# Patient Record
Sex: FEMALE | Race: BLACK OR AFRICAN AMERICAN | Employment: UNEMPLOYED | URBAN - METROPOLITAN AREA
[De-identification: names, ages, dates, MRNs, and addresses within clinical notes are randomized per-mention and may not be internally consistent; named-entity substitution may affect disease eponyms.]

---

## 2017-10-18 ENCOUNTER — TELEPHONE (OUTPATIENT)
Dept: INTERNAL MEDICINE CLINIC | Age: 42
End: 2017-10-18

## 2018-01-26 ENCOUNTER — APPOINTMENT (OUTPATIENT)
Dept: GENERAL RADIOLOGY | Age: 43
DRG: 005 | End: 2018-01-26
Payer: MEDICARE

## 2018-01-26 ENCOUNTER — HOSPITAL ENCOUNTER (INPATIENT)
Age: 43
LOS: 28 days | Discharge: NURSING, REHAB OR LONG-TERM CARE | DRG: 005 | End: 2018-02-23
Attending: EMERGENCY MEDICINE | Admitting: INTERNAL MEDICINE
Payer: MEDICARE

## 2018-01-26 DIAGNOSIS — Z93.1 STATUS POST INSERTION OF PERCUTANEOUS ENDOSCOPIC GASTROSTOMY (PEG) TUBE (HCC): ICD-10-CM

## 2018-01-26 DIAGNOSIS — A41.9 SEPSIS, DUE TO UNSPECIFIED ORGANISM: ICD-10-CM

## 2018-01-26 DIAGNOSIS — I16.1 HYPERTENSIVE EMERGENCY: ICD-10-CM

## 2018-01-26 DIAGNOSIS — Z93.0 STATUS POST TRACHEOSTOMY (HCC): ICD-10-CM

## 2018-01-26 DIAGNOSIS — J96.01 ACUTE RESPIRATORY FAILURE WITH HYPOXIA AND HYPERCAPNIA (HCC): ICD-10-CM

## 2018-01-26 DIAGNOSIS — J44.1 COPD EXACERBATION (HCC): Primary | ICD-10-CM

## 2018-01-26 DIAGNOSIS — J96.02 ACUTE RESPIRATORY FAILURE WITH HYPOXIA AND HYPERCAPNIA (HCC): ICD-10-CM

## 2018-01-26 DIAGNOSIS — J18.9 PNEUMONIA DUE TO ORGANISM: ICD-10-CM

## 2018-01-26 LAB
ALBUMIN SERPL-MCNC: 4 G/DL (ref 3.5–5.2)
ALBUMIN/GLOBULIN RATIO: 1.1 (ref 1–2.5)
ALP BLD-CCNC: 121 U/L (ref 35–104)
ALT SERPL-CCNC: 15 U/L (ref 5–33)
ANION GAP SERPL CALCULATED.3IONS-SCNC: 20 MMOL/L (ref 9–17)
AST SERPL-CCNC: 23 U/L
BILIRUB SERPL-MCNC: 0.59 MG/DL (ref 0.3–1.2)
BILIRUBIN DIRECT: 0.12 MG/DL
BILIRUBIN, INDIRECT: 0.47 MG/DL (ref 0–1)
BNP INTERPRETATION: NORMAL
BUN BLDV-MCNC: 7 MG/DL (ref 6–20)
BUN/CREAT BLD: ABNORMAL (ref 9–20)
CALCIUM SERPL-MCNC: 9.2 MG/DL (ref 8.6–10.4)
CHLORIDE BLD-SCNC: 95 MMOL/L (ref 98–107)
CO2: 21 MMOL/L (ref 20–31)
CREAT SERPL-MCNC: 0.54 MG/DL (ref 0.5–0.9)
GFR AFRICAN AMERICAN: >60 ML/MIN
GFR NON-AFRICAN AMERICAN: >60 ML/MIN
GFR SERPL CREATININE-BSD FRML MDRD: ABNORMAL ML/MIN/{1.73_M2}
GFR SERPL CREATININE-BSD FRML MDRD: ABNORMAL ML/MIN/{1.73_M2}
GLOBULIN: ABNORMAL G/DL (ref 1.5–3.8)
GLUCOSE BLD-MCNC: 224 MG/DL (ref 70–99)
HCT VFR BLD CALC: 38.4 % (ref 36.3–47.1)
HEMOGLOBIN: 11.1 G/DL (ref 11.9–15.1)
INR BLD: 1
LACTIC ACID, WHOLE BLOOD: 2.1 MMOL/L (ref 0.7–2.1)
LACTIC ACID, WHOLE BLOOD: 5.5 MMOL/L (ref 0.7–2.1)
MCH RBC QN AUTO: 25.5 PG (ref 25.2–33.5)
MCHC RBC AUTO-ENTMCNC: 28.9 G/DL (ref 28.4–34.8)
MCV RBC AUTO: 88.3 FL (ref 82.6–102.9)
NRBC AUTOMATED: 0 PER 100 WBC
PARTIAL THROMBOPLASTIN TIME: 21.8 SEC (ref 21.3–31.3)
PDW BLD-RTO: 15.1 % (ref 11.8–14.4)
PLATELET # BLD: 322 K/UL (ref 138–453)
PMV BLD AUTO: 10.6 FL (ref 8.1–13.5)
POC TROPONIN I: 0 NG/ML (ref 0–0.1)
POC TROPONIN I: 0.21 NG/ML (ref 0–0.1)
POC TROPONIN INTERP: ABNORMAL
POC TROPONIN INTERP: NORMAL
POTASSIUM SERPL-SCNC: 3.6 MMOL/L (ref 3.7–5.3)
PRO-BNP: 214 PG/ML
PROTHROMBIN TIME: 11 SEC (ref 9.4–12.6)
RBC # BLD: 4.35 M/UL (ref 3.95–5.11)
SODIUM BLD-SCNC: 136 MMOL/L (ref 135–144)
TOTAL PROTEIN: 7.6 G/DL (ref 6.4–8.3)
WBC # BLD: 14.8 K/UL (ref 3.5–11.3)

## 2018-01-26 PROCEDURE — 99285 EMERGENCY DEPT VISIT HI MDM: CPT

## 2018-01-26 PROCEDURE — 94640 AIRWAY INHALATION TREATMENT: CPT

## 2018-01-26 PROCEDURE — 6360000002 HC RX W HCPCS: Performed by: EMERGENCY MEDICINE

## 2018-01-26 PROCEDURE — 71045 X-RAY EXAM CHEST 1 VIEW: CPT

## 2018-01-26 PROCEDURE — 80048 BASIC METABOLIC PNL TOTAL CA: CPT

## 2018-01-26 PROCEDURE — 85027 COMPLETE CBC AUTOMATED: CPT

## 2018-01-26 PROCEDURE — 85610 PROTHROMBIN TIME: CPT

## 2018-01-26 PROCEDURE — 87040 BLOOD CULTURE FOR BACTERIA: CPT

## 2018-01-26 PROCEDURE — 84484 ASSAY OF TROPONIN QUANT: CPT

## 2018-01-26 PROCEDURE — 81001 URINALYSIS AUTO W/SCOPE: CPT

## 2018-01-26 PROCEDURE — 83880 ASSAY OF NATRIURETIC PEPTIDE: CPT

## 2018-01-26 PROCEDURE — 80076 HEPATIC FUNCTION PANEL: CPT

## 2018-01-26 PROCEDURE — 96365 THER/PROPH/DIAG IV INF INIT: CPT

## 2018-01-26 PROCEDURE — 85730 THROMBOPLASTIN TIME PARTIAL: CPT

## 2018-01-26 PROCEDURE — 2500000003 HC RX 250 WO HCPCS: Performed by: EMERGENCY MEDICINE

## 2018-01-26 PROCEDURE — 83605 ASSAY OF LACTIC ACID: CPT

## 2018-01-26 PROCEDURE — 94664 DEMO&/EVAL PT USE INHALER: CPT

## 2018-01-26 PROCEDURE — 2000000000 HC ICU R&B

## 2018-01-26 PROCEDURE — 51702 INSERT TEMP BLADDER CATH: CPT

## 2018-01-26 PROCEDURE — 93005 ELECTROCARDIOGRAM TRACING: CPT

## 2018-01-26 PROCEDURE — 96375 TX/PRO/DX INJ NEW DRUG ADDON: CPT

## 2018-01-26 PROCEDURE — 94660 CPAP INITIATION&MGMT: CPT

## 2018-01-26 RX ORDER — LEVOFLOXACIN 5 MG/ML
750 INJECTION, SOLUTION INTRAVENOUS ONCE
Status: COMPLETED | OUTPATIENT
Start: 2018-01-26 | End: 2018-01-27

## 2018-01-26 RX ORDER — ALBUTEROL SULFATE 2.5 MG/3ML
5 SOLUTION RESPIRATORY (INHALATION)
Status: DISCONTINUED | OUTPATIENT
Start: 2018-01-26 | End: 2018-01-27

## 2018-01-26 RX ORDER — OXYCODONE AND ACETAMINOPHEN 10; 325 MG/1; MG/1
1 TABLET ORAL EVERY 6 HOURS PRN
Status: ON HOLD | COMMUNITY
End: 2018-02-21 | Stop reason: HOSPADM

## 2018-01-26 RX ORDER — LEVOFLOXACIN 5 MG/ML
500 INJECTION, SOLUTION INTRAVENOUS EVERY 24 HOURS
Status: CANCELLED | OUTPATIENT
Start: 2018-01-26

## 2018-01-26 RX ORDER — LORAZEPAM 2 MG/ML
0.5 INJECTION INTRAMUSCULAR ONCE
Status: COMPLETED | OUTPATIENT
Start: 2018-01-26 | End: 2018-01-26

## 2018-01-26 RX ORDER — ALBUTEROL SULFATE 90 UG/1
2 AEROSOL, METERED RESPIRATORY (INHALATION) EVERY 6 HOURS PRN
COMMUNITY

## 2018-01-26 RX ORDER — LORATADINE 10 MG/1
10 CAPSULE, LIQUID FILLED ORAL DAILY
COMMUNITY
End: 2019-10-28 | Stop reason: SDUPTHER

## 2018-01-26 RX ORDER — AMLODIPINE BESYLATE 10 MG/1
10 TABLET ORAL DAILY
Status: ON HOLD | COMMUNITY
End: 2018-11-21 | Stop reason: HOSPADM

## 2018-01-26 RX ORDER — AZITHROMYCIN 1 G
1 PACKET (EA) ORAL ONCE
Status: ON HOLD | COMMUNITY
End: 2018-02-21 | Stop reason: HOSPADM

## 2018-01-26 RX ORDER — FERROUS SULFATE 325(65) MG
325 TABLET ORAL 2 TIMES DAILY WITH MEALS
COMMUNITY
End: 2019-10-28 | Stop reason: SDUPTHER

## 2018-01-26 RX ORDER — SODIUM CHLORIDE 0.9 % (FLUSH) 0.9 %
10 SYRINGE (ML) INJECTION EVERY 12 HOURS SCHEDULED
Status: CANCELLED | OUTPATIENT
Start: 2018-01-26

## 2018-01-26 RX ORDER — ACETAMINOPHEN 325 MG/1
650 TABLET ORAL EVERY 4 HOURS PRN
Status: CANCELLED | OUTPATIENT
Start: 2018-01-26

## 2018-01-26 RX ORDER — SODIUM CHLORIDE 0.9 % (FLUSH) 0.9 %
10 SYRINGE (ML) INJECTION PRN
Status: CANCELLED | OUTPATIENT
Start: 2018-01-26

## 2018-01-26 RX ORDER — NITROGLYCERIN 20 MG/100ML
5 INJECTION INTRAVENOUS CONTINUOUS
Status: DISCONTINUED | OUTPATIENT
Start: 2018-01-26 | End: 2018-01-27

## 2018-01-26 RX ORDER — PREDNISONE 20 MG/1
40 TABLET ORAL DAILY
Status: CANCELLED | OUTPATIENT
Start: 2018-01-27

## 2018-01-26 RX ORDER — LISINOPRIL 20 MG/1
20 TABLET ORAL DAILY
Status: ON HOLD | COMMUNITY
End: 2018-09-21 | Stop reason: HOSPADM

## 2018-01-26 RX ORDER — FLUTICASONE PROPIONATE 50 MCG
1 SPRAY, SUSPENSION (ML) NASAL DAILY
COMMUNITY

## 2018-01-26 RX ORDER — NICOTINE 21 MG/24HR
1 PATCH, TRANSDERMAL 24 HOURS TRANSDERMAL EVERY 24 HOURS
Status: ON HOLD | COMMUNITY
End: 2018-02-21 | Stop reason: HOSPADM

## 2018-01-26 RX ORDER — FUROSEMIDE 20 MG/1
20 TABLET ORAL DAILY
Status: ON HOLD | COMMUNITY
End: 2018-09-20 | Stop reason: HOSPADM

## 2018-01-26 RX ORDER — HYDROCHLOROTHIAZIDE 12.5 MG/1
12.5 CAPSULE, GELATIN COATED ORAL EVERY MORNING
Status: ON HOLD | COMMUNITY
End: 2018-02-21 | Stop reason: HOSPADM

## 2018-01-26 RX ADMIN — LEVOFLOXACIN 750 MG: 5 INJECTION, SOLUTION INTRAVENOUS at 23:09

## 2018-01-26 RX ADMIN — LORAZEPAM 0.5 MG: 2 INJECTION INTRAMUSCULAR at 21:17

## 2018-01-26 RX ADMIN — IPRATROPIUM BROMIDE 0.5 MG: 0.5 SOLUTION RESPIRATORY (INHALATION) at 22:47

## 2018-01-26 RX ADMIN — ALBUTEROL SULFATE 5 MG: 5 SOLUTION RESPIRATORY (INHALATION) at 22:47

## 2018-01-26 RX ADMIN — NITROGLYCERIN 5 MCG/MIN: 20 INJECTION INTRAVENOUS at 20:34

## 2018-01-26 ASSESSMENT — ENCOUNTER SYMPTOMS
BACK PAIN: 0
NAUSEA: 0
ABDOMINAL PAIN: 0
SHORTNESS OF BREATH: 1
COUGH: 1
VOMITING: 0

## 2018-01-27 ENCOUNTER — APPOINTMENT (OUTPATIENT)
Dept: GENERAL RADIOLOGY | Age: 43
DRG: 005 | End: 2018-01-27
Payer: MEDICARE

## 2018-01-27 ENCOUNTER — APPOINTMENT (OUTPATIENT)
Dept: CT IMAGING | Age: 43
DRG: 005 | End: 2018-01-27
Payer: MEDICARE

## 2018-01-27 PROBLEM — R77.8 ELEVATED TROPONIN: Status: ACTIVE | Noted: 2018-01-27

## 2018-01-27 PROBLEM — J81.1 PULMONARY EDEMA: Status: ACTIVE | Noted: 2018-01-27

## 2018-01-27 PROBLEM — E66.01 MORBID OBESITY (HCC): Status: ACTIVE | Noted: 2018-01-27

## 2018-01-27 PROBLEM — E66.2 OBESITY HYPOVENTILATION SYNDROME (HCC): Status: ACTIVE | Noted: 2018-01-27

## 2018-01-27 PROBLEM — J96.00 ACUTE RESPIRATORY FAILURE (HCC): Status: ACTIVE | Noted: 2018-01-27

## 2018-01-27 PROBLEM — I21.4 NSTEMI (NON-ST ELEVATED MYOCARDIAL INFARCTION) (HCC): Status: ACTIVE | Noted: 2018-01-27

## 2018-01-27 PROBLEM — R79.89 ELEVATED TROPONIN: Status: ACTIVE | Noted: 2018-01-27

## 2018-01-27 PROBLEM — I16.1 HYPERTENSIVE EMERGENCY: Status: ACTIVE | Noted: 2018-01-27

## 2018-01-27 PROBLEM — F17.200 SMOKER: Status: ACTIVE | Noted: 2018-01-27

## 2018-01-27 PROBLEM — R06.02 SOB (SHORTNESS OF BREATH): Status: ACTIVE | Noted: 2018-01-27

## 2018-01-27 LAB
-: ABNORMAL
ALLEN TEST: POSITIVE
AMORPHOUS: ABNORMAL
BACTERIA: ABNORMAL
BILIRUBIN URINE: NEGATIVE
CASTS UA: ABNORMAL /LPF (ref 0–2)
COLOR: YELLOW
COMMENT UA: ABNORMAL
CRYSTALS, UA: ABNORMAL /HPF
DIRECT EXAM: NORMAL
EPITHELIAL CELLS UA: ABNORMAL /HPF (ref 0–5)
FIO2: 70
GLUCOSE URINE: NEGATIVE
KETONES, URINE: ABNORMAL
LEUKOCYTE ESTERASE, URINE: NEGATIVE
LV EF: 65 %
LVEF MODALITY: NORMAL
Lab: NORMAL
MODE: ABNORMAL
MRSA, DNA, NASAL: NORMAL
MUCUS: ABNORMAL
NEGATIVE BASE EXCESS, ART: ABNORMAL (ref 0–2)
NITRITE, URINE: NEGATIVE
O2 DEVICE/FLOW/%: ABNORMAL
OTHER OBSERVATIONS UA: ABNORMAL
PARTIAL THROMBOPLASTIN TIME: 20.3 SEC (ref 21.3–31.3)
PARTIAL THROMBOPLASTIN TIME: 23.6 SEC (ref 21.3–31.3)
PARTIAL THROMBOPLASTIN TIME: 29.4 SEC (ref 21.3–31.3)
PARTIAL THROMBOPLASTIN TIME: 30.1 SEC (ref 21.3–31.3)
PARTIAL THROMBOPLASTIN TIME: 32 SEC (ref 21.3–31.3)
PATIENT TEMP: ABNORMAL
PH UA: 6.5 (ref 5–8)
POC HCO3: 28.3 MMOL/L (ref 21–28)
POC O2 SATURATION: 94 % (ref 94–98)
POC PCO2 TEMP: ABNORMAL MM HG
POC PCO2: 47.2 MM HG (ref 35–48)
POC PH TEMP: ABNORMAL
POC PH: 7.39 (ref 7.35–7.45)
POC PO2 TEMP: ABNORMAL MM HG
POC PO2: 71.7 MM HG (ref 83–108)
POSITIVE BASE EXCESS, ART: 3 (ref 0–3)
PROTEIN UA: ABNORMAL
RBC UA: ABNORMAL /HPF (ref 0–2)
RENAL EPITHELIAL, UA: ABNORMAL /HPF
SAMPLE SITE: ABNORMAL
SPECIFIC GRAVITY UA: 1.01 (ref 1–1.03)
SPECIMEN DESCRIPTION: NORMAL
SPECIMEN DESCRIPTION: NORMAL
STATUS: NORMAL
TCO2 (CALC), ART: 30 MMOL/L (ref 22–29)
TRICHOMONAS: ABNORMAL
TROPONIN INTERP: ABNORMAL
TROPONIN INTERP: ABNORMAL
TROPONIN INTERP: NORMAL
TROPONIN T: 0.05 NG/ML
TROPONIN T: 0.08 NG/ML
TROPONIN T: <0.03 NG/ML
TSH SERPL DL<=0.05 MIU/L-ACNC: 1.23 MIU/L (ref 0.3–5)
TURBIDITY: CLEAR
URINE HGB: NEGATIVE
UROBILINOGEN, URINE: NORMAL
WBC UA: ABNORMAL /HPF (ref 0–5)
YEAST: ABNORMAL

## 2018-01-27 PROCEDURE — 94640 AIRWAY INHALATION TREATMENT: CPT

## 2018-01-27 PROCEDURE — 6360000002 HC RX W HCPCS: Performed by: INTERNAL MEDICINE

## 2018-01-27 PROCEDURE — 6370000000 HC RX 637 (ALT 250 FOR IP): Performed by: HOSPITALIST

## 2018-01-27 PROCEDURE — 6360000002 HC RX W HCPCS: Performed by: EMERGENCY MEDICINE

## 2018-01-27 PROCEDURE — 83036 HEMOGLOBIN GLYCOSYLATED A1C: CPT

## 2018-01-27 PROCEDURE — 2580000003 HC RX 258: Performed by: STUDENT IN AN ORGANIZED HEALTH CARE EDUCATION/TRAINING PROGRAM

## 2018-01-27 PROCEDURE — 82803 BLOOD GASES ANY COMBINATION: CPT

## 2018-01-27 PROCEDURE — 85730 THROMBOPLASTIN TIME PARTIAL: CPT

## 2018-01-27 PROCEDURE — 93306 TTE W/DOPPLER COMPLETE: CPT

## 2018-01-27 PROCEDURE — 6370000000 HC RX 637 (ALT 250 FOR IP): Performed by: EMERGENCY MEDICINE

## 2018-01-27 PROCEDURE — 87801 DETECT AGNT MULT DNA AMPLI: CPT

## 2018-01-27 PROCEDURE — 2500000003 HC RX 250 WO HCPCS: Performed by: EMERGENCY MEDICINE

## 2018-01-27 PROCEDURE — 87205 SMEAR GRAM STAIN: CPT

## 2018-01-27 PROCEDURE — 96366 THER/PROPH/DIAG IV INF ADDON: CPT

## 2018-01-27 PROCEDURE — 94660 CPAP INITIATION&MGMT: CPT

## 2018-01-27 PROCEDURE — 87449 NOS EACH ORGANISM AG IA: CPT

## 2018-01-27 PROCEDURE — 87070 CULTURE OTHR SPECIMN AEROBIC: CPT

## 2018-01-27 PROCEDURE — 87899 AGENT NOS ASSAY W/OPTIC: CPT

## 2018-01-27 PROCEDURE — 6370000000 HC RX 637 (ALT 250 FOR IP): Performed by: STUDENT IN AN ORGANIZED HEALTH CARE EDUCATION/TRAINING PROGRAM

## 2018-01-27 PROCEDURE — 2500000003 HC RX 250 WO HCPCS: Performed by: STUDENT IN AN ORGANIZED HEALTH CARE EDUCATION/TRAINING PROGRAM

## 2018-01-27 PROCEDURE — 6360000002 HC RX W HCPCS

## 2018-01-27 PROCEDURE — 6360000002 HC RX W HCPCS: Performed by: STUDENT IN AN ORGANIZED HEALTH CARE EDUCATION/TRAINING PROGRAM

## 2018-01-27 PROCEDURE — 93005 ELECTROCARDIOGRAM TRACING: CPT

## 2018-01-27 PROCEDURE — 87641 MR-STAPH DNA AMP PROBE: CPT

## 2018-01-27 PROCEDURE — 96376 TX/PRO/DX INJ SAME DRUG ADON: CPT

## 2018-01-27 PROCEDURE — 84484 ASSAY OF TROPONIN QUANT: CPT

## 2018-01-27 PROCEDURE — 36600 WITHDRAWAL OF ARTERIAL BLOOD: CPT

## 2018-01-27 PROCEDURE — 84443 ASSAY THYROID STIM HORMONE: CPT

## 2018-01-27 PROCEDURE — 87804 INFLUENZA ASSAY W/OPTIC: CPT

## 2018-01-27 PROCEDURE — 80307 DRUG TEST PRSMV CHEM ANLYZR: CPT

## 2018-01-27 PROCEDURE — 71045 X-RAY EXAM CHEST 1 VIEW: CPT

## 2018-01-27 PROCEDURE — 87086 URINE CULTURE/COLONY COUNT: CPT

## 2018-01-27 PROCEDURE — 36415 COLL VENOUS BLD VENIPUNCTURE: CPT

## 2018-01-27 PROCEDURE — 96375 TX/PRO/DX INJ NEW DRUG ADDON: CPT

## 2018-01-27 PROCEDURE — 2000000000 HC ICU R&B

## 2018-01-27 PROCEDURE — 6360000002 HC RX W HCPCS: Performed by: HOSPITALIST

## 2018-01-27 PROCEDURE — 83735 ASSAY OF MAGNESIUM: CPT

## 2018-01-27 PROCEDURE — 99291 CRITICAL CARE FIRST HOUR: CPT | Performed by: INTERNAL MEDICINE

## 2018-01-27 RX ORDER — AMLODIPINE BESYLATE 10 MG/1
10 TABLET ORAL DAILY
Status: DISCONTINUED | OUTPATIENT
Start: 2018-01-27 | End: 2018-01-27

## 2018-01-27 RX ORDER — HEPARIN SODIUM 10000 [USP'U]/100ML
1000 INJECTION, SOLUTION INTRAVENOUS CONTINUOUS
Status: DISCONTINUED | OUTPATIENT
Start: 2018-01-27 | End: 2018-01-28

## 2018-01-27 RX ORDER — HEPARIN SODIUM 10000 [USP'U]/100ML
12 INJECTION, SOLUTION INTRAVENOUS CONTINUOUS
Status: DISCONTINUED | OUTPATIENT
Start: 2018-01-27 | End: 2018-01-27

## 2018-01-27 RX ORDER — FUROSEMIDE 10 MG/ML
20 INJECTION INTRAMUSCULAR; INTRAVENOUS ONCE
Status: COMPLETED | OUTPATIENT
Start: 2018-01-27 | End: 2018-01-27

## 2018-01-27 RX ORDER — METOPROLOL TARTRATE 50 MG/1
25 TABLET, FILM COATED ORAL 2 TIMES DAILY
Status: DISCONTINUED | OUTPATIENT
Start: 2018-01-27 | End: 2018-01-27

## 2018-01-27 RX ORDER — LABETALOL HYDROCHLORIDE 5 MG/ML
10 INJECTION, SOLUTION INTRAVENOUS ONCE
Status: DISCONTINUED | OUTPATIENT
Start: 2018-01-27 | End: 2018-01-27

## 2018-01-27 RX ORDER — LISINOPRIL 10 MG/1
10 TABLET ORAL DAILY
Status: DISCONTINUED | OUTPATIENT
Start: 2018-01-28 | End: 2018-01-28

## 2018-01-27 RX ORDER — FENTANYL CITRATE 50 UG/ML
INJECTION, SOLUTION INTRAMUSCULAR; INTRAVENOUS
Status: COMPLETED
Start: 2018-01-27 | End: 2018-01-27

## 2018-01-27 RX ORDER — AMLODIPINE BESYLATE 10 MG/1
10 TABLET ORAL DAILY
Status: DISCONTINUED | OUTPATIENT
Start: 2018-01-28 | End: 2018-01-29

## 2018-01-27 RX ORDER — ATORVASTATIN CALCIUM 40 MG/1
40 TABLET, FILM COATED ORAL NIGHTLY
Status: DISCONTINUED | OUTPATIENT
Start: 2018-01-27 | End: 2018-02-21

## 2018-01-27 RX ORDER — FENTANYL CITRATE 50 UG/ML
100 INJECTION, SOLUTION INTRAMUSCULAR; INTRAVENOUS ONCE
Status: COMPLETED | OUTPATIENT
Start: 2018-01-27 | End: 2018-01-27

## 2018-01-27 RX ORDER — LABETALOL HYDROCHLORIDE 5 MG/ML
20 INJECTION, SOLUTION INTRAVENOUS ONCE
Status: COMPLETED | OUTPATIENT
Start: 2018-01-27 | End: 2018-01-27

## 2018-01-27 RX ORDER — DOXYCYCLINE HYCLATE 100 MG
100 TABLET ORAL 2 TIMES DAILY
Status: ON HOLD | COMMUNITY
End: 2018-09-20 | Stop reason: HOSPADM

## 2018-01-27 RX ORDER — POTASSIUM CHLORIDE 20 MEQ/1
40 TABLET, EXTENDED RELEASE ORAL PRN
Status: DISCONTINUED | OUTPATIENT
Start: 2018-01-27 | End: 2018-02-23 | Stop reason: HOSPADM

## 2018-01-27 RX ORDER — METHYLPREDNISOLONE SODIUM SUCCINATE 125 MG/2ML
40 INJECTION, POWDER, LYOPHILIZED, FOR SOLUTION INTRAMUSCULAR; INTRAVENOUS EVERY 8 HOURS
Status: DISCONTINUED | OUTPATIENT
Start: 2018-01-27 | End: 2018-01-27

## 2018-01-27 RX ORDER — LORAZEPAM 2 MG/ML
1 INJECTION INTRAMUSCULAR ONCE
Status: COMPLETED | OUTPATIENT
Start: 2018-01-27 | End: 2018-01-27

## 2018-01-27 RX ORDER — FENTANYL CITRATE 50 UG/ML
50 INJECTION, SOLUTION INTRAMUSCULAR; INTRAVENOUS
Status: DISCONTINUED | OUTPATIENT
Start: 2018-01-27 | End: 2018-01-28

## 2018-01-27 RX ORDER — IPRATROPIUM BROMIDE AND ALBUTEROL SULFATE 2.5; .5 MG/3ML; MG/3ML
1 SOLUTION RESPIRATORY (INHALATION) 4 TIMES DAILY
Status: DISCONTINUED | OUTPATIENT
Start: 2018-01-27 | End: 2018-02-03

## 2018-01-27 RX ORDER — FENTANYL CITRATE 50 UG/ML
50 INJECTION, SOLUTION INTRAMUSCULAR; INTRAVENOUS ONCE
Status: COMPLETED | OUTPATIENT
Start: 2018-01-27 | End: 2018-01-27

## 2018-01-27 RX ORDER — SODIUM CHLORIDE 0.9 % (FLUSH) 0.9 %
10 SYRINGE (ML) INJECTION EVERY 12 HOURS SCHEDULED
Status: DISCONTINUED | OUTPATIENT
Start: 2018-01-27 | End: 2018-02-18

## 2018-01-27 RX ORDER — SODIUM CHLORIDE 0.9 % (FLUSH) 0.9 %
10 SYRINGE (ML) INJECTION PRN
Status: DISCONTINUED | OUTPATIENT
Start: 2018-01-27 | End: 2018-02-23 | Stop reason: HOSPADM

## 2018-01-27 RX ORDER — HYDROCHLOROTHIAZIDE 12.5 MG/1
12.5 CAPSULE, GELATIN COATED ORAL EVERY MORNING
Status: DISCONTINUED | OUTPATIENT
Start: 2018-01-28 | End: 2018-01-29

## 2018-01-27 RX ORDER — LEVOFLOXACIN 5 MG/ML
750 INJECTION, SOLUTION INTRAVENOUS EVERY 24 HOURS
Status: DISCONTINUED | OUTPATIENT
Start: 2018-01-27 | End: 2018-01-27

## 2018-01-27 RX ORDER — LABETALOL HYDROCHLORIDE 5 MG/ML
10 INJECTION, SOLUTION INTRAVENOUS ONCE
Status: COMPLETED | OUTPATIENT
Start: 2018-01-27 | End: 2018-01-27

## 2018-01-27 RX ORDER — NICOTINE 21 MG/24HR
1 PATCH, TRANSDERMAL 24 HOURS TRANSDERMAL DAILY
Status: DISCONTINUED | OUTPATIENT
Start: 2018-01-27 | End: 2018-02-01

## 2018-01-27 RX ORDER — ASPIRIN 81 MG/1
81 TABLET, CHEWABLE ORAL DAILY
Status: DISCONTINUED | OUTPATIENT
Start: 2018-01-27 | End: 2018-01-27

## 2018-01-27 RX ORDER — POTASSIUM CHLORIDE 20MEQ/15ML
40 LIQUID (ML) ORAL PRN
Status: DISCONTINUED | OUTPATIENT
Start: 2018-01-27 | End: 2018-02-23 | Stop reason: HOSPADM

## 2018-01-27 RX ORDER — IPRATROPIUM BROMIDE AND ALBUTEROL SULFATE 2.5; .5 MG/3ML; MG/3ML
1 SOLUTION RESPIRATORY (INHALATION)
Status: DISCONTINUED | OUTPATIENT
Start: 2018-01-27 | End: 2018-01-27

## 2018-01-27 RX ORDER — FUROSEMIDE 10 MG/ML
40 INJECTION INTRAMUSCULAR; INTRAVENOUS 2 TIMES DAILY
Status: DISCONTINUED | OUTPATIENT
Start: 2018-01-27 | End: 2018-01-27

## 2018-01-27 RX ORDER — METHYLPREDNISOLONE SODIUM SUCCINATE 125 MG/2ML
40 INJECTION, POWDER, LYOPHILIZED, FOR SOLUTION INTRAMUSCULAR; INTRAVENOUS EVERY 6 HOURS
Status: DISCONTINUED | OUTPATIENT
Start: 2018-01-27 | End: 2018-01-29

## 2018-01-27 RX ORDER — PREDNISONE 20 MG/1
20 TABLET ORAL DAILY
Status: ON HOLD | COMMUNITY
End: 2018-02-21 | Stop reason: HOSPADM

## 2018-01-27 RX ORDER — ONDANSETRON 2 MG/ML
4 INJECTION INTRAMUSCULAR; INTRAVENOUS EVERY 6 HOURS PRN
Status: DISCONTINUED | OUTPATIENT
Start: 2018-01-27 | End: 2018-02-23 | Stop reason: HOSPADM

## 2018-01-27 RX ORDER — ACETAMINOPHEN 325 MG/1
650 TABLET ORAL EVERY 4 HOURS PRN
Status: DISCONTINUED | OUTPATIENT
Start: 2018-01-27 | End: 2018-01-27

## 2018-01-27 RX ORDER — HEPARIN SODIUM 1000 [USP'U]/ML
4000 INJECTION, SOLUTION INTRAVENOUS; SUBCUTANEOUS PRN
Status: DISCONTINUED | OUTPATIENT
Start: 2018-01-27 | End: 2018-01-27

## 2018-01-27 RX ORDER — METOPROLOL TARTRATE 50 MG/1
25 TABLET, FILM COATED ORAL ONCE
Status: COMPLETED | OUTPATIENT
Start: 2018-01-27 | End: 2018-01-27

## 2018-01-27 RX ORDER — HEPARIN SODIUM 1000 [USP'U]/ML
4000 INJECTION, SOLUTION INTRAVENOUS; SUBCUTANEOUS PRN
Status: DISCONTINUED | OUTPATIENT
Start: 2018-01-27 | End: 2018-01-29

## 2018-01-27 RX ORDER — OSELTAMIVIR PHOSPHATE 75 MG/1
75 CAPSULE ORAL 2 TIMES DAILY
Status: DISCONTINUED | OUTPATIENT
Start: 2018-01-27 | End: 2018-01-30

## 2018-01-27 RX ORDER — NICOTINE 21 MG/24HR
1 PATCH, TRANSDERMAL 24 HOURS TRANSDERMAL EVERY 24 HOURS
Status: ON HOLD | COMMUNITY
End: 2018-09-20 | Stop reason: HOSPADM

## 2018-01-27 RX ORDER — HEPARIN SODIUM 10000 [USP'U]/100ML
1000 INJECTION, SOLUTION INTRAVENOUS CONTINUOUS
Status: DISCONTINUED | OUTPATIENT
Start: 2018-01-27 | End: 2018-01-27

## 2018-01-27 RX ORDER — LISINOPRIL 10 MG/1
10 TABLET ORAL DAILY
Status: DISCONTINUED | OUTPATIENT
Start: 2018-01-27 | End: 2018-01-27

## 2018-01-27 RX ORDER — SODIUM CHLORIDE 0.9 % (FLUSH) 0.9 %
10 SYRINGE (ML) INJECTION EVERY 12 HOURS SCHEDULED
Status: DISCONTINUED | OUTPATIENT
Start: 2018-01-27 | End: 2018-01-27

## 2018-01-27 RX ORDER — HEPARIN SODIUM 1000 [USP'U]/ML
2000 INJECTION, SOLUTION INTRAVENOUS; SUBCUTANEOUS PRN
Status: DISCONTINUED | OUTPATIENT
Start: 2018-01-27 | End: 2018-01-27

## 2018-01-27 RX ORDER — ATORVASTATIN CALCIUM 80 MG/1
40 TABLET, FILM COATED ORAL NIGHTLY
Status: DISCONTINUED | OUTPATIENT
Start: 2018-01-27 | End: 2018-01-27

## 2018-01-27 RX ORDER — LISINOPRIL 10 MG/1
20 TABLET ORAL DAILY
Status: DISCONTINUED | OUTPATIENT
Start: 2018-01-27 | End: 2018-01-27

## 2018-01-27 RX ORDER — ALBUTEROL SULFATE 2.5 MG/3ML
2.5 SOLUTION RESPIRATORY (INHALATION) 2 TIMES DAILY
Status: DISCONTINUED | OUTPATIENT
Start: 2018-01-28 | End: 2018-02-03

## 2018-01-27 RX ORDER — HEPARIN SODIUM 1000 [USP'U]/ML
30 INJECTION, SOLUTION INTRAVENOUS; SUBCUTANEOUS PRN
Status: DISCONTINUED | OUTPATIENT
Start: 2018-01-27 | End: 2018-01-27

## 2018-01-27 RX ORDER — LABETALOL HYDROCHLORIDE 5 MG/ML
10 INJECTION, SOLUTION INTRAVENOUS EVERY 4 HOURS PRN
Status: DISCONTINUED | OUTPATIENT
Start: 2018-01-27 | End: 2018-01-27

## 2018-01-27 RX ORDER — HEPARIN SODIUM 1000 [USP'U]/ML
2000 INJECTION, SOLUTION INTRAVENOUS; SUBCUTANEOUS PRN
Status: DISCONTINUED | OUTPATIENT
Start: 2018-01-27 | End: 2018-01-29

## 2018-01-27 RX ORDER — HEPARIN SODIUM 1000 [USP'U]/ML
60 INJECTION, SOLUTION INTRAVENOUS; SUBCUTANEOUS PRN
Status: DISCONTINUED | OUTPATIENT
Start: 2018-01-27 | End: 2018-01-27

## 2018-01-27 RX ORDER — ALBUTEROL SULFATE 2.5 MG/3ML
2.5 SOLUTION RESPIRATORY (INHALATION)
Status: DISCONTINUED | OUTPATIENT
Start: 2018-01-27 | End: 2018-01-29

## 2018-01-27 RX ORDER — ATORVASTATIN CALCIUM 10 MG/1
10 TABLET, FILM COATED ORAL ONCE
Status: COMPLETED | OUTPATIENT
Start: 2018-01-27 | End: 2018-01-27

## 2018-01-27 RX ORDER — SODIUM CHLORIDE 0.9 % (FLUSH) 0.9 %
10 SYRINGE (ML) INJECTION PRN
Status: DISCONTINUED | OUTPATIENT
Start: 2018-01-27 | End: 2018-01-27

## 2018-01-27 RX ORDER — HEPARIN SODIUM 1000 [USP'U]/ML
4000 INJECTION, SOLUTION INTRAVENOUS; SUBCUTANEOUS ONCE
Status: COMPLETED | OUTPATIENT
Start: 2018-01-27 | End: 2018-01-27

## 2018-01-27 RX ORDER — AZITHROMYCIN 250 MG/1
250 TABLET, FILM COATED ORAL DAILY
Status: ON HOLD | COMMUNITY
End: 2018-02-21 | Stop reason: HOSPADM

## 2018-01-27 RX ORDER — METHYLPREDNISOLONE SODIUM SUCCINATE 125 MG/2ML
125 INJECTION, POWDER, LYOPHILIZED, FOR SOLUTION INTRAMUSCULAR; INTRAVENOUS ONCE
Status: COMPLETED | OUTPATIENT
Start: 2018-01-27 | End: 2018-01-27

## 2018-01-27 RX ORDER — POTASSIUM CHLORIDE 7.45 MG/ML
10 INJECTION INTRAVENOUS PRN
Status: DISCONTINUED | OUTPATIENT
Start: 2018-01-27 | End: 2018-02-23 | Stop reason: HOSPADM

## 2018-01-27 RX ORDER — ACETAMINOPHEN 325 MG/1
650 TABLET ORAL EVERY 4 HOURS PRN
Status: DISCONTINUED | OUTPATIENT
Start: 2018-01-27 | End: 2018-02-23 | Stop reason: HOSPADM

## 2018-01-27 RX ORDER — CLOPIDOGREL 300 MG/1
300 TABLET, FILM COATED ORAL ONCE
Status: COMPLETED | OUTPATIENT
Start: 2018-01-27 | End: 2018-01-27

## 2018-01-27 RX ORDER — ALBUTEROL SULFATE 2.5 MG/3ML
2.5 SOLUTION RESPIRATORY (INHALATION)
Status: DISCONTINUED | OUTPATIENT
Start: 2018-01-27 | End: 2018-01-27

## 2018-01-27 RX ORDER — ONDANSETRON 2 MG/ML
4 INJECTION INTRAMUSCULAR; INTRAVENOUS EVERY 6 HOURS PRN
Status: DISCONTINUED | OUTPATIENT
Start: 2018-01-27 | End: 2018-01-27

## 2018-01-27 RX ADMIN — NICARDIPINE HYDROCHLORIDE 10 MG/HR: 0.1 INJECTION, SOLUTION INTRAVENOUS at 20:33

## 2018-01-27 RX ADMIN — IPRATROPIUM BROMIDE AND ALBUTEROL SULFATE 1 AMPULE: .5; 3 SOLUTION RESPIRATORY (INHALATION) at 19:31

## 2018-01-27 RX ADMIN — LORAZEPAM 1 MG: 2 INJECTION INTRAMUSCULAR; INTRAVENOUS at 12:03

## 2018-01-27 RX ADMIN — IPRATROPIUM BROMIDE AND ALBUTEROL SULFATE 1 AMPULE: .5; 3 SOLUTION RESPIRATORY (INHALATION) at 16:18

## 2018-01-27 RX ADMIN — METHYLPREDNISOLONE SODIUM SUCCINATE 40 MG: 125 INJECTION, POWDER, FOR SOLUTION INTRAMUSCULAR; INTRAVENOUS at 16:15

## 2018-01-27 RX ADMIN — LORAZEPAM 1 MG: 2 INJECTION INTRAMUSCULAR; INTRAVENOUS at 04:02

## 2018-01-27 RX ADMIN — METOPROLOL TARTRATE 25 MG: 50 TABLET, FILM COATED ORAL at 02:04

## 2018-01-27 RX ADMIN — FENTANYL CITRATE 100 MCG: 50 INJECTION, SOLUTION INTRAMUSCULAR; INTRAVENOUS at 15:57

## 2018-01-27 RX ADMIN — METHYLPREDNISOLONE SODIUM SUCCINATE 40 MG: 125 INJECTION, POWDER, FOR SOLUTION INTRAMUSCULAR; INTRAVENOUS at 20:33

## 2018-01-27 RX ADMIN — CLOPIDOGREL BISULFATE 300 MG: 300 TABLET, FILM COATED ORAL at 01:09

## 2018-01-27 RX ADMIN — WATER 1 G: 1 INJECTION INTRAMUSCULAR; INTRAVENOUS; SUBCUTANEOUS at 17:33

## 2018-01-27 RX ADMIN — HEPARIN SODIUM AND DEXTROSE 1000 UNITS/HR: 10000; 5 INJECTION INTRAVENOUS at 01:12

## 2018-01-27 RX ADMIN — FUROSEMIDE 20 MG: 10 INJECTION, SOLUTION INTRAVENOUS at 09:06

## 2018-01-27 RX ADMIN — METHYLPREDNISOLONE SODIUM SUCCINATE 125 MG: 125 INJECTION, POWDER, FOR SOLUTION INTRAMUSCULAR; INTRAVENOUS at 04:02

## 2018-01-27 RX ADMIN — LORAZEPAM 1 MG: 2 INJECTION INTRAMUSCULAR; INTRAVENOUS at 08:16

## 2018-01-27 RX ADMIN — NITROGLYCERIN 120 MCG/MIN: 20 INJECTION INTRAVENOUS at 05:27

## 2018-01-27 RX ADMIN — ALBUTEROL SULFATE 5 MG: 5 SOLUTION RESPIRATORY (INHALATION) at 07:17

## 2018-01-27 RX ADMIN — LABETALOL HYDROCHLORIDE 20 MG: 5 INJECTION, SOLUTION INTRAVENOUS at 06:37

## 2018-01-27 RX ADMIN — HEPARIN SODIUM AND DEXTROSE 10.48 UNITS/KG/HR: 10000; 5 INJECTION INTRAVENOUS at 23:24

## 2018-01-27 RX ADMIN — NITROGLYCERIN 150 MCG/MIN: 20 INJECTION INTRAVENOUS at 06:05

## 2018-01-27 RX ADMIN — LIDOCAINE HYDROCHLORIDE: 20 JELLY TOPICAL at 09:06

## 2018-01-27 RX ADMIN — AZITHROMYCIN MONOHYDRATE 500 MG: 500 INJECTION, POWDER, LYOPHILIZED, FOR SOLUTION INTRAVENOUS at 16:21

## 2018-01-27 RX ADMIN — NITROGLYCERIN 140 MCG/MIN: 20 INJECTION INTRAVENOUS at 05:47

## 2018-01-27 RX ADMIN — FENTANYL CITRATE 50 MCG: 50 INJECTION INTRAMUSCULAR; INTRAVENOUS at 21:54

## 2018-01-27 RX ADMIN — HEPARIN SODIUM 4000 UNITS: 1000 INJECTION, SOLUTION INTRAVENOUS; SUBCUTANEOUS at 18:13

## 2018-01-27 RX ADMIN — NICARDIPINE HYDROCHLORIDE 10 MG/HR: 0.1 INJECTION, SOLUTION INTRAVENOUS at 22:42

## 2018-01-27 RX ADMIN — FUROSEMIDE 20 MG: 10 INJECTION, SOLUTION INTRAMUSCULAR; INTRAVENOUS at 16:11

## 2018-01-27 RX ADMIN — LABETALOL HYDROCHLORIDE 10 MG: 5 INJECTION, SOLUTION INTRAVENOUS at 05:50

## 2018-01-27 RX ADMIN — FENTANYL CITRATE 50 MCG: 50 INJECTION INTRAMUSCULAR; INTRAVENOUS at 18:14

## 2018-01-27 RX ADMIN — NITROGLYCERIN 130 MCG/MIN: 20 INJECTION INTRAVENOUS at 05:33

## 2018-01-27 RX ADMIN — FENTANYL CITRATE 50 MCG: 50 INJECTION INTRAMUSCULAR; INTRAVENOUS at 23:22

## 2018-01-27 RX ADMIN — LORAZEPAM 1 MG: 2 INJECTION INTRAMUSCULAR; INTRAVENOUS at 05:31

## 2018-01-27 RX ADMIN — ALBUTEROL SULFATE 5 MG: 5 SOLUTION RESPIRATORY (INHALATION) at 04:26

## 2018-01-27 RX ADMIN — NICARDIPINE HYDROCHLORIDE 5 MG/HR: 0.1 INJECTION, SOLUTION INTRAVENOUS at 15:11

## 2018-01-27 RX ADMIN — ATORVASTATIN CALCIUM 10 MG: 10 TABLET, FILM COATED ORAL at 01:09

## 2018-01-27 RX ADMIN — HEPARIN SODIUM 2000 UNITS: 1000 INJECTION, SOLUTION INTRAVENOUS; SUBCUTANEOUS at 04:10

## 2018-01-27 RX ADMIN — FENTANYL CITRATE 50 MCG: 50 INJECTION, SOLUTION INTRAMUSCULAR; INTRAVENOUS at 01:07

## 2018-01-27 RX ADMIN — DESMOPRESSIN ACETATE 40 MG: 0.2 TABLET ORAL at 20:30

## 2018-01-27 RX ADMIN — NICARDIPINE HYDROCHLORIDE 7.5 MG/HR: 0.1 INJECTION, SOLUTION INTRAVENOUS at 17:43

## 2018-01-27 RX ADMIN — ALBUTEROL SULFATE 5 MG: 5 SOLUTION RESPIRATORY (INHALATION) at 02:27

## 2018-01-27 RX ADMIN — ALBUTEROL SULFATE 2.5 MG: 2.5 SOLUTION RESPIRATORY (INHALATION) at 23:35

## 2018-01-27 RX ADMIN — OSELTAMIVIR PHOSPHATE 75 MG: 75 CAPSULE ORAL at 20:30

## 2018-01-27 RX ADMIN — HEPARIN SODIUM 2000 UNITS: 1000 INJECTION, SOLUTION INTRAVENOUS; SUBCUTANEOUS at 10:20

## 2018-01-27 RX ADMIN — NITROGLYCERIN 160 MCG/MIN: 20 INJECTION INTRAVENOUS at 06:23

## 2018-01-27 RX ADMIN — Medication 10 ML: at 20:33

## 2018-01-27 RX ADMIN — HEPARIN SODIUM 4000 UNITS: 1000 INJECTION, SOLUTION INTRAVENOUS; SUBCUTANEOUS at 01:11

## 2018-01-27 ASSESSMENT — PULMONARY FUNCTION TESTS
PIF_VALUE: 34
PIF_VALUE: 32
PIF_VALUE: 35
PIF_VALUE: 31
PIF_VALUE: 31
PIF_VALUE: 32
PIF_VALUE: 34
PIF_VALUE: 30
PIF_VALUE: 32
PIF_VALUE: 29
PIF_VALUE: 35
PIF_VALUE: 28
PIF_VALUE: 32
PIF_VALUE: 31
PIF_VALUE: 33
PIF_VALUE: 34
PIF_VALUE: 32

## 2018-01-27 ASSESSMENT — PAIN SCALES - GENERAL
PAINLEVEL_OUTOF10: 6
PAINLEVEL_OUTOF10: 2
PAINLEVEL_OUTOF10: 8
PAINLEVEL_OUTOF10: 8
PAINLEVEL_OUTOF10: 9

## 2018-01-27 ASSESSMENT — PAIN DESCRIPTION - LOCATION
LOCATION: CHEST
LOCATION: CHEST

## 2018-01-27 ASSESSMENT — PAIN DESCRIPTION - PAIN TYPE
TYPE: ACUTE PAIN
TYPE: ACUTE PAIN

## 2018-01-27 NOTE — PROGRESS NOTES
Pt wanted a break from BiPAP. Placed on aerosol mask at 100%. Pt did not tolerate (off BiPAP <1min) and requested to have BiPAP placed back on.

## 2018-01-27 NOTE — PROGRESS NOTES
[facility]  [facility]  Emergency Department  Emergency Medicine Resident Sign-out     Care of Roxanne Pereira was assumed from Dr. Jesús Peralta and is being seen for Shortness of Breath and Respiratory Distress  . The patient's initial evaluation and plan have been discussed with the prior provider who initially evaluated the patient.      EMERGENCY DEPARTMENT COURSE / MEDICAL DECISION MAKING:       MEDICATIONS GIVEN:  Orders Placed This Encounter   Medications    nitroGLYCERIN 50 mg in dextrose 5% 250 mL infusion    LORazepam (ATIVAN) injection 0.5 mg    levofloxacin (LEVAQUIN) 750 MG/150ML infusion 750 mg    albuterol (PROVENTIL) nebulizer solution 5 mg    albuterol (PROVENTIL) nebulizer solution 5 mg    ipratropium (ATROVENT) 0.02 % nebulizer solution 0.5 mg    metoprolol tartrate (LOPRESSOR) tablet 25 mg    heparin (porcine) injection 4,000 Units    heparin (porcine) injection 4,000 Units    heparin (porcine) injection 2,000 Units    heparin 25,000 units in dextrose 5% 250 mL infusion    clopidogrel (PLAVIX) tablet 300 mg    atorvastatin (LIPITOR) tablet 10 mg    fentaNYL (SUBLIMAZE) injection 50 mcg    methylPREDNISolone sodium (SOLU-MEDROL) injection 125 mg    LORazepam (ATIVAN) injection 1 mg    LORazepam (ATIVAN) injection 1 mg    labetalol (NORMODYNE;TRANDATE) injection 10 mg    labetalol (NORMODYNE;TRANDATE) injection 20 mg       LABS / RADIOLOGY:     Labs Reviewed   CBC - Abnormal; Notable for the following:        Result Value    WBC 14.8 (*)     Hemoglobin 11.1 (*)     RDW 15.1 (*)     All other components within normal limits   BASIC METABOLIC PANEL - Abnormal; Notable for the following:     Glucose 224 (*)     Potassium 3.6 (*)     Chloride 95 (*)     Anion Gap 20 (*)     All other components within normal limits   LACTIC ACID, WHOLE BLOOD - Abnormal; Notable for the following:     Lactic Acid, Whole Blood 5.5 (*)     All other components within normal limits   HEPATIC FUNCTION PANEL - Abnormal; Notable for the following:     Alkaline Phosphatase 121 (*)     All other components within normal limits   TROPONIN - Abnormal; Notable for the following:     Troponin T 0.08 (*)     All other components within normal limits   APTT - Abnormal; Notable for the following:     PTT 20.3 (*)     All other components within normal limits   APTT - Abnormal; Notable for the following:     PTT 32.0 (*)     All other components within normal limits   POCT TROPONIN - Abnormal; Notable for the following:     POC Troponin I 0.21 (*)     All other components within normal limits   ARTERIAL BLOOD GAS, POC - Abnormal; Notable for the following:     POC PO2 71.7 (*)     POC HCO3 28.3 (*)     TCO2 (calc), Art 30 (*)     All other components within normal limits   RAPID INFLUENZA A/B ANTIGENS   CULTURE BLOOD #1   CULTURE BLOOD #1   URINE CULTURE   CULTURE BLOOD #1   CULTURE BLOOD #2   BRAIN NATRIURETIC PEPTIDE   LACTIC ACID, WHOLE BLOOD   PROTIME-INR   APTT   APTT   URINALYSIS   APTT   LACTIC ACID, WHOLE BLOOD   LACTIC ACID, WHOLE BLOOD   HEMOGLOBIN A1C   TROPONIN   TROPONIN   TROPONIN   TSH WITH REFLEX   POCT TROPONIN   POCT TROPONIN   POCT TROPONIN   POC G3: BLOOD GASES INCLUDES CALC. TCO2, HCO3, BASE EXCESS, SO2       Xr Chest Portable    Result Date: 1/27/2018  EXAMINATION: SINGLE VIEW OF THE CHEST 1/27/2018 12:59 am COMPARISON: 01/26/2018 at 2034 hours HISTORY: ORDERING SYSTEM PROVIDED HISTORY: worsening SOB TECHNOLOGIST PROVIDED HISTORY: Reason for exam:->worsening SOB FINDINGS: Portable study is limited by obesity. There is increasing bilateral multifocal airspace disease. Airspace disease is asymmetric and primarily peripheral in distribution. Heart size is upper limits of normal.  No pneumothorax or pleural fluid. No acute bone finding. Increasing bilateral airspace disease most consistent with worsening multifocal pneumonia. Asymmetric pulmonary edema not excluded.      Xr Chest Portable    Result

## 2018-01-27 NOTE — ED NOTES
Report given to [address] [place] [address]'[facility] on [address] [facility].       Saeed Santana RN  01/27/18 3643

## 2018-01-27 NOTE — ED NOTES
Temporary report received from MARTINE Worrell. Pt resting on cart, requesting to go to her room. RR even and NL, NAD noted.  Will continue to monitor     Fay Perry RN  01/27/18 9028

## 2018-01-27 NOTE — ED NOTES
Rn at bedside, pt medicated at this time  Pt was given PO fluids w/ pills  Pt started on Heparin at this time       Morales Lindsey, MARTINE  01/27/18 6649

## 2018-01-27 NOTE — PROGRESS NOTES
01/27/18 1413   Vent Information   Pressure Ordered 12   FiO2  60 %   PEEP/CPAP 12   Alarm Settings   High Respiratory Rate 60 br/min   Pt settings changed per Dr. Solitario Cadena orders.

## 2018-01-27 NOTE — PROGRESS NOTES
01/27/18 1817 01/27/18 1821 01/27/18 1822   Vent Information   Pressure Ordered 10 --  --    FiO2  --  80 % --    PEEP/CPAP 18 --  20 01/27/18 1824   Vent Information   Pressure Ordered --    FiO2  75 %   PEEP/CPAP --      Adjusted settings to help with pt feeling like she isn't getting enough air.

## 2018-01-27 NOTE — H&P
Home Meds:   Prior to Admission medications    Medication Sig Start Date End Date Taking? Authorizing Provider   furosemide (LASIX) 20 MG tablet Take 20 mg by mouth daily    Historical Provider, MD   tiotropium (SPIRIVA) 18 MCG inhalation capsule Inhale 18 mcg into the lungs daily    Historical Provider, MD   albuterol sulfate  (90 Base) MCG/ACT inhaler Inhale 2 puffs into the lungs every 6 hours as needed for Wheezing    Historical Provider, MD   oxyCODONE-acetaminophen (PERCOCET)  MG per tablet Take 1 tablet by mouth every 6 hours as needed for Pain. Historical Provider, MD   ferrous sulfate 325 (65 Fe) MG tablet Take 325 mg by mouth daily (with breakfast)    Historical Provider, MD   amLODIPine (NORVASC) 10 MG tablet Take 10 mg by mouth daily    Historical Provider, MD   azithromycin (ZITHROMAX) 1 g powder Take 1 packet by mouth once    Historical Provider, MD   hydrochlorothiazide (MICROZIDE) 12.5 MG capsule Take 12.5 mg by mouth every morning    Historical Provider, MD   lisinopril (PRINIVIL;ZESTRIL) 20 MG tablet Take 20 mg by mouth daily    Historical Provider, MD   loratadine (CLARITIN) 10 MG capsule Take 10 mg by mouth daily    Historical Provider, MD   Fluticasone-Salmeterol (ADVAIR DISKUS IN) Inhale into the lungs    Historical Provider, MD   fluticasone (FLONASE) 50 MCG/ACT nasal spray 1 spray by Nasal route daily    Historical Provider, MD   nicotine (NICOTINE STEP 2) 14 MG/24HR Place 1 patch onto the skin every 24 hours    Historical Provider, MD       Social History:   TOBACCO:   reports that she has been smoking. She has been smoking about 0.50 packs per day. She has never used smokeless tobacco.  ETOH:   has no alcohol history on file. DRUGS:  reports that she does not use drugs. OCCUPATION:      Family History:   History reviewed. No pertinent family history.       REVIEW OF SYSTEMS (ROS):  Review of Systems -   General ROS: Completed and except as mentioned above were me in ICU after admission to ICU, see critical care H/P

## 2018-01-27 NOTE — ED NOTES
RN at bedside, pt removed from Bi-PAP, pt unable to tolerate and placed back on Bi-PAP, Pt Nitro gtt increased at this time  Son remains at bedside, pt to be admitted, awaiting room assignment      Huy Steven RN  01/26/18 [id number] [address] [place] [address]

## 2018-01-27 NOTE — ED NOTES
Rn and tech at bedside,   Pt washed up and bed changed at this time  Pt remain son cardiac monitor and on Nitro drip  Medications given to charge nurse to be placed in chart      Kristi Bledsoe RN  01/26/18 8714

## 2018-01-27 NOTE — ED NOTES
Manclila inserted with 4 assists. Pt and linens changed. Pt with increased SOB when laying down and turned during mancilla insertion. Pt re-positioned for comfort, HOB at 90 degrees. Will cont to monitor.       Delmar Virgen RN  01/27/18 [address], RN  01/27/18 5773

## 2018-01-27 NOTE — H&P
Critical Care - History and Physical Examination    Patient's name:  Sukh Ortega  Medical Record Number: [id number]  Patient's account/billing number: [de-identified]  Patient's YOB: 1975  Age: 43 y.o. Date of Admission: 1/26/2018  8:09 PM  Date of History and Physical Examination: 1/27/2018      Primary Care Physician: No primary care provider on file. Attending Physician:    Code Status: No Order    Chief complaint:     HISTORY OF PRESENT ILLNESS:   History was obtained from chart review and the patient. Sukh Ortega is a 43 y.o. with past medical history morbid obesity, diastolic dysfunction, smoker, obesity hypoventilation syndrome, history of asthma presented to the emergency room for shortness of breath, cough for last 3-4 days. Per patient, her grandchild was sick one week back. Patient started to have a reactive cough with yellow to green sputum associated with shortness of breath. Patient has a history of asthma and she uses Spiriva and Advair inhalers at home. Per patient inhalers were not helping her out this time. In ER, on admission patient's blood pressure was 267/118 mmHg. Patient was started on nitroglycerin drip. Chest x-ray showed pulmonary edema. Patient received Lasix 20 mg once. Troponin elevated 0.05. Point-of-care troponin 0.21. Cardiology consulted for elevated troponin patient started on Heparin Drip. Arterial blood gas pH 7.386, PCO2 47.2, bicarb 28.3, PO2 71.7  Initially plan was  to admit the patient to the medicine floor. Patient becomes tachypneic with respiratory rate in 40s plan was made to admit patient to the medical ICU. Patient is an active smoker with 1-2 packs per day. Patient denies any alcohol history or any drug abuse. Past Medical History:        Diagnosis Date    COPD (chronic obstructive pulmonary disease) ([facility] [address]        Past Surgical History:  History reviewed. No pertinent surgical history. Allergies:     Allergies   Allergen cultures, Step pneumonia antigen and legionella antigen. Flu PCR as family member has flu. Start on tamiflu. 3. Acute respiratory failure; likely secondary to pneumonia and pulmonary edema. Patient has history of asthma too. Continue IV Solu-Medrol 40 mg every 6. Breathing treatments. Continue BiPAP for now. Repeat CXR tomorroe. 4. NSTEMI; troponin elevated on heparin drip likely secondary to demand ischemia. Cardiology on board. Trend troponin. 5. Smoker; nicotine patch. 6. DVT prophylaxis; on heparin drip. Beverlie Brittle, M.D. Department of Internal Medicine/ Critical care  [facility])             1/27/2018, 1:10 PM      Attending Physician Statement  I have discussed the care of Roxanne Pereira, including pertinent history and exam findings with the resident. I have reviewed the key elements of all parts of the encounter with the resident. I have seen and examined the patient with the resident. I agree with the assessment and plan and status of the problem list as documented. I have seen the patient after the patient had arrived in medical ICU after 1:30 PM  She was on BiPAP with the mask was able to talk and was able to get some history it was not always difficult to understand because of the BiPAP and tachypnea. She presented to emergency room apparently last night and started on BiPAP/noninvasive ventilation which she continued to be on until she was admitted to ICU initially she was supposed to go to CVICU but then because of continued need of BiPAP she was transferred to medical ICU.     Acute respiratory failure with bilateral pulmonary infiltrate according to available history she has cough she did not have any sputum production until today she did not complaining of fever but she has some chills according to her some flulike symptoms mild headache also she complained of chest pain and chest pain was right-sided, EKG did not show acute ST-T changes

## 2018-01-27 NOTE — PLAN OF CARE
Problem: RESPIRATORY  Intervention: Respiratory assessment  BRONCHOSPASM/BRONCHOCONSTRICTION     [x]         IMPROVE AERATION/BREATH SOUNDS  [x]   ADMINISTER BRONCHODILATOR THERAPY AS APPROPRIATE  [x]   ASSESS BREATH SOUNDS  [x]   IMPLEMENT AEROSOL/MDI PROTOCOL  [x]   PATIENT EDUCATION AS NEEDED    NON INVASIVE VENTILATION  PROVIDE OPTIMAL VENTILATION/ACCEPTABLE SP02  IMPLEMENT NON INVASIVE VENTILATION PROTOCOL  ASSESSMENT SKIN INTEGRITY  PATIENT EDUCATION AS NEEDED  BIPAP AS NEEDED  Su Talavera, RCPPatient Assessment complete. Pneumonia [J18.9]  NSTEMI (non-ST elevated myocardial infarction) ([facility] [address] [I21.4] . Vitals:    01/27/18 1418   BP:    Pulse:    Resp:    Temp: 100.4 °F (38 °C)   SpO2:    . Patients home meds are   Prior to Admission medications    Medication Sig Start Date End Date Taking? Authorizing Provider   furosemide (LASIX) 20 MG tablet Take 20 mg by mouth daily   Yes Historical Provider, MD   tiotropium (SPIRIVA) 18 MCG inhalation capsule Inhale 18 mcg into the lungs daily   Yes Historical Provider, MD   albuterol sulfate  (90 Base) MCG/ACT inhaler Inhale 2 puffs into the lungs every 6 hours as needed for Wheezing   Yes Historical Provider, MD   oxyCODONE-acetaminophen (PERCOCET)  MG per tablet Take 1 tablet by mouth every 6 hours as needed for Pain.    Yes Historical Provider, MD   ferrous sulfate 325 (65 Fe) MG tablet Take 325 mg by mouth 2 times daily (with meals)    Yes Historical Provider, MD   Fluticasone-Salmeterol (ADVAIR DISKUS IN) Inhale into the lungs   Yes Historical Provider, MD   fluticasone (FLONASE) 50 MCG/ACT nasal spray 1 spray by Nasal route daily   Yes Historical Provider, MD   nicotine (NICOTINE STEP 2) 14 MG/24HR Place 1 patch onto the skin every 24 hours   Yes Historical Provider, MD   amLODIPine (NORVASC) 10 MG tablet Take 10 mg by mouth daily    Historical Provider, MD   azithromycin (ZITHROMAX) 1 g powder Take 1 packet by mouth once    Historical Provider, MD hydrochlorothiazide (MICROZIDE) 12.5 MG capsule Take 12.5 mg by mouth every morning    Historical Provider, MD   lisinopril (PRINIVIL;ZESTRIL) 20 MG tablet Take 20 mg by mouth daily    Historical Provider, MD   loratadine (CLARITIN) 10 MG capsule Take 10 mg by mouth daily    Historical Provider, MD     Assessment     RR 42  Breath Sounds: crackles throughout      · Bronchodilator assessment at level  4  · Hyperinflation assessment at level   · Secretion Management assessment at level    ·   · [x]    Bronchodilator Assessment  BRONCHODILATOR ASSESSMENT SCORE  Score 0 1 2 3 4 5   Breath Sounds   []  Patient Baseline []  No Wheeze good aeration []  Faint, scattered wheezing, good aeration []  Expiratory Wheezing and or moderately diminished [x]  Insp/Exp wheeze and/or very diminished []  Insp/Exp and/ or marked distress   Respiratory Rate   []  Patient Baseline []  Less than 20 []  Less than 20 []  20-25 [x]  Greater than 25 [x]  Greater than 25   Peak flow % of Pred or PB [x]  NA   []  Greater than 90%  []  81-90% []  71-80% []  Less than or equal to 70%  or unable to perform []  Unable due to Respiratory Distress   Dyspnea re []  Patient Baseline []  No SOB []  No SOB []  SOB on exertion [x]  SOB min activity []  At rest/acute   e FEV% Predicted       [x]  NA []  Above 69%  []  Unable []  Above 60-69%  []  Unable []  Above 50-59%  []  Unable []  Above 35-49%  []  Unable []  Less than 35%  []  Unable

## 2018-01-27 NOTE — ED PROVIDER NOTES
[facility]  Emergency Department  Faculty Sign-Out Addendum     Care of Theresa Smith was assumed from previous attending and is being seen for Shortness of Breath and Respiratory Distress  . The patient's initial evaluation and plan have been discussed with the prior provider who initially evaluated the patient. EMERGENCY DEPARTMENT COURSE / MEDICAL DECISION MAKING:       MEDICATIONS GIVEN:  Orders Placed This Encounter   Medications    nitroGLYCERIN 50 mg in dextrose 5% 250 mL infusion    LORazepam (ATIVAN) injection 0.5 mg    levofloxacin (LEVAQUIN) 750 MG/150ML infusion 750 mg       LABS / RADIOLOGY:     Labs Reviewed   CBC - Abnormal; Notable for the following:        Result Value    WBC 14.8 (*)     Hemoglobin 11.1 (*)     RDW 15.1 (*)     All other components within normal limits   BASIC METABOLIC PANEL - Abnormal; Notable for the following:     Glucose 224 (*)     Potassium 3.6 (*)     Chloride 95 (*)     Anion Gap 20 (*)     All other components within normal limits   LACTIC ACID, WHOLE BLOOD - Abnormal; Notable for the following:     Lactic Acid, Whole Blood 5.5 (*)     All other components within normal limits   HEPATIC FUNCTION PANEL - Abnormal; Notable for the following:     Alkaline Phosphatase 121 (*)     All other components within normal limits   CULTURE BLOOD #1   CULTURE BLOOD #1   URINE CULTURE   RAPID INFLUENZA A/B ANTIGENS   CULTURE BLOOD #1   CULTURE BLOOD #2   BRAIN NATRIURETIC PEPTIDE   PROTIME-INR   APTT   LACTIC ACID, WHOLE BLOOD   URINALYSIS   POCT TROPONIN   POCT TROPONIN   POCT TROPONIN       Xr Chest Portable    Result Date: 1/26/2018  EXAMINATION: SINGLE VIEW OF THE CHEST 1/26/2018 8:42 pm COMPARISON: None. HISTORY: Ordering Physician Provided Reason for Exam: SOB; hypoxia FINDINGS: Mild cardiomegaly. Bilateral diffuse interstitial and airspace opacities. No discernible pneumothorax or sizable effusion.   Osseous structures grossly intact without
nitroglycerin infusion, aerosols. Anticipate admission. Sepsis Times and Checklist  Vital Signs: BP: (!) 197/91  Pulse: 95  Resp: 25  Temp: 98.6 °F (37 °C) SpO2: 96 %  SIRS (>2)   Temp > 38.3C or < 36C   HR > 90   RR > 20   WBC > 12 or < 4 or >10% bands  SIRS (>2) and confirmed or suspected source of infection = Sepsis    Sepsis Identified   Date: 1/26/18  Time: 9:13 PM    Sepsis Orders:   CBC: Yes   CMP: Yes   PT/PTT: Yes   Blood Cultures x2: Yes   Urinalysis and Urine Culture: Yes   Lactate: Yes   Broad Spectrum Antibiotics Given (within 3 hours of sepsis identification, after blood cultures): Yes              IV Crystalloid given: No    If lactate >2.0 MUST repeat within 6 hours    Is lactate > 4.0:  Yes  If lactate >4.0 OR hypotension 30ml/kg crystalloid MUST be ordered. Fluids must be completed within 3 hours of sepsis identification. Septic Shock Identified (Initial lactate >4.0 or persistent hypotension after 30ml/kg fluid bolus): For septic shock sepsis focus physical exam must be completed AND documented (within 6 hours). Date: 01/26/18 Time: 9:30 PM      Sepsis focus exam completed. For persistent hypotension after 30ml/kg fluid bolus vasopressors must be started (within 6 hours)    RADIOLOGY:  Xr Chest Portable    Result Date: 1/26/2018  EXAMINATION: SINGLE VIEW OF THE CHEST 1/26/2018 8:42 pm COMPARISON: None. HISTORY: Ordering Physician Provided Reason for Exam: SOB; hypoxia FINDINGS: Mild cardiomegaly. Bilateral diffuse interstitial and airspace opacities. No discernible pneumothorax or sizable effusion. Osseous structures grossly intact without acute process. Mild cardiomegaly with diffuse bilateral interstitial and airspace opacities. Findings likely relate to pulmonary edema though superimposed infectious/inflammatory process not excluded.  RECOMMENDATIONS: Radiographic follow-up to resolution     EKG  EKG Interpretation    Interpreted by me    Rhythm: normal sinus   Rate:

## 2018-01-27 NOTE — ED NOTES
Pt very anxious, stating \"bipap isnt helping\". RRT notified.       Leander Dominguez, MARTINE  01/27/18 6197

## 2018-01-28 ENCOUNTER — ANESTHESIA EVENT (OUTPATIENT)
Dept: ICU | Age: 43
DRG: 005 | End: 2018-01-28
Payer: MEDICARE

## 2018-01-28 ENCOUNTER — APPOINTMENT (OUTPATIENT)
Dept: GENERAL RADIOLOGY | Age: 43
DRG: 005 | End: 2018-01-28
Payer: MEDICARE

## 2018-01-28 ENCOUNTER — ANESTHESIA (OUTPATIENT)
Dept: ICU | Age: 43
DRG: 005 | End: 2018-01-28
Payer: MEDICARE

## 2018-01-28 LAB
ALLEN TEST: POSITIVE
ALLEN TEST: POSITIVE
AMPHETAMINE SCREEN URINE: NEGATIVE
ANION GAP SERPL CALCULATED.3IONS-SCNC: 16 MMOL/L (ref 9–17)
BARBITURATE SCREEN URINE: NEGATIVE
BENZODIAZEPINE SCREEN, URINE: NEGATIVE
BUN BLDV-MCNC: 10 MG/DL (ref 6–20)
BUN/CREAT BLD: ABNORMAL (ref 9–20)
BUPRENORPHINE URINE: ABNORMAL
CALCIUM SERPL-MCNC: 8.7 MG/DL (ref 8.6–10.4)
CANNABINOID SCREEN URINE: NEGATIVE
CHLORIDE BLD-SCNC: 100 MMOL/L (ref 98–107)
CO2: 23 MMOL/L (ref 20–31)
COCAINE METABOLITE, URINE: NEGATIVE
CREAT SERPL-MCNC: 0.63 MG/DL (ref 0.5–0.9)
CULTURE: NORMAL
CULTURE: NORMAL
ESTIMATED AVERAGE GLUCOSE: 103 MG/DL
FIO2: 100
FIO2: 85
GFR AFRICAN AMERICAN: >60 ML/MIN
GFR NON-AFRICAN AMERICAN: >60 ML/MIN
GFR SERPL CREATININE-BSD FRML MDRD: ABNORMAL ML/MIN/{1.73_M2}
GFR SERPL CREATININE-BSD FRML MDRD: ABNORMAL ML/MIN/{1.73_M2}
GLUCOSE BLD-MCNC: 157 MG/DL (ref 65–105)
GLUCOSE BLD-MCNC: 167 MG/DL (ref 70–99)
GLUCOSE BLD-MCNC: 169 MG/DL (ref 65–105)
GLUCOSE BLD-MCNC: 185 MG/DL (ref 74–100)
HBA1C MFR BLD: 5.2 % (ref 4–6)
HCT VFR BLD CALC: 34.7 % (ref 36.3–47.1)
HEMOGLOBIN: 9.7 G/DL (ref 11.9–15.1)
Lab: NORMAL
MAGNESIUM: 1.6 MG/DL (ref 1.6–2.6)
MCH RBC QN AUTO: 25.5 PG (ref 25.2–33.5)
MCHC RBC AUTO-ENTMCNC: 28 G/DL (ref 28.4–34.8)
MCV RBC AUTO: 91.3 FL (ref 82.6–102.9)
MDMA URINE: ABNORMAL
METHADONE SCREEN, URINE: NEGATIVE
METHAMPHETAMINE, URINE: ABNORMAL
MODE: ABNORMAL
MODE: ABNORMAL
NEGATIVE BASE EXCESS, ART: ABNORMAL (ref 0–2)
NEGATIVE BASE EXCESS, ART: ABNORMAL (ref 0–2)
NRBC AUTOMATED: 0 PER 100 WBC
O2 DEVICE/FLOW/%: ABNORMAL
O2 DEVICE/FLOW/%: ABNORMAL
OPIATES, URINE: NEGATIVE
OXYCODONE SCREEN URINE: POSITIVE
PARTIAL THROMBOPLASTIN TIME: 30.2 SEC (ref 21.3–31.3)
PATIENT TEMP: ABNORMAL
PATIENT TEMP: ABNORMAL
PDW BLD-RTO: 15.8 % (ref 11.8–14.4)
PHENCYCLIDINE, URINE: NEGATIVE
PLATELET # BLD: 220 K/UL (ref 138–453)
PMV BLD AUTO: 10.2 FL (ref 8.1–13.5)
POC HCO3: 30.3 MMOL/L (ref 21–28)
POC HCO3: 31.3 MMOL/L (ref 21–28)
POC O2 SATURATION: 91 % (ref 94–98)
POC O2 SATURATION: 97 % (ref 94–98)
POC PCO2 TEMP: ABNORMAL MM HG
POC PCO2 TEMP: ABNORMAL MM HG
POC PCO2: 51.2 MM HG (ref 35–48)
POC PCO2: 62.9 MM HG (ref 35–48)
POC PH TEMP: ABNORMAL
POC PH TEMP: ABNORMAL
POC PH: 7.3 (ref 7.35–7.45)
POC PH: 7.38 (ref 7.35–7.45)
POC PO2 TEMP: ABNORMAL MM HG
POC PO2 TEMP: ABNORMAL MM HG
POC PO2: 107.9 MM HG (ref 83–108)
POC PO2: 63.2 MM HG (ref 83–108)
POSITIVE BASE EXCESS, ART: 4 (ref 0–3)
POSITIVE BASE EXCESS, ART: 4 (ref 0–3)
POTASSIUM SERPL-SCNC: 3.7 MMOL/L (ref 3.7–5.3)
PROPOXYPHENE, URINE: ABNORMAL
RBC # BLD: 3.8 M/UL (ref 3.95–5.11)
SAMPLE SITE: ABNORMAL
SAMPLE SITE: ABNORMAL
SODIUM BLD-SCNC: 139 MMOL/L (ref 135–144)
SPECIMEN DESCRIPTION: NORMAL
STATUS: NORMAL
TCO2 (CALC), ART: 32 MMOL/L (ref 22–29)
TCO2 (CALC), ART: 33 MMOL/L (ref 22–29)
TEST INFORMATION: ABNORMAL
TRICYCLIC ANTIDEPRESSANTS, UR: ABNORMAL
TROPONIN INTERP: NORMAL
TROPONIN T: <0.03 NG/ML
WBC # BLD: 21.6 K/UL (ref 3.5–11.3)

## 2018-01-28 PROCEDURE — 6370000000 HC RX 637 (ALT 250 FOR IP): Performed by: HOSPITALIST

## 2018-01-28 PROCEDURE — 6360000002 HC RX W HCPCS

## 2018-01-28 PROCEDURE — 6360000002 HC RX W HCPCS: Performed by: STUDENT IN AN ORGANIZED HEALTH CARE EDUCATION/TRAINING PROGRAM

## 2018-01-28 PROCEDURE — 2500000003 HC RX 250 WO HCPCS: Performed by: STUDENT IN AN ORGANIZED HEALTH CARE EDUCATION/TRAINING PROGRAM

## 2018-01-28 PROCEDURE — 36600 WITHDRAWAL OF ARTERIAL BLOOD: CPT

## 2018-01-28 PROCEDURE — 82803 BLOOD GASES ANY COMBINATION: CPT

## 2018-01-28 PROCEDURE — 94660 CPAP INITIATION&MGMT: CPT

## 2018-01-28 PROCEDURE — 5A1955Z RESPIRATORY VENTILATION, GREATER THAN 96 CONSECUTIVE HOURS: ICD-10-PCS | Performed by: INTERNAL MEDICINE

## 2018-01-28 PROCEDURE — 6370000000 HC RX 637 (ALT 250 FOR IP): Performed by: STUDENT IN AN ORGANIZED HEALTH CARE EDUCATION/TRAINING PROGRAM

## 2018-01-28 PROCEDURE — 6360000002 HC RX W HCPCS: Performed by: INTERNAL MEDICINE

## 2018-01-28 PROCEDURE — 6360000002 HC RX W HCPCS: Performed by: HOSPITALIST

## 2018-01-28 PROCEDURE — 2580000003 HC RX 258: Performed by: STUDENT IN AN ORGANIZED HEALTH CARE EDUCATION/TRAINING PROGRAM

## 2018-01-28 PROCEDURE — 94770 HC ETCO2 MONITOR DAILY: CPT

## 2018-01-28 PROCEDURE — 2500000003 HC RX 250 WO HCPCS

## 2018-01-28 PROCEDURE — 2000000000 HC ICU R&B

## 2018-01-28 PROCEDURE — 82947 ASSAY GLUCOSE BLOOD QUANT: CPT

## 2018-01-28 PROCEDURE — 71045 X-RAY EXAM CHEST 1 VIEW: CPT

## 2018-01-28 PROCEDURE — 36415 COLL VENOUS BLD VENIPUNCTURE: CPT

## 2018-01-28 PROCEDURE — 94762 N-INVAS EAR/PLS OXIMTRY CONT: CPT

## 2018-01-28 PROCEDURE — 99291 CRITICAL CARE FIRST HOUR: CPT | Performed by: INTERNAL MEDICINE

## 2018-01-28 PROCEDURE — 0BH18EZ INSERTION OF ENDOTRACHEAL AIRWAY INTO TRACHEA, VIA NATURAL OR ARTIFICIAL OPENING ENDOSCOPIC: ICD-10-PCS | Performed by: ANESTHESIOLOGY

## 2018-01-28 PROCEDURE — 80048 BASIC METABOLIC PNL TOTAL CA: CPT

## 2018-01-28 PROCEDURE — 94002 VENT MGMT INPAT INIT DAY: CPT

## 2018-01-28 PROCEDURE — 85027 COMPLETE CBC AUTOMATED: CPT

## 2018-01-28 PROCEDURE — 94640 AIRWAY INHALATION TREATMENT: CPT

## 2018-01-28 RX ORDER — PROPOFOL 10 MG/ML
10 INJECTION, EMULSION INTRAVENOUS
Status: DISCONTINUED | OUTPATIENT
Start: 2018-01-28 | End: 2018-01-28

## 2018-01-28 RX ORDER — FENTANYL CITRATE 50 UG/ML
50 INJECTION, SOLUTION INTRAMUSCULAR; INTRAVENOUS
Status: DISCONTINUED | OUTPATIENT
Start: 2018-01-28 | End: 2018-02-11

## 2018-01-28 RX ORDER — DEXTROSE MONOHYDRATE 25 G/50ML
12.5 INJECTION, SOLUTION INTRAVENOUS PRN
Status: DISCONTINUED | OUTPATIENT
Start: 2018-01-28 | End: 2018-02-23 | Stop reason: HOSPADM

## 2018-01-28 RX ORDER — FAMOTIDINE 20 MG/1
20 TABLET, FILM COATED ORAL 2 TIMES DAILY
Status: DISCONTINUED | OUTPATIENT
Start: 2018-01-28 | End: 2018-02-23 | Stop reason: HOSPADM

## 2018-01-28 RX ORDER — FUROSEMIDE 10 MG/ML
20 INJECTION INTRAMUSCULAR; INTRAVENOUS ONCE
Status: COMPLETED | OUTPATIENT
Start: 2018-01-28 | End: 2018-01-28

## 2018-01-28 RX ORDER — LIDOCAINE HYDROCHLORIDE 40 MG/ML
4 SOLUTION TOPICAL ONCE
Status: DISCONTINUED | OUTPATIENT
Start: 2018-01-28 | End: 2018-01-28

## 2018-01-28 RX ORDER — NICOTINE POLACRILEX 4 MG
15 LOZENGE BUCCAL PRN
Status: DISCONTINUED | OUTPATIENT
Start: 2018-01-28 | End: 2018-02-23 | Stop reason: HOSPADM

## 2018-01-28 RX ORDER — LORAZEPAM 2 MG/ML
0.5 INJECTION INTRAMUSCULAR ONCE
Status: COMPLETED | OUTPATIENT
Start: 2018-01-28 | End: 2018-01-28

## 2018-01-28 RX ORDER — LISINOPRIL 20 MG/1
20 TABLET ORAL DAILY
Status: DISCONTINUED | OUTPATIENT
Start: 2018-01-29 | End: 2018-01-29

## 2018-01-28 RX ORDER — MAGNESIUM SULFATE 1 G/100ML
1 INJECTION INTRAVENOUS
Status: COMPLETED | OUTPATIENT
Start: 2018-01-28 | End: 2018-01-28

## 2018-01-28 RX ORDER — FENTANYL CITRATE 50 UG/ML
100 INJECTION, SOLUTION INTRAMUSCULAR; INTRAVENOUS
Status: DISCONTINUED | OUTPATIENT
Start: 2018-01-28 | End: 2018-02-11

## 2018-01-28 RX ORDER — DEXTROSE MONOHYDRATE 50 MG/ML
100 INJECTION, SOLUTION INTRAVENOUS PRN
Status: DISCONTINUED | OUTPATIENT
Start: 2018-01-28 | End: 2018-02-23 | Stop reason: HOSPADM

## 2018-01-28 RX ORDER — PROPOFOL 10 MG/ML
20 INJECTION, EMULSION INTRAVENOUS
Status: DISCONTINUED | OUTPATIENT
Start: 2018-01-28 | End: 2018-02-18

## 2018-01-28 RX ORDER — PROPOFOL 10 MG/ML
INJECTION, EMULSION INTRAVENOUS
Status: COMPLETED
Start: 2018-01-28 | End: 2018-01-28

## 2018-01-28 RX ADMIN — FENTANYL CITRATE 50 MCG: 50 INJECTION INTRAMUSCULAR; INTRAVENOUS at 15:54

## 2018-01-28 RX ADMIN — AMLODIPINE BESYLATE 10 MG: 10 TABLET ORAL at 13:18

## 2018-01-28 RX ADMIN — FAMOTIDINE 20 MG: 20 TABLET, FILM COATED ORAL at 19:51

## 2018-01-28 RX ADMIN — ALBUTEROL SULFATE 2.5 MG: 2.5 SOLUTION RESPIRATORY (INHALATION) at 03:55

## 2018-01-28 RX ADMIN — OSELTAMIVIR PHOSPHATE 75 MG: 75 CAPSULE ORAL at 19:50

## 2018-01-28 RX ADMIN — FENTANYL CITRATE 50 MCG: 50 INJECTION INTRAMUSCULAR; INTRAVENOUS at 17:59

## 2018-01-28 RX ADMIN — Medication 10 ML: at 19:51

## 2018-01-28 RX ADMIN — NICARDIPINE HYDROCHLORIDE 12.5 MG/HR: 0.1 INJECTION, SOLUTION INTRAVENOUS at 20:54

## 2018-01-28 RX ADMIN — HEPARIN SODIUM 2000 UNITS: 1000 INJECTION, SOLUTION INTRAVENOUS; SUBCUTANEOUS at 00:55

## 2018-01-28 RX ADMIN — AZITHROMYCIN MONOHYDRATE 500 MG: 500 INJECTION, POWDER, LYOPHILIZED, FOR SOLUTION INTRAVENOUS at 14:54

## 2018-01-28 RX ADMIN — LORAZEPAM 0.5 MG: 2 INJECTION INTRAMUSCULAR; INTRAVENOUS at 20:59

## 2018-01-28 RX ADMIN — METHYLPREDNISOLONE SODIUM SUCCINATE 40 MG: 125 INJECTION, POWDER, FOR SOLUTION INTRAMUSCULAR; INTRAVENOUS at 19:51

## 2018-01-28 RX ADMIN — HYDROCHLOROTHIAZIDE 12.5 MG: 12.5 CAPSULE ORAL at 13:18

## 2018-01-28 RX ADMIN — MAGNESIUM SULFATE HEPTAHYDRATE 1 G: 1 INJECTION, SOLUTION INTRAVENOUS at 09:07

## 2018-01-28 RX ADMIN — LISINOPRIL 10 MG: 10 TABLET ORAL at 13:18

## 2018-01-28 RX ADMIN — WATER 1 G: 1 INJECTION INTRAMUSCULAR; INTRAVENOUS; SUBCUTANEOUS at 14:46

## 2018-01-28 RX ADMIN — Medication 10 MG/HR: at 22:17

## 2018-01-28 RX ADMIN — OSELTAMIVIR PHOSPHATE 75 MG: 75 CAPSULE ORAL at 15:31

## 2018-01-28 RX ADMIN — IPRATROPIUM BROMIDE AND ALBUTEROL SULFATE 1 AMPULE: .5; 3 SOLUTION RESPIRATORY (INHALATION) at 08:52

## 2018-01-28 RX ADMIN — PROPOFOL 20 MCG/KG/MIN: 10 INJECTION, EMULSION INTRAVENOUS at 21:47

## 2018-01-28 RX ADMIN — FENTANYL CITRATE 100 MCG: 50 INJECTION INTRAMUSCULAR; INTRAVENOUS at 22:11

## 2018-01-28 RX ADMIN — FENTANYL CITRATE 50 MCG: 50 INJECTION INTRAMUSCULAR; INTRAVENOUS at 05:07

## 2018-01-28 RX ADMIN — IPRATROPIUM BROMIDE AND ALBUTEROL SULFATE 1 AMPULE: .5; 3 SOLUTION RESPIRATORY (INHALATION) at 12:16

## 2018-01-28 RX ADMIN — NICARDIPINE HYDROCHLORIDE 5 MG/HR: 0.1 INJECTION, SOLUTION INTRAVENOUS at 18:27

## 2018-01-28 RX ADMIN — FENTANYL CITRATE 50 MCG: 50 INJECTION INTRAMUSCULAR; INTRAVENOUS at 13:33

## 2018-01-28 RX ADMIN — PROPOFOL 50 MCG/KG/MIN: 10 INJECTION, EMULSION INTRAVENOUS at 22:10

## 2018-01-28 RX ADMIN — MAGNESIUM SULFATE HEPTAHYDRATE 1 G: 1 INJECTION, SOLUTION INTRAVENOUS at 10:32

## 2018-01-28 RX ADMIN — DESMOPRESSIN ACETATE 40 MG: 0.2 TABLET ORAL at 19:50

## 2018-01-28 RX ADMIN — FENTANYL CITRATE 50 MCG: 50 INJECTION INTRAMUSCULAR; INTRAVENOUS at 08:27

## 2018-01-28 RX ADMIN — METHYLPREDNISOLONE SODIUM SUCCINATE 40 MG: 125 INJECTION, POWDER, FOR SOLUTION INTRAMUSCULAR; INTRAVENOUS at 02:15

## 2018-01-28 RX ADMIN — NICARDIPINE HYDROCHLORIDE 2.5 MG/HR: 0.1 INJECTION, SOLUTION INTRAVENOUS at 04:23

## 2018-01-28 RX ADMIN — IPRATROPIUM BROMIDE AND ALBUTEROL SULFATE 1 AMPULE: .5; 3 SOLUTION RESPIRATORY (INHALATION) at 16:27

## 2018-01-28 RX ADMIN — IPRATROPIUM BROMIDE AND ALBUTEROL SULFATE 1 AMPULE: .5; 3 SOLUTION RESPIRATORY (INHALATION) at 19:25

## 2018-01-28 RX ADMIN — ONDANSETRON 4 MG: 2 INJECTION INTRAMUSCULAR; INTRAVENOUS at 21:23

## 2018-01-28 RX ADMIN — NICARDIPINE HYDROCHLORIDE 7.5 MG/HR: 0.1 INJECTION, SOLUTION INTRAVENOUS at 00:58

## 2018-01-28 RX ADMIN — METHYLPREDNISOLONE SODIUM SUCCINATE 40 MG: 125 INJECTION, POWDER, FOR SOLUTION INTRAMUSCULAR; INTRAVENOUS at 10:23

## 2018-01-28 RX ADMIN — NICARDIPINE HYDROCHLORIDE 7.5 MG/HR: 0.1 INJECTION, SOLUTION INTRAVENOUS at 08:40

## 2018-01-28 RX ADMIN — NICARDIPINE HYDROCHLORIDE 7.5 MG/HR: 0.1 INJECTION, SOLUTION INTRAVENOUS at 14:38

## 2018-01-28 RX ADMIN — PROPOFOL 50 MCG/KG/MIN: 10 INJECTION, EMULSION INTRAVENOUS at 23:14

## 2018-01-28 RX ADMIN — FENTANYL CITRATE 50 MCG: 50 INJECTION INTRAMUSCULAR; INTRAVENOUS at 10:30

## 2018-01-28 RX ADMIN — NICARDIPINE HYDROCHLORIDE 5 MG/HR: 0.1 INJECTION, SOLUTION INTRAVENOUS at 23:14

## 2018-01-28 RX ADMIN — FENTANYL CITRATE 50 MCG: 50 INJECTION INTRAMUSCULAR; INTRAVENOUS at 20:04

## 2018-01-28 RX ADMIN — FUROSEMIDE 20 MG: 10 INJECTION, SOLUTION INTRAMUSCULAR; INTRAVENOUS at 09:03

## 2018-01-28 RX ADMIN — NICARDIPINE HYDROCHLORIDE 7.5 MG/HR: 0.1 INJECTION, SOLUTION INTRAVENOUS at 11:15

## 2018-01-28 RX ADMIN — METHYLPREDNISOLONE SODIUM SUCCINATE 40 MG: 125 INJECTION, POWDER, FOR SOLUTION INTRAMUSCULAR; INTRAVENOUS at 14:53

## 2018-01-28 ASSESSMENT — PULMONARY FUNCTION TESTS
PIF_VALUE: 32
PIF_VALUE: 31
PIF_VALUE: 28
PIF_VALUE: 48
PIF_VALUE: 32
PIF_VALUE: 41
PIF_VALUE: 33
PIF_VALUE: 32
PIF_VALUE: 33
PIF_VALUE: 34
PIF_VALUE: 31
PIF_VALUE: 32
PIF_VALUE: 30
PIF_VALUE: 31
PIF_VALUE: 32
PIF_VALUE: 31
PIF_VALUE: 30
PIF_VALUE: 32
PIF_VALUE: 34

## 2018-01-28 ASSESSMENT — PAIN SCALES - GENERAL
PAINLEVEL_OUTOF10: 8
PAINLEVEL_OUTOF10: 8
PAINLEVEL_OUTOF10: 7
PAINLEVEL_OUTOF10: 8
PAINLEVEL_OUTOF10: 7
PAINLEVEL_OUTOF10: 7
PAINLEVEL_OUTOF10: 6

## 2018-01-28 NOTE — CONSULTS
20 mg, Oral, Daily    nicotine, 1 patch, Transdermal, Daily    MDI Treatment, , , BID **AND** mometasone-formoterol, 2 puff, Inhalation, BID    azithromycin, 500 mg, Intravenous, Q24H    cefTRIAXone (ROCEPHIN) IV, 1 g, Intravenous, Q24H    sodium chloride flush, 10 mL, Intravenous, 2 times per day    methylPREDNISolone, 40 mg, Intravenous, Q6H    oseltamivir, 75 mg, Oral, BID    atorvastatin, 40 mg, Oral, Nightly    albuterol, 2.5 mg, Nebulization, BID    hydrochlorothiazide, 12.5 mg, Oral, QAM    amLODIPine, 10 mg, Oral, Daily    ipratropium-albuterol, 1 ampule, Inhalation, 4x daily      Allergies:  Asa [aspirin] and Sulfa antibiotics    Social History:   reports that she has been smoking. She has been smoking about 0.50 packs per day. She has never used smokeless tobacco. She reports that she does not use drugs. Family History: family history is not on file. No h/o sudden cardiac death. REVIEW OF SYSTEMS:    · Constitutional: there has been no unanticipated weight loss. There's been No change in energy level, No change in activity level. · Eyes: No visual changes or diplopia. No scleral icterus. · ENT: No Headaches, hearing loss or vertigo. No mouth sores or sore throat. · Cardiovascular: No cardiac history  · Respiratory: No previous pulmonary problems  · Gastrointestinal: No abdominal pain, appetite loss, blood in stools. No change in bowel or bladder habits. · Genitourinary: No dysuria, trouble voiding, or hematuria. · Musculoskeletal:  No gait disturbance, No weakness or joint complaints. · Integumentary: No rash or pruritis. · Neurological: No headache, diplopia, change in muscle strength, numbness or tingling. No change in gait, balance, coordination, mood, affect, memory, mentation, behavior. · Psychiatric: No anxiety, or depression. · Endocrine: No temperature intolerance. No excessive thirst, fluid intake, or urination. No tremor.   · Hematologic/Lymphatic: No abnormal

## 2018-01-28 NOTE — CONSULTS
[facility] - Initial Consult Note  Today's Date and Time: 1/28/2018, 2:16 PM    Impression :   1. COPD  2. Pulmonary edema   3. Hypertensive emergency  4. Community acquired pneumonia  5. Acute respiratory failure  6. NSTEMI    Recommendations:   · No indication for peramivir at this time pending influenza PCR result  · Patient has agreed to take Tamiflu    Chief complaint/reason for consultation:   · Questioning if there is indication for peramivir if unable to take tamiflu PO      History of Present Illness:     INITIAL HISTORY:  Amos Estevez is a 43y.o.-year-old  female who was initially admitted on 1/26/2018. Patient seen at the request of Dr. Humera Ling. Patient w/ hx of COPD on home O2 2.5L initially presented to ED for shortness of breath w/ chest pain. For past week patient has had increased cough and production of yellowish sputum w/ progressively increasing SOB along w/ feelings of fever. In ED she was found to have O2 saturation in 30s and 40s, hypertension of 187/81 and a cxr showing potential multifocal pneumonia and pulmonary edema w/ leukocytosis and increased troponins. She was given aspirin and Plavix in ED and admitted. On admission she was placed on ceftriaxone and azithromycin for potential pneumonia and given solumedrol q8, lasix BID, and cardene for hypertension and pulmonary edema. We were consulted because the influenza PCR results are pending and patient is unable to take tamiflu PO. Inpatient team is requesting input regarding starting peramivir. CURRENT EVALUATION: 1/28/2018     Tachypnea on BiPAP, hypertensive, /53  Able to respond appropriately to questions  Complains of nausea, throat pain, and difficulties swallowing  SOB improved on BiPAP, continues to have cough and sputum production, but improved from admission  Denies fevers, chills, nightsweats, dysuria, diarrhea.      CULTURES  1/26/18 blood cx: no on file       Family History:   History reviewed. No pertinent family history. Allergies:   Asa [aspirin] and Sulfa antibiotics     Review of Systems:   Constitutional: No fevers or chills. No systemic complaints  Head: No headaches  Eyes: No double vision or blurry vision. No conjunctival inflammation. ENT: No sore throat or runny nose. . No hearing loss, tinnitus or vertigo. Cardiovascular: No chest pain or palpitations. shortness of breath. MONIQUE  Lung: Shortness of breath or cough. sputum production  Abdomen: No nausea, vomiting, diarrhea, or abdominal pain. Kim Riedel No cramps. Genitourinary: No increased urinary frequency, or dysuria. No hematuria. No suprapubic or CVA pain  Musculoskeletal: No muscle aches or pains. No joint effusions, swelling or deformities  Hematologic: No bleeding or bruising. Neurologic: No headache, weakness, numbness, or tingling. Integument: No rash, no ulcers. Psychiatric: No depression. Endocrine: No polyuria, no polydipsia, no polyphagia.     Physical Examination :   Patient Vitals for the past 8 hrs:   BP Temp Temp src Pulse Resp SpO2   01/28/18 1315 - 99 °F (37.2 °C) Axillary - - -   01/28/18 1217 - - - 101 (!) 33 97 %   01/28/18 1100 (!) 141/53 - - 101 30 97 %   01/28/18 1045 (!) 152/58 - - 107 (!) 33 98 %   01/28/18 1030 (!) 144/63 - - 104 (!) 33 97 %   01/28/18 1015 127/60 - - 101 (!) 33 98 %   01/28/18 1000 (!) 99/52 - - 100 (!) 35 98 %   01/28/18 0945 (!) 137/51 - - 92 (!) 33 98 %   01/28/18 0930 136/61 - - 100 (!) 32 98 %   01/28/18 0915 (!) 128/51 - - 103 (!) 33 97 %   01/28/18 0900 (!) 152/54 - - 116 (!) 32 94 %   01/28/18 0859 - 99.5 °F (37.5 °C) Axillary - - -   01/28/18 0856 - - - 108 (!) 34 94 %   01/28/18 0845 (!) 168/65 - - 112 (!) 35 95 %   01/28/18 0830 (!) 166/60 - - 103 (!) 37 97 %   01/28/18 0815 (!) 164/69 - - 101 (!) 36 97 %   01/28/18 0800 (!) 160/66 - - 104 (!) 36 97 %   01/28/18 0745 (!) 165/70 - - 100 (!) 35 97 %   01/28/18 0730 (!) 153/60 - - 95 (!) 31 96 %   01/28/18 0715 (!) 146/57 - - 97 (!) 36 97 %   01/28/18 0700 (!) 146/60 - - 97 (!) 35 97 %   01/28/18 0645 (!) 156/66 - - 98 (!) 35 97 %   01/28/18 0630 (!) 176/62 - - 100 (!) 37 97 %     General Appearance: Awake, alert, and in no apparent distress  Head:  Normocephalic, no trauma  Eyes: Pupils equal, round, reactive to light and accommodation; extraocular movements intact; sclera anicteric; conjunctivae pink. No embolic phenomena. ENT: Oropharynx clear, without erythema, exudate, or thrush. No tenderness of sinuses. Mouth/throat: mucosa pink and moist. No lesions. Dentition in good repair. Neck:Supple, without lymphadenopathy. Thyroid normal, No bruits. Pulmonary/Chest: Mild coarse breath sounds bilaterally, reduced lung sounds at bases. Cardiovascular: Regular rate and rhythm without murmurs, rubs, or gallops. Abdomen: Soft, non tender. Bowel sounds normal. No organomegaly  All four Extremities: No cyanosis, clubbing, edema, or effusions. Neurologic: No gross sensory or motor deficits. Skin: Warm and dry with good turgor. No signs of peripheral arterial or venous insufficiency. No ulcerations. No open wounds.     Medical Decision Making -Laboratory:   I have independently reviewed/ordered the following labs:  EXAMINATION:   SINGLE VIEW OF THE CHEST       1/28/2018 5:31 am       COMPARISON:   01/27/2018       HISTORY:   ORDERING SYSTEM PROVIDED HISTORY: pulomnary edema follow up   TECHNOLOGIST PROVIDED HISTORY:   Reason for exam:->pulomnary edema follow up       FINDINGS:   There is stable cardiomegaly.  There are worsened diffuse bilateral airspace   opacities involving the upper and lower lobes.  No pleural effusion or   pneumothorax identified.           Impression   Interval worsening of diffuse bilateral airspace opacities suggesting a   multilobar pneumonia or pulmonary edema.           CBC with Differential: Recent Labs      01/26/18 2038 01/28/18   1000   WBC  14.8*  21.6*   HGB  11.1*  9.7*

## 2018-01-28 NOTE — PLAN OF CARE
Problem: RESPIRATORY  Intervention: Respiratory assessment  BRONCHOSPASM/BRONCHOCONSTRICTION     [x]         IMPROVE AERATION/BREATH SOUNDS  [x]   ADMINISTER BRONCHODILATOR THERAPY AS APPROPRIATE  [x]   ASSESS BREATH SOUNDS  [x]   IMPLEMENT AEROSOL/MDI PROTOCOL  [x]   PATIENT EDUCATION AS NEEDED    NON INVASIVE VENTILATION  PROVIDE OPTIMAL VENTILATION/ACCEPTABLE SP02  IMPLEMENT NON INVASIVE VENTILATION PROTOCOL  ASSESSMENT SKIN INTEGRITY  PATIENT EDUCATION AS NEEDED  BIPAP AS NEEDED

## 2018-01-28 NOTE — FLOWSHEET NOTE
Writer was present in room when Dr. Lonnie Yun discussed possible intubation if the patient happens to deteriorate. Patient stated that she wishes for her sister Denia to make the decision of intubation if the patient cannot make the decision herself . She also stated she doesn't want intubation unless it is absolutely needed.

## 2018-01-28 NOTE — PROGRESS NOTES
Critical Care Team - Daily Progress Note      Date and time: 1/28/2018 7:46 AM  Patient's name:  Sandie Miranda  Medical Record Number: [id number]  Patient's account/billing number: [de-identified]  Patient's YOB: 1975  Age: 43 y.o. Date of Admission: 1/26/2018  8:09 PM  Length of stay during current admission: 2      Primary Care Physician: No primary care provider on file. ICU Attending Physician: Dr. Jackie Arriola MD    Code Status: Full Code    Reason for ICU admission: SOB, Pneumonia, Hypertensive Emergency. SUBJECTIVE:     OVERNIGHT EVENTS:         Wore NIV overnight  Pt reports she feels she is breathing easier. Intake 3727  Out 2705  Hypertension better controlled on Cardene. Tmax 100.6 overnight. ABG's: 7.38 / 51.2 / 63.2 / 30.2 / 91% on 85% FIO2 on BiPAP  Respiratory acidosis and Metabolic Alkalosis.      AWAKE & FOLLOWING COMMANDS:  [] No   [x] Yes    CURRENT VENTILATION STATUS:     [] Ventilator  [x] BIPAP 85% [] Nasal Cannula [] Room Air      IF INTUBATED, ET TUBE MARKING AT LOWER LIP:       cms    SECRETIONS Amount:  [] Small [] Moderate  [] Large  [x] None  Color:     [] White [] Colored  [] Bloody    SEDATION:  RAAS Score:  [] Propofol gtt  [] Versed gtt  [] Ativan gtt   [x] No Sedation    PARALYZED:  [x] No    [] Yes    DIARRHEA:                [x] No                [] Yes  (C. Difficile status: [] positive                                                                                                                       [] negative                                                                                                                     [] pending)    VASOPRESSORS:  [x] No    [] Yes    If yes -   [] Levophed       [] Dopamine     [] Vasopressin       [] Dobutamine  [] Phenylephrine         [] Epinephrine    CENTRAL LINES:     [x] No   [] Yes   (Date of Insertion:   )           If yes -     [] Right IJ     [] Left IJ [] Right Femoral [] Left Femoral ROS: Fatigue and tiredness. Respiratory ROS: cough, shortness of breath, wheezing  Cardiovascular ROS:  dyspnea on exertion  Gastrointestinal ROS:negative  Genito-Urinary ROS: negative  Musculoskeletal ROS: negative  Neurological ROS: negative  Dermatological ROS: negative    PHYSICAL EXAMINATION:    Constitutional: Morbidly Obese, appears mildly distressed, breathing easier on Biapap  EENT: PERRLA, EOMI, sclera clear, anicteric, oropharynx clear, no lesions, neck supple with midline trachea. Neck: Supple, symmetrical, trachea midline, no adenopathy, thyroid symmetric, no jvd skin normal  Respiratory: On BiPAP, bilateral expiratory wheezing with prolonged expiration.   Cardiovascular: regular rate and rhythm, normal S1, S2, no murmur noted and 2+ pulses throughout  Abdomen: soft, nontender, nondistended, no masses or organomegaly  Extremities: Bilateral lower extremity  2+ pitting edema      Any additional physical findings:    MEDICATIONS:    Scheduled Meds:   magnesium sulfate  2 g Intravenous Once    nicotine  1 patch Transdermal Daily    mometasone-formoterol  2 puff Inhalation BID    azithromycin  500 mg Intravenous Q24H    cefTRIAXone (ROCEPHIN) IV  1 g Intravenous Q24H    sodium chloride flush  10 mL Intravenous 2 times per day    methylPREDNISolone  40 mg Intravenous Q6H    oseltamivir  75 mg Oral BID    atorvastatin  40 mg Oral Nightly    albuterol  2.5 mg Nebulization BID    hydrochlorothiazide  12.5 mg Oral QAM    amLODIPine  10 mg Oral Daily    lisinopril  10 mg Oral Daily    ipratropium-albuterol  1 ampule Inhalation 4x daily     Continuous Infusions:   niCARdipine 7.5 mg/hr (01/28/18 0635)    heparin (porcine) 12.48 Units/kg/hr (01/28/18 0055)     PRN Meds:     magnesium hydroxide 30 mL Daily PRN   ondansetron 4 mg Q6H PRN   potassium chloride 40 mEq PRN   Or     potassium chloride 40 mEq PRN   Or     potassium chloride 10 mEq PRN   sodium chloride flush 10 mL PRN   acetaminophen 650 mg Q4H PRN   heparin (porcine) 4,000 Units PRN   heparin (porcine) 2,000 Units PRN   albuterol 2.5 mg As Directed RT PRN   fentanNYL 50 mcg Q1H PRN         VENT SETTINGS (Comprehensive) (if applicable):  Vent Information  Vent Mode: NIVPC  Vt Exhaled: 475 mL  Pressure Ordered: 10  Rate Set: 14 bmp  Rate Measured: 35 br/min  Minute Volume: 16.9 Liters  FiO2 : 85 %  Peak Inspiratory Pressure: 31 cmH2O  I:E Ratio: 1:1.85  PEEP/CPAP: 20  I Time/ I Time %: 0.6 s  Mean Airway Pressure: 24 cmH20  Additional Respiratory  Assessments  Pulse: 98  Resp: (!) 35  SpO2: 97 %  Oral Care: Mouth swabbed    ABGs:     Laboratory findings:    Complete Blood Count: Recent Labs      01/26/18 2038   WBC  14.8*   HGB  11.1*   HCT  38.4   PLT  322        Last 3 Blood Glucose:   Recent Labs      01/26/18 2038   GLUCOSE  224*        PT/INR:    Lab Results   Component Value Date    PROTIME 11.0 01/26/2018    INR 1.0 01/26/2018     PTT:    Lab Results   Component Value Date    APTT 30.2 01/27/2018       Comprehensive Metabolic Profile:   Recent Labs      01/26/18 2038   NA  136   K  3.6*   CL  95*   CO2  21   BUN  7   CREATININE  0.54   GLUCOSE  224*   CALCIUM  9.2   PROT  7.6   LABALBU  4.0   BILITOT  0.59   ALKPHOS  121*   AST  23   ALT  15      Magnesium:   Lab Results   Component Value Date    MG 1.6 01/27/2018     Phosphorus: No results found for: PHOS  Ionized Calcium: No results found for: CAION     Urinalysis:     Troponin:   Recent Labs      01/26/18 2038 01/26/18   2313   TROPONINI  0.00  0.21*       Microbiology:    Cultures during this admission:     Blood cultures: 1/26/2018 no growth x 2 days                [] None drawn      [] Negative             []  Positive (Details:  )  Urine Culture:   1/26/2018 negative                [] None drawn      [] Negative             []  Positive (Details:  )  Sputum Culture:               [] None drawn       [] Negative             []  Positive (Details:  )   Endotracheal aspirate: monitoring. Daily weight. Will give Lasix 20 mg IV x 1 this am. Resumed Lisinopril 10 mg PO daily, Norvasc 0 mg PO daily, Hydrochlorothiazide 12.5 mg PO daily in am.   2. Community acquired  pneumonia; azithromycin and rocephin. Follow sputum cultures, Step pneumonia antigen and legionella antigen. Flu PCR as family member has flu. Start on tamiflu. Droplet precautions. 3. Acute respiratory failure; likely secondary to pneumonia and pulmonary edema. Patient has history of asthma too. Continue IV Solu-Medrol 40 mg every 6. Breathing treatments. Continue BiPAP for now. CXR reviewed this am shows increasing Pulmonary edema, will give 20 mg lasix IV today. High risk for intubation. 4. NSTEMI; troponin elevated on heparin drip likely secondary to demand ischemia. Cardiology on board. troponin < 0.03.   5. Smoker; nicotine patch. 6. DVT prophylaxis; on heparin drip. 7. GI prophylaxis: Pepcid 20 mg PO bid        Elvis Williamson M.D. Internal Medicine, Pager: [phone]  Department of Internal Medicine/ Critical care,   [facility])             1/28/2018, 7:46 AM        Attending Physician Statement  I have discussed the care of Sarai Avendano, including pertinent history and exam findings with the resident. I have reviewed the key elements of all parts of the encounter with the resident. I have seen and examined the patient with the resident. I agree with the assessment and plan and status of the problem list as documented. I reviewed the events and I have seen the patient during rounds this morning I seen the labs her previous blood gases chest x-ray today and noninvasive ventilation settings seen.   She remain on noninvasive ventilation and she is on a higher PEEP of 20 and a pressure control of time she is slightly less tachypneic but still tachypneic she is still able to talk with the noninvasive ventilation she had refused to take Norvasc and lisinopril, she is on

## 2018-01-28 NOTE — PROGRESS NOTES
Physical Therapy  DATE: 2018    NAME: Santi Reardon  MRN: [id number]   : 1975    Patient not seen this date for Physical Therapy due to:  [] Blood transfusion in progress  [] Hemodialysis  [x]  Patient Declined   Too SOB  [] Spine Precautions   [] Strict Bedrest  [] Surgery/ Procedure  [] Testing      [] Other        [] PT being discontinued at this time. Patient independent. No further needs. [] PT being discontinued at this time as the patient has been transferred to palliative care. No further needs.     Mary Washington, PT

## 2018-01-28 NOTE — PROGRESS NOTES
Pt is not agreeable to switching from a full face mask to a total face mask at this time. Stating \"not yet\"   Writer informed pt about possibility of breakdown. Pt still did not want to try the total face mask. RN aware. Gel nasal pad in place. Will continue to monitor.

## 2018-01-29 ENCOUNTER — APPOINTMENT (OUTPATIENT)
Dept: GENERAL RADIOLOGY | Age: 43
DRG: 005 | End: 2018-01-29
Payer: MEDICARE

## 2018-01-29 LAB
ADENOVIRUS PCR: NOT DETECTED
ALLEN TEST: POSITIVE
ANION GAP SERPL CALCULATED.3IONS-SCNC: 15 MMOL/L (ref 9–17)
BORDETELLA PERTUSSIS PCR: NOT DETECTED
BUN BLDV-MCNC: 17 MG/DL (ref 6–20)
BUN/CREAT BLD: ABNORMAL (ref 9–20)
CALCIUM SERPL-MCNC: 8.3 MG/DL (ref 8.6–10.4)
CHLAMYDIA PNEUMONIAE BY PCR: NOT DETECTED
CHLORIDE BLD-SCNC: 101 MMOL/L (ref 98–107)
CO2: 23 MMOL/L (ref 20–31)
CORONAVIRUS 229E PCR: NOT DETECTED
CORONAVIRUS HKU1 PCR: NOT DETECTED
CORONAVIRUS NL63 PCR: NOT DETECTED
CORONAVIRUS OC43 PCR: NOT DETECTED
CREAT SERPL-MCNC: 0.96 MG/DL (ref 0.5–0.9)
DIRECT EXAM: NORMAL
EKG ATRIAL RATE: 100 BPM
EKG ATRIAL RATE: 82 BPM
EKG ATRIAL RATE: 90 BPM
EKG P AXIS: 57 DEGREES
EKG P AXIS: 57 DEGREES
EKG P AXIS: 67 DEGREES
EKG P-R INTERVAL: 134 MS
EKG P-R INTERVAL: 148 MS
EKG P-R INTERVAL: 150 MS
EKG Q-T INTERVAL: 372 MS
EKG Q-T INTERVAL: 386 MS
EKG Q-T INTERVAL: 394 MS
EKG QRS DURATION: 80 MS
EKG QRS DURATION: 86 MS
EKG QRS DURATION: 86 MS
EKG QTC CALCULATION (BAZETT): 460 MS
EKG QTC CALCULATION (BAZETT): 472 MS
EKG QTC CALCULATION (BAZETT): 479 MS
EKG R AXIS: 11 DEGREES
EKG R AXIS: 13 DEGREES
EKG R AXIS: 24 DEGREES
EKG T AXIS: 58 DEGREES
EKG T AXIS: 60 DEGREES
EKG T AXIS: 62 DEGREES
EKG VENTRICULAR RATE: 100 BPM
EKG VENTRICULAR RATE: 82 BPM
EKG VENTRICULAR RATE: 90 BPM
FIO2: 90
GFR AFRICAN AMERICAN: >60 ML/MIN
GFR NON-AFRICAN AMERICAN: >60 ML/MIN
GFR SERPL CREATININE-BSD FRML MDRD: ABNORMAL ML/MIN/{1.73_M2}
GFR SERPL CREATININE-BSD FRML MDRD: ABNORMAL ML/MIN/{1.73_M2}
GLUCOSE BLD-MCNC: 178 MG/DL (ref 70–99)
HCT VFR BLD CALC: 33.8 % (ref 36.3–47.1)
HEMOGLOBIN: 9.6 G/DL (ref 11.9–15.1)
HUMAN METAPNEUMOVIRUS PCR: NOT DETECTED
INFLUENZA A BY PCR: NOT DETECTED
INFLUENZA A H1 (2009) PCR: ABNORMAL
INFLUENZA A H1 PCR: ABNORMAL
INFLUENZA A H3 PCR: ABNORMAL
INFLUENZA B BY PCR: NOT DETECTED
Lab: NORMAL
MAGNESIUM: 2.4 MG/DL (ref 1.6–2.6)
MCH RBC QN AUTO: 25.3 PG (ref 25.2–33.5)
MCHC RBC AUTO-ENTMCNC: 28.4 G/DL (ref 28.4–34.8)
MCV RBC AUTO: 89.2 FL (ref 82.6–102.9)
MODE: ABNORMAL
MYCOPLASMA PNEUMONIAE PCR: NOT DETECTED
NEGATIVE BASE EXCESS, ART: ABNORMAL (ref 0–2)
NRBC AUTOMATED: 0 PER 100 WBC
O2 DEVICE/FLOW/%: ABNORMAL
PARAINFLUENZA 1 PCR: NOT DETECTED
PARAINFLUENZA 2 PCR: NOT DETECTED
PARAINFLUENZA 3 PCR: NOT DETECTED
PARAINFLUENZA 4 PCR: NOT DETECTED
PATIENT TEMP: ABNORMAL
PDW BLD-RTO: 15.9 % (ref 11.8–14.4)
PHOSPHORUS: 3.7 MG/DL (ref 2.6–4.5)
PLATELET # BLD: 248 K/UL (ref 138–453)
PMV BLD AUTO: 10.7 FL (ref 8.1–13.5)
POC HCO3: 31.5 MMOL/L (ref 21–28)
POC O2 SATURATION: 91 % (ref 94–98)
POC PCO2 TEMP: ABNORMAL MM HG
POC PCO2: 68.6 MM HG (ref 35–48)
POC PH TEMP: ABNORMAL
POC PH: 7.27 (ref 7.35–7.45)
POC PO2 TEMP: ABNORMAL MM HG
POC PO2: 71.2 MM HG (ref 83–108)
POSITIVE BASE EXCESS, ART: 3 (ref 0–3)
POTASSIUM SERPL-SCNC: 4 MMOL/L (ref 3.7–5.3)
RBC # BLD: 3.79 M/UL (ref 3.95–5.11)
RESP SYNCYTIAL VIRUS PCR: NOT DETECTED
RHINO/ENTEROVIRUS PCR: DETECTED
SAMPLE SITE: ABNORMAL
SODIUM BLD-SCNC: 139 MMOL/L (ref 135–144)
SOURCE: ABNORMAL
SPECIMEN DESCRIPTION: NORMAL
STATUS: NORMAL
TCO2 (CALC), ART: 34 MMOL/L (ref 22–29)
WBC # BLD: 16.5 K/UL (ref 3.5–11.3)

## 2018-01-29 PROCEDURE — 87486 CHLMYD PNEUM DNA AMP PROBE: CPT

## 2018-01-29 PROCEDURE — 85027 COMPLETE CBC AUTOMATED: CPT

## 2018-01-29 PROCEDURE — 36415 COLL VENOUS BLD VENIPUNCTURE: CPT

## 2018-01-29 PROCEDURE — 94770 HC ETCO2 MONITOR DAILY: CPT

## 2018-01-29 PROCEDURE — 82803 BLOOD GASES ANY COMBINATION: CPT

## 2018-01-29 PROCEDURE — 87633 RESP VIRUS 12-25 TARGETS: CPT

## 2018-01-29 PROCEDURE — 6370000000 HC RX 637 (ALT 250 FOR IP): Performed by: STUDENT IN AN ORGANIZED HEALTH CARE EDUCATION/TRAINING PROGRAM

## 2018-01-29 PROCEDURE — 6360000002 HC RX W HCPCS: Performed by: INTERNAL MEDICINE

## 2018-01-29 PROCEDURE — 99233 SBSQ HOSP IP/OBS HIGH 50: CPT | Performed by: INTERNAL MEDICINE

## 2018-01-29 PROCEDURE — 71045 X-RAY EXAM CHEST 1 VIEW: CPT

## 2018-01-29 PROCEDURE — 87070 CULTURE OTHR SPECIMN AEROBIC: CPT

## 2018-01-29 PROCEDURE — 87798 DETECT AGENT NOS DNA AMP: CPT

## 2018-01-29 PROCEDURE — 6360000002 HC RX W HCPCS: Performed by: STUDENT IN AN ORGANIZED HEALTH CARE EDUCATION/TRAINING PROGRAM

## 2018-01-29 PROCEDURE — 6370000000 HC RX 637 (ALT 250 FOR IP): Performed by: HOSPITALIST

## 2018-01-29 PROCEDURE — 93005 ELECTROCARDIOGRAM TRACING: CPT

## 2018-01-29 PROCEDURE — 94640 AIRWAY INHALATION TREATMENT: CPT

## 2018-01-29 PROCEDURE — 2580000003 HC RX 258: Performed by: INTERNAL MEDICINE

## 2018-01-29 PROCEDURE — 86738 MYCOPLASMA ANTIBODY: CPT

## 2018-01-29 PROCEDURE — 94003 VENT MGMT INPAT SUBQ DAY: CPT

## 2018-01-29 PROCEDURE — 80048 BASIC METABOLIC PNL TOTAL CA: CPT

## 2018-01-29 PROCEDURE — 2580000003 HC RX 258: Performed by: STUDENT IN AN ORGANIZED HEALTH CARE EDUCATION/TRAINING PROGRAM

## 2018-01-29 PROCEDURE — 84100 ASSAY OF PHOSPHORUS: CPT

## 2018-01-29 PROCEDURE — 2000000000 HC ICU R&B

## 2018-01-29 PROCEDURE — 36600 WITHDRAWAL OF ARTERIAL BLOOD: CPT

## 2018-01-29 PROCEDURE — 87581 M.PNEUMON DNA AMP PROBE: CPT

## 2018-01-29 PROCEDURE — 83735 ASSAY OF MAGNESIUM: CPT

## 2018-01-29 PROCEDURE — 2500000003 HC RX 250 WO HCPCS: Performed by: EMERGENCY MEDICINE

## 2018-01-29 PROCEDURE — 94762 N-INVAS EAR/PLS OXIMTRY CONT: CPT

## 2018-01-29 PROCEDURE — 87205 SMEAR GRAM STAIN: CPT

## 2018-01-29 PROCEDURE — 99291 CRITICAL CARE FIRST HOUR: CPT | Performed by: INTERNAL MEDICINE

## 2018-01-29 PROCEDURE — 2500000003 HC RX 250 WO HCPCS: Performed by: STUDENT IN AN ORGANIZED HEALTH CARE EDUCATION/TRAINING PROGRAM

## 2018-01-29 RX ORDER — METHYLPREDNISOLONE SODIUM SUCCINATE 125 MG/2ML
40 INJECTION, POWDER, LYOPHILIZED, FOR SOLUTION INTRAMUSCULAR; INTRAVENOUS EVERY 12 HOURS
Status: DISCONTINUED | OUTPATIENT
Start: 2018-01-30 | End: 2018-01-30

## 2018-01-29 RX ORDER — FUROSEMIDE 10 MG/ML
10 INJECTION INTRAMUSCULAR; INTRAVENOUS ONCE
Status: DISCONTINUED | OUTPATIENT
Start: 2018-01-29 | End: 2018-01-29

## 2018-01-29 RX ORDER — ALBUTEROL SULFATE 2.5 MG/3ML
2.5 SOLUTION RESPIRATORY (INHALATION) EVERY 6 HOURS PRN
Status: DISCONTINUED | OUTPATIENT
Start: 2018-01-29 | End: 2018-02-14

## 2018-01-29 RX ORDER — ALBUTEROL SULFATE 2.5 MG/3ML
2.5 SOLUTION RESPIRATORY (INHALATION)
Status: DISCONTINUED | OUTPATIENT
Start: 2018-01-29 | End: 2018-01-29

## 2018-01-29 RX ORDER — LABETALOL HYDROCHLORIDE 5 MG/ML
10 INJECTION, SOLUTION INTRAVENOUS ONCE
Status: COMPLETED | OUTPATIENT
Start: 2018-01-29 | End: 2018-01-29

## 2018-01-29 RX ORDER — HEPARIN SODIUM 5000 [USP'U]/ML
5000 INJECTION, SOLUTION INTRAVENOUS; SUBCUTANEOUS EVERY 8 HOURS SCHEDULED
Status: DISCONTINUED | OUTPATIENT
Start: 2018-01-29 | End: 2018-02-01

## 2018-01-29 RX ORDER — AMLODIPINE BESYLATE 10 MG/1
10 TABLET ORAL DAILY
Status: DISCONTINUED | OUTPATIENT
Start: 2018-01-29 | End: 2018-02-08

## 2018-01-29 RX ORDER — LISINOPRIL 10 MG/1
10 TABLET ORAL DAILY
Status: DISCONTINUED | OUTPATIENT
Start: 2018-01-29 | End: 2018-01-30

## 2018-01-29 RX ORDER — FUROSEMIDE 10 MG/ML
INJECTION INTRAMUSCULAR; INTRAVENOUS
Status: DISPENSED
Start: 2018-01-29 | End: 2018-01-29

## 2018-01-29 RX ORDER — FUROSEMIDE 10 MG/ML
20 INJECTION INTRAMUSCULAR; INTRAVENOUS ONCE
Status: COMPLETED | OUTPATIENT
Start: 2018-01-29 | End: 2018-01-29

## 2018-01-29 RX ORDER — HYDROCHLOROTHIAZIDE 12.5 MG/1
12.5 CAPSULE, GELATIN COATED ORAL EVERY MORNING
Status: DISCONTINUED | OUTPATIENT
Start: 2018-01-29 | End: 2018-01-30

## 2018-01-29 RX ADMIN — PROPOFOL 40 MCG/KG/MIN: 10 INJECTION, EMULSION INTRAVENOUS at 10:12

## 2018-01-29 RX ADMIN — PROPOFOL 40 MCG/KG/MIN: 10 INJECTION, EMULSION INTRAVENOUS at 18:06

## 2018-01-29 RX ADMIN — LABETALOL HYDROCHLORIDE 10 MG: 5 INJECTION, SOLUTION INTRAVENOUS at 10:32

## 2018-01-29 RX ADMIN — AMLODIPINE BESYLATE 10 MG: 10 TABLET ORAL at 13:20

## 2018-01-29 RX ADMIN — PROPOFOL 40 MCG/KG/MIN: 10 INJECTION, EMULSION INTRAVENOUS at 23:54

## 2018-01-29 RX ADMIN — LISINOPRIL 10 MG: 10 TABLET ORAL at 13:20

## 2018-01-29 RX ADMIN — Medication 10 ML: at 09:52

## 2018-01-29 RX ADMIN — METOPROLOL TARTRATE 25 MG: 25 TABLET ORAL at 20:42

## 2018-01-29 RX ADMIN — WATER 2 G: 1 INJECTION INTRAMUSCULAR; INTRAVENOUS; SUBCUTANEOUS at 13:47

## 2018-01-29 RX ADMIN — NICARDIPINE HYDROCHLORIDE 15 MG/HR: 0.1 INJECTION, SOLUTION INTRAVENOUS at 17:10

## 2018-01-29 RX ADMIN — Medication 10 ML: at 20:43

## 2018-01-29 RX ADMIN — OSELTAMIVIR PHOSPHATE 75 MG: 75 CAPSULE ORAL at 09:47

## 2018-01-29 RX ADMIN — HYDROCHLOROTHIAZIDE 12.5 MG: 12.5 CAPSULE ORAL at 13:21

## 2018-01-29 RX ADMIN — HEPARIN SODIUM 5000 UNITS: 5000 INJECTION, SOLUTION INTRAVENOUS; SUBCUTANEOUS at 13:47

## 2018-01-29 RX ADMIN — IPRATROPIUM BROMIDE AND ALBUTEROL SULFATE 1 AMPULE: .5; 3 SOLUTION RESPIRATORY (INHALATION) at 16:10

## 2018-01-29 RX ADMIN — PROPOFOL 50 MCG/KG/MIN: 10 INJECTION, EMULSION INTRAVENOUS at 06:28

## 2018-01-29 RX ADMIN — IPRATROPIUM BROMIDE AND ALBUTEROL SULFATE 1 AMPULE: .5; 3 SOLUTION RESPIRATORY (INHALATION) at 12:14

## 2018-01-29 RX ADMIN — OSELTAMIVIR PHOSPHATE 75 MG: 75 CAPSULE ORAL at 20:43

## 2018-01-29 RX ADMIN — ALBUTEROL SULFATE 2.5 MG: 2.5 SOLUTION RESPIRATORY (INHALATION) at 03:32

## 2018-01-29 RX ADMIN — FENTANYL CITRATE 100 MCG: 50 INJECTION INTRAMUSCULAR; INTRAVENOUS at 13:45

## 2018-01-29 RX ADMIN — PROPOFOL 50 MCG/KG/MIN: 10 INJECTION, EMULSION INTRAVENOUS at 01:58

## 2018-01-29 RX ADMIN — PROPOFOL 50 MCG/KG/MIN: 10 INJECTION, EMULSION INTRAVENOUS at 13:42

## 2018-01-29 RX ADMIN — METHYLPREDNISOLONE SODIUM SUCCINATE 40 MG: 125 INJECTION, POWDER, FOR SOLUTION INTRAMUSCULAR; INTRAVENOUS at 13:26

## 2018-01-29 RX ADMIN — ALBUTEROL SULFATE 2.5 MG: 2.5 SOLUTION RESPIRATORY (INHALATION) at 00:12

## 2018-01-29 RX ADMIN — PROPOFOL 50 MCG/KG/MIN: 10 INJECTION, EMULSION INTRAVENOUS at 04:10

## 2018-01-29 RX ADMIN — PROPOFOL 50 MCG/KG/MIN: 10 INJECTION, EMULSION INTRAVENOUS at 15:26

## 2018-01-29 RX ADMIN — METHYLPREDNISOLONE SODIUM SUCCINATE 40 MG: 125 INJECTION, POWDER, FOR SOLUTION INTRAMUSCULAR; INTRAVENOUS at 09:46

## 2018-01-29 RX ADMIN — METHYLPREDNISOLONE SODIUM SUCCINATE 40 MG: 125 INJECTION, POWDER, FOR SOLUTION INTRAMUSCULAR; INTRAVENOUS at 01:37

## 2018-01-29 RX ADMIN — IPRATROPIUM BROMIDE AND ALBUTEROL SULFATE 1 AMPULE: .5; 3 SOLUTION RESPIRATORY (INHALATION) at 08:43

## 2018-01-29 RX ADMIN — FAMOTIDINE 20 MG: 20 TABLET, FILM COATED ORAL at 09:45

## 2018-01-29 RX ADMIN — ALBUTEROL SULFATE 2.5 MG: 2.5 SOLUTION RESPIRATORY (INHALATION) at 23:16

## 2018-01-29 RX ADMIN — IPRATROPIUM BROMIDE AND ALBUTEROL SULFATE 1 AMPULE: .5; 3 SOLUTION RESPIRATORY (INHALATION) at 19:24

## 2018-01-29 RX ADMIN — Medication 8 MG/HR: at 06:38

## 2018-01-29 RX ADMIN — NICARDIPINE HYDROCHLORIDE 5 MG/HR: 0.1 INJECTION, SOLUTION INTRAVENOUS at 12:37

## 2018-01-29 RX ADMIN — NICARDIPINE HYDROCHLORIDE 15 MG/HR: 0.1 INJECTION, SOLUTION INTRAVENOUS at 15:29

## 2018-01-29 RX ADMIN — FUROSEMIDE 20 MG: 10 INJECTION, SOLUTION INTRAMUSCULAR; INTRAVENOUS at 04:16

## 2018-01-29 RX ADMIN — DESMOPRESSIN ACETATE 40 MG: 0.2 TABLET ORAL at 20:42

## 2018-01-29 RX ADMIN — PROPOFOL 40 MCG/KG/MIN: 10 INJECTION, EMULSION INTRAVENOUS at 20:51

## 2018-01-29 RX ADMIN — FAMOTIDINE 20 MG: 20 TABLET, FILM COATED ORAL at 20:42

## 2018-01-29 RX ADMIN — HEPARIN SODIUM 5000 UNITS: 5000 INJECTION, SOLUTION INTRAVENOUS; SUBCUTANEOUS at 20:42

## 2018-01-29 RX ADMIN — NICARDIPINE HYDROCHLORIDE 15 MG/HR: 0.1 INJECTION, SOLUTION INTRAVENOUS at 13:41

## 2018-01-29 RX ADMIN — METOPROLOL TARTRATE 25 MG: 25 TABLET ORAL at 13:20

## 2018-01-29 ASSESSMENT — PULMONARY FUNCTION TESTS
PIF_VALUE: 30
PIF_VALUE: 35
PIF_VALUE: 33
PIF_VALUE: 32
PIF_VALUE: 33
PIF_VALUE: 29
PIF_VALUE: 32
PIF_VALUE: 34
PIF_VALUE: 37
PIF_VALUE: 26
PIF_VALUE: 26
PIF_VALUE: 27
PIF_VALUE: 28
PIF_VALUE: 30
PIF_VALUE: 35
PIF_VALUE: 36

## 2018-01-29 ASSESSMENT — PAIN SCALES - GENERAL: PAINLEVEL_OUTOF10: 3

## 2018-01-29 NOTE — PROGRESS NOTES
Haemophilus PCR is pending and she is intubated and she is back on Tamiflu. She is on Cardene drip and we will restart on Norvasc and lisinopril and beta blocker and will wean Cardene drip to off. She received Lasix 20 mg. She was initially on propofol drip and Versed drip and this morning now she is on propofol drip it may be difficult to give her interruption off propofol because of low volume done ventilation with requirement of high PEEP and FiO2. The PEEP is wean to 14 with FiO2 of 80% and will try to wean FiO2 to 70%. Continue to monitor intake and output. Start her on tube feeding. Continue with Rocephin and Zithromax was DC'd by infectious disease. Continue Tamiflu and follow up influenza PCR. We will decrease Solu-Medrol dose to twice daily    Discussed with nursing staff and respiratory therapist    Total critical care time caring for this patient with life threatening, unstable organ failure, including direct patient contact, management of life support systems, review of data including imaging and labs, discussions with other team members and physicians at least 28  Min so far today, excluding procedures.         Unknown Patient, MD  1/29/2018 12:25 PM

## 2018-01-29 NOTE — PROGRESS NOTES
[facility]   Progress Note                   Date:   1/29/2018  Patient name: Daniela Werner  Date of admission:  1/26/2018  8:09 PM  MRN:   [id number]  YOB: 1975  PCP: No primary care provider on file. Reason for Admission:  COPD exaerbation & pneumonia    Subjective:     Patient has been intubated in the morning secondary to respiratory distress. Xray chest shows extensive disease bilaterally  Off the cardene gtt since morning. Medications:   Scheduled Meds:   amLODIPine  10 mg Oral Daily    hydrochlorothiazide  12.5 mg Oral QAM    lisinopril  10 mg Oral Daily    metoprolol tartrate  25 mg Oral BID    famotidine  20 mg Oral BID    cefTRIAXone (ROCEPHIN) IV  2 g Intravenous Q24H    nicotine  1 patch Transdermal Daily    sodium chloride flush  10 mL Intravenous 2 times per day    methylPREDNISolone  40 mg Intravenous Q6H    oseltamivir  75 mg Oral BID    atorvastatin  40 mg Oral Nightly    albuterol  2.5 mg Nebulization BID    ipratropium-albuterol  1 ampule Inhalation 4x daily       Continuous Infusions:   dextrose      propofol 40 mcg/kg/min (01/29/18 1012)    midazolam 8 mg/hr (01/29/18 3839)       CBC:   Recent Labs      01/26/18 2038 01/28/18   1000  01/29/18   0714   WBC  14.8*  21.6*  16.5*   HGB  11.1*  9.7*  9.6*   PLT  322  220  248     BMP:    Recent Labs      01/26/18 2038 01/28/18   1000  01/29/18   0714   NA  136  139  139   K  3.6*  3.7  4.0   CL  95*  100  101   CO2  21  23  23   BUN  7  10  17   CREATININE  0.54  0.63  0.96*   GLUCOSE  224*  167*  178*     Hepatic:   Recent Labs      01/26/18 2038   AST  23   ALT  15   BILITOT  0.59   ALKPHOS  121*     Troponin:   Recent Labs      01/26/18 2038 01/26/18   2313   TROPONINI  0.00  0.21*     BNP: No results for input(s): BNP in the last 72 hours. Lipids: No results for input(s): CHOL, HDL in the last 72 hours.     Invalid input(s): LDLCALCU  INR:   Recent Labs      01/26/18 2038 INR  1.0       Objective:   Vitals: BP (!) 174/108   Pulse 102   Temp 99.3 °F (37.4 °C) (Axillary)   Resp (!) 33   Ht 5' (1.524 m)   Wt (!) 338 lb 6.4 oz (153.5 kg)   SpO2 98%   BMI 66.09 kg/m²     General appearance: intubated. Morbidly obese  HEENT: Head: Normocephalic, no lesions, without obvious abnormality  Neck: no JVD  Lungs: wheezing heard  Heart: regular rate and rhythm, S1, S2 normal, no murmur, click, rub or gallop  Abdomen: soft, non-tender; bowel sounds normal  Extremities: chronic lymphedema  Neurologic: intubated. EKG:   Sinus tachycardia    1/27/18  Echocardiogram:  Technically difficult study. All segments not well seen. No comment can be  made regarding specific wall motion. LV chamber dimension is normal. Systolic function appears to be hyperdynamic  with an estimated EF of 65%. Right ventricular dilatation with normal systolic function. No significant valvular regurgitation or stenosis seen. Assessment / Acute Cardiac Problems:   1. Acute respiratory failure  2. OHA  3. Hypertensive  4. Grade 1 DD with preserved EF  5. Wide QRS tachy- ? NSVT  6. Patient Active Problem List:     Pneumonia     NSTEMI (non-ST elevated myocardial infarction) [facility])     Pulmonary edema     Hypertensive emergency     Acute respiratory failure ([facility] [address]     Smoker     Morbid obesity ([facility] [address]     Elevated troponin     Obesity hypoventilation syndrome ([facility])     SOB (shortness of breath)     COPD exacerbation ([facility] [address]    Plan of Treatment:   1. Will start the patient on the lopressor 25 mg BID. Will go up gradually. On lisinopril 10 mg. Can go up on those medications   2. Keep K> 4, Mag, calcium in normal range  3. Will continue the pneumonia treatment per the critical care    Discussed with patient and nursing.      Kenneth Maria MD  Fellow, Cardiovascular Diseases   [address] [facility]     Attending Cardiologist Addendum: I have reviewed and performed the history, physical, subjective, objective, assessment, and plan with the resident/fellow and agree with the note. I performed the history and physical personally. I have made changes to the note above as needed. Wide QRS Tachy just prior to intubation- ? NSVT brought on by hypoxia  - BB  - Stress testing when improved. Thank you for allowing me to participate in the care of this patient, please do not hesitate to call if you have any questions. Rosenda Isaac DO, [address] [facility]  [facility]  [phone]

## 2018-01-29 NOTE — PROGRESS NOTES
[facility] - Daily Progress Note  Today's Date and Time: 1/29/2018, 1:33 PM    Impression :   1. COPD  2. Pulmonary edema   3. Hypertensive emergency  4. Community acquired pneumonia  5. Acute respiratory failure  6. NSTEMI    Recommendations:   · Rocephin 2 gm IV q 24 Hr  · Sputum culture   · D/C Tamiflu: Influenza PCR negative  · Mycoplasma IgM antibody  · Respiratory viral panel, nasopharyngeal     Interval History:   INITIAL HISTORY:  Cristiana Orozco is a 43y.o.-year-old  female who was initially admitted on 1/26/2018. Patient seen at the request of Dr. Jose Pierce.     Patient w/ hx of COPD on home O2 2.5L initially presented to ED for shortness of breath w/ chest pain. For past week patient has had increased cough and production of yellowish sputum w/ progressively increasing SOB along w/ feelings of fever. In ED she was found to have O2 saturation in 30s and 40s, hypertension of 187/81 and a cxr showing potential multifocal pneumonia and pulmonary edema w/ leukocytosis and increased troponins. She was given aspirin and Plavix in ED and admitted.       On admission she was placed on ceftriaxone and azithromycin for potential pneumonia and given solumedrol q8, lasix BID, and cardene for hypertension and pulmonary edema. We were consulted because the influenza PCR results are pending and patient is unable to take tamiflu PO.  Inpatient team is requesting input regarding starting peramivir.       CURRENT EVALUATION: 1/29/2018      Intubated last night due to dropping saturation despite being on BiPAP with tachypnea into 40s   cxr last night showed diffuse bilateral pulmonary edema on lasix IV   BP continued to be high, on cardene drip currently  Attempted sedation holiday last night, able to follow basic commands, but began to cough and become tachypnic again so was placed back on sedation.      CULTURES  1/26/18 blood cx: no growth  1/26/18 rine cx: no

## 2018-01-29 NOTE — FLOWSHEET NOTE
VISIT:  Patient was intubated and unable to respond. INTERVENTION:  Spoke words of comfort and encouragement. Read the 23rd Psalm and prayed for patient. PLAN:  Chaplains are available for on-going spiritual and/or emotional support.        01/29/18 0842   Encounter Summary   Services provided to: Patient   [facility]   Support System Unknown   Continue Visiting (1/29/18)   Complexity of Encounter Low   Length of Encounter 15 minutes   Routine   Type Initial   Spiritual/Pentecostal   Type Spiritual support   Assessment Unable to respond   Intervention Prayer;Sustaining presence/ Ministry of presence;Nurtured hope

## 2018-01-29 NOTE — PLAN OF CARE
BRONCHOSPASM/BRONCHOCONSTRICTION     [x]         IMPROVE AERATION/BREATH SOUNDS  [x]   ADMINISTER BRONCHODILATOR THERAPY AS APPROPRIATE  [x]   ASSESS BREATH SOUNDS  [x]   IMPLEMENT AEROSOL/MDI PROTOCOL  [x]   PATIENT EDUCATION AS NEEDED    NON INVASIVE VENTILATION  PROVIDE OPTIMAL VENTILATION/ACCEPTABLE SP02  IMPLEMENT NON INVASIVE VENTILATION PROTOCOL  ASSESSMENT SKIN INTEGRITY  PATIENT EDUCATION AS NEEDED  BIPAP AS NEEDED

## 2018-01-29 NOTE — PROGRESS NOTES
Patient urine output has dropped over the last 4 hours 35 ml-20 ml-20 ml-20 ml. Chest x-ray shows diffuse bilateral pulmonary edema versus multifocal pneumonia.    'll give Lasix 20 mg IV once. Continue to monitor urine output and blood pressure.     Lily Herrera MD  Internal Medicine Resident  [facility]

## 2018-01-30 ENCOUNTER — APPOINTMENT (OUTPATIENT)
Dept: GENERAL RADIOLOGY | Age: 43
DRG: 005 | End: 2018-01-30
Payer: MEDICARE

## 2018-01-30 LAB
ABSOLUTE EOS #: 0 K/UL (ref 0–0.4)
ABSOLUTE IMMATURE GRANULOCYTE: 0.24 K/UL (ref 0–0.3)
ABSOLUTE LYMPH #: 1.07 K/UL (ref 1–4.8)
ABSOLUTE MONO #: 1.07 K/UL (ref 0.1–0.8)
ALLEN TEST: POSITIVE
ANION GAP SERPL CALCULATED.3IONS-SCNC: 15 MMOL/L (ref 9–17)
BASOPHILS # BLD: 0 % (ref 0–2)
BASOPHILS ABSOLUTE: 0 K/UL (ref 0–0.2)
BUN BLDV-MCNC: 30 MG/DL (ref 6–20)
BUN/CREAT BLD: ABNORMAL (ref 9–20)
CALCIUM IONIZED: 1.04 MMOL/L (ref 1.13–1.33)
CALCIUM SERPL-MCNC: 8.3 MG/DL (ref 8.6–10.4)
CHLORIDE BLD-SCNC: 100 MMOL/L (ref 98–107)
CO2: 27 MMOL/L (ref 20–31)
CREAT SERPL-MCNC: 1.22 MG/DL (ref 0.5–0.9)
DIFFERENTIAL TYPE: ABNORMAL
EOSINOPHILS RELATIVE PERCENT: 0 % (ref 1–4)
FIO2: ABNORMAL
GFR AFRICAN AMERICAN: 59 ML/MIN
GFR NON-AFRICAN AMERICAN: 48 ML/MIN
GFR SERPL CREATININE-BSD FRML MDRD: ABNORMAL ML/MIN/{1.73_M2}
GFR SERPL CREATININE-BSD FRML MDRD: ABNORMAL ML/MIN/{1.73_M2}
GLUCOSE BLD-MCNC: 176 MG/DL (ref 70–99)
HCT VFR BLD CALC: 36.4 % (ref 36.3–47.1)
HEMOGLOBIN: 10.6 G/DL (ref 11.9–15.1)
IMMATURE GRANULOCYTES: 2 %
LYMPHOCYTES # BLD: 9 % (ref 24–44)
MAGNESIUM: 2.7 MG/DL (ref 1.6–2.6)
MCH RBC QN AUTO: 25.4 PG (ref 25.2–33.5)
MCHC RBC AUTO-ENTMCNC: 29.1 G/DL (ref 28.4–34.8)
MCV RBC AUTO: 87.1 FL (ref 82.6–102.9)
MODE: ABNORMAL
MONOCYTES # BLD: 9 % (ref 1–7)
MORPHOLOGY: ABNORMAL
NEGATIVE BASE EXCESS, ART: ABNORMAL (ref 0–2)
NRBC AUTOMATED: 0.3 PER 100 WBC
O2 DEVICE/FLOW/%: ABNORMAL
PATIENT TEMP: ABNORMAL
PDW BLD-RTO: 15.4 % (ref 11.8–14.4)
PLATELET # BLD: ABNORMAL K/UL (ref 138–453)
PLATELET ESTIMATE: ABNORMAL
PLATELET, FLUORESCENCE: NORMAL K/UL (ref 138–453)
PLATELET, IMMATURE FRACTION: NORMAL % (ref 1.1–10.3)
PMV BLD AUTO: ABNORMAL FL (ref 8.1–13.5)
POC HCO3: 31.2 MMOL/L (ref 21–28)
POC O2 SATURATION: 91 % (ref 94–98)
POC PCO2 TEMP: ABNORMAL MM HG
POC PCO2: 68.9 MM HG (ref 35–48)
POC PH TEMP: ABNORMAL
POC PH: 7.26 (ref 7.35–7.45)
POC PO2 TEMP: ABNORMAL MM HG
POC PO2: 73.3 MM HG (ref 83–108)
POSITIVE BASE EXCESS, ART: 3 (ref 0–3)
POTASSIUM SERPL-SCNC: 4 MMOL/L (ref 3.7–5.3)
RBC # BLD: 4.18 M/UL (ref 3.95–5.11)
RBC # BLD: ABNORMAL 10*6/UL
SAMPLE SITE: ABNORMAL
SEG NEUTROPHILS: 80 % (ref 36–66)
SEGMENTED NEUTROPHILS ABSOLUTE COUNT: 9.52 K/UL (ref 1.8–7.7)
SODIUM BLD-SCNC: 142 MMOL/L (ref 135–144)
TCO2 (CALC), ART: 33 MMOL/L (ref 22–29)
WBC # BLD: 11.9 K/UL (ref 3.5–11.3)
WBC # BLD: ABNORMAL 10*3/UL

## 2018-01-30 PROCEDURE — 80048 BASIC METABOLIC PNL TOTAL CA: CPT

## 2018-01-30 PROCEDURE — 6370000000 HC RX 637 (ALT 250 FOR IP): Performed by: STUDENT IN AN ORGANIZED HEALTH CARE EDUCATION/TRAINING PROGRAM

## 2018-01-30 PROCEDURE — 6360000002 HC RX W HCPCS: Performed by: EMERGENCY MEDICINE

## 2018-01-30 PROCEDURE — 6360000002 HC RX W HCPCS: Performed by: INTERNAL MEDICINE

## 2018-01-30 PROCEDURE — 2580000003 HC RX 258: Performed by: INTERNAL MEDICINE

## 2018-01-30 PROCEDURE — 85055 RETICULATED PLATELET ASSAY: CPT

## 2018-01-30 PROCEDURE — 36600 WITHDRAWAL OF ARTERIAL BLOOD: CPT

## 2018-01-30 PROCEDURE — 6370000000 HC RX 637 (ALT 250 FOR IP): Performed by: HOSPITALIST

## 2018-01-30 PROCEDURE — 2000000000 HC ICU R&B

## 2018-01-30 PROCEDURE — 99233 SBSQ HOSP IP/OBS HIGH 50: CPT | Performed by: INTERNAL MEDICINE

## 2018-01-30 PROCEDURE — 36415 COLL VENOUS BLD VENIPUNCTURE: CPT

## 2018-01-30 PROCEDURE — 82803 BLOOD GASES ANY COMBINATION: CPT

## 2018-01-30 PROCEDURE — 85025 COMPLETE CBC W/AUTO DIFF WBC: CPT

## 2018-01-30 PROCEDURE — 97110 THERAPEUTIC EXERCISES: CPT

## 2018-01-30 PROCEDURE — 6360000002 HC RX W HCPCS: Performed by: STUDENT IN AN ORGANIZED HEALTH CARE EDUCATION/TRAINING PROGRAM

## 2018-01-30 PROCEDURE — 97162 PT EVAL MOD COMPLEX 30 MIN: CPT

## 2018-01-30 PROCEDURE — 94640 AIRWAY INHALATION TREATMENT: CPT

## 2018-01-30 PROCEDURE — G8979 MOBILITY GOAL STATUS: HCPCS

## 2018-01-30 PROCEDURE — 71045 X-RAY EXAM CHEST 1 VIEW: CPT

## 2018-01-30 PROCEDURE — 2580000003 HC RX 258: Performed by: STUDENT IN AN ORGANIZED HEALTH CARE EDUCATION/TRAINING PROGRAM

## 2018-01-30 PROCEDURE — 94762 N-INVAS EAR/PLS OXIMTRY CONT: CPT

## 2018-01-30 PROCEDURE — G8978 MOBILITY CURRENT STATUS: HCPCS

## 2018-01-30 PROCEDURE — 82330 ASSAY OF CALCIUM: CPT

## 2018-01-30 PROCEDURE — 2500000003 HC RX 250 WO HCPCS: Performed by: INTERNAL MEDICINE

## 2018-01-30 PROCEDURE — 94003 VENT MGMT INPAT SUBQ DAY: CPT

## 2018-01-30 PROCEDURE — 94770 HC ETCO2 MONITOR DAILY: CPT

## 2018-01-30 PROCEDURE — 2580000003 HC RX 258: Performed by: EMERGENCY MEDICINE

## 2018-01-30 PROCEDURE — 99291 CRITICAL CARE FIRST HOUR: CPT | Performed by: INTERNAL MEDICINE

## 2018-01-30 PROCEDURE — 83735 ASSAY OF MAGNESIUM: CPT

## 2018-01-30 RX ORDER — METOPROLOL TARTRATE 5 MG/5ML
5 INJECTION INTRAVENOUS EVERY 4 HOURS PRN
Status: DISCONTINUED | OUTPATIENT
Start: 2018-01-30 | End: 2018-02-01

## 2018-01-30 RX ORDER — 0.9 % SODIUM CHLORIDE 0.9 %
500 INTRAVENOUS SOLUTION INTRAVENOUS ONCE
Status: COMPLETED | OUTPATIENT
Start: 2018-01-30 | End: 2018-01-30

## 2018-01-30 RX ORDER — METOPROLOL TARTRATE 5 MG/5ML
5 INJECTION INTRAVENOUS EVERY 6 HOURS PRN
Status: DISCONTINUED | OUTPATIENT
Start: 2018-01-30 | End: 2018-02-01

## 2018-01-30 RX ORDER — PREDNISONE 20 MG/1
40 TABLET ORAL DAILY
Status: DISCONTINUED | OUTPATIENT
Start: 2018-01-30 | End: 2018-02-01

## 2018-01-30 RX ORDER — HYDRALAZINE HYDROCHLORIDE 25 MG/1
25 TABLET, FILM COATED ORAL EVERY 8 HOURS SCHEDULED
Status: DISCONTINUED | OUTPATIENT
Start: 2018-01-30 | End: 2018-01-31

## 2018-01-30 RX ORDER — HYDROCHLOROTHIAZIDE 12.5 MG/1
25 CAPSULE, GELATIN COATED ORAL EVERY MORNING
Status: DISCONTINUED | OUTPATIENT
Start: 2018-01-31 | End: 2018-01-30

## 2018-01-30 RX ORDER — METOPROLOL TARTRATE 50 MG/1
50 TABLET, FILM COATED ORAL 2 TIMES DAILY
Status: DISCONTINUED | OUTPATIENT
Start: 2018-01-30 | End: 2018-02-01

## 2018-01-30 RX ORDER — SODIUM CHLORIDE 9 MG/ML
INJECTION, SOLUTION INTRAVENOUS CONTINUOUS
Status: DISCONTINUED | OUTPATIENT
Start: 2018-01-30 | End: 2018-02-05

## 2018-01-30 RX ADMIN — IPRATROPIUM BROMIDE AND ALBUTEROL SULFATE 1 AMPULE: .5; 3 SOLUTION RESPIRATORY (INHALATION) at 08:05

## 2018-01-30 RX ADMIN — SODIUM CHLORIDE: 9 INJECTION, SOLUTION INTRAVENOUS at 13:19

## 2018-01-30 RX ADMIN — IPRATROPIUM BROMIDE AND ALBUTEROL SULFATE 1 AMPULE: .5; 3 SOLUTION RESPIRATORY (INHALATION) at 12:34

## 2018-01-30 RX ADMIN — HYDRALAZINE HYDROCHLORIDE 25 MG: 25 TABLET, FILM COATED ORAL at 21:49

## 2018-01-30 RX ADMIN — FENTANYL CITRATE 100 MCG: 50 INJECTION INTRAMUSCULAR; INTRAVENOUS at 00:07

## 2018-01-30 RX ADMIN — FENTANYL CITRATE 100 MCG: 50 INJECTION INTRAMUSCULAR; INTRAVENOUS at 18:10

## 2018-01-30 RX ADMIN — FAMOTIDINE 20 MG: 20 TABLET, FILM COATED ORAL at 20:26

## 2018-01-30 RX ADMIN — METHYLPREDNISOLONE SODIUM SUCCINATE 40 MG: 125 INJECTION, POWDER, FOR SOLUTION INTRAMUSCULAR; INTRAVENOUS at 01:51

## 2018-01-30 RX ADMIN — HEPARIN SODIUM 5000 UNITS: 5000 INJECTION, SOLUTION INTRAVENOUS; SUBCUTANEOUS at 06:59

## 2018-01-30 RX ADMIN — PROPOFOL 40 MCG/KG/MIN: 10 INJECTION, EMULSION INTRAVENOUS at 05:08

## 2018-01-30 RX ADMIN — WATER 2 G: 1 INJECTION INTRAMUSCULAR; INTRAVENOUS; SUBCUTANEOUS at 13:24

## 2018-01-30 RX ADMIN — LISINOPRIL 10 MG: 10 TABLET ORAL at 08:29

## 2018-01-30 RX ADMIN — HEPARIN SODIUM 5000 UNITS: 5000 INJECTION, SOLUTION INTRAVENOUS; SUBCUTANEOUS at 21:49

## 2018-01-30 RX ADMIN — SODIUM CHLORIDE 500 ML: 9 INJECTION, SOLUTION INTRAVENOUS at 13:18

## 2018-01-30 RX ADMIN — METOPROLOL TARTRATE 25 MG: 25 TABLET ORAL at 08:29

## 2018-01-30 RX ADMIN — DESMOPRESSIN ACETATE 40 MG: 0.2 TABLET ORAL at 20:26

## 2018-01-30 RX ADMIN — PROPOFOL 40 MCG/KG/MIN: 10 INJECTION, EMULSION INTRAVENOUS at 18:13

## 2018-01-30 RX ADMIN — CALCIUM GLUCONATE 1 G: 98 INJECTION, SOLUTION INTRAVENOUS at 11:53

## 2018-01-30 RX ADMIN — PROPOFOL 40 MCG/KG/MIN: 10 INJECTION, EMULSION INTRAVENOUS at 03:00

## 2018-01-30 RX ADMIN — FENTANYL CITRATE 100 MCG: 50 INJECTION INTRAMUSCULAR; INTRAVENOUS at 02:45

## 2018-01-30 RX ADMIN — CALCIUM GLUCONATE 1 G: 98 INJECTION, SOLUTION INTRAVENOUS at 16:40

## 2018-01-30 RX ADMIN — FENTANYL CITRATE 100 MCG: 50 INJECTION INTRAMUSCULAR; INTRAVENOUS at 15:51

## 2018-01-30 RX ADMIN — FENTANYL CITRATE 100 MCG: 50 INJECTION INTRAMUSCULAR; INTRAVENOUS at 10:57

## 2018-01-30 RX ADMIN — PROPOFOL 40 MCG/KG/MIN: 10 INJECTION, EMULSION INTRAVENOUS at 13:23

## 2018-01-30 RX ADMIN — IPRATROPIUM BROMIDE AND ALBUTEROL SULFATE 1 AMPULE: .5; 3 SOLUTION RESPIRATORY (INHALATION) at 15:37

## 2018-01-30 RX ADMIN — HYDRALAZINE HYDROCHLORIDE 25 MG: 25 TABLET, FILM COATED ORAL at 13:24

## 2018-01-30 RX ADMIN — METOPROLOL TARTRATE 50 MG: 50 TABLET, FILM COATED ORAL at 20:24

## 2018-01-30 RX ADMIN — IPRATROPIUM BROMIDE AND ALBUTEROL SULFATE 1 AMPULE: .5; 3 SOLUTION RESPIRATORY (INHALATION) at 19:46

## 2018-01-30 RX ADMIN — AMLODIPINE BESYLATE 10 MG: 10 TABLET ORAL at 08:29

## 2018-01-30 RX ADMIN — FENTANYL CITRATE 100 MCG: 50 INJECTION INTRAMUSCULAR; INTRAVENOUS at 20:30

## 2018-01-30 RX ADMIN — PROPOFOL 40 MCG/KG/MIN: 10 INJECTION, EMULSION INTRAVENOUS at 15:51

## 2018-01-30 RX ADMIN — PROPOFOL 40 MCG/KG/MIN: 10 INJECTION, EMULSION INTRAVENOUS at 21:17

## 2018-01-30 RX ADMIN — PROPOFOL 40 MCG/KG/MIN: 10 INJECTION, EMULSION INTRAVENOUS at 10:57

## 2018-01-30 RX ADMIN — HYDROCHLOROTHIAZIDE 12.5 MG: 12.5 CAPSULE ORAL at 08:29

## 2018-01-30 RX ADMIN — PROPOFOL 40 MCG/KG/MIN: 10 INJECTION, EMULSION INTRAVENOUS at 08:08

## 2018-01-30 RX ADMIN — PREDNISONE 40 MG: 20 TABLET ORAL at 10:59

## 2018-01-30 RX ADMIN — HEPARIN SODIUM 5000 UNITS: 5000 INJECTION, SOLUTION INTRAVENOUS; SUBCUTANEOUS at 13:24

## 2018-01-30 RX ADMIN — ALBUTEROL SULFATE 2.5 MG: 2.5 SOLUTION RESPIRATORY (INHALATION) at 03:25

## 2018-01-30 RX ADMIN — FAMOTIDINE 20 MG: 20 TABLET, FILM COATED ORAL at 08:29

## 2018-01-30 ASSESSMENT — PAIN SCALES - GENERAL
PAINLEVEL_OUTOF10: 6
PAINLEVEL_OUTOF10: 3

## 2018-01-30 ASSESSMENT — PULMONARY FUNCTION TESTS
PIF_VALUE: 32
PIF_VALUE: 31
PIF_VALUE: 33
PIF_VALUE: 27
PIF_VALUE: 32

## 2018-01-30 NOTE — PROGRESS NOTES
[address] [place] [address]     I performed a history and physical examination of the patient and discussed management with the resident. I reviewed the residents note and agree with the documented findings and plan of care. Any areas of disagreement are noted on the chart. I was personally present for the key portions of any procedures. I have documented in the chart those procedures where I was not present during the key portions. I have personally evaluated this patient and have completed at least one if not all key elements of the E/M (history, physical exam, and MDM). Additional findings are as noted.     Leticia Salomon MD

## 2018-01-30 NOTE — PROGRESS NOTES
[address]  Occupational Therapy Not Seen Note    Patient not available for Occupational Therapy due to:    [] Testing:    [] Hemodialysis    [] Blood Transfusion in Progress    []Refusal by Patient:    [] Surgery/Procedure:    [] Strict Bedrest    [x] intubated/sedation-no weaning yet. Minimal response    [] Spine Precautions     [] Pt being transferred to palliative care at this time. Spoke with pt/family and OT services to be defered. [] Pt independent with functional mobility and functional tasks.  Pt with no OT acute care needs at this time, will defer OT eval.    [] Other    Next Scheduled Treatment: recheck feb 1-2     Signature:Isabel Anne MOT, OTR/L, CLT

## 2018-01-30 NOTE — PROGRESS NOTES
Critical Care Team - Daily Progress Note      Date and time: 1/30/2018 5:31 AM  Patient's name:  Roxanne Pereira  Medical Record Number: [id number]  Patient's account/billing number: [de-identified]  Patient's YOB: 1975  Age: 43 y.o. Date of Admission: 1/26/2018  8:09 PM  Length of stay during current admission: 4      Primary Care Physician: No primary care provider on file. ICU Attending Physician: Dr. Joaquin Saunders MD    Code Status: Full Code    Reason for ICU admission: SOB, Pneumonia, Hypertensive Emergency. SUBJECTIVE:     OVERNIGHT EVENTS:         Patient intubated and sedated. BP stable, Afebrile,  ml/hr. Minimally responsive , pin point pupils slugishly reactive. On propofol gets tachypnic on versed.    Vent settings down to Fio2 70 %, RR 32/min, , PEEP 12  No weaning yet, Secretions from ET tube minimal  ABG PH 7.26/ PCO2 68.9/ PO2 73.3/ HCO3 31.2      AWAKE & FOLLOWING COMMANDS:  [x] No   [] Yes    CURRENT VENTILATION STATUS:     [x] Ventilator  [] BIPAP 85% [] Nasal Cannula [] Room Air      IF INTUBATED, ET TUBE MARKING AT LOWER LIP:       cms    SECRETIONS Amount:  [] Small [] Moderate  [] Large  [x] None  Color:     [] White [] Colored  [] Bloody    SEDATION:  RAAS Score:  [] Propofol gtt  [x] Versed gtt  [] Ativan gtt   [] No Sedation    PARALYZED:  [x] No    [] Yes    DIARRHEA:                [x] No                [] Yes  (C. Difficile status: [] positive                                                                                                                       [] negative                                                                                                                     [] pending)    VASOPRESSORS:  [x] No    [] Yes    If yes -   [] Levophed       [] Dopamine     [] Vasopressin       [] Dobutamine  [] Phenylephrine         [] Epinephrine    CENTRAL LINES:     [x] No   [] Yes   (Date of Insertion:   )           If yes -     [] Right IJ Pressure:  (manuel)  Static Compliance:  (manuel)  Dynamic Compliance:  (manuel)  Total PEEP:  (manuel)  Auto PEEP: 0 cmH20  Additional Respiratory  Assessments  Pulse: 79  Resp: 24  SpO2: 99 %  End Tidal CO2: 69 (%)  Position: Semi-Blakely's  Humidification Temp: 36.9  Circuit Condensation: Drained  Oral Care Completed?: Yes  Oral Care: Mouth swabbed, Mouth suctioned  Subglottic Suction Done?: Yes    ABGs:     Laboratory findings:    Complete Blood Count:   Recent Labs      01/28/18   1000  01/29/18   0714   WBC  21.6*  16.5*   HGB  9.7*  9.6*   HCT  34.7*  33.8*   PLT  220  248        Last 3 Blood Glucose:   Recent Labs      01/28/18   1000  01/29/18   0714   GLUCOSE  167*  178*        PT/INR:    Lab Results   Component Value Date    PROTIME 11.0 01/26/2018    INR 1.0 01/26/2018     PTT:    Lab Results   Component Value Date    APTT 30.2 01/27/2018       Comprehensive Metabolic Profile:   Recent Labs      01/28/18   1000  01/29/18   0714   NA  139  139   K  3.7  4.0   CL  100  101   CO2  23  23   BUN  10  17   CREATININE  0.63  0.96*   GLUCOSE  167*  178*   CALCIUM  8.7  8.3*      Magnesium:   Lab Results   Component Value Date    MG 2.4 01/29/2018     Phosphorus:   Lab Results   Component Value Date    PHOS 3.7 01/29/2018     Ionized Calcium: No results found for: CAION     Urinalysis:     Troponin:   No results for input(s): TROPONINI in the last 72 hours. Microbiology:    Cultures during this admission:     Blood cultures: 1/26/2018 no growth x 2 days                [] None drawn      [] Negative             []  Positive (Details:  )  Urine Culture:   1/26/2018 negative                [] None drawn      [] Negative             []  Positive (Details:  )  Sputum Culture:               [] None drawn       [] Negative             []  Positive (Details:  )   Endotracheal aspirate:     [] None drawn       [] Negative             []  Positive (Details:  )     Other pertinent Labs:    Influenza PCR pending    Radiology/Imaging: any creatinine and then will give her Lasix today otherwise we'll give her fluid. We will follow up CBC count also for WBC, her flu PCR was negative but she had final no/enterovirus PCR detected  Will monitor intake and output and renal function  She is off Cardene drip this morning and blood pressure is better controlled on hydralazine metoprolol and Norvasc and will adjust the dose of hydralazine  Continue tube feeding, follow-up infectious disease  Will continue with low tidal volume ventilation and high PEEP and high respiratory rate. Discussed with nursing staff and respiratory therapist    Total critical care time caring for this patient with life threatening, unstable organ failure, including direct patient contact, management of life support systems, review of data including imaging and labs, discussions with other team members and physicians at least 28  Min so far today, excluding procedures.         Dalton Briggs MD  1/30/2018 8:44 AM

## 2018-01-30 NOTE — PROGRESS NOTES
rehabilitation services?: Yes  Response To Previous Treatment: Not applicable  Family / Caregiver Present: No  Follows Commands: Impaired  Pain Screening  Patient Currently in Pain: Unable to Assess         Orientation  Orientation  Overall Orientation Status: Impaired    Social/Functional History  Social/Functional History  Lives With: Son  Type of Home: House  Bathroom Accessibility: Accessible  Home Equipment: Rolling walker, Oxygen  Receives Help From: Family  ADL Assistance: Needs assistance  Homemaking Assistance: Needs assistance  Ambulation Assistance: Needs assistance  Transfer Assistance: Needs assistance  Active : No  Patient's  Info: uber or lift  Additional Comments:  (information taken from [facility]--pt unable to answer questions, no family present)  Objective          PROM RLE (degrees)  RLE PROM: WFL  PROM LLE (degrees)  LLE PROM: WFL  PROM RUE (degrees)  RUE PROM: WFL  PROM LUE (degrees)  LUE PROM: WFL  Strength   No active movement noted--pt sedated; per RN, she moves everything when on sedation holiday  Tone RLE  RLE Tone: Normotonic  Tone LLE  LLE Tone: Normotonic  Sensation  Overall Sensation Status:  (RAMONE)  Bed mobility  Rolling to Left: Unable to assess  Rolling to Right: Unable to assess  Supine to Sit: Unable to assess  Sit to Supine: Unable to assess  Scooting: Unable to assess  Transfers  Sit to Stand: Unable to assess  Stand to sit: Unable to assess  Bed to Chair: Unable to assess  Stand Pivot Transfers: Unable to assess  Ambulation  Ambulation?: No  Stairs/Curb  Stairs?: No        Other exercises  Other exercises 1: PROM x4, 10 reps, all planes     Assessment    Mobility not assessed d/t pt sedation, oral intubation--unable to assist with mobility  Body structures, Functions, Activity limitations: Decreased functional mobility ; Decreased strength;Decreased safe awareness;Decreased cognition;Decreased endurance  Prognosis: Fair  Decision Making: Medium Complexity  Patient Education: PT POC  Barriers to Learning: pt sedated, unresponsive  REQUIRES PT FOLLOW UP: Yes  Activity Tolerance  Activity Tolerance: Patient Tolerated treatment well  PT Equipment Recommendations  Equipment Needed:  (TBD)     Plan   Plan  Times per week: 2-3 visits weekly  Times per day: Daily  Current Treatment Recommendations: Strengthening, ROM, Functional Mobility Training  Safety Devices  Type of devices: Call light within reach, Left in bed, Nurse notified  Restraints  Initially in place: Yes  Restraints:  (LUE wrist restraint)    G-Code  PT G-Codes  Functional Assessment Tool Used: [place]  Score: 0/28  Functional Limitation: Mobility: Walking and moving around  Mobility: Walking and Moving Around Current Status (): 100 percent impaired, limited or restricted  Mobility: Walking and Moving Around Goal Status ():  At least 40 percent but less than 60 percent impaired, limited or restricted    Goals  Short term goals  Time Frame for Short term goals: 12 visits  Short term goal 1: prevent contractures x 4  Short term goal 2: facilitate active movement x 4 when pt off sedation  Short term goal 3: mobilize pt when off sedation and set goals  Patient Goals   Patient goals : pt unable to state goal--sedated and orally intubated       Therapy Time   Individual Concurrent Group Co-treatment   Time In 1019         Time Out 1058         Minutes 39                 Marielena Matos, PT

## 2018-01-31 ENCOUNTER — APPOINTMENT (OUTPATIENT)
Dept: GENERAL RADIOLOGY | Age: 43
DRG: 005 | End: 2018-01-31
Payer: MEDICARE

## 2018-01-31 LAB
ALLEN TEST: NEGATIVE
ANION GAP SERPL CALCULATED.3IONS-SCNC: 13 MMOL/L (ref 9–17)
BUN BLDV-MCNC: 34 MG/DL (ref 6–20)
BUN/CREAT BLD: ABNORMAL (ref 9–20)
CALCIUM SERPL-MCNC: 8.3 MG/DL (ref 8.6–10.4)
CHLORIDE BLD-SCNC: 102 MMOL/L (ref 98–107)
CO2: 28 MMOL/L (ref 20–31)
CREAT SERPL-MCNC: 1.12 MG/DL (ref 0.5–0.9)
CULTURE: NORMAL
CULTURE: NORMAL
DIRECT EXAM: NORMAL
FIO2: 50
GFR AFRICAN AMERICAN: >60 ML/MIN
GFR NON-AFRICAN AMERICAN: 53 ML/MIN
GFR SERPL CREATININE-BSD FRML MDRD: ABNORMAL ML/MIN/{1.73_M2}
GFR SERPL CREATININE-BSD FRML MDRD: ABNORMAL ML/MIN/{1.73_M2}
GLUCOSE BLD-MCNC: 135 MG/DL (ref 70–99)
HCO3 VENOUS: 32.7 MMOL/L (ref 22–29)
Lab: NORMAL
MODE: ABNORMAL
MYCOPLASMA PNEUMONIAE IGM: 0.42
NEGATIVE BASE EXCESS, VEN: ABNORMAL (ref 0–2)
O2 DEVICE/FLOW/%: ABNORMAL
O2 SAT, VEN: 88 % (ref 60–85)
PATIENT TEMP: ABNORMAL
PCO2, VEN: 61.4 MM HG (ref 41–51)
PH VENOUS: 7.33 (ref 7.32–7.43)
PO2, VEN: 60.6 MM HG (ref 30–50)
POC PCO2 TEMP: ABNORMAL MM HG
POC PH TEMP: ABNORMAL
POC PO2 TEMP: ABNORMAL MM HG
POSITIVE BASE EXCESS, VEN: 6 (ref 0–3)
POTASSIUM SERPL-SCNC: 3.8 MMOL/L (ref 3.7–5.3)
SAMPLE SITE: ABNORMAL
SODIUM BLD-SCNC: 143 MMOL/L (ref 135–144)
SPECIMEN DESCRIPTION: NORMAL
STATUS: NORMAL
TOTAL CO2, VENOUS: 35 MMOL/L (ref 23–30)

## 2018-01-31 PROCEDURE — 6370000000 HC RX 637 (ALT 250 FOR IP): Performed by: INTERNAL MEDICINE

## 2018-01-31 PROCEDURE — 6370000000 HC RX 637 (ALT 250 FOR IP): Performed by: HOSPITALIST

## 2018-01-31 PROCEDURE — 2500000003 HC RX 250 WO HCPCS: Performed by: STUDENT IN AN ORGANIZED HEALTH CARE EDUCATION/TRAINING PROGRAM

## 2018-01-31 PROCEDURE — 94762 N-INVAS EAR/PLS OXIMTRY CONT: CPT

## 2018-01-31 PROCEDURE — 6360000002 HC RX W HCPCS: Performed by: INTERNAL MEDICINE

## 2018-01-31 PROCEDURE — 36415 COLL VENOUS BLD VENIPUNCTURE: CPT

## 2018-01-31 PROCEDURE — 94003 VENT MGMT INPAT SUBQ DAY: CPT

## 2018-01-31 PROCEDURE — 6370000000 HC RX 637 (ALT 250 FOR IP): Performed by: STUDENT IN AN ORGANIZED HEALTH CARE EDUCATION/TRAINING PROGRAM

## 2018-01-31 PROCEDURE — 71045 X-RAY EXAM CHEST 1 VIEW: CPT

## 2018-01-31 PROCEDURE — 94770 HC ETCO2 MONITOR DAILY: CPT

## 2018-01-31 PROCEDURE — 6360000002 HC RX W HCPCS: Performed by: STUDENT IN AN ORGANIZED HEALTH CARE EDUCATION/TRAINING PROGRAM

## 2018-01-31 PROCEDURE — 80048 BASIC METABOLIC PNL TOTAL CA: CPT

## 2018-01-31 PROCEDURE — 2500000003 HC RX 250 WO HCPCS: Performed by: INTERNAL MEDICINE

## 2018-01-31 PROCEDURE — 82803 BLOOD GASES ANY COMBINATION: CPT

## 2018-01-31 PROCEDURE — 94640 AIRWAY INHALATION TREATMENT: CPT

## 2018-01-31 PROCEDURE — 36600 WITHDRAWAL OF ARTERIAL BLOOD: CPT

## 2018-01-31 PROCEDURE — 2000000000 HC ICU R&B

## 2018-01-31 PROCEDURE — 97110 THERAPEUTIC EXERCISES: CPT

## 2018-01-31 PROCEDURE — 2580000003 HC RX 258: Performed by: INTERNAL MEDICINE

## 2018-01-31 PROCEDURE — 99233 SBSQ HOSP IP/OBS HIGH 50: CPT | Performed by: INTERNAL MEDICINE

## 2018-01-31 PROCEDURE — 99291 CRITICAL CARE FIRST HOUR: CPT | Performed by: INTERNAL MEDICINE

## 2018-01-31 RX ORDER — HYDRALAZINE HYDROCHLORIDE 50 MG/1
50 TABLET, FILM COATED ORAL EVERY 8 HOURS SCHEDULED
Status: DISCONTINUED | OUTPATIENT
Start: 2018-01-31 | End: 2018-02-02

## 2018-01-31 RX ADMIN — METOPROLOL TARTRATE 50 MG: 50 TABLET, FILM COATED ORAL at 20:03

## 2018-01-31 RX ADMIN — IPRATROPIUM BROMIDE AND ALBUTEROL SULFATE 1 AMPULE: .5; 3 SOLUTION RESPIRATORY (INHALATION) at 15:10

## 2018-01-31 RX ADMIN — FENTANYL CITRATE 100 MCG: 50 INJECTION INTRAMUSCULAR; INTRAVENOUS at 18:09

## 2018-01-31 RX ADMIN — HYDRALAZINE HYDROCHLORIDE 50 MG: 50 TABLET, FILM COATED ORAL at 13:49

## 2018-01-31 RX ADMIN — PROPOFOL 50 MCG/KG/MIN: 10 INJECTION, EMULSION INTRAVENOUS at 21:14

## 2018-01-31 RX ADMIN — PROPOFOL 40 MCG/KG/MIN: 10 INJECTION, EMULSION INTRAVENOUS at 05:35

## 2018-01-31 RX ADMIN — SODIUM CHLORIDE: 9 INJECTION, SOLUTION INTRAVENOUS at 16:11

## 2018-01-31 RX ADMIN — HYDRALAZINE HYDROCHLORIDE 25 MG: 25 TABLET, FILM COATED ORAL at 05:30

## 2018-01-31 RX ADMIN — PROPOFOL 40 MCG/KG/MIN: 10 INJECTION, EMULSION INTRAVENOUS at 01:12

## 2018-01-31 RX ADMIN — FENTANYL CITRATE 100 MCG: 50 INJECTION INTRAMUSCULAR; INTRAVENOUS at 13:55

## 2018-01-31 RX ADMIN — HYDRALAZINE HYDROCHLORIDE 50 MG: 50 TABLET, FILM COATED ORAL at 21:53

## 2018-01-31 RX ADMIN — FENTANYL CITRATE 100 MCG: 50 INJECTION INTRAMUSCULAR; INTRAVENOUS at 01:12

## 2018-01-31 RX ADMIN — FENTANYL CITRATE 100 MCG: 50 INJECTION INTRAMUSCULAR; INTRAVENOUS at 20:16

## 2018-01-31 RX ADMIN — METOPROLOL TARTRATE 5 MG: 5 INJECTION, SOLUTION INTRAVENOUS at 02:05

## 2018-01-31 RX ADMIN — IPRATROPIUM BROMIDE AND ALBUTEROL SULFATE 1 AMPULE: .5; 3 SOLUTION RESPIRATORY (INHALATION) at 12:09

## 2018-01-31 RX ADMIN — ALBUTEROL SULFATE 2.5 MG: 2.5 SOLUTION RESPIRATORY (INHALATION) at 00:38

## 2018-01-31 RX ADMIN — PROPOFOL 40 MCG/KG/MIN: 10 INJECTION, EMULSION INTRAVENOUS at 11:15

## 2018-01-31 RX ADMIN — ALBUTEROL SULFATE 2.5 MG: 2.5 SOLUTION RESPIRATORY (INHALATION) at 10:40

## 2018-01-31 RX ADMIN — PREDNISONE 40 MG: 20 TABLET ORAL at 09:37

## 2018-01-31 RX ADMIN — METOPROLOL TARTRATE 50 MG: 50 TABLET, FILM COATED ORAL at 09:37

## 2018-01-31 RX ADMIN — HEPARIN SODIUM 5000 UNITS: 5000 INJECTION, SOLUTION INTRAVENOUS; SUBCUTANEOUS at 21:53

## 2018-01-31 RX ADMIN — FENTANYL CITRATE 100 MCG: 50 INJECTION INTRAMUSCULAR; INTRAVENOUS at 06:40

## 2018-01-31 RX ADMIN — PROPOFOL 40 MCG/KG/MIN: 10 INJECTION, EMULSION INTRAVENOUS at 08:00

## 2018-01-31 RX ADMIN — FENTANYL CITRATE 100 MCG: 50 INJECTION INTRAMUSCULAR; INTRAVENOUS at 08:10

## 2018-01-31 RX ADMIN — FAMOTIDINE 20 MG: 20 TABLET, FILM COATED ORAL at 09:36

## 2018-01-31 RX ADMIN — PROPOFOL 40 MCG/KG/MIN: 10 INJECTION, EMULSION INTRAVENOUS at 14:07

## 2018-01-31 RX ADMIN — DESMOPRESSIN ACETATE 40 MG: 0.2 TABLET ORAL at 20:03

## 2018-01-31 RX ADMIN — FENTANYL CITRATE 100 MCG: 50 INJECTION INTRAMUSCULAR; INTRAVENOUS at 16:21

## 2018-01-31 RX ADMIN — HEPARIN SODIUM 5000 UNITS: 5000 INJECTION, SOLUTION INTRAVENOUS; SUBCUTANEOUS at 05:30

## 2018-01-31 RX ADMIN — IPRATROPIUM BROMIDE AND ALBUTEROL SULFATE 1 AMPULE: .5; 3 SOLUTION RESPIRATORY (INHALATION) at 19:34

## 2018-01-31 RX ADMIN — ALBUTEROL SULFATE 2.5 MG: 2.5 SOLUTION RESPIRATORY (INHALATION) at 03:52

## 2018-01-31 RX ADMIN — FENTANYL CITRATE 100 MCG: 50 INJECTION INTRAMUSCULAR; INTRAVENOUS at 15:06

## 2018-01-31 RX ADMIN — WATER 2 G: 1 INJECTION INTRAMUSCULAR; INTRAVENOUS; SUBCUTANEOUS at 13:50

## 2018-01-31 RX ADMIN — IPRATROPIUM BROMIDE AND ALBUTEROL SULFATE 1 AMPULE: .5; 3 SOLUTION RESPIRATORY (INHALATION) at 07:46

## 2018-01-31 RX ADMIN — PROPOFOL 40 MCG/KG/MIN: 10 INJECTION, EMULSION INTRAVENOUS at 02:46

## 2018-01-31 RX ADMIN — FENTANYL CITRATE 100 MCG: 50 INJECTION INTRAMUSCULAR; INTRAVENOUS at 09:34

## 2018-01-31 RX ADMIN — HEPARIN SODIUM 5000 UNITS: 5000 INJECTION, SOLUTION INTRAVENOUS; SUBCUTANEOUS at 13:49

## 2018-01-31 RX ADMIN — FENTANYL CITRATE 100 MCG: 50 INJECTION INTRAMUSCULAR; INTRAVENOUS at 03:34

## 2018-01-31 RX ADMIN — AMLODIPINE BESYLATE 10 MG: 10 TABLET ORAL at 09:37

## 2018-01-31 RX ADMIN — PROPOFOL 40 MCG/KG/MIN: 10 INJECTION, EMULSION INTRAVENOUS at 16:11

## 2018-01-31 RX ADMIN — PROPOFOL 40 MCG/KG/MIN: 10 INJECTION, EMULSION INTRAVENOUS at 19:42

## 2018-01-31 RX ADMIN — FENTANYL CITRATE 100 MCG: 50 INJECTION INTRAMUSCULAR; INTRAVENOUS at 11:49

## 2018-01-31 RX ADMIN — FENTANYL CITRATE 100 MCG: 50 INJECTION INTRAMUSCULAR; INTRAVENOUS at 19:15

## 2018-01-31 ASSESSMENT — PAIN SCALES - GENERAL
PAINLEVEL_OUTOF10: 7
PAINLEVEL_OUTOF10: 7

## 2018-01-31 ASSESSMENT — PULMONARY FUNCTION TESTS
PIF_VALUE: 24
PIF_VALUE: 34
PIF_VALUE: 33
PIF_VALUE: 30
PIF_VALUE: 31
PIF_VALUE: 34
PIF_VALUE: 31
PIF_VALUE: 27
PIF_VALUE: 35
PIF_VALUE: 30
PIF_VALUE: 33
PIF_VALUE: 31

## 2018-01-31 NOTE — PROGRESS NOTES
0949 peep increased to 14 and FIO2 of 70% due to decreased pulse ox into the 80s. Pulse ox increased to 92%. Corona and hyde changed. Good equal bilateral breath sounds noted. Patient is wheezing so prn albuterol tx given in line. Oral care complete.

## 2018-01-31 NOTE — PROGRESS NOTES
Attending Physician Statement  I have discussed the care of Jameel Gee, including pertinent history and exam findings with the resident. I have reviewed the key elements of all parts of the encounter with the resident. I have seen and examined the patient with the resident. I agree with the assessment and plan and status of the problem list as documented. Please see full note by critical care resident  I have seen the patient during my critical care rounds this morning, I have seen multiple blood gases overnight overnight events seen in ventilator settings seen and discussed with respiratory therapist in detail and also with nursing staff. She is afebrile and hypertensive heart-he can't is 11.9 and it is improved, her creatinine is better today 1.12 and urine output is around 2 L in the last 24 hours, she is currently on propofol drip she was not able to wean to assess the mentation from propofol as she is becoming very tachypneic hypoxic and tachycardic off propofol. Overnight she was more hypoxic and had to go back up again on PEEP from 10-14 and FiO2 of 70% this morning artery blood gas shows a PCO2 of 60 and a PO2 of 60 this morning and her plateau pressure is 00-45, she is on proton volume ventilation entirely volume is 320, her endotracheal secretion is moderate and white pale. Chest x-ray today does not show much changes on the chest x-ray  Will continue with steroids. Will continue with low tidal volume ventilation with high PEEP and FiO2. Follow-up urine output and renal function and Will decreased IV fluids at this time and avoid diuretics as she developed worsening creatinine while she was on diuretics. She is on Rocephin and has been followed by infectious disease. Discussed with the respiratory therapist and nursing staff in detail and plan discuss.     Total critical care time caring for this patient with life threatening, unstable organ failure, including direct patient contact, management of life support systems, review of data including imaging and labs, discussions with other team members and physicians at least 27  Min so far today, excluding procedures.         Unknown Patient, MD  1/31/2018 10:54 AM

## 2018-01-31 NOTE — PLAN OF CARE
Problem: OXYGENATION/RESPIRATORY FUNCTION  Goal: Patient will maintain patent airway  Outcome: Ongoing    Goal: Patient will achieve/maintain normal respiratory rate/effort  Respiratory rate and effort will be within normal limits for the patient   Outcome: Ongoing      Problem: MECHANICAL VENTILATION  Goal: Patient will maintain patent airway  Outcome: Ongoing    Goal: Oral health is maintained or improved  Outcome: Ongoing    Goal: ET tube will be managed safely  Outcome: Ongoing    Goal: Ability to express needs and understand communication  Outcome: Ongoing    Goal: Mobility/activity is maintained at optimum level for patient  Outcome: Ongoing

## 2018-01-31 NOTE — PROGRESS NOTES
Physical Therapy  DATE: 2018  NAME: Elvin Patterson  MRN: [id number]   : 1975    Subjective: RN agreeable to range, pt intubated and sedated. Pain: RAMONE, pt responding to no commands. Patient follows: No commands  Is patient on ventilator: Yes  Is patient on sedation: Yes    Therapeutic exercises:  PROM all planes x 15 reps BUE and BLE. Bilateral gastrocnemius stretching    Goals  Short Term Goals  Short term goal 1: prevent contractures x 4  Short term goal 2: facilitate active movement x 4 when pt off sedation  Short term goal 3: mobilize pt when off sedation and set goals          Plan: Progress functional mobility as medically appropriate.    Time In: 1310  Time Out: 1328  Time Coded Minutes (treatment minutes): 18  Rehab Potential: guarded  Treatments/week: 2    Karmen Parker, PT

## 2018-01-31 NOTE — PROGRESS NOTES
[facility] - Daily Progress Note  Today's Date and Time: 1/31/2018, 10:11 AM    Impression :   1. COPD  2. Pulmonary edema   3. Hypertensive emergency  4. Community acquired pneumonia  5. Acute respiratory failure  6. NSTEMI  7. Rhinovirus infection    Recommendations:   · Rocephin 2 gm IV q 24 Hr  · Supportive care  · Zinc supplements  · Continue droplet isolation through 2-2-18    Interval History:   INITIAL HISTORY:  aSnti Reardon is a 43y.o.-year-old  female who was initially admitted on 1/26/2018. Patient seen at the request of Dr. Lluvia Tan.     Patient w/ hx of COPD on home O2 2.5L initially presented to ED for shortness of breath w/ chest pain. For past week patient has had increased cough and production of yellowish sputum w/ progressively increasing SOB along w/ feelings of fever. In ED she was found to have O2 saturation in 30s and 40s, hypertension of 187/81 and a cxr showing potential multifocal pneumonia and pulmonary edema w/ leukocytosis and increased troponins. She was given aspirin and Plavix in ED and admitted.       On admission she was placed on ceftriaxone and azithromycin for potential pneumonia and given solumedrol q8, lasix BID, and cardene for hypertension and pulmonary edema. We were consulted because the influenza PCR results are pending and patient is unable to take tamiflu PO. Inpatient team is requesting input regarding starting peramivir.       CURRENT EVALUATION: 1/31/2018     Afebrile, VS stable   Patient intubated.    BP moderately well controlled on PO meds  Unable to wean down FiO2, currently 70%     CULTURES  1/26/18 blood cx: no growth  1/26/18 rine cx: no growth  1/29/18 sputum cx: no growth    LABS  WBC 11.9     IMAGING  1/26/18 cxr: worsening diffuse b/l airspace opacities suggestive of multilobar pneumonia or pulmonary edema     1/26/18 echo: hyperdynamic left ventricle, otherwise normal    1/30/18 cxr: patchy --   9*     BMP:   Recent Labs      01/29/18   0714  01/30/18   0904  01/31/18   0552   NA  139  142  143   K  4.0  4.0  3.8   CL  101  100  102   CO2  23  27  28   BUN  17  30*  34*   CREATININE  0.96*  1.22*  1.12*   MG  2.4  2.7*   --      Hepatic Function Panel:     Medications:      predniSONE  40 mg Oral Daily    metoprolol tartrate  50 mg Oral BID    hydrALAZINE  25 mg Oral 3 times per day    amLODIPine  10 mg Oral Daily    heparin (porcine)  5,000 Units Subcutaneous 3 times per day    famotidine  20 mg Oral BID    cefTRIAXone (ROCEPHIN) IV  2 g Intravenous Q24H    nicotine  1 patch Transdermal Daily    sodium chloride flush  10 mL Intravenous 2 times per day    atorvastatin  40 mg Oral Nightly    albuterol  2.5 mg Nebulization BID    ipratropium-albuterol  1 ampule Inhalation 4x daily       Thank you for allowing us to participate in the care of this patient. Please call with questions.     Claudio Jameson MD  Pager: [phone]  - Office: [phone]

## 2018-02-01 ENCOUNTER — APPOINTMENT (OUTPATIENT)
Dept: GENERAL RADIOLOGY | Age: 43
DRG: 005 | End: 2018-02-01
Payer: MEDICARE

## 2018-02-01 LAB
ABSOLUTE EOS #: 0.15 K/UL (ref 0–0.44)
ABSOLUTE IMMATURE GRANULOCYTE: 0.25 K/UL (ref 0–0.3)
ABSOLUTE LYMPH #: 3.04 K/UL (ref 1.1–3.7)
ABSOLUTE MONO #: 1.25 K/UL (ref 0.1–1.2)
ACTION: NORMAL
ALLEN TEST: NEGATIVE
ALLEN TEST: POSITIVE
ALLEN TEST: POSITIVE
ANION GAP SERPL CALCULATED.3IONS-SCNC: 10 MMOL/L (ref 9–17)
BASOPHILS # BLD: 0 % (ref 0–2)
BASOPHILS ABSOLUTE: <0.03 K/UL (ref 0–0.2)
BUN BLDV-MCNC: 32 MG/DL (ref 6–20)
BUN/CREAT BLD: ABNORMAL (ref 9–20)
CALCIUM SERPL-MCNC: 8.8 MG/DL (ref 8.6–10.4)
CHLORIDE BLD-SCNC: 102 MMOL/L (ref 98–107)
CO2: 32 MMOL/L (ref 20–31)
CREAT SERPL-MCNC: 0.88 MG/DL (ref 0.5–0.9)
CULTURE: NORMAL
CULTURE: NORMAL
DATE AND TIME: NORMAL
DIFFERENTIAL TYPE: ABNORMAL
EOSINOPHILS RELATIVE PERCENT: 1 % (ref 1–4)
FIO2: 90
GFR AFRICAN AMERICAN: >60 ML/MIN
GFR NON-AFRICAN AMERICAN: >60 ML/MIN
GFR SERPL CREATININE-BSD FRML MDRD: ABNORMAL ML/MIN/{1.73_M2}
GFR SERPL CREATININE-BSD FRML MDRD: ABNORMAL ML/MIN/{1.73_M2}
GLUCOSE BLD-MCNC: 125 MG/DL (ref 70–99)
HCT VFR BLD CALC: 32.7 % (ref 36.3–47.1)
HEMOGLOBIN: 9.4 G/DL (ref 11.9–15.1)
IMMATURE GRANULOCYTES: 2 %
LYMPHOCYTES # BLD: 20 % (ref 24–43)
Lab: NORMAL
MCH RBC QN AUTO: 25.2 PG (ref 25.2–33.5)
MCHC RBC AUTO-ENTMCNC: 28.7 G/DL (ref 28.4–34.8)
MCV RBC AUTO: 87.7 FL (ref 82.6–102.9)
MODE: ABNORMAL
MONOCYTES # BLD: 8 % (ref 3–12)
NEGATIVE BASE EXCESS, ART: ABNORMAL (ref 0–2)
NOTIFY: NORMAL
NRBC AUTOMATED: 0.2 PER 100 WBC
O2 DEVICE/FLOW/%: ABNORMAL
PATIENT TEMP: ABNORMAL
PDW BLD-RTO: 15.2 % (ref 11.8–14.4)
PLATELET # BLD: 237 K/UL (ref 138–453)
PLATELET ESTIMATE: ABNORMAL
PMV BLD AUTO: 10.8 FL (ref 8.1–13.5)
POC HCO3: 34.1 MMOL/L (ref 21–28)
POC HCO3: 35 MMOL/L (ref 21–28)
POC HCO3: 35 MMOL/L (ref 21–28)
POC O2 SATURATION: 80 % (ref 94–98)
POC O2 SATURATION: 81 % (ref 94–98)
POC O2 SATURATION: 95 % (ref 94–98)
POC PCO2 TEMP: ABNORMAL MM HG
POC PCO2: 66.9 MM HG (ref 35–48)
POC PCO2: 77.5 MM HG (ref 35–48)
POC PCO2: 81.5 MM HG (ref 35–48)
POC PH TEMP: ABNORMAL
POC PH: 7.24 (ref 7.35–7.45)
POC PH: 7.26 (ref 7.35–7.45)
POC PH: 7.32 (ref 7.35–7.45)
POC PO2 TEMP: ABNORMAL MM HG
POC PO2: 52.6 MM HG (ref 83–108)
POC PO2: 55.6 MM HG (ref 83–108)
POC PO2: 84.4 MM HG (ref 83–108)
POSITIVE BASE EXCESS, ART: 5 (ref 0–3)
POSITIVE BASE EXCESS, ART: 6 (ref 0–3)
POSITIVE BASE EXCESS, ART: 6 (ref 0–3)
POTASSIUM SERPL-SCNC: 3 MMOL/L (ref 3.7–5.3)
POTASSIUM SERPL-SCNC: 4.8 MMOL/L (ref 3.7–5.3)
RBC # BLD: 3.73 M/UL (ref 3.95–5.11)
RBC # BLD: ABNORMAL 10*6/UL
READ BACK: YES
SAMPLE SITE: ABNORMAL
SEG NEUTROPHILS: 69 % (ref 36–65)
SEGMENTED NEUTROPHILS ABSOLUTE COUNT: 10.56 K/UL (ref 1.5–8.1)
SODIUM BLD-SCNC: 144 MMOL/L (ref 135–144)
SPECIMEN DESCRIPTION: NORMAL
STATUS: NORMAL
TCO2 (CALC), ART: 36 MMOL/L (ref 22–29)
TCO2 (CALC), ART: 37 MMOL/L (ref 22–29)
TCO2 (CALC), ART: 38 MMOL/L (ref 22–29)
TRIGL SERPL-MCNC: 158 MG/DL
WBC # BLD: 15.3 K/UL (ref 3.5–11.3)
WBC # BLD: ABNORMAL 10*3/UL

## 2018-02-01 PROCEDURE — 2500000003 HC RX 250 WO HCPCS: Performed by: INTERNAL MEDICINE

## 2018-02-01 PROCEDURE — 99291 CRITICAL CARE FIRST HOUR: CPT | Performed by: INTERNAL MEDICINE

## 2018-02-01 PROCEDURE — 6370000000 HC RX 637 (ALT 250 FOR IP): Performed by: HOSPITALIST

## 2018-02-01 PROCEDURE — 6360000002 HC RX W HCPCS: Performed by: INTERNAL MEDICINE

## 2018-02-01 PROCEDURE — 85025 COMPLETE CBC W/AUTO DIFF WBC: CPT

## 2018-02-01 PROCEDURE — 82803 BLOOD GASES ANY COMBINATION: CPT

## 2018-02-01 PROCEDURE — 6370000000 HC RX 637 (ALT 250 FOR IP): Performed by: STUDENT IN AN ORGANIZED HEALTH CARE EDUCATION/TRAINING PROGRAM

## 2018-02-01 PROCEDURE — 6360000002 HC RX W HCPCS: Performed by: STUDENT IN AN ORGANIZED HEALTH CARE EDUCATION/TRAINING PROGRAM

## 2018-02-01 PROCEDURE — 84132 ASSAY OF SERUM POTASSIUM: CPT

## 2018-02-01 PROCEDURE — 94003 VENT MGMT INPAT SUBQ DAY: CPT

## 2018-02-01 PROCEDURE — 76937 US GUIDE VASCULAR ACCESS: CPT

## 2018-02-01 PROCEDURE — 2580000003 HC RX 258: Performed by: INTERNAL MEDICINE

## 2018-02-01 PROCEDURE — 2580000003 HC RX 258: Performed by: STUDENT IN AN ORGANIZED HEALTH CARE EDUCATION/TRAINING PROGRAM

## 2018-02-01 PROCEDURE — 2500000003 HC RX 250 WO HCPCS: Performed by: STUDENT IN AN ORGANIZED HEALTH CARE EDUCATION/TRAINING PROGRAM

## 2018-02-01 PROCEDURE — 94762 N-INVAS EAR/PLS OXIMTRY CONT: CPT

## 2018-02-01 PROCEDURE — 94640 AIRWAY INHALATION TREATMENT: CPT

## 2018-02-01 PROCEDURE — 6370000000 HC RX 637 (ALT 250 FOR IP): Performed by: INTERNAL MEDICINE

## 2018-02-01 PROCEDURE — 84478 ASSAY OF TRIGLYCERIDES: CPT

## 2018-02-01 PROCEDURE — 71045 X-RAY EXAM CHEST 1 VIEW: CPT

## 2018-02-01 PROCEDURE — 80048 BASIC METABOLIC PNL TOTAL CA: CPT

## 2018-02-01 PROCEDURE — 36600 WITHDRAWAL OF ARTERIAL BLOOD: CPT

## 2018-02-01 PROCEDURE — 36415 COLL VENOUS BLD VENIPUNCTURE: CPT

## 2018-02-01 PROCEDURE — 94770 HC ETCO2 MONITOR DAILY: CPT

## 2018-02-01 PROCEDURE — 6360000002 HC RX W HCPCS: Performed by: EMERGENCY MEDICINE

## 2018-02-01 PROCEDURE — 2000000000 HC ICU R&B

## 2018-02-01 RX ORDER — METOPROLOL TARTRATE 50 MG/1
100 TABLET, FILM COATED ORAL 2 TIMES DAILY
Status: DISCONTINUED | OUTPATIENT
Start: 2018-02-01 | End: 2018-02-01

## 2018-02-01 RX ORDER — NICOTINE 21 MG/24HR
1 PATCH, TRANSDERMAL 24 HOURS TRANSDERMAL DAILY
Status: DISCONTINUED | OUTPATIENT
Start: 2018-02-01 | End: 2018-02-05

## 2018-02-01 RX ORDER — LABETALOL 200 MG/1
200 TABLET, FILM COATED ORAL EVERY 8 HOURS SCHEDULED
Status: DISCONTINUED | OUTPATIENT
Start: 2018-02-01 | End: 2018-02-02

## 2018-02-01 RX ORDER — LABETALOL HYDROCHLORIDE 5 MG/ML
10 INJECTION, SOLUTION INTRAVENOUS EVERY 4 HOURS PRN
Status: DISCONTINUED | OUTPATIENT
Start: 2018-02-01 | End: 2018-02-02

## 2018-02-01 RX ORDER — LISINOPRIL 5 MG/1
5 TABLET ORAL DAILY
Status: DISCONTINUED | OUTPATIENT
Start: 2018-02-01 | End: 2018-02-02

## 2018-02-01 RX ADMIN — ALBUTEROL SULFATE 2.5 MG: 2.5 SOLUTION RESPIRATORY (INHALATION) at 00:41

## 2018-02-01 RX ADMIN — FENTANYL CITRATE 100 MCG: 50 INJECTION INTRAMUSCULAR; INTRAVENOUS at 14:23

## 2018-02-01 RX ADMIN — SODIUM CHLORIDE 5 MG/HR: 9 INJECTION, SOLUTION INTRAVENOUS at 15:56

## 2018-02-01 RX ADMIN — AMLODIPINE BESYLATE 10 MG: 10 TABLET ORAL at 09:14

## 2018-02-01 RX ADMIN — PROPOFOL 50 MCG/KG/MIN: 10 INJECTION, EMULSION INTRAVENOUS at 03:50

## 2018-02-01 RX ADMIN — CISATRACURIUM BESYLATE 1.5 MCG/KG/MIN: 10 INJECTION INTRAVENOUS at 22:39

## 2018-02-01 RX ADMIN — CISATRACURIUM BESYLATE 2 MCG/KG/MIN: 10 INJECTION INTRAVENOUS at 11:52

## 2018-02-01 RX ADMIN — DESMOPRESSIN ACETATE 40 MG: 0.2 TABLET ORAL at 21:08

## 2018-02-01 RX ADMIN — SODIUM CHLORIDE 15 MG/HR: 9 INJECTION, SOLUTION INTRAVENOUS at 20:03

## 2018-02-01 RX ADMIN — FENTANYL CITRATE 100 MCG: 50 INJECTION INTRAMUSCULAR; INTRAVENOUS at 11:52

## 2018-02-01 RX ADMIN — PROPOFOL 50 MCG/KG/MIN: 10 INJECTION, EMULSION INTRAVENOUS at 06:03

## 2018-02-01 RX ADMIN — FENTANYL CITRATE 100 MCG: 50 INJECTION INTRAMUSCULAR; INTRAVENOUS at 01:35

## 2018-02-01 RX ADMIN — POTASSIUM CHLORIDE 40 MEQ: 40 SOLUTION ORAL at 17:02

## 2018-02-01 RX ADMIN — FENTANYL CITRATE 100 MCG: 50 INJECTION INTRAMUSCULAR; INTRAVENOUS at 09:10

## 2018-02-01 RX ADMIN — PROPOFOL 15 MCG/KG/MIN: 10 INJECTION, EMULSION INTRAVENOUS at 21:14

## 2018-02-01 RX ADMIN — PROPOFOL 30 MCG/KG/MIN: 10 INJECTION, EMULSION INTRAVENOUS at 17:02

## 2018-02-01 RX ADMIN — IPRATROPIUM BROMIDE AND ALBUTEROL SULFATE 1 AMPULE: .5; 3 SOLUTION RESPIRATORY (INHALATION) at 19:49

## 2018-02-01 RX ADMIN — HYDRALAZINE HYDROCHLORIDE 50 MG: 50 TABLET, FILM COATED ORAL at 05:20

## 2018-02-01 RX ADMIN — WATER 2 G: 1 INJECTION INTRAMUSCULAR; INTRAVENOUS; SUBCUTANEOUS at 13:16

## 2018-02-01 RX ADMIN — METOPROLOL TARTRATE 5 MG: 5 INJECTION, SOLUTION INTRAVENOUS at 01:08

## 2018-02-01 RX ADMIN — IPRATROPIUM BROMIDE AND ALBUTEROL SULFATE 1 AMPULE: .5; 3 SOLUTION RESPIRATORY (INHALATION) at 15:49

## 2018-02-01 RX ADMIN — ENOXAPARIN SODIUM 40 MG: 40 INJECTION SUBCUTANEOUS at 21:07

## 2018-02-01 RX ADMIN — PROPOFOL 50 MCG/KG/MIN: 10 INJECTION, EMULSION INTRAVENOUS at 08:32

## 2018-02-01 RX ADMIN — PROPOFOL 50 MCG/KG/MIN: 10 INJECTION, EMULSION INTRAVENOUS at 04:18

## 2018-02-01 RX ADMIN — HYDRALAZINE HYDROCHLORIDE 50 MG: 50 TABLET, FILM COATED ORAL at 21:07

## 2018-02-01 RX ADMIN — METOPROLOL TARTRATE 5 MG: 5 INJECTION, SOLUTION INTRAVENOUS at 05:08

## 2018-02-01 RX ADMIN — LISINOPRIL 5 MG: 5 TABLET ORAL at 17:12

## 2018-02-01 RX ADMIN — LABETALOL HYDROCHLORIDE 10 MG: 5 INJECTION, SOLUTION INTRAVENOUS at 15:07

## 2018-02-01 RX ADMIN — FAMOTIDINE 20 MG: 20 TABLET, FILM COATED ORAL at 09:15

## 2018-02-01 RX ADMIN — ALBUTEROL SULFATE 2.5 MG: 2.5 SOLUTION RESPIRATORY (INHALATION) at 23:59

## 2018-02-01 RX ADMIN — POTASSIUM CHLORIDE 10 MEQ: 7.46 INJECTION, SOLUTION INTRAVENOUS at 11:52

## 2018-02-01 RX ADMIN — HEPARIN SODIUM 5000 UNITS: 5000 INJECTION, SOLUTION INTRAVENOUS; SUBCUTANEOUS at 05:19

## 2018-02-01 RX ADMIN — PROPOFOL 40 MCG/KG/MIN: 10 INJECTION, EMULSION INTRAVENOUS at 13:00

## 2018-02-01 RX ADMIN — NICARDIPINE HYDROCHLORIDE 5 MG/HR: 0.1 INJECTION, SOLUTION INTRAVENOUS at 14:15

## 2018-02-01 RX ADMIN — FAMOTIDINE 20 MG: 20 TABLET, FILM COATED ORAL at 21:08

## 2018-02-01 RX ADMIN — HYDRALAZINE HYDROCHLORIDE 50 MG: 50 TABLET, FILM COATED ORAL at 13:15

## 2018-02-01 RX ADMIN — FENTANYL CITRATE 100 MCG: 50 INJECTION INTRAMUSCULAR; INTRAVENOUS at 05:41

## 2018-02-01 RX ADMIN — Medication 10 ML: at 09:42

## 2018-02-01 RX ADMIN — PROPOFOL 50 MCG/KG/MIN: 10 INJECTION, EMULSION INTRAVENOUS at 00:10

## 2018-02-01 RX ADMIN — METOPROLOL TARTRATE 5 MG: 5 INJECTION, SOLUTION INTRAVENOUS at 09:45

## 2018-02-01 RX ADMIN — FENTANYL CITRATE 100 MCG: 50 INJECTION INTRAMUSCULAR; INTRAVENOUS at 04:17

## 2018-02-01 RX ADMIN — FENTANYL CITRATE 100 MCG: 50 INJECTION INTRAMUSCULAR; INTRAVENOUS at 02:35

## 2018-02-01 RX ADMIN — FENTANYL CITRATE 100 MCG: 50 INJECTION INTRAMUSCULAR; INTRAVENOUS at 20:21

## 2018-02-01 RX ADMIN — ALBUTEROL SULFATE 2.5 MG: 2.5 SOLUTION RESPIRATORY (INHALATION) at 03:33

## 2018-02-01 RX ADMIN — LABETALOL HCL 200 MG: 200 TABLET, FILM COATED ORAL at 17:12

## 2018-02-01 RX ADMIN — PROPOFOL 50 MCG/KG/MIN: 10 INJECTION, EMULSION INTRAVENOUS at 01:47

## 2018-02-01 RX ADMIN — METOPROLOL TARTRATE 50 MG: 50 TABLET, FILM COATED ORAL at 09:15

## 2018-02-01 RX ADMIN — LABETALOL HCL 200 MG: 200 TABLET, FILM COATED ORAL at 21:07

## 2018-02-01 RX ADMIN — FENTANYL CITRATE 100 MCG: 50 INJECTION INTRAMUSCULAR; INTRAVENOUS at 00:31

## 2018-02-01 RX ADMIN — FENTANYL CITRATE 100 MCG: 50 INJECTION INTRAMUSCULAR; INTRAVENOUS at 22:26

## 2018-02-01 RX ADMIN — IPRATROPIUM BROMIDE AND ALBUTEROL SULFATE 1 AMPULE: .5; 3 SOLUTION RESPIRATORY (INHALATION) at 08:39

## 2018-02-01 RX ADMIN — PREDNISONE 40 MG: 20 TABLET ORAL at 09:14

## 2018-02-01 RX ADMIN — FENTANYL CITRATE 100 MCG: 50 INJECTION INTRAMUSCULAR; INTRAVENOUS at 10:16

## 2018-02-01 RX ADMIN — IPRATROPIUM BROMIDE AND ALBUTEROL SULFATE 1 AMPULE: .5; 3 SOLUTION RESPIRATORY (INHALATION) at 12:19

## 2018-02-01 ASSESSMENT — PAIN SCALES - GENERAL
PAINLEVEL_OUTOF10: 4
PAINLEVEL_OUTOF10: 1

## 2018-02-01 ASSESSMENT — PULMONARY FUNCTION TESTS
PIF_VALUE: 35
PIF_VALUE: 38
PIF_VALUE: 43
PIF_VALUE: 41
PIF_VALUE: 42
PIF_VALUE: 39
PIF_VALUE: 37
PIF_VALUE: 35
PIF_VALUE: 28

## 2018-02-01 NOTE — PLAN OF CARE
Problem: Falls - Risk of  Goal: Absence of falls  Outcome: Ongoing      Problem: Risk for Impaired Skin Integrity  Goal: Tissue integrity - skin and mucous membranes  Structural intactness and normal physiological function of skin and  mucous membranes.    Outcome: Ongoing      Problem: Restraint Use - Nonviolent/Non-Self-Destructive Behavior:  Goal: Absence of restraint indications  Absence of restraint indications   Outcome: Not Met This Shift    Goal: Absence of restraint-related injury  Absence of restraint-related injury   Outcome: Ongoing      Problem: Nutrition  Goal: Optimal nutrition therapy  Outcome: Ongoing      Problem: ABCDS Injury Assessment  Goal: Absence of physical injury  Outcome: Ongoing      Problem: OXYGENATION/RESPIRATORY FUNCTION  Goal: Patient will maintain patent airway  Outcome: Ongoing

## 2018-02-01 NOTE — PROGRESS NOTES
[] negative                                                                                                                     [] pending)    VASOPRESSORS:  [x] No    [] Yes    If yes -   [] Levophed       [] Dopamine     [] Vasopressin       [] Dobutamine  [] Phenylephrine         [] Epinephrine    CENTRAL LINES:     [x] No   [] Yes   (Date of Insertion:   )           If yes -     [] Right IJ     [] Left IJ [] Right Femoral [] Left Femoral                   [] Right Subclavian [] Left Subclavian       PASTRANA'S CATHETER:   [] No   [x] Yes  (Date of Insertion: 2018  )     URINE OUTPUT:            [x] Good   [] Low              [] Anuric    Ros unable to perform due to intubation and sedation    OBJECTIVE:     VITAL SIGNS:  BP (!) 119/34   Pulse 73   Temp 98.8 °F (37.1 °C) (Oral)   Resp (!) 34   Ht 5' (1.524 m)   Wt (!) 337 lb 8 oz (153.1 kg)   SpO2 99%   BMI 65.91 kg/m²   Tmax over 24 hours:  Temp (24hrs), Av.2 °F (37.3 °C), Min:98.8 °F (37.1 °C), Max:99.8 °F (37.7 °C)      Patient Vitals for the past 6 hrs:   BP Temp Temp src Pulse Resp SpO2   18 0648 - - - 73 (!) 34 99 %   18 0600 (!) 119/34 - - 73 (!) 31 97 %   18 0500 (!) 140/53 - - 77 (!) 32 99 %   18 0428 - - - - (!) 31 96 %   18 0400 (!) 189/75 98.8 °F (37.1 °C) Oral 93 29 98 %   18 0334 - - - 76 (!) 31 97 %   18 0300 (!) 135/57 - - 75 28 95 %   18 0200 (!) 154/64 - - 81 28 96 %         Intake/Output Summary (Last 24 hours) at 18 0733  Last data filed at 18 0600   Gross per 24 hour   Intake             2076 ml   Output             2525 ml   Net             -449 ml     Wt Readings from Last 2 Encounters:   18 (!) 337 lb 8 oz (153.1 kg)     Body mass index is 65.91 kg/m².       Review of Systems -   Unable to obtain    PHYSICAL EXAMINATION:    Constitutional: Morbidly Obese, intubated and sedated  EENT: PERRLA, EOMI, sclera clear, anicteric, oropharynx clear, no lesions, neck supple with midline trachea.   Neck: Supple, symmetrical, trachea midline, no adenopathy, thyroid symmetric, no jvd skin normal  Respiratory: noisy breathing  Cardiovascular: regular rate and rhythm, normal S1, S2, no murmur noted and 2+ pulses throughout  Abdomen: soft, nontender, nondistended, no masses or organomegaly  Extremities: Bilateral lower extremity  1+ pitting edema      Any additional physical findings:    MEDICATIONS:    Scheduled Meds:   hydrALAZINE  50 mg Oral 3 times per day    predniSONE  40 mg Oral Daily    metoprolol tartrate  50 mg Oral BID    amLODIPine  10 mg Oral Daily    heparin (porcine)  5,000 Units Subcutaneous 3 times per day    famotidine  20 mg Oral BID    cefTRIAXone (ROCEPHIN) IV  2 g Intravenous Q24H    nicotine  1 patch Transdermal Daily    sodium chloride flush  10 mL Intravenous 2 times per day    atorvastatin  40 mg Oral Nightly    albuterol  2.5 mg Nebulization BID    ipratropium-albuterol  1 ampule Inhalation 4x daily     Continuous Infusions:   sodium chloride 125 mL/hr at 01/31/18 1611    dextrose      propofol 50 mcg/kg/min (02/01/18 0603)     PRN Meds:     metoprolol 5 mg Q4H PRN   Or     metoprolol 5 mg Q6H PRN   albuterol 2.5 mg Q6H PRN   glucose 15 g PRN   dextrose 12.5 g PRN   glucagon (rDNA) 1 mg PRN   dextrose 100 mL/hr PRN   fentanNYL 50 mcg Q1H PRN   Or     fentanNYL 100 mcg Q1H PRN   lidocaine viscous 5 mL PRN   magnesium hydroxide 30 mL Daily PRN   ondansetron 4 mg Q6H PRN   potassium chloride 40 mEq PRN   Or     potassium chloride 40 mEq PRN   Or     potassium chloride 10 mEq PRN   sodium chloride flush 10 mL PRN   acetaminophen 650 mg Q4H PRN         VENT SETTINGS (Comprehensive) (if applicable):  Vent Information  Vent Type: Servo i  Vent Mode: PRVC/AC  Vt Ordered: 320 mL  Vt Exhaled: 327 mL  Pressure Ordered: 10  Rate Set: 32 bmp  Rate Measured: 35 br/min  Minute Volume: 10.8 Liters  Pressure Support: 10 cmH20  FiO2 : (S) 80 %  Peak Inspiratory Pressure: 41 cmH2O  I:E Ratio: 1:1.85  Sensitivity: 5  PEEP/CPAP: (S) 14  I Time/ I Time %: 0.65 s  Mean Airway Pressure: 23.5 cmH20  Plateau Pressure: 22 cmH20  Static Compliance: 21 mL/cmH2O  Dynamic Compliance: 18 mL/cmH2O  Total PEEP: 15 cmH20  Auto PEEP: 1 cmH20  Additional Respiratory  Assessments  Pulse: 73  Resp: (!) 34  SpO2: 99 %  End Tidal CO2: 67 (%)  Position: Sitting  Humidification Temp: 36.8  Circuit Condensation: Drained  Oral Care Completed?: Yes  Oral Care: Mouth moisturizer, Mouth suctioned  Subglottic Suction Done?: Yes    ABGs:     Laboratory findings:    Complete Blood Count:   Recent Labs      01/30/18 0904   WBC  11.9*   HGB  10.6*   HCT  36.4   PLT  See Reflexed IPF Result        Last 3 Blood Glucose:   Recent Labs      01/30/18   0904  01/31/18   0552   GLUCOSE  176*  135*        PT/INR:    Lab Results   Component Value Date    PROTIME 11.0 01/26/2018    INR 1.0 01/26/2018     PTT:    Lab Results   Component Value Date    APTT 30.2 01/27/2018       Comprehensive Metabolic Profile:   Recent Labs      01/30/18   0904  01/31/18   0552   NA  142  143   K  4.0  3.8   CL  100  102   CO2  27  28   BUN  30*  34*   CREATININE  1.22*  1.12*   GLUCOSE  176*  135*   CALCIUM  8.3*  8.3*      Magnesium:   Lab Results   Component Value Date    MG 2.7 01/30/2018     Phosphorus:   Lab Results   Component Value Date    PHOS 3.7 01/29/2018     Ionized Calcium:   Lab Results   Component Value Date    CAION 1.04 01/30/2018        Urinalysis:     Troponin:   No results for input(s): TROPONINI in the last 72 hours.     Microbiology:    Cultures during this admission:     Blood cultures: 1/26/2018 no growth x 2 days                [] None drawn      [] Negative             []  Positive (Details:  )  Urine Culture:   1/26/2018 negative                [] None drawn      [] Negative             []  Positive (Details:  )  Sputum Culture:               [] None drawn       []

## 2018-02-02 ENCOUNTER — APPOINTMENT (OUTPATIENT)
Dept: GENERAL RADIOLOGY | Age: 43
DRG: 005 | End: 2018-02-02
Payer: MEDICARE

## 2018-02-02 PROBLEM — J80 ARDS (ADULT RESPIRATORY DISTRESS SYNDROME) (HCC): Status: ACTIVE | Noted: 2018-02-02

## 2018-02-02 LAB
ABSOLUTE EOS #: 0.11 K/UL (ref 0–0.44)
ABSOLUTE IMMATURE GRANULOCYTE: 0.19 K/UL (ref 0–0.3)
ABSOLUTE LYMPH #: 2.5 K/UL (ref 1.1–3.7)
ABSOLUTE MONO #: 1.12 K/UL (ref 0.1–1.2)
ACTION: NORMAL
ALLEN TEST: POSITIVE
ANION GAP SERPL CALCULATED.3IONS-SCNC: 12 MMOL/L (ref 9–17)
ANION GAP SERPL CALCULATED.3IONS-SCNC: 14 MMOL/L (ref 9–17)
BASOPHILS # BLD: 0 % (ref 0–2)
BASOPHILS ABSOLUTE: <0.03 K/UL (ref 0–0.2)
BUN BLDV-MCNC: 33 MG/DL (ref 6–20)
BUN BLDV-MCNC: 34 MG/DL (ref 6–20)
BUN/CREAT BLD: ABNORMAL (ref 9–20)
BUN/CREAT BLD: ABNORMAL (ref 9–20)
CALCIUM IONIZED: 1.22 MMOL/L (ref 1.13–1.33)
CALCIUM SERPL-MCNC: 8.1 MG/DL (ref 8.6–10.4)
CALCIUM SERPL-MCNC: 8.3 MG/DL (ref 8.6–10.4)
CHLORIDE BLD-SCNC: 102 MMOL/L (ref 98–107)
CHLORIDE BLD-SCNC: 99 MMOL/L (ref 98–107)
CO2: 28 MMOL/L (ref 20–31)
CO2: 29 MMOL/L (ref 20–31)
CREAT SERPL-MCNC: 0.7 MG/DL (ref 0.5–0.9)
CREAT SERPL-MCNC: 0.73 MG/DL (ref 0.5–0.9)
DATE AND TIME: NORMAL
DIFFERENTIAL TYPE: ABNORMAL
EKG ATRIAL RATE: 79 BPM
EKG P AXIS: 31 DEGREES
EKG P-R INTERVAL: 126 MS
EKG Q-T INTERVAL: 406 MS
EKG QRS DURATION: 88 MS
EKG QTC CALCULATION (BAZETT): 465 MS
EKG R AXIS: 29 DEGREES
EKG T AXIS: 34 DEGREES
EKG VENTRICULAR RATE: 79 BPM
EOSINOPHILS RELATIVE PERCENT: 1 % (ref 1–4)
FIO2: 100
FIO2: 70
FIO2: 70
GFR AFRICAN AMERICAN: >60 ML/MIN
GFR AFRICAN AMERICAN: >60 ML/MIN
GFR NON-AFRICAN AMERICAN: >60 ML/MIN
GFR NON-AFRICAN AMERICAN: >60 ML/MIN
GFR SERPL CREATININE-BSD FRML MDRD: ABNORMAL ML/MIN/{1.73_M2}
GLUCOSE BLD-MCNC: 116 MG/DL (ref 70–99)
GLUCOSE BLD-MCNC: 133 MG/DL (ref 70–99)
HCT VFR BLD CALC: 33.2 % (ref 36.3–47.1)
HEMOGLOBIN: 9.7 G/DL (ref 11.9–15.1)
IMMATURE GRANULOCYTES: 1 %
LYMPHOCYTES # BLD: 14 % (ref 24–43)
MAGNESIUM: 2.2 MG/DL (ref 1.6–2.6)
MCH RBC QN AUTO: 25.6 PG (ref 25.2–33.5)
MCHC RBC AUTO-ENTMCNC: 29.2 G/DL (ref 28.4–34.8)
MCV RBC AUTO: 87.6 FL (ref 82.6–102.9)
METHEMOGLOBIN: 0.7 % (ref 0–1.5)
MODE: ABNORMAL
MONOCYTES # BLD: 6 % (ref 3–12)
NEGATIVE BASE EXCESS, ART: ABNORMAL (ref 0–2)
NOTIFY: NORMAL
NRBC AUTOMATED: 0.1 PER 100 WBC
O2 DEVICE/FLOW/%: ABNORMAL
PATIENT TEMP: ABNORMAL
PDW BLD-RTO: 14.7 % (ref 11.8–14.4)
PHOSPHORUS: 3.7 MG/DL (ref 2.6–4.5)
PLATELET # BLD: 219 K/UL (ref 138–453)
PLATELET ESTIMATE: ABNORMAL
PMV BLD AUTO: 11.2 FL (ref 8.1–13.5)
POC HCO3: 32.7 MMOL/L (ref 21–28)
POC HCO3: 36.4 MMOL/L (ref 21–28)
POC HCO3: 36.7 MMOL/L (ref 21–28)
POC O2 SATURATION: 84 % (ref 94–98)
POC O2 SATURATION: 86 % (ref 94–98)
POC O2 SATURATION: 88 % (ref 94–98)
POC PCO2 TEMP: ABNORMAL MM HG
POC PCO2: 58 MM HG (ref 35–48)
POC PCO2: 67.5 MM HG (ref 35–48)
POC PCO2: 76.2 MM HG (ref 35–48)
POC PH TEMP: ABNORMAL
POC PH: 7.29 (ref 7.35–7.45)
POC PH: 7.34 (ref 7.35–7.45)
POC PH: 7.36 (ref 7.35–7.45)
POC PO2 TEMP: ABNORMAL MM HG
POC PO2: 54.7 MM HG (ref 83–108)
POC PO2: 56.9 MM HG (ref 83–108)
POC PO2: 61 MM HG (ref 83–108)
POSITIVE BASE EXCESS, ART: 6 (ref 0–3)
POSITIVE BASE EXCESS, ART: 8 (ref 0–3)
POSITIVE BASE EXCESS, ART: 9 (ref 0–3)
POTASSIUM SERPL-SCNC: 4.2 MMOL/L (ref 3.7–5.3)
POTASSIUM SERPL-SCNC: 4.3 MMOL/L (ref 3.7–5.3)
PROCALCITONIN: 0.85 NG/ML
RBC # BLD: 3.79 M/UL (ref 3.95–5.11)
RBC # BLD: ABNORMAL 10*6/UL
READ BACK: YES
SAMPLE SITE: ABNORMAL
SEG NEUTROPHILS: 78 % (ref 36–65)
SEGMENTED NEUTROPHILS ABSOLUTE COUNT: 14.03 K/UL (ref 1.5–8.1)
SODIUM BLD-SCNC: 140 MMOL/L (ref 135–144)
SODIUM BLD-SCNC: 144 MMOL/L (ref 135–144)
TCO2 (CALC), ART: 35 MMOL/L (ref 22–29)
TCO2 (CALC), ART: 39 MMOL/L (ref 22–29)
TCO2 (CALC), ART: 39 MMOL/L (ref 22–29)
TROPONIN INTERP: NORMAL
TROPONIN INTERP: NORMAL
TROPONIN T: <0.03 NG/ML
TROPONIN T: <0.03 NG/ML
WBC # BLD: 18 K/UL (ref 3.5–11.3)
WBC # BLD: ABNORMAL 10*3/UL

## 2018-02-02 PROCEDURE — 4100000001 HC NITRIC OX DAILY

## 2018-02-02 PROCEDURE — 2580000003 HC RX 258: Performed by: INTERNAL MEDICINE

## 2018-02-02 PROCEDURE — 6360000002 HC RX W HCPCS: Performed by: INTERNAL MEDICINE

## 2018-02-02 PROCEDURE — 6370000000 HC RX 637 (ALT 250 FOR IP): Performed by: HOSPITALIST

## 2018-02-02 PROCEDURE — 6370000000 HC RX 637 (ALT 250 FOR IP): Performed by: STUDENT IN AN ORGANIZED HEALTH CARE EDUCATION/TRAINING PROGRAM

## 2018-02-02 PROCEDURE — 99291 CRITICAL CARE FIRST HOUR: CPT | Performed by: INTERNAL MEDICINE

## 2018-02-02 PROCEDURE — 83735 ASSAY OF MAGNESIUM: CPT

## 2018-02-02 PROCEDURE — 6360000002 HC RX W HCPCS: Performed by: EMERGENCY MEDICINE

## 2018-02-02 PROCEDURE — 82803 BLOOD GASES ANY COMBINATION: CPT

## 2018-02-02 PROCEDURE — 71045 X-RAY EXAM CHEST 1 VIEW: CPT

## 2018-02-02 PROCEDURE — 6370000000 HC RX 637 (ALT 250 FOR IP): Performed by: EMERGENCY MEDICINE

## 2018-02-02 PROCEDURE — 82330 ASSAY OF CALCIUM: CPT

## 2018-02-02 PROCEDURE — 94770 HC ETCO2 MONITOR DAILY: CPT

## 2018-02-02 PROCEDURE — 6360000002 HC RX W HCPCS

## 2018-02-02 PROCEDURE — 2500000003 HC RX 250 WO HCPCS: Performed by: INTERNAL MEDICINE

## 2018-02-02 PROCEDURE — 36415 COLL VENOUS BLD VENIPUNCTURE: CPT

## 2018-02-02 PROCEDURE — 2000000000 HC ICU R&B

## 2018-02-02 PROCEDURE — 94003 VENT MGMT INPAT SUBQ DAY: CPT

## 2018-02-02 PROCEDURE — 6360000002 HC RX W HCPCS: Performed by: STUDENT IN AN ORGANIZED HEALTH CARE EDUCATION/TRAINING PROGRAM

## 2018-02-02 PROCEDURE — 76937 US GUIDE VASCULAR ACCESS: CPT

## 2018-02-02 PROCEDURE — 02HV33Z INSERTION OF INFUSION DEVICE INTO SUPERIOR VENA CAVA, PERCUTANEOUS APPROACH: ICD-10-PCS | Performed by: INTERNAL MEDICINE

## 2018-02-02 PROCEDURE — 6370000000 HC RX 637 (ALT 250 FOR IP): Performed by: INTERNAL MEDICINE

## 2018-02-02 PROCEDURE — 84484 ASSAY OF TROPONIN QUANT: CPT

## 2018-02-02 PROCEDURE — 85025 COMPLETE CBC W/AUTO DIFF WBC: CPT

## 2018-02-02 PROCEDURE — 36600 WITHDRAWAL OF ARTERIAL BLOOD: CPT

## 2018-02-02 PROCEDURE — 83050 HGB METHEMOGLOBIN QUAN: CPT

## 2018-02-02 PROCEDURE — 84100 ASSAY OF PHOSPHORUS: CPT

## 2018-02-02 PROCEDURE — 80048 BASIC METABOLIC PNL TOTAL CA: CPT

## 2018-02-02 PROCEDURE — 94762 N-INVAS EAR/PLS OXIMTRY CONT: CPT

## 2018-02-02 PROCEDURE — C1751 CATH, INF, PER/CENT/MIDLINE: HCPCS

## 2018-02-02 PROCEDURE — 84145 PROCALCITONIN (PCT): CPT

## 2018-02-02 PROCEDURE — 94640 AIRWAY INHALATION TREATMENT: CPT

## 2018-02-02 PROCEDURE — 36569 INSJ PICC 5 YR+ W/O IMAGING: CPT

## 2018-02-02 PROCEDURE — 93005 ELECTROCARDIOGRAM TRACING: CPT

## 2018-02-02 RX ORDER — LABETALOL 100 MG/1
100 TABLET, FILM COATED ORAL EVERY 8 HOURS SCHEDULED
Status: DISCONTINUED | OUTPATIENT
Start: 2018-02-02 | End: 2018-02-02

## 2018-02-02 RX ORDER — HYDRALAZINE HYDROCHLORIDE 20 MG/ML
20 INJECTION INTRAMUSCULAR; INTRAVENOUS ONCE
Status: COMPLETED | OUTPATIENT
Start: 2018-02-02 | End: 2018-02-02

## 2018-02-02 RX ORDER — ATROPINE SULFATE 0.4 MG/ML
AMPUL (ML) INJECTION
Status: DISCONTINUED
Start: 2018-02-02 | End: 2018-02-02 | Stop reason: WASHOUT

## 2018-02-02 RX ORDER — HYDRALAZINE HYDROCHLORIDE 20 MG/ML
INJECTION INTRAMUSCULAR; INTRAVENOUS
Status: COMPLETED
Start: 2018-02-02 | End: 2018-02-02

## 2018-02-02 RX ORDER — HYDRALAZINE HYDROCHLORIDE 20 MG/ML
20 INJECTION INTRAMUSCULAR; INTRAVENOUS
Status: DISCONTINUED | OUTPATIENT
Start: 2018-02-02 | End: 2018-02-03

## 2018-02-02 RX ORDER — LISINOPRIL 10 MG/1
10 TABLET ORAL DAILY
Status: DISCONTINUED | OUTPATIENT
Start: 2018-02-03 | End: 2018-02-03

## 2018-02-02 RX ORDER — LIDOCAINE HYDROCHLORIDE 10 MG/ML
5 INJECTION, SOLUTION INFILTRATION; PERINEURAL ONCE
Status: DISCONTINUED | OUTPATIENT
Start: 2018-02-02 | End: 2018-02-07

## 2018-02-02 RX ORDER — HYDRALAZINE HYDROCHLORIDE 20 MG/ML
10 INJECTION INTRAMUSCULAR; INTRAVENOUS
Status: DISCONTINUED | OUTPATIENT
Start: 2018-02-02 | End: 2018-02-02

## 2018-02-02 RX ORDER — SODIUM CHLORIDE 0.9 % (FLUSH) 0.9 %
10 SYRINGE (ML) INJECTION PRN
Status: DISCONTINUED | OUTPATIENT
Start: 2018-02-02 | End: 2018-02-23 | Stop reason: HOSPADM

## 2018-02-02 RX ORDER — SODIUM CHLORIDE 0.9 % (FLUSH) 0.9 %
10 SYRINGE (ML) INJECTION EVERY 12 HOURS SCHEDULED
Status: DISCONTINUED | OUTPATIENT
Start: 2018-02-02 | End: 2018-02-23 | Stop reason: HOSPADM

## 2018-02-02 RX ADMIN — ALBUTEROL SULFATE 2.5 MG: 2.5 SOLUTION RESPIRATORY (INHALATION) at 23:59

## 2018-02-02 RX ADMIN — SODIUM CHLORIDE 15 MG/HR: 9 INJECTION, SOLUTION INTRAVENOUS at 22:08

## 2018-02-02 RX ADMIN — PROPOFOL 35 MCG/KG/MIN: 10 INJECTION, EMULSION INTRAVENOUS at 22:00

## 2018-02-02 RX ADMIN — PROPOFOL 30 MCG/KG/MIN: 10 INJECTION, EMULSION INTRAVENOUS at 11:15

## 2018-02-02 RX ADMIN — AMLODIPINE BESYLATE 10 MG: 10 TABLET ORAL at 09:09

## 2018-02-02 RX ADMIN — IPRATROPIUM BROMIDE AND ALBUTEROL SULFATE 1 AMPULE: .5; 3 SOLUTION RESPIRATORY (INHALATION) at 08:14

## 2018-02-02 RX ADMIN — HYDRALAZINE HYDROCHLORIDE 10 MG: 20 INJECTION INTRAMUSCULAR; INTRAVENOUS at 05:10

## 2018-02-02 RX ADMIN — LISINOPRIL 5 MG: 5 TABLET ORAL at 09:08

## 2018-02-02 RX ADMIN — HYDRALAZINE HYDROCHLORIDE 20 MG: 20 INJECTION INTRAMUSCULAR; INTRAVENOUS at 16:40

## 2018-02-02 RX ADMIN — PROPOFOL 20 MCG/KG/MIN: 10 INJECTION, EMULSION INTRAVENOUS at 02:15

## 2018-02-02 RX ADMIN — PROPOFOL 30 MCG/KG/MIN: 10 INJECTION, EMULSION INTRAVENOUS at 19:07

## 2018-02-02 RX ADMIN — SODIUM CHLORIDE: 9 INJECTION, SOLUTION INTRAVENOUS at 18:34

## 2018-02-02 RX ADMIN — FENTANYL CITRATE 100 MCG: 50 INJECTION INTRAMUSCULAR; INTRAVENOUS at 22:14

## 2018-02-02 RX ADMIN — IPRATROPIUM BROMIDE AND ALBUTEROL SULFATE 1 AMPULE: .5; 3 SOLUTION RESPIRATORY (INHALATION) at 20:02

## 2018-02-02 RX ADMIN — HYDRALAZINE HYDROCHLORIDE 10 MG: 20 INJECTION INTRAMUSCULAR; INTRAVENOUS at 04:03

## 2018-02-02 RX ADMIN — FENTANYL CITRATE 100 MCG: 50 INJECTION INTRAMUSCULAR; INTRAVENOUS at 02:07

## 2018-02-02 RX ADMIN — ACETAMINOPHEN 650 MG: 325 TABLET ORAL at 20:25

## 2018-02-02 RX ADMIN — ALBUTEROL SULFATE 2.5 MG: 2.5 SOLUTION RESPIRATORY (INHALATION) at 03:24

## 2018-02-02 RX ADMIN — FENTANYL CITRATE 100 MCG: 50 INJECTION INTRAMUSCULAR; INTRAVENOUS at 06:28

## 2018-02-02 RX ADMIN — HYDRALAZINE HYDROCHLORIDE 20 MG: 20 INJECTION INTRAMUSCULAR; INTRAVENOUS at 20:08

## 2018-02-02 RX ADMIN — SODIUM CHLORIDE 15 MG/HR: 9 INJECTION, SOLUTION INTRAVENOUS at 18:35

## 2018-02-02 RX ADMIN — HYDRALAZINE HYDROCHLORIDE 20 MG: 20 INJECTION INTRAMUSCULAR; INTRAVENOUS at 10:38

## 2018-02-02 RX ADMIN — FENTANYL CITRATE 100 MCG: 50 INJECTION INTRAMUSCULAR; INTRAVENOUS at 21:15

## 2018-02-02 RX ADMIN — FENTANYL CITRATE 100 MCG: 50 INJECTION INTRAMUSCULAR; INTRAVENOUS at 05:17

## 2018-02-02 RX ADMIN — FENTANYL CITRATE 100 MCG: 50 INJECTION INTRAMUSCULAR; INTRAVENOUS at 01:05

## 2018-02-02 RX ADMIN — FENTANYL CITRATE 100 MCG: 50 INJECTION INTRAMUSCULAR; INTRAVENOUS at 18:18

## 2018-02-02 RX ADMIN — WATER 2 G: 1 INJECTION INTRAMUSCULAR; INTRAVENOUS; SUBCUTANEOUS at 13:19

## 2018-02-02 RX ADMIN — SODIUM CHLORIDE 16 MG/HR: 9 INJECTION, SOLUTION INTRAVENOUS at 07:53

## 2018-02-02 RX ADMIN — DESMOPRESSIN ACETATE 40 MG: 0.2 TABLET ORAL at 20:18

## 2018-02-02 RX ADMIN — HYDRALAZINE HYDROCHLORIDE 10 MG: 20 INJECTION, SOLUTION INTRAMUSCULAR; INTRAVENOUS at 05:10

## 2018-02-02 RX ADMIN — HYDRALAZINE HYDROCHLORIDE 20 MG: 20 INJECTION INTRAMUSCULAR; INTRAVENOUS at 22:10

## 2018-02-02 RX ADMIN — FENTANYL CITRATE 100 MCG: 50 INJECTION INTRAMUSCULAR; INTRAVENOUS at 00:02

## 2018-02-02 RX ADMIN — HYDRALAZINE HYDROCHLORIDE 10 MG: 20 INJECTION, SOLUTION INTRAMUSCULAR; INTRAVENOUS at 06:02

## 2018-02-02 RX ADMIN — PREDNISONE 30 MG: 20 TABLET ORAL at 09:10

## 2018-02-02 RX ADMIN — FAMOTIDINE 20 MG: 20 TABLET, FILM COATED ORAL at 09:09

## 2018-02-02 RX ADMIN — PROPOFOL 30 MCG/KG/MIN: 10 INJECTION, EMULSION INTRAVENOUS at 14:48

## 2018-02-02 RX ADMIN — FENTANYL CITRATE 100 MCG: 50 INJECTION INTRAMUSCULAR; INTRAVENOUS at 04:09

## 2018-02-02 RX ADMIN — FENTANYL CITRATE 100 MCG: 50 INJECTION INTRAMUSCULAR; INTRAVENOUS at 20:08

## 2018-02-02 RX ADMIN — IPRATROPIUM BROMIDE AND ALBUTEROL SULFATE 1 AMPULE: .5; 3 SOLUTION RESPIRATORY (INHALATION) at 11:42

## 2018-02-02 RX ADMIN — FENTANYL CITRATE 100 MCG: 50 INJECTION INTRAMUSCULAR; INTRAVENOUS at 03:08

## 2018-02-02 RX ADMIN — PROPOFOL 30 MCG/KG/MIN: 10 INJECTION, EMULSION INTRAVENOUS at 06:46

## 2018-02-02 RX ADMIN — CISATRACURIUM BESYLATE 1.5 MCG/KG/MIN: 10 INJECTION INTRAVENOUS at 16:40

## 2018-02-02 RX ADMIN — HYDRALAZINE HYDROCHLORIDE 75 MG: 50 TABLET, FILM COATED ORAL at 22:08

## 2018-02-02 RX ADMIN — FAMOTIDINE 20 MG: 20 TABLET, FILM COATED ORAL at 20:18

## 2018-02-02 RX ADMIN — SODIUM CHLORIDE 16 MG/HR: 9 INJECTION, SOLUTION INTRAVENOUS at 11:53

## 2018-02-02 RX ADMIN — ENOXAPARIN SODIUM 40 MG: 40 INJECTION SUBCUTANEOUS at 20:18

## 2018-02-02 RX ADMIN — HYDRALAZINE HYDROCHLORIDE 75 MG: 50 TABLET, FILM COATED ORAL at 15:10

## 2018-02-02 RX ADMIN — HYDRALAZINE HYDROCHLORIDE 75 MG: 50 TABLET, FILM COATED ORAL at 06:31

## 2018-02-02 RX ADMIN — FENTANYL CITRATE 100 MCG: 50 INJECTION INTRAMUSCULAR; INTRAVENOUS at 15:41

## 2018-02-02 RX ADMIN — ENOXAPARIN SODIUM 40 MG: 40 INJECTION SUBCUTANEOUS at 09:10

## 2018-02-02 RX ADMIN — FENTANYL CITRATE 100 MCG: 50 INJECTION INTRAMUSCULAR; INTRAVENOUS at 12:50

## 2018-02-02 RX ADMIN — IPRATROPIUM BROMIDE AND ALBUTEROL SULFATE 1 AMPULE: .5; 3 SOLUTION RESPIRATORY (INHALATION) at 15:40

## 2018-02-02 ASSESSMENT — PULMONARY FUNCTION TESTS
PIF_VALUE: 44
PIF_VALUE: 46
PIF_VALUE: 40
PIF_VALUE: 45
PIF_VALUE: 41
PIF_VALUE: 46
PIF_VALUE: 44
PIF_VALUE: 43
PIF_VALUE: 45
PIF_VALUE: 39
PIF_VALUE: 41
PIF_VALUE: 36
PIF_VALUE: 45
PIF_VALUE: 46
PIF_VALUE: 45
PIF_VALUE: 42

## 2018-02-02 NOTE — PLAN OF CARE
Problem: Restraint Use - Nonviolent/Non-Self-Destructive Behavior:  Goal: Absence of restraint indications  Absence of restraint indications   Outcome: Completed Date Met: 02/01/18    Goal: Absence of restraint-related injury  Absence of restraint-related injury   Outcome: Completed Date Met: 02/01/18

## 2018-02-02 NOTE — FLOWSHEET NOTE
Train of four assessed - 0/4 at 40amps @ ulnar; 4/4 at 20amps @ eyebrow. Patient not initiating breaths/breathing over ventilator; no change made to nimbex gtt.

## 2018-02-02 NOTE — PROGRESS NOTES
[facility] [address]  Occupational Therapy Not Seen Note    Patient not available for Occupational Therapy due to:    [] Testing:    [] Hemodialysis    [] Blood Transfusion in Progress    []Refusal by Patient:    [] Surgery/Procedure:    [] Strict Bedrest    [x] Sedation: RN reports pt is chemically paralyzed, tolerating no activity with poor O2 saturation. RN agreed to check back 2/5 at the earliest.     [] Spine Precautions     [] Pt being transferred to palliative care at this time. Spoke with pt/family and OT services to be defered. [] Pt independent with functional mobility and functional tasks.  Pt with no OT acute care needs at this time, will defer OT eval.    [] Other      Signature: Bibi Quiroz, S/OT

## 2018-02-02 NOTE — FLOWSHEET NOTE
In-line suctioning changed per respiratory - patient had 10-15 second pause on rhythm strip. Pulse checked; present. HR returned to 50-60s with no intervention. Rhythm strip posted in paper chart. Dr. Parris Stinson notified. Dr. Herman Oreilly (cardiology fellow) also notified.

## 2018-02-02 NOTE — PROGRESS NOTES
[facility] - Daily Progress Note  Today's Date and Time: 2/2/2018, 10:00 AM    Impression :   1. COPD  2. Pulmonary edema   3. Hypertensive emergency  4. Community acquired pneumonia  5. Acute respiratory failure  6. ARDS  7. NSTEMI  8. Rhinovirus infection    Recommendations:   · Rocephin 2 gm IV q 24 Hr. Stop date 2-5-18  · Supportive care  · Zinc supplements  · Continue droplet isolation through 2-2-18  · Procalcitonin level    Interval History:   INITIAL HISTORY:  Kimmy Willis is a 43y.o.-year-old  female who was initially admitted on 1/26/2018. Patient seen at the request of Dr. Caitlyn Cote.     Patient w/ hx of COPD on home O2 2.5L initially presented to ED for shortness of breath w/ chest pain. For past week patient has had increased cough and production of yellowish sputum w/ progressively increasing SOB along w/ feelings of fever. In ED she was found to have O2 saturation in 30s and 40s, hypertension of 187/81 and a cxr showing potential multifocal pneumonia and pulmonary edema w/ leukocytosis and increased troponins. She was given aspirin and Plavix in ED and admitted.       On admission she was placed on ceftriaxone and azithromycin for potential pneumonia and given solumedrol q8, lasix BID, and cardene for hypertension and pulmonary edema. We were consulted because the influenza PCR results are pending and patient is unable to take tamiflu PO. Inpatient team is requesting input regarding starting peramivir.       CURRENT EVALUATION: 2/2/2018     Afebrile  Patient intubated.    Continues to be hypertensive on PO meds  FiO2 requirements went up yesterday to 90% currently due to desaturation, PEEP also increased   Continues to be on ARDS protocol  Periods of asystole were noted after patient was moved, suspected to be a vagal response.      CULTURES  1/26/18 blood cx: no growth  1/26/18 rine cx: no growth  1/29/18 sputum cx: no growth    LABS  WBC

## 2018-02-02 NOTE — PROGRESS NOTES
[] pending)    VASOPRESSORS:  [x] No    [] Yes    If yes -   [] Levophed       [] Dopamine     [] Vasopressin       [] Dobutamine  [] Phenylephrine         [] Epinephrine    CENTRAL LINES:     [x] No   [] Yes   (Date of Insertion:   )           If yes -     [] Right IJ     [] Left IJ [] Right Femoral [] Left Femoral                   [] Right Subclavian [] Left Subclavian       PASTRANA'S CATHETER:   [] No   [x] Yes  (Date of Insertion: 2018  )     URINE OUTPUT:            [x] Good   [] Low              [] Anuric    Ros unable to perform due to intubation and sedation  And paralysis   OBJECTIVE:     VITAL SIGNS:  BP (!) 138/45   Pulse 100   Temp 99.9 °F (37.7 °C) (Oral)   Resp (!) 38   Ht 5' (1.524 m)   Wt (!) 337 lb 8 oz (153.1 kg)   SpO2 100%   BMI 65.91 kg/m²   Tmax over 24 hours:  Temp (24hrs), Av.7 °F (37.6 °C), Min:99.5 °F (37.5 °C), Max:99.9 °F (37.7 °C)      Patient Vitals for the past 6 hrs:   BP Temp Temp src Pulse Resp SpO2   18 1345 (!) 138/45 - - 100 (!) 38 -   18 1330 (!) 129/41 - - 100 (!) 38 -   18 1315 (!) 126/40 - - 101 (!) 38 -   18 1305 - - - 103 (!) 38 100 %   18 1245 - - - - - 98 %   18 1230 (!) 164/50 - - 105 (!) 38 96 %   18 1215 (!) 172/49 - - 104 (!) 38 96 %   18 1200 (!) 161/50 99.9 °F (37.7 °C) Oral 107 (!) 38 96 %   18 1145 (!) 161/51 - - 102 (!) 38 96 %   18 1130 (!) 160/51 - - 103 (!) 38 95 %   18 1115 (!) 159/47 - - 93 (!) 38 95 %   18 1100 (!) 159/50 - - 96 (!) 38 97 %   18 1045 (!) 158/51 - - 94 (!) 38 97 %   18 1030 (!) 146/48 - - 101 (!) 38 99 %   18 1015 (!) 198/59 - - 112 (!) 38 99 %   18 1000 (!) 195/58 - - 111 (!) 38 100 %   18 0945 (!) 192/56 - - 110 (!) 38 99 %   18 0930 (!) 195/59 - - 112 (!) 38 100 %   18 0915 (!) 199/57 - - 112 (!) 38 100 %   18 0900 (!) 198/59 - - 112 (!) 38 100 %   18 0845 Darcy Monahan ) 192/59 - - 111 (!) 38 100 %   02/02/18 0830 (!) 190/59 - - 107 (!) 38 100 %   02/02/18 0820 - - - 104 (!) 38 100 %   02/02/18 0815 - - - - - 100 %         Intake/Output Summary (Last 24 hours) at 02/02/18 1405  Last data filed at 02/02/18 1300   Gross per 24 hour   Intake          2993.02 ml   Output             2501 ml   Net           492.02 ml     Wt Readings from Last 2 Encounters:   01/31/18 (!) 337 lb 8 oz (153.1 kg)     Body mass index is 65.91 kg/m². Review of Systems -   Unable to obtain    PHYSICAL EXAMINATION:    Head and neck atraumatic, normocephalic    Lymph nodes-no cervical, supraclavicular lymphadenopathy    Neck-no JVP elevation    Lungs -rales b/l and rhonchi     CVS- S1, S2 regular. No S3 no S4, no murmurs    Abdomen-nontender, nondistended. Bowel sounds are present.   No organomegaly    Lower extremity-+edema    Upper extremity-+ edema    Neurological-paralysed no sedation holiday   Any additional physical findings:    MEDICATIONS:    Scheduled Meds:   hydrALAZINE  75 mg Oral 3 times per day    lidocaine 1 % injection  5 mL Intradermal Once    sodium chloride flush  10 mL Intravenous 2 times per day    [START ON 2/3/2018] lisinopril  10 mg Oral Daily    labetalol  100 mg Oral 3 times per day    nicotine  1 patch Transdermal Daily    enoxaparin  40 mg Subcutaneous BID    predniSONE  30 mg Oral Daily    amLODIPine  10 mg Oral Daily    famotidine  20 mg Oral BID    cefTRIAXone (ROCEPHIN) IV  2 g Intravenous Q24H    sodium chloride flush  10 mL Intravenous 2 times per day    atorvastatin  40 mg Oral Nightly    albuterol  2.5 mg Nebulization BID    ipratropium-albuterol  1 ampule Inhalation 4x daily     Continuous Infusions:   niCARdipine 2.5 mg/hr (02/02/18 1403)    cisatracurium (NIMBEX) infusion 1.5 mcg/kg/min (02/01/18 2239)    sodium chloride 10 mL/hr at 02/01/18 1935    dextrose      propofol 30 mcg/kg/min (02/02/18 1115)     PRN Meds:     hydrALAZINE 20 mg Q1H PRN 02/02/18   0440   WBC  15.3*  18.0*   HGB  9.4*  9.7*   HCT  32.7*  33.2*   PLT  237  219        Last 3 Blood Glucose:   Recent Labs      02/01/18   0853  02/02/18   0043  02/02/18   0346   GLUCOSE  125*  116*  133*        PT/INR:    Lab Results   Component Value Date    PROTIME 11.0 01/26/2018    INR 1.0 01/26/2018     PTT:    Lab Results   Component Value Date    APTT 30.2 01/27/2018       Comprehensive Metabolic Profile:   Recent Labs      02/01/18   0853  02/01/18   1802  02/02/18   0043  02/02/18   0346   NA  144   --   140  144   K  3.0*  4.8  4.2  4.3   CL  102   --   99  102   CO2  32*   --   29  28   BUN  32*   --   34*  33*   CREATININE  0.88   --   0.73  0.70   GLUCOSE  125*   --   116*  133*   CALCIUM  8.8   --   8.1*  8.3*      Magnesium:   Lab Results   Component Value Date    MG 2.2 02/02/2018     Phosphorus:   Lab Results   Component Value Date    PHOS 3.7 02/02/2018     Ionized Calcium:   Lab Results   Component Value Date    CAION 1.22 02/02/2018        Radiology/Imaging:     Chest Xray (2/2/2018): Stable exam    1/27/2018 echocardiogram  CONCLUSIONS    Summary  Technically difficult study. All segments not well seen. No comment can be  made regarding specific wall motion. LV chamber dimension is normal. Systolic function appears to be hyperdynamic  with an estimated EF of 65%. Right ventricular dilatation with normal systolic function. No significant valvular regurgitation or stenosis seen. ASSESSMENT:     Principal Problem:    ARDS (adult respiratory distress syndrome) ([facility])  Active Problems:    Pneumonia    NSTEMI (non-ST elevated myocardial infarction) ([facility] [address]    Pulmonary edema    Hypertensive emergency    Acute respiratory failure ([facility])    Smoker    Morbid obesity ([facility])    Elevated troponin    Obesity hypoventilation syndrome ([facility])    SOB (shortness of breath)    COPD exacerbation ([facility] [address]     Patient presented with COPD exacerbation and possible pneumonia requiring BiPAP.   Patient was admitted to stepdown when she developed tachypnea and registered distress she was transferred to ICU she was high risk for intubation and required critical care. ICU course has been complicated by acute rest or distress syndrome. Patient is currently intubated and ventilated requirin high Plateau and peak pressures likely related to poor compliance due to ARDS, in addition to obesity. Patient developed vent asynchrony and worsening oxygenation and was started on Nimbex. Repeat ABG show CO2 retention and acidemia. Patient continues to have significant hypertension especially after propofol was decreased. PLAN:     WEAN PER PROTOCOL:  [] No   [x] Yes  [] N/A    DISCONTINUE ANY LABS:   [x] No   [] Yes    ICU PROPHYLAXIS:  Stress ulcer:  [] PPI Agent  [x] [id number] [] Sucralfate  [] Other:  VTE:   [] Enoxaparin  [] Unfract. Heparin Subcut  [] EPC Cuffs    NUTRITION:  [] NPO [] Tube Feeding (Specify: ) [] TPN  [x] PO (Diet: Diet Tube Feed Continuous/Cyclic w/ Diet)    HOME MEDICATIONS RECONCILED: [] No  [x] Yes    INSULIN DRIP:   [x] No   [] Yes    CONSULTATION NEEDED:  [] No   [x] Yes    FAMILY UPDATED:    [] No   [x] Yes    TRANSFER OUT OF ICU:   [x] No   [] Yes    ADDITIONAL PLAN:      Hypertensive emergency-persisting  Wean to stop cardene gtt. SBP Goal 140-160. Continue Norvasc 10 mg. Start Labetalol 100 mg tid if OK with cardiology  Increase Hydralazine to 75 mg tid and Lisinopril 10 mg. .      Acute respiratory failure from Community acquired pneumonia. Rocephin 2 g IV q24. ID following. Rhinovirus positive. D/c droplet precaution     Acute respiratory failure on ARDS protocol  Continue Vent on assist-control  Tidal Volume (TV) 6 to 8 mL/kg  Keep plateau pressure (Pplat) 25 - 30 cm H20  Adjust FiO2 and PEEP to keep PaO2 55 - 80   Duoneb/Dulera. Prednisone 30 mg daily   Will follow up with chest xray     Troponemia initially present and Ischemia workup once extubated. HIEN resolved. Avoid diuretics. Smoker on nicotine patch. DVT Prophylaxis. Heparin sc  GI Prophylaxis Pepcid 20 mg bid    Therese Rodriguez MD      Department of Internal Medicine  [facility]         2/2/2018, 2:05 PM   Attending Physician Statement  I have discussed the care of Kimmy Willis, including pertinent history and exam findings,  with the resident. I have seen and examined the patient and the key elements of all parts of the encounter have been performed by me. I agree with the assessment, plan and orders as documented by the resident with additions . Start Karly -   May need HFOV if plt pressure continues to be >30 - attempt to dec tv to 6 ml/kg pbw   Her bradycardia and pauses were related to laying flat and suction - if ok with cardiolofy cont labetalol   Avoid supine position   Increase lisinopril   D/w sister over phone and TACOS at bed side   Critical   Prognosis guarded   Total critical care time caring for this patient with life threatening, unstable organ failure, including direct patient contact, management of life support systems, review of data including imaging and labs, discussions with other team members and physicians at least 36 Min so far today, excluding procedures. Treatment plan Discussed with nursing staff in detail , all questions answered . Tylor Hussein MD   2/2/2018   3:22 PM    Please note that this chart was generated using voice recognition Dragon dictation software. Although every effort was made to ensure the accuracy of this automated transcription, some errors in transcription may have occurred.

## 2018-02-03 ENCOUNTER — APPOINTMENT (OUTPATIENT)
Dept: GENERAL RADIOLOGY | Age: 43
DRG: 005 | End: 2018-02-03
Payer: MEDICARE

## 2018-02-03 PROBLEM — J81.0 ACUTE PULMONARY EDEMA (HCC): Status: ACTIVE | Noted: 2018-01-27

## 2018-02-03 PROBLEM — J96.02 ACUTE RESPIRATORY FAILURE WITH HYPOXIA AND HYPERCAPNIA (HCC): Status: ACTIVE | Noted: 2018-01-27

## 2018-02-03 PROBLEM — J96.01 ACUTE RESPIRATORY FAILURE WITH HYPOXIA AND HYPERCAPNIA (HCC): Status: ACTIVE | Noted: 2018-01-27

## 2018-02-03 LAB
ALLEN TEST: ABNORMAL
ALLEN TEST: POSITIVE
ANION GAP SERPL CALCULATED.3IONS-SCNC: 11 MMOL/L (ref 9–17)
BUN BLDV-MCNC: 38 MG/DL (ref 6–20)
BUN/CREAT BLD: ABNORMAL (ref 9–20)
CALCIUM SERPL-MCNC: 8.3 MG/DL (ref 8.6–10.4)
CHLORIDE BLD-SCNC: 108 MMOL/L (ref 98–107)
CO2: 30 MMOL/L (ref 20–31)
CREAT SERPL-MCNC: 0.91 MG/DL (ref 0.5–0.9)
FIO2: 70
FIO2: 80
GFR AFRICAN AMERICAN: >60 ML/MIN
GFR NON-AFRICAN AMERICAN: >60 ML/MIN
GFR SERPL CREATININE-BSD FRML MDRD: ABNORMAL ML/MIN/{1.73_M2}
GFR SERPL CREATININE-BSD FRML MDRD: ABNORMAL ML/MIN/{1.73_M2}
GLUCOSE BLD-MCNC: 168 MG/DL (ref 74–100)
GLUCOSE BLD-MCNC: 169 MG/DL (ref 70–99)
GLUCOSE BLD-MCNC: 170 MG/DL (ref 65–105)
GLUCOSE BLD-MCNC: 182 MG/DL (ref 74–100)
METHEMOGLOBIN: 0.2 % (ref 0–1.5)
MODE: ABNORMAL
NEGATIVE BASE EXCESS, ART: ABNORMAL (ref 0–2)
O2 DEVICE/FLOW/%: ABNORMAL
PATIENT TEMP: 37.3
PATIENT TEMP: ABNORMAL
POC HCO3: 33.6 MMOL/L (ref 21–28)
POC HCO3: 34.6 MMOL/L (ref 21–28)
POC HCO3: 34.6 MMOL/L (ref 21–28)
POC HCO3: 35 MMOL/L (ref 21–28)
POC HCO3: 35.3 MMOL/L (ref 21–28)
POC O2 SATURATION: 88 % (ref 94–98)
POC O2 SATURATION: 90 % (ref 94–98)
POC O2 SATURATION: 95 % (ref 94–98)
POC O2 SATURATION: 95 % (ref 94–98)
POC O2 SATURATION: 96 % (ref 94–98)
POC PCO2 TEMP: 70 MM HG
POC PCO2 TEMP: ABNORMAL MM HG
POC PCO2: 45.3 MM HG (ref 35–48)
POC PCO2: 45.3 MM HG (ref 35–48)
POC PCO2: 49.6 MM HG (ref 35–48)
POC PCO2: 52.3 MM HG (ref 35–48)
POC PCO2: 68.8 MM HG (ref 35–48)
POC PH TEMP: 7.31
POC PH TEMP: ABNORMAL
POC PH: 7.31 (ref 7.35–7.45)
POC PH: 7.44 (ref 7.35–7.45)
POC PH: 7.45 (ref 7.35–7.45)
POC PH: 7.48 (ref 7.35–7.45)
POC PH: 7.5 (ref 7.35–7.45)
POC PO2 TEMP: 68 MM HG
POC PO2 TEMP: ABNORMAL MM HG
POC PO2: 53.9 MM HG (ref 83–108)
POC PO2: 66.4 MM HG (ref 83–108)
POC PO2: 69.3 MM HG (ref 83–108)
POC PO2: 73.3 MM HG (ref 83–108)
POC PO2: 78.1 MM HG (ref 83–108)
POSITIVE BASE EXCESS, ART: 10 (ref 0–3)
POSITIVE BASE EXCESS, ART: 11 (ref 0–3)
POSITIVE BASE EXCESS, ART: 6 (ref 0–3)
POSITIVE BASE EXCESS, ART: 9 (ref 0–3)
POSITIVE BASE EXCESS, ART: 9 (ref 0–3)
POTASSIUM SERPL-SCNC: 4.4 MMOL/L (ref 3.7–5.3)
SAMPLE SITE: ABNORMAL
SODIUM BLD-SCNC: 149 MMOL/L (ref 135–144)
TCO2 (CALC), ART: 35 MMOL/L (ref 22–29)
TCO2 (CALC), ART: 36 MMOL/L (ref 22–29)
TCO2 (CALC), ART: 36 MMOL/L (ref 22–29)
TCO2 (CALC), ART: 37 MMOL/L (ref 22–29)
TCO2 (CALC), ART: 37 MMOL/L (ref 22–29)

## 2018-02-03 PROCEDURE — 83050 HGB METHEMOGLOBIN QUAN: CPT

## 2018-02-03 PROCEDURE — 94640 AIRWAY INHALATION TREATMENT: CPT

## 2018-02-03 PROCEDURE — 2500000003 HC RX 250 WO HCPCS: Performed by: INTERNAL MEDICINE

## 2018-02-03 PROCEDURE — 71045 X-RAY EXAM CHEST 1 VIEW: CPT

## 2018-02-03 PROCEDURE — 6370000000 HC RX 637 (ALT 250 FOR IP): Performed by: INTERNAL MEDICINE

## 2018-02-03 PROCEDURE — 6370000000 HC RX 637 (ALT 250 FOR IP): Performed by: STUDENT IN AN ORGANIZED HEALTH CARE EDUCATION/TRAINING PROGRAM

## 2018-02-03 PROCEDURE — 36415 COLL VENOUS BLD VENIPUNCTURE: CPT

## 2018-02-03 PROCEDURE — 6360000002 HC RX W HCPCS: Performed by: INTERNAL MEDICINE

## 2018-02-03 PROCEDURE — 36620 INSERTION CATHETER ARTERY: CPT

## 2018-02-03 PROCEDURE — 87205 SMEAR GRAM STAIN: CPT

## 2018-02-03 PROCEDURE — 94762 N-INVAS EAR/PLS OXIMTRY CONT: CPT

## 2018-02-03 PROCEDURE — 6370000000 HC RX 637 (ALT 250 FOR IP): Performed by: EMERGENCY MEDICINE

## 2018-02-03 PROCEDURE — 94003 VENT MGMT INPAT SUBQ DAY: CPT

## 2018-02-03 PROCEDURE — 2580000003 HC RX 258: Performed by: INTERNAL MEDICINE

## 2018-02-03 PROCEDURE — 6370000000 HC RX 637 (ALT 250 FOR IP): Performed by: HOSPITALIST

## 2018-02-03 PROCEDURE — 82947 ASSAY GLUCOSE BLOOD QUANT: CPT

## 2018-02-03 PROCEDURE — 2720000010 HC SURG SUPPLY STERILE

## 2018-02-03 PROCEDURE — 94770 HC ETCO2 MONITOR DAILY: CPT

## 2018-02-03 PROCEDURE — 2580000003 HC RX 258: Performed by: STUDENT IN AN ORGANIZED HEALTH CARE EDUCATION/TRAINING PROGRAM

## 2018-02-03 PROCEDURE — 4100000001 HC NITRIC OX DAILY

## 2018-02-03 PROCEDURE — 82803 BLOOD GASES ANY COMBINATION: CPT

## 2018-02-03 PROCEDURE — 2000000000 HC ICU R&B

## 2018-02-03 PROCEDURE — 6360000002 HC RX W HCPCS: Performed by: STUDENT IN AN ORGANIZED HEALTH CARE EDUCATION/TRAINING PROGRAM

## 2018-02-03 PROCEDURE — 80048 BASIC METABOLIC PNL TOTAL CA: CPT

## 2018-02-03 PROCEDURE — 87086 URINE CULTURE/COLONY COUNT: CPT

## 2018-02-03 PROCEDURE — 87040 BLOOD CULTURE FOR BACTERIA: CPT

## 2018-02-03 PROCEDURE — 99291 CRITICAL CARE FIRST HOUR: CPT | Performed by: INTERNAL MEDICINE

## 2018-02-03 PROCEDURE — 87070 CULTURE OTHR SPECIMN AEROBIC: CPT

## 2018-02-03 PROCEDURE — 36600 WITHDRAWAL OF ARTERIAL BLOOD: CPT

## 2018-02-03 PROCEDURE — 99233 SBSQ HOSP IP/OBS HIGH 50: CPT | Performed by: INTERNAL MEDICINE

## 2018-02-03 RX ORDER — HEPARIN SODIUM 5000 [USP'U]/ML
5000 INJECTION, SOLUTION INTRAVENOUS; SUBCUTANEOUS EVERY 8 HOURS SCHEDULED
Status: DISCONTINUED | OUTPATIENT
Start: 2018-02-03 | End: 2018-02-16

## 2018-02-03 RX ORDER — CLONIDINE HYDROCHLORIDE 0.1 MG/1
0.1 TABLET ORAL EVERY 6 HOURS
Status: DISCONTINUED | OUTPATIENT
Start: 2018-02-03 | End: 2018-02-04

## 2018-02-03 RX ORDER — HYDRALAZINE HYDROCHLORIDE 50 MG/1
50 TABLET, FILM COATED ORAL EVERY 8 HOURS SCHEDULED
Status: DISCONTINUED | OUTPATIENT
Start: 2018-02-03 | End: 2018-02-03

## 2018-02-03 RX ORDER — LISINOPRIL 10 MG/1
10 TABLET ORAL DAILY
Status: DISCONTINUED | OUTPATIENT
Start: 2018-02-04 | End: 2018-02-03

## 2018-02-03 RX ORDER — LISINOPRIL 20 MG/1
20 TABLET ORAL DAILY
Status: DISCONTINUED | OUTPATIENT
Start: 2018-02-03 | End: 2018-02-03

## 2018-02-03 RX ORDER — HYDRALAZINE HYDROCHLORIDE 20 MG/ML
20 INJECTION INTRAMUSCULAR; INTRAVENOUS EVERY 4 HOURS PRN
Status: DISCONTINUED | OUTPATIENT
Start: 2018-02-03 | End: 2018-02-07

## 2018-02-03 RX ORDER — HYDRALAZINE HYDROCHLORIDE 20 MG/ML
10 INJECTION INTRAMUSCULAR; INTRAVENOUS EVERY 6 HOURS PRN
Status: DISCONTINUED | OUTPATIENT
Start: 2018-02-03 | End: 2018-02-03

## 2018-02-03 RX ORDER — LISINOPRIL 10 MG/1
10 TABLET ORAL DAILY
Status: DISCONTINUED | OUTPATIENT
Start: 2018-02-03 | End: 2018-02-04

## 2018-02-03 RX ORDER — SODIUM CHLORIDE 9 MG/ML
INJECTION, SOLUTION INTRAVENOUS CONTINUOUS
Status: DISCONTINUED | OUTPATIENT
Start: 2018-02-03 | End: 2018-02-03

## 2018-02-03 RX ORDER — ATROPINE SULFATE 0.4 MG/ML
0.4 AMPUL (ML) INJECTION ONCE
Status: DISCONTINUED | OUTPATIENT
Start: 2018-02-03 | End: 2018-02-11

## 2018-02-03 RX ORDER — LISINOPRIL 10 MG/1
10 TABLET ORAL ONCE
Status: COMPLETED | OUTPATIENT
Start: 2018-02-03 | End: 2018-02-03

## 2018-02-03 RX ORDER — HYDRALAZINE HYDROCHLORIDE 50 MG/1
100 TABLET, FILM COATED ORAL EVERY 8 HOURS SCHEDULED
Status: DISCONTINUED | OUTPATIENT
Start: 2018-02-03 | End: 2018-02-03

## 2018-02-03 RX ORDER — NALOXONE HYDROCHLORIDE 0.4 MG/ML
0.4 INJECTION, SOLUTION INTRAMUSCULAR; INTRAVENOUS; SUBCUTANEOUS PRN
Status: DISCONTINUED | OUTPATIENT
Start: 2018-02-03 | End: 2018-02-23 | Stop reason: HOSPADM

## 2018-02-03 RX ADMIN — FENTANYL CITRATE 100 MCG: 50 INJECTION INTRAMUSCULAR; INTRAVENOUS at 01:41

## 2018-02-03 RX ADMIN — PREDNISONE 30 MG: 20 TABLET ORAL at 08:29

## 2018-02-03 RX ADMIN — HYDRALAZINE HYDROCHLORIDE 10 MG: 20 INJECTION INTRAMUSCULAR; INTRAVENOUS at 15:03

## 2018-02-03 RX ADMIN — FENTANYL CITRATE 100 MCG: 50 INJECTION INTRAMUSCULAR; INTRAVENOUS at 16:33

## 2018-02-03 RX ADMIN — INSULIN LISPRO 1 UNITS: 100 INJECTION, SOLUTION INTRAVENOUS; SUBCUTANEOUS at 18:19

## 2018-02-03 RX ADMIN — PROPOFOL 35 MCG/KG/MIN: 10 INJECTION, EMULSION INTRAVENOUS at 07:46

## 2018-02-03 RX ADMIN — PROPOFOL 35 MCG/KG/MIN: 10 INJECTION, EMULSION INTRAVENOUS at 01:14

## 2018-02-03 RX ADMIN — FENTANYL CITRATE 100 MCG: 50 INJECTION INTRAMUSCULAR; INTRAVENOUS at 14:13

## 2018-02-03 RX ADMIN — PROPOFOL 50 MCG/KG/MIN: 10 INJECTION, EMULSION INTRAVENOUS at 21:51

## 2018-02-03 RX ADMIN — SODIUM CHLORIDE 5 MG/HR: 9 INJECTION, SOLUTION INTRAVENOUS at 15:30

## 2018-02-03 RX ADMIN — PROPOFOL 50 MCG/KG/MIN: 10 INJECTION, EMULSION INTRAVENOUS at 17:44

## 2018-02-03 RX ADMIN — SODIUM CHLORIDE: 9 INJECTION, SOLUTION INTRAVENOUS at 09:15

## 2018-02-03 RX ADMIN — SODIUM CHLORIDE 15 MG/HR: 9 INJECTION, SOLUTION INTRAVENOUS at 01:08

## 2018-02-03 RX ADMIN — FENTANYL CITRATE 100 MCG: 50 INJECTION INTRAMUSCULAR; INTRAVENOUS at 12:18

## 2018-02-03 RX ADMIN — FAMOTIDINE 20 MG: 20 TABLET, FILM COATED ORAL at 08:29

## 2018-02-03 RX ADMIN — DESMOPRESSIN ACETATE 40 MG: 0.2 TABLET ORAL at 20:33

## 2018-02-03 RX ADMIN — HYDRALAZINE HYDROCHLORIDE 75 MG: 50 TABLET, FILM COATED ORAL at 16:33

## 2018-02-03 RX ADMIN — ALBUTEROL SULFATE 2.5 MG: 2.5 SOLUTION RESPIRATORY (INHALATION) at 03:26

## 2018-02-03 RX ADMIN — CISATRACURIUM BESYLATE 1.5 MCG/KG/MIN: 10 INJECTION INTRAVENOUS at 06:34

## 2018-02-03 RX ADMIN — ACETAMINOPHEN 650 MG: 325 TABLET ORAL at 13:17

## 2018-02-03 RX ADMIN — Medication 10 ML: at 08:30

## 2018-02-03 RX ADMIN — HEPARIN SODIUM 5000 UNITS: 5000 INJECTION, SOLUTION INTRAVENOUS; SUBCUTANEOUS at 21:52

## 2018-02-03 RX ADMIN — HEPARIN SODIUM 5000 UNITS: 5000 INJECTION, SOLUTION INTRAVENOUS; SUBCUTANEOUS at 10:26

## 2018-02-03 RX ADMIN — FENTANYL CITRATE 100 MCG: 50 INJECTION INTRAMUSCULAR; INTRAVENOUS at 15:12

## 2018-02-03 RX ADMIN — HYDRALAZINE HYDROCHLORIDE 20 MG: 20 INJECTION INTRAMUSCULAR; INTRAVENOUS at 20:11

## 2018-02-03 RX ADMIN — FENTANYL CITRATE 100 MCG: 50 INJECTION INTRAMUSCULAR; INTRAVENOUS at 04:31

## 2018-02-03 RX ADMIN — CISATRACURIUM BESYLATE 2.5 MCG/KG/MIN: 10 INJECTION INTRAVENOUS at 18:19

## 2018-02-03 RX ADMIN — SODIUM CHLORIDE 5 MG/HR: 9 INJECTION, SOLUTION INTRAVENOUS at 08:40

## 2018-02-03 RX ADMIN — PROPOFOL 30 MCG/KG/MIN: 10 INJECTION, EMULSION INTRAVENOUS at 14:28

## 2018-02-03 RX ADMIN — CLONIDINE HYDROCHLORIDE 0.1 MG: 0.1 TABLET ORAL at 20:23

## 2018-02-03 RX ADMIN — PROPOFOL 50 MCG/KG/MIN: 10 INJECTION, EMULSION INTRAVENOUS at 19:23

## 2018-02-03 RX ADMIN — AMLODIPINE BESYLATE 10 MG: 10 TABLET ORAL at 08:29

## 2018-02-03 RX ADMIN — Medication 75 MCG/HR: at 17:59

## 2018-02-03 RX ADMIN — FAMOTIDINE 20 MG: 20 TABLET, FILM COATED ORAL at 20:33

## 2018-02-03 RX ADMIN — HYDRALAZINE HYDROCHLORIDE 75 MG: 50 TABLET, FILM COATED ORAL at 21:52

## 2018-02-03 RX ADMIN — WATER 2 G: 1 INJECTION INTRAMUSCULAR; INTRAVENOUS; SUBCUTANEOUS at 14:15

## 2018-02-03 RX ADMIN — LISINOPRIL 10 MG: 10 TABLET ORAL at 15:30

## 2018-02-03 RX ADMIN — PROPOFOL 35 MCG/KG/MIN: 10 INJECTION, EMULSION INTRAVENOUS at 04:59

## 2018-02-03 RX ADMIN — HYDRALAZINE HYDROCHLORIDE 75 MG: 50 TABLET, FILM COATED ORAL at 06:36

## 2018-02-03 RX ADMIN — SODIUM CHLORIDE 15 MG/HR: 9 INJECTION, SOLUTION INTRAVENOUS at 04:57

## 2018-02-03 RX ADMIN — PROPOFOL 25 MCG/KG/MIN: 10 INJECTION, EMULSION INTRAVENOUS at 11:19

## 2018-02-03 RX ADMIN — INSULIN LISPRO 1 UNITS: 100 INJECTION, SOLUTION INTRAVENOUS; SUBCUTANEOUS at 12:04

## 2018-02-03 RX ADMIN — IPRATROPIUM BROMIDE AND ALBUTEROL SULFATE 1 AMPULE: .5; 3 SOLUTION RESPIRATORY (INHALATION) at 08:10

## 2018-02-03 RX ADMIN — FENTANYL CITRATE 100 MCG: 50 INJECTION INTRAMUSCULAR; INTRAVENOUS at 10:36

## 2018-02-03 ASSESSMENT — PULMONARY FUNCTION TESTS
PIF_VALUE: 39
PIF_VALUE: 38

## 2018-02-03 NOTE — PROGRESS NOTES
triggers of her vagal reactions. 3. BP control    Electronically signed by Chris Danielle MD on 2/3/2018 at 10:49 [address] Card[facility]  [phone]  .

## 2018-02-03 NOTE — PLAN OF CARE
Problem: RESPIRATORY  Intervention: Respiratory assessment  BRONCHOSPASM/BRONCHOCONSTRICTION     [x]         IMPROVE AERATION/BREATH SOUNDS  [x]   ADMINISTER BRONCHODILATOR THERAPY AS APPROPRIATE  [x]   ASSESS BREATH SOUNDS  [x]   IMPLEMENT AEROSOL/MDI PROTOCOL  [x]   PATIENT EDUCATION AS NEEDED          Problem: OXYGENATION/RESPIRATORY FUNCTION  Goal: Patient will maintain patent airway  Outcome: Ongoing    Goal: Patient will achieve/maintain normal respiratory rate/effort  Respiratory rate and effort will be within normal limits for the patient   Outcome: Ongoing      Problem: MECHANICAL VENTILATION  Goal: Patient will maintain patent airway  Outcome: Ongoing    Goal: Oral health is maintained or improved  Outcome: Ongoing    Goal: ET tube will be managed safely  Outcome: Ongoing    Goal: Ability to express needs and understand communication  Outcome: Ongoing    Goal: Mobility/activity is maintained at optimum level for patient  Outcome: Ongoing      Problem: SKIN INTEGRITY  Goal: Skin integrity is maintained or improved  Outcome: Ongoing

## 2018-02-03 NOTE — PROGRESS NOTES
Critical Care Team - Daily Progress Note      Date and time: 2/3/2018 7:46 AM  Patient's name:  Daisy Qurioz  Medical Record Number: [id number]  Patient's account/billing number: [de-identified]  Patient's YOB: 1975  Age: 43 y.o. Date of Admission: 1/26/2018  8:09 PM  Length of stay during current admission: 8      Primary Care Physician: Lord Pallas  ICU Attending Physician: Dr. Juan Jose Sharma MD    Code Status: Full Code    Reason for ICU admission: SOB, Pneumonia, Hypertensive Emergency. SUBJECTIVE:   Patient presented with COPD exacerbation and possible pneumonia requiring BiPAP. Patient was admitted to stepdown when she developed tachypnea and registered distress she was transferred to ICU she was high risk for intubation and required critical care. ICU course has been complicated by acute respiratory distress syndrome. Patient is currently intubated and ventilated requirin high Plateau and peak pressures likely related to poor compliance due to ARDS, in addition to obesity. Patient developed vent asynchrony and worsening oxygenation and was started on Nimbex. Repeat ABG show CO2 retention and acidemia. Patient continues to have significant hypertension especially after propofol was decreased and has been requiring Cardene in addition to Norvasc/Lisinopril/Hydralyzine    OVERNIGHT EVENTS:            Remain on high plateau pressure greater than 30, PEEP 14. Saturating in the 80's  Was on NO without improvement, plans to initiate oscillator this morning  BP remain elevated, will increase oral antihypertensive and HOLD Beta blockers for asystole. Arterial line to be inserted this am for better BP monitoring  Patient is currently receiving Nimbex drip as she had vent dyssynchrony   She remain on propofol .    Over night events noted-few pulses less than 2-3 seconds, normal hemodynamics throughout      AWAKE & FOLLOWING COMMANDS:  [x] No   [] Yes    CURRENT VENTILATION dextrose 100 mL/hr PRN   fentanNYL 50 mcg Q1H PRN   Or     fentanNYL 100 mcg Q1H PRN   lidocaine viscous 5 mL PRN   magnesium hydroxide 30 mL Daily PRN   ondansetron 4 mg Q6H PRN   potassium chloride 40 mEq PRN   Or     potassium chloride 40 mEq PRN   Or     potassium chloride 10 mEq PRN   sodium chloride flush 10 mL PRN   acetaminophen 650 mg Q4H PRN         VENT SETTINGS (Comprehensive) (if applicable):  Vent Information  Ventilation Day(s): 5  Vent Type: Servo i  Vent Mode: PRVC/AC  Vt Ordered: 320 mL  Vt Exhaled: 324 mL  Pressure Ordered: 10  Rate Set: 38 bmp  Rate Measured: 37 br/min  Minute Volume: 12.1 Liters  Pressure Support: 10 cmH20  FiO2 : 70 %  Peak Inspiratory Pressure: 38 cmH2O  I:E Ratio: 1:1.82  Sensitivity: 5  PEEP/CPAP: (S) 12  I Time/ I Time %: 0.55 s  Mean Airway Pressure: 22.5 cmH20  Plateau Pressure: 35 cmH20  Static Compliance: 16 mL/cmH2O  Dynamic Compliance: 13 mL/cmH2O  Total PEEP: 15 cmH20  Auto PEEP: 1 cmH20  Nitric Oxide/Epoprostenol In Use?: Yes  NO Set: 20  NO Analyzed: 21 ppm  NO2 Analyzed: 0.4 ppm  Additional Respiratory  Assessments  Pulse: 84  Resp: (!) 38  SpO2: 95 %  End Tidal CO2: 69 (%)  Position: Semi-Blakely's  Humidification Temp: 37  HME (Heat moisture exchanger): No  Circuit Condensation: Drained  Oral Care Completed?: Yes  Oral Care: Mouth swabbed, Mouth moisturizer, Mouth suctioned  Subglottic Suction Done?: Yes    ABGs:   Updated: 02/02/18 0540     POC pH 7.359    POC pCO2 58.0 (H) mm Hg    POC PO2 54.7 (L) mm Hg    POC HCO3 32.7 (H) mmol/L    TCO2 (calc), Art 35 (H) mmol/L    Negative Base Excess, Art NOT REPORTED    Positive Base Excess, Art 6 (H)    POC O2 SAT 86 (L) %    O2 Device/Flow/% Adult Ventilator    Aubrey Test POSITIVE    Sample Site Left Radial Artery    Mode PRVC    FIO2 100.0       Laboratory findings:    Complete Blood Count:   Recent Labs      02/01/18   0853  02/02/18   0440   WBC  15.3*  18.0*   HGB  9.4*  9.7*   HCT  32.7*  33.2*   PLT  237  219 nitric oxide and high frequency oscillator was ordered yesterday and was started this morning, I have seen the chest x-ray before and after the oscillator was started she is mild hyperinflation on the right side and ABG on high frequency oscillator ventilation showed a pH off 743 PCO2 52 PO2 was 54 and FiO2 was increased to 80% and lipid blood gas repeated shows pH of 7.45 PCO2 49 and PO2 of 78 and FiO2 was decreased to 70% again. Her endotracheal secretions are small and she is afebrile WC count is 18 with a hemoglobin of 9.7  Her sodium is mildly elevated to 149 and will give her free water. We will restart currently feeding. Will continue with high frequency oscillator ventilation. Will continue with inhale nitric oxide and slowly once PO2 is is stable we will wean inhaler to oxide. Continue Rocephin per infectious disease here  Follow up urine output and renal function and she may need intermittent Lasix but will follow up sodium and would not give Lasix today  Discussed with nursing staff and respiratory therapist  Total critical care time caring for this patient with life threatening, unstable organ failure, including direct patient contact, management of life support systems, review of data including imaging and labs, discussions with other team members and physicians at least 27  Min so far today, excluding procedures.       Srikanth James MD  2/3/2018 11:29 AM

## 2018-02-03 NOTE — PLAN OF CARE
Problem: RESPIRATORY  Intervention: Respiratory assessment  BRONCHOSPASM/BRONCHOCONSTRICTION     [x]         IMPROVE AERATION/BREATH SOUNDS  [x]   ADMINISTER BRONCHODILATOR THERAPY AS APPROPRIATE  [x]   ASSESS BREATH SOUNDS  [x]   IMPLEMENT AEROSOL/MDI PROTOCOL  []   PATIENT EDUCATION AS NEEDED          Problem: OXYGENATION/RESPIRATORY FUNCTION  Goal: Patient will maintain patent airway  Outcome: Ongoing    Goal: Patient will achieve/maintain normal respiratory rate/effort  Respiratory rate and effort will be within normal limits for the patient   Outcome: Ongoing      Problem: MECHANICAL VENTILATION  Goal: Patient will maintain patent airway  Outcome: Ongoing    Goal: Oral health is maintained or improved  Outcome: Ongoing    Goal: ET tube will be managed safely  Outcome: Ongoing    Goal: Ability to express needs and understand communication  Outcome: Ongoing    Goal: Mobility/activity is maintained at optimum level for patient  Outcome: Ongoing      Problem: SKIN INTEGRITY  Goal: Skin integrity is maintained or improved  Outcome: Ongoing

## 2018-02-03 NOTE — PROGRESS NOTES
Insert Arterial Line  Date/Time:  02/03/18, 8:04 AM  Performed by: Deysi Chino    Patient identity confirmed: arm band and provided demographic data   Time out: Immediately prior to procedure a \"time out\" was called to verify the correct patient, procedure, equipment, support staff. Preparation: Patient was prepped and draped in the usual sterile fashion.     Location:left radial    Aubrey's test normal: yes  Needle gauge: 20     Number of attempts: 1  Post-procedure: transparent dressing applied and line secured    Patient tolerance: well

## 2018-02-03 NOTE — PROGRESS NOTES
[facility] - Initial Consult Note  Today's Date and Time: 2/3/2018, 3:39 PM  admission date 1/26/2018  Impression :   Current:  ·   · Community acquired pneumonia  · ARDS  · Rhinovirus infection  · Pro calcitonin mild elevation, 0.85  · Leukocytosis . Steroids induced  · Fever  · Cardiac pauses, possibly vasovagal    Other:  · Pulmonary edema  · Hypertensive crisis  Discussion    · History of COPD and obesity, presented with shortness of breath, acute synovitis infection and pneumonia. Clinically ARDS. Isolated until 2/2 for rhinovirus. · Continues on ceftriaxone at this time for chronic acquired pneumonia  · 2/3 fever, unexplained, and culture will be pending, at risk for C. diff  Recommendations   · Ceftriaxone 2 g a day until 2/5/18- maintain it at this time pending repeat urine and blood cultures after a fever  · Continue isolation  · Discussed with nurse on the bedside    Infection Control Recommendations   · Universal Precautions  · Droplet isolation    Antimicrobial Stewardship Recommendations   · Targeted therapy  Chief complaint/reason for consultation:   Shortening of breath/pneumonia    History of Present Illness:   Interval changes 02/03/18   Low-grade fever today, a pressure stable, remains on the nitric oxide with oscillator. FiO2 69 and oscillator 19, PPM , improved from 20 PPM  Does not have any cough reflex. The sputum small white  Obese abdomen. Seems to be soft. Lungs are very coarse underdosing later  No skin rash, she has diarrhea with in FMS  40 in place with urine clear  Multiple cardiac pauses. After suctioning, possibly vasovagal reflex.   Cardiology on board  No positive culture  Otherwise        Summary of relevant labs:  Labs:  WBC 18  Micro:  Blood culture 2/3, pending  Urine culture 2/3, pending  Imaging:  Chest x-ray, 2/3/18  bilateral infiltrates suggestive of ARDS, similar to before,    I have personally reviewed the past medical history, past surgical history, medications, social history, and family history, and I have updated the database accordingly. Past Medical History:     Past Medical History:   Diagnosis Date    COPD (chronic obstructive pulmonary disease) ([facility] [address]        Past Surgical  History:     Past Surgical History:   Procedure Laterality Date     CATH POWER PICC TRIPLE  2/2/2018            Medications:      heparin (porcine)  5,000 Units Subcutaneous 3 times per day    [START ON 2/4/2018] lisinopril  10 mg Oral Daily    insulin lispro  0-6 Units Subcutaneous 4 times per day    lisinopril  10 mg Oral Once    lidocaine 1 % injection  5 mL Intradermal Once    sodium chloride flush  10 mL Intravenous 2 times per day    nicotine  1 patch Transdermal Daily    amLODIPine  10 mg Oral Daily    famotidine  20 mg Oral BID    cefTRIAXone (ROCEPHIN) IV  2 g Intravenous Q24H    sodium chloride flush  10 mL Intravenous 2 times per day    atorvastatin  40 mg Oral Nightly       Social History:     Social History     Social History    Marital status: Single     Spouse name: N/A    Number of children: N/A    Years of education: N/A     Occupational History    Not on file. Social History Main Topics    Smoking status: Heavy Tobacco Smoker     Packs/day: 0.50    Smokeless tobacco: Never Used    Alcohol use Not on file    Drug use: No    Sexual activity: Not on file     Other Topics Concern    Not on file     Social History Narrative    No narrative on file       Family History:   History reviewed. No pertinent family history.      Allergies:   Asa [aspirin] and Sulfa antibiotics     Review of Systems:     Review of Systems   Unable to perform ROS: Intubated       Physical Examination :   Patient Vitals for the past 8 hrs:   BP Temp Temp src Pulse Resp SpO2   02/03/18 1530 - - - 92 14 -   02/03/18 1515 - - - 91 - 98 %   02/03/18 1500 (!) 184/72 - - 89 (!) 0 98 %   02/03/18 1445 - - - 87 - 98 %   02/03/18 1430 - - - 88 - 99 %

## 2018-02-04 ENCOUNTER — APPOINTMENT (OUTPATIENT)
Dept: GENERAL RADIOLOGY | Age: 43
DRG: 005 | End: 2018-02-04
Payer: MEDICARE

## 2018-02-04 LAB
ALLEN TEST: ABNORMAL
ANION GAP SERPL CALCULATED.3IONS-SCNC: 10 MMOL/L (ref 9–17)
BUN BLDV-MCNC: 37 MG/DL (ref 6–20)
BUN/CREAT BLD: ABNORMAL (ref 9–20)
CALCIUM SERPL-MCNC: 8.1 MG/DL (ref 8.6–10.4)
CHLORIDE BLD-SCNC: 109 MMOL/L (ref 98–107)
CO2: 31 MMOL/L (ref 20–31)
CREAT SERPL-MCNC: 0.78 MG/DL (ref 0.5–0.9)
CULTURE: ABNORMAL
CULTURE: ABNORMAL
FIO2: 70
FIO2: 80
GFR AFRICAN AMERICAN: >60 ML/MIN
GFR NON-AFRICAN AMERICAN: >60 ML/MIN
GFR SERPL CREATININE-BSD FRML MDRD: ABNORMAL ML/MIN/{1.73_M2}
GFR SERPL CREATININE-BSD FRML MDRD: ABNORMAL ML/MIN/{1.73_M2}
GLUCOSE BLD-MCNC: 133 MG/DL (ref 74–100)
GLUCOSE BLD-MCNC: 138 MG/DL (ref 65–105)
GLUCOSE BLD-MCNC: 144 MG/DL (ref 65–105)
GLUCOSE BLD-MCNC: 148 MG/DL (ref 70–99)
GLUCOSE BLD-MCNC: 156 MG/DL (ref 74–100)
HCT VFR BLD CALC: 29 % (ref 36.3–47.1)
HEMOGLOBIN: 8.2 G/DL (ref 11.9–15.1)
Lab: ABNORMAL
MCH RBC QN AUTO: 25.1 PG (ref 25.2–33.5)
MCHC RBC AUTO-ENTMCNC: 28.3 G/DL (ref 28.4–34.8)
MCV RBC AUTO: 88.7 FL (ref 82.6–102.9)
METHEMOGLOBIN: 1 % (ref 0–1.5)
MODE: ABNORMAL
NEGATIVE BASE EXCESS, ART: ABNORMAL (ref 0–2)
NRBC AUTOMATED: 0 PER 100 WBC
O2 DEVICE/FLOW/%: ABNORMAL
PATIENT TEMP: ABNORMAL
PDW BLD-RTO: 15.6 % (ref 11.8–14.4)
PLATELET # BLD: 204 K/UL (ref 138–453)
PMV BLD AUTO: 10.9 FL (ref 8.1–13.5)
POC HCO3: 34.9 MMOL/L (ref 21–28)
POC HCO3: 35.4 MMOL/L (ref 21–28)
POC HCO3: 35.8 MMOL/L (ref 21–28)
POC HCO3: 36.9 MMOL/L (ref 21–28)
POC O2 SATURATION: 93 % (ref 94–98)
POC O2 SATURATION: 94 % (ref 94–98)
POC O2 SATURATION: 94 % (ref 94–98)
POC O2 SATURATION: 96 % (ref 94–98)
POC PCO2 TEMP: ABNORMAL MM HG
POC PCO2: 47.2 MM HG (ref 35–48)
POC PCO2: 57.7 MM HG (ref 35–48)
POC PCO2: 62.4 MM HG (ref 35–48)
POC PCO2: 63 MM HG (ref 35–48)
POC PH TEMP: ABNORMAL
POC PH: 7.36 (ref 7.35–7.45)
POC PH: 7.38 (ref 7.35–7.45)
POC PH: 7.4 (ref 7.35–7.45)
POC PH: 7.48 (ref 7.35–7.45)
POC PO2 TEMP: ABNORMAL MM HG
POC PO2: 63.9 MM HG (ref 83–108)
POC PO2: 72.3 MM HG (ref 83–108)
POC PO2: 75.2 MM HG (ref 83–108)
POC PO2: 84.1 MM HG (ref 83–108)
POC SODIUM: 153 MMOL/L (ref 138–146)
POSITIVE BASE EXCESS, ART: 10 (ref 0–3)
POSITIVE BASE EXCESS, ART: 8 (ref 0–3)
POTASSIUM SERPL-SCNC: 4 MMOL/L (ref 3.7–5.3)
RBC # BLD: 3.27 M/UL (ref 3.95–5.11)
SAMPLE SITE: ABNORMAL
SODIUM BLD-SCNC: 147 MMOL/L (ref 135–144)
SODIUM BLD-SCNC: 149 MMOL/L (ref 135–144)
SODIUM BLD-SCNC: 150 MMOL/L (ref 135–144)
SPECIMEN DESCRIPTION: ABNORMAL
STATUS: ABNORMAL
TCO2 (CALC), ART: 36 MMOL/L (ref 22–29)
TCO2 (CALC), ART: 37 MMOL/L (ref 22–29)
TCO2 (CALC), ART: 38 MMOL/L (ref 22–29)
TCO2 (CALC), ART: 39 MMOL/L (ref 22–29)
WBC # BLD: 8.7 K/UL (ref 3.5–11.3)

## 2018-02-04 PROCEDURE — 2500000003 HC RX 250 WO HCPCS: Performed by: INTERNAL MEDICINE

## 2018-02-04 PROCEDURE — 6370000000 HC RX 637 (ALT 250 FOR IP): Performed by: INTERNAL MEDICINE

## 2018-02-04 PROCEDURE — 6370000000 HC RX 637 (ALT 250 FOR IP): Performed by: EMERGENCY MEDICINE

## 2018-02-04 PROCEDURE — 94762 N-INVAS EAR/PLS OXIMTRY CONT: CPT

## 2018-02-04 PROCEDURE — 6360000002 HC RX W HCPCS: Performed by: STUDENT IN AN ORGANIZED HEALTH CARE EDUCATION/TRAINING PROGRAM

## 2018-02-04 PROCEDURE — 99291 CRITICAL CARE FIRST HOUR: CPT | Performed by: INTERNAL MEDICINE

## 2018-02-04 PROCEDURE — 99233 SBSQ HOSP IP/OBS HIGH 50: CPT | Performed by: INTERNAL MEDICINE

## 2018-02-04 PROCEDURE — 6370000000 HC RX 637 (ALT 250 FOR IP): Performed by: STUDENT IN AN ORGANIZED HEALTH CARE EDUCATION/TRAINING PROGRAM

## 2018-02-04 PROCEDURE — 94003 VENT MGMT INPAT SUBQ DAY: CPT

## 2018-02-04 PROCEDURE — 82803 BLOOD GASES ANY COMBINATION: CPT

## 2018-02-04 PROCEDURE — 84295 ASSAY OF SERUM SODIUM: CPT

## 2018-02-04 PROCEDURE — 71045 X-RAY EXAM CHEST 1 VIEW: CPT

## 2018-02-04 PROCEDURE — 83050 HGB METHEMOGLOBIN QUAN: CPT

## 2018-02-04 PROCEDURE — 80048 BASIC METABOLIC PNL TOTAL CA: CPT

## 2018-02-04 PROCEDURE — 6370000000 HC RX 637 (ALT 250 FOR IP): Performed by: HOSPITALIST

## 2018-02-04 PROCEDURE — 82947 ASSAY GLUCOSE BLOOD QUANT: CPT

## 2018-02-04 PROCEDURE — 6360000002 HC RX W HCPCS

## 2018-02-04 PROCEDURE — 4100000001 HC NITRIC OX DAILY

## 2018-02-04 PROCEDURE — 2580000003 HC RX 258: Performed by: STUDENT IN AN ORGANIZED HEALTH CARE EDUCATION/TRAINING PROGRAM

## 2018-02-04 PROCEDURE — 6360000002 HC RX W HCPCS: Performed by: INTERNAL MEDICINE

## 2018-02-04 PROCEDURE — 85027 COMPLETE CBC AUTOMATED: CPT

## 2018-02-04 PROCEDURE — 2580000003 HC RX 258: Performed by: INTERNAL MEDICINE

## 2018-02-04 PROCEDURE — 2000000000 HC ICU R&B

## 2018-02-04 RX ORDER — CLONIDINE HYDROCHLORIDE 0.1 MG/1
0.1 TABLET ORAL EVERY 6 HOURS PRN
Status: DISCONTINUED | OUTPATIENT
Start: 2018-02-04 | End: 2018-02-17

## 2018-02-04 RX ORDER — LISINOPRIL 20 MG/1
20 TABLET ORAL DAILY
Status: DISCONTINUED | OUTPATIENT
Start: 2018-02-04 | End: 2018-02-08

## 2018-02-04 RX ORDER — PREDNISONE 20 MG/1
20 TABLET ORAL DAILY
Status: COMPLETED | OUTPATIENT
Start: 2018-02-04 | End: 2018-02-06

## 2018-02-04 RX ORDER — CLONIDINE HYDROCHLORIDE 0.1 MG/1
0.1 TABLET ORAL 4 TIMES DAILY
Status: DISCONTINUED | OUTPATIENT
Start: 2018-02-04 | End: 2018-02-04

## 2018-02-04 RX ORDER — HYDRALAZINE HYDROCHLORIDE 50 MG/1
100 TABLET, FILM COATED ORAL EVERY 8 HOURS SCHEDULED
Status: DISCONTINUED | OUTPATIENT
Start: 2018-02-04 | End: 2018-02-08

## 2018-02-04 RX ORDER — FUROSEMIDE 10 MG/ML
20 INJECTION INTRAMUSCULAR; INTRAVENOUS ONCE
Status: COMPLETED | OUTPATIENT
Start: 2018-02-04 | End: 2018-02-04

## 2018-02-04 RX ORDER — ATROPINE SULFATE 0.1 MG/ML
1 INJECTION INTRAVENOUS ONCE
Status: DISCONTINUED | OUTPATIENT
Start: 2018-02-04 | End: 2018-02-04

## 2018-02-04 RX ORDER — FUROSEMIDE 10 MG/ML
INJECTION INTRAMUSCULAR; INTRAVENOUS
Status: COMPLETED
Start: 2018-02-04 | End: 2018-02-04

## 2018-02-04 RX ORDER — CLONIDINE HYDROCHLORIDE 0.2 MG/1
0.2 TABLET ORAL 4 TIMES DAILY
Status: DISCONTINUED | OUTPATIENT
Start: 2018-02-04 | End: 2018-02-04

## 2018-02-04 RX ORDER — PREDNISONE 10 MG/1
10 TABLET ORAL DAILY
Status: COMPLETED | OUTPATIENT
Start: 2018-02-07 | End: 2018-02-09

## 2018-02-04 RX ORDER — HYDRALAZINE HYDROCHLORIDE 50 MG/1
100 TABLET, FILM COATED ORAL EVERY 8 HOURS SCHEDULED
Status: DISCONTINUED | OUTPATIENT
Start: 2018-02-04 | End: 2018-02-04

## 2018-02-04 RX ORDER — ATROPINE SULFATE 0.1 MG/ML
1 INJECTION INTRAVENOUS
Status: ACTIVE | OUTPATIENT
Start: 2018-02-04 | End: 2018-02-04

## 2018-02-04 RX ADMIN — HYDRALAZINE HYDROCHLORIDE 20 MG: 20 INJECTION INTRAMUSCULAR; INTRAVENOUS at 22:15

## 2018-02-04 RX ADMIN — Medication 10 ML: at 08:08

## 2018-02-04 RX ADMIN — SODIUM CHLORIDE 5 MG/HR: 9 INJECTION, SOLUTION INTRAVENOUS at 04:30

## 2018-02-04 RX ADMIN — HEPARIN SODIUM 5000 UNITS: 5000 INJECTION, SOLUTION INTRAVENOUS; SUBCUTANEOUS at 06:20

## 2018-02-04 RX ADMIN — SODIUM CHLORIDE: 9 INJECTION, SOLUTION INTRAVENOUS at 12:40

## 2018-02-04 RX ADMIN — HYDRALAZINE HYDROCHLORIDE 20 MG: 20 INJECTION INTRAMUSCULAR; INTRAVENOUS at 02:54

## 2018-02-04 RX ADMIN — PROPOFOL 50 MCG/KG/MIN: 10 INJECTION, EMULSION INTRAVENOUS at 19:15

## 2018-02-04 RX ADMIN — CISATRACURIUM BESYLATE 2.8 MCG/KG/MIN: 10 INJECTION INTRAVENOUS at 02:55

## 2018-02-04 RX ADMIN — CISATRACURIUM BESYLATE 2.5 MCG/KG/MIN: 10 INJECTION INTRAVENOUS at 20:20

## 2018-02-04 RX ADMIN — AMLODIPINE BESYLATE 10 MG: 10 TABLET ORAL at 08:08

## 2018-02-04 RX ADMIN — Medication 10 ML: at 21:16

## 2018-02-04 RX ADMIN — FENTANYL CITRATE 100 MCG: 50 INJECTION INTRAMUSCULAR; INTRAVENOUS at 23:20

## 2018-02-04 RX ADMIN — Medication 150 MCG/HR: at 00:48

## 2018-02-04 RX ADMIN — FAMOTIDINE 20 MG: 20 TABLET, FILM COATED ORAL at 21:14

## 2018-02-04 RX ADMIN — CLONIDINE HYDROCHLORIDE 0.2 MG: 0.1 TABLET ORAL at 03:03

## 2018-02-04 RX ADMIN — CISATRACURIUM BESYLATE 2 MCG/KG/MIN: 10 INJECTION INTRAVENOUS at 10:57

## 2018-02-04 RX ADMIN — HYDRALAZINE HYDROCHLORIDE 100 MG: 50 TABLET, FILM COATED ORAL at 03:17

## 2018-02-04 RX ADMIN — PROPOFOL 45 MCG/KG/MIN: 10 INJECTION, EMULSION INTRAVENOUS at 17:09

## 2018-02-04 RX ADMIN — Medication 100 MCG/HR: at 19:15

## 2018-02-04 RX ADMIN — FUROSEMIDE 20 MG: 10 INJECTION INTRAMUSCULAR; INTRAVENOUS at 15:29

## 2018-02-04 RX ADMIN — CLONIDINE HYDROCHLORIDE 0.1 MG: 0.1 TABLET ORAL at 09:20

## 2018-02-04 RX ADMIN — Medication 100 MCG/HR: at 08:28

## 2018-02-04 RX ADMIN — HEPARIN SODIUM 5000 UNITS: 5000 INJECTION, SOLUTION INTRAVENOUS; SUBCUTANEOUS at 21:26

## 2018-02-04 RX ADMIN — FAMOTIDINE 20 MG: 20 TABLET, FILM COATED ORAL at 08:08

## 2018-02-04 RX ADMIN — FENTANYL CITRATE 100 MCG: 50 INJECTION INTRAMUSCULAR; INTRAVENOUS at 04:15

## 2018-02-04 RX ADMIN — INSULIN LISPRO 1 UNITS: 100 INJECTION, SOLUTION INTRAVENOUS; SUBCUTANEOUS at 17:44

## 2018-02-04 RX ADMIN — FUROSEMIDE 20 MG: 10 INJECTION, SOLUTION INTRAMUSCULAR; INTRAVENOUS at 15:29

## 2018-02-04 RX ADMIN — HYDRALAZINE HYDROCHLORIDE 100 MG: 50 TABLET, FILM COATED ORAL at 13:27

## 2018-02-04 RX ADMIN — PROPOFOL 50 MCG/KG/MIN: 10 INJECTION, EMULSION INTRAVENOUS at 02:35

## 2018-02-04 RX ADMIN — PROPOFOL 50 MCG/KG/MIN: 10 INJECTION, EMULSION INTRAVENOUS at 00:44

## 2018-02-04 RX ADMIN — LISINOPRIL 20 MG: 20 TABLET ORAL at 12:21

## 2018-02-04 RX ADMIN — CLONIDINE HYDROCHLORIDE 0.1 MG: 0.1 TABLET ORAL at 14:47

## 2018-02-04 RX ADMIN — WATER 2 G: 1 INJECTION INTRAMUSCULAR; INTRAVENOUS; SUBCUTANEOUS at 14:42

## 2018-02-04 RX ADMIN — PROPOFOL 50 MCG/KG/MIN: 10 INJECTION, EMULSION INTRAVENOUS at 14:38

## 2018-02-04 RX ADMIN — PROPOFOL 35 MCG/KG/MIN: 10 INJECTION, EMULSION INTRAVENOUS at 12:41

## 2018-02-04 RX ADMIN — HYDRALAZINE HYDROCHLORIDE 20 MG: 20 INJECTION INTRAMUSCULAR; INTRAVENOUS at 13:42

## 2018-02-04 RX ADMIN — PROPOFOL 50 MCG/KG/MIN: 10 INJECTION, EMULSION INTRAVENOUS at 23:00

## 2018-02-04 RX ADMIN — PROPOFOL 50 MCG/KG/MIN: 10 INJECTION, EMULSION INTRAVENOUS at 04:48

## 2018-02-04 RX ADMIN — PREDNISONE 20 MG: 20 TABLET ORAL at 13:27

## 2018-02-04 RX ADMIN — HYDRALAZINE HYDROCHLORIDE 20 MG: 20 INJECTION INTRAMUSCULAR; INTRAVENOUS at 17:44

## 2018-02-04 RX ADMIN — FENTANYL CITRATE 100 MCG: 50 INJECTION INTRAMUSCULAR; INTRAVENOUS at 22:19

## 2018-02-04 RX ADMIN — DESMOPRESSIN ACETATE 40 MG: 0.2 TABLET ORAL at 21:14

## 2018-02-04 RX ADMIN — INSULIN LISPRO 1 UNITS: 100 INJECTION, SOLUTION INTRAVENOUS; SUBCUTANEOUS at 06:31

## 2018-02-04 RX ADMIN — HYDRALAZINE HYDROCHLORIDE 100 MG: 50 TABLET, FILM COATED ORAL at 21:14

## 2018-02-04 RX ADMIN — PROPOFOL 25 MCG/KG/MIN: 10 INJECTION, EMULSION INTRAVENOUS at 08:03

## 2018-02-04 RX ADMIN — HEPARIN SODIUM 5000 UNITS: 5000 INJECTION, SOLUTION INTRAVENOUS; SUBCUTANEOUS at 13:27

## 2018-02-04 NOTE — PROGRESS NOTES
Patients sister Nicole Weinstein and son Shayna Vaughn at bedside. Writer RN spoke with Alamo Willard and Rupinder Chula  and updated them of concerns in relation to patients 10 second pause in heart rate that occurred this AM at 0729 with repositioning as this is the longest noted pause in heart rate (see previous documentation in flow sheet noted and in chart of EKG strip). Writer inquired as to wether or not family knew if patient would like to remain a full code should heart rate not spontaneously return to normal sinus rhythm as it has before. Sister Nicole Weinstein relayed to writer that patient had text messaged her asking her to be power of  and telling her sister Otoniel Lamar she did not want to be intubated on January 28 th, 2018 (text message conversation also shown to writer). Writer relayed to Otoniel Lamar and Rupinder Quiros that there was no official paperwork filed in the patients chart and the patient remains a full code at this time. In addition, writer informed Rupinder Quiros and Otoniel Lamar the meaning of patient remaining a full code should patients noted heart rate pauses continue without spontaneous return. With previous information as stated above in relation to the text message patients sister Otoniel Lamar received from patient prior to critical condition family was informed of DNR-CCA code status option by writer considering the patients frequent noted pauses in heart rate . Lastly, patients sister Otoniel Lamar informed that care team is unable to appoint her as power of  for patient despite what the patient had relayed via text message on January 28 th and that patients carmen Quiros would remain decision maker as he is next of kin. In addition, patients immediate family seems very involved and supportive of one another during this difficult time. Wing Diego paged to meet with patients sister Otoniel Lamar and carmen Quiros. Nino Kapadia continues to offer support and availability should any questions and concerns come up.

## 2018-02-04 NOTE — PROGRESS NOTES
abnormality. Train of 4 in place  Neck: no adenopathy, no carotid bruit, no JVD, supple, symmetrical, trachea midline and thyroid not enlarged, symmetric, no tenderness/mass/nodules  Lungs:   Heart: tacycardia, S1 and s2 noted. No Murmurs gallops or rubs. Difficult exam secondary to body habitus  Abdomen: soft, non-tender; bowel sounds normal; no masses,  no organomegaly  Extremities: extremities normal, atraumatic, no cyanosis or edema  Neurologic: Mental status: Alert, oriented, thought content appropriate    EKG: NSR    ECHO 1/27/18:   Technically difficult study. All segments not well seen. No comment can be made regarding specific wall motion. LV chamber dimension is normal. Systolic function appears to be hyperdynamic with an estimated EF of 65%. Right ventricular dilatation with normal systolic function. No significant valvular regurgitation or stenosis seen. Assessment / Acute Cardiac Problems:   1. NSTEMI likely demand ischemia from underlying acute illness and resp failure  2. Asystole- due to vagal reflexes induced by suction or patient moving while being intubated. No indication for pacing based on the available findings . When not stimulated HR is sinus rhythm with rates of 80th indicating good sinus node function. Patient Active Problem List:     Pneumonia     NSTEMI (non-ST elevated myocardial infarction) [facility])     Pulmonary edema     Hypertensive emergency     Acute respiratory failure ([address]     Smoker     Morbid obesity ([address]     Elevated troponin     Obesity hypoventilation syndrome (HCC)     SOB (shortness of breath)     COPD exacerbation ([address]      Plan of Treatment:   1. Continue supportive management   2. No indication for TPM/PPM at this time, avoid obvious triggers of her vagal reactions. 3. Place transcutaneous pacing patches if needed during repositioning.  Atropine on bedside  4. BP control    Electronically signed by China Lynne MD on 2/4/2018 at 10:04 AM  [facility]  [phone]  .

## 2018-02-04 NOTE — PROGRESS NOTES
Metabolic Profile:   Recent Labs      02/02/18   0346  02/03/18   0436  02/04/18   0623   NA  144  149*  150*   K  4.3  4.4  4.0   CL  102  108*  109*   CO2  28  30  31   BUN  33*  38*  37*   CREATININE  0.70  0.91*  0.78   GLUCOSE  133*  169*  148*   CALCIUM  8.3*  8.3*  8.1*      Magnesium:   Lab Results   Component Value Date    MG 2.2 02/02/2018     Phosphorus:   Lab Results   Component Value Date    PHOS 3.7 02/02/2018     Ionized Calcium:   Lab Results   Component Value Date    CAION 1.22 02/02/2018        Radiology/Imaging:     Chest Xray (2/4/2018): Stable exam    1/27/2018 echocardiogram  CONCLUSIONS    Summary  Technically difficult study. All segments not well seen. No comment can be  made regarding specific wall motion. LV chamber dimension is normal. Systolic function appears to be hyperdynamic  with an estimated EF of 65%. Right ventricular dilatation with normal systolic function. No significant valvular regurgitation or stenosis seen. ASSESSMENT:     Principal Problem:    ARDS (adult respiratory distress syndrome) ([facility])  Active Problems:    Pneumonia of both lower lobes due to infectious organism    NSTEMI (non-ST elevated myocardial infarction) ([facility] [address]    Acute pulmonary edema ([facility])    Hypertensive emergency    Acute respiratory failure with hypoxia and hypercapnia ([facility])    Smoker    Morbid obesity (HCC)    Elevated troponin    Obesity hypoventilation syndrome (HCC)    SOB (shortness of breath)    COPD exacerbation ([facility])    Fever    Disease due to rhinovirus       PLAN:     WEAN PER PROTOCOL:  [] No   [x] Yes  [] N/A    DISCONTINUE ANY LABS:   [x] No   [] Yes    ICU PROPHYLAXIS:  Stress ulcer:  [] PPI Agent  [x] [id number] [] Sucralfate  [] Other:  VTE:   [] Enoxaparin  [] Unfract.  Heparin Subcut  [] EPC Cuffs    NUTRITION:  [] NPO [] Tube Feeding (Specify: ) [] TPN  [x] PO (Diet: Diet Tube Feed Continuous/Cyclic w/ Diet)    HOME MEDICATIONS RECONCILED: [] No  [x] Yes    INSULIN DRIP:   [x] received clonidine for intermediate increasing blood pressure. Her sodium is 150 this morning and BUN is 37 high urine output was about 60 mL an hour since this morning, her endotracheal secretion is small to moderate and her WBC count is 8.7 and hemoglobin is 8.2 with a platelet count is 114 and blood sugar is stable, she did not have any fever spike. She had intermittent episode of sinus pauses while she is on neuromuscular blockade but no sustained bradycardia, she will be on paralytic for 48 hours or more tomorrow morning and will need to reevaluate the continuation of paralytic and high-frequency oscillator ventilation if she has bradycardia with intermittent sinus pauses because at that point we may discontinue paralytic and we may have to start from pressure control ventilation on conventional ventilator therapy as she continued to be hypoxic with 70% FiO2 on HFOV with a mean airway pressure of 27, with FiO2 of 70-80%. Addiitionally I recommend:  · Clonidine 0.1 mg Q6h prn  · Continue Norvasc, lisinopril and hydralazine at current doses and will adjust the medication dosage. · Discussed with respiratory therapist about HF0V and inhaled nitric oxide  · Prednisone taper dose  · Minimize IV fluids and to keep 75 to 100 ml/hr  · Add free water for hypernatremia  · Monitor urine output and renal function  · Continue HFOV at current setting and will follow-up protocol and arterial blood gases and chest x-rays  · Monitor for bradycardia, temp and wbc count  · Monitor sodium. · Currently she is on paralytic/neuro and muscular blockade along with propofol and fentanyl drip    Total critical care time caring for this patient with life threatening, unstable organ failure, including direct patient contact, management of life support systems, review of data including imaging and labs, discussions with other team members and physicians at least 28  Min so far today, excluding procedures.             Flor Aaron,

## 2018-02-04 NOTE — PROGRESS NOTES
exam findings,  with the resident. I have seen and examined the patient and the key elements of all parts of the encounter have been performed by me. I agree with the assessment, plan and orders as documented by the resident.     Olya Gavin, Infectious Diseases    \

## 2018-02-04 NOTE — PLAN OF CARE
Problem: Anxiety/Stress:  Goal: Level of anxiety will decrease  Level of anxiety will decrease   Outcome: Ongoing      Problem: Gas Exchange - Impaired:  Goal: Levels of oxygenation will improve  Levels of oxygenation will improve   Outcome: Ongoing      Problem: Pain:  Goal: Pain level will decrease  Pain level will decrease    Outcome: Ongoing    Goal: Recognizes and communicates pain  Recognizes and communicates pain   Outcome: Ongoing      Problem: Falls - Risk of  Goal: Absence of falls  Outcome: Ongoing      Problem: Risk for Impaired Skin Integrity  Goal: Tissue integrity - skin and mucous membranes  Structural intactness and normal physiological function of skin and  mucous membranes.    Outcome: Ongoing      Problem: Pain:  Goal: Control of acute pain  Control of acute pain   Outcome: Ongoing    Goal: Control of chronic pain  Control of chronic pain   Outcome: Ongoing    Goal: Pain level will decrease  Pain level will decrease    Outcome: Ongoing      Problem: Nutrition  Goal: Optimal nutrition therapy  Outcome: Ongoing      Problem: ABCDS Injury Assessment  Goal: Absence of physical injury  Outcome: Ongoing      Problem: Neurological  Goal: Maximum potential motor/sensory/cognitive function  Outcome: Ongoing      Problem: Infection - Central Venous Catheter-Associated Bloodstream Infection:  Goal: Will show no infection signs and symptoms  Will show no infection signs and symptoms   Outcome: Ongoing

## 2018-02-04 NOTE — FLOWSHEET NOTE
Dr Misty Elena at bedside, patient BP continues to be elevated, see orders and MAR. Will continue to monitor.

## 2018-02-05 ENCOUNTER — APPOINTMENT (OUTPATIENT)
Dept: GENERAL RADIOLOGY | Age: 43
DRG: 005 | End: 2018-02-05
Payer: MEDICARE

## 2018-02-05 LAB
ACTION: NORMAL
ALLEN TEST: ABNORMAL
ANION GAP SERPL CALCULATED.3IONS-SCNC: 11 MMOL/L (ref 9–17)
BUN BLDV-MCNC: 43 MG/DL (ref 6–20)
BUN/CREAT BLD: ABNORMAL (ref 9–20)
CALCIUM IONIZED: 1.12 MMOL/L (ref 1.13–1.33)
CALCIUM SERPL-MCNC: 8 MG/DL (ref 8.6–10.4)
CHLORIDE BLD-SCNC: 109 MMOL/L (ref 98–107)
CO2: 31 MMOL/L (ref 20–31)
CREAT SERPL-MCNC: 0.76 MG/DL (ref 0.5–0.9)
CULTURE: NO GROWTH
CULTURE: NORMAL
DATE AND TIME: NORMAL
DIRECT EXAM: NORMAL
FIO2: 60
FIO2: 65
FIO2: 70
GFR AFRICAN AMERICAN: >60 ML/MIN
GFR NON-AFRICAN AMERICAN: >60 ML/MIN
GFR SERPL CREATININE-BSD FRML MDRD: ABNORMAL ML/MIN/{1.73_M2}
GFR SERPL CREATININE-BSD FRML MDRD: ABNORMAL ML/MIN/{1.73_M2}
GLUCOSE BLD-MCNC: 129 MG/DL (ref 65–105)
GLUCOSE BLD-MCNC: 132 MG/DL (ref 65–105)
GLUCOSE BLD-MCNC: 146 MG/DL (ref 70–99)
GLUCOSE BLD-MCNC: 148 MG/DL (ref 65–105)
GLUCOSE BLD-MCNC: 150 MG/DL (ref 74–100)
HCT VFR BLD CALC: 31.5 % (ref 36.3–47.1)
HEMOGLOBIN: 8.9 G/DL (ref 11.9–15.1)
Lab: NORMAL
MCH RBC QN AUTO: 25.1 PG (ref 25.2–33.5)
MCHC RBC AUTO-ENTMCNC: 28.3 G/DL (ref 28.4–34.8)
MCV RBC AUTO: 89 FL (ref 82.6–102.9)
METHEMOGLOBIN: 0.3 % (ref 0–1.5)
MODE: ABNORMAL
NEGATIVE BASE EXCESS, ART: ABNORMAL (ref 0–2)
NOTIFY: NORMAL
NRBC AUTOMATED: 0 PER 100 WBC
O2 DEVICE/FLOW/%: ABNORMAL
PATIENT TEMP: ABNORMAL
PDW BLD-RTO: 15.6 % (ref 11.8–14.4)
PLATELET # BLD: 221 K/UL (ref 138–453)
PMV BLD AUTO: 10.5 FL (ref 8.1–13.5)
POC HCO3: 34.8 MMOL/L (ref 21–28)
POC HCO3: 34.8 MMOL/L (ref 21–28)
POC HCO3: 35.1 MMOL/L (ref 21–28)
POC O2 SATURATION: 87 % (ref 94–98)
POC O2 SATURATION: 88 % (ref 94–98)
POC O2 SATURATION: 93 % (ref 94–98)
POC PCO2 TEMP: ABNORMAL MM HG
POC PCO2: 64.8 MM HG (ref 35–48)
POC PCO2: 69.9 MM HG (ref 35–48)
POC PCO2: 72.1 MM HG (ref 35–48)
POC PH TEMP: ABNORMAL
POC PH: 7.29 (ref 7.35–7.45)
POC PH: 7.31 (ref 7.35–7.45)
POC PH: 7.34 (ref 7.35–7.45)
POC PO2 TEMP: ABNORMAL MM HG
POC PO2: 60.7 MM HG (ref 83–108)
POC PO2: 60.8 MM HG (ref 83–108)
POC PO2: 78.7 MM HG (ref 83–108)
POSITIVE BASE EXCESS, ART: 7 (ref 0–3)
POTASSIUM SERPL-SCNC: 4 MMOL/L (ref 3.7–5.3)
RBC # BLD: 3.54 M/UL (ref 3.95–5.11)
READ BACK: YES
SAMPLE SITE: ABNORMAL
SODIUM BLD-SCNC: 148 MMOL/L (ref 135–144)
SODIUM BLD-SCNC: 149 MMOL/L (ref 135–144)
SODIUM BLD-SCNC: 151 MMOL/L (ref 135–144)
SPECIMEN DESCRIPTION: NORMAL
STATUS: NORMAL
TCO2 (CALC), ART: 37 MMOL/L (ref 22–29)
WBC # BLD: 10.9 K/UL (ref 3.5–11.3)

## 2018-02-05 PROCEDURE — 85027 COMPLETE CBC AUTOMATED: CPT

## 2018-02-05 PROCEDURE — 82803 BLOOD GASES ANY COMBINATION: CPT

## 2018-02-05 PROCEDURE — 99291 CRITICAL CARE FIRST HOUR: CPT | Performed by: INTERNAL MEDICINE

## 2018-02-05 PROCEDURE — 6360000002 HC RX W HCPCS: Performed by: STUDENT IN AN ORGANIZED HEALTH CARE EDUCATION/TRAINING PROGRAM

## 2018-02-05 PROCEDURE — 6370000000 HC RX 637 (ALT 250 FOR IP): Performed by: HOSPITALIST

## 2018-02-05 PROCEDURE — 6370000000 HC RX 637 (ALT 250 FOR IP): Performed by: INTERNAL MEDICINE

## 2018-02-05 PROCEDURE — 2580000003 HC RX 258: Performed by: STUDENT IN AN ORGANIZED HEALTH CARE EDUCATION/TRAINING PROGRAM

## 2018-02-05 PROCEDURE — 82330 ASSAY OF CALCIUM: CPT

## 2018-02-05 PROCEDURE — 71045 X-RAY EXAM CHEST 1 VIEW: CPT

## 2018-02-05 PROCEDURE — 94003 VENT MGMT INPAT SUBQ DAY: CPT

## 2018-02-05 PROCEDURE — 2500000003 HC RX 250 WO HCPCS: Performed by: INTERNAL MEDICINE

## 2018-02-05 PROCEDURE — 4100000001 HC NITRIC OX DAILY

## 2018-02-05 PROCEDURE — 6370000000 HC RX 637 (ALT 250 FOR IP): Performed by: EMERGENCY MEDICINE

## 2018-02-05 PROCEDURE — 94762 N-INVAS EAR/PLS OXIMTRY CONT: CPT

## 2018-02-05 PROCEDURE — 80048 BASIC METABOLIC PNL TOTAL CA: CPT

## 2018-02-05 PROCEDURE — 6370000000 HC RX 637 (ALT 250 FOR IP): Performed by: STUDENT IN AN ORGANIZED HEALTH CARE EDUCATION/TRAINING PROGRAM

## 2018-02-05 PROCEDURE — 97110 THERAPEUTIC EXERCISES: CPT

## 2018-02-05 PROCEDURE — 2580000003 HC RX 258: Performed by: INTERNAL MEDICINE

## 2018-02-05 PROCEDURE — 83050 HGB METHEMOGLOBIN QUAN: CPT

## 2018-02-05 PROCEDURE — 84295 ASSAY OF SERUM SODIUM: CPT

## 2018-02-05 PROCEDURE — 82947 ASSAY GLUCOSE BLOOD QUANT: CPT

## 2018-02-05 PROCEDURE — 2000000000 HC ICU R&B

## 2018-02-05 RX ORDER — DEXTROSE MONOHYDRATE 50 MG/ML
INJECTION, SOLUTION INTRAVENOUS CONTINUOUS
Status: DISCONTINUED | OUTPATIENT
Start: 2018-02-05 | End: 2018-02-06

## 2018-02-05 RX ORDER — POLYVINYL ALCOHOL 14 MG/ML
1 SOLUTION/ DROPS OPHTHALMIC PRN
Status: DISCONTINUED | OUTPATIENT
Start: 2018-02-05 | End: 2018-02-23 | Stop reason: HOSPADM

## 2018-02-05 RX ORDER — CLONIDINE HYDROCHLORIDE 0.1 MG/1
0.1 TABLET ORAL ONCE
Status: DISCONTINUED | OUTPATIENT
Start: 2018-02-05 | End: 2018-02-05

## 2018-02-05 RX ADMIN — HYDRALAZINE HYDROCHLORIDE 20 MG: 20 INJECTION INTRAMUSCULAR; INTRAVENOUS at 11:54

## 2018-02-05 RX ADMIN — PROPOFOL 30 MCG/KG/MIN: 10 INJECTION, EMULSION INTRAVENOUS at 10:16

## 2018-02-05 RX ADMIN — LISINOPRIL 20 MG: 20 TABLET ORAL at 08:15

## 2018-02-05 RX ADMIN — FAMOTIDINE 20 MG: 20 TABLET, FILM COATED ORAL at 20:00

## 2018-02-05 RX ADMIN — HYDRALAZINE HYDROCHLORIDE 20 MG: 20 INJECTION INTRAMUSCULAR; INTRAVENOUS at 03:39

## 2018-02-05 RX ADMIN — PROPOFOL 25 MCG/KG/MIN: 10 INJECTION, EMULSION INTRAVENOUS at 06:59

## 2018-02-05 RX ADMIN — CLONIDINE HYDROCHLORIDE 0.1 MG: 0.1 TABLET ORAL at 04:26

## 2018-02-05 RX ADMIN — FENTANYL CITRATE 100 MCG: 50 INJECTION INTRAMUSCULAR; INTRAVENOUS at 04:17

## 2018-02-05 RX ADMIN — INSULIN LISPRO 1 UNITS: 100 INJECTION, SOLUTION INTRAVENOUS; SUBCUTANEOUS at 11:54

## 2018-02-05 RX ADMIN — Medication 100 MCG/HR: at 12:58

## 2018-02-05 RX ADMIN — PROPOFOL 40 MCG/KG/MIN: 10 INJECTION, EMULSION INTRAVENOUS at 04:17

## 2018-02-05 RX ADMIN — HEPARIN SODIUM 5000 UNITS: 5000 INJECTION, SOLUTION INTRAVENOUS; SUBCUTANEOUS at 21:19

## 2018-02-05 RX ADMIN — CISATRACURIUM BESYLATE 2.5 MCG/KG/MIN: 10 INJECTION INTRAVENOUS at 05:24

## 2018-02-05 RX ADMIN — HEPARIN SODIUM 5000 UNITS: 5000 INJECTION, SOLUTION INTRAVENOUS; SUBCUTANEOUS at 06:07

## 2018-02-05 RX ADMIN — Medication 10 ML: at 20:00

## 2018-02-05 RX ADMIN — PROPOFOL 30 MCG/KG/MIN: 10 INJECTION, EMULSION INTRAVENOUS at 21:18

## 2018-02-05 RX ADMIN — PROPOFOL 30 MCG/KG/MIN: 10 INJECTION, EMULSION INTRAVENOUS at 17:33

## 2018-02-05 RX ADMIN — Medication 100 MCG/HR: at 04:38

## 2018-02-05 RX ADMIN — PREDNISONE 20 MG: 20 TABLET ORAL at 08:15

## 2018-02-05 RX ADMIN — CLONIDINE HYDROCHLORIDE 0.1 MG: 0.1 TABLET ORAL at 12:06

## 2018-02-05 RX ADMIN — PROPOFOL 30 MCG/KG/MIN: 10 INJECTION, EMULSION INTRAVENOUS at 00:34

## 2018-02-05 RX ADMIN — AMLODIPINE BESYLATE 10 MG: 10 TABLET ORAL at 08:15

## 2018-02-05 RX ADMIN — PROPOFOL 30 MCG/KG/MIN: 10 INJECTION, EMULSION INTRAVENOUS at 13:43

## 2018-02-05 RX ADMIN — Medication 10 ML: at 20:01

## 2018-02-05 RX ADMIN — CISATRACURIUM BESYLATE 3 MCG/KG/MIN: 10 INJECTION INTRAVENOUS at 18:25

## 2018-02-05 RX ADMIN — INSULIN LISPRO 1 UNITS: 100 INJECTION, SOLUTION INTRAVENOUS; SUBCUTANEOUS at 07:00

## 2018-02-05 RX ADMIN — DEXTROSE MONOHYDRATE: 50 INJECTION, SOLUTION INTRAVENOUS at 11:17

## 2018-02-05 RX ADMIN — Medication 100 MCG/HR: at 21:58

## 2018-02-05 RX ADMIN — CISATRACURIUM BESYLATE 3 MCG/KG/MIN: 10 INJECTION INTRAVENOUS at 11:50

## 2018-02-05 RX ADMIN — HYDRALAZINE HYDROCHLORIDE 100 MG: 50 TABLET, FILM COATED ORAL at 21:19

## 2018-02-05 RX ADMIN — CLONIDINE HYDROCHLORIDE 0.1 MG: 0.1 TABLET ORAL at 04:09

## 2018-02-05 RX ADMIN — HEPARIN SODIUM 5000 UNITS: 5000 INJECTION, SOLUTION INTRAVENOUS; SUBCUTANEOUS at 16:40

## 2018-02-05 RX ADMIN — DESMOPRESSIN ACETATE 40 MG: 0.2 TABLET ORAL at 20:00

## 2018-02-05 RX ADMIN — FAMOTIDINE 20 MG: 20 TABLET, FILM COATED ORAL at 08:15

## 2018-02-05 ASSESSMENT — PAIN SCALES - GENERAL: PAINLEVEL_OUTOF10: 0

## 2018-02-05 NOTE — PROGRESS NOTES
Other:  VTE:   [] Enoxaparin  [] Unfract. Heparin Subcut  [] EPC Cuffs    NUTRITION:  [] NPO [] Tube Feeding (Specify: ) [] TPN  [x] PO (Diet: Diet Tube Feed Continuous/Cyclic w/ Diet)    HOME MEDICATIONS RECONCILED: [] No  [x] Yes    INSULIN DRIP:   [x] No   [] Yes    CONSULTATION NEEDED:  [] No   [x] Yes    FAMILY UPDATED:    [] No   [x] Yes    TRANSFER OUT OF ICU:   [x] No   [] Yes    ADDITIONAL PLAN:     Hypertensive emergency resolved   Off cardene gtt  Continue Norvasc 10 mg. On  Hydralazine 100 mgTID and Lisinopril to 20 mg daily. Can titrate up lisinopril if needed. No BB as patient has pulses related to vagal stimulation       Acute Toxic respiratory failure from Community acquired pneumonia. Rocephin 2 g IV q24. ID following. Rhinovirus positive. Continue droplet precaution     Acute respiratory failure on ARDS protocol-worsening  Continue Vent on assist-control per ARDS protocol, oscillator   Duoneb/Dulera. CXR Daily      Hypernatremia Ongoing  On free water 300 ml Q6   Total free water deficit 5.9 L  Start D5W @ 50 ml/hr    Troponemia initially present and Ischemia workup once extubated. HIEN resolved   Smoker on nicotine patch. DVT Prophylaxis. Heparin sc  GI Prophylaxis Pepcid 20 mg bid    Kaleigh Albrecht MD      Department of Internal Medicine  [facility]         2/5/2018, 10:17 AM   Attending Physician Statement  I have discussed the care of Bobbetta Baumgarten, including pertinent history and exam findings,  with the resident. I have seen and examined the patient and the key elements of all parts of the encounter have been performed by me. I agree with the assessment, plan and orders as documented by the resident with additions . Patient on high-frequency oscillator ventilation, inhaled nitric oxide, but continues to be hypoxic. No improvement has been made .   Continue present treatment  Prognosis is guarded  Total critical care time caring for this

## 2018-02-05 NOTE — PROGRESS NOTES
[facility]  Occupational Therapy Not Seen Note    Patient not available for Occupational Therapy due to:    [] Testing:    [] Hemodialysis    [] Blood Transfusion in Progress    []Refusal by Patient:    [] Surgery/Procedure:    [] Strict Bedrest    [x] Sedation/Intubated. On paralytics. PT following for PROM. [] Spine Precautions     [] Pt being transferred to palliative care at this time. Spoke with pt/family and OT services to be defered. [] Pt independent with functional mobility and functional tasks.  Pt with no OT acute care needs at this time, will defer OT eval.    Next Scheduled Treatment: Will check back 2/7-2/8 and complete OT eval and tx session when pt is able to actively participate    Signature: Maple Sovereign, OTR/L

## 2018-02-05 NOTE — PROGRESS NOTES
% Methemoglobin 1.6 by Masimo finger probe. Will need correlation with a.m.  Blood draw for methemoglobin

## 2018-02-05 NOTE — PLAN OF CARE
Problem: RESPIRATORY  Intervention: Respiratory assessment  Problem: OXYGENATION/RESPIRATORY FUNCTION  Goal: Patient will maintain patent airway  Outcome: Ongoing  Goal: Patient will achieve/maintain normal respiratory rate/effort  Respiratory rate and effort will be within normal limits for the patient    Outcome: Ongoing     Problem: MECHANICAL VENTILATION  Goal: Patient will maintain patent airway  Outcome: Ongoing  Goal: Oral health is maintained or improved  Outcome: Ongoing  Goal: ET tube will be managed safely  Outcome: Ongoing  Goal: Ability to express needs and understand communication  Outcome: Ongoing  Goal: Mobility/activity is maintained at optimum level for patient  Outcome: Ongoing     Problem: ASPIRATION PRECAUTIONS  Goal: Patients risk of aspiration is minimized  Outcome: Ongoing     Problem: SKIN INTEGRITY  Goal: Skin integrity is maintained or improved  Outcome: Ongoing

## 2018-02-05 NOTE — FLOWSHEET NOTE
DATE: 2018  NAME: Sukh Ortega  MRN: [id number]   : 1975    Subjective: Rn agreeable to ROM. Pt on paralytic. Pain: RAMONE, no significant change in vitals and no facial grimacing  Patient follows: No commands  Is patient on ventilator: Yes  Is patient on sedation: Yes  Precautions: arterial line    Therapeutic exercises:  PROM all planes x 10 reps BUE and BLE. Bilateral gastrocnemius stretching, 30''x2  B hip flexion limited d/t body habitus. Moderate edema noted in BUEs; boggy end-feel in DIP/PIP joints of B hands. Goals  Short Term Goals  Short term goal 1: prevent contractures x 4  Short term goal 2: facilitate active movement x 4 when pt off sedation  Short term goal 3: mobilize pt when off sedation and set goals          Plan: Progress functional mobility as medically appropriate.    Time In: 835 am  Time Out: 847 am  Time Coded Minutes (treatment minutes): 12  Rehab Potential: CTA pending extubation  Treatments/week: 2-3    Salome Rico, PTA

## 2018-02-05 NOTE — PROGRESS NOTES
10.9  Procalcitonin: 0.85 (high)    IMAGING  18 cxr: worsening diffuse b/l airspace opacities suggestive of multilobar pneumonia or pulmonary edema     18 echo: hyperdynamic left ventricle, otherwise normal    18 cxr: patchy opacification bialterally     DISCUSSION:  Patient with COPD, morbid obesity. Presented with acute rhinoviral infection, pneumonia and respiratory failure. Has required ventilatory support. Clinical picture suggestive of ARDS. There is no treatment for the acute rhinoviral infection. Patient is undergoing treatment with the ARDS protocol. Ceftriaxone being given for presumptive associated community-acquired pneumonia, although cultures have not yielded any bacterial growth. Pro-calcitonin returned high suggesting pathological process in lung. Patient remains critically ill. Discussed with CC, RN. ICU care 30 min. Physical Examination :     Patient Vitals for the past 8 hrs:   BP Temp Temp src Pulse Resp SpO2 Height Weight   18 0815 (!) 164/51 - - - - - - -   18 0800 (!) 111/54 98.8 °F (37.1 °C) Oral 81 (!) 2 95 % - -   18 0700 (!) 68/52 - - 76 (!) 0 96 % - -   18 0619 - - - 75 (!) 0 99 % - -   18 0600 (!) 100/37 - - 88 (!) 6 - - -   18 0506 - - - 93 (!) 0 92 % - -   18 0500 (!) 166/58 - - 94 (!) 0 - - -   18 0439 - - - 94 (!) 7 92 % - -   18 0400 (!) 190/61 99.1 °F (37.3 °C) Oral 92 13 92 % 5' (1.524 m) (!) 337 lb 6.4 oz (153 kg)   18 0311 - - - 75 (!) 2 93 % - -   18 0300 (!) 131/50 - - 73 (!) 0 92 % - -   18 0230 - - - 68 (!) 0 98 % - -   18 0200 (!) 118/36 - - 68 (!) 0 98 % - -     Temp (24hrs), Av.9 °F (37.2 °C), Min:98.6 °F (37 °C), Max:99.1 °F (37.3 °C)    General Appearance: sedated on the ventilator  Eyes: Sclera anicteric; conjunctivae pink. No hemorhages, no embolic phenomena. ENT:Oropharynx clear, without erythema, exudate, or thrush. No tenderness of sinuses.  No nasal drainage. Neck: Supple, without lymphadenopathy. No JVD. Pulmonary/Chest: Clear to auscultation, without wheezes, rales, or rhonchi. No areas of consolidation. Good air movement bilaterally. No egophony. Cardiovascular:Regular rate and rhythm without murmurs, rubs, or gallops. S1 and S2 normal.  Abdomen: Soft, non tender. Bowel sounds normal. No cramps. No organomegaly  All four Extremities:No cyanosis, clubbing, edema, or effusions. Neurologic:No gross sensory or motor deficits. Skin: No rash or lesions. IV site inspected: no inflammation. Medical Decision Making:   I have independently reviewed/ordered the following labs:    CBC with Differential:   Recent Labs      02/04/18 0623 02/05/18   0606   WBC  8.7  10.9   HGB  8.2*  8.9*   HCT  29.0*  31.5*   PLT  204  221     BMP:   Recent Labs      02/04/18   0623   02/05/18   0051  02/05/18   0606   NA  150*   < >  148*  151*   K  4.0   --    --   4.0   CL  109*   --    --   109*   CO2  31   --    --   31   BUN  37*   --    --   43*   CREATININE  0.78   --    --   0.76    < > = values in this interval not displayed.      Hepatic Function Panel:     Medications:      cloNIDine  0.1 mg Oral Once    lisinopril  20 mg Oral Daily    hydrALAZINE  100 mg Oral 3 times per day    predniSONE  20 mg Oral Daily    Followed by   Yolanda Medrano ON 2/7/2018] predniSONE  10 mg Oral Daily    heparin (porcine)  5,000 Units Subcutaneous 3 times per day    insulin lispro  0-6 Units Subcutaneous 4 times per day    atropine  0.4 mg Intravenous Once    lidocaine 1 % injection  5 mL Intradermal Once    sodium chloride flush  10 mL Intravenous 2 times per day    nicotine  1 patch Transdermal Daily    amLODIPine  10 mg Oral Daily    famotidine  20 mg Oral BID    cefTRIAXone (ROCEPHIN) IV  2 g Intravenous Q24H    sodium chloride flush  10 mL Intravenous 2 times per day    atorvastatin  40 mg Oral Nightly       Thank you for allowing us to participate in the care of this patient. Please call with questions.     Radha Lui MD  Pager: [phone]  - Office: [phone]

## 2018-02-05 NOTE — FLOWSHEET NOTE
Spoke with Dr Uma Serna, pt hypertensive, ok to give one time dose of Catapres po, see orders. Spoke with RN caring for patient, will continue to monitor.

## 2018-02-05 NOTE — PROGRESS NOTES
Nutrition Assessment    Type and Reason for Visit: Reassess    Nutrition Recommendations: Vital HP (Low calorie, high protein) at 30 ml per hour provides 720 kcal, 63 g protein. This with diprivan calorie this is adequate nutrition 0726-7137 kcal    Malnutrition Assessment:  · Malnutrition Status: No malnutrition    Nutrition Diagnosis:   · Problem: Inadequate oral intake  · Etiology: related to Impaired respiratory function-inability to consume food     Signs and symptoms:  as evidenced by NPO status due to medical condition, Nutrition support - EN    Nutrition Assessment:  · Subjective Assessment: Pt continues on vent, tolerating tube feed at goal. Diprivan continues at 21 ml per hour. · Current Nutrition Therapies:  · Oral Diet Orders: NPO   · Tube Feeding (TF) Orders:   · Feeding Route:  OG  · Formula: Low Calorie, High Protein  · Rate (ml/hr):30 ml/hr     · Volume (ml/day):  720 ml  · Duration: Continuous 24hrs  · TF Residuals: Less than or equal to 250ml  · Current TF & Flush Orders Provides: 720 kcal and 63 g pro per day   · Additional Calories: diprivan at 22-27 ml per hour (580-712 kcal)  · Anthropometric Measures:  · Ht: 5' (152.4 cm)   · Current Body Wt: 337 lb 8.4 oz (153.1 kg)  · Admission Body Wt: 338 lb (153.3 kg)  · Ideal Body Wt: 100 lb (45.4 kg), % Ideal Body 338%  · Adjusted Body Wt: 176 lb (79.8 kg), body weight adjusted for Obesity  · BMI Classification: BMI > or equal to 40.0 Obese Class III  · Comparative Standards (Estimated Nutrition Needs):  · Estimated Daily Total Kcal: 3636-7221 kcal per day   · Estimated Daily Protein (g): 65-80 g pro per day   Estimated Intake vs Estimated Needs: Intake Meets Needs    Nutrition Risk Level:  Moderate    Nutrition Interventions:   Continue current Tube Feeding  Continued Inpatient Monitoring    Nutrition Evaluation:   · Evaluation: Goal achieved   · Goals: Meet 75% of nutrition needs   · Monitoring: TF Intake, TF Tolerance    See Adult Nutrition Doc Flowsheet for more detail.      Electronically signed by Ajith Madsen RD, YOGESH on 2/5/18 at 3:33 PM    Contact Number: [phone]

## 2018-02-06 ENCOUNTER — APPOINTMENT (OUTPATIENT)
Dept: GENERAL RADIOLOGY | Age: 43
DRG: 005 | End: 2018-02-06
Payer: MEDICARE

## 2018-02-06 LAB
ACTION: NORMAL
ALLEN TEST: ABNORMAL
ANION GAP SERPL CALCULATED.3IONS-SCNC: 10 MMOL/L (ref 9–17)
ANION GAP SERPL CALCULATED.3IONS-SCNC: 12 MMOL/L (ref 9–17)
ANION GAP SERPL CALCULATED.3IONS-SCNC: 8 MMOL/L (ref 9–17)
BUN BLDV-MCNC: 47 MG/DL (ref 6–20)
BUN BLDV-MCNC: 50 MG/DL (ref 6–20)
BUN BLDV-MCNC: 52 MG/DL (ref 6–20)
BUN/CREAT BLD: ABNORMAL (ref 9–20)
CALCIUM IONIZED: 1.15 MMOL/L (ref 1.13–1.33)
CALCIUM SERPL-MCNC: 7.9 MG/DL (ref 8.6–10.4)
CALCIUM SERPL-MCNC: 7.9 MG/DL (ref 8.6–10.4)
CALCIUM SERPL-MCNC: 8.1 MG/DL (ref 8.6–10.4)
CHLORIDE BLD-SCNC: 105 MMOL/L (ref 98–107)
CHLORIDE BLD-SCNC: 106 MMOL/L (ref 98–107)
CHLORIDE BLD-SCNC: 107 MMOL/L (ref 98–107)
CO2: 29 MMOL/L (ref 20–31)
CO2: 29 MMOL/L (ref 20–31)
CO2: 30 MMOL/L (ref 20–31)
CREAT SERPL-MCNC: 0.68 MG/DL (ref 0.5–0.9)
CREAT SERPL-MCNC: 0.87 MG/DL (ref 0.5–0.9)
CREAT SERPL-MCNC: 0.92 MG/DL (ref 0.5–0.9)
CULTURE: NORMAL
DATE AND TIME: NORMAL
DIRECT EXAM: NORMAL
FIO2: 100
FIO2: 60
FIO2: 65
FIO2: 80
FIO2: 80
GFR AFRICAN AMERICAN: >60 ML/MIN
GFR NON-AFRICAN AMERICAN: >60 ML/MIN
GFR SERPL CREATININE-BSD FRML MDRD: ABNORMAL ML/MIN/{1.73_M2}
GLUCOSE BLD-MCNC: 113 MG/DL (ref 65–105)
GLUCOSE BLD-MCNC: 119 MG/DL (ref 65–105)
GLUCOSE BLD-MCNC: 122 MG/DL (ref 70–99)
GLUCOSE BLD-MCNC: 133 MG/DL (ref 65–105)
GLUCOSE BLD-MCNC: 137 MG/DL (ref 70–99)
GLUCOSE BLD-MCNC: 155 MG/DL (ref 70–99)
HCT VFR BLD CALC: 30.3 % (ref 36.3–47.1)
HEMOGLOBIN: 8.4 G/DL (ref 11.9–15.1)
Lab: NORMAL
Lab: NORMAL
MAGNESIUM: 2.3 MG/DL (ref 1.6–2.6)
MAGNESIUM: 2.6 MG/DL (ref 1.6–2.6)
MCH RBC QN AUTO: 25.5 PG (ref 25.2–33.5)
MCHC RBC AUTO-ENTMCNC: 27.5 G/DL (ref 28.4–34.8)
MCV RBC AUTO: 91.8 FL (ref 82.6–102.9)
METHEMOGLOBIN: 1 % (ref 0–1.5)
MODE: ABNORMAL
NEGATIVE BASE EXCESS, ART: ABNORMAL (ref 0–2)
NOTIFY: NORMAL
NRBC AUTOMATED: 0 PER 100 WBC
O2 DEVICE/FLOW/%: ABNORMAL
PATIENT TEMP: ABNORMAL
PDW BLD-RTO: 15.2 % (ref 11.8–14.4)
PHOSPHORUS: 3.1 MG/DL (ref 2.6–4.5)
PHOSPHORUS: 5.8 MG/DL (ref 2.6–4.5)
PLATELET # BLD: 195 K/UL (ref 138–453)
PMV BLD AUTO: 10.9 FL (ref 8.1–13.5)
POC HCO3: 31.4 MMOL/L (ref 21–28)
POC HCO3: 31.4 MMOL/L (ref 21–28)
POC HCO3: 33.1 MMOL/L (ref 21–28)
POC HCO3: 33.9 MMOL/L (ref 21–28)
POC HCO3: 35.6 MMOL/L (ref 21–28)
POC O2 SATURATION: 92 % (ref 94–98)
POC O2 SATURATION: 92 % (ref 94–98)
POC O2 SATURATION: 93 % (ref 94–98)
POC O2 SATURATION: 93 % (ref 94–98)
POC O2 SATURATION: 94 % (ref 94–98)
POC PCO2 TEMP: ABNORMAL MM HG
POC PCO2: 37.8 MM HG (ref 35–48)
POC PCO2: 38.8 MM HG (ref 35–48)
POC PCO2: 46.6 MM HG (ref 35–48)
POC PCO2: 72.5 MM HG (ref 35–48)
POC PCO2: 77.3 MM HG (ref 35–48)
POC PH TEMP: ABNORMAL
POC PH: 7.25 (ref 7.35–7.45)
POC PH: 7.3 (ref 7.35–7.45)
POC PH: 7.46 (ref 7.35–7.45)
POC PH: 7.52 (ref 7.35–7.45)
POC PH: 7.53 (ref 7.35–7.45)
POC PO2 TEMP: ABNORMAL MM HG
POC PO2: 59.8 MM HG (ref 83–108)
POC PO2: 61.8 MM HG (ref 83–108)
POC PO2: 64.8 MM HG (ref 83–108)
POC PO2: 76.1 MM HG (ref 83–108)
POC PO2: 76.7 MM HG (ref 83–108)
POSITIVE BASE EXCESS, ART: 4 (ref 0–3)
POSITIVE BASE EXCESS, ART: 8 (ref 0–3)
POTASSIUM SERPL-SCNC: 4 MMOL/L (ref 3.7–5.3)
POTASSIUM SERPL-SCNC: 4.5 MMOL/L (ref 3.7–5.3)
POTASSIUM SERPL-SCNC: 4.5 MMOL/L (ref 3.7–5.3)
RBC # BLD: 3.3 M/UL (ref 3.95–5.11)
READ BACK: YES
SAMPLE SITE: ABNORMAL
SODIUM BLD-SCNC: 143 MMOL/L (ref 135–144)
SODIUM BLD-SCNC: 146 MMOL/L (ref 135–144)
SODIUM BLD-SCNC: 147 MMOL/L (ref 135–144)
SPECIMEN DESCRIPTION: NORMAL
SPECIMEN DESCRIPTION: NORMAL
STATUS: NORMAL
STATUS: NORMAL
TCO2 (CALC), ART: 33 MMOL/L (ref 22–29)
TCO2 (CALC), ART: 33 MMOL/L (ref 22–29)
TCO2 (CALC), ART: 35 MMOL/L (ref 22–29)
TCO2 (CALC), ART: 36 MMOL/L (ref 22–29)
TCO2 (CALC), ART: 38 MMOL/L (ref 22–29)
TROPONIN INTERP: NORMAL
TROPONIN T: <0.03 NG/ML
WBC # BLD: 12.2 K/UL (ref 3.5–11.3)

## 2018-02-06 PROCEDURE — 84100 ASSAY OF PHOSPHORUS: CPT

## 2018-02-06 PROCEDURE — 36415 COLL VENOUS BLD VENIPUNCTURE: CPT

## 2018-02-06 PROCEDURE — 6360000002 HC RX W HCPCS: Performed by: STUDENT IN AN ORGANIZED HEALTH CARE EDUCATION/TRAINING PROGRAM

## 2018-02-06 PROCEDURE — 83735 ASSAY OF MAGNESIUM: CPT

## 2018-02-06 PROCEDURE — 2580000003 HC RX 258: Performed by: STUDENT IN AN ORGANIZED HEALTH CARE EDUCATION/TRAINING PROGRAM

## 2018-02-06 PROCEDURE — 4100000001 HC NITRIC OX DAILY

## 2018-02-06 PROCEDURE — 2000000000 HC ICU R&B

## 2018-02-06 PROCEDURE — 99211 OFF/OP EST MAY X REQ PHY/QHP: CPT

## 2018-02-06 PROCEDURE — 6370000000 HC RX 637 (ALT 250 FOR IP): Performed by: INTERNAL MEDICINE

## 2018-02-06 PROCEDURE — 6370000000 HC RX 637 (ALT 250 FOR IP): Performed by: STUDENT IN AN ORGANIZED HEALTH CARE EDUCATION/TRAINING PROGRAM

## 2018-02-06 PROCEDURE — 94762 N-INVAS EAR/PLS OXIMTRY CONT: CPT

## 2018-02-06 PROCEDURE — 6370000000 HC RX 637 (ALT 250 FOR IP): Performed by: HOSPITALIST

## 2018-02-06 PROCEDURE — 2580000003 HC RX 258: Performed by: INTERNAL MEDICINE

## 2018-02-06 PROCEDURE — 82803 BLOOD GASES ANY COMBINATION: CPT

## 2018-02-06 PROCEDURE — 36620 INSERTION CATHETER ARTERY: CPT

## 2018-02-06 PROCEDURE — 99233 SBSQ HOSP IP/OBS HIGH 50: CPT | Performed by: INTERNAL MEDICINE

## 2018-02-06 PROCEDURE — 2500000003 HC RX 250 WO HCPCS: Performed by: INTERNAL MEDICINE

## 2018-02-06 PROCEDURE — 99291 CRITICAL CARE FIRST HOUR: CPT | Performed by: INTERNAL MEDICINE

## 2018-02-06 PROCEDURE — 80048 BASIC METABOLIC PNL TOTAL CA: CPT

## 2018-02-06 PROCEDURE — 84484 ASSAY OF TROPONIN QUANT: CPT

## 2018-02-06 PROCEDURE — 94003 VENT MGMT INPAT SUBQ DAY: CPT

## 2018-02-06 PROCEDURE — 71045 X-RAY EXAM CHEST 1 VIEW: CPT

## 2018-02-06 PROCEDURE — 83050 HGB METHEMOGLOBIN QUAN: CPT

## 2018-02-06 PROCEDURE — 85027 COMPLETE CBC AUTOMATED: CPT

## 2018-02-06 PROCEDURE — 31500 INSERT EMERGENCY AIRWAY: CPT | Performed by: ANESTHESIOLOGY

## 2018-02-06 PROCEDURE — 82947 ASSAY GLUCOSE BLOOD QUANT: CPT

## 2018-02-06 PROCEDURE — 93005 ELECTROCARDIOGRAM TRACING: CPT

## 2018-02-06 PROCEDURE — 82330 ASSAY OF CALCIUM: CPT

## 2018-02-06 RX ORDER — GLYCOPYRROLATE 0.2 MG/ML
0.1 INJECTION INTRAMUSCULAR; INTRAVENOUS 2 TIMES DAILY
Status: DISCONTINUED | OUTPATIENT
Start: 2018-02-06 | End: 2018-02-10

## 2018-02-06 RX ORDER — SODIUM CHLORIDE 9 MG/ML
INJECTION, SOLUTION INTRAVENOUS CONTINUOUS
Status: DISCONTINUED | OUTPATIENT
Start: 2018-02-06 | End: 2018-02-23 | Stop reason: HOSPADM

## 2018-02-06 RX ADMIN — HYDRALAZINE HYDROCHLORIDE 100 MG: 50 TABLET, FILM COATED ORAL at 14:32

## 2018-02-06 RX ADMIN — SODIUM CHLORIDE 500 ML: 4.5 INJECTION, SOLUTION INTRAVENOUS at 12:04

## 2018-02-06 RX ADMIN — Medication 125 MCG/HR: at 18:50

## 2018-02-06 RX ADMIN — GLYCOPYRROLATE 0.1 MG: 0.2 INJECTION INTRAMUSCULAR; INTRAVENOUS at 09:08

## 2018-02-06 RX ADMIN — CISATRACURIUM BESYLATE 3 MCG/KG/MIN: 10 INJECTION INTRAVENOUS at 23:13

## 2018-02-06 RX ADMIN — GLYCOPYRROLATE 0.1 MG: 0.2 INJECTION INTRAMUSCULAR; INTRAVENOUS at 20:04

## 2018-02-06 RX ADMIN — PROPOFOL 35 MCG/KG/MIN: 10 INJECTION, EMULSION INTRAVENOUS at 22:10

## 2018-02-06 RX ADMIN — CISATRACURIUM BESYLATE 2.5 MCG/KG/MIN: 10 INJECTION INTRAVENOUS at 16:03

## 2018-02-06 RX ADMIN — PROPOFOL 35 MCG/KG/MIN: 10 INJECTION, EMULSION INTRAVENOUS at 18:50

## 2018-02-06 RX ADMIN — Medication 10 ML: at 20:04

## 2018-02-06 RX ADMIN — FENTANYL CITRATE 100 MCG: 50 INJECTION INTRAMUSCULAR; INTRAVENOUS at 17:29

## 2018-02-06 RX ADMIN — Medication 100 MCG/HR: at 07:41

## 2018-02-06 RX ADMIN — HYDRALAZINE HYDROCHLORIDE 20 MG: 20 INJECTION INTRAMUSCULAR; INTRAVENOUS at 18:12

## 2018-02-06 RX ADMIN — DESMOPRESSIN ACETATE 40 MG: 0.2 TABLET ORAL at 20:03

## 2018-02-06 RX ADMIN — DEXTROSE MONOHYDRATE: 50 INJECTION, SOLUTION INTRAVENOUS at 07:39

## 2018-02-06 RX ADMIN — PROPOFOL 35 MCG/KG/MIN: 10 INJECTION, EMULSION INTRAVENOUS at 15:59

## 2018-02-06 RX ADMIN — HEPARIN SODIUM 5000 UNITS: 5000 INJECTION, SOLUTION INTRAVENOUS; SUBCUTANEOUS at 21:58

## 2018-02-06 RX ADMIN — FAMOTIDINE 20 MG: 20 TABLET, FILM COATED ORAL at 20:04

## 2018-02-06 RX ADMIN — CISATRACURIUM BESYLATE 2.5 MCG/KG/MIN: 10 INJECTION INTRAVENOUS at 02:17

## 2018-02-06 RX ADMIN — HEPARIN SODIUM 5000 UNITS: 5000 INJECTION, SOLUTION INTRAVENOUS; SUBCUTANEOUS at 14:15

## 2018-02-06 RX ADMIN — AMLODIPINE BESYLATE 10 MG: 10 TABLET ORAL at 08:52

## 2018-02-06 RX ADMIN — LISINOPRIL 20 MG: 20 TABLET ORAL at 08:52

## 2018-02-06 RX ADMIN — INSULIN LISPRO 1 UNITS: 100 INJECTION, SOLUTION INTRAVENOUS; SUBCUTANEOUS at 06:26

## 2018-02-06 RX ADMIN — PROPOFOL 30 MCG/KG/MIN: 10 INJECTION, EMULSION INTRAVENOUS at 05:38

## 2018-02-06 RX ADMIN — HYDRALAZINE HYDROCHLORIDE 100 MG: 50 TABLET, FILM COATED ORAL at 21:57

## 2018-02-06 RX ADMIN — Medication 10 ML: at 20:09

## 2018-02-06 RX ADMIN — PROPOFOL 30 MCG/KG/MIN: 10 INJECTION, EMULSION INTRAVENOUS at 07:38

## 2018-02-06 RX ADMIN — SODIUM CHLORIDE: 9 INJECTION, SOLUTION INTRAVENOUS at 08:40

## 2018-02-06 RX ADMIN — PREDNISONE 20 MG: 20 TABLET ORAL at 08:52

## 2018-02-06 RX ADMIN — HYDRALAZINE HYDROCHLORIDE 100 MG: 50 TABLET, FILM COATED ORAL at 06:10

## 2018-02-06 RX ADMIN — HEPARIN SODIUM 5000 UNITS: 5000 INJECTION, SOLUTION INTRAVENOUS; SUBCUTANEOUS at 06:11

## 2018-02-06 RX ADMIN — PROPOFOL 30 MCG/KG/MIN: 10 INJECTION, EMULSION INTRAVENOUS at 11:54

## 2018-02-06 RX ADMIN — FAMOTIDINE 20 MG: 20 TABLET, FILM COATED ORAL at 08:52

## 2018-02-06 RX ADMIN — PROPOFOL 30 MCG/KG/MIN: 10 INJECTION, EMULSION INTRAVENOUS at 01:10

## 2018-02-06 ASSESSMENT — PAIN SCALES - GENERAL
PAINLEVEL_OUTOF10: 0
PAINLEVEL_OUTOF10: 0

## 2018-02-06 NOTE — PROGRESS NOTES
Pt in [address] on monitor. Crit care notified. EKG, bmp, mg, phos, and trop ordered. RN will continue to monitor.

## 2018-02-06 NOTE — PROGRESS NOTES
Arterial line inserted after losing waveform and ability to draw after several attempts to re-position. Line inserted to right radial after discussing with PICC RN.

## 2018-02-06 NOTE — PROGRESS NOTES
Insert Arterial Line  Date/Time:  02/06/18, 1:11 PM  Performed by: Sussy Rapp    Patient identity confirmed: arm band and provided demographic data   Time out: Immediately prior to procedure a \"time out\" was called to verify the correct patient, procedure, equipment, support staff. Preparation: Patient was prepped and draped in the usual sterile fashion.     Location:right radial    Aubrey's test normal: yes  Needle gauge: 20     Number of attempts: 1  Post-procedure: transparent dressing applied and line secured    Patient tolerance: well

## 2018-02-06 NOTE — PROGRESS NOTES
Date    PROTIME 11.0 01/26/2018    INR 1.0 01/26/2018     PTT:    Lab Results   Component Value Date    APTT 30.2 01/27/2018       Comprehensive Metabolic Profile:   Recent Labs      02/05/18   0606  02/05/18   1155  02/06/18   0045  02/06/18   0511   NA  151*  149*  146*  147*   K  4.0   --   4.5  4.5   CL  109*   --   105  107   CO2  31   --   29  30   BUN  43*   --   50*  52*   CREATININE  0.76   --   0.87  0.92*   GLUCOSE  146*   --   122*  155*   CALCIUM  8.0*   --   7.9*  7.9*      Magnesium:   Lab Results   Component Value Date    MG 2.6 02/06/2018     Phosphorus:   Lab Results   Component Value Date    PHOS 5.8 02/06/2018     Ionized Calcium:   Lab Results   Component Value Date    CAION 1.15 02/06/2018        Radiology/Imaging:     Chest Xray (2/6/2018): Fairly stable diffuse patchy airspace and interstitial opacities. 1/27/2018 echocardiogram  CONCLUSIONS    Summary  Technically difficult study. All segments not well seen. No comment can be  made regarding specific wall motion. LV chamber dimension is normal. Systolic function appears to be hyperdynamic  with an estimated EF of 65%. Right ventricular dilatation with normal systolic function. No significant valvular regurgitation or stenosis seen.     ASSESSMENT:     Principal Problem:    ARDS (adult respiratory distress syndrome) (HCC)  Active Problems:    Pneumonia of both lower lobes due to infectious organism    NSTEMI (non-ST elevated myocardial infarction) ([facility] [address]    Acute pulmonary edema (HCC)    Hypertensive emergency    Acute respiratory failure with hypoxia and hypercapnia ([facility])    Smoker    Morbid obesity (HCC)    Elevated troponin    Obesity hypoventilation syndrome (HCC)    SOB (shortness of breath)    COPD exacerbation (HCC)    Fever    Disease due to rhinovirus       PLAN:     WEAN PER PROTOCOL:  [] No   [x] Yes  [] N/A    DISCONTINUE ANY LABS:   [x] No   [] Yes    ICU PROPHYLAXIS:  Stress ulcer:  [] PPI Agent  [x] [id number] []

## 2018-02-07 ENCOUNTER — APPOINTMENT (OUTPATIENT)
Dept: GENERAL RADIOLOGY | Age: 43
DRG: 005 | End: 2018-02-07
Payer: MEDICARE

## 2018-02-07 LAB
ALLEN TEST: ABNORMAL
ANION GAP SERPL CALCULATED.3IONS-SCNC: 12 MMOL/L (ref 9–17)
BUN BLDV-MCNC: 49 MG/DL (ref 6–20)
BUN/CREAT BLD: ABNORMAL (ref 9–20)
CALCIUM SERPL-MCNC: 8.1 MG/DL (ref 8.6–10.4)
CHLORIDE BLD-SCNC: 105 MMOL/L (ref 98–107)
CO2: 28 MMOL/L (ref 20–31)
CREAT SERPL-MCNC: 0.83 MG/DL (ref 0.5–0.9)
DIRECT EXAM: ABNORMAL
DIRECT EXAM: NORMAL
EKG ATRIAL RATE: 308 BPM
EKG ATRIAL RATE: 79 BPM
EKG P AXIS: 24 DEGREES
EKG P AXIS: 83 DEGREES
EKG P-R INTERVAL: 118 MS
EKG Q-T INTERVAL: 380 MS
EKG Q-T INTERVAL: 402 MS
EKG QRS DURATION: 68 MS
EKG QRS DURATION: 86 MS
EKG QTC CALCULATION (BAZETT): 398 MS
EKG QTC CALCULATION (BAZETT): 460 MS
EKG R AXIS: 21 DEGREES
EKG R AXIS: 35 DEGREES
EKG T AXIS: 35 DEGREES
EKG T AXIS: 65 DEGREES
EKG VENTRICULAR RATE: 66 BPM
EKG VENTRICULAR RATE: 79 BPM
FIO2: 60
GFR AFRICAN AMERICAN: >60 ML/MIN
GFR NON-AFRICAN AMERICAN: >60 ML/MIN
GFR SERPL CREATININE-BSD FRML MDRD: ABNORMAL ML/MIN/{1.73_M2}
GFR SERPL CREATININE-BSD FRML MDRD: ABNORMAL ML/MIN/{1.73_M2}
GLUCOSE BLD-MCNC: 117 MG/DL (ref 65–105)
GLUCOSE BLD-MCNC: 122 MG/DL (ref 70–99)
GLUCOSE BLD-MCNC: 127 MG/DL (ref 74–100)
GLUCOSE BLD-MCNC: 131 MG/DL (ref 65–105)
GLUCOSE BLD-MCNC: 99 MG/DL (ref 65–105)
HCT VFR BLD CALC: 30 % (ref 36.3–47.1)
HEMOGLOBIN: 8.7 G/DL (ref 11.9–15.1)
Lab: ABNORMAL
Lab: NORMAL
MCH RBC QN AUTO: 25.4 PG (ref 25.2–33.5)
MCHC RBC AUTO-ENTMCNC: 29 G/DL (ref 28.4–34.8)
MCV RBC AUTO: 87.5 FL (ref 82.6–102.9)
METHEMOGLOBIN: 0.9 % (ref 0–1.5)
MODE: ABNORMAL
NEGATIVE BASE EXCESS, ART: ABNORMAL (ref 0–2)
NRBC AUTOMATED: 0 PER 100 WBC
O2 DEVICE/FLOW/%: ABNORMAL
PATIENT TEMP: ABNORMAL
PDW BLD-RTO: 15.6 % (ref 11.8–14.4)
PLATELET # BLD: 245 K/UL (ref 138–453)
PMV BLD AUTO: 11.2 FL (ref 8.1–13.5)
POC HCO3: 30.6 MMOL/L (ref 21–28)
POC O2 SATURATION: 90 % (ref 94–98)
POC PCO2 TEMP: ABNORMAL MM HG
POC PCO2: 48.1 MM HG (ref 35–48)
POC PH TEMP: ABNORMAL
POC PH: 7.41 (ref 7.35–7.45)
POC PO2 TEMP: ABNORMAL MM HG
POC PO2: 58.5 MM HG (ref 83–108)
POSITIVE BASE EXCESS, ART: 5 (ref 0–3)
POTASSIUM SERPL-SCNC: 4.1 MMOL/L (ref 3.7–5.3)
RBC # BLD: 3.43 M/UL (ref 3.95–5.11)
SAMPLE SITE: ABNORMAL
SODIUM BLD-SCNC: 145 MMOL/L (ref 135–144)
SPECIMEN DESCRIPTION: ABNORMAL
SPECIMEN DESCRIPTION: NORMAL
STATUS: ABNORMAL
STATUS: NORMAL
TCO2 (CALC), ART: 32 MMOL/L (ref 22–29)
WBC # BLD: 15.2 K/UL (ref 3.5–11.3)

## 2018-02-07 PROCEDURE — 6370000000 HC RX 637 (ALT 250 FOR IP): Performed by: HOSPITALIST

## 2018-02-07 PROCEDURE — 87480 CANDIDA DNA DIR PROBE: CPT

## 2018-02-07 PROCEDURE — 2500000003 HC RX 250 WO HCPCS: Performed by: INTERNAL MEDICINE

## 2018-02-07 PROCEDURE — 6370000000 HC RX 637 (ALT 250 FOR IP): Performed by: INTERNAL MEDICINE

## 2018-02-07 PROCEDURE — 87491 CHLMYD TRACH DNA AMP PROBE: CPT

## 2018-02-07 PROCEDURE — 2500000003 HC RX 250 WO HCPCS

## 2018-02-07 PROCEDURE — 80048 BASIC METABOLIC PNL TOTAL CA: CPT

## 2018-02-07 PROCEDURE — 82947 ASSAY GLUCOSE BLOOD QUANT: CPT

## 2018-02-07 PROCEDURE — 99291 CRITICAL CARE FIRST HOUR: CPT | Performed by: INTERNAL MEDICINE

## 2018-02-07 PROCEDURE — 4100000001 HC NITRIC OX DAILY

## 2018-02-07 PROCEDURE — 2000000000 HC ICU R&B

## 2018-02-07 PROCEDURE — 87591 N.GONORRHOEAE DNA AMP PROB: CPT

## 2018-02-07 PROCEDURE — 71045 X-RAY EXAM CHEST 1 VIEW: CPT

## 2018-02-07 PROCEDURE — 87660 TRICHOMONAS VAGIN DIR PROBE: CPT

## 2018-02-07 PROCEDURE — 36415 COLL VENOUS BLD VENIPUNCTURE: CPT

## 2018-02-07 PROCEDURE — 6360000002 HC RX W HCPCS: Performed by: STUDENT IN AN ORGANIZED HEALTH CARE EDUCATION/TRAINING PROGRAM

## 2018-02-07 PROCEDURE — 87210 SMEAR WET MOUNT SALINE/INK: CPT

## 2018-02-07 PROCEDURE — 85027 COMPLETE CBC AUTOMATED: CPT

## 2018-02-07 PROCEDURE — 82803 BLOOD GASES ANY COMBINATION: CPT

## 2018-02-07 PROCEDURE — 94003 VENT MGMT INPAT SUBQ DAY: CPT

## 2018-02-07 PROCEDURE — 87510 GARDNER VAG DNA DIR PROBE: CPT

## 2018-02-07 PROCEDURE — 83050 HGB METHEMOGLOBIN QUAN: CPT

## 2018-02-07 PROCEDURE — 94762 N-INVAS EAR/PLS OXIMTRY CONT: CPT

## 2018-02-07 PROCEDURE — 2580000003 HC RX 258: Performed by: INTERNAL MEDICINE

## 2018-02-07 PROCEDURE — 6360000002 HC RX W HCPCS: Performed by: INTERNAL MEDICINE

## 2018-02-07 PROCEDURE — 94640 AIRWAY INHALATION TREATMENT: CPT

## 2018-02-07 RX ORDER — 0.9 % SODIUM CHLORIDE 0.9 %
250 INTRAVENOUS SOLUTION INTRAVENOUS ONCE
Status: COMPLETED | OUTPATIENT
Start: 2018-02-07 | End: 2018-02-07

## 2018-02-07 RX ORDER — HYDRALAZINE HYDROCHLORIDE 20 MG/ML
10 INJECTION INTRAMUSCULAR; INTRAVENOUS EVERY 4 HOURS PRN
Status: DISCONTINUED | OUTPATIENT
Start: 2018-02-07 | End: 2018-02-17

## 2018-02-07 RX ORDER — HYDRALAZINE HYDROCHLORIDE 20 MG/ML
10 INJECTION INTRAMUSCULAR; INTRAVENOUS ONCE
Status: COMPLETED | OUTPATIENT
Start: 2018-02-07 | End: 2018-02-07

## 2018-02-07 RX ORDER — FLUCONAZOLE 2 MG/ML
200 INJECTION, SOLUTION INTRAVENOUS ONCE
Status: COMPLETED | OUTPATIENT
Start: 2018-02-07 | End: 2018-02-08

## 2018-02-07 RX ADMIN — HEPARIN SODIUM 5000 UNITS: 5000 INJECTION, SOLUTION INTRAVENOUS; SUBCUTANEOUS at 21:41

## 2018-02-07 RX ADMIN — HEPARIN SODIUM 5000 UNITS: 5000 INJECTION, SOLUTION INTRAVENOUS; SUBCUTANEOUS at 17:06

## 2018-02-07 RX ADMIN — CISATRACURIUM BESYLATE 3.25 MCG/KG/MIN: 10 INJECTION INTRAVENOUS at 14:31

## 2018-02-07 RX ADMIN — Medication 2 MCG/MIN: at 16:45

## 2018-02-07 RX ADMIN — FAMOTIDINE 20 MG: 20 TABLET, FILM COATED ORAL at 08:50

## 2018-02-07 RX ADMIN — Medication 100 MCG/HR: at 11:57

## 2018-02-07 RX ADMIN — PROPOFOL 40 MCG/KG/MIN: 10 INJECTION, EMULSION INTRAVENOUS at 04:20

## 2018-02-07 RX ADMIN — PROPOFOL 35 MCG/KG/MIN: 10 INJECTION, EMULSION INTRAVENOUS at 14:46

## 2018-02-07 RX ADMIN — GLYCOPYRROLATE 0.1 MG: 0.2 INJECTION INTRAMUSCULAR; INTRAVENOUS at 20:42

## 2018-02-07 RX ADMIN — Medication 125 MCG/HR: at 02:39

## 2018-02-07 RX ADMIN — PROPOFOL 40 MCG/KG/MIN: 10 INJECTION, EMULSION INTRAVENOUS at 17:34

## 2018-02-07 RX ADMIN — FAMOTIDINE 20 MG: 20 TABLET, FILM COATED ORAL at 20:43

## 2018-02-07 RX ADMIN — DESMOPRESSIN ACETATE 40 MG: 0.2 TABLET ORAL at 20:43

## 2018-02-07 RX ADMIN — PROPOFOL 40 MCG/KG/MIN: 10 INJECTION, EMULSION INTRAVENOUS at 23:00

## 2018-02-07 RX ADMIN — PREDNISONE 10 MG: 10 TABLET ORAL at 08:50

## 2018-02-07 RX ADMIN — PROPOFOL 35 MCG/KG/MIN: 10 INJECTION, EMULSION INTRAVENOUS at 10:49

## 2018-02-07 RX ADMIN — Medication 100 MCG/HR: at 21:41

## 2018-02-07 RX ADMIN — HYDRALAZINE HYDROCHLORIDE 10 MG: 20 INJECTION INTRAMUSCULAR; INTRAVENOUS at 12:26

## 2018-02-07 RX ADMIN — PROPOFOL 40 MCG/KG/MIN: 10 INJECTION, EMULSION INTRAVENOUS at 20:21

## 2018-02-07 RX ADMIN — SODIUM CHLORIDE 250 ML: 9 INJECTION, SOLUTION INTRAVENOUS at 03:29

## 2018-02-07 RX ADMIN — PROPOFOL 35 MCG/KG/MIN: 10 INJECTION, EMULSION INTRAVENOUS at 01:00

## 2018-02-07 RX ADMIN — PROPOFOL 30 MCG/KG/MIN: 10 INJECTION, EMULSION INTRAVENOUS at 07:45

## 2018-02-07 RX ADMIN — CISATRACURIUM BESYLATE 3 MCG/KG/MIN: 10 INJECTION INTRAVENOUS at 21:40

## 2018-02-07 RX ADMIN — GLYCOPYRROLATE 0.1 MG: 0.2 INJECTION INTRAMUSCULAR; INTRAVENOUS at 08:50

## 2018-02-07 RX ADMIN — HYDRALAZINE HYDROCHLORIDE 10 MG: 20 INJECTION INTRAMUSCULAR; INTRAVENOUS at 12:04

## 2018-02-07 RX ADMIN — SODIUM CHLORIDE: 9 INJECTION, SOLUTION INTRAVENOUS at 08:47

## 2018-02-07 RX ADMIN — CISATRACURIUM BESYLATE 3.5 MCG/KG/MIN: 10 INJECTION INTRAVENOUS at 06:49

## 2018-02-07 RX ADMIN — HEPARIN SODIUM 5000 UNITS: 5000 INJECTION, SOLUTION INTRAVENOUS; SUBCUTANEOUS at 06:11

## 2018-02-07 RX ADMIN — HYDRALAZINE HYDROCHLORIDE 20 MG: 20 INJECTION INTRAMUSCULAR; INTRAVENOUS at 02:38

## 2018-02-07 ASSESSMENT — PAIN SCALES - GENERAL
PAINLEVEL_OUTOF10: 0

## 2018-02-07 NOTE — PROGRESS NOTES
[facility]  Occupational Therapy Not Seen Note    Patient not available for Occupational Therapy due to:    [] Testing:    [] Hemodialysis    [] Blood Transfusion in Progress    []Refusal by Patient:    [] Surgery/Procedure:    [] Strict Bedrest    [x] Sedation/ intubation    [] Spine Precautions     [] Pt being transferred to palliative care at this time. Spoke with pt/family and OT services to be defered. [] Pt independent with functional mobility and functional tasks.  Pt with no OT acute care needs at this time, will defer OT eval.    [] Other    Next Scheduled Treatment: Re-check 2/9/2018    Signature: HANDY Key/MARION

## 2018-02-07 NOTE — PROGRESS NOTES
02/07/18 0045   Vent Information   Nitric Oxide/Epoprostenol In Use? No   NO Set 0     Nitric turned off. Tolerated well by patient.  RN informed

## 2018-02-07 NOTE — PROGRESS NOTES
1630 - Pt HR dropped from 105 to 60 during turn to the right side for linen change. Once pt was repostioned and laying on her left side, her SBP dropped to the 50s. Critical care resident notified. Levophed ordered. RN will continue to monitor.

## 2018-02-07 NOTE — PLAN OF CARE
Problem: Anxiety/Stress:  Goal: Level of anxiety will decrease  Level of anxiety will decrease   Outcome: Ongoing      Problem: Gas Exchange - Impaired:  Goal: Levels of oxygenation will improve  Levels of oxygenation will improve   Outcome: Ongoing      Problem: Pain:  Goal: Pain level will decrease  Pain level will decrease    Outcome: Ongoing    Goal: Recognizes and communicates pain  Recognizes and communicates pain   Outcome: Ongoing    Goal: Control of acute pain  Control of acute pain   Outcome: Ongoing    Goal: Control of chronic pain  Control of chronic pain   Outcome: Ongoing      Problem: Falls - Risk of  Goal: Absence of falls  Outcome: Ongoing      Problem: Risk for Impaired Skin Integrity  Goal: Tissue integrity - skin and mucous membranes  Structural intactness and normal physiological function of skin and  mucous membranes.    Outcome: Ongoing      Problem: Pain:  Goal: Control of acute pain  Control of acute pain   Outcome: Ongoing    Goal: Control of chronic pain  Control of chronic pain   Outcome: Ongoing    Goal: Pain level will decrease  Pain level will decrease    Outcome: Ongoing      Problem: Nutrition  Goal: Optimal nutrition therapy  Outcome: Ongoing      Problem: ABCDS Injury Assessment  Goal: Absence of physical injury  Outcome: Ongoing      Problem: Neurological  Goal: Maximum potential motor/sensory/cognitive function  Outcome: Ongoing      Problem: OXYGENATION/RESPIRATORY FUNCTION  Goal: Patient will maintain patent airway  Outcome: Ongoing    Goal: Patient will achieve/maintain normal respiratory rate/effort  Respiratory rate and effort will be within normal limits for the patient   Outcome: Ongoing      Problem: MECHANICAL VENTILATION  Goal: Patient will maintain patent airway  Outcome: Ongoing    Goal: Oral health is maintained or improved  Outcome: Ongoing    Goal: ET tube will be managed safely  Outcome: Ongoing    Goal: Ability to express needs and understand

## 2018-02-07 NOTE — PROGRESS NOTES
Critical Care Team - Daily Progress Note      Date and time: 2/7/2018 8:00 AM  Patient's name:  Bryce Ibanez  Medical Record Number: [id number]  Patient's account/billing number: [de-identified]  Patient's YOB: 1975  Age: 43 y.o. Date of Admission: 1/26/2018  8:09 PM  Length of stay during current admission: 12    Primary Care Physician: Marjan Abreu  ICU Attending Physician: Dr. Gloria Carbajal MD    Code Status: Full Code    Reason for ICU admission: SOB, Pneumonia, Hypertensive Emergency. SUBJECTIVE:   Patient presented with COPD exacerbation and possible pneumonia requiring BiPAP. Patient was admitted to stepdown when she developed tachypnea and registered distress she was transferred to ICU she was high risk for intubation and required critical care. ICU course has been complicated by acute respiratory distress syndrome. Patient is currently intubated and ventilated requirin high Plateau and peak pressures likely related to poor compliance due to ARDS, in addition to obesity. Patient developed vent asynchrony and worsening oxygenation and was started on Nimbex. Repeat ABG show CO2 retention and acidemia. Patient continues to have significant hypertension especially after propofol was decreased and has been requiring Cardene in addition to Norvasc/Lisinopril/Hydralyzine    OVERNIGHT EVENTS:    Had episode of elevated blood pressure where her SBP increased to 250. Was bolused with propofol and pressure dropped to 80s. Was then bolused with 250 cc of fluid and her pressure increased again. Currently on propofol, Nimbex and fentanyl. Afebrile overnight. Total fluids of 87 cc/hr. UO good at 0.5 cc/kg/hr. New onset vaginal discharge.     AWAKE & FOLLOWING COMMANDS:  [x] No   [] Yes    CURRENT VENTILATION STATUS:     [x] High-frequency oscillator ventilation [] BIPAP 85% [] Nasal Cannula [] Room Air      IF INTUBATED, ET TUBE MARKING AT LOWER LIP:       cms    SECRETIONS Amount:  [] chloride 10 mEq PRN   sodium chloride flush 10 mL PRN   acetaminophen 650 mg Q4H PRN         VENT SETTINGS (Comprehensive) (if applicable):  Vent Information  Ventilator Started: Yes  Ventilation Day(s): 5  Vent Type: 3100B  Vent Mode: HFOV (hz 5, amp 60)  Vt Ordered: 320 mL  Vt Exhaled: 321 mL  Pressure Ordered: 10  Rate Set: 38 bmp  Rate Measured: 38 br/min  Minute Volume: 12.2 Liters  Pressure Support: 10 cmH20  FiO2 : 50 %  Peak Inspiratory Pressure: 39 cmH2O  I:E Ratio: 1:1.82  Sensitivity: 5  Flow by: 30  PEEP/CPAP: 12  I Time/ I Time %: 0.33 s  Mean Airway Pressure: 32 cmH20  Plateau Pressure: 35 cmH20  Static Compliance: 16 mL/cmH2O  Dynamic Compliance: 13 mL/cmH2O  Total PEEP: 15 cmH20  Auto PEEP: 1 cmH20  Nitric Oxide/Epoprostenol In Use?: No  NO Set: (S) 0  NO Analyzed: 0 ppm  NO2 Analyzed: 0 ppm  Additional Respiratory  Assessments  Pulse: 79  Resp: 11  SpO2: 98 %  End Tidal CO2: 70 (%)  Position: Semi-Blakely's  Humidification Temp: 37  HME (Heat moisture exchanger): No  Circuit Condensation: Drained  Oral Care Completed?: Yes  Oral Care: Mouthwash, Mouth moisturizer, Mouth suctioned  Subglottic Suction Done?: Yes    ABGs:   Updated: 02/07/18 0415     POC pH 7.412    POC pCO2 48.1 (H) mm Hg    POC PO2 58.5 (L) mm Hg    POC HCO3 30.6 (H) mmol/L    TCO2 (calc), Art 32 (H) mmol/L    Negative Base Excess, Art NOT REPORTED    Positive Base Excess, Art 5 (H)    POC O2 SAT 90 (L) %    O2 Device/Flow/% HFOV         Laboratory findings:    Complete Blood Count:   Recent Labs      02/05/18   0606  02/06/18   0511  02/07/18   0412   WBC  10.9  12.2*  15.2*   HGB  8.9*  8.4*  8.7*   HCT  31.5*  30.3*  30.0*   PLT  221  195  245        Last 3 Blood Glucose:   Recent Labs      02/06/18   0511  02/06/18   1521  02/07/18   0412   GLUCOSE  155*  137*  122*        PT/INR:    Lab Results   Component Value Date    PROTIME 11.0 01/26/2018    INR 1.0 01/26/2018     PTT:    Lab Results   Component Value Date    APTT 30.2 reassured them that I will keep them updated and and will be able to meet with them every day to give an update about patient's progress. even though she is critically ill and prognosis is guarded , She needs more time to assess whether therapy has been effective or not. Discussed with nursing staff in detail. Her blood pressure is labile. I believe that as the sedation is cut down. Patient starts to wake up and realizes that she is paralyzed and that causes a hypertensive episode, so we'll keep her Biz between 40 and 60 with propofol and fentanyl, and if blood pressure decreases, then we will use Levophed  to keep the map above 65. Discussed with respiratory therapy. Mean airway pressure is set at 30, amplitude and frequency noted  Continue present treatment  Total critical care time caring for this patient with life threatening, unstable organ failure, including direct patient contact, management of life support systems, review of data including imaging and labs, discussions with other team members and physicians at least 48 Min so far today, excluding procedures. Treatment plan Discussed with nursing staff in detail , all questions answered . Licha Ureña MD   2/7/2018   5:04 PM    Please note that this chart was generated using voice recognition Dragon dictation software. Although every effort was made to ensure the accuracy of this automated transcription, some errors in transcription may have occurred.

## 2018-02-07 NOTE — PLAN OF CARE
Problem: OXYGENATION/RESPIRATORY FUNCTION  Goal: Patient will maintain patent airway  Outcome: Ongoing    Goal: Patient will achieve/maintain normal respiratory rate/effort  Respiratory rate and effort will be within normal limits for the patient   Outcome: Ongoing  Patient on Nimbex on HFVO    Problem: MECHANICAL VENTILATION  Goal: Patient will maintain patent airway  Outcome: Ongoing    Goal: Oral health is maintained or improved  Outcome: Ongoing    Goal: ET tube will be managed safely  Outcome: Ongoing    Goal: Ability to express needs and understand communication  Outcome: Ongoing  Patient on Nimbex on HFVO  Goal: Mobility/activity is maintained at optimum level for patient  Outcome: Ongoing  Patient on Nimbex on HFVO    Problem: SKIN INTEGRITY  Goal: Skin integrity is maintained or improved  Outcome: Ongoing

## 2018-02-07 NOTE — PROGRESS NOTES
02/07/18 0437   Vent Information   FiO2  100 %     Patient pre-oxygenated for turn and linin change. Tolerated well by patient, decreased to 80% post turn and then to 60% shortly after.

## 2018-02-07 NOTE — CONSULTS
Palliative Care Inpatient Consult    NAME:  Alyx Butler  MEDICAL RECORD NUMBER:  [id number]  AGE: 43 y.o. GENDER: female  : 1975  TODAY'S DATE:  2018    Reasons for Consultation:    Symptom and/or pain management  Provision of information regarding PC and/or hospice philosophies  Complex, time-intensive communication and interdisciplinary psychosocial support  Clarification of goals of care and/or assistance with difficult decision-making  Guidance in regards to resources and transition(s)    Members of PC team contributing to this consultation are :  Tino Bone RN BSN    History of Present Illness     The patient is a 43 y.o. Non-/non  female who presents with Shortness of Breath and Respiratory Distress    Referred to Palliative Care by   [] Physician   [x] Nursing  [] Family Request   [] Other:       She was admitted to the Critical Care service for Pneumonia [J18.9]  NSTEMI (non-ST elevated myocardial infarction) ([facility] [address] [I21.4]. Her hospital course has been associated with ARDS (adult respiratory distress syndrome) ([facility]  The patient has a complicated medical history and has been hospitalized since 2018  8:09 PM.    Active Hospital Problems    Diagnosis Date Noted    Fever [R50.9]     Disease due to rhinovirus [B34.8]     ARDS (adult respiratory distress syndrome) (HCC) [J80] 2018    COPD exacerbation (HCC) [J44.1]     NSTEMI (non-ST elevated myocardial infarction) ([facility] [address] [I21.4] 2018    Acute pulmonary edema ([facility] [J81.0] 2018    Hypertensive emergency [I16.1] 2018    Acute respiratory failure with hypoxia and hypercapnia ([facility] [address] [J96.01, J96.02] 2018    Smoker [F17.200] 2018    Morbid obesity ([address] [E66.01] 2018    Elevated troponin [R74.8] 2018    Obesity hypoventilation syndrome (HCC) [E66.2] 2018    SOB (shortness of breath) [R06.02] 2018    Pneumonia of both lower lobes due to infectious organism [J18.9] 01/26/2018       PAST MEDICAL HISTORY      Diagnosis Date    COPD (chronic obstructive pulmonary disease) ([facility] [address]        PAST SURGICAL HISTORY  Past Surgical History:   Procedure Laterality Date    HC CATH POWER PICC TRIPLE  2/2/2018            SOCIAL HISTORY  Social History   Substance Use Topics    Smoking status: Heavy Tobacco Smoker     Packs/day: 0.50    Smokeless tobacco: Never Used    Alcohol use Not on file       ALLERGIES  Allergies   Allergen Reactions    Asa [Aspirin]     Sulfa Antibiotics          MEDICATIONS  Current Medications    Glycopyrrolate  0.1 mg Intravenous BID    nicotine  1 patch Transdermal Daily    lisinopril  20 mg Oral Daily    hydrALAZINE  100 mg Oral 3 times per day    predniSONE  10 mg Oral Daily    heparin (porcine)  5,000 Units Subcutaneous 3 times per day    insulin lispro  0-6 Units Subcutaneous 4 times per day    atropine  0.4 mg Intravenous Once    sodium chloride flush  10 mL Intravenous 2 times per day    amLODIPine  10 mg Oral Daily    famotidine  20 mg Oral BID    sodium chloride flush  10 mL Intravenous 2 times per day    atorvastatin  40 mg Oral Nightly     hydrALAZINE, polyvinyl alcohol, cloNIDine, naloxone, sodium chloride flush, albuterol, glucose, dextrose, glucagon (rDNA), dextrose, fentanNYL **OR** fentanNYL, lidocaine viscous, magnesium hydroxide, ondansetron, potassium chloride **OR** potassium chloride **OR** potassium chloride, sodium chloride flush, acetaminophen  IV Drips/Infusions   sodium chloride 10 mL/hr at 02/07/18 0847    fentaNYL 100 mcg/hr (02/07/18 1157)    cisatracurium (NIMBEX) infusion 3.25 mcg/kg/min (02/07/18 1431)    dextrose      propofol 35 mcg/kg/min (02/07/18 1446)     Home Medications  No current facility-administered medications on file prior to encounter. No current outpatient prescriptions on file prior to encounter.        Data         BP (!) 236/85   Pulse 100   Temp 99 °F (37.2 °C) (Oral)   Resp (!) 0   Ht 5' (1.524 m)   Wt (!) 344 lb 8 oz (156.3 kg)   SpO2 91%   BMI 67.28 kg/m²     Wt Readings from Last 3 Encounters:   02/07/18 (!) 344 lb 8 oz (156.3 kg)        Code Status: Full Code     ADVANCED CARE PLANNING:  Patient has capacity for medical decisions: no  Health Care Power of : no  Living Will: no     Personal, Social, and Family History  Marital Status: single  Living situation:with family:  son  Importance of garret/Gnosticism/spiritual beliefs: X Very ? Somewhat ? Not   Psychological Distress: none seen, patient is intubated/sedated  Does patient understand diagnosis/treatment? no  Does caregiver understand diagnosis/treatment? yes      Assessment        Palliative Performance Scale:  ___60%  Ambulation reduced; Significant disease; Can't do hobbies/housework; intake normal or reduced; occasional assist; LOC full/confusion  ___50%  Mainly sit/lie; Extensive disease; Can't do any work; Considerable assist; intake normal or reduced; LOC full/confusion  ___40%  Mainly in bed; Extensive disease; Mainly assist; intake normal or reduced; LOC full/confusion   ___30%  Bed Bound; Extensive disease; Total care; intake reduced; LOCfull/confusion  ___20%  Bed Bound; Extensive disease; Total care; intake minimal; Drowsy/coma  _X_10%  Bed Bound; Extensive disease; Total care; Mouth care only; Drowsy/coma  ___0       Death      Plan      Palliative Interaction:  Palliative Care was consulted for support. The patient is currently intubated, on an oscillator, and is not able to participate in conversation. The patient is not legally  and has a son, Malorie Kearns who is 23years old. Her father is still alive and she has 3 living siblings - Maryana Whelan, and Carlos. Per previous documentation from other services, the patient's son Malorie Kearns wants to defer his decision making to his Silvio Ashleythelma (patient's sister.)  Today, patient's sister Melody Delgado and Denia's  Boogie Muñoz are here to meet with Dr. Jos Byers.   They

## 2018-02-07 NOTE — FLOWSHEET NOTE
Family was very worried at the beginning of my shift of pts. poor prognosis. Family was very concerned of what the next step would be. They explained they were informed pt is not following commands, xray showing worsening pulmonary edema and ARDS, vent setting had increased along with patient desatting overnight, and also informed pt is not a candidate for a trach due to size. I told family I was unsure what was said and that I would look over the physicians notes along with speaking to the day RT to see what changes were made. After looking over the notes and speaking with RT, RT informed me that patients ABG's were not only improving greatly compared to the night prior but also vent settings were lowered from 90%-60%. RT also stated Nitric would be weaned off by midnight and the plan was to keep decreasing the oscillator in order to get pt back on the regular vent. RN had also stated there were no acute events that happened during the day. I went back and explained to family that because pt being on Nimbex gtt were unable to get a thorough neuro assessment because we are unable to give a holiday sedation since her lungs need to rest. I also explained the reason for her desatting the night prior was due to her being on her R side. Dr Ana Luisa De La Cruz explained that pts. L lung was better than the R so pt sats better when on the L side. I explained although it did take her awhile to get back to her baseline originally, she did come back up slowly and we did need to increase her FiO2. I also explained although pts. Xray showed worsening, ABG's were improving and vent setting were back to their original settings. I informed family that I have never heard of pt not being a candidate for trach due to size. I also stated that pt has COPD and that it is already difficult for her to oxygenate.  I explained we would continue to take little steps as far as weaning off the Nitric and Oscillator and hope for improvements like she has

## 2018-02-07 NOTE — PROGRESS NOTES
(high)    IMAGING  18 cxr: worsening diffuse b/l airspace opacities suggestive of multilobar pneumonia or pulmonary edema     18 echo: hyperdynamic left ventricle, otherwise normal    18 cxr: patchy opacification bialterally   18: CXR diffuse bilateral involvement. ARDS. No major change. DISCUSSION:  Patient with COPD, morbid obesity. Presented with acute rhinoviral infection, pneumonia and respiratory failure. Has required ventilatory support. Clinical picture suggestive of ARDS. Patient is undergoing treatment with the ARDS protocol. Some improvement has been achieved. There is no treatment for the acute rhinoviral infection. Ceftriaxone was given for presumptive associated community-acquired pneumonia, although cultures have not yielded any bacterial growth. Course of rocephin completed. Patient remains critically ill. Discussed with CC, RN. ICU care 30 min.     Physical Examination :     Patient Vitals for the past 8 hrs:   BP Temp Temp src Pulse Resp SpO2 Height Weight   18 0915 - - - 74 (!) 0 94 % - -   18 0900 (!) 112/45 - - 76 (!) 0 93 % - -   18 0845 - - - 75 (!) 0 94 % - -   18 0830 - - - 79 (!) 0 92 % - -   18 0815 - - - 76 (!) 0 92 % - -   18 0810 - - - 84 (!) 7 92 % - -   18 0800 (!) 161/62 99.1 °F (37.3 °C) Oral 73 (!) 0 93 % - -   18 0745 - - - 72 (!) 0 92 % - -   18 0730 - - - 73 (!) 0 94 % - -   18 0715 - - - 73 (!) 0 94 % - -   18 0700 (!) 151/47 - - 79 11 94 % - -   18 0500 - - - 87 19 98 % - -   18 0401 - - - 84 (!) 0 92 % - -   18 0400 (!) 189/46 99.7 °F (37.6 °C) Tympanic 83 (!) 0 94 % 5' (1.524 m) (!) 344 lb 8 oz (156.3 kg)   18 0300 (!) 193/80 - - 78 (!) 0 - - -   18 0206 - - - 78 (!) 0 93 % - -   18 0200 (!) 140/109 - - 77 (!) 0 - - -     Temp (24hrs), Av.1 °F (37.3 °C), Min:98.8 °F (37.1 °C), Max:99.7 °F (37.6 °C)    General Appearance: sedated

## 2018-02-08 ENCOUNTER — APPOINTMENT (OUTPATIENT)
Dept: GENERAL RADIOLOGY | Age: 43
DRG: 005 | End: 2018-02-08
Payer: MEDICARE

## 2018-02-08 LAB
ALLEN TEST: ABNORMAL
ANION GAP SERPL CALCULATED.3IONS-SCNC: 8 MMOL/L (ref 9–17)
BUN BLDV-MCNC: 46 MG/DL (ref 6–20)
BUN/CREAT BLD: ABNORMAL (ref 9–20)
C TRACH DNA GENITAL QL NAA+PROBE: NEGATIVE
CALCIUM SERPL-MCNC: 8.2 MG/DL (ref 8.6–10.4)
CHLORIDE BLD-SCNC: 108 MMOL/L (ref 98–107)
CO2: 27 MMOL/L (ref 20–31)
CREAT SERPL-MCNC: 0.71 MG/DL (ref 0.5–0.9)
FIO2: 60
GFR AFRICAN AMERICAN: >60 ML/MIN
GFR NON-AFRICAN AMERICAN: >60 ML/MIN
GFR SERPL CREATININE-BSD FRML MDRD: ABNORMAL ML/MIN/{1.73_M2}
GFR SERPL CREATININE-BSD FRML MDRD: ABNORMAL ML/MIN/{1.73_M2}
GLUCOSE BLD-MCNC: 101 MG/DL (ref 70–99)
GLUCOSE BLD-MCNC: 87 MG/DL (ref 65–105)
GLUCOSE BLD-MCNC: 99 MG/DL (ref 74–100)
HCT VFR BLD CALC: 27.2 % (ref 36.3–47.1)
HEMOGLOBIN: 7.7 G/DL (ref 11.9–15.1)
MCH RBC QN AUTO: 25 PG (ref 25.2–33.5)
MCHC RBC AUTO-ENTMCNC: 28.3 G/DL (ref 28.4–34.8)
MCV RBC AUTO: 88.3 FL (ref 82.6–102.9)
MODE: ABNORMAL
N. GONORRHOEAE DNA: NEGATIVE
NEGATIVE BASE EXCESS, ART: ABNORMAL (ref 0–2)
NRBC AUTOMATED: 0 PER 100 WBC
O2 DEVICE/FLOW/%: ABNORMAL
PATIENT TEMP: ABNORMAL
PDW BLD-RTO: 15.9 % (ref 11.8–14.4)
PLATELET # BLD: 210 K/UL (ref 138–453)
PMV BLD AUTO: 11.4 FL (ref 8.1–13.5)
POC HCO3: 30.4 MMOL/L (ref 21–28)
POC O2 SATURATION: 95 % (ref 94–98)
POC PCO2 TEMP: ABNORMAL MM HG
POC PCO2: 41.5 MM HG (ref 35–48)
POC PH TEMP: ABNORMAL
POC PH: 7.47 (ref 7.35–7.45)
POC PO2 TEMP: ABNORMAL MM HG
POC PO2: 70.3 MM HG (ref 83–108)
POSITIVE BASE EXCESS, ART: 6 (ref 0–3)
POTASSIUM SERPL-SCNC: 3.9 MMOL/L (ref 3.7–5.3)
RBC # BLD: 3.08 M/UL (ref 3.95–5.11)
SAMPLE SITE: ABNORMAL
SODIUM BLD-SCNC: 143 MMOL/L (ref 135–144)
TCO2 (CALC), ART: 32 MMOL/L (ref 22–29)
WBC # BLD: 11.8 K/UL (ref 3.5–11.3)

## 2018-02-08 PROCEDURE — 6360000002 HC RX W HCPCS: Performed by: STUDENT IN AN ORGANIZED HEALTH CARE EDUCATION/TRAINING PROGRAM

## 2018-02-08 PROCEDURE — 2580000003 HC RX 258: Performed by: INTERNAL MEDICINE

## 2018-02-08 PROCEDURE — 2500000003 HC RX 250 WO HCPCS: Performed by: INTERNAL MEDICINE

## 2018-02-08 PROCEDURE — 71045 X-RAY EXAM CHEST 1 VIEW: CPT

## 2018-02-08 PROCEDURE — 94762 N-INVAS EAR/PLS OXIMTRY CONT: CPT

## 2018-02-08 PROCEDURE — 80048 BASIC METABOLIC PNL TOTAL CA: CPT

## 2018-02-08 PROCEDURE — 82947 ASSAY GLUCOSE BLOOD QUANT: CPT

## 2018-02-08 PROCEDURE — 6370000000 HC RX 637 (ALT 250 FOR IP): Performed by: INTERNAL MEDICINE

## 2018-02-08 PROCEDURE — 2000000000 HC ICU R&B

## 2018-02-08 PROCEDURE — 99291 CRITICAL CARE FIRST HOUR: CPT | Performed by: INTERNAL MEDICINE

## 2018-02-08 PROCEDURE — 82803 BLOOD GASES ANY COMBINATION: CPT

## 2018-02-08 PROCEDURE — 6370000000 HC RX 637 (ALT 250 FOR IP): Performed by: HOSPITALIST

## 2018-02-08 PROCEDURE — 36415 COLL VENOUS BLD VENIPUNCTURE: CPT

## 2018-02-08 PROCEDURE — 85027 COMPLETE CBC AUTOMATED: CPT

## 2018-02-08 PROCEDURE — 99233 SBSQ HOSP IP/OBS HIGH 50: CPT | Performed by: INTERNAL MEDICINE

## 2018-02-08 PROCEDURE — 6370000000 HC RX 637 (ALT 250 FOR IP): Performed by: EMERGENCY MEDICINE

## 2018-02-08 PROCEDURE — 94003 VENT MGMT INPAT SUBQ DAY: CPT

## 2018-02-08 RX ADMIN — PROPOFOL 40 MCG/KG/MIN: 10 INJECTION, EMULSION INTRAVENOUS at 07:01

## 2018-02-08 RX ADMIN — PROPOFOL 40 MCG/KG/MIN: 10 INJECTION, EMULSION INTRAVENOUS at 15:12

## 2018-02-08 RX ADMIN — PROPOFOL 40 MCG/KG/MIN: 10 INJECTION, EMULSION INTRAVENOUS at 04:13

## 2018-02-08 RX ADMIN — PROPOFOL 35 MCG/KG/MIN: 10 INJECTION, EMULSION INTRAVENOUS at 18:13

## 2018-02-08 RX ADMIN — FAMOTIDINE 20 MG: 20 TABLET, FILM COATED ORAL at 19:52

## 2018-02-08 RX ADMIN — HEPARIN SODIUM 5000 UNITS: 5000 INJECTION, SOLUTION INTRAVENOUS; SUBCUTANEOUS at 14:01

## 2018-02-08 RX ADMIN — HEPARIN SODIUM 5000 UNITS: 5000 INJECTION, SOLUTION INTRAVENOUS; SUBCUTANEOUS at 21:41

## 2018-02-08 RX ADMIN — CISATRACURIUM BESYLATE 3.25 MCG/KG/MIN: 10 INJECTION INTRAVENOUS at 03:50

## 2018-02-08 RX ADMIN — PROPOFOL 40 MCG/KG/MIN: 10 INJECTION, EMULSION INTRAVENOUS at 10:19

## 2018-02-08 RX ADMIN — Medication 100 MCG/HR: at 16:30

## 2018-02-08 RX ADMIN — PREDNISONE 10 MG: 10 TABLET ORAL at 08:07

## 2018-02-08 RX ADMIN — FAMOTIDINE 20 MG: 20 TABLET, FILM COATED ORAL at 08:07

## 2018-02-08 RX ADMIN — PROPOFOL 40 MCG/KG/MIN: 10 INJECTION, EMULSION INTRAVENOUS at 12:58

## 2018-02-08 RX ADMIN — CISATRACURIUM BESYLATE 3.25 MCG/KG/MIN: 10 INJECTION INTRAVENOUS at 10:19

## 2018-02-08 RX ADMIN — CISATRACURIUM BESYLATE 3.25 MCG/KG/MIN: 10 INJECTION INTRAVENOUS at 17:03

## 2018-02-08 RX ADMIN — PROPOFOL 40 MCG/KG/MIN: 10 INJECTION, EMULSION INTRAVENOUS at 02:09

## 2018-02-08 RX ADMIN — PROPOFOL 35 MCG/KG/MIN: 10 INJECTION, EMULSION INTRAVENOUS at 21:40

## 2018-02-08 RX ADMIN — GLYCOPYRROLATE 0.1 MG: 0.2 INJECTION INTRAMUSCULAR; INTRAVENOUS at 08:07

## 2018-02-08 RX ADMIN — DESMOPRESSIN ACETATE 40 MG: 0.2 TABLET ORAL at 19:52

## 2018-02-08 RX ADMIN — HEPARIN SODIUM 5000 UNITS: 5000 INJECTION, SOLUTION INTRAVENOUS; SUBCUTANEOUS at 06:01

## 2018-02-08 RX ADMIN — Medication 100 MCG/HR: at 06:43

## 2018-02-08 RX ADMIN — SODIUM CHLORIDE: 9 INJECTION, SOLUTION INTRAVENOUS at 02:20

## 2018-02-08 RX ADMIN — GLYCOPYRROLATE 0.1 MG: 0.2 INJECTION INTRAMUSCULAR; INTRAVENOUS at 19:52

## 2018-02-08 RX ADMIN — POLYVINYL ALCOHOL 1 DROP: 14 SOLUTION/ DROPS OPHTHALMIC at 08:37

## 2018-02-08 RX ADMIN — FLUCONAZOLE 200 MG: 2 INJECTION, SOLUTION INTRAVENOUS at 00:37

## 2018-02-08 ASSESSMENT — PAIN SCALES - GENERAL
PAINLEVEL_OUTOF10: 0
PAINLEVEL_OUTOF10: 0

## 2018-02-08 NOTE — PROGRESS NOTES
Critical Care Team - Daily Progress Note      Date and time: 2/8/2018 4:44 PM  Patient's name:  Santi Reardon  Medical Record Number: [id number]  Patient's account/billing number: [de-identified]  Patient's YOB: 1975  Age: 43 y.o. Date of Admission: 1/26/2018  8:09 PM  Length of stay during current admission: 13    Primary Care Physician: Desiree Erickson  ICU Attending Physician: Dr. Pattricia Kehr MD    Code Status: Full Code    Reason for ICU admission: SOB, Pneumonia, Hypertensive Emergency. SUBJECTIVE:   Patient presented with COPD exacerbation and possible pneumonia requiring BiPAP. Patient was admitted to stepdown when she developed tachypnea and registered distress she was transferred to ICU she was high risk for intubation and required critical care. ICU course has been complicated by acute respiratory distress syndrome. Patient is currently intubated and ventilated requirin high Plateau and peak pressures likely related to poor compliance due to ARDS, in addition to obesity. Patient developed vent asynchrony and worsening oxygenation and was started on Nimbex. Repeat ABG show CO2 retention and acidemia. Patient continues to have significant hypertension especially after propofol was decreased and has been requiring Cardene in addition to Norvasc/Lisinopril/Hydralyzine    OVERNIGHT EVENTS:    Normal heart rate  Afebrile  Candida from vaginal swab   Saturation 96% on High frequency Oscillator. FIO2 60.   T max 99 f  Sodium improving 150--->145      AWAKE & FOLLOWING COMMANDS:  [x] No   [] Yes    CURRENT VENTILATION STATUS:     [x] High-frequency oscillator ventilation [] BIPAP 85% [] Nasal Cannula [] Room Air      IF INTUBATED, ET TUBE MARKING AT LOWER LIP:       cms    SECRETIONS Amount:  [] Small [] Moderate  [] Large  [x] None  Color:     [] White [] Colored  [] Bloody    SEDATION:  RAAS Score:  [x] Propofol gtt  [] Versed gtt  [] Ativan gtt   [] No Sedation    PARALYZED:  [] No [x] Yes    DIARRHEA:                [x] No                [] Yes  (C. Difficile status: [] positive                                                                                                                       [] negative                                                                                                                     [] pending)     VASOPRESSORS:  [] No    [x] Yes    If yes -   [x] Levophed       [] Dopamine     [] Vasopressin       [] Dobutamine  [] Phenylephrine         [] Epinephrine    CENTRAL LINES:     [x] No   [] Yes   (Date of Insertion:   )           If yes -     [] Right IJ     [] Left IJ [] Right Femoral [] Left Femoral                   [] Right Subclavian [] Left Subclavian    picc rue     PASTRANA'S CATHETER:   [] No   [x] Yes  (Date of Insertion: 2018  )     URINE OUTPUT:            [x] Good   [] Low              [] Anuric  Ros unable to perform due to intubation and sedation  And nimbex   OBJECTIVE:     VITAL SIGNS:  BP (!) 127/45   Pulse 73   Temp 99 °F (37.2 °C) (Oral)   Resp (!) 0   Ht 5' (1.524 m)   Wt (!) 340 lb 9.6 oz (154.5 kg)   SpO2 96%   BMI 66.52 kg/m²   Tmax over 24 hours:  Temp (24hrs), Av °F (37.2 °C), Min:98.8 °F (37.1 °C), Max:99.1 °F (37.3 °C)      Patient Vitals for the past 6 hrs:   BP Temp Temp src Pulse Resp SpO2   18 1600 (!) 127/45 99 °F (37.2 °C) Oral 73 (!) 0 96 %   18 1559 - - - 70 10 96 %   18 1500 (!) 131/48 - - 71 (!) 0 95 %   18 1411 - - - 73 (!) 0 97 %   18 1400 (!) 124/54 - - 77 (!) 0 97 %   18 1300 (!) 132/47 - - 75 (!) 0 96 %   18 1200 (!) 137/52 99 °F (37.2 °C) Oral 79 (!) 0 95 %   18 1156 - - - 76 (!) 0 95 %   18 1100 (!) 129/48 - - 69 (!) 0 95 %         Intake/Output Summary (Last 24 hours) at 18 1644  Last data filed at 18 1600   Gross per 24 hour   Intake          2868.51 ml   Output             3045 ml   Net          -176.49 ml     Wt Readings from Last 2 Encounters:   02/08/18 (!) 340 lb 9.6 oz (154.5 kg)     Body mass index is 66.52 kg/m².             PHYSICAL EXAMINATION:    Intubated   Sedated paralysed   Lungs and cvs - ocillation sounds   abd soft nt bs +   Le edema   ue edema       Any additional physical findings:    MEDICATIONS:    Scheduled Meds:   Glycopyrrolate  0.1 mg Intravenous BID    nicotine  1 patch Transdermal Daily    lisinopril  20 mg Oral Daily    hydrALAZINE  100 mg Oral 3 times per day    predniSONE  10 mg Oral Daily    heparin (porcine)  5,000 Units Subcutaneous 3 times per day    atropine  0.4 mg Intravenous Once    sodium chloride flush  10 mL Intravenous 2 times per day    amLODIPine  10 mg Oral Daily    famotidine  20 mg Oral BID    sodium chloride flush  10 mL Intravenous 2 times per day    atorvastatin  40 mg Oral Nightly     Continuous Infusions:   norepinephrine Stopped (02/08/18 1554)    sodium chloride 10 mL/hr at 02/08/18 0220    fentaNYL 100 mcg/hr (02/08/18 1630)    cisatracurium (NIMBEX) infusion 3.25 mcg/kg/min (02/08/18 1019)    dextrose      propofol 40 mcg/kg/min (02/08/18 1512)     PRN Meds:     hydrALAZINE 10 mg Q4H PRN   polyvinyl alcohol 1 drop PRN   cloNIDine 0.1 mg Q6H PRN   naloxone 0.4 mg PRN   sodium chloride flush 10 mL PRN   albuterol 2.5 mg Q6H PRN   glucose 15 g PRN   dextrose 12.5 g PRN   glucagon (rDNA) 1 mg PRN   dextrose 100 mL/hr PRN   fentanNYL 50 mcg Q1H PRN   Or     fentanNYL 100 mcg Q1H PRN   lidocaine viscous 5 mL PRN   magnesium hydroxide 30 mL Daily PRN   ondansetron 4 mg Q6H PRN   potassium chloride 40 mEq PRN   Or     potassium chloride 40 mEq PRN   Or     potassium chloride 10 mEq PRN   sodium chloride flush 10 mL PRN   acetaminophen 650 mg Q4H PRN         VENT SETTINGS (Comprehensive) (if applicable):  Vent Information  Ventilator Started: Yes  Ventilation Day(s): 5  Vent Type: 3100B  Vent Mode: HFOV (Hz=5  AMP=55)  Vt Ordered: 320 mL  Vt Exhaled: 321 mL  Pressure Ordered: on ARDS protocol-worsening  Continue with high frequency oscillator, nitrous oxide, Nimbex gtt  Attempt to wean off nitrous oxide. Duoneb/Dulera. CXR Daily       Hypernatremia improving  Decrease free water bolus 300 ml every 8 hr    Troponemia initially present and Ischemia workup once extubated. HIEN resolved   Smoker on nicotine patch. DVT Prophylaxis. Heparin sc  GI Prophylaxis Pepcid 20 mg bid      Keo Negron MD      Department of Internal Medicine  [facility]         2/8/2018, 4:44 PM   Attending Physician Statement  I have discussed the care of Theresa Smith, including pertinent history and exam findings,  with the resident. I have seen and examined the patient and the key elements of all parts of the encounter have been performed by me. I agree with the assessment, plan and orders as documented by the resident with additions . Poppy Ferro updated   Cont present tt with HFOV   Slowly wean fio2   Has been weaned off levophed and keeping sbp >110 . Will dc antihypertensives as not given today - BP was controlled with adequate sedation and levophed   Total critical care time caring for this patient with life threatening, unstable organ failure, including direct patient contact, management of life support systems, review of data including imaging and labs, discussions with other team members and physicians at least 28   Min so far today, excluding procedures. Treatment plan Discussed with nursing staff in detail , all questions answered . Lino Cheema MD   2/8/2018   7:08 PM    Please note that this chart was generated using voice recognition Dragon dictation software. Although every effort was made to ensure the accuracy of this automated transcription, some errors in transcription may have occurred.

## 2018-02-08 NOTE — PROGRESS NOTES
02/08/18 0015   Cough/Sputum   Sputum Amount Small   Sputum Color White   Tenacity Thick   Sputum How Obtained Endotracheal   Bag suctioned patient after being pre-oxygenated on 100% FiO2. Patient tolerated well, SpO2 stayed above 97%. Placed patient back on HFOV.

## 2018-02-08 NOTE — PROGRESS NOTES
phenomena. ENT:Oropharynx clear, without erythema, exudate, or thrush. No nasal drainage. Neck: Supple, without lymphadenopathy. No JVD. Pulmonary/Chest: coarse rales and rhonchi bilaterally  Cardiovascular:Regular rate and rhythm without murmurs, rubs, or gallops. S1 and S2 normal.  Abdomen: Soft, non tender. Bowel sounds normal. No cramps. No organomegaly, FMS inplace  All four Extremities:No cyanosis, clubbing, or effusions. 1+ edema in lower and upper extremities  Neurologic:No gross sensory or motor deficits. Sedated  Skin: No rash or lesions. IV site inspected: no inflammation. Medical Decision Making:   I have independently reviewed/ordered the following labs:  EXAMINATION:   SINGLE VIEW OF THE CHEST       2/7/2018 7:26 am       COMPARISON:   Chest x-ray from 02/06/2018       HISTORY:   ORDERING SYSTEM PROVIDED HISTORY: eval, ARDS   TECHNOLOGIST PROVIDED HISTORY:   Reason for exam:->eval, ARDS       FINDINGS:   Endotracheal tube extends to the mid thoracic trachea approximately 4.3 above   the lore.  Enteric tube is not well visualized throughout its course but   can be followed at least to the level of the mid thoracic esophagus.       There is stable enlargement of cardiac silhouette.  There is similar   prominence and indistinctness of the pulmonary vasculature and diffuse   pulmonary haziness no sizable pleural effusions.  No pneumothorax. Visualized osseous structures appear intact and grossly unremarkable, given   the non dedicated imaging.           Impression   1. Given limitations, similar, expected positions of medical support devices. 2. Similar findings compatible with pulmonary edema/ARDS, atelectasis and/or   nonspecific pneumonitis.  Continued imaging follow-up is recommended.        CBC with Differential:   Recent Labs      02/07/18   0412  02/08/18   0509   WBC  15.2*  11.8*   HGB  8.7*  7.7*   HCT  30.0*  27.2*   PLT  245  210     BMP:   Recent Labs      02/06/18   0045

## 2018-02-08 NOTE — PLAN OF CARE
Problem: Falls - Risk of  Goal: Absence of falls  Outcome: Ongoing      Problem: Risk for Impaired Skin Integrity  Goal: Tissue integrity - skin and mucous membranes  Structural intactness and normal physiological function of skin and  mucous membranes.    Outcome: Ongoing      Problem: ABCDS Injury Assessment  Goal: Absence of physical injury  Outcome: Ongoing

## 2018-02-09 LAB
ALLEN TEST: ABNORMAL
ANION GAP SERPL CALCULATED.3IONS-SCNC: 11 MMOL/L (ref 9–17)
BUN BLDV-MCNC: 39 MG/DL (ref 6–20)
BUN/CREAT BLD: ABNORMAL (ref 9–20)
CALCIUM SERPL-MCNC: 8.4 MG/DL (ref 8.6–10.4)
CHLORIDE BLD-SCNC: 109 MMOL/L (ref 98–107)
CO2: 28 MMOL/L (ref 20–31)
CREAT SERPL-MCNC: 0.68 MG/DL (ref 0.5–0.9)
CULTURE: NORMAL
FIO2: 60
GFR AFRICAN AMERICAN: >60 ML/MIN
GFR NON-AFRICAN AMERICAN: >60 ML/MIN
GFR SERPL CREATININE-BSD FRML MDRD: ABNORMAL ML/MIN/{1.73_M2}
GFR SERPL CREATININE-BSD FRML MDRD: ABNORMAL ML/MIN/{1.73_M2}
GLUCOSE BLD-MCNC: 108 MG/DL (ref 70–99)
GLUCOSE BLD-MCNC: 120 MG/DL (ref 74–100)
HCT VFR BLD CALC: 28.8 % (ref 36.3–47.1)
HEMOGLOBIN: 8.1 G/DL (ref 11.9–15.1)
Lab: NORMAL
Lab: NORMAL
MCH RBC QN AUTO: 25.5 PG (ref 25.2–33.5)
MCHC RBC AUTO-ENTMCNC: 28.1 G/DL (ref 28.4–34.8)
MCV RBC AUTO: 90.6 FL (ref 82.6–102.9)
MODE: ABNORMAL
NEGATIVE BASE EXCESS, ART: ABNORMAL (ref 0–2)
NRBC AUTOMATED: 0.2 PER 100 WBC
O2 DEVICE/FLOW/%: ABNORMAL
PATIENT TEMP: ABNORMAL
PDW BLD-RTO: 15.9 % (ref 11.8–14.4)
PLATELET # BLD: 248 K/UL (ref 138–453)
PMV BLD AUTO: 11.4 FL (ref 8.1–13.5)
POC HCO3: 34.6 MMOL/L (ref 21–28)
POC O2 SATURATION: 93 % (ref 94–98)
POC PCO2 TEMP: ABNORMAL MM HG
POC PCO2: 55 MM HG (ref 35–48)
POC PH TEMP: ABNORMAL
POC PH: 7.41 (ref 7.35–7.45)
POC PO2 TEMP: ABNORMAL MM HG
POC PO2: 68.9 MM HG (ref 83–108)
POSITIVE BASE EXCESS, ART: 9 (ref 0–3)
POTASSIUM SERPL-SCNC: 4.9 MMOL/L (ref 3.7–5.3)
RBC # BLD: 3.18 M/UL (ref 3.95–5.11)
SAMPLE SITE: ABNORMAL
SODIUM BLD-SCNC: 148 MMOL/L (ref 135–144)
SPECIMEN DESCRIPTION: NORMAL
SPECIMEN DESCRIPTION: NORMAL
STATUS: NORMAL
STATUS: NORMAL
TCO2 (CALC), ART: 36 MMOL/L (ref 22–29)
WBC # BLD: 12.1 K/UL (ref 3.5–11.3)

## 2018-02-09 PROCEDURE — 6360000002 HC RX W HCPCS: Performed by: STUDENT IN AN ORGANIZED HEALTH CARE EDUCATION/TRAINING PROGRAM

## 2018-02-09 PROCEDURE — 2580000003 HC RX 258: Performed by: STUDENT IN AN ORGANIZED HEALTH CARE EDUCATION/TRAINING PROGRAM

## 2018-02-09 PROCEDURE — 6370000000 HC RX 637 (ALT 250 FOR IP): Performed by: HOSPITALIST

## 2018-02-09 PROCEDURE — 2580000003 HC RX 258: Performed by: INTERNAL MEDICINE

## 2018-02-09 PROCEDURE — 82947 ASSAY GLUCOSE BLOOD QUANT: CPT

## 2018-02-09 PROCEDURE — 85027 COMPLETE CBC AUTOMATED: CPT

## 2018-02-09 PROCEDURE — 2500000003 HC RX 250 WO HCPCS: Performed by: INTERNAL MEDICINE

## 2018-02-09 PROCEDURE — 2000000000 HC ICU R&B

## 2018-02-09 PROCEDURE — 6370000000 HC RX 637 (ALT 250 FOR IP): Performed by: STUDENT IN AN ORGANIZED HEALTH CARE EDUCATION/TRAINING PROGRAM

## 2018-02-09 PROCEDURE — 99291 CRITICAL CARE FIRST HOUR: CPT | Performed by: INTERNAL MEDICINE

## 2018-02-09 PROCEDURE — 82803 BLOOD GASES ANY COMBINATION: CPT

## 2018-02-09 PROCEDURE — 80048 BASIC METABOLIC PNL TOTAL CA: CPT

## 2018-02-09 PROCEDURE — 97110 THERAPEUTIC EXERCISES: CPT

## 2018-02-09 PROCEDURE — 94003 VENT MGMT INPAT SUBQ DAY: CPT

## 2018-02-09 PROCEDURE — 6360000002 HC RX W HCPCS

## 2018-02-09 PROCEDURE — 94762 N-INVAS EAR/PLS OXIMTRY CONT: CPT

## 2018-02-09 PROCEDURE — 99233 SBSQ HOSP IP/OBS HIGH 50: CPT | Performed by: INTERNAL MEDICINE

## 2018-02-09 PROCEDURE — 6370000000 HC RX 637 (ALT 250 FOR IP): Performed by: INTERNAL MEDICINE

## 2018-02-09 RX ORDER — HYDRALAZINE HYDROCHLORIDE 20 MG/ML
INJECTION INTRAMUSCULAR; INTRAVENOUS
Status: COMPLETED
Start: 2018-02-09 | End: 2018-02-09

## 2018-02-09 RX ADMIN — GLYCOPYRROLATE 0.1 MG: 0.2 INJECTION INTRAMUSCULAR; INTRAVENOUS at 19:22

## 2018-02-09 RX ADMIN — FAMOTIDINE 20 MG: 20 TABLET, FILM COATED ORAL at 08:26

## 2018-02-09 RX ADMIN — PROPOFOL 30 MCG/KG/MIN: 10 INJECTION, EMULSION INTRAVENOUS at 18:23

## 2018-02-09 RX ADMIN — Medication 100 MCG/HR: at 02:25

## 2018-02-09 RX ADMIN — PROPOFOL 45 MCG/KG/MIN: 10 INJECTION, EMULSION INTRAVENOUS at 02:53

## 2018-02-09 RX ADMIN — CISATRACURIUM BESYLATE 2 MCG/KG/MIN: 10 INJECTION INTRAVENOUS at 23:03

## 2018-02-09 RX ADMIN — Medication 100 MCG/HR: at 12:32

## 2018-02-09 RX ADMIN — FAMOTIDINE 20 MG: 20 TABLET, FILM COATED ORAL at 19:22

## 2018-02-09 RX ADMIN — DESMOPRESSIN ACETATE 40 MG: 0.2 TABLET ORAL at 19:22

## 2018-02-09 RX ADMIN — PROPOFOL 35 MCG/KG/MIN: 10 INJECTION, EMULSION INTRAVENOUS at 08:08

## 2018-02-09 RX ADMIN — PROPOFOL 30 MCG/KG/MIN: 10 INJECTION, EMULSION INTRAVENOUS at 22:09

## 2018-02-09 RX ADMIN — PROPOFOL 35 MCG/KG/MIN: 10 INJECTION, EMULSION INTRAVENOUS at 00:46

## 2018-02-09 RX ADMIN — Medication 10 ML: at 08:29

## 2018-02-09 RX ADMIN — HYDRALAZINE HYDROCHLORIDE 10 MG: 20 INJECTION INTRAMUSCULAR; INTRAVENOUS at 01:34

## 2018-02-09 RX ADMIN — CISATRACURIUM BESYLATE 3.25 MCG/KG/MIN: 10 INJECTION INTRAVENOUS at 08:08

## 2018-02-09 RX ADMIN — PREDNISONE 10 MG: 10 TABLET ORAL at 08:28

## 2018-02-09 RX ADMIN — HEPARIN SODIUM 5000 UNITS: 5000 INJECTION, SOLUTION INTRAVENOUS; SUBCUTANEOUS at 21:39

## 2018-02-09 RX ADMIN — PROPOFOL 35 MCG/KG/MIN: 10 INJECTION, EMULSION INTRAVENOUS at 11:53

## 2018-02-09 RX ADMIN — Medication 10 ML: at 08:28

## 2018-02-09 RX ADMIN — PROPOFOL 30 MCG/KG/MIN: 10 INJECTION, EMULSION INTRAVENOUS at 14:41

## 2018-02-09 RX ADMIN — GLYCOPYRROLATE 0.1 MG: 0.2 INJECTION INTRAMUSCULAR; INTRAVENOUS at 08:27

## 2018-02-09 RX ADMIN — CISATRACURIUM BESYLATE 2 MCG/KG/MIN: 10 INJECTION INTRAVENOUS at 14:42

## 2018-02-09 RX ADMIN — Medication 100 MCG/HR: at 22:15

## 2018-02-09 RX ADMIN — HEPARIN SODIUM 5000 UNITS: 5000 INJECTION, SOLUTION INTRAVENOUS; SUBCUTANEOUS at 16:31

## 2018-02-09 RX ADMIN — PROPOFOL 45 MCG/KG/MIN: 10 INJECTION, EMULSION INTRAVENOUS at 04:31

## 2018-02-09 RX ADMIN — HEPARIN SODIUM 5000 UNITS: 5000 INJECTION, SOLUTION INTRAVENOUS; SUBCUTANEOUS at 06:35

## 2018-02-09 RX ADMIN — CLONIDINE HYDROCHLORIDE 0.1 MG: 0.1 TABLET ORAL at 02:53

## 2018-02-09 RX ADMIN — POLYVINYL ALCOHOL 1 DROP: 14 SOLUTION/ DROPS OPHTHALMIC at 11:55

## 2018-02-09 RX ADMIN — CISATRACURIUM BESYLATE 3.25 MCG/KG/MIN: 10 INJECTION INTRAVENOUS at 01:27

## 2018-02-09 NOTE — PROGRESS NOTES
-   02/09/18 0930 (!) 133/52 - - 84 (!) 0 -   02/09/18 0915 (!) 153/55 - - 84 (!) 0 -   02/09/18 0900 (!) 148/57 - - 89 (!) 3 -   02/09/18 0845 (!) 156/53 - - 89 (!) 3 -   02/09/18 0830 (!) 174/55 - - 83 (!) 7 -   02/09/18 0815 (!) 168/58 - - 83 20 -   02/09/18 0800 (!) 160/55 - - 77 - 94 %   02/09/18 0745 - - - 79 - -   02/09/18 0740 - - - 79 - 92 %         Intake/Output Summary (Last 24 hours) at 02/09/18 1330  Last data filed at 02/09/18 1200   Gross per 24 hour   Intake             2510 ml   Output             1875 ml   Net              635 ml     Wt Readings from Last 2 Encounters:   02/09/18 (!) 342 lb (155.1 kg)     Body mass index is 66.79 kg/m².             PHYSICAL EXAMINATION:    Intubated   paralyzed sedated   abd soft   Lung cvs oscillatory sounds     Any additional physical findings:    MEDICATIONS:    Scheduled Meds:   Glycopyrrolate  0.1 mg Intravenous BID    nicotine  1 patch Transdermal Daily    heparin (porcine)  5,000 Units Subcutaneous 3 times per day    atropine  0.4 mg Intravenous Once    sodium chloride flush  10 mL Intravenous 2 times per day    famotidine  20 mg Oral BID    sodium chloride flush  10 mL Intravenous 2 times per day    atorvastatin  40 mg Oral Nightly     Continuous Infusions:   norepinephrine Stopped (02/08/18 1554)    sodium chloride 10 mL/hr at 02/08/18 0220    fentaNYL 100 mcg/hr (02/09/18 1232)    cisatracurium (NIMBEX) infusion 3.25 mcg/kg/min (02/09/18 0808)    dextrose      propofol 35 mcg/kg/min (02/09/18 1153)     PRN Meds:     hydrALAZINE 10 mg Q4H PRN   polyvinyl alcohol 1 drop PRN   cloNIDine 0.1 mg Q6H PRN   naloxone 0.4 mg PRN   sodium chloride flush 10 mL PRN   albuterol 2.5 mg Q6H PRN   glucose 15 g PRN   dextrose 12.5 g PRN   glucagon (rDNA) 1 mg PRN   dextrose 100 mL/hr PRN   fentanNYL 50 mcg Q1H PRN   Or     fentanNYL 100 mcg Q1H PRN   lidocaine viscous 5 mL PRN   magnesium hydroxide 30 mL Daily PRN   ondansetron 4 mg Q6H PRN   potassium 46*  39*   CREATININE  0.83  0.71  0.68   GLUCOSE  122*  101*  108*   CALCIUM  8.1*  8.2*  8.4*      Magnesium:   Lab Results   Component Value Date    MG 2.3 02/06/2018     Phosphorus:   Lab Results   Component Value Date    PHOS 3.1 02/06/2018     Ionized Calcium:   Lab Results   Component Value Date    CAION 1.15 02/06/2018        Radiology/Imaging:     Chest Xray (2/9/2018):    B/l pulmonary infiltrates   1/27/2018 echocardiogram  CONCLUSIONS    Summary  Technically difficult study. All segments not well seen. No comment can be  made regarding specific wall motion. LV chamber dimension is normal. Systolic function appears to be hyperdynamic  with an estimated EF of 65%. Right ventricular dilatation with normal systolic function. No significant valvular regurgitation or stenosis seen. ASSESSMENT:     Principal Problem:    ARDS (adult respiratory distress syndrome) (HCC)  Active Problems:    Pneumonia of both lower lobes due to infectious organism    NSTEMI (non-ST elevated myocardial infarction) ([facility] [address]    Acute pulmonary edema (HCC)    Hypertensive emergency    Acute respiratory failure with hypoxia and hypercapnia (HCC)    Smoker    Morbid obesity (HCC)    Elevated troponin    Obesity hypoventilation syndrome (HCC)    SOB (shortness of breath)    COPD exacerbation (HCC)    Fever    Disease due to rhinovirus     ARDS   PLAN:     WEAN PER PROTOCOL:  [x] No   [] Yes  [] N/A    DISCONTINUE ANY LABS:   [x] No   [] Yes    ICU PROPHYLAXIS:  Stress ulcer:  [] PPI Agent  [x] [id number] [] Sucralfate  [] Other:  VTE:   [] Enoxaparin  [] Unfract.  Heparin Subcut  [] EPC Cuffs    NUTRITION:  [] NPO [x] Tube Feeding (Specify: ) [] TPN  [] PO (Diet: Diet Tube Feed Continuous/Cyclic w/ Diet)    HOME MEDICATIONS RECONCILED: [] No  [x] Yes    INSULIN DRIP:   [x] No   [] Yes    CONSULTATION NEEDED:  [] No   [x] Yes    FAMILY UPDATED:    [] No   [x] Yes    TRANSFER OUT OF ICU:   [x] No   [] Yes    ADDITIONAL PLAN:     Acute

## 2018-02-09 NOTE — PROGRESS NOTES
[facility] - Daily Progress Note  Today's Date and Time: 2/9/2018, 9:14 AM    Impression :   1. COPD  2. Pulmonary edema   3. Hypertensive emergency  4. Community acquired pneumonia  5. Acute respiratory failure  6. ARDS  7. NSTEMI  8. Rhinovirus infection  9. Respiratory failure- Ventilator    Recommendations:   · Supportive care  · Continue to monitor off antibiotics  · Will discuss with attending    Interval History:   INITIAL HISTORY:  Daniella Cedeno is a 43y.o.-year-old  female who was initially admitted on 1/26/2018. Patient seen at the request of Dr. Laura Benjamin.     Patient w/ hx of COPD on home O2 2.5L initially presented to ED for shortness of breath w/ chest pain. For past week patient has had increased cough and production of yellowish sputum w/ progressively increasing SOB along w/ feelings of fever. In ED she was found to have O2 saturation in 30s and 40s, hypertension of 187/81 and a cxr showing potential multifocal pneumonia and pulmonary edema w/ leukocytosis and increased troponins. She was given aspirin and Plavix in ED and admitted.       On admission she was placed on ceftriaxone and azithromycin for potential pneumonia and given solumedrol q8, lasix BID, and cardene for hypertension and pulmonary edema. We were consulted because the influenza PCR results are pending and patient is unable to take tamiflu PO. Inpatient team is requesting input regarding starting peramivir.         CURRENT EVALUATION: 2/9/2018   No fevers overnight  No fevers overnight  On oscillator  Questionable Arterial line BP vs Cuff BP  HR drops when turned in bed  Off lephophed  BUN improving, Cr Stable  Mild leukocytosis, no significant change from yesterday  Hb Stable  Chest X-ray from yesterday:      Pulmonary Congestion and possible cardiomegaly.  Not much improvement from the day before.       CULTURES  1/26/18 blood cx: no growth  1/26/18 rine cx: no growth  1/29/18 sputum cx: no growth  2/4/18 urine cx: candida  CFU  2/4/18 blood cx: no growth 24hrs    LABS  WBC 15.2-11.8-12.1  Procalcitonin:2/2/18 0.85 (high)    IMAGING  1/26/18 cxr: worsening diffuse b/l airspace opacities suggestive of multilobar pneumonia or pulmonary edema     1/26/18 echo: hyperdynamic left ventricle, otherwise normal    1/30/18 cxr: patchy opacification bialterally   2-7-18: CXR diffuse bilateral involvement. ARDS. No major change. 2/8/18:   Limited evaluation secondary to portable technique; however, cardiomegaly and   pulmonary edema do not appear significantly changed.  Endotracheal tube   positioning similar. DISCUSSION:  Patient with COPD, morbid obesity. Presented with acute rhinoviral infection, pneumonia and respiratory failure. Has required ventilatory support. Clinical picture suggestive of ARDS. Patient is undergoing treatment with the ARDS protocol. Some improvement has been achieved. There is no treatment for the acute rhinoviral infection. Ceftriaxone was given for presumptive associated community-acquired pneumonia, although cultures have not yielded any bacterial growth. Course of rocephin completed. Patient remains critically ill. Discussed with CC, RN. ICU care 30 min.     Physical Examination :     Patient Vitals for the past 8 hrs:   BP Temp Temp src Pulse Resp SpO2 Weight   02/09/18 0800 (!) 160/55 - - 77 - - -   02/09/18 0745 - - - 79 - - -   02/09/18 0740 - - - 79 - 92 % -   02/09/18 0730 (!) 232/70 - - 79 (!) 0 - -   02/09/18 0715 (!) 179/51 - - 76 (!) 0 - -   02/09/18 0700 (!) 187/63 - - 79 (!) 0 93 % -   02/09/18 0645 (!) 177/55 - - 75 (!) 0 92 % -   02/09/18 0630 (!) 159/52 - - 75 (!) 0 92 % -   02/09/18 0615 (!) 162/52 - - 74 (!) 0 91 % -   02/09/18 0612 - - - - - (!) 88 % -   02/09/18 0600 (!) 162/54 - - 70 (!) 0 91 % -   02/09/18 0545 (!) 152/57 - - 78 (!) 0 93 % -   02/09/18 0539 - - - 73 (!) 0 97 % -   02/09/18 0530 (!) 137/47 - - 77 (!) 2 99 % -   18 0515 - - - 77 (!) 0 100 % -   18 0500 (!) 176/56 - - 76 13 100 % -   18 0445 - - - 77 16 97 % (!) 342 lb (155.1 kg)   18 0430 - - - 81 (!) 0 93 % -   18 0415 - - - 84 (!) 0 93 % -   18 0400 (!) 124/51 99 °F (37.2 °C) Oral 81 (!) 0 92 % -   18 0345 - - - 82 (!) 0 91 % -   18 0330 - - - 79 (!) 0 91 % -   18 0323 - - - 82 (!) 0 94 % -   18 0315 - - - 80 (!) 0 95 % -   18 0300 (!) 176/60 - - 73 (!) 0 98 % -   18 0253 (!) 175/53 - - - - - -   18 0245 - - - 79 (!) 0 98 % -   18 0230 - - - 73 (!) 5 98 % -   18 0215 - - - 78 (!) 0 100 % -   18 0200 (!) 137/47 - - 78 (!) 0 100 % -   18 0151 - - - 81 (!) 0 99 % -   18 0145 - - - 80 11 97 % -   18 0134 (!) 199/67 - - - - - -   18 0130 - - - 73 26 91 % -   18 0115 - - - 76 17 96 % -     Temp (24hrs), Av.1 °F (37.3 °C), Min:99 °F (37.2 °C), Max:99.3 °F (37.4 °C)    General Appearance: sedated on the ventilator  Eyes: Sclera anicteric; conjunctivae pink. No hemorhages, no embolic phenomena. ENT:Oropharynx clear, without erythema, exudate, or thrush. No nasal drainage. Neck: Supple, without lymphadenopathy. No JVD. Pulmonary/Chest: coarse rales and rhonchi bilaterally  Cardiovascular:Regular rate and rhythm without murmurs, rubs, or gallops. S1 and S2 normal.  Abdomen: Soft, non tender. Bowel sounds normal. No cramps. No organomegaly, FMS inplace  All four Extremities:No cyanosis, clubbing, or effusions. 1+ edema in lower and upper extremities  Neurologic:No gross sensory or motor deficits. Sedated  Skin: No rash or lesions. IV site inspected: no inflammation.     Medical Decision Making:   I have independently reviewed/ordered the following labs:  EXAMINATION:   SINGLE VIEW OF THE CHEST       2018 7:26 am       COMPARISON:   Chest x-ray from 2018       HISTORY:   ORDERING SYSTEM PROVIDED HISTORY: natanael ARDS   TECHNOLOGIST PROVIDED HISTORY:   Reason for exam:->eval, ARDS       FINDINGS:   Endotracheal tube extends to the mid thoracic trachea approximately 4.3 above   the lore.  Enteric tube is not well visualized throughout its course but   can be followed at least to the level of the mid thoracic esophagus.       There is stable enlargement of cardiac silhouette.  There is similar   prominence and indistinctness of the pulmonary vasculature and diffuse   pulmonary haziness no sizable pleural effusions.  No pneumothorax. Visualized osseous structures appear intact and grossly unremarkable, given   the non dedicated imaging.           Impression   1. Given limitations, similar, expected positions of medical support devices. 2. Similar findings compatible with pulmonary edema/ARDS, atelectasis and/or   nonspecific pneumonitis.  Continued imaging follow-up is recommended. CBC with Differential:   Recent Labs      02/08/18   0509  02/09/18   0624   WBC  11.8*  12.1*   HGB  7.7*  8.1*   HCT  27.2*  28.8*   PLT  210  248     BMP:   Recent Labs      02/06/18   1521   02/08/18   0509  02/09/18   0624   NA  143   < >  143  148*   K  4.0   < >  3.9  4.9   CL  106   < >  108*  109*   CO2  29   < >  27  28   BUN  47*   < >  46*  39*   CREATININE  0.68   < >  0.71  0.68   MG  2.3   --    --    --     < > = values in this interval not displayed. Hepatic Function Panel:     Medications:      Glycopyrrolate  0.1 mg Intravenous BID    nicotine  1 patch Transdermal Daily    heparin (porcine)  5,000 Units Subcutaneous 3 times per day    atropine  0.4 mg Intravenous Once    sodium chloride flush  10 mL Intravenous 2 times per day    famotidine  20 mg Oral BID    sodium chloride flush  10 mL Intravenous 2 times per day    atorvastatin  40 mg Oral Nightly       Thank you for allowing us to participate in the care of this patient. Please call with questions.     Minerva Daniel MD  Pager: [phone]  - Office: [phone]    ATTESTATION:    I have discussed the case, including pertinent history and exam findings with the residents. I have seen and examined the patient and the key elements of the encounter have been performed by me. I have reviewed the laboratory data, other diagnostic studies and discussed them with the residents. I have updated the medical record where necessary. I agree with the assessment, plan and orders as documented by the resident.     Jason Owens MD.

## 2018-02-09 NOTE — PROGRESS NOTES
02/09/18 0612   Vent Information   FiO2  70 %   Additional Respiratory  Assessments   SpO2 (!) 88 %     FiO2 increased from 0.55 to 0.70 due to desaturations 84-88%.  Will cont to monitor SpO2

## 2018-02-09 NOTE — PROGRESS NOTES
[facility] [address]  Occupational Therapy Not Seen Note    Patient not available for Occupational Therapy due to:    [] Testing:    [] Hemodialysis    [] Blood Transfusion in Progress    []Refusal by Patient:    [] Surgery/Procedure:    [] Strict Bedrest    [] Sedation    [] Spine Precautions     [] Pt being transferred to palliative care at this time. Spoke with pt/family and OT services to be defered. [] Pt independent with functional mobility and functional tasks.  Pt with no OT acute care needs at this time, will defer OT eval.    [x] Other: Cx per RN, pt chemically paralyzed, remains intubated/ sedated    Next Scheduled Treatment: Check ICU- will check once out of ICU and able to actively participate    Signature: HANDY Flor/MARION

## 2018-02-09 NOTE — PLAN OF CARE
Problem: Anxiety/Stress:  Goal: Level of anxiety will decrease  Level of anxiety will decrease   Outcome: Ongoing  RAMONE    Problem: Gas Exchange - Impaired:  Goal: Levels of oxygenation will improve  Levels of oxygenation will improve   Outcome: Not Met This Shift  Continues on ossilator    Problem: Pain:  Goal: Pain level will decrease  Pain level will decrease    Outcome: Ongoing  ramone    Problem: Falls - Risk of  Goal: Absence of falls  Outcome: Met This Shift      Problem: Risk for Impaired Skin Integrity  Goal: Tissue integrity - skin and mucous membranes  Structural intactness and normal physiological function of skin and  mucous membranes.    Outcome: Ongoing  Vigorous oral care done    Problem: Pain:  Goal: Control of acute pain  Control of acute pain   Outcome: Met This Shift    Goal: Control of chronic pain  Control of chronic pain   Outcome: Met This Shift    Goal: Pain level will decrease  Pain level will decrease    Outcome: Ongoing  ramone    Problem: Nutrition  Goal: Optimal nutrition therapy  Outcome: Met This Shift      Problem: ABCDS Injury Assessment  Goal: Absence of physical injury  Outcome: Met This Shift      Problem: Neurological  Goal: Maximum potential motor/sensory/cognitive function  Outcome: Not Met This Shift      Problem: OXYGENATION/RESPIRATORY FUNCTION  Goal: Patient will maintain patent airway  Outcome: Met This Shift      Problem: MECHANICAL VENTILATION  Goal: Patient will maintain patent airway  Outcome: Met This Shift    Goal: Oral health is maintained or improved  Outcome: Ongoing    Goal: ET tube will be managed safely  Outcome: Met This Shift      Problem: SKIN INTEGRITY  Goal: Skin integrity is maintained or improved  Outcome: Met This Shift      Problem: Infection - Central Venous Catheter-Associated Bloodstream Infection:  Goal: Will show no infection signs and symptoms  Will show no infection signs and symptoms    Outcome: Met This Shift      Problem: Skin Integrity:  Goal:

## 2018-02-09 NOTE — PROGRESS NOTES
02/09/18 0151   Cough/Sputum   Cough None  (pt paralyzed)   Sputum Amount Moderate   Sputum Color White;Creamy   Tenacity Thick   Sputum How Obtained Endotracheal;Suctioned     Pt ambu-bag instilled suctioned, pt tolerated well. Pre-oxygenated 100%, Van RRT assisting, Drake FARLEY and [employer] informed. Pt returned to HFOV and FiO2 weaned back down to 0.60 as tolerated by pt. SpO2 remained % throughout suctioning.

## 2018-02-10 ENCOUNTER — APPOINTMENT (OUTPATIENT)
Dept: GENERAL RADIOLOGY | Age: 43
DRG: 005 | End: 2018-02-10
Payer: MEDICARE

## 2018-02-10 LAB
ALLEN TEST: POSITIVE
ANION GAP SERPL CALCULATED.3IONS-SCNC: 8 MMOL/L (ref 9–17)
ANION GAP SERPL CALCULATED.3IONS-SCNC: 8 MMOL/L (ref 9–17)
BLOOD BANK SPECIMEN: NORMAL
BUN BLDV-MCNC: 33 MG/DL (ref 6–20)
BUN BLDV-MCNC: 33 MG/DL (ref 6–20)
BUN/CREAT BLD: ABNORMAL (ref 9–20)
BUN/CREAT BLD: ABNORMAL (ref 9–20)
CALCIUM SERPL-MCNC: 8.4 MG/DL (ref 8.6–10.4)
CALCIUM SERPL-MCNC: 8.6 MG/DL (ref 8.6–10.4)
CHLORIDE BLD-SCNC: 111 MMOL/L (ref 98–107)
CHLORIDE BLD-SCNC: 113 MMOL/L (ref 98–107)
CO2: 28 MMOL/L (ref 20–31)
CO2: 29 MMOL/L (ref 20–31)
CREAT SERPL-MCNC: 0.64 MG/DL (ref 0.5–0.9)
CREAT SERPL-MCNC: 0.66 MG/DL (ref 0.5–0.9)
FIO2: 70
GFR AFRICAN AMERICAN: >60 ML/MIN
GFR AFRICAN AMERICAN: >60 ML/MIN
GFR NON-AFRICAN AMERICAN: >60 ML/MIN
GFR NON-AFRICAN AMERICAN: >60 ML/MIN
GFR SERPL CREATININE-BSD FRML MDRD: ABNORMAL ML/MIN/{1.73_M2}
GLUCOSE BLD-MCNC: 101 MG/DL (ref 70–99)
GLUCOSE BLD-MCNC: 92 MG/DL (ref 70–99)
HCT VFR BLD CALC: 26.6 % (ref 36.3–47.1)
HCT VFR BLD CALC: 26.9 % (ref 36.3–47.1)
HEMOGLOBIN: 7.4 G/DL (ref 11.9–15.1)
HEMOGLOBIN: 7.6 G/DL (ref 11.9–15.1)
MAGNESIUM: 2.2 MG/DL (ref 1.6–2.6)
MCH RBC QN AUTO: 25.1 PG (ref 25.2–33.5)
MCH RBC QN AUTO: 25.3 PG (ref 25.2–33.5)
MCHC RBC AUTO-ENTMCNC: 27.8 G/DL (ref 28.4–34.8)
MCHC RBC AUTO-ENTMCNC: 28.3 G/DL (ref 28.4–34.8)
MCV RBC AUTO: 88.8 FL (ref 82.6–102.9)
MCV RBC AUTO: 91.1 FL (ref 82.6–102.9)
MODE: ABNORMAL
NEGATIVE BASE EXCESS, ART: ABNORMAL (ref 0–2)
NRBC AUTOMATED: 0 PER 100 WBC
NRBC AUTOMATED: 0.2 PER 100 WBC
O2 DEVICE/FLOW/%: ABNORMAL
PATIENT TEMP: ABNORMAL
PDW BLD-RTO: 15.9 % (ref 11.8–14.4)
PDW BLD-RTO: 16.3 % (ref 11.8–14.4)
PHOSPHORUS: 4.7 MG/DL (ref 2.6–4.5)
PLATELET # BLD: 204 K/UL (ref 138–453)
PLATELET # BLD: 213 K/UL (ref 138–453)
PMV BLD AUTO: 11 FL (ref 8.1–13.5)
PMV BLD AUTO: 11.2 FL (ref 8.1–13.5)
POC HCO3: 30.4 MMOL/L (ref 21–28)
POC O2 SATURATION: 84 % (ref 94–98)
POC PCO2 TEMP: ABNORMAL MM HG
POC PCO2: 52.8 MM HG (ref 35–48)
POC PH TEMP: ABNORMAL
POC PH: 7.37 (ref 7.35–7.45)
POC PO2 TEMP: ABNORMAL MM HG
POC PO2: 51.3 MM HG (ref 83–108)
POSITIVE BASE EXCESS, ART: 4 (ref 0–3)
POTASSIUM SERPL-SCNC: 4.8 MMOL/L (ref 3.7–5.3)
POTASSIUM SERPL-SCNC: 5 MMOL/L (ref 3.7–5.3)
RBC # BLD: 2.92 M/UL (ref 3.95–5.11)
RBC # BLD: 3.03 M/UL (ref 3.95–5.11)
SAMPLE SITE: ABNORMAL
SODIUM BLD-SCNC: 147 MMOL/L (ref 135–144)
SODIUM BLD-SCNC: 150 MMOL/L (ref 135–144)
TCO2 (CALC), ART: 32 MMOL/L (ref 22–29)
WBC # BLD: 9.6 K/UL (ref 3.5–11.3)
WBC # BLD: 9.8 K/UL (ref 3.5–11.3)

## 2018-02-10 PROCEDURE — 99233 SBSQ HOSP IP/OBS HIGH 50: CPT | Performed by: INTERNAL MEDICINE

## 2018-02-10 PROCEDURE — 6360000002 HC RX W HCPCS: Performed by: STUDENT IN AN ORGANIZED HEALTH CARE EDUCATION/TRAINING PROGRAM

## 2018-02-10 PROCEDURE — 36415 COLL VENOUS BLD VENIPUNCTURE: CPT

## 2018-02-10 PROCEDURE — P9016 RBC LEUKOCYTES REDUCED: HCPCS

## 2018-02-10 PROCEDURE — 94762 N-INVAS EAR/PLS OXIMTRY CONT: CPT

## 2018-02-10 PROCEDURE — 86850 RBC ANTIBODY SCREEN: CPT

## 2018-02-10 PROCEDURE — 86901 BLOOD TYPING SEROLOGIC RH(D): CPT

## 2018-02-10 PROCEDURE — 36600 WITHDRAWAL OF ARTERIAL BLOOD: CPT

## 2018-02-10 PROCEDURE — 2580000003 HC RX 258: Performed by: INTERNAL MEDICINE

## 2018-02-10 PROCEDURE — 86900 BLOOD TYPING SEROLOGIC ABO: CPT

## 2018-02-10 PROCEDURE — 83735 ASSAY OF MAGNESIUM: CPT

## 2018-02-10 PROCEDURE — 6360000002 HC RX W HCPCS: Performed by: INTERNAL MEDICINE

## 2018-02-10 PROCEDURE — 6370000000 HC RX 637 (ALT 250 FOR IP): Performed by: INTERNAL MEDICINE

## 2018-02-10 PROCEDURE — 99291 CRITICAL CARE FIRST HOUR: CPT | Performed by: INTERNAL MEDICINE

## 2018-02-10 PROCEDURE — 80048 BASIC METABOLIC PNL TOTAL CA: CPT

## 2018-02-10 PROCEDURE — 94003 VENT MGMT INPAT SUBQ DAY: CPT

## 2018-02-10 PROCEDURE — 82803 BLOOD GASES ANY COMBINATION: CPT

## 2018-02-10 PROCEDURE — 71045 X-RAY EXAM CHEST 1 VIEW: CPT

## 2018-02-10 PROCEDURE — 2580000003 HC RX 258: Performed by: STUDENT IN AN ORGANIZED HEALTH CARE EDUCATION/TRAINING PROGRAM

## 2018-02-10 PROCEDURE — 86920 COMPATIBILITY TEST SPIN: CPT

## 2018-02-10 PROCEDURE — 2500000003 HC RX 250 WO HCPCS: Performed by: INTERNAL MEDICINE

## 2018-02-10 PROCEDURE — 36430 TRANSFUSION BLD/BLD COMPNT: CPT

## 2018-02-10 PROCEDURE — 84100 ASSAY OF PHOSPHORUS: CPT

## 2018-02-10 PROCEDURE — 6370000000 HC RX 637 (ALT 250 FOR IP): Performed by: HOSPITALIST

## 2018-02-10 PROCEDURE — 2000000000 HC ICU R&B

## 2018-02-10 PROCEDURE — 85027 COMPLETE CBC AUTOMATED: CPT

## 2018-02-10 RX ORDER — 0.9 % SODIUM CHLORIDE 0.9 %
250 INTRAVENOUS SOLUTION INTRAVENOUS ONCE
Status: COMPLETED | OUTPATIENT
Start: 2018-02-10 | End: 2018-02-11

## 2018-02-10 RX ORDER — CHLOROTHIAZIDE 250 MG/1
250 TABLET ORAL ONCE
Status: COMPLETED | OUTPATIENT
Start: 2018-02-10 | End: 2018-02-10

## 2018-02-10 RX ADMIN — CISATRACURIUM BESYLATE 3.25 MCG/KG/MIN: 10 INJECTION INTRAVENOUS at 12:16

## 2018-02-10 RX ADMIN — Medication 10 ML: at 08:46

## 2018-02-10 RX ADMIN — CHLOROTHIAZIDE 250 MG: 250 TABLET ORAL at 15:08

## 2018-02-10 RX ADMIN — PROPOFOL 40 MCG/KG/MIN: 10 INJECTION, EMULSION INTRAVENOUS at 01:00

## 2018-02-10 RX ADMIN — Medication 150 MCG/HR: at 12:09

## 2018-02-10 RX ADMIN — FAMOTIDINE 20 MG: 20 TABLET, FILM COATED ORAL at 08:44

## 2018-02-10 RX ADMIN — HEPARIN SODIUM 5000 UNITS: 5000 INJECTION, SOLUTION INTRAVENOUS; SUBCUTANEOUS at 23:41

## 2018-02-10 RX ADMIN — DESMOPRESSIN ACETATE 40 MG: 0.2 TABLET ORAL at 19:49

## 2018-02-10 RX ADMIN — HEPARIN SODIUM 5000 UNITS: 5000 INJECTION, SOLUTION INTRAVENOUS; SUBCUTANEOUS at 06:37

## 2018-02-10 RX ADMIN — SODIUM CHLORIDE 250 ML: 0.9 INJECTION, SOLUTION INTRAVENOUS at 15:08

## 2018-02-10 RX ADMIN — CISATRACURIUM BESYLATE 3.25 MCG/KG/MIN: 10 INJECTION INTRAVENOUS at 19:34

## 2018-02-10 RX ADMIN — GLYCOPYRROLATE 0.1 MG: 0.2 INJECTION INTRAMUSCULAR; INTRAVENOUS at 08:45

## 2018-02-10 RX ADMIN — SODIUM CHLORIDE: 9 INJECTION, SOLUTION INTRAVENOUS at 00:25

## 2018-02-10 RX ADMIN — HEPARIN SODIUM 5000 UNITS: 5000 INJECTION, SOLUTION INTRAVENOUS; SUBCUTANEOUS at 15:13

## 2018-02-10 RX ADMIN — CISATRACURIUM BESYLATE 3.25 MCG/KG/MIN: 10 INJECTION INTRAVENOUS at 05:47

## 2018-02-10 RX ADMIN — PROPOFOL 40 MCG/KG/MIN: 10 INJECTION, EMULSION INTRAVENOUS at 06:46

## 2018-02-10 RX ADMIN — PROPOFOL 40 MCG/KG/MIN: 10 INJECTION, EMULSION INTRAVENOUS at 21:47

## 2018-02-10 RX ADMIN — FAMOTIDINE 20 MG: 20 TABLET, FILM COATED ORAL at 19:49

## 2018-02-10 RX ADMIN — PROPOFOL 40 MCG/KG/MIN: 10 INJECTION, EMULSION INTRAVENOUS at 12:46

## 2018-02-10 RX ADMIN — HYDRALAZINE HYDROCHLORIDE 10 MG: 20 INJECTION INTRAMUSCULAR; INTRAVENOUS at 00:23

## 2018-02-10 RX ADMIN — PROPOFOL 45 MCG/KG/MIN: 10 INJECTION, EMULSION INTRAVENOUS at 15:59

## 2018-02-10 RX ADMIN — Medication 150 MCG/HR: at 05:44

## 2018-02-10 RX ADMIN — PROPOFOL 40 MCG/KG/MIN: 10 INJECTION, EMULSION INTRAVENOUS at 03:57

## 2018-02-10 RX ADMIN — PROPOFOL 40 MCG/KG/MIN: 10 INJECTION, EMULSION INTRAVENOUS at 19:00

## 2018-02-10 RX ADMIN — PROPOFOL 30 MCG/KG/MIN: 10 INJECTION, EMULSION INTRAVENOUS at 10:20

## 2018-02-10 NOTE — PLAN OF CARE
Problem: OXYGENATION/RESPIRATORY FUNCTION  Goal: Patient will maintain patent airway  Outcome: Ongoing    Goal: Patient will achieve/maintain normal respiratory rate/effort  Respiratory rate and effort will be within normal limits for the patient   Outcome: Ongoing      Problem: MECHANICAL VENTILATION  Goal: Patient will maintain patent airway  Outcome: Ongoing    Goal: Oral health is maintained or improved  Outcome: Ongoing    Goal: ET tube will be managed safely  Outcome: Ongoing    Goal: Ability to express needs and understand communication  Outcome: Ongoing    Goal: Mobility/activity is maintained at optimum level for patient  Outcome: Ongoing      Problem: SKIN INTEGRITY  Goal: Skin integrity is maintained or improved  Outcome: Ongoing

## 2018-02-10 NOTE — PROGRESS NOTES
24hrs    LABS  WBC 15.2-11.8-12.1  Procalcitonin:2/2/18 0.85 (high)    IMAGING  1/26/18 cxr: worsening diffuse b/l airspace opacities suggestive of multilobar pneumonia or pulmonary edema     1/26/18 echo: hyperdynamic left ventricle, otherwise normal    1/30/18 cxr: patchy opacification bialterally   2-7-18: CXR diffuse bilateral involvement. ARDS. No major change. 2/8/18:   Limited evaluation secondary to portable technique; however, cardiomegaly and   pulmonary edema do not appear significantly changed.  Endotracheal tube   positioning similar. DISCUSSION:  Patient with COPD, morbid obesity. Presented with acute rhinoviral infection, pneumonia and respiratory failure. Has required ventilatory support. Clinical picture suggestive of ARDS. Patient is undergoing treatment with the ARDS protocol. Some improvement has been achieved. There is no treatment for the acute rhinoviral infection. Ceftriaxone was given for presumptive associated community-acquired pneumonia, although cultures have not yielded any bacterial growth. Course of rocephin completed. Patient remains critically ill. Ventilation remains a challenge, she desaturated readily when moved or return. She also becomes bradycardic when moved. Discussed with WILMAR RN.       Physical Examination :     Patient Vitals for the past 8 hrs:   BP Temp Temp src Pulse Resp SpO2   02/10/18 1215 - - - 78 (!) 0 96 %   02/10/18 1200 (!) 119/52 99.3 °F (37.4 °C) Oral 74 (!) 0 95 %   02/10/18 1100 (!) 115/52 - - 74 (!) 1 95 %   02/10/18 1000 (!) 112/38 - - 81 (!) 0 96 %   02/10/18 0940 - - - 86 (!) 0 96 %   02/10/18 0900 (!) 123/40 - - 84 (!) 0 90 %   02/10/18 0827 - - - 72 (!) 6 94 %   02/10/18 0800 (!) 113/42 100.2 °F (37.9 °C) Oral 79 (!) 0 93 %   02/10/18 0700 (!) 127/38 - - 83 (!) 0 96 %   02/10/18 0645 (!) 140/46 - - 84 (!) 0 97 %   02/10/18 0630 (!) 148/41 - - 84 (!) 0 94 %   02/10/18 0615 (!) 147/45 - - 83 (!) 1 93 %   02/10/18 0605 - - - 69 Nightly       Thank you for allowing us to participate in the care of this patient. Please call with questions. Medina Epperson MD  Pager: [phone]  - Office: [phone]      I have examined the patient, reviewed the patient's medical history in detail and updated the computerized patient record. Above exam and data confirmed.     Bijal Nayg MD

## 2018-02-10 NOTE — PROGRESS NOTES
INTENSIVE CARE UNIT  Resident Physician Progress Note    Patient - Melo Palencia  Date of Admission -  2018  8:09 PM  Date of Evaluation -  2/10/2018  Room and Bed Number -  0105/0105-01   Hospital Day - 15  Cc- ARDS    SUBJECTIVE:     OVERNIGHT EVENTS:        Episodes of desaturations to 80's, FIO2 increased to 75%, moderate secretions  Hypertensive episodes with -220, improved with titration up Fentanyl to 150 mcg/hr  No episodes of bradycardia  On propofol/nimbex/fentanyl gtt    TODAY:  Afebrile   UO good    AWAKE & FOLLOWING COMMANDS:  [x] No   [] Yes    SECRETIONS Amount:  [] Small [x] Moderate  [] Large  [] None  Color:     [] White [] Colored  [] Bloody    SEDATION:  RAAS Score:  [x] Propofol gtt  [] Versed gtt  [] Ativan gtt   [] No Sedation    PARALYZED:  [] No    [x] Yes Nimbex    VASOPRESSORS:  [x] No    [] Yes  [] Levophed [] Dopamine [] Vasopressin  [] Dobutamine [] Phenylephrine [] Epinephrine    Unable to obtain review of systems sedation and paralysis  OBJECTIVE:     VITAL SIGNS:  BP (!) 140/46   Pulse 83   Temp 99.7 °F (37.6 °C) (Oral)   Resp (!) 0   Ht 5' (1.524 m)   Wt (!) 342 lb (155.1 kg)   SpO2 96%   BMI 66.79 kg/m²   Tmax over 24 hours:  Temp (24hrs), Av.3 °F (37.4 °C), Min:99 °F (37.2 °C), Max:99.7 °F (37.6 °C)      Patient Vitals for the past 8 hrs:   BP Temp Temp src Pulse Resp SpO2   02/10/18 0700 - - - 83 (!) 0 96 %   02/10/18 0645 (!) 140/46 - - 84 (!) 0 97 %   02/10/18 0630 (!) 148/41 - - 84 (!) 0 94 %   02/10/18 0615 (!) 147/45 - - 83 (!) 1 93 %   02/10/18 0605 - - - 69 (!) 0 95 %   02/10/18 0600 (!) 105/36 - - 81 8 98 %   02/10/18 0545 (!) 148/46 - - 84 (!) 0 98 %   02/10/18 0530 (!) 147/50 - - 80 (!) 0 93 %   02/10/18 0515 (!) 150/59 - - 81 10 96 %   02/10/18 0500 (!) 129/44 - - 79 (!) 7 94 %   02/10/18 0445 (!) 132/51 - - 77 (!) 0 91 %   02/10/18 0430 (!) 123/43 - - 78 (!) 0 96 %   02/10/18 0415 (!) 113/42 - - 78 (!) 0 96 %   02/10/18 0400 (!) 118/40 99.7 °F (37.6 °C) Oral 79 (!) 0 96 %   02/10/18 0345 (!) 111/36 - - 81 (!) 0 98 %   02/10/18 0330 (!) 111/38 - - 82 (!) 0 97 %   02/10/18 0315 (!) 123/40 - - 83 (!) 0 97 %   02/10/18 0300 (!) 121/37 - - 85 (!) 0 96 %   02/10/18 0245 (!) 125/39 - - 83 (!) 0 95 %   02/10/18 0230 (!) 125/40 - - 85 (!) 0 94 %   02/10/18 0215 (!) 134/39 - - 86 (!) 0 94 %   02/10/18 0200 (!) 140/37 - - 85 (!) 0 94 %   02/10/18 0155 - - - 86 (!) 0 94 %   02/10/18 0145 (!) 148/40 - - 85 (!) 0 95 %   02/10/18 0130 (!) 148/41 - - 83 (!) 0 97 %   02/10/18 0115 (!) 175/38 - - 90 9 97 %   02/10/18 0100 (!) 221/67 - - 75 20 97 %   02/10/18 0045 (!) 192/55 - - 79 11 97 %   02/10/18 0030 (!) 176/41 - - 88 (!) 3 92 %   02/10/18 0027 (!) 181/45 - - 85 20 -   02/10/18 0023 (!) 246/72 - - - - -   02/10/18 0015 (!) 230/55 - - 73 27 (!) 87 %   02/10/18 0000 (!) 188/77 99.3 °F (37.4 °C) Oral 70 (!) 2 (!) 89 %         Intake/Output Summary (Last 24 hours) at 02/10/18 0749  Last data filed at 02/10/18 0500   Gross per 24 hour   Intake             2758 ml   Output             2140 ml   Net              618 ml       Date 02/10/18 0000 - 02/10/18 2359   Shift 9760-1540 3325-7050 2586-7301 24 Hour Total   I  N  T  A  K  E   I.V.  (mL/kg) 587  (3.8)   587  (3.8)    NG/GT  (mL/kg) 553  (3.6)   553  (3.6)    Shift Total  (mL/kg) 1140  (7.3)   1140  (7.3)   O  U  T  P  U  T   Urine  (mL/kg/hr) 550   550    Shift Total  (mL/kg) 550  (3.5)   550  (3.5)   Weight (kg) 155. 1 155. 1 155. 1 155.1     Wt Readings from Last 3 Encounters:   02/09/18 (!) 342 lb (155.1 kg)     Body mass index is 66.79 kg/m².         PHYSICAL EXAM:  Intubated and sedated and paralyzed with medication  Lungs high pitch oscillator sounds  Heart rate normal  Abdomen soft   Edema 1+    MEDICATIONS:  Scheduled Meds:   Glycopyrrolate  0.1 mg Intravenous BID    nicotine  1 patch Transdermal Daily    heparin (porcine)  5,000 Units Subcutaneous 3 times per day    atropine  0.4 mg Intravenous Once    sodium

## 2018-02-10 NOTE — PLAN OF CARE
infection signs and symptoms  Will show no infection signs and symptoms    Outcome: Not Met This Shift      Problem: Skin Integrity:  Goal: Absence of new skin breakdown  Absence of new skin breakdown   Outcome: Met This Shift    Goal: Will show no infection signs and symptoms  Will show no infection signs and symptoms    Outcome: Not Met This Shift

## 2018-02-11 ENCOUNTER — APPOINTMENT (OUTPATIENT)
Dept: GENERAL RADIOLOGY | Age: 43
DRG: 005 | End: 2018-02-11
Payer: MEDICARE

## 2018-02-11 LAB
ABO/RH: NORMAL
ACTION: NORMAL
ACTION: NORMAL
ALLEN TEST: POSITIVE
ANION GAP SERPL CALCULATED.3IONS-SCNC: 11 MMOL/L (ref 9–17)
ANTIBODY SCREEN: NEGATIVE
ARM BAND NUMBER: NORMAL
BLD PROD TYP BPU: NORMAL
BUN BLDV-MCNC: 32 MG/DL (ref 6–20)
BUN/CREAT BLD: ABNORMAL (ref 9–20)
CALCIUM SERPL-MCNC: 8.6 MG/DL (ref 8.6–10.4)
CHLORIDE BLD-SCNC: 107 MMOL/L (ref 98–107)
CO2: 28 MMOL/L (ref 20–31)
CREAT SERPL-MCNC: 0.63 MG/DL (ref 0.5–0.9)
CROSSMATCH RESULT: NORMAL
DATE AND TIME: NORMAL
DATE AND TIME: NORMAL
DISPENSE STATUS BLOOD BANK: NORMAL
EXPIRATION DATE: NORMAL
FIO2: 100
FIO2: 100
FIO2: 90
GFR AFRICAN AMERICAN: >60 ML/MIN
GFR NON-AFRICAN AMERICAN: >60 ML/MIN
GFR SERPL CREATININE-BSD FRML MDRD: ABNORMAL ML/MIN/{1.73_M2}
GFR SERPL CREATININE-BSD FRML MDRD: ABNORMAL ML/MIN/{1.73_M2}
GLUCOSE BLD-MCNC: 89 MG/DL (ref 70–99)
HCT VFR BLD CALC: 29.8 % (ref 36.3–47.1)
HEMOGLOBIN: 8.3 G/DL (ref 11.9–15.1)
MCH RBC QN AUTO: 25.6 PG (ref 25.2–33.5)
MCHC RBC AUTO-ENTMCNC: 27.9 G/DL (ref 28.4–34.8)
MCV RBC AUTO: 92 FL (ref 82.6–102.9)
MODE: ABNORMAL
NEGATIVE BASE EXCESS, ART: ABNORMAL (ref 0–2)
NOTIFY: NORMAL
NOTIFY: NORMAL
NRBC AUTOMATED: 0 PER 100 WBC
O2 DEVICE/FLOW/%: ABNORMAL
PATIENT TEMP: 37.4
PATIENT TEMP: ABNORMAL
PATIENT TEMP: ABNORMAL
PDW BLD-RTO: 15.9 % (ref 11.8–14.4)
PLATELET # BLD: 211 K/UL (ref 138–453)
PMV BLD AUTO: 11 FL (ref 8.1–13.5)
POC HCO3: 30.9 MMOL/L (ref 21–28)
POC HCO3: 32.2 MMOL/L (ref 21–28)
POC HCO3: 32.3 MMOL/L (ref 21–28)
POC O2 SATURATION: 85 % (ref 94–98)
POC O2 SATURATION: 90 % (ref 94–98)
POC O2 SATURATION: 92 % (ref 94–98)
POC PCO2 TEMP: 78 MM HG
POC PCO2 TEMP: ABNORMAL MM HG
POC PCO2 TEMP: ABNORMAL MM HG
POC PCO2: 65.8 MM HG (ref 35–48)
POC PCO2: 71.2 MM HG (ref 35–48)
POC PCO2: 76.3 MM HG (ref 35–48)
POC PH TEMP: 7.23
POC PH TEMP: ABNORMAL
POC PH TEMP: ABNORMAL
POC PH: 7.23 (ref 7.35–7.45)
POC PH: 7.26 (ref 7.35–7.45)
POC PH: 7.28 (ref 7.35–7.45)
POC PO2 TEMP: 64 MM HG
POC PO2 TEMP: ABNORMAL MM HG
POC PO2 TEMP: ABNORMAL MM HG
POC PO2: 61.8 MM HG (ref 83–108)
POC PO2: 68.6 MM HG (ref 83–108)
POC PO2: 72.4 MM HG (ref 83–108)
POSITIVE BASE EXCESS, ART: 3 (ref 0–3)
POSITIVE BASE EXCESS, ART: 3 (ref 0–3)
POSITIVE BASE EXCESS, ART: 4 (ref 0–3)
POTASSIUM SERPL-SCNC: 4.9 MMOL/L (ref 3.7–5.3)
RBC # BLD: 3.24 M/UL (ref 3.95–5.11)
READ BACK: YES
READ BACK: YES
SAMPLE SITE: ABNORMAL
SODIUM BLD-SCNC: 146 MMOL/L (ref 135–144)
TCO2 (CALC), ART: 33 MMOL/L (ref 22–29)
TCO2 (CALC), ART: 35 MMOL/L (ref 22–29)
TCO2 (CALC), ART: 35 MMOL/L (ref 22–29)
TRANSFUSION STATUS: NORMAL
UNIT DIVISION: 0
UNIT NUMBER: NORMAL
WBC # BLD: 11 K/UL (ref 3.5–11.3)

## 2018-02-11 PROCEDURE — 85027 COMPLETE CBC AUTOMATED: CPT

## 2018-02-11 PROCEDURE — 6360000002 HC RX W HCPCS: Performed by: STUDENT IN AN ORGANIZED HEALTH CARE EDUCATION/TRAINING PROGRAM

## 2018-02-11 PROCEDURE — 2000000000 HC ICU R&B

## 2018-02-11 PROCEDURE — 2580000003 HC RX 258: Performed by: INTERNAL MEDICINE

## 2018-02-11 PROCEDURE — 6370000000 HC RX 637 (ALT 250 FOR IP): Performed by: STUDENT IN AN ORGANIZED HEALTH CARE EDUCATION/TRAINING PROGRAM

## 2018-02-11 PROCEDURE — 6360000002 HC RX W HCPCS: Performed by: INTERNAL MEDICINE

## 2018-02-11 PROCEDURE — 80048 BASIC METABOLIC PNL TOTAL CA: CPT

## 2018-02-11 PROCEDURE — 6370000000 HC RX 637 (ALT 250 FOR IP): Performed by: HOSPITALIST

## 2018-02-11 PROCEDURE — 99233 SBSQ HOSP IP/OBS HIGH 50: CPT | Performed by: INTERNAL MEDICINE

## 2018-02-11 PROCEDURE — 71045 X-RAY EXAM CHEST 1 VIEW: CPT

## 2018-02-11 PROCEDURE — 82803 BLOOD GASES ANY COMBINATION: CPT

## 2018-02-11 PROCEDURE — 2500000003 HC RX 250 WO HCPCS: Performed by: INTERNAL MEDICINE

## 2018-02-11 PROCEDURE — 2580000003 HC RX 258: Performed by: STUDENT IN AN ORGANIZED HEALTH CARE EDUCATION/TRAINING PROGRAM

## 2018-02-11 PROCEDURE — 94003 VENT MGMT INPAT SUBQ DAY: CPT

## 2018-02-11 PROCEDURE — 99291 CRITICAL CARE FIRST HOUR: CPT | Performed by: INTERNAL MEDICINE

## 2018-02-11 PROCEDURE — 94762 N-INVAS EAR/PLS OXIMTRY CONT: CPT

## 2018-02-11 PROCEDURE — 6370000000 HC RX 637 (ALT 250 FOR IP): Performed by: INTERNAL MEDICINE

## 2018-02-11 PROCEDURE — 36600 WITHDRAWAL OF ARTERIAL BLOOD: CPT

## 2018-02-11 PROCEDURE — 36415 COLL VENOUS BLD VENIPUNCTURE: CPT

## 2018-02-11 RX ORDER — FUROSEMIDE 10 MG/ML
20 INJECTION INTRAMUSCULAR; INTRAVENOUS ONCE
Status: DISCONTINUED | OUTPATIENT
Start: 2018-02-11 | End: 2018-02-11

## 2018-02-11 RX ORDER — FUROSEMIDE 10 MG/ML
40 INJECTION INTRAMUSCULAR; INTRAVENOUS ONCE
Status: COMPLETED | OUTPATIENT
Start: 2018-02-11 | End: 2018-02-11

## 2018-02-11 RX ORDER — GLYCOPYRROLATE 0.2 MG/ML
0.1 INJECTION INTRAMUSCULAR; INTRAVENOUS EVERY 8 HOURS
Status: COMPLETED | OUTPATIENT
Start: 2018-02-11 | End: 2018-02-12

## 2018-02-11 RX ADMIN — CISATRACURIUM BESYLATE 3.25 MCG/KG/MIN: 10 INJECTION INTRAVENOUS at 02:59

## 2018-02-11 RX ADMIN — CISATRACURIUM BESYLATE 3.25 MCG/KG/MIN: 10 INJECTION INTRAVENOUS at 11:18

## 2018-02-11 RX ADMIN — Medication 10 ML: at 08:30

## 2018-02-11 RX ADMIN — FUROSEMIDE 40 MG: 10 INJECTION, SOLUTION INTRAMUSCULAR; INTRAVENOUS at 11:18

## 2018-02-11 RX ADMIN — Medication 150 MCG/HR: at 12:01

## 2018-02-11 RX ADMIN — POLYVINYL ALCOHOL 1 DROP: 14 SOLUTION/ DROPS OPHTHALMIC at 15:49

## 2018-02-11 RX ADMIN — PROPOFOL 30 MCG/KG/MIN: 10 INJECTION, EMULSION INTRAVENOUS at 20:08

## 2018-02-11 RX ADMIN — PROPOFOL 40 MCG/KG/MIN: 10 INJECTION, EMULSION INTRAVENOUS at 05:42

## 2018-02-11 RX ADMIN — PROPOFOL 30 MCG/KG/MIN: 10 INJECTION, EMULSION INTRAVENOUS at 08:31

## 2018-02-11 RX ADMIN — CISATRACURIUM BESYLATE 5.5 MCG/KG/MIN: 10 INJECTION INTRAVENOUS at 23:33

## 2018-02-11 RX ADMIN — FAMOTIDINE 20 MG: 20 TABLET, FILM COATED ORAL at 08:29

## 2018-02-11 RX ADMIN — HEPARIN SODIUM 5000 UNITS: 5000 INJECTION, SOLUTION INTRAVENOUS; SUBCUTANEOUS at 06:12

## 2018-02-11 RX ADMIN — Medication 150 MCG/HR: at 01:03

## 2018-02-11 RX ADMIN — FAMOTIDINE 20 MG: 20 TABLET, FILM COATED ORAL at 19:54

## 2018-02-11 RX ADMIN — HEPARIN SODIUM 5000 UNITS: 5000 INJECTION, SOLUTION INTRAVENOUS; SUBCUTANEOUS at 21:33

## 2018-02-11 RX ADMIN — PROPOFOL 40 MCG/KG/MIN: 10 INJECTION, EMULSION INTRAVENOUS at 00:35

## 2018-02-11 RX ADMIN — Medication 175 MCG/HR: at 06:21

## 2018-02-11 RX ADMIN — SODIUM CHLORIDE: 9 INJECTION, SOLUTION INTRAVENOUS at 05:42

## 2018-02-11 RX ADMIN — DESMOPRESSIN ACETATE 40 MG: 0.2 TABLET ORAL at 19:54

## 2018-02-11 RX ADMIN — ACETAMINOPHEN 650 MG: 325 TABLET ORAL at 19:54

## 2018-02-11 RX ADMIN — PROPOFOL 40 MCG/KG/MIN: 10 INJECTION, EMULSION INTRAVENOUS at 03:15

## 2018-02-11 RX ADMIN — GLYCOPYRROLATE 0.1 MG: 0.2 INJECTION INTRAMUSCULAR; INTRAVENOUS at 19:54

## 2018-02-11 RX ADMIN — Medication 150 MCG/HR: at 18:39

## 2018-02-11 RX ADMIN — GLYCOPYRROLATE 0.1 MG: 0.2 INJECTION INTRAMUSCULAR; INTRAVENOUS at 15:47

## 2018-02-11 RX ADMIN — PROPOFOL 30 MCG/KG/MIN: 10 INJECTION, EMULSION INTRAVENOUS at 23:35

## 2018-02-11 RX ADMIN — POLYVINYL ALCOHOL 1 DROP: 14 SOLUTION/ DROPS OPHTHALMIC at 22:37

## 2018-02-11 RX ADMIN — PROPOFOL 30 MCG/KG/MIN: 10 INJECTION, EMULSION INTRAVENOUS at 17:17

## 2018-02-11 RX ADMIN — HYDRALAZINE HYDROCHLORIDE 10 MG: 20 INJECTION INTRAMUSCULAR; INTRAVENOUS at 21:42

## 2018-02-11 RX ADMIN — CISATRACURIUM BESYLATE 3.25 MCG/KG/MIN: 10 INJECTION INTRAVENOUS at 17:17

## 2018-02-11 RX ADMIN — HEPARIN SODIUM 5000 UNITS: 5000 INJECTION, SOLUTION INTRAVENOUS; SUBCUTANEOUS at 15:47

## 2018-02-11 RX ADMIN — POLYVINYL ALCOHOL 1 DROP: 14 SOLUTION/ DROPS OPHTHALMIC at 08:30

## 2018-02-11 NOTE — PROGRESS NOTES
[facility] - Daily Progress Note  Today's Date and Time: 2/11/2018, 1:44 PM    Impression :   1. COPD  2. Pulmonary edema   3. Hypertensive emergency  4. Community acquired pneumonia  5. Acute respiratory failure  6. ARDS  7. NSTEMI  8. Rhinovirus infection  9. Respiratory failure- Ventilator    Recommendations:   · Supportive care  · Continue to monitor off antibiotics    Interval History:   INITIAL HISTORY:  Rima Carroll is a 43y.o.-year-old  female who was initially admitted on 1/26/2018. Patient seen at the request of Dr. Belem Joyce.     Patient w/ hx of COPD on home O2 2.5L initially presented to ED for shortness of breath w/ chest pain. For past week patient has had increased cough and production of yellowish sputum w/ progressively increasing SOB along w/ feelings of fever. In ED she was found to have O2 saturation in 30s and 40s, hypertension of 187/81 and a cxr showing potential multifocal pneumonia and pulmonary edema w/ leukocytosis and increased troponins. She was given aspirin and Plavix in ED and admitted.       On admission she was placed on ceftriaxone and azithromycin for potential pneumonia and given solumedrol q8, lasix BID, and cardene for hypertension and pulmonary edema. We were consulted because the influenza PCR results are pending and patient is unable to take tamiflu PO.  Inpatient team is requesting input regarding starting peramivir.         CURRENT EVALUATION: 2/11/2018   Afebrile  Blood pressure elevated  Hypertensive episodes were improved with titration of of fentanyl to 200 mcg/hr and Diprivan   On oscillator for ventilatory support, FiO2 90  Hyponatremia improving  Tropon emia  Stable, ischemic workup once current condition improves  WBC within normal limit  BUN improving, Cr Stable  Hb Stable  Chest x-ray from today showed cardiomegaly, pulmonary vascular congestion and bilateral lower airspace opacities unchanged                 CULTURES  1/26/18 blood cx: no growth  1/26/18 rine cx: no growth  1/29/18 sputum cx: no growth  2/4/18 urine cx: candida  CFU  2/4/18 blood cx: no growth 24hrs    LABS  WBC 15.2-11.8-12.1  Procalcitonin:2/2/18 0.85 (high)    IMAGING  Chest X-ray from 2-11-18:  Suboptimal examination.  Cardiomegaly, pulmonary vascular congestion, and   bilateral lower lobe airspace opacities remain essentially unchanged     1/26/18 cxr: worsening diffuse b/l airspace opacities suggestive of multilobar pneumonia or pulmonary edema     1/26/18 echo: hyperdynamic left ventricle, otherwise normal    1/30/18 cxr: patchy opacification bialterally   2-7-18: CXR diffuse bilateral involvement. ARDS. No major change. 2/8/18:   Limited evaluation secondary to portable technique; however, cardiomegaly and   pulmonary edema do not appear significantly changed.  Endotracheal tube   positioning similar. DISCUSSION:  Patient with COPD, morbid obesity. Presented with acute rhinoviral infection, pneumonia and respiratory failure. Has required ventilatory support. Clinical picture suggestive of ARDS. Patient is undergoing treatment with the ARDS protocol. Some improvement has been achieved. There is no treatment for the acute rhinoviral infection. Ceftriaxone was given for presumptive associated community-acquired pneumonia, although cultures have not yielded any bacterial growth. Course of rocephin completed. Patient remains critically ill. Ventilation remains a challenge, she desaturated readily when moved or return. She also becomes bradycardic when moved. Discussed with WILMAR RN.       Physical Examination :     Patient Vitals for the past 8 hrs:   BP Temp Temp src Pulse Resp SpO2   02/11/18 1300 (!) 144/52 - - 77 (!) 0 93 %   02/11/18 1208 - - - - - 98 %   02/11/18 1200 (!) 177/66 99.7 °F (37.6 °C) Oral 73 (!) 0 97 %   02/11/18 1145 - - - 73 (!) 0 98 %   02/11/18 1100 (!) 141/52 - - 75 (!) 0 96 %   18 1000 (!) 153/50 - - 78 (!) 0 96 %   18 0900 (!) 164/73 - - 68 10 93 %   18 0800 (!) 127/45 99.9 °F (37.7 °C) Oral 75 (!) 0 94 %   18 0730 - - - 78 (!) 0 93 %   18 0700 (!) 138/41 - - 78 (!) 3 91 %   18 0645 - - - 76 (!) 0 92 %   18 0630 (!) 144/54 - - 74 (!) 0 92 %   18 0615 - - - 90 21 99 %   18 0600 (!) 123/50 - - 74 (!) 0 97 %   18 0545 - - - 76 (!) 0 (!) 89 %     Temp (24hrs), Av.5 °F (37.5 °C), Min:98.4 °F (36.9 °C), Max:99.9 °F (37.7 °C)    General Appearance: sedated on the ventilator. No overall change  Eyes: Sclera anicteric; conjunctivae pink. No hemorhages, no embolic phenomena. ENT:Oropharynx clear, without erythema, exudate, or thrush. No nasal drainage. Neck: Supple, without lymphadenopathy. No JVD. Pulmonary/Chest: coarse rales and rhonchi bilaterally  Cardiovascular:Regular rate and rhythm without murmurs, rubs, or gallops. S1 and S2 normal.  Abdomen: Soft, non tender. Bowel sounds normal. No cramps. No organomegaly, FMS inplace  All four Extremities:No cyanosis, clubbing, or effusions. 1+ edema in lower and upper extremities  Neurologic:No gross sensory or motor deficits. Sedated  Skin: No rash or lesions. IV site inspected: no inflammation.     Medical Decision Making:   I have independently reviewed/ordered the following labs:  EXAMINATION:   SINGLE VIEW OF THE CHEST       2018 7:26 am       COMPARISON:   Chest x-ray from 2018       HISTORY:   ORDERING SYSTEM PROVIDED HISTORY: eval, ARDS   TECHNOLOGIST PROVIDED HISTORY:   Reason for exam:->eval, ARDS       FINDINGS:   Endotracheal tube extends to the mid thoracic trachea approximately 4.3 above   the lore.  Enteric tube is not well visualized throughout its course but   can be followed at least to the level of the mid thoracic esophagus.       There is stable enlargement of cardiac silhouette.  There is similar   prominence and indistinctness of the pulmonary (porcine)  5,000 Units Subcutaneous 3 times per day    sodium chloride flush  10 mL Intravenous 2 times per day    famotidine  20 mg Oral BID    sodium chloride flush  10 mL Intravenous 2 times per day    atorvastatin  40 mg Oral Nightly       Thank you for allowing us to participate in the care of this patient. Please call with questions. Raymond Soulier, MD      ATTESTATION:    I have discussed the case, including pertinent history and exam findings with the residents. I have seen and examined the patient and the key elements of the encounter have been performed by me. I have reviewed the laboratory data, other diagnostic studies and discussed them with the residents. I have updated the medical record where necessary. I agree with the assessment, plan and orders as documented by the resident.     Curtsi Marie MD.

## 2018-02-11 NOTE — PROGRESS NOTES
unchanged       CULTURES  1/26/18 blood cx: no growth  1/26/18 rine cx: no growth  1/29/18 sputum cx: no growth  2/4/18 urine cx: candida  CFU  2/4/18 blood cx: no growth 24hrs    LABS  WBC 15.2-11.8-12.1  Procalcitonin:2/2/18 0.85 (high)    IMAGING  Chest X-ray from 2-11-18:  Suboptimal examination.  Cardiomegaly, pulmonary vascular congestion, and   bilateral lower lobe airspace opacities remain essentially unchanged     1/26/18 cxr: worsening diffuse b/l airspace opacities suggestive of multilobar pneumonia or pulmonary edema     1/26/18 echo: hyperdynamic left ventricle, otherwise normal    1/30/18 cxr: patchy opacification bialterally   2-7-18: CXR diffuse bilateral involvement. ARDS. No major change. 2/8/18:   Limited evaluation secondary to portable technique; however, cardiomegaly and   pulmonary edema do not appear significantly changed.  Endotracheal tube   positioning similar. DISCUSSION:  Patient with COPD, morbid obesity. Presented with acute rhinoviral infection, pneumonia and respiratory failure. Has required ventilatory support. Clinical picture suggestive of ARDS. Patient is undergoing treatment with the ARDS protocol. Some improvement has been achieved. There is no treatment for the acute rhinoviral infection. Ceftriaxone was given for presumptive associated community-acquired pneumonia, although cultures have not yielded any bacterial growth. Course of rocephin completed. Patient remains critically ill. Ventilation remains a challenge, she desaturated readily when moved or return. She also becomes bradycardic when moved. Discussed with WILMAR RN.       Physical Examination :     Patient Vitals for the past 8 hrs:   BP Temp Temp src Pulse Resp SpO2   02/11/18 1300 (!) 144/52 - - 77 (!) 0 93 %   02/11/18 1208 - - - - - 98 %   02/11/18 1200 (!) 177/66 99.7 °F (37.6 °C) Oral 73 (!) 0 97 %   02/11/18 1145 - - - 73 (!) 0 98 %   02/11/18 1100 (!) 141/52 - - 75 (!) 0 96 % 18 1000 (!) 153/50 - - 78 (!) 0 96 %   18 0900 (!) 164/73 - - 68 10 93 %   18 0800 (!) 127/45 99.9 °F (37.7 °C) Oral 75 (!) 0 94 %   18 0730 - - - 78 (!) 0 93 %   18 0700 (!) 138/41 - - 78 (!) 3 91 %   18 0645 - - - 76 (!) 0 92 %   18 0630 (!) 144/54 - - 74 (!) 0 92 %   18 0615 - - - 90 21 99 %   18 0600 (!) 123/50 - - 74 (!) 0 97 %     Temp (24hrs), Av.5 °F (37.5 °C), Min:98.4 °F (36.9 °C), Max:99.9 °F (37.7 °C)    General Appearance: sedated on the ventilator. No overall change  Eyes: Sclera anicteric; conjunctivae pink. No hemorhages, no embolic phenomena. ENT:Oropharynx clear, without erythema, exudate, or thrush. No nasal drainage. Neck: Supple, without lymphadenopathy. No JVD. Pulmonary/Chest: coarse rales and rhonchi bilaterally  Cardiovascular:Regular rate and rhythm without murmurs, rubs, or gallops. S1 and S2 normal.  Abdomen: Soft, non tender. Bowel sounds normal. No cramps. No organomegaly, FMS inplace  All four Extremities:No cyanosis, clubbing, or effusions. 1+ edema in lower and upper extremities  Neurologic:No gross sensory or motor deficits. Sedated  Skin: No rash or lesions. IV site inspected: no inflammation.     Medical Decision Making:   I have independently reviewed/ordered the following labs:  EXAMINATION:   SINGLE VIEW OF THE CHEST       2018 7:26 am       COMPARISON:   Chest x-ray from 2018       HISTORY:   ORDERING SYSTEM PROVIDED HISTORY: eval, ARDS   TECHNOLOGIST PROVIDED HISTORY:   Reason for exam:->eval, ARDS       FINDINGS:   Endotracheal tube extends to the mid thoracic trachea approximately 4.3 above   the lore.  Enteric tube is not well visualized throughout its course but   can be followed at least to the level of the mid thoracic esophagus.       There is stable enlargement of cardiac silhouette.  There is similar   prominence and indistinctness of the pulmonary vasculature and diffuse   pulmonary haziness

## 2018-02-11 NOTE — PROGRESS NOTES
Abg completed, critical CO2 noted, results given to Dr. Carmen Chase and no changes made at this time.

## 2018-02-11 NOTE — PROGRESS NOTES
02/11/18 0440   Vent Information   Vent Mode HFOV  (AMP INCREASED TO 60)   FiO2  80 %     Changes made per morning ABG

## 2018-02-11 NOTE — PROGRESS NOTES
INTENSIVE CARE UNIT  Resident Physician Progress Note    Patient - Alyx Butler  Date of Admission -  2018  8:09 PM  Date of Evaluation -  2018  Room and Bed Number -  0105/0105-01   Hospital Day - 16  Cc- ARDS    SUBJECTIVE:     OVERNIGHT EVENTS:        Episodes of desaturations to 80's during which FIO2 increased to % , moderate secretions  Amp HFV increased 55 to 60 due to worsening ABG.   Hypertensive episodes  improved with titration up Fentanyl to 200 mcg/hr and Deprivan  T max 99.6 f  Tolerating quarter turns in bed    On propofol/nimbex/fentanyl gtt    TODAY:  Afebrile   UO good    AWAKE & FOLLOWING COMMANDS:  [x] No   [] Yes    SECRETIONS Amount:  [] Small [] Moderate  [x] Large  [] None  Color:     [x] White frothy  [] Colored  [] Bloody    SEDATION:  RAAS Score:  [x] Propofol gtt  [] Versed gtt  [] Ativan gtt   [] No Sedation    PARALYZED:  [] No    [x] Yes Nimbex    VASOPRESSORS:  [x] No    [] Yes  [] Levophed [] Dopamine [] Vasopressin  [] Dobutamine [] Phenylephrine [] Epinephrine    Unable to obtain review of systems sedation and paralysis  OBJECTIVE:     VITAL SIGNS:  BP (!) 138/41   Pulse 78   Temp 99.7 °F (37.6 °C) (Oral)   Resp (!) 0   Ht 5' (1.524 m)   Wt (!) 342 lb (155.1 kg)   SpO2 93%   BMI 66.79 kg/m²   Tmax over 24 hours:  Temp (24hrs), Av.3 °F (37.4 °C), Min:98.4 °F (36.9 °C), Max:99.7 °F (37.6 °C)      Patient Vitals for the past 8 hrs:   BP Temp Temp src Pulse Resp SpO2   18 0730 - - - 78 (!) 0 93 %   18 0700 (!) 138/41 - - 78 (!) 3 91 %   18 0645 - - - 76 (!) 0 92 %   18 0630 (!) 144/54 - - 74 (!) 0 92 %   18 0615 - - - 90 21 99 %   18 0600 (!) 123/50 - - 74 (!) 0 97 %   18 0545 - - - 76 (!) 0 (!) 89 %   18 0530 (!) 102/32 - - 77 20 100 %   18 0515 - - - 74 (!) 0 94 %   18 0500 (!) 119/47 - - 73 (!) 0 94 %   18 0445 - - - 74 (!) 0 97 %   18 0430 (!) 129/50 - - 77 (!) 0 96 %   18 NITRU NEGATIVE 01/26/2018    COLORU YELLOW 01/26/2018    PHUR 6.5 01/26/2018    WBCUA 2 TO 5 01/26/2018    RBCUA 0 TO 2 01/26/2018    MUCUS 1+ 01/26/2018    TRICHOMONAS NOT REPORTED 01/26/2018    YEAST NOT REPORTED 01/26/2018    BACTERIA NOT REPORTED 01/26/2018    SPECGRAV 1.013 01/26/2018    LEUKOCYTESUR NEGATIVE 01/26/2018    UROBILINOGEN Normal 01/26/2018    BILIRUBINUR NEGATIVE 01/26/2018    GLUCOSEU NEGATIVE 01/26/2018    KETUA TRACE 01/26/2018    AMORPHOUS NOT REPORTED 01/26/2018       HgBA1c:    Lab Results   Component Value Date    LABA1C 5.2 01/27/2018     TSH:    Lab Results   Component Value Date    TSH 1.23 01/27/2018     Lactic Acid: No results found for: LACTA   Troponin: No results for input(s): TROPONINI in the last 72 hours. Microbiology:    No new    Other Labs:        Radiology/Imaging:   Xray chest 2/10/18  AP portable view of the chest time stamped at 821 hours demonstrates   overlying cardiac monitoring electrodes.  Endotracheal tube terminates 4.6 cm   above the lore.  The patient has cardiomegaly and pulmonary vascular   congestion with perihilar opacities consistent with pulmonary edema.  Lower   lung zones are obscured by overlying soft tissue.  Bilateral effusions are   evident.  Bibasilar opacities are noted, likely edema or atelectasis.  No   extrapleural air is noted           ASSESSMENT:     Principal Problem:    ARDS (adult respiratory distress syndrome) ([facility])  Active Problems:    Pneumonia of both lower lobes due to infectious organism    NSTEMI (non-ST elevated myocardial infarction) ([facility] [address]    Acute pulmonary edema (HCC)    Hypertensive emergency    Acute respiratory failure with hypoxia and hypercapnia (HCC)    Smoker    Morbid obesity (HCC)    Elevated troponin    Obesity hypoventilation syndrome (HCC)    SOB (shortness of breath)    COPD exacerbation (HCC)    Fever    Disease due to rhinovirus  Resolved Problems:    * No resolved hospital problems.  *          PLAN:

## 2018-02-12 ENCOUNTER — APPOINTMENT (OUTPATIENT)
Dept: GENERAL RADIOLOGY | Age: 43
DRG: 005 | End: 2018-02-12
Payer: MEDICARE

## 2018-02-12 LAB
ALLEN TEST: POSITIVE
ANION GAP SERPL CALCULATED.3IONS-SCNC: 13 MMOL/L (ref 9–17)
ANION GAP SERPL CALCULATED.3IONS-SCNC: 15 MMOL/L (ref 9–17)
BUN BLDV-MCNC: 27 MG/DL (ref 6–20)
BUN BLDV-MCNC: 28 MG/DL (ref 6–20)
BUN/CREAT BLD: ABNORMAL (ref 9–20)
BUN/CREAT BLD: ABNORMAL (ref 9–20)
CALCIUM SERPL-MCNC: 8.6 MG/DL (ref 8.6–10.4)
CALCIUM SERPL-MCNC: 8.9 MG/DL (ref 8.6–10.4)
CHLORIDE BLD-SCNC: 100 MMOL/L (ref 98–107)
CHLORIDE BLD-SCNC: 99 MMOL/L (ref 98–107)
CO2: 28 MMOL/L (ref 20–31)
CO2: 28 MMOL/L (ref 20–31)
CREAT SERPL-MCNC: 0.58 MG/DL (ref 0.5–0.9)
CREAT SERPL-MCNC: 0.58 MG/DL (ref 0.5–0.9)
FIO2: 70
GFR AFRICAN AMERICAN: >60 ML/MIN
GFR AFRICAN AMERICAN: >60 ML/MIN
GFR NON-AFRICAN AMERICAN: >60 ML/MIN
GFR NON-AFRICAN AMERICAN: >60 ML/MIN
GFR SERPL CREATININE-BSD FRML MDRD: ABNORMAL ML/MIN/{1.73_M2}
GLUCOSE BLD-MCNC: 150 MG/DL (ref 70–99)
GLUCOSE BLD-MCNC: 98 MG/DL (ref 70–99)
HCT VFR BLD CALC: 31.1 % (ref 36.3–47.1)
HEMOGLOBIN: 9.2 G/DL (ref 11.9–15.1)
MAGNESIUM: 1.7 MG/DL (ref 1.6–2.6)
MCH RBC QN AUTO: 25.8 PG (ref 25.2–33.5)
MCHC RBC AUTO-ENTMCNC: 29.6 G/DL (ref 28.4–34.8)
MCV RBC AUTO: 87.1 FL (ref 82.6–102.9)
MODE: ABNORMAL
NEGATIVE BASE EXCESS, ART: ABNORMAL (ref 0–2)
NRBC AUTOMATED: 0.2 PER 100 WBC
O2 DEVICE/FLOW/%: ABNORMAL
PATIENT TEMP: ABNORMAL
PDW BLD-RTO: 15.9 % (ref 11.8–14.4)
PLATELET # BLD: 225 K/UL (ref 138–453)
PMV BLD AUTO: 10.8 FL (ref 8.1–13.5)
POC HCO3: 29.2 MMOL/L (ref 21–28)
POC O2 SATURATION: 89 % (ref 94–98)
POC PCO2 TEMP: ABNORMAL MM HG
POC PCO2: 44.2 MM HG (ref 35–48)
POC PH TEMP: ABNORMAL
POC PH: 7.43 (ref 7.35–7.45)
POC PO2 TEMP: ABNORMAL MM HG
POC PO2: 55.4 MM HG (ref 83–108)
POSITIVE BASE EXCESS, ART: 4 (ref 0–3)
POTASSIUM SERPL-SCNC: 3.7 MMOL/L (ref 3.7–5.3)
POTASSIUM SERPL-SCNC: 3.8 MMOL/L (ref 3.7–5.3)
RBC # BLD: 3.57 M/UL (ref 3.95–5.11)
SAMPLE SITE: ABNORMAL
SODIUM BLD-SCNC: 140 MMOL/L (ref 135–144)
SODIUM BLD-SCNC: 143 MMOL/L (ref 135–144)
TCO2 (CALC), ART: 31 MMOL/L (ref 22–29)
WBC # BLD: 11.3 K/UL (ref 3.5–11.3)

## 2018-02-12 PROCEDURE — 6370000000 HC RX 637 (ALT 250 FOR IP): Performed by: INTERNAL MEDICINE

## 2018-02-12 PROCEDURE — 2580000003 HC RX 258: Performed by: INTERNAL MEDICINE

## 2018-02-12 PROCEDURE — 80048 BASIC METABOLIC PNL TOTAL CA: CPT

## 2018-02-12 PROCEDURE — 82803 BLOOD GASES ANY COMBINATION: CPT

## 2018-02-12 PROCEDURE — 6360000002 HC RX W HCPCS: Performed by: INTERNAL MEDICINE

## 2018-02-12 PROCEDURE — 2000000000 HC ICU R&B

## 2018-02-12 PROCEDURE — 6370000000 HC RX 637 (ALT 250 FOR IP): Performed by: HOSPITALIST

## 2018-02-12 PROCEDURE — 36415 COLL VENOUS BLD VENIPUNCTURE: CPT

## 2018-02-12 PROCEDURE — 93005 ELECTROCARDIOGRAM TRACING: CPT

## 2018-02-12 PROCEDURE — 94003 VENT MGMT INPAT SUBQ DAY: CPT

## 2018-02-12 PROCEDURE — 71045 X-RAY EXAM CHEST 1 VIEW: CPT

## 2018-02-12 PROCEDURE — 6360000002 HC RX W HCPCS: Performed by: STUDENT IN AN ORGANIZED HEALTH CARE EDUCATION/TRAINING PROGRAM

## 2018-02-12 PROCEDURE — 2580000003 HC RX 258: Performed by: STUDENT IN AN ORGANIZED HEALTH CARE EDUCATION/TRAINING PROGRAM

## 2018-02-12 PROCEDURE — 97110 THERAPEUTIC EXERCISES: CPT

## 2018-02-12 PROCEDURE — 83735 ASSAY OF MAGNESIUM: CPT

## 2018-02-12 PROCEDURE — 99291 CRITICAL CARE FIRST HOUR: CPT | Performed by: INTERNAL MEDICINE

## 2018-02-12 PROCEDURE — 94762 N-INVAS EAR/PLS OXIMTRY CONT: CPT

## 2018-02-12 PROCEDURE — 6370000000 HC RX 637 (ALT 250 FOR IP): Performed by: STUDENT IN AN ORGANIZED HEALTH CARE EDUCATION/TRAINING PROGRAM

## 2018-02-12 PROCEDURE — 2500000003 HC RX 250 WO HCPCS: Performed by: INTERNAL MEDICINE

## 2018-02-12 PROCEDURE — 6360000002 HC RX W HCPCS

## 2018-02-12 PROCEDURE — 99233 SBSQ HOSP IP/OBS HIGH 50: CPT | Performed by: INTERNAL MEDICINE

## 2018-02-12 PROCEDURE — 36600 WITHDRAWAL OF ARTERIAL BLOOD: CPT

## 2018-02-12 PROCEDURE — 85027 COMPLETE CBC AUTOMATED: CPT

## 2018-02-12 RX ORDER — FUROSEMIDE 10 MG/ML
20 INJECTION INTRAMUSCULAR; INTRAVENOUS ONCE
Status: COMPLETED | OUTPATIENT
Start: 2018-02-12 | End: 2018-02-12

## 2018-02-12 RX ORDER — CLONIDINE HYDROCHLORIDE 0.1 MG/1
0.1 TABLET ORAL ONCE
Status: DISCONTINUED | OUTPATIENT
Start: 2018-02-12 | End: 2018-02-12

## 2018-02-12 RX ORDER — FUROSEMIDE 10 MG/ML
INJECTION INTRAMUSCULAR; INTRAVENOUS
Status: COMPLETED
Start: 2018-02-12 | End: 2018-02-12

## 2018-02-12 RX ORDER — GLYCOPYRROLATE 0.2 MG/ML
0.1 INJECTION INTRAMUSCULAR; INTRAVENOUS 2 TIMES DAILY
Status: COMPLETED | OUTPATIENT
Start: 2018-02-12 | End: 2018-02-12

## 2018-02-12 RX ADMIN — CISATRACURIUM BESYLATE 6.5 MCG/KG/MIN: 10 INJECTION INTRAVENOUS at 06:53

## 2018-02-12 RX ADMIN — CISATRACURIUM BESYLATE 5.5 MCG/KG/MIN: 10 INJECTION INTRAVENOUS at 20:06

## 2018-02-12 RX ADMIN — Medication 150 MCG/HR: at 06:53

## 2018-02-12 RX ADMIN — PROPOFOL 20 MCG/KG/MIN: 10 INJECTION, EMULSION INTRAVENOUS at 13:38

## 2018-02-12 RX ADMIN — PROPOFOL 30 MCG/KG/MIN: 10 INJECTION, EMULSION INTRAVENOUS at 04:17

## 2018-02-12 RX ADMIN — FUROSEMIDE 20 MG: 10 INJECTION, SOLUTION INTRAVENOUS at 16:05

## 2018-02-12 RX ADMIN — HYDRALAZINE HYDROCHLORIDE 10 MG: 20 INJECTION INTRAMUSCULAR; INTRAVENOUS at 03:13

## 2018-02-12 RX ADMIN — FAMOTIDINE 20 MG: 20 TABLET, FILM COATED ORAL at 19:58

## 2018-02-12 RX ADMIN — CISATRACURIUM BESYLATE 6.5 MCG/KG/MIN: 10 INJECTION INTRAVENOUS at 02:53

## 2018-02-12 RX ADMIN — Medication 150 MCG/HR: at 01:01

## 2018-02-12 RX ADMIN — HYDRALAZINE HYDROCHLORIDE 10 MG: 20 INJECTION INTRAMUSCULAR; INTRAVENOUS at 17:08

## 2018-02-12 RX ADMIN — HEPARIN SODIUM 5000 UNITS: 5000 INJECTION, SOLUTION INTRAVENOUS; SUBCUTANEOUS at 20:57

## 2018-02-12 RX ADMIN — FUROSEMIDE 20 MG: 10 INJECTION INTRAMUSCULAR; INTRAVENOUS at 16:05

## 2018-02-12 RX ADMIN — FAMOTIDINE 20 MG: 20 TABLET, FILM COATED ORAL at 08:15

## 2018-02-12 RX ADMIN — Medication 50 MCG/HR: at 14:43

## 2018-02-12 RX ADMIN — Medication 10 ML: at 08:14

## 2018-02-12 RX ADMIN — CLONIDINE HYDROCHLORIDE 0.1 MG: 0.1 TABLET ORAL at 04:38

## 2018-02-12 RX ADMIN — HEPARIN SODIUM 5000 UNITS: 5000 INJECTION, SOLUTION INTRAVENOUS; SUBCUTANEOUS at 05:23

## 2018-02-12 RX ADMIN — CLONIDINE HYDROCHLORIDE 0.1 MG: 0.1 TABLET ORAL at 18:13

## 2018-02-12 RX ADMIN — Medication 200 MCG/HR: at 20:11

## 2018-02-12 RX ADMIN — PROPOFOL 30 MCG/KG/MIN: 10 INJECTION, EMULSION INTRAVENOUS at 18:57

## 2018-02-12 RX ADMIN — GLYCOPYRROLATE 0.1 MG: 0.2 INJECTION INTRAMUSCULAR; INTRAVENOUS at 05:24

## 2018-02-12 RX ADMIN — PROPOFOL 25 MCG/KG/MIN: 10 INJECTION, EMULSION INTRAVENOUS at 09:29

## 2018-02-12 RX ADMIN — PROPOFOL 30 MCG/KG/MIN: 10 INJECTION, EMULSION INTRAVENOUS at 22:19

## 2018-02-12 RX ADMIN — GLYCOPYRROLATE 0.1 MG: 0.2 INJECTION INTRAMUSCULAR; INTRAVENOUS at 19:57

## 2018-02-12 RX ADMIN — CISATRACURIUM BESYLATE 6 MCG/KG/MIN: 10 INJECTION INTRAVENOUS at 16:33

## 2018-02-12 RX ADMIN — HEPARIN SODIUM 5000 UNITS: 5000 INJECTION, SOLUTION INTRAVENOUS; SUBCUTANEOUS at 15:00

## 2018-02-12 RX ADMIN — CISATRACURIUM BESYLATE 6.5 MCG/KG/MIN: 10 INJECTION INTRAVENOUS at 11:42

## 2018-02-12 RX ADMIN — FUROSEMIDE 20 MG: 10 INJECTION, SOLUTION INTRAMUSCULAR; INTRAVENOUS at 08:03

## 2018-02-12 RX ADMIN — SODIUM CHLORIDE: 9 INJECTION, SOLUTION INTRAVENOUS at 01:00

## 2018-02-12 RX ADMIN — GLYCOPYRROLATE 0.1 MG: 0.2 INJECTION INTRAMUSCULAR; INTRAVENOUS at 10:22

## 2018-02-12 RX ADMIN — DESMOPRESSIN ACETATE 40 MG: 0.2 TABLET ORAL at 19:58

## 2018-02-12 NOTE — PROGRESS NOTES
12.5 g PRN   glucagon (rDNA) 1 mg PRN   dextrose 100 mL/hr PRN   lidocaine viscous 5 mL PRN   magnesium hydroxide 30 mL Daily PRN   ondansetron 4 mg Q6H PRN   potassium chloride 40 mEq PRN   Or     potassium chloride 40 mEq PRN   Or     potassium chloride 10 mEq PRN   sodium chloride flush 10 mL PRN   acetaminophen 650 mg Q4H PRN       SUPPORT DEVICES: [x] Ventilator [] BIPAP  [] Nasal Cannula [] Room Air    VENT SETTINGS (Comprehensive) (if applicable):  HFV, FiO2 80%   Vent Information  Ventilator Started: Yes  Ventilation Day(s): 5  Vent Type: 3100B  Vent Mode: HFOV (Hz 5, Amp 64)  Vt Ordered:  (AMP 61)  Vt Exhaled: 321 mL  Pressure Ordered: 10  Rate Set: 38 bmp  Rate Measured: 38 br/min  Minute Volume: 12.2 Liters  Pressure Support: 10 cmH20  FiO2 : (S) 100 %  Peak Inspiratory Pressure: 39 cmH2O  I:E Ratio: 1:1.82  Sensitivity: 5  Flow by: 30  PEEP/CPAP: 12  I Time/ I Time %: 0.33 s  Mean Airway Pressure: 30 cmH20  Plateau Pressure: 30 cmH20  Static Compliance: 16 mL/cmH2O  Dynamic Compliance: 13 mL/cmH2O  Total PEEP: 15 cmH20  Auto PEEP: 1 cmH20  Nitric Oxide/Epoprostenol In Use?: No  NO Set: (S) 0  NO Analyzed: 0 ppm  NO2 Analyzed: 0 ppm  Additional Respiratory  Assessments  Pulse: 86  Resp: (!) 0  SpO2: 94 %  End Tidal CO2: 70 (%)  Position: Semi-Blakely's  Humidification Temp: 37  HME (Heat moisture exchanger): No  Circuit Condensation: Drained  Oral Care Completed?: Yes  Oral Care: Mouthwash, Mouth moisturizer, Mouth suctioned  Subglottic Suction Done?: Yes  Cuff Pressure (cm H2O):  (permissive leak to assist CO2 clearance)    ABGs:   Arterial Blood Gas, POC [[id number]] (Abnormal) Collected: 02/11/18 1043   Updated: 02/11/18 1050     POC pH 7.265 (L)    POC pCO2 71.2 (HH) mm Hg    POC PO2 68.6 (L) mm Hg    POC HCO3 32.3 (H) mmol/L    TCO2 (calc), Art 35 (H) mmol/L    Negative Base Excess, Art NOT REPORTED    Positive Base Excess, Art 4 (H)    POC O2 SAT 90 (L) %    O2 Device/Flow/% HFOV    [employer] transcription, some errors in transcription may have occurred.

## 2018-02-12 NOTE — PLAN OF CARE
Problem: Anxiety/Stress:  Goal: Level of anxiety will decrease  Level of anxiety will decrease   Outcome: Not Met This Shift      Problem: Gas Exchange - Impaired:  Goal: Levels of oxygenation will improve  Levels of oxygenation will improve   Outcome: Ongoing      Problem: Pain:  Goal: Pain level will decrease  Pain level will decrease    Outcome: Ongoing    Goal: Control of acute pain  Control of acute pain   Outcome: Ongoing    Goal: Control of chronic pain  Control of chronic pain   Outcome: Ongoing      Problem: Falls - Risk of  Goal: Absence of falls  Outcome: Ongoing      Problem: Risk for Impaired Skin Integrity  Goal: Tissue integrity - skin and mucous membranes  Structural intactness and normal physiological function of skin and  mucous membranes.    Outcome: Ongoing      Problem: Pain:  Goal: Control of acute pain  Control of acute pain   Outcome: Ongoing    Goal: Pain level will decrease  Pain level will decrease    Outcome: Ongoing      Problem: Nutrition  Goal: Optimal nutrition therapy  Outcome: Ongoing      Problem: ABCDS Injury Assessment  Goal: Absence of physical injury  Outcome: Ongoing      Problem: Neurological  Goal: Maximum potential motor/sensory/cognitive function  Outcome: Ongoing      Problem: OXYGENATION/RESPIRATORY FUNCTION  Goal: Patient will maintain patent airway  Outcome: Ongoing    Goal: Patient will achieve/maintain normal respiratory rate/effort  Respiratory rate and effort will be within normal limits for the patient   Outcome: Ongoing      Problem: MECHANICAL VENTILATION  Goal: Patient will maintain patent airway  Outcome: Ongoing    Goal: Oral health is maintained or improved  Outcome: Ongoing    Goal: ET tube will be managed safely  Outcome: Ongoing    Goal: Ability to express needs and understand communication  Outcome: Not Met This Shift    Goal: Mobility/activity is maintained at optimum level for patient  Outcome: Ongoing      Problem: SKIN INTEGRITY  Goal: Skin integrity is maintained or improved  Outcome: Ongoing      Problem: Infection - Central Venous Catheter-Associated Bloodstream Infection:  Goal: Will show no infection signs and symptoms  Will show no infection signs and symptoms    Outcome: Ongoing      Problem: Skin Integrity:  Goal: Absence of new skin breakdown  Absence of new skin breakdown   Outcome: Ongoing    Goal: Will show no infection signs and symptoms  Will show no infection signs and symptoms    Outcome: Ongoing

## 2018-02-12 NOTE — PROGRESS NOTES
Nutrition Assessment    Type and Reason for Visit: Reassess    Nutrition Recommendations: Suggest increasing TF rate to 40 mL/hr with decreased rate of propofol - will provide 960 kcal and 84 gm protein. Monitor rate of propofol and TF tolerance - modify as needed to appropriate meet needs. Malnutrition Assessment:  · Malnutrition Status: No malnutrition    Nutrition Diagnosis:   · Problem: Inadequate oral intake  · Etiology: related to Impaired respiratory function-inability to consume food     Signs and symptoms:  as evidenced by NPO status due to medical condition, Nutrition support - EN    Nutrition Assessment:  · Subjective Assessment: Pt remains intubated. Propofol continues at 22.7 mL/hr. RN reports pt is tolerating TF well at goal rate with minimal residuals. · Nutrition-Focused Physical Findings: +2 generalized, bilateral LE & UE edema. · Wound Type: Skin Tears  · Current Nutrition Therapies:  · Oral Diet Orders: NPO   · Tube Feeding (TF) Orders:   · Feeding Route: Orogastric  · Formula: Low Calorie, High Protein  · Rate (ml/hr):35 mL/hr    · Volume (ml/day): 840 mL/day  · Duration: Continuous 24hrs  · TF Residuals: Less than or equal to 250ml  · Water Flushes: Per Nursing Protocol  · Current TF & Flush Orders Provides: 840 kcal, 702 mL free water, 74 gm protein  · Goal TF & Flush Orders Provides: At 40 mL/hr = 960 kcal, 84 gm protein  · Additional Calories: Propofol currently at 22.7 mL/hr = 599 kcal per day.   · Anthropometric Measures:  · Ht: 5' (152.4 cm)   · Current Body Wt: 342 lb (155.1 kg)  · Admission Body Wt: 338 lb (153.3 kg)  · Ideal Body Wt: 100 lb (45.4 kg), % [place] Body 338% using admission wt for calculation   · Adjusted Body Wt: 176 lb (79.8 kg), body weight adjusted for Obesity  · BMI Classification: BMI > or equal to 40.0 Obese Class III  · Comparative Standards (Estimated Nutrition Needs):  · Estimated Daily Total Kcal: 5886-4143 kcal  · Estimated Daily Protein (g):

## 2018-02-13 ENCOUNTER — APPOINTMENT (OUTPATIENT)
Dept: GENERAL RADIOLOGY | Age: 43
DRG: 005 | End: 2018-02-13
Payer: MEDICARE

## 2018-02-13 LAB
ALLEN TEST: ABNORMAL
ALLEN TEST: POSITIVE
ANION GAP SERPL CALCULATED.3IONS-SCNC: 8 MMOL/L (ref 9–17)
BUN BLDV-MCNC: 33 MG/DL (ref 6–20)
BUN/CREAT BLD: ABNORMAL (ref 9–20)
CALCIUM SERPL-MCNC: 8.6 MG/DL (ref 8.6–10.4)
CHLORIDE BLD-SCNC: 99 MMOL/L (ref 98–107)
CO2: 32 MMOL/L (ref 20–31)
CREAT SERPL-MCNC: 0.59 MG/DL (ref 0.5–0.9)
EKG ATRIAL RATE: 69 BPM
EKG P AXIS: 27 DEGREES
EKG P-R INTERVAL: 140 MS
EKG Q-T INTERVAL: 426 MS
EKG QRS DURATION: 86 MS
EKG QTC CALCULATION (BAZETT): 456 MS
EKG R AXIS: 24 DEGREES
EKG T AXIS: 32 DEGREES
EKG VENTRICULAR RATE: 69 BPM
FIO2: 50
FIO2: 60
GFR AFRICAN AMERICAN: >60 ML/MIN
GFR NON-AFRICAN AMERICAN: >60 ML/MIN
GFR SERPL CREATININE-BSD FRML MDRD: ABNORMAL ML/MIN/{1.73_M2}
GFR SERPL CREATININE-BSD FRML MDRD: ABNORMAL ML/MIN/{1.73_M2}
GLUCOSE BLD-MCNC: 107 MG/DL (ref 65–105)
GLUCOSE BLD-MCNC: 88 MG/DL (ref 70–99)
HCT VFR BLD CALC: 29.9 % (ref 36.3–47.1)
HEMOGLOBIN: 8.5 G/DL (ref 11.9–15.1)
MCH RBC QN AUTO: 25.6 PG (ref 25.2–33.5)
MCHC RBC AUTO-ENTMCNC: 28.4 G/DL (ref 28.4–34.8)
MCV RBC AUTO: 90.1 FL (ref 82.6–102.9)
MODE: ABNORMAL
MODE: ABNORMAL
NEGATIVE BASE EXCESS, ART: ABNORMAL (ref 0–2)
NEGATIVE BASE EXCESS, ART: ABNORMAL (ref 0–2)
NRBC AUTOMATED: 0 PER 100 WBC
O2 DEVICE/FLOW/%: ABNORMAL
O2 DEVICE/FLOW/%: ABNORMAL
PATIENT TEMP: ABNORMAL
PATIENT TEMP: ABNORMAL
PDW BLD-RTO: 16.2 % (ref 11.8–14.4)
PLATELET # BLD: 187 K/UL (ref 138–453)
PMV BLD AUTO: 11.6 FL (ref 8.1–13.5)
POC HCO3: 33 MMOL/L (ref 21–28)
POC HCO3: 33.5 MMOL/L (ref 21–28)
POC O2 SATURATION: 87 % (ref 94–98)
POC O2 SATURATION: 93 % (ref 94–98)
POC PCO2 TEMP: ABNORMAL MM HG
POC PCO2 TEMP: ABNORMAL MM HG
POC PCO2: 57.9 MM HG (ref 35–48)
POC PCO2: 63.6 MM HG (ref 35–48)
POC PH TEMP: ABNORMAL
POC PH TEMP: ABNORMAL
POC PH: 7.32 (ref 7.35–7.45)
POC PH: 7.37 (ref 7.35–7.45)
POC PO2 TEMP: ABNORMAL MM HG
POC PO2 TEMP: ABNORMAL MM HG
POC PO2: 55.5 MM HG (ref 83–108)
POC PO2: 75.1 MM HG (ref 83–108)
POSITIVE BASE EXCESS, ART: 6 (ref 0–3)
POSITIVE BASE EXCESS, ART: 7 (ref 0–3)
POTASSIUM SERPL-SCNC: 4 MMOL/L (ref 3.7–5.3)
RBC # BLD: 3.32 M/UL (ref 3.95–5.11)
SAMPLE SITE: ABNORMAL
SAMPLE SITE: ABNORMAL
SODIUM BLD-SCNC: 139 MMOL/L (ref 135–144)
TCO2 (CALC), ART: 35 MMOL/L (ref 22–29)
TCO2 (CALC), ART: 35 MMOL/L (ref 22–29)
WBC # BLD: 11.6 K/UL (ref 3.5–11.3)

## 2018-02-13 PROCEDURE — 6360000002 HC RX W HCPCS: Performed by: INTERNAL MEDICINE

## 2018-02-13 PROCEDURE — 80048 BASIC METABOLIC PNL TOTAL CA: CPT

## 2018-02-13 PROCEDURE — 94762 N-INVAS EAR/PLS OXIMTRY CONT: CPT

## 2018-02-13 PROCEDURE — 36600 WITHDRAWAL OF ARTERIAL BLOOD: CPT

## 2018-02-13 PROCEDURE — 2580000003 HC RX 258: Performed by: INTERNAL MEDICINE

## 2018-02-13 PROCEDURE — 94640 AIRWAY INHALATION TREATMENT: CPT

## 2018-02-13 PROCEDURE — 6360000002 HC RX W HCPCS: Performed by: STUDENT IN AN ORGANIZED HEALTH CARE EDUCATION/TRAINING PROGRAM

## 2018-02-13 PROCEDURE — 2000000000 HC ICU R&B

## 2018-02-13 PROCEDURE — 99291 CRITICAL CARE FIRST HOUR: CPT | Performed by: INTERNAL MEDICINE

## 2018-02-13 PROCEDURE — 82803 BLOOD GASES ANY COMBINATION: CPT

## 2018-02-13 PROCEDURE — 6370000000 HC RX 637 (ALT 250 FOR IP): Performed by: HOSPITALIST

## 2018-02-13 PROCEDURE — 2500000003 HC RX 250 WO HCPCS: Performed by: INTERNAL MEDICINE

## 2018-02-13 PROCEDURE — 85027 COMPLETE CBC AUTOMATED: CPT

## 2018-02-13 PROCEDURE — 94002 VENT MGMT INPAT INIT DAY: CPT

## 2018-02-13 PROCEDURE — 2580000003 HC RX 258: Performed by: STUDENT IN AN ORGANIZED HEALTH CARE EDUCATION/TRAINING PROGRAM

## 2018-02-13 PROCEDURE — 82947 ASSAY GLUCOSE BLOOD QUANT: CPT

## 2018-02-13 PROCEDURE — 94003 VENT MGMT INPAT SUBQ DAY: CPT

## 2018-02-13 PROCEDURE — 94770 HC ETCO2 MONITOR DAILY: CPT

## 2018-02-13 PROCEDURE — 36415 COLL VENOUS BLD VENIPUNCTURE: CPT

## 2018-02-13 PROCEDURE — 71045 X-RAY EXAM CHEST 1 VIEW: CPT

## 2018-02-13 PROCEDURE — 6370000000 HC RX 637 (ALT 250 FOR IP): Performed by: INTERNAL MEDICINE

## 2018-02-13 RX ORDER — FUROSEMIDE 10 MG/ML
20 INJECTION INTRAMUSCULAR; INTRAVENOUS ONCE
Status: COMPLETED | OUTPATIENT
Start: 2018-02-13 | End: 2018-02-13

## 2018-02-13 RX ORDER — ATROPINE SULFATE 0.4 MG/ML
0.4 AMPUL (ML) INJECTION PRN
Status: DISCONTINUED | OUTPATIENT
Start: 2018-02-13 | End: 2018-02-13

## 2018-02-13 RX ORDER — ATROPINE SULFATE 0.4 MG/ML
0.5 AMPUL (ML) INJECTION PRN
Status: DISCONTINUED | OUTPATIENT
Start: 2018-02-13 | End: 2018-02-23 | Stop reason: HOSPADM

## 2018-02-13 RX ADMIN — CISATRACURIUM BESYLATE 4.75 MCG/KG/MIN: 10 INJECTION INTRAVENOUS at 01:53

## 2018-02-13 RX ADMIN — CISATRACURIUM BESYLATE 4.75 MCG/KG/MIN: 10 INJECTION INTRAVENOUS at 08:13

## 2018-02-13 RX ADMIN — PROPOFOL 30 MCG/KG/MIN: 10 INJECTION, EMULSION INTRAVENOUS at 01:47

## 2018-02-13 RX ADMIN — Medication 150 MCG/HR: at 19:10

## 2018-02-13 RX ADMIN — CISATRACURIUM BESYLATE 4.75 MCG/KG/MIN: 10 INJECTION INTRAVENOUS at 19:09

## 2018-02-13 RX ADMIN — HEPARIN SODIUM 5000 UNITS: 5000 INJECTION, SOLUTION INTRAVENOUS; SUBCUTANEOUS at 22:17

## 2018-02-13 RX ADMIN — ALBUTEROL SULFATE 2.5 MG: 2.5 SOLUTION RESPIRATORY (INHALATION) at 20:47

## 2018-02-13 RX ADMIN — ALBUTEROL SULFATE 2.5 MG: 2.5 SOLUTION RESPIRATORY (INHALATION) at 23:50

## 2018-02-13 RX ADMIN — FAMOTIDINE 20 MG: 20 TABLET, FILM COATED ORAL at 20:03

## 2018-02-13 RX ADMIN — PROPOFOL 30 MCG/KG/MIN: 10 INJECTION, EMULSION INTRAVENOUS at 17:38

## 2018-02-13 RX ADMIN — Medication 10 ML: at 20:03

## 2018-02-13 RX ADMIN — Medication 200 MCG/HR: at 01:50

## 2018-02-13 RX ADMIN — DESMOPRESSIN ACETATE 40 MG: 0.2 TABLET ORAL at 20:03

## 2018-02-13 RX ADMIN — FUROSEMIDE 20 MG: 10 INJECTION, SOLUTION INTRAMUSCULAR; INTRAVENOUS at 09:14

## 2018-02-13 RX ADMIN — PROPOFOL 30 MCG/KG/MIN: 10 INJECTION, EMULSION INTRAVENOUS at 15:19

## 2018-02-13 RX ADMIN — HEPARIN SODIUM 5000 UNITS: 5000 INJECTION, SOLUTION INTRAVENOUS; SUBCUTANEOUS at 06:42

## 2018-02-13 RX ADMIN — PROPOFOL 30 MCG/KG/MIN: 10 INJECTION, EMULSION INTRAVENOUS at 21:26

## 2018-02-13 RX ADMIN — FAMOTIDINE 20 MG: 20 TABLET, FILM COATED ORAL at 09:29

## 2018-02-13 RX ADMIN — CISATRACURIUM BESYLATE 4.75 MCG/KG/MIN: 10 INJECTION INTRAVENOUS at 13:34

## 2018-02-13 RX ADMIN — PROPOFOL 30 MCG/KG/MIN: 10 INJECTION, EMULSION INTRAVENOUS at 06:46

## 2018-02-13 RX ADMIN — Medication 175 MCG/HR: at 13:12

## 2018-02-13 RX ADMIN — HEPARIN SODIUM 5000 UNITS: 5000 INJECTION, SOLUTION INTRAVENOUS; SUBCUTANEOUS at 15:20

## 2018-02-13 RX ADMIN — CISATRACURIUM BESYLATE 4.75 MCG/KG/MIN: 10 INJECTION INTRAVENOUS at 23:58

## 2018-02-13 RX ADMIN — Medication 175 MCG/HR: at 07:32

## 2018-02-13 RX ADMIN — SODIUM CHLORIDE: 9 INJECTION, SOLUTION INTRAVENOUS at 10:30

## 2018-02-13 ASSESSMENT — PULMONARY FUNCTION TESTS
PIF_VALUE: 37
PIF_VALUE: 42
PIF_VALUE: 40
PIF_VALUE: 35
PIF_VALUE: 39
PIF_VALUE: 37

## 2018-02-13 NOTE — PROGRESS NOTES
INTENSIVE CARE UNIT  Resident Physician Progress Note    Patient - Cristiana Orozco  Date of Admission -  2018  8:09 PM  Date of Evaluation -  2018  Room and Bed Number -  0105/0105-01   Hospital Day - 18  Cc- ARDS    SUBJECTIVE:     OVERNIGHT EVENTS:        Episodes of desaturations to 70's morning during which FIO2 increased to 100 but was at 50% overnight  moderate secretions  Hypertensive episodes  improved with Fentanyl   Good urine output net negative by -1.2 yesterday  2 episodes of bradycardia improved with Atropine x 2      On propofol/nimbex/fentanyl gtt    TODAY:  Afebrile   UO good    AWAKE & FOLLOWING COMMANDS:  [x] No   [] Yes    SECRETIONS Amount:  [] Small [] Moderate  [x] Large  [] None  Color:     [x] White frothy  [] Colored  [] Bloody    SEDATION:  RAAS Score:  [x] Propofol gtt  [] Versed gtt  [] Ativan gtt   [] No Sedation    PARALYZED:  [] No    [x] Yes Nimbex    VASOPRESSORS:  [x] No    [] Yes  [] Levophed [] Dopamine [] Vasopressin  [] Dobutamine [] Phenylephrine [] Epinephrine    Unable to obtain review of systems sedation and paralysis- no change   OBJECTIVE:     VITAL SIGNS:  BP (!) 152/59   Pulse 74   Temp 98.1 °F (36.7 °C) (Axillary)   Resp (!) 2   Ht 5' (1.524 m)   Wt (!) 340 lb (154.2 kg)   SpO2 (!) 86%   BMI 66.40 kg/m²   Tmax over 24 hours:  Temp (24hrs), Av.5 °F (37.5 °C), Min:98.1 °F (36.7 °C), Max:100.4 °F (38 °C)      Patient Vitals for the past 8 hrs:   BP Temp Temp src Pulse Resp SpO2 Height Weight   18 0614 - - - 74 (!) 2 (!) 86 % - -   18 0600 (!) 152/59 - - 76 (!) 0 (!) 86 % - -   18 0500 (!) 177/60 - - 84 19 (!) 83 % - -   18 0430 (!) 117/49 - - 61 (!) 0 92 % - -   18 0403 - - - 61 (!) 0 92 % - -   18 0400 (!) 122/44 98.1 °F (36.7 °C) Axillary 61 (!) 0 92 % 5' (1.524 m) (!) 340 lb (154.2 kg)   18 0330 (!) 119/45 - - 60 (!) 0 93 % - -   18 0300 (!) 115/47 - - 61 (!) 0 94 % - -   18 0230 (!) 115/45 - -

## 2018-02-13 NOTE — PROGRESS NOTES
During pt repositioning on left side, bradycardic episode 30s sustained. 0.5mg Atropine IVP given and HR normalized to 80-90s. No hypotension noted during this event. RN to monitor.     Electronically signed by Uday Freeman RN on 2/13/2018 at 5:08 AM

## 2018-02-13 NOTE — PROGRESS NOTES
[facility] - Progress Note  Today's Date and Time: 2/13/2018, 1:12 PM    Impression :   1. COPD  2. Pulmonary edema   3. Hypertensive emergency  4. Community acquired pneumonia  5. Acute respiratory failure  6. ARDS  7. NSTEMI  8. Rhinovirus infection  9. Respiratory failure- Ventilator       Recommendations:   · Supportive care  · Continue to monitor off antibiotics  · Monitor respiratory secretions    History of Present Illness:   INITIAL HISTORY:    Liam Glass is a 43y.o.-year-old  female who was initially admitted on 1/26/2018. Patient seen at the request of Dr. Donell Walters. Patient w/ hx of COPD on home O2 2.5L initially presented to ED for shortness of breath w/ chest pain. For past week patient has had increased cough and production of yellowish sputum w/ progressively increasing SOB along w/ feelings of fever. In ED she was found to have O2 saturation in 30s and 40s, hypertension of 187/81 and a cxr showing potential multifocal pneumonia and pulmonary edema w/ leukocytosis and increased troponins. She was given aspirin and Plavix in ED and admitted.       On admission she was placed on ceftriaxone and azithromycin for potential pneumonia and given solumedrol q8, lasix BID, and cardene for hypertension and pulmonary edema. We were consulted because the influenza PCR results are pending and patient is unable to take tamiflu PO. Inpatient team is requesting input regarding starting peramivir    CURRENT EVALUATION: 2/13/2018  Overnight patient decompensated and O2 sat dropped into the 70% range and needed to be bagged. Pt also became bradycardic and needed two doses of Atropine before stabilizing. Currently patient is on 90% FiO2 and a life pack. Afebrile. VSS, except for HPTN    On proprofol and nimbex.   On oscillator for ventilatory support, FiO2 90-->50     WBC 11.6  BUN improving, Cr Stable  Hb Stable  Chest x-ray: 2-12-18 showed cardiomegaly,

## 2018-02-13 NOTE — PLAN OF CARE
Problem: Anxiety/Stress:  Goal: Level of anxiety will decrease  Level of anxiety will decrease   Outcome: Not Met This Shift      Problem: Gas Exchange - Impaired:  Goal: Levels of oxygenation will improve  Levels of oxygenation will improve   Outcome: Ongoing      Problem: Pain:  Goal: Pain level will decrease  Pain level will decrease    Outcome: Ongoing    Goal: Control of acute pain  Control of acute pain   Outcome: Ongoing    Goal: Control of chronic pain  Control of chronic pain   Outcome: Ongoing      Problem: Falls - Risk of  Goal: Absence of falls  Outcome: Met This Shift      Problem: Risk for Impaired Skin Integrity  Goal: Tissue integrity - skin and mucous membranes  Structural intactness and normal physiological function of skin and  mucous membranes.    Outcome: Ongoing      Problem: Pain:  Goal: Control of acute pain  Control of acute pain   Outcome: Ongoing    Goal: Pain level will decrease  Pain level will decrease    Outcome: Ongoing      Problem: Nutrition  Goal: Optimal nutrition therapy  Outcome: Ongoing      Problem: ABCDS Injury Assessment  Goal: Absence of physical injury  Outcome: Met This Shift      Problem: Neurological  Goal: Maximum potential motor/sensory/cognitive function  Outcome: Not Met This Shift      Problem: OXYGENATION/RESPIRATORY FUNCTION  Goal: Patient will maintain patent airway  Outcome: Ongoing    Goal: Patient will achieve/maintain normal respiratory rate/effort  Respiratory rate and effort will be within normal limits for the patient   Outcome: Ongoing      Problem: MECHANICAL VENTILATION  Goal: Patient will maintain patent airway  Outcome: Ongoing    Goal: Ability to express needs and understand communication  Outcome: Not Met This Shift    Goal: Mobility/activity is maintained at optimum level for patient  Outcome: Ongoing      Problem: SKIN INTEGRITY  Goal: Skin integrity is maintained or improved  Outcome: Ongoing      Problem: Infection - Central Venous

## 2018-02-14 LAB
ALLEN TEST: NEGATIVE
ANION GAP SERPL CALCULATED.3IONS-SCNC: 12 MMOL/L (ref 9–17)
BUN BLDV-MCNC: 29 MG/DL (ref 6–20)
BUN/CREAT BLD: ABNORMAL (ref 9–20)
CALCIUM SERPL-MCNC: 8.5 MG/DL (ref 8.6–10.4)
CHLORIDE BLD-SCNC: 101 MMOL/L (ref 98–107)
CO2: 28 MMOL/L (ref 20–31)
CREAT SERPL-MCNC: 0.52 MG/DL (ref 0.5–0.9)
FIO2: 60
GFR AFRICAN AMERICAN: >60 ML/MIN
GFR NON-AFRICAN AMERICAN: >60 ML/MIN
GFR SERPL CREATININE-BSD FRML MDRD: ABNORMAL ML/MIN/{1.73_M2}
GFR SERPL CREATININE-BSD FRML MDRD: ABNORMAL ML/MIN/{1.73_M2}
GLUCOSE BLD-MCNC: 102 MG/DL (ref 74–100)
GLUCOSE BLD-MCNC: 95 MG/DL (ref 70–99)
HCT VFR BLD CALC: 33.2 % (ref 36.3–47.1)
HEMOGLOBIN: 9.3 G/DL (ref 11.9–15.1)
MCH RBC QN AUTO: 26.1 PG (ref 25.2–33.5)
MCHC RBC AUTO-ENTMCNC: 28 G/DL (ref 28.4–34.8)
MCV RBC AUTO: 93 FL (ref 82.6–102.9)
MODE: ABNORMAL
NEGATIVE BASE EXCESS, ART: ABNORMAL (ref 0–2)
NRBC AUTOMATED: 0 PER 100 WBC
O2 DEVICE/FLOW/%: ABNORMAL
PATIENT TEMP: ABNORMAL
PDW BLD-RTO: 16.1 % (ref 11.8–14.4)
PLATELET # BLD: 205 K/UL (ref 138–453)
PMV BLD AUTO: 10.9 FL (ref 8.1–13.5)
POC HCO3: 32.9 MMOL/L (ref 21–28)
POC O2 SATURATION: 88 % (ref 94–98)
POC PCO2 TEMP: ABNORMAL MM HG
POC PCO2: 58.2 MM HG (ref 35–48)
POC PH TEMP: ABNORMAL
POC PH: 7.36 (ref 7.35–7.45)
POC PO2 TEMP: ABNORMAL MM HG
POC PO2: 57.8 MM HG (ref 83–108)
POSITIVE BASE EXCESS, ART: 6 (ref 0–3)
POTASSIUM SERPL-SCNC: 4.1 MMOL/L (ref 3.7–5.3)
RBC # BLD: 3.57 M/UL (ref 3.95–5.11)
SAMPLE SITE: ABNORMAL
SODIUM BLD-SCNC: 141 MMOL/L (ref 135–144)
TCO2 (CALC), ART: 35 MMOL/L (ref 22–29)
WBC # BLD: 8.8 K/UL (ref 3.5–11.3)

## 2018-02-14 PROCEDURE — 6360000002 HC RX W HCPCS: Performed by: STUDENT IN AN ORGANIZED HEALTH CARE EDUCATION/TRAINING PROGRAM

## 2018-02-14 PROCEDURE — 85027 COMPLETE CBC AUTOMATED: CPT

## 2018-02-14 PROCEDURE — 2580000003 HC RX 258: Performed by: STUDENT IN AN ORGANIZED HEALTH CARE EDUCATION/TRAINING PROGRAM

## 2018-02-14 PROCEDURE — 2580000003 HC RX 258: Performed by: INTERNAL MEDICINE

## 2018-02-14 PROCEDURE — 6370000000 HC RX 637 (ALT 250 FOR IP): Performed by: HOSPITALIST

## 2018-02-14 PROCEDURE — 82803 BLOOD GASES ANY COMBINATION: CPT

## 2018-02-14 PROCEDURE — 99233 SBSQ HOSP IP/OBS HIGH 50: CPT | Performed by: INTERNAL MEDICINE

## 2018-02-14 PROCEDURE — 82947 ASSAY GLUCOSE BLOOD QUANT: CPT

## 2018-02-14 PROCEDURE — 94003 VENT MGMT INPAT SUBQ DAY: CPT

## 2018-02-14 PROCEDURE — 94762 N-INVAS EAR/PLS OXIMTRY CONT: CPT

## 2018-02-14 PROCEDURE — 6370000000 HC RX 637 (ALT 250 FOR IP): Performed by: INTERNAL MEDICINE

## 2018-02-14 PROCEDURE — 36415 COLL VENOUS BLD VENIPUNCTURE: CPT

## 2018-02-14 PROCEDURE — 94640 AIRWAY INHALATION TREATMENT: CPT

## 2018-02-14 PROCEDURE — 94770 HC ETCO2 MONITOR DAILY: CPT

## 2018-02-14 PROCEDURE — 36600 WITHDRAWAL OF ARTERIAL BLOOD: CPT

## 2018-02-14 PROCEDURE — 6360000002 HC RX W HCPCS: Performed by: INTERNAL MEDICINE

## 2018-02-14 PROCEDURE — 97110 THERAPEUTIC EXERCISES: CPT

## 2018-02-14 PROCEDURE — 2000000000 HC ICU R&B

## 2018-02-14 PROCEDURE — 2500000003 HC RX 250 WO HCPCS: Performed by: INTERNAL MEDICINE

## 2018-02-14 PROCEDURE — 99291 CRITICAL CARE FIRST HOUR: CPT | Performed by: INTERNAL MEDICINE

## 2018-02-14 PROCEDURE — 80048 BASIC METABOLIC PNL TOTAL CA: CPT

## 2018-02-14 PROCEDURE — 51798 US URINE CAPACITY MEASURE: CPT

## 2018-02-14 PROCEDURE — 99211 OFF/OP EST MAY X REQ PHY/QHP: CPT

## 2018-02-14 RX ORDER — IPRATROPIUM BROMIDE AND ALBUTEROL SULFATE 2.5; .5 MG/3ML; MG/3ML
1 SOLUTION RESPIRATORY (INHALATION) 4 TIMES DAILY
Status: DISCONTINUED | OUTPATIENT
Start: 2018-02-14 | End: 2018-02-23 | Stop reason: HOSPADM

## 2018-02-14 RX ORDER — ALBUTEROL SULFATE 2.5 MG/3ML
2.5 SOLUTION RESPIRATORY (INHALATION) 2 TIMES DAILY
Status: DISCONTINUED | OUTPATIENT
Start: 2018-02-14 | End: 2018-02-23 | Stop reason: HOSPADM

## 2018-02-14 RX ORDER — FUROSEMIDE 10 MG/ML
20 INJECTION INTRAMUSCULAR; INTRAVENOUS ONCE
Status: COMPLETED | OUTPATIENT
Start: 2018-02-14 | End: 2018-02-14

## 2018-02-14 RX ADMIN — PROPOFOL 32 MCG/KG/MIN: 10 INJECTION, EMULSION INTRAVENOUS at 23:09

## 2018-02-14 RX ADMIN — HEPARIN SODIUM 5000 UNITS: 5000 INJECTION, SOLUTION INTRAVENOUS; SUBCUTANEOUS at 06:18

## 2018-02-14 RX ADMIN — ALBUTEROL SULFATE 2.5 MG: 2.5 SOLUTION RESPIRATORY (INHALATION) at 03:26

## 2018-02-14 RX ADMIN — HEPARIN SODIUM 5000 UNITS: 5000 INJECTION, SOLUTION INTRAVENOUS; SUBCUTANEOUS at 13:41

## 2018-02-14 RX ADMIN — FAMOTIDINE 20 MG: 20 TABLET, FILM COATED ORAL at 19:53

## 2018-02-14 RX ADMIN — IPRATROPIUM BROMIDE AND ALBUTEROL SULFATE 1 AMPULE: .5; 3 SOLUTION RESPIRATORY (INHALATION) at 19:53

## 2018-02-14 RX ADMIN — FUROSEMIDE 20 MG: 10 INJECTION, SOLUTION INTRAVENOUS at 09:14

## 2018-02-14 RX ADMIN — PROPOFOL 30 MCG/KG/MIN: 10 INJECTION, EMULSION INTRAVENOUS at 01:19

## 2018-02-14 RX ADMIN — Medication 10 ML: at 09:04

## 2018-02-14 RX ADMIN — IPRATROPIUM BROMIDE AND ALBUTEROL SULFATE 1 AMPULE: .5; 3 SOLUTION RESPIRATORY (INHALATION) at 12:17

## 2018-02-14 RX ADMIN — PROPOFOL 30 MCG/KG/MIN: 10 INJECTION, EMULSION INTRAVENOUS at 09:04

## 2018-02-14 RX ADMIN — PROPOFOL 15 MCG/KG/MIN: 10 INJECTION, EMULSION INTRAVENOUS at 13:44

## 2018-02-14 RX ADMIN — HEPARIN SODIUM 5000 UNITS: 5000 INJECTION, SOLUTION INTRAVENOUS; SUBCUTANEOUS at 21:59

## 2018-02-14 RX ADMIN — FAMOTIDINE 20 MG: 20 TABLET, FILM COATED ORAL at 09:04

## 2018-02-14 RX ADMIN — Medication 125 MCG/HR: at 21:56

## 2018-02-14 RX ADMIN — IPRATROPIUM BROMIDE AND ALBUTEROL SULFATE 1 AMPULE: .5; 3 SOLUTION RESPIRATORY (INHALATION) at 17:11

## 2018-02-14 RX ADMIN — CISATRACURIUM BESYLATE 4.75 MCG/KG/MIN: 10 INJECTION INTRAVENOUS at 05:33

## 2018-02-14 RX ADMIN — HYDRALAZINE HYDROCHLORIDE 10 MG: 20 INJECTION INTRAMUSCULAR; INTRAVENOUS at 23:20

## 2018-02-14 RX ADMIN — IPRATROPIUM BROMIDE AND ALBUTEROL SULFATE 1 AMPULE: .5; 3 SOLUTION RESPIRATORY (INHALATION) at 09:09

## 2018-02-14 RX ADMIN — Medication 10 ML: at 19:50

## 2018-02-14 RX ADMIN — Medication 150 MCG/HR: at 01:51

## 2018-02-14 RX ADMIN — Medication 125 MCG/HR: at 14:32

## 2018-02-14 RX ADMIN — ALBUTEROL SULFATE 2.5 MG: 2.5 SOLUTION RESPIRATORY (INHALATION) at 23:03

## 2018-02-14 RX ADMIN — PROPOFOL 35 MCG/KG/MIN: 10 INJECTION, EMULSION INTRAVENOUS at 19:15

## 2018-02-14 RX ADMIN — PROPOFOL 30 MCG/KG/MIN: 10 INJECTION, EMULSION INTRAVENOUS at 04:43

## 2018-02-14 RX ADMIN — Medication 150 MCG/HR: at 08:27

## 2018-02-14 RX ADMIN — HYDRALAZINE HYDROCHLORIDE 10 MG: 20 INJECTION INTRAMUSCULAR; INTRAVENOUS at 18:00

## 2018-02-14 RX ADMIN — DESMOPRESSIN ACETATE 40 MG: 0.2 TABLET ORAL at 19:53

## 2018-02-14 ASSESSMENT — PULMONARY FUNCTION TESTS
PIF_VALUE: 35
PIF_VALUE: 35
PIF_VALUE: 27
PIF_VALUE: 31
PIF_VALUE: 34
PIF_VALUE: 36
PIF_VALUE: 29
PIF_VALUE: 32
PIF_VALUE: 36
PIF_VALUE: 29

## 2018-02-14 NOTE — CONSULTS
Fluticasone-Salmeterol (ADVAIR DISKUS IN) Inhale into the lungs    Historical Provider, MD   fluticasone (FLONASE) 50 MCG/ACT nasal spray 1 spray by Nasal route daily    Historical Provider, MD   nicotine (NICOTINE STEP 2) 14 MG/24HR Place 1 patch onto the skin every 24 hours    Historical Provider, MD    Scheduled Meds:   ipratropium-albuterol  1 ampule Inhalation 4x daily    albuterol  2.5 mg Nebulization BID    nicotine  1 patch Transdermal Daily    heparin (porcine)  5,000 Units Subcutaneous 3 times per day    sodium chloride flush  10 mL Intravenous 2 times per day    famotidine  20 mg Oral BID    sodium chloride flush  10 mL Intravenous 2 times per day    atorvastatin  40 mg Oral Nightly     Continuous Infusions:   sodium chloride 10 mL/hr at 02/13/18 1030    fentaNYL 125 mcg/hr (02/14/18 1432)    cisatracurium (NIMBEX) infusion Stopped (02/14/18 0902)    dextrose      propofol 15 mcg/kg/min (02/14/18 1344)     PRN Meds:.atropine, hydrALAZINE, polyvinyl alcohol, cloNIDine, naloxone, sodium chloride flush, glucose, dextrose, glucagon (rDNA), dextrose, lidocaine viscous, magnesium hydroxide, ondansetron, potassium chloride **OR** potassium chloride **OR** potassium chloride, sodium chloride flush, acetaminophen  Allergies  is allergic to asa [aspirin] and sulfa antibiotics. Family History  family history is not on file. Social History   reports that she has been smoking. She has been smoking about 0.50 packs per day. She has never used smokeless tobacco.   has no alcohol history on file. reports that she does not use drugs. Review of Systems  Unable to perform d/t sedation & mechanical ventilation. PHYSICAL:   VITALS:  height is 5' (1.524 m) and weight is 339 lb 11.2 oz (154.1 kg) (abnormal). Her oral temperature is 97.7 °F (36.5 °C). Her blood pressure is 182/49 (abnormal) and her pulse is 72. Her respiration is 25 and oxygen saturation is 91%. CONSTITUTIONAL: Sedated, on vent.  no medical support devices. 2. Similar findings compatible with pulmonary edema/ARDS, atelectasis and/or   nonspecific pneumonitis. Continued imaging follow-up is recommended. XR CHEST PORTABLE   Final Result   Increased bilateral airspace disease compatible worsening pulmonary edema or   or ARDS. Stable cardiomegaly. XR CHEST PORTABLE   Final Result   Fairly stable diffuse patchy airspace and interstitial opacities. XR CHEST PORTABLE   Final Result   Interval worsening of the patient's ARDS findings. XR CHEST PORTABLE   Final Result   Moderate edema are CHF slightly improved. XR CHEST PORTABLE   Final Result   1. No significant change in bilateral airspace disease. 2.  Unchanged appearance of support tubes and lines. XR CHEST PORTABLE   Final Result   No significant change in chest findings. ARDS findings persists. XR CHEST PORTABLE   Final Result   No significant interval change in lung appearance. Continued significant   pulmonary opacities and appearance of cardiomegaly. XR CHEST PORTABLE   Final Result   Limited study due to motion. No obvious acute changes. If there is an acute   clinical change, consider repeat imaging. XR CHEST PORTABLE   Final Result   Stable exam         XR CHEST PORTABLE   Final Result   1. Support devices appear grossly unchanged in position. 2. Enlarged cardiac silhouette with patchy opacification of the lungs   bilaterally. XR CHEST PORTABLE   Final Result   Diffuse bilateral lung opacities similar to the previous study. XR CHEST PORTABLE   Final Result   1. Endotracheal tube terminating 3 cm above the lore. 2. Re-demonstration of diffuse bilateral airspace opacities suggesting edema,   a multilobar pneumonia or ARDS. 3. Enteric tube present. The distal tip of the tube is however not evident. Consider dedicated portable abdomen radiograph for further evaluation.          XR

## 2018-02-14 NOTE — PROGRESS NOTES
Impression   1. Given limitations, similar, expected positions of medical support devices. 2. Similar findings compatible with pulmonary edema/ARDS, atelectasis and/or   nonspecific pneumonitis.  Continued imaging follow-up is recommended. 2/10/2018 8:51 am       COMPARISON:   8 February 2018       HISTORY:   ORDERING SYSTEM PROVIDED HISTORY: follow infiltrates   TECHNOLOGIST PROVIDED HISTORY:   Reason for exam:->follow infiltrates       FINDINGS:   AP portable view of the chest time stamped at 821 hours demonstrates   overlying cardiac monitoring electrodes.  Endotracheal tube terminates 4.6 cm   above the lore.  The patient has cardiomegaly and pulmonary vascular   congestion with perihilar opacities consistent with pulmonary edema.  Lower   lung zones are obscured by overlying soft tissue.  Bilateral effusions are   evident.  Bibasilar opacities are noted, likely edema or atelectasis.  No   extrapleural air is noted.           Impression   Re- demonstration of cardiomegaly, vascular congestion and perihilar edema   with bibasilar opacities, likely edema.  There is little change compared to   prior study when allowances are made for differences in technique and   positioning. IMAGING  Chest X-ray from 2-11-18:  Suboptimal examination.  Cardiomegaly, pulmonary vascular congestion, and   bilateral lower lobe airspace opacities remain essentially unchanged     1/26/18 cxr: worsening diffuse b/l airspace opacities suggestive of multilobar pneumonia or pulmonary edema     1/26/18 echo: hyperdynamic left ventricle, otherwise normal    1/30/18 cxr: patchy opacification bialterally   2-7-18: CXR diffuse bilateral involvement. ARDS. No major change. 2/8/18:   Limited evaluation secondary to portable technique; however, cardiomegaly and   pulmonary edema do not appear significantly changed.  Endotracheal tube   positioning similar.      EXAMINATION:   SINGLE VIEW OF THE CHEST       2/13/2018 8:34 am

## 2018-02-14 NOTE — PLAN OF CARE
Problem: Anxiety/Stress:  Goal: Level of anxiety will decrease  Level of anxiety will decrease   Outcome: Ongoing  Pt. Given a lot of reassurance     Problem: Gas Exchange - Impaired:  Goal: Levels of oxygenation will improve  Levels of oxygenation will improve   Outcome: Ongoing  Down to 50% on vent    Problem: Pain:  Goal: Pain level will decrease  Pain level will decrease    Outcome: Ongoing    Goal: Recognizes and communicates pain  Recognizes and communicates pain   Outcome: Not Met This Shift    Goal: Control of acute pain  Control of acute pain   Outcome: Met This Shift    Goal: Control of chronic pain  Control of chronic pain   Outcome: Met This Shift      Problem: Falls - Risk of  Goal: Absence of falls  Outcome: Met This Shift      Problem: Risk for Impaired Skin Integrity  Goal: Tissue integrity - skin and mucous membranes  Structural intactness and normal physiological function of skin and  mucous membranes. Outcome: Met This Shift      Problem: Pain:  Goal: Control of acute pain  Control of acute pain   Outcome: Met This Shift    Goal: Control of chronic pain  Control of chronic pain   Outcome: Met This Shift    Goal: Pain level will decrease  Pain level will decrease    Outcome: Ongoing      Problem: Nutrition  Goal: Optimal nutrition therapy  Outcome: Met This Shift      Problem: ABCDS Injury Assessment  Goal: Absence of physical injury  Outcome: Met This Shift      Problem: Neurological  Goal: Maximum potential motor/sensory/cognitive function  Outcome: Ongoing  Pt.  Did follow commands for the first time    Problem: OXYGENATION/RESPIRATORY FUNCTION  Goal: Patient will achieve/maintain normal respiratory rate/effort  Respiratory rate and effort will be within normal limits for the patient   Outcome: Met This Shift      Problem: MECHANICAL VENTILATION  Goal: Patient will maintain patent airway  Outcome: Met This Shift    Goal: Oral health is maintained or improved  Outcome: Met This Shift    Goal: ET tube will be managed safely  Outcome: Met This Shift    Goal: Ability to express needs and understand communication  Outcome: Ongoing      Problem: Infection - Central Venous Catheter-Associated Bloodstream Infection:  Goal: Will show no infection signs and symptoms  Will show no infection signs and symptoms    Outcome: Met This Shift      Problem: Skin Integrity:  Goal: Absence of new skin breakdown  Absence of new skin breakdown   Outcome: Met This Shift    Goal: Will show no infection signs and symptoms  Will show no infection signs and symptoms    Outcome: Met This Shift

## 2018-02-14 NOTE — PROGRESS NOTES
positive add Lasix 20 mg once today. Nimbex discontinued today and patient awake and following commands. Continue sedation with Propofol    Bradycardia. Atropine 0.5 mg prn for HR<40    Hypertension is controlled by titration Deprivan and Fentanyl. Off antihypertensives. Levophed prn in case of hypotensive episodes. Avoid B blockers as they cause asystole and bradycardia. Troponemia stable. Cardiology on board. Ischemic workup once current condition improves. Ely Fitzgerald MD PGY 2  Internal Medicine Resident    [facility]   2/14/2018 at 7:19 AM  Attending Physician Statement  I have discussed the care of Kimmy Willis, including pertinent history and exam findings,  with the resident. I have seen and examined the patient and the key elements of all parts of the encounter have been performed by me. I agree with the assessment, plan and orders as documented by the resident with additions . Patient's ARDS is likely in proliferative stage   She continues to have poor compliance. Has tolerated transition to [facility], [facility] well  Keep her oxygen saturation 92% and higher and wean down the FiO2 to 45% and then PEEP down to 12. Maintain oxygen delivery by maintaining cardiac output, hemoglobin greater than 8, saturation greater than 92%  She will need trach and PEG  Discontinue paralytics completely minimize narcotic and sedative   Continue to feeds. Physical therapy and intermittent Lasix    Total critical care time caring for this patient with life threatening, unstable organ failure, including direct patient contact, management of life support systems, review of data including imaging and labs, discussions with other team members and physicians at least 28   Min so far today, excluding procedures. Treatment plan Discussed with nursing staff in detail , all questions answered .    Tylor Hussein MD   2/14/2018   2:43 PM    Please note that this chart was generated using voice

## 2018-02-14 NOTE — PLAN OF CARE
Problem: RESPIRATORY  Intervention: Respiratory assessment  BRONCHOSPASM/BRONCHOCONSTRICTION     [x]         IMPROVE AERATION/BREATH SOUNDS  [x]   ADMINISTER BRONCHODILATOR THERAPY AS APPROPRIATE  [x]   ASSESS BREATH SOUNDS  [x]   IMPLEMENT AEROSOL/MDI PROTOCOL  [x]   PATIENT EDUCATION AS NEEDED    Problem: OXYGENATION/RESPIRATORY FUNCTION  Goal: Patient will maintain patent airway  Outcome: Ongoing  Goal: Patient will achieve/maintain normal respiratory rate/effort  Respiratory rate and effort will be within normal limits for the patient  Outcome: Ongoing    Problem: MECHANICAL VENTILATION  Goal: Patient will maintain patent airway  Outcome: Ongoing  Goal: Oral health is maintained or improved  Outcome: Ongoing  Goal: ET tube will be managed safely  Outcome: Ongoing  Goal: Ability to express needs and understand communication  Outcome: Ongoing  Goal: Mobility/activity is maintained at optimum level for patient  Outcome: Ongoing    Problem: ASPIRATION PRECAUTIONS  Goal: Patients risk of aspiration is minimized  Outcome: Ongoing    Problem: SKIN INTEGRITY  Goal: Skin integrity is maintained or improved  Outcome: Ongoing

## 2018-02-15 LAB
ACTION: NORMAL
ALLEN TEST: ABNORMAL
ANION GAP SERPL CALCULATED.3IONS-SCNC: 12 MMOL/L (ref 9–17)
BUN BLDV-MCNC: 25 MG/DL (ref 6–20)
BUN/CREAT BLD: ABNORMAL (ref 9–20)
CALCIUM SERPL-MCNC: 8.4 MG/DL (ref 8.6–10.4)
CHLORIDE BLD-SCNC: 99 MMOL/L (ref 98–107)
CO2: 29 MMOL/L (ref 20–31)
CREAT SERPL-MCNC: 0.52 MG/DL (ref 0.5–0.9)
DATE AND TIME: NORMAL
FIO2: 45
GFR AFRICAN AMERICAN: >60 ML/MIN
GFR NON-AFRICAN AMERICAN: >60 ML/MIN
GFR SERPL CREATININE-BSD FRML MDRD: ABNORMAL ML/MIN/{1.73_M2}
GFR SERPL CREATININE-BSD FRML MDRD: ABNORMAL ML/MIN/{1.73_M2}
GLUCOSE BLD-MCNC: 127 MG/DL (ref 70–99)
HCT VFR BLD CALC: 32.3 % (ref 36.3–47.1)
HEMOGLOBIN: 9 G/DL (ref 11.9–15.1)
MCH RBC QN AUTO: 25.6 PG (ref 25.2–33.5)
MCHC RBC AUTO-ENTMCNC: 27.9 G/DL (ref 28.4–34.8)
MCV RBC AUTO: 92 FL (ref 82.6–102.9)
MODE: ABNORMAL
NEGATIVE BASE EXCESS, ART: ABNORMAL (ref 0–2)
NOTIFY: NORMAL
NRBC AUTOMATED: 0 PER 100 WBC
O2 DEVICE/FLOW/%: ABNORMAL
PATIENT TEMP: ABNORMAL
PDW BLD-RTO: 16.5 % (ref 11.8–14.4)
PLATELET # BLD: 218 K/UL (ref 138–453)
PMV BLD AUTO: 11 FL (ref 8.1–13.5)
POC HCO3: 35.1 MMOL/L (ref 21–28)
POC O2 SATURATION: 83 % (ref 94–98)
POC PCO2 TEMP: ABNORMAL MM HG
POC PCO2: 55.5 MM HG (ref 35–48)
POC PH TEMP: ABNORMAL
POC PH: 7.41 (ref 7.35–7.45)
POC PO2 TEMP: ABNORMAL MM HG
POC PO2: 48.3 MM HG (ref 83–108)
POSITIVE BASE EXCESS, ART: 9 (ref 0–3)
POTASSIUM SERPL-SCNC: 4.3 MMOL/L (ref 3.7–5.3)
RBC # BLD: 3.51 M/UL (ref 3.95–5.11)
READ BACK: YES
SAMPLE SITE: ABNORMAL
SODIUM BLD-SCNC: 140 MMOL/L (ref 135–144)
TCO2 (CALC), ART: 37 MMOL/L (ref 22–29)
TRIGL SERPL-MCNC: 102 MG/DL
WBC # BLD: 10.8 K/UL (ref 3.5–11.3)

## 2018-02-15 PROCEDURE — 2580000003 HC RX 258: Performed by: STUDENT IN AN ORGANIZED HEALTH CARE EDUCATION/TRAINING PROGRAM

## 2018-02-15 PROCEDURE — 82803 BLOOD GASES ANY COMBINATION: CPT

## 2018-02-15 PROCEDURE — 51702 INSERT TEMP BLADDER CATH: CPT

## 2018-02-15 PROCEDURE — 94003 VENT MGMT INPAT SUBQ DAY: CPT

## 2018-02-15 PROCEDURE — 2580000003 HC RX 258: Performed by: INTERNAL MEDICINE

## 2018-02-15 PROCEDURE — 2000000000 HC ICU R&B

## 2018-02-15 PROCEDURE — 94770 HC ETCO2 MONITOR DAILY: CPT

## 2018-02-15 PROCEDURE — 80048 BASIC METABOLIC PNL TOTAL CA: CPT

## 2018-02-15 PROCEDURE — 36415 COLL VENOUS BLD VENIPUNCTURE: CPT

## 2018-02-15 PROCEDURE — 6370000000 HC RX 637 (ALT 250 FOR IP): Performed by: HOSPITALIST

## 2018-02-15 PROCEDURE — 6360000002 HC RX W HCPCS: Performed by: INTERNAL MEDICINE

## 2018-02-15 PROCEDURE — 6360000002 HC RX W HCPCS: Performed by: STUDENT IN AN ORGANIZED HEALTH CARE EDUCATION/TRAINING PROGRAM

## 2018-02-15 PROCEDURE — 36600 WITHDRAWAL OF ARTERIAL BLOOD: CPT

## 2018-02-15 PROCEDURE — 6370000000 HC RX 637 (ALT 250 FOR IP): Performed by: STUDENT IN AN ORGANIZED HEALTH CARE EDUCATION/TRAINING PROGRAM

## 2018-02-15 PROCEDURE — 99291 CRITICAL CARE FIRST HOUR: CPT | Performed by: INTERNAL MEDICINE

## 2018-02-15 PROCEDURE — 6370000000 HC RX 637 (ALT 250 FOR IP): Performed by: INTERNAL MEDICINE

## 2018-02-15 PROCEDURE — 94762 N-INVAS EAR/PLS OXIMTRY CONT: CPT

## 2018-02-15 PROCEDURE — 99233 SBSQ HOSP IP/OBS HIGH 50: CPT | Performed by: INTERNAL MEDICINE

## 2018-02-15 PROCEDURE — 6370000000 HC RX 637 (ALT 250 FOR IP): Performed by: EMERGENCY MEDICINE

## 2018-02-15 PROCEDURE — 94640 AIRWAY INHALATION TREATMENT: CPT

## 2018-02-15 PROCEDURE — 2500000003 HC RX 250 WO HCPCS: Performed by: INTERNAL MEDICINE

## 2018-02-15 PROCEDURE — 84478 ASSAY OF TRIGLYCERIDES: CPT

## 2018-02-15 PROCEDURE — 85027 COMPLETE CBC AUTOMATED: CPT

## 2018-02-15 RX ORDER — HYDRALAZINE HYDROCHLORIDE 50 MG/1
50 TABLET, FILM COATED ORAL EVERY 8 HOURS SCHEDULED
Status: DISCONTINUED | OUTPATIENT
Start: 2018-02-15 | End: 2018-02-19

## 2018-02-15 RX ORDER — AMLODIPINE BESYLATE 10 MG/1
10 TABLET ORAL DAILY
Status: DISCONTINUED | OUTPATIENT
Start: 2018-02-15 | End: 2018-02-15

## 2018-02-15 RX ORDER — LISINOPRIL 10 MG/1
10 TABLET ORAL DAILY
Status: DISCONTINUED | OUTPATIENT
Start: 2018-02-15 | End: 2018-02-19

## 2018-02-15 RX ADMIN — ALBUTEROL SULFATE 2.5 MG: 2.5 SOLUTION RESPIRATORY (INHALATION) at 23:26

## 2018-02-15 RX ADMIN — POLYVINYL ALCOHOL 1 DROP: 14 SOLUTION/ DROPS OPHTHALMIC at 23:20

## 2018-02-15 RX ADMIN — FAMOTIDINE 20 MG: 20 TABLET, FILM COATED ORAL at 20:05

## 2018-02-15 RX ADMIN — Medication 125 MCG/HR: at 05:52

## 2018-02-15 RX ADMIN — PROPOFOL 32 MCG/KG/MIN: 10 INJECTION, EMULSION INTRAVENOUS at 05:39

## 2018-02-15 RX ADMIN — POLYVINYL ALCOHOL 1 DROP: 14 SOLUTION/ DROPS OPHTHALMIC at 17:53

## 2018-02-15 RX ADMIN — DESMOPRESSIN ACETATE 40 MG: 0.2 TABLET ORAL at 20:05

## 2018-02-15 RX ADMIN — HEPARIN SODIUM 5000 UNITS: 5000 INJECTION, SOLUTION INTRAVENOUS; SUBCUTANEOUS at 14:50

## 2018-02-15 RX ADMIN — PROPOFOL 32 MCG/KG/MIN: 10 INJECTION, EMULSION INTRAVENOUS at 02:01

## 2018-02-15 RX ADMIN — FAMOTIDINE 20 MG: 20 TABLET, FILM COATED ORAL at 10:05

## 2018-02-15 RX ADMIN — HYDRALAZINE HYDROCHLORIDE 50 MG: 50 TABLET, FILM COATED ORAL at 21:50

## 2018-02-15 RX ADMIN — Medication 150 MCG/HR: at 18:47

## 2018-02-15 RX ADMIN — Medication 150 MCG/HR: at 12:15

## 2018-02-15 RX ADMIN — SODIUM CHLORIDE 5 MG/HR: 0.9 INJECTION, SOLUTION INTRAVENOUS at 09:57

## 2018-02-15 RX ADMIN — POLYVINYL ALCOHOL 1 DROP: 14 SOLUTION/ DROPS OPHTHALMIC at 15:41

## 2018-02-15 RX ADMIN — PROPOFOL 20 MCG/KG/MIN: 10 INJECTION, EMULSION INTRAVENOUS at 17:54

## 2018-02-15 RX ADMIN — HYDRALAZINE HYDROCHLORIDE 50 MG: 50 TABLET, FILM COATED ORAL at 14:50

## 2018-02-15 RX ADMIN — HEPARIN SODIUM 5000 UNITS: 5000 INJECTION, SOLUTION INTRAVENOUS; SUBCUTANEOUS at 06:11

## 2018-02-15 RX ADMIN — POLYVINYL ALCOHOL 1 DROP: 14 SOLUTION/ DROPS OPHTHALMIC at 20:00

## 2018-02-15 RX ADMIN — IPRATROPIUM BROMIDE AND ALBUTEROL SULFATE 1 AMPULE: .5; 3 SOLUTION RESPIRATORY (INHALATION) at 12:25

## 2018-02-15 RX ADMIN — LISINOPRIL 10 MG: 10 TABLET ORAL at 10:05

## 2018-02-15 RX ADMIN — Medication 10 ML: at 20:01

## 2018-02-15 RX ADMIN — SODIUM CHLORIDE: 9 INJECTION, SOLUTION INTRAVENOUS at 03:15

## 2018-02-15 RX ADMIN — ALBUTEROL SULFATE 2.5 MG: 2.5 SOLUTION RESPIRATORY (INHALATION) at 03:37

## 2018-02-15 RX ADMIN — CLONIDINE HYDROCHLORIDE 0.1 MG: 0.1 TABLET ORAL at 00:51

## 2018-02-15 RX ADMIN — IPRATROPIUM BROMIDE AND ALBUTEROL SULFATE 1 AMPULE: .5; 3 SOLUTION RESPIRATORY (INHALATION) at 16:41

## 2018-02-15 RX ADMIN — ACETAMINOPHEN 650 MG: 325 TABLET ORAL at 15:41

## 2018-02-15 RX ADMIN — IPRATROPIUM BROMIDE AND ALBUTEROL SULFATE 1 AMPULE: .5; 3 SOLUTION RESPIRATORY (INHALATION) at 08:25

## 2018-02-15 RX ADMIN — IPRATROPIUM BROMIDE AND ALBUTEROL SULFATE 1 AMPULE: .5; 3 SOLUTION RESPIRATORY (INHALATION) at 20:04

## 2018-02-15 RX ADMIN — CLONIDINE HYDROCHLORIDE 0.1 MG: 0.1 TABLET ORAL at 06:51

## 2018-02-15 RX ADMIN — HYDRALAZINE HYDROCHLORIDE 10 MG: 20 INJECTION INTRAMUSCULAR; INTRAVENOUS at 03:50

## 2018-02-15 RX ADMIN — HEPARIN SODIUM 5000 UNITS: 5000 INJECTION, SOLUTION INTRAVENOUS; SUBCUTANEOUS at 23:45

## 2018-02-15 ASSESSMENT — PULMONARY FUNCTION TESTS
PIF_VALUE: 30
PIF_VALUE: 35
PIF_VALUE: 22
PIF_VALUE: 29
PIF_VALUE: 34
PIF_VALUE: 28
PIF_VALUE: 30
PIF_VALUE: 28
PIF_VALUE: 29
PIF_VALUE: 30
PIF_VALUE: 28
PIF_VALUE: 29
PIF_VALUE: 26
PIF_VALUE: 29

## 2018-02-15 NOTE — PROGRESS NOTES
PROGRESS NOTE          PATIENT NAME: Campbell RECORD NO. [id number]  DATE: 2/15/2018  SURGEON: Dr. Yadira Neal: Leanna Michel    HD: # [id number]    ASSESSMENT    Patient Active Problem List   Diagnosis    Pneumonia of both lower lobes due to infectious organism    [facility]STEMI (non-ST elevated myocardial infarction) [facility] [address]    Acute pulmonary edema ([address]    Hypertensive emergency    Acute respiratory failure with hypoxia and hypercapnia ([address]    Smoker    Morbid obesity ([facility] [address]    Elevated troponin    Obesity hypoventilation syndrome (HCC)    SOB (shortness of breath)    COPD exacerbation (HCC)    ARDS (adult respiratory distress syndrome) (HCC)    Fever    Disease due to rhinovirus       MEDICAL DECISION MAKING AND PLAN    1. The patient remains intubated and sedated. She's currently still in ARDS with a P/F ratio of 96. A tracheostomy and PEG will be considered when the patient has recovered from ARDS. SUBJECTIVE    Dhara Chase is seen and examined. Remains intubated and sedated, not able to lie flat due to severe bradycardia. OBJECTIVE  VITALS: Temp: Temp: 100.2 °F (37.9 °C)Temp  Av.5 °F (37.5 °C)  Min: 98.6 °F (37 °C)  Max: 100.2 °F (70.9 °C) BP Systolic (89RLW), IVV:392 , Min:117 , ZOW:138   Diastolic (47QPM), IVN:96, Min:35, Max:124   Pulse Pulse  Av.1  Min: 59  Max: 115 Resp Resp  Av.9  Min: 20  Max: 38 Pulse ox SpO2  Av.9 %  Min: 85 %  Max: 100 %  GENERAL: intubated and sedated  NEURO: moves extremities spontaneously  HEENT: PERRL  : deferred  LUNGS: coarse breath sounds bilaterally  HEART: normal rate and regular rhythm  ABDOMEN: soft, non-tender, non-distended and no guarding or peritoneal signs present  EXTREMITY: no cyanosis, clubbing or edema    I/O last 3 completed shifts: In: 1986 [I.V.:1023; NG/GT:963]  Out: 3535 [Urine:3535]    Drain/tube output:   In: 65 [I.V.:576; NG/GT:591]  Out: [address] [place] [Urine:1265]    LAB:  CBC:   Recent

## 2018-02-15 NOTE — PROGRESS NOTES
lore.  Enteric tube is not well visualized throughout its course but   can be followed at least to the level of the mid thoracic esophagus.       There is stable enlargement of cardiac silhouette.  There is similar   prominence and indistinctness of the pulmonary vasculature and diffuse   pulmonary haziness no sizable pleural effusions.  No pneumothorax. Visualized osseous structures appear intact and grossly unremarkable, given   the non dedicated imaging.           Impression   1. Given limitations, similar, expected positions of medical support devices.    2. Similar findings compatible with pulmonary edema/ARDS, atelectasis and/or   nonspecific pneumonitis.  Continued imaging follow-up is recommended.      2/10/2018 8:51 am       COMPARISON:   8 February 2018       HISTORY:   ORDERING SYSTEM PROVIDED HISTORY: follow infiltrates   TECHNOLOGIST PROVIDED HISTORY:   Reason for exam:->follow infiltrates       FINDINGS:   AP portable view of the chest time stamped at 821 hours demonstrates   overlying cardiac monitoring electrodes.  Endotracheal tube terminates 4.6 cm   above the lore.  The patient has cardiomegaly and pulmonary vascular   congestion with perihilar opacities consistent with pulmonary edema.  Lower   lung zones are obscured by overlying soft tissue.  Bilateral effusions are   evident.  Bibasilar opacities are noted, likely edema or atelectasis.  No   extrapleural air is noted.           Impression   Re- demonstration of cardiomegaly, vascular congestion and perihilar edema   with bibasilar opacities, likely edema.  There is little change compared to   prior study when allowances are made for differences in technique and   positioning.      IMAGING  Chest X-ray from 2-11-18:  Suboptimal examination.  Cardiomegaly, pulmonary vascular congestion, and   bilateral lower lobe airspace opacities remain essentially unchanged      1/26/18 cxr: worsening diffuse b/l airspace opacities suggestive of

## 2018-02-15 NOTE — CONSULTS
See dictation for details    Blood pressure (!) 212/61, pulse 89, temperature 100.2 °F (37.9 °C), temperature source Oral, resp. rate (!) 32, height 5' (1.524 m), weight (!) 365 lb 4.8 oz (165.7 kg), SpO2 95 %. Obese, on ventilator  Neck- no scars, no masses    Assessment  ARDS  Morbid Obesity  NSTEMI  Recurrent Episodes of asystole and bradycardia  Hypertension    Plan  She would benefit from a tracheostomy  Plan discussed with Dr. Stormy Vaughn.   He will discuss with family today and let me know when I can schedule the tracheostomy  She will need a cardiac clearance because of the recurrent episodes of bradycardia and asystole that occur when her airway is manipulated  Pre-anesthesia evaluation as well  Thanks

## 2018-02-15 NOTE — PROGRESS NOTES
Component Value Date    PROTIME 11.0 01/26/2018    INR 1.0 01/26/2018     PTT:    Lab Results   Component Value Date    APTT 30.2 01/27/2018       Comprehensive Metabolic Profile:   Recent Labs      02/13/18   0447  02/14/18   0509  02/15/18   0411   NA  139  141  140   K  4.0  4.1  4.3   CL  99  101  99   CO2  32*  28  29   BUN  33*  29*  25*   CREATININE  0.59  0.52  0.52   GLUCOSE  88  95  127*   CALCIUM  8.6  8.5*  8.4*      Magnesium:   Lab Results   Component Value Date    MG 1.7 02/12/2018    MG 2.2 02/10/2018    MG 2.3 02/06/2018     Phosphorus:   Lab Results   Component Value Date    PHOS 4.7 02/10/2018    PHOS 3.1 02/06/2018    PHOS 5.8 02/06/2018     Ionized Calcium:   Lab Results   Component Value Date    CAION 1.15 02/06/2018    CAION 1.12 02/05/2018    CAION 1.22 02/02/2018        Urinalysis: Lab Results   Component Value Date    NITRU NEGATIVE 01/26/2018    COLORU YELLOW 01/26/2018    PHUR 6.5 01/26/2018    WBCUA 2 TO 5 01/26/2018    RBCUA 0 TO 2 01/26/2018    MUCUS 1+ 01/26/2018    TRICHOMONAS NOT REPORTED 01/26/2018    YEAST NOT REPORTED 01/26/2018    BACTERIA NOT REPORTED 01/26/2018    SPECGRAV 1.013 01/26/2018    LEUKOCYTESUR NEGATIVE 01/26/2018    UROBILINOGEN Normal 01/26/2018    BILIRUBINUR NEGATIVE 01/26/2018    GLUCOSEU NEGATIVE 01/26/2018    KETUA TRACE 01/26/2018    AMORPHOUS NOT REPORTED 01/26/2018       HgBA1c:    Lab Results   Component Value Date    LABA1C 5.2 01/27/2018     TSH:    Lab Results   Component Value Date    TSH 1.23 01/27/2018     Lactic Acid: No results found for: LACTA   Troponin: No results for input(s): TROPONINI in the last 72 hours.       Radiology/Imaging:  No new images overnight    ASSESSMENT:     Patient Active Problem List    Diagnosis Date Noted    Fever     Disease due to rhinovirus     ARDS (adult respiratory distress syndrome) ([facility] 02/02/2018    COPD exacerbation ([facility] [facility]     NSTEMI (non-ST elevated myocardial infarction) ([facility] [facility] 01/27/2018    Acute lisinopril 10 mg qd. Start cardene gtt. Titrate for SBP < 180.  - Cardiology re-evaluation given sustained HTN and recurrent werner and pauses. Will need cardiology clearance for trach per ENT due to the fact patient has bradycardia and pauses due to airway manipulation  - Gen Surg consult for PEG, ENT consult for trach   - Urinary retention, if still retaining this afternoon, will replace mancilla. Marley Owens MD  Emergency Medicine PGY-2  Critical Care Resident  2/15/2018 7:19 AM  Attending Physician Statement  I have discussed the care of [address] [place] [address], including pertinent history and exam findings,  with the resident. I have seen and examined the patient and the key elements of all parts of the encounter have been performed by me. I agree with the assessment, plan and orders as documented by the resident with additions . D/w Dr Max Jo regarding need for trach   D/w angela who wanted me to call his aunt chema and update her . Chema updated in detail regarding need for trach and peg . Cardiology rec appreciated will inform Dr Bernice Dc for peg placement   bp control with ace and hydralazine   cardene gtt and wean to keep sbp 160 and less   Total critical care time caring for this patient with life threatening, unstable organ failure, including direct patient contact, management of life support systems, review of data including imaging and labs, discussions with other team members and physicians at least 28   Min so far today, excluding procedures. Treatment plan Discussed with nursing staff in detail , all questions answered . Sreekanth Mancini MD   2/15/2018   5:05 PM    Please note that this chart was generated using voice recognition Dragon dictation software. Although every effort was made to ensure the accuracy of this automated transcription, some errors in transcription may have occurred.

## 2018-02-16 LAB
ALLEN TEST: POSITIVE
ANION GAP SERPL CALCULATED.3IONS-SCNC: 10 MMOL/L (ref 9–17)
BNP INTERPRETATION: ABNORMAL
BUN BLDV-MCNC: 20 MG/DL (ref 6–20)
BUN/CREAT BLD: ABNORMAL (ref 9–20)
CALCIUM SERPL-MCNC: 8.4 MG/DL (ref 8.6–10.4)
CHLORIDE BLD-SCNC: 102 MMOL/L (ref 98–107)
CO2: 31 MMOL/L (ref 20–31)
CREAT SERPL-MCNC: 0.45 MG/DL (ref 0.5–0.9)
FIO2: 45
GFR AFRICAN AMERICAN: >60 ML/MIN
GFR NON-AFRICAN AMERICAN: >60 ML/MIN
GFR SERPL CREATININE-BSD FRML MDRD: ABNORMAL ML/MIN/{1.73_M2}
GFR SERPL CREATININE-BSD FRML MDRD: ABNORMAL ML/MIN/{1.73_M2}
GLUCOSE BLD-MCNC: 121 MG/DL (ref 70–99)
HCT VFR BLD CALC: 33.3 % (ref 36.3–47.1)
HEMOGLOBIN: 9.5 G/DL (ref 11.9–15.1)
MCH RBC QN AUTO: 25.7 PG (ref 25.2–33.5)
MCHC RBC AUTO-ENTMCNC: 28.5 G/DL (ref 28.4–34.8)
MCV RBC AUTO: 90.2 FL (ref 82.6–102.9)
MODE: ABNORMAL
NEGATIVE BASE EXCESS, ART: ABNORMAL (ref 0–2)
NRBC AUTOMATED: 0 PER 100 WBC
O2 DEVICE/FLOW/%: ABNORMAL
PATIENT TEMP: ABNORMAL
PDW BLD-RTO: 16.9 % (ref 11.8–14.4)
PLATELET # BLD: 241 K/UL (ref 138–453)
PMV BLD AUTO: 11 FL (ref 8.1–13.5)
POC HCO3: 36.7 MMOL/L (ref 21–28)
POC O2 SATURATION: 88 % (ref 94–98)
POC PCO2 TEMP: ABNORMAL MM HG
POC PCO2: 52.6 MM HG (ref 35–48)
POC PH TEMP: ABNORMAL
POC PH: 7.45 (ref 7.35–7.45)
POC PO2 TEMP: ABNORMAL MM HG
POC PO2: 53.7 MM HG (ref 83–108)
POSITIVE BASE EXCESS, ART: 11 (ref 0–3)
POTASSIUM SERPL-SCNC: 3.7 MMOL/L (ref 3.7–5.3)
PRO-BNP: 1040 PG/ML
RBC # BLD: 3.69 M/UL (ref 3.95–5.11)
SAMPLE SITE: ABNORMAL
SODIUM BLD-SCNC: 143 MMOL/L (ref 135–144)
TCO2 (CALC), ART: 38 MMOL/L (ref 22–29)
WBC # BLD: 11 K/UL (ref 3.5–11.3)

## 2018-02-16 PROCEDURE — 6370000000 HC RX 637 (ALT 250 FOR IP): Performed by: INTERNAL MEDICINE

## 2018-02-16 PROCEDURE — 6370000000 HC RX 637 (ALT 250 FOR IP): Performed by: STUDENT IN AN ORGANIZED HEALTH CARE EDUCATION/TRAINING PROGRAM

## 2018-02-16 PROCEDURE — 2580000003 HC RX 258: Performed by: STUDENT IN AN ORGANIZED HEALTH CARE EDUCATION/TRAINING PROGRAM

## 2018-02-16 PROCEDURE — 2000000000 HC ICU R&B

## 2018-02-16 PROCEDURE — 6360000002 HC RX W HCPCS: Performed by: INTERNAL MEDICINE

## 2018-02-16 PROCEDURE — 6360000002 HC RX W HCPCS: Performed by: EMERGENCY MEDICINE

## 2018-02-16 PROCEDURE — 82803 BLOOD GASES ANY COMBINATION: CPT

## 2018-02-16 PROCEDURE — 83880 ASSAY OF NATRIURETIC PEPTIDE: CPT

## 2018-02-16 PROCEDURE — 36415 COLL VENOUS BLD VENIPUNCTURE: CPT

## 2018-02-16 PROCEDURE — 99291 CRITICAL CARE FIRST HOUR: CPT | Performed by: INTERNAL MEDICINE

## 2018-02-16 PROCEDURE — 6360000002 HC RX W HCPCS: Performed by: STUDENT IN AN ORGANIZED HEALTH CARE EDUCATION/TRAINING PROGRAM

## 2018-02-16 PROCEDURE — 94003 VENT MGMT INPAT SUBQ DAY: CPT

## 2018-02-16 PROCEDURE — 2500000003 HC RX 250 WO HCPCS: Performed by: INTERNAL MEDICINE

## 2018-02-16 PROCEDURE — 94640 AIRWAY INHALATION TREATMENT: CPT

## 2018-02-16 PROCEDURE — 99233 SBSQ HOSP IP/OBS HIGH 50: CPT | Performed by: INTERNAL MEDICINE

## 2018-02-16 PROCEDURE — 94762 N-INVAS EAR/PLS OXIMTRY CONT: CPT

## 2018-02-16 PROCEDURE — 2580000003 HC RX 258: Performed by: INTERNAL MEDICINE

## 2018-02-16 PROCEDURE — 99254 IP/OBS CNSLTJ NEW/EST MOD 60: CPT | Performed by: INTERNAL MEDICINE

## 2018-02-16 PROCEDURE — 36600 WITHDRAWAL OF ARTERIAL BLOOD: CPT

## 2018-02-16 PROCEDURE — 94770 HC ETCO2 MONITOR DAILY: CPT

## 2018-02-16 PROCEDURE — 80048 BASIC METABOLIC PNL TOTAL CA: CPT

## 2018-02-16 PROCEDURE — 6370000000 HC RX 637 (ALT 250 FOR IP): Performed by: HOSPITALIST

## 2018-02-16 PROCEDURE — 85027 COMPLETE CBC AUTOMATED: CPT

## 2018-02-16 PROCEDURE — 97110 THERAPEUTIC EXERCISES: CPT

## 2018-02-16 RX ORDER — FUROSEMIDE 10 MG/ML
20 INJECTION INTRAMUSCULAR; INTRAVENOUS ONCE
Status: COMPLETED | OUTPATIENT
Start: 2018-02-16 | End: 2018-02-16

## 2018-02-16 RX ADMIN — SODIUM CHLORIDE 1.5 MG/HR: 0.9 INJECTION, SOLUTION INTRAVENOUS at 21:39

## 2018-02-16 RX ADMIN — FAMOTIDINE 20 MG: 20 TABLET, FILM COATED ORAL at 21:39

## 2018-02-16 RX ADMIN — SODIUM CHLORIDE: 9 INJECTION, SOLUTION INTRAVENOUS at 01:03

## 2018-02-16 RX ADMIN — HYDRALAZINE HYDROCHLORIDE 50 MG: 50 TABLET, FILM COATED ORAL at 15:02

## 2018-02-16 RX ADMIN — LISINOPRIL 10 MG: 10 TABLET ORAL at 08:58

## 2018-02-16 RX ADMIN — FAMOTIDINE 20 MG: 20 TABLET, FILM COATED ORAL at 08:58

## 2018-02-16 RX ADMIN — Medication 150 MCG/HR: at 08:08

## 2018-02-16 RX ADMIN — IPRATROPIUM BROMIDE AND ALBUTEROL SULFATE 1 AMPULE: .5; 3 SOLUTION RESPIRATORY (INHALATION) at 19:51

## 2018-02-16 RX ADMIN — Medication 125 MCG/HR: at 22:46

## 2018-02-16 RX ADMIN — POLYVINYL ALCOHOL 1 DROP: 14 SOLUTION/ DROPS OPHTHALMIC at 18:13

## 2018-02-16 RX ADMIN — POLYVINYL ALCOHOL 1 DROP: 14 SOLUTION/ DROPS OPHTHALMIC at 09:08

## 2018-02-16 RX ADMIN — DESMOPRESSIN ACETATE 40 MG: 0.2 TABLET ORAL at 21:39

## 2018-02-16 RX ADMIN — ALBUTEROL SULFATE 2.5 MG: 2.5 SOLUTION RESPIRATORY (INHALATION) at 03:55

## 2018-02-16 RX ADMIN — Medication 10 ML: at 08:58

## 2018-02-16 RX ADMIN — ACETAMINOPHEN 650 MG: 325 TABLET ORAL at 00:22

## 2018-02-16 RX ADMIN — HEPARIN SODIUM 5000 UNITS: 5000 INJECTION, SOLUTION INTRAVENOUS; SUBCUTANEOUS at 15:00

## 2018-02-16 RX ADMIN — PROPOFOL 5 MCG/KG/MIN: 10 INJECTION, EMULSION INTRAVENOUS at 10:33

## 2018-02-16 RX ADMIN — HYDRALAZINE HYDROCHLORIDE 50 MG: 50 TABLET, FILM COATED ORAL at 05:42

## 2018-02-16 RX ADMIN — Medication 150 MCG/HR: at 01:24

## 2018-02-16 RX ADMIN — Medication 125 MCG/HR: at 14:57

## 2018-02-16 RX ADMIN — IPRATROPIUM BROMIDE AND ALBUTEROL SULFATE 1 AMPULE: .5; 3 SOLUTION RESPIRATORY (INHALATION) at 12:47

## 2018-02-16 RX ADMIN — IPRATROPIUM BROMIDE AND ALBUTEROL SULFATE 1 AMPULE: .5; 3 SOLUTION RESPIRATORY (INHALATION) at 16:07

## 2018-02-16 RX ADMIN — Medication 10 ML: at 21:39

## 2018-02-16 RX ADMIN — FUROSEMIDE 20 MG: 10 INJECTION, SOLUTION INTRAVENOUS at 10:51

## 2018-02-16 RX ADMIN — HYDRALAZINE HYDROCHLORIDE 50 MG: 50 TABLET, FILM COATED ORAL at 21:39

## 2018-02-16 RX ADMIN — IPRATROPIUM BROMIDE AND ALBUTEROL SULFATE 1 AMPULE: .5; 3 SOLUTION RESPIRATORY (INHALATION) at 08:30

## 2018-02-16 RX ADMIN — SODIUM CHLORIDE 2.5 MG/HR: 0.9 INJECTION, SOLUTION INTRAVENOUS at 06:46

## 2018-02-16 RX ADMIN — CLONIDINE HYDROCHLORIDE 0.1 MG: 0.1 TABLET ORAL at 10:15

## 2018-02-16 ASSESSMENT — PAIN SCALES - GENERAL: PAINLEVEL_OUTOF10: 2

## 2018-02-16 ASSESSMENT — PULMONARY FUNCTION TESTS
PIF_VALUE: 27
PIF_VALUE: 30
PIF_VALUE: 26
PIF_VALUE: 28
PIF_VALUE: 29
PIF_VALUE: 20

## 2018-02-16 NOTE — PROGRESS NOTES
congestion and bilateral lower airspace opacities unchanged     CULTURES  1/26/18 blood cx: no growth  1/26/18 rine cx: no growth  1/29/18 sputum cx: no growth  2/4/18 urine cx: candida  CFU  2/4/18 blood cx: no growth 24hrs        LABS  WBC 15.2-11.8-12.1- 8.8  Procalcitonin:2/2/18 0.85 (high)     DISCUSSION:  Patient with COPD, morbid obesity. Presented with acute rhinoviral infection, pneumonia and respiratory failure.  Has required ventilatory support.  Clinical picture suggestive of ARDS. Patient is undergoing treatment with the ARDS protocol. Some improvement has been achieved.      There is no treatment for the acute rhinoviral infection.       Ceftriaxone was given initially for presumptive associated community-acquired pneumonia, although cultures have not yielded any bacterial growth. Course of rocephin completed. No signs of bacterial respiratory infection on follow-up.     Patient status has shown progressive improvement over the past several days. Ventilation remains a challenge. She is to get a tracheostomy and PEG 2-17-18. Discussed with, RN, CC.     I  Physical Examination :   Patient Vitals for the past 8 hrs:   BP Temp Temp src Pulse Resp SpO2 Weight   02/15/18 0651 (!) 190/60 - - - - - -   02/15/18 0636 - - - - - - (!) 365 lb 4.8 oz (165.7 kg)   02/15/18 0630 (!) 179/59 - - 95 (!) 31 91 % -   02/15/18 0615 (!) 181/57 - - 97 (!) 34 93 % -   02/15/18 0600 (!) 174/59 - - 99 26 100 % -   02/15/18 0545 (!) 155/50 - - 88 29 93 % -   02/15/18 0530 - - - 70 20 95 % -   02/15/18 0515 - - - 115 (!) 34 98 % -   02/15/18 0500 - - - 86 (!) 33 93 % -   02/15/18 0445 (!) 167/60 - - 84 (!) 31 93 % -   02/15/18 0438 - - - - (!) 33 94 % -   02/15/18 0430 (!) 173/124 - - 88 30 91 % -   02/15/18 0415 (!) 188/50 - - 96 (!) 36 91 % -   02/15/18 0400 (!) 186/59 100.2 °F (37.9 °C) Oral 98 (!) 33 92 % -   02/15/18 0350 (!) 188/58 - - - - - -   02/15/18 0345 (!) 188/58 - - 90 (!) 32 93 % -   02/15/18 9505 - - - 97 (!) 32 94 % -   02/15/18 0330 (!) 179/63 - - 99 (!) 33 94 % -   02/15/18 0315 (!) 168/50 - - 97 (!) 32 94 % -   02/15/18 0300 (!) 169/61 - - 98 (!) 34 97 % -   02/15/18 0245 (!) 153/54 - - 92 (!) 31 94 % -   02/15/18 0230 (!) 147/47 - - 86 26 94 % -   02/15/18 0215 (!) 161/56 - - 87 27 93 % -   02/15/18 0200 (!) 122/37 - - 91 29 92 % -   02/15/18 0145 (!) 127/37 - - 92 30 91 % -   02/15/18 0130 (!) 133/38 - - 89 29 91 % -   02/15/18 0115 (!) 155/43 - - 83 29 90 % -   02/15/18 0100 (!) 182/50 - - 89 (!) 31 91 % -   02/15/18 0051 (!) 190/54 - - - - - -   02/15/18 0045 (!) 190/54 - - 88 (!) 32 91 % -   02/15/18 0030 (!) 212/62 - - 72 (!) 31 96 % -     General Appearance: sedated on the ventilator. No overall change  Eyes: Sclera anicteric; conjunctivae pink. No hemorhages, no embolic phenomena. ENT:Oropharynx clear, without erythema, exudate, or thrush. No nasal drainage. Secretions thin, white  Neck: Supple, without lymphadenopathy. No JVD. Pulmonary/Chest: coarse rales and rhonchi bilaterally  Cardiovascular:Regular rate and rhythm without murmurs, rubs, or gallops. S1 and S2 normal.  Abdomen: Soft, non tender. Bowel sounds normal. No cramps. No organomegaly, FMS inplace  All four Extremities:No cyanosis, clubbing, or effusions. 1+ edema in lower and upper extremities  Neurologic:No gross sensory or motor deficits. Sedated  Skin: No rash or lesions. IV site inspected: no inflammation.     Medical Decision Making -Laboratory:   I have independently reviewed/ordered the following labs:    EXAMINATION:   SINGLE VIEW OF THE CHEST       2/7/2018 7:26 am       COMPARISON:   Chest x-ray from 02/06/2018       HISTORY:   ORDERING SYSTEM PROVIDED HISTORY: eval, ARDS   TECHNOLOGIST PROVIDED HISTORY:   Reason for exam:->eval, ARDS       FINDINGS:   Endotracheal tube extends to the mid thoracic trachea approximately 4.3 above   the lore.  Enteric tube is not well visualized throughout its course but   can be followed at least to the level of the mid thoracic esophagus.       There is stable enlargement of cardiac silhouette. Harley Skye is similar   prominence and indistinctness of the pulmonary vasculature and diffuse   pulmonary haziness no sizable pleural effusions.  No pneumothorax. Visualized osseous structures appear intact and grossly unremarkable, given   the non dedicated imaging.           Impression   1. Given limitations, similar, expected positions of medical support devices.    2. Similar findings compatible with pulmonary edema/ARDS, atelectasis and/or   nonspecific pneumonitis.  Continued imaging follow-up is recommended.      2/10/2018 8:51 am       COMPARISON:   8 February 2018       HISTORY:   ORDERING SYSTEM PROVIDED HISTORY: follow infiltrates   TECHNOLOGIST PROVIDED HISTORY:   Reason for exam:->follow infiltrates       FINDINGS:   AP portable view of the chest time stamped at 821 hours demonstrates   overlying cardiac monitoring electrodes.  Endotracheal tube terminates 4.6 cm   above the lore.  The patient has cardiomegaly and pulmonary vascular   congestion with perihilar opacities consistent with pulmonary edema.  Lower   lung zones are obscured by overlying soft tissue.  Bilateral effusions are   evident.  Bibasilar opacities are noted, likely edema or atelectasis.  No   extrapleural air is noted.           Impression   Re- demonstration of cardiomegaly, vascular congestion and perihilar edema   with bibasilar opacities, likely edema.  There is little change compared to   prior study when allowances are made for differences in technique and   positioning.      IMAGING  Chest X-ray from 2-11-18:  Suboptimal examination.  Cardiomegaly, pulmonary vascular congestion, and   bilateral lower lobe airspace opacities remain essentially unchanged      1/26/18 cxr: worsening diffuse b/l airspace opacities suggestive of multilobar pneumonia or pulmonary edema     1/26/18 echo: hyperdynamic left ventricle, otherwise Medical Decision Making-Imaging:       Medical Decision Making-Other: Thank you for allowing us to participate in the care of this patient. Please call with questions.     Coco Sanchez MD  Pager: [phone] - Office: [phone]

## 2018-02-16 NOTE — PROGRESS NOTES
0348 - - - 115 30 93 % -   02/16/18 0345 (!) 203/80 - - 115 26 96 % -   02/16/18 0330 - - - 114 (!) 32 91 % -   02/16/18 0315 (!) 184/70 - - 106 30 96 % -   02/16/18 0300 (!) 140/43 - - 96 29 97 % -   02/16/18 0245 (!) 149/48 - - 95 29 96 % -   02/16/18 0230 (!) 149/48 - - 100 (!) 31 96 % -   02/16/18 0215 (!) 140/46 - - 94 27 95 % -   02/16/18 0200 (!) 170/63 - - 111 27 100 % -   02/16/18 0145 (!) 158/60 100.4 °F (38 °C) Oral 97 30 94 % -   02/16/18 0130 (!) 150/53 - - 98 29 94 % -         Intake/Output Summary (Last 24 hours) at 02/16/18 0923  Last data filed at 02/16/18 0830   Gross per 24 hour   Intake             1672 ml   Output             1823 ml   Net             -151 ml       Date 02/16/18 0000 - 02/16/18 2359   Shift 9047-4303 7825-9858 1129-5417 24 Hour Total   I  N  T  A  K  E   I.V.  (mL/kg) 300  (1.8)   300  (1.8)    NG/GT  (mL/kg) 334  (2)   334  (2)    Shift Total  (mL/kg) 634  (3.9)   634  (3.9)   O  U  T  P  U  T   Urine  (mL/kg/hr) 511  (0.4) 100  611    Emesis/NG output  (mL/kg)  50  (0.3)  50  (0.3)    Shift Total  (mL/kg) 511  (3.1) 150  (0.9)  661  (4)   Weight (kg) 163.9 163.9 163.9 163.9     Wt Readings from Last 3 Encounters:   02/16/18 (!) 361 lb 5.3 oz (163.9 kg)     Body mass index is 70.57 kg/m². PHYSICAL EXAM:  GEN:   intubated, awake  EYES:  pupils equal, round, and reactive to light, irritation with mild erythema no edema   HEENT:  Normocepalic, without obvious abnormality, no obvious thrush  LUNGS:  Intubated, equal air entry, b/l crackles but difficult to auscultate due to body habitus  CV:    Regular rate and rhythm, S1/S2  ABDOMEN:   Obese abdomen, non-tender, normal bowel sounds  MSK:    There is no redness, warmth, or swelling of the joints. Full passive range of motion noted. NEURO[de-identified]   Off of sedation, opening eyes, not following commands, withdraw from pain in uppers, no response in lowers  SKIN:   No bruising or bleeding. Normal skin color, texture, and turgor. s  Mean Airway Pressure: 17.1 cmH20  Plateau Pressure: 30 cmH20  Static Compliance: 22 mL/cmH2O  Dynamic Compliance: 20 mL/cmH2O  Total PEEP: 12 cmH20  Auto PEEP: 1 cmH20  Nitric Oxide/Epoprostenol In Use?: No  NO Set: (S) 0  NO Analyzed: 0 ppm  NO2 Analyzed: 0 ppm  Additional Respiratory  Assessments  Pulse: 104  Resp: 29  SpO2: 95 %  End Tidal CO2: 63 (%)  Position: Semi-Blakely's  Humidification Temp: 37.1  HME (Heat moisture exchanger): No  Circuit Condensation: Drained  Oral Care Completed?: Yes  Oral Care: Teeth brushed, Mouth swabbed, Mouth suctioned  Subglottic Suction Done?: Yes  Cuff Pressure (cm H2O): 22 cm H2O  Skin barrier applied: Yes    ABGs:   Arterial Blood Gas result:  pH 7.452; pCO2 52.6; pO2 53.7; HCO3 36.7; BE 11; %O2 Sat 88.   Lab Results   Component Value Date    AUD0WIX 38 02/16/2018    FIO2 45.0 02/16/2018         DATA:  Complete Blood Count:   Recent Labs      02/14/18   0509  02/15/18   0411  02/16/18   0407   WBC  8.8  10.8  11.0   RBC  3.57*  3.51*  3.69*   HGB  9.3*  9.0*  9.5*   HCT  33.2*  32.3*  33.3*   MCV  93.0  92.0  90.2   MCH  26.1  25.6  25.7   MCHC  28.0*  27.9*  28.5   RDW  16.1*  16.5*  16.9*   PLT  205  218  241   MPV  10.9  11.0  11.0        Last 3 Blood Glucose:   Recent Labs      02/14/18   0509  02/15/18   0411  02/16/18   0407   GLUCOSE  95  127*  121*        PT/INR:    Lab Results   Component Value Date    PROTIME 11.0 01/26/2018    INR 1.0 01/26/2018     PTT:    Lab Results   Component Value Date    APTT 30.2 01/27/2018       Comprehensive Metabolic Profile:   Recent Labs      02/14/18   0509  02/15/18   0411  02/16/18   0407   NA  141  140  143   K  4.1  4.3  3.7   CL  101  99  102   CO2  28  29  31   BUN  29*  25*  20   CREATININE  0.52  0.52  0.45*   GLUCOSE  95  127*  121*   CALCIUM  8.5*  8.4*  8.4*      Magnesium:   Lab Results   Component Value Date    MG 1.7 02/12/2018    MG 2.2 02/10/2018    MG 2.3 02/06/2018     Phosphorus:   Lab Results   Component Value Date    PHOS 4.7 02/10/2018    PHOS 3.1 02/06/2018    PHOS 5.8 02/06/2018     Ionized Calcium:   Lab Results   Component Value Date    CAION 1.15 02/06/2018    CAION 1.12 02/05/2018    CAION 1.22 02/02/2018        Urinalysis:   Lab Results   Component Value Date    NITRU NEGATIVE 01/26/2018    COLORU YELLOW 01/26/2018    PHUR 6.5 01/26/2018    WBCUA 2 TO 5 01/26/2018    RBCUA 0 TO 2 01/26/2018    MUCUS 1+ 01/26/2018    TRICHOMONAS NOT REPORTED 01/26/2018    YEAST NOT REPORTED 01/26/2018    BACTERIA NOT REPORTED 01/26/2018    SPECGRAV 1.013 01/26/2018    LEUKOCYTESUR NEGATIVE 01/26/2018    UROBILINOGEN Normal 01/26/2018    BILIRUBINUR NEGATIVE 01/26/2018    GLUCOSEU NEGATIVE 01/26/2018    KETUA TRACE 01/26/2018    AMORPHOUS NOT REPORTED 01/26/2018       HgBA1c:    Lab Results   Component Value Date    LABA1C 5.2 01/27/2018     TSH:    Lab Results   Component Value Date    TSH 1.23 01/27/2018     Lactic Acid: No results found for: LACTA   Troponin: No results for input(s): TROPONINI in the last 72 hours. Radiology/Imaging:  No new images overnight    ASSESSMENT:     Patient Active Problem List    Diagnosis Date Noted    Fever     Disease due to rhinovirus     ARDS (adult respiratory distress syndrome) ([facility] 02/02/2018    COPD exacerbation ([facility]     NSTEMI (non-ST elevated myocardial infarction) ([facility] [address] 01/27/2018    Acute pulmonary edema ([address] 01/27/2018    Hypertensive emergency 01/27/2018    Acute respiratory failure with hypoxia and hypercapnia ([address] 01/27/2018    Smoker 01/27/2018    Morbid obesity ([facility] 01/27/2018    Elevated troponin 01/27/2018    Obesity hypoventilation syndrome ([facility] [address] 01/27/2018    SOB (shortness of breath) 01/27/2018    Pneumonia of both lower lobes due to infectious organism 01/26/2018          PLAN:     WEAN PER PROTOCOL:  [x] No   [] Yes  [] N/A    ICU PROPHYLAXIS:  Stress ulcer:  [x] PPI Agent  [] H3Lmmns [] Sucralfate  [] Other:  VTE:   [] Enoxaparin  [x] Unfract.  Heparin Subcut  [] EPC Cuffs    NUTRITION:  [] NPO [x] Tube Feeding (Specify: low lisa, high protein @ 40 cc ) [] TPN  [] PO    HOME MEDS RECONCILED: [] No  [x] Yes    CONSULTATION NEEDED:  [] No  [x] Yes    FAMILY UPDATED:    [] No  [x] Yes    TRANSFER OUT OF ICU:   [x] No  [] Yes        Additional Assessment:  Principal Problem:    ARDS (adult respiratory distress syndrome) ([facility])  Active Problems:    Pneumonia of both lower lobes due to infectious organism    NSTEMI (non-ST elevated myocardial infarction) ([facility] [address]    Acute pulmonary edema (HCC)    Hypertensive emergency    Acute respiratory failure with hypoxia and hypercapnia (HCC)    Smoker    Morbid obesity ([facility] [address]    Elevated troponin    Obesity hypoventilation syndrome (HCC)    SOB (shortness of breath)    COPD exacerbation ([facility])    Fever    Disease due to rhinovirus  Resolved Problems:    * No resolved hospital problems. *            Plan:  - Continue PRVC ventilation, wean as tolerated. Goal is maintain O2 Saturation at 92% to wean FiO2 to 45%, PEEP is at 12.    - Maintain Hb>8 and O2 sat >92% to maintain CO   - Continue albuterol sched & prn as well as duoneb sched  - Paralytic d/c yesterday. Currently off of propofol, can restart if needed for agitation  - Continue Fentanyl gtt, pushes prn   - Hypertension under better control. Goal SBP < 160. Continue Hydralazine 50 mg q8h, lisinopril 10 mg qd, and clonidine 0.1 mg q6h prn. Avoid beta-blockers. On Cardene gtt, attempt to wean  - ENT consulted for trach  - GI consulted for PEG  - Cardiology re-evaluated. Cleared for OR. Recommend having atropine at bedside and dopamine prn while in the OR  - Urinary output decreased over last 24 hours w/ 0.425 cc/hr. Elevated BNP of 1040. Will give a 1 time dose of Lasix 20 mg this AM.    - Continue TFs based on GI plan today. - Febrile overnight, responded to tylenol. No leukocytosis. Continue to monitor.          Adiel Heller MD  Emergency Medicine PGY-2  Critical Care Resident  2/16/2018 9:23 AM   Attending Physician Statement  I have discussed the care of [address] [place], including pertinent history and exam findings,  with the resident. I have seen and examined the patient and the key elements of all parts of the encounter have been performed by me. I agree with the assessment, plan and orders as documented by the resident with additions . Family son updated   D/w dr Andreina Antoine and Levora Bolk   Trach peg grgeory   Cont cardene gtt   Cont fentanyl   Cont present vent setting        Total critical care time caring for this patient with life threatening, unstable organ failure, including direct patient contact, management of life support systems, review of data including imaging and labs, discussions with other team members and physicians at least 27   Min so far today, excluding procedures. Treatment plan Discussed with nursing staff in detail , all questions answered . Guilherme Erazo MD   2/16/2018   7:06 PM    Please note that this chart was generated using voice recognition Dragon dictation software. Although every effort was made to ensure the accuracy of this automated transcription, some errors in transcription may have occurred.

## 2018-02-16 NOTE — PROGRESS NOTES
Physical Therapy  DATE: 2018  NAME: Luis Carlos Canales  MRN: [id number]   : 1975    Subjective: RN gave OK for PROM this date  Pain: No signs of pain or distress with stable vitals throughout  Patient follows: No commands  Is patient on ventilator: Yes  Is patient on sedation: Yes  Precautions: Fall risk     Therapeutic exercises:  PROM all planes x 10 reps BUE and BLE. Bilateral gastrocnemius stretching 2x 20\" ea    Goals  Short Term Goals  Short term goal 1: prevent contractures x 4  Short term goal 2: facilitate active movement x 4 when pt off sedation  Short term goal 3: mobilize pt when off sedation and set goals          Plan: Progress functional mobility as medically appropriate.    Time In: 1010  Time Out: 1020  Time Coded Minutes (treatment minutes): 10  Rehab Potential: Fair  Treatments/week: 9-3J/QK    Lesa Lagos, PT

## 2018-02-16 NOTE — CONSULTS
(NICODERM CQ) 21 MG/24HR Place 1 patch onto the skin every 24 hours    Historical Provider, MD   predniSONE (DELTASONE) 20 MG tablet Take 20 mg by mouth daily 2 tabs daily for 5 days, then 1 tab daily for 5 days    Historical Provider, MD   azithromycin (ZITHROMAX) 250 MG tablet Take 250 mg by mouth daily For 4 doses    Historical Provider, MD   doxycycline hyclate (VIBRA-TABS) 100 MG tablet Take 100 mg by mouth 2 times daily 2 times daily for 10 days    Historical Provider, MD   furosemide (LASIX) 20 MG tablet Take 20 mg by mouth daily    Historical Provider, MD   tiotropium (SPIRIVA) 18 MCG inhalation capsule Inhale 18 mcg into the lungs daily    Historical Provider, MD   albuterol sulfate  (90 Base) MCG/ACT inhaler Inhale 2 puffs into the lungs every 6 hours as needed for Wheezing    Historical Provider, MD   oxyCODONE-acetaminophen (PERCOCET)  MG per tablet Take 1 tablet by mouth every 6 hours as needed for Pain.     Historical Provider, MD   ferrous sulfate 325 (65 Fe) MG tablet Take 325 mg by mouth 2 times daily (with meals)     Historical Provider, MD   amLODIPine (NORVASC) 10 MG tablet Take 10 mg by mouth daily    Historical Provider, MD   azithromycin (ZITHROMAX) 1 g powder Take 1 packet by mouth once    Historical Provider, MD   hydrochlorothiazide (MICROZIDE) 12.5 MG capsule Take 12.5 mg by mouth every morning    Historical Provider, MD   lisinopril (PRINIVIL;ZESTRIL) 20 MG tablet Take 20 mg by mouth daily    Historical Provider, MD   loratadine (CLARITIN) 10 MG capsule Take 10 mg by mouth daily    Historical Provider, MD   Fluticasone-Salmeterol (ADVAIR DISKUS IN) Inhale into the lungs    Historical Provider, MD   fluticasone (FLONASE) 50 MCG/ACT nasal spray 1 spray by Nasal route daily    Historical Provider, MD   nicotine (NICOTINE STEP 2) 14 MG/24HR Place 1 patch onto the skin every 24 hours    Historical Provider, MD     .  CURRENT MEDICATIONS:  Scheduled Meds:   lisinopril  10 mg Oral

## 2018-02-16 NOTE — PROGRESS NOTES
for input(s): CHOL, HDL in the last 72 hours. Invalid input(s): LDLCALCU  INR: No results for input(s): INR in the last 72 hours. Objective:   Vitals: BP (!) 134/53   Pulse 104   Temp 100.4 °F (38 °C) (Oral)   Resp 29   Ht 5' (1.524 m)   Wt (!) 361 lb 5.3 oz (163.9 kg)   SpO2 95%   BMI 70.57 kg/m²     General appearance: Intubated , in no acute distress. Off sedation  HEENT: Head: Normocephalic, no lesions, without obvious abnormality  Neck: no JVD  Lungs: Bilateral crackles, no wheezes. Difficult to assess due to body habitus  Heart: regular rate and rhythm, S1, S2 normal, no murmur, click, rub or gallop  Abdomen: soft, non-tender; bowel sounds normal  Extremities: +2 LE edema      Echocardiogram:    1/27/18  Echocardiogram:  Technically difficult study. All segments not well seen. No comment can be  made regarding specific wall motion. LV chamber dimension is normal. Systolic function appears to be hyperdynamic  with an estimated EF of 65%. Right ventricular dilatation with normal systolic function. No significant valvular regurgitation or stenosis seen. Assessment / Acute Cardiac Problems:   1. ARDS  2. NSTEMI  3. Pneumonia bilateral lower lobes  4. Obesity hypoventilation syndrome  5. Morbid obesity        Plan of Treatment:   1. Acute respiratory failure- plan for trach and PEG. Patient is intermediate cardiac risk. Episodes of bradycardia noted while suctioning, likely secondary to airway manipulation causing vagal stimulation and hypoxia. 2. Consider atropine if heart rate is under 35 BPM and if patient remains bradycardic patient can be started on dopamine during the procedure  3. Plan for trach possibly on Sunday or Monday. Discussed with patient and nursing.        Adela Quinn MD  Resident, Cardiovascular Diseases   [facility]     Attending Physician Statement  I have discussed the care of Stef Day, including pertinent history and exam findings,  with the resident. I have seen and examined the patient and the key elements of all parts of the encounter have been performed by me. I agree with the assessment, plan and orders as documented by the resident.

## 2018-02-16 NOTE — PROGRESS NOTES
Pt status unchanged. No further episodes of asystole reported. Cardiology noted reviewed. Blood pressure (!) 171/62, pulse 86, temperature 100.4 °F (38 °C), temperature source Oral, resp. rate 27, height 5' (1.524 m), weight (!) 361 lb 5.3 oz (163.9 kg), SpO2 92 %. Exam- unchanged    ASSESSMENT:  1. ARDS. 2.  Morbid obesity. 3.  Respiratory failure. 4.  Hypertensive emergency. 5.  Recurrent episodes of asystole and bradycardia.     PLAN:    She would benefit from a tracheostomy at the request of the critical care team.   I just obtained verbal consent from the patients sister Britton Dayton tube feeds after midnight  Hold anti-coagulation 12 hours prior.

## 2018-02-17 ENCOUNTER — ANESTHESIA (OUTPATIENT)
Dept: OPERATING ROOM | Age: 43
DRG: 005 | End: 2018-02-17
Payer: MEDICARE

## 2018-02-17 ENCOUNTER — APPOINTMENT (OUTPATIENT)
Dept: GENERAL RADIOLOGY | Age: 43
DRG: 005 | End: 2018-02-17
Payer: MEDICARE

## 2018-02-17 ENCOUNTER — ANESTHESIA EVENT (OUTPATIENT)
Dept: OPERATING ROOM | Age: 43
DRG: 005 | End: 2018-02-17
Payer: MEDICARE

## 2018-02-17 VITALS
RESPIRATION RATE: 16 BRPM | SYSTOLIC BLOOD PRESSURE: 90 MMHG | OXYGEN SATURATION: 99 % | TEMPERATURE: 96.5 F | DIASTOLIC BLOOD PRESSURE: 47 MMHG

## 2018-02-17 PROBLEM — Z93.1 STATUS POST INSERTION OF PERCUTANEOUS ENDOSCOPIC GASTROSTOMY (PEG) TUBE (HCC): Status: ACTIVE | Noted: 2018-02-17

## 2018-02-17 PROBLEM — Z93.0 STATUS POST TRACHEOSTOMY (HCC): Status: ACTIVE | Noted: 2018-02-17

## 2018-02-17 LAB
ALLEN TEST: POSITIVE
ANION GAP SERPL CALCULATED.3IONS-SCNC: 10 MMOL/L (ref 9–17)
BUN BLDV-MCNC: 20 MG/DL (ref 6–20)
BUN/CREAT BLD: ABNORMAL (ref 9–20)
CALCIUM SERPL-MCNC: 8.5 MG/DL (ref 8.6–10.4)
CHLORIDE BLD-SCNC: 103 MMOL/L (ref 98–107)
CO2: 33 MMOL/L (ref 20–31)
CREAT SERPL-MCNC: 0.4 MG/DL (ref 0.5–0.9)
FIO2: 45
GFR AFRICAN AMERICAN: >60 ML/MIN
GFR NON-AFRICAN AMERICAN: >60 ML/MIN
GFR SERPL CREATININE-BSD FRML MDRD: ABNORMAL ML/MIN/{1.73_M2}
GFR SERPL CREATININE-BSD FRML MDRD: ABNORMAL ML/MIN/{1.73_M2}
GLUCOSE BLD-MCNC: 96 MG/DL (ref 70–99)
HCT VFR BLD CALC: 32.2 % (ref 36.3–47.1)
HEMOGLOBIN: 8.8 G/DL (ref 11.9–15.1)
INR BLD: 1.1
MCH RBC QN AUTO: 25.4 PG (ref 25.2–33.5)
MCHC RBC AUTO-ENTMCNC: 27.3 G/DL (ref 28.4–34.8)
MCV RBC AUTO: 92.8 FL (ref 82.6–102.9)
MODE: ABNORMAL
NEGATIVE BASE EXCESS, ART: ABNORMAL (ref 0–2)
NRBC AUTOMATED: 0 PER 100 WBC
O2 DEVICE/FLOW/%: ABNORMAL
PARTIAL THROMBOPLASTIN TIME: 18.4 SEC (ref 20.5–30.5)
PATIENT TEMP: ABNORMAL
PDW BLD-RTO: 17 % (ref 11.8–14.4)
PLATELET # BLD: 215 K/UL (ref 138–453)
PMV BLD AUTO: 11 FL (ref 8.1–13.5)
POC HCO3: 36.5 MMOL/L (ref 21–28)
POC O2 SATURATION: 90 % (ref 94–98)
POC PCO2 TEMP: ABNORMAL MM HG
POC PCO2: 62.6 MM HG (ref 35–48)
POC PH TEMP: ABNORMAL
POC PH: 7.37 (ref 7.35–7.45)
POC PO2 TEMP: ABNORMAL MM HG
POC PO2: 63.4 MM HG (ref 83–108)
POSITIVE BASE EXCESS, ART: 10 (ref 0–3)
POTASSIUM SERPL-SCNC: 3.6 MMOL/L (ref 3.7–5.3)
PROTHROMBIN TIME: 11.4 SEC (ref 9–12)
RBC # BLD: 3.47 M/UL (ref 3.95–5.11)
SAMPLE SITE: ABNORMAL
SODIUM BLD-SCNC: 146 MMOL/L (ref 135–144)
TCO2 (CALC), ART: 39 MMOL/L (ref 22–29)
WBC # BLD: 10.7 K/UL (ref 3.5–11.3)

## 2018-02-17 PROCEDURE — 0B110F4 BYPASS TRACHEA TO CUTANEOUS WITH TRACHEOSTOMY DEVICE, OPEN APPROACH: ICD-10-PCS | Performed by: OTOLARYNGOLOGY

## 2018-02-17 PROCEDURE — 3600000004 HC SURGERY LEVEL 4 BASE: Performed by: OTOLARYNGOLOGY

## 2018-02-17 PROCEDURE — 2580000003 HC RX 258: Performed by: STUDENT IN AN ORGANIZED HEALTH CARE EDUCATION/TRAINING PROGRAM

## 2018-02-17 PROCEDURE — 2000000000 HC ICU R&B

## 2018-02-17 PROCEDURE — 6370000000 HC RX 637 (ALT 250 FOR IP): Performed by: INTERNAL MEDICINE

## 2018-02-17 PROCEDURE — 6370000000 HC RX 637 (ALT 250 FOR IP): Performed by: HOSPITALIST

## 2018-02-17 PROCEDURE — 6360000002 HC RX W HCPCS: Performed by: INTERNAL MEDICINE

## 2018-02-17 PROCEDURE — 6360000002 HC RX W HCPCS: Performed by: NURSE ANESTHETIST, CERTIFIED REGISTERED

## 2018-02-17 PROCEDURE — 6360000002 HC RX W HCPCS: Performed by: STUDENT IN AN ORGANIZED HEALTH CARE EDUCATION/TRAINING PROGRAM

## 2018-02-17 PROCEDURE — 2580000003 HC RX 258: Performed by: INTERNAL MEDICINE

## 2018-02-17 PROCEDURE — 80048 BASIC METABOLIC PNL TOTAL CA: CPT

## 2018-02-17 PROCEDURE — 36415 COLL VENOUS BLD VENIPUNCTURE: CPT

## 2018-02-17 PROCEDURE — 82803 BLOOD GASES ANY COMBINATION: CPT

## 2018-02-17 PROCEDURE — 2500000003 HC RX 250 WO HCPCS: Performed by: NURSE ANESTHETIST, CERTIFIED REGISTERED

## 2018-02-17 PROCEDURE — 0DH63UZ INSERTION OF FEEDING DEVICE INTO STOMACH, PERCUTANEOUS APPROACH: ICD-10-PCS | Performed by: INTERNAL MEDICINE

## 2018-02-17 PROCEDURE — 3700000001 HC ADD 15 MINUTES (ANESTHESIA): Performed by: OTOLARYNGOLOGY

## 2018-02-17 PROCEDURE — 2500000003 HC RX 250 WO HCPCS: Performed by: OTOLARYNGOLOGY

## 2018-02-17 PROCEDURE — 71045 X-RAY EXAM CHEST 1 VIEW: CPT

## 2018-02-17 PROCEDURE — 85730 THROMBOPLASTIN TIME PARTIAL: CPT

## 2018-02-17 PROCEDURE — 94770 HC ETCO2 MONITOR DAILY: CPT

## 2018-02-17 PROCEDURE — 85027 COMPLETE CBC AUTOMATED: CPT

## 2018-02-17 PROCEDURE — 94003 VENT MGMT INPAT SUBQ DAY: CPT

## 2018-02-17 PROCEDURE — 3700000000 HC ANESTHESIA ATTENDED CARE: Performed by: OTOLARYNGOLOGY

## 2018-02-17 PROCEDURE — 99232 SBSQ HOSP IP/OBS MODERATE 35: CPT | Performed by: INTERNAL MEDICINE

## 2018-02-17 PROCEDURE — 3600000014 HC SURGERY LEVEL 4 ADDTL 15MIN: Performed by: OTOLARYNGOLOGY

## 2018-02-17 PROCEDURE — 2580000003 HC RX 258: Performed by: NURSE ANESTHETIST, CERTIFIED REGISTERED

## 2018-02-17 PROCEDURE — A7521 TRACH/LARYN TUBE CUFFED: HCPCS | Performed by: OTOLARYNGOLOGY

## 2018-02-17 PROCEDURE — 94640 AIRWAY INHALATION TREATMENT: CPT

## 2018-02-17 PROCEDURE — 99291 CRITICAL CARE FIRST HOUR: CPT | Performed by: INTERNAL MEDICINE

## 2018-02-17 PROCEDURE — 94762 N-INVAS EAR/PLS OXIMTRY CONT: CPT

## 2018-02-17 PROCEDURE — 85610 PROTHROMBIN TIME: CPT

## 2018-02-17 PROCEDURE — 2720000010 HC SURG SUPPLY STERILE: Performed by: OTOLARYNGOLOGY

## 2018-02-17 PROCEDURE — 2500000003 HC RX 250 WO HCPCS: Performed by: INTERNAL MEDICINE

## 2018-02-17 RX ORDER — BUPIVACAINE HYDROCHLORIDE AND EPINEPHRINE 2.5; 5 MG/ML; UG/ML
INJECTION, SOLUTION EPIDURAL; INFILTRATION; INTRACAUDAL; PERINEURAL PRN
Status: DISCONTINUED | OUTPATIENT
Start: 2018-02-17 | End: 2018-02-17 | Stop reason: HOSPADM

## 2018-02-17 RX ORDER — SODIUM CHLORIDE 9 MG/ML
INJECTION, SOLUTION INTRAVENOUS CONTINUOUS PRN
Status: DISCONTINUED | OUTPATIENT
Start: 2018-02-17 | End: 2018-02-17 | Stop reason: SDUPTHER

## 2018-02-17 RX ORDER — ROCURONIUM BROMIDE 10 MG/ML
INJECTION, SOLUTION INTRAVENOUS PRN
Status: DISCONTINUED | OUTPATIENT
Start: 2018-02-17 | End: 2018-02-17 | Stop reason: SDUPTHER

## 2018-02-17 RX ORDER — FENTANYL CITRATE 50 UG/ML
INJECTION, SOLUTION INTRAMUSCULAR; INTRAVENOUS PRN
Status: DISCONTINUED | OUTPATIENT
Start: 2018-02-17 | End: 2018-02-17 | Stop reason: SDUPTHER

## 2018-02-17 RX ORDER — GLYCOPYRROLATE 0.2 MG/ML
INJECTION INTRAMUSCULAR; INTRAVENOUS PRN
Status: DISCONTINUED | OUTPATIENT
Start: 2018-02-17 | End: 2018-02-17 | Stop reason: SDUPTHER

## 2018-02-17 RX ADMIN — Medication 125 MCG/HR: at 06:35

## 2018-02-17 RX ADMIN — PROPOFOL 10 MCG/KG/MIN: 10 INJECTION, EMULSION INTRAVENOUS at 19:21

## 2018-02-17 RX ADMIN — PROPOFOL 10 MCG/KG/MIN: 10 INJECTION, EMULSION INTRAVENOUS at 02:22

## 2018-02-17 RX ADMIN — ROCURONIUM BROMIDE 50 MG: 10 INJECTION INTRAVENOUS at 12:09

## 2018-02-17 RX ADMIN — Medication 2 G: at 12:04

## 2018-02-17 RX ADMIN — FENTANYL CITRATE 100 MCG: 50 INJECTION INTRAMUSCULAR; INTRAVENOUS at 12:45

## 2018-02-17 RX ADMIN — Medication 10 ML: at 08:37

## 2018-02-17 RX ADMIN — GLYCOPYRROLATE 0.4 MG: 0.2 INJECTION INTRAMUSCULAR; INTRAVENOUS at 12:39

## 2018-02-17 RX ADMIN — Medication 125 MCG/HR: at 22:26

## 2018-02-17 RX ADMIN — ALBUTEROL SULFATE 2.5 MG: 2.5 SOLUTION RESPIRATORY (INHALATION) at 03:17

## 2018-02-17 RX ADMIN — Medication 125 MCG/HR: at 14:30

## 2018-02-17 RX ADMIN — SODIUM CHLORIDE: 9 INJECTION, SOLUTION INTRAVENOUS at 11:51

## 2018-02-17 RX ADMIN — PROPOFOL 10 MCG/KG/MIN: 10 INJECTION, EMULSION INTRAVENOUS at 10:10

## 2018-02-17 RX ADMIN — SODIUM CHLORIDE: 9 INJECTION, SOLUTION INTRAVENOUS at 06:03

## 2018-02-17 RX ADMIN — IPRATROPIUM BROMIDE AND ALBUTEROL SULFATE 1 AMPULE: .5; 3 SOLUTION RESPIRATORY (INHALATION) at 20:11

## 2018-02-17 RX ADMIN — FENTANYL CITRATE 50 MCG: 50 INJECTION INTRAMUSCULAR; INTRAVENOUS at 12:15

## 2018-02-17 RX ADMIN — FENTANYL CITRATE 100 MCG: 50 INJECTION INTRAMUSCULAR; INTRAVENOUS at 12:06

## 2018-02-17 RX ADMIN — ALBUTEROL SULFATE 2.5 MG: 2.5 SOLUTION RESPIRATORY (INHALATION) at 00:06

## 2018-02-17 RX ADMIN — IPRATROPIUM BROMIDE AND ALBUTEROL SULFATE 1 AMPULE: .5; 3 SOLUTION RESPIRATORY (INHALATION) at 16:54

## 2018-02-17 RX ADMIN — HYDRALAZINE HYDROCHLORIDE 50 MG: 50 TABLET, FILM COATED ORAL at 06:03

## 2018-02-17 RX ADMIN — ROCURONIUM BROMIDE 50 MG: 10 INJECTION INTRAVENOUS at 12:39

## 2018-02-17 RX ADMIN — IPRATROPIUM BROMIDE AND ALBUTEROL SULFATE 1 AMPULE: .5; 3 SOLUTION RESPIRATORY (INHALATION) at 07:51

## 2018-02-17 RX ADMIN — ROCURONIUM BROMIDE 50 MG: 10 INJECTION INTRAVENOUS at 11:54

## 2018-02-17 RX ADMIN — SODIUM CHLORIDE 1 MG/HR: 0.9 INJECTION, SOLUTION INTRAVENOUS at 16:59

## 2018-02-17 ASSESSMENT — PULMONARY FUNCTION TESTS
PIF_VALUE: 37
PIF_VALUE: 40
PIF_VALUE: 37
PIF_VALUE: 44
PIF_VALUE: 42
PIF_VALUE: 37
PIF_VALUE: 37
PIF_VALUE: 42
PIF_VALUE: 40
PIF_VALUE: 43
PIF_VALUE: 45
PIF_VALUE: 40
PIF_VALUE: 37
PIF_VALUE: 13
PIF_VALUE: 40
PIF_VALUE: 37
PIF_VALUE: 40
PIF_VALUE: 40
PIF_VALUE: 37
PIF_VALUE: 40
PIF_VALUE: 37
PIF_VALUE: 42
PIF_VALUE: 41
PIF_VALUE: 40
PIF_VALUE: 40
PIF_VALUE: 37
PIF_VALUE: 42
PIF_VALUE: 26
PIF_VALUE: 37
PIF_VALUE: 37
PIF_VALUE: 42
PIF_VALUE: 17
PIF_VALUE: 37
PIF_VALUE: 42
PIF_VALUE: 42
PIF_VALUE: 37
PIF_VALUE: 40
PIF_VALUE: 26
PIF_VALUE: 37
PIF_VALUE: 15
PIF_VALUE: 37
PIF_VALUE: 42
PIF_VALUE: 37
PIF_VALUE: 43
PIF_VALUE: 37
PIF_VALUE: 37
PIF_VALUE: 42
PIF_VALUE: 37
PIF_VALUE: 37
PIF_VALUE: 40
PIF_VALUE: 40
PIF_VALUE: 41
PIF_VALUE: 42
PIF_VALUE: 37
PIF_VALUE: 42
PIF_VALUE: 37
PIF_VALUE: 30
PIF_VALUE: 37
PIF_VALUE: 42
PIF_VALUE: 44
PIF_VALUE: 37
PIF_VALUE: 42
PIF_VALUE: 37
PIF_VALUE: 44
PIF_VALUE: 21
PIF_VALUE: 37
PIF_VALUE: 37
PIF_VALUE: 41
PIF_VALUE: 40
PIF_VALUE: 42
PIF_VALUE: 37
PIF_VALUE: 37
PIF_VALUE: 25
PIF_VALUE: 43
PIF_VALUE: 37
PIF_VALUE: 37
PIF_VALUE: 40
PIF_VALUE: 37
PIF_VALUE: 43

## 2018-02-17 ASSESSMENT — PAIN SCALES - GENERAL
PAINLEVEL_OUTOF10: 3
PAINLEVEL_OUTOF10: 4

## 2018-02-17 ASSESSMENT — COPD QUESTIONNAIRES: CAT_SEVERITY: MODERATE

## 2018-02-17 ASSESSMENT — ENCOUNTER SYMPTOMS: SHORTNESS OF BREATH: 1

## 2018-02-17 NOTE — ANESTHESIA POSTPROCEDURE EVALUATION
Department of Anesthesiology  Postprocedure Note    Patient: Kj Baum  MRN: [id number]  YOB: 1975  Date of evaluation: 2/17/2018  Time:  2:27 PM     Procedure Summary     Date:  02/17/18 Room / Location:  [address] OR    Anesthesia Start:  1151 Anesthesia Stop:  1337    Procedures:       TRACHEOTOMY (N/A Neck)      EGD ESOPHAGOGASTRODUODENOSCOPY PEG TUBE INSERTION - GI UNIT (N/A Abdomen) Diagnosis:  (RESPIRATORY FAILURE, DYSPHAGIA )    Surgeon:  Jaleel Buchanan MD; Christi Cao MD Responsible Provider:  Fortunato Buck MD    Anesthesia Type:  general ASA Status:  4          Anesthesia Type: general    Stanislaw Phase I:      Stanislaw Phase II:      Last vitals: Reviewed and per EMR flowsheets.        Anesthesia Post Evaluation    Patient location during evaluation: ICU  Level of consciousness: responsive to light touch  Pain score: 0  Airway patency: patent  Nausea & Vomiting: no vomiting and no nausea  Complications: no  Cardiovascular status: hemodynamically stable  Respiratory status: acceptable and ventilator  Hydration status: stable

## 2018-02-17 NOTE — PLAN OF CARE
Problem: ABCDS Injury Assessment  Goal: Absence of physical injury  Outcome: Ongoing      Problem: OXYGENATION/RESPIRATORY FUNCTION  Goal: Patient will maintain patent airway  Outcome: Ongoing

## 2018-02-17 NOTE — PLAN OF CARE
Problem: Falls - Risk of  Goal: Absence of falls  Outcome: Ongoing  Patient remains free from falls during this shift. RN will continue with plan of care. Problem: Risk for Impaired Skin Integrity  Goal: Tissue integrity - skin and mucous membranes  Structural intactness and normal physiological function of skin and  mucous membranes.    Outcome: Ongoing      Problem: ABCDS Injury Assessment  Goal: Absence of physical injury  Outcome: Ongoing

## 2018-02-17 NOTE — PROGRESS NOTES
[facility] - Initial Consult Note  Today's Date and Time: 2/17/2018, 2:57 PM  admission date 1/26/2018  Impression :   Current:  · COPD  · Hypertensive crisis  · Acute respiratory failure-Intubation on the vent  · NSTEMI  · Pulmonary edema  · community acquired pneumonia  · ARDS  · Rhinovirus infection  ·     Other:  ·   Discussion    · Severe COPD with morbid obesity and a picture of ARDS, respiratory failure, along with an acute synovitis infection and presumed community acquired pneumonia  · Treated with antibiotics, status post back and pelvic today 2/17/18  · Respiratory condition slowly improving  Recommendations   · At this time. Continuing supportive care,  · Post trach and PEG today 1/17/18, respiratory toilet  · Keep off antibiotics  · Case discussed with nurse    Infection Control Recommendations   · Universal Precautions  ·     Antimicrobial Stewardship Recommendations   · Off antibiotics      Chief complaint/reason for consultation:   Shortness of breath/rhinovirus infection and pneumonia    History of Present Illness:       Interval changes 02/17/18   Stable overnight just for a PEG and trach and return back to the room, secretions are clear, wh  No fever, no chills. Blood pressure stable  Obese abdomen is soft, nontender  Not responding sedated still on the vent  [place] in place  On propofol still    WBC 10.7  Chest x-ray from 2/17/18, increased right perihilar and bibasilar space disease most likely pulmonary edema  chest x-ray from 2/13 with cardiomegaly and bilateral airspace disease  Summary of relevant labs:  Labs:    Micro:  1/26. Blood culture negative  2/4. Blood culture negative  2/4. Urine culture Candida  Imaging:      I have personally reviewed the past medical history, past surgical history, medications, social history, and family history, and I have updated the database accordingly.   Past Medical History:     Past Medical History:   Diagnosis

## 2018-02-17 NOTE — BRIEF OP NOTE
Brief Postoperative Note  ______________________________________________________________    Patient: Latoya Hale  YOB: 1975  MRN: [id number]  Date of Procedure: 2/17/2018    Pre-Op Diagnosis: RESPIRATORY FAILURE, DYSPHAGIA     Post-Op Diagnosis: Same       Procedure(s):  TRACHEOTOMY  EGD ESOPHAGOGASTRODUODENOSCOPY PEG TUBE INSERTION - GI UNIT    Anesthesia: General    Surgeon(s):  Caridad Kanaris, MD Gean Seip, MD    Staff:  Surgical Assistant: Elsa Cannon Assistant: Lee Everett RN  Scrub Person First: Perri Mcdonough     Estimated Blood Loss: * No values recorded between 2/17/2018 11:51 AM and 2/17/2018  1:26 PM * None    Complications: None    Specimens:   * No specimens in log *    Implants:  * No implants in log *      Drains:   Urethral Catheter Temperature probe (Active)   $ Urethral catheter insertion $ Not inserted for procedure 2/15/2018  5:26 PM   Catheter Indications Acute urinary retention/obstruction; Need for fluid management in critically ill patients in a critical care setting not able to be managed by other means such as BSC with hat, bedpan, urinal, condom catheter, or short term intermittent urethral catherization 2/17/2018 11:20 AM   Site Assessment No urethral drainage 2/17/2018 11:20 AM   Urine Color Tia 2/17/2018 11:20 AM   Urine Appearance Sediment 2/17/2018 11:20 AM   Urine Odor Malodorous 2/17/2018 11:20 AM   Output (mL) 35 mL 2/17/2018 11:00 AM       [REMOVED] NG/OG Tube Orogastric 18 fr Center mouth (Removed)   Removed 02/13/18 1352   Surrounding Skin Dry; Intact 2/13/2018  8:00 AM   Dressing Status Clean;Dry; Intact 2/13/2018  8:00 AM   Status Other (Comment) 2/13/2018  8:00 AM   Placement Verified [facility];by Gastric Contents 2/13/2018  8:00 AM   NG/OG Measurement (cm) 65 cm 2/13/2018  8:00 AM   Drainage Appearance Bile;Tan 2/13/2018  4:00 AM   Tube Feeding High Protein 2/13/2018  8:00 AM   Tube Feeding Status Continuous 2/13/2018  8:00 AM Rate/Schedule 40 mL/hr 2/13/2018  8:00 AM   Tube Feeding Intake (mL) 213 ml 2/13/2018  9:12 AM   Free Water Flush (mL) 20 mL 2/12/2018  8:00 PM   Free Water Rate 30 2/11/2018 12:00 PM   Residual Volume (ml) 60 ml 2/13/2018  8:00 AM       [REMOVED] NG/OG Tube Orogastric 16 fr Center mouth (Removed)   Removed 02/17/18 1206   Surrounding Skin Dry; Intact 2/17/2018  4:00 AM   Dressing Status Clean;Dry; Intact 2/17/2018  4:00 AM   Status Clamped 2/17/2018  8:04 AM   Placement Verified [facility];by Gastric Contents 2/17/2018  8:04 AM   NG/OG Measurement (cm) 60 cm 2/17/2018  4:00 AM   Drainage Appearance Brown 2/16/2018  8:30 AM   Tube Feeding High Protein 2/16/2018  8:00 PM   Tube Feeding Status Stopped 2/17/2018 12:00 AM   Rate/Schedule 40 mL/hr 2/16/2018  8:00 PM   Tube Feeding Intake (mL) 115 ml 2/17/2018 12:00 AM   Free Water Flush (mL) 20 mL 2/17/2018  6:16 AM   Residual Volume (ml) 15 ml 2/17/2018 12:00 AM   Output (mL) 50 ml 2/16/2018  8:30 AM       [REMOVED] Rectal Tube With balloon (Removed)   Removed 02/11/18 1730   Stool Appearance Loose 2/11/2018 12:00 PM   Stool Color Brown 2/11/2018 12:00 PM   Stool Amount Small 2/11/2018 12:00 PM   Rectal Tube Output 0 ml 2/11/2018  8:00 AM       [REMOVED] Urethral Catheter 16 fr (Removed)   Removed 02/14/18 1600   Catheter Indications Need for fluid management in critically ill patients in a critical care setting not able to be managed by other means such as BSC with hat, bedpan, urinal, condom catheter, or short term intermittent urethral catherization 2/14/2018  4:00 PM   Securement Device Date Changed 02/07/18 2/14/2018  8:00 AM   Site Assessment Swelling;Urethral drainage 2/14/2018  4:00 PM   Urine Color Yellow 2/14/2018  4:00 PM   Urine Appearance Sediment 2/14/2018  4:00 PM   Urine Odor Malodorous 2/13/2018  4:00 AM   Output (mL) 65 mL 2/14/2018  4:00 PM       Findings: deep trach    Dana Singh MD  Date: 2/17/2018  Time: 1:26 PM

## 2018-02-17 NOTE — ANESTHESIA PRE PROCEDURE
Department of Anesthesiology  Preprocedure Note       Name:  Dwight French   Age:  43 y.o.  :  1975                                          MRN:  [id number]         Date:  2018      Surgeon: Frank Banda):  Rudolpho Self, MD Avel Goodell, MD    Procedure: Procedure(s):  TRACHEOTOMY  EGD ESOPHAGOGASTRODUODENOSCOPY PEG TUBE INSERTION - GI UNIT    Medications prior to admission:   Prior to Admission medications    Medication Sig Start Date End Date Taking? Authorizing Provider   nicotine (NICODERM CQ) 21 MG/24HR Place 1 patch onto the skin every 24 hours    Historical Provider, MD   predniSONE (DELTASONE) 20 MG tablet Take 20 mg by mouth daily 2 tabs daily for 5 days, then 1 tab daily for 5 days    Historical Provider, MD   azithromycin (ZITHROMAX) 250 MG tablet Take 250 mg by mouth daily For 4 doses    Historical Provider, MD   doxycycline hyclate (VIBRA-TABS) 100 MG tablet Take 100 mg by mouth 2 times daily 2 times daily for 10 days    Historical Provider, MD   furosemide (LASIX) 20 MG tablet Take 20 mg by mouth daily    Historical Provider, MD   tiotropium (SPIRIVA) 18 MCG inhalation capsule Inhale 18 mcg into the lungs daily    Historical Provider, MD   albuterol sulfate  (90 Base) MCG/ACT inhaler Inhale 2 puffs into the lungs every 6 hours as needed for Wheezing    Historical Provider, MD   oxyCODONE-acetaminophen (PERCOCET)  MG per tablet Take 1 tablet by mouth every 6 hours as needed for Pain.     Historical Provider, MD   ferrous sulfate 325 (65 Fe) MG tablet Take 325 mg by mouth 2 times daily (with meals)     Historical Provider, MD   amLODIPine (NORVASC) 10 MG tablet Take 10 mg by mouth daily    Historical Provider, MD   azithromycin (ZITHROMAX) 1 g powder Take 1 packet by mouth once    Historical Provider, MD   hydrochlorothiazide (MICROZIDE) 12.5 MG capsule Take 12.5 mg by mouth every morning    Historical Provider, MD   lisinopril (PRINIVIL;ZESTRIL) 20 MG tablet Take 20 mg by Continuous Krystal Hughes MD 6.3 mL/hr at 02/17/18 0635 125 mcg/hr at 02/17/18 0635    naloxone (NARCAN) injection 0.4 mg  0.4 mg Intravenous PRN Krystal Hughes MD        sodium chloride flush 0.9 % injection 10 mL  10 mL Intravenous 2 times per day Krystal Hughes MD   10 mL at 02/17/18 0837    sodium chloride flush 0.9 % injection 10 mL  10 mL Intravenous PRN Krystal Hughes MD        famotidine (PEPCID) tablet 20 mg  20 mg Oral BID Grecia Claros MD   20 mg at 02/16/18 2139    glucose (GLUTOSE) 40 % oral gel 15 g  15 g Oral PRN Diamante Hugo MD        dextrose 50 % solution 12.5 g  12.5 g Intravenous PRN Diamante Hugo MD        glucagon (rDNA) injection 1 mg  1 mg Intramuscular PRN Diamante Hugo MD        dextrose 5 % solution  100 mL/hr Intravenous PRN Diamante Hugo MD        propofol 1000 MG/100ML injection  20 mcg/kg/min Intravenous Titrated Heriberto Adame MD 9.1 mL/hr at 02/17/18 1010 10 mcg/kg/min at 02/17/18 1010    lidocaine viscous (XYLOCAINE) 2 % solution 5 mL  5 mL Mouth/Throat PRN Heriberto Adame MD        magnesium hydroxide (MILK OF MAGNESIA) 400 MG/5ML suspension 30 mL  30 mL Oral Daily PRN Justo Carroll MD        ondansetron [facility]) injection 4 mg  4 mg Intravenous Q6H PRN Justo Carroll MD   4 mg at 01/28/18 2123    potassium chloride (KLOR-CON M) extended release tablet 40 mEq  40 mEq Oral PRN Justo Carroll MD        Or    potassium chloride 20 MEQ/15ML (10%) oral solution 40 mEq  40 mEq Oral PRN Justo Carroll MD   40 mEq at 02/01/18 1702    Or    potassium chloride 10 mEq/100 mL IVPB (Peripheral Line)  10 mEq Intravenous PRN Justo Carroll  mL/hr at 02/01/18 1152 10 mEq at 02/01/18 1152    sodium chloride flush 0.9 % injection 10 mL  10 mL Intravenous 2 times per day Heriberto Adame MD   10 mL at 02/14/18 0904    sodium chloride flush 0.9 % injection 10 mL  10 mL Intravenous PRN Heriberto Adame MD        acetaminophen (TYLENOL) tablet 650 mg  650 mg Oral Q4H PRN Heriberto Adame MD   650 mg at BMI:   Wt Readings from Last 3 Encounters:   02/17/18 (!) 364 lb 3.2 oz (165.2 kg)     Body mass index is 71.13 kg/m². CBC:   Lab Results   Component Value Date    WBC 10.7 02/17/2018    RBC 3.47 02/17/2018    HGB 8.8 02/17/2018    HCT 32.2 02/17/2018    MCV 92.8 02/17/2018    RDW 17.0 02/17/2018     02/17/2018       CMP:   Lab Results   Component Value Date     02/17/2018    K 3.6 02/17/2018     02/17/2018    CO2 33 02/17/2018    BUN 20 02/17/2018    CREATININE 0.40 02/17/2018    GFRAA >60 02/17/2018    LABGLOM >60 02/17/2018    GLUCOSE 96 02/17/2018    PROT 7.6 01/26/2018    CALCIUM 8.5 02/17/2018    BILITOT 0.59 01/26/2018    ALKPHOS 121 01/26/2018    AST 23 01/26/2018    ALT 15 01/26/2018       POC Tests: No results for input(s): POCGLU, POCNA, POCK, POCCL, POCBUN, POCHEMO, POCHCT in the last 72 hours.     Coags:   Lab Results   Component Value Date    PROTIME 11.4 02/17/2018    INR 1.1 02/17/2018    APTT 18.4 02/17/2018       HCG (If Applicable): No results found for: PREGTESTUR, PREGSERUM, HCG, HCGQUANT     ABGs: No results found for: PHART, PO2ART, IVZ1NLU, PIJ2PRN, BEART, S2CNZKKL     Type & Screen (If Applicable):  No results found for: LABABO, LABRH    Anesthesia Evaluation   no history of anesthetic complications:   Airway:        Comment: Orally intubated and vented   Dental:          Pulmonary:normal exam    (+) pneumonia: unresolved,  COPD: moderate,  shortness of breath: new,                             Cardiovascular:    (+) hypertension: moderate, past MI: no interval change,       ECG reviewed  Rhythm: regular  Rate: normal                    Neuro/Psych:   Negative Neuro/Psych ROS              GI/Hepatic/Renal: Neg GI/Hepatic/Renal ROS            Endo/Other: Negative Endo/Other ROS                    Abdominal:   (+) obese,         Vascular:                                        Anesthesia Plan      general     ASA 4       Induction:

## 2018-02-18 ENCOUNTER — APPOINTMENT (OUTPATIENT)
Dept: CT IMAGING | Age: 43
DRG: 005 | End: 2018-02-18
Payer: MEDICARE

## 2018-02-18 LAB
ALLEN TEST: POSITIVE
ANION GAP SERPL CALCULATED.3IONS-SCNC: 12 MMOL/L (ref 9–17)
BUN BLDV-MCNC: 19 MG/DL (ref 6–20)
BUN/CREAT BLD: ABNORMAL (ref 9–20)
CALCIUM SERPL-MCNC: 8.5 MG/DL (ref 8.6–10.4)
CHLORIDE BLD-SCNC: 103 MMOL/L (ref 98–107)
CO2: 31 MMOL/L (ref 20–31)
CREAT SERPL-MCNC: 0.38 MG/DL (ref 0.5–0.9)
FIO2: 50
GFR AFRICAN AMERICAN: >60 ML/MIN
GFR NON-AFRICAN AMERICAN: >60 ML/MIN
GFR SERPL CREATININE-BSD FRML MDRD: ABNORMAL ML/MIN/{1.73_M2}
GFR SERPL CREATININE-BSD FRML MDRD: ABNORMAL ML/MIN/{1.73_M2}
GLUCOSE BLD-MCNC: 97 MG/DL (ref 70–99)
HCT VFR BLD CALC: 31.9 % (ref 36.3–47.1)
HEMOGLOBIN: 8.9 G/DL (ref 11.9–15.1)
MCH RBC QN AUTO: 25.7 PG (ref 25.2–33.5)
MCHC RBC AUTO-ENTMCNC: 27.9 G/DL (ref 28.4–34.8)
MCV RBC AUTO: 92.2 FL (ref 82.6–102.9)
MODE: ABNORMAL
NEGATIVE BASE EXCESS, ART: ABNORMAL (ref 0–2)
NRBC AUTOMATED: 0 PER 100 WBC
O2 DEVICE/FLOW/%: ABNORMAL
PATIENT TEMP: ABNORMAL
PDW BLD-RTO: 17.2 % (ref 11.8–14.4)
PLATELET # BLD: 228 K/UL (ref 138–453)
PMV BLD AUTO: 11.1 FL (ref 8.1–13.5)
POC HCO3: 37 MMOL/L (ref 21–28)
POC O2 SATURATION: 92 % (ref 94–98)
POC PCO2 TEMP: ABNORMAL MM HG
POC PCO2: 58.9 MM HG (ref 35–48)
POC PH TEMP: ABNORMAL
POC PH: 7.41 (ref 7.35–7.45)
POC PO2 TEMP: ABNORMAL MM HG
POC PO2: 66.6 MM HG (ref 83–108)
POSITIVE BASE EXCESS, ART: 11 (ref 0–3)
POTASSIUM SERPL-SCNC: 3.6 MMOL/L (ref 3.7–5.3)
RBC # BLD: 3.46 M/UL (ref 3.95–5.11)
SAMPLE SITE: ABNORMAL
SODIUM BLD-SCNC: 146 MMOL/L (ref 135–144)
TCO2 (CALC), ART: 39 MMOL/L (ref 22–29)
WBC # BLD: 11.7 K/UL (ref 3.5–11.3)

## 2018-02-18 PROCEDURE — 94770 HC ETCO2 MONITOR DAILY: CPT

## 2018-02-18 PROCEDURE — 2500000003 HC RX 250 WO HCPCS: Performed by: INTERNAL MEDICINE

## 2018-02-18 PROCEDURE — 6370000000 HC RX 637 (ALT 250 FOR IP): Performed by: EMERGENCY MEDICINE

## 2018-02-18 PROCEDURE — 2580000003 HC RX 258: Performed by: STUDENT IN AN ORGANIZED HEALTH CARE EDUCATION/TRAINING PROGRAM

## 2018-02-18 PROCEDURE — 36415 COLL VENOUS BLD VENIPUNCTURE: CPT

## 2018-02-18 PROCEDURE — 94640 AIRWAY INHALATION TREATMENT: CPT

## 2018-02-18 PROCEDURE — 6370000000 HC RX 637 (ALT 250 FOR IP): Performed by: INTERNAL MEDICINE

## 2018-02-18 PROCEDURE — 6360000002 HC RX W HCPCS: Performed by: EMERGENCY MEDICINE

## 2018-02-18 PROCEDURE — 70450 CT HEAD/BRAIN W/O DYE: CPT

## 2018-02-18 PROCEDURE — 6360000002 HC RX W HCPCS: Performed by: INTERNAL MEDICINE

## 2018-02-18 PROCEDURE — 99232 SBSQ HOSP IP/OBS MODERATE 35: CPT | Performed by: INTERNAL MEDICINE

## 2018-02-18 PROCEDURE — 82803 BLOOD GASES ANY COMBINATION: CPT

## 2018-02-18 PROCEDURE — 85027 COMPLETE CBC AUTOMATED: CPT

## 2018-02-18 PROCEDURE — 6360000002 HC RX W HCPCS: Performed by: STUDENT IN AN ORGANIZED HEALTH CARE EDUCATION/TRAINING PROGRAM

## 2018-02-18 PROCEDURE — 94003 VENT MGMT INPAT SUBQ DAY: CPT

## 2018-02-18 PROCEDURE — 80048 BASIC METABOLIC PNL TOTAL CA: CPT

## 2018-02-18 PROCEDURE — 2580000003 HC RX 258: Performed by: INTERNAL MEDICINE

## 2018-02-18 PROCEDURE — 6370000000 HC RX 637 (ALT 250 FOR IP): Performed by: HOSPITALIST

## 2018-02-18 PROCEDURE — 36600 WITHDRAWAL OF ARTERIAL BLOOD: CPT

## 2018-02-18 PROCEDURE — 2000000000 HC ICU R&B

## 2018-02-18 PROCEDURE — 94762 N-INVAS EAR/PLS OXIMTRY CONT: CPT

## 2018-02-18 PROCEDURE — 99291 CRITICAL CARE FIRST HOUR: CPT | Performed by: INTERNAL MEDICINE

## 2018-02-18 PROCEDURE — 6370000000 HC RX 637 (ALT 250 FOR IP): Performed by: STUDENT IN AN ORGANIZED HEALTH CARE EDUCATION/TRAINING PROGRAM

## 2018-02-18 RX ORDER — FENTANYL CITRATE 50 UG/ML
75 INJECTION, SOLUTION INTRAMUSCULAR; INTRAVENOUS
Status: DISCONTINUED | OUTPATIENT
Start: 2018-02-18 | End: 2018-02-20

## 2018-02-18 RX ORDER — AMLODIPINE BESYLATE 10 MG/1
10 TABLET ORAL DAILY
Status: DISCONTINUED | OUTPATIENT
Start: 2018-02-19 | End: 2018-02-23 | Stop reason: HOSPADM

## 2018-02-18 RX ORDER — FENTANYL CITRATE 50 UG/ML
50 INJECTION, SOLUTION INTRAMUSCULAR; INTRAVENOUS
Status: DISCONTINUED | OUTPATIENT
Start: 2018-02-18 | End: 2018-02-20

## 2018-02-18 RX ORDER — FLUTICASONE PROPIONATE 50 MCG
1 SPRAY, SUSPENSION (ML) NASAL DAILY
Status: DISCONTINUED | OUTPATIENT
Start: 2018-02-18 | End: 2018-02-23 | Stop reason: HOSPADM

## 2018-02-18 RX ORDER — CLONIDINE HYDROCHLORIDE 0.1 MG/1
0.1 TABLET ORAL 3 TIMES DAILY PRN
Status: DISCONTINUED | OUTPATIENT
Start: 2018-02-18 | End: 2018-02-23 | Stop reason: HOSPADM

## 2018-02-18 RX ORDER — ACETAMINOPHEN 160 MG
TABLET,DISINTEGRATING ORAL
Status: COMPLETED
Start: 2018-02-18 | End: 2018-02-19

## 2018-02-18 RX ORDER — OXYCODONE HYDROCHLORIDE 5 MG/1
5 TABLET ORAL EVERY 6 HOURS
Status: DISCONTINUED | OUTPATIENT
Start: 2018-02-18 | End: 2018-02-22

## 2018-02-18 RX ORDER — HEPARIN SODIUM 5000 [USP'U]/ML
5000 INJECTION, SOLUTION INTRAVENOUS; SUBCUTANEOUS EVERY 8 HOURS SCHEDULED
Status: DISCONTINUED | OUTPATIENT
Start: 2018-02-18 | End: 2018-02-23 | Stop reason: HOSPADM

## 2018-02-18 RX ADMIN — FENTANYL CITRATE 75 MCG: 50 INJECTION INTRAMUSCULAR; INTRAVENOUS at 18:13

## 2018-02-18 RX ADMIN — IPRATROPIUM BROMIDE AND ALBUTEROL SULFATE 1 AMPULE: .5; 3 SOLUTION RESPIRATORY (INHALATION) at 16:00

## 2018-02-18 RX ADMIN — OXYCODONE HYDROCHLORIDE 5 MG: 5 TABLET ORAL at 14:18

## 2018-02-18 RX ADMIN — FENTANYL CITRATE 75 MCG: 50 INJECTION INTRAMUSCULAR; INTRAVENOUS at 21:20

## 2018-02-18 RX ADMIN — Medication 10 ML: at 19:38

## 2018-02-18 RX ADMIN — LISINOPRIL 10 MG: 10 TABLET ORAL at 07:47

## 2018-02-18 RX ADMIN — FLUTICASONE PROPIONATE 1 SPRAY: 50 SPRAY, METERED NASAL at 15:01

## 2018-02-18 RX ADMIN — IPRATROPIUM BROMIDE AND ALBUTEROL SULFATE 1 AMPULE: .5; 3 SOLUTION RESPIRATORY (INHALATION) at 08:23

## 2018-02-18 RX ADMIN — SODIUM CHLORIDE: 9 INJECTION, SOLUTION INTRAVENOUS at 08:52

## 2018-02-18 RX ADMIN — ALBUTEROL SULFATE 2.5 MG: 2.5 SOLUTION RESPIRATORY (INHALATION) at 00:11

## 2018-02-18 RX ADMIN — IPRATROPIUM BROMIDE AND ALBUTEROL SULFATE 1 AMPULE: .5; 3 SOLUTION RESPIRATORY (INHALATION) at 19:25

## 2018-02-18 RX ADMIN — OXYCODONE HYDROCHLORIDE 5 MG: 5 TABLET ORAL at 07:53

## 2018-02-18 RX ADMIN — Medication 125 MCG/HR: at 07:24

## 2018-02-18 RX ADMIN — Medication 10 ML: at 19:35

## 2018-02-18 RX ADMIN — OXYCODONE HYDROCHLORIDE 5 MG: 5 TABLET ORAL at 19:49

## 2018-02-18 RX ADMIN — DESMOPRESSIN ACETATE 40 MG: 0.2 TABLET ORAL at 19:36

## 2018-02-18 RX ADMIN — Medication 10 ML: at 07:47

## 2018-02-18 RX ADMIN — HYDRALAZINE HYDROCHLORIDE 50 MG: 50 TABLET, FILM COATED ORAL at 14:18

## 2018-02-18 RX ADMIN — SODIUM CHLORIDE 10 MG/HR: 0.9 INJECTION, SOLUTION INTRAVENOUS at 02:57

## 2018-02-18 RX ADMIN — PROPOFOL 10 MCG/KG/MIN: 10 INJECTION, EMULSION INTRAVENOUS at 06:04

## 2018-02-18 RX ADMIN — ALBUTEROL SULFATE 2.5 MG: 2.5 SOLUTION RESPIRATORY (INHALATION) at 23:18

## 2018-02-18 RX ADMIN — FENTANYL CITRATE 75 MCG: 50 INJECTION INTRAMUSCULAR; INTRAVENOUS at 23:56

## 2018-02-18 RX ADMIN — HYDRALAZINE HYDROCHLORIDE 50 MG: 50 TABLET, FILM COATED ORAL at 21:21

## 2018-02-18 RX ADMIN — IPRATROPIUM BROMIDE AND ALBUTEROL SULFATE 1 AMPULE: .5; 3 SOLUTION RESPIRATORY (INHALATION) at 12:32

## 2018-02-18 RX ADMIN — HEPARIN SODIUM 5000 UNITS: 5000 INJECTION, SOLUTION INTRAVENOUS; SUBCUTANEOUS at 21:24

## 2018-02-18 RX ADMIN — FAMOTIDINE 20 MG: 20 TABLET, FILM COATED ORAL at 07:47

## 2018-02-18 RX ADMIN — ALBUTEROL SULFATE 2.5 MG: 2.5 SOLUTION RESPIRATORY (INHALATION) at 03:29

## 2018-02-18 RX ADMIN — HEPARIN SODIUM 5000 UNITS: 5000 INJECTION, SOLUTION INTRAVENOUS; SUBCUTANEOUS at 14:18

## 2018-02-18 RX ADMIN — ACETAMINOPHEN 650 MG: 325 TABLET ORAL at 18:07

## 2018-02-18 RX ADMIN — FAMOTIDINE 20 MG: 20 TABLET, FILM COATED ORAL at 20:02

## 2018-02-18 ASSESSMENT — PULMONARY FUNCTION TESTS
PIF_VALUE: 26
PIF_VALUE: 23
PIF_VALUE: 23
PIF_VALUE: 22
PIF_VALUE: 26

## 2018-02-18 NOTE — PLAN OF CARE
Problem: Pain:  Goal: Pain level will decrease  Pain level will decrease    Outcome: Ongoing  See MAR  Goal: Control of acute pain  Control of acute pain   Outcome: Ongoing      Problem: Falls - Risk of  Goal: Absence of falls  Outcome: Ongoing      Problem: Risk for Impaired Skin Integrity  Goal: Tissue integrity - skin and mucous membranes  Structural intactness and normal physiological function of skin and  mucous membranes.    Outcome: Ongoing      Problem: Pain:  Goal: Control of acute pain  Control of acute pain   Outcome: Ongoing    Goal: Control of chronic pain  Control of chronic pain   Outcome: Ongoing    Goal: Pain level will decrease  Pain level will decrease    Outcome: Ongoing  See MAR    Problem: OXYGENATION/RESPIRATORY FUNCTION  Goal: Patient will maintain patent airway  Outcome: Ongoing    Goal: Patient will achieve/maintain normal respiratory rate/effort  Respiratory rate and effort will be within normal limits for the patient   Outcome: Ongoing      Problem: SKIN INTEGRITY  Goal: Skin integrity is maintained or improved  Outcome: Ongoing

## 2018-02-18 NOTE — PLAN OF CARE
Problem: Anxiety/Stress:  Goal: Level of anxiety will decrease  Level of anxiety will decrease   Outcome: Ongoing      Problem: Gas Exchange - Impaired:  Goal: Levels of oxygenation will improve  Levels of oxygenation will improve   Outcome: Ongoing      Problem: Pain:  Goal: Pain level will decrease  Pain level will decrease    Outcome: Ongoing      Problem: Falls - Risk of  Goal: Absence of falls  Outcome: Ongoing      Problem: Risk for Impaired Skin Integrity  Goal: Tissue integrity - skin and mucous membranes  Structural intactness and normal physiological function of skin and  mucous membranes.    Outcome: Ongoing      Problem: Pain:  Goal: Pain level will decrease  Pain level will decrease    Outcome: Ongoing      Problem: Nutrition  Goal: Optimal nutrition therapy  Outcome: Ongoing      Problem: ABCDS Injury Assessment  Goal: Absence of physical injury  Outcome: Ongoing      Problem: Neurological  Goal: Maximum potential motor/sensory/cognitive function  Outcome: Ongoing      Problem: OXYGENATION/RESPIRATORY FUNCTION  Goal: Patient will maintain patent airway  Outcome: Ongoing    Goal: Patient will achieve/maintain normal respiratory rate/effort  Respiratory rate and effort will be within normal limits for the patient   Outcome: Ongoing      Problem: MECHANICAL VENTILATION  Goal: Oral health is maintained or improved  Outcome: Ongoing    Goal: ET tube will be managed safely  Outcome: Ongoing    Goal: Ability to express needs and understand communication  Outcome: Ongoing      Problem: Infection - Central Venous Catheter-Associated Bloodstream Infection:  Goal: Will show no infection signs and symptoms  Will show no infection signs and symptoms     Outcome: Ongoing      Problem: Skin Integrity:  Goal: Will show no infection signs and symptoms  Will show no infection signs and symptoms     Outcome: Ongoing

## 2018-02-18 NOTE — PROGRESS NOTES
INTENSIVE CARE UNIT  Resident Physician Progress Note    Patient - Delvis Martinez  Date of Admission -  2018  8:09 PM  Date of Evaluation -  2018  Room and Bed Number -  0105/0105-01   Hospital Day - 23  Cc ards     SUBJECTIVE:     OVERNIGHT EVENTS:      S/P Trach and PEG     TODAY:    Afebrile    Normotensive   UO good   Creatinine 0.3     AWAKE & FOLLOWING COMMANDS:  [] No   [x] Yes    SECRETIONS Amount:  [x] Small [] Moderate  [] Large  [] None  Color:     [] White [] Colored  [] Bloody    SEDATION:  RAAS Score:  [] Propofol gtt  [] Versed gtt  [] Ativan gtt   [x] No Sedation    PARALYZED:  [x] No    [] Yes    VASOPRESSORS:  [x] No    [] Yes  [] Levophed [] Dopamine [] Vasopressin  [] Dobutamine [] Phenylephrine [] Epinephrine    Ros unable to perform due to trach      OBJECTIVE:     VITAL SIGNS:  BP (!) 141/48   Pulse 99   Temp 99.1 °F (37.3 °C) (Core)   Resp 19   Ht 5' (1.524 m)   Wt (!) 364 lb 3.2 oz (165.2 kg)   SpO2 100%   BMI 71.13 kg/m²   Tmax over 24 hours:  Temp (24hrs), Av.5 °F (36.4 °C), Min:96.5 °F (35.8 °C), Max:99.1 °F (37.3 °C)      Patient Vitals for the past 8 hrs:   BP Temp Temp src Pulse Resp SpO2   18 0700 (!) 141/48 - - 99 19 100 %   18 0615 (!) 121/42 - - 65 23 100 %   18 0600 (!) 123/43 - - 80 20 100 %   18 0545 (!) 123/40 - - - - -   18 0530 (!) 126/42 - - - - -   18 0515 (!) 139/51 - - - - -   18 0500 (!) 128/99 - - 58 23 98 %   18 0445 (!) 141/58 - - - - -   18 0430 (!) 137/47 - - - - -   18 0415 (!) 152/57 - - - 21 98 %   18 0400 (!) 140/50 99.1 °F (37.3 °C) CORE 65 17 99 %   18 0345 (!) 125/42 - - - - -   18 0330 132/62 - - - - -   18 0315 (!) 131/53 - - - - -   18 0300 (!) 127/49 - - 61 17 98 %   18 0245 (!) 140/41 - - - - -   18 0230 (!) 140/62 - - - - -   18 0215 (!) 146/49 - - - - -   18 0200 (!) 141/49 - - 71 20 100 %   18 0145 (!) 132/54 - Negative Base Excess, Art NOT REPORTED    Positive Base Excess, Art 11 (H)    POC O2 SAT 92 (L) %    O2 Device/Flow/% Adult Ventilator    Aubrey Test POSITIVE    Sample Site Left Radial Artery    Mode PRVC    FIO2 50.0       ABGs:   Arterial Blood Gas result:  pH 7.3; pCO2 62; pO2 63; HCO3 36;  Lab Results   Component Value Date    [id number] 39 02/18/2018    FIO2 50.0 02/18/2018         DATA:  Complete Blood Count:   Recent Labs      02/16/18   0407  02/17/18   0501  02/18/18   0351   WBC  11.0  10.7  11.7*   RBC  3.69*  3.47*  3.46*   HGB  9.5*  8.8*  8.9*   HCT  33.3*  32.2*  31.9*   MCV  90.2  92.8  92.2   MCH  25.7  25.4  25.7   MCHC  28.5  27.3*  27.9*   RDW  16.9*  17.0*  17.2*   PLT  241  215  228   MPV  11.0  11.0  11.1        Last 3 Blood Glucose:   Recent Labs      02/16/18   0407  02/17/18   0501  02/18/18   0351   GLUCOSE  121*  96  97        PT/INR:    Lab Results   Component Value Date    PROTIME 11.4 02/17/2018    INR 1.1 02/17/2018     PTT:    Lab Results   Component Value Date    APTT 18.4 02/17/2018       Comprehensive Metabolic Profile:   Recent Labs      02/16/18   0407  02/17/18   0501  02/18/18   0351   NA  143  146*  146*   K  3.7  3.6*  3.6*   CL  102  103  103   CO2  31  33*  31   BUN  20  20  19   CREATININE  0.45*  0.40*  0.38*   GLUCOSE  121*  96  97   CALCIUM  8.4*  8.5*  8.5*      Magnesium:   Lab Results   Component Value Date    MG 1.7 02/12/2018    MG 2.2 02/10/2018    MG 2.3 02/06/2018     Phosphorus:   Lab Results   Component Value Date    PHOS 4.7 02/10/2018    PHOS 3.1 02/06/2018    PHOS 5.8 02/06/2018     Ionized Calcium:   Lab Results   Component Value Date    CAION 1.15 02/06/2018    CAION 1.12 02/05/2018    CAION 1.22 02/02/2018        Urinalysis:   Lab Results   Component Value Date    NITRU NEGATIVE 01/26/2018    COLORU YELLOW 01/26/2018    PHUR 6.5 01/26/2018    WBCUA 2 TO 5 01/26/2018    RBCUA 0 TO 2 01/26/2018    MUCUS 1+ 01/26/2018    TRICHOMONAS NOT REPORTED 01/26/2018 YEAST NOT REPORTED 01/26/2018    BACTERIA NOT REPORTED 01/26/2018    SPECGRAV 1.013 01/26/2018    LEUKOCYTESUR NEGATIVE 01/26/2018    UROBILINOGEN Normal 01/26/2018    BILIRUBINUR NEGATIVE 01/26/2018    GLUCOSEU NEGATIVE 01/26/2018    KETUA TRACE 01/26/2018    AMORPHOUS NOT REPORTED 01/26/2018       HgBA1c:    Lab Results   Component Value Date    LABA1C 5.2 01/27/2018     TSH:    Lab Results   Component Value Date    TSH 1.23 01/27/2018     Lactic Acid: No results found for: LACTA   Troponin: No results for input(s): TROPONINI in the last 72 hours. Other Labs:        Radiology/Imaging:  CXR 2/13/18    FINDINGS:   Support tubes and lines are unchanged.  The cardiac silhouette and   mediastinal contours are stable.  There are bilateral lung infiltrates, not   appreciably changed.  This is likely related to pulmonary edema and/or   pneumonia.  No pneumothorax.  The visualized osseous structures are   unremarkable.         CT head without contrast 2/18/18  Impression:     No acute intracranial abnormality.  Sinus and mastoid disease as described.          ASSESSMENT:     Patient Active Problem List    Diagnosis Date Noted    Status post tracheostomy ([address] 02/17/2018    Status post insertion of percutaneous endoscopic gastrostomy (PEG) tube ([address] 02/17/2018    Rhinovirus infection     Encounter for PEG (percutaneous endoscopic gastrostomy) ([address]     Fever     Disease due to rhinovirus     ARDS (adult respiratory distress syndrome) ([address] 02/02/2018    COPD exacerbation ([address]     NSTEMI (non-ST elevated myocardial infarction) ([address] 01/27/2018    Acute pulmonary edema ([address] 01/27/2018    Hypertensive emergency 01/27/2018    Acute respiratory failure with hypoxia and hypercapnia ([address] 01/27/2018    Smoker 01/27/2018    Morbid obesity ([address] 01/27/2018    Elevated troponin 01/27/2018    Obesity hypoventilation syndrome ([address] 01/27/2018    SOB (shortness of breath) 01/27/2018    Pneumonia of both including imaging and labs, discussions with other team members and physicians at least 27   Min so far today, excluding procedures. Treatment plan Discussed with nursing staff in detail , all questions answered . Ben Flores MD   2/18/2018   5:56 PM    Please note that this chart was generated using voice recognition Dragon dictation software. Although every effort was made to ensure the accuracy of this automated transcription, some errors in transcription may have occurred.

## 2018-02-18 NOTE — PROGRESS NOTES
THE [facility] AT [facility] Progress Note    Td Avila is a 43 y.o. female patient. Hospitalization Day:23      Chief consult reason:   Peg placement  Subjective:  Pt seen and examined. Peg in proper position, has not withdrawn or advanced, hub turns freely, BS hypoactive. Abdominal binder on. Slight amount of drainage at site-but no bleeding. Pt withdrawals in pain-she is on a PCA pump  VITALS:  BP (!) 155/66   Pulse 113   Temp 98.8 °F (37.1 °C) (Core)   Resp 21   Ht 5' (1.524 m)   Wt (!) 364 lb 3.2 oz (165.2 kg)   SpO2 100%   BMI 71.13 kg/m²   TEMPERATURE:  Current - Temp: 98.8 °F (37.1 °C); Max - Temp  Av.5 °F (36.4 °C)  Min: 96.5 °F (35.8 °C)  Max: 99.1 °F (37.3 °C)    Physical Assessment:  General appearance:  alert, in slight pain distress  Mental Status:  Unable to assess-trached  Lungs:  Dm in bases  Heart:  regular rate and rhythm, no murmur  Abdomen:  Soft,slightly tender, nondistended, hypoactive bowel sounds, very scant amount of drainage at peg site but no bleeding  Extremities:  +2 BLE edema, redness, tenderness in the calves  Skin:  no gross lesions, rashes, induration    Data Review:  LABS and IMAGING:     CBC  Recent Labs      18   0407  18   0501  18   0351   WBC  11.0  10.7  11.7*   HGB  9.5*  8.8*  8.9*   MCV  90.2  92.8  92.2   RDW  16.9*  17.0*  17.2*   PLT  241  215  228       ANEMIA STUDIES  No results for input(s): LABIRON, TIBC, FERRITIN, AIJEEXLR28, FOLATE, OCCULTBLD in the last 72 hours. BMP  Recent Labs      18   0407  18   0501  18   0351   NA  143  146*  146*   K  3.7  3.6*  3.6*   CL  102  103  103   CO2  31  33*  31   BUN  20  20  19   CREATININE  0.45*  0.40*  0.38*   GLUCOSE  121*  96  97   CALCIUM  8.4*  8.5*  8.5*       LFTS  No results for input(s): ALKPHOS, ALT, AST, BILITOT, BILIDIR, LABALBU in the last 72 hours. AMYLASE/LIPASE/AMMONIA  No results for input(s):  AMYLASE, LIPASE, AMMONIA in the last 72 hours.    PT/INR  Recent Labs      02/17/18   0501   PROTIME  11.4   INR  1.1     Lactic acid:Invalid input(s): LACTIC ACID   Radiology Review:        ASSESSMENT and Plan:  1. S/P Peg placement   -Wound care daily   -May start TF at 25 ml and advance to goal    -Keep abdominal binder on to prevent self removal of peg   -Elevate HOB to prevent aspiration   -Cont with PPI. Avoid NSAIDs    GI will s/o at this time. Thank you for allowing me to participate in the care of your patient. Please feel free to contact me with any questions or concerns. Olinda Kelly, [address] [facility]  [phone]    GI Attending Note (Caitie Bowie)    Patient was seen and examined by the bedside this morning along with the GI team.  Agree with the above assessment and plan. Patient remains hemodynamic is stable and afebrile. Patient is status post PEG tube/tracheostomy placement yesterday. PEG site noted-no bleeding/induration/signs of infection observed. The abdomen is soft, bowel sounds were heard. The bumper is freely rotatable. The bumper noted at 5 cm. Initiate tube feeding at a low rate and advance as tolerated. Anti-reflex precautions  Regular wound care. Continue binder when the tube is not in use. Continue PPI, avoid NSAIDs. Please recall GI in case of any questions/concerns or acute change in clinical status.     BI

## 2018-02-19 ENCOUNTER — APPOINTMENT (OUTPATIENT)
Dept: GENERAL RADIOLOGY | Age: 43
DRG: 005 | End: 2018-02-19
Payer: MEDICARE

## 2018-02-19 LAB
ALLEN TEST: POSITIVE
ANION GAP SERPL CALCULATED.3IONS-SCNC: 8 MMOL/L (ref 9–17)
BUN BLDV-MCNC: 18 MG/DL (ref 6–20)
BUN/CREAT BLD: ABNORMAL (ref 9–20)
CALCIUM SERPL-MCNC: 8.1 MG/DL (ref 8.6–10.4)
CHLORIDE BLD-SCNC: 104 MMOL/L (ref 98–107)
CO2: 33 MMOL/L (ref 20–31)
CREAT SERPL-MCNC: 0.39 MG/DL (ref 0.5–0.9)
FIO2: 45
GFR AFRICAN AMERICAN: >60 ML/MIN
GFR NON-AFRICAN AMERICAN: >60 ML/MIN
GFR SERPL CREATININE-BSD FRML MDRD: ABNORMAL ML/MIN/{1.73_M2}
GFR SERPL CREATININE-BSD FRML MDRD: ABNORMAL ML/MIN/{1.73_M2}
GLUCOSE BLD-MCNC: 106 MG/DL (ref 70–99)
HCT VFR BLD CALC: 28.4 % (ref 36.3–47.1)
HEMOGLOBIN: 8 G/DL (ref 11.9–15.1)
MCH RBC QN AUTO: 25.8 PG (ref 25.2–33.5)
MCHC RBC AUTO-ENTMCNC: 28.2 G/DL (ref 28.4–34.8)
MCV RBC AUTO: 91.6 FL (ref 82.6–102.9)
MODE: ABNORMAL
NEGATIVE BASE EXCESS, ART: ABNORMAL (ref 0–2)
NRBC AUTOMATED: 0 PER 100 WBC
O2 DEVICE/FLOW/%: ABNORMAL
PATIENT TEMP: 37.9
PDW BLD-RTO: 17.2 % (ref 11.8–14.4)
PLATELET # BLD: 235 K/UL (ref 138–453)
PMV BLD AUTO: 10.9 FL (ref 8.1–13.5)
POC HCO3: 36.6 MMOL/L (ref 21–28)
POC O2 SATURATION: 96 % (ref 94–98)
POC PCO2 TEMP: 63 MM HG
POC PCO2: 60.6 MM HG (ref 35–48)
POC PH TEMP: 7.38
POC PH: 7.39 (ref 7.35–7.45)
POC PO2 TEMP: 88 MM HG
POC PO2: 83 MM HG (ref 83–108)
POSITIVE BASE EXCESS, ART: 10 (ref 0–3)
POTASSIUM SERPL-SCNC: 3.4 MMOL/L (ref 3.7–5.3)
RBC # BLD: 3.1 M/UL (ref 3.95–5.11)
SAMPLE SITE: ABNORMAL
SODIUM BLD-SCNC: 145 MMOL/L (ref 135–144)
TCO2 (CALC), ART: 38 MMOL/L (ref 22–29)
WBC # BLD: 7.5 K/UL (ref 3.5–11.3)

## 2018-02-19 PROCEDURE — 94640 AIRWAY INHALATION TREATMENT: CPT

## 2018-02-19 PROCEDURE — 82803 BLOOD GASES ANY COMBINATION: CPT

## 2018-02-19 PROCEDURE — 87205 SMEAR GRAM STAIN: CPT

## 2018-02-19 PROCEDURE — 6370000000 HC RX 637 (ALT 250 FOR IP): Performed by: HOSPITALIST

## 2018-02-19 PROCEDURE — 94762 N-INVAS EAR/PLS OXIMTRY CONT: CPT

## 2018-02-19 PROCEDURE — 71045 X-RAY EXAM CHEST 1 VIEW: CPT

## 2018-02-19 PROCEDURE — 87186 SC STD MICRODIL/AGAR DIL: CPT

## 2018-02-19 PROCEDURE — 80048 BASIC METABOLIC PNL TOTAL CA: CPT

## 2018-02-19 PROCEDURE — 6370000000 HC RX 637 (ALT 250 FOR IP): Performed by: STUDENT IN AN ORGANIZED HEALTH CARE EDUCATION/TRAINING PROGRAM

## 2018-02-19 PROCEDURE — 99291 CRITICAL CARE FIRST HOUR: CPT | Performed by: INTERNAL MEDICINE

## 2018-02-19 PROCEDURE — 6370000000 HC RX 637 (ALT 250 FOR IP): Performed by: INTERNAL MEDICINE

## 2018-02-19 PROCEDURE — 97110 THERAPEUTIC EXERCISES: CPT

## 2018-02-19 PROCEDURE — 36415 COLL VENOUS BLD VENIPUNCTURE: CPT

## 2018-02-19 PROCEDURE — 99233 SBSQ HOSP IP/OBS HIGH 50: CPT | Performed by: INTERNAL MEDICINE

## 2018-02-19 PROCEDURE — 6360000002 HC RX W HCPCS: Performed by: INTERNAL MEDICINE

## 2018-02-19 PROCEDURE — 87077 CULTURE AEROBIC IDENTIFY: CPT

## 2018-02-19 PROCEDURE — 2580000003 HC RX 258: Performed by: INTERNAL MEDICINE

## 2018-02-19 PROCEDURE — 85027 COMPLETE CBC AUTOMATED: CPT

## 2018-02-19 PROCEDURE — 94770 HC ETCO2 MONITOR DAILY: CPT

## 2018-02-19 PROCEDURE — 6370000000 HC RX 637 (ALT 250 FOR IP): Performed by: EMERGENCY MEDICINE

## 2018-02-19 PROCEDURE — 87070 CULTURE OTHR SPECIMN AEROBIC: CPT

## 2018-02-19 PROCEDURE — 6360000002 HC RX W HCPCS: Performed by: EMERGENCY MEDICINE

## 2018-02-19 PROCEDURE — 6360000002 HC RX W HCPCS

## 2018-02-19 PROCEDURE — 36600 WITHDRAWAL OF ARTERIAL BLOOD: CPT

## 2018-02-19 PROCEDURE — 94003 VENT MGMT INPAT SUBQ DAY: CPT

## 2018-02-19 PROCEDURE — 2580000003 HC RX 258: Performed by: STUDENT IN AN ORGANIZED HEALTH CARE EDUCATION/TRAINING PROGRAM

## 2018-02-19 PROCEDURE — 2000000000 HC ICU R&B

## 2018-02-19 PROCEDURE — 2500000003 HC RX 250 WO HCPCS: Performed by: STUDENT IN AN ORGANIZED HEALTH CARE EDUCATION/TRAINING PROGRAM

## 2018-02-19 PROCEDURE — 2500000003 HC RX 250 WO HCPCS: Performed by: INTERNAL MEDICINE

## 2018-02-19 RX ORDER — ENALAPRILAT 2.5 MG/2ML
1.25 INJECTION INTRAVENOUS ONCE
Status: COMPLETED | OUTPATIENT
Start: 2018-02-19 | End: 2018-02-19

## 2018-02-19 RX ORDER — HYDRALAZINE HYDROCHLORIDE 20 MG/ML
20 INJECTION INTRAMUSCULAR; INTRAVENOUS EVERY 6 HOURS PRN
Status: DISCONTINUED | OUTPATIENT
Start: 2018-02-19 | End: 2018-02-19

## 2018-02-19 RX ORDER — LORAZEPAM 2 MG/ML
2 INJECTION INTRAMUSCULAR ONCE
Status: COMPLETED | OUTPATIENT
Start: 2018-02-19 | End: 2018-02-19

## 2018-02-19 RX ORDER — LORAZEPAM 2 MG/ML
INJECTION INTRAMUSCULAR
Status: COMPLETED
Start: 2018-02-19 | End: 2018-02-19

## 2018-02-19 RX ORDER — GLYCOPYRROLATE 0.2 MG/ML
0.1 INJECTION INTRAMUSCULAR; INTRAVENOUS 3 TIMES DAILY
Status: DISCONTINUED | OUTPATIENT
Start: 2018-02-19 | End: 2018-02-22

## 2018-02-19 RX ORDER — LORAZEPAM 2 MG/ML
2 INJECTION INTRAMUSCULAR EVERY 4 HOURS PRN
Status: DISCONTINUED | OUTPATIENT
Start: 2018-02-19 | End: 2018-02-23 | Stop reason: HOSPADM

## 2018-02-19 RX ORDER — HYDRALAZINE HYDROCHLORIDE 20 MG/ML
20 INJECTION INTRAMUSCULAR; INTRAVENOUS ONCE
Status: COMPLETED | OUTPATIENT
Start: 2018-02-19 | End: 2018-02-19

## 2018-02-19 RX ORDER — FUROSEMIDE 20 MG/1
20 TABLET ORAL DAILY
Status: DISCONTINUED | OUTPATIENT
Start: 2018-02-19 | End: 2018-02-21

## 2018-02-19 RX ORDER — GLYCOPYRROLATE 0.2 MG/ML
0.1 INJECTION INTRAMUSCULAR; INTRAVENOUS 4 TIMES DAILY
Status: DISCONTINUED | OUTPATIENT
Start: 2018-02-19 | End: 2018-02-19

## 2018-02-19 RX ORDER — HYDRALAZINE HYDROCHLORIDE 20 MG/ML
INJECTION INTRAMUSCULAR; INTRAVENOUS
Status: COMPLETED
Start: 2018-02-19 | End: 2018-02-19

## 2018-02-19 RX ORDER — LORAZEPAM 1 MG/1
1 TABLET ORAL ONCE
Status: COMPLETED | OUTPATIENT
Start: 2018-02-19 | End: 2018-02-19

## 2018-02-19 RX ADMIN — AMLODIPINE BESYLATE 10 MG: 10 TABLET ORAL at 08:24

## 2018-02-19 RX ADMIN — HYDRALAZINE HYDROCHLORIDE 75 MG: 50 TABLET, FILM COATED ORAL at 15:16

## 2018-02-19 RX ADMIN — ENALAPRILAT 1.25 MG: 1.25 INJECTION INTRAVENOUS at 09:43

## 2018-02-19 RX ADMIN — FENTANYL CITRATE 75 MCG: 50 INJECTION INTRAMUSCULAR; INTRAVENOUS at 08:36

## 2018-02-19 RX ADMIN — SODIUM CHLORIDE: 9 INJECTION, SOLUTION INTRAVENOUS at 00:10

## 2018-02-19 RX ADMIN — IPRATROPIUM BROMIDE AND ALBUTEROL SULFATE 1 AMPULE: .5; 3 SOLUTION RESPIRATORY (INHALATION) at 19:49

## 2018-02-19 RX ADMIN — FENTANYL CITRATE 75 MCG: 50 INJECTION INTRAMUSCULAR; INTRAVENOUS at 05:35

## 2018-02-19 RX ADMIN — FAMOTIDINE 20 MG: 20 TABLET, FILM COATED ORAL at 20:43

## 2018-02-19 RX ADMIN — ALBUTEROL SULFATE 2.5 MG: 2.5 SOLUTION RESPIRATORY (INHALATION) at 23:58

## 2018-02-19 RX ADMIN — LISINOPRIL 10 MG: 10 TABLET ORAL at 08:24

## 2018-02-19 RX ADMIN — HEPARIN SODIUM 5000 UNITS: 5000 INJECTION, SOLUTION INTRAVENOUS; SUBCUTANEOUS at 15:16

## 2018-02-19 RX ADMIN — POTASSIUM CHLORIDE 40 MEQ: 20 TABLET, EXTENDED RELEASE ORAL at 04:57

## 2018-02-19 RX ADMIN — FLUTICASONE PROPIONATE 1 SPRAY: 50 SPRAY, METERED NASAL at 08:24

## 2018-02-19 RX ADMIN — CLONIDINE HYDROCHLORIDE 0.1 MG: 0.1 TABLET ORAL at 08:24

## 2018-02-19 RX ADMIN — CLONIDINE HYDROCHLORIDE 0.1 MG: 0.1 TABLET ORAL at 00:11

## 2018-02-19 RX ADMIN — DESMOPRESSIN ACETATE 40 MG: 0.2 TABLET ORAL at 20:43

## 2018-02-19 RX ADMIN — HYDRALAZINE HYDROCHLORIDE 20 MG: 20 INJECTION INTRAMUSCULAR; INTRAVENOUS at 10:48

## 2018-02-19 RX ADMIN — LORAZEPAM 2 MG: 2 INJECTION INTRAMUSCULAR; INTRAVENOUS at 12:21

## 2018-02-19 RX ADMIN — HEPARIN SODIUM 5000 UNITS: 5000 INJECTION, SOLUTION INTRAVENOUS; SUBCUTANEOUS at 21:57

## 2018-02-19 RX ADMIN — GLYCOPYRROLATE 0.1 MG: 0.2 INJECTION INTRAMUSCULAR; INTRAVENOUS at 20:43

## 2018-02-19 RX ADMIN — OXYCODONE HYDROCHLORIDE 5 MG: 5 TABLET ORAL at 15:24

## 2018-02-19 RX ADMIN — HYDRALAZINE HYDROCHLORIDE 75 MG: 50 TABLET, FILM COATED ORAL at 21:57

## 2018-02-19 RX ADMIN — OXYCODONE HYDROCHLORIDE 5 MG: 5 TABLET ORAL at 20:43

## 2018-02-19 RX ADMIN — IPRATROPIUM BROMIDE AND ALBUTEROL SULFATE 1 AMPULE: .5; 3 SOLUTION RESPIRATORY (INHALATION) at 15:28

## 2018-02-19 RX ADMIN — ACETAMINOPHEN 650 MG: 325 TABLET ORAL at 00:04

## 2018-02-19 RX ADMIN — Medication 10 ML: at 08:24

## 2018-02-19 RX ADMIN — IPRATROPIUM BROMIDE AND ALBUTEROL SULFATE 1 AMPULE: .5; 3 SOLUTION RESPIRATORY (INHALATION) at 12:02

## 2018-02-19 RX ADMIN — Medication: at 00:07

## 2018-02-19 RX ADMIN — LORAZEPAM 2 MG: 2 INJECTION INTRAMUSCULAR; INTRAVENOUS at 12:04

## 2018-02-19 RX ADMIN — FENTANYL CITRATE 75 MCG: 50 INJECTION INTRAMUSCULAR; INTRAVENOUS at 12:02

## 2018-02-19 RX ADMIN — HEPARIN SODIUM 5000 UNITS: 5000 INJECTION, SOLUTION INTRAVENOUS; SUBCUTANEOUS at 05:15

## 2018-02-19 RX ADMIN — FUROSEMIDE 20 MG: 20 TABLET ORAL at 15:24

## 2018-02-19 RX ADMIN — Medication 10 ML: at 20:41

## 2018-02-19 RX ADMIN — OXYCODONE HYDROCHLORIDE 5 MG: 5 TABLET ORAL at 08:28

## 2018-02-19 RX ADMIN — HYDRALAZINE HYDROCHLORIDE 20 MG: 20 INJECTION INTRAMUSCULAR; INTRAVENOUS at 12:25

## 2018-02-19 RX ADMIN — FAMOTIDINE 20 MG: 20 TABLET, FILM COATED ORAL at 08:24

## 2018-02-19 RX ADMIN — HYDRALAZINE HYDROCHLORIDE 50 MG: 50 TABLET, FILM COATED ORAL at 05:15

## 2018-02-19 RX ADMIN — IPRATROPIUM BROMIDE AND ALBUTEROL SULFATE 1 AMPULE: .5; 3 SOLUTION RESPIRATORY (INHALATION) at 07:29

## 2018-02-19 RX ADMIN — ALBUTEROL SULFATE 2.5 MG: 2.5 SOLUTION RESPIRATORY (INHALATION) at 03:52

## 2018-02-19 RX ADMIN — OXYCODONE HYDROCHLORIDE 5 MG: 5 TABLET ORAL at 01:58

## 2018-02-19 RX ADMIN — LORAZEPAM 1 MG: 1 TABLET ORAL at 01:58

## 2018-02-19 RX ADMIN — GLYCOPYRROLATE 0.1 MG: 0.2 INJECTION INTRAMUSCULAR; INTRAVENOUS at 15:16

## 2018-02-19 RX ADMIN — LORAZEPAM 2 MG: 2 INJECTION INTRAMUSCULAR at 12:04

## 2018-02-19 ASSESSMENT — PULMONARY FUNCTION TESTS
PIF_VALUE: 24
PIF_VALUE: 16
PIF_VALUE: 36
PIF_VALUE: 18
PIF_VALUE: 25
PIF_VALUE: 18

## 2018-02-19 NOTE — PROGRESS NOTES
HCO3 36.6 (H) mmol/L    TCO2 (calc), Art 38 (H) mmol/L    Negative Base Excess, Art NOT REPORTED    Positive Base Excess, Art 10 (H)    POC O2 SAT 96 %    O2 Device/Flow/% Adult Ventilator    Aubrey Test POSITIVE    Sample Site Right Radial Artery    Mode PRVC    FIO2 45.0    Pt Temp 37.9    POC pH Temp 7.38    POC pCO2 Temp 63 mm Hg    POC pO2 Temp 88        Arterial Blood Gas result:  pH 7.3; pCO2 62; pO2 63; HCO3 36;  Lab Results   Component Value Date    XCR6XVU 38 02/19/2018    FIO2 45.0 02/19/2018         DATA:  Complete Blood Count:   Recent Labs      02/17/18   0501  02/18/18   0351  02/19/18   0342   WBC  10.7  11.7*  7.5   RBC  3.47*  3.46*  3.10*   HGB  8.8*  8.9*  8.0*   HCT  32.2*  31.9*  28.4*   MCV  92.8  92.2  91.6   MCH  25.4  25.7  25.8   MCHC  27.3*  27.9*  28.2*   RDW  17.0*  17.2*  17.2*   PLT  215  228  235   MPV  11.0  11.1  10.9        Last 3 Blood Glucose:   Recent Labs      02/17/18   0501  02/18/18   0351  02/19/18   0342   GLUCOSE  96  97  106*        PT/INR:    Lab Results   Component Value Date    PROTIME 11.4 02/17/2018    INR 1.1 02/17/2018     PTT:    Lab Results   Component Value Date    APTT 18.4 02/17/2018       Comprehensive Metabolic Profile:   Recent Labs      02/17/18   0501  02/18/18   0351  02/19/18   0342   NA  146*  146*  145*   K  3.6*  3.6*  3.4*   CL  103  103  104   CO2  33*  31  33*   BUN  20 19  18   CREATININE  0.40*  0.38*  0.39*   GLUCOSE  96  97  106*   CALCIUM  8.5*  8.5*  8.1*      Magnesium:   Lab Results   Component Value Date    MG 1.7 02/12/2018    MG 2.2 02/10/2018    MG 2.3 02/06/2018     Phosphorus:   Lab Results   Component Value Date    PHOS 4.7 02/10/2018    PHOS 3.1 02/06/2018    PHOS 5.8 02/06/2018     Ionized Calcium:   Lab Results   Component Value Date    CAION 1.15 02/06/2018    CAION 1.12 02/05/2018    CAION 1.22 02/02/2018        Urinalysis:   Lab Results   Component Value Date    NITRU NEGATIVE 01/26/2018    COLORU YELLOW 01/26/2018    PHUR 6.5 01/26/2018    WBCUA 2 TO 5 01/26/2018    RBCUA 0 TO 2 01/26/2018    MUCUS 1+ 01/26/2018    TRICHOMONAS NOT REPORTED 01/26/2018    YEAST NOT REPORTED 01/26/2018    BACTERIA NOT REPORTED 01/26/2018    SPECGRAV 1.013 01/26/2018    LEUKOCYTESUR NEGATIVE 01/26/2018    UROBILINOGEN Normal 01/26/2018    BILIRUBINUR NEGATIVE 01/26/2018    GLUCOSEU NEGATIVE 01/26/2018    KETUA TRACE 01/26/2018    AMORPHOUS NOT REPORTED 01/26/2018       HgBA1c:    Lab Results   Component Value Date    LABA1C 5.2 01/27/2018     TSH:    Lab Results   Component Value Date    TSH 1.23 01/27/2018     Lactic Acid: No results found for: LACTA   Troponin: No results for input(s): TROPONINI in the last 72 hours. Other Labs:        Radiology/Imaging:  CXR 2/13/18    FINDINGS:   Support tubes and lines are unchanged.  The cardiac silhouette and   mediastinal contours are stable.  There are bilateral lung infiltrates, not   appreciably changed.  This is likely related to pulmonary edema and/or   pneumonia.  No pneumothorax.  The visualized osseous structures are   unremarkable.         CT head without contrast 2/18/18  Impression:     No acute intracranial abnormality.  Sinus and mastoid disease as described.          ASSESSMENT:     Patient Active Problem List    Diagnosis Date Noted    Acute non-recurrent pansinusitis     Status post tracheostomy ([address] 02/17/2018    Status post insertion of percutaneous endoscopic gastrostomy (PEG) tube ([address] 02/17/2018    Rhinovirus infection     Encounter for PEG (percutaneous endoscopic gastrostomy) ([address]     Fever     Disease due to rhinovirus     ARDS (adult respiratory distress syndrome) ([address] 02/02/2018    COPD exacerbation ([address]     NSTEMI (non-ST elevated myocardial infarction) ([address] 01/27/2018    Acute pulmonary edema ([address] 01/27/2018    Hypertensive emergency 01/27/2018    Acute respiratory failure with hypoxia and hypercapnia ([address] 01/27/2018    Smoker 01/27/2018    Morbid obesity

## 2018-02-19 NOTE — PROGRESS NOTES
Results   Component Value Date    MUCUS 1+ 01/26/2018    RBC 3.46 02/18/2018    TRICHOMONAS NOT REPORTED 01/26/2018    WBC 11.7 02/18/2018    YEAST NOT REPORTED 01/26/2018    TURBIDITY CLEAR 01/26/2018     Lab Results   Component Value Date    CREATININE 0.38 02/18/2018    GLUCOSE 97 02/18/2018       Detailed results: Thank you for allowing us to participate in the care of this patient. Please call with questions.     Mozell Sandhoff, MD  Office: [phone]

## 2018-02-19 NOTE — PROGRESS NOTES
BRONCHOSPASM/BRONCHOCONSTRICTION     [x]         IMPROVE AERATION/BREATH SOUNDS  [x]   ADMINISTER BRONCHODILATOR THERAPY AS APPROPRIATE  [x]   ASSESS BREATH SOUNDS  [x]   IMPLEMENT AEROSOL/MDI PROTOCOL  [x]   PATIENT EDUCATION AS NEEDED        MECHANICAL VENTILATION     [x]  PROVIDE OPTIMAL VENTILATION  [x]   ASSESS FOR EXTUBATION READINESS  [x]   ASSESS FOR WEANING READINESS  [x]  EXTUBATE AS TOLERATED  [x]  IMPLEMENT ADULT MECHANICAL VENTILATION PROTOCOL  [x]  MAINTAIN ADEQUATE OXYGENATION  [x]  PERFORM SPONTANEOUS WEANING TRIAL AS TOLERATED

## 2018-02-19 NOTE — PROGRESS NOTES
Nutrition Assessment    Type and Reason for Visit: Reassess    Nutrition Recommendations: Suggest switching TF formula to Jevity 1.2 (std with fiber) goal rate 60 mL/hr to provide 1728 kcal and 80 gm protein. Monitor TF tolerance/adequacy of intakes - modify as needed to appropriately meet needs. Monitor loose stools. Malnutrition Assessment:  · Malnutrition Status: No malnutrition    Nutrition Diagnosis:   · Problem: Inadequate oral intake  · Etiology: related to Impaired respiratory function-inability to consume food     Signs and symptoms:  as evidenced by NPO status due to medical condition, Nutrition support - EN    Nutrition Assessment:  · Subjective Assessment: S/p PEG and Trach placement. TF running at goal rate with minimal residuals. Propofol has been discontinued. RN states pt is having lots of loose stools. · Nutrition-Focused Physical Findings: +Active bowel sounds. +2/+3 generalized, +3 bilateral UE, and +2 bilateral LE edema. · Wound Type: Skin Tears  · Current Nutrition Therapies:  · Oral Diet Orders: NPO   · Oral Diet intake: NPO  · Tube Feeding (TF) Orders:   · Feeding Route: Orogastric  · Formula: Low Calorie, High Protein  · Rate (ml/hr):40 mL/hr    · Volume (ml/day): 960 mL/day  · Duration: Continuous 24hrs  · TF Residuals: Less than or equal to 250ml  · Water Flushes: Per Nursing Protocol  · Current TF & Flush Orders Provides: 960 kcal, 84 gm protein  · Goal TF & Flush Orders Provides: Jevity 1.2 (std with fiber) at 60 mL/hr = 1728 kcal, 80 gm protein  · Additional Calories: Discontinued.   · Anthropometric Measures:  · Ht: 5' (152.4 cm)   · Current Body Wt: 367 lb 11.6 oz (166.8 kg)  · Admission Body Wt: 338 lb (153.3 kg)  · Ideal Body Wt: 100 lb (45.4 kg), % [place] Body 338% using admission wt for calculation   · Adjusted Body Wt: 176 lb (79.8 kg), body weight adjusted for Obesity  · BMI Classification: BMI > or equal to 40.0 Obese Class III  · Comparative Standards

## 2018-02-19 NOTE — PROGRESS NOTES
labs:  Labs:  WBC 7.5  Micro:  1/26. Blood culture negative  2/4. Blood culture negative  2/4. Urine culture Candida  2/19 Sputum culture pending    Imaging:  CT Head without contrast done 2/18/2018 showing pansinusitis:  SINUSES: Diffuse opacification of the bilateral sphenoid sinuses.  Moderate  to advanced mucosal thickening of the bilateral ethmoid and left maxillary  sinuses.  Bilateral opacification the mastoid air cells which may relate to  inflammation or eustachian tube dysfunction. Discussed with patient, RN, CC. .    I have personally reviewed the past medical history, past surgical history, medications, social history, and family history, and I have updated the database accordingly. Past Medical History:     Past Medical History:   Diagnosis Date    COPD (chronic obstructive pulmonary disease) ([facility]        Past Surgical  History:     Past Surgical History:   Procedure Laterality Date    HC CATH POWER PICC TRIPLE  2/2/2018         MD OFFICE/OUTPT VISIT,PROCEDURE ONLY N/A 2/17/2018    TR[facility]OT[facility]Y performed by [facility] at [facility]       Medications:      Glycopyrrolate  0.1 mg Intravenous TID    [START ON 2/20/2018] lisinopril  15 mg Oral Daily    hydrALAZINE  75 mg Oral 3 times per day    furosemide  20 mg Oral Daily    oxyCODONE  5 mg Oral Q6H    fluticasone  1 spray Each Nare Daily    heparin (porcine)  5,000 Units Subcutaneous 3 times per day    amLODIPine  10 mg Oral Daily    ipratropium-albuterol  1 ampule Inhalation 4x daily    albuterol  2.5 mg Nebulization BID    nicotine  1 patch Transdermal Daily    sodium chloride flush  10 mL Intravenous 2 times per day    famotidine  20 mg Oral BID    atorvastatin  40 mg Oral Nightly       Social History:     Social History     Social History    Marital status: Single     Spouse name: N/A    Number of children: N/A    Years of education: N/A     Occupational History    Not on file.      Social History Main Topics    abdomen with a new PEG,   Musculoskeletal: She exhibits edema. She exhibits no deformity. Morbid obesity   Neurological: She is alert. Skin: Skin is warm and dry. She is not diaphoretic. No erythema. Psychiatric: Affect normal.   Alert on the vent     Medical Decision Making -Laboratory:   I have independently reviewed/ordered the following labs:    CBC with Differential:   Recent Labs      02/18/18   0351  02/19/18   0342   WBC  11.7*  7.5   HGB  8.9*  8.0*   HCT  31.9*  28.4*   PLT  228  235     BMP:   Recent Labs      02/18/18   0351  02/19/18   0342   NA  146*  145*   K  3.6*  3.4*   CL  103  104   CO2  31  33*   BUN  19  18   CREATININE  0.38*  0.39*     Hepatic Function Panel: No results for input(s): PROT, LABALBU, BILIDIR, IBILI, BILITOT, ALKPHOS, ALT, AST in the last 72 hours. No results for input(s): RPR in the last 72 hours. No results for input(s): HIV in the last 72 hours. No results for input(s): BC in the last 72 hours. Lab Results   Component Value Date    MUCUS 1+ 01/26/2018    RBC 3.10 02/19/2018    TRICHOMONAS NOT REPORTED 01/26/2018    WBC 7.5 02/19/2018    YEAST NOT REPORTED 01/26/2018    TURBIDITY CLEAR 01/26/2018     Lab Results   Component Value Date    CREATININE 0.39 02/19/2018    GLUCOSE 106 02/19/2018       Medical Decision Making-Imaging:     Narrative   EXAMINATION:   CT OF THE HEAD WITHOUT CONTRAST  2/18/2018 12:18 pm       TECHNIQUE:   CT of the head was performed without the administration of intravenous   contrast. Dose modulation, iterative reconstruction, and/or weight based   adjustment of the mA/kV was utilized to reduce the radiation dose to as low   as reasonably achievable.       COMPARISON:   None.       HISTORY:   ORDERING SYSTEM PROVIDED HISTORY: eval stroke   TECHNOLOGIST PROVIDED HISTORY:   Has a \"code stroke\" or \"stroke alert\" been called? ->No       FINDINGS:   BRAIN/VENTRICLES: Posterior fossa assessment limited streak artifact.  There   is no acute intracranial hemorrhage, mass effect or midline shift.  No   abnormal extra-axial fluid collection.  The gray-white differentiation is   maintained without evidence of an acute infarct.  There is no evidence of   hydrocephalus.       ORBITS: The visualized portion of the orbits demonstrate no acute abnormality.       SINUSES: Diffuse opacification of the bilateral sphenoid sinuses.  Moderate   to advanced mucosal thickening of the bilateral ethmoid and left maxillary   sinuses.  Bilateral opacification the mastoid air cells which may relate to   inflammation or eustachian tube dysfunction.       SOFT TISSUES/SKULL:  No acute abnormality of the visualized skull or soft   tissues.           Impression   No acute intracranial abnormality.  Sinus and mastoid disease as described. Thank you for allowing us to participate in the care of this patient. Please call with questions.     Steven Miranda MD  Pager: [phone] - Office: [phone]

## 2018-02-19 NOTE — PLAN OF CARE
Problem: OXYGENATION/RESPIRATORY FUNCTION  Goal: Patient will maintain patent airway  Outcome: Ongoing    Goal: Patient will achieve/maintain normal respiratory rate/effort  Respiratory rate and effort will be within normal limits for the patient   Outcome: Ongoing      Problem: MECHANICAL VENTILATION  Goal: Patient will maintain patent airway  Outcome: Ongoing    Goal: Oral health is maintained or improved  Outcome: Ongoing    Goal: ET tube will be managed safely  Outcome: Completed Date Met: 02/18/18    Goal: Ability to express needs and understand communication  Outcome: Ongoing    Goal: Tracheostomy will be managed safely  Outcome: Ongoing      Problem: SKIN INTEGRITY  Goal: Skin integrity is maintained or improved  Outcome: Ongoing

## 2018-02-20 LAB
ANION GAP SERPL CALCULATED.3IONS-SCNC: 12 MMOL/L (ref 9–17)
BUN BLDV-MCNC: 16 MG/DL (ref 6–20)
BUN/CREAT BLD: ABNORMAL (ref 9–20)
CALCIUM SERPL-MCNC: 8 MG/DL (ref 8.6–10.4)
CHLORIDE BLD-SCNC: 101 MMOL/L (ref 98–107)
CO2: 27 MMOL/L (ref 20–31)
CREAT SERPL-MCNC: 0.35 MG/DL (ref 0.5–0.9)
GFR AFRICAN AMERICAN: >60 ML/MIN
GFR NON-AFRICAN AMERICAN: >60 ML/MIN
GFR SERPL CREATININE-BSD FRML MDRD: ABNORMAL ML/MIN/{1.73_M2}
GFR SERPL CREATININE-BSD FRML MDRD: ABNORMAL ML/MIN/{1.73_M2}
GLUCOSE BLD-MCNC: 126 MG/DL (ref 70–99)
HCT VFR BLD CALC: 33.5 % (ref 36.3–47.1)
HEMOGLOBIN: 9.5 G/DL (ref 11.9–15.1)
MCH RBC QN AUTO: 26.5 PG (ref 25.2–33.5)
MCHC RBC AUTO-ENTMCNC: 28.4 G/DL (ref 28.4–34.8)
MCV RBC AUTO: 93.3 FL (ref 82.6–102.9)
NRBC AUTOMATED: 0 PER 100 WBC
PDW BLD-RTO: 18.1 % (ref 11.8–14.4)
PLATELET # BLD: 189 K/UL (ref 138–453)
PMV BLD AUTO: 11.6 FL (ref 8.1–13.5)
POTASSIUM SERPL-SCNC: 4.1 MMOL/L (ref 3.7–5.3)
RBC # BLD: 3.59 M/UL (ref 3.95–5.11)
SODIUM BLD-SCNC: 140 MMOL/L (ref 135–144)
WBC # BLD: 7.4 K/UL (ref 3.5–11.3)

## 2018-02-20 PROCEDURE — 6360000002 HC RX W HCPCS: Performed by: INTERNAL MEDICINE

## 2018-02-20 PROCEDURE — 36415 COLL VENOUS BLD VENIPUNCTURE: CPT

## 2018-02-20 PROCEDURE — 80048 BASIC METABOLIC PNL TOTAL CA: CPT

## 2018-02-20 PROCEDURE — 6370000000 HC RX 637 (ALT 250 FOR IP): Performed by: INTERNAL MEDICINE

## 2018-02-20 PROCEDURE — 99291 CRITICAL CARE FIRST HOUR: CPT | Performed by: INTERNAL MEDICINE

## 2018-02-20 PROCEDURE — 94762 N-INVAS EAR/PLS OXIMTRY CONT: CPT

## 2018-02-20 PROCEDURE — 6370000000 HC RX 637 (ALT 250 FOR IP): Performed by: STUDENT IN AN ORGANIZED HEALTH CARE EDUCATION/TRAINING PROGRAM

## 2018-02-20 PROCEDURE — 94003 VENT MGMT INPAT SUBQ DAY: CPT

## 2018-02-20 PROCEDURE — 6370000000 HC RX 637 (ALT 250 FOR IP): Performed by: EMERGENCY MEDICINE

## 2018-02-20 PROCEDURE — 2000000000 HC ICU R&B

## 2018-02-20 PROCEDURE — 2580000003 HC RX 258: Performed by: STUDENT IN AN ORGANIZED HEALTH CARE EDUCATION/TRAINING PROGRAM

## 2018-02-20 PROCEDURE — 6360000002 HC RX W HCPCS: Performed by: EMERGENCY MEDICINE

## 2018-02-20 PROCEDURE — 94770 HC ETCO2 MONITOR DAILY: CPT

## 2018-02-20 PROCEDURE — 6360000002 HC RX W HCPCS: Performed by: STUDENT IN AN ORGANIZED HEALTH CARE EDUCATION/TRAINING PROGRAM

## 2018-02-20 PROCEDURE — 2580000003 HC RX 258: Performed by: INTERNAL MEDICINE

## 2018-02-20 PROCEDURE — 94640 AIRWAY INHALATION TREATMENT: CPT

## 2018-02-20 PROCEDURE — 99233 SBSQ HOSP IP/OBS HIGH 50: CPT | Performed by: INTERNAL MEDICINE

## 2018-02-20 PROCEDURE — 6370000000 HC RX 637 (ALT 250 FOR IP): Performed by: HOSPITALIST

## 2018-02-20 PROCEDURE — 85027 COMPLETE CBC AUTOMATED: CPT

## 2018-02-20 PROCEDURE — 2500000003 HC RX 250 WO HCPCS: Performed by: STUDENT IN AN ORGANIZED HEALTH CARE EDUCATION/TRAINING PROGRAM

## 2018-02-20 RX ORDER — DIAZEPAM 5 MG/1
5 TABLET ORAL EVERY 6 HOURS
Status: DISCONTINUED | OUTPATIENT
Start: 2018-02-20 | End: 2018-02-20

## 2018-02-20 RX ORDER — DIAZEPAM 5 MG/1
5 TABLET ORAL 3 TIMES DAILY
Status: DISCONTINUED | OUTPATIENT
Start: 2018-02-20 | End: 2018-02-21

## 2018-02-20 RX ADMIN — FUROSEMIDE 20 MG: 20 TABLET ORAL at 11:54

## 2018-02-20 RX ADMIN — HEPARIN SODIUM 5000 UNITS: 5000 INJECTION, SOLUTION INTRAVENOUS; SUBCUTANEOUS at 05:31

## 2018-02-20 RX ADMIN — GLYCOPYRROLATE 0.1 MG: 0.2 INJECTION INTRAMUSCULAR; INTRAVENOUS at 20:48

## 2018-02-20 RX ADMIN — LORAZEPAM 2 MG: 2 INJECTION INTRAMUSCULAR; INTRAVENOUS at 17:09

## 2018-02-20 RX ADMIN — HYDRALAZINE HYDROCHLORIDE 75 MG: 50 TABLET, FILM COATED ORAL at 05:31

## 2018-02-20 RX ADMIN — OXYCODONE HYDROCHLORIDE 5 MG: 5 TABLET ORAL at 03:01

## 2018-02-20 RX ADMIN — FAMOTIDINE 20 MG: 20 TABLET, FILM COATED ORAL at 11:51

## 2018-02-20 RX ADMIN — GLYCOPYRROLATE 0.1 MG: 0.2 INJECTION INTRAMUSCULAR; INTRAVENOUS at 11:51

## 2018-02-20 RX ADMIN — LORAZEPAM 2 MG: 2 INJECTION INTRAMUSCULAR; INTRAVENOUS at 22:18

## 2018-02-20 RX ADMIN — HEPARIN SODIUM 5000 UNITS: 5000 INJECTION, SOLUTION INTRAVENOUS; SUBCUTANEOUS at 15:49

## 2018-02-20 RX ADMIN — DIAZEPAM 5 MG: 5 TABLET ORAL at 15:48

## 2018-02-20 RX ADMIN — IPRATROPIUM BROMIDE AND ALBUTEROL SULFATE 1 AMPULE: .5; 3 SOLUTION RESPIRATORY (INHALATION) at 08:11

## 2018-02-20 RX ADMIN — IPRATROPIUM BROMIDE AND ALBUTEROL SULFATE 1 AMPULE: .5; 3 SOLUTION RESPIRATORY (INHALATION) at 15:42

## 2018-02-20 RX ADMIN — IPRATROPIUM BROMIDE AND ALBUTEROL SULFATE 1 AMPULE: .5; 3 SOLUTION RESPIRATORY (INHALATION) at 19:32

## 2018-02-20 RX ADMIN — FLUTICASONE PROPIONATE 1 SPRAY: 50 SPRAY, METERED NASAL at 11:53

## 2018-02-20 RX ADMIN — LISINOPRIL 15 MG: 10 TABLET ORAL at 11:52

## 2018-02-20 RX ADMIN — ACETAMINOPHEN 650 MG: 325 TABLET ORAL at 21:47

## 2018-02-20 RX ADMIN — CLONIDINE HYDROCHLORIDE 0.1 MG: 0.1 TABLET ORAL at 03:19

## 2018-02-20 RX ADMIN — CLONIDINE HYDROCHLORIDE 0.1 MG: 0.1 TABLET ORAL at 17:09

## 2018-02-20 RX ADMIN — GLYCOPYRROLATE 0.1 MG: 0.2 INJECTION INTRAMUSCULAR; INTRAVENOUS at 15:49

## 2018-02-20 RX ADMIN — ALBUTEROL SULFATE 2.5 MG: 2.5 SOLUTION RESPIRATORY (INHALATION) at 03:35

## 2018-02-20 RX ADMIN — ACETAMINOPHEN 650 MG: 325 TABLET ORAL at 17:09

## 2018-02-20 RX ADMIN — HEPARIN SODIUM 5000 UNITS: 5000 INJECTION, SOLUTION INTRAVENOUS; SUBCUTANEOUS at 21:45

## 2018-02-20 RX ADMIN — ALBUTEROL SULFATE 2.5 MG: 2.5 SOLUTION RESPIRATORY (INHALATION) at 23:20

## 2018-02-20 RX ADMIN — CLONIDINE HYDROCHLORIDE 0.1 MG: 0.1 TABLET ORAL at 11:51

## 2018-02-20 RX ADMIN — LORAZEPAM 2 MG: 2 INJECTION INTRAMUSCULAR; INTRAVENOUS at 11:57

## 2018-02-20 RX ADMIN — FAMOTIDINE 20 MG: 20 TABLET, FILM COATED ORAL at 20:38

## 2018-02-20 RX ADMIN — Medication 10 ML: at 11:52

## 2018-02-20 RX ADMIN — HYDRALAZINE HYDROCHLORIDE 75 MG: 50 TABLET, FILM COATED ORAL at 15:49

## 2018-02-20 RX ADMIN — Medication 10 ML: at 20:48

## 2018-02-20 RX ADMIN — OXYCODONE HYDROCHLORIDE 5 MG: 5 TABLET ORAL at 11:52

## 2018-02-20 RX ADMIN — HYDRALAZINE HYDROCHLORIDE 75 MG: 50 TABLET, FILM COATED ORAL at 21:43

## 2018-02-20 RX ADMIN — IPRATROPIUM BROMIDE AND ALBUTEROL SULFATE 1 AMPULE: .5; 3 SOLUTION RESPIRATORY (INHALATION) at 12:22

## 2018-02-20 RX ADMIN — AMLODIPINE BESYLATE 10 MG: 10 TABLET ORAL at 11:51

## 2018-02-20 RX ADMIN — OXYCODONE HYDROCHLORIDE 5 MG: 5 TABLET ORAL at 20:38

## 2018-02-20 RX ADMIN — AMPICILLIN SODIUM AND SULBACTAM SODIUM 3 G: 2; 1 INJECTION, POWDER, FOR SOLUTION INTRAMUSCULAR; INTRAVENOUS at 21:42

## 2018-02-20 RX ADMIN — OXYCODONE HYDROCHLORIDE 5 MG: 5 TABLET ORAL at 15:48

## 2018-02-20 RX ADMIN — DESMOPRESSIN ACETATE 40 MG: 0.2 TABLET ORAL at 20:38

## 2018-02-20 RX ADMIN — DIAZEPAM 5 MG: 5 TABLET ORAL at 20:38

## 2018-02-20 RX ADMIN — ACETAMINOPHEN 650 MG: 325 TABLET ORAL at 12:07

## 2018-02-20 RX ADMIN — SODIUM CHLORIDE: 9 INJECTION, SOLUTION INTRAVENOUS at 05:31

## 2018-02-20 RX ADMIN — ACETAMINOPHEN 650 MG: 325 TABLET ORAL at 05:37

## 2018-02-20 RX ADMIN — AMPICILLIN SODIUM AND SULBACTAM SODIUM 3 G: 2; 1 INJECTION, POWDER, FOR SOLUTION INTRAMUSCULAR; INTRAVENOUS at 15:48

## 2018-02-20 ASSESSMENT — PAIN SCALES - GENERAL: PAINLEVEL_OUTOF10: 0

## 2018-02-20 ASSESSMENT — PULMONARY FUNCTION TESTS
PIF_VALUE: 21
PIF_VALUE: 7
PIF_VALUE: 25
PIF_VALUE: 21
PIF_VALUE: 16
PIF_VALUE: 15
PIF_VALUE: 24
PIF_VALUE: 11

## 2018-02-20 NOTE — PLAN OF CARE
Problem: Anxiety/Stress:  Goal: Level of anxiety will decrease  Level of anxiety will decrease   Outcome: Ongoing      Problem: Gas Exchange - Impaired:  Goal: Levels of oxygenation will improve  Levels of oxygenation will improve   Outcome: Ongoing      Problem: Pain:  Goal: Pain level will decrease  Pain level will decrease    Outcome: Ongoing    Goal: Recognizes and communicates pain  Recognizes and communicates pain   Outcome: Ongoing    Goal: Control of acute pain  Control of acute pain   Outcome: Ongoing    Goal: Control of chronic pain  Control of chronic pain   Outcome: Ongoing      Problem: Falls - Risk of  Goal: Absence of falls  Outcome: Ongoing      Problem: Risk for Impaired Skin Integrity  Goal: Tissue integrity - skin and mucous membranes  Structural intactness and normal physiological function of skin and  mucous membranes.    Outcome: Ongoing      Problem: Pain:  Goal: Control of acute pain  Control of acute pain   Outcome: Ongoing    Goal: Pain level will decrease  Pain level will decrease    Outcome: Ongoing      Problem: Nutrition  Goal: Optimal nutrition therapy  Outcome: Ongoing      Problem: ABCDS Injury Assessment  Goal: Absence of physical injury  Outcome: Ongoing      Problem: Neurological  Goal: Maximum potential motor/sensory/cognitive function  Outcome: Ongoing      Problem: OXYGENATION/RESPIRATORY FUNCTION  Goal: Patient will maintain patent airway  Outcome: Ongoing    Goal: Patient will achieve/maintain normal respiratory rate/effort  Respiratory rate and effort will be within normal limits for the patient   Outcome: Ongoing      Problem: MECHANICAL VENTILATION  Goal: Patient will maintain patent airway  Outcome: Ongoing    Goal: Oral health is maintained or improved  Outcome: Ongoing    Goal: Ability to express needs and understand communication  Outcome: Ongoing    Goal: Mobility/activity is maintained at optimum level for patient  Outcome: Ongoing    Goal: Tracheostomy will be

## 2018-02-20 NOTE — PROGRESS NOTES
15 g PRN   dextrose 12.5 g PRN   glucagon (rDNA) 1 mg PRN   dextrose 100 mL/hr PRN   lidocaine viscous 5 mL PRN   magnesium hydroxide 30 mL Daily PRN   ondansetron 4 mg Q6H PRN   potassium chloride 40 mEq PRN   Or     potassium chloride 40 mEq PRN   Or     potassium chloride 10 mEq PRN   sodium chloride flush 10 mL PRN   acetaminophen 650 mg Q4H PRN       SUPPORT DEVICES: [x] Ventilator [] BIPAP  [] Nasal Cannula [] Room Air    VENT SETTINGS (Comprehensive) (if applicable):   PRVC mode, FiO2 35%, PEEP 5, Respiratory Rate 20, Tidal Volume 370  Vent Information  Ventilator Started: Yes  Ventilation Day(s): 19  Vent Type: Servo i  Vent Mode: PRVC/AC  Vt Ordered: 370 mL  Vt Exhaled: 365 mL  Spont VT: 320 mL  Pressure Ordered: 10  Rate Set: 20 bmp  Rate Measured: 32 br/min  Minute Volume: 12.5 Liters  Pressure Support: 10 cmH20  FiO2 : 35 %  Peak Inspiratory Pressure: 15 cmH2O  I:E Ratio: 1:1.22  Sensitivity: 5  Flow by: 30  PEEP/CPAP: 5  I Time/ I Time %: 0.7 s  Mean Airway Pressure: 9.5 cmH20  Plateau Pressure: 20 cmH20  Static Compliance: 20 mL/cmH2O  Dynamic Compliance: 20 mL/cmH2O  Total PEEP: 8 cmH20  Auto PEEP: 1 cmH20  Nitric Oxide/Epoprostenol In Use?: No  NO Set: (S) 0  NO Analyzed: 0 ppm  NO2 Analyzed: 0 ppm  Additional Respiratory  Assessments  Pulse: 96  Resp: 29  SpO2: 96 %  End Tidal CO2: 54 (%)  Position: Semi-Blakely's  Humidification Temp: 37  HME (Heat moisture exchanger): No  Circuit Condensation: Drained  Oral Care Completed?: Yes  Oral Care: Mouthwash, Mouth moisturizer, Mouth suctioned  Subglottic Suction Done?: Yes  Cuff Pressure (cm H2O): 24 cm H2O  Skin barrier applied: Yes    ABGs:   Not performed today  Lab Results   Component Value Date    SYZ8QGM 38 02/19/2018    FIO2 45.0 02/19/2018         DATA:  Complete Blood Count: Recent Labs      02/18/18   0351  02/19/18   0342  02/20/18   0414   WBC  11.7*  7.5  7.4   RBC  3.46*  3.10*  3.59*   HGB  8.9*  8.0*  9.5*   HCT  31.9*  28.4*  33.5*   MCV (1/27) x2: negative  Blood culture (2/3) x2: negative  Sputum culture (2/19): negative to date    Radiology/Imaging:  No new imaging over last 24 hours    ASSESSMENT:     Patient Active Problem List    Diagnosis Date Noted    Acute non-recurrent pansinusitis     Status post tracheostomy ([address] 02/17/2018    Status post insertion of percutaneous endoscopic gastrostomy (PEG) tube ([facility] 02/17/2018    Rhinovirus infection     Encounter for PEG (percutaneous endoscopic gastrostomy) ([facility]     Fever     Disease due to rhinovirus     ARDS (adult respiratory distress syndrome) ([address] 02/02/2018    COPD exacerbation ([facility]     NSTEMI (non-ST elevated myocardial infarction) ([facility] 01/27/2018    Acute pulmonary edema ([address] 01/27/2018    Hypertensive emergency 01/27/2018    Acute respiratory failure with hypoxia and hypercapnia ([facility] 01/27/2018    Smoker 01/27/2018    Morbid obesity ([facility] 01/27/2018    Elevated troponin 01/27/2018    Obesity hypoventilation syndrome ([facility] 01/27/2018    SOB (shortness of breath) 01/27/2018    Pneumonia of both lower lobes due to infectious organism 01/26/2018          PLAN:     WEAN PER PROTOCOL:  [] No   [x] Yes  [] N/A    ICU PROPHYLAXIS:  Stress ulcer:  [x] PPI Agent  [] M2Krpta [] Sucralfate  [] Other:  VTE:   [] Enoxaparin  [x] Unfract.  Heparin Subcut  [] EPC Cuffs    NUTRITION:  [] NPO [x] Tube Feeding  [] TPN  [] PO    HOME MEDS RECONCILED: [] No  [x] Yes    CONSULTATION NEEDED:  [x] No  [] Yes    FAMILY UPDATED:    [] No  [] Yes    TRANSFER OUT OF ICU:   [x] No  [] Yes        Additional Assessment:  Principal Problem:    ARDS (adult respiratory distress syndrome) (HCC)  Active Problems:    Pneumonia of both lower lobes due to infectious organism    NSTEMI (non-ST elevated myocardial infarction) ([address]    Acute pulmonary edema (HCC)    Hypertensive emergency    Acute respiratory failure with hypoxia and hypercapnia ([facility]    Smoker    Morbid obesity ([facility]    Elevated

## 2018-02-20 NOTE — PROGRESS NOTES
advanced to 40cc and tolerating. No residual.     CT scan from 2/18/2018 showed no acute intracranial process except for sinusitis and mastoiditis. Suspect this is the source of the patient's low grade fevers. CXR from 2/17/18, increased right perihilar and bibasilar space disease most likely pulmonary edema    CXR from 2/13 with cardiomegaly and bilateral airspace disease     Summary of relevant labs:  Labs:  WBC 7.5  Micro:  1/26.  Blood culture negative  2/4.  Blood culture negative  2/4.  Urine culture Candida  2/19 Sputum culture: No significant pathogens seen.      Imaging:  CT Head without contrast done 2/18/2018 showing pansinusitis:  SINUSES: Diffuse opacification of the bilateral sphenoid sinuses.  Moderate  to advanced mucosal thickening of the bilateral ethmoid and left maxillary  sinuses.  Bilateral opacification the mastoid air cells which may relate to  inflammation or eustachian tube dysfunction.        Discussed with patient, RN, CC. .    I have personally reviewed the past medical history, past surgical history, medications, social history, and family   Physical Examination :     Patient Vitals for the past 8 hrs:   BP Pulse Resp SpO2 Height Weight   02/20/18 1242 - 109 30 96 % - -   02/20/18 0830 - 119 27 96 % - -   02/20/18 0827 - - (!) 34 96 % - -   02/20/18 0600 (!) 157/67 96 29 96 % - -   02/20/18 0534 - - - - 5' (1.524 m) (!) 365 lb 11.9 oz (165.9 kg)     PHYSICAL EXAM     Constitutional: She is well-developed, well-nourished, and in no distress. HENT:   Head: Normocephalic and atraumatic. Nose: Nose normal.   Eyes: Conjunctivae are normal. No scleral icterus. Neck: Neck supple. No tracheal deviation present. Tracheostomy site clean   Cardiovascular: Normal rate and normal heart sounds.    No murmur heard. Pulmonary/Chest: Effort normal. No respiratory distress. She has no wheezes. Abdominal: Bowel sounds are normal. She exhibits no distension. There is no tenderness.    Large abdomen with a new PEG,   Musculoskeletal: She exhibits edema. She exhibits no deformity. Morbid obesity   Neurological: She is alert. Skin: Skin is warm and dry. She is not diaphoretic. No erythema. Psychiatric: Affect normal.   Alert on the vent     Medical Decision Making -Laboratory:   I have independently reviewed/ordered the following labs:    Component Collected Lab   Specimen Description 02/19/2018 10:14  [facility]   . INDUCED SPUTUM    Special Requests 02/19/2018 10:14  [address]   NOT REPORTED    Direct Exam 02/19/2018 10:14  [address]   >25 NEUTROPHILS/LPF    Direct Exam 02/19/2018 10:14  [address]   < 10 EPITHELIAL CELLS/LPF    Direct Exam 02/19/2018 10:14  [address]   NO SIGNIFICANT PATHOGENS SEEN    Direct Exam 02/19/2018 10:14 AM 3 [address], [address] [place] [address] [phone]    Culture 02/19/2018 10:14  [address]   Pending    Status 02/19/2018 10:14  [address]   Pending        CBC with Differential:   Recent Labs      02/19/18   0342  02/20/18   0414   WBC  7.5  7.4   HGB  8.0*  9.5*   HCT  28.4*  33.5*   PLT  235  189     BMP:   Recent Labs      02/19/18   0342  02/20/18   0414   NA  145*  140   K  3.4*  4.1   CL  104  101   CO2  33*  27   BUN  18  16   CREATININE  0.39*  0.35*     Hepatic Function Panel: No results for input(s): PROT, LABALBU, BILIDIR, IBILI, BILITOT, ALKPHOS, ALT, AST in the last 72 hours. No results for input(s): RPR in the last 72 hours. No results for input(s): HIV in the last 72 hours. No results for input(s): BC in the last 72 hours.   Lab Results   Component Value Date    MUCUS 1+ 01/26/2018    RBC 3.59 02/20/2018    TRICHOMONAS NOT REPORTED 01/26/2018    WBC 7.4 02/20/2018    YEAST NOT REPORTED 01/26/2018    TURBIDITY CLEAR 01/26/2018     Lab Results   Component Value Date CREATININE 0.35 2018    GLUCOSE 126 2018       Medical Decision Making-Imagin2018  Narrative   EXAMINATION:   SINGLE VIEW OF THE CHEST       2018 1:18 pm       COMPARISON:   2018       HISTORY:   ORDERING SYSTEM PROVIDED HISTORY: increasing secretion.  fever   TECHNOLOGIST PROVIDED HISTORY:   Reason for exam:->increasing secretion.  fever       FINDINGS:   Stable tracheostomy positioning, terminating over the middle 3rd of the   trachea above the lore.  Improved pulmonary expansion with otherwise   persistent bilateral pulmonary opacities.  No pneumothorax.  Costophrenic   sulci are obscured.           Impression   Stable tracheostomy positioning.       Improved pulmonary expansion with otherwise persistent bilateral airspace   disease.       Suspect tiny effusions. 2018  Narrative   EXAMINATION:   CT OF THE HEAD WITHOUT CONTRAST  2018 12:18 pm       TECHNIQUE:   CT of the head was performed without the administration of intravenous   contrast. Dose modulation, iterative reconstruction, and/or weight based   adjustment of the mA/kV was utilized to reduce the radiation dose to as low   as reasonably achievable.       COMPARISON:   None.       HISTORY:   ORDERING SYSTEM PROVIDED HISTORY: eval stroke   TECHNOLOGIST PROVIDED HISTORY:   Has a \"code stroke\" or \"stroke alert\" been called? ->No       FINDINGS:   BRAIN/VENTRICLES: Posterior fossa assessment limited streak artifact.  There   is no acute intracranial hemorrhage, mass effect or midline shift.  No   abnormal extra-axial fluid collection.  The gray-white differentiation is   maintained without evidence of an acute infarct.  There is no evidence of   hydrocephalus.       ORBITS: The visualized portion of the orbits demonstrate no acute abnormality.       SINUSES: Diffuse opacification of the bilateral sphenoid sinuses.  Moderate   to advanced mucosal thickening of the bilateral ethmoid and left maxillary

## 2018-02-21 LAB
ANION GAP SERPL CALCULATED.3IONS-SCNC: 14 MMOL/L (ref 9–17)
BUN BLDV-MCNC: 12 MG/DL (ref 6–20)
BUN/CREAT BLD: ABNORMAL (ref 9–20)
CALCIUM SERPL-MCNC: 7.9 MG/DL (ref 8.6–10.4)
CHLORIDE BLD-SCNC: 101 MMOL/L (ref 98–107)
CO2: 31 MMOL/L (ref 20–31)
CREAT SERPL-MCNC: 0.39 MG/DL (ref 0.5–0.9)
CULTURE: ABNORMAL
DIRECT EXAM: ABNORMAL
GFR AFRICAN AMERICAN: >60 ML/MIN
GFR NON-AFRICAN AMERICAN: >60 ML/MIN
GFR SERPL CREATININE-BSD FRML MDRD: ABNORMAL ML/MIN/{1.73_M2}
GFR SERPL CREATININE-BSD FRML MDRD: ABNORMAL ML/MIN/{1.73_M2}
GLUCOSE BLD-MCNC: 114 MG/DL (ref 70–99)
HCG QUANTITATIVE: 2 IU/L
HCT VFR BLD CALC: 30.7 % (ref 36.3–47.1)
HEMOGLOBIN: 8.8 G/DL (ref 11.9–15.1)
Lab: ABNORMAL
MCH RBC QN AUTO: 26.1 PG (ref 25.2–33.5)
MCHC RBC AUTO-ENTMCNC: 28.7 G/DL (ref 28.4–34.8)
MCV RBC AUTO: 91.1 FL (ref 82.6–102.9)
NRBC AUTOMATED: 0 PER 100 WBC
ORGANISM: ABNORMAL
PDW BLD-RTO: 17.6 % (ref 11.8–14.4)
PLATELET # BLD: 240 K/UL (ref 138–453)
PMV BLD AUTO: 10.8 FL (ref 8.1–13.5)
POTASSIUM SERPL-SCNC: 3.2 MMOL/L (ref 3.7–5.3)
RBC # BLD: 3.37 M/UL (ref 3.95–5.11)
SODIUM BLD-SCNC: 146 MMOL/L (ref 135–144)
SPECIMEN DESCRIPTION: ABNORMAL
STATUS: ABNORMAL
WBC # BLD: 7.6 K/UL (ref 3.5–11.3)

## 2018-02-21 PROCEDURE — 2500000003 HC RX 250 WO HCPCS: Performed by: INTERNAL MEDICINE

## 2018-02-21 PROCEDURE — 6370000000 HC RX 637 (ALT 250 FOR IP): Performed by: INTERNAL MEDICINE

## 2018-02-21 PROCEDURE — 6360000002 HC RX W HCPCS: Performed by: STUDENT IN AN ORGANIZED HEALTH CARE EDUCATION/TRAINING PROGRAM

## 2018-02-21 PROCEDURE — 6370000000 HC RX 637 (ALT 250 FOR IP): Performed by: STUDENT IN AN ORGANIZED HEALTH CARE EDUCATION/TRAINING PROGRAM

## 2018-02-21 PROCEDURE — 94762 N-INVAS EAR/PLS OXIMTRY CONT: CPT

## 2018-02-21 PROCEDURE — 97110 THERAPEUTIC EXERCISES: CPT

## 2018-02-21 PROCEDURE — 2580000003 HC RX 258: Performed by: STUDENT IN AN ORGANIZED HEALTH CARE EDUCATION/TRAINING PROGRAM

## 2018-02-21 PROCEDURE — 94003 VENT MGMT INPAT SUBQ DAY: CPT

## 2018-02-21 PROCEDURE — 2500000003 HC RX 250 WO HCPCS: Performed by: STUDENT IN AN ORGANIZED HEALTH CARE EDUCATION/TRAINING PROGRAM

## 2018-02-21 PROCEDURE — 84702 CHORIONIC GONADOTROPIN TEST: CPT

## 2018-02-21 PROCEDURE — 85027 COMPLETE CBC AUTOMATED: CPT

## 2018-02-21 PROCEDURE — 6370000000 HC RX 637 (ALT 250 FOR IP): Performed by: HOSPITALIST

## 2018-02-21 PROCEDURE — 80048 BASIC METABOLIC PNL TOTAL CA: CPT

## 2018-02-21 PROCEDURE — 2580000003 HC RX 258: Performed by: INTERNAL MEDICINE

## 2018-02-21 PROCEDURE — 6360000002 HC RX W HCPCS: Performed by: INTERNAL MEDICINE

## 2018-02-21 PROCEDURE — 99291 CRITICAL CARE FIRST HOUR: CPT | Performed by: INTERNAL MEDICINE

## 2018-02-21 PROCEDURE — 94640 AIRWAY INHALATION TREATMENT: CPT

## 2018-02-21 PROCEDURE — 6370000000 HC RX 637 (ALT 250 FOR IP): Performed by: EMERGENCY MEDICINE

## 2018-02-21 PROCEDURE — 6360000002 HC RX W HCPCS: Performed by: EMERGENCY MEDICINE

## 2018-02-21 PROCEDURE — 94770 HC ETCO2 MONITOR DAILY: CPT

## 2018-02-21 PROCEDURE — 2000000000 HC ICU R&B

## 2018-02-21 PROCEDURE — 36415 COLL VENOUS BLD VENIPUNCTURE: CPT

## 2018-02-21 RX ORDER — HYDRALAZINE HYDROCHLORIDE 20 MG/ML
20 INJECTION INTRAMUSCULAR; INTRAVENOUS ONCE
Status: COMPLETED | OUTPATIENT
Start: 2018-02-21 | End: 2018-02-21

## 2018-02-21 RX ORDER — HYDRALAZINE HYDROCHLORIDE 20 MG/ML
75 INJECTION INTRAMUSCULAR; INTRAVENOUS 3 TIMES DAILY
Status: DISCONTINUED | OUTPATIENT
Start: 2018-02-21 | End: 2018-02-21

## 2018-02-21 RX ORDER — LORAZEPAM 2 MG/ML
2 INJECTION INTRAMUSCULAR EVERY 4 HOURS PRN
Qty: 10 ML | Refills: 0 | Status: SHIPPED | OUTPATIENT
Start: 2018-02-21 | End: 2018-02-28

## 2018-02-21 RX ORDER — PIPERACILLIN SODIUM, TAZOBACTAM SODIUM 3; .375 G/15ML; G/15ML
3.38 INJECTION, POWDER, LYOPHILIZED, FOR SOLUTION INTRAVENOUS EVERY 8 HOURS
Qty: 60.75 G | Refills: 0 | Status: SHIPPED | OUTPATIENT
Start: 2018-02-21 | End: 2018-02-27

## 2018-02-21 RX ORDER — FUROSEMIDE 20 MG/1
20 TABLET ORAL DAILY
Status: DISCONTINUED | OUTPATIENT
Start: 2018-02-22 | End: 2018-02-23 | Stop reason: HOSPADM

## 2018-02-21 RX ORDER — LISINOPRIL 10 MG/1
10 TABLET ORAL DAILY
Status: DISCONTINUED | OUTPATIENT
Start: 2018-02-22 | End: 2018-02-23

## 2018-02-21 RX ORDER — ATORVASTATIN CALCIUM 40 MG/1
40 TABLET, FILM COATED ORAL NIGHTLY
Qty: 30 TABLET | Refills: 3 | Status: SHIPPED | OUTPATIENT
Start: 2018-02-21

## 2018-02-21 RX ORDER — IPRATROPIUM BROMIDE AND ALBUTEROL SULFATE 2.5; .5 MG/3ML; MG/3ML
3 SOLUTION RESPIRATORY (INHALATION) 4 TIMES DAILY
Qty: 360 ML | Refills: 0 | Status: ON HOLD | OUTPATIENT
Start: 2018-02-21 | End: 2018-09-20 | Stop reason: HOSPADM

## 2018-02-21 RX ORDER — DIAZEPAM 5 MG/1
5 TABLET ORAL 3 TIMES DAILY
Status: DISCONTINUED | OUTPATIENT
Start: 2018-02-21 | End: 2018-02-22

## 2018-02-21 RX ORDER — GLYCOPYRROLATE 0.2 MG/ML
0.1 INJECTION INTRAMUSCULAR; INTRAVENOUS 3 TIMES DAILY
Qty: 10 ML | Refills: 0 | Status: ON HOLD | OUTPATIENT
Start: 2018-02-21 | End: 2018-09-20 | Stop reason: HOSPADM

## 2018-02-21 RX ORDER — FAMOTIDINE 20 MG/1
20 TABLET, FILM COATED ORAL 2 TIMES DAILY
Qty: 60 TABLET | Refills: 3 | Status: SHIPPED | OUTPATIENT
Start: 2018-02-21 | End: 2018-12-21 | Stop reason: ALTCHOICE

## 2018-02-21 RX ORDER — POLYVINYL ALCOHOL 14 MG/ML
1 SOLUTION/ DROPS OPHTHALMIC PRN
Qty: 1 BOTTLE | Refills: 4 | Status: SHIPPED | OUTPATIENT
Start: 2018-02-21 | End: 2018-03-23

## 2018-02-21 RX ORDER — CLONIDINE HYDROCHLORIDE 0.1 MG/1
0.1 TABLET ORAL 3 TIMES DAILY PRN
Qty: 60 TABLET | Refills: 3 | Status: SHIPPED | OUTPATIENT
Start: 2018-02-21 | End: 2019-06-24

## 2018-02-21 RX ORDER — OXYCODONE HYDROCHLORIDE 5 MG/1
5 TABLET ORAL EVERY 6 HOURS
Qty: 20 TABLET | Refills: 0 | Status: SHIPPED | OUTPATIENT
Start: 2018-02-21 | End: 2018-02-22 | Stop reason: HOSPADM

## 2018-02-21 RX ORDER — HYDRALAZINE HYDROCHLORIDE 25 MG/1
75 TABLET, FILM COATED ORAL EVERY 8 HOURS SCHEDULED
Qty: 90 TABLET | Refills: 3 | Status: ON HOLD | OUTPATIENT
Start: 2018-02-21 | End: 2018-09-20

## 2018-02-21 RX ORDER — DIAZEPAM 5 MG/1
5 TABLET ORAL 3 TIMES DAILY
Qty: 30 TABLET | Refills: 0 | Status: SHIPPED | OUTPATIENT
Start: 2018-02-21 | End: 2018-03-03

## 2018-02-21 RX ORDER — ATORVASTATIN CALCIUM 40 MG/1
40 TABLET, FILM COATED ORAL NIGHTLY
Status: DISCONTINUED | OUTPATIENT
Start: 2018-02-21 | End: 2018-02-23 | Stop reason: HOSPADM

## 2018-02-21 RX ADMIN — ACETAMINOPHEN 650 MG: 325 TABLET ORAL at 01:56

## 2018-02-21 RX ADMIN — IPRATROPIUM BROMIDE AND ALBUTEROL SULFATE 1 AMPULE: .5; 3 SOLUTION RESPIRATORY (INHALATION) at 07:55

## 2018-02-21 RX ADMIN — POTASSIUM CHLORIDE 40 MEQ: 40 SOLUTION ORAL at 06:53

## 2018-02-21 RX ADMIN — Medication 10 ML: at 14:01

## 2018-02-21 RX ADMIN — HYDRALAZINE HYDROCHLORIDE 75 MG: 50 TABLET, FILM COATED ORAL at 23:03

## 2018-02-21 RX ADMIN — HEPARIN SODIUM 5000 UNITS: 5000 INJECTION, SOLUTION INTRAVENOUS; SUBCUTANEOUS at 14:04

## 2018-02-21 RX ADMIN — IPRATROPIUM BROMIDE AND ALBUTEROL SULFATE 1 AMPULE: .5; 3 SOLUTION RESPIRATORY (INHALATION) at 12:24

## 2018-02-21 RX ADMIN — Medication 10 ML: at 14:11

## 2018-02-21 RX ADMIN — ACETAMINOPHEN 650 MG: 325 TABLET ORAL at 14:02

## 2018-02-21 RX ADMIN — HYDRALAZINE HYDROCHLORIDE 75 MG: 50 TABLET, FILM COATED ORAL at 06:02

## 2018-02-21 RX ADMIN — DIAZEPAM 5 MG: 5 TABLET ORAL at 23:04

## 2018-02-21 RX ADMIN — OXYCODONE HYDROCHLORIDE 5 MG: 5 TABLET ORAL at 01:56

## 2018-02-21 RX ADMIN — SODIUM CHLORIDE: 9 INJECTION, SOLUTION INTRAVENOUS at 06:14

## 2018-02-21 RX ADMIN — Medication 10 ML: at 08:12

## 2018-02-21 RX ADMIN — DIAZEPAM 5 MG: 5 TABLET ORAL at 08:01

## 2018-02-21 RX ADMIN — Medication 10 ML: at 08:13

## 2018-02-21 RX ADMIN — DIAZEPAM 5 MG: 5 TABLET ORAL at 13:58

## 2018-02-21 RX ADMIN — IPRATROPIUM BROMIDE AND ALBUTEROL SULFATE 1 AMPULE: .5; 3 SOLUTION RESPIRATORY (INHALATION) at 19:45

## 2018-02-21 RX ADMIN — HYDRALAZINE HYDROCHLORIDE 75 MG: 50 TABLET, FILM COATED ORAL at 14:03

## 2018-02-21 RX ADMIN — ALBUTEROL SULFATE 2.5 MG: 2.5 SOLUTION RESPIRATORY (INHALATION) at 03:06

## 2018-02-21 RX ADMIN — AMPICILLIN SODIUM AND SULBACTAM SODIUM 3 G: 2; 1 INJECTION, POWDER, FOR SOLUTION INTRAMUSCULAR; INTRAVENOUS at 04:02

## 2018-02-21 RX ADMIN — OXYCODONE HYDROCHLORIDE 5 MG: 5 TABLET ORAL at 13:58

## 2018-02-21 RX ADMIN — FLUTICASONE PROPIONATE 1 SPRAY: 50 SPRAY, METERED NASAL at 08:12

## 2018-02-21 RX ADMIN — GLYCOPYRROLATE 0.1 MG: 0.2 INJECTION INTRAMUSCULAR; INTRAVENOUS at 08:04

## 2018-02-21 RX ADMIN — HYDRALAZINE HYDROCHLORIDE 20 MG: 20 INJECTION INTRAMUSCULAR; INTRAVENOUS at 09:33

## 2018-02-21 RX ADMIN — Medication 10 ML: at 20:31

## 2018-02-21 RX ADMIN — ALBUTEROL SULFATE 2.5 MG: 2.5 SOLUTION RESPIRATORY (INHALATION) at 23:21

## 2018-02-21 RX ADMIN — PIPERACILLIN AND TAZOBACTAM 3.38 G: 3; .375 INJECTION, POWDER, LYOPHILIZED, FOR SOLUTION INTRAVENOUS; PARENTERAL at 22:57

## 2018-02-21 RX ADMIN — FAMOTIDINE 20 MG: 20 TABLET, FILM COATED ORAL at 08:11

## 2018-02-21 RX ADMIN — FUROSEMIDE 20 MG: 20 TABLET ORAL at 08:59

## 2018-02-21 RX ADMIN — LORAZEPAM 2 MG: 2 INJECTION INTRAMUSCULAR; INTRAVENOUS at 13:58

## 2018-02-21 RX ADMIN — FAMOTIDINE 20 MG: 20 TABLET, FILM COATED ORAL at 23:23

## 2018-02-21 RX ADMIN — AMPICILLIN SODIUM AND SULBACTAM SODIUM 3 G: 2; 1 INJECTION, POWDER, FOR SOLUTION INTRAMUSCULAR; INTRAVENOUS at 10:40

## 2018-02-21 RX ADMIN — AMLODIPINE BESYLATE 10 MG: 10 TABLET ORAL at 08:11

## 2018-02-21 RX ADMIN — GLYCOPYRROLATE 0.1 MG: 0.2 INJECTION INTRAMUSCULAR; INTRAVENOUS at 14:03

## 2018-02-21 RX ADMIN — HEPARIN SODIUM 5000 UNITS: 5000 INJECTION, SOLUTION INTRAVENOUS; SUBCUTANEOUS at 23:06

## 2018-02-21 RX ADMIN — OXYCODONE HYDROCHLORIDE 5 MG: 5 TABLET ORAL at 08:01

## 2018-02-21 RX ADMIN — ACETAMINOPHEN 650 MG: 325 TABLET ORAL at 23:03

## 2018-02-21 RX ADMIN — CLONIDINE HYDROCHLORIDE 0.1 MG: 0.1 TABLET ORAL at 08:41

## 2018-02-21 RX ADMIN — IPRATROPIUM BROMIDE AND ALBUTEROL SULFATE 1 AMPULE: .5; 3 SOLUTION RESPIRATORY (INHALATION) at 15:43

## 2018-02-21 RX ADMIN — PIPERACILLIN AND TAZOBACTAM 3.38 G: 3; .375 INJECTION, POWDER, LYOPHILIZED, FOR SOLUTION INTRAVENOUS; PARENTERAL at 15:56

## 2018-02-21 RX ADMIN — GLYCOPYRROLATE 0.1 MG: 0.2 INJECTION INTRAMUSCULAR; INTRAVENOUS at 23:23

## 2018-02-21 RX ADMIN — ACETAMINOPHEN 650 MG: 325 TABLET ORAL at 06:52

## 2018-02-21 RX ADMIN — HEPARIN SODIUM 5000 UNITS: 5000 INJECTION, SOLUTION INTRAVENOUS; SUBCUTANEOUS at 06:02

## 2018-02-21 RX ADMIN — OXYCODONE HYDROCHLORIDE 5 MG: 5 TABLET ORAL at 23:03

## 2018-02-21 RX ADMIN — LORAZEPAM 2 MG: 2 INJECTION INTRAMUSCULAR; INTRAVENOUS at 08:02

## 2018-02-21 RX ADMIN — DESMOPRESSIN ACETATE 40 MG: 0.2 TABLET ORAL at 23:03

## 2018-02-21 RX ADMIN — LORAZEPAM 2 MG: 2 INJECTION INTRAMUSCULAR; INTRAVENOUS at 02:55

## 2018-02-21 RX ADMIN — LISINOPRIL 15 MG: 10 TABLET ORAL at 08:10

## 2018-02-21 ASSESSMENT — PULMONARY FUNCTION TESTS
PIF_VALUE: 24
PIF_VALUE: 26
PIF_VALUE: 31
PIF_VALUE: 25
PIF_VALUE: 27
PIF_VALUE: 22
PIF_VALUE: 25
PIF_VALUE: 23
PIF_VALUE: 19

## 2018-02-21 ASSESSMENT — PAIN DESCRIPTION - LOCATION: LOCATION: THROAT;ABDOMEN

## 2018-02-21 ASSESSMENT — PAIN SCALES - GENERAL
PAINLEVEL_OUTOF10: 3
PAINLEVEL_OUTOF10: 0
PAINLEVEL_OUTOF10: 5
PAINLEVEL_OUTOF10: 0
PAINLEVEL_OUTOF10: 7

## 2018-02-21 ASSESSMENT — PAIN DESCRIPTION - PAIN TYPE: TYPE: SURGICAL PAIN

## 2018-02-21 NOTE — PLAN OF CARE
Problem: OXYGENATION/RESPIRATORY FUNCTION  Goal: Patient will maintain patent airway  Outcome: Ongoing    Goal: Patient will achieve/maintain normal respiratory rate/effort  Respiratory rate and effort will be within normal limits for the patient   Outcome: Ongoing      Problem: MECHANICAL VENTILATION  Goal: Patient will maintain patent airway  Outcome: Ongoing    Goal: Oral health is maintained or improved  Outcome: Ongoing    Goal: Ability to express needs and understand communication  Outcome: Ongoing    Goal: Mobility/activity is maintained at optimum level for patient  Outcome: Ongoing    Goal: Tracheostomy will be managed safely  Outcome: Ongoing      Problem: SKIN INTEGRITY  Goal: Skin integrity is maintained or improved  Outcome: Ongoing

## 2018-02-21 NOTE — PROGRESS NOTES
Physical Therapy  DATE: 2018  NAME: Milagro Brewster  MRN: [id number]   : 1975    Subjective: Pt with Trach  Pain: Pt with C/O pain Lt wrist and elbow. Patient follows: Some commands  Is patient on ventilator: Yes via Trach  Is patient on sedation: No  Precautions: N/A    Therapeutic exercises:  PROM all planes x 10 reps RUE and BLE. Bilateral gastrocnemius stretching  Limited ROM d/t pain LUE. Limited ROM at Bilateral hips d/t body habitus. Goals  Short Term Goals  Short term goal 1: prevent contractures x 4  Short term goal 2: facilitate active movement x 4 when pt off sedation  Short term goal 3: mobilize pt when off sedation and set goals          Plan: Progress functional mobility as medically appropriate.    Time In: 1435  Time Out: 1451  Time Coded Minutes (treatment minutes): 16  Rehab Potential: Fair  Treatments/week: 2/3x week    Rl Lopez, PT

## 2018-02-21 NOTE — PROGRESS NOTES
ENT Progress Note    Impression  Morbid obesity  S/P Trach on Saturday    PLAN-   -Please remove trach sutures on Saturday  -continue current management  -continue with routine trach care and suction    Stef Day is a 43 y.o. female patient. 1. COPD exacerbation ([facility]    2. Pneumonia due to organism    3. Sepsis, due to unspecified organism ([facility]    4. Hypertensive emergency      Past Medical History:   Diagnosis Date    COPD (chronic obstructive pulmonary disease) ([facility])      No past surgical history pertinent negatives on file. Allergies   Allergen Reactions    Asa [Aspirin]     Sulfa Antibiotics      Principal Problem:    ARDS (adult respiratory distress syndrome) ([facility])  Active Problems:    Pneumonia of both lower lobes due to infectious organism    NSTEMI (non-ST elevated myocardial infarction) ([facility]    Acute pulmonary edema (HCC)    Hypertensive emergency    Acute respiratory failure with hypoxia and hypercapnia ([facility])    Smoker    Morbid obesity (HCC)    Elevated troponin    Obesity hypoventilation syndrome (HCC)    SOB (shortness of breath)    COPD exacerbation ([facility])    Fever    Disease due to rhinovirus    Encounter for PEG (percutaneous endoscopic gastrostomy) ([facility]    Status post tracheostomy ([facility]    Status post insertion of percutaneous endoscopic gastrostomy (PEG) tube ([facility]    Rhinovirus infection    Acute non-recurrent pansinusitis  Resolved Problems:    * No resolved hospital problems. *    Blood pressure (!) 153/56, pulse 103, temperature 101.3 °F (38.5 °C), temperature source Core, resp. rate (!) 33, height 5' (1.524 m), weight (!) 365 lb 11.9 oz (165.9 kg), SpO2 92 %.     NECK-tracheostomy looks good, no bleeding, trach tube is secure, airway stable        Belen Sampson MD  2/21/2018  1:41 PM

## 2018-02-21 NOTE — PROGRESS NOTES
Ventilator Bronchodilator assessment    Breath sounds: bilat wheezing  Inspiratory Pressure: 26  Plateau Pressure: 20    Patient assessed at level 3          []    Bronchodilator Assessment    BRONCHODILATOR ASSESSMENT SCORE  Score 0 (Home) 1 2 3 4   Breath Sounds   []  Chronic Ventilator: Patient at baseline []  Mild Wheezes/ Clear []  Intermittent wheezes with good air entry [x]  Bilateral/unilateral wheezing with diminished air entry []  Insp/Exp wheeze and/or poor aeration   Ventilator Pressures   []  Chronic Ventilator []  Insp. Pressure less than 25 cm H20 []  Insp. Pressure less than 25 cm H20 [x]  Insp. Pressure exceeds 25 cm H20 []  Insp.  Pressure exceeds 30 cm H20   Plateau Pressure []  NA   []  Plateau Pressure less than 4  []  Plateau Pressure less than or equal to 5 [x]  Plateau Pressure greater than or equal to 6 []  Plateau Pressure greater than or equal to [address]  7:56 AM

## 2018-02-21 NOTE — PROGRESS NOTES
INTENSIVE CARE UNIT  Resident Physician Progress Note    Patient - Stef Day  Date of Admission -  2018  8:09 PM  Date of Evaluation -  2018  Room and Bed Number -  0105/0105-01   Hospital Day -     Cc- ARDS  SUBJECTIVE:     OVERNIGHT EVENTS:      Continues to be febrile overnight   No concerns with bp or pain overnight   ID evaluated yesterday  Family still working on [address] COMMANDS:  [] No   [x] Yes    SECRETIONS Amount:  [] Small [x] Moderate  [] Large  [] None  Color:     [x] White [] Colored  [] Bloody    SEDATION:  RAAS Score:  [] Propofol gtt  [] Versed gtt  [] Ativan gtt   [x] No Sedation    PARALYZED:  [x] No    [] Yes    VASOPRESSORS:  [x] No    [] Yes  [] Levophed [] Dopamine [] Vasopressin  [] Dobutamine [] Phenylephrine [] Epinephrine    Ros unable to perform due to intubation and sedation    OBJECTIVE:     VITAL SIGNS:  BP (!) 163/63   Pulse 90   Temp 100.8 °F (38.2 °C) (Core)   Resp 28   Ht 5' (1.524 m)   Wt (!) 365 lb 11.9 oz (165.9 kg)   SpO2 94%   BMI 71.43 kg/m²   Tmax over 24 hours:  Temp (24hrs), Av °F (38.3 °C), Min:100.6 °F (38.1 °C), Max:101.5 °F (38.6 °C)      Patient Vitals for the past 8 hrs:   BP Temp Temp src Pulse Resp SpO2   18 0600 (!) 163/63 - - 90 28 94 %   18 0500 (!) 153/55 - - 87 (!) 31 94 %   18 0400 (!) 119/47 100.8 °F (38.2 °C) CORE 90 (!) 31 (!) 89 %   18 0331 (!) 156/60 - - 95 (!) 31 96 %   18 0306 - - - 98 27 96 %   18 0303 (!) 167/65 - - 106 25 95 %   18 0300 (!) 177/100 - - 96 28 100 %   18 0200 (!) 168/76 - - 95 29 97 %   18 0100 (!) 146/60 - - 94 29 100 %         Intake/Output Summary (Last 24 hours) at 18 0800  Last data filed at 18 0600   Gross per 24 hour   Intake           1927.5 ml   Output             1900 ml   Net             27.5 ml       Date 18 0000 - 18 2359   Shift 5277-3005 4619-4019 4191-1201 24 Hour Total I  N  T  A  K  E   I.V.  (mL/kg) 171.4  (1)   171.4  (1)    NG/GT  (mL/kg) 425  (2.6)   425  (2.6)    Shift Total  (mL/kg) 596.4  (3.6)   596.4  (3.6)   O  U  T  P  U  T   Urine  (mL/kg/hr) 575  (0.4)   575    Shift Total  (mL/kg) 575  (3.5)   575  (3.5)   Weight (kg) 165.9 165.9 165.9 165.9     Wt Readings from Last 3 Encounters:   02/20/18 (!) 365 lb 11.9 oz (165.9 kg)     Body mass index is 71.43 kg/m². PHYSICAL EXAM:  General: Awake and looking around, in no acute distress  Head: NC/AT   Eyes: PERRL, sclera irritation, tracking with eyes   ENT: mucus membranes moist, trach in place w/ dressing is c/d/i  Lung: trach attached to ventilator, equal chest rise, rhonchorous breath sounds bilaterally  CV: R/R, no r/m/g  Abdomen: obese abodmen, soft, with normal bowel movements  Neuro: Awake, nodding yes and no appropriately. Following commands. Able to lift left arm off of the bed. Able to lift should but unable to hold right arm up. Wiggling toes  Skin: no rashes or lesions  Extremities: + for leg edema, distal pulses intact.     MEDICATIONS:  Scheduled Meds:   diazepam  5 mg Oral TID    ampicillin-sulbactam  3 g Intravenous Q6H    Glycopyrrolate  0.1 mg Intravenous TID    lisinopril  15 mg Oral Daily    hydrALAZINE  75 mg Oral 3 times per day    furosemide  20 mg Oral Daily    oxyCODONE  5 mg Oral Q6H    fluticasone  1 spray Each Nare Daily    heparin (porcine)  5,000 Units Subcutaneous 3 times per day    amLODIPine  10 mg Oral Daily    ipratropium-albuterol  1 ampule Inhalation 4x daily    albuterol  2.5 mg Nebulization BID    nicotine  1 patch Transdermal Daily    sodium chloride flush  10 mL Intravenous 2 times per day    famotidine  20 mg Oral BID    atorvastatin  40 mg Oral Nightly     Continuous Infusions:   niCARdipine Stopped (02/18/18 0952)    sodium chloride 10 mL/hr at 02/21/18 0614    dextrose       PRN Meds:     LORazepam 2 mg Q4H PRN   cloNIDine 0.1 mg TID PRN   atropine support systems, review of data including imaging and labs, discussions with other team members and physicians at least 27   Min so far today, excluding procedures. Treatment plan Discussed with nursing staff in detail , all questions answered . Ben Flores MD   2/21/2018   2:13 PM    Please note that this chart was generated using voice recognition Dragon dictation software. Although every effort was made to ensure the accuracy of this automated transcription, some errors in transcription may have occurred.

## 2018-02-21 NOTE — PROGRESS NOTES
[facility]   < 10 EPITHELIAL CELLS/LPF    Direct Exam 2018 10:14  [facility] [facility]   NO SIGNIFICANT PATHOGENS SEEN    Culture  (Abnormal) 2018 10:14 AM Matthiasyovana Kathleen U. 8. NEGATIVE RODS MODERATE GROWTH     Culture 2018 10:14 AM 3 [facility] [id number] [place] [address], [address] [place] [address] [phone]    Status 2018 10:14  [address]   Pending      CBC with Differential:   Recent Labs      18   0414  18   0431   WBC  7.4  7.6   HGB  9.5*  8.8*   HCT  33.5*  30.7*   PLT  189  240     BMP:   Recent Labs      184  18   0431   NA  140  146*   K  4.1  3.2*   CL  101  101   CO2  27  31   BUN  16  12   CREATININE  0.35*  0.39*     Hepatic Function Panel: No results for input(s): PROT, LABALBU, BILIDIR, IBILI, BILITOT, ALKPHOS, ALT, AST in the last 72 hours. No results for input(s): RPR in the last 72 hours. No results for input(s): HIV in the last 72 hours. No results for input(s): BC in the last 72 hours.   Lab Results   Component Value Date    MUCUS 1+ 2018    RBC 3.37 2018    TRICHOMONAS NOT REPORTED 2018    WBC 7.6 2018    YEAST NOT REPORTED 2018    TURBIDITY CLEAR 2018     Lab Results   Component Value Date    CREATININE 0.39 2018    GLUCOSE 114 2018       Medical Decision Making-Imagin2018  Narrative   EXAMINATION:   SINGLE VIEW OF THE CHEST       2018 1:18 pm       COMPARISON:   2018       HISTORY:   ORDERING SYSTEM PROVIDED HISTORY: increasing secretion.  fever   TECHNOLOGIST PROVIDED HISTORY:   Reason for exam:->increasing secretion.  fever       FINDINGS:   Stable tracheostomy positioning, terminating over the middle 3rd of the   trachea above the lore.  Improved pulmonary expansion with otherwise   persistent bilateral pulmonary opacities.  No pneumothorax.  Costophrenic   sulci positioning, terminating over the middle 3rd of the   trachea above the lore.  Improved pulmonary expansion with otherwise   persistent bilateral pulmonary opacities.  No pneumothorax.  Costophrenic   sulci are obscured.           Impression   Stable tracheostomy positioning.       Improved pulmonary expansion with otherwise persistent bilateral airspace   disease.       Suspect tiny effusions.              Medical Decision Making-Other:   2/21/2018  9:47 AM - Kamran, [facility] Incoming Lab Results From [facility] [employer]     Component Results     Component Collected Lab   Specimen Description 02/19/2018 10:14  [facility]   . INDUCED SPUTUM    Special Requests 02/19/2018 10:14  [facility]   NOT REPORTED    Direct Exam 02/19/2018 10:14  [facility]   >25 NEUTROPHILS/LPF    Direct Exam 02/19/2018 10:14  [facility] [facility]   < 10 EPITHELIAL CELLS/LPF    Direct Exam 02/19/2018 10:14  [facility]   NO SIGNIFICANT PATHOGENS SEEN    Culture  (Abnormal) 02/19/2018 10:14  [facility] [facility]   ENTEROBACTER AEROGENES MODERATE GROWTH     Culture 02/19/2018 10:14  [facility]   NORMAL RESPIRATORY TIFFANY LIGHT GROWTH    Culture 02/19/2018 10:14 AM [address] [place] [address] [phone]    Status 02/19/2018 10:14  [facility]   FINAL 02/21/2018    Organism 02/19/2018 10:14 AM Aaron Arellano [facility] Performed By     Radha Foote Name Director Address Valid Date Range   [address] [address] [facility] [address] 08/30/17 1201-Present   Culture & Susceptibility     ENTEROBACTER AEROGENES     Antibiotic Interpretation KEL Status   amikacin Intermediate 32 INTERMEDIATE Final   aztreonam Intermediate 16 INTERMEDIATE Final   ceFAZolin Resistant >=64 RESISTANT Final   cefTRIAXone

## 2018-02-22 LAB
ANION GAP SERPL CALCULATED.3IONS-SCNC: 11 MMOL/L (ref 9–17)
BUN BLDV-MCNC: 11 MG/DL (ref 6–20)
BUN/CREAT BLD: ABNORMAL (ref 9–20)
CALCIUM IONIZED: 1.11 MMOL/L (ref 1.13–1.33)
CALCIUM SERPL-MCNC: 8 MG/DL (ref 8.6–10.4)
CHLORIDE BLD-SCNC: 100 MMOL/L (ref 98–107)
CO2: 32 MMOL/L (ref 20–31)
CREAT SERPL-MCNC: 0.44 MG/DL (ref 0.5–0.9)
GFR AFRICAN AMERICAN: >60 ML/MIN
GFR NON-AFRICAN AMERICAN: >60 ML/MIN
GFR SERPL CREATININE-BSD FRML MDRD: ABNORMAL ML/MIN/{1.73_M2}
GFR SERPL CREATININE-BSD FRML MDRD: ABNORMAL ML/MIN/{1.73_M2}
GLUCOSE BLD-MCNC: 115 MG/DL (ref 70–99)
HCT VFR BLD CALC: 29.7 % (ref 36.3–47.1)
HEMOGLOBIN: 8.7 G/DL (ref 11.9–15.1)
MAGNESIUM: 1.7 MG/DL (ref 1.6–2.6)
MCH RBC QN AUTO: 26 PG (ref 25.2–33.5)
MCHC RBC AUTO-ENTMCNC: 29.3 G/DL (ref 28.4–34.8)
MCV RBC AUTO: 88.9 FL (ref 82.6–102.9)
NRBC AUTOMATED: 0 PER 100 WBC
PDW BLD-RTO: 17.6 % (ref 11.8–14.4)
PHOSPHORUS: 2.6 MG/DL (ref 2.6–4.5)
PLATELET # BLD: 256 K/UL (ref 138–453)
PMV BLD AUTO: 10.9 FL (ref 8.1–13.5)
POTASSIUM SERPL-SCNC: 3.4 MMOL/L (ref 3.7–5.3)
RBC # BLD: 3.34 M/UL (ref 3.95–5.11)
SODIUM BLD-SCNC: 143 MMOL/L (ref 135–144)
WBC # BLD: 8.8 K/UL (ref 3.5–11.3)

## 2018-02-22 PROCEDURE — 6370000000 HC RX 637 (ALT 250 FOR IP): Performed by: HOSPITALIST

## 2018-02-22 PROCEDURE — 6360000002 HC RX W HCPCS: Performed by: INTERNAL MEDICINE

## 2018-02-22 PROCEDURE — 2000000000 HC ICU R&B

## 2018-02-22 PROCEDURE — 6360000002 HC RX W HCPCS: Performed by: STUDENT IN AN ORGANIZED HEALTH CARE EDUCATION/TRAINING PROGRAM

## 2018-02-22 PROCEDURE — 6370000000 HC RX 637 (ALT 250 FOR IP): Performed by: INTERNAL MEDICINE

## 2018-02-22 PROCEDURE — 6360000002 HC RX W HCPCS: Performed by: EMERGENCY MEDICINE

## 2018-02-22 PROCEDURE — 94762 N-INVAS EAR/PLS OXIMTRY CONT: CPT

## 2018-02-22 PROCEDURE — 2580000003 HC RX 258: Performed by: STUDENT IN AN ORGANIZED HEALTH CARE EDUCATION/TRAINING PROGRAM

## 2018-02-22 PROCEDURE — 2580000003 HC RX 258: Performed by: INTERNAL MEDICINE

## 2018-02-22 PROCEDURE — 2500000003 HC RX 250 WO HCPCS: Performed by: STUDENT IN AN ORGANIZED HEALTH CARE EDUCATION/TRAINING PROGRAM

## 2018-02-22 PROCEDURE — 6370000000 HC RX 637 (ALT 250 FOR IP): Performed by: EMERGENCY MEDICINE

## 2018-02-22 PROCEDURE — 94640 AIRWAY INHALATION TREATMENT: CPT

## 2018-02-22 PROCEDURE — 99291 CRITICAL CARE FIRST HOUR: CPT | Performed by: INTERNAL MEDICINE

## 2018-02-22 PROCEDURE — 6370000000 HC RX 637 (ALT 250 FOR IP): Performed by: STUDENT IN AN ORGANIZED HEALTH CARE EDUCATION/TRAINING PROGRAM

## 2018-02-22 PROCEDURE — 36415 COLL VENOUS BLD VENIPUNCTURE: CPT

## 2018-02-22 PROCEDURE — 85027 COMPLETE CBC AUTOMATED: CPT

## 2018-02-22 PROCEDURE — 94770 HC ETCO2 MONITOR DAILY: CPT

## 2018-02-22 PROCEDURE — 84100 ASSAY OF PHOSPHORUS: CPT

## 2018-02-22 PROCEDURE — 82330 ASSAY OF CALCIUM: CPT

## 2018-02-22 PROCEDURE — 99233 SBSQ HOSP IP/OBS HIGH 50: CPT | Performed by: INTERNAL MEDICINE

## 2018-02-22 PROCEDURE — 2500000003 HC RX 250 WO HCPCS: Performed by: INTERNAL MEDICINE

## 2018-02-22 PROCEDURE — 94003 VENT MGMT INPAT SUBQ DAY: CPT

## 2018-02-22 PROCEDURE — 83735 ASSAY OF MAGNESIUM: CPT

## 2018-02-22 PROCEDURE — 80048 BASIC METABOLIC PNL TOTAL CA: CPT

## 2018-02-22 RX ORDER — CALCIUM GLUCONATE 94 MG/ML
1 INJECTION, SOLUTION INTRAVENOUS ONCE
Status: DISCONTINUED | OUTPATIENT
Start: 2018-02-22 | End: 2018-02-22

## 2018-02-22 RX ORDER — OXYCODONE HYDROCHLORIDE 10 MG/1
10 TABLET ORAL EVERY 6 HOURS PRN
Qty: 28 TABLET | Refills: 0 | Status: SHIPPED | OUTPATIENT
Start: 2018-02-22 | End: 2018-03-01

## 2018-02-22 RX ORDER — GLYCOPYRROLATE 0.2 MG/ML
0.1 INJECTION INTRAMUSCULAR; INTRAVENOUS 4 TIMES DAILY
Status: DISCONTINUED | OUTPATIENT
Start: 2018-02-22 | End: 2018-02-23

## 2018-02-22 RX ORDER — OXYCODONE HYDROCHLORIDE 5 MG/1
10 TABLET ORAL EVERY 6 HOURS
Status: DISCONTINUED | OUTPATIENT
Start: 2018-02-22 | End: 2018-02-23 | Stop reason: HOSPADM

## 2018-02-22 RX ORDER — DIAZEPAM 5 MG/1
5 TABLET ORAL EVERY 4 HOURS
Status: DISCONTINUED | OUTPATIENT
Start: 2018-02-22 | End: 2018-02-23 | Stop reason: HOSPADM

## 2018-02-22 RX ADMIN — FAMOTIDINE 20 MG: 20 TABLET, FILM COATED ORAL at 08:30

## 2018-02-22 RX ADMIN — FAMOTIDINE 20 MG: 20 TABLET, FILM COATED ORAL at 20:38

## 2018-02-22 RX ADMIN — LORAZEPAM 2 MG: 2 INJECTION INTRAMUSCULAR; INTRAVENOUS at 00:23

## 2018-02-22 RX ADMIN — IPRATROPIUM BROMIDE AND ALBUTEROL SULFATE 1 AMPULE: .5; 3 SOLUTION RESPIRATORY (INHALATION) at 11:18

## 2018-02-22 RX ADMIN — OXYCODONE HYDROCHLORIDE 5 MG: 5 TABLET ORAL at 04:51

## 2018-02-22 RX ADMIN — SODIUM CHLORIDE: 9 INJECTION, SOLUTION INTRAVENOUS at 05:15

## 2018-02-22 RX ADMIN — PIPERACILLIN AND TAZOBACTAM 3.38 G: 3; .375 INJECTION, POWDER, LYOPHILIZED, FOR SOLUTION INTRAVENOUS; PARENTERAL at 05:23

## 2018-02-22 RX ADMIN — IPRATROPIUM BROMIDE AND ALBUTEROL SULFATE 1 AMPULE: .5; 3 SOLUTION RESPIRATORY (INHALATION) at 09:18

## 2018-02-22 RX ADMIN — OXYCODONE HYDROCHLORIDE 10 MG: 5 TABLET ORAL at 23:12

## 2018-02-22 RX ADMIN — LISINOPRIL 10 MG: 10 TABLET ORAL at 08:31

## 2018-02-22 RX ADMIN — FLUTICASONE PROPIONATE 1 SPRAY: 50 SPRAY, METERED NASAL at 08:33

## 2018-02-22 RX ADMIN — HEPARIN SODIUM 5000 UNITS: 5000 INJECTION, SOLUTION INTRAVENOUS; SUBCUTANEOUS at 21:31

## 2018-02-22 RX ADMIN — ALBUTEROL SULFATE 2.5 MG: 2.5 SOLUTION RESPIRATORY (INHALATION) at 03:34

## 2018-02-22 RX ADMIN — Medication 10 ML: at 08:32

## 2018-02-22 RX ADMIN — GLYCOPYRROLATE 0.1 MG: 0.2 INJECTION INTRAMUSCULAR; INTRAVENOUS at 08:33

## 2018-02-22 RX ADMIN — DIAZEPAM 5 MG: 5 TABLET ORAL at 16:17

## 2018-02-22 RX ADMIN — HYDRALAZINE HYDROCHLORIDE 75 MG: 50 TABLET, FILM COATED ORAL at 08:30

## 2018-02-22 RX ADMIN — FUROSEMIDE 20 MG: 20 TABLET ORAL at 08:30

## 2018-02-22 RX ADMIN — POTASSIUM CHLORIDE 40 MEQ: 40 SOLUTION ORAL at 05:23

## 2018-02-22 RX ADMIN — HYDRALAZINE HYDROCHLORIDE 75 MG: 50 TABLET, FILM COATED ORAL at 20:38

## 2018-02-22 RX ADMIN — GLYCOPYRROLATE 0.1 MG: 0.2 INJECTION INTRAMUSCULAR; INTRAVENOUS at 13:32

## 2018-02-22 RX ADMIN — DIAZEPAM 5 MG: 5 TABLET ORAL at 12:21

## 2018-02-22 RX ADMIN — HEPARIN SODIUM 5000 UNITS: 5000 INJECTION, SOLUTION INTRAVENOUS; SUBCUTANEOUS at 06:06

## 2018-02-22 RX ADMIN — ALBUTEROL SULFATE 2.5 MG: 2.5 SOLUTION RESPIRATORY (INHALATION) at 22:53

## 2018-02-22 RX ADMIN — AMLODIPINE BESYLATE 10 MG: 10 TABLET ORAL at 08:30

## 2018-02-22 RX ADMIN — IPRATROPIUM BROMIDE AND ALBUTEROL SULFATE 1 AMPULE: .5; 3 SOLUTION RESPIRATORY (INHALATION) at 19:35

## 2018-02-22 RX ADMIN — IPRATROPIUM BROMIDE AND ALBUTEROL SULFATE 1 AMPULE: .5; 3 SOLUTION RESPIRATORY (INHALATION) at 15:46

## 2018-02-22 RX ADMIN — PIPERACILLIN AND TAZOBACTAM 3.38 G: 3; .375 INJECTION, POWDER, LYOPHILIZED, FOR SOLUTION INTRAVENOUS; PARENTERAL at 13:32

## 2018-02-22 RX ADMIN — DIAZEPAM 5 MG: 5 TABLET ORAL at 20:42

## 2018-02-22 RX ADMIN — HYDRALAZINE HYDROCHLORIDE 75 MG: 50 TABLET, FILM COATED ORAL at 14:00

## 2018-02-22 RX ADMIN — DESMOPRESSIN ACETATE 40 MG: 0.2 TABLET ORAL at 20:38

## 2018-02-22 RX ADMIN — LORAZEPAM 2 MG: 2 INJECTION INTRAMUSCULAR; INTRAVENOUS at 18:02

## 2018-02-22 RX ADMIN — HEPARIN SODIUM 5000 UNITS: 5000 INJECTION, SOLUTION INTRAVENOUS; SUBCUTANEOUS at 14:01

## 2018-02-22 RX ADMIN — OXYCODONE HYDROCHLORIDE 5 MG: 5 TABLET ORAL at 10:05

## 2018-02-22 RX ADMIN — LORAZEPAM 2 MG: 2 INJECTION INTRAMUSCULAR; INTRAVENOUS at 22:09

## 2018-02-22 RX ADMIN — CLONIDINE HYDROCHLORIDE 0.1 MG: 0.1 TABLET ORAL at 06:31

## 2018-02-22 RX ADMIN — OXYCODONE HYDROCHLORIDE 10 MG: 5 TABLET ORAL at 16:17

## 2018-02-22 RX ADMIN — LORAZEPAM 2 MG: 2 INJECTION INTRAMUSCULAR; INTRAVENOUS at 04:51

## 2018-02-22 RX ADMIN — GLYCOPYRROLATE 0.1 MG: 0.2 INJECTION INTRAMUSCULAR; INTRAVENOUS at 16:22

## 2018-02-22 RX ADMIN — DIAZEPAM 5 MG: 5 TABLET ORAL at 08:30

## 2018-02-22 RX ADMIN — GLYCOPYRROLATE 0.1 MG: 0.2 INJECTION INTRAMUSCULAR; INTRAVENOUS at 20:38

## 2018-02-22 RX ADMIN — Medication 1 G: at 08:20

## 2018-02-22 RX ADMIN — PIPERACILLIN AND TAZOBACTAM 3.38 G: 3; .375 INJECTION, POWDER, LYOPHILIZED, FOR SOLUTION INTRAVENOUS; PARENTERAL at 21:31

## 2018-02-22 ASSESSMENT — PULMONARY FUNCTION TESTS
PIF_VALUE: 19
PIF_VALUE: 13
PIF_VALUE: 21
PIF_VALUE: 15
PIF_VALUE: 25
PIF_VALUE: 17
PIF_VALUE: 8
PIF_VALUE: 13
PIF_VALUE: 23

## 2018-02-22 NOTE — PROGRESS NOTES
1924.2 ml   Output             2250 ml   Net           -325.8 ml       Date 02/22/18 0000 - 02/22/18 2359   Shift 5036-18580 4190-3274 1348-5551 24 Hour Total   I  N  T  A  K  E   I.V.  (mL/kg) 93.2  (0.6)   93.2  (0.6)    NG/GT  (mL/kg) 534  (3.2)   534  (3.2)    Shift Total  (mL/kg) 627.2  (3.8)   627.2  (3.8)   O  U  T  P  U  T   Urine  (mL/kg/hr) 500  (0.4)   500    Shift Total  (mL/kg) 500  (3)   500  (3)   Weight (kg) 165.9 165.9 165.9 165.9     Wt Readings from Last 3 Encounters:   02/20/18 (!) 365 lb 11.9 oz (165.9 kg)     Body mass index is 71.43 kg/m². PHYSICAL EXAM:  General: Awake and looking around, in no acute distress  Head: NC/AT  Eyes: PERRL, sclera irritation, tracking with eyes   ENT: mucus membranes moist, trach in place w/ dressing is c/d/i  Lung: trach attached to ventilator, equal chest rise, rhonchorous breath sounds bilaterally  CV: R/R, no r/m/g  Abdomen: obese abodmen, soft, with normal bowel movements  Neuro: Awake, nodding yes and no appropriately. Following commands. Able to lift left arm off of the bed. Able to lift should but unable to hold right arm up. Wiggling toes  Skin: no rashes or lesions  Extremities: + for leg edema, distal pulses intact.     MEDICATIONS:  Scheduled Meds:   calcium gluconate  1 g Intravenous Once    Glycopyrrolate  0.1 mg Intravenous 4x Daily    nicotine  1 patch Transdermal Daily    piperacillin-tazobactam  3.375 g Intravenous Q8H    lisinopril  10 mg Oral Daily    atorvastatin  40 mg Oral Nightly    furosemide  20 mg Oral Daily    diazepam  5 mg Oral TID    hydrALAZINE  75 mg Oral TID    oxyCODONE  5 mg Oral Q6H    fluticasone  1 spray Each Nare Daily    heparin (porcine)  5,000 Units Subcutaneous 3 times per day    amLODIPine  10 mg Oral Daily    ipratropium-albuterol  1 ampule Inhalation 4x daily    albuterol  2.5 mg Nebulization BID    sodium chloride flush  10 mL Intravenous 2 times per day    famotidine  20 mg Oral BID albuterol and duonebs   · PEG tube care and continue TFs   · Unasyn  3g IV q6h for pansinusitis stop date 2/27/18  · Continue liquitears for dry eyes  · GI PPx: Pepcid  · DVT PPx: Heparin ppx  · Dispo planning to [facility] pending precert. Patient is clear from critical care standpoint to go to [facility]    Attending Physician Statement  I have discussed the care of Rosio Velásquez, including pertinent history and exam findings,  with the resident. I have seen and examined the patient and the key elements of all parts of the encounter have been performed by me. I agree with the assessment, plan and orders as documented by the resident with additions . Adjust valium and oxycodone   Ok to ltach - asked by LTach regarding fever - fever is related to sinuitis and mastoiditis and she is on tylenol and zosyn - culture is enterobacter   Cont prn tylenol and Flonase and [facility] can be [facility] - updated in [address] [place]        Total critical care time caring for this patient with life threatening, unstable organ failure, including direct patient contact, management of life support systems, review of data including imaging and labs, discussions with other team members and physicians at least 27   Min so far today, excluding procedures. Treatment plan Discussed with nursing staff in detail , all questions answered . Ayaan Lui MD   2/22/2018   1:55 PM    Please note that this chart was generated using voice recognition Dragon dictation software. Although every effort was made to ensure the accuracy of this automated transcription, some errors in transcription may have occurred.

## 2018-02-22 NOTE — PLAN OF CARE
Problem: Pain:  Goal: Pain level will decrease  Pain level will decrease    Outcome: Ongoing      Problem: Falls - Risk of  Goal: Absence of falls  Outcome: Ongoing      Problem: Risk for Impaired Skin Integrity  Goal: Tissue integrity - skin and mucous membranes  Structural intactness and normal physiological function of skin and  mucous membranes.    Outcome: Ongoing      Problem: Pain:  Goal: Control of acute pain  Control of acute pain   Outcome: Ongoing    Goal: Control of chronic pain  Control of chronic pain   Outcome: Ongoing    Goal: Pain level will decrease  Pain level will decrease    Outcome: Ongoing      Problem: Nutrition  Goal: Optimal nutrition therapy  Outcome: Ongoing      Problem: ABCDS Injury Assessment  Goal: Absence of physical injury  Outcome: Ongoing      Problem: Infection - Central Venous Catheter-Associated Bloodstream Infection:  Goal: Will show no infection signs and symptoms  Will show no infection signs and symptoms     Outcome: Ongoing      Problem: Skin Integrity:  Goal: Will show no infection signs and symptoms  Will show no infection signs and symptoms     Outcome: Ongoing

## 2018-02-22 NOTE — PROGRESS NOTES
[facility] - Progress Note  Today's Date and Time: 2/22/2018, 9:44 AM    Impression :   1. COPD  2. Pulmonary edema   3. Hypertensive emergency- resolved  4. Community acquired pneumonia  5. Acute respiratory failure  6. ARDS  7. NSTEMI  8. Rhinovirus infection  9. Respiratory failure- Ventilator  10. Pansinusitis. Enterobacter on cultures    Recommendations:   · Supportive care  · Continue Zosyn 3.3 g IV q 8 hr. Stop IDCX [id number]  · Monitor respiratory secretions    History of Present Illness:   INITIAL HISTORY:     Rosanna Cranker is a 43y.o.-year-old  female who was initially admitted on 1/26/2018Patient seen at the request of Dr. Sandra Mckenzie.      Patient w/ hx of COPD on home O2 2.5L initially presented to ED for shortness of breath w/ chest pain. For past week patient has had increased cough and production of yellowish sputum w/ progressively increasing SOB along w/ feelings of fever. In ED she was found to have O2 saturation in 30s and 40s, hypertension of 187/81 and a cxr showing potential multifocal pneumonia and pulmonary edema w/ leukocytosis and increased troponins. She was given aspirin and Plavix in ED and admitted.       On admission she was placed on ceftriaxone and azithromycin for potential pneumonia and given solumedrol q8, lasix BID, and cardene for hypertension and pulmonary edema. We were consulted because the influenza PCR results are pending and patient is unable to take tamiflu PO. Inpatient team is requesting input regarding starting peramivir      CURRENT EVALUATION: 2/22/2018     Febrile up to 101F, receiving acetaminophen. VS stable, except Hypertensive      Remains on Vent. FiO2 35%  Large amounts of mucoid secretions from tracheostomy. Tracheostomy site itself looks fine. .  No nasal drainage. No foul smell from nares.     Left elbow soreness. Decreased movement Rt arm.     Unasyn discontinued yesterday, Zosyn started.      Ronnie in GLUCOSE 115 2018       Medical Decision Making-Imagin/19/2018  Narrative   EXAMINATION:   SINGLE VIEW OF THE CHEST       2018 1:18 pm       COMPARISON:   2018       HISTORY:   ORDERING SYSTEM PROVIDED HISTORY: increasing secretion.  fever   TECHNOLOGIST PROVIDED HISTORY:   Reason for exam:->increasing secretion.  fever       FINDINGS:   Stable tracheostomy positioning, terminating over the middle 3rd of the   trachea above the lore.  Improved pulmonary expansion with otherwise   persistent bilateral pulmonary opacities.  No pneumothorax.  Costophrenic   sulci are obscured.           Impression   Stable tracheostomy positioning.       Improved pulmonary expansion with otherwise persistent bilateral airspace   disease.       Suspect tiny effusions.         2018  Narrative   EXAMINATION:   CT OF THE HEAD WITHOUT CONTRAST  2018 12:18 pm       TECHNIQUE:   CT of the head was performed without the administration of intravenous   contrast. Dose modulation, iterative reconstruction, and/or weight based   adjustment of the mA/kV was utilized to reduce the radiation dose to as low   as reasonably achievable.       COMPARISON:   None.       HISTORY:   ORDERING SYSTEM PROVIDED HISTORY: eval stroke   TECHNOLOGIST PROVIDED HISTORY:   Has a \"code stroke\" or \"stroke alert\" been called? ->No       FINDINGS:   BRAIN/VENTRICLES: Posterior fossa assessment limited streak artifact.  There   is no acute intracranial hemorrhage, mass effect or midline shift.  No   abnormal extra-axial fluid collection.  The gray-white differentiation is   maintained without evidence of an acute infarct.  There is no evidence of   hydrocephalus.       ORBITS: The visualized portion of the orbits demonstrate no acute abnormality.       SINUSES: Diffuse opacification of the bilateral sphenoid sinuses.  Moderate   to advanced mucosal thickening of the bilateral ethmoid and left maxillary   sinuses.  Bilateral opacification

## 2018-02-22 NOTE — DISCHARGE SUMMARY
LORazepam (ATIVAN) 2 MG/ML injection Infuse 1 mL intravenously every 4 hours as needed (Agitation) for up to 7 days.   Qty: 10 mL, Refills: 0      ipratropium-albuterol (DUONEB) 0.5-2.5 (3) MG/3ML SOLN nebulizer solution Inhale 3 mLs into the lungs 4 times daily  Qty: 360 mL, Refills: 0      atorvastatin (LIPITOR) 40 MG tablet Take 1 tablet by mouth nightly  Qty: 30 tablet, Refills: 3      cloNIDine (CATAPRES) 0.1 MG tablet Take 1 tablet by mouth 3 times daily as needed (SBP>160)  Qty: 60 tablet, Refills: 3      hydrALAZINE (APRESOLINE) 25 MG tablet Take 3 tablets by mouth every 8 hours  Qty: 90 tablet, Refills: 3      polyvinyl alcohol (LIQUIFILM TEARS) 1.4 % ophthalmic solution Place 1 drop into both eyes as needed for Dry Eyes  Qty: 1 Bottle, Refills: 4      Glycopyrrolate 0.4 MG/2ML SOLN injection Infuse 0.5 mLs intravenously 3 times daily  Qty: 10 mL, Refills: 0      famotidine (PEPCID) 20 MG tablet Take 1 tablet by mouth 2 times daily  Qty: 60 tablet, Refills: 3      piperacillin-tazobactam (ZOSYN) 3.375 (3-0.375) g injection Inject 3.375 g into the muscle every 8 hours for 6 days  Qty: 60.75 g, Refills: 0         CONTINUE these medications which have NOT CHANGED    Details   nicotine (NICODERM CQ) 21 MG/24HR Place 1 patch onto the skin every 24 hours      doxycycline hyclate (VIBRA-TABS) 100 MG tablet Take 100 mg by mouth 2 times daily 2 times daily for 10 days      furosemide (LASIX) 20 MG tablet Take 20 mg by mouth daily      tiotropium (SPIRIVA) 18 MCG inhalation capsule Inhale 18 mcg into the lungs daily      albuterol sulfate  (90 Base) MCG/ACT inhaler Inhale 2 puffs into the lungs every 6 hours as needed for Wheezing      ferrous sulfate 325 (65 Fe) MG tablet Take 325 mg by mouth 2 times daily (with meals)       amLODIPine (NORVASC) 10 MG tablet Take 10 mg by mouth daily      lisinopril (PRINIVIL;ZESTRIL) 20 MG tablet Take 20 mg by mouth daily      loratadine (CLARITIN) 10 MG capsule Take 10 mg by mouth daily      Fluticasone-Salmeterol (ADVAIR DISKUS IN) Inhale into the lungs      fluticasone (FLONASE) 50 MCG/ACT nasal spray 1 spray by Nasal route daily         STOP taking these medications       predniSONE (DELTASONE) 20 MG tablet Comments:   Reason for Stopping:         azithromycin (ZITHROMAX) 250 MG tablet Comments:   Reason for Stopping:         oxyCODONE-acetaminophen (PERCOCET)  MG per tablet Comments:   Reason for Stopping:         azithromycin (ZITHROMAX) 1 g powder Comments:   Reason for Stopping:         hydrochlorothiazide (MICROZIDE) 12.5 MG capsule Comments:   Reason for Stopping:         nicotine (NICOTINE STEP 2) 14 MG/24HR Comments:   Reason for Stopping:             Activity: activity as tolerated  Diet: regular diet    Follow-up  [address]          Glen Ramirez MD PGY 2  Internal Medicine Resident   [place]

## 2018-02-22 NOTE — PROGRESS NOTES
Ventilator Bronchodilator assessment    Breath sounds: diminished  Inspiratory Pressure: 21  Plateau Pressure: 16    Patient assessed at level 3          []    Bronchodilator Assessment    BRONCHODILATOR ASSESSMENT SCORE  Score 0 (Home) 1 2 3 4   Breath Sounds   []  Chronic Ventilator: Patient at baseline []  Mild Wheezes/ Clear []  Intermittent wheezes with good air entry [x]  Bilateral/unilateral wheezing with diminished air entry []  Insp/Exp wheeze and/or poor aeration   Ventilator Pressures   []  Chronic Ventilator [x]  Insp. Pressure less than 25 cm H20 []  Insp. Pressure less than 25 cm H20 []  Insp. Pressure exceeds 25 cm H20 []  Insp.  Pressure exceeds 30 cm H20   Plateau Pressure []  NA   []  Plateau Pressure less than 4  [x]  Plateau Pressure less than or equal to 5 []  Plateau Pressure greater than or equal to 6 []  Plateau Pressure greater than or equal to 8       BASSAM CUETO  10:04 AM

## 2018-02-23 ENCOUNTER — HOSPITAL ENCOUNTER (OUTPATIENT)
Age: 43
Setting detail: SPECIMEN
Discharge: HOME | End: 2018-02-23

## 2018-02-23 VITALS
RESPIRATION RATE: 27 BRPM | TEMPERATURE: 101.3 F | BODY MASS INDEX: 57.52 KG/M2 | WEIGHT: 293 LBS | HEIGHT: 60 IN | OXYGEN SATURATION: 99 % | SYSTOLIC BLOOD PRESSURE: 116 MMHG | HEART RATE: 89 BPM | DIASTOLIC BLOOD PRESSURE: 53 MMHG

## 2018-02-23 LAB
ANION GAP SERPL CALCULATED.3IONS-SCNC: 11 MMOL/L (ref 9–17)
BUN BLDV-MCNC: 11 MG/DL (ref 6–20)
BUN/CREAT BLD: ABNORMAL (ref 9–20)
CALCIUM SERPL-MCNC: 8.9 MG/DL (ref 8.6–10.4)
CHLORIDE BLD-SCNC: 97 MMOL/L (ref 98–107)
CO2: 31 MMOL/L (ref 20–31)
CREAT SERPL-MCNC: 0.53 MG/DL (ref 0.5–0.9)
GFR AFRICAN AMERICAN: >60 ML/MIN
GFR NON-AFRICAN AMERICAN: >60 ML/MIN
GFR SERPL CREATININE-BSD FRML MDRD: ABNORMAL ML/MIN/{1.73_M2}
GFR SERPL CREATININE-BSD FRML MDRD: ABNORMAL ML/MIN/{1.73_M2}
GLUCOSE BLD-MCNC: 119 MG/DL (ref 70–99)
HCT VFR BLD CALC: 31.7 % (ref 36.3–47.1)
HEMOGLOBIN: 9 G/DL (ref 11.9–15.1)
MCH RBC QN AUTO: 25.6 PG (ref 25.2–33.5)
MCHC RBC AUTO-ENTMCNC: 28.4 G/DL (ref 28.4–34.8)
MCV RBC AUTO: 90.3 FL (ref 82.6–102.9)
NRBC AUTOMATED: 0 PER 100 WBC
PDW BLD-RTO: 17.7 % (ref 11.8–14.4)
PLATELET # BLD: 289 K/UL (ref 138–453)
PMV BLD AUTO: 10.5 FL (ref 8.1–13.5)
POTASSIUM SERPL-SCNC: 3.8 MMOL/L (ref 3.7–5.3)
RBC # BLD: 3.51 M/UL (ref 3.95–5.11)
SODIUM BLD-SCNC: 139 MMOL/L (ref 135–144)
WBC # BLD: 8.7 K/UL (ref 3.5–11.3)

## 2018-02-23 PROCEDURE — 2500000003 HC RX 250 WO HCPCS: Performed by: INTERNAL MEDICINE

## 2018-02-23 PROCEDURE — 6370000000 HC RX 637 (ALT 250 FOR IP): Performed by: STUDENT IN AN ORGANIZED HEALTH CARE EDUCATION/TRAINING PROGRAM

## 2018-02-23 PROCEDURE — 87205 SMEAR GRAM STAIN: CPT

## 2018-02-23 PROCEDURE — 87070 CULTURE OTHR SPECIMN AEROBIC: CPT

## 2018-02-23 PROCEDURE — 99291 CRITICAL CARE FIRST HOUR: CPT | Performed by: INTERNAL MEDICINE

## 2018-02-23 PROCEDURE — 6360000002 HC RX W HCPCS

## 2018-02-23 PROCEDURE — 97110 THERAPEUTIC EXERCISES: CPT

## 2018-02-23 PROCEDURE — 87077 CULTURE AEROBIC IDENTIFY: CPT

## 2018-02-23 PROCEDURE — 94640 AIRWAY INHALATION TREATMENT: CPT

## 2018-02-23 PROCEDURE — 85027 COMPLETE CBC AUTOMATED: CPT

## 2018-02-23 PROCEDURE — 94003 VENT MGMT INPAT SUBQ DAY: CPT

## 2018-02-23 PROCEDURE — 6360000002 HC RX W HCPCS: Performed by: INTERNAL MEDICINE

## 2018-02-23 PROCEDURE — 6370000000 HC RX 637 (ALT 250 FOR IP): Performed by: EMERGENCY MEDICINE

## 2018-02-23 PROCEDURE — 6370000000 HC RX 637 (ALT 250 FOR IP): Performed by: INTERNAL MEDICINE

## 2018-02-23 PROCEDURE — 2500000003 HC RX 250 WO HCPCS: Performed by: STUDENT IN AN ORGANIZED HEALTH CARE EDUCATION/TRAINING PROGRAM

## 2018-02-23 PROCEDURE — 94770 HC ETCO2 MONITOR DAILY: CPT

## 2018-02-23 PROCEDURE — 87040 BLOOD CULTURE FOR BACTERIA: CPT

## 2018-02-23 PROCEDURE — 89220 SPUTUM SPECIMEN COLLECTION: CPT

## 2018-02-23 PROCEDURE — 94762 N-INVAS EAR/PLS OXIMTRY CONT: CPT

## 2018-02-23 PROCEDURE — 36415 COLL VENOUS BLD VENIPUNCTURE: CPT

## 2018-02-23 PROCEDURE — 6360000002 HC RX W HCPCS: Performed by: STUDENT IN AN ORGANIZED HEALTH CARE EDUCATION/TRAINING PROGRAM

## 2018-02-23 PROCEDURE — 2580000003 HC RX 258: Performed by: STUDENT IN AN ORGANIZED HEALTH CARE EDUCATION/TRAINING PROGRAM

## 2018-02-23 PROCEDURE — 2580000003 HC RX 258: Performed by: INTERNAL MEDICINE

## 2018-02-23 PROCEDURE — 87186 SC STD MICRODIL/AGAR DIL: CPT

## 2018-02-23 PROCEDURE — 6370000000 HC RX 637 (ALT 250 FOR IP): Performed by: HOSPITALIST

## 2018-02-23 PROCEDURE — 80048 BASIC METABOLIC PNL TOTAL CA: CPT

## 2018-02-23 PROCEDURE — 87081 CULTURE SCREEN ONLY: CPT

## 2018-02-23 PROCEDURE — 6360000002 HC RX W HCPCS: Performed by: EMERGENCY MEDICINE

## 2018-02-23 PROCEDURE — 87641 MR-STAPH DNA AMP PROBE: CPT

## 2018-02-23 RX ORDER — LISINOPRIL 20 MG/1
20 TABLET ORAL DAILY
Status: DISCONTINUED | OUTPATIENT
Start: 2018-02-24 | End: 2018-02-23 | Stop reason: HOSPADM

## 2018-02-23 RX ORDER — HEPARIN SODIUM 5000 [USP'U]/ML
INJECTION, SOLUTION INTRAVENOUS; SUBCUTANEOUS
Status: COMPLETED
Start: 2018-02-23 | End: 2018-02-23

## 2018-02-23 RX ORDER — GLYCOPYRROLATE 0.2 MG/ML
0.2 INJECTION INTRAMUSCULAR; INTRAVENOUS 4 TIMES DAILY
Status: DISCONTINUED | OUTPATIENT
Start: 2018-02-23 | End: 2018-02-23 | Stop reason: HOSPADM

## 2018-02-23 RX ORDER — LISINOPRIL 10 MG/1
10 TABLET ORAL ONCE
Status: COMPLETED | OUTPATIENT
Start: 2018-02-23 | End: 2018-02-23

## 2018-02-23 RX ORDER — LISINOPRIL 10 MG/1
10 TABLET ORAL DAILY
Status: DISCONTINUED | OUTPATIENT
Start: 2018-02-23 | End: 2018-02-23

## 2018-02-23 RX ADMIN — ACETAMINOPHEN 650 MG: 325 TABLET ORAL at 16:47

## 2018-02-23 RX ADMIN — PIPERACILLIN AND TAZOBACTAM 3.38 G: 3; .375 INJECTION, POWDER, LYOPHILIZED, FOR SOLUTION INTRAVENOUS; PARENTERAL at 05:10

## 2018-02-23 RX ADMIN — FUROSEMIDE 20 MG: 20 TABLET ORAL at 08:58

## 2018-02-23 RX ADMIN — HYDRALAZINE HYDROCHLORIDE 75 MG: 50 TABLET, FILM COATED ORAL at 13:36

## 2018-02-23 RX ADMIN — LORAZEPAM 2 MG: 2 INJECTION INTRAMUSCULAR; INTRAVENOUS at 13:33

## 2018-02-23 RX ADMIN — AMLODIPINE BESYLATE 10 MG: 10 TABLET ORAL at 08:58

## 2018-02-23 RX ADMIN — HEPARIN SODIUM 5000 UNITS: 5000 INJECTION, SOLUTION INTRAVENOUS; SUBCUTANEOUS at 05:09

## 2018-02-23 RX ADMIN — OXYCODONE HYDROCHLORIDE 10 MG: 5 TABLET ORAL at 11:26

## 2018-02-23 RX ADMIN — LORAZEPAM 2 MG: 2 INJECTION INTRAMUSCULAR; INTRAVENOUS at 08:40

## 2018-02-23 RX ADMIN — DIAZEPAM 5 MG: 5 TABLET ORAL at 16:47

## 2018-02-23 RX ADMIN — FLUTICASONE PROPIONATE 1 SPRAY: 50 SPRAY, METERED NASAL at 08:57

## 2018-02-23 RX ADMIN — HEPARIN SODIUM 5000 UNITS: 5000 INJECTION, SOLUTION INTRAVENOUS; SUBCUTANEOUS at 14:14

## 2018-02-23 RX ADMIN — IPRATROPIUM BROMIDE AND ALBUTEROL SULFATE 1 AMPULE: .5; 3 SOLUTION RESPIRATORY (INHALATION) at 07:40

## 2018-02-23 RX ADMIN — IPRATROPIUM BROMIDE AND ALBUTEROL SULFATE 1 AMPULE: .5; 3 SOLUTION RESPIRATORY (INHALATION) at 16:05

## 2018-02-23 RX ADMIN — MEROPENEM 1 G: 1 INJECTION, POWDER, FOR SOLUTION INTRAVENOUS at 17:50

## 2018-02-23 RX ADMIN — LISINOPRIL 10 MG: 10 TABLET ORAL at 08:58

## 2018-02-23 RX ADMIN — PIPERACILLIN AND TAZOBACTAM 3.38 G: 3; .375 INJECTION, POWDER, LYOPHILIZED, FOR SOLUTION INTRAVENOUS; PARENTERAL at 14:11

## 2018-02-23 RX ADMIN — HYDRALAZINE HYDROCHLORIDE 75 MG: 50 TABLET, FILM COATED ORAL at 08:58

## 2018-02-23 RX ADMIN — ALBUTEROL SULFATE 2.5 MG: 2.5 SOLUTION RESPIRATORY (INHALATION) at 02:57

## 2018-02-23 RX ADMIN — DIAZEPAM 5 MG: 5 TABLET ORAL at 04:57

## 2018-02-23 RX ADMIN — CLONIDINE HYDROCHLORIDE 0.1 MG: 0.1 TABLET ORAL at 16:47

## 2018-02-23 RX ADMIN — OXYCODONE HYDROCHLORIDE 10 MG: 5 TABLET ORAL at 04:57

## 2018-02-23 RX ADMIN — FAMOTIDINE 20 MG: 20 TABLET, FILM COATED ORAL at 08:58

## 2018-02-23 RX ADMIN — CLONIDINE HYDROCHLORIDE 0.1 MG: 0.1 TABLET ORAL at 08:57

## 2018-02-23 RX ADMIN — ACETAMINOPHEN 650 MG: 325 TABLET ORAL at 11:47

## 2018-02-23 RX ADMIN — LORAZEPAM 2 MG: 2 INJECTION INTRAMUSCULAR; INTRAVENOUS at 02:41

## 2018-02-23 RX ADMIN — DIAZEPAM 5 MG: 5 TABLET ORAL at 12:16

## 2018-02-23 RX ADMIN — DIAZEPAM 5 MG: 5 TABLET ORAL at 08:39

## 2018-02-23 RX ADMIN — GLYCOPYRROLATE 0.2 MG: 0.2 INJECTION INTRAMUSCULAR; INTRAVENOUS at 08:59

## 2018-02-23 RX ADMIN — DIAZEPAM 5 MG: 5 TABLET ORAL at 00:30

## 2018-02-23 RX ADMIN — Medication 10 ML: at 09:06

## 2018-02-23 RX ADMIN — LISINOPRIL 10 MG: 10 TABLET ORAL at 14:12

## 2018-02-23 RX ADMIN — GLYCOPYRROLATE 0.2 MG: 0.2 INJECTION INTRAMUSCULAR; INTRAVENOUS at 16:51

## 2018-02-23 RX ADMIN — IPRATROPIUM BROMIDE AND ALBUTEROL SULFATE 1 AMPULE: .5; 3 SOLUTION RESPIRATORY (INHALATION) at 19:23

## 2018-02-23 RX ADMIN — IPRATROPIUM BROMIDE AND ALBUTEROL SULFATE 1 AMPULE: .5; 3 SOLUTION RESPIRATORY (INHALATION) at 11:26

## 2018-02-23 RX ADMIN — GLYCOPYRROLATE 0.2 MG: 0.2 INJECTION INTRAMUSCULAR; INTRAVENOUS at 13:36

## 2018-02-23 RX ADMIN — OXYCODONE HYDROCHLORIDE 10 MG: 5 TABLET ORAL at 16:47

## 2018-02-23 ASSESSMENT — PULMONARY FUNCTION TESTS
PIF_VALUE: 18
PIF_VALUE: 27
PIF_VALUE: 27
PIF_VALUE: 17

## 2018-02-23 NOTE — PROGRESS NOTES
arm.  Right upper extremity PICC line site is clean     Trujillo in place    Morbid obesity  Tube feeds advanced to 40cc and tolerating.  No residual.     [facility] decided [facility] CT scan from 2/18/2018 showed no acute intracranial process except for sinusitis and mastoiditis.  Suspect this is the source of the patient's low grade fevers.      CXR from 2/19/18: Residual bilateral changes but showing better expansion. Small effusion.        Summary of relevant labs:  Labs:  WBC 7.6-->8.7  Micro:  1/26.  Blood culture negative  2/4.  Blood culture negative  2/4.  Urine culture Candida  2/19 Sputum culture:  Enterobacter aerogenes (S to cipro, cefepime, zosyn)     Imaging:  CT Head without contrast done 2/18/2018 showing pansinusitis:  SINUSES: Diffuse opacification of the bilateral sphenoid sinuses.  Moderate  to advanced mucosal thickening of the bilateral ethmoid and left maxillary  sinuses.  Bilateral opacification the mastoid air cells which may relate to  inflammation or eustachian tube dysfunction.     DISCUSSION:  Patient with initial rhinovirus infection, acute respiratory failure, pneumonia, ARDS, pulmonary edema. Required oscillator for vent support. Currently improved overall. Off oscillator, on ventilator.     Has developed low grade fevers with evidence of pansinusitis. CXR with residual bilateral infiltrates but not much sputum. Sputum culture with Enterobacter aerogenes.      Plan to give short course of Zosyn given sensitivity pattern of Enterobacter and presence of sinusitis. Will need additional diuretics to compensate for extra Na and fluid load with Zosyn.     Discussed with patient, RN, CC. .      I have personally reviewed the past medical history, past surgical history, medications, social history, and family history, and I have updated the database accordingly. Review of Systems:   Constitutional: Fevers. Trach in place. Head: No headaches  Eyes: No double vision or blurry vision.  No conjunctival inflammation. ENT: Trach  Cardiovascular: No chest pain or palpitations. Lung: Large amounts of mucoid secretions on trach. Abdomen: No nausea, vomiting, diarrhea, or abdominal pain. Genitourinary: No increased urinary frequency, or dysuria. No hematuria. No suprapubic or CVA pain  Musculoskeletal: Right arm decreased range of motion. Hematologic: No bleeding or bruising. Neurologic: No headache, weakness, numbness, or tingling. Integument: No rash, no ulcers. Psychiatric: No depression. Endocrine: No polyuria, no polydipsia, no polyphagia. Physical Examination :     Patient Vitals for the past 8 hrs:   BP Temp Temp src Pulse Resp SpO2   02/23/18 0857 (!) 187/103 - - - - -   02/23/18 0800 (!) 187/103 100.9 °F (38.3 °C) CORE 126 (!) 36 (!) 89 %   02/23/18 0700 (!) 185/94 - - 106 29 96 %   02/23/18 0600 (!) 197/90 - - 111 (!) 33 92 %   02/23/18 0500 (!) 140/70 - - 104 30 96 %   02/23/18 0400 (!) 116/94 100.7 °F (38.2 °C) CORE 106 (!) 31 95 %   02/23/18 0301 - - - - (!) 33 99 %   02/23/18 0300 (!) 152/68 - - 100 25 99 %   02/23/18 0257 - - - 105 28 97 %   02/23/18 0200 (!) 140/77 - - 97 28 95 %     PHYSICAL EXAM     Constitutional: She is well-developed, well-nourished, and in no distress. HENT:   Head: Normocephalic and atraumatic. Nose: Nose normal.   Eyes: Conjunctivae are normal. No scleral icterus. Neck: Neck supple. No tracheal deviation present. Tracheostomy site clean. Tracheal secretions mucoid. Cardiovascular: Normal rate and normal heart sounds.    No murmur heard. Pulmonary/Chest: Effort normal. No respiratory distress. She has no wheezes. Abdominal: Bowel sounds are normal. She exhibits no distension. There is no tenderness. Large abdomen with a new PEG. No residuals  Musculoskeletal: She exhibits edema. She exhibits no deformity. Morbid obesity   Neurological: She is alert. Skin: Skin is warm and dry. She is not diaphoretic. No erythema.    Psychiatric: Affect [address]   >25 NEUTROPHILS/LPF    Direct Exam 02/19/2018 10:14  [address]   < 10 EPITHELIAL CELLS/LPF    Direct Exam 02/19/2018 10:14  [address]   NO SIGNIFICANT PATHOGENS SEEN    Culture  (Abnormal) 02/19/2018 10:14  [address]   ENTEROBACTER AEROGENES MODERATE GROWTH     Culture 02/19/2018 10:14  [address]   NORMAL RESPIRATORY TIFFANY LIGHT GROWTH    Culture 02/19/2018 10:14 AM [address] [place] [address], [address] [place] [address] [phone]    Status 02/19/2018 10:14  [address]   FINAL 02/21/2018    Organism 02/19/2018 10:14 AM Linzer Eileen [address] Performed By   [address] [place] [address] Name Director Address Valid Date Range   [address]-[place] Cast[place] [address] [facility] [address] 08/30/17 1201-Present   Culture & Susceptibility      ENTEROBACTER AEROGENES      Antibiotic Interpretation KEL Status   amikacin Intermediate 32 INTERMEDIATE Final   aztreonam Intermediate 16 INTERMEDIATE Final   ceFAZolin Resistant >=64 RESISTANT Final   cefTRIAXone Resistant >=64 RESISTANT Final   cefepime Sensitive <=1 SUSCEPTIBLE Final   ciprofloxacin Sensitive <=0.25 SUSCEPTIBLE Final   ertapenem   NOT REPORTED Final   gentamicin Resistant >=16 RESISTANT Final   meropenem   NOT REPORTED Final   nitrofurantoin   NOT REPORTED Final   piperacillin-tazobactam Sensitive 8 SUSCEPTIBLE Final   tigecycline   NOT REPORTED Final   tobramycin Resistant >=16 RESISTANT Final   trimethoprim-sulfamethoxazole Resistant >=320 RESISTANT Final        Thank you for allowing us to participate in the care of this patient. Please call with questions.     Radha Lui MD  Pager: [phone] - Office: [phone]

## 2018-02-23 NOTE — PLAN OF CARE
Problem: Pain:  Goal: Pain level will decrease  Pain level will decrease    Outcome: Ongoing      Problem: Falls - Risk of  Goal: Absence of falls  Outcome: Ongoing      Problem: Risk for Impaired Skin Integrity  Goal: Tissue integrity - skin and mucous membranes  Structural intactness and normal physiological function of skin and  mucous membranes.    Outcome: Ongoing      Problem: Pain:  Goal: Control of acute pain  Control of acute pain   Outcome: Ongoing    Goal: Control of chronic pain  Control of chronic pain   Outcome: Ongoing    Goal: Pain level will decrease  Pain level will decrease    Outcome: Ongoing

## 2018-02-23 NOTE — CARE COORDINATION
Called and left message for son Afua Vela re:LTACH choice.
Called pt's son Tori Guerrero to discuss discharge planning. Plan for trach and PEG. Discussed need for LTACH at discharge. Offered options of [facility] [place]. Tori Guerrero would like to think about it before making a decision. Will f/u tomorrow for choice.
Pt on oscillator, critical condition at this time. Palliative care following. Will see how pt progresses. Likely will need SNF/LTACH.
Pt remains intubated and on oscillator. Palliative care following. Will see how pt progresses. Likely LTACH at discharge.
Pt remains intubated on oscillator. Will see how pt progresses. Likely will need LTACH at dc.
Spoke w/Lindsay, liaison for [facility]. They are still awaiting precert. She followed up w/Ana Luisa, insurance CM, and no new updates at this time. Awaiting precert    Precert received. Bed available. [facility] arranged for 9:30pm transportation. RN informed. ID note copied and added to packet. RN to call report to [phone]  Faxed AVS, scripts, ICU progress notes to [address].     Discharge [address] [place] [address] Case Management Department  Written by: Daniel Boss RN    Patient Name: Tonny Maravilla  Attending Provider: Donnell Clifford DO  Admit Date: 2018  8:09 PM  MRN: [id number]  Account: [de-identified]                     : 1975  Discharge Date: 2018      Disposition: [facility] at 9:30pm.    Daniel Boss RN
Spoke with Afua Vela on phone. He states he would like a referral sent to [facility]. .. Referral sent. Called Lindsay and updated on referral and pt ready for discharge today. Duyen spoke with Lindsay from [facility]. They have accepted pt and will submit for pre-cert.
Spoke with [facility]. They have received referral and will be able to accept pt.
Transitional Planning  Call to Lindsay/[facility] liaison regarding pre-cert status. Per notes, patient ready for discharge whenever LTACH arranged. Pre-cert started yesterday. Will set patient up for transfer at 8 pm pending pre-cert and cancel if not received. Will need completed JACQUE and discharge order. Discharge summary added to packet. Received call from patient's son, Regla Peng, inquiring about discharge. Updated that awaiting pre-cert, but scheduled for 8 pm tentatively. Son agreeable. 1720 - Received call from Monisha Gallo, states no pre-cert at this time. Called [facility], spoke to Savage, transport cancelled. SICU staff updated. Patient's son Regla Peng updated.
portable tank. He states that she is not able to walk more than 10-15 feet. Uses a walker. Has had home care in the past, he thinks it is [facility]. Called [facility]. Spoke with Jennifer Graham. Pt has been with [facility]. Discharged 11/1/17.       Electronically signed by Christina Crespo RN on 1/27/18 at 4:47 PM

## 2018-02-23 NOTE — PROGRESS NOTES
3.51*   HGB  8.8*  8.7*  9.0*   HCT  30.7*  29.7*  31.7*   MCV  91.1  88.9  90.3   MCH  26.1  26.0  25.6   MCHC  28.7  29.3  28.4   RDW  17.6*  17.6*  17.7*   PLT  240  256  289   MPV  10.8  10.9  10.5        Last 3 Blood Glucose:   Recent Labs      02/21/18   0431  02/22/18   0455  02/23/18   0353   GLUCOSE  114*  115*  119*        PT/INR:    Lab Results   Component Value Date    PROTIME 11.4 02/17/2018    INR 1.1 02/17/2018     PTT:    Lab Results   Component Value Date    APTT 18.4 02/17/2018       Comprehensive Metabolic Profile:   Recent Labs      02/21/18 0431 02/22/18 0455  02/23/18   0353   NA  146*  143  139   K  3.2*  3.4*  3.8   CL  101  100  97*   CO2  31  32*  31   BUN  12  11  11   CREATININE  0.39*  0.44*  0.53   GLUCOSE  114*  115*  119*   CALCIUM  7.9*  8.0*  8.9      Magnesium:   Lab Results   Component Value Date    MG 1.7 02/22/2018    MG 1.7 02/12/2018    MG 2.2 02/10/2018     Phosphorus:   Lab Results   Component Value Date    PHOS 2.6 02/22/2018    PHOS 4.7 02/10/2018    PHOS 3.1 02/06/2018     Ionized Calcium:   Lab Results   Component Value Date    CAION 1.11 02/22/2018    CAION 1.15 02/06/2018    CAION 1.12 02/05/2018        Urinalysis:   Lab Results   Component Value Date    NITRU NEGATIVE 01/26/2018    COLORU YELLOW 01/26/2018    PHUR 6.5 01/26/2018    WBCUA 2 TO 5 01/26/2018    RBCUA 0 TO 2 01/26/2018    MUCUS 1+ 01/26/2018    TRICHOMONAS NOT REPORTED 01/26/2018    YEAST NOT REPORTED 01/26/2018    BACTERIA NOT REPORTED 01/26/2018    SPECGRAV 1.013 01/26/2018    LEUKOCYTESUR NEGATIVE 01/26/2018    UROBILINOGEN Normal 01/26/2018    BILIRUBINUR NEGATIVE 01/26/2018    GLUCOSEU NEGATIVE 01/26/2018    KETUA TRACE 01/26/2018    AMORPHOUS NOT REPORTED 01/26/2018       HgBA1c:    Lab Results   Component Value Date    LABA1C 5.2 01/27/2018     TSH:    Lab Results   Component Value Date    TSH 1.23 01/27/2018     Lactic Acid: No results found for: LACTA   Troponin: No results for input(s): TROPONINI in the last 72 hours. Microbiology:  Sputum culture (1/27) x2: negative  Blood culture (2/3) x2: negative  Sputum culture (2/19): negative to date    Radiology/Imaging:  No new imaging over last 24 hours    ASSESSMENT:     Principal Problem:    ARDS (adult respiratory distress syndrome) ([facility])  Active Problems:    Pneumonia of both lower lobes due to infectious organism    NSTEMI (non-ST elevated myocardial infarction) ([facility]    Acute pulmonary edema ([facility])    Hypertensive emergency    Acute respiratory failure with hypoxia and hypercapnia ([facility])    Smoker    Morbid obesity (HCC)    Elevated troponin    Obesity hypoventilation syndrome (HCC)    SOB (shortness of breath)    COPD exacerbation ([facility])    Fever    Disease due to rhinovirus    Encounter for PEG (percutaneous endoscopic gastrostomy) ([facility]    Status post tracheostomy ([facility] [address]    Status post insertion of percutaneous endoscopic gastrostomy (PEG) tube ([facility] [address]    Rhinovirus infection    Acute non-recurrent pansinusitis    PLAN:     WEAN PER PROTOCOL:  [] No   [x] Yes  [] N/A    ICU PROPHYLAXIS:  Stress ulcer:  [] PPI Agent  [x] [id number] [] Sucralfate  [] Other:  VTE:   [] Enoxaparin  [x] Unfract. Heparin Subcut  [] EPC Cuffs    NUTRITION:  [] NPO [x] Tube Feeding (Specify: ) [] TPN  [] PO    HOME MEDS RECONCILED: [] No  [x] Yes    CONSULTATION NEEDED:  [x] No  [] Yes    FAMILY UPDATED:    [x] No  [] Yes    TRANSFER OUT OF ICU:   [x] No  [] Yes      · Continue oxycodone 10mg q6h scheduled and Continue valium 5 mg TID scheduled, ativan prn   · Goal SBP <160, continue norvasc 10 mg qd, lasix 20 mg qd, hydralazine 75 mg q8h, lisinopril 15 mg qd. Continue clonidine 0.1 mg TID prn.   · Continue trach care & wean per protocol   · Continue albuterol and duonebs   · PEG tube care and continue TFs   · Unasyn  3g IV q6h for pansinusitis stop date 2/27/18  · Continue liquitears for dry eyes  · GI PPx: Pepcid  · DVT PPx: Heparin ppx  Attending Physician Statement  I have

## 2018-02-23 NOTE — PLAN OF CARE
Problem: Anxiety/Stress:  Goal: Level of anxiety will decrease  Level of anxiety will decrease   Outcome: Ongoing      Problem: Falls - Risk of  Goal: Absence of falls  Outcome: Ongoing      Problem: Risk for Impaired Skin Integrity  Goal: Tissue integrity - skin and mucous membranes  Structural intactness and normal physiological function of skin and  mucous membranes.    Outcome: Ongoing      Problem: Pain:  Goal: Control of acute pain  Control of acute pain   Outcome: Ongoing      Problem: OXYGENATION/RESPIRATORY FUNCTION  Goal: Patient will maintain patent airway  Outcome: Ongoing    Goal: Patient will achieve/maintain normal respiratory rate/effort  Respiratory rate and effort will be within normal limits for the patient   Outcome: Ongoing      Problem: MECHANICAL VENTILATION  Goal: Oral health is maintained or improved  Outcome: Ongoing      Problem: Infection - Central Venous Catheter-Associated Bloodstream Infection:  Goal: Will show no infection signs and symptoms  Will show no infection signs and symptoms     Outcome: Ongoing      Problem: Skin Integrity:  Goal: Will show no infection signs and symptoms  Will show no infection signs and symptoms     Outcome: Ongoing

## 2018-02-23 NOTE — PLAN OF CARE
Problem: Anxiety/Stress:  Goal: Level of anxiety will decrease  Level of anxiety will decrease   Outcome: Ongoing      Problem: Gas Exchange - Impaired:  Goal: Levels of oxygenation will improve  Levels of oxygenation will improve   Outcome: Ongoing      Problem: Pain:  Goal: Pain level will decrease  Pain level will decrease    Outcome: Ongoing    Goal: Control of acute pain  Control of acute pain   Outcome: Ongoing      Problem: Falls - Risk of  Goal: Absence of falls  Outcome: Ongoing      Problem: Risk for Impaired Skin Integrity  Goal: Tissue integrity - skin and mucous membranes  Structural intactness and normal physiological function of skin and  mucous membranes.    Outcome: Met This Shift      Problem: Pain:  Goal: Pain level will decrease  Pain level will decrease    Outcome: Ongoing      Problem: Nutrition  Goal: Optimal nutrition therapy  Outcome: Ongoing      Problem: ABCDS Injury Assessment  Goal: Absence of physical injury  Outcome: Ongoing      Problem: Neurological  Goal: Maximum potential motor/sensory/cognitive function  Outcome: Ongoing      Problem: OXYGENATION/RESPIRATORY FUNCTION  Goal: Patient will maintain patent airway  Outcome: Ongoing    Goal: Patient will achieve/maintain normal respiratory rate/effort  Respiratory rate and effort will be within normal limits for the patient   Outcome: Ongoing      Problem: MECHANICAL VENTILATION  Goal: Patient will maintain patent airway  Outcome: Ongoing    Goal: Mobility/activity is maintained at optimum level for patient  Outcome: Ongoing      Problem: SKIN INTEGRITY  Goal: Skin integrity is maintained or improved  Outcome: Ongoing

## 2018-02-24 ENCOUNTER — HOSPITAL ENCOUNTER (OUTPATIENT)
Age: 43
Setting detail: SPECIMEN
Discharge: HOME | End: 2018-02-24
Payer: MEDICARE

## 2018-02-24 LAB
ABSOLUTE EOS #: 0.4 K/UL (ref 0–0.4)
ABSOLUTE IMMATURE GRANULOCYTE: ABNORMAL K/UL (ref 0–0.3)
ABSOLUTE LYMPH #: 1.7 K/UL (ref 1–4.8)
ABSOLUTE MONO #: 1.1 K/UL (ref 0.2–0.8)
ALBUMIN SERPL-MCNC: 2.9 G/DL (ref 3.5–5.2)
ALBUMIN/GLOBULIN RATIO: ABNORMAL (ref 1–2.5)
ALP BLD-CCNC: 96 U/L (ref 35–104)
ALT SERPL-CCNC: 69 U/L (ref 5–33)
ANION GAP SERPL CALCULATED.3IONS-SCNC: 10 MMOL/L (ref 9–17)
AST SERPL-CCNC: 37 U/L
BASOPHILS # BLD: 0 % (ref 0–2)
BASOPHILS ABSOLUTE: 0 K/UL (ref 0–0.2)
BILIRUB SERPL-MCNC: 0.46 MG/DL (ref 0.3–1.2)
BUN BLDV-MCNC: 12 MG/DL (ref 6–20)
BUN/CREAT BLD: 26 (ref 9–20)
CALCIUM SERPL-MCNC: 8.2 MG/DL (ref 8.6–10.4)
CHLORIDE BLD-SCNC: 99 MMOL/L (ref 98–107)
CO2: 31 MMOL/L (ref 20–31)
CREAT SERPL-MCNC: 0.47 MG/DL (ref 0.5–0.9)
DIFFERENTIAL TYPE: ABNORMAL
EOSINOPHILS RELATIVE PERCENT: 4 % (ref 1–4)
GFR AFRICAN AMERICAN: >60 ML/MIN
GFR NON-AFRICAN AMERICAN: >60 ML/MIN
GFR SERPL CREATININE-BSD FRML MDRD: ABNORMAL ML/MIN/{1.73_M2}
GFR SERPL CREATININE-BSD FRML MDRD: ABNORMAL ML/MIN/{1.73_M2}
GLUCOSE BLD-MCNC: 100 MG/DL (ref 70–99)
HCT VFR BLD CALC: 25.9 % (ref 36–46)
HEMOGLOBIN: 8.2 G/DL (ref 12–16)
IMMATURE GRANULOCYTES: ABNORMAL %
LYMPHOCYTES # BLD: 16 % (ref 24–44)
MAGNESIUM: 1.9 MG/DL (ref 1.6–2.6)
MCH RBC QN AUTO: 26.5 PG (ref 26–34)
MCHC RBC AUTO-ENTMCNC: 30.6 G/DL (ref 31–37)
MCV RBC AUTO: 86.7 FL (ref 80–100)
MONOCYTES # BLD: 11 % (ref 1–7)
MRSA, DNA, NASAL: NORMAL
NRBC AUTOMATED: ABNORMAL PER 100 WBC
PDW BLD-RTO: 18.4 % (ref 11.5–14.5)
PHOSPHORUS: 3.8 MG/DL (ref 2.6–4.5)
PLATELET # BLD: 263 K/UL (ref 130–400)
PLATELET ESTIMATE: ABNORMAL
PMV BLD AUTO: 8.9 FL (ref 6–12)
POTASSIUM SERPL-SCNC: 3.8 MMOL/L (ref 3.7–5.3)
PREALBUMIN: 16.2 MG/DL (ref 20–40)
RBC # BLD: 2.98 M/UL (ref 4–5.2)
RBC # BLD: ABNORMAL 10*6/UL
SEG NEUTROPHILS: 69 % (ref 36–66)
SEGMENTED NEUTROPHILS ABSOLUTE COUNT: 7 K/UL (ref 1.8–7.7)
SODIUM BLD-SCNC: 140 MMOL/L (ref 135–144)
SPECIMEN DESCRIPTION: NORMAL
TOTAL PROTEIN: 6 G/DL (ref 6.4–8.3)
WBC # BLD: 10.2 K/UL (ref 3.5–11)
WBC # BLD: ABNORMAL 10*3/UL

## 2018-02-24 PROCEDURE — 83735 ASSAY OF MAGNESIUM: CPT

## 2018-02-24 PROCEDURE — 36415 COLL VENOUS BLD VENIPUNCTURE: CPT

## 2018-02-24 PROCEDURE — 85025 COMPLETE CBC W/AUTO DIFF WBC: CPT

## 2018-02-24 PROCEDURE — 84134 ASSAY OF PREALBUMIN: CPT

## 2018-02-24 PROCEDURE — 84100 ASSAY OF PHOSPHORUS: CPT

## 2018-02-24 PROCEDURE — 80053 COMPREHEN METABOLIC PANEL: CPT

## 2018-02-25 LAB
CULTURE: ABNORMAL
CULTURE: NORMAL
DIRECT EXAM: ABNORMAL
Lab: ABNORMAL
Lab: NORMAL
ORGANISM: ABNORMAL
SPECIMEN DESCRIPTION: ABNORMAL
SPECIMEN DESCRIPTION: NORMAL
STATUS: ABNORMAL
STATUS: NORMAL
STATUS: NORMAL

## 2018-02-26 ENCOUNTER — HOSPITAL ENCOUNTER (OUTPATIENT)
Age: 43
Setting detail: SPECIMEN
Discharge: HOME | End: 2018-02-26
Payer: MEDICARE

## 2018-02-26 LAB
CULTURE: ABNORMAL
CULTURE: NORMAL
DIRECT EXAM: ABNORMAL
HCT VFR BLD CALC: 26.7 % (ref 36–46)
HEMOGLOBIN: 8.2 G/DL (ref 12–16)
Lab: ABNORMAL
Lab: ABNORMAL
Lab: NORMAL
MCH RBC QN AUTO: 27.3 PG (ref 26–34)
MCHC RBC AUTO-ENTMCNC: 30.6 G/DL (ref 31–37)
MCV RBC AUTO: 89.1 FL (ref 80–100)
NRBC AUTOMATED: ABNORMAL PER 100 WBC
ORGANISM: ABNORMAL
PDW BLD-RTO: 18.9 % (ref 11.5–14.5)
PLATELET # BLD: 285 K/UL (ref 130–400)
PMV BLD AUTO: 8.8 FL (ref 6–12)
RBC # BLD: 3 M/UL (ref 4–5.2)
SPECIMEN DESCRIPTION: ABNORMAL
SPECIMEN DESCRIPTION: ABNORMAL
SPECIMEN DESCRIPTION: NORMAL
STATUS: ABNORMAL
STATUS: NORMAL
STATUS: NORMAL
WBC # BLD: 8.7 K/UL (ref 3.5–11)

## 2018-02-26 PROCEDURE — 36415 COLL VENOUS BLD VENIPUNCTURE: CPT

## 2018-02-26 PROCEDURE — 85027 COMPLETE CBC AUTOMATED: CPT

## 2018-02-27 ENCOUNTER — HOSPITAL ENCOUNTER (OUTPATIENT)
Age: 43
Setting detail: SPECIMEN
Discharge: HOME | End: 2018-02-27
Payer: MEDICARE

## 2018-02-27 LAB
ABSOLUTE EOS #: 0.3 K/UL (ref 0–0.4)
ABSOLUTE IMMATURE GRANULOCYTE: ABNORMAL K/UL (ref 0–0.3)
ABSOLUTE LYMPH #: 2 K/UL (ref 1–4.8)
ABSOLUTE MONO #: 1.2 K/UL (ref 0.2–0.8)
ANION GAP SERPL CALCULATED.3IONS-SCNC: 12 MMOL/L (ref 9–17)
BASOPHILS # BLD: 0 % (ref 0–2)
BASOPHILS ABSOLUTE: 0 K/UL (ref 0–0.2)
BUN BLDV-MCNC: 14 MG/DL (ref 6–20)
BUN/CREAT BLD: 29 (ref 9–20)
CALCIUM SERPL-MCNC: 8.6 MG/DL (ref 8.6–10.4)
CHLORIDE BLD-SCNC: 100 MMOL/L (ref 98–107)
CO2: 27 MMOL/L (ref 20–31)
CREAT SERPL-MCNC: 0.48 MG/DL (ref 0.5–0.9)
DIFFERENTIAL TYPE: ABNORMAL
EOSINOPHILS RELATIVE PERCENT: 3 % (ref 1–4)
GFR AFRICAN AMERICAN: >60 ML/MIN
GFR NON-AFRICAN AMERICAN: >60 ML/MIN
GFR SERPL CREATININE-BSD FRML MDRD: ABNORMAL ML/MIN/{1.73_M2}
GFR SERPL CREATININE-BSD FRML MDRD: ABNORMAL ML/MIN/{1.73_M2}
GLUCOSE BLD-MCNC: 134 MG/DL (ref 70–99)
HCT VFR BLD CALC: 27.9 % (ref 36–46)
HEMOGLOBIN: 8.6 G/DL (ref 12–16)
IMMATURE GRANULOCYTES: ABNORMAL %
LYMPHOCYTES # BLD: 20 % (ref 24–44)
MCH RBC QN AUTO: 26.6 PG (ref 26–34)
MCHC RBC AUTO-ENTMCNC: 31 G/DL (ref 31–37)
MCV RBC AUTO: 85.7 FL (ref 80–100)
MONOCYTES # BLD: 12 % (ref 1–7)
NRBC AUTOMATED: ABNORMAL PER 100 WBC
PDW BLD-RTO: 18 % (ref 11.5–14.5)
PLATELET # BLD: 313 K/UL (ref 130–400)
PLATELET ESTIMATE: ABNORMAL
PMV BLD AUTO: 8.5 FL (ref 6–12)
POTASSIUM SERPL-SCNC: 4.3 MMOL/L (ref 3.7–5.3)
RBC # BLD: 3.26 M/UL (ref 4–5.2)
RBC # BLD: ABNORMAL 10*6/UL
SEG NEUTROPHILS: 65 % (ref 36–66)
SEGMENTED NEUTROPHILS ABSOLUTE COUNT: 6.4 K/UL (ref 1.8–7.7)
SODIUM BLD-SCNC: 139 MMOL/L (ref 135–144)
WBC # BLD: 10 K/UL (ref 3.5–11)
WBC # BLD: ABNORMAL 10*3/UL

## 2018-02-27 PROCEDURE — 85025 COMPLETE CBC W/AUTO DIFF WBC: CPT

## 2018-02-27 PROCEDURE — 80048 BASIC METABOLIC PNL TOTAL CA: CPT

## 2018-02-28 ENCOUNTER — HOSPITAL ENCOUNTER (OUTPATIENT)
Age: 43
Setting detail: SPECIMEN
Discharge: HOME | End: 2018-02-28
Payer: MEDICARE

## 2018-02-28 LAB
HCT VFR BLD CALC: 28.4 % (ref 36–46)
HEMOGLOBIN: 8.9 G/DL (ref 12–16)
MCH RBC QN AUTO: 26.8 PG (ref 26–34)
MCHC RBC AUTO-ENTMCNC: 31.3 G/DL (ref 31–37)
MCV RBC AUTO: 85.7 FL (ref 80–100)
NRBC AUTOMATED: ABNORMAL PER 100 WBC
PDW BLD-RTO: 17.4 % (ref 11.5–14.5)
PLATELET # BLD: 331 K/UL (ref 130–400)
PMV BLD AUTO: 8.4 FL (ref 6–12)
RBC # BLD: 3.31 M/UL (ref 4–5.2)
WBC # BLD: 9.3 K/UL (ref 3.5–11)

## 2018-02-28 PROCEDURE — 36415 COLL VENOUS BLD VENIPUNCTURE: CPT

## 2018-02-28 PROCEDURE — 85027 COMPLETE CBC AUTOMATED: CPT

## 2018-03-01 LAB
CULTURE: NORMAL
Lab: NORMAL
Lab: NORMAL
SPECIMEN DESCRIPTION: NORMAL
SPECIMEN DESCRIPTION: NORMAL
STATUS: NORMAL
STATUS: NORMAL

## 2018-03-02 ENCOUNTER — HOSPITAL ENCOUNTER (OUTPATIENT)
Age: 43
Setting detail: SPECIMEN
Discharge: HOME | End: 2018-03-02
Payer: MEDICARE

## 2018-03-02 LAB
HCT VFR BLD CALC: 28.8 % (ref 36–46)
HEMOGLOBIN: 9 G/DL (ref 12–16)
MCH RBC QN AUTO: 26.9 PG (ref 26–34)
MCHC RBC AUTO-ENTMCNC: 31.1 G/DL (ref 31–37)
MCV RBC AUTO: 86.4 FL (ref 80–100)
NRBC AUTOMATED: ABNORMAL PER 100 WBC
PDW BLD-RTO: 17.7 % (ref 11.5–14.5)
PLATELET # BLD: 373 K/UL (ref 130–400)
PMV BLD AUTO: 8.4 FL (ref 6–12)
RBC # BLD: 3.34 M/UL (ref 4–5.2)
WBC # BLD: 11 K/UL (ref 3.5–11)

## 2018-03-02 PROCEDURE — 85027 COMPLETE CBC AUTOMATED: CPT

## 2018-03-02 PROCEDURE — 36415 COLL VENOUS BLD VENIPUNCTURE: CPT

## 2018-03-04 ENCOUNTER — HOSPITAL ENCOUNTER (OUTPATIENT)
Age: 43
Setting detail: SPECIMEN
Discharge: HOME | End: 2018-03-04
Payer: MEDICARE

## 2018-03-04 LAB
HCT VFR BLD CALC: 29.8 % (ref 36–46)
HEMOGLOBIN: 9.3 G/DL (ref 12–16)
MCH RBC QN AUTO: 26.8 PG (ref 26–34)
MCHC RBC AUTO-ENTMCNC: 31.2 G/DL (ref 31–37)
MCV RBC AUTO: 85.8 FL (ref 80–100)
NRBC AUTOMATED: ABNORMAL PER 100 WBC
PDW BLD-RTO: 17.8 % (ref 11.5–14.5)
PLATELET # BLD: 368 K/UL (ref 130–400)
PMV BLD AUTO: 8.9 FL (ref 6–12)
RBC # BLD: 3.47 M/UL (ref 4–5.2)
WBC # BLD: 13.8 K/UL (ref 3.5–11)

## 2018-03-04 PROCEDURE — 85027 COMPLETE CBC AUTOMATED: CPT

## 2018-03-04 PROCEDURE — 36415 COLL VENOUS BLD VENIPUNCTURE: CPT

## 2018-03-05 ENCOUNTER — HOSPITAL ENCOUNTER (OUTPATIENT)
Age: 43
Setting detail: SPECIMEN
Discharge: HOME | End: 2018-03-05
Payer: MEDICARE

## 2018-03-05 LAB
% CKMB: 4 % (ref 0–3)
ANION GAP SERPL CALCULATED.3IONS-SCNC: 14 MMOL/L (ref 9–17)
BUN BLDV-MCNC: 18 MG/DL (ref 6–20)
BUN/CREAT BLD: 45 (ref 9–20)
CALCIUM SERPL-MCNC: 9.2 MG/DL (ref 8.6–10.4)
CHLORIDE BLD-SCNC: 97 MMOL/L (ref 98–107)
CK MB: <1 NG/ML
CKMB INTERPRETATION: ABNORMAL
CO2: 27 MMOL/L (ref 20–31)
CREAT SERPL-MCNC: 0.4 MG/DL (ref 0.5–0.9)
GFR AFRICAN AMERICAN: >60 ML/MIN
GFR NON-AFRICAN AMERICAN: >60 ML/MIN
GFR SERPL CREATININE-BSD FRML MDRD: ABNORMAL ML/MIN/{1.73_M2}
GFR SERPL CREATININE-BSD FRML MDRD: ABNORMAL ML/MIN/{1.73_M2}
GLUCOSE BLD-MCNC: 124 MG/DL (ref 70–99)
HCT VFR BLD CALC: 29.3 % (ref 36–46)
HEMOGLOBIN: 9.3 G/DL (ref 12–16)
MCH RBC QN AUTO: 27.5 PG (ref 26–34)
MCHC RBC AUTO-ENTMCNC: 31.9 G/DL (ref 31–37)
MCV RBC AUTO: 86.1 FL (ref 80–100)
MYOGLOBIN: <21 NG/ML (ref 25–58)
NRBC AUTOMATED: ABNORMAL PER 100 WBC
PDW BLD-RTO: 18.2 % (ref 11.5–14.5)
PLATELET # BLD: 398 K/UL (ref 130–400)
PMV BLD AUTO: 8.1 FL (ref 6–12)
POTASSIUM SERPL-SCNC: 4.3 MMOL/L (ref 3.7–5.3)
RBC # BLD: 3.4 M/UL (ref 4–5.2)
SODIUM BLD-SCNC: 138 MMOL/L (ref 135–144)
TOTAL CK: 25 U/L (ref 26–192)
TROPONIN INTERP: NORMAL
TROPONIN INTERP: NORMAL
TROPONIN T: <0.03 NG/ML
TROPONIN T: <0.03 NG/ML
WBC # BLD: 15.2 K/UL (ref 3.5–11)

## 2018-03-05 PROCEDURE — 36415 COLL VENOUS BLD VENIPUNCTURE: CPT

## 2018-03-05 PROCEDURE — 83874 ASSAY OF MYOGLOBIN: CPT

## 2018-03-05 PROCEDURE — 80048 BASIC METABOLIC PNL TOTAL CA: CPT

## 2018-03-05 PROCEDURE — 82550 ASSAY OF CK (CPK): CPT

## 2018-03-05 PROCEDURE — 84484 ASSAY OF TROPONIN QUANT: CPT

## 2018-03-05 PROCEDURE — 82553 CREATINE MB FRACTION: CPT

## 2018-03-05 PROCEDURE — 85027 COMPLETE CBC AUTOMATED: CPT

## 2018-03-06 ENCOUNTER — HOSPITAL ENCOUNTER (OUTPATIENT)
Age: 43
Setting detail: SPECIMEN
Discharge: HOME | End: 2018-03-06
Payer: MEDICARE

## 2018-03-06 LAB
ABSOLUTE EOS #: 0.2 K/UL (ref 0–0.4)
ABSOLUTE IMMATURE GRANULOCYTE: ABNORMAL K/UL (ref 0–0.3)
ABSOLUTE LYMPH #: 2.1 K/UL (ref 1–4.8)
ABSOLUTE MONO #: 0.8 K/UL (ref 0.2–0.8)
BASOPHILS # BLD: 0 % (ref 0–2)
BASOPHILS ABSOLUTE: 0.1 K/UL (ref 0–0.2)
DIFFERENTIAL TYPE: ABNORMAL
EOSINOPHILS RELATIVE PERCENT: 2 % (ref 1–4)
HCT VFR BLD CALC: 31.9 % (ref 36–46)
HEMOGLOBIN: 10.1 G/DL (ref 12–16)
IMMATURE GRANULOCYTES: ABNORMAL %
LYMPHOCYTES # BLD: 15 % (ref 24–44)
MCH RBC QN AUTO: 27.7 PG (ref 26–34)
MCHC RBC AUTO-ENTMCNC: 31.6 G/DL (ref 31–37)
MCV RBC AUTO: 87.6 FL (ref 80–100)
MONOCYTES # BLD: 6 % (ref 1–7)
NRBC AUTOMATED: ABNORMAL PER 100 WBC
PDW BLD-RTO: 17.9 % (ref 11.5–14.5)
PLATELET # BLD: 362 K/UL (ref 130–400)
PLATELET ESTIMATE: ABNORMAL
PMV BLD AUTO: 8.4 FL (ref 6–12)
RBC # BLD: 3.64 M/UL (ref 4–5.2)
RBC # BLD: ABNORMAL 10*6/UL
SEG NEUTROPHILS: 77 % (ref 36–66)
SEGMENTED NEUTROPHILS ABSOLUTE COUNT: 10.7 K/UL (ref 1.8–7.7)
WBC # BLD: 13.8 K/UL (ref 3.5–11)
WBC # BLD: ABNORMAL 10*3/UL

## 2018-03-06 PROCEDURE — 36415 COLL VENOUS BLD VENIPUNCTURE: CPT

## 2018-03-06 PROCEDURE — 85025 COMPLETE CBC W/AUTO DIFF WBC: CPT

## 2018-03-08 ENCOUNTER — HOSPITAL ENCOUNTER (OUTPATIENT)
Age: 43
Setting detail: SPECIMEN
Discharge: HOME | End: 2018-03-08
Payer: MEDICARE

## 2018-03-08 LAB
HCT VFR BLD CALC: 32.7 % (ref 36–46)
HEMOGLOBIN: 10.4 G/DL (ref 12–16)
MCH RBC QN AUTO: 29.7 PG (ref 26–34)
MCHC RBC AUTO-ENTMCNC: 31.8 G/DL (ref 31–37)
MCV RBC AUTO: 93.6 FL (ref 80–100)
NRBC AUTOMATED: ABNORMAL PER 100 WBC
PDW BLD-RTO: 18.7 % (ref 11.5–14.5)
PLATELET # BLD: 111 K/UL (ref 130–400)
PMV BLD AUTO: ABNORMAL FL (ref 6–12)
RBC # BLD: 3.5 M/UL (ref 4–5.2)
WBC # BLD: 11.9 K/UL (ref 3.5–11)

## 2018-03-08 PROCEDURE — 85027 COMPLETE CBC AUTOMATED: CPT

## 2018-03-08 PROCEDURE — 36415 COLL VENOUS BLD VENIPUNCTURE: CPT

## 2018-03-09 ENCOUNTER — HOSPITAL ENCOUNTER (OUTPATIENT)
Age: 43
Setting detail: SPECIMEN
Discharge: HOME | End: 2018-03-09
Payer: MEDICARE

## 2018-03-09 LAB
ANION GAP SERPL CALCULATED.3IONS-SCNC: 15 MMOL/L (ref 9–17)
BUN BLDV-MCNC: 24 MG/DL (ref 6–20)
BUN/CREAT BLD: 51 (ref 9–20)
CALCIUM SERPL-MCNC: 9.3 MG/DL (ref 8.6–10.4)
CHLORIDE BLD-SCNC: 96 MMOL/L (ref 98–107)
CO2: 27 MMOL/L (ref 20–31)
CREAT SERPL-MCNC: 0.47 MG/DL (ref 0.5–0.9)
GFR AFRICAN AMERICAN: >60 ML/MIN
GFR NON-AFRICAN AMERICAN: >60 ML/MIN
GFR SERPL CREATININE-BSD FRML MDRD: ABNORMAL ML/MIN/{1.73_M2}
GFR SERPL CREATININE-BSD FRML MDRD: ABNORMAL ML/MIN/{1.73_M2}
GLUCOSE BLD-MCNC: 89 MG/DL (ref 70–99)
HCT VFR BLD CALC: 29.6 % (ref 36–46)
HEMOGLOBIN: 9.3 G/DL (ref 12–16)
MCH RBC QN AUTO: 27.7 PG (ref 26–34)
MCHC RBC AUTO-ENTMCNC: 31.5 G/DL (ref 31–37)
MCV RBC AUTO: 87.8 FL (ref 80–100)
NRBC AUTOMATED: ABNORMAL PER 100 WBC
PDW BLD-RTO: 17.9 % (ref 11.5–14.5)
PLATELET # BLD: 336 K/UL (ref 130–400)
PMV BLD AUTO: 8.4 FL (ref 6–12)
POTASSIUM SERPL-SCNC: 5.3 MMOL/L (ref 3.7–5.3)
RBC # BLD: 3.37 M/UL (ref 4–5.2)
SODIUM BLD-SCNC: 138 MMOL/L (ref 135–144)
WBC # BLD: 12.1 K/UL (ref 3.5–11)

## 2018-03-09 PROCEDURE — 85027 COMPLETE CBC AUTOMATED: CPT

## 2018-03-09 PROCEDURE — 36415 COLL VENOUS BLD VENIPUNCTURE: CPT

## 2018-03-09 PROCEDURE — 80048 BASIC METABOLIC PNL TOTAL CA: CPT

## 2018-03-10 ENCOUNTER — HOSPITAL ENCOUNTER (OUTPATIENT)
Age: 43
Setting detail: SPECIMEN
Discharge: HOME | End: 2018-03-10
Payer: MEDICARE

## 2018-03-10 LAB
HCT VFR BLD CALC: 29.5 % (ref 36–46)
HEMOGLOBIN: 9.1 G/DL (ref 12–16)
MCH RBC QN AUTO: 27.3 PG (ref 26–34)
MCHC RBC AUTO-ENTMCNC: 31 G/DL (ref 31–37)
MCV RBC AUTO: 87.9 FL (ref 80–100)
NRBC AUTOMATED: ABNORMAL PER 100 WBC
PDW BLD-RTO: 17.9 % (ref 11.5–14.5)
PLATELET # BLD: 239 K/UL (ref 130–400)
PMV BLD AUTO: 8.6 FL (ref 6–12)
RBC # BLD: 3.35 M/UL (ref 4–5.2)
WBC # BLD: 15.1 K/UL (ref 3.5–11)

## 2018-03-10 PROCEDURE — 36415 COLL VENOUS BLD VENIPUNCTURE: CPT

## 2018-03-10 PROCEDURE — 85027 COMPLETE CBC AUTOMATED: CPT

## 2018-03-11 ENCOUNTER — HOSPITAL ENCOUNTER (OUTPATIENT)
Age: 43
Setting detail: SPECIMEN
Discharge: HOME | End: 2018-03-11
Payer: MEDICARE

## 2018-03-11 LAB
ANION GAP SERPL CALCULATED.3IONS-SCNC: 12 MMOL/L (ref 9–17)
BUN BLDV-MCNC: 30 MG/DL (ref 6–20)
BUN/CREAT BLD: 50 (ref 9–20)
CALCIUM SERPL-MCNC: 9.1 MG/DL (ref 8.6–10.4)
CHLORIDE BLD-SCNC: 93 MMOL/L (ref 98–107)
CO2: 27 MMOL/L (ref 20–31)
CREAT SERPL-MCNC: 0.6 MG/DL (ref 0.5–0.9)
GFR AFRICAN AMERICAN: >60 ML/MIN
GFR NON-AFRICAN AMERICAN: >60 ML/MIN
GFR SERPL CREATININE-BSD FRML MDRD: ABNORMAL ML/MIN/{1.73_M2}
GFR SERPL CREATININE-BSD FRML MDRD: ABNORMAL ML/MIN/{1.73_M2}
GLUCOSE BLD-MCNC: 99 MG/DL (ref 70–99)
HCT VFR BLD CALC: 28.6 % (ref 36–46)
HEMOGLOBIN: 8.9 G/DL (ref 12–16)
MCH RBC QN AUTO: 27.7 PG (ref 26–34)
MCHC RBC AUTO-ENTMCNC: 31.2 G/DL (ref 31–37)
MCV RBC AUTO: 88.8 FL (ref 80–100)
NRBC AUTOMATED: ABNORMAL PER 100 WBC
PDW BLD-RTO: 18.5 % (ref 11.5–14.5)
PLATELET # BLD: 276 K/UL (ref 130–400)
PMV BLD AUTO: 8.8 FL (ref 6–12)
POTASSIUM SERPL-SCNC: 5.4 MMOL/L (ref 3.7–5.3)
RBC # BLD: 3.23 M/UL (ref 4–5.2)
SODIUM BLD-SCNC: 132 MMOL/L (ref 135–144)
WBC # BLD: 12.9 K/UL (ref 3.5–11)

## 2018-03-11 PROCEDURE — 80048 BASIC METABOLIC PNL TOTAL CA: CPT

## 2018-03-11 PROCEDURE — 36415 COLL VENOUS BLD VENIPUNCTURE: CPT

## 2018-03-11 PROCEDURE — 85027 COMPLETE CBC AUTOMATED: CPT

## 2018-03-12 ENCOUNTER — HOSPITAL ENCOUNTER (OUTPATIENT)
Age: 43
Setting detail: SPECIMEN
Discharge: HOME | End: 2018-03-12
Payer: MEDICARE

## 2018-03-12 LAB
HCT VFR BLD CALC: 30.2 % (ref 36–46)
HEMOGLOBIN: 9.4 G/DL (ref 12–16)
MCH RBC QN AUTO: 26.9 PG (ref 26–34)
MCHC RBC AUTO-ENTMCNC: 31.1 G/DL (ref 31–37)
MCV RBC AUTO: 86.4 FL (ref 80–100)
NRBC AUTOMATED: ABNORMAL PER 100 WBC
PDW BLD-RTO: 17.5 % (ref 11.5–14.5)
PLATELET # BLD: 301 K/UL (ref 130–400)
PMV BLD AUTO: ABNORMAL FL (ref 6–12)
RBC # BLD: 3.49 M/UL (ref 4–5.2)
WBC # BLD: 13.7 K/UL (ref 3.5–11)

## 2018-03-12 PROCEDURE — 85027 COMPLETE CBC AUTOMATED: CPT

## 2018-03-12 PROCEDURE — 36415 COLL VENOUS BLD VENIPUNCTURE: CPT

## 2018-03-14 ENCOUNTER — HOSPITAL ENCOUNTER (OUTPATIENT)
Age: 43
Setting detail: SPECIMEN
Discharge: HOME | End: 2018-03-14
Payer: MEDICARE

## 2018-03-14 LAB
ANION GAP SERPL CALCULATED.3IONS-SCNC: 14 MMOL/L (ref 9–17)
BUN BLDV-MCNC: 36 MG/DL (ref 6–20)
BUN/CREAT BLD: 57 (ref 9–20)
CALCIUM SERPL-MCNC: 9.2 MG/DL (ref 8.6–10.4)
CHLORIDE BLD-SCNC: 93 MMOL/L (ref 98–107)
CO2: 28 MMOL/L (ref 20–31)
CREAT SERPL-MCNC: 0.63 MG/DL (ref 0.5–0.9)
GFR AFRICAN AMERICAN: >60 ML/MIN
GFR NON-AFRICAN AMERICAN: >60 ML/MIN
GFR SERPL CREATININE-BSD FRML MDRD: ABNORMAL ML/MIN/{1.73_M2}
GFR SERPL CREATININE-BSD FRML MDRD: ABNORMAL ML/MIN/{1.73_M2}
GLUCOSE BLD-MCNC: 117 MG/DL (ref 70–99)
HCT VFR BLD CALC: 30 % (ref 36–46)
HEMOGLOBIN: 9.5 G/DL (ref 12–16)
MCH RBC QN AUTO: 27.9 PG (ref 26–34)
MCHC RBC AUTO-ENTMCNC: 31.8 G/DL (ref 31–37)
MCV RBC AUTO: 87.7 FL (ref 80–100)
NRBC AUTOMATED: ABNORMAL PER 100 WBC
PDW BLD-RTO: 18 % (ref 11.5–14.5)
PLATELET # BLD: 277 K/UL (ref 130–400)
PMV BLD AUTO: 8.5 FL (ref 6–12)
POTASSIUM SERPL-SCNC: 5.8 MMOL/L (ref 3.7–5.3)
RBC # BLD: 3.42 M/UL (ref 4–5.2)
SODIUM BLD-SCNC: 135 MMOL/L (ref 135–144)
WBC # BLD: 14.3 K/UL (ref 3.5–11)

## 2018-03-14 PROCEDURE — 80048 BASIC METABOLIC PNL TOTAL CA: CPT

## 2018-03-14 PROCEDURE — 85027 COMPLETE CBC AUTOMATED: CPT

## 2018-03-14 PROCEDURE — 36415 COLL VENOUS BLD VENIPUNCTURE: CPT

## 2018-03-15 ENCOUNTER — HOSPITAL ENCOUNTER (OUTPATIENT)
Age: 43
Setting detail: SPECIMEN
Discharge: HOME | End: 2018-03-15
Payer: MEDICARE

## 2018-03-15 LAB
ANION GAP SERPL CALCULATED.3IONS-SCNC: 9 MMOL/L (ref 9–17)
BUN BLDV-MCNC: 37 MG/DL (ref 6–20)
BUN/CREAT BLD: 51 (ref 9–20)
CALCIUM SERPL-MCNC: 9.2 MG/DL (ref 8.6–10.4)
CHLORIDE BLD-SCNC: 97 MMOL/L (ref 98–107)
CO2: 30 MMOL/L (ref 20–31)
CREAT SERPL-MCNC: 0.72 MG/DL (ref 0.5–0.9)
GFR AFRICAN AMERICAN: >60 ML/MIN
GFR NON-AFRICAN AMERICAN: >60 ML/MIN
GFR SERPL CREATININE-BSD FRML MDRD: ABNORMAL ML/MIN/{1.73_M2}
GFR SERPL CREATININE-BSD FRML MDRD: ABNORMAL ML/MIN/{1.73_M2}
GLUCOSE BLD-MCNC: 111 MG/DL (ref 70–99)
HCT VFR BLD CALC: 27.1 % (ref 36–46)
HEMOGLOBIN: 8.6 G/DL (ref 12–16)
MCH RBC QN AUTO: 27.6 PG (ref 26–34)
MCHC RBC AUTO-ENTMCNC: 31.6 G/DL (ref 31–37)
MCV RBC AUTO: 87.3 FL (ref 80–100)
NRBC AUTOMATED: ABNORMAL PER 100 WBC
PDW BLD-RTO: 17.2 % (ref 11.5–14.5)
PLATELET # BLD: 246 K/UL (ref 130–400)
PMV BLD AUTO: 8.7 FL (ref 6–12)
POTASSIUM SERPL-SCNC: 4.9 MMOL/L (ref 3.7–5.3)
RBC # BLD: 3.1 M/UL (ref 4–5.2)
SODIUM BLD-SCNC: 136 MMOL/L (ref 135–144)
WBC # BLD: 12.6 K/UL (ref 3.5–11)

## 2018-03-15 PROCEDURE — 85027 COMPLETE CBC AUTOMATED: CPT

## 2018-03-15 PROCEDURE — 80048 BASIC METABOLIC PNL TOTAL CA: CPT

## 2018-03-15 PROCEDURE — 36415 COLL VENOUS BLD VENIPUNCTURE: CPT

## 2018-03-16 ENCOUNTER — HOSPITAL ENCOUNTER (OUTPATIENT)
Age: 43
Setting detail: SPECIMEN
Discharge: HOME | End: 2018-03-16
Payer: MEDICARE

## 2018-03-16 LAB
HCT VFR BLD CALC: 27.2 % (ref 36–46)
HEMOGLOBIN: 7.9 G/DL (ref 12–16)
MCH RBC QN AUTO: 25 PG (ref 26–34)
MCHC RBC AUTO-ENTMCNC: 29.1 G/DL (ref 31–37)
MCV RBC AUTO: 85.9 FL (ref 80–100)
NRBC AUTOMATED: ABNORMAL PER 100 WBC
PDW BLD-RTO: 16.1 % (ref 11.5–14.5)
PLATELET # BLD: 258 K/UL (ref 130–400)
PMV BLD AUTO: ABNORMAL FL (ref 6–12)
RBC # BLD: 3.17 M/UL (ref 4–5.2)
WBC # BLD: 16.4 K/UL (ref 3.5–11)

## 2018-03-16 PROCEDURE — P9603 ONE-WAY ALLOW PRORATED MILES: HCPCS

## 2018-03-16 PROCEDURE — 85027 COMPLETE CBC AUTOMATED: CPT

## 2018-03-16 PROCEDURE — 36415 COLL VENOUS BLD VENIPUNCTURE: CPT

## 2018-03-18 ENCOUNTER — HOSPITAL ENCOUNTER (OUTPATIENT)
Age: 43
Setting detail: SPECIMEN
Discharge: HOME | End: 2018-03-18
Payer: MEDICARE

## 2018-03-18 LAB
ANION GAP SERPL CALCULATED.3IONS-SCNC: 13 MMOL/L (ref 9–17)
BUN BLDV-MCNC: 23 MG/DL (ref 6–20)
BUN/CREAT BLD: 46 (ref 9–20)
CALCIUM SERPL-MCNC: 9.6 MG/DL (ref 8.6–10.4)
CHLORIDE BLD-SCNC: 100 MMOL/L (ref 98–107)
CO2: 30 MMOL/L (ref 20–31)
CREAT SERPL-MCNC: 0.5 MG/DL (ref 0.5–0.9)
GFR AFRICAN AMERICAN: >60 ML/MIN
GFR NON-AFRICAN AMERICAN: >60 ML/MIN
GFR SERPL CREATININE-BSD FRML MDRD: ABNORMAL ML/MIN/{1.73_M2}
GFR SERPL CREATININE-BSD FRML MDRD: ABNORMAL ML/MIN/{1.73_M2}
GLUCOSE BLD-MCNC: 79 MG/DL (ref 70–99)
HCT VFR BLD CALC: 28.1 % (ref 36–46)
HEMOGLOBIN: 9 G/DL (ref 12–16)
MCH RBC QN AUTO: 27.8 PG (ref 26–34)
MCHC RBC AUTO-ENTMCNC: 32 G/DL (ref 31–37)
MCV RBC AUTO: 86.8 FL (ref 80–100)
NRBC AUTOMATED: ABNORMAL PER 100 WBC
PDW BLD-RTO: 16.6 % (ref 11.5–14.5)
PLATELET # BLD: 236 K/UL (ref 130–400)
PMV BLD AUTO: 8.4 FL (ref 6–12)
POTASSIUM SERPL-SCNC: 5 MMOL/L (ref 3.7–5.3)
RBC # BLD: 3.24 M/UL (ref 4–5.2)
SODIUM BLD-SCNC: 143 MMOL/L (ref 135–144)
WBC # BLD: 10.9 K/UL (ref 3.5–11)

## 2018-03-18 PROCEDURE — 36415 COLL VENOUS BLD VENIPUNCTURE: CPT

## 2018-03-18 PROCEDURE — 85027 COMPLETE CBC AUTOMATED: CPT

## 2018-03-18 PROCEDURE — 80048 BASIC METABOLIC PNL TOTAL CA: CPT

## 2018-03-23 NOTE — PROGRESS NOTES
02/12/18 0505   Cough/Sputum   Sputum Amount Moderate   Sputum Color Clear; White   Tenacity Thin   Sputum How Obtained Endotracheal;Suctioned       Bag suctioned patient, tolerated well. Pre-oxygenated pt 100% fio2 and slowly weaned down. 65

## 2018-04-11 PROBLEM — R77.8 ELEVATED TROPONIN: Status: RESOLVED | Noted: 2018-01-27 | Resolved: 2018-04-11

## 2018-04-11 PROBLEM — R79.89 ELEVATED TROPONIN: Status: RESOLVED | Noted: 2018-01-27 | Resolved: 2018-04-11

## 2018-06-08 ENCOUNTER — HOSPITAL ENCOUNTER (INPATIENT)
Age: 43
LOS: 4 days | Discharge: NURSING, REHAB OR LONG-TERM CARE | DRG: 192 | End: 2018-06-13
Attending: EMERGENCY MEDICINE | Admitting: FAMILY MEDICINE
Payer: MEDICARE

## 2018-06-08 ENCOUNTER — APPOINTMENT (OUTPATIENT)
Dept: GENERAL RADIOLOGY | Age: 43
DRG: 192 | End: 2018-06-08
Payer: MEDICARE

## 2018-06-08 DIAGNOSIS — R07.9 CHEST PAIN, UNSPECIFIED TYPE: Primary | ICD-10-CM

## 2018-06-08 DIAGNOSIS — G47.33 OSA (OBSTRUCTIVE SLEEP APNEA): ICD-10-CM

## 2018-06-08 LAB
ABSOLUTE EOS #: 0.08 K/UL (ref 0–0.44)
ABSOLUTE IMMATURE GRANULOCYTE: 0 K/UL (ref 0–0.3)
ABSOLUTE LYMPH #: 2.75 K/UL (ref 1.1–3.7)
ABSOLUTE MONO #: 0.81 K/UL (ref 0.1–1.2)
ANION GAP SERPL CALCULATED.3IONS-SCNC: 15 MMOL/L (ref 9–17)
BASOPHILS # BLD: 0 % (ref 0–2)
BASOPHILS ABSOLUTE: 0 K/UL (ref 0–0.2)
BNP INTERPRETATION: NORMAL
BUN BLDV-MCNC: 21 MG/DL (ref 6–20)
BUN/CREAT BLD: ABNORMAL (ref 9–20)
CALCIUM SERPL-MCNC: 9 MG/DL (ref 8.6–10.4)
CHLORIDE BLD-SCNC: 103 MMOL/L (ref 98–107)
CO2: 25 MMOL/L (ref 20–31)
CREAT SERPL-MCNC: 0.73 MG/DL (ref 0.5–0.9)
DIFFERENTIAL TYPE: ABNORMAL
DIRECT EXAM: NORMAL
EKG ATRIAL RATE: 88 BPM
EKG Q-T INTERVAL: 344 MS
EKG QRS DURATION: 80 MS
EKG QTC CALCULATION (BAZETT): 392 MS
EKG R AXIS: 14 DEGREES
EKG T AXIS: -13 DEGREES
EKG VENTRICULAR RATE: 78 BPM
EOSINOPHILS RELATIVE PERCENT: 1 % (ref 1–4)
GFR AFRICAN AMERICAN: >60 ML/MIN
GFR NON-AFRICAN AMERICAN: >60 ML/MIN
GFR SERPL CREATININE-BSD FRML MDRD: ABNORMAL ML/MIN/{1.73_M2}
GFR SERPL CREATININE-BSD FRML MDRD: ABNORMAL ML/MIN/{1.73_M2}
GLUCOSE BLD-MCNC: 97 MG/DL (ref 70–99)
HCT VFR BLD CALC: 33.3 % (ref 36.3–47.1)
HEMOGLOBIN: 9.3 G/DL (ref 11.9–15.1)
IMMATURE GRANULOCYTES: 0 %
LV EF: 65 %
LVEF MODALITY: NORMAL
LYMPHOCYTES # BLD: 34 % (ref 24–43)
Lab: NORMAL
MCH RBC QN AUTO: 26.5 PG (ref 25.2–33.5)
MCHC RBC AUTO-ENTMCNC: 27.9 G/DL (ref 28.4–34.8)
MCV RBC AUTO: 94.9 FL (ref 82.6–102.9)
MONOCYTES # BLD: 10 % (ref 3–12)
MORPHOLOGY: NORMAL
NRBC AUTOMATED: 0 PER 100 WBC
PDW BLD-RTO: 13.5 % (ref 11.8–14.4)
PLATELET # BLD: 269 K/UL (ref 138–453)
PLATELET ESTIMATE: ABNORMAL
PMV BLD AUTO: 10 FL (ref 8.1–13.5)
POC TROPONIN I: 0 NG/ML (ref 0–0.1)
POC TROPONIN I: 0.04 NG/ML (ref 0–0.1)
POC TROPONIN INTERP: NORMAL
POC TROPONIN INTERP: NORMAL
POTASSIUM SERPL-SCNC: 3.9 MMOL/L (ref 3.7–5.3)
PRO-BNP: 278 PG/ML
RBC # BLD: 3.51 M/UL (ref 3.95–5.11)
RBC # BLD: ABNORMAL 10*6/UL
SEG NEUTROPHILS: 55 % (ref 36–65)
SEGMENTED NEUTROPHILS ABSOLUTE COUNT: 4.46 K/UL (ref 1.5–8.1)
SODIUM BLD-SCNC: 143 MMOL/L (ref 135–144)
SPECIMEN DESCRIPTION: NORMAL
STATUS: NORMAL
TROPONIN INTERP: NORMAL
TROPONIN INTERP: NORMAL
TROPONIN T: <0.03 NG/ML
TROPONIN T: <0.03 NG/ML
WBC # BLD: 8.1 K/UL (ref 3.5–11.3)
WBC # BLD: ABNORMAL 10*3/UL

## 2018-06-08 PROCEDURE — 6370000000 HC RX 637 (ALT 250 FOR IP): Performed by: EMERGENCY MEDICINE

## 2018-06-08 PROCEDURE — 6370000000 HC RX 637 (ALT 250 FOR IP): Performed by: STUDENT IN AN ORGANIZED HEALTH CARE EDUCATION/TRAINING PROGRAM

## 2018-06-08 PROCEDURE — 6360000002 HC RX W HCPCS: Performed by: EMERGENCY MEDICINE

## 2018-06-08 PROCEDURE — 84484 ASSAY OF TROPONIN QUANT: CPT

## 2018-06-08 PROCEDURE — 71045 X-RAY EXAM CHEST 1 VIEW: CPT

## 2018-06-08 PROCEDURE — 99285 EMERGENCY DEPT VISIT HI MDM: CPT

## 2018-06-08 PROCEDURE — 83880 ASSAY OF NATRIURETIC PEPTIDE: CPT

## 2018-06-08 PROCEDURE — 94640 AIRWAY INHALATION TREATMENT: CPT

## 2018-06-08 PROCEDURE — 80048 BASIC METABOLIC PNL TOTAL CA: CPT

## 2018-06-08 PROCEDURE — 93306 TTE W/DOPPLER COMPLETE: CPT

## 2018-06-08 PROCEDURE — 94664 DEMO&/EVAL PT USE INHALER: CPT

## 2018-06-08 PROCEDURE — G0378 HOSPITAL OBSERVATION PER HR: HCPCS

## 2018-06-08 PROCEDURE — 36415 COLL VENOUS BLD VENIPUNCTURE: CPT

## 2018-06-08 PROCEDURE — 96374 THER/PROPH/DIAG INJ IV PUSH: CPT

## 2018-06-08 PROCEDURE — 2500000003 HC RX 250 WO HCPCS: Performed by: EMERGENCY MEDICINE

## 2018-06-08 PROCEDURE — 87804 INFLUENZA ASSAY W/OPTIC: CPT

## 2018-06-08 PROCEDURE — 85025 COMPLETE CBC W/AUTO DIFF WBC: CPT

## 2018-06-08 PROCEDURE — 93005 ELECTROCARDIOGRAM TRACING: CPT

## 2018-06-08 PROCEDURE — 2580000003 HC RX 258: Performed by: EMERGENCY MEDICINE

## 2018-06-08 RX ORDER — ONDANSETRON 2 MG/ML
4 INJECTION INTRAMUSCULAR; INTRAVENOUS EVERY 8 HOURS PRN
Status: DISCONTINUED | OUTPATIENT
Start: 2018-06-08 | End: 2018-06-14 | Stop reason: HOSPADM

## 2018-06-08 RX ORDER — GABAPENTIN 600 MG/1
600 TABLET ORAL 3 TIMES DAILY
Status: DISCONTINUED | OUTPATIENT
Start: 2018-06-08 | End: 2018-06-14 | Stop reason: HOSPADM

## 2018-06-08 RX ORDER — HYDROCODONE BITARTRATE AND ACETAMINOPHEN 5; 325 MG/1; MG/1
2 TABLET ORAL EVERY 4 HOURS PRN
Status: DISCONTINUED | OUTPATIENT
Start: 2018-06-08 | End: 2018-06-08

## 2018-06-08 RX ORDER — ONDANSETRON 4 MG/1
4 TABLET, ORALLY DISINTEGRATING ORAL EVERY 8 HOURS PRN
Status: DISCONTINUED | OUTPATIENT
Start: 2018-06-08 | End: 2018-06-14 | Stop reason: HOSPADM

## 2018-06-08 RX ORDER — ONDANSETRON 4 MG/1
4 TABLET, ORALLY DISINTEGRATING ORAL EVERY 8 HOURS PRN
COMMUNITY
End: 2019-10-28 | Stop reason: ALTCHOICE

## 2018-06-08 RX ORDER — IPRATROPIUM BROMIDE AND ALBUTEROL SULFATE 2.5; .5 MG/3ML; MG/3ML
3 SOLUTION RESPIRATORY (INHALATION) EVERY 6 HOURS PRN
COMMUNITY
End: 2019-05-31 | Stop reason: ALTCHOICE

## 2018-06-08 RX ORDER — HYDROCODONE BITARTRATE AND ACETAMINOPHEN 5; 325 MG/1; MG/1
1 TABLET ORAL EVERY 4 HOURS PRN
Status: DISCONTINUED | OUTPATIENT
Start: 2018-06-08 | End: 2018-06-08

## 2018-06-08 RX ORDER — AMLODIPINE BESYLATE 10 MG/1
10 TABLET ORAL DAILY
Status: DISCONTINUED | OUTPATIENT
Start: 2018-06-08 | End: 2018-06-14 | Stop reason: HOSPADM

## 2018-06-08 RX ORDER — ALBUTEROL SULFATE 2.5 MG/3ML
2.5 SOLUTION RESPIRATORY (INHALATION) 4 TIMES DAILY
Status: DISCONTINUED | OUTPATIENT
Start: 2018-06-08 | End: 2018-06-14 | Stop reason: HOSPADM

## 2018-06-08 RX ORDER — LANOLIN ALCOHOL/MO/W.PET/CERES
325 CREAM (GRAM) TOPICAL 2 TIMES DAILY WITH MEALS
Status: DISCONTINUED | OUTPATIENT
Start: 2018-06-08 | End: 2018-06-14 | Stop reason: HOSPADM

## 2018-06-08 RX ORDER — FAMOTIDINE 20 MG/1
20 TABLET, FILM COATED ORAL 2 TIMES DAILY
Status: DISCONTINUED | OUTPATIENT
Start: 2018-06-08 | End: 2018-06-14 | Stop reason: HOSPADM

## 2018-06-08 RX ORDER — LANOLIN ALCOHOL/MO/W.PET/CERES
325 CREAM (GRAM) TOPICAL 2 TIMES DAILY WITH MEALS
COMMUNITY
Start: 2018-04-20

## 2018-06-08 RX ORDER — ALBUTEROL SULFATE 2.5 MG/3ML
5 SOLUTION RESPIRATORY (INHALATION)
Status: DISCONTINUED | OUTPATIENT
Start: 2018-06-08 | End: 2018-06-08

## 2018-06-08 RX ORDER — ACETAMINOPHEN 325 MG/1
650 TABLET ORAL EVERY 4 HOURS PRN
Status: DISCONTINUED | OUTPATIENT
Start: 2018-06-08 | End: 2018-06-08

## 2018-06-08 RX ORDER — SODIUM CHLORIDE 0.9 % (FLUSH) 0.9 %
10 SYRINGE (ML) INJECTION EVERY 12 HOURS SCHEDULED
Status: DISCONTINUED | OUTPATIENT
Start: 2018-06-08 | End: 2018-06-14 | Stop reason: HOSPADM

## 2018-06-08 RX ORDER — ADHESIVE TAPE 3"X 2.3 YD
400 TAPE, NON-MEDICATED TOPICAL 2 TIMES DAILY
COMMUNITY
End: 2019-05-31 | Stop reason: ALTCHOICE

## 2018-06-08 RX ORDER — ALBUTEROL SULFATE 90 UG/1
2 AEROSOL, METERED RESPIRATORY (INHALATION)
Status: DISCONTINUED | OUTPATIENT
Start: 2018-06-08 | End: 2018-06-08

## 2018-06-08 RX ORDER — METHYLPREDNISOLONE SODIUM SUCCINATE 125 MG/2ML
125 INJECTION, POWDER, LYOPHILIZED, FOR SOLUTION INTRAMUSCULAR; INTRAVENOUS ONCE
Status: COMPLETED | OUTPATIENT
Start: 2018-06-08 | End: 2018-06-08

## 2018-06-08 RX ORDER — SODIUM CHLORIDE 0.9 % (FLUSH) 0.9 %
10 SYRINGE (ML) INJECTION PRN
Status: DISCONTINUED | OUTPATIENT
Start: 2018-06-08 | End: 2018-06-14 | Stop reason: HOSPADM

## 2018-06-08 RX ORDER — NAPROXEN 375 MG/1
375 TABLET ORAL
Status: ON HOLD | COMMUNITY
End: 2018-06-12 | Stop reason: HOSPADM

## 2018-06-08 RX ORDER — OXYCODONE HYDROCHLORIDE AND ACETAMINOPHEN 5; 325 MG/1; MG/1
1 TABLET ORAL EVERY 6 HOURS PRN
Status: DISCONTINUED | OUTPATIENT
Start: 2018-06-08 | End: 2018-06-14 | Stop reason: HOSPADM

## 2018-06-08 RX ORDER — HYDRALAZINE HYDROCHLORIDE 25 MG/1
75 TABLET, FILM COATED ORAL 3 TIMES DAILY
COMMUNITY
End: 2019-10-28 | Stop reason: SDUPTHER

## 2018-06-08 RX ORDER — LORATADINE 10 MG/1
10 TABLET ORAL DAILY
COMMUNITY

## 2018-06-08 RX ORDER — ATORVASTATIN CALCIUM 40 MG/1
40 TABLET, FILM COATED ORAL NIGHTLY
Status: DISCONTINUED | OUTPATIENT
Start: 2018-06-08 | End: 2018-06-14 | Stop reason: HOSPADM

## 2018-06-08 RX ORDER — DIAZEPAM 5 MG/1
2.5 TABLET ORAL 3 TIMES DAILY PRN
Status: DISCONTINUED | OUTPATIENT
Start: 2018-06-08 | End: 2018-06-14 | Stop reason: HOSPADM

## 2018-06-08 RX ORDER — SERTRALINE HYDROCHLORIDE 100 MG/1
100 TABLET, FILM COATED ORAL DAILY
COMMUNITY

## 2018-06-08 RX ORDER — ALBUTEROL SULFATE 2.5 MG/3ML
2.5 SOLUTION RESPIRATORY (INHALATION) PRN
Status: ON HOLD | COMMUNITY
Start: 2017-08-31 | End: 2019-02-26 | Stop reason: HOSPADM

## 2018-06-08 RX ORDER — FUROSEMIDE 40 MG/1
40 TABLET ORAL DAILY
Status: ON HOLD | COMMUNITY
End: 2018-06-12

## 2018-06-08 RX ORDER — ATORVASTATIN CALCIUM 40 MG/1
40 TABLET, FILM COATED ORAL NIGHTLY
Status: ON HOLD | COMMUNITY
End: 2022-01-01 | Stop reason: HOSPADM

## 2018-06-08 RX ORDER — FAMOTIDINE 20 MG/1
20 TABLET, FILM COATED ORAL 2 TIMES DAILY
Status: ON HOLD | COMMUNITY
End: 2019-02-26 | Stop reason: HOSPADM

## 2018-06-08 RX ORDER — NICOTINE 21 MG/24HR
1 PATCH, TRANSDERMAL 24 HOURS TRANSDERMAL DAILY
Status: DISCONTINUED | OUTPATIENT
Start: 2018-06-08 | End: 2018-06-14 | Stop reason: HOSPADM

## 2018-06-08 RX ORDER — ALBUTEROL SULFATE 90 UG/1
2 AEROSOL, METERED RESPIRATORY (INHALATION) EVERY 6 HOURS PRN
COMMUNITY
Start: 2017-08-31 | End: 2019-10-28 | Stop reason: SDUPTHER

## 2018-06-08 RX ORDER — HYDROCHLOROTHIAZIDE 12.5 MG/1
12.5 TABLET ORAL DAILY
Status: ON HOLD | COMMUNITY
Start: 2017-08-31 | End: 2019-02-26 | Stop reason: HOSPADM

## 2018-06-08 RX ORDER — FUROSEMIDE 40 MG/1
40 TABLET ORAL DAILY
Status: DISCONTINUED | OUTPATIENT
Start: 2018-06-08 | End: 2018-06-09

## 2018-06-08 RX ORDER — CETIRIZINE HYDROCHLORIDE 10 MG/1
10 TABLET ORAL DAILY
Status: DISCONTINUED | OUTPATIENT
Start: 2018-06-08 | End: 2018-06-14 | Stop reason: HOSPADM

## 2018-06-08 RX ORDER — PSEUDOEPHEDRINE HCL 120 MG/1
120 TABLET, FILM COATED, EXTENDED RELEASE ORAL ONCE
Status: COMPLETED | OUTPATIENT
Start: 2018-06-08 | End: 2018-06-08

## 2018-06-08 RX ORDER — GABAPENTIN 600 MG/1
400 TABLET ORAL 3 TIMES DAILY
Status: ON HOLD | COMMUNITY
End: 2022-01-01 | Stop reason: HOSPADM

## 2018-06-08 RX ORDER — AMLODIPINE BESYLATE 10 MG/1
10 TABLET ORAL DAILY
Status: ON HOLD | COMMUNITY
End: 2019-02-26 | Stop reason: HOSPADM

## 2018-06-08 RX ORDER — DIAZEPAM 5 MG/1
2.5 TABLET ORAL 3 TIMES DAILY PRN
Status: ON HOLD | COMMUNITY
End: 2019-02-26 | Stop reason: HOSPADM

## 2018-06-08 RX ORDER — IPRATROPIUM BROMIDE AND ALBUTEROL SULFATE 2.5; .5 MG/3ML; MG/3ML
1 SOLUTION RESPIRATORY (INHALATION)
Status: DISCONTINUED | OUTPATIENT
Start: 2018-06-08 | End: 2018-06-08

## 2018-06-08 RX ORDER — ALBUTEROL SULFATE 2.5 MG/3ML
2.5 SOLUTION RESPIRATORY (INHALATION) EVERY 6 HOURS PRN
Status: DISCONTINUED | OUTPATIENT
Start: 2018-06-08 | End: 2018-06-14 | Stop reason: HOSPADM

## 2018-06-08 RX ORDER — PSEUDOEPHEDRINE HCL 120 MG/1
120 TABLET, FILM COATED, EXTENDED RELEASE ORAL 2 TIMES DAILY
Status: DISCONTINUED | OUTPATIENT
Start: 2018-06-08 | End: 2018-06-14 | Stop reason: HOSPADM

## 2018-06-08 RX ORDER — FLUTICASONE PROPIONATE 50 MCG
1 SPRAY, SUSPENSION (ML) NASAL DAILY
COMMUNITY
End: 2019-10-28 | Stop reason: SDUPTHER

## 2018-06-08 RX ORDER — OXYCODONE HYDROCHLORIDE 5 MG/1
5 TABLET ORAL EVERY 6 HOURS PRN
Status: DISCONTINUED | OUTPATIENT
Start: 2018-06-08 | End: 2018-06-14 | Stop reason: HOSPADM

## 2018-06-08 RX ORDER — HYDROCHLOROTHIAZIDE 25 MG/1
12.5 TABLET ORAL DAILY
Status: DISCONTINUED | OUTPATIENT
Start: 2018-06-08 | End: 2018-06-14 | Stop reason: HOSPADM

## 2018-06-08 RX ADMIN — ALBUTEROL SULFATE 2.5 MG: 2.5 SOLUTION RESPIRATORY (INHALATION) at 20:30

## 2018-06-08 RX ADMIN — HYDROCHLOROTHIAZIDE 12.5 MG: 25 TABLET ORAL at 18:03

## 2018-06-08 RX ADMIN — FERROUS SULFATE TAB EC 325 MG (65 MG FE EQUIVALENT) 325 MG: 325 (65 FE) TABLET DELAYED RESPONSE at 17:58

## 2018-06-08 RX ADMIN — SERTRALINE 100 MG: 50 TABLET, FILM COATED ORAL at 17:56

## 2018-06-08 RX ADMIN — OXYCODONE HYDROCHLORIDE AND ACETAMINOPHEN 1 TABLET: 5; 325 TABLET ORAL at 12:24

## 2018-06-08 RX ADMIN — MOMETASONE FUROATE AND FORMOTEROL FUMARATE DIHYDRATE 2 PUFF: 200; 5 AEROSOL RESPIRATORY (INHALATION) at 20:30

## 2018-06-08 RX ADMIN — ALBUTEROL SULFATE 5 MG: 5 SOLUTION RESPIRATORY (INHALATION) at 07:44

## 2018-06-08 RX ADMIN — PSEUDOEPHEDRINE HYDROCHLORIDE 120 MG: 120 TABLET, FILM COATED, EXTENDED RELEASE ORAL at 08:52

## 2018-06-08 RX ADMIN — IPRATROPIUM BROMIDE 0.5 MG: 0.5 SOLUTION RESPIRATORY (INHALATION) at 07:44

## 2018-06-08 RX ADMIN — OXYCODONE HYDROCHLORIDE 5 MG: 5 TABLET ORAL at 19:52

## 2018-06-08 RX ADMIN — PSEUDOEPHEDRINE HYDROCHLORIDE 120 MG: 120 TABLET, FILM COATED, EXTENDED RELEASE ORAL at 16:52

## 2018-06-08 RX ADMIN — DIAZEPAM 2.5 MG: 5 TABLET ORAL at 18:02

## 2018-06-08 RX ADMIN — ALBUTEROL SULFATE 2.5 MG: 2.5 SOLUTION RESPIRATORY (INHALATION) at 16:17

## 2018-06-08 RX ADMIN — MAGNESIUM GLUCONATE 500 MG ORAL TABLET 400 MG: 500 TABLET ORAL at 21:03

## 2018-06-08 RX ADMIN — DESMOPRESSIN ACETATE 40 MG: 0.2 TABLET ORAL at 21:03

## 2018-06-08 RX ADMIN — FAMOTIDINE 20 MG: 20 TABLET, FILM COATED ORAL at 21:03

## 2018-06-08 RX ADMIN — GABAPENTIN 600 MG: 600 TABLET, FILM COATED ORAL at 21:03

## 2018-06-08 RX ADMIN — AMLODIPINE BESYLATE 10 MG: 10 TABLET ORAL at 17:56

## 2018-06-08 RX ADMIN — PHENYLEPHRINE HYDROCHLORIDE 1 SPRAY: 0.5 SPRAY NASAL at 07:25

## 2018-06-08 RX ADMIN — ENOXAPARIN SODIUM 40 MG: 100 INJECTION SUBCUTANEOUS at 21:03

## 2018-06-08 RX ADMIN — HYDRALAZINE HYDROCHLORIDE 75 MG: 50 TABLET, FILM COATED ORAL at 17:57

## 2018-06-08 RX ADMIN — Medication 10 ML: at 12:25

## 2018-06-08 RX ADMIN — ALBUTEROL SULFATE 2.5 MG: 2.5 SOLUTION RESPIRATORY (INHALATION) at 12:51

## 2018-06-08 RX ADMIN — METHYLPREDNISOLONE SODIUM SUCCINATE 125 MG: 125 INJECTION, POWDER, FOR SOLUTION INTRAMUSCULAR; INTRAVENOUS at 08:55

## 2018-06-08 RX ADMIN — Medication 10 ML: at 21:14

## 2018-06-08 RX ADMIN — OXYCODONE HYDROCHLORIDE 5 MG: 5 TABLET ORAL at 12:24

## 2018-06-08 RX ADMIN — FUROSEMIDE 40 MG: 40 TABLET ORAL at 17:58

## 2018-06-08 RX ADMIN — OXYCODONE HYDROCHLORIDE AND ACETAMINOPHEN 1 TABLET: 5; 325 TABLET ORAL at 19:52

## 2018-06-08 ASSESSMENT — PAIN SCALES - GENERAL
PAINLEVEL_OUTOF10: 6
PAINLEVEL_OUTOF10: 4
PAINLEVEL_OUTOF10: 6

## 2018-06-08 ASSESSMENT — PAIN DESCRIPTION - ORIENTATION: ORIENTATION: MID

## 2018-06-08 ASSESSMENT — ENCOUNTER SYMPTOMS
VOMITING: 0
SHORTNESS OF BREATH: 1
COLOR CHANGE: 0
ABDOMINAL PAIN: 0
NAUSEA: 0
EYE PAIN: 0
COUGH: 1

## 2018-06-08 ASSESSMENT — PAIN DESCRIPTION - LOCATION
LOCATION: CHEST
LOCATION: CHEST

## 2018-06-08 ASSESSMENT — PAIN DESCRIPTION - PAIN TYPE
TYPE: ACUTE PAIN
TYPE: ACUTE PAIN

## 2018-06-08 ASSESSMENT — PAIN DESCRIPTION - DESCRIPTORS: DESCRIPTORS: DISCOMFORT

## 2018-06-08 ASSESSMENT — PAIN DESCRIPTION - ONSET: ONSET: ON-GOING

## 2018-06-09 ENCOUNTER — APPOINTMENT (OUTPATIENT)
Dept: NUCLEAR MEDICINE | Age: 43
DRG: 192 | End: 2018-06-09
Payer: MEDICARE

## 2018-06-09 ENCOUNTER — APPOINTMENT (OUTPATIENT)
Dept: GENERAL RADIOLOGY | Age: 43
DRG: 192 | End: 2018-06-09
Payer: MEDICARE

## 2018-06-09 ENCOUNTER — APPOINTMENT (OUTPATIENT)
Dept: CT IMAGING | Age: 43
DRG: 192 | End: 2018-06-09
Payer: MEDICARE

## 2018-06-09 PROBLEM — I27.20 PULMONARY HYPERTENSION, MODERATE TO SEVERE (HCC): Status: ACTIVE | Noted: 2018-06-09

## 2018-06-09 LAB
ACTION: NORMAL
ALLEN TEST: ABNORMAL
ANION GAP SERPL CALCULATED.3IONS-SCNC: 15 MMOL/L (ref 9–17)
BNP INTERPRETATION: ABNORMAL
BUN BLDV-MCNC: 17 MG/DL (ref 6–20)
BUN/CREAT BLD: NORMAL (ref 9–20)
C-REACTIVE PROTEIN: 8 MG/L (ref 0–5)
CALCIUM SERPL-MCNC: 9 MG/DL (ref 8.6–10.4)
CHLORIDE BLD-SCNC: 101 MMOL/L (ref 98–107)
CO2: 27 MMOL/L (ref 20–31)
CREAT SERPL-MCNC: 0.66 MG/DL (ref 0.5–0.9)
D-DIMER QUANTITATIVE: 0.43 MG/L FEU
DATE AND TIME: NORMAL
FIO2: 3
GFR AFRICAN AMERICAN: >60 ML/MIN
GFR NON-AFRICAN AMERICAN: >60 ML/MIN
GFR SERPL CREATININE-BSD FRML MDRD: NORMAL ML/MIN/{1.73_M2}
GFR SERPL CREATININE-BSD FRML MDRD: NORMAL ML/MIN/{1.73_M2}
GLUCOSE BLD-MCNC: 97 MG/DL (ref 70–99)
HIV AG/AB: NONREACTIVE
MODE: ABNORMAL
NEGATIVE BASE EXCESS, ART: ABNORMAL (ref 0–2)
NOTIFY: NORMAL
O2 DEVICE/FLOW/%: ABNORMAL
PATIENT TEMP: ABNORMAL
POC HCO3: 34.2 MMOL/L (ref 21–28)
POC O2 SATURATION: 69 % (ref 94–98)
POC PCO2 TEMP: ABNORMAL MM HG
POC PCO2: 55.8 MM HG (ref 35–48)
POC PH TEMP: ABNORMAL
POC PH: 7.39 (ref 7.35–7.45)
POC PO2 TEMP: ABNORMAL MM HG
POC PO2: 37.5 MM HG (ref 83–108)
POSITIVE BASE EXCESS, ART: 8 (ref 0–3)
POTASSIUM SERPL-SCNC: 3.7 MMOL/L (ref 3.7–5.3)
PRO-BNP: 1054 PG/ML
READ BACK: NO
SAMPLE SITE: ABNORMAL
SODIUM BLD-SCNC: 143 MMOL/L (ref 135–144)
TCO2 (CALC), ART: 36 MMOL/L (ref 22–29)
TROPONIN INTERP: NORMAL
TROPONIN T: <0.03 NG/ML

## 2018-06-09 PROCEDURE — 85379 FIBRIN DEGRADATION QUANT: CPT

## 2018-06-09 PROCEDURE — 36600 WITHDRAWAL OF ARTERIAL BLOOD: CPT

## 2018-06-09 PROCEDURE — 2580000003 HC RX 258: Performed by: EMERGENCY MEDICINE

## 2018-06-09 PROCEDURE — 86235 NUCLEAR ANTIGEN ANTIBODY: CPT

## 2018-06-09 PROCEDURE — 36415 COLL VENOUS BLD VENIPUNCTURE: CPT

## 2018-06-09 PROCEDURE — 3430000000 HC RX DIAGNOSTIC RADIOPHARMACEUTICAL: Performed by: INTERNAL MEDICINE

## 2018-06-09 PROCEDURE — 93017 CV STRESS TEST TRACING ONLY: CPT

## 2018-06-09 PROCEDURE — 6370000000 HC RX 637 (ALT 250 FOR IP): Performed by: STUDENT IN AN ORGANIZED HEALTH CARE EDUCATION/TRAINING PROGRAM

## 2018-06-09 PROCEDURE — 86140 C-REACTIVE PROTEIN: CPT

## 2018-06-09 PROCEDURE — 6360000002 HC RX W HCPCS: Performed by: EMERGENCY MEDICINE

## 2018-06-09 PROCEDURE — 6370000000 HC RX 637 (ALT 250 FOR IP): Performed by: EMERGENCY MEDICINE

## 2018-06-09 PROCEDURE — 2580000003 HC RX 258: Performed by: INTERNAL MEDICINE

## 2018-06-09 PROCEDURE — 94640 AIRWAY INHALATION TREATMENT: CPT

## 2018-06-09 PROCEDURE — 82803 BLOOD GASES ANY COMBINATION: CPT

## 2018-06-09 PROCEDURE — 1200000000 HC SEMI PRIVATE

## 2018-06-09 PROCEDURE — 99255 IP/OBS CONSLTJ NEW/EST HI 80: CPT | Performed by: INTERNAL MEDICINE

## 2018-06-09 PROCEDURE — 84484 ASSAY OF TROPONIN QUANT: CPT

## 2018-06-09 PROCEDURE — 99223 1ST HOSP IP/OBS HIGH 75: CPT | Performed by: FAMILY MEDICINE

## 2018-06-09 PROCEDURE — A9500 TC99M SESTAMIBI: HCPCS | Performed by: INTERNAL MEDICINE

## 2018-06-09 PROCEDURE — 6360000002 HC RX W HCPCS: Performed by: INTERNAL MEDICINE

## 2018-06-09 PROCEDURE — 86038 ANTINUCLEAR ANTIBODIES: CPT

## 2018-06-09 PROCEDURE — 94660 CPAP INITIATION&MGMT: CPT

## 2018-06-09 PROCEDURE — 83880 ASSAY OF NATRIURETIC PEPTIDE: CPT

## 2018-06-09 PROCEDURE — 93005 ELECTROCARDIOGRAM TRACING: CPT

## 2018-06-09 PROCEDURE — 6360000004 HC RX CONTRAST MEDICATION: Performed by: FAMILY MEDICINE

## 2018-06-09 PROCEDURE — 71045 X-RAY EXAM CHEST 1 VIEW: CPT

## 2018-06-09 PROCEDURE — 80048 BASIC METABOLIC PNL TOTAL CA: CPT

## 2018-06-09 PROCEDURE — 87389 HIV-1 AG W/HIV-1&-2 AB AG IA: CPT

## 2018-06-09 PROCEDURE — 71260 CT THORAX DX C+: CPT

## 2018-06-09 PROCEDURE — 78452 HT MUSCLE IMAGE SPECT MULT: CPT

## 2018-06-09 RX ORDER — NITROGLYCERIN 0.4 MG/1
0.4 TABLET SUBLINGUAL EVERY 5 MIN PRN
Status: DISCONTINUED | OUTPATIENT
Start: 2018-06-09 | End: 2018-06-09 | Stop reason: ALTCHOICE

## 2018-06-09 RX ORDER — SODIUM CHLORIDE 9 MG/ML
INJECTION, SOLUTION INTRAVENOUS ONCE
Status: DISCONTINUED | OUTPATIENT
Start: 2018-06-09 | End: 2018-06-09 | Stop reason: ALTCHOICE

## 2018-06-09 RX ORDER — FUROSEMIDE 10 MG/ML
40 INJECTION INTRAMUSCULAR; INTRAVENOUS DAILY
Status: COMPLETED | OUTPATIENT
Start: 2018-06-09 | End: 2018-06-11

## 2018-06-09 RX ORDER — SODIUM CHLORIDE 0.9 % (FLUSH) 0.9 %
10 SYRINGE (ML) INJECTION PRN
Status: DISCONTINUED | OUTPATIENT
Start: 2018-06-09 | End: 2018-06-09 | Stop reason: ALTCHOICE

## 2018-06-09 RX ORDER — METOPROLOL TARTRATE 5 MG/5ML
2.5 INJECTION INTRAVENOUS PRN
Status: DISCONTINUED | OUTPATIENT
Start: 2018-06-09 | End: 2018-06-09 | Stop reason: ALTCHOICE

## 2018-06-09 RX ORDER — AMINOPHYLLINE DIHYDRATE 25 MG/ML
100 INJECTION, SOLUTION INTRAVENOUS
Status: DISCONTINUED | OUTPATIENT
Start: 2018-06-09 | End: 2018-06-09 | Stop reason: ALTCHOICE

## 2018-06-09 RX ADMIN — ALBUTEROL SULFATE 2.5 MG: 2.5 SOLUTION RESPIRATORY (INHALATION) at 14:42

## 2018-06-09 RX ADMIN — GABAPENTIN 600 MG: 600 TABLET, FILM COATED ORAL at 09:39

## 2018-06-09 RX ADMIN — Medication 10 ML: at 09:40

## 2018-06-09 RX ADMIN — TIOTROPIUM BROMIDE 18 MCG: 18 CAPSULE ORAL; RESPIRATORY (INHALATION) at 09:26

## 2018-06-09 RX ADMIN — SERTRALINE 100 MG: 50 TABLET, FILM COATED ORAL at 09:39

## 2018-06-09 RX ADMIN — FAMOTIDINE 20 MG: 20 TABLET, FILM COATED ORAL at 09:39

## 2018-06-09 RX ADMIN — DESMOPRESSIN ACETATE 40 MG: 0.2 TABLET ORAL at 21:29

## 2018-06-09 RX ADMIN — MAGNESIUM GLUCONATE 500 MG ORAL TABLET 400 MG: 500 TABLET ORAL at 09:39

## 2018-06-09 RX ADMIN — ENOXAPARIN SODIUM 40 MG: 100 INJECTION SUBCUTANEOUS at 21:30

## 2018-06-09 RX ADMIN — DIAZEPAM 2.5 MG: 5 TABLET ORAL at 09:50

## 2018-06-09 RX ADMIN — ALBUTEROL SULFATE 2.5 MG: 2.5 SOLUTION RESPIRATORY (INHALATION) at 03:35

## 2018-06-09 RX ADMIN — HYDRALAZINE HYDROCHLORIDE 75 MG: 50 TABLET, FILM COATED ORAL at 09:39

## 2018-06-09 RX ADMIN — OXYCODONE HYDROCHLORIDE 5 MG: 5 TABLET ORAL at 03:22

## 2018-06-09 RX ADMIN — Medication 10 ML: at 21:32

## 2018-06-09 RX ADMIN — GABAPENTIN 600 MG: 600 TABLET, FILM COATED ORAL at 21:29

## 2018-06-09 RX ADMIN — REGADENOSON 0.4 MG: 0.08 INJECTION, SOLUTION INTRAVENOUS at 11:48

## 2018-06-09 RX ADMIN — CETIRIZINE HYDROCHLORIDE 10 MG: 10 TABLET ORAL at 09:39

## 2018-06-09 RX ADMIN — MOMETASONE FUROATE AND FORMOTEROL FUMARATE DIHYDRATE 2 PUFF: 200; 5 AEROSOL RESPIRATORY (INHALATION) at 23:43

## 2018-06-09 RX ADMIN — OXYCODONE HYDROCHLORIDE 5 MG: 5 TABLET ORAL at 21:30

## 2018-06-09 RX ADMIN — OXYCODONE HYDROCHLORIDE AND ACETAMINOPHEN 1 TABLET: 5; 325 TABLET ORAL at 09:50

## 2018-06-09 RX ADMIN — IOPAMIDOL 85 ML: 755 INJECTION, SOLUTION INTRAVENOUS at 20:00

## 2018-06-09 RX ADMIN — MOMETASONE FUROATE AND FORMOTEROL FUMARATE DIHYDRATE 2 PUFF: 200; 5 AEROSOL RESPIRATORY (INHALATION) at 09:26

## 2018-06-09 RX ADMIN — OXYCODONE HYDROCHLORIDE AND ACETAMINOPHEN 1 TABLET: 5; 325 TABLET ORAL at 15:38

## 2018-06-09 RX ADMIN — OXYCODONE HYDROCHLORIDE 5 MG: 5 TABLET ORAL at 09:49

## 2018-06-09 RX ADMIN — MAGNESIUM GLUCONATE 500 MG ORAL TABLET 400 MG: 500 TABLET ORAL at 21:29

## 2018-06-09 RX ADMIN — AMLODIPINE BESYLATE 10 MG: 10 TABLET ORAL at 09:40

## 2018-06-09 RX ADMIN — FERROUS SULFATE TAB EC 325 MG (65 MG FE EQUIVALENT) 325 MG: 325 (65 FE) TABLET DELAYED RESPONSE at 09:39

## 2018-06-09 RX ADMIN — ALBUTEROL SULFATE 2.5 MG: 2.5 SOLUTION RESPIRATORY (INHALATION) at 09:26

## 2018-06-09 RX ADMIN — OXYCODONE HYDROCHLORIDE AND ACETAMINOPHEN 1 TABLET: 5; 325 TABLET ORAL at 21:29

## 2018-06-09 RX ADMIN — DIAZEPAM 2.5 MG: 5 TABLET ORAL at 03:23

## 2018-06-09 RX ADMIN — ENOXAPARIN SODIUM 40 MG: 100 INJECTION SUBCUTANEOUS at 09:39

## 2018-06-09 RX ADMIN — OXYCODONE HYDROCHLORIDE AND ACETAMINOPHEN 1 TABLET: 5; 325 TABLET ORAL at 03:23

## 2018-06-09 RX ADMIN — FAMOTIDINE 20 MG: 20 TABLET, FILM COATED ORAL at 21:29

## 2018-06-09 RX ADMIN — PSEUDOEPHEDRINE HYDROCHLORIDE 120 MG: 120 TABLET, FILM COATED, EXTENDED RELEASE ORAL at 16:54

## 2018-06-09 RX ADMIN — FUROSEMIDE 40 MG: 10 INJECTION, SOLUTION INTRAMUSCULAR; INTRAVENOUS at 14:33

## 2018-06-09 RX ADMIN — FERROUS SULFATE TAB EC 325 MG (65 MG FE EQUIVALENT) 325 MG: 325 (65 FE) TABLET DELAYED RESPONSE at 21:29

## 2018-06-09 RX ADMIN — ALBUTEROL SULFATE 2.5 MG: 2.5 SOLUTION RESPIRATORY (INHALATION) at 23:43

## 2018-06-09 RX ADMIN — TETRAKIS(2-METHOXYISOBUTYLISOCYANIDE)COPPER(I) TETRAFLUOROBORATE 42 MILLICURIE: 1 INJECTION, POWDER, LYOPHILIZED, FOR SOLUTION INTRAVENOUS at 11:50

## 2018-06-09 RX ADMIN — GABAPENTIN 600 MG: 600 TABLET, FILM COATED ORAL at 15:38

## 2018-06-09 RX ADMIN — PSEUDOEPHEDRINE HYDROCHLORIDE 120 MG: 120 TABLET, FILM COATED, EXTENDED RELEASE ORAL at 21:29

## 2018-06-09 RX ADMIN — HYDROCHLOROTHIAZIDE 12.5 MG: 25 TABLET ORAL at 09:39

## 2018-06-09 RX ADMIN — SODIUM CHLORIDE, PRESERVATIVE FREE 10 ML: 5 INJECTION INTRAVENOUS at 11:50

## 2018-06-09 RX ADMIN — OXYCODONE HYDROCHLORIDE 5 MG: 5 TABLET ORAL at 15:39

## 2018-06-09 RX ADMIN — PSEUDOEPHEDRINE HYDROCHLORIDE 120 MG: 120 TABLET, FILM COATED, EXTENDED RELEASE ORAL at 04:32

## 2018-06-09 ASSESSMENT — PAIN SCALES - GENERAL
PAINLEVEL_OUTOF10: 6
PAINLEVEL_OUTOF10: 3
PAINLEVEL_OUTOF10: 5
PAINLEVEL_OUTOF10: 3
PAINLEVEL_OUTOF10: 5
PAINLEVEL_OUTOF10: 5
PAINLEVEL_OUTOF10: 4
PAINLEVEL_OUTOF10: 5
PAINLEVEL_OUTOF10: 7
PAINLEVEL_OUTOF10: 4

## 2018-06-09 ASSESSMENT — ENCOUNTER SYMPTOMS
VOICE CHANGE: 0
SORE THROAT: 0
ABDOMINAL PAIN: 0
NAUSEA: 0
SINUS PRESSURE: 0
BLOOD IN STOOL: 0
SHORTNESS OF BREATH: 1
COUGH: 0
CONSTIPATION: 0
DIARRHEA: 0
VOMITING: 0
WHEEZING: 0

## 2018-06-10 LAB
LV EF: 57 %
LVEF MODALITY: NORMAL
TROPONIN INTERP: NORMAL
TROPONIN INTERP: NORMAL
TROPONIN T: <0.03 NG/ML
TROPONIN T: <0.03 NG/ML

## 2018-06-10 PROCEDURE — 6370000000 HC RX 637 (ALT 250 FOR IP): Performed by: EMERGENCY MEDICINE

## 2018-06-10 PROCEDURE — 2580000003 HC RX 258: Performed by: EMERGENCY MEDICINE

## 2018-06-10 PROCEDURE — 97162 PT EVAL MOD COMPLEX 30 MIN: CPT

## 2018-06-10 PROCEDURE — 3430000000 HC RX DIAGNOSTIC RADIOPHARMACEUTICAL: Performed by: INTERNAL MEDICINE

## 2018-06-10 PROCEDURE — 97530 THERAPEUTIC ACTIVITIES: CPT

## 2018-06-10 PROCEDURE — 97166 OT EVAL MOD COMPLEX 45 MIN: CPT

## 2018-06-10 PROCEDURE — G8987 SELF CARE CURRENT STATUS: HCPCS

## 2018-06-10 PROCEDURE — G8979 MOBILITY GOAL STATUS: HCPCS

## 2018-06-10 PROCEDURE — 97535 SELF CARE MNGMENT TRAINING: CPT

## 2018-06-10 PROCEDURE — 99233 SBSQ HOSP IP/OBS HIGH 50: CPT | Performed by: INTERNAL MEDICINE

## 2018-06-10 PROCEDURE — 6360000002 HC RX W HCPCS: Performed by: INTERNAL MEDICINE

## 2018-06-10 PROCEDURE — 1200000000 HC SEMI PRIVATE

## 2018-06-10 PROCEDURE — 6370000000 HC RX 637 (ALT 250 FOR IP): Performed by: STUDENT IN AN ORGANIZED HEALTH CARE EDUCATION/TRAINING PROGRAM

## 2018-06-10 PROCEDURE — A9500 TC99M SESTAMIBI: HCPCS | Performed by: INTERNAL MEDICINE

## 2018-06-10 PROCEDURE — 99232 SBSQ HOSP IP/OBS MODERATE 35: CPT | Performed by: FAMILY MEDICINE

## 2018-06-10 PROCEDURE — 94762 N-INVAS EAR/PLS OXIMTRY CONT: CPT

## 2018-06-10 PROCEDURE — 6360000002 HC RX W HCPCS: Performed by: EMERGENCY MEDICINE

## 2018-06-10 PROCEDURE — 87205 SMEAR GRAM STAIN: CPT

## 2018-06-10 PROCEDURE — 84484 ASSAY OF TROPONIN QUANT: CPT

## 2018-06-10 PROCEDURE — 2580000003 HC RX 258: Performed by: INTERNAL MEDICINE

## 2018-06-10 PROCEDURE — 94640 AIRWAY INHALATION TREATMENT: CPT

## 2018-06-10 PROCEDURE — G8978 MOBILITY CURRENT STATUS: HCPCS

## 2018-06-10 PROCEDURE — 87070 CULTURE OTHR SPECIMN AEROBIC: CPT

## 2018-06-10 PROCEDURE — G8988 SELF CARE GOAL STATUS: HCPCS

## 2018-06-10 PROCEDURE — 36415 COLL VENOUS BLD VENIPUNCTURE: CPT

## 2018-06-10 RX ORDER — SODIUM CHLORIDE 0.9 % (FLUSH) 0.9 %
10 SYRINGE (ML) INJECTION PRN
Status: DISCONTINUED | OUTPATIENT
Start: 2018-06-10 | End: 2018-06-14 | Stop reason: HOSPADM

## 2018-06-10 RX ADMIN — PSEUDOEPHEDRINE HYDROCHLORIDE 120 MG: 120 TABLET, FILM COATED, EXTENDED RELEASE ORAL at 21:57

## 2018-06-10 RX ADMIN — FERROUS SULFATE TAB EC 325 MG (65 MG FE EQUIVALENT) 325 MG: 325 (65 FE) TABLET DELAYED RESPONSE at 09:38

## 2018-06-10 RX ADMIN — OXYCODONE HYDROCHLORIDE AND ACETAMINOPHEN 1 TABLET: 5; 325 TABLET ORAL at 08:19

## 2018-06-10 RX ADMIN — CETIRIZINE HYDROCHLORIDE 10 MG: 10 TABLET ORAL at 09:38

## 2018-06-10 RX ADMIN — PSEUDOEPHEDRINE HYDROCHLORIDE 120 MG: 120 TABLET, FILM COATED, EXTENDED RELEASE ORAL at 09:38

## 2018-06-10 RX ADMIN — OXYCODONE HYDROCHLORIDE 5 MG: 5 TABLET ORAL at 08:19

## 2018-06-10 RX ADMIN — GABAPENTIN 600 MG: 600 TABLET, FILM COATED ORAL at 14:38

## 2018-06-10 RX ADMIN — FAMOTIDINE 20 MG: 20 TABLET, FILM COATED ORAL at 21:57

## 2018-06-10 RX ADMIN — OXYCODONE HYDROCHLORIDE 5 MG: 5 TABLET ORAL at 14:38

## 2018-06-10 RX ADMIN — FERROUS SULFATE TAB EC 325 MG (65 MG FE EQUIVALENT) 325 MG: 325 (65 FE) TABLET DELAYED RESPONSE at 16:59

## 2018-06-10 RX ADMIN — FAMOTIDINE 20 MG: 20 TABLET, FILM COATED ORAL at 09:38

## 2018-06-10 RX ADMIN — SERTRALINE 100 MG: 50 TABLET, FILM COATED ORAL at 09:38

## 2018-06-10 RX ADMIN — GABAPENTIN 600 MG: 600 TABLET, FILM COATED ORAL at 21:57

## 2018-06-10 RX ADMIN — HYDROCHLOROTHIAZIDE 12.5 MG: 25 TABLET ORAL at 09:38

## 2018-06-10 RX ADMIN — ALBUTEROL SULFATE 2.5 MG: 2.5 SOLUTION RESPIRATORY (INHALATION) at 12:42

## 2018-06-10 RX ADMIN — FUROSEMIDE 40 MG: 10 INJECTION, SOLUTION INTRAMUSCULAR; INTRAVENOUS at 09:39

## 2018-06-10 RX ADMIN — TETRAKIS(2-METHOXYISOBUTYLISOCYANIDE)COPPER(I) TETRAFLUOROBORATE 46 MILLICURIE: 1 INJECTION, POWDER, LYOPHILIZED, FOR SOLUTION INTRAVENOUS at 07:51

## 2018-06-10 RX ADMIN — MAGNESIUM GLUCONATE 500 MG ORAL TABLET 400 MG: 500 TABLET ORAL at 21:57

## 2018-06-10 RX ADMIN — DESMOPRESSIN ACETATE 40 MG: 0.2 TABLET ORAL at 21:57

## 2018-06-10 RX ADMIN — OXYCODONE HYDROCHLORIDE AND ACETAMINOPHEN 1 TABLET: 5; 325 TABLET ORAL at 14:38

## 2018-06-10 RX ADMIN — ALBUTEROL SULFATE 2.5 MG: 2.5 SOLUTION RESPIRATORY (INHALATION) at 22:42

## 2018-06-10 RX ADMIN — OXYCODONE HYDROCHLORIDE 5 MG: 5 TABLET ORAL at 20:30

## 2018-06-10 RX ADMIN — OXYCODONE HYDROCHLORIDE AND ACETAMINOPHEN 1 TABLET: 5; 325 TABLET ORAL at 20:30

## 2018-06-10 RX ADMIN — Medication 10 ML: at 21:58

## 2018-06-10 RX ADMIN — Medication 10 ML: at 09:39

## 2018-06-10 RX ADMIN — ENOXAPARIN SODIUM 40 MG: 100 INJECTION SUBCUTANEOUS at 21:57

## 2018-06-10 RX ADMIN — AMLODIPINE BESYLATE 10 MG: 10 TABLET ORAL at 09:38

## 2018-06-10 RX ADMIN — GABAPENTIN 600 MG: 600 TABLET, FILM COATED ORAL at 09:38

## 2018-06-10 RX ADMIN — ENOXAPARIN SODIUM 40 MG: 100 INJECTION SUBCUTANEOUS at 09:38

## 2018-06-10 RX ADMIN — SODIUM CHLORIDE, PRESERVATIVE FREE 10 ML: 5 INJECTION INTRAVENOUS at 07:51

## 2018-06-10 RX ADMIN — HYDRALAZINE HYDROCHLORIDE 75 MG: 50 TABLET, FILM COATED ORAL at 09:38

## 2018-06-10 RX ADMIN — MAGNESIUM GLUCONATE 500 MG ORAL TABLET 400 MG: 500 TABLET ORAL at 09:38

## 2018-06-10 RX ADMIN — ALBUTEROL SULFATE 2.5 MG: 2.5 SOLUTION RESPIRATORY (INHALATION) at 17:10

## 2018-06-10 ASSESSMENT — ENCOUNTER SYMPTOMS
VOMITING: 0
NAUSEA: 0
CONSTIPATION: 0
SORE THROAT: 0
SHORTNESS OF BREATH: 1
DIARRHEA: 0
WHEEZING: 0
BLOOD IN STOOL: 0
VOICE CHANGE: 0
COUGH: 0
SINUS PRESSURE: 0
ABDOMINAL PAIN: 0

## 2018-06-10 ASSESSMENT — PAIN DESCRIPTION - PROGRESSION
CLINICAL_PROGRESSION: NOT CHANGED
CLINICAL_PROGRESSION: NOT CHANGED

## 2018-06-10 ASSESSMENT — PAIN DESCRIPTION - ORIENTATION
ORIENTATION: RIGHT
ORIENTATION: RIGHT;LOWER

## 2018-06-10 ASSESSMENT — PAIN DESCRIPTION - ONSET
ONSET: ON-GOING
ONSET: ON-GOING

## 2018-06-10 ASSESSMENT — PAIN DESCRIPTION - FREQUENCY
FREQUENCY: CONTINUOUS
FREQUENCY: CONTINUOUS

## 2018-06-10 ASSESSMENT — PAIN DESCRIPTION - DESCRIPTORS
DESCRIPTORS: ACHING;CONSTANT
DESCRIPTORS: ACHING;DISCOMFORT

## 2018-06-10 ASSESSMENT — PAIN SCALES - GENERAL
PAINLEVEL_OUTOF10: 2
PAINLEVEL_OUTOF10: 6
PAINLEVEL_OUTOF10: 6
PAINLEVEL_OUTOF10: 2
PAINLEVEL_OUTOF10: 7
PAINLEVEL_OUTOF10: 6
PAINLEVEL_OUTOF10: 6
PAINLEVEL_OUTOF10: 7

## 2018-06-10 ASSESSMENT — PAIN DESCRIPTION - LOCATION
LOCATION: ARM;LEG;CHEST
LOCATION: ARM

## 2018-06-10 ASSESSMENT — PAIN DESCRIPTION - PAIN TYPE
TYPE: CHRONIC PAIN
TYPE: CHRONIC PAIN

## 2018-06-11 LAB
ANION GAP SERPL CALCULATED.3IONS-SCNC: 14 MMOL/L (ref 9–17)
ANTI JO-1 IGG: 6 U/ML
ANTI SSA: 15 U/ML
ANTI SSB: 11 U/ML
ANTI-NUCLEAR ANTIBODY (ANA): NEGATIVE
ANTI-SCLERODERMA: 31 U/ML
BUN BLDV-MCNC: 23 MG/DL (ref 6–20)
BUN/CREAT BLD: ABNORMAL (ref 9–20)
CALCIUM SERPL-MCNC: 9.5 MG/DL (ref 8.6–10.4)
CHLORIDE BLD-SCNC: 94 MMOL/L (ref 98–107)
CO2: 31 MMOL/L (ref 20–31)
CREAT SERPL-MCNC: 0.77 MG/DL (ref 0.5–0.9)
EKG ATRIAL RATE: 73 BPM
EKG ATRIAL RATE: 98 BPM
EKG P AXIS: 53 DEGREES
EKG P AXIS: 54 DEGREES
EKG P-R INTERVAL: 150 MS
EKG P-R INTERVAL: 152 MS
EKG Q-T INTERVAL: 358 MS
EKG Q-T INTERVAL: 410 MS
EKG QRS DURATION: 84 MS
EKG QRS DURATION: 88 MS
EKG QTC CALCULATION (BAZETT): 451 MS
EKG QTC CALCULATION (BAZETT): 457 MS
EKG R AXIS: 11 DEGREES
EKG R AXIS: 13 DEGREES
EKG T AXIS: 11 DEGREES
EKG T AXIS: 28 DEGREES
EKG VENTRICULAR RATE: 73 BPM
EKG VENTRICULAR RATE: 98 BPM
GFR AFRICAN AMERICAN: >60 ML/MIN
GFR NON-AFRICAN AMERICAN: >60 ML/MIN
GFR SERPL CREATININE-BSD FRML MDRD: ABNORMAL ML/MIN/{1.73_M2}
GFR SERPL CREATININE-BSD FRML MDRD: ABNORMAL ML/MIN/{1.73_M2}
GLUCOSE BLD-MCNC: 133 MG/DL (ref 70–99)
MAGNESIUM: 1.8 MG/DL (ref 1.6–2.6)
POTASSIUM SERPL-SCNC: 4.4 MMOL/L (ref 3.7–5.3)
SODIUM BLD-SCNC: 139 MMOL/L (ref 135–144)

## 2018-06-11 PROCEDURE — 6370000000 HC RX 637 (ALT 250 FOR IP): Performed by: EMERGENCY MEDICINE

## 2018-06-11 PROCEDURE — 6370000000 HC RX 637 (ALT 250 FOR IP): Performed by: STUDENT IN AN ORGANIZED HEALTH CARE EDUCATION/TRAINING PROGRAM

## 2018-06-11 PROCEDURE — 6360000002 HC RX W HCPCS: Performed by: EMERGENCY MEDICINE

## 2018-06-11 PROCEDURE — 80048 BASIC METABOLIC PNL TOTAL CA: CPT

## 2018-06-11 PROCEDURE — 2580000003 HC RX 258: Performed by: EMERGENCY MEDICINE

## 2018-06-11 PROCEDURE — 94640 AIRWAY INHALATION TREATMENT: CPT

## 2018-06-11 PROCEDURE — 36415 COLL VENOUS BLD VENIPUNCTURE: CPT

## 2018-06-11 PROCEDURE — 6360000002 HC RX W HCPCS: Performed by: INTERNAL MEDICINE

## 2018-06-11 PROCEDURE — 1200000000 HC SEMI PRIVATE

## 2018-06-11 PROCEDURE — 99232 SBSQ HOSP IP/OBS MODERATE 35: CPT | Performed by: FAMILY MEDICINE

## 2018-06-11 PROCEDURE — 76937 US GUIDE VASCULAR ACCESS: CPT

## 2018-06-11 PROCEDURE — 83735 ASSAY OF MAGNESIUM: CPT

## 2018-06-11 PROCEDURE — 97535 SELF CARE MNGMENT TRAINING: CPT

## 2018-06-11 PROCEDURE — 94762 N-INVAS EAR/PLS OXIMTRY CONT: CPT

## 2018-06-11 PROCEDURE — 99232 SBSQ HOSP IP/OBS MODERATE 35: CPT | Performed by: INTERNAL MEDICINE

## 2018-06-11 RX ADMIN — FAMOTIDINE 20 MG: 20 TABLET, FILM COATED ORAL at 21:11

## 2018-06-11 RX ADMIN — SERTRALINE 100 MG: 50 TABLET, FILM COATED ORAL at 08:40

## 2018-06-11 RX ADMIN — CETIRIZINE HYDROCHLORIDE 10 MG: 10 TABLET ORAL at 08:41

## 2018-06-11 RX ADMIN — Medication 10 ML: at 08:42

## 2018-06-11 RX ADMIN — FERROUS SULFATE TAB EC 325 MG (65 MG FE EQUIVALENT) 325 MG: 325 (65 FE) TABLET DELAYED RESPONSE at 08:41

## 2018-06-11 RX ADMIN — FUROSEMIDE 40 MG: 10 INJECTION, SOLUTION INTRAMUSCULAR; INTRAVENOUS at 08:41

## 2018-06-11 RX ADMIN — ALBUTEROL SULFATE 2.5 MG: 2.5 SOLUTION RESPIRATORY (INHALATION) at 20:03

## 2018-06-11 RX ADMIN — TIOTROPIUM BROMIDE 18 MCG: 18 CAPSULE ORAL; RESPIRATORY (INHALATION) at 09:06

## 2018-06-11 RX ADMIN — FAMOTIDINE 20 MG: 20 TABLET, FILM COATED ORAL at 08:41

## 2018-06-11 RX ADMIN — HYDROCHLOROTHIAZIDE 12.5 MG: 25 TABLET ORAL at 08:41

## 2018-06-11 RX ADMIN — MAGNESIUM GLUCONATE 500 MG ORAL TABLET 400 MG: 500 TABLET ORAL at 08:40

## 2018-06-11 RX ADMIN — OXYCODONE HYDROCHLORIDE 5 MG: 5 TABLET ORAL at 14:52

## 2018-06-11 RX ADMIN — AMLODIPINE BESYLATE 10 MG: 10 TABLET ORAL at 08:41

## 2018-06-11 RX ADMIN — MAGNESIUM GLUCONATE 500 MG ORAL TABLET 400 MG: 500 TABLET ORAL at 21:11

## 2018-06-11 RX ADMIN — GABAPENTIN 600 MG: 600 TABLET, FILM COATED ORAL at 14:22

## 2018-06-11 RX ADMIN — PSEUDOEPHEDRINE HYDROCHLORIDE 120 MG: 120 TABLET, FILM COATED, EXTENDED RELEASE ORAL at 21:11

## 2018-06-11 RX ADMIN — DESMOPRESSIN ACETATE 40 MG: 0.2 TABLET ORAL at 21:11

## 2018-06-11 RX ADMIN — OXYCODONE HYDROCHLORIDE 5 MG: 5 TABLET ORAL at 08:40

## 2018-06-11 RX ADMIN — HYDRALAZINE HYDROCHLORIDE 75 MG: 50 TABLET, FILM COATED ORAL at 08:41

## 2018-06-11 RX ADMIN — ALBUTEROL SULFATE 2.5 MG: 2.5 SOLUTION RESPIRATORY (INHALATION) at 12:54

## 2018-06-11 RX ADMIN — ENOXAPARIN SODIUM 40 MG: 100 INJECTION SUBCUTANEOUS at 21:12

## 2018-06-11 RX ADMIN — ENOXAPARIN SODIUM 40 MG: 100 INJECTION SUBCUTANEOUS at 08:41

## 2018-06-11 RX ADMIN — OXYCODONE HYDROCHLORIDE AND ACETAMINOPHEN 1 TABLET: 5; 325 TABLET ORAL at 08:40

## 2018-06-11 RX ADMIN — FERROUS SULFATE TAB EC 325 MG (65 MG FE EQUIVALENT) 325 MG: 325 (65 FE) TABLET DELAYED RESPONSE at 16:46

## 2018-06-11 RX ADMIN — ALBUTEROL SULFATE 2.5 MG: 2.5 SOLUTION RESPIRATORY (INHALATION) at 09:06

## 2018-06-11 RX ADMIN — GABAPENTIN 600 MG: 600 TABLET, FILM COATED ORAL at 21:11

## 2018-06-11 RX ADMIN — MOMETASONE FUROATE AND FORMOTEROL FUMARATE DIHYDRATE 2 PUFF: 200; 5 AEROSOL RESPIRATORY (INHALATION) at 20:03

## 2018-06-11 RX ADMIN — OXYCODONE HYDROCHLORIDE AND ACETAMINOPHEN 1 TABLET: 5; 325 TABLET ORAL at 21:16

## 2018-06-11 RX ADMIN — GABAPENTIN 600 MG: 600 TABLET, FILM COATED ORAL at 08:41

## 2018-06-11 RX ADMIN — OXYCODONE HYDROCHLORIDE AND ACETAMINOPHEN 1 TABLET: 5; 325 TABLET ORAL at 14:52

## 2018-06-11 RX ADMIN — OXYCODONE HYDROCHLORIDE 5 MG: 5 TABLET ORAL at 21:16

## 2018-06-11 RX ADMIN — PSEUDOEPHEDRINE HYDROCHLORIDE 120 MG: 120 TABLET, FILM COATED, EXTENDED RELEASE ORAL at 08:41

## 2018-06-11 ASSESSMENT — ENCOUNTER SYMPTOMS
VOICE CHANGE: 0
SORE THROAT: 0
WHEEZING: 0
CONSTIPATION: 0
DIARRHEA: 0
SINUS PRESSURE: 0
NAUSEA: 0
SHORTNESS OF BREATH: 1
BLOOD IN STOOL: 0
ABDOMINAL PAIN: 0
COUGH: 0
VOMITING: 0

## 2018-06-11 ASSESSMENT — PAIN SCALES - GENERAL
PAINLEVEL_OUTOF10: 9
PAINLEVEL_OUTOF10: 1
PAINLEVEL_OUTOF10: 5
PAINLEVEL_OUTOF10: 5
PAINLEVEL_OUTOF10: 6
PAINLEVEL_OUTOF10: 4

## 2018-06-11 ASSESSMENT — PAIN DESCRIPTION - PAIN TYPE
TYPE: ACUTE PAIN
TYPE: ACUTE PAIN

## 2018-06-11 ASSESSMENT — PAIN DESCRIPTION - LOCATION
LOCATION: CHEST
LOCATION: CHEST

## 2018-06-12 ENCOUNTER — APPOINTMENT (OUTPATIENT)
Dept: CARDIAC CATH/INVASIVE PROCEDURES | Age: 43
DRG: 192 | End: 2018-06-12
Payer: MEDICARE

## 2018-06-12 LAB
ANION GAP SERPL CALCULATED.3IONS-SCNC: 11 MMOL/L (ref 9–17)
BUN BLDV-MCNC: 24 MG/DL (ref 6–20)
BUN/CREAT BLD: ABNORMAL (ref 9–20)
CALCIUM SERPL-MCNC: 8.4 MG/DL (ref 8.6–10.4)
CHLORIDE BLD-SCNC: 95 MMOL/L (ref 98–107)
CO2: 32 MMOL/L (ref 20–31)
CREAT SERPL-MCNC: 0.76 MG/DL (ref 0.5–0.9)
CULTURE: NORMAL
CULTURE: NORMAL
DIRECT EXAM: NORMAL
EKG ATRIAL RATE: 81 BPM
EKG P AXIS: 33 DEGREES
EKG P-R INTERVAL: 150 MS
EKG Q-T INTERVAL: 406 MS
EKG QRS DURATION: 82 MS
EKG QTC CALCULATION (BAZETT): 471 MS
EKG R AXIS: 16 DEGREES
EKG T AXIS: 19 DEGREES
EKG VENTRICULAR RATE: 81 BPM
GFR AFRICAN AMERICAN: >60 ML/MIN
GFR NON-AFRICAN AMERICAN: >60 ML/MIN
GFR SERPL CREATININE-BSD FRML MDRD: ABNORMAL ML/MIN/{1.73_M2}
GFR SERPL CREATININE-BSD FRML MDRD: ABNORMAL ML/MIN/{1.73_M2}
GLUCOSE BLD-MCNC: 106 MG/DL (ref 70–99)
Lab: NORMAL
POTASSIUM SERPL-SCNC: 4.2 MMOL/L (ref 3.7–5.3)
SODIUM BLD-SCNC: 138 MMOL/L (ref 135–144)
SPECIMEN DESCRIPTION: NORMAL
STATUS: NORMAL

## 2018-06-12 PROCEDURE — 93451 RIGHT HEART CATH: CPT

## 2018-06-12 PROCEDURE — 99232 SBSQ HOSP IP/OBS MODERATE 35: CPT | Performed by: FAMILY MEDICINE

## 2018-06-12 PROCEDURE — 6360000002 HC RX W HCPCS

## 2018-06-12 PROCEDURE — 6360000002 HC RX W HCPCS: Performed by: EMERGENCY MEDICINE

## 2018-06-12 PROCEDURE — 4A023N6 MEASUREMENT OF CARDIAC SAMPLING AND PRESSURE, RIGHT HEART, PERCUTANEOUS APPROACH: ICD-10-PCS | Performed by: INTERNAL MEDICINE

## 2018-06-12 PROCEDURE — 94640 AIRWAY INHALATION TREATMENT: CPT

## 2018-06-12 PROCEDURE — C1751 CATH, INF, PER/CENT/MIDLINE: HCPCS

## 2018-06-12 PROCEDURE — 6370000000 HC RX 637 (ALT 250 FOR IP): Performed by: STUDENT IN AN ORGANIZED HEALTH CARE EDUCATION/TRAINING PROGRAM

## 2018-06-12 PROCEDURE — 6360000004 HC RX CONTRAST MEDICATION

## 2018-06-12 PROCEDURE — C1894 INTRO/SHEATH, NON-LASER: HCPCS

## 2018-06-12 PROCEDURE — 6370000000 HC RX 637 (ALT 250 FOR IP): Performed by: EMERGENCY MEDICINE

## 2018-06-12 PROCEDURE — 1200000000 HC SEMI PRIVATE

## 2018-06-12 PROCEDURE — 80048 BASIC METABOLIC PNL TOTAL CA: CPT

## 2018-06-12 PROCEDURE — 94762 N-INVAS EAR/PLS OXIMTRY CONT: CPT

## 2018-06-12 PROCEDURE — 97535 SELF CARE MNGMENT TRAINING: CPT

## 2018-06-12 PROCEDURE — C1760 CLOSURE DEV, VASC: HCPCS

## 2018-06-12 PROCEDURE — 97530 THERAPEUTIC ACTIVITIES: CPT

## 2018-06-12 PROCEDURE — 99233 SBSQ HOSP IP/OBS HIGH 50: CPT | Performed by: INTERNAL MEDICINE

## 2018-06-12 PROCEDURE — 36415 COLL VENOUS BLD VENIPUNCTURE: CPT

## 2018-06-12 PROCEDURE — 93005 ELECTROCARDIOGRAM TRACING: CPT

## 2018-06-12 RX ORDER — ACETAMINOPHEN 325 MG/1
650 TABLET ORAL EVERY 4 HOURS PRN
Status: DISCONTINUED | OUTPATIENT
Start: 2018-06-12 | End: 2018-06-14 | Stop reason: HOSPADM

## 2018-06-12 RX ORDER — SODIUM CHLORIDE 0.9 % (FLUSH) 0.9 %
10 SYRINGE (ML) INJECTION EVERY 12 HOURS SCHEDULED
Status: DISCONTINUED | OUTPATIENT
Start: 2018-06-12 | End: 2018-06-14 | Stop reason: HOSPADM

## 2018-06-12 RX ORDER — SODIUM CHLORIDE 9 MG/ML
INJECTION, SOLUTION INTRAVENOUS CONTINUOUS
Status: DISCONTINUED | OUTPATIENT
Start: 2018-06-12 | End: 2018-06-14 | Stop reason: HOSPADM

## 2018-06-12 RX ORDER — FUROSEMIDE 40 MG/1
40 TABLET ORAL DAILY
Qty: 60 TABLET | Refills: 3 | Status: ON HOLD | OUTPATIENT
Start: 2018-06-12 | End: 2019-02-26 | Stop reason: HOSPADM

## 2018-06-12 RX ORDER — SODIUM CHLORIDE 0.9 % (FLUSH) 0.9 %
10 SYRINGE (ML) INJECTION PRN
Status: DISCONTINUED | OUTPATIENT
Start: 2018-06-12 | End: 2018-06-14 | Stop reason: HOSPADM

## 2018-06-12 RX ADMIN — TIOTROPIUM BROMIDE 18 MCG: 18 CAPSULE ORAL; RESPIRATORY (INHALATION) at 09:05

## 2018-06-12 RX ADMIN — CETIRIZINE HYDROCHLORIDE 10 MG: 10 TABLET ORAL at 13:37

## 2018-06-12 RX ADMIN — ALBUTEROL SULFATE 2.5 MG: 2.5 SOLUTION RESPIRATORY (INHALATION) at 09:04

## 2018-06-12 RX ADMIN — OXYCODONE HYDROCHLORIDE 5 MG: 5 TABLET ORAL at 17:25

## 2018-06-12 RX ADMIN — DESMOPRESSIN ACETATE 40 MG: 0.2 TABLET ORAL at 22:03

## 2018-06-12 RX ADMIN — GABAPENTIN 600 MG: 600 TABLET, FILM COATED ORAL at 13:37

## 2018-06-12 RX ADMIN — OXYCODONE HYDROCHLORIDE AND ACETAMINOPHEN 1 TABLET: 5; 325 TABLET ORAL at 17:25

## 2018-06-12 RX ADMIN — FERROUS SULFATE TAB EC 325 MG (65 MG FE EQUIVALENT) 325 MG: 325 (65 FE) TABLET DELAYED RESPONSE at 17:25

## 2018-06-12 RX ADMIN — HYDRALAZINE HYDROCHLORIDE 75 MG: 50 TABLET, FILM COATED ORAL at 13:37

## 2018-06-12 RX ADMIN — MAGNESIUM GLUCONATE 500 MG ORAL TABLET 400 MG: 500 TABLET ORAL at 22:03

## 2018-06-12 RX ADMIN — ALBUTEROL SULFATE 2.5 MG: 2.5 SOLUTION RESPIRATORY (INHALATION) at 21:43

## 2018-06-12 RX ADMIN — MOMETASONE FUROATE AND FORMOTEROL FUMARATE DIHYDRATE 2 PUFF: 200; 5 AEROSOL RESPIRATORY (INHALATION) at 21:43

## 2018-06-12 RX ADMIN — GABAPENTIN 600 MG: 600 TABLET, FILM COATED ORAL at 22:03

## 2018-06-12 RX ADMIN — ALBUTEROL SULFATE 2.5 MG: 2.5 SOLUTION RESPIRATORY (INHALATION) at 17:09

## 2018-06-12 RX ADMIN — FAMOTIDINE 20 MG: 20 TABLET, FILM COATED ORAL at 13:37

## 2018-06-12 RX ADMIN — HYDROCHLOROTHIAZIDE 12.5 MG: 25 TABLET ORAL at 13:37

## 2018-06-12 RX ADMIN — SERTRALINE 100 MG: 50 TABLET, FILM COATED ORAL at 13:37

## 2018-06-12 RX ADMIN — PSEUDOEPHEDRINE HYDROCHLORIDE 120 MG: 120 TABLET, FILM COATED, EXTENDED RELEASE ORAL at 13:37

## 2018-06-12 RX ADMIN — OXYCODONE HYDROCHLORIDE 5 MG: 5 TABLET ORAL at 09:13

## 2018-06-12 RX ADMIN — PSEUDOEPHEDRINE HYDROCHLORIDE 120 MG: 120 TABLET, FILM COATED, EXTENDED RELEASE ORAL at 22:02

## 2018-06-12 RX ADMIN — MOMETASONE FUROATE AND FORMOTEROL FUMARATE DIHYDRATE 2 PUFF: 200; 5 AEROSOL RESPIRATORY (INHALATION) at 09:05

## 2018-06-12 RX ADMIN — OXYCODONE HYDROCHLORIDE AND ACETAMINOPHEN 1 TABLET: 5; 325 TABLET ORAL at 09:13

## 2018-06-12 RX ADMIN — MAGNESIUM GLUCONATE 500 MG ORAL TABLET 400 MG: 500 TABLET ORAL at 13:37

## 2018-06-12 RX ADMIN — AMLODIPINE BESYLATE 10 MG: 10 TABLET ORAL at 13:38

## 2018-06-12 RX ADMIN — ENOXAPARIN SODIUM 40 MG: 100 INJECTION SUBCUTANEOUS at 22:02

## 2018-06-12 RX ADMIN — FAMOTIDINE 20 MG: 20 TABLET, FILM COATED ORAL at 22:03

## 2018-06-12 RX ADMIN — DIAZEPAM 2.5 MG: 5 TABLET ORAL at 22:03

## 2018-06-12 ASSESSMENT — ENCOUNTER SYMPTOMS
NAUSEA: 0
SHORTNESS OF BREATH: 0
COUGH: 0
BLOOD IN STOOL: 0
VOICE CHANGE: 0
VOMITING: 0
CONSTIPATION: 0
WHEEZING: 0
ABDOMINAL PAIN: 0
SORE THROAT: 0
SINUS PRESSURE: 0
DIARRHEA: 0

## 2018-06-12 ASSESSMENT — PAIN DESCRIPTION - FREQUENCY: FREQUENCY: INTERMITTENT

## 2018-06-12 ASSESSMENT — PAIN DESCRIPTION - ORIENTATION
ORIENTATION: RIGHT
ORIENTATION: RIGHT

## 2018-06-12 ASSESSMENT — PAIN DESCRIPTION - PAIN TYPE
TYPE: ACUTE PAIN

## 2018-06-12 ASSESSMENT — PAIN DESCRIPTION - LOCATION
LOCATION: GROIN
LOCATION: GROIN
LOCATION: CHEST;GROIN

## 2018-06-12 ASSESSMENT — PAIN SCALES - GENERAL
PAINLEVEL_OUTOF10: 3
PAINLEVEL_OUTOF10: 3
PAINLEVEL_OUTOF10: 2
PAINLEVEL_OUTOF10: 6
PAINLEVEL_OUTOF10: 3
PAINLEVEL_OUTOF10: 5
PAINLEVEL_OUTOF10: 2

## 2018-06-12 ASSESSMENT — PAIN DESCRIPTION - DESCRIPTORS
DESCRIPTORS: ACHING;CONSTANT
DESCRIPTORS: ACHING

## 2018-06-13 VITALS
HEART RATE: 76 BPM | OXYGEN SATURATION: 100 % | HEIGHT: 60 IN | SYSTOLIC BLOOD PRESSURE: 127 MMHG | DIASTOLIC BLOOD PRESSURE: 67 MMHG | TEMPERATURE: 97.6 F | WEIGHT: 293 LBS | BODY MASS INDEX: 57.52 KG/M2 | RESPIRATION RATE: 14 BRPM

## 2018-06-13 LAB
ANION GAP SERPL CALCULATED.3IONS-SCNC: 11 MMOL/L (ref 9–17)
BUN BLDV-MCNC: 25 MG/DL (ref 6–20)
BUN/CREAT BLD: ABNORMAL (ref 9–20)
CALCIUM SERPL-MCNC: 8.7 MG/DL (ref 8.6–10.4)
CHLORIDE BLD-SCNC: 94 MMOL/L (ref 98–107)
CO2: 29 MMOL/L (ref 20–31)
CREAT SERPL-MCNC: 0.69 MG/DL (ref 0.5–0.9)
EKG ATRIAL RATE: 72 BPM
EKG P AXIS: 32 DEGREES
EKG P-R INTERVAL: 162 MS
EKG Q-T INTERVAL: 426 MS
EKG QRS DURATION: 96 MS
EKG QTC CALCULATION (BAZETT): 466 MS
EKG R AXIS: 16 DEGREES
EKG T AXIS: 14 DEGREES
EKG VENTRICULAR RATE: 72 BPM
GFR AFRICAN AMERICAN: >60 ML/MIN
GFR NON-AFRICAN AMERICAN: >60 ML/MIN
GFR SERPL CREATININE-BSD FRML MDRD: ABNORMAL ML/MIN/{1.73_M2}
GFR SERPL CREATININE-BSD FRML MDRD: ABNORMAL ML/MIN/{1.73_M2}
GLUCOSE BLD-MCNC: 111 MG/DL (ref 70–99)
POTASSIUM SERPL-SCNC: 4 MMOL/L (ref 3.7–5.3)
SODIUM BLD-SCNC: 134 MMOL/L (ref 135–144)

## 2018-06-13 PROCEDURE — 94762 N-INVAS EAR/PLS OXIMTRY CONT: CPT

## 2018-06-13 PROCEDURE — 97530 THERAPEUTIC ACTIVITIES: CPT

## 2018-06-13 PROCEDURE — 80048 BASIC METABOLIC PNL TOTAL CA: CPT

## 2018-06-13 PROCEDURE — 6360000002 HC RX W HCPCS: Performed by: EMERGENCY MEDICINE

## 2018-06-13 PROCEDURE — 93005 ELECTROCARDIOGRAM TRACING: CPT

## 2018-06-13 PROCEDURE — 99233 SBSQ HOSP IP/OBS HIGH 50: CPT | Performed by: INTERNAL MEDICINE

## 2018-06-13 PROCEDURE — 6370000000 HC RX 637 (ALT 250 FOR IP): Performed by: EMERGENCY MEDICINE

## 2018-06-13 PROCEDURE — 6370000000 HC RX 637 (ALT 250 FOR IP): Performed by: STUDENT IN AN ORGANIZED HEALTH CARE EDUCATION/TRAINING PROGRAM

## 2018-06-13 PROCEDURE — 94660 CPAP INITIATION&MGMT: CPT

## 2018-06-13 PROCEDURE — 99239 HOSP IP/OBS DSCHRG MGMT >30: CPT | Performed by: FAMILY MEDICINE

## 2018-06-13 PROCEDURE — 36415 COLL VENOUS BLD VENIPUNCTURE: CPT

## 2018-06-13 PROCEDURE — 94640 AIRWAY INHALATION TREATMENT: CPT

## 2018-06-13 PROCEDURE — 1200000000 HC SEMI PRIVATE

## 2018-06-13 RX ADMIN — OXYCODONE HYDROCHLORIDE AND ACETAMINOPHEN 1 TABLET: 5; 325 TABLET ORAL at 01:05

## 2018-06-13 RX ADMIN — TIOTROPIUM BROMIDE 18 MCG: 18 CAPSULE ORAL; RESPIRATORY (INHALATION) at 09:21

## 2018-06-13 RX ADMIN — HYDROCHLOROTHIAZIDE 12.5 MG: 25 TABLET ORAL at 09:16

## 2018-06-13 RX ADMIN — OXYCODONE HYDROCHLORIDE 5 MG: 5 TABLET ORAL at 17:23

## 2018-06-13 RX ADMIN — CETIRIZINE HYDROCHLORIDE 10 MG: 10 TABLET ORAL at 09:15

## 2018-06-13 RX ADMIN — GABAPENTIN 600 MG: 600 TABLET, FILM COATED ORAL at 09:15

## 2018-06-13 RX ADMIN — FERROUS SULFATE TAB EC 325 MG (65 MG FE EQUIVALENT) 325 MG: 325 (65 FE) TABLET DELAYED RESPONSE at 09:15

## 2018-06-13 RX ADMIN — FERROUS SULFATE TAB EC 325 MG (65 MG FE EQUIVALENT) 325 MG: 325 (65 FE) TABLET DELAYED RESPONSE at 17:24

## 2018-06-13 RX ADMIN — OXYCODONE HYDROCHLORIDE AND ACETAMINOPHEN 1 TABLET: 5; 325 TABLET ORAL at 09:14

## 2018-06-13 RX ADMIN — SERTRALINE 100 MG: 50 TABLET, FILM COATED ORAL at 09:14

## 2018-06-13 RX ADMIN — OXYCODONE HYDROCHLORIDE 5 MG: 5 TABLET ORAL at 09:14

## 2018-06-13 RX ADMIN — ENOXAPARIN SODIUM 40 MG: 100 INJECTION SUBCUTANEOUS at 09:14

## 2018-06-13 RX ADMIN — GABAPENTIN 600 MG: 600 TABLET, FILM COATED ORAL at 17:24

## 2018-06-13 RX ADMIN — OXYCODONE HYDROCHLORIDE 5 MG: 5 TABLET ORAL at 01:05

## 2018-06-13 RX ADMIN — MOMETASONE FUROATE AND FORMOTEROL FUMARATE DIHYDRATE 2 PUFF: 200; 5 AEROSOL RESPIRATORY (INHALATION) at 09:22

## 2018-06-13 RX ADMIN — HYDRALAZINE HYDROCHLORIDE 75 MG: 50 TABLET, FILM COATED ORAL at 09:15

## 2018-06-13 RX ADMIN — ALBUTEROL SULFATE 2.5 MG: 2.5 SOLUTION RESPIRATORY (INHALATION) at 09:22

## 2018-06-13 RX ADMIN — FAMOTIDINE 20 MG: 20 TABLET, FILM COATED ORAL at 09:15

## 2018-06-13 RX ADMIN — OXYCODONE HYDROCHLORIDE AND ACETAMINOPHEN 1 TABLET: 5; 325 TABLET ORAL at 17:23

## 2018-06-13 RX ADMIN — ALBUTEROL SULFATE 2.5 MG: 2.5 SOLUTION RESPIRATORY (INHALATION) at 16:44

## 2018-06-13 RX ADMIN — MAGNESIUM GLUCONATE 500 MG ORAL TABLET 400 MG: 500 TABLET ORAL at 09:15

## 2018-06-13 RX ADMIN — PSEUDOEPHEDRINE HYDROCHLORIDE 120 MG: 120 TABLET, FILM COATED, EXTENDED RELEASE ORAL at 09:14

## 2018-06-13 RX ADMIN — AMLODIPINE BESYLATE 10 MG: 10 TABLET ORAL at 09:15

## 2018-06-13 ASSESSMENT — PAIN SCALES - GENERAL
PAINLEVEL_OUTOF10: 3
PAINLEVEL_OUTOF10: 4
PAINLEVEL_OUTOF10: 6
PAINLEVEL_OUTOF10: 6
PAINLEVEL_OUTOF10: 3
PAINLEVEL_OUTOF10: 5
PAINLEVEL_OUTOF10: 6

## 2018-06-13 ASSESSMENT — PAIN DESCRIPTION - LOCATION
LOCATION: CHEST
LOCATION: ARM;WRIST

## 2018-06-13 ASSESSMENT — PAIN DESCRIPTION - PAIN TYPE
TYPE: ACUTE PAIN
TYPE: ACUTE PAIN

## 2018-06-13 ASSESSMENT — PAIN DESCRIPTION - ORIENTATION
ORIENTATION: RIGHT
ORIENTATION: RIGHT

## 2018-06-13 ASSESSMENT — PAIN DESCRIPTION - ONSET: ONSET: ON-GOING

## 2018-06-13 ASSESSMENT — PAIN DESCRIPTION - DESCRIPTORS: DESCRIPTORS: ACHING

## 2018-06-13 ASSESSMENT — PAIN DESCRIPTION - PROGRESSION: CLINICAL_PROGRESSION: NOT CHANGED

## 2018-06-13 ASSESSMENT — PAIN DESCRIPTION - FREQUENCY: FREQUENCY: CONTINUOUS

## 2018-09-15 ENCOUNTER — APPOINTMENT (OUTPATIENT)
Dept: GENERAL RADIOLOGY | Age: 43
DRG: 194 | End: 2018-09-15
Payer: MEDICARE

## 2018-09-15 ENCOUNTER — HOSPITAL ENCOUNTER (INPATIENT)
Age: 43
LOS: 6 days | Discharge: HOME HEALTH | DRG: 194 | End: 2018-09-21
Attending: EMERGENCY MEDICINE | Admitting: INTERNAL MEDICINE
Payer: MEDICARE

## 2018-09-15 DIAGNOSIS — E66.2 OBESITY HYPOVENTILATION SYNDROME (HCC): ICD-10-CM

## 2018-09-15 DIAGNOSIS — I50.9 ACUTE ON CHRONIC CONGESTIVE HEART FAILURE, UNSPECIFIED HEART FAILURE TYPE (HCC): Primary | ICD-10-CM

## 2018-09-15 PROBLEM — I50.33 ACUTE ON CHRONIC DIASTOLIC CHF (CONGESTIVE HEART FAILURE) (HCC): Status: ACTIVE | Noted: 2018-09-15

## 2018-09-15 LAB
ABSOLUTE EOS #: 0.19 K/UL (ref 0–0.44)
ABSOLUTE IMMATURE GRANULOCYTE: 0.06 K/UL (ref 0–0.3)
ABSOLUTE LYMPH #: 1.79 K/UL (ref 1.1–3.7)
ABSOLUTE MONO #: 0.76 K/UL (ref 0.1–1.2)
ANION GAP SERPL CALCULATED.3IONS-SCNC: 11 MMOL/L (ref 9–17)
BASOPHILS # BLD: 0 % (ref 0–2)
BASOPHILS ABSOLUTE: <0.03 K/UL (ref 0–0.2)
BNP INTERPRETATION: NORMAL
BUN BLDV-MCNC: 22 MG/DL (ref 6–20)
BUN/CREAT BLD: ABNORMAL (ref 9–20)
CALCIUM SERPL-MCNC: 8.8 MG/DL (ref 8.6–10.4)
CHLORIDE BLD-SCNC: 100 MMOL/L (ref 98–107)
CO2: 31 MMOL/L (ref 20–31)
CREAT SERPL-MCNC: 0.82 MG/DL (ref 0.5–0.9)
DIFFERENTIAL TYPE: ABNORMAL
EKG ATRIAL RATE: 74 BPM
EKG P AXIS: 55 DEGREES
EKG P-R INTERVAL: 152 MS
EKG Q-T INTERVAL: 432 MS
EKG QRS DURATION: 100 MS
EKG QTC CALCULATION (BAZETT): 479 MS
EKG R AXIS: 9 DEGREES
EKG T AXIS: 10 DEGREES
EKG VENTRICULAR RATE: 74 BPM
EOSINOPHILS RELATIVE PERCENT: 2 % (ref 1–4)
GFR AFRICAN AMERICAN: >60 ML/MIN
GFR NON-AFRICAN AMERICAN: >60 ML/MIN
GFR SERPL CREATININE-BSD FRML MDRD: ABNORMAL ML/MIN/{1.73_M2}
GFR SERPL CREATININE-BSD FRML MDRD: ABNORMAL ML/MIN/{1.73_M2}
GLUCOSE BLD-MCNC: 122 MG/DL (ref 70–99)
HCT VFR BLD CALC: 27.7 % (ref 36.3–47.1)
HEMOGLOBIN: 8 G/DL (ref 11.9–15.1)
IMMATURE GRANULOCYTES: 1 %
LYMPHOCYTES # BLD: 21 % (ref 24–43)
MCH RBC QN AUTO: 27 PG (ref 25.2–33.5)
MCHC RBC AUTO-ENTMCNC: 28.9 G/DL (ref 28.4–34.8)
MCV RBC AUTO: 93.6 FL (ref 82.6–102.9)
MONOCYTES # BLD: 9 % (ref 3–12)
NRBC AUTOMATED: 0 PER 100 WBC
PDW BLD-RTO: 13.1 % (ref 11.8–14.4)
PLATELET # BLD: 252 K/UL (ref 138–453)
PLATELET ESTIMATE: ABNORMAL
PMV BLD AUTO: 10.1 FL (ref 8.1–13.5)
POC TROPONIN I: 0 NG/ML (ref 0–0.1)
POC TROPONIN I: 0.01 NG/ML (ref 0–0.1)
POC TROPONIN INTERP: NORMAL
POC TROPONIN INTERP: NORMAL
POTASSIUM SERPL-SCNC: 4 MMOL/L (ref 3.7–5.3)
PRO-BNP: 84 PG/ML
RBC # BLD: 2.96 M/UL (ref 3.95–5.11)
RBC # BLD: ABNORMAL 10*6/UL
SEG NEUTROPHILS: 67 % (ref 36–65)
SEGMENTED NEUTROPHILS ABSOLUTE COUNT: 5.81 K/UL (ref 1.5–8.1)
SODIUM BLD-SCNC: 142 MMOL/L (ref 135–144)
TROPONIN INTERP: NORMAL
TROPONIN T: <0.03 NG/ML
WBC # BLD: 8.6 K/UL (ref 3.5–11.3)
WBC # BLD: ABNORMAL 10*3/UL

## 2018-09-15 PROCEDURE — 6360000002 HC RX W HCPCS: Performed by: STUDENT IN AN ORGANIZED HEALTH CARE EDUCATION/TRAINING PROGRAM

## 2018-09-15 PROCEDURE — 84484 ASSAY OF TROPONIN QUANT: CPT

## 2018-09-15 PROCEDURE — 71045 X-RAY EXAM CHEST 1 VIEW: CPT

## 2018-09-15 PROCEDURE — 83880 ASSAY OF NATRIURETIC PEPTIDE: CPT

## 2018-09-15 PROCEDURE — 93005 ELECTROCARDIOGRAM TRACING: CPT

## 2018-09-15 PROCEDURE — 99285 EMERGENCY DEPT VISIT HI MDM: CPT

## 2018-09-15 PROCEDURE — 2060000000 HC ICU INTERMEDIATE R&B

## 2018-09-15 PROCEDURE — 85025 COMPLETE CBC W/AUTO DIFF WBC: CPT

## 2018-09-15 PROCEDURE — 36415 COLL VENOUS BLD VENIPUNCTURE: CPT

## 2018-09-15 PROCEDURE — 6370000000 HC RX 637 (ALT 250 FOR IP): Performed by: STUDENT IN AN ORGANIZED HEALTH CARE EDUCATION/TRAINING PROGRAM

## 2018-09-15 PROCEDURE — 94660 CPAP INITIATION&MGMT: CPT

## 2018-09-15 PROCEDURE — 2580000003 HC RX 258: Performed by: STUDENT IN AN ORGANIZED HEALTH CARE EDUCATION/TRAINING PROGRAM

## 2018-09-15 PROCEDURE — 80048 BASIC METABOLIC PNL TOTAL CA: CPT

## 2018-09-15 RX ORDER — ACETAMINOPHEN 325 MG/1
650 TABLET ORAL EVERY 4 HOURS PRN
Status: DISCONTINUED | OUTPATIENT
Start: 2018-09-15 | End: 2018-09-21 | Stop reason: HOSPADM

## 2018-09-15 RX ORDER — ALBUTEROL SULFATE 2.5 MG/3ML
2.5 SOLUTION RESPIRATORY (INHALATION)
Status: DISCONTINUED | OUTPATIENT
Start: 2018-09-15 | End: 2018-09-21 | Stop reason: HOSPADM

## 2018-09-15 RX ORDER — NICOTINE 21 MG/24HR
1 PATCH, TRANSDERMAL 24 HOURS TRANSDERMAL EVERY 24 HOURS
Status: DISCONTINUED | OUTPATIENT
Start: 2018-09-15 | End: 2018-09-16

## 2018-09-15 RX ORDER — LISINOPRIL 20 MG/1
20 TABLET ORAL DAILY
Status: DISCONTINUED | OUTPATIENT
Start: 2018-09-16 | End: 2018-09-21

## 2018-09-15 RX ORDER — LANOLIN ALCOHOL/MO/W.PET/CERES
325 CREAM (GRAM) TOPICAL 2 TIMES DAILY WITH MEALS
Status: DISCONTINUED | OUTPATIENT
Start: 2018-09-16 | End: 2018-09-21 | Stop reason: HOSPADM

## 2018-09-15 RX ORDER — CETIRIZINE HYDROCHLORIDE 10 MG/1
10 TABLET ORAL DAILY
Status: DISCONTINUED | OUTPATIENT
Start: 2018-09-15 | End: 2018-09-21 | Stop reason: HOSPADM

## 2018-09-15 RX ORDER — SODIUM CHLORIDE 0.9 % (FLUSH) 0.9 %
10 SYRINGE (ML) INJECTION PRN
Status: DISCONTINUED | OUTPATIENT
Start: 2018-09-15 | End: 2018-09-15 | Stop reason: SDUPTHER

## 2018-09-15 RX ORDER — SODIUM CHLORIDE 0.9 % (FLUSH) 0.9 %
10 SYRINGE (ML) INJECTION EVERY 12 HOURS SCHEDULED
Status: DISCONTINUED | OUTPATIENT
Start: 2018-09-15 | End: 2018-09-15 | Stop reason: SDUPTHER

## 2018-09-15 RX ORDER — ALBUTEROL SULFATE 90 UG/1
2 AEROSOL, METERED RESPIRATORY (INHALATION) EVERY 6 HOURS PRN
Status: DISCONTINUED | OUTPATIENT
Start: 2018-09-15 | End: 2018-09-15

## 2018-09-15 RX ORDER — IPRATROPIUM BROMIDE AND ALBUTEROL SULFATE 2.5; .5 MG/3ML; MG/3ML
3 SOLUTION RESPIRATORY (INHALATION) 4 TIMES DAILY
Status: DISCONTINUED | OUTPATIENT
Start: 2018-09-15 | End: 2018-09-15

## 2018-09-15 RX ORDER — GABAPENTIN 800 MG/1
800 TABLET ORAL 3 TIMES DAILY
Status: DISCONTINUED | OUTPATIENT
Start: 2018-09-15 | End: 2018-09-21 | Stop reason: HOSPADM

## 2018-09-15 RX ORDER — CLONIDINE HYDROCHLORIDE 0.1 MG/1
0.1 TABLET ORAL 3 TIMES DAILY PRN
Status: DISCONTINUED | OUTPATIENT
Start: 2018-09-15 | End: 2018-09-21 | Stop reason: HOSPADM

## 2018-09-15 RX ORDER — SODIUM CHLORIDE 0.9 % (FLUSH) 0.9 %
10 SYRINGE (ML) INJECTION PRN
Status: DISCONTINUED | OUTPATIENT
Start: 2018-09-15 | End: 2018-09-21 | Stop reason: HOSPADM

## 2018-09-15 RX ORDER — FLUTICASONE PROPIONATE 50 MCG
1 SPRAY, SUSPENSION (ML) NASAL DAILY
Status: DISCONTINUED | OUTPATIENT
Start: 2018-09-16 | End: 2018-09-21 | Stop reason: HOSPADM

## 2018-09-15 RX ORDER — AMLODIPINE BESYLATE 10 MG/1
10 TABLET ORAL DAILY
Status: DISCONTINUED | OUTPATIENT
Start: 2018-09-16 | End: 2018-09-21 | Stop reason: HOSPADM

## 2018-09-15 RX ORDER — OXYCODONE HYDROCHLORIDE AND ACETAMINOPHEN 5; 325 MG/1; MG/1
1 TABLET ORAL EVERY 4 HOURS PRN
Status: DISCONTINUED | OUTPATIENT
Start: 2018-09-15 | End: 2018-09-21 | Stop reason: HOSPADM

## 2018-09-15 RX ORDER — GABAPENTIN 800 MG/1
800 TABLET ORAL 3 TIMES DAILY
Status: ON HOLD | COMMUNITY
End: 2018-11-16

## 2018-09-15 RX ORDER — ATORVASTATIN CALCIUM 40 MG/1
40 TABLET, FILM COATED ORAL NIGHTLY
Status: DISCONTINUED | OUTPATIENT
Start: 2018-09-15 | End: 2018-09-21 | Stop reason: HOSPADM

## 2018-09-15 RX ORDER — SODIUM CHLORIDE 0.9 % (FLUSH) 0.9 %
10 SYRINGE (ML) INJECTION EVERY 12 HOURS SCHEDULED
Status: DISCONTINUED | OUTPATIENT
Start: 2018-09-15 | End: 2018-09-21 | Stop reason: HOSPADM

## 2018-09-15 RX ORDER — ONDANSETRON 2 MG/ML
4 INJECTION INTRAMUSCULAR; INTRAVENOUS EVERY 6 HOURS PRN
Status: DISCONTINUED | OUTPATIENT
Start: 2018-09-15 | End: 2018-09-21 | Stop reason: HOSPADM

## 2018-09-15 RX ORDER — FAMOTIDINE 20 MG/1
20 TABLET, FILM COATED ORAL 2 TIMES DAILY
Status: DISCONTINUED | OUTPATIENT
Start: 2018-09-15 | End: 2018-09-21 | Stop reason: HOSPADM

## 2018-09-15 RX ADMIN — FUROSEMIDE 5 MG/HR: 10 INJECTION, SOLUTION INTRAMUSCULAR; INTRAVENOUS at 18:00

## 2018-09-15 RX ADMIN — FAMOTIDINE 20 MG: 20 TABLET, FILM COATED ORAL at 22:34

## 2018-09-15 RX ADMIN — OXYCODONE HYDROCHLORIDE AND ACETAMINOPHEN 1 TABLET: 5; 325 TABLET ORAL at 22:35

## 2018-09-15 RX ADMIN — CETIRIZINE HYDROCHLORIDE 10 MG: 10 TABLET ORAL at 22:34

## 2018-09-15 RX ADMIN — GABAPENTIN 800 MG: 800 TABLET ORAL at 22:34

## 2018-09-15 RX ADMIN — DESMOPRESSIN ACETATE 40 MG: 0.2 TABLET ORAL at 22:34

## 2018-09-15 RX ADMIN — HYDRALAZINE HYDROCHLORIDE 75 MG: 50 TABLET, FILM COATED ORAL at 22:34

## 2018-09-15 ASSESSMENT — ENCOUNTER SYMPTOMS
BACK PAIN: 0
COUGH: 0
EYE PAIN: 0
SHORTNESS OF BREATH: 1
PHOTOPHOBIA: 0
SORE THROAT: 0
COUGH: 1
SPUTUM PRODUCTION: 0
CHEST TIGHTNESS: 1
CONSTIPATION: 0
BLURRED VISION: 0
ORTHOPNEA: 1
NAUSEA: 0
HEARTBURN: 0
WHEEZING: 0
VOMITING: 0
BLOOD IN STOOL: 0
HEMOPTYSIS: 0
ABDOMINAL PAIN: 0
DIARRHEA: 0

## 2018-09-15 ASSESSMENT — PAIN DESCRIPTION - PAIN TYPE: TYPE: CHRONIC PAIN

## 2018-09-15 ASSESSMENT — PAIN SCALES - GENERAL
PAINLEVEL_OUTOF10: 7
PAINLEVEL_OUTOF10: 7

## 2018-09-15 ASSESSMENT — PAIN DESCRIPTION - LOCATION: LOCATION: WRIST;HAND;KNEE

## 2018-09-15 ASSESSMENT — PAIN DESCRIPTION - ONSET: ONSET: ON-GOING

## 2018-09-15 ASSESSMENT — PAIN DESCRIPTION - ORIENTATION: ORIENTATION: RIGHT

## 2018-09-15 ASSESSMENT — PAIN DESCRIPTION - FREQUENCY: FREQUENCY: INTERMITTENT

## 2018-09-15 NOTE — ED PROVIDER NOTES
[facility]     Emergency Department     Faculty Attestation    I performed a history and physical examination of the patient and discussed management with the resident. I reviewed the residents note and agree with the documented findings and plan of care. Any areas of disagreement are noted on the chart. I was personally present for the key portions of any procedures. I have documented in the chart those procedures where I was not present during the key portions. I have reviewed the emergency nurses triage note. I agree with the chief complaint, past medical history, past surgical history, allergies, medications, social and family history as documented unless otherwise noted below. Documentation of the HPI, Physical Exam and Medical Decision Making performed by medical students or scribes is based on my personal performance of the HPI, PE and MDM. For Physician Assistant/ Nurse Practitioner cases/documentation I have personally evaluated this patient and have completed at least one if not all key elements of the E/M (history, physical exam, and MDM). Additional findings are as noted.     Vital signs:   Vitals:    09/15/18 1653   BP: (!) 156/62   Pulse: 87   Resp: 18   SpO2: 97%          EKG Interpretation    Interpreted by emergency department physician    Rhythm: normal sinus   Rate: normal  Axis: normal  Ectopy: none  Conduction: normal  ST Segments: flattening in  II and III  T Waves: flattening in  II and III  Q Waves: none    Clinical Impression: non-specific EKG    Jane Wolfe M.D,  Attending Emergency  Physician            Slick Jackson MD  09/15/18 3538

## 2018-09-15 NOTE — ED PROVIDER NOTES
[facility] ED  Emergency Department Encounter  Emergency Medicine Resident     Pt Name: Soha Price  MRN: [id number]  [place] 1975  Date of evaluation: 9/15/18  PCP:  Dominic       Chief Complaint   Patient presents with    Congestive Heart Failure     SOB. pulmonary hypertension. was on lasix switched to diff diuretic and now SOB weight gain       HISTORY OF PRESENT ILLNESS  (Location/Symptom, Timing/Onset, Context/Setting, Quality, Duration, Modifying Factors, Severity.)      Soha Price is a 37 y.o. female who presents with Shortness of breath. Patient states that she has a history of CHF, COPD, pulmonary hypertension and that for the past 2 weeks she has had increased shortness of breath and has put on about 40 pounds of extra water weight. She notes that she sees Dr. Ameya Bello and that she was maxed out on Lasix so they switched her to Bumex and have been titrating up and that she is currently taking 3 mg of Bumex per day. Patient denies any chest pain, fevers, or diaphoresis. PAST MEDICAL / SURGICAL / SOCIAL / FAMILY HISTORY      has a past medical history of Acute on chronic diastolic CHF (congestive heart failure) ([facility]) and COPD (chronic obstructive pulmonary disease) ([address] has a past surgical history that includes hc cath power picc triple (2/2/2018) and pr office/outpt visit,procedure only (N/A, 2/17/2018). Social History     Social History    Marital status: Single     Spouse name: N/A    Number of children: N/A    Years of education: N/A     Occupational History    Not on file. Social History Main Topics    Smoking status: Heavy Tobacco Smoker     Packs/day: 0.50    Smokeless tobacco: Never Used    Alcohol use Not on file    Drug use: No    Sexual activity: Not on file     Other Topics Concern    Not on file     Social History Narrative    No narrative on file       No family history on file.      Allergies:  Elnita Washington [aspirin]; Beta adrenergic blockers; and Sulfa antibiotics    Home Medications:  Prior to Admission medications    Medication Sig Start Date End Date Taking?  Authorizing Provider   ipratropium-albuterol (DUONEB) 0.5-2.5 (3) MG/3ML SOLN nebulizer solution Inhale 3 mLs into the lungs 4 times daily 2/21/18   Romeo Gonzalez MD   atorvastatin (LIPITOR) 40 MG tablet Take 1 tablet by mouth nightly 2/21/18   Romeo Gonzalez MD   cloNIDine (CATAPRES) 0.1 MG tablet Take 1 tablet by mouth 3 times daily as needed (SBP>160) 2/21/18   Romeo Gonzalez MD   hydrALAZINE (APRESOLINE) 25 MG tablet Take 3 tablets by mouth every 8 hours 2/21/18   Romeo Gonzalez MD   Glycopyrrolate 0.4 MG/2ML SOLN injection Infuse 0.5 mLs intravenously 3 times daily 2/21/18   Romeo Gonzalez MD   famotidine (PEPCID) 20 MG tablet Take 1 tablet by mouth 2 times daily 2/21/18   Romeo Gonzalez MD   nicotine (NICODERM CQ) 21 MG/24HR Place 1 patch onto the skin every 24 hours    Historical Provider, MD   doxycycline hyclate (VIBRA-TABS) 100 MG tablet Take 100 mg by mouth 2 times daily 2 times daily for 10 days    Historical Provider, MD   furosemide (LASIX) 20 MG tablet Take 20 mg by mouth daily    Historical Provider, MD   tiotropium (SPIRIVA) 18 MCG inhalation capsule Inhale 18 mcg into the lungs daily    Historical Provider, MD   albuterol sulfate  (90 Base) MCG/ACT inhaler Inhale 2 puffs into the lungs every 6 hours as needed for Wheezing    Historical Provider, MD   ferrous sulfate 325 (65 Fe) MG tablet Take 325 mg by mouth 2 times daily (with meals)     Historical Provider, MD   amLODIPine (NORVASC) 10 MG tablet Take 10 mg by mouth daily    Historical Provider, MD   lisinopril (PRINIVIL;ZESTRIL) 20 MG tablet Take 20 mg by mouth daily    Historical Provider, MD   loratadine (CLARITIN) 10 MG capsule Take 10 mg by mouth daily    Historical Provider, MD   Fluticasone-Salmeterol (ADVAIR DISKUS IN) Inhale into the lungs    Historical Provider, MD   fluticasone (FLONASE) 50 MCG/ACT nasal spray 1 spray by Nasal route daily    Historical Provider, MD       REVIEW OF SYSTEMS    (2-9 systems for level 4, 10 or more for level 5)      Review of Systems   Constitutional: Negative for chills and fever. HENT: Negative. Respiratory: Positive for chest tightness and shortness of breath. Negative for cough and wheezing. Cardiovascular: Positive for leg swelling. Negative for chest pain and palpitations. Gastrointestinal: Negative for abdominal pain, nausea and vomiting. Endocrine: Negative for polyuria. Genitourinary: Negative for dysuria and hematuria. Musculoskeletal: Negative for back pain. Skin: Negative for rash and wound. Neurological: Negative for weakness, light-headedness and numbness. PHYSICAL EXAM   (up to 7 for level 4, 8 or more for level 5)      INITIAL VITALS:   ED Triage Vitals [09/15/18 1610]   BP Temp Temp src Pulse Resp SpO2 Height Weight   -- -- -- -- 22 -- -- --       Physical Exam   Constitutional: She is oriented to person, place, and time. She appears well-developed and well-nourished. No distress. Morbidly obese. HENT:   Head: Normocephalic and atraumatic. Eyes: Pupils are equal, round, and reactive to light. EOM are normal.   Neck: No JVD present. Cardiovascular: Normal rate, regular rhythm and normal heart sounds. Exam reveals no gallop and no friction rub. No murmur heard. Pulmonary/Chest: Effort normal. No respiratory distress. She has no wheezes. She has no rales. Decreased air movement diffusely. Abdominal: Soft. She exhibits no distension. There is no tenderness. There is no guarding. Musculoskeletal: She exhibits edema. She exhibits no tenderness. Pitting edema to the lower extremities as well as bilateral hands. Neurological: She is alert and oriented to person, place, and time. Skin: No rash noted. Nursing note and vitals reviewed.       DIFFERENTIAL  DIAGNOSIS     PLAN (LABS / LABS:  Labs Reviewed   CBC WITH AUTO DIFFERENTIAL - Abnormal; Notable for the following:        Result Value    RBC 2.96 (*)     Hemoglobin 8.0 (*)     Hematocrit 27.7 (*)     Seg Neutrophils 67 (*)     Lymphocytes 21 (*)     Immature Granulocytes 1 (*)     All other components within normal limits   BASIC METABOLIC PANEL - Abnormal; Notable for the following:     Glucose 122 (*)     BUN 22 (*)     All other components within normal limits   POCT TROPONIN   POCT TROPONIN   POCT TROPONIN         RADIOLOGY:  Xr Chest Portable    Result Date: 9/15/2018  EXAMINATION: SINGLE XRAY VIEW OF THE CHEST 9/15/2018 4:55 pm COMPARISON: February 19, 2018 and February 17, 2018 HISTORY: ORDERING SYSTEM PROVIDED HISTORY: sob TECHNOLOGIST PROVIDED HISTORY: sob FINDINGS: Diminished penetration through the soft tissue is noted. Cardiac silhouette enlargement is again noted. The tracheostomy tube is no longer present. No focal area of consolidation is identified. No evidence for pneumothorax or convincing evidence for interstitial edema. No effusion identified. The osseous structures appear unchanged. Diminished penetration through soft tissue without convincing evidence for acute airspace disease. Cardiac silhouette enlargement is again noted. EKG  Rhythm: normal sinus   Rate: normal  Axis: normal  Ectopy: none  Conduction: normal  ST Segments: flattening in  II and III  T Waves: flattening in  II and III  Q Waves: none     Clinical Impression: non-specific EKG    All EKG's are interpreted by the Emergency Department Physician who either signs or Co-signs this chart in the absence of a cardiologist.      PROCEDURES:  None    CONSULTS:  IP CONSULT TO CARDIOLOGY  IP CONSULT TO INTERNAL MEDICINE    CRITICAL CARE:  Please see attending note    FINAL IMPRESSION      1.  Acute on chronic congestive heart failure, unspecified heart failure type ([facility] [address]          DISPOSITION / PLAN     DISPOSITION Admitted 09/15/2018 05:57:04

## 2018-09-15 NOTE — ED NOTES
Bed: 30  Expected date:   Expected time:   Means of arrival:   Comments:  Carson Melton RN  09/15/18 6624

## 2018-09-15 NOTE — PROGRESS NOTES
Internal Medicine Senior Note    36 yo female with history of chronic respiratory failure 2/2 COPD, CHF, Obesity hypoventilation syndrome presented to the ED with progressively worsening SOB, weight gain of 40 lbs for last 3-6 weeks. Associated with cough with frothy sputum. H/o orthopnea and increasing the elevation of head end of the bed. Patient reported that cardiology switched her to bumex 3 mg daily as they maxed out on lasix. Stated that she is having lightheadedness since 3 weeks as a side effect of starting bumex. Patient denied other complaints. Started on lasix drip as per cardiology recommendations. Patient admitted to ICU in feb 2018 for COPD exacerbation 2/2 rhinovirus, ARDS, unable to wean patient off ventilator, trach and peg was done. Due to asystole and bradycardia, cardiology advised patient to be off beta blockers. Patient initially on BIPP on arrival, later on home oxygen, without respiratory distress, patient hemodynamically stable, afebrile, troponin negative, BNP 84, hemoglobin 8. Reported that she quit smoking 8 months ago. Echo in 1/2018 showed EF 65%    Principal Problem:    Acute on chronic diastolic CHF (congestive heart failure) (HCC)  Active Problems:    Smoker    Morbid obesity (HCC)    Obesity hypoventilation syndrome (HCC)    SOB (shortness of breath)    COPD (chronic obstructive pulmonary disease) (HCC)    Status post tracheostomy ([address]    Chronic respiratory failure (HCC)  Resolved Problems:    * No resolved hospital problems. *    Plan  Patient started on lasix drip as per cardiology in ED  Monitor intake and output, daily weights  Low sodium diet.  Fluid restriction  Cardiology consult  Resume home medications - norvasc 10 mg, lisinopril 20 and hydralazine 75 every 8 hrs for HTN  Resume spiriva and dulera  Consult CHF nurse  BIPAP as needed

## 2018-09-16 PROBLEM — J96.10 CHRONIC RESPIRATORY FAILURE (HCC): Status: ACTIVE | Noted: 2018-09-16

## 2018-09-16 LAB
ABSOLUTE EOS #: 0.19 K/UL (ref 0–0.44)
ABSOLUTE IMMATURE GRANULOCYTE: 0.03 K/UL (ref 0–0.3)
ABSOLUTE LYMPH #: 1.71 K/UL (ref 1.1–3.7)
ABSOLUTE MONO #: 0.55 K/UL (ref 0.1–1.2)
ANION GAP SERPL CALCULATED.3IONS-SCNC: 14 MMOL/L (ref 9–17)
BASOPHILS # BLD: 0 % (ref 0–2)
BASOPHILS ABSOLUTE: <0.03 K/UL (ref 0–0.2)
BUN BLDV-MCNC: 17 MG/DL (ref 6–20)
BUN/CREAT BLD: ABNORMAL (ref 9–20)
CALCIUM SERPL-MCNC: 8.6 MG/DL (ref 8.6–10.4)
CHLORIDE BLD-SCNC: 97 MMOL/L (ref 98–107)
CO2: 29 MMOL/L (ref 20–31)
CREAT SERPL-MCNC: 0.73 MG/DL (ref 0.5–0.9)
DIFFERENTIAL TYPE: ABNORMAL
EOSINOPHILS RELATIVE PERCENT: 3 % (ref 1–4)
GFR AFRICAN AMERICAN: >60 ML/MIN
GFR NON-AFRICAN AMERICAN: >60 ML/MIN
GFR SERPL CREATININE-BSD FRML MDRD: ABNORMAL ML/MIN/{1.73_M2}
GFR SERPL CREATININE-BSD FRML MDRD: ABNORMAL ML/MIN/{1.73_M2}
GLUCOSE BLD-MCNC: 97 MG/DL (ref 70–99)
HCT VFR BLD CALC: 31.5 % (ref 36.3–47.1)
HEMOGLOBIN: 9 G/DL (ref 11.9–15.1)
IMMATURE GRANULOCYTES: 1 %
LYMPHOCYTES # BLD: 28 % (ref 24–43)
MCH RBC QN AUTO: 26.9 PG (ref 25.2–33.5)
MCHC RBC AUTO-ENTMCNC: 28.6 G/DL (ref 28.4–34.8)
MCV RBC AUTO: 94.3 FL (ref 82.6–102.9)
MONOCYTES # BLD: 9 % (ref 3–12)
NRBC AUTOMATED: 0 PER 100 WBC
PDW BLD-RTO: 13.2 % (ref 11.8–14.4)
PLATELET # BLD: 229 K/UL (ref 138–453)
PLATELET ESTIMATE: ABNORMAL
PMV BLD AUTO: 10 FL (ref 8.1–13.5)
POTASSIUM SERPL-SCNC: 3.7 MMOL/L (ref 3.7–5.3)
RBC # BLD: 3.34 M/UL (ref 3.95–5.11)
RBC # BLD: ABNORMAL 10*6/UL
SEG NEUTROPHILS: 59 % (ref 36–65)
SEGMENTED NEUTROPHILS ABSOLUTE COUNT: 3.66 K/UL (ref 1.5–8.1)
SODIUM BLD-SCNC: 140 MMOL/L (ref 135–144)
TROPONIN INTERP: NORMAL
TROPONIN T: <0.03 NG/ML
TSH SERPL DL<=0.05 MIU/L-ACNC: 2.81 MIU/L (ref 0.3–5)
WBC # BLD: 6.2 K/UL (ref 3.5–11.3)
WBC # BLD: ABNORMAL 10*3/UL

## 2018-09-16 PROCEDURE — 84484 ASSAY OF TROPONIN QUANT: CPT

## 2018-09-16 PROCEDURE — 94660 CPAP INITIATION&MGMT: CPT

## 2018-09-16 PROCEDURE — 84443 ASSAY THYROID STIM HORMONE: CPT

## 2018-09-16 PROCEDURE — 36415 COLL VENOUS BLD VENIPUNCTURE: CPT

## 2018-09-16 PROCEDURE — 6360000002 HC RX W HCPCS: Performed by: STUDENT IN AN ORGANIZED HEALTH CARE EDUCATION/TRAINING PROGRAM

## 2018-09-16 PROCEDURE — 94664 DEMO&/EVAL PT USE INHALER: CPT

## 2018-09-16 PROCEDURE — 6370000000 HC RX 637 (ALT 250 FOR IP): Performed by: STUDENT IN AN ORGANIZED HEALTH CARE EDUCATION/TRAINING PROGRAM

## 2018-09-16 PROCEDURE — 85025 COMPLETE CBC W/AUTO DIFF WBC: CPT

## 2018-09-16 PROCEDURE — 99223 1ST HOSP IP/OBS HIGH 75: CPT | Performed by: INTERNAL MEDICINE

## 2018-09-16 PROCEDURE — 80048 BASIC METABOLIC PNL TOTAL CA: CPT

## 2018-09-16 PROCEDURE — 2060000000 HC ICU INTERMEDIATE R&B

## 2018-09-16 PROCEDURE — 2580000003 HC RX 258: Performed by: STUDENT IN AN ORGANIZED HEALTH CARE EDUCATION/TRAINING PROGRAM

## 2018-09-16 PROCEDURE — 94640 AIRWAY INHALATION TREATMENT: CPT

## 2018-09-16 PROCEDURE — 94762 N-INVAS EAR/PLS OXIMTRY CONT: CPT

## 2018-09-16 RX ORDER — FUROSEMIDE 10 MG/ML
40 INJECTION INTRAMUSCULAR; INTRAVENOUS 2 TIMES DAILY
Status: DISCONTINUED | OUTPATIENT
Start: 2018-09-17 | End: 2018-09-19

## 2018-09-16 RX ORDER — DIAZEPAM 5 MG/1
5 TABLET ORAL ONCE
Status: COMPLETED | OUTPATIENT
Start: 2018-09-16 | End: 2018-09-16

## 2018-09-16 RX ADMIN — OXYCODONE HYDROCHLORIDE AND ACETAMINOPHEN 1 TABLET: 5; 325 TABLET ORAL at 06:58

## 2018-09-16 RX ADMIN — MOMETASONE FUROATE AND FORMOTEROL FUMARATE DIHYDRATE 2 PUFF: 100; 5 AEROSOL RESPIRATORY (INHALATION) at 07:49

## 2018-09-16 RX ADMIN — FLUTICASONE PROPIONATE 1 SPRAY: 50 SPRAY, METERED NASAL at 10:40

## 2018-09-16 RX ADMIN — CETIRIZINE HYDROCHLORIDE 10 MG: 10 TABLET ORAL at 20:03

## 2018-09-16 RX ADMIN — ENOXAPARIN SODIUM 40 MG: 40 INJECTION SUBCUTANEOUS at 10:42

## 2018-09-16 RX ADMIN — HYDRALAZINE HYDROCHLORIDE 75 MG: 50 TABLET, FILM COATED ORAL at 06:58

## 2018-09-16 RX ADMIN — GABAPENTIN 800 MG: 800 TABLET ORAL at 10:40

## 2018-09-16 RX ADMIN — GABAPENTIN 800 MG: 800 TABLET ORAL at 20:03

## 2018-09-16 RX ADMIN — MOMETASONE FUROATE AND FORMOTEROL FUMARATE DIHYDRATE 2 PUFF: 100; 5 AEROSOL RESPIRATORY (INHALATION) at 23:32

## 2018-09-16 RX ADMIN — OXYCODONE HYDROCHLORIDE AND ACETAMINOPHEN 1 TABLET: 5; 325 TABLET ORAL at 10:52

## 2018-09-16 RX ADMIN — TIOTROPIUM BROMIDE 18 MCG: 18 CAPSULE ORAL; RESPIRATORY (INHALATION) at 07:50

## 2018-09-16 RX ADMIN — FUROSEMIDE 5 MG/HR: 10 INJECTION, SOLUTION INTRAMUSCULAR; INTRAVENOUS at 09:30

## 2018-09-16 RX ADMIN — FAMOTIDINE 20 MG: 20 TABLET, FILM COATED ORAL at 20:03

## 2018-09-16 RX ADMIN — DESMOPRESSIN ACETATE 40 MG: 0.2 TABLET ORAL at 20:03

## 2018-09-16 RX ADMIN — DIAZEPAM 5 MG: 5 TABLET ORAL at 00:53

## 2018-09-16 RX ADMIN — LISINOPRIL 20 MG: 20 TABLET ORAL at 10:41

## 2018-09-16 RX ADMIN — FERROUS SULFATE TAB EC 325 MG (65 MG FE EQUIVALENT) 325 MG: 325 (65 FE) TABLET DELAYED RESPONSE at 10:42

## 2018-09-16 RX ADMIN — AMLODIPINE BESYLATE 10 MG: 10 TABLET ORAL at 10:41

## 2018-09-16 RX ADMIN — OXYCODONE HYDROCHLORIDE AND ACETAMINOPHEN 1 TABLET: 5; 325 TABLET ORAL at 16:52

## 2018-09-16 RX ADMIN — FERROUS SULFATE TAB EC 325 MG (65 MG FE EQUIVALENT) 325 MG: 325 (65 FE) TABLET DELAYED RESPONSE at 19:03

## 2018-09-16 RX ADMIN — GABAPENTIN 800 MG: 800 TABLET ORAL at 16:53

## 2018-09-16 RX ADMIN — FAMOTIDINE 20 MG: 20 TABLET, FILM COATED ORAL at 10:41

## 2018-09-16 RX ADMIN — HYDRALAZINE HYDROCHLORIDE 75 MG: 50 TABLET, FILM COATED ORAL at 16:53

## 2018-09-16 ASSESSMENT — ENCOUNTER SYMPTOMS
HEMOPTYSIS: 0
ORTHOPNEA: 1
COUGH: 1
NAUSEA: 0
WHEEZING: 1
SPUTUM PRODUCTION: 1
BLOOD IN STOOL: 1
CONSTIPATION: 0
VOMITING: 0
PHOTOPHOBIA: 0
DIARRHEA: 0
BLURRED VISION: 0
HEARTBURN: 0
SHORTNESS OF BREATH: 1
ABDOMINAL PAIN: 0
SORE THROAT: 0

## 2018-09-16 ASSESSMENT — PAIN DESCRIPTION - DESCRIPTORS: DESCRIPTORS: ACHING

## 2018-09-16 ASSESSMENT — PAIN DESCRIPTION - PAIN TYPE
TYPE: CHRONIC PAIN

## 2018-09-16 ASSESSMENT — PAIN DESCRIPTION - LOCATION
LOCATION: WRIST;HAND;ARM

## 2018-09-16 ASSESSMENT — PAIN SCALES - GENERAL
PAINLEVEL_OUTOF10: 6
PAINLEVEL_OUTOF10: 7
PAINLEVEL_OUTOF10: 4

## 2018-09-16 ASSESSMENT — PAIN DESCRIPTION - ORIENTATION
ORIENTATION: RIGHT
ORIENTATION: RIGHT

## 2018-09-16 NOTE — PROGRESS NOTES
extraocular eye movements intact  Mouth - mucous membranes moist, pharynx normal without lesions  Chest - clear to auscultation, no wheezes, rales or rhonchi, symmetric air entry  Heart - normal rate, regular rhythm, normal S1, S2, no murmurs, rubs, clicks or gallops  Abdomen - soft, nontender, nondistended, no masses or organomegaly  Neurological - alert, oriented, normal speech, no focal findings or movement disorder noted  Musculoskeletal - no joint tenderness, deformity or swelling  Extremities - peripheral pulses normal, no pedal edema, no clubbing or cyanosis  Skin - normal coloration and turgor, no rashes, no suspicious skin lesions noted      Intake/Output Summary (Last 24 hours) at 09/16/18 1508  Last data filed at 09/16/18 1053   Gross per 24 hour   Intake              866 ml   Output             3050 ml   Net            -2184 ml     Physical Exam   Constitutional: She is oriented to person, place, and time. She appears well-developed and well-nourished. No distress. HENT:   Head: Normocephalic and atraumatic. Mouth/Throat: Oropharynx is clear and moist. No oropharyngeal exudate. Eyes: Pupils are equal, round, and reactive to light. Conjunctivae and EOM are normal. No scleral icterus. Neck: Normal range of motion. Neck supple. No JVD present. No thyromegaly present. Cardiovascular: Regular rhythm, normal heart sounds and intact distal pulses. No murmur heard. Pulmonary/Chest: Effort normal. No respiratory distress. She has wheezes (B/L expiratory). Abdominal: Soft. Bowel sounds are normal. She exhibits no distension and no mass. There is no tenderness. Musculoskeletal: Normal range of motion. She exhibits no edema or tenderness. Lymphadenopathy:     She has no cervical adenopathy. Neurological: She is alert and oriented to person, place, and time. No cranial nerve deficit. Skin: Skin is warm. Psychiatric: She has a normal mood and affect.  Her behavior is normal.       Medications:  [START ON 9/17/2018] furosemide  40 mg Intravenous BID    amLODIPine  10 mg Oral Daily    atorvastatin  40 mg Oral Nightly    famotidine  20 mg Oral BID    ferrous sulfate  325 mg Oral BID WC    fluticasone  1 spray Nasal Daily    hydrALAZINE  75 mg Oral 3 times per day    lisinopril  20 mg Oral Daily    cetirizine  10 mg Oral Daily    tiotropium  18 mcg Inhalation Daily    sodium chloride flush  10 mL Intravenous 2 times per day    enoxaparin  40 mg Subcutaneous Daily    mometasone-formoterol  2 puff Inhalation BID    gabapentin  800 mg Oral TID       Diagnostic Labs and Imaging    CBC: Recent Labs      09/15/18   1657 09/16/18   0633   WBC  8.6  6.2   RBC  2.96*  3.34*   HGB  8.0*  9.0*   HCT  27.7*  31.5*   MCV  93.6  94.3   RDW  13.1  13.2   PLT  252  229     BMP: Recent Labs      09/15/18   1657  09/16/18   0633   NA  142  140   K  4.0  3.7   CL  100  97*   CO2  31  29   BUN  22*  17   CREATININE  0.82  0.73     BNP: No results for input(s): BNP in the last 72 hours. PT/INR: No results for input(s): PROTIME, INR in the last 72 hours. APTT: No results for input(s): APTT in the last 72 hours. CARDIAC ENZYMES: Recent Labs      09/15/18   1649  09/15/18   2005   TROPONINI  0.00  0.01     FASTING LIPID PANEL:  Lab Results   Component Value Date    TRIG 102 02/15/2018     LIVER PROFILE: No results for input(s): AST, ALT, ALB, BILIDIR, BILITOT, ALKPHOS in the last 72 hours. Xr Chest Portable    Result Date: 9/15/2018  EXAMINATION: SINGLE XRAY VIEW OF THE CHEST 9/15/2018 4:55 pm COMPARISON: February 19, 2018 and February 17, 2018  Diminished penetration through soft tissue without convincing evidence for acute airspace disease. Cardiac silhouette enlargement is again noted. Assessment and Plan:   Principal Problem:    Acute on chronic diastolic CHF (congestive heart failure) (HCC)  Active Problems:     Morbid obesity ([address]    Obesity hypoventilation syndrome (HCC)    SOB (shortness of

## 2018-09-16 NOTE — ED PROVIDER NOTES
[facility] [address]  Emergency Department  Emergency Medicine Resident Sign-out     Care of Robina Vides was assumed from Dr. Reynaldo Louie and is being seen for Congestive Heart Failure (SOB. pulmonary hypertension. was on lasix switched to diff diuretic and now SOB weight gain)  . The patient's initial evaluation and plan have been discussed with the prior provider who initially evaluated the patient.      EMERGENCY DEPARTMENT COURSE / MEDICAL DECISION MAKING:       MEDICATIONS GIVEN:  Orders Placed This Encounter   Medications    furosemide (LASIX) 100 mg in dextrose 5 % 100 mL infusion    sodium chloride flush 0.9 % injection 10 mL    sodium chloride flush 0.9 % injection 10 mL    acetaminophen (TYLENOL) tablet 650 mg    albuterol sulfate  (90 Base) MCG/ACT inhaler 2 puff    amLODIPine (NORVASC) tablet 10 mg    atorvastatin (LIPITOR) tablet 40 mg    cloNIDine (CATAPRES) tablet 0.1 mg    famotidine (PEPCID) tablet 20 mg    ferrous sulfate tablet 325 mg    fluticasone (FLONASE) 50 MCG/ACT nasal spray 1 spray    fluticasone-salmeterol (ADVAIR) 100-50 MCG/DOSE diskus inhaler 1 puff    hydrALAZINE (APRESOLINE) tablet 75 mg    ipratropium-albuterol (DUONEB) nebulizer solution 3 mL    lisinopril (PRINIVIL;ZESTRIL) tablet 20 mg    cetirizine (ZYRTEC) tablet 10 mg    nicotine (NICODERM CQ) 21 MG/24HR 1 patch    tiotropium (SPIRIVA) inhalation capsule 18 mcg    sodium chloride flush 0.9 % injection 10 mL    sodium chloride flush 0.9 % injection 10 mL    magnesium hydroxide (MILK OF MAGNESIA) 400 MG/5ML suspension 30 mL    ondansetron (ZOFRAN) injection 4 mg    enoxaparin (LOVENOX) injection 40 mg       LABS / RADIOLOGY:     Labs Reviewed   CBC WITH AUTO DIFFERENTIAL - Abnormal; Notable for the following:        Result Value    RBC 2.96 (*)     Hemoglobin 8.0 (*)     Hematocrit 27.7 (*)     Seg Neutrophils 67 (*)     Lymphocytes 21 (*)     Immature Granulocytes 1 (*)     All other components within normal limits   BASIC METABOLIC PANEL - Abnormal; Notable for the following:     Glucose 122 (*)     BUN 22 (*)     All other components within normal limits   BASIC METABOLIC PANEL W/ REFLEX TO MG FOR LOW K    CBC WITH AUTO DIFFERENTIAL   BRAIN NATRIURETIC PEPTIDE   TROPONIN   TROPONIN   POCT TROPONIN   POCT TROPONIN   POCT TROPONIN   POCT TROPONIN       Xr Chest Portable    Result Date: 9/15/2018  EXAMINATION: SINGLE XRAY VIEW OF THE CHEST 9/15/2018 4:55 pm COMPARISON: February 19, 2018 and February 17, 2018 HISTORY: ORDERING SYSTEM PROVIDED HISTORY: sob TECHNOLOGIST PROVIDED HISTORY: sob FINDINGS: Diminished penetration through the soft tissue is noted. Cardiac silhouette enlargement is again noted. The tracheostomy tube is no longer present. No focal area of consolidation is identified. No evidence for pneumothorax or convincing evidence for interstitial edema. No effusion identified. The osseous structures appear unchanged. Diminished penetration through soft tissue without convincing evidence for acute airspace disease. Cardiac silhouette enlargement is again noted. RECENT VITALS:     Temp: 96.8 °F (36 °C),  Pulse: 68, Resp: 18, BP: 129/76, SpO2: 100 %    This patient is a 37 y.o. Female with Chief complaint of shortness of breath. Patient is a history of CHF. Workup is complete. Appears that she is fluid overloaded. Cardiology has been spoken to, and the plan is to start the patient on a Lasix drip. Patient's hemodynamically stable and awaiting transfer to the floor. OUTSTANDING TASKS / RECOMMENDATIONS:    1. Awaiting transfer to the floor     FINAL IMPRESSION:     1.  Acute on chronic congestive heart failure, unspecified heart failure type ([facility] [address]        DISPOSITION:         DISPOSITION:  []  Discharge   []  Transfer -    [x]  Admission -  Internal Medicine   []  Against Medical Advice   []  Eloped   FOLLOW-UP: Ney Lemon           DISCHARGE MEDICATIONS: Current Discharge Medication

## 2018-09-16 NOTE — CONSULTS
she does not drink alcohol or use drugs. Family History: family history is not on file. No h/o sudden cardiac death. No for premature CAD    REVIEW OF SYSTEMS:    · Constitutional: there has been no unanticipated weight loss. There's been No change in energy level, No change in activity level. · Eyes: No visual changes or diplopia. No scleral icterus. · ENT: No Headaches  · Cardiovascular:  No cardiac history. Remaining as above  · Respiratory: No previous pulmonary problems, No cough  · Gastrointestinal: No abdominal pain. No change in bowel or bladder habits. · Genitourinary: No dysuria, trouble voiding, or hematuria. · Musculoskeletal:  No gait disturbance, No weakness or joint complaints. · Integumentary: No rash or pruritis. · Neurological: No headache, diplopia, change in muscle strength, numbness or tingling. No change in gait, balance, coordination, mood, affect, memory, mentation, behavior. · Psychiatric: No anxiety, or depression. · Endocrine: No temperature intolerance. No excessive thirst, fluid intake, or urination. No tremor. · Hematologic/Lymphatic: No abnormal bruising or bleeding, blood clots or swollen lymph nodes. · Allergic/Immunologic: No nasal congestion or hives.       PHYSICAL EXAM:      /73   Pulse 72   Temp 97.9 °F (36.6 °C) (Axillary)   Resp 13   Ht 5' (1.524 m)   Wt (!) 379 lb 14.4 oz (172.3 kg)   SpO2 100%   BMI 74.19 kg/m²      Intake/Output Summary (Last 24 hours) at 09/16/18 0103  Last data filed at 09/15/18 1475   Gross per 24 hour   Intake                0 ml   Output              350 ml   Net             -350 ml         Constitutional and General Appearance: alert, cooperative, no distress and appears stated age    Eyes:  pupils equal and reactive, extraocular eye movements intact  Mouth, Throat:  mucous membranes moist, pharynx normal without lesions  Cardiovascular:  regular rate and rhythm, S1, S2 normal, no S3 or S4, no click and no

## 2018-09-16 NOTE — H&P
 COPD (chronic obstructive pulmonary disease) (HCC)     Hypertension     Pulmonary hypertension ([address]        Past Surgical History:        Procedure Laterality Date    GASTROSTOMY TUBE PLACEMENT  02/2018    Removed in April 2018    [facility]         [place] OFFICE/OUTPT VISIT,PROCEDURE ONLY N/A 2/17/2018    TRACHEOTOMY performed by Mariana Bird MD at [address]  03/2018    TRACHEOTOMY  02/2018       Medications Prior to Admission:    Prescriptions Prior to Admission: ipratropium-albuterol (DUONEB) 0.5-2.5 (3) MG/3ML SOLN nebulizer solution, Inhale 3 mLs into the lungs 4 times daily  atorvastatin (LIPITOR) 40 MG tablet, Take 1 tablet by mouth nightly  cloNIDine (CATAPRES) 0.1 MG tablet, Take 1 tablet by mouth 3 times daily as needed (SBP>160)  hydrALAZINE (APRESOLINE) 25 MG tablet, Take 3 tablets by mouth every 8 hours  Glycopyrrolate 0.4 MG/2ML SOLN injection, Infuse 0.5 mLs intravenously 3 times daily  famotidine (PEPCID) 20 MG tablet, Take 1 tablet by mouth 2 times daily  nicotine (NICODERM CQ) 21 MG/24HR, Place 1 patch onto the skin every 24 hours  doxycycline hyclate (VIBRA-TABS) 100 MG tablet, Take 100 mg by mouth 2 times daily 2 times daily for 10 days  furosemide (LASIX) 20 MG tablet, Take 20 mg by mouth daily  tiotropium (SPIRIVA) 18 MCG inhalation capsule, Inhale 18 mcg into the lungs daily  albuterol sulfate  (90 Base) MCG/ACT inhaler, Inhale 2 puffs into the lungs every 6 hours as needed for Wheezing  ferrous sulfate 325 (65 Fe) MG tablet, Take 325 mg by mouth 2 times daily (with meals)   amLODIPine (NORVASC) 10 MG tablet, Take 10 mg by mouth daily  lisinopril (PRINIVIL;ZESTRIL) 20 MG tablet, Take 20 mg by mouth daily  loratadine (CLARITIN) 10 MG capsule, Take 10 mg by mouth daily  Fluticasone-Salmeterol (ADVAIR DISKUS IN), Inhale into the lungs  fluticasone (FLONASE) 50 MCG/ACT nasal spray, 1 spray by Nasal route daily    Allergies:  Katey Form [aspirin]; Beta adrenergic blockers; and Sulfa antibiotics    Social History:   Social History     Social History    Marital status: Single     Spouse name: N/A    Number of children: N/A    Years of education: N/A     Social History Main Topics    Smoking status: Former Smoker     Quit date: 1/15/2018    Smokeless tobacco: Never Used    Alcohol use No    Drug use: No    Sexual activity: Not Asked     Other Topics Concern    None     Social History Narrative    None       Family History:   History reviewed. No pertinent family history. REVIEW OF SYSTEMS:     Review of Systems   Constitutional: Positive for malaise/fatigue. Negative for chills, diaphoresis and fever. Weight gain+   HENT: Negative for congestion, ear pain, nosebleeds and sore throat. Eyes: Negative for blurred vision, photophobia and pain. Respiratory: Positive for cough and shortness of breath. Negative for hemoptysis, sputum production and wheezing. Cardiovascular: Positive for chest pain, orthopnea, leg swelling and PND. Negative for palpitations and claudication. Gastrointestinal: Negative for abdominal pain, blood in stool, constipation, diarrhea, heartburn, melena, nausea and vomiting. Genitourinary: Negative for dysuria, flank pain, frequency, hematuria and urgency. Oliguria+   Musculoskeletal: Negative for back pain, myalgias and neck pain. Skin: Negative for rash. Neurological: Negative for dizziness, seizures, loss of consciousness, weakness and headaches. Endo/Heme/Allergies: Negative for polydipsia. Does not bruise/bleed easily. Psychiatric/Behavioral: Negative for depression, substance abuse and suicidal ideas.      Vitals:  /67   Pulse 65   Temp 97.7 °F (36.5 °C) (Oral)   Resp 16   Ht 5' (1.524 m)   Wt (!) 379 lb 14.4 oz (172.3 kg)   SpO2 100%   BMI 74.19 kg/m²     PHYSICAL EXAMINATION:  Vitals:    09/15/18 1852 09/15/18 1902 09/15/18 1956 09/15/18 2056   BP: 103/67 129/76 138/67   Pulse:   68 65   Resp:    16   Temp:    97.7 °F (36.5 °C)   TempSrc:    Oral   SpO2: 100% 100% 100% 100%   Weight:    (!) 379 lb 14.4 oz (172.3 kg)   Height:    5' (1.524 m)     Physical Exam   Constitutional: She is oriented to person, place, and time. She appears well-developed and well-nourished. She appears distressed. HENT:   Head: Normocephalic and atraumatic. Mouth/Throat: Oropharynx is clear and moist. No oropharyngeal exudate. Eyes: Pupils are equal, round, and reactive to light. Conjunctivae and EOM are normal. No scleral icterus. Neck: Normal range of motion. Neck supple. No JVD present. No thyromegaly present. Cardiovascular: Normal rate, regular rhythm and normal heart sounds. No murmur heard. Pulmonary/Chest: No respiratory distress. She has no wheezes (B/L expiratory). She has no rales. She exhibits no tenderness. B/L air entry+, no decreased air entry. Abdominal: Soft. Bowel sounds are normal. She exhibits no distension and no mass. There is no tenderness. Musculoskeletal: Normal range of motion. She exhibits edema (severe b/l LE edema upto mid shin. Mild edema of forearms). She exhibits no tenderness or deformity. Lymphadenopathy:     She has no cervical adenopathy. Neurological: She is alert and oriented to person, place, and time. No cranial nerve deficit. No motor or sensory deficit   Skin: Skin is warm. No rash noted. She is not diaphoretic. No erythema. Psychiatric: She has a normal mood and affect.  Her behavior is normal. Thought content normal.     Trach scar     DATA:  Old records have not been requested  CBC with Differential:    Lab Results   Component Value Date    WBC 8.6 09/15/2018    RBC 2.96 09/15/2018    HGB 8.0 09/15/2018    HCT 27.7 09/15/2018     09/15/2018    MCV 93.6 09/15/2018    MCH 27.0 09/15/2018    MCHC 28.9 09/15/2018    RDW 13.1 09/15/2018    LYMPHOPCT 21 09/15/2018    MONOPCT 9 09/15/2018    BASOPCT 0 09/15/2018    MONOSABS TROPONINI 0.00 09/15/2018    TROPONINI 0.21 01/26/2018     U/A:    Lab Results   Component Value Date    COLORU YELLOW 01/26/2018    PROTEINU TRACE 01/26/2018    PHUR 6.5 01/26/2018    WBCUA 2 TO 5 01/26/2018    RBCUA 0 TO 2 01/26/2018    MUCUS 1+ 01/26/2018    TRICHOMONAS NOT REPORTED 01/26/2018    YEAST NOT REPORTED 01/26/2018    BACTERIA NOT REPORTED 01/26/2018    SPECGRAV 1.013 01/26/2018    LEUKOCYTESUR NEGATIVE 01/26/2018    UROBILINOGEN Normal 01/26/2018    BILIRUBINUR NEGATIVE 01/26/2018    GLUCOSEU NEGATIVE 01/26/2018    AMORPHOUS NOT REPORTED 01/26/2018     ABG:    Lab Results   Component Value Date    QKZ4SHN 38 02/19/2018     VBG:    Lab Results   Component Value Date    PHVEN 7.334 01/31/2018    YIL8HSZ 61.4 01/31/2018    L5FSORQC 88 01/31/2018     HgBA1c:  No components found for: HGBA1C  TSH:    Lab Results   Component Value Date    TSH 1.23 01/27/2018       ASSESSMENT and PLAN:    1. Acute on chr cor pulmonale   larry   Morbid obesity   Previous trach     - ECHO done in 01/2018 shows LVEF 65% with hyperdynamic systolic function and RV dilation  - Cardiac diet, Fluids restriction at 1500mL/day and Na <2g/day  - Lasix IV infusion  - BiPAP as necessary, at night  - Maintain 3L oxygen through nasal canula  - Follow BNP and troponin now  - Follow BMP, CBC in the AM  - CHF nurse consult  - Cardiology consult    2. Asthma unspecified severity   - Continue inhaler, Spiriva and Flonase  - albuterol nebulizations as necessary  - RT evaluation and treatment    3. HTN   Chronic diastolic chf   - Continue Clonidine, hydralazine, Amlodipine and Lisinopril    Up as tolerated  PT/OT eval and treat  Lovenox 40mg SC, daily  Cardiac diet, fluids <1500mL, Na<2g/day    Please note that this chart was generated using voice recognition dictation software. Although every effort was made to ensure the accuracy of this automated transcription, some errors in transcription may have occurred.      Kody Souza MD  PGY-1

## 2018-09-16 NOTE — PROGRESS NOTES
Physical Therapy  DATE: 2018    NAME: Fern Morelos  MRN: [id number]   : 1975    Patient not seen this date for Physical Therapy due to:  [] Blood transfusion in progress  [] Hemodialysis  [x]  Patient Declined: Pt requesting to wait until clothes arrive from home. Will check   [] Spine Precautions   [] Strict Bedrest  [] Surgery/ Procedure  [] Testing      [] Other        [] PT being discontinued at this time. Patient independent. No further needs. [] PT being discontinued at this time as the patient has been transferred to palliative care. No further needs.     Chick Media, PT

## 2018-09-16 NOTE — PLAN OF CARE
Problem: RESPIRATORY  Intervention: Respiratory assessment  BRONCHOSPASM/BRONCHOCONSTRICTION     [x]         IMPROVE AERATION/BREATH SOUNDS  [x]   ADMINISTER BRONCHODILATOR THERAPY AS APPROPRIATE  [x]   ASSESS BREATH SOUNDS  [x]   IMPLEMENT AEROSOL/MDI PROTOCOL  [x]   PATIENT EDUCATION AS NEEDED    Inhaler / Aerosol Education        [x] Served spacer    [] Provided and reviewed booklet   [x] Good return demonstration per patient   [] Aerosolized Medications:     Verbal education has been provided in the use, benefits and possible adverse reactions of aerosolized medications used in the treatment of this patient.     [] Other:

## 2018-09-16 NOTE — ED NOTES
Report taken from [facility]. Pt resting on stretcher. RR even and nonlabored. No complaints at this time. Will continue to monitor.      Julio Cesar Nascimento RN  09/15/18 2005

## 2018-09-17 LAB
ABSOLUTE EOS #: 0.25 K/UL (ref 0–0.4)
ABSOLUTE IMMATURE GRANULOCYTE: 0 K/UL (ref 0–0.3)
ABSOLUTE LYMPH #: 1.89 K/UL (ref 1–4.8)
ABSOLUTE MONO #: 0.63 K/UL (ref 0.1–0.8)
ALLEN TEST: ABNORMAL
ANION GAP SERPL CALCULATED.3IONS-SCNC: 13 MMOL/L (ref 9–17)
BASOPHILS # BLD: 0 % (ref 0–2)
BASOPHILS ABSOLUTE: 0 K/UL (ref 0–0.2)
BUN BLDV-MCNC: 26 MG/DL (ref 6–20)
BUN/CREAT BLD: ABNORMAL (ref 9–20)
CALCIUM SERPL-MCNC: 8 MG/DL (ref 8.6–10.4)
CARBOXYHEMOGLOBIN: 1.4 % (ref 0–5)
CHLORIDE BLD-SCNC: 97 MMOL/L (ref 98–107)
CO2: 33 MMOL/L (ref 20–31)
CREAT SERPL-MCNC: 1.02 MG/DL (ref 0.5–0.9)
CREATININE URINE: 75.3 MG/DL (ref 28–217)
DIFFERENTIAL TYPE: ABNORMAL
EOSINOPHILS RELATIVE PERCENT: 4 % (ref 1–4)
FIO2: ABNORMAL
GFR AFRICAN AMERICAN: >60 ML/MIN
GFR NON-AFRICAN AMERICAN: 59 ML/MIN
GFR SERPL CREATININE-BSD FRML MDRD: ABNORMAL ML/MIN/{1.73_M2}
GFR SERPL CREATININE-BSD FRML MDRD: ABNORMAL ML/MIN/{1.73_M2}
GLUCOSE BLD-MCNC: 97 MG/DL (ref 70–99)
HCO3 VENOUS: 32.1 MMOL/L (ref 24–30)
HCT VFR BLD CALC: 29.3 % (ref 36.3–47.1)
HEMOGLOBIN: 8.3 G/DL (ref 11.9–15.1)
IMMATURE GRANULOCYTES: 0 %
LYMPHOCYTES # BLD: 30 % (ref 24–44)
MCH RBC QN AUTO: 27.2 PG (ref 25.2–33.5)
MCHC RBC AUTO-ENTMCNC: 28.3 G/DL (ref 28.4–34.8)
MCV RBC AUTO: 96.1 FL (ref 82.6–102.9)
METHEMOGLOBIN: ABNORMAL % (ref 0–1.5)
MODE: ABNORMAL
MONOCYTES # BLD: 10 % (ref 1–7)
MORPHOLOGY: ABNORMAL
NEGATIVE BASE EXCESS, VEN: ABNORMAL MMOL/L (ref 0–2)
NOTIFICATION TIME: ABNORMAL
NOTIFICATION: ABNORMAL
NRBC AUTOMATED: 0 PER 100 WBC
O2 DEVICE/FLOW/%: ABNORMAL
O2 SAT, VEN: 98.8 % (ref 60–85)
OXYHEMOGLOBIN: ABNORMAL % (ref 95–98)
PATIENT TEMP: 37
PCO2, VEN, TEMP ADJ: ABNORMAL MMHG (ref 39–55)
PCO2, VEN: 57.2 (ref 39–55)
PDW BLD-RTO: 13.2 % (ref 11.8–14.4)
PEEP/CPAP: ABNORMAL
PH VENOUS: 7.37 (ref 7.32–7.42)
PH, VEN, TEMP ADJ: ABNORMAL (ref 7.32–7.42)
PLATELET # BLD: 235 K/UL (ref 138–453)
PLATELET ESTIMATE: ABNORMAL
PMV BLD AUTO: 10.1 FL (ref 8.1–13.5)
PO2, VEN, TEMP ADJ: ABNORMAL MMHG (ref 30–50)
PO2, VEN: 108 (ref 30–50)
POSITIVE BASE EXCESS, VEN: 6.4 MMOL/L (ref 0–2)
POTASSIUM SERPL-SCNC: 3.6 MMOL/L (ref 3.7–5.3)
PSV: ABNORMAL
PT. POSITION: ABNORMAL
RBC # BLD: 3.05 M/UL (ref 3.95–5.11)
RBC # BLD: ABNORMAL 10*6/UL
RESPIRATORY RATE: ABNORMAL
SAMPLE SITE: ABNORMAL
SEG NEUTROPHILS: 56 % (ref 36–66)
SEGMENTED NEUTROPHILS ABSOLUTE COUNT: 3.53 K/UL (ref 1.8–7.7)
SET RATE: ABNORMAL
SODIUM BLD-SCNC: 143 MMOL/L (ref 135–144)
TEXT FOR RESPIRATORY: ABNORMAL
TOTAL HB: ABNORMAL G/DL (ref 12–16)
TOTAL PROTEIN, URINE: 5 MG/DL
TOTAL RATE: ABNORMAL
URINE TOTAL PROTEIN CREATININE RATIO: 0.07 (ref 0–0.2)
VT: ABNORMAL
WBC # BLD: 6.3 K/UL (ref 3.5–11.3)
WBC # BLD: ABNORMAL 10*3/UL

## 2018-09-17 PROCEDURE — 82570 ASSAY OF URINE CREATININE: CPT

## 2018-09-17 PROCEDURE — 6360000002 HC RX W HCPCS

## 2018-09-17 PROCEDURE — 97162 PT EVAL MOD COMPLEX 30 MIN: CPT

## 2018-09-17 PROCEDURE — 6370000000 HC RX 637 (ALT 250 FOR IP): Performed by: STUDENT IN AN ORGANIZED HEALTH CARE EDUCATION/TRAINING PROGRAM

## 2018-09-17 PROCEDURE — 97530 THERAPEUTIC ACTIVITIES: CPT

## 2018-09-17 PROCEDURE — 84156 ASSAY OF PROTEIN URINE: CPT

## 2018-09-17 PROCEDURE — 2580000003 HC RX 258: Performed by: STUDENT IN AN ORGANIZED HEALTH CARE EDUCATION/TRAINING PROGRAM

## 2018-09-17 PROCEDURE — 99233 SBSQ HOSP IP/OBS HIGH 50: CPT | Performed by: INTERNAL MEDICINE

## 2018-09-17 PROCEDURE — 6360000002 HC RX W HCPCS: Performed by: INTERNAL MEDICINE

## 2018-09-17 PROCEDURE — 82805 BLOOD GASES W/O2 SATURATION: CPT

## 2018-09-17 PROCEDURE — 94660 CPAP INITIATION&MGMT: CPT

## 2018-09-17 PROCEDURE — 2060000000 HC ICU INTERMEDIATE R&B

## 2018-09-17 PROCEDURE — 6370000000 HC RX 637 (ALT 250 FOR IP): Performed by: NURSE PRACTITIONER

## 2018-09-17 PROCEDURE — 94640 AIRWAY INHALATION TREATMENT: CPT

## 2018-09-17 PROCEDURE — 6360000002 HC RX W HCPCS: Performed by: STUDENT IN AN ORGANIZED HEALTH CARE EDUCATION/TRAINING PROGRAM

## 2018-09-17 PROCEDURE — 36415 COLL VENOUS BLD VENIPUNCTURE: CPT

## 2018-09-17 PROCEDURE — 94762 N-INVAS EAR/PLS OXIMTRY CONT: CPT

## 2018-09-17 PROCEDURE — G8978 MOBILITY CURRENT STATUS: HCPCS

## 2018-09-17 PROCEDURE — 99253 IP/OBS CNSLTJ NEW/EST LOW 45: CPT | Performed by: FAMILY MEDICINE

## 2018-09-17 PROCEDURE — 85025 COMPLETE CBC W/AUTO DIFF WBC: CPT

## 2018-09-17 PROCEDURE — G8979 MOBILITY GOAL STATUS: HCPCS

## 2018-09-17 PROCEDURE — 80048 BASIC METABOLIC PNL TOTAL CA: CPT

## 2018-09-17 RX ORDER — SPIRONOLACTONE 25 MG/1
25 TABLET ORAL DAILY
Status: DISCONTINUED | OUTPATIENT
Start: 2018-09-17 | End: 2018-09-21 | Stop reason: HOSPADM

## 2018-09-17 RX ORDER — FUROSEMIDE 10 MG/ML
INJECTION INTRAMUSCULAR; INTRAVENOUS
Status: COMPLETED
Start: 2018-09-17 | End: 2018-09-17

## 2018-09-17 RX ORDER — ISOSORBIDE DINITRATE 10 MG/1
20 TABLET ORAL 3 TIMES DAILY
Status: DISCONTINUED | OUTPATIENT
Start: 2018-09-17 | End: 2018-09-21 | Stop reason: HOSPADM

## 2018-09-17 RX ADMIN — FAMOTIDINE 20 MG: 20 TABLET, FILM COATED ORAL at 08:36

## 2018-09-17 RX ADMIN — OXYCODONE HYDROCHLORIDE AND ACETAMINOPHEN 1 TABLET: 5; 325 TABLET ORAL at 15:21

## 2018-09-17 RX ADMIN — HYDRALAZINE HYDROCHLORIDE 75 MG: 50 TABLET, FILM COATED ORAL at 17:06

## 2018-09-17 RX ADMIN — HYDRALAZINE HYDROCHLORIDE 75 MG: 50 TABLET, FILM COATED ORAL at 08:37

## 2018-09-17 RX ADMIN — MOMETASONE FUROATE AND FORMOTEROL FUMARATE DIHYDRATE 2 PUFF: 100; 5 AEROSOL RESPIRATORY (INHALATION) at 23:44

## 2018-09-17 RX ADMIN — FERROUS SULFATE TAB EC 325 MG (65 MG FE EQUIVALENT) 325 MG: 325 (65 FE) TABLET DELAYED RESPONSE at 19:21

## 2018-09-17 RX ADMIN — SPIRONOLACTONE 25 MG: 25 TABLET ORAL at 20:48

## 2018-09-17 RX ADMIN — ENOXAPARIN SODIUM 40 MG: 40 INJECTION SUBCUTANEOUS at 08:36

## 2018-09-17 RX ADMIN — FUROSEMIDE 40 MG: 10 INJECTION, SOLUTION INTRAMUSCULAR; INTRAVENOUS at 19:22

## 2018-09-17 RX ADMIN — ISOSORBIDE DINITRATE 20 MG: 10 TABLET ORAL at 20:48

## 2018-09-17 RX ADMIN — CETIRIZINE HYDROCHLORIDE 10 MG: 10 TABLET ORAL at 20:49

## 2018-09-17 RX ADMIN — FLUTICASONE PROPIONATE 1 SPRAY: 50 SPRAY, METERED NASAL at 08:36

## 2018-09-17 RX ADMIN — GABAPENTIN 800 MG: 800 TABLET ORAL at 17:06

## 2018-09-17 RX ADMIN — OXYCODONE HYDROCHLORIDE AND ACETAMINOPHEN 1 TABLET: 5; 325 TABLET ORAL at 08:38

## 2018-09-17 RX ADMIN — FAMOTIDINE 20 MG: 20 TABLET, FILM COATED ORAL at 20:49

## 2018-09-17 RX ADMIN — Medication 10 ML: at 20:50

## 2018-09-17 RX ADMIN — FERROUS SULFATE TAB EC 325 MG (65 MG FE EQUIVALENT) 325 MG: 325 (65 FE) TABLET DELAYED RESPONSE at 08:37

## 2018-09-17 RX ADMIN — LISINOPRIL 20 MG: 20 TABLET ORAL at 08:37

## 2018-09-17 RX ADMIN — GABAPENTIN 800 MG: 800 TABLET ORAL at 08:37

## 2018-09-17 RX ADMIN — OXYCODONE HYDROCHLORIDE AND ACETAMINOPHEN 1 TABLET: 5; 325 TABLET ORAL at 20:49

## 2018-09-17 RX ADMIN — Medication 10 ML: at 08:35

## 2018-09-17 RX ADMIN — FUROSEMIDE 40 MG: 10 INJECTION, SOLUTION INTRAMUSCULAR; INTRAVENOUS at 10:25

## 2018-09-17 RX ADMIN — MOMETASONE FUROATE AND FORMOTEROL FUMARATE DIHYDRATE 2 PUFF: 100; 5 AEROSOL RESPIRATORY (INHALATION) at 08:05

## 2018-09-17 RX ADMIN — AMLODIPINE BESYLATE 10 MG: 10 TABLET ORAL at 08:38

## 2018-09-17 RX ADMIN — GABAPENTIN 800 MG: 800 TABLET ORAL at 20:49

## 2018-09-17 RX ADMIN — TIOTROPIUM BROMIDE 18 MCG: 18 CAPSULE ORAL; RESPIRATORY (INHALATION) at 08:05

## 2018-09-17 RX ADMIN — DESMOPRESSIN ACETATE 40 MG: 0.2 TABLET ORAL at 20:48

## 2018-09-17 RX ADMIN — ISOSORBIDE DINITRATE 20 MG: 10 TABLET ORAL at 13:36

## 2018-09-17 ASSESSMENT — PAIN SCALES - GENERAL
PAINLEVEL_OUTOF10: 4
PAINLEVEL_OUTOF10: 5
PAINLEVEL_OUTOF10: 7
PAINLEVEL_OUTOF10: 7
PAINLEVEL_OUTOF10: 6
PAINLEVEL_OUTOF10: 6

## 2018-09-17 ASSESSMENT — ENCOUNTER SYMPTOMS
ABDOMINAL PAIN: 0
DIARRHEA: 0
SORE THROAT: 0
HEARTBURN: 0
ORTHOPNEA: 1
NAUSEA: 0
SPUTUM PRODUCTION: 1
BLOOD IN STOOL: 1
HEMOPTYSIS: 0
PHOTOPHOBIA: 0
VOMITING: 0
COUGH: 1
SHORTNESS OF BREATH: 1
WHEEZING: 1
BLURRED VISION: 0
CONSTIPATION: 0

## 2018-09-17 ASSESSMENT — PAIN DESCRIPTION - DESCRIPTORS: DESCRIPTORS: ACHING

## 2018-09-17 ASSESSMENT — PAIN DESCRIPTION - ORIENTATION: ORIENTATION: RIGHT

## 2018-09-17 ASSESSMENT — PAIN DESCRIPTION - PAIN TYPE: TYPE: CHRONIC PAIN

## 2018-09-17 ASSESSMENT — PAIN DESCRIPTION - LOCATION: LOCATION: WRIST;HAND;ARM

## 2018-09-17 NOTE — FLOWSHEET NOTE
Type of Healthcare Directive Durable power of  for health care   Copy in Chart Yes, copy in chart   Date Reviewed and Current: 09/17/18   Advance Directives Documents explained; Healthcare power of attornery completed   Healthcare Agent's Name Juan Weaver, sister   Healthcare Agent's Phone Number [phone]

## 2018-09-17 NOTE — PROGRESS NOTES
to improve endurance.   Short term goal 5: Pt to navigate 3 stairs with mod A  Patient Goals   Patient goals : Breathe better and go home       Therapy Time   Individual Concurrent Group Co-treatment   Time In 0840         Time Out 6034         Minutes 25         Timed Code Treatment Minutes: [address] [facility]  Bryce cedillo,

## 2018-09-17 NOTE — PROGRESS NOTES
Smoking Cessation - topics covered   []  Health Risks  []  Benefits of Quitting   []  Smoking Cessation  []  Patient has no history of tobacco use  [x]  Patient is former smoker. Patient quit in 2018. [x]  No need for tobacco cessation education. []  Booklet given  []  Patient verbalizes understanding. []  Patient denies need for tobacco cessation education.   Serafin Lauren  9:06 AM

## 2018-09-17 NOTE — CONSULTS
service  Caregiver support/education  Code status clarified: Full Code  Code status clarified: [address]  Code status clarified: DNRCCA     Principle Problem/Diagnosis:  Acute on chronic diastolic CHF (congestive heart failure) ([facility] [address]    Additional Assessments:   Acute pulmonary edema  Hypertensive emergency  Acute respiratory failure with hypoxia and hypercapnia  Former smoker  SOB  COPD on home oxygen  Morbid obesity  OHS  Former smoker    1- Symptom management/ pain control     Pain Assessment:  Pain is controlled with current analgesics. Medication(s) being used: narcotic analgesics including Percocet. Anxiety:  fatigue, shortness of breath                          Dyspnea:  acute dyspnea and chronic dyspnea                          Fatigue:  Tiredness and weakness    We feel the patient symptoms are being controlled. her current regimen is reviewed by myself and discussed with the staff. Spiritual care consulted for POA paperwork    2- Goals of care evaluation   The patient goals of care are improve or maintain function/quality of life, accomplish a particular personal goal, spiritual needs, remain at home, strengthening relationships, preserve independence/autonomy/control and support for family/caregiver   Goals of care discussed with:    [x] Patient independently    [] Patient and Family    [] Family or Healthcare DPOA independently    [] Unable to discuss with patient, family/DPOA not present    3- Code Status  Full Code    4- Other recommendations   - We will follow-up on POA paperwork for  - We will follow-up with code status discussion  - We will continue to provide comfort and support to the patient and the family  Please call with any palliative questions or concerns. Palliative Care Team is available via perfect serve or via phone. Palliative Care will continue to follow Ms. Velásquez's care as needed. This note has been dictated by dragon, typing errors may be a possibility.     Thank you for allowing Palliative Care to participate in the care of Ms. Velásquez .     Electronically signed by   Vaishali Cisneros MD  Palliative Care Team  on 9/17/2018 at 11:20 AM    Palliative care office: [phone]

## 2018-09-17 NOTE — PROGRESS NOTES
Volume  (Per IBW)   []  Greater than or equal to 15ml/Kg []  less than 15ml/Kg []  less than 15ml/Kg   Surgery within last 2 weeks []  None or general   []  Abdominal or thoracic surgery  []  Abdominal or thoracic   Chronic Pulmonary Historyre []  No []  Yes []  Yes     []  Secretion Management Assessment  Score 1 2 3   Bilateral Breath Sounds   []  Occasional Rhonchi []  Scattered Rhonchi []  Course Rhonchi and/or poor aeration   Sputum    []  Small amount of thin secretions []  Moderate amount of viscous secretions []  Copius, Viscious Yellow/ Secretions   CXR as reported by physician []  clear  []  Unavailable []  Infiltrates and/or consolidation  []  Unavailable []  Mucus Plugging and or lobar consolidation  []  Unavailable   Cough []  Strong, productive cough []  Weak productive cough []  No cough or weak non-productive cough   CECILIO OLIVARES  8:12 AM                            FEMALE                                  MALE                            FEV1 Predicted Normal Values                        FEV1 Predicted Normal Values          Age                                     Height in Feet and Inches       Age                                     Height in Feet and Inches       4' 11\" 5' 1\" 5' 3\" 5' 5\" 5' 7\" 5' 9\" 5' 11\" 6' 1\"  4' 11\" 5' 1\" 5' 3\" 5' 5\" 5' 7\" 5' 9\" 5' 11\" 6' 1\"   42 - 45 2.49 2.66 2.84 3.03 3.22 3.42 3.62 3.83 42 - 45 2.82 3.03 3.26 3.49 3.72 3.96 4.22 4.47   46 - 49 2.40 2.57 2.76 2.94 3.14 3.33 3.54 3.75 46 - 49 2.70 2.92 3.14 3.37 3.61 3.85 4.10 4.36   50 - 53 2.31 2.48 2.66 2.85 3.04 3.24 3.45 3.66 50 - 53 2.58 2.80 3.02 3.25 3.49 3.73 3.98 4.24   54 - 57 2.21 2.38 2.57 2.75 2.95 3.14 3.35 3.56 54 - 57 2.46 2.67 2.89 3.12 3.36 3.60 3.85 4.11   58 - 61 2.10 2.28 2.46 2.65 2.84 3.04 3.24 3.45 58 - 61 2.32 2.54 2.76 2.99 3.23 3.47 3.72 3.98   62 - 65 1.99 2.17 2.35 2.54 2.73 2.93 3.13 3.34 62 - 65 2.19 2.40 2.62 2.85 3.09 3.33 3.58 3.84   66 - 69 1.88 2.05 2.23 2.42 2.61 2.81 3.02 3.23 66 - 69 2.04 2.26 2.48 2.71 2.95 3.19 3.44 3.70   70+ 1.82 1.99 2.17 2.36 2.55 2.75 2.95 3.16 70+ 1.97 2.19 2.41 2.64 2.87 3.12 3.37 3.62             Predicted Peak Expiratory Flow Rate                                       Height (in)  Female       Height (in) Male           Age 64 63 56 57 57 79 76 71 Age            21 344 357 372 387 402 417 432 446  60 62 64 66 68 70 72 74 76   25 337 352 366 381 396 411 426 441 25 447 476 [phone] [phone]   30 329 344 359 374 389 404 419 434 30 437 466 494 523 [phone] 667   35 322 337 351 366 381 396 411 426 35 426 455 484 512 541 [phone]   40 314 329 344 359 374 389 404 419 40 416 445 473 502 531 559 588 617 647   45 307 322 336 351 366 381 396 411 45 405 434 463 491 520 549 577 606 636   50 299 314 329 344 359 374 389 404 50 395 424 452 481 510 538 567 596 625   55 292 307 321 336 351 366 381 396 55 384 413 442 470 499 528 556 585 615   60 284 299 314 329 344 359 374 389 60 374 403 431 460 489 517 546 575 605   65 277 292 306 321 336 351 366 381 65 363 392 421 449 478 507 535 564 594   70 269 284 299 314 329 344 359 374 70 353 382 410 439 468 496 525 554 583   75 261 274 289 305 319 334 348 364 75 344 372 400 429 458 487 515 544 573   80 253 266 282 296 312 327 342 356 80 335 362 390 419 448 476 [phone]

## 2018-09-17 NOTE — PROGRESS NOTES
950 ml   Net             -441 ml     Physical Exam   Constitutional: She is oriented to person, place, and time. She appears well-developed and well-nourished. No distress. HENT:   Head: Normocephalic and atraumatic. Mouth/Throat: Oropharynx is clear and moist. No oropharyngeal exudate. Eyes: Pupils are equal, round, and reactive to light. Conjunctivae and EOM are normal. No scleral icterus. Neck: Normal range of motion. Neck supple. No JVD present. No thyromegaly present. Cardiovascular: Regular rhythm, normal heart sounds and intact distal pulses. No murmur heard. Pulmonary/Chest: Effort normal. No respiratory distress. She has wheezes (B/L mild expiratory). Abdominal: Soft. Bowel sounds are normal. She exhibits no distension and no mass. There is no tenderness. Musculoskeletal: Normal range of motion. She exhibits edema (mild b/l LE). She exhibits no tenderness. Lymphadenopathy:     She has no cervical adenopathy. Neurological: She is alert and oriented to person, place, and time. No cranial nerve deficit. Skin: Skin is warm. Psychiatric: She has a normal mood and affect.  Her behavior is normal.       Medications:      isosorbide dinitrate  20 mg Oral TID    spironolactone  25 mg Oral Daily    furosemide  40 mg Intravenous BID    amLODIPine  10 mg Oral Daily    atorvastatin  40 mg Oral Nightly    famotidine  20 mg Oral BID    ferrous sulfate  325 mg Oral BID WC    fluticasone  1 spray Nasal Daily    hydrALAZINE  75 mg Oral 3 times per day    lisinopril  20 mg Oral Daily    cetirizine  10 mg Oral Daily    tiotropium  18 mcg Inhalation Daily    sodium chloride flush  10 mL Intravenous 2 times per day    enoxaparin  40 mg Subcutaneous Daily    mometasone-formoterol  2 puff Inhalation BID    gabapentin  800 mg Oral TID       Diagnostic Labs and Imaging    CBC:   Recent Labs      09/15/18   1657  09/16/18   0633  09/17/18   0731   WBC  8.6  6.2  6.3   RBC  2.96* 3.34*  3.05*   HGB  8.0*  9.0*  8.3*   HCT  27.7*  31.5*  29.3*   MCV  93.6  94.3  96.1   RDW  13.1  13.2  13.2   PLT  252  229  235     BMP:   Recent Labs      09/15/18   1657  09/16/18   0633  09/17/18   0731   NA  142  140  143   K  4.0  3.7  3.6*   CL  100  97*  97*   CO2  31  29  33*   BUN  22*  17  26*   CREATININE  0.82  0.73  1.02*     BNP: No results for input(s): BNP in the last 72 hours. PT/INR: No results for input(s): PROTIME, INR in the last 72 hours. APTT: No results for input(s): APTT in the last 72 hours. CARDIAC ENZYMES:   Recent Labs      09/15/18   1649  09/15/18   2005   TROPONINI  0.00  0.01     FASTING LIPID PANEL:  Lab Results   Component Value Date    TRIG 102 02/15/2018     LIVER PROFILE: No results for input(s): AST, ALT, ALB, BILIDIR, BILITOT, ALKPHOS in the last 72 hours. Xr Chest Portable    Result Date: 9/15/2018  EXAMINATION: SINGLE XRAY VIEW OF THE CHEST 9/15/2018 4:55 pm COMPARISON: February 19, 2018 and February 17, 2018  Diminished penetration through soft tissue without convincing evidence for acute airspace disease. Cardiac silhouette enlargement is again noted. Assessment and Plan:   Principal Problem:    Acute on chronic diastolic CHF (congestive heart failure) (HCC)  Active Problems: Morbid obesity (HCC)    Obesity hypoventilation syndrome (HCC)    SOB (shortness of breath)    COPD (chronic obstructive pulmonary disease) (HCC)    Status post tracheostomy ([facility] [address]    Chronic respiratory failure (HCC)  Resolved Problems:    * No resolved hospital problems. *    ASSESSMENT and PLAN:     1.  Acute on chr cor pulmonale   STEPH   Morbid obesity   Previous trach      - ECHO done in 01/2018 shows LVEF 65% with hyperdynamic systolic function and RV dilation  - Cardiac diet, Fluids restriction at 1500mL/day and Na <2g/day  - Lasix IV infusion stopped and start IV Lasix 40mg BID.  - Spironolactone started on 09/17/2018  - BiPAP as necessary, at night  - Maintain 3L oxygen transcription, some errors in transcription may have occurred.

## 2018-09-17 NOTE — PROGRESS NOTES
[facility]  Occupational Therapy Not Seen Note    DATE: 2018  Name: Rubina Cuevas  : 1975  MRN: [id number]    Patient not available for Occupational Therapy due to:    [] Testing:    [] Hemodialysis    [] Blood Transfusion in Progress    []Refusal by Patient:    [] Surgery/Procedure:    [] Strict Bedrest    [] Sedation    [] Spine Precautions     [] Pt being transferred to palliative care at this time. Spoke with pt/family and OT services to be defered. [] Pt independent with functional mobility and functional tasks. Pt with no OT acute care needs at this time, will defer OT eval.    [x] Other: Just completed PT (up to chair, SOB) then assisted back to bed by RN to assist with dressing and catheter change.  Pt SOB, requesting recheck later as able     Next Scheduled Treatment: Re-check 2018    Signature: HANDY Hutson/MARION

## 2018-09-18 ENCOUNTER — APPOINTMENT (OUTPATIENT)
Dept: GENERAL RADIOLOGY | Age: 43
DRG: 194 | End: 2018-09-18
Payer: MEDICARE

## 2018-09-18 LAB
ABSOLUTE EOS #: 0.2 K/UL (ref 0–0.44)
ABSOLUTE IMMATURE GRANULOCYTE: 0.05 K/UL (ref 0–0.3)
ABSOLUTE LYMPH #: 2.21 K/UL (ref 1.1–3.7)
ABSOLUTE MONO #: 0.75 K/UL (ref 0.1–1.2)
ANION GAP SERPL CALCULATED.3IONS-SCNC: 15 MMOL/L (ref 9–17)
BASOPHILS # BLD: 0 % (ref 0–2)
BASOPHILS ABSOLUTE: <0.03 K/UL (ref 0–0.2)
BUN BLDV-MCNC: 35 MG/DL (ref 6–20)
BUN/CREAT BLD: ABNORMAL (ref 9–20)
CALCIUM SERPL-MCNC: 8.4 MG/DL (ref 8.6–10.4)
CHLORIDE BLD-SCNC: 98 MMOL/L (ref 98–107)
CO2: 32 MMOL/L (ref 20–31)
CREAT SERPL-MCNC: 1.05 MG/DL (ref 0.5–0.9)
DIFFERENTIAL TYPE: ABNORMAL
EOSINOPHILS RELATIVE PERCENT: 2 % (ref 1–4)
GFR AFRICAN AMERICAN: >60 ML/MIN
GFR NON-AFRICAN AMERICAN: 57 ML/MIN
GFR SERPL CREATININE-BSD FRML MDRD: ABNORMAL ML/MIN/{1.73_M2}
GFR SERPL CREATININE-BSD FRML MDRD: ABNORMAL ML/MIN/{1.73_M2}
GLUCOSE BLD-MCNC: 101 MG/DL (ref 70–99)
HCT VFR BLD CALC: 29.4 % (ref 36.3–47.1)
HEMOGLOBIN: 8.6 G/DL (ref 11.9–15.1)
IMMATURE GRANULOCYTES: 1 %
LYMPHOCYTES # BLD: 26 % (ref 24–43)
MAGNESIUM: 2 MG/DL (ref 1.6–2.6)
MCH RBC QN AUTO: 27.4 PG (ref 25.2–33.5)
MCHC RBC AUTO-ENTMCNC: 29.3 G/DL (ref 28.4–34.8)
MCV RBC AUTO: 93.6 FL (ref 82.6–102.9)
MONOCYTES # BLD: 9 % (ref 3–12)
NRBC AUTOMATED: 0 PER 100 WBC
PDW BLD-RTO: 13.5 % (ref 11.8–14.4)
PLATELET # BLD: 269 K/UL (ref 138–453)
PLATELET ESTIMATE: ABNORMAL
PMV BLD AUTO: 10.1 FL (ref 8.1–13.5)
POTASSIUM SERPL-SCNC: 3.7 MMOL/L (ref 3.7–5.3)
RBC # BLD: 3.14 M/UL (ref 3.95–5.11)
RBC # BLD: ABNORMAL 10*6/UL
SEG NEUTROPHILS: 62 % (ref 36–65)
SEGMENTED NEUTROPHILS ABSOLUTE COUNT: 5.26 K/UL (ref 1.5–8.1)
SODIUM BLD-SCNC: 145 MMOL/L (ref 135–144)
WBC # BLD: 8.5 K/UL (ref 3.5–11.3)
WBC # BLD: ABNORMAL 10*3/UL

## 2018-09-18 PROCEDURE — 71046 X-RAY EXAM CHEST 2 VIEWS: CPT

## 2018-09-18 PROCEDURE — 94762 N-INVAS EAR/PLS OXIMTRY CONT: CPT

## 2018-09-18 PROCEDURE — 6360000002 HC RX W HCPCS: Performed by: STUDENT IN AN ORGANIZED HEALTH CARE EDUCATION/TRAINING PROGRAM

## 2018-09-18 PROCEDURE — 83735 ASSAY OF MAGNESIUM: CPT

## 2018-09-18 PROCEDURE — 6370000000 HC RX 637 (ALT 250 FOR IP): Performed by: NURSE PRACTITIONER

## 2018-09-18 PROCEDURE — 94660 CPAP INITIATION&MGMT: CPT

## 2018-09-18 PROCEDURE — 2580000003 HC RX 258: Performed by: STUDENT IN AN ORGANIZED HEALTH CARE EDUCATION/TRAINING PROGRAM

## 2018-09-18 PROCEDURE — 2060000000 HC ICU INTERMEDIATE R&B

## 2018-09-18 PROCEDURE — 94640 AIRWAY INHALATION TREATMENT: CPT

## 2018-09-18 PROCEDURE — 85025 COMPLETE CBC W/AUTO DIFF WBC: CPT

## 2018-09-18 PROCEDURE — 80048 BASIC METABOLIC PNL TOTAL CA: CPT

## 2018-09-18 PROCEDURE — 36415 COLL VENOUS BLD VENIPUNCTURE: CPT

## 2018-09-18 PROCEDURE — 6360000002 HC RX W HCPCS: Performed by: INTERNAL MEDICINE

## 2018-09-18 PROCEDURE — 6370000000 HC RX 637 (ALT 250 FOR IP): Performed by: STUDENT IN AN ORGANIZED HEALTH CARE EDUCATION/TRAINING PROGRAM

## 2018-09-18 PROCEDURE — 6360000002 HC RX W HCPCS: Performed by: HOSPITALIST

## 2018-09-18 PROCEDURE — 99232 SBSQ HOSP IP/OBS MODERATE 35: CPT | Performed by: FAMILY MEDICINE

## 2018-09-18 PROCEDURE — 99232 SBSQ HOSP IP/OBS MODERATE 35: CPT | Performed by: INTERNAL MEDICINE

## 2018-09-18 RX ORDER — LORAZEPAM 2 MG/ML
0.5 INJECTION INTRAMUSCULAR ONCE
Status: COMPLETED | OUTPATIENT
Start: 2018-09-18 | End: 2018-09-18

## 2018-09-18 RX ORDER — SERTRALINE HYDROCHLORIDE 25 MG/1
75 TABLET, FILM COATED ORAL DAILY
COMMUNITY
End: 2019-10-28 | Stop reason: SDUPTHER

## 2018-09-18 RX ADMIN — FAMOTIDINE 20 MG: 20 TABLET, FILM COATED ORAL at 08:57

## 2018-09-18 RX ADMIN — ACETAMINOPHEN 325 MG: 325 TABLET ORAL at 13:43

## 2018-09-18 RX ADMIN — HYDRALAZINE HYDROCHLORIDE 75 MG: 50 TABLET, FILM COATED ORAL at 21:09

## 2018-09-18 RX ADMIN — DESMOPRESSIN ACETATE 40 MG: 0.2 TABLET ORAL at 21:10

## 2018-09-18 RX ADMIN — GABAPENTIN 800 MG: 800 TABLET ORAL at 13:45

## 2018-09-18 RX ADMIN — OXYCODONE HYDROCHLORIDE AND ACETAMINOPHEN 1 TABLET: 5; 325 TABLET ORAL at 18:00

## 2018-09-18 RX ADMIN — MOMETASONE FUROATE AND FORMOTEROL FUMARATE DIHYDRATE 2 PUFF: 100; 5 AEROSOL RESPIRATORY (INHALATION) at 08:39

## 2018-09-18 RX ADMIN — GABAPENTIN 800 MG: 800 TABLET ORAL at 08:57

## 2018-09-18 RX ADMIN — ENOXAPARIN SODIUM 40 MG: 40 INJECTION SUBCUTANEOUS at 08:57

## 2018-09-18 RX ADMIN — FUROSEMIDE 40 MG: 10 INJECTION, SOLUTION INTRAMUSCULAR; INTRAVENOUS at 08:57

## 2018-09-18 RX ADMIN — ISOSORBIDE DINITRATE 20 MG: 10 TABLET ORAL at 21:10

## 2018-09-18 RX ADMIN — GABAPENTIN 800 MG: 800 TABLET ORAL at 21:10

## 2018-09-18 RX ADMIN — FERROUS SULFATE TAB EC 325 MG (65 MG FE EQUIVALENT) 325 MG: 325 (65 FE) TABLET DELAYED RESPONSE at 08:57

## 2018-09-18 RX ADMIN — Medication 10 ML: at 08:58

## 2018-09-18 RX ADMIN — OXYCODONE HYDROCHLORIDE AND ACETAMINOPHEN 1 TABLET: 5; 325 TABLET ORAL at 08:45

## 2018-09-18 RX ADMIN — OXYCODONE HYDROCHLORIDE AND ACETAMINOPHEN 1 TABLET: 5; 325 TABLET ORAL at 13:43

## 2018-09-18 RX ADMIN — FAMOTIDINE 20 MG: 20 TABLET, FILM COATED ORAL at 21:09

## 2018-09-18 RX ADMIN — ISOSORBIDE DINITRATE 20 MG: 10 TABLET ORAL at 13:45

## 2018-09-18 RX ADMIN — MOMETASONE FUROATE AND FORMOTEROL FUMARATE DIHYDRATE 2 PUFF: 100; 5 AEROSOL RESPIRATORY (INHALATION) at 21:23

## 2018-09-18 RX ADMIN — FLUTICASONE PROPIONATE 1 SPRAY: 50 SPRAY, METERED NASAL at 08:57

## 2018-09-18 RX ADMIN — TIOTROPIUM BROMIDE 18 MCG: 18 CAPSULE ORAL; RESPIRATORY (INHALATION) at 08:39

## 2018-09-18 RX ADMIN — ALBUTEROL SULFATE 2.5 MG: 2.5 SOLUTION RESPIRATORY (INHALATION) at 11:58

## 2018-09-18 RX ADMIN — LORAZEPAM 0.5 MG: 2 INJECTION INTRAMUSCULAR; INTRAVENOUS at 18:00

## 2018-09-18 RX ADMIN — ACETAMINOPHEN 650 MG: 325 TABLET ORAL at 00:44

## 2018-09-18 RX ADMIN — FERROUS SULFATE TAB EC 325 MG (65 MG FE EQUIVALENT) 325 MG: 325 (65 FE) TABLET DELAYED RESPONSE at 18:00

## 2018-09-18 RX ADMIN — CETIRIZINE HYDROCHLORIDE 10 MG: 10 TABLET ORAL at 21:09

## 2018-09-18 ASSESSMENT — ENCOUNTER SYMPTOMS
DIARRHEA: 0
NAUSEA: 0
HEARTBURN: 0
SORE THROAT: 0
PHOTOPHOBIA: 0
SPUTUM PRODUCTION: 1
ABDOMINAL PAIN: 0
WHEEZING: 1
VOMITING: 0
HEMOPTYSIS: 0
COUGH: 1
SHORTNESS OF BREATH: 1
BLURRED VISION: 0
ORTHOPNEA: 1
CONSTIPATION: 0
BLOOD IN STOOL: 0

## 2018-09-18 ASSESSMENT — PAIN SCALES - GENERAL
PAINLEVEL_OUTOF10: 6
PAINLEVEL_OUTOF10: 6
PAINLEVEL_OUTOF10: 8
PAINLEVEL_OUTOF10: 8

## 2018-09-18 NOTE — CONSULTS
Direct consult to Dr. Marilu Saha received for bariatric evaluation. Due to complex nature and need for long discussion these consults are best completed in outpt/office setting. Would love to see patient outpt. Will put office contact info in patient's discharge information. Please reconsult/call with any additional questions or concerns. Thank you!!     Electronically signed by Krzysztof Garcia DO on 9/18/2018 at 5:31 PM

## 2018-09-18 NOTE — PROGRESS NOTES
[facility]  Occupational Therapy Not Seen Note    DATE: 2018  Name: Kevin Galdamez  : 1975  MRN: [id number]    Patient not available for Occupational Therapy due to:    [x] Testing:    [] Hemodialysis    [] Blood Transfusion in Progress    []Refusal by Patient:    [] Surgery/Procedure:    [] Strict Bedrest    [] Sedation    [] Spine Precautions     [] Pt being transferred to palliative care at this time. Spoke with pt/family and OT services to be defered. [] Pt independent with functional mobility and functional tasks.  Pt with no OT acute care needs at this time, will defer OT eval.    [] Other    Next Scheduled Treatment: Re-check 2018    Signature: Chelsie Pierre, OTR/L

## 2018-09-18 NOTE — PROGRESS NOTES
Palliative Care Progress Note    NAME:  Carol Mortensen  MEDICAL RECORD NUMBER:  [id number]  AGE: 37 y.o. GENDER: female  : 1975  TODAY'S DATE:  2018    Reason for Consult:  goals of care  History of Present Illness     The patient is a 37 y.o. Non-/non  female who presents with Congestive Heart Failure (SOB. pulmonary hypertension. was on lasix switched to diff diuretic and now SOB weight gain)      Referred to Palliative Care by  [x] Physician   [] Nursing  [] Family Request   [] Other:       She was admitted to the Internal Medicine service for Acute on chronic diastolic CHF (congestive heart failure) ([facility] [address] [I50.33]. Her hospital course has been associated with shortness of breath.  The patient has a complicated medical history and has been hospitalized since 9/15/2018  4:12 PM.    OVERNIGHT EVENTS:  - No acute events overnight  - Patient stating shortness of breath improving  - Patient complains of some dizziness and headache     BP (!) 112/52   Pulse 83   Temp 97.9 °F (36.6 °C) (Oral)   Resp 18   Ht 5' (1.524 m)   Wt (!) 372 lb 3.2 oz (168.8 kg)   SpO2 (!) 87%   BMI 72.69 kg/m²     Assessment        REVIEW OF SYSTEMS    []   UNABLE TO OBTAIN:     Constitutional:  []   Chills   [x]  Fatigue   []  Fevers   []  Malaise   []  Weight loss   [] Other:     Respiratory:   [x]  Cough    [x]  Shortness of breath    []  Chest pain    [] Other:     Cardiovascular:   []  Chest pain  [x]  Dyspnea    [x]  Exertional chest pressure/discomfort     [x] Fatigue      []  Palpitations    []  Syncope   [] Other:     Gastrointestinal:   []  Abdominal pain   []  Constipation    []  Diarrhea    []   Dysphagia   []  Reflux             []  Vomiting   [] Other:     Genitourinary:  []  Dysuria     []  Frequency   []  Hematuria   [] Nocturia   []  Urinary incontinence   [] Other:     Musculoskeletal:   [] Back pain    []  Muscle weakness   []  Myalgias    []  Neck pain   []  Stiff joints   []  Other: Behavioral/Psych:   [] Anxiety    []  Depression     []  Mood swings   [] Other:     PHYSICAL ASSESSMENT:     General: [x]  Oriented x3      [] well appearing      [] Intubated      [] ill appearing      [] Other:    Mental Status: [x] normal mental status exam      [] drowsy      [] Confused      [] Other:     Cardiovascular: [x]  Regular rate/rhythm      [] Arrhythmia      [] Other:     Chest: [x] Effort normal      [] lungs clear      [] respiratory distress      [] Tachypnea      []  Other: Decreased breath sounds    Abdomen: [x] Soft/non-tender      [x]  Normal appearance      [] Distended      [] Ascites      [] Other:    Neurological: [x] Normal Speech      [] Normal Sensation      []  Deficits present:      Extremity:  [x] normal skin color/temp      [] clubbing/cyanosis      [x]  + edema      [] Other:     Palliative Performance Scale:  __x_60%  Ambulation reduced; Significant disease; Can't do hobbies/housework; intake normal or reduced; occasional assist; LOC full/confusion  ___50%  Mainly sit/lie; Extensive disease; Can't do any work; Considerable assist; intake normal or reduced; LOC full/confusion  ___40%  Mainly in bed; Extensive disease; Mainly assist; intake normal or reduced; LOC full/confusion   ___30%  Bed Bound; Extensive disease; Total care; intake reduced; LOCfull/confusion  ___20%  Bed Bound; Extensive disease; Total care; intake minimal; Drowsy/coma  ___10%  Bed Bound; Extensive disease;  Total care; Mouth care only; Drowsy/coma  ___0       Death      Plan      Palliative Interaction:    - Patient seen today to maintain continuity of care    - Patient's current medical conditions were discussed    - Patient told that she completed her POA paperwork and has named Denia as her POA for health    - I informed patient that I have reviewed her POA paperwork    - I discussed different types of code status with patient    - Patient stated that she clearly understands about the different types of Palliative Care will continue to follow Ms. Velásquez's care as needed. The note has been dictated by dragon, typing errors may be a possibility     Thank you for allowing Palliative Care to participate in the care of Ms. Velásquez .        Electronically signed by   Jesse Kelly MD  Palliative Care Team  on 9/18/2018 at 12:38 PM    Palliative care office: [phone]

## 2018-09-18 NOTE — PROGRESS NOTES
Physical Therapy  DATE: 2018    NAME: Charles Luna  MRN: [id number]   : 1975    Patient not seen this date for Physical Therapy due to:  [] Blood transfusion in progress  [] Hemodialysis  [x]  Patient Declined  -  Pt states she's tired from testing earlier and wants to sleep. RN informed. [] Spine Precautions   [] Strict Bedrest  [] Surgery/ Procedure  [] Testing      [] Other        [] PT being discontinued at this time. Patient independent. No further needs. [] PT being discontinued at this time as the patient has been transferred to palliative care. No further needs.     Malorie Prasad, PTA

## 2018-09-18 NOTE — PLAN OF CARE
Problem: Pain:  Goal: Control of acute pain  Control of acute pain   Outcome: Ongoing    Goal: Control of chronic pain  Control of chronic pain   Outcome: Ongoing      Problem: Falls - Risk of:  Goal: Absence of physical injury  Absence of physical injury   Outcome: Ongoing

## 2018-09-18 NOTE — PROGRESS NOTES
[facility]   Progress Note                   Date:   9/18/2018  Patient name: Jorge Hutchins  Date of admission:  9/15/2018  4:12 PM  MRN:   [id number]  YOB: 1975  PCP: Santo Snyder DO    Reason for Admission:  Acute on Chronic diastolic CHF    Subjective: There were no acute events overnight, remained hemodynamically stable, denies chest pain, or palpitations. PT explains that her SOB has gotten slightly better but still has some dyspnea on exertion when walking to the bathroom or getting up in the room. Pt denies hemoptysis but does have a cough with sputum production. Pt complains of some dizziness and headache but denies LOC and syncope. Pt initially presented with extreme SOB with decreasing activity level over a period of two weeks to the point where she could no longer get out of bed or breathe comfortably. SHe also had mentioned a 40 lb weight gain. PT is alert, and well oriented to time, place, and person. Pt blood pressure was low over night  90's/40s but this morning was 124/49. Will continue to monitor. Chest Xray was done this AM results showed Cardiomegaly with mild pulmonary vascular congestion- similar to prior exam. No definite effusions are seen.       Medications:   Scheduled Meds:   isosorbide dinitrate  20 mg Oral TID    spironolactone  25 mg Oral Daily    furosemide  40 mg Intravenous BID    amLODIPine  10 mg Oral Daily    atorvastatin  40 mg Oral Nightly    famotidine  20 mg Oral BID    ferrous sulfate  325 mg Oral BID WC    fluticasone  1 spray Nasal Daily    hydrALAZINE  75 mg Oral 3 times per day    lisinopril  20 mg Oral Daily    cetirizine  10 mg Oral Daily    tiotropium  18 mcg Inhalation Daily    sodium chloride flush  10 mL Intravenous 2 times per day    enoxaparin  40 mg Subcutaneous Daily    mometasone-formoterol  2 puff Inhalation BID    gabapentin  800 mg Oral TID       Continuous Infusions:    CBC:   Recent Labs 18   7024  18   0731  18   0727   WBC  6.2  6.3  8.5   HGB  9.0*  8.3*  8.6*   PLT  229  235  269     BMP:    Recent Labs      18   0633  18   0731  18   0727   NA  140  143  145*   K  3.7  3.6*  3.7   CL  97*  97*  98   CO2  29  33*  32*   BUN  17  26*  35*   CREATININE  0.73  1.02*  1.05*   GLUCOSE  97  97  101*     Hepatic: No results for input(s): AST, ALT, ALB, BILITOT, ALKPHOS in the last 72 hours. Troponin:   Recent Labs      09/15/18   1649  09/15/18   2005   TROPONINI  0.00  0.01     BNP: No results for input(s): BNP in the last 72 hours. Lipids: No results for input(s): CHOL, HDL in the last 72 hours. Invalid input(s): LDLCALCU  INR: No results for input(s): INR in the last 72 hours. Objective:   Vitals: BP (!) 124/49   Pulse 70   Temp 97.9 °F (36.6 °C) (Oral)   Resp 18   Ht 5' (1.524 m)   Wt (!) 372 lb 3.2 oz (168.8 kg)   SpO2 100%   BMI 72.69 kg/m²     General appearance: awake, alert, in no apparent respiratory distress   HEENT: Head: Normocephalic, no lesions, without obvious abnormality  Neck: no JVD  Lungs: Wheezes bilaterally, no basilar rales, no wheezing   Heart: regular rate and rhythm, S1, S2 normal, no murmur, click, rub or gallop  Abdomen: soft, non-tender; bowel sounds normal  Extremities: Mild Bilateral Edema in Lower Extremity   Neurologic: Mental status: Alert, oriented. Motor and sensory not done. EK/15/2018  Normal sinus rhythm  Minimal voltage criteria for LVH, may be normal variant  Borderline ECG  When compared with ECG of 2018 23:20,  No significant change was found    2018  Normal sinus rhythm  Normal ECG      Echocardiogram:  2018  Technically difficult study. All segments not well seen. No comment can be  made regarding specific wall motion. LV chamber dimension is normal. Systolic function appears to be hyperdynamic  with an estimated EF of 65%.   Right ventricular dilatation with normal systolic

## 2018-09-18 NOTE — PROGRESS NOTES
signed by Thomas Lopez MD on   9/18/18 at 9:56 PM    Please note that this chart was generated using voice recognition Dragon dictation software. Although every effort was made to ensure the accuracy of this automated transcription, some errors in transcription may have occurred.

## 2018-09-18 NOTE — PLAN OF CARE
Problem: Pain:  Goal: Pain level will decrease  Pain level will decrease   Outcome: Ongoing  Patient currently denies any pain. Patient encouraged to call out when having pain. Pain medication administered as ordered. Will continue to monitor. Problem: Falls - Risk of:  Goal: Will remain free from falls  Will remain free from falls   Outcome: Ongoing  Patient calls out appropriately, nonskid socks on, side rails up, call light within reach. No falls during shift. Will continue to monitor.

## 2018-09-19 LAB
ABSOLUTE EOS #: 0.19 K/UL (ref 0–0.44)
ABSOLUTE IMMATURE GRANULOCYTE: 0.04 K/UL (ref 0–0.3)
ABSOLUTE LYMPH #: 1.81 K/UL (ref 1.1–3.7)
ABSOLUTE MONO #: 0.67 K/UL (ref 0.1–1.2)
ANION GAP SERPL CALCULATED.3IONS-SCNC: 11 MMOL/L (ref 9–17)
BASOPHILS # BLD: 0 % (ref 0–2)
BASOPHILS ABSOLUTE: <0.03 K/UL (ref 0–0.2)
BUN BLDV-MCNC: 29 MG/DL (ref 6–20)
BUN/CREAT BLD: ABNORMAL (ref 9–20)
CALCIUM SERPL-MCNC: 8.6 MG/DL (ref 8.6–10.4)
CHLORIDE BLD-SCNC: 95 MMOL/L (ref 98–107)
CO2: 32 MMOL/L (ref 20–31)
CREAT SERPL-MCNC: 0.8 MG/DL (ref 0.5–0.9)
DIFFERENTIAL TYPE: ABNORMAL
EOSINOPHILS RELATIVE PERCENT: 3 % (ref 1–4)
GFR AFRICAN AMERICAN: >60 ML/MIN
GFR NON-AFRICAN AMERICAN: >60 ML/MIN
GFR SERPL CREATININE-BSD FRML MDRD: ABNORMAL ML/MIN/{1.73_M2}
GFR SERPL CREATININE-BSD FRML MDRD: ABNORMAL ML/MIN/{1.73_M2}
GLUCOSE BLD-MCNC: 113 MG/DL (ref 70–99)
HCT VFR BLD CALC: 28.1 % (ref 36.3–47.1)
HEMOGLOBIN: 8.1 G/DL (ref 11.9–15.1)
IMMATURE GRANULOCYTES: 1 %
LV EF: 65 %
LVEF MODALITY: NORMAL
LYMPHOCYTES # BLD: 26 % (ref 24–43)
MAGNESIUM: 2.2 MG/DL (ref 1.6–2.6)
MCH RBC QN AUTO: 27.2 PG (ref 25.2–33.5)
MCHC RBC AUTO-ENTMCNC: 28.8 G/DL (ref 28.4–34.8)
MCV RBC AUTO: 94.3 FL (ref 82.6–102.9)
MONOCYTES # BLD: 10 % (ref 3–12)
NRBC AUTOMATED: 0 PER 100 WBC
PDW BLD-RTO: 13.3 % (ref 11.8–14.4)
PLATELET # BLD: 235 K/UL (ref 138–453)
PLATELET ESTIMATE: ABNORMAL
PMV BLD AUTO: 10 FL (ref 8.1–13.5)
POTASSIUM SERPL-SCNC: 3.5 MMOL/L (ref 3.7–5.3)
RBC # BLD: 2.98 M/UL (ref 3.95–5.11)
RBC # BLD: ABNORMAL 10*6/UL
SEG NEUTROPHILS: 60 % (ref 36–65)
SEGMENTED NEUTROPHILS ABSOLUTE COUNT: 4.19 K/UL (ref 1.5–8.1)
SODIUM BLD-SCNC: 138 MMOL/L (ref 135–144)
WBC # BLD: 6.9 K/UL (ref 3.5–11.3)
WBC # BLD: ABNORMAL 10*3/UL

## 2018-09-19 PROCEDURE — 94640 AIRWAY INHALATION TREATMENT: CPT

## 2018-09-19 PROCEDURE — 6370000000 HC RX 637 (ALT 250 FOR IP): Performed by: STUDENT IN AN ORGANIZED HEALTH CARE EDUCATION/TRAINING PROGRAM

## 2018-09-19 PROCEDURE — 97166 OT EVAL MOD COMPLEX 45 MIN: CPT

## 2018-09-19 PROCEDURE — 6360000002 HC RX W HCPCS: Performed by: STUDENT IN AN ORGANIZED HEALTH CARE EDUCATION/TRAINING PROGRAM

## 2018-09-19 PROCEDURE — 80048 BASIC METABOLIC PNL TOTAL CA: CPT

## 2018-09-19 PROCEDURE — G8988 SELF CARE GOAL STATUS: HCPCS

## 2018-09-19 PROCEDURE — 93306 TTE W/DOPPLER COMPLETE: CPT

## 2018-09-19 PROCEDURE — 85025 COMPLETE CBC W/AUTO DIFF WBC: CPT

## 2018-09-19 PROCEDURE — 97530 THERAPEUTIC ACTIVITIES: CPT

## 2018-09-19 PROCEDURE — 6360000002 HC RX W HCPCS: Performed by: INTERNAL MEDICINE

## 2018-09-19 PROCEDURE — G8987 SELF CARE CURRENT STATUS: HCPCS

## 2018-09-19 PROCEDURE — 97116 GAIT TRAINING THERAPY: CPT

## 2018-09-19 PROCEDURE — 94660 CPAP INITIATION&MGMT: CPT

## 2018-09-19 PROCEDURE — 97535 SELF CARE MNGMENT TRAINING: CPT

## 2018-09-19 PROCEDURE — 2580000003 HC RX 258: Performed by: STUDENT IN AN ORGANIZED HEALTH CARE EDUCATION/TRAINING PROGRAM

## 2018-09-19 PROCEDURE — 36415 COLL VENOUS BLD VENIPUNCTURE: CPT

## 2018-09-19 PROCEDURE — 94762 N-INVAS EAR/PLS OXIMTRY CONT: CPT

## 2018-09-19 PROCEDURE — 6370000000 HC RX 637 (ALT 250 FOR IP): Performed by: NURSE PRACTITIONER

## 2018-09-19 PROCEDURE — 83735 ASSAY OF MAGNESIUM: CPT

## 2018-09-19 PROCEDURE — 99232 SBSQ HOSP IP/OBS MODERATE 35: CPT | Performed by: INTERNAL MEDICINE

## 2018-09-19 PROCEDURE — 2060000000 HC ICU INTERMEDIATE R&B

## 2018-09-19 RX ORDER — POTASSIUM CHLORIDE 20 MEQ/1
40 TABLET, EXTENDED RELEASE ORAL ONCE
Status: COMPLETED | OUTPATIENT
Start: 2018-09-19 | End: 2018-09-19

## 2018-09-19 RX ORDER — TORSEMIDE 20 MG/1
20 TABLET ORAL DAILY
Status: DISCONTINUED | OUTPATIENT
Start: 2018-09-19 | End: 2018-09-21 | Stop reason: HOSPADM

## 2018-09-19 RX ADMIN — SPIRONOLACTONE 25 MG: 25 TABLET ORAL at 08:52

## 2018-09-19 RX ADMIN — OXYCODONE HYDROCHLORIDE AND ACETAMINOPHEN 1 TABLET: 5; 325 TABLET ORAL at 00:02

## 2018-09-19 RX ADMIN — ISOSORBIDE DINITRATE 20 MG: 10 TABLET ORAL at 08:51

## 2018-09-19 RX ADMIN — GABAPENTIN 800 MG: 800 TABLET ORAL at 23:00

## 2018-09-19 RX ADMIN — POTASSIUM CHLORIDE 40 MEQ: 1500 TABLET, EXTENDED RELEASE ORAL at 12:10

## 2018-09-19 RX ADMIN — GABAPENTIN 800 MG: 800 TABLET ORAL at 08:52

## 2018-09-19 RX ADMIN — OXYCODONE HYDROCHLORIDE AND ACETAMINOPHEN 1 TABLET: 5; 325 TABLET ORAL at 08:51

## 2018-09-19 RX ADMIN — FERROUS SULFATE TAB EC 325 MG (65 MG FE EQUIVALENT) 325 MG: 325 (65 FE) TABLET DELAYED RESPONSE at 16:39

## 2018-09-19 RX ADMIN — CETIRIZINE HYDROCHLORIDE 10 MG: 10 TABLET ORAL at 23:00

## 2018-09-19 RX ADMIN — OXYCODONE HYDROCHLORIDE AND ACETAMINOPHEN 1 TABLET: 5; 325 TABLET ORAL at 21:23

## 2018-09-19 RX ADMIN — AMLODIPINE BESYLATE 10 MG: 10 TABLET ORAL at 08:51

## 2018-09-19 RX ADMIN — FERROUS SULFATE TAB EC 325 MG (65 MG FE EQUIVALENT) 325 MG: 325 (65 FE) TABLET DELAYED RESPONSE at 08:51

## 2018-09-19 RX ADMIN — ACETAMINOPHEN 650 MG: 325 TABLET ORAL at 19:55

## 2018-09-19 RX ADMIN — HYDRALAZINE HYDROCHLORIDE 75 MG: 50 TABLET, FILM COATED ORAL at 14:35

## 2018-09-19 RX ADMIN — FUROSEMIDE 40 MG: 10 INJECTION, SOLUTION INTRAMUSCULAR; INTRAVENOUS at 12:10

## 2018-09-19 RX ADMIN — DESMOPRESSIN ACETATE 40 MG: 0.2 TABLET ORAL at 23:00

## 2018-09-19 RX ADMIN — HYDRALAZINE HYDROCHLORIDE 75 MG: 50 TABLET, FILM COATED ORAL at 06:16

## 2018-09-19 RX ADMIN — MOMETASONE FUROATE AND FORMOTEROL FUMARATE DIHYDRATE 2 PUFF: 100; 5 AEROSOL RESPIRATORY (INHALATION) at 08:18

## 2018-09-19 RX ADMIN — TIOTROPIUM BROMIDE 18 MCG: 18 CAPSULE ORAL; RESPIRATORY (INHALATION) at 08:18

## 2018-09-19 RX ADMIN — FAMOTIDINE 20 MG: 20 TABLET, FILM COATED ORAL at 08:51

## 2018-09-19 RX ADMIN — OXYCODONE HYDROCHLORIDE AND ACETAMINOPHEN 1 TABLET: 5; 325 TABLET ORAL at 14:34

## 2018-09-19 RX ADMIN — GABAPENTIN 800 MG: 800 TABLET ORAL at 14:34

## 2018-09-19 RX ADMIN — FAMOTIDINE 20 MG: 20 TABLET, FILM COATED ORAL at 23:00

## 2018-09-19 RX ADMIN — LISINOPRIL 20 MG: 20 TABLET ORAL at 08:52

## 2018-09-19 RX ADMIN — Medication 10 ML: at 08:52

## 2018-09-19 RX ADMIN — FLUTICASONE PROPIONATE 1 SPRAY: 50 SPRAY, METERED NASAL at 12:09

## 2018-09-19 RX ADMIN — ENOXAPARIN SODIUM 40 MG: 40 INJECTION SUBCUTANEOUS at 08:51

## 2018-09-19 RX ADMIN — Medication 10 ML: at 22:59

## 2018-09-19 RX ADMIN — MOMETASONE FUROATE AND FORMOTEROL FUMARATE DIHYDRATE 2 PUFF: 100; 5 AEROSOL RESPIRATORY (INHALATION) at 21:12

## 2018-09-19 RX ADMIN — TORSEMIDE 20 MG: 20 TABLET ORAL at 16:39

## 2018-09-19 RX ADMIN — ISOSORBIDE DINITRATE 20 MG: 10 TABLET ORAL at 14:35

## 2018-09-19 RX ADMIN — ACETAMINOPHEN 650 MG: 325 TABLET ORAL at 06:19

## 2018-09-19 RX ADMIN — ACETAMINOPHEN 650 MG: 325 TABLET ORAL at 16:39

## 2018-09-19 ASSESSMENT — ENCOUNTER SYMPTOMS
BLURRED VISION: 0
WHEEZING: 1
VOMITING: 0
ORTHOPNEA: 1
HEARTBURN: 0
PHOTOPHOBIA: 0
ABDOMINAL PAIN: 0
DIARRHEA: 0
COUGH: 1
SPUTUM PRODUCTION: 1
SORE THROAT: 0
HEMOPTYSIS: 0
SHORTNESS OF BREATH: 1
BLOOD IN STOOL: 0
NAUSEA: 0
CONSTIPATION: 0

## 2018-09-19 ASSESSMENT — PAIN SCALES - GENERAL
PAINLEVEL_OUTOF10: 8
PAINLEVEL_OUTOF10: 6
PAINLEVEL_OUTOF10: 6
PAINLEVEL_OUTOF10: 7
PAINLEVEL_OUTOF10: 3
PAINLEVEL_OUTOF10: 2
PAINLEVEL_OUTOF10: 8
PAINLEVEL_OUTOF10: 6
PAINLEVEL_OUTOF10: 8

## 2018-09-19 NOTE — PROGRESS NOTES
Net             -350 ml     Physical Exam   Constitutional: She is oriented to person, place, and time. She appears well-developed and well-nourished. No distress. HENT:   Head: Normocephalic and atraumatic. Mouth/Throat: Oropharynx is clear and moist. No oropharyngeal exudate. Eyes: Pupils are equal, round, and reactive to light. Conjunctivae and EOM are normal. No scleral icterus. Neck: Normal range of motion. Neck supple. No JVD present. No thyromegaly present. Cardiovascular: Regular rhythm, normal heart sounds and intact distal pulses. No murmur heard. Pulmonary/Chest: Effort normal. No respiratory distress. She has no wheezes (B/L mild expiratory). Abdominal: Soft. Bowel sounds are normal. She exhibits no distension and no mass. There is no tenderness. Musculoskeletal: Normal range of motion. She exhibits edema (mild b/l LE). She exhibits no tenderness. Lymphadenopathy:     She has no cervical adenopathy. Neurological: She is alert and oriented to person, place, and time. No cranial nerve deficit. Skin: Skin is warm. Psychiatric: She has a normal mood and affect.  Her behavior is normal.       Medications:      torsemide  20 mg Oral Daily    isosorbide dinitrate  20 mg Oral TID    spironolactone  25 mg Oral Daily    amLODIPine  10 mg Oral Daily    atorvastatin  40 mg Oral Nightly    famotidine  20 mg Oral BID    ferrous sulfate  325 mg Oral BID WC    fluticasone  1 spray Nasal Daily    hydrALAZINE  75 mg Oral 3 times per day    lisinopril  20 mg Oral Daily    cetirizine  10 mg Oral Daily    tiotropium  18 mcg Inhalation Daily    sodium chloride flush  10 mL Intravenous 2 times per day    enoxaparin  40 mg Subcutaneous Daily    mometasone-formoterol  2 puff Inhalation BID    gabapentin  800 mg Oral TID       Diagnostic Labs and Imaging    CBC:   Recent Labs      09/17/18   0731  09/18/18   0727  09/19/18   0713   WBC  6.3  8.5  6.9   RBC  3.05*  3.14*  2.98*   HGB 8. 3*  8.6*  8.1*   HCT  29.3*  29.4*  28.1*   MCV  96.1  93.6  94.3   RDW  13.2  13.5  13.3   PLT  235  269  235     BMP:   Recent Labs      09/17/18   0731  09/18/18   0727  09/19/18   0713   NA  143  145*  138   K  3.6*  3.7  3.5*   CL  97*  98  95*   CO2  33*  32*  32*   BUN  26*  35*  29*   CREATININE  1.02*  1.05*  0.80     BNP: No results for input(s): BNP in the last 72 hours. PT/INR: No results for input(s): PROTIME, INR in the last 72 hours. APTT: No results for input(s): APTT in the last 72 hours. CARDIAC ENZYMES:   No results for input(s): CKMB, CKMBINDEX, TROPONINI in the last 72 hours. Invalid input(s): CKTOTAL;3  FASTING LIPID PANEL:  Lab Results   Component Value Date    TRIG 102 02/15/2018     LIVER PROFILE: No results for input(s): AST, ALT, ALB, BILIDIR, BILITOT, ALKPHOS in the last 72 hours. Xr Chest Portable    Result Date: 9/15/2018  EXAMINATION: SINGLE XRAY VIEW OF THE CHEST 9/15/2018 4:55 pm COMPARISON: February 19, 2018 and February 17, 2018  Diminished penetration through soft tissue without convincing evidence for acute airspace disease. Cardiac silhouette enlargement is again noted. Assessment and Plan:   Principal Problem:    Acute on chronic diastolic CHF (congestive heart failure) (HCC)  Active Problems: Morbid obesity (HCC)    Obesity hypoventilation syndrome (HCC)    SOB (shortness of breath)    COPD (chronic obstructive pulmonary disease) (HCC)    Status post tracheostomy ([address]    Chronic respiratory failure (HCC)    Acute on chronic congestive heart failure ([address]  Resolved Problems:    * No resolved hospital problems. *    ASSESSMENT and PLAN:     1.  Acute on chr cor pulmonale   STEPH   Morbid obesity   Previous trach      - ECHO done in 01/2018 shows LVEF 65% with hyperdynamic systolic function and RV dilation  - Cardiac diet, Fluids restriction at 1500mL/day and Na <2g/day  - Torsemide 20 mg OD, discontinue IV Lasix  - Spironolactone 25 mg started on 09/17/2018  -

## 2018-09-19 NOTE — PROGRESS NOTES
Occupational Therapy   Occupational Therapy Initial Assessment  Date: 2018   Patient Name: Mina Hawk  MRN: [id number]     : 1975     Chief Complaint   Patient presents with    Congestive Heart Failure     SOB. pulmonary hypertension. was on lasix switched to diff diuretic and now SOB weight gain     Date of Service: 2018    Discharge Recommendations:  IP rehab, Continue to assess pending progress (versus home with OT and aide. Pt currently declining placement, reports pt just discharged from [address] [place] [address] in )  OT Equipment Recommendations  Equipment Needed: No    Patient Diagnosis(es): The primary encounter diagnosis was Acute on chronic congestive heart failure, unspecified heart failure type ([facility] A diagnosis of Obesity hypoventilation syndrome (HCC) was also pertinent to this visit. has a past medical history of Acute on chronic diastolic CHF (congestive heart failure) ([facility] COPD (chronic obstructive pulmonary disease) ([facility] Hypertension; and Pulmonary hypertension ([facility] has a past surgical history that includes hc cath power picc triple (2018); pr office/outpt visit,procedure only (N/A, 2018); Tracheotomy (2018); Gastrostomy tube placement (2018); and tracheostomy closure (2018).      Restrictions  Restrictions/Precautions  Restrictions/Precautions: Fall Risk  Required Braces or Orthoses?: No  Position Activity Restriction  Other position/activity restrictions: Up as tolerated     Subjective   General  Patient assessed for rehabilitation services?: Yes  Family / Caregiver Present: No  Pain Assessment  Patient Currently in Pain: Denies  Pain Assessment: 0-10  Pain Level: 6  Pain Type: Chronic pain  Pain Location: Wrist, Hand, Arm  Pain Orientation: Right  Pain Descriptors: Aching  Pain Frequency: Intermittent  Response to Pain Intervention: Asleep with RR greater than 10    Social/Functional History  Social/Functional History  Lives With: Son  Type of Home: House  Home Layout: Two level, Able to Live on Main level with bedroom/bathroom  Home Access: Stairs to enter with rails  Entrance Stairs - Number of Steps: 3  Entrance Stairs - Rails: Right  Home Equipment: Rolling walker, Wheelchair-manual, Oxygen, Alert Button  Receives Help From: Family, Home health (MWF for ADL, home care assistance, 5 hrs in AM and 2 hrs in PM. Saturday, HHA comes 5hrs.  )  ADL Assistance: Needs assistance  Bath: Minimal assistance  Dressing: Minimal assistance  Homemaking Assistance: Independent  Homemaking Responsibilities: No (Pt reports delivered meals from [employer] and A completes all home care activities )  Meal Prep Responsibility: Secondary  Laundry Responsibility: Secondary  Cleaning Responsibility: Secondary  Shopping Responsibility: Secondary  Ambulation Assistance: Independent  Transfer Assistance: Independent  Active : No  Mode of Transportation: Family  Additional Comments: Pt report son is in and out of home during the day      Objective   Vision: Impaired  Vision Exceptions: Wears glasses for distance  Hearing: Within functional limits    Orientation  Overall Orientation Status: Within Functional Limits  Observation/Palpation  Posture: Good  Balance  Sitting Balance: Independent (seated EOB )  Standing Balance: Stand by assistance (no device, standing EOB)  Standing Balance  Sit to stand: Stand by assistance  Stand to sit: Stand by assistance  Functional Mobility  Functional - Mobility Device: No device  Activity: Other  Assist Level: Contact guard assistance  ADL  Feeding: Independent  Grooming: Stand by assistance;Setup  UE Bathing: Setup;Minimal assistance (to wash back)  LE Bathing: Setup;Maximum assistance  UE Dressing: Setup;Minimal assistance  LE Dressing: Setup;Maximum assistance  Toileting: Minimal assistance  Additional Comments: Pt supine in bed on arrival. Pt completed bed mobility and sat at EOB. Pt assisted to complete UE ADL activities.  Pt took steps to 16/24  Functional Limitation: Self care  Self Care Current Status (): At least 40 percent but less than 60 percent impaired, limited or restricted  Self Care Goal Status (): At least 20 percent but less than 40 percent impaired, limited or restricted    AM-PAC Score  AM-PAC Inpatient Daily Activity Raw Score: 16  AM-PAC Inpatient ADL T-Scale Score : 35.96  ADL Inpatient CMS 0-100% Score: 53.32  ADL Inpatient CMS G-Code Modifier : CK  How much help from another person does the pt currently need? Unable A Lot A Little None   1. Putting on and taking off regular lower body clothing? 1      2      3       4   2. Bathing (including washing, rinsing, drying)? 1      2      3      4   3. Toileting, which includes using toilet, bedpan, or urinal?      1      2        3      4   4. Putting on and taking off regular upper body clothing? 1      2      3       4   5. Taking care of personal grooming such as brushing teeth? 1      2      3      4   6. Eating meals? 1      2       3       4     1. Unable = Total/Dependent Assist  2. A Lot = Maximum/Moderate Assist  3. A Little = Minimum/Contact Guard Assist/Supervision  4.  None= Modified [place]/Independent    Raw Score Scale Score Scale Score Standard Error Approximate Degree of Functional Impairment     6 17.07 3.74 100%   7 20.13 3.68 92%   8 22.86 3.43 86%   9 25.33 3.17 80%   10 27.31 2.96 75%   11 29.04 2.79 70%   12 30.60 2.68 67%   13 32.03 2.62 63%   14 33.39 2.61 60%   15 34.69 2.65 56%   16 35.96 2.71 53%   17 37.26 2.82 50%   18 38.66 2.97 47%   19 40.22 3.20 43%   20 42.03 3.55 38%   21 44.27 4.08 33%   22 47.10 4.81 26%   23 51.12 5.88 16%   24 57.54 7.36 0%     Goals  Short term goals  Time Frame for Short term goals: by discharge, pt will  Short term goal 1: demo I in UE ADL activities   Short term goal 2: demo mod A for LE ADL activities   Short term goal 3: demo I in functional transfers/ mobility with LRD to assist with

## 2018-09-19 NOTE — PROGRESS NOTES
functional mobility ; Decreased endurance  Prognosis: Good  Decision Making: Medium Complexity  Patient Education: PT POC  REQUIRES PT FOLLOW UP: Yes  Activity Tolerance  Activity Tolerance: Patient limited by endurance; Patient limited by fatigue        Goals  Short term goals  Time Frame for Short term goals: 14 visits  Short term goal 1: Independent bed mobility  Short term goal 2: Independent transfers  Short term goal 3: Independent ambulation with rolling walker 100 ft  Short term goal 4: Pt to tolerate 30 minutes of activity to improve endurance.   Short term goal 5: Pt to navigate 3 stairs with mod A  Patient Goals   Patient goals : Breathe better and go home    Plan    Plan  Times per week: 5-6x/week  Current Treatment Recommendations: Strengthening, Functional Mobility Training, Transfer Training, Gait Training, Stair training, Endurance Training, Safety Education & Training  Safety Devices  Type of devices: Left in bed, Nurse notified, Call light within reach     Therapy Time   Individual Concurrent Group Co-treatment   Time In  0315         Time Out  0345         Minutes  [address] [place] [address] [phone], PTA

## 2018-09-19 NOTE — PLAN OF CARE
Problem: RESPIRATORY  Intervention: Respiratory assessment  Vonda Osorio, RCPPatient Assessment complete. Acute on chronic diastolic CHF (congestive heart failure) ([facility] [I50.33] . Vitals:    09/19/18 0851   BP: (!) 158/58   Pulse:    Resp:    Temp:    SpO2:    . Patients home meds are   Prior to Admission medications    Medication Sig Start Date End Date Taking? Authorizing Provider   sertraline (ZOLOFT) 25 MG tablet Take 75 mg by mouth daily   Yes Historical Provider, MD   gabapentin (NEURONTIN) 800 MG tablet Take 800 mg by mouth 3 times daily. .   Yes Historical Provider, MD   ipratropium-albuterol (DUONEB) 0.5-2.5 (3) MG/3ML SOLN nebulizer solution Inhale 3 mLs into the lungs 4 times daily 2/21/18   Winnie Oswald MD   atorvastatin (LIPITOR) 40 MG tablet Take 1 tablet by mouth nightly 2/21/18   Winnie Oswald MD   cloNIDine (CATAPRES) 0.1 MG tablet Take 1 tablet by mouth 3 times daily as needed (SBP>160) 2/21/18   Winnie Oswald MD   hydrALAZINE (APRESOLINE) 25 MG tablet Take 3 tablets by mouth every 8 hours 2/21/18   Winnie Oswald MD   Glycopyrrolate 0.4 MG/2ML SOLN injection Infuse 0.5 mLs intravenously 3 times daily 2/21/18   Winnie Oswald MD   famotidine (PEPCID) 20 MG tablet Take 1 tablet by mouth 2 times daily 2/21/18   Winnie Oswald MD   nicotine (NICODERM CQ) 21 MG/24HR Place 1 patch onto the skin every 24 hours    Historical Provider, MD   doxycycline hyclate (VIBRA-TABS) 100 MG tablet Take 100 mg by mouth 2 times daily 2 times daily for 10 days    Historical Provider, MD   furosemide (LASIX) 20 MG tablet Take 20 mg by mouth daily    Historical Provider, MD   tiotropium (SPIRIVA) 18 MCG inhalation capsule Inhale 18 mcg into the lungs daily    Historical Provider, MD   albuterol sulfate  (90 Base) MCG/ACT inhaler Inhale 2 puffs into the lungs every 6 hours as needed for Wheezing    Historical Provider, MD   ferrous sulfate 325 (65 Fe) MG tablet Take 325 mg by mouth 2 times daily than or equal to 15ml/Kg []  less than 15ml/Kg []  less than 15ml/Kg   Surgery within last 2 weeks []  None or general   []  Abdominal or thoracic surgery  []  Abdominal or thoracic   Chronic Pulmonary Historyre []  No []  Yes []  Yes     []  Secretion Management Assessment  Score 1 2 3   Bilateral Breath Sounds   []  Occasional Rhonchi []  Scattered Rhonchi []  Course Rhonchi and/or poor aeration   Sputum    []  Small amount of thin secretions []  Moderate amount of viscous secretions []  Copius, Viscious Yellow/ Secretions   CXR as reported by physician []  clear  []  Unavailable []  Infiltrates and/or consolidation  []  Unavailable []  Mucus Plugging and or lobar consolidation  []  Unavailable   Cough []  Strong, productive cough []  Weak productive cough []  No cough or weak non-productive cough   Lexine Craig Black  10:18 AM                            FEMALE                                  MALE                            FEV1 Predicted Normal Values                        FEV1 Predicted Normal Values          Age                                     Height in Feet and Inches       Age                                     Height in Feet and Inches       4' 11\" 5' 1\" 5' 3\" 5' 5\" 5' 7\" 5' 9\" 5' 11\" 6' 1\"  4' 11\" 5' 1\" 5' 3\" 5' 5\" 5' 7\" 5' 9\" 5' 11\" 6' 1\"   42 - 45 2.49 2.66 2.84 3.03 3.22 3.42 3.62 3.83 42 - 45 2.82 3.03 3.26 3.49 3.72 3.96 4.22 4.47   46 - 49 2.40 2.57 2.76 2.94 3.14 3.33 3.54 3.75 46 - 49 2.70 2.92 3.14 3.37 3.61 3.85 4.10 4.36   50 - 53 2.31 2.48 2.66 2.85 3.04 3.24 3.45 3.66 50 - 53 2.58 2.80 3.02 3.25 3.49 3.73 3.98 4.24   54 - 57 2.21 2.38 2.57 2.75 2.95 3.14 3.35 3.56 54 - 57 2.46 2.67 2.89 3.12 3.36 3.60 3.85 4.11   58 - 61 2.10 2.28 2.46 2.65 2.84 3.04 3.24 3.45 58 - 61 2.32 2.54 2.76 2.99 3.23 3.47 3.72 3.98   62 - 65 1.99 2.17 2.35 2.54 2.73 2.93 3.13 3.34 62 - 65 2.19 2.40 2.62 2.85 3.09 3.33 3.58 3.84   66 - 69 1.88 2.05 2.23 2.42 2.61 2.81 3.02 3.23 66 - 69 2.04 2.26 2.48 2.71 2.95 3.19

## 2018-09-19 NOTE — PROGRESS NOTES
results for input(s): CHOL, HDL in the last 72 hours. Invalid input(s): LDLCALCU  INR: No results for input(s): INR in the last 72 hours. EK/15/2018  Normal sinus rhythm  Minimal voltage criteria for LVH, may be normal variant  Borderline ECG  When compared with ECG of 2018 23:20,  No significant change was found     2018  Normal sinus rhythm  Normal ECG        Echocardiogram:  2018  Technically difficult study. All segments not well seen. No comment can be  made regarding specific wall motion. LV chamber dimension is normal. Systolic function appears to be hyperdynamic  with an estimated EF of 65%. Right ventricular dilatation with normal systolic function. No significant valvular regurgitation or stenosis seen.       Objective:   Vitals: /75   Pulse 80   Temp 97.5 °F (36.4 °C) (Oral)   Resp 18   Ht 5' (1.524 m)   Wt (!) 372 lb 3.2 oz (168.8 kg)   SpO2 100%   BMI 72.69 kg/m²   General appearance: alert and cooperative with exam, generalized edema   HEENT: Head: Normocephalic, no lesions, without obvious abnormality. Neck: no JVD, trachea midline, no adenopathy, on . Lungs: Clear to auscultation  Heart: Regular rate and rhythm, s1/s2 auscultated, no murmurs  Abdomen: soft, non-tender, bowel sounds active  Extremities: no edema  Neurologic: not done        Assessment / Acute Cardiac Problems:   1. Acute on chronic diastolic CHF  2.   Hypotension   Patient Active Problem List:     Pneumonia of both lower lobes due to infectious organism [facility])     NSTEMI (non-ST elevated myocardial infarction) ([facility]     Acute pulmonary edema (HCC)     Hypertensive emergency     Acute respiratory failure with hypoxia and hypercapnia ([facility])     Smoker     Morbid obesity ([facility]     Obesity hypoventilation syndrome (HCC)     SOB (shortness of breath)     COPD (chronic obstructive pulmonary disease) ([facility])     ARDS (adult respiratory distress syndrome) ([facility]     Fever     Disease due to

## 2018-09-20 LAB
ABSOLUTE EOS #: 0.22 K/UL (ref 0–0.44)
ABSOLUTE IMMATURE GRANULOCYTE: 0.03 K/UL (ref 0–0.3)
ABSOLUTE LYMPH #: 2.11 K/UL (ref 1.1–3.7)
ABSOLUTE MONO #: 0.61 K/UL (ref 0.1–1.2)
ANION GAP SERPL CALCULATED.3IONS-SCNC: 13 MMOL/L (ref 9–17)
BASOPHILS # BLD: 0 % (ref 0–2)
BASOPHILS ABSOLUTE: <0.03 K/UL (ref 0–0.2)
BUN BLDV-MCNC: 31 MG/DL (ref 6–20)
BUN/CREAT BLD: ABNORMAL (ref 9–20)
CALCIUM SERPL-MCNC: 8.7 MG/DL (ref 8.6–10.4)
CHLORIDE BLD-SCNC: 100 MMOL/L (ref 98–107)
CO2: 27 MMOL/L (ref 20–31)
CREAT SERPL-MCNC: 0.95 MG/DL (ref 0.5–0.9)
DIFFERENTIAL TYPE: ABNORMAL
EOSINOPHILS RELATIVE PERCENT: 3 % (ref 1–4)
GFR AFRICAN AMERICAN: >60 ML/MIN
GFR NON-AFRICAN AMERICAN: >60 ML/MIN
GFR SERPL CREATININE-BSD FRML MDRD: ABNORMAL ML/MIN/{1.73_M2}
GFR SERPL CREATININE-BSD FRML MDRD: ABNORMAL ML/MIN/{1.73_M2}
GLUCOSE BLD-MCNC: 97 MG/DL (ref 70–99)
HCT VFR BLD CALC: 27.7 % (ref 36.3–47.1)
HEMOGLOBIN: 8 G/DL (ref 11.9–15.1)
IMMATURE GRANULOCYTES: 0 %
LYMPHOCYTES # BLD: 30 % (ref 24–43)
MCH RBC QN AUTO: 27.6 PG (ref 25.2–33.5)
MCHC RBC AUTO-ENTMCNC: 28.9 G/DL (ref 28.4–34.8)
MCV RBC AUTO: 95.5 FL (ref 82.6–102.9)
MONOCYTES # BLD: 9 % (ref 3–12)
NRBC AUTOMATED: 0 PER 100 WBC
PDW BLD-RTO: 13.3 % (ref 11.8–14.4)
PLATELET # BLD: 263 K/UL (ref 138–453)
PLATELET ESTIMATE: ABNORMAL
PMV BLD AUTO: 10.1 FL (ref 8.1–13.5)
POTASSIUM SERPL-SCNC: 4.1 MMOL/L (ref 3.7–5.3)
RBC # BLD: 2.9 M/UL (ref 3.95–5.11)
RBC # BLD: ABNORMAL 10*6/UL
SEG NEUTROPHILS: 58 % (ref 36–65)
SEGMENTED NEUTROPHILS ABSOLUTE COUNT: 4.02 K/UL (ref 1.5–8.1)
SODIUM BLD-SCNC: 140 MMOL/L (ref 135–144)
WBC # BLD: 7 K/UL (ref 3.5–11.3)
WBC # BLD: ABNORMAL 10*3/UL

## 2018-09-20 PROCEDURE — 2060000000 HC ICU INTERMEDIATE R&B

## 2018-09-20 PROCEDURE — 6360000002 HC RX W HCPCS: Performed by: STUDENT IN AN ORGANIZED HEALTH CARE EDUCATION/TRAINING PROGRAM

## 2018-09-20 PROCEDURE — 94640 AIRWAY INHALATION TREATMENT: CPT

## 2018-09-20 PROCEDURE — 6370000000 HC RX 637 (ALT 250 FOR IP): Performed by: STUDENT IN AN ORGANIZED HEALTH CARE EDUCATION/TRAINING PROGRAM

## 2018-09-20 PROCEDURE — 2580000003 HC RX 258: Performed by: STUDENT IN AN ORGANIZED HEALTH CARE EDUCATION/TRAINING PROGRAM

## 2018-09-20 PROCEDURE — 85025 COMPLETE CBC W/AUTO DIFF WBC: CPT

## 2018-09-20 PROCEDURE — 94660 CPAP INITIATION&MGMT: CPT

## 2018-09-20 PROCEDURE — 80048 BASIC METABOLIC PNL TOTAL CA: CPT

## 2018-09-20 PROCEDURE — 36415 COLL VENOUS BLD VENIPUNCTURE: CPT

## 2018-09-20 PROCEDURE — 6370000000 HC RX 637 (ALT 250 FOR IP): Performed by: NURSE PRACTITIONER

## 2018-09-20 PROCEDURE — 99232 SBSQ HOSP IP/OBS MODERATE 35: CPT | Performed by: INTERNAL MEDICINE

## 2018-09-20 PROCEDURE — 94762 N-INVAS EAR/PLS OXIMTRY CONT: CPT

## 2018-09-20 PROCEDURE — 97116 GAIT TRAINING THERAPY: CPT

## 2018-09-20 PROCEDURE — 97110 THERAPEUTIC EXERCISES: CPT

## 2018-09-20 PROCEDURE — 97530 THERAPEUTIC ACTIVITIES: CPT

## 2018-09-20 RX ORDER — ISOSORBIDE DINITRATE 20 MG/1
20 TABLET ORAL 3 TIMES DAILY
Qty: 90 TABLET | Refills: 3 | Status: SHIPPED | OUTPATIENT
Start: 2018-09-20

## 2018-09-20 RX ORDER — SPIRONOLACTONE 25 MG/1
25 TABLET ORAL DAILY
Qty: 30 TABLET | Refills: 1 | Status: ON HOLD | OUTPATIENT
Start: 2018-09-20 | End: 2018-11-21 | Stop reason: HOSPADM

## 2018-09-20 RX ORDER — BUTALBITAL, ACETAMINOPHEN AND CAFFEINE 50; 325; 40 MG/1; MG/1; MG/1
1 TABLET ORAL ONCE
Status: COMPLETED | OUTPATIENT
Start: 2018-09-20 | End: 2018-09-20

## 2018-09-20 RX ORDER — BUTALBITAL, ACETAMINOPHEN AND CAFFEINE 50; 325; 40 MG/1; MG/1; MG/1
1 TABLET ORAL DAILY PRN
Status: DISCONTINUED | OUTPATIENT
Start: 2018-09-20 | End: 2018-09-21 | Stop reason: HOSPADM

## 2018-09-20 RX ORDER — BLOOD PRESSURE TEST KIT
1 KIT MISCELLANEOUS 2 TIMES DAILY
Qty: 1 KIT | Refills: 0 | Status: SHIPPED | OUTPATIENT
Start: 2018-09-20

## 2018-09-20 RX ORDER — TORSEMIDE 20 MG/1
20 TABLET ORAL DAILY
Qty: 30 TABLET | Refills: 1 | Status: ON HOLD | OUTPATIENT
Start: 2018-09-20 | End: 2018-11-21 | Stop reason: HOSPADM

## 2018-09-20 RX ORDER — HYDRALAZINE HYDROCHLORIDE 25 MG/1
25 TABLET, FILM COATED ORAL EVERY 8 HOURS SCHEDULED
Qty: 90 TABLET | Refills: 3 | Status: SHIPPED | OUTPATIENT
Start: 2018-09-20

## 2018-09-20 RX ADMIN — ISOSORBIDE DINITRATE 20 MG: 10 TABLET ORAL at 12:51

## 2018-09-20 RX ADMIN — BUTALBITAL, ACETAMINOPHEN, AND CAFFEINE 1 TABLET: 50; 325; 40 TABLET ORAL at 02:52

## 2018-09-20 RX ADMIN — LISINOPRIL 20 MG: 20 TABLET ORAL at 12:53

## 2018-09-20 RX ADMIN — FAMOTIDINE 20 MG: 20 TABLET, FILM COATED ORAL at 20:34

## 2018-09-20 RX ADMIN — OXYCODONE HYDROCHLORIDE AND ACETAMINOPHEN 1 TABLET: 5; 325 TABLET ORAL at 18:08

## 2018-09-20 RX ADMIN — TIOTROPIUM BROMIDE 18 MCG: 18 CAPSULE ORAL; RESPIRATORY (INHALATION) at 10:46

## 2018-09-20 RX ADMIN — FLUTICASONE PROPIONATE 1 SPRAY: 50 SPRAY, METERED NASAL at 08:49

## 2018-09-20 RX ADMIN — FAMOTIDINE 20 MG: 20 TABLET, FILM COATED ORAL at 08:36

## 2018-09-20 RX ADMIN — HYDRALAZINE HYDROCHLORIDE 75 MG: 50 TABLET, FILM COATED ORAL at 23:05

## 2018-09-20 RX ADMIN — GABAPENTIN 800 MG: 800 TABLET ORAL at 20:35

## 2018-09-20 RX ADMIN — FERROUS SULFATE TAB EC 325 MG (65 MG FE EQUIVALENT) 325 MG: 325 (65 FE) TABLET DELAYED RESPONSE at 18:08

## 2018-09-20 RX ADMIN — ENOXAPARIN SODIUM 40 MG: 40 INJECTION SUBCUTANEOUS at 08:37

## 2018-09-20 RX ADMIN — ISOSORBIDE DINITRATE 20 MG: 10 TABLET ORAL at 20:34

## 2018-09-20 RX ADMIN — CETIRIZINE HYDROCHLORIDE 10 MG: 10 TABLET ORAL at 20:34

## 2018-09-20 RX ADMIN — MOMETASONE FUROATE AND FORMOTEROL FUMARATE DIHYDRATE 2 PUFF: 100; 5 AEROSOL RESPIRATORY (INHALATION) at 10:46

## 2018-09-20 RX ADMIN — TORSEMIDE 20 MG: 20 TABLET ORAL at 08:36

## 2018-09-20 RX ADMIN — HYDRALAZINE HYDROCHLORIDE 75 MG: 50 TABLET, FILM COATED ORAL at 18:09

## 2018-09-20 RX ADMIN — FERROUS SULFATE TAB EC 325 MG (65 MG FE EQUIVALENT) 325 MG: 325 (65 FE) TABLET DELAYED RESPONSE at 08:36

## 2018-09-20 RX ADMIN — GABAPENTIN 800 MG: 800 TABLET ORAL at 12:50

## 2018-09-20 RX ADMIN — MOMETASONE FUROATE AND FORMOTEROL FUMARATE DIHYDRATE 2 PUFF: 100; 5 AEROSOL RESPIRATORY (INHALATION) at 21:19

## 2018-09-20 RX ADMIN — OXYCODONE HYDROCHLORIDE AND ACETAMINOPHEN 1 TABLET: 5; 325 TABLET ORAL at 08:35

## 2018-09-20 RX ADMIN — SPIRONOLACTONE 25 MG: 25 TABLET ORAL at 12:50

## 2018-09-20 RX ADMIN — GABAPENTIN 800 MG: 800 TABLET ORAL at 18:21

## 2018-09-20 RX ADMIN — OXYCODONE HYDROCHLORIDE AND ACETAMINOPHEN 1 TABLET: 5; 325 TABLET ORAL at 13:05

## 2018-09-20 RX ADMIN — Medication 10 ML: at 20:30

## 2018-09-20 RX ADMIN — DESMOPRESSIN ACETATE 40 MG: 0.2 TABLET ORAL at 20:35

## 2018-09-20 RX ADMIN — Medication 10 ML: at 08:51

## 2018-09-20 RX ADMIN — AMLODIPINE BESYLATE 10 MG: 10 TABLET ORAL at 12:54

## 2018-09-20 RX ADMIN — BUTALBITAL, ACETAMINOPHEN, AND CAFFEINE 1 TABLET: 50; 325; 40 TABLET ORAL at 20:37

## 2018-09-20 ASSESSMENT — ENCOUNTER SYMPTOMS
SPUTUM PRODUCTION: 1
ABDOMINAL PAIN: 0
WHEEZING: 1
CONSTIPATION: 0
VOMITING: 0
NAUSEA: 0
HEMOPTYSIS: 0
COUGH: 1
ORTHOPNEA: 1
PHOTOPHOBIA: 0
SORE THROAT: 0
BLOOD IN STOOL: 0
SHORTNESS OF BREATH: 1
HEARTBURN: 0
BLURRED VISION: 0
DIARRHEA: 0

## 2018-09-20 ASSESSMENT — PAIN DESCRIPTION - FREQUENCY: FREQUENCY: INTERMITTENT

## 2018-09-20 ASSESSMENT — PAIN SCALES - GENERAL
PAINLEVEL_OUTOF10: 6
PAINLEVEL_OUTOF10: 9
PAINLEVEL_OUTOF10: 6
PAINLEVEL_OUTOF10: 7
PAINLEVEL_OUTOF10: 7
PAINLEVEL_OUTOF10: 6

## 2018-09-20 ASSESSMENT — PAIN DESCRIPTION - DESCRIPTORS: DESCRIPTORS: HEADACHE

## 2018-09-20 ASSESSMENT — PAIN DESCRIPTION - PAIN TYPE: TYPE: CHRONIC PAIN

## 2018-09-20 ASSESSMENT — PAIN DESCRIPTION - LOCATION: LOCATION: HEAD

## 2018-09-20 ASSESSMENT — PAIN DESCRIPTION - ONSET: ONSET: ON-GOING

## 2018-09-20 NOTE — PROGRESS NOTES
(168.8 kg)   SpO2 100%   BMI 72.69 kg/m²         Intake/Output Summary (Last 24 hours) at 09/20/18 1103  Last data filed at 09/20/18 0900   Gross per 24 hour   Intake              310 ml   Output             1800 ml   Net            -1490 ml     Physical Exam   Constitutional: She is oriented to person, place, and time. She appears well-developed and well-nourished. No distress. HENT:   Head: Normocephalic and atraumatic. Mouth/Throat: Oropharynx is clear and moist. No oropharyngeal exudate. Eyes: Pupils are equal, round, and reactive to light. Conjunctivae and EOM are normal. No scleral icterus. Neck: Normal range of motion. Neck supple. No JVD present. No thyromegaly present. Cardiovascular: Regular rhythm, normal heart sounds and intact distal pulses. No murmur heard. Pulmonary/Chest: Effort normal. No respiratory distress. She has no wheezes. Abdominal: Soft. Bowel sounds are normal. She exhibits no distension and no mass. There is no tenderness. Musculoskeletal: Normal range of motion. She exhibits edema (mild b/l LE). She exhibits no tenderness. Lymphadenopathy:     She has no cervical adenopathy. Neurological: She is alert and oriented to person, place, and time. No cranial nerve deficit. Skin: Skin is warm. Psychiatric: She has a normal mood and affect.  Her behavior is normal.       Medications:      torsemide  20 mg Oral Daily    isosorbide dinitrate  20 mg Oral TID    spironolactone  25 mg Oral Daily    amLODIPine  10 mg Oral Daily    atorvastatin  40 mg Oral Nightly    famotidine  20 mg Oral BID    ferrous sulfate  325 mg Oral BID     fluticasone  1 spray Nasal Daily    hydrALAZINE  75 mg Oral 3 times per day    lisinopril  20 mg Oral Daily    cetirizine  10 mg Oral Daily    tiotropium  18 mcg Inhalation Daily    sodium chloride flush  10 mL Intravenous 2 times per day    enoxaparin  40 mg Subcutaneous Daily    mometasone-formoterol  2 puff Inhalation BID    systolic function and RV dilation  - Cardiac diet, Fluids restriction at 1500mL/day and Na <2g/day  - Torsemide 20 mg OD, discontinued IV Lasix  - Spironolactone 25 mg started on 09/17/2018  - BiPAP, at night  - Maintain 3L oxygen through nasal canula  - BNP and troponin are negative. - CHF nurse consult  - Cardiology consulted, follow recommendations  - CXR did not show interval change  - ABG showed hypercapnia and respiratory acidosis, possible obesity hypoventilation  - Sleep study as out-patient  - Surgery consult for morbid obesity and bariatric obtained, discussed with the patient for out-patient follow-up  - Home oxygen evaluation, F/U  - F/U ECHO report       2. Asthma unspecified severity   - Continue inhaler, Spiriva and Flonase  - albuterol nebulizations as necessary  - RT evaluation and treatment     3. HTN   - Chronic diastolic chf   - Continue Clonidine, Amlodipine and Lisinopril  - Reduce Hydralazine dose to 25mg TID     Up as tolerated  PT/OT eval and treat  Lovenox 40mg SC, daily  Cardiac diet, fluids <1500mL, Na<2g/day    Patient expressed her choice of wanting to have the option of \"No Intubation\" in case of worsening of her medical condition. The code statuses have been made clear with her and explained the importance of sharing and stressing her wishes with the family. Palliative medicine consult was offered. Plan to discharge today if okay with Cardiology and after ECHO report reviewed    Destiny Chavez MD  PGY-1 Internal Medicine Resident  [facility], [place], [place]  9/20/2018 11:03 AM Attending Physician Statement  I have discussed the care of [address] [place] [address], including pertinent history and exam findings,  with the resident. I have seen and examined the patient and the key elements of all parts of the encounter have been performed by me. I agree with the assessment, plan and orders as documented by the resident with additions .   OK to go home  Will get STEPH  As

## 2018-09-20 NOTE — CARE COORDINATION
Transition planning:  Spoke with pt, she now would like referral to [facility], feels she cannot go home from here but doesn't want SNF.   Referral MD fred notified by PS

## 2018-09-20 NOTE — PROGRESS NOTES
the last 72 hours. EK/15/2018  Normal sinus rhythm  Minimal voltage criteria for LVH, may be normal variant  Borderline ECG  When compared with ECG of 2018 23:20,  No significant change was found     2018  Normal sinus rhythm  Normal ECG        Echocardiogram:  2018  Technically difficult study. All segments not well seen. No comment can be  made regarding specific wall motion. LV chamber dimension is normal. Systolic function appears to be hyperdynamic  with an estimated EF of 65%. Right ventricular dilatation with normal systolic function. No significant valvular regurgitation or stenosis seen.       Objective:   Vitals: BP (!) 121/57   Pulse 78   Temp 98.2 °F (36.8 °C) (Oral)   Resp 16   Ht 5' (1.524 m)   Wt (!) 372 lb 3.2 oz (168.8 kg)   SpO2 100%   BMI 72.69 kg/m²   General appearance: alert and cooperative with exam, generalized edema   HEENT: Head: Normocephalic, no lesions, without obvious abnormality. Neck: no JVD, trachea midline, no adenopathy, on . Lungs: Clear to auscultation throughout - no wheezing, rhonchi, or rales. On chronic NC oxygen  Heart: Regular rate and rhythm, s1/s2 auscultated, no murmurs. SR  Abdomen: soft, non-tender, bowel sounds active  Extremities: +1-+2 b/l LE edema - significant improvement  Neurologic: not done        Assessment / Acute Cardiac Problems:   1. Acute on chronic diastolic CHF  2. Hypotension  3. Morbid obesity  4. Obesity Hypoventilation Syndrome  5.   COPD    Patient Active Problem List:     Pneumonia of both lower lobes due to infectious organism [facility])     NSTEMI (non-ST elevated myocardial infarction) ([facility]     Acute pulmonary edema ([address]     Hypertensive emergency     Acute respiratory failure with hypoxia and hypercapnia (HCC)     Smoker     Morbid obesity ([address]     Obesity hypoventilation syndrome (HCC)     SOB (shortness of breath)     COPD (chronic obstructive pulmonary disease) (HCC)     ARDS (adult respiratory

## 2018-09-21 VITALS
RESPIRATION RATE: 18 BRPM | HEART RATE: 85 BPM | HEIGHT: 60 IN | TEMPERATURE: 98.2 F | BODY MASS INDEX: 57.52 KG/M2 | DIASTOLIC BLOOD PRESSURE: 53 MMHG | WEIGHT: 293 LBS | SYSTOLIC BLOOD PRESSURE: 112 MMHG | OXYGEN SATURATION: 100 %

## 2018-09-21 LAB
ABSOLUTE EOS #: 0.21 K/UL (ref 0–0.44)
ABSOLUTE IMMATURE GRANULOCYTE: 0.04 K/UL (ref 0–0.3)
ABSOLUTE LYMPH #: 1.78 K/UL (ref 1.1–3.7)
ABSOLUTE MONO #: 0.7 K/UL (ref 0.1–1.2)
ANION GAP SERPL CALCULATED.3IONS-SCNC: 10 MMOL/L (ref 9–17)
BASOPHILS # BLD: 0 % (ref 0–2)
BASOPHILS ABSOLUTE: <0.03 K/UL (ref 0–0.2)
BUN BLDV-MCNC: 31 MG/DL (ref 6–20)
BUN/CREAT BLD: ABNORMAL (ref 9–20)
CALCIUM SERPL-MCNC: 8.9 MG/DL (ref 8.6–10.4)
CHLORIDE BLD-SCNC: 99 MMOL/L (ref 98–107)
CO2: 30 MMOL/L (ref 20–31)
CREAT SERPL-MCNC: 1.03 MG/DL (ref 0.5–0.9)
DIFFERENTIAL TYPE: ABNORMAL
EOSINOPHILS RELATIVE PERCENT: 3 % (ref 1–4)
GFR AFRICAN AMERICAN: >60 ML/MIN
GFR NON-AFRICAN AMERICAN: 58 ML/MIN
GFR SERPL CREATININE-BSD FRML MDRD: ABNORMAL ML/MIN/{1.73_M2}
GFR SERPL CREATININE-BSD FRML MDRD: ABNORMAL ML/MIN/{1.73_M2}
GLUCOSE BLD-MCNC: 109 MG/DL (ref 70–99)
HCT VFR BLD CALC: 30.1 % (ref 36.3–47.1)
HEMOGLOBIN: 8.7 G/DL (ref 11.9–15.1)
IMMATURE GRANULOCYTES: 1 %
LYMPHOCYTES # BLD: 27 % (ref 24–43)
MCH RBC QN AUTO: 27.2 PG (ref 25.2–33.5)
MCHC RBC AUTO-ENTMCNC: 28.9 G/DL (ref 28.4–34.8)
MCV RBC AUTO: 94.1 FL (ref 82.6–102.9)
MONOCYTES # BLD: 11 % (ref 3–12)
NRBC AUTOMATED: 0 PER 100 WBC
PDW BLD-RTO: 13.2 % (ref 11.8–14.4)
PLATELET # BLD: 253 K/UL (ref 138–453)
PLATELET ESTIMATE: ABNORMAL
PMV BLD AUTO: 10.2 FL (ref 8.1–13.5)
POTASSIUM SERPL-SCNC: 4.5 MMOL/L (ref 3.7–5.3)
RBC # BLD: 3.2 M/UL (ref 3.95–5.11)
RBC # BLD: ABNORMAL 10*6/UL
SEG NEUTROPHILS: 58 % (ref 36–65)
SEGMENTED NEUTROPHILS ABSOLUTE COUNT: 3.8 K/UL (ref 1.5–8.1)
SODIUM BLD-SCNC: 139 MMOL/L (ref 135–144)
WBC # BLD: 6.6 K/UL (ref 3.5–11.3)
WBC # BLD: ABNORMAL 10*3/UL

## 2018-09-21 PROCEDURE — 99232 SBSQ HOSP IP/OBS MODERATE 35: CPT | Performed by: INTERNAL MEDICINE

## 2018-09-21 PROCEDURE — 97530 THERAPEUTIC ACTIVITIES: CPT

## 2018-09-21 PROCEDURE — 6360000002 HC RX W HCPCS: Performed by: STUDENT IN AN ORGANIZED HEALTH CARE EDUCATION/TRAINING PROGRAM

## 2018-09-21 PROCEDURE — 2580000003 HC RX 258: Performed by: STUDENT IN AN ORGANIZED HEALTH CARE EDUCATION/TRAINING PROGRAM

## 2018-09-21 PROCEDURE — 36415 COLL VENOUS BLD VENIPUNCTURE: CPT

## 2018-09-21 PROCEDURE — 97116 GAIT TRAINING THERAPY: CPT

## 2018-09-21 PROCEDURE — 94640 AIRWAY INHALATION TREATMENT: CPT

## 2018-09-21 PROCEDURE — 6370000000 HC RX 637 (ALT 250 FOR IP): Performed by: STUDENT IN AN ORGANIZED HEALTH CARE EDUCATION/TRAINING PROGRAM

## 2018-09-21 PROCEDURE — 80048 BASIC METABOLIC PNL TOTAL CA: CPT

## 2018-09-21 PROCEDURE — 6370000000 HC RX 637 (ALT 250 FOR IP): Performed by: NURSE PRACTITIONER

## 2018-09-21 PROCEDURE — 94762 N-INVAS EAR/PLS OXIMTRY CONT: CPT

## 2018-09-21 PROCEDURE — 85025 COMPLETE CBC W/AUTO DIFF WBC: CPT

## 2018-09-21 PROCEDURE — 94660 CPAP INITIATION&MGMT: CPT

## 2018-09-21 RX ORDER — SERTRALINE HYDROCHLORIDE 25 MG/1
25 TABLET, FILM COATED ORAL DAILY
Status: DISCONTINUED | OUTPATIENT
Start: 2018-09-21 | End: 2018-09-21 | Stop reason: HOSPADM

## 2018-09-21 RX ADMIN — FAMOTIDINE 20 MG: 20 TABLET, FILM COATED ORAL at 08:34

## 2018-09-21 RX ADMIN — MOMETASONE FUROATE AND FORMOTEROL FUMARATE DIHYDRATE 2 PUFF: 100; 5 AEROSOL RESPIRATORY (INHALATION) at 08:55

## 2018-09-21 RX ADMIN — DESMOPRESSIN ACETATE 40 MG: 0.2 TABLET ORAL at 21:06

## 2018-09-21 RX ADMIN — ISOSORBIDE DINITRATE 20 MG: 10 TABLET ORAL at 14:05

## 2018-09-21 RX ADMIN — GABAPENTIN 800 MG: 800 TABLET ORAL at 21:06

## 2018-09-21 RX ADMIN — TIOTROPIUM BROMIDE 18 MCG: 18 CAPSULE ORAL; RESPIRATORY (INHALATION) at 08:55

## 2018-09-21 RX ADMIN — Medication 10 ML: at 08:22

## 2018-09-21 RX ADMIN — CETIRIZINE HYDROCHLORIDE 10 MG: 10 TABLET ORAL at 21:06

## 2018-09-21 RX ADMIN — OXYCODONE HYDROCHLORIDE AND ACETAMINOPHEN 1 TABLET: 5; 325 TABLET ORAL at 21:10

## 2018-09-21 RX ADMIN — FERROUS SULFATE TAB EC 325 MG (65 MG FE EQUIVALENT) 325 MG: 325 (65 FE) TABLET DELAYED RESPONSE at 21:06

## 2018-09-21 RX ADMIN — FERROUS SULFATE TAB EC 325 MG (65 MG FE EQUIVALENT) 325 MG: 325 (65 FE) TABLET DELAYED RESPONSE at 08:34

## 2018-09-21 RX ADMIN — GABAPENTIN 800 MG: 800 TABLET ORAL at 14:05

## 2018-09-21 RX ADMIN — SPIRONOLACTONE 25 MG: 25 TABLET ORAL at 08:33

## 2018-09-21 RX ADMIN — BUTALBITAL, ACETAMINOPHEN, AND CAFFEINE 1 TABLET: 50; 325; 40 TABLET ORAL at 11:03

## 2018-09-21 RX ADMIN — ENOXAPARIN SODIUM 40 MG: 40 INJECTION SUBCUTANEOUS at 08:35

## 2018-09-21 RX ADMIN — FAMOTIDINE 20 MG: 20 TABLET, FILM COATED ORAL at 21:06

## 2018-09-21 RX ADMIN — ISOSORBIDE DINITRATE 20 MG: 10 TABLET ORAL at 08:34

## 2018-09-21 RX ADMIN — GABAPENTIN 800 MG: 800 TABLET ORAL at 08:33

## 2018-09-21 RX ADMIN — LISINOPRIL 20 MG: 20 TABLET ORAL at 08:34

## 2018-09-21 RX ADMIN — AMLODIPINE BESYLATE 10 MG: 10 TABLET ORAL at 08:34

## 2018-09-21 RX ADMIN — HYDRALAZINE HYDROCHLORIDE 75 MG: 50 TABLET, FILM COATED ORAL at 14:05

## 2018-09-21 RX ADMIN — HYDRALAZINE HYDROCHLORIDE 75 MG: 50 TABLET, FILM COATED ORAL at 06:28

## 2018-09-21 RX ADMIN — OXYCODONE HYDROCHLORIDE AND ACETAMINOPHEN 1 TABLET: 5; 325 TABLET ORAL at 15:36

## 2018-09-21 RX ADMIN — TORSEMIDE 20 MG: 20 TABLET ORAL at 08:33

## 2018-09-21 RX ADMIN — OXYCODONE HYDROCHLORIDE AND ACETAMINOPHEN 1 TABLET: 5; 325 TABLET ORAL at 06:28

## 2018-09-21 RX ADMIN — ISOSORBIDE DINITRATE 20 MG: 10 TABLET ORAL at 21:06

## 2018-09-21 RX ADMIN — SERTRALINE 25 MG: 25 TABLET, FILM COATED ORAL at 14:05

## 2018-09-21 RX ADMIN — FLUTICASONE PROPIONATE 1 SPRAY: 50 SPRAY, METERED NASAL at 08:33

## 2018-09-21 ASSESSMENT — ENCOUNTER SYMPTOMS
COUGH: 0
SHORTNESS OF BREATH: 0
DIARRHEA: 0
SPUTUM PRODUCTION: 0
ABDOMINAL PAIN: 0
HEMOPTYSIS: 0
VOMITING: 0
NAUSEA: 0

## 2018-09-21 ASSESSMENT — PAIN SCALES - GENERAL
PAINLEVEL_OUTOF10: 6
PAINLEVEL_OUTOF10: 8
PAINLEVEL_OUTOF10: 7
PAINLEVEL_OUTOF10: 8
PAINLEVEL_OUTOF10: 7

## 2018-09-21 NOTE — PROGRESS NOTES
sway, brief standing rest breaks needed  Distance: 20 ft,seated rest, 20' x 1. Comments: VERY LABORED BREATHING, O2 96%, not safe to ambulate alone  Stairs/Curb  Stairs?: No (Not safe to attempt)     Balance  Sitting - Static: Good  Sitting - Dynamic: Good  Standing - Static: Good  Standing - Dynamic: Fair;-  Comments: Pt sat on EOB ~20 mins total SBA   Assessment   Body structures, Functions, Activity limitations: Decreased functional mobility ; Decreased endurance  Assessment: Pt is a fall risk and expressed concern about returning home Independently; pt noting there will be extended time that she would be home alone; Pt noting that she prefers to go to IP rehab vs SNF,  Pt would benefit from continued rehab prior to retiurning home  Prognosis: Good     Patient Education: SNF vs IP rehab; Need for placement, risk of falls, seated HEP and isometrics, PLB  REQUIRES PT FOLLOW UP: Yes  Activity Tolerance  Activity Tolerance: Patient limited by endurance; Patient limited by fatigue        Goals  Short term goals  Time Frame for Short term goals: 14 visits  Short term goal 1: Independent bed mobility  Short term goal 2: Independent transfers  Short term goal 3: Independent ambulation with rolling walker 100 ft  Short term goal 4: Pt to tolerate 30 minutes of activity to improve endurance.   Short term goal 5: Pt to navigate 3 stairs with mod A  Patient Goals   Patient goals : Breathe better and go home    Plan    Plan  Times per week: 5-6x/week  Current Treatment Recommendations: Strengthening, Functional Mobility Training, Transfer Training, Gait Training, Stair training, Endurance Training, Safety Education & Training  Safety Devices  Type of devices: Left in bed, Nurse notified, Call light within reach, All fall risk precautions in place     Therapy Time   Individual Concurrent Group Co-treatment   Time In  0200         Time Out  0230         Minutes  100 [facility]

## 2018-09-21 NOTE — PROGRESS NOTES
[address] [place] [address]     Department of Internal Medicine - Staff Internal Medicine Service     DAILY PROGRESS NOTE       Patient:  Celsa Herman  YOB: 1975  MRN: [id number]     Acct: [de-identified]     Admit date: 9/15/2018  Admitting Diagnosis: Acute on chronic diastolic CHF (congestive heart failure) (HCC)    Subjective:   Patient seen and examined at bedside  No acute events overnight  Used BIPAP last night  Reported that she gets short of breath on physical activity  Denied any SOB, chest pain, nausea. Abdominal pain, diarrhea  -120  Reported that she doesn't take lisinopril at home, will discontinue as blood pressure is also on lower side, EF is 65%       Objective:   BP (!) 121/55   Pulse 75   Temp 98 °F (36.7 °C) (Oral)   Resp 14   Ht 5' (1.524 m)   Wt (!) 377 lb 1.6 oz (171.1 kg)   SpO2 98%   BMI 73.65 kg/m²         Review of Systems   Constitutional: Negative for chills and fever. Respiratory: Negative for cough, hemoptysis, sputum production and shortness of breath. Cardiovascular: Negative for chest pain, palpitations and leg swelling. Gastrointestinal: Negative for abdominal pain, diarrhea, nausea and vomiting. Genitourinary: Negative for dysuria and urgency. Neurological: Positive for headaches. Negative for dizziness, sensory change, speech change and focal weakness.        General appearance - alert, well appearing, and in no distress  Mental status - alert, oriented to person, place, and time  Mouth - mucous membranes moist, pharynx normal without lesions  Chest - clear to auscultation, no wheezes, rales or rhonchi, symmetric air entry  Heart - normal rate, regular rhythm, normal S1, S2, no murmurs, rubs, clicks or gallops  Abdomen - soft, nontender, nondistended, no masses or organomegaly  Neurological - alert, oriented, normal speech, no focal findings or movement disorder noted  Extremities - peripheral pulses normal, no pedal edema, no clubbing or cyanosis      Intake/Output Summary (Last 24 hours) at 09/21/18 1046  Last data filed at 09/21/18 4030   Gross per 24 hour   Intake              610 ml   Output              700 ml   Net              -90 ml         Medications:      sertraline  25 mg Oral Daily    torsemide  20 mg Oral Daily    isosorbide dinitrate  20 mg Oral TID    spironolactone  25 mg Oral Daily    amLODIPine  10 mg Oral Daily    atorvastatin  40 mg Oral Nightly    famotidine  20 mg Oral BID    ferrous sulfate  325 mg Oral BID WC    fluticasone  1 spray Nasal Daily    hydrALAZINE  75 mg Oral 3 times per day    cetirizine  10 mg Oral Daily    tiotropium  18 mcg Inhalation Daily    sodium chloride flush  10 mL Intravenous 2 times per day    enoxaparin  40 mg Subcutaneous Daily    mometasone-formoterol  2 puff Inhalation BID    gabapentin  800 mg Oral TID       Diagnostic Labs and Imaging    CBC: Recent Labs      09/19/18   0713  09/20/18   0617  09/21/18   0611   WBC  6.9  7.0  6.6   RBC  2.98*  2.90*  3.20*   HGB  8.1*  8.0*  8.7*   HCT  28.1*  27.7*  30.1*   MCV  94.3  95.5  94.1   RDW  13.3  13.3  13.2   PLT  235  263  253     BMP: Recent Labs      09/19/18   0713  09/20/18   0617  09/21/18   0611   NA  138  140  139   K  3.5*  4.1  4.5   CL  95*  100  99   CO2  32*  27  30   BUN  29*  31*  31*   CREATININE  0.80  0.95*  1.03*     BNP: No results for input(s): BNP in the last 72 hours. PT/INR: No results for input(s): PROTIME, INR in the last 72 hours. APTT: No results for input(s): APTT in the last 72 hours. CARDIAC ENZYMES: No results for input(s): CKMB, CKMBINDEX, TROPONINI in the last 72 hours. Invalid input(s): CKTOTAL;3  FASTING LIPID PANEL:  Lab Results   Component Value Date    TRIG 102 02/15/2018     LIVER PROFILE: No results for input(s): AST, ALT, ALB, BILIDIR, BILITOT, ALKPHOS in the last 72 hours.      Xr Chest Standard (2 Vw)    Result Date: 9/18/2018  EXAMINATION: TWO VIEWS OF THE CHEST 9/18/2018 9:41 am COMPARISON: 09/15/2018 HISTORY: ORDERING SYSTEM PROVIDED HISTORY: chf TECHNOLOGIST PROVIDED HISTORY: chf Unknown acuity, ongoing exam FINDINGS: Cardiomegaly. No definite pleural effusion, with body habitus limits evaluation. No pneumothorax. The pulmonary vasculature appears congested. Cardiomegaly with mild pulmonary vascular congestion, similar to prior exam. No definite effusions are seen. Xr Chest Portable    Result Date: 9/15/2018  EXAMINATION: SINGLE XRAY VIEW OF THE CHEST 9/15/2018 4:55 pm COMPARISON: February 19, 2018 and February 17, 2018 HISTORY: ORDERING SYSTEM PROVIDED HISTORY: sob TECHNOLOGIST PROVIDED HISTORY: sob FINDINGS: Diminished penetration through the soft tissue is noted. Cardiac silhouette enlargement is again noted. The tracheostomy tube is no longer present. No focal area of consolidation is identified. No evidence for pneumothorax or convincing evidence for interstitial edema. No effusion identified. The osseous structures appear unchanged. Diminished penetration through soft tissue without convincing evidence for acute airspace disease. Cardiac silhouette enlargement is again noted. Assessment and Plan:   Principal Problem:    Acute on chronic diastolic CHF (congestive heart failure) ([facility])  Active Problems: Morbid obesity (HCC)    Obesity hypoventilation syndrome (HCC)    SOB (shortness of breath)    COPD (chronic obstructive pulmonary disease) ([facility])    Status post tracheostomy ([address]    Chronic respiratory failure (HCC)    Acute on chronic congestive heart failure ([address]  Resolved Problems:    * No resolved hospital problems. *      1. Acute on chronic cor pulmonale, obesity hypoventilation syndrome - continue BIPAP at night and as needed during day, continue torsemide 20 mg daily, fluid restricted diet. Needs to get outpatient sleep study  2. Morbid obesity - need to follow with bariatric surgery as outpatient  3.  Continue spiriva, dulera and albuterol as needed for COPD  4. Continue hydralazine 75 TID, norvasc 10, discontinued lisinopril  5. lovenox for DVT prophylaxis  6. Discharge to rehab once precert approved      Ana Maria Esteves MD  PGY1, Internal Medicine Resident  [facility], [facility]  9/21/2018, 10:46 AM   Attending Physician Statement  I have discussed the care of [address] [place] [address], including pertinent history and exam findings,  with the resident. I have seen and examined the patient and the key elements of all parts of the encounter have been performed by me. I agree with the assessment, plan and orders as documented by the resident with additions . Clinically stable. She has obesity ,hypoventilation syndrome with overlap syndrome  She has been ch. Smoker. Pco2 is high  BPAP is very helpful.will get her BPAP  Sleep study arranged  Questions answered. Advised to get Flue and pneumococcal vaccination. Treatment plan Discussed with nursing staff in detail , all questions answered . Electronically signed by Bailee Price MD on   9/21/18 at 8:30 PM    Please note that this chart was generated using voice recognition Dragon dictation software. Although every effort was made to ensure the accuracy of this automated transcription, some errors in transcription may have occurred.

## 2018-09-21 NOTE — PROGRESS NOTES
KARIE FARRIS, [facility]. Acute on chronic diastolic [facility] (congestive heart failure) [facility] [I50.33] . Vitals:    09/21/18 0833   BP: (!) 121/55   Pulse:    Resp:    Temp:    SpO2:    . Patients home meds are   Prior to Admission medications    Medication Sig Start Date End Date Taking? Authorizing Provider   isosorbide dinitrate (ISORDIL) 20 MG tablet Take 1 tablet by mouth 3 times daily 9/20/18  Yes Marilyn Kuo MD   mometasone-formoterol [facility]) 100-5 MCG/ACT inhaler Inhale 2 puffs into the lungs 2 times daily 9/20/18  Yes Marilyn Kuo MD   spironolactone (ALDACTONE) 25 MG tablet Take 1 tablet by mouth daily 9/20/18  Yes Marilyn Kuo MD   torsemide (DEMADEX) 20 MG tablet Take 1 tablet by mouth daily 9/20/18  Yes Marilyn Kuo MD   hydrALAZINE (APRESOLINE) 25 MG tablet Take 1 tablet by mouth every 8 hours 9/20/18  Yes Marilyn Kuo MD   Blood Pressure KIT 1 Device by Does not apply route 2 times daily 9/20/18  Yes Wei Rodriguez MD   sertraline (ZOLOFT) 25 MG tablet Take 75 mg by mouth daily   Yes Historical Provider, MD   gabapentin (NEURONTIN) 800 MG tablet Take 800 mg by mouth 3 times daily. .   Yes Historical Provider, MD   atorvastatin (LIPITOR) 40 MG tablet Take 1 tablet by mouth nightly 2/21/18   Keri Cottrell MD   cloNIDine (CATAPRES) 0.1 MG tablet Take 1 tablet by mouth 3 times daily as needed (SBP>160) 2/21/18   Keri Cottrell MD   famotidine (PEPCID) 20 MG tablet Take 1 tablet by mouth 2 times daily 2/21/18   Keri Cottrell MD   tiotropium (SPIRIVA) 18 MCG inhalation capsule Inhale 18 mcg into the lungs daily    Historical Provider, MD   albuterol sulfate  (90 Base) MCG/ACT inhaler Inhale 2 puffs into the lungs every 6 hours as needed for Wheezing    Historical Provider, MD   ferrous sulfate 325 (65 Fe) MG tablet Take 325 mg by mouth 2 times daily (with meals)     Historical Provider, MD   amLODIPine (NORVASC) 10 MG tablet Take 10 mg by mouth daily    Historical Provider, MD   lisinopril (PRINIVIL;ZESTRIL) 20 MG tablet Take 20 mg by mouth daily    Historical Provider, MD   loratadine (CLARITIN) 10 MG capsule Take 10 mg by mouth daily    Historical Provider, MD   fluticasone (FLONASE) 50 MCG/ACT nasal spray 1 spray by Nasal route daily    Historical Provider, MD   .  Recent Surgical History: None = 0     Assessment     Peak Flow (asthma only)    Predicted: 0  Personal Best: 0  PEF 0  % Predicted 0  Peak Flow : not applicable = 0    MOQ2/KZU    FEV1 Predicted 0      FEV1 0    FEV1 % Predicted 0  FVC 0  IS volume 0  IBW 0    RR 16  Breath Sounds: cl/dim      · Bronchodilator assessment at level  1 home therapy  · Hyperinflation assessment at level   · Secretion Management assessment at level    ·   · [x]    Bronchodilator Assessment  BRONCHODILATOR ASSESSMENT SCORE  Score 0 1 2 3 4 5   Breath Sounds   []  Patient Baseline [x]  No Wheeze good aeration []  Faint, scattered wheezing, good aeration []  Expiratory Wheezing and or moderately diminished []  Insp/Exp wheeze and/or very diminished []  Insp/Exp and/ or marked distress   Respiratory Rate   [x]  Patient Baseline []  Less than 20 []  Less than 20 []  20-25 []  Greater than 25 []  Greater than 25   Peak flow % of Pred or PB [x]  NA   []  Greater than 90%  []  81-90% []  71-80% []  Less than or equal to 70%  or unable to perform []  Unable due to Respiratory Distress   Dyspnea re [x]  Patient Baseline [x]  No SOB [x]  No SOB []  SOB on exertion []  SOB min activity []  At rest/acute   e FEV% Predicted       [x]  NA []  Above 69%  []  Unable []  Above 60-69%  []  Unable []  Above 50-59%  []  Unable []  Above 35-49%  []  Unable []  Less than 35%  []  Unable                 []  Hyperinflation Assessment  Score 1 2 3   CXR and Breath Sounds   []  Clear []  No atelectasis  Basilar aeration []  Atelectasis or absent basilar breath sounds   Incentive Spirometry Volume  (Per IBW)

## 2018-09-23 NOTE — DISCHARGE SUMMARY
torsemide (DEMADEX) 20 MG tablet Take 1 tablet by mouth daily, Disp-30 tablet, R-1Normal      Blood Pressure KIT 2 TIMES DAILY Starting u 9/20/2018, Disp-1 kit, R-0, Normal         CONTINUE these medications which have CHANGED    Details   hydrALAZINE (APRESOLINE) 25 MG tablet Take 1 tablet by mouth every 8 hours, Disp-90 tablet, R-3Normal         CONTINUE these medications which have NOT CHANGED    Details   sertraline (ZOLOFT) 25 MG tablet Take 75 mg by mouth dailyHistorical Med      gabapentin (NEURONTIN) 800 MG tablet Take 800 mg by mouth 3 times daily. [place] Historical Med      atorvastatin (LIPITOR) 40 MG tablet Take 1 tablet by mouth nightly, Disp-30 tablet, R-3Normal      cloNIDine (CATAPRES) 0.1 MG tablet Take 1 tablet by mouth 3 times daily as needed (SBP>160), Disp-60 tablet, R-3Normal      famotidine (PEPCID) 20 MG tablet Take 1 tablet by mouth 2 times daily, Disp-60 tablet, R-3Normal      tiotropium (SPIRIVA) 18 MCG inhalation capsule Inhale 18 mcg into the lungs dailyHistorical Med      albuterol sulfate  (90 Base) MCG/ACT inhaler Inhale 2 puffs into the lungs every 6 hours as needed for WheezingHistorical Med      ferrous sulfate 325 (65 Fe) MG tablet Take 325 mg by mouth 2 times daily (with meals) Historical Med      amLODIPine (NORVASC) 10 MG tablet Take 10 mg by mouth dailyHistorical Med      loratadine (CLARITIN) 10 MG capsule Take 10 mg by mouth dailyHistorical Med      fluticasone (FLONASE) 50 MCG/ACT nasal spray 1 spray by Nasal route dailyHistorical Med         STOP taking these medications       ipratropium-albuterol (DUONEB) 0.5-2.5 (3) MG/3ML SOLN nebulizer solution Comments:   Reason for Stopping:         Glycopyrrolate 0.4 MG/2ML SOLN injection Comments:   Reason for Stopping:         nicotine (NICODERM CQ) 21 MG/24HR Comments:   Reason for Stopping:         doxycycline hyclate (VIBRA-TABS) 100 MG tablet Comments:   Reason for Stopping:         furosemide (LASIX) 20 MG tablet

## 2018-10-31 ENCOUNTER — APPOINTMENT (OUTPATIENT)
Dept: GENERAL RADIOLOGY | Age: 43
End: 2018-10-31
Payer: MEDICARE

## 2018-10-31 ENCOUNTER — HOSPITAL ENCOUNTER (EMERGENCY)
Age: 43
Discharge: HOME | End: 2018-10-31
Attending: EMERGENCY MEDICINE
Payer: MEDICARE

## 2018-10-31 VITALS
RESPIRATION RATE: 14 BRPM | OXYGEN SATURATION: 100 % | SYSTOLIC BLOOD PRESSURE: 171 MMHG | HEART RATE: 66 BPM | DIASTOLIC BLOOD PRESSURE: 66 MMHG | TEMPERATURE: 97.7 F

## 2018-10-31 DIAGNOSIS — R06.09 DYSPNEA ON EXERTION: Primary | ICD-10-CM

## 2018-10-31 DIAGNOSIS — R07.9 CHEST PAIN, UNSPECIFIED TYPE: ICD-10-CM

## 2018-10-31 LAB
ABSOLUTE EOS #: 0.25 K/UL (ref 0–0.44)
ABSOLUTE IMMATURE GRANULOCYTE: 0.04 K/UL (ref 0–0.3)
ABSOLUTE LYMPH #: 1.69 K/UL (ref 1.1–3.7)
ABSOLUTE MONO #: 0.5 K/UL (ref 0.1–1.2)
ANION GAP SERPL CALCULATED.3IONS-SCNC: 11 MMOL/L (ref 9–17)
BASOPHILS # BLD: 0 % (ref 0–2)
BASOPHILS ABSOLUTE: <0.03 K/UL (ref 0–0.2)
BNP INTERPRETATION: NORMAL
BUN BLDV-MCNC: 18 MG/DL (ref 6–20)
BUN/CREAT BLD: ABNORMAL (ref 9–20)
CALCIUM SERPL-MCNC: 8.9 MG/DL (ref 8.6–10.4)
CHLORIDE BLD-SCNC: 99 MMOL/L (ref 98–107)
CO2: 32 MMOL/L (ref 20–31)
CREAT SERPL-MCNC: 1.01 MG/DL (ref 0.5–0.9)
DIFFERENTIAL TYPE: ABNORMAL
EKG ATRIAL RATE: 69 BPM
EKG P AXIS: 17 DEGREES
EKG P-R INTERVAL: 152 MS
EKG Q-T INTERVAL: 420 MS
EKG QRS DURATION: 86 MS
EKG QTC CALCULATION (BAZETT): 450 MS
EKG R AXIS: 16 DEGREES
EKG T AXIS: 6 DEGREES
EKG VENTRICULAR RATE: 69 BPM
EOSINOPHILS RELATIVE PERCENT: 4 % (ref 1–4)
GFR AFRICAN AMERICAN: >60 ML/MIN
GFR NON-AFRICAN AMERICAN: 60 ML/MIN
GFR SERPL CREATININE-BSD FRML MDRD: ABNORMAL ML/MIN/{1.73_M2}
GFR SERPL CREATININE-BSD FRML MDRD: ABNORMAL ML/MIN/{1.73_M2}
GLUCOSE BLD-MCNC: 139 MG/DL (ref 70–99)
HCT VFR BLD CALC: 31.3 % (ref 36.3–47.1)
HEMOGLOBIN: 9.1 G/DL (ref 11.9–15.1)
IMMATURE GRANULOCYTES: 1 %
LYMPHOCYTES # BLD: 24 % (ref 24–43)
MCH RBC QN AUTO: 27.2 PG (ref 25.2–33.5)
MCHC RBC AUTO-ENTMCNC: 29.1 G/DL (ref 28.4–34.8)
MCV RBC AUTO: 93.7 FL (ref 82.6–102.9)
MONOCYTES # BLD: 7 % (ref 3–12)
NRBC AUTOMATED: 0 PER 100 WBC
PDW BLD-RTO: 12.8 % (ref 11.8–14.4)
PLATELET # BLD: 296 K/UL (ref 138–453)
PLATELET ESTIMATE: ABNORMAL
PMV BLD AUTO: 9.8 FL (ref 8.1–13.5)
POC TROPONIN I: 0 NG/ML (ref 0–0.1)
POC TROPONIN I: 0 NG/ML (ref 0–0.1)
POC TROPONIN INTERP: NORMAL
POC TROPONIN INTERP: NORMAL
POTASSIUM SERPL-SCNC: 3.8 MMOL/L (ref 3.7–5.3)
PRO-BNP: 174 PG/ML
RBC # BLD: 3.34 M/UL (ref 3.95–5.11)
RBC # BLD: ABNORMAL 10*6/UL
SEG NEUTROPHILS: 64 % (ref 36–65)
SEGMENTED NEUTROPHILS ABSOLUTE COUNT: 4.62 K/UL (ref 1.5–8.1)
SODIUM BLD-SCNC: 142 MMOL/L (ref 135–144)
WBC # BLD: 7.1 K/UL (ref 3.5–11.3)
WBC # BLD: ABNORMAL 10*3/UL

## 2018-10-31 PROCEDURE — 83880 ASSAY OF NATRIURETIC PEPTIDE: CPT

## 2018-10-31 PROCEDURE — 93005 ELECTROCARDIOGRAM TRACING: CPT

## 2018-10-31 PROCEDURE — 71046 X-RAY EXAM CHEST 2 VIEWS: CPT

## 2018-10-31 PROCEDURE — 96374 THER/PROPH/DIAG INJ IV PUSH: CPT

## 2018-10-31 PROCEDURE — 84484 ASSAY OF TROPONIN QUANT: CPT

## 2018-10-31 PROCEDURE — 80048 BASIC METABOLIC PNL TOTAL CA: CPT

## 2018-10-31 PROCEDURE — 99285 EMERGENCY DEPT VISIT HI MDM: CPT

## 2018-10-31 PROCEDURE — 85025 COMPLETE CBC W/AUTO DIFF WBC: CPT

## 2018-10-31 PROCEDURE — 6360000002 HC RX W HCPCS: Performed by: EMERGENCY MEDICINE

## 2018-10-31 RX ORDER — FUROSEMIDE 10 MG/ML
40 INJECTION INTRAMUSCULAR; INTRAVENOUS ONCE
Status: COMPLETED | OUTPATIENT
Start: 2018-10-31 | End: 2018-10-31

## 2018-10-31 RX ORDER — POTASSIUM CHLORIDE 20 MEQ/1
20 TABLET, EXTENDED RELEASE ORAL ONCE
Status: DISCONTINUED | OUTPATIENT
Start: 2018-10-31 | End: 2018-10-31 | Stop reason: HOSPADM

## 2018-10-31 RX ADMIN — FUROSEMIDE 40 MG: 10 INJECTION, SOLUTION INTRAMUSCULAR; INTRAVENOUS at 13:36

## 2018-10-31 ASSESSMENT — PAIN SCALES - GENERAL: PAINLEVEL_OUTOF10: 6

## 2018-10-31 ASSESSMENT — PAIN DESCRIPTION - ORIENTATION: ORIENTATION: RIGHT;UPPER

## 2018-10-31 ASSESSMENT — PAIN DESCRIPTION - FREQUENCY: FREQUENCY: INTERMITTENT

## 2018-10-31 ASSESSMENT — PAIN DESCRIPTION - DESCRIPTORS: DESCRIPTORS: THROBBING

## 2018-10-31 ASSESSMENT — PAIN DESCRIPTION - ONSET: ONSET: GRADUAL

## 2018-10-31 ASSESSMENT — PAIN DESCRIPTION - PROGRESSION: CLINICAL_PROGRESSION: GRADUALLY WORSENING

## 2018-10-31 ASSESSMENT — PAIN DESCRIPTION - LOCATION: LOCATION: CHEST

## 2018-10-31 ASSESSMENT — PAIN DESCRIPTION - PAIN TYPE: TYPE: ACUTE PAIN

## 2018-10-31 NOTE — ED PROVIDER NOTES
[address] [facility]     Emergency Department     Faculty Attestation    I performed a history and physical examination of the patient and discussed management with the resident. I have reviewed and agree with the residents findings including all diagnostic interpretations, and treatment plans as written. Any areas of disagreement are noted on the chart. I was personally present for the key portions of any procedures. I have documented in the chart those procedures where I was not present during the key portions. I have reviewed the emergency nurses triage note. I agree with the chief complaint, past medical history, past surgical history, allergies, medications, social and family history as documented unless otherwise noted below. Documentation of the HPI, Physical Exam and Medical Decision Making performed by [email or web address] is based on my personal performance of the HPI, PE and MDM. For Physician Assistant/ Nurse Practitioner cases/documentation I have personally evaluated this patient and have completed at least one if not all key elements of the E/M (history, physical exam, and MDM). Additional findings are as noted. Primary Care Physician: Coretta Estrada DO    History: This is a 37 y.o. female who presents to the Emergency Department with complaint of worsening LE swelling, and MONIQUE. Pateint reports h/o COPD, and CHF, has been on diuretics in the past, but about a week ago has some HIEN and was pulled off one of her diuretics, and that is when she started to noted worsening swelling. She denies SOB at rest, but dypsnea on exertion. Spoke with cardiology today and was told to come to ER to get IV lasix    Physical:   oral temperature is 97.7 °F (36.5 °C). Her blood pressure is 139/70 and her pulse is 72. Her respiration is 22 and oxygen saturation is 100%. Patient is morbidly obese, not showing any signs of respiratory distress to speak in full sentences.   Her

## 2018-10-31 NOTE — ED TRIAGE NOTES
Pt to room 19 from Triage area for evaluation of shortness of breath, chest pain to right side of chest, and right sided chest and shoulder pain. Pt reports that the shortness of breath has been intermittent for weeks, states that it becomes worse with ambulation, pt reports cough. Lung sounds present to uppers, dimnished lowers. Pt also reports that she has had about a 20 lb weight scar over the last 2 weeks due to water retention. Pt states that her physician has recently changed diuretics. Pt noted to have some edema to lower extremities. Pt placed on EKG, NIBP and SPO2. Call light within reach, Will continue to monitor.

## 2018-11-16 ENCOUNTER — APPOINTMENT (OUTPATIENT)
Dept: GENERAL RADIOLOGY | Age: 43
DRG: 194 | End: 2018-11-16
Payer: MEDICARE

## 2018-11-16 ENCOUNTER — HOSPITAL ENCOUNTER (INPATIENT)
Age: 43
LOS: 5 days | Discharge: HOME HEALTH | DRG: 194 | End: 2018-11-21
Attending: EMERGENCY MEDICINE | Admitting: FAMILY MEDICINE
Payer: MEDICARE

## 2018-11-16 DIAGNOSIS — I50.33 ACUTE ON CHRONIC DIASTOLIC CHF (CONGESTIVE HEART FAILURE) (HCC): ICD-10-CM

## 2018-11-16 DIAGNOSIS — N17.9 AKI (ACUTE KIDNEY INJURY) (HCC): ICD-10-CM

## 2018-11-16 DIAGNOSIS — I50.9 ACUTE ON CHRONIC CONGESTIVE HEART FAILURE, UNSPECIFIED HEART FAILURE TYPE (HCC): Primary | ICD-10-CM

## 2018-11-16 DIAGNOSIS — E66.01 MORBID OBESITY (HCC): ICD-10-CM

## 2018-11-16 PROBLEM — I27.20 PULMONARY HYPERTENSION (HCC): Status: ACTIVE | Noted: 2018-11-16

## 2018-11-16 PROBLEM — Z79.899 CHRONICALLY ON BENZODIAZEPINE THERAPY: Status: ACTIVE | Noted: 2018-11-16

## 2018-11-16 PROBLEM — F11.20 CHRONIC NARCOTIC DEPENDENCE (HCC): Status: ACTIVE | Noted: 2018-11-16

## 2018-11-16 PROBLEM — R10.13 EPIGASTRIC PAIN: Status: ACTIVE | Noted: 2018-11-16

## 2018-11-16 PROBLEM — G62.9 NEUROPATHY: Status: ACTIVE | Noted: 2018-11-16

## 2018-11-16 LAB
ABSOLUTE EOS #: 0.21 K/UL (ref 0–0.44)
ABSOLUTE IMMATURE GRANULOCYTE: 0.06 K/UL (ref 0–0.3)
ABSOLUTE LYMPH #: 0.78 K/UL (ref 1.1–3.7)
ABSOLUTE MONO #: 0.35 K/UL (ref 0.1–1.2)
ALBUMIN SERPL-MCNC: 3.6 G/DL (ref 3.5–5.2)
ALBUMIN/GLOBULIN RATIO: 1 (ref 1–2.5)
ALP BLD-CCNC: 148 U/L (ref 35–104)
ALT SERPL-CCNC: 29 U/L (ref 5–33)
ANION GAP SERPL CALCULATED.3IONS-SCNC: 12 MMOL/L (ref 9–17)
AST SERPL-CCNC: 22 U/L
BASOPHILS # BLD: 0 % (ref 0–2)
BASOPHILS ABSOLUTE: <0.03 K/UL (ref 0–0.2)
BILIRUB SERPL-MCNC: 0.29 MG/DL (ref 0.3–1.2)
BILIRUBIN DIRECT: <0.08 MG/DL
BILIRUBIN, INDIRECT: ABNORMAL MG/DL (ref 0–1)
BNP INTERPRETATION: ABNORMAL
BUN BLDV-MCNC: 13 MG/DL (ref 6–20)
BUN/CREAT BLD: ABNORMAL (ref 9–20)
CALCIUM SERPL-MCNC: 8.5 MG/DL (ref 8.6–10.4)
CHLORIDE BLD-SCNC: 101 MMOL/L (ref 98–107)
CO2: 28 MMOL/L (ref 20–31)
CREAT SERPL-MCNC: 0.71 MG/DL (ref 0.5–0.9)
DIFFERENTIAL TYPE: ABNORMAL
EKG ATRIAL RATE: 92 BPM
EKG P AXIS: 66 DEGREES
EKG P-R INTERVAL: 142 MS
EKG Q-T INTERVAL: 364 MS
EKG QRS DURATION: 76 MS
EKG QTC CALCULATION (BAZETT): 450 MS
EKG R AXIS: 4 DEGREES
EKG T AXIS: 6 DEGREES
EKG VENTRICULAR RATE: 92 BPM
EOSINOPHILS RELATIVE PERCENT: 3 % (ref 1–4)
GFR AFRICAN AMERICAN: >60 ML/MIN
GFR NON-AFRICAN AMERICAN: >60 ML/MIN
GFR SERPL CREATININE-BSD FRML MDRD: ABNORMAL ML/MIN/{1.73_M2}
GFR SERPL CREATININE-BSD FRML MDRD: ABNORMAL ML/MIN/{1.73_M2}
GLOBULIN: ABNORMAL G/DL (ref 1.5–3.8)
GLUCOSE BLD-MCNC: 111 MG/DL (ref 70–99)
HCT VFR BLD CALC: 28.7 % (ref 36.3–47.1)
HEMOGLOBIN: 8.7 G/DL (ref 11.9–15.1)
IMMATURE GRANULOCYTES: 1 %
LIPASE: 13 U/L (ref 13–60)
LYMPHOCYTES # BLD: 11 % (ref 24–43)
MCH RBC QN AUTO: 27.6 PG (ref 25.2–33.5)
MCHC RBC AUTO-ENTMCNC: 30.3 G/DL (ref 28.4–34.8)
MCV RBC AUTO: 91.1 FL (ref 82.6–102.9)
MONOCYTES # BLD: 5 % (ref 3–12)
NRBC AUTOMATED: 0 PER 100 WBC
PDW BLD-RTO: 13.2 % (ref 11.8–14.4)
PLATELET # BLD: 251 K/UL (ref 138–453)
PLATELET ESTIMATE: ABNORMAL
PMV BLD AUTO: 9.8 FL (ref 8.1–13.5)
POC TROPONIN I: 0 NG/ML (ref 0–0.1)
POC TROPONIN I: 0 NG/ML (ref 0–0.1)
POC TROPONIN INTERP: NORMAL
POC TROPONIN INTERP: NORMAL
POTASSIUM SERPL-SCNC: 5.1 MMOL/L (ref 3.7–5.3)
PRO-BNP: 481 PG/ML
RBC # BLD: 3.15 M/UL (ref 3.95–5.11)
RBC # BLD: ABNORMAL 10*6/UL
SEG NEUTROPHILS: 80 % (ref 36–65)
SEGMENTED NEUTROPHILS ABSOLUTE COUNT: 5.92 K/UL (ref 1.5–8.1)
SODIUM BLD-SCNC: 141 MMOL/L (ref 135–144)
TOTAL PROTEIN: 7.2 G/DL (ref 6.4–8.3)
TROPONIN INTERP: NORMAL
TROPONIN INTERP: NORMAL
TROPONIN T: <0.03 NG/ML
TROPONIN T: <0.03 NG/ML
WBC # BLD: 7.3 K/UL (ref 3.5–11.3)
WBC # BLD: ABNORMAL 10*3/UL

## 2018-11-16 PROCEDURE — 6370000000 HC RX 637 (ALT 250 FOR IP): Performed by: STUDENT IN AN ORGANIZED HEALTH CARE EDUCATION/TRAINING PROGRAM

## 2018-11-16 PROCEDURE — 85025 COMPLETE CBC W/AUTO DIFF WBC: CPT

## 2018-11-16 PROCEDURE — 2060000000 HC ICU INTERMEDIATE R&B

## 2018-11-16 PROCEDURE — 94660 CPAP INITIATION&MGMT: CPT

## 2018-11-16 PROCEDURE — 36416 COLLJ CAPILLARY BLOOD SPEC: CPT

## 2018-11-16 PROCEDURE — 80048 BASIC METABOLIC PNL TOTAL CA: CPT

## 2018-11-16 PROCEDURE — 6360000002 HC RX W HCPCS: Performed by: FAMILY MEDICINE

## 2018-11-16 PROCEDURE — 99223 1ST HOSP IP/OBS HIGH 75: CPT | Performed by: FAMILY MEDICINE

## 2018-11-16 PROCEDURE — 99285 EMERGENCY DEPT VISIT HI MDM: CPT

## 2018-11-16 PROCEDURE — 84484 ASSAY OF TROPONIN QUANT: CPT

## 2018-11-16 PROCEDURE — 36415 COLL VENOUS BLD VENIPUNCTURE: CPT

## 2018-11-16 PROCEDURE — 6370000000 HC RX 637 (ALT 250 FOR IP): Performed by: FAMILY MEDICINE

## 2018-11-16 PROCEDURE — 83880 ASSAY OF NATRIURETIC PEPTIDE: CPT

## 2018-11-16 PROCEDURE — 2580000003 HC RX 258: Performed by: FAMILY MEDICINE

## 2018-11-16 PROCEDURE — 83690 ASSAY OF LIPASE: CPT

## 2018-11-16 PROCEDURE — 96375 TX/PRO/DX INJ NEW DRUG ADDON: CPT

## 2018-11-16 PROCEDURE — 2700000000 HC OXYGEN THERAPY PER DAY

## 2018-11-16 PROCEDURE — 96376 TX/PRO/DX INJ SAME DRUG ADON: CPT

## 2018-11-16 PROCEDURE — 80076 HEPATIC FUNCTION PANEL: CPT

## 2018-11-16 PROCEDURE — 94762 N-INVAS EAR/PLS OXIMTRY CONT: CPT

## 2018-11-16 PROCEDURE — 96374 THER/PROPH/DIAG INJ IV PUSH: CPT

## 2018-11-16 PROCEDURE — 6360000002 HC RX W HCPCS: Performed by: STUDENT IN AN ORGANIZED HEALTH CARE EDUCATION/TRAINING PROGRAM

## 2018-11-16 PROCEDURE — 93005 ELECTROCARDIOGRAM TRACING: CPT

## 2018-11-16 PROCEDURE — 94640 AIRWAY INHALATION TREATMENT: CPT

## 2018-11-16 PROCEDURE — 71045 X-RAY EXAM CHEST 1 VIEW: CPT

## 2018-11-16 RX ORDER — SPIRONOLACTONE 25 MG/1
25 TABLET ORAL DAILY
Status: DISCONTINUED | OUTPATIENT
Start: 2018-11-16 | End: 2018-11-21 | Stop reason: HOSPADM

## 2018-11-16 RX ORDER — OXYCODONE AND ACETAMINOPHEN 10; 325 MG/1; MG/1
1 TABLET ORAL EVERY 6 HOURS PRN
Status: ON HOLD | COMMUNITY
End: 2022-01-01 | Stop reason: SDUPTHER

## 2018-11-16 RX ORDER — NICOTINE 21 MG/24HR
1 PATCH, TRANSDERMAL 24 HOURS TRANSDERMAL DAILY PRN
Status: DISCONTINUED | OUTPATIENT
Start: 2018-11-16 | End: 2018-11-21 | Stop reason: HOSPADM

## 2018-11-16 RX ORDER — FAMOTIDINE 20 MG/1
20 TABLET, FILM COATED ORAL 2 TIMES DAILY
Status: DISCONTINUED | OUTPATIENT
Start: 2018-11-16 | End: 2018-11-16

## 2018-11-16 RX ORDER — LISINOPRIL 5 MG/1
5 TABLET ORAL DAILY
Status: DISCONTINUED | OUTPATIENT
Start: 2018-11-17 | End: 2018-11-20

## 2018-11-16 RX ORDER — SODIUM CHLORIDE 0.9 % (FLUSH) 0.9 %
10 SYRINGE (ML) INJECTION PRN
Status: DISCONTINUED | OUTPATIENT
Start: 2018-11-16 | End: 2018-11-21 | Stop reason: HOSPADM

## 2018-11-16 RX ORDER — PANTOPRAZOLE SODIUM 40 MG/1
40 TABLET, DELAYED RELEASE ORAL
Status: DISCONTINUED | OUTPATIENT
Start: 2018-11-16 | End: 2018-11-21 | Stop reason: HOSPADM

## 2018-11-16 RX ORDER — ONDANSETRON 2 MG/ML
4 INJECTION INTRAMUSCULAR; INTRAVENOUS ONCE
Status: COMPLETED | OUTPATIENT
Start: 2018-11-16 | End: 2018-11-16

## 2018-11-16 RX ORDER — DOCUSATE SODIUM 100 MG/1
100 CAPSULE, LIQUID FILLED ORAL 2 TIMES DAILY
Status: DISCONTINUED | OUTPATIENT
Start: 2018-11-16 | End: 2018-11-21 | Stop reason: HOSPADM

## 2018-11-16 RX ORDER — OXYCODONE AND ACETAMINOPHEN 10; 325 MG/1; MG/1
1 TABLET ORAL EVERY 6 HOURS PRN
Status: DISCONTINUED | OUTPATIENT
Start: 2018-11-16 | End: 2018-11-16

## 2018-11-16 RX ORDER — BISACODYL 10 MG
10 SUPPOSITORY, RECTAL RECTAL DAILY PRN
Status: DISCONTINUED | OUTPATIENT
Start: 2018-11-16 | End: 2018-11-21 | Stop reason: HOSPADM

## 2018-11-16 RX ORDER — GABAPENTIN 400 MG/1
400 CAPSULE ORAL 3 TIMES DAILY
Status: ON HOLD | COMMUNITY
End: 2018-11-16

## 2018-11-16 RX ORDER — HYDRALAZINE HYDROCHLORIDE 25 MG/1
25 TABLET, FILM COATED ORAL EVERY 8 HOURS SCHEDULED
Status: DISCONTINUED | OUTPATIENT
Start: 2018-11-16 | End: 2018-11-21 | Stop reason: HOSPADM

## 2018-11-16 RX ORDER — ISOSORBIDE DINITRATE 10 MG/1
20 TABLET ORAL
Status: DISCONTINUED | OUTPATIENT
Start: 2018-11-16 | End: 2018-11-21 | Stop reason: HOSPADM

## 2018-11-16 RX ORDER — OXYCODONE HYDROCHLORIDE 5 MG/1
10 TABLET ORAL ONCE
Status: COMPLETED | OUTPATIENT
Start: 2018-11-16 | End: 2018-11-16

## 2018-11-16 RX ORDER — GABAPENTIN 400 MG/1
800 CAPSULE ORAL 3 TIMES DAILY
Status: DISCONTINUED | OUTPATIENT
Start: 2018-11-16 | End: 2018-11-16

## 2018-11-16 RX ORDER — ONDANSETRON 2 MG/ML
4 INJECTION INTRAMUSCULAR; INTRAVENOUS EVERY 6 HOURS PRN
Status: DISCONTINUED | OUTPATIENT
Start: 2018-11-16 | End: 2018-11-21 | Stop reason: HOSPADM

## 2018-11-16 RX ORDER — LORAZEPAM 1 MG/1
1 TABLET ORAL DAILY PRN
Status: DISCONTINUED | OUTPATIENT
Start: 2018-11-16 | End: 2018-11-21 | Stop reason: HOSPADM

## 2018-11-16 RX ORDER — FUROSEMIDE 10 MG/ML
40 INJECTION INTRAMUSCULAR; INTRAVENOUS 2 TIMES DAILY
Status: DISCONTINUED | OUTPATIENT
Start: 2018-11-16 | End: 2018-11-17

## 2018-11-16 RX ORDER — OXYCODONE HYDROCHLORIDE AND ACETAMINOPHEN 5; 325 MG/1; MG/1
1 TABLET ORAL EVERY 6 HOURS PRN
Status: DISCONTINUED | OUTPATIENT
Start: 2018-11-16 | End: 2018-11-21 | Stop reason: HOSPADM

## 2018-11-16 RX ORDER — CLONIDINE HYDROCHLORIDE 0.1 MG/1
0.1 TABLET ORAL 3 TIMES DAILY PRN
Status: DISCONTINUED | OUTPATIENT
Start: 2018-11-16 | End: 2018-11-21 | Stop reason: HOSPADM

## 2018-11-16 RX ORDER — GABAPENTIN 800 MG/1
800 TABLET ORAL 3 TIMES DAILY
Status: DISCONTINUED | OUTPATIENT
Start: 2018-11-16 | End: 2018-11-18

## 2018-11-16 RX ORDER — OXYCODONE HYDROCHLORIDE 5 MG/1
5 TABLET ORAL EVERY 6 HOURS PRN
Status: DISCONTINUED | OUTPATIENT
Start: 2018-11-16 | End: 2018-11-21 | Stop reason: HOSPADM

## 2018-11-16 RX ORDER — SODIUM CHLORIDE 0.9 % (FLUSH) 0.9 %
10 SYRINGE (ML) INJECTION EVERY 12 HOURS SCHEDULED
Status: DISCONTINUED | OUTPATIENT
Start: 2018-11-16 | End: 2018-11-21 | Stop reason: HOSPADM

## 2018-11-16 RX ORDER — HEPARIN SODIUM 5000 [USP'U]/ML
5000 INJECTION, SOLUTION INTRAVENOUS; SUBCUTANEOUS EVERY 8 HOURS SCHEDULED
Status: DISCONTINUED | OUTPATIENT
Start: 2018-11-16 | End: 2018-11-21 | Stop reason: HOSPADM

## 2018-11-16 RX ORDER — ACETAMINOPHEN 325 MG/1
650 TABLET ORAL EVERY 4 HOURS PRN
Status: DISCONTINUED | OUTPATIENT
Start: 2018-11-16 | End: 2018-11-21 | Stop reason: HOSPADM

## 2018-11-16 RX ORDER — FLUTICASONE PROPIONATE 50 MCG
1 SPRAY, SUSPENSION (ML) NASAL DAILY
Status: DISCONTINUED | OUTPATIENT
Start: 2018-11-16 | End: 2018-11-21 | Stop reason: HOSPADM

## 2018-11-16 RX ORDER — AMLODIPINE BESYLATE 10 MG/1
10 TABLET ORAL DAILY
Status: DISCONTINUED | OUTPATIENT
Start: 2018-11-16 | End: 2018-11-20

## 2018-11-16 RX ORDER — LANOLIN ALCOHOL/MO/W.PET/CERES
325 CREAM (GRAM) TOPICAL 2 TIMES DAILY WITH MEALS
Status: DISCONTINUED | OUTPATIENT
Start: 2018-11-16 | End: 2018-11-21 | Stop reason: HOSPADM

## 2018-11-16 RX ORDER — LORAZEPAM 1 MG/1
1 TABLET ORAL EVERY 6 HOURS PRN
COMMUNITY
End: 2019-10-28 | Stop reason: SDUPTHER

## 2018-11-16 RX ORDER — GABAPENTIN 800 MG/1
800 TABLET ORAL 3 TIMES DAILY
COMMUNITY
End: 2019-10-28 | Stop reason: SDUPTHER

## 2018-11-16 RX ORDER — FUROSEMIDE 10 MG/ML
40 INJECTION INTRAMUSCULAR; INTRAVENOUS ONCE
Status: COMPLETED | OUTPATIENT
Start: 2018-11-16 | End: 2018-11-16

## 2018-11-16 RX ORDER — ATORVASTATIN CALCIUM 40 MG/1
40 TABLET, FILM COATED ORAL NIGHTLY
Status: DISCONTINUED | OUTPATIENT
Start: 2018-11-16 | End: 2018-11-21 | Stop reason: HOSPADM

## 2018-11-16 RX ADMIN — GABAPENTIN 800 MG: 800 TABLET, FILM COATED ORAL at 19:44

## 2018-11-16 RX ADMIN — HYDRALAZINE HYDROCHLORIDE 25 MG: 25 TABLET, FILM COATED ORAL at 21:59

## 2018-11-16 RX ADMIN — FUROSEMIDE 40 MG: 10 INJECTION, SOLUTION INTRAMUSCULAR; INTRAVENOUS at 15:40

## 2018-11-16 RX ADMIN — SPIRONOLACTONE 25 MG: 25 TABLET ORAL at 21:59

## 2018-11-16 RX ADMIN — FERROUS SULFATE TAB EC 325 MG (65 MG FE EQUIVALENT) 325 MG: 325 (65 FE) TABLET DELAYED RESPONSE at 21:10

## 2018-11-16 RX ADMIN — ONDANSETRON 4 MG: 2 INJECTION INTRAMUSCULAR; INTRAVENOUS at 11:07

## 2018-11-16 RX ADMIN — FLUTICASONE PROPIONATE 1 SPRAY: 50 SPRAY, METERED NASAL at 19:46

## 2018-11-16 RX ADMIN — SERTRALINE 75 MG: 50 TABLET, FILM COATED ORAL at 21:10

## 2018-11-16 RX ADMIN — ONDANSETRON HYDROCHLORIDE 4 MG: 2 INJECTION, SOLUTION INTRAMUSCULAR; INTRAVENOUS at 15:24

## 2018-11-16 RX ADMIN — ISOSORBIDE DINITRATE 20 MG: 10 TABLET ORAL at 21:10

## 2018-11-16 RX ADMIN — ATORVASTATIN CALCIUM 40 MG: 80 TABLET, FILM COATED ORAL at 21:13

## 2018-11-16 RX ADMIN — HEPARIN SODIUM 5000 UNITS: 5000 INJECTION, SOLUTION INTRAVENOUS; SUBCUTANEOUS at 19:54

## 2018-11-16 RX ADMIN — LORAZEPAM 1 MG: 1 TABLET ORAL at 23:32

## 2018-11-16 RX ADMIN — MOMETASONE FUROATE AND FORMOTEROL FUMARATE DIHYDRATE 2 PUFF: 100; 5 AEROSOL RESPIRATORY (INHALATION) at 20:10

## 2018-11-16 RX ADMIN — TIOTROPIUM BROMIDE 18 MCG: 18 CAPSULE ORAL; RESPIRATORY (INHALATION) at 20:10

## 2018-11-16 RX ADMIN — Medication 10 ML: at 21:11

## 2018-11-16 RX ADMIN — OXYCODONE HYDROCHLORIDE 10 MG: 5 TABLET ORAL at 12:39

## 2018-11-16 RX ADMIN — OXYCODONE HYDROCHLORIDE 5 MG: 5 TABLET ORAL at 19:42

## 2018-11-16 RX ADMIN — PANTOPRAZOLE SODIUM 40 MG: 40 TABLET, DELAYED RELEASE ORAL at 19:46

## 2018-11-16 RX ADMIN — OXYCODONE HYDROCHLORIDE AND ACETAMINOPHEN 1 TABLET: 5; 325 TABLET ORAL at 19:42

## 2018-11-16 RX ADMIN — ONDANSETRON HYDROCHLORIDE 4 MG: 2 INJECTION, SOLUTION INTRAMUSCULAR; INTRAVENOUS at 21:11

## 2018-11-16 RX ADMIN — FUROSEMIDE 40 MG: 10 INJECTION, SOLUTION INTRAMUSCULAR; INTRAVENOUS at 21:10

## 2018-11-16 ASSESSMENT — PAIN SCALES - GENERAL
PAINLEVEL_OUTOF10: 9
PAINLEVEL_OUTOF10: 9
PAINLEVEL_OUTOF10: 8
PAINLEVEL_OUTOF10: 8

## 2018-11-16 ASSESSMENT — ENCOUNTER SYMPTOMS
COLOR CHANGE: 0
ABDOMINAL PAIN: 1
DIARRHEA: 1
RHINORRHEA: 0
NAUSEA: 1
COUGH: 1
EYE REDNESS: 0
CHEST TIGHTNESS: 0
SHORTNESS OF BREATH: 1
EYE PAIN: 0
BLOOD IN STOOL: 0
CONSTIPATION: 0
EYE DISCHARGE: 0
BACK PAIN: 0
NAUSEA: 0
WHEEZING: 0
VOMITING: 0
CHEST TIGHTNESS: 1

## 2018-11-16 ASSESSMENT — PAIN DESCRIPTION - PAIN TYPE
TYPE: ACUTE PAIN
TYPE: ACUTE PAIN

## 2018-11-16 ASSESSMENT — PAIN DESCRIPTION - LOCATION
LOCATION: CHEST
LOCATION: ABDOMEN

## 2018-11-16 NOTE — DISCHARGE INSTR - COC
Electronically signed by Beck Ventura RN on 11/21/18 at 2:23 PM    CASE MANAGEMENT/SOCIAL WORK SECTION    Inpatient Status Date: ***    Readmission Risk Assessment Score:  Readmission Risk              Risk of Unplanned Readmission:        0           Discharging to Facility/ Agency   · Name: [facility]  Address:  [facility] [address] [place]  [address]        Phone: [phone]       Fax: [address]  [address] [place] [address]        Phone: [phone]       Fax: [phone]        ·   · Phone:  · Fax:    Dialysis Facility (if applicable)   · Name:  · Address:  · Dialysis Schedule:  · Phone:  · Fax:    / signature: Electronically signed by Lennie Munoz RN on 11/21/18 at 12:13 PM    PHYSICIAN SECTION    Prognosis: Fair    Condition at Discharge: Stable    Rehab Potential (if transferring to Rehab): Fair    Recommended Labs or Other Treatments After Discharge: BMP in 1 week . Daily weight monitoring   Fluid restriction 1800 ml   Salt restriction 1.5 gm     Physician Certification: I certify the above information and transfer of Gris Carrasquillo  is necessary for the continuing treatment of the diagnosis listed and that she requires [address] for greater 30 days.      Update Admission H&P: No change in H&P    PHYSICIAN SIGNATURE:  Electronically signed by Yennifer Sarabia MD on 11/21/18 at 11:42 AM

## 2018-11-16 NOTE — ED NOTES
Resident at bedside. Pt placed on cardiac monitor, pulse ox and BP cuff. Alarms on.       Shen Canales RN  11/16/18 2972

## 2018-11-16 NOTE — ED PROVIDER NOTES
Tracheotomy (02/2018); Gastrostomy tube placement (02/2018); and tracheostomy closure (03/2018). Social History     Social History    Marital status: Single     Spouse name: N/A    Number of children: N/A    Years of education: N/A     Occupational History    Not on file. Social History Main Topics    Smoking status: Former Smoker     Quit date: 1/15/2018    Smokeless tobacco: Never Used    Alcohol use No    Drug use: No    Sexual activity: Not on file     Other Topics Concern    Not on file     Social History Narrative    No narrative on file       History reviewed. No pertinent family history. Allergies:  Asa [aspirin]; Beta adrenergic blockers; and Sulfa antibiotics    Home Medications:  Prior to Admission medications    Medication Sig Start Date End Date Taking? Authorizing Provider   isosorbide dinitrate (ISORDIL) 20 MG tablet Take 1 tablet by mouth 3 times daily 9/20/18   Yoanna Dhaliwal MD   mometasone-formoterol [facility]) 100-5 MCG/ACT inhaler Inhale 2 puffs into the lungs 2 times daily 9/20/18   Yoanna Dhaliwal MD   spironolactone (ALDACTONE) 25 MG tablet Take 1 tablet by mouth daily 9/20/18   Yoanna Dhaliwal MD   torsemide (DEMADEX) 20 MG tablet Take 1 tablet by mouth daily 9/20/18   Yoanna Dhaliwal MD   hydrALAZINE (APRESOLINE) 25 MG tablet Take 1 tablet by mouth every 8 hours 9/20/18   Yoanna Dhaliwal MD   Blood Pressure KIT 1 Device by Does not apply route 2 times daily 9/20/18   Jose Luna MD   sertraline (ZOLOFT) 25 MG tablet Take 75 mg by mouth daily    Historical Provider, MD   gabapentin (NEURONTIN) 800 MG tablet Take 800 mg by mouth 3 times daily. Shaan Catsro     Historical Provider, MD   atorvastatin (LIPITOR) 40 MG tablet Take 1 tablet by mouth nightly 2/21/18   Ya Ford MD   cloNIDine (CATAPRES) 0.1 MG tablet Take 1 tablet by mouth 3 times daily as needed (SBP>160) 2/21/18   Ya Ford MD   famotidine (PEPCID) 20 MG tablet Take 1 consult to Heart Failure Nurse/Coordinator    Inpatient consult to Dietitian    OT eval and treat    PT evaluation and treat    Initiate Oxygen Therapy Protocol    Pulse Oximetry Spot Check    POCT troponin    POCT troponin    POCT troponin    EKG 12 Lead    PATIENT STATUS (FROM ED OR OR/PROCEDURAL) Inpatient       MEDICATIONS ORDERED:  Orders Placed This Encounter   Medications    ondansetron (ZOFRAN) injection 4 mg    furosemide (LASIX) injection 40 mg    oxyCODONE (ROXICODONE) immediate release tablet 10 mg    amLODIPine (NORVASC) tablet 10 mg    atorvastatin (LIPITOR) tablet 40 mg    cloNIDine (CATAPRES) tablet 0.1 mg    famotidine (PEPCID) tablet 20 mg    ferrous sulfate tablet 325 mg    fluticasone (FLONASE) 50 MCG/ACT nasal spray 1 spray    hydrALAZINE (APRESOLINE) tablet 25 mg    gabapentin (NEURONTIN) tablet 800 mg    isosorbide dinitrate (ISORDIL) tablet 20 mg    mometasone-formoterol (DULERA) 100-5 MCG/ACT inhaler 2 puff    sertraline (ZOLOFT) tablet 75 mg    spironolactone (ALDACTONE) tablet 25 mg    tiotropium (SPIRIVA) inhalation capsule 18 mcg    sodium chloride flush 0.9 % injection 10 mL    sodium chloride flush 0.9 % injection 10 mL    magnesium hydroxide (MILK OF MAGNESIA) 400 MG/5ML suspension 30 mL    docusate sodium (COLACE) capsule 100 mg    bisacodyl (DULCOLAX) suppository 10 mg    ondansetron (ZOFRAN) injection 4 mg    nicotine (NICODERM CQ) 21 MG/24HR 1 patch    enoxaparin (LOVENOX) injection 40 mg    acetaminophen (TYLENOL) tablet 650 mg    lisinopril (PRINIVIL;ZESTRIL) tablet 5 mg    furosemide (LASIX) injection 40 mg       DDX: COPD exacerbation, asthma, pneumothorax, anaphylaxis, anxiety, PE , pericardial effusion, CHF, ACS/MI, atelectasis, lower airway obstruction, aspiration    Initial MDM/Plan: 37 y.o. female who presents with Chest pain, shortness of breath, weight gain, history of CHF. We will check a BMP, his BNP, CBC, chest x-ray, EKG.   Plan interpreted by the Emergency Department Physician who either signs or Co-signs this chart in the absence of a cardiologist.      EMERGENCY DEPARTMENT COURSE:  ED Course as of Nov 16 1649 Fri Nov 16, 2018   1108 Hemoglobin Quant: (!) 8.7 [MS]      ED Course User Index  [MS] Manohar Marrero DO     Chest x-ray showed mild pulmonary congestion and edema. We will diurese with Lasix. Patient is volume overloaded. I discussed case with hospitalist service. They're agreeable to admission. Patient is agreeable to plan. We will treat pain with home dose of oxycodone. PROCEDURES:  None    CONSULTS:  IP CONSULT TO HOSPITALIST  IP CONSULT TO HEART FAILURE NURSE/COORDINATOR  IP CONSULT TO DIETITIAN    CRITICAL CARE:  Please seeattending note    FINAL IMPRESSION      1. Acute on chronic congestive heart failure, unspecified heart failure type ([facility]          DISPOSITION / PLAN     DISPOSITION      Admitted to hospitalist    PATIENTREFERRED TO:  Zaria Jasso DO  [facility]   [address] [id number]  [phone]            DISCHARGE MEDICATIONS:  Current Discharge Medication List          Manohar Marrero DO  EmergencyMedicine Resident    (Please note that portions of this note were completed with a voice recognition program.  Efforts were made to edit the dictations but occasionally words are mis-transcribed.)     Manohar Marrero DO  Resident  11/16/18 3409

## 2018-11-16 NOTE — CARE COORDINATION
Case Management Initial Discharge Plan  Giuliana Go,             Met with:patient to discuss discharge plans. Information verified: address, contacts, phone number, , insurance Yes  PCP: Aj Ventura DO  Date of last visit: Visiting physician seen on 2018    Insurance Provider: [employer]    Discharge Planning    Living Arrangements:  Children   Support Systems:  Family Members, Children, Parent    Home has 1 stories  3 stairs to climb to get into front door, none stairs to climb to reach second floor  Location of bedroom/bathroom in home first floor    Patient able to perform ADL's:Assisted    Current Services (outpatient & in home) [facility]  DME equipment: hospital bed, walker 2 wheeled, oxygen, w/c, commode  DME provider: Pharmacy Counter    Pharmacy: [address] Medications:     Does patient want to participate in local refill/ meds to beds program?       Potential Assistance Needed:       Patient agreeable to home care: Yes  Freedom of choice provided:  n/a    Prior SNF/Rehab Placement and Facility: Yes  Agreeable to SNF/Rehab: Yes  [place] of choice provided: n/a   Evaluation: no    Expected Discharge date:     Patient expects to be discharged to:  Home  Follow Up Appointment: Best Day/ Time:      Transportation provider: Son will provide transportation  Transportation arrangements needed for discharge: No    Readmission Risk              Risk of Unplanned Readmission:        0             Does patient have a readmission risk score greater than 14?: No  If yes, follow-up appointment must be made within 7 days of discharge. Discharge Plan: Discharge home with home care, has visiting physicians. Son will provide transportation home.           Electronically signed by Paola Prince RN on 18 at 1:28 PM

## 2018-11-17 ENCOUNTER — APPOINTMENT (OUTPATIENT)
Dept: GENERAL RADIOLOGY | Age: 43
DRG: 194 | End: 2018-11-17
Payer: MEDICARE

## 2018-11-17 LAB
ANION GAP SERPL CALCULATED.3IONS-SCNC: 12 MMOL/L (ref 9–17)
BNP INTERPRETATION: ABNORMAL
BUN BLDV-MCNC: 13 MG/DL (ref 6–20)
BUN/CREAT BLD: ABNORMAL (ref 9–20)
CALCIUM SERPL-MCNC: 8.3 MG/DL (ref 8.6–10.4)
CHLORIDE BLD-SCNC: 96 MMOL/L (ref 98–107)
CHOLESTEROL/HDL RATIO: 3.3
CHOLESTEROL: 144 MG/DL
CO2: 33 MMOL/L (ref 20–31)
CREAT SERPL-MCNC: 0.93 MG/DL (ref 0.5–0.9)
GFR AFRICAN AMERICAN: >60 ML/MIN
GFR NON-AFRICAN AMERICAN: >60 ML/MIN
GFR SERPL CREATININE-BSD FRML MDRD: ABNORMAL ML/MIN/{1.73_M2}
GFR SERPL CREATININE-BSD FRML MDRD: ABNORMAL ML/MIN/{1.73_M2}
GLUCOSE BLD-MCNC: 102 MG/DL (ref 70–99)
HCT VFR BLD CALC: 29.9 % (ref 36.3–47.1)
HDLC SERPL-MCNC: 43 MG/DL
HEMOGLOBIN: 8.8 G/DL (ref 11.9–15.1)
LDL CHOLESTEROL: 87 MG/DL (ref 0–130)
MAGNESIUM: 1.6 MG/DL (ref 1.6–2.6)
MCH RBC QN AUTO: 27.1 PG (ref 25.2–33.5)
MCHC RBC AUTO-ENTMCNC: 29.4 G/DL (ref 28.4–34.8)
MCV RBC AUTO: 92 FL (ref 82.6–102.9)
NRBC AUTOMATED: 0 PER 100 WBC
PDW BLD-RTO: 13.3 % (ref 11.8–14.4)
PLATELET # BLD: 225 K/UL (ref 138–453)
PMV BLD AUTO: 10.7 FL (ref 8.1–13.5)
POTASSIUM SERPL-SCNC: 3.8 MMOL/L (ref 3.7–5.3)
PRO-BNP: 548 PG/ML
RBC # BLD: 3.25 M/UL (ref 3.95–5.11)
SODIUM BLD-SCNC: 141 MMOL/L (ref 135–144)
TRIGL SERPL-MCNC: 68 MG/DL
TSH SERPL DL<=0.05 MIU/L-ACNC: 1.93 MIU/L (ref 0.3–5)
VLDLC SERPL CALC-MCNC: NORMAL MG/DL (ref 1–30)
WBC # BLD: 5.3 K/UL (ref 3.5–11.3)

## 2018-11-17 PROCEDURE — 6370000000 HC RX 637 (ALT 250 FOR IP): Performed by: FAMILY MEDICINE

## 2018-11-17 PROCEDURE — 6360000002 HC RX W HCPCS: Performed by: FAMILY MEDICINE

## 2018-11-17 PROCEDURE — 85027 COMPLETE CBC AUTOMATED: CPT

## 2018-11-17 PROCEDURE — 94640 AIRWAY INHALATION TREATMENT: CPT

## 2018-11-17 PROCEDURE — 84443 ASSAY THYROID STIM HORMONE: CPT

## 2018-11-17 PROCEDURE — 94762 N-INVAS EAR/PLS OXIMTRY CONT: CPT

## 2018-11-17 PROCEDURE — 71046 X-RAY EXAM CHEST 2 VIEWS: CPT

## 2018-11-17 PROCEDURE — 6360000002 HC RX W HCPCS: Performed by: INTERNAL MEDICINE

## 2018-11-17 PROCEDURE — 94660 CPAP INITIATION&MGMT: CPT

## 2018-11-17 PROCEDURE — 83735 ASSAY OF MAGNESIUM: CPT

## 2018-11-17 PROCEDURE — 2580000003 HC RX 258: Performed by: FAMILY MEDICINE

## 2018-11-17 PROCEDURE — 99232 SBSQ HOSP IP/OBS MODERATE 35: CPT | Performed by: FAMILY MEDICINE

## 2018-11-17 PROCEDURE — 80048 BASIC METABOLIC PNL TOTAL CA: CPT

## 2018-11-17 PROCEDURE — 80061 LIPID PANEL: CPT

## 2018-11-17 PROCEDURE — 6370000000 HC RX 637 (ALT 250 FOR IP): Performed by: INTERNAL MEDICINE

## 2018-11-17 PROCEDURE — 83880 ASSAY OF NATRIURETIC PEPTIDE: CPT

## 2018-11-17 PROCEDURE — 2060000000 HC ICU INTERMEDIATE R&B

## 2018-11-17 PROCEDURE — 36415 COLL VENOUS BLD VENIPUNCTURE: CPT

## 2018-11-17 PROCEDURE — 2700000000 HC OXYGEN THERAPY PER DAY

## 2018-11-17 RX ORDER — METOLAZONE 5 MG/1
5 TABLET ORAL DAILY
Status: DISCONTINUED | OUTPATIENT
Start: 2018-11-17 | End: 2018-11-19

## 2018-11-17 RX ORDER — FUROSEMIDE 10 MG/ML
40 INJECTION INTRAMUSCULAR; INTRAVENOUS 3 TIMES DAILY
Status: DISCONTINUED | OUTPATIENT
Start: 2018-11-17 | End: 2018-11-19

## 2018-11-17 RX ADMIN — GABAPENTIN 800 MG: 800 TABLET, FILM COATED ORAL at 15:20

## 2018-11-17 RX ADMIN — HYDRALAZINE HYDROCHLORIDE 25 MG: 25 TABLET, FILM COATED ORAL at 21:41

## 2018-11-17 RX ADMIN — ISOSORBIDE DINITRATE 20 MG: 10 TABLET ORAL at 21:41

## 2018-11-17 RX ADMIN — MOMETASONE FUROATE AND FORMOTEROL FUMARATE DIHYDRATE 2 PUFF: 100; 5 AEROSOL RESPIRATORY (INHALATION) at 22:36

## 2018-11-17 RX ADMIN — ISOSORBIDE DINITRATE 20 MG: 10 TABLET ORAL at 09:19

## 2018-11-17 RX ADMIN — FERROUS SULFATE TAB EC 325 MG (65 MG FE EQUIVALENT) 325 MG: 325 (65 FE) TABLET DELAYED RESPONSE at 09:19

## 2018-11-17 RX ADMIN — OXYCODONE HYDROCHLORIDE AND ACETAMINOPHEN 1 TABLET: 5; 325 TABLET ORAL at 21:43

## 2018-11-17 RX ADMIN — FUROSEMIDE 40 MG: 10 INJECTION, SOLUTION INTRAMUSCULAR; INTRAVENOUS at 09:20

## 2018-11-17 RX ADMIN — PANTOPRAZOLE SODIUM 40 MG: 40 TABLET, DELAYED RELEASE ORAL at 06:00

## 2018-11-17 RX ADMIN — OXYCODONE HYDROCHLORIDE 5 MG: 5 TABLET ORAL at 15:22

## 2018-11-17 RX ADMIN — METOLAZONE 5 MG: 5 TABLET ORAL at 15:21

## 2018-11-17 RX ADMIN — ATORVASTATIN CALCIUM 40 MG: 80 TABLET, FILM COATED ORAL at 21:41

## 2018-11-17 RX ADMIN — Medication 10 ML: at 22:35

## 2018-11-17 RX ADMIN — GABAPENTIN 800 MG: 800 TABLET, FILM COATED ORAL at 21:41

## 2018-11-17 RX ADMIN — FLUTICASONE PROPIONATE 1 SPRAY: 50 SPRAY, METERED NASAL at 09:20

## 2018-11-17 RX ADMIN — TIOTROPIUM BROMIDE 18 MCG: 18 CAPSULE ORAL; RESPIRATORY (INHALATION) at 08:57

## 2018-11-17 RX ADMIN — FERROUS SULFATE TAB EC 325 MG (65 MG FE EQUIVALENT) 325 MG: 325 (65 FE) TABLET DELAYED RESPONSE at 17:42

## 2018-11-17 RX ADMIN — GABAPENTIN 800 MG: 800 TABLET, FILM COATED ORAL at 09:21

## 2018-11-17 RX ADMIN — MOMETASONE FUROATE AND FORMOTEROL FUMARATE DIHYDRATE 2 PUFF: 100; 5 AEROSOL RESPIRATORY (INHALATION) at 08:57

## 2018-11-17 RX ADMIN — LORAZEPAM 1 MG: 1 TABLET ORAL at 22:34

## 2018-11-17 RX ADMIN — OXYCODONE HYDROCHLORIDE AND ACETAMINOPHEN 1 TABLET: 5; 325 TABLET ORAL at 15:22

## 2018-11-17 RX ADMIN — LISINOPRIL 5 MG: 5 TABLET ORAL at 09:19

## 2018-11-17 RX ADMIN — HEPARIN SODIUM 5000 UNITS: 5000 INJECTION, SOLUTION INTRAVENOUS; SUBCUTANEOUS at 21:43

## 2018-11-17 RX ADMIN — HYDRALAZINE HYDROCHLORIDE 25 MG: 25 TABLET, FILM COATED ORAL at 06:00

## 2018-11-17 RX ADMIN — OXYCODONE HYDROCHLORIDE 5 MG: 5 TABLET ORAL at 21:43

## 2018-11-17 RX ADMIN — OXYCODONE HYDROCHLORIDE AND ACETAMINOPHEN 1 TABLET: 5; 325 TABLET ORAL at 09:20

## 2018-11-17 RX ADMIN — SPIRONOLACTONE 25 MG: 25 TABLET ORAL at 09:19

## 2018-11-17 RX ADMIN — SERTRALINE 75 MG: 50 TABLET, FILM COATED ORAL at 09:19

## 2018-11-17 RX ADMIN — HEPARIN SODIUM 5000 UNITS: 5000 INJECTION, SOLUTION INTRAVENOUS; SUBCUTANEOUS at 15:22

## 2018-11-17 RX ADMIN — FUROSEMIDE 40 MG: 10 INJECTION, SOLUTION INTRAMUSCULAR; INTRAVENOUS at 15:22

## 2018-11-17 RX ADMIN — FUROSEMIDE 40 MG: 10 INJECTION, SOLUTION INTRAMUSCULAR; INTRAVENOUS at 21:41

## 2018-11-17 RX ADMIN — HEPARIN SODIUM 5000 UNITS: 5000 INJECTION, SOLUTION INTRAVENOUS; SUBCUTANEOUS at 05:36

## 2018-11-17 RX ADMIN — Medication 10 ML: at 09:20

## 2018-11-17 RX ADMIN — ISOSORBIDE DINITRATE 20 MG: 10 TABLET ORAL at 15:20

## 2018-11-17 RX ADMIN — OXYCODONE HYDROCHLORIDE 5 MG: 5 TABLET ORAL at 09:20

## 2018-11-17 RX ADMIN — HYDRALAZINE HYDROCHLORIDE 25 MG: 25 TABLET, FILM COATED ORAL at 15:20

## 2018-11-17 ASSESSMENT — ENCOUNTER SYMPTOMS
SHORTNESS OF BREATH: 1
VOMITING: 0
WHEEZING: 0
NAUSEA: 0
ABDOMINAL PAIN: 0
DIARRHEA: 0

## 2018-11-17 ASSESSMENT — PAIN DESCRIPTION - DESCRIPTORS: DESCRIPTORS: ACHING

## 2018-11-17 ASSESSMENT — PAIN DESCRIPTION - PAIN TYPE
TYPE: ACUTE PAIN
TYPE: ACUTE PAIN

## 2018-11-17 ASSESSMENT — PAIN DESCRIPTION - ORIENTATION: ORIENTATION: MID

## 2018-11-17 ASSESSMENT — PAIN SCALES - GENERAL
PAINLEVEL_OUTOF10: 6
PAINLEVEL_OUTOF10: 6
PAINLEVEL_OUTOF10: 0
PAINLEVEL_OUTOF10: 6
PAINLEVEL_OUTOF10: 7
PAINLEVEL_OUTOF10: 6

## 2018-11-17 ASSESSMENT — PAIN DESCRIPTION - LOCATION
LOCATION: ABDOMEN
LOCATION: ABDOMEN

## 2018-11-17 ASSESSMENT — PAIN DESCRIPTION - ONSET: ONSET: ON-GOING

## 2018-11-17 NOTE — CONSULTS
she is admitted to the hospital for the management of Acute on chronic diastolic CHF (congestive heart failure) ([facility]    Past Medical History:   has a past medical history of Acute on chronic diastolic CHF (congestive heart failure) ([facility] COPD (chronic obstructive pulmonary disease) ([facility] Hypertension; and Pulmonary hypertension ([facility] Past Surgical History:   has a past surgical history that includes hc cath power picc triple (2/2/2018); pr office/outpt visit,procedure only (N/A, 2/17/2018); Tracheotomy (02/2018); Gastrostomy tube placement (02/2018); and tracheostomy closure (03/2018). Home Medications:    Prior to Admission medications    Medication Sig Start Date End Date Taking? Authorizing Provider   LORazepam (ATIVAN) 1 MG tablet Take 1 mg by mouth every 6 hours as needed for Anxiety. .   Yes Historical Provider, MD   oxyCODONE-acetaminophen (PERCOCET)  MG per tablet Take 1 tablet by mouth every 6 hours as needed for Pain. .   Yes Historical Provider, MD   gabapentin (NEURONTIN) 800 MG tablet Take 800 mg by mouth 3 times daily. .   Yes Historical Provider, MD   isosorbide dinitrate (ISORDIL) 20 MG tablet Take 1 tablet by mouth 3 times daily 9/20/18   Mazin Oswald MD   mometasone-formoterol [facility]) 100-5 MCG/ACT inhaler Inhale 2 puffs into the lungs 2 times daily 9/20/18   Mazin Oswald MD   spironolactone (ALDACTONE) 25 MG tablet Take 1 tablet by mouth daily 9/20/18   Mazin Oswald MD   torsemide (DEMADEX) 20 MG tablet Take 1 tablet by mouth daily 9/20/18   Mazin Oswald MD   hydrALAZINE (APRESOLINE) 25 MG tablet Take 1 tablet by mouth every 8 hours 9/20/18   Mazin Oswald MD   Blood Pressure KIT 1 Device by Does not apply route 2 times daily 9/20/18   Berenice Sarkar MD   sertraline (ZOLOFT) 25 MG tablet Take 75 mg by mouth daily    Historical Provider, MD   atorvastatin (LIPITOR) 40 MG tablet Take 1 tablet by mouth nightly 2/21/18   ePte Armijo MD   cloNIDine (CATAPRES) 0.1 MG tablet Take 1 tablet by mouth 3 times daily as needed (SBP>160) 2/21/18   Arianna Gonzalez MD   famotidine (PEPCID) 20 MG tablet Take 1 tablet by mouth 2 times daily 2/21/18   Arianna Gonzalez MD   tiotropium (SPIRIVA) 18 MCG inhalation capsule Inhale 18 mcg into the lungs daily    Historical Provider, MD   albuterol sulfate  (90 Base) MCG/ACT inhaler Inhale 2 puffs into the lungs every 6 hours as needed for Wheezing    Historical Provider, MD   ferrous sulfate 325 (65 Fe) MG tablet Take 325 mg by mouth 2 times daily (with meals)     Historical Provider, MD   amLODIPine (NORVASC) 10 MG tablet Take 10 mg by mouth daily    Historical Provider, MD   loratadine (CLARITIN) 10 MG capsule Take 10 mg by mouth daily    Historical Provider, MD   fluticasone (FLONASE) 50 MCG/ACT nasal spray 1 spray by Nasal route daily    Historical Provider, MD     Scheduled Meds:   amLODIPine  10 mg Oral Daily    atorvastatin  40 mg Oral Nightly    ferrous sulfate  325 mg Oral BID WC    fluticasone  1 spray Nasal Daily    hydrALAZINE  25 mg Oral 3 times per day    gabapentin  800 mg Oral TID    isosorbide dinitrate  20 mg Oral TID WC    mometasone-formoterol  2 puff Inhalation BID    sertraline  75 mg Oral Daily    spironolactone  25 mg Oral Daily    tiotropium  18 mcg Inhalation Daily    sodium chloride flush  10 mL Intravenous 2 times per day    docusate sodium  100 mg Oral BID    lisinopril  5 mg Oral Daily    furosemide  40 mg Intravenous BID    pantoprazole  40 mg Oral QAM AC    heparin (porcine)  5,000 Units Subcutaneous 3 times per day     Continuous Infusions:    PRN Meds:     cloNIDine 0.1 mg TID PRN   sodium chloride flush 10 mL PRN   magnesium hydroxide 30 mL Daily PRN   bisacodyl 10 mg Daily PRN   ondansetron 4 mg Q6H PRN   nicotine 1 patch Daily PRN   acetaminophen 650 mg Q4H PRN   LORazepam 1 mg Daily PRN   oxyCODONE-acetaminophen 1 tablet Q6H PRN   And     oxyCODONE 5 mg

## 2018-11-17 NOTE — PROGRESS NOTES
5:06 PM   Result Value Ref Range    Troponin T <0.03 <0.03 ng/mL    Troponin Interp         Troponin    Collection Time: 11/16/18  8:31 PM   Result Value Ref Range    Troponin T <0.03 <0.03 ng/mL    Troponin Interp         Basic Metabolic Panel w/ Reflex to MG    Collection Time: 11/17/18  6:28 AM   Result Value Ref Range    Glucose 102 (H) 70 - 99 mg/dL    BUN 13 6 - 20 mg/dL    CREATININE 0.93 (H) 0.50 - 0.90 mg/dL    Bun/Cre Ratio NOT REPORTED 9 - 20    Calcium 8.3 (L) 8.6 - 10.4 mg/dL    Sodium 141 135 - 144 mmol/L    Potassium 3.8 3.7 - 5.3 mmol/L    Chloride 96 (L) 98 - 107 mmol/L    CO2 33 (H) 20 - 31 mmol/L    Anion Gap 12 9 - 17 mmol/L    GFR Non-African American >60 >60 mL/min    GFR African American >60 >60 mL/min    GFR Comment          GFR Staging NOT REPORTED    Brain Natriuretic Peptide    Collection Time: 11/17/18  6:28 AM   Result Value Ref Range    Pro- (H) <300 pg/mL    BNP Interpretation         CBC    Collection Time: 11/17/18  6:28 AM   Result Value Ref Range    WBC 5.3 3.5 - 11.3 k/uL    RBC 3.25 (L) 3.95 - 5.11 m/uL    Hemoglobin 8.8 (L) 11.9 - 15.1 g/dL    Hematocrit 29.9 (L) 36.3 - 47.1 %    MCV 92.0 82.6 - 102.9 fL    MCH 27.1 25.2 - 33.5 pg    MCHC 29.4 28.4 - 34.8 g/dL    RDW 13.3 11.8 - 14.4 %    Platelets 758 933 - 605 k/uL    MPV 10.7 8.1 - 13.5 fL    NRBC Automated 0.0 0.0 per 100 WBC   Lipid Panel    Collection Time: 11/17/18  6:28 AM   Result Value Ref Range    Cholesterol 144 <200 mg/dL    HDL 43 >40 mg/dL    LDL Cholesterol 87 0 - 130 mg/dL    Chol/HDL Ratio 3.3 <5    Triglycerides 68 <150 mg/dL    VLDL NOT REPORTED 1 - 30 mg/dL   Magnesium    Collection Time: 11/17/18  6:28 AM   Result Value Ref Range    Magnesium 1.6 1.6 - 2.6 mg/dL   TSH without Reflex    Collection Time: 11/17/18  6:28 AM   Result Value Ref Range    TSH 1.93 0.30 - 5.00 mIU/L     Lab Results   Component Value Date/Time    SPECIAL NOT REPORTED 02/23/2018 07:49 PM     Lab Results   Component Value Date/Time    CULTURE ENTEROBACTER AEROGENES MODERATE GROWTH (A) 02/23/2018 07:49 PM    CULTURE NO NORMAL TIFFANY (A) 02/23/2018 07:49 PM    CULTURE  02/23/2018 07:49 PM     [employer] [id number] [facility], [address] [place] [address] [phone]       Radiology:  Xr Chest Standard (2 Vw)  Result Date: 11/17/2018  Redemonstration of cardiomegaly and mild pulmonary vascular congestion. Xr Chest Portable  Result Date: 11/16/2018  Cardiomegaly and mild pulmonary vascular congestion. No focal airspace consolidation. Physical Examination:      Physical Exam   Constitutional: She is oriented to person, place, and time. She appears well-developed and well-nourished. Face mask in place. HENT:   Head: Normocephalic and atraumatic. Right Ear: External ear normal.   Left Ear: External ear normal.   Eyes: Conjunctivae and EOM are normal. Right eye exhibits no discharge. Left eye exhibits no discharge. No scleral icterus. Neck: Neck supple. No tracheal deviation present. Cardiovascular: Normal rate, regular rhythm and normal heart sounds. Pulmonary/Chest: Effort normal. She has decreased breath sounds. She has no wheezes. Abdominal: Soft. Bowel sounds are normal. She exhibits no distension and no mass. There is no tenderness. Musculoskeletal: She exhibits edema. She exhibits no tenderness. Lymphadenopathy:     She has no cervical adenopathy. Neurological: She is alert and oriented to person, place, and time. She exhibits normal muscle tone. Skin: Skin is warm and dry. No rash noted. Psychiatric: She has a normal mood and affect. Her behavior is normal.   Nursing note and vitals reviewed.      Assessment:        Principal Problem:    Acute on chronic diastolic CHF (congestive heart failure) ([facility])  Active Problems:    Smoker    Morbid obesity (HCC)    Obesity hypoventilation syndrome (HCC)    COPD (chronic obstructive pulmonary disease) (HCC)    Chronic respiratory failure (HCC)    Pulmonary hypertension ([facility]    Chronic narcotic dependence ([facility]    Chronically on benzodiazepine therapy    Neuropathy    Epigastric pain  Resolved Problems:    * No resolved hospital problems. *      Plan:        1. Acute on chronic diastolic CHF (congestive heart failure) : Cardiac diet, IV Lasix 40 mg twice a day,  spironolactone. Continue home medication, cardiology evaluation, CHF clinic consultation. Monitor creatinine  2. Smoker : Nicotine patches  3. Obesity hypoventilation syndrome / COPD (chronic obstructive pulmonary disease) /  Chronic respiratory failure / Pulmonary hypertension : Complicating factors, : stable. Respiratory care, inhalers, oxygen ,continous pulse ox, spirometer , no acute exacerbation, bipap as needed  4. Chronic narcotic dependence /  Chronically on benzodiazepine therapy : Resume home doses , counseling  5. Neuropathy: On gabapentin 800 mg 3 times a day  6. Epigastric pain: Protonix, monitor response  7. HTN: stable,resume home meds  8. Check Electrolytes and Electrolytes replacementas needed   9. Morbid obesity complicating factor. dietitian evaluation. 10. DVT prophylaxis: heparin (porcine) injection 5,000 TID  11. PT, OT as needed. IV Fluids: ,    Electrolytes: Supp as needed  Nutrition:  DIET CARDIAC; Daily Fluid Restriction: 1500 ml      Consults: IP CONSULT TO HOSPITALIST  IP CONSULT TO HEART FAILURE NURSE/COORDINATOR  IP CONSULT TO DIETITIAN  IP CONSULT TO CARDIOLOGY     Discussed care plan with nurse after getting input from the nurse.     Above plan discussed with the patient in room, who agreed to the above plan     Please call if any questions    Bradford Cedeno MD  [facility] Hospitalist  11/17/2018  2:26 PM     (Please note that this chart was generated using voice recognition Dragon dictation software program. Although every effort was made to ensure the accuracy of thisautomated transcription, some errors in transcription may have occurred.)

## 2018-11-17 NOTE — PROGRESS NOTES
Nutrition Education    Type and Reason for Visit: Consult (diet education- CHF)    Discussed CHF diet recommendations w/ pt. Discussed low-sodium flavor options (citus, herbs/spices, vinegar) for food. Pt reports she drinks three 600 ml cups (1800 ml total) of fluids per day. Discussed with pt other foods that would have fluids (soups, jello, watermelon, etc.). Pt stated understanding. All questions answered at this time. · Verbally reviewed following information with Pt. · Written educational materials provided. · Contact name and number provided. · Refer to Patient Education activity for more details.     Electronically signed by Naman Berman DTR on 11/17/18 at 2:47 PM    Contact Number: [phone]

## 2018-11-17 NOTE — PROGRESS NOTES
Cardiac Testing     ECHO 9/19/18: EF 65%, DD, RA enlargement, no valvular abnormalities. STRESS 6/10/18: No ischemia or infarct.  EF 57%.     - HTN  - Obesity  - COPD  - Chronic Diastolic HF  - Nuclear in 6/18- negative for ischemia/infarct, normal LVEF  - Echo in 9/18- EF 65%, LVH, DD, Biatrial Dilation, RVSP of 43, no significant valve disease

## 2018-11-18 LAB
ANION GAP SERPL CALCULATED.3IONS-SCNC: 13 MMOL/L (ref 9–17)
BUN BLDV-MCNC: 13 MG/DL (ref 6–20)
BUN/CREAT BLD: ABNORMAL (ref 9–20)
CALCIUM SERPL-MCNC: 8.9 MG/DL (ref 8.6–10.4)
CHLORIDE BLD-SCNC: 89 MMOL/L (ref 98–107)
CO2: 35 MMOL/L (ref 20–31)
CREAT SERPL-MCNC: 0.93 MG/DL (ref 0.5–0.9)
GFR AFRICAN AMERICAN: >60 ML/MIN
GFR NON-AFRICAN AMERICAN: >60 ML/MIN
GFR SERPL CREATININE-BSD FRML MDRD: ABNORMAL ML/MIN/{1.73_M2}
GFR SERPL CREATININE-BSD FRML MDRD: ABNORMAL ML/MIN/{1.73_M2}
GLUCOSE BLD-MCNC: 109 MG/DL (ref 70–99)
GLUCOSE BLD-MCNC: 116 MG/DL (ref 65–105)
MAGNESIUM: 1.6 MG/DL (ref 1.6–2.6)
POTASSIUM SERPL-SCNC: 3.3 MMOL/L (ref 3.7–5.3)
SODIUM BLD-SCNC: 137 MMOL/L (ref 135–144)

## 2018-11-18 PROCEDURE — 80048 BASIC METABOLIC PNL TOTAL CA: CPT

## 2018-11-18 PROCEDURE — 6360000002 HC RX W HCPCS: Performed by: NURSE PRACTITIONER

## 2018-11-18 PROCEDURE — 97162 PT EVAL MOD COMPLEX 30 MIN: CPT

## 2018-11-18 PROCEDURE — G8978 MOBILITY CURRENT STATUS: HCPCS

## 2018-11-18 PROCEDURE — 2700000000 HC OXYGEN THERAPY PER DAY

## 2018-11-18 PROCEDURE — 83735 ASSAY OF MAGNESIUM: CPT

## 2018-11-18 PROCEDURE — 82947 ASSAY GLUCOSE BLOOD QUANT: CPT

## 2018-11-18 PROCEDURE — 6360000002 HC RX W HCPCS: Performed by: FAMILY MEDICINE

## 2018-11-18 PROCEDURE — 6370000000 HC RX 637 (ALT 250 FOR IP): Performed by: NURSE PRACTITIONER

## 2018-11-18 PROCEDURE — 2580000003 HC RX 258: Performed by: FAMILY MEDICINE

## 2018-11-18 PROCEDURE — 97530 THERAPEUTIC ACTIVITIES: CPT

## 2018-11-18 PROCEDURE — G8979 MOBILITY GOAL STATUS: HCPCS

## 2018-11-18 PROCEDURE — 99232 SBSQ HOSP IP/OBS MODERATE 35: CPT | Performed by: FAMILY MEDICINE

## 2018-11-18 PROCEDURE — 2060000000 HC ICU INTERMEDIATE R&B

## 2018-11-18 PROCEDURE — 94762 N-INVAS EAR/PLS OXIMTRY CONT: CPT

## 2018-11-18 PROCEDURE — 6370000000 HC RX 637 (ALT 250 FOR IP): Performed by: FAMILY MEDICINE

## 2018-11-18 PROCEDURE — 94640 AIRWAY INHALATION TREATMENT: CPT

## 2018-11-18 PROCEDURE — 6360000002 HC RX W HCPCS: Performed by: INTERNAL MEDICINE

## 2018-11-18 PROCEDURE — 36415 COLL VENOUS BLD VENIPUNCTURE: CPT

## 2018-11-18 PROCEDURE — 94660 CPAP INITIATION&MGMT: CPT

## 2018-11-18 PROCEDURE — 6370000000 HC RX 637 (ALT 250 FOR IP): Performed by: INTERNAL MEDICINE

## 2018-11-18 RX ORDER — MAGNESIUM SULFATE 1 G/100ML
1 INJECTION INTRAVENOUS PRN
Status: DISCONTINUED | OUTPATIENT
Start: 2018-11-18 | End: 2018-11-21 | Stop reason: HOSPADM

## 2018-11-18 RX ORDER — POTASSIUM CHLORIDE 7.45 MG/ML
10 INJECTION INTRAVENOUS PRN
Status: DISCONTINUED | OUTPATIENT
Start: 2018-11-18 | End: 2018-11-21 | Stop reason: HOSPADM

## 2018-11-18 RX ORDER — POTASSIUM CHLORIDE 20 MEQ/1
40 TABLET, EXTENDED RELEASE ORAL PRN
Status: DISCONTINUED | OUTPATIENT
Start: 2018-11-18 | End: 2018-11-21 | Stop reason: HOSPADM

## 2018-11-18 RX ORDER — POTASSIUM CHLORIDE 20MEQ/15ML
40 LIQUID (ML) ORAL PRN
Status: DISCONTINUED | OUTPATIENT
Start: 2018-11-18 | End: 2018-11-21 | Stop reason: HOSPADM

## 2018-11-18 RX ORDER — GABAPENTIN 400 MG/1
800 CAPSULE ORAL 3 TIMES DAILY
Status: DISCONTINUED | OUTPATIENT
Start: 2018-11-18 | End: 2018-11-21 | Stop reason: HOSPADM

## 2018-11-18 RX ADMIN — FLUTICASONE PROPIONATE 1 SPRAY: 50 SPRAY, METERED NASAL at 09:54

## 2018-11-18 RX ADMIN — GABAPENTIN 800 MG: 800 TABLET, FILM COATED ORAL at 11:07

## 2018-11-18 RX ADMIN — HEPARIN SODIUM 5000 UNITS: 5000 INJECTION, SOLUTION INTRAVENOUS; SUBCUTANEOUS at 21:26

## 2018-11-18 RX ADMIN — METOLAZONE 5 MG: 5 TABLET ORAL at 09:50

## 2018-11-18 RX ADMIN — PANTOPRAZOLE SODIUM 40 MG: 40 TABLET, DELAYED RELEASE ORAL at 09:51

## 2018-11-18 RX ADMIN — OXYCODONE HYDROCHLORIDE 5 MG: 5 TABLET ORAL at 22:51

## 2018-11-18 RX ADMIN — FERROUS SULFATE TAB EC 325 MG (65 MG FE EQUIVALENT) 325 MG: 325 (65 FE) TABLET DELAYED RESPONSE at 16:37

## 2018-11-18 RX ADMIN — OXYCODONE HYDROCHLORIDE AND ACETAMINOPHEN 1 TABLET: 5; 325 TABLET ORAL at 22:51

## 2018-11-18 RX ADMIN — FUROSEMIDE 40 MG: 10 INJECTION, SOLUTION INTRAMUSCULAR; INTRAVENOUS at 21:46

## 2018-11-18 RX ADMIN — ONDANSETRON HYDROCHLORIDE 4 MG: 2 INJECTION, SOLUTION INTRAMUSCULAR; INTRAVENOUS at 22:51

## 2018-11-18 RX ADMIN — MOMETASONE FUROATE AND FORMOTEROL FUMARATE DIHYDRATE 2 PUFF: 100; 5 AEROSOL RESPIRATORY (INHALATION) at 20:56

## 2018-11-18 RX ADMIN — MOMETASONE FUROATE AND FORMOTEROL FUMARATE DIHYDRATE 2 PUFF: 100; 5 AEROSOL RESPIRATORY (INHALATION) at 08:44

## 2018-11-18 RX ADMIN — HYDRALAZINE HYDROCHLORIDE 25 MG: 25 TABLET, FILM COATED ORAL at 09:53

## 2018-11-18 RX ADMIN — ONDANSETRON HYDROCHLORIDE 4 MG: 2 INJECTION, SOLUTION INTRAMUSCULAR; INTRAVENOUS at 09:51

## 2018-11-18 RX ADMIN — OXYCODONE HYDROCHLORIDE 5 MG: 5 TABLET ORAL at 16:36

## 2018-11-18 RX ADMIN — SERTRALINE 75 MG: 50 TABLET, FILM COATED ORAL at 09:50

## 2018-11-18 RX ADMIN — ISOSORBIDE DINITRATE 20 MG: 10 TABLET ORAL at 16:37

## 2018-11-18 RX ADMIN — ONDANSETRON HYDROCHLORIDE 4 MG: 2 INJECTION, SOLUTION INTRAMUSCULAR; INTRAVENOUS at 16:36

## 2018-11-18 RX ADMIN — FUROSEMIDE 40 MG: 10 INJECTION, SOLUTION INTRAMUSCULAR; INTRAVENOUS at 14:05

## 2018-11-18 RX ADMIN — SPIRONOLACTONE 25 MG: 25 TABLET ORAL at 09:50

## 2018-11-18 RX ADMIN — POTASSIUM CHLORIDE 40 MEQ: 20 TABLET, EXTENDED RELEASE ORAL at 21:16

## 2018-11-18 RX ADMIN — OXYCODONE HYDROCHLORIDE AND ACETAMINOPHEN 1 TABLET: 5; 325 TABLET ORAL at 16:36

## 2018-11-18 RX ADMIN — HYDRALAZINE HYDROCHLORIDE 25 MG: 25 TABLET, FILM COATED ORAL at 21:16

## 2018-11-18 RX ADMIN — MAGNESIUM SULFATE HEPTAHYDRATE 1 G: 1 INJECTION, SOLUTION INTRAVENOUS at 21:17

## 2018-11-18 RX ADMIN — FERROUS SULFATE TAB EC 325 MG (65 MG FE EQUIVALENT) 325 MG: 325 (65 FE) TABLET DELAYED RESPONSE at 09:50

## 2018-11-18 RX ADMIN — OXYCODONE HYDROCHLORIDE 5 MG: 5 TABLET ORAL at 09:51

## 2018-11-18 RX ADMIN — GABAPENTIN 800 MG: 800 TABLET, FILM COATED ORAL at 16:37

## 2018-11-18 RX ADMIN — LORAZEPAM 1 MG: 1 TABLET ORAL at 22:51

## 2018-11-18 RX ADMIN — GABAPENTIN 800 MG: 400 CAPSULE ORAL at 21:46

## 2018-11-18 RX ADMIN — TIOTROPIUM BROMIDE 18 MCG: 18 CAPSULE ORAL; RESPIRATORY (INHALATION) at 08:44

## 2018-11-18 RX ADMIN — ATORVASTATIN CALCIUM 40 MG: 80 TABLET, FILM COATED ORAL at 21:16

## 2018-11-18 RX ADMIN — ISOSORBIDE DINITRATE 20 MG: 10 TABLET ORAL at 09:50

## 2018-11-18 RX ADMIN — HEPARIN SODIUM 5000 UNITS: 5000 INJECTION, SOLUTION INTRAVENOUS; SUBCUTANEOUS at 14:05

## 2018-11-18 RX ADMIN — ISOSORBIDE DINITRATE 20 MG: 10 TABLET ORAL at 14:06

## 2018-11-18 RX ADMIN — OXYCODONE HYDROCHLORIDE AND ACETAMINOPHEN 1 TABLET: 5; 325 TABLET ORAL at 09:52

## 2018-11-18 RX ADMIN — Medication 10 ML: at 09:51

## 2018-11-18 RX ADMIN — HYDRALAZINE HYDROCHLORIDE 25 MG: 25 TABLET, FILM COATED ORAL at 14:06

## 2018-11-18 RX ADMIN — FUROSEMIDE 40 MG: 10 INJECTION, SOLUTION INTRAMUSCULAR; INTRAVENOUS at 09:51

## 2018-11-18 ASSESSMENT — PAIN SCALES - GENERAL
PAINLEVEL_OUTOF10: 6
PAINLEVEL_OUTOF10: 8
PAINLEVEL_OUTOF10: 6
PAINLEVEL_OUTOF10: 8
PAINLEVEL_OUTOF10: 7
PAINLEVEL_OUTOF10: 6

## 2018-11-18 ASSESSMENT — PAIN DESCRIPTION - PAIN TYPE
TYPE: ACUTE PAIN

## 2018-11-18 ASSESSMENT — ENCOUNTER SYMPTOMS
VOMITING: 0
WHEEZING: 0
NAUSEA: 0
DIARRHEA: 0
ABDOMINAL PAIN: 0
SHORTNESS OF BREATH: 1

## 2018-11-18 ASSESSMENT — PAIN DESCRIPTION - ONSET: ONSET: GRADUAL

## 2018-11-18 ASSESSMENT — PAIN DESCRIPTION - ORIENTATION
ORIENTATION: RIGHT

## 2018-11-18 ASSESSMENT — PAIN DESCRIPTION - FREQUENCY
FREQUENCY: INTERMITTENT
FREQUENCY: INTERMITTENT

## 2018-11-18 ASSESSMENT — PAIN DESCRIPTION - LOCATION
LOCATION: HAND
LOCATION: CHEST
LOCATION: ABDOMEN;CHEST;ARM

## 2018-11-18 ASSESSMENT — PAIN DESCRIPTION - DESCRIPTORS
DESCRIPTORS: THROBBING
DESCRIPTORS: ACHING

## 2018-11-18 NOTE — PROGRESS NOTES
[facility]          Progress Note    11/18/2018    1:43 PM    Name:   Soledad Frazier  MRN:     [id number]     [place]:      [de-identified]   Room:   [address] [place] [address] Day:  2  Admit Date:  11/16/2018 10:28 AM    PCP:   Josefina Calhoun DO  Code Status:  Full Code    Subjective:     C/C:   Chief Complaint   Patient presents with    Chest Pain     c/o middle and left chest pain worsening over the past few days    Shortness of Breath     states 35 lbs weight gain over the past month, states was on 2 diuretics but her \"kidney labs\" were elevated so her meds changed, pt is on home O2 at 2 lpm, speaks in full sentences    Nausea    Abdominal Pain     worsening over past few days, states located in middle and left area    Cough     states clear and yellow sputum       Interval History Status: improving but not at target. The patient is a 37 y.o.  female who is admitted to the hospital for the management of CHF exacerbation     Patient seen and examined at the bed side , no new acute events overnight except since of breath is slightly improving. Uses  BiPAP overnight. Creatinine started to increase slightly, stable,the same as yesterday     Pt denies any Chest pain , new pain, vomiting. Notes from nursing staff and Consults had been reviewed, and the overnight progress had been checked with the nursing staff as well. Brief History:     Soledad Frazier is a 37 y.o. female who presents with Acute exacerbation of chest pain and shortness of breath that has been there for the last few days became progressively worse in the last 2 days. Patient's report that she has been treated for CHF in the past, but she has been gaining 35 pounds over the last 3 weeks.   She was treated with diuretics and that led to her kidney function to deteriorate, so they back off on one of the diuretics and subsequently she is gaining weight currently per patient's statement. Patient also reported that the increase chest pain and abdominal discomfort with nausea but no vomiting with on and off diarrhea    At ED BMP was slightly elevated. Chest x-ray was remarkable for pulmonary vascular congestion      Patient has recent admission for CHF exacerbation. Echo was done remarkable for diastolic dysfunction and pulmonary hypertension. Patient is morbidly obese with BMI of 75.9    and she is admitted to the hospital for the management of Acute on chronic diastolic CHF (congestive heart failure)      Review of Systems:   Review of Systems   Constitutional: Positive for fatigue. Negative for activity change, appetite change and fever. Respiratory: Positive for shortness of breath. Negative for wheezing. Cardiovascular: Positive for leg swelling. Negative for chest pain. Gastrointestinal: Negative for abdominal pain, diarrhea, nausea and vomiting. Neurological: Negative for syncope and headaches. Medications: Allergies:     Allergies   Allergen Reactions    Asa [Aspirin]     Beta Adrenergic Blockers Other (See Comments)     Asystole and Bradycardia on admission  01/26/2018-2/22/2018 at [facility]    Sulfa Antibiotics        Current Meds:   Scheduled Meds:    metolazone  5 mg Oral Daily    furosemide  40 mg Intravenous TID    amLODIPine  10 mg Oral Daily    atorvastatin  40 mg Oral Nightly    ferrous sulfate  325 mg Oral BID WC    fluticasone  1 spray Nasal Daily    hydrALAZINE  25 mg Oral 3 times per day    gabapentin  800 mg Oral TID    isosorbide dinitrate  20 mg Oral TID WC    mometasone-formoterol  2 puff Inhalation BID    sertraline  75 mg Oral Daily    spironolactone  25 mg Oral Daily    tiotropium  18 mcg Inhalation Daily    sodium chloride flush  10 mL Intravenous 2 times per day    docusate sodium  100 mg Oral BID    lisinopril  5 mg Oral Daily    pantoprazole  40 mg Oral QAM AC    heparin (porcine)  5,000 Units Subcutaneous 3 times per

## 2018-11-18 NOTE — PROGRESS NOTES
Physical Therapy    Facility/Department: [facility]  Initial Assessment    NAME: Keke Gillette  : 1975  MRN: [id number]    Date of Service: 2018  Chief Complaint   Patient presents with    Chest Pain     c/o middle and left chest pain worsening over the past few days    Shortness of Breath     states 35 lbs weight gain over the past month, states was on 2 diuretics but her \"kidney labs\" were elevated so her meds changed, pt is on home O2 at 2 lpm, speaks in full sentences    Nausea    Abdominal Pain     worsening over past few days, states located in middle and left area    Cough     states clear and yellow sputum       Discharge Recommendations:  Continue to assess pending progress, Subacute/Skilled Nursing Facility   PT Equipment Recommendations  Equipment Needed:  (TBD)    Patient Diagnosis(es): The encounter diagnosis was Acute on chronic congestive heart failure, unspecified heart failure type ([facility] has a past medical history of Acute on chronic diastolic CHF (congestive heart failure) ([facility] COPD (chronic obstructive pulmonary disease) ([facility] [address] [facility] Hypertension; and Pulmonary hypertension ([facility] [facility] has a past surgical history that includes hc cath power picc triple (2018); pr office/outpt visit,procedure only (N/A, 2018); Tracheotomy (2018); Gastrostomy tube placement (2018); and tracheostomy closure (2018).     Restrictions  Restrictions/Precautions  Restrictions/Precautions: Fall Risk, General Precautions  Required Braces or Orthoses?: No  Position Activity Restriction  Other position/activity restrictions: up with assist  Vision/Hearing  Vision: Impaired  Vision Exceptions: Wears glasses at all times  Hearing: Within functional limits     Subjective  General  Patient assessed for rehabilitation services?: Yes  Response To Previous Treatment: Not applicable  Family / Caregiver Present: Yes (father presents part of way into session)  Follows Commands: Within Functional endurance  Patient Goals   Patient goals : Pt would like to see her breathing continue to improve and be able to walk better       Therapy Time   Individual Concurrent Group Co-treatment   Time In 1322         Time Out 1344         Minutes 22         Timed Code Treatment Minutes: [facility],

## 2018-11-18 NOTE — PROGRESS NOTES
[facility]        Date:   11/18/2018  Patient name: Kait Kern  Date of admission:  11/16/2018 10:28 AM  MRN:   [id number]  YOB: 1975  PCP: Cris Jeffries DO    Reason for Consult:       Subjective: No new cardiac issues. No overnight events. Chart reviewed. Physical Exam:   Vitals: BP (!) 114/58   Pulse 77   Temp 99 °F (37.2 °C) (Axillary)   Resp 13   Ht 5' (1.524 m)   Wt (!) 389 lb 1.6 oz (176.5 kg)   LMP 10/01/2018   SpO2 99%   BMI 75.99 kg/m²   General appearance: alert and cooperative with exam  HEENT: Head: Normocephalic, no lesions, without obvious abnormality. Neck: no adenopathy, no carotid bruit, no JVD, supple, symmetrical, trachea midline and thyroid not enlarged, symmetric, no tenderness/mass/nodules  Lungs: clear to auscultation bilaterally  Heart: regular rate and rhythm, S1, S2 normal, no murmur, click, rub or gallop  Abdomen: soft, non-tender; bowel sounds normal; no masses,  no organomegaly  Extremities: extremities normal, atraumatic, no cyanosis or edema  Neurologic: Mental status: Alert, oriented, thought content appropriate      Lab work, imaging, nursing, and chart reviewed extensively.     Medications:   Scheduled Meds:   metolazone  5 mg Oral Daily    furosemide  40 mg Intravenous TID    amLODIPine  10 mg Oral Daily    atorvastatin  40 mg Oral Nightly    ferrous sulfate  325 mg Oral BID WC    fluticasone  1 spray Nasal Daily    hydrALAZINE  25 mg Oral 3 times per day    gabapentin  800 mg Oral TID    isosorbide dinitrate  20 mg Oral TID WC    mometasone-formoterol  2 puff Inhalation BID    sertraline  75 mg Oral Daily    spironolactone  25 mg Oral Daily    tiotropium  18 mcg Inhalation Daily    sodium chloride flush  10 mL Intravenous 2 times per day    docusate sodium  100 mg Oral BID    lisinopril  5 mg Oral Daily    pantoprazole  40 mg Oral QAM AC    heparin (porcine)  5,000 Units Subcutaneous 3 times per day Continuous Infusions:      Labs:     CBC:   Recent Labs      11/16/18   1053  11/17/18   0628   WBC  7.3  5.3   HGB  8.7*  8.8*   PLT  251  225     BMP:    Recent Labs      11/16/18   1053  11/17/18   0628  11/18/18   0655   NA  141  141  137   K  5.1  3.8  3.3*   CL  101  96*  89*   CO2  28  33*  35*   BUN  13  13  13   CREATININE  0.71  0.93*  0.93*   GLUCOSE  111*  102*  109*     Hepatic:   Recent Labs      11/16/18   1053   AST  22   ALT  29   BILITOT  0.29*   ALKPHOS  148*     Troponin:   Recent Labs      11/16/18   1050  11/16/18   1243   TROPONINI  0.00  0.00     Lipids:   Recent Labs      11/17/18   0628   CHOL  144   HDL  43     Other active acute problems:  Patient Active Problem List   Diagnosis    Pneumonia of both lower lobes due to infectious organism [facility]    NSTEMI (non-ST elevated myocardial infarction) ([facility]    Acute pulmonary edema ([address]    Hypertensive emergency    Acute respiratory failure with hypoxia and hypercapnia ([address]    Smoker    Morbid obesity ([address]    Obesity hypoventilation syndrome [facility]    SOB (shortness of breath)    COPD (chronic obstructive pulmonary disease) ([facility]    ARDS (adult respiratory distress syndrome) ([facility]    Fever    Disease due to rhinovirus    Encounter for PEG (percutaneous endoscopic gastrostomy) ([address]    Status post tracheostomy ([facility]    Status post insertion of percutaneous endoscopic gastrostomy (PEG) tube ([facility]    Rhinovirus infection    Acute non-recurrent pansinusitis    Acute on chronic diastolic CHF (congestive heart failure) (HCC)    Chronic respiratory failure (HCC)    Acute on chronic congestive heart failure (HCC)    Pulmonary hypertension (HCC)    Chronic narcotic dependence ([address]    Chronically on benzodiazepine therapy    Neuropathy    Epigastric pain         IMPRESSION & Recommendations:      Congestive HFpEF with HIEN  With preserved EF:   -Elevated BP and ProBNP lelvels.  : metolazone and furosemide for fluid retention

## 2018-11-18 NOTE — CONSULTS
Renal Consult Note    Patient :  Radha Flores; 37 y.o. MRN# [id number]  Location:  3003/3003-01  Attending:  Nel Black MD  Admit Date:  11/16/2018   Hospital Day: 2    Reason for Consult:     Asked by Dr Nel Black MD to see for  Diuretic management with normal renal function    History Obtained From:     patient, electronic medical record    History of Present Illness:     Radha Flores; 37 y.o. female with past medical history morbid obesity (BMI 75), chronic diastolic heart failure, pulmonary HTN, and COPD. She was admitted several days ago for acute exacerbation of CHF. She reported a 35 pound weight increase over 3 weeks. Apparently diuretics were adjusted  And Zaroxolyn added as an outpatient, but renal function declined according to the patient. She states her creatinine was as high as 1.4, so zaroxolyn was stopped by cardiology and she was instructed to increase Torsemide to 30mg daily. This lab result is not in the system to confirm. She states the fluid continued to build up in her abdomen and lower extremities with the diuretic decrease. Dyspnea worsened and she developed chest tightness. Her CXR with mild pulmonary congestion, pro-BNP was 548. She was started on Lasix 40mg IV TID in addition to Zaroxolyn 5 mg daily, and aldactone 25mg daily. She feels much better. Her home diuretic dose was Torsemide 30mg daily, aldactone 25mg daily. Her creatinine on admission was 0.71 >0.93>0.93 today. UOP has been 1650 last 8 hours. Patient states she still feels some short of breath and has difficulty laying flat but is much better than what she came in with. She also complains of exertional dyspnea. Past History/Allergies? Social History:     Past Medical History:   Diagnosis Date    Acute on chronic diastolic CHF (congestive heart failure) ([facility] 9/15/2018    COPD (chronic obstructive pulmonary disease) (HCC)     Hypertension     Pulmonary hypertension (HCC)        Allergies Urinalysis/Chemistries:      Lab Results   Component Value Date    NITRU NEGATIVE 01/26/2018    COLORU YELLOW 01/26/2018    PHUR 6.5 01/26/2018    WBCUA 2 TO 5 01/26/2018    RBCUA 0 TO 2 01/26/2018    MUCUS 1+ 01/26/2018    TRICHOMONAS NOT REPORTED 01/26/2018    YEAST NOT REPORTED 01/26/2018    BACTERIA NOT REPORTED 01/26/2018    SPECGRAV 1.013 01/26/2018    LEUKOCYTESUR NEGATIVE 01/26/2018    UROBILINOGEN Normal 01/26/2018    BILIRUBINUR NEGATIVE 01/26/2018    GLUCOSEU NEGATIVE 01/26/2018    KETUA TRACE 01/26/2018    AMORPHOUS NOT REPORTED 01/26/2018     Urine Creatinine:     Lab Results   Component Value Date    LABCREA 75.3 09/17/2018       Radiology:     CXR:   FINDINGS:   The heart is enlarged.  There is mildly prominent pulmonary vascularity. There is no significant effusion. Didier Hasten is no pneumothorax.  The upper   abdomen is unremarkable.  The extrathoracic soft tissues are unremarkable.           Impression   Redemonstration of cardiomegaly and mild pulmonary vascular congestion       ECHO 9/2018  Summary  Left ventricle is normal in size with hyperdynamic systolic function. Increased left ventricular wall thickness. Estimated ejection fraction is 65% . Evidence of diastolic dysfunction. Enlarged left atrium. Increased left atrial volume. Right atrial enlargement. No significant valvular abnormalities . RVSP 43 mmhg. Assessment:     1. Acute on chronic diastolic heart failure secondary to morbid obesity and pulmonary HTN  2. HTN  3. Hypokalemia   4. Volume overload. 5. Metabolic alkalosis   6. Pulmonary HTN  7. Morbid Obesity  8. COPD  9. Anemia     Plan:   1. Continue IV Lasix 40mg TID in addition to Zaroxolyn for now. Will cont to assess per volume status. 2. Monitor BMP  3. Monitor and replace electrolytes as needed. Potassium replaced. 4. Will follow. Nutrition   Please ensure that patient is on a renal diet/TF. Avoid nephrotoxic drugs/contrast exposure.     Thank you for the

## 2018-11-19 PROBLEM — M62.838 MUSCLE SPASM: Status: ACTIVE | Noted: 2018-11-19

## 2018-11-19 LAB
ANION GAP SERPL CALCULATED.3IONS-SCNC: 15 MMOL/L (ref 9–17)
BNP INTERPRETATION: NORMAL
BUN BLDV-MCNC: 17 MG/DL (ref 6–20)
BUN/CREAT BLD: ABNORMAL (ref 9–20)
CALCIUM SERPL-MCNC: 8.9 MG/DL (ref 8.6–10.4)
CHLORIDE BLD-SCNC: 84 MMOL/L (ref 98–107)
CO2: 39 MMOL/L (ref 20–31)
CREAT SERPL-MCNC: 1.21 MG/DL (ref 0.5–0.9)
GFR AFRICAN AMERICAN: 59 ML/MIN
GFR NON-AFRICAN AMERICAN: 49 ML/MIN
GFR SERPL CREATININE-BSD FRML MDRD: ABNORMAL ML/MIN/{1.73_M2}
GFR SERPL CREATININE-BSD FRML MDRD: ABNORMAL ML/MIN/{1.73_M2}
GLUCOSE BLD-MCNC: 117 MG/DL (ref 70–99)
POTASSIUM SERPL-SCNC: 4.2 MMOL/L (ref 3.7–5.3)
PRO-BNP: 28 PG/ML
SODIUM BLD-SCNC: 138 MMOL/L (ref 135–144)

## 2018-11-19 PROCEDURE — G8988 SELF CARE GOAL STATUS: HCPCS

## 2018-11-19 PROCEDURE — 94660 CPAP INITIATION&MGMT: CPT

## 2018-11-19 PROCEDURE — 6360000002 HC RX W HCPCS: Performed by: INTERNAL MEDICINE

## 2018-11-19 PROCEDURE — 99232 SBSQ HOSP IP/OBS MODERATE 35: CPT | Performed by: FAMILY MEDICINE

## 2018-11-19 PROCEDURE — 80048 BASIC METABOLIC PNL TOTAL CA: CPT

## 2018-11-19 PROCEDURE — 6370000000 HC RX 637 (ALT 250 FOR IP): Performed by: FAMILY MEDICINE

## 2018-11-19 PROCEDURE — 2060000000 HC ICU INTERMEDIATE R&B

## 2018-11-19 PROCEDURE — 2580000003 HC RX 258: Performed by: FAMILY MEDICINE

## 2018-11-19 PROCEDURE — 76937 US GUIDE VASCULAR ACCESS: CPT

## 2018-11-19 PROCEDURE — 94640 AIRWAY INHALATION TREATMENT: CPT

## 2018-11-19 PROCEDURE — 36415 COLL VENOUS BLD VENIPUNCTURE: CPT

## 2018-11-19 PROCEDURE — G8987 SELF CARE CURRENT STATUS: HCPCS

## 2018-11-19 PROCEDURE — 94762 N-INVAS EAR/PLS OXIMTRY CONT: CPT

## 2018-11-19 PROCEDURE — 83880 ASSAY OF NATRIURETIC PEPTIDE: CPT

## 2018-11-19 PROCEDURE — 6360000002 HC RX W HCPCS: Performed by: FAMILY MEDICINE

## 2018-11-19 PROCEDURE — 2700000000 HC OXYGEN THERAPY PER DAY

## 2018-11-19 PROCEDURE — 97535 SELF CARE MNGMENT TRAINING: CPT

## 2018-11-19 PROCEDURE — 97166 OT EVAL MOD COMPLEX 45 MIN: CPT

## 2018-11-19 RX ORDER — TIZANIDINE 4 MG/1
8 TABLET ORAL EVERY 8 HOURS PRN
Status: DISCONTINUED | OUTPATIENT
Start: 2018-11-19 | End: 2018-11-21 | Stop reason: HOSPADM

## 2018-11-19 RX ADMIN — FLUTICASONE PROPIONATE 1 SPRAY: 50 SPRAY, METERED NASAL at 08:04

## 2018-11-19 RX ADMIN — OXYCODONE HYDROCHLORIDE 5 MG: 5 TABLET ORAL at 08:16

## 2018-11-19 RX ADMIN — LISINOPRIL 5 MG: 5 TABLET ORAL at 08:04

## 2018-11-19 RX ADMIN — HEPARIN SODIUM 5000 UNITS: 5000 INJECTION, SOLUTION INTRAVENOUS; SUBCUTANEOUS at 13:57

## 2018-11-19 RX ADMIN — TIOTROPIUM BROMIDE 18 MCG: 18 CAPSULE ORAL; RESPIRATORY (INHALATION) at 09:31

## 2018-11-19 RX ADMIN — MOMETASONE FUROATE AND FORMOTEROL FUMARATE DIHYDRATE 2 PUFF: 100; 5 AEROSOL RESPIRATORY (INHALATION) at 19:39

## 2018-11-19 RX ADMIN — GABAPENTIN 800 MG: 400 CAPSULE ORAL at 08:07

## 2018-11-19 RX ADMIN — HYDRALAZINE HYDROCHLORIDE 25 MG: 25 TABLET, FILM COATED ORAL at 07:53

## 2018-11-19 RX ADMIN — HEPARIN SODIUM 5000 UNITS: 5000 INJECTION, SOLUTION INTRAVENOUS; SUBCUTANEOUS at 20:53

## 2018-11-19 RX ADMIN — Medication 10 ML: at 23:10

## 2018-11-19 RX ADMIN — SPIRONOLACTONE 25 MG: 25 TABLET ORAL at 08:07

## 2018-11-19 RX ADMIN — SERTRALINE 75 MG: 50 TABLET, FILM COATED ORAL at 08:07

## 2018-11-19 RX ADMIN — ISOSORBIDE DINITRATE 20 MG: 10 TABLET ORAL at 13:01

## 2018-11-19 RX ADMIN — FERROUS SULFATE TAB EC 325 MG (65 MG FE EQUIVALENT) 325 MG: 325 (65 FE) TABLET DELAYED RESPONSE at 08:04

## 2018-11-19 RX ADMIN — GABAPENTIN 800 MG: 400 CAPSULE ORAL at 23:03

## 2018-11-19 RX ADMIN — HEPARIN SODIUM 5000 UNITS: 5000 INJECTION, SOLUTION INTRAVENOUS; SUBCUTANEOUS at 06:50

## 2018-11-19 RX ADMIN — AMLODIPINE BESYLATE 10 MG: 5 TABLET ORAL at 08:04

## 2018-11-19 RX ADMIN — ISOSORBIDE DINITRATE 20 MG: 10 TABLET ORAL at 18:08

## 2018-11-19 RX ADMIN — MOMETASONE FUROATE AND FORMOTEROL FUMARATE DIHYDRATE 2 PUFF: 100; 5 AEROSOL RESPIRATORY (INHALATION) at 09:31

## 2018-11-19 RX ADMIN — PANTOPRAZOLE SODIUM 40 MG: 40 TABLET, DELAYED RELEASE ORAL at 08:03

## 2018-11-19 RX ADMIN — OXYCODONE HYDROCHLORIDE 5 MG: 5 TABLET ORAL at 13:58

## 2018-11-19 RX ADMIN — OXYCODONE HYDROCHLORIDE AND ACETAMINOPHEN 1 TABLET: 5; 325 TABLET ORAL at 08:16

## 2018-11-19 RX ADMIN — HYDRALAZINE HYDROCHLORIDE 25 MG: 25 TABLET, FILM COATED ORAL at 13:56

## 2018-11-19 RX ADMIN — ACETAMINOPHEN 650 MG: 325 TABLET ORAL at 20:50

## 2018-11-19 RX ADMIN — ISOSORBIDE DINITRATE 20 MG: 10 TABLET ORAL at 08:04

## 2018-11-19 RX ADMIN — TIZANIDINE 8 MG: 4 TABLET ORAL at 13:57

## 2018-11-19 RX ADMIN — GABAPENTIN 800 MG: 400 CAPSULE ORAL at 13:57

## 2018-11-19 RX ADMIN — ATORVASTATIN CALCIUM 40 MG: 80 TABLET, FILM COATED ORAL at 20:50

## 2018-11-19 RX ADMIN — LORAZEPAM 1 MG: 1 TABLET ORAL at 23:06

## 2018-11-19 RX ADMIN — OXYCODONE HYDROCHLORIDE AND ACETAMINOPHEN 1 TABLET: 5; 325 TABLET ORAL at 13:58

## 2018-11-19 RX ADMIN — FERROUS SULFATE TAB EC 325 MG (65 MG FE EQUIVALENT) 325 MG: 325 (65 FE) TABLET DELAYED RESPONSE at 18:08

## 2018-11-19 RX ADMIN — OXYCODONE HYDROCHLORIDE 5 MG: 5 TABLET ORAL at 20:50

## 2018-11-19 ASSESSMENT — PAIN SCALES - GENERAL
PAINLEVEL_OUTOF10: 3
PAINLEVEL_OUTOF10: 3
PAINLEVEL_OUTOF10: 5
PAINLEVEL_OUTOF10: 6
PAINLEVEL_OUTOF10: 7
PAINLEVEL_OUTOF10: 7
PAINLEVEL_OUTOF10: 6
PAINLEVEL_OUTOF10: 3

## 2018-11-19 ASSESSMENT — ENCOUNTER SYMPTOMS
ABDOMINAL PAIN: 0
VOMITING: 0
NAUSEA: 0
WHEEZING: 0
DIARRHEA: 0
SHORTNESS OF BREATH: 0

## 2018-11-19 ASSESSMENT — PAIN DESCRIPTION - DESCRIPTORS
DESCRIPTORS: CRAMPING
DESCRIPTORS: ACHING;DISCOMFORT

## 2018-11-19 ASSESSMENT — PAIN DESCRIPTION - LOCATION
LOCATION: ARM;ABDOMEN
LOCATION: GENERALIZED;ABDOMEN

## 2018-11-19 ASSESSMENT — PAIN DESCRIPTION - FREQUENCY: FREQUENCY: INTERMITTENT

## 2018-11-19 ASSESSMENT — PAIN DESCRIPTION - ORIENTATION: ORIENTATION: RIGHT

## 2018-11-19 ASSESSMENT — PAIN DESCRIPTION - PAIN TYPE
TYPE: ACUTE PAIN
TYPE: ACUTE PAIN

## 2018-11-19 NOTE — PROGRESS NOTES
Smoking Cessation - topics covered   []  Health Risks  []  Benefits of Quitting   []  Smoking Cessation  []  Patient has no history of tobacco use  [x]  Patient is former smoker. Patient quit in 2018. [x]  No need for tobacco cessation education. []  Booklet given  []  Patient verbalizes understanding. []  Patient denies need for tobacco cessation education.   Jose Jacome  8:43 AM

## 2018-11-19 NOTE — PROGRESS NOTES
Physical Therapy  DATE: 2018    NAME: El Alonso  MRN: [id number]   : 1975    Patient not seen this date for Physical Therapy due to:  [] Blood transfusion in progress  [] Hemodialysis  [x]  Patient Declined  -  Pt c/o \"abdominal cramps\" requested to be checked on tomorrow.  [] Spine Precautions   [] Strict Bedrest  [] Surgery/ Procedure  [] Testing      [] Other        [] PT being discontinued at this time. Patient independent. No further needs. [] PT being discontinued at this time as the patient has been transferred to palliative care. No further needs.     Juanpablo Lemos, NAE

## 2018-11-19 NOTE — PROGRESS NOTES
TROPONINI  0.00  0.00     BNP: No results for input(s): BNP in the last 72 hours. Lipids:   Recent Labs      11/17/18   0628   CHOL  144   HDL  43     INR: No results for input(s): INR in the last 72 hours. Objective:   Vitals: BP (!) 112/51   Pulse 78   Temp 98.6 °F (37 °C) (Oral)   Resp 11   Ht 5' (1.524 m)   Wt (!) 358 lb 14.4 oz (162.8 kg)   LMP 10/01/2018   SpO2 100%   BMI 70.09 kg/m²   General appearance: alert and cooperative with exam  HEENT: Head: Normocephalic, no lesions, without obvious abnormality. Neck:no JVD, trachea midline, no adenopathy  Lungs: Clear to auscultation throughout  Heart: Regular rate and rhythm, s1/s2 auscultated, no murmurs. SR  Abdomen: soft, non-tender, bowel sounds active. Morbidly obese  Extremities: no edema  Neurologic: not done        Assessment / Acute Cardiac Problems:   1. Acute on chronic diastolic CHF  2. Morbid obesity  3. HIEN  4.  COPD with chronic resp failure on home oxygen    Patient Active Problem List:     Pneumonia of both lower lobes due to infectious organism [facility])     NSTEMI (non-ST elevated myocardial infarction) ([address]     Acute pulmonary edema (HCC)     Hypertensive emergency     Acute respiratory failure with hypoxia and hypercapnia (HCC)     Smoker     Morbid obesity (HCC)     Obesity hypoventilation syndrome (HCC)     SOB (shortness of breath)     COPD (chronic obstructive pulmonary disease) (HCC)     ARDS (adult respiratory distress syndrome) (HCC)     Fever     Disease due to rhinovirus     Encounter for PEG (percutaneous endoscopic gastrostomy) ([address]     Status post tracheostomy ([address]     Status post insertion of percutaneous endoscopic gastrostomy (PEG) tube ([address]     Rhinovirus infection     Acute non-recurrent pansinusitis     Acute on chronic diastolic CHF (congestive heart failure) (HCC)     Chronic respiratory failure (HCC)     Acute on chronic congestive heart failure ([address]     Pulmonary hypertension (HCC)     Chronic narcotic dependence ([facility] [address]     Chronically on benzodiazepine therapy     Neuropathy     Epigastric pain      Plan of Treatment:   1. Appreciate neprho input on diuretics. Discussed in detail with patient. Questions and concerns addressed.  Follow    Electronically signed by TOLU Bose CNP on 11/19/2018 at 10:14 AM  [address]  [phone]

## 2018-11-19 NOTE — PROGRESS NOTES
duplex)  Home Layout: One level  Home Access: Stairs to enter with rails  Entrance Stairs - Number of Steps: 3  Entrance Stairs - Rails: Right  Bathroom Shower/Tub: Tub only  Bathroom Toilet: Bedside commode (and standard height toilet; pt reports primarily using BSC)  Bathroom Equipment: Tub transfer bench  Bathroom Accessibility: Accessible  Home Equipment: Rolling walker, Wheelchair-electric, [address] 450 bed, Oxygen (Pt reports use of 2.5-3L O2 at all times)  Receives Help From: Family, Friend(s), Home health (Pt reports supportive son. Pt states HHA 3x/wk for 5hrs in AM and 2hrs in PM, and 5hrs on weekend. Pt states home RN 2-3x/wk for vitals and med mngt. )  ADL Assistance: Needs assistance (Pt reports HHA assists with LB bathing/dressing tasks)  Homemaking Assistance: Needs assistance  Homemaking Responsibilities: No (pt reports HHA and son perform IADLs; pt states ability to perform simple meal prep task if needed )  Ambulation Assistance: Independent (pt reports use of electric wheelchair for community distances however states ability to perform functional mobility within the home with mod IND using RW)  Transfer Assistance: Independent  Active : No  Patient's  Info: pt reports son drives or use of med cab  Mode of Transportation:  (Depending on mobility may need assist)  Occupation: Unemployed (Pt reports in the process of applying for disability)  Leisure & Hobbies: Enjoys spending time/visiting with family  Additional Comments: Pt reports that plans for a ramp to be installed and bathroom to be remodeled are in place to allow pt more accessibility and safety within the home.      Objective   Vision: Impaired  Vision Exceptions: Wears glasses at all times  Hearing: Within functional limits    Orientation  Overall Orientation Status: Within Functional Limits     Balance  Sitting Balance: Contact guard assistance (unsupported seated EOB ~8 minutes)  Standing Balance:  (Pt with complaint of significant SOB, lightheadedness and dizziness while seated EOB. Pt unable to keep eyes open due to lightheadedness and stated the room was spinning therefore pt returned to supine and OOB activity not attempted this date.)     ADL  Feeding: Increased time to complete;Setup  Grooming: Increased time to complete;Minimal assistance (seated EOB to perform facial hygiene)  UE Bathing: Moderate assistance; Increased time to complete  LE Bathing: Maximum assistance; Increased time to complete  UE Dressing: Moderate assistance; Increased time to complete (seated EOB to don/doff hospital gown)  LE Dressing: Maximum assistance; Increased time to complete (seated EOB to don/doff socks)  Toileting: Maximum assistance; Increased time to complete  Additional Comments: Pt required increased time and multiple rest breaks during ADL participation secondary to SOB with minimal exertion. RUE Tone: Normotonic  LUE Tone: Normotonic  Movements Are Fluid And Coordinated: Yes     Bed mobility  Supine to Sit: Moderate assistance (with raised HOB and use of bed rails)  Sit to Supine: Moderate assistance (with use of bed rails)  Scooting: Moderate assistance  Comment: Pt required increased time and 2x rest breaks to perform bed mobility tasks secondary to SOB/fatigue with minimal exertion. Cognition  Overall Cognitive Status: WFL       Sensation  Overall Sensation Status: Impaired  Additional Comments: Pt reports numbness in her right hand secondary to neuropathy    LUE AROM : WFL  RUE AROM : WFL  Gross LUE Strength: WFL (grossly 4-/5)  Gross RUE Strength: WFL (grossly 4-/5)  Comments: Pt with generalized weakness/fatigue noted throughout session. Assessment   Performance deficits / Impairments: Decreased functional mobility ; Decreased ADL status; Decreased strength;Decreased endurance;Decreased balance;Decreased sensation  Comments: Pt limited in safely/independently performing ADLs and functional transfers/functional recommendations discussed with patient during initial evaluation.     William Ng, OTR/L

## 2018-11-19 NOTE — PLAN OF CARE
Problem: Falls - Risk of:  Goal: Absence of physical injury  Absence of physical injury   Outcome: Ongoing  Patient remains free of injury, uses call light when needed.

## 2018-11-19 NOTE — PROGRESS NOTES
oriented to person, place, and time. She exhibits normal muscle tone. Skin: Skin is warm and dry. No rash noted. Psychiatric: She has a normal mood and affect. Her behavior is normal.   Nursing note and vitals reviewed. Assessment:        Principal Problem:    Acute on chronic diastolic CHF (congestive heart failure) ([facility])  Active Problems:    Smoker    Morbid obesity (HCC)    Obesity hypoventilation syndrome (HCC)    COPD (chronic obstructive pulmonary disease) (HCC)    Chronic respiratory failure (HCC)    Pulmonary hypertension (HCC)    Chronic narcotic dependence ([facility] [address]    Chronically on benzodiazepine therapy    Neuropathy    Epigastric pain    Muscle spasm  Resolved Problems:    * No resolved hospital problems. *      Plan:        1. Acute on chronic diastolic CHF (congestive heart failure) : Cardiac diet,  spironolactone. Continue home medication, cardiology evaluation, CHF clinic consultation. Monitor creatinine, nephrology consult per cardiology. Originally Lasix increased to 40 mg IB TD and metalozone 5 mg daily by cardiology , hold the diuretics today by nephrology and adjusted to torsemide  20 mg BID up from 30 daily at home and spironolactone  On discharge with no metalozone by Dr. Phyllis Kimbrough, discussed in details with, nephrologist  2. Smoker : Nicotine patches  3. Obesity hypoventilation syndrome / COPD (chronic obstructive pulmonary disease) /  Chronic respiratory failure / Pulmonary hypertension : Complicating factors, : stable. Respiratory care, inhalers, oxygen ,continous pulse ox, spirometer , no acute exacerbation, bipap as needed  4. Chronic narcotic dependence /  Chronically on benzodiazepine therapy : Resume home doses , counseling  5. Neuropathy: On gabapentin 800 mg 3 times a day  6. Muscle spasms: Zanaflex as needed  7. Epigastric pain: Protonix, monitor response  8. HTN: stable,resume home meds  9. Check Electrolytes and Electrolytes replacementas needed   10.  Morbid obesity

## 2018-11-19 NOTE — PROGRESS NOTES
NEPHROLOGY PROGRESS NOTE      SUBJECTIVE     Admitted with fluid retention. On IV Lasix 3 times a day. Urine output 5.2 L last 24 hours. Overall negative by 7 L. Symptomatically marked improvement. Renal function worse likely secondary from diuresis. Echo reviewed. OBJECTIVE     Vitals:    11/19/18 0631 11/19/18 0655 11/19/18 0753 11/19/18 0803   BP: 137/76  (!) 112/51 (!) 112/51   Pulse: 78      Resp:       Temp: 98.6 °F (37 °C)      TempSrc: Oral      SpO2: 100%      Weight:  (!) 358 lb 14.4 oz (162.8 kg)     Height:         24HR INTAKE/OUTPUT:    Intake/Output Summary (Last 24 hours) at 11/19/18 0818  Last data filed at 11/19/18 0500   Gross per 24 hour   Intake              300 ml   Output             5225 ml   Net            -4925 ml       General appearance:Awake, alert, in no acute distress  HEENT: PERRLA  Respiratory::vesicular breath sounds,no wheeze/crackles  Cardiovascular:S1 S2 normal,no gallop or organic murmur. Abdomen:Non tender/non distended. Bowel sounds present  Extremities: No Cyanosis or Clubbing,Lower extremity edema  Neurological:Alert and oriented. No abnormalities of mood, affect, memory, mentation, or behavior are noted      MEDICATIONS     Scheduled Meds:    gabapentin  800 mg Oral TID    amLODIPine  10 mg Oral Daily    atorvastatin  40 mg Oral Nightly    ferrous sulfate  325 mg Oral BID WC    fluticasone  1 spray Nasal Daily    hydrALAZINE  25 mg Oral 3 times per day    isosorbide dinitrate  20 mg Oral TID WC    mometasone-formoterol  2 puff Inhalation BID    sertraline  75 mg Oral Daily    spironolactone  25 mg Oral Daily    tiotropium  18 mcg Inhalation Daily    sodium chloride flush  10 mL Intravenous 2 times per day    docusate sodium  100 mg Oral BID    lisinopril  5 mg Oral Daily    pantoprazole  40 mg Oral QAM AC    heparin (porcine)  5,000 Units Subcutaneous 3 times per day     Continuous Infusions:   PRN Meds:  magnesium sulfate, potassium chloride **OR**

## 2018-11-20 PROBLEM — I50.33 ACUTE ON CHRONIC DIASTOLIC CHF (CONGESTIVE HEART FAILURE) (HCC): Status: RESOLVED | Noted: 2018-09-15 | Resolved: 2018-11-20

## 2018-11-20 PROBLEM — N17.9 AKI (ACUTE KIDNEY INJURY) (HCC): Status: ACTIVE | Noted: 2018-11-20

## 2018-11-20 LAB
ANION GAP SERPL CALCULATED.3IONS-SCNC: 12 MMOL/L (ref 9–17)
BUN BLDV-MCNC: 28 MG/DL (ref 6–20)
BUN/CREAT BLD: ABNORMAL (ref 9–20)
CALCIUM SERPL-MCNC: 8.7 MG/DL (ref 8.6–10.4)
CHLORIDE BLD-SCNC: 87 MMOL/L (ref 98–107)
CO2: 37 MMOL/L (ref 20–31)
CREAT SERPL-MCNC: 1.39 MG/DL (ref 0.5–0.9)
GFR AFRICAN AMERICAN: 50 ML/MIN
GFR NON-AFRICAN AMERICAN: 41 ML/MIN
GFR SERPL CREATININE-BSD FRML MDRD: ABNORMAL ML/MIN/{1.73_M2}
GFR SERPL CREATININE-BSD FRML MDRD: ABNORMAL ML/MIN/{1.73_M2}
GLUCOSE BLD-MCNC: 100 MG/DL (ref 70–99)
POTASSIUM SERPL-SCNC: 3.8 MMOL/L (ref 3.7–5.3)
SODIUM BLD-SCNC: 136 MMOL/L (ref 135–144)

## 2018-11-20 PROCEDURE — 97116 GAIT TRAINING THERAPY: CPT

## 2018-11-20 PROCEDURE — 2700000000 HC OXYGEN THERAPY PER DAY

## 2018-11-20 PROCEDURE — 97110 THERAPEUTIC EXERCISES: CPT

## 2018-11-20 PROCEDURE — 6370000000 HC RX 637 (ALT 250 FOR IP): Performed by: FAMILY MEDICINE

## 2018-11-20 PROCEDURE — 80048 BASIC METABOLIC PNL TOTAL CA: CPT

## 2018-11-20 PROCEDURE — 94640 AIRWAY INHALATION TREATMENT: CPT

## 2018-11-20 PROCEDURE — 2060000000 HC ICU INTERMEDIATE R&B

## 2018-11-20 PROCEDURE — 94660 CPAP INITIATION&MGMT: CPT

## 2018-11-20 PROCEDURE — 99232 SBSQ HOSP IP/OBS MODERATE 35: CPT | Performed by: FAMILY MEDICINE

## 2018-11-20 PROCEDURE — 97530 THERAPEUTIC ACTIVITIES: CPT

## 2018-11-20 PROCEDURE — 6360000002 HC RX W HCPCS: Performed by: FAMILY MEDICINE

## 2018-11-20 PROCEDURE — 2580000003 HC RX 258: Performed by: FAMILY MEDICINE

## 2018-11-20 PROCEDURE — 36415 COLL VENOUS BLD VENIPUNCTURE: CPT

## 2018-11-20 RX ORDER — TORSEMIDE 20 MG/1
20 TABLET ORAL 2 TIMES DAILY
Status: DISCONTINUED | OUTPATIENT
Start: 2018-11-21 | End: 2018-11-21 | Stop reason: HOSPADM

## 2018-11-20 RX ADMIN — TIOTROPIUM BROMIDE 18 MCG: 18 CAPSULE ORAL; RESPIRATORY (INHALATION) at 08:13

## 2018-11-20 RX ADMIN — ISOSORBIDE DINITRATE 20 MG: 10 TABLET ORAL at 18:11

## 2018-11-20 RX ADMIN — ISOSORBIDE DINITRATE 20 MG: 10 TABLET ORAL at 08:23

## 2018-11-20 RX ADMIN — MOMETASONE FUROATE AND FORMOTEROL FUMARATE DIHYDRATE 2 PUFF: 100; 5 AEROSOL RESPIRATORY (INHALATION) at 23:12

## 2018-11-20 RX ADMIN — OXYCODONE HYDROCHLORIDE AND ACETAMINOPHEN 1 TABLET: 5; 325 TABLET ORAL at 08:24

## 2018-11-20 RX ADMIN — OXYCODONE HYDROCHLORIDE 5 MG: 5 TABLET ORAL at 13:45

## 2018-11-20 RX ADMIN — Medication 10 ML: at 20:26

## 2018-11-20 RX ADMIN — Medication 10 ML: at 08:25

## 2018-11-20 RX ADMIN — OXYCODONE HYDROCHLORIDE 5 MG: 5 TABLET ORAL at 08:24

## 2018-11-20 RX ADMIN — FLUTICASONE PROPIONATE 1 SPRAY: 50 SPRAY, METERED NASAL at 08:32

## 2018-11-20 RX ADMIN — LORAZEPAM 1 MG: 1 TABLET ORAL at 23:13

## 2018-11-20 RX ADMIN — ATORVASTATIN CALCIUM 40 MG: 80 TABLET, FILM COATED ORAL at 20:21

## 2018-11-20 RX ADMIN — HEPARIN SODIUM 5000 UNITS: 5000 INJECTION, SOLUTION INTRAVENOUS; SUBCUTANEOUS at 22:05

## 2018-11-20 RX ADMIN — OXYCODONE HYDROCHLORIDE AND ACETAMINOPHEN 1 TABLET: 5; 325 TABLET ORAL at 13:45

## 2018-11-20 RX ADMIN — HYDRALAZINE HYDROCHLORIDE 25 MG: 25 TABLET, FILM COATED ORAL at 13:05

## 2018-11-20 RX ADMIN — GABAPENTIN 800 MG: 400 CAPSULE ORAL at 20:21

## 2018-11-20 RX ADMIN — MOMETASONE FUROATE AND FORMOTEROL FUMARATE DIHYDRATE 2 PUFF: 100; 5 AEROSOL RESPIRATORY (INHALATION) at 08:13

## 2018-11-20 RX ADMIN — OXYCODONE HYDROCHLORIDE AND ACETAMINOPHEN 1 TABLET: 5; 325 TABLET ORAL at 20:16

## 2018-11-20 RX ADMIN — HEPARIN SODIUM 5000 UNITS: 5000 INJECTION, SOLUTION INTRAVENOUS; SUBCUTANEOUS at 13:05

## 2018-11-20 RX ADMIN — OXYCODONE HYDROCHLORIDE 5 MG: 5 TABLET ORAL at 20:16

## 2018-11-20 RX ADMIN — PANTOPRAZOLE SODIUM 40 MG: 40 TABLET, DELAYED RELEASE ORAL at 08:23

## 2018-11-20 RX ADMIN — HEPARIN SODIUM 5000 UNITS: 5000 INJECTION, SOLUTION INTRAVENOUS; SUBCUTANEOUS at 08:24

## 2018-11-20 RX ADMIN — GABAPENTIN 800 MG: 400 CAPSULE ORAL at 13:05

## 2018-11-20 RX ADMIN — FERROUS SULFATE TAB EC 325 MG (65 MG FE EQUIVALENT) 325 MG: 325 (65 FE) TABLET DELAYED RESPONSE at 17:00

## 2018-11-20 RX ADMIN — FERROUS SULFATE TAB EC 325 MG (65 MG FE EQUIVALENT) 325 MG: 325 (65 FE) TABLET DELAYED RESPONSE at 08:23

## 2018-11-20 RX ADMIN — GABAPENTIN 800 MG: 400 CAPSULE ORAL at 08:32

## 2018-11-20 RX ADMIN — SERTRALINE 75 MG: 50 TABLET, FILM COATED ORAL at 08:23

## 2018-11-20 RX ADMIN — ISOSORBIDE DINITRATE 20 MG: 10 TABLET ORAL at 13:04

## 2018-11-20 RX ADMIN — SPIRONOLACTONE 25 MG: 25 TABLET ORAL at 08:23

## 2018-11-20 ASSESSMENT — ENCOUNTER SYMPTOMS
WHEEZING: 0
ABDOMINAL PAIN: 0
VOMITING: 0
NAUSEA: 0
DIARRHEA: 0
SHORTNESS OF BREATH: 0

## 2018-11-20 ASSESSMENT — PAIN SCALES - GENERAL
PAINLEVEL_OUTOF10: 0
PAINLEVEL_OUTOF10: 0
PAINLEVEL_OUTOF10: 7
PAINLEVEL_OUTOF10: 0
PAINLEVEL_OUTOF10: 0
PAINLEVEL_OUTOF10: 7
PAINLEVEL_OUTOF10: 0
PAINLEVEL_OUTOF10: 0
PAINLEVEL_OUTOF10: 8

## 2018-11-20 ASSESSMENT — PAIN DESCRIPTION - PAIN TYPE: TYPE: ACUTE PAIN

## 2018-11-20 NOTE — PROGRESS NOTES
Contact guard assistance  Stand to sit: Contact guard assistance  Ambulation  Ambulation?: Yes  Ambulation 1  Surface: level tile  Device: Rolling walker  Other Apparatus: O2  Assistance: Contact guard assistance  Quality of Gait: decreased step length, wide NOEL, waddle like gait, decreased gait speed, impaired endurance  Distance: 14ft x 2; 2 standing rest breaks needed for with 1st ambulation  Comments: SOB noted after ambulation, O2 WFL, HR increased up to 111-115bpm   Stairs/Curb  Stairs?: No     Balance  Posture: Fair  Sitting - Static: Good  Sitting - Dynamic: Good;-  Standing - Static: Fair  Standing - Dynamic: Fair;-  Comments: Pt sat EOB ~5mins x 2 SBA, static standing on scale CGA x ~1min  Other exercises  Other exercises?: Yes  Upper extremity exercises: Bicep curl, shoulder flexion/extension, punches, tricep curl, shoulder abduction/adduction. Reps: x 15  Seated LE exercise program: Long Arc Quads, hip abduction/adduction, heel/toe raises, and marches. Reps: x 15        Assessment   Body structures, Functions, Activity limitations: Decreased functional mobility ; Decreased strength;Decreased endurance;Decreased balance  Assessment: Pt is most limited by her impaired endurance which in turn limits her overall mobility. Pt able to ambulate 14ft x2 CGA with RW this date. Pt requires short rest break after each attempt of mobility secondary to fatigue and SOB. Pt mobility severely limited at baseline, pt prefers to return home; Pt is not safe to amb steps at this time but reports ambulance has lifted her over steps and gotten her into her home in the past; If ambulance is unable to lift pt up steps to safely get into her home she will need SNF placement  Prognosis: Good;Fair  Patient Education: Discharge recommendation  REQUIRES PT FOLLOW UP: Yes  Activity Tolerance  Activity Tolerance: Patient limited by fatigue;Patient limited by endurance; Other  Activity Tolerance: SOB                  Goals  Short term

## 2018-11-20 NOTE — PROGRESS NOTES
weight currently per patient's statement. Patient also reported that the increase chest pain and abdominal discomfort with nausea but no vomiting with on and off diarrhea    At ED BMP was slightly elevated. Chest x-ray was remarkable for pulmonary vascular congestion      Patient has recent admission for CHF exacerbation. Echo was done remarkable for diastolic dysfunction and pulmonary hypertension. Patient is morbidly obese with BMI of 75.9    and she is admitted to the hospital for the management of Acute on chronic diastolic CHF (congestive heart failure)      Review of Systems:   Review of Systems   Constitutional: Positive for fatigue. Negative for activity change, appetite change and fever. Respiratory: Negative for shortness of breath and wheezing. Cardiovascular: Positive for leg swelling. Negative for chest pain. Gastrointestinal: Negative for abdominal pain, diarrhea, nausea and vomiting. Neurological: Negative for syncope and headaches. Medications: Allergies:     Allergies   Allergen Reactions    Asa [Aspirin]     Beta Adrenergic Blockers Other (See Comments)     Asystole and Bradycardia on admission  01/26/2018-2/22/2018 at [facility]    Sulfa Antibiotics        Current Meds:   Scheduled Meds:    gabapentin  800 mg Oral TID    atorvastatin  40 mg Oral Nightly    ferrous sulfate  325 mg Oral BID WC    fluticasone  1 spray Nasal Daily    hydrALAZINE  25 mg Oral 3 times per day    isosorbide dinitrate  20 mg Oral TID WC    mometasone-formoterol  2 puff Inhalation BID    sertraline  75 mg Oral Daily    spironolactone  25 mg Oral Daily    tiotropium  18 mcg Inhalation Daily    sodium chloride flush  10 mL Intravenous 2 times per day    docusate sodium  100 mg Oral BID    pantoprazole  40 mg Oral QAM AC    heparin (porcine)  5,000 Units Subcutaneous 3 times per day     Continuous Infusions:  PRN Meds: tiZANidine, magnesium sulfate, potassium chloride **OR** potassium chloride

## 2018-11-20 NOTE — PROGRESS NOTES
[facility])     Pulmonary hypertension ([facility] [facility]     Chronic narcotic dependence ([facility] [address]     Chronically on benzodiazepine therapy     Neuropathy     Epigastric pain      Plan of Treatment:   1. CHF. Stable. Continue diuretic per nephrology recommendations. CHF clinic on discharge. Long discussion regarding diet. Pt verbalizes understanding. 2.  Will follow up in office in 2 weeks.       Electronically signed by TOLU Muñiz CNP on 11/20/2018 at [address] [place] [address].  [phone]

## 2018-11-21 VITALS
BODY MASS INDEX: 57.52 KG/M2 | SYSTOLIC BLOOD PRESSURE: 125 MMHG | WEIGHT: 293 LBS | OXYGEN SATURATION: 98 % | DIASTOLIC BLOOD PRESSURE: 61 MMHG | HEIGHT: 60 IN | RESPIRATION RATE: 16 BRPM | HEART RATE: 77 BPM | TEMPERATURE: 98.2 F

## 2018-11-21 LAB
ANION GAP SERPL CALCULATED.3IONS-SCNC: 12 MMOL/L (ref 9–17)
BNP INTERPRETATION: NORMAL
BUN BLDV-MCNC: 33 MG/DL (ref 6–20)
BUN/CREAT BLD: ABNORMAL (ref 9–20)
CALCIUM SERPL-MCNC: 9.1 MG/DL (ref 8.6–10.4)
CHLORIDE BLD-SCNC: 87 MMOL/L (ref 98–107)
CO2: 36 MMOL/L (ref 20–31)
CREAT SERPL-MCNC: 1.15 MG/DL (ref 0.5–0.9)
GFR AFRICAN AMERICAN: >60 ML/MIN
GFR NON-AFRICAN AMERICAN: 52 ML/MIN
GFR SERPL CREATININE-BSD FRML MDRD: ABNORMAL ML/MIN/{1.73_M2}
GFR SERPL CREATININE-BSD FRML MDRD: ABNORMAL ML/MIN/{1.73_M2}
GLUCOSE BLD-MCNC: 112 MG/DL (ref 70–99)
POTASSIUM SERPL-SCNC: 4.2 MMOL/L (ref 3.7–5.3)
PRO-BNP: <20 PG/ML
SODIUM BLD-SCNC: 135 MMOL/L (ref 135–144)

## 2018-11-21 PROCEDURE — 2580000003 HC RX 258: Performed by: FAMILY MEDICINE

## 2018-11-21 PROCEDURE — 6370000000 HC RX 637 (ALT 250 FOR IP): Performed by: INTERNAL MEDICINE

## 2018-11-21 PROCEDURE — 6370000000 HC RX 637 (ALT 250 FOR IP): Performed by: FAMILY MEDICINE

## 2018-11-21 PROCEDURE — 94640 AIRWAY INHALATION TREATMENT: CPT

## 2018-11-21 PROCEDURE — 6360000002 HC RX W HCPCS: Performed by: FAMILY MEDICINE

## 2018-11-21 PROCEDURE — 80048 BASIC METABOLIC PNL TOTAL CA: CPT

## 2018-11-21 PROCEDURE — 2700000000 HC OXYGEN THERAPY PER DAY

## 2018-11-21 PROCEDURE — 36415 COLL VENOUS BLD VENIPUNCTURE: CPT

## 2018-11-21 PROCEDURE — 99239 HOSP IP/OBS DSCHRG MGMT >30: CPT | Performed by: FAMILY MEDICINE

## 2018-11-21 PROCEDURE — 83880 ASSAY OF NATRIURETIC PEPTIDE: CPT

## 2018-11-21 PROCEDURE — 94660 CPAP INITIATION&MGMT: CPT

## 2018-11-21 RX ORDER — SPIRONOLACTONE 25 MG/1
25 TABLET ORAL DAILY
Qty: 30 TABLET | Refills: 3 | Status: SHIPPED | OUTPATIENT
Start: 2018-11-22

## 2018-11-21 RX ORDER — TORSEMIDE 20 MG/1
20 TABLET ORAL 2 TIMES DAILY
Qty: 30 TABLET | Refills: 3 | Status: SHIPPED | OUTPATIENT
Start: 2018-11-21 | End: 2019-10-28 | Stop reason: SDUPTHER

## 2018-11-21 RX ADMIN — FLUTICASONE PROPIONATE 1 SPRAY: 50 SPRAY, METERED NASAL at 08:46

## 2018-11-21 RX ADMIN — MOMETASONE FUROATE AND FORMOTEROL FUMARATE DIHYDRATE 2 PUFF: 100; 5 AEROSOL RESPIRATORY (INHALATION) at 10:27

## 2018-11-21 RX ADMIN — GABAPENTIN 800 MG: 400 CAPSULE ORAL at 15:02

## 2018-11-21 RX ADMIN — Medication 10 ML: at 08:57

## 2018-11-21 RX ADMIN — TIOTROPIUM BROMIDE 18 MCG: 18 CAPSULE ORAL; RESPIRATORY (INHALATION) at 10:27

## 2018-11-21 RX ADMIN — OXYCODONE HYDROCHLORIDE 5 MG: 5 TABLET ORAL at 15:01

## 2018-11-21 RX ADMIN — SPIRONOLACTONE 25 MG: 25 TABLET ORAL at 08:47

## 2018-11-21 RX ADMIN — PANTOPRAZOLE SODIUM 40 MG: 40 TABLET, DELAYED RELEASE ORAL at 06:44

## 2018-11-21 RX ADMIN — ISOSORBIDE DINITRATE 20 MG: 10 TABLET ORAL at 08:47

## 2018-11-21 RX ADMIN — HEPARIN SODIUM 5000 UNITS: 5000 INJECTION, SOLUTION INTRAVENOUS; SUBCUTANEOUS at 06:44

## 2018-11-21 RX ADMIN — HYDRALAZINE HYDROCHLORIDE 25 MG: 25 TABLET, FILM COATED ORAL at 06:44

## 2018-11-21 RX ADMIN — OXYCODONE HYDROCHLORIDE 5 MG: 5 TABLET ORAL at 08:47

## 2018-11-21 RX ADMIN — SERTRALINE 75 MG: 50 TABLET, FILM COATED ORAL at 08:48

## 2018-11-21 RX ADMIN — FERROUS SULFATE TAB EC 325 MG (65 MG FE EQUIVALENT) 325 MG: 325 (65 FE) TABLET DELAYED RESPONSE at 08:47

## 2018-11-21 RX ADMIN — GABAPENTIN 800 MG: 400 CAPSULE ORAL at 08:46

## 2018-11-21 RX ADMIN — TORSEMIDE 20 MG: 20 TABLET ORAL at 08:47

## 2018-11-21 RX ADMIN — ISOSORBIDE DINITRATE 20 MG: 10 TABLET ORAL at 11:45

## 2018-11-21 ASSESSMENT — PAIN DESCRIPTION - LOCATION
LOCATION: ARM
LOCATION: ARM

## 2018-11-21 ASSESSMENT — PAIN SCALES - GENERAL
PAINLEVEL_OUTOF10: 4
PAINLEVEL_OUTOF10: 7
PAINLEVEL_OUTOF10: 2

## 2018-11-21 ASSESSMENT — PAIN DESCRIPTION - PAIN TYPE
TYPE: ACUTE PAIN
TYPE: ACUTE PAIN

## 2018-11-21 ASSESSMENT — PAIN DESCRIPTION - DESCRIPTORS
DESCRIPTORS: NUMBNESS;TINGLING
DESCRIPTORS: NUMBNESS;TINGLING

## 2018-11-21 ASSESSMENT — ENCOUNTER SYMPTOMS
NAUSEA: 0
DIARRHEA: 0
BLOOD IN STOOL: 0
SORE THROAT: 0
WHEEZING: 0
ABDOMINAL PAIN: 0
VOMITING: 0
SINUS PRESSURE: 0
CONSTIPATION: 0
SHORTNESS OF BREATH: 0
COUGH: 0
VOICE CHANGE: 0

## 2018-11-21 ASSESSMENT — PAIN DESCRIPTION - ORIENTATION
ORIENTATION: RIGHT
ORIENTATION: RIGHT

## 2018-11-21 ASSESSMENT — PAIN DESCRIPTION - FREQUENCY: FREQUENCY: INTERMITTENT

## 2018-11-21 NOTE — PROGRESS NOTES
guarding. Lymphadenopathy:     She has no cervical adenopathy. Neurological: She is alert and oriented to person, place, and time. No cranial nerve deficit. She exhibits normal muscle tone. Skin: She is not diaphoretic. Nursing note and vitals reviewed. Lower Extremities : No ankle Edema , No calf Tenderness     Laboratory findings:    No results for input(s): WBC, HGB, HCT, PLT, SEDRATE, INR in the last 72 hours. Invalid input(s): PT  Recent Labs      11/19/18   0504  11/20/18   0628  11/21/18   0540   NA  138  136  135   K  4.2  3.8  4.2   CL  84*  87*  87*   CO2  39*  37*  36*   GLUCOSE  117*  100*  112*   BUN  17  28*  33*   CREATININE  1.21*  1.39*  1.15*   CALCIUM  8.9  8.7  9.1     No results for input(s): PROT, LABALBU, LABA1C, N2FRGEM, C1MVAYG, FT4, TSH, AST, ALT, LDH, GGT, ALKPHOS, BILITOT, BILIDIR, AMMONIA, AMYLASE, LIPASE, LACTATE, CHOL, HDL, LDLCHOLESTEROL, CHOLHDLRATIO, TRIG, VLDL, BNP, TROPONINI, CKTOTAL, CKMB, CKMBINDEX, RF, LAUREN in the last 72 hours. Specific Gravity, UA   Date Value Ref Range Status   01/26/2018 1.013 1.005 - 1.030 Final     Protein, UA   Date Value Ref Range Status   01/26/2018 TRACE (A) NEG Final     RBC, UA   Date Value Ref Range Status   01/26/2018 0 TO 2 0 - 2 /HPF Final     Bacteria, UA   Date Value Ref Range Status   01/26/2018 NOT REPORTED NONE Final     Nitrite, Urine   Date Value Ref Range Status   01/26/2018 NEGATIVE NEG Final     WBC, UA   Date Value Ref Range Status   01/26/2018 2 TO 5 0 - 5 /HPF Final     Leukocyte Esterase, Urine   Date Value Ref Range Status   01/26/2018 NEGATIVE NEG Final     Comment:     [facility] [id number] [place] [address], [address] [place] [address] [phone]     Lab Results   Component Value Date    LABA1C 5.2 01/27/2018     Lab Results   Component Value Date    TSH 1.93 11/17/2018       Echocardiogram 9/19/18-  LV normal in size with hyperdynamic systolic function.   Increased LV wall thickness, EF 65%, evidence of diastolic

## 2018-11-21 NOTE — PROGRESS NOTES
NEPHROLOGY PROGRESS NOTE      SUBJECTIVE     Admitted with fluid retention. Was on IV Lasix 3 times a day. Urine output 1.5 L last 24 hours and 5.2 L day before that. Diuretics held yesterday. Already made 1.7 L today. Overall negative by 8.9 L. Symptomatically marked improvement. Renal function worse likely secondary from diuresis. Echo reviewed. OBJECTIVE     Vitals:    11/20/18 1415 11/20/18 1600 11/20/18 2020 11/20/18 2315   BP:  131/62 (!) 124/45    Pulse:  77 80    Resp:  18  16   Temp:  98.2 °F (36.8 °C)     TempSrc:  Oral     SpO2:  97%     Weight: (!) 335 lb 9.6 oz (152.2 kg)      Height:         24HR INTAKE/OUTPUT:      Intake/Output Summary (Last 24 hours) at 11/20/18 2335  Last data filed at 11/20/18 1821   Gross per 24 hour   Intake             1200 ml   Output             1700 ml   Net             -500 ml       General appearance:Awake, alert, in no acute distress  HEENT: PERRLA  Respiratory::vesicular breath sounds,no wheeze/crackles  Cardiovascular:S1 S2 normal,no gallop or organic murmur. Abdomen:Non tender/non distended. Bowel sounds present  Extremities: No Cyanosis or Clubbing, +ve trace Lower extremity edema  Neurological:Alert and oriented. No abnormalities of mood, affect, memory, mentation, or behavior are noted      MEDICATIONS     Scheduled Meds:    [START ON 11/21/2018] torsemide  20 mg Oral BID    gabapentin  800 mg Oral TID    atorvastatin  40 mg Oral Nightly    ferrous sulfate  325 mg Oral BID WC    fluticasone  1 spray Nasal Daily    hydrALAZINE  25 mg Oral 3 times per day    isosorbide dinitrate  20 mg Oral TID WC    mometasone-formoterol  2 puff Inhalation BID    sertraline  75 mg Oral Daily    spironolactone  25 mg Oral Daily    tiotropium  18 mcg Inhalation Daily    sodium chloride flush  10 mL Intravenous 2 times per day    docusate sodium  100 mg Oral BID    pantoprazole  40 mg Oral QAM AC    heparin (porcine)  5,000 Units Subcutaneous 3 times per day

## 2018-11-21 NOTE — PROGRESS NOTES
NEPHROLOGY PROGRESS NOTE      SUBJECTIVE     Admitted with fluid retention. On oral diuretics now. Symptomatically much improved. Renal function improving. Reports no shortness of breath or chest pain. Decent diuresis. OBJECTIVE     Vitals:    11/20/18 2315 11/21/18 0349 11/21/18 0640 11/21/18 0644   BP:    (!) 135/53   Pulse:       Resp: 16 14 15    Temp:   98.2 °F (36.8 °C)    TempSrc:   Oral    SpO2:   98%    Weight:       Height:         24HR INTAKE/OUTPUT:      Intake/Output Summary (Last 24 hours) at 11/21/18 0810  Last data filed at 11/20/18 1821   Gross per 24 hour   Intake             1200 ml   Output             1700 ml   Net             -500 ml       General appearance:Awake, alert, in no acute distress  HEENT: PERRLA  Respiratory::vesicular breath sounds,no wheeze/crackles  Cardiovascular:S1 S2 normal,no gallop or organic murmur. Abdomen:Non tender/non distended. Bowel sounds present  Extremities: No Cyanosis or Clubbing,Lower extremity edema  Neurological:Alert and oriented. No abnormalities of mood, affect, memory, mentation, or behavior are noted      MEDICATIONS     Scheduled Meds:    torsemide  20 mg Oral BID    gabapentin  800 mg Oral TID    atorvastatin  40 mg Oral Nightly    ferrous sulfate  325 mg Oral BID WC    fluticasone  1 spray Nasal Daily    hydrALAZINE  25 mg Oral 3 times per day    isosorbide dinitrate  20 mg Oral TID WC    mometasone-formoterol  2 puff Inhalation BID    sertraline  75 mg Oral Daily    spironolactone  25 mg Oral Daily    tiotropium  18 mcg Inhalation Daily    sodium chloride flush  10 mL Intravenous 2 times per day    docusate sodium  100 mg Oral BID    pantoprazole  40 mg Oral QAM AC    heparin (porcine)  5,000 Units Subcutaneous 3 times per day     Continuous Infusions:   PRN Meds:  tiZANidine, magnesium sulfate, potassium chloride **OR** potassium chloride **OR** potassium chloride, cloNIDine, sodium chloride flush, magnesium hydroxide, bisacodyl,

## 2018-12-21 ENCOUNTER — HOSPITAL ENCOUNTER (OUTPATIENT)
Age: 43
Discharge: HOME | End: 2018-12-21
Payer: MEDICARE

## 2018-12-21 ENCOUNTER — HOSPITAL ENCOUNTER (OUTPATIENT)
Dept: OTHER | Age: 43
Discharge: HOME | End: 2018-12-21
Payer: MEDICARE

## 2018-12-21 VITALS
DIASTOLIC BLOOD PRESSURE: 73 MMHG | BODY MASS INDEX: 73.24 KG/M2 | HEART RATE: 75 BPM | RESPIRATION RATE: 18 BRPM | WEIGHT: 293 LBS | OXYGEN SATURATION: 100 % | SYSTOLIC BLOOD PRESSURE: 144 MMHG

## 2018-12-21 LAB
ANION GAP SERPL CALCULATED.3IONS-SCNC: 15 MMOL/L (ref 9–17)
BUN BLDV-MCNC: 24 MG/DL (ref 6–20)
BUN/CREAT BLD: ABNORMAL (ref 9–20)
CALCIUM SERPL-MCNC: 9.2 MG/DL (ref 8.6–10.4)
CHLORIDE BLD-SCNC: 92 MMOL/L (ref 98–107)
CO2: 28 MMOL/L (ref 20–31)
CREAT SERPL-MCNC: 1.34 MG/DL (ref 0.5–0.9)
GFR AFRICAN AMERICAN: 52 ML/MIN
GFR NON-AFRICAN AMERICAN: 43 ML/MIN
GFR SERPL CREATININE-BSD FRML MDRD: ABNORMAL ML/MIN/{1.73_M2}
GFR SERPL CREATININE-BSD FRML MDRD: ABNORMAL ML/MIN/{1.73_M2}
GLUCOSE BLD-MCNC: 114 MG/DL (ref 70–99)
POTASSIUM SERPL-SCNC: 3.9 MMOL/L (ref 3.7–5.3)
SODIUM BLD-SCNC: 135 MMOL/L (ref 135–144)

## 2018-12-21 PROCEDURE — 80048 BASIC METABOLIC PNL TOTAL CA: CPT

## 2018-12-21 PROCEDURE — 99211 OFF/OP EST MAY X REQ PHY/QHP: CPT | Performed by: NURSE PRACTITIONER

## 2018-12-21 RX ORDER — PANTOPRAZOLE SODIUM 20 MG/1
20 TABLET, DELAYED RELEASE ORAL DAILY
COMMUNITY
End: 2019-10-28 | Stop reason: SDUPTHER

## 2018-12-21 RX ORDER — BUDESONIDE AND FORMOTEROL FUMARATE DIHYDRATE 160; 4.5 UG/1; UG/1
2 AEROSOL RESPIRATORY (INHALATION) 2 TIMES DAILY
COMMUNITY
End: 2019-10-28 | Stop reason: SDUPTHER

## 2018-12-21 RX ORDER — METOLAZONE 2.5 MG/1
2.5 TABLET ORAL DAILY
COMMUNITY
End: 2019-10-28 | Stop reason: ALTCHOICE

## 2018-12-21 ASSESSMENT — PAIN DESCRIPTION - LOCATION: LOCATION: ARM;SHOULDER

## 2018-12-21 ASSESSMENT — PAIN DESCRIPTION - ORIENTATION: ORIENTATION: RIGHT

## 2018-12-21 ASSESSMENT — PAIN DESCRIPTION - PAIN TYPE: TYPE: CHRONIC PAIN

## 2018-12-21 ASSESSMENT — PAIN SCALES - GENERAL: PAINLEVEL_OUTOF10: 6

## 2018-12-21 NOTE — PROGRESS NOTES
bathrooms. Add extra light switches or use remote switches (such as switches that go on or off when you clap your hands) to make it easier to turn lights on if you have to get up during the night. · Install sturdy handrails on stairways. Put grab bars near your shower, bathtub, and toilet. · Store household items on low shelves so that you do not have to climb or reach high. Or use a reaching device that you can get at a medical supply store. If you have to climb for something, use a step stool with handrails, or ask someone to get it for you. · Keep a cordless phone and a flashlight with new batteries by your bed. If possible, put a phone in each of the main rooms of your house, or carry a cell phone in case you fall and cannot reach a phone. Or you can wear a device around your neck or wrist. You push a button that sends a signal for help. · Wear low-heeled shoes that fit well and give your feet good support. Use footwear with nonskid soles. Check the heels and soles of your shoes for wear. Repair or replace worn heels or soles. · Do not wear socks without shoes on wood floors. · Walk on the grass when the sidewalks are slippery. If you live in an area that gets snow and ice in the winter, sprinkle salt on slippery steps and sidewalks. Where can you learn more? Go to https://[employer] PTYsusyeb.Seeqpod. org and sign in to your [employer] account. Enter M230 in the [facility] box to learn more about Diabetes and Preventing Falls: After Your Visit.     If you do not have an account, please click on the Sign Up Now link. © 3024-9002 [employer]wise[employer]. Care instructions adapted under license by [facility].  This care instruction is for use with your licensed healthcare professional. If you have questions about a medical condition or this instruction, always ask your healthcare professional. [address] any warranty or liability for your use of this information. Content Version: 30.4.430166;  Last Revised: August 6, 2013

## 2019-01-03 ENCOUNTER — TELEPHONE (OUTPATIENT)
Dept: OTHER | Age: 44
End: 2019-01-03

## 2019-02-22 ENCOUNTER — APPOINTMENT (OUTPATIENT)
Dept: GENERAL RADIOLOGY | Age: 44
DRG: 194 | End: 2019-02-22
Payer: MEDICARE

## 2019-02-22 ENCOUNTER — HOSPITAL ENCOUNTER (INPATIENT)
Age: 44
LOS: 4 days | Discharge: NURSING, REHAB OR LONG-TERM CARE | DRG: 194 | End: 2019-02-26
Attending: EMERGENCY MEDICINE | Admitting: INTERNAL MEDICINE
Payer: MEDICARE

## 2019-02-22 ENCOUNTER — HOSPITAL ENCOUNTER (OUTPATIENT)
Dept: OTHER | Age: 44
Discharge: HOME | End: 2019-02-22
Payer: MEDICARE

## 2019-02-22 ENCOUNTER — HOSPITAL ENCOUNTER (OUTPATIENT)
Age: 44
Discharge: HOME | End: 2019-02-22
Payer: MEDICARE

## 2019-02-22 VITALS
OXYGEN SATURATION: 100 % | WEIGHT: 293 LBS | SYSTOLIC BLOOD PRESSURE: 132 MMHG | BODY MASS INDEX: 79.92 KG/M2 | HEART RATE: 75 BPM | RESPIRATION RATE: 18 BRPM | DIASTOLIC BLOOD PRESSURE: 74 MMHG

## 2019-02-22 DIAGNOSIS — G47.33 OSA (OBSTRUCTIVE SLEEP APNEA): ICD-10-CM

## 2019-02-22 DIAGNOSIS — M23.209 OLD TEAR OF MENISCUS OF KNEE, UNSPECIFIED LATERALITY, UNSPECIFIED MENISCUS, UNSPECIFIED TEAR TYPE: ICD-10-CM

## 2019-02-22 DIAGNOSIS — I50.9 ACUTE ON CHRONIC CONGESTIVE HEART FAILURE, UNSPECIFIED HEART FAILURE TYPE (HCC): Primary | ICD-10-CM

## 2019-02-22 DIAGNOSIS — I50.33 ACUTE ON CHRONIC DIASTOLIC HEART FAILURE (HCC): ICD-10-CM

## 2019-02-22 DIAGNOSIS — R06.02 SHORTNESS OF BREATH: ICD-10-CM

## 2019-02-22 PROBLEM — I50.810 RIGHT HEART FAILURE, UNSPECIFIED (HCC): Status: ACTIVE | Noted: 2019-02-22

## 2019-02-22 LAB
ABSOLUTE EOS #: 0.19 K/UL (ref 0–0.44)
ABSOLUTE EOS #: 0.22 K/UL (ref 0–0.44)
ABSOLUTE IMMATURE GRANULOCYTE: 0.09 K/UL (ref 0–0.3)
ABSOLUTE IMMATURE GRANULOCYTE: 0.11 K/UL (ref 0–0.3)
ABSOLUTE LYMPH #: 1.68 K/UL (ref 1.1–3.7)
ABSOLUTE LYMPH #: 1.77 K/UL (ref 1.1–3.7)
ABSOLUTE MONO #: 0.63 K/UL (ref 0.1–1.2)
ABSOLUTE MONO #: 0.73 K/UL (ref 0.1–1.2)
ALLEN TEST: ABNORMAL
ALLEN TEST: POSITIVE
ANION GAP SERPL CALCULATED.3IONS-SCNC: 14 MMOL/L (ref 9–17)
ANION GAP SERPL CALCULATED.3IONS-SCNC: 14 MMOL/L (ref 9–17)
ANION GAP: 8 MMOL/L (ref 7–16)
BASOPHILS # BLD: 0 % (ref 0–2)
BASOPHILS # BLD: 0 % (ref 0–2)
BASOPHILS ABSOLUTE: <0.03 K/UL (ref 0–0.2)
BASOPHILS ABSOLUTE: <0.03 K/UL (ref 0–0.2)
BNP INTERPRETATION: NORMAL
BNP INTERPRETATION: NORMAL
BUN BLDV-MCNC: 14 MG/DL (ref 6–20)
BUN BLDV-MCNC: 14 MG/DL (ref 6–20)
BUN/CREAT BLD: ABNORMAL (ref 9–20)
BUN/CREAT BLD: ABNORMAL (ref 9–20)
CALCIUM IONIZED: 1.05 MMOL/L (ref 1.13–1.33)
CALCIUM SERPL-MCNC: 8.6 MG/DL (ref 8.6–10.4)
CALCIUM SERPL-MCNC: 8.6 MG/DL (ref 8.6–10.4)
CHLORIDE BLD-SCNC: 89 MMOL/L (ref 98–107)
CHLORIDE BLD-SCNC: 92 MMOL/L (ref 98–107)
CO2: 34 MMOL/L (ref 20–31)
CO2: 35 MMOL/L (ref 20–31)
CREAT SERPL-MCNC: 0.95 MG/DL (ref 0.5–0.9)
CREAT SERPL-MCNC: 0.95 MG/DL (ref 0.5–0.9)
DIFFERENTIAL TYPE: ABNORMAL
DIFFERENTIAL TYPE: ABNORMAL
EOSINOPHILS RELATIVE PERCENT: 2 % (ref 1–4)
EOSINOPHILS RELATIVE PERCENT: 2 % (ref 1–4)
FIO2: ABNORMAL
FIO2: ABNORMAL
GFR AFRICAN AMERICAN: >60 ML/MIN
GFR AFRICAN AMERICAN: >60 ML/MIN
GFR NON-AFRICAN AMERICAN: >60 ML/MIN
GFR SERPL CREATININE-BSD FRML MDRD: >60 ML/MIN
GFR SERPL CREATININE-BSD FRML MDRD: ABNORMAL ML/MIN/{1.73_M2}
GFR SERPL CREATININE-BSD FRML MDRD: NORMAL ML/MIN/{1.73_M2}
GLUCOSE BLD-MCNC: 135 MG/DL (ref 70–99)
GLUCOSE BLD-MCNC: 144 MG/DL (ref 70–99)
GLUCOSE BLD-MCNC: 181 MG/DL (ref 74–100)
HCG QUALITATIVE: NEGATIVE
HCO3 VENOUS: 42.1 MMOL/L (ref 22–29)
HCT VFR BLD CALC: 30 % (ref 36.3–47.1)
HCT VFR BLD CALC: 30.3 % (ref 36.3–47.1)
HEMOGLOBIN: 8.9 G/DL (ref 11.9–15.1)
HEMOGLOBIN: 9 G/DL (ref 11.9–15.1)
IMMATURE GRANULOCYTES: 1 %
IMMATURE GRANULOCYTES: 1 %
LYMPHOCYTES # BLD: 16 % (ref 24–43)
LYMPHOCYTES # BLD: 18 % (ref 24–43)
MAGNESIUM: 1.7 MG/DL (ref 1.6–2.6)
MCH RBC QN AUTO: 27.7 PG (ref 25.2–33.5)
MCH RBC QN AUTO: 28.3 PG (ref 25.2–33.5)
MCHC RBC AUTO-ENTMCNC: 29.7 G/DL (ref 28.4–34.8)
MCHC RBC AUTO-ENTMCNC: 29.7 G/DL (ref 28.4–34.8)
MCV RBC AUTO: 93.5 FL (ref 82.6–102.9)
MCV RBC AUTO: 95.3 FL (ref 82.6–102.9)
MODE: ABNORMAL
MODE: ABNORMAL
MONOCYTES # BLD: 7 % (ref 3–12)
MONOCYTES # BLD: 7 % (ref 3–12)
NEGATIVE BASE EXCESS, ART: ABNORMAL (ref 0–2)
NEGATIVE BASE EXCESS, VEN: ABNORMAL (ref 0–2)
NRBC AUTOMATED: 0 PER 100 WBC
NRBC AUTOMATED: 0 PER 100 WBC
O2 DEVICE/FLOW/%: ABNORMAL
O2 DEVICE/FLOW/%: ABNORMAL
O2 SAT, VEN: 35 % (ref 60–85)
PATIENT TEMP: ABNORMAL
PATIENT TEMP: ABNORMAL
PCO2, VEN: 70.4 MM HG (ref 41–51)
PDW BLD-RTO: 13.2 % (ref 11.8–14.4)
PDW BLD-RTO: 13.2 % (ref 11.8–14.4)
PH VENOUS: 7.38 (ref 7.32–7.43)
PHOSPHORUS: 2.4 MG/DL (ref 2.6–4.5)
PLATELET # BLD: 285 K/UL (ref 138–453)
PLATELET # BLD: 295 K/UL (ref 138–453)
PLATELET ESTIMATE: ABNORMAL
PLATELET ESTIMATE: ABNORMAL
PMV BLD AUTO: 10 FL (ref 8.1–13.5)
PMV BLD AUTO: 10.1 FL (ref 8.1–13.5)
PO2, VEN: 22.5 MM HG (ref 30–50)
POC CHLORIDE: 93 MMOL/L (ref 98–107)
POC CREATININE: 0.88 MG/DL (ref 0.51–1.19)
POC HCO3: 38.3 MMOL/L (ref 21–28)
POC HEMATOCRIT: 30 % (ref 36–46)
POC HEMOGLOBIN: 10.1 G/DL (ref 12–16)
POC IONIZED CALCIUM: 1.15 MMOL/L (ref 1.15–1.33)
POC LACTIC ACID: 1.52 MMOL/L (ref 0.56–1.39)
POC O2 SATURATION: 99 % (ref 94–98)
POC PCO2 TEMP: ABNORMAL MM HG
POC PCO2 TEMP: ABNORMAL MM HG
POC PCO2: 54.1 MM HG (ref 35–48)
POC PH TEMP: ABNORMAL
POC PH TEMP: ABNORMAL
POC PH: 7.46 (ref 7.35–7.45)
POC PO2 TEMP: ABNORMAL MM HG
POC PO2 TEMP: ABNORMAL MM HG
POC PO2: 154.8 MM HG (ref 83–108)
POC POTASSIUM: 3.3 MMOL/L (ref 3.5–4.5)
POC SODIUM: 139 MMOL/L (ref 138–146)
POSITIVE BASE EXCESS, ART: 13 (ref 0–3)
POSITIVE BASE EXCESS, VEN: 14 (ref 0–3)
POTASSIUM SERPL-SCNC: 3.6 MMOL/L (ref 3.7–5.3)
POTASSIUM SERPL-SCNC: 3.8 MMOL/L (ref 3.7–5.3)
PRO-BNP: 53 PG/ML
PRO-BNP: 57 PG/ML
RBC # BLD: 3.18 M/UL (ref 3.95–5.11)
RBC # BLD: 3.21 M/UL (ref 3.95–5.11)
RBC # BLD: ABNORMAL 10*6/UL
RBC # BLD: ABNORMAL 10*6/UL
SAMPLE SITE: ABNORMAL
SAMPLE SITE: ABNORMAL
SEG NEUTROPHILS: 72 % (ref 36–65)
SEG NEUTROPHILS: 74 % (ref 36–65)
SEGMENTED NEUTROPHILS ABSOLUTE COUNT: 7.04 K/UL (ref 1.5–8.1)
SEGMENTED NEUTROPHILS ABSOLUTE COUNT: 7.86 K/UL (ref 1.5–8.1)
SODIUM BLD-SCNC: 137 MMOL/L (ref 135–144)
SODIUM BLD-SCNC: 141 MMOL/L (ref 135–144)
TCO2 (CALC), ART: 40 MMOL/L (ref 22–29)
TOTAL CO2, VENOUS: 44 MMOL/L (ref 23–30)
TROPONIN INTERP: NORMAL
TROPONIN T: NORMAL NG/ML
TROPONIN, HIGH SENSITIVITY: <6 NG/L (ref 0–14)
VITAMIN B-12: 597 PG/ML (ref 232–1245)
WBC # BLD: 10.6 K/UL (ref 3.5–11.3)
WBC # BLD: 9.8 K/UL (ref 3.5–11.3)
WBC # BLD: ABNORMAL 10*3/UL
WBC # BLD: ABNORMAL 10*3/UL

## 2019-02-22 PROCEDURE — 2580000003 HC RX 258: Performed by: EMERGENCY MEDICINE

## 2019-02-22 PROCEDURE — 96366 THER/PROPH/DIAG IV INF ADDON: CPT

## 2019-02-22 PROCEDURE — 96365 THER/PROPH/DIAG IV INF INIT: CPT

## 2019-02-22 PROCEDURE — 83880 ASSAY OF NATRIURETIC PEPTIDE: CPT

## 2019-02-22 PROCEDURE — 84295 ASSAY OF SERUM SODIUM: CPT

## 2019-02-22 PROCEDURE — 83605 ASSAY OF LACTIC ACID: CPT

## 2019-02-22 PROCEDURE — 6370000000 HC RX 637 (ALT 250 FOR IP): Performed by: EMERGENCY MEDICINE

## 2019-02-22 PROCEDURE — 73030 X-RAY EXAM OF SHOULDER: CPT

## 2019-02-22 PROCEDURE — 80048 BASIC METABOLIC PNL TOTAL CA: CPT

## 2019-02-22 PROCEDURE — 82330 ASSAY OF CALCIUM: CPT

## 2019-02-22 PROCEDURE — 96368 THER/DIAG CONCURRENT INF: CPT

## 2019-02-22 PROCEDURE — 96375 TX/PRO/DX INJ NEW DRUG ADDON: CPT

## 2019-02-22 PROCEDURE — 2060000000 HC ICU INTERMEDIATE R&B

## 2019-02-22 PROCEDURE — 99285 EMERGENCY DEPT VISIT HI MDM: CPT

## 2019-02-22 PROCEDURE — 94660 CPAP INITIATION&MGMT: CPT

## 2019-02-22 PROCEDURE — 85025 COMPLETE CBC W/AUTO DIFF WBC: CPT

## 2019-02-22 PROCEDURE — 83735 ASSAY OF MAGNESIUM: CPT

## 2019-02-22 PROCEDURE — 2700000000 HC OXYGEN THERAPY PER DAY

## 2019-02-22 PROCEDURE — 6360000002 HC RX W HCPCS: Performed by: EMERGENCY MEDICINE

## 2019-02-22 PROCEDURE — 82803 BLOOD GASES ANY COMBINATION: CPT

## 2019-02-22 PROCEDURE — 82435 ASSAY OF BLOOD CHLORIDE: CPT

## 2019-02-22 PROCEDURE — 83036 HEMOGLOBIN GLYCOSYLATED A1C: CPT

## 2019-02-22 PROCEDURE — 93005 ELECTROCARDIOGRAM TRACING: CPT

## 2019-02-22 PROCEDURE — 84703 CHORIONIC GONADOTROPIN ASSAY: CPT

## 2019-02-22 PROCEDURE — 73562 X-RAY EXAM OF KNEE 3: CPT

## 2019-02-22 PROCEDURE — 82565 ASSAY OF CREATININE: CPT

## 2019-02-22 PROCEDURE — 82607 VITAMIN B-12: CPT

## 2019-02-22 PROCEDURE — 84100 ASSAY OF PHOSPHORUS: CPT

## 2019-02-22 PROCEDURE — 85014 HEMATOCRIT: CPT

## 2019-02-22 PROCEDURE — 84484 ASSAY OF TROPONIN QUANT: CPT

## 2019-02-22 PROCEDURE — 99211 OFF/OP EST MAY X REQ PHY/QHP: CPT | Performed by: NURSE PRACTITIONER

## 2019-02-22 PROCEDURE — 82947 ASSAY GLUCOSE BLOOD QUANT: CPT

## 2019-02-22 PROCEDURE — 84132 ASSAY OF SERUM POTASSIUM: CPT

## 2019-02-22 PROCEDURE — 71045 X-RAY EXAM CHEST 1 VIEW: CPT

## 2019-02-22 RX ORDER — ONDANSETRON 2 MG/ML
4 INJECTION INTRAMUSCULAR; INTRAVENOUS EVERY 6 HOURS PRN
Status: DISCONTINUED | OUTPATIENT
Start: 2019-02-22 | End: 2019-02-27 | Stop reason: HOSPADM

## 2019-02-22 RX ORDER — ALBUTEROL SULFATE 2.5 MG/3ML
2.5 SOLUTION RESPIRATORY (INHALATION) EVERY 6 HOURS PRN
Status: DISCONTINUED | OUTPATIENT
Start: 2019-02-22 | End: 2019-02-27 | Stop reason: HOSPADM

## 2019-02-22 RX ORDER — PANTOPRAZOLE SODIUM 40 MG/1
40 GRANULE, DELAYED RELEASE ORAL
COMMUNITY

## 2019-02-22 RX ORDER — FENTANYL CITRATE 50 UG/ML
50 INJECTION, SOLUTION INTRAMUSCULAR; INTRAVENOUS ONCE
Status: COMPLETED | OUTPATIENT
Start: 2019-02-22 | End: 2019-02-22

## 2019-02-22 RX ORDER — FUROSEMIDE 20 MG/1
20 TABLET ORAL DAILY
COMMUNITY
End: 2019-06-24

## 2019-02-22 RX ORDER — ATORVASTATIN CALCIUM 40 MG/1
40 TABLET, FILM COATED ORAL NIGHTLY
Status: DISCONTINUED | OUTPATIENT
Start: 2019-02-22 | End: 2019-02-27 | Stop reason: HOSPADM

## 2019-02-22 RX ORDER — DIAZEPAM 5 MG/1
2.5 TABLET ORAL 3 TIMES DAILY PRN
Status: DISCONTINUED | OUTPATIENT
Start: 2019-02-22 | End: 2019-02-27 | Stop reason: HOSPADM

## 2019-02-22 RX ORDER — FLUTICASONE PROPIONATE 50 MCG
1 SPRAY, SUSPENSION (ML) NASAL DAILY
Status: DISCONTINUED | OUTPATIENT
Start: 2019-02-23 | End: 2019-02-27 | Stop reason: HOSPADM

## 2019-02-22 RX ORDER — POTASSIUM CHLORIDE 20MEQ/15ML
40 LIQUID (ML) ORAL PRN
Status: DISCONTINUED | OUTPATIENT
Start: 2019-02-22 | End: 2019-02-27 | Stop reason: HOSPADM

## 2019-02-22 RX ORDER — MAGNESIUM SULFATE 1 G/100ML
1 INJECTION INTRAVENOUS PRN
Status: DISCONTINUED | OUTPATIENT
Start: 2019-02-22 | End: 2019-02-27 | Stop reason: HOSPADM

## 2019-02-22 RX ORDER — FUROSEMIDE 10 MG/ML
40 INJECTION INTRAMUSCULAR; INTRAVENOUS ONCE
Status: COMPLETED | OUTPATIENT
Start: 2019-02-22 | End: 2019-02-22

## 2019-02-22 RX ORDER — NITROGLYCERIN 0.4 MG/1
0.4 TABLET SUBLINGUAL ONCE
Status: COMPLETED | OUTPATIENT
Start: 2019-02-22 | End: 2019-02-22

## 2019-02-22 RX ORDER — GABAPENTIN 600 MG/1
300 TABLET ORAL 3 TIMES DAILY
Status: CANCELLED | OUTPATIENT
Start: 2019-02-22

## 2019-02-22 RX ORDER — BUDESONIDE AND FORMOTEROL FUMARATE DIHYDRATE 160; 4.5 UG/1; UG/1
2 AEROSOL RESPIRATORY (INHALATION) 2 TIMES DAILY
Status: DISCONTINUED | OUTPATIENT
Start: 2019-02-22 | End: 2019-02-22

## 2019-02-22 RX ORDER — POTASSIUM CHLORIDE 20 MEQ/1
60 TABLET, EXTENDED RELEASE ORAL ONCE
Status: COMPLETED | OUTPATIENT
Start: 2019-02-22 | End: 2019-02-22

## 2019-02-22 RX ORDER — AMLODIPINE BESYLATE 10 MG/1
10 TABLET ORAL DAILY
Status: DISCONTINUED | OUTPATIENT
Start: 2019-02-23 | End: 2019-02-23

## 2019-02-22 RX ORDER — SODIUM CHLORIDE 0.9 % (FLUSH) 0.9 %
10 SYRINGE (ML) INJECTION EVERY 12 HOURS SCHEDULED
Status: DISCONTINUED | OUTPATIENT
Start: 2019-02-22 | End: 2019-02-27 | Stop reason: HOSPADM

## 2019-02-22 RX ORDER — FUROSEMIDE 10 MG/ML
20 INJECTION INTRAMUSCULAR; INTRAVENOUS 2 TIMES DAILY
Status: DISCONTINUED | OUTPATIENT
Start: 2019-02-23 | End: 2019-02-24

## 2019-02-22 RX ORDER — FAMOTIDINE 20 MG/1
20 TABLET, FILM COATED ORAL 2 TIMES DAILY
Status: DISCONTINUED | OUTPATIENT
Start: 2019-02-22 | End: 2019-02-23

## 2019-02-22 RX ORDER — POTASSIUM CHLORIDE 20 MEQ/1
40 TABLET, EXTENDED RELEASE ORAL PRN
Status: DISCONTINUED | OUTPATIENT
Start: 2019-02-22 | End: 2019-02-27 | Stop reason: HOSPADM

## 2019-02-22 RX ORDER — OXYCODONE AND ACETAMINOPHEN 10; 325 MG/1; MG/1
1 TABLET ORAL EVERY 6 HOURS PRN
Status: ON HOLD | COMMUNITY
End: 2019-02-26

## 2019-02-22 RX ORDER — MAGNESIUM SULFATE 1 G/100ML
1 INJECTION INTRAVENOUS
Status: COMPLETED | OUTPATIENT
Start: 2019-02-22 | End: 2019-02-22

## 2019-02-22 RX ORDER — POTASSIUM CHLORIDE 7.45 MG/ML
10 INJECTION INTRAVENOUS PRN
Status: DISCONTINUED | OUTPATIENT
Start: 2019-02-22 | End: 2019-02-27 | Stop reason: HOSPADM

## 2019-02-22 RX ORDER — SODIUM CHLORIDE 0.9 % (FLUSH) 0.9 %
10 SYRINGE (ML) INJECTION PRN
Status: DISCONTINUED | OUTPATIENT
Start: 2019-02-22 | End: 2019-02-27 | Stop reason: HOSPADM

## 2019-02-22 RX ADMIN — FENTANYL CITRATE 50 MCG: 50 INJECTION, SOLUTION INTRAMUSCULAR; INTRAVENOUS at 17:45

## 2019-02-22 RX ADMIN — MAGNESIUM SULFATE HEPTAHYDRATE 1 G: 1 INJECTION, SOLUTION INTRAVENOUS at 17:43

## 2019-02-22 RX ADMIN — NITROGLYCERIN 0.4 MG: 0.4 TABLET, ORALLY DISINTEGRATING SUBLINGUAL at 17:28

## 2019-02-22 RX ADMIN — MAGNESIUM SULFATE HEPTAHYDRATE 1 G: 1 INJECTION, SOLUTION INTRAVENOUS at 18:38

## 2019-02-22 RX ADMIN — FUROSEMIDE 40 MG: 10 INJECTION, SOLUTION INTRAMUSCULAR; INTRAVENOUS at 16:05

## 2019-02-22 RX ADMIN — POTASSIUM CHLORIDE 60 MEQ: 20 TABLET, EXTENDED RELEASE ORAL at 17:28

## 2019-02-22 RX ADMIN — CALCIUM GLUCONATE 2 G: 98 INJECTION, SOLUTION INTRAVENOUS at 17:30

## 2019-02-22 RX ADMIN — FUROSEMIDE 40 MG: 10 INJECTION, SOLUTION INTRAMUSCULAR; INTRAVENOUS at 17:34

## 2019-02-22 ASSESSMENT — PAIN SCALES - GENERAL
PAINLEVEL_OUTOF10: 5
PAINLEVEL_OUTOF10: 7
PAINLEVEL_OUTOF10: 8
PAINLEVEL_OUTOF10: 7

## 2019-02-22 ASSESSMENT — PAIN DESCRIPTION - ORIENTATION
ORIENTATION: RIGHT
ORIENTATION_2: RIGHT
ORIENTATION: MID

## 2019-02-22 ASSESSMENT — PAIN DESCRIPTION - LOCATION
LOCATION: CHEST
LOCATION_3: CHEST
LOCATION_2: KNEE
LOCATION: HAND

## 2019-02-22 ASSESSMENT — ENCOUNTER SYMPTOMS
WHEEZING: 0
CHOKING: 0
ABDOMINAL PAIN: 0
SHORTNESS OF BREATH: 1
VOMITING: 0
DIARRHEA: 0
NAUSEA: 0
EYES NEGATIVE: 1
COUGH: 1
CONSTIPATION: 0

## 2019-02-22 ASSESSMENT — PAIN DESCRIPTION - PAIN TYPE
TYPE: ACUTE PAIN
TYPE_2: CHRONIC PAIN
TYPE_3: ACUTE PAIN
TYPE: CHRONIC PAIN

## 2019-02-22 ASSESSMENT — PAIN DESCRIPTION - DESCRIPTORS: DESCRIPTORS_3: ACHING

## 2019-02-22 ASSESSMENT — PAIN DESCRIPTION - DIRECTION: RADIATING_TOWARDS: BOTH SIDES

## 2019-02-22 ASSESSMENT — PAIN DESCRIPTION - FREQUENCY: FREQUENCY: CONTINUOUS

## 2019-02-22 ASSESSMENT — PAIN DESCRIPTION - INTENSITY
RATING_2: 8
RATING_3: 6

## 2019-02-23 ENCOUNTER — APPOINTMENT (OUTPATIENT)
Dept: GENERAL RADIOLOGY | Age: 44
DRG: 194 | End: 2019-02-23
Payer: MEDICARE

## 2019-02-23 LAB
-: ABNORMAL
ABSOLUTE EOS #: 0.18 K/UL (ref 0–0.44)
ABSOLUTE IMMATURE GRANULOCYTE: 0.05 K/UL (ref 0–0.3)
ABSOLUTE LYMPH #: 1.87 K/UL (ref 1.1–3.7)
ABSOLUTE MONO #: 0.71 K/UL (ref 0.1–1.2)
AMORPHOUS: ABNORMAL
ANION GAP SERPL CALCULATED.3IONS-SCNC: 10 MMOL/L (ref 9–17)
BACTERIA: ABNORMAL
BASOPHILS # BLD: 0 % (ref 0–2)
BASOPHILS ABSOLUTE: <0.03 K/UL (ref 0–0.2)
BILIRUBIN URINE: NEGATIVE
BUN BLDV-MCNC: 14 MG/DL (ref 6–20)
BUN/CREAT BLD: ABNORMAL (ref 9–20)
CALCIUM SERPL-MCNC: 8.6 MG/DL (ref 8.6–10.4)
CASTS UA: ABNORMAL /LPF (ref 0–8)
CHLORIDE BLD-SCNC: 93 MMOL/L (ref 98–107)
CO2: 39 MMOL/L (ref 20–31)
COLOR: ABNORMAL
COMMENT UA: ABNORMAL
CREAT SERPL-MCNC: 0.9 MG/DL (ref 0.5–0.9)
CRYSTALS, UA: ABNORMAL /HPF
DIFFERENTIAL TYPE: ABNORMAL
EOSINOPHILS RELATIVE PERCENT: 2 % (ref 1–4)
EPITHELIAL CELLS UA: ABNORMAL /HPF (ref 0–5)
GFR AFRICAN AMERICAN: >60 ML/MIN
GFR NON-AFRICAN AMERICAN: >60 ML/MIN
GFR SERPL CREATININE-BSD FRML MDRD: ABNORMAL ML/MIN/{1.73_M2}
GFR SERPL CREATININE-BSD FRML MDRD: ABNORMAL ML/MIN/{1.73_M2}
GLUCOSE BLD-MCNC: 151 MG/DL (ref 70–99)
GLUCOSE URINE: NEGATIVE
HCT VFR BLD CALC: 26.6 % (ref 36.3–47.1)
HEMOGLOBIN: 8.1 G/DL (ref 11.9–15.1)
IMMATURE GRANULOCYTES: 1 %
KETONES, URINE: NEGATIVE
LEUKOCYTE ESTERASE, URINE: ABNORMAL
LYMPHOCYTES # BLD: 19 % (ref 24–43)
MAGNESIUM: 1.8 MG/DL (ref 1.6–2.6)
MCH RBC QN AUTO: 28.3 PG (ref 25.2–33.5)
MCHC RBC AUTO-ENTMCNC: 30.5 G/DL (ref 28.4–34.8)
MCV RBC AUTO: 93 FL (ref 82.6–102.9)
MONOCYTES # BLD: 7 % (ref 3–12)
MUCUS: ABNORMAL
NITRITE, URINE: POSITIVE
NRBC AUTOMATED: 0 PER 100 WBC
OTHER OBSERVATIONS UA: ABNORMAL
PDW BLD-RTO: 13.5 % (ref 11.8–14.4)
PH UA: >9 (ref 5–8)
PLATELET # BLD: 250 K/UL (ref 138–453)
PLATELET ESTIMATE: ABNORMAL
PMV BLD AUTO: 10.3 FL (ref 8.1–13.5)
POTASSIUM SERPL-SCNC: 3.5 MMOL/L (ref 3.7–5.3)
PROTEIN UA: ABNORMAL
RBC # BLD: 2.86 M/UL (ref 3.95–5.11)
RBC # BLD: ABNORMAL 10*6/UL
RBC UA: ABNORMAL /HPF (ref 0–4)
RENAL EPITHELIAL, UA: ABNORMAL /HPF
SEG NEUTROPHILS: 71 % (ref 36–65)
SEGMENTED NEUTROPHILS ABSOLUTE COUNT: 7.24 K/UL (ref 1.5–8.1)
SODIUM BLD-SCNC: 142 MMOL/L (ref 135–144)
SPECIFIC GRAVITY UA: 1.01 (ref 1–1.03)
TRICHOMONAS: ABNORMAL
TURBIDITY: ABNORMAL
URINE HGB: ABNORMAL
UROBILINOGEN, URINE: NORMAL
WBC # BLD: 10.1 K/UL (ref 3.5–11.3)
WBC # BLD: ABNORMAL 10*3/UL
WBC UA: ABNORMAL /HPF (ref 0–5)
YEAST: ABNORMAL

## 2019-02-23 PROCEDURE — 2580000003 HC RX 258: Performed by: STUDENT IN AN ORGANIZED HEALTH CARE EDUCATION/TRAINING PROGRAM

## 2019-02-23 PROCEDURE — 82550 ASSAY OF CK (CPK): CPT

## 2019-02-23 PROCEDURE — 36415 COLL VENOUS BLD VENIPUNCTURE: CPT

## 2019-02-23 PROCEDURE — 6370000000 HC RX 637 (ALT 250 FOR IP): Performed by: STUDENT IN AN ORGANIZED HEALTH CARE EDUCATION/TRAINING PROGRAM

## 2019-02-23 PROCEDURE — 80048 BASIC METABOLIC PNL TOTAL CA: CPT

## 2019-02-23 PROCEDURE — 36600 WITHDRAWAL OF ARTERIAL BLOOD: CPT

## 2019-02-23 PROCEDURE — 2700000000 HC OXYGEN THERAPY PER DAY

## 2019-02-23 PROCEDURE — 94762 N-INVAS EAR/PLS OXIMTRY CONT: CPT

## 2019-02-23 PROCEDURE — 87088 URINE BACTERIA CULTURE: CPT

## 2019-02-23 PROCEDURE — 97163 PT EVAL HIGH COMPLEX 45 MIN: CPT

## 2019-02-23 PROCEDURE — 83735 ASSAY OF MAGNESIUM: CPT

## 2019-02-23 PROCEDURE — 94640 AIRWAY INHALATION TREATMENT: CPT

## 2019-02-23 PROCEDURE — 87086 URINE CULTURE/COLONY COUNT: CPT

## 2019-02-23 PROCEDURE — 6360000002 HC RX W HCPCS: Performed by: STUDENT IN AN ORGANIZED HEALTH CARE EDUCATION/TRAINING PROGRAM

## 2019-02-23 PROCEDURE — 71045 X-RAY EXAM CHEST 1 VIEW: CPT

## 2019-02-23 PROCEDURE — 81001 URINALYSIS AUTO W/SCOPE: CPT

## 2019-02-23 PROCEDURE — 97530 THERAPEUTIC ACTIVITIES: CPT

## 2019-02-23 PROCEDURE — 85025 COMPLETE CBC W/AUTO DIFF WBC: CPT

## 2019-02-23 PROCEDURE — 2060000000 HC ICU INTERMEDIATE R&B

## 2019-02-23 PROCEDURE — 83874 ASSAY OF MYOGLOBIN: CPT

## 2019-02-23 PROCEDURE — 84443 ASSAY THYROID STIM HORMONE: CPT

## 2019-02-23 PROCEDURE — 99223 1ST HOSP IP/OBS HIGH 75: CPT | Performed by: INTERNAL MEDICINE

## 2019-02-23 RX ORDER — ISOSORBIDE DINITRATE 20 MG/1
20 TABLET ORAL 3 TIMES DAILY
COMMUNITY
End: 2019-10-28 | Stop reason: SDUPTHER

## 2019-02-23 RX ORDER — SPIRONOLACTONE 25 MG/1
25 TABLET ORAL DAILY
Status: DISCONTINUED | OUTPATIENT
Start: 2019-02-23 | End: 2019-02-27 | Stop reason: HOSPADM

## 2019-02-23 RX ORDER — ISOSORBIDE DINITRATE 10 MG/1
20 TABLET ORAL 3 TIMES DAILY
Status: DISCONTINUED | OUTPATIENT
Start: 2019-02-23 | End: 2019-02-27 | Stop reason: HOSPADM

## 2019-02-23 RX ORDER — PANTOPRAZOLE SODIUM 40 MG/1
40 TABLET, DELAYED RELEASE ORAL
Status: DISCONTINUED | OUTPATIENT
Start: 2019-02-23 | End: 2019-02-27 | Stop reason: HOSPADM

## 2019-02-23 RX ORDER — PANTOPRAZOLE SODIUM 40 MG/1
40 TABLET, DELAYED RELEASE ORAL
Status: DISCONTINUED | OUTPATIENT
Start: 2019-02-24 | End: 2019-02-23

## 2019-02-23 RX ORDER — OXYCODONE AND ACETAMINOPHEN 10; 325 MG/1; MG/1
1 TABLET ORAL EVERY 8 HOURS PRN
Status: DISCONTINUED | OUTPATIENT
Start: 2019-02-23 | End: 2019-02-23

## 2019-02-23 RX ORDER — LORATADINE 10 MG/1
10 TABLET ORAL DAILY
Status: DISCONTINUED | OUTPATIENT
Start: 2019-02-23 | End: 2019-02-27 | Stop reason: HOSPADM

## 2019-02-23 RX ORDER — OXYCODONE HYDROCHLORIDE 5 MG/1
5 TABLET ORAL EVERY 8 HOURS PRN
Status: DISCONTINUED | OUTPATIENT
Start: 2019-02-23 | End: 2019-02-27 | Stop reason: HOSPADM

## 2019-02-23 RX ORDER — GABAPENTIN 600 MG/1
600 TABLET ORAL 3 TIMES DAILY
Status: DISCONTINUED | OUTPATIENT
Start: 2019-02-23 | End: 2019-02-27 | Stop reason: HOSPADM

## 2019-02-23 RX ORDER — HYDRALAZINE HYDROCHLORIDE 25 MG/1
25 TABLET, FILM COATED ORAL EVERY 8 HOURS SCHEDULED
Status: DISCONTINUED | OUTPATIENT
Start: 2019-02-23 | End: 2019-02-27 | Stop reason: HOSPADM

## 2019-02-23 RX ORDER — OXYCODONE HYDROCHLORIDE AND ACETAMINOPHEN 5; 325 MG/1; MG/1
1 TABLET ORAL EVERY 8 HOURS PRN
Status: DISCONTINUED | OUTPATIENT
Start: 2019-02-23 | End: 2019-02-27 | Stop reason: HOSPADM

## 2019-02-23 RX ORDER — SPIRONOLACTONE 25 MG/1
25 TABLET ORAL DAILY
COMMUNITY
End: 2019-10-28 | Stop reason: SDUPTHER

## 2019-02-23 RX ADMIN — SERTRALINE 100 MG: 50 TABLET, FILM COATED ORAL at 08:45

## 2019-02-23 RX ADMIN — FLUTICASONE PROPIONATE 1 SPRAY: 50 SPRAY, METERED NASAL at 08:45

## 2019-02-23 RX ADMIN — DESMOPRESSIN ACETATE 40 MG: 0.2 TABLET ORAL at 00:47

## 2019-02-23 RX ADMIN — PANTOPRAZOLE SODIUM 40 MG: 40 TABLET, DELAYED RELEASE ORAL at 13:57

## 2019-02-23 RX ADMIN — OXYCODONE HYDROCHLORIDE 5 MG: 5 TABLET ORAL at 16:21

## 2019-02-23 RX ADMIN — ISOSORBIDE DINITRATE 20 MG: 10 TABLET ORAL at 21:44

## 2019-02-23 RX ADMIN — OXYCODONE HYDROCHLORIDE 5 MG: 5 TABLET ORAL at 01:37

## 2019-02-23 RX ADMIN — ENOXAPARIN SODIUM 40 MG: 40 INJECTION SUBCUTANEOUS at 08:45

## 2019-02-23 RX ADMIN — DIAZEPAM 2.5 MG: 5 TABLET ORAL at 00:47

## 2019-02-23 RX ADMIN — Medication 10 ML: at 00:47

## 2019-02-23 RX ADMIN — ISOSORBIDE DINITRATE 20 MG: 10 TABLET ORAL at 13:57

## 2019-02-23 RX ADMIN — FUROSEMIDE 20 MG: 10 INJECTION, SOLUTION INTRAMUSCULAR; INTRAVENOUS at 08:45

## 2019-02-23 RX ADMIN — OXYCODONE HYDROCHLORIDE AND ACETAMINOPHEN 1 TABLET: 5; 325 TABLET ORAL at 08:41

## 2019-02-23 RX ADMIN — SPIRONOLACTONE 25 MG: 25 TABLET ORAL at 13:57

## 2019-02-23 RX ADMIN — Medication 10 ML: at 08:46

## 2019-02-23 RX ADMIN — HYDRALAZINE HYDROCHLORIDE 25 MG: 25 TABLET, FILM COATED ORAL at 21:44

## 2019-02-23 RX ADMIN — OXYCODONE HYDROCHLORIDE AND ACETAMINOPHEN 1 TABLET: 5; 325 TABLET ORAL at 01:38

## 2019-02-23 RX ADMIN — OXYCODONE HYDROCHLORIDE AND ACETAMINOPHEN 1 TABLET: 5; 325 TABLET ORAL at 16:20

## 2019-02-23 RX ADMIN — OXYCODONE HYDROCHLORIDE 5 MG: 5 TABLET ORAL at 08:41

## 2019-02-23 RX ADMIN — TIOTROPIUM BROMIDE 18 MCG: 18 CAPSULE ORAL; RESPIRATORY (INHALATION) at 08:05

## 2019-02-23 RX ADMIN — GABAPENTIN 600 MG: 600 TABLET ORAL at 21:44

## 2019-02-23 RX ADMIN — HYDRALAZINE HYDROCHLORIDE 25 MG: 25 TABLET, FILM COATED ORAL at 13:58

## 2019-02-23 RX ADMIN — FUROSEMIDE 20 MG: 10 INJECTION, SOLUTION INTRAMUSCULAR; INTRAVENOUS at 17:46

## 2019-02-23 RX ADMIN — MOMETASONE FUROATE AND FORMOTEROL FUMARATE DIHYDRATE 2 PUFF: 200; 5 AEROSOL RESPIRATORY (INHALATION) at 08:05

## 2019-02-23 RX ADMIN — MOMETASONE FUROATE AND FORMOTEROL FUMARATE DIHYDRATE 2 PUFF: 200; 5 AEROSOL RESPIRATORY (INHALATION) at 19:36

## 2019-02-23 RX ADMIN — DESMOPRESSIN ACETATE 40 MG: 0.2 TABLET ORAL at 21:44

## 2019-02-23 RX ADMIN — DIAZEPAM 2.5 MG: 5 TABLET ORAL at 17:45

## 2019-02-23 RX ADMIN — POTASSIUM CHLORIDE 40 MEQ: 1500 TABLET, EXTENDED RELEASE ORAL at 07:08

## 2019-02-23 RX ADMIN — Medication 10 ML: at 21:45

## 2019-02-23 RX ADMIN — HYDRALAZINE HYDROCHLORIDE 25 MG: 50 TABLET, FILM COATED ORAL at 08:45

## 2019-02-23 ASSESSMENT — PAIN SCALES - GENERAL
PAINLEVEL_OUTOF10: 8
PAINLEVEL_OUTOF10: 9
PAINLEVEL_OUTOF10: 6

## 2019-02-24 LAB
-: ABNORMAL
ABSOLUTE EOS #: 0.23 K/UL (ref 0–0.44)
ABSOLUTE IMMATURE GRANULOCYTE: 0.19 K/UL (ref 0–0.3)
ABSOLUTE LYMPH #: 2.23 K/UL (ref 1.1–3.7)
ABSOLUTE MONO #: 0.77 K/UL (ref 0.1–1.2)
AMORPHOUS: ABNORMAL
ANION GAP SERPL CALCULATED.3IONS-SCNC: 13 MMOL/L (ref 9–17)
BACTERIA: ABNORMAL
BASOPHILS # BLD: 0 % (ref 0–2)
BASOPHILS ABSOLUTE: 0.04 K/UL (ref 0–0.2)
BILIRUBIN URINE: NEGATIVE
BUN BLDV-MCNC: 17 MG/DL (ref 6–20)
BUN/CREAT BLD: ABNORMAL (ref 9–20)
CALCIUM SERPL-MCNC: 8.7 MG/DL (ref 8.6–10.4)
CASTS UA: ABNORMAL /LPF (ref 0–8)
CHLORIDE BLD-SCNC: 94 MMOL/L (ref 98–107)
CO2: 32 MMOL/L (ref 20–31)
COLOR: ABNORMAL
COMMENT UA: ABNORMAL
CREAT SERPL-MCNC: 0.92 MG/DL (ref 0.5–0.9)
CRYSTALS, UA: ABNORMAL /HPF
CULTURE: ABNORMAL
DIFFERENTIAL TYPE: ABNORMAL
EOSINOPHILS RELATIVE PERCENT: 3 % (ref 1–4)
EPITHELIAL CELLS UA: ABNORMAL /HPF (ref 0–5)
ESTIMATED AVERAGE GLUCOSE: 134 MG/DL
GFR AFRICAN AMERICAN: >60 ML/MIN
GFR NON-AFRICAN AMERICAN: >60 ML/MIN
GFR SERPL CREATININE-BSD FRML MDRD: ABNORMAL ML/MIN/{1.73_M2}
GFR SERPL CREATININE-BSD FRML MDRD: ABNORMAL ML/MIN/{1.73_M2}
GLUCOSE BLD-MCNC: 182 MG/DL (ref 70–99)
GLUCOSE URINE: NEGATIVE
HBA1C MFR BLD: 6.3 % (ref 4–6)
HCT VFR BLD CALC: 28 % (ref 36.3–47.1)
HEMOGLOBIN: 8.6 G/DL (ref 11.9–15.1)
IMMATURE GRANULOCYTES: 2 %
KETONES, URINE: NEGATIVE
LEUKOCYTE ESTERASE, URINE: ABNORMAL
LYMPHOCYTES # BLD: 25 % (ref 24–43)
Lab: ABNORMAL
MCH RBC QN AUTO: 27.9 PG (ref 25.2–33.5)
MCHC RBC AUTO-ENTMCNC: 30.7 G/DL (ref 28.4–34.8)
MCV RBC AUTO: 90.9 FL (ref 82.6–102.9)
MONOCYTES # BLD: 9 % (ref 3–12)
MUCUS: ABNORMAL
MYOGLOBIN: 39 NG/ML (ref 25–58)
NITRITE, URINE: NEGATIVE
NRBC AUTOMATED: 0 PER 100 WBC
OTHER OBSERVATIONS UA: ABNORMAL
PDW BLD-RTO: 13.6 % (ref 11.8–14.4)
PH UA: >9 (ref 5–8)
PLATELET # BLD: 203 K/UL (ref 138–453)
PLATELET ESTIMATE: ABNORMAL
PMV BLD AUTO: 10.3 FL (ref 8.1–13.5)
POTASSIUM SERPL-SCNC: 4.2 MMOL/L (ref 3.7–5.3)
PROTEIN UA: ABNORMAL
RBC # BLD: 3.08 M/UL (ref 3.95–5.11)
RBC # BLD: ABNORMAL 10*6/UL
RBC UA: ABNORMAL /HPF (ref 0–4)
RENAL EPITHELIAL, UA: ABNORMAL /HPF
SEG NEUTROPHILS: 61 % (ref 36–65)
SEGMENTED NEUTROPHILS ABSOLUTE COUNT: 5.47 K/UL (ref 1.5–8.1)
SODIUM BLD-SCNC: 139 MMOL/L (ref 135–144)
SPECIFIC GRAVITY UA: 1.01 (ref 1–1.03)
SPECIMEN DESCRIPTION: ABNORMAL
TOTAL CK: 94 U/L (ref 26–192)
TRICHOMONAS: ABNORMAL
TSH SERPL DL<=0.05 MIU/L-ACNC: 2.36 MIU/L (ref 0.3–5)
TURBIDITY: ABNORMAL
URINE HGB: ABNORMAL
UROBILINOGEN, URINE: NORMAL
WBC # BLD: 8.9 K/UL (ref 3.5–11.3)
WBC # BLD: ABNORMAL 10*3/UL
WBC UA: ABNORMAL /HPF (ref 0–5)
YEAST: ABNORMAL

## 2019-02-24 PROCEDURE — 2580000003 HC RX 258: Performed by: STUDENT IN AN ORGANIZED HEALTH CARE EDUCATION/TRAINING PROGRAM

## 2019-02-24 PROCEDURE — 6360000002 HC RX W HCPCS: Performed by: INTERNAL MEDICINE

## 2019-02-24 PROCEDURE — 36415 COLL VENOUS BLD VENIPUNCTURE: CPT

## 2019-02-24 PROCEDURE — 6370000000 HC RX 637 (ALT 250 FOR IP): Performed by: STUDENT IN AN ORGANIZED HEALTH CARE EDUCATION/TRAINING PROGRAM

## 2019-02-24 PROCEDURE — 87086 URINE CULTURE/COLONY COUNT: CPT

## 2019-02-24 PROCEDURE — 2700000000 HC OXYGEN THERAPY PER DAY

## 2019-02-24 PROCEDURE — 2580000003 HC RX 258: Performed by: INTERNAL MEDICINE

## 2019-02-24 PROCEDURE — 94640 AIRWAY INHALATION TREATMENT: CPT

## 2019-02-24 PROCEDURE — 99232 SBSQ HOSP IP/OBS MODERATE 35: CPT | Performed by: INTERNAL MEDICINE

## 2019-02-24 PROCEDURE — 94760 N-INVAS EAR/PLS OXIMETRY 1: CPT

## 2019-02-24 PROCEDURE — 6360000002 HC RX W HCPCS: Performed by: STUDENT IN AN ORGANIZED HEALTH CARE EDUCATION/TRAINING PROGRAM

## 2019-02-24 PROCEDURE — 85025 COMPLETE CBC W/AUTO DIFF WBC: CPT

## 2019-02-24 PROCEDURE — 80048 BASIC METABOLIC PNL TOTAL CA: CPT

## 2019-02-24 PROCEDURE — 81001 URINALYSIS AUTO W/SCOPE: CPT

## 2019-02-24 PROCEDURE — 2060000000 HC ICU INTERMEDIATE R&B

## 2019-02-24 PROCEDURE — 94660 CPAP INITIATION&MGMT: CPT

## 2019-02-24 RX ORDER — FUROSEMIDE 10 MG/ML
40 INJECTION INTRAMUSCULAR; INTRAVENOUS 2 TIMES DAILY
Status: COMPLETED | OUTPATIENT
Start: 2019-02-24 | End: 2019-02-25

## 2019-02-24 RX ADMIN — LORATADINE 10 MG: 10 TABLET ORAL at 00:21

## 2019-02-24 RX ADMIN — HYDRALAZINE HYDROCHLORIDE 25 MG: 25 TABLET, FILM COATED ORAL at 06:31

## 2019-02-24 RX ADMIN — HYDRALAZINE HYDROCHLORIDE 25 MG: 25 TABLET, FILM COATED ORAL at 13:59

## 2019-02-24 RX ADMIN — PANTOPRAZOLE SODIUM 40 MG: 40 TABLET, DELAYED RELEASE ORAL at 06:31

## 2019-02-24 RX ADMIN — GABAPENTIN 600 MG: 600 TABLET ORAL at 21:47

## 2019-02-24 RX ADMIN — OXYCODONE HYDROCHLORIDE AND ACETAMINOPHEN 1 TABLET: 5; 325 TABLET ORAL at 00:20

## 2019-02-24 RX ADMIN — ISOSORBIDE DINITRATE 20 MG: 10 TABLET ORAL at 13:59

## 2019-02-24 RX ADMIN — ENOXAPARIN SODIUM 40 MG: 40 INJECTION SUBCUTANEOUS at 08:31

## 2019-02-24 RX ADMIN — SPIRONOLACTONE 25 MG: 25 TABLET ORAL at 08:32

## 2019-02-24 RX ADMIN — GABAPENTIN 600 MG: 600 TABLET ORAL at 08:32

## 2019-02-24 RX ADMIN — FUROSEMIDE 40 MG: 10 INJECTION, SOLUTION INTRAMUSCULAR; INTRAVENOUS at 17:04

## 2019-02-24 RX ADMIN — CEFTRIAXONE SODIUM 1 G: 1 INJECTION, POWDER, FOR SOLUTION INTRAMUSCULAR; INTRAVENOUS at 01:08

## 2019-02-24 RX ADMIN — Medication 10 ML: at 21:47

## 2019-02-24 RX ADMIN — ISOSORBIDE DINITRATE 20 MG: 10 TABLET ORAL at 21:47

## 2019-02-24 RX ADMIN — TIOTROPIUM BROMIDE 18 MCG: 18 CAPSULE ORAL; RESPIRATORY (INHALATION) at 08:22

## 2019-02-24 RX ADMIN — MOMETASONE FUROATE AND FORMOTEROL FUMARATE DIHYDRATE 2 PUFF: 200; 5 AEROSOL RESPIRATORY (INHALATION) at 23:34

## 2019-02-24 RX ADMIN — FLUTICASONE PROPIONATE 1 SPRAY: 50 SPRAY, METERED NASAL at 08:32

## 2019-02-24 RX ADMIN — DESMOPRESSIN ACETATE 40 MG: 0.2 TABLET ORAL at 21:47

## 2019-02-24 RX ADMIN — OXYCODONE HYDROCHLORIDE 5 MG: 5 TABLET ORAL at 00:20

## 2019-02-24 RX ADMIN — MOMETASONE FUROATE AND FORMOTEROL FUMARATE DIHYDRATE 2 PUFF: 200; 5 AEROSOL RESPIRATORY (INHALATION) at 08:24

## 2019-02-24 RX ADMIN — OXYCODONE HYDROCHLORIDE 5 MG: 5 TABLET ORAL at 17:02

## 2019-02-24 RX ADMIN — OXYCODONE HYDROCHLORIDE AND ACETAMINOPHEN 1 TABLET: 5; 325 TABLET ORAL at 08:32

## 2019-02-24 RX ADMIN — OXYCODONE HYDROCHLORIDE 5 MG: 5 TABLET ORAL at 08:32

## 2019-02-24 RX ADMIN — GABAPENTIN 600 MG: 600 TABLET ORAL at 13:59

## 2019-02-24 RX ADMIN — FUROSEMIDE 20 MG: 10 INJECTION, SOLUTION INTRAMUSCULAR; INTRAVENOUS at 08:31

## 2019-02-24 RX ADMIN — OXYCODONE HYDROCHLORIDE AND ACETAMINOPHEN 1 TABLET: 5; 325 TABLET ORAL at 17:02

## 2019-02-24 RX ADMIN — ISOSORBIDE DINITRATE 20 MG: 10 TABLET ORAL at 08:32

## 2019-02-24 RX ADMIN — HYDRALAZINE HYDROCHLORIDE 25 MG: 25 TABLET, FILM COATED ORAL at 21:47

## 2019-02-24 RX ADMIN — Medication 10 ML: at 08:33

## 2019-02-24 RX ADMIN — SERTRALINE 100 MG: 50 TABLET, FILM COATED ORAL at 08:32

## 2019-02-24 RX ADMIN — LORATADINE 10 MG: 10 TABLET ORAL at 08:32

## 2019-02-24 ASSESSMENT — PAIN SCALES - GENERAL
PAINLEVEL_OUTOF10: 8
PAINLEVEL_OUTOF10: 7
PAINLEVEL_OUTOF10: 6

## 2019-02-25 LAB
-: NORMAL
ABSOLUTE EOS #: 0.37 K/UL (ref 0–0.44)
ABSOLUTE IMMATURE GRANULOCYTE: 0.08 K/UL (ref 0–0.3)
ABSOLUTE LYMPH #: 1.91 K/UL (ref 1.1–3.7)
ABSOLUTE MONO #: 0.82 K/UL (ref 0.1–1.2)
AMORPHOUS: NORMAL
ANION GAP SERPL CALCULATED.3IONS-SCNC: 11 MMOL/L (ref 9–17)
BACTERIA: NORMAL
BASOPHILS # BLD: 0 % (ref 0–2)
BASOPHILS ABSOLUTE: 0.03 K/UL (ref 0–0.2)
BILIRUBIN URINE: NEGATIVE
BUN BLDV-MCNC: 21 MG/DL (ref 6–20)
BUN/CREAT BLD: ABNORMAL (ref 9–20)
CALCIUM SERPL-MCNC: 8.9 MG/DL (ref 8.6–10.4)
CASTS UA: NORMAL /LPF (ref 0–8)
CHLORIDE BLD-SCNC: 95 MMOL/L (ref 98–107)
CO2: 33 MMOL/L (ref 20–31)
COLOR: YELLOW
COMMENT UA: ABNORMAL
CREAT SERPL-MCNC: 0.96 MG/DL (ref 0.5–0.9)
CRYSTALS, UA: NORMAL /HPF
CULTURE: ABNORMAL
DIFFERENTIAL TYPE: ABNORMAL
EKG ATRIAL RATE: 84 BPM
EKG P AXIS: 32 DEGREES
EKG P-R INTERVAL: 128 MS
EKG Q-T INTERVAL: 404 MS
EKG QRS DURATION: 82 MS
EKG QTC CALCULATION (BAZETT): 477 MS
EKG R AXIS: 14 DEGREES
EKG T AXIS: 10 DEGREES
EKG VENTRICULAR RATE: 84 BPM
EOSINOPHILS RELATIVE PERCENT: 4 % (ref 1–4)
EPITHELIAL CELLS UA: NORMAL /HPF (ref 0–5)
GFR AFRICAN AMERICAN: >60 ML/MIN
GFR NON-AFRICAN AMERICAN: >60 ML/MIN
GFR SERPL CREATININE-BSD FRML MDRD: ABNORMAL ML/MIN/{1.73_M2}
GFR SERPL CREATININE-BSD FRML MDRD: ABNORMAL ML/MIN/{1.73_M2}
GLUCOSE BLD-MCNC: 139 MG/DL (ref 70–99)
GLUCOSE URINE: NEGATIVE
HCT VFR BLD CALC: 27.9 % (ref 36.3–47.1)
HEMOGLOBIN: 8.3 G/DL (ref 11.9–15.1)
IMMATURE GRANULOCYTES: 1 %
KETONES, URINE: NEGATIVE
LEUKOCYTE ESTERASE, URINE: NEGATIVE
LV EF: 75 %
LVEF MODALITY: NORMAL
LYMPHOCYTES # BLD: 21 % (ref 24–43)
Lab: ABNORMAL
MCH RBC QN AUTO: 28.1 PG (ref 25.2–33.5)
MCHC RBC AUTO-ENTMCNC: 29.7 G/DL (ref 28.4–34.8)
MCV RBC AUTO: 94.6 FL (ref 82.6–102.9)
MONOCYTES # BLD: 9 % (ref 3–12)
MUCUS: NORMAL
NITRITE, URINE: NEGATIVE
NRBC AUTOMATED: 0 PER 100 WBC
OTHER OBSERVATIONS UA: NORMAL
PDW BLD-RTO: 13.3 % (ref 11.8–14.4)
PH UA: 8 (ref 5–8)
PLATELET # BLD: 262 K/UL (ref 138–453)
PLATELET ESTIMATE: ABNORMAL
PMV BLD AUTO: 10 FL (ref 8.1–13.5)
POTASSIUM SERPL-SCNC: 3.9 MMOL/L (ref 3.7–5.3)
PROTEIN UA: NEGATIVE
RBC # BLD: 2.95 M/UL (ref 3.95–5.11)
RBC # BLD: ABNORMAL 10*6/UL
RBC UA: NORMAL /HPF (ref 0–4)
RENAL EPITHELIAL, UA: NORMAL /HPF
SEG NEUTROPHILS: 65 % (ref 36–65)
SEGMENTED NEUTROPHILS ABSOLUTE COUNT: 5.82 K/UL (ref 1.5–8.1)
SODIUM BLD-SCNC: 139 MMOL/L (ref 135–144)
SPECIFIC GRAVITY UA: 1.01 (ref 1–1.03)
SPECIMEN DESCRIPTION: ABNORMAL
TRICHOMONAS: NORMAL
TURBIDITY: CLEAR
URINE HGB: ABNORMAL
UROBILINOGEN, URINE: NORMAL
WBC # BLD: 9 K/UL (ref 3.5–11.3)
WBC # BLD: ABNORMAL 10*3/UL
WBC UA: NORMAL /HPF (ref 0–5)
YEAST: NORMAL

## 2019-02-25 PROCEDURE — 97530 THERAPEUTIC ACTIVITIES: CPT

## 2019-02-25 PROCEDURE — 6370000000 HC RX 637 (ALT 250 FOR IP): Performed by: STUDENT IN AN ORGANIZED HEALTH CARE EDUCATION/TRAINING PROGRAM

## 2019-02-25 PROCEDURE — 85025 COMPLETE CBC W/AUTO DIFF WBC: CPT

## 2019-02-25 PROCEDURE — 94761 N-INVAS EAR/PLS OXIMETRY MLT: CPT

## 2019-02-25 PROCEDURE — 97166 OT EVAL MOD COMPLEX 45 MIN: CPT

## 2019-02-25 PROCEDURE — 6360000002 HC RX W HCPCS: Performed by: STUDENT IN AN ORGANIZED HEALTH CARE EDUCATION/TRAINING PROGRAM

## 2019-02-25 PROCEDURE — 2700000000 HC OXYGEN THERAPY PER DAY

## 2019-02-25 PROCEDURE — 76937 US GUIDE VASCULAR ACCESS: CPT

## 2019-02-25 PROCEDURE — 94640 AIRWAY INHALATION TREATMENT: CPT

## 2019-02-25 PROCEDURE — 36415 COLL VENOUS BLD VENIPUNCTURE: CPT

## 2019-02-25 PROCEDURE — 81001 URINALYSIS AUTO W/SCOPE: CPT

## 2019-02-25 PROCEDURE — 80048 BASIC METABOLIC PNL TOTAL CA: CPT

## 2019-02-25 PROCEDURE — 6360000002 HC RX W HCPCS: Performed by: INTERNAL MEDICINE

## 2019-02-25 PROCEDURE — 2060000000 HC ICU INTERMEDIATE R&B

## 2019-02-25 PROCEDURE — 2580000003 HC RX 258: Performed by: STUDENT IN AN ORGANIZED HEALTH CARE EDUCATION/TRAINING PROGRAM

## 2019-02-25 PROCEDURE — 97110 THERAPEUTIC EXERCISES: CPT

## 2019-02-25 PROCEDURE — 93306 TTE W/DOPPLER COMPLETE: CPT

## 2019-02-25 PROCEDURE — 99233 SBSQ HOSP IP/OBS HIGH 50: CPT | Performed by: INTERNAL MEDICINE

## 2019-02-25 RX ORDER — METOLAZONE 2.5 MG/1
2.5 TABLET ORAL DAILY
COMMUNITY
End: 2019-07-25 | Stop reason: DRUGHIGH

## 2019-02-25 RX ORDER — FUROSEMIDE 40 MG/1
40 TABLET ORAL 2 TIMES DAILY
Status: DISCONTINUED | OUTPATIENT
Start: 2019-02-26 | End: 2019-02-27 | Stop reason: HOSPADM

## 2019-02-25 RX ORDER — TORSEMIDE 20 MG/1
20 TABLET ORAL DAILY
Status: ON HOLD | COMMUNITY
End: 2019-02-26 | Stop reason: HOSPADM

## 2019-02-25 RX ORDER — METOLAZONE 2.5 MG/1
2.5 TABLET ORAL DAILY
Status: DISCONTINUED | OUTPATIENT
Start: 2019-02-26 | End: 2019-02-27 | Stop reason: HOSPADM

## 2019-02-25 RX ADMIN — ISOSORBIDE DINITRATE 20 MG: 10 TABLET ORAL at 08:56

## 2019-02-25 RX ADMIN — DESMOPRESSIN ACETATE 40 MG: 0.2 TABLET ORAL at 23:02

## 2019-02-25 RX ADMIN — MOMETASONE FUROATE AND FORMOTEROL FUMARATE DIHYDRATE 2 PUFF: 200; 5 AEROSOL RESPIRATORY (INHALATION) at 20:10

## 2019-02-25 RX ADMIN — OXYCODONE HYDROCHLORIDE AND ACETAMINOPHEN 1 TABLET: 5; 325 TABLET ORAL at 15:16

## 2019-02-25 RX ADMIN — OXYCODONE HYDROCHLORIDE AND ACETAMINOPHEN 1 TABLET: 5; 325 TABLET ORAL at 23:01

## 2019-02-25 RX ADMIN — FUROSEMIDE 40 MG: 10 INJECTION, SOLUTION INTRAMUSCULAR; INTRAVENOUS at 18:51

## 2019-02-25 RX ADMIN — FLUTICASONE PROPIONATE 1 SPRAY: 50 SPRAY, METERED NASAL at 08:56

## 2019-02-25 RX ADMIN — HYDRALAZINE HYDROCHLORIDE 25 MG: 25 TABLET, FILM COATED ORAL at 06:19

## 2019-02-25 RX ADMIN — GABAPENTIN 600 MG: 600 TABLET ORAL at 08:56

## 2019-02-25 RX ADMIN — OXYCODONE HYDROCHLORIDE 5 MG: 5 TABLET ORAL at 23:01

## 2019-02-25 RX ADMIN — OXYCODONE HYDROCHLORIDE 5 MG: 5 TABLET ORAL at 15:16

## 2019-02-25 RX ADMIN — GABAPENTIN 600 MG: 600 TABLET ORAL at 23:01

## 2019-02-25 RX ADMIN — OXYCODONE HYDROCHLORIDE 5 MG: 5 TABLET ORAL at 06:20

## 2019-02-25 RX ADMIN — MOMETASONE FUROATE AND FORMOTEROL FUMARATE DIHYDRATE 2 PUFF: 200; 5 AEROSOL RESPIRATORY (INHALATION) at 09:27

## 2019-02-25 RX ADMIN — ISOSORBIDE DINITRATE 20 MG: 10 TABLET ORAL at 23:01

## 2019-02-25 RX ADMIN — Medication 10 ML: at 08:56

## 2019-02-25 RX ADMIN — ENOXAPARIN SODIUM 40 MG: 40 INJECTION SUBCUTANEOUS at 22:34

## 2019-02-25 RX ADMIN — TIOTROPIUM BROMIDE 18 MCG: 18 CAPSULE ORAL; RESPIRATORY (INHALATION) at 09:27

## 2019-02-25 RX ADMIN — GABAPENTIN 600 MG: 600 TABLET ORAL at 15:16

## 2019-02-25 RX ADMIN — HYDRALAZINE HYDROCHLORIDE 25 MG: 25 TABLET, FILM COATED ORAL at 15:16

## 2019-02-25 RX ADMIN — OXYCODONE HYDROCHLORIDE AND ACETAMINOPHEN 1 TABLET: 5; 325 TABLET ORAL at 06:21

## 2019-02-25 RX ADMIN — ISOSORBIDE DINITRATE 20 MG: 10 TABLET ORAL at 15:16

## 2019-02-25 RX ADMIN — ONDANSETRON 4 MG: 2 INJECTION INTRAMUSCULAR; INTRAVENOUS at 22:31

## 2019-02-25 RX ADMIN — FUROSEMIDE 40 MG: 10 INJECTION, SOLUTION INTRAMUSCULAR; INTRAVENOUS at 08:56

## 2019-02-25 RX ADMIN — SERTRALINE 100 MG: 50 TABLET, FILM COATED ORAL at 08:56

## 2019-02-25 RX ADMIN — SPIRONOLACTONE 25 MG: 25 TABLET ORAL at 08:56

## 2019-02-25 RX ADMIN — LORATADINE 10 MG: 10 TABLET ORAL at 08:56

## 2019-02-25 RX ADMIN — ENOXAPARIN SODIUM 40 MG: 40 INJECTION SUBCUTANEOUS at 08:56

## 2019-02-25 RX ADMIN — HYDRALAZINE HYDROCHLORIDE 25 MG: 25 TABLET, FILM COATED ORAL at 23:02

## 2019-02-25 RX ADMIN — PANTOPRAZOLE SODIUM 40 MG: 40 TABLET, DELAYED RELEASE ORAL at 06:19

## 2019-02-25 ASSESSMENT — ENCOUNTER SYMPTOMS
WHEEZING: 0
COUGH: 0
SHORTNESS OF BREATH: 1
ABDOMINAL PAIN: 0
VOMITING: 0
EYES NEGATIVE: 1
NAUSEA: 0

## 2019-02-25 ASSESSMENT — PAIN SCALES - GENERAL
PAINLEVEL_OUTOF10: 6
PAINLEVEL_OUTOF10: 7
PAINLEVEL_OUTOF10: 8
PAINLEVEL_OUTOF10: 8
PAINLEVEL_OUTOF10: 6

## 2019-02-26 VITALS
DIASTOLIC BLOOD PRESSURE: 65 MMHG | RESPIRATION RATE: 17 BRPM | WEIGHT: 293 LBS | HEIGHT: 60 IN | SYSTOLIC BLOOD PRESSURE: 143 MMHG | TEMPERATURE: 98.2 F | OXYGEN SATURATION: 100 % | HEART RATE: 68 BPM | BODY MASS INDEX: 57.52 KG/M2

## 2019-02-26 PROCEDURE — 6360000002 HC RX W HCPCS: Performed by: STUDENT IN AN ORGANIZED HEALTH CARE EDUCATION/TRAINING PROGRAM

## 2019-02-26 PROCEDURE — 99232 SBSQ HOSP IP/OBS MODERATE 35: CPT | Performed by: INTERNAL MEDICINE

## 2019-02-26 PROCEDURE — 6370000000 HC RX 637 (ALT 250 FOR IP): Performed by: STUDENT IN AN ORGANIZED HEALTH CARE EDUCATION/TRAINING PROGRAM

## 2019-02-26 PROCEDURE — 2580000003 HC RX 258: Performed by: STUDENT IN AN ORGANIZED HEALTH CARE EDUCATION/TRAINING PROGRAM

## 2019-02-26 PROCEDURE — 2700000000 HC OXYGEN THERAPY PER DAY

## 2019-02-26 PROCEDURE — 94660 CPAP INITIATION&MGMT: CPT

## 2019-02-26 PROCEDURE — 97110 THERAPEUTIC EXERCISES: CPT

## 2019-02-26 PROCEDURE — 97530 THERAPEUTIC ACTIVITIES: CPT

## 2019-02-26 PROCEDURE — 94640 AIRWAY INHALATION TREATMENT: CPT

## 2019-02-26 RX ORDER — FUROSEMIDE 40 MG/1
40 TABLET ORAL 2 TIMES DAILY
Qty: 60 TABLET | Refills: 3 | Status: ON HOLD | OUTPATIENT
Start: 2019-02-26 | End: 2019-05-08 | Stop reason: HOSPADM

## 2019-02-26 RX ORDER — OXYCODONE AND ACETAMINOPHEN 10; 325 MG/1; MG/1
1 TABLET ORAL EVERY 6 HOURS PRN
Qty: 10 TABLET | Refills: 0 | Status: SHIPPED | OUTPATIENT
Start: 2019-02-26 | End: 2019-03-01

## 2019-02-26 RX ADMIN — MOMETASONE FUROATE AND FORMOTEROL FUMARATE DIHYDRATE 2 PUFF: 200; 5 AEROSOL RESPIRATORY (INHALATION) at 08:49

## 2019-02-26 RX ADMIN — HYDRALAZINE HYDROCHLORIDE 25 MG: 25 TABLET, FILM COATED ORAL at 06:39

## 2019-02-26 RX ADMIN — FUROSEMIDE 40 MG: 40 TABLET ORAL at 08:12

## 2019-02-26 RX ADMIN — HYDRALAZINE HYDROCHLORIDE 25 MG: 25 TABLET, FILM COATED ORAL at 13:51

## 2019-02-26 RX ADMIN — OXYCODONE HYDROCHLORIDE AND ACETAMINOPHEN 1 TABLET: 5; 325 TABLET ORAL at 16:05

## 2019-02-26 RX ADMIN — METOLAZONE 2.5 MG: 2.5 TABLET ORAL at 08:15

## 2019-02-26 RX ADMIN — FUROSEMIDE 40 MG: 40 TABLET ORAL at 16:06

## 2019-02-26 RX ADMIN — LORATADINE 10 MG: 10 TABLET ORAL at 08:12

## 2019-02-26 RX ADMIN — GABAPENTIN 600 MG: 600 TABLET ORAL at 19:29

## 2019-02-26 RX ADMIN — OXYCODONE HYDROCHLORIDE AND ACETAMINOPHEN 1 TABLET: 5; 325 TABLET ORAL at 07:38

## 2019-02-26 RX ADMIN — TIOTROPIUM BROMIDE 18 MCG: 18 CAPSULE ORAL; RESPIRATORY (INHALATION) at 08:49

## 2019-02-26 RX ADMIN — ISOSORBIDE DINITRATE 20 MG: 10 TABLET ORAL at 13:51

## 2019-02-26 RX ADMIN — DESMOPRESSIN ACETATE 40 MG: 0.2 TABLET ORAL at 19:30

## 2019-02-26 RX ADMIN — SPIRONOLACTONE 25 MG: 25 TABLET ORAL at 08:12

## 2019-02-26 RX ADMIN — SERTRALINE 100 MG: 50 TABLET, FILM COATED ORAL at 08:11

## 2019-02-26 RX ADMIN — PANTOPRAZOLE SODIUM 40 MG: 40 TABLET, DELAYED RELEASE ORAL at 06:39

## 2019-02-26 RX ADMIN — MOMETASONE FUROATE AND FORMOTEROL FUMARATE DIHYDRATE 2 PUFF: 200; 5 AEROSOL RESPIRATORY (INHALATION) at 20:56

## 2019-02-26 RX ADMIN — ENOXAPARIN SODIUM 40 MG: 40 INJECTION SUBCUTANEOUS at 19:30

## 2019-02-26 RX ADMIN — OXYCODONE HYDROCHLORIDE 5 MG: 5 TABLET ORAL at 07:38

## 2019-02-26 RX ADMIN — GABAPENTIN 600 MG: 600 TABLET ORAL at 13:51

## 2019-02-26 RX ADMIN — FLUTICASONE PROPIONATE 1 SPRAY: 50 SPRAY, METERED NASAL at 09:00

## 2019-02-26 RX ADMIN — ISOSORBIDE DINITRATE 20 MG: 10 TABLET ORAL at 08:12

## 2019-02-26 RX ADMIN — GABAPENTIN 600 MG: 600 TABLET ORAL at 08:12

## 2019-02-26 RX ADMIN — ENOXAPARIN SODIUM 40 MG: 40 INJECTION SUBCUTANEOUS at 08:11

## 2019-02-26 RX ADMIN — ISOSORBIDE DINITRATE 20 MG: 10 TABLET ORAL at 19:30

## 2019-02-26 RX ADMIN — Medication 10 ML: at 08:15

## 2019-02-26 ASSESSMENT — PAIN SCALES - GENERAL
PAINLEVEL_OUTOF10: 8
PAINLEVEL_OUTOF10: 5
PAINLEVEL_OUTOF10: 8

## 2019-02-26 ASSESSMENT — ENCOUNTER SYMPTOMS
VOMITING: 0
EYES NEGATIVE: 1
COUGH: 0
WHEEZING: 0
SHORTNESS OF BREATH: 1
ABDOMINAL PAIN: 0
NAUSEA: 0

## 2019-02-26 ASSESSMENT — PAIN DESCRIPTION - FREQUENCY: FREQUENCY: INTERMITTENT

## 2019-02-26 ASSESSMENT — PAIN DESCRIPTION - ORIENTATION: ORIENTATION: RIGHT

## 2019-02-26 ASSESSMENT — PAIN DESCRIPTION - PAIN TYPE: TYPE: CHRONIC PAIN

## 2019-02-26 ASSESSMENT — PAIN DESCRIPTION - LOCATION: LOCATION: KNEE;SHOULDER

## 2019-04-08 ENCOUNTER — HOSPITAL ENCOUNTER (OUTPATIENT)
Dept: SLEEP CENTER | Age: 44
Discharge: HOME | End: 2019-04-10
Payer: MEDICARE

## 2019-04-08 VITALS — HEIGHT: 60 IN | WEIGHT: 293 LBS | BODY MASS INDEX: 57.52 KG/M2

## 2019-04-08 DIAGNOSIS — G47.33 OSA (OBSTRUCTIVE SLEEP APNEA): ICD-10-CM

## 2019-04-08 PROCEDURE — 95810 POLYSOM 6/> YRS 4/> PARAM: CPT

## 2019-04-15 LAB — STATUS: NORMAL

## 2019-04-22 ENCOUNTER — HOSPITAL ENCOUNTER (OUTPATIENT)
Dept: SLEEP CENTER | Age: 44
Discharge: HOME | End: 2019-04-24
Payer: MEDICARE

## 2019-04-22 DIAGNOSIS — G47.33 OSA (OBSTRUCTIVE SLEEP APNEA): ICD-10-CM

## 2019-04-22 PROCEDURE — 95811 POLYSOM 6/>YRS CPAP 4/> PARM: CPT

## 2019-04-22 PROCEDURE — 95811 POLYSOM 6/>YRS CPAP 4/> PARM: CPT | Performed by: PSYCHIATRY & NEUROLOGY

## 2019-04-29 ENCOUNTER — OFFICE VISIT (OUTPATIENT)
Dept: PULMONOLOGY | Age: 44
End: 2019-04-29
Payer: MEDICARE

## 2019-04-29 VITALS
RESPIRATION RATE: 18 BRPM | DIASTOLIC BLOOD PRESSURE: 80 MMHG | OXYGEN SATURATION: 98 % | BODY MASS INDEX: 57.52 KG/M2 | HEART RATE: 69 BPM | HEIGHT: 60 IN | WEIGHT: 293 LBS | SYSTOLIC BLOOD PRESSURE: 139 MMHG

## 2019-04-29 DIAGNOSIS — J96.10 CHRONIC RESPIRATORY FAILURE, UNSPECIFIED WHETHER WITH HYPOXIA OR HYPERCAPNIA (HCC): ICD-10-CM

## 2019-04-29 DIAGNOSIS — G47.33 OSA ON CPAP: Primary | ICD-10-CM

## 2019-04-29 DIAGNOSIS — Z99.89 OSA ON CPAP: Primary | ICD-10-CM

## 2019-04-29 DIAGNOSIS — I27.20 PULMONARY HYPERTENSION (HCC): ICD-10-CM

## 2019-04-29 DIAGNOSIS — I10 ESSENTIAL HYPERTENSION: ICD-10-CM

## 2019-04-29 DIAGNOSIS — I27.81 CHRONIC COR PULMONALE (HCC): ICD-10-CM

## 2019-04-29 DIAGNOSIS — J42 CHRONIC BRONCHITIS, UNSPECIFIED CHRONIC BRONCHITIS TYPE (HCC): ICD-10-CM

## 2019-04-29 DIAGNOSIS — Z99.81 ON HOME O2: ICD-10-CM

## 2019-04-29 DIAGNOSIS — E66.01 MORBID OBESITY (HCC): ICD-10-CM

## 2019-04-29 DIAGNOSIS — E66.2 OBESITY HYPOVENTILATION SYNDROME (HCC): ICD-10-CM

## 2019-04-29 PROCEDURE — 3023F SPIROM DOC REV: CPT | Performed by: INTERNAL MEDICINE

## 2019-04-29 PROCEDURE — G8926 SPIRO NO PERF OR DOC: HCPCS | Performed by: INTERNAL MEDICINE

## 2019-04-29 PROCEDURE — G8417 CALC BMI ABV UP PARAM F/U: HCPCS | Performed by: INTERNAL MEDICINE

## 2019-04-29 PROCEDURE — G8599 NO ASA/ANTIPLAT THER USE RNG: HCPCS | Performed by: INTERNAL MEDICINE

## 2019-04-29 PROCEDURE — G8427 DOCREV CUR MEDS BY ELIG CLIN: HCPCS | Performed by: INTERNAL MEDICINE

## 2019-04-29 PROCEDURE — 99214 OFFICE O/P EST MOD 30 MIN: CPT | Performed by: INTERNAL MEDICINE

## 2019-04-29 PROCEDURE — 1036F TOBACCO NON-USER: CPT | Performed by: INTERNAL MEDICINE

## 2019-04-29 RX ORDER — TRAZODONE HYDROCHLORIDE 50 MG/1
50 TABLET ORAL NIGHTLY
Qty: 30 TABLET | Refills: 0 | Status: SHIPPED | OUTPATIENT
Start: 2019-04-29 | End: 2019-05-04 | Stop reason: SDUPTHER

## 2019-04-29 RX ORDER — CHOLECALCIFEROL (VITAMIN D3) 125 MCG
CAPSULE ORAL
Refills: 5 | COMMUNITY
Start: 2019-04-15 | End: 2019-10-28 | Stop reason: SDUPTHER

## 2019-04-29 NOTE — PROGRESS NOTES
3. QUIT SMOKING 15 YEARS OR GREATER   []      4. RECENT CT WITH IN 11 MONTHS    []      5. LIFE EXPECTANCY < 5 YEARS   []      6. SIGNS  AND SYMPTOMS OF LUNG CANCER   []         Immunization     There is no immunization history on file for this patient. Pneumococcal Vaccine     [x] Up to date    [] Indicated   [] Refused  [] Contraindicated       Influenza Vaccine   [x] Up to date    [] Indicated   [] Refused  [] Contraindicated          PAST MEDICAL HISTORY:       Diagnosis Date    Acute on chronic diastolic CHF (congestive heart failure) ([facility] 9/15/2018    HIEN (acute kidney injury) ([facility] 11/20/2018    COPD (chronic obstructive pulmonary disease) ([facility]     Hypertension     Pulmonary hypertension ([facility]          Family History:   History reviewed. No pertinent family history. SURGICAL HISTORY:   Past Surgical History:   Procedure Laterality Date    GASTROSTOMY TUBE PLACEMENT  02/2018    Removed in April 2018    [facility]. CATH POWER PICC TRIPLE  2/2/2018         LA OFFICE/OUTPT VISIT,PROCEDURE ONLY N/A 2/17/2018    TRACHEOTOMY performed by Shayan Urbina MD at [address]  03/2018    TRACHEOTOMY  02/2018           SOCIAL AND OCCUPATIONAL HEALTH:      There  is no history of TB or TB exposure. There  is no asbestos or silica dust exposure. The patient reports  no coal, foundry, quarry or [employer] exposure. Travel history reveals negative    There  is no history of recreational or IV drug use. There  is no hot tube exposure. Pets  negative    Occupational history not significant    TOBACCO:   reports that she quit smoking about 15 months ago. Her smoking use included cigarettes. She started smoking about 24 years ago. She has a 22.00 pack-year smoking history. She has never used smokeless tobacco.  ETOH:   reports that she does not drink alcohol.       ALLERGIES:      Allergies   Allergen Reactions    Asa [Aspirin]     Beta Adrenergic Blockers Other (See Comments) Asystole and Bradycardia on admission  01/26/2018-2/22/2018 at [address] [place] [address]:   Prior to Admission medications    Medication Sig Start Date End Date Taking? Authorizing Provider   melatonin 5 MG TABS tablet  4/15/19  Yes Historical Provider, MD   traZODone (DESYREL) 50 MG tablet Take 1 tablet by mouth nightly 4/29/19 5/29/19 Yes Vilma Jackson MD   furosemide (LASIX) 20 MG tablet Take 20 mg by mouth daily   Yes Historical Provider, MD   budesonide-formoterol (SYMBICORT) 160-4.5 MCG/ACT AERO Inhale 2 puffs into the lungs 2 times daily   Yes Historical Provider, MD   metolazone (ZAROXOLYN) 2.5 MG tablet Take 2.5 mg by mouth daily   Yes Historical Provider, MD   pantoprazole (PROTONIX) 20 MG tablet Take 20 mg by mouth daily   Yes Historical Provider, MD   torsemide (DEMADEX) 20 MG tablet Take 1 tablet by mouth 2 times daily 11/21/18  Yes Porter Russell MD   spironolactone (ALDACTONE) 25 MG tablet Take 1 tablet by mouth daily 11/22/18  Yes Porter Russell MD   LORazepam (ATIVAN) 1 MG tablet Take 1 mg by mouth every 6 hours as needed for Anxiety. .   Yes Historical Provider, MD   oxyCODONE-acetaminophen (PERCOCET)  MG per tablet Take 1 tablet by mouth every 6 hours as needed for Pain. .   Yes Historical Provider, MD   gabapentin (NEURONTIN) 800 MG tablet Take 800 mg by mouth 3 times daily. .   Yes Historical Provider, MD   isosorbide dinitrate (ISORDIL) 20 MG tablet Take 1 tablet by mouth 3 times daily 9/20/18  Yes Flakita Shaikh MD   hydrALAZINE (APRESOLINE) 25 MG tablet Take 1 tablet by mouth every 8 hours 9/20/18  Yes Flakita Shaikh MD   Blood Pressure KIT 1 Device by Does not apply route 2 times daily 9/20/18  Yes Lola Perez MD   sertraline (ZOLOFT) 25 MG tablet Take 75 mg by mouth daily   Yes Historical Provider, MD   atorvastatin (LIPITOR) 40 MG tablet Take 1 tablet by mouth nightly 2/21/18  Yes Mary Morales MD   tiotropium (SPIRIVA) 18 MCG inhalation capsule Inhale 18 mcg into the lungs daily   Yes Historical Provider, MD   albuterol sulfate  (90 Base) MCG/ACT inhaler Inhale 2 puffs into the lungs every 6 hours as needed for Wheezing   Yes Historical Provider, MD   ferrous sulfate 325 (65 Fe) MG tablet Take 325 mg by mouth 2 times daily (with meals)    Yes Historical Provider, MD   loratadine (CLARITIN) 10 MG capsule Take 10 mg by mouth daily   Yes Historical Provider, MD   fluticasone (FLONASE) 50 MCG/ACT nasal spray 1 spray by Nasal route daily   Yes Historical Provider, MD   mometasone-formoterol (DULERA) 100-5 MCG/ACT inhaler Inhale 2 puffs into the lungs 2 times daily 9/20/18   Zo Cota MD   cloNIDine (CATAPRES) 0.1 MG tablet Take 1 tablet by mouth 3 times daily as needed (SBP>160) 2/21/18   Polina Suarez MD              REVIEW OF SYSTEMS:    CONSTITUTIONAL:  negative for  fevers, chills, sweats, fatigue, malaise, anorexia and weight loss, morbid obesity, no respiratory distress, not using accessory muscles. She is on supplemental oxygen per nasal cannula  EYES:  negative for  double vision, blurred vision, dry eyes, eye discharge and redness. No Angel's syndrome. No jaundice  HEENT:  negative for  hearing loss, tinnitus, ear drainage, earaches and nasal congestion. No nasal polyps. No sinus tenderness  RESPIRATORY:  See hpi  CARDIOVASCULAR:  negative for  chest pain,, palpitations, orthopnea, PND, bilateral pedal edema due to heart failure. JVD cannot be appreciated because of short thick neck. Has history of hypertension, recurrent heart failure due to cor pulmonale and contributed to by heart failure, hypertension  GASTROINTESTINAL:  negative for nausea, vomiting, change in bowel habits, diarrhea, constipation, abdominal pain, pruritus, abdominal mass and abdominal distention. No diarrhea.   No vomiting, no GI bleed  GENITOURINARY:  negative for frequency, dysuria, nocturia, urinary incontinence and hesitancy frequent and UTIs  INTEGUMENT  negative for rash, skin lesion(s), dryness, skin color change, changes in lesion, pruritus and changes in hair  HEMATOLOGIC/LYMPHATIC:  negative for easy bruising, bleeding, lymphadenopathy, petechiae and swelling/edema have an anemia  one time which was treated. ALLERGIC/IMMUNOLOGIC:  negative for recurrent infections, urticaria and drug reactions. No hayfever  ENDOCRINE:  negative for heat intolerance, cold intolerance, tremor, weight changes and change in bowel habits. No diabetes. No hypothyroid or hyperthyroid state  MUSCULOSKELETAL:  negative for  myalgias, arthralgias, pain, joint swelling, stiff joints and decreased range of motion no acute arthritis  NEUROLOGICAL:  negative for headaches, dizziness, seizures, memory problems, speech problems, visual disturbance and coordination problems, no neurological deficit  BEHAVIOR/PSYCH:  negative for poor appetite, increased appetite, decreased sleep, increased sleep, decreased energy level, increased energy level and poor concentration. No psychiatry problems  Skin no rash no dermatitis  Vitals:  /80   Pulse 69   Resp 18   Ht 5' (1.524 m)   Wt (!) 405 lb (183.7 kg)   SpO2 98% Comment: pt on 2.5lpm  BMI 79.10 kg/m²     PHYSICAL EXAM:  General Appearance:  , Morbid obesity, no distress, not using accessory muscle on supplemental oxygen and a wheelchair   Head:    Normocephalic, without obvious abnormality, atraumatic      Eyes:    PERRL, conjunctiva/corneas clear, EOM's intactNo jaundice. No Angel's syndrome   Ears:    Normal  external ear canals, both earsNormal ear exam   Nose:   Nares normal, septum midline, mucosa normal, no drainage        or sinus tendernessNo polyps. No sinus tenderness.   Oropharyngeal orifice is compromised due to redundant tissue   Throat:   Lips, mucosa, and tongue normal; teeth and gums normal   Neck:   Supple, symmetrical, trachea midline, no adenopathy;     thyroid:  no last 72 hours. Thyroid:   Lab Results   Component Value Date    TSH 1.93 11/17/2018     Urinalysis: No results for input(s): BACTERIA, BLOODU, CLARITYU, COLORU, PHUR, PROTEINU, RBCUA, SPECGRAV, BILIRUBINUR, NITRU, WBCUA, LEUKOCYTESUR, GLUCOSEU in the last 72 hours. Cultures:-  No recent cultures   CXR   Recent chest x-ray CT scan available to review        Echo     No recent echo          IMPRESSION:    Visit Diagnoses       Codes    STPEH on CPAP    -  Primary G47.33, Z99.89    On home O2     Z99.81    Chronic cor pulmonale (HCC)     I27.81    Essential hypertension     I10        :                PLAN:      Patient has a chronic history failure. Most hypoxic and hypercapnic. Chronic respiratory failure is secondary to COPD, obesity hypoventilation syndrome and sleep apnea syndrome. She has a chronic up pulmonology secondary to above. She could be classified as pickwickian syndrome or overlap syndrome. She is on supplemental oxygen 24 hours a day and I advised to continue the same. She has a sleep apnea syndrome that responded well to use CPAP at a final pressure of 18 cm water. I did write her a prescription for a CPAP to be set at 18 cm of water. Patient also has been complaining of marked insomnia. This could be related to her respiratory status. And also because of obesity. She was advised to take trazodone 50 mg at bedtime. I don't want to give any heavy sedation because of CO2 retention, which may cause problems. Her blood pressure is stable. She'll continue the present antihypertensive therapy. Patient was advised to use the CPAP all night and also during the daytime if she is taking a nap. The no need to give any systemic steroids or antibiotics at this time. She'll continue with inhaled steroid. Short acting beta agonist long-acting beta agonist and anticholinergics. New pressure. Continue the diuretics.     Patient already have had her Pneumovax and influenza vaccine. Patient is not a candidate for lung cancer screening. I plan to see her follow-up in few weeks. Dictated by Dr. Julio Hooker M.D. dictation over thank you      Requested Prescriptions     Signed Prescriptions Disp Refills    traZODone (DESYREL) 50 MG tablet 30 tablet 0     Sig: Take 1 tablet by mouth nightly       There are no discontinued medications. Rosio received counseling on the following healthy behaviors: nutrition, exercise and medication adherence    Patient given educational materials : see patient instruction       Discussed use, benefit, and side effects of prescribed medications. Barriers to medication compliance addressed. All patient questions answered. Pt voiced understanding. I hope this updates you on my evaluation and clinical thinking. Thank you for allowing me to participate in his care. Sincerely,      Electronically signed by Rose Stephens MD on   4/29/19 at 3:23 PM       Please note that this chart was generated using voice recognition Dragon dictation software. Although every effort was made to ensure the accuracy of this automated transcription, some errors in transcription may have occurred.

## 2019-05-06 ENCOUNTER — HOSPITAL ENCOUNTER (INPATIENT)
Age: 44
LOS: 3 days | Discharge: HOME HEALTH | DRG: 420 | End: 2019-05-09
Attending: EMERGENCY MEDICINE | Admitting: INTERNAL MEDICINE
Payer: MEDICARE

## 2019-05-06 ENCOUNTER — APPOINTMENT (OUTPATIENT)
Dept: GENERAL RADIOLOGY | Age: 44
DRG: 420 | End: 2019-05-06
Payer: MEDICARE

## 2019-05-06 DIAGNOSIS — R31.29 HEMATURIA, MICROSCOPIC: ICD-10-CM

## 2019-05-06 DIAGNOSIS — E11.9 DIABETES MELLITUS, NEW ONSET (HCC): Primary | ICD-10-CM

## 2019-05-06 PROBLEM — D64.9 NORMOCYTIC ANEMIA: Status: ACTIVE | Noted: 2019-05-06

## 2019-05-06 PROBLEM — R42 DIZZINESS: Status: ACTIVE | Noted: 2019-05-06

## 2019-05-06 PROBLEM — N17.9 AKI (ACUTE KIDNEY INJURY) (HCC): Status: ACTIVE | Noted: 2019-05-06

## 2019-05-06 LAB
-: NORMAL
ABSOLUTE EOS #: 0.16 K/UL (ref 0–0.44)
ABSOLUTE IMMATURE GRANULOCYTE: 0.07 K/UL (ref 0–0.3)
ABSOLUTE LYMPH #: 1.57 K/UL (ref 1.1–3.7)
ABSOLUTE MONO #: 0.54 K/UL (ref 0.1–1.2)
AMORPHOUS: NORMAL
AMPHETAMINE SCREEN URINE: NEGATIVE
ANION GAP SERPL CALCULATED.3IONS-SCNC: 16 MMOL/L (ref 9–17)
BACTERIA: NORMAL
BARBITURATE SCREEN URINE: NEGATIVE
BASOPHILS # BLD: 0 % (ref 0–2)
BASOPHILS ABSOLUTE: <0.03 K/UL (ref 0–0.2)
BENZODIAZEPINE SCREEN, URINE: NEGATIVE
BETA-HYDROXYBUTYRATE: 0.07 MMOL/L (ref 0.02–0.27)
BILIRUBIN URINE: NEGATIVE
BNP INTERPRETATION: NORMAL
BUN BLDV-MCNC: 24 MG/DL (ref 6–20)
BUN/CREAT BLD: ABNORMAL (ref 9–20)
BUPRENORPHINE URINE: ABNORMAL
CALCIUM SERPL-MCNC: 8.7 MG/DL (ref 8.6–10.4)
CANNABINOID SCREEN URINE: NEGATIVE
CASTS UA: NORMAL /LPF (ref 0–8)
CHLORIDE BLD-SCNC: 88 MMOL/L (ref 98–107)
CO2: 32 MMOL/L (ref 20–31)
COCAINE METABOLITE, URINE: NEGATIVE
COLOR: YELLOW
COMMENT UA: ABNORMAL
CREAT SERPL-MCNC: 1.22 MG/DL (ref 0.5–0.9)
CRYSTALS, UA: NORMAL /HPF
DIFFERENTIAL TYPE: ABNORMAL
EOSINOPHILS RELATIVE PERCENT: 2 % (ref 1–4)
EPITHELIAL CELLS UA: NORMAL /HPF (ref 0–5)
ESTIMATED AVERAGE GLUCOSE: 148 MG/DL
FERRITIN: 139 UG/L (ref 13–150)
FOLATE: 11.8 NG/ML
GFR AFRICAN AMERICAN: 58 ML/MIN
GFR NON-AFRICAN AMERICAN: 48 ML/MIN
GFR SERPL CREATININE-BSD FRML MDRD: ABNORMAL ML/MIN/{1.73_M2}
GFR SERPL CREATININE-BSD FRML MDRD: ABNORMAL ML/MIN/{1.73_M2}
GLUCOSE BLD-MCNC: 137 MG/DL (ref 65–105)
GLUCOSE BLD-MCNC: 155 MG/DL (ref 65–105)
GLUCOSE BLD-MCNC: 167 MG/DL (ref 65–105)
GLUCOSE BLD-MCNC: 254 MG/DL (ref 70–99)
GLUCOSE URINE: NEGATIVE
HBA1C MFR BLD: 6.8 % (ref 4–6)
HCT VFR BLD CALC: 32.2 % (ref 36.3–47.1)
HEMOGLOBIN: 9.4 G/DL (ref 11.9–15.1)
IMMATURE GRANULOCYTES: 1 %
IRON SATURATION: 15 % (ref 20–55)
IRON: 44 UG/DL (ref 37–145)
KETONES, URINE: NEGATIVE
LEUKOCYTE ESTERASE, URINE: NEGATIVE
LYMPHOCYTES # BLD: 18 % (ref 24–43)
MCH RBC QN AUTO: 26.9 PG (ref 25.2–33.5)
MCHC RBC AUTO-ENTMCNC: 29.2 G/DL (ref 28.4–34.8)
MCV RBC AUTO: 92 FL (ref 82.6–102.9)
MDMA URINE: ABNORMAL
METHADONE SCREEN, URINE: NEGATIVE
METHAMPHETAMINE, URINE: ABNORMAL
MONOCYTES # BLD: 6 % (ref 3–12)
MUCUS: NORMAL
NITRITE, URINE: NEGATIVE
NRBC AUTOMATED: 0 PER 100 WBC
OPIATES, URINE: NEGATIVE
OTHER OBSERVATIONS UA: NORMAL
OXYCODONE SCREEN URINE: POSITIVE
PDW BLD-RTO: 13.8 % (ref 11.8–14.4)
PH UA: 6.5 (ref 5–8)
PHENCYCLIDINE, URINE: NEGATIVE
PLATELET # BLD: 307 K/UL (ref 138–453)
PLATELET ESTIMATE: ABNORMAL
PMV BLD AUTO: 9.9 FL (ref 8.1–13.5)
POTASSIUM SERPL-SCNC: 3.4 MMOL/L (ref 3.7–5.3)
PRO-BNP: 29 PG/ML
PROPOXYPHENE, URINE: ABNORMAL
PROTEIN UA: NEGATIVE
RBC # BLD: 3.5 M/UL (ref 3.95–5.11)
RBC # BLD: ABNORMAL 10*6/UL
RBC UA: NORMAL /HPF (ref 0–4)
RENAL EPITHELIAL, UA: NORMAL /HPF
SEG NEUTROPHILS: 73 % (ref 36–65)
SEGMENTED NEUTROPHILS ABSOLUTE COUNT: 6.15 K/UL (ref 1.5–8.1)
SODIUM BLD-SCNC: 136 MMOL/L (ref 135–144)
SPECIFIC GRAVITY UA: 1.02 (ref 1–1.03)
TEST INFORMATION: ABNORMAL
TOTAL IRON BINDING CAPACITY: 288 UG/DL (ref 250–450)
TRICHOMONAS: NORMAL
TRICYCLIC ANTIDEPRESSANTS, UR: ABNORMAL
TROPONIN INTERP: NORMAL
TROPONIN T: NORMAL NG/ML
TROPONIN, HIGH SENSITIVITY: 6 NG/L (ref 0–14)
TURBIDITY: CLEAR
UNSATURATED IRON BINDING CAPACITY: 244 UG/DL (ref 112–347)
URINE HGB: ABNORMAL
UROBILINOGEN, URINE: NORMAL
VITAMIN B-12: 605 PG/ML (ref 232–1245)
WBC # BLD: 8.5 K/UL (ref 3.5–11.3)
WBC # BLD: ABNORMAL 10*3/UL
WBC UA: NORMAL /HPF (ref 0–5)
YEAST: NORMAL

## 2019-05-06 PROCEDURE — 97166 OT EVAL MOD COMPLEX 45 MIN: CPT

## 2019-05-06 PROCEDURE — 82010 KETONE BODYS QUAN: CPT

## 2019-05-06 PROCEDURE — 82746 ASSAY OF FOLIC ACID SERUM: CPT

## 2019-05-06 PROCEDURE — 82728 ASSAY OF FERRITIN: CPT

## 2019-05-06 PROCEDURE — 71045 X-RAY EXAM CHEST 1 VIEW: CPT

## 2019-05-06 PROCEDURE — 80048 BASIC METABOLIC PNL TOTAL CA: CPT

## 2019-05-06 PROCEDURE — 96375 TX/PRO/DX INJ NEW DRUG ADDON: CPT

## 2019-05-06 PROCEDURE — 6370000000 HC RX 637 (ALT 250 FOR IP): Performed by: INTERNAL MEDICINE

## 2019-05-06 PROCEDURE — 83880 ASSAY OF NATRIURETIC PEPTIDE: CPT

## 2019-05-06 PROCEDURE — 85025 COMPLETE CBC W/AUTO DIFF WBC: CPT

## 2019-05-06 PROCEDURE — 94640 AIRWAY INHALATION TREATMENT: CPT

## 2019-05-06 PROCEDURE — 96374 THER/PROPH/DIAG INJ IV PUSH: CPT

## 2019-05-06 PROCEDURE — 82607 VITAMIN B-12: CPT

## 2019-05-06 PROCEDURE — 83550 IRON BINDING TEST: CPT

## 2019-05-06 PROCEDURE — 2580000003 HC RX 258: Performed by: INTERNAL MEDICINE

## 2019-05-06 PROCEDURE — 83036 HEMOGLOBIN GLYCOSYLATED A1C: CPT

## 2019-05-06 PROCEDURE — 2580000003 HC RX 258: Performed by: NURSE PRACTITIONER

## 2019-05-06 PROCEDURE — 99285 EMERGENCY DEPT VISIT HI MDM: CPT

## 2019-05-06 PROCEDURE — 93005 ELECTROCARDIOGRAM TRACING: CPT

## 2019-05-06 PROCEDURE — 81001 URINALYSIS AUTO W/SCOPE: CPT

## 2019-05-06 PROCEDURE — 99223 1ST HOSP IP/OBS HIGH 75: CPT | Performed by: INTERNAL MEDICINE

## 2019-05-06 PROCEDURE — 2700000000 HC OXYGEN THERAPY PER DAY

## 2019-05-06 PROCEDURE — 6360000002 HC RX W HCPCS: Performed by: NURSE PRACTITIONER

## 2019-05-06 PROCEDURE — 80307 DRUG TEST PRSMV CHEM ANLYZR: CPT

## 2019-05-06 PROCEDURE — 6370000000 HC RX 637 (ALT 250 FOR IP): Performed by: STUDENT IN AN ORGANIZED HEALTH CARE EDUCATION/TRAINING PROGRAM

## 2019-05-06 PROCEDURE — 82947 ASSAY GLUCOSE BLOOD QUANT: CPT

## 2019-05-06 PROCEDURE — 6360000002 HC RX W HCPCS: Performed by: INTERNAL MEDICINE

## 2019-05-06 PROCEDURE — 97535 SELF CARE MNGMENT TRAINING: CPT

## 2019-05-06 PROCEDURE — 84484 ASSAY OF TROPONIN QUANT: CPT

## 2019-05-06 PROCEDURE — 1200000000 HC SEMI PRIVATE

## 2019-05-06 PROCEDURE — 83540 ASSAY OF IRON: CPT

## 2019-05-06 RX ORDER — OXYCODONE AND ACETAMINOPHEN 10; 325 MG/1; MG/1
1 TABLET ORAL EVERY 6 HOURS PRN
COMMUNITY
End: 2019-10-28 | Stop reason: SDUPTHER

## 2019-05-06 RX ORDER — IPRATROPIUM BROMIDE AND ALBUTEROL SULFATE 2.5; .5 MG/3ML; MG/3ML
3 SOLUTION RESPIRATORY (INHALATION) EVERY 4 HOURS PRN
Status: DISCONTINUED | OUTPATIENT
Start: 2019-05-06 | End: 2019-05-10 | Stop reason: HOSPADM

## 2019-05-06 RX ORDER — ISOSORBIDE DINITRATE 10 MG/1
20 TABLET ORAL 3 TIMES DAILY
Status: DISCONTINUED | OUTPATIENT
Start: 2019-05-06 | End: 2019-05-10 | Stop reason: HOSPADM

## 2019-05-06 RX ORDER — GABAPENTIN 800 MG/1
800 TABLET ORAL 3 TIMES DAILY
Status: DISCONTINUED | OUTPATIENT
Start: 2019-05-06 | End: 2019-05-10 | Stop reason: HOSPADM

## 2019-05-06 RX ORDER — PANTOPRAZOLE SODIUM 40 MG/1
40 GRANULE, DELAYED RELEASE ORAL
Status: DISCONTINUED | OUTPATIENT
Start: 2019-05-07 | End: 2019-05-06 | Stop reason: SDUPTHER

## 2019-05-06 RX ORDER — METOCLOPRAMIDE HYDROCHLORIDE 5 MG/ML
10 INJECTION INTRAMUSCULAR; INTRAVENOUS ONCE
Status: COMPLETED | OUTPATIENT
Start: 2019-05-06 | End: 2019-05-06

## 2019-05-06 RX ORDER — DIPHENHYDRAMINE HYDROCHLORIDE 50 MG/ML
25 INJECTION INTRAMUSCULAR; INTRAVENOUS ONCE
Status: COMPLETED | OUTPATIENT
Start: 2019-05-06 | End: 2019-05-06

## 2019-05-06 RX ORDER — ATORVASTATIN CALCIUM 40 MG/1
40 TABLET, FILM COATED ORAL NIGHTLY
Status: DISCONTINUED | OUTPATIENT
Start: 2019-05-06 | End: 2019-05-10 | Stop reason: HOSPADM

## 2019-05-06 RX ORDER — FLUTICASONE PROPIONATE 50 MCG
1 SPRAY, SUSPENSION (ML) NASAL DAILY
Status: DISCONTINUED | OUTPATIENT
Start: 2019-05-06 | End: 2019-05-10 | Stop reason: HOSPADM

## 2019-05-06 RX ORDER — LORATADINE 10 MG/1
10 TABLET ORAL DAILY
Status: DISCONTINUED | OUTPATIENT
Start: 2019-05-06 | End: 2019-05-06 | Stop reason: SDUPTHER

## 2019-05-06 RX ORDER — TRAZODONE HYDROCHLORIDE 50 MG/1
TABLET ORAL
Qty: 30 TABLET | Refills: 0 | Status: SHIPPED | OUTPATIENT
Start: 2019-05-06 | End: 2019-05-31 | Stop reason: SDUPTHER

## 2019-05-06 RX ORDER — ACETAMINOPHEN 325 MG/1
650 TABLET ORAL EVERY 4 HOURS PRN
Status: DISCONTINUED | OUTPATIENT
Start: 2019-05-06 | End: 2019-05-10 | Stop reason: HOSPADM

## 2019-05-06 RX ORDER — NICOTINE POLACRILEX 4 MG
15 LOZENGE BUCCAL PRN
Status: DISCONTINUED | OUTPATIENT
Start: 2019-05-06 | End: 2019-05-10 | Stop reason: HOSPADM

## 2019-05-06 RX ORDER — DEXTROSE MONOHYDRATE 50 MG/ML
100 INJECTION, SOLUTION INTRAVENOUS PRN
Status: DISCONTINUED | OUTPATIENT
Start: 2019-05-06 | End: 2019-05-10 | Stop reason: HOSPADM

## 2019-05-06 RX ORDER — SODIUM CHLORIDE 0.9 % (FLUSH) 0.9 %
10 SYRINGE (ML) INJECTION PRN
Status: DISCONTINUED | OUTPATIENT
Start: 2019-05-06 | End: 2019-05-10 | Stop reason: HOSPADM

## 2019-05-06 RX ORDER — GABAPENTIN 400 MG/1
400 CAPSULE ORAL 3 TIMES DAILY
Status: DISCONTINUED | OUTPATIENT
Start: 2019-05-06 | End: 2019-05-06

## 2019-05-06 RX ORDER — 0.9 % SODIUM CHLORIDE 0.9 %
500 INTRAVENOUS SOLUTION INTRAVENOUS ONCE
Status: COMPLETED | OUTPATIENT
Start: 2019-05-06 | End: 2019-05-06

## 2019-05-06 RX ORDER — DEXTROSE MONOHYDRATE 25 G/50ML
12.5 INJECTION, SOLUTION INTRAVENOUS PRN
Status: DISCONTINUED | OUTPATIENT
Start: 2019-05-06 | End: 2019-05-10 | Stop reason: HOSPADM

## 2019-05-06 RX ORDER — OXYCODONE HYDROCHLORIDE AND ACETAMINOPHEN 5; 325 MG/1; MG/1
2 TABLET ORAL EVERY 6 HOURS PRN
Status: DISCONTINUED | OUTPATIENT
Start: 2019-05-06 | End: 2019-05-10 | Stop reason: HOSPADM

## 2019-05-06 RX ORDER — CETIRIZINE HYDROCHLORIDE 10 MG/1
10 TABLET ORAL DAILY
Status: DISCONTINUED | OUTPATIENT
Start: 2019-05-06 | End: 2019-05-10 | Stop reason: HOSPADM

## 2019-05-06 RX ORDER — HYDRALAZINE HYDROCHLORIDE 25 MG/1
25 TABLET, FILM COATED ORAL EVERY 8 HOURS SCHEDULED
Status: DISCONTINUED | OUTPATIENT
Start: 2019-05-06 | End: 2019-05-10 | Stop reason: HOSPADM

## 2019-05-06 RX ORDER — SODIUM CHLORIDE 0.9 % (FLUSH) 0.9 %
10 SYRINGE (ML) INJECTION EVERY 12 HOURS SCHEDULED
Status: DISCONTINUED | OUTPATIENT
Start: 2019-05-06 | End: 2019-05-10 | Stop reason: HOSPADM

## 2019-05-06 RX ORDER — PANTOPRAZOLE SODIUM 40 MG/1
40 TABLET, DELAYED RELEASE ORAL
Status: DISCONTINUED | OUTPATIENT
Start: 2019-05-07 | End: 2019-05-10 | Stop reason: HOSPADM

## 2019-05-06 RX ORDER — ONDANSETRON 2 MG/ML
4 INJECTION INTRAMUSCULAR; INTRAVENOUS EVERY 6 HOURS PRN
Status: DISCONTINUED | OUTPATIENT
Start: 2019-05-06 | End: 2019-05-10 | Stop reason: HOSPADM

## 2019-05-06 RX ADMIN — OXYCODONE HYDROCHLORIDE AND ACETAMINOPHEN 2 TABLET: 5; 325 TABLET ORAL at 16:57

## 2019-05-06 RX ADMIN — TIOTROPIUM BROMIDE 18 MCG: 18 CAPSULE ORAL; RESPIRATORY (INHALATION) at 16:08

## 2019-05-06 RX ADMIN — GABAPENTIN 800 MG: 800 TABLET ORAL at 15:23

## 2019-05-06 RX ADMIN — CETIRIZINE HYDROCHLORIDE 10 MG: 10 TABLET ORAL at 14:58

## 2019-05-06 RX ADMIN — Medication 10 ML: at 21:24

## 2019-05-06 RX ADMIN — ENOXAPARIN SODIUM 30 MG: 30 INJECTION SUBCUTANEOUS at 21:23

## 2019-05-06 RX ADMIN — DESMOPRESSIN ACETATE 40 MG: 0.2 TABLET ORAL at 21:23

## 2019-05-06 RX ADMIN — FLUTICASONE PROPIONATE 1 SPRAY: 50 SPRAY, METERED NASAL at 14:59

## 2019-05-06 RX ADMIN — HYDRALAZINE HYDROCHLORIDE 25 MG: 25 TABLET ORAL at 14:58

## 2019-05-06 RX ADMIN — GABAPENTIN 800 MG: 800 TABLET ORAL at 21:22

## 2019-05-06 RX ADMIN — INSULIN LISPRO 1 UNITS: 100 INJECTION, SOLUTION INTRAVENOUS; SUBCUTANEOUS at 22:21

## 2019-05-06 RX ADMIN — METOCLOPRAMIDE 10 MG: 5 INJECTION, SOLUTION INTRAMUSCULAR; INTRAVENOUS at 11:57

## 2019-05-06 RX ADMIN — HYDRALAZINE HYDROCHLORIDE 25 MG: 25 TABLET ORAL at 21:23

## 2019-05-06 RX ADMIN — OXYCODONE HYDROCHLORIDE AND ACETAMINOPHEN 2 TABLET: 5; 325 TABLET ORAL at 23:54

## 2019-05-06 RX ADMIN — ACETAMINOPHEN 650 MG: 325 TABLET ORAL at 14:58

## 2019-05-06 RX ADMIN — SODIUM CHLORIDE 500 ML: 9 INJECTION, SOLUTION INTRAVENOUS at 11:57

## 2019-05-06 RX ADMIN — DIPHENHYDRAMINE HYDROCHLORIDE 25 MG: 50 INJECTION, SOLUTION INTRAMUSCULAR; INTRAVENOUS at 11:57

## 2019-05-06 RX ADMIN — ISOSORBIDE DINITRATE 20 MG: 10 TABLET ORAL at 21:22

## 2019-05-06 RX ADMIN — ENOXAPARIN SODIUM 30 MG: 30 INJECTION SUBCUTANEOUS at 14:58

## 2019-05-06 RX ADMIN — ISOSORBIDE DINITRATE 20 MG: 10 TABLET ORAL at 14:58

## 2019-05-06 ASSESSMENT — PAIN SCALES - GENERAL
PAINLEVEL_OUTOF10: 10
PAINLEVEL_OUTOF10: 7
PAINLEVEL_OUTOF10: 7
PAINLEVEL_OUTOF10: 8
PAINLEVEL_OUTOF10: 6
PAINLEVEL_OUTOF10: 7
PAINLEVEL_OUTOF10: 7

## 2019-05-06 ASSESSMENT — PAIN DESCRIPTION - LOCATION
LOCATION: KNEE;WRIST
LOCATION: HEAD
LOCATION: GENERALIZED
LOCATION: KNEE;WRIST

## 2019-05-06 ASSESSMENT — PAIN DESCRIPTION - PAIN TYPE
TYPE: CHRONIC PAIN
TYPE: ACUTE PAIN
TYPE: CHRONIC PAIN

## 2019-05-06 ASSESSMENT — ENCOUNTER SYMPTOMS
ABDOMINAL PAIN: 1
PHOTOPHOBIA: 0
SHORTNESS OF BREATH: 0
CHEST TIGHTNESS: 0
TROUBLE SWALLOWING: 0
DIARRHEA: 0
RHINORRHEA: 0
SORE THROAT: 0
VOICE CHANGE: 0
BLOOD IN STOOL: 0
ABDOMINAL PAIN: 0
WHEEZING: 0
VOMITING: 0
NAUSEA: 0
COUGH: 1
BACK PAIN: 0

## 2019-05-06 ASSESSMENT — PAIN DESCRIPTION - ORIENTATION
ORIENTATION: RIGHT
ORIENTATION: RIGHT
ORIENTATION: RIGHT;LEFT

## 2019-05-06 ASSESSMENT — PAIN DESCRIPTION - DESCRIPTORS
DESCRIPTORS: ACHING
DESCRIPTORS: DISCOMFORT

## 2019-05-06 NOTE — DISCHARGE INSTR - COC
Continuity of Care Form    Patient Name: Monik Kumar   :  1975  MRN:  [id number]    Admit date:  2019  Discharge date:  19    Code Status Order: Prior   Advance Directives:     Admitting Physician:  No admitting provider for patient encounter. PCP: Annmarie Cedillo DO    Discharging Nurse: [facility] Unit/Room#:   Discharging Unit Phone Number: [phone]    Emergency Contact:   Extended Emergency Contact Information  Primary Emergency Contact: Radu Velásquez   [address] Phone: [phone]  Relation: Parent  Secondary Emergency Contact: [facility] Phone: [phone]  Relation: Brother/Sister    Past Surgical History:  Past Surgical History:   Procedure Laterality Date    ABDOMEN SURGERY      Patient had a PEG tube placed 2018, removed 2018    [address]    JOINT REPLACEMENT         Immunization History: There is no immunization history on file for this patient.     Active Problems:  Patient Active Problem List   Diagnosis Code    Asthma J45.909    [facility] J44.9    Pneumonia J18.9    HTN (hypertension) I10    Torn meniscus S83.209A    Chest pain R07.9    Pulmonary hypertension, moderate to severe (HCC) I27.20    Morbid obesity with BMI of 60.0-69.9, adult ([facility] E66.01, Z68.44    Obesity hypoventilation syndrome (HCC) E66.2    H/O tracheostomy Z98.890    STEPH (obstructive sleep apnea) G47.33    Right heart failure, unspecified (HCC) I50.810    Acute on chronic diastolic heart failure (HCC) I50.33    Acute on chronic congestive heart failure (HCC) I50.9    Diabetes mellitus, new onset ([facility] E11.9       Isolation/Infection:   Isolation          No Isolation            Nurse Assessment:  Last Vital Signs: /65   Pulse 71   Temp 98 °F (36.7 °C) (Oral)   Resp 22   Ht 5' (1.524 m)   Wt (!) 385 lb (174.6 kg)   LMP  (LMP Unknown)   SpO2 100%   BMI 75.19 kg/m²     Last documented pain score (0-10 scale): Pain Level: 10  Last Weight:   Wt Readings from Last 1 Encounters:   05/06/19 (!) 385 lb (174.6 kg)     Mental Status:  oriented and alert    IV Access:  - None    Nursing Mobility/ADLs:  Walking   Assisted  Transfer  Assisted  Bathing  Assisted  Dressing  Assisted  Toileting  Independent  Feeding  Independent  Med Admin  Independent  Med Delivery   whole    Wound Care Documentation and Therapy:        Elimination:  Continence:   · Bowel: yes  · Bladder: Yes  Urinary Catheter: None   Colostomy/Ileostomy/Ileal Conduit: No       Date of Last BM: 5/6/19    Intake/Output Summary (Last 24 hours) at 5/6/2019 1312  Last data filed at 5/6/2019 1306  Gross per 24 hour   Intake 500 ml   Output --   Net 500 ml     No intake/output data recorded. Safety Concerns: At Risk for Falls    Impairments/Disabilities:      None    Nutrition Therapy:  Current Nutrition Therapy:   - Oral Diet:  Low Sodium (2gm)    Routes of Feeding: Oral  Liquids: No Restrictions  Daily Fluid Restriction: yes - amount 1800ml  Last Modified Barium Swallow with Video (Video Swallowing Test): not done    Treatments at the Time of Hospital Discharge:   Respiratory Treatments: ***  Oxygen Therapy:  is on oxygen at 2-3L L/min per nasal cannula.   Ventilator:    - No ventilator support    Rehab Therapies: Physical Therapy and Occupational Therapy  Weight Bearing Status/Restrictions: No weight bearing restirctions  Other Medical Equipment (for information only, NOT a DME order):  walker  Other Treatments: ***    Patient's personal belongings (please select all that are sent with patient):  Daniel    RN SIGNATURE:  Electronically signed by Mike Priest RN on 5/9/19 at 11:27 AM    CASE MANAGEMENT/SOCIAL WORK SECTION    Inpatient Status Date: ***    Readmission Risk Assessment Score:  Readmission Risk              Risk of Unplanned Readmission:        0           Discharging to Facility/ Agency   · Name: [facility]   [address] [place] [id number]        Phone: [phone]       Fax: [phone]        ·     Dialysis Facility (if applicable)   · Name:  · Address:  · Dialysis Schedule:  · Phone:  · Fax:    / signature: Electronically signed by Jaime Quintero RN on 5/8/19 at 2:22 PM    PHYSICIAN SECTION    Prognosis: Good    Condition at Discharge: Stable    Rehab Potential (if transferring to Rehab): Good    Recommended Labs or Other Treatments After Discharge: BMP in 1 week, f/u outpatient with urology for Cystoscopy and CT urogram.     Physician Certification: I certify the above information and transfer of [facility]  is necessary for the continuing treatment of the diagnosis listed and that she requires [address] for greater 30 days.      Update Admission H&P: No change in H&P    PHYSICIAN SIGNATURE:  Electronically signed by Tatiana Gomez MD on 5/8/19 at 4:07 PM

## 2019-05-06 NOTE — TELEPHONE ENCOUNTER
Dr Tatiana Rodriguez, patient is current and due in for an appointment on 5/17/19. Per last dictation patient is on this medication. Please sign for refill if ok. Thank you.

## 2019-05-06 NOTE — PROGRESS NOTES
assessed for rehabilitation services?: Yes  Family / Caregiver Present: No  General Comment  Comments: RN okay'd patient to be seen for therapy services this p.m. Patient pleasant throughout therapy session. Pain Assessment  Pain Assessment: 0-10  Pain Level: 7  Pain Location: Knee;Wrist  Pain Orientation: Right  Non-Pharmaceutical Pain Intervention(s): Distraction; Emotional support; Therapeutic presence  Response to Pain Intervention: Patient Satisfied  Oxygen Therapy  O2 Device: Nasal cannula  O2 Flow Rate (L/min): 2.5 L/min    Social/Functional History  Social/Functional History  Lives With: Son  Type of Home: House(duplex)  Home Layout: One level, Able to Live on Main level with bedroom/bathroom  Home Access: Ramped entrance  Bathroom Shower/Tub: Tub/Shower unit  Bathroom Toilet: Bedside commode  Bathroom Equipment: Tub transfer bench  Bathroom Accessibility: Accessible  Home Equipment: Standard walker, Rolling walker, [address] 450 bed  Receives Help From: Family, Home health  ADL Assistance: Needs assistance  Bath: Moderate assistance  Dressing: Moderate assistance  Grooming: Minimal assistance  Toileting: Needs assistance  Homemaking Assistance: Needs assistance  Meal Prep: Maximal  Laundry: Maximal  Vacuuming: Maximal  Cleaning: Maximal  Gardening: Maximal  Yard Work: Maximal  Driving: Maximal  Shopping: Maximal  Homemaking Responsibilities: Yes(HHA and son take care of IADL tasks)  Meal Prep Responsibility: No  Laundry Responsibility: No  Cleaning Responsibility: No  Bill Paying/Finance Responsibility: No  Ambulation Assistance: Independent  Transfer Assistance: Independent  Active : No  Patient's  Info: son   Occupation: On disability  Leisure & Hobbies: watching movies  IADL Comments: HHA and son assist in completing IADL tasks.         Objective   Vision: Impaired  Vision Exceptions: Wears glasses at all times  Hearing: Within functional limits    Orientation  Overall Orientation Status: Within Functional Limits     Balance  Sitting Balance: Modified independent   Standing Balance: Contact guard assistance  Standing Balance  Time: ~3 minutes  Activity: personal hygiene and pivot transfer  Functional Mobility  Functional - Mobility Device: Standard Walker  Activity: Other  Assist Level: Contact guard assistance  Toilet Transfers  Toilet - Technique: Stand pivot  Equipment Used: Drop-arm commode  Toilet Transfer: Minimal assistance  ADL  Feeding: Modified independent   Grooming: Minimal assistance  UE Bathing: Maximum assistance  LE Bathing: Maximum assistance  UE Dressing: Minimal assistance  LE Dressing: Maximum assistance  Toileting: Moderate assistance  Additional Comments: Patient completed bed mobility at Min A and sat EOB for ~5 minutes. patient completed LB dressing at Max A to don and dof socks. Patient completed BSC transfer at [address] [place] [address] and completed tolieting at [address] [facility] for personal hygiene.    Tone RUE  RUE Tone: Normotonic  Tone LUE  LUE Tone: Normotonic  Coordination  Movements Are Fluid And Coordinated: Yes     Bed mobility  Supine to Sit: Minimal assistance  Sit to Supine: Minimal assistance  Scooting: Minimal assistance  Transfers  Sit to stand: Contact guard assistance  Stand to sit: Contact guard assistance     Cognition  Overall Cognitive Status: WFL        Sensation  Overall Sensation Status: WFL        LUE AROM : WFL  Left Hand AROM: WFL  RUE AROM : WFL  Right Hand AROM: WFL  LUE Strength  Gross LUE Strength: Exceptions to [facility]  L Hand Grasp: 3+/5  L Hand Release: 3+/5  RUE Strength  Gross RUE Strength: Exceptions to [facility]  R Hand Grasp: 3+/5  R Hand Release: 3+/5              Plan   Plan  Times per week: 4-5x/wk  Current Treatment Recommendations: Strengthening, Endurance Training, Patient/Caregiver Education & Training, Self-Care / ADL, Safety Education & Training    AM-PAC Score        AM-PAC Inpatient Daily Activity Raw Score: 15  AM-PAC Inpatient ADL T-Scale Score : 34.69  ADL Inpatient CMS 0-100% Score: 56.46  ADL Inpatient CMS G-Code Modifier : CK    Goals  Short term goals  Time Frame for Short term goals: Patient will, by discharge  Short term goal 1: demonstrate UB self-cares at [place] [address] term goal 2: demonstrate LB self-cares at [address] [place] [address] term goal 3: demonstrate BSC transfer at Supervision  Short term goal 4: demonstrate ~15 minutes dynamic sitting balance to engage in functional activities at SBA and 0 LOB  Short term goal 5: demonstrate ~5 minutes of dynamic standing tolerance to participate in grooming tasks sinkside at [facility]       Therapy Time   Individual Concurrent Group Co-treatment   Time In 1545         Time Out 1608         Minutes 23         Timed Code Treatment Minutes: [address], OTR/L

## 2019-05-06 NOTE — CARE COORDINATION
Case Management Initial Discharge Plan  Claudell Dash,             Met with:patient to discuss discharge plans. Information verified: address, contacts, phone number, , insurance Yes  PCP: Abelino Breen DO  Date of last visit: \"1 month ago\"    Insurance Provider: [employer]    Discharge Planning    Living Arrangements:  Family Members(son)   Support Systems:  Family Members    Home has  stories  1 stairs to climb to get into front door, NA stairs to climb to reach second floor  Location of bedroom/bathroom in home Main floor living    Patient able to perform ADL's:Independent    Current Services (outpatient & in home) Recently had Brandi Crews and Nurse  DME equipment: Maximilian Villarreal, Hospital Bed, O2  DME provider: Pharmacy counter    Pharmacy: [address] Medications:  No  Does patient want to participate in local refill/ meds to beds program?  No    Potential Assistance Needed:  Home Care(Patient states her [facility], [facility]. needs recertified)    Patient agreeable to home care: Yes  Freedom of choice provided:  yes    Prior SNF/Rehab Placement and Facility: No  Agreeable to SNF/Rehab: No  [place] of choice provided: n/a   Evaluation: no    Expected Discharge date:     Patient expects to be discharged to:  Home with [facility], [facility].. Follow Up Appointment: Best Day/ Time:      Transportation provider: Patient states that she has transportation. Transportation arrangements needed for discharge: No    Readmission Risk              Risk of Unplanned Readmission:        0             Does patient have a readmission risk score greater than 14?: Not assessed in ED. If yes, follow-up appointment must be made within 7 days of discharge. Discharge Plan: Home with HC.            Electronically signed by Leonid Ivy RN on 19 at 1:01 PM

## 2019-05-06 NOTE — ED NOTES
Pt to ED with c/o lightheadedness and headache that started this morning. Pt has hx of CHF, COPD, but has never been diagnosed with diabetes. Per EMS pts blood sugar was 453 upon first responders arrival to scene. Pt denies cp but has chronic sob, no increasing sob today. Pt wears 3L of home O2 daily. Pt is alert and oriented, rr even and non-labored, NAD, await orders.       Osbaldo Buckley RN  05/06/19 4627

## 2019-05-06 NOTE — H&P
[facility].    HISTORY AND PHYSICAL EXAMINATION            Date:   5/6/2019  Patient name:  Isauro Herrera  Date of admission:  5/6/2019 11:19 AM  MRN:   [id number]  YOB: 1975    CHIEF COMPLAINT     Dizziness, lightheadedness    HISTORY OF PRESENT ILLNESS      The patient is a 37 y.o.  female who is admitted to the hospital for dizziness. Patient started with lightheadedness and dizziness this morning while she was sitting. She was also complaining of feeling goofy. She also had a headache which is now better. She called EMS as she thought she would pass out. EMS checked her blood sugar which was 454. Pt stated that she had breakfast and 30 minutes prior to blood sugar checking by EMS. Patient is a prediabetic with A1c of 6.3 in February 2019. She denied any chest pain, palpitation, increased shortness of breath prior to this episode. She had previous histories of dizziness and advised to change her posterior. She has a history of pulmonary hypertension, COPD. She is on home oxygen. Currently awaiting to see nephrologist.  She is on Lasix 40 mg twice a day, torsemide 20 mg, Aldactone 25 mg and metolazone 2.5 mg. Over the weekend she had increased urine output. She had gained 30 pounds in last 1 week which she thinks she has lost over the weekend. He was given multiple diuretics as she had fluid retention. She had history of mild sinusitis symptoms 2 days ago but denies history of fever or chills. Denies history of abdominal pain, vomiting, nausea. Denies diarrhea or constipation. Her usual exercise tolerance as 20 feet. She is using oxygen 2-3 L at home      Significant lab  Potassium 3.4, chloride 88, bicarbonate 32, BUN 24, reacting and 1.22(baseline 0.9), POC glucose 167, proBNP 29, troponin 6  WBC 8.5, hemoglobin 9.4, MCV 92    Co morbidities    1.   Hypertension- on hydralazine and isosorbide  2. History of CHF- last echo in 2019 shows ejection fraction of 75%, moderately dilated right ventricle and severely decreased systolic function- on Lasix 40 mg twice a day, metolazone 2.5 mg, Aldactone 25 mg.no change in diuretics made since last 2-3 months  3. Moderate pulmonary hypertension  4. COPD- on home oxygen 2-3 L, exercise tolerance 20 feet, on Spiriva and Symbicort  5. History of cardiac arrest  6. STEPH- awaiting CPAP machine    PAST MEDICAL HISTORY       has a past medical history of HIEN (acute kidney injury) ([facility] Asthma, CHF, COPD, Diabetes mellitus, new onset ([facility] HTN, Hyperlipidemia, Morbid obesity with BMI of 60.0-69.9, adult ([facility] Neuromuscular disorder ([facility] Pneumonia, Pulmonary hypertension, moderate to severe ([facility] and Torn meniscus. PAST SURGICAL HISTORY       has a past surgical history that includes  section (); Abdomen surgery; and joint replacement. HOME MEDICATIONS        Prior to Admission medications    Medication Sig Start Date End Date Taking? Authorizing Provider   oxyCODONE-acetaminophen (PERCOCET)  MG per tablet Take 1 tablet by mouth every 6 hours as needed for Pain.    Yes Historical Provider, MD   furosemide (LASIX) 40 MG tablet Take 1 tablet by mouth 2 times daily 19  Yes Bandar Pedro MD   metolazone (ZAROXOLYN) 2.5 MG tablet Take 2.5 mg by mouth daily   Yes Historical Provider, MD   isosorbide dinitrate (ISORDIL) 20 MG tablet Take 20 mg by mouth 3 times daily   Yes Historical Provider, MD   spironolactone (ALDACTONE) 25 MG tablet Take 25 mg by mouth daily   Yes Historical Provider, MD   pantoprazole sodium (PROTONIX) 40 MG PACK packet Take 40 mg by mouth every morning (before breakfast)   Yes Historical Provider, MD   atorvastatin (LIPITOR) 40 MG tablet Take 40 mg by mouth nightly   Yes Historical Provider, MD   ferrous sulfate 325 (65 Fe) MG EC tablet Take 325 g by mouth 2 times daily (with meals) 18  Yes Review of Systems   Constitutional: Negative for chills and fever. HENT: Negative for postnasal drip, rhinorrhea, sore throat, trouble swallowing and voice change. Eyes: Negative for visual disturbance. Respiratory: Positive for cough (mild). Negative for chest tightness, shortness of breath and wheezing. Cardiovascular: Negative for chest pain, palpitations and leg swelling. Gastrointestinal: Positive for abdominal pain. Negative for blood in stool, nausea and vomiting. Genitourinary: Positive for frequency and hematuria (occasionally). Negative for dysuria and flank pain. Musculoskeletal: Negative. Skin: Negative. Neurological: Positive for dizziness and light-headedness. Negative for tremors and syncope. Hematological: Negative. Psychiatric/Behavioral: Negative. Negative for agitation and confusion. PHYSICAL EXAM      /62   Pulse 72   Temp 98.2 °F (36.8 °C) (Oral)   Resp 20   Ht 5' (1.524 m)   Wt (!) 385 lb (174.6 kg)   LMP  (LMP Unknown)   SpO2 100%   BMI 75.19 kg/m²      Physical Exam   Constitutional: She is oriented to person, place, and time. No distress. Mildly Dehydrated   Eyes: Conjunctivae are normal. No scleral icterus. Neck: Neck supple.   kelois seen on the neck   Cardiovascular: Normal rate, regular rhythm and normal heart sounds. Exam reveals no friction rub. No murmur heard. Pulmonary/Chest: Effort normal and breath sounds normal. No stridor. No respiratory distress. She has no rales. She exhibits no tenderness. Abdominal: Soft. Bowel sounds are normal. She exhibits distension. There is tenderness (minimal in LUQ). There is no rebound and no guarding. Musculoskeletal: She exhibits no edema. Neurological: She is alert and oriented to person, place, and time. No cranial nerve deficit. She exhibits normal muscle tone. Skin: No rash noted.         DIAGNOSTICS      Laboratory Testing:  CBC:   Recent Labs     05/06/19  1139   WBC 8.5   HGB 9.4*        BMP:    Recent Labs     05/06/19  1139      K 3.4*   CL 88*   CO2 32*   BUN 24*   CREATININE 1.22*   GLUCOSE 254*     S. Calcium:  Recent Labs     05/06/19  1139   CALCIUM 8.7     S. Ionized Calcium:No results for input(s): IONCA in the last 72 hours. S. Magnesium:No results for input(s): MG in the last 72 hours. S. Phosphorus:No results for input(s): PHOS in the last 72 hours. S. Glucose:  Recent Labs     05/06/19  1340   POCGLU 167*     Glycosylated hemoglobin A1C: No results for input(s): LABA1C in the last 72 hours. INR: No results for input(s): INR in the last 72 hours. Hepatic functions: No results for input(s): ALKPHOS, ALT, AST, PROT, BILITOT, BILIDIR, LABALBU in the last 72 hours. Pancreatic functions:No results for input(s): LACTA, AMYLASE in the last 72 hours. S. Lactic Acid: No results for input(s): LACTA in the last 72 hours. Cardiac enzymes:No results for input(s): CKTOTAL, CKMB, CKMBINDEX, TROPONINI in the last 72 hours. BNP:No results for input(s): BNP in the last 72 hours. Lipid profile: No results for input(s): CHOL, TRIG, HDL, LDLCALC in the last 72 hours. Invalid input(s): LDL  Blood Gases: No results found for: PH, PCO2, PO2, HCO3, O2SAT  Thyroid functions:   Lab Results   Component Value Date    TSH 2.36 02/23/2019      Chest x-ray   cardiomegaly, no infiltrations seen    ASSESSMENT     Principal Problem:    Dizziness  Active Problems:    HIEN (acute kidney injury) ([facility]    COPD (chronic obstructive pulmonary disease) ([facility])    HTN (hypertension)    Pulmonary hypertension, moderate to severe ([facility])    Morbid obesity with BMI of 60.0-69.9, adult ([facility])    STEPH (obstructive sleep apnea)    Right heart failure, unspecified ([facility]    Diabetes mellitus, new onset ([facility]  Resolved Problems:    * No resolved hospital problems. *        PLAN      1. Dizziness likely secondary to over diuresis. Patient received 500 mL of bolusing ER and now is symptomatic.   Orthostatic

## 2019-05-06 NOTE — ED PROVIDER NOTES
[facility] [facility]     Emergency Department     Faculty Attestation    I performed a history and physical examination of the patient and discussed management with the resident. I reviewed the residents note and agree with the documented findings and plan of care. Any areas of disagreement are noted on the chart. I was personally present for the key portions of any procedures. I have documented in the chart those procedures where I was not present during the key portions. I have reviewed the emergency nurses triage note. I agree with the chief complaint, past medical history, past surgical history, allergies, medications, social and family history as documented unless otherwise noted below. For Physician Assistant/ Nurse Practitioner cases/documentation I have personally evaluated this patient and have completed at least one if not all key elements of the E/M (history, physical exam, and MDM). Additional findings are as noted. Patient presents complaining of dizziness, headache, and generalized weakness that she has had for the past couple of days. She says she has been urinating very frequently over the past couple of days as well. Patient does have a history of CHF and so she thought she was urinating more frequently due to her diuretics. Patient was brought in by EMS and patient was found to have a sugar in the 400s. Patient says that she has no history of diabetes. On exam, patient is resting comfortably in the bed. She is morbidly obese. Patient does have diminished breath sounds at the bilateral bases. Heart sounds are normal.  Abdomen is soft and nontender. There is 2+ pitting edema to the bilateral lower extremities. We'll get labs as well as chest x-ray and plan to admit patient.     EKG Interpretation    Interpreted by emergency department physician    Rhythm: normal sinus   Rate: normal  Axis: normal  Ectopy: none  Conduction: normal  ST Segments: nonspecific changes  T Waves: non specific changes  Q Waves: none    Clinical Impression: non-specific EKG    Jana Jensen MD  Attending Emergency  Physician              Burnice Pallas, MD  05/06/19 3385

## 2019-05-06 NOTE — ED PROVIDER NOTES
[facility]//MED SURG  Emergency Department Encounter  Mid Level Provider     Pt Name: Claudell Dash  MRN: [id number]  Victorinogfabelardo 1975  Date of evaluation: 19  PCP:  Abelino Breen, [address] [place] [address]       Chief Complaint   Patient presents with    Dizziness    Headache    Hyperglycemia       HISTORY OF PRESENT ILLNESS  (Location/Symptom, Timing/Onset,Context/Setting, Quality, Duration, Modifying Factors, Severity.)      Claudell Dash is a 37 y.o. female who presents with feeling lightheaded this morning. Patient denies chest pain or shortness of breath. She's also noticed increased thirst and urination. She states she is home O2 dependent for CHF and COPD. Patient also complaining of severe left-sided headache. She has no nausea vomiting or diarrhea. She states no photophobia. Patient did arrive by EMS. Does not appear acutely ill. EMS did report however her blood sugar for them was 453 and patient does not have history of diabetes     PAST MEDICAL [facility] / SOCIAL / FAMILY HISTORY      has a past medical history of HIEN (acute kidney injury) ([address] Asthma, CHF, COPD, Diabetes mellitus, new onset ([address] HTN, Hyperlipidemia, Morbid obesity with BMI of 60.0-69.9, adult ([address] Neuromuscular disorder ([address] Pneumonia, Pulmonary hypertension, moderate to severe ([address] and Torn meniscus. has a past surgical history that includes  section (); Abdomen surgery; and joint replacement.     Social History     Socioeconomic History    Marital status: Single     Spouse name: Not on file    Number of children: Not on file    Years of education: Not on file    Highest education level: Not on file   Occupational History    Not on file   Social Needs    Financial resource strain: Not on file    Food insecurity:     Worry: Not on file     Inability: Not on file    Transportation needs:     Medical: Not on file     Non-medical: Not on file   Tobacco Use    Smoking status: Former Smoker     Packs/day: 1.00     Years: 15.00     Pack years: 15.00     Last attempt to quit: 2018     Years since quittin.3    Smokeless tobacco: Never Used   Substance and Sexual Activity    Alcohol use: No    Drug use: No    Sexual activity: Yes   Lifestyle    Physical activity:     Days per week: Not on file     Minutes per session: Not on file    Stress: Not on file   Relationships    Social connections:     Talks on phone: Not on file     Gets together: Not on file     Attends Nondenominational service: Not on file     Active member of club or organization: Not on file     Attends meetings of clubs or organizations: Not on file     Relationship status: Not on file    Intimate partner violence:     Fear of current or ex partner: Not on file     Emotionally abused: Not on file     Physically abused: Not on file     Forced sexual activity: Not on file   Other Topics Concern    Not on file   Social History Narrative    Not on file       History reviewed. No pertinent family history. Allergies:  Bee venom; Aspirin; Beta adrenergic blockers; and Sulfa antibiotics    Home Medications:  Prior to Admission medications    Medication Sig Start Date End Date Taking? Authorizing Provider   oxyCODONE-acetaminophen (PERCOCET)  MG per tablet Take 1 tablet by mouth every 6 hours as needed for Pain.    Yes Historical Provider, MD   furosemide (LASIX) 40 MG tablet Take 1 tablet by mouth 2 times daily 19  Yes Екатерина Quiroz MD   metolazone (ZAROXOLYN) 2.5 MG tablet Take 2.5 mg by mouth daily   Yes Historical Provider, MD   isosorbide dinitrate (ISORDIL) 20 MG tablet Take 20 mg by mouth 3 times daily   Yes Historical Provider, MD   spironolactone (ALDACTONE) 25 MG tablet Take 25 mg by mouth daily   Yes Historical Provider, MD   pantoprazole sodium (PROTONIX) 40 MG PACK packet Take 40 mg by mouth every morning (before breakfast)   Yes Historical Provider, MD   atorvastatin (LIPITOR) 40 MG tablet Take 40 mg by mouth nightly   Yes Historical Provider, MD   ferrous sulfate 325 (65 Fe) MG EC tablet Take 325 g by mouth 2 times daily (with meals) 4/20/18  Yes Historical Provider, MD   fluticasone (FLONASE) 50 MCG/ACT nasal spray 1 spray by Nasal route daily   Yes Historical Provider, MD   gabapentin (NEURONTIN) 600 MG tablet Take 800 mg by mouth 3 times daily. Yes Historical Provider, MD   hydrALAZINE (APRESOLINE) 25 MG tablet Take 75 mg by mouth daily   Yes Historical Provider, MD   ipratropium-albuterol (DUONEB) 0.5-2.5 (3) MG/3ML SOLN nebulizer solution Inhale 3 mLs into the lungs every 6 hours as needed   Yes Historical Provider, MD   loratadine (CLARITIN) 10 MG tablet Take 10 mg by mouth daily   Yes Historical Provider, MD   Magnesium Oxide (MAG-200) 200 MG TABS Take 400 mg by mouth 2 times daily   Yes Historical Provider, MD   ondansetron (ZOFRAN-ODT) 4 MG disintegrating tablet Take 4 mg by mouth every 8 hours as needed   Yes Historical Provider, MD   tiotropium (SPIRIVA) 18 MCG inhalation capsule Inhale 1 capsule into the lungs 8/31/17  Yes Historical Provider, MD   sertraline (ZOLOFT) 100 MG tablet Take 100 mg by mouth daily   Yes Historical Provider, MD   albuterol sulfate  (90 Base) MCG/ACT inhaler Inhale 2 puffs into the lungs every 6 hours as needed 8/31/17  Yes Historical Provider, MD   albuterol (PROVENTIL) (2.5 MG/3ML) 0.083% nebulizer solution Take 3 mLs by nebulization every 6 hours as needed for Wheezing. 9/24/14  Yes Genaro Haines MD   budesonide-formoterol (SYMBICORT) 160-4.5 MCG/ACT AERO Inhale 2 puffs into the lungs 2 times daily. 9/24/14  Yes Jb Aguilera MD   nicotine (NICODERM CQ) 14 MG/24HR Place 1 patch onto the skin daily. 9/24/14   Genaro Haines MD       REVIEW OF SYSTEMS    (2-9 systems for level 4, 10 or more for level 5)      Review of Systems   Constitutional: Negative for chills and fever. Eyes: Negative for photophobia.    Respiratory: Negative for shortness of breath. Cardiovascular: Negative for chest pain. Gastrointestinal: Negative for abdominal pain, diarrhea, nausea and vomiting. Endocrine: Positive for polydipsia and polyuria. Genitourinary: Negative for frequency. Musculoskeletal: Negative for back pain. Neurological: Positive for headaches. Negative for weakness and numbness. PHYSICALEXAM   (upto 7 for level 4, 8 or more for level 5)      INITIAL VITALS:  height is 5' (1.524 m) and weight is 385 lb (174.6 kg) (abnormal). Her oral temperature is 98.2 °F (36.8 °C). Her blood pressure is 104/62 and her pulse is 72. Her respiration is 20 and oxygen saturation is 100%. Physical Exam   Constitutional: She is oriented to person, place, and time. She appears well-developed and well-nourished. No distress. Morbid obesity   HENT:   Head: Normocephalic. Eyes: Pupils are equal, round, and reactive to light. Neck: Normal range of motion. Neck supple. Cardiovascular: Normal rate. Pulmonary/Chest: Effort normal. No respiratory distress. Diminished   Abdominal: Soft. There is no tenderness. Musculoskeletal: Normal range of motion. Neurological: She is alert and oriented to person, place, and time. Skin: Skin is warm and dry. Capillary refill takes less than 2 seconds. 3+ distal pitting edema bilaterally   Psychiatric: She has a normal mood and affect. Her behavior is normal. Judgment and thought content normal.   Nursing note and vitals reviewed.       DIFFERENTIAL  DIAGNOSIS   New-onset diabetes, dehydration, headache    PLAN (LABS / IMAGING / EKG):  Orders Placed This Encounter   Procedures    XR CHEST PORTABLE    BETA-HYDROXYBUTYRATE    Basic Metabolic Panel    CBC Auto Differential    Brain Natriuretic Peptide    Troponin    Basic Metabolic Panel w/ Reflex to MG    CBC auto differential    Hemoglobin A1C    DRUG SCREEN MULTI URINE    Urinalysis Reflex to Culture    Vitamin B12 & Folate    Iron and TIBC    Ferritin    Microscopic Urinalysis    DIET LOW SODIUM 2 GM; 1800 ml    Telemetry Monitoring    Vital signs per unit routine    Up as tolerated    Daily weights    Intake and output    Place intermittent pneumatic compression device    Elevate heels off of bed at all times if patient is not able to move lower extremities    Turn or assist with turn every 2 hours if patient is unable to turn self. Remind patient to turn if necessary.  Inspect skin per unit guidelines    Maintain HOB at the lowest elevation consistent with medical plan of care    Orthostatic blood pressure and pulse    Orthostatic blood pressure and pulse    HYPOGLYCEMIA TREATMENT: blood glucose less than 50 mg/dL and patient  ALERT and TOLERATING PO    HYPOGLYCEMIA TREATMENT: blood glucose less than 70 mg/dL and patient ALERT and TOLERATING PO    HYPOGLYCEMIA TREATMENT: blood glucose less than 70 mg/dL and patient NOT ALERT or NPO    Full Code    Inpatient consult to Internal Medicine    OT eval and treat    PT evaluation and treat    Initiate Oxygen Therapy Protocol    CPAP    POC Glucose Fingerstick    POCT glucose    POC Glucose Fingerstick    POCT glucose    POCT Glucose    EKG 12 Lead    PATIENT STATUS (FROM ED OR OR/PROCEDURAL) Inpatient    PATIENT STATUS (FROM ED OR OR/PROCEDURAL) Inpatient       MEDICATIONS ORDERED:  Orders Placed This Encounter   Medications    metoclopramide (REGLAN) injection 10 mg    diphenhydrAMINE (BENADRYL) injection 25 mg    0.9 % sodium chloride bolus    atorvastatin (LIPITOR) tablet 40 mg    fluticasone (FLONASE) 50 MCG/ACT nasal spray 1 spray    ipratropium-albuterol (DUONEB) nebulizer solution 3 mL    isosorbide dinitrate (ISORDIL) tablet 20 mg    DISCONTD: loratadine (CLARITIN) tablet 10 mg     Order Specific Question:   Please select a reason the therapeutic interchange was not accepted:      Answer:   Okay for Pharmacy to [address] [place], [place] [address]maday: pantoprazole sodium (PROTONIX) packet 40 mg    tiotropium (SPIRIVA) inhalation capsule 18 mcg    sodium chloride flush 0.9 % injection 10 mL    sodium chloride flush 0.9 % injection 10 mL    magnesium hydroxide (MILK OF MAGNESIA) 400 MG/5ML suspension 30 mL    ondansetron (ZOFRAN) injection 4 mg    enoxaparin (LOVENOX) injection 30 mg    cetirizine (ZYRTEC) tablet 10 mg    pantoprazole (PROTONIX) tablet 40 mg    hydrALAZINE (APRESOLINE) tablet 25 mg    acetaminophen (TYLENOL) tablet 650 mg    DISCONTD: gabapentin (NEURONTIN) capsule 400 mg    gabapentin (NEURONTIN) tablet 800 mg    oxyCODONE-acetaminophen (PERCOCET) 5-325 MG per tablet 2 tablet    insulin lispro (HUMALOG) injection vial 0-6 Units    insulin lispro (HUMALOG) injection vial 0-3 Units    glucose (GLUTOSE) 40 % oral gel 15 g    dextrose 50 % solution 12.5 g    glucagon (rDNA) injection 1 mg    dextrose 5 % solution       Controlled Substances Monitoring:      DIAGNOSTIC RESULTS / EMERGENCY DEPARTMENT COURSE / [facility]     RADIOLOGY:   I directly visualized(with the attending physician) the following  images and reviewed the radiologist interpretations:  Xr Chest Portable    Result Date: 5/6/2019  EXAMINATION: SINGLE XRAY VIEW OF THE CHEST 5/6/2019 12:51 pm COMPARISON: Chest radiograph dated 02/23/2019 HISTORY: ORDERING SYSTEM PROVIDED HISTORY: SOB TECHNOLOGIST PROVIDED HISTORY: SOB Ordering Physician Provided Reason for Exam: upright portable, shortness of breath FINDINGS: Mild cardiomegaly. No infiltrates. No effusions. No pneumothorax. Mild cardiomegaly. No acute findings in the chest.       XR CHEST PORTABLE   Final Result   Mild cardiomegaly.   No acute findings in the chest.             LABS:  Results for orders placed or performed during the hospital encounter of 05/06/19   BETA-HYDROXYBUTYRATE   Result Value Ref Range    Beta-Hydroxybutyrate 0.07 0.02 - 0.27 mmol/L   Basic Metabolic Panel   Result Value Ref Range    Glucose 254 (H) 70 - 99 mg/dL    BUN 24 (H) 6 - 20 mg/dL    CREATININE 1.22 (H) 0.50 - 0.90 mg/dL    Bun/Cre Ratio NOT REPORTED 9 - 20    Calcium 8.7 8.6 - 10.4 mg/dL    Sodium 136 135 - 144 mmol/L    Potassium 3.4 (L) 3.7 - 5.3 mmol/L    Chloride 88 (L) 98 - 107 mmol/L    CO2 32 (H) 20 - 31 mmol/L    Anion Gap 16 9 - 17 mmol/L    GFR Non-African American 48 (L) >60 mL/min    GFR  58 (L) >60 mL/min    GFR Comment          GFR Staging NOT REPORTED    CBC Auto Differential   Result Value Ref Range    WBC 8.5 3.5 - 11.3 k/uL    RBC 3.50 (L) 3.95 - 5.11 m/uL    Hemoglobin 9.4 (L) 11.9 - 15.1 g/dL    Hematocrit 32.2 (L) 36.3 - 47.1 %    MCV 92.0 82.6 - 102.9 fL    MCH 26.9 25.2 - 33.5 pg    MCHC 29.2 28.4 - 34.8 g/dL    RDW 13.8 11.8 - 14.4 %    Platelets 812 733 - 344 k/uL    MPV 9.9 8.1 - 13.5 fL    NRBC Automated 0.0 0.0 per 100 WBC    Differential Type NOT REPORTED     Seg Neutrophils 73 (H) 36 - 65 %    Lymphocytes 18 (L) 24 - 43 %    Monocytes 6 3 - 12 %    Eosinophils % 2 1 - 4 %    Basophils 0 0 - 2 %    Immature Granulocytes 1 (H) 0 %    Segs Absolute 6.15 1.50 - 8.10 k/uL    Absolute Lymph # 1.57 1.10 - 3.70 k/uL    Absolute Mono # 0.54 0.10 - 1.20 k/uL    Absolute Eos # 0.16 0.00 - 0.44 k/uL    Basophils # <0.03 0.00 - 0.20 k/uL    Absolute Immature Granulocyte 0.07 0.00 - 0.30 k/uL    WBC Morphology NOT REPORTED     RBC Morphology NOT REPORTED     Platelet Estimate NOT REPORTED    Brain Natriuretic Peptide   Result Value Ref Range    Pro-BNP 29 <300 pg/mL    BNP Interpretation Pro-BNP Reference Range:    Troponin   Result Value Ref Range    Troponin, High Sensitivity 6 0 - 14 ng/L    Troponin T NOT REPORTED <0.03 ng/mL    Troponin Interp NOT REPORTED    DRUG SCREEN MULTI URINE   Result Value Ref Range    Amphetamine Screen, Ur NEGATIVE NEGATIVE    Barbiturate Screen, Ur NEGATIVE NEGATIVE    Benzodiazepine Screen, Urine NEGATIVE NEGATIVE    Cocaine Metabolite, Urine NEGATIVE NEGATIVE    Methadone Screen, Urine NEGATIVE NEGATIVE    Opiates, Urine NEGATIVE NEGATIVE    Phencyclidine, Urine NEGATIVE NEGATIVE    Propoxyphene, Urine NOT REPORTED NEGATIVE    Cannabinoid Scrn, Ur NEGATIVE NEGATIVE    Oxycodone Screen, Ur POSITIVE (A) NEGATIVE    Methamphetamine, Urine NOT REPORTED NEGATIVE    Tricyclic Antidepressants, Urine NOT REPORTED NEGATIVE    MDMA, Urine NOT REPORTED NEGATIVE    Buprenorphine Urine NOT REPORTED NEGATIVE    Test Information       Assay provides medical screening only. The absence of expected drug(s) and/or metabolite(s) may indicate diluted or adulterated urine, limitations of testing or timing of collection. Urinalysis Reflex to Culture   Result Value Ref Range    Color, UA YELLOW YELLOW    Turbidity UA CLEAR CLEAR    Glucose, Ur NEGATIVE NEGATIVE    Bilirubin Urine NEGATIVE NEGATIVE    Ketones, Urine NEGATIVE NEGATIVE    Specific Gravity, UA 1.016 1.005 - 1.030    Urine Hgb SMALL (A) NEGATIVE    pH, UA 6.5 5.0 - 8.0    Protein, UA NEGATIVE NEGATIVE    Urobilinogen, Urine Normal Normal    Nitrite, Urine NEGATIVE NEGATIVE    Leukocyte Esterase, Urine NEGATIVE NEGATIVE    Urinalysis Comments NOT REPORTED    Microscopic Urinalysis   Result Value Ref Range    -          WBC, UA 2 TO 5 0 - 5 /HPF    RBC, UA 10 TO 20 0 - 4 /HPF    Casts UA  0 - 8 /LPF    Crystals UA NOT REPORTED None /HPF    Epithelial Cells UA 2 TO 5 0 - 5 /HPF    Renal Epithelial, Urine NOT REPORTED 0 /HPF    Bacteria, UA NOT REPORTED None    Mucus, UA NOT REPORTED None    Trichomonas, UA NOT REPORTED None    Amorphous, UA NOT REPORTED None    Other Observations UA NOT REPORTED NOT REQ. Yeast, UA NOT REPORTED None   POC Glucose Fingerstick   Result Value Ref Range    POC Glucose 167 (H) 65 - 105 mg/dL   POC Glucose Fingerstick   Result Value Ref Range    POC Glucose 137 (H) 65 - 105 mg/dL       EMERGENCY DEPARTMENT COURSE:  Patient understands and concern for new onset diabetes. Does agree to admission for further workup.   Reports headache much better after her treatment. CONSULTS:  Medicine agrees to admission    PROCEDURES:  None    FINAL IMPRESSION      1. Diabetes mellitus, new onset ([facility]          DISPOSITION / PLAN     DISPOSITION Admitted    PATIENT REFERRED TO:  DO Shraddha [address]   [address] [place] [address]  [phone]            DISCHARGE MEDICATIONS:  Current Discharge Medication List          TOLU Gannon CNP   Emergency Medicine Nurse Practitioner    (Please note that portions of this note were completed with a voice recognitionprogram.  Efforts were made to edit the dictations but occasionally words are mis-transcribed.)        TOLU Gannon CNP  05/06/19 [id number] Yo Gregorio, TOLU Styles CNP  05/06/19 [id number]

## 2019-05-06 NOTE — PLAN OF CARE
Problem: Risk for Impaired Skin Integrity  Goal: Tissue integrity - skin and mucous membranes  Description  Structural intactness and normal physiological function of skin and  mucous membranes. Outcome: Ongoing   Skin assessment complete. No signs of skin breakdown noted. Patient turns self and educated on risk for impaired integrity. Problem: Falls - Risk of:  Goal: Will remain free from falls  Description  Will remain free from falls  Outcome: Ongoing   Patient remained free of falls. Bed in lowest position, non skid socks on, call light within reach and used appropriately, room decluttered. Problem: Falls - Risk of:  Goal: Absence of physical injury  Description  Absence of physical injury  Outcome: Ongoing     Problem: Pain:  Goal: Pain level will decrease  Description  Pain level will decrease  Outcome: Ongoing  Pain assessment ongoing. Patient's pain being managed with medication.

## 2019-05-07 ENCOUNTER — APPOINTMENT (OUTPATIENT)
Dept: ULTRASOUND IMAGING | Age: 44
DRG: 420 | End: 2019-05-07
Payer: MEDICARE

## 2019-05-07 ENCOUNTER — APPOINTMENT (OUTPATIENT)
Dept: CT IMAGING | Age: 44
DRG: 420 | End: 2019-05-07
Payer: MEDICARE

## 2019-05-07 LAB
ABSOLUTE EOS #: 0.16 K/UL (ref 0–0.44)
ABSOLUTE IMMATURE GRANULOCYTE: 0.03 K/UL (ref 0–0.3)
ABSOLUTE LYMPH #: 1.65 K/UL (ref 1.1–3.7)
ABSOLUTE MONO #: 0.67 K/UL (ref 0.1–1.2)
ANION GAP SERPL CALCULATED.3IONS-SCNC: 15 MMOL/L (ref 9–17)
BASOPHILS # BLD: 0 % (ref 0–2)
BASOPHILS ABSOLUTE: <0.03 K/UL (ref 0–0.2)
BUN BLDV-MCNC: 26 MG/DL (ref 6–20)
BUN/CREAT BLD: ABNORMAL (ref 9–20)
CALCIUM SERPL-MCNC: 8.4 MG/DL (ref 8.6–10.4)
CHLORIDE BLD-SCNC: 91 MMOL/L (ref 98–107)
CO2: 33 MMOL/L (ref 20–31)
COMPLEMENT C3: 211 MG/DL (ref 90–180)
COMPLEMENT C4: 48 MG/DL (ref 10–40)
CREAT SERPL-MCNC: 1.14 MG/DL (ref 0.5–0.9)
DIFFERENTIAL TYPE: ABNORMAL
EKG ATRIAL RATE: 76 BPM
EKG P AXIS: 39 DEGREES
EKG P-R INTERVAL: 146 MS
EKG Q-T INTERVAL: 422 MS
EKG QRS DURATION: 84 MS
EKG QTC CALCULATION (BAZETT): 474 MS
EKG R AXIS: 7 DEGREES
EKG T AXIS: -11 DEGREES
EKG VENTRICULAR RATE: 76 BPM
EOSINOPHILS RELATIVE PERCENT: 2 % (ref 1–4)
FERRITIN: 139 UG/L (ref 13–150)
FREE KAPPA/LAMBDA RATIO: 1.53 (ref 0.26–1.65)
GFR AFRICAN AMERICAN: >60 ML/MIN
GFR NON-AFRICAN AMERICAN: 52 ML/MIN
GFR SERPL CREATININE-BSD FRML MDRD: ABNORMAL ML/MIN/{1.73_M2}
GFR SERPL CREATININE-BSD FRML MDRD: ABNORMAL ML/MIN/{1.73_M2}
GLUCOSE BLD-MCNC: 138 MG/DL (ref 65–105)
GLUCOSE BLD-MCNC: 157 MG/DL (ref 70–99)
GLUCOSE BLD-MCNC: 158 MG/DL (ref 65–105)
GLUCOSE BLD-MCNC: 159 MG/DL (ref 65–105)
HAV IGM SER IA-ACNC: NONREACTIVE
HCT VFR BLD CALC: 30 % (ref 36.3–47.1)
HEMOGLOBIN: 8.8 G/DL (ref 11.9–15.1)
HEPATITIS B CORE IGM ANTIBODY: NONREACTIVE
HEPATITIS B SURFACE ANTIGEN: NONREACTIVE
HEPATITIS C ANTIBODY: NONREACTIVE
IMMATURE GRANULOCYTES: 0 %
IRON SATURATION: 18 % (ref 20–55)
IRON: 48 UG/DL (ref 37–145)
KAPPA FREE LIGHT CHAINS QNT: 3.97 MG/DL (ref 0.37–1.94)
LAMBDA FREE LIGHT CHAINS QNT: 2.59 MG/DL (ref 0.57–2.63)
LYMPHOCYTES # BLD: 24 % (ref 24–43)
MCH RBC QN AUTO: 27.3 PG (ref 25.2–33.5)
MCHC RBC AUTO-ENTMCNC: 29.3 G/DL (ref 28.4–34.8)
MCV RBC AUTO: 93.2 FL (ref 82.6–102.9)
MONOCYTES # BLD: 10 % (ref 3–12)
NRBC AUTOMATED: 0 PER 100 WBC
PDW BLD-RTO: 13.5 % (ref 11.8–14.4)
PLATELET # BLD: 266 K/UL (ref 138–453)
PLATELET ESTIMATE: ABNORMAL
PMV BLD AUTO: 10.5 FL (ref 8.1–13.5)
POTASSIUM SERPL-SCNC: 3.6 MMOL/L (ref 3.7–5.3)
RBC # BLD: 3.22 M/UL (ref 3.95–5.11)
RBC # BLD: ABNORMAL 10*6/UL
SEG NEUTROPHILS: 64 % (ref 36–65)
SEGMENTED NEUTROPHILS ABSOLUTE COUNT: 4.27 K/UL (ref 1.5–8.1)
SODIUM BLD-SCNC: 139 MMOL/L (ref 135–144)
TOTAL IRON BINDING CAPACITY: 271 UG/DL (ref 250–450)
UNSATURATED IRON BINDING CAPACITY: 223 UG/DL (ref 112–347)
WBC # BLD: 6.8 K/UL (ref 3.5–11.3)
WBC # BLD: ABNORMAL 10*3/UL

## 2019-05-07 PROCEDURE — 85025 COMPLETE CBC W/AUTO DIFF WBC: CPT

## 2019-05-07 PROCEDURE — 6370000000 HC RX 637 (ALT 250 FOR IP): Performed by: STUDENT IN AN ORGANIZED HEALTH CARE EDUCATION/TRAINING PROGRAM

## 2019-05-07 PROCEDURE — 6370000000 HC RX 637 (ALT 250 FOR IP): Performed by: INTERNAL MEDICINE

## 2019-05-07 PROCEDURE — 70450 CT HEAD/BRAIN W/O DYE: CPT

## 2019-05-07 PROCEDURE — 82947 ASSAY GLUCOSE BLOOD QUANT: CPT

## 2019-05-07 PROCEDURE — 86038 ANTINUCLEAR ANTIBODIES: CPT

## 2019-05-07 PROCEDURE — 86160 COMPLEMENT ANTIGEN: CPT

## 2019-05-07 PROCEDURE — 80074 ACUTE HEPATITIS PANEL: CPT

## 2019-05-07 PROCEDURE — 97530 THERAPEUTIC ACTIVITIES: CPT

## 2019-05-07 PROCEDURE — 84155 ASSAY OF PROTEIN SERUM: CPT

## 2019-05-07 PROCEDURE — 36415 COLL VENOUS BLD VENIPUNCTURE: CPT

## 2019-05-07 PROCEDURE — 76775 US EXAM ABDO BACK WALL LIM: CPT

## 2019-05-07 PROCEDURE — 99232 SBSQ HOSP IP/OBS MODERATE 35: CPT | Performed by: INTERNAL MEDICINE

## 2019-05-07 PROCEDURE — 84165 PROTEIN E-PHORESIS SERUM: CPT

## 2019-05-07 PROCEDURE — 6360000002 HC RX W HCPCS: Performed by: INTERNAL MEDICINE

## 2019-05-07 PROCEDURE — 83550 IRON BINDING TEST: CPT

## 2019-05-07 PROCEDURE — 94761 N-INVAS EAR/PLS OXIMETRY MLT: CPT

## 2019-05-07 PROCEDURE — 86334 IMMUNOFIX E-PHORESIS SERUM: CPT

## 2019-05-07 PROCEDURE — 82728 ASSAY OF FERRITIN: CPT

## 2019-05-07 PROCEDURE — 97161 PT EVAL LOW COMPLEX 20 MIN: CPT

## 2019-05-07 PROCEDURE — 83540 ASSAY OF IRON: CPT

## 2019-05-07 PROCEDURE — 83883 ASSAY NEPHELOMETRY NOT SPEC: CPT

## 2019-05-07 PROCEDURE — 80048 BASIC METABOLIC PNL TOTAL CA: CPT

## 2019-05-07 PROCEDURE — 2700000000 HC OXYGEN THERAPY PER DAY

## 2019-05-07 PROCEDURE — 1200000000 HC SEMI PRIVATE

## 2019-05-07 PROCEDURE — 2580000003 HC RX 258: Performed by: INTERNAL MEDICINE

## 2019-05-07 PROCEDURE — 94640 AIRWAY INHALATION TREATMENT: CPT

## 2019-05-07 RX ORDER — METOLAZONE 2.5 MG/1
2.5 TABLET ORAL DAILY
Status: DISCONTINUED | OUTPATIENT
Start: 2019-05-07 | End: 2019-05-10 | Stop reason: HOSPADM

## 2019-05-07 RX ORDER — SODIUM CHLORIDE 9 MG/ML
INJECTION, SOLUTION INTRAVENOUS CONTINUOUS
Status: DISCONTINUED | OUTPATIENT
Start: 2019-05-07 | End: 2019-05-07

## 2019-05-07 RX ORDER — BUMETANIDE 1 MG/1
2 TABLET ORAL 2 TIMES DAILY
Status: DISCONTINUED | OUTPATIENT
Start: 2019-05-07 | End: 2019-05-07

## 2019-05-07 RX ORDER — SPIRONOLACTONE 25 MG/1
25 TABLET ORAL DAILY
Status: DISCONTINUED | OUTPATIENT
Start: 2019-05-07 | End: 2019-05-10 | Stop reason: HOSPADM

## 2019-05-07 RX ORDER — BUMETANIDE 1 MG/1
2 TABLET ORAL 2 TIMES DAILY
Status: DISCONTINUED | OUTPATIENT
Start: 2019-05-07 | End: 2019-05-10 | Stop reason: HOSPADM

## 2019-05-07 RX ADMIN — METOLAZONE 2.5 MG: 2.5 TABLET ORAL at 16:41

## 2019-05-07 RX ADMIN — OXYCODONE HYDROCHLORIDE AND ACETAMINOPHEN 2 TABLET: 5; 325 TABLET ORAL at 16:41

## 2019-05-07 RX ADMIN — SPIRONOLACTONE 25 MG: 25 TABLET ORAL at 16:41

## 2019-05-07 RX ADMIN — ENOXAPARIN SODIUM 30 MG: 30 INJECTION SUBCUTANEOUS at 21:37

## 2019-05-07 RX ADMIN — ISOSORBIDE DINITRATE 20 MG: 10 TABLET ORAL at 21:28

## 2019-05-07 RX ADMIN — INSULIN LISPRO 1 UNITS: 100 INJECTION, SOLUTION INTRAVENOUS; SUBCUTANEOUS at 21:44

## 2019-05-07 RX ADMIN — GABAPENTIN 800 MG: 800 TABLET ORAL at 21:28

## 2019-05-07 RX ADMIN — GABAPENTIN 800 MG: 800 TABLET ORAL at 16:49

## 2019-05-07 RX ADMIN — BUMETANIDE 2 MG: 1 TABLET ORAL at 16:41

## 2019-05-07 RX ADMIN — GABAPENTIN 800 MG: 800 TABLET ORAL at 09:58

## 2019-05-07 RX ADMIN — FLUTICASONE PROPIONATE 1 SPRAY: 50 SPRAY, METERED NASAL at 09:59

## 2019-05-07 RX ADMIN — Medication 10 ML: at 10:01

## 2019-05-07 RX ADMIN — CETIRIZINE HYDROCHLORIDE 10 MG: 10 TABLET ORAL at 09:58

## 2019-05-07 RX ADMIN — ISOSORBIDE DINITRATE 20 MG: 10 TABLET ORAL at 16:48

## 2019-05-07 RX ADMIN — PANTOPRAZOLE SODIUM 40 MG: 40 TABLET, DELAYED RELEASE ORAL at 06:37

## 2019-05-07 RX ADMIN — Medication 10 ML: at 21:28

## 2019-05-07 RX ADMIN — IPRATROPIUM BROMIDE AND ALBUTEROL SULFATE 3 ML: .5; 3 SOLUTION RESPIRATORY (INHALATION) at 09:51

## 2019-05-07 RX ADMIN — HYDRALAZINE HYDROCHLORIDE 25 MG: 25 TABLET ORAL at 21:29

## 2019-05-07 RX ADMIN — TIOTROPIUM BROMIDE 18 MCG: 18 CAPSULE ORAL; RESPIRATORY (INHALATION) at 09:52

## 2019-05-07 RX ADMIN — OXYCODONE HYDROCHLORIDE AND ACETAMINOPHEN 2 TABLET: 5; 325 TABLET ORAL at 21:48

## 2019-05-07 RX ADMIN — ENOXAPARIN SODIUM 30 MG: 30 INJECTION SUBCUTANEOUS at 09:57

## 2019-05-07 RX ADMIN — OXYCODONE HYDROCHLORIDE AND ACETAMINOPHEN 2 TABLET: 5; 325 TABLET ORAL at 09:58

## 2019-05-07 RX ADMIN — HYDRALAZINE HYDROCHLORIDE 25 MG: 25 TABLET ORAL at 16:49

## 2019-05-07 RX ADMIN — INSULIN LISPRO 1 UNITS: 100 INJECTION, SOLUTION INTRAVENOUS; SUBCUTANEOUS at 11:44

## 2019-05-07 RX ADMIN — HYDRALAZINE HYDROCHLORIDE 25 MG: 25 TABLET ORAL at 07:00

## 2019-05-07 RX ADMIN — ISOSORBIDE DINITRATE 20 MG: 10 TABLET ORAL at 09:59

## 2019-05-07 RX ADMIN — DESMOPRESSIN ACETATE 40 MG: 0.2 TABLET ORAL at 21:28

## 2019-05-07 ASSESSMENT — PAIN DESCRIPTION - FREQUENCY
FREQUENCY: INTERMITTENT
FREQUENCY: CONTINUOUS
FREQUENCY: CONTINUOUS
FREQUENCY: INTERMITTENT

## 2019-05-07 ASSESSMENT — PAIN DESCRIPTION - DESCRIPTORS
DESCRIPTORS: THROBBING
DESCRIPTORS: THROBBING
DESCRIPTORS: ACHING
DESCRIPTORS: ACHING

## 2019-05-07 ASSESSMENT — PAIN DESCRIPTION - ORIENTATION
ORIENTATION: LOWER
ORIENTATION: RIGHT
ORIENTATION: RIGHT
ORIENTATION: RIGHT;LEFT

## 2019-05-07 ASSESSMENT — PAIN SCALES - GENERAL
PAINLEVEL_OUTOF10: 4
PAINLEVEL_OUTOF10: 3
PAINLEVEL_OUTOF10: 0
PAINLEVEL_OUTOF10: 7
PAINLEVEL_OUTOF10: 0
PAINLEVEL_OUTOF10: 7
PAINLEVEL_OUTOF10: 8

## 2019-05-07 ASSESSMENT — PAIN DESCRIPTION - ONSET
ONSET: ON-GOING
ONSET: GRADUAL

## 2019-05-07 ASSESSMENT — PAIN DESCRIPTION - LOCATION
LOCATION: KNEE
LOCATION: GENERALIZED
LOCATION: KNEE;WRIST
LOCATION: BACK

## 2019-05-07 ASSESSMENT — PAIN - FUNCTIONAL ASSESSMENT: PAIN_FUNCTIONAL_ASSESSMENT: PREVENTS OR INTERFERES SOME ACTIVE ACTIVITIES AND ADLS

## 2019-05-07 ASSESSMENT — PAIN DESCRIPTION - PAIN TYPE
TYPE: CHRONIC PAIN
TYPE: ACUTE PAIN
TYPE: CHRONIC PAIN
TYPE: CHRONIC PAIN

## 2019-05-07 ASSESSMENT — PAIN DESCRIPTION - PROGRESSION: CLINICAL_PROGRESSION: NOT CHANGED

## 2019-05-07 NOTE — PROGRESS NOTES
Lower  Pain Descriptors: Aching  Pain Frequency: Continuous  Pain Onset: On-going  Clinical Progression: Not changed  Vital Signs  Patient Currently in Pain: Yes  Pre Treatment Pain Screening  Intervention List: Patient able to continue with treatment    Orientation  Orientation  Overall Orientation Status: Within Normal Limits  Social/Functional History  Social/Functional History  Lives With: Son  Type of Home: House  Home Layout: One level, Able to Live on Main level with bedroom/bathroom  Home Access: Ramped entrance  Bathroom Shower/Tub: Tub/Shower unit  Bathroom Toilet: Bedside commode  Bathroom Equipment: Tub transfer bench  Bathroom Accessibility: Accessible  Home Equipment: Standard walker, Rolling walker, [address] bed  Receives Help From: Family, Home health  ADL Assistance: Needs assistance  Bath: Moderate assistance  Dressing: Moderate assistance  Grooming: Minimal assistance  Toileting: Needs assistance  Homemaking Assistance: Needs assistance  Meal Prep: Maximal  Laundry: Maximal  Vacuuming: Maximal  Cleaning: Maximal  Gardening: Maximal  Yard Work: Maximal  Driving: Maximal  Shopping: Maximal  Homemaking Responsibilities: Yes(HHA and son take care of IADL tasks)  Meal Prep Responsibility: No  Laundry Responsibility: No  Cleaning Responsibility: No  Bill Paying/Finance Responsibility: No  Ambulation Assistance: Independent  Transfer Assistance: Independent  Active : No  Patient's  Info: son   Occupation: On disability  Leisure & Hobbies: watching movies  IADL Comments: HHA and son assist in completing IADL tasks.      Objective  AROM RLE (degrees)  RLE AROM: WFL  AROM LLE (degrees)  LLE AROM : WFL  AROM RUE (degrees)  RUE AROM : WFL  AROM LUE (degrees)  LUE AROM : WFL  Strength RLE  Strength RLE: WFL  Strength LLE  Strength LLE: WFL  Strength RUE  Strength RUE: Exception--shoulder 3+/5--chronic per pt; elbow distal WFL  Strength LUE  Strength LUE: WFL  Tone RLE  RLE Tone: Normotonic  Tone LLE  LLE Tone: Normotonic  Motor Control  Gross Motor?: WFL  Sensation  Overall Sensation Status: WFL  Bed mobility  Rolling to Right: Modified independent(with effort and use of bedrail (has at home))  Supine to Sit: Modified independent(with effort, using bed rail)  Scooting: Contact guard assistance  Transfers  Sit to Stand: Contact guard assistance  Stand to sit: Contact guard assistance  Bed to Chair: Contact guard assistance  Stand Pivot Transfers: Contact guard assistance  Ambulation  Ambulation?: Yes  Ambulation 1  Surface: level tile  Device: Rolling Walker  Assistance: Contact guard assistance  Quality of Gait: wide NOEL, antalgic gait, flexed posture  Distance: ~8 steps bed to BSC; ~16 steps BSC to BS chair  Stairs/Curb  Stairs?: No     Balance  Posture: Fair  Sitting - Static: Good  Sitting - Dynamic: Good  Standing - Static: Good  Standing - Dynamic: Fair  Other exercises  Other exercises 1: ankle pumps; AROM x 4     Plan   Plan  Times per week: 3-5 visits weekly  Times per day: Daily  Current Treatment Recommendations: Strengthening, ROM, Balance Training, Functional Mobility Training, Transfer Training, Endurance Training, Gait Training, Home Exercise Program, Pain Management, Safety Education & Training, Patient/Caregiver Education & Training  Safety Devices  Type of devices: Call light within reach, Patient at risk for falls, Left in chair  Restraints  Initially in place: No           AM-PAC Score     AM-PAC Inpatient Mobility without Stair Climbing Raw Score : 13  AM-PAC Inpatient without Stair Climbing T-Scale Score : 38.96  Mobility Inpatient CMS 0-100% Score: 58.44  Mobility Inpatient without Stair CMS G-Code Modifier : CK       Goals  Short term goals  Time Frame for Short term goals: independent bed mobility using bed rail  Short term goal 1: independent transfers  Short term goal 2: independent gait with rw x 25'   Short term goal 3: prevent loss of strength and independence

## 2019-05-07 NOTE — PROGRESS NOTES
into the lungs every 6 hours as needed 8/31/17  Yes Historical Provider, MD   albuterol (PROVENTIL) (2.5 MG/3ML) 0.083% nebulizer solution Take 3 mLs by nebulization every 6 hours as needed for Wheezing. 9/24/14  Yes Yani Otoole MD   budesonide-formoterol (SYMBICORT) 160-4.5 MCG/ACT AERO Inhale 2 puffs into the lungs 2 times daily. 9/24/14  Yes Jessy Mcintosh MD   nicotine (NICODERM CQ) 14 MG/24HR Place 1 patch onto the skin daily. 9/24/14   Yani Otoole MD       ALLERGIES      Bee venom; Aspirin; Beta adrenergic blockers; and Sulfa antibiotics    SOCIAL HISTORY       reports that she quit smoking about 16 months ago. She has a 15.00 pack-year smoking history. She has never used smokeless tobacco. She reports that she does not drink alcohol or use drugs. FAMILY HISTORY      family history is not on file. REVIEW OF SYSTEMS      · Constitutional: Negative for weight loss  · Eyes: Negative for visual changes, diplopia, scleral icterus. · ENT: Negative for Headaches, hearing loss, vertigo, mouth sores, sore throat. · Cardiovascular: Negative for lightheadedness/orthostatic symptoms ,chest pain, dyspnea on exertion, palpitations or loss of consciousness. · Respiratory: Negative for cough or wheezing, sputum production, hemoptysis, pleuritic pain. · Gastrointestinal: Negative for nausea/vomiting, change in bowel habits, abdominal pain, dysphagia/appetite loss, hematemesis, blood in stools. · Genitourinary:Negative for change in bladder habits, dysuria, trouble voiding, hematuria. · Musculoskeletal: Negative for gait disturbance, weakness, joint complaints. · Integumentary: Negative for rash, pruritis. · Neurological:Positive  headache, dizziness that resolved today  change in muscle strength, numbness/tingling, change in gait, balance, coordination,   · Psychiatric: negative for change in mood, affect, memory, mentation, behavior.   · Endocrine: negative for temperature intolerance, excessive thirst, fluid intake, or urination, tremor. · Hematologic/Lymphatic: negative for abnormal bruising or bleeding, blood clots, swollen lymph nodes. · Allergic/Immunologic: negative for nasal congestion, pruritis, hives. PHYSICAL EXAM      /63   Pulse 74   Temp 98 °F (36.7 °C) (Oral)   Resp 18   Ht 5' (1.524 m)   Wt (!) 385 lb (174.6 kg)   LMP  (LMP Unknown)   SpO2 100%   BMI 75.19 kg/m²      · General appearance: well nourished  · HEENT: Head: Normocephalic, no lesions, without obvious abnormality. · Lungs: clear to auscultation bilaterally  · Heart: regular rate and rhythm, S1, S2 normal, no murmur, click, rub or gallop  · Abdomen: soft, non-tender; bowel sounds normal; no masses,  no organomegaly  · Extremities:Bilateral pitting lower extremity edema 2+ present(at her baseline)  · Neurological: Gait normal. Reflexes normal and symmetric. Sensation grossly normal  · Skin - no rash, no lump   · Eye no icterus no redness  · Psych-normal affect   · NEURO-no limb weakness  No facial droop  · Lymphatic system-no lymphadenopathy no splenomegaly     DIAGNOSTICS      Laboratory Testing:  CBC:   Recent Labs     05/06/19  1139   WBC 8.5   HGB 9.4*        BMP:    Recent Labs     05/06/19  1139      K 3.4*   CL 88*   CO2 32*   BUN 24*   CREATININE 1.22*   GLUCOSE 254*     S. Calcium:  Recent Labs     05/06/19  1139   CALCIUM 8.7     S. Ionized Calcium:No results for input(s): IONCA in the last 72 hours. S. Magnesium:No results for input(s): MG in the last 72 hours. S. Phosphorus:No results for input(s): PHOS in the last 72 hours. S. Glucose:  Recent Labs     05/06/19  1340 05/06/19  1640 05/06/19  2118   POCGLU 167* 137* 155*     Glycosylated hemoglobin A1C:   Recent Labs     05/06/19  1139   LABA1C 6.8*     INR: No results for input(s): INR in the last 72 hours. Hepatic functions: No results for input(s): ALKPHOS, ALT, AST, PROT, BILITOT, BILIDIR, LABALBU in the last 72 hours.   Pancreatic functions:No results for input(s): LACTA, AMYLASE in the last 72 hours. S. Lactic Acid: No results for input(s): LACTA in the last 72 hours. Cardiac enzymes:No results for input(s): CKTOTAL, CKMB, CKMBINDEX, TROPONINI in the last 72 hours. BNP:No results for input(s): BNP in the last 72 hours. Lipid profile: No results for input(s): CHOL, TRIG, HDL, LDLCALC in the last 72 hours. Invalid input(s): LDL  Blood Gases: No results found for: PH, PCO2, PO2, HCO3, O2SAT  Thyroid functions:   Lab Results   Component Value Date    TSH 2.36 02/23/2019        Imaging/Diagonstics:      CXR: No acute cardiopulmonary findings. ASSESSMENT     Patient Active Problem List   Diagnosis    Asthma    COPD (chronic obstructive pulmonary disease) [facility] [address]    Pneumonia    HTN (hypertension)    Torn meniscus    Chest pain    Pulmonary hypertension, moderate to severe ([address]    Morbid obesity with BMI of 60.0-69.9, adult ([facility] [address]    Obesity hypoventilation syndrome ([facility] [address]    H/O tracheostomy    STEPH (obstructive sleep apnea)    Right heart failure, unspecified (HCC)    Acute on chronic diastolic heart failure (HCC)    Acute on chronic congestive heart failure ([address]    Diabetes mellitus, new onset ([facility] [address]    HIEN (acute kidney injury) ([facility] [address]    Dizziness    Normocytic anemia       PLAN      1. Dizziness and lightheadedness secondary to over diuresis-resolved  Orthostatics is negative so far. Will continue hydration. Held diuretics for now and will resume as appropriate. 2.HIEN-resolving  cr 1.22 on presentation(baseline 0.9). f/u BMP today. Will monitor creatinine and start diuretics as appropriate. Monitor urine output. Avoid nephrotoxic drugs. Daily BMP. 3. Chronic respiratory failure-Stable  On 2-3 L of home oxygen. No recent increase in oxygen. 4.  COPD-stable  Continue breathing treatments with spiriva and duoneb. RT aerosol treatment. RT eval and treat. Pulse ox. Continue telemetry.  Oxygen as per protocol. 5.Newly diagnosed diabetes mellitus  Recent HbA1c 6.8.(Last HbA1c 6.3 in February). poct glucose -154. poct glucose checks AC, HS. continued on Insulin sliding scale. Hypoglycemia protocol. Will likely benefit from metformin at the time of discharge. 6.  Pulmonary hypertension  Will restart diuretics once creatinine improves. Recommend Follow-up with nephrology for diuretic adjustment after discharge. 7.  Obstructive sleep apnea  Recommend to use CPAP at night. 8.  Hypertension  Currently blood pressure low normal range. On hydralazine 25 3 times a day and isosorbide 20 3 times a day. Will monitor blood pressure and adjust medications as appropriate. 9.  Normocytic anemia-stable  fe studies suggestive of normocytic anemia. DVT prophylaxis-on Lovenox 30 twice a day  Diet -cardiac diet with fluid restriction. PT/OT following  Case manger following for discharge planning             [address] [facility] MD KENNETH Goddard[address], [address] [facility]. Phone [phone]   Fax: [phone]  Answering Service: [phone]      Attending Physician Statement  I have discussed the care of [address] [place] [address] and I have examined the patient myselft and taken ros and hpi , including pertinent history and exam findings,  with the resident. I have reviewed the key elements of all parts of the encounter with the resident. I agree with the assessment, plan and orders as documented by the resident.     Rule otu nph ct head  hydate  Agree with holding diuretics  fred  Morbid obese  Dc plan in am    Electronically signed by Roz Driscoll MD

## 2019-05-08 ENCOUNTER — APPOINTMENT (OUTPATIENT)
Dept: CT IMAGING | Age: 44
DRG: 420 | End: 2019-05-08
Payer: MEDICARE

## 2019-05-08 LAB
-: ABNORMAL
ALBUMIN (CALCULATED): 4.3 G/DL (ref 3.2–5.2)
ALBUMIN PERCENT: 55 % (ref 45–65)
ALPHA 1 PERCENT: 3 % (ref 3–6)
ALPHA 2 PERCENT: 12 % (ref 6–13)
ALPHA-1-GLOBULIN: 0.2 G/DL (ref 0.1–0.4)
ALPHA-2-GLOBULIN: 0.9 G/DL (ref 0.5–0.9)
AMORPHOUS: ABNORMAL
ANION GAP SERPL CALCULATED.3IONS-SCNC: 15 MMOL/L (ref 9–17)
ANTI-NUCLEAR ANTIBODY (ANA): NEGATIVE
BACTERIA: ABNORMAL
BETA GLOBULIN: 1 G/DL (ref 0.5–1.1)
BETA PERCENT: 12 % (ref 11–19)
BILIRUBIN URINE: NEGATIVE
BUN BLDV-MCNC: 30 MG/DL (ref 6–20)
BUN/CREAT BLD: ABNORMAL (ref 9–20)
CALCIUM SERPL-MCNC: 9.1 MG/DL (ref 8.6–10.4)
CASTS UA: ABNORMAL /LPF (ref 0–8)
CHLORIDE BLD-SCNC: 91 MMOL/L (ref 98–107)
CO2: 33 MMOL/L (ref 20–31)
COLOR: YELLOW
CREAT SERPL-MCNC: 1.19 MG/DL (ref 0.5–0.9)
CREATININE URINE: 78.2 MG/DL (ref 28–217)
CRYSTALS, UA: ABNORMAL /HPF
EPITHELIAL CELLS UA: ABNORMAL /HPF (ref 0–5)
GAMMA GLOBULIN %: 19 % (ref 9–20)
GAMMA GLOBULIN: 1.5 G/DL (ref 0.5–1.5)
GFR AFRICAN AMERICAN: >60 ML/MIN
GFR NON-AFRICAN AMERICAN: 50 ML/MIN
GFR SERPL CREATININE-BSD FRML MDRD: ABNORMAL ML/MIN/{1.73_M2}
GFR SERPL CREATININE-BSD FRML MDRD: ABNORMAL ML/MIN/{1.73_M2}
GLUCOSE BLD-MCNC: 127 MG/DL (ref 65–105)
GLUCOSE BLD-MCNC: 142 MG/DL (ref 65–105)
GLUCOSE BLD-MCNC: 178 MG/DL (ref 70–99)
GLUCOSE BLD-MCNC: 186 MG/DL (ref 65–105)
GLUCOSE BLD-MCNC: 195 MG/DL (ref 65–105)
GLUCOSE URINE: NEGATIVE
KETONES, URINE: NEGATIVE
LEUKOCYTE ESTERASE, URINE: NEGATIVE
MAGNESIUM: 1.7 MG/DL (ref 1.6–2.6)
MUCUS: ABNORMAL
NITRITE, URINE: NEGATIVE
OTHER OBSERVATIONS UA: ABNORMAL
PATHOLOGIST: NORMAL
PATHOLOGIST: NORMAL
PH UA: 6.5 (ref 5–8)
POTASSIUM SERPL-SCNC: 3.5 MMOL/L (ref 3.7–5.3)
PROTEIN ELECTROPHORESIS, SERUM: NORMAL
PROTEIN UA: NEGATIVE
RBC UA: ABNORMAL /HPF (ref 0–4)
RENAL EPITHELIAL, UA: ABNORMAL /HPF
SERUM IFX INTERP: NORMAL
SODIUM BLD-SCNC: 139 MMOL/L (ref 135–144)
SPECIFIC GRAVITY UA: 1.02 (ref 1–1.03)
TOTAL PROT. SUM,%: 101 % (ref 98–102)
TOTAL PROT. SUM: 7.9 G/DL (ref 6.3–8.2)
TOTAL PROTEIN, URINE: 8 MG/DL
TOTAL PROTEIN: 7.9 G/DL (ref 6.4–8.3)
TRICHOMONAS: ABNORMAL
TURBIDITY: CLEAR
URINE HGB: ABNORMAL
URINE TOTAL PROTEIN CREATININE RATIO: 0.1 (ref 0–0.2)
UROBILINOGEN, URINE: NORMAL
WBC UA: ABNORMAL /HPF (ref 0–5)
YEAST: ABNORMAL

## 2019-05-08 PROCEDURE — 2580000003 HC RX 258: Performed by: INTERNAL MEDICINE

## 2019-05-08 PROCEDURE — 6370000000 HC RX 637 (ALT 250 FOR IP): Performed by: INTERNAL MEDICINE

## 2019-05-08 PROCEDURE — 84166 PROTEIN E-PHORESIS/URINE/CSF: CPT

## 2019-05-08 PROCEDURE — 6360000002 HC RX W HCPCS: Performed by: INTERNAL MEDICINE

## 2019-05-08 PROCEDURE — 2700000000 HC OXYGEN THERAPY PER DAY

## 2019-05-08 PROCEDURE — 6370000000 HC RX 637 (ALT 250 FOR IP): Performed by: STUDENT IN AN ORGANIZED HEALTH CARE EDUCATION/TRAINING PROGRAM

## 2019-05-08 PROCEDURE — 6360000004 HC RX CONTRAST MEDICATION: Performed by: STUDENT IN AN ORGANIZED HEALTH CARE EDUCATION/TRAINING PROGRAM

## 2019-05-08 PROCEDURE — 83735 ASSAY OF MAGNESIUM: CPT

## 2019-05-08 PROCEDURE — 94761 N-INVAS EAR/PLS OXIMETRY MLT: CPT

## 2019-05-08 PROCEDURE — 82570 ASSAY OF URINE CREATININE: CPT

## 2019-05-08 PROCEDURE — 99232 SBSQ HOSP IP/OBS MODERATE 35: CPT | Performed by: INTERNAL MEDICINE

## 2019-05-08 PROCEDURE — 80048 BASIC METABOLIC PNL TOTAL CA: CPT

## 2019-05-08 PROCEDURE — 74178 CT ABD&PLV WO CNTR FLWD CNTR: CPT

## 2019-05-08 PROCEDURE — 1200000000 HC SEMI PRIVATE

## 2019-05-08 PROCEDURE — 97530 THERAPEUTIC ACTIVITIES: CPT

## 2019-05-08 PROCEDURE — 82947 ASSAY GLUCOSE BLOOD QUANT: CPT

## 2019-05-08 PROCEDURE — 84156 ASSAY OF PROTEIN URINE: CPT

## 2019-05-08 PROCEDURE — 81001 URINALYSIS AUTO W/SCOPE: CPT

## 2019-05-08 PROCEDURE — 94660 CPAP INITIATION&MGMT: CPT

## 2019-05-08 PROCEDURE — 36415 COLL VENOUS BLD VENIPUNCTURE: CPT

## 2019-05-08 PROCEDURE — 94640 AIRWAY INHALATION TREATMENT: CPT

## 2019-05-08 RX ORDER — BLOOD-GLUCOSE METER
1 KIT MISCELLANEOUS DAILY
Qty: 1 KIT | Refills: 0 | Status: SHIPPED | OUTPATIENT
Start: 2019-05-08

## 2019-05-08 RX ORDER — GLUCOSAMINE HCL/CHONDROITIN SU 500-400 MG
CAPSULE ORAL
Qty: 50 STRIP | Refills: 0 | Status: SHIPPED | OUTPATIENT
Start: 2019-05-08

## 2019-05-08 RX ORDER — LANCETS 30 GAUGE
1 EACH MISCELLANEOUS DAILY
Qty: 100 EACH | Refills: 0 | Status: SHIPPED | OUTPATIENT
Start: 2019-05-08

## 2019-05-08 RX ORDER — BUMETANIDE 2 MG/1
2 TABLET ORAL 2 TIMES DAILY
Qty: 30 TABLET | Refills: 3 | Status: SHIPPED | OUTPATIENT
Start: 2019-05-08 | End: 2019-05-31 | Stop reason: ALTCHOICE

## 2019-05-08 RX ADMIN — FLUTICASONE PROPIONATE 1 SPRAY: 50 SPRAY, METERED NASAL at 08:17

## 2019-05-08 RX ADMIN — HYDRALAZINE HYDROCHLORIDE 25 MG: 25 TABLET ORAL at 14:30

## 2019-05-08 RX ADMIN — ENOXAPARIN SODIUM 30 MG: 30 INJECTION SUBCUTANEOUS at 08:21

## 2019-05-08 RX ADMIN — OXYCODONE HYDROCHLORIDE AND ACETAMINOPHEN 2 TABLET: 5; 325 TABLET ORAL at 08:17

## 2019-05-08 RX ADMIN — IOHEXOL 120 ML: 350 INJECTION, SOLUTION INTRAVENOUS at 17:30

## 2019-05-08 RX ADMIN — INSULIN LISPRO 1 UNITS: 100 INJECTION, SOLUTION INTRAVENOUS; SUBCUTANEOUS at 11:34

## 2019-05-08 RX ADMIN — DESMOPRESSIN ACETATE 40 MG: 0.2 TABLET ORAL at 20:39

## 2019-05-08 RX ADMIN — HYDRALAZINE HYDROCHLORIDE 25 MG: 25 TABLET ORAL at 20:39

## 2019-05-08 RX ADMIN — ISOSORBIDE DINITRATE 20 MG: 10 TABLET ORAL at 08:16

## 2019-05-08 RX ADMIN — CETIRIZINE HYDROCHLORIDE 10 MG: 10 TABLET ORAL at 08:16

## 2019-05-08 RX ADMIN — GABAPENTIN 800 MG: 800 TABLET ORAL at 20:38

## 2019-05-08 RX ADMIN — PANTOPRAZOLE SODIUM 40 MG: 40 TABLET, DELAYED RELEASE ORAL at 07:04

## 2019-05-08 RX ADMIN — HYDRALAZINE HYDROCHLORIDE 25 MG: 25 TABLET ORAL at 08:17

## 2019-05-08 RX ADMIN — ENOXAPARIN SODIUM 30 MG: 30 INJECTION SUBCUTANEOUS at 20:40

## 2019-05-08 RX ADMIN — GABAPENTIN 800 MG: 800 TABLET ORAL at 08:16

## 2019-05-08 RX ADMIN — INSULIN LISPRO 1 UNITS: 100 INJECTION, SOLUTION INTRAVENOUS; SUBCUTANEOUS at 21:00

## 2019-05-08 RX ADMIN — Medication 10 ML: at 08:22

## 2019-05-08 RX ADMIN — Medication 10 ML: at 20:41

## 2019-05-08 RX ADMIN — TIOTROPIUM BROMIDE 18 MCG: 18 CAPSULE ORAL; RESPIRATORY (INHALATION) at 09:49

## 2019-05-08 RX ADMIN — OXYCODONE HYDROCHLORIDE AND ACETAMINOPHEN 2 TABLET: 5; 325 TABLET ORAL at 14:30

## 2019-05-08 RX ADMIN — ISOSORBIDE DINITRATE 20 MG: 10 TABLET ORAL at 14:30

## 2019-05-08 RX ADMIN — OXYCODONE HYDROCHLORIDE AND ACETAMINOPHEN 2 TABLET: 5; 325 TABLET ORAL at 20:50

## 2019-05-08 RX ADMIN — METOLAZONE 2.5 MG: 2.5 TABLET ORAL at 08:16

## 2019-05-08 RX ADMIN — ISOSORBIDE DINITRATE 20 MG: 10 TABLET ORAL at 20:38

## 2019-05-08 RX ADMIN — GABAPENTIN 800 MG: 800 TABLET ORAL at 14:30

## 2019-05-08 RX ADMIN — BUMETANIDE 2 MG: 1 TABLET ORAL at 08:16

## 2019-05-08 RX ADMIN — SPIRONOLACTONE 25 MG: 25 TABLET ORAL at 08:16

## 2019-05-08 RX ADMIN — BUMETANIDE 2 MG: 1 TABLET ORAL at 20:40

## 2019-05-08 ASSESSMENT — PAIN SCALES - GENERAL
PAINLEVEL_OUTOF10: 8
PAINLEVEL_OUTOF10: 6
PAINLEVEL_OUTOF10: 8
PAINLEVEL_OUTOF10: 7

## 2019-05-08 ASSESSMENT — PAIN DESCRIPTION - ORIENTATION: ORIENTATION: MID

## 2019-05-08 ASSESSMENT — PAIN DESCRIPTION - DESCRIPTORS: DESCRIPTORS: ACHING

## 2019-05-08 ASSESSMENT — PAIN DESCRIPTION - FREQUENCY: FREQUENCY: CONTINUOUS

## 2019-05-08 ASSESSMENT — PAIN DESCRIPTION - PAIN TYPE
TYPE: ACUTE PAIN
TYPE: ACUTE PAIN

## 2019-05-08 ASSESSMENT — PAIN DESCRIPTION - LOCATION
LOCATION: HEAD;KNEE
LOCATION: GENERALIZED

## 2019-05-08 NOTE — PROGRESS NOTES
Renal Progress Note    Patient :  Alena Salazar; 37 y.o. MRN# [id number]  Location:  0321/0321-02  Attending:  Job Scheuermann, MD  Admit Date:  5/6/2019   Hospital Day: 2      Subjective:     Patient was seen and examined. No new issues reported. Patient's laboratory swelling is improving. No new labs present yet today. Blood pressure stable. Serology 5/7/19: LAUREN, complement levels and hepatitis panel all negative. K/L 1.53, SPEP and IFX pending. UPC pending. UA Neg protein, RBC 10-20. Patient to get CT program by urology and further workup outpatient. Outpatient Medications:     Medications Prior to Admission: oxyCODONE-acetaminophen (PERCOCET)  MG per tablet, Take 1 tablet by mouth every 6 hours as needed for Pain.  metolazone (ZAROXOLYN) 2.5 MG tablet, Take 2.5 mg by mouth daily  isosorbide dinitrate (ISORDIL) 20 MG tablet, Take 20 mg by mouth 3 times daily  spironolactone (ALDACTONE) 25 MG tablet, Take 25 mg by mouth daily  pantoprazole sodium (PROTONIX) 40 MG PACK packet, Take 40 mg by mouth every morning (before breakfast)  atorvastatin (LIPITOR) 40 MG tablet, Take 40 mg by mouth nightly  ferrous sulfate 325 (65 Fe) MG EC tablet, Take 325 g by mouth 2 times daily (with meals)  fluticasone (FLONASE) 50 MCG/ACT nasal spray, 1 spray by Nasal route daily  gabapentin (NEURONTIN) 600 MG tablet, Take 800 mg by mouth 3 times daily.    hydrALAZINE (APRESOLINE) 25 MG tablet, Take 75 mg by mouth daily  ipratropium-albuterol (DUONEB) 0.5-2.5 (3) MG/3ML SOLN nebulizer solution, Inhale 3 mLs into the lungs every 6 hours as needed  loratadine (CLARITIN) 10 MG tablet, Take 10 mg by mouth daily  Magnesium Oxide (MAG-200) 200 MG TABS, Take 400 mg by mouth 2 times daily  ondansetron (ZOFRAN-ODT) 4 MG disintegrating tablet, Take 4 mg by mouth every 8 hours as needed  tiotropium (SPIRIVA) 18 MCG inhalation capsule, Inhale 1 capsule into the lungs  sertraline (ZOLOFT) 100 MG tablet, Take 100 mg by mouth daily  albuterol sulfate  (90 Base) MCG/ACT inhaler, Inhale 2 puffs into the lungs every 6 hours as needed  albuterol (PROVENTIL) (2.5 MG/3ML) 0.083% nebulizer solution, Take 3 mLs by nebulization every 6 hours as needed for Wheezing. budesonide-formoterol (SYMBICORT) 160-4.5 MCG/ACT AERO, Inhale 2 puffs into the lungs 2 times daily. [DISCONTINUED] furosemide (LASIX) 40 MG tablet, Take 1 tablet by mouth 2 times daily  nicotine (NICODERM CQ) 14 MG/24HR, Place 1 patch onto the skin daily. Current Medications:     Scheduled Meds:    metolazone  2.5 mg Oral Daily    spironolactone  25 mg Oral Daily    bumetanide  2 mg Oral BID    atorvastatin  40 mg Oral Nightly    fluticasone  1 spray Nasal Daily    isosorbide dinitrate  20 mg Oral TID    tiotropium  1 capsule Inhalation Daily    sodium chloride flush  10 mL Intravenous 2 times per day    enoxaparin  30 mg Subcutaneous BID    cetirizine  10 mg Oral Daily    pantoprazole  40 mg Oral QAM AC    hydrALAZINE  25 mg Oral 3 times per day    gabapentin  800 mg Oral TID    insulin lispro  0-6 Units Subcutaneous TID WC    insulin lispro  0-3 Units Subcutaneous Nightly     Continuous Infusions:    dextrose       PRN Meds:  ipratropium-albuterol, sodium chloride flush, magnesium hydroxide, ondansetron, acetaminophen, oxyCODONE-acetaminophen, glucose, dextrose, glucagon (rDNA), dextrose    Input/Output:       I/O last 3 completed shifts:   In: 56 [P.O.:490]  Out: - .      Patient Vitals for the past 96 hrs (Last 3 readings):   Weight   19 1137 (!) 385 lb (174.6 kg)       Vital Signs:   Temperature:  Temp: 98.2 °F (36.8 °C)  TMax:   Temp (24hrs), Av.2 °F (36.8 °C), Min:98.2 °F (36.8 °C), Max:98.2 °F (36.8 °C)    Respirations:  Resp: 20  Pulse:   Pulse: 76  BP:    BP: (!) 105/57  BP Range: Systolic (12UXC), ASS:497 , Min:105 , PAMELA:032       Diastolic (04GVQ), UNT:20, Min:57, Max:76      Physical Examination:     General:  AAO x 3, speaking in full sentences, no accessory muscle use. HEENT: Atraumatic, normocephalic, no throat congestion, moist mucosa. Eyes:   Pupils equal, round and reactive to light, EOMI. Neck:   Supple  Chest:   Bilateral vesicular breath sounds, no rales or wheezes. Cardiac:  S1 S2 RR, no murmurs, gallops or rubs, JVP not raised. Abdomen: Soft, obese, non-tender, no masses or organomegaly, BS audible. :   No suprapubic or flank tenderness. Neuro:  AAO x 3, No FND. SKIN:  No rashes, good skin turgor.   Extremities:  2-3 + edema- improving     Labs:       Recent Labs     05/06/19  1139 05/07/19  0850   WBC 8.5 6.8   RBC 3.50* 3.22*   HGB 9.4* 8.8*   HCT 32.2* 30.0*   MCV 92.0 93.2   MCH 26.9 27.3   MCHC 29.2 29.3   RDW 13.8 13.5    266   MPV 9.9 10.5      BMP:   Recent Labs     05/06/19  1139 05/07/19  0850    139   K 3.4* 3.6*   CL 88* 91*   CO2 32* 33*   BUN 24* 26*   CREATININE 1.22* 1.14*   GLUCOSE 254* 157*   CALCIUM 8.7 8.4*      LAUREN:      Lab Results   Component Value Date    LAUREN NEGATIVE 05/07/2019     SPEP:  Lab Results   Component Value Date    PROT 7.9 05/07/2019    ALBCAL PENDING 05/07/2019    ALBPCT PENDING 05/07/2019    LABALPH PENDING 05/07/2019    LABALPH PENDING 05/07/2019    A1PCT PENDING 05/07/2019    A2PCT PENDING 05/07/2019    LABBETA PENDING 05/07/2019    BETAPCT PENDING 05/07/2019    GAMGLOB PENDING 05/07/2019    GGPCT PENDING 05/07/2019    PATH PENDING 05/07/2019    PATH PENDING 05/07/2019     C3:     Lab Results   Component Value Date    C3 211 05/07/2019     C4:     Lab Results   Component Value Date    C4 48 05/07/2019     Hep BsAg:         Lab Results   Component Value Date    HEPBSAG NONREACTIVE 05/07/2019     Hep C AB:          Lab Results   Component Value Date    HEPCAB NONREACTIVE 05/07/2019       Urinalysis/Chemistries:      Lab Results   Component Value Date    NITRU NEGATIVE 05/06/2019    COLORU YELLOW 05/06/2019    PHUR 6.5 05/06/2019    WBCUA 2 TO 5 05/06/2019    RBCUA 10 TO 20 05/06/2019    MUCUS NOT REPORTED 05/06/2019    TRICHOMONAS NOT REPORTED 05/06/2019    YEAST NOT REPORTED 05/06/2019    BACTERIA NOT REPORTED 05/06/2019    SPECGRAV 1.016 05/06/2019    LEUKOCYTESUR NEGATIVE 05/06/2019    UROBILINOGEN Normal 05/06/2019    BILIRUBINUR NEGATIVE 05/06/2019    GLUCOSEU NEGATIVE 05/06/2019    [address] NEGATIVE 05/06/2019    AMORPHOUS NOT REPORTED 05/06/2019       Radiology:     Reviewed. Assessment:     1. Acute Kidney Injury nonoliguric likely secondary to fluid overload, improving. 2.  Lower extremity edema bilateral- improving. 3.  Hypokalemia. 4.  Morbid obesity  5. Hyperglycemia/new onset diabetes  6. Anemia   7. Moderate to severe pulmonary hypertension  8. Obstructive sleep apnea  9. Right heart failure. 10.  History of cardiac arrest.  11.  COPD. 12.  Hypertension since long time. 13.  Likely CKD 2 due to hypertension, diabetes, complicated by sleep apnea, obesity history of heart failure. 14. Hematuria - Urology on board    Plan:   1. Continue Bumex 2 mg twice a day, Aldactone 25 mg daily and metolazone 2.5 mg daily. 2.  Monitor strict I's and O's and renal function. 3.  Patient with CT urogram during this admission and then further workup per urology as outpatient. 4.  No labs since a.m. BMP was ordered in the morning but has not resulted. I called the lab and also the phlebotomist and reordered a BMP again stat. Patient's nurse and charge nurse/supervisor also updated and will follow through. A great deal of time was spent in doing all that. 5. Will follow. Nutrition   Please ensure that patient is on a renal diet/TF. Avoid nephrotoxic drugs/contrast exposure. We will continue to follow along with you. Robi Nevarez MD  [facility]     This note is created with the assistance of a speech-recognition program. While intending to generate a document that actually reflects the content of the visit, no guarantees can be

## 2019-05-08 NOTE — PROGRESS NOTES
[address] [facility].    PROGRESS NOTE             Date:   5/8/2019  Patient name:  Shahrzad Fraser  Date of admission:  5/6/2019 11:19 AM  MRN:   [id number]  YOB: 1975    CHIEF COMPLAINT     History Obtained From:  Patient and chart review. HISTORY OF PRESENT ILLNESS      The patient is a 37 y.o.  female who is admitted to the hospital for dizziness and lightheadedness and found to have hyperglycemia with blood sugar 424. On presentation she also had HIEN with creatinine of 1.22(baseline 0.9). She was on multiple diuretics at home(managed by her cardiologist) and was scheduled to follow-up with nephrology for diuretic adjustment. Orthostatics were negative, diuretics were held. Her new HbA1c is 6.8(6.3 in feb). Will monitor creatinine and adjust diuretics as appropriate. Current evaluation:5/8/19  She is hemodynamically stable with blood pressure low normal range, afebrile this morning saturating well on 2.5 L of oxygen(home oxygen 2-3L)via nasal cannula spo2-98%. She denied any dizziness, lightheadedness or any other acute issues overnight. Her creatinine this morning improved to 1.14(1.22 on admission). She was seen by nephrology yesterday and started on Bumex 2 mg twice a day, metolazone 2.5 mg daily and Aldactone 25 daily. Urology was consulted by nephrology for concerns of microscopic hematuria on urinalysis and they recommended outpatient cystoscopy and CT urogram.  Her blood sugars(140-150 overnight) have been controlled on  insulin sliding scale. No other acute issues overnight. Patient is stable to be discharged home.       PAST MEDICAL HISTORY       has a past medical history of HIEN (acute kidney injury) ([address] Asthma, CHF, COPD, Diabetes mellitus, new onset ([address] HTN, Hyperlipidemia, Morbid obesity with BMI of 60.0-69.9, adult ([address] Neuromuscular disorder ([address] Pneumonia, Pulmonary hypertension, moderate to severe ([facility] and Torn meniscus. PAST SURGICAL HISTORY       has a past surgical history that includes  section (); Abdomen surgery; and joint replacement. HOME MEDICATIONS        Prior to Admission medications    Medication Sig Start Date End Date Taking? Authorizing Provider   oxyCODONE-acetaminophen (PERCOCET)  MG per tablet Take 1 tablet by mouth every 6 hours as needed for Pain. Yes Historical Provider, MD   furosemide (LASIX) 40 MG tablet Take 1 tablet by mouth 2 times daily 19  Yes Oj Hwang MD   metolazone (ZAROXOLYN) 2.5 MG tablet Take 2.5 mg by mouth daily   Yes Historical Provider, MD   isosorbide dinitrate (ISORDIL) 20 MG tablet Take 20 mg by mouth 3 times daily   Yes Historical Provider, MD   spironolactone (ALDACTONE) 25 MG tablet Take 25 mg by mouth daily   Yes Historical Provider, MD   pantoprazole sodium (PROTONIX) 40 MG PACK packet Take 40 mg by mouth every morning (before breakfast)   Yes Historical Provider, MD   atorvastatin (LIPITOR) 40 MG tablet Take 40 mg by mouth nightly   Yes Historical Provider, MD   ferrous sulfate 325 (65 Fe) MG EC tablet Take 325 g by mouth 2 times daily (with meals) 18  Yes Historical Provider, MD   fluticasone (FLONASE) 50 MCG/ACT nasal spray 1 spray by Nasal route daily   Yes Historical Provider, MD   gabapentin (NEURONTIN) 600 MG tablet Take 800 mg by mouth 3 times daily.     Yes Historical Provider, MD   hydrALAZINE (APRESOLINE) 25 MG tablet Take 75 mg by mouth daily   Yes Historical Provider, MD   ipratropium-albuterol (DUONEB) 0.5-2.5 (3) MG/3ML SOLN nebulizer solution Inhale 3 mLs into the lungs every 6 hours as needed   Yes Historical Provider, MD   loratadine (CLARITIN) 10 MG tablet Take 10 mg by mouth daily   Yes Historical Provider, MD   Magnesium Oxide (MAG-200) 200 MG TABS Take 400 mg by mouth 2 times daily   Yes Historical Provider, MD   ondansetron (ZOFRAN-ODT) 4 MG disintegrating tablet Take 4 mg by mouth every 8 hours as needed   Yes Historical Provider, MD   tiotropium (SPIRIVA) 18 MCG inhalation capsule Inhale 1 capsule into the lungs 8/31/17  Yes Historical Provider, MD   sertraline (ZOLOFT) 100 MG tablet Take 100 mg by mouth daily   Yes Historical Provider, MD   albuterol sulfate  (90 Base) MCG/ACT inhaler Inhale 2 puffs into the lungs every 6 hours as needed 8/31/17  Yes Historical Provider, MD   albuterol (PROVENTIL) (2.5 MG/3ML) 0.083% nebulizer solution Take 3 mLs by nebulization every 6 hours as needed for Wheezing. 9/24/14  Yes Shannon Barnes MD   budesonide-formoterol (SYMBICORT) 160-4.5 MCG/ACT AERO Inhale 2 puffs into the lungs 2 times daily. 9/24/14  Yes Young Duane, MD   nicotine (NICODERM CQ) 14 MG/24HR Place 1 patch onto the skin daily. 9/24/14   Shannon Barnes MD       ALLERGIES      Bee venom; Aspirin; Beta adrenergic blockers; and Sulfa antibiotics    SOCIAL HISTORY       reports that she quit smoking about 16 months ago. She has a 15.00 pack-year smoking history. She has never used smokeless tobacco. She reports that she does not drink alcohol or use drugs. FAMILY HISTORY      family history is not on file. REVIEW OF SYSTEMS      · Constitutional: Negative for weight loss  · Eyes: Negative for visual changes, diplopia, scleral icterus. · ENT: Negative for Headaches, hearing loss, vertigo, mouth sores, sore throat. · Cardiovascular: Negative for lightheadedness/orthostatic symptoms ,chest pain, dyspnea on exertion, palpitations or loss of consciousness. · Respiratory: Negative for cough or wheezing, sputum production, hemoptysis, pleuritic pain. · Gastrointestinal: Negative for nausea/vomiting, change in bowel habits, abdominal pain, dysphagia/appetite loss, hematemesis, blood in stools. · Genitourinary:Negative for change in bladder habits, dysuria, trouble voiding, hematuria.   · Musculoskeletal: Negative for gait disturbance, weakness, joint complaints. · Integumentary: Negative for rash, pruritis. · Neurological:Positive  headache, dizziness that resolved today  change in muscle strength, numbness/tingling, change in gait, balance, coordination,   · Psychiatric: negative for change in mood, affect, memory, mentation, behavior. · Endocrine: negative for temperature intolerance, excessive thirst, fluid intake, or urination, tremor. · Hematologic/Lymphatic: negative for abnormal bruising or bleeding, blood clots, swollen lymph nodes. · Allergic/Immunologic: negative for nasal congestion, pruritis, hives. PHYSICAL EXAM      BP (!) 105/57   Pulse 76   Temp 98.2 °F (36.8 °C) (Oral)   Resp 18   Ht 5' (1.524 m)   Wt (!) 385 lb (174.6 kg)   LMP  (LMP Unknown)   SpO2 98%   BMI 75.19 kg/m²      · General appearance: well nourished  · HEENT: Head: Normocephalic, no lesions, without obvious abnormality. · Lungs: clear to auscultation bilaterally  · Heart: regular rate and rhythm, S1, S2 normal, no murmur, click, rub or gallop  · Abdomen: soft, non-tender; bowel sounds normal; no masses,  no organomegaly  · Extremities:Bilateral pitting lower extremity edema 2+ present(at her baseline)  · Neurological: Gait normal. Reflexes normal and symmetric. Sensation grossly normal  · Skin - no rash, no lump   · Eye no icterus no redness  · Psych-normal affect   · NEURO-no limb weakness  No facial droop  · Lymphatic system-no lymphadenopathy no splenomegaly     DIAGNOSTICS      Laboratory Testing:  CBC:   Recent Labs     05/07/19  0850   WBC 6.8   HGB 8.8*        BMP:    Recent Labs     05/06/19  1139 05/07/19  0850    139   K 3.4* 3.6*   CL 88* 91*   CO2 32* 33*   BUN 24* 26*   CREATININE 1.22* 1.14*   GLUCOSE 254* 157*     S. Calcium:  Recent Labs     05/07/19  0850   CALCIUM 8.4*     S. Ionized Calcium:No results for input(s): IONCA in the last 72 hours. S. Magnesium:No results for input(s): MG in the last 72 hours.   S. Phosphorus:No results for input(s): PHOS in the last 72 hours. S. Glucose:  Recent Labs     05/07/19  1724 05/07/19  2131 05/08/19  0755   POCGLU 138* 159* 142*     Glycosylated hemoglobin A1C:   Recent Labs     05/06/19  1139   LABA1C 6.8*     INR: No results for input(s): INR in the last 72 hours. Hepatic functions:   Recent Labs     05/07/19  1633   PROT 7.9     Pancreatic functions:No results for input(s): LACTA, AMYLASE in the last 72 hours. S. Lactic Acid: No results for input(s): LACTA in the last 72 hours. Cardiac enzymes:No results for input(s): CKTOTAL, CKMB, CKMBINDEX, TROPONINI in the last 72 hours. BNP:No results for input(s): BNP in the last 72 hours. Lipid profile: No results for input(s): CHOL, TRIG, HDL, LDLCALC in the last 72 hours. Invalid input(s): LDL  Blood Gases: No results found for: PH, PCO2, PO2, HCO3, O2SAT  Thyroid functions:   Lab Results   Component Value Date    TSH 2.36 02/23/2019        Imaging/Diagonstics:      CXR: No acute cardiopulmonary findings. ASSESSMENT     Patient Active Problem List   Diagnosis    Asthma    COPD (chronic obstructive pulmonary disease) ([address]    Pneumonia    HTN (hypertension)    Torn meniscus    Chest pain    Pulmonary hypertension, moderate to severe ([address]    Morbid obesity with BMI of 60.0-69.9, adult ([address]    Obesity hypoventilation syndrome ([address]    H/O tracheostomy    STEPH (obstructive sleep apnea)    Right heart failure, unspecified (HCC)    Acute on chronic diastolic heart failure (HCC)    Acute on chronic congestive heart failure ([address]    Diabetes mellitus, new onset ([address]    HIEN (acute kidney injury) ([address]    Dizziness    Normocytic anemia       PLAN      1. Dizziness and lightheadedness secondary to over diuresis-resolved  Orthostatics is negative so far. Nephrology following and they resumed her on Bumex 2 mg twice a day, Aldactone 25 mg daily and metolazone 2.5 mg daily. 2.HIEN-resolving  cr 1.22 on presentation(baseline 0.9). Creatinine today 1.14. Nephrology following and they resumed her on Bumex 2 mg twice a day, Aldactone 25 mg daily and metolazone 2.5 mg daily. Will monitor creatinine and urine output. Avoid nephrotoxic drugs. Daily BMP. 3.  Microscopic hematuria-stable  Urinalysis (5/6/19) showed 10-20 rbc /hpf with neg nit/LE and 2-5 wbc/ hpf. Renal ultrasound (5/7/19) reviewed: no hydronephrosis, stones, or masses. Urology was consulted by nephrology and they recommended outpatient cystoscopy and CT urogram for now. 4.Chronic respiratory failure-Stable  On 2-3 L of home oxygen. No recent increase in oxygen. 5.COPD-stable  Continue breathing treatments with spiriva and duoneb. RT aerosol treatment. RT eval and treat. Pulse ox. Continue telemetry. Oxygen as per protocol. 6..Newly diagnosed diabetes mellitus  Recent HbA1c 6.8.(Last HbA1c 6.3 in February). poct glucose -154. Overnight blood sugars in 140-150. poct glucose checks AC, HS. continued on Insulin sliding scale. Hypoglycemia protocol. 6.  Pulmonary hypertension-stable. 7.  Obstructive sleep apnea  Recommend to use CPAP at night. 8.  Hypertension  Currently blood pressure low normal range. On hydralazine 25 3 times a day and isosorbide 20 3 times a day. Nephrology following and they resumed her on Bumex 2 mg twice a day, Aldactone 25 mg daily and metolazone 2.5 mg daily. 9.  Normocytic anemia-stable  fe studies suggestive of normocytic anemia. DVT prophylaxis-on Lovenox 30 twice a day  Diet -cardiac diet with fluid restriction. PT/OT following  Case manger following for discharge planning-likely d/c home today. [address] [facility] [address], [address] [place] [address].    Phone [phone]   Fax: [phone]  Answering Service: [phone]    Attending Physician Statement  I have discussed the care of Rosio Velásquez and I have examined the patient myselft and taken ros and hpi , including pertinent history and exam findings,  with the resident. I have reviewed the key elements of all parts of the encounter with the resident. I agree with the assessment, plan and orders as documented by the resident.       Electronically signed by Twan Churchill MD

## 2019-05-08 NOTE — CONSULTS
Renal Consult Note    Patient :  Ramy Yanez; 37 y.o. MRN# [id number]  Location:  8921/8880-51  Attending:  Carlos Rutherford MD  Admit Date:  5/6/2019   Hospital Day: 1    Reason for Consult:     Asked by Dr Carlos Rutherford MD to see for HIEN/Elevated Creatinine. History Obtained From:     patient, electronic medical record    History of Present Illness:     Ramy Yanez; 37 y.o. female with past medical history of COPD, hypertension, pulmonary hypertension moderate to severe, morbid obesity, sleep apnea, right heart failure, cardiac arrest, lower extremity edema presented due to dizziness. Patient stated that she was having lightheadedness and dizziness on the morning of admission and got worse patient was found to have blood glucose of 454 by EMS and was sent to the hospital.  Patient was diagnosed with new onset diabetes. Patient stated that she follows up with pulmonology and recently got sleep study done and qualified for CPAP, she is also on home oxygen. She mentioned positive weight gain and resistance to diuretics. Patient was taking Lasix 40 mg per day, also was taking torsemide 20 mg per patient, Aldactone 25 mg daily and metolazone 2.5 mg daily. Patient's last 2-D echo in February 2019 showed EF of 75%, moderately dilated right ventricle and severely decreased systolic function. Patient does not mention any previous renal problems on history of renal stones. She does not mention taking any NSAIDs at home. Patient was going to see Dr. Vasile Jarrett tomorrow in clinic but got admitted in the hospital.  Baseline creatinine seems to be on 0.9-1 MG/DL. Her creatinine on admission was 1.2 to which improved to 1.14 MG/DL today. No history of recent contrast exposure, No h/o prolonged NSAIDs use in the past, No h/o nephrolithiasis, No recent skin rashes or arthralgias, No hematuria or pyuria noticed in the recent past. Doesn't report any reduction in the urine output recently.  Non report of any obstructive urinary symptoms (urgency, frequency, weak stream, straining while urination). No h/o recurrent UTIs in the past.    Past History/Allergies? Social History:     Past Medical History:   Diagnosis Date    HIEN (acute [facility]) ([facility] 2019    Asthma     CHF     COPD     Diabetes mellitus, new onset ([facility] 2019    HTN     Hyperlipidemia     Morbid obesity with BMI of 60.0-69.9, adult ([facility]     Neuromuscular disorder (HCC)     Neuropathy Right hand    Pneumonia     Pulmonary hypertension, moderate to severe (HCC) 2018    Torn meniscus        Allergies   Allergen Reactions    Bee Venom Anaphylaxis     Last bee sting when patient was at 11years of age   [facility] Aspirin     Beta Adrenergic Blockers     Sulfa Antibiotics        Social History     Socioeconomic History    Marital status: Single     Spouse name: Not on file    Number of children: Not on file    Years of education: Not on file    Highest education level: Not on file   Occupational History    Not on file   Social Needs    Financial resource strain: Not on file    Food insecurity:     Worry: Not on file     Inability: Not on file    Transportation needs:     Medical: Not on file     Non-medical: Not on file   Tobacco Use    Smoking status: Former Smoker     Packs/day: 1.00     Years: 15.00     Pack years: 15.00     Last attempt to quit: 2018     Years since quittin.3    Smokeless tobacco: Never Used   Substance and Sexual Activity    Alcohol use: No    Drug use: No    Sexual activity: Yes   Lifestyle    Physical activity:     Days per week: Not on file     Minutes per session: Not on file    Stress: Not on file   Relationships    Social connections:     Talks on phone: Not on file     Gets together: Not on file     Attends [employer] service: Not on file     Active member of club or organization: Not on file     Attends meetings of clubs or organizations: Not on file     Relationship status: Not on file    Intimate partner violence:     Fear of current or ex partner: Not on file     Emotionally abused: Not on file     Physically abused: Not on file     Forced sexual activity: Not on file   Other Topics Concern    Not on file   Social History Narrative    Not on file       Family History:      History reviewed. No pertinent family history. Outpatient Medications:     Medications Prior to Admission: oxyCODONE-acetaminophen (PERCOCET)  MG per tablet, Take 1 tablet by mouth every 6 hours as needed for Pain. furosemide (LASIX) 40 MG tablet, Take 1 tablet by mouth 2 times daily  metolazone (ZAROXOLYN) 2.5 MG tablet, Take 2.5 mg by mouth daily  isosorbide dinitrate (ISORDIL) 20 MG tablet, Take 20 mg by mouth 3 times daily  spironolactone (ALDACTONE) 25 MG tablet, Take 25 mg by mouth daily  pantoprazole sodium (PROTONIX) 40 MG PACK packet, Take 40 mg by mouth every morning (before breakfast)  atorvastatin (LIPITOR) 40 MG tablet, Take 40 mg by mouth nightly  ferrous sulfate 325 (65 Fe) MG EC tablet, Take 325 g by mouth 2 times daily (with meals)  fluticasone (FLONASE) 50 MCG/ACT nasal spray, 1 spray by Nasal route daily  gabapentin (NEURONTIN) 600 MG tablet, Take 800 mg by mouth 3 times daily.    hydrALAZINE (APRESOLINE) 25 MG tablet, Take 75 mg by mouth daily  ipratropium-albuterol (DUONEB) 0.5-2.5 (3) MG/3ML SOLN nebulizer solution, Inhale 3 mLs into the lungs every 6 hours as needed  loratadine (CLARITIN) 10 MG tablet, Take 10 mg by mouth daily  Magnesium Oxide (MAG-200) 200 MG TABS, Take 400 mg by mouth 2 times daily  ondansetron (ZOFRAN-ODT) 4 MG disintegrating tablet, Take 4 mg by mouth every 8 hours as needed  tiotropium (SPIRIVA) 18 MCG inhalation capsule, Inhale 1 capsule into the lungs  sertraline (ZOLOFT) 100 MG tablet, Take 100 mg by mouth daily  albuterol sulfate  (90 Base) MCG/ACT inhaler, Inhale 2 puffs into the lungs every 6 hours as needed  albuterol (PROVENTIL) (2.5 MG/3ML) 0.083% nebulizer solution, likely secondary to fluid overload. 2.  Lower extremity edema bilateral.  3.  Hypokalemia. 4.  Morbid obesity  5. Hyperglycemia/new onset diabetes  6. Anemia   7. Moderate to severe pulmonary hypertension  8. Obstructive sleep apnea  9. Right heart failure. 10.  History of cardiac arrest.  11.  COPD. 12.  Hypertension since long time. 13.  Likely CKD 2 due to hypertension, diabetes, complicated by sleep apnea, obesity history of heart failure. 14. Hematuria      Plan:   1. Change diuretics to Bumex 2 mg twice a day. 2.  Restart Aldactone and metolazone. 3. Will Check Renal Ultrasound to r/o element of obstruction and to assess the kidney size/echotexture. 4. Comprehensive urine testing including Urinalysis, Urine sodium, potassium, chloride, Urine protein and creatinine to quantify the proteinuria if any at all. Will check urinary eosinophils as well. 5. Will Order serum and urine protein electrophoresis to r/o element to occult paraprotein disease. 6. Will order Hepatitis B and C, LAUREN, Complement levels. 7.  Monitor strict I's and O's and renal function. 8.  Needs good compliance with CPAP. 9.  BMP in a.m.  10.  Urology consult due to long standing hematuria. 11. Will follow. Nutrition   Please ensure that patient is on a renal diet/TF. Avoid nephrotoxic drugs/contrast exposure. Thank you for the consultation. Please do not hesitate to contact us for any further questions/concerns. We will continue to follow along with you. Robi Perez MD  [facility]

## 2019-05-08 NOTE — PROGRESS NOTES
Occupational Therapy Not Seen Note    DATE: 2019  Name: Kd Mayo  : 1975  MRN: [id number]    Patient not available for Occupational Therapy due to: Other: pt being d/c this date.  check back tomorrow if pt still admitted     Next Scheduled Treatment:     Electronically signed by ADDISON Johnson on 2019 at 2:26 PM

## 2019-05-08 NOTE — PROGRESS NOTES
Physical Therapy  Facility/Department: [facility] RENAL//MED SURG  Daily Treatment Note  NAME: Gerardo Stevenson  : 1975  MRN: [id number]    Date of Service: 2019    Discharge Recommendations:  Further therapy recommended at discharge. Patient Diagnosis(es): The encounter diagnosis was Diabetes mellitus, new onset ([facility] has a past medical history of HIEN (acute kidney injury) ([facility] Asthma, CHF, COPD, Diabetes mellitus, new onset ([address] HTN, Hyperlipidemia, Morbid obesity with BMI of 60.0-69.9, adult ([address] Neuromuscular disorder ([facility] Pneumonia, Pulmonary hypertension, moderate to severe ([facility] and Torn meniscus. has a past surgical history that includes  section (); Abdomen surgery; and joint replacement. Restrictions  Restrictions/Precautions  Restrictions/Precautions: Cardiac, General Precautions, Fall Risk, Up as Tolerated  Required Braces or Orthoses?: No  Subjective   The pt reports that she has been declining for some time. She reports immobility at home even prior to this admission. She just wants to be able to ambulate around her room at home. She is rather fatigued today. Orientation  Oriented x 4  Objective   Ambulation:   7ft with SW and Ike-CGA around the bed with O2 via NC. Unable to ambulate further due to low endurance and fatigue. Stood for ~2 minutes for BP to be taken for orthostatics while holding onto walker and CGA at gait belt. Bed mobility with modified independent - relies heavily on bed rails and momentum to get self back in bed. Unable to participate in exercises due to fatigue after ambulation. Assessment   Low endurance and activity tolerance. Concerns for patient returning to prior living situation. Further PT recommended at discharge.      Goals  Short term goals  Time Frame for Short term goals: independent bed mobility using bed rail  Short term goal 1: independent transfers  Short term goal 2: independent gait with rw x 25' Short term goal 3: prevent loss of strength and independence while in the hospital   Short term goal 4: pt to tolerate ex program to improve endurance and functional independence.     Patient Goals   Patient goals : be able to move better    Plan    Plan  Times per week: 3-5 visits weekly  Times per day: Daily  Current Treatment Recommendations: Strengthening, ROM, Balance Training, Functional Mobility Training, Transfer Training, Endurance Training, Gait Training, Home Exercise Program, Pain Management, Safety Education & Training, Patient/Caregiver Education & Training  Safety Devices  Type of devices: Call light within reach, Patient at risk for falls, Left in chair  Restraints  Initially in place: No     Therapy Time   Individual Concurrent Group Co-treatment   Time In  1114        Time Out  1130         Minutes  [address]

## 2019-05-08 NOTE — CONSULTS
Shae Camacho, Jayson Drivers, [place], Mary Jo Renae, & [place]  Urology Consult    Patient:  Josephine Mitchell  MRN: [id number]  YOB: 1975    CHIEF COMPLAINT:  Microscopic hematuria    HISTORY OF PRESENT ILLNESS:   The patient is a 37 y.o. female admitted with dizziness and CHF exacerbation. 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 Urology is consulted for gross hematuria for about 2-3 months. Patient has noted it about 5-6 times and has noticed it recently. She typically notices it after significant diuresis during CHF exacerbations. No history of UTI and no current dysuria. No history of kidney stones. She has a 15 pack year smoking history, but has not smoked in 1 year. No history of cancer, radiation, pneumaturia or fecaluria. Of note, prior urine cultures appeared to be collected via external catheter and thus had contamination (thus the multiple species). She also notes some urge incontinence and uses briefs for this. Patient's old records, notes and chart reviewed and summarized above. Past Medical History:    Past Medical History:   Diagnosis Date    HIEN (acute kidney injury) ([address] 2019    Asthma     CHF     COPD     Diabetes mellitus, new onset ([address] 2019    HTN     Hyperlipidemia     Morbid obesity with BMI of 60.0-69.9, adult ([address]     Neuromuscular disorder ([address]     Neuropathy Right hand    Pneumonia     Pulmonary hypertension, moderate to severe ([address] 2018    Torn meniscus        Past Surgical History:    Past Surgical History:   Procedure Laterality Date    ABDOMEN SURGERY      Patient had a PEG tube placed 2018, removed 2018     SECTION      JOINT REPLACEMENT         Medications Prior to Admission:    Prior to Admission medications    Medication Sig Start Date End Date Taking?  Authorizing 108 (90 Base) MCG/ACT inhaler Inhale 2 puffs into the lungs every 6 hours as needed 17  Yes Historical Provider, MD   albuterol (PROVENTIL) (2.5 MG/3ML) 0.083% nebulizer solution Take 3 mLs by nebulization every 6 hours as needed for Wheezing. 14  Yes Sierra Thomas MD   budesonide-formoterol (SYMBICORT) 160-4.5 MCG/ACT AERO Inhale 2 puffs into the lungs 2 times daily. 14  Yes Maggie Javier MD   nicotine (NICODERM CQ) 14 MG/24HR Place 1 patch onto the skin daily.  14   Sierra Thomas MD       Allergies:  Bee venom; Aspirin; Beta adrenergic blockers; and Sulfa antibiotics    Social History:    Social History     Socioeconomic History    Marital status: Single     Spouse name: Not on file    Number of children: Not on file    Years of education: Not on file    Highest education level: Not on file   Occupational History    Not on file   Social Needs    Financial resource strain: Not on file    Food insecurity:     Worry: Not on file     Inability: Not on file    Transportation needs:     Medical: Not on file     Non-medical: Not on file   Tobacco Use    Smoking status: Former Smoker     Packs/day: 1.00     Years: 15.00     Pack years: 15.00     Last attempt to quit: 2018     Years since quittin.3    Smokeless tobacco: Never Used   Substance and Sexual Activity    Alcohol use: No    Drug use: No    Sexual activity: Yes   Lifestyle    Physical activity:     Days per week: Not on file     Minutes per session: Not on file    Stress: Not on file   Relationships    Social connections:     Talks on phone: Not on file     Gets together: Not on file     Attends [employer] service: Not on file     Active member of club or organization: Not on file     Attends meetings of clubs or organizations: Not on file     Relationship status: Not on file    Intimate partner violence:     Fear of current or ex partner: Not on file     Emotionally abused: Not on file     Physically abused: Not on file     Forced sexual activity: Not on file   Other Topics Concern    Not on file   Social History Narrative    Not on file       Family History:  History reviewed. No pertinent family history. REVIEW OF SYSTEMS:    Constitutional: negative  Eyes: negative  Respiratory: negative  Cardiovascular: negative  Gastrointestinal: negative  Genitourinary: see HPI  Musculoskeletal: negative  Skin: negative   Neurological: negative  Hematological/Lymphatic: negative  Psychological: negative    Physical Exam:      This a 37 y.o. female   Patient Vitals for the past 24 hrs:   BP Temp Temp src Pulse Resp SpO2   05/08/19 0950 -- -- -- -- -- 98 %   05/08/19 0800 (!) 105/57 -- -- 76 -- --   05/07/19 2120 (!) 149/76 98.2 °F (36.8 °C) Oral 86 18 99 %     Constitutional: Patient in no acute distress. Neuro: Alert and oriented to person, place and time. Psych: mood and affect normal  Lungs: Respiratory effort is normal  Cardiovascular: Normal peripheral pulses  Abdomen: Soft, obese, non-tender, non-distended  No CVA tenderness to palpation  Bladder non-tender and not distended. Lower extremities: + edema, no calf tenderness bilaterally    LABS:   Recent Labs     05/06/19  1139 05/07/19  0850   WBC 8.5 6.8   HGB 9.4* 8.8*   HCT 32.2* 30.0*   MCV 92.0 93.2    266     Recent Labs     05/06/19  1139 05/07/19  0850    139   K 3.4* 3.6*   CL 88* 91*   CO2 32* 33*   BUN 24* 26*   CREATININE 1.22* 1.14*       Additional Lab/culture results:    Urinalysis:   Recent Labs     05/06/19  1613   COLORU YELLOW   PHUR 6.5   WBCUA 2 TO 5   RBCUA 10 TO 20   MUCUS NOT REPORTED   TRICHOMONAS NOT REPORTED   YEAST NOT REPORTED   BACTERIA NOT REPORTED   SPECGRAV 1.016   LEUKOCYTESUR NEGATIVE   UROBILINOGEN Normal   BILIRUBINUR NEGATIVE        -----------------------------------------------------------------  Imaging Results:    Imaging was independently reviewed and confirmed with report.     Renal ultrasound (5/7/19)  - No renal masses, stones, or hydronephrosis noted    Assessment and Plan   Impression:      The patient is a 37 y.o. female admitted with dizziness and CHF exacerbation     Problem List  - Gross hematuria  - Urge incontinence    Plan:     - Patient will need gross hematuria workup given age, smoking history and presence of gross hematuria.  This will include cystoscopy outpatient and CT Urogram, which can potentially be done inpatient  - Urinalysis (5/6/19) showed 10-20 rbc /hpf with neg nit/LE and 2-5 wbc/ hpf  - Renal ultrasound (5/7/19) reviewed: no hydronephrosis, stones, or masses

## 2019-05-09 VITALS
HEIGHT: 60 IN | TEMPERATURE: 97.9 F | DIASTOLIC BLOOD PRESSURE: 55 MMHG | BODY MASS INDEX: 57.52 KG/M2 | HEART RATE: 79 BPM | RESPIRATION RATE: 20 BRPM | OXYGEN SATURATION: 91 % | SYSTOLIC BLOOD PRESSURE: 106 MMHG | WEIGHT: 293 LBS

## 2019-05-09 LAB
ANION GAP SERPL CALCULATED.3IONS-SCNC: 11 MMOL/L (ref 9–17)
ANION GAP SERPL CALCULATED.3IONS-SCNC: 13 MMOL/L (ref 9–17)
BUN BLDV-MCNC: 32 MG/DL (ref 6–20)
BUN BLDV-MCNC: 32 MG/DL (ref 6–20)
BUN/CREAT BLD: ABNORMAL (ref 9–20)
BUN/CREAT BLD: ABNORMAL (ref 9–20)
CALCIUM SERPL-MCNC: 9.2 MG/DL (ref 8.6–10.4)
CALCIUM SERPL-MCNC: 9.5 MG/DL (ref 8.6–10.4)
CHLORIDE BLD-SCNC: 87 MMOL/L (ref 98–107)
CHLORIDE BLD-SCNC: 88 MMOL/L (ref 98–107)
CO2: 37 MMOL/L (ref 20–31)
CO2: 39 MMOL/L (ref 20–31)
CREAT SERPL-MCNC: 1.2 MG/DL (ref 0.5–0.9)
CREAT SERPL-MCNC: 1.26 MG/DL (ref 0.5–0.9)
GFR AFRICAN AMERICAN: 56 ML/MIN
GFR AFRICAN AMERICAN: 59 ML/MIN
GFR NON-AFRICAN AMERICAN: 46 ML/MIN
GFR NON-AFRICAN AMERICAN: 49 ML/MIN
GFR SERPL CREATININE-BSD FRML MDRD: ABNORMAL ML/MIN/{1.73_M2}
GLUCOSE BLD-MCNC: 141 MG/DL (ref 65–105)
GLUCOSE BLD-MCNC: 149 MG/DL (ref 65–105)
GLUCOSE BLD-MCNC: 158 MG/DL (ref 70–99)
GLUCOSE BLD-MCNC: 221 MG/DL (ref 70–99)
GLUCOSE BLD-MCNC: 247 MG/DL (ref 65–105)
MAGNESIUM: 1.6 MG/DL (ref 1.6–2.6)
P E INTERPRETATION, U: NORMAL
PATHOLOGIST: NORMAL
POTASSIUM SERPL-SCNC: 3.3 MMOL/L (ref 3.7–5.3)
POTASSIUM SERPL-SCNC: 3.6 MMOL/L (ref 3.7–5.3)
SODIUM BLD-SCNC: 137 MMOL/L (ref 135–144)
SODIUM BLD-SCNC: 138 MMOL/L (ref 135–144)
SPECIMEN TYPE: NORMAL
URINE TOTAL PROTEIN: 8 MG/DL

## 2019-05-09 PROCEDURE — 82947 ASSAY GLUCOSE BLOOD QUANT: CPT

## 2019-05-09 PROCEDURE — 36415 COLL VENOUS BLD VENIPUNCTURE: CPT

## 2019-05-09 PROCEDURE — 2580000003 HC RX 258: Performed by: INTERNAL MEDICINE

## 2019-05-09 PROCEDURE — 6370000000 HC RX 637 (ALT 250 FOR IP): Performed by: INTERNAL MEDICINE

## 2019-05-09 PROCEDURE — 80048 BASIC METABOLIC PNL TOTAL CA: CPT

## 2019-05-09 PROCEDURE — 6360000002 HC RX W HCPCS: Performed by: INTERNAL MEDICINE

## 2019-05-09 PROCEDURE — G0108 DIAB MANAGE TRN  PER INDIV: HCPCS

## 2019-05-09 PROCEDURE — 83735 ASSAY OF MAGNESIUM: CPT

## 2019-05-09 PROCEDURE — 1200000000 HC SEMI PRIVATE

## 2019-05-09 PROCEDURE — 6370000000 HC RX 637 (ALT 250 FOR IP): Performed by: STUDENT IN AN ORGANIZED HEALTH CARE EDUCATION/TRAINING PROGRAM

## 2019-05-09 PROCEDURE — 99239 HOSP IP/OBS DSCHRG MGMT >30: CPT | Performed by: INTERNAL MEDICINE

## 2019-05-09 RX ADMIN — HYDRALAZINE HYDROCHLORIDE 25 MG: 25 TABLET ORAL at 06:25

## 2019-05-09 RX ADMIN — OXYCODONE HYDROCHLORIDE AND ACETAMINOPHEN 2 TABLET: 5; 325 TABLET ORAL at 15:21

## 2019-05-09 RX ADMIN — PANTOPRAZOLE SODIUM 40 MG: 40 TABLET, DELAYED RELEASE ORAL at 06:25

## 2019-05-09 RX ADMIN — BUMETANIDE 2 MG: 1 TABLET ORAL at 08:29

## 2019-05-09 RX ADMIN — HYDRALAZINE HYDROCHLORIDE 25 MG: 25 TABLET ORAL at 15:21

## 2019-05-09 RX ADMIN — ENOXAPARIN SODIUM 30 MG: 30 INJECTION SUBCUTANEOUS at 08:28

## 2019-05-09 RX ADMIN — ISOSORBIDE DINITRATE 20 MG: 10 TABLET ORAL at 08:29

## 2019-05-09 RX ADMIN — OXYCODONE HYDROCHLORIDE AND ACETAMINOPHEN 2 TABLET: 5; 325 TABLET ORAL at 08:29

## 2019-05-09 RX ADMIN — FLUTICASONE PROPIONATE 1 SPRAY: 50 SPRAY, METERED NASAL at 08:29

## 2019-05-09 RX ADMIN — TIOTROPIUM BROMIDE 18 MCG: 18 CAPSULE ORAL; RESPIRATORY (INHALATION) at 09:55

## 2019-05-09 RX ADMIN — METOLAZONE 2.5 MG: 2.5 TABLET ORAL at 08:30

## 2019-05-09 RX ADMIN — GABAPENTIN 800 MG: 800 TABLET ORAL at 15:21

## 2019-05-09 RX ADMIN — SPIRONOLACTONE 25 MG: 25 TABLET ORAL at 08:30

## 2019-05-09 RX ADMIN — Medication 10 ML: at 08:31

## 2019-05-09 RX ADMIN — INSULIN LISPRO 2 UNITS: 100 INJECTION, SOLUTION INTRAVENOUS; SUBCUTANEOUS at 12:57

## 2019-05-09 RX ADMIN — CETIRIZINE HYDROCHLORIDE 10 MG: 10 TABLET ORAL at 08:31

## 2019-05-09 RX ADMIN — GABAPENTIN 800 MG: 800 TABLET ORAL at 08:29

## 2019-05-09 RX ADMIN — INSULIN LISPRO 1 UNITS: 100 INJECTION, SOLUTION INTRAVENOUS; SUBCUTANEOUS at 17:57

## 2019-05-09 RX ADMIN — ISOSORBIDE DINITRATE 20 MG: 10 TABLET ORAL at 15:22

## 2019-05-09 RX ADMIN — INSULIN LISPRO 1 UNITS: 100 INJECTION, SOLUTION INTRAVENOUS; SUBCUTANEOUS at 08:39

## 2019-05-09 ASSESSMENT — PAIN SCALES - GENERAL
PAINLEVEL_OUTOF10: 7
PAINLEVEL_OUTOF10: 7
PAINLEVEL_OUTOF10: 6

## 2019-05-09 NOTE — PROGRESS NOTES
Nutrition Education    Type and Reason for Visit:  New on set DM    Patient stated she's been on a Low Na Fluid restricted diet in the past    · Verbally reviewed following information with Patient: Carb Counting (45-60g per meal)  · Written educational materials provided. · Contact name and number provided. · Refer to Patient Education activity for more details.     Electronically signed by Val Moser RD, LD on 5/9/19 at 12:22 PM    Contact Number: [phone]

## 2019-05-09 NOTE — PROGRESS NOTES
SUBJECTIVE    Patient was seen and examined and chart reviewed. Reason for consult was management of diuretics. Currently patient is on Zaroxolyn, Bumex IV and Aldactone. Patient came in with 30 pound weight gain. Patient has no significant proteinuria  Patient has morbid obesity and pulmonary hypertension    OBJECTIVE      CURRENT TEMPERATURE:  Temp: 97.9 °F (36.6 °C)  MAXIMUM TEMPERATURE OVER 24HRS:  Temp (24hrs), Av.2 °F (36.8 °C), Min:97.9 °F (36.6 °C), Max:98.4 °F (36.9 °C)    CURRENT RESPIRATORY RATE:  Resp: 20  CURRENT PULSE:  Pulse: 79  CURRENT BLOOD PRESSURE:  BP: (!) 106/55  24HR BLOOD PRESSURE RANGE:  Systolic (84KWG), FUS:200 , Min:106 , AYC:696   ; Diastolic (67HRP), OZI:17, Min:55, Max:55    24HR INTAKE/OUTPUT:      Intake/Output Summary (Last 24 hours) at 2019 1155  Last data filed at 2019 0800  Gross per 24 hour   Intake 370 ml   Output --   Net 370 ml     WEIGHT :  Patient Vitals for the past 96 hrs (Last 3 readings):   Weight   19 1137 (!) 385 lb (174.6 kg)     PHYSICAL EXAM      GENERAL APPEARANCE: Awake and alert  NECK:   Very short neck and hard to assess JVD  PULMONARY: Bilateral air entry   CADRDIOVASCULAR: S1 and S2 audible no S3 or murmur.   1+ edema   ABDOMEN: soft nontender, bowel sounds present, no organomegaly,  no ascites EXTREMITIES: no cyanosis, clubbing or edema     CURRENT MEDICATIONS        metolazone (ZAROXOLYN) tablet 2.5 mg Daily   spironolactone (ALDACTONE) tablet 25 mg Daily   bumetanide (BUMEX) tablet 2 mg BID   atorvastatin (LIPITOR) tablet 40 mg Nightly   fluticasone (FLONASE) 50 MCG/ACT nasal spray 1 spray Daily   ipratropium-albuterol (DUONEB) nebulizer solution 3 mL Q4H PRN   isosorbide dinitrate (ISORDIL) tablet 20 mg TID   tiotropium (SPIRIVA) inhalation capsule 18 mcg Daily   sodium chloride flush 0.9 % injection 10 mL 2 times per day   sodium chloride flush 0.9 % injection 10 mL PRN   magnesium hydroxide (MILK OF MAGNESIA) 400 MG/5ML 05/08/2019      HEPBSAG:  Lab Results   Component Value Date    HEPBSAG NONREACTIVE 05/07/2019     HEPCAB:  Lab Results   Component Value Date    HEPCAB NONREACTIVE 05/07/2019     C3:   Lab Results   Component Value Date    C3 211 (H) 05/07/2019     C4:   Lab Results   Component Value Date    C4 48 (H) 05/07/2019     URINE CREATININE:  Lab Results   Component Value Date    LABCREA 78.2 05/08/2019     URINE EOSINOPHILS: No components found for: EOSU  URINE PROTEIN:    Lab Results   Component Value Date    TPU 8 05/08/2019     URINALYSIS:  U/A: Lab Results   Component Value Date    NITRU NEGATIVE 05/08/2019    COLORU YELLOW 05/08/2019    PHUR 6.5 05/08/2019    WBCUA 0 TO 2 05/08/2019    RBCUA 10 TO 20 05/08/2019    MUCUS NOT REPORTED 05/08/2019    TRICHOMONAS NOT REPORTED 05/08/2019    YEAST NOT REPORTED 05/08/2019    BACTERIA FEW 05/08/2019    SPECGRAV 1.019 05/08/2019    LEUKOCYTESUR NEGATIVE 05/08/2019    UROBILINOGEN Normal 05/08/2019    BILIRUBINUR NEGATIVE 05/08/2019    GLUCOSEU NEGATIVE 05/08/2019    KETUA NEGATIVE 05/08/2019    AMORPHOUS NOT REPORTED 05/08/2019     ANTIGBM:No results found for: GBMABIGG      RADIOLOGY      Reviewed as available. ASSESSMENT      1. Acute kidney injury creatinine is now up to 1.26. There is no intake and output available from last 24 hours nor weight. Her baseline creatinine is somewhere around 1 mg/dL  2. Patient is hypokalemic probably due to diuresis  3. Morbid obesity and possible significant right-sided dysfunction. However her last echo shows moderate pulmonary hypertension. Patient will be benefited from right heart catheterization. 4.  Admitted with lightheadedness and dizziness due to markedly elevated blood sugar  5. PLAN      1. Repeat BMP at 2:00 today and if potassium and creatinine is better patient can be discharged from renal standpoint  2. Follow-up with CHF clinic in a week.   3.  Basic metabolic profile on monday 13 May and fax results to our office [phone]  4. Follow up with nephrology in 2-4 weeks and call [phone] for an appointment      Please do not hesitate to call with questions.     Electronically signed by Sofie Jimenez MD on 5/9/2019 at 11:55 AM

## 2019-05-09 NOTE — PROGRESS NOTES
Patient preparing for discharge. Diabetic Education given and nutrition teaching given at bedside. Patient states understating.

## 2019-05-09 NOTE — PROGRESS NOTES
Urology Progress Note    Subjective: Kishore Edmondson is a 37 y.o. female. His/Her current Diet is: DIET LOW SODIUM 2 GM; 1800 ml. No acute urologic events overnight.    No chest pain, shortness of breath, nausea, vomiting, fevers, chills      Patient Vitals for the past 24 hrs:   BP Temp Temp src Pulse Resp SpO2   05/08/19 2039 (!) 118/55 98.4 °F (36.9 °C) Oral 75 18 99 %   05/08/19 0950 -- -- -- -- -- 98 %   05/08/19 0800 (!) 105/57 -- -- 76 20 --       Intake/Output Summary (Last 24 hours) at 5/9/2019 0720  Last data filed at 5/8/2019 2041  Gross per 24 hour   Intake 10 ml   Output 90 ml   Net -80 ml       Recent Labs     05/06/19  1139 05/07/19  0850   WBC 8.5 6.8   HGB 9.4* 8.8*   HCT 32.2* 30.0*   MCV 92.0 93.2    266     Recent Labs     05/06/19  1139 05/07/19  0850 05/08/19  1330    139 139   K 3.4* 3.6* 3.5*   CL 88* 91* 91*   CO2 32* 33* 33*   BUN 24* 26* 30*   CREATININE 1.22* 1.14* 1.19*       Recent Labs     05/08/19  1809   COLORU YELLOW   PHUR 6.5   WBCUA 0 TO 2   RBCUA 10 TO 20   MUCUS NOT REPORTED   TRICHOMONAS NOT REPORTED   YEAST NOT REPORTED   BACTERIA FEW*   SPECGRAV 1.019   LEUKOCYTESUR NEGATIVE   UROBILINOGEN Normal   BILIRUBINUR NEGATIVE       Current Facility-Administered Medications   Medication Dose Route Frequency Provider Last Rate Last Dose    metolazone (ZAROXOLYN) tablet 2.5 mg  2.5 mg Oral Daily Rod Chirinos MD   2.5 mg at 05/08/19 0816    spironolactone (ALDACTONE) tablet 25 mg  25 mg Oral Daily Rod Chirinos MD   25 mg at 05/08/19 0816    bumetanide (BUMEX) tablet 2 mg  2 mg Oral BID Rod Chirinos MD   2 mg at 05/08/19 2040    atorvastatin (LIPITOR) tablet 40 mg  40 mg Oral Nightly Chivo Estrada MD   40 mg at 05/08/19 2039    fluticasone (FLONASE) 50 MCG/ACT nasal spray 1 spray  1 spray Nasal Daily Chivo Estrada MD   1 spray at 05/08/19 0817    ipratropium-albuterol (DUONEB) nebulizer solution 3 mL  3 mL Inhalation Q4H PRN Chivo Estrada MD   3 mL at  dextrose 5 % solution  100 mL/hr Intravenous PRN Mackenzie Hall MD                Additional Lab/culture results:    Physical Exam:   NAD  AOx3  Peripheral pulses palpable  Regular rhythm  Respirations nonlabored, symmetric chest rise bilaterally on positive pressure mask. Soft, NT, ND   no mancilla  No calf ttp bilaterally  EPC cuffs on and functioning billaterally      Interval Imaging Findings:    CT Urogram: kidneys ureters and bladder without mass or filling defect. Culture Results:      Impression:    The patient is a 37 y.o. female admitted with dizziness and CHF exacerbation      Problem List  - Gross hematuria, possible psuedohematuria related to menses. - Urge incontinence     Plan:      - Patient will need gross hematuria workup given age, smoking history and presence of gross hematuria. This will include cystoscopy outpatient   - CT Urogram is okay.    - Urinalysis (5/6/19) showed 10-20 rbc /hpf with neg nit/LE and 2-5 wbc/ hpf  - Renal ultrasound (5/7/19) reviewed: no hydronephrosis, stones, or masses        Roz Corrigan  Urology Resident, PGY2  7:20 AM 5/9/2019

## 2019-05-09 NOTE — PROGRESS NOTES
[address] [facility].    PROGRESS NOTE             Date:   5/9/2019  Patient name:  Isauro Herrera  Date of admission:  5/6/2019 11:19 AM  MRN:   [id number]  YOB: 1975    CHIEF COMPLAINT     History Obtained From:  Patient and chart review. HISTORY OF PRESENT ILLNESS      The patient is a 37 y.o.  female who is admitted to the hospital for dizziness and lightheadedness and found to have hyperglycemia with blood sugar 424. On presentation she also had HIEN with creatinine of 1.22(baseline 0.9). She was on multiple diuretics at home(managed by her cardiologist) and was scheduled to follow-up with nephrology for diuretic adjustment. Orthostatics were negative, diuretics were held. Her new HbA1c is 6.8(6.3 in feb). Will monitor creatinine and adjust diuretics as appropriate. Current evaluation:5/9/19  She is hemodynamically stable afebrile this morning saturating well on 2.5 L of oxygen(home oxygen 2-3L)via nasal cannula spo2-98%. She denied any dizziness, lightheadedness or any other acute issues overnight. Her creatinine was 1.19 yest  (1.22 on admission). She was seen by nephrology yesterday and started on Bumex 2 mg twice a day, metolazone 2.5 mg daily and Aldactone 25 daily. CT urogram done yesterday as per urology for microscopic hematuria was on unremarkable except endometrial thickening(likely perimenopausal findings ). Will follow-up transvaginal ultrasound. Her blood sugars(140-150 overnight) have been controlled on  insulin sliding scale. No other acute issues overnight. Patient is stable to be discharged home.       PAST MEDICAL HISTORY       has a past medical history of HIEN (acute kidney injury) ([address] Asthma, CHF, COPD, Diabetes mellitus, new onset ([address] HTN, Hyperlipidemia, Morbid obesity with BMI of 60.0-69.9, adult ([address] Neuromuscular disorder ([address] Pneumonia, Pulmonary hypertension, moderate to severe ([facility] and Torn meniscus. PAST SURGICAL HISTORY       has a past surgical history that includes  section (); Abdomen surgery; and joint replacement. HOME MEDICATIONS        Prior to Admission medications    Medication Sig Start Date End Date Taking? Authorizing Provider   bumetanide (BUMEX) 2 MG tablet Take 1 tablet by mouth 2 times daily 19  Yes Sandip Montiel MD   glucose monitoring kit (FREESTYLE) monitoring kit 1 kit by Does not apply route daily 19  Yes Beni Grady MD   blood glucose monitor strips Test three  times a day & as needed for symptoms of irregular blood glucose. 19  Yes Beni Grady MD   Lancets MISC 1 each by Does not apply route daily 19  Yes Beni Grady MD   oxyCODONE-acetaminophen (PERCOCET)  MG per tablet Take 1 tablet by mouth every 6 hours as needed for Pain. Yes Historical Provider, MD   metolazone (ZAROXOLYN) 2.5 MG tablet Take 2.5 mg by mouth daily   Yes Historical Provider, MD   isosorbide dinitrate (ISORDIL) 20 MG tablet Take 20 mg by mouth 3 times daily   Yes Historical Provider, MD   spironolactone (ALDACTONE) 25 MG tablet Take 25 mg by mouth daily   Yes Historical Provider, MD   pantoprazole sodium (PROTONIX) 40 MG PACK packet Take 40 mg by mouth every morning (before breakfast)   Yes Historical Provider, MD   atorvastatin (LIPITOR) 40 MG tablet Take 40 mg by mouth nightly   Yes Historical Provider, MD   ferrous sulfate 325 (65 Fe) MG EC tablet Take 325 g by mouth 2 times daily (with meals) 18  Yes Historical Provider, MD   fluticasone (FLONASE) 50 MCG/ACT nasal spray 1 spray by Nasal route daily   Yes Historical Provider, MD   gabapentin (NEURONTIN) 600 MG tablet Take 800 mg by mouth 3 times daily.     Yes Historical Provider, MD   hydrALAZINE (APRESOLINE) 25 MG tablet Take 75 mg by mouth daily   Yes Historical Provider, MD   ipratropium-albuterol (DUONEB) 0.5-2.5 (3) MG/3ML SOLN nebulizer solution Inhale 3 mLs into the lungs every 6 hours as needed   Yes Historical Provider, MD   loratadine (CLARITIN) 10 MG tablet Take 10 mg by mouth daily   Yes Historical Provider, MD   Magnesium Oxide (MAG-200) 200 MG TABS Take 400 mg by mouth 2 times daily   Yes Historical Provider, MD   ondansetron (ZOFRAN-ODT) 4 MG disintegrating tablet Take 4 mg by mouth every 8 hours as needed   Yes Historical Provider, MD   tiotropium (SPIRIVA) 18 MCG inhalation capsule Inhale 1 capsule into the lungs 8/31/17  Yes Historical Provider, MD   sertraline (ZOLOFT) 100 MG tablet Take 100 mg by mouth daily   Yes Historical Provider, MD   albuterol sulfate  (90 Base) MCG/ACT inhaler Inhale 2 puffs into the lungs every 6 hours as needed 8/31/17  Yes Historical Provider, MD   albuterol (PROVENTIL) (2.5 MG/3ML) 0.083% nebulizer solution Take 3 mLs by nebulization every 6 hours as needed for Wheezing. 9/24/14  Yes Niecy Mistry MD   budesonide-formoterol (SYMBICORT) 160-4.5 MCG/ACT AERO Inhale 2 puffs into the lungs 2 times daily. 9/24/14  Yes Constantino Willis MD   nicotine (NICODERM CQ) 14 MG/24HR Place 1 patch onto the skin daily. 9/24/14   Niecy Mistry MD       ALLERGIES      Bee venom; Aspirin; Beta adrenergic blockers; and Sulfa antibiotics    SOCIAL HISTORY       reports that she quit smoking about 16 months ago. She has a 15.00 pack-year smoking history. She has never used smokeless tobacco. She reports that she does not drink alcohol or use drugs. FAMILY HISTORY      family history is not on file. REVIEW OF SYSTEMS      · Constitutional: Negative for weight loss  · Eyes: Negative for visual changes, diplopia, scleral icterus. · ENT: Negative for Headaches, hearing loss, vertigo, mouth sores, sore throat. · Cardiovascular: Negative for lightheadedness/orthostatic symptoms ,chest pain, dyspnea on exertion, palpitations or loss of consciousness.    · Respiratory: Negative for cough or wheezing, sputum production, hemoptysis, pleuritic pain.  · Gastrointestinal: Negative for nausea/vomiting, change in bowel habits, abdominal pain, dysphagia/appetite loss, hematemesis, blood in stools. · Genitourinary:Negative for change in bladder habits, dysuria, trouble voiding, hematuria. · Musculoskeletal: Negative for gait disturbance, weakness, joint complaints. · Integumentary: Negative for rash, pruritis. · Neurological:Positive  headache, dizziness that resolved today  change in muscle strength, numbness/tingling, change in gait, balance, coordination,   · Psychiatric: negative for change in mood, affect, memory, mentation, behavior. · Endocrine: negative for temperature intolerance, excessive thirst, fluid intake, or urination, tremor. · Hematologic/Lymphatic: negative for abnormal bruising or bleeding, blood clots, swollen lymph nodes. · Allergic/Immunologic: negative for nasal congestion, pruritis, hives. PHYSICAL EXAM      BP (!) 118/55   Pulse 76   Temp 98.2 °F (36.8 °C) (Oral)   Resp 20   Ht 5' (1.524 m)   Wt (!) 385 lb (174.6 kg)   LMP  (LMP Unknown)   SpO2 98%   BMI 75.19 kg/m²      · General appearance: well nourished  · HEENT: Head: Normocephalic, no lesions, without obvious abnormality. · Lungs: clear to auscultation bilaterally  · Heart: regular rate and rhythm, S1, S2 normal, no murmur, click, rub or gallop  · Abdomen: soft, non-tender; bowel sounds normal; no masses,  no organomegaly  · Extremities:Bilateral pitting lower extremity edema 2+ present(at her baseline)  · Neurological: Gait normal. Reflexes normal and symmetric.  Sensation grossly normal  · Skin - no rash, no lump   · Eye no icterus no redness  · Psych-normal affect   · NEURO-no limb weakness  No facial droop  · Lymphatic system-no lymphadenopathy no splenomegaly     DIAGNOSTICS      Laboratory Testing:  CBC:   Recent Labs     05/07/19  0850   WBC 6.8   HGB 8.8*        BMP:    Recent Labs     05/06/19  1139 05/07/19  0850 05/08/19  1330    139 139 K 3.4* 3.6* 3.5*   CL 88* 91* 91*   CO2 32* 33* 33*   BUN 24* 26* 30*   CREATININE 1.22* 1.14* 1.19*   GLUCOSE 254* 157* 178*     S. Calcium:  Recent Labs     05/08/19  1330   CALCIUM 9.1     S. Ionized Calcium:No results for input(s): IONCA in the last 72 hours. S. Magnesium:  Recent Labs     05/08/19  1330   MG 1.7     S. Phosphorus:No results for input(s): PHOS in the last 72 hours. S. Glucose:  Recent Labs     05/08/19  1126 05/08/19  1758 05/08/19  2035   POCGLU 186* 127* 195*     Glycosylated hemoglobin A1C:   Recent Labs     05/06/19  1139   LABA1C 6.8*     INR: No results for input(s): INR in the last 72 hours. Hepatic functions:   Recent Labs     05/07/19  1633   PROT 7.9     Pancreatic functions:No results for input(s): LACTA, AMYLASE in the last 72 hours. S. Lactic Acid: No results for input(s): LACTA in the last 72 hours. Cardiac enzymes:No results for input(s): CKTOTAL, CKMB, CKMBINDEX, TROPONINI in the last 72 hours. BNP:No results for input(s): BNP in the last 72 hours. Lipid profile: No results for input(s): CHOL, TRIG, HDL, LDLCALC in the last 72 hours. Invalid input(s): LDL  Blood Gases: No results found for: PH, PCO2, PO2, HCO3, O2SAT  Thyroid functions:   Lab Results   Component Value Date    TSH 2.36 02/23/2019        Imaging/Diagonstics:      CXR: No acute cardiopulmonary findings.     ASSESSMENT     Patient Active Problem List   Diagnosis    Asthma    COPD (chronic obstructive pulmonary disease) ([facility]    Pneumonia    HTN (hypertension)    Torn meniscus    Chest pain    Pulmonary hypertension, moderate to severe ([facility]    Morbid obesity with BMI of 60.0-69.9, adult ([facility]    Obesity hypoventilation syndrome ([facility]    H/O tracheostomy    STEPH (obstructive sleep apnea)    Right heart failure, unspecified (HCC)    Acute on chronic diastolic heart failure (HCC)    Acute on chronic congestive heart failure ([address]    Diabetes mellitus, new onset ([facility]    HIEN (acute kidney injury) ([facility]    Dizziness    Normocytic anemia       PLAN      1. Dizziness and over diuresis secondary to new onset diabetes mellitus -resolved  Orthostatics is negative so far. Nephrology following . on Bumex 2 mg twice a day, Aldactone 25 mg daily and metolazone 2.5 mg daily. 2.HIEN-resolving  cr 1.22 on presentation(baseline 0.9). Creatinine 1.19 yesterday. Nephrology following. Continued on Bumex 2 mg twice a day, Aldactone 25 mg daily and metolazone 2.5 mg daily. Will monitor creatinine and urine output. Avoid nephrotoxic drugs. Daily BMP. 3.  Microscopic hematuria-stable  Urinalysis (5/6/19) showed 10-20 rbc /hpf with neg nit/LE and 2-5 wbc/ hpf. Renal ultrasound (5/7/19) reviewed: no hydronephrosis, stones, or masses. CT urogram done yesterday was unremarkable except endometrial thickening(related to menstrual cycle). Will follow-up transvaginal ultrasound. 4.Chronic respiratory failure-Stable  On 2-3 L of home oxygen. No recent increase in oxygen. 5.COPD-stable  Continue breathing treatments with spiriva and duoneb. RT aerosol treatment. RT eval and treat. Pulse ox. Continue telemetry. Oxygen as per protocol. 6..Newly diagnosed diabetes mellitus  Recent HbA1c 6.8.(Last HbA1c 6.3 in February). poct glucose -154. Overnight blood sugars in 180-190. poct glucose checks AC, HS. continued on Insulin sliding scale. Hypoglycemia protocol. 6.  Chronic diastolic heart failure-stable. Continued on Bumex 2 mg twice a day, Aldactone 25 mg daily and metolazone 2.5 mg daily. Pulmonary hypertension-stable. 7.  Obstructive sleep apnea  Recommended to use CPAP at night. 8.  Hypertension  On hydralazine 25 3 times a day and isosorbide 20 3 times a day. Nephrology following and continued on Bumex 2 mg twice a day, Aldactone 25 mg daily and metolazone 2.5 mg daily. 9.  Normocytic anemia-stable  fe studies suggestive of normocytic anemia.     DVT prophylaxis-on Lovenox 30 twice a day  Diet -cardiac diet with fluid restriction. PT/OT following  Case manger following for discharge planning-likely d/c home today. [address] [facility]  [address], [address] [place] [address]. Phone [phone]   Fax: [phone]  Answering Service: [phone]         Attending Physician Statement  I have discussed the care of [address] [place] [address] and I have examined the patient myselft and taken ros and hpi , including pertinent history and exam findings,  with the resident. I have reviewed the key elements of all parts of the encounter with the resident. I agree with the assessment, plan and orders as documented by the resident.     Ok to [employer]  Bmp Monday  Results to nephro  Wt loss cllinc out pt    Electronically signed by Monty Chandra MD

## 2019-05-09 NOTE — PROGRESS NOTES
CLINICAL PHARMACY NOTE: MEDS TO [address] [facility] Patient?: No  Total # of Prescriptions Filled: 4   The following medications were delivered to the patient:  · Bumetanide 2 mg  · One touch lancets  · Test strips  · One touch ultra mini glucometer  Total # of Interventions Completed: 0  Time Spent (min): 30    Additional Documentation:

## 2019-05-09 NOTE — PROGRESS NOTES
Violetta Childs,  Seen for evaluation and education on Type 2 uncontrolled    Lab Results   Component Value Date    LABA1C 6.8 (H) 05/06/2019     Lab Results   Component Value Date     05/06/2019       Discussed with Violetta Childs, her new diagnosis of  Diabetes. Patient does have family Hx of T2DM. Patient has multiple health issues and is w/c bound. Patient is unable to come to OP DM Ed classes and family doctor comes to the home. Patent was very receptive to education and asking a lot of questions. Following Survival Skills education topics were covered as appropriate to patient plan of for care:  _x__ Type 2 Diabetes diagnosis as chronic and progressive       _x__ Healthy eating - CHO food groups and need for CHO controlled diet. Elimination of sugary beverages, avoid skipping meal  Patient admits to skipping meals and drinking Reg pop and juice and Reg Koolaid. Patient is willing to switch to diet pop and avoid juice and try sugar free koolaid. Patient did meet with dietician and was set up on 45-60 grams CHO at meals    _x__ Role of physical activity - bouts of walking, swimming or low impact aerobics as recommended by care providers- aim for 30 minutes per day  Patient is very limited in walking due to CHF, COPD but willing to do movement in her W/C.      _x__ Blood Glucose Self-Monitoring ( BGSM)  /how to use a BG meter -Patient was instructed on meter that the OP Pharmacy sent up, One Touch Mini and delica lancing device. Patient did self BG which was 288 post meal. Patient aware that this above target range.     _x__ Blood sugar targets Pre meal 80 - 130 and 2 hours post meal < 180    _x__ Hyperglycemia  ( BG over 250) signs and symptoms and treatment    __ Sick Day Care    _x__ Hypoglycemia( BG < than 70) signs and symptoms and treatment \"Rule of 15\"    _x__ Keep BG log and take log and meter to follow up HCP appointments    _x__ Medications -  Insulin Lantus - Basal ( long acting) / Humalog-  Bolus (pre meal)  regime - insulin action times. Patient is receiving Humalog at meals per ISS but no basal insulin. Briefly discussed the action of the insulin but it is not indicated for patient to go home on insulin. _x__ Medications - Orals Januvia, Discussed action of Januvia and consistency in taking daily. Following Support materials were provided for patient to take home:  _x__ Educational Booklet \"Diabetes Leaving the Hospital\"  _x__ Be safe with [place] teaching sheet from [employer] for Life  _x_   self care diary for blood glucose and food, Diabetes ID card  _x__ [facility] Diabetes Education brochure/ contact card      Patient verbalizes, demonstrates and needs reinforcement. understanding  of survival skills education. Patient very receptive to education and willing to practice with meter. Patient willing to make changes in giving up sugary beverages and eating meals with small snacks. Patient very appreciative.     RECOMMENDATIONS INPATIENT PLAN:  ___ Dietician Consult this admission for education on CHO diet and diet plan for home  Done    RN Bedside Support Plan:  ___ Bedside RN to support patient with administration of insulin at bedside  _x__ Bedside RN to support patient with SMBG - - Reinforce target BG ranges, Hyper and Hypo signs and symptoms and treatment and how to use a home BG meter  _x_ Bedside RN to coordinate BG Check with mealtime and insulin  __ Bedside RN to ensure snack at HS for patient on HS correction scale insulin  _x_ Bedside RN to reinforce survival skills education and diabetes self care    _x__ set up patient to watch Education TV Channels [facility] [employer] Channels 75 and 76 at [address] [place] [address], 3pm,  7pm, 9pm)         RECOMMENDATIONS FOR OUTPATIENT PLAN:  Diabetes Self-Monitoring Supplies:  __x_ Preferred / formulary blood glucose meter for BGSM at home use patient received One Touch Ultra mini with the delica lancing device    __x_ Strips and lancets for 3  frequency of home BGSM    Diabetes Medications:  NO Insulin at this time  ___ Insulin Pen or Vial  Basal / long acting - dose per MD   ___ Insulin Pen or Vial  Bolus pre meal set dose  or correction scale - dose per MD  ___ Insulin Delivery method - Pens -   order Insulin Pen Needle 31G X 4 mm MISC  ___ Insulin Delivery method - Syringe - order  Insulin syringe Needle U 100 31G X 15/64\" 0.5ml    Diabetes Education / HCP follow -up :  Patient unable to come to Diabetes Ed at this time   __ Would recommend follow -up education at outpatient diabetes education at [place] [address]. An ordered is needed for this service and can be placed via EHR. Discharge Navigator --- Med Reconciliation -- New orders for discharge tab -- search diabetic ed - REF20 -  [facility]  - review and sign     _x__ Follow -up with HCP / PCP within one week.     Clovis Amaya RN

## 2019-05-12 NOTE — DISCHARGE SUMMARY
tablet Take 800 mg by mouth 3 times daily. Historical Med      hydrALAZINE (APRESOLINE) 25 MG tablet Take 75 mg by mouth dailyHistorical Med      ipratropium-albuterol (DUONEB) 0.5-2.5 (3) MG/3ML SOLN nebulizer solution Inhale 3 mLs into the lungs every 6 hours as neededHistorical Med      loratadine (CLARITIN) 10 MG tablet Take 10 mg by mouth dailyHistorical Med      Magnesium Oxide (MAG-200) 200 MG TABS Take 400 mg by mouth 2 times dailyHistorical Med      ondansetron (ZOFRAN-ODT) 4 MG disintegrating tablet Take 4 mg by mouth every 8 hours as neededHistorical Med      tiotropium (SPIRIVA) 18 MCG inhalation capsule Inhale 1 capsule into the lungsHistorical Med      sertraline (ZOLOFT) 100 MG tablet Take 100 mg by mouth dailyHistorical Med      albuterol sulfate  (90 Base) MCG/ACT inhaler Inhale 2 puffs into the lungs every 6 hours as neededHistorical Med      albuterol (PROVENTIL) (2.5 MG/3ML) 0.083% nebulizer solution Take 3 mLs by nebulization every 6 hours as needed for Wheezing., Disp-120 each, R-3      budesonide-formoterol (SYMBICORT) 160-4.5 MCG/ACT AERO Inhale 2 puffs into the lungs 2 times daily. , Disp-1 Inhaler, R-3      nicotine (NICODERM CQ) 14 MG/24HR Place 1 patch onto the skin daily. , Disp-30 patch, R-3         STOP taking these medications       furosemide (LASIX) 40 MG tablet Comments:   Reason for Stopping:               Activity: activity as tolerated    Diet: renal diet    Follow-up:    Elizabeth Allan MD  [address] [facility]    In 1 week      Tanner Jordan MD  [address]  [address] [facility]  [phone]    In 1 month  for cystoscopy    Amanda Romero DO  [facility]   [address] [place] [address]  [phone]    Schedule an appointment as soon as possible for a visit today  For follow-up concerning new diabetic diagnosis    SCHEDULE, [facility]            Follow up labs: BMP in 1week    Follow up imaging: None    Note that over 30 minutes was spent in preparing discharge papers, discussing discharge with patient, medication review, etc.      Feliciano Bence, MD         Department of [facility] [place] [address], [place]         5/12/2019, 7:37 PM    Attending Physician Statement  I have discussed the care of Rosio Velásquez and I have examined the patient myselft and taken ros and hpi , including pertinent history and exam findings,  with the resident. I have reviewed the key elements of all parts of the encounter with the resident. I agree with the assessment, plan and orders as documented by the resident.       Electronically signed by Alvaro Aleman MD

## 2019-05-17 ENCOUNTER — HOSPITAL ENCOUNTER (OUTPATIENT)
Dept: OTHER | Age: 44
Discharge: HOME | End: 2019-05-17
Payer: MEDICARE

## 2019-05-17 VITALS
DIASTOLIC BLOOD PRESSURE: 70 MMHG | BODY MASS INDEX: 77.81 KG/M2 | HEART RATE: 75 BPM | SYSTOLIC BLOOD PRESSURE: 148 MMHG | WEIGHT: 293 LBS | RESPIRATION RATE: 18 BRPM

## 2019-05-17 PROCEDURE — 99212 OFFICE O/P EST SF 10 MIN: CPT

## 2019-05-17 RX ORDER — BUMETANIDE 0.25 MG/ML
2 INJECTION, SOLUTION INTRAMUSCULAR; INTRAVENOUS DAILY
COMMUNITY
End: 2019-07-12 | Stop reason: CLARIF

## 2019-05-17 ASSESSMENT — PAIN DESCRIPTION - PAIN TYPE: TYPE: CHRONIC PAIN

## 2019-05-17 ASSESSMENT — PAIN DESCRIPTION - ORIENTATION: ORIENTATION: RIGHT

## 2019-05-17 ASSESSMENT — PAIN SCALES - GENERAL: PAINLEVEL_OUTOF10: 6

## 2019-05-17 ASSESSMENT — PAIN DESCRIPTION - LOCATION: LOCATION: WRIST;KNEE

## 2019-05-17 NOTE — PROGRESS NOTES
Preventing Falls: After Your Visit  Your Care Instructions  If you are a patient with heart problems you may be at risk for falling because of unsteadiness, dizziness, diabetes, and shortness of breathe. Some of your medicines also may add to your risk. By making your home safer, you can lower your risk of falling. You can make your home safer with a few simple measures. Follow-up care is a key part of your treatment and safety. Be sure to make and go to all appointments, and call your doctor if you are having problems. It's also a good idea to know your test results and keep a list of the medicines you take. How can you care for yourself at home? Taking care of yourself  · Keep your blood sugar at a target level (which you set with your doctor). · Exercise regularly, as approved by your Doctor, to improve your strength, muscle tone, and balance. Walk if you can. Swimming may be a good choice if you cannot walk easily. · Have your vision checked as often as your doctor recommends. It is usually once a year or more often if you have eye problems. · Know the side effects of the medicines you take. Ask your doctor or pharmacist whether the medicines you take can affect your balance. Sleeping pills or sedatives can affect your balance. · Limit the amount of alcohol you drink. Alcohol can impair your balance and other senses. ·   Preventing falls at home  · Remove raised doorway thresholds, throw rugs, and clutter. Repair loose carpet or raised areas in the floor. · Move furniture and electrical cords to keep them out of walking paths. · Use nonskid floor wax, and wipe up spills right away, especially on ceramic tile floors. · If you use a walker or cane, put rubber tips on it. If you use crutches, clean the bottoms of them regularly with an abrasive pad, such as steel wool. · Keep your house well lit, especially Brenda Brooms, and outside walkways.  Use night-lights in areas such as hallways and bathrooms. Add extra light switches or use remote switches (such as switches that go on or off when you clap your hands) to make it easier to turn lights on if you have to get up during the night. · Install sturdy handrails on stairways. Put grab bars near your shower, bathtub, and toilet. · Store household items on low shelves so that you do not have to climb or reach high. Or use a reaching device that you can get at a medical supply store. If you have to climb for something, use a step stool with handrails, or ask someone to get it for you. · Keep a cordless phone and a flashlight with new batteries by your bed. If possible, put a phone in each of the main rooms of your house, or carry a cell phone in case you fall and cannot reach a phone. Or you can wear a device around your neck or wrist. You push a button that sends a signal for help. · Wear low-heeled shoes that fit well and give your feet good support. Use footwear with nonskid soles. Check the heels and soles of your shoes for wear. Repair or replace worn heels or soles. · Do not wear socks without shoes on wood floors. · Walk on the grass when the sidewalks are slippery. If you live in an area that gets snow and ice in the winter, sprinkle salt on slippery steps and sidewalks. Where can you learn more? Go to https://[employer]. org and sign in to your [employer] account. Enter J099 in the [facility] box to learn more about Diabetes and Preventing Falls: After Your Visit.     If you do not have an account, please click on the Sign Up Now link. © 6162-5830 Healthwise, Incorporated. Care instructions adapted under license by [facility].  This care instruction is for use with your licensed healthcare professional. If you have questions about a medical condition or this instruction, always ask your healthcare professional. [place] [address] any warranty or liability for your use of this information. Content Version: 22.4.646150;  Last Revised: August 6, 2013

## 2019-05-17 NOTE — PROGRESS NOTES
Date:  2019  Time:  3:30 PM    [facility] at [address] [place] [address]    Office: [phone] Fax: [phone]    Re:  Ryanne Olmos   Patient : 1975    Vital Signs: BP (!) 148/70   Pulse 75   Resp 18   Wt (!) 398 lb 6.4 oz (180.7 kg)   BMI 77.81 kg/m²                                                   No results for input(s): CBC, HGB, HCT, WBC, PLATELET, NA, K, CL, CO2, BUN, CREATININE, GLUCOSE, BNP, INR in the last 72 hours. Respiratory:    Assessment  Charting Type: Reassessment    Breath Sounds  Right Upper Lobe: Clear  Right Middle Lobe: Clear  Right Lower Lobe: Clear  Left Upper Lobe: Clear  Left Lower Lobe: Clear    Cough/Sputum  Sputum How Obtained: None         Peripheral Vascular  RLE Edema: Non-pitting  LLE Edema: Non-pitting      Complaints: Chronic right wrist and knee pain. Physician Orders     Comment : Pt arrived to clinic via wheelchair. Pt was A/Ox3 and showed no S/S of labored breathing. Pt wore 3 liters of O2 at all times-baseline. Vitals stable. Weight is decreased by 8 pounds from last visit. Medication profile reviewed and updated. Assessment completed. Low sodium diet and fluid limit education reinforced. Pt educated on food choices and blood sugar checks secondary to new diagnosis of DM. Pt. verbalized understanding. Pt stated she is able to correctly perform finger sticks. Next appointment made.       Electronically signed by Awa Wong RN on 2019 at 3:30 PM

## 2019-05-21 ENCOUNTER — TELEPHONE (OUTPATIENT)
Dept: UROLOGY | Age: 44
End: 2019-05-21

## 2019-05-21 LAB — STATUS: NORMAL

## 2019-05-31 RX ORDER — DIAZEPAM 2 MG/1
2 TABLET ORAL 2 TIMES DAILY PRN
Refills: 0 | COMMUNITY
Start: 2019-04-12 | End: 2019-07-25 | Stop reason: ALTCHOICE

## 2019-05-31 RX ORDER — LORAZEPAM 2 MG/1
2 TABLET ORAL 2 TIMES DAILY PRN
Refills: 0 | Status: ON HOLD | COMMUNITY
Start: 2019-03-29 | End: 2022-01-01 | Stop reason: HOSPADM

## 2019-05-31 RX ORDER — TRAZODONE HYDROCHLORIDE 50 MG/1
TABLET ORAL
Refills: 0 | COMMUNITY
Start: 2019-05-14 | End: 2019-10-28 | Stop reason: ALTCHOICE

## 2019-05-31 RX ORDER — TIZANIDINE 4 MG/1
TABLET ORAL
COMMUNITY
End: 2019-10-28 | Stop reason: ALTCHOICE

## 2019-05-31 RX ORDER — CHOLECALCIFEROL (VITAMIN D3) 125 MCG
CAPSULE ORAL
Refills: 5 | Status: ON HOLD | COMMUNITY
Start: 2019-05-07 | End: 2022-01-01 | Stop reason: HOSPADM

## 2019-05-31 RX ORDER — TORSEMIDE 20 MG/1
TABLET ORAL
Refills: 5 | COMMUNITY
Start: 2019-05-07 | End: 2019-10-28 | Stop reason: SDUPTHER

## 2019-06-03 RX ORDER — TRAZODONE HYDROCHLORIDE 50 MG/1
TABLET ORAL
Qty: 30 TABLET | Refills: 0 | Status: SHIPPED | OUTPATIENT
Start: 2019-06-03 | End: 2019-07-01 | Stop reason: SDUPTHER

## 2019-06-03 NOTE — TELEPHONE ENCOUNTER
Dr Eleno Babinski, patient is current and due in for an appointment on 6/24/19. She has cancelled her last two appointments. Per last dictation patient is on this medication. Please sign for refill if ok. Thank you.

## 2019-06-04 ENCOUNTER — ANESTHESIA EVENT (OUTPATIENT)
Dept: OPERATING ROOM | Age: 44
End: 2019-06-04
Payer: MEDICARE

## 2019-06-05 ENCOUNTER — ANESTHESIA (OUTPATIENT)
Dept: OPERATING ROOM | Age: 44
End: 2019-06-05
Payer: MEDICARE

## 2019-06-05 ENCOUNTER — HOSPITAL ENCOUNTER (OUTPATIENT)
Age: 44
Setting detail: OUTPATIENT SURGERY
Discharge: HOME | End: 2019-06-05
Attending: UROLOGY | Admitting: UROLOGY
Payer: MEDICARE

## 2019-06-05 VITALS
HEIGHT: 60 IN | SYSTOLIC BLOOD PRESSURE: 115 MMHG | WEIGHT: 293 LBS | DIASTOLIC BLOOD PRESSURE: 85 MMHG | BODY MASS INDEX: 57.52 KG/M2 | HEART RATE: 81 BPM | TEMPERATURE: 97.9 F | RESPIRATION RATE: 18 BRPM | OXYGEN SATURATION: 99 %

## 2019-06-05 VITALS — SYSTOLIC BLOOD PRESSURE: 137 MMHG | OXYGEN SATURATION: 98 % | DIASTOLIC BLOOD PRESSURE: 48 MMHG

## 2019-06-05 LAB
GLUCOSE BLD-MCNC: 133 MG/DL (ref 74–100)
HCG, PREGNANCY URINE (POC): NEGATIVE
POC POTASSIUM: 3.3 MMOL/L (ref 3.5–4.5)

## 2019-06-05 PROCEDURE — 3600000012 HC SURGERY LEVEL 2 ADDTL 15MIN: Performed by: UROLOGY

## 2019-06-05 PROCEDURE — 7100000041 HC SPAR PHASE II RECOVERY - ADDTL 15 MIN: Performed by: UROLOGY

## 2019-06-05 PROCEDURE — 6360000002 HC RX W HCPCS: Performed by: NURSE ANESTHETIST, CERTIFIED REGISTERED

## 2019-06-05 PROCEDURE — 2580000003 HC RX 258: Performed by: NURSE ANESTHETIST, CERTIFIED REGISTERED

## 2019-06-05 PROCEDURE — 6360000002 HC RX W HCPCS: Performed by: ANESTHESIOLOGY

## 2019-06-05 PROCEDURE — 84703 CHORIONIC GONADOTROPIN ASSAY: CPT

## 2019-06-05 PROCEDURE — 7100000040 HC SPAR PHASE II RECOVERY - FIRST 15 MIN: Performed by: UROLOGY

## 2019-06-05 PROCEDURE — 2500000003 HC RX 250 WO HCPCS: Performed by: NURSE ANESTHETIST, CERTIFIED REGISTERED

## 2019-06-05 PROCEDURE — 3600000002 HC SURGERY LEVEL 2 BASE: Performed by: UROLOGY

## 2019-06-05 PROCEDURE — 82947 ASSAY GLUCOSE BLOOD QUANT: CPT

## 2019-06-05 PROCEDURE — 2709999900 HC NON-CHARGEABLE SUPPLY: Performed by: UROLOGY

## 2019-06-05 PROCEDURE — 84132 ASSAY OF SERUM POTASSIUM: CPT

## 2019-06-05 PROCEDURE — 3700000000 HC ANESTHESIA ATTENDED CARE: Performed by: UROLOGY

## 2019-06-05 PROCEDURE — 3700000001 HC ADD 15 MINUTES (ANESTHESIA): Performed by: UROLOGY

## 2019-06-05 RX ORDER — PROPOFOL 10 MG/ML
INJECTION, EMULSION INTRAVENOUS PRN
Status: DISCONTINUED | OUTPATIENT
Start: 2019-06-05 | End: 2019-06-05 | Stop reason: SDUPTHER

## 2019-06-05 RX ORDER — KETAMINE HYDROCHLORIDE 10 MG/ML
INJECTION, SOLUTION INTRAMUSCULAR; INTRAVENOUS PRN
Status: DISCONTINUED | OUTPATIENT
Start: 2019-06-05 | End: 2019-06-05 | Stop reason: SDUPTHER

## 2019-06-05 RX ORDER — ALBUTEROL SULFATE 2.5 MG/3ML
2.5 SOLUTION RESPIRATORY (INHALATION) ONCE
Status: COMPLETED | OUTPATIENT
Start: 2019-06-05 | End: 2019-06-05

## 2019-06-05 RX ORDER — SODIUM CHLORIDE 0.9 % (FLUSH) 0.9 %
10 SYRINGE (ML) INJECTION PRN
Status: DISCONTINUED | OUTPATIENT
Start: 2019-06-05 | End: 2019-06-05 | Stop reason: HOSPADM

## 2019-06-05 RX ORDER — FENTANYL CITRATE 50 UG/ML
25 INJECTION, SOLUTION INTRAMUSCULAR; INTRAVENOUS EVERY 5 MIN PRN
Status: DISCONTINUED | OUTPATIENT
Start: 2019-06-05 | End: 2019-06-05 | Stop reason: HOSPADM

## 2019-06-05 RX ORDER — SODIUM CHLORIDE 0.9 % (FLUSH) 0.9 %
10 SYRINGE (ML) INJECTION EVERY 12 HOURS SCHEDULED
Status: DISCONTINUED | OUTPATIENT
Start: 2019-06-05 | End: 2019-06-05 | Stop reason: HOSPADM

## 2019-06-05 RX ORDER — PROPOFOL 10 MG/ML
INJECTION, EMULSION INTRAVENOUS CONTINUOUS PRN
Status: DISCONTINUED | OUTPATIENT
Start: 2019-06-05 | End: 2019-06-05 | Stop reason: SDUPTHER

## 2019-06-05 RX ORDER — LIDOCAINE HYDROCHLORIDE 10 MG/ML
INJECTION, SOLUTION INFILTRATION; PERINEURAL PRN
Status: DISCONTINUED | OUTPATIENT
Start: 2019-06-05 | End: 2019-06-05 | Stop reason: SDUPTHER

## 2019-06-05 RX ORDER — BUMETANIDE 1 MG/1
2.5 TABLET ORAL 2 TIMES DAILY
COMMUNITY
End: 2019-07-25 | Stop reason: DRUGHIGH

## 2019-06-05 RX ORDER — FENTANYL CITRATE 50 UG/ML
INJECTION, SOLUTION INTRAMUSCULAR; INTRAVENOUS PRN
Status: DISCONTINUED | OUTPATIENT
Start: 2019-06-05 | End: 2019-06-05 | Stop reason: SDUPTHER

## 2019-06-05 RX ORDER — MIDAZOLAM HYDROCHLORIDE 1 MG/ML
INJECTION INTRAMUSCULAR; INTRAVENOUS PRN
Status: DISCONTINUED | OUTPATIENT
Start: 2019-06-05 | End: 2019-06-05 | Stop reason: SDUPTHER

## 2019-06-05 RX ORDER — SODIUM CHLORIDE, SODIUM LACTATE, POTASSIUM CHLORIDE, CALCIUM CHLORIDE 600; 310; 30; 20 MG/100ML; MG/100ML; MG/100ML; MG/100ML
INJECTION, SOLUTION INTRAVENOUS CONTINUOUS PRN
Status: DISCONTINUED | OUTPATIENT
Start: 2019-06-05 | End: 2019-06-05 | Stop reason: SDUPTHER

## 2019-06-05 RX ADMIN — PROPOFOL 20 MG: 10 INJECTION, EMULSION INTRAVENOUS at 11:18

## 2019-06-05 RX ADMIN — FENTANYL CITRATE 25 MCG: 50 INJECTION INTRAMUSCULAR; INTRAVENOUS at 11:25

## 2019-06-05 RX ADMIN — FENTANYL CITRATE 25 MCG: 50 INJECTION INTRAMUSCULAR; INTRAVENOUS at 11:12

## 2019-06-05 RX ADMIN — KETAMINE HYDROCHLORIDE 10 MG: 10 INJECTION INTRAMUSCULAR; INTRAVENOUS at 11:21

## 2019-06-05 RX ADMIN — KETAMINE HYDROCHLORIDE 30 MG: 10 INJECTION INTRAMUSCULAR; INTRAVENOUS at 11:12

## 2019-06-05 RX ADMIN — PROPOFOL 50 MG: 10 INJECTION, EMULSION INTRAVENOUS at 11:12

## 2019-06-05 RX ADMIN — LIDOCAINE HYDROCHLORIDE 50 MG: 10 INJECTION, SOLUTION INFILTRATION; PERINEURAL at 11:12

## 2019-06-05 RX ADMIN — SODIUM CHLORIDE, POTASSIUM CHLORIDE, SODIUM LACTATE AND CALCIUM CHLORIDE: 600; 310; 30; 20 INJECTION, SOLUTION INTRAVENOUS at 11:12

## 2019-06-05 RX ADMIN — PROPOFOL 30 MG: 10 INJECTION, EMULSION INTRAVENOUS at 11:29

## 2019-06-05 RX ADMIN — ALBUTEROL SULFATE 2.5 MG: 2.5 SOLUTION RESPIRATORY (INHALATION) at 09:50

## 2019-06-05 RX ADMIN — FENTANYL CITRATE 25 MCG: 50 INJECTION INTRAMUSCULAR; INTRAVENOUS at 11:29

## 2019-06-05 RX ADMIN — MIDAZOLAM HYDROCHLORIDE 2 MG: 1 INJECTION, SOLUTION INTRAMUSCULAR; INTRAVENOUS at 11:12

## 2019-06-05 RX ADMIN — FENTANYL CITRATE 25 MCG: 50 INJECTION INTRAMUSCULAR; INTRAVENOUS at 11:18

## 2019-06-05 RX ADMIN — PROPOFOL 50 MCG/KG/MIN: 10 INJECTION, EMULSION INTRAVENOUS at 11:12

## 2019-06-05 RX ADMIN — PROPOFOL 30 MG: 10 INJECTION, EMULSION INTRAVENOUS at 11:22

## 2019-06-05 RX ADMIN — KETAMINE HYDROCHLORIDE 20 MG: 10 INJECTION INTRAMUSCULAR; INTRAVENOUS at 11:16

## 2019-06-05 RX ADMIN — PROPOFOL 20 MG: 10 INJECTION, EMULSION INTRAVENOUS at 11:25

## 2019-06-05 ASSESSMENT — PULMONARY FUNCTION TESTS
PIF_VALUE: 1
PIF_VALUE: 1
PIF_VALUE: 0
PIF_VALUE: 1
PIF_VALUE: 0
PIF_VALUE: 1
PIF_VALUE: 0
PIF_VALUE: 1
PIF_VALUE: 0
PIF_VALUE: 1
PIF_VALUE: 0
PIF_VALUE: 0
PIF_VALUE: 1
PIF_VALUE: 0
PIF_VALUE: 1
PIF_VALUE: 0
PIF_VALUE: 0
PIF_VALUE: 1
PIF_VALUE: 1
PIF_VALUE: 0
PIF_VALUE: 0
PIF_VALUE: 1
PIF_VALUE: 0
PIF_VALUE: 1
PIF_VALUE: 0
PIF_VALUE: 1
PIF_VALUE: 1
PIF_VALUE: 0

## 2019-06-05 ASSESSMENT — PAIN DESCRIPTION - DESCRIPTORS: DESCRIPTORS: ACHING

## 2019-06-05 ASSESSMENT — PAIN - FUNCTIONAL ASSESSMENT: PAIN_FUNCTIONAL_ASSESSMENT: 0-10

## 2019-06-05 ASSESSMENT — PAIN SCALES - GENERAL: PAINLEVEL_OUTOF10: 0

## 2019-06-05 ASSESSMENT — COPD QUESTIONNAIRES: CAT_SEVERITY: SEVERE

## 2019-06-05 NOTE — H&P
Pre-op History and Physical  [address] Anton[address] Williamson PA-C    Patient:  Andrea Finney  MRN: [id number]  YOB: 1975    HISTORY OF PRESENT ILLNESS:     The patient is a 37 y.o. female who presents with hematuria. Here for cystoscopy. Patient's old records, notes and chart reviewed and summarized above. [address] Antonette Williamson PA-C independently reviewed the images and verified the radiology reports from:    No results found. Past Medical History:    Past Medical History:   Diagnosis Date    HIEN (acute kidney injury) ([facility] 5/6/2019    Asthma     CHF     COPD     Diabetes mellitus, new onset ([facility] 5/6/2019    HTN     Hyperlipidemia     Morbid obesity with BMI of 60.0-69.9, adult ([facility]     Neuromuscular disorder ([facility]     Neuropathy Right hand    Pneumonia     Pulmonary hypertension, moderate to severe ([facility] 06/09/2018    Torn meniscus     Wears glasses        Past Surgical History:    Past Surgical History:   Procedure Laterality Date    ABDOMEN SURGERY      Patient had a PEG tube placed 1/2018, removed 4/2018    [address] [place] [address]    CYSTOSCOPY  June 5, 2019    JOINT REPLACEMENT         Medications Prior to Admission:    Prior to Admission medications    Medication Sig Start Date End Date Taking?  Authorizing Provider   diazepam (VALIUM) 2 MG tablet  4/12/19  Yes Historical Provider, MD   LORazepam (ATIVAN) 2 MG tablet  3/29/19  Yes Historical Provider, MD   melatonin 5 MG TABS tablet  5/7/19  Yes Historical Provider, MD   tiZANidine (ZANAFLEX) 4 MG tablet tizanidine 4 mg tablet   Yes Historical Provider, MD   torsemide (DEMADEX) 20 MG tablet  5/7/19  Yes Historical Provider, MD   traZODone (DESYREL) 50 MG tablet  5/14/19  Yes Historical Provider, MD   SITagliptin (JANUVIA) 50 MG tablet Take 1 tablet by mouth daily 5/9/19  Yes Andrew Hyatt MD   glucose monitoring kit (FREESTYLE) monitoring kit 1 kit by Does not apply route daily 5/8/19  Yes Freya Canas MD   blood glucose monitor strips Test three  times a day & as needed for symptoms of irregular blood glucose. 5/8/19  Yes Tana Rico MD   Lancets MISC 1 each by Does not apply route daily 5/8/19  Yes Tana Rico MD   oxyCODONE-acetaminophen (PERCOCET)  MG per tablet Take 1 tablet by mouth every 6 hours as needed for Pain. Yes Historical Provider, MD   metolazone (ZAROXOLYN) 2.5 MG tablet Take 2.5 mg by mouth daily   Yes Historical Provider, MD   isosorbide dinitrate (ISORDIL) 20 MG tablet Take 20 mg by mouth 3 times daily   Yes Historical Provider, MD   spironolactone (ALDACTONE) 25 MG tablet Take 25 mg by mouth daily   Yes Historical Provider, MD   pantoprazole sodium (PROTONIX) 40 MG PACK packet Take 40 mg by mouth every morning (before breakfast)   Yes Historical Provider, MD   atorvastatin (LIPITOR) 40 MG tablet Take 40 mg by mouth nightly   Yes Historical Provider, MD   ferrous sulfate 325 (65 Fe) MG EC tablet Take 325 g by mouth 2 times daily (with meals) 4/20/18  Yes Historical Provider, MD   fluticasone (FLONASE) 50 MCG/ACT nasal spray 1 spray by Nasal route daily   Yes Historical Provider, MD   gabapentin (NEURONTIN) 600 MG tablet Take 800 mg by mouth 3 times daily.     Yes Historical Provider, MD   hydrALAZINE (APRESOLINE) 25 MG tablet Take 75 mg by mouth daily   Yes Historical Provider, MD   loratadine (CLARITIN) 10 MG tablet Take 10 mg by mouth daily   Yes Historical Provider, MD   ondansetron (ZOFRAN-ODT) 4 MG disintegrating tablet Take 4 mg by mouth every 8 hours as needed   Yes Historical Provider, MD   tiotropium (SPIRIVA) 18 MCG inhalation capsule Inhale 1 capsule into the lungs 8/31/17  Yes Historical Provider, MD   sertraline (ZOLOFT) 100 MG tablet Take 100 mg by mouth daily   Yes Historical Provider, MD   albuterol sulfate  (90 Base) MCG/ACT inhaler Inhale 2 puffs into the lungs every 6 hours as needed 8/31/17  Yes Historical Provider, MD   albuterol (PROVENTIL) (2.5 MG/3ML) 0.083% nebulizer Mother         Blood disorder    High Blood Pressure Father     Arthritis Father     Diabetes Sister     Heart Failure Sister     Kidney Disease Sister     High Blood Pressure Brother     Dementia Maternal Grandmother     High Blood Pressure Maternal Grandmother     Dementia Maternal Grandfather     High Blood Pressure Maternal Grandfather     Cancer Paternal Grandmother     Heart Disease Paternal Grandfather     Diabetes Sister     Heart Failure Sister     Other Sister         Intestinal problems    No Known Problems Sister     No Known Problems Son        REVIEW OF SYSTEMS:  Constitutional: negative  Eyes: negative  Respiratory: negative  Cardiovascular: negative  Gastrointestinal: negative  Genitourinary: no acute issues  Musculoskeletal: negative  Skin: negative   Neurological: negative  Hematological/Lymphatic: negative  Psychological: negative    PHYSICAL EXAM:    No data found. Constitutional: Patient in NAD  Neuro: Alert and oriented to person, place, and time  Psych: Mood and affect normal  Skin: Clean, dry, intact   Lungs: Respiratory effort normal, CTA  Cardiovascular:  Normal peripheral pulses; no murmur. Normal rhythm  Abdomen: Soft, non-tender, non-distended, no hepatosplenomegaly or hernia, CVA tenderness none  Bladder: Non-tender and non-disdended   : Non-tender, skin intact, no lesions       LABS:   No results for input(s): WBC, HGB, HCT, MCV, PLT in the last 72 hours. No results for input(s): NA, K, CL, CO2, PHOS, BUN, CREATININE in the last 72 hours. Invalid input(s): CA  No results found for: PSA      Urinalysis: No results for input(s): COLORU, PHUR, LABCAST, WBCUA, RBCUA, MUCUS, TRICHOMONAS, YEAST, BACTERIA, CLARITYU, SPECGRAV, LEUKOCYTESUR, UROBILINOGEN, [place] in the last 72 hours.     Invalid input(s): Rosa Stephens     -----------------------------------------------------------------    ASSESSMENT AND PLAN:    Impression: Hematuria    Plan: Cysto under MAC in OR today.     Consent obtained    Lizzeth Perla PA-C  9:36 AM 6/5/2019

## 2019-06-05 NOTE — ANESTHESIA PRE PROCEDURE
Provider, MD   ferrous sulfate 325 (65 Fe) MG EC tablet Take 325 g by mouth 2 times daily (with meals) 4/20/18  Yes Historical Provider, MD   fluticasone (FLONASE) 50 MCG/ACT nasal spray 1 spray by Nasal route daily   Yes Historical Provider, MD   gabapentin (NEURONTIN) 600 MG tablet Take 800 mg by mouth 3 times daily. Yes Historical Provider, MD   hydrALAZINE (APRESOLINE) 25 MG tablet Take 75 mg by mouth daily   Yes Historical Provider, MD   loratadine (CLARITIN) 10 MG tablet Take 10 mg by mouth daily   Yes Historical Provider, MD   ondansetron (ZOFRAN-ODT) 4 MG disintegrating tablet Take 4 mg by mouth every 8 hours as needed   Yes Historical Provider, MD   tiotropium (SPIRIVA) 18 MCG inhalation capsule Inhale 1 capsule into the lungs 8/31/17  Yes Historical Provider, MD   sertraline (ZOLOFT) 100 MG tablet Take 100 mg by mouth daily   Yes Historical Provider, MD   albuterol sulfate  (90 Base) MCG/ACT inhaler Inhale 2 puffs into the lungs every 6 hours as needed 8/31/17  Yes Historical Provider, MD   albuterol (PROVENTIL) (2.5 MG/3ML) 0.083% nebulizer solution Take 3 mLs by nebulization every 6 hours as needed for Wheezing. 9/24/14  Yes Niecy Mistry MD   budesonide-formoterol (SYMBICORT) 160-4.5 MCG/ACT AERO Inhale 2 puffs into the lungs 2 times daily. 9/24/14  Yes Constantino Willis MD       Current medications:    Current Facility-Administered Medications   Medication Dose Route Frequency Provider Last Rate Last Dose    fentaNYL (SUBLIMAZE) injection 25 mcg  25 mcg Intravenous Q5 Min PRN Aguilar Sandoval MD           Allergies:     Allergies   Allergen Reactions    Bee Venom Anaphylaxis     Last bee sting when patient was at 11years of age   [facility] Antibiotics Anaphylaxis    Aspirin     Beta Adrenergic Blockers Other (See Comments)       Problem List:    Patient Active Problem List   Diagnosis Code    Asthma J45.909    COPD (chronic obstructive pulmonary disease) ([facility] J44.9    Pneumonia J18.9    HTN (hypertension) I10    Torn meniscus S83.209A    Chest pain R07.9    Pulmonary hypertension, moderate to severe (HCC) I27.20    Morbid obesity with BMI of 60.0-69.9, adult ([facility]) E66.01, Z68.44    Obesity hypoventilation syndrome (HCC) E66.2    H/O tracheostomy Z98.890    STEPH (obstructive sleep apnea) G47.33    Right heart failure, unspecified ([facility]) I50.810    Acute on chronic diastolic heart failure (HCC) I50.33    Acute on chronic congestive heart failure (HCC) I50.9    Diabetes mellitus, new onset ([facility]) E11.9    HIEN (acute kidney injury) ([facility]) N17.9    Dizziness R42    Normocytic anemia D64.9       Past Medical History:        Diagnosis Date    [facility]acute [facility]) [facility] 2019    Asthma     CHF     COPD     Diabetes mellitus, new onset ([facility] 2019    HTN     Hyperlipidemia     Morbid obesity with BMI of 60.0-69.9, adult ([facility]     Neuromuscular disorder ([facility])     Neuropathy Right hand    Pneumonia     Pulmonary hypertension, moderate to severe ([facility] 2018    Torn meniscus     Wears glasses        Past Surgical History:        Procedure Laterality Date    ABDOMEN SURGERY      Patient had a PEG tube placed 2018, removed 2018    [address]    CYSTOSCOPY  2019    JOINT REPLACEMENT         Social History:    Social History     Tobacco Use    Smoking status: Former Smoker     Packs/day: 1.00     Years: 15.00     Pack years: 15.00     Last attempt to quit: 2018     Years since quittin.4    Smokeless tobacco: Never Used   Substance Use Topics    Alcohol use:  No                                Counseling given: Not Answered      Vital Signs (Current):   Vitals:    19 1342   Weight: (!) 395 lb (179.2 kg)   Height: 5' (1.524 m)                                              BP Readings from Last 3 Encounters:   19 (!) 106/55   19 (!) 143/65   10/31/18 (!) 171/66       NPO Status: BMI:   Wt Readings from Last 3 Encounters:   05/31/19 (!) 395 lb (179.2 kg)   05/09/19 (!) 394 lb 11.2 oz (179 kg)   04/08/19 (!) 385 lb (174.6 kg)     Body mass index is 77.14 kg/m². CBC:   Lab Results   Component Value Date    WBC 6.8 05/07/2019    RBC 3.22 05/07/2019    HGB 8.8 05/07/2019    HCT 30.0 05/07/2019    MCV 93.2 05/07/2019    RDW 13.5 05/07/2019     05/07/2019       CMP:   Lab Results   Component Value Date     05/09/2019    K 3.6 05/09/2019    CL 87 05/09/2019    CO2 37 05/09/2019    BUN 32 05/09/2019    CREATININE 1.20 05/09/2019    GFRAA 59 05/09/2019    LABGLOM 49 05/09/2019    GLUCOSE 221 05/09/2019    PROT 7.9 05/07/2019    CALCIUM 9.5 05/09/2019    BILITOT 0.46 02/24/2018    ALKPHOS 96 02/24/2018    AST 37 02/24/2018    ALT 69 02/24/2018       POC Tests: No results for input(s): POCGLU, POCNA, POCK, POCCL, POCBUN, POCHEMO, POCHCT in the last 72 hours. Coags: No results found for: PROTIME, INR, APTT    HCG (If Applicable): No results found for: PREGTESTUR, PREGSERUM, HCG, HCGQUANT     ABGs: No results found for: PHART, PO2ART, WIU6QMN, OYC4HXN, BEART, F7XNFODC     Type & Screen (If Applicable):  No results found for: LABABO, [address]    Anesthesia Evaluation  Patient summary reviewed and Nursing notes reviewed no history of anesthetic complications:   Airway: Mallampati: III  TM distance: >3 FB   Neck ROM: full  Mouth opening: > = 3 FB and < 3 FB Dental: normal exam         Pulmonary:   (+) pneumonia: resolved,  COPD: severe,  sleep apnea: on CPAP,  rhonchi,  asthma:                            Cardiovascular:  Exercise tolerance: poor (<4 METS),   (+) hypertension: moderate, dysrhythmias: ventricular tachycardia, CHF:, orthopnea, pulmonary hypertension: moderate,       ECG reviewed  Rhythm: regular  Rate: normal  Echocardiogram reviewed  Stress test reviewed             ROS comment: Summary  Technically difficult study.   Left ventricle is normal in size. Mild concentric LVH Global left  ventricular systolic function appears hyperdynamic Calculated ejection  fraction is 75% . Mild left ventricular hypertrophy. Moderately dilated right ventricle size and severely decreased systolic  function. Estimated right ventricular systolic pressure is 44 mmHg. Moderate pulmonary hypertension. mildly elevated RA filling pressure . Neuro/Psych:   (+) neuromuscular disease:,             GI/Hepatic/Renal:   (+) PUD, morbid obesity          Endo/Other:    (+) Diabetes, : arthritis:., .                 Abdominal:   (+) obese,     Abdomen: soft. Vascular:                                        Anesthesia Plan      MAC     ASA 4       Induction: intravenous. MIPS: Postoperative opioids intended and Prophylactic antiemetics administered. Anesthetic plan and risks discussed with patient. Plan discussed with CRNA.     Attending anesthesiologist reviewed and agrees with [address] Zahira Williamson MD   6/5/2019

## 2019-06-05 NOTE — ANESTHESIA POSTPROCEDURE EVALUATION
Department of Anesthesiology  Postprocedure Note    Patient: Manny Hopson  MRN: [id number]  YOB: 1975  Date of evaluation: 6/5/2019  Time:  1:53 PM     Procedure Summary     Date:  06/05/19 Room / Location:  [facility] OR  / [facility] OR    Anesthesia Start:  1040 Anesthesia Stop:  1140    Procedure:  CYSTOSCOPY (N/A Vagina ) Diagnosis:  (URGENT INCONTINENCE , HEMATURIA)    Surgeon:  Jose Luis Gutierrez MD Responsible Provider:  Margie Joe MD    Anesthesia Type:  MAC ASA Status:  4          Anesthesia Type: MAC    Stanislaw Phase I:      Stanislaw Phase II: Stanislaw Score: 10    Last vitals: Reviewed and per EMR flowsheets.        Anesthesia Post Evaluation    Patient location during evaluation: PACU  Patient participation: complete - patient participated  Level of consciousness: awake and alert  Pain score: 2  Airway patency: patent  Nausea & Vomiting: no nausea and no vomiting  Complications: no  Cardiovascular status: hemodynamically stable  Respiratory status: acceptable  Hydration status: euvolemic

## 2019-06-05 NOTE — OP NOTE
Dr. Gwen Carrington MD      [address] [place]  06/05/19    Patient:  Starla Jones  MRN: [id number]  YOB: 1975    Surgeon: Dr. Gwen Carrington MD  Assistant: Dr. Elier Millan MD    Pre-op Diagnosis: Gross hematuria. Negative CT urogram.  Post-op Diagnosis: Same    Procedure:   Cystoscopy. Anesthesia: Monitor Anesthesia Care  Complications: None  OR Blood Loss: Minimal  Fluids: Cystalloids  Specimens: None    Indication:  Mrs. Socrates Madrigal is a pleasant 51-year-old female who was noted to have gross hematuria. She underwent a CT urogram study which was unremarkable any abnormalities. In order to complete her hematuria workup she is taken back to the operating room for a cystoscopy. Narrative of the Procedure:    After informed consent was obtained in the preoperative area, the patient was taken back to the operating room and transferred to the operating table in supine position. EPC cuffs were placed. The machine was turned on. Anesthesia was induced and antibiotics were given. The patient was prepped and draped in a sterile manner. A time out occurred in which two patient identifiers were used. The rigid scope was carefully placed into the bladder. Findings:  Urethra: Normal.  No lesion or stenosis. Bladder Neck: Normal.  No squamous metaplasia noted. Papillary lesions: None  Trabeculations: None  Cellules/Diverticula: None  Bladder Stones: None  Ureteral Orifices: Normal position with clear reflux bilaterally    OVERALL IMPRESSION: Completely normal cystoscopy. Follow-Up: She will follow up with us in 6 months with microscopic urinalysis.   This completes her hematuria workup with a negative cystoscopy and negative CT urogram.    Gwen Carrington  Electronically signed on 6/5/2019 at 11:33 AM

## 2019-06-24 ENCOUNTER — OFFICE VISIT (OUTPATIENT)
Dept: PULMONOLOGY | Age: 44
End: 2019-06-24
Payer: MEDICARE

## 2019-06-24 VITALS
WEIGHT: 293 LBS | RESPIRATION RATE: 14 BRPM | HEART RATE: 74 BPM | OXYGEN SATURATION: 99 % | BODY MASS INDEX: 57.52 KG/M2 | SYSTOLIC BLOOD PRESSURE: 109 MMHG | DIASTOLIC BLOOD PRESSURE: 66 MMHG | HEIGHT: 60 IN

## 2019-06-24 DIAGNOSIS — R73.9 HYPERGLYCEMIA: ICD-10-CM

## 2019-06-24 DIAGNOSIS — J96.11 CHRONIC RESPIRATORY FAILURE WITH HYPOXIA, ON HOME O2 THERAPY (HCC): ICD-10-CM

## 2019-06-24 DIAGNOSIS — Z99.81 CHRONIC RESPIRATORY FAILURE WITH HYPOXIA, ON HOME O2 THERAPY (HCC): ICD-10-CM

## 2019-06-24 DIAGNOSIS — I10 ESSENTIAL HYPERTENSION: ICD-10-CM

## 2019-06-24 DIAGNOSIS — E66.01 MORBID OBESITY (HCC): Primary | ICD-10-CM

## 2019-06-24 DIAGNOSIS — F17.200 SMOKER: ICD-10-CM

## 2019-06-24 DIAGNOSIS — G47.33 OSA ON CPAP: ICD-10-CM

## 2019-06-24 DIAGNOSIS — G47.33 OBSTRUCTIVE SLEEP APNEA: ICD-10-CM

## 2019-06-24 DIAGNOSIS — Z99.89 OSA ON CPAP: ICD-10-CM

## 2019-06-24 PROCEDURE — G8427 DOCREV CUR MEDS BY ELIG CLIN: HCPCS | Performed by: INTERNAL MEDICINE

## 2019-06-24 PROCEDURE — G8417 CALC BMI ABV UP PARAM F/U: HCPCS | Performed by: INTERNAL MEDICINE

## 2019-06-24 PROCEDURE — G8599 NO ASA/ANTIPLAT THER USE RNG: HCPCS | Performed by: INTERNAL MEDICINE

## 2019-06-24 PROCEDURE — 1036F TOBACCO NON-USER: CPT | Performed by: INTERNAL MEDICINE

## 2019-06-24 PROCEDURE — 99214 OFFICE O/P EST MOD 30 MIN: CPT | Performed by: INTERNAL MEDICINE

## 2019-06-24 RX ORDER — SITAGLIPTIN 50 MG/1
100 TABLET, FILM COATED ORAL DAILY
Refills: 5 | COMMUNITY
Start: 2019-06-11

## 2019-06-24 ASSESSMENT — SLEEP AND FATIGUE QUESTIONNAIRES
HOW LIKELY ARE YOU TO NOD OFF OR FALL ASLEEP WHILE LYING DOWN TO REST IN THE AFTERNOON WHEN CIRCUMSTANCES PERMIT: 2
HOW LIKELY ARE YOU TO NOD OFF OR FALL ASLEEP WHILE SITTING AND TALKING TO SOMEONE: 0
ESS TOTAL SCORE: 8
HOW LIKELY ARE YOU TO NOD OFF OR FALL ASLEEP IN A CAR, WHILE STOPPED FOR A FEW MINUTES IN TRAFFIC: 0
HOW LIKELY ARE YOU TO NOD OFF OR FALL ASLEEP WHILE SITTING QUIETLY AFTER LUNCH WITHOUT ALCOHOL: 3
HOW LIKELY ARE YOU TO NOD OFF OR FALL ASLEEP WHILE SITTING INACTIVE IN A PUBLIC PLACE: 0
HOW LIKELY ARE YOU TO NOD OFF OR FALL ASLEEP WHILE SITTING AND READING: 1
HOW LIKELY ARE YOU TO NOD OFF OR FALL ASLEEP WHEN YOU ARE A PASSENGER IN A CAR FOR AN HOUR WITHOUT A BREAK: 0
HOW LIKELY ARE YOU TO NOD OFF OR FALL ASLEEP WHILE WATCHING TV: 2

## 2019-06-25 NOTE — PROGRESS NOTES
REASON FOR THE CONSULTATION:  Respiratory failure. Morbid obesity. Sleep apnea syndrome  HISTORY OF PRESENT ILLNESS:    Twyla Villarreal is a 37y.o. 68-year-old white female who has come for follow-up. This patient is known to have chronic obstructive lung disease related to chronic bronchitis and emphysema secondary to prior smoking which she already has quit. Patient in addition also has sleep apnea syndrome and obesity hypoventilation syndrome producing chronic cor pulmonale and pickwickian syndrome-like picture with heart failure. Patient is not known to have hypoxemia and CO2 retention she is on home oxygen. She uses CPAP very regularly. CPAP has been effective in relieving the apnea and improving her nighttime oxygenation and daytime symptoms. Patient denies any symptoms of acute exacerbation of COPD and no excessive cough or sputum production no hemoptysis no chest pain sputum is mucoid no blood in it. She does have pedal edema no thromboembolic process. She also has systemic hypertension that is under good control with the present therapy. An [place] Sleepiness Scale given to the patient in the office revealed a score of 8 indicating minimal degree of daytime sleepiness. She has no parasomnias. No angina no coronary artery disease no proximal nocturnal dyspnea. She used to complain of insomnia that has resolved with a change in the pressure of the CPAP. He does not have any other symptoms at this time to suggest CO2 retention.    Sleep Medicine 6/24/2019   Sitting and reading 1   Watching TV 2   Sitting, inactive in a public place (e.g. a theatre or a meeting) 0   As a passenger in a car for an hour without a break 0   Lying down to rest in the afternoon when circumstances permit 2   Sitting and talking to someone 0   Sitting quietly after a lunch without alcohol 3   In a car, while stopped for a few minutes in traffic 0   Total score 8     He is in a wheelchair he is using the nasal oxygen  LUNG CANCER SCREENING     1. CRITERIA MET    []     CT ORDERED  []      2. CRITERIA NOT MET   [x]      3. REFUSED                    []        REASON CRITERIA NOT MET     1. SMOKING LESS THAN 30 PY  []      2. AGE LESS THAN 55 or GREATER 77 YEARS  [x]      3. QUIT SMOKING 15 YEARS OR GREATER   []      4. RECENT CT WITH IN 11 MONTHS    []      5. LIFE EXPECTANCY < 5 YEARS   []      6. SIGNS  AND SYMPTOMS OF LUNG CANCER   []         Immunization     There is no immunization history on file for this patient. Pneumococcal Vaccine     [x] Up to date    [] Indicated   [] Refused  [] Contraindicated       Influenza Vaccine   [x] Up to date    [] Indicated   [] Refused  [] Contraindicated          PAST MEDICAL HISTORY:       Diagnosis Date    Acute on chronic diastolic CHF (congestive heart failure) ([facility] 9/15/2018    HIEN (acute kidney injury) ([facility] 11/20/2018    COPD (chronic obstructive pulmonary disease) ([facility]     Hypertension     Pulmonary hypertension ([facility] [facility]          Family History:   History reviewed. No pertinent family history. SURGICAL HISTORY:   Past Surgical History:   Procedure Laterality Date    GASTROSTOMY TUBE PLACEMENT  02/2018    Removed in April 2018    [facility]. CATH POWER PICC TRIPLE  2/2/2018         HI OFFICE/OUTPT VISIT,PROCEDURE ONLY N/A 2/17/2018    TRACHEOTOMY performed by Shey Escalera MD at [address]  03/2018    TRACHEOTOMY  02/2018           SOCIAL AND OCCUPATIONAL HEALTH:      There  is no history of TB or TB exposure. There  is no asbestos or silica dust exposure. The patient reports  no coal, foundry, quarry or [employer] exposure. Travel history reveals negative    There  is no history of recreational or IV drug use. There  is no hot tube exposure. Pets  negative    Occupational history not significant    TOBACCO:   reports that she quit smoking about 17 months ago. Her smoking use included cigarettes.  She started smoking about 24 years ago. She has a 22.00 pack-year smoking history. She has never used smokeless tobacco.  ETOH:   reports that she does not drink alcohol. ALLERGIES:      Allergies   Allergen Reactions    Asa [Aspirin]     Beta Adrenergic Blockers Other (See Comments)     Asystole and Bradycardia on admission  01/26/2018-2/22/2018 at [address] [place] [address]:   Prior to Admission medications    Medication Sig Start Date End Date Taking? Authorizing Provider   traZODone (DESYREL) 50 MG tablet TAKE 1 TABLET AT NIGHT 6/3/19  Yes Jesse Delgado MD   bumetanide (BUMEX) 0.25 MG/ML injection Infuse 2 mg intravenously daily   Yes Historical Provider, MD   melatonin 5 MG TABS tablet  4/15/19  Yes Historical Provider, MD   budesonide-formoterol (SYMBICORT) 160-4.5 MCG/ACT AERO Inhale 2 puffs into the lungs 2 times daily   Yes Historical Provider, MD   metolazone (ZAROXOLYN) 2.5 MG tablet Take 2.5 mg by mouth daily   Yes Historical Provider, MD   pantoprazole (PROTONIX) 20 MG tablet Take 20 mg by mouth daily   Yes Historical Provider, MD   torsemide (DEMADEX) 20 MG tablet Take 1 tablet by mouth 2 times daily 11/21/18  Yes Farhad Garcia MD   spironolactone (ALDACTONE) 25 MG tablet Take 1 tablet by mouth daily 11/22/18  Yes Farhad Garcai MD   LORazepam (ATIVAN) 1 MG tablet Take 1 mg by mouth every 6 hours as needed for Anxiety. .   Yes Historical Provider, MD   oxyCODONE-acetaminophen (PERCOCET)  MG per tablet Take 1 tablet by mouth every 6 hours as needed for Pain. .   Yes Historical Provider, MD   gabapentin (NEURONTIN) 800 MG tablet Take 800 mg by mouth 3 times daily. .   Yes Historical Provider, MD   isosorbide dinitrate (ISORDIL) 20 MG tablet Take 1 tablet by mouth 3 times daily 9/20/18  Yes Jen Singh MD   hydrALAZINE (APRESOLINE) 25 MG tablet Take 1 tablet by mouth every 8 hours 9/20/18  Yes Jen Singh MD   Blood Pressure KIT 1 Device by Does not apply route 2 times daily 9/20/18  Yes Alfreda Shoemaker MD   sertraline (ZOLOFT) 25 MG tablet Take 75 mg by mouth daily   Yes Historical Provider, MD   atorvastatin (LIPITOR) 40 MG tablet Take 1 tablet by mouth nightly 2/21/18  Yes Andres Johnson MD   tiotropium (SPIRIVA) 18 MCG inhalation capsule Inhale 18 mcg into the lungs daily   Yes Historical Provider, MD   albuterol sulfate  (90 Base) MCG/ACT inhaler Inhale 2 puffs into the lungs every 6 hours as needed for Wheezing   Yes Historical Provider, MD   ferrous sulfate 325 (65 Fe) MG tablet Take 325 mg by mouth 2 times daily (with meals)    Yes Historical Provider, MD   loratadine (CLARITIN) 10 MG capsule Take 10 mg by mouth daily   Yes Historical Provider, MD   fluticasone (FLONASE) 50 MCG/ACT nasal spray 1 spray by Nasal route daily   Yes Historical Provider, MD   JANUVIA 50 MG tablet Take 50 mg by mouth daily 6/11/19   Historical Provider, MD              REVIEW OF SYSTEMS:    CONSTITUTIONAL:  negative for  fevers, chills, sweats, fatigue, malaise, anorexia and weight loss, morbid obesity, no respiratory distress, not using accessory muscles. She is on supplemental oxygen per nasal cannula  EYES:  negative for  double vision, blurred vision, dry eyes, eye discharge and redness. No Angel's syndrome. No jaundice  HEENT:  negative for  hearing loss, tinnitus, ear drainage, earaches and nasal congestion. No nasal polyps. No sinus tenderness  RESPIRATORY:  See hpi  CARDIOVASCULAR:  negative for  chest pain,, palpitations, orthopnea, PND, bilateral pedal edema due to heart failure. JVD cannot be appreciated because of short thick neck. Has history of hypertension, recurrent heart failure due to cor pulmonale and contributed to by heart failure, hypertension  GASTROINTESTINAL:  negative for nausea, vomiting, change in bowel habits, diarrhea, constipation, abdominal pain, pruritus, abdominal mass and abdominal distention. No diarrhea.   No vomiting, no GI bleed  GENITOURINARY: negative for frequency, dysuria, nocturia, urinary incontinence and hesitancy frequent and UTIs  INTEGUMENT  negative for rash, skin lesion(s), dryness, skin color change, changes in lesion, pruritus and changes in hair  HEMATOLOGIC/LYMPHATIC:  negative for easy bruising, bleeding, lymphadenopathy, petechiae and swelling/edema have an anemia  one time which was treated. ALLERGIC/IMMUNOLOGIC:  negative for recurrent infections, urticaria and drug reactions. No hayfever  ENDOCRINE:  negative for heat intolerance, cold intolerance, tremor, weight changes and change in bowel habits. No diabetes. No hypothyroid or hyperthyroid state  MUSCULOSKELETAL:  negative for  myalgias, arthralgias, pain, joint swelling, stiff joints and decreased range of motion no acute arthritis  NEUROLOGICAL:  negative for headaches, dizziness, seizures, memory problems, speech problems, visual disturbance and coordination problems, no neurological deficit  BEHAVIOR/PSYCH:  negative for poor appetite, increased appetite, decreased sleep, increased sleep, decreased energy level, increased energy level and poor concentration. No psychiatry problems  Skin no rash no dermatitis  Vitals:  /66 (Site: Left Upper Arm, Position: Sitting, Cuff Size: Large Adult)   Pulse 74   Resp 14   Ht 5' (1.524 m)   Wt (!) 398 lb (180.5 kg)   SpO2 99% Comment: Room air at rest  BMI 77.73 kg/m²     PHYSICAL EXAM:  General Appearance:  , Morbid obesity, no distress, not using accessory muscle on supplemental oxygen and a wheelchair   Head:    Normocephalic, without obvious abnormality, atraumatic      Eyes:    PERRL, conjunctiva/corneas clear, EOM's intactNo jaundice. No Angel's syndrome   Ears:    Normal  external ear canals, both earsNormal ear exam   Nose:   Nares normal, septum midline, mucosa normal, no drainage        or sinus tendernessNo polyps. No sinus tenderness.   Oropharyngeal orifice is compromised due to redundant tissue   Throat: Lips, mucosa, and tongue normal; teeth and gums normal   Neck:   Supple, symmetrical, trachea midline, no adenopathy;     thyroid:  no enlargement/tenderness/nodules; no carotid    bruit  no elevated JVD Neck is very short and obese have had tracheostomy in the past   Back:     Symmetric, no curvature, ROM normal, no CVA tenderness   Lungs:    Poor air entry was her because of obesity and possible COPD, percussion note is impaired because of obesity, air entry diminished on both sides. No rales, rhonchi are audible   Chest Wall:    No tenderness or deformity      Heart:    Regular rate and rhythm, S1 and S2 normal, no murmur, rub        or gallop no rvh Stent heart sound, no murmurs or gallops                          Abdomen:                                                 Pulses:                              Skin:                  Lymph nodes:                    Neurologic:                  Soft, non-tender, bowel sounds active all four quadrants,     no masses, no organomegaly         2+ and symmetric all extremities     Skin color, texture, turgor normal, no rashes or lesions       Cervical, supraclavicular not enlarged or matted or tender      CNII-XII intact, normal strength 5/5 . Sensation grossly normal  and reflexes normal 2+  throughout     Clubbing No  Lower ext edema 2+   Upper ext edema none        Musculoskeletal no synovitis. No joint swelling or tenderness        CBC: No results for input(s): WBC, HGB, PLT in the last 72 hours. BMP:  No results for input(s): NA, K, CL, CO2, BUN, CREATININE, GLUCOSE in the last 72 hours. Hepatic: No results for input(s): AST, ALT, ALB, BILITOT, ALKPHOS in the last 72 hours. Amylase: No results found for: AMYLASE  Lipase:   Lab Results   Component Value Date    LIPASE 13 11/16/2018     Troponin: No results for input(s): TROPONINI in the last 72 hours. BNP: No results for input(s): BNP in the last 72 hours.   Lipids: No results for input(s): CHOL, HDL in the last 72 hours. Invalid input(s): LDLCALCU  ABGs: No results found for: PHART, PO2ART, XJE4SIQ  INR: No results for input(s): INR in the last 72 hours. Thyroid:   Lab Results   Component Value Date    TSH 1.93 11/17/2018     Urinalysis: No results for input(s): BACTERIA, BLOODU, CLARITYU, COLORU, PHUR, PROTEINU, RBCUA, SPECGRAV, BILIRUBINUR, NITRU, WBCUA, LEUKOCYTESUR, GLUCOSEU in the last 72 hours. Cultures:-  No recent cultures   CXR   Recent chest x-ray CT scan available to review        Echo     No recent echo          IMPRESSION:    Visit Diagnoses       Codes    STEPH on CPAP     G47.33, Z99.89    Obstructive sleep apnea     G47.33    Essential hypertension     I10    Hyperglycemia     R73.9    Chronic respiratory failure with hypoxia, on home O2 therapy (HCC)     J96.11, Z99.81        :                PLAN:      Patient has a chronic history of chronic respiratory failure and overlap syndrome. She used to smoke that caused COPD. Fortunately she has given up smoking. She is morbidly obese also has sleep apnea which is responding well to the use of CPAP that she uses regularly almost 6 hours every night. That keeps apnea under control and also has helped to resolve the insomnia as well. Patient was advised to take antihistaminic if necessary for the insomnia. She using a supplemental oxygen all 24 hours a day. She is getting a portable cylinders. We will arrange for her to have inversion oxygen conversion mechanism which will be more convenient for her. There is no evidence of an acute exacerbation of her COPD. She has evidence of chronic cor pulmonale with 1+ pedal edema no thromboembolic process. Her hypertension is under good control and she will continue the same treatment. Her hypoglycemia is under good control as well. I advised the patient to continue the treatment of her hypertension and glucose intolerance as before.     She already had Pneumovax and influenza vaccine. She is not a candidate for lung cancer screening. We discussed about the possibility of bariatric surgery to lose weight which greatly help her. Patient has expresses desire that she should not be put on any life-sustaining measures if her respiratory failure deteriorates and her condition becomes worse due to the respiratory failure.

## 2019-07-01 RX ORDER — TRAZODONE HYDROCHLORIDE 50 MG/1
TABLET ORAL
Qty: 30 TABLET | Refills: 2 | Status: SHIPPED | OUTPATIENT
Start: 2019-07-01 | End: 2019-09-20 | Stop reason: SDUPTHER

## 2019-07-10 ENCOUNTER — TELEPHONE (OUTPATIENT)
Dept: OTHER | Age: 44
End: 2019-07-10

## 2019-07-12 ENCOUNTER — HOSPITAL ENCOUNTER (OUTPATIENT)
Dept: OTHER | Age: 44
Discharge: HOME | End: 2019-07-12
Payer: MEDICARE

## 2019-07-12 VITALS
WEIGHT: 293 LBS | RESPIRATION RATE: 18 BRPM | OXYGEN SATURATION: 99 % | SYSTOLIC BLOOD PRESSURE: 122 MMHG | DIASTOLIC BLOOD PRESSURE: 67 MMHG | HEART RATE: 88 BPM | BODY MASS INDEX: 78.67 KG/M2

## 2019-07-12 PROCEDURE — 99212 OFFICE O/P EST SF 10 MIN: CPT | Performed by: NURSE PRACTITIONER

## 2019-07-12 RX ORDER — BUMETANIDE 1 MG/1
2 TABLET ORAL 2 TIMES DAILY
COMMUNITY

## 2019-07-12 ASSESSMENT — PAIN DESCRIPTION - PAIN TYPE
TYPE_2: NEUROPATHIC
TYPE_3: CHRONIC PAIN
TYPE: CHRONIC PAIN;NEUROPATHIC PAIN

## 2019-07-12 ASSESSMENT — PAIN DESCRIPTION - LOCATION
LOCATION_3: HIP
LOCATION: WRIST
LOCATION_2: KNEE

## 2019-07-12 ASSESSMENT — PAIN DESCRIPTION - ORIENTATION
ORIENTATION_3: LEFT
ORIENTATION: RIGHT
ORIENTATION_2: RIGHT

## 2019-07-12 ASSESSMENT — PAIN DESCRIPTION - INTENSITY
RATING_3: 4
RATING_2: 5

## 2019-07-12 ASSESSMENT — PAIN SCALES - GENERAL: PAINLEVEL_OUTOF10: 7

## 2019-07-12 ASSESSMENT — PAIN DESCRIPTION - DESCRIPTORS: DESCRIPTORS_3: SHARP;ACHING

## 2019-07-12 NOTE — PROGRESS NOTES
bathrooms. Add extra light switches or use remote switches (such as switches that go on or off when you clap your hands) to make it easier to turn lights on if you have to get up during the night. · Install sturdy handrails on stairways. Put grab bars near your shower, bathtub, and toilet. · Store household items on low shelves so that you do not have to climb or reach high. Or use a reaching device that you can get at a medical supply store. If you have to climb for something, use a step stool with handrails, or ask someone to get it for you. · Keep a cordless phone and a flashlight with new batteries by your bed. If possible, put a phone in each of the main rooms of your house, or carry a cell phone in case you fall and cannot reach a phone. Or you can wear a device around your neck or wrist. You push a button that sends a signal for help. · Wear low-heeled shoes that fit well and give your feet good support. Use footwear with nonskid soles. Check the heels and soles of your shoes for wear. Repair or replace worn heels or soles. · Do not wear socks without shoes on wood floors. · Walk on the grass when the sidewalks are slippery. If you live in an area that gets snow and ice in the winter, sprinkle salt on slippery steps and sidewalks. Where can you learn more? Go to https://Caviarvasile.Echograph. org and sign in to your [employer] account. Enter L011 in the [facility] box to learn more about Diabetes and Preventing Falls: After Your Visit.     If you do not have an account, please click on the Sign Up Now link. © 7702-5133 Healthwise, Incorporated. Care instructions adapted under license by [facility].  This care instruction is for use with your licensed healthcare professional. If you have questions about a medical condition or this instruction, always ask your healthcare professional. [place] [address] any warranty or liability for your use of this

## 2019-08-05 ENCOUNTER — APPOINTMENT (OUTPATIENT)
Dept: GENERAL RADIOLOGY | Age: 44
End: 2019-08-05
Payer: MEDICARE

## 2019-08-05 ENCOUNTER — HOSPITAL ENCOUNTER (EMERGENCY)
Age: 44
Discharge: HOME | End: 2019-08-06
Attending: EMERGENCY MEDICINE
Payer: MEDICARE

## 2019-08-05 VITALS
HEIGHT: 60 IN | SYSTOLIC BLOOD PRESSURE: 118 MMHG | HEART RATE: 89 BPM | WEIGHT: 293 LBS | BODY MASS INDEX: 57.52 KG/M2 | DIASTOLIC BLOOD PRESSURE: 91 MMHG | OXYGEN SATURATION: 100 % | TEMPERATURE: 98.8 F | RESPIRATION RATE: 18 BRPM

## 2019-08-05 DIAGNOSIS — R42 ORTHOSTATIC DIZZINESS: Primary | ICD-10-CM

## 2019-08-05 DIAGNOSIS — R55 SYNCOPE AND COLLAPSE: ICD-10-CM

## 2019-08-05 LAB
ABSOLUTE EOS #: 0.15 K/UL (ref 0–0.44)
ABSOLUTE IMMATURE GRANULOCYTE: 0.06 K/UL (ref 0–0.3)
ABSOLUTE LYMPH #: 1.45 K/UL (ref 1.1–3.7)
ABSOLUTE MONO #: 0.69 K/UL (ref 0.1–1.2)
ANION GAP SERPL CALCULATED.3IONS-SCNC: 13 MMOL/L (ref 9–17)
BASOPHILS # BLD: 0 % (ref 0–2)
BASOPHILS ABSOLUTE: 0.03 K/UL (ref 0–0.2)
BNP INTERPRETATION: NORMAL
BUN BLDV-MCNC: 25 MG/DL (ref 6–20)
BUN/CREAT BLD: ABNORMAL (ref 9–20)
CALCIUM SERPL-MCNC: 9 MG/DL (ref 8.6–10.4)
CHLORIDE BLD-SCNC: 92 MMOL/L (ref 98–107)
CO2: 30 MMOL/L (ref 20–31)
CREAT SERPL-MCNC: 1.12 MG/DL (ref 0.5–0.9)
DIFFERENTIAL TYPE: ABNORMAL
EOSINOPHILS RELATIVE PERCENT: 2 % (ref 1–4)
GFR AFRICAN AMERICAN: >60 ML/MIN
GFR NON-AFRICAN AMERICAN: 53 ML/MIN
GFR SERPL CREATININE-BSD FRML MDRD: ABNORMAL ML/MIN/{1.73_M2}
GFR SERPL CREATININE-BSD FRML MDRD: ABNORMAL ML/MIN/{1.73_M2}
GLUCOSE BLD-MCNC: 203 MG/DL (ref 70–99)
HCT VFR BLD CALC: 27.5 % (ref 36.3–47.1)
HEMOGLOBIN: 8 G/DL (ref 11.9–15.1)
IMMATURE GRANULOCYTES: 1 %
LYMPHOCYTES # BLD: 14 % (ref 24–43)
MCH RBC QN AUTO: 26.9 PG (ref 25.2–33.5)
MCHC RBC AUTO-ENTMCNC: 29.1 G/DL (ref 28.4–34.8)
MCV RBC AUTO: 92.6 FL (ref 82.6–102.9)
MONOCYTES # BLD: 7 % (ref 3–12)
NRBC AUTOMATED: 0 PER 100 WBC
PDW BLD-RTO: 13.2 % (ref 11.8–14.4)
PLATELET # BLD: 287 K/UL (ref 138–453)
PLATELET ESTIMATE: ABNORMAL
PMV BLD AUTO: 9.9 FL (ref 8.1–13.5)
POTASSIUM SERPL-SCNC: 3.6 MMOL/L (ref 3.7–5.3)
PRO-BNP: 31 PG/ML
RBC # BLD: 2.97 M/UL (ref 3.95–5.11)
RBC # BLD: ABNORMAL 10*6/UL
SEG NEUTROPHILS: 76 % (ref 36–65)
SEGMENTED NEUTROPHILS ABSOLUTE COUNT: 7.67 K/UL (ref 1.5–8.1)
SODIUM BLD-SCNC: 135 MMOL/L (ref 135–144)
TROPONIN INTERP: NORMAL
TROPONIN INTERP: NORMAL
TROPONIN T: NORMAL NG/ML
TROPONIN T: NORMAL NG/ML
TROPONIN, HIGH SENSITIVITY: 7 NG/L (ref 0–14)
TROPONIN, HIGH SENSITIVITY: 9 NG/L (ref 0–14)
WBC # BLD: 10.1 K/UL (ref 3.5–11.3)
WBC # BLD: ABNORMAL 10*3/UL

## 2019-08-05 PROCEDURE — 71046 X-RAY EXAM CHEST 2 VIEWS: CPT

## 2019-08-05 PROCEDURE — 85025 COMPLETE CBC W/AUTO DIFF WBC: CPT

## 2019-08-05 PROCEDURE — 80048 BASIC METABOLIC PNL TOTAL CA: CPT

## 2019-08-05 PROCEDURE — 93005 ELECTROCARDIOGRAM TRACING: CPT | Performed by: PHYSICIAN ASSISTANT

## 2019-08-05 PROCEDURE — 83880 ASSAY OF NATRIURETIC PEPTIDE: CPT

## 2019-08-05 PROCEDURE — 99285 EMERGENCY DEPT VISIT HI MDM: CPT

## 2019-08-05 PROCEDURE — 84484 ASSAY OF TROPONIN QUANT: CPT

## 2019-08-05 ASSESSMENT — PAIN DESCRIPTION - DESCRIPTORS: DESCRIPTORS: PRESSURE

## 2019-08-05 ASSESSMENT — PAIN DESCRIPTION - PROGRESSION: CLINICAL_PROGRESSION: NOT CHANGED

## 2019-08-05 ASSESSMENT — ENCOUNTER SYMPTOMS
NAUSEA: 0
RHINORRHEA: 0
VOMITING: 0
WHEEZING: 0
COUGH: 0
EYE DISCHARGE: 0
EYE PAIN: 0
COLOR CHANGE: 0
SORE THROAT: 0
EYE ITCHING: 0
BACK PAIN: 0

## 2019-08-05 ASSESSMENT — PAIN DESCRIPTION - FREQUENCY: FREQUENCY: CONTINUOUS

## 2019-08-05 ASSESSMENT — PAIN DESCRIPTION - LOCATION: LOCATION: HEAD

## 2019-08-05 ASSESSMENT — PAIN DESCRIPTION - ORIENTATION: ORIENTATION: MID

## 2019-08-05 ASSESSMENT — PAIN SCALES - GENERAL: PAINLEVEL_OUTOF10: 4

## 2019-08-05 ASSESSMENT — HEART SCORE: ECG: 1

## 2019-08-05 ASSESSMENT — PAIN DESCRIPTION - PAIN TYPE: TYPE: ACUTE PAIN

## 2019-08-06 LAB
EKG ATRIAL RATE: 93 BPM
EKG P AXIS: 34 DEGREES
EKG P-R INTERVAL: 148 MS
EKG Q-T INTERVAL: 386 MS
EKG QRS DURATION: 86 MS
EKG QTC CALCULATION (BAZETT): 479 MS
EKG R AXIS: 11 DEGREES
EKG T AXIS: 0 DEGREES
EKG VENTRICULAR RATE: 93 BPM

## 2019-08-06 PROCEDURE — 93010 ELECTROCARDIOGRAM REPORT: CPT | Performed by: INTERNAL MEDICINE

## 2019-08-06 NOTE — ED PROVIDER NOTES
BUT HEARD HER FALL AND RESPONDED IMMEDIATELY AND REPORT FINDING HER ON THE FLOOR. THEY REPORT RAPID RETURN TO CONSCIOUSNESS. NO PREMONITORY SX-NO PALPITATIONS, CHEST PAIN, COUGH, NAUSEA, VOMITING. NO FEVER, CHILLS, HEADACHE, ABD PAIN, ABN VAG BLEEDING/DISCHARGE, DYSURIA, HEMATURIA, FREQUENCY, URGENCY. HX OF HTN, CHF, MORBID OBESITY. NORMAL PO INTAKE TODAY. PATIENT REPORTS RECENT CHANGES IN ANTIHYPERTENSIVES AND DOSES. NO NUMBNESS, WEAKNESS, TINGLING, DIST VISION/SMELL/TASTE/HEARING/SPEECH, DIZZINESS/VERTIGO, NAUSEA, VOMITING. AWAKE, ALERT. COOP, RESP, ORIENTED X4. GCS-15. PERRL, EOMI. CN'S II-XII INTACT. NECK SUPPLE, NONTENDER. SPEECH FLUENT, NORMAL COMPREHENSION. NO FOCAL MOTOR OR SENSORY DEFICITS. SCALP ATRAUMATIC/NONTENDER. LUNGS CLEAR TACOS. CARDIAC-S1S2, RRR, NO MRG. ABD SOFT, OBESE, NONTENDER. NORMAL BOWEL SOUNDS. SCALP ATRAUMATIC/NONTENDER. NECK NONTENDER. LOWER EXTR'S-OBESE, NONTENDER. ? MILD EDEMA RIGHT FOOT/ANKLE. LAB RESULTS REVIEWED. CHRONIC ANEMIA. MILD HIEN, PROB PRERENAL AZOTEMIA. MILD HYPERGLYCEMIA. IMP-SYNCOPE. PLAN-DISCHARGE, ORAL FLUIDS ENCOURAGED. PATIENT ADVISED TO SIT DOWN OR LIE DOWN IF SHE EXPERIENCES SIMILAR SX IN FUTURE. RETURN TO ED IF SX WORSEN, PROGRESS, RECUR.  F/U WITH DR. Loyda Douglas IN AM.        Milton Stauffer MD  08/06/19 0010

## 2019-08-06 NOTE — ED PROVIDER NOTES
[facility] ED  Emergency Department  Emergency Medicine Resident Sign-out     Care of Marc Ricketts was assumed from [facility] and is being seen for Dizziness (x3 days ); Fall (Pt states falling down and injuring head and shoulder, states LOC ); Chest Pain (Pt states cp x1 day that radiates to the back ); and Knee Pain (Rt knee injury from fall )  . The patient's initial evaluation and plan have been discussed with the prior provider who initially evaluated the patient. EMERGENCY DEPARTMENT COURSE / MEDICAL DECISION MAKING:       MEDICATIONS GIVEN:  No orders of the defined types were placed in this encounter. LABS / RADIOLOGY:     Labs Reviewed   CBC WITH AUTO DIFFERENTIAL - Abnormal; Notable for the following components:       Result Value    RBC 2.97 (*)     Hemoglobin 8.0 (*)     Hematocrit 27.5 (*)     Seg Neutrophils 76 (*)     Lymphocytes 14 (*)     Immature Granulocytes 1 (*)     All other components within normal limits   BASIC METABOLIC PANEL - Abnormal; Notable for the following components:    Glucose 203 (*)     BUN 25 (*)     CREATININE 1.12 (*)     Potassium 3.6 (*)     Chloride 92 (*)     GFR Non- 53 (*)     All other components within normal limits   BRAIN NATRIURETIC PEPTIDE   TROPONIN   TROPONIN       Xr Chest Standard (2 Vw)    Result Date: 8/5/2019  EXAMINATION: TWO XRAY VIEWS OF THE CHEST 8/5/2019 10:01 pm COMPARISON: None. HISTORY: ORDERING SYSTEM PROVIDED HISTORY: cough, chest pain TECHNOLOGIST PROVIDED HISTORY: cough, chest pain Reason for Exam: cough, chest pain and shortness of breath FINDINGS: Possible mild edema. Heart and mediastinum normal.  Bony thorax intact. Possible mild edema or pneumonitis. RECENT VITALS:     Temp: 98.8 °F (37.1 °C),  Pulse: 89, Resp: 18, BP: (!) 118/91, SpO2: 100 %    This patient is a 40 y.o. Female with history of pulmonary hypertension, COPD and CHF presenting with dizziness with syncope.   Patient has had chest pain as well. Patient also is complaining of a cough, got azithromycin for primary doctor. Patient able to walk, can be discharged. OUTSTANDING TASKS / RECOMMENDATIONS:    1. Await walk test     FINAL IMPRESSION:     1. Orthostatic dizziness    2. Syncope and collapse        DISPOSITION:         DISPOSITION:  []  Discharge   []  Transfer -    []  Admission -     []  Against Medical Advice   []  Eloped   [facility]   [address] [address]  [phone]    Schedule an appointment as soon as possible for a visit        DISCHARGE MEDICATIONS: New Prescriptions    No medications on file           Odette Beth MD  Emergency Medicine Resident  [facility]        Odette Beth MD  08/05/19 7304

## 2019-08-06 NOTE — ED PROVIDER NOTES
a Z-Perez. She has taken 2 doses. PAST MEDICAL /SURGICAL / SOCIAL / FAMILY HISTORY      has a past medical history of Acute on chronic diastolic CHF (congestive heart failure) ([facility] [address] HIEN (acute kidney injury) ([facility] [address] COPD (chronic obstructive pulmonary disease) ([facility] [address] Hypertension, and Pulmonary hypertension ([facility] [address] has a past surgical history that includes hc cath power picc triple (2018); pr office/outpt visit,procedure only (N/A, 2018); Tracheotomy (2018); Gastrostomy tube placement (2018); and tracheostomy closure (2018).     Social History     Socioeconomic History    Marital status: Single     Spouse name: Not on file    Number of children: Not on file    Years of education: Not on file    Highest education level: Not on file   Occupational History    Not on file   Social Needs    Financial resource strain: Not on file    Food insecurity:     Worry: Not on file     Inability: Not on file    Transportation needs:     Medical: Not on file     Non-medical: Not on file   Tobacco Use    Smoking status: Former Smoker     Packs/day: 1.00     Years: 22.00     Pack years: 22.00     Types: Cigarettes     Start date:      Last attempt to quit: 1/15/2018     Years since quittin.5    Smokeless tobacco: Never Used   Substance and Sexual Activity    Alcohol use: No    Drug use: No    Sexual activity: Not on file   Lifestyle    Physical activity:     Days per week: Not on file     Minutes per session: Not on file    Stress: Not on file   Relationships    Social connections:     Talks on phone: Not on file     Gets together: Not on file     Attends [employer] service: Not on file     Active member of club or organization: Not on file     Attends meetings of clubs or organizations: Not on file     Relationship status: Not on file    Intimate partner violence:     Fear of current or ex partner: Not on file     Emotionally abused: Not on file     Physically abused: Not on file well-nourished. HENT:   Head: Normocephalic and atraumatic. Right Ear: External ear normal.   Left Ear: External ear normal.   Eyes: Right eye exhibits no discharge. Left eye exhibits no discharge. No scleral icterus. Neck: Normal range of motion. No tracheal deviation present. Cardiovascular: Normal rate, regular rhythm and normal heart sounds. Exam reveals no gallop. No murmur heard. Pulmonary/Chest: Effort normal and breath sounds normal. No stridor. No respiratory distress. Abdominal: There is no tenderness. There is no rebound and no guarding. Musculoskeletal: Normal range of motion. She exhibits no edema. Right shoulder: Normal.        Right elbow: Normal.       Right wrist: Normal.        Right knee: Normal.        Left knee: Normal.        Cervical back: Normal.        Right forearm: She exhibits tenderness. She exhibits no bony tenderness, no swelling, no edema, no deformity and no laceration. No Significant swelling to the lower legs   Neurological: She is alert and oriented to person, place, and time. Coordination normal.   Skin: Skin is warm and dry. No rash (on exposed surfaces) noted. She is not diaphoretic. No pallor. Psychiatric: She has a normal mood and affect. Her behavior is normal.       DIFFERENTIAL  DIAGNOSIS       Syncope, orthostasis, DVT, PE, ACS    PLAN (LABS / IMAGING / EKG):  Orders Placed This Encounter   Procedures    XR CHEST STANDARD (2 VW)    CBC Auto Differential    Basic Metabolic Panel    Brain Natriuretic Peptide    Troponin    Troponin    EKG 12 Lead    Insert peripheral IV       MEDICATIONS ORDERED:  No orders of the defined types were placed in this encounter. Controlled Substances Monitoring:      DIAGNOSTIC RESULTS / EMERGENCY DEPARTMENT COURSE / [facility]     ED Course as of Aug 05 2321   Mon Aug 05, 2019   8998 11:19 PM  Patient was able to ambulate with the walker with her oxygen in place.   Patient did have hemoglobin at 8 which appears

## 2019-09-21 RX ORDER — TRAZODONE HYDROCHLORIDE 50 MG/1
TABLET ORAL
Qty: 30 TABLET | Refills: 0 | Status: ON HOLD | OUTPATIENT
Start: 2019-09-21 | End: 2022-01-01 | Stop reason: HOSPADM

## 2019-10-25 ENCOUNTER — OFFICE VISIT (OUTPATIENT)
Dept: PULMONOLOGY | Age: 44
End: 2019-10-25
Payer: MEDICARE

## 2019-10-25 VITALS
SYSTOLIC BLOOD PRESSURE: 135 MMHG | BODY MASS INDEX: 57.52 KG/M2 | DIASTOLIC BLOOD PRESSURE: 65 MMHG | HEART RATE: 78 BPM | RESPIRATION RATE: 18 BRPM | HEIGHT: 60 IN | WEIGHT: 293 LBS

## 2019-10-25 DIAGNOSIS — J44.9 CHRONIC OBSTRUCTIVE PULMONARY DISEASE, UNSPECIFIED COPD TYPE (HCC): ICD-10-CM

## 2019-10-25 DIAGNOSIS — J96.12 CHRONIC RESPIRATORY FAILURE WITH HYPOXIA AND HYPERCAPNIA (HCC): Primary | ICD-10-CM

## 2019-10-25 DIAGNOSIS — G47.33 OSA TREATED WITH BIPAP: ICD-10-CM

## 2019-10-25 DIAGNOSIS — E66.01 MORBID OBESITY WITH BMI OF 70 AND OVER, ADULT (HCC): ICD-10-CM

## 2019-10-25 DIAGNOSIS — J96.11 CHRONIC RESPIRATORY FAILURE WITH HYPOXIA AND HYPERCAPNIA (HCC): Primary | ICD-10-CM

## 2019-10-25 PROCEDURE — 3023F SPIROM DOC REV: CPT | Performed by: NURSE PRACTITIONER

## 2019-10-25 PROCEDURE — 1036F TOBACCO NON-USER: CPT | Performed by: NURSE PRACTITIONER

## 2019-10-25 PROCEDURE — 99213 OFFICE O/P EST LOW 20 MIN: CPT | Performed by: NURSE PRACTITIONER

## 2019-10-25 PROCEDURE — G8427 DOCREV CUR MEDS BY ELIG CLIN: HCPCS | Performed by: NURSE PRACTITIONER

## 2019-10-25 PROCEDURE — G8417 CALC BMI ABV UP PARAM F/U: HCPCS | Performed by: NURSE PRACTITIONER

## 2019-10-25 PROCEDURE — G8598 ASA/ANTIPLAT THER USED: HCPCS | Performed by: NURSE PRACTITIONER

## 2019-10-25 PROCEDURE — G8484 FLU IMMUNIZE NO ADMIN: HCPCS | Performed by: NURSE PRACTITIONER

## 2019-10-25 PROCEDURE — G8926 SPIRO NO PERF OR DOC: HCPCS | Performed by: NURSE PRACTITIONER

## 2019-10-28 RX ORDER — SERTRALINE HYDROCHLORIDE 100 MG/1
TABLET, FILM COATED ORAL
Status: ON HOLD | COMMUNITY
End: 2022-01-01 | Stop reason: HOSPADM

## 2019-10-28 RX ORDER — CYCLOBENZAPRINE HCL 5 MG
TABLET ORAL
Refills: 0 | Status: ON HOLD | COMMUNITY
Start: 2019-10-15 | End: 2022-01-01 | Stop reason: HOSPADM

## 2019-10-28 RX ORDER — GABAPENTIN 400 MG/1
CAPSULE ORAL
Refills: 5 | Status: ON HOLD | COMMUNITY
Start: 2019-10-23 | End: 2022-01-01 | Stop reason: HOSPADM

## 2019-10-28 RX ORDER — AMLODIPINE BESYLATE 10 MG/1
TABLET ORAL
Refills: 0 | Status: ON HOLD | COMMUNITY
Start: 2019-10-15 | End: 2022-01-01 | Stop reason: HOSPADM

## 2019-10-28 RX ORDER — NYSTATIN 100000 U/G
CREAM TOPICAL
Refills: 5 | COMMUNITY
Start: 2019-10-17

## 2019-10-28 RX ORDER — GLIPIZIDE 2.5 MG/1
2.5 TABLET, EXTENDED RELEASE ORAL
COMMUNITY

## 2019-10-28 RX ORDER — GUAIFENESIN 600 MG/1
TABLET ORAL
Refills: 0 | COMMUNITY
Start: 2019-10-15

## 2020-01-28 ENCOUNTER — HOSPITAL ENCOUNTER (OUTPATIENT)
Dept: OTHER | Age: 45
Discharge: HOME | End: 2020-01-28
Payer: MEDICARE

## 2020-01-28 ENCOUNTER — HOSPITAL ENCOUNTER (OUTPATIENT)
Age: 45
Discharge: HOME | End: 2020-01-28
Payer: MEDICARE

## 2020-01-28 VITALS
DIASTOLIC BLOOD PRESSURE: 66 MMHG | HEART RATE: 61 BPM | BODY MASS INDEX: 79.34 KG/M2 | RESPIRATION RATE: 18 BRPM | SYSTOLIC BLOOD PRESSURE: 151 MMHG | WEIGHT: 293 LBS | OXYGEN SATURATION: 100 %

## 2020-01-28 PROBLEM — N17.9 AKI (ACUTE KIDNEY INJURY) (HCC): Status: RESOLVED | Noted: 2018-11-20 | Resolved: 2020-01-28

## 2020-01-28 PROBLEM — N17.9 AKI (ACUTE KIDNEY INJURY) (HCC): Status: RESOLVED | Noted: 2019-05-06 | Resolved: 2020-01-28

## 2020-01-28 LAB
-: NORMAL
REASON FOR REJECTION: NORMAL
ZZ NTE CLEAN UP: ORDERED TEST: NORMAL
ZZ NTE WITH NAME CLEAN UP: SPECIMEN SOURCE: NORMAL

## 2020-01-28 PROCEDURE — 99214 OFFICE O/P EST MOD 30 MIN: CPT

## 2020-01-28 PROCEDURE — 99214 OFFICE O/P EST MOD 30 MIN: CPT | Performed by: NURSE PRACTITIONER

## 2020-01-28 RX ORDER — METOLAZONE 2.5 MG/1
2.5 TABLET ORAL EVERY OTHER DAY
COMMUNITY

## 2020-01-28 ASSESSMENT — ENCOUNTER SYMPTOMS
SHORTNESS OF BREATH: 1
ABDOMINAL DISTENTION: 1
ABDOMINAL PAIN: 0
COUGH: 0
WHEEZING: 0
EYE DISCHARGE: 0
RHINORRHEA: 0
BLOOD IN STOOL: 0
SINUS PRESSURE: 0

## 2020-01-28 ASSESSMENT — PAIN DESCRIPTION - DESCRIPTORS: DESCRIPTORS: CONSTANT;THROBBING

## 2020-01-28 ASSESSMENT — PAIN - FUNCTIONAL ASSESSMENT: PAIN_FUNCTIONAL_ASSESSMENT: PREVENTS OR INTERFERES WITH MANY ACTIVE NOT PASSIVE ACTIVITIES

## 2020-01-28 ASSESSMENT — PAIN SCALES - GENERAL: PAINLEVEL_OUTOF10: 5

## 2020-01-28 ASSESSMENT — PAIN DESCRIPTION - ONSET: ONSET: ON-GOING

## 2020-01-28 ASSESSMENT — PAIN DESCRIPTION - LOCATION: LOCATION: ARM

## 2020-01-28 ASSESSMENT — PAIN DESCRIPTION - FREQUENCY: FREQUENCY: CONTINUOUS

## 2020-01-28 ASSESSMENT — PAIN DESCRIPTION - PROGRESSION: CLINICAL_PROGRESSION: GRADUALLY WORSENING

## 2020-01-28 ASSESSMENT — PAIN DESCRIPTION - PAIN TYPE: TYPE: CHRONIC PAIN

## 2020-01-28 ASSESSMENT — PAIN DESCRIPTION - ORIENTATION: ORIENTATION: RIGHT

## 2020-01-28 NOTE — PROGRESS NOTES
Betzy Torres MD at [address]  2018    TRACHEOTOMY  2018       Family History   Problem Relation Age of Onset    Emphysema Mother    Ben Rodriguez Alzheimer's Disease Mother     Other Mother         Blood disorder    High Blood Pressure Father     Arthritis Father     Diabetes Sister     Heart Failure Sister     Kidney Disease Sister     High Blood Pressure Brother     Dementia Maternal Grandmother     High Blood Pressure Maternal Grandmother     Dementia Maternal Grandfather     High Blood Pressure Maternal Grandfather     Cancer Paternal Grandmother     Heart Disease Paternal Grandfather     Diabetes Sister     Heart Failure Sister     Other Sister         Intestinal problems    No Known Problems Sister     No Known Problems Son        Social History     Tobacco Use    Smoking status: Former Smoker     Packs/day: 1.00     Years: 22.00     Pack years: 22.00     Types: Cigarettes     Start date:      Last attempt to quit: 1/15/2018     Years since quittin.0    Smokeless tobacco: Never Used   Substance Use Topics    Alcohol use: No      Current Outpatient Medications   Medication Sig Dispense Refill    metOLazone (ZAROXOLYN) 2.5 MG tablet Take 2.5 mg by mouth every other day      cyclobenzaprine (FLEXERIL) 5 MG tablet   0    gabapentin (NEURONTIN) 400 MG capsule   5    SM MUCUS RELIEF 600 MG extended release tablet   0    sertraline (ZOLOFT) 100 MG tablet sertraline 100 mg tablet      amLODIPine (NORVASC) 10 MG tablet   0    potassium chloride (KLOR-CON M) 10 MEQ extended release tablet Take 1 tablet by mouth daily 180 tablet 2    bumetanide (BUMEX) 1 MG tablet Take 2 mg by mouth 2 times daily      JANUVIA 50 MG tablet Take 50 mg by mouth daily  5    LORazepam (ATIVAN) 2 MG tablet Take 2 mg by mouth 2 times daily as needed.    0    melatonin 5 MG TABS tablet   5    pantoprazole sodium (PROTONIX) 40 MG PACK packet Take 40 mg by mouth every morning (before  atorvastatin (LIPITOR) 40 MG tablet Take 40 mg by mouth nightly      sertraline (ZOLOFT) 100 MG tablet Take 100 mg by mouth daily       No current facility-administered medications for this encounter. Allergies   Allergen Reactions    Bee Venom Anaphylaxis     Last bee sting when patient was at 11years of age   Sherren Kehr Sulfa Antibiotics Anaphylaxis    Asa [Aspirin]     Aspirin Other (See Comments)     HEART PALPATATIONS      Beta Adrenergic Blockers Other (See Comments)     Asystole and Bradycardia on admission  01/26/2018-2/22/2018 at [facility]    Beta Adrenergic Blockers Other (See Comments)     UNKNOWN      Sulfa Antibiotics          Subjective:      Review of Systems   Constitutional: Positive for fever. Negative for activity change, chills and fatigue. HENT: Negative for congestion, postnasal drip, rhinorrhea and sinus pressure. Eyes: Negative for discharge and visual disturbance. Respiratory: Positive for shortness of breath. Negative for cough and wheezing. Cardiovascular: Negative for chest pain and leg swelling. Gastrointestinal: Positive for abdominal distention (in am only). Negative for abdominal pain and blood in stool. Endocrine: Negative for cold intolerance and heat intolerance. Genitourinary: Negative for dysuria, flank pain and hematuria. Musculoskeletal: Positive for arthralgias. Negative for joint swelling and myalgias. In [facility], walks to commode only   Skin: Negative for pallor and rash. Neurological: Negative for headaches. Psychiatric/Behavioral: Negative for hallucinations and suicidal ideas. Objective:     Physical Exam  Vitals signs and nursing note reviewed. Constitutional:       Appearance: She is well-developed. She is obese. Comments:      HENT:      Head: Normocephalic and atraumatic. Eyes:      General: No scleral icterus. Conjunctiva/sclera: Conjunctivae normal.   Neck:      Musculoskeletal: Neck supple.    Cardiovascular:      Rate instructed to weigh self at the same time of each day, using same clothes and same scale; reinforced teaching to monitor for 3-5 lb weight increase over 1-2 days, and to notify the CHF clinic at [phone] or physician office if weight change noted. Patient verbalized understanding. Risks of smoking discussed with the patient if applicable; patient strongly discouraged to smoke. Patient verbalized understanding. Signs and symptoms of CHF discussed with patient, such as feeling more tired than normal, feeling short of breath, coughing that increases when you lie down, sudden weight gain, swelling of your feet, legs or belly. Patient verbalized understanding to notify the CHF clinic at [phone] or physician office if these symptoms occur. Compliance with plan of care and further disease process causes discussed with patient, patient encouraged to keep all follow up appointments. Patient verbalized understanding.       Electronically signedby TOLU Agudelo CNP on 1/28/2020 at 3:00 PM

## 2020-03-25 PROBLEM — E66.2 OBESITY HYPOVENTILATION SYNDROME (HCC): Status: RESOLVED | Noted: 2018-01-27 | Resolved: 2020-03-24

## 2020-03-25 PROBLEM — I50.9 ACUTE ON CHRONIC CONGESTIVE HEART FAILURE (HCC): Status: RESOLVED | Noted: 2020-03-25 | Resolved: 2020-03-24

## 2020-04-23 ENCOUNTER — HOSPITAL ENCOUNTER (OUTPATIENT)
Age: 45
Setting detail: SPECIMEN
Discharge: HOME | End: 2020-04-23
Payer: MEDICARE

## 2020-04-23 LAB
ABSOLUTE EOS #: 0.31 K/UL (ref 0–0.44)
ABSOLUTE IMMATURE GRANULOCYTE: 0.06 K/UL (ref 0–0.3)
ABSOLUTE LYMPH #: 2.17 K/UL (ref 1.1–3.7)
ABSOLUTE MONO #: 0.75 K/UL (ref 0.1–1.2)
ABSOLUTE RETIC #: 0.1 M/UL (ref 0.03–0.08)
ANION GAP SERPL CALCULATED.3IONS-SCNC: 12 MMOL/L (ref 9–17)
BASOPHILS # BLD: 0 % (ref 0–2)
BASOPHILS ABSOLUTE: 0.03 K/UL (ref 0–0.2)
BUN BLDV-MCNC: 14 MG/DL (ref 6–20)
BUN/CREAT BLD: ABNORMAL (ref 9–20)
CALCIUM SERPL-MCNC: 9.4 MG/DL (ref 8.6–10.4)
CHLORIDE BLD-SCNC: 94 MMOL/L (ref 98–107)
CO2: 33 MMOL/L (ref 20–31)
CREAT SERPL-MCNC: 1.05 MG/DL (ref 0.5–0.9)
DIFFERENTIAL TYPE: ABNORMAL
EOSINOPHILS RELATIVE PERCENT: 3 % (ref 1–4)
FERRITIN: 275 UG/L (ref 13–150)
FOLATE: 6.4 NG/ML
GFR AFRICAN AMERICAN: >60 ML/MIN
GFR NON-AFRICAN AMERICAN: 57 ML/MIN
GFR SERPL CREATININE-BSD FRML MDRD: ABNORMAL ML/MIN/{1.73_M2}
GFR SERPL CREATININE-BSD FRML MDRD: ABNORMAL ML/MIN/{1.73_M2}
GLUCOSE BLD-MCNC: 106 MG/DL (ref 70–99)
HCT VFR BLD CALC: 31 % (ref 36.3–47.1)
HEMOGLOBIN: 8.8 G/DL (ref 11.9–15.1)
IMMATURE GRANULOCYTES: 1 %
IMMATURE RETIC FRACT: 19 % (ref 2.7–18.3)
IRON SATURATION: 14 % (ref 20–55)
IRON: 34 UG/DL (ref 37–145)
LYMPHOCYTES # BLD: 18 % (ref 24–43)
MCH RBC QN AUTO: 27.2 PG (ref 25.2–33.5)
MCHC RBC AUTO-ENTMCNC: 28.4 G/DL (ref 28.4–34.8)
MCV RBC AUTO: 95.7 FL (ref 82.6–102.9)
MONOCYTES # BLD: 6 % (ref 3–12)
NRBC AUTOMATED: 0 PER 100 WBC
PDW BLD-RTO: 13.2 % (ref 11.8–14.4)
PLATELET # BLD: 296 K/UL (ref 138–453)
PLATELET ESTIMATE: ABNORMAL
PMV BLD AUTO: 10.8 FL (ref 8.1–13.5)
POTASSIUM SERPL-SCNC: 3.9 MMOL/L (ref 3.7–5.3)
RBC # BLD: 3.24 M/UL (ref 3.95–5.11)
RBC # BLD: ABNORMAL 10*6/UL
RETIC %: 3 % (ref 0.5–1.9)
RETIC HEMOGLOBIN: 29.6 PG (ref 28.2–35.7)
SEG NEUTROPHILS: 72 % (ref 36–65)
SEGMENTED NEUTROPHILS ABSOLUTE COUNT: 8.75 K/UL (ref 1.5–8.1)
SODIUM BLD-SCNC: 139 MMOL/L (ref 135–144)
TOTAL IRON BINDING CAPACITY: 235 UG/DL (ref 250–450)
UNSATURATED IRON BINDING CAPACITY: 201 UG/DL (ref 112–347)
VITAMIN B-12: 572 PG/ML (ref 232–1245)
WBC # BLD: 12.1 K/UL (ref 3.5–11.3)
WBC # BLD: ABNORMAL 10*3/UL

## 2020-07-24 ENCOUNTER — HOSPITAL ENCOUNTER (OUTPATIENT)
Age: 45
Discharge: HOME | End: 2020-07-24
Payer: MEDICARE

## 2020-07-24 ENCOUNTER — HOSPITAL ENCOUNTER (OUTPATIENT)
Dept: OTHER | Age: 45
Discharge: HOME | End: 2020-07-24
Payer: MEDICARE

## 2020-07-24 VITALS
OXYGEN SATURATION: 100 % | SYSTOLIC BLOOD PRESSURE: 124 MMHG | DIASTOLIC BLOOD PRESSURE: 72 MMHG | TEMPERATURE: 97.2 F | RESPIRATION RATE: 20 BRPM | HEART RATE: 78 BPM | BODY MASS INDEX: 75.74 KG/M2 | WEIGHT: 293 LBS

## 2020-07-24 LAB
ANION GAP SERPL CALCULATED.3IONS-SCNC: 11 MMOL/L (ref 9–17)
BUN BLDV-MCNC: 24 MG/DL (ref 6–20)
BUN/CREAT BLD: ABNORMAL (ref 9–20)
CALCIUM SERPL-MCNC: 9.4 MG/DL (ref 8.6–10.4)
CHLORIDE BLD-SCNC: 89 MMOL/L (ref 98–107)
CO2: 36 MMOL/L (ref 20–31)
CREAT SERPL-MCNC: 1.35 MG/DL (ref 0.5–0.9)
GFR AFRICAN AMERICAN: 51 ML/MIN
GFR NON-AFRICAN AMERICAN: 42 ML/MIN
GFR SERPL CREATININE-BSD FRML MDRD: ABNORMAL ML/MIN/{1.73_M2}
GFR SERPL CREATININE-BSD FRML MDRD: ABNORMAL ML/MIN/{1.73_M2}
GLUCOSE BLD-MCNC: 114 MG/DL (ref 70–99)
POTASSIUM SERPL-SCNC: 3.6 MMOL/L (ref 3.7–5.3)
SODIUM BLD-SCNC: 136 MMOL/L (ref 135–144)

## 2020-07-24 PROCEDURE — 99212 OFFICE O/P EST SF 10 MIN: CPT

## 2020-07-24 PROCEDURE — 80048 BASIC METABOLIC PNL TOTAL CA: CPT

## 2020-07-24 RX ORDER — MAGNESIUM OXIDE 400 MG/1
400 TABLET ORAL 2 TIMES DAILY
Status: ON HOLD | COMMUNITY
End: 2022-01-01 | Stop reason: HOSPADM

## 2020-07-24 RX ORDER — LANOLIN ALCOHOL/MO/W.PET/CERES
1000 CREAM (GRAM) TOPICAL DAILY
COMMUNITY

## 2020-07-24 RX ORDER — FOLIC ACID 1 MG/1
1 TABLET ORAL DAILY
COMMUNITY

## 2020-07-24 RX ORDER — DULAGLUTIDE 0.75 MG/.5ML
0.75 INJECTION, SOLUTION SUBCUTANEOUS WEEKLY
COMMUNITY

## 2020-07-24 ASSESSMENT — PAIN SCALES - GENERAL: PAINLEVEL_OUTOF10: 0

## 2020-07-24 NOTE — PROGRESS NOTES
Congestive Heart Failure Education completed and charted. CHF booklet given. Patient was receptive to education. Discussed the  importance of medication compliance. Discussed the importance of a heart healthy diet. Discussed 2000 mg sodium-restricted daily diet. Patient instructed to limit fluid intake to  1.5 to 2 liters per day. Patient instructed to weigh self at the same time of each day each morning, reinforced teaching to monitor for 3-5 lb weight increase over 1-2 days notify physician if change noted. Signs and symptoms of CHF discussed with patient, such as feeling more tired than normal, feeling short of breath, coughing that increases when lying down, sudden weight gain, swelling of the feet, legs or belly. Patient verbalized understanding to notify physician office if these symptoms occur.

## 2020-07-24 NOTE — PROGRESS NOTES
Date:  2020  Time:  2:18 PM    CHF Clinic at [facility]    Office: [phone] Fax: [phone]    Re:  Leonardo Festus   Patient : 1975    Vital Signs: /72   Pulse 78   Temp 97.2 °F (36.2 °C)   Resp 20   Wt (!) 387 lb 12.8 oz (175.9 kg)   SpO2 100%   BMI 75.74 kg/m²                       O2 Device: Nasal cannula                      O2 Flow Rate (L/min): 4 L/min    No results for input(s): CBC, HGB, HCT, WBC, PLATELET, NA, K, CL, CO2, BUN, CREATININE, GLUCOSE, BNP, INR in the last 72 hours. Respiratory:    Assessment  Charting Type: Reassessment    Breath Sounds  Right Upper Lobe: Clear  Right Middle Lobe: Clear  Right Lower Lobe: Clear, Diminished  Left Upper Lobe: Clear  Left Lower Lobe: Clear, Diminished    Cough/Sputum  Cough: Productive  Frequency: Occasional  Sputum Amount: Small  Sputum Color: Yellow         Peripheral Vascular  RLE Edema: Non-pitting  LLE Edema: Non-pitting      Complaints: No new complaints at this time    Physician Orders None    Comment : Patient seen in the Heart Failure Clinic accompanied by her mother. Patient in wheelchair with oxygen at 4L per nasal cannula in place, wears oxygen continuously. Has not been seen in the clinic in over a year. Ongoing dyspnea with exertion which is typical for her. Says her breathing has improved over the last few days with the extra diuretic she was taking. Also states improvement to lower leg, ankle and pedal edema. Weight is down 15 lbs from last year. Medication list reviewed and updated. Discussed low sodium diet and fluid restrictions. Labs drawn today and patient has follow up with cardiology on 20.  Next appointment in [address] [place] [address] on 20 at 100 pm.    Electronically signed by Marina Love RN on 2020 at 2:18 PM

## 2020-09-08 NOTE — PROGRESS NOTES
[facility] Verbally reviewed medication list with patient; patient verbalized understanding. Discussed 2000mg/day sodium restricted diet; patient verbalized understanding. Moderate daily exercise encouraged as tolerated. Discussed rest breaks as needed; patient verbalized understanding. Patient instructed to weigh self at the same time of each day, using same clothes and same scale; reinforced teaching to monitor for 3-5 lb weight increase over 1-2 days, and to notify the CHF clinic at [phone] or physician office if weight change noted. Patient verbalized understanding. Risks of smoking discussed with the patient if applicable; patient strongly discouraged to smoke. Patient verbalized understanding. Signs and symptoms of CHF discussed with patient, such as feeling more tired than normal, feeling short of breath, coughing that increases when you lie down, sudden weight gain, swelling of your feet, legs or belly. Patient verbalized understanding to notify the CHF clinic at [phone] or physician office if these symptoms occur. Compliance with plan of care and further disease process causes discussed with patient, patient encouraged to keep all follow up appointments. Patient verbalized understanding.

## 2020-09-30 ENCOUNTER — HOSPITAL ENCOUNTER (OUTPATIENT)
Dept: OTHER | Age: 45
Discharge: HOME | End: 2020-09-30
Payer: MEDICARE

## 2020-09-30 VITALS
HEART RATE: 71 BPM | OXYGEN SATURATION: 100 % | BODY MASS INDEX: 74.72 KG/M2 | SYSTOLIC BLOOD PRESSURE: 120 MMHG | DIASTOLIC BLOOD PRESSURE: 56 MMHG | RESPIRATION RATE: 20 BRPM | WEIGHT: 293 LBS | TEMPERATURE: 96.9 F

## 2020-09-30 PROCEDURE — 99212 OFFICE O/P EST SF 10 MIN: CPT

## 2020-09-30 RX ORDER — DIAZEPAM 5 MG/1
5 TABLET ORAL EVERY 12 HOURS PRN
COMMUNITY

## 2020-09-30 NOTE — PROGRESS NOTES
Verbally reviewed medication list with patient; patient verbalized understanding. Discussed 2000mg/day sodium restricted diet; patient verbalized understanding. Moderate daily exercise encouraged as tolerated. Discussed rest breaks as needed; patient verbalized understanding. Patient instructed to weigh self at the same time of each day, using same clothes and same scale; reinforced teaching to monitor for 3-5 lb weight increase over 1-2 days, and to notify the CHF clinic at [phone] or physician office if weight change noted. Patient verbalized understanding. Risks of smoking discussed with the patient if applicable; patient strongly discouraged to smoke. Patient verbalized understanding. Signs and symptoms of CHF discussed with patient, such as feeling more tired than normal, feeling short of breath, coughing that increases when you lie down, sudden weight gain, swelling of your feet, legs or belly. Patient verbalized understanding to notify the CHF clinic at [phone] or physician office if these symptoms occur. Compliance with plan of care and further disease process causes discussed with patient, patient encouraged to keep all follow up appointments. Patient verbalized understanding.

## 2020-10-07 ENCOUNTER — HOSPITAL ENCOUNTER (OUTPATIENT)
Age: 45
Setting detail: SPECIMEN
Discharge: HOME | End: 2020-10-07
Payer: MEDICARE

## 2020-10-07 LAB
ANION GAP SERPL CALCULATED.3IONS-SCNC: 11 MMOL/L (ref 9–17)
BNP INTERPRETATION: NORMAL
BUN BLDV-MCNC: 20 MG/DL (ref 6–20)
BUN/CREAT BLD: ABNORMAL (ref 9–20)
CALCIUM SERPL-MCNC: 8.7 MG/DL (ref 8.6–10.4)
CHLORIDE BLD-SCNC: 95 MMOL/L (ref 98–107)
CHOLESTEROL, FASTING: 209 MG/DL
CHOLESTEROL/HDL RATIO: 5
CO2: 34 MMOL/L (ref 20–31)
CREAT SERPL-MCNC: 0.98 MG/DL (ref 0.5–0.9)
GFR AFRICAN AMERICAN: >60 ML/MIN
GFR NON-AFRICAN AMERICAN: >60 ML/MIN
GFR SERPL CREATININE-BSD FRML MDRD: ABNORMAL ML/MIN/{1.73_M2}
GFR SERPL CREATININE-BSD FRML MDRD: ABNORMAL ML/MIN/{1.73_M2}
GLUCOSE FASTING: 146 MG/DL (ref 70–99)
HCT VFR BLD CALC: 30 % (ref 36.3–47.1)
HDLC SERPL-MCNC: 42 MG/DL
HEMOGLOBIN: 8.6 G/DL (ref 11.9–15.1)
LDL CHOLESTEROL: 148 MG/DL (ref 0–130)
MCH RBC QN AUTO: 26.8 PG (ref 25.2–33.5)
MCHC RBC AUTO-ENTMCNC: 28.7 G/DL (ref 28.4–34.8)
MCV RBC AUTO: 93.5 FL (ref 82.6–102.9)
NRBC AUTOMATED: 0 PER 100 WBC
PDW BLD-RTO: 13.7 % (ref 11.8–14.4)
PLATELET # BLD: 258 K/UL (ref 138–453)
PMV BLD AUTO: 10.8 FL (ref 8.1–13.5)
POTASSIUM SERPL-SCNC: 3.8 MMOL/L (ref 3.7–5.3)
PRO-BNP: 51 PG/ML
RBC # BLD: 3.21 M/UL (ref 3.95–5.11)
SODIUM BLD-SCNC: 140 MMOL/L (ref 135–144)
TRIGLYCERIDE, FASTING: 93 MG/DL
VLDLC SERPL CALC-MCNC: ABNORMAL MG/DL (ref 1–30)
WBC # BLD: 9.5 K/UL (ref 3.5–11.3)

## 2020-10-08 LAB
ESTIMATED AVERAGE GLUCOSE: 140 MG/DL
HBA1C MFR BLD: 6.5 % (ref 4–6)

## 2021-01-01 ENCOUNTER — APPOINTMENT (OUTPATIENT)
Dept: GENERAL RADIOLOGY | Age: 46
End: 2021-01-01
Payer: MEDICARE

## 2021-01-01 ENCOUNTER — HOSPITAL ENCOUNTER (EMERGENCY)
Age: 46
Discharge: HOME | End: 2021-06-30
Attending: EMERGENCY MEDICINE
Payer: MEDICARE

## 2021-01-01 VITALS
HEART RATE: 83 BPM | OXYGEN SATURATION: 96 % | SYSTOLIC BLOOD PRESSURE: 148 MMHG | RESPIRATION RATE: 17 BRPM | DIASTOLIC BLOOD PRESSURE: 84 MMHG

## 2021-01-01 DIAGNOSIS — J44.1 COPD EXACERBATION (HCC): Primary | ICD-10-CM

## 2021-01-01 LAB
ABSOLUTE EOS #: 0.31 K/UL (ref 0–0.44)
ABSOLUTE IMMATURE GRANULOCYTE: 0.12 K/UL (ref 0–0.3)
ABSOLUTE LYMPH #: 1.78 K/UL (ref 1.1–3.7)
ABSOLUTE MONO #: 0.72 K/UL (ref 0.1–1.2)
ANION GAP SERPL CALCULATED.3IONS-SCNC: 10 MMOL/L (ref 9–17)
BASOPHILS # BLD: 0 % (ref 0–2)
BASOPHILS ABSOLUTE: 0.04 K/UL (ref 0–0.2)
BNP INTERPRETATION: NORMAL
BUN BLDV-MCNC: 16 MG/DL (ref 6–20)
BUN/CREAT BLD: ABNORMAL (ref 9–20)
CALCIUM SERPL-MCNC: 8.8 MG/DL (ref 8.6–10.4)
CHLORIDE BLD-SCNC: 96 MMOL/L (ref 98–107)
CO2: 32 MMOL/L (ref 20–31)
CREAT SERPL-MCNC: 0.78 MG/DL (ref 0.5–0.9)
CULTURE: NORMAL
CULTURE: NORMAL
DIFFERENTIAL TYPE: ABNORMAL
EKG ATRIAL RATE: 86 BPM
EKG P AXIS: 45 DEGREES
EKG P-R INTERVAL: 144 MS
EKG Q-T INTERVAL: 390 MS
EKG QRS DURATION: 84 MS
EKG QTC CALCULATION (BAZETT): 466 MS
EKG R AXIS: 11 DEGREES
EKG T AXIS: 26 DEGREES
EKG VENTRICULAR RATE: 86 BPM
EOSINOPHILS RELATIVE PERCENT: 2 % (ref 1–4)
GFR AFRICAN AMERICAN: >60 ML/MIN
GFR NON-AFRICAN AMERICAN: >60 ML/MIN
GFR SERPL CREATININE-BSD FRML MDRD: ABNORMAL ML/MIN/{1.73_M2}
GFR SERPL CREATININE-BSD FRML MDRD: ABNORMAL ML/MIN/{1.73_M2}
GLUCOSE BLD-MCNC: 182 MG/DL (ref 70–99)
HCT VFR BLD CALC: 29.8 % (ref 36.3–47.1)
HEMOGLOBIN: 8.6 G/DL (ref 11.9–15.1)
IMMATURE GRANULOCYTES: 1 %
INR BLD: 1
LACTIC ACID, SEPSIS WHOLE BLOOD: 1.7 MMOL/L (ref 0.5–1.9)
LACTIC ACID, SEPSIS: NORMAL MMOL/L (ref 0.5–1.9)
LYMPHOCYTES # BLD: 13 % (ref 24–43)
Lab: NORMAL
Lab: NORMAL
MCH RBC QN AUTO: 26.3 PG (ref 25.2–33.5)
MCHC RBC AUTO-ENTMCNC: 28.9 G/DL (ref 28.4–34.8)
MCV RBC AUTO: 91.1 FL (ref 82.6–102.9)
MONOCYTES # BLD: 5 % (ref 3–12)
NRBC AUTOMATED: 0 PER 100 WBC
PARTIAL THROMBOPLASTIN TIME: 20.1 SEC (ref 20.5–30.5)
PDW BLD-RTO: 14.2 % (ref 11.8–14.4)
PLATELET # BLD: 273 K/UL (ref 138–453)
PLATELET ESTIMATE: ABNORMAL
PMV BLD AUTO: 10.8 FL (ref 8.1–13.5)
POTASSIUM SERPL-SCNC: 3.6 MMOL/L (ref 3.7–5.3)
PRO-BNP: 107 PG/ML
PROTHROMBIN TIME: 10.6 SEC (ref 9.1–12.3)
RBC # BLD: 3.27 M/UL (ref 3.95–5.11)
RBC # BLD: ABNORMAL 10*6/UL
SEG NEUTROPHILS: 79 % (ref 36–65)
SEGMENTED NEUTROPHILS ABSOLUTE COUNT: 10.98 K/UL (ref 1.5–8.1)
SODIUM BLD-SCNC: 138 MMOL/L (ref 135–144)
SPECIMEN DESCRIPTION: NORMAL
SPECIMEN DESCRIPTION: NORMAL
TROPONIN INTERP: NORMAL
TROPONIN INTERP: NORMAL
TROPONIN T: NORMAL NG/ML
TROPONIN T: NORMAL NG/ML
TROPONIN, HIGH SENSITIVITY: <6 NG/L (ref 0–14)
TROPONIN, HIGH SENSITIVITY: <6 NG/L (ref 0–14)
WBC # BLD: 14 K/UL (ref 3.5–11.3)
WBC # BLD: ABNORMAL 10*3/UL

## 2021-01-01 PROCEDURE — 85730 THROMBOPLASTIN TIME PARTIAL: CPT

## 2021-01-01 PROCEDURE — 6370000000 HC RX 637 (ALT 250 FOR IP): Performed by: STUDENT IN AN ORGANIZED HEALTH CARE EDUCATION/TRAINING PROGRAM

## 2021-01-01 PROCEDURE — 84484 ASSAY OF TROPONIN QUANT: CPT

## 2021-01-01 PROCEDURE — 94640 AIRWAY INHALATION TREATMENT: CPT

## 2021-01-01 PROCEDURE — 85610 PROTHROMBIN TIME: CPT

## 2021-01-01 PROCEDURE — 83605 ASSAY OF LACTIC ACID: CPT

## 2021-01-01 PROCEDURE — 99284 EMERGENCY DEPT VISIT MOD MDM: CPT

## 2021-01-01 PROCEDURE — 87040 BLOOD CULTURE FOR BACTERIA: CPT

## 2021-01-01 PROCEDURE — 93005 ELECTROCARDIOGRAM TRACING: CPT | Performed by: STUDENT IN AN ORGANIZED HEALTH CARE EDUCATION/TRAINING PROGRAM

## 2021-01-01 PROCEDURE — 71045 X-RAY EXAM CHEST 1 VIEW: CPT

## 2021-01-01 PROCEDURE — 80048 BASIC METABOLIC PNL TOTAL CA: CPT

## 2021-01-01 PROCEDURE — 85025 COMPLETE CBC W/AUTO DIFF WBC: CPT

## 2021-01-01 PROCEDURE — 83880 ASSAY OF NATRIURETIC PEPTIDE: CPT

## 2021-01-01 RX ORDER — BUMETANIDE 1 MG/1
1 TABLET ORAL ONCE
Status: COMPLETED | OUTPATIENT
Start: 2021-01-01 | End: 2021-01-01

## 2021-01-01 RX ORDER — IPRATROPIUM BROMIDE AND ALBUTEROL SULFATE 2.5; .5 MG/3ML; MG/3ML
1 SOLUTION RESPIRATORY (INHALATION) EVERY 4 HOURS PRN
Status: DISCONTINUED | OUTPATIENT
Start: 2021-01-01 | End: 2021-01-01 | Stop reason: HOSPADM

## 2021-01-01 RX ADMIN — BUMETANIDE 1 MG: 1 TABLET ORAL at 18:15

## 2021-01-01 RX ADMIN — IPRATROPIUM BROMIDE AND ALBUTEROL SULFATE 1 AMPULE: .5; 3 SOLUTION RESPIRATORY (INHALATION) at 17:43

## 2021-01-01 ASSESSMENT — PAIN DESCRIPTION - LOCATION: LOCATION: ARM;WRIST

## 2021-01-01 ASSESSMENT — ENCOUNTER SYMPTOMS
RHINORRHEA: 0
ABDOMINAL DISTENTION: 0
CONSTIPATION: 0
COUGH: 0
SINUS PAIN: 0
ABDOMINAL PAIN: 0
NAUSEA: 0
BACK PAIN: 1
CHOKING: 0
SHORTNESS OF BREATH: 1
TACHYPNEA: 1
WHEEZING: 0
DIARRHEA: 0
VOICE CHANGE: 0
CHEST TIGHTNESS: 1
VOMITING: 0
EYES NEGATIVE: 1
SINUS PRESSURE: 0

## 2021-01-01 ASSESSMENT — PAIN DESCRIPTION - ONSET: ONSET: ON-GOING

## 2021-01-01 ASSESSMENT — PAIN DESCRIPTION - PROGRESSION: CLINICAL_PROGRESSION: NOT CHANGED

## 2021-01-01 ASSESSMENT — PAIN DESCRIPTION - ORIENTATION: ORIENTATION: RIGHT

## 2021-01-01 ASSESSMENT — PAIN DESCRIPTION - PAIN TYPE: TYPE: CHRONIC PAIN

## 2021-01-01 ASSESSMENT — PAIN DESCRIPTION - FREQUENCY: FREQUENCY: INTERMITTENT

## 2021-01-01 ASSESSMENT — PAIN SCALES - GENERAL: PAINLEVEL_OUTOF10: 7

## 2021-06-29 NOTE — ED NOTES
The following labs labeled with pt sticker and tubed:     [x] Lavender   [] on ice   [] Blue   [x] Green/yellow  [x] Green/black [] on ice  [] Pink  [] Red  [] Yellow       [] COVID-19 swab    [] Rapid     [] Urine Sample  [] Pelvic Cultures    [] Blood Cultures            Lien Marrero RN  06/29/21 3174

## 2021-06-29 NOTE — ED NOTES
Patient to room 21, on EMS stretcher   Patient is a 39year old female  Patient complains of increased dyspnea since last night, worsening today about an hour and half ago  Patient has hx of COPD, wears 3L at home  O2 sat was 88% when EMS arrived, increased to 99% after they increased O2  Remains on 5L O2 (ok'd per Dr. Hermann Gaviria) when O2sat decreased to 88% on 3L  Patient has hx of COPD, DM, CHF  Patient not in distress at this time  Patient verbalizes home nurse noted decrease in O2 sats when she was there for home visit  Dr. Hermann Gaviria was in to evaluate patient  Will continue to assess       Monia Apley, RN  06/29/21 0467

## 2021-06-29 NOTE — CARE COORDINATION
Met with patient to discuss out of network status, pt notified she will be covered for emergency services but in the event that she is admitted her services may require additional out of pocket pay, the patient relates she will stay if necessary and contact her insurance company in the morning as she has a [employer] as a secondary, Dr Felipa Johns updated

## 2021-06-29 NOTE — ED NOTES
Bed: 21  Expected date:   Expected time:   Means of arrival:   Comments:  [facility] [address] [facility], RN  06/29/21 5577

## 2021-06-29 NOTE — ED NOTES
The following labs labeled with pt sticker and tubed:     [] Lavender   [] on ice   [] Blue   [x] Green/yellow  [] Green/black [] on ice  [] Pink  [] Red  [] Yellow       [] COVID-19 swab    [] Rapid     [] Urine Sample  [] Pelvic Cultures    [] Blood Cultures            Con Oleary RN  06/29/21 4134

## 2021-06-29 NOTE — ED PROVIDER NOTES
[facility]  Emergency Department  Faculty Attestation     I performed a history and physical examination of the patient and discussed management with the resident. I reviewed the residents note and agree with the documented findings and plan of care. Any areas of disagreement are noted on the chart. I was personally present for the key portions of any procedures. I have documented in the chart those procedures where I was not present during the key portions. I have reviewed the emergency nurses triage note. I agree with the chief complaint, past medical history, past surgical history, allergies, medications, social and family history as documented unless otherwise noted below. For Physician Assistant/ Nurse Practitioner cases/documentation I have personally evaluated this patient and have completed at least one if not all key elements of the E/M (history, physical exam, and MDM). Additional findings are as noted. Primary Care Physician:  Letitia Billy,     Screenings:  [unfilled]    CHIEF COMPLAINT       Chief Complaint   Patient presents with    Shortness of Breath     increased dyspnea started last night, worse an hour and half ago       RECENT VITALS:    ,   ,  , BP: (!) 148/69    LABS:  Labs Reviewed   CBC WITH AUTO DIFFERENTIAL   BASIC METABOLIC PANEL W/ REFLEX TO MG FOR LOW K   TROPONIN   BRAIN NATRIURETIC PEPTIDE   TROPONIN       Radiology  XR CHEST PORTABLE    (Results Pending)           EKG:   EKG Interpretation    Interpreted by me    Rhythm: normal sinus   Rate: normal  Axis: normal  Ectopy: none  Conduction: normal  ST Segments: no acute change  T Waves: no acute change  Q Waves: none    Clinical Impression: no acute changes and normal EKG    Attending Physician Additional  Notes    Patient been more short of breath than normal.  She normally takes 3 L per nasal cannula oxygen 24/7 but now is up to 4 L.   Saturations were 88% on this at home per EMS, now 92% on 5 L per nasal cannula. Minimal improvement with bronchodilators. She has a history of COPD/asthma, cardiomyopathy, CHF, pulmonary hypertension. She feels fluid overloaded. She states she is compliant with her diuretics. No fevers. No Covid symptoms. She is vaccinated. On exam she is tachypneic, minimally hypertensive, afebrile. Breath sounds are diminished. No obvious JVD or gallop. There is bilateral leg edema but no asymmetry or calf tenderness. Concern is for CHF exacerbation, consider pneumonia or COPD as well. Plan is chest x-ray, cardiac monitor, laboratory studies, diuresis, bronchodilators, reassess, anticipate admission. Rocío Kay.  Olimpia Ríos MD, [address] [facility]  Attending Emergency  Physician               Fanta Bradshaw MD  06/29/21 [phone]

## 2021-06-30 NOTE — ED NOTES
Juliet Retort here to p/u patient  Phyllistshraddha Ramirez discontinued  Patient verbalizes feels much better  Still feels slightly dyspneic with exertion  Patient transferred to EMS stretcher, 5 person lift  Patient denies any needs at this time     Monia Apley, RN  06/29/21 2213

## 2021-06-30 NOTE — ED NOTES
arranged transportation back to residence using [address] per request of Doctor. ETA [id number]. Patient reports she lives alone, but has a home health aide that comes daily that assist her with care.       SALOME Mcclendon  06/29/21 2155       SALOME Mcclendon  06/29/21 2156

## 2021-06-30 NOTE — ED NOTES
Periwick placed on patient, Patient agreeable to this  Patient is on menses at this time     [facility], [address] [facility]  06/29/21 9169

## 2021-06-30 NOTE — ED PROVIDER NOTES
[facility]  Emergency Department  Faculty Attestation     I performed a history and physical examination of the patient and discussed management with the resident. I reviewed the residents note and agree with the documented findings and plan of care. Any areas of disagreement are noted on the chart. I was personally present for the key portions of any procedures. I have documented in the chart those procedures where I was not present during the key portions. I have reviewed the emergency nurses triage note. I agree with the chief complaint, past medical history, past surgical history, allergies, medications, social and family history as documented unless otherwise noted below. For Physician Assistant/ Nurse Practitioner cases/documentation I have personally evaluated this patient and have completed at least one if not all key elements of the E/M (history, physical exam, and MDM). Additional findings are as noted.       Primary Care Physician:  Elsa Peraza DO    Screenings:  [unfilled]    CHIEF COMPLAINT       Chief Complaint   Patient presents with    Shortness of Breath     increased dyspnea started last night, worse an hour and half ago       RECENT VITALS:    ,   , Resp: 25, BP: (!) 148/69    LABS:  Labs Reviewed   CBC WITH AUTO DIFFERENTIAL - Abnormal; Notable for the following components:       Result Value    WBC 14.0 (*)     RBC 3.27 (*)     Hemoglobin 8.6 (*)     Hematocrit 29.8 (*)     Seg Neutrophils 79 (*)     Lymphocytes 13 (*)     Immature Granulocytes 1 (*)     Segs Absolute 10.98 (*)     All other components within normal limits   BASIC METABOLIC PANEL W/ REFLEX TO MG FOR LOW K - Abnormal; Notable for the following components:    Glucose 182 (*)     Potassium 3.6 (*)     Chloride 96 (*)     CO2 32 (*)     All other components within normal limits   APTT - Abnormal; Notable for the following components:    PTT 20.1 (*)     All other components within normal limits   CULTURE, BLOOD 1   CULTURE, BLOOD 1   CULTURE, URINE   TROPONIN   BRAIN NATRIURETIC PEPTIDE   TROPONIN   LACTATE, SEPSIS   PROTIME-INR   URINALYSIS WITH MICROSCOPIC   POC BLOOD GAS AND CHEMISTRY       Radiology  XR CHEST PORTABLE   Final Result   Cardiomegaly with associated mild pulmonary vascular congestion . EKG:   EKG Interpretation    Interpreted by me    Rhythm: normal sinus   Rate: normal  Axis: normal  Ectopy: none  Conduction: normal  ST Segments: no acute change  T Waves: no acute change  Q Waves: none    Clinical Impression: no acute changes     Attending Physician Additional  Notes    Patient feels more short of breath than normal.  She normally is on 3 L per nasal cannula oxygen at all times. She is requiring 5 L per nasal cannula now. On her 3 L EMS found oxygen saturations of 88%. She has minimal improvement with albuterol in route. She has been using albuterol at home without relief. She has a history of COPD/asthma, 6 cardiomyopathy, CHF, pulmonary hypertension. She feels fluid overloaded. She states he is compliant with her diuretics. No calf swelling calf pain hemoptysis or sputum. No Covid symptoms. She is had Covid vaccine. On exam she is mildly dyspneic, tachypneic, afebrile, no tachycardia. On 5 L per nasal cannula saturations are 95 to 96%. Breath sounds are diminished with expiratory prolongation. No obvious wheezing. No obvious JVD or gallop. There is bilateral leg tenseness unable to tell with versus edema but no pitting, no cords or calf tenderness. Patient received DuoNeb and feels significantly better. Chest x-ray shows mild CHF. Laboratory studies are negative other than elevated white blood count. Impression is COPD exacerbation versus CHF. Since patient feels better I have placed her back on 3 L per nasal cannula and she has saturations of 95% and she feels back to her baseline. She feels comfortable with discharge.   We will

## 2021-06-30 NOTE — ED NOTES
Patient discharged to home with Nathaniel Finney feels better, feels at her baseline  Denies any discomfort at this time  All belongings with patient       Evelin Sawyer RN  06/30/21 Mack Mir RN  06/30/21 7411

## 2021-06-30 NOTE — ED PROVIDER NOTES
[facility]  ED  Emergency Department Encounter  Emergency Medicine Resident     Pt Name: Yissel Gotti  HKB:[id number]  [place] 1975  Date of evaluation: 21  PCP:  Kate Liu DO    CHIEF COMPLAINT       Chief Complaint   Patient presents with    Shortness of Breath     increased dyspnea started last night, worse an hour and half ago       HISTORY OF PRESENT ILLNESS  (Location/Symptom, Timing/Onset, Context/Setting, Quality, Duration, ModifyingFactors, Severity.)      Yissel Gotti is a 39 y.o. female with PMH of COPD, congestive heart failure, HIEN, diabetes mellitus, hypertension, morbid obesity and pulmonary hypertension presents emerge department for shortness of breath. Patient states that she started last night and then worsening since the day began. Patient states that over the last several weeks has been noticing that she is been increasing need for her oxygen requirements. Patient denies any chest pain but states that normally when she gets this way that she needs a Pap to be admitted because she does not improve rapidly. Patient denies: Fever, nausea, vomiting, chills, chest pain, recent sick contacts or changes in mental status. PAST MEDICAL / SURGICAL / SOCIAL /FAMILY HISTORY      has a past medical history of Acute on chronic diastolic CHF (congestive heart failure) (HCC), HIEN (acute kidney injury) ([address] HIEN (acute kidney injury) ([address] Asthma, CHF, Chronic obstructive lung disease ([address] Chronic respiratory failure with hypoxia ([address] COPD, Diabetes mellitus, new onset ([address] Former smoker, HTN, Hyperglycemia, Hyperlipidemia, Hypertension, Morbid obesity with BMI of 60.0-69.9, adult ([address] Neuromuscular disorder ([address] STEPH on CPAP, Pedal edema, Pneumonia, Pulmonary hypertension, moderate to severe ([address] Torn meniscus, and Wears glasses. No other pertinent PMH on review with patient/guardian.      has a past surgical history that includes  section (1997); Abdomen surgery; joint replacement; Cystoscopy (June 5, 2019); Cystoscopy (N/A, 6/5/2019); hc cath power picc triple (2/2/2018); pr office/outpt visit,procedure only (N/A, 2/17/2018); Tracheotomy (02/2018); Gastrostomy tube placement (02/2018); and tracheostomy closure (03/2018). No other pertinent PSH on review with patient/guardian. Social History     Socioeconomic History    Marital status: Single     Spouse name: Not on file    Number of children: Not on file    Years of education: Not on file    Highest education level: Not on file   Occupational History    Not on file   Tobacco Use    Smoking status: Former Smoker     Packs/day: 1.00     Years: 22.00     Pack years: 22.00     Types: Cigarettes     Start date: 18     Quit date: 1/15/2018     Years since quitting: 3.4    Smokeless tobacco: Never Used   Vaping Use    Vaping Use: Never used   Substance and Sexual Activity    Alcohol use: No    Drug use: No    Sexual activity: Not Currently   Other Topics Concern    Not on file   Social History Narrative    ** Merged History Encounter **          Social Determinants of Health     Financial Resource Strain:     Difficulty of Paying Living Expenses:    Food Insecurity:     Worried About Running Out of Food in the Last Year:     Ran Out of Food in the Last Year:    Transportation Needs:     Lack of Transportation (Medical):      Lack of Transportation (Non-Medical):    Physical Activity:     Days of Exercise per Week:     Minutes of Exercise per Session:    Stress:     Feeling of Stress :    Social Connections:     Frequency of Communication with Friends and Family:     Frequency of Social Gatherings with Friends and Family:     Attends Baptism Services:     Active Member of Clubs or Organizations:     Attends Club or Organization Meetings:     Marital Status:    Intimate Partner Violence:     Fear of Current or Ex-Partner:     Emotionally Abused:     Physically Abused:     Sexually Abused:        I counseled the patient against using tobacco products. Family History   Problem Relation Age of Onset    Emphysema Mother    Zannie Cowden Alzheimer's Disease Mother     Other Mother         Blood disorder    High Blood Pressure Father     Arthritis Father     Diabetes Sister     Heart Failure Sister     Kidney Disease Sister     High Blood Pressure Brother     Dementia Maternal Grandmother     High Blood Pressure Maternal Grandmother     Dementia Maternal Grandfather     High Blood Pressure Maternal Grandfather     Cancer Paternal Grandmother     Heart Disease Paternal Grandfather     Diabetes Sister     Heart Failure Sister     Other Sister         Intestinal problems    No Known Problems Sister     No Known Problems Son      No other pertinent FamHx on review with patient/guardian. Allergies:  Bee venom, Sulfa antibiotics, Asa [aspirin], Aspirin, Beta adrenergic blockers, Beta adrenergic blockers, and Sulfa antibiotics    Home Medications:  Prior to Admission medications    Medication Sig Start Date End Date Taking? Authorizing Provider   diazePAM (VALIUM) 5 MG tablet Take 5 mg by mouth every 12 hours as needed for Anxiety.     Historical Provider, MD   folic acid (FOLVITE) 1 MG tablet Take 1 mg by mouth daily    Historical Provider, MD   magnesium oxide (MAG-OX) 400 MG tablet Take 400 mg by mouth 2 times daily    Historical Provider, MD   vitamin B-12 (CYANOCOBALAMIN) 1000 MCG tablet Take 1,000 mcg by mouth daily    Historical Provider, MD   Dulaglutide (TRULICITY) 7.70 QA/1.6OE SOPN Inject 0.75 mg into the skin once a week    Historical Provider, MD   metOLazone (ZAROXOLYN) 2.5 MG tablet Take 2.5 mg by mouth every other day    Historical Provider, MD   glipiZIDE (GLUCOTROL XL) 2.5 MG extended release tablet Take 2.5 mg by mouth    Historical Provider, MD   cyclobenzaprine (FLEXERIL) 5 MG tablet  10/15/19   Historical Provider, MD   gabapentin (NEURONTIN) 400 MG capsule 10/23/19   Historical Provider, MD GREEN MUCUS RELIEF 600 MG extended release tablet  10/15/19   Historical Provider, MD   nystatin (MYCOSTATIN) 178389 UNIT/GM cream  10/17/19   Historical Provider, MD   sertraline (ZOLOFT) 100 MG tablet sertraline 100 mg tablet    Historical Provider, MD   amLODIPine (NORVASC) 10 MG tablet  10/15/19   Historical Provider, MD   Ergocalciferol (VITAMIN D2 PO) Take 50,000 Units by mouth once a week    Historical Provider, MD   traZODone (DESYREL) 50 MG tablet TAKE 1 TABLET AT NIGHT 9/21/19   Agustin Mukherjee MD   potassium chloride (KLOR-CON M) 10 MEQ extended release tablet Take 1 tablet by mouth daily 7/25/19   Jana Adan MD   bumetanide (BUMEX) 1 MG tablet Take 2 mg by mouth 2 times daily    Historical Provider, MD   JANUVIA 50 MG tablet Take 50 mg by mouth daily 6/11/19   Historical Provider, MD   LORazepam (ATIVAN) 2 MG tablet Take 2 mg by mouth 2 times daily as needed. 3/29/19   Historical Provider, MD   melatonin 5 MG TABS tablet  5/7/19   Historical Provider, MD   glucose monitoring kit (FREESTYLE) monitoring kit 1 kit by Does not apply route daily 5/8/19   Chaitanya Pierson MD   blood glucose monitor strips Test three  times a day & as needed for symptoms of irregular blood glucose. 5/8/19   Chaitanya Pierson MD   Lancets MISC 1 each by Does not apply route daily 5/8/19   Chaitanya Pierson MD   pantoprazole sodium (PROTONIX) 40 MG PACK packet Take 40 mg by mouth every morning (before breakfast)    Historical Provider, MD   spironolactone (ALDACTONE) 25 MG tablet Take 1 tablet by mouth daily 11/22/18   Jeremi Dupont MD   oxyCODONE-acetaminophen (PERCOCET)  MG per tablet Take 1 tablet by mouth every 6 hours as needed for Pain. [facility]     Historical Provider, MD   isosorbide dinitrate (ISORDIL) 20 MG tablet Take 1 tablet by mouth 3 times daily 9/20/18   Cynthia Gomez MD   hydrALAZINE (APRESOLINE) 25 MG tablet Take 1 tablet by mouth every 8 hours 9/20/18   John Manifold Jimena Dotson MD   Blood Pressure KIT 1 Device by Does not apply route 2 times daily 9/20/18   Joseph Garcia MD   atorvastatin (LIPITOR) 40 MG tablet Take 40 mg by mouth nightly    Historical Provider, MD   ferrous sulfate 325 (65 Fe) MG EC tablet Take 325 g by mouth 2 times daily (with meals) 4/20/18   Historical Provider, MD   gabapentin (NEURONTIN) 600 MG tablet Take 400 mg by mouth 3 times daily. Take 2 cap three times a day    Historical Provider, MD   loratadine (CLARITIN) 10 MG tablet Take 10 mg by mouth daily    Historical Provider, MD   tiotropium (SPIRIVA) 18 MCG inhalation capsule Inhale 1 capsule into the lungs 8/31/17   Historical Provider, MD   sertraline (ZOLOFT) 100 MG tablet Take 100 mg by mouth daily    Historical Provider, MD   atorvastatin (LIPITOR) 40 MG tablet Take 1 tablet by mouth nightly 2/21/18   Heladio House MD   albuterol sulfate  (90 Base) MCG/ACT inhaler Inhale 2 puffs into the lungs every 6 hours as needed for Wheezing    Historical Provider, MD   fluticasone (FLONASE) 50 MCG/ACT nasal spray 1 spray by Nasal route daily    Historical Provider, MD   albuterol (PROVENTIL) (2.5 MG/3ML) 0.083% nebulizer solution Take 3 mLs by nebulization every 6 hours as needed for Wheezing. 9/24/14   Yunior Waterman MD   budesonide-formoterol (SYMBICORT) 160-4.5 MCG/ACT AERO Inhale 2 puffs into the lungs 2 times daily. 9/24/14   Yifan Navarro MD       REVIEW OF SYSTEMS    (2-9 systems for level 4, 10 ormore for level 5)      Review of Systems   Constitutional: Positive for activity change. Negative for diaphoresis, fatigue and unexpected weight change. HENT: Negative for congestion, ear discharge, rhinorrhea, sinus pressure, sinus pain, sneezing and voice change. Eyes: Negative. Respiratory: Positive for chest tightness and shortness of breath. Negative for cough, choking and wheezing. Cardiovascular: Negative.     Gastrointestinal: Negative for abdominal distention, abdominal pain, / Manquin Yesika / EKG):  Orders Placed This Encounter   Procedures    Culture, Blood 1    Culture, Blood 1    Culture, Urine    XR CHEST PORTABLE    CBC Auto Differential    Basic Metabolic Panel w/ Reflex to MG    Troponin    Brain Natriuretic Peptide    Troponin    Lactate, Sepsis    Urinalysis with Microscopic    APTT    Protime-INR    Respiratory Care Evaluation and Treat    POC Blood Gas and Chemistry    EKG 12 Lead       MEDICATIONS ORDERED:  Orders Placed This Encounter   Medications    bumetanide (BUMEX) tablet 1 mg    ipratropium-albuterol (DUONEB) nebulizer solution 1 ampule     Order Specific Question:   Initiate RT Bronchodilator Protocol     Answer:   Yes           DIAGNOSTIC RESULTS / EMERGENCY DEPARTMENT COURSE / MDM     LABS:  Results for orders placed or performed during the hospital encounter of 06/29/21   CBC Auto Differential   Result Value Ref Range    WBC 14.0 (H) 3.5 - 11.3 k/uL    RBC 3.27 (L) 3.95 - 5.11 m/uL    Hemoglobin 8.6 (L) 11.9 - 15.1 g/dL    Hematocrit 29.8 (L) 36.3 - 47.1 %    MCV 91.1 82.6 - 102.9 fL    MCH 26.3 25.2 - 33.5 pg    MCHC 28.9 28.4 - 34.8 g/dL    RDW 14.2 11.8 - 14.4 %    Platelets 853 995 - 815 k/uL    MPV 10.8 8.1 - 13.5 fL    NRBC Automated 0.0 0.0 per 100 WBC    Differential Type NOT REPORTED     Seg Neutrophils 79 (H) 36 - 65 %    Lymphocytes 13 (L) 24 - 43 %    Monocytes 5 3 - 12 %    Eosinophils % 2 1 - 4 %    Basophils 0 0 - 2 %    Immature Granulocytes 1 (H) 0 %    Segs Absolute 10.98 (H) 1.50 - 8.10 k/uL    Absolute Lymph # 1.78 1.10 - 3.70 k/uL    Absolute Mono # 0.72 0.10 - 1.20 k/uL    Absolute Eos # 0.31 0.00 - 0.44 k/uL    Basophils Absolute 0.04 0.00 - 0.20 k/uL    Absolute Immature Granulocyte 0.12 0.00 - 0.30 k/uL    WBC Morphology NOT REPORTED     RBC Morphology NOT REPORTED     Platelet Estimate NOT REPORTED    Basic Metabolic Panel w/ Reflex to MG   Result Value Ref Range    Glucose 182 (H) 70 - 99 mg/dL    BUN 16 6 - 20 mg/dL CREATININE 0.78 0.50 - 0.90 mg/dL    Bun/Cre Ratio NOT REPORTED 9 - 20    Calcium 8.8 8.6 - 10.4 mg/dL    Sodium 138 135 - 144 mmol/L    Potassium 3.6 (L) 3.7 - 5.3 mmol/L    Chloride 96 (L) 98 - 107 mmol/L    CO2 32 (H) 20 - 31 mmol/L    Anion Gap 10 9 - 17 mmol/L    GFR Non-African American >60 >60 mL/min    GFR African American >60 >60 mL/min    GFR Comment          GFR Staging NOT REPORTED    Troponin   Result Value Ref Range    Troponin, High Sensitivity <6 0 - 14 ng/L    Troponin T NOT REPORTED <0.03 ng/mL    Troponin Interp NOT REPORTED    Brain Natriuretic Peptide   Result Value Ref Range    Pro- <300 pg/mL    BNP Interpretation Pro-BNP Reference Range:    Troponin   Result Value Ref Range    Troponin, High Sensitivity <6 0 - 14 ng/L    Troponin T NOT REPORTED <0.03 ng/mL    Troponin Interp NOT REPORTED    Lactate, Sepsis   Result Value Ref Range    Lactic Acid, Sepsis NOT REPORTED 0.5 - 1.9 mmol/L    Lactic Acid, Sepsis, Whole Blood 1.7 0.5 - 1.9 mmol/L   APTT   Result Value Ref Range    PTT 20.1 (L) 20.5 - 30.5 sec   Protime-INR   Result Value Ref Range    Protime 10.6 9.1 - 12.3 sec    INR 1.0          IMPRESSION/MDM/ED COURSE:  39 y.o. female presented with shortness of breath for over a month exacerbated the last 2 days. Given patient's body habitus, story and past medical history patient will have full shortness of breath/cardiac work-up taken for her. Patient will also have respiratory come to evaluate as patient has taken multiple inhalers at home. Respiratory evaluate the patient. Did give 1 DuoNeb. Patient improvement after the DuoNeb. Patient oxygen requirement is decreasing. Patient satting at 94 to 100% on 3 L after receiving treatment. ED Course as of Jun 29 2134   Tue Jun 29, 2021 1817 Patient has no history of CHF which will prevent giving fluid bolus. Patient will have septic labs given for elevated white count, increased respirations.     [ES]   2123 WBC(!): 14.0 [ES] 2123 Hematocrit(!): 29.8 [ES]      ED Course User Index  [ES] Digna Valerio MD     WBC count returned elevated. Patient does meet SIRS criteria at this time. Will obtain sepsis work-up. Sepsis work-up negative for anything but elevated white count. Shared decision making plan took care with the patient. Importance of following up with primary care physician as well as understanding the patient is not in septic shock at this time with. Patient expressed understanding of need to follow-up PCP for reevaluation and medication changes. Also expressed understands return to the emergency room for new or worsening symptoms associated with her shortness of breath. Patient/Guardian requesting discharge. Patient/Guardian was given written and verbal instructions prior to discharge. Patient/Guardian understood and agreed. Patient/Guardian had no further questions. RADIOLOGY:  XR CHEST PORTABLE   Final Result   Cardiomegaly with associated mild pulmonary vascular congestion . EKG  Interpreted by me     Rhythm: normal sinus   Rate: normal  Axis: normal  Ectopy: none  Conduction: normal  ST Segments: no acute change  T Waves: no acute change  Q Waves: none     Clinical Impression: no acute changes        All EKG's are interpreted by the Emergency Department Physician who either signs or Co-signs this chart in the absence of a cardiologist.      PROCEDURES:  None    CONSULTS:  None        FINAL IMPRESSION      1. COPD exacerbation ([address]          DISPOSITION / PLAN     DISPOSITION Decision To Discharge 06/29/2021 09:27:35 PM      PATIENT REFERREDTO:  DO Shraddha Jain [address]   [address]  [phone]    Schedule an appointment as soon as possible for a visit in 2 days      [facility]  [address] [facility] [address] [phone]    As needed, If symptoms worsen      DISCHARGE MEDICATIONS:  New Prescriptions    No medications on file       Digna Valerio MD  PGY 1  Resident Physician Emergency Medicine  06/29/21 9:34 PM        (Please note that portions of this note were completed with a voice recognition program.Efforts were made to edit the dictations but occasionally words are mis-transcribed.)       Jay Arellano MD  Resident  06/29/21 1974

## 2021-07-04 NOTE — ED PROVIDER NOTES
[address]   Emergency Department  Emergency Medicine Attending Sign-out     Care of Alberto Shane was assumed from previous attending Dr. Anselmo Wood and is being seen for Shortness of Breath (increased dyspnea started last night, worse an hour and half ago)  . The patient's initial evaluation and plan have been discussed with the prior provider who initially evaluated the patient. Attestation  I was available and discussed any additional care issues that arose and coordinated the management plans with the resident(s) caring for the patient during my duty period. Any areas of disagreement with resident's documentation of care or procedures are noted on the chart. I was personally present for the key portions of any/all procedures, during my duty period. I have documented in the chart those procedures where I was not present during the key portions. BRIEF PATIENT SUMMARY/MDM COURSE PER INITIAL PROVIDER:   RECENT VITALS:      ,  Pulse: 83, Resp: 17, BP: (!) 148/84, SpO2: 96 %    This patient is a 39 y.o. Female with shortness of breath with acute exacerbation. Normally wears 3 L had to be increased to 3 L. COPD vs CHF. Patient is now back on 3L. She is discharged awaiting transport home.      DIAGNOSTICS/MEDICATIONS:     MEDICATIONS GIVEN:  ED Medication Orders (From admission, onward)    Start Ordered     Status Ordering Provider    06/29/21 1745 06/29/21 1735  bumetanide (BUMEX) tablet 1 mg  ONCE      Last MAR action: Given - by Easton Steele [facility] 06/29/21 [facility] [address] Reviewed   CBC WITH AUTO DIFFERENTIAL - Abnormal; Notable for the following components:       Result Value    WBC 14.0 (*)     RBC 3.27 (*)     Hemoglobin 8.6 (*)     Hematocrit 29.8 (*)     Seg Neutrophils 79 (*)     Lymphocytes 13 (*)     Immature Granulocytes 1 (*)     Segs Absolute 10.98 (*)     All other components within normal limits   BASIC METABOLIC PANEL W/ REFLEX TO MG FOR LOW K - Abnormal; Notable for the following components:    Glucose 182 (*)     Potassium 3.6 (*)     Chloride 96 (*)     CO2 32 (*)     All other components within normal limits   APTT - Abnormal; Notable for the following components:    PTT 20.1 (*)     All other components within normal limits   CULTURE, BLOOD 1   CULTURE, BLOOD 1   CULTURE, URINE   TROPONIN   BRAIN NATRIURETIC PEPTIDE   TROPONIN   LACTATE, SEPSIS   PROTIME-INR   URINALYSIS WITH MICROSCOPIC   POC BLOOD GAS AND CHEMISTRY       RADIOLOGY  XR CHEST PORTABLE    Result Date: 6/29/2021  EXAMINATION: ONE XRAY VIEW OF THE CHEST 6/29/2021 2:58 pm COMPARISON: 05/06/2019 HISTORY: ORDERING SYSTEM PROVIDED HISTORY: shortness of breath TECHNOLOGIST PROVIDED HISTORY: shortness of breath FINDINGS: Cardiac size is enlarged. No acute infiltrates are seen . The pulmonary vascularity is hazy and indistinct. No pneumothorax. No pleural effusions identified. Cardiomegaly with associated mild pulmonary vascular congestion . OUTSTANDING TASKS / ADDITIONAL COMMENTS   1.  Transport    Jose Carlos Cotto MD  Emergency Medicine Attending  [facility]       Yuly Irene MD  07/03/21 8714

## 2022-01-01 ENCOUNTER — APPOINTMENT (OUTPATIENT)
Dept: GENERAL RADIOLOGY | Age: 47
DRG: 193 | End: 2022-01-01
Payer: MEDICARE

## 2022-01-01 ENCOUNTER — APPOINTMENT (OUTPATIENT)
Dept: GENERAL RADIOLOGY | Age: 47
DRG: 207 | End: 2022-01-01
Payer: MEDICARE

## 2022-01-01 ENCOUNTER — APPOINTMENT (OUTPATIENT)
Dept: GENERAL RADIOLOGY | Age: 47
DRG: 291 | End: 2022-01-01
Payer: MEDICARE

## 2022-01-01 ENCOUNTER — HOSPITAL ENCOUNTER (INPATIENT)
Age: 47
LOS: 15 days | Discharge: HOME | DRG: 193 | End: 2022-04-02
Attending: EMERGENCY MEDICINE | Admitting: INTERNAL MEDICINE
Payer: MEDICARE

## 2022-01-01 ENCOUNTER — HOSPITAL ENCOUNTER (INPATIENT)
Age: 47
LOS: 9 days | DRG: 207 | End: 2022-06-17
Attending: EMERGENCY MEDICINE | Admitting: INTERNAL MEDICINE
Payer: MEDICARE

## 2022-01-01 ENCOUNTER — TELEPHONE (OUTPATIENT)
Dept: INTERNAL MEDICINE | Age: 47
End: 2022-01-01

## 2022-01-01 ENCOUNTER — TELEPHONE (OUTPATIENT)
Dept: PULMONOLOGY | Age: 47
End: 2022-01-01

## 2022-01-01 ENCOUNTER — HOSPITAL ENCOUNTER (INPATIENT)
Age: 47
LOS: 19 days | Discharge: HOME HEALTH | DRG: 291 | End: 2022-04-27
Attending: EMERGENCY MEDICINE | Admitting: INTERNAL MEDICINE
Payer: MEDICARE

## 2022-01-01 ENCOUNTER — APPOINTMENT (OUTPATIENT)
Dept: CT IMAGING | Age: 47
DRG: 193 | End: 2022-01-01
Payer: MEDICARE

## 2022-01-01 VITALS
TEMPERATURE: 98.9 F | RESPIRATION RATE: 22 BRPM | HEIGHT: 66 IN | DIASTOLIC BLOOD PRESSURE: 73 MMHG | BODY MASS INDEX: 47.09 KG/M2 | SYSTOLIC BLOOD PRESSURE: 117 MMHG | WEIGHT: 293 LBS | OXYGEN SATURATION: 94 % | HEART RATE: 95 BPM

## 2022-01-01 VITALS
RESPIRATION RATE: 22 BRPM | SYSTOLIC BLOOD PRESSURE: 141 MMHG | HEIGHT: 60 IN | DIASTOLIC BLOOD PRESSURE: 97 MMHG | BODY MASS INDEX: 57.52 KG/M2 | HEART RATE: 29 BPM | OXYGEN SATURATION: 54 % | WEIGHT: 293 LBS | TEMPERATURE: 101.5 F

## 2022-01-01 VITALS
TEMPERATURE: 98.2 F | HEIGHT: 66 IN | WEIGHT: 293 LBS | RESPIRATION RATE: 19 BRPM | BODY MASS INDEX: 47.09 KG/M2 | OXYGEN SATURATION: 94 % | HEART RATE: 75 BPM | SYSTOLIC BLOOD PRESSURE: 112 MMHG | DIASTOLIC BLOOD PRESSURE: 61 MMHG

## 2022-01-01 DIAGNOSIS — I50.9 ACUTE ON CHRONIC CONGESTIVE HEART FAILURE, UNSPECIFIED HEART FAILURE TYPE (HCC): Primary | ICD-10-CM

## 2022-01-01 DIAGNOSIS — J96.11 CHRONIC RESPIRATORY FAILURE WITH HYPOXIA AND HYPERCAPNIA (HCC): ICD-10-CM

## 2022-01-01 DIAGNOSIS — J96.12 CHRONIC RESPIRATORY FAILURE WITH HYPOXIA AND HYPERCAPNIA (HCC): ICD-10-CM

## 2022-01-01 DIAGNOSIS — J96.12 CHRONIC RESPIRATORY ACIDOSIS (HCC): ICD-10-CM

## 2022-01-01 DIAGNOSIS — I27.20 PULMONARY HYPERTENSION, MODERATE TO SEVERE (HCC): ICD-10-CM

## 2022-01-01 DIAGNOSIS — F11.20 CHRONIC NARCOTIC DEPENDENCE (HCC): Primary | ICD-10-CM

## 2022-01-01 DIAGNOSIS — J44.9 CHRONIC OBSTRUCTIVE PULMONARY DISEASE, UNSPECIFIED COPD TYPE (HCC): ICD-10-CM

## 2022-01-01 DIAGNOSIS — R06.03 RESPIRATORY DISTRESS: Primary | ICD-10-CM

## 2022-01-01 DIAGNOSIS — R06.00 DYSPNEA, UNSPECIFIED TYPE: ICD-10-CM

## 2022-01-01 DIAGNOSIS — I27.81 COR PULMONALE (CHRONIC) (HCC): ICD-10-CM

## 2022-01-01 DIAGNOSIS — F11.20 CHRONIC NARCOTIC DEPENDENCE (HCC): ICD-10-CM

## 2022-01-01 LAB
-: ABNORMAL
-: NORMAL
ABSOLUTE EOS #: 0 K/UL (ref 0–0.4)
ABSOLUTE EOS #: 0 K/UL (ref 0–0.44)
ABSOLUTE EOS #: 0.03 K/UL (ref 0–0.44)
ABSOLUTE EOS #: 0.05 K/UL (ref 0–0.44)
ABSOLUTE EOS #: 0.07 K/UL (ref 0–0.44)
ABSOLUTE EOS #: 0.1 K/UL (ref 0–0.44)
ABSOLUTE EOS #: 0.12 K/UL (ref 0–0.4)
ABSOLUTE EOS #: 0.12 K/UL (ref 0–0.44)
ABSOLUTE EOS #: 0.16 K/UL (ref 0–0.44)
ABSOLUTE EOS #: 0.16 K/UL (ref 0–0.44)
ABSOLUTE EOS #: 0.22 K/UL (ref 0–0.44)
ABSOLUTE EOS #: 0.25 K/UL (ref 0–0.44)
ABSOLUTE EOS #: 0.25 K/UL (ref 0–0.44)
ABSOLUTE EOS #: 0.26 K/UL (ref 0–0.4)
ABSOLUTE EOS #: 0.26 K/UL (ref 0–0.44)
ABSOLUTE EOS #: 0.32 K/UL (ref 0–0.4)
ABSOLUTE EOS #: 0.33 K/UL (ref 0–0.44)
ABSOLUTE EOS #: 0.5 K/UL (ref 0–0.44)
ABSOLUTE EOS #: <0.03 K/UL (ref 0–0.44)
ABSOLUTE IMMATURE GRANULOCYTE: 0 K/UL (ref 0–0.3)
ABSOLUTE IMMATURE GRANULOCYTE: 0 K/UL (ref 0–0.3)
ABSOLUTE IMMATURE GRANULOCYTE: 0.04 K/UL (ref 0–0.3)
ABSOLUTE IMMATURE GRANULOCYTE: 0.06 K/UL (ref 0–0.3)
ABSOLUTE IMMATURE GRANULOCYTE: 0.07 K/UL (ref 0–0.3)
ABSOLUTE IMMATURE GRANULOCYTE: 0.07 K/UL (ref 0–0.3)
ABSOLUTE IMMATURE GRANULOCYTE: 0.08 K/UL (ref 0–0.3)
ABSOLUTE IMMATURE GRANULOCYTE: 0.09 K/UL (ref 0–0.3)
ABSOLUTE IMMATURE GRANULOCYTE: 0.1 K/UL (ref 0–0.3)
ABSOLUTE IMMATURE GRANULOCYTE: 0.1 K/UL (ref 0–0.3)
ABSOLUTE IMMATURE GRANULOCYTE: 0.11 K/UL (ref 0–0.3)
ABSOLUTE IMMATURE GRANULOCYTE: 0.13 K/UL (ref 0–0.3)
ABSOLUTE IMMATURE GRANULOCYTE: 0.14 K/UL (ref 0–0.3)
ABSOLUTE IMMATURE GRANULOCYTE: 0.15 K/UL (ref 0–0.3)
ABSOLUTE IMMATURE GRANULOCYTE: 0.21 K/UL (ref 0–0.3)
ABSOLUTE IMMATURE GRANULOCYTE: 0.23 K/UL (ref 0–0.3)
ABSOLUTE IMMATURE GRANULOCYTE: 0.26 K/UL (ref 0–0.3)
ABSOLUTE IMMATURE GRANULOCYTE: 0.43 K/UL (ref 0–0.3)
ABSOLUTE IMMATURE GRANULOCYTE: 0.47 K/UL (ref 0–0.3)
ABSOLUTE IMMATURE GRANULOCYTE: 0.5 K/UL (ref 0–0.3)
ABSOLUTE IMMATURE GRANULOCYTE: 1.41 K/UL (ref 0–0.3)
ABSOLUTE LYMPH #: 0.27 K/UL (ref 1.1–3.7)
ABSOLUTE LYMPH #: 0.52 K/UL (ref 1–4.8)
ABSOLUTE LYMPH #: 0.71 K/UL (ref 1–4.8)
ABSOLUTE LYMPH #: 0.76 K/UL (ref 1.1–3.7)
ABSOLUTE LYMPH #: 0.79 K/UL (ref 1.1–3.7)
ABSOLUTE LYMPH #: 0.8 K/UL (ref 1.1–3.7)
ABSOLUTE LYMPH #: 0.95 K/UL (ref 1.1–3.7)
ABSOLUTE LYMPH #: 0.98 K/UL (ref 1.1–3.7)
ABSOLUTE LYMPH #: 1.02 K/UL (ref 1–4.8)
ABSOLUTE LYMPH #: 1.05 K/UL (ref 1.1–3.7)
ABSOLUTE LYMPH #: 1.16 K/UL (ref 1.1–3.7)
ABSOLUTE LYMPH #: 1.2 K/UL (ref 1–4.8)
ABSOLUTE LYMPH #: 1.21 K/UL (ref 1.1–3.7)
ABSOLUTE LYMPH #: 1.26 K/UL (ref 1.1–3.7)
ABSOLUTE LYMPH #: 1.28 K/UL (ref 1.1–3.7)
ABSOLUTE LYMPH #: 1.28 K/UL (ref 1–4.8)
ABSOLUTE LYMPH #: 1.31 K/UL (ref 1–4.8)
ABSOLUTE LYMPH #: 1.33 K/UL (ref 1.1–3.7)
ABSOLUTE LYMPH #: 1.43 K/UL (ref 1.1–3.7)
ABSOLUTE LYMPH #: 1.55 K/UL (ref 1.1–3.7)
ABSOLUTE LYMPH #: 1.55 K/UL (ref 1.1–3.7)
ABSOLUTE LYMPH #: 1.62 K/UL (ref 1.1–3.7)
ABSOLUTE LYMPH #: 1.66 K/UL (ref 1.1–3.7)
ABSOLUTE LYMPH #: 1.69 K/UL (ref 1–4.8)
ABSOLUTE LYMPH #: 1.84 K/UL (ref 1–4.8)
ABSOLUTE LYMPH #: 1.99 K/UL (ref 1.1–3.7)
ABSOLUTE LYMPH #: 2.03 K/UL (ref 1.1–3.7)
ABSOLUTE LYMPH #: 2.08 K/UL (ref 1.1–3.7)
ABSOLUTE LYMPH #: 2.21 K/UL (ref 1.1–3.7)
ABSOLUTE LYMPH #: 2.39 K/UL (ref 1.1–3.7)
ABSOLUTE LYMPH #: 2.41 K/UL (ref 1.1–3.7)
ABSOLUTE LYMPH #: 2.72 K/UL (ref 1.1–3.7)
ABSOLUTE MONO #: 0.27 K/UL (ref 0.1–1.2)
ABSOLUTE MONO #: 0.35 K/UL (ref 0.1–0.8)
ABSOLUTE MONO #: 0.35 K/UL (ref 0.1–0.8)
ABSOLUTE MONO #: 0.35 K/UL (ref 0.1–1.2)
ABSOLUTE MONO #: 0.46 K/UL (ref 0.1–1.2)
ABSOLUTE MONO #: 0.58 K/UL (ref 0.1–1.2)
ABSOLUTE MONO #: 0.63 K/UL (ref 0.1–1.2)
ABSOLUTE MONO #: 0.63 K/UL (ref 0.1–1.2)
ABSOLUTE MONO #: 0.68 K/UL (ref 0.1–0.8)
ABSOLUTE MONO #: 0.68 K/UL (ref 0.1–0.8)
ABSOLUTE MONO #: 0.72 K/UL (ref 0.1–0.8)
ABSOLUTE MONO #: 0.74 K/UL (ref 0.1–1.2)
ABSOLUTE MONO #: 0.75 K/UL (ref 0.1–1.2)
ABSOLUTE MONO #: 0.75 K/UL (ref 0.1–1.2)
ABSOLUTE MONO #: 0.76 K/UL (ref 0.1–1.2)
ABSOLUTE MONO #: 0.77 K/UL (ref 0.1–0.8)
ABSOLUTE MONO #: 0.79 K/UL (ref 0.1–1.2)
ABSOLUTE MONO #: 0.83 K/UL (ref 0.1–1.2)
ABSOLUTE MONO #: 0.83 K/UL (ref 0.1–1.2)
ABSOLUTE MONO #: 0.85 K/UL (ref 0.1–1.2)
ABSOLUTE MONO #: 0.87 K/UL (ref 0.1–1.2)
ABSOLUTE MONO #: 0.89 K/UL (ref 0.1–1.2)
ABSOLUTE MONO #: 0.91 K/UL (ref 0.1–1.2)
ABSOLUTE MONO #: 0.91 K/UL (ref 0.1–1.2)
ABSOLUTE MONO #: 0.93 K/UL (ref 0.1–1.2)
ABSOLUTE MONO #: 0.94 K/UL (ref 0.1–1.2)
ABSOLUTE MONO #: 1.03 K/UL (ref 0.1–1.2)
ABSOLUTE MONO #: 1.05 K/UL (ref 0.1–1.2)
ABSOLUTE MONO #: 1.46 K/UL (ref 0.1–1.2)
ABSOLUTE MONO #: 1.69 K/UL (ref 0.1–0.8)
ABSOLUTE MONO #: 1.76 K/UL (ref 0.1–1.2)
ABSOLUTE MONO #: 1.83 K/UL (ref 0.1–0.8)
ACTION: NORMAL
ADENOVIRUS PCR: NOT DETECTED
ADENOVIRUS PCR: NOT DETECTED
ALBUMIN SERPL-MCNC: 2.9 G/DL (ref 3.5–5.2)
ALBUMIN SERPL-MCNC: 2.9 G/DL (ref 3.5–5.2)
ALBUMIN SERPL-MCNC: 3.1 G/DL (ref 3.5–5.2)
ALBUMIN SERPL-MCNC: 3.8 G/DL (ref 3.5–5.2)
ALBUMIN/GLOBULIN RATIO: 0.7 (ref 1–2.5)
ALBUMIN/GLOBULIN RATIO: 0.8 (ref 1–2.5)
ALBUMIN/GLOBULIN RATIO: 0.9 (ref 1–2.5)
ALBUMIN/GLOBULIN RATIO: 1.2 (ref 1–2.5)
ALLEN TEST: ABNORMAL
ALLEN TEST: POSITIVE
ALP BLD-CCNC: 115 U/L (ref 35–104)
ALP BLD-CCNC: 123 U/L (ref 35–104)
ALP BLD-CCNC: 142 U/L (ref 35–104)
ALP BLD-CCNC: 147 U/L (ref 35–104)
ALT SERPL-CCNC: 175 U/L (ref 5–33)
ALT SERPL-CCNC: 473 U/L (ref 5–33)
ALT SERPL-CCNC: 57 U/L (ref 5–33)
ALT SERPL-CCNC: 717 U/L (ref 5–33)
ANION GAP SERPL CALCULATED.3IONS-SCNC: 10 MMOL/L (ref 9–17)
ANION GAP SERPL CALCULATED.3IONS-SCNC: 11 MMOL/L (ref 9–17)
ANION GAP SERPL CALCULATED.3IONS-SCNC: 12 MMOL/L (ref 9–17)
ANION GAP SERPL CALCULATED.3IONS-SCNC: 13 MMOL/L (ref 9–17)
ANION GAP SERPL CALCULATED.3IONS-SCNC: 14 MMOL/L (ref 9–17)
ANION GAP SERPL CALCULATED.3IONS-SCNC: 15 MMOL/L (ref 9–17)
ANION GAP SERPL CALCULATED.3IONS-SCNC: 16 MMOL/L (ref 9–17)
ANION GAP SERPL CALCULATED.3IONS-SCNC: 17 MMOL/L (ref 9–17)
ANION GAP SERPL CALCULATED.3IONS-SCNC: 18 MMOL/L (ref 9–17)
ANION GAP SERPL CALCULATED.3IONS-SCNC: 19 MMOL/L (ref 9–17)
ANION GAP SERPL CALCULATED.3IONS-SCNC: 21 MMOL/L (ref 9–17)
ANION GAP SERPL CALCULATED.3IONS-SCNC: 25 MMOL/L (ref 9–17)
ANION GAP SERPL CALCULATED.3IONS-SCNC: 32 MMOL/L (ref 9–17)
ANION GAP SERPL CALCULATED.3IONS-SCNC: 8 MMOL/L (ref 9–17)
ANION GAP SERPL CALCULATED.3IONS-SCNC: 9 MMOL/L (ref 9–17)
ANION GAP SERPL CALCULATED.3IONS-SCNC: 9 MMOL/L (ref 9–17)
ANION GAP: 15 MMOL/L (ref 7–16)
ANION GAP: 7 MMOL/L (ref 7–16)
AST SERPL-CCNC: 190 U/L
AST SERPL-CCNC: 34 U/L
AST SERPL-CCNC: 342 U/L
AST SERPL-CCNC: 688 U/L
BASOPHILS # BLD: 0 % (ref 0–2)
BASOPHILS ABSOLUTE: 0 K/UL (ref 0–0.2)
BASOPHILS ABSOLUTE: 0.03 K/UL (ref 0–0.2)
BASOPHILS ABSOLUTE: 0.04 K/UL (ref 0–0.2)
BASOPHILS ABSOLUTE: 0.05 K/UL (ref 0–0.2)
BASOPHILS ABSOLUTE: <0.03 K/UL (ref 0–0.2)
BILIRUB SERPL-MCNC: 0.81 MG/DL (ref 0.3–1.2)
BILIRUB SERPL-MCNC: 0.86 MG/DL (ref 0.3–1.2)
BILIRUB SERPL-MCNC: 0.89 MG/DL (ref 0.3–1.2)
BILIRUB SERPL-MCNC: 0.95 MG/DL (ref 0.3–1.2)
BILIRUBIN DIRECT: 0.5 MG/DL
BILIRUBIN DIRECT: 0.54 MG/DL
BILIRUBIN DIRECT: 0.69 MG/DL
BILIRUBIN URINE: NEGATIVE
BILIRUBIN URINE: NEGATIVE
BILIRUBIN, INDIRECT: 0.26 MG/DL (ref 0–1)
BILIRUBIN, INDIRECT: 0.31 MG/DL (ref 0–1)
BILIRUBIN, INDIRECT: 0.35 MG/DL (ref 0–1)
BORDETELLA PARAPERTUSSIS: NOT DETECTED
BORDETELLA PARAPERTUSSIS: NOT DETECTED
BORDETELLA PERTUSSIS PCR: NOT DETECTED
BORDETELLA PERTUSSIS PCR: NOT DETECTED
BUN BLDV-MCNC: 105 MG/DL (ref 6–20)
BUN BLDV-MCNC: 14 MG/DL (ref 6–20)
BUN BLDV-MCNC: 14 MG/DL (ref 6–20)
BUN BLDV-MCNC: 15 MG/DL (ref 6–20)
BUN BLDV-MCNC: 16 MG/DL (ref 6–20)
BUN BLDV-MCNC: 17 MG/DL (ref 6–20)
BUN BLDV-MCNC: 17 MG/DL (ref 6–20)
BUN BLDV-MCNC: 18 MG/DL (ref 6–20)
BUN BLDV-MCNC: 19 MG/DL (ref 6–20)
BUN BLDV-MCNC: 19 MG/DL (ref 6–20)
BUN BLDV-MCNC: 21 MG/DL (ref 6–20)
BUN BLDV-MCNC: 21 MG/DL (ref 6–20)
BUN BLDV-MCNC: 26 MG/DL (ref 6–20)
BUN BLDV-MCNC: 27 MG/DL (ref 6–20)
BUN BLDV-MCNC: 28 MG/DL (ref 6–20)
BUN BLDV-MCNC: 30 MG/DL (ref 6–20)
BUN BLDV-MCNC: 31 MG/DL (ref 6–20)
BUN BLDV-MCNC: 33 MG/DL (ref 6–20)
BUN BLDV-MCNC: 34 MG/DL (ref 6–20)
BUN BLDV-MCNC: 34 MG/DL (ref 6–20)
BUN BLDV-MCNC: 35 MG/DL (ref 6–20)
BUN BLDV-MCNC: 36 MG/DL (ref 6–20)
BUN BLDV-MCNC: 39 MG/DL (ref 6–20)
BUN BLDV-MCNC: 40 MG/DL (ref 6–20)
BUN BLDV-MCNC: 40 MG/DL (ref 6–20)
BUN BLDV-MCNC: 41 MG/DL (ref 6–20)
BUN BLDV-MCNC: 41 MG/DL (ref 6–20)
BUN BLDV-MCNC: 63 MG/DL (ref 6–20)
BUN BLDV-MCNC: 88 MG/DL (ref 6–20)
BUN BLDV-MCNC: 91 MG/DL (ref 6–20)
C-REACTIVE PROTEIN: 198.6 MG/L (ref 0–5)
C-REACTIVE PROTEIN: 225.7 MG/L (ref 0–5)
C-REACTIVE PROTEIN: 349.9 MG/L (ref 0–5)
CALCIUM SERPL-MCNC: 8.1 MG/DL (ref 8.6–10.4)
CALCIUM SERPL-MCNC: 8.3 MG/DL (ref 8.6–10.4)
CALCIUM SERPL-MCNC: 8.3 MG/DL (ref 8.6–10.4)
CALCIUM SERPL-MCNC: 8.4 MG/DL (ref 8.6–10.4)
CALCIUM SERPL-MCNC: 8.5 MG/DL (ref 8.6–10.4)
CALCIUM SERPL-MCNC: 8.6 MG/DL (ref 8.6–10.4)
CALCIUM SERPL-MCNC: 8.7 MG/DL (ref 8.6–10.4)
CALCIUM SERPL-MCNC: 8.8 MG/DL (ref 8.6–10.4)
CALCIUM SERPL-MCNC: 8.9 MG/DL (ref 8.6–10.4)
CALCIUM SERPL-MCNC: 9 MG/DL (ref 8.6–10.4)
CALCIUM SERPL-MCNC: 9.1 MG/DL (ref 8.6–10.4)
CALCIUM SERPL-MCNC: 9.1 MG/DL (ref 8.6–10.4)
CALCIUM SERPL-MCNC: 9.2 MG/DL (ref 8.6–10.4)
CALCIUM SERPL-MCNC: 9.3 MG/DL (ref 8.6–10.4)
CALCIUM SERPL-MCNC: 9.4 MG/DL (ref 8.6–10.4)
CALCIUM SERPL-MCNC: 9.7 MG/DL (ref 8.6–10.4)
CALCIUM SERPL-MCNC: 9.9 MG/DL (ref 8.6–10.4)
CARBOXYHEMOGLOBIN: 1.3 % (ref 0–5)
CARBOXYHEMOGLOBIN: 1.4 % (ref 0–5)
CASTS UA: NORMAL /LPF (ref 0–8)
CHLAMYDIA PNEUMONIAE BY PCR: NOT DETECTED
CHLAMYDIA PNEUMONIAE BY PCR: NOT DETECTED
CHLORIDE BLD-SCNC: 100 MMOL/L (ref 98–107)
CHLORIDE BLD-SCNC: 85 MMOL/L (ref 98–107)
CHLORIDE BLD-SCNC: 86 MMOL/L (ref 98–107)
CHLORIDE BLD-SCNC: 87 MMOL/L (ref 98–107)
CHLORIDE BLD-SCNC: 87 MMOL/L (ref 98–107)
CHLORIDE BLD-SCNC: 88 MMOL/L (ref 98–107)
CHLORIDE BLD-SCNC: 88 MMOL/L (ref 98–107)
CHLORIDE BLD-SCNC: 89 MMOL/L (ref 98–107)
CHLORIDE BLD-SCNC: 90 MMOL/L (ref 98–107)
CHLORIDE BLD-SCNC: 91 MMOL/L (ref 98–107)
CHLORIDE BLD-SCNC: 92 MMOL/L (ref 98–107)
CHLORIDE BLD-SCNC: 93 MMOL/L (ref 98–107)
CHLORIDE BLD-SCNC: 94 MMOL/L (ref 98–107)
CHLORIDE BLD-SCNC: 95 MMOL/L (ref 98–107)
CHLORIDE BLD-SCNC: 95 MMOL/L (ref 98–107)
CHLORIDE BLD-SCNC: 96 MMOL/L (ref 98–107)
CHLORIDE BLD-SCNC: 97 MMOL/L (ref 98–107)
CHLORIDE BLD-SCNC: 97 MMOL/L (ref 98–107)
CHLORIDE BLD-SCNC: 98 MMOL/L (ref 98–107)
CO2: 15 MMOL/L (ref 20–31)
CO2: 21 MMOL/L (ref 20–31)
CO2: 24 MMOL/L (ref 20–31)
CO2: 25 MMOL/L (ref 20–31)
CO2: 25 MMOL/L (ref 20–31)
CO2: 26 MMOL/L (ref 20–31)
CO2: 27 MMOL/L (ref 20–31)
CO2: 27 MMOL/L (ref 20–31)
CO2: 28 MMOL/L (ref 20–31)
CO2: 28 MMOL/L (ref 20–31)
CO2: 29 MMOL/L (ref 20–31)
CO2: 29 MMOL/L (ref 20–31)
CO2: 30 MMOL/L (ref 20–31)
CO2: 31 MMOL/L (ref 20–31)
CO2: 32 MMOL/L (ref 20–31)
CO2: 33 MMOL/L (ref 20–31)
CO2: 34 MMOL/L (ref 20–31)
CO2: 34 MMOL/L (ref 20–31)
CO2: 35 MMOL/L (ref 20–31)
CO2: 35 MMOL/L (ref 20–31)
CO2: 36 MMOL/L (ref 20–31)
CO2: 37 MMOL/L (ref 20–31)
CO2: 38 MMOL/L (ref 20–31)
CO2: 40 MMOL/L (ref 20–31)
CO2: 41 MMOL/L (ref 20–31)
CO2: 42 MMOL/L (ref 20–31)
COLOR: YELLOW
COLOR: YELLOW
CORONAVIRUS 229E PCR: NOT DETECTED
CORONAVIRUS 229E PCR: NOT DETECTED
CORONAVIRUS HKU1 PCR: NOT DETECTED
CORONAVIRUS HKU1 PCR: NOT DETECTED
CORONAVIRUS NL63 PCR: NOT DETECTED
CORONAVIRUS NL63 PCR: NOT DETECTED
CORONAVIRUS OC43 PCR: NOT DETECTED
CORONAVIRUS OC43 PCR: NOT DETECTED
CREAT SERPL-MCNC: 0.82 MG/DL (ref 0.5–0.9)
CREAT SERPL-MCNC: 0.87 MG/DL (ref 0.5–0.9)
CREAT SERPL-MCNC: 0.87 MG/DL (ref 0.5–0.9)
CREAT SERPL-MCNC: 0.88 MG/DL (ref 0.5–0.9)
CREAT SERPL-MCNC: 0.91 MG/DL (ref 0.5–0.9)
CREAT SERPL-MCNC: 0.94 MG/DL (ref 0.5–0.9)
CREAT SERPL-MCNC: 0.97 MG/DL (ref 0.5–0.9)
CREAT SERPL-MCNC: 0.98 MG/DL (ref 0.5–0.9)
CREAT SERPL-MCNC: 0.99 MG/DL (ref 0.5–0.9)
CREAT SERPL-MCNC: 0.99 MG/DL (ref 0.5–0.9)
CREAT SERPL-MCNC: 1.01 MG/DL (ref 0.5–0.9)
CREAT SERPL-MCNC: 1.02 MG/DL (ref 0.5–0.9)
CREAT SERPL-MCNC: 1.02 MG/DL (ref 0.5–0.9)
CREAT SERPL-MCNC: 1.04 MG/DL (ref 0.5–0.9)
CREAT SERPL-MCNC: 1.05 MG/DL (ref 0.5–0.9)
CREAT SERPL-MCNC: 1.06 MG/DL (ref 0.5–0.9)
CREAT SERPL-MCNC: 1.09 MG/DL (ref 0.5–0.9)
CREAT SERPL-MCNC: 1.09 MG/DL (ref 0.5–0.9)
CREAT SERPL-MCNC: 1.11 MG/DL (ref 0.5–0.9)
CREAT SERPL-MCNC: 1.12 MG/DL (ref 0.5–0.9)
CREAT SERPL-MCNC: 1.13 MG/DL (ref 0.5–0.9)
CREAT SERPL-MCNC: 1.16 MG/DL (ref 0.5–0.9)
CREAT SERPL-MCNC: 1.17 MG/DL (ref 0.5–0.9)
CREAT SERPL-MCNC: 1.18 MG/DL (ref 0.5–0.9)
CREAT SERPL-MCNC: 1.21 MG/DL (ref 0.5–0.9)
CREAT SERPL-MCNC: 1.21 MG/DL (ref 0.5–0.9)
CREAT SERPL-MCNC: 1.28 MG/DL (ref 0.5–0.9)
CREAT SERPL-MCNC: 1.29 MG/DL (ref 0.5–0.9)
CREAT SERPL-MCNC: 1.31 MG/DL (ref 0.5–0.9)
CREAT SERPL-MCNC: 1.44 MG/DL (ref 0.5–0.9)
CREAT SERPL-MCNC: 1.72 MG/DL (ref 0.5–0.9)
CREAT SERPL-MCNC: 2.34 MG/DL (ref 0.5–0.9)
CREAT SERPL-MCNC: 2.35 MG/DL (ref 0.5–0.9)
CREAT SERPL-MCNC: 2.71 MG/DL (ref 0.5–0.9)
CULTURE: ABNORMAL
CULTURE: ABNORMAL
CULTURE: NORMAL
D-DIMER QUANTITATIVE: 0.79 MG/L FEU
D-DIMER QUANTITATIVE: 0.86 MG/L FEU
DATE AND TIME: NORMAL
DIRECT EXAM: ABNORMAL
EKG ATRIAL RATE: 113 BPM
EKG ATRIAL RATE: 94 BPM
EKG ATRIAL RATE: 99 BPM
EKG P AXIS: 36 DEGREES
EKG P AXIS: 46 DEGREES
EKG P AXIS: 53 DEGREES
EKG P-R INTERVAL: 132 MS
EKG P-R INTERVAL: 148 MS
EKG P-R INTERVAL: 150 MS
EKG Q-T INTERVAL: 330 MS
EKG Q-T INTERVAL: 360 MS
EKG Q-T INTERVAL: 374 MS
EKG QRS DURATION: 80 MS
EKG QRS DURATION: 84 MS
EKG QRS DURATION: 88 MS
EKG QTC CALCULATION (BAZETT): 452 MS
EKG QTC CALCULATION (BAZETT): 462 MS
EKG QTC CALCULATION (BAZETT): 467 MS
EKG R AXIS: 36 DEGREES
EKG R AXIS: 53 DEGREES
EKG R AXIS: 74 DEGREES
EKG T AXIS: -17 DEGREES
EKG T AXIS: -17 DEGREES
EKG T AXIS: 1 DEGREES
EKG VENTRICULAR RATE: 113 BPM
EKG VENTRICULAR RATE: 94 BPM
EKG VENTRICULAR RATE: 99 BPM
EOSINOPHILS RELATIVE PERCENT: 0 % (ref 1–4)
EOSINOPHILS RELATIVE PERCENT: 1 % (ref 1–4)
EOSINOPHILS RELATIVE PERCENT: 2 % (ref 1–4)
EOSINOPHILS RELATIVE PERCENT: 3 % (ref 1–4)
EOSINOPHILS RELATIVE PERCENT: 4 % (ref 1–4)
EOSINOPHILS RELATIVE PERCENT: 6 % (ref 1–4)
EPITHELIAL CELLS UA: ABNORMAL /HPF (ref 0–5)
EPITHELIAL CELLS UA: NORMAL /HPF (ref 0–5)
ESTIMATED AVERAGE GLUCOSE: 137 MG/DL
ESTIMATED AVERAGE GLUCOSE: 148 MG/DL
FERRITIN: 1689 NG/ML (ref 13–150)
FERRITIN: 1719 NG/ML (ref 13–150)
FERRITIN: 327 NG/ML (ref 13–150)
FIBRINOGEN: 608 MG/DL (ref 140–420)
FIO2: 100
FIO2: 80
FIO2: ABNORMAL
FIO2: ABNORMAL
FLU A ANTIGEN: NEGATIVE
FLU B ANTIGEN: NEGATIVE
GFR AFRICAN AMERICAN: 23 ML/MIN
GFR AFRICAN AMERICAN: 27 ML/MIN
GFR AFRICAN AMERICAN: 27 ML/MIN
GFR AFRICAN AMERICAN: 39 ML/MIN
GFR AFRICAN AMERICAN: 47 ML/MIN
GFR AFRICAN AMERICAN: 53 ML/MIN
GFR AFRICAN AMERICAN: 54 ML/MIN
GFR AFRICAN AMERICAN: 54 ML/MIN
GFR AFRICAN AMERICAN: 58 ML/MIN
GFR AFRICAN AMERICAN: 58 ML/MIN
GFR AFRICAN AMERICAN: 60 ML/MIN
GFR AFRICAN AMERICAN: >60 ML/MIN
GFR NON-AFRICAN AMERICAN: 11 ML/MIN
GFR NON-AFRICAN AMERICAN: 19 ML/MIN
GFR NON-AFRICAN AMERICAN: 22 ML/MIN
GFR NON-AFRICAN AMERICAN: 22 ML/MIN
GFR NON-AFRICAN AMERICAN: 32 ML/MIN
GFR NON-AFRICAN AMERICAN: 39 ML/MIN
GFR NON-AFRICAN AMERICAN: 44 ML/MIN
GFR NON-AFRICAN AMERICAN: 44 ML/MIN
GFR NON-AFRICAN AMERICAN: 45 ML/MIN
GFR NON-AFRICAN AMERICAN: 48 ML/MIN
GFR NON-AFRICAN AMERICAN: 48 ML/MIN
GFR NON-AFRICAN AMERICAN: 49 ML/MIN
GFR NON-AFRICAN AMERICAN: 50 ML/MIN
GFR NON-AFRICAN AMERICAN: 50 ML/MIN
GFR NON-AFRICAN AMERICAN: 52 ML/MIN
GFR NON-AFRICAN AMERICAN: 53 ML/MIN
GFR NON-AFRICAN AMERICAN: 54 ML/MIN
GFR NON-AFRICAN AMERICAN: 54 ML/MIN
GFR NON-AFRICAN AMERICAN: 56 ML/MIN
GFR NON-AFRICAN AMERICAN: 57 ML/MIN
GFR NON-AFRICAN AMERICAN: 58 ML/MIN
GFR NON-AFRICAN AMERICAN: 58 ML/MIN
GFR NON-AFRICAN AMERICAN: 59 ML/MIN
GFR NON-AFRICAN AMERICAN: >60 ML/MIN
GFR SERPL CREATININE-BSD FRML MDRD: 13 ML/MIN
GFR SERPL CREATININE-BSD FRML MDRD: >60 ML/MIN
GFR SERPL CREATININE-BSD FRML MDRD: ABNORMAL ML/MIN/{1.73_M2}
GFR SERPL CREATININE-BSD FRML MDRD: NORMAL ML/MIN/{1.73_M2}
GLUCOSE BLD-MCNC: 100 MG/DL (ref 70–99)
GLUCOSE BLD-MCNC: 102 MG/DL (ref 65–105)
GLUCOSE BLD-MCNC: 103 MG/DL (ref 65–105)
GLUCOSE BLD-MCNC: 104 MG/DL (ref 65–105)
GLUCOSE BLD-MCNC: 105 MG/DL (ref 65–105)
GLUCOSE BLD-MCNC: 105 MG/DL (ref 70–99)
GLUCOSE BLD-MCNC: 106 MG/DL (ref 65–105)
GLUCOSE BLD-MCNC: 106 MG/DL (ref 65–105)
GLUCOSE BLD-MCNC: 107 MG/DL (ref 70–99)
GLUCOSE BLD-MCNC: 108 MG/DL (ref 65–105)
GLUCOSE BLD-MCNC: 109 MG/DL (ref 70–99)
GLUCOSE BLD-MCNC: 111 MG/DL (ref 70–99)
GLUCOSE BLD-MCNC: 112 MG/DL (ref 70–99)
GLUCOSE BLD-MCNC: 112 MG/DL (ref 70–99)
GLUCOSE BLD-MCNC: 113 MG/DL (ref 65–105)
GLUCOSE BLD-MCNC: 114 MG/DL (ref 65–105)
GLUCOSE BLD-MCNC: 115 MG/DL (ref 65–105)
GLUCOSE BLD-MCNC: 116 MG/DL (ref 65–105)
GLUCOSE BLD-MCNC: 116 MG/DL (ref 70–99)
GLUCOSE BLD-MCNC: 117 MG/DL (ref 65–105)
GLUCOSE BLD-MCNC: 119 MG/DL (ref 70–99)
GLUCOSE BLD-MCNC: 121 MG/DL (ref 65–105)
GLUCOSE BLD-MCNC: 123 MG/DL (ref 70–99)
GLUCOSE BLD-MCNC: 124 MG/DL (ref 70–99)
GLUCOSE BLD-MCNC: 125 MG/DL (ref 70–99)
GLUCOSE BLD-MCNC: 128 MG/DL (ref 65–105)
GLUCOSE BLD-MCNC: 129 MG/DL (ref 65–105)
GLUCOSE BLD-MCNC: 130 MG/DL (ref 65–105)
GLUCOSE BLD-MCNC: 131 MG/DL (ref 65–105)
GLUCOSE BLD-MCNC: 131 MG/DL (ref 65–105)
GLUCOSE BLD-MCNC: 133 MG/DL (ref 65–105)
GLUCOSE BLD-MCNC: 134 MG/DL (ref 65–105)
GLUCOSE BLD-MCNC: 134 MG/DL (ref 70–99)
GLUCOSE BLD-MCNC: 135 MG/DL (ref 65–105)
GLUCOSE BLD-MCNC: 137 MG/DL (ref 65–105)
GLUCOSE BLD-MCNC: 137 MG/DL (ref 65–105)
GLUCOSE BLD-MCNC: 139 MG/DL (ref 65–105)
GLUCOSE BLD-MCNC: 140 MG/DL (ref 65–105)
GLUCOSE BLD-MCNC: 140 MG/DL (ref 65–105)
GLUCOSE BLD-MCNC: 141 MG/DL (ref 65–105)
GLUCOSE BLD-MCNC: 141 MG/DL (ref 65–105)
GLUCOSE BLD-MCNC: 142 MG/DL (ref 70–99)
GLUCOSE BLD-MCNC: 143 MG/DL (ref 65–105)
GLUCOSE BLD-MCNC: 145 MG/DL (ref 65–105)
GLUCOSE BLD-MCNC: 145 MG/DL (ref 70–99)
GLUCOSE BLD-MCNC: 146 MG/DL (ref 65–105)
GLUCOSE BLD-MCNC: 147 MG/DL (ref 65–105)
GLUCOSE BLD-MCNC: 150 MG/DL (ref 65–105)
GLUCOSE BLD-MCNC: 151 MG/DL (ref 65–105)
GLUCOSE BLD-MCNC: 151 MG/DL (ref 65–105)
GLUCOSE BLD-MCNC: 152 MG/DL (ref 65–105)
GLUCOSE BLD-MCNC: 153 MG/DL (ref 65–105)
GLUCOSE BLD-MCNC: 153 MG/DL (ref 65–105)
GLUCOSE BLD-MCNC: 153 MG/DL (ref 70–99)
GLUCOSE BLD-MCNC: 153 MG/DL (ref 70–99)
GLUCOSE BLD-MCNC: 154 MG/DL (ref 65–105)
GLUCOSE BLD-MCNC: 154 MG/DL (ref 65–105)
GLUCOSE BLD-MCNC: 156 MG/DL (ref 65–105)
GLUCOSE BLD-MCNC: 156 MG/DL (ref 70–99)
GLUCOSE BLD-MCNC: 159 MG/DL (ref 65–105)
GLUCOSE BLD-MCNC: 159 MG/DL (ref 65–105)
GLUCOSE BLD-MCNC: 162 MG/DL (ref 65–105)
GLUCOSE BLD-MCNC: 163 MG/DL (ref 65–105)
GLUCOSE BLD-MCNC: 163 MG/DL (ref 65–105)
GLUCOSE BLD-MCNC: 164 MG/DL (ref 65–105)
GLUCOSE BLD-MCNC: 164 MG/DL (ref 70–99)
GLUCOSE BLD-MCNC: 165 MG/DL (ref 65–105)
GLUCOSE BLD-MCNC: 166 MG/DL (ref 65–105)
GLUCOSE BLD-MCNC: 167 MG/DL (ref 70–99)
GLUCOSE BLD-MCNC: 167 MG/DL (ref 70–99)
GLUCOSE BLD-MCNC: 168 MG/DL (ref 65–105)
GLUCOSE BLD-MCNC: 168 MG/DL (ref 65–105)
GLUCOSE BLD-MCNC: 169 MG/DL (ref 65–105)
GLUCOSE BLD-MCNC: 170 MG/DL (ref 65–105)
GLUCOSE BLD-MCNC: 171 MG/DL (ref 65–105)
GLUCOSE BLD-MCNC: 171 MG/DL (ref 65–105)
GLUCOSE BLD-MCNC: 174 MG/DL (ref 65–105)
GLUCOSE BLD-MCNC: 175 MG/DL (ref 65–105)
GLUCOSE BLD-MCNC: 175 MG/DL (ref 65–105)
GLUCOSE BLD-MCNC: 177 MG/DL (ref 65–105)
GLUCOSE BLD-MCNC: 178 MG/DL (ref 65–105)
GLUCOSE BLD-MCNC: 179 MG/DL (ref 65–105)
GLUCOSE BLD-MCNC: 179 MG/DL (ref 65–105)
GLUCOSE BLD-MCNC: 180 MG/DL (ref 65–105)
GLUCOSE BLD-MCNC: 180 MG/DL (ref 70–99)
GLUCOSE BLD-MCNC: 182 MG/DL (ref 65–105)
GLUCOSE BLD-MCNC: 183 MG/DL (ref 70–99)
GLUCOSE BLD-MCNC: 184 MG/DL (ref 65–105)
GLUCOSE BLD-MCNC: 184 MG/DL (ref 65–105)
GLUCOSE BLD-MCNC: 185 MG/DL (ref 65–105)
GLUCOSE BLD-MCNC: 186 MG/DL (ref 70–99)
GLUCOSE BLD-MCNC: 187 MG/DL (ref 70–99)
GLUCOSE BLD-MCNC: 189 MG/DL (ref 65–105)
GLUCOSE BLD-MCNC: 190 MG/DL (ref 65–105)
GLUCOSE BLD-MCNC: 190 MG/DL (ref 70–99)
GLUCOSE BLD-MCNC: 191 MG/DL (ref 65–105)
GLUCOSE BLD-MCNC: 191 MG/DL (ref 65–105)
GLUCOSE BLD-MCNC: 191 MG/DL (ref 74–100)
GLUCOSE BLD-MCNC: 192 MG/DL (ref 65–105)
GLUCOSE BLD-MCNC: 192 MG/DL (ref 70–99)
GLUCOSE BLD-MCNC: 194 MG/DL (ref 65–105)
GLUCOSE BLD-MCNC: 197 MG/DL (ref 65–105)
GLUCOSE BLD-MCNC: 198 MG/DL (ref 65–105)
GLUCOSE BLD-MCNC: 198 MG/DL (ref 65–105)
GLUCOSE BLD-MCNC: 199 MG/DL (ref 65–105)
GLUCOSE BLD-MCNC: 199 MG/DL (ref 70–99)
GLUCOSE BLD-MCNC: 200 MG/DL (ref 65–105)
GLUCOSE BLD-MCNC: 204 MG/DL (ref 65–105)
GLUCOSE BLD-MCNC: 205 MG/DL (ref 65–105)
GLUCOSE BLD-MCNC: 205 MG/DL (ref 65–105)
GLUCOSE BLD-MCNC: 207 MG/DL (ref 65–105)
GLUCOSE BLD-MCNC: 208 MG/DL (ref 65–105)
GLUCOSE BLD-MCNC: 208 MG/DL (ref 70–99)
GLUCOSE BLD-MCNC: 211 MG/DL (ref 65–105)
GLUCOSE BLD-MCNC: 211 MG/DL (ref 65–105)
GLUCOSE BLD-MCNC: 215 MG/DL (ref 74–100)
GLUCOSE BLD-MCNC: 216 MG/DL (ref 65–105)
GLUCOSE BLD-MCNC: 216 MG/DL (ref 70–99)
GLUCOSE BLD-MCNC: 217 MG/DL (ref 65–105)
GLUCOSE BLD-MCNC: 218 MG/DL (ref 65–105)
GLUCOSE BLD-MCNC: 219 MG/DL (ref 65–105)
GLUCOSE BLD-MCNC: 220 MG/DL (ref 65–105)
GLUCOSE BLD-MCNC: 224 MG/DL (ref 65–105)
GLUCOSE BLD-MCNC: 225 MG/DL (ref 70–99)
GLUCOSE BLD-MCNC: 225 MG/DL (ref 70–99)
GLUCOSE BLD-MCNC: 226 MG/DL (ref 65–105)
GLUCOSE BLD-MCNC: 227 MG/DL (ref 65–105)
GLUCOSE BLD-MCNC: 227 MG/DL (ref 65–105)
GLUCOSE BLD-MCNC: 228 MG/DL (ref 65–105)
GLUCOSE BLD-MCNC: 229 MG/DL (ref 65–105)
GLUCOSE BLD-MCNC: 230 MG/DL (ref 74–100)
GLUCOSE BLD-MCNC: 232 MG/DL (ref 65–105)
GLUCOSE BLD-MCNC: 232 MG/DL (ref 74–100)
GLUCOSE BLD-MCNC: 234 MG/DL (ref 70–99)
GLUCOSE BLD-MCNC: 236 MG/DL (ref 65–105)
GLUCOSE BLD-MCNC: 237 MG/DL (ref 65–105)
GLUCOSE BLD-MCNC: 238 MG/DL (ref 70–99)
GLUCOSE BLD-MCNC: 239 MG/DL (ref 65–105)
GLUCOSE BLD-MCNC: 240 MG/DL (ref 65–105)
GLUCOSE BLD-MCNC: 240 MG/DL (ref 65–105)
GLUCOSE BLD-MCNC: 241 MG/DL (ref 65–105)
GLUCOSE BLD-MCNC: 241 MG/DL (ref 70–99)
GLUCOSE BLD-MCNC: 242 MG/DL (ref 74–100)
GLUCOSE BLD-MCNC: 245 MG/DL (ref 65–105)
GLUCOSE BLD-MCNC: 245 MG/DL (ref 70–99)
GLUCOSE BLD-MCNC: 250 MG/DL (ref 65–105)
GLUCOSE BLD-MCNC: 250 MG/DL (ref 65–105)
GLUCOSE BLD-MCNC: 251 MG/DL (ref 65–105)
GLUCOSE BLD-MCNC: 252 MG/DL (ref 65–105)
GLUCOSE BLD-MCNC: 252 MG/DL (ref 65–105)
GLUCOSE BLD-MCNC: 252 MG/DL (ref 70–99)
GLUCOSE BLD-MCNC: 253 MG/DL (ref 65–105)
GLUCOSE BLD-MCNC: 254 MG/DL (ref 65–105)
GLUCOSE BLD-MCNC: 254 MG/DL (ref 65–105)
GLUCOSE BLD-MCNC: 255 MG/DL (ref 65–105)
GLUCOSE BLD-MCNC: 260 MG/DL (ref 65–105)
GLUCOSE BLD-MCNC: 260 MG/DL (ref 65–105)
GLUCOSE BLD-MCNC: 262 MG/DL (ref 65–105)
GLUCOSE BLD-MCNC: 263 MG/DL (ref 65–105)
GLUCOSE BLD-MCNC: 267 MG/DL (ref 65–105)
GLUCOSE BLD-MCNC: 267 MG/DL (ref 74–100)
GLUCOSE BLD-MCNC: 268 MG/DL (ref 65–105)
GLUCOSE BLD-MCNC: 269 MG/DL (ref 65–105)
GLUCOSE BLD-MCNC: 277 MG/DL (ref 65–105)
GLUCOSE BLD-MCNC: 282 MG/DL (ref 65–105)
GLUCOSE BLD-MCNC: 282 MG/DL (ref 65–105)
GLUCOSE BLD-MCNC: 283 MG/DL (ref 65–105)
GLUCOSE BLD-MCNC: 283 MG/DL (ref 70–99)
GLUCOSE BLD-MCNC: 284 MG/DL (ref 65–105)
GLUCOSE BLD-MCNC: 284 MG/DL (ref 65–105)
GLUCOSE BLD-MCNC: 286 MG/DL (ref 65–105)
GLUCOSE BLD-MCNC: 287 MG/DL (ref 65–105)
GLUCOSE BLD-MCNC: 288 MG/DL (ref 65–105)
GLUCOSE BLD-MCNC: 290 MG/DL (ref 65–105)
GLUCOSE BLD-MCNC: 290 MG/DL (ref 65–105)
GLUCOSE BLD-MCNC: 293 MG/DL (ref 65–105)
GLUCOSE BLD-MCNC: 295 MG/DL (ref 65–105)
GLUCOSE BLD-MCNC: 295 MG/DL (ref 65–105)
GLUCOSE BLD-MCNC: 295 MG/DL (ref 70–99)
GLUCOSE BLD-MCNC: 300 MG/DL (ref 70–99)
GLUCOSE BLD-MCNC: 304 MG/DL (ref 65–105)
GLUCOSE BLD-MCNC: 305 MG/DL (ref 65–105)
GLUCOSE BLD-MCNC: 311 MG/DL (ref 65–105)
GLUCOSE BLD-MCNC: 314 MG/DL (ref 65–105)
GLUCOSE BLD-MCNC: 315 MG/DL (ref 65–105)
GLUCOSE BLD-MCNC: 316 MG/DL (ref 65–105)
GLUCOSE BLD-MCNC: 322 MG/DL (ref 65–105)
GLUCOSE BLD-MCNC: 322 MG/DL (ref 74–100)
GLUCOSE BLD-MCNC: 354 MG/DL (ref 65–105)
GLUCOSE BLD-MCNC: 354 MG/DL (ref 65–105)
GLUCOSE BLD-MCNC: 355 MG/DL (ref 70–99)
GLUCOSE BLD-MCNC: 359 MG/DL (ref 65–105)
GLUCOSE BLD-MCNC: 380 MG/DL (ref 65–105)
GLUCOSE BLD-MCNC: 393 MG/DL (ref 65–105)
GLUCOSE BLD-MCNC: 423 MG/DL (ref 65–105)
GLUCOSE BLD-MCNC: 88 MG/DL (ref 65–105)
GLUCOSE BLD-MCNC: 94 MG/DL (ref 70–99)
GLUCOSE BLD-MCNC: NORMAL MG/DL (ref 74–100)
GLUCOSE URINE: NEGATIVE
GLUCOSE URINE: NEGATIVE
HBA1C MFR BLD: 6.4 % (ref 4–6)
HBA1C MFR BLD: 6.8 % (ref 4–6)
HCG QUALITATIVE: NEGATIVE
HCG QUALITATIVE: NEGATIVE
HCO3 VENOUS: 34.3 MMOL/L (ref 22–29)
HCO3 VENOUS: 34.8 MMOL/L (ref 24–30)
HCO3 VENOUS: 37 MMOL/L (ref 24–30)
HCT VFR BLD CALC: 28.8 % (ref 36.3–47.1)
HCT VFR BLD CALC: 29.5 % (ref 36.3–47.1)
HCT VFR BLD CALC: 29.8 % (ref 36.3–47.1)
HCT VFR BLD CALC: 30.6 % (ref 36.3–47.1)
HCT VFR BLD CALC: 31.1 % (ref 36.3–47.1)
HCT VFR BLD CALC: 32 % (ref 36.3–47.1)
HCT VFR BLD CALC: 32.2 % (ref 36.3–47.1)
HCT VFR BLD CALC: 32.4 % (ref 36.3–47.1)
HCT VFR BLD CALC: 32.5 % (ref 36.3–47.1)
HCT VFR BLD CALC: 32.7 % (ref 36.3–47.1)
HCT VFR BLD CALC: 32.8 % (ref 36.3–47.1)
HCT VFR BLD CALC: 33.2 % (ref 36.3–47.1)
HCT VFR BLD CALC: 33.7 % (ref 36.3–47.1)
HCT VFR BLD CALC: 33.8 % (ref 36.3–47.1)
HCT VFR BLD CALC: 34 % (ref 36.3–47.1)
HCT VFR BLD CALC: 34.3 % (ref 36.3–47.1)
HCT VFR BLD CALC: 34.4 % (ref 36.3–47.1)
HCT VFR BLD CALC: 35.1 % (ref 36.3–47.1)
HCT VFR BLD CALC: 35.4 % (ref 36.3–47.1)
HCT VFR BLD CALC: 35.5 % (ref 36.3–47.1)
HCT VFR BLD CALC: 35.6 % (ref 36.3–47.1)
HCT VFR BLD CALC: 35.6 % (ref 36.3–47.1)
HCT VFR BLD CALC: 35.7 % (ref 36.3–47.1)
HCT VFR BLD CALC: 35.7 % (ref 36.3–47.1)
HCT VFR BLD CALC: 35.8 % (ref 36.3–47.1)
HCT VFR BLD CALC: 36.3 % (ref 36.3–47.1)
HCT VFR BLD CALC: 36.5 % (ref 36.3–47.1)
HCT VFR BLD CALC: 36.7 % (ref 36.3–47.1)
HCT VFR BLD CALC: 37.2 % (ref 36.3–47.1)
HCT VFR BLD CALC: 39.1 % (ref 36.3–47.1)
HCT VFR BLD CALC: 40 % (ref 36.3–47.1)
HCT VFR BLD CALC: 40.7 % (ref 36.3–47.1)
HEMOGLOBIN: 10 G/DL (ref 11.9–15.1)
HEMOGLOBIN: 10 G/DL (ref 11.9–15.1)
HEMOGLOBIN: 10.1 G/DL (ref 11.9–15.1)
HEMOGLOBIN: 10.3 G/DL (ref 11.9–15.1)
HEMOGLOBIN: 10.4 G/DL (ref 11.9–15.1)
HEMOGLOBIN: 10.4 G/DL (ref 11.9–15.1)
HEMOGLOBIN: 10.5 G/DL (ref 11.9–15.1)
HEMOGLOBIN: 10.5 G/DL (ref 11.9–15.1)
HEMOGLOBIN: 10.7 G/DL (ref 11.9–15.1)
HEMOGLOBIN: 11.1 G/DL (ref 11.9–15.1)
HEMOGLOBIN: 11.8 G/DL (ref 11.9–15.1)
HEMOGLOBIN: 12.4 G/DL (ref 11.9–15.1)
HEMOGLOBIN: 12.4 G/DL (ref 11.9–15.1)
HEMOGLOBIN: 8.5 G/DL (ref 11.9–15.1)
HEMOGLOBIN: 8.7 G/DL (ref 11.9–15.1)
HEMOGLOBIN: 8.9 G/DL (ref 11.9–15.1)
HEMOGLOBIN: 9.1 G/DL (ref 11.9–15.1)
HEMOGLOBIN: 9.2 G/DL (ref 11.9–15.1)
HEMOGLOBIN: 9.2 G/DL (ref 11.9–15.1)
HEMOGLOBIN: 9.3 G/DL (ref 11.9–15.1)
HEMOGLOBIN: 9.4 G/DL (ref 11.9–15.1)
HEMOGLOBIN: 9.6 G/DL (ref 11.9–15.1)
HEMOGLOBIN: 9.7 G/DL (ref 11.9–15.1)
HEMOGLOBIN: 9.7 G/DL (ref 11.9–15.1)
HEMOGLOBIN: 9.8 G/DL (ref 11.9–15.1)
HUMAN METAPNEUMOVIRUS PCR: NOT DETECTED
HUMAN METAPNEUMOVIRUS PCR: NOT DETECTED
IMMATURE GRANULOCYTES: 0 %
IMMATURE GRANULOCYTES: 1 %
IMMATURE GRANULOCYTES: 2 %
IMMATURE GRANULOCYTES: 4 %
IMMATURE GRANULOCYTES: 5 %
INFLUENZA A BY PCR: NOT DETECTED
INFLUENZA A BY PCR: NOT DETECTED
INFLUENZA B BY PCR: NOT DETECTED
INFLUENZA B BY PCR: NOT DETECTED
INTERVENTION: NORMAL
IRON SATURATION: 15 % (ref 20–55)
IRON: 36 UG/DL (ref 37–145)
KETONES, URINE: NEGATIVE
KETONES, URINE: NEGATIVE
LACTIC ACID, WHOLE BLOOD: 1.7 MMOL/L (ref 0.7–2.1)
LACTIC ACID, WHOLE BLOOD: 2.4 MMOL/L (ref 0.7–2.1)
LACTIC ACID, WHOLE BLOOD: 4.7 MMOL/L (ref 0.7–2.1)
LEGIONELLA PNEUMOPHILIA AG, URINE: NEGATIVE
LEUKOCYTE ESTERASE, URINE: ABNORMAL
LEUKOCYTE ESTERASE, URINE: NEGATIVE
LV EF: 60 %
LV EF: 65 %
LVEF MODALITY: NORMAL
LVEF MODALITY: NORMAL
LYMPHOCYTES # BLD: 10 % (ref 24–43)
LYMPHOCYTES # BLD: 10 % (ref 24–43)
LYMPHOCYTES # BLD: 12 % (ref 24–43)
LYMPHOCYTES # BLD: 13 % (ref 24–43)
LYMPHOCYTES # BLD: 14 % (ref 24–43)
LYMPHOCYTES # BLD: 15 % (ref 24–43)
LYMPHOCYTES # BLD: 15 % (ref 24–44)
LYMPHOCYTES # BLD: 15 % (ref 24–44)
LYMPHOCYTES # BLD: 16 % (ref 24–43)
LYMPHOCYTES # BLD: 16 % (ref 24–44)
LYMPHOCYTES # BLD: 17 % (ref 24–43)
LYMPHOCYTES # BLD: 18 % (ref 24–43)
LYMPHOCYTES # BLD: 18 % (ref 24–43)
LYMPHOCYTES # BLD: 19 % (ref 24–43)
LYMPHOCYTES # BLD: 20 % (ref 24–43)
LYMPHOCYTES # BLD: 20 % (ref 24–43)
LYMPHOCYTES # BLD: 21 % (ref 24–43)
LYMPHOCYTES # BLD: 21 % (ref 24–43)
LYMPHOCYTES # BLD: 24 % (ref 24–43)
LYMPHOCYTES # BLD: 3 % (ref 24–43)
LYMPHOCYTES # BLD: 4 % (ref 24–44)
LYMPHOCYTES # BLD: 5 % (ref 24–43)
LYMPHOCYTES # BLD: 5 % (ref 24–43)
LYMPHOCYTES # BLD: 5 % (ref 24–44)
LYMPHOCYTES # BLD: 6 % (ref 24–43)
LYMPHOCYTES # BLD: 6 % (ref 24–44)
LYMPHOCYTES # BLD: 8 % (ref 24–43)
LYMPHOCYTES # BLD: 9 % (ref 24–43)
Lab: NORMAL
MAGNESIUM: 1.4 MG/DL (ref 1.6–2.6)
MAGNESIUM: 1.5 MG/DL (ref 1.6–2.6)
MAGNESIUM: 1.6 MG/DL (ref 1.6–2.6)
MAGNESIUM: 1.7 MG/DL (ref 1.6–2.6)
MAGNESIUM: 1.8 MG/DL (ref 1.6–2.6)
MAGNESIUM: 1.9 MG/DL (ref 1.6–2.6)
MCH RBC QN AUTO: 24.5 PG (ref 25.2–33.5)
MCH RBC QN AUTO: 25.1 PG (ref 25.2–33.5)
MCH RBC QN AUTO: 25.1 PG (ref 25.2–33.5)
MCH RBC QN AUTO: 25.2 PG (ref 25.2–33.5)
MCH RBC QN AUTO: 25.3 PG (ref 25.2–33.5)
MCH RBC QN AUTO: 25.4 PG (ref 25.2–33.5)
MCH RBC QN AUTO: 25.6 PG (ref 25.2–33.5)
MCH RBC QN AUTO: 25.7 PG (ref 25.2–33.5)
MCH RBC QN AUTO: 25.8 PG (ref 25.2–33.5)
MCH RBC QN AUTO: 25.9 PG (ref 25.2–33.5)
MCH RBC QN AUTO: 25.9 PG (ref 25.2–33.5)
MCH RBC QN AUTO: 26 PG (ref 25.2–33.5)
MCH RBC QN AUTO: 26 PG (ref 25.2–33.5)
MCH RBC QN AUTO: 26.2 PG (ref 25.2–33.5)
MCH RBC QN AUTO: 26.2 PG (ref 25.2–33.5)
MCH RBC QN AUTO: 26.4 PG (ref 25.2–33.5)
MCH RBC QN AUTO: 26.4 PG (ref 25.2–33.5)
MCH RBC QN AUTO: 26.5 PG (ref 25.2–33.5)
MCH RBC QN AUTO: 26.7 PG (ref 25.2–33.5)
MCH RBC QN AUTO: 26.9 PG (ref 25.2–33.5)
MCH RBC QN AUTO: 27 PG (ref 25.2–33.5)
MCH RBC QN AUTO: 27.1 PG (ref 25.2–33.5)
MCHC RBC AUTO-ENTMCNC: 26.9 G/DL (ref 28.4–34.8)
MCHC RBC AUTO-ENTMCNC: 27.2 G/DL (ref 28.4–34.8)
MCHC RBC AUTO-ENTMCNC: 27.8 G/DL (ref 28.4–34.8)
MCHC RBC AUTO-ENTMCNC: 28.2 G/DL (ref 28.4–34.8)
MCHC RBC AUTO-ENTMCNC: 28.3 G/DL (ref 28.4–34.8)
MCHC RBC AUTO-ENTMCNC: 28.4 G/DL (ref 28.4–34.8)
MCHC RBC AUTO-ENTMCNC: 28.4 G/DL (ref 28.4–34.8)
MCHC RBC AUTO-ENTMCNC: 28.5 G/DL (ref 28.4–34.8)
MCHC RBC AUTO-ENTMCNC: 28.8 G/DL (ref 28.4–34.8)
MCHC RBC AUTO-ENTMCNC: 28.8 G/DL (ref 28.4–34.8)
MCHC RBC AUTO-ENTMCNC: 28.9 G/DL (ref 28.4–34.8)
MCHC RBC AUTO-ENTMCNC: 29.1 G/DL (ref 28.4–34.8)
MCHC RBC AUTO-ENTMCNC: 29.2 G/DL (ref 28.4–34.8)
MCHC RBC AUTO-ENTMCNC: 29.3 G/DL (ref 28.4–34.8)
MCHC RBC AUTO-ENTMCNC: 29.3 G/DL (ref 28.4–34.8)
MCHC RBC AUTO-ENTMCNC: 29.4 G/DL (ref 28.4–34.8)
MCHC RBC AUTO-ENTMCNC: 29.5 G/DL (ref 28.4–34.8)
MCHC RBC AUTO-ENTMCNC: 29.5 G/DL (ref 28.4–34.8)
MCHC RBC AUTO-ENTMCNC: 29.6 G/DL (ref 28.4–34.8)
MCHC RBC AUTO-ENTMCNC: 29.9 G/DL (ref 28.4–34.8)
MCHC RBC AUTO-ENTMCNC: 29.9 G/DL (ref 28.4–34.8)
MCHC RBC AUTO-ENTMCNC: 30.1 G/DL (ref 28.4–34.8)
MCHC RBC AUTO-ENTMCNC: 30.2 G/DL (ref 28.4–34.8)
MCHC RBC AUTO-ENTMCNC: 30.5 G/DL (ref 28.4–34.8)
MCHC RBC AUTO-ENTMCNC: 31 G/DL (ref 28.4–34.8)
MCHC RBC AUTO-ENTMCNC: 31.3 G/DL (ref 28.4–34.8)
MCV RBC AUTO: 86.2 FL (ref 82.6–102.9)
MCV RBC AUTO: 86.8 FL (ref 82.6–102.9)
MCV RBC AUTO: 87 FL (ref 82.6–102.9)
MCV RBC AUTO: 87.1 FL (ref 82.6–102.9)
MCV RBC AUTO: 87.3 FL (ref 82.6–102.9)
MCV RBC AUTO: 87.5 FL (ref 82.6–102.9)
MCV RBC AUTO: 87.5 FL (ref 82.6–102.9)
MCV RBC AUTO: 87.6 FL (ref 82.6–102.9)
MCV RBC AUTO: 87.7 FL (ref 82.6–102.9)
MCV RBC AUTO: 87.9 FL (ref 82.6–102.9)
MCV RBC AUTO: 87.9 FL (ref 82.6–102.9)
MCV RBC AUTO: 88.1 FL (ref 82.6–102.9)
MCV RBC AUTO: 88.2 FL (ref 82.6–102.9)
MCV RBC AUTO: 88.4 FL (ref 82.6–102.9)
MCV RBC AUTO: 88.4 FL (ref 82.6–102.9)
MCV RBC AUTO: 88.5 FL (ref 82.6–102.9)
MCV RBC AUTO: 88.5 FL (ref 82.6–102.9)
MCV RBC AUTO: 88.6 FL (ref 82.6–102.9)
MCV RBC AUTO: 88.8 FL (ref 82.6–102.9)
MCV RBC AUTO: 88.8 FL (ref 82.6–102.9)
MCV RBC AUTO: 88.9 FL (ref 82.6–102.9)
MCV RBC AUTO: 89.2 FL (ref 82.6–102.9)
MCV RBC AUTO: 89.4 FL (ref 82.6–102.9)
MCV RBC AUTO: 89.8 FL (ref 82.6–102.9)
MCV RBC AUTO: 90.5 FL (ref 82.6–102.9)
MCV RBC AUTO: 90.6 FL (ref 82.6–102.9)
MCV RBC AUTO: 90.8 FL (ref 82.6–102.9)
MCV RBC AUTO: 90.9 FL (ref 82.6–102.9)
MCV RBC AUTO: 91.1 FL (ref 82.6–102.9)
MCV RBC AUTO: 91.5 FL (ref 82.6–102.9)
MCV RBC AUTO: 91.9 FL (ref 82.6–102.9)
MCV RBC AUTO: 92.6 FL (ref 82.6–102.9)
METHEMOGLOBIN: 0.4 % (ref 0–1.5)
METHEMOGLOBIN: 0.5 % (ref 0–1.5)
METHEMOGLOBIN: 0.6 % (ref 0–1.5)
METHEMOGLOBIN: 1.1 % (ref 0–1.5)
MODE: ABNORMAL
MODE: NORMAL
MONOCYTES # BLD: 11 % (ref 3–12)
MONOCYTES # BLD: 3 % (ref 1–7)
MONOCYTES # BLD: 3 % (ref 1–7)
MONOCYTES # BLD: 3 % (ref 3–12)
MONOCYTES # BLD: 4 % (ref 1–7)
MONOCYTES # BLD: 5 % (ref 3–12)
MONOCYTES # BLD: 5 % (ref 3–12)
MONOCYTES # BLD: 6 % (ref 1–7)
MONOCYTES # BLD: 6 % (ref 1–7)
MONOCYTES # BLD: 6 % (ref 3–12)
MONOCYTES # BLD: 6 % (ref 3–12)
MONOCYTES # BLD: 7 % (ref 1–7)
MONOCYTES # BLD: 7 % (ref 3–12)
MONOCYTES # BLD: 8 % (ref 1–7)
MONOCYTES # BLD: 8 % (ref 3–12)
MONOCYTES # BLD: 9 % (ref 1–7)
MONOCYTES # BLD: 9 % (ref 3–12)
MORPHOLOGY: ABNORMAL
MORPHOLOGY: NORMAL
MRSA, DNA, NASAL: ABNORMAL
MYCOPLASMA PNEUMONIAE IGM: 0.37
MYCOPLASMA PNEUMONIAE PCR: NOT DETECTED
MYCOPLASMA PNEUMONIAE PCR: NOT DETECTED
NEGATIVE BASE EXCESS, ART: 18 (ref 0–2)
NITRITE, URINE: NEGATIVE
NITRITE, URINE: NEGATIVE
NOTIFY: NORMAL
NRBC AUTOMATED: 0 PER 100 WBC
NRBC AUTOMATED: 0.2 PER 100 WBC
NRBC AUTOMATED: 0.3 PER 100 WBC
NRBC AUTOMATED: 0.4 PER 100 WBC
NRBC AUTOMATED: 0.4 PER 100 WBC
NRBC AUTOMATED: 0.5 PER 100 WBC
NRBC AUTOMATED: 0.7 PER 100 WBC
NUCLEATED RED BLOOD CELLS: 1 PER 100 WBC
O2 DEVICE/FLOW/%: ABNORMAL
O2 DEVICE/FLOW/%: NORMAL
O2 SAT, VEN: 59.1 % (ref 60–85)
O2 SAT, VEN: 62 % (ref 60–85)
O2 SAT, VEN: 69.3 % (ref 60–85)
PARAINFLUENZA 1 PCR: NOT DETECTED
PARAINFLUENZA 1 PCR: NOT DETECTED
PARAINFLUENZA 2 PCR: NOT DETECTED
PARAINFLUENZA 2 PCR: NOT DETECTED
PARAINFLUENZA 3 PCR: NOT DETECTED
PARAINFLUENZA 3 PCR: NOT DETECTED
PARAINFLUENZA 4 PCR: NOT DETECTED
PARAINFLUENZA 4 PCR: NOT DETECTED
PATIENT TEMP: 37
PATIENT TEMP: 37
PATIENT TEMP: 38.2
PATIENT TEMP: 38.5
PATIENT TEMP: 40
PCO2, VEN: 60.6 MM HG (ref 41–51)
PCO2, VEN: 70.2 (ref 39–55)
PCO2, VEN: 85 (ref 39–55)
PDW BLD-RTO: 14.3 % (ref 11.8–14.4)
PDW BLD-RTO: 14.3 % (ref 11.8–14.4)
PDW BLD-RTO: 14.4 % (ref 11.8–14.4)
PDW BLD-RTO: 14.5 % (ref 11.8–14.4)
PDW BLD-RTO: 14.6 % (ref 11.8–14.4)
PDW BLD-RTO: 14.6 % (ref 11.8–14.4)
PDW BLD-RTO: 14.7 % (ref 11.8–14.4)
PDW BLD-RTO: 14.8 % (ref 11.8–14.4)
PDW BLD-RTO: 14.8 % (ref 11.8–14.4)
PDW BLD-RTO: 15 % (ref 11.8–14.4)
PDW BLD-RTO: 15.1 % (ref 11.8–14.4)
PDW BLD-RTO: 15.3 % (ref 11.8–14.4)
PDW BLD-RTO: 15.4 % (ref 11.8–14.4)
PDW BLD-RTO: 15.4 % (ref 11.8–14.4)
PDW BLD-RTO: 15.5 % (ref 11.8–14.4)
PDW BLD-RTO: 15.6 % (ref 11.8–14.4)
PDW BLD-RTO: 15.7 % (ref 11.8–14.4)
PDW BLD-RTO: 15.7 % (ref 11.8–14.4)
PDW BLD-RTO: 15.9 % (ref 11.8–14.4)
PDW BLD-RTO: 15.9 % (ref 11.8–14.4)
PDW BLD-RTO: 16 % (ref 11.8–14.4)
PDW BLD-RTO: 16.1 % (ref 11.8–14.4)
PDW BLD-RTO: 16.2 % (ref 11.8–14.4)
PDW BLD-RTO: 16.4 % (ref 11.8–14.4)
PDW BLD-RTO: 16.4 % (ref 11.8–14.4)
PH UA: 6 (ref 5–8)
PH UA: 7.5 (ref 5–8)
PH VENOUS: 7.26 (ref 7.32–7.42)
PH VENOUS: 7.32 (ref 7.32–7.42)
PH VENOUS: 7.36 (ref 7.32–7.43)
PLATELET # BLD: 179 K/UL (ref 138–453)
PLATELET # BLD: 215 K/UL (ref 138–453)
PLATELET # BLD: 219 K/UL (ref 138–453)
PLATELET # BLD: 227 K/UL (ref 138–453)
PLATELET # BLD: 229 K/UL (ref 138–453)
PLATELET # BLD: 230 K/UL (ref 138–453)
PLATELET # BLD: 234 K/UL (ref 138–453)
PLATELET # BLD: 236 K/UL (ref 138–453)
PLATELET # BLD: 240 K/UL (ref 138–453)
PLATELET # BLD: 243 K/UL (ref 138–453)
PLATELET # BLD: 246 K/UL (ref 138–453)
PLATELET # BLD: 247 K/UL (ref 138–453)
PLATELET # BLD: 247 K/UL (ref 138–453)
PLATELET # BLD: 251 K/UL (ref 138–453)
PLATELET # BLD: 254 K/UL (ref 138–453)
PLATELET # BLD: 257 K/UL (ref 138–453)
PLATELET # BLD: 259 K/UL (ref 138–453)
PLATELET # BLD: 261 K/UL (ref 138–453)
PLATELET # BLD: 261 K/UL (ref 138–453)
PLATELET # BLD: 262 K/UL (ref 138–453)
PLATELET # BLD: 270 K/UL (ref 138–453)
PLATELET # BLD: 271 K/UL (ref 138–453)
PLATELET # BLD: 271 K/UL (ref 138–453)
PLATELET # BLD: 273 K/UL (ref 138–453)
PLATELET # BLD: 275 K/UL (ref 138–453)
PLATELET # BLD: 276 K/UL (ref 138–453)
PLATELET # BLD: 291 K/UL (ref 138–453)
PLATELET # BLD: 298 K/UL (ref 138–453)
PLATELET # BLD: 352 K/UL (ref 138–453)
PLATELET # BLD: ABNORMAL K/UL (ref 138–453)
PLATELET, FLUORESCENCE: 289 K/UL (ref 138–453)
PLATELET, FLUORESCENCE: NORMAL K/UL (ref 138–453)
PLATELET, FLUORESCENCE: NORMAL K/UL (ref 138–453)
PLATELET, IMMATURE FRACTION: 6.1 % (ref 1.1–10.3)
PMV BLD AUTO: 10.2 FL (ref 8.1–13.5)
PMV BLD AUTO: 10.4 FL (ref 8.1–13.5)
PMV BLD AUTO: 10.5 FL (ref 8.1–13.5)
PMV BLD AUTO: 10.6 FL (ref 8.1–13.5)
PMV BLD AUTO: 10.6 FL (ref 8.1–13.5)
PMV BLD AUTO: 10.7 FL (ref 8.1–13.5)
PMV BLD AUTO: 10.8 FL (ref 8.1–13.5)
PMV BLD AUTO: 10.8 FL (ref 8.1–13.5)
PMV BLD AUTO: 10.9 FL (ref 8.1–13.5)
PMV BLD AUTO: 11 FL (ref 8.1–13.5)
PMV BLD AUTO: 11.1 FL (ref 8.1–13.5)
PMV BLD AUTO: 11.2 FL (ref 8.1–13.5)
PMV BLD AUTO: 11.3 FL (ref 8.1–13.5)
PMV BLD AUTO: 11.3 FL (ref 8.1–13.5)
PMV BLD AUTO: 11.4 FL (ref 8.1–13.5)
PMV BLD AUTO: 11.5 FL (ref 8.1–13.5)
PMV BLD AUTO: 12.1 FL (ref 8.1–13.5)
PMV BLD AUTO: 12.2 FL (ref 8.1–13.5)
PMV BLD AUTO: 12.9 FL (ref 8.1–13.5)
PO2, VEN: 34.8 (ref 30–50)
PO2, VEN: 34.8 MM HG (ref 30–50)
PO2, VEN: 40.6 (ref 30–50)
POC BUN: 16 MG/DL (ref 8–26)
POC BUN: 94 MG/DL (ref 8–26)
POC CHLORIDE: 100 MMOL/L (ref 98–107)
POC CHLORIDE: 103 MMOL/L (ref 98–107)
POC CREATININE: 0.89 MG/DL (ref 0.51–1.19)
POC CREATININE: 4.29 MG/DL (ref 0.51–1.19)
POC HCO3: 16 MMOL/L (ref 21–28)
POC HCO3: 27.8 MMOL/L (ref 21–28)
POC HCO3: 28.6 MMOL/L (ref 21–28)
POC HCO3: 28.8 MMOL/L (ref 21–28)
POC HCO3: 29.8 MMOL/L (ref 21–28)
POC HCO3: 30.3 MMOL/L (ref 21–28)
POC HCO3: 30.5 MMOL/L (ref 21–28)
POC HCO3: 34.4 MMOL/L (ref 21–28)
POC HCO3: 35.2 MMOL/L (ref 21–28)
POC HCO3: NORMAL MMOL/L (ref 21–28)
POC HEMATOCRIT: 41 % (ref 36–46)
POC HEMATOCRIT: 47 % (ref 36–46)
POC HEMOGLOBIN: 14 G/DL (ref 12–16)
POC HEMOGLOBIN: 16 G/DL (ref 12–16)
POC IONIZED CALCIUM: 1.06 MMOL/L (ref 1.15–1.33)
POC IONIZED CALCIUM: 1.12 MMOL/L (ref 1.15–1.33)
POC LACTIC ACID: 1.08 MMOL/L (ref 0.56–1.39)
POC LACTIC ACID: 12.78 MMOL/L (ref 0.56–1.39)
POC O2 SATURATION: 62 % (ref 94–98)
POC O2 SATURATION: 80 % (ref 94–98)
POC O2 SATURATION: 86 % (ref 94–98)
POC O2 SATURATION: 91 % (ref 94–98)
POC O2 SATURATION: 93 % (ref 94–98)
POC O2 SATURATION: 93 % (ref 94–98)
POC O2 SATURATION: 94 % (ref 94–98)
POC O2 SATURATION: 94 % (ref 94–98)
POC O2 SATURATION: 95 % (ref 94–98)
POC O2 SATURATION: NORMAL % (ref 94–98)
POC PCO2 TEMP: 52 MM HG
POC PCO2 TEMP: 56 MM HG
POC PCO2 TEMP: 84 MM HG
POC PCO2: 34.8 MM HG (ref 35–48)
POC PCO2: 43.5 MM HG (ref 35–48)
POC PCO2: 45.6 MM HG (ref 35–48)
POC PCO2: 45.8 MM HG (ref 35–48)
POC PCO2: 49.6 MM HG (ref 35–48)
POC PCO2: 51.3 MM HG (ref 35–48)
POC PCO2: 52.3 MM HG (ref 35–48)
POC PCO2: 56.2 MM HG (ref 35–48)
POC PCO2: 73.7 MM HG (ref 35–48)
POC PCO2: NORMAL MM HG (ref 35–48)
POC PH TEMP: 6.91
POC PH TEMP: 7.35
POC PH TEMP: 7.37
POC PH: 6.94 (ref 7.35–7.45)
POC PH: 7.32 (ref 7.35–7.45)
POC PH: 7.37 (ref 7.35–7.45)
POC PH: 7.38 (ref 7.35–7.45)
POC PH: 7.39 (ref 7.35–7.45)
POC PH: 7.39 (ref 7.35–7.45)
POC PH: 7.4 (ref 7.35–7.45)
POC PH: 7.52 (ref 7.35–7.45)
POC PH: 7.6 (ref 7.35–7.45)
POC PH: NORMAL (ref 7.35–7.45)
POC PO2 TEMP: 59 MM HG
POC PO2 TEMP: 64 MM HG
POC PO2 TEMP: 75 MM HG
POC PO2: 46 MM HG (ref 83–108)
POC PO2: 52.1 MM HG (ref 83–108)
POC PO2: 53.5 MM HG (ref 83–108)
POC PO2: 58.3 MM HG (ref 83–108)
POC PO2: 59.5 MM HG (ref 83–108)
POC PO2: 61.5 MM HG (ref 83–108)
POC PO2: 69.3 MM HG (ref 83–108)
POC PO2: 77.6 MM HG (ref 83–108)
POC PO2: 79.2 MM HG (ref 83–108)
POC PO2: NORMAL MM HG (ref 83–108)
POC POTASSIUM: 3.6 MMOL/L (ref 3.5–4.5)
POC POTASSIUM: 6.1 MMOL/L (ref 3.5–5.1)
POC SODIUM: 134 MMOL/L (ref 138–146)
POC SODIUM: 141 MMOL/L (ref 138–146)
POC TCO2: 17 MMOL/L (ref 22–30)
POC TCO2: 35 MMOL/L (ref 22–30)
POSITIVE BASE EXCESS, ART: 11 (ref 0–3)
POSITIVE BASE EXCESS, ART: 12 (ref 0–3)
POSITIVE BASE EXCESS, ART: 2 (ref 0–3)
POSITIVE BASE EXCESS, ART: 2 (ref 0–3)
POSITIVE BASE EXCESS, ART: 3 (ref 0–3)
POSITIVE BASE EXCESS, ART: 4 (ref 0–3)
POSITIVE BASE EXCESS, ART: NORMAL (ref 0–3)
POSITIVE BASE EXCESS, VEN: 7 (ref 0–3)
POSITIVE BASE EXCESS, VEN: 7.3 MMOL/L (ref 0–2)
POSITIVE BASE EXCESS, VEN: 8.1 MMOL/L (ref 0–2)
POTASSIUM SERPL-SCNC: 3 MMOL/L (ref 3.7–5.3)
POTASSIUM SERPL-SCNC: 3 MMOL/L (ref 3.7–5.3)
POTASSIUM SERPL-SCNC: 3.2 MMOL/L (ref 3.7–5.3)
POTASSIUM SERPL-SCNC: 3.3 MMOL/L (ref 3.7–5.3)
POTASSIUM SERPL-SCNC: 3.4 MMOL/L (ref 3.7–5.3)
POTASSIUM SERPL-SCNC: 3.5 MMOL/L (ref 3.7–5.3)
POTASSIUM SERPL-SCNC: 3.6 MMOL/L (ref 3.7–5.3)
POTASSIUM SERPL-SCNC: 3.7 MMOL/L (ref 3.7–5.3)
POTASSIUM SERPL-SCNC: 3.8 MMOL/L (ref 3.7–5.3)
POTASSIUM SERPL-SCNC: 3.8 MMOL/L (ref 3.7–5.3)
POTASSIUM SERPL-SCNC: 3.9 MMOL/L (ref 3.7–5.3)
POTASSIUM SERPL-SCNC: 4 MMOL/L (ref 3.7–5.3)
POTASSIUM SERPL-SCNC: 4.2 MMOL/L (ref 3.7–5.3)
POTASSIUM SERPL-SCNC: 4.2 MMOL/L (ref 3.7–5.3)
POTASSIUM SERPL-SCNC: 4.3 MMOL/L (ref 3.7–5.3)
POTASSIUM SERPL-SCNC: 4.3 MMOL/L (ref 3.7–5.3)
POTASSIUM SERPL-SCNC: 4.5 MMOL/L (ref 3.7–5.3)
POTASSIUM SERPL-SCNC: 4.6 MMOL/L (ref 3.7–5.3)
POTASSIUM SERPL-SCNC: 4.8 MMOL/L (ref 3.7–5.3)
POTASSIUM SERPL-SCNC: 6.2 MMOL/L (ref 3.7–5.3)
PRO-BNP: 1086 PG/ML
PRO-BNP: 4082 PG/ML
PRO-BNP: 4192 PG/ML
PROCALCITONIN: 0.15 NG/ML
PROCALCITONIN: 0.19 NG/ML
PROCALCITONIN: 14.67 NG/ML
PROCALCITONIN: 15.45 NG/ML
PROCALCITONIN: 2.45 NG/ML
PROTEIN UA: ABNORMAL
PROTEIN UA: NEGATIVE
RBC # BLD: 3.31 M/UL (ref 3.95–5.11)
RBC # BLD: 3.32 M/UL (ref 3.95–5.11)
RBC # BLD: 3.32 M/UL (ref 3.95–5.11)
RBC # BLD: 3.43 M/UL (ref 3.95–5.11)
RBC # BLD: 3.53 M/UL (ref 3.95–5.11)
RBC # BLD: 3.55 M/UL (ref 3.95–5.11)
RBC # BLD: 3.58 M/UL (ref 3.95–5.11)
RBC # BLD: 3.61 M/UL (ref 3.95–5.11)
RBC # BLD: 3.61 M/UL (ref 3.95–5.11)
RBC # BLD: 3.66 M/UL (ref 3.95–5.11)
RBC # BLD: 3.69 M/UL (ref 3.95–5.11)
RBC # BLD: 3.75 M/UL (ref 3.95–5.11)
RBC # BLD: 3.82 M/UL (ref 3.95–5.11)
RBC # BLD: 3.84 M/UL (ref 3.95–5.11)
RBC # BLD: 3.88 M/UL (ref 3.95–5.11)
RBC # BLD: 3.9 M/UL (ref 3.95–5.11)
RBC # BLD: 3.92 M/UL (ref 3.95–5.11)
RBC # BLD: 3.92 M/UL (ref 3.95–5.11)
RBC # BLD: 3.97 M/UL (ref 3.95–5.11)
RBC # BLD: 3.97 M/UL (ref 3.95–5.11)
RBC # BLD: 3.99 M/UL (ref 3.95–5.11)
RBC # BLD: 4 M/UL (ref 3.95–5.11)
RBC # BLD: 4.01 M/UL (ref 3.95–5.11)
RBC # BLD: 4.02 M/UL (ref 3.95–5.11)
RBC # BLD: 4.05 M/UL (ref 3.95–5.11)
RBC # BLD: 4.09 M/UL (ref 3.95–5.11)
RBC # BLD: 4.16 M/UL (ref 3.95–5.11)
RBC # BLD: 4.45 M/UL (ref 3.95–5.11)
RBC # BLD: 4.57 M/UL (ref 3.95–5.11)
RBC # BLD: 4.64 M/UL (ref 3.95–5.11)
RBC # BLD: ABNORMAL 10*6/UL
RBC UA: ABNORMAL /HPF (ref 0–2)
RBC UA: NORMAL /HPF (ref 0–4)
READ BACK: YES
REASON FOR REJECTION: NORMAL
REASON FOR REJECTION: NORMAL
RESP SYNCYTIAL VIRUS PCR: NOT DETECTED
RESP SYNCYTIAL VIRUS PCR: NOT DETECTED
RHINO/ENTEROVIRUS PCR: NOT DETECTED
RHINO/ENTEROVIRUS PCR: NOT DETECTED
SAMPLE SITE: ABNORMAL
SAMPLE SITE: NORMAL
SARS-COV-2, PCR: DETECTED
SARS-COV-2, PCR: NOT DETECTED
SARS-COV-2, RAPID: NOT DETECTED
SARS-COV-2, RAPID: NOT DETECTED
SARS-COV-2: NORMAL
SARS-COV-2: NOT DETECTED
SEG NEUTROPHILS: 65 % (ref 36–65)
SEG NEUTROPHILS: 66 % (ref 36–65)
SEG NEUTROPHILS: 67 % (ref 36–65)
SEG NEUTROPHILS: 68 % (ref 36–65)
SEG NEUTROPHILS: 69 % (ref 36–65)
SEG NEUTROPHILS: 70 % (ref 36–65)
SEG NEUTROPHILS: 71 % (ref 36–66)
SEG NEUTROPHILS: 73 % (ref 36–65)
SEG NEUTROPHILS: 73 % (ref 36–65)
SEG NEUTROPHILS: 73 % (ref 36–66)
SEG NEUTROPHILS: 74 % (ref 36–65)
SEG NEUTROPHILS: 74 % (ref 36–65)
SEG NEUTROPHILS: 76 % (ref 36–65)
SEG NEUTROPHILS: 78 % (ref 36–66)
SEG NEUTROPHILS: 79 % (ref 36–65)
SEG NEUTROPHILS: 79 % (ref 36–65)
SEG NEUTROPHILS: 81 % (ref 36–65)
SEG NEUTROPHILS: 82 % (ref 36–65)
SEG NEUTROPHILS: 82 % (ref 36–65)
SEG NEUTROPHILS: 83 % (ref 36–65)
SEG NEUTROPHILS: 83 % (ref 36–66)
SEG NEUTROPHILS: 84 % (ref 36–65)
SEG NEUTROPHILS: 85 % (ref 36–65)
SEG NEUTROPHILS: 86 % (ref 36–65)
SEG NEUTROPHILS: 86 % (ref 36–65)
SEG NEUTROPHILS: 87 % (ref 36–66)
SEG NEUTROPHILS: 87 % (ref 36–66)
SEG NEUTROPHILS: 90 % (ref 36–66)
SEG NEUTROPHILS: 90 % (ref 36–66)
SEG NEUTROPHILS: 93 % (ref 36–65)
SEGMENTED NEUTROPHILS ABSOLUTE COUNT: 10.09 K/UL (ref 1.5–8.1)
SEGMENTED NEUTROPHILS ABSOLUTE COUNT: 10.27 K/UL (ref 1.8–7.7)
SEGMENTED NEUTROPHILS ABSOLUTE COUNT: 11.61 K/UL (ref 1.8–7.7)
SEGMENTED NEUTROPHILS ABSOLUTE COUNT: 15.3 K/UL (ref 1.8–7.7)
SEGMENTED NEUTROPHILS ABSOLUTE COUNT: 19.18 K/UL (ref 1.5–8.1)
SEGMENTED NEUTROPHILS ABSOLUTE COUNT: 21.68 K/UL (ref 1.5–8.1)
SEGMENTED NEUTROPHILS ABSOLUTE COUNT: 22.7 K/UL (ref 1.8–7.7)
SEGMENTED NEUTROPHILS ABSOLUTE COUNT: 23.31 K/UL (ref 1.8–7.7)
SEGMENTED NEUTROPHILS ABSOLUTE COUNT: 5.68 K/UL (ref 1.8–7.7)
SEGMENTED NEUTROPHILS ABSOLUTE COUNT: 5.71 K/UL (ref 1.5–8.1)
SEGMENTED NEUTROPHILS ABSOLUTE COUNT: 5.78 K/UL (ref 1.5–8.1)
SEGMENTED NEUTROPHILS ABSOLUTE COUNT: 6.19 K/UL (ref 1.5–8.1)
SEGMENTED NEUTROPHILS ABSOLUTE COUNT: 6.19 K/UL (ref 1.8–7.7)
SEGMENTED NEUTROPHILS ABSOLUTE COUNT: 6.47 K/UL (ref 1.5–8.1)
SEGMENTED NEUTROPHILS ABSOLUTE COUNT: 6.55 K/UL (ref 1.5–8.1)
SEGMENTED NEUTROPHILS ABSOLUTE COUNT: 6.62 K/UL (ref 1.5–8.1)
SEGMENTED NEUTROPHILS ABSOLUTE COUNT: 7.18 K/UL (ref 1.5–8.1)
SEGMENTED NEUTROPHILS ABSOLUTE COUNT: 7.3 K/UL (ref 1.5–8.1)
SEGMENTED NEUTROPHILS ABSOLUTE COUNT: 7.31 K/UL (ref 1.5–8.1)
SEGMENTED NEUTROPHILS ABSOLUTE COUNT: 7.5 K/UL (ref 1.5–8.1)
SEGMENTED NEUTROPHILS ABSOLUTE COUNT: 7.52 K/UL (ref 1.5–8.1)
SEGMENTED NEUTROPHILS ABSOLUTE COUNT: 7.67 K/UL (ref 1.5–8.1)
SEGMENTED NEUTROPHILS ABSOLUTE COUNT: 7.91 K/UL (ref 1.5–8.1)
SEGMENTED NEUTROPHILS ABSOLUTE COUNT: 7.97 K/UL (ref 1.5–8.1)
SEGMENTED NEUTROPHILS ABSOLUTE COUNT: 8.03 K/UL (ref 1.5–8.1)
SEGMENTED NEUTROPHILS ABSOLUTE COUNT: 8.47 K/UL (ref 1.5–8.1)
SEGMENTED NEUTROPHILS ABSOLUTE COUNT: 8.56 K/UL (ref 1.5–8.1)
SEGMENTED NEUTROPHILS ABSOLUTE COUNT: 8.85 K/UL (ref 1.5–8.1)
SEGMENTED NEUTROPHILS ABSOLUTE COUNT: 8.93 K/UL (ref 1.5–8.1)
SEGMENTED NEUTROPHILS ABSOLUTE COUNT: 8.96 K/UL (ref 1.8–7.7)
SEGMENTED NEUTROPHILS ABSOLUTE COUNT: 9.36 K/UL (ref 1.5–8.1)
SEGMENTED NEUTROPHILS ABSOLUTE COUNT: 9.45 K/UL (ref 1.5–8.1)
SODIUM BLD-SCNC: 132 MMOL/L (ref 135–144)
SODIUM BLD-SCNC: 133 MMOL/L (ref 135–144)
SODIUM BLD-SCNC: 133 MMOL/L (ref 135–144)
SODIUM BLD-SCNC: 134 MMOL/L (ref 135–144)
SODIUM BLD-SCNC: 135 MMOL/L (ref 135–144)
SODIUM BLD-SCNC: 136 MMOL/L (ref 135–144)
SODIUM BLD-SCNC: 137 MMOL/L (ref 135–144)
SODIUM BLD-SCNC: 138 MMOL/L (ref 135–144)
SODIUM BLD-SCNC: 139 MMOL/L (ref 135–144)
SODIUM BLD-SCNC: 140 MMOL/L (ref 135–144)
SODIUM BLD-SCNC: 140 MMOL/L (ref 135–144)
SODIUM BLD-SCNC: 141 MMOL/L (ref 135–144)
SOURCE: NORMAL
SPECIFIC GRAVITY UA: 1.01 (ref 1–1.03)
SPECIFIC GRAVITY UA: 1.02 (ref 1–1.03)
SPECIMEN DESCRIPTION: ABNORMAL
SPECIMEN DESCRIPTION: NORMAL
TOTAL IRON BINDING CAPACITY: 239 UG/DL (ref 250–450)
TOTAL PROTEIN: 6.5 G/DL (ref 6.4–8.3)
TOTAL PROTEIN: 6.7 G/DL (ref 6.4–8.3)
TOTAL PROTEIN: 7 G/DL (ref 6.4–8.3)
TOTAL PROTEIN: 7.1 G/DL (ref 6.4–8.3)
TRIGL SERPL-MCNC: 128 MG/DL
TROPONIN, HIGH SENSITIVITY: 10 NG/L (ref 0–14)
TROPONIN, HIGH SENSITIVITY: 12 NG/L (ref 0–14)
TROPONIN, HIGH SENSITIVITY: 13 NG/L (ref 0–14)
TROPONIN, HIGH SENSITIVITY: 13 NG/L (ref 0–14)
TROPONIN, HIGH SENSITIVITY: 15 NG/L (ref 0–14)
TROPONIN, HIGH SENSITIVITY: 20 NG/L (ref 0–14)
TROPONIN, HIGH SENSITIVITY: 20 NG/L (ref 0–14)
TROPONIN, HIGH SENSITIVITY: 8 NG/L (ref 0–14)
TURBIDITY: ABNORMAL
TURBIDITY: CLEAR
UNSATURATED IRON BINDING CAPACITY: 203 UG/DL (ref 112–347)
URINE HGB: ABNORMAL
URINE HGB: NEGATIVE
UROBILINOGEN, URINE: NORMAL
UROBILINOGEN, URINE: NORMAL
WBC # BLD: 10 K/UL (ref 3.5–11.3)
WBC # BLD: 10.1 K/UL (ref 3.5–11.3)
WBC # BLD: 10.1 K/UL (ref 3.5–11.3)
WBC # BLD: 10.5 K/UL (ref 3.5–11.3)
WBC # BLD: 10.7 K/UL (ref 3.5–11.3)
WBC # BLD: 10.9 K/UL (ref 3.5–11.3)
WBC # BLD: 11.1 K/UL (ref 3.5–11.3)
WBC # BLD: 11.5 K/UL (ref 3.5–11.3)
WBC # BLD: 11.5 K/UL (ref 3.5–11.3)
WBC # BLD: 11.6 K/UL (ref 3.5–11.3)
WBC # BLD: 11.8 K/UL (ref 3.5–11.3)
WBC # BLD: 11.9 K/UL (ref 3.5–11.3)
WBC # BLD: 12.2 K/UL (ref 3.5–11.3)
WBC # BLD: 12.9 K/UL (ref 3.5–11.3)
WBC # BLD: 13.6 K/UL (ref 3.5–11.3)
WBC # BLD: 17 K/UL (ref 3.5–11.3)
WBC # BLD: 22.2 K/UL (ref 3.5–11.3)
WBC # BLD: 25.2 K/UL (ref 3.5–11.3)
WBC # BLD: 26.1 K/UL (ref 3.5–11.3)
WBC # BLD: 28.1 K/UL (ref 3.5–11.3)
WBC # BLD: 7 K/UL (ref 3.5–11.3)
WBC # BLD: 8 K/UL (ref 3.5–11.3)
WBC # BLD: 8.5 K/UL (ref 3.5–11.3)
WBC # BLD: 8.5 K/UL (ref 3.5–11.3)
WBC # BLD: 8.7 K/UL (ref 3.5–11.3)
WBC # BLD: 9.1 K/UL (ref 3.5–11.3)
WBC # BLD: 9.2 K/UL (ref 3.5–11.3)
WBC # BLD: 9.2 K/UL (ref 3.5–11.3)
WBC # BLD: 9.6 K/UL (ref 3.5–11.3)
WBC # BLD: 9.7 K/UL (ref 3.5–11.3)
WBC UA: ABNORMAL /HPF (ref 0–5)
WBC UA: NORMAL /HPF (ref 0–5)
YEAST: ABNORMAL
ZZ NTE CLEAN UP: ORDERED TEST: NORMAL
ZZ NTE CLEAN UP: ORDERED TEST: NORMAL
ZZ NTE WITH NAME CLEAN UP: SPECIMEN SOURCE: NORMAL
ZZ NTE WITH NAME CLEAN UP: SPECIMEN SOURCE: NORMAL

## 2022-01-01 PROCEDURE — 94761 N-INVAS EAR/PLS OXIMETRY MLT: CPT

## 2022-01-01 PROCEDURE — 97116 GAIT TRAINING THERAPY: CPT

## 2022-01-01 PROCEDURE — 6360000002 HC RX W HCPCS: Performed by: STUDENT IN AN ORGANIZED HEALTH CARE EDUCATION/TRAINING PROGRAM

## 2022-01-01 PROCEDURE — 85025 COMPLETE CBC W/AUTO DIFF WBC: CPT

## 2022-01-01 PROCEDURE — 94660 CPAP INITIATION&MGMT: CPT

## 2022-01-01 PROCEDURE — 99233 SBSQ HOSP IP/OBS HIGH 50: CPT | Performed by: INTERNAL MEDICINE

## 2022-01-01 PROCEDURE — 36415 COLL VENOUS BLD VENIPUNCTURE: CPT

## 2022-01-01 PROCEDURE — 2580000003 HC RX 258

## 2022-01-01 PROCEDURE — 6370000000 HC RX 637 (ALT 250 FOR IP)

## 2022-01-01 PROCEDURE — 6370000000 HC RX 637 (ALT 250 FOR IP): Performed by: STUDENT IN AN ORGANIZED HEALTH CARE EDUCATION/TRAINING PROGRAM

## 2022-01-01 PROCEDURE — 82805 BLOOD GASES W/O2 SATURATION: CPT

## 2022-01-01 PROCEDURE — 2580000003 HC RX 258: Performed by: STUDENT IN AN ORGANIZED HEALTH CARE EDUCATION/TRAINING PROGRAM

## 2022-01-01 PROCEDURE — 82947 ASSAY GLUCOSE BLOOD QUANT: CPT

## 2022-01-01 PROCEDURE — 99223 1ST HOSP IP/OBS HIGH 75: CPT | Performed by: INTERNAL MEDICINE

## 2022-01-01 PROCEDURE — 94002 VENT MGMT INPAT INIT DAY: CPT

## 2022-01-01 PROCEDURE — 96375 TX/PRO/DX INJ NEW DRUG ADDON: CPT

## 2022-01-01 PROCEDURE — 2700000000 HC OXYGEN THERAPY PER DAY

## 2022-01-01 PROCEDURE — 84484 ASSAY OF TROPONIN QUANT: CPT

## 2022-01-01 PROCEDURE — 97110 THERAPEUTIC EXERCISES: CPT

## 2022-01-01 PROCEDURE — 94640 AIRWAY INHALATION TREATMENT: CPT

## 2022-01-01 PROCEDURE — 6360000002 HC RX W HCPCS

## 2022-01-01 PROCEDURE — 97162 PT EVAL MOD COMPLEX 30 MIN: CPT

## 2022-01-01 PROCEDURE — 2500000003 HC RX 250 WO HCPCS

## 2022-01-01 PROCEDURE — 97530 THERAPEUTIC ACTIVITIES: CPT

## 2022-01-01 PROCEDURE — 83735 ASSAY OF MAGNESIUM: CPT

## 2022-01-01 PROCEDURE — A4216 STERILE WATER/SALINE, 10 ML: HCPCS

## 2022-01-01 PROCEDURE — 86140 C-REACTIVE PROTEIN: CPT

## 2022-01-01 PROCEDURE — 99291 CRITICAL CARE FIRST HOUR: CPT | Performed by: INTERNAL MEDICINE

## 2022-01-01 PROCEDURE — 2500000003 HC RX 250 WO HCPCS: Performed by: STUDENT IN AN ORGANIZED HEALTH CARE EDUCATION/TRAINING PROGRAM

## 2022-01-01 PROCEDURE — 2000000000 HC ICU R&B

## 2022-01-01 PROCEDURE — 6360000002 HC RX W HCPCS: Performed by: INTERNAL MEDICINE

## 2022-01-01 PROCEDURE — 93010 ELECTROCARDIOGRAM REPORT: CPT | Performed by: INTERNAL MEDICINE

## 2022-01-01 PROCEDURE — 80048 BASIC METABOLIC PNL TOTAL CA: CPT

## 2022-01-01 PROCEDURE — 99239 HOSP IP/OBS DSCHRG MGMT >30: CPT | Performed by: INTERNAL MEDICINE

## 2022-01-01 PROCEDURE — 82565 ASSAY OF CREATININE: CPT

## 2022-01-01 PROCEDURE — 94681 O2 UPTK CO2 OUTP % O2 XTRC: CPT

## 2022-01-01 PROCEDURE — 87635 SARS-COV-2 COVID-19 AMP PRB: CPT

## 2022-01-01 PROCEDURE — 1200000000 HC SEMI PRIVATE

## 2022-01-01 PROCEDURE — 82803 BLOOD GASES ANY COMBINATION: CPT

## 2022-01-01 PROCEDURE — 6370000000 HC RX 637 (ALT 250 FOR IP): Performed by: INTERNAL MEDICINE

## 2022-01-01 PROCEDURE — 99232 SBSQ HOSP IP/OBS MODERATE 35: CPT | Performed by: INTERNAL MEDICINE

## 2022-01-01 PROCEDURE — 83605 ASSAY OF LACTIC ACID: CPT

## 2022-01-01 PROCEDURE — 99285 EMERGENCY DEPT VISIT HI MDM: CPT

## 2022-01-01 PROCEDURE — 84520 ASSAY OF UREA NITROGEN: CPT

## 2022-01-01 PROCEDURE — U0005 INFEC AGEN DETEC AMPLI PROBE: HCPCS

## 2022-01-01 PROCEDURE — 85379 FIBRIN DEGRADATION QUANT: CPT

## 2022-01-01 PROCEDURE — 0202U NFCT DS 22 TRGT SARS-COV-2: CPT

## 2022-01-01 PROCEDURE — 97535 SELF CARE MNGMENT TRAINING: CPT

## 2022-01-01 PROCEDURE — 92611 MOTION FLUOROSCOPY/SWALLOW: CPT

## 2022-01-01 PROCEDURE — 87186 SC STD MICRODIL/AGAR DIL: CPT

## 2022-01-01 PROCEDURE — 83550 IRON BINDING TEST: CPT

## 2022-01-01 PROCEDURE — 2500000003 HC RX 250 WO HCPCS: Performed by: INTERNAL MEDICINE

## 2022-01-01 PROCEDURE — 99238 HOSP IP/OBS DSCHRG MGMT 30/<: CPT | Performed by: INTERNAL MEDICINE

## 2022-01-01 PROCEDURE — 94003 VENT MGMT INPAT SUBQ DAY: CPT

## 2022-01-01 PROCEDURE — 93306 TTE W/DOPPLER COMPLETE: CPT

## 2022-01-01 PROCEDURE — 2580000003 HC RX 258: Performed by: INTERNAL MEDICINE

## 2022-01-01 PROCEDURE — 86403 PARTICLE AGGLUT ANTBDY SCRN: CPT

## 2022-01-01 PROCEDURE — 99212 OFFICE O/P EST SF 10 MIN: CPT

## 2022-01-01 PROCEDURE — 87040 BLOOD CULTURE FOR BACTERIA: CPT

## 2022-01-01 PROCEDURE — 51702 INSERT TEMP BLADDER CATH: CPT

## 2022-01-01 PROCEDURE — 6360000004 HC RX CONTRAST MEDICATION: Performed by: STUDENT IN AN ORGANIZED HEALTH CARE EDUCATION/TRAINING PROGRAM

## 2022-01-01 PROCEDURE — 81001 URINALYSIS AUTO W/SCOPE: CPT

## 2022-01-01 PROCEDURE — 76937 US GUIDE VASCULAR ACCESS: CPT

## 2022-01-01 PROCEDURE — 94150 VITAL CAPACITY TEST: CPT

## 2022-01-01 PROCEDURE — 71045 X-RAY EXAM CHEST 1 VIEW: CPT

## 2022-01-01 PROCEDURE — 84145 PROCALCITONIN (PCT): CPT

## 2022-01-01 PROCEDURE — 80076 HEPATIC FUNCTION PANEL: CPT

## 2022-01-01 PROCEDURE — 93005 ELECTROCARDIOGRAM TRACING: CPT | Performed by: STUDENT IN AN ORGANIZED HEALTH CARE EDUCATION/TRAINING PROGRAM

## 2022-01-01 PROCEDURE — 94664 DEMO&/EVAL PT USE INHALER: CPT

## 2022-01-01 PROCEDURE — 86738 MYCOPLASMA ANTIBODY: CPT

## 2022-01-01 PROCEDURE — 82728 ASSAY OF FERRITIN: CPT

## 2022-01-01 PROCEDURE — 83880 ASSAY OF NATRIURETIC PEPTIDE: CPT

## 2022-01-01 PROCEDURE — 85055 RETICULATED PLATELET ASSAY: CPT

## 2022-01-01 PROCEDURE — 87804 INFLUENZA ASSAY W/OPTIC: CPT

## 2022-01-01 PROCEDURE — 96372 THER/PROPH/DIAG INJ SC/IM: CPT

## 2022-01-01 PROCEDURE — 83050 HGB METHEMOGLOBIN QUAN: CPT

## 2022-01-01 PROCEDURE — 82330 ASSAY OF CALCIUM: CPT

## 2022-01-01 PROCEDURE — 87449 NOS EACH ORGANISM AG IA: CPT

## 2022-01-01 PROCEDURE — 97166 OT EVAL MOD COMPLEX 45 MIN: CPT

## 2022-01-01 PROCEDURE — 87147 CULTURE TYPE IMMUNOLOGIC: CPT

## 2022-01-01 PROCEDURE — 93970 EXTREMITY STUDY: CPT

## 2022-01-01 PROCEDURE — 94760 N-INVAS EAR/PLS OXIMETRY 1: CPT

## 2022-01-01 PROCEDURE — 84478 ASSAY OF TRIGLYCERIDES: CPT

## 2022-01-01 PROCEDURE — U0003 INFECTIOUS AGENT DETECTION BY NUCLEIC ACID (DNA OR RNA); SEVERE ACUTE RESPIRATORY SYNDROME CORONAVIRUS 2 (SARS-COV-2) (CORONAVIRUS DISEASE [COVID-19]), AMPLIFIED PROBE TECHNIQUE, MAKING USE OF HIGH THROUGHPUT TECHNOLOGIES AS DESCRIBED BY CMS-2020-01-R: HCPCS

## 2022-01-01 PROCEDURE — 74018 RADEX ABDOMEN 1 VIEW: CPT

## 2022-01-01 PROCEDURE — 36620 INSERTION CATHETER ARTERY: CPT

## 2022-01-01 PROCEDURE — 96365 THER/PROPH/DIAG IV INF INIT: CPT

## 2022-01-01 PROCEDURE — 36620 INSERTION CATHETER ARTERY: CPT | Performed by: INTERNAL MEDICINE

## 2022-01-01 PROCEDURE — XW033E5 INTRODUCTION OF REMDESIVIR ANTI-INFECTIVE INTO PERIPHERAL VEIN, PERCUTANEOUS APPROACH, NEW TECHNOLOGY GROUP 5: ICD-10-PCS | Performed by: INTERNAL MEDICINE

## 2022-01-01 PROCEDURE — 84703 CHORIONIC GONADOTROPIN ASSAY: CPT

## 2022-01-01 PROCEDURE — 87070 CULTURE OTHR SPECIMN AEROBIC: CPT

## 2022-01-01 PROCEDURE — 80051 ELECTROLYTE PANEL: CPT

## 2022-01-01 PROCEDURE — 3E0333Z INTRODUCTION OF ANTI-INFLAMMATORY INTO PERIPHERAL VEIN, PERCUTANEOUS APPROACH: ICD-10-PCS | Performed by: STUDENT IN AN ORGANIZED HEALTH CARE EDUCATION/TRAINING PROGRAM

## 2022-01-01 PROCEDURE — 83036 HEMOGLOBIN GLYCOSYLATED A1C: CPT

## 2022-01-01 PROCEDURE — 0BH18EZ INSERTION OF ENDOTRACHEAL AIRWAY INTO TRACHEA, VIA NATURAL OR ARTIFICIAL OPENING ENDOSCOPIC: ICD-10-PCS | Performed by: STUDENT IN AN ORGANIZED HEALTH CARE EDUCATION/TRAINING PROGRAM

## 2022-01-01 PROCEDURE — 71260 CT THORAX DX C+: CPT

## 2022-01-01 PROCEDURE — 99222 1ST HOSP IP/OBS MODERATE 55: CPT | Performed by: FAMILY MEDICINE

## 2022-01-01 PROCEDURE — 99232 SBSQ HOSP IP/OBS MODERATE 35: CPT | Performed by: NURSE PRACTITIONER

## 2022-01-01 PROCEDURE — 80053 COMPREHEN METABOLIC PANEL: CPT

## 2022-01-01 PROCEDURE — 87641 MR-STAPH DNA AMP PROBE: CPT

## 2022-01-01 PROCEDURE — 31500 INSERT EMERGENCY AIRWAY: CPT | Performed by: INTERNAL MEDICINE

## 2022-01-01 PROCEDURE — 84132 ASSAY OF SERUM POTASSIUM: CPT

## 2022-01-01 PROCEDURE — 36600 WITHDRAWAL OF ARTERIAL BLOOD: CPT

## 2022-01-01 PROCEDURE — 37799 UNLISTED PX VASCULAR SURGERY: CPT

## 2022-01-01 PROCEDURE — 85384 FIBRINOGEN ACTIVITY: CPT

## 2022-01-01 PROCEDURE — 85014 HEMATOCRIT: CPT

## 2022-01-01 PROCEDURE — 87205 SMEAR GRAM STAIN: CPT

## 2022-01-01 PROCEDURE — 74230 X-RAY XM SWLNG FUNCJ C+: CPT

## 2022-01-01 PROCEDURE — 99222 1ST HOSP IP/OBS MODERATE 55: CPT | Performed by: NURSE PRACTITIONER

## 2022-01-01 PROCEDURE — 83540 ASSAY OF IRON: CPT

## 2022-01-01 PROCEDURE — 5A1955Z RESPIRATORY VENTILATION, GREATER THAN 96 CONSECUTIVE HOURS: ICD-10-PCS | Performed by: STUDENT IN AN ORGANIZED HEALTH CARE EDUCATION/TRAINING PROGRAM

## 2022-01-01 RX ORDER — NOREPINEPHRINE BIT/0.9 % NACL 16MG/250ML
INFUSION BOTTLE (ML) INTRAVENOUS
Status: COMPLETED
Start: 2022-01-01 | End: 2022-01-01

## 2022-01-01 RX ORDER — INSULIN LISPRO 100 [IU]/ML
0-6 INJECTION, SOLUTION INTRAVENOUS; SUBCUTANEOUS NIGHTLY
Status: DISCONTINUED | OUTPATIENT
Start: 2022-01-01 | End: 2022-01-01

## 2022-01-01 RX ORDER — 0.9 % SODIUM CHLORIDE 0.9 %
30 INTRAVENOUS SOLUTION INTRAVENOUS PRN
Status: DISCONTINUED | OUTPATIENT
Start: 2022-01-01 | End: 2022-01-01 | Stop reason: HOSPADM

## 2022-01-01 RX ORDER — MINERAL OIL AND WHITE PETROLATUM 150; 830 MG/G; MG/G
OINTMENT OPHTHALMIC PRN
Status: DISCONTINUED | OUTPATIENT
Start: 2022-01-01 | End: 2022-01-01 | Stop reason: HOSPADM

## 2022-01-01 RX ORDER — INSULIN GLARGINE 100 [IU]/ML
20 INJECTION, SOLUTION SUBCUTANEOUS NIGHTLY
Status: DISCONTINUED | OUTPATIENT
Start: 2022-01-01 | End: 2022-01-01

## 2022-01-01 RX ORDER — INSULIN GLARGINE 100 [IU]/ML
10 INJECTION, SOLUTION SUBCUTANEOUS NIGHTLY
Status: DISCONTINUED | OUTPATIENT
Start: 2022-01-01 | End: 2022-01-01

## 2022-01-01 RX ORDER — POLYETHYLENE GLYCOL 3350 17 G/17G
17 POWDER, FOR SOLUTION ORAL PRN
Status: DISCONTINUED | OUTPATIENT
Start: 2022-01-01 | End: 2022-01-01 | Stop reason: HOSPADM

## 2022-01-01 RX ORDER — FOLIC ACID 1 MG/1
1 TABLET ORAL DAILY
Status: DISCONTINUED | OUTPATIENT
Start: 2022-01-01 | End: 2022-01-01 | Stop reason: HOSPADM

## 2022-01-01 RX ORDER — NICOTINE POLACRILEX 4 MG
15 LOZENGE BUCCAL PRN
Status: DISCONTINUED | OUTPATIENT
Start: 2022-01-01 | End: 2022-01-01 | Stop reason: HOSPADM

## 2022-01-01 RX ORDER — AMLODIPINE BESYLATE 5 MG/1
5 TABLET ORAL DAILY
Status: DISCONTINUED | OUTPATIENT
Start: 2022-01-01 | End: 2022-01-01 | Stop reason: HOSPADM

## 2022-01-01 RX ORDER — OXYCODONE AND ACETAMINOPHEN 10; 325 MG/1; MG/1
1 TABLET ORAL EVERY 6 HOURS PRN
Qty: 16 TABLET | Refills: 0 | Status: SHIPPED | OUTPATIENT
Start: 2022-01-01 | End: 2022-01-01

## 2022-01-01 RX ORDER — ETOMIDATE 2 MG/ML
INJECTION INTRAVENOUS
Status: DISPENSED
Start: 2022-01-01 | End: 2022-01-01

## 2022-01-01 RX ORDER — BUDESONIDE AND FORMOTEROL FUMARATE DIHYDRATE 160; 4.5 UG/1; UG/1
2 AEROSOL RESPIRATORY (INHALATION) 2 TIMES DAILY
Status: DISCONTINUED | OUTPATIENT
Start: 2022-01-01 | End: 2022-01-01 | Stop reason: HOSPADM

## 2022-01-01 RX ORDER — FLUTICASONE FUROATE AND VILANTEROL TRIFENATATE 100; 25 UG/1; UG/1
2 POWDER RESPIRATORY (INHALATION) DAILY
COMMUNITY

## 2022-01-01 RX ORDER — PROPOFOL 10 MG/ML
5-50 INJECTION, EMULSION INTRAVENOUS CONTINUOUS
Status: DISCONTINUED | OUTPATIENT
Start: 2022-01-01 | End: 2022-01-01

## 2022-01-01 RX ORDER — HYDRALAZINE HYDROCHLORIDE 25 MG/1
25 TABLET, FILM COATED ORAL EVERY 8 HOURS SCHEDULED
Status: DISCONTINUED | OUTPATIENT
Start: 2022-01-01 | End: 2022-01-01

## 2022-01-01 RX ORDER — INSULIN LISPRO 100 [IU]/ML
0-18 INJECTION, SOLUTION INTRAVENOUS; SUBCUTANEOUS EVERY 4 HOURS
Status: DISCONTINUED | OUTPATIENT
Start: 2022-01-01 | End: 2022-01-01 | Stop reason: HOSPADM

## 2022-01-01 RX ORDER — FUROSEMIDE 10 MG/ML
40 INJECTION INTRAMUSCULAR; INTRAVENOUS ONCE
Status: COMPLETED | OUTPATIENT
Start: 2022-01-01 | End: 2022-01-01

## 2022-01-01 RX ORDER — ACETYLCYSTEINE 200 MG/ML
600 SOLUTION ORAL; RESPIRATORY (INHALATION) 2 TIMES DAILY
Status: DISCONTINUED | OUTPATIENT
Start: 2022-01-01 | End: 2022-01-01

## 2022-01-01 RX ORDER — ACETAMINOPHEN 650 MG/1
650 SUPPOSITORY RECTAL EVERY 6 HOURS PRN
Status: DISCONTINUED | OUTPATIENT
Start: 2022-01-01 | End: 2022-01-01 | Stop reason: HOSPADM

## 2022-01-01 RX ORDER — SODIUM CHLORIDE 0.9 % (FLUSH) 0.9 %
5-40 SYRINGE (ML) INJECTION PRN
Status: DISCONTINUED | OUTPATIENT
Start: 2022-01-01 | End: 2022-01-01 | Stop reason: HOSPADM

## 2022-01-01 RX ORDER — BUMETANIDE 0.25 MG/ML
2 INJECTION, SOLUTION INTRAMUSCULAR; INTRAVENOUS ONCE
Status: DISCONTINUED | OUTPATIENT
Start: 2022-01-01 | End: 2022-01-01

## 2022-01-01 RX ORDER — CEFTRIAXONE 1 G/1
INJECTION, POWDER, FOR SOLUTION INTRAMUSCULAR; INTRAVENOUS
Status: DISPENSED
Start: 2022-01-01 | End: 2022-01-01

## 2022-01-01 RX ORDER — LORAZEPAM 2 MG/ML
1 INJECTION INTRAMUSCULAR EVERY 4 HOURS PRN
Status: DISCONTINUED | OUTPATIENT
Start: 2022-01-01 | End: 2022-01-01 | Stop reason: HOSPADM

## 2022-01-01 RX ORDER — ENOXAPARIN SODIUM 100 MG/ML
30 INJECTION SUBCUTANEOUS 2 TIMES DAILY
Status: DISCONTINUED | OUTPATIENT
Start: 2022-01-01 | End: 2022-01-01

## 2022-01-01 RX ORDER — BUMETANIDE 0.25 MG/ML
2 INJECTION, SOLUTION INTRAMUSCULAR; INTRAVENOUS 2 TIMES DAILY
Status: DISCONTINUED | OUTPATIENT
Start: 2022-01-01 | End: 2022-01-01

## 2022-01-01 RX ORDER — ONDANSETRON 2 MG/ML
4 INJECTION INTRAMUSCULAR; INTRAVENOUS EVERY 6 HOURS PRN
Status: DISCONTINUED | OUTPATIENT
Start: 2022-01-01 | End: 2022-01-01 | Stop reason: HOSPADM

## 2022-01-01 RX ORDER — INSULIN GLARGINE 100 [IU]/ML
25 INJECTION, SOLUTION SUBCUTANEOUS EVERY MORNING
Status: DISCONTINUED | OUTPATIENT
Start: 2022-01-01 | End: 2022-01-01

## 2022-01-01 RX ORDER — FUROSEMIDE 10 MG/ML
60 INJECTION INTRAMUSCULAR; INTRAVENOUS 2 TIMES DAILY
Status: DISCONTINUED | OUTPATIENT
Start: 2022-01-01 | End: 2022-01-01

## 2022-01-01 RX ORDER — LIDOCAINE 4 G/G
1 PATCH TOPICAL DAILY
Status: DISCONTINUED | OUTPATIENT
Start: 2022-01-01 | End: 2022-01-01 | Stop reason: HOSPADM

## 2022-01-01 RX ORDER — ONDANSETRON 4 MG/1
4 TABLET, ORALLY DISINTEGRATING ORAL EVERY 8 HOURS PRN
Status: DISCONTINUED | OUTPATIENT
Start: 2022-01-01 | End: 2022-01-01 | Stop reason: HOSPADM

## 2022-01-01 RX ORDER — ALBUTEROL SULFATE 2.5 MG/3ML
2.5 SOLUTION RESPIRATORY (INHALATION) 4 TIMES DAILY
Status: DISCONTINUED | OUTPATIENT
Start: 2022-01-01 | End: 2022-01-01 | Stop reason: HOSPADM

## 2022-01-01 RX ORDER — FLUTICASONE PROPIONATE 50 MCG
1 SPRAY, SUSPENSION (ML) NASAL DAILY
Status: DISCONTINUED | OUTPATIENT
Start: 2022-01-01 | End: 2022-01-01 | Stop reason: HOSPADM

## 2022-01-01 RX ORDER — MORPHINE SULFATE 2 MG/ML
1 INJECTION, SOLUTION INTRAMUSCULAR; INTRAVENOUS EVERY 4 HOURS PRN
Status: DISCONTINUED | OUTPATIENT
Start: 2022-01-01 | End: 2022-01-01

## 2022-01-01 RX ORDER — CALCIUM CARBONATE 200(500)MG
500 TABLET,CHEWABLE ORAL 3 TIMES DAILY PRN
Status: DISCONTINUED | OUTPATIENT
Start: 2022-01-01 | End: 2022-01-01 | Stop reason: HOSPADM

## 2022-01-01 RX ORDER — 0.9 % SODIUM CHLORIDE 0.9 %
500 INTRAVENOUS SOLUTION INTRAVENOUS ONCE
Status: COMPLETED | OUTPATIENT
Start: 2022-01-01 | End: 2022-01-01

## 2022-01-01 RX ORDER — LACTOBACILLUS RHAMNOSUS GG 10B CELL
1 CAPSULE ORAL
Status: DISCONTINUED | OUTPATIENT
Start: 2022-01-01 | End: 2022-01-01 | Stop reason: HOSPADM

## 2022-01-01 RX ORDER — OXYCODONE HYDROCHLORIDE AND ACETAMINOPHEN 5; 325 MG/1; MG/1
1 TABLET ORAL EVERY 4 HOURS PRN
Status: DISCONTINUED | OUTPATIENT
Start: 2022-01-01 | End: 2022-01-01 | Stop reason: HOSPADM

## 2022-01-01 RX ORDER — SODIUM CHLORIDE 0.9 % (FLUSH) 0.9 %
5-40 SYRINGE (ML) INJECTION EVERY 12 HOURS SCHEDULED
Status: DISCONTINUED | OUTPATIENT
Start: 2022-01-01 | End: 2022-01-01 | Stop reason: HOSPADM

## 2022-01-01 RX ORDER — MAGNESIUM SULFATE IN WATER 40 MG/ML
2000 INJECTION, SOLUTION INTRAVENOUS ONCE
Status: DISCONTINUED | OUTPATIENT
Start: 2022-01-01 | End: 2022-01-01

## 2022-01-01 RX ORDER — LEVOFLOXACIN 5 MG/ML
500 INJECTION, SOLUTION INTRAVENOUS EVERY 24 HOURS
Status: DISCONTINUED | OUTPATIENT
Start: 2022-01-01 | End: 2022-01-01

## 2022-01-01 RX ORDER — ALBUTEROL SULFATE 90 UG/1
2 AEROSOL, METERED RESPIRATORY (INHALATION) EVERY 6 HOURS PRN
Status: DISCONTINUED | OUTPATIENT
Start: 2022-01-01 | End: 2022-01-01 | Stop reason: HOSPADM

## 2022-01-01 RX ORDER — FAMOTIDINE 20 MG/1
20 TABLET, FILM COATED ORAL 2 TIMES DAILY
Status: DISCONTINUED | OUTPATIENT
Start: 2022-01-01 | End: 2022-01-01

## 2022-01-01 RX ORDER — CETIRIZINE HYDROCHLORIDE 10 MG/1
10 TABLET ORAL DAILY
Status: DISCONTINUED | OUTPATIENT
Start: 2022-01-01 | End: 2022-01-01 | Stop reason: HOSPADM

## 2022-01-01 RX ORDER — IPRATROPIUM BROMIDE AND ALBUTEROL SULFATE 2.5; .5 MG/3ML; MG/3ML
1 SOLUTION RESPIRATORY (INHALATION) 4 TIMES DAILY
Status: DISCONTINUED | OUTPATIENT
Start: 2022-01-01 | End: 2022-01-01

## 2022-01-01 RX ORDER — ISOSORBIDE DINITRATE 10 MG/1
20 TABLET ORAL 3 TIMES DAILY
Status: DISCONTINUED | OUTPATIENT
Start: 2022-01-01 | End: 2022-01-01 | Stop reason: HOSPADM

## 2022-01-01 RX ORDER — SODIUM CHLORIDE 0.9 % (FLUSH) 0.9 %
10 SYRINGE (ML) INJECTION PRN
Status: DISCONTINUED | OUTPATIENT
Start: 2022-01-01 | End: 2022-01-01 | Stop reason: HOSPADM

## 2022-01-01 RX ORDER — OXYCODONE HYDROCHLORIDE AND ACETAMINOPHEN 5; 325 MG/1; MG/1
1 TABLET ORAL ONCE
Status: COMPLETED | OUTPATIENT
Start: 2022-01-01 | End: 2022-01-01

## 2022-01-01 RX ORDER — BISACODYL 10 MG
10 SUPPOSITORY, RECTAL RECTAL ONCE
Status: COMPLETED | OUTPATIENT
Start: 2022-01-01 | End: 2022-01-01

## 2022-01-01 RX ORDER — GABAPENTIN 600 MG/1
300 TABLET ORAL 3 TIMES DAILY
Qty: 90 TABLET | Refills: 0 | Status: SHIPPED | OUTPATIENT
Start: 2022-01-01 | End: 2022-01-01 | Stop reason: HOSPADM

## 2022-01-01 RX ORDER — METHYLPREDNISOLONE SODIUM SUCCINATE 125 MG/2ML
80 INJECTION, POWDER, LYOPHILIZED, FOR SOLUTION INTRAMUSCULAR; INTRAVENOUS ONCE
Status: COMPLETED | OUTPATIENT
Start: 2022-01-01 | End: 2022-01-01

## 2022-01-01 RX ORDER — PREDNISONE 20 MG/1
40 TABLET ORAL DAILY
Status: DISCONTINUED | OUTPATIENT
Start: 2022-01-01 | End: 2022-01-01

## 2022-01-01 RX ORDER — LINEZOLID 2 MG/ML
600 INJECTION, SOLUTION INTRAVENOUS EVERY 12 HOURS
Status: DISCONTINUED | OUTPATIENT
Start: 2022-01-01 | End: 2022-01-01 | Stop reason: HOSPADM

## 2022-01-01 RX ORDER — HYDROXYZINE HYDROCHLORIDE 10 MG/1
10 TABLET, FILM COATED ORAL ONCE
Status: COMPLETED | OUTPATIENT
Start: 2022-01-01 | End: 2022-01-01

## 2022-01-01 RX ORDER — POTASSIUM CHLORIDE 20 MEQ/1
40 TABLET, EXTENDED RELEASE ORAL ONCE
Status: COMPLETED | OUTPATIENT
Start: 2022-01-01 | End: 2022-01-01

## 2022-01-01 RX ORDER — GABAPENTIN 100 MG/1
200 CAPSULE ORAL 3 TIMES DAILY
Status: DISCONTINUED | OUTPATIENT
Start: 2022-01-01 | End: 2022-01-01

## 2022-01-01 RX ORDER — MINERAL OIL AND WHITE PETROLATUM 150; 830 MG/G; MG/G
OINTMENT OPHTHALMIC 2 TIMES DAILY
Status: DISCONTINUED | OUTPATIENT
Start: 2022-01-01 | End: 2022-01-01 | Stop reason: HOSPADM

## 2022-01-01 RX ORDER — AMLODIPINE BESYLATE 5 MG/1
5 TABLET ORAL DAILY
Qty: 30 TABLET | Refills: 3 | Status: SHIPPED | OUTPATIENT
Start: 2022-01-01

## 2022-01-01 RX ORDER — LEVOFLOXACIN 5 MG/ML
750 INJECTION, SOLUTION INTRAVENOUS EVERY 24 HOURS
Status: COMPLETED | OUTPATIENT
Start: 2022-01-01 | End: 2022-01-01

## 2022-01-01 RX ORDER — INSULIN GLARGINE 100 [IU]/ML
15 INJECTION, SOLUTION SUBCUTANEOUS NIGHTLY
Status: DISCONTINUED | OUTPATIENT
Start: 2022-01-01 | End: 2022-01-01

## 2022-01-01 RX ORDER — PROPOFOL 10 MG/ML
INJECTION, EMULSION INTRAVENOUS
Status: COMPLETED
Start: 2022-01-01 | End: 2022-01-01

## 2022-01-01 RX ORDER — PREDNISONE 20 MG/1
40 TABLET ORAL DAILY
Status: COMPLETED | OUTPATIENT
Start: 2022-01-01 | End: 2022-01-01

## 2022-01-01 RX ORDER — MIDODRINE HYDROCHLORIDE 5 MG/1
10 TABLET ORAL
Status: DISCONTINUED | OUTPATIENT
Start: 2022-01-01 | End: 2022-01-01 | Stop reason: HOSPADM

## 2022-01-01 RX ORDER — OXYCODONE HYDROCHLORIDE AND ACETAMINOPHEN 5; 325 MG/1; MG/1
2 TABLET ORAL EVERY 6 HOURS PRN
Status: DISCONTINUED | OUTPATIENT
Start: 2022-01-01 | End: 2022-01-01 | Stop reason: HOSPADM

## 2022-01-01 RX ORDER — NOREPINEPHRINE BIT/0.9 % NACL 16MG/250ML
1-100 INFUSION BOTTLE (ML) INTRAVENOUS CONTINUOUS
Status: DISCONTINUED | OUTPATIENT
Start: 2022-01-01 | End: 2022-01-01

## 2022-01-01 RX ORDER — MIDAZOLAM HYDROCHLORIDE 1 MG/ML
INJECTION INTRAMUSCULAR; INTRAVENOUS
Status: DISPENSED
Start: 2022-01-01 | End: 2022-01-01

## 2022-01-01 RX ORDER — DEXAMETHASONE SODIUM PHOSPHATE 10 MG/ML
6 INJECTION INTRAMUSCULAR; INTRAVENOUS EVERY 24 HOURS
Status: DISCONTINUED | OUTPATIENT
Start: 2022-01-01 | End: 2022-01-01

## 2022-01-01 RX ORDER — GUAIFENESIN 600 MG/1
600 TABLET, EXTENDED RELEASE ORAL 2 TIMES DAILY
Status: DISCONTINUED | OUTPATIENT
Start: 2022-01-01 | End: 2022-01-01 | Stop reason: HOSPADM

## 2022-01-01 RX ORDER — SODIUM CHLORIDE, SODIUM LACTATE, POTASSIUM CHLORIDE, CALCIUM CHLORIDE 600; 310; 30; 20 MG/100ML; MG/100ML; MG/100ML; MG/100ML
INJECTION, SOLUTION INTRAVENOUS CONTINUOUS
Status: DISCONTINUED | OUTPATIENT
Start: 2022-01-01 | End: 2022-01-01

## 2022-01-01 RX ORDER — SODIUM CHLORIDE, SODIUM LACTATE, POTASSIUM CHLORIDE, CALCIUM CHLORIDE 600; 310; 30; 20 MG/100ML; MG/100ML; MG/100ML; MG/100ML
INJECTION, SOLUTION INTRAVENOUS CONTINUOUS
Status: DISCONTINUED | OUTPATIENT
Start: 2022-01-01 | End: 2022-01-01 | Stop reason: HOSPADM

## 2022-01-01 RX ORDER — BUMETANIDE 1 MG/1
2 TABLET ORAL 2 TIMES DAILY
Status: DISCONTINUED | OUTPATIENT
Start: 2022-01-01 | End: 2022-01-01

## 2022-01-01 RX ORDER — POLYETHYLENE GLYCOL 3350 17 G/17G
17 POWDER, FOR SOLUTION ORAL DAILY PRN
Status: DISCONTINUED | OUTPATIENT
Start: 2022-01-01 | End: 2022-01-01 | Stop reason: HOSPADM

## 2022-01-01 RX ORDER — ECHINACEA PURPUREA EXTRACT 125 MG
1 TABLET ORAL PRN
Status: DISCONTINUED | OUTPATIENT
Start: 2022-01-01 | End: 2022-01-01 | Stop reason: HOSPADM

## 2022-01-01 RX ORDER — PREDNISONE 20 MG/1
20 TABLET ORAL DAILY
Qty: 2 TABLET | Refills: 0 | Status: SHIPPED | OUTPATIENT
Start: 2022-01-01 | End: 2022-01-01

## 2022-01-01 RX ORDER — LEVOFLOXACIN 5 MG/ML
750 INJECTION, SOLUTION INTRAVENOUS EVERY 24 HOURS
Status: DISCONTINUED | OUTPATIENT
Start: 2022-01-01 | End: 2022-01-01

## 2022-01-01 RX ORDER — SODIUM CHLORIDE 9 MG/ML
25 INJECTION, SOLUTION INTRAVENOUS PRN
Status: DISCONTINUED | OUTPATIENT
Start: 2022-01-01 | End: 2022-01-01 | Stop reason: HOSPADM

## 2022-01-01 RX ORDER — PANTOPRAZOLE SODIUM 40 MG/1
40 TABLET, DELAYED RELEASE ORAL
Status: DISCONTINUED | OUTPATIENT
Start: 2022-01-01 | End: 2022-01-01 | Stop reason: HOSPADM

## 2022-01-01 RX ORDER — INSULIN LISPRO 100 [IU]/ML
0-12 INJECTION, SOLUTION INTRAVENOUS; SUBCUTANEOUS
Status: DISCONTINUED | OUTPATIENT
Start: 2022-01-01 | End: 2022-01-01

## 2022-01-01 RX ORDER — CALCIUM GLUCONATE 20 MG/ML
2000 INJECTION, SOLUTION INTRAVENOUS ONCE
Status: DISCONTINUED | OUTPATIENT
Start: 2022-01-01 | End: 2022-01-01

## 2022-01-01 RX ORDER — OXYCODONE AND ACETAMINOPHEN 10; 325 MG/1; MG/1
1 TABLET ORAL EVERY 6 HOURS PRN
Status: DISCONTINUED | OUTPATIENT
Start: 2022-01-01 | End: 2022-01-01

## 2022-01-01 RX ORDER — MAGNESIUM SULFATE IN WATER 40 MG/ML
2000 INJECTION, SOLUTION INTRAVENOUS PRN
Status: DISCONTINUED | OUTPATIENT
Start: 2022-01-01 | End: 2022-01-01

## 2022-01-01 RX ORDER — BUMETANIDE 0.25 MG/ML
1 INJECTION, SOLUTION INTRAMUSCULAR; INTRAVENOUS 2 TIMES DAILY
Status: DISCONTINUED | OUTPATIENT
Start: 2022-01-01 | End: 2022-01-01 | Stop reason: HOSPADM

## 2022-01-01 RX ORDER — SPIRONOLACTONE 25 MG/1
25 TABLET ORAL DAILY
Status: DISCONTINUED | OUTPATIENT
Start: 2022-01-01 | End: 2022-01-01 | Stop reason: HOSPADM

## 2022-01-01 RX ORDER — GABAPENTIN 300 MG/1
300 CAPSULE ORAL 3 TIMES DAILY
Qty: 30 CAPSULE | Refills: 0 | Status: SHIPPED | OUTPATIENT
Start: 2022-01-01 | End: 2022-01-01

## 2022-01-01 RX ORDER — DEXTROSE MONOHYDRATE 25 G/50ML
12.5 INJECTION, SOLUTION INTRAVENOUS PRN
Status: DISCONTINUED | OUTPATIENT
Start: 2022-01-01 | End: 2022-01-01 | Stop reason: HOSPADM

## 2022-01-01 RX ORDER — POTASSIUM CHLORIDE 7.45 MG/ML
10 INJECTION INTRAVENOUS
Status: ACTIVE | OUTPATIENT
Start: 2022-01-01 | End: 2022-01-01

## 2022-01-01 RX ORDER — OXYCODONE HYDROCHLORIDE AND ACETAMINOPHEN 5; 325 MG/1; MG/1
1 TABLET ORAL EVERY 6 HOURS PRN
Status: COMPLETED | OUTPATIENT
Start: 2022-01-01 | End: 2022-01-01

## 2022-01-01 RX ORDER — HEPARIN SODIUM 5000 [USP'U]/ML
5000 INJECTION, SOLUTION INTRAVENOUS; SUBCUTANEOUS EVERY 8 HOURS SCHEDULED
Status: DISCONTINUED | OUTPATIENT
Start: 2022-01-01 | End: 2022-01-01 | Stop reason: HOSPADM

## 2022-01-01 RX ORDER — SUCCINYLCHOLINE CHLORIDE 20 MG/ML
INJECTION INTRAMUSCULAR; INTRAVENOUS
Status: DISPENSED
Start: 2022-01-01 | End: 2022-01-01

## 2022-01-01 RX ORDER — PREDNISONE 20 MG/1
20 TABLET ORAL DAILY
Status: COMPLETED | OUTPATIENT
Start: 2022-01-01 | End: 2022-01-01

## 2022-01-01 RX ORDER — METHYLPREDNISOLONE SODIUM SUCCINATE 125 MG/2ML
125 INJECTION, POWDER, LYOPHILIZED, FOR SOLUTION INTRAMUSCULAR; INTRAVENOUS ONCE
Status: COMPLETED | OUTPATIENT
Start: 2022-01-01 | End: 2022-01-01

## 2022-01-01 RX ORDER — MAGNESIUM SULFATE IN WATER 40 MG/ML
2000 INJECTION, SOLUTION INTRAVENOUS ONCE
Status: COMPLETED | OUTPATIENT
Start: 2022-01-01 | End: 2022-01-01

## 2022-01-01 RX ORDER — SODIUM CHLORIDE 0.9 % (FLUSH) 0.9 %
10 SYRINGE (ML) INJECTION EVERY 12 HOURS SCHEDULED
Status: DISCONTINUED | OUTPATIENT
Start: 2022-01-01 | End: 2022-01-01 | Stop reason: HOSPADM

## 2022-01-01 RX ORDER — MAGNESIUM SULFATE IN WATER 40 MG/ML
2000 INJECTION, SOLUTION INTRAVENOUS EVERY 4 HOURS
Status: DISPENSED | OUTPATIENT
Start: 2022-01-01 | End: 2022-01-01

## 2022-01-01 RX ORDER — MIDODRINE HYDROCHLORIDE 5 MG/1
10 TABLET ORAL ONCE
Status: COMPLETED | OUTPATIENT
Start: 2022-01-01 | End: 2022-01-01

## 2022-01-01 RX ORDER — SODIUM CHLORIDE 9 MG/ML
INJECTION, SOLUTION INTRAVENOUS PRN
Status: DISCONTINUED | OUTPATIENT
Start: 2022-01-01 | End: 2022-01-01 | Stop reason: HOSPADM

## 2022-01-01 RX ORDER — SODIUM CHLORIDE, SODIUM LACTATE, POTASSIUM CHLORIDE, AND CALCIUM CHLORIDE .6; .31; .03; .02 G/100ML; G/100ML; G/100ML; G/100ML
500 INJECTION, SOLUTION INTRAVENOUS ONCE
Status: COMPLETED | OUTPATIENT
Start: 2022-01-01 | End: 2022-01-01

## 2022-01-01 RX ORDER — ETOMIDATE 2 MG/ML
INJECTION INTRAVENOUS
Status: COMPLETED
Start: 2022-01-01 | End: 2022-01-01

## 2022-01-01 RX ORDER — POTASSIUM CHLORIDE 7.45 MG/ML
10 INJECTION INTRAVENOUS
Status: DISPENSED | OUTPATIENT
Start: 2022-01-01 | End: 2022-01-01

## 2022-01-01 RX ORDER — DOCUSATE SODIUM 100 MG/1
100 CAPSULE, LIQUID FILLED ORAL DAILY
Status: DISCONTINUED | OUTPATIENT
Start: 2022-01-01 | End: 2022-01-01 | Stop reason: HOSPADM

## 2022-01-01 RX ORDER — FUROSEMIDE 10 MG/ML
INJECTION INTRAMUSCULAR; INTRAVENOUS
Status: DISPENSED
Start: 2022-01-01 | End: 2022-01-01

## 2022-01-01 RX ORDER — PROCHLORPERAZINE EDISYLATE 5 MG/ML
10 INJECTION INTRAMUSCULAR; INTRAVENOUS ONCE
Status: COMPLETED | OUTPATIENT
Start: 2022-01-01 | End: 2022-01-01

## 2022-01-01 RX ORDER — ATORVASTATIN CALCIUM 40 MG/1
40 TABLET, FILM COATED ORAL NIGHTLY
Status: DISCONTINUED | OUTPATIENT
Start: 2022-01-01 | End: 2022-01-01 | Stop reason: HOSPADM

## 2022-01-01 RX ORDER — POTASSIUM CHLORIDE 7.45 MG/ML
10 INJECTION INTRAVENOUS
Status: DISCONTINUED | OUTPATIENT
Start: 2022-01-01 | End: 2022-01-01

## 2022-01-01 RX ORDER — GABAPENTIN 300 MG/1
300 CAPSULE ORAL 3 TIMES DAILY
Status: DISCONTINUED | OUTPATIENT
Start: 2022-01-01 | End: 2022-01-01 | Stop reason: HOSPADM

## 2022-01-01 RX ORDER — ACETAMINOPHEN 325 MG/1
650 TABLET ORAL EVERY 6 HOURS PRN
Status: DISCONTINUED | OUTPATIENT
Start: 2022-01-01 | End: 2022-01-01

## 2022-01-01 RX ORDER — LANOLIN ALCOHOL/MO/W.PET/CERES
325 CREAM (GRAM) TOPICAL 2 TIMES DAILY WITH MEALS
Status: DISCONTINUED | OUTPATIENT
Start: 2022-01-01 | End: 2022-01-01 | Stop reason: HOSPADM

## 2022-01-01 RX ORDER — DOCUSATE SODIUM 100 MG/1
100 CAPSULE, LIQUID FILLED ORAL DAILY
Status: DISCONTINUED | OUTPATIENT
Start: 2022-01-01 | End: 2022-01-01

## 2022-01-01 RX ORDER — GABAPENTIN 400 MG/1
400 CAPSULE ORAL 2 TIMES DAILY
Status: DISCONTINUED | OUTPATIENT
Start: 2022-01-01 | End: 2022-01-01 | Stop reason: HOSPADM

## 2022-01-01 RX ORDER — PROPOFOL 10 MG/ML
INJECTION, EMULSION INTRAVENOUS
Status: DISPENSED
Start: 2022-01-01 | End: 2022-01-01

## 2022-01-01 RX ORDER — OXYCODONE HYDROCHLORIDE AND ACETAMINOPHEN 5; 325 MG/1; MG/1
1 TABLET ORAL EVERY 8 HOURS PRN
Status: DISCONTINUED | OUTPATIENT
Start: 2022-01-01 | End: 2022-01-01

## 2022-01-01 RX ORDER — DIAZEPAM 5 MG/1
5 TABLET ORAL EVERY 12 HOURS PRN
Status: DISCONTINUED | OUTPATIENT
Start: 2022-01-01 | End: 2022-01-01 | Stop reason: HOSPADM

## 2022-01-01 RX ORDER — ACETAMINOPHEN 325 MG/1
650 TABLET ORAL EVERY 6 HOURS PRN
Status: DISCONTINUED | OUTPATIENT
Start: 2022-01-01 | End: 2022-01-01 | Stop reason: HOSPADM

## 2022-01-01 RX ORDER — CHOLECALCIFEROL (VITAMIN D3) 125 MCG
1000 CAPSULE ORAL DAILY
Status: DISCONTINUED | OUTPATIENT
Start: 2022-01-01 | End: 2022-01-01 | Stop reason: HOSPADM

## 2022-01-01 RX ORDER — POTASSIUM CHLORIDE 7.45 MG/ML
10 INJECTION INTRAVENOUS PRN
Status: DISCONTINUED | OUTPATIENT
Start: 2022-01-01 | End: 2022-01-01

## 2022-01-01 RX ORDER — BUMETANIDE 1 MG/1
4 TABLET ORAL 2 TIMES DAILY
Status: DISCONTINUED | OUTPATIENT
Start: 2022-01-01 | End: 2022-01-01

## 2022-01-01 RX ORDER — ALBUTEROL SULFATE 90 UG/1
2 AEROSOL, METERED RESPIRATORY (INHALATION) EVERY 6 HOURS PRN
Status: DISCONTINUED | OUTPATIENT
Start: 2022-01-01 | End: 2022-01-01

## 2022-01-01 RX ORDER — GABAPENTIN 600 MG/1
300 TABLET ORAL 3 TIMES DAILY
Status: DISCONTINUED | OUTPATIENT
Start: 2022-01-01 | End: 2022-01-01 | Stop reason: HOSPADM

## 2022-01-01 RX ORDER — IPRATROPIUM BROMIDE AND ALBUTEROL SULFATE 2.5; .5 MG/3ML; MG/3ML
1 SOLUTION RESPIRATORY (INHALATION) EVERY 4 HOURS PRN
Status: DISCONTINUED | OUTPATIENT
Start: 2022-01-01 | End: 2022-01-01

## 2022-01-01 RX ORDER — DEXTROSE MONOHYDRATE 50 MG/ML
100 INJECTION, SOLUTION INTRAVENOUS PRN
Status: DISCONTINUED | OUTPATIENT
Start: 2022-01-01 | End: 2022-01-01 | Stop reason: HOSPADM

## 2022-01-01 RX ORDER — SODIUM CHLORIDE 9 MG/ML
INJECTION, SOLUTION INTRAVENOUS
Status: COMPLETED
Start: 2022-01-01 | End: 2022-01-01

## 2022-01-01 RX ORDER — LOPERAMIDE HYDROCHLORIDE 2 MG/1
2 CAPSULE ORAL 4 TIMES DAILY PRN
Status: DISCONTINUED | OUTPATIENT
Start: 2022-01-01 | End: 2022-01-01 | Stop reason: HOSPADM

## 2022-01-01 RX ORDER — OXYCODONE HYDROCHLORIDE AND ACETAMINOPHEN 5; 325 MG/1; MG/1
1 TABLET ORAL EVERY 8 HOURS PRN
Qty: 6 TABLET | Refills: 0 | Status: SHIPPED | OUTPATIENT
Start: 2022-01-01 | End: 2022-01-01

## 2022-01-01 RX ORDER — BUMETANIDE 0.25 MG/ML
1 INJECTION, SOLUTION INTRAMUSCULAR; INTRAVENOUS 2 TIMES DAILY
Status: DISCONTINUED | OUTPATIENT
Start: 2022-01-01 | End: 2022-01-01

## 2022-01-01 RX ORDER — INSULIN GLARGINE 100 [IU]/ML
30 INJECTION, SOLUTION SUBCUTANEOUS EVERY MORNING
Status: DISCONTINUED | OUTPATIENT
Start: 2022-01-01 | End: 2022-01-01 | Stop reason: HOSPADM

## 2022-01-01 RX ORDER — ALBUTEROL SULFATE 90 UG/1
2 AEROSOL, METERED RESPIRATORY (INHALATION)
Status: DISCONTINUED | OUTPATIENT
Start: 2022-01-01 | End: 2022-01-01

## 2022-01-01 RX ORDER — PREDNISONE 20 MG/1
20 TABLET ORAL DAILY
Qty: 3 TABLET | Refills: 0 | Status: SHIPPED | OUTPATIENT
Start: 2022-01-01 | End: 2022-01-01

## 2022-01-01 RX ORDER — MIDODRINE HYDROCHLORIDE 5 MG/1
5 TABLET ORAL
Status: DISCONTINUED | OUTPATIENT
Start: 2022-01-01 | End: 2022-01-01

## 2022-01-01 RX ORDER — CALCIUM GLUCONATE 20 MG/ML
2000 INJECTION, SOLUTION INTRAVENOUS ONCE
Status: DISCONTINUED | OUTPATIENT
Start: 2022-01-01 | End: 2022-01-01 | Stop reason: HOSPADM

## 2022-01-01 RX ORDER — OXYCODONE AND ACETAMINOPHEN 10; 325 MG/1; MG/1
1 TABLET ORAL EVERY 6 HOURS PRN
COMMUNITY

## 2022-01-01 RX ORDER — OXYCODONE HCL 5 MG/5 ML
5 SOLUTION, ORAL ORAL EVERY 4 HOURS PRN
Status: DISCONTINUED | OUTPATIENT
Start: 2022-01-01 | End: 2022-01-01

## 2022-01-01 RX ORDER — BUDESONIDE 0.5 MG/2ML
0.5 INHALANT ORAL 2 TIMES DAILY
Status: DISCONTINUED | OUTPATIENT
Start: 2022-01-01 | End: 2022-01-01 | Stop reason: HOSPADM

## 2022-01-01 RX ORDER — SODIUM CHLORIDE 9 MG/ML
INJECTION, SOLUTION INTRAVENOUS CONTINUOUS
Status: DISCONTINUED | OUTPATIENT
Start: 2022-01-01 | End: 2022-01-01

## 2022-01-01 RX ORDER — MAGNESIUM SULFATE 1 G/100ML
1000 INJECTION INTRAVENOUS PRN
Status: DISCONTINUED | OUTPATIENT
Start: 2022-01-01 | End: 2022-01-01 | Stop reason: HOSPADM

## 2022-01-01 RX ORDER — IPRATROPIUM BROMIDE AND ALBUTEROL SULFATE 2.5; .5 MG/3ML; MG/3ML
1 SOLUTION RESPIRATORY (INHALATION) 4 TIMES DAILY
Status: DISCONTINUED | OUTPATIENT
Start: 2022-01-01 | End: 2022-01-01 | Stop reason: HOSPADM

## 2022-01-01 RX ORDER — POTASSIUM CHLORIDE 20 MEQ/1
40 TABLET, EXTENDED RELEASE ORAL PRN
Status: DISCONTINUED | OUTPATIENT
Start: 2022-01-01 | End: 2022-01-01

## 2022-01-01 RX ORDER — HYDRALAZINE HYDROCHLORIDE 25 MG/1
25 TABLET, FILM COATED ORAL EVERY 8 HOURS SCHEDULED
Status: DISCONTINUED | OUTPATIENT
Start: 2022-01-01 | End: 2022-01-01 | Stop reason: HOSPADM

## 2022-01-01 RX ORDER — FAMOTIDINE 20 MG/1
20 TABLET, FILM COATED ORAL DAILY
Status: DISCONTINUED | OUTPATIENT
Start: 2022-01-01 | End: 2022-01-01 | Stop reason: HOSPADM

## 2022-01-01 RX ORDER — FUROSEMIDE 10 MG/ML
40 INJECTION INTRAMUSCULAR; INTRAVENOUS 2 TIMES DAILY
Status: DISCONTINUED | OUTPATIENT
Start: 2022-01-01 | End: 2022-01-01 | Stop reason: HOSPADM

## 2022-01-01 RX ORDER — FUROSEMIDE 10 MG/ML
80 INJECTION INTRAMUSCULAR; INTRAVENOUS 2 TIMES DAILY
Status: DISCONTINUED | OUTPATIENT
Start: 2022-01-01 | End: 2022-01-01

## 2022-01-01 RX ORDER — NOREPINEPHRINE BIT/0.9 % NACL 16MG/250ML
1-100 INFUSION BOTTLE (ML) INTRAVENOUS CONTINUOUS
Status: DISCONTINUED | OUTPATIENT
Start: 2022-01-01 | End: 2022-01-01 | Stop reason: HOSPADM

## 2022-01-01 RX ORDER — ONDANSETRON 2 MG/ML
4 INJECTION INTRAMUSCULAR; INTRAVENOUS ONCE
Status: COMPLETED | OUTPATIENT
Start: 2022-01-01 | End: 2022-01-01

## 2022-01-01 RX ORDER — NITROGLYCERIN 0.4 MG/1
0.4 TABLET SUBLINGUAL ONCE
Status: COMPLETED | OUTPATIENT
Start: 2022-01-01 | End: 2022-01-01

## 2022-01-01 RX ORDER — POLYETHYLENE GLYCOL 3350 17 G/17G
17 POWDER, FOR SOLUTION ORAL DAILY PRN
Status: DISCONTINUED | OUTPATIENT
Start: 2022-01-01 | End: 2022-01-01 | Stop reason: SDUPTHER

## 2022-01-01 RX ORDER — DEXAMETHASONE SODIUM PHOSPHATE 10 MG/ML
10 INJECTION INTRAMUSCULAR; INTRAVENOUS EVERY 24 HOURS
Status: DISCONTINUED | OUTPATIENT
Start: 2022-01-01 | End: 2022-01-01

## 2022-01-01 RX ORDER — OXYCODONE HYDROCHLORIDE 5 MG/1
5 TABLET ORAL EVERY 4 HOURS PRN
Status: DISCONTINUED | OUTPATIENT
Start: 2022-01-01 | End: 2022-01-01 | Stop reason: HOSPADM

## 2022-01-01 RX ORDER — LEVOFLOXACIN 750 MG/1
750 TABLET ORAL DAILY
Status: COMPLETED | OUTPATIENT
Start: 2022-01-01 | End: 2022-01-01

## 2022-01-01 RX ORDER — SENNA AND DOCUSATE SODIUM 50; 8.6 MG/1; MG/1
2 TABLET, FILM COATED ORAL DAILY PRN
Status: DISCONTINUED | OUTPATIENT
Start: 2022-01-01 | End: 2022-01-01 | Stop reason: HOSPADM

## 2022-01-01 RX ORDER — BUMETANIDE 0.25 MG/ML
2 INJECTION, SOLUTION INTRAMUSCULAR; INTRAVENOUS ONCE
Status: COMPLETED | OUTPATIENT
Start: 2022-01-01 | End: 2022-01-01

## 2022-01-01 RX ORDER — METHYLPREDNISOLONE SODIUM SUCCINATE 125 MG/2ML
60 INJECTION, POWDER, LYOPHILIZED, FOR SOLUTION INTRAMUSCULAR; INTRAVENOUS DAILY
Status: DISCONTINUED | OUTPATIENT
Start: 2022-01-01 | End: 2022-01-01

## 2022-01-01 RX ORDER — POTASSIUM CHLORIDE 20 MEQ/1
20 TABLET, EXTENDED RELEASE ORAL 2 TIMES DAILY WITH MEALS
Status: DISCONTINUED | OUTPATIENT
Start: 2022-01-01 | End: 2022-01-01 | Stop reason: HOSPADM

## 2022-01-01 RX ORDER — SUCCINYLCHOLINE CHLORIDE 20 MG/ML
INJECTION INTRAMUSCULAR; INTRAVENOUS
Status: COMPLETED
Start: 2022-01-01 | End: 2022-01-01

## 2022-01-01 RX ORDER — POTASSIUM CHLORIDE 20 MEQ/1
40 TABLET, EXTENDED RELEASE ORAL ONCE
Status: DISCONTINUED | OUTPATIENT
Start: 2022-01-01 | End: 2022-01-01

## 2022-01-01 RX ORDER — ACETAMINOPHEN 325 MG/1
650 TABLET ORAL EVERY 4 HOURS PRN
Status: DISCONTINUED | OUTPATIENT
Start: 2022-01-01 | End: 2022-01-01 | Stop reason: HOSPADM

## 2022-01-01 RX ORDER — BISACODYL 10 MG
10 SUPPOSITORY, RECTAL RECTAL DAILY PRN
Status: DISCONTINUED | OUTPATIENT
Start: 2022-01-01 | End: 2022-01-01 | Stop reason: HOSPADM

## 2022-01-01 RX ORDER — OXYCODONE HYDROCHLORIDE AND ACETAMINOPHEN 5; 325 MG/1; MG/1
1 TABLET ORAL EVERY 6 HOURS PRN
Status: DISCONTINUED | OUTPATIENT
Start: 2022-01-01 | End: 2022-01-01 | Stop reason: HOSPADM

## 2022-01-01 RX ORDER — BENZONATATE 100 MG/1
100 CAPSULE ORAL 3 TIMES DAILY PRN
Status: DISCONTINUED | OUTPATIENT
Start: 2022-01-01 | End: 2022-01-01

## 2022-01-01 RX ORDER — BUMETANIDE 1 MG/1
1 TABLET ORAL 2 TIMES DAILY
Status: DISCONTINUED | OUTPATIENT
Start: 2022-01-01 | End: 2022-01-01 | Stop reason: HOSPADM

## 2022-01-01 RX ORDER — POTASSIUM CHLORIDE 7.45 MG/ML
10 INJECTION INTRAVENOUS ONCE
Status: COMPLETED | OUTPATIENT
Start: 2022-01-01 | End: 2022-01-01

## 2022-01-01 RX ORDER — ALBUTEROL SULFATE 2.5 MG/3ML
2.5 SOLUTION RESPIRATORY (INHALATION)
Status: DISCONTINUED | OUTPATIENT
Start: 2022-01-01 | End: 2022-01-01

## 2022-01-01 RX ORDER — ACETAMINOPHEN 650 MG/1
650 SUPPOSITORY RECTAL EVERY 6 HOURS PRN
Status: DISCONTINUED | OUTPATIENT
Start: 2022-01-01 | End: 2022-01-01

## 2022-01-01 RX ORDER — GABAPENTIN 400 MG/1
400 CAPSULE ORAL 3 TIMES DAILY
Status: DISCONTINUED | OUTPATIENT
Start: 2022-01-01 | End: 2022-01-01

## 2022-01-01 RX ADMIN — SODIUM CHLORIDE, PRESERVATIVE FREE 10 ML: 5 INJECTION INTRAVENOUS at 21:16

## 2022-01-01 RX ADMIN — DESMOPRESSIN ACETATE 40 MG: 0.2 TABLET ORAL at 19:59

## 2022-01-01 RX ADMIN — TIOTROPIUM BROMIDE INHALATION SPRAY 2 PUFF: 3.12 SPRAY, METERED RESPIRATORY (INHALATION) at 09:07

## 2022-01-01 RX ADMIN — SODIUM CHLORIDE, PRESERVATIVE FREE 10 ML: 5 INJECTION INTRAVENOUS at 08:44

## 2022-01-01 RX ADMIN — AMLODIPINE BESYLATE 5 MG: 5 TABLET ORAL at 09:10

## 2022-01-01 RX ADMIN — INSULIN LISPRO 7 UNITS: 100 INJECTION, SOLUTION INTRAVENOUS; SUBCUTANEOUS at 22:00

## 2022-01-01 RX ADMIN — Medication 75 MCG/HR: at 12:35

## 2022-01-01 RX ADMIN — ANTACID TABLETS 500 MG: 500 TABLET, CHEWABLE ORAL at 11:41

## 2022-01-01 RX ADMIN — ALBUTEROL SULFATE 2 PUFF: 90 AEROSOL, METERED RESPIRATORY (INHALATION) at 20:27

## 2022-01-01 RX ADMIN — ALBUTEROL SULFATE 2.5 MG: 2.5 SOLUTION RESPIRATORY (INHALATION) at 11:42

## 2022-01-01 RX ADMIN — PREDNISONE 20 MG: 20 TABLET ORAL at 08:59

## 2022-01-01 RX ADMIN — SPIRONOLACTONE 25 MG: 25 TABLET ORAL at 09:29

## 2022-01-01 RX ADMIN — PANTOPRAZOLE SODIUM 40 MG: 40 TABLET, DELAYED RELEASE ORAL at 06:04

## 2022-01-01 RX ADMIN — PANTOPRAZOLE SODIUM 40 MG: 40 TABLET, DELAYED RELEASE ORAL at 05:38

## 2022-01-01 RX ADMIN — HEPARIN SODIUM 5000 UNITS: 5000 INJECTION INTRAVENOUS; SUBCUTANEOUS at 22:57

## 2022-01-01 RX ADMIN — SODIUM CHLORIDE, PRESERVATIVE FREE 10 ML: 5 INJECTION INTRAVENOUS at 21:54

## 2022-01-01 RX ADMIN — SPIRONOLACTONE 25 MG: 25 TABLET ORAL at 08:32

## 2022-01-01 RX ADMIN — FERROUS SULFATE TAB EC 325 MG (65 MG FE EQUIVALENT) 325 MG: 325 (65 FE) TABLET DELAYED RESPONSE at 07:56

## 2022-01-01 RX ADMIN — BUMETANIDE 1 MG: 0.25 INJECTION, SOLUTION INTRAMUSCULAR; INTRAVENOUS at 19:34

## 2022-01-01 RX ADMIN — HEPARIN SODIUM 5000 UNITS: 5000 INJECTION INTRAVENOUS; SUBCUTANEOUS at 13:41

## 2022-01-01 RX ADMIN — HYDRALAZINE HYDROCHLORIDE 25 MG: 25 TABLET ORAL at 13:38

## 2022-01-01 RX ADMIN — SPIRONOLACTONE 25 MG: 25 TABLET ORAL at 08:18

## 2022-01-01 RX ADMIN — ISOSORBIDE DINITRATE 20 MG: 10 TABLET ORAL at 09:00

## 2022-01-01 RX ADMIN — GABAPENTIN 300 MG: 600 TABLET ORAL at 20:57

## 2022-01-01 RX ADMIN — MORPHINE SULFATE 1 MG: 2 INJECTION, SOLUTION INTRAMUSCULAR; INTRAVENOUS at 05:11

## 2022-01-01 RX ADMIN — Medication 1000 MCG: at 09:11

## 2022-01-01 RX ADMIN — INSULIN LISPRO 2 UNITS: 100 INJECTION, SOLUTION INTRAVENOUS; SUBCUTANEOUS at 09:10

## 2022-01-01 RX ADMIN — GABAPENTIN 300 MG: 600 TABLET ORAL at 21:16

## 2022-01-01 RX ADMIN — HEPARIN SODIUM 5000 UNITS: 5000 INJECTION INTRAVENOUS; SUBCUTANEOUS at 17:07

## 2022-01-01 RX ADMIN — FOLIC ACID 1 MG: 1 TABLET ORAL at 09:29

## 2022-01-01 RX ADMIN — OXYCODONE HYDROCHLORIDE AND ACETAMINOPHEN 2 TABLET: 5; 325 TABLET ORAL at 00:13

## 2022-01-01 RX ADMIN — HYDRALAZINE HYDROCHLORIDE 25 MG: 25 TABLET ORAL at 21:20

## 2022-01-01 RX ADMIN — FUROSEMIDE 40 MG: 10 INJECTION, SOLUTION INTRAMUSCULAR; INTRAVENOUS at 17:19

## 2022-01-01 RX ADMIN — OXYCODONE HYDROCHLORIDE AND ACETAMINOPHEN 1 TABLET: 5; 325 TABLET ORAL at 15:35

## 2022-01-01 RX ADMIN — INSULIN GLARGINE 15 UNITS: 100 INJECTION, SOLUTION SUBCUTANEOUS at 21:00

## 2022-01-01 RX ADMIN — OXYCODONE HYDROCHLORIDE 5 MG: 5 SOLUTION ORAL at 23:20

## 2022-01-01 RX ADMIN — ISOSORBIDE DINITRATE 20 MG: 10 TABLET ORAL at 07:58

## 2022-01-01 RX ADMIN — DESMOPRESSIN ACETATE 40 MG: 0.2 TABLET ORAL at 20:58

## 2022-01-01 RX ADMIN — INSULIN LISPRO 3 UNITS: 100 INJECTION, SOLUTION INTRAVENOUS; SUBCUTANEOUS at 14:22

## 2022-01-01 RX ADMIN — GABAPENTIN 300 MG: 600 TABLET ORAL at 09:21

## 2022-01-01 RX ADMIN — HEPARIN SODIUM 5000 UNITS: 5000 INJECTION INTRAVENOUS; SUBCUTANEOUS at 05:50

## 2022-01-01 RX ADMIN — SODIUM CHLORIDE, PRESERVATIVE FREE 10 ML: 5 INJECTION INTRAVENOUS at 08:06

## 2022-01-01 RX ADMIN — ALBUTEROL SULFATE 2.5 MG: 5 SOLUTION RESPIRATORY (INHALATION) at 04:50

## 2022-01-01 RX ADMIN — HYDRALAZINE HYDROCHLORIDE 25 MG: 25 TABLET ORAL at 22:20

## 2022-01-01 RX ADMIN — Medication 1000 MCG: at 08:25

## 2022-01-01 RX ADMIN — MORPHINE SULFATE 1 MG: 2 INJECTION, SOLUTION INTRAMUSCULAR; INTRAVENOUS at 18:31

## 2022-01-01 RX ADMIN — ACETYLCYSTEINE 600 MG: 200 INHALANT RESPIRATORY (INHALATION) at 04:55

## 2022-01-01 RX ADMIN — SODIUM CHLORIDE, POTASSIUM CHLORIDE, SODIUM LACTATE AND CALCIUM CHLORIDE: 600; 310; 30; 20 INJECTION, SOLUTION INTRAVENOUS at 11:58

## 2022-01-01 RX ADMIN — FOLIC ACID 1 MG: 1 TABLET ORAL at 08:47

## 2022-01-01 RX ADMIN — INSULIN LISPRO 9 UNITS: 100 INJECTION, SOLUTION INTRAVENOUS; SUBCUTANEOUS at 16:04

## 2022-01-01 RX ADMIN — SERTRALINE 100 MG: 50 TABLET, FILM COATED ORAL at 09:10

## 2022-01-01 RX ADMIN — INSULIN LISPRO 2 UNITS: 100 INJECTION, SOLUTION INTRAVENOUS; SUBCUTANEOUS at 13:13

## 2022-01-01 RX ADMIN — ISOSORBIDE DINITRATE 20 MG: 10 TABLET ORAL at 14:20

## 2022-01-01 RX ADMIN — TIOTROPIUM BROMIDE INHALATION SPRAY 2 PUFF: 3.12 SPRAY, METERED RESPIRATORY (INHALATION) at 08:39

## 2022-01-01 RX ADMIN — DEXAMETHASONE SODIUM PHOSPHATE 6 MG: 10 INJECTION INTRAMUSCULAR; INTRAVENOUS at 08:45

## 2022-01-01 RX ADMIN — Medication 5 MCG/MIN: at 20:21

## 2022-01-01 RX ADMIN — BUDESONIDE AND FORMOTEROL FUMARATE DIHYDRATE 2 PUFF: 160; 4.5 AEROSOL RESPIRATORY (INHALATION) at 09:11

## 2022-01-01 RX ADMIN — IPRATROPIUM BROMIDE AND ALBUTEROL SULFATE 1 AMPULE: .5; 3 SOLUTION RESPIRATORY (INHALATION) at 20:26

## 2022-01-01 RX ADMIN — OXYCODONE HYDROCHLORIDE AND ACETAMINOPHEN 1 TABLET: 5; 325 TABLET ORAL at 09:59

## 2022-01-01 RX ADMIN — AMLODIPINE BESYLATE 5 MG: 5 TABLET ORAL at 07:58

## 2022-01-01 RX ADMIN — FUROSEMIDE 80 MG: 10 INJECTION, SOLUTION INTRAMUSCULAR; INTRAVENOUS at 18:40

## 2022-01-01 RX ADMIN — FERROUS SULFATE TAB EC 325 MG (65 MG FE EQUIVALENT) 325 MG: 325 (65 FE) TABLET DELAYED RESPONSE at 08:03

## 2022-01-01 RX ADMIN — BUDESONIDE AND FORMOTEROL FUMARATE DIHYDRATE 2 PUFF: 160; 4.5 AEROSOL RESPIRATORY (INHALATION) at 20:58

## 2022-01-01 RX ADMIN — SODIUM CHLORIDE, PRESERVATIVE FREE 10 ML: 5 INJECTION INTRAVENOUS at 20:31

## 2022-01-01 RX ADMIN — OXYCODONE HYDROCHLORIDE AND ACETAMINOPHEN 2 TABLET: 5; 325 TABLET ORAL at 21:15

## 2022-01-01 RX ADMIN — HYDRALAZINE HYDROCHLORIDE 25 MG: 25 TABLET ORAL at 14:49

## 2022-01-01 RX ADMIN — BUMETANIDE 2 MG: 0.25 INJECTION INTRAMUSCULAR; INTRAVENOUS at 19:59

## 2022-01-01 RX ADMIN — ISOSORBIDE DINITRATE 20 MG: 10 TABLET ORAL at 08:05

## 2022-01-01 RX ADMIN — OXYCODONE HYDROCHLORIDE AND ACETAMINOPHEN 2 TABLET: 5; 325 TABLET ORAL at 05:40

## 2022-01-01 RX ADMIN — MORPHINE SULFATE 1 MG: 2 INJECTION, SOLUTION INTRAMUSCULAR; INTRAVENOUS at 22:47

## 2022-01-01 RX ADMIN — ISOSORBIDE DINITRATE 20 MG: 10 TABLET ORAL at 09:19

## 2022-01-01 RX ADMIN — Medication 1 CAPSULE: at 08:03

## 2022-01-01 RX ADMIN — Medication 5 MCG/MIN: at 20:28

## 2022-01-01 RX ADMIN — OXYCODONE HYDROCHLORIDE AND ACETAMINOPHEN 1 TABLET: 5; 325 TABLET ORAL at 09:26

## 2022-01-01 RX ADMIN — BUMETANIDE 1 MG: 1 TABLET ORAL at 21:15

## 2022-01-01 RX ADMIN — BUMETANIDE 1 MG: 0.25 INJECTION, SOLUTION INTRAMUSCULAR; INTRAVENOUS at 14:35

## 2022-01-01 RX ADMIN — MYCOPHENOLATE MOFETIL 300 MG: 500 TABLET ORAL at 21:04

## 2022-01-01 RX ADMIN — HEPARIN SODIUM 5000 UNITS: 5000 INJECTION INTRAVENOUS; SUBCUTANEOUS at 21:51

## 2022-01-01 RX ADMIN — MYCOPHENOLATE MOFETIL 300 MG: 500 TABLET ORAL at 09:21

## 2022-01-01 RX ADMIN — INSULIN LISPRO 9 UNITS: 100 INJECTION, SOLUTION INTRAVENOUS; SUBCUTANEOUS at 17:18

## 2022-01-01 RX ADMIN — ALBUTEROL SULFATE 2.5 MG: 2.5 SOLUTION RESPIRATORY (INHALATION) at 15:30

## 2022-01-01 RX ADMIN — MYCOPHENOLATE MOFETIL 300 MG: 500 TABLET ORAL at 08:36

## 2022-01-01 RX ADMIN — PREDNISONE 20 MG: 20 TABLET ORAL at 08:46

## 2022-01-01 RX ADMIN — GABAPENTIN 300 MG: 600 TABLET ORAL at 13:53

## 2022-01-01 RX ADMIN — LORAZEPAM 1 MG: 2 INJECTION INTRAMUSCULAR at 23:56

## 2022-01-01 RX ADMIN — MAGNESIUM SULFATE 2000 MG: 2 INJECTION INTRAVENOUS at 16:57

## 2022-01-01 RX ADMIN — BUDESONIDE AND FORMOTEROL FUMARATE DIHYDRATE 2 PUFF: 160; 4.5 AEROSOL RESPIRATORY (INHALATION) at 12:03

## 2022-01-01 RX ADMIN — FUROSEMIDE 80 MG: 10 INJECTION, SOLUTION INTRAMUSCULAR; INTRAVENOUS at 09:17

## 2022-01-01 RX ADMIN — POTASSIUM CHLORIDE 20 MEQ: 20 TABLET, EXTENDED RELEASE ORAL at 07:59

## 2022-01-01 RX ADMIN — Medication 1000 MCG: at 09:49

## 2022-01-01 RX ADMIN — FERROUS SULFATE TAB EC 325 MG (65 MG FE EQUIVALENT) 325 MG: 325 (65 FE) TABLET DELAYED RESPONSE at 17:17

## 2022-01-01 RX ADMIN — CETIRIZINE HYDROCHLORIDE 10 MG: 10 TABLET ORAL at 08:07

## 2022-01-01 RX ADMIN — POTASSIUM BICARBONATE 40 MEQ: 782 TABLET, EFFERVESCENT ORAL at 02:00

## 2022-01-01 RX ADMIN — ACETAMINOPHEN 650 MG: 325 TABLET ORAL at 22:24

## 2022-01-01 RX ADMIN — PROPOFOL 20 MCG/KG/MIN: 10 INJECTION, EMULSION INTRAVENOUS at 07:48

## 2022-01-01 RX ADMIN — BUDESONIDE AND FORMOTEROL FUMARATE DIHYDRATE 2 PUFF: 160; 4.5 AEROSOL RESPIRATORY (INHALATION) at 19:36

## 2022-01-01 RX ADMIN — SODIUM CHLORIDE, PRESERVATIVE FREE 10 ML: 5 INJECTION INTRAVENOUS at 21:00

## 2022-01-01 RX ADMIN — OXYCODONE HYDROCHLORIDE AND ACETAMINOPHEN 2 TABLET: 5; 325 TABLET ORAL at 19:47

## 2022-01-01 RX ADMIN — BUDESONIDE AND FORMOTEROL FUMARATE DIHYDRATE 2 PUFF: 160; 4.5 AEROSOL RESPIRATORY (INHALATION) at 07:36

## 2022-01-01 RX ADMIN — OXYCODONE HYDROCHLORIDE AND ACETAMINOPHEN 2 TABLET: 5; 325 TABLET ORAL at 08:03

## 2022-01-01 RX ADMIN — FUROSEMIDE 80 MG: 10 INJECTION, SOLUTION INTRAMUSCULAR; INTRAVENOUS at 17:02

## 2022-01-01 RX ADMIN — BUDESONIDE 500 MCG: 0.5 SUSPENSION RESPIRATORY (INHALATION) at 08:30

## 2022-01-01 RX ADMIN — SODIUM CHLORIDE 500 ML: 0.9 INJECTION, SOLUTION INTRAVENOUS at 23:52

## 2022-01-01 RX ADMIN — INSULIN LISPRO 4 UNITS: 100 INJECTION, SOLUTION INTRAVENOUS; SUBCUTANEOUS at 16:25

## 2022-01-01 RX ADMIN — INSULIN LISPRO 6 UNITS: 100 INJECTION, SOLUTION INTRAVENOUS; SUBCUTANEOUS at 11:56

## 2022-01-01 RX ADMIN — SERTRALINE 100 MG: 50 TABLET, FILM COATED ORAL at 09:21

## 2022-01-01 RX ADMIN — INSULIN LISPRO 2 UNITS: 100 INJECTION, SOLUTION INTRAVENOUS; SUBCUTANEOUS at 08:27

## 2022-01-01 RX ADMIN — OXYCODONE HYDROCHLORIDE AND ACETAMINOPHEN 1 TABLET: 5; 325 TABLET ORAL at 23:52

## 2022-01-01 RX ADMIN — SODIUM CHLORIDE, PRESERVATIVE FREE 10 ML: 5 INJECTION INTRAVENOUS at 22:04

## 2022-01-01 RX ADMIN — INSULIN LISPRO 1 UNITS: 100 INJECTION, SOLUTION INTRAVENOUS; SUBCUTANEOUS at 21:53

## 2022-01-01 RX ADMIN — FLUTICASONE PROPIONATE 1 SPRAY: 50 SPRAY, METERED NASAL at 08:58

## 2022-01-01 RX ADMIN — ISOSORBIDE DINITRATE 20 MG: 10 TABLET ORAL at 21:44

## 2022-01-01 RX ADMIN — ANTACID TABLETS 500 MG: 500 TABLET, CHEWABLE ORAL at 19:04

## 2022-01-01 RX ADMIN — FLUTICASONE PROPIONATE 1 SPRAY: 50 SPRAY, METERED NASAL at 09:44

## 2022-01-01 RX ADMIN — SODIUM CHLORIDE, PRESERVATIVE FREE 10 ML: 5 INJECTION INTRAVENOUS at 08:10

## 2022-01-01 RX ADMIN — OXYCODONE HYDROCHLORIDE AND ACETAMINOPHEN 2 TABLET: 5; 325 TABLET ORAL at 20:36

## 2022-01-01 RX ADMIN — SERTRALINE 100 MG: 50 TABLET, FILM COATED ORAL at 08:02

## 2022-01-01 RX ADMIN — POTASSIUM CHLORIDE 20 MEQ: 20 TABLET, EXTENDED RELEASE ORAL at 09:51

## 2022-01-01 RX ADMIN — FUROSEMIDE 40 MG: 10 INJECTION, SOLUTION INTRAMUSCULAR; INTRAVENOUS at 08:05

## 2022-01-01 RX ADMIN — GABAPENTIN 300 MG: 600 TABLET ORAL at 20:28

## 2022-01-01 RX ADMIN — HEPARIN SODIUM 5000 UNITS: 5000 INJECTION INTRAVENOUS; SUBCUTANEOUS at 06:04

## 2022-01-01 RX ADMIN — ISOSORBIDE DINITRATE 20 MG: 10 TABLET ORAL at 19:59

## 2022-01-01 RX ADMIN — HYDROXYZINE HYDROCHLORIDE 10 MG: 10 TABLET ORAL at 22:16

## 2022-01-01 RX ADMIN — HYDRALAZINE HYDROCHLORIDE 25 MG: 25 TABLET ORAL at 06:56

## 2022-01-01 RX ADMIN — DIAZEPAM 5 MG: 5 TABLET ORAL at 01:00

## 2022-01-01 RX ADMIN — DIAZEPAM 5 MG: 5 TABLET ORAL at 00:10

## 2022-01-01 RX ADMIN — SODIUM CHLORIDE, PRESERVATIVE FREE 10 ML: 5 INJECTION INTRAVENOUS at 20:16

## 2022-01-01 RX ADMIN — BUDESONIDE AND FORMOTEROL FUMARATE DIHYDRATE 2 PUFF: 160; 4.5 AEROSOL RESPIRATORY (INHALATION) at 20:13

## 2022-01-01 RX ADMIN — ALBUTEROL SULFATE 2.5 MG: 2.5 SOLUTION RESPIRATORY (INHALATION) at 08:15

## 2022-01-01 RX ADMIN — INSULIN LISPRO 3 UNITS: 100 INJECTION, SOLUTION INTRAVENOUS; SUBCUTANEOUS at 11:43

## 2022-01-01 RX ADMIN — SODIUM CHLORIDE, PRESERVATIVE FREE 10 ML: 5 INJECTION INTRAVENOUS at 08:04

## 2022-01-01 RX ADMIN — SODIUM CHLORIDE, PRESERVATIVE FREE 10 ML: 5 INJECTION INTRAVENOUS at 07:57

## 2022-01-01 RX ADMIN — INSULIN LISPRO 2 UNITS: 100 INJECTION, SOLUTION INTRAVENOUS; SUBCUTANEOUS at 09:45

## 2022-01-01 RX ADMIN — SODIUM BICARBONATE 50 MEQ: 84 INJECTION, SOLUTION INTRAVENOUS at 22:56

## 2022-01-01 RX ADMIN — HEPARIN SODIUM 5000 UNITS: 5000 INJECTION INTRAVENOUS; SUBCUTANEOUS at 21:53

## 2022-01-01 RX ADMIN — ONDANSETRON 4 MG: 2 INJECTION INTRAMUSCULAR; INTRAVENOUS at 08:30

## 2022-01-01 RX ADMIN — GABAPENTIN 300 MG: 600 TABLET ORAL at 08:24

## 2022-01-01 RX ADMIN — DESMOPRESSIN ACETATE 40 MG: 0.2 TABLET ORAL at 20:32

## 2022-01-01 RX ADMIN — HEPARIN SODIUM 5000 UNITS: 5000 INJECTION INTRAVENOUS; SUBCUTANEOUS at 14:18

## 2022-01-01 RX ADMIN — Medication 1000 MCG: at 09:16

## 2022-01-01 RX ADMIN — BENZONATATE 100 MG: 100 CAPSULE ORAL at 23:29

## 2022-01-01 RX ADMIN — MYCOPHENOLATE MOFETIL 300 MG: 500 TABLET ORAL at 20:28

## 2022-01-01 RX ADMIN — BUDESONIDE AND FORMOTEROL FUMARATE DIHYDRATE 2 PUFF: 160; 4.5 AEROSOL RESPIRATORY (INHALATION) at 08:48

## 2022-01-01 RX ADMIN — POTASSIUM CHLORIDE 20 MEQ: 20 TABLET, EXTENDED RELEASE ORAL at 17:14

## 2022-01-01 RX ADMIN — DIAZEPAM 5 MG: 5 TABLET ORAL at 23:52

## 2022-01-01 RX ADMIN — INSULIN LISPRO 9 UNITS: 100 INJECTION, SOLUTION INTRAVENOUS; SUBCUTANEOUS at 04:03

## 2022-01-01 RX ADMIN — OXYCODONE HYDROCHLORIDE AND ACETAMINOPHEN 1 TABLET: 5; 325 TABLET ORAL at 22:24

## 2022-01-01 RX ADMIN — ISOSORBIDE DINITRATE 20 MG: 10 TABLET ORAL at 12:30

## 2022-01-01 RX ADMIN — FERROUS SULFATE TAB EC 325 MG (65 MG FE EQUIVALENT) 325 MG: 325 (65 FE) TABLET DELAYED RESPONSE at 09:18

## 2022-01-01 RX ADMIN — ALBUTEROL SULFATE 2.5 MG: 2.5 SOLUTION RESPIRATORY (INHALATION) at 16:33

## 2022-01-01 RX ADMIN — Medication 7 MG/HR: at 05:47

## 2022-01-01 RX ADMIN — GABAPENTIN 300 MG: 600 TABLET ORAL at 14:56

## 2022-01-01 RX ADMIN — SPIRONOLACTONE 25 MG: 25 TABLET ORAL at 08:01

## 2022-01-01 RX ADMIN — HEPARIN SODIUM 5000 UNITS: 5000 INJECTION INTRAVENOUS; SUBCUTANEOUS at 16:00

## 2022-01-01 RX ADMIN — Medication 7 MG/HR: at 22:19

## 2022-01-01 RX ADMIN — GABAPENTIN 200 MG: 100 CAPSULE ORAL at 20:41

## 2022-01-01 RX ADMIN — OXYCODONE HYDROCHLORIDE 5 MG: 5 SOLUTION ORAL at 16:40

## 2022-01-01 RX ADMIN — AMLODIPINE BESYLATE 5 MG: 5 TABLET ORAL at 12:51

## 2022-01-01 RX ADMIN — DESMOPRESSIN ACETATE 40 MG: 0.2 TABLET ORAL at 20:59

## 2022-01-01 RX ADMIN — FERROUS SULFATE TAB EC 325 MG (65 MG FE EQUIVALENT) 325 MG: 325 (65 FE) TABLET DELAYED RESPONSE at 09:57

## 2022-01-01 RX ADMIN — Medication 25 MCG/MIN: at 22:06

## 2022-01-01 RX ADMIN — IPRATROPIUM BROMIDE AND ALBUTEROL SULFATE 1 AMPULE: .5; 3 SOLUTION RESPIRATORY (INHALATION) at 11:54

## 2022-01-01 RX ADMIN — DESMOPRESSIN ACETATE 40 MG: 0.2 TABLET ORAL at 20:21

## 2022-01-01 RX ADMIN — GABAPENTIN 400 MG: 400 CAPSULE ORAL at 20:54

## 2022-01-01 RX ADMIN — MYCOPHENOLATE MOFETIL 300 MG: 500 TABLET ORAL at 07:44

## 2022-01-01 RX ADMIN — SODIUM CHLORIDE, PRESERVATIVE FREE 10 ML: 5 INJECTION INTRAVENOUS at 20:23

## 2022-01-01 RX ADMIN — DIAZEPAM 5 MG: 5 TABLET ORAL at 14:48

## 2022-01-01 RX ADMIN — SERTRALINE 100 MG: 50 TABLET, FILM COATED ORAL at 08:46

## 2022-01-01 RX ADMIN — FOLIC ACID 1 MG: 1 TABLET ORAL at 16:50

## 2022-01-01 RX ADMIN — GUAIFENESIN 600 MG: 600 TABLET, EXTENDED RELEASE ORAL at 08:05

## 2022-01-01 RX ADMIN — SERTRALINE 100 MG: 50 TABLET, FILM COATED ORAL at 08:31

## 2022-01-01 RX ADMIN — FUROSEMIDE 40 MG: 10 INJECTION, SOLUTION INTRAMUSCULAR; INTRAVENOUS at 18:32

## 2022-01-01 RX ADMIN — HEPARIN SODIUM 5000 UNITS: 5000 INJECTION INTRAVENOUS; SUBCUTANEOUS at 09:28

## 2022-01-01 RX ADMIN — FUROSEMIDE 40 MG: 10 INJECTION, SOLUTION INTRAMUSCULAR; INTRAVENOUS at 21:08

## 2022-01-01 RX ADMIN — HYDRALAZINE HYDROCHLORIDE 25 MG: 25 TABLET ORAL at 06:35

## 2022-01-01 RX ADMIN — REMDESIVIR 100 MG: 100 INJECTION, POWDER, LYOPHILIZED, FOR SOLUTION INTRAVENOUS at 12:48

## 2022-01-01 RX ADMIN — SERTRALINE 100 MG: 50 TABLET, FILM COATED ORAL at 16:50

## 2022-01-01 RX ADMIN — HYDRALAZINE HYDROCHLORIDE 25 MG: 25 TABLET ORAL at 14:20

## 2022-01-01 RX ADMIN — DESMOPRESSIN ACETATE 40 MG: 0.2 TABLET ORAL at 20:52

## 2022-01-01 RX ADMIN — BUDESONIDE AND FORMOTEROL FUMARATE DIHYDRATE 2 PUFF: 160; 4.5 AEROSOL RESPIRATORY (INHALATION) at 07:44

## 2022-01-01 RX ADMIN — MYCOPHENOLATE MOFETIL 300 MG: 500 TABLET ORAL at 09:16

## 2022-01-01 RX ADMIN — SPIRONOLACTONE 25 MG: 25 TABLET ORAL at 08:16

## 2022-01-01 RX ADMIN — DESMOPRESSIN ACETATE 40 MG: 0.2 TABLET ORAL at 20:22

## 2022-01-01 RX ADMIN — POLYETHYLENE GLYCOL 3350 17 G: 17 POWDER, FOR SOLUTION ORAL at 08:03

## 2022-01-01 RX ADMIN — LORAZEPAM 1 MG: 2 INJECTION INTRAMUSCULAR at 01:30

## 2022-01-01 RX ADMIN — POLYETHYLENE GLYCOL 3350 17 G: 17 POWDER, FOR SOLUTION ORAL at 08:51

## 2022-01-01 RX ADMIN — HYDRALAZINE HYDROCHLORIDE 25 MG: 25 TABLET ORAL at 13:19

## 2022-01-01 RX ADMIN — HYDRALAZINE HYDROCHLORIDE 25 MG: 25 TABLET ORAL at 21:07

## 2022-01-01 RX ADMIN — HEPARIN SODIUM 5000 UNITS: 5000 INJECTION INTRAVENOUS; SUBCUTANEOUS at 13:35

## 2022-01-01 RX ADMIN — ISOSORBIDE DINITRATE 20 MG: 10 TABLET ORAL at 12:10

## 2022-01-01 RX ADMIN — PIPERACILLIN AND TAZOBACTAM 3375 MG: 3; .375 INJECTION, POWDER, FOR SOLUTION INTRAVENOUS at 00:25

## 2022-01-01 RX ADMIN — HYDRALAZINE HYDROCHLORIDE 25 MG: 25 TABLET ORAL at 20:37

## 2022-01-01 RX ADMIN — LEVOFLOXACIN 750 MG: 750 TABLET, FILM COATED ORAL at 16:59

## 2022-01-01 RX ADMIN — GABAPENTIN 400 MG: 400 CAPSULE ORAL at 19:50

## 2022-01-01 RX ADMIN — MEROPENEM 1000 MG: 1 INJECTION, POWDER, FOR SOLUTION INTRAVENOUS at 23:11

## 2022-01-01 RX ADMIN — MYCOPHENOLATE MOFETIL 300 MG: 500 TABLET ORAL at 12:30

## 2022-01-01 RX ADMIN — ALBUTEROL SULFATE 2.5 MG: 2.5 SOLUTION RESPIRATORY (INHALATION) at 07:36

## 2022-01-01 RX ADMIN — SODIUM CHLORIDE, PRESERVATIVE FREE 10 ML: 5 INJECTION INTRAVENOUS at 19:47

## 2022-01-01 RX ADMIN — SALINE NASAL SPRAY 1 SPRAY: 1.5 SOLUTION NASAL at 07:55

## 2022-01-01 RX ADMIN — SODIUM CHLORIDE, PRESERVATIVE FREE 10 ML: 5 INJECTION INTRAVENOUS at 09:33

## 2022-01-01 RX ADMIN — POTASSIUM BICARBONATE 40 MEQ: 782 TABLET, EFFERVESCENT ORAL at 20:32

## 2022-01-01 RX ADMIN — BUDESONIDE AND FORMOTEROL FUMARATE DIHYDRATE 2 PUFF: 160; 4.5 AEROSOL RESPIRATORY (INHALATION) at 11:37

## 2022-01-01 RX ADMIN — ISOSORBIDE DINITRATE 20 MG: 10 TABLET ORAL at 13:35

## 2022-01-01 RX ADMIN — FERROUS SULFATE TAB EC 325 MG (65 MG FE EQUIVALENT) 325 MG: 325 (65 FE) TABLET DELAYED RESPONSE at 16:13

## 2022-01-01 RX ADMIN — GABAPENTIN 300 MG: 600 TABLET ORAL at 08:17

## 2022-01-01 RX ADMIN — ALBUTEROL SULFATE 2.5 MG: 5 SOLUTION RESPIRATORY (INHALATION) at 23:45

## 2022-01-01 RX ADMIN — FERROUS SULFATE TAB EC 325 MG (65 MG FE EQUIVALENT) 325 MG: 325 (65 FE) TABLET DELAYED RESPONSE at 17:06

## 2022-01-01 RX ADMIN — OXYCODONE HYDROCHLORIDE AND ACETAMINOPHEN 2 TABLET: 5; 325 TABLET ORAL at 14:28

## 2022-01-01 RX ADMIN — ALBUTEROL SULFATE 2.5 MG: 2.5 SOLUTION RESPIRATORY (INHALATION) at 07:51

## 2022-01-01 RX ADMIN — PROPOFOL 35 MCG/KG/MIN: 10 INJECTION, EMULSION INTRAVENOUS at 14:08

## 2022-01-01 RX ADMIN — FUROSEMIDE 40 MG: 10 INJECTION, SOLUTION INTRAMUSCULAR; INTRAVENOUS at 18:16

## 2022-01-01 RX ADMIN — SPIRONOLACTONE 25 MG: 25 TABLET ORAL at 09:18

## 2022-01-01 RX ADMIN — ISOSORBIDE DINITRATE 20 MG: 10 TABLET ORAL at 17:10

## 2022-01-01 RX ADMIN — GABAPENTIN 200 MG: 100 CAPSULE ORAL at 11:34

## 2022-01-01 RX ADMIN — ALBUTEROL SULFATE 2.5 MG: 2.5 SOLUTION RESPIRATORY (INHALATION) at 15:37

## 2022-01-01 RX ADMIN — Medication 50 MCG/HR: at 20:18

## 2022-01-01 RX ADMIN — DESMOPRESSIN ACETATE 40 MG: 0.2 TABLET ORAL at 21:04

## 2022-01-01 RX ADMIN — IPRATROPIUM BROMIDE AND ALBUTEROL SULFATE 1 AMPULE: .5; 3 SOLUTION RESPIRATORY (INHALATION) at 16:30

## 2022-01-01 RX ADMIN — ISOSORBIDE DINITRATE 20 MG: 10 TABLET ORAL at 14:27

## 2022-01-01 RX ADMIN — SODIUM CHLORIDE, POTASSIUM CHLORIDE, SODIUM LACTATE AND CALCIUM CHLORIDE: 600; 310; 30; 20 INJECTION, SOLUTION INTRAVENOUS at 12:09

## 2022-01-01 RX ADMIN — Medication 1000 MCG: at 09:29

## 2022-01-01 RX ADMIN — FERROUS SULFATE TAB EC 325 MG (65 MG FE EQUIVALENT) 325 MG: 325 (65 FE) TABLET DELAYED RESPONSE at 09:23

## 2022-01-01 RX ADMIN — AMLODIPINE BESYLATE 5 MG: 5 TABLET ORAL at 08:04

## 2022-01-01 RX ADMIN — ENOXAPARIN SODIUM 40 MG: 40 INJECTION SUBCUTANEOUS at 09:00

## 2022-01-01 RX ADMIN — ONDANSETRON 4 MG: 2 INJECTION INTRAMUSCULAR; INTRAVENOUS at 10:06

## 2022-01-01 RX ADMIN — HEPARIN SODIUM 5000 UNITS: 5000 INJECTION INTRAVENOUS; SUBCUTANEOUS at 21:01

## 2022-01-01 RX ADMIN — PANTOPRAZOLE SODIUM 40 MG: 40 TABLET, DELAYED RELEASE ORAL at 05:34

## 2022-01-01 RX ADMIN — BUDESONIDE AND FORMOTEROL FUMARATE DIHYDRATE 2 PUFF: 160; 4.5 AEROSOL RESPIRATORY (INHALATION) at 21:03

## 2022-01-01 RX ADMIN — PANTOPRAZOLE SODIUM 40 MG: 40 TABLET, DELAYED RELEASE ORAL at 05:06

## 2022-01-01 RX ADMIN — HEPARIN SODIUM 5000 UNITS: 5000 INJECTION INTRAVENOUS; SUBCUTANEOUS at 13:32

## 2022-01-01 RX ADMIN — POTASSIUM CHLORIDE 10 MEQ: 7.46 INJECTION, SOLUTION INTRAVENOUS at 16:29

## 2022-01-01 RX ADMIN — GABAPENTIN 300 MG: 600 TABLET ORAL at 14:20

## 2022-01-01 RX ADMIN — OXYCODONE HYDROCHLORIDE AND ACETAMINOPHEN 2 TABLET: 5; 325 TABLET ORAL at 19:30

## 2022-01-01 RX ADMIN — SODIUM CHLORIDE, PRESERVATIVE FREE 10 ML: 5 INJECTION INTRAVENOUS at 20:42

## 2022-01-01 RX ADMIN — DESMOPRESSIN ACETATE 40 MG: 0.2 TABLET ORAL at 22:32

## 2022-01-01 RX ADMIN — SODIUM CHLORIDE, PRESERVATIVE FREE 10 ML: 5 INJECTION INTRAVENOUS at 21:07

## 2022-01-01 RX ADMIN — BUDESONIDE AND FORMOTEROL FUMARATE DIHYDRATE 2 PUFF: 160; 4.5 AEROSOL RESPIRATORY (INHALATION) at 09:17

## 2022-01-01 RX ADMIN — ISOSORBIDE DINITRATE 20 MG: 10 TABLET ORAL at 20:27

## 2022-01-01 RX ADMIN — SODIUM CHLORIDE, PRESERVATIVE FREE 10 ML: 5 INJECTION INTRAVENOUS at 20:24

## 2022-01-01 RX ADMIN — TIOTROPIUM BROMIDE INHALATION SPRAY 2 PUFF: 3.12 SPRAY, METERED RESPIRATORY (INHALATION) at 11:37

## 2022-01-01 RX ADMIN — GABAPENTIN 300 MG: 600 TABLET ORAL at 13:30

## 2022-01-01 RX ADMIN — ISOSORBIDE DINITRATE 20 MG: 10 TABLET ORAL at 13:41

## 2022-01-01 RX ADMIN — DESMOPRESSIN ACETATE 40 MG: 0.2 TABLET ORAL at 20:00

## 2022-01-01 RX ADMIN — ISOSORBIDE DINITRATE 20 MG: 10 TABLET ORAL at 08:46

## 2022-01-01 RX ADMIN — GUAIFENESIN 600 MG: 600 TABLET, EXTENDED RELEASE ORAL at 00:20

## 2022-01-01 RX ADMIN — ISOSORBIDE DINITRATE 20 MG: 10 TABLET ORAL at 17:21

## 2022-01-01 RX ADMIN — Medication 100 MCG/HR: at 08:20

## 2022-01-01 RX ADMIN — MYCOPHENOLATE MOFETIL 300 MG: 500 TABLET ORAL at 09:29

## 2022-01-01 RX ADMIN — HYDRALAZINE HYDROCHLORIDE 25 MG: 25 TABLET ORAL at 17:08

## 2022-01-01 RX ADMIN — TIOTROPIUM BROMIDE INHALATION SPRAY 2 PUFF: 3.12 SPRAY, METERED RESPIRATORY (INHALATION) at 07:55

## 2022-01-01 RX ADMIN — INSULIN LISPRO 3 UNITS: 100 INJECTION, SOLUTION INTRAVENOUS; SUBCUTANEOUS at 12:31

## 2022-01-01 RX ADMIN — SODIUM CHLORIDE, PRESERVATIVE FREE 10 ML: 5 INJECTION INTRAVENOUS at 20:45

## 2022-01-01 RX ADMIN — MIDODRINE HYDROCHLORIDE 5 MG: 5 TABLET ORAL at 08:06

## 2022-01-01 RX ADMIN — PANTOPRAZOLE SODIUM 40 MG: 40 TABLET, DELAYED RELEASE ORAL at 06:56

## 2022-01-01 RX ADMIN — AMLODIPINE BESYLATE 5 MG: 5 TABLET ORAL at 09:18

## 2022-01-01 RX ADMIN — HYDRALAZINE HYDROCHLORIDE 25 MG: 25 TABLET ORAL at 05:31

## 2022-01-01 RX ADMIN — INSULIN LISPRO 1 UNITS: 100 INJECTION, SOLUTION INTRAVENOUS; SUBCUTANEOUS at 08:51

## 2022-01-01 RX ADMIN — HEPARIN SODIUM 5000 UNITS: 5000 INJECTION INTRAVENOUS; SUBCUTANEOUS at 05:20

## 2022-01-01 RX ADMIN — ENOXAPARIN SODIUM 30 MG: 100 INJECTION SUBCUTANEOUS at 08:48

## 2022-01-01 RX ADMIN — ISOSORBIDE DINITRATE 20 MG: 10 TABLET ORAL at 13:52

## 2022-01-01 RX ADMIN — IPRATROPIUM BROMIDE AND ALBUTEROL SULFATE 1 AMPULE: .5; 3 SOLUTION RESPIRATORY (INHALATION) at 20:47

## 2022-01-01 RX ADMIN — HYDRALAZINE HYDROCHLORIDE 25 MG: 25 TABLET ORAL at 13:35

## 2022-01-01 RX ADMIN — PANTOPRAZOLE SODIUM 40 MG: 40 TABLET, DELAYED RELEASE ORAL at 06:39

## 2022-01-01 RX ADMIN — GUAIFENESIN 600 MG: 600 TABLET, EXTENDED RELEASE ORAL at 09:33

## 2022-01-01 RX ADMIN — ACETAMINOPHEN 650 MG: 325 TABLET ORAL at 05:49

## 2022-01-01 RX ADMIN — POTASSIUM CHLORIDE 20 MEQ: 20 TABLET, EXTENDED RELEASE ORAL at 18:56

## 2022-01-01 RX ADMIN — GABAPENTIN 300 MG: 600 TABLET ORAL at 19:30

## 2022-01-01 RX ADMIN — METHYLPREDNISOLONE SODIUM SUCCINATE 80 MG: 125 INJECTION, POWDER, FOR SOLUTION INTRAMUSCULAR; INTRAVENOUS at 22:29

## 2022-01-01 RX ADMIN — DOCUSATE SODIUM 100 MG: 100 CAPSULE ORAL at 09:32

## 2022-01-01 RX ADMIN — AMLODIPINE BESYLATE 5 MG: 5 TABLET ORAL at 08:58

## 2022-01-01 RX ADMIN — CISATRACURIUM BESYLATE 2.5 MCG/KG/MIN: 10 INJECTION, SOLUTION INTRAVENOUS at 08:08

## 2022-01-01 RX ADMIN — BUDESONIDE AND FORMOTEROL FUMARATE DIHYDRATE 2 PUFF: 160; 4.5 AEROSOL RESPIRATORY (INHALATION) at 22:27

## 2022-01-01 RX ADMIN — DIAZEPAM 5 MG: 5 TABLET ORAL at 03:54

## 2022-01-01 RX ADMIN — AMLODIPINE BESYLATE 5 MG: 5 TABLET ORAL at 09:28

## 2022-01-01 RX ADMIN — HYDRALAZINE HYDROCHLORIDE 25 MG: 25 TABLET ORAL at 05:46

## 2022-01-01 RX ADMIN — OXYCODONE HYDROCHLORIDE AND ACETAMINOPHEN 1 TABLET: 5; 325 TABLET ORAL at 22:58

## 2022-01-01 RX ADMIN — DOCUSATE SODIUM 100 MG: 100 CAPSULE ORAL at 09:22

## 2022-01-01 RX ADMIN — ALBUTEROL SULFATE 2.5 MG: 2.5 SOLUTION RESPIRATORY (INHALATION) at 08:10

## 2022-01-01 RX ADMIN — ISOSORBIDE DINITRATE 20 MG: 10 TABLET ORAL at 17:08

## 2022-01-01 RX ADMIN — GUAIFENESIN 600 MG: 600 TABLET, EXTENDED RELEASE ORAL at 21:06

## 2022-01-01 RX ADMIN — GUAIFENESIN 600 MG: 600 TABLET, EXTENDED RELEASE ORAL at 21:44

## 2022-01-01 RX ADMIN — POTASSIUM CHLORIDE 20 MEQ: 20 TABLET, EXTENDED RELEASE ORAL at 17:10

## 2022-01-01 RX ADMIN — BUMETANIDE 2 MG: 0.25 INJECTION INTRAMUSCULAR; INTRAVENOUS at 08:06

## 2022-01-01 RX ADMIN — FOLIC ACID 1 MG: 1 TABLET ORAL at 07:45

## 2022-01-01 RX ADMIN — ISOSORBIDE DINITRATE 20 MG: 10 TABLET ORAL at 08:25

## 2022-01-01 RX ADMIN — MEROPENEM 1000 MG: 1 INJECTION, POWDER, FOR SOLUTION INTRAVENOUS at 10:56

## 2022-01-01 RX ADMIN — POLYETHYLENE GLYCOL 3350 17 G: 17 POWDER, FOR SOLUTION ORAL at 08:59

## 2022-01-01 RX ADMIN — ISOSORBIDE DINITRATE 20 MG: 10 TABLET ORAL at 08:57

## 2022-01-01 RX ADMIN — DOCUSATE SODIUM 100 MG: 100 CAPSULE ORAL at 08:24

## 2022-01-01 RX ADMIN — ISOSORBIDE DINITRATE 20 MG: 10 TABLET ORAL at 20:57

## 2022-01-01 RX ADMIN — INSULIN LISPRO 2 UNITS: 100 INJECTION, SOLUTION INTRAVENOUS; SUBCUTANEOUS at 21:00

## 2022-01-01 RX ADMIN — SALINE NASAL SPRAY 1 SPRAY: 1.5 SOLUTION NASAL at 19:34

## 2022-01-01 RX ADMIN — MYCOPHENOLATE MOFETIL 300 MG: 500 TABLET ORAL at 20:31

## 2022-01-01 RX ADMIN — BUDESONIDE AND FORMOTEROL FUMARATE DIHYDRATE 2 PUFF: 160; 4.5 AEROSOL RESPIRATORY (INHALATION) at 07:50

## 2022-01-01 RX ADMIN — ISOSORBIDE DINITRATE 20 MG: 10 TABLET ORAL at 09:28

## 2022-01-01 RX ADMIN — FAMOTIDINE 20 MG: 20 TABLET, FILM COATED ORAL at 09:00

## 2022-01-01 RX ADMIN — ALBUTEROL SULFATE 2 PUFF: 90 AEROSOL, METERED RESPIRATORY (INHALATION) at 12:25

## 2022-01-01 RX ADMIN — INSULIN LISPRO 2 UNITS: 100 INJECTION, SOLUTION INTRAVENOUS; SUBCUTANEOUS at 13:36

## 2022-01-01 RX ADMIN — HEPARIN SODIUM 5000 UNITS: 5000 INJECTION INTRAVENOUS; SUBCUTANEOUS at 21:46

## 2022-01-01 RX ADMIN — INSULIN LISPRO 9 UNITS: 100 INJECTION, SOLUTION INTRAVENOUS; SUBCUTANEOUS at 19:57

## 2022-01-01 RX ADMIN — POTASSIUM CHLORIDE 40 MEQ: 20 TABLET, EXTENDED RELEASE ORAL at 09:40

## 2022-01-01 RX ADMIN — ENOXAPARIN SODIUM 30 MG: 100 INJECTION SUBCUTANEOUS at 09:22

## 2022-01-01 RX ADMIN — ENOXAPARIN SODIUM 30 MG: 100 INJECTION SUBCUTANEOUS at 19:47

## 2022-01-01 RX ADMIN — SERTRALINE 100 MG: 50 TABLET, FILM COATED ORAL at 09:29

## 2022-01-01 RX ADMIN — HEPARIN SODIUM 5000 UNITS: 5000 INJECTION INTRAVENOUS; SUBCUTANEOUS at 22:00

## 2022-01-01 RX ADMIN — INSULIN LISPRO 5 UNITS: 100 INJECTION, SOLUTION INTRAVENOUS; SUBCUTANEOUS at 16:08

## 2022-01-01 RX ADMIN — BUDESONIDE AND FORMOTEROL FUMARATE DIHYDRATE 2 PUFF: 160; 4.5 AEROSOL RESPIRATORY (INHALATION) at 21:18

## 2022-01-01 RX ADMIN — SERTRALINE 100 MG: 50 TABLET, FILM COATED ORAL at 08:58

## 2022-01-01 RX ADMIN — HEPARIN SODIUM 5000 UNITS: 5000 INJECTION INTRAVENOUS; SUBCUTANEOUS at 15:01

## 2022-01-01 RX ADMIN — FERROUS SULFATE TAB EC 325 MG (65 MG FE EQUIVALENT) 325 MG: 325 (65 FE) TABLET DELAYED RESPONSE at 09:10

## 2022-01-01 RX ADMIN — FLUTICASONE PROPIONATE 1 SPRAY: 50 SPRAY, METERED NASAL at 09:00

## 2022-01-01 RX ADMIN — SODIUM CHLORIDE, PRESERVATIVE FREE 10 ML: 5 INJECTION INTRAVENOUS at 08:25

## 2022-01-01 RX ADMIN — HEPARIN SODIUM 5000 UNITS: 5000 INJECTION INTRAVENOUS; SUBCUTANEOUS at 21:20

## 2022-01-01 RX ADMIN — SERTRALINE 100 MG: 50 TABLET, FILM COATED ORAL at 08:24

## 2022-01-01 RX ADMIN — FOLIC ACID 1 MG: 1 TABLET ORAL at 07:56

## 2022-01-01 RX ADMIN — INSULIN LISPRO 4 UNITS: 100 INJECTION, SOLUTION INTRAVENOUS; SUBCUTANEOUS at 12:23

## 2022-01-01 RX ADMIN — POLYETHYLENE GLYCOL 3350 17 G: 17 POWDER, FOR SOLUTION ORAL at 20:00

## 2022-01-01 RX ADMIN — DESMOPRESSIN ACETATE 40 MG: 0.2 TABLET ORAL at 21:20

## 2022-01-01 RX ADMIN — PREDNISONE 40 MG: 20 TABLET ORAL at 07:56

## 2022-01-01 RX ADMIN — HEPARIN SODIUM 5000 UNITS: 5000 INJECTION INTRAVENOUS; SUBCUTANEOUS at 22:45

## 2022-01-01 RX ADMIN — ISOSORBIDE DINITRATE 20 MG: 10 TABLET ORAL at 18:32

## 2022-01-01 RX ADMIN — BUDESONIDE AND FORMOTEROL FUMARATE DIHYDRATE 2 PUFF: 160; 4.5 AEROSOL RESPIRATORY (INHALATION) at 08:27

## 2022-01-01 RX ADMIN — SODIUM CHLORIDE 25 ML: 9 INJECTION, SOLUTION INTRAVENOUS at 08:40

## 2022-01-01 RX ADMIN — PREDNISONE 40 MG: 20 TABLET ORAL at 08:14

## 2022-01-01 RX ADMIN — INSULIN LISPRO 3 UNITS: 100 INJECTION, SOLUTION INTRAVENOUS; SUBCUTANEOUS at 12:30

## 2022-01-01 RX ADMIN — HEPARIN SODIUM 5000 UNITS: 5000 INJECTION INTRAVENOUS; SUBCUTANEOUS at 06:29

## 2022-01-01 RX ADMIN — DESMOPRESSIN ACETATE 40 MG: 0.2 TABLET ORAL at 20:07

## 2022-01-01 RX ADMIN — MYCOPHENOLATE MOFETIL 300 MG: 500 TABLET ORAL at 08:41

## 2022-01-01 RX ADMIN — FLUTICASONE PROPIONATE 1 SPRAY: 50 SPRAY, METERED NASAL at 08:48

## 2022-01-01 RX ADMIN — LEVOFLOXACIN 750 MG: 5 INJECTION, SOLUTION INTRAVENOUS at 12:09

## 2022-01-01 RX ADMIN — OXYCODONE HYDROCHLORIDE AND ACETAMINOPHEN 2 TABLET: 5; 325 TABLET ORAL at 14:55

## 2022-01-01 RX ADMIN — ISOSORBIDE DINITRATE 20 MG: 10 TABLET ORAL at 09:21

## 2022-01-01 RX ADMIN — INSULIN LISPRO 1 UNITS: 100 INJECTION, SOLUTION INTRAVENOUS; SUBCUTANEOUS at 22:17

## 2022-01-01 RX ADMIN — PREDNISONE 20 MG: 20 TABLET ORAL at 08:54

## 2022-01-01 RX ADMIN — BUMETANIDE 1 MG: 0.25 INJECTION, SOLUTION INTRAMUSCULAR; INTRAVENOUS at 09:33

## 2022-01-01 RX ADMIN — ALBUTEROL SULFATE 2.5 MG: 2.5 SOLUTION RESPIRATORY (INHALATION) at 21:00

## 2022-01-01 RX ADMIN — PREDNISONE 20 MG: 20 TABLET ORAL at 09:21

## 2022-01-01 RX ADMIN — OXYCODONE HYDROCHLORIDE AND ACETAMINOPHEN 2 TABLET: 5; 325 TABLET ORAL at 02:12

## 2022-01-01 RX ADMIN — ENOXAPARIN SODIUM 30 MG: 100 INJECTION SUBCUTANEOUS at 20:05

## 2022-01-01 RX ADMIN — ALBUTEROL SULFATE 2.5 MG: 2.5 SOLUTION RESPIRATORY (INHALATION) at 11:15

## 2022-01-01 RX ADMIN — GABAPENTIN 300 MG: 600 TABLET ORAL at 21:44

## 2022-01-01 RX ADMIN — ALBUTEROL SULFATE 2.5 MG: 2.5 SOLUTION RESPIRATORY (INHALATION) at 19:40

## 2022-01-01 RX ADMIN — INSULIN LISPRO 2 UNITS: 100 INJECTION, SOLUTION INTRAVENOUS; SUBCUTANEOUS at 12:19

## 2022-01-01 RX ADMIN — INSULIN LISPRO 1 UNITS: 100 INJECTION, SOLUTION INTRAVENOUS; SUBCUTANEOUS at 11:48

## 2022-01-01 RX ADMIN — SODIUM CHLORIDE, PRESERVATIVE FREE 10 ML: 5 INJECTION INTRAVENOUS at 09:00

## 2022-01-01 RX ADMIN — GABAPENTIN 200 MG: 100 CAPSULE ORAL at 09:00

## 2022-01-01 RX ADMIN — ISOSORBIDE DINITRATE 20 MG: 10 TABLET ORAL at 18:31

## 2022-01-01 RX ADMIN — INSULIN LISPRO 4 UNITS: 100 INJECTION, SOLUTION INTRAVENOUS; SUBCUTANEOUS at 20:29

## 2022-01-01 RX ADMIN — ENOXAPARIN SODIUM 30 MG: 100 INJECTION SUBCUTANEOUS at 20:59

## 2022-01-01 RX ADMIN — ALBUTEROL SULFATE 2.5 MG: 2.5 SOLUTION RESPIRATORY (INHALATION) at 16:18

## 2022-01-01 RX ADMIN — LORAZEPAM 1 MG: 2 INJECTION INTRAMUSCULAR at 23:29

## 2022-01-01 RX ADMIN — DIAZEPAM 5 MG: 5 TABLET ORAL at 00:52

## 2022-01-01 RX ADMIN — BUMETANIDE 1 MG: 0.25 INJECTION, SOLUTION INTRAMUSCULAR; INTRAVENOUS at 20:03

## 2022-01-01 RX ADMIN — DESMOPRESSIN ACETATE 40 MG: 0.2 TABLET ORAL at 19:53

## 2022-01-01 RX ADMIN — OXYCODONE 5 MG: 5 TABLET ORAL at 03:09

## 2022-01-01 RX ADMIN — ISOSORBIDE DINITRATE 20 MG: 10 TABLET ORAL at 18:40

## 2022-01-01 RX ADMIN — INSULIN LISPRO 9 UNITS: 100 INJECTION, SOLUTION INTRAVENOUS; SUBCUTANEOUS at 16:42

## 2022-01-01 RX ADMIN — CISATRACURIUM BESYLATE 2 MCG/KG/MIN: 10 INJECTION, SOLUTION INTRAVENOUS at 14:18

## 2022-01-01 RX ADMIN — HYDRALAZINE HYDROCHLORIDE 25 MG: 25 TABLET ORAL at 21:47

## 2022-01-01 RX ADMIN — BUMETANIDE 2 MG: 0.25 INJECTION INTRAMUSCULAR; INTRAVENOUS at 22:24

## 2022-01-01 RX ADMIN — FERROUS SULFATE TAB EC 325 MG (65 MG FE EQUIVALENT) 325 MG: 325 (65 FE) TABLET DELAYED RESPONSE at 08:58

## 2022-01-01 RX ADMIN — FUROSEMIDE 40 MG: 10 INJECTION, SOLUTION INTRAMUSCULAR; INTRAVENOUS at 08:24

## 2022-01-01 RX ADMIN — GABAPENTIN 400 MG: 400 CAPSULE ORAL at 08:06

## 2022-01-01 RX ADMIN — ISOSORBIDE DINITRATE 20 MG: 10 TABLET ORAL at 14:42

## 2022-01-01 RX ADMIN — ALBUTEROL SULFATE 2.5 MG: 2.5 SOLUTION RESPIRATORY (INHALATION) at 08:11

## 2022-01-01 RX ADMIN — OXYCODONE HYDROCHLORIDE AND ACETAMINOPHEN 1 TABLET: 5; 325 TABLET ORAL at 23:21

## 2022-01-01 RX ADMIN — POTASSIUM CHLORIDE 20 MEQ: 20 TABLET, EXTENDED RELEASE ORAL at 09:10

## 2022-01-01 RX ADMIN — SODIUM CHLORIDE, PRESERVATIVE FREE 10 ML: 5 INJECTION INTRAVENOUS at 20:04

## 2022-01-01 RX ADMIN — GUAIFENESIN 600 MG: 600 TABLET, EXTENDED RELEASE ORAL at 08:24

## 2022-01-01 RX ADMIN — FERROUS SULFATE TAB EC 325 MG (65 MG FE EQUIVALENT) 325 MG: 325 (65 FE) TABLET DELAYED RESPONSE at 17:20

## 2022-01-01 RX ADMIN — GABAPENTIN 400 MG: 400 CAPSULE ORAL at 22:05

## 2022-01-01 RX ADMIN — FERROUS SULFATE TAB EC 325 MG (65 MG FE EQUIVALENT) 325 MG: 325 (65 FE) TABLET DELAYED RESPONSE at 07:45

## 2022-01-01 RX ADMIN — CETIRIZINE HYDROCHLORIDE 10 MG: 10 TABLET ORAL at 20:41

## 2022-01-01 RX ADMIN — ALBUTEROL SULFATE 2.5 MG: 2.5 SOLUTION RESPIRATORY (INHALATION) at 07:56

## 2022-01-01 RX ADMIN — DOCUSATE SODIUM 100 MG: 100 CAPSULE ORAL at 08:51

## 2022-01-01 RX ADMIN — ISOSORBIDE DINITRATE 20 MG: 10 TABLET ORAL at 21:07

## 2022-01-01 RX ADMIN — BISACODYL 10 MG: 5 TABLET, COATED ORAL at 16:05

## 2022-01-01 RX ADMIN — OXYCODONE 5 MG: 5 TABLET ORAL at 03:14

## 2022-01-01 RX ADMIN — BUDESONIDE AND FORMOTEROL FUMARATE DIHYDRATE 2 PUFF: 160; 4.5 AEROSOL RESPIRATORY (INHALATION) at 19:48

## 2022-01-01 RX ADMIN — POLYETHYLENE GLYCOL 3350 17 G: 17 POWDER, FOR SOLUTION ORAL at 12:02

## 2022-01-01 RX ADMIN — DESMOPRESSIN ACETATE 40 MG: 0.2 TABLET ORAL at 20:25

## 2022-01-01 RX ADMIN — SPIRONOLACTONE 25 MG: 25 TABLET ORAL at 09:49

## 2022-01-01 RX ADMIN — BUMETANIDE 2 MG: 0.25 INJECTION INTRAMUSCULAR; INTRAVENOUS at 22:03

## 2022-01-01 RX ADMIN — ALBUTEROL SULFATE 2.5 MG: 2.5 SOLUTION RESPIRATORY (INHALATION) at 20:15

## 2022-01-01 RX ADMIN — FOLIC ACID 1 MG: 1 TABLET ORAL at 09:16

## 2022-01-01 RX ADMIN — Medication 1000 MCG: at 07:59

## 2022-01-01 RX ADMIN — POTASSIUM CHLORIDE 10 MEQ: 7.46 INJECTION, SOLUTION INTRAVENOUS at 08:42

## 2022-01-01 RX ADMIN — ISOSORBIDE DINITRATE 20 MG: 10 TABLET ORAL at 14:00

## 2022-01-01 RX ADMIN — SODIUM CHLORIDE, PRESERVATIVE FREE 10 ML: 5 INJECTION INTRAVENOUS at 13:36

## 2022-01-01 RX ADMIN — BUMETANIDE 1 MG: 1 TABLET ORAL at 21:01

## 2022-01-01 RX ADMIN — CEFTRIAXONE SODIUM 1000 MG: 1 INJECTION, POWDER, FOR SOLUTION INTRAMUSCULAR; INTRAVENOUS at 13:48

## 2022-01-01 RX ADMIN — OXYCODONE HYDROCHLORIDE AND ACETAMINOPHEN 1 TABLET: 5; 325 TABLET ORAL at 09:19

## 2022-01-01 RX ADMIN — MYCOPHENOLATE MOFETIL 300 MG: 500 TABLET ORAL at 20:21

## 2022-01-01 RX ADMIN — FERROUS SULFATE TAB EC 325 MG (65 MG FE EQUIVALENT) 325 MG: 325 (65 FE) TABLET DELAYED RESPONSE at 17:10

## 2022-01-01 RX ADMIN — PREDNISONE 40 MG: 20 TABLET ORAL at 17:18

## 2022-01-01 RX ADMIN — LORAZEPAM 1 MG: 2 INJECTION INTRAMUSCULAR at 18:36

## 2022-01-01 RX ADMIN — HYDRALAZINE HYDROCHLORIDE 25 MG: 25 TABLET ORAL at 22:28

## 2022-01-01 RX ADMIN — MYCOPHENOLATE MOFETIL 300 MG: 500 TABLET ORAL at 21:20

## 2022-01-01 RX ADMIN — POTASSIUM CHLORIDE 20 MEQ: 20 TABLET, EXTENDED RELEASE ORAL at 17:18

## 2022-01-01 RX ADMIN — INSULIN LISPRO 9 UNITS: 100 INJECTION, SOLUTION INTRAVENOUS; SUBCUTANEOUS at 19:26

## 2022-01-01 RX ADMIN — HYDRALAZINE HYDROCHLORIDE 25 MG: 25 TABLET ORAL at 14:18

## 2022-01-01 RX ADMIN — LINEZOLID 600 MG: 600 INJECTION, SOLUTION INTRAVENOUS at 10:43

## 2022-01-01 RX ADMIN — OXYCODONE HYDROCHLORIDE AND ACETAMINOPHEN 1 TABLET: 5; 325 TABLET ORAL at 04:32

## 2022-01-01 RX ADMIN — REMDESIVIR 200 MG: 100 INJECTION, POWDER, LYOPHILIZED, FOR SOLUTION INTRAVENOUS at 12:26

## 2022-01-01 RX ADMIN — BUDESONIDE AND FORMOTEROL FUMARATE DIHYDRATE 2 PUFF: 160; 4.5 AEROSOL RESPIRATORY (INHALATION) at 11:07

## 2022-01-01 RX ADMIN — PANTOPRAZOLE SODIUM 40 MG: 40 TABLET, DELAYED RELEASE ORAL at 08:24

## 2022-01-01 RX ADMIN — OXYCODONE HYDROCHLORIDE AND ACETAMINOPHEN 1 TABLET: 5; 325 TABLET ORAL at 00:32

## 2022-01-01 RX ADMIN — POTASSIUM BICARBONATE 40 MEQ: 782 TABLET, EFFERVESCENT ORAL at 14:40

## 2022-01-01 RX ADMIN — BUDESONIDE AND FORMOTEROL FUMARATE DIHYDRATE 2 PUFF: 160; 4.5 AEROSOL RESPIRATORY (INHALATION) at 08:39

## 2022-01-01 RX ADMIN — FAMOTIDINE 20 MG: 20 TABLET, FILM COATED ORAL at 20:42

## 2022-01-01 RX ADMIN — INSULIN LISPRO 8 UNITS: 100 INJECTION, SOLUTION INTRAVENOUS; SUBCUTANEOUS at 07:56

## 2022-01-01 RX ADMIN — SODIUM CHLORIDE, PRESERVATIVE FREE 10 ML: 5 INJECTION INTRAVENOUS at 22:18

## 2022-01-01 RX ADMIN — MEROPENEM 1000 MG: 1 INJECTION, POWDER, FOR SOLUTION INTRAVENOUS at 01:19

## 2022-01-01 RX ADMIN — Medication 100 MCG/HR: at 06:09

## 2022-01-01 RX ADMIN — OXYCODONE HYDROCHLORIDE AND ACETAMINOPHEN 2 TABLET: 5; 325 TABLET ORAL at 16:39

## 2022-01-01 RX ADMIN — FERROUS SULFATE TAB EC 325 MG (65 MG FE EQUIVALENT) 325 MG: 325 (65 FE) TABLET DELAYED RESPONSE at 07:59

## 2022-01-01 RX ADMIN — ONDANSETRON 4 MG: 2 INJECTION INTRAMUSCULAR; INTRAVENOUS at 23:50

## 2022-01-01 RX ADMIN — GABAPENTIN 200 MG: 100 CAPSULE ORAL at 17:07

## 2022-01-01 RX ADMIN — ALBUTEROL SULFATE 5 MG: 5 SOLUTION RESPIRATORY (INHALATION) at 02:07

## 2022-01-01 RX ADMIN — INSULIN LISPRO 4 UNITS: 100 INJECTION, SOLUTION INTRAVENOUS; SUBCUTANEOUS at 12:00

## 2022-01-01 RX ADMIN — PROPOFOL 25 MCG/KG/MIN: 10 INJECTION, EMULSION INTRAVENOUS at 05:32

## 2022-01-01 RX ADMIN — DESMOPRESSIN ACETATE 40 MG: 0.2 TABLET ORAL at 22:33

## 2022-01-01 RX ADMIN — HEPARIN SODIUM 5000 UNITS: 5000 INJECTION INTRAVENOUS; SUBCUTANEOUS at 21:36

## 2022-01-01 RX ADMIN — HYDRALAZINE HYDROCHLORIDE 25 MG: 25 TABLET ORAL at 05:20

## 2022-01-01 RX ADMIN — GUAIFENESIN 600 MG: 600 TABLET, EXTENDED RELEASE ORAL at 21:07

## 2022-01-01 RX ADMIN — PANTOPRAZOLE SODIUM 40 MG: 40 TABLET, DELAYED RELEASE ORAL at 06:24

## 2022-01-01 RX ADMIN — HYDRALAZINE HYDROCHLORIDE 25 MG: 25 TABLET ORAL at 14:34

## 2022-01-01 RX ADMIN — MYCOPHENOLATE MOFETIL 300 MG: 500 TABLET ORAL at 22:18

## 2022-01-01 RX ADMIN — SODIUM CHLORIDE, PRESERVATIVE FREE 10 ML: 5 INJECTION INTRAVENOUS at 08:59

## 2022-01-01 RX ADMIN — Medication 7 MG/HR: at 21:13

## 2022-01-01 RX ADMIN — PREDNISONE 20 MG: 20 TABLET ORAL at 08:57

## 2022-01-01 RX ADMIN — INSULIN LISPRO 1 UNITS: 100 INJECTION, SOLUTION INTRAVENOUS; SUBCUTANEOUS at 21:11

## 2022-01-01 RX ADMIN — FUROSEMIDE 80 MG: 10 INJECTION, SOLUTION INTRAMUSCULAR; INTRAVENOUS at 17:45

## 2022-01-01 RX ADMIN — MYCOPHENOLATE MOFETIL 300 MG: 500 TABLET ORAL at 20:07

## 2022-01-01 RX ADMIN — INSULIN LISPRO 1 UNITS: 100 INJECTION, SOLUTION INTRAVENOUS; SUBCUTANEOUS at 21:01

## 2022-01-01 RX ADMIN — MIDODRINE HYDROCHLORIDE 5 MG: 5 TABLET ORAL at 16:27

## 2022-01-01 RX ADMIN — CEFTRIAXONE SODIUM 1000 MG: 1 INJECTION, POWDER, FOR SOLUTION INTRAMUSCULAR; INTRAVENOUS at 21:20

## 2022-01-01 RX ADMIN — HYDRALAZINE HYDROCHLORIDE 25 MG: 25 TABLET ORAL at 05:34

## 2022-01-01 RX ADMIN — HYDRALAZINE HYDROCHLORIDE 25 MG: 25 TABLET ORAL at 14:00

## 2022-01-01 RX ADMIN — MYCOPHENOLATE MOFETIL 300 MG: 500 TABLET ORAL at 09:11

## 2022-01-01 RX ADMIN — GABAPENTIN 400 MG: 400 CAPSULE ORAL at 08:32

## 2022-01-01 RX ADMIN — OXYCODONE HYDROCHLORIDE AND ACETAMINOPHEN 2 TABLET: 5; 325 TABLET ORAL at 21:08

## 2022-01-01 RX ADMIN — SERTRALINE 100 MG: 50 TABLET, FILM COATED ORAL at 08:05

## 2022-01-01 RX ADMIN — IPRATROPIUM BROMIDE AND ALBUTEROL SULFATE 1 AMPULE: .5; 3 SOLUTION RESPIRATORY (INHALATION) at 12:15

## 2022-01-01 RX ADMIN — DOCUSATE SODIUM 100 MG: 100 CAPSULE ORAL at 20:01

## 2022-01-01 RX ADMIN — Medication 1 CAPSULE: at 09:10

## 2022-01-01 RX ADMIN — INSULIN LISPRO 12 UNITS: 100 INJECTION, SOLUTION INTRAVENOUS; SUBCUTANEOUS at 16:54

## 2022-01-01 RX ADMIN — Medication 100 MCG/HR: at 20:08

## 2022-01-01 RX ADMIN — BISACODYL 10 MG: 10 SUPPOSITORY RECTAL at 16:21

## 2022-01-01 RX ADMIN — ALBUTEROL SULFATE 2.5 MG: 2.5 SOLUTION RESPIRATORY (INHALATION) at 16:24

## 2022-01-01 RX ADMIN — TIOTROPIUM BROMIDE INHALATION SPRAY 2 PUFF: 3.12 SPRAY, METERED RESPIRATORY (INHALATION) at 08:14

## 2022-01-01 RX ADMIN — HEPARIN SODIUM 5000 UNITS: 5000 INJECTION INTRAVENOUS; SUBCUTANEOUS at 14:12

## 2022-01-01 RX ADMIN — SERTRALINE 100 MG: 50 TABLET, FILM COATED ORAL at 08:57

## 2022-01-01 RX ADMIN — MYCOPHENOLATE MOFETIL 300 MG: 500 TABLET ORAL at 09:30

## 2022-01-01 RX ADMIN — TIOTROPIUM BROMIDE INHALATION SPRAY 2 PUFF: 3.12 SPRAY, METERED RESPIRATORY (INHALATION) at 08:30

## 2022-01-01 RX ADMIN — PIPERACILLIN AND TAZOBACTAM 4500 MG: 4; .5 INJECTION, POWDER, LYOPHILIZED, FOR SOLUTION INTRAVENOUS; PARENTERAL at 17:22

## 2022-01-01 RX ADMIN — INSULIN LISPRO 6 UNITS: 100 INJECTION, SOLUTION INTRAVENOUS; SUBCUTANEOUS at 13:01

## 2022-01-01 RX ADMIN — ALBUTEROL SULFATE 2.5 MG: 2.5 SOLUTION RESPIRATORY (INHALATION) at 12:26

## 2022-01-01 RX ADMIN — INSULIN LISPRO 4 UNITS: 100 INJECTION, SOLUTION INTRAVENOUS; SUBCUTANEOUS at 17:07

## 2022-01-01 RX ADMIN — ENOXAPARIN SODIUM 40 MG: 40 INJECTION SUBCUTANEOUS at 20:28

## 2022-01-01 RX ADMIN — PANTOPRAZOLE SODIUM 40 MG: 40 TABLET, DELAYED RELEASE ORAL at 05:31

## 2022-01-01 RX ADMIN — SPIRONOLACTONE 25 MG: 25 TABLET ORAL at 09:11

## 2022-01-01 RX ADMIN — HYDRALAZINE HYDROCHLORIDE 25 MG: 25 TABLET ORAL at 13:52

## 2022-01-01 RX ADMIN — HEPARIN SODIUM 5000 UNITS: 5000 INJECTION INTRAVENOUS; SUBCUTANEOUS at 17:16

## 2022-01-01 RX ADMIN — BUMETANIDE 2 MG: 0.25 INJECTION, SOLUTION INTRAMUSCULAR; INTRAVENOUS at 13:36

## 2022-01-01 RX ADMIN — GUAIFENESIN 600 MG: 600 TABLET, EXTENDED RELEASE ORAL at 20:28

## 2022-01-01 RX ADMIN — ISOSORBIDE DINITRATE 20 MG: 10 TABLET ORAL at 09:58

## 2022-01-01 RX ADMIN — MYCOPHENOLATE MOFETIL 300 MG: 500 TABLET ORAL at 21:47

## 2022-01-01 RX ADMIN — SERTRALINE 100 MG: 50 TABLET, FILM COATED ORAL at 08:01

## 2022-01-01 RX ADMIN — PROPOFOL 25 MCG/KG/MIN: 10 INJECTION, EMULSION INTRAVENOUS at 00:45

## 2022-01-01 RX ADMIN — DIAZEPAM 5 MG: 5 TABLET ORAL at 00:32

## 2022-01-01 RX ADMIN — IPRATROPIUM BROMIDE AND ALBUTEROL SULFATE 1 AMPULE: .5; 3 SOLUTION RESPIRATORY (INHALATION) at 08:29

## 2022-01-01 RX ADMIN — ALBUTEROL SULFATE 2.5 MG: 2.5 SOLUTION RESPIRATORY (INHALATION) at 11:09

## 2022-01-01 RX ADMIN — HYDRALAZINE HYDROCHLORIDE 25 MG: 25 TABLET ORAL at 05:38

## 2022-01-01 RX ADMIN — INSULIN LISPRO 6 UNITS: 100 INJECTION, SOLUTION INTRAVENOUS; SUBCUTANEOUS at 08:32

## 2022-01-01 RX ADMIN — GUAIFENESIN 600 MG: 600 TABLET, EXTENDED RELEASE ORAL at 19:30

## 2022-01-01 RX ADMIN — GABAPENTIN 300 MG: 600 TABLET ORAL at 16:11

## 2022-01-01 RX ADMIN — PREDNISONE 20 MG: 20 TABLET ORAL at 15:55

## 2022-01-01 RX ADMIN — INSULIN LISPRO 9 UNITS: 100 INJECTION, SOLUTION INTRAVENOUS; SUBCUTANEOUS at 12:17

## 2022-01-01 RX ADMIN — SODIUM CHLORIDE, PRESERVATIVE FREE 10 ML: 5 INJECTION INTRAVENOUS at 21:30

## 2022-01-01 RX ADMIN — OXYCODONE HYDROCHLORIDE AND ACETAMINOPHEN 1 TABLET: 5; 325 TABLET ORAL at 22:15

## 2022-01-01 RX ADMIN — PANTOPRAZOLE SODIUM 40 MG: 40 TABLET, DELAYED RELEASE ORAL at 09:58

## 2022-01-01 RX ADMIN — ALBUTEROL SULFATE 2.5 MG: 2.5 SOLUTION RESPIRATORY (INHALATION) at 15:17

## 2022-01-01 RX ADMIN — HYDRALAZINE HYDROCHLORIDE 25 MG: 25 TABLET ORAL at 20:25

## 2022-01-01 RX ADMIN — Medication 100 MCG/HR: at 11:04

## 2022-01-01 RX ADMIN — ALBUTEROL SULFATE 2.5 MG: 2.5 SOLUTION RESPIRATORY (INHALATION) at 19:48

## 2022-01-01 RX ADMIN — INSULIN LISPRO 6 UNITS: 100 INJECTION, SOLUTION INTRAVENOUS; SUBCUTANEOUS at 00:21

## 2022-01-01 RX ADMIN — SODIUM CHLORIDE, PRESERVATIVE FREE 10 ML: 5 INJECTION INTRAVENOUS at 20:15

## 2022-01-01 RX ADMIN — SODIUM CHLORIDE, PRESERVATIVE FREE 10 ML: 5 INJECTION INTRAVENOUS at 21:02

## 2022-01-01 RX ADMIN — FERROUS SULFATE TAB EC 325 MG (65 MG FE EQUIVALENT) 325 MG: 325 (65 FE) TABLET DELAYED RESPONSE at 17:18

## 2022-01-01 RX ADMIN — INSULIN LISPRO 4 UNITS: 100 INJECTION, SOLUTION INTRAVENOUS; SUBCUTANEOUS at 09:37

## 2022-01-01 RX ADMIN — ENOXAPARIN SODIUM 40 MG: 40 INJECTION SUBCUTANEOUS at 21:22

## 2022-01-01 RX ADMIN — LEVOFLOXACIN 750 MG: 5 INJECTION, SOLUTION INTRAVENOUS at 13:22

## 2022-01-01 RX ADMIN — ALBUTEROL SULFATE 2.5 MG: 2.5 SOLUTION RESPIRATORY (INHALATION) at 19:30

## 2022-01-01 RX ADMIN — FOLIC ACID 1 MG: 1 TABLET ORAL at 08:09

## 2022-01-01 RX ADMIN — LORAZEPAM 1 MG: 2 INJECTION INTRAMUSCULAR at 00:08

## 2022-01-01 RX ADMIN — HEPARIN SODIUM 5000 UNITS: 5000 INJECTION INTRAVENOUS; SUBCUTANEOUS at 22:33

## 2022-01-01 RX ADMIN — GUAIFENESIN 600 MG: 600 TABLET, EXTENDED RELEASE ORAL at 20:37

## 2022-01-01 RX ADMIN — SPIRONOLACTONE 25 MG: 25 TABLET ORAL at 08:25

## 2022-01-01 RX ADMIN — NITROGLYCERIN 0.4 MG: 0.4 TABLET SUBLINGUAL at 06:35

## 2022-01-01 RX ADMIN — INSULIN GLARGINE 20 UNITS: 100 INJECTION, SOLUTION SUBCUTANEOUS at 22:25

## 2022-01-01 RX ADMIN — OXYCODONE HYDROCHLORIDE 5 MG: 5 SOLUTION ORAL at 09:04

## 2022-01-01 RX ADMIN — SPIRONOLACTONE 25 MG: 25 TABLET ORAL at 08:57

## 2022-01-01 RX ADMIN — HEPARIN SODIUM 5000 UNITS: 5000 INJECTION INTRAVENOUS; SUBCUTANEOUS at 22:19

## 2022-01-01 RX ADMIN — ISOSORBIDE DINITRATE 20 MG: 10 TABLET ORAL at 08:03

## 2022-01-01 RX ADMIN — FERROUS SULFATE TAB EC 325 MG (65 MG FE EQUIVALENT) 325 MG: 325 (65 FE) TABLET DELAYED RESPONSE at 08:36

## 2022-01-01 RX ADMIN — SODIUM CHLORIDE 25 ML/HR: 9 INJECTION, SOLUTION INTRAVENOUS at 02:27

## 2022-01-01 RX ADMIN — CISATRACURIUM BESYLATE 2 MCG/KG/MIN: 10 INJECTION, SOLUTION INTRAVENOUS at 19:59

## 2022-01-01 RX ADMIN — IPRATROPIUM BROMIDE AND ALBUTEROL SULFATE 1 AMPULE: .5; 3 SOLUTION RESPIRATORY (INHALATION) at 15:33

## 2022-01-01 RX ADMIN — GABAPENTIN 300 MG: 600 TABLET ORAL at 14:27

## 2022-01-01 RX ADMIN — SODIUM CHLORIDE, POTASSIUM CHLORIDE, SODIUM LACTATE AND CALCIUM CHLORIDE: 600; 310; 30; 20 INJECTION, SOLUTION INTRAVENOUS at 08:18

## 2022-01-01 RX ADMIN — IPRATROPIUM BROMIDE AND ALBUTEROL SULFATE 1 AMPULE: .5; 3 SOLUTION RESPIRATORY (INHALATION) at 16:24

## 2022-01-01 RX ADMIN — ISOSORBIDE DINITRATE 20 MG: 10 TABLET ORAL at 12:01

## 2022-01-01 RX ADMIN — Medication 20 MCG/MIN: at 23:01

## 2022-01-01 RX ADMIN — HEPARIN SODIUM 5000 UNITS: 5000 INJECTION INTRAVENOUS; SUBCUTANEOUS at 05:34

## 2022-01-01 RX ADMIN — POTASSIUM BICARBONATE 40 MEQ: 782 TABLET, EFFERVESCENT ORAL at 05:26

## 2022-01-01 RX ADMIN — ISOSORBIDE DINITRATE 20 MG: 10 TABLET ORAL at 13:29

## 2022-01-01 RX ADMIN — FUROSEMIDE 40 MG: 10 INJECTION, SOLUTION INTRAMUSCULAR; INTRAVENOUS at 17:50

## 2022-01-01 RX ADMIN — OXYCODONE HYDROCHLORIDE AND ACETAMINOPHEN 1 TABLET: 5; 325 TABLET ORAL at 10:19

## 2022-01-01 RX ADMIN — SODIUM CHLORIDE, PRESERVATIVE FREE 10 ML: 5 INJECTION INTRAVENOUS at 19:30

## 2022-01-01 RX ADMIN — INSULIN LISPRO 4 UNITS: 100 INJECTION, SOLUTION INTRAVENOUS; SUBCUTANEOUS at 12:17

## 2022-01-01 RX ADMIN — MAGNESIUM SULFATE HEPTAHYDRATE 2000 MG: 40 INJECTION, SOLUTION INTRAVENOUS at 18:53

## 2022-01-01 RX ADMIN — CISATRACURIUM BESYLATE 1.5 MCG/KG/MIN: 10 INJECTION, SOLUTION INTRAVENOUS at 08:11

## 2022-01-01 RX ADMIN — HYDRALAZINE HYDROCHLORIDE 25 MG: 25 TABLET ORAL at 20:32

## 2022-01-01 RX ADMIN — MIDODRINE HYDROCHLORIDE 5 MG: 5 TABLET ORAL at 17:02

## 2022-01-01 RX ADMIN — INSULIN LISPRO 2 UNITS: 100 INJECTION, SOLUTION INTRAVENOUS; SUBCUTANEOUS at 20:30

## 2022-01-01 RX ADMIN — POTASSIUM CHLORIDE 40 MEQ: 20 TABLET, EXTENDED RELEASE ORAL at 08:42

## 2022-01-01 RX ADMIN — ENOXAPARIN SODIUM 30 MG: 100 INJECTION SUBCUTANEOUS at 08:31

## 2022-01-01 RX ADMIN — TIOTROPIUM BROMIDE INHALATION SPRAY 2 PUFF: 3.12 SPRAY, METERED RESPIRATORY (INHALATION) at 08:25

## 2022-01-01 RX ADMIN — HYDRALAZINE HYDROCHLORIDE 25 MG: 25 TABLET ORAL at 20:28

## 2022-01-01 RX ADMIN — INSULIN LISPRO 2 UNITS: 100 INJECTION, SOLUTION INTRAVENOUS; SUBCUTANEOUS at 21:08

## 2022-01-01 RX ADMIN — PREDNISONE 20 MG: 20 TABLET ORAL at 08:25

## 2022-01-01 RX ADMIN — OXYCODONE HYDROCHLORIDE AND ACETAMINOPHEN 2 TABLET: 5; 325 TABLET ORAL at 20:29

## 2022-01-01 RX ADMIN — BUDESONIDE AND FORMOTEROL FUMARATE DIHYDRATE 2 PUFF: 160; 4.5 AEROSOL RESPIRATORY (INHALATION) at 08:58

## 2022-01-01 RX ADMIN — HEPARIN SODIUM 5000 UNITS: 5000 INJECTION INTRAVENOUS; SUBCUTANEOUS at 21:04

## 2022-01-01 RX ADMIN — MYCOPHENOLATE MOFETIL 300 MG: 500 TABLET ORAL at 15:38

## 2022-01-01 RX ADMIN — HYDRALAZINE HYDROCHLORIDE 25 MG: 25 TABLET ORAL at 05:33

## 2022-01-01 RX ADMIN — AMLODIPINE BESYLATE 5 MG: 5 TABLET ORAL at 07:57

## 2022-01-01 RX ADMIN — ONDANSETRON 4 MG: 2 INJECTION INTRAMUSCULAR; INTRAVENOUS at 19:49

## 2022-01-01 RX ADMIN — MYCOPHENOLATE MOFETIL 300 MG: 500 TABLET ORAL at 07:57

## 2022-01-01 RX ADMIN — OXYCODONE HYDROCHLORIDE AND ACETAMINOPHEN 1 TABLET: 5; 325 TABLET ORAL at 23:27

## 2022-01-01 RX ADMIN — PANTOPRAZOLE SODIUM 40 MG: 40 TABLET, DELAYED RELEASE ORAL at 08:57

## 2022-01-01 RX ADMIN — HEPARIN SODIUM 5000 UNITS: 5000 INJECTION INTRAVENOUS; SUBCUTANEOUS at 14:17

## 2022-01-01 RX ADMIN — SERTRALINE 100 MG: 50 TABLET, FILM COATED ORAL at 07:59

## 2022-01-01 RX ADMIN — ALBUTEROL SULFATE 2.5 MG: 2.5 SOLUTION RESPIRATORY (INHALATION) at 16:02

## 2022-01-01 RX ADMIN — FLUTICASONE PROPIONATE 1 SPRAY: 50 SPRAY, METERED NASAL at 08:47

## 2022-01-01 RX ADMIN — LORAZEPAM 1 MG: 2 INJECTION INTRAMUSCULAR at 14:28

## 2022-01-01 RX ADMIN — BUDESONIDE AND FORMOTEROL FUMARATE DIHYDRATE 2 PUFF: 160; 4.5 AEROSOL RESPIRATORY (INHALATION) at 20:15

## 2022-01-01 RX ADMIN — PREDNISONE 40 MG: 20 TABLET ORAL at 09:18

## 2022-01-01 RX ADMIN — HYDRALAZINE HYDROCHLORIDE 25 MG: 25 TABLET ORAL at 21:53

## 2022-01-01 RX ADMIN — MAGNESIUM SULFATE 2000 MG: 2 INJECTION INTRAVENOUS at 06:26

## 2022-01-01 RX ADMIN — OXYCODONE HYDROCHLORIDE AND ACETAMINOPHEN 2 TABLET: 5; 325 TABLET ORAL at 08:27

## 2022-01-01 RX ADMIN — LEVOFLOXACIN 750 MG: 750 TABLET, FILM COATED ORAL at 07:45

## 2022-01-01 RX ADMIN — IPRATROPIUM BROMIDE AND ALBUTEROL SULFATE 1 AMPULE: .5; 3 SOLUTION RESPIRATORY (INHALATION) at 15:06

## 2022-01-01 RX ADMIN — MYCOPHENOLATE MOFETIL 300 MG: 500 TABLET ORAL at 14:00

## 2022-01-01 RX ADMIN — SODIUM CHLORIDE, PRESERVATIVE FREE 10 ML: 5 INJECTION INTRAVENOUS at 09:23

## 2022-01-01 RX ADMIN — SERTRALINE 100 MG: 50 TABLET, FILM COATED ORAL at 08:54

## 2022-01-01 RX ADMIN — GABAPENTIN 300 MG: 600 TABLET ORAL at 08:56

## 2022-01-01 RX ADMIN — FUROSEMIDE 40 MG: 10 INJECTION, SOLUTION INTRAMUSCULAR; INTRAVENOUS at 17:17

## 2022-01-01 RX ADMIN — SODIUM CHLORIDE, PRESERVATIVE FREE 10 ML: 5 INJECTION INTRAVENOUS at 21:21

## 2022-01-01 RX ADMIN — INSULIN LISPRO 6 UNITS: 100 INJECTION, SOLUTION INTRAVENOUS; SUBCUTANEOUS at 16:30

## 2022-01-01 RX ADMIN — SODIUM CHLORIDE, PRESERVATIVE FREE 20 MG: 5 INJECTION INTRAVENOUS at 08:45

## 2022-01-01 RX ADMIN — HEPARIN SODIUM 5000 UNITS: 5000 INJECTION INTRAVENOUS; SUBCUTANEOUS at 05:06

## 2022-01-01 RX ADMIN — FUROSEMIDE 40 MG: 10 INJECTION, SOLUTION INTRAMUSCULAR; INTRAVENOUS at 17:47

## 2022-01-01 RX ADMIN — DIAZEPAM 5 MG: 5 TABLET ORAL at 22:58

## 2022-01-01 RX ADMIN — BUMETANIDE 2 MG: 0.25 INJECTION INTRAMUSCULAR; INTRAVENOUS at 00:19

## 2022-01-01 RX ADMIN — POTASSIUM CHLORIDE 40 MEQ: 1500 TABLET, EXTENDED RELEASE ORAL at 16:38

## 2022-01-01 RX ADMIN — AMLODIPINE BESYLATE 5 MG: 5 TABLET ORAL at 08:31

## 2022-01-01 RX ADMIN — ALBUTEROL SULFATE 2.5 MG: 2.5 SOLUTION RESPIRATORY (INHALATION) at 15:27

## 2022-01-01 RX ADMIN — INSULIN LISPRO 1 UNITS: 100 INJECTION, SOLUTION INTRAVENOUS; SUBCUTANEOUS at 12:23

## 2022-01-01 RX ADMIN — OXYCODONE HYDROCHLORIDE AND ACETAMINOPHEN 1 TABLET: 5; 325 TABLET ORAL at 17:13

## 2022-01-01 RX ADMIN — BUDESONIDE AND FORMOTEROL FUMARATE DIHYDRATE 2 PUFF: 160; 4.5 AEROSOL RESPIRATORY (INHALATION) at 08:14

## 2022-01-01 RX ADMIN — FLUTICASONE PROPIONATE 1 SPRAY: 50 SPRAY, METERED NASAL at 08:16

## 2022-01-01 RX ADMIN — ISOSORBIDE DINITRATE 20 MG: 10 TABLET ORAL at 16:12

## 2022-01-01 RX ADMIN — TIOTROPIUM BROMIDE INHALATION SPRAY 2 PUFF: 3.12 SPRAY, METERED RESPIRATORY (INHALATION) at 11:07

## 2022-01-01 RX ADMIN — DIAZEPAM 5 MG: 5 TABLET ORAL at 16:48

## 2022-01-01 RX ADMIN — GABAPENTIN 300 MG: 600 TABLET ORAL at 08:51

## 2022-01-01 RX ADMIN — HYDRALAZINE HYDROCHLORIDE 25 MG: 25 TABLET ORAL at 13:20

## 2022-01-01 RX ADMIN — FOLIC ACID 1 MG: 1 TABLET ORAL at 08:46

## 2022-01-01 RX ADMIN — ENOXAPARIN SODIUM 30 MG: 100 INJECTION SUBCUTANEOUS at 21:07

## 2022-01-01 RX ADMIN — AMLODIPINE BESYLATE 5 MG: 5 TABLET ORAL at 08:41

## 2022-01-01 RX ADMIN — HYDRALAZINE HYDROCHLORIDE 25 MG: 25 TABLET ORAL at 13:41

## 2022-01-01 RX ADMIN — Medication 1000 MCG: at 08:09

## 2022-01-01 RX ADMIN — FERROUS SULFATE TAB EC 325 MG (65 MG FE EQUIVALENT) 325 MG: 325 (65 FE) TABLET DELAYED RESPONSE at 08:08

## 2022-01-01 RX ADMIN — ISOSORBIDE DINITRATE 20 MG: 10 TABLET ORAL at 18:38

## 2022-01-01 RX ADMIN — DOCUSATE SODIUM 100 MG: 100 CAPSULE ORAL at 23:15

## 2022-01-01 RX ADMIN — INSULIN LISPRO 4 UNITS: 100 INJECTION, SOLUTION INTRAVENOUS; SUBCUTANEOUS at 16:21

## 2022-01-01 RX ADMIN — MYCOPHENOLATE MOFETIL 300 MG: 500 TABLET ORAL at 08:08

## 2022-01-01 RX ADMIN — INSULIN LISPRO 12 UNITS: 100 INJECTION, SOLUTION INTRAVENOUS; SUBCUTANEOUS at 00:03

## 2022-01-01 RX ADMIN — ISOSORBIDE DINITRATE 20 MG: 10 TABLET ORAL at 14:18

## 2022-01-01 RX ADMIN — ISOSORBIDE DINITRATE 20 MG: 10 TABLET ORAL at 17:47

## 2022-01-01 RX ADMIN — GABAPENTIN 300 MG: 600 TABLET ORAL at 20:44

## 2022-01-01 RX ADMIN — IPRATROPIUM BROMIDE AND ALBUTEROL SULFATE 1 AMPULE: .5; 3 SOLUTION RESPIRATORY (INHALATION) at 19:58

## 2022-01-01 RX ADMIN — SODIUM CHLORIDE, PRESERVATIVE FREE 10 ML: 5 INJECTION INTRAVENOUS at 08:02

## 2022-01-01 RX ADMIN — MIDODRINE HYDROCHLORIDE 5 MG: 5 TABLET ORAL at 12:16

## 2022-01-01 RX ADMIN — MIDODRINE HYDROCHLORIDE 5 MG: 5 TABLET ORAL at 09:53

## 2022-01-01 RX ADMIN — BUMETANIDE 1 MG: 1 TABLET ORAL at 20:28

## 2022-01-01 RX ADMIN — GUAIFENESIN 600 MG: 600 TABLET, EXTENDED RELEASE ORAL at 08:58

## 2022-01-01 RX ADMIN — OXYCODONE HYDROCHLORIDE AND ACETAMINOPHEN 2 TABLET: 5; 325 TABLET ORAL at 20:21

## 2022-01-01 RX ADMIN — BUMETANIDE 2 MG: 1 TABLET ORAL at 08:25

## 2022-01-01 RX ADMIN — BUDESONIDE AND FORMOTEROL FUMARATE DIHYDRATE 2 PUFF: 160; 4.5 AEROSOL RESPIRATORY (INHALATION) at 19:30

## 2022-01-01 RX ADMIN — BUDESONIDE AND FORMOTEROL FUMARATE DIHYDRATE 2 PUFF: 160; 4.5 AEROSOL RESPIRATORY (INHALATION) at 10:43

## 2022-01-01 RX ADMIN — GABAPENTIN 400 MG: 400 CAPSULE ORAL at 08:34

## 2022-01-01 RX ADMIN — INSULIN LISPRO 9 UNITS: 100 INJECTION, SOLUTION INTRAVENOUS; SUBCUTANEOUS at 20:04

## 2022-01-01 RX ADMIN — OXYCODONE HYDROCHLORIDE 5 MG: 5 SOLUTION ORAL at 22:25

## 2022-01-01 RX ADMIN — OXYCODONE HYDROCHLORIDE AND ACETAMINOPHEN 1 TABLET: 5; 325 TABLET ORAL at 20:52

## 2022-01-01 RX ADMIN — IPRATROPIUM BROMIDE AND ALBUTEROL SULFATE 1 AMPULE: .5; 3 SOLUTION RESPIRATORY (INHALATION) at 12:49

## 2022-01-01 RX ADMIN — HEPARIN SODIUM 5000 UNITS: 5000 INJECTION INTRAVENOUS; SUBCUTANEOUS at 14:28

## 2022-01-01 RX ADMIN — GABAPENTIN 300 MG: 600 TABLET ORAL at 08:05

## 2022-01-01 RX ADMIN — ALBUTEROL SULFATE 2.5 MG: 2.5 SOLUTION RESPIRATORY (INHALATION) at 20:53

## 2022-01-01 RX ADMIN — HYDRALAZINE HYDROCHLORIDE 25 MG: 25 TABLET ORAL at 06:29

## 2022-01-01 RX ADMIN — PANTOPRAZOLE SODIUM 40 MG: 40 TABLET, DELAYED RELEASE ORAL at 06:57

## 2022-01-01 RX ADMIN — GABAPENTIN 400 MG: 400 CAPSULE ORAL at 21:30

## 2022-01-01 RX ADMIN — HEPARIN SODIUM 5000 UNITS: 5000 INJECTION INTRAVENOUS; SUBCUTANEOUS at 05:52

## 2022-01-01 RX ADMIN — CISATRACURIUM BESYLATE 2 MCG/KG/MIN: 10 INJECTION, SOLUTION INTRAVENOUS at 23:15

## 2022-01-01 RX ADMIN — IOPAMIDOL 85 ML: 755 INJECTION, SOLUTION INTRAVENOUS at 13:35

## 2022-01-01 RX ADMIN — FERROUS SULFATE TAB EC 325 MG (65 MG FE EQUIVALENT) 325 MG: 325 (65 FE) TABLET DELAYED RESPONSE at 16:59

## 2022-01-01 RX ADMIN — LORAZEPAM 1 MG: 2 INJECTION INTRAMUSCULAR at 00:11

## 2022-01-01 RX ADMIN — INSULIN LISPRO 6 UNITS: 100 INJECTION, SOLUTION INTRAVENOUS; SUBCUTANEOUS at 08:06

## 2022-01-01 RX ADMIN — GABAPENTIN 400 MG: 400 CAPSULE ORAL at 08:45

## 2022-01-01 RX ADMIN — MYCOPHENOLATE MOFETIL 300 MG: 500 TABLET ORAL at 14:18

## 2022-01-01 RX ADMIN — ENOXAPARIN SODIUM 30 MG: 100 INJECTION SUBCUTANEOUS at 19:59

## 2022-01-01 RX ADMIN — MYCOPHENOLATE MOFETIL 300 MG: 500 TABLET ORAL at 14:48

## 2022-01-01 RX ADMIN — LEVOFLOXACIN 750 MG: 5 INJECTION, SOLUTION INTRAVENOUS at 01:49

## 2022-01-01 RX ADMIN — FERROUS SULFATE TAB EC 325 MG (65 MG FE EQUIVALENT) 325 MG: 325 (65 FE) TABLET DELAYED RESPONSE at 16:37

## 2022-01-01 RX ADMIN — PROCHLORPERAZINE EDISYLATE 10 MG: 5 INJECTION INTRAMUSCULAR; INTRAVENOUS at 15:32

## 2022-01-01 RX ADMIN — BUMETANIDE 1 MG: 0.25 INJECTION, SOLUTION INTRAMUSCULAR; INTRAVENOUS at 08:39

## 2022-01-01 RX ADMIN — LEVOFLOXACIN 750 MG: 5 INJECTION, SOLUTION INTRAVENOUS at 08:37

## 2022-01-01 RX ADMIN — SODIUM CHLORIDE: 9 INJECTION, SOLUTION INTRAVENOUS at 10:20

## 2022-01-01 RX ADMIN — MYCOPHENOLATE MOFETIL 300 MG: 500 TABLET ORAL at 20:52

## 2022-01-01 RX ADMIN — PREDNISONE 40 MG: 20 TABLET ORAL at 08:58

## 2022-01-01 RX ADMIN — ALBUTEROL SULFATE 2.5 MG: 2.5 SOLUTION RESPIRATORY (INHALATION) at 19:41

## 2022-01-01 RX ADMIN — OXYCODONE HYDROCHLORIDE AND ACETAMINOPHEN 2 TABLET: 5; 325 TABLET ORAL at 08:58

## 2022-01-01 RX ADMIN — OXYCODONE HYDROCHLORIDE AND ACETAMINOPHEN 2 TABLET: 5; 325 TABLET ORAL at 05:46

## 2022-01-01 RX ADMIN — OXYCODONE HYDROCHLORIDE AND ACETAMINOPHEN 2 TABLET: 5; 325 TABLET ORAL at 08:59

## 2022-01-01 RX ADMIN — OXYCODONE HYDROCHLORIDE AND ACETAMINOPHEN 2 TABLET: 5; 325 TABLET ORAL at 22:36

## 2022-01-01 RX ADMIN — SPIRONOLACTONE 25 MG: 25 TABLET ORAL at 09:21

## 2022-01-01 RX ADMIN — ENOXAPARIN SODIUM 30 MG: 100 INJECTION SUBCUTANEOUS at 08:26

## 2022-01-01 RX ADMIN — ONDANSETRON 4 MG: 2 INJECTION INTRAMUSCULAR; INTRAVENOUS at 00:12

## 2022-01-01 RX ADMIN — TIOTROPIUM BROMIDE INHALATION SPRAY 2 PUFF: 3.12 SPRAY, METERED RESPIRATORY (INHALATION) at 08:43

## 2022-01-01 RX ADMIN — DESMOPRESSIN ACETATE 40 MG: 0.2 TABLET ORAL at 20:28

## 2022-01-01 RX ADMIN — PROPOFOL 50 MCG/KG/MIN: 10 INJECTION, EMULSION INTRAVENOUS at 09:29

## 2022-01-01 RX ADMIN — FERROUS SULFATE TAB EC 325 MG (65 MG FE EQUIVALENT) 325 MG: 325 (65 FE) TABLET DELAYED RESPONSE at 18:54

## 2022-01-01 RX ADMIN — PANTOPRAZOLE SODIUM 40 MG: 40 TABLET, DELAYED RELEASE ORAL at 08:01

## 2022-01-01 RX ADMIN — SERTRALINE 100 MG: 50 TABLET, FILM COATED ORAL at 09:16

## 2022-01-01 RX ADMIN — FUROSEMIDE 60 MG: 10 INJECTION, SOLUTION INTRAMUSCULAR; INTRAVENOUS at 11:10

## 2022-01-01 RX ADMIN — HEPARIN SODIUM 5000 UNITS: 5000 INJECTION INTRAVENOUS; SUBCUTANEOUS at 14:01

## 2022-01-01 RX ADMIN — HEPARIN SODIUM 5000 UNITS: 5000 INJECTION INTRAVENOUS; SUBCUTANEOUS at 12:31

## 2022-01-01 RX ADMIN — SODIUM CHLORIDE, PRESERVATIVE FREE 10 ML: 5 INJECTION INTRAVENOUS at 08:55

## 2022-01-01 RX ADMIN — PANTOPRAZOLE SODIUM 40 MG: 40 TABLET, DELAYED RELEASE ORAL at 06:29

## 2022-01-01 RX ADMIN — GUAIFENESIN 600 MG: 600 TABLET, EXTENDED RELEASE ORAL at 20:21

## 2022-01-01 RX ADMIN — OXYCODONE HYDROCHLORIDE 5 MG: 5 SOLUTION ORAL at 12:02

## 2022-01-01 RX ADMIN — INSULIN LISPRO 4 UNITS: 100 INJECTION, SOLUTION INTRAVENOUS; SUBCUTANEOUS at 11:56

## 2022-01-01 RX ADMIN — ENOXAPARIN SODIUM 30 MG: 100 INJECTION SUBCUTANEOUS at 21:00

## 2022-01-01 RX ADMIN — ISOSORBIDE DINITRATE 20 MG: 10 TABLET ORAL at 22:16

## 2022-01-01 RX ADMIN — BUDESONIDE AND FORMOTEROL FUMARATE DIHYDRATE 2 PUFF: 160; 4.5 AEROSOL RESPIRATORY (INHALATION) at 09:06

## 2022-01-01 RX ADMIN — IPRATROPIUM BROMIDE AND ALBUTEROL SULFATE 1 AMPULE: .5; 3 SOLUTION RESPIRATORY (INHALATION) at 12:36

## 2022-01-01 RX ADMIN — HYDRALAZINE HYDROCHLORIDE 25 MG: 25 TABLET ORAL at 06:39

## 2022-01-01 RX ADMIN — OXYCODONE HYDROCHLORIDE AND ACETAMINOPHEN 2 TABLET: 5; 325 TABLET ORAL at 23:04

## 2022-01-01 RX ADMIN — FOLIC ACID 1 MG: 1 TABLET ORAL at 08:31

## 2022-01-01 RX ADMIN — DOCUSATE SODIUM 100 MG: 100 CAPSULE ORAL at 08:31

## 2022-01-01 RX ADMIN — Medication 1 CAPSULE: at 09:28

## 2022-01-01 RX ADMIN — ALBUTEROL SULFATE 5 MG: 5 SOLUTION RESPIRATORY (INHALATION) at 19:55

## 2022-01-01 RX ADMIN — Medication 7 MG/HR: at 14:19

## 2022-01-01 RX ADMIN — BUDESONIDE AND FORMOTEROL FUMARATE DIHYDRATE 2 PUFF: 160; 4.5 AEROSOL RESPIRATORY (INHALATION) at 19:41

## 2022-01-01 RX ADMIN — FOLIC ACID 1 MG: 1 TABLET ORAL at 09:10

## 2022-01-01 RX ADMIN — DESMOPRESSIN ACETATE 40 MG: 0.2 TABLET ORAL at 21:53

## 2022-01-01 RX ADMIN — DOCUSATE SODIUM 100 MG: 100 CAPSULE ORAL at 08:57

## 2022-01-01 RX ADMIN — HEPARIN SODIUM 5000 UNITS: 5000 INJECTION INTRAVENOUS; SUBCUTANEOUS at 15:12

## 2022-01-01 RX ADMIN — FAMOTIDINE 20 MG: 20 TABLET, FILM COATED ORAL at 08:18

## 2022-01-01 RX ADMIN — ALBUTEROL SULFATE 2.5 MG: 2.5 SOLUTION RESPIRATORY (INHALATION) at 07:45

## 2022-01-01 RX ADMIN — HYDRALAZINE HYDROCHLORIDE 25 MG: 25 TABLET ORAL at 05:06

## 2022-01-01 RX ADMIN — MYCOPHENOLATE MOFETIL 300 MG: 500 TABLET ORAL at 14:31

## 2022-01-01 RX ADMIN — FERROUS SULFATE TAB EC 325 MG (65 MG FE EQUIVALENT) 325 MG: 325 (65 FE) TABLET DELAYED RESPONSE at 09:00

## 2022-01-01 RX ADMIN — SODIUM CHLORIDE, POTASSIUM CHLORIDE, SODIUM LACTATE AND CALCIUM CHLORIDE 100 ML/HR: 600; 310; 30; 20 INJECTION, SOLUTION INTRAVENOUS at 02:07

## 2022-01-01 RX ADMIN — OXYCODONE HYDROCHLORIDE AND ACETAMINOPHEN 2 TABLET: 5; 325 TABLET ORAL at 18:31

## 2022-01-01 RX ADMIN — HEPARIN SODIUM 5000 UNITS: 5000 INJECTION INTRAVENOUS; SUBCUTANEOUS at 05:49

## 2022-01-01 RX ADMIN — OXYCODONE HYDROCHLORIDE AND ACETAMINOPHEN 1 TABLET: 5; 325 TABLET ORAL at 08:15

## 2022-01-01 RX ADMIN — IPRATROPIUM BROMIDE AND ALBUTEROL SULFATE 1 AMPULE: .5; 3 SOLUTION RESPIRATORY (INHALATION) at 17:25

## 2022-01-01 RX ADMIN — HEPARIN SODIUM 5000 UNITS: 5000 INJECTION INTRAVENOUS; SUBCUTANEOUS at 14:37

## 2022-01-01 RX ADMIN — OXYCODONE HYDROCHLORIDE AND ACETAMINOPHEN 2 TABLET: 5; 325 TABLET ORAL at 17:11

## 2022-01-01 RX ADMIN — BUMETANIDE 1 MG: 0.25 INJECTION, SOLUTION INTRAMUSCULAR; INTRAVENOUS at 08:30

## 2022-01-01 RX ADMIN — CETIRIZINE HYDROCHLORIDE 10 MG: 10 TABLET ORAL at 08:45

## 2022-01-01 RX ADMIN — SERTRALINE 100 MG: 50 TABLET, FILM COATED ORAL at 08:41

## 2022-01-01 RX ADMIN — BUMETANIDE 2 MG: 1 TABLET ORAL at 08:47

## 2022-01-01 RX ADMIN — FERROUS SULFATE TAB EC 325 MG (65 MG FE EQUIVALENT) 325 MG: 325 (65 FE) TABLET DELAYED RESPONSE at 18:16

## 2022-01-01 RX ADMIN — ENOXAPARIN SODIUM 30 MG: 100 INJECTION SUBCUTANEOUS at 09:36

## 2022-01-01 RX ADMIN — FUROSEMIDE 40 MG: 10 INJECTION, SOLUTION INTRAMUSCULAR; INTRAVENOUS at 17:10

## 2022-01-01 RX ADMIN — ENOXAPARIN SODIUM 30 MG: 100 INJECTION SUBCUTANEOUS at 20:37

## 2022-01-01 RX ADMIN — INSULIN LISPRO 9 UNITS: 100 INJECTION, SOLUTION INTRAVENOUS; SUBCUTANEOUS at 19:47

## 2022-01-01 RX ADMIN — SODIUM CHLORIDE, PRESERVATIVE FREE 10 ML: 5 INJECTION INTRAVENOUS at 19:23

## 2022-01-01 RX ADMIN — HEPARIN SODIUM 5000 UNITS: 5000 INJECTION INTRAVENOUS; SUBCUTANEOUS at 06:24

## 2022-01-01 RX ADMIN — INSULIN GLARGINE 20 UNITS: 100 INJECTION, SOLUTION SUBCUTANEOUS at 20:31

## 2022-01-01 RX ADMIN — SERTRALINE 100 MG: 50 TABLET, FILM COATED ORAL at 09:56

## 2022-01-01 RX ADMIN — OXYCODONE HYDROCHLORIDE 5 MG: 5 SOLUTION ORAL at 22:13

## 2022-01-01 RX ADMIN — BUMETANIDE 2 MG: 0.25 INJECTION INTRAMUSCULAR; INTRAVENOUS at 08:55

## 2022-01-01 RX ADMIN — SODIUM CHLORIDE, PRESERVATIVE FREE 10 ML: 5 INJECTION INTRAVENOUS at 08:27

## 2022-01-01 RX ADMIN — SPIRONOLACTONE 25 MG: 25 TABLET ORAL at 09:12

## 2022-01-01 RX ADMIN — ALBUTEROL SULFATE 2.5 MG: 2.5 SOLUTION RESPIRATORY (INHALATION) at 11:46

## 2022-01-01 RX ADMIN — OXYCODONE HYDROCHLORIDE AND ACETAMINOPHEN 1 TABLET: 5; 325 TABLET ORAL at 09:37

## 2022-01-01 RX ADMIN — ACETAMINOPHEN 650 MG: 325 TABLET ORAL at 08:34

## 2022-01-01 RX ADMIN — VASOPRESSIN 0.03 UNITS/MIN: 20 INJECTION PARENTERAL at 22:29

## 2022-01-01 RX ADMIN — BUMETANIDE 2 MG: 1 TABLET ORAL at 08:54

## 2022-01-01 RX ADMIN — GUAIFENESIN 600 MG: 600 TABLET, EXTENDED RELEASE ORAL at 23:51

## 2022-01-01 RX ADMIN — MINERAL OIL AND WHITE PETROLATUM: 150; 830 OINTMENT OPHTHALMIC at 09:53

## 2022-01-01 RX ADMIN — DESMOPRESSIN ACETATE 40 MG: 0.2 TABLET ORAL at 21:16

## 2022-01-01 RX ADMIN — BUDESONIDE AND FORMOTEROL FUMARATE DIHYDRATE 2 PUFF: 160; 4.5 AEROSOL RESPIRATORY (INHALATION) at 08:03

## 2022-01-01 RX ADMIN — FERROUS SULFATE TAB EC 325 MG (65 MG FE EQUIVALENT) 325 MG: 325 (65 FE) TABLET DELAYED RESPONSE at 18:27

## 2022-01-01 RX ADMIN — HYDRALAZINE HYDROCHLORIDE 25 MG: 25 TABLET ORAL at 22:25

## 2022-01-01 RX ADMIN — FUROSEMIDE 40 MG: 10 INJECTION, SOLUTION INTRAMUSCULAR; INTRAVENOUS at 18:27

## 2022-01-01 RX ADMIN — IPRATROPIUM BROMIDE AND ALBUTEROL SULFATE 1 AMPULE: .5; 3 SOLUTION RESPIRATORY (INHALATION) at 21:08

## 2022-01-01 RX ADMIN — TIOTROPIUM BROMIDE INHALATION SPRAY 2 PUFF: 3.12 SPRAY, METERED RESPIRATORY (INHALATION) at 07:36

## 2022-01-01 RX ADMIN — OXYCODONE HYDROCHLORIDE AND ACETAMINOPHEN 1 TABLET: 5; 325 TABLET ORAL at 22:20

## 2022-01-01 RX ADMIN — BUMETANIDE 1 MG: 0.25 INJECTION, SOLUTION INTRAMUSCULAR; INTRAVENOUS at 08:06

## 2022-01-01 RX ADMIN — HYDRALAZINE HYDROCHLORIDE 25 MG: 25 TABLET ORAL at 21:03

## 2022-01-01 RX ADMIN — INSULIN LISPRO 1 UNITS: 100 INJECTION, SOLUTION INTRAVENOUS; SUBCUTANEOUS at 20:58

## 2022-01-01 RX ADMIN — CEFTRIAXONE SODIUM 2000 MG: 2 INJECTION, POWDER, FOR SOLUTION INTRAMUSCULAR; INTRAVENOUS at 13:34

## 2022-01-01 RX ADMIN — DEXAMETHASONE SODIUM PHOSPHATE 6 MG: 10 INJECTION INTRAMUSCULAR; INTRAVENOUS at 09:32

## 2022-01-01 RX ADMIN — DESMOPRESSIN ACETATE 40 MG: 0.2 TABLET ORAL at 21:45

## 2022-01-01 RX ADMIN — SODIUM CHLORIDE, PRESERVATIVE FREE 10 ML: 5 INJECTION INTRAVENOUS at 08:46

## 2022-01-01 RX ADMIN — DEXAMETHASONE SODIUM PHOSPHATE 10 MG: 10 INJECTION INTRAMUSCULAR; INTRAVENOUS at 08:45

## 2022-01-01 RX ADMIN — FAMOTIDINE 20 MG: 20 TABLET, FILM COATED ORAL at 08:35

## 2022-01-01 RX ADMIN — PANTOPRAZOLE SODIUM 40 MG: 40 TABLET, DELAYED RELEASE ORAL at 08:27

## 2022-01-01 RX ADMIN — FOLIC ACID 1 MG: 1 TABLET ORAL at 08:08

## 2022-01-01 RX ADMIN — ALBUTEROL SULFATE 2.5 MG: 2.5 SOLUTION RESPIRATORY (INHALATION) at 09:08

## 2022-01-01 RX ADMIN — AMLODIPINE BESYLATE 5 MG: 5 TABLET ORAL at 09:22

## 2022-01-01 RX ADMIN — PANTOPRAZOLE SODIUM 40 MG: 40 TABLET, DELAYED RELEASE ORAL at 08:25

## 2022-01-01 RX ADMIN — FUROSEMIDE 40 MG: 10 INJECTION, SOLUTION INTRAMUSCULAR; INTRAVENOUS at 09:29

## 2022-01-01 RX ADMIN — SODIUM CHLORIDE 5 ML/HR: 9 INJECTION, SOLUTION INTRAVENOUS at 02:11

## 2022-01-01 RX ADMIN — ENOXAPARIN SODIUM 30 MG: 100 INJECTION SUBCUTANEOUS at 08:05

## 2022-01-01 RX ADMIN — ALBUTEROL SULFATE 2.5 MG: 2.5 SOLUTION RESPIRATORY (INHALATION) at 15:31

## 2022-01-01 RX ADMIN — BUDESONIDE AND FORMOTEROL FUMARATE DIHYDRATE 2 PUFF: 160; 4.5 AEROSOL RESPIRATORY (INHALATION) at 07:55

## 2022-01-01 RX ADMIN — SPIRONOLACTONE 25 MG: 25 TABLET ORAL at 08:41

## 2022-01-01 RX ADMIN — POTASSIUM CHLORIDE 20 MEQ: 20 TABLET, EXTENDED RELEASE ORAL at 09:57

## 2022-01-01 RX ADMIN — FUROSEMIDE 80 MG: 10 INJECTION, SOLUTION INTRAMUSCULAR; INTRAVENOUS at 09:22

## 2022-01-01 RX ADMIN — PIPERACILLIN AND TAZOBACTAM 3375 MG: 3; .375 INJECTION, POWDER, FOR SOLUTION INTRAVENOUS at 08:18

## 2022-01-01 RX ADMIN — INSULIN LISPRO 1 UNITS: 100 INJECTION, SOLUTION INTRAVENOUS; SUBCUTANEOUS at 09:24

## 2022-01-01 RX ADMIN — SPIRONOLACTONE 25 MG: 25 TABLET ORAL at 09:40

## 2022-01-01 RX ADMIN — INSULIN LISPRO 3 UNITS: 100 INJECTION, SOLUTION INTRAVENOUS; SUBCUTANEOUS at 21:00

## 2022-01-01 RX ADMIN — SERTRALINE 100 MG: 50 TABLET, FILM COATED ORAL at 08:30

## 2022-01-01 RX ADMIN — OXYCODONE HYDROCHLORIDE AND ACETAMINOPHEN 1 TABLET: 5; 325 TABLET ORAL at 23:06

## 2022-01-01 RX ADMIN — Medication 2 MG/HR: at 11:50

## 2022-01-01 RX ADMIN — INSULIN LISPRO 1 UNITS: 100 INJECTION, SOLUTION INTRAVENOUS; SUBCUTANEOUS at 08:50

## 2022-01-01 RX ADMIN — LORAZEPAM 1 MG: 2 INJECTION INTRAMUSCULAR at 23:07

## 2022-01-01 RX ADMIN — ISOSORBIDE DINITRATE 20 MG: 10 TABLET ORAL at 19:30

## 2022-01-01 RX ADMIN — INSULIN LISPRO 2 UNITS: 100 INJECTION, SOLUTION INTRAVENOUS; SUBCUTANEOUS at 20:56

## 2022-01-01 RX ADMIN — FOLIC ACID 1 MG: 1 TABLET ORAL at 09:21

## 2022-01-01 RX ADMIN — GABAPENTIN 200 MG: 100 CAPSULE ORAL at 15:29

## 2022-01-01 RX ADMIN — SODIUM CHLORIDE, PRESERVATIVE FREE 10 ML: 5 INJECTION INTRAVENOUS at 08:33

## 2022-01-01 RX ADMIN — OXYCODONE HYDROCHLORIDE AND ACETAMINOPHEN 1 TABLET: 5; 325 TABLET ORAL at 18:39

## 2022-01-01 RX ADMIN — LINEZOLID 600 MG: 600 INJECTION, SOLUTION INTRAVENOUS at 20:11

## 2022-01-01 RX ADMIN — BUDESONIDE AND FORMOTEROL FUMARATE DIHYDRATE 2 PUFF: 160; 4.5 AEROSOL RESPIRATORY (INHALATION) at 22:09

## 2022-01-01 RX ADMIN — GABAPENTIN 200 MG: 100 CAPSULE ORAL at 21:28

## 2022-01-01 RX ADMIN — INSULIN LISPRO 3 UNITS: 100 INJECTION, SOLUTION INTRAVENOUS; SUBCUTANEOUS at 08:15

## 2022-01-01 RX ADMIN — FLUTICASONE PROPIONATE 1 SPRAY: 50 SPRAY, METERED NASAL at 10:30

## 2022-01-01 RX ADMIN — OXYCODONE HYDROCHLORIDE 5 MG: 5 SOLUTION ORAL at 17:09

## 2022-01-01 RX ADMIN — ENOXAPARIN SODIUM 30 MG: 100 INJECTION SUBCUTANEOUS at 08:55

## 2022-01-01 RX ADMIN — SODIUM CHLORIDE 500 ML: 9 INJECTION, SOLUTION INTRAVENOUS at 22:10

## 2022-01-01 RX ADMIN — AMLODIPINE BESYLATE 5 MG: 5 TABLET ORAL at 08:36

## 2022-01-01 RX ADMIN — DIAZEPAM 5 MG: 5 TABLET ORAL at 22:29

## 2022-01-01 RX ADMIN — SODIUM CHLORIDE, PRESERVATIVE FREE 10 ML: 5 INJECTION INTRAVENOUS at 19:53

## 2022-01-01 RX ADMIN — MYCOPHENOLATE MOFETIL 300 MG: 500 TABLET ORAL at 14:43

## 2022-01-01 RX ADMIN — CETIRIZINE HYDROCHLORIDE 10 MG: 10 TABLET ORAL at 09:32

## 2022-01-01 RX ADMIN — LINEZOLID 600 MG: 600 INJECTION, SOLUTION INTRAVENOUS at 21:30

## 2022-01-01 RX ADMIN — ALBUTEROL SULFATE 2.5 MG: 2.5 SOLUTION RESPIRATORY (INHALATION) at 12:04

## 2022-01-01 RX ADMIN — HEPARIN SODIUM 5000 UNITS: 5000 INJECTION INTRAVENOUS; SUBCUTANEOUS at 06:28

## 2022-01-01 RX ADMIN — HEPARIN SODIUM 5000 UNITS: 5000 INJECTION INTRAVENOUS; SUBCUTANEOUS at 14:31

## 2022-01-01 RX ADMIN — MIDODRINE HYDROCHLORIDE 5 MG: 5 TABLET ORAL at 12:48

## 2022-01-01 RX ADMIN — INSULIN LISPRO 9 UNITS: 100 INJECTION, SOLUTION INTRAVENOUS; SUBCUTANEOUS at 04:18

## 2022-01-01 RX ADMIN — FLUTICASONE PROPIONATE 1 SPRAY: 50 SPRAY, METERED NASAL at 09:26

## 2022-01-01 RX ADMIN — INSULIN LISPRO 3 UNITS: 100 INJECTION, SOLUTION INTRAVENOUS; SUBCUTANEOUS at 09:12

## 2022-01-01 RX ADMIN — BUDESONIDE AND FORMOTEROL FUMARATE DIHYDRATE 2 PUFF: 160; 4.5 AEROSOL RESPIRATORY (INHALATION) at 21:10

## 2022-01-01 RX ADMIN — BUDESONIDE AND FORMOTEROL FUMARATE DIHYDRATE 2 PUFF: 160; 4.5 AEROSOL RESPIRATORY (INHALATION) at 20:53

## 2022-01-01 RX ADMIN — DESMOPRESSIN ACETATE 40 MG: 0.2 TABLET ORAL at 21:07

## 2022-01-01 RX ADMIN — GABAPENTIN 400 MG: 400 CAPSULE ORAL at 16:04

## 2022-01-01 RX ADMIN — LORAZEPAM 1 MG: 2 INJECTION INTRAMUSCULAR at 15:13

## 2022-01-01 RX ADMIN — DOCUSATE SODIUM 100 MG: 100 CAPSULE ORAL at 16:51

## 2022-01-01 RX ADMIN — BENZONATATE 100 MG: 100 CAPSULE ORAL at 15:29

## 2022-01-01 RX ADMIN — SPIRONOLACTONE 25 MG: 25 TABLET ORAL at 07:45

## 2022-01-01 RX ADMIN — MYCOPHENOLATE MOFETIL 300 MG: 500 TABLET ORAL at 14:37

## 2022-01-01 RX ADMIN — MYCOPHENOLATE MOFETIL 300 MG: 500 TABLET ORAL at 08:09

## 2022-01-01 RX ADMIN — MYCOPHENOLATE MOFETIL 300 MG: 500 TABLET ORAL at 08:16

## 2022-01-01 RX ADMIN — ISOSORBIDE DINITRATE 20 MG: 10 TABLET ORAL at 16:50

## 2022-01-01 RX ADMIN — AMLODIPINE BESYLATE 5 MG: 5 TABLET ORAL at 09:21

## 2022-01-01 RX ADMIN — GABAPENTIN 300 MG: 600 TABLET ORAL at 21:07

## 2022-01-01 RX ADMIN — MYCOPHENOLATE MOFETIL 300 MG: 500 TABLET ORAL at 09:57

## 2022-01-01 RX ADMIN — SODIUM CHLORIDE, PRESERVATIVE FREE 10 ML: 5 INJECTION INTRAVENOUS at 19:50

## 2022-01-01 RX ADMIN — DOCUSATE SODIUM 100 MG: 100 CAPSULE ORAL at 15:34

## 2022-01-01 RX ADMIN — BUMETANIDE 2 MG: 1 TABLET ORAL at 08:58

## 2022-01-01 RX ADMIN — ISOSORBIDE DINITRATE 20 MG: 10 TABLET ORAL at 14:31

## 2022-01-01 RX ADMIN — HYDRALAZINE HYDROCHLORIDE 25 MG: 25 TABLET ORAL at 21:06

## 2022-01-01 RX ADMIN — FAMOTIDINE 20 MG: 20 TABLET, FILM COATED ORAL at 20:21

## 2022-01-01 RX ADMIN — GUAIFENESIN 600 MG: 600 TABLET, EXTENDED RELEASE ORAL at 08:47

## 2022-01-01 RX ADMIN — GUAIFENESIN 600 MG: 600 TABLET, EXTENDED RELEASE ORAL at 19:59

## 2022-01-01 RX ADMIN — ENOXAPARIN SODIUM 30 MG: 100 INJECTION SUBCUTANEOUS at 19:30

## 2022-01-01 RX ADMIN — Medication 1 CAPSULE: at 08:36

## 2022-01-01 RX ADMIN — FOLIC ACID 1 MG: 1 TABLET ORAL at 08:26

## 2022-01-01 RX ADMIN — HYDRALAZINE HYDROCHLORIDE 25 MG: 25 TABLET ORAL at 06:03

## 2022-01-01 RX ADMIN — HYDRALAZINE HYDROCHLORIDE 25 MG: 25 TABLET ORAL at 13:29

## 2022-01-01 RX ADMIN — DESMOPRESSIN ACETATE 40 MG: 0.2 TABLET ORAL at 20:44

## 2022-01-01 RX ADMIN — HEPARIN SODIUM 5000 UNITS: 5000 INJECTION INTRAVENOUS; SUBCUTANEOUS at 13:00

## 2022-01-01 RX ADMIN — HYDRALAZINE HYDROCHLORIDE 25 MG: 25 TABLET ORAL at 06:20

## 2022-01-01 RX ADMIN — HYDRALAZINE HYDROCHLORIDE 25 MG: 25 TABLET ORAL at 22:33

## 2022-01-01 RX ADMIN — MYCOPHENOLATE MOFETIL 300 MG: 500 TABLET ORAL at 20:22

## 2022-01-01 RX ADMIN — INSULIN LISPRO 2 UNITS: 100 INJECTION, SOLUTION INTRAVENOUS; SUBCUTANEOUS at 08:53

## 2022-01-01 RX ADMIN — GABAPENTIN 300 MG: 600 TABLET ORAL at 20:04

## 2022-01-01 RX ADMIN — GUAIFENESIN 600 MG: 600 TABLET, EXTENDED RELEASE ORAL at 08:57

## 2022-01-01 RX ADMIN — LORAZEPAM 1 MG: 2 INJECTION INTRAMUSCULAR at 08:40

## 2022-01-01 RX ADMIN — FOLIC ACID 1 MG: 1 TABLET ORAL at 08:59

## 2022-01-01 RX ADMIN — HYDRALAZINE HYDROCHLORIDE 25 MG: 25 TABLET ORAL at 22:05

## 2022-01-01 RX ADMIN — AMLODIPINE BESYLATE 5 MG: 5 TABLET ORAL at 08:57

## 2022-01-01 RX ADMIN — OXYCODONE HYDROCHLORIDE AND ACETAMINOPHEN 1 TABLET: 5; 325 TABLET ORAL at 17:05

## 2022-01-01 RX ADMIN — BUDESONIDE AND FORMOTEROL FUMARATE DIHYDRATE 2 PUFF: 160; 4.5 AEROSOL RESPIRATORY (INHALATION) at 08:59

## 2022-01-01 RX ADMIN — GUAIFENESIN 600 MG: 600 TABLET, EXTENDED RELEASE ORAL at 08:14

## 2022-01-01 RX ADMIN — HEPARIN SODIUM 5000 UNITS: 5000 INJECTION INTRAVENOUS; SUBCUTANEOUS at 21:39

## 2022-01-01 RX ADMIN — INSULIN LISPRO 3 UNITS: 100 INJECTION, SOLUTION INTRAVENOUS; SUBCUTANEOUS at 16:17

## 2022-01-01 RX ADMIN — MYCOPHENOLATE MOFETIL 300 MG: 500 TABLET ORAL at 07:59

## 2022-01-01 RX ADMIN — OXYCODONE HYDROCHLORIDE AND ACETAMINOPHEN 1 TABLET: 5; 325 TABLET ORAL at 07:58

## 2022-01-01 RX ADMIN — GUAIFENESIN 600 MG: 600 TABLET, EXTENDED RELEASE ORAL at 09:21

## 2022-01-01 RX ADMIN — PREDNISONE 40 MG: 20 TABLET ORAL at 08:51

## 2022-01-01 RX ADMIN — INSULIN GLARGINE 10 UNITS: 100 INJECTION, SOLUTION SUBCUTANEOUS at 21:00

## 2022-01-01 RX ADMIN — FUROSEMIDE 40 MG: 10 INJECTION, SOLUTION INTRAMUSCULAR; INTRAVENOUS at 08:43

## 2022-01-01 RX ADMIN — BUDESONIDE AND FORMOTEROL FUMARATE DIHYDRATE 2 PUFF: 160; 4.5 AEROSOL RESPIRATORY (INHALATION) at 07:51

## 2022-01-01 RX ADMIN — OXYCODONE HYDROCHLORIDE AND ACETAMINOPHEN 2 TABLET: 5; 325 TABLET ORAL at 06:10

## 2022-01-01 RX ADMIN — FLUTICASONE PROPIONATE 1 SPRAY: 50 SPRAY, METERED NASAL at 08:39

## 2022-01-01 RX ADMIN — FUROSEMIDE 40 MG: 10 INJECTION, SOLUTION INTRAMUSCULAR; INTRAVENOUS at 17:08

## 2022-01-01 RX ADMIN — ISOSORBIDE DINITRATE 20 MG: 10 TABLET ORAL at 08:47

## 2022-01-01 RX ADMIN — PANTOPRAZOLE SODIUM 40 MG: 40 TABLET, DELAYED RELEASE ORAL at 09:18

## 2022-01-01 RX ADMIN — ALBUTEROL SULFATE 2.5 MG: 2.5 SOLUTION RESPIRATORY (INHALATION) at 11:07

## 2022-01-01 RX ADMIN — IPRATROPIUM BROMIDE AND ALBUTEROL SULFATE 1 AMPULE: .5; 3 SOLUTION RESPIRATORY (INHALATION) at 11:50

## 2022-01-01 RX ADMIN — INSULIN LISPRO 3 UNITS: 100 INJECTION, SOLUTION INTRAVENOUS; SUBCUTANEOUS at 02:58

## 2022-01-01 RX ADMIN — DOCUSATE SODIUM 100 MG: 100 CAPSULE ORAL at 08:05

## 2022-01-01 RX ADMIN — BUDESONIDE AND FORMOTEROL FUMARATE DIHYDRATE 2 PUFF: 160; 4.5 AEROSOL RESPIRATORY (INHALATION) at 08:49

## 2022-01-01 RX ADMIN — OXYCODONE HYDROCHLORIDE AND ACETAMINOPHEN 2 TABLET: 5; 325 TABLET ORAL at 12:20

## 2022-01-01 RX ADMIN — INSULIN LISPRO 2 UNITS: 100 INJECTION, SOLUTION INTRAVENOUS; SUBCUTANEOUS at 13:02

## 2022-01-01 RX ADMIN — HEPARIN SODIUM 5000 UNITS: 5000 INJECTION INTRAVENOUS; SUBCUTANEOUS at 13:39

## 2022-01-01 RX ADMIN — OXYCODONE HYDROCHLORIDE AND ACETAMINOPHEN 2 TABLET: 5; 325 TABLET ORAL at 08:57

## 2022-01-01 RX ADMIN — ONDANSETRON 4 MG: 2 INJECTION INTRAMUSCULAR; INTRAVENOUS at 17:56

## 2022-01-01 RX ADMIN — ENOXAPARIN SODIUM 30 MG: 100 INJECTION SUBCUTANEOUS at 08:46

## 2022-01-01 RX ADMIN — FAMOTIDINE 20 MG: 20 TABLET, FILM COATED ORAL at 08:46

## 2022-01-01 RX ADMIN — ISOSORBIDE DINITRATE 20 MG: 10 TABLET ORAL at 08:31

## 2022-01-01 RX ADMIN — ISOSORBIDE DINITRATE 20 MG: 10 TABLET ORAL at 12:53

## 2022-01-01 RX ADMIN — BUMETANIDE 2 MG: 1 TABLET ORAL at 08:13

## 2022-01-01 RX ADMIN — HEPARIN SODIUM 5000 UNITS: 5000 INJECTION INTRAVENOUS; SUBCUTANEOUS at 22:41

## 2022-01-01 RX ADMIN — INSULIN LISPRO 3 UNITS: 100 INJECTION, SOLUTION INTRAVENOUS; SUBCUTANEOUS at 12:03

## 2022-01-01 RX ADMIN — LORAZEPAM 1 MG: 2 INJECTION INTRAMUSCULAR at 19:30

## 2022-01-01 RX ADMIN — INSULIN LISPRO 6 UNITS: 100 INJECTION, SOLUTION INTRAVENOUS; SUBCUTANEOUS at 17:01

## 2022-01-01 RX ADMIN — SPIRONOLACTONE 25 MG: 25 TABLET ORAL at 08:10

## 2022-01-01 RX ADMIN — HEPARIN SODIUM 5000 UNITS: 5000 INJECTION INTRAVENOUS; SUBCUTANEOUS at 14:42

## 2022-01-01 RX ADMIN — MYCOPHENOLATE MOFETIL 300 MG: 500 TABLET ORAL at 13:35

## 2022-01-01 RX ADMIN — HYDRALAZINE HYDROCHLORIDE 25 MG: 25 TABLET ORAL at 14:56

## 2022-01-01 RX ADMIN — FOLIC ACID 1 MG: 1 TABLET ORAL at 09:18

## 2022-01-01 RX ADMIN — DESMOPRESSIN ACETATE 40 MG: 0.2 TABLET ORAL at 21:47

## 2022-01-01 RX ADMIN — BUDESONIDE AND FORMOTEROL FUMARATE DIHYDRATE 2 PUFF: 160; 4.5 AEROSOL RESPIRATORY (INHALATION) at 20:55

## 2022-01-01 RX ADMIN — ISOSORBIDE DINITRATE 20 MG: 10 TABLET ORAL at 12:26

## 2022-01-01 RX ADMIN — CEFTRIAXONE SODIUM 2000 MG: 2 INJECTION, POWDER, FOR SOLUTION INTRAMUSCULAR; INTRAVENOUS at 13:11

## 2022-01-01 RX ADMIN — CETIRIZINE HYDROCHLORIDE 10 MG: 10 TABLET ORAL at 09:00

## 2022-01-01 RX ADMIN — FOLIC ACID 1 MG: 1 TABLET ORAL at 08:54

## 2022-01-01 RX ADMIN — GABAPENTIN 300 MG: 600 TABLET ORAL at 15:13

## 2022-01-01 RX ADMIN — ISOSORBIDE DINITRATE 20 MG: 10 TABLET ORAL at 14:39

## 2022-01-01 RX ADMIN — FERROUS SULFATE TAB EC 325 MG (65 MG FE EQUIVALENT) 325 MG: 325 (65 FE) TABLET DELAYED RESPONSE at 17:47

## 2022-01-01 RX ADMIN — TIOTROPIUM BROMIDE INHALATION SPRAY 2 PUFF: 3.12 SPRAY, METERED RESPIRATORY (INHALATION) at 07:56

## 2022-01-01 RX ADMIN — IPRATROPIUM BROMIDE AND ALBUTEROL SULFATE 1 AMPULE: .5; 3 SOLUTION RESPIRATORY (INHALATION) at 11:05

## 2022-01-01 RX ADMIN — FUROSEMIDE 40 MG: 10 INJECTION, SOLUTION INTRAMUSCULAR; INTRAVENOUS at 17:15

## 2022-01-01 RX ADMIN — CISATRACURIUM BESYLATE 2 MCG/KG/MIN: 10 INJECTION, SOLUTION INTRAVENOUS at 02:04

## 2022-01-01 RX ADMIN — HEPARIN SODIUM 5000 UNITS: 5000 INJECTION INTRAVENOUS; SUBCUTANEOUS at 14:20

## 2022-01-01 RX ADMIN — LORAZEPAM 1 MG: 2 INJECTION INTRAMUSCULAR at 17:32

## 2022-01-01 RX ADMIN — HEPARIN SODIUM 5000 UNITS: 5000 INJECTION INTRAVENOUS; SUBCUTANEOUS at 06:54

## 2022-01-01 RX ADMIN — LINEZOLID 600 MG: 600 INJECTION, SOLUTION INTRAVENOUS at 08:45

## 2022-01-01 RX ADMIN — AMLODIPINE BESYLATE 5 MG: 5 TABLET ORAL at 08:16

## 2022-01-01 RX ADMIN — DIAZEPAM 5 MG: 5 TABLET ORAL at 00:12

## 2022-01-01 RX ADMIN — GUAIFENESIN 600 MG: 600 TABLET, EXTENDED RELEASE ORAL at 21:02

## 2022-01-01 RX ADMIN — OXYCODONE HYDROCHLORIDE AND ACETAMINOPHEN 2 TABLET: 5; 325 TABLET ORAL at 08:40

## 2022-01-01 RX ADMIN — ONDANSETRON 4 MG: 4 TABLET, ORALLY DISINTEGRATING ORAL at 11:32

## 2022-01-01 RX ADMIN — PANTOPRAZOLE SODIUM 40 MG: 40 TABLET, DELAYED RELEASE ORAL at 07:45

## 2022-01-01 RX ADMIN — HYDRALAZINE HYDROCHLORIDE 25 MG: 25 TABLET ORAL at 14:27

## 2022-01-01 RX ADMIN — HEPARIN SODIUM 5000 UNITS: 5000 INJECTION INTRAVENOUS; SUBCUTANEOUS at 06:57

## 2022-01-01 RX ADMIN — BUMETANIDE 2 MG: 1 TABLET ORAL at 08:45

## 2022-01-01 RX ADMIN — DESMOPRESSIN ACETATE 40 MG: 0.2 TABLET ORAL at 20:37

## 2022-01-01 RX ADMIN — BUDESONIDE AND FORMOTEROL FUMARATE DIHYDRATE 2 PUFF: 160; 4.5 AEROSOL RESPIRATORY (INHALATION) at 08:00

## 2022-01-01 RX ADMIN — INSULIN LISPRO 1 UNITS: 100 INJECTION, SOLUTION INTRAVENOUS; SUBCUTANEOUS at 21:08

## 2022-01-01 RX ADMIN — PANTOPRAZOLE SODIUM 40 MG: 40 TABLET, DELAYED RELEASE ORAL at 08:43

## 2022-01-01 RX ADMIN — GABAPENTIN 300 MG: 600 TABLET ORAL at 20:22

## 2022-01-01 RX ADMIN — ISOSORBIDE DINITRATE 20 MG: 10 TABLET ORAL at 12:54

## 2022-01-01 RX ADMIN — BUMETANIDE 2 MG: 1 TABLET ORAL at 21:07

## 2022-01-01 RX ADMIN — HEPARIN SODIUM 5000 UNITS: 5000 INJECTION INTRAVENOUS; SUBCUTANEOUS at 06:17

## 2022-01-01 RX ADMIN — SODIUM CHLORIDE, PRESERVATIVE FREE 10 ML: 5 INJECTION INTRAVENOUS at 20:46

## 2022-01-01 RX ADMIN — SPIRONOLACTONE 25 MG: 25 TABLET ORAL at 07:56

## 2022-01-01 RX ADMIN — SERTRALINE 100 MG: 50 TABLET, FILM COATED ORAL at 09:19

## 2022-01-01 RX ADMIN — FUROSEMIDE 40 MG: 10 INJECTION, SOLUTION INTRAMUSCULAR; INTRAVENOUS at 16:59

## 2022-01-01 RX ADMIN — LORAZEPAM 1 MG: 2 INJECTION INTRAMUSCULAR at 21:15

## 2022-01-01 RX ADMIN — FUROSEMIDE 40 MG: 10 INJECTION, SOLUTION INTRAMUSCULAR; INTRAVENOUS at 18:39

## 2022-01-01 RX ADMIN — Medication 1000 MCG: at 09:30

## 2022-01-01 RX ADMIN — Medication 1000 MCG: at 07:45

## 2022-01-01 RX ADMIN — SODIUM CHLORIDE, PRESERVATIVE FREE 10 ML: 5 INJECTION INTRAVENOUS at 20:37

## 2022-01-01 RX ADMIN — MYCOPHENOLATE MOFETIL 300 MG: 500 TABLET ORAL at 13:41

## 2022-01-01 RX ADMIN — HEPARIN SODIUM 5000 UNITS: 5000 INJECTION INTRAVENOUS; SUBCUTANEOUS at 06:35

## 2022-01-01 RX ADMIN — ISOSORBIDE DINITRATE 20 MG: 10 TABLET ORAL at 18:16

## 2022-01-01 RX ADMIN — BUMETANIDE 2 MG: 1 TABLET ORAL at 08:31

## 2022-01-01 RX ADMIN — Medication 7 MG/HR: at 09:41

## 2022-01-01 RX ADMIN — GABAPENTIN 400 MG: 400 CAPSULE ORAL at 20:03

## 2022-01-01 RX ADMIN — TIOTROPIUM BROMIDE INHALATION SPRAY 2 PUFF: 3.12 SPRAY, METERED RESPIRATORY (INHALATION) at 07:45

## 2022-01-01 RX ADMIN — MORPHINE SULFATE 1 MG: 2 INJECTION, SOLUTION INTRAMUSCULAR; INTRAVENOUS at 21:01

## 2022-01-01 RX ADMIN — ALBUTEROL SULFATE 2.5 MG: 2.5 SOLUTION RESPIRATORY (INHALATION) at 08:46

## 2022-01-01 RX ADMIN — SERTRALINE 100 MG: 50 TABLET, FILM COATED ORAL at 08:45

## 2022-01-01 RX ADMIN — ISOSORBIDE DINITRATE 20 MG: 10 TABLET ORAL at 08:09

## 2022-01-01 RX ADMIN — SODIUM CHLORIDE, PRESERVATIVE FREE 10 ML: 5 INJECTION INTRAVENOUS at 22:13

## 2022-01-01 RX ADMIN — INSULIN LISPRO 2 UNITS: 100 INJECTION, SOLUTION INTRAVENOUS; SUBCUTANEOUS at 12:15

## 2022-01-01 RX ADMIN — AMLODIPINE BESYLATE 5 MG: 5 TABLET ORAL at 08:55

## 2022-01-01 RX ADMIN — SODIUM CHLORIDE, PRESERVATIVE FREE 10 ML: 5 INJECTION INTRAVENOUS at 08:47

## 2022-01-01 RX ADMIN — FUROSEMIDE 80 MG: 10 INJECTION, SOLUTION INTRAMUSCULAR; INTRAVENOUS at 08:57

## 2022-01-01 RX ADMIN — GUAIFENESIN 600 MG: 600 TABLET, EXTENDED RELEASE ORAL at 20:42

## 2022-01-01 RX ADMIN — SODIUM CHLORIDE, POTASSIUM CHLORIDE, SODIUM LACTATE AND CALCIUM CHLORIDE: 600; 310; 30; 20 INJECTION, SOLUTION INTRAVENOUS at 22:00

## 2022-01-01 RX ADMIN — ENOXAPARIN SODIUM 30 MG: 100 INJECTION SUBCUTANEOUS at 08:38

## 2022-01-01 RX ADMIN — Medication 7 MG/HR: at 00:45

## 2022-01-01 RX ADMIN — SPIRONOLACTONE 25 MG: 25 TABLET ORAL at 08:05

## 2022-01-01 RX ADMIN — INSULIN LISPRO 4 UNITS: 100 INJECTION, SOLUTION INTRAVENOUS; SUBCUTANEOUS at 16:12

## 2022-01-01 RX ADMIN — ALBUTEROL SULFATE 2.5 MG: 2.5 SOLUTION RESPIRATORY (INHALATION) at 15:18

## 2022-01-01 RX ADMIN — GABAPENTIN 400 MG: 400 CAPSULE ORAL at 20:20

## 2022-01-01 RX ADMIN — SODIUM CHLORIDE, PRESERVATIVE FREE 10 ML: 5 INJECTION INTRAVENOUS at 08:29

## 2022-01-01 RX ADMIN — ALBUTEROL SULFATE 2.5 MG: 2.5 SOLUTION RESPIRATORY (INHALATION) at 08:24

## 2022-01-01 RX ADMIN — MINERAL OIL AND WHITE PETROLATUM: 150; 830 OINTMENT OPHTHALMIC at 19:49

## 2022-01-01 RX ADMIN — IPRATROPIUM BROMIDE AND ALBUTEROL SULFATE 1 AMPULE: .5; 3 SOLUTION RESPIRATORY (INHALATION) at 07:53

## 2022-01-01 RX ADMIN — INSULIN LISPRO 1 UNITS: 100 INJECTION, SOLUTION INTRAVENOUS; SUBCUTANEOUS at 08:30

## 2022-01-01 RX ADMIN — FERROUS SULFATE TAB EC 325 MG (65 MG FE EQUIVALENT) 325 MG: 325 (65 FE) TABLET DELAYED RESPONSE at 08:25

## 2022-01-01 RX ADMIN — PROPOFOL 25 MCG/KG/MIN: 10 INJECTION, EMULSION INTRAVENOUS at 09:00

## 2022-01-01 RX ADMIN — HEPARIN SODIUM 5000 UNITS: 5000 INJECTION INTRAVENOUS; SUBCUTANEOUS at 05:53

## 2022-01-01 RX ADMIN — BUMETANIDE 2 MG: 0.25 INJECTION INTRAMUSCULAR; INTRAVENOUS at 22:12

## 2022-01-01 RX ADMIN — ISOSORBIDE DINITRATE 20 MG: 10 TABLET ORAL at 16:59

## 2022-01-01 RX ADMIN — MYCOPHENOLATE MOFETIL 300 MG: 500 TABLET ORAL at 14:20

## 2022-01-01 RX ADMIN — OXYCODONE HYDROCHLORIDE AND ACETAMINOPHEN 1 TABLET: 5; 325 TABLET ORAL at 17:18

## 2022-01-01 RX ADMIN — FOLIC ACID 1 MG: 1 TABLET ORAL at 09:11

## 2022-01-01 RX ADMIN — MYCOPHENOLATE MOFETIL 300 MG: 500 TABLET ORAL at 20:14

## 2022-01-01 RX ADMIN — GABAPENTIN 300 MG: 600 TABLET ORAL at 08:58

## 2022-01-01 RX ADMIN — MIDODRINE HYDROCHLORIDE 5 MG: 5 TABLET ORAL at 12:09

## 2022-01-01 RX ADMIN — HEPARIN SODIUM 5000 UNITS: 5000 INJECTION INTRAVENOUS; SUBCUTANEOUS at 05:38

## 2022-01-01 RX ADMIN — OXYCODONE HYDROCHLORIDE AND ACETAMINOPHEN 1 TABLET: 5; 325 TABLET ORAL at 09:42

## 2022-01-01 RX ADMIN — HYDRALAZINE HYDROCHLORIDE 25 MG: 25 TABLET ORAL at 22:59

## 2022-01-01 RX ADMIN — FOLIC ACID 1 MG: 1 TABLET ORAL at 08:57

## 2022-01-01 RX ADMIN — PROPOFOL 30 MCG/KG/MIN: 10 INJECTION, EMULSION INTRAVENOUS at 17:11

## 2022-01-01 RX ADMIN — GUAIFENESIN 600 MG: 600 TABLET, EXTENDED RELEASE ORAL at 20:57

## 2022-01-01 RX ADMIN — FOLIC ACID 1 MG: 1 TABLET ORAL at 08:14

## 2022-01-01 RX ADMIN — ISOSORBIDE DINITRATE 20 MG: 10 TABLET ORAL at 13:18

## 2022-01-01 RX ADMIN — BUMETANIDE 2 MG: 1 TABLET ORAL at 20:00

## 2022-01-01 RX ADMIN — IPRATROPIUM BROMIDE AND ALBUTEROL SULFATE 1 AMPULE: .5; 3 SOLUTION RESPIRATORY (INHALATION) at 08:08

## 2022-01-01 RX ADMIN — SPIRONOLACTONE 25 MG: 25 TABLET ORAL at 08:02

## 2022-01-01 RX ADMIN — INSULIN LISPRO 3 UNITS: 100 INJECTION, SOLUTION INTRAVENOUS; SUBCUTANEOUS at 12:14

## 2022-01-01 RX ADMIN — VANCOMYCIN HYDROCHLORIDE 2500 MG: 1 INJECTION, POWDER, LYOPHILIZED, FOR SOLUTION INTRAVENOUS at 02:33

## 2022-01-01 RX ADMIN — Medication 1000 MCG: at 08:59

## 2022-01-01 RX ADMIN — DIAZEPAM 5 MG: 5 TABLET ORAL at 23:21

## 2022-01-01 RX ADMIN — SPIRONOLACTONE 25 MG: 25 TABLET ORAL at 08:24

## 2022-01-01 RX ADMIN — SODIUM CHLORIDE, POTASSIUM CHLORIDE, SODIUM LACTATE AND CALCIUM CHLORIDE: 600; 310; 30; 20 INJECTION, SOLUTION INTRAVENOUS at 16:30

## 2022-01-01 RX ADMIN — MIDODRINE HYDROCHLORIDE 5 MG: 5 TABLET ORAL at 16:05

## 2022-01-01 RX ADMIN — ISOSORBIDE DINITRATE 20 MG: 10 TABLET ORAL at 08:01

## 2022-01-01 RX ADMIN — INSULIN LISPRO 12 UNITS: 100 INJECTION, SOLUTION INTRAVENOUS; SUBCUTANEOUS at 16:58

## 2022-01-01 RX ADMIN — INSULIN LISPRO 3 UNITS: 100 INJECTION, SOLUTION INTRAVENOUS; SUBCUTANEOUS at 16:38

## 2022-01-01 RX ADMIN — OXYCODONE HYDROCHLORIDE AND ACETAMINOPHEN 1 TABLET: 5; 325 TABLET ORAL at 17:38

## 2022-01-01 RX ADMIN — LORAZEPAM 1 MG: 2 INJECTION INTRAMUSCULAR at 11:29

## 2022-01-01 RX ADMIN — MYCOPHENOLATE MOFETIL 300 MG: 500 TABLET ORAL at 08:59

## 2022-01-01 RX ADMIN — BUDESONIDE AND FORMOTEROL FUMARATE DIHYDRATE 2 PUFF: 160; 4.5 AEROSOL RESPIRATORY (INHALATION) at 07:56

## 2022-01-01 RX ADMIN — ISOSORBIDE DINITRATE 20 MG: 10 TABLET ORAL at 08:36

## 2022-01-01 RX ADMIN — SERTRALINE 100 MG: 50 TABLET, FILM COATED ORAL at 08:16

## 2022-01-01 RX ADMIN — OXYCODONE HYDROCHLORIDE AND ACETAMINOPHEN 1 TABLET: 5; 325 TABLET ORAL at 09:56

## 2022-01-01 RX ADMIN — DIAZEPAM 5 MG: 5 TABLET ORAL at 23:43

## 2022-01-01 RX ADMIN — SUCCINYLCHOLINE CHLORIDE 120 MG: 20 INJECTION, SOLUTION INTRAMUSCULAR; INTRAVENOUS at 07:40

## 2022-01-01 RX ADMIN — Medication 1000 MCG: at 12:00

## 2022-01-01 RX ADMIN — HEPARIN SODIUM 5000 UNITS: 5000 INJECTION INTRAVENOUS; SUBCUTANEOUS at 20:26

## 2022-01-01 RX ADMIN — HYDRALAZINE HYDROCHLORIDE 25 MG: 25 TABLET ORAL at 20:44

## 2022-01-01 RX ADMIN — SERTRALINE 100 MG: 50 TABLET, FILM COATED ORAL at 09:37

## 2022-01-01 RX ADMIN — LEVOFLOXACIN 750 MG: 5 INJECTION, SOLUTION INTRAVENOUS at 13:41

## 2022-01-01 RX ADMIN — PREDNISONE 20 MG: 20 TABLET ORAL at 09:49

## 2022-01-01 RX ADMIN — HEPARIN SODIUM 5000 UNITS: 5000 INJECTION INTRAVENOUS; SUBCUTANEOUS at 22:05

## 2022-01-01 RX ADMIN — FOLIC ACID 1 MG: 1 TABLET ORAL at 08:58

## 2022-01-01 RX ADMIN — MYCOPHENOLATE MOFETIL 300 MG: 500 TABLET ORAL at 19:53

## 2022-01-01 RX ADMIN — INSULIN LISPRO 1 UNITS: 100 INJECTION, SOLUTION INTRAVENOUS; SUBCUTANEOUS at 16:59

## 2022-01-01 RX ADMIN — ISOSORBIDE DINITRATE 20 MG: 10 TABLET ORAL at 13:20

## 2022-01-01 RX ADMIN — ENOXAPARIN SODIUM 40 MG: 40 INJECTION SUBCUTANEOUS at 08:24

## 2022-01-01 RX ADMIN — PANTOPRAZOLE SODIUM 40 MG: 40 TABLET, DELAYED RELEASE ORAL at 08:02

## 2022-01-01 RX ADMIN — CETIRIZINE HYDROCHLORIDE 10 MG: 10 TABLET ORAL at 08:36

## 2022-01-01 RX ADMIN — DIAZEPAM 5 MG: 5 TABLET ORAL at 23:27

## 2022-01-01 RX ADMIN — Medication 1000 MCG: at 08:16

## 2022-01-01 RX ADMIN — ISOSORBIDE DINITRATE 20 MG: 10 TABLET ORAL at 20:00

## 2022-01-01 RX ADMIN — FOLIC ACID 1 MG: 1 TABLET ORAL at 08:05

## 2022-01-01 RX ADMIN — GUAIFENESIN 600 MG: 600 TABLET, EXTENDED RELEASE ORAL at 20:15

## 2022-01-01 RX ADMIN — Medication 1 CAPSULE: at 09:17

## 2022-01-01 RX ADMIN — HEPARIN SODIUM 5000 UNITS: 5000 INJECTION INTRAVENOUS; SUBCUTANEOUS at 20:29

## 2022-01-01 RX ADMIN — ACETAMINOPHEN 650 MG: 325 TABLET ORAL at 09:05

## 2022-01-01 RX ADMIN — SODIUM CHLORIDE, PRESERVATIVE FREE 10 ML: 5 INJECTION INTRAVENOUS at 19:49

## 2022-01-01 RX ADMIN — BUDESONIDE AND FORMOTEROL FUMARATE DIHYDRATE 2 PUFF: 160; 4.5 AEROSOL RESPIRATORY (INHALATION) at 21:20

## 2022-01-01 RX ADMIN — FUROSEMIDE 40 MG: 10 INJECTION, SOLUTION INTRAMUSCULAR; INTRAVENOUS at 09:57

## 2022-01-01 RX ADMIN — INSULIN LISPRO 1 UNITS: 100 INJECTION, SOLUTION INTRAVENOUS; SUBCUTANEOUS at 16:33

## 2022-01-01 RX ADMIN — SERTRALINE 100 MG: 50 TABLET, FILM COATED ORAL at 09:49

## 2022-01-01 RX ADMIN — ISOSORBIDE DINITRATE 20 MG: 10 TABLET ORAL at 08:08

## 2022-01-01 RX ADMIN — FERROUS SULFATE TAB EC 325 MG (65 MG FE EQUIVALENT) 325 MG: 325 (65 FE) TABLET DELAYED RESPONSE at 17:02

## 2022-01-01 RX ADMIN — MYCOPHENOLATE MOFETIL 300 MG: 500 TABLET ORAL at 19:46

## 2022-01-01 RX ADMIN — SODIUM CHLORIDE, PRESERVATIVE FREE 10 ML: 5 INJECTION INTRAVENOUS at 21:47

## 2022-01-01 RX ADMIN — OXYCODONE HYDROCHLORIDE AND ACETAMINOPHEN 1 TABLET: 5; 325 TABLET ORAL at 10:16

## 2022-01-01 RX ADMIN — ISOSORBIDE DINITRATE 20 MG: 10 TABLET ORAL at 17:45

## 2022-01-01 RX ADMIN — FOLIC ACID 1 MG: 1 TABLET ORAL at 08:03

## 2022-01-01 RX ADMIN — ENOXAPARIN SODIUM 30 MG: 100 INJECTION SUBCUTANEOUS at 20:44

## 2022-01-01 RX ADMIN — Medication 1000 MCG: at 09:20

## 2022-01-01 RX ADMIN — MORPHINE SULFATE 1 MG: 2 INJECTION, SOLUTION INTRAMUSCULAR; INTRAVENOUS at 09:33

## 2022-01-01 RX ADMIN — FOLIC ACID 1 MG: 1 TABLET ORAL at 08:01

## 2022-01-01 RX ADMIN — GABAPENTIN 300 MG: 600 TABLET ORAL at 08:31

## 2022-01-01 RX ADMIN — INSULIN GLARGINE 20 UNITS: 100 INJECTION, SOLUTION SUBCUTANEOUS at 21:00

## 2022-01-01 RX ADMIN — FLUTICASONE PROPIONATE 1 SPRAY: 50 SPRAY, METERED NASAL at 08:08

## 2022-01-01 RX ADMIN — HEPARIN SODIUM 5000 UNITS: 5000 INJECTION INTRAVENOUS; SUBCUTANEOUS at 22:01

## 2022-01-01 RX ADMIN — FUROSEMIDE 40 MG: 10 INJECTION, SOLUTION INTRAMUSCULAR; INTRAVENOUS at 18:54

## 2022-01-01 RX ADMIN — ISOSORBIDE DINITRATE 20 MG: 10 TABLET ORAL at 20:43

## 2022-01-01 RX ADMIN — BUMETANIDE 2 MG: 1 TABLET ORAL at 19:30

## 2022-01-01 RX ADMIN — OXYCODONE HYDROCHLORIDE AND ACETAMINOPHEN 2 TABLET: 5; 325 TABLET ORAL at 12:00

## 2022-01-01 RX ADMIN — INSULIN LISPRO 6 UNITS: 100 INJECTION, SOLUTION INTRAVENOUS; SUBCUTANEOUS at 08:11

## 2022-01-01 RX ADMIN — SODIUM CHLORIDE, PRESERVATIVE FREE 10 ML: 5 INJECTION INTRAVENOUS at 09:41

## 2022-01-01 RX ADMIN — FERROUS SULFATE TAB EC 325 MG (65 MG FE EQUIVALENT) 325 MG: 325 (65 FE) TABLET DELAYED RESPONSE at 16:54

## 2022-01-01 RX ADMIN — SODIUM CHLORIDE 10 ML/HR: 9 INJECTION, SOLUTION INTRAVENOUS at 00:23

## 2022-01-01 RX ADMIN — ISOSORBIDE DINITRATE 20 MG: 10 TABLET ORAL at 12:06

## 2022-01-01 RX ADMIN — SERTRALINE 100 MG: 50 TABLET, FILM COATED ORAL at 08:36

## 2022-01-01 RX ADMIN — POTASSIUM CHLORIDE 20 MEQ: 20 TABLET, EXTENDED RELEASE ORAL at 17:46

## 2022-01-01 RX ADMIN — ISOSORBIDE DINITRATE 20 MG: 10 TABLET ORAL at 21:16

## 2022-01-01 RX ADMIN — OXYCODONE HYDROCHLORIDE AND ACETAMINOPHEN 2 TABLET: 5; 325 TABLET ORAL at 20:15

## 2022-01-01 RX ADMIN — Medication 1000 MCG: at 08:41

## 2022-01-01 RX ADMIN — ISOSORBIDE DINITRATE 20 MG: 10 TABLET ORAL at 14:48

## 2022-01-01 RX ADMIN — BUDESONIDE AND FORMOTEROL FUMARATE DIHYDRATE 2 PUFF: 160; 4.5 AEROSOL RESPIRATORY (INHALATION) at 20:27

## 2022-01-01 RX ADMIN — ISOSORBIDE DINITRATE 20 MG: 10 TABLET ORAL at 09:10

## 2022-01-01 RX ADMIN — SERTRALINE 100 MG: 50 TABLET, FILM COATED ORAL at 08:09

## 2022-01-01 RX ADMIN — GUAIFENESIN 600 MG: 600 TABLET, EXTENDED RELEASE ORAL at 09:34

## 2022-01-01 RX ADMIN — DOCUSATE SODIUM 50 MG AND SENNOSIDES 8.6 MG 2 TABLET: 8.6; 5 TABLET, FILM COATED ORAL at 21:01

## 2022-01-01 RX ADMIN — IPRATROPIUM BROMIDE AND ALBUTEROL SULFATE 1 AMPULE: .5; 3 SOLUTION RESPIRATORY (INHALATION) at 15:41

## 2022-01-01 RX ADMIN — DEXAMETHASONE SODIUM PHOSPHATE 6 MG: 10 INJECTION INTRAMUSCULAR; INTRAVENOUS at 09:00

## 2022-01-01 RX ADMIN — SALINE NASAL SPRAY 1 SPRAY: 1.5 SOLUTION NASAL at 12:05

## 2022-01-01 RX ADMIN — HYDRALAZINE HYDROCHLORIDE 25 MG: 25 TABLET ORAL at 14:42

## 2022-01-01 RX ADMIN — ISOSORBIDE DINITRATE 20 MG: 10 TABLET ORAL at 09:39

## 2022-01-01 RX ADMIN — CISATRACURIUM BESYLATE 2.5 MCG/KG/MIN: 10 INJECTION, SOLUTION INTRAVENOUS at 12:38

## 2022-01-01 RX ADMIN — BUMETANIDE 2 MG: 1 TABLET ORAL at 21:44

## 2022-01-01 RX ADMIN — MINERAL OIL AND WHITE PETROLATUM: 150; 830 OINTMENT OPHTHALMIC at 12:48

## 2022-01-01 RX ADMIN — LOPERAMIDE HYDROCHLORIDE 2 MG: 2 CAPSULE ORAL at 03:54

## 2022-01-01 RX ADMIN — HYDRALAZINE HYDROCHLORIDE 25 MG: 25 TABLET ORAL at 05:51

## 2022-01-01 RX ADMIN — BUMETANIDE 1 MG: 0.25 INJECTION, SOLUTION INTRAMUSCULAR; INTRAVENOUS at 20:42

## 2022-01-01 RX ADMIN — SALINE NASAL SPRAY 1 SPRAY: 1.5 SOLUTION NASAL at 22:16

## 2022-01-01 RX ADMIN — BUDESONIDE AND FORMOTEROL FUMARATE DIHYDRATE 2 PUFF: 160; 4.5 AEROSOL RESPIRATORY (INHALATION) at 08:50

## 2022-01-01 RX ADMIN — AMLODIPINE BESYLATE 5 MG: 5 TABLET ORAL at 16:50

## 2022-01-01 RX ADMIN — MYCOPHENOLATE MOFETIL 300 MG: 500 TABLET ORAL at 08:25

## 2022-01-01 RX ADMIN — BUDESONIDE AND FORMOTEROL FUMARATE DIHYDRATE 2 PUFF: 160; 4.5 AEROSOL RESPIRATORY (INHALATION) at 21:51

## 2022-01-01 RX ADMIN — INSULIN LISPRO 3 UNITS: 100 INJECTION, SOLUTION INTRAVENOUS; SUBCUTANEOUS at 11:28

## 2022-01-01 RX ADMIN — LORAZEPAM 1 MG: 2 INJECTION INTRAMUSCULAR at 22:20

## 2022-01-01 RX ADMIN — HEPARIN SODIUM 5000 UNITS: 5000 INJECTION INTRAVENOUS; SUBCUTANEOUS at 14:07

## 2022-01-01 RX ADMIN — SODIUM CHLORIDE, PRESERVATIVE FREE 10 ML: 5 INJECTION INTRAVENOUS at 08:37

## 2022-01-01 RX ADMIN — SODIUM CHLORIDE, PRESERVATIVE FREE 10 ML: 5 INJECTION INTRAVENOUS at 20:00

## 2022-01-01 RX ADMIN — FOLIC ACID 1 MG: 1 TABLET ORAL at 09:38

## 2022-01-01 RX ADMIN — LOPERAMIDE HYDROCHLORIDE 2 MG: 2 CAPSULE ORAL at 21:20

## 2022-01-01 RX ADMIN — ALBUTEROL SULFATE 2.5 MG: 2.5 SOLUTION RESPIRATORY (INHALATION) at 12:38

## 2022-01-01 RX ADMIN — LOPERAMIDE HYDROCHLORIDE 2 MG: 2 CAPSULE ORAL at 20:26

## 2022-01-01 RX ADMIN — INSULIN GLARGINE 20 UNITS: 100 INJECTION, SOLUTION SUBCUTANEOUS at 20:47

## 2022-01-01 RX ADMIN — INSULIN LISPRO 6 UNITS: 100 INJECTION, SOLUTION INTRAVENOUS; SUBCUTANEOUS at 12:00

## 2022-01-01 RX ADMIN — FUROSEMIDE 40 MG: 10 INJECTION, SOLUTION INTRAMUSCULAR; INTRAVENOUS at 08:27

## 2022-01-01 RX ADMIN — HEPARIN SODIUM 5000 UNITS: 5000 INJECTION INTRAVENOUS; SUBCUTANEOUS at 06:00

## 2022-01-01 RX ADMIN — SERTRALINE 100 MG: 50 TABLET, FILM COATED ORAL at 08:51

## 2022-01-01 RX ADMIN — FUROSEMIDE 80 MG: 10 INJECTION, SOLUTION INTRAMUSCULAR; INTRAVENOUS at 09:10

## 2022-01-01 RX ADMIN — BUDESONIDE AND FORMOTEROL FUMARATE DIHYDRATE 2 PUFF: 160; 4.5 AEROSOL RESPIRATORY (INHALATION) at 20:35

## 2022-01-01 RX ADMIN — HEPARIN SODIUM 5000 UNITS: 5000 INJECTION INTRAVENOUS; SUBCUTANEOUS at 22:10

## 2022-01-01 RX ADMIN — OXYCODONE HYDROCHLORIDE AND ACETAMINOPHEN 1 TABLET: 5; 325 TABLET ORAL at 16:37

## 2022-01-01 RX ADMIN — OXYCODONE HYDROCHLORIDE AND ACETAMINOPHEN 2 TABLET: 5; 325 TABLET ORAL at 08:51

## 2022-01-01 RX ADMIN — FUROSEMIDE 40 MG: 10 INJECTION, SOLUTION INTRAMUSCULAR; INTRAVENOUS at 18:31

## 2022-01-01 RX ADMIN — INSULIN LISPRO 3 UNITS: 100 INJECTION, SOLUTION INTRAVENOUS; SUBCUTANEOUS at 12:58

## 2022-01-01 RX ADMIN — SODIUM CHLORIDE 10 ML/HR: 9 INJECTION, SOLUTION INTRAVENOUS at 02:09

## 2022-01-01 RX ADMIN — FUROSEMIDE 80 MG: 10 INJECTION, SOLUTION INTRAMUSCULAR; INTRAVENOUS at 16:54

## 2022-01-01 RX ADMIN — GABAPENTIN 400 MG: 400 CAPSULE ORAL at 16:00

## 2022-01-01 RX ADMIN — FERROUS SULFATE TAB EC 325 MG (65 MG FE EQUIVALENT) 325 MG: 325 (65 FE) TABLET DELAYED RESPONSE at 08:41

## 2022-01-01 RX ADMIN — ACETAMINOPHEN 650 MG: 325 TABLET ORAL at 11:56

## 2022-01-01 RX ADMIN — INSULIN LISPRO 9 UNITS: 100 INJECTION, SOLUTION INTRAVENOUS; SUBCUTANEOUS at 11:50

## 2022-01-01 RX ADMIN — HYDRALAZINE HYDROCHLORIDE 25 MG: 25 TABLET ORAL at 14:39

## 2022-01-01 RX ADMIN — SERTRALINE 100 MG: 50 TABLET, FILM COATED ORAL at 08:14

## 2022-01-01 RX ADMIN — IPRATROPIUM BROMIDE AND ALBUTEROL SULFATE 1 AMPULE: .5; 3 SOLUTION RESPIRATORY (INHALATION) at 21:22

## 2022-01-01 RX ADMIN — SODIUM CHLORIDE, PRESERVATIVE FREE 10 ML: 5 INJECTION INTRAVENOUS at 21:42

## 2022-01-01 RX ADMIN — EPINEPHRINE 1 MCG/MIN: 1 INJECTION INTRAMUSCULAR; INTRAVENOUS; SUBCUTANEOUS at 01:49

## 2022-01-01 RX ADMIN — GUAIFENESIN 600 MG: 600 TABLET, EXTENDED RELEASE ORAL at 08:46

## 2022-01-01 RX ADMIN — HYDRALAZINE HYDROCHLORIDE 25 MG: 25 TABLET ORAL at 05:25

## 2022-01-01 RX ADMIN — DESMOPRESSIN ACETATE 40 MG: 0.2 TABLET ORAL at 22:16

## 2022-01-01 RX ADMIN — OXYCODONE HYDROCHLORIDE AND ACETAMINOPHEN 1 TABLET: 5; 325 TABLET ORAL at 20:42

## 2022-01-01 RX ADMIN — DESMOPRESSIN ACETATE 40 MG: 0.2 TABLET ORAL at 20:04

## 2022-01-01 RX ADMIN — HEPARIN SODIUM 5000 UNITS: 5000 INJECTION INTRAVENOUS; SUBCUTANEOUS at 22:34

## 2022-01-01 RX ADMIN — FUROSEMIDE 40 MG: 10 INJECTION, SOLUTION INTRAMUSCULAR; INTRAVENOUS at 07:45

## 2022-01-01 RX ADMIN — ENOXAPARIN SODIUM 30 MG: 100 INJECTION SUBCUTANEOUS at 21:06

## 2022-01-01 RX ADMIN — MIDODRINE HYDROCHLORIDE 10 MG: 5 TABLET ORAL at 20:29

## 2022-01-01 RX ADMIN — CEFTRIAXONE SODIUM 2000 MG: 2 INJECTION, POWDER, FOR SOLUTION INTRAMUSCULAR; INTRAVENOUS at 12:09

## 2022-01-01 RX ADMIN — FERROUS SULFATE TAB EC 325 MG (65 MG FE EQUIVALENT) 325 MG: 325 (65 FE) TABLET DELAYED RESPONSE at 17:14

## 2022-01-01 RX ADMIN — ISOSORBIDE DINITRATE 20 MG: 10 TABLET ORAL at 17:02

## 2022-01-01 RX ADMIN — TIOTROPIUM BROMIDE INHALATION SPRAY 2 PUFF: 3.12 SPRAY, METERED RESPIRATORY (INHALATION) at 12:02

## 2022-01-01 RX ADMIN — BUDESONIDE AND FORMOTEROL FUMARATE DIHYDRATE 2 PUFF: 160; 4.5 AEROSOL RESPIRATORY (INHALATION) at 19:42

## 2022-01-01 RX ADMIN — ISOSORBIDE DINITRATE 20 MG: 10 TABLET ORAL at 08:53

## 2022-01-01 RX ADMIN — INSULIN LISPRO 4 UNITS: 100 INJECTION, SOLUTION INTRAVENOUS; SUBCUTANEOUS at 17:22

## 2022-01-01 RX ADMIN — GUAIFENESIN 600 MG: 600 TABLET, EXTENDED RELEASE ORAL at 08:51

## 2022-01-01 RX ADMIN — Medication 1000 MCG: at 08:04

## 2022-01-01 RX ADMIN — SALINE NASAL SPRAY 1 SPRAY: 1.5 SOLUTION NASAL at 08:59

## 2022-01-01 RX ADMIN — OXYCODONE HYDROCHLORIDE AND ACETAMINOPHEN 1 TABLET: 5; 325 TABLET ORAL at 15:55

## 2022-01-01 RX ADMIN — BUDESONIDE AND FORMOTEROL FUMARATE DIHYDRATE 2 PUFF: 160; 4.5 AEROSOL RESPIRATORY (INHALATION) at 21:39

## 2022-01-01 RX ADMIN — FLUTICASONE PROPIONATE 1 SPRAY: 50 SPRAY, METERED NASAL at 08:30

## 2022-01-01 RX ADMIN — BUDESONIDE AND FORMOTEROL FUMARATE DIHYDRATE 2 PUFF: 160; 4.5 AEROSOL RESPIRATORY (INHALATION) at 08:10

## 2022-01-01 RX ADMIN — ENOXAPARIN SODIUM 30 MG: 100 INJECTION SUBCUTANEOUS at 08:58

## 2022-01-01 RX ADMIN — INSULIN LISPRO 5 UNITS: 100 INJECTION, SOLUTION INTRAVENOUS; SUBCUTANEOUS at 20:47

## 2022-01-01 RX ADMIN — PANTOPRAZOLE SODIUM 40 MG: 40 TABLET, DELAYED RELEASE ORAL at 05:33

## 2022-01-01 RX ADMIN — INSULIN LISPRO 1 UNITS: 100 INJECTION, SOLUTION INTRAVENOUS; SUBCUTANEOUS at 17:01

## 2022-01-01 RX ADMIN — ALBUTEROL SULFATE 2.5 MG: 2.5 SOLUTION RESPIRATORY (INHALATION) at 11:26

## 2022-01-01 RX ADMIN — SODIUM CHLORIDE, PRESERVATIVE FREE 20 MG: 5 INJECTION INTRAVENOUS at 21:01

## 2022-01-01 RX ADMIN — Medication 1 CAPSULE: at 08:15

## 2022-01-01 RX ADMIN — ISOSORBIDE DINITRATE 20 MG: 10 TABLET ORAL at 08:26

## 2022-01-01 RX ADMIN — AMLODIPINE BESYLATE 5 MG: 5 TABLET ORAL at 08:10

## 2022-01-01 RX ADMIN — ACETAMINOPHEN 650 MG: 325 TABLET ORAL at 16:04

## 2022-01-01 RX ADMIN — INSULIN LISPRO 1 UNITS: 100 INJECTION, SOLUTION INTRAVENOUS; SUBCUTANEOUS at 08:00

## 2022-01-01 RX ADMIN — FOLIC ACID 1 MG: 1 TABLET ORAL at 09:59

## 2022-01-01 RX ADMIN — GABAPENTIN 300 MG: 600 TABLET ORAL at 16:51

## 2022-01-01 RX ADMIN — GABAPENTIN 300 MG: 600 TABLET ORAL at 14:54

## 2022-01-01 RX ADMIN — FUROSEMIDE 60 MG: 10 INJECTION, SOLUTION INTRAMUSCULAR; INTRAVENOUS at 18:29

## 2022-01-01 RX ADMIN — GABAPENTIN 300 MG: 600 TABLET ORAL at 08:23

## 2022-01-01 RX ADMIN — HEPARIN SODIUM 5000 UNITS: 5000 INJECTION INTRAVENOUS; SUBCUTANEOUS at 20:32

## 2022-01-01 RX ADMIN — SODIUM CHLORIDE, PRESERVATIVE FREE 10 ML: 5 INJECTION INTRAVENOUS at 22:34

## 2022-01-01 RX ADMIN — ISOSORBIDE DINITRATE 20 MG: 10 TABLET ORAL at 18:27

## 2022-01-01 RX ADMIN — OXYCODONE 5 MG: 5 TABLET ORAL at 23:16

## 2022-01-01 RX ADMIN — OXYCODONE HYDROCHLORIDE AND ACETAMINOPHEN 1 TABLET: 5; 325 TABLET ORAL at 09:18

## 2022-01-01 RX ADMIN — FLUTICASONE PROPIONATE 1 SPRAY: 50 SPRAY, METERED NASAL at 20:41

## 2022-01-01 RX ADMIN — FLUCONAZOLE 150 MG: 100 TABLET ORAL at 16:51

## 2022-01-01 RX ADMIN — MYCOPHENOLATE MOFETIL 300 MG: 500 TABLET ORAL at 15:30

## 2022-01-01 RX ADMIN — INSULIN LISPRO 9 UNITS: 100 INJECTION, SOLUTION INTRAVENOUS; SUBCUTANEOUS at 23:55

## 2022-01-01 RX ADMIN — SODIUM CHLORIDE, PRESERVATIVE FREE 10 ML: 5 INJECTION INTRAVENOUS at 09:34

## 2022-01-01 RX ADMIN — HYDRALAZINE HYDROCHLORIDE 25 MG: 25 TABLET ORAL at 14:54

## 2022-01-01 RX ADMIN — ALBUTEROL SULFATE 2 PUFF: 90 AEROSOL, METERED RESPIRATORY (INHALATION) at 07:36

## 2022-01-01 RX ADMIN — OXYCODONE HYDROCHLORIDE AND ACETAMINOPHEN 1 TABLET: 5; 325 TABLET ORAL at 00:52

## 2022-01-01 RX ADMIN — HYDRALAZINE HYDROCHLORIDE 25 MG: 25 TABLET ORAL at 12:30

## 2022-01-01 RX ADMIN — SODIUM CHLORIDE, PRESERVATIVE FREE 10 ML: 5 INJECTION INTRAVENOUS at 08:30

## 2022-01-01 RX ADMIN — LORAZEPAM 1 MG: 2 INJECTION INTRAMUSCULAR at 09:38

## 2022-01-01 RX ADMIN — LORAZEPAM 1 MG: 2 INJECTION INTRAMUSCULAR at 22:37

## 2022-01-01 RX ADMIN — BUMETANIDE 2 MG: 0.25 INJECTION INTRAMUSCULAR; INTRAVENOUS at 10:41

## 2022-01-01 RX ADMIN — INSULIN LISPRO 2 UNITS: 100 INJECTION, SOLUTION INTRAVENOUS; SUBCUTANEOUS at 16:20

## 2022-01-01 RX ADMIN — BUMETANIDE 1 MG: 0.25 INJECTION, SOLUTION INTRAMUSCULAR; INTRAVENOUS at 20:21

## 2022-01-01 RX ADMIN — ISOSORBIDE DINITRATE 20 MG: 10 TABLET ORAL at 07:56

## 2022-01-01 RX ADMIN — MYCOPHENOLATE MOFETIL 300 MG: 500 TABLET ORAL at 14:39

## 2022-01-01 RX ADMIN — HEPARIN SODIUM 5000 UNITS: 5000 INJECTION INTRAVENOUS; SUBCUTANEOUS at 22:23

## 2022-01-01 RX ADMIN — HYDRALAZINE HYDROCHLORIDE 25 MG: 25 TABLET ORAL at 21:04

## 2022-01-01 RX ADMIN — SPIRONOLACTONE 25 MG: 25 TABLET ORAL at 08:51

## 2022-01-01 RX ADMIN — ISOSORBIDE DINITRATE 20 MG: 10 TABLET ORAL at 07:45

## 2022-01-01 RX ADMIN — DESMOPRESSIN ACETATE 40 MG: 0.2 TABLET ORAL at 02:56

## 2022-01-01 RX ADMIN — HYDRALAZINE HYDROCHLORIDE 25 MG: 25 TABLET ORAL at 13:13

## 2022-01-01 RX ADMIN — BUDESONIDE AND FORMOTEROL FUMARATE DIHYDRATE 2 PUFF: 160; 4.5 AEROSOL RESPIRATORY (INHALATION) at 08:34

## 2022-01-01 RX ADMIN — HEPARIN SODIUM 5000 UNITS: 5000 INJECTION INTRAVENOUS; SUBCUTANEOUS at 16:04

## 2022-01-01 RX ADMIN — GUAIFENESIN 600 MG: 600 TABLET, EXTENDED RELEASE ORAL at 22:03

## 2022-01-01 RX ADMIN — HYDRALAZINE HYDROCHLORIDE 25 MG: 25 TABLET ORAL at 22:37

## 2022-01-01 RX ADMIN — SODIUM CHLORIDE: 9 INJECTION, SOLUTION INTRAVENOUS at 10:39

## 2022-01-01 RX ADMIN — LINEZOLID 600 MG: 600 INJECTION, SOLUTION INTRAVENOUS at 08:52

## 2022-01-01 RX ADMIN — ALBUTEROL SULFATE 2.5 MG: 2.5 SOLUTION RESPIRATORY (INHALATION) at 22:08

## 2022-01-01 RX ADMIN — SODIUM CHLORIDE, PRESERVATIVE FREE 10 ML: 5 INJECTION INTRAVENOUS at 09:11

## 2022-01-01 RX ADMIN — ENOXAPARIN SODIUM 30 MG: 100 INJECTION SUBCUTANEOUS at 21:45

## 2022-01-01 RX ADMIN — GABAPENTIN 300 MG: 600 TABLET ORAL at 19:59

## 2022-01-01 RX ADMIN — GUAIFENESIN 600 MG: 600 TABLET, EXTENDED RELEASE ORAL at 20:22

## 2022-01-01 RX ADMIN — MYCOPHENOLATE MOFETIL 300 MG: 500 TABLET ORAL at 14:27

## 2022-01-01 RX ADMIN — HYDRALAZINE HYDROCHLORIDE 25 MG: 25 TABLET ORAL at 06:27

## 2022-01-01 RX ADMIN — ALBUTEROL SULFATE 2.5 MG: 2.5 SOLUTION RESPIRATORY (INHALATION) at 19:36

## 2022-01-01 RX ADMIN — MEROPENEM 1000 MG: 1 INJECTION, POWDER, FOR SOLUTION INTRAVENOUS at 23:04

## 2022-01-01 RX ADMIN — GABAPENTIN 300 MG: 600 TABLET ORAL at 22:16

## 2022-01-01 RX ADMIN — BUDESONIDE AND FORMOTEROL FUMARATE DIHYDRATE 2 PUFF: 160; 4.5 AEROSOL RESPIRATORY (INHALATION) at 21:16

## 2022-01-01 RX ADMIN — FLUTICASONE PROPIONATE 1 SPRAY: 50 SPRAY, METERED NASAL at 08:32

## 2022-01-01 RX ADMIN — IPRATROPIUM BROMIDE AND ALBUTEROL SULFATE 1 AMPULE: .5; 3 SOLUTION RESPIRATORY (INHALATION) at 19:54

## 2022-01-01 RX ADMIN — FUROSEMIDE 80 MG: 10 INJECTION, SOLUTION INTRAMUSCULAR; INTRAVENOUS at 08:09

## 2022-01-01 RX ADMIN — IPRATROPIUM BROMIDE AND ALBUTEROL SULFATE 1 AMPULE: .5; 3 SOLUTION RESPIRATORY (INHALATION) at 19:22

## 2022-01-01 RX ADMIN — SPIRONOLACTONE 25 MG: 25 TABLET ORAL at 08:58

## 2022-01-01 RX ADMIN — FLUTICASONE PROPIONATE 1 SPRAY: 50 SPRAY, METERED NASAL at 09:33

## 2022-01-01 RX ADMIN — MYCOPHENOLATE MOFETIL 300 MG: 500 TABLET ORAL at 20:32

## 2022-01-01 RX ADMIN — OXYCODONE HYDROCHLORIDE AND ACETAMINOPHEN 2 TABLET: 5; 325 TABLET ORAL at 14:49

## 2022-01-01 RX ADMIN — GABAPENTIN 300 MG: 600 TABLET ORAL at 20:15

## 2022-01-01 RX ADMIN — INSULIN LISPRO 3 UNITS: 100 INJECTION, SOLUTION INTRAVENOUS; SUBCUTANEOUS at 04:25

## 2022-01-01 RX ADMIN — PANTOPRAZOLE SODIUM 40 MG: 40 TABLET, DELAYED RELEASE ORAL at 08:46

## 2022-01-01 RX ADMIN — INSULIN LISPRO 9 UNITS: 100 INJECTION, SOLUTION INTRAVENOUS; SUBCUTANEOUS at 08:41

## 2022-01-01 RX ADMIN — Medication 7 MG/HR: at 23:41

## 2022-01-01 RX ADMIN — BUMETANIDE 1 MG: 1 TABLET ORAL at 20:59

## 2022-01-01 RX ADMIN — FLUTICASONE PROPIONATE 1 SPRAY: 50 SPRAY, METERED NASAL at 08:06

## 2022-01-01 RX ADMIN — DEXAMETHASONE SODIUM PHOSPHATE 6 MG: 10 INJECTION INTRAMUSCULAR; INTRAVENOUS at 13:36

## 2022-01-01 RX ADMIN — ISOSORBIDE DINITRATE 20 MG: 10 TABLET ORAL at 08:16

## 2022-01-01 RX ADMIN — LEVOFLOXACIN 750 MG: 5 INJECTION, SOLUTION INTRAVENOUS at 01:01

## 2022-01-01 RX ADMIN — HYDRALAZINE HYDROCHLORIDE 25 MG: 25 TABLET ORAL at 06:18

## 2022-01-01 RX ADMIN — POTASSIUM CHLORIDE 40 MEQ: 20 TABLET, EXTENDED RELEASE ORAL at 08:03

## 2022-01-01 RX ADMIN — PANTOPRAZOLE SODIUM 40 MG: 40 TABLET, DELAYED RELEASE ORAL at 05:20

## 2022-01-01 RX ADMIN — FERROUS SULFATE TAB EC 325 MG (65 MG FE EQUIVALENT) 325 MG: 325 (65 FE) TABLET DELAYED RESPONSE at 09:30

## 2022-01-01 RX ADMIN — METHYLPREDNISOLONE SODIUM SUCCINATE 125 MG: 125 INJECTION, POWDER, FOR SOLUTION INTRAMUSCULAR; INTRAVENOUS at 06:20

## 2022-01-01 RX ADMIN — GUAIFENESIN 600 MG: 600 TABLET, EXTENDED RELEASE ORAL at 09:37

## 2022-01-01 RX ADMIN — PANTOPRAZOLE SODIUM 40 MG: 40 TABLET, DELAYED RELEASE ORAL at 08:14

## 2022-01-01 RX ADMIN — FOLIC ACID 1 MG: 1 TABLET ORAL at 08:41

## 2022-01-01 RX ADMIN — ALBUTEROL SULFATE 2.5 MG: 5 SOLUTION RESPIRATORY (INHALATION) at 04:15

## 2022-01-01 RX ADMIN — HEPARIN SODIUM 5000 UNITS: 5000 INJECTION INTRAVENOUS; SUBCUTANEOUS at 06:38

## 2022-01-01 RX ADMIN — ALBUTEROL SULFATE 2.5 MG: 2.5 SOLUTION RESPIRATORY (INHALATION) at 12:23

## 2022-01-01 RX ADMIN — FOLIC ACID 1 MG: 1 TABLET ORAL at 08:25

## 2022-01-01 RX ADMIN — GABAPENTIN 300 MG: 600 TABLET ORAL at 16:34

## 2022-01-01 RX ADMIN — PANTOPRAZOLE SODIUM 40 MG: 40 TABLET, DELAYED RELEASE ORAL at 07:39

## 2022-01-01 RX ADMIN — ENOXAPARIN SODIUM 30 MG: 100 INJECTION SUBCUTANEOUS at 20:00

## 2022-01-01 RX ADMIN — FOLIC ACID 1 MG: 1 TABLET ORAL at 07:59

## 2022-01-01 RX ADMIN — IPRATROPIUM BROMIDE AND ALBUTEROL SULFATE 1 AMPULE: .5; 3 SOLUTION RESPIRATORY (INHALATION) at 20:40

## 2022-01-01 RX ADMIN — FUROSEMIDE 80 MG: 10 INJECTION, SOLUTION INTRAMUSCULAR; INTRAVENOUS at 17:06

## 2022-01-01 RX ADMIN — SPIRONOLACTONE 25 MG: 25 TABLET ORAL at 08:46

## 2022-01-01 RX ADMIN — LORAZEPAM 1 MG: 2 INJECTION INTRAMUSCULAR at 22:36

## 2022-01-01 RX ADMIN — MYCOPHENOLATE MOFETIL 300 MG: 500 TABLET ORAL at 13:38

## 2022-01-01 RX ADMIN — PANTOPRAZOLE SODIUM 40 MG: 40 TABLET, DELAYED RELEASE ORAL at 08:58

## 2022-01-01 RX ADMIN — ONDANSETRON 4 MG: 2 INJECTION INTRAMUSCULAR; INTRAVENOUS at 06:30

## 2022-01-01 RX ADMIN — SODIUM CHLORIDE, PRESERVATIVE FREE 10 ML: 5 INJECTION INTRAVENOUS at 19:34

## 2022-01-01 RX ADMIN — HYDRALAZINE HYDROCHLORIDE 25 MG: 25 TABLET ORAL at 06:25

## 2022-01-01 RX ADMIN — ENOXAPARIN SODIUM 40 MG: 40 INJECTION SUBCUTANEOUS at 21:01

## 2022-01-01 RX ADMIN — SERTRALINE 100 MG: 50 TABLET, FILM COATED ORAL at 07:45

## 2022-01-01 RX ADMIN — GABAPENTIN 300 MG: 600 TABLET ORAL at 13:39

## 2022-01-01 RX ADMIN — SERTRALINE 100 MG: 50 TABLET, FILM COATED ORAL at 09:27

## 2022-01-01 RX ADMIN — AMLODIPINE BESYLATE 5 MG: 5 TABLET ORAL at 09:38

## 2022-01-01 RX ADMIN — SPIRONOLACTONE 25 MG: 25 TABLET ORAL at 09:57

## 2022-01-01 RX ADMIN — Medication 1 CAPSULE: at 09:57

## 2022-01-01 RX ADMIN — SODIUM CHLORIDE, PRESERVATIVE FREE 10 ML: 5 INJECTION INTRAVENOUS at 20:22

## 2022-01-01 RX ADMIN — OXYCODONE HYDROCHLORIDE AND ACETAMINOPHEN 2 TABLET: 5; 325 TABLET ORAL at 22:37

## 2022-01-01 RX ADMIN — Medication 100 MCG/MIN: at 01:59

## 2022-01-01 RX ADMIN — BUMETANIDE 2 MG: 1 TABLET ORAL at 20:21

## 2022-01-01 RX ADMIN — OXYCODONE HYDROCHLORIDE AND ACETAMINOPHEN 2 TABLET: 5; 325 TABLET ORAL at 15:09

## 2022-01-01 RX ADMIN — INSULIN LISPRO 3 UNITS: 100 INJECTION, SOLUTION INTRAVENOUS; SUBCUTANEOUS at 17:32

## 2022-01-01 RX ADMIN — SODIUM CHLORIDE, PRESERVATIVE FREE 10 ML: 5 INJECTION INTRAVENOUS at 20:28

## 2022-01-01 RX ADMIN — LORAZEPAM 1 MG: 2 INJECTION INTRAMUSCULAR at 03:36

## 2022-01-01 RX ADMIN — SODIUM CHLORIDE, PRESERVATIVE FREE 20 MG: 5 INJECTION INTRAVENOUS at 11:10

## 2022-01-01 RX ADMIN — HEPARIN SODIUM 5000 UNITS: 5000 INJECTION INTRAVENOUS; SUBCUTANEOUS at 05:33

## 2022-01-01 RX ADMIN — INSULIN LISPRO 10 UNITS: 100 INJECTION, SOLUTION INTRAVENOUS; SUBCUTANEOUS at 16:55

## 2022-01-01 RX ADMIN — GABAPENTIN 300 MG: 600 TABLET ORAL at 17:09

## 2022-01-01 RX ADMIN — INSULIN LISPRO 3 UNITS: 100 INJECTION, SOLUTION INTRAVENOUS; SUBCUTANEOUS at 12:20

## 2022-01-01 RX ADMIN — ACETAMINOPHEN 650 MG: 325 TABLET ORAL at 23:09

## 2022-01-01 RX ADMIN — IPRATROPIUM BROMIDE AND ALBUTEROL SULFATE 1 AMPULE: .5; 3 SOLUTION RESPIRATORY (INHALATION) at 07:05

## 2022-01-01 RX ADMIN — SPIRONOLACTONE 25 MG: 25 TABLET ORAL at 09:17

## 2022-01-01 RX ADMIN — SPIRONOLACTONE 25 MG: 25 TABLET ORAL at 09:28

## 2022-01-01 RX ADMIN — BUMETANIDE 2 MG: 0.25 INJECTION, SOLUTION INTRAMUSCULAR; INTRAVENOUS at 21:08

## 2022-01-01 RX ADMIN — HEPARIN SODIUM 5000 UNITS: 5000 INJECTION INTRAVENOUS; SUBCUTANEOUS at 14:39

## 2022-01-01 RX ADMIN — GABAPENTIN 400 MG: 400 CAPSULE ORAL at 09:32

## 2022-01-01 RX ADMIN — OXYCODONE HYDROCHLORIDE AND ACETAMINOPHEN 2 TABLET: 5; 325 TABLET ORAL at 21:45

## 2022-01-01 RX ADMIN — OXYCODONE HYDROCHLORIDE AND ACETAMINOPHEN 2 TABLET: 5; 325 TABLET ORAL at 17:32

## 2022-01-01 RX ADMIN — PROPOFOL 50 MCG/KG/MIN: 10 INJECTION, EMULSION INTRAVENOUS at 11:20

## 2022-01-01 RX ADMIN — DOCUSATE SODIUM 100 MG: 100 CAPSULE ORAL at 08:58

## 2022-01-01 RX ADMIN — ISOSORBIDE DINITRATE 20 MG: 10 TABLET ORAL at 14:37

## 2022-01-01 RX ADMIN — BUMETANIDE 2 MG: 0.25 INJECTION, SOLUTION INTRAMUSCULAR; INTRAVENOUS at 22:02

## 2022-01-01 RX ADMIN — AMLODIPINE BESYLATE 5 MG: 5 TABLET ORAL at 08:25

## 2022-01-01 RX ADMIN — IPRATROPIUM BROMIDE AND ALBUTEROL SULFATE 1 AMPULE: .5; 3 SOLUTION RESPIRATORY (INHALATION) at 07:48

## 2022-01-01 RX ADMIN — SODIUM BICARBONATE 50 MEQ: 84 INJECTION, SOLUTION INTRAVENOUS at 01:56

## 2022-01-01 RX ADMIN — LORAZEPAM 1 MG: 2 INJECTION INTRAMUSCULAR at 20:29

## 2022-01-01 RX ADMIN — GUAIFENESIN 600 MG: 600 TABLET, EXTENDED RELEASE ORAL at 09:00

## 2022-01-01 RX ADMIN — POTASSIUM CHLORIDE 40 MEQ: 20 TABLET, EXTENDED RELEASE ORAL at 08:30

## 2022-01-01 RX ADMIN — ISOSORBIDE DINITRATE 20 MG: 10 TABLET ORAL at 21:00

## 2022-01-01 RX ADMIN — SODIUM CHLORIDE, PRESERVATIVE FREE 10 ML: 5 INJECTION INTRAVENOUS at 09:22

## 2022-01-01 RX ADMIN — HYDRALAZINE HYDROCHLORIDE 25 MG: 25 TABLET ORAL at 21:01

## 2022-01-01 RX ADMIN — LEVOFLOXACIN 750 MG: 5 INJECTION, SOLUTION INTRAVENOUS at 01:15

## 2022-01-01 RX ADMIN — HEPARIN SODIUM 5000 UNITS: 5000 INJECTION INTRAVENOUS; SUBCUTANEOUS at 15:38

## 2022-01-01 RX ADMIN — AMLODIPINE BESYLATE 5 MG: 5 TABLET ORAL at 08:24

## 2022-01-01 RX ADMIN — POTASSIUM CHLORIDE 20 MEQ: 20 TABLET, EXTENDED RELEASE ORAL at 08:02

## 2022-01-01 RX ADMIN — IPRATROPIUM BROMIDE AND ALBUTEROL SULFATE 1 AMPULE: .5; 3 SOLUTION RESPIRATORY (INHALATION) at 19:51

## 2022-01-01 RX ADMIN — SODIUM CHLORIDE, PRESERVATIVE FREE 10 ML: 5 INJECTION INTRAVENOUS at 21:53

## 2022-01-01 RX ADMIN — SODIUM CHLORIDE, PRESERVATIVE FREE 10 ML: 5 INJECTION INTRAVENOUS at 09:59

## 2022-01-01 RX ADMIN — SODIUM CHLORIDE, PRESERVATIVE FREE 10 ML: 5 INJECTION INTRAVENOUS at 21:04

## 2022-01-01 RX ADMIN — TIOTROPIUM BROMIDE INHALATION SPRAY 2 PUFF: 3.12 SPRAY, METERED RESPIRATORY (INHALATION) at 08:58

## 2022-01-01 RX ADMIN — FUROSEMIDE 40 MG: 10 INJECTION, SOLUTION INTRAMUSCULAR; INTRAVENOUS at 09:15

## 2022-01-01 RX ADMIN — LINEZOLID 600 MG: 600 INJECTION, SOLUTION INTRAVENOUS at 21:34

## 2022-01-01 RX ADMIN — INSULIN LISPRO 4 UNITS: 100 INJECTION, SOLUTION INTRAVENOUS; SUBCUTANEOUS at 16:00

## 2022-01-01 RX ADMIN — DEXAMETHASONE SODIUM PHOSPHATE 6 MG: 10 INJECTION INTRAMUSCULAR; INTRAVENOUS at 08:06

## 2022-01-01 RX ADMIN — PROPOFOL 25 MCG/KG/MIN: 10 INJECTION, EMULSION INTRAVENOUS at 20:19

## 2022-01-01 RX ADMIN — REMDESIVIR 100 MG: 100 INJECTION, POWDER, LYOPHILIZED, FOR SOLUTION INTRAVENOUS at 13:35

## 2022-01-01 RX ADMIN — SODIUM CHLORIDE: 9 INJECTION, SOLUTION INTRAVENOUS at 04:31

## 2022-01-01 RX ADMIN — Medication 1 CAPSULE: at 08:27

## 2022-01-01 RX ADMIN — GABAPENTIN 300 MG: 600 TABLET ORAL at 09:39

## 2022-01-01 RX ADMIN — MYCOPHENOLATE MOFETIL 300 MG: 500 TABLET ORAL at 23:57

## 2022-01-01 RX ADMIN — ALBUTEROL SULFATE 5 MG: 5 SOLUTION RESPIRATORY (INHALATION) at 06:24

## 2022-01-01 RX ADMIN — HYDRALAZINE HYDROCHLORIDE 25 MG: 25 TABLET ORAL at 15:00

## 2022-01-01 RX ADMIN — SODIUM CHLORIDE, PRESERVATIVE FREE 10 ML: 5 INJECTION INTRAVENOUS at 20:59

## 2022-01-01 RX ADMIN — INSULIN LISPRO 1 UNITS: 100 INJECTION, SOLUTION INTRAVENOUS; SUBCUTANEOUS at 21:37

## 2022-01-01 RX ADMIN — FOLIC ACID 1 MG: 1 TABLET ORAL at 08:24

## 2022-01-01 RX ADMIN — FLUTICASONE PROPIONATE 1 SPRAY: 50 SPRAY, METERED NASAL at 08:50

## 2022-01-01 RX ADMIN — ISOSORBIDE DINITRATE 20 MG: 10 TABLET ORAL at 14:56

## 2022-01-01 RX ADMIN — FLUTICASONE PROPIONATE 1 SPRAY: 50 SPRAY, METERED NASAL at 08:56

## 2022-01-01 RX ADMIN — PANTOPRAZOLE SODIUM 40 MG: 40 TABLET, DELAYED RELEASE ORAL at 08:00

## 2022-01-01 RX ADMIN — POTASSIUM CHLORIDE 20 MEQ: 20 TABLET, EXTENDED RELEASE ORAL at 17:20

## 2022-01-01 RX ADMIN — OXYCODONE HYDROCHLORIDE AND ACETAMINOPHEN 1 TABLET: 5; 325 TABLET ORAL at 22:30

## 2022-01-01 RX ADMIN — ISOSORBIDE DINITRATE 20 MG: 10 TABLET ORAL at 14:54

## 2022-01-01 RX ADMIN — HYDRALAZINE HYDROCHLORIDE 25 MG: 25 TABLET ORAL at 16:12

## 2022-01-01 RX ADMIN — ISOSORBIDE DINITRATE 20 MG: 10 TABLET ORAL at 18:07

## 2022-01-01 RX ADMIN — FERROUS SULFATE TAB EC 325 MG (65 MG FE EQUIVALENT) 325 MG: 325 (65 FE) TABLET DELAYED RESPONSE at 09:28

## 2022-01-01 RX ADMIN — INSULIN LISPRO 4 UNITS: 100 INJECTION, SOLUTION INTRAVENOUS; SUBCUTANEOUS at 07:44

## 2022-01-01 RX ADMIN — HYDRALAZINE HYDROCHLORIDE 25 MG: 25 TABLET ORAL at 22:24

## 2022-01-01 RX ADMIN — ALBUTEROL SULFATE 2.5 MG: 2.5 SOLUTION RESPIRATORY (INHALATION) at 20:35

## 2022-01-01 RX ADMIN — HEPARIN SODIUM 5000 UNITS: 5000 INJECTION INTRAVENOUS; SUBCUTANEOUS at 05:31

## 2022-01-01 RX ADMIN — ACETAMINOPHEN 650 MG: 650 SUPPOSITORY RECTAL at 18:09

## 2022-01-01 RX ADMIN — ISOSORBIDE DINITRATE 20 MG: 10 TABLET ORAL at 17:14

## 2022-01-01 RX ADMIN — OXYCODONE HYDROCHLORIDE AND ACETAMINOPHEN 1 TABLET: 5; 325 TABLET ORAL at 07:44

## 2022-01-01 RX ADMIN — ALBUTEROL SULFATE 2.5 MG: 2.5 SOLUTION RESPIRATORY (INHALATION) at 12:53

## 2022-01-01 RX ADMIN — INSULIN LISPRO 1 UNITS: 100 INJECTION, SOLUTION INTRAVENOUS; SUBCUTANEOUS at 20:37

## 2022-01-01 RX ADMIN — ISOSORBIDE DINITRATE 20 MG: 10 TABLET ORAL at 15:12

## 2022-01-01 RX ADMIN — BUDESONIDE AND FORMOTEROL FUMARATE DIHYDRATE 2 PUFF: 160; 4.5 AEROSOL RESPIRATORY (INHALATION) at 20:31

## 2022-01-01 RX ADMIN — Medication 1 CAPSULE: at 07:59

## 2022-01-01 RX ADMIN — SODIUM CHLORIDE, PRESERVATIVE FREE 10 ML: 5 INJECTION INTRAVENOUS at 20:33

## 2022-01-01 RX ADMIN — FOLIC ACID 1 MG: 1 TABLET ORAL at 08:51

## 2022-01-01 RX ADMIN — POTASSIUM CHLORIDE 10 MEQ: 7.46 INJECTION, SOLUTION INTRAVENOUS at 13:33

## 2022-01-01 RX ADMIN — FUROSEMIDE 40 MG: 10 INJECTION, SOLUTION INTRAMUSCULAR; INTRAVENOUS at 08:36

## 2022-01-01 RX ADMIN — ISOSORBIDE DINITRATE 20 MG: 10 TABLET ORAL at 08:41

## 2022-01-01 RX ADMIN — ALBUTEROL SULFATE 2.5 MG: 2.5 SOLUTION RESPIRATORY (INHALATION) at 08:55

## 2022-01-01 RX ADMIN — OXYCODONE HYDROCHLORIDE AND ACETAMINOPHEN 1 TABLET: 5; 325 TABLET ORAL at 14:27

## 2022-01-01 RX ADMIN — PREDNISONE 20 MG: 20 TABLET ORAL at 15:43

## 2022-01-01 RX ADMIN — LORAZEPAM 1 MG: 2 INJECTION INTRAMUSCULAR at 01:31

## 2022-01-01 RX ADMIN — DESMOPRESSIN ACETATE 40 MG: 0.2 TABLET ORAL at 19:30

## 2022-01-01 RX ADMIN — IPRATROPIUM BROMIDE AND ALBUTEROL SULFATE 1 AMPULE: .5; 3 SOLUTION RESPIRATORY (INHALATION) at 08:53

## 2022-01-01 RX ADMIN — GABAPENTIN 300 MG: 600 TABLET ORAL at 08:30

## 2022-01-01 RX ADMIN — ALBUTEROL SULFATE 2.5 MG: 2.5 SOLUTION RESPIRATORY (INHALATION) at 15:01

## 2022-01-01 RX ADMIN — GABAPENTIN 300 MG: 600 TABLET ORAL at 13:20

## 2022-01-01 RX ADMIN — LEVOFLOXACIN 750 MG: 5 INJECTION, SOLUTION INTRAVENOUS at 12:45

## 2022-01-01 RX ADMIN — DESMOPRESSIN ACETATE 40 MG: 0.2 TABLET ORAL at 20:31

## 2022-01-01 RX ADMIN — FERROUS SULFATE TAB EC 325 MG (65 MG FE EQUIVALENT) 325 MG: 325 (65 FE) TABLET DELAYED RESPONSE at 17:08

## 2022-01-01 RX ADMIN — IPRATROPIUM BROMIDE AND ALBUTEROL SULFATE 1 AMPULE: .5; 3 SOLUTION RESPIRATORY (INHALATION) at 11:27

## 2022-01-01 RX ADMIN — OXYCODONE HYDROCHLORIDE AND ACETAMINOPHEN 1 TABLET: 5; 325 TABLET ORAL at 17:03

## 2022-01-01 RX ADMIN — AMLODIPINE BESYLATE 5 MG: 5 TABLET ORAL at 09:57

## 2022-01-01 RX ADMIN — DIAZEPAM 5 MG: 5 TABLET ORAL at 23:20

## 2022-01-01 RX ADMIN — ALBUTEROL SULFATE 2.5 MG: 2.5 SOLUTION RESPIRATORY (INHALATION) at 16:38

## 2022-01-01 RX ADMIN — OXYCODONE HYDROCHLORIDE AND ACETAMINOPHEN 1 TABLET: 5; 325 TABLET ORAL at 23:42

## 2022-01-01 RX ADMIN — OXYCODONE HYDROCHLORIDE 5 MG: 5 SOLUTION ORAL at 22:44

## 2022-01-01 RX ADMIN — POTASSIUM CHLORIDE 20 MEQ: 20 TABLET, EXTENDED RELEASE ORAL at 08:30

## 2022-01-01 RX ADMIN — ISOSORBIDE DINITRATE 20 MG: 10 TABLET ORAL at 13:39

## 2022-01-01 RX ADMIN — HYDRALAZINE HYDROCHLORIDE 25 MG: 25 TABLET ORAL at 06:04

## 2022-01-01 RX ADMIN — FAMOTIDINE 20 MG: 20 TABLET, FILM COATED ORAL at 08:07

## 2022-01-01 RX ADMIN — SODIUM BICARBONATE: 84 INJECTION, SOLUTION INTRAVENOUS at 02:03

## 2022-01-01 RX ADMIN — OXYCODONE HYDROCHLORIDE AND ACETAMINOPHEN 2 TABLET: 5; 325 TABLET ORAL at 09:35

## 2022-01-01 RX ADMIN — ISOSORBIDE DINITRATE 20 MG: 10 TABLET ORAL at 08:51

## 2022-01-01 RX ADMIN — OXYCODONE HYDROCHLORIDE AND ACETAMINOPHEN 1 TABLET: 5; 325 TABLET ORAL at 09:40

## 2022-01-01 RX ADMIN — INSULIN LISPRO 3 UNITS: 100 INJECTION, SOLUTION INTRAVENOUS; SUBCUTANEOUS at 07:45

## 2022-01-01 RX ADMIN — ISOSORBIDE DINITRATE 20 MG: 10 TABLET ORAL at 20:21

## 2022-01-01 RX ADMIN — MIDODRINE HYDROCHLORIDE 5 MG: 5 TABLET ORAL at 08:18

## 2022-01-01 RX ADMIN — CEFTRIAXONE SODIUM 1000 MG: 1 INJECTION, POWDER, FOR SOLUTION INTRAMUSCULAR; INTRAVENOUS at 11:31

## 2022-01-01 RX ADMIN — BUMETANIDE 1 MG: 1 TABLET ORAL at 08:00

## 2022-01-01 RX ADMIN — HYDRALAZINE HYDROCHLORIDE 25 MG: 25 TABLET ORAL at 21:44

## 2022-01-01 RX ADMIN — BUDESONIDE AND FORMOTEROL FUMARATE DIHYDRATE 2 PUFF: 160; 4.5 AEROSOL RESPIRATORY (INHALATION) at 21:42

## 2022-01-01 RX ADMIN — FOLIC ACID 1 MG: 1 TABLET ORAL at 08:36

## 2022-01-01 RX ADMIN — HEPARIN SODIUM 5000 UNITS: 5000 INJECTION INTRAVENOUS; SUBCUTANEOUS at 13:18

## 2022-01-01 RX ADMIN — ISOSORBIDE DINITRATE 20 MG: 10 TABLET ORAL at 08:13

## 2022-01-01 RX ADMIN — OXYCODONE HYDROCHLORIDE AND ACETAMINOPHEN 1 TABLET: 5; 325 TABLET ORAL at 01:00

## 2022-01-01 RX ADMIN — OXYCODONE HYDROCHLORIDE AND ACETAMINOPHEN 1 TABLET: 5; 325 TABLET ORAL at 09:21

## 2022-01-01 RX ADMIN — POTASSIUM CHLORIDE 20 MEQ: 20 TABLET, EXTENDED RELEASE ORAL at 08:36

## 2022-01-01 RX ADMIN — PHENYLEPHRINE HYDROCHLORIDE 30 MCG/MIN: 10 INJECTION INTRAVENOUS at 23:47

## 2022-01-01 RX ADMIN — IPRATROPIUM BROMIDE AND ALBUTEROL SULFATE 1 AMPULE: .5; 3 SOLUTION RESPIRATORY (INHALATION) at 12:17

## 2022-01-01 RX ADMIN — DESMOPRESSIN ACETATE 40 MG: 0.2 TABLET ORAL at 20:13

## 2022-01-01 RX ADMIN — IPRATROPIUM BROMIDE AND ALBUTEROL SULFATE 1 AMPULE: .5; 3 SOLUTION RESPIRATORY (INHALATION) at 16:52

## 2022-01-01 RX ADMIN — OXYCODONE HYDROCHLORIDE AND ACETAMINOPHEN 2 TABLET: 5; 325 TABLET ORAL at 16:34

## 2022-01-01 RX ADMIN — GABAPENTIN 300 MG: 600 TABLET ORAL at 08:45

## 2022-01-01 RX ADMIN — IPRATROPIUM BROMIDE AND ALBUTEROL SULFATE 1 AMPULE: .5; 3 SOLUTION RESPIRATORY (INHALATION) at 06:23

## 2022-01-01 RX ADMIN — AMLODIPINE BESYLATE 5 MG: 5 TABLET ORAL at 08:13

## 2022-01-01 RX ADMIN — INSULIN GLARGINE 25 UNITS: 100 INJECTION, SOLUTION SUBCUTANEOUS at 11:02

## 2022-01-01 RX ADMIN — TIOTROPIUM BROMIDE INHALATION SPRAY 2 PUFF: 3.12 SPRAY, METERED RESPIRATORY (INHALATION) at 08:46

## 2022-01-01 RX ADMIN — OXYCODONE HYDROCHLORIDE AND ACETAMINOPHEN 1 TABLET: 5; 325 TABLET ORAL at 17:14

## 2022-01-01 RX ADMIN — ISOSORBIDE DINITRATE 20 MG: 10 TABLET ORAL at 17:06

## 2022-01-01 RX ADMIN — ISOSORBIDE DINITRATE 20 MG: 10 TABLET ORAL at 20:37

## 2022-01-01 RX ADMIN — SPIRONOLACTONE 25 MG: 25 TABLET ORAL at 08:36

## 2022-01-01 RX ADMIN — HEPARIN SODIUM 5000 UNITS: 5000 INJECTION INTRAVENOUS; SUBCUTANEOUS at 22:20

## 2022-01-01 RX ADMIN — ISOSORBIDE DINITRATE 20 MG: 10 TABLET ORAL at 18:55

## 2022-01-01 RX ADMIN — Medication 50 MEQ: at 01:56

## 2022-01-01 RX ADMIN — FERROUS SULFATE TAB EC 325 MG (65 MG FE EQUIVALENT) 325 MG: 325 (65 FE) TABLET DELAYED RESPONSE at 08:16

## 2022-01-01 RX ADMIN — OXYCODONE HYDROCHLORIDE AND ACETAMINOPHEN 2 TABLET: 5; 325 TABLET ORAL at 07:54

## 2022-01-01 RX ADMIN — HYDRALAZINE HYDROCHLORIDE 25 MG: 25 TABLET ORAL at 06:41

## 2022-01-01 RX ADMIN — DIAZEPAM 5 MG: 5 TABLET ORAL at 01:28

## 2022-01-01 RX ADMIN — GABAPENTIN 300 MG: 600 TABLET ORAL at 21:01

## 2022-01-01 RX ADMIN — BUMETANIDE 1 MG: 0.25 INJECTION, SOLUTION INTRAMUSCULAR; INTRAVENOUS at 19:23

## 2022-01-01 RX ADMIN — ALBUTEROL SULFATE 2.5 MG: 2.5 SOLUTION RESPIRATORY (INHALATION) at 21:07

## 2022-01-01 RX ADMIN — IPRATROPIUM BROMIDE AND ALBUTEROL SULFATE 1 AMPULE: .5; 3 SOLUTION RESPIRATORY (INHALATION) at 11:40

## 2022-01-01 RX ADMIN — ETOMIDATE 20 MG: 2 INJECTION INTRAVENOUS at 07:37

## 2022-01-01 RX ADMIN — HEPARIN SODIUM 5000 UNITS: 5000 INJECTION INTRAVENOUS; SUBCUTANEOUS at 17:09

## 2022-01-01 RX ADMIN — FUROSEMIDE 80 MG: 10 INJECTION, SOLUTION INTRAMUSCULAR; INTRAVENOUS at 07:57

## 2022-01-01 RX ADMIN — IPRATROPIUM BROMIDE AND ALBUTEROL SULFATE 1 AMPULE: .5; 3 SOLUTION RESPIRATORY (INHALATION) at 20:29

## 2022-01-01 RX ADMIN — ONDANSETRON 4 MG: 2 INJECTION INTRAMUSCULAR; INTRAVENOUS at 11:06

## 2022-01-01 RX ADMIN — CISATRACURIUM BESYLATE 1.5 MCG/KG/MIN: 10 INJECTION, SOLUTION INTRAVENOUS at 23:42

## 2022-01-01 RX ADMIN — BUDESONIDE AND FORMOTEROL FUMARATE DIHYDRATE 2 PUFF: 160; 4.5 AEROSOL RESPIRATORY (INHALATION) at 08:25

## 2022-01-01 RX ADMIN — AMLODIPINE BESYLATE 5 MG: 5 TABLET ORAL at 09:26

## 2022-01-01 RX ADMIN — MYCOPHENOLATE MOFETIL 300 MG: 500 TABLET ORAL at 22:32

## 2022-01-01 RX ADMIN — OXYCODONE HYDROCHLORIDE AND ACETAMINOPHEN 2 TABLET: 5; 325 TABLET ORAL at 12:21

## 2022-01-01 RX ADMIN — ISOSORBIDE DINITRATE 20 MG: 10 TABLET ORAL at 13:14

## 2022-01-01 RX ADMIN — GABAPENTIN 300 MG: 600 TABLET ORAL at 13:18

## 2022-01-01 RX ADMIN — GABAPENTIN 300 MG: 600 TABLET ORAL at 20:37

## 2022-01-01 RX ADMIN — INSULIN GLARGINE 30 UNITS: 100 INJECTION, SOLUTION SUBCUTANEOUS at 08:41

## 2022-01-01 RX ADMIN — BUDESONIDE AND FORMOTEROL FUMARATE DIHYDRATE 2 PUFF: 160; 4.5 AEROSOL RESPIRATORY (INHALATION) at 08:46

## 2022-01-01 RX ADMIN — Medication 1000 MCG: at 09:56

## 2022-01-01 RX ADMIN — FLUTICASONE PROPIONATE 1 SPRAY: 50 SPRAY, METERED NASAL at 08:55

## 2022-01-01 RX ADMIN — Medication 100 MCG/HR: at 07:45

## 2022-01-01 RX ADMIN — OXYCODONE HYDROCHLORIDE AND ACETAMINOPHEN 2 TABLET: 5; 325 TABLET ORAL at 09:38

## 2022-01-01 RX ADMIN — BUMETANIDE 2 MG: 0.25 INJECTION INTRAMUSCULAR; INTRAVENOUS at 09:42

## 2022-01-01 RX ADMIN — POTASSIUM CHLORIDE 20 MEQ: 20 TABLET, EXTENDED RELEASE ORAL at 09:27

## 2022-01-01 RX ADMIN — OXYCODONE HYDROCHLORIDE AND ACETAMINOPHEN 1 TABLET: 5; 325 TABLET ORAL at 17:01

## 2022-01-01 RX ADMIN — INSULIN LISPRO 3 UNITS: 100 INJECTION, SOLUTION INTRAVENOUS; SUBCUTANEOUS at 20:31

## 2022-01-01 RX ADMIN — ENOXAPARIN SODIUM 30 MG: 100 INJECTION SUBCUTANEOUS at 08:59

## 2022-01-01 RX ADMIN — ALBUTEROL SULFATE 2.5 MG: 2.5 SOLUTION RESPIRATORY (INHALATION) at 00:32

## 2022-01-01 RX ADMIN — MYCOPHENOLATE MOFETIL 300 MG: 500 TABLET ORAL at 08:05

## 2022-01-01 RX ADMIN — MIDODRINE HYDROCHLORIDE 5 MG: 5 TABLET ORAL at 11:57

## 2022-01-01 RX ADMIN — HYDRALAZINE HYDROCHLORIDE 25 MG: 25 TABLET ORAL at 15:12

## 2022-01-01 RX ADMIN — BUDESONIDE 500 MCG: 0.5 SUSPENSION RESPIRATORY (INHALATION) at 13:00

## 2022-01-01 RX ADMIN — Medication 1 CAPSULE: at 09:31

## 2022-01-01 RX ADMIN — PANTOPRAZOLE SODIUM 40 MG: 40 TABLET, DELAYED RELEASE ORAL at 09:38

## 2022-01-01 RX ADMIN — INSULIN HUMAN 10 UNITS: 100 INJECTION, SOLUTION PARENTERAL at 00:14

## 2022-01-01 RX ADMIN — ONDANSETRON 4 MG: 2 INJECTION INTRAMUSCULAR; INTRAVENOUS at 14:48

## 2022-01-01 RX ADMIN — BUMETANIDE 1 MG: 1 TABLET ORAL at 09:22

## 2022-01-01 RX ADMIN — ISOSORBIDE DINITRATE 20 MG: 10 TABLET ORAL at 20:04

## 2022-01-01 RX ADMIN — DESMOPRESSIN ACETATE 40 MG: 0.2 TABLET ORAL at 22:18

## 2022-01-01 RX ADMIN — Medication 1000 MCG: at 07:56

## 2022-01-01 RX ADMIN — OXYCODONE HYDROCHLORIDE AND ACETAMINOPHEN 2 TABLET: 5; 325 TABLET ORAL at 00:11

## 2022-01-01 RX ADMIN — BUDESONIDE AND FORMOTEROL FUMARATE DIHYDRATE 2 PUFF: 160; 4.5 AEROSOL RESPIRATORY (INHALATION) at 21:58

## 2022-01-01 RX ADMIN — INSULIN LISPRO 3 UNITS: 100 INJECTION, SOLUTION INTRAVENOUS; SUBCUTANEOUS at 17:09

## 2022-01-01 RX ADMIN — INSULIN LISPRO 3 UNITS: 100 INJECTION, SOLUTION INTRAVENOUS; SUBCUTANEOUS at 12:17

## 2022-01-01 RX ADMIN — INSULIN LISPRO 2 UNITS: 100 INJECTION, SOLUTION INTRAVENOUS; SUBCUTANEOUS at 17:19

## 2022-01-01 RX ADMIN — SODIUM CHLORIDE, POTASSIUM CHLORIDE, SODIUM LACTATE AND CALCIUM CHLORIDE: 600; 310; 30; 20 INJECTION, SOLUTION INTRAVENOUS at 20:07

## 2022-01-01 RX ADMIN — POTASSIUM CHLORIDE 40 MEQ: 1500 TABLET, EXTENDED RELEASE ORAL at 13:28

## 2022-01-01 RX ADMIN — SODIUM CHLORIDE, PRESERVATIVE FREE 10 ML: 5 INJECTION INTRAVENOUS at 08:31

## 2022-01-01 RX ADMIN — ALBUTEROL SULFATE 2.5 MG: 2.5 SOLUTION RESPIRATORY (INHALATION) at 21:22

## 2022-01-01 RX ADMIN — FUROSEMIDE 60 MG: 10 INJECTION, SOLUTION INTRAMUSCULAR; INTRAVENOUS at 08:45

## 2022-01-01 RX ADMIN — LORAZEPAM 1 MG: 2 INJECTION INTRAMUSCULAR at 18:33

## 2022-01-01 RX ADMIN — DIAZEPAM 5 MG: 5 TABLET ORAL at 23:42

## 2022-01-01 RX ADMIN — LORAZEPAM 1 MG: 2 INJECTION INTRAMUSCULAR at 05:38

## 2022-01-01 RX ADMIN — POTASSIUM CHLORIDE 40 MEQ: 20 TABLET, EXTENDED RELEASE ORAL at 08:58

## 2022-01-01 RX ADMIN — ALBUTEROL SULFATE 2.5 MG: 2.5 SOLUTION RESPIRATORY (INHALATION) at 12:03

## 2022-01-01 RX ADMIN — IPRATROPIUM BROMIDE AND ALBUTEROL SULFATE 1 AMPULE: .5; 3 SOLUTION RESPIRATORY (INHALATION) at 08:30

## 2022-01-01 RX ADMIN — HEPARIN SODIUM 5000 UNITS: 5000 INJECTION INTRAVENOUS; SUBCUTANEOUS at 21:07

## 2022-01-01 RX ADMIN — INSULIN LISPRO 4 UNITS: 100 INJECTION, SOLUTION INTRAVENOUS; SUBCUTANEOUS at 16:50

## 2022-01-01 RX ADMIN — AMLODIPINE BESYLATE 5 MG: 5 TABLET ORAL at 08:51

## 2022-01-01 RX ADMIN — FOLIC ACID 1 MG: 1 TABLET ORAL at 09:30

## 2022-01-01 RX ADMIN — SODIUM CHLORIDE, PRESERVATIVE FREE 10 ML: 5 INJECTION INTRAVENOUS at 09:39

## 2022-01-01 RX ADMIN — BUMETANIDE 1 MG: 0.25 INJECTION, SOLUTION INTRAMUSCULAR; INTRAVENOUS at 09:00

## 2022-01-01 RX ADMIN — ACETAMINOPHEN 650 MG: 325 TABLET ORAL at 22:27

## 2022-01-01 RX ADMIN — CETIRIZINE HYDROCHLORIDE 10 MG: 10 TABLET ORAL at 08:18

## 2022-01-01 RX ADMIN — SPIRONOLACTONE 25 MG: 25 TABLET ORAL at 08:54

## 2022-01-01 RX ADMIN — SODIUM CHLORIDE, POTASSIUM CHLORIDE, SODIUM LACTATE AND CALCIUM CHLORIDE 500 ML: 600; 310; 30; 20 INJECTION, SOLUTION INTRAVENOUS at 09:28

## 2022-01-01 RX ADMIN — GUAIFENESIN 600 MG: 600 TABLET, EXTENDED RELEASE ORAL at 08:00

## 2022-01-01 RX ADMIN — FUROSEMIDE 40 MG: 10 INJECTION, SOLUTION INTRAMUSCULAR; INTRAVENOUS at 09:09

## 2022-01-01 RX ADMIN — OXYCODONE HYDROCHLORIDE 5 MG: 5 SOLUTION ORAL at 11:08

## 2022-01-01 RX ADMIN — BUDESONIDE AND FORMOTEROL FUMARATE DIHYDRATE 2 PUFF: 160; 4.5 AEROSOL RESPIRATORY (INHALATION) at 19:40

## 2022-01-01 RX ADMIN — INSULIN LISPRO 1 UNITS: 100 INJECTION, SOLUTION INTRAVENOUS; SUBCUTANEOUS at 12:46

## 2022-01-01 RX ADMIN — AMLODIPINE BESYLATE 5 MG: 5 TABLET ORAL at 08:03

## 2022-01-01 RX ADMIN — DESMOPRESSIN ACETATE 40 MG: 0.2 TABLET ORAL at 19:46

## 2022-01-01 RX ADMIN — MIDODRINE HYDROCHLORIDE 5 MG: 5 TABLET ORAL at 08:35

## 2022-01-01 RX ADMIN — FAMOTIDINE 20 MG: 20 TABLET, FILM COATED ORAL at 22:03

## 2022-01-01 RX ADMIN — PREDNISONE 40 MG: 20 TABLET ORAL at 09:38

## 2022-01-01 RX ADMIN — GABAPENTIN 300 MG: 600 TABLET ORAL at 08:57

## 2022-01-01 RX ADMIN — IPRATROPIUM BROMIDE AND ALBUTEROL SULFATE 1 AMPULE: .5; 3 SOLUTION RESPIRATORY (INHALATION) at 16:06

## 2022-01-01 RX ADMIN — LORAZEPAM 1 MG: 2 INJECTION INTRAMUSCULAR at 03:08

## 2022-01-01 RX ADMIN — ACETAMINOPHEN 650 MG: 650 SUPPOSITORY RECTAL at 01:09

## 2022-01-01 RX ADMIN — LORAZEPAM 1 MG: 2 INJECTION INTRAMUSCULAR at 15:30

## 2022-01-01 RX ADMIN — BUMETANIDE 2 MG: 1 TABLET ORAL at 21:06

## 2022-01-01 RX ADMIN — FAMOTIDINE 20 MG: 20 TABLET, FILM COATED ORAL at 09:33

## 2022-01-01 RX ADMIN — LEVOFLOXACIN 750 MG: 5 INJECTION, SOLUTION INTRAVENOUS at 14:40

## 2022-01-01 RX ADMIN — INSULIN LISPRO 5 UNITS: 100 INJECTION, SOLUTION INTRAVENOUS; SUBCUTANEOUS at 21:47

## 2022-01-01 RX ADMIN — GUAIFENESIN 600 MG: 600 TABLET, EXTENDED RELEASE ORAL at 08:31

## 2022-01-01 RX ADMIN — MEROPENEM 1000 MG: 1 INJECTION, POWDER, FOR SOLUTION INTRAVENOUS at 11:14

## 2022-01-01 RX ADMIN — HYDRALAZINE HYDROCHLORIDE 25 MG: 25 TABLET ORAL at 05:56

## 2022-01-01 RX ADMIN — CISATRACURIUM BESYLATE 2 MCG/KG/MIN: 10 INJECTION, SOLUTION INTRAVENOUS at 22:27

## 2022-01-01 RX ADMIN — BUDESONIDE AND FORMOTEROL FUMARATE DIHYDRATE 2 PUFF: 160; 4.5 AEROSOL RESPIRATORY (INHALATION) at 21:26

## 2022-01-01 RX ADMIN — AMLODIPINE BESYLATE 5 MG: 5 TABLET ORAL at 08:47

## 2022-01-01 RX ADMIN — GUAIFENESIN 600 MG: 600 TABLET, EXTENDED RELEASE ORAL at 20:44

## 2022-01-01 RX ADMIN — MYCOPHENOLATE MOFETIL 300 MG: 500 TABLET ORAL at 20:26

## 2022-01-01 RX ADMIN — OXYCODONE HYDROCHLORIDE AND ACETAMINOPHEN 1 TABLET: 5; 325 TABLET ORAL at 09:43

## 2022-01-01 RX ADMIN — GUAIFENESIN 600 MG: 600 TABLET, EXTENDED RELEASE ORAL at 08:25

## 2022-01-01 RX ADMIN — AMLODIPINE BESYLATE 5 MG: 5 TABLET ORAL at 09:16

## 2022-01-01 RX ADMIN — OXYCODONE HYDROCHLORIDE AND ACETAMINOPHEN 1 TABLET: 5; 325 TABLET ORAL at 21:01

## 2022-01-01 RX ADMIN — GABAPENTIN 300 MG: 600 TABLET ORAL at 13:35

## 2022-01-01 RX ADMIN — ISOSORBIDE DINITRATE 20 MG: 10 TABLET ORAL at 08:24

## 2022-01-01 RX ADMIN — INSULIN LISPRO 9 UNITS: 100 INJECTION, SOLUTION INTRAVENOUS; SUBCUTANEOUS at 18:27

## 2022-01-01 RX ADMIN — HEPARIN SODIUM 5000 UNITS: 5000 INJECTION INTRAVENOUS; SUBCUTANEOUS at 23:29

## 2022-01-01 RX ADMIN — OXYCODONE HYDROCHLORIDE AND ACETAMINOPHEN 1 TABLET: 5; 325 TABLET ORAL at 22:29

## 2022-01-01 RX ADMIN — BUMETANIDE 2 MG: 0.25 INJECTION, SOLUTION INTRAMUSCULAR; INTRAVENOUS at 08:46

## 2022-01-01 RX ADMIN — FERROUS SULFATE TAB EC 325 MG (65 MG FE EQUIVALENT) 325 MG: 325 (65 FE) TABLET DELAYED RESPONSE at 08:10

## 2022-01-01 RX ADMIN — HYDRALAZINE HYDROCHLORIDE 25 MG: 25 TABLET ORAL at 14:12

## 2022-01-01 RX ADMIN — ACETAMINOPHEN 650 MG: 325 TABLET ORAL at 12:21

## 2022-01-01 RX ADMIN — FERROUS SULFATE TAB EC 325 MG (65 MG FE EQUIVALENT) 325 MG: 325 (65 FE) TABLET DELAYED RESPONSE at 17:30

## 2022-01-01 RX ADMIN — ISOSORBIDE DINITRATE 20 MG: 10 TABLET ORAL at 09:13

## 2022-01-01 RX ADMIN — MEROPENEM 1000 MG: 1 INJECTION, POWDER, FOR SOLUTION INTRAVENOUS at 12:47

## 2022-01-01 RX ADMIN — OXYCODONE HYDROCHLORIDE AND ACETAMINOPHEN 2 TABLET: 5; 325 TABLET ORAL at 22:13

## 2022-01-01 RX ADMIN — BUDESONIDE AND FORMOTEROL FUMARATE DIHYDRATE 2 PUFF: 160; 4.5 AEROSOL RESPIRATORY (INHALATION) at 09:27

## 2022-01-01 RX ADMIN — ISOSORBIDE DINITRATE 20 MG: 10 TABLET ORAL at 09:27

## 2022-01-01 RX ADMIN — AMLODIPINE BESYLATE 5 MG: 5 TABLET ORAL at 12:04

## 2022-01-01 RX ADMIN — HYDRALAZINE HYDROCHLORIDE 25 MG: 25 TABLET ORAL at 22:32

## 2022-01-01 RX ADMIN — HEPARIN SODIUM 5000 UNITS: 5000 INJECTION INTRAVENOUS; SUBCUTANEOUS at 20:31

## 2022-01-01 RX ADMIN — FUROSEMIDE 40 MG: 10 INJECTION, SOLUTION INTRAMUSCULAR; INTRAVENOUS at 08:09

## 2022-01-01 RX ADMIN — OXYCODONE HYDROCHLORIDE AND ACETAMINOPHEN 1 TABLET: 5; 325 TABLET ORAL at 20:08

## 2022-01-01 RX ADMIN — OXYCODONE HYDROCHLORIDE AND ACETAMINOPHEN 1 TABLET: 5; 325 TABLET ORAL at 12:30

## 2022-01-01 RX ADMIN — GUAIFENESIN 600 MG: 600 TABLET, EXTENDED RELEASE ORAL at 21:15

## 2022-01-01 RX ADMIN — GUAIFENESIN 600 MG: 600 TABLET, EXTENDED RELEASE ORAL at 08:55

## 2022-01-01 RX ADMIN — Medication 1000 MCG: at 08:36

## 2022-01-01 RX ADMIN — POTASSIUM CHLORIDE 40 MEQ: 20 TABLET, EXTENDED RELEASE ORAL at 12:47

## 2022-01-01 RX ADMIN — OXYCODONE HYDROCHLORIDE AND ACETAMINOPHEN 1 TABLET: 5; 325 TABLET ORAL at 09:34

## 2022-01-01 RX ADMIN — BUDESONIDE AND FORMOTEROL FUMARATE DIHYDRATE 2 PUFF: 160; 4.5 AEROSOL RESPIRATORY (INHALATION) at 00:33

## 2022-01-01 RX ADMIN — LORAZEPAM 1 MG: 2 INJECTION INTRAMUSCULAR at 21:45

## 2022-01-01 RX ADMIN — INSULIN LISPRO 1 UNITS: 100 INJECTION, SOLUTION INTRAVENOUS; SUBCUTANEOUS at 21:00

## 2022-01-01 RX ADMIN — AMLODIPINE BESYLATE 5 MG: 5 TABLET ORAL at 07:56

## 2022-01-01 RX ADMIN — GABAPENTIN 300 MG: 600 TABLET ORAL at 08:47

## 2022-01-01 RX ADMIN — SODIUM CHLORIDE, PRESERVATIVE FREE 10 ML: 5 INJECTION INTRAVENOUS at 08:43

## 2022-01-01 RX ADMIN — GUAIFENESIN 600 MG: 600 TABLET, EXTENDED RELEASE ORAL at 20:05

## 2022-01-01 RX ADMIN — LORAZEPAM 1 MG: 2 INJECTION INTRAMUSCULAR at 07:54

## 2022-01-01 RX ADMIN — SPIRONOLACTONE 25 MG: 25 TABLET ORAL at 16:50

## 2022-01-01 RX ADMIN — AMLODIPINE BESYLATE 5 MG: 5 TABLET ORAL at 08:46

## 2022-01-01 RX ADMIN — HYDRALAZINE HYDROCHLORIDE 25 MG: 25 TABLET ORAL at 06:57

## 2022-01-01 RX ADMIN — FOLIC ACID 1 MG: 1 TABLET ORAL at 08:16

## 2022-01-01 RX ADMIN — INSULIN LISPRO 8 UNITS: 100 INJECTION, SOLUTION INTRAVENOUS; SUBCUTANEOUS at 09:19

## 2022-01-01 RX ADMIN — LEVOFLOXACIN 750 MG: 5 INJECTION, SOLUTION INTRAVENOUS at 01:45

## 2022-01-01 SDOH — SOCIAL STABILITY: SOCIAL NETWORK
DO YOU BELONG TO ANY CLUBS OR ORGANIZATIONS SUCH AS CHURCH GROUPS UNIONS, FRATERNAL OR ATHLETIC GROUPS, OR SCHOOL GROUPS?: NO

## 2022-01-01 SDOH — ECONOMIC STABILITY: INCOME INSECURITY: HOW HARD IS IT FOR YOU TO PAY FOR THE VERY BASICS LIKE FOOD, HOUSING, MEDICAL CARE, AND HEATING?: NOT HARD AT ALL

## 2022-01-01 SDOH — ECONOMIC STABILITY: INCOME INSECURITY: IN THE LAST 12 MONTHS, WAS THERE A TIME WHEN YOU WERE NOT ABLE TO PAY THE MORTGAGE OR RENT ON TIME?: NO

## 2022-01-01 SDOH — SOCIAL STABILITY: SOCIAL INSECURITY
WITHIN THE LAST YEAR, HAVE TO BEEN RAPED OR FORCED TO HAVE ANY KIND OF SEXUAL ACTIVITY BY YOUR PARTNER OR EX-PARTNER?: NO

## 2022-01-01 SDOH — SOCIAL STABILITY: SOCIAL INSECURITY: WITHIN THE LAST YEAR, HAVE YOU BEEN HUMILIATED OR EMOTIONALLY ABUSED IN OTHER WAYS BY YOUR PARTNER OR EX-PARTNER?: NO

## 2022-01-01 SDOH — SOCIAL STABILITY: SOCIAL NETWORK: HOW OFTEN DO YOU GET TOGETHER WITH FRIENDS OR RELATIVES?: THREE TIMES A WEEK

## 2022-01-01 SDOH — SOCIAL STABILITY: SOCIAL NETWORK: ARE YOU MARRIED, WIDOWED, DIVORCED, SEPARATED, NEVER MARRIED, OR LIVING WITH A PARTNER?: NEVER MARRIED

## 2022-01-01 SDOH — ECONOMIC STABILITY: TRANSPORTATION INSECURITY
IN THE PAST 12 MONTHS, HAS LACK OF TRANSPORTATION KEPT YOU FROM MEETINGS, WORK, OR FROM GETTING THINGS NEEDED FOR DAILY LIVING?: NO

## 2022-01-01 SDOH — ECONOMIC STABILITY: FOOD INSECURITY: WITHIN THE PAST 12 MONTHS, YOU WORRIED THAT YOUR FOOD WOULD RUN OUT BEFORE YOU GOT MONEY TO BUY MORE.: NEVER TRUE

## 2022-01-01 SDOH — HEALTH STABILITY: PHYSICAL HEALTH: ON AVERAGE, HOW MANY DAYS PER WEEK DO YOU ENGAGE IN MODERATE TO STRENUOUS EXERCISE (LIKE A BRISK WALK)?: 0 DAYS

## 2022-01-01 SDOH — ECONOMIC STABILITY: HOUSING INSECURITY
IN THE LAST 12 MONTHS, WAS THERE A TIME WHEN YOU DID NOT HAVE A STEADY PLACE TO SLEEP OR SLEPT IN A SHELTER (INCLUDING NOW)?: NO

## 2022-01-01 SDOH — SOCIAL STABILITY: SOCIAL NETWORK: HOW OFTEN DO YOU ATTEND CHURCH OR RELIGIOUS SERVICES?: 1 TO 4 TIMES PER YEAR

## 2022-01-01 SDOH — HEALTH STABILITY: MENTAL HEALTH: HOW OFTEN DO YOU HAVE A DRINK CONTAINING ALCOHOL?: NEVER

## 2022-01-01 SDOH — HEALTH STABILITY: PHYSICAL HEALTH: ON AVERAGE, HOW MANY MINUTES DO YOU ENGAGE IN EXERCISE AT THIS LEVEL?: 0 MIN

## 2022-01-01 SDOH — HEALTH STABILITY: MENTAL HEALTH
STRESS IS WHEN SOMEONE FEELS TENSE, NERVOUS, ANXIOUS, OR CAN'T SLEEP AT NIGHT BECAUSE THEIR MIND IS TROUBLED. HOW STRESSED ARE YOU?: TO SOME EXTENT

## 2022-01-01 SDOH — ECONOMIC STABILITY: FOOD INSECURITY: WITHIN THE PAST 12 MONTHS, THE FOOD YOU BOUGHT JUST DIDN'T LAST AND YOU DIDN'T HAVE MONEY TO GET MORE.: SOMETIMES TRUE

## 2022-01-01 SDOH — SOCIAL STABILITY: SOCIAL INSECURITY
WITHIN THE LAST YEAR, HAVE YOU BEEN KICKED, HIT, SLAPPED, OR OTHERWISE PHYSICALLY HURT BY YOUR PARTNER OR EX-PARTNER?: NO

## 2022-01-01 SDOH — SOCIAL STABILITY: SOCIAL NETWORK: HOW OFTEN DO YOU ATTENT MEETINGS OF THE CLUB OR ORGANIZATION YOU BELONG TO?: 1 TO 4 TIMES PER YEAR

## 2022-01-01 SDOH — ECONOMIC STABILITY: TRANSPORTATION INSECURITY
IN THE PAST 12 MONTHS, HAS THE LACK OF TRANSPORTATION KEPT YOU FROM MEDICAL APPOINTMENTS OR FROM GETTING MEDICATIONS?: NO

## 2022-01-01 SDOH — SOCIAL STABILITY: SOCIAL NETWORK: IN A TYPICAL WEEK, HOW MANY TIMES DO YOU TALK ON THE PHONE WITH FAMILY, FRIENDS, OR NEIGHBORS?: THREE TIMES A WEEK

## 2022-01-01 SDOH — SOCIAL STABILITY: SOCIAL INSECURITY: WITHIN THE LAST YEAR, HAVE YOU BEEN AFRAID OF YOUR PARTNER OR EX-PARTNER?: NO

## 2022-01-01 ASSESSMENT — PAIN SCALES - GENERAL
PAINLEVEL_OUTOF10: 0
PAINLEVEL_OUTOF10: 7
PAINLEVEL_OUTOF10: 0
PAINLEVEL_OUTOF10: 0
PAINLEVEL_OUTOF10: 8
PAINLEVEL_OUTOF10: 8
PAINLEVEL_OUTOF10: 5
PAINLEVEL_OUTOF10: 7
PAINLEVEL_OUTOF10: 8
PAINLEVEL_OUTOF10: 5
PAINLEVEL_OUTOF10: 3
PAINLEVEL_OUTOF10: 0
PAINLEVEL_OUTOF10: 7
PAINLEVEL_OUTOF10: 0
PAINLEVEL_OUTOF10: 7
PAINLEVEL_OUTOF10: 8
PAINLEVEL_OUTOF10: 7
PAINLEVEL_OUTOF10: 5
PAINLEVEL_OUTOF10: 5
PAINLEVEL_OUTOF10: 8
PAINLEVEL_OUTOF10: 0
PAINLEVEL_OUTOF10: 9
PAINLEVEL_OUTOF10: 0
PAINLEVEL_OUTOF10: 5
PAINLEVEL_OUTOF10: 9
PAINLEVEL_OUTOF10: 8
PAINLEVEL_OUTOF10: 0
PAINLEVEL_OUTOF10: 0
PAINLEVEL_OUTOF10: 4
PAINLEVEL_OUTOF10: 8
PAINLEVEL_OUTOF10: 0
PAINLEVEL_OUTOF10: 7
PAINLEVEL_OUTOF10: 0
PAINLEVEL_OUTOF10: 8
PAINLEVEL_OUTOF10: 0
PAINLEVEL_OUTOF10: 6
PAINLEVEL_OUTOF10: 8
PAINLEVEL_OUTOF10: 0
PAINLEVEL_OUTOF10: 8
PAINLEVEL_OUTOF10: 8
PAINLEVEL_OUTOF10: 4
PAINLEVEL_OUTOF10: 6
PAINLEVEL_OUTOF10: 7
PAINLEVEL_OUTOF10: 0
PAINLEVEL_OUTOF10: 5
PAINLEVEL_OUTOF10: 9
PAINLEVEL_OUTOF10: 7
PAINLEVEL_OUTOF10: 8
PAINLEVEL_OUTOF10: 5
PAINLEVEL_OUTOF10: 6
PAINLEVEL_OUTOF10: 4
PAINLEVEL_OUTOF10: 8
PAINLEVEL_OUTOF10: 4
PAINLEVEL_OUTOF10: 0
PAINLEVEL_OUTOF10: 6
PAINLEVEL_OUTOF10: 5
PAINLEVEL_OUTOF10: 0
PAINLEVEL_OUTOF10: 7
PAINLEVEL_OUTOF10: 6
PAINLEVEL_OUTOF10: 5
PAINLEVEL_OUTOF10: 7
PAINLEVEL_OUTOF10: 4
PAINLEVEL_OUTOF10: 7
PAINLEVEL_OUTOF10: 5
PAINLEVEL_OUTOF10: 7
PAINLEVEL_OUTOF10: 0
PAINLEVEL_OUTOF10: 0
PAINLEVEL_OUTOF10: 7
PAINLEVEL_OUTOF10: 0
PAINLEVEL_OUTOF10: 7
PAINLEVEL_OUTOF10: 0
PAINLEVEL_OUTOF10: 8
PAINLEVEL_OUTOF10: 7
PAINLEVEL_OUTOF10: 8
PAINLEVEL_OUTOF10: 8
PAINLEVEL_OUTOF10: 7
PAINLEVEL_OUTOF10: 4
PAINLEVEL_OUTOF10: 3
PAINLEVEL_OUTOF10: 8
PAINLEVEL_OUTOF10: 0
PAINLEVEL_OUTOF10: 7
PAINLEVEL_OUTOF10: 4
PAINLEVEL_OUTOF10: 5
PAINLEVEL_OUTOF10: 7
PAINLEVEL_OUTOF10: 3
PAINLEVEL_OUTOF10: 7
PAINLEVEL_OUTOF10: 6
PAINLEVEL_OUTOF10: 6
PAINLEVEL_OUTOF10: 8
PAINLEVEL_OUTOF10: 7
PAINLEVEL_OUTOF10: 0
PAINLEVEL_OUTOF10: 7
PAINLEVEL_OUTOF10: 0
PAINLEVEL_OUTOF10: 0
PAINLEVEL_OUTOF10: 8
PAINLEVEL_OUTOF10: 0
PAINLEVEL_OUTOF10: 7
PAINLEVEL_OUTOF10: 6
PAINLEVEL_OUTOF10: 7
PAINLEVEL_OUTOF10: 7
PAINLEVEL_OUTOF10: 0
PAINLEVEL_OUTOF10: 7
PAINLEVEL_OUTOF10: 0
PAINLEVEL_OUTOF10: 0
PAINLEVEL_OUTOF10: 8
PAINLEVEL_OUTOF10: 7
PAINLEVEL_OUTOF10: 2
PAINLEVEL_OUTOF10: 9
PAINLEVEL_OUTOF10: 4
PAINLEVEL_OUTOF10: 0
PAINLEVEL_OUTOF10: 7
PAINLEVEL_OUTOF10: 6
PAINLEVEL_OUTOF10: 5
PAINLEVEL_OUTOF10: 0
PAINLEVEL_OUTOF10: 8
PAINLEVEL_OUTOF10: 6
PAINLEVEL_OUTOF10: 7
PAINLEVEL_OUTOF10: 0
PAINLEVEL_OUTOF10: 5
PAINLEVEL_OUTOF10: 8
PAINLEVEL_OUTOF10: 7
PAINLEVEL_OUTOF10: 2
PAINLEVEL_OUTOF10: 3
PAINLEVEL_OUTOF10: 0
PAINLEVEL_OUTOF10: 8
PAINLEVEL_OUTOF10: 6
PAINLEVEL_OUTOF10: 1
PAINLEVEL_OUTOF10: 7
PAINLEVEL_OUTOF10: 6
PAINLEVEL_OUTOF10: 0
PAINLEVEL_OUTOF10: 0
PAINLEVEL_OUTOF10: 8
PAINLEVEL_OUTOF10: 8
PAINLEVEL_OUTOF10: 5
PAINLEVEL_OUTOF10: 0
PAINLEVEL_OUTOF10: 5
PAINLEVEL_OUTOF10: 5
PAINLEVEL_OUTOF10: 7
PAINLEVEL_OUTOF10: 0
PAINLEVEL_OUTOF10: 8
PAINLEVEL_OUTOF10: 8
PAINLEVEL_OUTOF10: 0
PAINLEVEL_OUTOF10: 6
PAINLEVEL_OUTOF10: 0
PAINLEVEL_OUTOF10: 6
PAINLEVEL_OUTOF10: 7
PAINLEVEL_OUTOF10: 0
PAINLEVEL_OUTOF10: 6
PAINLEVEL_OUTOF10: 0
PAINLEVEL_OUTOF10: 7
PAINLEVEL_OUTOF10: 6
PAINLEVEL_OUTOF10: 0
PAINLEVEL_OUTOF10: 5
PAINLEVEL_OUTOF10: 6
PAINLEVEL_OUTOF10: 9
PAINLEVEL_OUTOF10: 7
PAINLEVEL_OUTOF10: 8
PAINLEVEL_OUTOF10: 0
PAINLEVEL_OUTOF10: 6
PAINLEVEL_OUTOF10: 5
PAINLEVEL_OUTOF10: 6
PAINLEVEL_OUTOF10: 5
PAINLEVEL_OUTOF10: 6
PAINLEVEL_OUTOF10: 2
PAINLEVEL_OUTOF10: 8
PAINLEVEL_OUTOF10: 8
PAINLEVEL_OUTOF10: 7
PAINLEVEL_OUTOF10: 7
PAINLEVEL_OUTOF10: 5
PAINLEVEL_OUTOF10: 7
PAINLEVEL_OUTOF10: 8
PAINLEVEL_OUTOF10: 0
PAINLEVEL_OUTOF10: 8
PAINLEVEL_OUTOF10: 0
PAINLEVEL_OUTOF10: 8
PAINLEVEL_OUTOF10: 0
PAINLEVEL_OUTOF10: 6
PAINLEVEL_OUTOF10: 0
PAINLEVEL_OUTOF10: 8
PAINLEVEL_OUTOF10: 9
PAINLEVEL_OUTOF10: 8
PAINLEVEL_OUTOF10: 7
PAINLEVEL_OUTOF10: 5
PAINLEVEL_OUTOF10: 6
PAINLEVEL_OUTOF10: 7
PAINLEVEL_OUTOF10: 7
PAINLEVEL_OUTOF10: 9
PAINLEVEL_OUTOF10: 0
PAINLEVEL_OUTOF10: 0
PAINLEVEL_OUTOF10: 8
PAINLEVEL_OUTOF10: 5
PAINLEVEL_OUTOF10: 3
PAINLEVEL_OUTOF10: 4
PAINLEVEL_OUTOF10: 6
PAINLEVEL_OUTOF10: 0
PAINLEVEL_OUTOF10: 8
PAINLEVEL_OUTOF10: 6
PAINLEVEL_OUTOF10: 8
PAINLEVEL_OUTOF10: 6
PAINLEVEL_OUTOF10: 7
PAINLEVEL_OUTOF10: 7
PAINLEVEL_OUTOF10: 6
PAINLEVEL_OUTOF10: 4
PAINLEVEL_OUTOF10: 8
PAINLEVEL_OUTOF10: 0
PAINLEVEL_OUTOF10: 1
PAINLEVEL_OUTOF10: 7
PAINLEVEL_OUTOF10: 8
PAINLEVEL_OUTOF10: 0
PAINLEVEL_OUTOF10: 7
PAINLEVEL_OUTOF10: 7
PAINLEVEL_OUTOF10: 0
PAINLEVEL_OUTOF10: 4
PAINLEVEL_OUTOF10: 4
PAINLEVEL_OUTOF10: 0
PAINLEVEL_OUTOF10: 0
PAINLEVEL_OUTOF10: 8
PAINLEVEL_OUTOF10: 0
PAINLEVEL_OUTOF10: 7
PAINLEVEL_OUTOF10: 0
PAINLEVEL_OUTOF10: 6
PAINLEVEL_OUTOF10: 0
PAINLEVEL_OUTOF10: 8
PAINLEVEL_OUTOF10: 0
PAINLEVEL_OUTOF10: 7
PAINLEVEL_OUTOF10: 8
PAINLEVEL_OUTOF10: 0
PAINLEVEL_OUTOF10: 3
PAINLEVEL_OUTOF10: 5
PAINLEVEL_OUTOF10: 0
PAINLEVEL_OUTOF10: 7
PAINLEVEL_OUTOF10: 8
PAINLEVEL_OUTOF10: 5
PAINLEVEL_OUTOF10: 7
PAINLEVEL_OUTOF10: 0
PAINLEVEL_OUTOF10: 6
PAINLEVEL_OUTOF10: 8
PAINLEVEL_OUTOF10: 0
PAINLEVEL_OUTOF10: 0
PAINLEVEL_OUTOF10: 8
PAINLEVEL_OUTOF10: 0
PAINLEVEL_OUTOF10: 5
PAINLEVEL_OUTOF10: 6
PAINLEVEL_OUTOF10: 5

## 2022-01-01 ASSESSMENT — ENCOUNTER SYMPTOMS
WHEEZING: 0
VOICE CHANGE: 0
VOICE CHANGE: 0
ABDOMINAL PAIN: 0
SHORTNESS OF BREATH: 1
WHEEZING: 0
RHINORRHEA: 0
COUGH: 0
VOICE CHANGE: 0
DIARRHEA: 0
PHOTOPHOBIA: 0
DIARRHEA: 0
VOMITING: 0
WHEEZING: 1
ABDOMINAL PAIN: 0
EYE DISCHARGE: 0
RHINORRHEA: 0
ALLERGIC/IMMUNOLOGIC NEGATIVE: 1
RHINORRHEA: 0
SHORTNESS OF BREATH: 0
DIARRHEA: 0
DIARRHEA: 0
VOMITING: 0
SHORTNESS OF BREATH: 1
VOMITING: 0
WHEEZING: 0
GASTROINTESTINAL NEGATIVE: 1
COUGH: 1
TROUBLE SWALLOWING: 0
EYE REDNESS: 0
VOMITING: 0
VOICE CHANGE: 0
TROUBLE SWALLOWING: 0
ALLERGIC/IMMUNOLOGIC NEGATIVE: 1
ALLERGIC/IMMUNOLOGIC NEGATIVE: 1
TROUBLE SWALLOWING: 0
VOMITING: 0
VOMITING: 0
ABDOMINAL PAIN: 0
GASTROINTESTINAL NEGATIVE: 1
VOMITING: 0
COUGH: 0
SHORTNESS OF BREATH: 1
ALLERGIC/IMMUNOLOGIC NEGATIVE: 1
ABDOMINAL PAIN: 0
PHOTOPHOBIA: 0
COUGH: 1
SHORTNESS OF BREATH: 1
EYE REDNESS: 0
PHOTOPHOBIA: 0
PHOTOPHOBIA: 0
ALLERGIC/IMMUNOLOGIC NEGATIVE: 1
WHEEZING: 0
ABDOMINAL PAIN: 0
VOICE CHANGE: 0
RHINORRHEA: 0
CHEST TIGHTNESS: 0
EYE DISCHARGE: 0
SHORTNESS OF BREATH: 1
SHORTNESS OF BREATH: 0
SORE THROAT: 0
RHINORRHEA: 0
SHORTNESS OF BREATH: 1
EYE REDNESS: 0
SHORTNESS OF BREATH: 1
ABDOMINAL PAIN: 0
VOMITING: 0
WHEEZING: 0
ABDOMINAL PAIN: 0
TROUBLE SWALLOWING: 0
TROUBLE SWALLOWING: 0
CHEST TIGHTNESS: 1
RHINORRHEA: 0
WHEEZING: 0
EYE REDNESS: 0
PHOTOPHOBIA: 0
SHORTNESS OF BREATH: 1
SHORTNESS OF BREATH: 1
VOICE CHANGE: 0
SORE THROAT: 0
SHORTNESS OF BREATH: 1
DIARRHEA: 0
GASTROINTESTINAL NEGATIVE: 1
ABDOMINAL PAIN: 0
WHEEZING: 0
ABDOMINAL PAIN: 0
EYE DISCHARGE: 0
PHOTOPHOBIA: 0
ABDOMINAL PAIN: 0
VOICE CHANGE: 0
EYE DISCHARGE: 0
ABDOMINAL PAIN: 0
SORE THROAT: 0
SORE THROAT: 0
ABDOMINAL PAIN: 0
WHEEZING: 0
ALLERGIC/IMMUNOLOGIC NEGATIVE: 1
VOICE CHANGE: 0
SORE THROAT: 0
COUGH: 1
ABDOMINAL PAIN: 0
TROUBLE SWALLOWING: 0
SORE THROAT: 0
TROUBLE SWALLOWING: 0
DIARRHEA: 0
VOICE CHANGE: 0
SHORTNESS OF BREATH: 1
COUGH: 1
WHEEZING: 0
VOMITING: 0
DIARRHEA: 0
EYE REDNESS: 0
EYE PAIN: 0
DIARRHEA: 0
RHINORRHEA: 0
SORE THROAT: 0
SHORTNESS OF BREATH: 1
VOMITING: 0
WHEEZING: 0
ABDOMINAL PAIN: 0
ABDOMINAL PAIN: 0
RHINORRHEA: 0
DIARRHEA: 0
COUGH: 1
EYE REDNESS: 0
SHORTNESS OF BREATH: 1
TROUBLE SWALLOWING: 0
ABDOMINAL PAIN: 0
VOMITING: 0
NAUSEA: 0
SHORTNESS OF BREATH: 0
RHINORRHEA: 0
SHORTNESS OF BREATH: 1
EYE REDNESS: 0
DIARRHEA: 0
VOMITING: 0
VOMITING: 0
VOICE CHANGE: 0
VOICE CHANGE: 0
DIARRHEA: 0
ABDOMINAL PAIN: 0
WHEEZING: 0
SHORTNESS OF BREATH: 1
EYE DISCHARGE: 0
EYE REDNESS: 0
VOICE CHANGE: 0
SHORTNESS OF BREATH: 1
RHINORRHEA: 0
COUGH: 1
FACIAL SWELLING: 0
GASTROINTESTINAL NEGATIVE: 1
VOICE CHANGE: 0
CHEST TIGHTNESS: 0
CHOKING: 0
EYE REDNESS: 0
ABDOMINAL PAIN: 0
RHINORRHEA: 0
VOICE CHANGE: 0
TROUBLE SWALLOWING: 0
VOICE CHANGE: 0
SORE THROAT: 0
SHORTNESS OF BREATH: 1
ABDOMINAL PAIN: 0
WHEEZING: 0
DIARRHEA: 0
VOMITING: 0
TROUBLE SWALLOWING: 0
PHOTOPHOBIA: 0
TROUBLE SWALLOWING: 0
COUGH: 0
SORE THROAT: 0
SHORTNESS OF BREATH: 1
DIARRHEA: 0
WHEEZING: 0
VOICE CHANGE: 0
ALLERGIC/IMMUNOLOGIC NEGATIVE: 1
WHEEZING: 0
COUGH: 1
PHOTOPHOBIA: 0
VOICE CHANGE: 0
SHORTNESS OF BREATH: 1
SORE THROAT: 0
SHORTNESS OF BREATH: 1
ABDOMINAL PAIN: 0
ALLERGIC/IMMUNOLOGIC NEGATIVE: 1
SORE THROAT: 0
ABDOMINAL PAIN: 0
NAUSEA: 1
VOMITING: 0
VOICE CHANGE: 0
VOMITING: 0
NAUSEA: 0
WHEEZING: 0
ALLERGIC/IMMUNOLOGIC NEGATIVE: 1
RHINORRHEA: 0
TROUBLE SWALLOWING: 0
ABDOMINAL PAIN: 0
PHOTOPHOBIA: 0
PHOTOPHOBIA: 0
DIARRHEA: 0
DIARRHEA: 0
ABDOMINAL PAIN: 0
VOMITING: 0
VOMITING: 0
EYE DISCHARGE: 0
VOMITING: 0
EYES NEGATIVE: 1
SHORTNESS OF BREATH: 1
VOICE CHANGE: 0
DIARRHEA: 0
RHINORRHEA: 0
COLOR CHANGE: 0
SHORTNESS OF BREATH: 1
COUGH: 1
DIARRHEA: 0
TROUBLE SWALLOWING: 0
EYE REDNESS: 0
ALLERGIC/IMMUNOLOGIC NEGATIVE: 1
VOMITING: 0
EYE REDNESS: 0
BLOOD IN STOOL: 0
EYE REDNESS: 0
COUGH: 1
VOICE CHANGE: 0
EYE DISCHARGE: 0
COUGH: 1
PHOTOPHOBIA: 0
TROUBLE SWALLOWING: 0
SORE THROAT: 0
VOMITING: 0
VOMITING: 0
COUGH: 1
SHORTNESS OF BREATH: 0
ALLERGIC/IMMUNOLOGIC NEGATIVE: 1
SHORTNESS OF BREATH: 1
SHORTNESS OF BREATH: 1
ALLERGIC/IMMUNOLOGIC NEGATIVE: 1
DIARRHEA: 0
NAUSEA: 0
DIARRHEA: 0
COUGH: 0
ABDOMINAL PAIN: 0
WHEEZING: 0
SHORTNESS OF BREATH: 1
ABDOMINAL PAIN: 0
EYE REDNESS: 0
DIARRHEA: 0
DIARRHEA: 0
EYE REDNESS: 0
DIARRHEA: 0
VOMITING: 0
DIARRHEA: 0
COUGH: 1
WHEEZING: 0
VOMITING: 0
SORE THROAT: 0
COUGH: 0
EYE DISCHARGE: 0
COUGH: 1
ALLERGIC/IMMUNOLOGIC NEGATIVE: 1
VOICE CHANGE: 0
RHINORRHEA: 0
PHOTOPHOBIA: 0
SHORTNESS OF BREATH: 1
WHEEZING: 0
ABDOMINAL PAIN: 0
PHOTOPHOBIA: 0
VOICE CHANGE: 0
VOMITING: 0

## 2022-01-01 ASSESSMENT — PAIN DESCRIPTION - LOCATION
LOCATION: GENERALIZED
LOCATION: GENERALIZED
LOCATION: NECK
LOCATION: GENERALIZED
LOCATION: ARM
LOCATION: GENERALIZED
LOCATION: ABDOMEN
LOCATION: BACK;LEG
LOCATION: KNEE
LOCATION: WRIST
LOCATION: BUTTOCKS
LOCATION: KNEE
LOCATION: HEAD
LOCATION: WRIST
LOCATION: WRIST;LEG
LOCATION: CHEST
LOCATION: GENERALIZED
LOCATION: KNEE
LOCATION: WRIST
LOCATION: GENERALIZED
LOCATION: BACK
LOCATION: KNEE;SHOULDER
LOCATION: GENERALIZED
LOCATION: BACK
LOCATION: GENERALIZED
LOCATION: ARM
LOCATION: GENERALIZED
LOCATION: KNEE;SHOULDER
LOCATION: WRIST
LOCATION: GENERALIZED
LOCATION: WRIST
LOCATION: GENERALIZED
LOCATION: WRIST;FOOT
LOCATION: GENERALIZED
LOCATION: WRIST
LOCATION: WRIST;LEG
LOCATION: KNEE
LOCATION: WRIST
LOCATION: KNEE
LOCATION: GENERALIZED
LOCATION: BACK
LOCATION: GENERALIZED
LOCATION: GENERALIZED
LOCATION: WRIST
LOCATION: KNEE
LOCATION: KNEE
LOCATION: GENERALIZED
LOCATION: BACK
LOCATION: WRIST;KNEE
LOCATION: CHEST;WRIST;LEG
LOCATION: GENERALIZED
LOCATION: WRIST;KNEE
LOCATION: WRIST;KNEE
LOCATION: GENERALIZED
LOCATION: ARM;WRIST
LOCATION: WRIST

## 2022-01-01 ASSESSMENT — PAIN DESCRIPTION - PAIN TYPE
TYPE: ACUTE PAIN
TYPE: ACUTE PAIN
TYPE: CHRONIC PAIN
TYPE: ACUTE PAIN
TYPE: CHRONIC PAIN
TYPE: ACUTE PAIN;CHRONIC PAIN
TYPE: CHRONIC PAIN
TYPE: ACUTE PAIN
TYPE: CHRONIC PAIN
TYPE: ACUTE PAIN;CHRONIC PAIN
TYPE: ACUTE PAIN;CHRONIC PAIN
TYPE: CHRONIC PAIN
TYPE: ACUTE PAIN;CHRONIC PAIN
TYPE: CHRONIC PAIN
TYPE: CHRONIC PAIN
TYPE: ACUTE PAIN
TYPE: ACUTE PAIN;CHRONIC PAIN
TYPE: CHRONIC PAIN

## 2022-01-01 ASSESSMENT — PAIN DESCRIPTION - PROGRESSION
CLINICAL_PROGRESSION: NOT CHANGED
CLINICAL_PROGRESSION: GRADUALLY IMPROVING
CLINICAL_PROGRESSION: NOT CHANGED
CLINICAL_PROGRESSION: GRADUALLY IMPROVING
CLINICAL_PROGRESSION: NOT CHANGED
CLINICAL_PROGRESSION: GRADUALLY IMPROVING
CLINICAL_PROGRESSION: NOT CHANGED
CLINICAL_PROGRESSION: GRADUALLY IMPROVING
CLINICAL_PROGRESSION: NOT CHANGED
CLINICAL_PROGRESSION: GRADUALLY IMPROVING
CLINICAL_PROGRESSION: NOT CHANGED
CLINICAL_PROGRESSION: GRADUALLY IMPROVING
CLINICAL_PROGRESSION: NOT CHANGED
CLINICAL_PROGRESSION: GRADUALLY IMPROVING
CLINICAL_PROGRESSION: NOT CHANGED
CLINICAL_PROGRESSION: GRADUALLY IMPROVING
CLINICAL_PROGRESSION: GRADUALLY IMPROVING
CLINICAL_PROGRESSION: NOT CHANGED
CLINICAL_PROGRESSION: GRADUALLY IMPROVING
CLINICAL_PROGRESSION: NOT CHANGED
CLINICAL_PROGRESSION: GRADUALLY IMPROVING
CLINICAL_PROGRESSION: NOT CHANGED
CLINICAL_PROGRESSION: GRADUALLY IMPROVING
CLINICAL_PROGRESSION: GRADUALLY IMPROVING
CLINICAL_PROGRESSION: NOT CHANGED
CLINICAL_PROGRESSION: NOT CHANGED
CLINICAL_PROGRESSION: GRADUALLY IMPROVING
CLINICAL_PROGRESSION: NOT CHANGED
CLINICAL_PROGRESSION: GRADUALLY IMPROVING
CLINICAL_PROGRESSION: NOT CHANGED
CLINICAL_PROGRESSION: GRADUALLY IMPROVING
CLINICAL_PROGRESSION: NOT CHANGED
CLINICAL_PROGRESSION: GRADUALLY IMPROVING
CLINICAL_PROGRESSION: NOT CHANGED
CLINICAL_PROGRESSION: NOT CHANGED
CLINICAL_PROGRESSION: GRADUALLY IMPROVING
CLINICAL_PROGRESSION: GRADUALLY IMPROVING
CLINICAL_PROGRESSION: NOT CHANGED
CLINICAL_PROGRESSION: GRADUALLY IMPROVING
CLINICAL_PROGRESSION: NOT CHANGED
CLINICAL_PROGRESSION: GRADUALLY IMPROVING
CLINICAL_PROGRESSION: GRADUALLY IMPROVING
CLINICAL_PROGRESSION: NOT CHANGED
CLINICAL_PROGRESSION: NOT CHANGED
CLINICAL_PROGRESSION: GRADUALLY IMPROVING
CLINICAL_PROGRESSION: NOT CHANGED
CLINICAL_PROGRESSION: GRADUALLY IMPROVING
CLINICAL_PROGRESSION: NOT CHANGED
CLINICAL_PROGRESSION: GRADUALLY IMPROVING
CLINICAL_PROGRESSION: NOT CHANGED
CLINICAL_PROGRESSION: GRADUALLY IMPROVING
CLINICAL_PROGRESSION: NOT CHANGED
CLINICAL_PROGRESSION: NOT CHANGED
CLINICAL_PROGRESSION: GRADUALLY IMPROVING
CLINICAL_PROGRESSION: NOT CHANGED
CLINICAL_PROGRESSION: GRADUALLY IMPROVING
CLINICAL_PROGRESSION: GRADUALLY IMPROVING
CLINICAL_PROGRESSION: NOT CHANGED
CLINICAL_PROGRESSION: GRADUALLY IMPROVING
CLINICAL_PROGRESSION: GRADUALLY IMPROVING
CLINICAL_PROGRESSION: NOT CHANGED
CLINICAL_PROGRESSION: GRADUALLY IMPROVING
CLINICAL_PROGRESSION: NOT CHANGED
CLINICAL_PROGRESSION: GRADUALLY IMPROVING
CLINICAL_PROGRESSION: NOT CHANGED
CLINICAL_PROGRESSION: NOT CHANGED

## 2022-01-01 ASSESSMENT — PAIN SCALES - WONG BAKER
WONGBAKER_NUMERICALRESPONSE: 0
WONGBAKER_NUMERICALRESPONSE: 2
WONGBAKER_NUMERICALRESPONSE: 0
WONGBAKER_NUMERICALRESPONSE: 6
WONGBAKER_NUMERICALRESPONSE: 0

## 2022-01-01 ASSESSMENT — PULMONARY FUNCTION TESTS
PIF_VALUE: 26
PIF_VALUE: 42
PIF_VALUE: 41
PIF_VALUE: 44
PIF_VALUE: 51
PIF_VALUE: 40
PIF_VALUE: 42
PIF_VALUE: 41
PIF_VALUE: 43
PIF_VALUE: 42
PIF_VALUE: 43
PIF_VALUE: 42
PIF_VALUE: 45
PIF_VALUE: 39
PIF_VALUE: 39
PIF_VALUE: 46
PIF_VALUE: 45
PIF_VALUE: 42
PIF_VALUE: 41
PIF_VALUE: 38
PIF_VALUE: 42
PIF_VALUE: 43
PIF_VALUE: 42
PIF_VALUE: 42
PIF_VALUE: 48
PIF_VALUE: 41
PIF_VALUE: 44
PIF_VALUE: 39
PIF_VALUE: 45
PIF_VALUE: 45
PIF_VALUE: 40
PIF_VALUE: 42
PIF_VALUE: 41
PIF_VALUE: 30
PIF_VALUE: 41
PIF_VALUE: 46
PIF_VALUE: 41
PIF_VALUE: 44
PIF_VALUE: 41
PIF_VALUE: 43
PIF_VALUE: 42
PIF_VALUE: 40
PIF_VALUE: 42
PIF_VALUE: 43

## 2022-01-01 ASSESSMENT — PAIN DESCRIPTION - DESCRIPTORS
DESCRIPTORS: THROBBING
DESCRIPTORS: OTHER (COMMENT)
DESCRIPTORS: ACHING
DESCRIPTORS: ACHING;CONSTANT
DESCRIPTORS: ACHING
DESCRIPTORS: THROBBING
DESCRIPTORS: SORE
DESCRIPTORS: ACHING;CONSTANT
DESCRIPTORS: THROBBING
DESCRIPTORS: ACHING
DESCRIPTORS: THROBBING
DESCRIPTORS: ACHING
DESCRIPTORS: SORE
DESCRIPTORS: ACHING
DESCRIPTORS: ACHING;CONSTANT
DESCRIPTORS: ACHING;DISCOMFORT
DESCRIPTORS: ACHING;SORE
DESCRIPTORS: ACHING;SORE;THROBBING
DESCRIPTORS: THROBBING
DESCRIPTORS: ACHING;CRAMPING
DESCRIPTORS: ACHING
DESCRIPTORS: ACHING
DESCRIPTORS: ACHING;CRAMPING
DESCRIPTORS: DISCOMFORT
DESCRIPTORS: SORE
DESCRIPTORS: ACHING;SORE;THROBBING
DESCRIPTORS: SORE
DESCRIPTORS: ACHING;CONSTANT
DESCRIPTORS: DISCOMFORT
DESCRIPTORS: ACHING;DISCOMFORT
DESCRIPTORS: ACHING;CONSTANT
DESCRIPTORS: ACHING
DESCRIPTORS: ACHING;DISCOMFORT;SORE
DESCRIPTORS: ACHING;CONSTANT
DESCRIPTORS: THROBBING
DESCRIPTORS: ACHING
DESCRIPTORS: ACHING;DISCOMFORT
DESCRIPTORS: SORE
DESCRIPTORS: ACHING;CONSTANT
DESCRIPTORS: ACHING;SPASM
DESCRIPTORS: ACHING;DISCOMFORT
DESCRIPTORS: DISCOMFORT
DESCRIPTORS: DISCOMFORT
DESCRIPTORS: ACHING;THROBBING;SORE

## 2022-01-01 ASSESSMENT — PAIN DESCRIPTION - ORIENTATION
ORIENTATION: LOWER
ORIENTATION: RIGHT
ORIENTATION: LEFT;DISTAL
ORIENTATION: RIGHT
ORIENTATION: UPPER
ORIENTATION: RIGHT
ORIENTATION: OTHER (COMMENT)
ORIENTATION: RIGHT
ORIENTATION: MID
ORIENTATION: MID
ORIENTATION: RIGHT
ORIENTATION: MID
ORIENTATION: RIGHT
ORIENTATION: MID;LOWER
ORIENTATION: LEFT
ORIENTATION: RIGHT
ORIENTATION: RIGHT

## 2022-01-01 ASSESSMENT — PAIN - FUNCTIONAL ASSESSMENT
PAIN_FUNCTIONAL_ASSESSMENT: PREVENTS OR INTERFERES SOME ACTIVE ACTIVITIES AND ADLS
PAIN_FUNCTIONAL_ASSESSMENT: ACTIVITIES ARE NOT PREVENTED
PAIN_FUNCTIONAL_ASSESSMENT: PREVENTS OR INTERFERES SOME ACTIVE ACTIVITIES AND ADLS
PAIN_FUNCTIONAL_ASSESSMENT: ACTIVITIES ARE NOT PREVENTED
PAIN_FUNCTIONAL_ASSESSMENT: PREVENTS OR INTERFERES SOME ACTIVE ACTIVITIES AND ADLS
PAIN_FUNCTIONAL_ASSESSMENT: ACTIVITIES ARE NOT PREVENTED
PAIN_FUNCTIONAL_ASSESSMENT: PREVENTS OR INTERFERES SOME ACTIVE ACTIVITIES AND ADLS
PAIN_FUNCTIONAL_ASSESSMENT: PREVENTS OR INTERFERES SOME ACTIVE ACTIVITIES AND ADLS
PAIN_FUNCTIONAL_ASSESSMENT: 0-10
PAIN_FUNCTIONAL_ASSESSMENT: ACTIVITIES ARE NOT PREVENTED
PAIN_FUNCTIONAL_ASSESSMENT: ACTIVITIES ARE NOT PREVENTED
PAIN_FUNCTIONAL_ASSESSMENT: PREVENTS OR INTERFERES SOME ACTIVE ACTIVITIES AND ADLS
PAIN_FUNCTIONAL_ASSESSMENT: PREVENTS OR INTERFERES SOME ACTIVE ACTIVITIES AND ADLS

## 2022-01-01 ASSESSMENT — PAIN DESCRIPTION - FREQUENCY
FREQUENCY: CONTINUOUS
FREQUENCY: INTERMITTENT
FREQUENCY: CONTINUOUS

## 2022-01-01 ASSESSMENT — PAIN DESCRIPTION - ONSET
ONSET: ON-GOING

## 2022-02-24 NOTE — TELEPHONE ENCOUNTER
Pt hasn't been seen since 2019. She stated that he O2 has not been working. I informed her that there isn't anything that we can do since she hasn't been seen in a while and her insurance company and DME supplier require that pt be seen within a year. She is scheduled with Kristan on 4/4/22 at 10:40am and with Selena at 10:30. Pt was last seen in the ED on 6/29/2021. VM was left for the pt informing her that an appt was scheduled for her with our RT at 10:30am on 4/4/22.  She was asked to arrive at the office around 10:20am.

## 2022-03-18 PROBLEM — R06.00 DYSPNEA: Status: ACTIVE | Noted: 2022-01-01

## 2022-03-18 PROBLEM — R06.02 SHORTNESS OF BREATH: Status: ACTIVE | Noted: 2022-01-01

## 2022-03-18 NOTE — ED NOTES
Pt updated on lab results and POC.  Pt remains on cont high flow NC     Liza Alvares RN  03/18/22 1500

## 2022-03-18 NOTE — ED NOTES
Pt at 78% on 8L NC. Pt placed on 15L NRB. RT called.      Renetta Figueroa RN  03/18/22 7670 Mak Clements RN  03/18/22 0464

## 2022-03-18 NOTE — ED PROVIDER NOTES
[address]  ED  eMERGENCY dEPARTMENT eNCOUnter   Attending Attestation     Pt Name: Fanta Mueller  MRN: [id number]  [place] 1975  Date of evaluation: 3/18/22       Fanta Mueller is a 55 y.o. female who presents with Shortness of Breath (pt to er c/o shortness of breath, on home oxygen )      History: Patient has shortness of breath. Patient experiences at home this morning woke up with a low oxygen saturation. Patient was not acting herself at home per her son. Patient sent here by EMS for further evaluation. Patient has oxygen saturation of 90 with oxygen per EMS. Exam: Heart rate and rhythm are regular. Lungs are diminished likely due to body habitus. Abdomen is soft, nontender. Patient awake alert acting appropriately. High flow nasal cannula started with some desatting. Plan for work-up including CT for PE and admission. EKG shows normal sinus rhythm with rate 94 beats minute. Normal axis. No ST elevation or depression. No blocks arrhythmias. T wave inversion in the lateral leads. Nonspecific EKG. I performed a history and physical examination of the patient and discussed management with the resident. I reviewed the residents note and agree with the documented findings and plan of care. Any areas of disagreement are noted on the chart. I was personally present for the key portions of any procedures. I have documented in the chart those procedures where I was not present during the key portions. I have personally reviewed all images and agree with the resident's interpretation. I have reviewed the emergency nurses triage note. I agree with the chief complaint, past medical history, past surgical history, allergies, medications, social and family history as documented unless otherwise noted below. Documentation of the HPI, Physical Exam and Medical Decision Making performed by medical students or scribes is based on my personal performance of the HPI, PE and MDM. For Phys Assistant/ Nurse Practitioner cases/documentation I have had a face to face evaluation of this patient and have completed at least one if not all key elements of the E/M (history, physical exam, and MDM). Additional findings are as noted. For APC cases I have personally evaluated and examined the patient in conjunction with the APC and agree with the treatment plan and disposition of the patient as recorded by the APC.     Faby Sarabia MD  Attending Emergency  Physician       Brenda Regan MD  03/18/22 3981

## 2022-03-18 NOTE — ED NOTES
Pt to er c/o difficulty breathing since waking this morning, pt is on home oxygen with 50 feet of tubing, the tubing was kinked at home. First responders found pt tachypneic with an oxygen sat at 78%, pt was placed on EMS oxygen with improvement to 90% on 4LNC. Pt reports this morning she felt lethargic, however is alert and oriented on arrival.pt placed on hospital oxygen and continuous monitor. Pt moved from EMS cot to stretcher with assistance.       Rickey Watkins RN  03/18/22 2474

## 2022-03-18 NOTE — ED PROVIDER NOTES
[facility] [address]  Emergency Department Encounter  EmergencyMedicine Resident     Pt Rozina Carbajal  MRN: [id number]  [place] 1975  Date of evaluation: 3/18/22  PCP:  Brandon Heaton DO    This patient was evaluated in the Emergency Department for symptoms described in the history of present illness. The patient was evaluated in the context of the global COVID-19 pandemic, which necessitated consideration that the patient might be at risk for infection with the SARS-CoV-2 virus that causes COVID-19. Institutional protocols and algorithms that pertain to the evaluation of patients at risk for COVID-19 are in a state of rapid change based on information released by regulatory bodies including the CDC and federal and state organizations. These policies and algorithms were followed during the patient's care in the ED. CHIEF COMPLAINT       Chief Complaint   Patient presents with    Shortness of Breath     pt to er c/o shortness of breath, on home oxygen        HISTORY OF PRESENT ILLNESS  (Location/Symptom, Timing/Onset, Context/Setting, Quality, Duration, Modifying Factors, Severity.)      Jose Palafox is a 55 y.o. female who presents with complaint of shortness of breath. Past medical history significant for CHF, HIEN, COPD, asthma, hyperlipidemia as well as hypertension. Patient was brought in via EMS when she became significantly short of breath. According to EMS patient's oxygen tubing was kinked and she was satting at 70%. This improved with new tubing and patient's oxygen saturations improved to 90%. Patient states she has been using more more oxygen at home recently and is extremely immobile due to weight. Does admit to some leg swelling bilaterally denies any chest pain fevers, chills, nausea, vomiting or diarrhea, abdominal pain, hemoptysis, recent surgeries. Patient has never had a blood clot before and is not any blood thinners.     PAST MEDICAL / SURGICAL / SOCIAL / FAMILY HISTORY has a past medical history of Acute on chronic diastolic CHF (congestive heart failure) ([facility] [address] HIEN (acute [facility]) [facility] HIEN (acute kidney injury) ([facility] Asthma, CHF, Chronic obstructive lung disease ([facility] [address] Chronic respiratory failure with hypoxia ([facility] COPD, Diabetes mellitus, new onset ([facility] Former smoker, HTN, Hyperglycemia, Hyperlipidemia, Hypertension, Morbid obesity with BMI of 60.0-69.9, adult ([facility] Neuromuscular disorder ([facility] STEPH on CPAP, Pedal edema, Pneumonia, Pulmonary hypertension, moderate to severe ([facility] Torn meniscus, and Wears glasses. has a past surgical history that includes  section (); Abdomen surgery; joint replacement; Cystoscopy (2019); Cystoscopy (N/A, 2019); hc cath power picc triple (2018); pr office/outpt visit,procedure only (N/A, 2018); Tracheotomy (2018); Gastrostomy tube placement (2018); and tracheostomy closure (2018).       Social History     Socioeconomic History    Marital status: Single     Spouse name: Not on file    Number of children: Not on file    Years of education: Not on file    Highest education level: Not on file   Occupational History    Not on file   Tobacco Use    Smoking status: Former Smoker     Packs/day: 1.00     Years: 22.00     Pack years: 22.00     Types: Cigarettes     Start date:      Quit date: 1/15/2018     Years since quittin.1    Smokeless tobacco: Never Used   Vaping Use    Vaping Use: Never used   Substance and Sexual Activity    Alcohol use: No    Drug use: No    Sexual activity: Not Currently   Other Topics Concern    Not on file   Social History Narrative    ** Merged History Encounter **          Social Determinants of Health     Financial Resource Strain:     Difficulty of Paying Living Expenses: Not on file   Food Insecurity:     Worried About [employer] in the Last Year: Not on file    Obdulio of Food in the Last Year: Not on file   Transportation Needs:  Lack of Transportation (Medical): Not on file    Lack of Transportation (Non-Medical): Not on file   Physical Activity:     Days of Exercise per Week: Not on file    Minutes of Exercise per Session: Not on file   Stress:     Feeling of Stress : Not on file   Social Connections:     Frequency of Communication with Friends and Family: Not on file    Frequency of Social Gatherings with Friends and Family: Not on file    Attends [employer] Services: Not on file    Active Member of Clubs or Organizations: Not on file    Attends Club or Organization Meetings: Not on file    Marital Status: Not on file   Intimate Partner Violence:     Fear of Current or Ex-Partner: Not on file    Emotionally Abused: Not on file    Physically Abused: Not on file    Sexually Abused: Not on file   Housing Stability:     Unable to Pay for Housing in the Last Year: Not on file    Number of [place] in the Last Year: Not on file    Unstable Housing in the Last Year: Not on file       Family History   Problem Relation Age of Onset    Emphysema Mother     Alzheimer's Disease Mother     Other Mother         Blood disorder    High Blood Pressure Father     Arthritis Father     Diabetes Sister     Heart Failure Sister     Kidney Disease Sister     High Blood Pressure Brother     Dementia Maternal Grandmother     High Blood Pressure Maternal Grandmother     Dementia Maternal Grandfather     High Blood Pressure Maternal Grandfather     Cancer Paternal Grandmother     Heart Disease Paternal Grandfather     Diabetes Sister     Heart Failure Sister     Other Sister         Intestinal problems    No Known Problems Sister     No Known Problems Son        Allergies:  Bee venom, Sulfa antibiotics, Asa [aspirin], Aspirin, Beta adrenergic blockers, Beta adrenergic blockers, and Sulfa antibiotics    Home Medications:  Prior to Admission medications    Medication Sig Start Date End Date Taking?  Authorizing Provider diazePAM (VALIUM) 5 MG tablet Take 5 mg by mouth every 12 hours as needed for Anxiety. Historical Provider, MD   folic acid (FOLVITE) 1 MG tablet Take 1 mg by mouth daily    Historical Provider, MD   magnesium oxide (MAG-OX) 400 MG tablet Take 400 mg by mouth 2 times daily    Historical Provider, MD   vitamin B-12 (CYANOCOBALAMIN) 1000 MCG tablet Take 1,000 mcg by mouth daily    Historical Provider, MD   Dulaglutide (TRULICITY) 4.11 AQ/0.9JO SOPN Inject 0.75 mg into the skin once a week    Historical Provider, MD   metOLazone (ZAROXOLYN) 2.5 MG tablet Take 2.5 mg by mouth every other day    Historical Provider, MD   glipiZIDE (GLUCOTROL XL) 2.5 MG extended release tablet Take 2.5 mg by mouth    Historical Provider, MD   cyclobenzaprine (FLEXERIL) 5 MG tablet  10/15/19   Historical Provider, MD   gabapentin (NEURONTIN) 400 MG capsule  10/23/19   Historical Provider, MD   SM MUCUS RELIEF 600 MG extended release tablet  10/15/19   Historical Provider, MD   nystatin (MYCOSTATIN) 375318 UNIT/GM cream  10/17/19   Historical Provider, MD   sertraline (ZOLOFT) 100 MG tablet sertraline 100 mg tablet    Historical Provider, MD   amLODIPine (NORVASC) 10 MG tablet  10/15/19   Historical Provider, MD   Ergocalciferol (VITAMIN D2 PO) Take 50,000 Units by mouth once a week    Historical Provider, MD   traZODone (DESYREL) 50 MG tablet TAKE 1 TABLET AT NIGHT 9/21/19   Hans Gutiérrez MD   potassium chloride (KLOR-CON M) 10 MEQ extended release tablet Take 1 tablet by mouth daily 7/25/19   Arlene Matters, MD   bumetanide (BUMEX) 1 MG tablet Take 2 mg by mouth 2 times daily    Historical Provider, MD   JANUVIA 50 MG tablet Take 50 mg by mouth daily 6/11/19   Historical Provider, MD   LORazepam (ATIVAN) 2 MG tablet Take 2 mg by mouth 2 times daily as needed.   3/29/19   Historical Provider, MD   melatonin 5 MG TABS tablet  5/7/19   Historical Provider, MD   glucose monitoring kit (FREESTYLE) monitoring kit 1 kit by Does not apply route daily 5/8/19   Leatha Bolanos MD   blood glucose monitor strips Test three  times a day & as needed for symptoms of irregular blood glucose. 5/8/19   Leatha Bolanos MD   Lancets MISC 1 each by Does not apply route daily 5/8/19   Leatha Bolanos MD   pantoprazole sodium (PROTONIX) 40 MG PACK packet Take 40 mg by mouth every morning (before breakfast)    Historical Provider, MD   spironolactone (ALDACTONE) 25 MG tablet Take 1 tablet by mouth daily 11/22/18   Lula Landeros MD   oxyCODONE-acetaminophen (PERCOCET)  MG per tablet Take 1 tablet by mouth every 6 hours as needed for Pain. Aguilar Welch Historical Provider, MD   isosorbide dinitrate (ISORDIL) 20 MG tablet Take 1 tablet by mouth 3 times daily 9/20/18   Stu Knight MD   hydrALAZINE (APRESOLINE) 25 MG tablet Take 1 tablet by mouth every 8 hours 9/20/18   Stu Knight MD   Blood Pressure KIT 1 Device by Does not apply route 2 times daily 9/20/18   Kary Lnicoln MD   atorvastatin (LIPITOR) 40 MG tablet Take 40 mg by mouth nightly    Historical Provider, MD   ferrous sulfate 325 (65 Fe) MG EC tablet Take 325 g by mouth 2 times daily (with meals) 4/20/18   Historical Provider, MD   gabapentin (NEURONTIN) 600 MG tablet Take 400 mg by mouth 3 times daily.  Take 2 cap three times a day    Historical Provider, MD   loratadine (CLARITIN) 10 MG tablet Take 10 mg by mouth daily    Historical Provider, MD   tiotropium (SPIRIVA) 18 MCG inhalation capsule Inhale 1 capsule into the lungs 8/31/17   Historical Provider, MD   sertraline (ZOLOFT) 100 MG tablet Take 100 mg by mouth daily    Historical Provider, MD   atorvastatin (LIPITOR) 40 MG tablet Take 1 tablet by mouth nightly 2/21/18   Kristen Barlow MD   albuterol sulfate  (90 Base) MCG/ACT inhaler Inhale 2 puffs into the lungs every 6 hours as needed for Wheezing    Historical Provider, MD   fluticasone (FLONASE) 50 MCG/ACT nasal spray 1 spray by Nasal route daily    Historical Provider, MD Abdominal:      Palpations: There is no hepatomegaly, splenomegaly or mass. Tenderness: There is no abdominal tenderness. There is no guarding or rebound. Musculoskeletal:      Right lower leg: No tenderness. Edema present. Left lower leg: No tenderness. Edema present. Comments: 2-3+ pitting edema bilateral lower extremities, no overlying erythema or color change   Skin:     Capillary Refill: Capillary refill takes less than 2 seconds. Coloration: Skin is not cyanotic or pale. Findings: No ecchymosis, erythema or rash. Nails: There is no clubbing. Neurological:      Mental Status: She is alert and oriented to person, place, and time. DIFFERENTIAL  DIAGNOSIS     PLAN (LABS / IMAGING / EKG):  Orders Placed This Encounter   Procedures    COVID-19, Rapid    XR CHEST PORTABLE    CT CHEST PULMONARY EMBOLISM W CONTRAST    VL DUP LOWER EXTREMITY VENOUS BILATERAL    CBC with Auto Differential    Basic Metabolic Panel w/ Reflex to MG    Troponin    Brain Natriuretic Peptide    D-Dimer, Quantitative    HCG Qualitative, Serum    Immature Platelet Fraction    BLOOD GAS, VENOUS    Telemetry monitoring - continuous duration    Inpatient consult to Internal Medicine    EKG 12 Lead    ADMIT TO INPATIENT       MEDICATIONS ORDERED:  Orders Placed This Encounter   Medications    iopamidol (ISOVUE-370) 76 % injection 85 mL       DDX: CHF exacerbation, ACS, PE, pneumonia, pneumothorax, esophageal rupture, thoracic aortic dissection    DIAGNOSTIC RESULTS / EMERGENCY DEPARTMENT COURSE / MDM   LAB RESULTS:  Results for orders placed or performed during the hospital encounter of 03/18/22   COVID-19, Rapid    Specimen: Nasopharyngeal Swab   Result Value Ref Range    Specimen Description . NASOPHARYNGEAL SWAB     SARS-CoV-2, Rapid Not Detected Not Detected   CBC with Auto Differential   Result Value Ref Range    WBC 11.5 (H) 3.5 - 11.3 k/uL    RBC 4.16 3.95 - 5.11 m/uL    Hemoglobin 10.5 (L) 11.9 - 15.1 g/dL    Hematocrit 37.2 36.3 - 47.1 %    MCV 89.4 82.6 - 102.9 fL    MCH 25.2 25.2 - 33.5 pg    MCHC 28.2 (L) 28.4 - 34.8 g/dL    RDW 14.8 (H) 11.8 - 14.4 %    Platelets See Reflexed IPF Result 138 - 453 k/uL    NRBC Automated 0.3 (H) 0.0 per 100 WBC    RBC Morphology ANISOCYTOSIS PRESENT     Immature Granulocytes 2 (H) 0 %    Seg Neutrophils 78 (H) 36 - 66 %    Lymphocytes 16 (L) 24 - 44 %    Monocytes 3 1 - 7 %    Eosinophils % 1 1 - 4 %    Basophils 0 0 - 2 %    Absolute Immature Granulocyte 0.23 0.00 - 0.30 k/uL    Segs Absolute 8.96 (H) 1.8 - 7.7 k/uL    Absolute Lymph # 1.84 1.0 - 4.8 k/uL    Absolute Mono # 0.35 0.1 - 0.8 k/uL    Absolute Eos # 0.12 0.0 - 0.4 k/uL    Basophils Absolute 0.00 0.0 - 0.2 k/uL    Morphology ANISOCYTOSIS PRESENT     Morphology 1+ POLYCHROMASIA    Basic Metabolic Panel w/ Reflex to MG   Result Value Ref Range    Glucose 164 (H) 70 - 99 mg/dL    BUN 18 6 - 20 mg/dL    CREATININE 0.99 (H) 0.50 - 0.90 mg/dL    Calcium 9.7 8.6 - 10.4 mg/dL    Sodium 138 135 - 144 mmol/L    Potassium 3.9 3.7 - 5.3 mmol/L    Chloride 90 (L) 98 - 107 mmol/L    CO2 31 20 - 31 mmol/L    Anion Gap 17 9 - 17 mmol/L    GFR Non-African American >60 >60 mL/min    GFR African American >60 >60 mL/min    GFR Comment         Troponin   Result Value Ref Range    Troponin, High Sensitivity 8 0 - 14 ng/L   Troponin   Result Value Ref Range    Troponin, High Sensitivity 10 0 - 14 ng/L   Brain Natriuretic Peptide   Result Value Ref Range    Pro-BNP 1,086 (H) <300 pg/mL   D-Dimer, Quantitative   Result Value Ref Range    D-Dimer, Quant 0.79 mg/L FEU   HCG Qualitative, Serum   Result Value Ref Range    hCG Qual NEGATIVE NEGATIVE   Immature Platelet Fraction   Result Value Ref Range    Platelet, Immature Fraction 6.1 1.1 - 10.3 %    Platelet, Fluorescence 289 138 - 453 k/uL   BLOOD GAS, VENOUS   Result Value Ref Range    pH, Zaid 7.262 (L) 7.320 - 7.420    pCO2, Zaid 85.0 (H) 39 - 55    pO2, Zaid 40.6 30 - 50    HCO3, Venous 37.0 (H) 24 - 30 mmol/L    Positive Base Excess, Zaid 8.1 (H) 0.0 - 2.0 mmol/L    O2 Sat, Zaid 69.3 60.0 - 85.0 %    Carboxyhemoglobin 1.3 0 - 5 %    Pt Temp 37.0     FIO2 INFORMATION NOT PROVIDED        IMPRESSION: Slight acidosis likely secondary to retention. ACS work-up negative. Slightly elevated D-dimer. Otherwise labs grossly unremarkable. RADIOLOGY:  XR CHEST PORTABLE    Result Date: 3/18/2022  EXAMINATION: ONE XRAY VIEW OF THE CHEST 3/18/2022 11:16 am COMPARISON: Chest x-ray dated 29 June 2021 HISTORY: ORDERING SYSTEM PROVIDED HISTORY: sob TECHNOLOGIST PROVIDED HISTORY: sob FINDINGS: Mild stable cardiomegaly with pulmonary vascular congestion. No pneumothorax or pleural effusion. Stable cardiomegaly with mild pulmonary vascular congestion. CT CHEST PULMONARY EMBOLISM W CONTRAST    Result Date: 3/18/2022  EXAMINATION: CTA OF THE CHEST 3/18/2022 1:23 pm TECHNIQUE: CTA of the chest was performed after the administration of intravenous contrast.  Multiplanar reformatted images are provided for review. MIP images are provided for review. Dose modulation, iterative reconstruction, and/or weight based adjustment of the mA/kV was utilized to reduce the radiation dose to as low as reasonably achievable. COMPARISON: None HISTORY: ORDERING SYSTEM PROVIDED HISTORY: sedentary lifestyle, leg swelling, increased O2 TECHNOLOGIST PROVIDED HISTORY: sedentary lifestyle, leg swelling, increased O2 Decision Support Exception - unselect if not a suspected or confirmed emergency medical condition->Emergency Medical Condition (MA) Reason for Exam: sedentary lifestyle, leg swelling, increased O2 FINDINGS: Pulmonary Arteries: Pulmonary arteries are adequately opacified for evaluation. No evidence of intraluminal filling defect to suggest pulmonary embolism. Main pulmonary artery is normal in caliber. Mediastinum: No evidence of mediastinal lymphadenopathy.   Small reactive lymph nodes seen in the mediastinum and hilar regions. The heart and pericardium demonstrate no acute abnormality. There is no acute abnormality of the thoracic aorta. Lungs/pleura: Patchy ground-glass opacity and increased markings seen in the lower lung fields bilaterally concerning for bibasilar infiltrate. No pleural effusion or pneumothorax. No pulmonary mass or nodule. Patchy ground-glass opacity in the upper lung fields bilaterally. Upper Abdomen: Limited images of the upper abdomen are unremarkable. Soft Tissues/Bones: No acute bone or soft tissue abnormality. Degenerative changes seen within the spine with no chest wall abnormality. No evidence of pulmonary embolism. There is patchy ill-defined opacification lower lung fields bilaterally suggesting infiltrate with ground-glass opacity concerning for pneumonia as well in the upper lung fields. Reactive adenopathy throughout the mediastinum and hilar regions. RECOMMENDATIONS: Unavailable     EKG  EKG Interpretation    Interpreted by me    Rhythm: normal sinus   Rate: normal  Axis: normal  Ectopy: none  Conduction: normal  ST Segments: no acute change  T Waves: no acute change  Q Waves: none    Clinical Impression: no acute changes and normal EKG    All EKG's are interpreted by the Emergency Department Physician who either signs or Co-signs this chart in the absence of a cardiologist.    EMERGENCY DEPARTMENT COURSE:  Patient is a 51-year-old female who presents with chief complaint of shortness of breath. On examination patient is visibly tachypneic with equal chest rise and fall. Patient had episode of desaturation was placed on high flow nasal cannula with improvement in oxygen saturations. ACS work-up unremarkable. CT scan of the chest concerning for pneumonia. Pulmonary embolism study negative though. Given patient's increased oxygen requirement patient admitted to internal medicine service for further management.   Patient amenable to admission. PROCEDURES:  n/a    CONSULTS:  IP CONSULT TO INTERNAL MEDICINE    CRITICAL CARE:  n/a    FINAL IMPRESSION      1. Dyspnea, unspecified type          DISPOSITION / PLAN     DISPOSITION Admitted 03/18/2022 02:13:26 PM      PATIENT REFERRED TO:  No follow-up provider specified.     DISCHARGE MEDICATIONS:  Current Discharge Medication List          Veronique Clement DO  Emergency Medicine Resident    (Please note that portions of thisnote were completed with a voice recognition program.  Efforts were made to edit the dictations but occasionally words are mis-transcribed.)        Kai Cruz DO  Resident  03/18/22 [id number]

## 2022-03-18 NOTE — ED NOTES
Pt to er c/o difficulty breathing and lethargy this morning. Pt reports that she is typically on home oxygen with over 50ft of o2 tubing at home. Per EMS pt's initial o2 was 74%, however her tubing was kinked. Once placed on EMS O2 pt oxygen sat improved to 85-88. Pt reports she typically lives at 88%. Pt arrives alert and oriented, reports improved feeling with oxygen. Pt placed on cont monitor and O2 here, physician at bedside.       Kamron Michael RN  03/18/22 [id number]

## 2022-03-18 NOTE — ED NOTES
Pt returned from CT scan with an oxygen saturation of 81%. Pt placed back on high flow with immediate improvement to 88%.  Physician at bedside     Ferny Anand [facility]  03/18/22 [id number]

## 2022-03-19 PROBLEM — E87.6 HYPOKALEMIA: Status: ACTIVE | Noted: 2022-01-01

## 2022-03-19 PROBLEM — E83.42 HYPOMAGNESEMIA: Status: ACTIVE | Noted: 2022-01-01

## 2022-03-19 PROBLEM — E78.5 HYPERLIPIDEMIA: Status: ACTIVE | Noted: 2022-01-01

## 2022-03-19 PROBLEM — R73.03 PREDIABETES: Status: ACTIVE | Noted: 2022-01-01

## 2022-03-19 NOTE — PROGRESS NOTES
Sahil Ruby  Internal Medicine Teaching Residency Program  Inpatient Daily Progress Note  ______________________________________________________________________________    Patient: Hailey Gonzalez  YOB: 1975   EWD:[id number]    Acct: [de-identified]     Room: 2008/2008-01  Admit date: 3/18/2022  Today's date: 03/19/22  Number of days in the hospital: 1    SUBJECTIVE   CC: Shortness of Breath (pt to er c/o shortness of breath, on home oxygen )    Pt examined at bedside. Chart & results reviewed. - VSS, pt is saturating well on heated high flow with SPO2 96%, FiO2 85 and flow rate 30 L/min.  - labs reviewed -sodium 141, potassium 3.5, creatinine 1.06, glucose 109,  - no acute events overnight.   - Patient denies headache, vision problems, nausea, vomiting, chest pain, cough, abdominal pain, changes in bowel or urinary habits, and swelling.       ROS:  General ROS: Completed and except as mentioned above were negative   HEENT ROS: Completed and except as mentioned above were negative   Allergy and Immunology ROS:  Completed and except as mentioned above were negative  Hematological and Lymphatic ROS:  Completed and except as mentioned above were negative  Respiratory ROS:  Completed and except as mentioned above were negative  Cardiovascular ROS:  Completed and except as mentioned above were negative  Gastrointestinal ROS: Completed and except as mentioned above were negative  Genito-Urinary ROS:  Completed and except as mentioned above were negative  Musculoskeletal ROS:  Completed and except as mentioned above were negative  Neurological ROS:  Completed and except as mentioned above were negative  Skin & Dermatological ROS:  Completed and except as mentioned above were negative  Psychological ROS:  Completed and except as mentioned above were negative  BRIEF HISTORY     The patient is a pleasant 55 y.o. female presents with a chief complaint of shortness of breath. Past medical history significant for CHF, HIEN, COPD on 3-4 L of oxygen, asthma, hyperlipidemia, hypertension. Patient states that she usually uses 3 to 4 L of oxygen. Patient was brought in through the EMS when she became significantly short of breath this morning and her saturation dropped to low 70s and high 60s. .  According to the EMS, patient's oxygen tubing was kinked and she was saturating at 70%. This improved with new tubing and patient's oxygen saturation improved to 90%. Patient states that EMS placed her on a nonrebreather mask.     Patient states that she experiences chest pain occasionally- the pain is substernal, is at rest and sometimes pain decreases with changing position. Patient states that the pain is sudden in onset describes the character as a pressure-like sensation, it radiates to the back.     Patient denies fever, chest pain, nausea, vomiting, changes in a bowel or bladder habits.      On examination patient is afebrile, hemodynamically stable with /65, heart rate 89 and saturating with SPO2 96 on high flow nasal cannula with FiO2 90 flow rate 30 L/min.     Relevant labs include sodium 138, potassium 3.9, chloride 90, creatinine 0.99, glucose 164, procalcitonin 0.25, proBNP 1086, WBC 11.5, hemoglobin 10.5, platelet [id number]     VBG shows pH of 7.26, PCO2 85, PO2 40.6, HCO3 37     OBJECTIVE     Vital Signs:  /65   Pulse 89   Temp 98.8 °F (37.1 °C) (Axillary)   Resp 18   Ht 5' 6\" (1.676 m)   Wt (!) 354 lb 8 oz (160.8 kg)   SpO2 96%   BMI 57.22 kg/m²     Temp (24hrs), Av.6 °F (37 °C), Min:97.9 °F (36.6 °C), Max:99.1 °F (37.3 °C)    In: 230   Out: 1150 [Urine:1150]    Physical Exam:  Constitutional: This is a well developed, well nourished, Greater than 40 - Morbid Obesity / Extreme Obesity / Grade III 55y.o. year old female who is alert, oriented, cooperative and in no apparent distress. Head:normocephalic and atraumatic. EENT:  PERRLA.   No conjunctival injections. Septum was midline, mucosa was without erythema, exudates or cobblestoning. No thrush was noted. Neck: Supple without thyromegaly. No elevated JVP. Trachea was midline. Respiratory: Chest was symmetrical without dullness to percussion. Breath sounds bilaterally were clear to auscultation. There were no wheezes, rhonchi or rales. There is no intercostal retraction or use of accessory muscles. Cardiovascular: Regular without murmur, clicks, gallops or rubs. Abdomen: Slightly rounded and soft. No rebound, rigidity or guarding was appreciated. Lymphatic: No lymphadenopathy. Musculoskeletal: Normal curvature of the spine. No gross muscle weakness. Extremities:  No lower extremity edema, ulcerations, tenderness, varicosities or erythema. Muscle size, tone and strength are normal.  No involuntary movements are noted. Skin:  Warm and dry. Good color, turgor and pigmentation. No lesions or scars.   No cyanosis or clubbing  Neurological/Psychiatric: The patient's general behavior, level of consciousness, thought content and emotional status is normal.        Medications:  Scheduled Medications:    levofloxacin  750 mg IntraVENous Q24H    sodium chloride flush  5-40 mL IntraVENous 2 times per day    enoxaparin  30 mg SubCUTAneous BID    amLODIPine  5 mg Oral Daily    atorvastatin  40 mg Oral Nightly    budesonide-formoterol  2 puff Inhalation BID    fluticasone  1 spray Nasal Daily    folic acid  1 mg Oral Daily    gabapentin  300 mg Oral TID    isosorbide dinitrate  20 mg Oral TID    pantoprazole  40 mg Oral QAM AC    sertraline  100 mg Oral Daily    spironolactone  25 mg Oral Daily    tiotropium  2 puff Inhalation Daily    guaiFENesin  600 mg Oral BID    docusate sodium  100 mg Oral Daily     Continuous Infusions:    sodium chloride       PRN MedicationsLORazepam, 1 mg, Q4H PRN  sodium chloride flush, 5-40 mL, PRN  sodium chloride, 25 mL, PRN  ondansetron, 4 mg, Q8H PRN   Or  ondansetron, 4 mg, Q6H PRN  polyethylene glycol, 17 g, Daily PRN  acetaminophen, 650 mg, Q6H PRN   Or  acetaminophen, 650 mg, Q6H PRN  oxyCODONE-acetaminophen, 2 tablet, Q6H PRN  sodium chloride, 1 spray, PRN  albuterol sulfate HFA, 2 puff, Q6H PRN        Diagnostic Labs:  CBC:   Recent Labs     03/18/22  1131   WBC 11.5*   RBC 4.16   HGB 10.5*   HCT 37.2   MCV 89.4   RDW 14.8*   PLT See Reflexed IPF Result     BMP:   Recent Labs     03/18/22  1131      K 3.9   CL 90*   CO2 31   BUN 18   CREATININE 0.99*     BNP: No results for input(s): BNP in the last 72 hours. PT/INR: No results for input(s): PROTIME, INR in the last 72 hours. APTT: No results for input(s): APTT in the last 72 hours. CARDIAC ENZYMES: No results for input(s): CKMB, CKMBINDEX, TROPONINI in the last 72 hours. Invalid input(s): CKTOTAL;3  FASTING LIPID PANEL:  Lab Results   Component Value Date    CHOL 144 11/17/2018    HDL 42 10/07/2020    TRIG 68 11/17/2018     LIVER PROFILE: No results for input(s): AST, ALT, ALB, BILIDIR, BILITOT, ALKPHOS in the last 72 hours. MICROBIOLOGY:   Lab Results   Component Value Date/Time    CULTURE NO GROWTH 6 DAYS 06/29/2021 07:03 PM       Imaging:    XR CHEST PORTABLE    Result Date: 3/19/2022  Findings suggestive of worsening bibasilar pneumonia. XR CHEST PORTABLE    Result Date: 3/18/2022  Stable cardiomegaly with mild pulmonary vascular congestion. CT CHEST PULMONARY EMBOLISM W CONTRAST    Result Date: 3/18/2022  No evidence of pulmonary embolism. There is patchy ill-defined opacification lower lung fields bilaterally suggesting infiltrate with ground-glass opacity concerning for pneumonia as well in the upper lung fields. Reactive adenopathy throughout the mediastinum and hilar regions. RECOMMENDATIONS: Unavailable       ASSESSMENT & PLAN     Principal Problem:    Shortness of breath  Active Problems:    Dyspnea  Resolved Problems:    * No resolved hospital problems. *    1. Pneumonia: Patient is saturating with SPO2 96 on high flow nasal cannula with FiO2 90 flow rate 30 L/min. Patient on levofloxacin 750 mg intravenous every 24 hours. Will order PCR for covid. Will also order respiratory viral panel. 2. Hypertension: Patient on Norvasc 5 mg daily. 3. Hyperlipidemia patient on Lipitor 40 mg daily. 4. Congestive heart failure-patient on Isordil and Aldactone. Start on Bumex 2 mg IV. 5. COPD-patient on albuterol inhaler every 6 hours as needed. Patient on Symbicort inhaler twice daily. Patient on Spiriva inhaler daily. 6. Hypokalemia-potassium replaced.       DVT ppx: Lovenox  GI ppx: Protonix     PT/OT/SW consulted  Discharge Planning: Juan Luis Moreno MD  PGY-1, Internal Medicine Resident  Ruby Clements         3/19/2022, 3:24 AM       I have discussed the care of Lori Barrow , including pertinent history and exam findings,    today with the resident. I have seen and examined the patient and the key elements of all parts of the encounter have been performed by me . I agree with the assessment, plan and orders as documented by the resident. Principal Problem:    Shortness of breath  Active Problems:    Pneumonia    HTN (hypertension)    COPD (chronic obstructive pulmonary disease) (HCC)    Pulmonary hypertension, moderate to severe (HCC)    Morbid obesity with BMI of 50.0-59.9, adult (HCC)    STEPH (obstructive sleep apnea)    Normocytic anemia    Dyspnea    Prediabetes    Hyperlipidemia    Hypokalemia    Hypomagnesemia  Resolved Problems:    * No resolved hospital problems. *        Overall  course ;                                   are improving over time.         Patient has bilateral pulmonary infiltrate  On IV diuretics  Respiratory viral panel  Hypercapnic respiratory failure on BiPAP  Pulmonary medicine consult  Prognosis poor          Electronically signed by Olive Paiz MD

## 2022-03-19 NOTE — PROGRESS NOTES
Writer assessed patient. Patient informed writer that patient would like to be a full code NO intubation.

## 2022-03-19 NOTE — CARE COORDINATION
Case Management Initial Discharge Plan  Jerod Hernandez,             Met with:patient to discuss discharge plans. Information verified: address, contacts, phone number, , insurance Yes  Insurance Provider: [facility]    Emergency Contact/Next of Kin name & number: parent Benjamin Mendes [phone]  Who are involved in patient's support system? family    PCP: Oliver Martinez DO  Date of last visit: earlier this week      Discharge Planning    Living Arrangements:        Home has 2 stories  0 stairs to climb to get into front door, has ramp 0 stairs to climb to reach second floor  Location of bedroom/bathroom in home main  level    Patient able to perform ADL's:Assisted    Current Services (outpatient & in home) [facility]  DME equipment: WC, walker, Oxygen  Would like motorized scooter  DME provider: [facility]    Is patient receiving oral anticoagulation therapy? No          Potential Assistance Needed:       Patient agreeable to home care: Yes  Freedom of choice provided:  yes, current with [facility]    Prior SNF/Rehab Placement and Facility: no  Agreeable to SNF/Rehab: No  [place] of choice provided: n/a     Evaluation: n/a    Expected Discharge date:       Patient expects to be discharged to:home      If home: is the family and/or caregiver wiling & able to provide support at home? yes  Who will be providing this support? FAMILY    Follow Up Appointment: Best Day/ Time:      Transportation provider: [address] [place] [address]  Transportation arrangements needed for discharge: Yes    Readmission Risk              Risk of Unplanned Readmission:  28             Does patient have a readmission risk score greater than 14?: Yes  If yes, follow-up appointment must be made within 7 days of discharge.      Goals of Care: Safety, improve breathing      Educated patient on transitional options, provided freedom of choice and are agreeable with plan      Discharge Plan: home with [facility], will need transportation          Electronically signed by Jeni Taylor RN on 3/19/22 at 5:15 PM EDT

## 2022-03-19 NOTE — CARE COORDINATION
Transitional planning    Writer attempted to do intake assessment, patient sound asleep and on bipap, will reattempt if time allows

## 2022-03-19 NOTE — PROCEDURES
INSTRUMENTAL SWALLOW REPORT  MODIFIED BARIUM SWALLOW    NAME: Jono Ramsey   : 1975  MRN: [id number]       Date of Eval: 3/19/2022        Radiologist: Dr. Ijeoma Mckinnon     Referring Diagnosis(es):      Past Medical History:  has a past medical history of Acute on chronic diastolic CHF (congestive heart failure) ([address] HIEN (acute kidney injury) ([address] HIEN (acute kidney injury) ([address] Asthma, CHF, Chronic obstructive lung disease ([address] Chronic respiratory failure with hypoxia ([address] COPD, Diabetes mellitus, new onset ([address] Former smoker, HTN, Hyperglycemia, Hyperlipidemia, Hypertension, Morbid obesity with BMI of 60.0-69.9, adult ([address] Neuromuscular disorder ([address] STEPH on CPAP, Pedal edema, Pneumonia, Pulmonary hypertension, moderate to severe ([address] Torn meniscus, and Wears glasses. Past Surgical History:  has a past surgical history that includes  section (); Abdomen surgery; joint replacement; Cystoscopy (2019); Cystoscopy (N/A, 2019); hc cath power picc triple (2018); pr office/outpt visit,procedure only (N/A, 2018); Tracheotomy (2018); Gastrostomy tube placement (2018); and tracheostomy closure (2018). Current Diet Solid Consistency: NPO  Current Diet Liquid Consistency: NPO    Type of Study: Initial MBS    Recent CXR/CT of Chest: 3/19/2022    Impression   Findings suggestive of worsening bibasilar pneumonia.         Patient Complaints/Reason for Referral:  Jono Ramsey was referred for a MBS to assess the efficiency of his/her swallow function, assess for aspiration, and to make recommendations regarding safe dietary consistencies, effective compensatory strategies, and safe eating environment. Onset of problem:   The patient is a pleasant 55 y.o. female presents with a chief complaint of shortness of breath. Past medical history significant for CHF, HIEN, COPD on 3-4 L of oxygen, asthma, hyperlipidemia, hypertension.   Patient states that she usually uses 3 to 4 L of oxygen. Patient was brought in through the EMS when she became significantly short of breath this morning and her saturation dropped to low 70s and high 60s. .  According to the EMS, patient's oxygen tubing was kinked and she was saturating at 70%. This improved with new tubing and patient's oxygen saturation improved to 90%. Patient states that EMS placed her on a nonrebreather mask.     Patient states that she experiences chest pain occasionally- the pain is substernal, is at rest and sometimes pain decreases with changing position. Patient states that the pain is sudden in onset describes the character as a pressure-like sensation, it radiates to the back.     Patient denies fever, chest pain, nausea, vomiting, changes in a bowel or bladder habits.      On examination patient is afebrile, hemodynamically stable with /65, heart rate 89 and saturating with SPO2 96 on high flow nasal cannula with FiO2 90 flow rate 30 L/min.     Relevant labs include sodium 138, potassium 3.9, chloride 90, creatinine 0.99, glucose 164, procalcitonin 0.25, proBNP 1086, WBC 11.5, hemoglobin 10.5, platelet [id number]    Behavior/Cognition/Vision/Hearing:  Behavior/Cognition: Cooperative; Alert  Vision: Within Functional Limits  Hearing: Within functional limits    Impressions:  Patient presents with safe swallow for Regular diet with thin liquids with meds in puree as evidenced by no aspiration noted with consistencies tested. Pt reports pills are difficult to take w/ liquids, recommend whole/crushed in puree as able. Questionable instance of +flash penetration, no aspiration x1 of thin liquids via rapid straw, however difficult to visualize. No penetration, no aspiration with all other consistencies. +min residual in vallecula with puree, soft and regular solids. Recommend small sips and bites, only feed when alert and awake and upright at 90 degrees for all PO intake.   Recommend close monitoring for overt/clinical s/s of aspiration and D/C PO intake and complete Modified Barium Swallow Study should they occur. Results and recommendations reported to RN. Patient Position: Lateral and Patient Degrees: 90    Consistencies Administered: Dysphagia Soft and Bite-Sized (Dysphagia III); Reg solid; Dysphagia Pureed (Dysphagia I); Nectar straw; Thin straw    Dysphagia Outcome Severity Scale: Level 6: Within functional limits/Modified independence  Penetration-Aspiration Scale (PAS): 1 - Material does not enter the airway    Recommended Diet:  Solid consistency: Regular  Liquid consistency: Thin  Liquid administration via: Cup;Straw  Medication administration: PO    Safe Swallow Protocol:  Compensatory Swallowing Strategies: Upright as possible for all oral intake;Small bites/sips      Recommendations/Treatment  Requires SLP Intervention: Yes  D/C Recommendations: Further therapy recommended at discharge. Recommended Exercises:    Therapeutic Interventions: Diet tolerance monitoring;Patient/Family education     Education: Images and recommendations were reviewed with pt, RN, ansd radiologist following this exam.   Patient Education: yes  Patient Education Response: Verbalizes understanding    Prognosis  Prognosis for safe diet advancement: fair  Duration/Frequency of Treatment  Duration/Frequency of Treatment: 1-2x per week      Goals:    Long Term:   To Maximize safety with intake, optimize nutrition/hydration and minimize risk for aspiration. Short Term:  Dysphagia Goals: The patient will tolerate recommended diet without observed clinical signs of aspiration    Oral Preparation / Oral Phase  Oral Phase: WFL  Oral Phase: Oral transit and bolus manipulation WFL for all consistencies tested    Pharyngeal Phase  Pharyngeal Phase: WFL  Pharyngeal: No penetration, no aspiration with all consistencies tested. +min residual in vallecula with puree, soft and regular solids. No significant residual w/ liquid consistencies.  Questionable instance of +flash penetration, however no aspiration w/ thin liquids w/ rapid sips from straw. Image is difficult to visualize, however all other images of thin liquids reveal no penetration, no aspiraiton.     Esophageal Phase  Esophageal Screen: WFL      Pain   Patient Currently in Pain: No  Pain Level: 4  Pain Type: Chronic pain  Pain Location: Knee      Therapy Time:   Individual Concurrent Group Co-treatment   Time In 1337         Time Out 1345         Minutes 8                Karen Gan, KIRA, 3/19/2022, 2:35 PM

## 2022-03-19 NOTE — H&P
[address] [facility]     Department of Internal Medicine - Staff Internal Medicine Teaching Service          ADMISSION NOTE/HISTORY AND PHYSICAL EXAMINATION   Date: 3/18/2022  Patient Name: Jenny Gomes  Date of admission: 3/18/2022 10:51 AM  YOB: 1975  PCP: Telma Saldana DO  History Obtained From:  patient, electronic medical record    CHIEF COMPLAINT     Chief complaint: Shortness of breath    HISTORY OF PRESENTING ILLNESS     The patient is a pleasant 55 y.o. female presents with a chief complaint of shortness of breath. Past medical history significant for CHF, HIEN, COPD on 3-4 L of oxygen, asthma, hyperlipidemia, hypertension. Patient states that she usually uses 3 to 4 L of oxygen. Patient was brought in through the EMS when she became significantly short of breath this morning and her saturation dropped to low 70s and high 60s. .  According to the EMS, patient's oxygen tubing was kinked and she was saturating at 70%. This improved with new tubing and patient's oxygen saturation improved to 90%. Patient states that EMS placed her on a nonrebreather mask. Patient states that she experiences chest pain occasionally- the pain is substernal, is at rest and sometimes pain decreases with changing position. Patient states that the pain is sudden in onset describes the character as a pressure-like sensation, it radiates to the back. Patient denies fever, chest pain, nausea, vomiting, changes in a bowel or bladder habits. On examination patient is afebrile, hemodynamically stable with /65, heart rate 89 and saturating with SPO2 96 on high flow nasal cannula with FiO2 90 flow rate 30 L/min.     Relevant labs include sodium 138, potassium 3.9, chloride 90, creatinine 0.99, glucose 164, procalcitonin 0.25, proBNP 1086, WBC 11.5, hemoglobin 10.5, platelet [id number]    VBG shows pH of 7.26, PCO2 85, PO2 40.6, HCO3 37    CT Chest:    No evidence of pulmonary embolism. [place] is patchy ill-defined opacification   lower lung fields bilaterally suggesting infiltrate with ground-glass opacity   concerning for pneumonia as well in the upper lung fields.  Reactive   adenopathy throughout the mediastinum and hilar regions.      X-ray showed stable cardiomegaly with mild vascular congestion      Review of Systems:  General ROS: Completed and except as mentioned above were negative   HEENT ROS: Completed and except as mentioned above were negative   Allergy and Immunology ROS:  Completed and except as mentioned above were negative  Hematological and Lymphatic ROS:  Completed and except as mentioned above were negative  Respiratory ROS:  Completed and except as mentioned above were negative  Cardiovascular ROS:  Completed and except as mentioned above were negative  Gastrointestinal ROS: Completed and except as mentioned above were negative  Genito-Urinary ROS:  Completed and except as mentioned above were negative  Musculoskeletal ROS:  Completed and except as mentioned above were negative  Neurological ROS:  Completed and except as mentioned above were negative  Skin & Dermatological ROS:  Completed and except as mentioned above were negative  Psychological ROS:  Completed and except as mentioned above were negative    PAST MEDICAL HISTORY     Past Medical History:   Diagnosis Date    Acute on chronic diastolic CH[facility]congestive [facility] failure[facility] 9/15/2018    HIEN (acute kidney injury) ([facility] 5/6/2019    HIEN (acute kidney injury) ([facility] 11/20/2018    Asthma     CHF     [facility]     Chronic respiratory failure with hypoxia ([facility]     on home O2 therapy    COPD     Diabetes mellitus, new onset ([facility] 5/6/2019    Former smoker     quit smoking about 17 months ago/ She has a 22.00 pack-year smoking history    HTN     Hyperglycemia     Hyperlipidemia     Hypertension     Morbid obesity with BMI of 60.0-69.9, adult ([facility]     Neuromuscular disorder ([facility]     Neuropathy Right hand    STEPH on CPAP     Pedal edema     Pneumonia     Pulmonary hypertension, moderate to severe ([facility] 06/09/2018    Torn meniscus     Wears glasses        PAST SURGICAL HISTORY     Past Surgical History:   Procedure Laterality Date    ABDOMEN SURGERY      Patient had a PEG tube placed 1/2018, removed 4/2018    [address]    CYSTOSCOPY  June 5, 2019    CYSTOSCOPY N/A 6/5/2019    CYSTOSCOPY performed by Milad Ray MD at [facility]  02/2018    Removed in April 2018    [facility] CATH POWER PICC TRIPLE  2/2/2018         JOINT REPLACEMENT      AL OFFICE/OUTPT VISIT,PROCEDURE ONLY N/A 2/17/2018    TRACHEOTOMY performed by Anand Hummel MD at [address]  03/2018    TRACHEOTOMY  02/2018       ALLERGIES     Bee venom, Sulfa antibiotics, Asa [aspirin], Aspirin, Beta adrenergic blockers, Beta adrenergic blockers, and Sulfa antibiotics    MEDICATIONS PRIOR TO ADMISSION     Prior to Admission medications    Medication Sig Start Date End Date Taking? Authorizing Provider   diazePAM (VALIUM) 5 MG tablet Take 5 mg by mouth every 12 hours as needed for Anxiety.     Historical Provider, MD   folic acid (FOLVITE) 1 MG tablet Take 1 mg by mouth daily    Historical Provider, MD   magnesium oxide (MAG-OX) 400 MG tablet Take 400 mg by mouth 2 times daily  Patient not taking: Reported on 3/18/2022    Historical Provider, MD   vitamin B-12 (CYANOCOBALAMIN) 1000 MCG tablet Take 1,000 mcg by mouth daily    Historical Provider, MD   Dulaglutide (TRULICITY) 5.63 AA/3.9KE SOPN Inject 0.75 mg into the skin once a week    Historical Provider, MD   metOLazone (ZAROXOLYN) 2.5 MG tablet Take 2.5 mg by mouth every other day    Historical Provider, MD   glipiZIDE (GLUCOTROL XL) 2.5 MG extended release tablet Take 2.5 mg by mouth    Historical Provider, MD   cyclobenzaprine (FLEXERIL) 5 MG tablet  10/15/19   Historical Provider, MD   gabapentin (NEURONTIN) 400 MG capsule  10/23/19   Historical Provider, MD   SM MUCUS RELIEF 600 MG extended release tablet  10/15/19   Historical Provider, MD   nystatin (MYCOSTATIN) 728768 UNIT/GM cream  10/17/19   Historical Provider, MD   sertraline (ZOLOFT) 100 MG tablet sertraline 100 mg tablet    Historical Provider, MD   amLODIPine (NORVASC) 10 MG tablet  10/15/19   Historical Provider, MD   Ergocalciferol (VITAMIN D2 PO) Take 50,000 Units by mouth once a week  Patient not taking: Reported on 3/18/2022    Historical Provider, MD   traZODone (DESYREL) 50 MG tablet TAKE 1 TABLET AT NIGHT  Patient not taking: Reported on 3/18/2022 9/21/19   Nia Moreno MD   potassium chloride (KLOR-CON M) 10 MEQ extended release tablet Take 1 tablet by mouth daily 7/25/19   Eric Vernon MD   bumetanide (BUMEX) 1 MG tablet Take 2 mg by mouth 2 times daily    Historical Provider, MD   JANUVIA 50 MG tablet Take 50 mg by mouth daily 6/11/19   Historical Provider, MD   LORazepam (ATIVAN) 2 MG tablet Take 2 mg by mouth 2 times daily as needed. Patient not taking: Reported on 3/18/2022 3/29/19   Historical Provider, MD   melatonin 5 MG TABS tablet  5/7/19   Historical Provider, MD   glucose monitoring kit (FREESTYLE) monitoring kit 1 kit by Does not apply route daily 5/8/19   Mami Moctezuma MD   blood glucose monitor strips Test three  times a day & as needed for symptoms of irregular blood glucose. 5/8/19   Mami Moctezuma MD   Lancets MISC 1 each by Does not apply route daily 5/8/19   Mami Moctezuma MD   pantoprazole sodium (PROTONIX) 40 MG PACK packet Take 40 mg by mouth every morning (before breakfast)    Historical Provider, MD   spironolactone (ALDACTONE) 25 MG tablet Take 1 tablet by mouth daily 11/22/18   Ed Contreras MD   oxyCODONE-acetaminophen (PERCOCET)  MG per tablet Take 1 tablet by mouth every 6 hours as needed for Pain. Ade Alanis     Historical Provider, MD   isosorbide dinitrate (ISORDIL) 20 MG tablet Take 1 tablet by mouth 3 times daily 9/20/18   Stephani Vega MD   hydrALAZINE (APRESOLINE) 25 MG tablet Take 1 tablet by mouth every 8 hours 9/20/18   Stephani Vega MD   Blood Pressure KIT 1 Device by Does not apply route 2 times daily 9/20/18   Joseph Garcia MD   atorvastatin (LIPITOR) 40 MG tablet Take 40 mg by mouth nightly    Historical Provider, MD   ferrous sulfate 325 (65 Fe) MG EC tablet Take 325 g by mouth 2 times daily (with meals) 4/20/18   Historical Provider, MD   gabapentin (NEURONTIN) 600 MG tablet Take 400 mg by mouth 3 times daily. Take 2 cap three times a day    Historical Provider, MD   loratadine (CLARITIN) 10 MG tablet Take 10 mg by mouth daily    Historical Provider, MD   tiotropium (SPIRIVA) 18 MCG inhalation capsule Inhale 1 capsule into the lungs  Patient not taking: Reported on 3/18/2022 8/31/17   Historical Provider, MD   sertraline (ZOLOFT) 100 MG tablet Take 100 mg by mouth daily    Historical Provider, MD   atorvastatin (LIPITOR) 40 MG tablet Take 1 tablet by mouth nightly 2/21/18   Louis Johnson MD   albuterol sulfate  (90 Base) MCG/ACT inhaler Inhale 2 puffs into the lungs every 6 hours as needed for Wheezing    Historical Provider, MD   fluticasone (FLONASE) 50 MCG/ACT nasal spray 1 spray by Nasal route daily  Patient not taking: Reported on 3/18/2022    Historical Provider, MD   albuterol (PROVENTIL) (2.5 MG/3ML) 0.083% nebulizer solution Take 3 mLs by nebulization every 6 hours as needed for Wheezing. Patient not taking: Reported on 3/18/2022 9/24/14   Juan M Singh MD   budesonide-formoterol [facility]) 160-4.5 MCG/ACT AERO Inhale 2 puffs into the lungs 2 times daily. Patient not taking: Reported on 3/18/2022 9/24/14   Renate Ovalle MD       SOCIAL HISTORY     Tobacco: Patient is a former smoker. Patient smokes 1 pack/day for the past 22 years. Pack year is 22  Alcohol: Patient does not use alcohol  Illicits:  patient does not use drugs.   Occupation: Not on file.    FAMILY HISTORY     Family History   Problem Relation Age of Onset    Emphysema Mother    [facility] Alzheimer's Disease Mother     Other Mother         Blood disorder    High Blood Pressure Father    [facility] Arthritis Father     Diabetes Sister     Heart Failure Sister     Kidney Disease Sister     High Blood Pressure Brother     Dementia Maternal Grandmother     High Blood Pressure Maternal Grandmother     Dementia Maternal Grandfather     High Blood Pressure Maternal Grandfather     Cancer Paternal Grandmother     Heart Disease Paternal Grandfather     Diabetes Sister     Heart Failure Sister     Other Sister         Intestinal problems    No Known Problems Sister     No Known Problems Son        PHYSICAL EXAM     Vitals: /68   Pulse 90   Temp 99.1 °F (37.3 °C) (Axillary)   Resp 17   Ht 5' 6\" (1.676 m)   Wt (!) 354 lb 8 oz (160.8 kg)   SpO2 90%   BMI 57.22 kg/m²   Tmax: Temp (24hrs), Av.5 °F (36.9 °C), Min:97.9 °F (36.6 °C), Max:99.1 °F (37.3 °C)    Last Body weight:   Wt Readings from Last 3 Encounters:   22 (!) 354 lb 8 oz (160.8 kg)   20 (!) 382 lb 9.6 oz (173.5 kg)   20 (!) 387 lb 12.8 oz (175.9 kg)     Body Mass Index : Body mass index is 57.22 kg/m². PHYSICAL EXAMINATION:  Constitutional: This is a well developed, well nourished, Greater than 40 - Morbid Obesity / Extreme Obesity / Grade III 55y.o. year old female who is alert, oriented, cooperative and in no apparent distress. Head:normocephalic and atraumatic. EENT:  PERRLA. No conjunctival injections. Septum was midline, mucosa was without erythema, exudates or cobblestoning. No thrush was noted. Neck: Supple without thyromegaly. No elevated JVP. Trachea was midline. Respiratory: Breath sounds bilaterally were clear to auscultation. There were no wheezes, rhonchi or rales. There is no intercostal retraction or use of accessory muscles. No egophony noted.    Cardiovascular: Regular without murmur, clicks, gallops or rubs. Abdomen: Slightly rounded and soft without organomegaly. No rebound, rigidity or guarding was appreciated. Extremities: Bilateral pitting pedal edema present. Skin:  Warm and dry. Good color, turgor and pigmentation. No lesions or scars.   No cyanosis or clubbing  Neurological/Psychiatric: The patient's general behavior, level of consciousness, thought content and emotional status is normal.          INVESTIGATIONS     Laboratory Testing:     Recent Results (from the past 24 hour(s))   CBC with Auto Differential    Collection Time: 03/18/22 11:31 AM   Result Value Ref Range    WBC 11.5 (H) 3.5 - 11.3 k/uL    RBC 4.16 3.95 - 5.11 m/uL    Hemoglobin 10.5 (L) 11.9 - 15.1 g/dL    Hematocrit 37.2 36.3 - 47.1 %    MCV 89.4 82.6 - 102.9 fL    MCH 25.2 25.2 - 33.5 pg    MCHC 28.2 (L) 28.4 - 34.8 g/dL    RDW 14.8 (H) 11.8 - 14.4 %    Platelets See Reflexed IPF Result 138 - 453 k/uL    NRBC Automated 0.3 (H) 0.0 per 100 WBC    RBC Morphology ANISOCYTOSIS PRESENT     Immature Granulocytes 2 (H) 0 %    Seg Neutrophils 78 (H) 36 - 66 %    Lymphocytes 16 (L) 24 - 44 %    Monocytes 3 1 - 7 %    Eosinophils % 1 1 - 4 %    Basophils 0 0 - 2 %    Absolute Immature Granulocyte 0.23 0.00 - 0.30 k/uL    Segs Absolute 8.96 (H) 1.8 - 7.7 k/uL    Absolute Lymph # 1.84 1.0 - 4.8 k/uL    Absolute Mono # 0.35 0.1 - 0.8 k/uL    Absolute Eos # 0.12 0.0 - 0.4 k/uL    Basophils Absolute 0.00 0.0 - 0.2 k/uL    Morphology ANISOCYTOSIS PRESENT     Morphology 1+ POLYCHROMASIA    Basic Metabolic Panel w/ Reflex to MG    Collection Time: 03/18/22 11:31 AM   Result Value Ref Range    Glucose 164 (H) 70 - 99 mg/dL    BUN 18 6 - 20 mg/dL    CREATININE 0.99 (H) 0.50 - 0.90 mg/dL    Calcium 9.7 8.6 - 10.4 mg/dL    Sodium 138 135 - 144 mmol/L    Potassium 3.9 3.7 - 5.3 mmol/L    Chloride 90 (L) 98 - 107 mmol/L    CO2 31 20 - 31 mmol/L    Anion Gap 17 9 - 17 mmol/L    GFR Non-African American >60 >60 mL/min    GFR  >60 >60 mL/min    GFR Comment         Troponin    Collection Time: 03/18/22 11:31 AM   Result Value Ref Range    Troponin, High Sensitivity 8 0 - 14 ng/L   Brain Natriuretic Peptide    Collection Time: 03/18/22 11:31 AM   Result Value Ref Range    Pro-BNP 1,086 (H) <300 pg/mL   D-Dimer, Quantitative    Collection Time: 03/18/22 11:31 AM   Result Value Ref Range    D-Dimer, Quant 0.79 mg/L FEU   HCG Qualitative, Serum    Collection Time: 03/18/22 11:31 AM   Result Value Ref Range    hCG Qual NEGATIVE NEGATIVE   Immature Platelet Fraction    Collection Time: 03/18/22 11:31 AM   Result Value Ref Range    Platelet, Immature Fraction 6.1 1.1 - 10.3 %    Platelet, Fluorescence 289 138 - 453 k/uL   Troponin    Collection Time: 03/18/22  2:03 PM   Result Value Ref Range    Troponin, High Sensitivity 10 0 - 14 ng/L   BLOOD GAS, VENOUS    Collection Time: 03/18/22  2:03 PM   Result Value Ref Range    pH, Zaid 7.262 (L) 7.320 - 7.420    pCO2, Zaid 85.0 (H) 39 - 55    pO2, Zaid 40.6 30 - 50    HCO3, Venous 37.0 (H) 24 - 30 mmol/L    Positive Base Excess, Zaid 8.1 (H) 0.0 - 2.0 mmol/L    O2 Sat, Zaid 69.3 60.0 - 85.0 %    Carboxyhemoglobin 1.3 0 - 5 %    Pt Temp 37.0     FIO2 INFORMATION NOT PROVIDED    COVID-19, Rapid    Collection Time: 03/18/22  2:04 PM    Specimen: Nasopharyngeal Swab   Result Value Ref Range    Specimen Description . NASOPHARYNGEAL SWAB     SARS-CoV-2, Rapid Not Detected Not Detected   POC Glucose Fingerstick    Collection Time: 03/18/22  9:52 PM   Result Value Ref Range    POC Glucose 104 65 - 105 mg/dL       Imaging:   XR CHEST PORTABLE    Result Date: 3/18/2022  Stable cardiomegaly with mild pulmonary vascular congestion. CT CHEST PULMONARY EMBOLISM W CONTRAST    Result Date: 3/18/2022  No evidence of pulmonary embolism.   There is patchy ill-defined opacification lower lung fields bilaterally suggesting infiltrate with ground-glass opacity concerning for pneumonia as well in the upper lung fields. Reactive adenopathy throughout the mediastinum and hilar regions. RECOMMENDATIONS: Unavailable       ASSESSMENT & PLAN     ASSESSMENT / PLAN:     IMPRESSION    1. Pneumonia: Patient is saturating with SPO2 96 on high flow nasal cannula with FiO2 90 flow rate 30 L/min. Patient on levofloxacin 750 mg intravenous every 24 hours. 2. Hypertension: Patient on Norvasc 5 mg daily. 3. Hyperlipidemia patient on Lipitor 40 mg daily. 4. Congestive heart failure-patient on Isordil and Aldactone. 5. COPD-patient on albuterol inhaler every 6 hours as needed. Patient on Symbicort inhaler twice daily. Patient on Spiriva inhaler daily. DVT ppx: Lovenox  GI ppx: Protonix    PT/OT/SW consulted  Discharge Planning: Aj Benítez MD  Internal Medicine Resident, PGY-1  [facility];  [place], [place]  3/18/2022, 10:38 PM

## 2022-03-19 NOTE — PROGRESS NOTES
Physical Therapy Cancel Note      DATE: 3/19/2022    NAME: Gilda Scruggs  MRN: [id number]   : 1975      Patient not seen this date for Physical Therapy due to:     Other: Hold per RN; pt had bad night and continues with low O2 levels      Electronically signed by Juan Diamond PT , DPT, CMPT  on 3/19/2022 at 9:41 AM

## 2022-03-20 NOTE — CONSULTS
PULMONARY & CRITICAL CARE MEDICINE CONSULT NOTE     Patient:  Shannan Liu  MRN: [id number]  Admit date: 3/18/2022  Primary Care Physician: Alexandru Hart DO  Consulting Physician: Johnson Galloway MD    HISTORY     CHIEF COMPLAINT/REASON FOR CONSULT:    Acute on chronic hypoxic hypercapnic respiratory failure,  Pneumonia, COPD CHF exacerbation  Concern for Covid pneumonia    HISTORY OF PRESENT ILLNESS:  The patient is a 55 y.o. female presents with complaint of shortness of breath, associated with stuffy nose, productive cough, clear sputum. Patient desatted to high 60s and low 70s. According to EMS, patient's oxygen tubing was kinked and was saturating at 70%. Improved with new tubing. She was placed on nonrebreather. Patient also complained of occasional substernal chest pain pressure-like sensation radiating to back at the time of presentation. On presentation, patient afebrile hemodynamically stable saturating at 96% on high flow 90% FiO2 30 L/minute    Pertinent labs: proBNP 1086  WBC 11.5  Procalcitonin 0.25  Negative for COVID-19 testing    VBG: pH 7.26/PCO2 85/PO2 40.6/bicarb 37. Acute on chronic hypercapnic respiratory acidosis with appropriate metabolic compensation    Chest x-ray: Patchy airspace opacity right worse than left. CT PE: Negative for PE. Groundglass opacity present. Reactive adenopathy throughout mediastinum and hilar region. Patchy consolidation right lower lobe.     PAST MEDICAL HISTORY:        Diagnosis Date    Acute on chronic diastolic CHF (congestive heart failure) ([address] 9/15/2018    HIEN (acute kidney injury) ([address] 5/6/2019    HIEN (acute kidney injury) ([address] 11/20/2018    Asthma     CHF     Chronic obstructive lung disease (HCC)     Chronic respiratory failure with hypoxia (HCC)     on home O2 therapy    COPD     Diabetes mellitus, new onset ([address] 5/6/2019    Former smoker     quit smoking about 17 months ago/ She has a 22.00 pack-year smoking history    HTN  Hyperglycemia     Hyperlipidemia     Hypertension     Morbid obesity with BMI of 60.0-69.9, adult ([address]     Neuromuscular disorder ([address]     Neuropathy Right hand    STEPH on CPAP     Pedal edema     Pneumonia     Pulmonary hypertension, moderate to severe ([address] 06/09/2018    Torn meniscus     Wears glasses      PAST SURGICAL HISTORY:        Procedure Laterality Date    ABDOMEN SURGERY      Patient had a PEG tube placed 1/2018, removed 4/2018    [address]    CYSTOSCOPY  June 5, 2019    CYSTOSCOPY N/A 6/5/2019    CYSTOSCOPY performed by Grant [facility] at [facility]  02/2018    Removed in April 2018    [facility]. CATH POWER PICC TRIPLE  2/2/2018         JOINT REPLACEMENT      CO OFFICE/OUTPT VISIT,PROCEDURE ONLY N/A 2/17/2018    TRACHEOTOMY performed by Jodie Sloan MD at [address]  03/2018    TRACHEOTOMY  02/2018     FAMILY HISTORY:       Problem Relation Age of Onset    Emphysema Mother     Alzheimer's Disease Mother     Other Mother         Blood disorder    High Blood Pressure Father     Arthritis Father     Diabetes Sister     Heart Failure Sister     Kidney Disease Sister     High Blood Pressure Brother     Dementia Maternal Grandmother     High Blood Pressure Maternal Grandmother     Dementia Maternal Grandfather     High Blood Pressure Maternal Grandfather     Cancer Paternal Grandmother     Heart Disease Paternal Grandfather     Diabetes Sister     Heart Failure Sister     Other Sister         Intestinal problems    No Known Problems Sister     No Known Problems Son      SOCIAL HISTORY:   TOBACCO:   reports that she quit smoking about 4 years ago. Her smoking use included cigarettes. She started smoking about 27 years ago. She has a 22.00 pack-year smoking history. She has never used smokeless tobacco.  ETOH:  reports no history of alcohol use. DRUGS: reports no history of drug use.   AVOCATION/OCCUPATIONAL EXPOSURE:    Denies    ALLERGIES:    Allergies   Allergen Reactions    Bee Venom Anaphylaxis     Last bee sting when patient was at 11years of age   Xiomy Me Sulfa Antibiotics Anaphylaxis    Asa [Aspirin]     Aspirin Other (See Comments)     HEART PALPATATIONS      Beta Adrenergic Blockers Other (See Comments)     Asystole and Bradycardia on admission  01/26/2018-2/22/2018 at [address]    Beta Adrenergic Blockers Other (See Comments)     UNKNOWN      Sulfa Antibiotics          HOME MEDICATIONS:  Prior to Admission medications    Medication Sig Start Date End Date Taking? Authorizing Provider   diazePAM (VALIUM) 5 MG tablet Take 5 mg by mouth every 12 hours as needed for Anxiety.     Historical Provider, MD   folic acid (FOLVITE) 1 MG tablet Take 1 mg by mouth daily    Historical Provider, MD   magnesium oxide (MAG-OX) 400 MG tablet Take 400 mg by mouth 2 times daily  Patient not taking: Reported on 3/18/2022    Historical Provider, MD   vitamin B-12 (CYANOCOBALAMIN) 1000 MCG tablet Take 1,000 mcg by mouth daily    Historical Provider, MD   Dulaglutide (TRULICITY) 6.60 CN/1.6FT SOPN Inject 0.75 mg into the skin once a week    Historical Provider, MD   metOLazone (ZAROXOLYN) 2.5 MG tablet Take 2.5 mg by mouth every other day    Historical Provider, MD   glipiZIDE (GLUCOTROL XL) 2.5 MG extended release tablet Take 2.5 mg by mouth    Historical Provider, MD   cyclobenzaprine (FLEXERIL) 5 MG tablet  10/15/19   Historical Provider, MD   gabapentin (NEURONTIN) 400 MG capsule  10/23/19   Historical Provider, MD   SM MUCUS RELIEF 600 MG extended release tablet  10/15/19   Historical Provider, MD   nystatin (MYCOSTATIN) 731299 UNIT/GM cream  10/17/19   Historical Provider, MD   sertraline (ZOLOFT) 100 MG tablet sertraline 100 mg tablet    Historical Provider, MD   amLODIPine (NORVASC) 10 MG tablet  10/15/19   Historical Provider, MD   Ergocalciferol (VITAMIN D2 PO) Take 50,000 Units by mouth once a week  Patient not taking: Reported on 3/18/2022    Historical Provider, MD   traZODone (DESYREL) 50 MG tablet TAKE 1 TABLET AT NIGHT  Patient not taking: Reported on 3/18/2022 9/21/19   Kandis Carrera MD   potassium chloride (KLOR-CON M) 10 MEQ extended release tablet Take 1 tablet by mouth daily 7/25/19   Maryuri Bray MD   bumetanide (BUMEX) 1 MG tablet Take 2 mg by mouth 2 times daily    Historical Provider, MD   JANUVIA 50 MG tablet Take 50 mg by mouth daily 6/11/19   Historical Provider, MD   LORazepam (ATIVAN) 2 MG tablet Take 2 mg by mouth 2 times daily as needed. Patient not taking: Reported on 3/18/2022 3/29/19   Historical Provider, MD   melatonin 5 MG TABS tablet  5/7/19   Historical Provider, MD   glucose monitoring kit (FREESTYLE) monitoring kit 1 kit by Does not apply route daily 5/8/19   Surekha Dill MD   blood glucose monitor strips Test three  times a day & as needed for symptoms of irregular blood glucose. 5/8/19   Surekha Dill MD   Lancets MISC 1 each by Does not apply route daily 5/8/19   Surekha Dill MD   pantoprazole sodium (PROTONIX) 40 MG PACK packet Take 40 mg by mouth every morning (before breakfast)    Historical Provider, MD   spironolactone (ALDACTONE) 25 MG tablet Take 1 tablet by mouth daily 11/22/18   Olaf Hwang MD   oxyCODONE-acetaminophen (PERCOCET)  MG per tablet Take 1 tablet by mouth every 6 hours as needed for Pain. Malik Rios     Historical Provider, MD   isosorbide dinitrate (ISORDIL) 20 MG tablet Take 1 tablet by mouth 3 times daily 9/20/18   Magi Restrepo MD   hydrALAZINE (APRESOLINE) 25 MG tablet Take 1 tablet by mouth every 8 hours 9/20/18   Magi Restrepo MD   Blood Pressure KIT 1 Device by Does not apply route 2 times daily 9/20/18   Jin Wilson MD   atorvastatin (LIPITOR) 40 MG tablet Take 40 mg by mouth nightly    Historical Provider, MD   ferrous sulfate 325 (65 Fe) MG EC tablet Take 325 g by mouth 2 times daily (with meals) 4/20/18   Historical Provider, MD gabapentin (NEURONTIN) 600 MG tablet Take 400 mg by mouth 3 times daily. Take 2 cap three times a day    Historical Provider, MD   loratadine (CLARITIN) 10 MG tablet Take 10 mg by mouth daily    Historical Provider, MD   tiotropium (SPIRIVA) 18 MCG inhalation capsule Inhale 1 capsule into the lungs  Patient not taking: Reported on 3/18/2022 8/31/17   Historical Provider, MD   sertraline (ZOLOFT) 100 MG tablet Take 100 mg by mouth daily    Historical Provider, MD   atorvastatin (LIPITOR) 40 MG tablet Take 1 tablet by mouth nightly 2/21/18   Heladio House MD   albuterol sulfate  (90 Base) MCG/ACT inhaler Inhale 2 puffs into the lungs every 6 hours as needed for Wheezing    Historical Provider, MD   fluticasone (FLONASE) 50 MCG/ACT nasal spray 1 spray by Nasal route daily  Patient not taking: Reported on 3/18/2022    Historical Provider, MD   albuterol (PROVENTIL) (2.5 MG/3ML) 0.083% nebulizer solution Take 3 mLs by nebulization every 6 hours as needed for Wheezing. Patient not taking: Reported on 3/18/2022 9/24/14   Yunior Waterman MD   budesonide-formoterol [facility]) 160-4.5 MCG/ACT AERO Inhale 2 puffs into the lungs 2 times daily. Patient not taking: Reported on 3/18/2022 9/24/14   Yifan Navarro MD     IMMUNIZATIONS:    There is no immunization history on file for this patient.     REVIEW OF SYSTEMS:  General: negative for chills, fatigue or fever  ENT: negative for headaches, nasal congestion, sore throat or visual changes  Allergy and Immunology: negative for postnasal drip or seasonal allergies  Hematological and Lymphatic: negative for bleeding problems, swollen lymph nodes  Respiratory: Positive for shortness of breath, cough and chest pain  Cardiovascular: negative for edema or palpitations  Gastrointestinal: negative for abdominal pain, change in bowel habits or nausea/vomiting  Genito-Urinary: negative for dysuria or urinary frequency/urgency  Musculoskeletal: negative for joint pain or joint swelling  Neurological: negative for numbness/tingling, seizures or weakness  Dermatological: negative for pruritus or rash    PHYSICAL EXAMINATION     VITAL SIGNS:   LAST-  BP (!) 142/74   Pulse 94   Temp 98.4 °F (36.9 °C) (Oral)   Resp 18   Ht 5' 6\" (1.676 m)   Wt (!) 355 lb (161 kg)   SpO2 91%   BMI 57.30 kg/m²   8-24 HR RANGE-  TEMP Temp  Av.4 °F (36.9 °C)  Min: 98.2 °F (36.8 °C)  Max: 98.7 °F (55.6 °C)   BP Systolic (24KLX), XWZ:263 , Min:123 , ZNI:444      Diastolic (40WJY), WMI:53, Min:63, Max:82     PULSE Pulse  Av  Min: 82  Max: 94   RR Resp  Av  Min: 18  Max: 18   O2 SAT SpO2  Av.3 %  Min: 90 %  Max: 94 %   OXYGEN DELIVERY O2 Flow Rate (L/min)  Av L/min  Min: 25 L/min  Max: 25 L/min     SYSTEMIC EXAMINATION:    General appearance - well appearing, overweight, comfortable and in no acute distress   Mental status - alert, oriented to person, place, and time   Eyes - pupils equal and reactive, extraocular eye movements intact, sclera anicteric   Ears - not examined   Nose - normal and patent, no erythema, discharge   Mouth - mucous membranes moist, pharynx normal without lesions   Neck - supple, no significant adenopathy, carotids upstroke normal bilaterally, no bruits   Lymphatics - no palpable lymphadenopathy, no hepatosplenomegaly   Chest -bilateral decreased breath sounds   Heart - normal rate, regular rhythm, normal S1, S2, no murmurs, rubs, clicks or gallops   Abdomen - soft, nontender, nondistended, no masses or organomegaly   Neurological - motor and sensory grossly normal bilaterally   Musculoskeletal - no joint tenderness, deformity or swelling   Extremities - peripheral pulses normal, no pedal edema, no clubbing or cyanosis   Skin - normal coloration and turgor, no rashes, no suspicious skin lesions noted    DATA REVIEW     Medications: Current Inpatient  Scheduled Meds:   levofloxacin  750 mg IntraVENous Q24H    hydrALAZINE  25 mg Oral 3 times per day    bumetanide  2 mg IntraVENous Once    bumetanide  2 mg Oral BID    sodium chloride flush  5-40 mL IntraVENous 2 times per day    enoxaparin  30 mg SubCUTAneous BID    amLODIPine  5 mg Oral Daily    atorvastatin  40 mg Oral Nightly    budesonide-formoterol  2 puff Inhalation BID    fluticasone  1 spray Nasal Daily    folic acid  1 mg Oral Daily    gabapentin  300 mg Oral TID    isosorbide dinitrate  20 mg Oral TID    pantoprazole  40 mg Oral QAM AC    sertraline  100 mg Oral Daily    spironolactone  25 mg Oral Daily    tiotropium  2 puff Inhalation Daily    guaiFENesin  600 mg Oral BID    docusate sodium  100 mg Oral Daily     Continuous Infusions:   dextrose      sodium chloride 25 mL (03/20/22 0840)     INPUT/OUTPUT:  In: -   Out: 800 [Urine:800]    LABS:-  ABGs:   No results found for: PH, PCO2, PO2, HCO3, O2SAT  No results for input(s): PHART, PO2ART, JDZ9OMO, DZP4ZCT, BEART, F6RRUFBS in the last 72 hours. Lab Results   Component Value Date    POCPH 7.458 (H) 02/22/2019    POCPCO2 54.1 (H) 02/22/2019    POCPO2 154.8 (H) 02/22/2019    POCHCO3 38.3 (H) 02/22/2019    YBIS2EYP 99 (H) 02/22/2019     CBC:   Recent Labs     03/18/22  1131 03/19/22  0637 03/20/22  0626   WBC 11.5* 8.5 8.0   HGB 10.5* 9.6* 8.9*   HCT 37.2 35.7* 32.7*   MCV 89.4 91.1 92.6   PLT See Reflexed IPF Result 298 262   LYMPHOPCT 16* 15* 15*   RBC 4.16 3.92* 3.53*   MCH 25.2 24.5* 25.2   MCHC 28.2* 26.9* 27.2*   RDW 14.8* 14.8* 15.0*     BMP:   Recent Labs     03/18/22  1131 03/19/22  0637 03/20/22  0626    141 138   K 3.9 3.5* 3.4*   CL 90* 90* 91*   CO2 31 38* 37*   BUN 18 16 14   CREATININE 0.99* 1.06* 0.82   GLUCOSE 164* 109* 112*     Liver Function Test:   No results for input(s): PROT, LABALBU, ALT, AST, GGT, ALKPHOS, BILITOT in the last 72 hours. Amylase/Lipase:  No results for input(s): AMYLASE, LIPASE in the last 72 hours.   Coagulation Profile:   No results for input(s): INR, PROTIME, APTT in the last 72 hours. Cardiac Enzymes:  No results for input(s): CKTOTAL, CKMB, CKMBINDEX, TROPONINI in the last 72 hours. Lactic Acid:  No results found for: LACTA  BNP:   No results found for: BNP  D-Dimer:  Lab Results   Component Value Date    DDIMER 0.86 03/19/2022     Others:   Lab Results   Component Value Date    TSH 2.36 02/23/2019     Lab Results   Component Value Date    LAUREN NEGATIVE 05/07/2019    CRP 8.0 (H) 06/09/2018     No results found for: Malen Cake  Lab Results   Component Value Date    IRON 34 (L) 04/23/2020    TIBC 235 (L) 04/23/2020    FERRITIN 275 (H) 04/23/2020     No results found for: SPEP, UPEP  No results found for: PSA, CEA, , PG2736,   Microbiology:    Pathology:    Radiology:  XR CHEST PORTABLE    Result Date: 3/19/2022  EXAMINATION: ONE XRAY VIEW OF THE CHEST 3/19/2022 12:51 am COMPARISON: CT PA performed approximately 10 hours earlier. Portable chest obtained approximately 12 hours earlier. HISTORY: ORDERING SYSTEM PROVIDED HISTORY: Increasing O2 requirment TECHNOLOGIST PROVIDED HISTORY: Increasing O2 requirment Reason for Exam: shortness of breath, chest pain off and on   upright port FINDINGS: Mild cardiomegaly and normal pulmonary vasculature. Right worse than left bibasilar patchy airspace opacities which appear worse than exam 12 hours earlier. No pneumothorax or pleural effusion. Surrounding structures are unremarkable. Findings suggestive of worsening bibasilar pneumonia. XR CHEST PORTABLE    Result Date: 3/18/2022  EXAMINATION: ONE XRAY VIEW OF THE CHEST 3/18/2022 11:16 am COMPARISON: Chest x-ray dated 29 June 2021 HISTORY: ORDERING SYSTEM PROVIDED HISTORY: sob TECHNOLOGIST PROVIDED HISTORY: sob FINDINGS: Mild stable cardiomegaly with pulmonary vascular congestion. No pneumothorax or pleural effusion. Stable cardiomegaly with mild pulmonary vascular congestion.      CT CHEST PULMONARY EMBOLISM W CONTRAST    Result Date: 3/18/2022  EXAMINATION: CTA OF THE CHEST 3/18/2022 1:23 pm TECHNIQUE: CTA of the chest was performed after the administration of intravenous contrast.  Multiplanar reformatted images are provided for review. MIP images are provided for review. Dose modulation, iterative reconstruction, and/or weight based adjustment of the mA/kV was utilized to reduce the radiation dose to as low as reasonably achievable. COMPARISON: None HISTORY: ORDERING SYSTEM PROVIDED HISTORY: sedentary lifestyle, leg swelling, increased O2 TECHNOLOGIST PROVIDED HISTORY: sedentary lifestyle, leg swelling, increased O2 Decision Support Exception - unselect if not a suspected or confirmed emergency medical condition->Emergency Medical Condition (MA) Reason for Exam: sedentary lifestyle, leg swelling, increased O2 FINDINGS: Pulmonary Arteries: Pulmonary arteries are adequately opacified for evaluation. No evidence of intraluminal filling defect to suggest pulmonary embolism. Main pulmonary artery is normal in caliber. Mediastinum: No evidence of mediastinal lymphadenopathy. Small reactive lymph nodes seen in the mediastinum and hilar regions. The heart and pericardium demonstrate no acute abnormality. There is no acute abnormality of the thoracic aorta. Lungs/pleura: Patchy ground-glass opacity and increased markings seen in the lower lung fields bilaterally concerning for bibasilar infiltrate. No pleural effusion or pneumothorax. No pulmonary mass or nodule. Patchy ground-glass opacity in the upper lung fields bilaterally. Upper Abdomen: Limited images of the upper abdomen are unremarkable. Soft Tissues/Bones: No acute bone or soft tissue abnormality. Degenerative changes seen within the spine with no chest wall abnormality. No evidence of pulmonary embolism. There is patchy ill-defined opacification lower lung fields bilaterally suggesting infiltrate with ground-glass opacity concerning for pneumonia as well in the upper lung fields.   Reactive adenopathy throughout the mediastinum and hilar regions. RECOMMENDATIONS: Unavailable     VL DUP LOWER EXTREMITY VENOUS BILATERAL    Result Date: 3/19/2022    [facility]  Vascular Lower Extremities DVT Study Procedure   Patient Name  CARMEN       Date of Study           03/18/2022                [address]   Date of Birth 1975  Gender                  Female   Age           55 year(s)  Race                    Black   Room Number   2008        Height:                 65.98 inch, 167.6 cm   Corporate ID  [id number]    Weight:                 380 pounds, 172.4 kg  #   Patient Acct  [de-identified]   BSA:        2.63 m^2    BMI:       61.36 kg/m^2  #   MR #          [id number]     Sonographer             Haily Agostoantonio   Accession #   [id number]  Interpreting Physician  Solitario Brandt   Referring                 Referring Physician     Sunshine Amos. Isabel Tong DO  Nurse  Practitioner  Procedure Type of Study:   Veins: Lower Extremities DVT Study, Venous Scan Lower Bilateral.  Indications for Study:Leg Swelling and Shortness of breath. Patient Status:ER. Technical Quality:Limited visualization. Limitation reason:bedside exam , body habitus, deep vessels, edema. Conclusions   Summary   No evidence of superficial or deep venous thrombosis in both lower  extremities.    Signature   ----------------------------------------------------------------  Electronically signed by NIKKI Suarez(Sonographer) on  03/18/2022 01:27 PM  ----------------------------------------------------------------   ----------------------------------------------------------------  Electronically signed by Genene Cota Reyes,Arthur(Interpreting  physician) on 03/19/2022 07:28 AM  ----------------------------------------------------------------  Findings:   Right Impression:                    Left Impression:  The common femoral, femoral,         The common femoral, femoral,  popliteal and tibial veins           popliteal and tibial veins  demonstrate normal !Yes       !Yes            ! None      ! +------------------------------------+----------+---------------+----------+ Right Doppler Measurements +--------------------------+---------+------+------------------------------+ ! Location                  ! Signal   !Reflux! Reflux (msec)                 ! +--------------------------+---------+------+------------------------------+ ! Common Femoral            !Pulsatile!      !                              ! +--------------------------+---------+------+------------------------------+ ! Prox Femoral              !Pulsatile!      !                              ! +--------------------------+---------+------+------------------------------+ ! Popliteal                 !Pulsatile!      !                              ! +--------------------------+---------+------+------------------------------+ Left Lower Extremities DVT Study Measurements Left 2D Measurements +------------------------------------+----------+---------------+----------+ ! Location                            ! Visualized! Compressibility! Thrombosis! +------------------------------------+----------+---------------+----------+ ! Common Femoral                      !Yes       ! Yes            ! None      ! +------------------------------------+----------+---------------+----------+ ! Prox Femoral                        !Yes       ! Yes            ! None      ! +------------------------------------+----------+---------------+----------+ ! Mid Femoral                         !Partial   !Yes            ! None      ! +------------------------------------+----------+---------------+----------+ ! Dist Femoral                        !Partial   !Yes            ! None      ! +------------------------------------+----------+---------------+----------+ ! Popliteal                           !Yes       ! Yes            ! None      ! +------------------------------------+----------+---------------+----------+ ! Sapheno Femoral Junction !Yes       !Yes            ! None      ! +------------------------------------+----------+---------------+----------+ ! PTV                                 ! Partial   !Yes            ! None      ! +------------------------------------+----------+---------------+----------+ ! Peroneal                            !Partial   !Yes            ! None      ! +------------------------------------+----------+---------------+----------+ ! Gastroc                             ! Yes       ! Yes            ! None      ! +------------------------------------+----------+---------------+----------+ ! GSV Thigh                           ! Yes       ! Yes            ! None      ! +------------------------------------+----------+---------------+----------+ ! GSV Knee                            ! Yes       ! Yes            ! None      ! +------------------------------------+----------+---------------+----------+ ! GSV Ankle                           ! Yes       ! Yes            ! None      ! +------------------------------------+----------+---------------+----------+ ! SSV                                 ! Yes       ! Yes            ! None      ! +------------------------------------+----------+---------------+----------+ Left Doppler Measurements +--------------------------+---------+------+------------------------------+ ! Location                  ! Signal   !Reflux! Reflux (msec)                 ! +--------------------------+---------+------+------------------------------+ ! Common Femoral            !Pulsatile!      !                              ! +--------------------------+---------+------+------------------------------+ ! Prox Femoral              !Pulsatile!      !                              ! +--------------------------+---------+------+------------------------------+ ! Popliteal                 !Pulsatile!      !                              ! +--------------------------+---------+------+------------------------------+    FL MODIFIED BARIUM SWALLOW W VIDEO    Result Date: 3/19/2022  EXAMINATION: MODIFIED BARIUM SWALLOW WAS PERFORMED IN CONJUNCTION WITH SPEECH PATHOLOGY SERVICES TECHNIQUE: Fluoroscopic evaluation of the swallowing mechanism was performed using cineradiography with multiple consistency of barium product in conjunction with speech pathology services. FLUOROSCOPY DOSE AND TYPE OR TIME AND EXPOSURES: Fluoro time: 1.1 minute DAP: 22.570 mGy COMPARISON: None HISTORY: ORDERING SYSTEM PROVIDED HISTORY: h/o esophageal stricture per patient TECHNOLOGIST PROVIDED HISTORY: h/o esophageal stricture per patient 51-year-old female with history of esophageal stricture FINDINGS: No penetration or aspiration with the thin liquid and thick liquid substance by straw, pureed/pudding thick, soft solid and cookie solid substances. No penetration or aspiration with the above administered substances. Please see separate speech pathology report for full discussion of findings and recommendations.        Pulmonary Function test:    Polysomnogram:    Echocardiogram:   Results for orders placed during the hospital encounter of 02/22/19    ECHO Complete 2D W Doppler W Color    Narrative  Transthoracic Echocardiography Report (TTE)    Patient Name PRICE       Date of Study               02/25/2019  Azam Mckay    Date of      1975  Gender                      Female  Birth    Age          37 year(s)  Race                        Black    Room Number  3001        Height:                     60 inch, 152.4 cm    Corporate ID [id number]  Weight:                     412 pounds, 186.9 kg  #    Patient Acct [de-identified]   BSA:          2.54 m^2      BMI:      80.46  #                                                              kg/m^2    MR #         [id number]     Serafin Barcenasgua    Accession #  [id number]   Interpreting Physician      [facility]    Fellow                   Referring Nurse  Practitioner    Interpreting             Referring Physician         Sisi Loving,  Shea    Type of Study    TTE procedure:2D Echocardiogram, M-Mode, Doppler, Color Doppler. Procedure Date  Date: 02/25/2019 Start: 07:44 AM    Study Location: [facility]  Technical Quality: Poor visualization due to body habitus. History / Tech. Comments:  Procedure explained to patient. Very technically difficult study due to body  habitus. 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    - Sulfa. CONCLUSIONS    Summary  Technically difficult study. Left ventricle is normal in size. Mild concentric LVH Global left  ventricular systolic function appears hyperdynamic Calculated ejection  fraction is 75% . Mild left ventricular hypertrophy. Moderately dilated right ventricle size and severely decreased systolic  function. Estimated right ventricular systolic pressure is 44 mmHg. Moderate pulmonary hypertension. mildly elevated RA filling pressure . Signature  ----------------------------------------------------------------------------  Electronically signed by Isiah Marie(Interpreting physician) on  02/25/2019 03:15 PM  ----------------------------------------------------------------------------    ----------------------------------------------------------------------------  Electronically signed by Bambi Ray(Sonographer) on 02/25/2019  09:28 AM  ----------------------------------------------------------------------------  FINDINGS  Left Atrium  Left atrium is normal in size. Left Ventricle  Technically difficult study. Left ventricle is normal in size. Mld  concentric LVH Global left ventricular systolic function appears  hyperdynamic Calculated ejection fraction is 75% . Mild left ventricular hypertrophy. Right Atrium  Normal right atrial dimension.   Right Ventricle  Moderately dilated right ventricle size and severely decreased systolic  function. Mitral Valve  Normal mitral valve structure. Trivial mitral regurgitation. No mitral stenosis. Aortic Valve  Aortic valve structure and function normal.  Aortic valve is trileaflet. No aortic insufficiency. No aortic stenosis. Tricuspid Valve  Normal tricuspid valve leaflets. Trivial tricuspid regurgitation. No tricuspid stenosis. Estimated right ventricular systolic pressure is 44 mmHg. Moderate pulmonary hypertension. Pulmonic Valve  Pulmonic valve is grossly normal in structure and function. No pulmonic insufficiency seen  Pericardial Effusion  No pericardial effusion seen. Miscellaneous  Normal aortic root dimension. E/E'' = 10.75. IVC normal diameter & with impaired inspiratory collapse indicating mildly  elevated RA filling pressure . Subcostal views are not well visualized.     M-mode / 2D Measurements & Calculations:    LVIDd:4.6 cm(3.7 - 5.6 cm)       Diastolic PDPXAS:36.8 ml  WTYED:4.3 cm(2.2 - 4.0 cm)       Systolic LUNMSO:58.1 ml  ABVU:9.6 cm(0.6 - 1.1 cm)        Aortic Root:2.5 cm(2.0 - 3.7 cm)  LVPWd:1.2 cm(0.6 - 1.1 cm)       LA Dimension: 3.5 cm(1.9 - 4.0 cm)  Fractional Shortenin.96 %    LA volume/Index: 67.93 ml /27m^2  Calculated LVEF (%): 74.92 %     LVOT:1.8 cm  RVDd:4 cm    Mitral:                                 Aortic    Valve Area (P1/2-Time): 2.16 cm^2       Peak Velocity: 1.84 m/s  Peak E-Wave: 0.99 m/s                   Mean Velocity: 1.24 m/s  Peak A-Wave: 0.68 m/s                   Peak Gradient: 13.54 mmHg  E/A Ratio: 1.44                         Mean Gradient: 7 mmHg  Peak Gradient: 3.88 mmHg  Mean Gradient: 2 mmHg  Deceleration Time: 310 msec             Area (continuity): 1.71 cm^2  P1/2t: 102 msec                         AV VTI: 41.7 cm    Area (continuity): 1.91 cm^2  Mean Velocity: 0.58 m/s    Tricuspid:                              Pulmonic:    Estimated RVSP: 44 mmHg                 Peak Velocity: 1.41 m/s  Peak TR Velocity: 2.71 m/s              Peak Gradient: 7.95 mmHg  Peak TR Gradient: 29.3764 mmHg  Estimated RA Pressure: 15 mmHg    Estimated PASP: 44.38 mmHg    Diastology / Tissue Doppler  Septal Wall E' velocity:0.10 m/s  Septal Wall E/E':10.2  Lateral Wall E' velocity:0.09 m/s  Lateral Wall E/E':11.3    No results found for this or any previous visit. Cardiac Catheterization:   No results found for this or any previous visit. ASSESSMENT AND PLAN     Assessment:    Acute on chronic hypoxic hypercapnic respiratory failure  Right lower lobe pneumonia  COPD exacerbation, on 3 L home oxygen  Obesity hypoventilation syndrome  STEPH on CPAP, (noncompliant)  ? Interstitial lung disease  Chronic diastolic CHF, EF 83% (hyperdynamic)  RVSP 44, moderate pulmonary hypertension    Plan:    I personally interviewed/examined the patient; reviewed interval history, interpreted all available radiographic and laboratory data at the time of service. Patient remains hemodynamically stable and is currently saturating well on nasal cannula. Continue supplemental oxygen to keep oxygen saturation greater than 92%  Continue nocturnal and as needed noninvasive mechanical ventilation (BiPAP)  Antibiotic coverage with Levaquin  Continue incentive spirometry, pulmonary toilet, aspiration precautions and bronchodilators (on Symbicort and Spiriva)  Continue to monitor I/O with a goal of even fluid balance, on Bumex 2 mg p.o. twice daily and Aldactone 25 mg p.o. daily  DVT  prophylaxis with Lovenox 30 mg twice daily  Physical/occupational therapy; increase activity as tolerated. Patient will benefit from [facility] for auto BiPAP on discharge. Recommend p.o. prednisone 40 mg daily    I updated the patient regarding the current clinical condition, provisional diagnosis and management plan.  She verbalized a clear understanding and I addressed her concerns, and answered all questions to the best of my abilities. It was my pleasure to evaluate Ama Byers today. We will continue to follow. I would like to thank you for allowing me to participate in the care of this patient. Please feel free to call with any further questions or concerns. Td Hurt MD  PGY-3, Internal Medicine Resident  [address] [facility]  3/20/2022 3:55 PM      Please note that this chart was generated using voice recognition Dragon dictation software. Although every effort was made to ensure the accuracy of this automated transcription, some errors in transcription may have occurred.

## 2022-03-20 NOTE — PLAN OF CARE
BRONCHOSPASM/BRONCHOCONSTRICTION     [x]         IMPROVE AERATION/BREATH SOUNDS  [x]   ADMINISTER BRONCHODILATOR THERAPY AS APPROPRIATE  [x]   ASSESS BREATH SOUNDS  []   IMPLEMENT AEROSOL/MDI PROTOCOL  [x]   PATIENT EDUCATION AS NEEDED   NONINVASIVE VENTILATION    PROVIDE OPTIMAL VENTILATION/ACCEPTABLE SPO2   IMPLEMENT NONINVASIVE VENTILATION PROTOCOL   MAINTAIN ACCEPTABLE SPO2   ASSESS SKIN INTEGRITY/BREAKDOWN SCORE   PATIENT EDUCATION AS NEEDED   BIPAP AS NEEDED       PROVIDE ADEQUATE OXYGENATION WITH ACCEPTABLE SP02/ABG'S    [x]  IDENTIFY APPROPRIATE OXYGEN THERAPY  [x]   MONITOR SP02/ABG'S AS NEEDED   [x]   PATIENT EDUCATION AS NEEDED

## 2022-03-20 NOTE — PROGRESS NOTES
Physical Therapy    Facility/Department: [facility]  Initial Assessment    NAME: Hailey Gonzalez  : 1975  MRN: [id number]    Date of Service: 3/20/2022  Chief Complaint   Patient presents with    Shortness of Breath     pt to er c/o shortness of breath, on home oxygen      Discharge Recommendations:  Patient would benefit from continued therapy after discharge   PT Equipment Recommendations  Equipment Needed: No    Assessment   Body structures, Functions, Activity limitations: Decreased functional mobility ; Decreased ADL status; Decreased strength;Decreased endurance;Decreased balance;Decreased fine motor control  Assessment: Pt presents with baseline weakness, gait and balance deficits with recent decline in respiratory status will benefit from furhter PT as indicated to progress abilty and functional moblitiy  Specific instructions for Next Treatment: encourage sitting at EOB or up to chair as tolerated  Prognosis: Fair  Decision Making: Medium Complexity  Clinical Presentation: evolving  PT Education: Goals;PT Role;Plan of Care;Home Exercise Program;Precautions; Energy Conservation  Patient Education: Pt provided verbal, written and practical instruction in supine ROM exercises to be completed on own daily to prevent further functional decline in LE abilty and functional mobilty  REQUIRES PT FOLLOW UP: Yes  Activity Tolerance  Activity Tolerance: Patient limited by endurance; Other (Drop in SPO2)       Patient Diagnosis(es): The encounter diagnosis was Dyspnea, unspecified type.      has a past medical history of Acute on chronic diastolic CHF (congestive heart failure) ([address] HIEN (acute kidney injury) ([address] HIEN (acute kidney injury) ([address] Asthma, CHF, Chronic obstructive lung disease ([address] Chronic respiratory failure with hypoxia ([address] COPD, Diabetes mellitus, new onset ([address] Former smoker, HTN, Hyperglycemia, Hyperlipidemia, Hypertension, Morbid obesity with BMI of 60.0-69.9, adult ([address] Neuromuscular disorder ([address] STEPH on CPAP, Pedal edema, Pneumonia, Pulmonary hypertension, moderate to severe ([address] Torn meniscus, and Wears glasses. has a past surgical history that includes  section (); Abdomen surgery; joint replacement; Cystoscopy (2019); Cystoscopy (N/A, 2019); hc cath power picc triple (2018); pr office/outpt visit,procedure only (N/A, 2018); Tracheotomy (2018); Gastrostomy tube placement (2018); and tracheostomy closure (2018). Restrictions  Restrictions/Precautions  Restrictions/Precautions: General Precautions,Contact Precautions,Isolation,Up as Tolerated (isolation to R/O Covid/RSV/Flu, (-)Covid per 3/20/22)  Required Braces or Orthoses?: No  Position Activity Restriction  Other position/activity restrictions: Pt on high flow, BMI 57.30 kg/m2, minimally ambulatory at baseline, R/O covid isolation  Vision/Hearing  Vision: Within Functional Limits  Hearing: Within functional limits     Subjective  General  Chart Reviewed: Yes  Additional Pertinent Hx: Pneumonia: on high flow nasal cannula,, Hypertension Congestive heart failure, COPD-  Response To Previous Treatment: Not applicable  Family / Caregiver Present: No  Follows Commands: Within Functional Limits  Other (Comment): pt awake.  alert and resting comfortably in bed  General Comment  Comments: Okay per RN to see  Subjective  Subjective: Pt report SOB and drop in SPO2 with minimal activity, weakness and decreased abilty to stand and transfer  Pain Screening  Patient Currently in Pain: No  Pain Assessment  Pain Level: 7  Pain Type: Chronic pain  Pain Location: Neck  Response to Pain Intervention: Patient Satisfied  Pre Treatment Pain Screening  Intervention List: Patient able to continue with treatment    Orientation  Orientation  Overall Orientation Status: Within Normal Limits  Social/Functional History  Social/Functional History  Lives With: Deaj Sprain (son currently staying with pt to assist with care as needed)  Bathroom Shower/Tub: Tub/Shower unit  Bathroom Toilet: Bedside commode  Bathroom Equipment: Grab bars in shower  Bathroom Accessibility: Accessible  Home Equipment: Rolling walker  ADL Assistance: Needs assistance  Homemaking Responsibilities: No  Ambulation Assistance: Needs assistance  Transfer Assistance: Needs assistance  Active : No  Patient's  Info: son   Occupation: On disability  Additional Comments: Pt has daily home care service 7 days/wk 9-5 M-F, 9-2 Sat, 6-8 Sun  Cognition   Cognition  Overall Cognitive Status: WNL  Cognition Comment: Pt is pleasent and cooperative, report very intuned to body and assist level needed, pt able to make needs known, good effort made    Objective     Observation/Palpation  Observation: Pt demonstrate general weakness at baseline and report minimally ambulatory due to baseline debility, requiring assist with all care and mobiltiy.   Edema: slight bilatera LE edema    AROM RLE (degrees)  RLE AROM: WNL  AROM LLE (degrees)  LLE AROM : WNL  AROM RUE (degrees)  RUE AROM : WNL  AROM LUE (degrees)  LUE AROM : WNL  Strength RLE  Strength RLE: WFL  Strength LLE  Strength LLE: WFL  Strength RUE  Strength RUE: WFL  Strength LUE  Strength LUE: WFL  Strength Other  Other: Pt demonstrates general weakness, decreased activity tolerance due to dyspnea  Tone RLE  RLE Tone: Normotonic  Tone LLE  LLE Tone: Normotonic  Sensation  Overall Sensation Status: WNL  Bed mobility  Bridging: Stand by assistance  Rolling to Left: Stand by assistance  Rolling to Right: Stand by assistance  Supine to Sit: Unable to assess  Sit to Supine: Unable to assess  Scooting: Unable to assess  Comment: Bed mobiltiy/ physical activity is limted due to fatigue, SOB and drop in  SPO2, Pt able to initiate mobilty poor tolerance unable to sit from supine on high flow with drop in SPO2 further acticity deferred  Transfers  Sit to Stand: Unable to assess  Stand to sit: Unable to assess  Bed to Chair: Unable to assess  Comment: Pt with poor tolerance and inability to complete task, Pt report assist needed at baseline, able to stand pivot with significant assist now too weak per Pt report. Ambulation  WB Status: Pt report minimally ambulatory at baseline limited due to SOB     Balance  Posture: Fair  Comments: Pt demonstrated fair ability to reposition, roll left and right using  bed rails, Pt refuse sitting activity due to drop in SPO2,  Exercises  Comments: Pt instructed in and completed bilateral LE rom exercises in supine to be completed on own as tolerated. Focus of activity is tolerance and pt abilty to complate task. pt with fair tolerance, rest breaks needed for time to recover Spo2 levels  Other exercises  Other exercises?: No     Plan   Plan  Times per week: 5-6x/wk  Specific instructions for Next Treatment: encourage sitting at EOB or up to chair as tolerated  Current Treatment Recommendations: Strengthening,Transfer Training,Endurance Training,Balance Training,Functional Mobility Training  Plan Comment: Pt in agreement with PT POC, pt apprensive with mobilty and report assist with all mobiltiy at home prior to admit. Safety Devices  Type of devices: All fall risk precautions in place,Call light within reach,Left in bed  Restraints  Initially in place: No                                                  AM-PAC Score     AM-PAC Inpatient Mobility without Stair Climbing Raw Score : 9 (03/20/22 1359)  AM-PAC Inpatient without Stair Climbing T-Scale Score : 32.44 (03/20/22 1359)  Mobility Inpatient CMS 0-100% Score: 76.07 (03/20/22 1359)  Mobility Inpatient without Stair CMS G-Code Modifier : CL (03/20/22 [facility]9)       Goals  Short term goals  Time Frame for Short term goals: 14 visits  Short term goal 1: Pt to demonstrate supine <> sit with Min A  Short term goal 2: Pt to be independent with HEP for bilateral LE ROM  Short term goal 3: pt to sit at EOB with Spo2 > 90% with good pt tolerance.   Patient Goals Patient goals : to improve strength       Therapy Time   Individual Concurrent Group Co-treatment   Time In 1120         Time Out 1158         Minutes 38         Timed Code Treatment Minutes: 23 Minutes       Miguel Damioc, PT

## 2022-03-20 NOTE — PROGRESS NOTES
[facility]  Internal Medicine Teaching Residency Program  Inpatient Daily Progress Note  ______________________________________________________________________________    Patient: Fariba Anderson  YOB: 1975   QLJ:[id number]    Acct: [de-identified]     Room: 2008/2008-01  Admit date: 3/18/2022  Today's date: 03/20/22  Number of days in the hospital: 2    SUBJECTIVE   Admitting Diagnosis: Shortness of breath  CC: Shortness of Breath (pt to er c/o shortness of breath, on home oxygen )  Pt examined at bedside. Chart & results reviewed.      No acute episodes overnight  Patient is heme stable and afebrile  Saturating well on high flow 25 liters  Used Bipap sparingly over night  AM CBC and BMP reviewed  Potassium replaced  Able to tolerate oral meds per swallow study  Urine output 1.6 liters over last 24 hours    ROS:  General: negative for chills, fatigue or fever  ENT: negative for headaches, nasal congestion, sore throat or visual changes  Allergy and Immunology: negative for postnasal drip or seasonal allergies  Hematological and Lymphatic: negative for bleeding problems, swollen lymph nodes  Respiratory: Positive for shortness of breath, cough and chest pain  Cardiovascular: negative for edema or palpitations  Gastrointestinal: negative for abdominal pain, change in bowel habits or nausea/vomiting  Genito-Urinary: negative for dysuria or urinary frequency/urgency  Musculoskeletal: negative for joint pain or joint swelling  Neurological: negative for numbness/tingling, seizures or weakness  Dermatological: negative for pruritus or rash    BRIEF HISTORY     The patient is a pleasant 46 y.o. female presents with a chief complaint of shortness of breath.  Past medical history significant for CHF, HIEN, COPD on 3-4 L of oxygen, asthma, hyperlipidemia, hypertension.  Patient states that she usually uses 3 to 4 L of oxygen.  Patient was brought in through the EMS when she became significantly short of breath this morning and her saturation dropped to low 70s and high 60s. .  According to the EMS, patient's oxygen tubing was kinked and she was saturating at 70%.  This improved with new tubing and patient's oxygen saturation improved to 90%.  Patient states that EMS placed her on a nonrebreather mask.     Patient states that she experiences chest pain occasionally- the pain is substernal, is at rest and sometimes pain decreases with changing position.  Patient states that the pain is sudden in onset describes the character as a pressure-like sensation, it radiates to the back. OBJECTIVE     Vital Signs:  BP (!) 142/74   Pulse 94   Temp 98.4 °F (36.9 °C) (Oral)   Resp 18   Ht 5' 6\" (1.676 m)   Wt (!) 355 lb (161 kg)   SpO2 91%   BMI 57.30 kg/m²     Temp (24hrs), Av.4 °F (36.9 °C), Min:98.2 °F (36.8 °C), Max:98.7 °F (37.1 °C)    In: -   Out: 800 [Urine:800]    Physical Exam:  Constitutional: This is a well developed, well nourished, Greater than 40 - Morbid Obesity / Extreme Obesity / Grade III 55y.o. year old female who is alert, oriented, cooperative and in no apparent distress. Head:normocephalic and atraumatic. EENT:  PERRLA. No conjunctival injections. Septum was midline, mucosa was without erythema, exudates or cobblestoning. No thrush was noted. Neck: Supple without thyromegaly. No elevated JVP. Trachea was midline. Respiratory: Chest was symmetrical without dullness to percussion. Breath sounds bilaterally were clear to auscultation. There were no wheezes, rhonchi or rales. There is no intercostal retraction or use of accessory muscles. Cardiovascular: Regular without murmur, clicks, gallops or rubs. Abdomen: Slightly rounded and soft. No rebound, rigidity or guarding was appreciated. Lymphatic: No lymphadenopathy. Musculoskeletal: Normal curvature of the spine. No gross muscle weakness.     Extremities:  No lower extremity edema, ulcerations, tenderness, varicosities or erythema. Muscle size, tone and strength are normal.  No involuntary movements are noted. Skin:  Warm and dry. Good color, turgor and pigmentation. No lesions or scars.  No cyanosis or clubbing  Neurological/Psychiatric: The patient's general behavior, level of consciousness, thought content and emotional status is normal.      Medications:  Scheduled Medications:    predniSONE  40 mg Oral Daily    levofloxacin  750 mg IntraVENous Q24H    hydrALAZINE  25 mg Oral 3 times per day    bumetanide  2 mg IntraVENous Once    bumetanide  2 mg Oral BID    sodium chloride flush  5-40 mL IntraVENous 2 times per day    enoxaparin  30 mg SubCUTAneous BID    amLODIPine  5 mg Oral Daily    atorvastatin  40 mg Oral Nightly    budesonide-formoterol  2 puff Inhalation BID    fluticasone  1 spray Nasal Daily    folic acid  1 mg Oral Daily    gabapentin  300 mg Oral TID    isosorbide dinitrate  20 mg Oral TID    pantoprazole  40 mg Oral QAM AC    sertraline  100 mg Oral Daily    spironolactone  25 mg Oral Daily    tiotropium  2 puff Inhalation Daily    guaiFENesin  600 mg Oral BID    docusate sodium  100 mg Oral Daily     Continuous Infusions:    dextrose      sodium chloride 25 mL (03/20/22 0840)     PRN MedicationsLORazepam, 1 mg, Q4H PRN  glucose, 15 g, PRN  dextrose, 12.5 g, PRN  glucagon (rDNA), 1 mg, PRN  dextrose, 100 mL/hr, PRN  sodium chloride flush, 5-40 mL, PRN  sodium chloride, 25 mL, PRN  ondansetron, 4 mg, Q8H PRN   Or  ondansetron, 4 mg, Q6H PRN  polyethylene glycol, 17 g, Daily PRN  acetaminophen, 650 mg, Q6H PRN   Or  acetaminophen, 650 mg, Q6H PRN  oxyCODONE-acetaminophen, 2 tablet, Q6H PRN  sodium chloride, 1 spray, PRN  albuterol sulfate HFA, 2 puff, Q6H PRN      Diagnostic Labs:  CBC:   Recent Labs     03/18/22  1131 03/19/22  0637 03/20/22  0626   WBC 11.5* 8.5 8.0   RBC 4.16 3.92* 3.53*   HGB 10.5* 9.6* 8.9*   HCT 37.2 35.7* 32.7*   MCV 89.4 91.1 92.6   RDW 14.8* 14.8* 15.0*   PLT See Reflexed IPF Result 298 262     BMP:   Recent Labs     03/18/22  1131 03/19/22  0637 03/20/22  0626    141 138   K 3.9 3.5* 3.4*   CL 90* 90* 91*   CO2 31 38* 37*   BUN 18 16 14   CREATININE 0.99* 1.06* 0.82     BNP: No results for input(s): BNP in the last 72 hours. PT/INR: No results for input(s): PROTIME, INR in the last 72 hours. APTT: No results for input(s): APTT in the last 72 hours. CARDIAC ENZYMES: No results for input(s): CKMB, CKMBINDEX, TROPONINI in the last 72 hours. Invalid input(s): CKTOTAL;3  FASTING LIPID PANEL:  Lab Results   Component Value Date    CHOL 144 11/17/2018    HDL 42 10/07/2020    TRIG 68 11/17/2018     LIVER PROFILE: No results for input(s): AST, ALT, ALB, BILIDIR, BILITOT, ALKPHOS in the last 72 hours. MICROBIOLOGY:   Lab Results   Component Value Date/Time    CULTURE NO GROWTH 6 DAYS 06/29/2021 07:03 PM     Imaging:    XR CHEST PORTABLE    Result Date: 3/19/2022  Findings suggestive of worsening bibasilar pneumonia. XR CHEST PORTABLE    Result Date: 3/18/2022  Stable cardiomegaly with mild pulmonary vascular congestion. CT CHEST PULMONARY EMBOLISM W CONTRAST    Result Date: 3/18/2022  No evidence of pulmonary embolism. There is patchy ill-defined opacification lower lung fields bilaterally suggesting infiltrate with ground-glass opacity concerning for pneumonia as well in the upper lung fields. Reactive adenopathy throughout the mediastinum and hilar regions. RECOMMENDATIONS: Unavailable     FL MODIFIED BARIUM SWALLOW W VIDEO    Result Date: 3/19/2022  No penetration or aspiration with the above administered substances. Please see separate speech pathology report for full discussion of findings and recommendations. ASSESSMENT & PLAN     ASSESSMENT / PLAN:     Pneumonia  Improved. Levofloxacin 750 mg intravenous every 24 hours. Plan to transition to oral tomorrow. COVID PCR and respiratory panel pending.      Acute on Chronic CHF: Home Isordil 20 mg TID and Aldactone 25 Daily restarted. Continue Bumex PO 2 mg BID. Echo ordered and pending. COPD Exacerbation:  Pulmonology following. Recommendations appreciated. Continue Albuterol Inhaler, Symbicort and Spiriva. Started on PO Prednisone 40 daily for 5 days. Trilogy Vent recommended on discharge. Hypertension: Norvasc 5 mg daily. Hyperlipidemia: Lipitor 40 mg daily. DVT ppx: Lovenox  GI ppx: Protonix     PT/OT/SW: On Board  Discharge Planning: Ongoing, pending echo and resolution of symptoms    Eleni Garcia MD  Internal Medicine Resident, PGY-2  [facility]; [place], [place]  3/20/2022, 4:34 PM    I have discussed the care of [address] [place] [address] , including pertinent history and exam findings,    today with the resident. I have seen and examined the patient and the key elements of all parts of the encounter have been performed by me . I agree with the assessment, plan and orders as documented by the resident. Principal Problem:    Shortness of breath  Active Problems:    Pneumonia    HTN (hypertension)    COPD (chronic obstructive pulmonary disease) (HCC)    Pulmonary hypertension, moderate to severe (HCC)    Morbid obesity with BMI of 50.0-59.9, adult (HCC)    STEPH (obstructive sleep apnea)    Normocytic anemia    Dyspnea    Prediabetes    Hyperlipidemia    Hypokalemia    Hypomagnesemia  Resolved Problems:    * No resolved hospital problems. *        Overall  course ;                                   are improving over time.         High Pro BNP   Pulmonary shadows Could be CHF   Will continue with IV lasix   Keep in Negative balance   Viral PCR pending   Needs to be Compliant with CPAP ( was not using CPAP at Home )           Electronically signed by Melida Hernández MD '

## 2022-03-21 NOTE — PROGRESS NOTES
Speech Language Pathology  Speech Language Pathology  [facility]    Dysphagia Treatment Note    Date: 3/21/2022  Patients Name: Amrik Jauregui  MRN: [id number]  Diagnosis:   Patient Active Problem List   Diagnosis Code    Asthma J45.909    Pneumonia J18.9    HTN (hypertension) I10    Torn meniscus S83.209A    Chest pain R07.9    Pulmonary hypertension, moderate to severe ([facility] I27.20    Morbid obesity with BMI of 50.0-59.9, adult ([facility] E66.01, Z68.43    Obesity hypoventilation syndrome ([facility]) E66.2    H/O tracheostomy Z98.890    STEPH (obstructive sleep apnea) G47.33    Right heart failure, unspecified ([facility]) I50.810    Acute on chronic diastolic heart failure ([facility]) I50.33    Acute on chronic congestive heart failure ([facility]) I50.9    Diabetes mellitus, new onset ([facility] E11.9    Dizziness R42    Normocytic anemia D64.9    Pneumonia of both lower lobes due to infectious organism J18.9    NSTEMI (non-ST elevated myocardial infarction) ([facility]) I21.4    Acute pulmonary edema ([facility]) J81.0    Hypertensive emergency I16.1    Acute respiratory failure with hypoxia and hypercapnia ([facility]) J96.01, J96.02    Smoker F17.200    Morbid obesity ([facility]) E66.01    SOB (shortness of breath) R06.02    COPD (chronic obstructive pulmonary disease) ([facility]) J44.9    ARDS (adult respiratory distress syndrome) ([facility]) J80    Fever R50.9    Disease due to rhinovirus B34.8    Encounter for PEG (percutaneous endoscopic gastrostomy) ([facility] Z43.1    Status post tracheostomy ([facility] Z93.0    Status post insertion of percutaneous endoscopic gastrostomy (PEG) tube ([facility] Z93.1    Rhinovirus infection B34.8    Acute non-recurrent pansinusitis J01.40    Chronic respiratory failure ([facility]) J96.10    Pulmonary hypertension ([facility]) I27.20    Chronic narcotic dependence ([facility]) F11.20    Chronically on benzodiazepine therapy Z79.899    Neuropathy G62.9    Epigastric pain R10.13    Muscle spasm M62.838    Shortness of breath R06.02    Dyspnea R06.00    Prediabetes R73.03    Hyperlipidemia E78.5    Hypokalemia E87.6    Hypomagnesemia E83.42     Pain: 0    Dysphagia Treatment  Treatment time: 9851-0069; no charge for visit    Subjective: [x] Alert [x] Cooperative     [] Confused     [] Agitated    [] Lethargic    Objective/Assessment:    Pt. Seen for diet tolerance monitoring. Pt and RN tolerating diet well and implementing strategies independently. Pt politely refuses PO trials despite education and encouragement. ST and pt reviewed safe swallowing strategies w/ good return. Recommend pt continue current diet (Regular diet with thin liquids). ST to d/c pt from dysphagia services at this time; available for re-consult as needed. Plan:  [] Continue ST services    [x] Discharge from ST:      Discharge recommendations: []  Further therapy recommended at discharge. The patient should be able to tolerate at least 3 hours of therapy per day over 5 days or 15 hours over 7 days. [] Further therapy recommended at discharge. [x] No therapy recommended at discharge.      Treatment completed by: Concepción Saldivar, SLP

## 2022-03-21 NOTE — PROGRESS NOTES
Occupational Therapy   Occupational Therapy Initial Assessment  Date: 3/21/2022   Patient Name: Koffi Means  MRN: [id number]     : 1975    Date of Service: 3/21/2022  Chief Complaint   Patient presents with    Shortness of Breath     pt to er c/o shortness of breath, on home oxygen        Discharge Recommendations: Equipment recommendations listed below are based on what the patient would need if they were able to return to prior living arrangements at the time of discharge. Patient would benefit from continued therapy after discharge  OT Equipment Recommendations  Equipment Needed: Yes  Mobility Devices: ADL Assistive Devices; Wheelchair  Wheelchair: Power Wheelchair  ADL Assistive Devices: Transfer Tub Bench;Reacher;Long-handled Shoe [place];Long-handled Sponge;Sock-Aid Hard;Dressing Stick;Grab Bars - toilet    Assessment   Performance deficits / Impairments: Decreased functional mobility ; Decreased endurance;Decreased ADL status; Decreased balance;Decreased high-level IADLs  Prognosis: Fair  Decision Making: Medium Complexity  OT Education: OT Role;Plan of Care;Precautions;Transfer Training  Patient Education: Good return from pt  REQUIRES OT FOLLOW UP: Yes  Activity Tolerance  Activity Tolerance: Patient limited by fatigue;Treatment limited secondary to medical complications (free text)  Activity Tolerance: SOB  Safety Devices  Safety Devices in place: Yes  Type of devices: Patient at risk for falls; Left in bed;Nurse notified;Call light within reach;Gait belt  Restraints  Initially in place: No         Patient Diagnosis(es): The encounter diagnosis was Dyspnea, unspecified type.      has a past medical history of Acute on chronic diastolic CHF (congestive heart failure) ([address] HIEN (acute kidney injury) ([address] HIEN (acute kidney injury) ([address] Asthma, CHF, Chronic obstructive lung disease ([address] Chronic respiratory failure with hypoxia ([address] COPD, Diabetes mellitus, new onset ([address] Former smoker, HTN, Hyperglycemia, Hyperlipidemia, Hypertension, Morbid obesity with BMI of 60.0-69.9, adult (HCC), Neuromuscular disorder ([address] STEPH on CPAP, Pedal edema, Pneumonia, Pulmonary hypertension, moderate to severe ([address] Torn meniscus, and Wears glasses. has a past surgical history that includes  section (); Abdomen surgery; joint replacement; Cystoscopy (2019); Cystoscopy (N/A, 2019); hc cath power picc triple (2018); pr office/outpt visit,procedure only (N/A, 2018); Tracheotomy (2018); Gastrostomy tube placement (2018); and tracheostomy closure (2018).          Restrictions  Restrictions/Precautions  Restrictions/Precautions: General Precautions,Up as Tolerated,Fall Risk,Contact Precautions  Required Braces or Orthoses?: No  Position Activity Restriction  Other position/activity restrictions: Pt on high flow, BMI 57.30 kg/m2, minimally ambulatory at baseline    Subjective   General  Patient assessed for rehabilitation services?: Yes  Family / Caregiver Present: No  Diagnosis: SOB, COPD exac, PNA  Patient Currently in Pain: Denies  Pain Assessment  Pain Assessment: 0-10  Pain Level: 0  Vital Signs  Temp: 99.5 °F (37.5 °C)  Temp Source: Oral  Pulse: 99  Heart Rate Source: Monitor  Resp: 24  BP: 122/70  BP Location: Right Arm  MAP (mmHg): 85  Patient Position: High fowlers  Patient Currently in Pain: Denies  Oxygen Therapy  SpO2: 90 %  O2 Device: Heated high flow cannula  Social/Functional History  Social/Functional History  Lives With: Son (Recently son, son's sig other and yound child moved in with pt to assist pt)  Type of Home: House (First floor of a duplex)  Home Layout: One level  Home Access: Ramped entrance  Bathroom Shower/Tub: Tub/Shower unit  Bathroom Toilet: Bedside commode  Bathroom Equipment: Grab bars in shower  Bathroom Accessibility: Accessible  Home Equipment: Rolling walker,Wheelchair-manual,Oxygen (uses RW to stand>pivot at baseline recently, doesn;t like manual RW and wants a electric w/c or scooter, on 3-4L Home O2)  Receives Help From: Home health  ADL Assistance: Needs assistance (Pt onl (I) with feeding and some grooming tasks, aides assist with hair care/bathing/dressing/toileting)  Homemaking Responsibilities: No  Ambulation Assistance: Needs assistance (Minimal ambulation at baseline recently per pt)  Transfer Assistance: Independent (uses RW to stand pivot per pt this date)  Active : No  Patient's  Info: son   Occupation: On disability  Leisure & Hobbies: shopping, reading, time with grandchild  Additional Comments: Pt has daily home care service 7 days/wk 9-5 M-F, 9-2 Sat, 6-8 Sun     Objective   Vision: Impaired  Vision Exceptions: Wears glasses at all times  Hearing: Within functional limits    Orientation  Overall Orientation Status: Within Functional Limits     Balance  Sitting Balance: Minimal assistance (Mod A required to ensure pt was not slidiing off Edge of bariatric bed, writer blocked pt's L knee, high BMI and short stature so pt's feet dangling off floor at times)  Standing Balance: Minimal assistance  Standing Balance  Time: 2 min  Activity: Pt stood bedside and BSC x2 this date each, short func mob using RW  Functional Mobility  Functional - Mobility Device: Rolling Walker (Bariatric sized)  Assist Level: Minimal assistance  Functional Mobility Comments: Adaptive stand>pivot to/from BSC from EOB, took ~3 small steps, fatigues quickly and fall risk, hi-flow  ADL  Feeding: Setup; Increased time to complete;Modified independent   Grooming: Setup; Increased time to complete;Stand by assistance  UE Bathing: Increased time to complete;Setup;Minimal assistance  LE Bathing: Setup; Increased time to complete;Maximum assistance  UE Dressing: Setup; Increased time to complete;Maximum assistance  LE Dressing: Dependent/Total  Toileting: Setup; Increased time to complete;Maximum assistance  Additional Comments: Pt receives assistance for most ADL's at baseline, pt with high BMI and fatigues quickly, pt stand and transferred to [facility] for successful and large BM with RN in room, pt stood briefly for RN to perform bottom-care, pt then required a sitting rest break before returning to EOB using bariatric RW, on high-flow entire session  Tone RUE  RUE Tone: Normotonic  Tone LUE  LUE Tone: Normotonic  Coordination  Movements Are Fluid And Coordinated: No  Coordination and Movement description: Decreased speed;Right UE;Left UE;Tremors  Quality of Movement Other  Comment: Shakiness noted when pt asked to touch nose with B/L index digits-likely chronic deficit     Bed mobility  Supine to Sit: Moderate assistance  Sit to Supine: 2 Person assistance;Maximum assistance  Scooting: Minimal assistance  Comment: Pt supine in bed upon arrival/exit this date with RN present, HOB elevated d/t orthopnea  Transfers  Sit to stand: Minimal assistance  Stand to sit: Minimal assistance  Transfer Comments: Bariatric RW used, pt educated on proper hand placement with good return     Cognition  Overall Cognitive Status: WFL        Sensation  Overall Sensation Status: WFL      LUE AROM (degrees)  LUE AROM : WFL  RUE AROM (degrees)  RUE AROM : WFL  LUE Strength  Gross LUE Strength: WFL  L Shoulder Flex: 4+/5  L Elbow Flex: 4+/5  L Elbow Ext: 4+/5  L Hand General: 4+/5  RUE Strength  Gross RUE Strength: Exceptions to [facility]  R Shoulder Flex: 4/5  R Elbow Flex: 4+/5  R Elbow Ext: 4/5  R Hand General: 4+/5  RUE Strength Comment: Pt states having chronic neuropathy resulting in RUE weakness, pt is R-handed         Plan   Plan  Times per week: 3-5x    AM-PAC Score        AM-PAC Inpatient Daily Activity Raw Score: 14 (03/21/22 1138)  AM-PAC Inpatient ADL T-Scale Score : 33.39 (03/21/22 1138)  ADL Inpatient CMS 0-100% Score: 59.67 (03/21/22 1138)  ADL Inpatient CMS G-Code Modifier : CK (03/21/22 1138)    Goals  Short term goals  Time Frame for Short term goals: Pt will by discharge  Short term goal 1: demo ADL UB bathing/dressing activity while sitting unsupported with increased time, setup, CGA  Short term goal 2: demo ADL LB bathing/dressing activity  with increased time, setup, mod A, sock-aid/reacher PRN  Short term goal 3: demo good safety awareness during short func mob at bedside using bariatric RW and SBA only  Short term goal 4: demo CGA for all bed mobility using bed rails PRN  Short term goal 5: demo/identify 2 EC/WS techniques during func activity with 1 cue only     Therapy Time   Individual Concurrent Group Co-treatment   Time In 0849         Time Out 0931         Minutes 42         Timed Code Treatment Minutes: Alyxu Eileen 11, OTR/L

## 2022-03-21 NOTE — PROGRESS NOTES
[facility]  Internal Medicine Teaching Residency Program  Inpatient Daily Progress Note  ______________________________________________________________________________    Patient: Jono Ramsey  YOB: 1975   OPS:[id number]    Acct: [de-identified]     Room: 2008/2008-01  Admit date: 3/18/2022  Today's date: 03/21/22  Number of days in the hospital: 3    SUBJECTIVE   Admitting Diagnosis: Shortness of breath  CC: Shortness of Breath (pt to er c/o shortness of breath, on home oxygen )  Pt examined at bedside. Chart & results reviewed.      No acute episodes overnight  Patient is heme stable and afebrile  Saturating well on high flow 25 liters, fio2 65%  On oral bumex today, pulm agreed with oral dosing  Used Bipap sparingly over night  AM CBC and BMP reviewed  Potassium replaced  Able to tolerate oral meds per swallow study  Urine output 1.6 liters over last 24 hours    ROS:  General: negative for chills, fatigue or fever  ENT: negative for headaches, nasal congestion, sore throat or visual changes  Allergy and Immunology: negative for postnasal drip or seasonal allergies  Hematological and Lymphatic: negative for bleeding problems, swollen lymph nodes  Respiratory: Positive for shortness of breath, cough and chest pain  Cardiovascular: negative for edema or palpitations  Gastrointestinal: negative for abdominal pain, change in bowel habits or nausea/vomiting  Genito-Urinary: negative for dysuria or urinary frequency/urgency  Musculoskeletal: negative for joint pain or joint swelling  Neurological: negative for numbness/tingling, seizures or weakness  Dermatological: negative for pruritus or rash    BRIEF HISTORY     The patient is a pleasant 46 y.o. female presents with a chief complaint of shortness of breath.  Past medical history significant for CHF, HIEN, COPD on 3-4 L of oxygen, asthma, hyperlipidemia, hypertension.  Patient states that she usually uses 3 to 4 L of oxygen.  Patient was brought in through the EMS when she became significantly short of breath this morning and her saturation dropped to low 70s and high 60s. .  According to the EMS, patient's oxygen tubing was kinked and she was saturating at 70%.  This improved with new tubing and patient's oxygen saturation improved to 90%.  Patient states that EMS placed her on a nonrebreather mask.     Patient states that she experiences chest pain occasionally- the pain is substernal, is at rest and sometimes pain decreases with changing position.  Patient states that the pain is sudden in onset describes the character as a pressure-like sensation, it radiates to the back. OBJECTIVE     Vital Signs:  /70   Pulse 99   Temp 99.5 °F (37.5 °C) (Oral)   Resp 20   Ht 5' 6\" (1.676 m)   Wt (!) 355 lb (161 kg)   SpO2 94%   BMI 57.30 kg/m²     Temp (24hrs), Av.9 °F (37.2 °C), Min:98.4 °F (36.9 °C), Max:99.5 °F (37.5 °C)    In: 840   Out: 1150 [Urine:1150]    Physical Exam:  Constitutional: This is a well developed, well nourished, Greater than 40 - Morbid Obesity / Extreme Obesity / Grade III 55y.o. year old female who is alert, oriented, cooperative and in no apparent distress. Head:normocephalic and atraumatic. EENT:  PERRLA. No conjunctival injections. Septum was midline, mucosa was without erythema, exudates or cobblestoning. No thrush was noted. Neck: Supple without thyromegaly. No elevated JVP. Trachea was midline. Respiratory: Chest was symmetrical without dullness to percussion. Breath sounds bilaterally were clear to auscultation. There were no wheezes, rhonchi or rales. There is no intercostal retraction or use of accessory muscles. Cardiovascular: Regular without murmur, clicks, gallops or rubs. Abdomen: Slightly rounded and soft. No rebound, rigidity or guarding was appreciated. Lymphatic: No lymphadenopathy. Musculoskeletal: Normal curvature of the spine.   No gross muscle weakness. Extremities:  No lower extremity edema, ulcerations, tenderness, varicosities or erythema. Muscle size, tone and strength are normal.  No involuntary movements are noted. Skin:  Warm and dry. Good color, turgor and pigmentation. No lesions or scars.  No cyanosis or clubbing  Neurological/Psychiatric: The patient's general behavior, level of consciousness, thought content and emotional status is normal.      Medications:  Scheduled Medications:    magnesium hydroxide  30 mL Oral Daily    predniSONE  40 mg Oral Daily    docusate sodium  100 mg Oral Daily    levofloxacin  750 mg IntraVENous Q24H    hydrALAZINE  25 mg Oral 3 times per day    bumetanide  2 mg Oral BID    sodium chloride flush  5-40 mL IntraVENous 2 times per day    enoxaparin  30 mg SubCUTAneous BID    amLODIPine  5 mg Oral Daily    atorvastatin  40 mg Oral Nightly    budesonide-formoterol  2 puff Inhalation BID    fluticasone  1 spray Nasal Daily    folic acid  1 mg Oral Daily    gabapentin  300 mg Oral TID    isosorbide dinitrate  20 mg Oral TID    pantoprazole  40 mg Oral QAM AC    sertraline  100 mg Oral Daily    spironolactone  25 mg Oral Daily    tiotropium  2 puff Inhalation Daily    guaiFENesin  600 mg Oral BID     Continuous Infusions:    dextrose      sodium chloride 25 mL (03/20/22 0840)     PRN MedicationsLORazepam, 1 mg, Q4H PRN  glucose, 15 g, PRN  dextrose, 12.5 g, PRN  glucagon (rDNA), 1 mg, PRN  dextrose, 100 mL/hr, PRN  sodium chloride flush, 5-40 mL, PRN  sodium chloride, 25 mL, PRN  ondansetron, 4 mg, Q8H PRN   Or  ondansetron, 4 mg, Q6H PRN  polyethylene glycol, 17 g, Daily PRN  acetaminophen, 650 mg, Q6H PRN   Or  acetaminophen, 650 mg, Q6H PRN  oxyCODONE-acetaminophen, 2 tablet, Q6H PRN  sodium chloride, 1 spray, PRN  albuterol sulfate HFA, 2 puff, Q6H PRN      Diagnostic Labs:  CBC:   Recent Labs     03/19/22  0637 03/20/22  0626 03/21/22  0607   WBC 8.5 8.0 9.1   RBC 3.92* 3.53* 3.88*   HGB 9. 6* 8.9* 10.0*   HCT 35.7* 32.7* 33.8*   MCV 91.1 92.6 87.1   RDW 14.8* 15.0* 15.1*    262 See Reflexed IPF Result     BMP:   Recent Labs     03/19/22  0637 03/20/22  0626 03/21/22  0607    138 137   K 3.5* 3.4* 3.9   CL 90* 91* 92*   CO2 38* 37* 33*   BUN 16 14 14   CREATININE 1.06* 0.82 0.99*     BNP: No results for input(s): BNP in the last 72 hours. PT/INR: No results for input(s): PROTIME, INR in the last 72 hours. APTT: No results for input(s): APTT in the last 72 hours. CARDIAC ENZYMES: No results for input(s): CKMB, CKMBINDEX, TROPONINI in the last 72 hours. Invalid input(s): CKTOTAL;3  FASTING LIPID PANEL:  Lab Results   Component Value Date    CHOL 144 11/17/2018    HDL 42 10/07/2020    TRIG 68 11/17/2018     LIVER PROFILE: No results for input(s): AST, ALT, ALB, BILIDIR, BILITOT, ALKPHOS in the last 72 hours. MICROBIOLOGY:   Lab Results   Component Value Date/Time    CULTURE NO GROWTH 6 DAYS 06/29/2021 07:03 PM     Imaging:    XR CHEST PORTABLE    Result Date: 3/19/2022  Findings suggestive of worsening bibasilar pneumonia. XR CHEST PORTABLE    Result Date: 3/18/2022  Stable cardiomegaly with mild pulmonary vascular congestion. CT CHEST PULMONARY EMBOLISM W CONTRAST    Result Date: 3/18/2022  No evidence of pulmonary embolism. There is patchy ill-defined opacification lower lung fields bilaterally suggesting infiltrate with ground-glass opacity concerning for pneumonia as well in the upper lung fields. Reactive adenopathy throughout the mediastinum and hilar regions. RECOMMENDATIONS: Unavailable     FL MODIFIED BARIUM SWALLOW W VIDEO    Result Date: 3/19/2022  No penetration or aspiration with the above administered substances. Please see separate speech pathology report for full discussion of findings and recommendations. ASSESSMENT & PLAN     ASSESSMENT / PLAN:     Pneumonia  Improved. Levofloxacin 750 mg intravenous every 24 hours. Plan to transition to oral today. COVID PCR and respiratory panel negative    Acute on Chronic CHF: Home Isordil 20 mg TID and Aldactone 25 Daily restarted. Switched to oral Bumex PO 2 mg BID. Echo ordered and pending. COPD Exacerbation:  Pulmonology following. Recommendations appreciated. Continue Albuterol Inhaler, Symbicort and Spiriva. Started on PO Prednisone 40 daily for 5 days. Trilogy Vent recommended on discharge. fio2 requirements coming down    Hypertension: Norvasc 5 mg daily. Hyperlipidemia: Lipitor 40 mg daily. DVT ppx: Lovenox  GI ppx: Protonix     PT/OT/SW: On Board  Discharge Planning: Ongoing, pending echo and resolution of symptoms    August Tong MD  Internal Medicine Resident, PGY-2  [facility]; [place], [place]  3/21/2022, 3:14 PM  Attending Physician Statement  I have discussed the care of [address] [place] [address] and I have examined the patient myselft and taken ros and hpi , including pertinent history and exam findings,  with the resident. I have reviewed the key elements of all parts of the encounter with the resident. I agree with the assessment, plan and orders as documented by the resident.       Electronically signed by Masoud Soto MD

## 2022-03-21 NOTE — PROGRESS NOTES
PULMONARY & CRITICAL CARE MEDICINE PROGRESS  NOTE     Patient:  Greg Nicole  MRN: [id number]  Admit date: 3/18/2022  Primary Care Physician: Hailey Gurrola DO  Consulting Physician: Sondra Elena MD  CODE Status: Full Code  LOS: 3    SUBJECTIVE     I personally interviewed/examined the patient, reviewed interval history and interpreted all available radiographic, laboratory data at the time of service. Chief Compliant/Reason for Initial Consult:   Acute on chronic hypoxic hypercapnic hypercapnic respiratory failure  COPD/CHF exacerbation  Pneumonia/pulmonary edema    Brief Hospital Course: The patient is a 55 y.o. female presents with complaint of shortness of breath, associated with stuffy nose, productive cough, clear sputum. Patient desatted to high 60s and low 70s. According to EMS, patient's oxygen tubing was kinked and was saturating at 70%. Improved with new tubing. She was placed on nonrebreather. Patient also complained of occasional substernal chest pain pressure-like sensation radiating to back at the time of presentation. On presentation, patient afebrile hemodynamically stable saturating at 96% on high flow 90% FiO2 30 L/minute   Pertinent labs: proBNP 1086  WBC 11.5  Procalcitonin 0.25  Negative for COVID-19 testing  VBG: pH 7.26/PCO2 85/PO2 40.6/bicarb 37. Acute on chronic hypercapnic respiratory acidosis with appropriate metabolic compensation  Chest x-ray: Patchy airspace opacity right worse than left. CT PE: Negative for PE. Groundglass opacity present. Reactive adenopathy throughout mediastinum and hilar region. Patchy consolidation right lower lobe. Interval History:  03/21/22  Overnight events noted, imaging studies reviewed. She is afebrile last 24-hour T-max is 98.4 she is hemodynamically stable heart rate is in 90s to 100. Respiratory rate is usually in low to mid 20s.   She remains on high flow nasal cannula this morning 25 L and 70%. She did use CPAP according to patient last night according nursing staff she was not compliant with CPAP overnight. Call patient she is feeling slightly better she has mild cough denies wheezing denies chest pain does complain of shortness of breath. Review of Systems:  Review of Systems   Constitutional: Positive for fatigue. Negative for fever. HENT: Positive for postnasal drip. Negative for rhinorrhea, sore throat, trouble swallowing and voice change. Eyes: Negative for photophobia and redness. Respiratory: Positive for cough and shortness of breath. Negative for wheezing. Cardiovascular: Negative for chest pain and leg swelling. Gastrointestinal: Negative for abdominal pain, diarrhea and vomiting. Endocrine: Negative for polydipsia, polyphagia and polyuria. Genitourinary: Negative for difficulty urinating, dysuria, frequency and hematuria. Musculoskeletal: Negative. Allergic/Immunologic: Negative. Neurological: Positive for dizziness. Negative for syncope, speech difficulty and headaches. Hematological: Negative for adenopathy. Does not bruise/bleed easily. Psychiatric/Behavioral: Negative.         OBJECTIVE     VITAL SIGNS:   LAST-  /78   Pulse 103   Temp 99.5 °F (37.5 °C) (Oral)   Resp 21   Ht 5' 6\" (1.676 m)   Wt (!) 355 lb (161 kg)   SpO2 91%   BMI 57.30 kg/m²   8-24 HR RANGE-  TEMP Temp  Av.6 °F (37 °C)  Min: 98.2 °F (36.8 °C)  Max: 99.5 °F (97.7 °C)   BP Systolic (34SXO), HLU:544 , Min:127 , XFD:738      Diastolic (34OCD), PJP:75, Min:74, Max:82     PULSE Pulse  Av.9  Min: 89  Max: 104   RR Resp  Av  Min: 21  Max: 21   O2 SAT SpO2  Av.7 %  Min: 90 %  Max: 91 %   OXYGEN DELIVERY O2 Flow Rate (L/min)  Av L/min  Min: 25 L/min  Max: 25 L/min     Systemic Examination:   Physical Exam  General appearance - looks comfortable and in mildly tachypneic and not in acute distress  Mental status - alert, oriented to person, place, and time  Eyes - pupils equal and reactive, extraocular eye movements intact  Mouth - mucous membranes moist, pharynx normal without lesions, Mallampati 2  Neck -Short thick neck supple, no significant adenopathy  Chest - Chest was symmetrical without dullness to percussion. Air entry is severely reduced and distant bilaterally with mild rhonchi and prolonged expiration no expiratory wheezing mild crackles present at bases. There were no wheezes, rhonchi or rales.   There is no intercostal recession or use of accessory muscles  Heart - normal rate, regular rhythm, normal S1, S2, no murmurs, rubs, clicks or gallops  Abdomen - soft, nontender, nondistended, no masses or organomegaly  Neurological - alert, oriented, normal speech, no focal findings or movement disorder noted  Extremities - peripheral pulses normal, positive pedal edema, no clubbing or cyanosis  Skin - normal coloration and turgor, no rashes, no suspicious skin lesions noted     DATA REVIEW     Medications:  Scheduled Meds:   magnesium hydroxide  30 mL Oral Daily    predniSONE  40 mg Oral Daily    docusate sodium  100 mg Oral Daily    levofloxacin  750 mg IntraVENous Q24H    hydrALAZINE  25 mg Oral 3 times per day    bumetanide  2 mg Oral BID    sodium chloride flush  5-40 mL IntraVENous 2 times per day    enoxaparin  30 mg SubCUTAneous BID    amLODIPine  5 mg Oral Daily    atorvastatin  40 mg Oral Nightly    budesonide-formoterol  2 puff Inhalation BID    fluticasone  1 spray Nasal Daily    folic acid  1 mg Oral Daily    gabapentin  300 mg Oral TID    isosorbide dinitrate  20 mg Oral TID    pantoprazole  40 mg Oral QAM AC    sertraline  100 mg Oral Daily    spironolactone  25 mg Oral Daily    tiotropium  2 puff Inhalation Daily    guaiFENesin  600 mg Oral BID     Continuous Infusions:   dextrose      sodium chloride 25 mL (03/20/22 0840)     LABS:-  ABG:   No results for input(s): POCPH, POCPCO2, POCPO2, POCHCO3, CWSQ0XEA in the last 72 hours. CBC:   Recent Labs     03/19/22  0637 03/20/22  0626 03/21/22  0607   WBC 8.5 8.0 9.1   HGB 9.6* 8.9* 10.0*   HCT 35.7* 32.7* 33.8*   MCV 91.1 92.6 87.1    262 See Reflexed IPF Result   LYMPHOPCT 15* 15* 3*   RBC 3.92* 3.53* 3.88*   MCH 24.5* 25.2 25.8   MCHC 26.9* 27.2* 29.6   RDW 14.8* 15.0* 15.1*     BMP:   Recent Labs     03/19/22  0637 03/20/22  0626 03/21/22  0607    138 137   K 3.5* 3.4* 3.9   CL 90* 91* 92*   CO2 38* 37* 33*   BUN 16 14 14   CREATININE 1.06* 0.82 0.99*   GLUCOSE 109* 112* 145*     Liver Function Test:   No results for input(s): PROT, LABALBU, ALT, AST, GGT, ALKPHOS, BILITOT in the last 72 hours. Amylase/Lipase:  No results for input(s): AMYLASE, LIPASE in the last 72 hours. Coagulation Profile:   No results for input(s): INR, PROTIME, APTT in the last 72 hours. Cardiac Enzymes:  No results for input(s): CKTOTAL, CKMB, CKMBINDEX, TROPONINI in the last 72 hours. Lactic Acid:  No results found for: LACTA  BNP:   No results found for: BNP  D-Dimer:  Lab Results   Component Value Date    DDIMER 0.86 03/19/2022     Others:   Lab Results   Component Value Date    TSH 2.36 02/23/2019     Lab Results   Component Value Date    LAUREN NEGATIVE 05/07/2019    CRP 8.0 (H) 06/09/2018     No results found for: Giulia Jacob  Lab Results   Component Value Date    IRON 34 (L) 04/23/2020    TIBC 235 (L) 04/23/2020    FERRITIN 275 (H) 04/23/2020     No results found for: SPEP, UPEP  No results found for: PSA, CEA, , OS4425,     Input/Output:    Intake/Output Summary (Last 24 hours) at 3/21/2022 1057  Last data filed at 3/21/2022 0800  Gross per 24 hour   Intake 360 ml   Output 1150 ml   Net -790 ml       Microbiology:  Recent Labs     03/18/22  1404   SPECDESC . NASOPHARYNGEAL SWAB       Pathology:    Radiology reports:  FL MODIFIED BARIUM SWALLOW W VIDEO   Final Result   No penetration or aspiration with the above administered substances.       Please see separate speech pathology report for full discussion of findings   and recommendations. XR CHEST PORTABLE   Final Result   Findings suggestive of worsening bibasilar pneumonia. CT CHEST PULMONARY EMBOLISM W CONTRAST   Final Result   No evidence of pulmonary embolism. There is patchy ill-defined opacification   lower lung fields bilaterally suggesting infiltrate with ground-glass opacity   concerning for pneumonia as well in the upper lung fields. Reactive   adenopathy throughout the mediastinum and hilar regions. RECOMMENDATIONS:   Unavailable         VL DUP LOWER EXTREMITY VENOUS BILATERAL   Final Result      XR CHEST PORTABLE   Final Result   Stable cardiomegaly with mild pulmonary vascular congestion. Echocardiogram:   Results for orders placed during the hospital encounter of 02/22/19    ECHO Complete 2D W Doppler W Color    Narrative  Transthoracic Echocardiography Report (TTE)    Patient Name PRICE       Date of Study               02/25/2019  Teja Sitter    Date of      1975  Gender                      Female  Birth    Age          37 year(s)  Race                        Black    Room Number  3001        Height:                     60 inch, 152.4 cm    Corporate ID [id number]  Weight:                     412 pounds, 186.9 kg  #    Patient Acct [de-identified]   BSA:          2.54 m^2      BMI:      80.46  #                                                              kg/m^2    MR #         [id number]     Sonographer                 Bambi Ray    Accession #  [id number]   Interpreting Physician      [facility]    Fellow                   Referring Nurse  Practitioner    Interpreting             Referring Physician         Shea Infante    Type of Study    TTE procedure:2D Echocardiogram, M-Mode, Doppler, Color Doppler.     Procedure Date  Date: 02/25/2019 Start: 07:44 AM    Study Location: [facility]  Technical Quality: Poor visualization due to body habitus. History / Tech. Comments:  Procedure explained to patient. Very technically difficult study due to body  habitus. 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    - Sulfa. CONCLUSIONS    Summary  Technically difficult study. Left ventricle is normal in size. Mild concentric LVH Global left  ventricular systolic function appears hyperdynamic Calculated ejection  fraction is 75% . Mild left ventricular hypertrophy. Moderately dilated right ventricle size and severely decreased systolic  function. Estimated right ventricular systolic pressure is 44 mmHg. Moderate pulmonary hypertension. mildly elevated RA filling pressure . Signature  ----------------------------------------------------------------------------  Electronically signed by Isiah Marie(Interpreting physician) on  02/25/2019 03:15 PM  ----------------------------------------------------------------------------    ----------------------------------------------------------------------------  Electronically signed by Bambi Ray(Sonographer) on 02/25/2019  09:28 AM  ----------------------------------------------------------------------------  FINDINGS  Left Atrium  Left atrium is normal in size. Left Ventricle  Technically difficult study. Left ventricle is normal in size. Mld  concentric LVH Global left ventricular systolic function appears  hyperdynamic Calculated ejection fraction is 75% . Mild left ventricular hypertrophy. Right Atrium  Normal right atrial dimension. Right Ventricle  Moderately dilated right ventricle size and severely decreased systolic  function. Mitral Valve  Normal mitral valve structure. Trivial mitral regurgitation. No mitral stenosis. Aortic Valve  Aortic valve structure and function normal.  Aortic valve is trileaflet.   No aortic and Critical Care Medicine           3/21/2022, 10:57 AM    Please note that this chart was generated using voice recognition Dragon dictation software. Although every effort was made to ensure the accuracy of this automated transcription, some errors in transcription may have occurred.

## 2022-03-21 NOTE — PROGRESS NOTES
Physical Therapy  Facility/Department: [facility] CAR 2  Daily Treatment Note  NAME: Hailey Gonzalez  : 1975  MRN: [id number]    Date of Service: 3/21/2022    Discharge Recommendations:  Patient would benefit from continued therapy after discharge        Assessment   Body structures, Functions, Activity limitations: Decreased functional mobility ; Decreased ADL status; Decreased strength;Decreased endurance;Decreased balance;Decreased fine motor control;Decreased ROM  Assessment: Pt. adamently refused attempts to perform bed mobility or transfers this date due to continued fatigued following OT evaluation this morning. However, she was able to complete strengthening exercises from supine in bed, and demonstrated good return demo of prescribed HEP. Pt. was limited in ability to perform therapeutic activities this date due to fluctuating SPO2, dropping intermittently below 90%. SPO2 recovered consistently to >90% with diaphragmatic breathing and supine rest breaks. Pt. will continue to benefit from skilled PT in order to improve functional mobility needed to return to prior living arrangment with safety. Specific instructions for Next Treatment: encourage sitting at EOB or up to chair as tolerated  PT Education: Goals;PT Role;Plan of Care;Home Exercise Program;Precautions; Energy Conservation  Patient Education: Pt provided verbal, written and practical instruction in supine ROM exercises to be completed on own daily to prevent further functional decline in LE abilty and functional mobilty  Activity Tolerance  Activity Tolerance: Patient limited by endurance; Other (Drop in SPO2)  Activity Tolerance: limited by fluctuation in SPO2 with increased activity. Lowest observed SPO2 at 85%. Returned consistently to >92% with 2 minutes supine rest and diaphragmatic breathing. Patient Diagnosis(es): The encounter diagnosis was Dyspnea, unspecified type.      has a past medical history of Acute on chronic diastolic CHF (congestive heart failure) (HCC), HIEN (acute kidney injury) ([facility] [address] HIEN (acute kidney injury) ([facility] [address] Asthma, CHF, Chronic obstructive lung disease ([facility] [address] Chronic respiratory failure with hypoxia ([facility] [address] COPD, Diabetes mellitus, new onset ([facility] [address] Former smoker, HTN, Hyperglycemia, Hyperlipidemia, Hypertension, Morbid obesity with BMI of 60.0-69.9, adult ([facility] [address] Neuromuscular disorder ([facility] [address] STEPH on CPAP, Pedal edema, Pneumonia, Pulmonary hypertension, moderate to severe ([facility] [address] Torn meniscus, and Wears glasses. has a past surgical history that includes  section (); Abdomen surgery; joint replacement; Cystoscopy (2019); Cystoscopy (N/A, 2019); hc cath power picc triple (2018); pr office/outpt visit,procedure only (N/A, 2018); Tracheotomy (2018); Gastrostomy tube placement (2018); and tracheostomy closure (2018). Restrictions  Restrictions/Precautions  Restrictions/Precautions: General Precautions,Up as Tolerated,Fall Risk,Contact Precautions  Required Braces or Orthoses?: No  Position Activity Restriction  Other position/activity restrictions: minimally ambulatory at baseline  Subjective   General  Chart Reviewed: Yes  Additional Pertinent Hx: Pneumonia, Hypertension Congestive heart failure, COPD-  Response To Previous Treatment: Patient with no complaints from previous session. Family / Caregiver Present: No  Subjective  Subjective: Pt supine in bed on arrival with [facility] elevated ~45 degrees and saturating at ~90% SPO2. RN and pt agreeable to PT treatment at this time and the pt remained pleasant throughout the session. General Comment  Comments: pt retired to supine in bed with HOB elevated ~45 degrees. on high flow nasal cannula throughout session.   Pain Screening  Patient Currently in Pain: Denies  Vital Signs  Patient Currently in Pain: Denies       Orientation  Orientation  Overall Orientation Status: Within Functional Limits  Cognition      Objective   Bed mobility  Comment: RAMONE bed mobility this date due to repeated verbal comments stating that \"I am just too tapped out and fatigued after therapy this morning\". Pt. was educated on the importance of OOB mobility this date, in order to prevent contractures, bed sores, pressure wounds, and weakness related to hospitalization. Pt verbalized understanding of education but continued to refuse attempting OOB mobility at this time due to continued fatigue from OT evaluation this AM. Pt. did demo ability to assist with scooting up in bed from supine position as seen through pushing through B LE's (into hip/knee extension) as therapists provided maxA x2 for repositioning. Transfers  Stand to sit: Unable to assess  Bed to Chair: Unable to assess  Comment: Pt. demonstrated poor maintainance of oxygenation as seen intermittently throughout session this date. Increased communication by pt as well as increased activity from supine in bed lead to frequent desaturation in SPO2 as seen through drop to ~85-89% SPO2. With supine rest breaks in bed x2 minutes with diaphragmatic breathing, SPO2 returned to >90%. Exercises  Quad Sets: completed x10 each LE, with 3 second isometric quadricep holds. x1 set this date. Knee Short Arc Quad: completed x10 reps R LE, against gravity. completed x10 reps L LE in anti-gravity position due to tight hip external rotators. Ankle Pumps: completed x20 bilaterally, x1 set  Comments: Pt prescribed HEP this date and issued a red resistance band. HEP included: SA punches neutral to gravity, and bicep curls. Pt. demonstrated x2 reps of each exercise on L UE only, with good return demo to show learning. Pt. educated on importance of completing HEP exercises throughout the day as opposed to all at once in order to prevent excessive fatigue and DOMS, while simultaneously preventing further weakness and functional immobility.  Pt. verbalized understanding, but will benefit from reassessment of return demo of HEP. Other exercises  Other exercises?: No                          AM-PAC Score  AM-PAC Inpatient Mobility Raw Score : 13 (03/21/22 1037)  AM-PAC Inpatient T-Scale Score : 36.74 (03/21/22 1037)  Mobility Inpatient CMS 0-100% Score: 64.91 (03/21/22 1037)  Mobility Inpatient CMS G-Code Modifier : CL (03/21/22 1037)          Goals  Short term goals  Time Frame for Short term goals: 14 visits  Short term goal 1: Pt to demonstrate supine <> sit with Min A  Short term goal 2: Pt to be independent with HEP for bilateral LE ROM  Short term goal 3: pt to sit at EOB with Spo2 > 90% with good pt tolerance. Patient Goals   Patient goals : to improve strength    Plan    Plan  Times per week: 5-6x/wk  Specific instructions for Next Treatment: encourage sitting at EOB or up to chair as tolerated  Current Treatment Recommendations: Strengthening,Transfer Training,Endurance Training,Balance Training,Functional Mobility Training  Plan Comment: Pt in agreement with PT POC, pt apprensive with mobilty and report assist with all mobiltiy at home prior to admit. Safety Devices  Type of devices: All fall risk precautions in place,Call light within reach,Left in bed,Nurse notified,Patient at risk for falls  Restraints  Initially in place: No     Therapy Time   Individual Concurrent Group Co-treatment   Time In 1003         Time Out 1031         Minutes 28         Timed Code Treatment Minutes: [address] [facility], [facility]  This treatment/evaluation completed by signing SPT. Signing PT agrees with treatment and documentation.

## 2022-03-22 NOTE — PLAN OF CARE
BRONCHOSPASM/BRONCHOCONSTRICTION     [x]         IMPROVE AERATION/BREATH SOUNDS  [x]   ADMINISTER BRONCHODILATOR THERAPY AS APPROPRIATE  [x]   ASSESS BREATH SOUNDS  []   IMPLEMENT AEROSOL/MDI PROTOCOL  [x]   PATIENT EDUCATION AS NEEDED       PROVIDE ADEQUATE OXYGENATION WITH ACCEPTABLE SP02/ABG'S    [x]  IDENTIFY APPROPRIATE OXYGEN THERAPY  [x]   MONITOR SP02/ABG'S AS NEEDED   [x]   PATIENT EDUCATION AS NEEDED

## 2022-03-22 NOTE — PROGRESS NOTES
[facility]  Internal Medicine Teaching Residency Program  Inpatient Daily Progress Note  ______________________________________________________________________________    Patient: Jodie Toth  YOB: 1975   PDB:[id number]    Acct: [de-identified]     Room: 2008/2008-01  Admit date: 3/18/2022  Today's date: 03/22/22  Number of days in the hospital: 4    SUBJECTIVE   Admitting Diagnosis: Shortness of breath  CC: Shortness of Breath (pt to er c/o shortness of breath, on home oxygen )  Pt examined at bedside. Chart & results reviewed. No acute episodes overnight  Patient is hemodynamically stable and afebrile  Saturating well on high flow 25 liters, fio2 60%, improving slowly.   On IV bumex today  Used Bipap sparingly over night  AM CBC and BMP reviewed  Potassium replaced  Able to tolerate oral meds per swallow study  Urine output 1.6 liters over last 24 hours    ROS:  General: negative for chills, fatigue or fever  ENT: negative for headaches, nasal congestion, sore throat or visual changes  Allergy and Immunology: negative for postnasal drip or seasonal allergies  Hematological and Lymphatic: negative for bleeding problems, swollen lymph nodes  Respiratory: Positive for shortness of breath, cough and chest pain  Cardiovascular: negative for edema or palpitations  Gastrointestinal: negative for abdominal pain, change in bowel habits or nausea/vomiting  Genito-Urinary: negative for dysuria or urinary frequency/urgency  Musculoskeletal: negative for joint pain or joint swelling  Neurological: negative for numbness/tingling, seizures or weakness  Dermatological: negative for pruritus or rash    BRIEF HISTORY     The patient is a pleasant 46 y.o. female presents with a chief complaint of shortness of breath.  Past medical history significant for CHF, HIEN, COPD on 3-4 L of oxygen, asthma, hyperlipidemia, hypertension.  Patient states that she usually uses 3 to 4 L of oxygen.  Patient was brought in through the EMS when she became significantly short of breath this morning and her saturation dropped to low 70s and high 60s. .  According to the EMS, patient's oxygen tubing was kinked and she was saturating at 70%.  This improved with new tubing and patient's oxygen saturation improved to 90%.  Patient states that EMS placed her on a nonrebreather mask.     Patient states that she experiences chest pain occasionally- the pain is substernal, is at rest and sometimes pain decreases with changing position.  Patient states that the pain is sudden in onset describes the character as a pressure-like sensation, it radiates to the back. OBJECTIVE     Vital Signs:  /67   Pulse 95   Temp 97.7 °F (36.5 °C) (Oral)   Resp 18   Ht 5' 6\" (1.676 m)   Wt (!) 355 lb (161 kg)   SpO2 91%   BMI 57.30 kg/m²     Temp (24hrs), Av.4 °F (36.9 °C), Min:97.7 °F (36.5 °C), Max:99.5 °F (37.5 °C)    In: 1020   Out: -     Physical Exam:  Constitutional: This is a well developed, well nourished, Greater than 40 - Morbid Obesity / Extreme Obesity / Grade III 55y.o. year old female who is alert, oriented, cooperative and in no apparent distress. Head:normocephalic and atraumatic. EENT:  PERRLA. No conjunctival injections. Septum was midline, mucosa was without erythema, exudates or cobblestoning. No thrush was noted. Neck: Supple without thyromegaly. No elevated JVP. Trachea was midline. Respiratory: Chest was symmetrical without dullness to percussion. Breath sounds bilaterally were clear to auscultation. There were no wheezes, rhonchi or rales. There is no intercostal retraction or use of accessory muscles. Cardiovascular: Regular without murmur, clicks, gallops or rubs. Abdomen: Slightly rounded and soft. No rebound, rigidity or guarding was appreciated. Lymphatic: No lymphadenopathy. Musculoskeletal: Normal curvature of the spine. No gross muscle weakness. Extremities:  No lower extremity edema, ulcerations, tenderness, varicosities or erythema. Muscle size, tone and strength are normal.  No involuntary movements are noted. Skin:  Warm and dry. Good color, turgor and pigmentation. No lesions or scars.  No cyanosis or clubbing  Neurological/Psychiatric: The patient's general behavior, level of consciousness, thought content and emotional status is normal.      Medications:  Scheduled Medications:    bumetanide  2 mg IntraVENous BID    magnesium hydroxide  30 mL Oral Daily    predniSONE  40 mg Oral Daily    docusate sodium  100 mg Oral Daily    levofloxacin  750 mg IntraVENous Q24H    hydrALAZINE  25 mg Oral 3 times per day    [Held by provider] bumetanide  2 mg Oral BID    sodium chloride flush  5-40 mL IntraVENous 2 times per day    enoxaparin  30 mg SubCUTAneous BID    amLODIPine  5 mg Oral Daily    atorvastatin  40 mg Oral Nightly    budesonide-formoterol  2 puff Inhalation BID    fluticasone  1 spray Nasal Daily    folic acid  1 mg Oral Daily    gabapentin  300 mg Oral TID    isosorbide dinitrate  20 mg Oral TID    pantoprazole  40 mg Oral QAM AC    sertraline  100 mg Oral Daily    spironolactone  25 mg Oral Daily    tiotropium  2 puff Inhalation Daily    guaiFENesin  600 mg Oral BID     Continuous Infusions:    dextrose      sodium chloride 25 mL (03/20/22 0840)     PRN MedicationsLORazepam, 1 mg, Q4H PRN  glucose, 15 g, PRN  dextrose, 12.5 g, PRN  glucagon (rDNA), 1 mg, PRN  dextrose, 100 mL/hr, PRN  sodium chloride flush, 5-40 mL, PRN  sodium chloride, 25 mL, PRN  ondansetron, 4 mg, Q8H PRN   Or  ondansetron, 4 mg, Q6H PRN  polyethylene glycol, 17 g, Daily PRN  acetaminophen, 650 mg, Q6H PRN   Or  acetaminophen, 650 mg, Q6H PRN  oxyCODONE-acetaminophen, 2 tablet, Q6H PRN  sodium chloride, 1 spray, PRN  albuterol sulfate HFA, 2 puff, Q6H PRN      Diagnostic Labs:  CBC:   Recent Labs     03/20/22  0626 03/21/22  0607 03/22/22  0773 WBC 8.0 9.1 7.0   RBC 3.53* 3.88* 3.58*   HGB 8.9* 10.0* 9.2*   HCT 32.7* 33.8* 32.4*   MCV 92.6 87.1 90.5   RDW 15.0* 15.1* 15.4*    See Reflexed IPF Result 227     BMP:   Recent Labs     03/20/22  0626 03/21/22  0607 03/22/22  0653    137 138   K 3.4* 3.9 3.9   CL 91* 92* 92*   CO2 37* 33* 33*   BUN 14 14 19   CREATININE 0.82 0.99* 1.04*     BNP: No results for input(s): BNP in the last 72 hours. PT/INR: No results for input(s): PROTIME, INR in the last 72 hours. APTT: No results for input(s): APTT in the last 72 hours. CARDIAC ENZYMES: No results for input(s): CKMB, CKMBINDEX, TROPONINI in the last 72 hours. Invalid input(s): CKTOTAL;3  FASTING LIPID PANEL:  Lab Results   Component Value Date    CHOL 144 11/17/2018    HDL 42 10/07/2020    TRIG 68 11/17/2018     LIVER PROFILE: No results for input(s): AST, ALT, ALB, BILIDIR, BILITOT, ALKPHOS in the last 72 hours. MICROBIOLOGY:   Lab Results   Component Value Date/Time    CULTURE NO GROWTH 6 DAYS 06/29/2021 07:03 PM     Imaging:    XR CHEST PORTABLE    Result Date: 3/19/2022  Findings suggestive of worsening bibasilar pneumonia. XR CHEST PORTABLE    Result Date: 3/18/2022  Stable cardiomegaly with mild pulmonary vascular congestion. CT CHEST PULMONARY EMBOLISM W CONTRAST    Result Date: 3/18/2022  No evidence of pulmonary embolism. There is patchy ill-defined opacification lower lung fields bilaterally suggesting infiltrate with ground-glass opacity concerning for pneumonia as well in the upper lung fields. Reactive adenopathy throughout the mediastinum and hilar regions. RECOMMENDATIONS: Unavailable     FL MODIFIED BARIUM SWALLOW W VIDEO    Result Date: 3/19/2022  No penetration or aspiration with the above administered substances. Please see separate speech pathology report for full discussion of findings and recommendations. ASSESSMENT & PLAN     ASSESSMENT / PLAN:     Pneumonia  Improved.  Levofloxacin 750 mg intravenous every 24 hours. Plan to transition to oral today. COVID PCR and respiratory panel negative. cxr today. Acute on Chronic CHF: Home Isordil 20 mg TID and Aldactone 25 Daily restarted. Switched to IV Bumex  2 mg BID. Echo ordered and pending. COPD Exacerbation:  Pulmonology following. Recommendations appreciated. Continue Albuterol Inhaler, Symbicort and Spiriva. Started on PO Prednisone 40 daily for 5 days. Trilogy Vent recommended on discharge. fio2 requirements coming down    Hypertension: Norvasc 5 mg daily. Hyperlipidemia: Lipitor 40 mg daily. DVT ppx: Lovenox  GI ppx: Protonix     PT/OT/SW: On Board  Discharge Planning: Ongoing, pending echo and resolution of symptoms    Pepito Ramirez MD  Internal Medicine Resident, PGY-2  [facility];  [place], [place]  3/22/2022, 11:35 AM

## 2022-03-22 NOTE — PROGRESS NOTES
Pt being discharged home with family. Home oxygen dropped off. All belongings accounted for. Discharge instructions reviewed. All questions answered.

## 2022-03-22 NOTE — CARE COORDINATION
Met with pt to discuss transitional planning. Her goal is to return home with [facility]. She does not want to go to an LTACH. If she has no other choice, she would prefer [facility]. She has Inogen concentrator at home that goes up to 5L. The 5L setting broke, so it only goes up to 4L.  Pt may need Inogen that goes up to 10L

## 2022-03-22 NOTE — PROGRESS NOTES
PULMONARY & CRITICAL CARE MEDICINE PROGRESS  NOTE     Patient:  Deejay Gomez  MRN: [id number]  Admit date: 3/18/2022  Primary Care Physician: Toñito Smith DO  Consulting Physician: Gisela Wheeler MD  CODE Status: Full Code  LOS: 4    SUBJECTIVE     I personally interviewed/examined the patient, reviewed interval history and interpreted all available radiographic, laboratory data at the time of service. Chief Compliant/Reason for Initial Consult:   Acute on chronic hypoxic hypercapnic hypercapnic respiratory failure  COPD/CHF exacerbation  Pneumonia/pulmonary edema    Brief Hospital Course: The patient is a 55 y.o. female presents with complaint of shortness of breath, associated with stuffy nose, productive cough, clear sputum. Patient desatted to high 60s and low 70s. According to EMS, patient's oxygen tubing was kinked and was saturating at 70%. Improved with new tubing. She was placed on nonrebreather. Patient also complained of occasional substernal chest pain pressure-like sensation radiating to back at the time of presentation. On presentation, patient afebrile hemodynamically stable saturating at 96% on high flow 90% FiO2 30 L/minute   Pertinent labs: proBNP 1086  WBC 11.5  Procalcitonin 0.25  Negative for COVID-19 testing  VBG: pH 7.26/PCO2 85/PO2 40.6/bicarb 37. Acute on chronic hypercapnic respiratory acidosis with appropriate metabolic compensation  Chest x-ray: Patchy airspace opacity right worse than left. CT PE: Negative for PE. Groundglass opacity present. Reactive adenopathy throughout mediastinum and hilar region. Patchy consolidation right lower lobe. Interval History:  03/22/22  Overnight events noted, chart reviewed, labs seen medications reviewed. Last 24 hours she is afebrile T-max of 98.8. Her respiratory rate is in low 20s no worsening hypoxia or distress was reported.   She remains on high flow nasal cannula is still and weaned down to 60% and 25 L this morning. According to patient she did not use CPAP last night despite extensive discussion yesterday. According to patient she is feeling slightly better she has mild cough denies sputum production denies wheezing or chest pain. Does complain of postnasal drip denies epistaxis. Labs shows creatinine 1.04 bicarbonate 33 WBC 7 hemoglobin 9 point    Review of Systems:  Review of Systems   Constitutional: Positive for fatigue. Negative for fever. HENT: Positive for postnasal drip. Negative for rhinorrhea, sore throat, trouble swallowing and voice change. Eyes: Negative for photophobia and redness. Respiratory: Positive for cough and shortness of breath. Negative for wheezing. Cardiovascular: Negative for chest pain and leg swelling. Gastrointestinal: Negative for abdominal pain, diarrhea and vomiting. Endocrine: Negative for polydipsia, polyphagia and polyuria. Genitourinary: Negative for difficulty urinating, dysuria, frequency and hematuria. Musculoskeletal: Negative. Allergic/Immunologic: Negative. Neurological: Negative for dizziness, syncope, speech difficulty and headaches. Hematological: Negative for adenopathy. Does not bruise/bleed easily. Psychiatric/Behavioral: Negative.         OBJECTIVE     VITAL SIGNS:   LAST-  /67   Pulse 95   Temp 97.7 °F (36.5 °C) (Oral)   Resp 21   Ht 5' 6\" (1.676 m)   Wt (!) 355 lb (161 kg)   SpO2 (!) 88%   BMI 57.30 kg/m²   8-24 HR RANGE-  TEMP Temp  Av.3 °F (36.8 °C)  Min: 97.7 °F (36.5 °C)  Max: 98.8 °F (15.9 °C)   BP Systolic (66UUD), TXP:810 , Min:109 , SQP:873      Diastolic (38WPI), LPV:55, Min:57, Max:74     PULSE Pulse  Av.6  Min: 89  Max: 100   RR Resp  Av.8  Min: 15  Max: 21   O2 SAT SpO2  Av.5 %  Min: 88 %  Max: 95 %   OXYGEN DELIVERY O2 Flow Rate (L/min)  Av L/min  Min: 25 L/min  Max: 25 L/min     Systemic Examination:   Physical Exam  General appearance - looks comfortable and in mildly tachypneic and not in acute distress  Mental status - alert, oriented to person, place, and time  Eyes - pupils equal and reactive, extraocular eye movements intact  Mouth - mucous membranes moist, pharynx normal without lesions, Mallampati 2  Neck -Short thick neck supple, no significant adenopathy  Chest - Chest was symmetrical without dullness to percussion. Air entry is severely reduced and distant bilaterally with no rhonchi and prolonged expiration no expiratory wheezing mild crackles present at bases. There were no wheezes, rhonchi or rales.   There is no intercostal recession or use of accessory muscles  Heart - normal rate, regular rhythm, normal S1, S2, no murmurs, rubs, clicks or gallops  Abdomen - soft, nontender, nondistended, no masses or organomegaly  Neurological - alert, oriented, normal speech, no focal findings or movement disorder noted  Extremities - peripheral pulses normal, positive pedal edema, no clubbing or cyanosis  Skin - normal coloration and turgor, no rashes, no suspicious skin lesions noted     DATA REVIEW     Medications:  Scheduled Meds:   bumetanide  2 mg IntraVENous BID    magnesium hydroxide  30 mL Oral Daily    predniSONE  40 mg Oral Daily    docusate sodium  100 mg Oral Daily    levofloxacin  750 mg IntraVENous Q24H    hydrALAZINE  25 mg Oral 3 times per day    sodium chloride flush  5-40 mL IntraVENous 2 times per day    enoxaparin  30 mg SubCUTAneous BID    amLODIPine  5 mg Oral Daily    atorvastatin  40 mg Oral Nightly    budesonide-formoterol  2 puff Inhalation BID    fluticasone  1 spray Nasal Daily    folic acid  1 mg Oral Daily    gabapentin  300 mg Oral TID    isosorbide dinitrate  20 mg Oral TID    pantoprazole  40 mg Oral QAM AC    sertraline  100 mg Oral Daily    spironolactone  25 mg Oral Daily    tiotropium  2 puff Inhalation Daily    guaiFENesin  600 mg Oral BID     Continuous Infusions:   dextrose  sodium chloride 25 mL (03/20/22 0840)     LABS:-  ABG:   No results for input(s): POCPH, POCPCO2, POCPO2, POCHCO3, TLQB8HCA in the last 72 hours. CBC:   Recent Labs     03/20/22 0626 03/21/22  0607 03/22/22  0653   WBC 8.0 9.1 7.0   HGB 8.9* 10.0* 9.2*   HCT 32.7* 33.8* 32.4*   MCV 92.6 87.1 90.5    See Reflexed IPF Result 227   LYMPHOPCT 15* 3* 12*   RBC 3.53* 3.88* 3.58*   MCH 25.2 25.8 25.7   MCHC 27.2* 29.6 28.4   RDW 15.0* 15.1* 15.4*     BMP:   Recent Labs     03/20/22  0626 03/21/22  0607 03/22/22  0653    137 138   K 3.4* 3.9 3.9   CL 91* 92* 92*   CO2 37* 33* 33*   BUN 14 14 19   CREATININE 0.82 0.99* 1.04*   GLUCOSE 112* 145* 167*     Liver Function Test:   No results for input(s): PROT, LABALBU, ALT, AST, GGT, ALKPHOS, BILITOT in the last 72 hours. Amylase/Lipase:  No results for input(s): AMYLASE, LIPASE in the last 72 hours. Coagulation Profile:   No results for input(s): INR, PROTIME, APTT in the last 72 hours. Cardiac Enzymes:  No results for input(s): CKTOTAL, CKMB, CKMBINDEX, TROPONINI in the last 72 hours. Lactic Acid:  No results found for: LACTA  BNP:   No results found for: BNP  D-Dimer:  Lab Results   Component Value Date    DDIMER 0.86 03/19/2022     Others:   Lab Results   Component Value Date    TSH 2.36 02/23/2019     Lab Results   Component Value Date    LAUREN NEGATIVE 05/07/2019    CRP 8.0 (H) 06/09/2018     No results found for: Andre Scrivener  Lab Results   Component Value Date    IRON 34 (L) 04/23/2020    TIBC 235 (L) 04/23/2020    FERRITIN 275 (H) 04/23/2020     No results found for: SPEP, UPEP  No results found for: PSA, CEA, , NO2841,     Input/Output:    Intake/Output Summary (Last 24 hours) at 3/22/2022 1402  Last data filed at 3/21/2022 1900  Gross per 24 hour   Intake 180 ml   Output    Net 180 ml       Microbiology:  Recent Labs     03/19/22  2330   SPECDESC . NASOPHARYNGEAL SWAB       Pathology:    Radiology reports:  XR CHEST PORTABLE Final Result   Improvement in the the right basal infiltrate. FL MODIFIED BARIUM SWALLOW W VIDEO   Final Result   No penetration or aspiration with the above administered substances. Please see separate speech pathology report for full discussion of findings   and recommendations. XR CHEST PORTABLE   Final Result   Findings suggestive of worsening bibasilar pneumonia. CT CHEST PULMONARY EMBOLISM W CONTRAST   Final Result   No evidence of pulmonary embolism. There is patchy ill-defined opacification   lower lung fields bilaterally suggesting infiltrate with ground-glass opacity   concerning for pneumonia as well in the upper lung fields. Reactive   adenopathy throughout the mediastinum and hilar regions. RECOMMENDATIONS:   Unavailable         VL DUP LOWER EXTREMITY VENOUS BILATERAL   Final Result      XR CHEST PORTABLE   Final Result   Stable cardiomegaly with mild pulmonary vascular congestion.              Echocardiogram:   Results for orders placed during the hospital encounter of 02/22/19    ECHO Complete 2D W Doppler W Color    Narrative  Transthoracic Echocardiography Report (TTE)    Patient Name PRICE       Date of Study               02/25/2019  Gearline Robyovana    Date of      1975  Gender                      Female  Birth    Age          37 year(s)  Race                        Black    Room Number  3001        Height:                     60 inch, 152.4 cm    Corporate ID [id number]  Weight:                     412 pounds, 186.9 kg  #    Patient Acct [de-identified]   BSA:          2.54 m^2      BMI:      80.46  #                                                              kg/m^2    MR #         [id number]     Serafin Valverde    Accession #  [id number]   Interpreting Physician      [facility]    Fellow                   Referring Nurse  Practitioner    Interpreting             Referring Physician         Shea Estrada    Type of Study    TTE procedure:2D Echocardiogram, M-Mode, Doppler, Color Doppler. Procedure Date  Date: 02/25/2019 Start: 07:44 AM    Study Location: [facility]  Technical Quality: Poor visualization due to body habitus. History / Tech. Comments:  Procedure explained to patient. Very technically difficult study due to body  habitus. 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    - Sulfa. CONCLUSIONS    Summary  Technically difficult study. Left ventricle is normal in size. Mild concentric LVH Global left  ventricular systolic function appears hyperdynamic Calculated ejection  fraction is 75% . Mild left ventricular hypertrophy. Moderately dilated right ventricle size and severely decreased systolic  function. Estimated right ventricular systolic pressure is 44 mmHg. Moderate pulmonary hypertension. mildly elevated RA filling pressure . Signature  ----------------------------------------------------------------------------  Electronically signed by Isiah Marie(Interpreting physician) on  02/25/2019 03:15 PM  ----------------------------------------------------------------------------    ----------------------------------------------------------------------------  Electronically signed by Bambi Ray(Sonographer) on 02/25/2019  09:28 AM  ----------------------------------------------------------------------------  FINDINGS  Left Atrium  Left atrium is normal in size. Left Ventricle  Technically difficult study. Left ventricle is normal in size. Mld  concentric LVH Global left ventricular systolic function appears  hyperdynamic Calculated ejection fraction is 75% . Mild left ventricular hypertrophy. Right Atrium  Normal right atrial dimension.   Right Ventricle  Moderately dilated right ventricle size and severely decreased systolic  function. Mitral Valve  Normal mitral valve structure. Trivial mitral regurgitation. No mitral stenosis. Aortic Valve  Aortic valve structure and function normal.  Aortic valve is trileaflet. No aortic insufficiency. No aortic stenosis. Tricuspid Valve  Normal tricuspid valve leaflets. Trivial tricuspid regurgitation. No tricuspid stenosis. Estimated right ventricular systolic pressure is 44 mmHg. Moderate pulmonary hypertension. Pulmonic Valve  Pulmonic valve is grossly normal in structure and function. No pulmonic insufficiency seen  Pericardial Effusion  No pericardial effusion seen. Miscellaneous  Normal aortic root dimension. E/E'' = 10.75. IVC normal diameter & with impaired inspiratory collapse indicating mildly  elevated RA filling pressure . Subcostal views are not well visualized.     M-mode / 2D Measurements & Calculations:    LVIDd:4.6 cm(3.7 - 5.6 cm)       Diastolic YCTQNP:25.0 ml  JNLGK:3.2 cm(2.2 - 4.0 cm)       Systolic MNTMTS:52.4 ml  OKVA:9.0 cm(0.6 - 1.1 cm)        Aortic Root:2.5 cm(2.0 - 3.7 cm)  LVPWd:1.2 cm(0.6 - 1.1 cm)       LA Dimension: 3.5 cm(1.9 - 4.0 cm)  Fractional Shortenin.96 %    LA volume/Index: 67.93 ml /27m^2  Calculated LVEF (%): 74.92 %     LVOT:1.8 cm  RVDd:4 cm    Mitral:                                 Aortic    Valve Area (P1/2-Time): 2.16 cm^2       Peak Velocity: 1.84 m/s  Peak E-Wave: 0.99 m/s                   Mean Velocity: 1.24 m/s  Peak A-Wave: 0.68 m/s                   Peak Gradient: 13.54 mmHg  E/A Ratio: 1.44                         Mean Gradient: 7 mmHg  Peak Gradient: 3.88 mmHg  Mean Gradient: 2 mmHg  Deceleration Time: 310 msec             Area (continuity): 1.71 cm^2  P1/2t: 102 msec                         AV VTI: 41.7 cm    Area (continuity): 1.91 cm^2  Mean Velocity: 0.58 m/s    Tricuspid:                              Pulmonic:    Estimated RVSP: 44 mmHg                 Peak Velocity: 1.41 m/s  Peak TR Velocity: 2.71 m/s Peak Gradient: 7.95 mmHg  Peak TR Gradient: 29.3764 mmHg  Estimated RA Pressure: 15 mmHg    Estimated PASP: 44.38 mmHg    Diastology / Tissue Doppler  Septal Wall E' velocity:0.10 m/s  Septal Wall E/E':10.2  Lateral Wall E' velocity:0.09 m/s  Lateral Wall E/E':11.3      Cardiac Catheterization:   No results found for this or any previous visit. ASSESSMENT AND PLAN     Assessment:    //Acute on chronic hypoxic hypercapnic respiratory failure  //Right lower lobe pneumonia  //COPD exacerbation  //Bilateral groundglass infiltrate possibly atypical infection versus pulmonary edema  //Obesity hypoventilation syndrome  //Obstructive sleep apnea noncompliant with CPAP/BiPAP  //Chronic diastolic CHF last echo 6949  //Pulmonary hypertension on echo 2019  //Hypertension  //Morbid obesity. Plan:    Patient currently and continues require high flow nasal cannula currently 60 % on 25 L  Encourage and discussed with patient to use nocturnal BiPAP. BiPAP while during naps during the daytime and as needed during the daytime and use every night. Wean FiO2 on high flow nasal cannula to keep saturation above 88%. Continue with Symbicort and DuoNeb aerosol  Encourage incentive spirometry, pulmonary toilet, aspiration precautions and ambulation out of bed to chair  Currently on Bumex 2 mg twice daily would recommend monitoring of electrolytes, renal function management per primary service. Continue to monitor I/O with a goal of negative fluid balance  Antimicrobials reviewed; continue with levofloxacin  Continue prednisone at current dose  DVT prophylaxis on Lovenox 30 mg twice daily  Physical/occupational/speech therapy; increase activity as tolerated    Discussed with nursing staff, treatment and plan discussed  I updated the patient regarding the current clinical condition, provisional diagnosis and management plan. I addressed concerns and answered all questions to the best of my abilities.     It was my pleasure to evaluate Yetta Fall today. We will continue to follow. I would like to thank you for allowing me to participate in the care of this patient. Please feel free to call with any further questions or concerns. Sharmila Chino MD.   Pulmonary and Critical Care Medicine           3/22/2022, 2:02 PM    Please note that this chart was generated using voice recognition Dragon dictation software. Although every effort was made to ensure the accuracy of this automated transcription, some errors in transcription may have occurred.

## 2022-03-22 NOTE — PLAN OF CARE
Problem: Respiratory  Intervention: Respiratory assessment  Note:   PROVIDE ADEQUATE OXYGENATION WITH ACCEPTABLE SP02/ABG'S    [x]  IDENTIFY APPROPRIATE OXYGEN THERAPY  [x]   MONITOR SP02/ABG'S AS NEEDED   [x]   PATIENT EDUCATION AS NEEDED     BRONCHOSPASM/BRONCHOCONSTRICTION     [x]         IMPROVE AERATION/BREATH SOUNDS  [x]   ADMINISTER BRONCHODILATOR THERAPY AS APPROPRIATE  [x]   ASSESS BREATH SOUNDS  []   IMPLEMENT AEROSOL/MDI PROTOCOL  [x]   PATIENT EDUCATION AS NEEDED    [x]  NON INVASIVE VENTILATION  [x]  PROVIDE OPTIMAL VENTILATION/ACCEPTABLE SP02  []  IMPLEMENT NON INVASIVE VENTILATION PROTOCOL  [x]  ASSESSMENT SKIN INTEGRITY  [x]  PATIENT EDUCATION AS NEEDED  [x]  BIPAP AS NEEDED

## 2022-03-23 NOTE — PLAN OF CARE
Problem: OXYGENATION/RESPIRATORY FUNCTION  Goal: Patient will achieve/maintain normal respiratory rate/effort  Description: Respiratory rate and effort will be within normal limits for the patient  Outcome: Ongoing     Problem: OXYGENATION/RESPIRATORY FUNCTION  Goal: Patient will maintain patent airway  Outcome: Ongoing   BRONCHOSPASM/BRONCHOCONSTRICTION   [x]  IMPROVE AERATION/BREATH SOUNDS  [x]   ADMINISTER BRONCHODILATOR THERAPY AS APPROPRIATE  [x]   ASSESS BREATH SOUNDS  []   IMPLEMENT AEROSOL/MDI PROTOCOL  [x]   PATIENT EDUCATION AS NEEDED

## 2022-03-23 NOTE — PROGRESS NOTES
Hyperglycemia, Hyperlipidemia, Hypertension, Morbid obesity with BMI of 60.0-69.9, adult (HCC), Neuromuscular disorder ([address] STEPH on CPAP, Pedal edema, Pneumonia, Pulmonary hypertension, moderate to severe ([address] Torn meniscus, and Wears glasses. has a past surgical history that includes  section (); Abdomen surgery; joint replacement; Cystoscopy (2019); Cystoscopy (N/A, 2019); hc cath power picc triple (2018); pr office/outpt visit,procedure only (N/A, 2018); Tracheotomy (2018); Gastrostomy tube placement (2018); and tracheostomy closure (2018). Restrictions  Restrictions/Precautions  Restrictions/Precautions: General Precautions,Up as Tolerated,Fall Risk,Contact Precautions  Required Braces or Orthoses?: No  Position Activity Restriction  Other position/activity restrictions: minimally ambulatory at baseline; hi flow  Subjective   General  Response To Previous Treatment: Patient with no complaints from previous session. Family / Caregiver Present: No  Subjective  Subjective: Pt supine in bed and agreeable to therapy, RN agreeable to therapy. Pt pleasant and cooperative throughout today's session. Pain Screening  Patient Currently in Pain: Denies  Vital Signs  Patient Currently in Pain: Denies       Cognition   Cognition  Overall Cognitive Status: WFL  Objective   Bed mobility  Supine to Sit: Maximum assistance  Sit to Supine: Maximum assistance  Scooting: Maximal assistance  Comment: HOB elevated ~30 degrees with use of handrails. Increased time/effort to perform. Pt's SpO2 dropped to 85% following supine<>sit transfer. Transfers  Comment: Unsafe to attempt transfers this date. Pt sliding on bariatric bed, unsafe height to bed height ratio, desats to 85% following bed mobility requiring ~3 minutes of pursed lip breathing to recover to >90%.   Ambulation  Ambulation?: No  Stairs/Curb  Stairs?: No     Balance  Posture: Fair  Sitting - Static: Fair  Sitting -

## 2022-03-23 NOTE — PROGRESS NOTES
PULMONARY & CRITICAL CARE MEDICINE PROGRESS  NOTE     Patient:  Jerod Hernandez  MRN: [id number]  Admit date: 3/18/2022  Primary Care Physician: Oliver Martinez DO  Consulting Physician: Phillip Oscar MD  CODE Status: Full Code  LOS: 5    SUBJECTIVE     I personally interviewed/examined the patient, reviewed interval history and interpreted all available radiographic, laboratory data at the time of service. Chief Compliant/Reason for Initial Consult:   Acute on chronic hypoxic hypercapnic hypercapnic respiratory failure  COPD/CHF exacerbation  Pneumonia/pulmonary edema    Brief Hospital Course: The patient is a 55 y.o. female presents with complaint of shortness of breath, associated with stuffy nose, productive cough, clear sputum. Patient desatted to high 60s and low 70s. According to EMS, patient's oxygen tubing was kinked and was saturating at 70%. Improved with new tubing. She was placed on nonrebreather. Patient also complained of occasional substernal chest pain pressure-like sensation radiating to back at the time of presentation. On presentation, patient afebrile hemodynamically stable saturating at 96% on high flow 90% FiO2 30 L/minute   Pertinent labs: proBNP 1086  WBC 11.5  Procalcitonin 0.25  Negative for COVID-19 testing  VBG: pH 7.26/PCO2 85/PO2 40.6/bicarb 37. Acute on chronic hypercapnic respiratory acidosis with appropriate metabolic compensation  Chest x-ray: Patchy airspace opacity right worse than left. CT PE: Negative for PE. Groundglass opacity present. Reactive adenopathy throughout mediastinum and hilar region. Patchy consolidation right lower lobe. Interval History:  03/23/22  Overnight events noted, chart reviewed, labs seen medications reviewed.   Last 24 hours she is afebrile and hemodynamically stable T-max is 98 last 24 hours  She remains on high flow nasal cannula currently on 50% and 25 L saturating 88% Oral BID     Continuous Infusions:   dextrose      sodium chloride 25 mL (03/20/22 0840)     LABS:-  ABG:   No results for input(s): POCPH, POCPCO2, POCPO2, POCHCO3, BUIV0YMB in the last 72 hours. CBC:   Recent Labs     03/21/22  0607 03/22/22  0653 03/23/22  0640   WBC 9.1 7.0 8.5   HGB 10.0* 9.2* 9.2*   HCT 33.8* 32.4* 32.5*   MCV 87.1 90.5 88.8   PLT See Reflexed IPF Result 227 219   LYMPHOPCT 3* 12* 15*   RBC 3.88* 3.58* 3.66*   MCH 25.8 25.7 25.1*   MCHC 29.6 28.4 28.3*   RDW 15.1* 15.4* 15.4*     BMP:   Recent Labs     03/21/22  0607 03/22/22  0653 03/23/22  0640    138 136   K 3.9 3.9 3.0*   CL 92* 92* 89*   CO2 33* 33* 35*   BUN 14 19 28*   CREATININE 0.99* 1.04* 1.17*   GLUCOSE 145* 167* 153*     Liver Function Test:   No results for input(s): PROT, LABALBU, ALT, AST, GGT, ALKPHOS, BILITOT in the last 72 hours. Amylase/Lipase:  No results for input(s): AMYLASE, LIPASE in the last 72 hours. Coagulation Profile:   No results for input(s): INR, PROTIME, APTT in the last 72 hours. Cardiac Enzymes:  No results for input(s): CKTOTAL, CKMB, CKMBINDEX, TROPONINI in the last 72 hours.   Lactic Acid:  No results found for: LACTA  BNP:   No results found for: BNP  D-Dimer:  Lab Results   Component Value Date    DDIMER 0.86 03/19/2022     Others:   Lab Results   Component Value Date    TSH 2.36 02/23/2019     Lab Results   Component Value Date    LAUREN NEGATIVE 05/07/2019    CRP 8.0 (H) 06/09/2018     No results found for: Kushal Sharp  Lab Results   Component Value Date    IRON 34 (L) 04/23/2020    TIBC 235 (L) 04/23/2020    FERRITIN 275 (H) 04/23/2020     No results found for: SPEP, UPEP  No results found for: PSA, CEA, , EM9690,     Input/Output:    Intake/Output Summary (Last 24 hours) at 3/23/2022 1152  Last data filed at 3/23/2022 0800  Gross per 24 hour   Intake 1300 ml   Output 1725 ml   Net -425 ml       Microbiology:  No results for input(s): SPECDESC, SPECDESC, SPECIAL, CULTURE, CULTURE, STATUS, ORG, CDIFFTOXPCR, CAMPYLOBPCR, SALMONELLAPC, SHIGAPCR, SHIGELLAPCR, MPNEUG, MPNEUM, LACTOQL in the last 72 hours. Pathology:    Radiology reports:  XR CHEST PORTABLE   Final Result   Improvement in the the right basal infiltrate. FL MODIFIED BARIUM SWALLOW W VIDEO   Final Result   No penetration or aspiration with the above administered substances. Please see separate speech pathology report for full discussion of findings   and recommendations. XR CHEST PORTABLE   Final Result   Findings suggestive of worsening bibasilar pneumonia. CT CHEST PULMONARY EMBOLISM W CONTRAST   Final Result   No evidence of pulmonary embolism. There is patchy ill-defined opacification   lower lung fields bilaterally suggesting infiltrate with ground-glass opacity   concerning for pneumonia as well in the upper lung fields. Reactive   adenopathy throughout the mediastinum and hilar regions. RECOMMENDATIONS:   Unavailable         VL DUP LOWER EXTREMITY VENOUS BILATERAL   Final Result      XR CHEST PORTABLE   Final Result   Stable cardiomegaly with mild pulmonary vascular congestion.              Echocardiogram:   Results for orders placed during the hospital encounter of 02/22/19    ECHO Complete 2D W Doppler W Color    Narrative  Transthoracic Echocardiography Report (TTE)    Patient Name PRICE       Date of Study               02/25/2019  Robles Hutchinson    Date of      1975  Gender                      Female  Birth    Age          37 year(s)  Race                        Black    Room Number  3001        Height:                     60 inch, 152.4 cm    Corporate ID [id number]  Weight:                     412 pounds, 186.9 kg  #    Patient Acct [de-identified]   BSA:          2.54 m^2      BMI:      80.46  #                                                              kg/m^2    MR #         [id number]     Enoc Davis    Accession #  [id number]   Interpreting Physician      [facility]    Fellow                   Referring Nurse  Practitioner    Interpreting             Referring Physician         Shea Hendricks    Type of Study    TTE procedure:2D Echocardiogram, M-Mode, Doppler, Color Doppler. Procedure Date  Date: 02/25/2019 Start: 07:44 AM    Study Location: [facility]  Technical Quality: Poor visualization due to body habitus. History / Tech. Comments:  Procedure explained to patient. Very technically difficult study due to body  habitus. 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    - Sulfa. CONCLUSIONS    Summary  Technically difficult study. Left ventricle is normal in size. Mild concentric LVH Global left  ventricular systolic function appears hyperdynamic Calculated ejection  fraction is 75% . Mild left ventricular hypertrophy. Moderately dilated right ventricle size and severely decreased systolic  function. Estimated right ventricular systolic pressure is 44 mmHg. Moderate pulmonary hypertension. mildly elevated RA filling pressure . Signature  ----------------------------------------------------------------------------  Electronically signed by Isiah Marie(Interpreting physician) on  02/25/2019 03:15 PM  ----------------------------------------------------------------------------    ----------------------------------------------------------------------------  Electronically signed by Bambi Ray(Sonographer) on 02/25/2019  09:28 AM  ----------------------------------------------------------------------------  FINDINGS  Left Atrium  Left atrium is normal in size. Left Ventricle  Technically difficult study. Left ventricle is normal in size.  Mld  concentric LVH Global left ventricular systolic function appears  hyperdynamic Calculated ejection fraction is 75% .  Mild left ventricular hypertrophy. Right Atrium  Normal right atrial dimension. Right Ventricle  Moderately dilated right ventricle size and severely decreased systolic  function. Mitral Valve  Normal mitral valve structure. Trivial mitral regurgitation. No mitral stenosis. Aortic Valve  Aortic valve structure and function normal.  Aortic valve is trileaflet. No aortic insufficiency. No aortic stenosis. Tricuspid Valve  Normal tricuspid valve leaflets. Trivial tricuspid regurgitation. No tricuspid stenosis. Estimated right ventricular systolic pressure is 44 mmHg. Moderate pulmonary hypertension. Pulmonic Valve  Pulmonic valve is grossly normal in structure and function. No pulmonic insufficiency seen  Pericardial Effusion  No pericardial effusion seen. Miscellaneous  Normal aortic root dimension. E/E'' = 10.75. IVC normal diameter & with impaired inspiratory collapse indicating mildly  elevated RA filling pressure . Subcostal views are not well visualized.     M-mode / 2D Measurements & Calculations:    LVIDd:4.6 cm(3.7 - 5.6 cm)       Diastolic EIKZHO:74.7 ml  VMUEK:5.3 cm(2.2 - 4.0 cm)       Systolic IXLWUZ:01.6 ml  OBJA:1.7 cm(0.6 - 1.1 cm)        Aortic Root:2.5 cm(2.0 - 3.7 cm)  LVPWd:1.2 cm(0.6 - 1.1 cm)       LA Dimension: 3.5 cm(1.9 - 4.0 cm)  Fractional Shortenin.96 %    LA volume/Index: 67.93 ml /27m^2  Calculated LVEF (%): 74.92 %     LVOT:1.8 cm  RVDd:4 cm    Mitral:                                 Aortic    Valve Area (P1/2-Time): 2.16 cm^2       Peak Velocity: 1.84 m/s  Peak E-Wave: 0.99 m/s                   Mean Velocity: 1.24 m/s  Peak A-Wave: 0.68 m/s                   Peak Gradient: 13.54 mmHg  E/A Ratio: 1.44                         Mean Gradient: 7 mmHg  Peak Gradient: 3.88 mmHg  Mean Gradient: 2 mmHg  Deceleration Time: 310 msec             Area (continuity): 1.71 cm^2  P1/2t: 102 msec                         AV VTI: 41.7 cm    Area (continuity): 1.91 cm^2  Mean Velocity: 0.58 m/s    Tricuspid:                              Pulmonic:    Estimated RVSP: 44 mmHg                 Peak Velocity: 1.41 m/s  Peak TR Velocity: 2.71 m/s              Peak Gradient: 7.95 mmHg  Peak TR Gradient: 29.3764 mmHg  Estimated RA Pressure: 15 mmHg    Estimated PASP: 44.38 mmHg    Diastology / Tissue Doppler  Septal Wall E' velocity:0.10 m/s  Septal Wall E/E':10.2  Lateral Wall E' velocity:0.09 m/s  Lateral Wall E/E':11.3      Cardiac Catheterization:   No results found for this or any previous visit. ASSESSMENT AND PLAN     Assessment:    //Acute on chronic hypoxic hypercapnic respiratory failure  //Right lower lobe pneumonia  //COPD exacerbation  //Bilateral groundglass infiltrate possibly atypical infection versus pulmonary edema  //Obesity hypoventilation syndrome  //Obstructive sleep apnea noncompliant with CPAP/BiPAP  //Chronic diastolic CHF last echo 8708  //Pulmonary hypertension on echo 2019  //Hypertension  //Morbid obesity. Plan:    Patient currently and continues require high flow nasal cannula currently 50 % on 25 L  Encourage and discussed with patient to use nocturnal BiPAP. BiPAP while during naps during the daytime and as needed during the daytime and use every night. Wean FiO2 on high flow nasal cannula to keep saturation above 88%. Continue with Symbicort and DuoNeb aerosol  Encourage incentive spirometry, pulmonary toilet, aspiration precautions and ambulation out of bed to chair  Currently on Bumex 2 mg twice daily would recommend monitoring of electrolytes, renal function management per primary service.   Continue to monitor I/O with a goal of negative fluid balance  Antimicrobials reviewed; continue with levofloxacin  Continue prednisone at current dose short course of 5 days  DVT prophylaxis on Lovenox 30 mg twice daily  Physical/occupational/speech therapy; increase activity as tolerated    Discussed with nursing staff, treatment and plan discussed  I updated the patient regarding the current clinical condition, provisional diagnosis and management plan. I addressed concerns and answered all questions to the best of my abilities. It was my pleasure to evaluate Jose Palafox today. We will continue to follow. I would like to thank you for allowing me to participate in the care of this patient. Please feel free to call with any further questions or concerns. Liana Ndiaye MD.   Pulmonary and Critical Care Medicine           3/23/2022, 11:52 AM    Please note that this chart was generated using voice recognition Dragon dictation software. Although every effort was made to ensure the accuracy of this automated transcription, some errors in transcription may have occurred.

## 2022-03-23 NOTE — PROGRESS NOTES
[facility]  Internal Medicine Teaching Residency Program  Inpatient Daily Progress Note  ______________________________________________________________________________    Patient: Ama Byers  YOB: 1975   VSE:[id number]    Acct: [de-identified]     Room: 2008/2008-01  Admit date: 3/18/2022  Today's date: 03/23/22  Number of days in the hospital: 5    SUBJECTIVE   Admitting Diagnosis: Shortness of breath  CC: Shortness of breath  Pt examined at bedside. Chart & results reviewed. Patient hemodynamically stable, afebrile, currently on on HFNC at 25 L/min 60% fio2  K 3.0; will replaced  Awaiting echo  CXR yesterday - As compared to prior examination, there has been significant improvement in the right lower lobe infiltrate. Lungs appear clear. There is cardiomegaly   noted. Bony structures unremarkable. ROS:  Constitutional:  negative for chills, fevers, sweats  Respiratory:  Shortness of breath present  Cardiovascular:  negative for chest pain, chest pressure/discomfort, lower extremity edema, palpitations  Gastrointestinal:  negative for abdominal pain, constipation, diarrhea, nausea, vomiting  Neurological:  negative for dizziness, headache  BRIEF HISTORY     The patient is a pleasant 46 y.o. female presents with a chief complaint of shortness of breath.  Past medical history significant for CHF, HIEN, COPD on 3-4 L of oxygen, asthma, hyperlipidemia, hypertension.  Patient states that she usually uses 3 to 4 L of oxygen.  Patient was brought in through the EMS when she became significantly short of breath this morning and her saturation dropped to low 70s and high 60s. .  According to the EMS, patient's oxygen tubing was kinked and she was saturating at 70%.  This improved with new tubing and patient's oxygen saturation improved to 90%.  Patient states that EMS placed her on a nonrebreather mask.     Patient states that she experiences chest  enoxaparin  30 mg SubCUTAneous BID    amLODIPine  5 mg Oral Daily    atorvastatin  40 mg Oral Nightly    budesonide-formoterol  2 puff Inhalation BID    fluticasone  1 spray Nasal Daily    folic acid  1 mg Oral Daily    gabapentin  300 mg Oral TID    isosorbide dinitrate  20 mg Oral TID    pantoprazole  40 mg Oral QAM AC    sertraline  100 mg Oral Daily    spironolactone  25 mg Oral Daily    tiotropium  2 puff Inhalation Daily    guaiFENesin  600 mg Oral BID     Continuous Infusions:    dextrose      sodium chloride 25 mL (03/20/22 0840)     PRN MedicationsLORazepam, 1 mg, Q4H PRN  glucose, 15 g, PRN  dextrose, 12.5 g, PRN  glucagon (rDNA), 1 mg, PRN  dextrose, 100 mL/hr, PRN  sodium chloride flush, 5-40 mL, PRN  sodium chloride, 25 mL, PRN  ondansetron, 4 mg, Q8H PRN   Or  ondansetron, 4 mg, Q6H PRN  polyethylene glycol, 17 g, Daily PRN  acetaminophen, 650 mg, Q6H PRN   Or  acetaminophen, 650 mg, Q6H PRN  oxyCODONE-acetaminophen, 2 tablet, Q6H PRN  sodium chloride, 1 spray, PRN  albuterol sulfate HFA, 2 puff, Q6H PRN        Diagnostic Labs:  CBC:   Recent Labs     03/20/22  0626 03/21/22  0607 03/22/22  0653   WBC 8.0 9.1 7.0   RBC 3.53* 3.88* 3.58*   HGB 8.9* 10.0* 9.2*   HCT 32.7* 33.8* 32.4*   MCV 92.6 87.1 90.5   RDW 15.0* 15.1* 15.4*    See Reflexed IPF Result 227     BMP:   Recent Labs     03/20/22  0626 03/21/22  0607 03/22/22  0653    137 138   K 3.4* 3.9 3.9   CL 91* 92* 92*   CO2 37* 33* 33*   BUN 14 14 19   CREATININE 0.82 0.99* 1.04*     BNP: No results for input(s): BNP in the last 72 hours. PT/INR: No results for input(s): PROTIME, INR in the last 72 hours. APTT: No results for input(s): APTT in the last 72 hours. CARDIAC ENZYMES: No results for input(s): CKMB, CKMBINDEX, TROPONINI in the last 72 hours.     Invalid input(s): CKTOTAL;3  FASTING LIPID PANEL:  Lab Results   Component Value Date    CHOL 144 11/17/2018    HDL 42 10/07/2020    TRIG 68 11/17/2018     LIVER PROFILE: No results for input(s): AST, ALT, ALB, BILIDIR, BILITOT, ALKPHOS in the last 72 hours. MICROBIOLOGY:   Lab Results   Component Value Date/Time    CULTURE NO GROWTH 6 DAYS 06/29/2021 07:03 PM       Imaging:    XR CHEST PORTABLE    Result Date: 3/22/2022  Improvement in the the right basal infiltrate. XR CHEST PORTABLE    Result Date: 3/19/2022  Findings suggestive of worsening bibasilar pneumonia. XR CHEST PORTABLE    Result Date: 3/18/2022  Stable cardiomegaly with mild pulmonary vascular congestion. CT CHEST PULMONARY EMBOLISM W CONTRAST    Result Date: 3/18/2022  No evidence of pulmonary embolism. There is patchy ill-defined opacification lower lung fields bilaterally suggesting infiltrate with ground-glass opacity concerning for pneumonia as well in the upper lung fields. Reactive adenopathy throughout the mediastinum and hilar regions. RECOMMENDATIONS: Unavailable     FL MODIFIED BARIUM SWALLOW W VIDEO    Result Date: 3/19/2022  No penetration or aspiration with the above administered substances. Please see separate speech pathology report for full discussion of findings and recommendations. ASSESSMENT & PLAN     ASSESSMENT / PLAN:     Principal Problem:    Shortness of breath  Active Problems:    Pneumonia    HTN (hypertension)    COPD exacerbation (HCC)    Pulmonary hypertension, moderate to severe (HCC)    Morbid obesity with BMI of 50.0-59.9, adult (HCC)    STEPH (obstructive sleep apnea)    Normocytic anemia    Dyspnea    Prediabetes    Hyperlipidemia    Hypokalemia    Hypomagnesemia  Resolved Problems:    * No resolved hospital problems. *      1. Community acquired pneumonia - continue lovaquin 750 q24h last dose 03/24/22 for 5 doses;   2. Acute on chronic CHF; HFpEF EF 75% 02/2019; resumed home isordil 20 mg tid and aldactone 25 mg qd; switched to bumexo po 2 mg bid. Awaiting echo  3. COPD exacerbation - pulmonology on board; continue albuterol, symbicort, spiriva.  Started on predisone 40 mg qd for 5 days. Trilogy vent recommended for discharge. 4. Acute on chronic hypoxic respiratory failure secondary to #2 and #3  5. HTN - resumed norvasc 5 mg qd, hydralazine 25 mg q8h  6. HLD - resumed lipitor 40 mg qd  7. Type 2 DM HbA1C 6.4 03/22- on hypoglycemia protocol  8. MDD - resumed zoloft  9. Morbid obesity      DVT ppx : lovenox  GI ppx: protonix    PT/OT: consulted  Discharge Planning / SW: consulted    Renetta Philippe MD  Internal Medicine Resident, PGY-1  [facility]; [place], [place]  3/23/2022, 6:14 AM  Attending Physician Statement  I have discussed the care of [address] [place] [address] and I have examined the patient myselft and taken ros and hpi , including pertinent history and exam findings,  with the resident. I have reviewed the key elements of all parts of the encounter with the resident. I agree with the assessment, plan and orders as documented by the resident.       Electronically signed by Can Benites MD

## 2022-03-23 NOTE — PLAN OF CARE
Problem: Pain:  Goal: Pain level will decrease  Description: Pain level will decrease  3/23/2022 0610 by Elizabeth Salinas RN  Outcome: Ongoing  3/22/2022 1807 by Gayle Carlson RN  Outcome: Ongoing  Goal: Control of acute pain  Description: Control of acute pain  3/23/2022 0610 by Elizabeth Salinas RN  Outcome: Ongoing  3/22/2022 1807 by Gayle Carlson RN  Outcome: Ongoing  Goal: Control of chronic pain  Description: Control of chronic pain  3/23/2022 0610 by Elizabeth Salinas RN  Outcome: Ongoing  3/22/2022 1807 by Gayle Carlson RN  Outcome: Ongoing

## 2022-03-23 NOTE — PROGRESS NOTES
Occupational Therapy  Facility/Department: [facility] CAR 2  Daily Treatment Note  NAME: Bolivar Joel  : 1975  MRN: [id number]    Date of Service: 3/23/2022    Discharge Recommendations:  Patient would benefit from continued therapy after discharge in order to increase pt endurance, strength and independence. Increased time req throughout session sec to SOB req multiple rest breaks throughout. Assessment   Performance deficits / Impairments: Decreased functional mobility ; Decreased endurance;Decreased ADL status; Decreased balance;Decreased high-level IADLs;Decreased safe awareness  Prognosis: Fair  OT Education: OT Role;Transfer Training;Energy Conservation; ADL Adaptive Strategies  Patient Education: proper hand and foot placement; pursed lip breathing; EC techniques  Barriers to Learning: pt demo F carry over  REQUIRES OT FOLLOW UP: Yes  Activity Tolerance  Activity Tolerance: Patient Tolerated treatment well;Patient limited by fatigue  Activity Tolerance: SOB  Safety Devices  Safety Devices in place: Yes  Type of devices: Left in bed;Call light within reach  Restraints  Initially in place: No         Patient Diagnosis(es): The encounter diagnosis was Dyspnea, unspecified type. has a past medical history of Acute on chronic diastolic CHF (congestive heart failure) ([address] HIEN (acute kidney injury) ([address] HIEN (acute kidney injury) ([address] Asthma, CHF, Chronic obstructive lung disease ([address] Chronic respiratory failure with hypoxia ([address] COPD, Diabetes mellitus, new onset ([address] Former smoker, HTN, Hyperglycemia, Hyperlipidemia, Hypertension, Morbid obesity with BMI of 60.0-69.9, adult ([address] Neuromuscular disorder ([address] STEPH on CPAP, Pedal edema, Pneumonia, Pulmonary hypertension, moderate to severe ([address] Torn meniscus, and Wears glasses. has a past surgical history that includes  section (); Abdomen surgery; joint replacement; Cystoscopy (2019);  Cystoscopy (N/A, 2019); hc cath power picc triple (2/2/2018); pr office/outpt visit,procedure only (N/A, 2/17/2018); Tracheotomy (02/2018); Gastrostomy tube placement (02/2018); and tracheostomy closure (03/2018). Restrictions  Restrictions/Precautions  Restrictions/Precautions: General Precautions,Up as Tolerated,Fall Risk,Contact Precautions  Required Braces or Orthoses?: No  Position Activity Restriction  Other position/activity restrictions: minimally ambulatory at baseline; hi flow  Subjective   General  Chart Reviewed: Yes  Patient assessed for rehabilitation services?: Yes  Family / Caregiver Present: No  Diagnosis: SOB, COPD exac, PNA  General Comment  Comments: Pt pleasant and cooperative  Pain Assessment  Pain Assessment: 0-10  Pain Level: 0  Pre Treatment Pain Screening  Comments / Details: Pt supine in bed at start of session. At session end pt retired to supine in bed with call light in reach and all needs met  Vital Signs  Patient Currently in Pain: No   Orientation     Objective    ADL  Grooming: Stand by assistance;Setup; Increased time to complete (oral care and face washing)  Toileting: Maximum assistance;Setup; Increased time to complete (supine in bed)  Additional Comments: Grooming tasks facilitated with pt supine in bed req SBA and set up with increased time. Throughout tasks pt req increased time and increased rest breaks sec to increased SOB and decreased SPO2. Pt noted bedding and brief notably soiled req Max A for personal hygiene and brief management. Throughout session pt limited per habitus and decreased endurance.         Balance  Sitting Balance:  (RAMONE pt SPO2 decreased to 81% with rolling bed mobility on hi flow)  Bed mobility  Rolling to Left: Minimal assistance  Rolling to Right: Minimal assistance  Comment: multiple rolls completed for toileting and linen change                          Cognition  Overall Cognitive Status: [facility]                                         Plan   Plan  Times per week: 3-5x AM-PAC Score        AM-PAC Inpatient Daily Activity Raw Score: 15 (03/23/22 1557)  AM-PAC Inpatient ADL T-Scale Score : 34.69 (03/23/22 1557)  ADL Inpatient CMS 0-100% Score: 56.46 (03/23/22 1557)  ADL Inpatient CMS G-Code Modifier : CK (03/23/22 1557)    Goals  Short term goals  Time Frame for Short term goals: Pt will by discharge  Short term goal 1: demo ADL UB bathing/dressing activity while sitting unsupported with increased time, setup, CGA  Short term goal 2: demo ADL LB bathing/dressing activity  with increased time, setup, mod A, sock-aid/reacher PRN  Short term goal 3: demo good safety awareness during short func mob at bedside using bariatric RW and SBA only  Short term goal 4: demo CGA for all bed mobility using bed rails PRN  Short term goal 5: demo/identify 2 EC/WS techniques during func activity with 1 cue only       Therapy Time   Individual Concurrent Group Co-treatment   Time In 1417         Time Out 1502         Minutes 45         Timed Code Treatment Minutes: [address], [place]/L

## 2022-03-24 NOTE — PROGRESS NOTES
Nutrition Assessment     Type and Reason for Visit: Initial (LOS)    Nutrition Recommendations/Plan: Modify current diet to provide 4 Carb Choices with each meal.  Encourage/monitor PO intakes as tolerated. Will monitor labs, weights, and plan of care. Nutrition Assessment:  Pt seen/chart reviewed for length of stay. Pt admitted with SOB and difficulty breathing. PMH includes:CHF, COPD, DM, HTN. Pt passed a MBSS on 3/19 for a regular diet with thin liquids. Pt using Bipap and high flow nasal cannula as needed. Pt reports having a good appetite and eating most of her meals. This morning pt ate nearly 100% of her breakfast tray. Per chart review, recorded PO intakes of % of meals. Labs reviewed: Glucose 141-191 mg/dL. Meds reviewed. Malnutrition Assessment:  Malnutrition Status: No malnutrition    Estimated Daily Nutrient Needs:  Energy (kcal): 11-12 kcal/kg = 5699-8946 kcals/day; Weight Used for Energy Requirements:  Current     Protein (g): 1.5 gm/kg = 85 gm pro/day; Weight Used for Protein Requirements:  Ideal        Fluid (ml/day): 1500 mL/day per diet order/MD    Nutrition Related Findings: Labs/Meds reviewed. Last BM 3/22. Current Nutrition Therapies:    ADULT DIET; Regular; 3 carb choices (45 gm/meal); Low Sodium (2 gm); 1500 ml    Anthropometric Measures:  · Height: 5' 6\" (167.6 cm)  · Current Body Wt: 355 lb (161 kg)   · BMI: 57.3    Nutrition Diagnosis:   No nutrition diagnosis at this time     Nutrition Interventions:   Food and/or Nutrient Delivery:  Modify Current Diet (Provide 4 Carb Choices with each meal.)  Nutrition Education/Counseling:  No recommendation at this time   Coordination of Nutrition Care:  Continue to monitor while inpatient    Goals:  Meet % of estimated nutrition needs.        Nutrition Monitoring and Evaluation:   Behavioral-Environmental Outcomes:  None Identified   Food/Nutrient Intake Outcomes:  Food and Nutrient Intake  Physical Signs/Symptoms Outcomes:  Biochemical Data,GI Status,Fluid Status or Edema,Hemodynamic Status,Skin,Weight     Discharge Planning:    Continue current diet     Electronically signed by Mike Pollock RD, YOGESH on 3/24/22 at 12:20 PM EDT    Contact: [phone]

## 2022-03-24 NOTE — PLAN OF CARE
Problem: OXYGENATION/RESPIRATORY FUNCTION  Goal: Patient will maintain patent airway  3/24/2022 1243 by Margarita Jacobson RCP  Outcome: Ongoing     Problem: OXYGENATION/RESPIRATORY FUNCTION  Goal: Patient will achieve/maintain normal respiratory rate/effort  Description: Respiratory rate and effort will be within normal limits for the patient  3/24/2022 1243 by Margarita Jacobson RCP  Outcome: Ongoing     BRONCHOSPASM/BRONCHOCONSTRICTION   [x]  IMPROVE AERATION/BREATH SOUNDS  [x]   ADMINISTER BRONCHODILATOR THERAPY AS APPROPRIATE  [x]   ASSESS BREATH SOUNDS  []   IMPLEMENT AEROSOL/MDI PROTOCOL  [x]   PATIENT EDUCATION AS NEEDED

## 2022-03-24 NOTE — PROGRESS NOTES
Physical Therapy        Physical Therapy Cancel Note      DATE: 3/24/2022    NAME: Niya Bauer  MRN: [id number]   : 1975      Patient not seen this date for Physical Therapy due to:    Patient Declined: Pt stated she wanted to get cleaned up first, declined therapist assisting and standing at this time, therapist stated she would check back as time allows or resume 3/25/22      Electronically signed by Yani Sevilla PTA on 3/24/2022 at 3:32 PM

## 2022-03-24 NOTE — PROGRESS NOTES
[facility]  Internal Medicine Teaching Residency Program  Inpatient Daily Progress Note  ______________________________________________________________________________    Patient: Hailey Gonzalez  YOB: 1975   ZHZ:[id number]    Acct: [de-identified]     Room: 2008/2008-01  Admit date: 3/18/2022  Today's date: 03/24/22  Number of days in the hospital: 6    SUBJECTIVE   Admitting Diagnosis: Shortness of breath  CC: Shortness of Breath  Pt examined at bedside. Chart & results reviewed. No acute episodes overnight  Patient is heme stable and afebrile  Saturating well on 25 liters high flow - FiO2 55% with 93% oxygen saturation  BP well controlled  AM BMP and CBC reviewed  Creatinine improved  Urine output 2.7 liters over last 24 hours  Day 5 of levaquin today  Echo reviewed - severe pulmonary hypertension    ROConstitutional:  negative for chills, fevers, sweats  Respiratory:  Shortness of breath present  Cardiovascular:  negative for chest pain, chest pressure/discomfort, lower extremity edema, palpitations  Gastrointestinal:  negative for abdominal pain, constipation, diarrhea, nausea, vomiting  Neurological:  negative for dizziness, headache    BRIEF HISTORY     The patient is a pleasant 46 y.o. female presents with a chief complaint of shortness of breath.  Past medical history significant for CHF, HIEN, COPD on 3-4 L of oxygen, asthma, hyperlipidemia, hypertension.  Patient states that she usually uses 3 to 4 L of oxygen.  Patient was brought in through the EMS when she became significantly short of breath this morning and her saturation dropped to low 70s and high 60s. .  According to the EMS, patient's oxygen tubing was kinked and she was saturating at 70%.  This improved with new tubing and patient's oxygen saturation improved to 90%.  Patient states that EMS placed her on a nonrebreather mask.     Patient states that she experiences chest pain occasionally- the pain is substernal, is at rest and sometimes pain decreases with changing position.  Patient states that the pain is sudden in onset describes the character as a pressure-like sensation, it radiates to the back    OBJECTIVE     Vital Signs:  /81   Pulse 79   Temp 97.7 °F (36.5 °C) (Axillary)   Resp 19   Ht 5' 6\" (1.676 m)   Wt (!) 355 lb (161 kg)   SpO2 93%   BMI 57.30 kg/m²     Temp (24hrs), Av.9 °F (36.6 °C), Min:96.7 °F (35.9 °C), Max:98.6 °F (37 °C)    In: 1760   Out: 3500 [Urine:3500]    Physical Exam:  Constitutional: This is a well developed, well nourished, Greater than 40 - Morbid Obesity / Extreme Obesity / Grade III 55y.o. year old female who is alert, oriented, cooperative and in no apparent distress. Head:normocephalic and atraumatic. EENT:  PERRLA. No thrush was noted. Neck: Supple without thyromegaly. No elevated JVP. Respiratory: Chest was symmetrical without dullness to percussion. Decreased breath sounds bilaterally. There were no wheezes, rhonchi or rales. Cardiovascular: Regular without murmur, clicks, gallops or rubs. Abdomen: Slightly rounded and soft without organomegaly. No rebound, rigidity or guarding was appreciated. Lymphatic: No lymphadenopathy. Extremities:  No lower extremity edema, ulcerations, varicosities or erythema. Muscle size, tone and strength are normal.  No involuntary movements are noted. Skin:  Warm and dry. Good color, turgor and pigmentation. No lesions or scars.   No cyanosis or clubbing  Neurological/Psychiatric: The patient's general behavior, level of consciousness, thought content and emotional status is normal.        Medications:  Scheduled Medications:    bumetanide  2 mg IntraVENous BID    docusate sodium  100 mg Oral Daily    hydrALAZINE  25 mg Oral 3 times per day    sodium chloride flush  5-40 mL IntraVENous 2 times per day    enoxaparin  30 mg SubCUTAneous BID    amLODIPine  5 mg Oral Daily    atorvastatin  40 mg Oral Nightly    budesonide-formoterol  2 puff Inhalation BID    fluticasone  1 spray Nasal Daily    folic acid  1 mg Oral Daily    gabapentin  300 mg Oral TID    isosorbide dinitrate  20 mg Oral TID    pantoprazole  40 mg Oral QAM AC    sertraline  100 mg Oral Daily    spironolactone  25 mg Oral Daily    tiotropium  2 puff Inhalation Daily    guaiFENesin  600 mg Oral BID     Continuous Infusions:    dextrose      sodium chloride 25 mL (03/20/22 0840)     PRN MedicationsLORazepam, 1 mg, Q4H PRN  glucose, 15 g, PRN  dextrose, 12.5 g, PRN  glucagon (rDNA), 1 mg, PRN  dextrose, 100 mL/hr, PRN  sodium chloride flush, 5-40 mL, PRN  sodium chloride, 25 mL, PRN  ondansetron, 4 mg, Q8H PRN   Or  ondansetron, 4 mg, Q6H PRN  polyethylene glycol, 17 g, Daily PRN  acetaminophen, 650 mg, Q6H PRN   Or  acetaminophen, 650 mg, Q6H PRN  oxyCODONE-acetaminophen, 2 tablet, Q6H PRN  sodium chloride, 1 spray, PRN  albuterol sulfate HFA, 2 puff, Q6H PRN      Diagnostic Labs:  CBC:   Recent Labs     03/22/22  0653 03/23/22  0640 03/24/22  0442   WBC 7.0 8.5 10.0   RBC 3.58* 3.66* 3.69*   HGB 9.2* 9.2* 9.3*   HCT 32.4* 32.5* 32.2*   MCV 90.5 88.8 87.3   RDW 15.4* 15.4* 15.3*    219 247     BMP:   Recent Labs     03/22/22  0653 03/23/22  0640 03/24/22  0442    136 139   K 3.9 3.0* 3.7   CL 92* 89* 93*   CO2 33* 35* 34*   BUN 19 28* 33*   CREATININE 1.04* 1.17* 1.09*     BNP: No results for input(s): BNP in the last 72 hours. PT/INR: No results for input(s): PROTIME, INR in the last 72 hours. APTT: No results for input(s): APTT in the last 72 hours. CARDIAC ENZYMES: No results for input(s): CKMB, CKMBINDEX, TROPONINI in the last 72 hours. Invalid input(s): CKTOTAL;3  FASTING LIPID PANEL:  Lab Results   Component Value Date    CHOL 144 11/17/2018    HDL 42 10/07/2020    TRIG 68 11/17/2018     LIVER PROFILE: No results for input(s): AST, ALT, ALB, BILIDIR, BILITOT, ALKPHOS in the last 72 hours.    MICROBIOLOGY: Lab Results   Component Value Date/Time    CULTURE NO GROWTH 6 DAYS 06/29/2021 07:03 PM     Imaging:    XR CHEST PORTABLE    Result Date: 3/22/2022  Improvement in the the right basal infiltrate. XR CHEST PORTABLE    Result Date: 3/19/2022  Findings suggestive of worsening bibasilar pneumonia. XR CHEST PORTABLE    Result Date: 3/18/2022  Stable cardiomegaly with mild pulmonary vascular congestion. CT CHEST PULMONARY EMBOLISM W CONTRAST    Result Date: 3/18/2022  No evidence of pulmonary embolism. There is patchy ill-defined opacification lower lung fields bilaterally suggesting infiltrate with ground-glass opacity concerning for pneumonia as well in the upper lung fields. Reactive adenopathy throughout the mediastinum and hilar regions. RECOMMENDATIONS: Unavailable     FL MODIFIED BARIUM SWALLOW W VIDEO    Result Date: 3/19/2022  No penetration or aspiration with the above administered substances. Please see separate speech pathology report for full discussion of findings and recommendations. ASSESSMENT & PLAN     ASSESSMENT / PLAN:     1. Community acquired pneumonia - Day 5 Levaquin today  2. Acute on chronic CHF; HFpEF EF 75% 02/2019; resumed home isordil 20 mg tid and aldactone 25 mg qd; Continue IV Bumex 2 mg bid. Echo result indicative of EF of 60% with severe pulmonary hypertension  3. COPD exacerbation - pulmonology on board; continue albuterol, symbicort, spiriva. Started on predisone 40 mg qd for 5 days - Day 5 today. Pulmonology following. Recommendations appreciated. Trilogy vent recommended for discharge. 4. Acute on chronic hypoxic respiratory failure secondary to #2 and #3  5. HTN - Norvasc 5 mg qd, hydralazine 25 mg q8h. Will consider optimization of meds with addition of Lisinopril due to DM   6. HLD - Lipitor 40 mg qd  7. Type 2 DM HbA1C 6.4 03/22- Sugars well controlled. Neither Lantus nor Sliding scale Insulin needed currently. Hypoglycemia protocol. 8.  MDD - Zoloft  9. Morbid obesity - Counseled      DVT ppx: Lovenox  GI ppx: Protonix     PT/OT: On board  Discharge Planning / SW: Ongoing pending ability to wean from high flow oxygen as appropriate.  efforts appreciated. Jay Jay Monteiro MD  Internal Medicine Resident, PGY-2  [address], [place]  3/24/2022, 10:59 AM      I have discussed the care of [address] [place] [address] , including pertinent history and exam findings,    today with the resident. I have seen and examined the patient and the key elements of all parts of the encounter have been performed by me . I agree with the assessment, plan and orders as documented by the resident. Principal Problem:    Shortness of breath  Active Problems:    Pneumonia    HTN (hypertension)    COPD exacerbation (HCC)    Pulmonary hypertension, moderate to severe (HCC)    Morbid obesity with BMI of 50.0-59.9, adult (HCC)    STEPH (obstructive sleep apnea)    Normocytic anemia    Dyspnea    Prediabetes    Hyperlipidemia    Hypokalemia    Hypomagnesemia  Resolved Problems:    * No resolved hospital problems. *        Overall  course ;                                   are improving over time.         Patient, feeling better  Has severe pulmonary hypertension  Has COPD   STEPH   On high Oxygen requirement             Electronically signed by Johnson Galloway MD

## 2022-03-24 NOTE — PROGRESS NOTES
PULMONARY & CRITICAL CARE MEDICINE PROGRESS  NOTE     Patient:  Jerod Hernandez  MRN: [id number]  Admit date: 3/18/2022  Primary Care Physician: Oliver Martinez DO  Consulting Physician: Phillip Oscar MD  CODE Status: Full Code  LOS: 6    SUBJECTIVE     I personally interviewed/examined the patient, reviewed interval history and interpreted all available radiographic, laboratory data at the time of service. Chief Compliant/Reason for Initial Consult:   Acute on chronic hypoxic hypercapnic hypercapnic respiratory failure  COPD/CHF exacerbation  Pneumonia/pulmonary edema    Brief Hospital Course: The patient is a 55 y.o. female presents with complaint of shortness of breath, associated with stuffy nose, productive cough, clear sputum. Patient desatted to high 60s and low 70s. According to EMS, patient's oxygen tubing was kinked and was saturating at 70%. Improved with new tubing. She was placed on nonrebreather. Patient also complained of occasional substernal chest pain pressure-like sensation radiating to back at the time of presentation. On presentation, patient afebrile hemodynamically stable saturating at 96% on high flow 90% FiO2 30 L/minute   Pertinent labs: proBNP 1086  WBC 11.5  Procalcitonin 0.25  Negative for COVID-19 testing  VBG: pH 7.26/PCO2 85/PO2 40.6/bicarb 37. Acute on chronic hypercapnic respiratory acidosis with appropriate metabolic compensation  Chest x-ray: Patchy airspace opacity right worse than left. CT PE: Negative for PE. Groundglass opacity present. Reactive adenopathy throughout mediastinum and hilar region. Patchy consolidation right lower lobe. Interval History:  03/24/22  Overnight events noted chart reviewed, labs and medications reviewed.   She remains on high flow nasal cannula 125 L this morning was 60% currently on 55% she does desaturate but usually remains 88% unable to wean down high flow nasal cannula further. According to patient she is able to cough she is not able to expectorate but feeling better does complain of shortness of breath on activities on the bed. Does not complain of wheezing  She did use BiPAP overnight apparently tolerated better. She is currently on Bumex 2 mg IV twice daily urine output reported to be 2750 last 24 hours. Labs shows creatinine is 1.09 bicarb 34 BUN 33 sodium 139 potassium 3.7 WBC 10 hemoglobin 9.3    Chest x-ray 2022 shows better aeration of right lower lung field right lower lobe infiltrate/atelectasis improved cardiomegaly present  Echocardiogram on 2022 shows severe pulmonary hypertension with estimated RVSP 80 with right ventricular dilatation and volume overload and moderate to severe TR    Review of Systems:  Review of Systems   Constitutional: Positive for fatigue. Negative for fever. HENT: Negative for postnasal drip, rhinorrhea, sore throat, trouble swallowing and voice change. Eyes: Negative for photophobia and redness. Respiratory: Positive for cough and shortness of breath. Negative for wheezing. Cardiovascular: Negative for chest pain and leg swelling. Gastrointestinal: Negative for abdominal pain, diarrhea and vomiting. Endocrine: Negative for polydipsia, polyphagia and polyuria. Genitourinary: Negative for difficulty urinating, dysuria, frequency and hematuria. Musculoskeletal: Negative. Allergic/Immunologic: Negative. Neurological: Negative for dizziness, syncope, speech difficulty and headaches. Hematological: Negative for adenopathy. Does not bruise/bleed easily. Psychiatric/Behavioral: Negative.         OBJECTIVE     VITAL SIGNS:   LAST-  /63   Pulse 83   Temp 97.6 °F (36.4 °C) (Oral)   Resp 19   Ht 5' 6\" (1.676 m)   Wt (!) 355 lb (161 kg)   SpO2 (!) 86%   BMI 57.30 kg/m²   8-24 HR RANGE-  TEMP Temp  Av °F (36.7 °C)  Min: 97.6 °F (36.4 °C)  Max: 98.6 °F (37 °C)   BP Systolic (27ALR), MUX:785 , Min:114 , OCL:426      Diastolic (49QZT), PRM:17, Min:63, Max:83     PULSE Pulse  Av.2  Min: 79  Max: 96   RR Resp  Av.4  Min: 14  Max: 21   O2 SAT SpO2  Av.8 %  Min: 86 %  Max: 94 %   OXYGEN DELIVERY O2 Flow Rate (L/min)  Av L/min  Min: 25 L/min  Max: 25 L/min     Systemic Examination:   Physical Exam  General appearance - looks comfortable and in mildly tachypneic and not in acute distress  Mental status - alert, oriented to person, place, and time  Eyes - pupils equal and reactive, extraocular eye movements intact  Mouth - mucous membranes moist, pharynx normal without lesions, Mallampati 2  Neck -Short thick neck supple, no significant adenopathy  Chest - Chest was symmetrical without dullness to percussion. Air entry is severely reduced and distant bilaterally with no rhonchi and prolonged expiration no expiratory wheezing mild crackles present at bases. There were no wheezes, rhonchi or rales.   There is no intercostal recession or use of accessory muscles  Heart - normal rate, regular rhythm, normal S1, S2, no murmurs, rubs, clicks or gallops  Abdomen - soft, nontender, nondistended, no masses or organomegaly  Neurological - alert, oriented, normal speech, no focal findings or movement disorder noted  Extremities - peripheral pulses normal, positive pedal edema, no clubbing or cyanosis  Skin - normal coloration and turgor, no rashes, no suspicious skin lesions noted     DATA REVIEW     Medications:  Scheduled Meds:   bumetanide  2 mg IntraVENous BID    docusate sodium  100 mg Oral Daily    hydrALAZINE  25 mg Oral 3 times per day    sodium chloride flush  5-40 mL IntraVENous 2 times per day    enoxaparin  30 mg SubCUTAneous BID    amLODIPine  5 mg Oral Daily    atorvastatin  40 mg Oral Nightly    budesonide-formoterol  2 puff Inhalation BID    fluticasone  1 spray Nasal Daily    folic acid  1 mg Oral Daily    gabapentin  300 mg Oral TID    isosorbide dinitrate  20 mg Oral TID  pantoprazole  40 mg Oral QAM AC    sertraline  100 mg Oral Daily    spironolactone  25 mg Oral Daily    tiotropium  2 puff Inhalation Daily    guaiFENesin  600 mg Oral BID     Continuous Infusions:   dextrose      sodium chloride 25 mL (03/20/22 0840)     LABS:-  ABG:   No results for input(s): POCPH, POCPCO2, POCPO2, POCHCO3, PRYB8IUN in the last 72 hours. CBC:   Recent Labs     03/22/22  0653 03/23/22  0640 03/24/22  0442   WBC 7.0 8.5 10.0   HGB 9.2* 9.2* 9.3*   HCT 32.4* 32.5* 32.2*   MCV 90.5 88.8 87.3    219 247   LYMPHOPCT 12* 15* 14*   RBC 3.58* 3.66* 3.69*   MCH 25.7 25.1* 25.2   MCHC 28.4 28.3* 28.9   RDW 15.4* 15.4* 15.3*     BMP:   Recent Labs     03/22/22  0653 03/23/22  0640 03/24/22  0442    136 139   K 3.9 3.0* 3.7   CL 92* 89* 93*   CO2 33* 35* 34*   BUN 19 28* 33*   CREATININE 1.04* 1.17* 1.09*   GLUCOSE 167* 153* 142*     Liver Function Test:   No results for input(s): PROT, LABALBU, ALT, AST, GGT, ALKPHOS, BILITOT in the last 72 hours. Amylase/Lipase:  No results for input(s): AMYLASE, LIPASE in the last 72 hours. Coagulation Profile:   No results for input(s): INR, PROTIME, APTT in the last 72 hours. Cardiac Enzymes:  No results for input(s): CKTOTAL, CKMB, CKMBINDEX, TROPONINI in the last 72 hours.   Lactic Acid:  No results found for: LACTA  BNP:   No results found for: BNP  D-Dimer:  Lab Results   Component Value Date    DDIMER 0.86 03/19/2022     Others:   Lab Results   Component Value Date    TSH 2.36 02/23/2019     Lab Results   Component Value Date    LAUREN NEGATIVE 05/07/2019    CRP 8.0 (H) 06/09/2018     No results found for: Nury Kast  Lab Results   Component Value Date    IRON 34 (L) 04/23/2020    TIBC 235 (L) 04/23/2020    FERRITIN 275 (H) 04/23/2020     No results found for: SPEP, UPEP  No results found for: PSA, CEA, , VN9433,     Input/Output:    Intake/Output Summary (Last 24 hours) at 3/24/2022 1439  Last data filed at 3/24/2022 1200  Gross per 24 hour   Intake 1190 ml   Output 1950 ml   Net -760 ml       Microbiology:  No results for input(s): SPECDESC, SPECDESC, SPECIAL, CULTURE, CULTURE, STATUS, ORG, CDIFFTOXPCR, CAMPYLOBPCR, SALMONELLAPC, SHIGAPCR, SHIGELLAPCR, MPNEUG, MPNEUM, LACTOQL in the last 72 hours. Pathology:    Radiology reports:  XR CHEST PORTABLE   Final Result   Improvement in the the right basal infiltrate. FL MODIFIED BARIUM SWALLOW W VIDEO   Final Result   No penetration or aspiration with the above administered substances. Please see separate speech pathology report for full discussion of findings   and recommendations. XR CHEST PORTABLE   Final Result   Findings suggestive of worsening bibasilar pneumonia. CT CHEST PULMONARY EMBOLISM W CONTRAST   Final Result   No evidence of pulmonary embolism. There is patchy ill-defined opacification   lower lung fields bilaterally suggesting infiltrate with ground-glass opacity   concerning for pneumonia as well in the upper lung fields. Reactive   adenopathy throughout the mediastinum and hilar regions. RECOMMENDATIONS:   Unavailable         VL DUP LOWER EXTREMITY VENOUS BILATERAL   Final Result      XR CHEST PORTABLE   Final Result   Stable cardiomegaly with mild pulmonary vascular congestion.              Echocardiogram:   Results for orders placed during the hospital encounter of 02/22/19    ECHO Complete 2D W Doppler W Color    Narrative  Transthoracic Echocardiography Report (TTE)    Patient Name PRICE       Date of Study               02/25/2019  Romel Arroyo    Date of      1975  Gender                      Female  Birth    Age          37 year(s)  Race                        Black    Room Number  3001        Height:                     60 inch, 152.4 cm    Corporate ID [id number]  Weight:                     412 pounds, 186.9 kg  #    Patient Acct [de-identified]   BSA:          2.54 m^2      BMI:      80.46  # kg/m^2    MR #         [id number]     [facility]    Accession #  [id number]   Interpreting Physician      [facility]    Fellow                   Referring Nurse  Practitioner    Interpreting             Referring Physician         Shea Forde    Type of Study    TTE procedure:2D Echocardiogram, M-Mode, Doppler, Color Doppler. Procedure Date  Date: 02/25/2019 Start: 07:44 AM    Study Location: [facility]  Technical Quality: Poor visualization due to body habitus. History / Tech. Comments:  Procedure explained to patient. Very technically difficult study due to body  habitus. 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    - Sulfa. CONCLUSIONS    Summary  Technically difficult study. Left ventricle is normal in size. Mild concentric LVH Global left  ventricular systolic function appears hyperdynamic Calculated ejection  fraction is 75% . Mild left ventricular hypertrophy. Moderately dilated right ventricle size and severely decreased systolic  function. Estimated right ventricular systolic pressure is 44 mmHg. Moderate pulmonary hypertension. mildly elevated RA filling pressure . Signature  ----------------------------------------------------------------------------  Electronically signed by Isiah Marie(Interpreting physician) on  02/25/2019 03:15 PM  ----------------------------------------------------------------------------    ----------------------------------------------------------------------------  Electronically signed by José Miguel RaySonographer) on 02/25/2019  09:28 AM  ----------------------------------------------------------------------------  FINDINGS  Left Atrium  Left atrium is normal in size. Left Ventricle  Technically difficult study.  Left ventricle is normal in size. Mld  concentric LVH Global left ventricular systolic function appears  hyperdynamic Calculated ejection fraction is 75% . Mild left ventricular hypertrophy. Right Atrium  Normal right atrial dimension. Right Ventricle  Moderately dilated right ventricle size and severely decreased systolic  function. Mitral Valve  Normal mitral valve structure. Trivial mitral regurgitation. No mitral stenosis. Aortic Valve  Aortic valve structure and function normal.  Aortic valve is trileaflet. No aortic insufficiency. No aortic stenosis. Tricuspid Valve  Normal tricuspid valve leaflets. Trivial tricuspid regurgitation. No tricuspid stenosis. Estimated right ventricular systolic pressure is 44 mmHg. Moderate pulmonary hypertension. Pulmonic Valve  Pulmonic valve is grossly normal in structure and function. No pulmonic insufficiency seen  Pericardial Effusion  No pericardial effusion seen. Miscellaneous  Normal aortic root dimension. E/E'' = 10.75. IVC normal diameter & with impaired inspiratory collapse indicating mildly  elevated RA filling pressure . Subcostal views are not well visualized.     M-mode / 2D Measurements & Calculations:    LVIDd:4.6 cm(3.7 - 5.6 cm)       Diastolic QFOOBO:06.1 ml  CHSI.4 cm(2.2 - 4.0 cm)       Systolic PNIBPA:34.9 ml  USTS:4.1 cm(0.6 - 1.1 cm)        Aortic Root:2.5 cm(2.0 - 3.7 cm)  LVPWd:1.2 cm(0.6 - 1.1 cm)       LA Dimension: 3.5 cm(1.9 - 4.0 cm)  Fractional Shortenin.96 %    LA volume/Index: 67.93 ml /27m^2  Calculated LVEF (%): 74.92 %     LVOT:1.8 cm  RVDd:4 cm    Mitral:                                 Aortic    Valve Area (P1/2-Time): 2.16 cm^2       Peak Velocity: 1.84 m/s  Peak E-Wave: 0.99 m/s                   Mean Velocity: 1.24 m/s  Peak A-Wave: 0.68 m/s                   Peak Gradient: 13.54 mmHg  E/A Ratio: 1.44                         Mean Gradient: 7 mmHg  Peak Gradient: 3.88 mmHg  Mean Gradient: 2 mmHg  Deceleration Time: 310 msec             Area (continuity): 1.71 cm^2  P1/2t: 102 msec                         AV VTI: 41.7 cm    Area (continuity): 1.91 cm^2  Mean Velocity: 0.58 m/s    Tricuspid:                              Pulmonic:    Estimated RVSP: 44 mmHg                 Peak Velocity: 1.41 m/s  Peak TR Velocity: 2.71 m/s              Peak Gradient: 7.95 mmHg  Peak TR Gradient: 29.3764 mmHg  Estimated RA Pressure: 15 mmHg    Estimated PASP: 44.38 mmHg    Diastology / Tissue Doppler  Septal Wall E' velocity:0.10 m/s  Septal Wall E/E':10.2  Lateral Wall E' velocity:0.09 m/s  Lateral Wall E/E':11.3      Cardiac Catheterization:   No results found for this or any previous visit. ASSESSMENT AND PLAN     Assessment:    //Acute on chronic hypoxic hypercapnic respiratory failure  //Right lower lobe pneumonia  //COPD exacerbation  //Bilateral groundglass infiltrate possibly atypical infection versus pulmonary edema  //Obesity hypoventilation syndrome  //Obstructive sleep apnea noncompliant with CPAP/BiPAP  //Severe pulmonary hypertension  //Acute on chronic cor pulmonale  //Chronic diastolic CHF last echo 7153  //Hypertension  //Morbid obesity. Plan:    I had a long discussion with patient again today discussed the echo finding I have also discussed with her about severe pulmonary hypertension and right ventricular failure and the need for continued BiPAP/CPAP at home and also compliance with oxygen discussed. I have discussed with her to continue BiPAP every night and every time she is sleeping during the daytime. Patient currently and continues require high flow nasal cannula currently 55 % on 25 L continue to be hypoxic  Wean FiO2 on high flow nasal cannula to keep saturation above 88%.   Continue with Symbicort and Spiriva  Encourage incentive spirometry, pulmonary toilet, aspiration precautions and ambulation out of bed to chair  Currently on Bumex 2 mg twice daily would recommend monitoring of electrolytes, renal function management per primary service. Continue to monitor I/O with a goal of negative fluid balance  Antimicrobials reviewed; finished levofloxacin  Continue prednisone at current dose short course of 5 days  DVT prophylaxis on Lovenox 30 mg twice daily. Physical/occupational/speech therapy; increase activity as tolerated      Discussed with nursing staff, treatment and plan discussed  I updated the patient and son regarding the current clinical condition, provisional diagnosis and management plan. I addressed concerns and answered all questions to the best of my abilities. It was my pleasure to evaluate Shannan Liu today. We will continue to follow. I would like to thank you for allowing me to participate in the care of this patient. Please feel free to call with any further questions or concerns. Florence Gregory MD.   Pulmonary and Critical Care Medicine           3/24/2022, 2:39 PM    Please note that this chart was generated using voice recognition Dragon dictation software. Although every effort was made to ensure the accuracy of this automated transcription, some errors in transcription may have occurred.

## 2022-03-24 NOTE — PLAN OF CARE
Problem: Pain:  Goal: Pain level will decrease  Description: Pain level will decrease  Outcome: Ongoing  Goal: Control of acute pain  Description: Control of acute pain  Outcome: Ongoing  Goal: Control of chronic pain  Description: Control of chronic pain  Outcome: Ongoing     Problem: Falls - Risk of:  Goal: Will remain free from falls  Description: Will remain free from falls  Outcome: Ongoing  Goal: Absence of physical injury  Description: Absence of physical injury  Outcome: Ongoing     Problem: OXYGENATION/RESPIRATORY FUNCTION  Goal: Patient will maintain patent airway  Outcome: Ongoing  Goal: Patient will achieve/maintain normal respiratory rate/effort  Description: Respiratory rate and effort will be within normal limits for the patient  Outcome: Ongoing

## 2022-03-25 NOTE — PROGRESS NOTES
PULMONARY & CRITICAL CARE MEDICINE PROGRESS  NOTE     Patient:  Debbie Diaz  MRN: [id number]  Admit date: 3/18/2022  Primary Care Physician: Brigid Dela Cruz DO  Consulting Physician: Milly Hudson MD  CODE Status: Full Code  LOS: 7    SUBJECTIVE     I personally interviewed/examined the patient, reviewed interval history and interpreted all available radiographic, laboratory data at the time of service. Chief Compliant/Reason for Initial Consult:   Acute on chronic hypoxic hypercapnic hypercapnic respiratory failure  COPD/CHF exacerbation  Pneumonia/pulmonary edema    Brief Hospital Course: The patient is a 55 y.o. female presents with complaint of shortness of breath, associated with stuffy nose, productive cough, clear sputum. Patient desatted to high 60s and low 70s. According to EMS, patient's oxygen tubing was kinked and was saturating at 70%. Improved with new tubing. She was placed on nonrebreather. Patient also complained of occasional substernal chest pain pressure-like sensation radiating to back at the time of presentation. On presentation, patient afebrile hemodynamically stable saturating at 96% on high flow 90% FiO2 30 L/minute   Pertinent labs: proBNP 1086  WBC 11.5  Procalcitonin 0.25  Negative for COVID-19 testing  VBG: pH 7.26/PCO2 85/PO2 40.6/bicarb 37. Acute on chronic hypercapnic respiratory acidosis with appropriate metabolic compensation  Chest x-ray: Patchy airspace opacity right worse than left. CT PE: Negative for PE. Groundglass opacity present. Reactive adenopathy throughout mediastinum and hilar region. Patchy consolidation right lower lobe. Interval History:  03/25/22  Overnight events noted, chart reviewed labs and medications reviewed. She remains on high flow nasal cannula on 25 L and 55%. According to patient she did use BiPAP overnight for 7 hours BiPAP settings 12/6/50 percent.   She is able to tolerate BiPAP better for last 2 nights. She has mild cough denies any sputum production denies chest pain shortness of breath is better but not ambulating currently. She is currently on Bumex 2 mg twice daily. Urine output reported to be 2850 last 24 hours. Labs shows creatinine is a stable 1.05 BUN is 35 bicarbonate 33 potassium is 3.6 and sodium 137. WBC count 9.7     Chest x-ray 2022 shows better aeration of right lower lung field right lower lobe infiltrate/atelectasis improved cardiomegaly present  Echocardiogram on 2022 shows severe pulmonary hypertension with estimated RVSP 80 with right ventricular dilatation and volume overload and moderate to severe TR    Review of Systems:  Review of Systems   Constitutional: Positive for fatigue. Negative for fever. HENT: Negative for postnasal drip, rhinorrhea, sore throat, trouble swallowing and voice change. Eyes: Negative for photophobia and redness. Respiratory: Positive for cough and shortness of breath. Negative for wheezing. Cardiovascular: Positive for leg swelling. Negative for chest pain. Gastrointestinal: Negative for abdominal pain, diarrhea and vomiting. Endocrine: Negative for polydipsia, polyphagia and polyuria. Genitourinary: Negative for difficulty urinating, dysuria, frequency and hematuria. Musculoskeletal: Negative. Allergic/Immunologic: Negative. Neurological: Negative for dizziness, syncope, speech difficulty and headaches. Hematological: Negative for adenopathy. Does not bruise/bleed easily. Psychiatric/Behavioral: Negative.         OBJECTIVE     VITAL SIGNS:   LAST-  /80   Pulse 80   Temp 98.3 °F (36.8 °C) (Oral)   Resp 20   Ht 5' 6\" (1.676 m)   Wt (!) 355 lb (161 kg)   SpO2 92%   BMI 57.30 kg/m²   8-24 HR RANGE-  TEMP Temp  Av.9 °F (36.6 °C)  Min: 97.6 °F (36.4 °C)  Max: 98.3 °F (62.6 °C)   BP Systolic (93XVV), JBF:168 , Min:114 , DGD:632      Diastolic (90CSE), WZF:17, Min:63, Max:86     PULSE Pulse  Av  Min: 80  Max: 90   RR Resp  Av.7  Min: 16  Max: 20   O2 SAT SpO2  Av %  Min: 92 %  Max: 92 %   OXYGEN DELIVERY O2 Flow Rate (L/min)  Av L/min  Min: 25 L/min  Max: 25 L/min     Systemic Examination:   Physical Exam  General appearance - looks comfortable and in mildly tachypneic and not in acute distress  Mental status - alert, oriented to person, place, and time  Eyes - pupils equal and reactive, extraocular eye movements intact  Mouth - mucous membranes moist, pharynx normal without lesions, Mallampati 2  Neck -Short thick neck supple, no significant adenopathy  Chest - Chest was symmetrical without dullness to percussion. Air entry is severely reduced and distant bilaterally with no rhonchi and prolonged expiration no expiratory wheezing mild crackles present at bases. There were no wheezes, rhonchi or rales.   There is no intercostal recession or use of accessory muscles  Heart - normal rate, regular rhythm, normal S1, S2, no murmurs, rubs, clicks or gallops  Abdomen - soft, nontender, nondistended, no masses or organomegaly  Neurological - alert, oriented, normal speech, no focal findings or movement disorder noted  Extremities - peripheral pulses normal, positive pedal edema, no clubbing or cyanosis  Skin - normal coloration and turgor, no rashes, no suspicious skin lesions noted     DATA REVIEW     Medications:  Scheduled Meds:   insulin lispro  0-6 Units SubCUTAneous TID     insulin lispro  0-3 Units SubCUTAneous Nightly    bumetanide  2 mg IntraVENous BID    docusate sodium  100 mg Oral Daily    hydrALAZINE  25 mg Oral 3 times per day    sodium chloride flush  5-40 mL IntraVENous 2 times per day    enoxaparin  30 mg SubCUTAneous BID    amLODIPine  5 mg Oral Daily    atorvastatin  40 mg Oral Nightly    budesonide-formoterol  2 puff Inhalation BID    fluticasone  1 spray Nasal Daily    folic acid  1 mg Oral Daily    gabapentin  300 mg Oral TID    isosorbide dinitrate  20 mg Oral TID    pantoprazole  40 mg Oral QAM AC    sertraline  100 mg Oral Daily    spironolactone  25 mg Oral Daily    tiotropium  2 puff Inhalation Daily    guaiFENesin  600 mg Oral BID     Continuous Infusions:   dextrose      sodium chloride 25 mL (03/20/22 0840)     LABS:-  ABG:   No results for input(s): POCPH, POCPCO2, POCPO2, POCHCO3, SDFW3KLG in the last 72 hours. CBC:   Recent Labs     03/23/22  0640 03/24/22  0442 03/25/22  0349   WBC 8.5 10.0 9.7   HGB 9.2* 9.3* 10.1*   HCT 32.5* 32.2* 36.3   MCV 88.8 87.3 90.8    247 261   LYMPHOPCT 15* 14* 16*   RBC 3.66* 3.69* 4.00   MCH 25.1* 25.2 25.3   MCHC 28.3* 28.9 27.8*   RDW 15.4* 15.3* 15.3*     BMP:   Recent Labs     03/23/22  0640 03/24/22 0442 03/25/22  0349    139 137   K 3.0* 3.7 3.6*   CL 89* 93* 90*   CO2 35* 34* 33*   BUN 28* 33* 35*   CREATININE 1.17* 1.09* 1.05*   GLUCOSE 153* 142* 190*     Liver Function Test:   No results for input(s): PROT, LABALBU, ALT, AST, GGT, ALKPHOS, BILITOT in the last 72 hours. Amylase/Lipase:  No results for input(s): AMYLASE, LIPASE in the last 72 hours. Coagulation Profile:   No results for input(s): INR, PROTIME, APTT in the last 72 hours. Cardiac Enzymes:  No results for input(s): CKTOTAL, CKMB, CKMBINDEX, TROPONINI in the last 72 hours.   Lactic Acid:  No results found for: LACTA  BNP:   No results found for: BNP  D-Dimer:  Lab Results   Component Value Date    DDIMER 0.86 03/19/2022     Others:   Lab Results   Component Value Date    TSH 2.36 02/23/2019     Lab Results   Component Value Date    LAUREN NEGATIVE 05/07/2019    CRP 8.0 (H) 06/09/2018     No results found for: Kushal Sharp  Lab Results   Component Value Date    IRON 34 (L) 04/23/2020    TIBC 235 (L) 04/23/2020    FERRITIN 275 (H) 04/23/2020     No results found for: SPEP, UPEP  No results found for: PSA, CEA, , HY2792,     Input/Output:    Intake/Output Summary (Last 24 hours) at 3/25/2022 1991 [facility] filed at 3/25/2022 0800  Gross per 24 hour   Intake 720 ml   Output 2100 ml   Net -1380 ml       Microbiology:  No results for input(s): SPECDESC, SPECDESC, SPECIAL, CULTURE, CULTURE, STATUS, ORG, CDIFFTOXPCR, CAMPYLOBPCR, SALMONELLAPC, SHIGAPCR, SHIGELLAPCR, MPNEUG, MPNEUM, LACTOQL in the last 72 hours. Pathology:    Radiology reports:  XR CHEST PORTABLE   Final Result   Improvement in the the right basal infiltrate. FL MODIFIED BARIUM SWALLOW W VIDEO   Final Result   No penetration or aspiration with the above administered substances. Please see separate speech pathology report for full discussion of findings   and recommendations. XR CHEST PORTABLE   Final Result   Findings suggestive of worsening bibasilar pneumonia. CT CHEST PULMONARY EMBOLISM W CONTRAST   Final Result   No evidence of pulmonary embolism. There is patchy ill-defined opacification   lower lung fields bilaterally suggesting infiltrate with ground-glass opacity   concerning for pneumonia as well in the upper lung fields. Reactive   adenopathy throughout the mediastinum and hilar regions. RECOMMENDATIONS:   Unavailable         VL DUP LOWER EXTREMITY VENOUS BILATERAL   Final Result      XR CHEST PORTABLE   Final Result   Stable cardiomegaly with mild pulmonary vascular congestion.              Echocardiogram:   Results for orders placed during the hospital encounter of 02/22/19    ECHO Complete 2D W Doppler W Color    Narrative  Transthoracic Echocardiography Report (TTE)    Patient Name PRICE       Date of Study               02/25/2019  Rosangela Gabriel    Date of      1975  Gender                      Female  Birth    Age          37 year(s)  Race                        Black    Room Number  3001        Height:                     60 inch, 152.4 cm    Corporate ID [id number]  Weight:                     412 pounds, 186.9 kg  #    Patient Acct [de-identified]   BSA:          2.54 m^2      BMI: 80.46  #                                                              kg/m^2    MR #         [id number]     Serafin Valverde    Accession #  [id number]   Interpreting Physician      [facility]    Fellow                   Referring Nurse  Practitioner    Interpreting             Referring Physician         Shea Estrada    Type of Study    TTE procedure:2D Echocardiogram, M-Mode, Doppler, Color Doppler. Procedure Date  Date: 02/25/2019 Start: 07:44 AM    Study Location: [facility]  Technical Quality: Poor visualization due to body habitus. History / Tech. Comments:  Procedure explained to patient. Very technically difficult study due to body  habitus. 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    - Sulfa. CONCLUSIONS    Summary  Technically difficult study. Left ventricle is normal in size. Mild concentric LVH Global left  ventricular systolic function appears hyperdynamic Calculated ejection  fraction is 75% . Mild left ventricular hypertrophy. Moderately dilated right ventricle size and severely decreased systolic  function. Estimated right ventricular systolic pressure is 44 mmHg. Moderate pulmonary hypertension. mildly elevated RA filling pressure . Signature  ----------------------------------------------------------------------------  Electronically signed by Isiah Marie(Interpreting physician) on  02/25/2019 03:15 PM  ----------------------------------------------------------------------------    ----------------------------------------------------------------------------  Electronically signed by Bambi Ray(Sonographer) on 02/25/2019  09:28 AM  ----------------------------------------------------------------------------  FINDINGS  Left Atrium  Left atrium is normal in size.   Left Ventricle  Technically difficult study. Left ventricle is normal in size. Mld  concentric LVH Global left ventricular systolic function appears  hyperdynamic Calculated ejection fraction is 75% . Mild left ventricular hypertrophy. Right Atrium  Normal right atrial dimension. Right Ventricle  Moderately dilated right ventricle size and severely decreased systolic  function. Mitral Valve  Normal mitral valve structure. Trivial mitral regurgitation. No mitral stenosis. Aortic Valve  Aortic valve structure and function normal.  Aortic valve is trileaflet. No aortic insufficiency. No aortic stenosis. Tricuspid Valve  Normal tricuspid valve leaflets. Trivial tricuspid regurgitation. No tricuspid stenosis. Estimated right ventricular systolic pressure is 44 mmHg. Moderate pulmonary hypertension. Pulmonic Valve  Pulmonic valve is grossly normal in structure and function. No pulmonic insufficiency seen  Pericardial Effusion  No pericardial effusion seen. Miscellaneous  Normal aortic root dimension. E/E'' = 10.75. IVC normal diameter & with impaired inspiratory collapse indicating mildly  elevated RA filling pressure . Subcostal views are not well visualized.     M-mode / 2D Measurements & Calculations:    LVIDd:4.6 cm(3.7 - 5.6 cm)       Diastolic XKUYBV:03.8 ml  XASPK:4.1 cm(2.2 - 4.0 cm)       Systolic PSYRJL:53.3 ml  NNOK:0.1 cm(0.6 - 1.1 cm)        Aortic Root:2.5 cm(2.0 - 3.7 cm)  LVPWd:1.2 cm(0.6 - 1.1 cm)       LA Dimension: 3.5 cm(1.9 - 4.0 cm)  Fractional Shortenin.96 %    LA volume/Index: 67.93 ml /27m^2  Calculated LVEF (%): 74.92 %     LVOT:1.8 cm  RVDd:4 cm    Mitral:                                 Aortic    Valve Area (P1/2-Time): 2.16 cm^2       Peak Velocity: 1.84 m/s  Peak E-Wave: 0.99 m/s                   Mean Velocity: 1.24 m/s  Peak A-Wave: 0.68 m/s                   Peak Gradient: 13.54 mmHg  E/A Ratio: 1.44                         Mean Gradient: 7 mmHg  Peak Gradient: 3.88 mmHg  Mean Gradient: 2 mmHg  Deceleration Time: 310 msec             Area (continuity): 1.71 cm^2  P1/2t: 102 msec                         AV VTI: 41.7 cm    Area (continuity): 1.91 cm^2  Mean Velocity: 0.58 m/s    Tricuspid:                              Pulmonic:    Estimated RVSP: 44 mmHg                 Peak Velocity: 1.41 m/s  Peak TR Velocity: 2.71 m/s              Peak Gradient: 7.95 mmHg  Peak TR Gradient: 29.3764 mmHg  Estimated RA Pressure: 15 mmHg    Estimated PASP: 44.38 mmHg    Diastology / Tissue Doppler  Septal Wall E' velocity:0.10 m/s  Septal Wall E/E':10.2  Lateral Wall E' velocity:0.09 m/s  Lateral Wall E/E':11.3      Cardiac Catheterization:   No results found for this or any previous visit. ASSESSMENT AND PLAN     Assessment:    //Acute on chronic hypoxic hypercapnic respiratory failure  //Right lower lobe pneumonia  //COPD exacerbation  //Bilateral groundglass infiltrate possibly atypical infection versus pulmonary edema  //Obesity hypoventilation syndrome  //Obstructive sleep apnea noncompliant with CPAP/BiPAP  //Severe pulmonary hypertension group 2/3  //Acute on chronic cor pulmonale  //Chronic diastolic CHF last echo 0994  //Hypertension  //Morbid obesity. Plan:    I had a long discussion with patient on 03/24/2022 discussed the echo finding I have also discussed with her about severe pulmonary hypertension and right ventricular failure and the need for continued BiPAP/CPAP at home and also compliance with oxygen discussed. Her pulmonary hypertension and cor pulmonale is group 2 mainly possibly contributed by diastolic CHF and as mentioned above need oxygen control of hypercapnia and BiPAP use to improve right ventricular function and will ultimately need follow-up echocardiogram after compliance with BiPAP and continued use of oxygen. Consideration of right heart catheter then if continue to have significant right heart dysfunction.   Patient currently and continues require high flow nasal cannula currently 55 % on 25 L continue to be hypoxic  Wean FiO2 on high flow nasal cannula to keep saturation above 88%. I have discussed with her to continue BiPAP every night and every time she is sleeping during the daytime. She is doing better on BiPAP she has CPAP at home she will need BiPAP on discharge    Continue with Symbicort and Spiriva  Encourage incentive spirometry, pulmonary toilet, aspiration precautions and ambulation out of bed to chair  Currently on Bumex 2 mg twice daily would recommend monitoring of electrolytes, renal function management per primary service. Continue to monitor I/O with a goal of negative fluid balance  Antimicrobials reviewed; finished levofloxacin  Continue prednisone at current dose short course of 5 days  DVT prophylaxis on Lovenox 30 mg twice daily. Physical/occupational/speech therapy; increase activity as tolerated      Discussed with nursing staff, treatment and plan discussed  I updated the patient and son regarding the current clinical condition, provisional diagnosis and management plan. I addressed concerns and answered all questions to the best of my abilities. It was my pleasure to evaluate Mayrae Nurse today. We will continue to follow. I would like to thank you for allowing me to participate in the care of this patient. Please feel free to call with any further questions or concerns. Dina Mistry MD.   Pulmonary and Critical Care Medicine           3/25/2022, 10:43 AM    Please note that this chart was generated using voice recognition Dragon dictation software. Although every effort was made to ensure the accuracy of this automated transcription, some errors in transcription may have occurred.

## 2022-03-25 NOTE — PROGRESS NOTES
Physical Therapy  DATE: 3/25/2022  NAME: Dina Peterson  MRN: [id number]   : 1975    Discharge Recommendations: Continue to Assess (pending progress)     Subjective: Pt required lots of encouragement for participation with fair return. Pain: Denies pain  Patient follows: All Commands  Is patient on ventilator: No( on High Flow)  Is patient on sedation: No  Precautions: GENERAL, FALL PRECAUTION. Therapeutic exercises:  UE/LE(s)  Bilateral Active range of motion all planes x 10 reps  bilateral gastrocnemius stretching 3 reps x 20 seconds  Rolling x 2 to get on and of Bed pan requiring mod-MIN A. Goals  Short Term Goals  Short term goal 1: Pt to demonstrate supine <> sit with Min A  Short term goal 2: Pt to be independent with HEP for bilateral LE ROM  Short term goal 3: pt to sit at EOB with Spo2 > 90% with good pt tolerance. Plan: Progress functional mobility as medically appropriate.    Time In: 1508  Time Out: 1838  Time Coded Minutes (treatment minutes): 30  Rehab Potential: Fair  Treatments/week: 5-6x/wk    Gerarda Foots, PTA

## 2022-03-25 NOTE — PROGRESS NOTES
03/25/22 0748 03/25/22 0749   Treatment   Treatment Type MDI MDI   Medications Tiotropium Budesonide/Formoterol  (mouth rinsed)     Inhaler / Aerosol Education        [x] Served spacer     [x] Good return demonstration per patient

## 2022-03-25 NOTE — PLAN OF CARE
Problem: OXYGENATION/RESPIRATORY FUNCTION  Goal: Patient will maintain patent airway  3/25/2022 1517 by Nathaly Oswald RCP  Outcome: Ongoing  3/25/2022 0307 by Gavin Neal RN  Outcome: Ongoing  Goal: Patient will achieve/maintain normal respiratory rate/effort  Description: Respiratory rate and effort will be within normal limits for the patient  3/25/2022 1517 by Nathaly Oswald RCP  Outcome: Ongoing  3/25/2022 0307 by Gavin Neal RN  Outcome: Ongoing     PROVIDE ADEQUATE OXYGENATION WITH ACCEPTABLE SP02/ABG'S     [x]         IDENTIFY APPROPRIATE OXYGEN THERAPY  [x]         MONITOR SP02/ABG'S AS NEEDED   [x]         PATIENT EDUCATION AS NEEDED     NON INVASIVE VENTILATION  PROVIDE OPTIMAL VENTILATION/ACCEPTABLE SP02  IMPLEMENT NON INVASIVE VENTILATION PROTOCOL  ASSESSMENT SKIN INTEGRITY  PATIENT EDUCATION AS NEEDED  BIPAP AS NEEDED

## 2022-03-25 NOTE — CARE COORDINATION
Discussed with Dr Mark nAthony Bridges possible LTACH at discharge. He thinks pt should be given some more time to come off of high flow. Pt is very hesitant to go to [facility] and really wants to go home with [facility] if possible.  If she needs LTACH, she prefers [facility]

## 2022-03-25 NOTE — PROGRESS NOTES
03/25/22 0751   Treatment   Treatment Type IS   Incentive Spirometry Tx   Treatment Tolerance Well  (needs encouragement to use)   Incentive Spirometry Achieved (mL) 1000 mL     Incentive Spirometry education and demonstration given by Respiratory Therapy.     Rosa Meigs, RCP  11:21 AM

## 2022-03-26 NOTE — PROGRESS NOTES
[facility]  Internal Medicine Teaching Residency Program  Inpatient Daily Progress Note  ______________________________________________________________________________    Patient: Jerod Hernandez  YOB: 1975   SGX:[id number]    Acct: [de-identified]     Room: 2008/2008-01  Admit date: 3/18/2022  Today's date: 03/26/22  Number of days in the hospital: 8    SUBJECTIVE   Admitting Diagnosis: Pneumonia  CC: Shortness of breath     - Pt examined at bedside. Chart & results reviewed. - No acute events overnight  - Patient resting comfortably in bed on BiPAP this morning  - Patient's urine light red in color  - Patient denies any urinary symptoms or abdominal pain  - High flow nasal cannula 25 L and 45% FiO2  - Afebrile  - Vital signs stable      Plan for today:  - Urinalysis with reflex culture  - Encourage patient to work with PT/OT  - Goal SPO2 88 to 92%  - Continue to wean oxygen as tolerated  - Transition to PO Bumex BID     ROS:  Constitutional:  negative for chills, fevers, sweats  Respiratory:  negative for cough, dyspnea on exertion, hemoptysis, shortness of breath, wheezing  Cardiovascular:  negative for chest pain, chest pressure/discomfort, lower extremity edema, palpitations  Gastrointestinal:  negative for abdominal pain, constipation, diarrhea, nausea, vomiting  Neurological:  negative for dizziness, headache    BRIEF HISTORY     The patient is a pleasant 46 y.o. female presents with a chief complaint of shortness of breath.  Past medical history significant for CHF, HIEN, COPD on 3-4 L of oxygen, asthma, hyperlipidemia, hypertension.  Patient states that she usually uses 3 to 4 L of oxygen.  Patient was brought in through the EMS when she became significantly short of breath this morning and her saturation dropped to low 70s and high 60s. .  According to the EMS, patient's oxygen tubing was kinked and she was saturating at 70%.  This improved with new tubing and patient's oxygen saturation improved to 90%.  Patient states that EMS placed her on a nonrebreather mask.     Patient states that she experiences chest pain occasionally- the pain is substernal, is at rest and sometimes pain decreases with changing position.  Patient states that the pain is sudden in onset describes the character as a pressure-like sensation, it radiates to the back. OBJECTIVE     Vital Signs:  /80   Pulse 79   Temp 98.6 °F (37 °C) (Axillary)   Resp 15   Ht 5' 6\" (1.676 m)   Wt (!) 355 lb (161 kg)   SpO2 94%   BMI 57.30 kg/m²     Temp (24hrs), Av.2 °F (36.8 °C), Min:97.5 °F (36.4 °C), Max:98.6 °F (37 °C)    In: 720   Out: 1950 [Urine:1950]    Physical Exam:  Physical Exam  Vitals and nursing note reviewed. Constitutional:       Appearance: Normal appearance. HENT:      Head: Normocephalic and atraumatic. Nose: Nose normal.      Mouth/Throat:      Mouth: Mucous membranes are moist.      Pharynx: Oropharynx is clear. Eyes:      Extraocular Movements: Extraocular movements intact. Conjunctiva/sclera: Conjunctivae normal.      Pupils: Pupils are equal, round, and reactive to light. Cardiovascular:      Rate and Rhythm: Normal rate and regular rhythm. Pulses: Normal pulses. Heart sounds: Normal heart sounds. No murmur heard. No friction rub. No gallop. Pulmonary:      Effort: Pulmonary effort is normal. No respiratory distress. Breath sounds: No stridor. No wheezing, rhonchi or rales. Chest:      Chest wall: No tenderness. Abdominal:      General: Abdomen is flat. Bowel sounds are normal. There is no distension. Palpations: Abdomen is soft. There is no mass. Tenderness: There is no abdominal tenderness. There is no guarding or rebound. Hernia: No hernia is present. Musculoskeletal:         General: No swelling, tenderness, deformity or signs of injury. Normal range of motion.       Cervical back: Normal range of motion. Right lower leg: No edema. Left lower leg: No edema. Skin:     General: Skin is warm. Neurological:      General: No focal deficit present. Mental Status: She is alert and oriented to person, place, and time. Mental status is at baseline. Psychiatric:         Mood and Affect: Mood normal.         Behavior: Behavior normal.         Thought Content:  Thought content normal.         Judgment: Judgment normal.           Medications:  Scheduled Medications:    insulin lispro  0-6 Units SubCUTAneous TID WC    insulin lispro  0-3 Units SubCUTAneous Nightly    albuterol sulfate HFA  2 puff Inhalation Q6H WA    predniSONE  20 mg Oral Daily    bumetanide  2 mg IntraVENous BID    docusate sodium  100 mg Oral Daily    hydrALAZINE  25 mg Oral 3 times per day    sodium chloride flush  5-40 mL IntraVENous 2 times per day    enoxaparin  30 mg SubCUTAneous BID    amLODIPine  5 mg Oral Daily    atorvastatin  40 mg Oral Nightly    budesonide-formoterol  2 puff Inhalation BID    fluticasone  1 spray Nasal Daily    folic acid  1 mg Oral Daily    gabapentin  300 mg Oral TID    isosorbide dinitrate  20 mg Oral TID    pantoprazole  40 mg Oral QAM AC    sertraline  100 mg Oral Daily    spironolactone  25 mg Oral Daily    tiotropium  2 puff Inhalation Daily    guaiFENesin  600 mg Oral BID     Continuous Infusions:    dextrose      sodium chloride 25 mL (03/20/22 0840)     PRN MedicationsLORazepam, 1 mg, Q4H PRN  glucose, 15 g, PRN  dextrose, 12.5 g, PRN  glucagon (rDNA), 1 mg, PRN  dextrose, 100 mL/hr, PRN  sodium chloride flush, 5-40 mL, PRN  sodium chloride, 25 mL, PRN  ondansetron, 4 mg, Q8H PRN   Or  ondansetron, 4 mg, Q6H PRN  polyethylene glycol, 17 g, Daily PRN  acetaminophen, 650 mg, Q6H PRN   Or  acetaminophen, 650 mg, Q6H PRN  oxyCODONE-acetaminophen, 2 tablet, Q6H PRN  sodium chloride, 1 spray, PRN        Diagnostic Labs:  CBC:   Recent Labs     03/24/22  0442 03/25/22  5829 03/26/22  0259   WBC 10.0 9.7 9.7   RBC 3.69* 4.00 3.97   HGB 9.3* 10.1* 10.1*   HCT 32.2* 36.3 35.1*   MCV 87.3 90.8 88.4   RDW 15.3* 15.3* 15.5*    261 259     BMP:   Recent Labs     03/24/22  0442 03/25/22  0349 03/26/22  0259    137 133*   K 3.7 3.6* 4.2   CL 93* 90* 88*   CO2 34* 33* 32*   BUN 33* 35* 33*   CREATININE 1.09* 1.05* 0.91*     BNP: No results for input(s): BNP in the last 72 hours. PT/INR: No results for input(s): PROTIME, INR in the last 72 hours. APTT: No results for input(s): APTT in the last 72 hours. CARDIAC ENZYMES: No results for input(s): CKMB, CKMBINDEX, TROPONINI in the last 72 hours. Invalid input(s): CKTOTAL;3  FASTING LIPID PANEL:  Lab Results   Component Value Date    CHOL 144 11/17/2018    HDL 42 10/07/2020    TRIG 68 11/17/2018     LIVER PROFILE: No results for input(s): AST, ALT, ALB, BILIDIR, BILITOT, ALKPHOS in the last 72 hours. MICROBIOLOGY:   Lab Results   Component Value Date/Time    CULTURE NO GROWTH 6 DAYS 06/29/2021 07:03 PM       Imaging:    XR CHEST PORTABLE    Result Date: 3/22/2022  Improvement in the the right basal infiltrate. FL MODIFIED BARIUM SWALLOW W VIDEO    Result Date: 3/19/2022  No penetration or aspiration with the above administered substances. Please see separate speech pathology report for full discussion of findings and recommendations. ASSESSMENT & PLAN     Assessment and Plan:    Principal Problem:    Pneumonia  Active Problems:    HTN (hypertension)    COPD exacerbation (HCC)    Pulmonary hypertension, moderate to severe (HCC)    Morbid obesity with BMI of 50.0-59.9, adult (HCC)    STEPH (obstructive sleep apnea)    Normocytic anemia    Dyspnea    Prediabetes    Hyperlipidemia    Hypokalemia    Hypomagnesemia  Resolved Problems:    * No resolved hospital problems.  *      Community acquired pneumonia - Levaquin course completed 3/26  Acute on chronic CHF; HFpEF EF 75% 02/2019; resumed home isordil 20 mg tid and aldactone 25 mg qd; Transition to PO Bumex 2mg BID. Echo result indicative of EF of 60% with severe pulmonary hypertension  COPD exacerbation - pulmonology on board; continue albuterol, symbicort, spiriva. Started on predisone 40 mg for 5 days completed. Pulmonology following. Recommendations appreciated. Trilogy vent recommended for discharge. Continue to wean oxygen as tolerated. Goal to get patient back on 4 to 5 L via nasal cannula. Acute on chronic hypoxic respiratory failure secondary to #2 and #3  HTN - Norvasc 5 mg qd, hydralazine 25 mg q8h. Will consider optimization of meds with addition of Lisinopril due to DM   HLD - Lipitor 40 mg qd  Type 2 DM HbA1C 6.4 03/22- Sugars well controlled. Neither Lantus nor Sliding scale Insulin needed currently. Hypoglycemia protocol. MDD - Zoloft  Morbid obesity - Counseled      DVT ppx: Lovenox  GI ppx: Protonix     PT/OT/SW: On board  Discharge Planning: Ongoing pending ability to wean from high flow oxygen as appropriate.  efforts appreciated.          Miguel Delgado MD  Internal Medicine Resident, PGY-1  [facility]; [place], [place]   7:04 AM 3/26/2022     Please note that part of this chart was generated using voice recognition dictation software. Although every effort was made to ensure the accuracy of this automated transcription, some errors in transcription may have occurred. I have discussed the care of [address] [place] [address] , including pertinent history and exam findings,    today with the resident. I have seen and examined the patient and the key elements of all parts of the encounter have been performed by me . I agree with the assessment, plan and orders as documented by the resident.      Principal Problem:    Pneumonia  Active Problems:    HTN (hypertension)    COPD exacerbation (HCC)    Pulmonary hypertension, moderate to severe ([address]    Morbid obesity with BMI of 50.0-59.9, adult (HCC)    STEPH (obstructive sleep apnea)    Normocytic anemia    Dyspnea    Prediabetes    Hyperlipidemia    Hypokalemia    Hypomagnesemia  Resolved Problems:    * No resolved hospital problems. *        Overall  course ;                                   are improving over time.         Clinically doing better   Oxygen requirement going down           Electronically signed by Chiquis Reeves MD

## 2022-03-26 NOTE — PROGRESS NOTES
Physical Therapy  Facility/Department: [facility]  Daily Treatment Note  NAME: Yissel Gotti  : 1975  MRN: [id number]    Date of Service: 3/26/2022    Discharge Recommendations:  Patient would benefit from continued therapy after discharge   PT Equipment Recommendations  Equipment Needed: No    Assessment   Body structures, Functions, Activity limitations: Decreased functional mobility ; Decreased ADL status; Decreased strength;Decreased endurance;Decreased balance;Decreased ROM  Assessment: Pt demonstrates mobility deficits requiring max-A to perform bed mobility, amb 3ft with beraitric RW Mod A x2; Pt demonstrates significant endurance deficits, BLE strength deficits and SOB. Pt would be unsafe to return to prior living arrangements upon discharge. Pt would benefit from additional therapy to address aforementioned deficits. Specific instructions for Next Treatment: encourage sitting at EOB or up to chair as tolerated  Prognosis: Fair  PT Education: Goals;PT Role;Plan of Care;Home Exercise Program;Precautions; Energy Conservation;General Safety; Functional Mobility Training  REQUIRES PT FOLLOW UP: Yes  Activity Tolerance  Activity Tolerance: Patient limited by fatigue;Patient limited by endurance     Patient Diagnosis(es): The encounter diagnosis was Dyspnea, unspecified type. has a past medical history of Acute on chronic diastolic CHF (congestive heart failure) ([address] HIEN (acute kidney injury) ([address] HIEN (acute kidney injury) ([address] Asthma, CHF, Chronic obstructive lung disease ([address] Chronic respiratory failure with hypoxia ([address] COPD, Diabetes mellitus, new onset ([address] Former smoker, HTN, Hyperglycemia, Hyperlipidemia, Hypertension, Morbid obesity with BMI of 60.0-69.9, adult ([address] Neuromuscular disorder ([address] STEPH on CPAP, Oxygen dependent, Pedal edema, Pneumonia, Pulmonary hypertension, moderate to severe ([address] Torn meniscus, and Wears glasses.    has a past surgical history that includes  section (); Abdomen surgery; joint replacement; Cystoscopy (2019); Cystoscopy (N/A, 2019); hc cath power picc triple (2018); pr office/outpt visit,procedure only (N/A, 2018); Tracheotomy (2018); Gastrostomy tube placement (2018); and tracheostomy closure (2018). Restrictions  Restrictions/Precautions  Restrictions/Precautions: General Precautions,Up as Tolerated,Fall Risk,Contact Precautions  Required Braces or Orthoses?: No  Position Activity Restriction  Other position/activity restrictions: minimally ambulatory at baseline;  Subjective   General  Response To Previous Treatment: Patient with no complaints from previous session. Family / Caregiver Present: No  Subjective  Subjective: Pt supine in bed and agreeable to therapy, RN agreeable to therapy. Pt pleasant and cooperative throughout today's session. Pain Screening  Patient Currently in Pain: No  Vital Signs  Patient Currently in Pain: No       Orientation  Orientation  Overall Orientation Status: Within Functional Limits  Cognition      Objective   Bed mobility  Rolling to Right: Minimal assistance  Supine to Sit: Maximum assistance  Sit to Supine: Maximum assistance  Scooting: Maximal assistance  Comment: Cueing for hand lacement ans sequencing . Transfers  Sit to Stand: Moderate Assistance;2 Person Assistance  Stand to sit: Moderate Assistance;2 Person Assistance  Bed to Chair: Moderate assistance;2 Person Assistance  Comment: Transfer with Beriatric RE  Ambulation  Ambulation?: Yes  Ambulation 1  Device: Rolling Walker  Other Apparatus: O2  Assistance: Moderate assistance;2 Person assistance  Gait Deviations: Slow Marina;Decreased step length; Increased NOEL; Decreased step height;Shuffles  Distance: 3ft  Comments: Significant amount of time and effort noted . limited by SOB .  SPO2 decreaing to 84%, improves quickl with rest.  Stairs/Curb  Stairs?: No     Balance  Posture: Fair  Sitting - Static: Fair  Sitting - Dynamic: Fair;-  Standing - Static: -  Standing - Dynamic: Poor;+  Comments: Standing with beriatric  RW  Exercises  Comments: defered d/t fatigue and SOB      AM-PAC Score  AM-PAC Inpatient Mobility Raw Score : 12 (03/26/22 1551)  AM-PAC Inpatient T-Scale Score : 35.33 (03/26/22 1551)  Mobility Inpatient CMS 0-100% Score: 68.66 (03/26/22 1551)  Mobility Inpatient CMS G-Code Modifier : CL (03/26/22 1551)          Goals  Short term goals  Time Frame for Short term goals: 14 visits  Short term goal 1: Pt to demonstrate supine <> sit with Min A  Short term goal 2: Pt to be independent with HEP for bilateral LE ROM  Short term goal 3: pt to sit at EOB with Spo2 > 90% with good pt tolerance. Patient Goals   Patient goals : to improve strength    Plan    Plan  Times per week: 5-6x/wk  Times per day: Daily  Specific instructions for Next Treatment: encourage sitting at EOB or up to chair as tolerated  Current Treatment Recommendations: Strengthening,Transfer Training,Endurance Training,Balance Training,Functional Mobility Training,ROM,Safety Education & Training,Home Exercise Program,Equipment Evaluation, Education, & procurement,Patient/Caregiver Education & Training  Plan Comment: Pt in agreement with PT POC, pt apprensive with mobilty and report assist with all mobiltiy at home prior to admit.   Safety Devices  Type of devices: Left in bed,Call light within reach,Nurse notified,Left in chair,Chair alarm in place,Patient at risk for falls,Gait belt  Restraints  Initially in place: No     Therapy Time   Individual Concurrent Group Co-treatment   Time In 1404         Time Out 1439         Minutes 35         Timed Code Treatment Minutes: [facility]

## 2022-03-26 NOTE — PLAN OF CARE
Problem: Pain:  Goal: Pain level will decrease  Description: Pain level will decrease  Outcome: Ongoing  Goal: Control of acute pain  Description: Control of acute pain  Outcome: Ongoing  Goal: Control of chronic pain  Description: Control of chronic pain  Outcome: Ongoing     Problem: Falls - Risk of:  Goal: Will remain free from falls  Description: Will remain free from falls  Outcome: Ongoing  Goal: Absence of physical injury  Description: Absence of physical injury  Outcome: Ongoing     Problem: OXYGENATION/RESPIRATORY FUNCTION  Goal: Patient will maintain patent airway  3/25/2022 2136 by Suresh Montemayor RN  Outcome: Ongoing  3/25/2022 1517 by Nathaly Oswald RCP  Outcome: Ongoing  Goal: Patient will achieve/maintain normal respiratory rate/effort  Description: Respiratory rate and effort will be within normal limits for the patient  3/25/2022 2136 by Suresh Montemayor RN  Outcome: Ongoing  3/25/2022 1517 by Nathaly Oswald RCP  Outcome: Ongoing

## 2022-03-26 NOTE — PLAN OF CARE
Problem: Pain:  Description: Pain management should include both nonpharmacologic and pharmacologic interventions.   Goal: Pain level will decrease  Description: Pain level will decrease  Outcome: Met This Shift  Goal: Control of acute pain  Description: Control of acute pain  Outcome: Met This Shift  Goal: Control of chronic pain  Description: Control of chronic pain  Outcome: Met This Shift     Problem: Falls - Risk of:  Goal: Will remain free from falls  Description: Will remain free from falls  Outcome: Met This Shift  Goal: Absence of physical injury  Description: Absence of physical injury  Outcome: Met This Shift     Problem: OXYGENATION/RESPIRATORY FUNCTION  Goal: Patient will maintain patent airway  Outcome: Met This Shift  Goal: Patient will achieve/maintain normal respiratory rate/effort  Description: Respiratory rate and effort will be within normal limits for the patient  Outcome: Met This Shift     Problem: Skin Integrity:  Goal: Will show no infection signs and symptoms  Description: Will show no infection signs and symptoms  Outcome: Met This Shift  Goal: Absence of new skin breakdown  Description: Absence of new skin breakdown  Outcome: Met This Shift

## 2022-03-26 NOTE — PROGRESS NOTES
PULMONARY & CRITICAL CARE MEDICINE PROGRESS  NOTE     Patient:  Mathew Light  MRN: [id number]  Admit date: 3/18/2022  Primary Care Physician: Carmen Betts DO  Consulting Physician: Robin Gonzalez MD  CODE Status: Full Code  LOS: 8    SUBJECTIVE     I personally interviewed/examined the patient, reviewed interval history and interpreted all available radiographic, laboratory data at the time of service. Chief Compliant/Reason for Initial Consult:   Acute on chronic hypoxic hypercapnic hypercapnic respiratory failure  COPD/CHF exacerbation  Pneumonia/pulmonary edema    Brief Hospital Course: The patient is a 55 y.o. female presents with complaint of shortness of breath, associated with stuffy nose, productive cough, clear sputum. Patient desatted to high 60s and low 70s. According to EMS, patient's oxygen tubing was kinked and was saturating at 70%. Improved with new tubing. She was placed on nonrebreather. Patient also complained of occasional substernal chest pain pressure-like sensation radiating to back at the time of presentation. On presentation, patient afebrile hemodynamically stable saturating at 96% on high flow 90% FiO2 30 L/minute   Pertinent labs: proBNP 1086  WBC 11.5  Procalcitonin 0.25  Negative for COVID-19 testing  VBG: pH 7.26/PCO2 85/PO2 40.6/bicarb 37. Acute on chronic hypercapnic respiratory acidosis with appropriate metabolic compensation  Chest x-ray: Patchy airspace opacity right worse than left. CT PE: Negative for PE. Groundglass opacity present. Reactive adenopathy throughout mediastinum and hilar region. Patchy consolidation right lower lobe. Interval History:  03/26/22  Overnight events noted, chart reviewed labs and medications reviewed.   On nc   Used bipap at night   Has cpap at home   Sob with minimal ex   No hemoptysis no wheeze     Review of Systems:  Review of Systems   Constitutional: Positive for fatigue. Negative for fever. HENT: Negative for postnasal drip, rhinorrhea, sore throat, trouble swallowing and voice change. Eyes: Negative for photophobia and redness. Respiratory: Positive for cough and shortness of breath. Negative for wheezing. Cardiovascular: Positive for leg swelling. Negative for chest pain. Gastrointestinal: Negative for abdominal pain, diarrhea and vomiting. Endocrine: Negative for polydipsia, polyphagia and polyuria. Genitourinary: Negative for difficulty urinating, dysuria, frequency and hematuria. Musculoskeletal: Negative. Allergic/Immunologic: Negative. Neurological: Negative for dizziness, syncope, speech difficulty and headaches. Hematological: Negative for adenopathy. Does not bruise/bleed easily. Psychiatric/Behavioral: Negative. OBJECTIVE     VITAL SIGNS:   LAST-  /71   Pulse 81   Temp 97.9 °F (36.6 °C) (Oral)   Resp 17   Ht 5' 6\" (1.676 m)   Wt (!) 355 lb (161 kg)   SpO2 (!) 89%   BMI 57.30 kg/m²   8-24 HR RANGE-  TEMP Temp  Av.2 °F (36.8 °C)  Min: 97.5 °F (36.4 °C)  Max: 98.6 °F (37 °C)   BP Systolic (28MBS), JCO:362 , Min:117 , KTI:785      Diastolic (04EYO), VFD:98, Min:67, Max:84     PULSE Pulse  Av.8  Min: 74  Max: 87   RR Resp  Av.5  Min: 16  Max: 17   O2 SAT SpO2  Av.7 %  Min: 89 %  Max: 95 %   OXYGEN DELIVERY O2 Flow Rate (L/min)  Av L/min  Min: 4 L/min  Max: 4 L/min     Systemic Examination:   Physical Exam  General appearance - looks comfortable and in mildly tachypneic and not in acute distress  Mental status - alert, oriented to person, place, and time  Eyes - pupils equal and reactive, extraocular eye movements intact  Mouth - mucous membranes moist, pharynx normal without lesions, Mallampati 2  Neck -Short thick neck supple, no significant adenopathy  Chest - Chest was symmetrical without dullness to percussion.   Air entry is severely reduced and distant bilaterally with no rhonchi and prolonged expiration no expiratory wheezing mild crackles present at bases. There were no wheezes, rhonchi or rales. There is no intercostal recession or use of accessory muscles  Heart - normal rate, regular rhythm, normal S1, S2, no murmurs, rubs, clicks or gallops  Abdomen - soft, nontender, nondistended, no masses or organomegaly  Neurological - alert, oriented, normal speech, no focal findings or movement disorder noted  Extremities - peripheral pulses normal, positive pedal edema, no clubbing or cyanosis  Skin - normal coloration and turgor, no rashes, no suspicious skin lesions noted     DATA REVIEW     Medications:  Scheduled Meds:   bumetanide  2 mg Oral BID    insulin lispro  0-6 Units SubCUTAneous TID WC    insulin lispro  0-3 Units SubCUTAneous Nightly    predniSONE  20 mg Oral Daily    docusate sodium  100 mg Oral Daily    hydrALAZINE  25 mg Oral 3 times per day    sodium chloride flush  5-40 mL IntraVENous 2 times per day    enoxaparin  30 mg SubCUTAneous BID    amLODIPine  5 mg Oral Daily    atorvastatin  40 mg Oral Nightly    budesonide-formoterol  2 puff Inhalation BID    fluticasone  1 spray Nasal Daily    folic acid  1 mg Oral Daily    gabapentin  300 mg Oral TID    isosorbide dinitrate  20 mg Oral TID    pantoprazole  40 mg Oral QAM AC    sertraline  100 mg Oral Daily    spironolactone  25 mg Oral Daily    tiotropium  2 puff Inhalation Daily    guaiFENesin  600 mg Oral BID     Continuous Infusions:   dextrose      sodium chloride 25 mL (03/20/22 0840)     LABS:-  ABG:   No results for input(s): POCPH, POCPCO2, POCPO2, POCHCO3, VRUB7NLF in the last 72 hours.   CBC:   Recent Labs     03/24/22  0442 03/25/22  0349 03/26/22  0259   WBC 10.0 9.7 9.7   HGB 9.3* 10.1* 10.1*   HCT 32.2* 36.3 35.1*   MCV 87.3 90.8 88.4    261 259   LYMPHOPCT 14* 16* 10*   RBC 3.69* 4.00 3.97   MCH 25.2 25.3 25.4   MCHC 28.9 27.8* 28.8   RDW 15.3* 15.3* 15.5*     BMP:   Recent Labs 03/24/22  0442 03/25/22  0349 03/26/22  0259    137 133*   K 3.7 3.6* 4.2   CL 93* 90* 88*   CO2 34* 33* 32*   BUN 33* 35* 33*   CREATININE 1.09* 1.05* 0.91*   GLUCOSE 142* 190* 225*     Liver Function Test:   No results for input(s): PROT, LABALBU, ALT, AST, GGT, ALKPHOS, BILITOT in the last 72 hours. Amylase/Lipase:  No results for input(s): AMYLASE, LIPASE in the last 72 hours. Coagulation Profile:   No results for input(s): INR, PROTIME, APTT in the last 72 hours. Cardiac Enzymes:  No results for input(s): CKTOTAL, CKMB, CKMBINDEX, TROPONINI in the last 72 hours. Lactic Acid:  No results found for: LACTA  BNP:   No results found for: BNP  D-Dimer:  Lab Results   Component Value Date    DDIMER 0.86 03/19/2022     Others:   Lab Results   Component Value Date    TSH 2.36 02/23/2019     Lab Results   Component Value Date    LAUREN NEGATIVE 05/07/2019    CRP 8.0 (H) 06/09/2018     No results found for: Jaime Cardona  Lab Results   Component Value Date    IRON 34 (L) 04/23/2020    TIBC 235 (L) 04/23/2020    FERRITIN 275 (H) 04/23/2020     No results found for: SPEP, UPEP  No results found for: PSA, CEA, , UC7982,     Input/Output:    Intake/Output Summary (Last 24 hours) at 3/26/2022 1405  Last data filed at 3/25/2022 2144  Gross per 24 hour   Intake    Output 850 ml   Net -850 ml       Microbiology:  No results for input(s): SPECDESC, SPECDESC, SPECIAL, CULTURE, CULTURE, STATUS, ORG, CDIFFTOXPCR, CAMPYLOBPCR, SALMONELLAPC, SHIGAPCR, SHIGELLAPCR, MPNEUG, MPNEUM, LACTOQL in the last 72 hours. Pathology:    Radiology reports:  XR CHEST PORTABLE   Final Result   Improvement in the the right basal infiltrate. FL MODIFIED BARIUM SWALLOW W VIDEO   Final Result   No penetration or aspiration with the above administered substances. Please see separate speech pathology report for full discussion of findings   and recommendations.          XR CHEST PORTABLE   Final Result   Findings suggestive of worsening bibasilar pneumonia. CT CHEST PULMONARY EMBOLISM W CONTRAST   Final Result   No evidence of pulmonary embolism. There is patchy ill-defined opacification   lower lung fields bilaterally suggesting infiltrate with ground-glass opacity   concerning for pneumonia as well in the upper lung fields. Reactive   adenopathy throughout the mediastinum and hilar regions. RECOMMENDATIONS:   Unavailable         VL DUP LOWER EXTREMITY VENOUS BILATERAL   Final Result      XR CHEST PORTABLE   Final Result   Stable cardiomegaly with mild pulmonary vascular congestion. Echocardiogram:   Results for orders placed during the hospital encounter of 02/22/19    ECHO Complete 2D W Doppler W Color    Narrative  Transthoracic Echocardiography Report (TTE)    Patient Name PRICE       Date of Study               02/25/2019  Jackelyn Romberg    Date of      1975  Gender                      Female  Birth    Age          37 year(s)  Race                        Black    Room Number  3001        Height:                     60 inch, 152.4 cm    Corporate ID [id number]  Weight:                     412 pounds, 186.9 kg  #    Patient Acct [de-identified]   BSA:          2.54 m^2      BMI:      80.46  #                                                              kg/m^2    MR #         [id number]     Sonographer                 Bambi Ray    Accession #  [id number]   Interpreting Physician      [facility]    Fellow                   Referring Nurse  Practitioner    Interpreting             Referring Physician         [place]    Type of Study    TTE procedure:2D Echocardiogram, M-Mode, Doppler, Color Doppler. Procedure Date  Date: 02/25/2019 Start: 07:44 AM    Study Location: [facility]  Technical Quality: Poor visualization due to body habitus. History / Tech. Comments:  Procedure explained to patient. Very technically difficult study due to body  habitus.   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    - Sulfa. CONCLUSIONS    Summary  Technically difficult study. Left ventricle is normal in size. Mild concentric LVH Global left  ventricular systolic function appears hyperdynamic Calculated ejection  fraction is 75% . Mild left ventricular hypertrophy. Moderately dilated right ventricle size and severely decreased systolic  function. Estimated right ventricular systolic pressure is 44 mmHg. Moderate pulmonary hypertension. mildly elevated RA filling pressure . Signature  ----------------------------------------------------------------------------  Electronically signed by Isiah Marie(Interpreting physician) on  02/25/2019 03:15 PM  ----------------------------------------------------------------------------    ----------------------------------------------------------------------------  Electronically signed by Bambi Ray(Sonographer) on 02/25/2019  09:28 AM  ----------------------------------------------------------------------------  FINDINGS  Left Atrium  Left atrium is normal in size. Left Ventricle  Technically difficult study. Left ventricle is normal in size. Mld  concentric LVH Global left ventricular systolic function appears  hyperdynamic Calculated ejection fraction is 75% . Mild left ventricular hypertrophy. Right Atrium  Normal right atrial dimension. Right Ventricle  Moderately dilated right ventricle size and severely decreased systolic  function. Mitral Valve  Normal mitral valve structure. Trivial mitral regurgitation. No mitral stenosis. Aortic Valve  Aortic valve structure and function normal.  Aortic valve is trileaflet. No aortic insufficiency. No aortic stenosis. Tricuspid Valve  Normal tricuspid valve leaflets. Trivial tricuspid regurgitation. No tricuspid stenosis.   Estimated right ventricular systolic pressure is 44 mmHg. Moderate pulmonary hypertension. Pulmonic Valve  Pulmonic valve is grossly normal in structure and function. No pulmonic insufficiency seen  Pericardial Effusion  No pericardial effusion seen. Miscellaneous  Normal aortic root dimension. E/E'' = 10.75. IVC normal diameter & with impaired inspiratory collapse indicating mildly  elevated RA filling pressure . Subcostal views are not well visualized. M-mode / 2D Measurements & Calculations:    LVIDd:4.6 cm(3.7 - 5.6 cm)       Diastolic DZQEEW:45.6 ml  ZSCGT:2.1 cm(2.2 - 4.0 cm)       Systolic DWMMCQ:59.3 ml  DSZF:6.4 cm(0.6 - 1.1 cm)        Aortic Root:2.5 cm(2.0 - 3.7 cm)  LVPWd:1.2 cm(0.6 - 1.1 cm)       LA Dimension: 3.5 cm(1.9 - 4.0 cm)  Fractional Shortenin.96 %    LA volume/Index: 67.93 ml /27m^2  Calculated LVEF (%): 74.92 %     LVOT:1.8 cm  RVDd:4 cm    Mitral:                                 Aortic    Valve Area (P1/2-Time): 2.16 cm^2       Peak Velocity: 1.84 m/s  Peak E-Wave: 0.99 m/s                   Mean Velocity: 1.24 m/s  Peak A-Wave: 0.68 m/s                   Peak Gradient: 13.54 mmHg  E/A Ratio: 1.44                         Mean Gradient: 7 mmHg  Peak Gradient: 3.88 mmHg  Mean Gradient: 2 mmHg  Deceleration Time: 310 msec             Area (continuity): 1.71 cm^2  P1/2t: 102 msec                         AV VTI: 41.7 cm    Area (continuity): 1.91 cm^2  Mean Velocity: 0.58 m/s    Tricuspid:                              Pulmonic:    Estimated RVSP: 44 mmHg                 Peak Velocity: 1.41 m/s  Peak TR Velocity: 2.71 m/s              Peak Gradient: 7.95 mmHg  Peak TR Gradient: 29.3764 mmHg  Estimated RA Pressure: 15 mmHg    Estimated PASP: 44.38 mmHg    Diastology / Tissue Doppler  Septal Wall E' velocity:0.10 m/s  Septal Wall E/E':10.2  Lateral Wall E' velocity:0.09 m/s  Lateral Wall E/E':11.3      Cardiac Catheterization:   No results found for this or any previous visit.       ASSESSMENT AND PLAN     Assessment:    //Acute on chronic hypoxic hypercapnic respiratory failure  //Right lower lobe pneumonia  //COPD exacerbation  //Bilateral groundglass infiltrate possibly atypical infection versus pulmonary edema  //Obesity hypoventilation syndrome  //Obstructive sleep apnea noncompliant with CPAP/BiPAP  //Severe pulmonary hypertension group 2/3  //Acute on chronic cor pulmonale  //Chronic diastolic CHF last echo 3104  //Hypertension  //Morbid obesity. Plan:    Continue bipap   Will benefit from trilogy due to copd , hypercapnia . Continue with Symbicort and Spiriva  Encourage incentive spirometry, pulmonary toilet, aspiration precautions and ambulation out of bed to chair  Currently on Bumex 2 mg twice daily would recommend monitoring of electrolytes, renal function management per primary service. Continue to monitor I/O with a goal of negative fluid balance  Antimicrobials reviewed; finished levofloxacin  Continue prednisone at current dose short course of 5 days  DVT prophylaxis on Lovenox 30 mg twice daily. Physical/occupational/speech therapy; increase activity as tolerated      Discussed with nursing staff, treatment and plan discussed  I updated the patient and son regarding the current clinical condition, provisional diagnosis and management plan. I addressed concerns and answered all questions to the best of my abilities. It was my pleasure to evaluate Jenny Gomes today. We will continue to follow. I would like to thank you for allowing me to participate in the care of this patient. Please feel free to call with any further questions or concerns. Gabriel Vu MD.   Pulmonary and Critical Care Medicine           3/26/2022, 2:05 PM    Please note that this chart was generated using voice recognition Dragon dictation software. Although every effort was made to ensure the accuracy of this automated transcription, some errors in transcription may have occurred.

## 2022-03-27 NOTE — PROGRESS NOTES
Home Oxygen Evaluation    Home Oxygen Evaluation completed. Patient is on 5 liters per minute via Nasal cannula. Resting SpO2 = 93%  Resting SpO2 on room air = 82%    Patient has limited ability to do exercise for study. Patient resting in bed was placed on RA, desaturated to 82% required 5L NC to increase SpO2 >90%. Study completed, pt remains on 5L NC with SpO2 of 93%. Nocturnal Oximetry with patient on room air is recommended is SpO2 is between 89% and 95% (requires additional order).     Viki Scales RCP  3:50 PM

## 2022-03-27 NOTE — PROGRESS NOTES
Patient was evaluated today for the diagnosis of COPD. I entered a DME order for home oxygen because the diagnosis and testing requires the patient to have supplemental oxygen. Condition will improve or be benefited by oxygen use. The patient is not able to perform good mobility in a home setting and therefore does require the use of a portable oxygen system. The need for this equipment was discussed with the patient and she understands and is in agreement. Mike Haji MD  PGY-3, Internal Medicine Resident  [facility]  3/27/2022 2:27 PM    Attending Physician Statement  I have discussed the care of [address] [place] [address], including pertinent history and exam findings,  with the resident. I have seen and examined the patient and the key elements of all parts of the encounter have been performed by me. I agree with the assessment, plan and orders as documented by the resident with additions . Treatment plan Discussed with nursing staff in detail , all questions answered . Electronically signed by Afsaneh Blakely MD on   3/27/22 at 3:26 PM EDT    Please note that this chart was generated using voice recognition Dragon dictation software. Although every effort was made to ensure the accuracy of this automated transcription, some errors in transcription may have occurred.

## 2022-03-27 NOTE — CARE COORDINATION
Met with pt to discuss transition plans and per pt, she has home O2 through [employer]; however, as her oxygen concentrator does not go to 10L and per pt, [employer] does not sell one that does, she would like to obtain a new home O2 concentrator. Pt offered choice of DME providers, did not want list of providers, stated that she had no preference. Writer placed call to [facility], spoke with Bridger Lopez, and per Bridger Lopez, [facility] does have oxygen concentrators that go to 10L. Pt will need new home oxygen testing. Pt ordered a non-invasive ventilator per Dr. Rupinder Dunlap. Pt offered choice of DME provider, pt did not have preference. Writer spoke with Bridger Lopez at [facility], will send referral.    Pt also requested Iliana Truong at discharge, pt offered choice of provider, and per pt, she has used [facility] in the past and requested referral to [facility]. Referral sent. 1335 - Discussed with pt's nurse, Gregory Rodríguez, that home O2 testing was needed and face to face per Dr. Rupinder Dunlap was needed as he wrote script for pt's concentrator. 1500 - Placed call to [facility], left message for intake, call back requested. 1504 - Received call back from Regi at [facility], they can accept pt with start of care in 24-48hrs. 1521 - Faxed face sheet, face to face for non-invasive ventilator, H&P, MAR and order for non-invasive to [facility] at [phone]. Will fax home O2 order and home O2 face to face to [facility] once home O2 evaluation is completed. [facility] call to Bridger Lopez at [facility] and notified him that home oxygen order was being sent, and per Calos Rob will not be able to deliver home O2 tonight as they will have to run insurance benefits as pt already has home O2. Discussed with Gregory Rodríguez, RN. Placed call to Dr. Adrianne Llamas and discussed home oxygen and that [facility] is not able to deliver oxygen concentrator until tomorrow as they will have to verify insurance benefits as pt currently has home O2 through another provider. Also, discussed home oxygen testing and per Dr. Leidy Schneider, home oxygen order written today by Dr. Grace Lopez at [phone] am is to be used. Home oxygen order, face sheet, face to face, home O2 testing, H&P, and MAR faxed to [facility].

## 2022-03-27 NOTE — PLAN OF CARE
Problem: Falls - Risk of:  Goal: Will remain free from falls  Description: Will remain free from falls  3/26/2022 1717 by Glynn Tenorio RN  Outcome: Met This Shift  Goal: Absence of physical injury  Description: Absence of physical injury  3/26/2022 1717 by Glynn Tenorio RN  Outcome: Met This Shift     Problem: Skin Integrity:  Goal: Will show no infection signs and symptoms  Description: Will show no infection signs and symptoms  3/26/2022 1717 by Glynn Tenorio RN  Outcome: Met This Shift  Goal: Absence of new skin breakdown  Description: Absence of new skin breakdown  3/26/2022 1717 by Glynn Tenorio RN  Outcome: Met This Shift

## 2022-03-27 NOTE — PROGRESS NOTES
PULMONARY & CRITICAL CARE MEDICINE PROGRESS  NOTE     Patient:  Yissel Gotti  MRN: [id number]  Admit date: 3/18/2022  Primary Care Physician: Kate Liu DO  Consulting Physician: Naomy Gooden MD  CODE Status: Full Code  LOS: 9    SUBJECTIVE     I personally interviewed/examined the patient, reviewed interval history and interpreted all available radiographic, laboratory data at the time of service. Chief Compliant/Reason for Initial Consult:   Acute on chronic hypoxic hypercapnic hypercapnic respiratory failure  COPD/CHF exacerbation  Pneumonia/pulmonary edema    Brief Hospital Course: The patient is a 55 y.o. female presents with complaint of shortness of breath, associated with stuffy nose, productive cough, clear sputum. Patient desatted to high 60s and low 70s. According to EMS, patient's oxygen tubing was kinked and was saturating at 70%. Improved with new tubing. She was placed on nonrebreather. Patient also complained of occasional substernal chest pain pressure-like sensation radiating to back at the time of presentation. On presentation, patient afebrile hemodynamically stable saturating at 96% on high flow 90% FiO2 30 L/minute   Pertinent labs: proBNP 1086  WBC 11.5  Procalcitonin 0.25  Negative for COVID-19 testing  VBG: pH 7.26/PCO2 85/PO2 40.6/bicarb 37. Acute on chronic hypercapnic respiratory acidosis with appropriate metabolic compensation  Chest x-ray: Patchy airspace opacity right worse than left. CT PE: Negative for PE. Groundglass opacity present. Reactive adenopathy throughout mediastinum and hilar region. Patchy consolidation right lower lobe. Interval History:  03/27/22    No acute events overnight.   She used BiPAP last night, 50% FiO2, 14/8   on 4 L oxygen via nasal cannula  Has exertional shortness of breath and dry cough  No wheeze/chest pain/hemoptysis      Review of Systems:  Review of Systems Constitutional: Positive for fatigue. Negative for fever. HENT: Negative for postnasal drip, rhinorrhea, sore throat, trouble swallowing and voice change. Eyes: Negative for photophobia and redness. Respiratory: Positive for cough and shortness of breath. Negative for wheezing. Cardiovascular: Positive for leg swelling. Negative for chest pain. Gastrointestinal: Negative for abdominal pain, diarrhea and vomiting. Endocrine: Negative for polydipsia, polyphagia and polyuria. Genitourinary: Negative for difficulty urinating, dysuria, frequency and hematuria. Musculoskeletal: Negative. Allergic/Immunologic: Negative. Neurological: Negative for dizziness, syncope, speech difficulty and headaches. Hematological: Negative for adenopathy. Does not bruise/bleed easily. Psychiatric/Behavioral: Negative. OBJECTIVE     VITAL SIGNS:   LAST-  /70   Pulse 82   Temp 98.2 °F (36.8 °C) (Oral)   Resp 23   Ht 5' 6\" (1.676 m)   Wt (!) 355 lb (161 kg)   SpO2 (!) 88%   BMI 57.30 kg/m²   8-24 HR RANGE-  TEMP Temp  Av.2 °F (36.8 °C)  Min: 97.5 °F (36.4 °C)  Max: 98.7 °F (96.3 °C)   BP Systolic (77LLD), AAY:726 , Min:103 , JJ      Diastolic (39YTZ), VRA:94, Min:61, Max:87     PULSE Pulse  Av.4  Min: 80  Max: 101   RR Resp  Av.5  Min: 13  Max: 23   O2 SAT SpO2  Av.8 %  Min: 88 %  Max: 96 %   OXYGEN DELIVERY O2 Flow Rate (L/min)  Av L/min  Min: 5 L/min  Max: 5 L/min     Systemic Examination:   Physical Exam  General appearance - looks comfortable and in mildly tachypneic and not in acute distress  Mental status - alert, oriented to person, place, and time  Eyes - pupils equal and reactive, extraocular eye movements intact  Mouth - mucous membranes moist, pharynx normal without lesions, Mallampati 2  Neck -Short thick neck supple, no significant adenopathy  Chest - Chest was symmetrical without dullness to percussion.   Bilateral decreased breath sounds and prolonged respiration. Mild bibasilar crackles present. No wheeze/rhonchi/Rales. Heart - normal rate, regular rhythm, normal S1, S2, no murmurs, rubs, clicks or gallops  Abdomen - soft, nontender, nondistended, no masses or organomegaly  Neurological - alert, oriented, normal speech, no focal findings or movement disorder noted  Extremities - peripheral pulses normal, positive pedal edema, no clubbing or cyanosis  Skin - normal coloration and turgor, no rashes, no suspicious skin lesions noted     DATA REVIEW     Medications:  Scheduled Meds:   bumetanide  2 mg Oral BID    insulin lispro  0-6 Units SubCUTAneous TID WC    insulin lispro  0-3 Units SubCUTAneous Nightly    predniSONE  20 mg Oral Daily    docusate sodium  100 mg Oral Daily    hydrALAZINE  25 mg Oral 3 times per day    sodium chloride flush  5-40 mL IntraVENous 2 times per day    enoxaparin  30 mg SubCUTAneous BID    amLODIPine  5 mg Oral Daily    atorvastatin  40 mg Oral Nightly    budesonide-formoterol  2 puff Inhalation BID    fluticasone  1 spray Nasal Daily    folic acid  1 mg Oral Daily    gabapentin  300 mg Oral TID    isosorbide dinitrate  20 mg Oral TID    pantoprazole  40 mg Oral QAM AC    sertraline  100 mg Oral Daily    spironolactone  25 mg Oral Daily    tiotropium  2 puff Inhalation Daily    guaiFENesin  600 mg Oral BID     Continuous Infusions:   dextrose      sodium chloride 25 mL (03/20/22 0840)     LABS:-  ABG:   No results for input(s): POCPH, POCPCO2, POCPO2, POCHCO3, UQNF4QYS in the last 72 hours.   CBC:   Recent Labs     03/25/22 0349 03/26/22  0259 03/27/22  0559   WBC 9.7 9.7 11.5*   HGB 10.1* 10.1* 9.7*   HCT 36.3 35.1* 33.7*   MCV 90.8 88.4 88.2    259 273   LYMPHOPCT 16* 10* 21*   RBC 4.00 3.97 3.82*   MCH 25.3 25.4 25.4   MCHC 27.8* 28.8 28.8   RDW 15.3* 15.5* 15.6*     BMP:   Recent Labs     03/25/22 0349 03/26/22  0259 03/27/22  0559    133* 137   K 3.6* 4.2 3.5*   CL 90* 88* 91*   CO2 33* 32* 36*   BUN 35* 33* 30*   CREATININE 1.05* 0.91* 0.88   GLUCOSE 190* 225* 186*     Liver Function Test:   No results for input(s): PROT, LABALBU, ALT, AST, GGT, ALKPHOS, BILITOT in the last 72 hours. Amylase/Lipase:  No results for input(s): AMYLASE, LIPASE in the last 72 hours. Coagulation Profile:   No results for input(s): INR, PROTIME, APTT in the last 72 hours. Cardiac Enzymes:  No results for input(s): CKTOTAL, CKMB, CKMBINDEX, TROPONINI in the last 72 hours. Lactic Acid:  No results found for: LACTA  BNP:   No results found for: BNP  D-Dimer:  Lab Results   Component Value Date    DDIMER 0.86 03/19/2022     Others:   Lab Results   Component Value Date    TSH 2.36 02/23/2019     Lab Results   Component Value Date    LAUREN NEGATIVE 05/07/2019    CRP 8.0 (H) 06/09/2018     No results found for: Amy Gun  Lab Results   Component Value Date    IRON 34 (L) 04/23/2020    TIBC 235 (L) 04/23/2020    FERRITIN 275 (H) 04/23/2020     No results found for: SPEP, UPEP  No results found for: PSA, CEA, , IH9962,     Input/Output:    Intake/Output Summary (Last 24 hours) at 3/27/2022 1223  Last data filed at 3/26/2022 1838  Gross per 24 hour   Intake    Output 1025 ml   Net -1025 ml       Microbiology:  No results for input(s): SPECDESC, SPECDESC, SPECIAL, CULTURE, CULTURE, STATUS, ORG, CDIFFTOXPCR, CAMPYLOBPCR, SALMONELLAPC, SHIGAPCR, SHIGELLAPCR, MPNEUG, MPNEUM, LACTOQL in the last 72 hours. Pathology:    Radiology reports:  XR CHEST PORTABLE   Final Result   Improvement in the the right basal infiltrate. FL MODIFIED BARIUM SWALLOW W VIDEO   Final Result   No penetration or aspiration with the above administered substances. Please see separate speech pathology report for full discussion of findings   and recommendations. XR CHEST PORTABLE   Final Result   Findings suggestive of worsening bibasilar pneumonia.          CT CHEST PULMONARY EMBOLISM W CONTRAST   Final Result   No evidence of pulmonary embolism. There is patchy ill-defined opacification   lower lung fields bilaterally suggesting infiltrate with ground-glass opacity   concerning for pneumonia as well in the upper lung fields. Reactive   adenopathy throughout the mediastinum and hilar regions. RECOMMENDATIONS:   Unavailable         VL DUP LOWER EXTREMITY VENOUS BILATERAL   Final Result      XR CHEST PORTABLE   Final Result   Stable cardiomegaly with mild pulmonary vascular congestion. Echocardiogram:   Results for orders placed during the hospital encounter of 02/22/19    ECHO Complete 2D W Doppler W Color    Narrative  Transthoracic Echocardiography Report (TTE)    Patient Name PRICE       Date of Study               02/25/2019  Sanderson Lily    Date of      1975  Gender                      Female  Birth    Age          37 year(s)  Race                        Black    Room Number  3001        Height:                     60 inch, 152.4 cm    Corporate ID [id number]  Weight:                     412 pounds, 186.9 kg  #    Patient Acct [de-identified]   BSA:          2.54 m^2      BMI:      80.46  #                                                              kg/m^2    MR #         [id number]     Sonographer                 Bambi Ray    Accession #  [id number]   Interpreting Physician      [facility]    Fellow                   Referring Nurse  Practitioner    Interpreting             Referring Physician         Shea Ferraro    Type of Study    TTE procedure:2D Echocardiogram, M-Mode, Doppler, Color Doppler. Procedure Date  Date: 02/25/2019 Start: 07:44 AM    Study Location: [facility]  Technical Quality: Poor visualization due to body habitus. History / Tech. Comments:  Procedure explained to patient. Very technically difficult study due to body  habitus.   CHF, COPD, HTN, Chest pain, Asthma, Morbid Obesity, STEPH    Patient Status: Inpatient    Height: 60 inches Weight: valve is grossly normal in structure and function. No pulmonic insufficiency seen  Pericardial Effusion  No pericardial effusion seen. Miscellaneous  Normal aortic root dimension. E/E'' = 10.75. IVC normal diameter & with impaired inspiratory collapse indicating mildly  elevated RA filling pressure . Subcostal views are not well visualized. M-mode / 2D Measurements & Calculations:    LVIDd:4.6 cm(3.7 - 5.6 cm)       Diastolic XTFJLZ:06.2 ml  BFORN:2.9 cm(2.2 - 4.0 cm)       Systolic DYCHB.1 ml  YBWV:7.3 cm(0.6 - 1.1 cm)        Aortic Root:2.5 cm(2.0 - 3.7 cm)  LVPWd:1.2 cm(0.6 - 1.1 cm)       LA Dimension: 3.5 cm(1.9 - 4.0 cm)  Fractional Shortenin.96 %    LA volume/Index: 67.93 ml /27m^2  Calculated LVEF (%): 74.92 %     LVOT:1.8 cm  RVDd:4 cm    Mitral:                                 Aortic    Valve Area (P1/2-Time): 2.16 cm^2       Peak Velocity: 1.84 m/s  Peak E-Wave: 0.99 m/s                   Mean Velocity: 1.24 m/s  Peak A-Wave: 0.68 m/s                   Peak Gradient: 13.54 mmHg  E/A Ratio: 1.44                         Mean Gradient: 7 mmHg  Peak Gradient: 3.88 mmHg  Mean Gradient: 2 mmHg  Deceleration Time: 310 msec             Area (continuity): 1.71 cm^2  P1/2t: 102 msec                         AV VTI: 41.7 cm    Area (continuity): 1.91 cm^2  Mean Velocity: 0.58 m/s    Tricuspid:                              Pulmonic:    Estimated RVSP: 44 mmHg                 Peak Velocity: 1.41 m/s  Peak TR Velocity: 2.71 m/s              Peak Gradient: 7.95 mmHg  Peak TR Gradient: 29.3764 mmHg  Estimated RA Pressure: 15 mmHg    Estimated PASP: 44.38 mmHg    Diastology / Tissue Doppler  Septal Wall E' velocity:0.10 m/s  Septal Wall E/E':10.2  Lateral Wall E' velocity:0.09 m/s  Lateral Wall E/E':11.3      Cardiac Catheterization:   No results found for this or any previous visit.       ASSESSMENT AND PLAN     Assessment:    //Acute on chronic hypoxic hypercapnic respiratory failure  //Right lower lobe pneumonia  //COPD exacerbation improved  //Bilateral groundglass infiltrate possibly atypical infection versus pulmonary edema  //Obesity hypoventilation syndrome  //Obstructive sleep apnea  //Severe pulmonary hypertension group 2/3 with cor pulmonale acute on chronic  //Acute on chronic cor pulmonale  //Chronic diastolic CHF last echo 5162  //Hypertension  //Morbid obesity. Plan:    Continue bipap   Will benefit from trilogy due to copd , hypercapnia. Signed prescription for Trilogy  Patient will benefit from 10 letter oxygen concentrator for home at discharge  Continue with Symbicort and Spiriva  Encourage incentive spirometry, pulmonary toilet, aspiration precautions and ambulation out of bed to chair  Currently on Bumex 2 mg twice daily would recommend monitoring of electrolytes, renal function management per primary service. Continue to monitor I/O with a goal of negative fluid balance  Antimicrobials reviewed; finished levofloxacin  Continue prednisone at current dose short course of 5 days  DVT prophylaxis on Lovenox 30 mg twice daily. Physical/occupational/speech therapy; increase activity as tolerated      Discussed with nursing staff, treatment and plan discussed  I updated the patient and son regarding the current clinical condition, provisional diagnosis and management plan. I addressed concerns and answered all questions to the best of my abilities. It was my pleasure to evaluate Debbie Marts today. We will continue to follow. I would like to thank you for allowing me to participate in the care of this patient. Please feel free to call with any further questions or concerns. Roberta Glover MD  PGY-3, Internal Medicine Resident  [address] [facility]  3/27/2022 12:26 PM      Please note that this chart was generated using voice recognition Dragon dictation software.   Although every effort was made to ensure the accuracy of this automated transcription, some errors in transcription may have occurred. Attending Physician Statement  I have discussed the care of [address] [place] [address], including pertinent history and exam findings,  with the resident. I have seen and examined the patient and the key elements of all parts of the encounter have been performed by me. I agree with the assessment, plan and orders as documented by the resident with additions . Treatment plan Discussed with nursing staff in detail , all questions answered . Electronically signed by Sandhya Frye MD on   3/27/22 at 3:27 PM EDT    Please note that this chart was generated using voice recognition Dragon dictation software. Although every effort was made to ensure the accuracy of this automated transcription, some errors in transcription may have occurred.

## 2022-03-27 NOTE — DISCHARGE INSTR - COC
Continuity of Care Form    Patient Name: Shannan Liu   :  1975  MRN:  [id number]    Admit date:  3/18/2022  Discharge date:  2022    Code Status Order: Full Code   Advance Directives:      Admitting Physician:  Johnson Galloway MD  PCP: Alexandru Hart DO    Discharging Nurse: Sourav Lopez RN  [address] Unit/Room#:   Discharging Unit Phone Number: [phone]    Emergency Contact:   Extended Emergency Contact Information  Primary Emergency Contact: Radu Velásquez   [facility] [address] Phone: [phone]  Relation: Parent  Secondary Emergency Contact: [facility]  Mobile Phone: [phone]  Relation: Brother/Sister    Past Surgical History:  Past Surgical History:   Procedure Laterality Date    ABDOMEN SURGERY      Patient had a PEG tube placed 2018, removed 2018    [facility]  2019    CYSTOSCOPY N/A 2019    CYSTOSCOPY performed by Shayna Cordova MD at [address]  2018    Removed in 2018    [facility] CATH POWER PICC TRIPLE  2018         JOINT REPLACEMENT      ID OFFICE/OUTPT VISIT,PROCEDURE ONLY N/A 2018    TRACHEOTOMY performed by Kelly Jacobo MD at [address] [facility]  2018    TRACHEOTOMY  2018       Immunization History: There is no immunization history on file for this patient.     Active Problems:  Patient Active Problem List   Diagnosis Code    Asthma J45.909    Pneumonia J18.9    HTN (hypertension) I10    Torn meniscus S83.209A    Chest pain R07.9    Pulmonary hypertension, moderate to severe (HCC) I27.20    Morbid obesity with BMI of 50.0-59.9, adult (HCC) E66.01, Z68.43    Obesity hypoventilation syndrome (HCC) E66.2    H/O tracheostomy Z98.890    STEPH (obstructive sleep apnea) G47.33    Right heart failure, unspecified ([facility]) I50.810    Acute on chronic diastolic heart failure (HCC) I50.33    Acute on chronic congestive heart failure ([address] I50.9    Diabetes mellitus, new onset ([address] E11.9    Dizziness R42    Normocytic anemia D64.9    Pneumonia of both lower lobes due to infectious organism J18.9    NSTEMI (non-ST elevated myocardial infarction) ([facility]) I21.4    Acute pulmonary edema ([facility]) J81.0    Hypertensive emergency I16.1    Acute respiratory failure with hypoxia and hypercapnia ([facility]) J96.01, J96.02    Smoker F17.200    Morbid obesity ([facility]) E66.01    SOB (shortness of breath) R06.02    COPD exacerbation ([facility]) J44.1    ARDS (adult respiratory distress syndrome) ([facility]) J80    Fever R50.9    Disease due to rhinovirus B34.8    Encounter for PEG (percutaneous endoscopic gastrostomy) ([facility] Z43.1    Status post tracheostomy ([facility] Z93.0    Status post insertion of percutaneous endoscopic gastrostomy (PEG) tube ([facility]) Z93.1    Rhinovirus infection B34.8    Acute non-recurrent pansinusitis J01.40    Chronic respiratory failure ([facility] J96.10    Pulmonary hypertension ([facility] I27.20    Chronic narcotic dependence ([facility] F11.20    Chronically on benzodiazepine therapy Z79.899    Neuropathy G62.9    Epigastric pain R10.13    Muscle spasm M62.838    Dyspnea R06.00    Prediabetes R73.03    Hyperlipidemia E78.5    Hypokalemia E87.6    Hypomagnesemia E83.42       Isolation/Infection:   Isolation            No Isolation          Patient Infection Status       Infection Onset Added Last Indicated Last Indicated By Review Planned Expiration Resolved Resolved By    None active    Resolved    COVID-19 (Rule Out) 03/19/22 03/19/22 03/19/22 COVID-19 (Ordered)   03/20/22 Rule-Out Test Resulted    COVID-19 (Rule Out) 03/18/22 03/18/22 03/18/22 COVID-19, Rapid (Ordered)   03/18/22 Rule-Out Test Resulted            Nurse Assessment:  Last Vital Signs: /70   Pulse 82   Temp 98.2 °F (36.8 °C) (Oral)   Resp 23   Ht 5' 6\" (1.676 m)   Wt (!) 355 lb (161 kg)   SpO2 (!) 88%   BMI 57.30 kg/m²     Last documented pain score (0-10 scale): Pain Level: 8  Last Weight:    Wt Readings from Last 1 Encounters:   03/19/22 (!) 355 lb (161 kg)     Mental Status:  oriented    IV Access:  - None    Nursing Mobility/ADLs:  Walking   Assisted  Transfer  Assisted  Bathing  Assisted  Dressing  Assisted  Toileting  Assisted  Feeding  [address]  Independent  Med Delivery   whole    Wound Care Documentation and Therapy:        Elimination:  Continence: Bowel: Yes  Bladder: No-Patient has urgency and wears briefs at home. Urinary Catheter: None   Colostomy/Ileostomy/Ileal Conduit: Yes and No       Date of Last BM: 03-    Intake/Output Summary (Last 24 hours) at 3/27/2022 1248  Last data filed at 3/26/2022 7967  Gross per 24 hour   Intake --   Output 1025 ml   Net -1025 ml     I/O last 3 completed shifts:  In: -   Out: 1875 [Urine:1875]    Safety Concerns:     History of Falls (last 30 days) and At Risk for Falls    Impairments/Disabilities:      obesity    Nutrition Therapy:  Current Nutrition Therapy:   - Oral Diet:  Carb Control 4 carbs/meal (1800kcals/day) and Low Sodium (2gm)    Routes of Feeding: Oral  Liquids:  Thin Liquids  Daily Fluid Restriction: yes - amount 1500 ml/24 hours  Last Modified Barium Swallow with Video (Video Swallowing Test): not done    Treatments at the Time of Hospital Discharge:   Respiratory Treatments: Inhalers   Trilogy as ordered  See MAR  Oxygen Therapy:  Home oxygen per NC   Ventilator: Trilogy   - ***    Rehab Therapies: Physical Therapy and Occupational Therapy  Weight Bearing Status/Restrictions: No weight bearing restrictions  Other Medical Equipment (for information only, NOT a DME order):  walker  Other Treatments: skilled nursing    Patient's personal belongings (please select all that are sent with patient):  Cell phone and     RN SIGNATURE:  Jazzy Merida RN    CASE MANAGEMENT/SOCIAL WORK SECTION    Inpatient Status Date: 3/18/22    Readmission Risk Assessment Score:  Readmission Risk              Risk of Unplanned Readmission:  37 Discharging to [facility] [address] [address]         Phone: [phone]          / signature: Electronically signed by Yolanda Garrett RN on 3/28/22 at 3:19 PM EDT    PHYSICIAN SECTION    Prognosis: Good    Condition at Discharge: [address] Patient Condition:[id number]}    Rehab Potential (if transferring to Rehab): {Prognosis:[id number]}    Recommended Labs or Other Treatments After Discharge[facility]    Physician Certification: I certify the above information and transfer of Xiang Maxwell  is necessary for the continuing treatment of the diagnosis listed and that she requires Home Care for greater 30 days.      Update Admission H&P: No change in H&P    PHYSICIAN SIGNATURE:  Electronically signed by Olaf Bueno MD on 3/27/22 at 2:11 PM EDT

## 2022-03-27 NOTE — PROGRESS NOTES
Pt educated on needing urine cx, pt refused at this time, stating she would prefer to have it collected tomorrow during the day.

## 2022-03-27 NOTE — PROGRESS NOTES
PATIENT HAS CHRONIC RESPIRATORY failure SECONDARY TO COPD AND HAS CHRONIC CO2 RETENTION AND REQUIRES HOME VENTILATOR TO MAINTAIN ACCEPTABLE Pa CO2 LEVELS TO [address] . IF Pa CO2 LEVELS ARE NOT MAINTAINED THIS MAY CAUSE HARM OR POSSIBLE DEATH TO THE PATIENT . DUE TO PATIENTS CHRONIC CONDITION , BIPAP THERAPY HAS BEEN CONSIDERED AND RULED OUT .   Electronically signed by Yojana Dozier MD on 3/27/2022 at 11:05 AM

## 2022-03-27 NOTE — PROGRESS NOTES
CLINICAL PHARMACY NOTE: MEDS TO BEDS    Total # of Prescriptions Filled: 2   The following medications were delivered to the patient:  · Prednisone 20mg  · Amlodipine 5mg    Additional Documentation: delivered to patient in room 2008 3/27 at 2:12pm. No co-pay.

## 2022-03-27 NOTE — PLAN OF CARE
Problem: Pain:  Goal: Pain level will decrease  Description: Pain level will decrease  3/27/2022 1547 by Donte Jauregui RN  Outcome: Ongoing  3/27/2022 0507 by Eduard Norwood RN  Outcome: Ongoing  Goal: Control of acute pain  Description: Control of acute pain  3/27/2022 1547 by Donte Jauregui RN  Outcome: Ongoing  3/27/2022 0507 by Eduard Norwood RN  Outcome: Ongoing  Goal: Control of chronic pain  Description: Control of chronic pain  3/27/2022 1547 by Donte Jauregui RN  Outcome: Ongoing  3/27/2022 0507 by Eduard Norwood RN  Outcome: Ongoing

## 2022-03-27 NOTE — PLAN OF CARE
Problem: OXYGENATION/RESPIRATORY FUNCTION  Goal: Patient will achieve/maintain normal respiratory rate/effort  Description: Respiratory rate and effort will be within normal limits for the patient  3/27/2022 1805 by Alessandra Carlin RCP  Outcome: Ongoing     Problem: OXYGENATION/RESPIRATORY FUNCTION  Goal: Patient will maintain patent airway  3/27/2022 1805 by Alessandra Carlin RCP  Outcome: Ongoing   BRONCHOSPASM/BRONCHOCONSTRICTION   [x]  IMPROVE AERATION/BREATH SOUNDS  [x]   ADMINISTER BRONCHODILATOR THERAPY AS APPROPRIATE  [x]   ASSESS BREATH SOUNDS  []   IMPLEMENT AEROSOL/MDI PROTOCOL  [x]   PATIENT EDUCATION AS NEEDED

## 2022-03-27 NOTE — PROGRESS NOTES
[facility]  Internal Medicine Teaching Residency Program  Inpatient Daily Progress Note  ______________________________________________________________________________    Patient: Christina Barkley  YOB: 1975   JDS:[id number]    Acct: [de-identified]     Room: 2008/2008-01  Admit date: 3/18/2022  Today's date: 03/27/22  Number of days in the hospital: 9    SUBJECTIVE   Admitting Diagnosis: Pneumonia  CC: Shortness of breath     - Pt examined at bedside. Chart & results reviewed. - No acute events over  - Patient resting comfortably in bed on BiPAP this morning  - Patient denies any shortness of breath or difficulty breathing  - Saturating well  - Afebrile  - Vital signs stable    Plan for today:  - Urine analysis with reflex to culture  - Possible discharge today pending urine analysis and culture, patient maintaining saturation between 88 to 92% on 4 L nasal cannula. Patient is concerned her concentrator at home is broke and may need home O2 before discharge. ROS:  Constitutional:  negative for chills, fevers, sweats  Respiratory:  negative for cough, dyspnea on exertion, hemoptysis, shortness of breath, wheezing  Cardiovascular:  negative for chest pain, chest pressure/discomfort, lower extremity edema, palpitations  Gastrointestinal:  negative for abdominal pain, constipation, diarrhea, nausea, vomiting  Neurological:  negative for dizziness, headache    BRIEF HISTORY     The patient is a pleasant 46 y.o. female presents with a chief complaint of shortness of breath.  Past medical history significant for CHF, HIEN, COPD on 3-4 L of oxygen, asthma, hyperlipidemia, hypertension.  Patient states that she usually uses 3 to 4 L of oxygen.  Patient was brought in through the EMS when she became significantly short of breath this morning and her saturation dropped to low 70s and high 60s. .  According to the EMS, patient's oxygen tubing was kinked and she was saturating at 70%.  This improved with new tubing and patient's oxygen saturation improved to 90%.  Patient states that EMS placed her on a nonrebreather mask.     Patient states that she experiences chest pain occasionally- the pain is substernal, is at rest and sometimes pain decreases with changing position.  Patient states that the pain is sudden in onset describes the character as a pressure-like sensation, it radiates to the back. OBJECTIVE     Vital Signs:  /64   Pulse 94   Temp 98.7 °F (37.1 °C) (Axillary)   Resp 18   Ht 5' 6\" (1.676 m)   Wt (!) 355 lb (161 kg)   SpO2 (!) 89%   BMI 57.30 kg/m²     Temp (24hrs), Av.2 °F (36.8 °C), Min:97.5 °F (36.4 °C), Max:98.7 °F (37.1 °C)    In: -   Out:  [Urine:]    Physical Exam:  Physical Exam  Vitals and nursing note reviewed. Constitutional:       Appearance: She is obese. HENT:      Head: Normocephalic and atraumatic. Nose: Nose normal.      Mouth/Throat:      Mouth: Mucous membranes are moist.      Pharynx: Oropharynx is clear. Eyes:      Extraocular Movements: Extraocular movements intact. Conjunctiva/sclera: Conjunctivae normal.      Pupils: Pupils are equal, round, and reactive to light. Cardiovascular:      Rate and Rhythm: Normal rate and regular rhythm. Pulses: Normal pulses. Heart sounds: Normal heart sounds. No murmur heard. No friction rub. No gallop. Pulmonary:      Effort: Pulmonary effort is normal. No respiratory distress. Breath sounds: Normal breath sounds. No stridor. No wheezing, rhonchi or rales. Chest:      Chest wall: No tenderness. Abdominal:      General: Abdomen is flat. Bowel sounds are normal. There is no distension. Palpations: Abdomen is soft. There is no mass. Tenderness: There is no abdominal tenderness. There is no guarding or rebound. Hernia: No hernia is present. Musculoskeletal:         General: No swelling, tenderness, deformity or signs of injury. Normal range of motion. Cervical back: Normal range of motion. Right lower leg: No edema. Left lower leg: No edema. Skin:     General: Skin is warm. Neurological:      General: No focal deficit present. Mental Status: She is alert and oriented to person, place, and time. Mental status is at baseline. Psychiatric:         Mood and Affect: Mood normal.         Behavior: Behavior normal.         Thought Content:  Thought content normal.         Judgment: Judgment normal.           Medications:  Scheduled Medications:    bumetanide  2 mg Oral BID    insulin lispro  0-6 Units SubCUTAneous TID WC    insulin lispro  0-3 Units SubCUTAneous Nightly    predniSONE  20 mg Oral Daily    docusate sodium  100 mg Oral Daily    hydrALAZINE  25 mg Oral 3 times per day    sodium chloride flush  5-40 mL IntraVENous 2 times per day    enoxaparin  30 mg SubCUTAneous BID    amLODIPine  5 mg Oral Daily    atorvastatin  40 mg Oral Nightly    budesonide-formoterol  2 puff Inhalation BID    fluticasone  1 spray Nasal Daily    folic acid  1 mg Oral Daily    gabapentin  300 mg Oral TID    isosorbide dinitrate  20 mg Oral TID    pantoprazole  40 mg Oral QAM AC    sertraline  100 mg Oral Daily    spironolactone  25 mg Oral Daily    tiotropium  2 puff Inhalation Daily    guaiFENesin  600 mg Oral BID     Continuous Infusions:    dextrose      sodium chloride 25 mL (03/20/22 0840)     PRN Medicationsalbuterol sulfate HFA, 2 puff, Q6H PRN  LORazepam, 1 mg, Q4H PRN  glucose, 15 g, PRN  dextrose, 12.5 g, PRN  glucagon (rDNA), 1 mg, PRN  dextrose, 100 mL/hr, PRN  sodium chloride flush, 5-40 mL, PRN  sodium chloride, 25 mL, PRN  ondansetron, 4 mg, Q8H PRN   Or  ondansetron, 4 mg, Q6H PRN  polyethylene glycol, 17 g, Daily PRN  acetaminophen, 650 mg, Q6H PRN   Or  acetaminophen, 650 mg, Q6H PRN  oxyCODONE-acetaminophen, 2 tablet, Q6H PRN  sodium chloride, 1 spray, PRN        Diagnostic Labs:  CBC:   Recent Labs     03/24/22 0442 03/25/22 0349 03/26/22  0259   WBC 10.0 9.7 9.7   RBC 3.69* 4.00 3.97   HGB 9.3* 10.1* 10.1*   HCT 32.2* 36.3 35.1*   MCV 87.3 90.8 88.4   RDW 15.3* 15.3* 15.5*    261 259     BMP:   Recent Labs     03/24/22 0442 03/25/22 0349 03/26/22 0259    137 133*   K 3.7 3.6* 4.2   CL 93* 90* 88*   CO2 34* 33* 32*   BUN 33* 35* 33*   CREATININE 1.09* 1.05* 0.91*     BNP: No results for input(s): BNP in the last 72 hours. PT/INR: No results for input(s): PROTIME, INR in the last 72 hours. APTT: No results for input(s): APTT in the last 72 hours. CARDIAC ENZYMES: No results for input(s): CKMB, CKMBINDEX, TROPONINI in the last 72 hours. Invalid input(s): CKTOTAL;3  FASTING LIPID PANEL:  Lab Results   Component Value Date    CHOL 144 11/17/2018    HDL 42 10/07/2020    TRIG 68 11/17/2018     LIVER PROFILE: No results for input(s): AST, ALT, ALB, BILIDIR, BILITOT, ALKPHOS in the last 72 hours. MICROBIOLOGY:   Lab Results   Component Value Date/Time    CULTURE NO GROWTH 6 DAYS 06/29/2021 07:03 PM       Imaging:    XR CHEST PORTABLE    Result Date: 3/22/2022  Improvement in the the right basal infiltrate. ASSESSMENT & PLAN     Assessment and Plan:    Principal Problem:    Pneumonia  Active Problems:    HTN (hypertension)    COPD exacerbation (HCC)    Pulmonary hypertension, moderate to severe (HCC)    Morbid obesity with BMI of 50.0-59.9, adult (HCC)    STEPH (obstructive sleep apnea)    Normocytic anemia    Dyspnea    Prediabetes    Hyperlipidemia    Hypokalemia    Hypomagnesemia  Resolved Problems:    * No resolved hospital problems.  *      Community acquired pneumonia - Levaquin course completed 3/26  Acute on chronic CHF; HFpEF EF 75% 02/2019; resumed home isordil 20 mg tid and aldactone 25 mg qd; Transition to PO Bumex 2mg BID. Echo result indicative of EF of 60% with severe pulmonary hypertension  COPD exacerbation - pulmonology on board; continue albuterol, symbicort, spiriva. Started on predisone 20 mg for 5 days completed. Pulmonology following. Recommendations appreciated. Trilogy vent recommended for discharge.  Continue to wean oxygen as tolerated. Goal to get patient back on 4 to 5 L via nasal cannula. Acute on chronic hypoxic respiratory failure secondary to #2 and #3  HTN - Norvasc 5 mg qd, hydralazine 25 mg q8h. Will consider optimization of meds with addition of Lisinopril due to DM   HLD - Lipitor 40 mg qd  Type 2 DM HbA1C 6.4 03/22- Sugars well controlled. Neither Lantus nor Sliding scale Insulin needed currently. Hypoglycemia protocol. MDD - Zoloft  Morbid obesity - Counseled      DVT ppx: Lovenox  GI ppx: Protonix     PT/OT/SW: On board  Discharge Planning: Possible discharge today pending urine analysis and patient able to maintain saturation between 88 to 92% on 4 L of oxygen via nasal cannula. Patient may need home O2 before discharge as patient's concentrator is broken at home. Елена Roy MD  Internal Medicine Resident, PGY-1  [address], [place]   12:18 AM 3/27/2022     Please note that part of this chart was generated using voice recognition dictation software. Although every effort was made to ensure the accuracy of this automated transcription, some errors in transcription may have occurred. I have discussed the care of [address] [place] [address] , including pertinent history and exam findings,    today with the resident. I have seen and examined the patient and the key elements of all parts of the encounter have been performed by me . I agree with the assessment, plan and orders as documented by the resident.      Principal Problem:    Pneumonia  Active Problems:    HTN (hypertension)    COPD exacerbation (HCC)    Pulmonary hypertension, moderate to severe ([address]    Morbid obesity with BMI of 50.0-59.9, adult (HCC)    STEPH (obstructive sleep apnea)    Normocytic anemia    Dyspnea    Prediabetes    Hyperlipidemia    Hypokalemia Hypomagnesemia  Resolved Problems:    * No resolved hospital problems. *        Overall  course ;                                   are improving over time.         Patient clinically doing better  UA seen, has hematuria, likely suction trauma from catheter  DC plan, will need home ventilator          Electronically signed by Dagoberto Cho MD

## 2022-03-28 NOTE — PLAN OF CARE
Problem: OXYGENATION/RESPIRATORY FUNCTION  Goal: Patient will maintain patent airway  3/28/2022 0600 by Yonas Mariscal RCP  Outcome: Ongoing     Problem: OXYGENATION/RESPIRATORY FUNCTION  Goal: Patient will achieve/maintain normal respiratory rate/effort  Description: Respiratory rate and effort will be within normal limits for the patient  3/28/2022 0600 by Yonas Mariscal RCP  Outcome: Ongoing     Problem: Respiratory  Intervention: Respiratory assessment  Note:   PROVIDE ADEQUATE OXYGENATION WITH ACCEPTABLE SP02/ABG'S    [x]  IDENTIFY APPROPRIATE OXYGEN THERAPY  [x]   MONITOR SP02/ABG'S AS NEEDED   [x]   PATIENT EDUCATION AS NEEDED   BRONCHOSPASM/BRONCHOCONSTRICTION     [x]         IMPROVE AERATION/BREATH SOUNDS  [x]   ADMINISTER BRONCHODILATOR THERAPY AS APPROPRIATE  [x]   ASSESS BREATH SOUNDS  []   IMPLEMENT AEROSOL/MDI PROTOCOL  [x]   PATIENT EDUCATION AS NEEDED

## 2022-03-28 NOTE — CARE COORDINATION
Met with pt to discuss transitional planning. She was current with [facility] prior to hospitalization. She is agreeable to resume care at discharge. Spoke to Jessica at [facility]. They are able to resume care at discharge. Voicemail left for intake at [facility] to provide update. Spoke to Fausto at [facility]. Noninvasive vent requires prior auth which could take up to 5 days. Pt also needs new order for home O2 for higher liter flow so that she can get the higher concentrator    1424 O2 order faxed to [place] [place] pt does not have mask for cpap that fits at home. Call to [facility]. Their records show that pt received 3 different masks in 2019 and she never showed documentation from a physician stating she was using and benefiting from the cpap, therefore, she would have to pay out of pocket for another mask. Met with pt. She does not know where any of those masks are and cannot afford to pay for mask out of pocket. PS sent to Dr Sheila Briceño for update    1625 per Dr Sheila Briceño pt will need noninvasive ventilation arranged for home before pt can be discharged. Fausto at [facility] notified.  They are able to deliver higher concentrator when pt is ready for discharge

## 2022-03-28 NOTE — PLAN OF CARE
Problem: Pain:  Goal: Pain level will decrease  Description: Pain level will decrease  Outcome: Ongoing  Goal: Control of acute pain  Description: Control of acute pain  Outcome: Ongoing  Goal: Control of chronic pain  Description: Control of chronic pain  3/28/2022 1118 by Mitchell Parsons RN  Outcome: Ongoing  3/28/2022 0308 by Roseline Vasquez RN  Outcome: Ongoing     Problem: Falls - Risk of:  Goal: Will remain free from falls  Description: Will remain free from falls  3/28/2022 1118 by Mitchell Parsons RN  Outcome: Ongoing  3/28/2022 0308 by Fabiana Bauman RN  Outcome: Ongoing  Goal: Absence of physical injury  Description: Absence of physical injury  Outcome: Ongoing     Problem: OXYGENATION/RESPIRATORY FUNCTION  Goal: Patient will maintain patent airway  3/28/2022 1118 by Mitchell Parsons RN  Outcome: Ongoing  3/28/2022 0600 by Katie Myrick RCP  Outcome: Ongoing  3/28/2022 0308 by Fabiana Bauman RN  Outcome: Ongoing  Goal: Patient will achieve/maintain normal respiratory rate/effort  Description: Respiratory rate and effort will be within normal limits for the patient  3/28/2022 1118 by Mitchell Parsons RN  Outcome: Ongoing  3/28/2022 0600 by Katie Myrick RCP  Outcome: Ongoing     Problem: Skin Integrity:  Goal: Will show no infection signs and symptoms  Description: Will show no infection signs and symptoms  3/28/2022 1118 by Mitchell Parsons RN  Outcome: Ongoing  3/28/2022 0308 by Fabiana Bauman RN  Outcome: Ongoing  Goal: Absence of new skin breakdown  Description: Absence of new skin breakdown  Outcome: Ongoing

## 2022-03-28 NOTE — PROGRESS NOTES
PULMONARY & CRITICAL CARE MEDICINE PROGRESS  NOTE     Patient:  Jodie Toth  MRN: [id number]  Admit date: 3/18/2022  Primary Care Physician: Joaquin Slater DO  Consulting Physician: Dagoberto Cho MD  CODE Status: Full Code  LOS: 10    SUBJECTIVE     I personally interviewed/examined the patient, reviewed interval history and interpreted all available radiographic, laboratory data at the time of service. Chief Compliant/Reason for Initial Consult:   Acute on chronic hypoxic hypercapnic hypercapnic respiratory failure  COPD/CHF exacerbation  Pneumonia/pulmonary edema    Brief Hospital Course: The patient is a 55 y.o. female presents with complaint of shortness of breath, associated with stuffy nose, productive cough, clear sputum. Patient desatted to high 60s and low 70s. According to EMS, patient's oxygen tubing was kinked and was saturating at 70%. Improved with new tubing. She was placed on nonrebreather. Patient also complained of occasional substernal chest pain pressure-like sensation radiating to back at the time of presentation. On presentation, patient afebrile hemodynamically stable saturating at 96% on high flow 90% FiO2 30 L/minute   Pertinent labs: proBNP 1086  WBC 11.5  Procalcitonin 0.25  Negative for COVID-19 testing  VBG: pH 7.26/PCO2 85/PO2 40.6/bicarb 37. Acute on chronic hypercapnic respiratory acidosis with appropriate metabolic compensation  Chest x-ray: Patchy airspace opacity right worse than left. CT PE: Negative for PE. Groundglass opacity present. Reactive adenopathy throughout mediastinum and hilar region. Patchy consolidation right lower lobe. Interval History:  03/28/22  Overnight events noted chart reviewed medications seen.   On 45 L nasal cannula maintaining saturation 90% or above sometimes she dipped to 88% at rest.  She did use BiPAP: Patient most of the night is on 14/8/50 percent and able to tolerate BiPAP overnight without much problem. According to patient to chair yesterday with help of physical therapy she is using bedside commode she has mild cough and no wheezing. She is on Bumex 2 mg oral twice daily and urine output reported to be 2500 mL in last 24 hours. Labs shows creatinine of 0.97 BUN is 36 bicarbonate 30 WBC 13.6 hemoglobin 10.4. Chest x-ray 2022 shows better aeration of right lower lung field right lower lobe infiltrate/atelectasis improved cardiomegaly present  Echocardiogram on 2022 shows severe pulmonary hypertension with estimated RVSP 80 with right ventricular dilatation and volume overload and moderate to severe TR    Review of Systems:  Review of Systems   Constitutional: Negative for fatigue and fever. HENT: Negative for postnasal drip, rhinorrhea, sore throat, trouble swallowing and voice change. Eyes: Negative for photophobia and redness. Respiratory: Positive for cough and shortness of breath. Negative for wheezing. Cardiovascular: Positive for leg swelling. Negative for chest pain. Gastrointestinal: Negative for abdominal pain, diarrhea and vomiting. Endocrine: Negative for polydipsia, polyphagia and polyuria. Genitourinary: Negative for difficulty urinating, dysuria, frequency and hematuria. Musculoskeletal: Negative. Allergic/Immunologic: Negative. Neurological: Negative for dizziness, syncope, speech difficulty and headaches. Hematological: Negative for adenopathy. Does not bruise/bleed easily. Psychiatric/Behavioral: Negative.         OBJECTIVE     VITAL SIGNS:   LAST-  /71   Pulse 86   Temp 98.3 °F (36.8 °C) (Oral)   Resp 21   Ht 5' 6\" (1.676 m)   Wt (!) 355 lb (161 kg)   SpO2 90%   BMI 57.30 kg/m²   8-24 HR RANGE-  TEMP Temp  Av.4 °F (36.9 °C)  Min: 98.2 °F (36.8 °C)  Max: 98.7 °F (26.0 °C)   BP Systolic (07THR), OCJ:072 , Min:113 , OKI:933      Diastolic (09FCX), OFY:15, Min:67, Max:87     PULSE Pulse  Avg: 89.6  Min: 84  Max: 95   RR Resp  Av.8  Min: 16  Max: 23   O2 SAT SpO2  Av.3 %  Min: 88 %  Max: 93 %   OXYGEN DELIVERY O2 Flow Rate (L/min)  Av L/min  Min: 5 L/min  Max: 5 L/min     Systemic Examination:   Physical Exam  General appearance - looks comfortable and in mildly tachypneic and not in acute distress  Mental status - alert, oriented to person, place, and time  Eyes - pupils equal and reactive, extraocular eye movements intact  Mouth - mucous membranes moist, pharynx normal without lesions, Mallampati 2  Neck -Short thick neck supple, no significant adenopathy  Chest - Chest was symmetrical without dullness to percussion. Air entry is severely reduced and distant bilaterally with no rhonchi, no expiratory wheezing no crackles present.  There is no intercostal recession or use of accessory muscles  Heart - normal rate, regular rhythm, normal S1, S2, no murmurs, rubs, clicks or gallops  Abdomen - soft, nontender, nondistended, no masses or organomegaly  Neurological - alert, oriented, normal speech, no focal findings or movement disorder noted  Extremities - peripheral pulses normal, positive pedal edema, no clubbing or cyanosis  Skin - normal coloration and turgor, no rashes, no suspicious skin lesions noted     DATA REVIEW     Medications:  Scheduled Meds:   bumetanide  2 mg Oral BID    insulin lispro  0-6 Units SubCUTAneous TID     insulin lispro  0-3 Units SubCUTAneous Nightly    predniSONE  20 mg Oral Daily    docusate sodium  100 mg Oral Daily    hydrALAZINE  25 mg Oral 3 times per day    sodium chloride flush  5-40 mL IntraVENous 2 times per day    enoxaparin  30 mg SubCUTAneous BID    amLODIPine  5 mg Oral Daily    atorvastatin  40 mg Oral Nightly    budesonide-formoterol  2 puff Inhalation BID    fluticasone  1 spray Nasal Daily    folic acid  1 mg Oral Daily    gabapentin  300 mg Oral TID    isosorbide dinitrate  20 mg Oral TID    pantoprazole  40 mg Oral QAM AC    sertraline  100 mg Oral Daily    spironolactone  25 mg Oral Daily    tiotropium  2 puff Inhalation Daily    guaiFENesin  600 mg Oral BID     Continuous Infusions:   dextrose      sodium chloride 25 mL (03/20/22 0840)     LABS:-  ABG:   No results for input(s): POCPH, POCPCO2, POCPO2, POCHCO3, GUFN4YVB in the last 72 hours. CBC:   Recent Labs     03/26/22  0259 03/27/22  0559 03/28/22  0415   WBC 9.7 11.5* 13.6*   HGB 10.1* 9.7* 10.4*   HCT 35.1* 33.7* 35.8*   MCV 88.4 88.2 88.4    273 271   LYMPHOPCT 10* 21* 20*   RBC 3.97 3.82* 4.05   MCH 25.4 25.4 25.7   MCHC 28.8 28.8 29.1   RDW 15.5* 15.6* 15.7*     BMP:   Recent Labs     03/26/22  0259 03/27/22  0559 03/28/22  0501   * 137 136   K 4.2 3.5* 3.9   CL 88* 91* 91*   CO2 32* 36* 30   BUN 33* 30* 36*   CREATININE 0.91* 0.88 0.97*   GLUCOSE 225* 186* 225*     Liver Function Test:   No results for input(s): PROT, LABALBU, ALT, AST, GGT, ALKPHOS, BILITOT in the last 72 hours. Amylase/Lipase:  No results for input(s): AMYLASE, LIPASE in the last 72 hours. Coagulation Profile:   No results for input(s): INR, PROTIME, APTT in the last 72 hours. Cardiac Enzymes:  No results for input(s): CKTOTAL, CKMB, CKMBINDEX, TROPONINI in the last 72 hours.   Lactic Acid:  No results found for: LACTA  BNP:   No results found for: BNP  D-Dimer:  Lab Results   Component Value Date    DDIMER 0.86 03/19/2022     Others:   Lab Results   Component Value Date    TSH 2.36 02/23/2019     Lab Results   Component Value Date    LAUREN NEGATIVE 05/07/2019    CRP 8.0 (H) 06/09/2018     No results found for: Vernida Decree  Lab Results   Component Value Date    IRON 34 (L) 04/23/2020    TIBC 235 (L) 04/23/2020    FERRITIN 275 (H) 04/23/2020     No results found for: SPEP, UPEP  No results found for: PSA, CEA, , NS8288,     Input/Output:    Intake/Output Summary (Last 24 hours) at 3/28/2022 1019  Last data filed at 3/28/2022 0803  Gross per 24 hour   Intake 1140 ml Output 2500 ml   Net -1360 ml       Microbiology:  No results for input(s): SPECDESC, SPECDESC, SPECIAL, CULTURE, CULTURE, STATUS, ORG, CDIFFTOXPCR, CAMPYLOBPCR, SALMONELLAPC, SHIGAPCR, SHIGELLAPCR, MPNEUG, MPNEUM, LACTOQL in the last 72 hours. Pathology:    Radiology reports:  XR CHEST PORTABLE   Final Result   Improvement in the the right basal infiltrate. FL MODIFIED BARIUM SWALLOW W VIDEO   Final Result   No penetration or aspiration with the above administered substances. Please see separate speech pathology report for full discussion of findings   and recommendations. XR CHEST PORTABLE   Final Result   Findings suggestive of worsening bibasilar pneumonia. CT CHEST PULMONARY EMBOLISM W CONTRAST   Final Result   No evidence of pulmonary embolism. There is patchy ill-defined opacification   lower lung fields bilaterally suggesting infiltrate with ground-glass opacity   concerning for pneumonia as well in the upper lung fields. Reactive   adenopathy throughout the mediastinum and hilar regions. RECOMMENDATIONS:   Unavailable         VL DUP LOWER EXTREMITY VENOUS BILATERAL   Final Result      XR CHEST PORTABLE   Final Result   Stable cardiomegaly with mild pulmonary vascular congestion.              Echocardiogram:   Results for orders placed during the hospital encounter of 02/22/19    ECHO Complete 2D W Doppler W Color    Narrative  Transthoracic Echocardiography Report (TTE)    Patient Name PRICE       Date of Study               02/25/2019  Blima Bath    Date of      1975  Gender                      Female  Birth    Age          37 year(s)  Race                        Black    Room Number  3001        Height:                     60 inch, 152.4 cm    Corporate ID [id number]  Weight:                     412 pounds, 186.9 kg  #    Patient Acct [de-identified]   BSA:          2.54 m^2      BMI:      80.46  # kg/m^2    MR #         [id number]     Lj Staley    Accession #  [id number]   Interpreting Physician      [facility]    Fellow                   Referring Nurse  Practitioner    Interpreting             Referring Physician         Shea Olson    Type of Study    TTE procedure:2D Echocardiogram, M-Mode, Doppler, Color Doppler. Procedure Date  Date: 02/25/2019 Start: 07:44 AM    Study Location: [facility]  Technical Quality: Poor visualization due to body habitus. History / Tech. Comments:  Procedure explained to patient. Very technically difficult study due to body  habitus. 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    - Sulfa. CONCLUSIONS    Summary  Technically difficult study. Left ventricle is normal in size. Mild concentric LVH Global left  ventricular systolic function appears hyperdynamic Calculated ejection  fraction is 75% . Mild left ventricular hypertrophy. Moderately dilated right ventricle size and severely decreased systolic  function. Estimated right ventricular systolic pressure is 44 mmHg. Moderate pulmonary hypertension. mildly elevated RA filling pressure . Signature  ----------------------------------------------------------------------------  Electronically signed by Isiah Marie(Interpreting physician) on  02/25/2019 03:15 PM  ----------------------------------------------------------------------------    ----------------------------------------------------------------------------  Electronically signed by Bambi Ray(Sonographer) on 02/25/2019  09:28 AM  ----------------------------------------------------------------------------  FINDINGS  Left Atrium  Left atrium is normal in size. Left Ventricle  Technically difficult study. Left ventricle is normal in size. Mld  concentric LVH Global left ventricular systolic function appears  hyperdynamic Calculated ejection fraction is 75% . Mild left ventricular hypertrophy. Right Atrium  Normal right atrial dimension. Right Ventricle  Moderately dilated right ventricle size and severely decreased systolic  function. Mitral Valve  Normal mitral valve structure. Trivial mitral regurgitation. No mitral stenosis. Aortic Valve  Aortic valve structure and function normal.  Aortic valve is trileaflet. No aortic insufficiency. No aortic stenosis. Tricuspid Valve  Normal tricuspid valve leaflets. Trivial tricuspid regurgitation. No tricuspid stenosis. Estimated right ventricular systolic pressure is 44 mmHg. Moderate pulmonary hypertension. Pulmonic Valve  Pulmonic valve is grossly normal in structure and function. No pulmonic insufficiency seen  Pericardial Effusion  No pericardial effusion seen. Miscellaneous  Normal aortic root dimension. E/E'' = 10.75. IVC normal diameter & with impaired inspiratory collapse indicating mildly  elevated RA filling pressure . Subcostal views are not well visualized.     M-mode / 2D Measurements & Calculations:    LVIDd:4.6 cm(3.7 - 5.6 cm)       Diastolic SWIMUK:72.5 ml  QWTBW:7.3 cm(2.2 - 4.0 cm)       Systolic MVYRNE:03.5 ml  OKHW:6.9 cm(0.6 - 1.1 cm)        Aortic Root:2.5 cm(2.0 - 3.7 cm)  LVPWd:1.2 cm(0.6 - 1.1 cm)       LA Dimension: 3.5 cm(1.9 - 4.0 cm)  Fractional Shortenin.96 %    LA volume/Index: 67.93 ml /27m^2  Calculated LVEF (%): 74.92 %     LVOT:1.8 cm  RVDd:4 cm    Mitral:                                 Aortic    Valve Area (P1/2-Time): 2.16 cm^2       Peak Velocity: 1.84 m/s  Peak E-Wave: 0.99 m/s                   Mean Velocity: 1.24 m/s  Peak A-Wave: 0.68 m/s                   Peak Gradient: 13.54 mmHg  E/A Ratio: 1.44                         Mean Gradient: 7 mmHg  Peak Gradient: 3.88 mmHg  Mean Gradient: 2 mmHg  Deceleration Time: 310 msec             Area (continuity): 1.71 cm^2  P1/2t: 102 msec                         AV VTI: 41.7 cm    Area (continuity): 1.91 cm^2  Mean Velocity: 0.58 m/s    Tricuspid:                              Pulmonic:    Estimated RVSP: 44 mmHg                 Peak Velocity: 1.41 m/s  Peak TR Velocity: 2.71 m/s              Peak Gradient: 7.95 mmHg  Peak TR Gradient: 29.3764 mmHg  Estimated RA Pressure: 15 mmHg    Estimated PASP: 44.38 mmHg    Diastology / Tissue Doppler  Septal Wall E' velocity:0.10 m/s  Septal Wall E/E':10.2  Lateral Wall E' velocity:0.09 m/s  Lateral Wall E/E':11.3      Cardiac Catheterization:   No results found for this or any previous visit. ASSESSMENT AND PLAN     Assessment:    //Acute on chronic hypoxic hypercapnic respiratory failure  //Right lower lobe pneumonia  //COPD exacerbation  //Bilateral groundglass infiltrate possibly atypical infection versus pulmonary edema  //Obesity hypoventilation syndrome  //Obstructive sleep apnea noncompliant with CPAP/BiPAP  //Severe pulmonary hypertension group 2/3  //Acute on chronic cor pulmonale  //Chronic diastolic CHF last echo 4257  //Hypertension  //Morbid obesity. Plan:      Patient is currently on 45 L nasal cannula to maintain saturation 88% or above. I have discussed with her to continue BiPAP every night and every time she is sleeping during the daytime. Trilogy/noninvasive ventilator is in the process of arranging at home. Continue with Symbicort and Spiriva  Encourage incentive spirometry, pulmonary toilet, aspiration precautions and ambulation out of bed to chair  Currently on Bumex 2 mg twice daily would recommend monitoring of electrolytes, renal function management per primary service. Continue to monitor I/O with a goal of negative fluid balance  Antimicrobials reviewed; finished levofloxacin  Finished 5 days of prednisone  DVT prophylaxis on Lovenox 30 mg twice daily.     I had discussed with the patient the echo finding I have also discussed with her about severe pulmonary hypertension and right ventricular failure and the need for continued NIV/BiPAP at home and also compliance with oxygen discussed. Her pulmonary hypertension and cor pulmonale is group 2 mainly possibly contributed by diastolic CHF and as mentioned above need oxygen control of hypercapnia and NIV use to improve right ventricular function and will ultimately need follow-up echocardiogram after compliance with BiPAP and continued use of oxygen. Consideration of right heart catheter then if continue to have significant right heart dysfunction. Physical/occupational/speech therapy; increase activity as tolerated  Discussed with nursing staff, treatment and plan discussed. I updated the patient and son regarding the current clinical condition, provisional diagnosis and management plan. I addressed concerns and answered all questions to the best of my abilities. It was my pleasure to evaluate Jose Palafox today. We will continue to follow. I would like to thank you for allowing me to participate in the care of this patient. Please feel free to call with any further questions or concerns. Liana Ndiaye MD.   Pulmonary and Critical Care Medicine           3/28/2022, 10:19 AM    Please note that this chart was generated using voice recognition Dragon dictation software. Although every effort was made to ensure the accuracy of this automated transcription, some errors in transcription may have occurred.

## 2022-03-28 NOTE — PLAN OF CARE
Problem: Pain:  Goal: Pain level will decrease  Description: Pain level will decrease  3/27/2022 1547 by Daniella Hall RN  Outcome: Ongoing  Goal: Control of acute pain  Description: Control of acute pain  3/27/2022 1547 by Daniella Hall RN  Outcome: Ongoing  Goal: Control of chronic pain  Description: Control of chronic pain  3/28/2022 0308 by Angela Corona RN  Outcome: Ongoing  3/27/2022 1547 by Daniella Hall RN  Outcome: Ongoing     Problem: Falls - Risk of:  Goal: Will remain free from falls  Description: Will remain free from falls  3/28/2022 0308 by Angela Corona RN  Outcome: Ongoing  3/27/2022 1547 by Daniella Hall RN  Outcome: Ongoing  Goal: Absence of physical injury  Description: Absence of physical injury  3/27/2022 1547 by Daniella Hall RN  Outcome: Ongoing     Problem: OXYGENATION/RESPIRATORY FUNCTION  Goal: Patient will maintain patent airway  3/28/2022 0308 by Angela Corona RN  Outcome: Ongoing  3/27/2022 1805 by Cameron Stapleton RCP  Outcome: Ongoing  3/27/2022 1547 by Daniella Hall RN  Outcome: Ongoing  Goal: Patient will achieve/maintain normal respiratory rate/effort  Description: Respiratory rate and effort will be within normal limits for the patient  3/27/2022 1805 by Cameron Stapleton RCP  Outcome: Ongoing  3/27/2022 1547 by Daniella Hall RN  Outcome: Ongoing     Problem: Skin Integrity:  Goal: Will show no infection signs and symptoms  Description: Will show no infection signs and symptoms  3/28/2022 0308 by Angela Corona RN  Outcome: Ongoing  3/27/2022 1547 by Daniella Hall RN  Outcome: Ongoing  Goal: Absence of new skin breakdown  Description: Absence of new skin breakdown  3/27/2022 1547 by Daniella Hall RN  Outcome: Ongoing

## 2022-03-28 NOTE — PROGRESS NOTES
PATIENT REFUSES TO WEAR BIPAP     [x] Risks and benefits explained to patient   [x] Patient refuses to wear Bipap stating doesn't want it on right now, not ready for bed, wanted to get night medication first.  [x] Patient verbalizes understanding of information presented. Writer returned to room ans patient still did not want to go on Bi-Pap nor felt she needed at this time.

## 2022-03-28 NOTE — TELEPHONE ENCOUNTER
----- Message from Arthur Terry sent at 3/28/2022  7:01 AM EDT -----  Centeno Myrtle, please see message from Dr Robina Simmons and call to schedule.  Thank you.   ----- Message -----  From: Kody Acevedo MD  Sent: 3/27/2022   3:28 PM EDT  To: [facility] Respiratory Spec Clinical Staff    Please schedule in clinic with Dr. Marisabel Acevedo in 3 weeks for follow-up from [facility]

## 2022-03-29 NOTE — PROGRESS NOTES
Occupational [address]  Occupational Therapy Not Seen Note    DATE: 3/29/2022    NAME: Franco Rios  MRN: [id number]   : 1975      Patient not seen this date for Occupational Therapy due to: Pt declined to get up for OT this date.     Next Scheduled Treatment: 3/30/22    Electronically signed by SHLOMO Obrien on 3/29/2022 at 2:19 PM

## 2022-03-29 NOTE — PROGRESS NOTES
[facility]  Internal Medicine Teaching Residency Program  Inpatient Daily Progress Note  ______________________________________________________________________________    Patient: Jose Palafox  YOB: 1975   QNK:[id number]    Acct: [de-identified]     Room: 2008/2008-01  Admit date: 3/18/2022  Today's date: 03/29/22  Number of days in the hospital: 11    SUBJECTIVE   Admitting Diagnosis: Pneumonia  CC: Shortness of Breath  Pt examined at bedside. Chart & results reviewed. No acute episodes overnight  Patient is heme stable and afebrile  Used BiPAP overnight  Saturating well on nasal canula   AM CBC and BMP reviewed  Continue to not work with PT/OT    ROS:  Constitutional:  negative for chills, fevers, sweats  Respiratory:  negative for cough, dyspnea on exertion, hemoptysis, shortness of breath, wheezing  Cardiovascular:  negative for chest pain, chest pressure/discomfort, lower extremity edema, palpitations  Gastrointestinal:  negative for abdominal pain, constipation, diarrhea, nausea, vomiting  Neurological:  negative for dizziness, headache    BRIEF HISTORY     The patient is a pleasant 46 y.o. female presents with a chief complaint of shortness of breath.  Past medical history significant for CHF, HIEN, COPD on 3-4 L of oxygen, asthma, hyperlipidemia, hypertension.  Patient states that she usually uses 3 to 4 L of oxygen.  Patient was brought in through the EMS when she became significantly short of breath this morning and her saturation dropped to low 70s and high 60s. .  According to the EMS, patient's oxygen tubing was kinked and she was saturating at 70%.  This improved with new tubing and patient's oxygen saturation improved to 90%.  Patient states that EMS placed her on a nonrebreather mask.     Patient states that she experiences chest pain occasionally- the pain is substernal, is at rest and sometimes pain decreases with changing position.  Patient states that the pain is sudden in onset describes the character as a pressure-like sensation, it radiates to the back. OBJECTIVE     Vital Signs:  /82   Pulse 80   Temp 98.4 °F (36.9 °C) (Oral)   Resp 15   Ht 5' 6\" (1.676 m)   Wt (!) 355 lb (161 kg)   SpO2 92%   BMI 57.30 kg/m²     Temp (24hrs), Av.8 °F (36.6 °C), Min:97.5 °F (36.4 °C), Max:98.4 °F (36.9 °C)    In: 1255   Out: 3300 [Urine:3300]    Physical Exam:  Vitals and nursing note reviewed. Constitutional:       Appearance: She is obese. HENT:      Head: Normocephalic and atraumatic. Nose: Nose normal.      Mouth/Throat:      Mouth: Mucous membranes are moist.      Pharynx: Oropharynx is clear. Eyes:      Extraocular Movements: Extraocular movements intact. Conjunctiva/sclera: Conjunctivae normal.      Pupils: Pupils are equal, round, and reactive to light. Cardiovascular:      Rate and Rhythm: Normal rate and regular rhythm. Pulses: Normal pulses. Heart sounds: Normal heart sounds. No murmur heard. No friction rub. No gallop. Pulmonary:      Effort: Pulmonary effort is normal. No respiratory distress. Breath sounds: Normal breath sounds. No stridor. No wheezing, rhonchi or rales. Chest:      Chest wall: No tenderness. Abdominal:      General: Abdomen is flat. Bowel sounds are normal. There is no distension. Palpations: Abdomen is soft. There is no mass. Tenderness: There is no abdominal tenderness. There is no guarding or rebound. Hernia: No hernia is present. Musculoskeletal:         General: No swelling, tenderness, deformity or signs of injury. Normal range of motion. Cervical back: Normal range of motion. Right lower leg: No edema. Left lower leg: No edema. Skin:     General: Skin is warm. Neurological:      General: No focal deficit present. Mental Status: She is alert and oriented to person, place, and time. Mental status is at baseline. Psychiatric:         Mood and Affect: Mood normal.         Behavior: Behavior normal.         Thought Content:  Thought content normal.         Judgment: Judgment normal.      Medications:  Scheduled Medications:    bumetanide  2 mg Oral BID    insulin lispro  0-6 Units SubCUTAneous TID WC    insulin lispro  0-3 Units SubCUTAneous Nightly    docusate sodium  100 mg Oral Daily    hydrALAZINE  25 mg Oral 3 times per day    sodium chloride flush  5-40 mL IntraVENous 2 times per day    enoxaparin  30 mg SubCUTAneous BID    amLODIPine  5 mg Oral Daily    atorvastatin  40 mg Oral Nightly    budesonide-formoterol  2 puff Inhalation BID    fluticasone  1 spray Nasal Daily    folic acid  1 mg Oral Daily    gabapentin  300 mg Oral TID    isosorbide dinitrate  20 mg Oral TID    pantoprazole  40 mg Oral QAM AC    sertraline  100 mg Oral Daily    spironolactone  25 mg Oral Daily    tiotropium  2 puff Inhalation Daily    guaiFENesin  600 mg Oral BID     Continuous Infusions:    dextrose      sodium chloride 25 mL (03/20/22 0840)     PRN Medicationsalbuterol sulfate HFA, 2 puff, Q6H PRN  LORazepam, 1 mg, Q4H PRN  glucose, 15 g, PRN  dextrose, 12.5 g, PRN  glucagon (rDNA), 1 mg, PRN  dextrose, 100 mL/hr, PRN  sodium chloride flush, 5-40 mL, PRN  sodium chloride, 25 mL, PRN  ondansetron, 4 mg, Q8H PRN   Or  ondansetron, 4 mg, Q6H PRN  polyethylene glycol, 17 g, Daily PRN  acetaminophen, 650 mg, Q6H PRN   Or  acetaminophen, 650 mg, Q6H PRN  oxyCODONE-acetaminophen, 2 tablet, Q6H PRN  sodium chloride, 1 spray, PRN      Diagnostic Labs:  CBC:   Recent Labs     03/27/22  0559 03/28/22  0415 03/29/22  0531   WBC 11.5* 13.6* 12.2*   RBC 3.82* 4.05 3.99   HGB 9.7* 10.4* 10.3*   HCT 33.7* 35.8* 34.4*   MCV 88.2 88.4 86.2   RDW 15.6* 15.7* 15.3*    271 276     BMP:   Recent Labs     03/27/22  0559 03/28/22  0501 03/29/22  0531    136 141   K 3.5* 3.9 3.9   CL 91* 91* 92*   CO2 36* 30 36*   BUN 30* 36* 41* CREATININE 0.88 0.97* 0.97*     BNP: No results for input(s): BNP in the last 72 hours. PT/INR: No results for input(s): PROTIME, INR in the last 72 hours. APTT: No results for input(s): APTT in the last 72 hours. CARDIAC ENZYMES: No results for input(s): CKMB, CKMBINDEX, TROPONINI in the last 72 hours. Invalid input(s): CKTOTAL;3  FASTING LIPID PANEL:  Lab Results   Component Value Date    CHOL 144 11/17/2018    HDL 42 10/07/2020    TRIG 68 11/17/2018     LIVER PROFILE: No results for input(s): AST, ALT, ALB, BILIDIR, BILITOT, ALKPHOS in the last 72 hours. MICROBIOLOGY:   Lab Results   Component Value Date/Time    CULTURE NO GROWTH 6 DAYS 06/29/2021 07:03 PM     ASSESSMENT & PLAN     ASSESSMENT / PLAN:     Community acquired pneumonia - Levaquin course completed 3/26    Acute on chronic CHF; HFpEF EF 75% 02/2019; resumed home isordil 20 mg tid and aldactone 25 mg qd; Transition to PO Bumex 2mg BID. Echo result indicative of EF of 60% with severe pulmonary hypertension    COPD exacerbation - pulmonology on board; continue albuterol, symbicort, spiriva. Started on predisone 20 mg for 5 days completed. Pulmonology following. Recommendations appreciated. Trilogy vent pending. Acute on chronic hypoxic respiratory failure secondary to #2 and #3    HTN - Norvasc 5 mg qd, hydralazine 25 mg q8h    HLD - Lipitor 40 mg qd    Type 2 DM HbA1C 6.4 03/22- Sugars well controlled. Neither Lantus nor Sliding scale Insulin needed currently. Hypoglycemia protocol. MDD - Zoloft    Morbid obesity - Counseled      DVT ppx: Lovenox  GI ppx: Protonix     PT/OT/SW: On board  Discharge Planning: Medically discharged. [employer] approval pending.  efforts appreciated. Chrystal Barillas MD  Internal Medicine Resident, PGY-2  [facility];  [place], [place]  3/29/2022, 3:53 PM      I have discussed the care of [address] [place] [address] , including pertinent history and exam findings,    today with the resident. I have seen and examined the patient and the key elements of all parts of the encounter have been performed by me . I agree with the assessment, plan and orders as documented by the resident. Principal Problem:    Pneumonia  Active Problems:    HTN (hypertension)    COPD exacerbation (HCC)    Pulmonary hypertension, moderate to severe (HCC)    Morbid obesity with BMI of 50.0-59.9, adult (HCC)    STEPH (obstructive sleep apnea)    Normocytic anemia    Dyspnea    Prediabetes    Hyperlipidemia    Hypokalemia    Hypomagnesemia  Resolved Problems:    * No resolved hospital problems. *        Overall  course ;                                   are improving over time.         Patient clinically doing better  DC planning  Discussed with discharge planner          Electronically signed by Gisela Wheeler MD

## 2022-03-29 NOTE — PLAN OF CARE
Problem: OXYGENATION/RESPIRATORY FUNCTION  Goal: Patient will maintain patent airway  3/28/2022 2332 by Keegan De Los Santos RN  Outcome: Ongoing  3/28/2022 1118 by Carlene Reed RN  Outcome: Ongoing     Problem: OXYGENATION/RESPIRATORY FUNCTION  Goal: Patient will achieve/maintain normal respiratory rate/effort  Description: Respiratory rate and effort will be within normal limits for the patient  3/28/2022 2332 by Keegan De Los Santos RN  Outcome: Ongoing  3/28/2022 1118 by Carlene Reed RN  Outcome: Ongoing

## 2022-03-29 NOTE — CARE COORDINATION
Spoke to Dr Janusz De Paz. He is requiring that pt has noninvasive ventilator or cpap or bipap at home before she can discharge. Met with pt. She will have her son look for her cpap at home and bring it in tomorrow. Spoke to Fausto at [facility] [place]. They are unable to get a bipap as they are on backorder.  [facility] attempted to call insurance company and was unable to speak to anyone

## 2022-03-29 NOTE — ADT AUTH CERT
Subscriber Details  Hospital Account [de-identified]  [employer] Subscriber Name/Sex/Relation Subscriber  Subscriber Address/Phone Subscriber Emp/Emp Phone   1. [employer] MEDIC[employer]   [id number] [facility] - Female   (Self) [address] [facility] [place]  [address]   [phone](U) UNEMPLOYED    2. [address]   [id number] [facility] - Female   (Self) 1975 [address] [place]  [address]   [phone](E) UNEMPLOYED        Utilization Reviews         Pneumonia - Care Day 11 (3/28/2022) by Adalberto Bauer RN       Review Entered Review Status   3/29/2022 15:56 Completed      Criteria Review      Care Day: 11 Care Date: 3/28/2022 Level of Care:    Guideline Day 2    Level Of Care    (X) Floor    Clinical Status    ( ) * No CO2 retention or acidosis    ( ) * No requirement for mechanical ventilation    (X) * Hypotension absent    3/29/2022 3:56 PM EDT by Fly Gabriel      BP STABLE    (X) * Afebrile or fever improved    3/29/2022 3:56 PM EDT by Fly Gabriel      NO FEVER    ( ) * No hypoxia on room air or oxygenation improved    ( ) * Mental status improved or at baseline    Activity    ( ) * Increased activity    Routes    (X) Oral hydration    (X) Oral medications    (X) Usual diet    Interventions    (X) Pulse oximetry    3/29/2022 3:56 PM EDT by Fly Gabriel      CONTINUOUS    (X) Possible oxygen    3/29/2022 3:56 PM EDT by Fly Gabriel      5 L NC    * Milestone   Additional Notes   DATE: 3/28/22      Pertinent Updates:   Overnight events noted chart reviewed medications seen. On 45 L nasal cannula maintaining saturation 90% or above sometimes she dipped to 88% at rest.   She did use BiPAP: Patient most of the night is on 14/50 percent and able to tolerate BiPAP overnight without much problem. According to patient to chair yesterday with help of physical therapy she is using bedside commode she has mild cough and no wheezing.    She is on Bumex 2 mg oral twice daily and urine output reported to be 2500 mL in last 24 hours. Labs shows creatinine of 0.97 BUN is 36 bicarbonate 30 WBC 13.6 hemoglobin 10. 4.       Chest x-ray 03/22/2022 shows better aeration of right lower lung field right lower lobe infiltrate/atelectasis improved cardiomegaly present   Echocardiogram on 03/22/2022 shows severe pulmonary hypertension with estimated RVSP 80 with right ventricular dilatation and volume overload and moderate to severe TR          Vitals:      /71   Pulse 86   Temp 98.3 °F (36.8 °C) (Oral)   Resp 21   Ht 5' 6\" (1.676 m)   Wt (!) 355 lb (161 kg)   SpO2 90% 5 L NC  BMI 57.30 kg/m²       Abnl/Pertinent Labs/Radiology/Diagnostic Studies:      3/29/2022 05:31   Chloride: 92 (L)   CO2: 36 (H)   BUN,BUNPL: 41 (H)   Creatinine: 0.97 (H)   GLUCOSE, FASTING,GF: 208 (H)   WBC: 12.2 (H)   Hemoglobin Quant: 10.3 (L)   Hematocrit: 34.4 (L)   RDW: 15.3 (H)   Seg Neutrophils: 73 (H)   Segs Absolute: 8.93 (H)   Lymphocytes: 17 (L)   Immature Granulocytes: 1 (H)            Physical Exam:   General appearance - looks comfortable and in mildly tachypneic and not in acute distress   Mental status - alert, oriented to person, place, and time   Eyes - pupils equal and reactive, extraocular eye movements intact   Mouth - mucous membranes moist, pharynx normal without lesions, Mallampati 2   Neck -Short thick neck supple, no significant adenopathy   Chest - Chest was symmetrical without dullness to percussion.  Air entry is severely reduced and distant bilaterally with no rhonchi, no expiratory wheezing no crackles present.  There is no intercostal recession or use of accessory muscles   Heart - normal rate, regular rhythm, normal S1, S2, no murmurs, rubs, clicks or gallops   Abdomen - soft, nontender, nondistended, no masses or organomegaly   Neurological - alert, oriented, normal speech, no focal findings or movement disorder noted   Extremities - peripheral pulses normal, positive pedal edema, no clubbing or cyanosis   Skin - normal coloration and turgor, no rashes, no suspicious skin lesions noted           MD Consults/Assessments & Plans:   Patient is currently on 45 L nasal cannula to maintain saturation 88% or above. I have discussed with her to continue BiPAP every night and every time she is sleeping during the daytime. Trilogy/noninvasive ventilator is in the process of arranging at home. Continue with Symbicort and Spiriva   Encourage incentive spirometry, pulmonary toilet, aspiration precautions and ambulation out of bed to chair   Currently on Bumex 2 mg twice daily would recommend monitoring of electrolytes, renal function management per primary service. Continue to monitor I/O with a goal of negative fluid balance   Antimicrobials reviewed; finished levofloxacin   Finished 5 days of prednisone   DVT prophylaxis on Lovenox 30 mg twice daily.       I had discussed with the patient the echo finding I have also discussed with her about severe pulmonary hypertension and right ventricular failure and the need for continued NIV/BiPAP at home and also compliance with oxygen discussed. Her pulmonary hypertension and cor pulmonale is group 2 mainly possibly contributed by diastolic CHF and as mentioned above need oxygen control of hypercapnia and NIV use to improve right ventricular function and will ultimately need follow-up echocardiogram after compliance with BiPAP and continued use of oxygen.  Consideration of right heart catheter then if continue to have significant right heart dysfunction.       Medications:   Scheduled Meds:   · bumetanide 2 mg Oral BID   · insulin lispro 0-6 Units SubCUTAneous TID WC   · insulin lispro 0-3 Units SubCUTAneous Nightly   · docusate sodium 100 mg Oral Daily   · hydrALAZINE 25 mg Oral 3 times per day   · sodium chloride flush 5-40 mL IntraVENous 2 times per day   · enoxaparin 30 mg SubCUTAneous BID   · amLODIPine 5 mg Oral Daily   · atorvastatin 40 mg Oral Nightly   · budesonide-formoterol 2 puff Inhalation BID   · fluticasone 1 spray Nasal Daily   · folic acid 1 mg Oral Daily   · gabapentin 300 mg Oral TID   · isosorbide dinitrate 20 mg Oral TID   · pantoprazole 40 mg Oral QAM AC   · sertraline 100 mg Oral Daily   · spironolactone 25 mg Oral Daily   · tiotropium 2 puff Inhalation Daily   · guaiFENesin 600 mg Oral BID      PREDNISONE 20 MG PO X1      PRN   ATIVAN 1 MG IV X1   PERCOCET 2 TABS PO X2            PT/OT/SLP/CM Assessments or Notes:   PER CM   Dr Harjit Wallace pt will need noninvasive ventilation arranged for home before pt can be discharged. Leidy Gale at [facility] notified. They are able to deliver higher concentrator when pt is ready for discharge              Pneumonia - Care Day 9 (3/26/2022) by Vilma Martinez RN       Review Entered Review Status   3/29/2022 15:48 Completed      Criteria Review      Care Day: 9 Care Date: 3/26/2022 Level of Care:    Guideline Day 2    Level Of Care    (X) Floor    Clinical Status    ( ) * No CO2 retention or acidosis    ( ) * No requirement for mechanical ventilation    (X) * Hypotension absent    3/29/2022 3:48 PM EDT by Ed Quiñones      BP STABLE    (X) * Afebrile or fever improved    3/29/2022 3:48 PM EDT by Ed Quiñones      AFEBRILE    ( ) * No hypoxia on room air or oxygenation improved    ( ) * Mental status improved or at baseline    Activity    ( ) * Increased activity    Routes    (X) Oral hydration    (X) Oral medications    (X) Usual diet    Interventions    (X) Pulse oximetry    3/29/2022 3:48 PM EDT by Ed Quiñones      CONTINUOUS    (X) Possible oxygen    3/29/2022 3:48 PM EDT by Ed Quiñones      HIGHFLOW 45% FIO2    * Milestone   Additional Notes   DATE: 3/26/22      Pertinent Updates:   - Pt examined at bedside. Chart & results reviewed.     - No acute events overnight   - Patient resting comfortably in bed on BiPAP this morning   - Patient's urine light red in color   - Patient denies any urinary symptoms or abdominal pain   - High flow nasal cannula 25 L and 45% FiO2   - Afebrile   - Vital signs stable      Vitals:      /80   Pulse 79   Temp 98.6 °F (37 °C) (Axillary)   Resp 15   Ht 5' 6\" (1.676 m)   Wt (!) 355 lb (161 kg)   SpO2 94%   BMI 57.30 kg/m²      Abnl/Pertinent Labs/Radiology/Diagnostic Studies:      3/26/2022 02:59   Sodium: 133 (L)   Chloride: 88 (L)   CO2: 32 (H)   BUN,BUNPL: 33 (H)   Creatinine: 0.91 (H)   GLUCOSE, FASTING,GF: 225 (H)   Hemoglobin Quant: 10.1 (L)   Hematocrit: 35.1 (L)   RDW: 15.5 (H)   Eosinophils %: 0 (L)   Seg Neutrophils: 81 (H)   Lymphocytes: 10 (L)   Absolute Lymph #: 0.95 (L)   Immature Granulocytes: 2 (H)            Physical Exam:   Constitutional:        Appearance: Normal appearance. HENT:       Head: Normocephalic and atraumatic.       Nose: Nose normal.       Mouth/Throat:       Mouth: Mucous membranes are moist.       Pharynx: Oropharynx is clear. Eyes:       Extraocular Movements: Extraocular movements intact.       Conjunctiva/sclera: Conjunctivae normal.       Pupils: Pupils are equal, round, and reactive to light. Cardiovascular:       Rate and Rhythm: Normal rate and regular rhythm.       Pulses: Normal pulses.       Heart sounds: Normal heart sounds. No murmur heard. No friction rub. No gallop.     Pulmonary:       Effort: Pulmonary effort is normal. No respiratory distress.       Breath sounds: No stridor. No wheezing, rhonchi or rales. Chest:       Chest wall: No tenderness. Abdominal:       General: Abdomen is flat. Bowel sounds are normal. There is no distension.       Palpations: Abdomen is soft. There is no mass.       Tenderness: There is no abdominal tenderness. There is no guarding or rebound.       Hernia: No hernia is present. Musculoskeletal:          General: No swelling, tenderness, deformity or signs of injury. Normal range of motion.       Cervical back: Normal range of motion.       Right lower leg: No edema.    Left lower leg: No edema. Skin:      General: Skin is warm. Neurological:       General: No focal deficit present.       Mental Status: She is alert and oriented to person, place, and time. Mental status is at baseline. Psychiatric:          Mood and Affect: Mood normal.          Behavior: Behavior normal.          Thought Content: Thought content normal.          Judgment: Judgment normal.               MD Consults/Assessments & Plans:   Assessment and Plan:       Principal Problem:     Pneumonia   Active Problems:     HTN (hypertension)     COPD exacerbation ([facility])     Pulmonary hypertension, moderate to severe ([address]     Morbid obesity with BMI of 50.0-59.9, adult (HCC)     STEPH (obstructive sleep apnea)     Normocytic anemia     Dyspnea     Prediabetes     Hyperlipidemia     Hypokalemia     Hypomagnesemia   Resolved Problems:     * No resolved hospital problems. *           Community acquired pneumonia - Levaquin course completed 3/26   Acute on chronic CHF; HFpEF EF 75% 02/2019; resumed home isordil 20 mg tid and aldactone 25 mg qd; Transition to PO Bumex 2mg BID. Echo result indicative of EF of 60% with severe pulmonary hypertension   COPD exacerbation - pulmonology on board; continue albuterol, symbicort, spiriva. Started on predisone 40 mg for 5 days completed. Pulmonology following. Recommendations appreciated. Trilogy vent recommended for discharge.  Continue to wean oxygen as tolerated. Goal to get patient back on 4 to 5 L via nasal cannula. Acute on chronic hypoxic respiratory failure secondary to #2 and #3   HTN - Norvasc 5 mg qd, hydralazine 25 mg q8h. Will consider optimization of meds with addition of Lisinopril due to DM    HLD - Lipitor 40 mg qd   Type 2 DM HbA1C 6.4 03/22- Sugars well controlled. Neither Lantus nor Sliding scale Insulin needed currently. Hypoglycemia protocol.    MDD - Zoloft   Morbid obesity - Counseled        DVT ppx: Lovenox   GI ppx: Protonix       PT/OT/SW: On board   Discharge Planning: Ongoing pending ability to wean from high flow oxygen as appropriate.   efforts appreciated.            Medications:   Scheduled Meds:   · bumetanide 2 mg Oral BID   · insulin lispro 0-6 Units SubCUTAneous TID WC   · insulin lispro 0-3 Units SubCUTAneous Nightly   · docusate sodium 100 mg Oral Daily   · hydrALAZINE 25 mg Oral 3 times per day   · sodium chloride flush 5-40 mL IntraVENous 2 times per day   · enoxaparin 30 mg SubCUTAneous BID   · amLODIPine 5 mg Oral Daily   · atorvastatin 40 mg Oral Nightly   · budesonide-formoterol 2 puff Inhalation BID   · fluticasone 1 spray Nasal Daily   · folic acid 1 mg Oral Daily   · gabapentin 300 mg Oral TID   · isosorbide dinitrate 20 mg Oral TID   · pantoprazole 40 mg Oral QAM AC   · sertraline 100 mg Oral Daily   · spironolactone 25 mg Oral Daily   · tiotropium 2 puff Inhalation Daily   · guaiFENesin 600 mg Oral BID      BUMEX 2 MG IV X1   PREDNISONE 20 MG PO X1      PRN   ATIVAN 1 MG IV X3   PERCOCET 2 TABS PO X3      Orders:   Plan for today:   - Urinalysis with reflex culture   - Encourage patient to work with PT/OT   - Goal SPO2 88 to 92%   - Continue to wean oxygen as tolerated   - Transition to PO Bumex BID

## 2022-03-29 NOTE — PROGRESS NOTES
PULMONARY & CRITICAL CARE MEDICINE PROGRESS  NOTE     Patient:  Damián Bowman  MRN: [id number]  Admit date: 3/18/2022  Primary Care Physician: Dana Medina DO  Consulting Physician: Joelle Bowling MD  CODE Status: Full Code  LOS: 11    SUBJECTIVE     I personally interviewed/examined the patient, reviewed interval history and interpreted all available radiographic, laboratory data at the time of service. Chief Compliant/Reason for Initial Consult:   Acute on chronic hypoxic hypercapnic hypercapnic respiratory failure  COPD/CHF exacerbation  Pneumonia/pulmonary edema    Brief Hospital Course: The patient is a 55 y.o. female presents with complaint of shortness of breath, associated with stuffy nose, productive cough, clear sputum. Patient desatted to high 60s and low 70s. According to EMS, patient's oxygen tubing was kinked and was saturating at 70%. Improved with new tubing. She was placed on nonrebreather. Patient also complained of occasional substernal chest pain pressure-like sensation radiating to back at the time of presentation. On presentation, patient afebrile hemodynamically stable saturating at 96% on high flow 90% FiO2 30 L/minute   Pertinent labs: proBNP 1086  WBC 11.5  Procalcitonin 0.25  Negative for COVID-19 testing  VBG: pH 7.26/PCO2 85/PO2 40.6/bicarb 37. Acute on chronic hypercapnic respiratory acidosis with appropriate metabolic compensation  Chest x-ray: Patchy airspace opacity right worse than left. CT PE: Negative for PE. Groundglass opacity present. Reactive adenopathy throughout mediastinum and hilar region. Patchy consolidation right lower lobe. Interval History:  03/29/22     Patient seen and examined in her room  Saturating well on 5 L oxygen via nasal cannula  She used BiPAP overnight, 14/8/50 percent  Has some dry cough but no shortness of breath at rest or wheezing.   On Bumex 2 mg twice daily and Aldactone 25 mg once daily, urine output: 2.6 L / 24 hours  Labs: Creatinine 0.97  WBC 13.6-12.2  Bicarb 36  Hemoglobin 10.3    Chest x-ray 2022 shows better aeration of right lower lung field right lower lobe infiltrate/atelectasis improved cardiomegaly present  Echocardiogram on 2022 shows severe pulmonary hypertension with estimated RVSP 80 with right ventricular dilatation and volume overload and moderate to severe TR    Review of Systems:  Review of Systems   Constitutional: Negative for fatigue and fever. HENT: Negative for postnasal drip, rhinorrhea, sore throat, trouble swallowing and voice change. Eyes: Negative for photophobia and redness. Respiratory: Positive for cough and shortness of breath. Negative for wheezing. Cardiovascular: Positive for leg swelling. Negative for chest pain. Gastrointestinal: Negative for abdominal pain, diarrhea and vomiting. Endocrine: Negative for polydipsia, polyphagia and polyuria. Genitourinary: Negative for difficulty urinating, dysuria, frequency and hematuria. Musculoskeletal: Negative. Allergic/Immunologic: Negative. Neurological: Negative for dizziness, syncope, speech difficulty and headaches. Hematological: Negative for adenopathy. Does not bruise/bleed easily. Psychiatric/Behavioral: Negative.         OBJECTIVE     VITAL SIGNS:   LAST-  /82   Pulse 80   Temp 98.4 °F (36.9 °C) (Oral)   Resp 15   Ht 5' 6\" (1.676 m)   Wt (!) 355 lb (161 kg)   SpO2 92%   BMI 57.30 kg/m²   8-24 HR RANGE-  TEMP Temp  Av.8 °F (36.6 °C)  Min: 97.5 °F (36.4 °C)  Max: 98.4 °F (59.5 °C)   BP Systolic (44NJW), DARRIUS:901 , Min:116 , JEC:503      Diastolic (93KYW), NVU:88, Min:67, Max:85     PULSE Pulse  Av.8  Min: 74  Max: 92   RR Resp  Avg: 15.5  Min: 14  Max: 17   O2 SAT SpO2  Av.5 %  Min: 90 %  Max: 96 %   OXYGEN DELIVERY O2 Flow Rate (L/min)  Av L/min  Min: 5 L/min  Max: 5 L/min     Systemic Examination:   Physical Exam  General appearance - looks comfortable and in mildly tachypneic and not in acute distress  Mental status - alert, oriented to person, place, and time  Eyes - pupils equal and reactive, extraocular eye movements intact  Mouth - mucous membranes moist, pharynx normal without lesions, Mallampati 2  Neck -Short thick neck supple, no significant adenopathy  Chest -bilateral decreased breath sounds. No rhonchi/wheezing/crackles. No usage of accessory muscles of respiration. Heart - normal rate, regular rhythm, normal S1, S2, no murmurs, rubs, clicks or gallops  Abdomen - soft, nontender, nondistended, no masses or organomegaly  Neurological - alert, oriented, normal speech, no focal findings or movement disorder noted  Extremities - peripheral pulses normal, positive pedal edema, no clubbing or cyanosis  Skin - normal coloration and turgor, no rashes, no suspicious skin lesions noted     DATA REVIEW     Medications:  Scheduled Meds:   bumetanide  2 mg Oral BID    insulin lispro  0-6 Units SubCUTAneous TID WC    insulin lispro  0-3 Units SubCUTAneous Nightly    docusate sodium  100 mg Oral Daily    hydrALAZINE  25 mg Oral 3 times per day    sodium chloride flush  5-40 mL IntraVENous 2 times per day    enoxaparin  30 mg SubCUTAneous BID    amLODIPine  5 mg Oral Daily    atorvastatin  40 mg Oral Nightly    budesonide-formoterol  2 puff Inhalation BID    fluticasone  1 spray Nasal Daily    folic acid  1 mg Oral Daily    gabapentin  300 mg Oral TID    isosorbide dinitrate  20 mg Oral TID    pantoprazole  40 mg Oral QAM AC    sertraline  100 mg Oral Daily    spironolactone  25 mg Oral Daily    tiotropium  2 puff Inhalation Daily    guaiFENesin  600 mg Oral BID     Continuous Infusions:   dextrose      sodium chloride 25 mL (03/20/22 0840)     LABS:-  ABG:   No results for input(s): POCPH, POCPCO2, POCPO2, POCHCO3, LSXC5HCH in the last 72 hours.   CBC:   Recent Labs     03/27/22  0559 03/28/22  0415 03/29/22  0531   WBC 11.5* 13.6* 12.2*   HGB 9.7* 10.4* 10.3*   HCT 33.7* 35.8* 34.4*   MCV 88.2 88.4 86.2    271 276   LYMPHOPCT 21* 20* 17*   RBC 3.82* 4.05 3.99   MCH 25.4 25.7 25.8   MCHC 28.8 29.1 29.9   RDW 15.6* 15.7* 15.3*     BMP:   Recent Labs     03/27/22  0559 03/28/22  0501 03/29/22  0531    136 141   K 3.5* 3.9 3.9   CL 91* 91* 92*   CO2 36* 30 36*   BUN 30* 36* 41*   CREATININE 0.88 0.97* 0.97*   GLUCOSE 186* 225* 208*     Liver Function Test:   No results for input(s): PROT, LABALBU, ALT, AST, GGT, ALKPHOS, BILITOT in the last 72 hours. Amylase/Lipase:  No results for input(s): AMYLASE, LIPASE in the last 72 hours. Coagulation Profile:   No results for input(s): INR, PROTIME, APTT in the last 72 hours. Cardiac Enzymes:  No results for input(s): CKTOTAL, CKMB, CKMBINDEX, TROPONINI in the last 72 hours. Lactic Acid:  No results found for: LACTA  BNP:   No results found for: BNP  D-Dimer:  Lab Results   Component Value Date    DDIMER 0.86 03/19/2022     Others:   Lab Results   Component Value Date    TSH 2.36 02/23/2019     Lab Results   Component Value Date    LAUREN NEGATIVE 05/07/2019    CRP 8.0 (H) 06/09/2018     No results found for: Lauren Franklin  Lab Results   Component Value Date    IRON 34 (L) 04/23/2020    TIBC 235 (L) 04/23/2020    FERRITIN 275 (H) 04/23/2020     No results found for: SPEP, UPEP  No results found for: PSA, CEA, , CN2131,     Input/Output:    Intake/Output Summary (Last 24 hours) at 3/29/2022 1540  Last data filed at 3/29/2022 1530  Gross per 24 hour   Intake 835 ml   Output 2300 ml   Net -1465 ml       Microbiology:  No results for input(s): SPECDESC, SPECDESC, SPECIAL, CULTURE, CULTURE, STATUS, ORG, CDIFFTOXPCR, CAMPYLOBPCR, SALMONELLAPC, SHIGAPCR, SHIGELLAPCR, MPNEUG, MPNEUM, LACTOQL in the last 72 hours. Pathology:    Radiology reports:  XR CHEST PORTABLE   Final Result   Improvement in the the right basal infiltrate. FL MODIFIED BARIUM SWALLOW W VIDEO   Final Result   No penetration or aspiration with the above administered substances. Please see separate speech pathology report for full discussion of findings   and recommendations. XR CHEST PORTABLE   Final Result   Findings suggestive of worsening bibasilar pneumonia. CT CHEST PULMONARY EMBOLISM W CONTRAST   Final Result   No evidence of pulmonary embolism. There is patchy ill-defined opacification   lower lung fields bilaterally suggesting infiltrate with ground-glass opacity   concerning for pneumonia as well in the upper lung fields. Reactive   adenopathy throughout the mediastinum and hilar regions. RECOMMENDATIONS:   Unavailable         VL DUP LOWER EXTREMITY VENOUS BILATERAL   Final Result      XR CHEST PORTABLE   Final Result   Stable cardiomegaly with mild pulmonary vascular congestion.              Echocardiogram:   Results for orders placed during the hospital encounter of 02/22/19    ECHO Complete 2D W Doppler W Color    Narrative  Transthoracic Echocardiography Report (TTE)    Patient Name PRICE       Date of Study               02/25/2019  Gleda Azalia    Date of      1975  Gender                      Female  Birth    Age          37 year(s)  Race                        Black    Room Number  3001        Height:                     60 inch, 152.4 cm    Corporate ID [id number]  Weight:                     412 pounds, 186.9 kg  #    Patient Acct [de-identified]   BSA:          2.54 m^2      BMI:      80.46  #                                                              kg/m^2    MR #         [id number]     [facility]    Accession #  [id number]   Interpreting Physician      [facility]    Fellow                   Referring Nurse  Practitioner    Interpreting             Referring Physician         Shea Gandara    Type of Study    TTE procedure:2D Echocardiogram, M-Mode, Doppler, Color Doppler. Procedure Date  Date: 02/25/2019 Start: 07:44 AM    Study Location: [facility]  Technical Quality: Poor visualization due to body habitus. History / Tech. Comments:  Procedure explained to patient. Very technically difficult study due to body  habitus. 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    - Sulfa. CONCLUSIONS    Summary  Technically difficult study. Left ventricle is normal in size. Mild concentric LVH Global left  ventricular systolic function appears hyperdynamic Calculated ejection  fraction is 75% . Mild left ventricular hypertrophy. Moderately dilated right ventricle size and severely decreased systolic  function. Estimated right ventricular systolic pressure is 44 mmHg. Moderate pulmonary hypertension. mildly elevated RA filling pressure . Signature  ----------------------------------------------------------------------------  Electronically signed by Isiah Marie(Interpreting physician) on  02/25/2019 03:15 PM  ----------------------------------------------------------------------------    ----------------------------------------------------------------------------  Electronically signed by Bambi Ray(Sonographer) on 02/25/2019  09:28 AM  ----------------------------------------------------------------------------  FINDINGS  Left Atrium  Left atrium is normal in size. Left Ventricle  Technically difficult study. Left ventricle is normal in size. Mld  concentric LVH Global left ventricular systolic function appears  hyperdynamic Calculated ejection fraction is 75% . Mild left ventricular hypertrophy. Right Atrium  Normal right atrial dimension. Right Ventricle  Moderately dilated right ventricle size and severely decreased systolic  function. Mitral Valve  Normal mitral valve structure.   Trivial mitral Pressure: 15 mmHg    Estimated PASP: 44.38 mmHg    Diastology / Tissue Doppler  Septal Wall E' velocity:0.10 m/s  Septal Wall E/E':10.2  Lateral Wall E' velocity:0.09 m/s  Lateral Wall E/E':11.3      Cardiac Catheterization:   No results found for this or any previous visit. ASSESSMENT AND PLAN     Assessment:    //Acute on chronic hypoxic hypercapnic respiratory failure  //Right lower lobe pneumonia  //COPD exacerbation  //Bilateral groundglass infiltrate possibly atypical infection versus pulmonary edema  //Obesity hypoventilation syndrome  //Obstructive sleep apnea noncompliant with CPAP/BiPAP  //Severe pulmonary hypertension group 2/3  //Acute on chronic cor pulmonale  //Chronic diastolic CHF last echo 4741  //Hypertension  //Morbid obesity. Plan:      Patient is currently on 5 L nasal cannula to maintain saturation 88% or above. I have discussed with her to continue BiPAP every night and every time she is sleeping during the daytime. Trilogy/noninvasive ventilator is in the process of arranging at home. Per case management, trilogy vent will take 2 more weeks. Discussed with  about sending her on BiPAP if DME allows. Meanwhile, requested patient's family to bring her CPAP equipment to see if the mask matches with her equipment. Continue with Symbicort and Spiriva  Encourage incentive spirometry, pulmonary toilet, aspiration precautions and ambulation out of bed to chair  Currently on Bumex 2 mg twice daily would recommend monitoring of electrolytes, renal function management per primary service. Continue to monitor I/O with a goal of negative fluid balance  Antimicrobials reviewed; finished levofloxacin  Finished 5 days of prednisone  DVT prophylaxis on Lovenox 30 mg twice daily.     I had discussed with the patient the echo finding I have also discussed with her about severe pulmonary hypertension and right ventricular failure and the need for continued NIV/BiPAP at home and also compliance with oxygen discussed. Her pulmonary hypertension and cor pulmonale is group 2 mainly possibly contributed by diastolic CHF and as mentioned above need oxygen control of hypercapnia and NIV use to improve right ventricular function and will ultimately need follow-up echocardiogram after compliance with BiPAP and continued use of oxygen. Consideration of right heart catheter then if continue to have significant right heart dysfunction. Physical/occupational/speech therapy; increase activity as tolerated  Discussed with nursing staff, treatment and plan discussed. I updated the patient and son regarding the current clinical condition, provisional diagnosis and management plan. I addressed concerns and answered all questions to the best of my abilities. It was my pleasure to evaluate Ama Byres today. We will continue to follow. I would like to thank you for allowing me to participate in the care of this patient. Please feel free to call with any further questions or concerns. Td Hurt MD  PGY-3, Internal Medicine Resident  [place], [place]  3/29/2022 3:41 PM    Please note that this chart was generated using voice recognition Dragon dictation software. Although every effort was made to ensure the accuracy of this automated transcription, some errors in transcription may have occurred. Attending Physician Statement  I have discussed the care of Ama Byers, including pertinent history and exam findings with the resident. I have reviewed the key elements of all parts of the encounter with the resident. I have seen and examined the patient with the resident. I agree with the assessment and plan and status of the problem list as documented. I seen the patient during the round today, chart reviewed, labs and medications reviewed overnight events noted.   Patient remained on nasal cannula 5 L did not require high flow nasal cannula for about 48 hours now or more. She did use BiPAP at night BiPAP setting 14/8/50 percent at night and patient is tolerating and compliant with BiPAP. She had not been ambulated out of bed to chair today. She is continued on Bumex 2 mg twice daily and urine output was reported to be 2600 mL in last 24 hours. Continue Symbicort and Spiriva. Encourage incentive spirometry ambulation physical therapy. Patient is waiting for Trilogy/NIV to be arranged at home. Discussed with the . Patient will need BiPAP/CPAP to be used at home in the meantime which can be provided at home if possible as I was told that it might take up to 2 weeks before she get home NIV if BiPAP cannot be arranged then patient can use her home CPAP according to patient she does have a CPAP at home which she does not have any mask. New problem to provide prescription for mask if it can be arranged for home use and she can use home CPAP in the meantime before home NIV is available.  to discuss with the patient. Please note that this chart was generated using voice recognition Dragon dictation software. Although every effort was made to ensure the accuracy of this automated transcription, some errors in transcription may have occurred.      Selene Kiser MD  3/29/2022 3:59 PM

## 2022-03-29 NOTE — PLAN OF CARE
Problem: OXYGENATION/RESPIRATORY FUNCTION  Goal: Patient will maintain patent airway  3/29/2022 0810 by Jacqualine Councilman, RCP  Outcome: Ongoing     Problem: OXYGENATION/RESPIRATORY FUNCTION  Goal: Patient will achieve/maintain normal respiratory rate/effort  Description: Respiratory rate and effort will be within normal limits for the patient  3/29/2022 0810 by Jacqualine Councilman, RCP  Outcome: Ongoing     BRONCHOSPASM/BRONCHOCONSTRICTION     [x]         IMPROVE AERATION/BREATH SOUNDS  [x]   ADMINISTER BRONCHODILATOR THERAPY AS APPROPRIATE  [x]   ASSESS BREATH SOUNDS  []   IMPLEMENT AEROSOL/MDI PROTOCOL  [x]   PATIENT EDUCATION AS NEEDED

## 2022-03-29 NOTE — PLAN OF CARE
Problem: Pain:  Goal: Pain level will decrease  Description: Pain level will decrease  3/28/2022 2332 by Sophia Tim RN  Outcome: Ongoing  3/28/2022 1118 by Jose Antonio Bills RN  Outcome: Ongoing  Goal: Control of acute pain  Description: Control of acute pain  3/28/2022 2332 by Sophia Tim RN  Outcome: Ongoing  3/28/2022 1118 by Jose Antonio Bills RN  Outcome: Ongoing  Goal: Control of chronic pain  Description: Control of chronic pain  3/28/2022 2332 by Sophia Tim RN  Outcome: Ongoing  3/28/2022 1118 by Jose Antonio Bills RN  Outcome: Ongoing     Problem: Falls - Risk of:  Goal: Will remain free from falls  Description: Will remain free from falls  3/28/2022 2332 by Sophia Tim RN  Outcome: Ongoing  3/28/2022 1118 by Jose Antonio Bills RN  Outcome: Ongoing  Goal: Absence of physical injury  Description: Absence of physical injury  3/28/2022 2332 by Sophia Tim RN  Outcome: Ongoing  3/28/2022 1118 by Jose Antonio Bills RN  Outcome: Ongoing     Problem: OXYGENATION/RESPIRATORY FUNCTION  Goal: Patient will maintain patent airway  3/28/2022 2332 by Sophia Tim RN  Outcome: Ongoing  3/28/2022 1118 by Jose Antonio Bills RN  Outcome: Ongoing  Goal: Patient will achieve/maintain normal respiratory rate/effort  Description: Respiratory rate and effort will be within normal limits for the patient  3/28/2022 2332 by Sophia Tim RN  Outcome: Ongoing  3/28/2022 1118 by Jose Antonio Bills RN  Outcome: Ongoing     Problem: Skin Integrity:  Goal: Will show no infection signs and symptoms  Description: Will show no infection signs and symptoms  3/28/2022 2332 by Sophia Tim RN  Outcome: Ongoing  3/28/2022 1118 by Jose Antonio Bills RN  Outcome: Ongoing  Goal: Absence of new skin breakdown  Description: Absence of new skin breakdown  3/28/2022 2332 by Sophia Tim RN  Outcome: Ongoing  3/28/2022 1118 by Jose Antonio Bills RN  Outcome: Ongoing

## 2022-03-29 NOTE — PROGRESS NOTES
Physical Therapy        Physical Therapy Cancel Note      DATE: 3/29/2022    NAME: Cassi Powell  MRN: [id number]   : 1975      Patient not seen this date for Physical Therapy due to:    Patient Declined: Pt states she tired and would like to rest at this moment.  Next schedule 3/3/22      Electronically signed by Quinton Gibson PTA on 3/29/2022 at 2:12 PM

## 2022-03-30 NOTE — PROGRESS NOTES
Attending Physician Statement  I have discussed the care of Dina Peterson, including pertinent history and exam findings with the resident. I have reviewed the key elements of all parts of the encounter with the resident. I have seen and examined the patient with the resident. I agree with the assessment and plan and status of the problem list as documented. Please see full note by pulmonary resident Dr. Brit Moreno  I seen the patient during the round today, chart reviewed, labs and medications reviewed. Overall she has been improving gradually. She remains on 5 L nasal cannula maintaining saturation most of the time 90%. She has not been out of bed to chair today. She denies much sputum production has mild cough no wheezing. She has been using NIV/BiPAP and has been tolerating 14/8 at night had not been having significant issues during the daytime. He is on Bumex with good urine output. Creatinine is 1.01 bicarbonate is 36 BUN is 36 WBC is 10.1. Trilogy has been approved and waiting. Working with discharge planning waiting for home trilogy otherwise patient has been ready to be discharged. Discussed with nursing staff, treatment and plan discussed. Please note that this chart was generated using voice recognition Dragon dictation software. Although every effort was made to ensure the accuracy of this automated transcription, some errors in transcription may have occurred.      Cindy Harrison MD  3/30/2022 11:24 AM

## 2022-03-30 NOTE — PLAN OF CARE
Problem: Pain:  Goal: Pain level will decrease  Description: Pain level will decrease  Outcome: Ongoing  Goal: Control of acute pain  Description: Control of acute pain  Outcome: Ongoing  Goal: Control of chronic pain  Description: Control of chronic pain  Outcome: Ongoing     Problem: Falls - Risk of:  Goal: Will remain free from falls  Description: Will remain free from falls  Outcome: Ongoing  Goal: Absence of physical injury  Description: Absence of physical injury  Outcome: Ongoing     Problem: OXYGENATION/RESPIRATORY FUNCTION  Goal: Patient will maintain patent airway  Outcome: Ongoing  Goal: Patient will achieve/maintain normal respiratory rate/effort  Description: Respiratory rate and effort will be within normal limits for the patient  Outcome: Ongoing     Problem: Skin Integrity:  Goal: Will show no infection signs and symptoms  Description: Will show no infection signs and symptoms  Outcome: Ongoing  Goal: Absence of new skin breakdown  Description: Absence of new skin breakdown  Outcome: Ongoing

## 2022-03-30 NOTE — PLAN OF CARE
Problem: OXYGENATION/RESPIRATORY FUNCTION  Goal: Patient will maintain patent airway  3/30/2022 1022 by Trevor Torres RCP  Outcome: Ongoing  3/30/2022 0419 by Miguelina Domínguez RN  Outcome: Ongoing  Goal: Patient will achieve/maintain normal respiratory rate/effort  Description: Respiratory rate and effort will be within normal limits for the patient  3/30/2022 1022 by Trevor Torres RCP  Outcome: Ongoing  3/30/2022 0419 by Miguelina Domínguez RN  Outcome: Ongoing     Problem: Skin Integrity:  Goal: Will show no infection signs and symptoms  Description: Will show no infection signs and symptoms  3/30/2022 1022 by Trevor Torres RCP  Outcome: Ongoing  3/30/2022 0419 by Miguelina Domínguez RN  Outcome: Ongoing  Goal: Absence of new skin breakdown  Description: Absence of new skin breakdown  3/30/2022 1022 by Trevor Torres RCP  Outcome: Ongoing  3/30/2022 0419 by Miguelina Domínguez RN  Outcome: Ongoing     PROVIDE ADEQUATE OXYGENATION WITH ACCEPTABLE SP02/ABG'S     [x]         IDENTIFY APPROPRIATE OXYGEN THERAPY  [x]         MONITOR SP02/ABG'S AS NEEDED   [x]         PATIENT EDUCATION AS NEEDED     NON INVASIVE VENTILATION  PROVIDE OPTIMAL VENTILATION/ACCEPTABLE SP02  IMPLEMENT NON INVASIVE VENTILATION PROTOCOL  ASSESSMENT SKIN INTEGRITY  PATIENT EDUCATION AS NEEDED  BIPAP AS NEEDED

## 2022-03-30 NOTE — CARE COORDINATION
Met with pt to discuss transitional planning. Her son plans to bring cpap machine so we can see if it works and if mask here is compatible. Spoke to Fausto from [facility] [place]. He relates that they called the insurance company and they said if Dr Jeremy Velásquez calls [phone], they can put a rush on noninvasive vent as it is pending hospital discharge. Notified Dr Jeremy Velásquez. Received call from Dr Tia Joiner that he called the number provided by [facility] 3 different times and he was transferred multiple times and received the answer that they have 14 days to approve. PS sent to medicine resident to attempt to call as Dr Tia Joiner requested    [phone] pt's son brought cpap to bedside.  RT notified

## 2022-03-30 NOTE — PROGRESS NOTES
[facility]  Internal Medicine Teaching Residency Program  Inpatient Daily Progress Note  ______________________________________________________________________________    Patient: Shannan Liu  YOB: 1975   [facility]:[id number]    Acct: [de-identified]     Room: 2008/2008-01  Admit date: 3/18/2022  Today's date: 03/30/22  Number of days in the hospital: 12    SUBJECTIVE   Admitting Diagnosis: Pneumonia  CC: Shortness of breath    - Pt examined at bedside. Chart & results reviewed. - No acute events overnight  - Pt experienced mild chest pain overnight secondary to cough which has resolved this morning   - Patient resting comfortably in bed  - Afebrile  - Vital signs stable    Plan for today:  - Awaiting pulmonology clearance for discharge and trilogy ventilator. If son can bring pt's CPAP and it can be fitted with a mask pt can be discharged. ROS:  Constitutional:  negative for chills, fevers, sweats  Respiratory:  negative for cough, dyspnea on exertion, hemoptysis, shortness of breath, wheezing  Cardiovascular:  negative for chest pain, chest pressure/discomfort, lower extremity edema, palpitations  Gastrointestinal:  negative for abdominal pain, constipation, diarrhea, nausea, vomiting  Neurological:  negative for dizziness, headache    BRIEF HISTORY     The patient is a pleasant 46 y.o. female presents with a chief complaint of shortness of breath.  Past medical history significant for CHF, HIEN, COPD on 3-4 L of oxygen, asthma, hyperlipidemia, hypertension.  Patient states that she usually uses 3 to 4 L of oxygen.  Patient was brought in through the EMS when she became significantly short of breath this morning and her saturation dropped to low 70s and high 60s. .  According to the EMS, patient's oxygen tubing was kinked and she was saturating at 70%.  This improved with new tubing and patient's oxygen saturation improved to 90%.  Patient states that EMS placed her on a nonrebreather mask.     Patient states that she experiences chest pain occasionally- the pain is substernal, is at rest and sometimes pain decreases with changing position.  Patient states that the pain is sudden in onset describes the character as a pressure-like sensation, it radiates to the back. OBJECTIVE     Vital Signs:  /79   Pulse 69   Temp 98.2 °F (36.8 °C) (Oral)   Resp 16   Ht 5' 6\" (1.676 m)   Wt (!) 355 lb (161 kg)   SpO2 93%   BMI 57.30 kg/m²     Temp (24hrs), Av.3 °F (36.8 °C), Min:97.7 °F (36.5 °C), Max:98.9 °F (37.2 °C)    In: 755   Out: 1900 [Urine:1900]    Physical Exam:  Physical Exam  Vitals and nursing note reviewed. Constitutional:       Appearance: She is obese. HENT:      Head: Normocephalic and atraumatic. Nose: Nose normal.      Mouth/Throat:      Mouth: Mucous membranes are moist.      Pharynx: Oropharynx is clear. Eyes:      Extraocular Movements: Extraocular movements intact. Conjunctiva/sclera: Conjunctivae normal.      Pupils: Pupils are equal, round, and reactive to light. Cardiovascular:      Rate and Rhythm: Normal rate and regular rhythm. Pulses: Normal pulses. Heart sounds: Normal heart sounds. No murmur heard. No friction rub. No gallop. Pulmonary:      Effort: Pulmonary effort is normal. No respiratory distress. Breath sounds: Normal breath sounds. No stridor. No wheezing, rhonchi or rales. Chest:      Chest wall: No tenderness. Abdominal:      General: Abdomen is flat. Bowel sounds are normal. There is no distension. Palpations: Abdomen is soft. There is no mass. Tenderness: There is no abdominal tenderness. There is no guarding or rebound. Hernia: No hernia is present. Musculoskeletal:         General: No swelling, tenderness, deformity or signs of injury. Normal range of motion. Cervical back: Normal range of motion. Right lower leg: No edema.       Left lower leg: No edema.   Skin:     General: Skin is warm. Neurological:      General: No focal deficit present. Mental Status: She is alert and oriented to person, place, and time. Mental status is at baseline. Psychiatric:         Mood and Affect: Mood normal.         Behavior: Behavior normal.         Thought Content:  Thought content normal.         Judgment: Judgment normal.           Medications:  Scheduled Medications:    bumetanide  2 mg Oral BID    insulin lispro  0-6 Units SubCUTAneous TID WC    insulin lispro  0-3 Units SubCUTAneous Nightly    docusate sodium  100 mg Oral Daily    hydrALAZINE  25 mg Oral 3 times per day    sodium chloride flush  5-40 mL IntraVENous 2 times per day    enoxaparin  30 mg SubCUTAneous BID    amLODIPine  5 mg Oral Daily    atorvastatin  40 mg Oral Nightly    budesonide-formoterol  2 puff Inhalation BID    fluticasone  1 spray Nasal Daily    folic acid  1 mg Oral Daily    gabapentin  300 mg Oral TID    isosorbide dinitrate  20 mg Oral TID    pantoprazole  40 mg Oral QAM AC    sertraline  100 mg Oral Daily    spironolactone  25 mg Oral Daily    tiotropium  2 puff Inhalation Daily    guaiFENesin  600 mg Oral BID     Continuous Infusions:    dextrose      sodium chloride 25 mL (03/20/22 0840)     PRN Medicationsalbuterol sulfate HFA, 2 puff, Q6H PRN  LORazepam, 1 mg, Q4H PRN  glucose, 15 g, PRN  dextrose, 12.5 g, PRN  glucagon (rDNA), 1 mg, PRN  dextrose, 100 mL/hr, PRN  sodium chloride flush, 5-40 mL, PRN  sodium chloride, 25 mL, PRN  ondansetron, 4 mg, Q8H PRN   Or  ondansetron, 4 mg, Q6H PRN  polyethylene glycol, 17 g, Daily PRN  acetaminophen, 650 mg, Q6H PRN   Or  acetaminophen, 650 mg, Q6H PRN  oxyCODONE-acetaminophen, 2 tablet, Q6H PRN  sodium chloride, 1 spray, PRN        Diagnostic Labs:  CBC:   Recent Labs     03/28/22  0415 03/29/22  0531   WBC 13.6* 12.2*   RBC 4.05 3.99   HGB 10.4* 10.3*   HCT 35.8* 34.4*   MCV 88.4 86.2   RDW 15.7* 15.3*    276 BMP:   Recent Labs     03/28/22  0501 03/29/22  0531    141   K 3.9 3.9   CL 91* 92*   CO2 30 36*   BUN 36* 41*   CREATININE 0.97* 0.97*     BNP: No results for input(s): BNP in the last 72 hours. PT/INR: No results for input(s): PROTIME, INR in the last 72 hours. APTT: No results for input(s): APTT in the last 72 hours. CARDIAC ENZYMES: No results for input(s): CKMB, CKMBINDEX, TROPONINI in the last 72 hours. Invalid input(s): CKTOTAL;3  FASTING LIPID PANEL:  Lab Results   Component Value Date    CHOL 144 11/17/2018    HDL 42 10/07/2020    TRIG 68 11/17/2018     LIVER PROFILE: No results for input(s): AST, ALT, ALB, BILIDIR, BILITOT, ALKPHOS in the last 72 hours. MICROBIOLOGY:   Lab Results   Component Value Date/Time    CULTURE NO GROWTH 6 DAYS 06/29/2021 07:03 PM       Imaging:    No results found. ASSESSMENT & PLAN     Assessment and Plan:    Principal Problem:    Pneumonia  Active Problems:    HTN (hypertension)    COPD exacerbation (HCC)    Pulmonary hypertension, moderate to severe (HCC)    Morbid obesity with BMI of 50.0-59.9, adult (HCC)    STEPH (obstructive sleep apnea)    Normocytic anemia    Dyspnea    Prediabetes    Hyperlipidemia    Hypokalemia    Hypomagnesemia  Resolved Problems:    * No resolved hospital problems. *      Community acquired pneumonia - Levaquin course completed 3/26     Acute on chronic CHF; HFpEF EF 75% 02/2019; resumed home isordil 20 mg tid and aldactone 25 mg qd; Transition to PO Bumex 2mg BID. Echo result indicative of EF of 60% with severe pulmonary hypertension     COPD exacerbation - pulmonology on board; continue albuterol, symbicort, spiriva. Started on predisone 20 mg for 5 days completed. Pulmonology following.  Recommendations appreciated. Trilogy vent pending.      Acute on chronic hypoxic respiratory failure secondary to #2 and #3     HTN - Norvasc 5 mg qd, hydralazine 25 mg q8h     HLD - Lipitor 40 mg qd     Type 2 DM HbA1C 6.4 03/22- Sugars well controlled. Neither Lantus nor Sliding scale Insulin needed currently. Hypoglycemia protocol.     MDD - Zoloft     Morbid obesity - Counseled      DVT ppx: Lovenox  GI ppx: Protonix     PT/OT/SW: On board  Discharge Planning: Medically discharged. [employer] approval pending.  efforts appreciated. Kaylen Lezama MD  Internal Medicine Resident, PGY-1  [address], [place]   7:07 AM 3/30/2022     Please note that part of this chart was generated using voice recognition dictation software. Although every effort was made to ensure the accuracy of this automated transcription, some errors in transcription may have occurred. I have discussed the care of [address] [place] [address] , including pertinent history and exam findings,    today with the resident. I have seen and examined the patient and the key elements of all parts of the encounter have been performed by me . I agree with the assessment, plan and orders as documented by the resident. Principal Problem:    Pneumonia  Active Problems:    HTN (hypertension)    COPD exacerbation (HCC)    Pulmonary hypertension, moderate to severe (HCC)    Morbid obesity with BMI of 50.0-59.9, adult (HCC)    STEPH (obstructive sleep apnea)    Normocytic anemia    Dyspnea    Prediabetes    Hyperlipidemia    Hypokalemia    Hypomagnesemia  Resolved Problems:    * No resolved hospital problems. *        Overall  course ;                                   are improving over time.         Clinically doing better  DC planning          Electronically signed by Lex Cowden, MD

## 2022-03-30 NOTE — PROGRESS NOTES
PULMONARY & CRITICAL CARE MEDICINE PROGRESS  NOTE     Patient:  Anita Retana  MRN: [id number]  Admit date: 3/18/2022  Primary Care Physician: Lewis Washington DO  Consulting Physician: Bina Fuentes MD  CODE Status: Full Code  LOS: 12    SUBJECTIVE     I personally interviewed/examined the patient, reviewed interval history and interpreted all available radiographic, laboratory data at the time of service. Chief Compliant/Reason for Initial Consult:   Acute on chronic hypoxic hypercapnic hypercapnic respiratory failure  COPD/CHF exacerbation  Pneumonia/pulmonary edema    Brief Hospital Course: The patient is a 55 y.o. female presents with complaint of shortness of breath, associated with stuffy nose, productive cough, clear sputum. Patient desatted to high 60s and low 70s. According to EMS, patient's oxygen tubing was kinked and was saturating at 70%. Improved with new tubing. She was placed on nonrebreather. Patient also complained of occasional substernal chest pain pressure-like sensation radiating to back at the time of presentation. On presentation, patient afebrile hemodynamically stable saturating at 96% on high flow 90% FiO2 30 L/minute   Pertinent labs: proBNP 1086  WBC 11.5  Procalcitonin 0.25  Negative for COVID-19 testing  VBG: pH 7.26/PCO2 85/PO2 40.6/bicarb 37. Acute on chronic hypercapnic respiratory acidosis with appropriate metabolic compensation  Chest x-ray: Patchy airspace opacity right worse than left. CT PE: Negative for PE. Groundglass opacity present. Reactive adenopathy throughout mediastinum and hilar region. Patchy consolidation right lower lobe. Interval History:  03/30/22     Patient seen and examined in her room  Saturating well on 5 L oxygen via nasal cannula  She wore BiPAP overnight, 14/8, 50% FiO2  No complaints this morning  On p.o.  Bumex 2 mg p.o. twice daily and Aldactone 12 Mg once daily, urine output: 1.9 L / 24 hours creatinine 1.01  WBC 10  Hemoglobin 10, stable    Chest x-ray 2022 shows better aeration of right lower lung field right lower lobe infiltrate/atelectasis improved cardiomegaly present  Echocardiogram on 2022 shows severe pulmonary hypertension with estimated RVSP 80 with right ventricular dilatation and volume overload and moderate to severe TR    Review of Systems:  Review of Systems   Constitutional: Negative for fatigue and fever. HENT: Negative for postnasal drip, rhinorrhea, sore throat, trouble swallowing and voice change. Eyes: Negative for photophobia and redness. Respiratory: Positive for shortness of breath. Negative for cough and wheezing. Cardiovascular: Positive for leg swelling. Negative for chest pain. Gastrointestinal: Negative for abdominal pain, diarrhea and vomiting. Endocrine: Negative for polydipsia, polyphagia and polyuria. Genitourinary: Negative for difficulty urinating, dysuria, frequency and hematuria. Musculoskeletal: Negative. Allergic/Immunologic: Negative. Neurological: Negative for dizziness, syncope, speech difficulty and headaches. Hematological: Negative for adenopathy. Does not bruise/bleed easily. Psychiatric/Behavioral: Negative.         OBJECTIVE     VITAL SIGNS:   LAST-  /64   Pulse 75   Temp 98.2 °F (36.8 °C) (Oral)   Resp 16   Ht 5' 6\" (1.676 m)   Wt (!) 355 lb (161 kg)   SpO2 93%   BMI 57.30 kg/m²   8-24 HR RANGE-  TEMP Temp  Av.4 °F (36.9 °C)  Min: 98.2 °F (36.8 °C)  Max: 98.9 °F (84.9 °C)   BP Systolic (54HHZ), TVT:193 , Min:111 , DESTINI:350      Diastolic (65PPY), WSM:35, Min:61, Max:82     PULSE Pulse  Av  Min: 69  Max: 85   RR Resp  Av  Min: 16  Max: 16   O2 SAT SpO2  Av.7 %  Min: 93 %  Max: 95 %   OXYGEN DELIVERY O2 Flow Rate (L/min)  Av L/min  Min: 5 L/min  Max: 5 L/min     Systemic Examination:   Physical Exam  General appearance - looks comfortable and in mildly tachypneic and not in acute distress  Mental status - alert, oriented to person, place, and time  Eyes - pupils equal and reactive, extraocular eye movements intact  Mouth - mucous membranes moist, pharynx normal without lesions, Mallampati 2  Neck -Short thick neck supple, no significant adenopathy  Chest -decreased bilateral breath sounds. No rhonchi/wheezing/crackles. No usage of accessory muscles of respiration. Heart - normal rate, regular rhythm, normal S1, S2, no murmurs, rubs, clicks or gallops  Abdomen - soft, nontender, nondistended, no masses or organomegaly  Neurological - alert, oriented, normal speech, no focal findings or movement disorder noted  Extremities - peripheral pulses normal, positive pedal edema, no clubbing or cyanosis  Skin - normal coloration and turgor, no rashes, no suspicious skin lesions noted     DATA REVIEW     Medications:  Scheduled Meds:   bumetanide  1 mg Oral BID    insulin lispro  0-6 Units SubCUTAneous TID WC    insulin lispro  0-3 Units SubCUTAneous Nightly    docusate sodium  100 mg Oral Daily    hydrALAZINE  25 mg Oral 3 times per day    sodium chloride flush  5-40 mL IntraVENous 2 times per day    enoxaparin  30 mg SubCUTAneous BID    amLODIPine  5 mg Oral Daily    atorvastatin  40 mg Oral Nightly    budesonide-formoterol  2 puff Inhalation BID    fluticasone  1 spray Nasal Daily    folic acid  1 mg Oral Daily    gabapentin  300 mg Oral TID    isosorbide dinitrate  20 mg Oral TID    pantoprazole  40 mg Oral QAM AC    sertraline  100 mg Oral Daily    spironolactone  25 mg Oral Daily    tiotropium  2 puff Inhalation Daily    guaiFENesin  600 mg Oral BID     Continuous Infusions:   dextrose      sodium chloride 25 mL (03/20/22 0840)     LABS:-  ABG:   No results for input(s): POCPH, POCPCO2, POCPO2, POCHCO3, MMBT4SVO in the last 72 hours.   CBC:   Recent Labs     03/28/22  0415 03/29/22  0531 03/30/22  0718   WBC 13.6* 12.2* 10.1   HGB 10.4* 10.3* 10.1*   HCT 35.8* 34.4* 35.4*   MCV 88.4 86.2 88.1    276 275   LYMPHOPCT 20* 17* 24   RBC 4.05 3.99 4.02   MCH 25.7 25.8 25.1*   MCHC 29.1 29.9 28.5   RDW 15.7* 15.3* 15.5*     BMP:   Recent Labs     03/28/22  0501 03/29/22  0531 03/30/22  0718    141 141   K 3.9 3.9 3.7   CL 91* 92* 94*   CO2 30 36* 36*   BUN 36* 41* 36*   CREATININE 0.97* 0.97* 1.01*   GLUCOSE 225* 208* 156*     Liver Function Test:   No results for input(s): PROT, LABALBU, ALT, AST, GGT, ALKPHOS, BILITOT in the last 72 hours. Amylase/Lipase:  No results for input(s): AMYLASE, LIPASE in the last 72 hours. Coagulation Profile:   No results for input(s): INR, PROTIME, APTT in the last 72 hours. Cardiac Enzymes:  No results for input(s): CKTOTAL, CKMB, CKMBINDEX, TROPONINI in the last 72 hours. Lactic Acid:  No results found for: LACTA  BNP:   No results found for: BNP  D-Dimer:  Lab Results   Component Value Date    DDIMER 0.86 03/19/2022     Others:   Lab Results   Component Value Date    TSH 2.36 02/23/2019     Lab Results   Component Value Date    LAUREN NEGATIVE 05/07/2019    CRP 8.0 (H) 06/09/2018     No results found for: Amy Gun  Lab Results   Component Value Date    IRON 34 (L) 04/23/2020    TIBC 235 (L) 04/23/2020    FERRITIN 275 (H) 04/23/2020     No results found for: SPEP, UPEP  No results found for: PSA, CEA, , EL7045,     Input/Output:    Intake/Output Summary (Last 24 hours) at 3/30/2022 1419  Last data filed at 3/30/2022 0000  Gross per 24 hour   Intake    Output 1900 ml   Net -1900 ml       Microbiology:  No results for input(s): SPECDESC, SPECDESC, SPECIAL, CULTURE, CULTURE, STATUS, ORG, CDIFFTOXPCR, CAMPYLOBPCR, SALMONELLAPC, SHIGAPCR, SHIGELLAPCR, MPNEUG, MPNEUM, LACTOQL in the last 72 hours. Pathology:    Radiology reports:  XR CHEST PORTABLE   Final Result   Improvement in the the right basal infiltrate.          FL MODIFIED BARIUM SWALLOW W VIDEO   Final Result   No penetration or aspiration with the above administered substances. Please see separate speech pathology report for full discussion of findings   and recommendations. XR CHEST PORTABLE   Final Result   Findings suggestive of worsening bibasilar pneumonia. CT CHEST PULMONARY EMBOLISM W CONTRAST   Final Result   No evidence of pulmonary embolism. There is patchy ill-defined opacification   lower lung fields bilaterally suggesting infiltrate with ground-glass opacity   concerning for pneumonia as well in the upper lung fields. Reactive   adenopathy throughout the mediastinum and hilar regions. RECOMMENDATIONS:   Unavailable         VL DUP LOWER EXTREMITY VENOUS BILATERAL   Final Result      XR CHEST PORTABLE   Final Result   Stable cardiomegaly with mild pulmonary vascular congestion. Echocardiogram:   Results for orders placed during the hospital encounter of 02/22/19    ECHO Complete 2D W Doppler W Color    Narrative  Transthoracic Echocardiography Report (TTE)    Patient Name PRICE       Date of Study               02/25/2019  Rosangela Nery    Date of      1975  Gender                      Female  Birth    Age          37 year(s)  Race                        Black    Room Number  3001        Height:                     60 inch, 152.4 cm    Corporate ID [id number]  Weight:                     412 pounds, 186.9 kg  #    Patient Acct [de-identified]   BSA:          2.54 m^2      BMI:      80.46  #                                                              kg/m^2    MR #         [id number]     Sonographer                 Babmi Ray    Accession #  [id number]   Interpreting Physician      [facility]    Fellow                   Referring Nurse  Practitioner    Interpreting             Referring Physician         Jamison Hoskins,  Shea    Type of Study    TTE procedure:2D Echocardiogram, M-Mode, Doppler, Color Doppler.     Procedure Date  Date: 02/25/2019 Start: 07:44 AM    Study Location: [facility]  Technical Quality: Poor visualization due to body habitus. History / Tech. Comments:  Procedure explained to patient. Very technically difficult study due to body  habitus. 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    - Sulfa. CONCLUSIONS    Summary  Technically difficult study. Left ventricle is normal in size. Mild concentric LVH Global left  ventricular systolic function appears hyperdynamic Calculated ejection  fraction is 75% . Mild left ventricular hypertrophy. Moderately dilated right ventricle size and severely decreased systolic  function. Estimated right ventricular systolic pressure is 44 mmHg. Moderate pulmonary hypertension. mildly elevated RA filling pressure . Signature  ----------------------------------------------------------------------------  Electronically signed by Isiah Marie(Interpreting physician) on  02/25/2019 03:15 PM  ----------------------------------------------------------------------------    ----------------------------------------------------------------------------  Electronically signed by Bambi Ray(Sonographer) on 02/25/2019  09:28 AM  ----------------------------------------------------------------------------  FINDINGS  Left Atrium  Left atrium is normal in size. Left Ventricle  Technically difficult study. Left ventricle is normal in size. Mld  concentric LVH Global left ventricular systolic function appears  hyperdynamic Calculated ejection fraction is 75% . Mild left ventricular hypertrophy. Right Atrium  Normal right atrial dimension. Right Ventricle  Moderately dilated right ventricle size and severely decreased systolic  function. Mitral Valve  Normal mitral valve structure. Trivial mitral regurgitation. No mitral stenosis.   Aortic Valve  Aortic valve structure and function normal.  Aortic valve is trileaflet. No aortic insufficiency. No aortic stenosis. Tricuspid Valve  Normal tricuspid valve leaflets. Trivial tricuspid regurgitation. No tricuspid stenosis. Estimated right ventricular systolic pressure is 44 mmHg. Moderate pulmonary hypertension. Pulmonic Valve  Pulmonic valve is grossly normal in structure and function. No pulmonic insufficiency seen  Pericardial Effusion  No pericardial effusion seen. Miscellaneous  Normal aortic root dimension. E/E'' = 10.75. IVC normal diameter & with impaired inspiratory collapse indicating mildly  elevated RA filling pressure . Subcostal views are not well visualized.     M-mode / 2D Measurements & Calculations:    LVIDd:4.6 cm(3.7 - 5.6 cm)       Diastolic STYFNM:25.5 ml  HVXVB:2.1 cm(2.2 - 4.0 cm)       Systolic EMUWOM:36.7 ml  CTTN:7.0 cm(0.6 - 1.1 cm)        Aortic Root:2.5 cm(2.0 - 3.7 cm)  LVPWd:1.2 cm(0.6 - 1.1 cm)       LA Dimension: 3.5 cm(1.9 - 4.0 cm)  Fractional Shortenin.96 %    LA volume/Index: 67.93 ml /27m^2  Calculated LVEF (%): 74.92 %     LVOT:1.8 cm  RVDd:4 cm    Mitral:                                 Aortic    Valve Area (P1/2-Time): 2.16 cm^2       Peak Velocity: 1.84 m/s  Peak E-Wave: 0.99 m/s                   Mean Velocity: 1.24 m/s  Peak A-Wave: 0.68 m/s                   Peak Gradient: 13.54 mmHg  E/A Ratio: 1.44                         Mean Gradient: 7 mmHg  Peak Gradient: 3.88 mmHg  Mean Gradient: 2 mmHg  Deceleration Time: 310 msec             Area (continuity): 1.71 cm^2  P1/2t: 102 msec                         AV VTI: 41.7 cm    Area (continuity): 1.91 cm^2  Mean Velocity: 0.58 m/s    Tricuspid:                              Pulmonic:    Estimated RVSP: 44 mmHg                 Peak Velocity: 1.41 m/s  Peak TR Velocity: 2.71 m/s              Peak Gradient: 7.95 mmHg  Peak TR Gradient: 29.3764 mmHg  Estimated RA Pressure: 15 mmHg    Estimated PASP: 44.38 mmHg    Diastology / Tissue Doppler  Septal Wall E' velocity:0.10 m/s  Septal Wall E/E':10.2  Lateral Wall E' velocity:0.09 m/s  Lateral Wall E/E':11.3      Cardiac Catheterization:   No results found for this or any previous visit. ASSESSMENT AND PLAN     Assessment:    //Acute on chronic hypoxic hypercapnic respiratory failure  //Right lower lobe pneumonia  //COPD exacerbation  //Bilateral groundglass infiltrate possibly atypical infection versus pulmonary edema  //Obesity hypoventilation syndrome  //Obstructive sleep apnea noncompliant with CPAP/BiPAP  //Severe pulmonary hypertension group 2/3  //Acute on chronic cor pulmonale  //Chronic diastolic CHF last echo 6245  //Hypertension  //Morbid obesity. Plan:      Patient is currently on 5 L nasal cannula to maintain saturation 88% or above. I have discussed with her to continue BiPAP every night and every time she is sleeping during the daytime. Trilogy/noninvasive ventilator is approved and waiting for delivery  Per case management, trilogy vent will take 2 more weeks. Discussed with  about sending her on BiPAP if DME allows. Meanwhile, requested patient's family to bring her CPAP equipment to see if the mask matches with her equipment. Continue with Symbicort and Spiriva  Encourage incentive spirometry, pulmonary toilet, aspiration precautions and ambulation out of bed to chair  Currently on Bumex 2 mg twice daily would recommend monitoring of electrolytes, renal function management per primary service. Continue to monitor I/O with a goal of negative fluid balance  Antimicrobials reviewed; finished levofloxacin  Finished 5 days of prednisone  DVT prophylaxis on Lovenox 30 mg twice daily.     Her pulmonary hypertension and cor pulmonale is group 2 mainly possibly contributed by diastolic CHF and as mentioned above need oxygen control of hypercapnia and NIV use to improve right ventricular function and will ultimately need follow-up echocardiogram after compliance with BiPAP and continued use of oxygen. Consideration of right heart catheter then if continue to have significant right heart dysfunction. I had discussed with the patient the echo finding I have also discussed with her about severe pulmonary hypertension and right ventricular failure and the need for continued NIV/BiPAP at home and also compliance with oxygen discussed. Physical/occupational/speech therapy; increase activity as tolerated  Discussed with nursing staff, treatment and plan discussed. It was my pleasure to evaluate Deejay Gomez today. We will continue to follow. I would like to thank you for allowing me to participate in the care of this patient. Please feel free to call with any further questions or concerns. Marla Locke MD  PGY-3, Internal Medicine Resident  [facility], [facility]  3/30/2022 2:19 PM    Please note that this chart was generated using voice recognition Dragon dictation software. Although every effort was made to ensure the accuracy of this automated transcription, some errors in transcription may have occurred. Attending Physician Statement  I have discussed the care of Deejay Gomez, including pertinent history and exam findings with the resident. I have reviewed the key elements of all parts of the encounter with the resident. I have seen and examined the patient with the resident. I agree with the assessment and plan and status of the problem list as documented. Please see full note by pulmonary resident Dr. Roland Tapia  I seen the patient during the round today, chart reviewed, labs and medications reviewed. Overall she has been improving gradually. She remains on 5 L nasal cannula maintaining saturation most of the time 90%. She has not been out of bed to chair today. She denies much sputum production has mild cough no wheezing.   She has been using NIV/BiPAP and has been tolerating 14/8 at night had not been having significant

## 2022-03-30 NOTE — PLAN OF CARE
BRONCHOSPASM/BRONCHOCONSTRICTION     [x]         IMPROVE AERATION/BREATH SOUNDS  [x]   ADMINISTER BRONCHODILATOR THERAPY AS APPROPRIATE  [x]   ASSESS BREATH SOUNDS  []   IMPLEMENT AEROSOL/MDI PROTOCOL  [x]   PATIENT EDUCATION AS NEEDED     NON INVASIVE VENTILATION    PROVIDE OPTIMAL VENTILATION/ACCEPTABLE SP02  IMPLEMENT NON INVASIVE VENTILATION PROTOCOL  ASSESSMENT SKIN INTEGRITY  PATIENT EDUCATION AS NEEDED  BIPAP AS NEEDED

## 2022-03-31 NOTE — PROGRESS NOTES
Marcio Hutson, RT, communicated with writer that his equipment pieces did not fit with patient's home bipap. Attending MD made aware.

## 2022-03-31 NOTE — PROGRESS NOTES
[facility]  Internal Medicine Teaching Residency Program  Inpatient Daily Progress Note  ______________________________________________________________________________    Patient: Xiang Maxwell  YOB: 1975   HVJ:[id number]    Acct: [de-identified]     Room: 2008/2008-01  Admit date: 3/18/2022  Today's date: 03/31/22  Number of days in the hospital: 13    SUBJECTIVE   Admitting Diagnosis: Pneumonia  CC: Shortness of breath    - Pt examined at bedside. Chart & results reviewed. - No acute events over  - Patient resting comfortably in bed  - Respiratory therapy doubtful they will be able to find a proper mask for patient CPAP machine  - Afebrile  - Vital signs stable    Plan for today:  - Attempt to find proper equipment for CPAP machine  - Call [email or web address] to [employer] for approval for trilogy    ROS:  Constitutional:  negative for chills, fevers, sweats  Respiratory:  negative for cough, dyspnea on exertion, hemoptysis, shortness of breath, wheezing  Cardiovascular:  negative for chest pain, chest pressure/discomfort, lower extremity edema, palpitations  Gastrointestinal:  negative for abdominal pain, constipation, diarrhea, nausea, vomiting  Neurological:  negative for dizziness, headache    BRIEF HISTORY     The patient is a pleasant 46 y.o. female presents with a chief complaint of shortness of breath.  Past medical history significant for CHF, HIEN, COPD on 3-4 L of oxygen, asthma, hyperlipidemia, hypertension.  Patient states that she usually uses 3 to 4 L of oxygen.  Patient was brought in through the EMS when she became significantly short of breath this morning and her saturation dropped to low 70s and high 60s. .  According to the EMS, patient's oxygen tubing was kinked and she was saturating at 70%.  This improved with new tubing and patient's oxygen saturation improved to 90%.  Patient states that EMS placed her on a nonrebreather mask.     Patient states that she experiences chest pain occasionally- the pain is substernal, is at rest and sometimes pain decreases with changing position.  Patient states that the pain is sudden in onset describes the character as a pressure-like sensation, it radiates to the back. OBJECTIVE     Vital Signs:  BP (!) 112/58   Pulse 80   Temp 97.6 °F (36.4 °C) (Axillary)   Resp 17   Ht 5' 6\" (1.676 m)   Wt (!) 355 lb (161 kg)   SpO2 96%   BMI 57.30 kg/m²     Temp (24hrs), Av °F (36.7 °C), Min:97.6 °F (36.4 °C), Max:98.2 °F (36.8 °C)    In: -   Out:  [Urine:2000]    Physical Exam:  Physical Exam  Vitals and nursing note reviewed. Constitutional:       Appearance: She is obese. HENT:      Head: Normocephalic and atraumatic. Nose: Nose normal.      Mouth/Throat:      Mouth: Mucous membranes are moist.      Pharynx: Oropharynx is clear. Eyes:      Extraocular Movements: Extraocular movements intact. Conjunctiva/sclera: Conjunctivae normal.      Pupils: Pupils are equal, round, and reactive to light. Cardiovascular:      Rate and Rhythm: Normal rate and regular rhythm. Pulses: Normal pulses. Heart sounds: Normal heart sounds. No murmur heard. No friction rub. No gallop. Pulmonary:      Effort: Pulmonary effort is normal. No respiratory distress. Breath sounds: Normal breath sounds. No stridor. No wheezing, rhonchi or rales. Chest:      Chest wall: No tenderness. Abdominal:      General: Abdomen is flat. Bowel sounds are normal. There is no distension. Palpations: Abdomen is soft. There is no mass. Tenderness: There is no abdominal tenderness. There is no guarding or rebound. Hernia: No hernia is present. Musculoskeletal:         General: No swelling, tenderness, deformity or signs of injury. Normal range of motion. Cervical back: Normal range of motion. Right lower leg: No edema. Left lower leg: No edema.    Skin:     General: Skin is warm. Neurological:      General: No focal deficit present. Mental Status: She is alert and oriented to person, place, and time. Mental status is at baseline. Psychiatric:         Mood and Affect: Mood normal.         Behavior: Behavior normal.         Thought Content:  Thought content normal.         Judgment: Judgment normal.           Medications:  Scheduled Medications:    bumetanide  1 mg Oral BID    insulin lispro  0-6 Units SubCUTAneous TID WC    insulin lispro  0-3 Units SubCUTAneous Nightly    docusate sodium  100 mg Oral Daily    hydrALAZINE  25 mg Oral 3 times per day    sodium chloride flush  5-40 mL IntraVENous 2 times per day    enoxaparin  30 mg SubCUTAneous BID    amLODIPine  5 mg Oral Daily    atorvastatin  40 mg Oral Nightly    budesonide-formoterol  2 puff Inhalation BID    fluticasone  1 spray Nasal Daily    folic acid  1 mg Oral Daily    gabapentin  300 mg Oral TID    isosorbide dinitrate  20 mg Oral TID    pantoprazole  40 mg Oral QAM AC    sertraline  100 mg Oral Daily    spironolactone  25 mg Oral Daily    tiotropium  2 puff Inhalation Daily    guaiFENesin  600 mg Oral BID     Continuous Infusions:    dextrose      sodium chloride 25 mL (03/20/22 0840)     PRN Medicationsalbuterol sulfate HFA, 2 puff, Q6H PRN  LORazepam, 1 mg, Q4H PRN  glucose, 15 g, PRN  dextrose, 12.5 g, PRN  glucagon (rDNA), 1 mg, PRN  dextrose, 100 mL/hr, PRN  sodium chloride flush, 5-40 mL, PRN  sodium chloride, 25 mL, PRN  ondansetron, 4 mg, Q8H PRN   Or  ondansetron, 4 mg, Q6H PRN  polyethylene glycol, 17 g, Daily PRN  acetaminophen, 650 mg, Q6H PRN   Or  acetaminophen, 650 mg, Q6H PRN  oxyCODONE-acetaminophen, 2 tablet, Q6H PRN  sodium chloride, 1 spray, PRN        Diagnostic Labs:  CBC:   Recent Labs     03/29/22  0531 03/30/22  0718   WBC 12.2* 10.1   RBC 3.99 4.02   HGB 10.3* 10.1*   HCT 34.4* 35.4*   MCV 86.2 88.1   RDW 15.3* 15.5*    275     BMP:   Recent Labs 03/29/22  0531 03/30/22  0718    141   K 3.9 3.7   CL 92* 94*   CO2 36* 36*   BUN 41* 36*   CREATININE 0.97* 1.01*     BNP: No results for input(s): BNP in the last 72 hours. PT/INR: No results for input(s): PROTIME, INR in the last 72 hours. APTT: No results for input(s): APTT in the last 72 hours. CARDIAC ENZYMES: No results for input(s): CKMB, CKMBINDEX, TROPONINI in the last 72 hours. Invalid input(s): CKTOTAL;3  FASTING LIPID PANEL:  Lab Results   Component Value Date    CHOL 144 11/17/2018    HDL 42 10/07/2020    TRIG 68 11/17/2018     LIVER PROFILE: No results for input(s): AST, ALT, ALB, BILIDIR, BILITOT, ALKPHOS in the last 72 hours. MICROBIOLOGY:   Lab Results   Component Value Date/Time    CULTURE NO GROWTH 6 DAYS 06/29/2021 07:03 PM       Imaging:    No results found. ASSESSMENT & PLAN     Assessment and Plan:    Principal Problem:    Pneumonia  Active Problems:    HTN (hypertension)    COPD exacerbation (HCC)    Pulmonary hypertension, moderate to severe (HCC)    Morbid obesity with BMI of 50.0-59.9, adult (HCC)    STEPH (obstructive sleep apnea)    Normocytic anemia    Dyspnea    Prediabetes    Hyperlipidemia    Hypokalemia    Hypomagnesemia  Resolved Problems:    * No resolved hospital problems. *    Community acquired pneumonia - Levaquin course completed 3/26     Acute on chronic CHF; HFpEF EF 75% 02/2019; resumed home isordil 20 mg tid and aldactone 25 mg qd; Transition to PO Bumex 2mg BID. Echo result indicative of EF of 60% with severe pulmonary hypertension     COPD exacerbation - pulmonology on board; continue albuterol, symbicort, spiriva. Started on predisone 20 mg for 5 days completed. Pulmonology following. Recommendations appreciated. Trilogy vent pending.      Acute on chronic hypoxic respiratory failure secondary to #2 and #3     HTN - Norvasc 5 mg qd, hydralazine 25 mg q8h     HLD - Lipitor 40 mg qd     Type 2 DM HbA1C 6.4 03/22- Sugars well controlled.  Neither Lantus nor Sliding scale Insulin needed currently. Hypoglycemia protocol.     MDD - Zoloft     Morbid obesity - Counseled      DVT ppx: Lovenox  GI ppx: Protonix     PT/OT/SW: On board  Discharge Planning: Medically discharged. [employer] approval pending.  efforts appreciated.        Michael Louie MD  Internal Medicine Resident, PGY-1  [facility]; [place], [place]   5:21 AM 3/31/2022     Please note that part of this chart was generated using voice recognition dictation software. Although every effort was made to ensure the accuracy of this automated transcription, some errors in transcription may have occurred. I have discussed the care of [address] [place] [address] , including pertinent history and exam findings,    today with the resident. I have seen and examined the patient and the key elements of all parts of the encounter have been performed by me . I agree with the assessment, plan and orders as documented by the resident. Principal Problem:    Pneumonia  Active Problems:    HTN (hypertension)    COPD exacerbation (HCC)    Pulmonary hypertension, moderate to severe (HCC)    Morbid obesity with BMI of 50.0-59.9, adult (HCC)    STEPH (obstructive sleep apnea)    Normocytic anemia    Dyspnea    Prediabetes    Hyperlipidemia    Hypokalemia    Hypomagnesemia  Resolved Problems:    * No resolved hospital problems. *        Overall  course ;                                   are improving over time.         Awaiting placement  DC planning          Electronically signed by Katelyn Min MD

## 2022-03-31 NOTE — PROGRESS NOTES
Occupational Therapy  Facility/Department: [facility] 2  Daily Treatment Note  NAME: Amrik Jauregui  : 1975  MRN: [id number]    Date of Service: 3/31/2022    Discharge Recommendations:  Patient would benefit from continued therapy after discharge       Assessment   Performance deficits / Impairments: Decreased functional mobility ; Decreased endurance;Decreased ADL status; Decreased balance;Decreased high-level IADLs;Decreased safe awareness  Assessment: Pt would benefit from continued acute care and post acute care OT to address above listed deficits  Treatment Diagnosis: SOB  Prognosis: Fair  REQUIRES OT FOLLOW UP: Yes  Activity Tolerance  Activity Tolerance: Patient Tolerated treatment well;Patient limited by fatigue  Activity Tolerance: SOB  Safety Devices  Safety Devices in place: Yes  Type of devices: Left in bed;Call light within reach         Patient Diagnosis(es): The primary encounter diagnosis was Dyspnea, unspecified type. Diagnoses of Chronic respiratory failure with hypoxia and hypercapnia (HCC), Chronic obstructive pulmonary disease, unspecified COPD type ([address] Chronic respiratory acidosis, Pulmonary hypertension, moderate to severe ([address] and Cor pulmonale (chronic) ([address] were also pertinent to this visit. has a past medical history of Acute on chronic diastolic CHF (congestive heart failure) ([address] HIEN (acute kidney injury) ([address] HIEN (acute kidney injury) ([address] Asthma, CHF, Chronic obstructive lung disease ([address] Chronic respiratory failure with hypoxia ([address] COPD, Diabetes mellitus, new onset ([address] Former smoker, HTN, Hyperglycemia, Hyperlipidemia, Hypertension, Morbid obesity with BMI of 60.0-69.9, adult ([address] Neuromuscular disorder ([address] STEPH on CPAP, Oxygen dependent, Pedal edema, Pneumonia, Pulmonary hypertension, moderate to severe ([address] Torn meniscus, and Wears glasses. has a past surgical history that includes  section ();  Abdomen surgery; joint replacement; Cystoscopy (June 5, 2019); Cystoscopy (N/A, 6/5/2019); hc cath power picc triple (2/2/2018); pr office/outpt visit,procedure only (N/A, 2/17/2018); Tracheotomy (02/2018); Gastrostomy tube placement (02/2018); and tracheostomy closure (03/2018). Restrictions  Restrictions/Precautions  Restrictions/Precautions: General Precautions,Fall Risk,Contact Precautions,Up as Tolerated  Required Braces or Orthoses?: No  Position Activity Restriction  Other position/activity restrictions: Minimally ambulatory at baseline ~3 to 5 ft PTA  Subjective   General  Chart Reviewed: Yes  Patient assessed for rehabilitation services?: Yes  Family / Caregiver Present: No  Diagnosis: SOB, COPD exac, PNA  General Comment  Comments: Pt pleasant and cooperative  Pain Assessment  Clinical Progression: Not changed  Response to Pain Intervention: Patient Satisfied  Vital Signs  Patient Currently in Pain: Denies   Orientation  Orientation  Overall Orientation Status: Within Functional Limits  Objective    ADL  Feeding: Stand by assistance;Setup; Increased time to complete (able to open containers and feed self)  Grooming: Stand by assistance;Setup; Increased time to complete (wash face, brush teeth seated in bed w/HOB elevated per pt request)  UE Dressing: Minimal assistance;Setup; Increased time to complete (assist to snap, thread arms and tie gown)  LE Dressing: Maximum assistance;Setup; Increased time to complete (to don footies)  Toileting: Maximum assistance (wearing brief, has purewick to wall suction, Max-desiree care, apply lotion and change purewick)      Pt in bed upon arrival. Pt requested to do grooming in bed prior to getting up to chair. Pt transferred to EOB and then to recliner w/RW and no LOB. Pt needed vc's for safety w/transfers and func mob. Pt wore O2 throughout tx except to complete face washing which SpO2 dropped to 84%, O2 donned and SpO2 returned to 92% after approx 1 min of rest break.  Pt needs increased time and assist d/t fatigue, SOB with increased activity and rest breaks throughout tx.        Balance  Sitting Balance: Contact guard assistance (seated at EOB approx 2 min)  Standing Balance: Contact guard assistance (w/RW)  Standing Balance  Time: Pt stood approx 2-3 min for transfers and func mob  Activity: Pt stood bedside and BSC x2 this date each, short func mob using RW  Comment: w/RW  Functional Mobility  Functional - Mobility Device: Rolling Walker (Bariatric)  Assist Level: Contact guard assistance  Functional Mobility Comments: w/no LOB, vc's for safety  Bed mobility  Bridging: Stand by assistance  Rolling to Left: Minimal assistance  Supine to Sit: Minimal assistance  Sit to Supine: Unable to assess (left up in chair w/maxi lavonne lift pad)  Scooting: Minimal assistance  Transfers  Stand Step Transfers: Contact guard assistance  Sit to stand: Contact guard assistance  Stand to sit: Contact guard assistance  Transfer Comments: w/vc's for hand placement      Plan   Plan  Times per week: 3-5x    AM-PAC Score  AM-[facility] Inpatient Daily Activity Raw Score: 16 (03/31/22 1137)  AM-PAC Inpatient ADL T-Scale Score : 35.96 (03/31/22 1137)  ADL Inpatient CMS 0-100% Score: 53.32 (03/31/22 1137)  ADL Inpatient CMS G-Code Modifier : CK (03/31/22 1137)    Goals  Short term goals  Time Frame for Short term goals: Pt will by discharge  Short term goal 1: demo ADL UB bathing/dressing activity while sitting unsupported with increased time, setup, CGA  Short term goal 2: demo ADL LB bathing/dressing activity  with increased time, setup, mod A, sock-aid/reacher PRN  Short term goal 3: demo good safety awareness during short func mob at bedside using bariatric RW and SBA only  Short term goal 4: demo CGA for all bed mobility using bed rails PRN  Short term goal 5: demo/identify 2 EC/WS techniques during func activity with 1 cue only     Therapy Time   Individual Concurrent Group Co-treatment   Time In  1030         Time Out  1120         Minutes  38 total tx time      12 min co-tx           EUGENE URBAN, FISHER/L

## 2022-03-31 NOTE — PROGRESS NOTES
Writer spoke with RT Keturah, again regarding patient's home bipap machine to see if he had the chance to find any matching equipment that would fit her home bipap machine. He stated he found a couple of things but thought it was not promising and that he would be over in the morning during his next rounds to evaluate.

## 2022-03-31 NOTE — PROGRESS NOTES
Writer spoke with Darien Chavez RT, regarding patient's home bipap machine and seeing if we have equipment to match her machine so she can go home. Darien Chavez, RT, stated we do not have anything on the floor but he will check the RT office in the main hospital for any matching equipment pieces to fit her home bipap.

## 2022-03-31 NOTE — PROGRESS NOTES
PULMONARY & CRITICAL CARE MEDICINE PROGRESS  NOTE     Patient:  Shannan Liu  MRN: [id number]  Admit date: 3/18/2022  Primary Care Physician: Alexandru Hart DO  Consulting Physician: Johnson Galloway MD  CODE Status: Full Code  LOS: 13    SUBJECTIVE     I personally interviewed/examined the patient, reviewed interval history and interpreted all available radiographic, laboratory data at the time of service. Chief Compliant/Reason for Initial Consult:   Acute on chronic hypoxic hypercapnic hypercapnic respiratory failure  COPD/CHF exacerbation  Pneumonia/pulmonary edema    Brief Hospital Course: The patient is a 55 y.o. female presents with complaint of shortness of breath, associated with stuffy nose, productive cough, clear sputum. Patient desatted to high 60s and low 70s. According to EMS, patient's oxygen tubing was kinked and was saturating at 70%. Improved with new tubing. She was placed on nonrebreather. Patient also complained of occasional substernal chest pain pressure-like sensation radiating to back at the time of presentation. On presentation, patient afebrile hemodynamically stable saturating at 96% on high flow 90% FiO2 30 L/minute   Pertinent labs: proBNP 1086  WBC 11.5  Procalcitonin 0.25  Negative for COVID-19 testing  VBG: pH 7.26/PCO2 85/PO2 40.6/bicarb 37. Acute on chronic hypercapnic respiratory acidosis with appropriate metabolic compensation  Chest x-ray: Patchy airspace opacity right worse than left. CT PE: Negative for PE. Groundglass opacity present. Reactive adenopathy throughout mediastinum and hilar region. Patchy consolidation right lower lobe. Interval History:  03/31/22     No issues overnight. Vitally and hemodynamically stable. Continue to be on BiPAP alternating with nasal cannula. Denied any symptoms. Currently awaiting placement and trilogy vent.     Chest x-ray 03/22/2022 shows better aeration of right lower lung field right lower lobe infiltrate/atelectasis improved cardiomegaly present  Echocardiogram on 2022 shows severe pulmonary hypertension with estimated RVSP 80 with right ventricular dilatation and volume overload and moderate to severe TR    Review of Systems:  Review of Systems   Constitutional: Negative for fatigue and fever. HENT: Negative for postnasal drip, rhinorrhea, sore throat, trouble swallowing and voice change. Eyes: Negative for photophobia and redness. Respiratory: Negative for cough, shortness of breath and wheezing. Cardiovascular: Positive for leg swelling. Negative for chest pain. Gastrointestinal: Negative for abdominal pain, diarrhea and vomiting. Endocrine: Negative for polydipsia, polyphagia and polyuria. Genitourinary: Negative for difficulty urinating, dysuria, frequency and hematuria. Musculoskeletal: Negative. Allergic/Immunologic: Negative. Neurological: Negative for dizziness, syncope, speech difficulty and headaches. Hematological: Negative for adenopathy. Does not bruise/bleed easily. Psychiatric/Behavioral: Negative.       OBJECTIVE     VITAL SIGNS:   LAST-  /78   Pulse 76   Temp 97.7 °F (36.5 °C) (Oral)   Resp 16   Ht 5' 6\" (1.676 m)   Wt (!) 355 lb (161 kg)   SpO2 91%   BMI 57.30 kg/m²   8-24 HR RANGE-  TEMP Temp  Av °F (36.7 °C)  Min: 97.6 °F (36.4 °C)  Max: 98.2 °F (65.9 °C)   BP Systolic (65WJU), BKP:751 , Min:112 , RZQ:966      Diastolic (09JER), GABRIELA:67, Min:58, Max:78     PULSE Pulse  Av.5  Min: 75  Max: 87   RR Resp  Avg: 15.8  Min: 12  Max: 18   O2 SAT SpO2  Av.8 %  Min: 91 %  Max: 96 %   OXYGEN DELIVERY No data recorded     Systemic Examination:   Physical Exam  General appearance - looks comfortable and in mildly tachypneic and not in acute distress  Mental status - alert, oriented to person, place, and time  Eyes - pupils equal and reactive, extraocular eye movements intact  Mouth - mucous membranes moist, pharynx normal without lesions, Mallampati 2  Neck -Short thick neck supple, no significant adenopathy  Chest -decreased bilateral breath sounds. No rhonchi/wheezing/crackles. No usage of accessory muscles of respiration. Heart - normal rate, regular rhythm, normal S1, S2, no murmurs, rubs, clicks or gallops  Abdomen - soft, nontender, nondistended, no masses or organomegaly  Neurological - alert, oriented, normal speech, no focal findings or movement disorder noted  Extremities - peripheral pulses normal, positive pedal edema, no clubbing or cyanosis  Skin - normal coloration and turgor, no rashes, no suspicious skin lesions noted     DATA REVIEW     Medications:  Scheduled Meds:   bumetanide  1 mg Oral BID    insulin lispro  0-6 Units SubCUTAneous TID WC    insulin lispro  0-3 Units SubCUTAneous Nightly    docusate sodium  100 mg Oral Daily    hydrALAZINE  25 mg Oral 3 times per day    sodium chloride flush  5-40 mL IntraVENous 2 times per day    enoxaparin  30 mg SubCUTAneous BID    amLODIPine  5 mg Oral Daily    atorvastatin  40 mg Oral Nightly    budesonide-formoterol  2 puff Inhalation BID    fluticasone  1 spray Nasal Daily    folic acid  1 mg Oral Daily    gabapentin  300 mg Oral TID    isosorbide dinitrate  20 mg Oral TID    pantoprazole  40 mg Oral QAM AC    sertraline  100 mg Oral Daily    spironolactone  25 mg Oral Daily    tiotropium  2 puff Inhalation Daily    guaiFENesin  600 mg Oral BID     Continuous Infusions:   dextrose      sodium chloride 25 mL (03/20/22 0840)     LABS:-  ABG:   No results for input(s): POCPH, POCPCO2, POCPO2, POCHCO3, VCNC6ANP in the last 72 hours.   CBC:   Recent Labs     03/29/22 0531 03/30/22  0718   WBC 12.2* 10.1   HGB 10.3* 10.1*   HCT 34.4* 35.4*   MCV 86.2 88.1    275   LYMPHOPCT 17* 24   RBC 3.99 4.02   MCH 25.8 25.1*   MCHC 29.9 28.5   RDW 15.3* 15.5*     BMP:   Recent Labs     03/29/22  0531 03/30/22  0718    141 K 3.9 3.7   CL 92* 94*   CO2 36* 36*   BUN 41* 36*   CREATININE 0.97* 1.01*   GLUCOSE 208* 156*     Liver Function Test:   No results for input(s): PROT, LABALBU, ALT, AST, GGT, ALKPHOS, BILITOT in the last 72 hours. Amylase/Lipase:  No results for input(s): AMYLASE, LIPASE in the last 72 hours. Coagulation Profile:   No results for input(s): INR, PROTIME, APTT in the last 72 hours. Cardiac Enzymes:  No results for input(s): CKTOTAL, CKMB, CKMBINDEX, TROPONINI in the last 72 hours. Lactic Acid:  No results found for: LACTA  BNP:   No results found for: BNP  D-Dimer:  Lab Results   Component Value Date    DDIMER 0.86 03/19/2022     Others:   Lab Results   Component Value Date    TSH 2.36 02/23/2019     Lab Results   Component Value Date    LAUREN NEGATIVE 05/07/2019    CRP 8.0 (H) 06/09/2018     No results found for: Jaime Cardona  Lab Results   Component Value Date    IRON 34 (L) 04/23/2020    TIBC 235 (L) 04/23/2020    FERRITIN 275 (H) 04/23/2020     No results found for: SPEP, UPEP  No results found for: PSA, CEA, , WK3513,     Input/Output:    Intake/Output Summary (Last 24 hours) at 3/31/2022 0955  Last data filed at 3/31/2022 4120  Gross per 24 hour   Intake 600 ml   Output 1300 ml   Net -700 ml       Microbiology:  No results for input(s): SPECDESC, SPECDESC, SPECIAL, CULTURE, CULTURE, STATUS, ORG, CDIFFTOXPCR, CAMPYLOBPCR, SALMONELLAPC, SHIGAPCR, SHIGELLAPCR, MPNEUG, MPNEUM, LACTOQL in the last 72 hours. Pathology:    Radiology reports:  XR CHEST PORTABLE   Final Result   Improvement in the the right basal infiltrate. FL MODIFIED BARIUM SWALLOW W VIDEO   Final Result   No penetration or aspiration with the above administered substances. Please see separate speech pathology report for full discussion of findings   and recommendations. XR CHEST PORTABLE   Final Result   Findings suggestive of worsening bibasilar pneumonia.          CT CHEST PULMONARY EMBOLISM W CONTRAST Final Result   No evidence of pulmonary embolism. There is patchy ill-defined opacification   lower lung fields bilaterally suggesting infiltrate with ground-glass opacity   concerning for pneumonia as well in the upper lung fields. Reactive   adenopathy throughout the mediastinum and hilar regions. RECOMMENDATIONS:   Unavailable         VL DUP LOWER EXTREMITY VENOUS BILATERAL   Final Result      XR CHEST PORTABLE   Final Result   Stable cardiomegaly with mild pulmonary vascular congestion. Echocardiogram:   Results for orders placed during the hospital encounter of 02/22/19    ECHO Complete 2D W Doppler W Color    Narrative  Transthoracic Echocardiography Report (TTE)    Patient Name PRICE       Date of Study               02/25/2019  Evette Kirby    Date of      1975  Gender                      Female  Birth    Age          37 year(s)  Race                        Black    Room Number  3001        Height:                     60 inch, 152.4 cm    Corporate ID [id number]  Weight:                     412 pounds, 186.9 kg  #    Patient Acct [de-identified]   BSA:          2.54 m^2      BMI:      80.46  #                                                              kg/m^2    MR #         [id number]     Sonographer                 CoryBambi    Accession #  [id number]   Interpreting Physician      [facility]    Fellow                   Referring Nurse  Practitioner    Interpreting             Referring Physician         Shea Hicks    Type of Study    TTE procedure:2D Echocardiogram, M-Mode, Doppler, Color Doppler. Procedure Date  Date: 02/25/2019 Start: 07:44 AM    Study Location: [facility]  Technical Quality: Poor visualization due to body habitus. History / Tech. Comments:  Procedure explained to patient. Very technically difficult study due to body  habitus.   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    - Sulfa. CONCLUSIONS    Summary  Technically difficult study. Left ventricle is normal in size. Mild concentric LVH Global left  ventricular systolic function appears hyperdynamic Calculated ejection  fraction is 75% . Mild left ventricular hypertrophy. Moderately dilated right ventricle size and severely decreased systolic  function. Estimated right ventricular systolic pressure is 44 mmHg. Moderate pulmonary hypertension. mildly elevated RA filling pressure . Signature  ----------------------------------------------------------------------------  Electronically signed by Isiah Marie(Interpreting physician) on  02/25/2019 03:15 PM  ----------------------------------------------------------------------------    ----------------------------------------------------------------------------  Electronically signed by Bambi Ray(Sonographer) on 02/25/2019  09:28 AM  ----------------------------------------------------------------------------  FINDINGS  Left Atrium  Left atrium is normal in size. Left Ventricle  Technically difficult study. Left ventricle is normal in size. Mld  concentric LVH Global left ventricular systolic function appears  hyperdynamic Calculated ejection fraction is 75% . Mild left ventricular hypertrophy. Right Atrium  Normal right atrial dimension. Right Ventricle  Moderately dilated right ventricle size and severely decreased systolic  function. Mitral Valve  Normal mitral valve structure. Trivial mitral regurgitation. No mitral stenosis. Aortic Valve  Aortic valve structure and function normal.  Aortic valve is trileaflet. No aortic insufficiency. No aortic stenosis. Tricuspid Valve  Normal tricuspid valve leaflets. Trivial tricuspid regurgitation. No tricuspid stenosis. Estimated right ventricular systolic pressure is 44 mmHg. Moderate pulmonary hypertension.   Pulmonic Valve  Pulmonic valve is grossly normal in structure and function. No pulmonic insufficiency seen  Pericardial Effusion  No pericardial effusion seen. Miscellaneous  Normal aortic root dimension. E/E'' = 10.75. IVC normal diameter & with impaired inspiratory collapse indicating mildly  elevated RA filling pressure . Subcostal views are not well visualized. M-mode / 2D Measurements & Calculations:    LVIDd:4.6 cm(3.7 - 5.6 cm)       Diastolic YAUCRV:60.4 ml  QEATH:7.8 cm(2.2 - 4.0 cm)       Systolic NSNPAM:80.0 ml  QYSZ:4.3 cm(0.6 - 1.1 cm)        Aortic Root:2.5 cm(2.0 - 3.7 cm)  LVPWd:1.2 cm(0.6 - 1.1 cm)       LA Dimension: 3.5 cm(1.9 - 4.0 cm)  Fractional Shortenin.96 %    LA volume/Index: 67.93 ml /27m^2  Calculated LVEF (%): 74.92 %     LVOT:1.8 cm  RVDd:4 cm    Mitral:                                 Aortic    Valve Area (P1/2-Time): 2.16 cm^2       Peak Velocity: 1.84 m/s  Peak E-Wave: 0.99 m/s                   Mean Velocity: 1.24 m/s  Peak A-Wave: 0.68 m/s                   Peak Gradient: 13.54 mmHg  E/A Ratio: 1.44                         Mean Gradient: 7 mmHg  Peak Gradient: 3.88 mmHg  Mean Gradient: 2 mmHg  Deceleration Time: 310 msec             Area (continuity): 1.71 cm^2  P1/2t: 102 msec                         AV VTI: 41.7 cm    Area (continuity): 1.91 cm^2  Mean Velocity: 0.58 m/s    Tricuspid:                              Pulmonic:    Estimated RVSP: 44 mmHg                 Peak Velocity: 1.41 m/s  Peak TR Velocity: 2.71 m/s              Peak Gradient: 7.95 mmHg  Peak TR Gradient: 29.3764 mmHg  Estimated RA Pressure: 15 mmHg    Estimated PASP: 44.38 mmHg    Diastology / Tissue Doppler  Septal Wall E' velocity:0.10 m/s  Septal Wall E/E':10.2  Lateral Wall E' velocity:0.09 m/s  Lateral Wall E/E':11.3      Cardiac Catheterization:   No results found for this or any previous visit.     ASSESSMENT AND PLAN     Assessment:    //Acute on chronic hypoxic hypercapnic respiratory failure  //Right lower lobe pneumonia. Completed levofloxacin   //COPD exacerbation. Improved   //Bilateral groundglass infiltrate possibly atypical infection versus pulmonary edema  //Obesity hypoventilation syndrome  //Obstructive sleep apnea noncompliant with CPAP/BiPAP  //Severe pulmonary hypertension group 2/3  //Acute on chronic cor pulmonale  //Chronic diastolic CHF last echo 4987  //Hypertension  //Morbid obesity. Plan:  Patient is currently on 5 L nasal cannula to maintain saturation 88% or above. I have discussed with her to continue BiPAP every night and every time she is sleeping during the daytime. Trilogy/noninvasive ventilator is approved and waiting for delivery. Per case management, trilogy vent will take 2 more weeks. Meanwhile primary helping her to arrange CPAP machine equipments which can fit her. Continue with Symbicort and Spiriva  Encourage incentive spirometry, pulmonary toilet, aspiration precautions and ambulation out of bed to chair  Currently on Bumex 2 mg twice daily. Primary managing   Continue to monitor I/O with a goal of negative fluid balance  Antimicrobials reviewed; finished levofloxacin  Finished 5 days of prednisone  DVT prophylaxis on Lovenox 40 mg twice daily. I had discussed with the patient the echo finding I have also discussed with her about severe pulmonary hypertension and right ventricular failure and the need for continued NIV/BiPAP at home and also compliance with oxygen discussed. Physical/occupational/speech therapy; increase activity as tolerated  Discussed with nursing staff, treatment and plan discussed. It was my pleasure to evaluate Vincenzo Jacobo today. We will continue to follow. I would like to thank you for allowing me to participate in the care of this patient. Please feel free to call with any further questions or concerns.       Griselda Boyle MD.  Internal Medicine Resident PGY [address] [employer] [place] [address]   3/31/2022, 12:15 PM      Please note that this chart was generated using voice recognition Dragon dictation software. Although every effort was made to ensure the accuracy of this automated transcription, some errors in transcription may have occurred.

## 2022-03-31 NOTE — PLAN OF CARE
Problem: Pain:  Goal: Pain level will decrease  Description: Pain level will decrease  Outcome: Ongoing  Goal: Control of acute pain  Description: Control of acute pain  Outcome: Ongoing  Goal: Control of chronic pain  Description: Control of chronic pain  Outcome: Ongoing     Problem: Falls - Risk of:  Goal: Will remain free from falls  Description: Will remain free from falls  Outcome: Ongoing  Goal: Absence of physical injury  Description: Absence of physical injury  Outcome: Ongoing     Problem: OXYGENATION/RESPIRATORY FUNCTION  Goal: Patient will maintain patent airway  3/30/2022 2319 by Padma Bauer RN  Outcome: Ongoing  3/30/2022 1022 by Benita Nieves RCP  Outcome: Ongoing  Goal: Patient will achieve/maintain normal respiratory rate/effort  Description: Respiratory rate and effort will be within normal limits for the patient  3/30/2022 2319 by Padma Bauer RN  Outcome: Ongoing  3/30/2022 1022 by Benita Nieves RCP  Outcome: Ongoing     Problem: Skin Integrity:  Goal: Will show no infection signs and symptoms  Description: Will show no infection signs and symptoms  3/30/2022 2319 by Padma Bauer RN  Outcome: Ongoing  3/30/2022 1022 by Benita Nieves RCP  Outcome: Ongoing  Goal: Absence of new skin breakdown  Description: Absence of new skin breakdown  3/30/2022 2319 by Padma Bauer RN  Outcome: Ongoing  3/30/2022 1022 by Benita Nieves RCP  Outcome: Ongoing

## 2022-03-31 NOTE — PROGRESS NOTES
Nutrition Assessment     Type and Reason for Visit: Reassess    Nutrition Recommendations/Plan: Continue current diet. Encourage/monitor PO intakes as tolerated. Will monitor labs, weights, and plan of care. Nutrition Assessment:  Pt continues to have a good appetite and has been eating % of meals. Pt using Bipap and nasal cannula as needed. Last recorded BM 3/31. Labs reviewed: Glucose 153 mg/dL. Meds include: Colace, Humalog SS. Malnutrition Assessment:  Malnutrition Status: No malnutrition    Estimated Daily Nutrient Needs:  Energy (kcal): 11-12 kcal/kg = 3143-4688 kcals/day; Weight Used for Energy Requirements:  Current     Protein (g): 1.5 gm/kg = 85 gm pro/day; Weight Used for Protein Requirements:  Ideal        Fluid (ml/day): 1500 mL/day per diet order/MD    Nutrition Related Findings: Labs/Meds reviewed. Last BM 3/31. Current Nutrition Therapies:    ADULT DIET; Regular; 4 carb choices (60 gm/meal); Low Sodium (2 gm); 1500 ml    Anthropometric Measures:  · Height: 5' 6\" (167.6 cm)  · Current Body Wt: 355 lb (161 kg)   · BMI: 57.3    Nutrition Diagnosis:   No nutrition diagnosis at this time     Nutrition Interventions:   Food and/or Nutrient Delivery:  Continue Current Diet  Nutrition Education/Counseling:  No recommendation at this time   Coordination of Nutrition Care:  Continue to monitor while inpatient    Goals:  Meet % of estimated nutrition needs.        Nutrition Monitoring and Evaluation:   Behavioral-Environmental Outcomes:  None Identified   Food/Nutrient Intake Outcomes:  Food and Nutrient Intake  Physical Signs/Symptoms Outcomes:  Biochemical Data,GI Status,Fluid Status or Edema,Hemodynamic Status,Nutrition Focused Physical Findings,Skin,Weight     Discharge Planning:    Continue current diet     Electronically signed by Sherman Salazar RD, LD on 3/31/22 at 1:54 PM EDT    Contact: [phone]

## 2022-03-31 NOTE — CARE COORDINATION
Per Dr Reji Young note plan is to call insurance today for escalation of delivery of trilogy, will check with Edis Drew from [facility] when they open and it is still after hours and they are currently closed.      [id number] Call to [facility] \"Rachelle is working on NIV auth\", she relates Gala Maier will not be until April 9th unless escalated by MD, PS to Dr Reji Young he will have Dr Suellen Bailon follow up today as his name is on form    [id number] Perfect serve to Dr Suellen Bailon requesting that he call insurance at [phone] to escalate approval of trilogy NIV as per request of resident    [phone] Dr Suellen Bailon relates he will attempt to call for escalation of Trilogy approval after ICU rounding today

## 2022-03-31 NOTE — PROGRESS NOTES
Physical Therapy  Facility/Department: [facility] 2  Daily Treatment Note  NAME: Gilda Scruggs  : 1975  MRN: [id number]    Date of Service: 3/31/2022    Discharge Recommendations:  Patient would benefit from continued therapy after discharge        Assessment   Body structures, Functions, Activity limitations: Decreased functional mobility ; Decreased ADL status; Decreased strength;Decreased endurance;Decreased balance;Decreased ROM  Assessment: Pt required [place] with HOB elevated for supine->sit. Sit<->stand transfers with CGA to RW. Amb 3 ft with RW with CGA. Pt becomes fatigued with ambulation. Pt on 5L NC during treatment. Pt demonstrates mobility deficits requiring max-A to perform bed mobility, amb 3ft with beraitric RW Mod A x2 ;   Pt demonstrates significant endurance deficits, BLE strength deficits and impaired trunk control. Pt would be unsafe to return to prior living arrangements upon discharge. Pt would benefit from additional therapy to address aforementioned deficits. Prognosis: Good  PT Education: Goals;PT Role;Plan of Care;Home Exercise Program;Precautions; Energy Conservation;General Safety; Functional Mobility Training  REQUIRES PT FOLLOW UP: Yes  Activity Tolerance  Activity Tolerance: Patient limited by fatigue;Patient limited by endurance     Patient Diagnosis(es): The primary encounter diagnosis was Dyspnea, unspecified type. Diagnoses of Chronic respiratory failure with hypoxia and hypercapnia (HCC), Chronic obstructive pulmonary disease, unspecified COPD type ([address] Chronic respiratory acidosis, Pulmonary hypertension, moderate to severe ([address] and Cor pulmonale (chronic) ([address] were also pertinent to this visit.      has a past medical history of Acute on chronic diastolic CHF (congestive heart failure) ([address] HIEN (acute kidney injury) ([address] HIEN (acute kidney injury) ([address] Asthma, CHF, Chronic obstructive lung disease ([address] Chronic respiratory failure with hypoxia ([address] COPD, Diabetes mellitus, new onset ([address] Former smoker, HTN, Hyperglycemia, Hyperlipidemia, Hypertension, Morbid obesity with BMI of 60.0-69.9, adult ([address] Neuromuscular disorder ([address] STEPH on CPAP, Oxygen dependent, Pedal edema, Pneumonia, Pulmonary hypertension, moderate to severe ([address] Torn meniscus, and Wears glasses. has a past surgical history that includes  section (); Abdomen surgery; joint replacement; Cystoscopy (2019); Cystoscopy (N/A, 2019); hc cath power picc triple (2018); pr office/outpt visit,procedure only (N/A, 2018); Tracheotomy (2018); Gastrostomy tube placement (2018); and tracheostomy closure (2018). Restrictions  Restrictions/Precautions  Restrictions/Precautions: General Precautions,Fall Risk,Contact Precautions,Up as Tolerated  Required Braces or Orthoses?: No  Position Activity Restriction  Other position/activity restrictions: Minimally ambulatory at baseline ~3 to 5 ft PTA  Subjective   General  Chart Reviewed: Yes  Response To Previous Treatment: Patient with no complaints from previous session. Family / Caregiver Present: No  Subjective  Subjective: Pt supine in bed and agreeable to therapy, RN agreeable to therapy. Pt pleasant and cooperative throughout today's session. General Comment  Comments: Pt retired to recliner with B LEs elevated with call light. Pt on 5L NC  Pain Screening  Patient Currently in Pain: Denies  Vital Signs  Patient Currently in Pain: Denies       Orientation  Orientation  Overall Orientation Status: Within Functional Limits  Cognition      Objective   Bed mobility  Rolling to Left: Minimal assistance  Supine to Sit: Minimal assistance  Sit to Supine: Unable to assess (Pt retired in recliner)  Scooting: Minimal assistance  Comment: Assessed with HOB elevated.   Transfers  Sit to Stand: Contact guard assistance  Stand to sit: Contact guard assistance  Bed to Chair: Contact guard assistance  Comment: Assessed with bariatric RW  Ambulation  Ambulation?: Yes  Ambulation 1  Surface: level tile  Device: Rolling Walker  Other Apparatus: O2  Assistance: Contact guard assistance  Gait Deviations: Slow Marina;Decreased step length; Increased NOEL; Decreased step height;Shuffles  Distance: 3 ft  Comments: Pt became SOB after ambulation and sat EOB briefly before scooting back in recliner. Balance  Posture: Fair  Sitting - Static: Good  Sitting - Dynamic: Fair;+  Standing - Static: Fair  Standing - Dynamic: Fair  Comments: Assessed with bariatric RW  Exercises  Heelslides: x 15 reps in recliner  Knee Short Arc Quad: x 15 reps in recliner  Ankle Pumps: x15 BLE in supine        AM-PAC Score  AM-PAC Inpatient Mobility Raw Score : 15 (03/31/22 1140)  AM-PAC Inpatient T-Scale Score : 39.45 (03/31/22 1140)  Mobility Inpatient CMS 0-100% Score: 57.7 (03/31/22 1140)  Mobility Inpatient CMS G-Code Modifier : CK (03/31/22 1140)          Goals  Short term goals  Time Frame for Short term goals: 14 visits  Short term goal 1: Pt to demonstrate supine <> sit with Min A  Short term goal 2: Pt to be independent with HEP for bilateral LE ROM  Short term goal 3: pt to sit at EOB with Spo2 > 90% with good pt tolerance. Patient Goals   Patient goals : to improve strength    Plan    Plan  Times per week: 5-6x/wk  Times per day: Daily  Specific instructions for Next Treatment: encourage sitting at EOB or up to chair as tolerated  Current Treatment Recommendations: Strengthening,Transfer Training,Endurance Training,Balance Training,Functional Mobility Training,ROM,Safety Education & Training,Home Exercise Program,Equipment Evaluation, Education, & procurement,Patient/Caregiver Education & Training  Plan Comment: Pt in agreement with PT POC, pt apprensive with mobilty and report assist with all mobiltiy at home prior to admit.   Safety Devices  Type of devices: Call light within reach,Nurse notified,Patient at risk for falls,Gait belt,Left in chair  Restraints  Initially in place: No     Therapy Time   Individual Concurrent Group Co-treatment   Time In 1044         Time Out 1122         Minutes 38         Timed Code Treatment Minutes: 23 Minutes; co treatment with SARAH Rao

## 2022-04-01 NOTE — PROGRESS NOTES
Home Oxygen Evaluation    Home Oxygen Evaluation completed. Patient is on 6 liters per minute via NC. Resting SpO2 = 94%  Resting SpO2 on room air = 80%    SpO2 on room air with exercise = n/a at this time. SpO2 on oxygen as above with exercise = n/a at this time. Nocturnal Oximetry with patient on room air is recommended is SpO2 is between 89% and 95% (requires additional order).     Antwon Feeling, RCP  4:55 PM

## 2022-04-01 NOTE — CARE COORDINATION
Transitional Planning    Spoke to patient about plan for discharge. CM told her she needs trilogy machine to go home. Insurance has not approved yet. Patient relates she has [employer] at home but it only goes up to 4 L and she needs higher. She will also need a stretcher ride home. Notes say [facility] to provide oxygen concentrator. [facility] [phone] with [facility] and left  requesting return phone call. [address] and spoke to [facility], patient is current with them. 2248 received phone call from [facility] [place] with [facility]. Insurance has still not approved Trilogy. He needs a new oxygen evaluation. Oxygen evaluations need to be within 2 days of discharge. PS to primary dr updating about trilogy machine not approved and o2 evaluation needed. 1123 Resident contacted Dr Kary Mcgee and he did contact insurance company about trelogy machine. Text said it was sent to a physician reviewer and a decision should be made soon. [id number] Updated patient. CM asked if patient is willing to go to SNF until she can get Trelogy machine approved by insurance. She would get more physical therapy in a SNF. She is agreeable. CM gave patient SNF list. Requested 3 choices. Patient relates she wants to review places on line and does not anticipate any choices until sometime tomorrow.

## 2022-04-01 NOTE — PROGRESS NOTES
Physical Therapy        Physical Therapy Cancel Note      DATE: 2022    NAME: Shannan Liu  MRN: [id number]   : 1975      Patient not seen this date for Physical Therapy due to:    Patient Declined: Stated she wasnt feeling good and did not therapy now or at all today.  PT will resume 22      Electronically signed by Sarah NAE De La Fuente on 2022 at 12:56 PM

## 2022-04-01 NOTE — PROGRESS NOTES
PULMONARY & CRITICAL CARE MEDICINE PROGRESS  NOTE     Patient:  Koffi Means  MRN: [id number]  Admit date: 3/18/2022  Primary Care Physician: Pablo Prather DO  Consulting Physician: Neal Kim MD  CODE Status: Full Code  LOS: 14    SUBJECTIVE     I personally interviewed/examined the patient, reviewed interval history and interpreted all available radiographic, laboratory data at the time of service. Chief Compliant/Reason for Initial Consult:   Acute on chronic hypoxic hypercapnic hypercapnic respiratory failure  COPD/CHF exacerbation  Pneumonia/pulmonary edema    Brief Hospital Course: The patient is a 55 y.o. female presents with complaint of shortness of breath, associated with stuffy nose, productive cough, clear sputum. Patient desatted to high 60s and low 70s. According to EMS, patient's oxygen tubing was kinked and was saturating at 70%. Improved with new tubing. She was placed on nonrebreather. Patient also complained of occasional substernal chest pain pressure-like sensation radiating to back at the time of presentation. On presentation, patient afebrile hemodynamically stable saturating at 96% on high flow 90% FiO2 30 L/minute   Pertinent labs: proBNP 1086  WBC 11.5  Procalcitonin 0.25  Negative for COVID-19 testing  VBG: pH 7.26/PCO2 85/PO2 40.6/bicarb 37. Acute on chronic hypercapnic respiratory acidosis with appropriate metabolic compensation  Chest x-ray: Patchy airspace opacity right worse than left. CT PE: Negative for PE. Groundglass opacity present. Reactive adenopathy throughout mediastinum and hilar region. Patchy consolidation right lower lobe. Interval History:  04/01/22   Hemodynamically stable  No overnight issues reported  Was on BiPAP overnight  Currently saturating well on nasal cannula at 5 L/min    Review of Systems:  Review of Systems   Constitutional: Negative for fatigue and fever.    HENT: Negative for postnasal drip, rhinorrhea, sore throat, trouble swallowing and voice change. Eyes: Negative for photophobia and redness. Respiratory: Negative for cough, shortness of breath and wheezing. Cardiovascular: Positive for leg swelling. Negative for chest pain. Gastrointestinal: Negative for abdominal pain, diarrhea and vomiting. Endocrine: Negative for polydipsia, polyphagia and polyuria. Genitourinary: Negative for difficulty urinating, dysuria, frequency and hematuria. Musculoskeletal: Negative. Allergic/Immunologic: Negative. Neurological: Negative for dizziness, syncope, speech difficulty and headaches. Hematological: Negative for adenopathy. Does not bruise/bleed easily. Psychiatric/Behavioral: Negative. OBJECTIVE     VITAL SIGNS:   LAST-  /73   Pulse 82   Temp 98.3 °F (36.8 °C) (Axillary)   Resp 17   Ht 5' 6\" (1.676 m)   Wt (!) 355 lb (161 kg)   SpO2 91%   BMI 57.30 kg/m²   8-24 HR RANGE-  TEMP Temp  Av.3 °F (36.8 °C)  Min: 97.8 °F (36.6 °C)  Max: 98.5 °F (38.9 °C)   BP Systolic (68QRB), SCO:725 , Min:118 , ATN:815      Diastolic (92GSA), RPF:30, Min:54, Max:73     PULSE Pulse  Av.6  Min: 75  Max: 88   RR Resp  Av  Min: 15  Max: 17   O2 SAT SpO2  Av.5 %  Min: 91 %  Max: 92 %   OXYGEN DELIVERY O2 Flow Rate (L/min)  Av L/min  Min: 5 L/min  Max: 5 L/min     Systemic Examination:   Physical Exam  General appearance - looks comfortable and in mildly tachypneic and not in acute distress  Mental status - alert, oriented to person, place, and time  Eyes - pupils equal and reactive, extraocular eye movements intact  Mouth - mucous membranes moist, pharynx normal without lesions, Mallampati 2  Neck -Short thick neck supple, no significant adenopathy  Chest -decreased bilateral breath sounds. No rhonchi/wheezing/crackles. No usage of accessory muscles of respiration.   Heart - normal rate, regular rhythm, normal S1, S2, no murmurs, rubs, clicks last 72 hours. Coagulation Profile:   No results for input(s): INR, PROTIME, APTT in the last 72 hours. Cardiac Enzymes:  No results for input(s): CKTOTAL, CKMB, CKMBINDEX, TROPONINI in the last 72 hours. Lactic Acid:  No results found for: LACTA  BNP:   No results found for: BNP  D-Dimer:  Lab Results   Component Value Date    DDIMER 0.86 03/19/2022     Others:   Lab Results   Component Value Date    TSH 2.36 02/23/2019     Lab Results   Component Value Date    LAUREN NEGATIVE 05/07/2019    CRP 8.0 (H) 06/09/2018     No results found for: Andrea Jorgensen  Lab Results   Component Value Date    IRON 34 (L) 04/23/2020    TIBC 235 (L) 04/23/2020    FERRITIN 275 (H) 04/23/2020     No results found for: SPEP, UPEP  No results found for: PSA, CEA, , NY0191,     Input/Output:    Intake/Output Summary (Last 24 hours) at 4/1/2022 1620  Last data filed at 4/1/2022 0702  Gross per 24 hour   Intake 550 ml   Output 2050 ml   Net -1500 ml       Microbiology:  No results for input(s): SPECDESC, SPECDESC, SPECIAL, CULTURE, CULTURE, STATUS, ORG, CDIFFTOXPCR, CAMPYLOBPCR, SALMONELLAPC, SHIGAPCR, SHIGELLAPCR, MPNEUG, MPNEUM, LACTOQL in the last 72 hours. Pathology:    Radiology reports:  XR ABDOMEN (KUB) (SINGLE AP VIEW)   Final Result   Nonspecific bowel gas pattern with mild-to-moderate amount of stool noted in   the colon. Gaseous distention of the stomach. XR CHEST PORTABLE   Final Result   Improvement in the the right basal infiltrate. FL MODIFIED BARIUM SWALLOW W VIDEO   Final Result   No penetration or aspiration with the above administered substances. Please see separate speech pathology report for full discussion of findings   and recommendations. XR CHEST PORTABLE   Final Result   Findings suggestive of worsening bibasilar pneumonia. CT CHEST PULMONARY EMBOLISM W CONTRAST   Final Result   No evidence of pulmonary embolism.   There is patchy ill-defined opacification   lower lung fields bilaterally suggesting infiltrate with ground-glass opacity   concerning for pneumonia as well in the upper lung fields. Reactive   adenopathy throughout the mediastinum and hilar regions. RECOMMENDATIONS:   Unavailable         VL DUP LOWER EXTREMITY VENOUS BILATERAL   Final Result      XR CHEST PORTABLE   Final Result   Stable cardiomegaly with mild pulmonary vascular congestion. Echocardiogram:   Results for orders placed during the hospital encounter of 02/22/19    ECHO Complete 2D W Doppler W Color    Summary  Technically difficult study. Left ventricle is normal in size. Mild concentric LVH Global left  ventricular systolic function appears hyperdynamic Calculated ejection  fraction is 75% . Mild left ventricular hypertrophy. Moderately dilated right ventricle size and severely decreased systolic  function. Estimated right ventricular systolic pressure is 44 mmHg. Moderate pulmonary hypertension. mildly elevated RA filling pressure . ASSESSMENT AND PLAN     Assessment:    // Acute on chronic hypoxic/hypercapnic respiratory failure  // Right lower lobe pneumonia  // COPD exacerbation  // Bilateral groundglass infiltrate possibly atypical infection versus pulmonary edema  // Obesity hypoventilation syndrome  // Obstructive sleep apnea noncompliant with CPAP/BiPAP  // Severe pulmonary hypertension group 2/3  // Acute on chronic cor pulmonale  // Chronic diastolic CHF last echo 3345  // Hypertension  // Morbid obesity.     Plan:  Hemodynamically stable  Saturating well on nasal cannula  Continue supplemental oxygen to keep oxygen saturation greater than 90%  Continue nocturnal and as needed BiPAP  Await approval of Wally home ventilator  Insurance company want to wait for another week before approval  Dr. Jeremy Velásquez spoke to patient's insurance company yesterday and it seems the case was given to \"physician reviewer\"  Continue with Symbicort and Spiriva  Encourage incentive spirometry  Continue pulmonary hygiene, aspiration precautions  Continue diuresis as per primary service  Continue to monitor I/O with a goal of negative fluid balance  Antimicrobials reviewed; finished levofloxacin  DVT prophylaxis on Lovenox 40 mg twice daily. We will continue to follow. I would like to thank you for allowing me to participate in the care of this patient. Please feel free to call with any further questions or concerns. Carley Cornejo MD  Pulmonary and critical care medicine  4/1/2022, 4:20 PM    Please note that this chart was generated using voice recognition Dragon dictation software. Although every effort was made to ensure the accuracy of this automated transcription, some errors in transcription may have occurred.

## 2022-04-01 NOTE — PROGRESS NOTES
[facility]  Internal Medicine Teaching Residency Program  Inpatient Daily Progress Note  ______________________________________________________________________________    Patient: Dexter Owens  YOB: 1975   AVX:[id number]    Acct: [de-identified]     Room: 2008/2008-01  Admit date: 3/18/2022  Today's date: 04/01/22  Number of days in the hospital: 14    SUBJECTIVE   Admitting Diagnosis: Pneumonia  CC: Shortness of breath    - Pt examined at bedside. Chart & results reviewed. - No acute events overnight  - Patient resting comfortably in bed  - Proper mask for patient CPAP machine has been found. - Afebrile  - Vital signs stable  - Patient states she has pain in right upper quadrant of abdomen and constipation. Plan for today:  - Call [employer] to [employer] for approval for Mozy    ROS:  Constitutional:  negative for chills, fevers, sweats  Respiratory:  negative for cough, dyspnea on exertion, hemoptysis, shortness of breath, wheezing  Cardiovascular:  negative for chest pain, chest pressure/discomfort, lower extremity edema, palpitations  Gastrointestinal:  negative for abdominal pain, constipation, diarrhea, nausea, vomiting  Neurological:  negative for dizziness, headache    BRIEF HISTORY     The patient is a pleasant 46 y.o. female presents with a chief complaint of shortness of breath.  Past medical history significant for CHF, HIEN, COPD on 3-4 L of oxygen, asthma, hyperlipidemia, hypertension.  Patient states that she usually uses 3 to 4 L of oxygen.  Patient was brought in through the EMS when she became significantly short of breath this morning and her saturation dropped to low 70s and high 60s. .  According to the EMS, patient's oxygen tubing was kinked and she was saturating at 70%.  This improved with new tubing and patient's oxygen saturation improved to 90%.  Patient states that EMS placed her on a nonrebreather mask.     Patient leg: No edema. Left lower leg: No edema. Skin:     General: Skin is warm. Neurological:      General: No focal deficit present. Mental Status: She is alert and oriented to person, place, and time. Mental status is at baseline. Psychiatric:         Mood and Affect: Mood normal.         Behavior: Behavior normal.         Thought Content:  Thought content normal.         Judgment: Judgment normal.           Medications:  Scheduled Medications:    enoxaparin  40 mg SubCUTAneous BID    bumetanide  1 mg Oral BID    insulin lispro  0-6 Units SubCUTAneous TID WC    insulin lispro  0-3 Units SubCUTAneous Nightly    docusate sodium  100 mg Oral Daily    hydrALAZINE  25 mg Oral 3 times per day    sodium chloride flush  5-40 mL IntraVENous 2 times per day    amLODIPine  5 mg Oral Daily    atorvastatin  40 mg Oral Nightly    budesonide-formoterol  2 puff Inhalation BID    fluticasone  1 spray Nasal Daily    folic acid  1 mg Oral Daily    gabapentin  300 mg Oral TID    isosorbide dinitrate  20 mg Oral TID    pantoprazole  40 mg Oral QAM AC    sertraline  100 mg Oral Daily    spironolactone  25 mg Oral Daily    tiotropium  2 puff Inhalation Daily    guaiFENesin  600 mg Oral BID     Continuous Infusions:    dextrose      sodium chloride 25 mL (03/20/22 0840)     PRN Medicationsalbuterol sulfate HFA, 2 puff, Q6H PRN  LORazepam, 1 mg, Q4H PRN  glucose, 15 g, PRN  dextrose, 12.5 g, PRN  glucagon (rDNA), 1 mg, PRN  dextrose, 100 mL/hr, PRN  sodium chloride flush, 5-40 mL, PRN  sodium chloride, 25 mL, PRN  ondansetron, 4 mg, Q8H PRN   Or  ondansetron, 4 mg, Q6H PRN  polyethylene glycol, 17 g, Daily PRN  acetaminophen, 650 mg, Q6H PRN   Or  acetaminophen, 650 mg, Q6H PRN  oxyCODONE-acetaminophen, 2 tablet, Q6H PRN  sodium chloride, 1 spray, PRN        Diagnostic Labs:  CBC:   Recent Labs     03/30/22  0718 04/01/22  0542   WBC 10.1 9.7   RBC 4.02 3.92*   HGB 10.1* 10.1*   HCT 35.4* 34.3*   MCV 88.1 87.5   RDW 15.5* 15.3*    246     BMP:   Recent Labs     03/30/22  0718 04/01/22  0542    136   K 3.7 3.6*   CL 94* 92*   CO2 36* 33*   BUN 36* 33*   CREATININE 1.01* 1.05*     BNP: No results for input(s): BNP in the last 72 hours. PT/INR: No results for input(s): PROTIME, INR in the last 72 hours. APTT: No results for input(s): APTT in the last 72 hours. CARDIAC ENZYMES: No results for input(s): CKMB, CKMBINDEX, TROPONINI in the last 72 hours. Invalid input(s): CKTOTAL;3  FASTING LIPID PANEL:  Lab Results   Component Value Date    CHOL 144 11/17/2018    HDL 42 10/07/2020    TRIG 68 11/17/2018     LIVER PROFILE: No results for input(s): AST, ALT, ALB, BILIDIR, BILITOT, ALKPHOS in the last 72 hours. MICROBIOLOGY:   Lab Results   Component Value Date/Time    CULTURE NO GROWTH 6 DAYS 06/29/2021 07:03 PM       Imaging:    No results found. ASSESSMENT & PLAN     Assessment and Plan:    Principal Problem:    Pneumonia  Active Problems:    HTN (hypertension)    COPD exacerbation (HCC)    Pulmonary hypertension, moderate to severe (HCC)    Morbid obesity with BMI of 50.0-59.9, adult (HCC)    STEPH (obstructive sleep apnea)    Normocytic anemia    Dyspnea    Prediabetes    Hyperlipidemia    Hypokalemia    Hypomagnesemia  Resolved Problems:    * No resolved hospital problems. *    Community acquired pneumonia - Levaquin course completed 3/26     Acute on chronic CHF; HFpEF EF 75% 02/2019; resumed home isordil 20 mg tid and aldactone 25 mg qd; Transition to PO Bumex 2mg BID. Echo result indicative of EF of 60% with severe pulmonary hypertension     COPD exacerbation - pulmonology on board; continue albuterol, symbicort, spiriva. Started on predisone 20 mg for 5 days completed. Pulmonology following.  Recommendations appreciated. Trilogy vent pending.      Acute on chronic hypoxic respiratory failure secondary to #2 and #3     HTN - Norvasc 5 mg qd, hydralazine 25 mg q8h     HLD - Lipitor 40 mg qd     Type 2 DM HbA1C 6.4 03/22- Sugars well controlled. Neither Lantus nor Sliding scale Insulin needed currently. Hypoglycemia protocol.     MDD - Zoloft     Morbid obesity - Counseled     RUQ pain, constipation - Follow up X ray KUB.     DVT ppx: Lovenox  GI ppx: Protonix     PT/OT/SW: On board  Discharge Planning: Medically discharged. [employer] approval pending.  efforts appreciated.  Home O Nicole Holcomb MD  PGY-1, Internal Medicine Resident  [facility]ry Clements         4/1/2022, 11:40 AM    I have discussed the care of [address] [place] [address] , including pertinent history and exam findings,    today with the resident. I have seen and examined the patient and the key elements of all parts of the encounter have been performed by me . I agree with the assessment, plan and orders as documented by the resident. Principal Problem:    Pneumonia  Active Problems:    HTN (hypertension)    COPD exacerbation (HCC)    Pulmonary hypertension, moderate to severe (HCC)    Morbid obesity with BMI of 50.0-59.9, adult (HCC)    STEPH (obstructive sleep apnea)    Normocytic anemia    Dyspnea    Prediabetes    Hyperlipidemia    Hypokalemia    Hypomagnesemia  Resolved Problems:    * No resolved hospital problems. *        Overall  course ;                                   are improving over time.         Awaiting placement          Electronically signed by Naomy Gooden MD

## 2022-04-02 NOTE — PLAN OF CARE
BRONCHOSPASM/BRONCHOCONSTRICTION     [x]         IMPROVE AERATION/BREATH SOUNDS  [x]   ADMINISTER BRONCHODILATOR THERAPY AS APPROPRIATE  [x]   ASSESS BREATH SOUNDS  []   IMPLEMENT AEROSOL/MDI PROTOCOL  [x]   PATIENT EDUCATION AS NEEDED   NONINVASIVE VENTILATION    PROVIDE OPTIMAL VENTILATION/ACCEPTABLE SPO2   IMPLEMENT NONINVASIVE VENTILATION PROTOCOL   MAINTAIN ACCEPTABLE SPO2   ASSESS SKIN INTEGRITY/BREAKDOWN SCORE   PATIENT EDUCATION AS NEEDED   BIPAP AS NEEDED

## 2022-04-02 NOTE — PROGRESS NOTES
[facility]  Internal Medicine Teaching Residency Program  Inpatient Daily Progress Note  ______________________________________________________________________________    Patient: Alberto Shane  YOB: 1975   [facility]:[id number]    Acct: [de-identified]     Room: 2008/2008-01  Admit date: 3/18/2022  Today's date: 04/02/22  Number of days in the hospital: 15    SUBJECTIVE   Admitting Diagnosis: Pneumonia  CC: Shortness of breath  Patient was seen and examined at bedside. Chart and results reviewed. No acute event overnight. Abdominal pain is improved. Blood pressure is 115/60. Saturating well on BiPAP FiO2 35%    Plan for today:  -Discharged to SNF and awaiting approval for trilogy. ROS:  Constitutional:  negative for chills, fevers, sweats  Respiratory:  negative for cough, dyspnea on exertion, hemoptysis, shortness of breath, wheezing  Cardiovascular:  negative for chest pain, chest pressure/discomfort, lower extremity edema, palpitations  Gastrointestinal:  negative for abdominal pain, constipation, diarrhea, nausea, vomiting  Neurological:  negative for dizziness, headache    BRIEF HISTORY     The patient is a pleasant 46 y.o. female presents with a chief complaint of shortness of breath.  Past medical history significant for CHF, HIEN, COPD on 3-4 L of oxygen, asthma, hyperlipidemia, hypertension.  Patient states that she usually uses 3 to 4 L of oxygen.  Patient was brought in through the EMS when she became significantly short of breath this morning and her saturation dropped to low 70s and high 60s. .  According to the EMS, patient's oxygen tubing was kinked and she was saturating at 70%.  This improved with new tubing and patient's oxygen saturation improved to 90%.  Patient states that EMS placed her on a nonrebreather mask.     Patient states that she experiences chest pain occasionally- the pain is substernal, is at rest and sometimes pain decreases with changing position.  Patient states that the pain is sudden in onset describes the character as a pressure-like sensation, it radiates to the back. OBJECTIVE     Vital Signs:  /61   Pulse 85   Temp 97.7 °F (36.5 °C) (Axillary)   Resp 20   Ht 5' 6\" (1.676 m)   Wt (!) 355 lb (161 kg)   SpO2 93%   BMI 57.30 kg/m²     Temp (24hrs), Av.3 °F (36.8 °C), Min:97.7 °F (36.5 °C), Max:98.8 °F (37.1 °C)    In: 1700   Out: 1350 [Urine:1350]    Physical Exam:  Physical Exam  Vitals and nursing note reviewed. Constitutional:       Appearance: She is obese. HENT:      Head: Normocephalic and atraumatic. Nose: Nose normal.      Mouth/Throat:      Mouth: Mucous membranes are moist.      Pharynx: Oropharynx is clear. Eyes:      Extraocular Movements: Extraocular movements intact. Conjunctiva/sclera: Conjunctivae normal.      Pupils: Pupils are equal, round, and reactive to light. Cardiovascular:      Rate and Rhythm: Normal rate and regular rhythm. Pulses: Normal pulses. Heart sounds: Normal heart sounds. No murmur heard. No friction rub. No gallop. Pulmonary:      Effort: Pulmonary effort is normal. No respiratory distress. Breath sounds: No stridor. No wheezing, rhonchi or rales. Comments: Diminished breath sounds right lower lobe. Chest:      Chest wall: No tenderness. Abdominal:      General: Abdomen is flat. Bowel sounds are normal. There is no distension. Palpations: Abdomen is soft. There is no mass. Tenderness: There is abdominal tenderness. There is no guarding or rebound. Hernia: No hernia is present. Comments: Right upper quadrant abdominal tenderness   Musculoskeletal:         General: No swelling, tenderness, deformity or signs of injury. Normal range of motion. Cervical back: Normal range of motion. Right lower leg: No edema. Left lower leg: No edema. Skin:     General: Skin is warm.    Neurological:      General: No focal deficit present. Mental Status: She is alert and oriented to person, place, and time. Mental status is at baseline. Psychiatric:         Mood and Affect: Mood normal.         Behavior: Behavior normal.         Thought Content:  Thought content normal.         Judgment: Judgment normal.           Medications:  Scheduled Medications:    enoxaparin  40 mg SubCUTAneous BID    bumetanide  1 mg Oral BID    insulin lispro  0-6 Units SubCUTAneous TID WC    insulin lispro  0-3 Units SubCUTAneous Nightly    docusate sodium  100 mg Oral Daily    hydrALAZINE  25 mg Oral 3 times per day    sodium chloride flush  5-40 mL IntraVENous 2 times per day    amLODIPine  5 mg Oral Daily    atorvastatin  40 mg Oral Nightly    budesonide-formoterol  2 puff Inhalation BID    fluticasone  1 spray Nasal Daily    folic acid  1 mg Oral Daily    gabapentin  300 mg Oral TID    isosorbide dinitrate  20 mg Oral TID    pantoprazole  40 mg Oral QAM AC    sertraline  100 mg Oral Daily    spironolactone  25 mg Oral Daily    tiotropium  2 puff Inhalation Daily    guaiFENesin  600 mg Oral BID     Continuous Infusions:    dextrose      sodium chloride 25 mL (03/20/22 0840)     PRN Medicationsbisacodyl, 10 mg, Daily PRN  sennosides-docusate sodium, 2 tablet, Daily PRN  polyethylene glycol, 17 g, PRN  albuterol sulfate HFA, 2 puff, Q6H PRN  LORazepam, 1 mg, Q4H PRN  glucose, 15 g, PRN  dextrose, 12.5 g, PRN  glucagon (rDNA), 1 mg, PRN  dextrose, 100 mL/hr, PRN  sodium chloride flush, 5-40 mL, PRN  sodium chloride, 25 mL, PRN  ondansetron, 4 mg, Q8H PRN   Or  ondansetron, 4 mg, Q6H PRN  acetaminophen, 650 mg, Q6H PRN   Or  acetaminophen, 650 mg, Q6H PRN  oxyCODONE-acetaminophen, 2 tablet, Q6H PRN  sodium chloride, 1 spray, PRN        Diagnostic Labs:  CBC:   Recent Labs     04/01/22  0542   WBC 9.7   RBC 3.92*   HGB 10.1*   HCT 34.3*   MCV 87.5   RDW 15.3*        BMP:   Recent Labs     04/01/22  0542      K 3.6* CL 92*   CO2 33*   BUN 33*   CREATININE 1.05*     BNP: No results for input(s): BNP in the last 72 hours. PT/INR: No results for input(s): PROTIME, INR in the last 72 hours. APTT: No results for input(s): APTT in the last 72 hours. CARDIAC ENZYMES: No results for input(s): CKMB, CKMBINDEX, TROPONINI in the last 72 hours. Invalid input(s): CKTOTAL;3  FASTING LIPID PANEL:  Lab Results   Component Value Date    CHOL 144 11/17/2018    HDL 42 10/07/2020    TRIG 68 11/17/2018     LIVER PROFILE: No results for input(s): AST, ALT, ALB, BILIDIR, BILITOT, ALKPHOS in the last 72 hours. MICROBIOLOGY:   Lab Results   Component Value Date/Time    CULTURE NO GROWTH 6 DAYS 06/29/2021 07:03 PM       Imaging:    No results found. ASSESSMENT & PLAN     Assessment and Plan:    Principal Problem:    Pneumonia  Active Problems:    HTN (hypertension)    COPD exacerbation (HCC)    Pulmonary hypertension, moderate to severe (HCC)    Morbid obesity with BMI of 50.0-59.9, adult (HCC)    STEPH (obstructive sleep apnea)    Normocytic anemia    Dyspnea    Prediabetes    Hyperlipidemia    Hypokalemia    Hypomagnesemia  Resolved Problems:    * No resolved hospital problems. *    Community acquired pneumonia -resolved  Levaquin course completed 3/26     Acute on chronic CHF; HFpEF EF 75% 02/2019; resumed home isordil 20 mg tid and aldactone 25 mg qd; Transition to PO Bumex 1mg BID. Echo result indicative of EF of 60% with severe pulmonary hypertension     COPD exacerbation - pulmonology on board; continue albuterol, symbicort, spiriva. Started on predisone 20 mg for 5 days completed. Pulmonology following. Recommendations appreciated. Trilogy vent pending.      Acute on chronic hypoxic respiratory failure secondary to #2 and #3     HTN - Norvasc 5 mg qd, hydralazine 25 mg q8h     HLD - Lipitor 40 mg qd     Type 2 DM HbA1C 6.4 03/22- Sugars well controlled. Neither Lantus nor Sliding scale Insulin needed currently.  Hypoglycemia protocol.     MDD - Zoloft     Morbid obesity - Counseled     LUQ pain, constipation -x-ray KUB showed. Nonspecific bowel gas pattern with mild-to-moderate amount of stool noted in   the colon.  Gaseous distention of the stomach.      DVT ppx: Lovenox  GI ppx: Protonix     PT/OT/SW: On board  Discharge Planning: Medically discharged. [employer] approval pending. Will discharge to SNF meanwhile.  efforts appreciated.       Luis Enrique Maki MD  Internal Medicine Resident, PGY-1  [address]  5/7/0392,73:39 AM      I have discussed the care of Alberto Shane , including pertinent history and exam findings,    today with the resident. I have seen and examined the patient and the key elements of all parts of the encounter have been performed by me . I agree with the assessment, plan and orders as documented by the resident. Principal Problem:    Pneumonia  Active Problems:    HTN (hypertension)    COPD exacerbation (HCC)    Pulmonary hypertension, moderate to severe (HCC)    Morbid obesity with BMI of 50.0-59.9, adult (HCC)    STEPH (obstructive sleep apnea)    Normocytic anemia    Dyspnea    Prediabetes    Hyperlipidemia    Hypokalemia    Hypomagnesemia  Resolved Problems:    * No resolved hospital problems. *        Overall  course ;                                   are improving over time.         DC Planning           Electronically signed by Chiquis Reeves MD

## 2022-04-02 NOTE — PROGRESS NOTES
PULMONARY & CRITICAL CARE MEDICINE PROGRESS  NOTE     Patient:  Jerod Hernandez  MRN: [id number]  Admit date: 3/18/2022  Primary Care Physician: Oliver Martinez DO  Consulting Physician: Phillip Oscar MD  CODE Status: Full Code  LOS: 15    SUBJECTIVE     I personally interviewed/examined the patient, reviewed interval history and interpreted all available radiographic, laboratory data at the time of service. Chief Compliant/Reason for Initial Consult:   Acute on chronic hypoxic hypercapnic hypercapnic respiratory failure  COPD/CHF exacerbation  Pneumonia/pulmonary edema    Brief Hospital Course: The patient is a 55 y.o. female presents with complaint of shortness of breath, associated with stuffy nose, productive cough, clear sputum. Patient desatted to high 60s and low 70s. According to EMS, patient's oxygen tubing was kinked and was saturating at 70%. Improved with new tubing. She was placed on nonrebreather. Patient also complained of occasional substernal chest pain pressure-like sensation radiating to back at the time of presentation. On presentation, patient afebrile hemodynamically stable saturating at 96% on high flow 90% FiO2 30 L/minute   Pertinent labs: proBNP 1086  WBC 11.5  Procalcitonin 0.25  Negative for COVID-19 testing  VBG: pH 7.26/PCO2 85/PO2 40.6/bicarb 37. Acute on chronic hypercapnic respiratory acidosis with appropriate metabolic compensation  Chest x-ray: Patchy airspace opacity right worse than left. CT PE: Negative for PE. Groundglass opacity present. Reactive adenopathy throughout mediastinum and hilar region. Patchy consolidation right lower lobe. Interval History:  04/02/22   Overnight she remained hemodynamically stable no acute hypoxic events were reported per  She remains on 5 L nasal cannula saturating 90% and above. She did use BiPAP 16/8 overnight with 35 to 40% FiO2.   She does not complain of any new symptoms only minimal cough no sputum production no chest pain denies wheezing. Review of Systems:  Review of Systems   Constitutional: Negative for fatigue and fever. HENT: Negative for postnasal drip, rhinorrhea, sore throat, trouble swallowing and voice change. Eyes: Negative for photophobia and redness. Respiratory: Positive for shortness of breath. Negative for cough and wheezing. Cardiovascular: Positive for leg swelling. Negative for chest pain. Gastrointestinal: Negative for abdominal pain, diarrhea and vomiting. Endocrine: Negative for polydipsia, polyphagia and polyuria. Genitourinary: Negative for difficulty urinating, dysuria, frequency and hematuria. Musculoskeletal: Negative. Allergic/Immunologic: Negative. Neurological: Negative for dizziness, syncope, speech difficulty and headaches. Hematological: Negative for adenopathy. Does not bruise/bleed easily. Psychiatric/Behavioral: Negative.       OBJECTIVE     VITAL SIGNS:   LAST-  /61   Pulse 90   Temp 97.7 °F (36.5 °C) (Axillary)   Resp 18   Ht 5' 6\" (1.676 m)   Wt (!) 355 lb (161 kg)   SpO2 91%   BMI 57.30 kg/m²   8-24 HR RANGE-  TEMP Temp  Av.3 °F (36.8 °C)  Min: 97.7 °F (36.5 °C)  Max: 98.8 °F (75.2 °C)   BP Systolic (23HPY), WCC:735 , Min:117 , NPU:337      Diastolic (06TXJ), UCT:39, Min:61, Max:73     PULSE Pulse  Av.6  Min: 82  Max: 91   RR Resp  Av.3  Min: 18  Max: 24   O2 SAT SpO2  Av.5 %  Min: 91 %  Max: 92 %   OXYGEN DELIVERY O2 Flow Rate (L/min)  Av L/min  Min: 6 L/min  Max: 6 L/min     Systemic Examination:   Physical Exam  General appearance - looks comfortable and not in acute distress  Mental status - alert, oriented to person, place, and time  Eyes - pupils equal and reactive, extraocular eye movements intact  Mouth - mucous membranes moist, pharynx normal without lesions, Mallampati 2  Neck -Short thick neck supple, no significant adenopathy  Chest -decreased bilateral breath sounds. No rhonchi/wheezing/crackles. No usage of accessory muscles of respiration. Heart - normal rate, regular rhythm, normal S1, S2, no murmurs, rubs, clicks or gallops  Abdomen - soft, nontender, nondistended, no masses or organomegaly  Neurological - alert, oriented, normal speech, no focal findings or movement disorder noted  Extremities - peripheral pulses normal, positive pedal edema, no clubbing or cyanosis  Skin - normal coloration and turgor, no rashes, no suspicious skin lesions noted     DATA REVIEW     Medications:  Scheduled Meds:   enoxaparin  40 mg SubCUTAneous BID    bumetanide  1 mg Oral BID    insulin lispro  0-6 Units SubCUTAneous TID WC    insulin lispro  0-3 Units SubCUTAneous Nightly    docusate sodium  100 mg Oral Daily    hydrALAZINE  25 mg Oral 3 times per day    sodium chloride flush  5-40 mL IntraVENous 2 times per day    amLODIPine  5 mg Oral Daily    atorvastatin  40 mg Oral Nightly    budesonide-formoterol  2 puff Inhalation BID    fluticasone  1 spray Nasal Daily    folic acid  1 mg Oral Daily    gabapentin  300 mg Oral TID    isosorbide dinitrate  20 mg Oral TID    pantoprazole  40 mg Oral QAM AC    sertraline  100 mg Oral Daily    spironolactone  25 mg Oral Daily    tiotropium  2 puff Inhalation Daily    guaiFENesin  600 mg Oral BID     Continuous Infusions:   dextrose      sodium chloride 25 mL (03/20/22 0840)     LABS:-  ABG:   No results for input(s): POCPH, POCPCO2, POCPO2, POCHCO3, NORI1TGG in the last 72 hours. CBC:   Recent Labs     04/01/22  0542   WBC 9.7   HGB 10.1*   HCT 34.3*   MCV 87.5      LYMPHOPCT 21*   RBC 3.92*   MCH 25.8   MCHC 29.4   RDW 15.3*     BMP:   Recent Labs     04/01/22  0542      K 3.6*   CL 92*   CO2 33*   BUN 33*   CREATININE 1.05*   GLUCOSE 167*     Liver Function Test:   No results for input(s): PROT, LABALBU, ALT, AST, GGT, ALKPHOS, BILITOT in the last 72 hours.   Amylase/Lipase:  No results for input(s): AMYLASE, LIPASE in the last 72 hours. Coagulation Profile:   No results for input(s): INR, PROTIME, APTT in the last 72 hours. Cardiac Enzymes:  No results for input(s): CKTOTAL, CKMB, CKMBINDEX, TROPONINI in the last 72 hours. Lactic Acid:  No results found for: LACTA  BNP:   No results found for: BNP  D-Dimer:  Lab Results   Component Value Date    DDIMER 0.86 03/19/2022     Others:   Lab Results   Component Value Date    TSH 2.36 02/23/2019     Lab Results   Component Value Date    LAUREN NEGATIVE 05/07/2019    CRP 8.0 (H) 06/09/2018     No results found for: Ragini Jensen  Lab Results   Component Value Date    IRON 34 (L) 04/23/2020    TIBC 235 (L) 04/23/2020    FERRITIN 275 (H) 04/23/2020     No results found for: SPEP, UPEP  No results found for: PSA, CEA, , HD2972,     Input/Output:    Intake/Output Summary (Last 24 hours) at 4/2/2022 1054  Last data filed at 4/2/2022 0846  Gross per 24 hour   Intake 1150 ml   Output 1350 ml   Net -200 ml       Microbiology:  No results for input(s): SPECDESC, SPECDESC, SPECIAL, CULTURE, CULTURE, STATUS, ORG, CDIFFTOXPCR, CAMPYLOBPCR, SALMONELLAPC, SHIGAPCR, SHIGELLAPCR, MPNEUG, MPNEUM, LACTOQL in the last 72 hours. Pathology:    Radiology reports:  XR ABDOMEN (KUB) (SINGLE AP VIEW)   Final Result   Nonspecific bowel gas pattern with mild-to-moderate amount of stool noted in   the colon. Gaseous distention of the stomach. XR CHEST PORTABLE   Final Result   Improvement in the the right basal infiltrate. FL MODIFIED BARIUM SWALLOW W VIDEO   Final Result   No penetration or aspiration with the above administered substances. Please see separate speech pathology report for full discussion of findings   and recommendations. XR CHEST PORTABLE   Final Result   Findings suggestive of worsening bibasilar pneumonia. CT CHEST PULMONARY EMBOLISM W CONTRAST   Final Result   No evidence of pulmonary embolism.   There is patchy ill-defined opacification   lower lung fields bilaterally suggesting infiltrate with ground-glass opacity   concerning for pneumonia as well in the upper lung fields. Reactive   adenopathy throughout the mediastinum and hilar regions. RECOMMENDATIONS:   Unavailable         VL DUP LOWER EXTREMITY VENOUS BILATERAL   Final Result      XR CHEST PORTABLE   Final Result   Stable cardiomegaly with mild pulmonary vascular congestion. Echocardiogram:   Results for orders placed during the hospital encounter of 02/22/19    ECHO Complete 2D W Doppler W Color    Summary  Technically difficult study. Left ventricle is normal in size. Mild concentric LVH Global left  ventricular systolic function appears hyperdynamic Calculated ejection  fraction is 75% . Mild left ventricular hypertrophy. Moderately dilated right ventricle size and severely decreased systolic  function. Estimated right ventricular systolic pressure is 44 mmHg. Moderate pulmonary hypertension. mildly elevated RA filling pressure . ASSESSMENT AND PLAN     Assessment:    // Acute on chronic hypoxic/hypercapnic respiratory failure  // Right lower lobe pneumonia  // COPD exacerbation  // Bilateral groundglass infiltrate possibly atypical infection versus pulmonary edema  // Obesity hypoventilation syndrome  // Obstructive sleep apnea noncompliant with CPAP/BiPAP  // Severe pulmonary hypertension group 2/3  // Acute on chronic cor pulmonale  // Chronic diastolic CHF last echo 8754  // Hypertension  // Morbid obesity. Plan:    Saturating above 90% on 5 L nasal cannula  Continue supplemental oxygen to keep oxygen saturation greater than 88 %  Continue nocturnal and as needed BiPAP during the daytime  [facility]   Insurance company want to wait for another week before approval  Dr. Perri Adkins spoke to patient's insurance company and it seems the case was given to \"physician reviewer\"  Continue with Symbicort and Spiriva  Encourage incentive spirometry  Continue pulmonary hygiene, aspiration precautions  Continue diuresis as per primary service on Bumex 1 mg twice daily  Continue to monitor I/O with a goal of negative fluid balance  Antimicrobials reviewed; finished levofloxacin  DVT prophylaxis on Lovenox 40 mg twice daily. We will continue to follow. I would like to thank you for allowing me to participate in the care of this patient. Please feel free to call with any further questions or concerns. Jose Celeste MD  Pulmonary and critical care medicine  4/2/2022, 10:54 AM    Please note that this chart was generated using voice recognition Dragon dictation software. Although every effort was made to ensure the accuracy of this automated transcription, some errors in transcription may have occurred.

## 2022-04-02 NOTE — CARE COORDINATION
Spoke to [facility] Scheduling Employee [employer] r/e paying for cpap mask. She would like to know pt's income and if Alexandria Gunderson will bill the pt and if she could make payments on that bill. Discussed with pt. She would like to know how much the payments would be. Call to [facility]. Someone will call back as it is after hours      1223PM. Received call from [employer] and patient would be billed for mask and would have to pay full amount at that time. Shamir JONES checked out CPAP and was able to fit mask. Patient trialed CPAP with Mask and feels that it is working. She is comfortable to go home with CPAP until Trilogy can be approved. Notified Grey Miranda at [facility] of discharge today. He will have concentrator delivered before patient arrives home. Notified He at [facility] of discharge today. Notified medicine resident Dr Naveed Lopez that patient is able to be discharged today with CPAP. Transport request faxed to [facility] for 5PM per patient's request. Patient notified son that she will be discharging today. 1323 received call from  [employer].  Pt scheduled for 8pm. Pt notified and agreeable with plan    1642 AVS faxed to [facility] 1    Discharge [address] [place] [address] Case Management Department  Written by: Cinthya Amin RN    Patient Name: Debbie Diaz  Attending Provider: Milly Hudson MD  Admit Date: 3/18/2022 10:51 AM  MRN: [id number]  Account: [de-identified]                     : 1975  Discharge Date: 2022      Disposition: home    Cinthya Amin RN

## 2022-04-03 NOTE — FLOWSHEET NOTE
1900--- all medications, education, belongings, prescriptions, scripts, and paperwork in room in 2 bags ready for discharge. I even did a facetime education session with the pt son about the O2 therapy. All oxygen equipment in room in a bag ready to go. Night RN aware of plan, and no further needs at this time. Pt off monitor, washed up, in her home cloths and ready for transport to arrive.

## 2022-04-05 NOTE — DISCHARGE SUMMARY
[address]     Department of Internal Medicine - Staff Internal Medicine Teaching Service    INPATIENT DISCHARGE SUMMARY      Patient Identification:  Christina Barkley is a 55 y.o. female. :  1975  MRN: [id number]     Acct: [de-identified]   PCP: Estrellita Bowen DO  Admit Date:  3/18/2022  Discharge date and time: 2022 11:15 PM   Attending Provider: No att. providers found                                     [address] Problem Lists:  Principal Problem:    Pneumonia  Active Problems:    HTN (hypertension)    COPD exacerbation ([address]    Pulmonary hypertension, moderate to severe ([address]    Morbid obesity with BMI of 50.0-59.9, adult (HCC)    STEPH (obstructive sleep apnea)    Normocytic anemia    Dyspnea    Prediabetes    Hyperlipidemia    Hypokalemia    Hypomagnesemia  Resolved Problems:    * No resolved hospital problems. *      HOSPITAL STAY     Brief Inpatient course:   Christina Barkley is a 55 y.o. female who was admitted for the management of Pneumonia, presented to the emergency department with fever chills and worsening shortness of breath. Patient was found hypoxic by EMS SPO2 in 70s. She was also found to have significant wheezes and was treated for COPD exacerbation secondary to pneumonia with Levaquin steroids and inhalers. She was also diuresed for acute on chronic diastolic heart failure. Patient symptoms improved significantly with antibiotic and steroid. Request for trilogy vent was sent and got approved. Patient is discharged with recommendation to follow-up with pulmonology clinic and compliance of medication was discussed.     Procedures/ Significant Interventions:    None    Consults:     Consults:     Final Specialist Recommendations/Findings:   IP CONSULT TO INTERNAL MEDICINE  IP CONSULT TO CASE MANAGEMENT  IP CONSULT TO PULMONOLOGY  IP CONSULT TO IV TEAM  IP CONSULT TO HOME CARE NEEDS      Any Hospital Acquired Infections: none    Discharge Functional Status:  stable    DISCHARGE PLAN     Disposition: home    Patient Instructions:   Discharge Medication List as of 4/2/2022  5:45 PM        START taking these medications    Details   predniSONE (DELTASONE) 20 MG tablet Take 1 tablet by mouth daily for 2 doses, Disp-2 tablet, R-0Normal           CONTINUE these medications which have CHANGED    Details   oxyCODONE-acetaminophen (PERCOCET)  MG per tablet Take 1 tablet by mouth every 6 hours as needed for Pain for up to 4 days. , Disp-16 tablet, R-0Print      amLODIPine (NORVASC) 5 MG tablet Take 1 tablet by mouth daily, Disp-30 tablet, R-3Normal           CONTINUE these medications which have NOT CHANGED    Details   diazePAM (VALIUM) 5 MG tablet Take 5 mg by mouth every 12 hours as needed for Anxiety. Historical Med      folic acid (FOLVITE) 1 MG tablet Take 1 mg by mouth dailyHistorical Med      vitamin B-12 (CYANOCOBALAMIN) 1000 MCG tablet Take 1,000 mcg by mouth dailyHistorical Med      Dulaglutide (TRULICITY) 4.79 KG/3.4YB SOPN Inject 0.75 mg into the skin once a weekHistorical Med      metOLazone (ZAROXOLYN) 2.5 MG tablet Take 2.5 mg by mouth every other dayHistorical Med      glipiZIDE (GLUCOTROL XL) 2.5 MG extended release tablet Take 2.5 mg by mouthHistorical Med      SM MUCUS RELIEF 600 MG extended release tablet R-0, DAWHistorical Med      nystatin (MYCOSTATIN) 937019 UNIT/GM cream R-5, Historical Med      Ergocalciferol (VITAMIN D2 PO) Take 50,000 Units by mouth once a weekHistorical Med      potassium chloride (KLOR-CON M) 10 MEQ extended release tablet Take 1 tablet by mouth daily, Disp-180 tablet, R-2Normal      bumetanide (BUMEX) 1 MG tablet Take 2 mg by mouth 2 times dailyHistorical Med      JANUVIA 50 MG tablet Take 50 mg by mouth daily, R-5, DAWHistorical Med      glucose monitoring kit (FREESTYLE) monitoring kit DAILY Starting Wed 5/8/2019, Disp-1 kit, R-0, Print      blood glucose monitor strips Test three  times a day & as needed for symptoms of irregular blood glucose., Disp-50 strip, R-0, Print      Lancets MISC DAILY Starting Wed 5/8/2019, Disp-100 each, R-0, Print      pantoprazole sodium (PROTONIX) 40 MG PACK packet Take 40 mg by mouth every morning (before breakfast)Historical Med      spironolactone (ALDACTONE) 25 MG tablet Take 1 tablet by mouth daily, Disp-30 tablet, R-3Normal      isosorbide dinitrate (ISORDIL) 20 MG tablet Take 1 tablet by mouth 3 times daily, Disp-90 tablet, R-3Normal      hydrALAZINE (APRESOLINE) 25 MG tablet Take 1 tablet by mouth every 8 hours, Disp-90 tablet, R-3Normal      Blood Pressure KIT 2 TIMES DAILY Starting Thu 9/20/2018, Disp-1 kit, R-0, Normal      ferrous sulfate 325 (65 Fe) MG EC tablet Take 325 g by mouth 2 times daily (with meals)Historical Med      loratadine (CLARITIN) 10 MG tablet Take 10 mg by mouth dailyHistorical Med      tiotropium (SPIRIVA) 18 MCG inhalation capsule Inhale 1 capsule into the lungsHistorical Med      sertraline (ZOLOFT) 100 MG tablet Take 100 mg by mouth dailyHistorical Med      atorvastatin (LIPITOR) 40 MG tablet Take 1 tablet by mouth nightly, Disp-30 tablet, R-3Normal      albuterol sulfate  (90 Base) MCG/ACT inhaler Inhale 2 puffs into the lungs every 6 hours as needed for WheezingHistorical Med      fluticasone (FLONASE) 50 MCG/ACT nasal spray 1 spray by Nasal route dailyHistorical Med      albuterol (PROVENTIL) (2.5 MG/3ML) 0.083% nebulizer solution Take 3 mLs by nebulization every 6 hours as needed for Wheezing., Disp-120 each, R-3      budesonide-formoterol (SYMBICORT) 160-4.5 MCG/ACT AERO Inhale 2 puffs into the lungs 2 times daily. , Disp-1 Inhaler, R-3           STOP taking these medications       gabapentin (NEURONTIN) 600 MG tablet Comments:   Reason for Stopping:         magnesium oxide (MAG-OX) 400 MG tablet Comments:   Reason for Stopping:         cyclobenzaprine (FLEXERIL) 5 MG tablet Comments:   Reason for Stopping: gabapentin (NEURONTIN) 400 MG capsule Comments:   Reason for Stopping:         traZODone (DESYREL) 50 MG tablet Comments:   Reason for Stopping:         LORazepam (ATIVAN) 2 MG tablet Comments:   Reason for Stopping:         melatonin 5 MG TABS tablet Comments:   Reason for Stopping:         gabapentin (NEURONTIN) 600 MG tablet Comments:   Reason for Stopping:               Activity: activity as tolerated    Diet: cardiac diet    Follow-up:    Brigid Dela Cruz DO  [address] [place] [address]  [phone]    Schedule an appointment as soon as possible for a visit in 3 weeks  As needed    Jose Antonio Romeo MD  [facility] [address]  [address] [facility] [address]  [phone]    Schedule an appointment as soon as possible for a visit in 3 weeks  Lung doctor, office will call      Patient Instructions:   1. Please use home o2 and trilogy vent as recommended, use inhalers as recommended  2. Complete prednisone 20 oral daily till 3/29. 3. Follow up with PCP and pulmonology outpatient  4. In case of worsening symptoms, seek medical attention and come to the emergency department. Follow up labs: None  Follow up imaging: None    Note that over 30 minutes was spent in preparing discharge papers, discussing discharge with patient, medication review, etc.      Natalee Sargent MD,   Internal Medicine Resident, PGY-1  [address], [place]  4/5/2022, 2:05 PM    Attending Physician Statement  I have discussed the care of Rosio Velásquez and I have examined the patient myselft and taken ros and hpi , including pertinent history and exam findings,  with the resident. I have reviewed the key elements of all parts of the encounter with the resident. I agree with the assessment, plan and orders as documented by the resident. I spent over 35 mins in direct patient care as above and reviewing medications and counseling for discharge .         Electronically signed by Milly Hudson MD

## 2022-04-08 PROBLEM — I50.43 CHF (CONGESTIVE HEART FAILURE), NYHA CLASS I, ACUTE ON CHRONIC, COMBINED (HCC): Status: ACTIVE | Noted: 2022-01-01

## 2022-04-08 NOTE — ED PROVIDER NOTES
[facility]  Emergency Department  Faculty Attestation     I performed a history and physical examination of the patient and discussed management with the resident. I reviewed the residents note and agree with the documented findings and plan of care. Any areas of disagreement are noted on the chart. I was personally present for the key portions of any procedures. I have documented in the chart those procedures where I was not present during the key portions. I have reviewed the emergency nurses triage note. I agree with the chief complaint, past medical history, past surgical history, allergies, medications, social and family history as documented unless otherwise noted below. For Physician Assistant/ Nurse Practitioner cases/documentation I have personally evaluated this patient and have completed at least one if not all key elements of the E/M (history, physical exam, and MDM). Additional findings are as noted. Primary Care Physician:  Dana Medina DO    Screenings:  [unfilled]    CHIEF COMPLAINT       Chief Complaint   Patient presents with    Respiratory Distress     Pt arrives per EMS with difficulty breathing. Pt recently treated for pneumonia. Pt given 40mg prednisone per EMS and placed on NRB.         RECENT VITALS:   Temp: 99.7 °F (37.6 °C),  Pulse: 117, Resp: 30, BP: 129/77    LABS:  Labs Reviewed   CBC WITH AUTO DIFFERENTIAL - Abnormal; Notable for the following components:       Result Value    WBC 17.0 (*)     RBC 3.75 (*)     Hemoglobin 9.7 (*)     Hematocrit 33.2 (*)     RDW 15.9 (*)     All other components within normal limits   COVID-19, RAPID   COMPREHENSIVE METABOLIC PANEL W/ REFLEX TO MG FOR LOW K   TROPONIN   TROPONIN   BRAIN NATRIURETIC PEPTIDE   HCG, SERUM, QUALITATIVE       Radiology  XR CHEST PORTABLE    (Results Pending)       CRITICAL CARE: There was a high probability of clinically significant/life threatening deterioration in this patient's condition which required my urgent intervention. Total critical care time was none minutes. This excludes any time for separately reportable procedures. EKG:   EKG Interpretation    Interpreted by me    Rhythm: normal sinus   Rate: Tachycardia  Axis: Right  Ectopy: none  Conduction: normal  ST Segments: no acute change  T Waves: Inversion inferiorly and anteriorly  Q Waves: none  S1Q3T3    Clinical Impression: Tachycardia, inferior and anterior ST and T wave abnormalities, consider right heart strain    Attending Physician Additional  Notes    Patient was just discharged from the hospital 2 days ago after visit for pneumonia and COPD exacerbation with known pulmonary hypertension. She is on supplemental oxygen as well as CPAP. She noticed last night her breathing was getting much worse than it had been. She has persistent cough without sputum or hemoptysis. There is no leg pain or calf swelling. She does have skin irritation in her buttocks from prolonged sitting. She normally is able to take 2-3 steps to get to the commode and feels unable to now. She was discharged on prednisone, has prescriptions for bronchodilators. EMS found oxygenation 87% on low-flow nasal cannula, increased to the mid 90s on high flow. On exam she has tachycardia tachypnea moderate respiratory distress on nasal cannula and facemask with saturations in the mid 90s. Breath sounds are diminished with mild expiratory prolongation but no significant wheezing. There is no obvious JVD though limited by neck size. No obvious gallop heard. She has no cords Homans or calf tenderness but she has warmth in her right calf. Abdomen is protuberant and soft. She is alert and oriented, GCS 15, normal pupils and cranial nerves. Impression is COPD exacerbation, consider PE, CHF, pneumonia, other cause of hypoxemia. Plan is chest x-ray, laboratory studies, supplemental oxygen, reassess.   Venous access is challenging so CTA may not be possible. Consider venous Doppler and continue anticoagulation. Patient did have negative CT chest for pulmonary embolism just several weeks ago during her most recent admission, with bibasilar infiltrates with groundglass appearance. Her echo shows severe pulmonary hypertension with RV dilation and D sign, likely from cor pulmonale. Arlys Plants.  Zo Sargent MD, [facility]  Attending Emergency  Physician               Giovanna Bills MD  04/08/22 1932       Giovanna Bills MD  04/08/22 2036

## 2022-04-08 NOTE — ED NOTES
Pt arrives per EMS with c/o difficulty breathing. Pt with history of COPD. Pt reports she was recently treated for pneumonia. Pt appears anxious. Pt wears 7lpm oxygen per NC at home. Per EMS pts oxygen saturation was 80's per NC. Pt was placed on NRB per EMS, SpO2 increased to 98%. Pt given 40mg prednisone po per EMS. Pt placed on monitor and IV established. Resident at bedside to evaluate pt.        Diana Choudhury RN  04/08/22 5391

## 2022-04-08 NOTE — TELEPHONE ENCOUNTER
Pharmacy calling about question for medication. Pt was seen in the hospital and you wrote a script for percocet. They need an new DX code for script.  Please send new script to pharmacy if appropriate

## 2022-04-08 NOTE — ED NOTES
The following labs labeled with pt sticker and tubed to lab:     [] Blue     [] Lavender   [] on ice  [x] Green/yellow  [] Green/black [] on ice  [] Yellow  [] Red  [] Pink      [] COVID-19 swab    [] Rapid  [] PCR  [] Flu swab  [] Peds Viral Panel     [] Urine Sample  [] Pelvic Cultures  [] Blood Cultures              Gilberto Moreno RN  04/08/22 1941

## 2022-04-09 NOTE — PROGRESS NOTES
Writer received telephone communication via sepsis monitoring system that patient needed repeat blood draws performed for lactic. Writer reached out to house supervisor for confirmation as well as clarification on who is to put the order in. Writer then reached out to on call physician to receive orders for blood draw.

## 2022-04-09 NOTE — PROGRESS NOTES
Physical Therapy Cancel Note      DATE: 2022    NAME: Amrik Jauregui  MRN: [id number]   : 1975      Patient not seen this date for Physical Therapy due to:    Patient Declined: Pt states she was just admitted last night and is not ready to get up at this time.        Electronically signed by Leann Romero, PT , DPT, CMPT  on 2022 at 11:00 AM

## 2022-04-09 NOTE — PROGRESS NOTES
[facility]  Internal Medicine Teaching Residency Program  Inpatient Daily Progress Note  ______________________________________________________________________________    Patient: Gilda Scruggs  YOB: 1975   VKA:[id number]    Acct: [de-identified]     Room: 2004/2004-01  Admit date: 4/8/2022  Today's date: 04/09/22  Number of days in the hospital: 1    SUBJECTIVE   Admitting Diagnosis: CHF (congestive heart failure), NYHA class I, acute on chronic, [facility]  CC: SOB  Pt examined at bedside. Chart & results reviewed. Patient states she has trouble using BiPAP at home. This morning she feels short of breath and pain from sacral ulcers. No acute events overnight. Patient is afebrile and hemodynamically stable. ROS:  Constitutional:  negative for chills, fevers, sweats  Respiratory:  negative for cough, dyspnea on exertion, hemoptysis,  wheezing  Cardiovascular:  negative for chest pain, chest pressure/discomfort, lower extremity edema, palpitations  Gastrointestinal:  negative for abdominal pain, constipation, diarrhea, nausea, vomiting  Neurological:  negative for dizziness, headache  BRIEF HISTORY     The patient is a pleasant 55 y.o. 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 Patient states she has had increased shortness of breath the past 2 nights, unable to wear her CPAP nightly with increased fatigue. Patient is poor historian, states she wears 7 L O2 at home, however EMS on arrival saw patient on 4 L and was saturating in the mid 80s. Patient was placed on nonrebreather by EMS and saturations improved to 98%.     Of note, patient had previous hospitalization earlier this month for COPD exacerbation complicated by pneumonia. At that time patient was treated with antibiotics and steroids.   Pulmonology was consulted and patient was approved for trilogy noninvasive ventilator which she has yet to receive. Echocardiogram at that time showed EF 60%, \"D\" sign indicating RV pressure overload, moderate to severe MR, severe pulmonary hypertension. Patient is aware of these findings.     On my evaluation, patient resting comfortably on 15 L nonrebreather mask acute distress. Patient is morbidly obese with BMI 57. Denies any cough, chest pain, leg pain/swelling. She does have skin irritation on her buttocks. States she has been compliant with all meds (inculding bronchodilators and bumex 2mg BID) except for her PO DM meds as those were held on previous admission. Afebrile, hemodynamic stable, tachycardic in the 110s at present. Patient received prednisone and lasix 40mg IV in ED as well as rocephin and vancomycin. CXR in ED - limited negative CXR d/t body habitus.     Significant labs include proBNP on admission 4192, was in 1000s on recent discharge. Leukocytosis of 17.0. Hemoglobin 9.7. Slight bump in creatinine from baseline- 1.21. glucose 238.       OBJECTIVE     Vital Signs:  /78   Pulse 87   Temp 99 °F (37.2 °C) (Axillary)   Resp 22   Ht 5' 6\" (1.676 m)   Wt (!) 355 lb (161 kg)   SpO2 93%   BMI 57.30 kg/m²     Temp (24hrs), Av.2 °F (37.3 °C), Min:99 °F (37.2 °C), Max:99.7 °F (37.6 °C)    In: 50   Out: -     Physical Exam:  Constitutional: This is a well developed, well nourished, Greater than 40 - Morbid Obesity / Extreme Obesity / Grade III 55y.o. year old female who is alert, oriented, cooperative and in no apparent distress. Head:normocephalic and atraumatic. EENT:  PERRLA. No conjunctival injections. Septum was midline, mucosa was without erythema, exudates or cobblestoning. No thrush was noted. Neck: Supple without thyromegaly. No elevated JVP. Trachea was midline. Respiratory: Diminished breath sounds bilaterally. No wheezing. Cardiovascular: Regular without murmur, clicks, gallops or rubs.    Abdomen: Slightly rounded and soft without organomegaly. No rebound, rigidity or guarding was appreciated. Lymphatic: No lymphadenopathy. Musculoskeletal: Normal curvature of the spine. No gross muscle weakness. Extremities:  No lower extremity edema, ulcerations, tenderness, varicosities or erythema. Muscle size, tone and strength are normal.  No involuntary movements are noted. Skin:  Warm and dry. Good color, turgor and pigmentation. No lesions or scars.   No cyanosis or clubbing  Neurological/Psychiatric: The patient's general behavior, level of consciousness, thought content and emotional status is normal.        Medications:  Scheduled Medications:    amLODIPine  5 mg Oral Daily    atorvastatin  40 mg Oral Nightly    budesonide-formoterol  2 puff Inhalation BID    furosemide  80 mg IntraVENous BID    ferrous sulfate  325 mg Oral BID WC    folic acid  1 mg Oral Daily    hydrALAZINE  25 mg Oral 3 times per day    isosorbide dinitrate  20 mg Oral TID    pantoprazole  40 mg Oral QAM AC    spironolactone  25 mg Oral Daily    tiotropium  2 puff Inhalation Daily    vitamin B-12  1,000 mcg Oral Daily    sodium chloride flush  10 mL IntraVENous 2 times per day    heparin (porcine)  5,000 Units SubCUTAneous 3 times per day    predniSONE  40 mg Oral Daily    insulin lispro  0-12 Units SubCUTAneous TID     insulin lispro  0-6 Units SubCUTAneous Nightly    cefTRIAXone         Continuous Infusions:    sodium chloride Stopped (04/09/22 0637)    dextrose       PRN Medicationsalbuterol sulfate HFA, 2 puff, Q6H PRN  diazePAM, 5 mg, Q12H PRN  sodium chloride flush, 10 mL, PRN  sodium chloride, , PRN  magnesium sulfate, 1,000 mg, PRN  ondansetron, 4 mg, Q8H PRN   Or  ondansetron, 4 mg, Q6H PRN  polyethylene glycol, 17 g, Daily PRN  acetaminophen, 650 mg, Q6H PRN   Or  acetaminophen, 650 mg, Q6H PRN  glucose, 15 g, PRN  dextrose, 12.5 g, PRN  glucagon (rDNA), 1 mg, PRN  dextrose, 100 mL/hr, PRN  ipratropium-albuterol, 1 ampule, Q4H PRN  albuterol, 5 mg, Q6H PRN  oxyCODONE-acetaminophen, 1 tablet, Q8H PRN        Diagnostic Labs:  CBC:   Recent Labs     04/08/22 1844 04/09/22 0619   WBC 17.0* 12.9*   RBC 3.75* 3.55*   HGB 9.7* 9.1*   HCT 33.2* 31.1*   MCV 88.5 87.6   RDW 15.9* 16.1*    240     BMP:   Recent Labs     04/08/22 1844 04/09/22 0619   * 132*   K 4.2 4.3   CL 96* 94*   CO2 27 25   BUN 27* 26*   CREATININE 1.21* 1.12*     BNP: No results for input(s): BNP in the last 72 hours. PT/INR: No results for input(s): PROTIME, INR in the last 72 hours. APTT: No results for input(s): APTT in the last 72 hours. CARDIAC ENZYMES: No results for input(s): CKMB, CKMBINDEX, TROPONINI in the last 72 hours. Invalid input(s): CKTOTAL;3  FASTING LIPID PANEL:  Lab Results   Component Value Date    CHOL 144 11/17/2018    HDL 42 10/07/2020    TRIG 68 11/17/2018     LIVER PROFILE:   Recent Labs     04/08/22 1844   AST 34*   ALT 57*   BILITOT 0.86   ALKPHOS 123*      MICROBIOLOGY:   Lab Results   Component Value Date/Time    CULTURE NO GROWTH <24 HRS 04/09/2022 02:22 AM       Imaging:    XR CHEST PORTABLE    Result Date: 4/8/2022  Limited negative portable chest radiograph.        ASSESSMENT & PLAN     ASSESSMENT / PLAN:     Acute on chronic CHF  Echo previous admission showing EF 60%  Elevated proBNP 4000s  Home dose is bumex 2mg BID  Will start IV lasix 80 mg BID  Strict I and Os     COPD  Resume home bronchodilators  resp eval and treat  Prednisone 40 daily  CXR unremarkable  Patient approved but did not yet receive trilogy vent     Leukocytosis  17 today- reactive v infectious  Stage 1 sacral ulcer only possible identifiable source  Pancultures   Broad spectrum Abx - de escalate as necessary     STEPH/OHS  bipap nightly     Elevated troponins  Initial trop is 20  Trend x2 more     HIEN  Cr 1.12  BMP daily     Essential HTN  Continue home meds     Pre-DM  a1c 6.4  Med dose sliding scale  Patient was not taking PO meds  POCT glucose 4xdaily        DVT ppx: heparin  GI ppx: protonix   Diet: Regular  PT/OT/SW  Discharge Planning: per CM when medically cleared    Davie Degroot MD  Internal Medicine Resident, PGY-1  [facility]; [place], [place]  4/9/2022, 8:45 AM    Attestation and add on       I have discussed the care of Dina Peterson , including pertinent history and exam findings,      4/09/22    with the resident. I have seen and examined the patient and the key elements of all parts of the encounter have been performed by me . I agree with the assessment, plan and orders as documented by the resident. Principal Problem:    CHF (congestive heart failure), NYHA class I, acute on chronic, combined ([facility]  Active Problems:    Asthma    HTN (hypertension)    Acute respiratory failure with hypoxia and hypercapnia (HCC)    Morbid obesity with BMI of 50.0-59.9, adult (HCC)    Obesity hypoventilation syndrome (HCC)    STEPH (obstructive sleep apnea)    Pulmonary hypertension (HCC)    Normocytic anemia    Prediabetes    Hyperlipidemia  Resolved Problems:    * No resolved hospital problems. *        ''''''''''       MD KENNETH Rondon [address], [address] [place] [address].    Phone [phone]   Fax: [phone]  Answering Service: [phone]

## 2022-04-09 NOTE — [facility] REMOTE MONITORING
Attempted to contact primary RN regarding the sepsis bundle.     Jaclyn Bashir MSN, RN, [address] [place] [address] RN   Call back # [phone]

## 2022-04-09 NOTE — TELEPHONE ENCOUNTER
Patient missed her pain management appointment due to her admission and we provided a a few days of medication. Patient should contact her Pain Management Physician and/or PCP. Thanks!    -Akira Gillette

## 2022-04-09 NOTE — H&P
[address]     Department of Internal Medicine - Staff Internal Medicine Teaching Service          ADMISSION NOTE/HISTORY AND PHYSICAL EXAMINATION   Date: 4/8/2022  Patient Name: Yissel Gotti  Date of admission: 4/8/2022  6:22 PM  YOB: 1975  PCP: Kate Liu DO  History Obtained From:  patient, electronic medical record    CHIEF COMPLAINT     Chief complaint: SOB    HISTORY OF PRESENTING ILLNESS     The patient is a pleasant 55 y.o. 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 Patient states she has had increased shortness of breath the past 2 nights, unable to wear her CPAP nightly with increased fatigue. Patient is poor historian, states she wears 7 L O2 at home, however EMS on arrival saw patient on 4 L and was saturating in the mid 80s. Patient was placed on nonrebreather by EMS and saturations improved to 98%. Of note, patient had previous hospitalization earlier this month for COPD exacerbation complicated by pneumonia. At that time patient was treated with antibiotics and steroids. Pulmonology was consulted and patient was approved for trilogy noninvasive ventilator which she has yet to receive. Echocardiogram at that time showed EF 60%, \"D\" sign indicating RV pressure overload, moderate to severe MR, severe pulmonary hypertension. Patient is aware of these findings. On my evaluation, patient resting comfortably on 15 L nonrebreather mask acute distress. Patient is morbidly obese with BMI 57. Denies any cough, chest pain, leg pain/swelling. She does have skin irritation on her buttocks. States she has been compliant with all meds (inculding bronchodilators and bumex 2mg BID) except for her PO DM meds as those were held on previous admission. Afebrile, hemodynamic stable, tachycardic in the 110s at present.  Patient received prednisone and lasix 40mg IV in ED as well as rocephin and vancomycin. CXR in ED - limited negative CXR d/t body habitus. Significant labs include proBNP on admission 4192, was in 1000s on recent discharge. Leukocytosis of 17.0. Hemoglobin 9.7. Slight bump in creatinine from baseline- 1.21. glucose 238.       Review of Systems:  General ROS: Completed and except as mentioned above were negative   HEENT ROS: Completed and except as mentioned above were negative   Allergy and Immunology ROS:  Completed and except as mentioned above were negative  Hematological and Lymphatic ROS:  Completed and except as mentioned above were negative  Respiratory ROS:  + SOB, no CP or cough  Cardiovascular ROS:  Completed and except as mentioned above were negative  Gastrointestinal ROS: Completed and except as mentioned above were negative  Genito-Urinary ROS:  Completed and except as mentioned above were negative  Musculoskeletal ROS:  Completed and except as mentioned above were negative  Neurological ROS:  Completed and except as mentioned above were negative  Skin & Dermatological ROS:  Completed and except as mentioned above were negative  Psychological ROS:  Completed and except as mentioned above were negative    PAST MEDICAL HISTORY     Past Medical History:   Diagnosis Date    Acute on chronic diastolic CHF [facility]congestive heart failure) [facility] 09/15/2018    HIEN (acute kidney injury) ([facility] 05/06/2019    HIEN (acute kidney injury) ([facility] 11/20/2018    Asthma     CHF     Chronic obstructive lung disease ([facility]     Chronic respiratory failure with hypoxia ([facility]     on home O2 therapy    COPD     Diabetes mellitus, new onset ([facility] 05/06/2019    Former smoker     quit smoking about 17 months ago/ She has a 22.00 pack-year smoking history    HTN     Hyperglycemia     Hyperlipidemia     Hypertension     Morbid obesity with BMI of 60.0-69.9, adult ([facility]     Neuromuscular disorder ([facility]     Neuropathy Right hand    STEPH on CPAP     DME per Dr. Alberto Azar for CPAP of 18 cmh2o    Oxygen dependent     DME 06/2018 for home oxgyen at 2.5-3 lpm ATC    Pedal edema     Pneumonia     Pulmonary hypertension, moderate to severe ([address] 06/09/2018    Torn meniscus     Wears glasses        PAST SURGICAL HISTORY     Past Surgical History:   Procedure Laterality Date    ABDOMEN SURGERY      Patient had a PEG tube placed 1/2018, removed 4/2018    [id number]  June 5, 2019    CYSTOSCOPY N/A 6/5/2019    CYSTOSCOPY performed by Fuad Kaiser MD at [facility]  02/2018    Removed in April 2018    [facility] CATH POWER PICC TRIPLE  2/2/2018         JOINT REPLACEMENT      CT OFFICE/OUTPT VISIT,PROCEDURE ONLY N/A 2/17/2018    TRACHEOTOMY performed by Cr Pino MD at [address]  03/2018    TRACHEOTOMY  02/2018       ALLERGIES     Bee venom, Sulfa antibiotics, Asa [aspirin], Aspirin, Beta adrenergic blockers, Beta adrenergic blockers, and Sulfa antibiotics    MEDICATIONS PRIOR TO ADMISSION     Prior to Admission medications    Medication Sig Start Date End Date Taking? Authorizing Provider   amLODIPine (NORVASC) 5 MG tablet Take 1 tablet by mouth daily 3/28/22   Sreekanth Cameron MD   diazePAM (VALIUM) 5 MG tablet Take 5 mg by mouth every 12 hours as needed for Anxiety.     Historical Provider, MD   folic acid (FOLVITE) 1 MG tablet Take 1 mg by mouth daily    Historical Provider, MD   vitamin B-12 (CYANOCOBALAMIN) 1000 MCG tablet Take 1,000 mcg by mouth daily    Historical Provider, MD   Dulaglutide (TRULICITY) 5.77 DG/7.4OA SOPN Inject 0.75 mg into the skin once a week    Historical Provider, MD   metOLazone (ZAROXOLYN) 2.5 MG tablet Take 2.5 mg by mouth every other day    Historical Provider, MD   glipiZIDE (GLUCOTROL XL) 2.5 MG extended release tablet Take 2.5 mg by mouth    Historical Provider, MD GREEN MUCUS RELIEF 600 MG extended release tablet  10/15/19   Historical Provider, MD   nystatin (MYCOSTATIN) 422071 UNIT/GM cream  10/17/19   Historical Provider, MD   Ergocalciferol (VITAMIN D2 PO) Take 50,000 Units by mouth once a week  Patient not taking: Reported on 3/18/2022    Historical Provider, MD   potassium chloride (KLOR-CON M) 10 MEQ extended release tablet Take 1 tablet by mouth daily 7/25/19   Sylvester Patel MD   bumetanide (BUMEX) 1 MG tablet Take 2 mg by mouth 2 times daily    Historical Provider, MD   JANUVIA 50 MG tablet Take 50 mg by mouth daily 6/11/19   Historical Provider, MD   glucose monitoring kit (FREESTYLE) monitoring kit 1 kit by Does not apply route daily 5/8/19   Frida Hurtado MD   blood glucose monitor strips Test three  times a day & as needed for symptoms of irregular blood glucose.  5/8/19   Frida Hurtado MD   Lancets MISC 1 each by Does not apply route daily 5/8/19   Frida Hurtado MD   pantoprazole sodium (PROTONIX) 40 MG PACK packet Take 40 mg by mouth every morning (before breakfast)    Historical Provider, MD   spironolactone (ALDACTONE) 25 MG tablet Take 1 tablet by mouth daily 11/22/18   Tierney Erazo MD   isosorbide dinitrate (ISORDIL) 20 MG tablet Take 1 tablet by mouth 3 times daily 9/20/18   Saturnino Leos MD   hydrALAZINE (APRESOLINE) 25 MG tablet Take 1 tablet by mouth every 8 hours 9/20/18   Saturnino Leos MD   Blood Pressure KIT 1 Device by Does not apply route 2 times daily 9/20/18   Joseph Garcia MD   ferrous sulfate 325 (65 Fe) MG EC tablet Take 325 g by mouth 2 times daily (with meals) 4/20/18   Historical Provider, MD   loratadine (CLARITIN) 10 MG tablet Take 10 mg by mouth daily    Historical Provider, MD   tiotropium (SPIRIVA) 18 MCG inhalation capsule Inhale 1 capsule into the lungs  Patient not taking: Reported on 3/18/2022 8/31/17   Historical Provider, MD   sertraline (ZOLOFT) 100 MG tablet Take 100 mg by mouth daily    Historical Provider, MD   atorvastatin (LIPITOR) 40 MG tablet Take 1 tablet by mouth nightly 2/21/18   Richie Hawkins MD   albuterol sulfate  (90 Base) MCG/ACT inhaler Inhale 2 puffs into the lungs every 6 hours as needed for Wheezing    Historical Provider, MD   fluticasone (FLONASE) 50 MCG/ACT nasal spray 1 spray by Nasal route daily  Patient not taking: Reported on 3/18/2022    Historical Provider, MD   albuterol (PROVENTIL) (2.5 MG/3ML) 0.083% nebulizer solution Take 3 mLs by nebulization every 6 hours as needed for Wheezing. Patient not taking: Reported on 3/18/2022 9/24/14   Juan M Singh MD   budesonide-formoterol [facility]) 160-4.5 MCG/ACT AERO Inhale 2 puffs into the lungs 2 times daily. Patient not taking: Reported on 3/18/2022 9/24/14   Renate Ovalle MD       SOCIAL HISTORY     Tobacco:  former smoker. 25 PY  Alcohol:none  Illicits:  none    FAMILY HISTORY     Family History   Problem Relation Age of Onset    Emphysema Mother    [facility] Alzheimer's Disease Mother     Other Mother         Blood disorder    High Blood Pressure Father    [facility] Arthritis Father     Diabetes Sister     Heart Failure Sister     Kidney Disease Sister     High Blood Pressure Brother     Dementia Maternal Grandmother     High Blood Pressure Maternal Grandmother     Dementia Maternal Grandfather     High Blood Pressure Maternal Grandfather     Cancer Paternal Grandmother     Heart Disease Paternal Grandfather     Diabetes Sister     Heart Failure Sister     Other Sister         Intestinal problems    No Known Problems Sister     No Known Problems Son        PHYSICAL EXAM     Vitals: /77   Pulse 117   Temp 99.7 °F (37.6 °C) (Oral)   Resp 30   Ht 5' 6\" (1.676 m)   Wt (!) 355 lb (161 kg)   SpO2 100%   BMI 57.30 kg/m²   Tmax: Temp (24hrs), Av.7 °F (37.6 °C), Min:99.7 °F (37.6 °C), Max:99.7 °F (37.6 °C)    Last Body weight:   Wt Readings from Last 3 Encounters:   22 (!) 355 lb (161 kg)   22 (!) 355 lb (161 kg)   20 (!) 382 lb 9.6 oz (173.5 kg)     Body Mass Index : Body mass index is 57.3 kg/m².       PHYSICAL EXAMINATION:  Constitutional: This is a well developed, morbidly obese grade III 55y.o. year old female who is alert, oriented, cooperative and in no apparent distress. Head:normocephalic and atraumatic. EENT:  PERRLA. No conjunctival injections. Septum was midline, mucosa was without erythema, exudates or cobblestoning. No thrush was noted. Neck: Supple without thyromegaly. No elevated JVP. Trachea was midline. Respiratory: diminished breath sounds b/l. Mild  B/l wheezing. No accessory muscle use. muscles. No egophony noted. Cardiovascular: Regular without murmur, clicks, gallops or rubs. Abdomen: No rebound, rigidity or guarding was appreciated. Extremities:  No lower extremity edema, ulcerations, tenderness, varicosities or erythema. Skin:  Skin irritation sascral area.   No cyanosis or clubbing  Neurological/Psychiatric: The patient's general behavior, level of consciousness, thought content and emotional status is normal.          INVESTIGATIONS     Laboratory Testing:     Recent Results (from the past 24 hour(s))   CBC with Auto Differential    Collection Time: 04/08/22  6:44 PM   Result Value Ref Range    WBC 17.0 (H) 3.5 - 11.3 k/uL    RBC 3.75 (L) 3.95 - 5.11 m/uL    Hemoglobin 9.7 (L) 11.9 - 15.1 g/dL    Hematocrit 33.2 (L) 36.3 - 47.1 %    MCV 88.5 82.6 - 102.9 fL    MCH 25.9 25.2 - 33.5 pg    MCHC 29.2 28.4 - 34.8 g/dL    RDW 15.9 (H) 11.8 - 14.4 %    Platelets 073 590 - 878 k/uL    MPV 10.2 8.1 - 13.5 fL    NRBC Automated 0.0 0.0 per 100 WBC    Immature Granulocytes 0 0 %    Seg Neutrophils 90 (H) 36 - 66 %    Lymphocytes 6 (L) 24 - 44 %    Monocytes 4 1 - 7 %    Eosinophils % 0 (L) 1 - 4 %    Basophils 0 0 - 2 %    Absolute Immature Granulocyte 0.00 0.00 - 0.30 k/uL    Segs Absolute 15.30 (H) 1.8 - 7.7 k/uL    Absolute Lymph # 1.02 1.0 - 4.8 k/uL    Absolute Mono # 0.68 0.1 - 0.8 k/uL    Absolute Eos # 0.00 0.0 - 0.4 k/uL    Basophils Absolute 0.00 0.0 - 0.2 k/uL    Morphology ANISOCYTOSIS PRESENT    Comprehensive Metabolic Panel w/ Reflex to MG    Collection Time: 04/08/22  6:44 PM   Result Value Ref Range    Glucose 238 (H) 70 - 99 mg/dL    BUN 27 (H) 6 - 20 mg/dL    CREATININE 1.21 (H) 0.50 - 0.90 mg/dL    Calcium 9.0 8.6 - 10.4 mg/dL    Sodium 134 (L) 135 - 144 mmol/L    Potassium 4.2 3.7 - 5.3 mmol/L    Chloride 96 (L) 98 - 107 mmol/L    CO2 27 20 - 31 mmol/L    Anion Gap 11 9 - 17 mmol/L    Alkaline Phosphatase 123 (H) 35 - 104 U/L    ALT 57 (H) 5 - 33 U/L    AST 34 (H) <32 U/L    Total Bilirubin 0.86 0.3 - 1.2 mg/dL    Total Protein 7.0 6.4 - 8.3 g/dL    Albumin 3.8 3.5 - 5.2 g/dL    Albumin/Globulin Ratio 1.2 1.0 - 2.5    GFR Non- 48 (L) >60 mL/min    GFR  58 (L) >60 mL/min    GFR Comment         Troponin    Collection Time: 04/08/22  6:44 PM   Result Value Ref Range    Troponin, High Sensitivity 20 (H) 0 - 14 ng/L   Brain Natriuretic Peptide    Collection Time: 04/08/22  6:44 PM   Result Value Ref Range    Pro-BNP 4,192 (H) <300 pg/mL   HCG Qualitative, Serum    Collection Time: 04/08/22  6:44 PM   Result Value Ref Range    hCG Qual NEGATIVE NEGATIVE   COVID-19, Rapid    Collection Time: 04/08/22  7:05 PM    Specimen: Nasopharyngeal Swab   Result Value Ref Range    Specimen Description . NASOPHARYNGEAL SWAB     SARS-CoV-2, Rapid Not Detected Not Detected   Troponin    Collection Time: 04/08/22  7:43 PM   Result Value Ref Range    Troponin, High Sensitivity 20 (H) 0 - 14 ng/L       Imaging:   XR CHEST PORTABLE    Result Date: 4/8/2022  Limited negative portable chest radiograph. ASSESSMENT & PLAN     ASSESSMENT / PLAN:     IMPRESSION  This is a 55 y.o. female who presented with SOB and found to have acute CHF exacerbation.     Principal Problem:    CHF (congestive heart failure), NYHA class I, acute on chronic, combined (HCC)  Active Problems:    HTN (hypertension)    Acute respiratory failure with hypoxia and hypercapnia (HCC)    COPD exacerbation ([facility]    Morbid obesity with BMI of 50.0-59.9, adult (HCC)    Obesity hypoventilation syndrome (HCC)    STEPH (obstructive sleep apnea)    Pulmonary hypertension ([facility]    Prediabetes  Resolved Problems:    * No resolved hospital problems. *    Acute on chronic CHF  Echo previous admission showing EF 60%  Elevated proBNP 4000s  Home dose is bumex 2mg BID  Will start IV lasix 80 mg BID  Strict I and Os    COPD  Resume home bronchodilators  resp eval and treat  Prednisone 40 daily  CXR unremarkable  Patient approved but did not yet receive trilogy vent    Leukocytosis  17 today- reactive v infectious  Stage 1 sacral ulcer only possible identifiable source  Pancultures   Broad spectrum Abx - de escalate as necessary    STEPH/OHS  bipap nightly    Elevated troponins  Initial trop is 20  Trend x2 more    HIEN  Cr 1.2  BMP daily    Essential HTN  Continue home meds    Pre-DM  a1c 6.4  Med dose sliding scale  Patient was not taking PO meds  POCT glucose 4xdaily      DVT ppx: heparin  GI ppx: protonix    PT/OT/SW  Discharge Planning: per CM when medically cleared    Gris Corbin MD  Internal Medicine Resident, PGY-3  [facility];  [place], [place]  4/8/2022, 9:23 PM

## 2022-04-09 NOTE — FLOWSHEET NOTE
SPIRITUAL CARE DEPARTMENT - [address]  PROGRESS NOTE    Shift date: 4/9/22  Shift day: Saturday   Shift # 1    Room # 2004/2004-01   Name: Anita Retana            Age: 55 y.o. Gender: female          Muslim: [address] of Hindu: Unknown    Referral: Routine Visit    Admit Date & Time: 4/8/2022  6:22 PM    PATIENT/EVENT DESCRIPTION:  Anita Retana is a 55 y.o. female in room 2004. Patient has a history of congestive heart failure. Patient was discharged and then did not feel well and returned to the hospital.     SPIRITUAL ASSESSMENT/INTERVENTION:   rounded on CAR-2. Patient was laying in bed watching tv.  entered the room and met and conversed with the patient. The patient explained how she had trouble breathing and came back to the hospital.  writing note provided active listening to the patient about her medical condition.  provided empathy and prayed for the patient. The patient was grateful for the visitation and the prayers. SPIRITUAL CARE FOLLOW-UP PLAN:  Chaplains will remain available to offer spiritual and emotional support as needed. Electronically signed by Ludivina Ma, on 4/9/2022 at 1:11 PM.  [facility]  [phone]       04/09/22 1309   Encounter Summary   Services provided to: Patient   Referral/Consult From: [address] [place] [address] Parent; Children;Family members   Place of Church   ([facility])   Continue Visiting   (4/9/22)   Complexity of Encounter Moderate   Length of Encounter 15 minutes   Spiritual Assessment Completed Yes   Routine   Type Initial   Assessment Calm; Approachable;Coping; Hopeful;Peaceful   Intervention Active listening;Explored feelings, thoughts, concerns;Nurtured hope;Prayer;Sustaining presence/ Ministry of presence   Outcome Acceptance;Comfort;Expressed gratitude;Engaged in conversation;Coping; Hopeful;Receptive;Venting emotion;Encouraged

## 2022-04-09 NOTE — ED NOTES
The following labs labeled with pt sticker and tubed to lab:     [] Blue     [] Lavender   [] on ice  [] Green/yellow  [x] Green/black [x] on ice  [] Yellow  [] Red  [] Pink      [] COVID-19 swab    [] Rapid  [] PCR  [] Flu swab  [] Peds Viral Panel     [] Urine Sample  [] Pelvic Cultures  [x] Blood Cultures  x1           Simone Mera RN  04/08/22 5830

## 2022-04-09 NOTE — PLAN OF CARE
Nutrition Problem #1: Increased nutrient needs  Intervention: Food and/or Nutrient Delivery: Continue Current Diet,Start Oral Nutrition Supplement  Nutritional Goals: Meet at least 75% of estimated nutrition needs.

## 2022-04-09 NOTE — [facility] REMOTE MONITORING
Spoke with primary RN Amandaregarding 6 hour Sepsis bundle.     Ashley Low MSN, RN, Sushil [address] RN   Call back # [phone]

## 2022-04-09 NOTE — PLAN OF CARE
BRONCHOSPASM/BRONCHOCONSTRICTION     []         IMPROVE AERATION/BREATH SOUNDS  []   ADMINISTER BRONCHODILATOR THERAPY AS APPROPRIATE  []   ASSESS BREATH SOUNDS  []   IMPLEMENT AEROSOL/MDI PROTOCOL  []   PATIENT EDUCATION AS NEEDED   Anthony Aranda Assessment complete. CHF (congestive heart failure), NYHA class I, acute on chronic, combined (HCC) [I50.43]  Acute on chronic congestive heart failure, unspecified heart failure type ([facility] [I50.9] . Vitals:    04/09/22 0848   BP:    Pulse:    Resp: 22   Temp:    SpO2: (!) 87%   . Patients home meds are   Prior to Admission medications    Medication Sig Start Date End Date Taking? Authorizing Provider   amLODIPine (NORVASC) 5 MG tablet Take 1 tablet by mouth daily 3/28/22   Amna Faye MD   diazePAM (VALIUM) 5 MG tablet Take 5 mg by mouth every 12 hours as needed for Anxiety.     Historical Provider, MD   folic acid (FOLVITE) 1 MG tablet Take 1 mg by mouth daily    Historical Provider, MD   vitamin B-12 (CYANOCOBALAMIN) 1000 MCG tablet Take 1,000 mcg by mouth daily    Historical Provider, MD   Dulaglutide (TRULICITY) 2.20 ZA/9.0KL SOPN Inject 0.75 mg into the skin once a week    Historical Provider, MD   metOLazone (ZAROXOLYN) 2.5 MG tablet Take 2.5 mg by mouth every other day    Historical Provider, MD   glipiZIDE (GLUCOTROL XL) 2.5 MG extended release tablet Take 2.5 mg by mouth  Patient not taking: Reported on 4/9/2022    Historical Provider, MD   SM MUCUS RELIEF 600 MG extended release tablet  10/15/19   Historical Provider, MD   nystatin (MYCOSTATIN) 023623 UNIT/GM cream  10/17/19   Historical Provider, MD   Ergocalciferol (VITAMIN D2 PO) Take 50,000 Units by mouth once a week  Patient not taking: Reported on 3/18/2022    Historical Provider, MD   potassium chloride (KLOR-CON M) 10 MEQ extended release tablet Take 1 tablet by mouth daily 7/25/19   Maryuri Brya MD   bumetanide (BUMEX) 1 MG tablet Take 2 mg by mouth 2 times daily    Historical Provider, MD   JANUVIA 50 MG tablet Take 50 mg by mouth daily 6/11/19   Historical Provider, MD   glucose monitoring kit (FREESTYLE) monitoring kit 1 kit by Does not apply route daily 5/8/19   Primitivo Barajas MD   blood glucose monitor strips Test three  times a day & as needed for symptoms of irregular blood glucose.  5/8/19   Primitivo Barajas MD   Lancets MISC 1 each by Does not apply route daily 5/8/19   Primitivo Barajas MD   pantoprazole sodium (PROTONIX) 40 MG PACK packet Take 40 mg by mouth every morning (before breakfast)    Historical Provider, MD   spironolactone (ALDACTONE) 25 MG tablet Take 1 tablet by mouth daily 11/22/18   Queen Maikol MD   isosorbide dinitrate (ISORDIL) 20 MG tablet Take 1 tablet by mouth 3 times daily 9/20/18   Raina Weaver MD   hydrALAZINE (APRESOLINE) 25 MG tablet Take 1 tablet by mouth every 8 hours 9/20/18   Raina Weaver MD   Blood Pressure KIT 1 Device by Does not apply route 2 times daily 9/20/18   Mayelin Barry MD   ferrous sulfate 325 (65 Fe) MG EC tablet Take 325 g by mouth 2 times daily (with meals) 4/20/18   Historical Provider, MD   loratadine (CLARITIN) 10 MG tablet Take 10 mg by mouth daily    Historical Provider, MD   tiotropium (SPIRIVA) 18 MCG inhalation capsule Inhale 1 capsule into the lungs  8/31/17   Historical Provider, MD   sertraline (ZOLOFT) 100 MG tablet Take 100 mg by mouth daily    Historical Provider, MD   atorvastatin (LIPITOR) 40 MG tablet Take 1 tablet by mouth nightly 2/21/18   Prudencio Brian MD   albuterol sulfate  (90 Base) MCG/ACT inhaler Inhale 2 puffs into the lungs every 6 hours as needed for Wheezing    Historical Provider, MD   fluticasone (FLONASE) 50 MCG/ACT nasal spray 1 spray by Nasal route daily     Historical Provider, MD   albuterol (PROVENTIL) (2.5 MG/3ML) 0.083% nebulizer solution Take 3 mLs by nebulization every 6 hours as needed for Wheezing. 9/24/14   Benny Donis MD   budesonide-formoterol [facility] 160-4.5 MCG/ACT AERO Inhale 2 puffs into the lungs 2 times daily.  9/24/14   Abisai Dill MD   .  Recent Surgical History: None = 0     Assessment on 6 lpm nasal cannula during day bipap @ hs and prn    RR 18  Breath Sounds: clear diminished      Bronchodilator assessment at level  1  Hyperinflation assessment at level   Secretion Management assessment at level      [x]    Bronchodilator Assessment  BRONCHODILATOR ASSESSMENT SCORE  Score 0 1 2 3 4 5   Breath Sounds   []  Patient Baseline [x]  No Wheeze good aeration []  Faint, scattered wheezing, good aeration []  Expiratory Wheezing and or moderately diminished []  Insp/Exp wheeze and/or very diminished []  Insp/Exp and/ or marked distress   Respiratory Rate   []  Patient Baseline [x]  Less than 20 [x]  Less than 20 []  20-25 []  Greater than 25 []  Greater than 25   Peak flow % of Pred or PB [x]  NA   []  Greater than 90%  []  81-90% []  71-80% []  Less than or equal to 70%  or unable to perform []  Unable due to Respiratory Distress   Dyspnea re []  Patient Baseline [x]  No SOB [x]  No SOB [x]  SOB on exertion []  SOB min activity []  At rest/acute   e FEV% Predicted       [x]  NA []  Above 69%  []  Unable []  Above 60-69%  []  Unable []  Above 50-59%  []  Unable []  Above 35-49%  []  Unable []  Less than 35%  []  Unable

## 2022-04-09 NOTE — ED NOTES
The following labs labeled with pt sticker and tubed to lab:     [] Blue     [] Lavender   [] on ice  [] Green/yellow  [] Green/black [] on ice  [] Yellow  [] Red  [] Pink      [] COVID-19 swab    [] Rapid  [] PCR  [] Flu swab  [] Peds Viral Panel     [] Urine Sample  [] Pelvic Cultures  [x] Blood Cultures  x1           Ayad Partida RN  04/08/22 6682

## 2022-04-09 NOTE — ED PROVIDER NOTES
[facility]  ED  Emergency Department Encounter  EmergencyMedicine Resident     Pt Jeri Jhaveri  MRN: [id number]  Victorinogfabelardo 1975  Date of evaluation: 22  PCP:  Joaquin Slater DO    CHIEF COMPLAINT       Chief Complaint   Patient presents with    Respiratory Distress     Pt arrives per EMS with difficulty breathing. Pt recently treated for pneumonia. Pt given 40mg prednisone per EMS and placed on NRB. HISTORY OF PRESENT ILLNESS  (Location/Symptom, Timing/Onset, Context/Setting, Quality, Duration, Modifying Factors, Severity.)      Jodie Toth is a 55 y.o. female who presents with shortness of breath, fatigue and bedsores. Patient with history of COPD, CHF, sleep apnea. States that she was recently discharged from the hospital after being admitted for pneumonia. Reports her symptoms are improving at day of discharge but over the past couple of days has had worsening shortness of breath. Patient on chronic home O2 but was hypoxic for EMS on their arrival on her home O2. Placed on 15 L nonrebreather. Patient stating that she has had some increased leg swelling recently as well. No associated chest pain at this time    PAST MEDICAL / SURGICAL / SOCIAL / FAMILY HISTORY      has a past medical history of Acute on chronic diastolic CHF (congestive heart failure) ([address] HIEN (acute kidney injury) ([address] HIEN (acute kidney injury) ([address] Asthma, CHF, Chronic obstructive lung disease ([address] Chronic respiratory failure with hypoxia ([address] COPD, Diabetes mellitus, new onset ([address] Former smoker, HTN, Hyperglycemia, Hyperlipidemia, Hypertension, Morbid obesity with BMI of 60.0-69.9, adult ([address] Neuromuscular disorder ([address] STEPH on CPAP, Oxygen dependent, Pedal edema, Pneumonia, Pulmonary hypertension, moderate to severe ([address] Torn meniscus, and Wears glasses. has a past surgical history that includes  section ();  Abdomen surgery; joint replacement; Cystoscopy (2019); Cystoscopy (N/A, 2019); hc cath power picc triple (2018); pr office/outpt visit,procedure only (N/A, 2018); Tracheotomy (2018); Gastrostomy tube placement (2018); and tracheostomy closure (2018). Social History     Socioeconomic History    Marital status: Single     Spouse name: Not on file    Number of children: Not on file    Years of education: Not on file    Highest education level: Not on file   Occupational History    Not on file   Tobacco Use    Smoking status: Former Smoker     Packs/day: 1.00     Years: 22.00     Pack years: 22.00     Types: Cigarettes     Start date:      Quit date: 1/15/2018     Years since quittin.2    Smokeless tobacco: Never Used   Vaping Use    Vaping Use: Never used   Substance and Sexual Activity    Alcohol use: No    Drug use: No    Sexual activity: Not Currently   Other Topics Concern    Not on file   Social History Narrative    ** Merged History Encounter **          Social Determinants of Health     Financial Resource Strain:     Difficulty of Paying Living Expenses: Not on file   Food Insecurity:     Worried About [address] in the Last Year: Not on file    [address] in the Last Year: Not on file   Transportation Needs:     Lack of Transportation (Medical): Not on file    Lack of Transportation (Non-Medical):  Not on file   Physical Activity:     Days of Exercise per Week: Not on file    Minutes of Exercise per Session: Not on file   Stress:     Feeling of Stress : Not on file   Social Connections:     Frequency of Communication with Friends and Family: Not on file    Frequency of Social Gatherings with Friends and Family: Not on file    Attends Mandaen Services: Not on file    Active Member of Clubs or Organizations: Not on file    Attends Club or Organization Meetings: Not on file    Marital Status: Not on file   Intimate Partner Violence:     Fear of Current or Ex-Partner: Not on file    Emotionally Abused: Not on file    Physically Abused: Not on file    Sexually Abused: Not on file   Housing Stability:     Unable to Pay for Housing in the Last Year: Not on file    Number of Places Lived in the Last Year: Not on file    Unstable Housing in the Last Year: Not on file       Family History   Problem Relation Age of Onset    Emphysema Mother     Alzheimer's Disease Mother     Other Mother         Blood disorder    High Blood Pressure Father     Arthritis Father     Diabetes Sister     Heart Failure Sister     Kidney Disease Sister     High Blood Pressure Brother     Dementia Maternal Grandmother     High Blood Pressure Maternal Grandmother     Dementia Maternal Grandfather     High Blood Pressure Maternal Grandfather     Cancer Paternal Grandmother     Heart Disease Paternal Grandfather     Diabetes Sister     Heart Failure Sister     Other Sister         Intestinal problems    No Known Problems Sister     No Known Problems Son        Allergies:  Bee venom, Sulfa antibiotics, Asa [aspirin], Aspirin, Beta adrenergic blockers, Beta adrenergic blockers, and Sulfa antibiotics    Home Medications:  Prior to Admission medications    Medication Sig Start Date End Date Taking? Authorizing Provider   amLODIPine (NORVASC) 5 MG tablet Take 1 tablet by mouth daily 3/28/22   Amna Faye MD   diazePAM (VALIUM) 5 MG tablet Take 5 mg by mouth every 12 hours as needed for Anxiety.     Historical Provider, MD   folic acid (FOLVITE) 1 MG tablet Take 1 mg by mouth daily    Historical Provider, MD   vitamin B-12 (CYANOCOBALAMIN) 1000 MCG tablet Take 1,000 mcg by mouth daily    Historical Provider, MD   Dulaglutide (TRULICITY) 5.61 ZS/5.7RG SOPN Inject 0.75 mg into the skin once a week    Historical Provider, MD   metOLazone (ZAROXOLYN) 2.5 MG tablet Take 2.5 mg by mouth every other day    Historical Provider, MD   glipiZIDE (GLUCOTROL XL) 2.5 MG extended release tablet Take 2.5 mg by mouth    Historical Provider, MD GREEN MUCUS RELIEF 600 MG extended release tablet  10/15/19   Historical Provider, MD   nystatin (MYCOSTATIN) 538531 UNIT/GM cream  10/17/19   Historical Provider, MD   Ergocalciferol (VITAMIN D2 PO) Take 50,000 Units by mouth once a week  Patient not taking: Reported on 3/18/2022    Historical Provider, MD   potassium chloride (KLOR-CON M) 10 MEQ extended release tablet Take 1 tablet by mouth daily 7/25/19   Katelynn Conrad MD   bumetanide (BUMEX) 1 MG tablet Take 2 mg by mouth 2 times daily    Historical Provider, MD   JANUVIA 50 MG tablet Take 50 mg by mouth daily 6/11/19   Historical Provider, MD   glucose monitoring kit (FREESTYLE) monitoring kit 1 kit by Does not apply route daily 5/8/19   Jyoa Newman MD   blood glucose monitor strips Test three  times a day & as needed for symptoms of irregular blood glucose.  5/8/19   Joya Newman MD   Lancets MISC 1 each by Does not apply route daily 5/8/19   Joya Newman MD   pantoprazole sodium (PROTONIX) 40 MG PACK packet Take 40 mg by mouth every morning (before breakfast)    Historical Provider, MD   spironolactone (ALDACTONE) 25 MG tablet Take 1 tablet by mouth daily 11/22/18   Delon Ritchie MD   isosorbide dinitrate (ISORDIL) 20 MG tablet Take 1 tablet by mouth 3 times daily 9/20/18   Nakia Ventura MD   hydrALAZINE (APRESOLINE) 25 MG tablet Take 1 tablet by mouth every 8 hours 9/20/18   Nakia Ventura MD   Blood Pressure KIT 1 Device by Does not apply route 2 times daily 9/20/18   Joseph Garcia MD   ferrous sulfate 325 (65 Fe) MG EC tablet Take 325 g by mouth 2 times daily (with meals) 4/20/18   Historical Provider, MD   loratadine (CLARITIN) 10 MG tablet Take 10 mg by mouth daily    Historical Provider, MD   tiotropium (SPIRIVA) 18 MCG inhalation capsule Inhale 1 capsule into the lungs  Patient not taking: Reported on 3/18/2022 8/31/17   Historical Provider, MD   sertraline (ZOLOFT) 100 MG tablet Take 100 mg by mouth daily    Historical Provider, MD   atorvastatin (LIPITOR) 40 MG tablet Take 1 tablet by mouth nightly 2/21/18   Ofelia Lopez MD   albuterol sulfate  (90 Base) MCG/ACT inhaler Inhale 2 puffs into the lungs every 6 hours as needed for Wheezing    Historical Provider, MD   fluticasone (FLONASE) 50 MCG/ACT nasal spray 1 spray by Nasal route daily  Patient not taking: Reported on 3/18/2022    Historical Provider, MD   albuterol (PROVENTIL) (2.5 MG/3ML) 0.083% nebulizer solution Take 3 mLs by nebulization every 6 hours as needed for Wheezing. Patient not taking: Reported on 3/18/2022 9/24/14   Lb Amaya MD   budesonide-formoterol [facility]) 160-4.5 MCG/ACT AERO Inhale 2 puffs into the lungs 2 times daily. Patient not taking: Reported on 3/18/2022 9/24/14   Robin Gonzalez MD       REVIEW OF SYSTEMS    (2-9 systems for level 4, 10 or more for level 5)      Review of Systems   Constitutional: Positive for fatigue. Negative for fever. Eyes: Negative for visual disturbance. Respiratory: Positive for shortness of breath. Cardiovascular: Negative for chest pain. Gastrointestinal: Negative for abdominal pain, nausea and vomiting. Genitourinary: Negative for dysuria and flank pain. Skin: Positive for wound (bed sores). Negative for rash. Neurological: Negative for weakness, light-headedness, numbness and headaches. Psychiatric/Behavioral: Negative for confusion. PHYSICAL EXAM   (up to 7 for level 4, 8 or more for level 5)      INITIAL VITALS:   /68   Pulse 117   Temp 99.7 °F (37.6 °C) (Oral)   Resp 30   Ht 5' 6\" (1.676 m)   Wt (!) 355 lb (161 kg)   SpO2 100%   BMI 57.30 kg/m²     Physical Exam  Constitutional:       General: She is not in acute distress. Appearance: She is obese. She is not toxic-appearing. HENT:      Head: Normocephalic and atraumatic. Eyes:      Extraocular Movements: Extraocular movements intact.       Pupils: Pupils are equal, round, and reactive to light. Cardiovascular:      Rate and Rhythm: Tachycardia present. Heart sounds: No murmur heard. No friction rub. No gallop. Pulmonary:      Breath sounds: Wheezing (bilateral bases) present. Abdominal:      Tenderness: There is no abdominal tenderness. Musculoskeletal:      Right lower leg: No edema. Left lower leg: No edema. Skin:     Findings: Lesion (Stage I sacral ulcer) present. No rash. Neurological:      Mental Status: She is alert. Sensory: No sensory deficit. Motor: No weakness.          DIFFERENTIAL  DIAGNOSIS     PLAN (LABS / IMAGING / EKG):  Orders Placed This Encounter   Procedures    COVID-19, Rapid    Culture, Blood 1    Culture, Blood 1    XR CHEST PORTABLE    CBC with Auto Differential    Comprehensive Metabolic Panel w/ Reflex to MG    Troponin    Brain Natriuretic Peptide    HCG Qualitative, Serum    Lactic Acid    Inpatient consult to Hospitalist    Inpatient consult to Internal Medicine    Initiate ED RT Aerosol protocol    EKG 12 Lead    ADMIT TO INPATIENT       MEDICATIONS ORDERED:  Orders Placed This Encounter   Medications    magnesium sulfate 2000 mg in 50 mL IVPB premix    ipratropium-albuterol (DUONEB) nebulizer solution 1 ampule     Order Specific Question:   Initiate RT Bronchodilator Protocol     Answer:   No    albuterol (PROVENTIL) nebulizer solution 5 mg     Order Specific Question:   Initiate RT Bronchodilator Protocol     Answer:   No    furosemide (LASIX) injection 40 mg    cefTRIAXone (ROCEPHIN) 1000 mg IVPB in NS 50ml minibag     Order Specific Question:   Antimicrobial Indications     Answer:   Skin and Soft Tissue Infection    vancomycin (VANCOCIN) 2,500 mg in dextrose 5 % 500 mL IVPB     Order Specific Question:   Antimicrobial Indications     Answer:   Skin and Soft Tissue Infection    cefTRIAXone (ROCEPHIN) 1 g injection     Anu Brian: vivianinet override    sodium chloride 0.9 % infusion Liliana Alu: cabinet override    oxyCODONE-acetaminophen (PERCOCET) 5-325 MG per tablet 1 tablet       DDX: COPD exacerbation, CHF, pneumonia    DIAGNOSTIC RESULTS / EMERGENCY DEPARTMENT COURSE / MDM   LAB RESULTS:  Results for orders placed or performed during the hospital encounter of 04/08/22   COVID-19, Rapid    Specimen: Nasopharyngeal Swab   Result Value Ref Range    Specimen Description . NASOPHARYNGEAL SWAB     SARS-CoV-2, Rapid Not Detected Not Detected   Culture, Blood 1    Specimen: Blood   Result Value Ref Range    Specimen Description . BLOOD     Special Requests LT FOREARM 20 ML     Culture NO GROWTH <24 HRS    Culture, Blood 1    Specimen: Blood   Result Value Ref Range    Specimen Description . BLOOD     Special Requests LT AC 20 ML     Culture NO GROWTH <24 HRS    CBC with Auto Differential   Result Value Ref Range    WBC 17.0 (H) 3.5 - 11.3 k/uL    RBC 3.75 (L) 3.95 - 5.11 m/uL    Hemoglobin 9.7 (L) 11.9 - 15.1 g/dL    Hematocrit 33.2 (L) 36.3 - 47.1 %    MCV 88.5 82.6 - 102.9 fL    MCH 25.9 25.2 - 33.5 pg    MCHC 29.2 28.4 - 34.8 g/dL    RDW 15.9 (H) 11.8 - 14.4 %    Platelets 385 209 - 811 k/uL    MPV 10.2 8.1 - 13.5 fL    NRBC Automated 0.0 0.0 per 100 WBC    Immature Granulocytes 0 0 %    Seg Neutrophils 90 (H) 36 - 66 %    Lymphocytes 6 (L) 24 - 44 %    Monocytes 4 1 - 7 %    Eosinophils % 0 (L) 1 - 4 %    Basophils 0 0 - 2 %    Absolute Immature Granulocyte 0.00 0.00 - 0.30 k/uL    Segs Absolute 15.30 (H) 1.8 - 7.7 k/uL    Absolute Lymph # 1.02 1.0 - 4.8 k/uL    Absolute Mono # 0.68 0.1 - 0.8 k/uL    Absolute Eos # 0.00 0.0 - 0.4 k/uL    Basophils Absolute 0.00 0.0 - 0.2 k/uL    Morphology ANISOCYTOSIS PRESENT    Comprehensive Metabolic Panel w/ Reflex to MG   Result Value Ref Range    Glucose 238 (H) 70 - 99 mg/dL    BUN 27 (H) 6 - 20 mg/dL    CREATININE 1.21 (H) 0.50 - 0.90 mg/dL    Calcium 9.0 8.6 - 10.4 mg/dL    Sodium 134 (L) 135 - 144 mmol/L    Potassium 4.2 3.7 - 5.3 mmol/L Chloride 96 (L) 98 - 107 mmol/L    CO2 27 20 - 31 mmol/L    Anion Gap 11 9 - 17 mmol/L    Alkaline Phosphatase 123 (H) 35 - 104 U/L    ALT 57 (H) 5 - 33 U/L    AST 34 (H) <32 U/L    Total Bilirubin 0.86 0.3 - 1.2 mg/dL    Total Protein 7.0 6.4 - 8.3 g/dL    Albumin 3.8 3.5 - 5.2 g/dL    Albumin/Globulin Ratio 1.2 1.0 - 2.5    GFR Non- 48 (L) >60 mL/min    GFR  58 (L) >60 mL/min    GFR Comment         Troponin   Result Value Ref Range    Troponin, High Sensitivity 20 (H) 0 - 14 ng/L   Troponin   Result Value Ref Range    Troponin, High Sensitivity 20 (H) 0 - 14 ng/L   Brain Natriuretic Peptide   Result Value Ref Range    Pro-BNP 4,192 (H) <300 pg/mL   HCG Qualitative, Serum   Result Value Ref Range    hCG Qual NEGATIVE NEGATIVE   Lactic Acid   Result Value Ref Range    Lactic Acid, Whole Blood 2.4 (H) 0.7 - 2.1 mmol/L       IMPRESSION/ ED Course: Chronically ill-appearing 55year-old, morbidly obese female with history of CHF, COPD presenting with shortness of breath. Patient on 15 L O2 via nonrebreather mask on arrival, normal oxygen saturation on supplemental O2. Lungs with some mild wheezes at the bases. Was given prednisone by EMS prior to arrival.  Given IV magnesium, nebulizer treatment here. Chest x-ray without any evidence of pneumonia. Labs remarkable for leukocytosis of 17, mildly elevated lactic acid of 2.4. Patient does have concomitant CHF exacerbation as well with significantly elevated BNP. Treated in emergency department with Lasix. Patient also complains of bedsores, noted to have stage I ulcer around sacrum. No other obvious source of infection at this time. Will treat with Rocephin and vancomycin.   Patient admitted to internal medicine service    Sepsis Times and Checklist  Vital Signs: BP: 111/68  Pulse: 117  Resp: 30  Temp: 99.7 °F (37.6 °C) SpO2: 100 %  SIRS (>2)   Temp > 38.3C or < 36C   HR > 90   RR > 20   WBC > 12 or < 4 or >10% bands  SIRS (>2) and confirmed or suspected source of infection = Sepsis    Sepsis Identified   Date: 4//22  Time: 1844  Sepsis Orders:   CBC: Yes   CMP: Yes   PT/PTT: No -Not indicated, supply shortage   Blood Cultures x2: Yes   Urinalysis and Urine Culture: Yes   Lactate: Yes   Broad Spectrum Antibiotics Given (within 3 hours of sepsis identification, after blood cultures): Yes              IV Crystalloid given: No - concomitant CHF exacerbation, risks of fluids outweigh benefits. If lactate >2.0 MUST repeat within 6 hours    Is lactate > 4.0:  No  If lactate >4.0 OR hypotension 30ml/kg crystalloid MUST be ordered. Fluids must be completed within 3 hours of sepsis identification. RADIOLOGY:  XR CHEST PORTABLE    Result Date: 4/8/2022  EXAMINATION: ONE XRAY VIEW OF THE CHEST 4/8/2022 4:37 pm COMPARISON: 03/22/2022 HISTORY: ORDERING SYSTEM PROVIDED HISTORY: Shortness of breath, COPD and CHF, recent admission for pneumonia TECHNOLOGIST PROVIDED HISTORY: Shortness of breath, COPD and CHF, recent admission for pneumonia Reason for Exam: upr,sob, FINDINGS: Examination is limited by the patient's body habitus and by portable technique. Cardial pericardial silhouette is prominent but stable. There are low lung volumes is secondary bronchovascular crowding. No focal infiltrate is identified. No pneumothorax. No free air. No acute bony abnormality. Limited negative portable chest radiograph. EKG  EKG Interpretation    Interpreted by me    Rhythm: normal sinus   Rate: Tachycardic  Axis: normal  Ectopy: none  Conduction: normal  ST Segments: no acute change  T Waves:  Inversions in inferior and anterior leads  Q Waves: none    Clinical Impression: Sinus tachycardia, nonspecific EKG    All EKG's are interpreted by the Emergency Department Physician who either signs or Co-signs this chart in the absence of a cardiologist.      CONSULTS:  IP CONSULT TO HOSPITALIST  IP CONSULT TO INTERNAL MEDICINE  IP CONSULT TO CASE MANAGEMENT    CRITICAL CARE:  Please see attending note    FINAL IMPRESSION      1. Acute on chronic congestive heart failure, unspecified heart failure type ([facility] [address]          DISPOSITION / PLAN     DISPOSITION Admitted 04/08/2022 09:22:26 PM      PATIENT REFERRED TO:  No follow-up provider specified.     DISCHARGE MEDICATIONS:  New Prescriptions    No medications on file       Naz Narayanan DO  Emergency Medicine Resident    (Please note that portions of thisnote were completed with a voice recognition program.  Efforts were made to edit the dictations but occasionally words are mis-transcribed.)        Naz Narayanan DO  Resident  04/09/22 0020

## 2022-04-09 NOTE — PROGRESS NOTES
Congestive Heart Failure Education completed and charted. CHF booklet given. Patient was receptive to education. Discussed the  importance of medication compliance. Discussed the importance of a heart healthy diet. Discussed 2000 mg sodium-restricted daily diet. Patient instructed to limit fluid intake to  1.5 to 2 liters per day. Patient instructed to weigh self at the same time of each day each morning, reinforced teaching to monitor for 3-5 lb weight increase over 1-2 days notify physician if change noted. Signs and symptoms of CHF discussed with patient, such as feeling more tired than normal, feeling short of breath, coughing that increases when lying down, sudden weight gain, swelling of the feet, legs or belly. Patient verbalized understanding to notify physician office if these symptoms occur. She is a past patient of CHF clinic. Last visit Sep 2020. She is will to return however mobility and going to appts are extremely difficult for her. Has home care and an aid. Will phone Georgina Valencia RN with [facility] to see if she can assist  patient in any way with any needs.

## 2022-04-09 NOTE — CARE COORDINATION
Case Management Initial Discharge Plan  Hailey Gonzalez,             Met with:patient to discuss discharge plans. Information verified: address, contacts, phone number, , insurance Yes  Insurance Provider: Angélica Ferro and [employer] [facility]    Emergency Contact/Next of Kin name & number: Nadia Mendez [phone]  Who are involved in patient's support system? Son, his fiance, Aid    PCP: Hailey Gurrola DO  Date of last visit: 4 weeks ago      Discharge Planning    Living Arrangements:  [address] 1 stories  ramped to get into front door,  0 stairs to climb to reach second floor  Location of bedroom/bathroom in home main level    Patient able to perform ADL's:dependent    Current Services (outpatient & in home) [facility] and Aid-she relates aid is through her [facility], Passport? Aid comes M-F 9-5, sat 9-2 and  6pm-8pm  DME equipment: Medical bed (in poor condition), RW, electric scooter, BSC, CPAP, w/c, oxygen  DME provider: [facility] for O2    Is patient receiving oral anticoagulation therapy? No    If indicated:   Physician managing anticoagulation treatment:    Where does patient obtain lab work for ATC treatment? Does patient have any issues/concerns obtaining medications? Yes  If yes, what are patient's concerns? Was unable to get prescription for pain medicine filled. Pharmacy refused, something about \"opioid dependence\"    Is there a preferred Pharmacy after hours or on weekends? Yes    If yes, which pharmacy? [facility]    Potential Assistance Needed:  N/A    Patient agreeable to home care: Yes, current with [facility]  [place] of choice provided:  yes    Prior SNF/Rehab Placement and Facility: no  Agreeable to SNF/Rehab: Yes  [place] of choice provided: yes     Evaluation: no    Expected Discharge date:       Patient expects to be discharged to: If home: is the family and/or caregiver wiling & able to provide support at home?  Has aid and son and his patti live with her  Who will be providing this support? Son , aid    Follow Up Appointment: Best Day/ Time: Monday AM    Transportation provider: none  Transportation arrangements needed for discharge: Yes     Readmission Risk              Risk of Unplanned Readmission:  40             Does patient have a readmission risk score greater than 14?: Yes  If yes, follow-up appointment must be made within 7 days of discharge. Goals of Care:       Educated patient on transitional options, provided freedom of choice and are agreeable with plan      Discharge Plan:  Patient is current with [facility] and has and aid that comes everyday. CM discussed SNF as she was bed bound at home. She was able to get out of bed and do some ambulation at home prior to her previous hospitalization. She is open to SNF for PT/OT. Gave list and requested 3 choices. Patient needs a trilogy machine and when she was discharged at last admission she was still waiting on insurance to authorize it. She went home and used her old CPAP machine with a mask from [facility]. She relates it did not work well and she would wake up tired. She also went home with \"sheers\"  In the crease between her buttocks. She relates these became worse while at home and now she has sores that are infected. When she was here last time she was able to bear weight and stand. When she went home she was not able to get out of bed. She says she has an old \"medical bed\" but it is not in good condition. . CM called Lennie Espinosa with [facility] and he relates that Cipriano Araujo is still pending for Trilogy machine. His company is not able to call until Monday to follow up with insurance about this.            Electronically signed by Connor Min RN on 4/9/22 at 3:05 PM EDT

## 2022-04-09 NOTE — PROGRESS NOTES
Comprehensive Nutrition Assessment    Type and Reason for Visit:  Initial,Positive Nutrition Screen (Wound)    Nutrition Recommendations/Plan: Continue current diet. Will provide [facility]o wound healing ONS x 2 per day. Encourage/monitor PO intakes as tolerated. Monitor labs, weights, and plan of care. Nutrition Assessment:  Pt admitted with respiratory distress, difficulty breathing, and fatigue. Noted pt recently had pneumonia. PMH includes: CHF, COPD, DM, HTN. Pt with a stage 1 pressure ulcer to her sacrum present. Pt with some nausea today. Reports having a usual good appetite and PO intakes. Labs reviewed: Na 132 mmol/L, Glucose 283-355 mg/dL. Meds include: Lasix, Prednisone. Malnutrition Assessment:  Malnutrition Status: At risk for malnutrition    Context:  Acute Illness     Findings of the 6 clinical characteristics of malnutrition:  Energy Intake:  Mild decrease in energy intake  Weight Loss:  No significant weight loss     Body Fat Loss:  No significant body fat loss   Muscle Mass Loss:  No significant muscle mass loss  Fluid Accumulation:  1 - Mild Extremities   Strength:  Not Performed    Estimated Daily Nutrient Needs:  Energy (kcal):  12-14 kcal/kg = 1830-2839 kcals/day; Weight Used for Energy Requirements:  Current     Protein (g):  1.5-2.0 gm/kg =  gm pro/day; Weight Used for Protein Requirements:  Ideal        Fluid (ml/day):  2300 mL/day or per MD; Method Used for Fluid Requirements: Vahid Choudhury      Nutrition Related Findings:  Labs/Meds reviewed. C/o nausea. Wounds:  Stage I,Pressure Injury (to sacrum)       Current Nutrition Therapies:    ADULT DIET;  Regular; 4 carb choices (60 gm/meal)    Anthropometric Measures:  · Height: 5' 6\" (167.6 cm)  · Current Body Weight: 355 lb (161 kg)   · Admission Body Weight: 355 lb (161 kg)    · Usual Body Weight: 355 lb (161 kg)     · Ideal Body Weight: 130 lbs; % Ideal Body Weight 273.1 %   · BMI: 57.3  · BMI Categories: Obese Class 3 (BMI 40.0 or greater)       Nutrition Diagnosis:   · Increased nutrient needs related to  (healing) as evidenced by wounds (stage 1 pressure ulcer)    Nutrition Interventions:   Food and/or Nutrient Delivery:  Continue Current Diet,Start Oral Nutrition Supplement  Nutrition Education/Counseling:  No recommendation at this time,Education not indicated   Coordination of Nutrition Care:  Continue to monitor while inpatient    Goals:  Meet at least 75% of estimated nutrition needs. Nutrition Monitoring and Evaluation:   Behavioral-Environmental Outcomes:  None Identified   Food/Nutrient Intake Outcomes:  Food and Nutrient Intake,Supplement Intake  Physical Signs/Symptoms Outcomes:  Biochemical Data,GI Status,Fluid Status or Edema,Hemodynamic Status,Nutrition Focused Physical Findings,Skin,Weight     Discharge Planning:     Too soon to determine     Electronically signed by Kasi Case RD, LD on 4/9/22 at 9:56 AM EDT    Contact: [phone]

## 2022-04-09 NOTE — PLAN OF CARE
Problem: Falls - Risk of:  Goal: Will remain free from falls  Description: Will remain free from falls  4/9/2022 1822 by Liana Hassan RN  Outcome: Ongoing  4/9/2022 0509 by Marc Salinas RN  Outcome: Ongoing  Goal: Absence of physical injury  Description: Absence of physical injury  4/9/2022 1822 by Liana Hassan RN  Outcome: Ongoing  4/9/2022 0509 by Marc Salinas RN  Outcome: Ongoing     Problem: Skin Integrity:  Goal: Will show no infection signs and symptoms  Description: Will show no infection signs and symptoms  4/9/2022 1822 by Liana Hassan RN  Outcome: Ongoing  4/9/2022 0509 by Marc Salinas RN  Outcome: Ongoing  Goal: Absence of new skin breakdown  Description: Absence of new skin breakdown  4/9/2022 1822 by Liana Hassan RN  Outcome: Ongoing  4/9/2022 0509 by Marc Salinas RN  Outcome: Ongoing     Problem: Nutrition  Goal: Optimal nutrition therapy  4/9/2022 1822 by Liana Hassan RN  Outcome: Ongoing  4/9/2022 0957 by Heidi Blandon RD, LD  Outcome: Ongoing  Note: Nutrition Problem #1: Increased nutrient needs  Intervention: Food and/or Nutrient Delivery: Continue Current Diet,Start Oral Nutrition Supplement  Nutritional Goals: Meet at least 75% of estimated nutrition needs.

## 2022-04-09 NOTE — FLOWSHEET NOTE
· Bronchodilator assessment   [x]    Bronchodilator Assessment    Pt has a hx of COPD and states that she wears 02 at 7-8L at home. Pt is on 6L NC at this time sats 95%. BS: Diminished/Inspiratory wheeze. RR: 20.  No complications and no signs of distress at this time.    Bronchodilator assessment at level  3  BRONCHODILATOR ASSESSMENT SCORE  Score 1 2 3 4   Breath Sounds   []  Clear []  Mild Wheezing with good aeration [x]  Moderate I/E wheezing with adequate aeration []  Poor Aeration or diffuse wheezing   Respiratory Rate []  Less than 20 [x]  20-25 []  Greater than 25  []  Greater than 35    Dyspnea []  No SOB  [x]  SOB with minimal activity []  Speaking in partial sentences []  Acute/ At rest   Peakflow (asthma) []  80 % or greater predicted/PB  []  Unable []  70% or greater predicted/PB  []  Unable []  51%-70% predicted/PB  []  Unable []  Less than 50% predicted/PB  []  Unable due to distress   FEV1 % Predicted []  Greater than 69%  []  Unable  []  Less than 50%-69%  []  Unable  []  Less than 35%-49%  []  Unable  []  Less than 35%  []  Unable due to distress       Vandana Woodard RCP

## 2022-04-09 NOTE — PLAN OF CARE
BRONCHOSPASM/BRONCHOCONSTRICTION     []         IMPROVE AERATION/BREATH SOUNDS  []   ADMINISTER BRONCHODILATOR THERAPY AS APPROPRIATE  []   ASSESS BREATH SOUNDS  []   IMPLEMENT AEROSOL/MDI PROTOCOL  []   PATIENT EDUCATION AS NEEDED   Anthony Aranda Assessment complete. CHF (congestive heart failure), NYHA class I, acute on chronic, combined (HCC) [I50.43]  Acute on chronic congestive heart failure, unspecified heart failure type ([facility] [place] [address] [I50.9] . Vitals:    04/09/22 0848   BP:    Pulse:    Resp: 22   Temp:    SpO2: (!) 87%   . Patients home meds are   Prior to Admission medications    Medication Sig Start Date End Date Taking? Authorizing Provider   amLODIPine (NORVASC) 5 MG tablet Take 1 tablet by mouth daily 3/28/22   Amna Faye MD   diazePAM (VALIUM) 5 MG tablet Take 5 mg by mouth every 12 hours as needed for Anxiety.     Historical Provider, MD   folic acid (FOLVITE) 1 MG tablet Take 1 mg by mouth daily    Historical Provider, MD   vitamin B-12 (CYANOCOBALAMIN) 1000 MCG tablet Take 1,000 mcg by mouth daily    Historical Provider, MD   Dulaglutide (TRULICITY) 3.63 KS/9.7NG SOPN Inject 0.75 mg into the skin once a week    Historical Provider, MD   metOLazone (ZAROXOLYN) 2.5 MG tablet Take 2.5 mg by mouth every other day    Historical Provider, MD   glipiZIDE (GLUCOTROL XL) 2.5 MG extended release tablet Take 2.5 mg by mouth  Patient not taking: Reported on 4/9/2022    Historical Provider, MD   SM MUCUS RELIEF 600 MG extended release tablet  10/15/19   Historical Provider, MD   nystatin (MYCOSTATIN) 613075 UNIT/GM cream  10/17/19   Historical Provider, MD   Ergocalciferol (VITAMIN D2 PO) Take 50,000 Units by mouth once a week  Patient not taking: Reported on 3/18/2022    Historical Provider, MD   potassium chloride (KLOR-CON M) 10 MEQ extended release tablet Take 1 tablet by mouth daily 7/25/19   Maryuri Bray MD   bumetanide (BUMEX) 1 MG tablet Take 2 mg by mouth 2 times daily    Historical Provider, MD   JANUVIA 50 MG tablet Take 50 mg by mouth daily 6/11/19   Historical Provider, MD   glucose monitoring kit (FREESTYLE) monitoring kit 1 kit by Does not apply route daily 5/8/19   Shanita Ta MD   blood glucose monitor strips Test three  times a day & as needed for symptoms of irregular blood glucose.  5/8/19   Shanita Ta MD   Lancets MISC 1 each by Does not apply route daily 5/8/19   Shanita Ta MD   pantoprazole sodium (PROTONIX) 40 MG PACK packet Take 40 mg by mouth every morning (before breakfast)    Historical Provider, MD   spironolactone (ALDACTONE) 25 MG tablet Take 1 tablet by mouth daily 11/22/18   Bruna Carpio MD   isosorbide dinitrate (ISORDIL) 20 MG tablet Take 1 tablet by mouth 3 times daily 9/20/18   Rachelle Holbrook MD   hydrALAZINE (APRESOLINE) 25 MG tablet Take 1 tablet by mouth every 8 hours 9/20/18   Rachelle Holbrook MD   Blood Pressure KIT 1 Device by Does not apply route 2 times daily 9/20/18   Robert House MD   ferrous sulfate 325 (65 Fe) MG EC tablet Take 325 g by mouth 2 times daily (with meals) 4/20/18   Historical Provider, MD   loratadine (CLARITIN) 10 MG tablet Take 10 mg by mouth daily    Historical Provider, MD   tiotropium (SPIRIVA) 18 MCG inhalation capsule Inhale 1 capsule into the lungs  8/31/17   Historical Provider, MD   sertraline (ZOLOFT) 100 MG tablet Take 100 mg by mouth daily    Historical Provider, MD   atorvastatin (LIPITOR) 40 MG tablet Take 1 tablet by mouth nightly 2/21/18   Oliver Alexis MD   albuterol sulfate  (90 Base) MCG/ACT inhaler Inhale 2 puffs into the lungs every 6 hours as needed for Wheezing    Historical Provider, MD   fluticasone (FLONASE) 50 MCG/ACT nasal spray 1 spray by Nasal route daily     Historical Provider, MD   albuterol (PROVENTIL) (2.5 MG/3ML) 0.083% nebulizer solution Take 3 mLs by nebulization every 6 hours as needed for Wheezing. 9/24/14   Rosy Zuleta MD   budesonide-formoterol [facility] 160-4.5 MCG/ACT AERO Inhale 2 puffs into the lungs 2 times daily.  9/24/14   Gisela Wheeler MD   .  Recent Surgical History: None = 0     Assessment On bipap at HS and prn, 6LPM during day    RR 20  Breath Sounds: clear diminished      Bronchodilator assessment at level  3  Hyperinflation assessment at level   Secretion Management assessment at level      [x]    Bronchodilator Assessment  BRONCHODILATOR ASSESSMENT SCORE  Score 0 1 2 3 4 5   Breath Sounds   []  Patient Baseline []  No Wheeze good aeration []  Faint, scattered wheezing, good aeration [x]  Expiratory Wheezing and or moderately diminished []  Insp/Exp wheeze and/or very diminished []  Insp/Exp and/ or marked distress   Respiratory Rate   []  Patient Baseline []  Less than 20 []  Less than 20 [x]  20-25 []  Greater than 25 []  Greater than 25   Peak flow % of Pred or PB [x]  NA   []  Greater than 90%  []  81-90% []  71-80% []  Less than or equal to 70%  or unable to perform []  Unable due to Respiratory Distress   Dyspnea re []  Patient Baseline []  No SOB []  No SOB [x]  SOB on exertion []  SOB min activity []  At rest/acute   e FEV% Predicted       [x]  NA []  Above 69%  []  Unable []  Above 60-69%  []  Unable []  Above 50-59%  []  Unable []  Above 35-49%  []  Unable []  Less than 35%  []  Unable

## 2022-04-10 NOTE — DISCHARGE INSTR - COC
Continuity of Care Form    Patient Name: Fariba Anderson   :  1975  MRN:  [id number]    Admit date:  2022  Discharge date: 22    Code Status Order: Full Code   Advance Directives:      Admitting Physician:  Nilsa Persaud MD  PCP: James Alanis DO    Discharging Nurse: [facility] Unit/Room#:   Discharging Unit Phone Number: [phone]    Emergency Contact:   Extended Emergency Contact Information  Primary Emergency Contact: Radu Velásquez   [address] Phone: [phone]  Relation: Parent  Secondary Emergency Contact: [facility] Phone: [phone]  Relation: Brother/Sister    Past Surgical History:  Past Surgical History:   Procedure Laterality Date    ABDOMEN SURGERY      Patient had a PEG tube placed 2018, removed 2018    [address] [place] [address]  2019    CYSTOSCOPY N/A 2019    CYSTOSCOPY performed by Mc Marinelli MD at [place] [address]  2018    Removed in 2018    [facility] CATH POWER PICC TRIPLE  2018         JOINT REPLACEMENT      NM OFFICE/OUTPT VISIT,PROCEDURE ONLY N/A 2018    TRACHEOTOMY performed by Bryan Amaya MD at [facility]  2018    TRACHEOTOMY  2018       Immunization History: There is no immunization history on file for this patient.     Active Problems:  Patient Active Problem List   Diagnosis Code    Asthma J45.909    Pneumonia J18.9    HTN (hypertension) I10    Torn meniscus S83.209A    Chest pain R07.9    Pulmonary hypertension, moderate to severe (HCC) I27.20    Morbid obesity with BMI of 50.0-59.9, adult (HCC) E66.01, Z68.43    Obesity hypoventilation syndrome (HCC) E66.2    H/O tracheostomy Z98.890    STEPH (obstructive sleep apnea) G47.33    Right heart failure, unspecified (HCC) I50.810    Acute on chronic diastolic heart failure (HCC) I50.33    Acute on chronic congestive heart failure (HCC) I50.9 Diabetes mellitus, new onset ([facility] [address] E11.9    Dizziness R42    Normocytic anemia D64.9    Pneumonia of both lower lobes due to infectious organism J18.9    NSTEMI (non-ST elevated myocardial infarction) ([facility] [address] I21.4    Acute pulmonary edema ([facility]) J81.0    Hypertensive emergency I16.1    Acute respiratory failure with hypoxia and hypercapnia ([facility]) J96.01, J96.02    Smoker F17.200    Morbid obesity ([facility]) E66.01    SOB (shortness of breath) R06.02    COPD exacerbation ([facility]) J44.1    ARDS (adult respiratory distress syndrome) ([facility]) J80    Fever R50.9    Disease due to rhinovirus B34.8    Encounter for PEG (percutaneous endoscopic gastrostomy) ([facility] [address] Z43.1    Status post tracheostomy ([facility] [address] Z93.0    Status post insertion of percutaneous endoscopic gastrostomy (PEG) tube ([facility] [address] Z93.1    Rhinovirus infection B34.8    Acute non-recurrent pansinusitis J01.40    Chronic respiratory failure ([facility] [address] J96.10    Pulmonary hypertension ([facility] [address] I27.20    Chronic narcotic dependence ([facility] [address] F11.20    Chronically on benzodiazepine therapy Z79.899    Neuropathy G62.9    Epigastric pain R10.13    Muscle spasm M62.838    Dyspnea R06.00    Prediabetes R73.03    Hyperlipidemia E78.5    Hypokalemia E87.6    Hypomagnesemia E83.42    CHF (congestive heart failure), NYHA class I, acute on chronic, combined ([facility]) I50.43       Isolation/Infection:   Isolation            No Isolation          Patient Infection Status       Infection Onset Added Last Indicated Last Indicated By Review Planned Expiration Resolved Resolved By    None active    Resolved    COVID-19 (Rule Out) 04/08/22 04/08/22 04/08/22 COVID-19, Rapid (Ordered)   04/08/22 Rule-Out Test Resulted    COVID-19 (Rule Out) 03/19/22 03/19/22 03/19/22 COVID-19 (Ordered)   03/20/22 Rule-Out Test Resulted    COVID-19 (Rule Out) 03/18/22 03/18/22 03/18/22 COVID-19, Rapid (Ordered)   03/18/22 Rule-Out Test Resulted            Nurse Assessment:  Last Vital Signs: /75   Pulse 95   Temp 97.6 °F (36.4 °C) (Oral)   Resp 15   Ht 5' 6\" (1.676 m)   Wt (!) 355 lb (161 kg)   SpO2 (!) 89%   BMI 57.30 kg/m²     Last documented pain score (0-10 scale): Pain Level: 5  Last Weight:   Wt Readings from Last 1 Encounters:   04/09/22 (!) 355 lb (161 kg)     Mental Status:  oriented, alert, coherent, logical, thought processes intact, and able to concentrate and follow conversation    IV Access:  - None    Nursing Mobility/ADLs:  Walking   Assisted  Transfer  Assisted  Bathing  Assisted  Dressing  Assisted  Toileting  Assisted  Feeding  [address]  Assisted  Med Delivery   whole    Wound Care Documentation and Therapy:        Elimination:  Continence: Bowel: Yes  Bladder: Yes  Urinary Catheter: None   Colostomy/Ileostomy/Ileal Conduit: No       Date of Last BM: 4/25/22    Intake/Output Summary (Last 24 hours) at 4/10/2022 1203  Last data filed at 4/10/2022 0400  Gross per 24 hour   Intake 3240 ml   Output 1150 ml   Net 2090 ml     I/O last 3 completed shifts: In: 7177 [P.O.:3720; IV Piggyback:50]  Out: 1800 [Urine:1800]    Safety Concerns: At Risk for Falls    Impairments/Disabilities:      Vision- wears glasses    Nutrition Therapy:  Current Nutrition Therapy:   - Oral Diet:  Carb Control 4 carbs/meal (1800kcals/day)    Routes of Feeding: Oral  Liquids: Thin Liquids  Daily Fluid Restriction: no  Last Modified Barium Swallow with Video (Video Swallowing Test): not done    Treatments at the Time of Hospital Discharge:   Respiratory Treatments: 6L  Oxygen Therapy:  is on oxygen at 6 L/min per nasal cannula.   Ventilator:    - CPAP   only when sleeping    Rehab Therapies: Physical Therapy and Occupational Therapy  Weight Bearing Status/Restrictions: No weight bearing restrictions  Other Medical Equipment (for information only, NOT a DME order):  walker and bedside commode  Other Treatments: skilled nursing    Patient's personal belongings (please select all that are sent with patient):  Daniel FARLEY SIGNATURE:  Electronically signed by Abram Cat RN on 4/26/22 at 3:36 PM EDT    CASE MANAGEMENT/SOCIAL WORK SECTION    Inpatient Status Date: 4/8/22    Readmission Risk Assessment Score:  Readmission Risk              Risk of Unplanned Readmission:  37           Discharging to Facility/ Agency   Name: [facility]  Address:   [address] [address]         Phone: [phone]          / signature: Electronically signed by Wilner Perez RN on 4/26/22 at 4:07 PM EDT    PHYSICIAN SECTION    Prognosis: Fair    Condition at Discharge: Stable    Rehab Potential (if transferring to Rehab): Good    Recommended Labs or Other Treatments After Discharge:follow up with PCP and Pulmonologist    Physician Certification: I certify the above information and transfer of Fariba Anderson  is necessary for the continuing treatment of the diagnosis listed and that she requires home care for greater 30 days.      Update Admission H&P: No change in H&P    PHYSICIAN SIGNATURE:  Electronically signed by MD Marcos on 4/26/2022 at 1:17 PM

## 2022-04-10 NOTE — PLAN OF CARE
Problem: Falls - Risk of:  Goal: Will remain free from falls  Description: Will remain free from falls  4/10/2022 0024 by Aleida Rodriguez RN  Outcome: Ongoing  4/9/2022 1822 by Leanna Joy RN  Outcome: Ongoing  Goal: Absence of physical injury  Description: Absence of physical injury  4/10/2022 0024 by Aleida Rodriguez RN  Outcome: Ongoing  4/9/2022 1822 by Leanna Joy RN  Outcome: Ongoing     Problem: Skin Integrity:  Goal: Will show no infection signs and symptoms  Description: Will show no infection signs and symptoms  4/10/2022 0024 by Aleida Rodriguez RN  Outcome: Ongoing  4/9/2022 1822 by Leanna Joy RN  Outcome: Ongoing  Goal: Absence of new skin breakdown  Description: Absence of new skin breakdown  4/10/2022 0024 by Aleida Rodriguez RN  Outcome: Ongoing  4/9/2022 1822 by Leanna Joy RN  Outcome: Ongoing     Problem: Nutrition  Goal: Optimal nutrition therapy  4/10/2022 0024 by Aleida Rodriguez RN  Outcome: Ongoing  4/9/2022 1822 by Leanna Joy RN  Outcome: Ongoing

## 2022-04-10 NOTE — PROGRESS NOTES
Informed Dr. Ann Lyles (internal medicine resident) of patient complaining of chest pain along with requiring higher level of oxygen via nasal cannula and requesting an order for hi-flow nasal cannula. Will continue to monitor.

## 2022-04-10 NOTE — PROGRESS NOTES
[facility]  Internal Medicine Teaching Residency Program  Inpatient Daily Progress Note  ______________________________________________________________________________    Patient: Orly Ochoa  YOB: 1975   QEZ:[id number]    Acct: [de-identified]     Room: 2004/2004-01  Admit date: 4/8/2022  Today's date: 04/10/22  Number of days in the hospital: 2    SUBJECTIVE   Admitting Diagnosis: CHF (congestive heart failure), NYHA class I, acute on chronic, combined ([facility] [address]  CC: SOB  Pt examined at bedside. Chart & results reviewed. No acute events reported as per RN  Patient complained of mild chest pain this morning along with Hypoxia. currently saturating at 95. Blood pressures have been stable  Patient has drank 2.5 L in the last 24 hours. Admitted with CHF exacerbation, currently on IV Lasix 80 mg IV twice daily. Did receive this morning. We will get a chest x-ray to look for any worsening pulmonary edema. Mild edema bilateral lower extremities noted. Labs Descalated, prednisone discontinued  Denies any febrile episodes  Denies any other complaints    ROS:  Constitutional:  negative for chills, fevers, sweats  Respiratory:  negative for cough, dyspnea on exertion, hemoptysis,  wheezing  Cardiovascular: lower extremity edema, palpitations  Gastrointestinal:  negative for abdominal pain, constipation, diarrhea, nausea, vomiting  Neurological:  negative for dizziness, headache    BRIEF HISTORY     The patient is a pleasant 55 y.o. 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 Patient states she has had increased shortness of breath the past 2 nights, unable to wear her CPAP nightly with increased fatigue. Patient is poor historian, states she wears 7 L O2 at home, however EMS on arrival saw patient on 4 L and was saturating in the mid 80s.   Patient was placed on nonrebreather by EMS and saturations improved to 98%.     Of note, patient had previous hospitalization earlier this month for COPD exacerbation complicated by pneumonia. At that time patient was treated with antibiotics and steroids. Pulmonology was consulted and patient was approved for trilogy noninvasive ventilator which she has yet to receive. Echocardiogram at that time showed EF 60%, \"D\" sign indicating RV pressure overload, moderate to severe MR, severe pulmonary hypertension. Patient is aware of these findings.     On my evaluation, patient resting comfortably on 15 L nonrebreather mask acute distress. Patient is morbidly obese with BMI 57. Denies any cough, chest pain, leg pain/swelling. She does have skin irritation on her buttocks. States she has been compliant with all meds (inculding bronchodilators and bumex 2mg BID) except for her PO DM meds as those were held on previous admission. Afebrile, hemodynamic stable, tachycardic in the 110s at present. Patient received prednisone and lasix 40mg IV in ED as well as rocephin and vancomycin. CXR in ED - limited negative CXR d/t body habitus.     Significant labs include proBNP on admission 4192, was in 1000s on recent discharge. Leukocytosis of 17.0. Hemoglobin 9.7.   Slight bump in creatinine from baseline- 1.21. glucose 238.       4/10/2022-episode of hypoxia, patient drank 3.5 L of fluid admitted with CHF exacerbation, getting a chest x-ray, IV diuretics to continue, labs de-escalate, prednisone discontinued    OBJECTIVE     Vital Signs:  BP (!) 110/53   Pulse 86   Temp 97.4 °F (36.3 °C) (Axillary)   Resp 11   Ht 5' 6\" (1.676 m)   Wt (!) 355 lb (161 kg)   SpO2 91%   BMI 57.30 kg/m²     Temp (24hrs), Av.9 °F (36.6 °C), Min:97.4 °F (36.3 °C), Max:98.3 °F (36.8 °C)    In: 3720   Out: 1800 [Urine:1800]    Physical Exam:  Constitutional: This is a well developed, well nourished, Greater than 40 - Morbid Obesity / Extreme Obesity / Grade III 55y.o. year old female who is alert, oriented, cooperative and in no apparent distress. Head:normocephalic and atraumatic. EENT:  PERRLA. No conjunctival injections. Septum was midline, mucosa was without erythema, exudates or cobblestoning. No thrush was noted. Neck: Supple without thyromegaly. No elevated JVP. Trachea was midline. Respiratory: Due to body habitus auscultation is very limited and mild diminished breath sounds bilaterally. No wheezing. Cardiovascular: Regular without murmur, clicks, gallops or rubs. Abdomen: Slightly rounded and soft without organomegaly. No rebound, rigidity or guarding was appreciated. Lymphatic: No lymphadenopathy. Musculoskeletal: Normal curvature of the spine. No gross muscle weakness. Extremities: Mild bilateral lower extremity edema  Skin:  Warm and dry. Good color, turgor and pigmentation. No lesions or scars.   No cyanosis or clubbing  Neurological/Psychiatric: The patient's general behavior, level of consciousness, thought content and emotional status is normal.        Medications:  Scheduled Medications:    amLODIPine  5 mg Oral Daily    atorvastatin  40 mg Oral Nightly    budesonide-formoterol  2 puff Inhalation BID    furosemide  80 mg IntraVENous BID    ferrous sulfate  325 mg Oral BID     folic acid  1 mg Oral Daily    hydrALAZINE  25 mg Oral 3 times per day    isosorbide dinitrate  20 mg Oral TID    pantoprazole  40 mg Oral QAM AC    spironolactone  25 mg Oral Daily    tiotropium  2 puff Inhalation Daily    vitamin B-12  1,000 mcg Oral Daily    sodium chloride flush  10 mL IntraVENous 2 times per day    heparin (porcine)  5,000 Units SubCUTAneous 3 times per day    insulin lispro  0-12 Units SubCUTAneous TID     insulin lispro  0-6 Units SubCUTAneous Nightly    gabapentin  300 mg Oral TID     Continuous Infusions:    sodium chloride Stopped (04/09/22 0637)    dextrose       PRN Medicationsalbuterol sulfate HFA, 2 puff, Q6H PRN  diazePAM, 5 mg, Q12H PRN  sodium chloride flush, 10 mL, PRN  sodium chloride, , PRN  magnesium sulfate, 1,000 mg, PRN  ondansetron, 4 mg, Q8H PRN   Or  ondansetron, 4 mg, Q6H PRN  polyethylene glycol, 17 g, Daily PRN  glucose, 15 g, PRN  dextrose, 12.5 g, PRN  glucagon (rDNA), 1 mg, PRN  dextrose, 100 mL/hr, PRN  albuterol, 2.5 mg, As Directed RT PRN  oxyCODONE-acetaminophen, 1 tablet, Q8H PRN        Diagnostic Labs:  CBC:   Recent Labs     04/08/22  1844 04/09/22  0619 04/10/22  0508   WBC 17.0* 12.9* 11.9*   RBC 3.75* 3.55* 3.43*   HGB 9.7* 9.1* 8.7*   HCT 33.2* 31.1* 30.6*   MCV 88.5 87.6 89.2   RDW 15.9* 16.1* 16.0*    240 215     BMP:   Recent Labs     04/08/22  1844 04/09/22  0619 04/10/22  0508   * 132* 134*   K 4.2 4.3 4.0   CL 96* 94* 96*   CO2 27 25 28   BUN 27* 26* 31*   CREATININE 1.21* 1.12* 1.12*     BNP: No results for input(s): BNP in the last 72 hours. PT/INR: No results for input(s): PROTIME, INR in the last 72 hours. APTT: No results for input(s): APTT in the last 72 hours. CARDIAC ENZYMES: No results for input(s): CKMB, CKMBINDEX, TROPONINI in the last 72 hours. Invalid input(s): CKTOTAL;3  FASTING LIPID PANEL:  Lab Results   Component Value Date    CHOL 144 11/17/2018    HDL 42 10/07/2020    TRIG 68 11/17/2018     LIVER PROFILE:   Recent Labs     04/08/22 1844   AST 34*   ALT 57*   BILITOT 0.86   ALKPHOS 123*      MICROBIOLOGY:   Lab Results   Component Value Date/Time    CULTURE NO GROWTH 1 DAY 04/09/2022 02:22 AM       Imaging:    XR CHEST PORTABLE    Result Date: 4/8/2022  Limited negative portable chest radiograph.        ASSESSMENT & PLAN     ASSESSMENT / PLAN:     Principal Problem:    CHF (congestive heart failure), NYHA class I, acute on chronic, combined ([facility] [address]  Active Problems:    Asthma    HTN (hypertension)    Acute respiratory failure with hypoxia and hypercapnia (HCC)    Morbid obesity with BMI of 50.0-59.9, adult (HCC)    Obesity hypoventilation syndrome (HCC)    STEPH (obstructive sleep apnea)    Pulmonary hypertension (HCC)    Normocytic anemia    Prediabetes    Hyperlipidemia  Resolved Problems:    * No resolved hospital problems. *      Acute hypoxic respiratory failure likely secondary to pulmonary edema with underlying  chronic congestive heart failure  -Continue with IV diuresis furosemide 80 mg IV twice daily will change to oral tomorrow based on response  -Follow-up with chest x-ray for acute hypoxic episode this morning continue with high flow oxygen patient is a 7 L of nasal cannula oxygen at home  -Continue with BiPAP overnight  -Strict I&O's, fluid restriction to 1.5 L    History of asthma -continue with home bronchodilators, discontinue steroids today. STEPH/OHS- bipap nightly    Essential HTN  Continue home meds  Normocytic anemia follow-up with iron panel   pre-DM  a1c 6.4  Med dose sliding scale  Patient was not taking PO meds  POCT glucose 4xdaily    DVT ppx: heparin  GI ppx: protonix   Diet: Regular    PT/OT/SW : On board. Discharge Planning:  Likely need SNF placement discharge planning in progress    Kala Yousif MD  Internal Medicine Resident, PGY-2  [facility]; [place], [place]  4/10/2022, 8:31 AM    Attestation and add on       I have discussed the care of Niya Bauer , including pertinent history and exam findings,      4/10/22    with the resident. I have seen and examined the patient and the key elements of all parts of the encounter have been performed by me . I agree with the assessment, plan and orders as documented by the resident.      Principal Problem:    CHF (congestive heart failure), NYHA class I, acute on chronic, combined ([facility] [address]  Active Problems:    Asthma    HTN (hypertension)    Acute respiratory failure with hypoxia and hypercapnia (HCC)    Morbid obesity with BMI of 50.0-59.9, adult (HCC)    Obesity hypoventilation syndrome (HCC)    STEPH (obstructive sleep apnea)    Pulmonary hypertension (HCC)    Normocytic anemia    Prediabetes    Hyperlipidemia  Resolved Problems:    * No resolved hospital problems. *        '''''still wheezing . Treatment optimized''tiotrotium added'''    Medications: Allergies: Allergies   Allergen Reactions    Bee Venom Anaphylaxis     Last bee sting when patient was at 11years of age   Randolph Sulfa Antibiotics Anaphylaxis    Asa [Aspirin]     Aspirin Other (See Comments)     HEART PALPATATIONS      Beta Adrenergic Blockers Other (See Comments)     Asystole and Bradycardia on admission  01/26/2018-2/22/2018 at [facility]    Beta Adrenergic Blockers Other (See Comments)     UNKNOWN      Sulfa Antibiotics        Current Meds:   Scheduled Meds:    amLODIPine  5 mg Oral Daily    atorvastatin  40 mg Oral Nightly    budesonide-formoterol  2 puff Inhalation BID    furosemide  80 mg IntraVENous BID    ferrous sulfate  325 mg Oral BID WC    folic acid  1 mg Oral Daily    hydrALAZINE  25 mg Oral 3 times per day    isosorbide dinitrate  20 mg Oral TID    pantoprazole  40 mg Oral QAM AC    spironolactone  25 mg Oral Daily    tiotropium  2 puff Inhalation Daily    vitamin B-12  1,000 mcg Oral Daily    sodium chloride flush  10 mL IntraVENous 2 times per day    heparin (porcine)  5,000 Units SubCUTAneous 3 times per day    insulin lispro  0-12 Units SubCUTAneous TID WC    insulin lispro  0-6 Units SubCUTAneous Nightly    gabapentin  300 mg Oral TID     Continuous Infusions:    sodium chloride Stopped (04/09/22 0637)    dextrose       PRN Meds: albuterol sulfate HFA, diazePAM, sodium chloride flush, sodium chloride, magnesium sulfate, ondansetron **OR** ondansetron, polyethylene glycol, glucose, dextrose, glucagon (rDNA), dextrose, albuterol, oxyCODONE-acetaminophen         MD KENNETH Joyce [address], [address] [place] [address].    Phone [phone]   Fax: [phone]  Answering Service: [phone]

## 2022-04-11 NOTE — PROGRESS NOTES
[facility]  Occupational Therapy Not Seen Note    DATE: 2022    NAME: Hamlet Patricio  MRN: [id number]   : 1975      Patient not seen this date for Occupational Therapy due to:    Patient is not appropriate for active participation in OT evaluation/treatment at this time d/t O2 sat at 90% on high BIPAP. RN requesting check back at this time.      Next Scheduled Treatment:  Check back     Electronically signed by Bekah Faye S/OT on 2022 at 12:21 PM

## 2022-04-11 NOTE — PLAN OF CARE
[facility]. [facility] (congestive heart failure), NYHA class I, acute on chronic, combined ([facility]) [I50.43]  Acute on chronic congestive heart failure, unspecified heart failure type [facility] [I50.9] . Vitals:    04/11/22 1020   BP:    Pulse: 106   Resp: 22   Temp:    SpO2: 95%   . Patients home meds are   Prior to Admission medications    Medication Sig Start Date End Date Taking? Authorizing Provider   amLODIPine (NORVASC) 5 MG tablet Take 1 tablet by mouth daily 3/28/22   Suzy Shaw MD   diazePAM (VALIUM) 5 MG tablet Take 5 mg by mouth every 12 hours as needed for Anxiety.     Historical Provider, MD   folic acid (FOLVITE) 1 MG tablet Take 1 mg by mouth daily    Historical Provider, MD   vitamin B-12 (CYANOCOBALAMIN) 1000 MCG tablet Take 1,000 mcg by mouth daily    Historical Provider, MD   Dulaglutide (TRULICITY) 8.00 LF/0.7MV SOPN Inject 0.75 mg into the skin once a week    Historical Provider, MD   metOLazone (ZAROXOLYN) 2.5 MG tablet Take 2.5 mg by mouth every other day    Historical Provider, MD   glipiZIDE (GLUCOTROL XL) 2.5 MG extended release tablet Take 2.5 mg by mouth  Patient not taking: Reported on 4/9/2022    Historical Provider, MD   SM MUCUS RELIEF 600 MG extended release tablet  10/15/19   Historical Provider, MD   nystatin (MYCOSTATIN) 799507 UNIT/GM cream  10/17/19   Historical Provider, MD   Ergocalciferol (VITAMIN D2 PO) Take 50,000 Units by mouth once a week  Patient not taking: Reported on 3/18/2022    Historical Provider, MD   potassium chloride (KLOR-CON M) 10 MEQ extended release tablet Take 1 tablet by mouth daily 7/25/19   Lara Morrison MD   bumetanide (BUMEX) 1 MG tablet Take 2 mg by mouth 2 times daily    Historical Provider, MD   JANUVIA 50 MG tablet Take 50 mg by mouth daily 6/11/19   Historical Provider, MD   glucose monitoring kit (FREESTYLE) monitoring kit 1 kit by Does not apply route daily 5/8/19   Darinel Aguilar MD   blood glucose monitor strips Test three  times a day & as needed for symptoms of irregular blood glucose. 5/8/19   [address], MD   Lancets MISC 1 each by Does not apply route daily 5/8/19   Neda0 Mely Goddard MD   pantoprazole sodium (PROTONIX) 40 MG PACK packet Take 40 mg by mouth every morning (before breakfast)    Historical Provider, MD   spironolactone (ALDACTONE) 25 MG tablet Take 1 tablet by mouth daily 11/22/18   Carri Kuhn MD   isosorbide dinitrate (ISORDIL) 20 MG tablet Take 1 tablet by mouth 3 times daily 9/20/18   Jennifer Vasquez MD   hydrALAZINE (APRESOLINE) 25 MG tablet Take 1 tablet by mouth every 8 hours 9/20/18   Jennifer Vasquez MD   Blood Pressure KIT 1 Device by Does not apply route 2 times daily 9/20/18   Jose Bowen MD   ferrous sulfate 325 (65 Fe) MG EC tablet Take 325 g by mouth 2 times daily (with meals) 4/20/18   Historical Provider, MD   loratadine (CLARITIN) 10 MG tablet Take 10 mg by mouth daily    Historical Provider, MD   tiotropium (SPIRIVA) 18 MCG inhalation capsule Inhale 1 capsule into the lungs  8/31/17   Historical Provider, MD   sertraline (ZOLOFT) 100 MG tablet Take 100 mg by mouth daily    Historical Provider, MD   atorvastatin (LIPITOR) 40 MG tablet Take 1 tablet by mouth nightly 2/21/18   iRcco Mae MD   albuterol sulfate  (90 Base) MCG/ACT inhaler Inhale 2 puffs into the lungs every 6 hours as needed for Wheezing    Historical Provider, MD   fluticasone (FLONASE) 50 MCG/ACT nasal spray 1 spray by Nasal route daily     Historical Provider, MD   albuterol (PROVENTIL) (2.5 MG/3ML) 0.083% nebulizer solution Take 3 mLs by nebulization every 6 hours as needed for Wheezing. 9/24/14   Olena Marshall MD   budesonide-formoterol (SYMBICORT) 160-4.5 MCG/ACT AERO Inhale 2 puffs into the lungs 2 times daily.  9/24/14   Ju Gamble MD   .  Recent Surgical History: None = 0     Assessment     Peak Flow (asthma only)    Predicted: 381  Personal Best: NA  PEF   % Predicted   Peak Flow :     FEV1/FVC    FEV1 Predicted 2.94      FEV1 NA    FEV1 % Predicted   FVC   IS volume   IBW 59.3 kg    RR 22  Breath Sounds: clear      Bronchodilator assessment at level  3  Hyperinflation assessment at level   Secretion Management assessment at level      [x]    Bronchodilator Assessment  BRONCHODILATOR ASSESSMENT SCORE  Score 0 1 2 3 4 5   Breath Sounds   []  Patient Baseline [x]  No Wheeze good aeration []  Faint, scattered wheezing, good aeration []  Expiratory Wheezing and or moderately diminished []  Insp/Exp wheeze and/or very diminished []  Insp/Exp and/ or marked distress   Respiratory Rate   []  Patient Baseline []  Less than 20 []  Less than 20 [x]  20-25 []  Greater than 25 []  Greater than 25   Peak flow % of Pred or PB []  NA   []  Greater than 90%  []  81-90% []  71-80% []  Less than or equal to 70%  or unable to perform []  Unable due to Respiratory Distress   Dyspnea re []  Patient Baseline []  No SOB []  No SOB [x]  SOB on exertion []  SOB min activity []  At rest/acute   e FEV% Predicted       []  NA []  Above 69%  []  Unable []  Above 60-69%  []  Unable []  Above 50-59%  []  Unable []  Above 35-49%  []  Unable []  Less than 35%  []  Unable                 []  Hyperinflation Assessment  Score 1 2 3   CXR and Breath Sounds   []  Clear []  No atelectasis  Basilar aeration []  Atelectasis or absent basilar breath sounds   Incentive Spirometry Volume  (Per IBW)   []  Greater than or equal to 15ml/Kg []  less than 15ml/Kg []  less than 15ml/Kg   Surgery within last 2 weeks []  None or general   []  Abdominal or thoracic surgery  []  Abdominal or thoracic   Chronic Pulmonary Historyre []  No []  Yes []  Yes     []  Secretion Management Assessment  Score 1 2 3   Bilateral Breath Sounds   []  Occasional Rhonchi []  Scattered Rhonchi []  Course Rhonchi and/or poor aeration   Sputum    []  Small amount of thin secretions []  Moderate amount of viscous secretions []  [place], [phone]

## 2022-04-11 NOTE — PLAN OF CARE
Problem: Falls - Risk of:  Goal: Will remain free from falls  Description: Will remain free from falls  4/11/2022 1007 by Star Lora RN  Outcome: Ongoing  4/11/2022 0102 by Monty Cheng RN  Outcome: Ongoing  Goal: Absence of physical injury  Description: Absence of physical injury  4/11/2022 1007 by Star Lora RN  Outcome: Ongoing  4/11/2022 0102 by Monty Cheng RN  Outcome: Ongoing     Problem: Skin Integrity:  Goal: Will show no infection signs and symptoms  Description: Will show no infection signs and symptoms  4/11/2022 1007 by Star Lora RN  Outcome: Ongoing  4/11/2022 0102 by Monty Cheng RN  Outcome: Ongoing  Goal: Absence of new skin breakdown  Description: Absence of new skin breakdown  4/11/2022 1007 by Star Lora RN  Outcome: Ongoing  4/11/2022 0102 by Monty Cheng RN  Outcome: Ongoing     Problem: Nutrition  Goal: Optimal nutrition therapy  4/11/2022 1007 by Star Lora RN  Outcome: Ongoing  4/11/2022 0102 by Monty Cheng RN  Outcome: Ongoing     Problem: Pain:  Goal: Pain level will decrease  Description: Pain level will decrease  4/11/2022 1007 by Star Lora RN  Outcome: Ongoing  4/11/2022 0102 by Monty Cheng RN  Outcome: Ongoing  Goal: Control of acute pain  Description: Control of acute pain  4/11/2022 1007 by Star Lora RN  Outcome: Ongoing  4/11/2022 0102 by Monty Cheng RN  Outcome: Ongoing  Goal: Control of chronic pain  Description: Control of chronic pain  4/11/2022 1007 by Star Lora RN  Outcome: Ongoing  4/11/2022 0102 by Monty Cheng RN  Outcome: Ongoing

## 2022-04-11 NOTE — PROGRESS NOTES
[facility]  Internal Medicine Teaching Residency Program  Inpatient Daily Progress Note  ______________________________________________________________________________    Patient: Ama Byers  YOB: 1975   RUX:[id number]    Acct: [de-identified]     Room: 2004/2004-01  Admit date: 4/8/2022  Today's date: 04/11/22  Number of days in the hospital: 3    SUBJECTIVE   Admitting Diagnosis: CHF (congestive heart failure), NYHA class I, acute on chronic, combined ([facility] [address]  CC: SOB  Pt examined at bedside. Chart & results reviewed. No acute events overnight. Patient is hemodynamically stable and saturating well on heated high flow nasal cannula  Patient did complain of chest pain while coughing this a.m. Temperature 97.5, RR 15, /78, SPO2 92% on BiPAP    ROS:  Constitutional:  negative for chills, fevers, sweats  Respiratory:  negative for cough, dyspnea on exertion, hemoptysis,  wheezing  Cardiovascular: lower extremity edema, palpitations  Gastrointestinal:  negative for abdominal pain, constipation, diarrhea, nausea, vomiting  Neurological:  negative for dizziness, headache    BRIEF HISTORY     The patient is a pleasant 55 y.o. 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 Patient states she has had increased shortness of breath the past 2 nights, unable to wear her CPAP nightly with increased fatigue. Patient is poor historian, states she wears 7 L O2 at home, however EMS on arrival saw patient on 4 L and was saturating in the mid 80s. Patient was placed on nonrebreather by EMS and saturations improved to 98%.     Of note, patient had previous hospitalization earlier this month for COPD exacerbation complicated by pneumonia. At that time patient was treated with antibiotics and steroids.   Pulmonology was consulted and patient was approved for trilogy noninvasive ventilator which she has yet to receive. Echocardiogram at that time showed EF 60%, \"D\" sign indicating RV pressure overload, moderate to severe MR, severe pulmonary hypertension. Patient is aware of these findings.     On my evaluation, patient resting comfortably on 15 L nonrebreather mask acute distress. Patient is morbidly obese with BMI 57. Denies any cough, chest pain, leg pain/swelling. She does have skin irritation on her buttocks. States she has been compliant with all meds (inculding bronchodilators and bumex 2mg BID) except for her PO DM meds as those were held on previous admission. Afebrile, hemodynamic stable, tachycardic in the 110s at present. Patient received prednisone and lasix 40mg IV in ED as well as rocephin and vancomycin. CXR in ED - limited negative CXR d/t body habitus.     Significant labs include proBNP on admission 4192, was in 1000s on recent discharge. Leukocytosis of 17.0. Hemoglobin 9.7. Slight bump in creatinine from baseline- 1.21. glucose 238.       4/10/2022-episode of hypoxia, patient drank 3.5 L of fluid admitted with CHF exacerbation, getting a chest x-ray, IV diuretics to continue, labs de-escalate, prednisone discontinued. 2022 - Patient has drank 2.5 L in the last 24 hours. Admitted with CHF exacerbation, currently on IV Lasix 80 mg IV twice daily. Did receive this morning. We will get a chest x-ray to look for any worsening pulmonary edema. Mild edema bilateral lower extremities noted. Labs Descalated, prednisone discontinued      OBJECTIVE     Vital Signs:  /78   Pulse 77   Temp 97.5 °F (36.4 °C) (Axillary)   Resp 15   Ht 5' 6\" (1.676 m)   Wt (!) 355 lb (161 kg)   SpO2 91%   BMI 57.30 kg/m²     Temp (24hrs), Av.9 °F (36.6 °C), Min:97.5 °F (36.4 °C), Max:98.5 °F (36.9 °C)    In: 250   Out: 1200 [Urine:1200]    Physical Exam:  Constitutional: This is a well developed, well nourished, Greater than 40 - Morbid Obesity / Extreme Obesity / Grade III 55 y.o. year old female who is alert, oriented, cooperative and in no apparent distress. Head:normocephalic and atraumatic. EENT:  PERRLA. No conjunctival injections. Septum was midline, mucosa was without erythema, exudates or cobblestoning. No thrush was noted. Neck: Supple without thyromegaly. No elevated JVP. Trachea was midline. Respiratory: Due to body habitus auscultation is very limited and mild diminished breath sounds bilaterally. No wheezing. Cardiovascular: Regular without murmur, clicks, gallops or rubs. Abdomen: Slightly rounded and soft without organomegaly. No rebound, rigidity or guarding was appreciated. Lymphatic: No lymphadenopathy. Musculoskeletal: Normal curvature of the spine. No gross muscle weakness. Extremities: Mild bilateral lower extremity edema  Skin:  Warm and dry. Good color, turgor and pigmentation. No lesions or scars.   No cyanosis or clubbing  Neurological/Psychiatric: The patient's general behavior, level of consciousness, thought content and emotional status is normal.        Medications:  Scheduled Medications:    ipratropium-albuterol  1 ampule Inhalation 4x daily    sertraline  100 mg Oral Daily    insulin glargine  10 Units SubCUTAneous Nightly    amLODIPine  5 mg Oral Daily    atorvastatin  40 mg Oral Nightly    budesonide-formoterol  2 puff Inhalation BID    furosemide  80 mg IntraVENous BID    ferrous sulfate  325 mg Oral BID WC    folic acid  1 mg Oral Daily    hydrALAZINE  25 mg Oral 3 times per day    isosorbide dinitrate  20 mg Oral TID    pantoprazole  40 mg Oral QAM AC    spironolactone  25 mg Oral Daily    tiotropium  2 puff Inhalation Daily    vitamin B-12  1,000 mcg Oral Daily    sodium chloride flush  10 mL IntraVENous 2 times per day    heparin (porcine)  5,000 Units SubCUTAneous 3 times per day    insulin lispro  0-12 Units SubCUTAneous TID     insulin lispro  0-6 Units SubCUTAneous Nightly    gabapentin  300 mg Oral TID Continuous Infusions:    sodium chloride Stopped (04/09/22 0637)    dextrose       PRN MedicationsoxyCODONE-acetaminophen, 1 tablet, Q6H PRN  albuterol sulfate HFA, 2 puff, Q6H PRN  diazePAM, 5 mg, Q12H PRN  sodium chloride flush, 10 mL, PRN  sodium chloride, , PRN  magnesium sulfate, 1,000 mg, PRN  ondansetron, 4 mg, Q8H PRN   Or  ondansetron, 4 mg, Q6H PRN  polyethylene glycol, 17 g, Daily PRN  glucose, 15 g, PRN  dextrose, 12.5 g, PRN  glucagon (rDNA), 1 mg, PRN  dextrose, 100 mL/hr, PRN  albuterol, 2.5 mg, As Directed RT PRN        Diagnostic Labs:  CBC:   Recent Labs     04/08/22  1844 04/09/22  0619 04/10/22  0508   WBC 17.0* 12.9* 11.9*   RBC 3.75* 3.55* 3.43*   HGB 9.7* 9.1* 8.7*   HCT 33.2* 31.1* 30.6*   MCV 88.5 87.6 89.2   RDW 15.9* 16.1* 16.0*    240 215     BMP:   Recent Labs     04/08/22  1844 04/09/22  0619 04/10/22  0508   * 132* 134*   K 4.2 4.3 4.0   CL 96* 94* 96*   CO2 27 25 28   BUN 27* 26* 31*   CREATININE 1.21* 1.12* 1.12*     BNP: No results for input(s): BNP in the last 72 hours. PT/INR: No results for input(s): PROTIME, INR in the last 72 hours. APTT: No results for input(s): APTT in the last 72 hours. CARDIAC ENZYMES: No results for input(s): CKMB, CKMBINDEX, TROPONINI in the last 72 hours. Invalid input(s): CKTOTAL;3  FASTING LIPID PANEL:  Lab Results   Component Value Date    CHOL 144 11/17/2018    HDL 42 10/07/2020    TRIG 68 11/17/2018     LIVER PROFILE:   Recent Labs     04/08/22 1844   AST 34*   ALT 57*   BILITOT 0.86   ALKPHOS 123*      MICROBIOLOGY:   Lab Results   Component Value Date/Time    CULTURE NO GROWTH 2 DAYS 04/09/2022 02:22 AM       Imaging:    XR CHEST PORTABLE    Result Date: 4/8/2022  Limited negative portable chest radiograph.        ASSESSMENT & PLAN     ASSESSMENT / PLAN:     Principal Problem:    CHF (congestive heart failure), NYHA class I, acute on chronic, combined ([facility] [address]  Active Problems:    Asthma    HTN (hypertension)    Acute respiratory failure with hypoxia and hypercapnia (HCC)    Morbid obesity with BMI of 50.0-59.9, adult (HCC)    Obesity hypoventilation syndrome (HCC)    STEPH (obstructive sleep apnea)    Pulmonary hypertension (HCC)    Normocytic anemia    Prediabetes    Hyperlipidemia  Resolved Problems:    * No resolved hospital problems. *      Acute hypoxic respiratory failure likely secondary to pulmonary edema with underlying  chronic congestive heart failure  -Continue with IV diuresis furosemide 80 mg IV twice daily will change to oral tomorrow based on response  -Follow-up with chest x-ray for acute hypoxic episode this morning continue with high flow oxygen patient is a 7 L of nasal cannula oxygen at home  -Continue with BiPAP overnight  -Strict I&O's, fluid restriction to 1.5 L    History of asthma -continue with home bronchodilators, discontinue steroids today. STEPH/OHS- bipap nightly    Essential HTN  Continue home meds  Normocytic anemia follow-up with iron panel   pre-DM  a1c 6.4  Med dose sliding scale  Patient was not taking PO meds  POCT glucose 4xdaily    DVT ppx: heparin  GI ppx: protonix   Diet: Regular    PT/OT/SW : On board. Discharge Planning:  Likely need SNF placement discharge planning in progress    Migdalia Hernández MD  Internal Medicine Resident, PGY-1  [address], [place]  4/11/2022, 8:50 AM    Attending Physician Statement  I have discussed the care of [address] [place] [address] and I have examined the patient myselft and taken ros and hpi , including pertinent history and exam findings,  with the resident. I have reviewed the key elements of all parts of the encounter with the resident. I agree with the assessment, plan and orders as documented by the resident.   AC on ch hypoxic resp failure   STEPH/OHS  COPD   Severe Pulm HTN   MO   PNA,   Anemia of CD   Ac CHF   Inhalers Symbicort/spiriva, alb prn   Trilogy advised, insurance to approve   Palliative care consult in am   Overall prognosis very poor           Electronically signed by Derrick Oliveira MD

## 2022-04-11 NOTE — CONSULTS
Patient Padmini Quiros   MRN -  [id number]   [facility] - [de-identified[facility]   - 1975        Date of evaluation -  2022    REASON FOR THE CONSULTATION:  Chief Complaint   Patient presents with    Respiratory Distress     Pt arrives per EMS with difficulty breathing. Pt recently treated for pneumonia. Pt given 40mg prednisone per EMS and placed on NRB. HISTORY OF PRESENT ILLNESS:    Jose Palafox is a 55y.o. 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 Patient was discharged home recently on supplemental oxygen and insurance had not approved trilogy so she was sent home with her home CPAP. Patient was brought back to the hospital because she was having worsening shortness of breath. Unable to use her CPAP because of fatigue. On arrival EMS found that her saturation was in mid [de-identified]. She was placed on nonrebreather brought to the ER. Pulmonary has been consulted because of high flow requirement and hypoxia. Patient is laying in bed, she is on high flow oxygen 80%, using BiPAP at night. Her BNP was elevated. She is under going diuresis,   On Symbicort, Spiriva and albuterol.              PAST MEDICAL HISTORY:       Diagnosis Date    Acute on chronic diastolic CHF (congestive heart failure) ([facility] 09/15/2018    HIEN (acute kidney injury) ([facility] 2019    HIEN (acute kidney injury) ([facility] 2018    Asthma     CHF     Chronic obstructive lung disease (HCC)     Chronic respiratory failure with hypoxia (HCC)     on home O2 therapy    COPD     Diabetes mellitus, new onset ([facility] 2019    Former smoker     quit smoking about 17 months ago/ She has a 22.00 pack-year smoking history    HTN     Hyperglycemia     Hyperlipidemia     Hypertension     Morbid obesity with BMI of 60.0-69.9, adult ([facility]     Neuromuscular disorder (HCC)     Neuropathy Right hand    STEPH on CPAP     DME per Dr. Krystal Justin for CPAP of 18 cmh2o    Oxygen dependent     DME 06/2018 for home oxgyen at 2.5-3 lpm ATC    Pedal edema     Pneumonia     Pulmonary hypertension, moderate to severe ([address] 06/09/2018    Torn meniscus     Wears glasses          Family History:       Problem Relation Age of Onset    Emphysema Mother     Alzheimer's Disease Mother     Other Mother         Blood disorder    High Blood Pressure Father     Arthritis Father     Diabetes Sister     Heart Failure Sister     Kidney Disease Sister     High Blood Pressure Brother     Dementia Maternal Grandmother     High Blood Pressure Maternal Grandmother     Dementia Maternal Grandfather     High Blood Pressure Maternal Grandfather     Cancer Paternal Grandmother     Heart Disease Paternal Grandfather     Diabetes Sister     Heart Failure Sister     Other Sister         Intestinal problems    No Known Problems Sister     No Known Problems Son        SURGICAL HISTORY:   Past Surgical History:   Procedure Laterality Date    ABDOMEN SURGERY      Patient had a PEG tube placed 1/2018, removed 4/2018    [id number]  June 5, 2019    CYSTOSCOPY N/A 6/5/2019    CYSTOS[facility]ides [facility]  02/2018    Removed in April 2018    [facility]  2/2/2018         JOINT REPLACEMENT      IN OFFICE/OUTPT VISIT,PROCEDURE ONLY N/A 2/17/2018    TRACHEOTOMY performed by Poornima Van MD at [address]  03/2018    TRACHEOTOMY  02/2018           SOCIAL AND OCCUPATIONAL HEALTH:   Social History     Socioeconomic History    Marital status: Single     Spouse name: None    Number of children: None    Years of education: None    Highest education level: None   Occupational History    None   Tobacco Use    Smoking status: Former Smoker     Packs/day: 1.00     Years: 22.00     Pack years: 22.00     Types: Cigarettes     Start date: 1995     Quit date: 1/15/2018     Years since quittin.2    Smokeless tobacco: Never Used   Vaping Use    Vaping Use: Never used   Substance and Sexual Activity    Alcohol use: No    Drug use: No    Sexual activity: Not Currently   Other Topics Concern    None   Social History Narrative    ** Merged History Encounter **          Social Determinants of Health     Financial Resource Strain: Low Risk     Difficulty of Paying Living Expenses: Not hard at all   Food Insecurity: Food Insecurity Present    Worried About [facility] in the Last Year: Never true    Obdulio of Food in the Last Year: Sometimes true   Transportation Needs: No Transportation Needs    Lack of Transportation (Medical): No    Lack of Transportation (Non-Medical): No   Physical Activity: Inactive    Days of Exercise per Week: 0 days    Minutes of Exercise per Session: 0 min   Stress: Stress Concern Present    Feeling of Stress : To some extent   Social Connections: Moderately Integrated    Frequency of Communication with Friends and Family: Three times a week    Frequency of Social Gatherings with Friends and Family: Three times a week    Attends Evangelical Services: 1 to 4 times per year    Active Member of [address] [place] [address] or Organizations: No    Attends Club or Organization Meetings: 1 to 4 times per year    Marital Status: Never    Intimate Partner Violence: Not At Risk    Fear of Current or Ex-Partner: No    Emotionally Abused: No    Physically Abused: No    Sexually Abused: No   Housing Stability: Unknown    Unable to Pay for Housing in the Last Year: No    Number of [place] in the Last Year: Not on file    Unstable Housing in the Last Year: No        TOBACCO:   reports that she quit smoking about 4 years ago. Her smoking use included cigarettes. She started smoking about 27 years ago. She has a 22.00 pack-year smoking history.  She has never used smokeless tobacco.  ETOH:   reports no history of alcohol use.          ALLERGIES:    Allergies   Allergen Reactions    Bee Venom Anaphylaxis     Last bee sting when patient was at 11years of age   Ardyth Moritz Sulfa Antibiotics Anaphylaxis    Asa [Aspirin]     Aspirin Other (See Comments)     HEART PALPATATIONS      Beta Adrenergic Blockers Other (See Comments)     Asystole and Bradycardia on admission  01/26/2018-2/22/2018 at [facility]    Beta Adrenergic Blockers Other (See Comments)     UNKNOWN      Sulfa Antibiotics        Home Meds:   Prior to Admission medications    Medication Sig Start Date End Date Taking? Authorizing Provider   amLODIPine (NORVASC) 5 MG tablet Take 1 tablet by mouth daily 3/28/22   Amna Faye MD   diazePAM (VALIUM) 5 MG tablet Take 5 mg by mouth every 12 hours as needed for Anxiety.     Historical Provider, MD   folic acid (FOLVITE) 1 MG tablet Take 1 mg by mouth daily    Historical Provider, MD   vitamin B-12 (CYANOCOBALAMIN) 1000 MCG tablet Take 1,000 mcg by mouth daily    Historical Provider, MD   Dulaglutide (TRULICITY) 7.71 AA/6.8ZT SOPN Inject 0.75 mg into the skin once a week    Historical Provider, MD   metOLazone (ZAROXOLYN) 2.5 MG tablet Take 2.5 mg by mouth every other day    Historical Provider, MD   glipiZIDE (GLUCOTROL XL) 2.5 MG extended release tablet Take 2.5 mg by mouth  Patient not taking: Reported on 4/9/2022    Historical Provider, MD   SM MUCUS RELIEF 600 MG extended release tablet  10/15/19   Historical Provider, MD   nystatin (MYCOSTATIN) 838945 UNIT/GM cream  10/17/19   Historical Provider, MD   Ergocalciferol (VITAMIN D2 PO) Take 50,000 Units by mouth once a week  Patient not taking: Reported on 3/18/2022    Historical Provider, MD   potassium chloride (KLOR-CON M) 10 MEQ extended release tablet Take 1 tablet by mouth daily 7/25/19   Maryuri Bray MD   bumetanide (BUMEX) 1 MG tablet Take 2 mg by mouth 2 times daily    Historical Provider, MD   JANUVIA 50 MG tablet Take 50 mg by mouth daily 6/11/19   Historical Provider, MD glucose monitoring kit (FREESTYLE) monitoring kit 1 kit by Does not apply route daily 5/8/19   Jeremi Harris MD   blood glucose monitor strips Test three  times a day & as needed for symptoms of irregular blood glucose. 5/8/19   Jeremi Harris MD   Lancets MISC 1 each by Does not apply route daily 5/8/19   Jeremi Harris MD   pantoprazole sodium (PROTONIX) 40 MG PACK packet Take 40 mg by mouth every morning (before breakfast)    Historical Provider, MD   spironolactone (ALDACTONE) 25 MG tablet Take 1 tablet by mouth daily 11/22/18   Elder Harris MD   isosorbide dinitrate (ISORDIL) 20 MG tablet Take 1 tablet by mouth 3 times daily 9/20/18   Marthena Burkitt, MD   hydrALAZINE (APRESOLINE) 25 MG tablet Take 1 tablet by mouth every 8 hours 9/20/18   Marthena Burkitt, MD   Blood Pressure KIT 1 Device by Does not apply route 2 times daily 9/20/18   Pablo Crane MD   ferrous sulfate 325 (65 Fe) MG EC tablet Take 325 g by mouth 2 times daily (with meals) 4/20/18   Historical Provider, MD   loratadine (CLARITIN) 10 MG tablet Take 10 mg by mouth daily    Historical Provider, MD   tiotropium (SPIRIVA) 18 MCG inhalation capsule Inhale 1 capsule into the lungs  8/31/17   Historical Provider, MD   sertraline (ZOLOFT) 100 MG tablet Take 100 mg by mouth daily    Historical Provider, MD   atorvastatin (LIPITOR) 40 MG tablet Take 1 tablet by mouth nightly 2/21/18   Elizabeth Chavira MD   albuterol sulfate  (90 Base) MCG/ACT inhaler Inhale 2 puffs into the lungs every 6 hours as needed for Wheezing    Historical Provider, MD   fluticasone (FLONASE) 50 MCG/ACT nasal spray 1 spray by Nasal route daily     Historical Provider, MD   albuterol (PROVENTIL) (2.5 MG/3ML) 0.083% nebulizer solution Take 3 mLs by nebulization every 6 hours as needed for Wheezing. 9/24/14   Dez Morales MD   budesonide-formoterol (SYMBICORT) 160-4.5 MCG/ACT AERO Inhale 2 puffs into the lungs 2 times daily.  9/24/14   Cristian Cedeno MD CURRENT MEDS :  Scheduled Meds:   sertraline  100 mg Oral Daily    insulin glargine  10 Units SubCUTAneous Nightly    albuterol  2.5 mg Nebulization 4x daily    levofloxacin  750 mg IntraVENous Q24H    amLODIPine  5 mg Oral Daily    atorvastatin  40 mg Oral Nightly    budesonide-formoterol  2 puff Inhalation BID    furosemide  80 mg IntraVENous BID    ferrous sulfate  325 mg Oral BID WC    folic acid  1 mg Oral Daily    hydrALAZINE  25 mg Oral 3 times per day    isosorbide dinitrate  20 mg Oral TID    pantoprazole  40 mg Oral QAM AC    spironolactone  25 mg Oral Daily    tiotropium  2 puff Inhalation Daily    vitamin B-12  1,000 mcg Oral Daily    sodium chloride flush  10 mL IntraVENous 2 times per day    heparin (porcine)  5,000 Units SubCUTAneous 3 times per day    insulin lispro  0-12 Units SubCUTAneous TID WC    insulin lispro  0-6 Units SubCUTAneous Nightly    gabapentin  300 mg Oral TID       Continuous Infusions:   sodium chloride Stopped (04/09/22 0637)    dextrose             REVIEW OF SYSTEMS:  CONSTITUTIONAL:   fatigue,   EYES:  negative for  double vision, blurred vision, dry eyes, eye discharge and redness  HEENT:  negative for  hearing loss, tinnitus, ear drainage, earaches and nasal congestion  RESPIRATORY:  See hpi  CARDIOVASCULAR:  negative for  chest pain, palpitations, orthopnea, PND  GASTROINTESTINAL:  negative for nausea, vomiting, change in bowel habits, diarrhea, constipation, abdominal pain, pruritus, abdominal mass and abdominal distention  GENITOURINARY:  negative for frequency, dysuria, nocturia, urinary incontinence and hesitancy  HEMATOLOGIC/LYMPHATIC:  negative for easy bruising, bleeding, lymphadenopathy, petechiae and swelling/edema  ALLERGIC/IMMUNOLOGIC:  negative for recurrent infections, urticaria and drug reactions  ENDOCRINE:  negative for heat intolerance, cold intolerance, tremor, weight changes and change in bowel habits  MUSCULOSKELETAL:  negative for  myalgias, arthralgias, pain, joint swelling, stiff joints and decreased range of motion  NEUROLOGICAL:  negative for headaches, dizziness, seizures, memory problems, speech problems, visual disturbance and coordination problems   skin - no rash no dermatitis     Vitals:  BP 94/82   Pulse 104   Temp 97.9 °F (36.6 °C) (Oral)   Resp 20   Ht 5' 6\" (1.676 m)   Wt (!) 355 lb (161 kg)   SpO2 90%   BMI 57.30 kg/m²     PHYSICAL EXAM:  General Appearance:    Alert, cooperative, no distress, appears older stated age, morbid obesity   Head:    Normocephalic, without obvious abnormality, atraumatic      Eyes:    PERRL, conjunctiva/corneas clear, EOM's intact   Ears:    Normal TM's and external ear canals, both ears   Nose:   Nares normal, septum midline, mucosa normal, no drainage        or sinus tenderness   Throat:   Lips, mucosa, and tongue normal; teeth and gums normal   Neck:   Short thick neck, low hanging soft palate, large tongue, large uvula. No adenopathy, no goiter,    Back:     Symmetric, no curvature, ROM normal, no CVA tenderness   Lungs:       Decreased breath sound posterior bases, clear anteriorly. Chest Wall:    No tenderness or deformity      Heart:    Regular rate and rhythm, S1 and S2, loud P2 and split S2 TR murmur                          Abdomen:                                                 Pulses:                              Skin:                  Lymph nodes:                    Neurologic:                  Soft, non-tender, bowel sounds active all four quadrants,     no masses, no organomegaly         2+ and symmetric all extremities     Skin color, texture, turgor normal, no rashes or lesions       Cervical, supraclavicular not enlarged or matted or tender      CNII-XII intact, normal strength 5/5 .        Clubbing No  Lower ext edema Yes1+   [] , 2 +  [] , 3+   []  Upper ext edema No               CBC:   Recent Labs     04/09/22  0619 04/10/22  0508 04/11/22  1020   WBC 12.9* 11.9* 9. 2   HGB 9.1* 8.7* 8.7*    215 230     BMP:    Recent Labs     04/09/22  0619 04/10/22  0508 04/11/22  0909   * 134* 134*   K 4.3 4.0 3.6*   CL 94* 96* 92*   CO2 25 28 30   BUN 26* 31* 40*   CREATININE 1.12* 1.12* 1.31*   GLUCOSE 355* 252* 234*     Hepatic:   Recent Labs     04/08/22  1844   AST 34*   ALT 57*   BILITOT 0.86   ALKPHOS 123*     XR CHEST PORTABLE    Result Date: 4/10/2022  *Stable cardiomegaly with mild central vascular congestion. *Interval increased right perihilar/infrahilar opacity which may represent developing pneumonia. XR CHEST PORTABLE    Result Date: 4/8/2022  Limited negative portable chest radiograph. CONCLUSIONS     Summary  Left ventricle is normal in size. Global left ventricular systolic function is normal. Calculated ejection  fraction 60% by Heart Model. Marked Leftward compression of inter-ventricular septum (\"D-sign\")  indicating RV volume or pressure overload. Right ventricular function appears reduced. Moderately dilated right  ventricular cavity. Moderate to severe tricuspid regurgitation. Severe pulmonary hypertension. Estimated right ventricular systolic pressure  is 04LEEY.   Trivial pulmonic insufficiency.     Signature  ----------------------------------------------------------------------------   Electronically signed by Juliet Franklin(Sonographer) on 03/22/2022   04:14 PM  ----------------------------------------------------------------------------     ----------------------------------------------------------------------------   Electronically signed by Don Ellington(Interpreting physician) on 03/23/2022   11:48 AM    IMPRESSION:    Patient Active Problem List   Diagnosis Code    Asthma J45.909    Pneumonia J18.9    HTN (hypertension) I10    Torn meniscus S83.209A    Chest pain R07.9    Pulmonary hypertension, moderate to severe (HCC) I27.20    Morbid obesity with BMI of 50.0-59.9, adult (HCC) E66.01, Z68.43    Obesity hypoventilation syndrome ([facility]) E66.2    H/O tracheostomy Z98.890    STEPH (obstructive sleep apnea) G47.33    Right heart failure, unspecified ([facility]) I50.810    Acute on chronic diastolic heart failure ([facility]) I50.33    Acute on chronic congestive heart failure ([facility]) I50.9    Diabetes mellitus, new onset ([facility] E11.9    Dizziness R42    Normocytic anemia D64.9    Pneumonia of both lower lobes due to infectious organism J18.9    NSTEMI (non-ST elevated myocardial infarction) ([facility]) I21.4    Acute pulmonary edema ([facility]) J81.0    Hypertensive emergency I16.1    Acute respiratory failure with hypoxia and hypercapnia ([facility]) J96.01, J96.02    Smoker F17.200    Morbid obesity ([facility]) E66.01    SOB (shortness of breath) R06.02    COPD exacerbation ([facility]) J44.1    ARDS (adult respiratory distress syndrome) ([facility]) J80    Fever R50.9    Disease due to rhinovirus B34.8    Encounter for PEG (percutaneous endoscopic gastrostomy) ([facility] Z43.1    Status post tracheostomy [email or web address] Z93.0    Status post insertion of percutaneous endoscopic gastrostomy (PEG) tube ([email or web address] Z93.1    Rhinovirus infection B34.8    Acute non-recurrent pansinusitis J01.40    Chronic respiratory failure ([facility]) J96.10    Pulmonary hypertension ([facility]) I27.20    Chronic narcotic dependence ([facility]) F11.20    Chronically on benzodiazepine therapy Z79.899    Neuropathy G62.9    Epigastric pain R10.13    Muscle spasm M62.838    Dyspnea R06.00    Prediabetes R73.03    Hyperlipidemia E78.5    Hypokalemia E87.6    Hypomagnesemia E83.42    CHF (congestive heart failure), NYHA class I, acute on chronic, combined ([facility]) I50.43        :     acute on chronic hypoxic hypercapnic respiratory failure         Chronic respiratory acidosis  Severe pulmonary hypertension group 2 and group 3  STEPH OHS  Chronic obstructive pulmonary disease chronic home oxygen  Morbid obesity  Acute on chronic congestive heart failure  Principal Problem:    CHF (congestive heart failure), NYHA class I, acute on chronic, combined ([facility]  Active Problems:    Asthma    HTN (hypertension)    Acute respiratory failure with hypoxia and hypercapnia (HCC)    Morbid obesity with BMI of 50.0-59.9, adult (HCC)    Obesity hypoventilation syndrome (HCC)    STEPH (obstructive sleep apnea)    Pulmonary hypertension (HCC)    Normocytic anemia    Prediabetes    Hyperlipidemia  Resolved Problems:    * No resolved hospital problems. *       PLAN:      Continue Symbicort, Spiriva and continue albuterol 4 times a day. Continue IV diuresis with Lasix  DVT prophylaxis  Continue high flow oxygen and wean to keep saturations 88 to 92%. Use BiPAP at night. I had called insurance company on her previous visit regarding expediting trilogy machine for her. But was told that it is not a physician review. Add short course of prednisone. Due to her comorbidities and severe pulmonary hypertension, patient's overall long-term prognosis is poor. She will benefit from palliative care evaluation. I hope this updates you on my evaluation and clinical thinking. Thank you for allowing me to participate in his care.      Sincerely,      Electronically signed by Gabriel Vu MD on 4/11/2022 at 1:54 PM

## 2022-04-11 NOTE — PLAN OF CARE
Problem: Falls - Risk of:  Goal: Will remain free from falls  Description: Will remain free from falls  4/11/2022 0102 by Flynn Do RN  Outcome: Ongoing  4/10/2022 1526 by Maranda Welch RN  Outcome: Ongoing  Goal: Absence of physical injury  Description: Absence of physical injury  4/11/2022 0102 by Flynn Do RN  Outcome: Ongoing  4/10/2022 1526 by Maranda Welch RN  Outcome: Ongoing     Problem: Skin Integrity:  Goal: Will show no infection signs and symptoms  Description: Will show no infection signs and symptoms  4/11/2022 0102 by Flynn Do RN  Outcome: Ongoing  4/10/2022 1526 by Maranda Welch RN  Outcome: Ongoing  Goal: Absence of new skin breakdown  Description: Absence of new skin breakdown  4/11/2022 0102 by Flynn Do RN  Outcome: Ongoing  4/10/2022 1526 by Maranda Welch RN  Outcome: Ongoing     Problem: Nutrition  Goal: Optimal nutrition therapy  4/11/2022 0102 by Flynn Do RN  Outcome: Ongoing  4/10/2022 1526 by Maranda Welch RN  Outcome: Ongoing     Problem: Pain:  Goal: Pain level will decrease  Description: Pain level will decrease  Outcome: Ongoing  Goal: Control of acute pain  Description: Control of acute pain  Outcome: Ongoing  Goal: Control of chronic pain  Description: Control of chronic pain  Outcome: Ongoing

## 2022-04-11 NOTE — PROGRESS NOTES
Physical Therapy        Physical Therapy Cancel Note      DATE: 2022    NAME: Dina Peterson  MRN: [id number]   : 1975      Patient not seen this date for Physical Therapy due to: Other: evaluation attempted, RN present with pt O2 sat of 90% on high bipap setting. RN requesting check back at this time. PT will check back as time allows or 22.        Electronically signed by Zenaida Essex, PT on 2022 at 9:45 AM

## 2022-04-11 NOTE — CARE COORDINATION
Spoke with patient about possible LTACH at discharge as she is requiring HFNC @ 80% and was just recently discharged and re-admitted for the same issue. Per patient, she previously was at an [facility] after cardiac arrest and has some negative association with the idea of being discharged to an [facility]. She states that she did not have issues with care provided there but is a little uncomfortable with going to an LTACH at this time. Patient does state she has been looked at [facility] for discharge. She states her #1 choice is [facility], #2 choice is [facility], and #3 Choice is [facility]. Referrals sent      1700: Alma Delia Bach from [address] from [place] both called back and stated they received the referral and are reviewing.

## 2022-04-11 NOTE — CARE COORDINATION
Met with pt to discuss transitional planning. Her son is looking at a couple SNFs. List provided from her insurance website.  She is currently on high flow 60% 20L

## 2022-04-12 NOTE — PROGRESS NOTES
PULMONARY & CRITICAL CARE MEDICINE PROGRESS  NOTE     Patient:  Anita Retana  MRN: [id number]  Admit date: 4/8/2022    SUBJECTIVE     I personally interviewed/examined the patient, reviewed interval history and interpreted all available radiographic, laboratory data at the time of service. Chief Compliant/Reason for Initial Consult:   Respiratory distress    Brief Hospital Course and Interval History:  Anita Retana is a 55y.o. 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 Patient was discharged home recently on supplemental oxygen and insurance had not approved trilogy so she was sent home with her home CPAP. Patient was brought back to the hospital because she was having worsening shortness of breath. Unable to use her CPAP because of fatigue. On arrival EMS found that her saturation was in mid [de-identified]. She was placed on nonrebreather brought to the ER. Pulmonary has been consulted because of high flow requirement and hypoxia. Patient is laying in bed, she is on high flow oxygen 80%, using BiPAP at night. Her BNP was elevated. She is under going diuresis,   On Symbicort, Spiriva and albuterol. Interval history:  4/12/2022  No issues overnight. Patient continues to be on high flow 40 L 80% saturating 93%. She does get short of breath with physical therapy. She got approved for trilogy. She has been using BiPAP overnight and as needed during daytime. Continue to be on IV Lasix 80 mg twice daily, as per primary.     Review of Systems:  General: negative for chills, fatigue or fever  ENT: negative for headaches, nasal congestion, sore throat or visual changes  Allergy and Immunology: negative for postnasal drip or seasonal allergies  Hematological and Lymphatic: negative for bleeding problems, swollen lymph nodes  Respiratory: Positive for shortness of breath with activity. Negative for cough and sputum production or chest pain. Cardiovascular: negative for edema or palpitations  Gastrointestinal: negative for abdominal pain, change in bowel habits or nausea/vomiting  Genito-Urinary: negative for dysuria or urinary frequency/urgency  Musculoskeletal: negative for joint pain or joint swelling  Neurological: negative for numbness/tingling, seizures or weakness  Dermatological: negative for pruritus or rash    OBJECTIVE     VITAL SIGNS:   LAST-  /65   Pulse 71   Temp 97.7 °F (36.5 °C) (Oral)   Resp 15   Ht 5' 6\" (1.676 m)   Wt (!) 347 lb 3.6 oz (157.5 kg)   SpO2 93%   BMI 56.04 kg/m²   8-24 HR RANGE-  TEMP Temp  Av.2 °F (36.8 °C)  Min: 97.7 °F (36.5 °C)  Max: 98.5 °F (09.9 °C)   BP Systolic (18ZCP), XTS:497 , Min:94 , CDA:218      Diastolic (86SVI), PSM:11, Min:51, Max:84     PULSE Pulse  Av  Min: 71  Max: 106   RR Resp  Avg: 15.4  Min: 14  Max: 16   O2 SAT SpO2  Av %  Min: 87 %  Max: 93 %   OXYGEN DELIVERY O2 Flow Rate (L/min)  Av L/min  Min: 30 L/min  Max: 30 L/min     Systemic Examination:   General appearance - looks comfortable and in no acute distress  Mental status - alert, oriented to person, place, and time  Eyes - pupils equal and reactive, extraocular eye movements intact  Mouth - mucous membranes moist, pharynx normal without lesions  Neck - supple, no significant adenopathy  Chest -decreased breath sounds on posterior leg exam, clear anteriorly. Heart -regular S1 and S2, loud P2.  TR murmur present.   Abdomen - soft, nontender, nondistended, no masses or organomegaly  Neurological - alert, oriented, normal speech, no focal findings or movement disorder noted  Extremities - peripheral pulses normal, no pedal edema, no clubbing or cyanosis  Skin - normal coloration and turgor, no rashes, no suspicious skin lesions noted     DATA REVIEW     Medications:  Scheduled Meds:   sertraline  100 mg Oral Daily    insulin glargine  10 Units SubCUTAneous Nightly    albuterol  2.5 mg Nebulization 4x daily    levofloxacin  750 mg IntraVENous Q24H    predniSONE  20 mg Oral Daily    amLODIPine  5 mg Oral Daily    atorvastatin  40 mg Oral Nightly    budesonide-formoterol  2 puff Inhalation BID    furosemide  80 mg IntraVENous BID    ferrous sulfate  325 mg Oral BID WC    folic acid  1 mg Oral Daily    hydrALAZINE  25 mg Oral 3 times per day    isosorbide dinitrate  20 mg Oral TID    pantoprazole  40 mg Oral QAM AC    spironolactone  25 mg Oral Daily    tiotropium  2 puff Inhalation Daily    vitamin B-12  1,000 mcg Oral Daily    sodium chloride flush  10 mL IntraVENous 2 times per day    heparin (porcine)  5,000 Units SubCUTAneous 3 times per day    insulin lispro  0-12 Units SubCUTAneous TID WC    insulin lispro  0-6 Units SubCUTAneous Nightly    gabapentin  300 mg Oral TID     Continuous Infusions:   sodium chloride Stopped (04/09/22 0636)    dextrose       LABS:-  ABGs:   No results found for: PH, PCO2, PO2, HCO3, O2SAT  No results for input(s): PHART, PO2ART, LSW9WNF, SHF9INP, BEART, B0PQJBYP in the last 72 hours.   Lab Results   Component Value Date    POCPH 7.458 (H) 02/22/2019    POCPCO2 54.1 (H) 02/22/2019    POCPO2 154.8 (H) 02/22/2019    POCHCO3 38.3 (H) 02/22/2019    CWKY8TCH 99 (H) 02/22/2019     CBC:   Recent Labs     04/10/22  0508 04/11/22  1020 04/12/22  0507   WBC 11.9* 9.2 9.6   HGB 8.7* 8.7* 8.7*   HCT 30.6* 29.8* 29.5*   MCV 89.2 89.8 88.9    230 254   LYMPHOPCT 6* 18* 13*   RBC 3.43* 3.32* 3.32*   MCH 25.4 26.2 26.2   MCHC 28.4 29.2 29.5   RDW 16.0* 15.7* 15.5*     BMP:   Recent Labs     04/10/22  0508 04/11/22  0909 04/12/22  0507   * 134* 137   K 4.0 3.6* 4.6   CL 96* 92* 93*   CO2 28 30 33*   BUN 31* 40* 41*   CREATININE 1.12* 1.31* 1.28*   GLUCOSE 252* 234* 300*     Liver Function Test:   No results for input(s): PROT, LABALBU, ALT, AST, GGT, ALKPHOS, BILITOT in the last 72 hours. Amylase/Lipase:  No results for input(s): AMYLASE, LIPASE in the last 72 hours. Coagulation Profile:   No results for input(s): INR, PROTIME, APTT in the last 72 hours. Cardiac Enzymes:  No results for input(s): CKTOTAL, CKMB, CKMBINDEX, TROPONINI in the last 72 hours.   Lactic Acid:  No results found for: LACTA  BNP:   No results found for: BNP  D-Dimer:  Lab Results   Component Value Date    DDIMER 0.86 03/19/2022     Others:   Lab Results   Component Value Date    TSH 2.36 02/23/2019     Lab Results   Component Value Date    LAUREN NEGATIVE 05/07/2019    CRP 8.0 (H) 06/09/2018     No results found for: Evgeny Martel  Lab Results   Component Value Date    IRON 36 (L) 04/10/2022    TIBC 239 (L) 04/10/2022    FERRITIN 327 (H) 04/10/2022     No results found for: SPEP, UPEP  No results found for: PSA, CEA, , LG7677,     Input/Output:    Intake/Output Summary (Last 24 hours) at 4/12/2022 1009  Last data filed at 4/12/2022 0603  Gross per 24 hour   Intake 812.79 ml   Output 4475 ml   Net -3662.21 ml       Microbiology:    Pathology:    Radiology:  ECHO Complete 2D W Doppler W Color    Result Date: 3/22/2022  Transthoracic Echocardiography Report (TTE)  Patient Name PRICE       Date of Study               03/22/2022               Teja Sitter   Date of      1975  Gender                      Female  Birth   Age          55 year(s)  Race                        Black   Room Number  2008        Height:                     65.98 inch, 167.6 cm   Corporate ID [id number]    Weight:                     380 pounds, 172.4 kg  #   Patient Acct [de-identified]   BSA:          2.63 m^2      BMI:      61.36  #                                                              kg/m^2   MR #         [id number]     [facility]   Accession #  [id number]  Interpreting Physician      [address] [place] [address]   Fellow                   Referring Nurse                           Practitioner   Interpreting Referring Physician         Akira Guevaar Lucien  Fellow  Additional Comments Technically difficult study. Type of Study   TTE procedure:2D Echocardiogram, M-Mode, Doppler, Color Doppler. Procedure Date Date: 03/22/2022 Start: 03:37 PM Study Location: [facility] Technical Quality: Adequate visualization Indications:Dyspnea/SOB. History / Tech. Comments: Echo done at patient bedside. Procedure explained to patient. HTN, COPD, PHTN, STEPH, HX NSTEMI Patient Status: Inpatient Height: 65.98 inches Weight: 380 pounds BSA: 2.63 m^2 BMI: 61.36 kg/m^2 Allergies   - Aspirin.   - Sulfa. CONCLUSIONS Summary Left ventricle is normal in size. Global left ventricular systolic function is normal. Calculated ejection fraction 60% by Heart Model. Marked Leftward compression of inter-ventricular septum (\"D-sign\") indicating RV volume or pressure overload. Right ventricular function appears reduced. Moderately dilated right ventricular cavity. Moderate to severe tricuspid regurgitation. Severe pulmonary hypertension. Estimated right ventricular systolic pressure is 57FQHK. Trivial pulmonic insufficiency. Signature ----------------------------------------------------------------------------  Electronically signed by Juliet Blum(Sonographer) on 03/22/2022  04:14 PM ---------------------------------------------------------------------------- ----------------------------------------------------------------------------  Electronically signed by Don Ellington(Interpreting physician) on 03/23/2022  11:48 AM ---------------------------------------------------------------------------- FINDINGS Left Atrium Left atrium is normal in size. Left Ventricle Left ventricle is normal in size. Global left ventricular systolic function is normal. Calculated ejection fraction 60% by Heart Model. Marked Leftward compression of inter-ventricular septum (\"D-sign\") indicating RV volume or pressure overload.  Right Atrium Right atrium is normal in size. Right Ventricle Right ventricular function appears reduced. Moderately dilated right ventricular cavity. Mitral Valve Normal mitral valve structure and function. No mitral regurgitation. Aortic Valve Aortic valve is trileaflet and opens adequately. No aortic insufficiency. Tricuspid Valve No obvious valvular abnormality. Moderate to severe tricuspid regurgitation. Severe pulmonary hypertension. Estimated right ventricular systolic pressure is 59DIPJ. Pulmonic Valve The pulmonic valve is normal in structure. Trivial pulmonic insufficiency. Pericardial Effusion No pericardial effusion seen. Miscellaneous E/E' average = 7.1. IVC normal diameter & inspiratory collapse indicating normal RA filling pressure .  M-mode / 2D Measurements & Calculations:   LVIDd:3.4 cm(3.7 - 5.6 cm)       Diastolic ASQSDB:31.8 ml  URQDQ:0.0 cm(2.2 - 4.0 cm)       Systolic CXQZSV:63.7 ml  CHYM:3.6 cm(0.6 - 1.1 cm)        Aortic Root:2.8 cm(2.0 - 3.7 cm)  LVPWd:1 cm(0.6 - 1.1 cm)         LA Dimension: 2.4 cm(1.9 - 4.0 cm)  Fractional Shortenin.24 %    LA volume/Index: 47.93 ml /18m^2  Calculated LVEF (%): 60.84 %     LVOT:1.7 cm                                   RVDd:4.91 cm   Mitral:                                 Aortic   Valve Area (P1/2-Time): 3.86 cm^2       Peak Velocity: 1.66 m/s  Peak E-Wave: 0.75 m/s                   Mean Velocity: 1.04 m/s  Peak A-Wave: 0.60 m/s                   Peak Gradient: 11.02 mmHg  E/A Ratio: 1.25                         Mean Gradient: 5 mmHg  Peak Gradient: 2.25 mmHg  Mean Gradient: 2 mmHg  Deceleration Time: 195 msec             Area (continuity): 1.37 cm^2  P1/2t: 57 msec                          AV VTI: 29.9 cm   Area (continuity): 1.96 cm^2  Mean Velocity: 0.75 m/s   Tricuspid:                              Pulmonic:   Peak TR Velocity: 4.34 m/s              Peak Velocity: 1.22 m/s  Peak TR Gradient: 75.3424 mmHg          Peak Gradient: 5.95 mmHg  Diastology / Tissue Doppler Septal Wall E' velocity:0.10 m/s Septal Wall E/E':7.7 Lateral Wall E' velocity:0.11 m/s Lateral Wall E/E':6.6    XR ABDOMEN (KUB) (SINGLE AP VIEW)    Result Date: 4/1/2022  EXAMINATION: ONE SUPINE XRAY VIEW(S) OF THE ABDOMEN 4/1/2022 12:45 pm COMPARISON: CT dated 05/08/2019. HISTORY: ORDERING SYSTEM PROVIDED HISTORY: Constipation, right lower abdominal  pain TECHNOLOGIST PROVIDED HISTORY: Constipation, right lower abdominal  pain Reason for Exam: supine FINDINGS: Nonspecific bowel gas pattern is seen with gaseous distension of the stomach. Mild-to-moderate amount of stool is noted in the colon. Evaluation of free air is limited on this supine view. There appears to be Trujillo catheter in place. Nonspecific bowel gas pattern with mild-to-moderate amount of stool noted in the colon. Gaseous distention of the stomach. XR CHEST PORTABLE    Result Date: 4/10/2022  EXAMINATION: ONE XRAY VIEW OF THE CHEST 4/10/2022 9:12 am COMPARISON: 04/08/2022 HISTORY: Reason for Exam: Acute hypoxia admitted with Pul edema FINDINGS: Stable cardiomegaly. Mild central vascular congestion. Interval increased right perihilar/infrahilar opacity. No sizable effusion or discernible pneumothorax. Osseous structures grossly intact. *Stable cardiomegaly with mild central vascular congestion. *Interval increased right perihilar/infrahilar opacity which may represent developing pneumonia. XR CHEST PORTABLE    Result Date: 4/8/2022  EXAMINATION: ONE XRAY VIEW OF THE CHEST 4/8/2022 4:37 pm COMPARISON: 03/22/2022 HISTORY: ORDERING SYSTEM PROVIDED HISTORY: Shortness of breath, COPD and CHF, recent admission for pneumonia TECHNOLOGIST PROVIDED HISTORY: Shortness of breath, COPD and CHF, recent admission for pneumonia Reason for Exam: upr,sob, FINDINGS: Examination is limited by the patient's body habitus and by portable technique. Cardial pericardial silhouette is prominent but stable.   There are low lung volumes is secondary bronchovascular crowding. No focal infiltrate is identified. No pneumothorax. No free air. No acute bony abnormality. Limited negative portable chest radiograph. XR CHEST PORTABLE    Result Date: 3/22/2022  EXAMINATION: ONE XRAY VIEW OF THE CHEST 3/22/2022 9:30 am COMPARISON: 03/19/2022 HISTORY: ORDERING SYSTEM PROVIDED HISTORY: improvement in pnuemonia TECHNOLOGIST PROVIDED HISTORY: improvement in pnuemonia Reason for Exam: ap uprt port chest FINDINGS: As compared to prior examination, there has been significant improvement in the right lower lobe infiltrate. Lungs appear clear. There is cardiomegaly noted. Bony structures unremarkable. Improvement in the the right basal infiltrate. XR CHEST PORTABLE    Result Date: 3/19/2022  EXAMINATION: ONE XRAY VIEW OF THE CHEST 3/19/2022 12:51 am COMPARISON: CT PA performed approximately 10 hours earlier. Portable chest obtained approximately 12 hours earlier. HISTORY: ORDERING SYSTEM PROVIDED HISTORY: Increasing O2 requirment TECHNOLOGIST PROVIDED HISTORY: Increasing O2 requirment Reason for Exam: shortness of breath, chest pain off and on   upright port FINDINGS: Mild cardiomegaly and normal pulmonary vasculature. Right worse than left bibasilar patchy airspace opacities which appear worse than exam 12 hours earlier. No pneumothorax or pleural effusion. Surrounding structures are unremarkable. Findings suggestive of worsening bibasilar pneumonia. XR CHEST PORTABLE    Result Date: 3/18/2022  EXAMINATION: ONE XRAY VIEW OF THE CHEST 3/18/2022 11:16 am COMPARISON: Chest x-ray dated 29 June 2021 HISTORY: ORDERING SYSTEM PROVIDED HISTORY: sob TECHNOLOGIST PROVIDED HISTORY: sob FINDINGS: Mild stable cardiomegaly with pulmonary vascular congestion. No pneumothorax or pleural effusion. Stable cardiomegaly with mild pulmonary vascular congestion.      CT CHEST PULMONARY EMBOLISM W CONTRAST    Result Date: 3/18/2022  EXAMINATION: CTA OF THE CHEST 3/18/2022 1:23 pm TECHNIQUE: CTA of the chest was performed after the administration of intravenous contrast.  Multiplanar reformatted images are provided for review. MIP images are provided for review. Dose modulation, iterative reconstruction, and/or weight based adjustment of the mA/kV was utilized to reduce the radiation dose to as low as reasonably achievable. COMPARISON: None HISTORY: ORDERING SYSTEM PROVIDED HISTORY: sedentary lifestyle, leg swelling, increased O2 TECHNOLOGIST PROVIDED HISTORY: sedentary lifestyle, leg swelling, increased O2 Decision Support Exception - unselect if not a suspected or confirmed emergency medical condition->Emergency Medical Condition (MA) Reason for Exam: sedentary lifestyle, leg swelling, increased O2 FINDINGS: Pulmonary Arteries: Pulmonary arteries are adequately opacified for evaluation. No evidence of intraluminal filling defect to suggest pulmonary embolism. Main pulmonary artery is normal in caliber. Mediastinum: No evidence of mediastinal lymphadenopathy. Small reactive lymph nodes seen in the mediastinum and hilar regions. The heart and pericardium demonstrate no acute abnormality. There is no acute abnormality of the thoracic aorta. Lungs/pleura: Patchy ground-glass opacity and increased markings seen in the lower lung fields bilaterally concerning for bibasilar infiltrate. No pleural effusion or pneumothorax. No pulmonary mass or nodule. Patchy ground-glass opacity in the upper lung fields bilaterally. Upper Abdomen: Limited images of the upper abdomen are unremarkable. Soft Tissues/Bones: No acute bone or soft tissue abnormality. Degenerative changes seen within the spine with no chest wall abnormality. No evidence of pulmonary embolism. There is patchy ill-defined opacification lower lung fields bilaterally suggesting infiltrate with ground-glass opacity concerning for pneumonia as well in the upper lung fields.   Reactive adenopathy throughout the mediastinum and hilar regions. RECOMMENDATIONS: Unavailable     VL DUP LOWER EXTREMITY VENOUS BILATERAL    Result Date: 3/19/2022    [facility]  Vascular Lower Extremities DVT Study Procedure   Patient Name  CARMEN       Date of Study           03/18/2022                [address]   Date of Birth 1975  Gender                  Female   Age           55 year(s)  Race                    Black   Room Number   2008        Height:                 65.98 inch, 167.6 cm   Corporate ID  [id number]    Weight:                 380 pounds, 172.4 kg  #   Patient Acct  [de-identified]   BSA:        2.63 m^2    BMI:       61.36 kg/m^2  #   MR #          [id number]     Sonographer             Shmuel Trinh   Accession #   [id number]  Interpreting Physician  Priti Snell   Referring                 Referring Physician     Luis Nagy. Tammi Scheuermann, DO  Nurse  Practitioner  Procedure Type of Study:   Veins: Lower Extremities DVT Study, Venous Scan Lower Bilateral.  Indications for Study:Leg Swelling and Shortness of breath. Patient Status:ER. Technical Quality:Limited visualization. Limitation reason:bedside exam , body habitus, deep vessels, edema. Conclusions   Summary   No evidence of superficial or deep venous thrombosis in both lower  extremities.    Signature   ----------------------------------------------------------------  Electronically signed by NIKKI Diallo(Sonographer) on  03/18/2022 01:27 PM  ----------------------------------------------------------------   ----------------------------------------------------------------  Electronically signed by Delorse Pride Reyes,Arthur(Interpreting  physician) on 03/19/2022 07:28 AM  ----------------------------------------------------------------  Findings:   Right Impression:                    Left Impression:  The common femoral, femoral,         The common femoral, femoral,  popliteal and tibial veins           popliteal and tibial veins  demonstrate normal compressibility   demonstrate normal compressibility  and augmentation. and augmentation. Normal compressibility of the great  Normal compressibility of the great  saphenous vein. saphenous vein. Normal compressibility of the small  Normal compressibility of the small  saphenous vein. saphenous vein. Limited visualization of the         Limited visualization of the  posterior tibial and peroneal veins. posterior tibial and peroneal veins. Risk Factors History +-------------------------------------------+----------+-------------------+ ! Diagnosis                                  ! Date      ! Comments           ! +-------------------------------------------+----------+-------------------+ ! Previous Scan                              !04/04/2008! WNL                ! +-------------------------------------------+----------+-------------------+ ! Chronic lung disease->COPD                 !          !                   ! +-------------------------------------------+----------+-------------------+ ! Previous Scan                              !09/22/2014! Bilateral WNL      ! +-------------------------------------------+----------+-------------------+ ! CHF                                        ! !                   ! +-------------------------------------------+----------+-------------------+   - The patient's risk factor(s) include: chronic lung disease, dyslipidemia     and arterial hypertension.   - The patient has a former tobacco history. Allergies   - Allergy:Aspirin(Drug). - Allergy:Sulfa(Drug). Velocities are measured in cm/s ; Diameters are measured in cm Right Lower Extremities DVT Study Measurements Right 2D Measurements +------------------------------------+----------+---------------+----------+ ! Location                            ! Visualized! Compressibility! Thrombosis! +------------------------------------+----------+---------------+----------+ ! Common Femoral !Yes       !Yes            ! None      ! +------------------------------------+----------+---------------+----------+ ! Prox Femoral                        !Yes       ! Yes            ! None      ! +------------------------------------+----------+---------------+----------+ ! Mid Femoral                         !Partial   !Yes            ! None      ! +------------------------------------+----------+---------------+----------+ ! Dist Femoral                        !Partial   !Yes            ! None      ! +------------------------------------+----------+---------------+----------+ ! Popliteal                           !Yes       ! Yes            ! None      ! +------------------------------------+----------+---------------+----------+ ! Sapheno Femoral Junction            ! Yes       ! Yes            ! None      ! +------------------------------------+----------+---------------+----------+ ! PTV                                 ! Partial   !Yes            ! None      ! +------------------------------------+----------+---------------+----------+ ! Peroneal                            !Partial   !Yes            ! None      ! +------------------------------------+----------+---------------+----------+ ! Gastroc                             ! Yes       ! Yes            ! None      ! +------------------------------------+----------+---------------+----------+ ! GSV Thigh                           ! Yes       ! Yes            ! None      ! +------------------------------------+----------+---------------+----------+ ! GSV Knee                            ! Yes       ! Yes            ! None      ! +------------------------------------+----------+---------------+----------+ ! GSV Ankle                           ! Yes       ! Yes            ! None      ! +------------------------------------+----------+---------------+----------+ ! SSV                                 ! Yes       ! Yes            ! None      ! +------------------------------------+----------+---------------+----------+ Right Doppler Measurements +--------------------------+---------+------+------------------------------+ ! Location                  ! Signal   !Reflux! Reflux (msec)                 ! +--------------------------+---------+------+------------------------------+ ! Common Femoral            !Pulsatile!      !                              ! +--------------------------+---------+------+------------------------------+ ! Prox Femoral              !Pulsatile!      !                              ! +--------------------------+---------+------+------------------------------+ ! Popliteal                 !Pulsatile!      !                              ! +--------------------------+---------+------+------------------------------+ Left Lower Extremities DVT Study Measurements Left 2D Measurements +------------------------------------+----------+---------------+----------+ ! Location                            ! Visualized! Compressibility! Thrombosis! +------------------------------------+----------+---------------+----------+ ! Common Femoral                      !Yes       ! Yes            ! None      ! +------------------------------------+----------+---------------+----------+ ! Prox Femoral                        !Yes       ! Yes            ! None      ! +------------------------------------+----------+---------------+----------+ ! Mid Femoral                         !Partial   !Yes            ! None      ! +------------------------------------+----------+---------------+----------+ ! Dist Femoral                        !Partial   !Yes            ! None      ! +------------------------------------+----------+---------------+----------+ ! Popliteal                           !Yes       ! Yes            ! None      ! +------------------------------------+----------+---------------+----------+ ! Sapheno Femoral Junction            ! Yes       ! Yes            ! None      ! +------------------------------------+----------+---------------+----------+ ! PTV                                 ! Partial   !Yes            ! None      ! +------------------------------------+----------+---------------+----------+ ! Peroneal                            !Partial   !Yes            ! None      ! +------------------------------------+----------+---------------+----------+ ! Gastroc                             ! Yes       ! Yes            ! None      ! +------------------------------------+----------+---------------+----------+ ! GSV Thigh                           ! Yes       ! Yes            ! None      ! +------------------------------------+----------+---------------+----------+ ! GSV Knee                            ! Yes       ! Yes            ! None      ! +------------------------------------+----------+---------------+----------+ ! GSV Ankle                           ! Yes       ! Yes            ! None      ! +------------------------------------+----------+---------------+----------+ ! SSV                                 ! Yes       ! Yes            ! None      ! +------------------------------------+----------+---------------+----------+ Left Doppler Measurements +--------------------------+---------+------+------------------------------+ ! Location                  ! Signal   !Reflux! Reflux (msec)                 ! +--------------------------+---------+------+------------------------------+ ! Common Femoral            !Pulsatile!      !                              ! +--------------------------+---------+------+------------------------------+ ! Prox Femoral              !Pulsatile!      !                              ! +--------------------------+---------+------+------------------------------+ ! Popliteal                 !Pulsatile!      !                              ! +--------------------------+---------+------+------------------------------+    FL MODIFIED BARIUM SWALLOW W VIDEO    Result Date: 3/19/2022  EXAMINATION: MODIFIED BARIUM SWALLOW WAS PERFORMED IN CONJUNCTION WITH SPEECH PATHOLOGY SERVICES TECHNIQUE: Fluoroscopic evaluation of the swallowing mechanism was performed using cineradiography with multiple consistency of barium product in conjunction with speech pathology services. FLUOROSCOPY DOSE AND TYPE OR TIME AND EXPOSURES: Fluoro time: 1.1 minute DAP: 22.570 mGy COMPARISON: None HISTORY: ORDERING SYSTEM PROVIDED HISTORY: h/o esophageal stricture per patient TECHNOLOGIST PROVIDED HISTORY: h/o esophageal stricture per patient 71-year-old female with history of esophageal stricture FINDINGS: No penetration or aspiration with the thin liquid and thick liquid substance by straw, pureed/pudding thick, soft solid and cookie solid substances. No penetration or aspiration with the above administered substances. Please see separate speech pathology report for full discussion of findings and recommendations. Echocardiogram:   Results for orders placed during the hospital encounter of 03/18/22    ECHO Complete 2D W Doppler W Color    Narrative  Transthoracic Echocardiography Report (TTE)    Patient Name PRICE       Date of Study               03/22/2022  [facility]    Date of      1975  Gender                      Female  Birth    Age          55 year(s)  Race                        Black    Room Number  2008        Height:                     65.98 inch, 167.6 cm    Corporate ID [id number]    Weight:                     380 pounds, 172.4 kg  #    Patient Acct [de-identified]   BSA:          2.63 m^2      BMI:      61.36  #                                                              kg/m^2    MR #         [id number]     Carmelita Guido    Accession #  [id number]  Interpreting Physician      [address] [facility]    Fellow                   Referring Nurse  Practitioner    Interpreting             Referring Physician         Akira Guevara [place]  Fellow    Additional Comments  Technically difficult study. Type of Study    TTE procedure:2D Echocardiogram, M-Mode, Doppler, Color Doppler. Procedure Date  Date: 03/22/2022 Start: 03:37 PM    Study Location: [facility]  Technical Quality: Adequate visualization    Indications:Dyspnea/SOB. History / Tech. Comments:  Echo done at patient bedside. Procedure explained to patient. HTN, COPD, PHTN, STEPH, HX NSTEMI    Patient Status: Inpatient    Height: 65.98 inches Weight: 380 pounds BSA: 2.63 m^2 BMI: 61.36 kg/m^2    Allergies  - Aspirin.    - Sulfa. CONCLUSIONS    Summary  Left ventricle is normal in size. Global left ventricular systolic function is normal. Calculated ejection  fraction 60% by Heart Model. Marked Leftward compression of inter-ventricular septum (\"D-sign\")  indicating RV volume or pressure overload. Right ventricular function appears reduced. Moderately dilated right  ventricular cavity. Moderate to severe tricuspid regurgitation. Severe pulmonary hypertension. Estimated right ventricular systolic pressure  is 93EBLW. Trivial pulmonic insufficiency. Signature  ----------------------------------------------------------------------------  Electronically signed by Juliet Sarmiento(Sonographer) on 03/22/2022  04:14 PM  ----------------------------------------------------------------------------    ----------------------------------------------------------------------------  Electronically signed by Don Ellington(Interpreting physician) on 03/23/2022  11:48 AM  ----------------------------------------------------------------------------  FINDINGS  Left Atrium  Left atrium is normal in size. Left Ventricle  Left ventricle is normal in size. Global left ventricular systolic function  is normal. Calculated ejection fraction 60% by Heart Model. Marked Leftward compression of inter-ventricular septum (\"D-sign\")  indicating RV volume or pressure overload. Right Atrium  Right atrium is normal in size.   Right Ventricle  Right ventricular function appears reduced. Moderately dilated right ventricular cavity. Mitral Valve  Normal mitral valve structure and function. No mitral regurgitation. Aortic Valve  Aortic valve is trileaflet and opens adequately. No aortic insufficiency. Tricuspid Valve  No obvious valvular abnormality. Moderate to severe tricuspid regurgitation. Severe pulmonary hypertension. Estimated right ventricular systolic pressure  is 61YLEZ. Pulmonic Valve  The pulmonic valve is normal in structure. Trivial pulmonic insufficiency. Pericardial Effusion  No pericardial effusion seen. Miscellaneous  E/E' average = 7.1. IVC normal diameter & inspiratory collapse indicating normal RA filling  pressure .     M-mode / 2D Measurements & Calculations:    LVIDd:3.4 cm(3.7 - 5.6 cm)       Diastolic MXEQPU:58.4 ml  ESERO:3.6 cm(2.2 - 4.0 cm)       Systolic CRYWCO:33.2 ml  EBFL:1.9 cm(0.6 - 1.1 cm)        Aortic Root:2.8 cm(2.0 - 3.7 cm)  LVPWd:1 cm(0.6 - 1.1 cm)         LA Dimension: 2.4 cm(1.9 - 4.0 cm)  Fractional Shortenin.24 %    LA volume/Index: 47.93 ml /18m^2  Calculated LVEF (%): 60.84 %     LVOT:1.7 cm  RVDd:4.91 cm    Mitral:                                 Aortic    Valve Area (P1/2-Time): 3.86 cm^2       Peak Velocity: 1.66 m/s  Peak E-Wave: 0.75 m/s                   Mean Velocity: 1.04 m/s  Peak A-Wave: 0.60 m/s                   Peak Gradient: 11.02 mmHg  E/A Ratio: 1.25                         Mean Gradient: 5 mmHg  Peak Gradient: 2.25 mmHg  Mean Gradient: 2 mmHg  Deceleration Time: 195 msec             Area (continuity): 1.37 cm^2  P1/2t: 57 msec                          AV VTI: 29.9 cm    Area (continuity): 1.96 cm^2  Mean Velocity: 0.75 m/s    Tricuspid:                              Pulmonic:    Peak TR Velocity: 4.34 m/s              Peak Velocity: 1.22 m/s  Peak TR Gradient: 75.3424 mmHg          Peak Gradient: 5.95 mmHg    Diastology / Tissue Doppler  Septal Wall E' velocity:0.10 m/s  Septal Wall E/E':7.7  Lateral Wall E' velocity:0.11 m/s  Lateral Wall E/E':6.6    No results found for this or any previous visit. Cardiac Catheterization:   No results found for this or any previous visit. ASSESSMENT AND PLAN     Assessment:  //Acute on chronic hypoxic hypercapnic respiratory failure  //Chronic respiratory acidosis  //Severe pulmonary hypertension group 2 and group 3  //Morbid obesity  //Chronic COPD on home oxygen  //Acute on chronic CHF  //Anemia of chronic disease  //Hypertension    Plan:  Continue high flow oxygen and wean to keep saturation between 88 to 92%. Continue nocturnal and as needed noninvasive mechanical ventilation (BiPAP)  Continue Symbicort bid, Spiriva bid and albuterol 4 times a day  Continue IV diuresis with Lasix. Monitor BUN/creat, electrolytes, intake output record. Continue incentive spirometry, pulmonary toilet, aspiration precautions   Antimicrobials reviewed; continue  Continue prednisone 20 mg daily for total of 4 doses. On amlodipine, Lipitor, Lasix, hydralazine, Isordil and Aldactone. DVT prophylaxis: On subcu heparin  Physical/occupational/speech therapy; increase activity as tolerated      I updated the patient regarding the current clinical condition, provisional diagnosis and management plan. I addressed concerns and answered all questions to the best of my abilities. It was my pleasure to evaluate Damián Bowman today. We will continue to follow. I would like to thank you for allowing me to participate in the care of this patient. Please feel free to call with any further questions or concerns. Katina Chau MD.  Internal Medicine Resident PGY [facility]   4/12/2022, 10:18 AM      Please note that this chart was generated using voice recognition Dragon dictation software.   Although every effort was made to ensure the accuracy of this automated transcription, some errors in transcription may have occurred. Attending Physician Statement  I have discussed the care of [address] [place] [address], including pertinent history and exam findings with the resident. I have reviewed the key elements of all parts of the encounter with the resident. I have seen and examined the patient with the resident. I agree with the assessment and plan and status of the problem list as documented. I have seen the patient during my round today, chart reviewed, labs and medications reviewed overnight events noted. Patient is currently on BiPAP 14/8/70 percent patient had been on BiPAP overnight also with same setting currently she is saturating 94 to 97%. She is on Lasix 80 mg IV twice daily. Urine output was reported to be 4475 in last 24 hours. Her blood sugar is elevated and she is on low-dose of prednisone. Hemoglobin has been stable  Her WBC count has been improving she is afebrile she is currently on levofloxacin. She is on high flow nasal cannula 80% and 30 L recommend to wean FiO2 on high flow nasal cannula to keep saturation above 88%. Encourage incentive spirometry deep breathing cough. Continue with bronchodilators. Discussed with nursing staff, treatment and plan discussed. Discussed with respiratory therapist.         Please note that this chart was generated using voice recognition Dragon dictation software. Although every effort was made to ensure the accuracy of this automated transcription, some errors in transcription may have occurred.      Rafi Goldstein MD  4/12/2022 5:23 PM

## 2022-04-12 NOTE — CARE COORDINATION
Spoke with patient regarding LTACH choices. Patient is adamant about not returning to [facility]. Referral sent to [facility]       [phone]: spoke with Samanta Garcia from [facility]. He will review and follow for discharge.

## 2022-04-12 NOTE — PLAN OF CARE
Problem: Skin Integrity:  Goal: Will show no infection signs and symptoms  Description: Will show no infection signs and symptoms  4/12/2022 1606 by Phillip Solis RCP  Outcome: Ongoing     Problem: Skin Integrity:  Goal: Absence of new skin breakdown  Description: Absence of new skin breakdown  4/12/2022 1606 by Phillip Solis RCP  Outcome: Ongoing     Problem: RESPIRATORY  Intervention: Respiratory assessment  Note: BRONCHOSPASM/BRONCHOCONSTRICTION     [x]         IMPROVE AERATION/BREATH SOUNDS  [x]   ADMINISTER BRONCHODILATOR THERAPY AS APPROPRIATE  [x]   ASSESS BREATH SOUNDS  [x]   IMPLEMENT AEROSOL/MDI PROTOCOL  [x]   PATIENT EDUCATION AS NEEDED   DAMIAN Raygozaatient Assessment complete. CHF (congestive heart failure), NYHA class I, acute on chronic, combined ([facility]) [I50.43]  Acute on chronic congestive heart failure, unspecified heart failure type ([facility] [address] [I50.9] . Vitals:    04/12/22 1531   BP:    Pulse:    Resp: 18   Temp:    SpO2: 95%   . Patients home meds are   Prior to Admission medications    Medication Sig Start Date End Date Taking? Authorizing Provider   amLODIPine (NORVASC) 5 MG tablet Take 1 tablet by mouth daily 3/28/22   Raymond MD Lonnie   diazePAM (VALIUM) 5 MG tablet Take 5 mg by mouth every 12 hours as needed for Anxiety.     Historical Provider, MD   folic acid (FOLVITE) 1 MG tablet Take 1 mg by mouth daily    Historical Provider, MD   vitamin B-12 (CYANOCOBALAMIN) 1000 MCG tablet Take 1,000 mcg by mouth daily    Historical Provider, MD   Dulaglutide (TRULICITY) 6.40 TJ/8.8JE SOPN Inject 0.75 mg into the skin once a week    Historical Provider, MD   metOLazone (ZAROXOLYN) 2.5 MG tablet Take 2.5 mg by mouth every other day    Historical Provider, MD   glipiZIDE (GLUCOTROL XL) 2.5 MG extended release tablet Take 2.5 mg by mouth  Patient not taking: Reported on 4/9/2022    Historical Provider, MD   SM MUCUS RELIEF 600 MG extended release tablet  10/15/19   Historical Provider, MD   nystatin (MYCOSTATIN) 259476 UNIT/GM cream  10/17/19   Historical Provider, MD   Ergocalciferol (VITAMIN D2 PO) Take 50,000 Units by mouth once a week  Patient not taking: Reported on 3/18/2022    Historical Provider, MD   potassium chloride (KLOR-CON M) 10 MEQ extended release tablet Take 1 tablet by mouth daily 7/25/19   Mitchelline Batch, MD   bumetanide (BUMEX) 1 MG tablet Take 2 mg by mouth 2 times daily    Historical Provider, MD   JANUVIA 50 MG tablet Take 50 mg by mouth daily 6/11/19   Historical Provider, MD   glucose monitoring kit (FREESTYLE) monitoring kit 1 kit by Does not apply route daily 5/8/19   Wilmer Jameson MD   blood glucose monitor strips Test three  times a day & as needed for symptoms of irregular blood glucose.  5/8/19   Wilmer Jameson MD   Lancets MISC 1 each by Does not apply route daily 5/8/19   Wilmer Jameson MD   pantoprazole sodium (PROTONIX) 40 MG PACK packet Take 40 mg by mouth every morning (before breakfast)    Historical Provider, MD   spironolactone (ALDACTONE) 25 MG tablet Take 1 tablet by mouth daily 11/22/18   Porter Toth MD   isosorbide dinitrate (ISORDIL) 20 MG tablet Take 1 tablet by mouth 3 times daily 9/20/18   Stephani Vega MD   hydrALAZINE (APRESOLINE) 25 MG tablet Take 1 tablet by mouth every 8 hours 9/20/18   Stephani Vega MD   Blood Pressure KIT 1 Device by Does not apply route 2 times daily 9/20/18   Joseph Garcia MD   ferrous sulfate 325 (65 Fe) MG EC tablet Take 325 g by mouth 2 times daily (with meals) 4/20/18   Historical Provider, MD   loratadine (CLARITIN) 10 MG tablet Take 10 mg by mouth daily    Historical Provider, MD   tiotropium (SPIRIVA) 18 MCG inhalation capsule Inhale 1 capsule into the lungs  8/31/17   Historical Provider, MD   sertraline (ZOLOFT) 100 MG tablet Take 100 mg by mouth daily    Historical Provider, MD   atorvastatin (LIPITOR) 40 MG tablet Take 1 tablet by mouth nightly 2/21/18   Louis Johnson MD albuterol sulfate  (90 Base) MCG/ACT inhaler Inhale 2 puffs into the lungs every 6 hours as needed for Wheezing    Historical Provider, MD   fluticasone (FLONASE) 50 MCG/ACT nasal spray 1 spray by Nasal route daily     Historical Provider, MD   albuterol (PROVENTIL) (2.5 MG/3ML) 0.083% nebulizer solution Take 3 mLs by nebulization every 6 hours as needed for Wheezing. 9/24/14   Sharon Zuleta MD   budesonide-formoterol (SYMBICORT) 160-4.5 MCG/ACT AERO Inhale 2 puffs into the lungs 2 times daily.  9/24/14   Dagoberto Cho MD   .  Recent Surgical History: None = 0     Assessment alternates between HFNC and BIPAP     RR 22  Breath Sounds: diminished      Bronchodilator assessment at level  3  Hyperinflation assessment at level   Secretion Management assessment at level      [x]    Bronchodilator Assessment  BRONCHODILATOR ASSESSMENT SCORE  Score 0 1 2 3 4 5   Breath Sounds   []  Patient Baseline []  No Wheeze good aeration []  Faint, scattered wheezing, good aeration [x]  Expiratory Wheezing and or moderately diminished []  Insp/Exp wheeze and/or very diminished []  Insp/Exp and/ or marked distress   Respiratory Rate   []  Patient Baseline []  Less than 20 []  Less than 20 [x]  20-25 []  Greater than 25 []  Greater than 25   Dyspnea re []  Patient Baseline []  No SOB []  No SOB [x]  SOB on exertion []  SOB min activity []  At rest/acute

## 2022-04-12 NOTE — PROGRESS NOTES
Physical Therapy    Facility/Department: [facility] CAR 2  Initial Assessment    NAME: Fabricio Rivers  : 1975  MRN: [id number]    Date of Service: 2022  Chief Complaint   Patient presents with    Respiratory Distress     Pt arrives per EMS with difficulty breathing. Pt recently treated for pneumonia. Pt given 40mg prednisone per EMS and placed on NRB. Discharge Recommendations:    Further therapy recommended at discharge. PT Equipment Recommendations  Equipment Needed: No    Assessment   Body structures, Functions, Activity limitations: Decreased functional mobility ; Decreased balance;Decreased ROM; Decreased strength;Decreased endurance; Increased pain  Assessment: Pt required maxAx2 to perform bed mobility, pt able to sit EOB CGA once established, however, unable to further progress functional mobility due to current endurance deficits. Pt is currently unsafe to perform functional mobility unassisted and is expected to require continued skilled PT to address functional mobility and endurance deficits to return pt to prior level of function. Prognosis: Fair  Decision Making: Medium Complexity  PT Education: Goals;PT Role;Plan of Care  REQUIRES PT FOLLOW UP: Yes  Activity Tolerance  Activity Tolerance: Patient limited by endurance       Patient Diagnosis(es): The encounter diagnosis was Acute on chronic congestive heart failure, unspecified heart failure type ([address]      has a past medical history of Acute on chronic diastolic CHF (congestive heart failure) ([address] HIEN (acute kidney injury) ([address] HIEN (acute kidney injury) ([address] Asthma, CHF, Chronic obstructive lung disease ([address] Chronic respiratory failure with hypoxia ([address] COPD, Diabetes mellitus, new onset ([address] Former smoker, HTN, Hyperglycemia, Hyperlipidemia, Hypertension, Morbid obesity with BMI of 60.0-69.9, adult ([address] Neuromuscular disorder ([address] STEPH on CPAP, Oxygen dependent, Pedal edema, Pneumonia, Pulmonary hypertension, moderate to severe ([address] Torn meniscus, and Wears glasses. has a past surgical history that includes  section (); Abdomen surgery; joint replacement; Cystoscopy (2019); Cystoscopy (N/A, 2019); hc cath power picc triple (2018); pr office/outpt visit,procedure only (N/A, 2018); Tracheotomy (2018); Gastrostomy tube placement (2018); and tracheostomy closure (2018).     Restrictions  Restrictions/Precautions  Restrictions/Precautions: Fall Risk,Up as Tolerated  Required Braces or Orthoses?: No  Position Activity Restriction  Other position/activity restrictions: up w/assist  Vision/Hearing  Vision: Impaired  Vision Exceptions: Wears glasses at all times  Hearing: Exceptions to [facility]  Hearing Exceptions: Hard of hearing/hearing concerns     Subjective  General  Patient assessed for rehabilitation services?: Yes  Response To Previous Treatment: Not applicable  Family / Caregiver Present: No  Follows Commands: Within Functional Limits  Subjective  Subjective: RN and pt in agreement for PT eval; pt supine in bed upon PT arrival, pt on 35L/80% [place] upon arrival, pt pleasant and cooperative throughout session  Pain Screening  Patient Currently in Pain: Yes  Pain Assessment  Pain Assessment: 0-10  Pain Level: 8  Pain Type: Chronic pain  Pain Location: Knee  Pain Orientation: Right  Pain Descriptors: Discomfort  Non-Pharmaceutical Pain Intervention(s): Ambulation/Increased Activity;Repositioned  Response to Pain Intervention: Patient Satisfied  Multiple Pain Sites: No  Vital Signs  Patient Currently in Pain: Yes       Orientation  Orientation  Overall Orientation Status: Within Functional Limits  Social/Functional History  Social/Functional History  Lives With: Son,Family (and grandchildren recently moved in with patient)  Type of Home: House  Home Layout: One level  Home Access: Ramped entrance  Bathroom Shower/Tub: Tub/Shower unit (unable to get into the bathroom within the home)  Bathroom Equipment: Commode  Home Equipment: Wheelchair-manual,Wheelchair-electric,Rolling walker,Oxygen,Hospital bed (7L NC at baseline)  Receives Help From: Home health (9-5 M-F, 12-8 Sat, 6-8 on Sun)  ADL Assistance: Needs assistance  Bath: Maximum assistance  Dressing: Maximum assistance  Grooming: Independent  Feeding: Independent  Toileting: Needs assistance  Homemaking Assistance: Needs assistance  Homemaking Responsibilities: No (home health aid completes)  Ambulation Assistance: Needs assistance (only walks to/from [facility])  Transfer Assistance: Independent  Active : No  Patient's  Info: son   Occupation: Unemployed  Leisure & Hobbies: Shopping and reading  Cognition   Cognition  Overall Cognitive Status: WFL    Objective  Joint Mobility  ROM RLE: Hip flexion to 90 degrees; Knee and ankle WFL  ROM LLE: Hip flexion to 90 degrees; Knee and ankle WFL  ROM RUE: See OT assessment- PT/OT coeval  ROM LUE: See OT assessment- PT/ OT coeval  Strength RLE  Strength RLE: WFL  Strength LLE  Strength LLE: WFL  Strength RUE  Comment: See OT assessment- PT/ OT coeval  Strength LUE  Comment: See OT assessment- PT/ OT coeval  Motor Control  Gross Motor?: WFL  Sensation  Overall Sensation Status: Impaired (reports numbness and tingling in the R side extremities)  Bed mobility  Supine to Sit: 2 Person assistance;Maximum assistance  Sit to Supine: 2 Person assistance;Maximum assistance  Scooting: Maximal assistance  Comment: Pt able to sit EOB ~9 minutes CGA, pt requiring intermittent cueing and rest breaks due to destaurations to 88%. Pt returning to 92% with ~1 minute seated rest break of pursed lip breathing.   Transfers  Comment: unable to safely attempt due to poor sitting tolerance  Ambulation  Ambulation?: No  Stairs/Curb  Stairs?: No     Balance  Posture: Poor  Sitting - Static: Fair;+  Sitting - Dynamic: Fair;-  Comments: pt able to sit EOB CGA once established        Plan   Plan  Times per week: 5x/week  Current Treatment Recommendations: Strengthening,Home Exercise Program,ROM,Safety Education & Training,Balance Training,Patient/Caregiver Education & Training,Endurance Training,Functional Mobility Training,Transfer Training,Gait Training  Safety Devices  Type of devices: Call light within reach,Left in bed,Patient at risk for falls,Nurse notified  Restraints  Initially in place: Yes  Restraints: 4 bed rails    AM-PAC Score  AM-PAC Inpatient Mobility Raw Score : 8 (04/12/22 1031)  AM-PAC Inpatient T-Scale Score : 28.52 (04/12/22 1031)  Mobility Inpatient CMS 0-100% Score: 86.62 (04/12/22 1031)  Mobility Inpatient CMS G-Code Modifier : CM (04/12/22 1031)          Goals  Short term goals  Time Frame for Short term goals: 14  Short term goal 1: Pt to perform bed mobility [place]  Short term goal 2: Pt to attempt functional transfers [place]  Short term goal 3: Demonstrate standing balance of good - to decrease fall risk  Short term goal 4: Actively participate in 30 minutes of therapy to demo increased endurance  Short term goal 5: Pt to ambulate 10ft w/ RW [place]  Patient Goals   Patient goals :  To get well       Therapy Time   Individual Concurrent Group Co-treatment   Time In 0912         Time Out 0938         Minutes 26         Timed Code Treatment Minutes: 8 Minutes       Agus Wells, PT

## 2022-04-12 NOTE — PLAN OF CARE
Problem: Falls - Risk of:  Goal: Will remain free from falls  Description: Will remain free from falls  4/11/2022 2334 by Rosy Heaton RN  Outcome: Ongoing  4/11/2022 1007 by Vianey Acosta RN  Outcome: Ongoing  Goal: Absence of physical injury  Description: Absence of physical injury  4/11/2022 2334 by Rosy Heaton RN  Outcome: Ongoing  4/11/2022 1007 by Vianey Acosta RN  Outcome: Ongoing     Problem: Skin Integrity:  Goal: Will show no infection signs and symptoms  Description: Will show no infection signs and symptoms  4/11/2022 2334 by Rosy Heaton RN  Outcome: Ongoing  4/11/2022 1007 by Vianey Acosta RN  Outcome: Ongoing  Goal: Absence of new skin breakdown  Description: Absence of new skin breakdown  4/11/2022 2334 by Rosy Heaton RN  Outcome: Ongoing  4/11/2022 1007 by Vianey Acosta RN  Outcome: Ongoing     Problem: Nutrition  Goal: Optimal nutrition therapy  4/11/2022 2334 by Rosy Heaton RN  Outcome: Ongoing  4/11/2022 1007 by Vianey Acosta RN  Outcome: Ongoing     Problem: Pain:  Goal: Pain level will decrease  Description: Pain level will decrease  4/11/2022 2334 by Rosy Heaton RN  Outcome: Ongoing  4/11/2022 1007 by Vianey Acosta RN  Outcome: Ongoing  Goal: Control of acute pain  Description: Control of acute pain  4/11/2022 2334 by Rosy Heaton RN  Outcome: Ongoing  4/11/2022 1007 by Vianey Acosta RN  Outcome: Ongoing  Goal: Control of chronic pain  Description: Control of chronic pain  4/11/2022 2334 by Rosy Heaton RN  Outcome: Ongoing  4/11/2022 1007 by Vianey Acosta RN  Outcome: Ongoing

## 2022-04-12 NOTE — PROGRESS NOTES
[facility]  Internal Medicine Teaching Residency Program  Inpatient Daily Progress Note  ______________________________________________________________________________    Patient: Damián Bowman  YOB: 1975   ZJ    Acct: [de-identified]     Room:   Admit date: 2022  Today's date: 22  Number of days in the hospital: 4    SUBJECTIVE   Admitting Diagnosis: CHF (congestive heart failure), NYHA class I, acute on chronic, combined ([facility]  CC: SOB  Pt examined at bedside. Chart & results reviewed. No acute events overnight. Patient is hemodynamically stable and saturating well on heated high flow nasal cannula  Patient still complains of chest pain while coughing this a.m. Temperature 97.7, RR 15, /65, SPO2 93% on BiPAP and intermittent heated high flow cannula. Glucose 300. ROS:  Constitutional:  negative for chills, fevers, sweats  Respiratory:  negative for cough, dyspnea on exertion, hemoptysis,  wheezing  Cardiovascular: lower extremity edema, palpitations  Gastrointestinal:  negative for abdominal pain, constipation, diarrhea, nausea, vomiting  Neurological:  negative for dizziness, headache    BRIEF HISTORY     The patient is a pleasant 55 y.o. 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 Patient states she has had increased shortness of breath the past 2 nights, unable to wear her CPAP nightly with increased fatigue. Patient is poor historian, states she wears 7 L O2 at home, however EMS on arrival saw patient on 4 L and was saturating in the mid 80s. Patient was placed on nonrebreather by EMS and saturations improved to 98%.     Of note, patient had previous hospitalization earlier this month for COPD exacerbation complicated by pneumonia. At that time patient was treated with antibiotics and steroids.   Pulmonology was consulted and patient was approved for trilogy noninvasive ventilator which she has yet to receive. Echocardiogram at that time showed EF 60%, \"D\" sign indicating RV pressure overload, moderate to severe MR, severe pulmonary hypertension. Patient is aware of these findings.     On my evaluation, patient resting comfortably on 15 L nonrebreather mask acute distress. Patient is morbidly obese with BMI 57. Denies any cough, chest pain, leg pain/swelling. She does have skin irritation on her buttocks. States she has been compliant with all meds (inculding bronchodilators and bumex 2mg BID) except for her PO DM meds as those were held on previous admission. Afebrile, hemodynamic stable, tachycardic in the 110s at present. Patient received prednisone and lasix 40mg IV in ED as well as rocephin and vancomycin. CXR in ED - limited negative CXR d/t body habitus.     Significant labs include proBNP on admission 4192, was in 1000s on recent discharge. Leukocytosis of 17.0. Hemoglobin 9.7. Slight bump in creatinine from baseline- 1.21. glucose 238.       4/10/2022-episode of hypoxia, patient drank 3.5 L of fluid admitted with CHF exacerbation, getting a chest x-ray, IV diuretics to continue, labs de-escalate, prednisone discontinued. 2022 - Patient has drank 2.5 L in the last 24 hours. Admitted with CHF exacerbation, currently on IV Lasix 80 mg IV twice daily. Did receive this morning. We will get a chest x-ray to look for any worsening pulmonary edema. Mild edema bilateral lower extremities noted.   Labs Descalated, prednisone discontinued      OBJECTIVE     Vital Signs:  /84   Pulse 75   Temp 98.2 °F (36.8 °C) (Axillary)   Resp 16   Ht 5' 6\" (1.676 m)   Wt (!) 347 lb 3.6 oz (157.5 kg)   SpO2 92%   BMI 56.04 kg/m²     Temp (24hrs), Av.1 °F (36.7 °C), Min:97.5 °F (36.4 °C), Max:98.5 °F (36.9 °C)    In: 812.8   Out: 1510 [Urine:4475]    Physical Exam:  Constitutional: This is a well developed, well nourished, Greater than 36 - Morbid Obesity / Extreme Obesity / Grade III 55y.o. year old female who is alert, oriented, cooperative and in no apparent distress. Head:normocephalic and atraumatic. EENT:  PERRLA. No conjunctival injections. Septum was midline, mucosa was without erythema, exudates or cobblestoning. No thrush was noted. Neck: Supple without thyromegaly. No elevated JVP. Trachea was midline. Respiratory: Due to body habitus auscultation is very limited and mild diminished breath sounds bilaterally. No wheezing. Cardiovascular: Regular without murmur, clicks, gallops or rubs. Abdomen: Slightly rounded and soft without organomegaly. No rebound, rigidity or guarding was appreciated. Lymphatic: No lymphadenopathy. Musculoskeletal: Normal curvature of the spine. No gross muscle weakness. Extremities: Mild bilateral lower extremity edema  Skin:  Warm and dry. Good color, turgor and pigmentation. No lesions or scars.   No cyanosis or clubbing  Neurological/Psychiatric: The patient's general behavior, level of consciousness, thought content and emotional status is normal.        Medications:  Scheduled Medications:    sertraline  100 mg Oral Daily    insulin glargine  10 Units SubCUTAneous Nightly    albuterol  2.5 mg Nebulization 4x daily    levofloxacin  750 mg IntraVENous Q24H    predniSONE  20 mg Oral Daily    amLODIPine  5 mg Oral Daily    atorvastatin  40 mg Oral Nightly    budesonide-formoterol  2 puff Inhalation BID    furosemide  80 mg IntraVENous BID    ferrous sulfate  325 mg Oral BID WC    folic acid  1 mg Oral Daily    hydrALAZINE  25 mg Oral 3 times per day    isosorbide dinitrate  20 mg Oral TID    pantoprazole  40 mg Oral QAM AC    spironolactone  25 mg Oral Daily    tiotropium  2 puff Inhalation Daily    vitamin B-12  1,000 mcg Oral Daily    sodium chloride flush  10 mL IntraVENous 2 times per day    heparin (porcine)  5,000 Units SubCUTAneous 3 times per day    insulin lispro  0-12 Units SubCUTAneous TID WC    insulin lispro  0-6 Units SubCUTAneous Nightly    gabapentin  300 mg Oral TID     Continuous Infusions:    sodium chloride Stopped (04/09/22 0636)    dextrose       PRN Medicationssodium chloride, 1 spray, PRN  albuterol sulfate HFA, 2 puff, Q6H PRN  diazePAM, 5 mg, Q12H PRN  sodium chloride flush, 10 mL, PRN  sodium chloride, , PRN  magnesium sulfate, 1,000 mg, PRN  ondansetron, 4 mg, Q8H PRN   Or  ondansetron, 4 mg, Q6H PRN  polyethylene glycol, 17 g, Daily PRN  glucose, 15 g, PRN  dextrose, 12.5 g, PRN  glucagon (rDNA), 1 mg, PRN  dextrose, 100 mL/hr, PRN  albuterol, 2.5 mg, As Directed RT PRN        Diagnostic Labs:  CBC:   Recent Labs     04/10/22  0508 04/11/22  1020 04/12/22  0507   WBC 11.9* 9.2 9.6   RBC 3.43* 3.32* 3.32*   HGB 8.7* 8.7* 8.7*   HCT 30.6* 29.8* 29.5*   MCV 89.2 89.8 88.9   RDW 16.0* 15.7* 15.5*    230 254     BMP:   Recent Labs     04/10/22  0508 04/11/22  0909 04/12/22  0507   * 134* 137   K 4.0 3.6* 4.6   CL 96* 92* 93*   CO2 28 30 33*   BUN 31* 40* 41*   CREATININE 1.12* 1.31* 1.28*     BNP: No results for input(s): BNP in the last 72 hours. PT/INR: No results for input(s): PROTIME, INR in the last 72 hours. APTT: No results for input(s): APTT in the last 72 hours. CARDIAC ENZYMES: No results for input(s): CKMB, CKMBINDEX, TROPONINI in the last 72 hours. Invalid input(s): CKTOTAL;3  FASTING LIPID PANEL:  Lab Results   Component Value Date    CHOL 144 11/17/2018    HDL 42 10/07/2020    TRIG 68 11/17/2018     LIVER PROFILE:   No results for input(s): AST, ALT, ALB, BILIDIR, BILITOT, ALKPHOS in the last 72 hours. MICROBIOLOGY:   Lab Results   Component Value Date/Time    CULTURE NO GROWTH 3 DAYS 04/09/2022 02:22 AM       Imaging:    XR CHEST PORTABLE    Result Date: 4/8/2022  Limited negative portable chest radiograph.        ASSESSMENT & PLAN     ASSESSMENT / PLAN:     Principal Problem:    CHF (congestive heart failure), NYHA class I, acute on chronic, combined ([facility] [address]  Active Problems:    Asthma    HTN (hypertension)    Acute respiratory failure with hypoxia and hypercapnia (HCC)    Morbid obesity with BMI of 50.0-59.9, adult (HCC)    Obesity hypoventilation syndrome (HCC)    STEPH (obstructive sleep apnea)    Pulmonary hypertension (HCC)    Normocytic anemia    Prediabetes    Hyperlipidemia  Resolved Problems:    * No resolved hospital problems. *      Acute hypoxic respiratory failure likely secondary to pulmonary edema with underlying  chronic congestive heart failure  - Echo from 3/22/2022 shows EF 60 % and it is right on the global function, moderately dilated right on the pleural cavity. Moderate to severe tricuspid regurgitation. Severe pulmonary hypertension with estimated right ventricular systolic pressure 80 mmHg  -Continue with IV diuresis furosemide 80 mg IV twice daily will change to oral tomorrow based on response  -Follow-up with chest x-ray for acute hypoxic episode this morning continue with high flow oxygen patient is a 7 L of nasal cannula oxygen at home  -Continue with BiPAP overnight  -Strict I&O's, fluid restriction to 1.5 L  - Pulmonology consulted and appreciate recommendations. History of asthma -continue with home bronchodilators, discontinue steroids today. STEPH/OHS- bipap nightly    Essential HTN  Continue home meds. Normocytic anemia iron 36, TIBC 239, iron saturation 15. Hemoglobin 8.7 and stable. Monitor H&H to monitor for any signs of bleeding. pre-DM  a1c 6.4  Med dose sliding scale  Insulin Lantus 10 U SC nightly  Patient was not taking PO meds  POCT glucose 4xdaily    DVT ppx: heparin  GI ppx: protonix   Diet: Regular    PT/OT/SW : On board. Discharge Planning:  Likely need SNF placement discharge planning in progress    Linda Quinteros MD  Internal Medicine Resident, PGY-1  [address] [facility], [address]  4/12/2022, 7:34 AM  Attending Physician Statement  I have discussed the care of [facility] and I have examined the patient myselft and taken ros and hpi , including pertinent history and exam findings,  with the resident. I have reviewed the key elements of all parts of the encounter with the resident. I agree with the assessment, plan and orders as documented by the resident.   AC on ch hypoxic resp failure   STEPH/OHS  COPD   Severe Pulm HTN   MO   PNA,   Anemia of CD   Ac CHF   Inhalers Symbicort/spiriva, alb prn   Trilogy advised, insurance to approve   Palliative care consult appreciated   Overall prognosis very poor       Electronically signed by Desiree Forrester MD

## 2022-04-12 NOTE — CONSULTS
Palliative Care Inpatient Consult    NAME:  Anita Retana  MEDICAL RECORD NUMBER:  [id number]  AGE: 55 y.o. GENDER: female  : 1975  TODAY'S DATE:  2022    Reasons for Consultation:    Goals of care  Code status discussion  Family support    Members of PC team contributing to this consultation are :  Dr. Missael Gibson: We met with the patient today and explained the role of Palliative Care to her. She was updated on her medical condition. She was anxiously awaiting discharge, hoping to go to a SNF rather than LTACH with the final plan for home thereafter. We discussed goals of care going forward. She wants to keep her sister Denia as Divine Johnson in the event she becomes incapacitated. She also wants intubation if necessary along with CPR; she will maintain FULL CODE. She complained of right-sided pain and neuropathy, for which she follows outpatient with Pain Management Dr. Jesús Rubio. She was prescribed Gabapentin and Percocet  every 6 hours as needed (last prescribed in January)    Communications with primary service  Continue with current plan of care  Code status clarified: Full Code      1- Symptom management/ pain control     Pain Assessment:  Pain is not controlled. currentyl off Opioid medications. Anxiety:  none                          Dyspnea:  acute dyspnea and chronic dyspnea                          Fatigue:  present     We feel the patient symptoms are being controlled. her current regimen is reviewed by myself and discussed with the staff. We recommend adjusting her medications as follows    - Would recommend continuing the patient's gabapentin and Percocet as prescribed by Pain Management if not contraindicated.        2- Goals of care evaluation   The patient goals of care are provide comfort care/support/palliation/relieve suffering and preserve independence/autonomy/control   Goals of care discussed with:    [x] Patient independently    [] Patient and Family    [] Family or Healthcare DPOA independently    [] Unable to discuss with patient, family/DPOA not present    3- Code Status  Full Code    4- Other recommendations   - she would benefit from a bariatric program to help with weight loss. - strict follow up with her pulmonologist.  - follow up with pain management on discharge to adjust her pain medications. Palliative Care will continue to follow Ms. Velásquez's care as needed. Thank you for allowing Palliative Care to participate in the care of Ms. Velásquez . History of Present Illness     The patient is a 55 y.o. female presented with difficulty breathing. She was recently discharged with similar complaints and diagnosis of hypoxia and acute on chronic hypercarbia. She was discharged with Trilogy machine, however, it was not approved by her insurance so she used her home CPAP (which she states does not work properly). She is continued on high flow nasal canula at FIO2 80%.      Referred to Palliative Care by   [x] Physician   [] Nursing  [] Family Request   [] Other:       Active Hospital Problems    Diagnosis Date Noted    CHF (congestive heart failure), NYHA class I, acute on chronic, combined ([facility] [I50.43] 04/08/2022    Prediabetes [R73.03] 03/19/2022    Hyperlipidemia [E78.5] 03/19/2022    Normocytic anemia [D64.9] 05/06/2019    Pulmonary hypertension ([facility] [I27.20] 11/16/2018    Morbid obesity with BMI of 50.0-59.9, adult ([facility] [E66.01, Z68.43]     Obesity hypoventilation syndrome ([facility] [E66.2]     STEPH (obstructive sleep apnea) [G47.33]     Acute respiratory failure with hypoxia and hypercapnia (HCC) [J96.01, J96.02] 01/27/2018    HTN (hypertension) [I10] 09/21/2014    Asthma [J45.909] 09/21/2014       PAST MEDICAL HISTORY      Diagnosis Date    Acute on chronic diastolic CHF (congestive heart failure) ([facility] 09/15/2018    HIEN (acute kidney injury) ([facility] 05/06/2019    HIEN (acute kidney injury) ([facility] 2018    Asthma     CHF     Chronic obstructive lung disease (HCC)     Chronic respiratory failure with hypoxia (HCC)     on home O2 therapy    COPD     Diabetes mellitus, new onset ([facility] 2019    Former smoker     quit smoking about 17 months ago/ She has a 22.00 pack-year smoking history    HTN     Hyperglycemia     Hyperlipidemia     Hypertension     Morbid obesity with BMI of 60.0-69.9, adult ([address]     Neuromuscular disorder ([facility]     Neuropathy Right hand    STEPH on CPAP     DME per Dr. Teodoro Zambrano for CPAP of 18 cmh2o    Oxygen dependent     DME 2018 for home oxgyen at 2.5-3 lpm ATC    Pedal edema     Pneumonia     Pulmonary hypertension, moderate to severe ([facility] 2018    Torn meniscus     Wears glasses        PAST SURGICAL HISTORY  Past Surgical History:   Procedure Laterality Date    ABDOMEN SURGERY      Patient had a PEG tube placed 2018, removed 2018    [address]  2019    CYSTOSCOPY N/A 2019    CYSTOSCOPY performed by Yong Allen [facility] at [facility]  2018    Removed in 2018    [facility]. CATH POWER PICC TRIPLE  2018         JOINT REPLACEMENT      AL OFFICE/OUTPT VISIT,PROCEDURE ONLY N/A 2018    TRACHEOTOMY performed by Belkis Cano MD at [address]  2018    TRACHEOTOMY  2018       SOCIAL HISTORY  Social History     Tobacco Use    Smoking status: Former Smoker     Packs/day: 1.00     Years: 22.00     Pack years: 22.00     Types: Cigarettes     Start date:      Quit date: 1/15/2018     Years since quittin.2    Smokeless tobacco: Never Used   Vaping Use    Vaping Use: Never used   Substance Use Topics    Alcohol use: No    Drug use: No       FAMILY HISTORY  Family History   Problem Relation Age of Onset    Emphysema Mother     Alzheimer's Disease Mother     Other Mother         Blood disorder    High Blood Pressure Father     Arthritis Father  Diabetes Sister     Heart Failure Sister     Kidney Disease Sister     High Blood Pressure Brother     Dementia Maternal Grandmother     High Blood Pressure Maternal Grandmother     Dementia Maternal Grandfather     High Blood Pressure Maternal Grandfather     Cancer Paternal Grandmother     Heart Disease Paternal Grandfather     Diabetes Sister     Heart Failure Sister     Other Sister         Intestinal problems    No Known Problems Sister     No Known Problems Son        ALLERGIES  Allergies   Allergen Reactions    Bee Venom Anaphylaxis     Last bee sting when patient was at 11years of age   Errol Maldonado Sulfa Antibiotics Anaphylaxis    Asa [Aspirin]     Aspirin Other (See Comments)     HEART PALPATATIONS      Beta Adrenergic Blockers Other (See Comments)     Asystole and Bradycardia on admission  01/26/2018[facility]2/22/2018 at [facility]    Beta Adrenergic Blockers Other (See Comments)     UNKNOWN      Sulfa Antibiotics        MEDICATIONS  Current Medications    lidocaine  1 patch TransDERmal Daily    sertraline  100 mg Oral Daily    insulin glargine  10 Units SubCUTAneous Nightly    albuterol  2.5 mg Nebulization 4x daily    levofloxacin  750 mg IntraVENous Q24H    predniSONE  20 mg Oral Daily    amLODIPine  5 mg Oral Daily    atorvastatin  40 mg Oral Nightly    budesonide-formoterol  2 puff Inhalation BID    furosemide  80 mg IntraVENous BID    ferrous sulfate  325 mg Oral BID WC    folic acid  1 mg Oral Daily    hydrALAZINE  25 mg Oral 3 times per day    isosorbide dinitrate  20 mg Oral TID    pantoprazole  40 mg Oral QAM AC    spironolactone  25 mg Oral Daily    tiotropium  2 puff Inhalation Daily    vitamin B-12  1,000 mcg Oral Daily    sodium chloride flush  10 mL IntraVENous 2 times per day    heparin (porcine)  5,000 Units SubCUTAneous 3 times per day    insulin lispro  0-12 Units SubCUTAneous TID WC    insulin lispro  0-6 Units SubCUTAneous Nightly    gabapentin  300 mg Oral TID acetaminophen, sodium chloride, albuterol sulfate HFA, diazePAM, sodium chloride flush, sodium chloride, magnesium sulfate, ondansetron **OR** ondansetron, polyethylene glycol, glucose, dextrose, glucagon (rDNA), dextrose, albuterol  Home Medications  No current facility-administered medications on file prior to encounter. Current Outpatient Medications on File Prior to Encounter   Medication Sig Dispense Refill    amLODIPine (NORVASC) 5 MG tablet Take 1 tablet by mouth daily 30 tablet 3    diazePAM (VALIUM) 5 MG tablet Take 5 mg by mouth every 12 hours as needed for Anxiety.  folic acid (FOLVITE) 1 MG tablet Take 1 mg by mouth daily      vitamin B-12 (CYANOCOBALAMIN) 1000 MCG tablet Take 1,000 mcg by mouth daily      Dulaglutide (TRULICITY) 6.16 XH/2.6FP SOPN Inject 0.75 mg into the skin once a week      metOLazone (ZAROXOLYN) 2.5 MG tablet Take 2.5 mg by mouth every other day      glipiZIDE (GLUCOTROL XL) 2.5 MG extended release tablet Take 2.5 mg by mouth (Patient not taking: Reported on 4/9/2022)      SM MUCUS RELIEF 600 MG extended release tablet  (Patient not taking: Reported on 3/18/2022)  0    nystatin (MYCOSTATIN) 728060 UNIT/GM cream  (Patient not taking: Reported on 4/9/2022)  5    Ergocalciferol (VITAMIN D2 PO) Take 50,000 Units by mouth once a week (Patient not taking: Reported on 3/18/2022)      potassium chloride (KLOR-CON M) 10 MEQ extended release tablet Take 1 tablet by mouth daily 180 tablet 2    bumetanide (BUMEX) 1 MG tablet Take 2 mg by mouth 2 times daily      JANUVIA 50 MG tablet Take 50 mg by mouth daily  5    glucose monitoring kit (FREESTYLE) monitoring kit 1 kit by Does not apply route daily 1 kit 0    blood glucose monitor strips Test three  times a day & as needed for symptoms of irregular blood glucose.  50 strip 0    Lancets MISC 1 each by Does not apply route daily 100 each 0    pantoprazole sodium (PROTONIX) 40 MG PACK packet Take 40 mg by mouth every morning (before breakfast)      spironolactone (ALDACTONE) 25 MG tablet Take 1 tablet by mouth daily 30 tablet 3    isosorbide dinitrate (ISORDIL) 20 MG tablet Take 1 tablet by mouth 3 times daily 90 tablet 3    hydrALAZINE (APRESOLINE) 25 MG tablet Take 1 tablet by mouth every 8 hours 90 tablet 3    Blood Pressure KIT 1 Device by Does not apply route 2 times daily 1 kit 0    ferrous sulfate 325 (65 Fe) MG EC tablet Take 325 g by mouth 2 times daily (with meals)      loratadine (CLARITIN) 10 MG tablet Take 10 mg by mouth daily      tiotropium (SPIRIVA) 18 MCG inhalation capsule Inhale 1 capsule into the lungs       sertraline (ZOLOFT) 100 MG tablet Take 100 mg by mouth daily      atorvastatin (LIPITOR) 40 MG tablet Take 1 tablet by mouth nightly 30 tablet 3    albuterol sulfate  (90 Base) MCG/ACT inhaler Inhale 2 puffs into the lungs every 6 hours as needed for Wheezing      fluticasone (FLONASE) 50 MCG/ACT nasal spray 1 spray by Nasal route daily       albuterol (PROVENTIL) (2.5 MG/3ML) 0.083% nebulizer solution Take 3 mLs by nebulization every 6 hours as needed for Wheezing. 120 each 3    budesonide-formoterol (SYMBICORT) 160-4.5 MCG/ACT AERO Inhale 2 puffs into the lungs 2 times daily.  1 Inhaler 3       Data         /62   Pulse 83   Temp 97.9 °F (36.6 °C) (Oral)   Resp 16   Ht 5' 6\" (1.676 m)   Wt (!) 347 lb 3.6 oz (157.5 kg)   SpO2 92%   BMI 56.04 kg/m²     Wt Readings from Last 3 Encounters:   04/12/22 (!) 347 lb 3.6 oz (157.5 kg)   03/19/22 (!) 355 lb (161 kg)   09/30/20 (!) 382 lb 9.6 oz (173.5 kg)        Code Status: Full Code     ADVANCED CARE PLANNING:  Patient has capacity for medical decisions: yes  Health Care Power of : yes  Living Will: no     Personal, Social, and Family History  Marital Status: single  Living situation: with family:  son  Importance of garret/Christian/spiritual beliefs: [] Very [x] Somewhat [] Not   Psychological Distress: mild  Does patient understand diagnosis/treatment? yes  Does caregiver understand diagnosis/treatment? yes    Assessment        REVIEW OF SYSTEMS  Review of Systems   Constitutional: Positive for activity change. Negative for fever. Eyes: Negative for visual disturbance. Respiratory: Positive for shortness of breath. Negative for chest tightness and wheezing. Cardiovascular: Negative for chest pain. Gastrointestinal: Negative for abdominal pain, blood in stool and nausea. Genitourinary: Negative for dysuria and hematuria. Neurological: Negative for dizziness, light-headedness and headaches. PHYSICAL ASSESSMENT:  Physical Exam  Constitutional:       Appearance: She is obese. HENT:      Head: Normocephalic and atraumatic. Comments: Terminal hair on chin  Eyes:      Extraocular Movements: Extraocular movements intact. Cardiovascular:      Rate and Rhythm: Normal rate and regular rhythm. Pulmonary:      Effort: No respiratory distress. Comments: On high flow nasal canula  Skin:     General: Skin is dry. Neurological:      General: No focal deficit present. Mental Status: She is alert and oriented to person, place, and time. Psychiatric:         Mood and Affect: Mood normal.         Behavior: Behavior normal.          Palliative Performance Scale:  ___100% Full ambulation; normal activity/No disease; full self-care; normal intake; LOC full  ___90% full ambulation; normal activity/some disease; full self-care; normal intake; LOC full  ___80% ambulation full; normal activity with effort/some disease; full self-care; normal/reduced intake; LOC full  ___70% ambulation reduced; cannot do normal work/some disease; full self-care; normal/reduce intake; LOC full  ___60%  Ambulation reduced; Significant disease; Can't do hobbies/housework; intake normal or reduced; occasional assist; LOC full/confusion  ___50%  Mainly sit/lie;  Extensive disease; Can't do any work; Considerable assist; intake normal or reduced; LOC full/confusion  _x_40%  Mainly in bed; Extensive disease; Mainly assist; intake normal or reduced; LOC full/confusion   ___30%  Bed Bound; Extensive disease; Total care; intake reduced; LOC full/confusion  ___20%  Bed Bound; Extensive disease; Total care; intake minimal; Drowsy/coma  ___10%  Bed Bound; Extensive disease; Total care; Mouth care only; Drowsy/coma  ___0       Death      Principle Problem/Diagnosis:  Principal Problem:    CHF (congestive heart failure), NYHA class I, acute on chronic, combined ([facility]  Active Problems:    Asthma    HTN (hypertension)    Acute respiratory failure with hypoxia and hypercapnia (HCC)    Morbid obesity with BMI of 50.0-59.9, adult (HCC)    Obesity hypoventilation syndrome (HCC)    STEPH (obstructive sleep apnea)    Pulmonary hypertension (HCC)    Normocytic anemia    Prediabetes    Hyperlipidemia      Please call with any palliative questions or concerns. Palliative Care Team is available via perfect serve or via phone. This note has been dictated by dragon, typing errors may be a possibility. The total time I spent in seeing the patient, discussing goals of care, advanced directives, code status, greater than 50% time in counseling and other major issues was more than 60 minutes      Electronically signed by      Mitchel Shah MD  Hospice/Palliative Care Fellow  [address] [place] [address]  4/12/2022 1:16 PM  Palliative care office: [phone]        ATTENDING ATTESTATION:    I have discussed the care of Rosio Velásquez, including pertinent history and exam findings, with the resident/fellow/NP. I have seen and examined the patient and the key elements of all parts of the encounter have been performed by me.   I agree with the assessment, plan and orders as documented by the fellow/resident/NP, after I modified the exam findings and the plan of treatments and the final version is my approved version of the assessment.         Electronically signed by Anthony Zheng MD on 4/12/2022 at 1:56 PM

## 2022-04-12 NOTE — PLAN OF CARE
Problem: Falls - Risk of:  Goal: Will remain free from falls  Description: Will remain free from falls  4/12/2022 0802 by Mayte Kim RN  Outcome: Ongoing  4/11/2022 2334 by Eliana Oliver RN  Outcome: Ongoing  Goal: Absence of physical injury  Description: Absence of physical injury  4/12/2022 0802 by Mayte Kim RN  Outcome: Ongoing  4/11/2022 2334 by Eliana Oliver RN  Outcome: Ongoing     Problem: Skin Integrity:  Goal: Will show no infection signs and symptoms  Description: Will show no infection signs and symptoms  4/12/2022 0802 by Mayte Kim RN  Outcome: Ongoing  4/11/2022 2334 by Eliana Oliver RN  Outcome: Ongoing  Goal: Absence of new skin breakdown  Description: Absence of new skin breakdown  4/12/2022 0802 by Mayte Kim RN  Outcome: Ongoing  4/11/2022 2334 by Eliana Oliver RN  Outcome: Ongoing     Problem: Nutrition  Goal: Optimal nutrition therapy  4/12/2022 0802 by Mayte Kim RN  Outcome: Ongoing  4/11/2022 2334 by Eliana Oliver RN  Outcome: Ongoing     Problem: Pain:  Goal: Pain level will decrease  Description: Pain level will decrease  4/12/2022 0802 by Mayte Kim RN  Outcome: Ongoing  4/11/2022 2334 by Eliana Oliver RN  Outcome: Ongoing  Goal: Control of acute pain  Description: Control of acute pain  4/12/2022 0802 by Mayte Kim RN  Outcome: Ongoing  4/11/2022 2334 by Eliana Oliver RN  Outcome: Ongoing  Goal: Control of chronic pain  Description: Control of chronic pain  4/12/2022 0802 by Mayte Kim RN  Outcome: Ongoing  4/11/2022 2334 by Eliana Oliver RN  Outcome: Ongoing

## 2022-04-12 NOTE — ACP (ADVANCE CARE PLANNING)
Advance Care Planning     Advance Care Planning (ACP) Physician/NP/PA (Provider) Tesha Correa      Date of ACP Conversation: 4/8/2022    Conversation Conducted with:   Patient with Decision Making [address] Maker:    Current Designated Health Care Decision Maker:    Primary Decision Maker: Michaela Dawit - Brother/Sister - [phone]    Note: Assess and validate information in current ACP documents, as indicated. Care Preferences:    Hospitalization: \"If your health worsens and it becomes clear that your chance of recovery is unlikely, what would your preference be regarding hospitalization? \"  Currently hospitalized      Ventilation: \"If you were in your present state of health and suddenly became very ill and were unable to breathe on your own, what would your preference be about the use of a ventilator (breathing machine) if it was available to you? \"    FULL CODE    \"If your health worsens and it becomes clear that your chance of recovery is unlikely, what would your preference be about the use of a ventilator (breathing machine) if it was available to you? \"   FULL CODE    Resuscitation:  \"CPR works best to restart the heart when there is a sudden event, like a heart attack, in someone who is otherwise healthy. Unfortunately, CPR does not typically restart the heart for people who have serious health conditions or who are very sick. \"    \"In the event your heart stopped as a result of an underlying serious health condition, would you want attempts to be made to restart your heart (answer \"yes\" for attempt to resuscitate) or would you prefer a natural death (answer \"no\" for do not attempt to resuscitate)? \"   FULL CODE     NOTE: If the patient has a valid advance directive AND provides care preference(s) that are inconsistent with that prior directive, advise the patient to consider either: creating a new advance directive that complies with state-specific requirements; or, if that is not possible, orally revoking that prior directive in accordance with state-specific requirements, which must be documented in the EHR. Conversation Outcomes / Follow-Up Plan:   confirmed that the healthcare Gage Matute in the chart is still current.        Length of Voluntary ACP Conversation in minutes:  16 minutes    Brooklyn Aaron MD

## 2022-04-12 NOTE — PROGRESS NOTES
Occupational Therapy   Occupational Therapy Initial Assessment  Date: 2022   Patient Name: Amrik Jauregui  MRN: [id number]     : 1975    Date of Service: 2022    Obtained from medical chart:   \" The patient is a pleasant 55 y.o. 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 Patient states she has had increased shortness of breath the past 2 nights, unable to wear her CPAP nightly with increased fatigue. Patient is poor historian, states she wears 7 L O2 at home, however EMS on arrival saw patient on 4 L and was saturating in the mid 80s. Patient was placed on nonrebreather by EMS and saturations improved to 98%. \"    Discharge Recommendations:  Patient would benefit from continued therapy after discharge  OT Equipment Recommendations  Equipment Needed: No    Assessment   Performance deficits / Impairments: Decreased functional mobility ; Decreased balance;Decreased ADL status; Decreased endurance;Decreased strength  Assessment: Patient demonstrates decreased endurance, balance and strength impacting performance in ADLs. Patient demonstrates decreased static/dynamic sitting balance and functional activity tolerance impacting performance in grooming, dressing and transfers to [facility]. Pt would benefit from continued acute OT services to address functional deficits, promote independence, and decrease caregiver assistance for safe return to prior living arrangement.   Prognosis: Fair  Decision Making: Medium Complexity  OT Education: OT Role;Plan of Care;Energy Conservation  Patient Education: Patient was educated on OT role, OT POC, purpose of evaluation, energy conservation, safety, pursed lip breathing and activity promotion. - Good return  REQUIRES OT FOLLOW UP: Yes  Activity Tolerance  Activity Tolerance: Patient Tolerated treatment well;Patient limited by fatigue  Safety Devices  Safety Devices in place: Yes  Type of devices: Left in bed;Call light within reach;Gait belt  Restraints  Initially in place: Yes  Restraints: 4 bed rails up upon entrance           Patient Diagnosis(es): The encounter diagnosis was Acute on chronic congestive heart failure, unspecified heart failure type ([address] has a past medical history of Acute on chronic diastolic CHF (congestive heart failure) ([address] HIEN (acute kidney injury) ([address] HIEN (acute kidney injury) ([address] Asthma, CHF, Chronic obstructive lung disease ([address] Chronic respiratory failure with hypoxia ([address] COPD, Diabetes mellitus, new onset ([address] Former smoker, HTN, Hyperglycemia, Hyperlipidemia, Hypertension, Morbid obesity with BMI of 60.0-69.9, adult ([address] Neuromuscular disorder ([address] STEPH on CPAP, Oxygen dependent, Pedal edema, Pneumonia, Pulmonary hypertension, moderate to severe ([address] Torn meniscus, and Wears glasses. has a past surgical history that includes  section (); Abdomen surgery; joint replacement; Cystoscopy (2019); Cystoscopy (N/A, 2019); hc cath power picc triple (2018); pr office/outpt visit,procedure only (N/A, 2018); Tracheotomy (2018); Gastrostomy tube placement (2018); and tracheostomy closure (2018). Restrictions  Restrictions/Precautions  Restrictions/Precautions: Fall Risk,Up as Tolerated  Required Braces or Orthoses?: No  Position Activity Restriction  Other position/activity restrictions: up w/assist    Subjective   General  Patient assessed for rehabilitation services?: Yes  Family / Caregiver Present: No  General Comment  Comments: RN ok'd patient for OT evaluation. Pt was agreeable, pleasant, and cooperative.   Patient Currently in Pain: Yes  Pain Assessment  Pain Assessment: 0-10  Pain Level: 8  Pain Type: Chronic pain  Pain Location: Knee  Pain Orientation: Right  Pain Descriptors: Discomfort  Response to Pain Intervention: Patient Satisfied  Vital Signs  Pulse: 83  Heart Rate Source: Monitor  Resp: 16  BP: 111/62  Oxygen Therapy  SpO2: 92 %  O2 Device: High flow nasal cannula    Social/Functional History  Social/Functional History  Lives With: Son,Family (and grandchildren recently moved in with patient)  Type of Home: House  Home Layout: One level  Home Access: Ramped entrance  Bathroom Shower/Tub: Tub/Shower unit (unable to get into the bathroom within the home)  Bathroom Equipment: Commode  Home Equipment: Wheelchair-manual,Wheelchair-electric,Rolling walker,Oxygen,Hospital bed (7L NC at baseline)  Receives Help From: Home health (9-5 M-F, 12-8 Sat, 6-8 on Sun)  ADL Assistance: Needs assistance  Bath: Maximum assistance  Dressing: Maximum assistance  Grooming: Independent  Feeding: Independent  Toileting: Needs assistance  Homemaking Assistance: Needs assistance  Homemaking Responsibilities: No (home health aid completes)  Ambulation Assistance: Needs assistance (only walks to/from [facility])  Transfer Assistance: Independent  Active : No  Patient's  Info: son   Occupation: Unemployed  Leisure & Hobbies: Shopping and reading     Objective        Orientation  Overall Orientation Status: Within Functional Limits     Balance  Sitting Balance: Moderate assistance (Patient required min A to for static sitting balance. Tolerated ~9 minutes, limited by fatigue and decreased O2 sat with exertion)  Standing Balance: Unable to assess(comment) (unable to assess standing balance due to fatigue, decreased sitting tolerance, and desaturation)  Functional Mobility  Functional Mobility Comments: RAMONE due to fatigue, poor sitting tolerance, and desaturation  ADL  Feeding: Modified independent ;Setup; Increased time to complete  Grooming: Setup; Increased time to complete;Stand by assistance;Verbal cueing  UE Bathing: Maximum assistance;Setup;Verbal cueing; Increased time to complete  LE Bathing: Maximum assistance;Setup;Verbal cueing; Increased time to complete  UE Dressing: Maximum assistance;Verbal cueing; Increased time to complete;Setup  LE Dressing: Maximum assistance;Setup;Verbal cueing; Increased time to complete  Toileting: Maximum assistance; Increased time to complete  Additional Comments: Patient completed supine to sit, Patient was able to sit EOB ~9 min with minimal assistance, RAMONE functional ADLs due to decreased balance and fatigue. Patient required rest breaks due to fatigue and desaturation to 88-89%, moderate VCs for proper breathing, O2 increased to 92-96% within 1 min of rest.  Tone RUE  RUE Tone: Normotonic  Tone LUE  LUE Tone: Normotonic  Coordination  Movements Are Fluid And Coordinated: Yes     Bed mobility  Supine to Sit: 2 Person assistance;Maximum assistance  Sit to Supine: 2 Person assistance;Maximum assistance  Scooting: Maximal assistance  Transfers  Transfer Comments: RAMONE due to fatigue and poor sitting tolerance     Cognition  Overall Cognitive Status: WFL        Sensation  Overall Sensation Status: Impaired (reports numbness and tingling in the R side extremities)      LUE AROM : WFL  Left Hand AROM: WFL  RUE AROM : WFL  Right Hand AROM: WFL  LUE Strength  Gross LUE Strength: Exceptions to [facility]  L Shoulder Flex: 4-/5  L Elbow Flex: 3+/5  L Elbow Ext: 3+/5  L Hand General: 4-/5  RUE Strength  Gross RUE Strength: Exceptions to [facility]  R Shoulder Flex: 4-/5  R Elbow Flex: 3+/5  R Elbow Ext: 3+/5  R Hand General: 4-/5     Plan   Plan  Times per week: 3-4x  Current Treatment Recommendations: Strengthening,Balance Training,Functional Mobility Training,Self-Care / ADL,Safety Education & Training,Endurance Training    AM-PAC Score     AM-[facility] Inpatient Daily Activity Raw Score: 15 (04/12/22 1333)  AM-PAC Inpatient ADL T-Scale Score : 34.69 (04/12/22 1333)  ADL Inpatient CMS 0-100% Score: 56.46 (04/12/22 1333)  ADL Inpatient CMS G-Code Modifier : CK (04/12/22 1333)     Goals  Short term goals  Time Frame for Short term goals: Patient will, by discharge.   Short term goal 1: Demo baseline grooming tasks with Mod I  Short term goal 2: Demo 15+ minutes of functional activity tolerance to engage in ADLs  Short term goal 3: Demo 4+/5 gross muscle grade in B UE to improve performance in functional tasks  Short term goal 4: Demo pursed lip breathing techniques with 1 VC for ADL completion  Short term goal 5: Demo functional bed mobility with min A to engage in functional tasks  Short term goal 6: Notify OTR to update POC as patient progresses  Patient Goals   Patient goals :  To return home       Therapy Time   Individual Concurrent Group Co-treatment   Time In 0911         Time Out 0944         Minutes 33         Timed Code Treatment Minutes: [facility] S/OT

## 2022-04-13 NOTE — PROGRESS NOTES
Comprehensive Nutrition Assessment    Type and Reason for Visit:  Reassess    Nutrition Recommendations/Plan: Modify current diet to provide 5 Carb Choices with each meal.  Encourage/monitor PO intakes as tolerated. Monitor labs, weights, and plan of care. Nutrition Assessment:  Pt reports having a good appetite and eating more than 75% of meals. Working on her breakfast tray at visit. Noted using Bipap and HFNC as needed. Last BM 4/10. Weight fluctuations noted - pt being diuresed. Labs reviewed: K 3.4 mmol/L. Meds include: Lasix, Prednisone. Malnutrition Assessment:  Malnutrition Status:  No malnutrition    Context:  Acute Illness       Estimated Daily Nutrient Needs:  Energy (kcal):  12-14 kcal/kg = 6150-9580 kcals/day; Weight Used for Energy Requirements:  Current     Protein (g):  1.5-2.0 gm/kg =  gm pro/day; Weight Used for Protein Requirements:  Ideal        Fluid (ml/day):  1500 mL/day per diet order/MD    Nutrition Related Findings:  Labs/Meds reviewed. Last BM 4/10. Wounds:  None (Redness, skin tear, excoriated areas present)       Current Nutrition Therapies:    ADULT ORAL NUTRITION SUPPLEMENT; Breakfast, Dinner; Wound Healing Oral Supplement  ADULT DIET; Regular; 3 carb choices (45 gm/meal); 1500 ml    Anthropometric Measures:  · Height: 5' 6\" (167.6 cm)  · Current Body Weight: 342 lb 9.5 oz (155.4 kg)   · Admission Body Weight: 355 lb (161 kg)    · Usual Body Weight: 355 lb (161 kg)     · Ideal Body Weight: 130 lbs; % Ideal Body Weight 263.5 %   · BMI: 55.3  · BMI Categories: Obese Class 3 (BMI 40.0 or greater)       Nutrition Diagnosis:   No nutrition diagnosis at this time     Nutrition Interventions:   Food and/or Nutrient Delivery:  Modify Current Diet  Nutrition Education/Counseling:  No recommendation at this time   Coordination of Nutrition Care:  Continue to monitor while inpatient    Goals:  Meet at least 75% of estimated nutrition needs.        Nutrition Monitoring and Evaluation:   Behavioral-Environmental Outcomes:  None Identified   Food/Nutrient Intake Outcomes:  Food and Nutrient Intake  Physical Signs/Symptoms Outcomes:  Biochemical Data,GI Status,Fluid Status or Edema,Hemodynamic Status,Weight,Skin     Discharge Planning:    Continue current diet     Electronically signed by Spike Meza RD, LD on 4/13/22 at 11:52 AM EDT    Contact: [phone]

## 2022-04-13 NOTE — PROGRESS NOTES
Occupational Therapy  Facility/Department: [facility]  Daily Treatment Note  NAME: Orly Ochoa  : 1975  MRN: [id number]    Date of Service: 2022    Discharge Recommendations:  Patient would benefit from continued therapy after discharge       Assessment   Performance deficits / Impairments: Decreased functional mobility ; Decreased balance;Decreased ADL status; Decreased endurance;Decreased strength;Decreased posture  Prognosis: Fair  Patient Education: OT POC, transfer/walker safety, importane of participating in therapy, pursed lip breathing, bed mobility - pt verbalized understanding. REQUIRES OT FOLLOW UP: Yes  Activity Tolerance  Activity Tolerance: Patient Tolerated treatment well;Patient limited by fatigue  Safety Devices  Safety Devices in place: Yes  Type of devices: Call light within reach;Gait belt;Left in bed;Nurse notified         Patient Diagnosis(es): The encounter diagnosis was Acute on chronic congestive heart failure, unspecified heart failure type ([address] has a past medical history of Acute on chronic diastolic CHF (congestive heart failure) ([address] HIEN (acute kidney injury) ([address] HIEN (acute kidney injury) ([address] Asthma, CHF, Chronic obstructive lung disease ([address] Chronic respiratory failure with hypoxia ([address] COPD, Diabetes mellitus, new onset ([address] Former smoker, HTN, Hyperglycemia, Hyperlipidemia, Hypertension, Morbid obesity with BMI of 60.0-69.9, adult ([address] Neuromuscular disorder ([address] STEPH on CPAP, Oxygen dependent, Pedal edema, Pneumonia, Pulmonary hypertension, moderate to severe ([address] Torn meniscus, and Wears glasses. has a past surgical history that includes  section (); Abdomen surgery; joint replacement; Cystoscopy (2019); Cystoscopy (N/A, 2019); hc cath power picc triple (2018); pr office/outpt visit,procedure only (N/A, 2018); Tracheotomy (2018);  Gastrostomy tube placement (2018); and tracheostomy closure (03/2018). Restrictions  Restrictions/Precautions  Restrictions/Precautions: Fall Risk,Up as Tolerated  Required Braces or Orthoses?: No  Position Activity Restriction  Other position/activity restrictions: up w/assist. Hi-yair O2 NC 30 Lm. Subjective   General  Patient assessed for rehabilitation services?: Yes  Family / Caregiver Present: No  General Comment  Pain Assessment  Pain Assessment: Faces  Howard-Baker Pain Rating: Hurts a little bit  Pain Type: Chronic pain  Pain Location: Wrist  Pain Orientation: Right  Pain Descriptors: Aching;Discomfort; Sore  Pain Frequency: Continuous  Non-Pharmaceutical Pain Intervention(s): Relaxation techniques;Repositioned;Rest;Therapeutic presence  Vital Signs  Patient Currently in Pain: Yes   Orientation  Orientation  Overall Orientation Status: Within Functional Limits  Objective    ADL  Grooming: Setup; Increased time to complete;Stand by assistance (Oral care seated EOB.)  Additional Comments: Pt bathed earlier this day. Pt completed supine/sit transfer to seated EOB. Pt completed oral care seated EOB. Sit/stand transfer using bariatric RW and platform from EOB. Pt returned to seated EOB for return to supine in bed at end of session. Pt maintained O2 saturation 89-94% during EOB/OOB activity. Balance  Sitting Balance: Contact guard assistance (Seated EOB x15 minutes. Pt initially min A subsiding to CGA when balance established.)  Standing Balance: Moderate assistance (x2)  Standing Balance  Time: 1 minute  Activity: Static standing EOB using bariatric walker. Functional Mobility  Functional Mobility Comments: RAMONE d/t poor standing balance. Bed mobility  Supine to Sit: Maximum assistance;2 Person assistance  Sit to Supine: Maximum assistance;2 Person assistance  Scooting: Maximal assistance;2 Person assistance  Comment: HOB elevated for supine/sit transfer. Transfers  Sit to stand:  Moderate assistance;2 Person assistance  Stand to sit: Moderate assistance;2 Person assistance  Transfer Comments: Static standing EOB using bariatric walker and platform. Cognition  Overall Cognitive Status: [facility]   Plan   Plan  Times per week: 3-4x  Current Treatment Recommendations: Strengthening,Balance Training,Functional Mobility Training,Self-Care / ADL,Safety Education & Training,Endurance Training  AM-PAC Score        AM-PAC Inpatient Daily Activity Raw Score: 15 (04/13/22 1612)  AM-PAC Inpatient ADL T-Scale Score : 34.69 (04/13/22 1612)  ADL Inpatient CMS 0-100% Score: 56.46 (04/13/22 1612)  ADL Inpatient CMS G-Code Modifier : CK (04/13/22 1612)    Goals  Short term goals  Time Frame for Short term goals: Patient will, by discharge. Short term goal 1: Demo baseline grooming tasks with Mod I  Short term goal 2: Demo 15+ minutes of functional activity tolerance to engage in ADLs  Short term goal 3: Demo 4+/5 gross muscle grade in B UE to improve performance in functional tasks  Short term goal 4: Demo pursed lip breathing techniques with 1 VC for ADL completion  Short term goal 5: Demo functional bed mobility with min A to engage in functional tasks  Short term goal 6: Notify OTR to update POC as patient progresses  Patient Goals   Patient goals : To return home       Therapy Time   Individual Concurrent Group Co-treatment   Time In 1320         Time Out 1354         Minutes 34         Timed Code Treatment Minutes: 30 Minutes     Pt supine in bed upon therapist arrival. Pleasant and agreeable to therapy. See above for LOF for all tasks. Pt retired to supine in bed at end of session with call light within reach. Will collaborate with OTR to update standing goals.     NATALIA Houston/MARION

## 2022-04-13 NOTE — PROGRESS NOTES
Physical Therapy  Facility/Department: [facility]  Daily Treatment Note  NAME: Tree Tanner  : 1975  MRN: [id number]    Date of Service: 2022    Discharge Recommendations:  Patient would benefit from continued therapy after discharge   PT Equipment Recommendations  Equipment Needed: No    Assessment   Body structures, Functions, Activity limitations: Decreased functional mobility ; Decreased balance;Decreased ROM; Decreased strength;Decreased endurance; Increased pain  Assessment: Pt required maxAx2 to perform bed mobility, pt able to sit EOB CGA/SBA once established. Pt completed STS with MaxA x2 from EOB to bariatric RW and maintained standing ~1 min. Pt is currently unsafe to perform functional mobility unassisted and is expected to require continued skilled PT to address functional mobility and endurance deficits to return pt to prior level of function. Prognosis: Fair  PT Education: Goals;PT Role;General Safety; Functional Mobility Training;Home Exercise Program;Transfer Training  REQUIRES PT FOLLOW UP: Yes  Activity Tolerance  Activity Tolerance: Patient limited by endurance; Patient limited by fatigue;Treatment limited secondary to medical complications (free text) (high flow O2)     Patient Diagnosis(es): The encounter diagnosis was Acute on chronic congestive heart failure, unspecified heart failure type ([address] has a past medical history of Acute on chronic diastolic CHF (congestive heart failure) ([address] HIEN (acute kidney injury) ([address] HIEN (acute kidney injury) ([address] Asthma, CHF, Chronic obstructive lung disease ([address] Chronic respiratory failure with hypoxia ([address] COPD, Diabetes mellitus, new onset ([address] Former smoker, HTN, Hyperglycemia, Hyperlipidemia, Hypertension, Morbid obesity with BMI of 60.0-69.9, adult ([address] Neuromuscular disorder ([address] STEPH on CPAP, Oxygen dependent, Pedal edema, Pneumonia, Pulmonary hypertension, moderate to severe ([address] Torn meniscus, and Wears glasses.    has a past surgical history that includes  section (); Abdomen surgery; joint replacement; Cystoscopy (2019); Cystoscopy (N/A, 2019); hc cath power picc triple (2018); pr office/outpt visit,procedure only (N/A, 2018); Tracheotomy (2018); Gastrostomy tube placement (2018); and tracheostomy closure (2018). Restrictions  Restrictions/Precautions  Restrictions/Precautions: Fall Risk,Up as Tolerated  Required Braces or Orthoses?: No  Position Activity Restriction  Other position/activity restrictions: up w/assist  Subjective   General  Response To Previous Treatment: Patient with no complaints from previous session. Family / Caregiver Present: No  Subjective  Subjective: Pt and RN agreeable to PT. Pt alert in bed upon arrival, FISHER present for mobility. Pt on high flow O2 upon arrival, pleasant and cooperative throughout. General Comment  Comments: Pt left in bed with call light within reach  Pain Screening  Patient Currently in Pain: Yes  Pain Assessment  Pain Assessment: Faces  Ohward-Baker Pain Rating: Hurts even more  Pain Type: Chronic pain  Pain Location: Wrist;Leg  Pain Orientation: Right  Pain Descriptors: Other (Comment) (neuropathy)  Pain Frequency: Continuous  Pain Onset: On-going  Clinical Progression: Not changed  Functional Pain Assessment: Prevents or interferes some active activities and ADLs  Non-Pharmaceutical Pain Intervention(s): Ambulation/Increased Activity; Distraction;Repositioned; Therapeutic presence  Response to Pain Intervention: Patient Satisfied  Vital Signs  Patient Currently in Pain: Yes       Orientation  Orientation  Overall Orientation Status: Within Functional Limits  Cognition      Objective   Bed mobility  Supine to Sit: 2 Person assistance;Maximum assistance  Sit to Supine: 2 Person assistance;Maximum assistance  Scootin Person assistance;Maximal assistance  Comment: Pt able to sit EOB ~20 mins with CGA initially progressing to SBA once seated balance was established. SpO2 fluctuating between 87% and 93% while seated EOB, pt able to increase SpO2 with PLB techniques  Transfers  Sit to Stand: 2 Person Assistance;Maximum Assistance  Stand to sit: 2 Person Assistance;Maximum Assistance  Comment: pt able to complete STS from EOB while standing in platform with use of bariatric RW. Pt unable to stand from EOB to floor bc pt is 5\" tall and unable to safely position feet to prevent sliding forward off EOB  Ambulation  Ambulation?: No     Balance  Posture: Fair  Sitting - Static: Fair;+  Sitting - Dynamic: Fair;+  Standing - Static: Fair  Standing - Dynamic: Fair  Comments: standing balance assessed with bariatric RW, pt able to maintain standing ~1 min with Marychuy x2 for balance  Exercises  Quad Sets: 10x bilat supine  Gluteal Sets: 10x bilat supine  Ankle Pumps: 10x bilat supine  Upper extremity exercises: Bicep curl, shoulder flexion/extension, punches, tricep curl, shoulder abduction/adduction. Reps: 10x bilat with green theraband  Issued HEP for supine B LE exs, all questions answered at this time    AM-PAC Score  AM-PAC Inpatient Mobility Raw Score : 9 (04/13/22 1530)  AM-PAC Inpatient T-Scale Score : 30.55 (04/13/22 1530)  Mobility Inpatient CMS 0-100% Score: 81.38 (04/13/22 1530)  Mobility Inpatient CMS G-Code Modifier : CM (04/13/22 1530)    Goals  Short term goals  Time Frame for Short term goals: 14  Short term goal 1: Pt to perform bed mobility Marychuy  Short term goal 2: Pt to attempt functional transfers Marychuy  Short term goal 3: Demonstrate standing balance of good - to decrease fall risk  Short term goal 4: Actively participate in 30 minutes of therapy to demo increased endurance  Short term goal 5: Pt to ambulate 10ft w/ RW Marychuy  Patient Goals   Patient goals :  To get well    Plan    Plan  Times per week: 5x/week  Times per day: Daily  Current Treatment Recommendations: Strengthening,Home Exercise Program,ROM,Safety Education & Training,Balance Training,Patient/Caregiver Education & Training,Endurance Training,Functional Mobility Training,Transfer Training,Gait Training  Safety Devices  Type of devices: Call light within reach,Left in bed,Patient at risk for falls,Nurse notified,Gait belt  Restraints  Initially in place: Yes  Restraints: 4 bed rails per pt request     Therapy Time   Individual Concurrent Group Co-treatment   Time In 1315         Time Out 1400         Minutes 45         Timed Code Treatment Minutes: [address]

## 2022-04-13 NOTE — CARE COORDINATION
Met with pt to discuss transitional planning. Notified her of discharge order for her to go to [facility]. She does not want to go to [facility]. She feels like she is being discharged too quickly. She would like to be transferred to [facility]. PS sent to Dr Rbo Salazar    [address] RN spoke with pt. She does not want transferred to [facility]. She is agreeable to go to [place].  They are not able to accept until high flow requirements are 60% or less

## 2022-04-13 NOTE — PLAN OF CARE
Problem: Falls - Risk of:  Goal: Will remain free from falls  4/13/2022 1356 by Hamilton Haywood RN  Outcome: Ongoing  4/13/2022 0402 by Gloria Lorenzo RN  Outcome: Ongoing  Goal: Absence of physical injury  4/13/2022 1356 by Hamilton Haywood RN  Outcome: Ongoing  4/13/2022 0402 by Gloria Lorenzo RN  Outcome: Ongoing     Problem: Skin Integrity:  Goal: Will show no infection signs and symptoms  4/13/2022 1356 by Hamilton Haywood RN  Outcome: Ongoing  4/13/2022 0402 by Gloria Lorenzo RN  Outcome: Ongoing  Goal: Absence of new skin breakdown  4/13/2022 1356 by Hamilton Haywood RN  Outcome: Ongoing  4/13/2022 0402 by Gloria Lorenzo RN  Outcome: Ongoing     Problem: Nutrition  Goal: Optimal nutrition therapy  4/13/2022 1356 by Hamilton Haywood RN  Outcome: Ongoing  4/13/2022 0402 by Gloria Lorenzo RN  Outcome: Ongoing     Problem: Pain:  Description: Pain management should include both nonpharmacologic and pharmacologic interventions.   Goal: Pain level will decrease  4/13/2022 1356 by Hamilton Haywood RN  Outcome: Ongoing  4/13/2022 0402 by Gloria Lorenzo RN  Outcome: Ongoing  Goal: Control of acute pain  4/13/2022 1356 by Hamilton Haywood RN  Outcome: Ongoing  4/13/2022 0402 by Gloria Lorenzo RN  Outcome: Ongoing  Goal: Control of chronic pain  4/13/2022 1356 by Hamilton Haywood RN  Outcome: Ongoing  4/13/2022 0402 by Gloria Lorenzo RN  Outcome: Ongoing

## 2022-04-13 NOTE — PROGRESS NOTES
Patient Evelyn Apley   MRN -  [id number]   Acct # - [de-identified]   - 1975        Date of evaluation -  2022    REASON FOR THE CONSULTATION:  Chief Complaint   Patient presents with    Respiratory Distress     Pt arrives per EMS with difficulty breathing. Pt recently treated for pneumonia. Pt given 40mg prednisone per EMS and placed on NRB. HISTORY OF PRESENT ILLNESS:    Franco Rios is a 55y.o. 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 Patient was discharged home recently on supplemental oxygen and insurance had not approved trilogy so she was sent home with her home CPAP. Patient was brought back to the hospital because she was having worsening shortness of breath. Unable to use her CPAP because of fatigue. On arrival EMS found that her saturation was in mid [de-identified]. She was placed on nonrebreather brought to the ER. Pulmonary has been consulted because of high flow requirement and hypoxia. Patient is laying in bed, she is on high flow oxygen 80%, using BiPAP at night. Her BNP was elevated. She is under going diuresis,   On Symbicort, Spiriva and albuterol.        2022   Subjective      Sob with minimal movement   Still on high flow   Complains of joint pains       PAST MEDICAL HISTORY:       Diagnosis Date    Acute on chronic diastolic CHF (congestive heart failure) ([facility] 09/15/2018    HIEN (acute kidney injury) ([facility] 2019    HIEN (acute kidney injury) ([facility] 2018    Asthma     CHF     Chronic obstructive lung disease (HCC)     Chronic respiratory failure with hypoxia (HCC)     on home O2 therapy    COPD     Diabetes mellitus, new onset ([facility] 2019    Former smoker     quit smoking about 17 months ago/ She has a 22.00 pack-year smoking history    HTN     Hyperglycemia     Hyperlipidemia     Hypertension     Morbid obesity with BMI of 60.0-69.9, adult [facility]     Neuromuscular disorder ([facility]     Neuropathy Right hand    STEPH on CPAP     DME per Dr. Anuja Diaz for CPAP of 18 cmh2o    Oxygen dependent     DME 06/2018 for home oxgyen at 2.5-3 lpm ATC    Pedal edema     Pneumonia     Pulmonary hypertension, moderate to severe ([address] 06/09/2018    Torn meniscus     Wears glasses          Family History:       Problem Relation Age of Onset    Emphysema Mother     Alzheimer's Disease Mother     Other Mother         Blood disorder    High Blood Pressure Father     Arthritis Father     Diabetes Sister     Heart Failure Sister     Kidney Disease Sister     High Blood Pressure Brother     Dementia Maternal Grandmother     High Blood Pressure Maternal Grandmother     Dementia Maternal Grandfather     High Blood Pressure Maternal Grandfather     Cancer Paternal Grandmother     Heart Disease Paternal Grandfather     Diabetes Sister     Heart Failure Sister     Other Sister         Intestinal problems    No Known Problems Sister     No Known Problems Son        SURGICAL HISTORY:   Past Surgical History:   Procedure Laterality Date    ABDOMEN SURGERY      Patient had a PEG tube placed 1/2018, removed 4/2018    [address]    CYSTOSCOPY  June 5, 2019    CYSTOSCOPY N/A 6/5/2019    CYSTOSCOPY performed by Shayna Cordova [facility] at [facility]  02/2018    Removed in April 2018    [facility] CATH POWER PICC TRIPLE  2/2/2018         JOINT REPLACEMENT      NC OFFICE/OUTPT VISIT,PROCEDURE ONLY N/A 2/17/2018    TRACHEOTOMY performed by Kelly Jacobo MD at [address] [facility] [address]  03/2018    TRACHEOTOMY  02/2018           SOCIAL AND OCCUPATIONAL HEALTH:   Social History     Socioeconomic History    Marital status: Single     Spouse name: None    Number of children: None    Years of education: None    Highest education level: None   Occupational History    None   Tobacco Use    Smoking status: Former Smoker Packs/day: 1.00     Years: 22.00     Pack years: 22.00     Types: Cigarettes     Start date: 18     Quit date: 1/15/2018     Years since quittin.2    Smokeless tobacco: Never Used   Vaping Use    Vaping Use: Never used   Substance and Sexual Activity    Alcohol use: No    Drug use: No    Sexual activity: Not Currently   Other Topics Concern    None   Social History Narrative    ** Merged History Encounter **          Social Determinants of Health     Financial Resource Strain: Low Risk     Difficulty of Paying Living Expenses: Not hard at all   Food Insecurity: Food Insecurity Present    Worried About [facility] in the Last Year: Never true    Obdulio of Food in the Last Year: Sometimes true   Transportation Needs: No Transportation Needs    Lack of Transportation (Medical): No    Lack of Transportation (Non-Medical): No   Physical Activity: Inactive    Days of Exercise per Week: 0 days    Minutes of Exercise per Session: 0 min   Stress: Stress Concern Present    Feeling of Stress : To some extent   Social Connections: Moderately Integrated    Frequency of Communication with Friends and Family: Three times a week    Frequency of Social Gatherings with Friends and Family: Three times a week    Attends Congregational Services: 1 to 4 times per year    Active Member of [address] [place] [address] [employer] or Organizations: No    Attends Club or Organization Meetings: 1 to 4 times per year    Marital Status: Never    Intimate Partner Violence: Not At Risk    Fear of Current or Ex-Partner: No    Emotionally Abused: No    Physically Abused: No    Sexually Abused: No   Housing Stability: Unknown    Unable to Pay for Housing in the Last Year: No    Number of [place] in the Last Year: Not on file    Unstable Housing in the Last Year: No        TOBACCO:   reports that she quit smoking about 4 years ago. Her smoking use included cigarettes. She started smoking about 27 years ago.  She has a 22.00 pack-year smoking history. She has never used smokeless tobacco.  ETOH:   reports no history of alcohol use. ALLERGIES:    Allergies   Allergen Reactions    Bee Venom Anaphylaxis     Last bee sting when patient was at 11years of age   Anahi Ta Sulfa Antibiotics Anaphylaxis    Asa [Aspirin]     Aspirin Other (See Comments)     HEART PALPATATIONS      Beta Adrenergic Blockers Other (See Comments)     Asystole and Bradycardia on admission  01/26/2018-2/22/2018 at [facility]    Beta Adrenergic Blockers Other (See Comments)     UNKNOWN      Sulfa Antibiotics        Home Meds:   Prior to Admission medications    Medication Sig Start Date End Date Taking? Authorizing Provider   amLODIPine (NORVASC) 5 MG tablet Take 1 tablet by mouth daily 3/28/22   Sreekanth Cameron MD   diazePAM (VALIUM) 5 MG tablet Take 5 mg by mouth every 12 hours as needed for Anxiety.     Historical Provider, MD   folic acid (FOLVITE) 1 MG tablet Take 1 mg by mouth daily    Historical Provider, MD   vitamin B-12 (CYANOCOBALAMIN) 1000 MCG tablet Take 1,000 mcg by mouth daily    Historical Provider, MD   Dulaglutide (TRULICITY) 8.50 UZ/0.1GR SOPN Inject 0.75 mg into the skin once a week    Historical Provider, MD   metOLazone (ZAROXOLYN) 2.5 MG tablet Take 2.5 mg by mouth every other day    Historical Provider, MD   glipiZIDE (GLUCOTROL XL) 2.5 MG extended release tablet Take 2.5 mg by mouth  Patient not taking: Reported on 4/9/2022    Historical Provider, MD   SM MUCUS RELIEF 600 MG extended release tablet  10/15/19   Historical Provider, MD   nystatin (MYCOSTATIN) 118745 UNIT/GM cream  10/17/19   Historical Provider, MD   Ergocalciferol (VITAMIN D2 PO) Take 50,000 Units by mouth once a week  Patient not taking: Reported on 3/18/2022    Historical Provider, MD   potassium chloride (KLOR-CON M) 10 MEQ extended release tablet Take 1 tablet by mouth daily 7/25/19   Lamin Jaimes MD   bumetanide (BUMEX) 1 MG tablet Take 2 mg by mouth 2 times daily    Historical Provider, MD   JANUVIA 50 MG tablet Take 50 mg by mouth daily 6/11/19   Historical Provider, MD   glucose monitoring kit (FREESTYLE) monitoring kit 1 kit by Does not apply route daily 5/8/19   Tammy Parekh MD   blood glucose monitor strips Test three  times a day & as needed for symptoms of irregular blood glucose.  5/8/19   Tammy Parekh MD   Lancets MISC 1 each by Does not apply route daily 5/8/19   Tammy Parekh MD   pantoprazole sodium (PROTONIX) 40 MG PACK packet Take 40 mg by mouth every morning (before breakfast)    Historical Provider, MD   spironolactone (ALDACTONE) 25 MG tablet Take 1 tablet by mouth daily 11/22/18   Ajith Melo MD   isosorbide dinitrate (ISORDIL) 20 MG tablet Take 1 tablet by mouth 3 times daily 9/20/18   Beverly Cason MD   hydrALAZINE (APRESOLINE) 25 MG tablet Take 1 tablet by mouth every 8 hours 9/20/18   Beverly Cason MD   Blood Pressure KIT 1 Device by Does not apply route 2 times daily 9/20/18   Rios Bob MD   ferrous sulfate 325 (65 Fe) MG EC tablet Take 325 g by mouth 2 times daily (with meals) 4/20/18   Historical Provider, MD   loratadine (CLARITIN) 10 MG tablet Take 10 mg by mouth daily    Historical Provider, MD   tiotropium (SPIRIVA) 18 MCG inhalation capsule Inhale 1 capsule into the lungs  8/31/17   Historical Provider, MD   sertraline (ZOLOFT) 100 MG tablet Take 100 mg by mouth daily    Historical Provider, MD   atorvastatin (LIPITOR) 40 MG tablet Take 1 tablet by mouth nightly 2/21/18   Harsha Campbell MD   albuterol sulfate  (90 Base) MCG/ACT inhaler Inhale 2 puffs into the lungs every 6 hours as needed for Wheezing    Historical Provider, MD   fluticasone (FLONASE) 50 MCG/ACT nasal spray 1 spray by Nasal route daily     Historical Provider, MD   albuterol (PROVENTIL) (2.5 MG/3ML) 0.083% nebulizer solution Take 3 mLs by nebulization every 6 hours as needed for Wheezing. 9/24/14   Kay Manzano MD   budesonide-formoterol [facility] 160-4.5 MCG/ACT AERO Inhale 2 puffs into the lungs 2 times daily. 9/24/14   Olaf Bueno MD     CURRENT MEDS :  Scheduled Meds:   lidocaine  1 patch TransDERmal Daily    insulin glargine  20 Units SubCUTAneous Nightly    insulin lispro  0-18 Units SubCUTAneous TID WC    insulin lispro  0-9 Units SubCUTAneous Nightly    sertraline  100 mg Oral Daily    albuterol  2.5 mg Nebulization 4x daily    levofloxacin  750 mg IntraVENous Q24H    predniSONE  20 mg Oral Daily    amLODIPine  5 mg Oral Daily    atorvastatin  40 mg Oral Nightly    budesonide-formoterol  2 puff Inhalation BID    furosemide  80 mg IntraVENous BID    ferrous sulfate  325 mg Oral BID WC    folic acid  1 mg Oral Daily    hydrALAZINE  25 mg Oral 3 times per day    isosorbide dinitrate  20 mg Oral TID    pantoprazole  40 mg Oral QAM AC    spironolactone  25 mg Oral Daily    tiotropium  2 puff Inhalation Daily    vitamin B-12  1,000 mcg Oral Daily    sodium chloride flush  10 mL IntraVENous 2 times per day    heparin (porcine)  5,000 Units SubCUTAneous 3 times per day    gabapentin  300 mg Oral TID       Continuous Infusions:   sodium chloride Stopped (04/09/22 0636)    dextrose             Review of Systems   Constitutional: Positive for fatigue. Negative for fever. HENT: Negative. Respiratory: Positive for cough and shortness of breath. Negative for wheezing. Cardiovascular: Negative. Gastrointestinal: Negative. Musculoskeletal: Positive for arthralgias. Neurological: Negative. Vitals:  /79   Pulse 68   Temp 98.2 °F (36.8 °C) (Axillary)   Resp 14   Ht 5' 6\" (1.676 m)   Wt (!) 342 lb 9.5 oz (155.4 kg)   SpO2 94%   BMI 55.30 kg/m²     PHYSICAL EXAM:  Neck: Short thick neck, low hanging soft palate, large tongue, large uvula.  No adenopathy, no goiter,     Head and neck atraumatic, normocephalic    Lymph nodes-no cervical, supraclavicular lymphadenopathy    Neck-no JVP elevation    Lungs - dec vent bases  No wheeze   No rales     CVS- S1, S2 regular. No S3 no S4, no murmurs    Abdomen-nontender, nondistended. Bowel sounds are present. No organomegaly    Lower extremity-+ edema    Upper extremity-no edema    Neurological-grossly normal cranial nerves. No overt motor deficit         CBC:   Recent Labs     04/11/22  1020 04/12/22  0507 04/13/22  0552   WBC 9.2 9.6 11.1   HGB 8.7* 8.7* 8.5*    254 243     BMP:    Recent Labs     04/11/22  0909 04/12/22  0507 04/13/22  0552   * 137 137   K 3.6* 4.6 3.4*   CL 92* 93* 87*   CO2 30 33* 36*   BUN 40* 41* 34*   CREATININE 1.31* 1.28* 1.21*   GLUCOSE 234* 300* 134*     Hepatic:   No results for input(s): AST, ALT, ALB, BILITOT, ALKPHOS in the last 72 hours. XR CHEST PORTABLE    Result Date: 4/10/2022  *Stable cardiomegaly with mild central vascular congestion. *Interval increased right perihilar/infrahilar opacity which may represent developing pneumonia. XR CHEST PORTABLE    Result Date: 4/8/2022  Limited negative portable chest radiograph. CONCLUSIONS     Summary  Left ventricle is normal in size. Global left ventricular systolic function is normal. Calculated ejection  fraction 60% by Heart Model. Marked Leftward compression of inter-ventricular septum (\"D-sign\")  indicating RV volume or pressure overload. Right ventricular function appears reduced. Moderately dilated right  ventricular cavity. Moderate to severe tricuspid regurgitation. Severe pulmonary hypertension. Estimated right ventricular systolic pressure  is 19EYTV.   Trivial pulmonic insufficiency.     Signature  ----------------------------------------------------------------------------   Electronically signed by Juliet Perez(Sonographer) on 03/22/2022   04:14 PM  ----------------------------------------------------------------------------     ----------------------------------------------------------------------------   Electronically signed by Don Ellington(Interpreting physician) on 03/23/2022   11:48 AM    IMPRESSION:    Patient Active Problem List   Diagnosis Code    Asthma J45.909    Pneumonia J18.9    HTN (hypertension) I10    Torn meniscus S83.209A    Chest pain R07.9    Pulmonary hypertension, moderate to severe (HCC) I27.20    Morbid obesity with BMI of 50.0-59.9, adult ([facility] E66.01, Z68.43    Obesity hypoventilation syndrome (HCC) E66.2    H/O tracheostomy Z98.890    STEPH (obstructive sleep apnea) G47.33    Right heart failure, unspecified ([facility]) I50.810    Acute on chronic diastolic heart failure ([facility]) I50.33    Acute on chronic congestive heart failure ([facility]) I50.9    Diabetes mellitus, new onset ([facility] E11.9    Dizziness R42    Normocytic anemia D64.9    Pneumonia of both lower lobes due to infectious organism J18.9    NSTEMI (non-ST elevated myocardial infarction) ([facility]) I21.4    Acute pulmonary edema ([facility]) J81.0    Hypertensive emergency I16.1    Acute respiratory failure with hypoxia and hypercapnia ([facility]) J96.01, J96.02    Smoker F17.200    Morbid obesity ([facility]) E66.01    SOB (shortness of breath) R06.02    COPD exacerbation ([facility]) J44.1    ARDS (adult respiratory distress syndrome) ([facility]) J80    Fever R50.9    Disease due to rhinovirus B34.8    Encounter for PEG (percutaneous endoscopic gastrostomy) ([facility] Z43.1    Status post tracheostomy ([facility] Z93.0    Status post insertion of percutaneous endoscopic gastrostomy (PEG) tube ([facility] Z93.1    Rhinovirus infection B34.8    Acute non-recurrent pansinusitis J01.40    Chronic respiratory failure ([facility]) J96.10    Pulmonary hypertension (HCC) I27.20    Chronic narcotic dependence (HCC) F11.20    Chronically on benzodiazepine therapy Z79.899    Neuropathy G62.9    Epigastric pain R10.13    Muscle spasm M62.838    Dyspnea R06.00    Prediabetes R73.03    Hyperlipidemia E78.5    Hypokalemia E87.6    Hypomagnesemia E83.42    CHF (congestive heart failure), NYHA class I, acute on chronic, combined ([facility] I50.43        :     acute on chronic hypoxic hypercapnic respiratory failure         Chronic respiratory acidosis  Severe pulmonary hypertension group 2 and group 3  STEPH OHS  Chronic obstructive pulmonary disease chronic home oxygen  Morbid obesity  Acute on chronic congestive heart failure  Principal Problem:    CHF (congestive heart failure), NYHA class I, acute on chronic, combined ([facility]  Active Problems:    Asthma    HTN (hypertension)    Acute respiratory failure with hypoxia and hypercapnia ([facility])    Morbid obesity with BMI of 50.0-59.9, adult ([facility])    Obesity hypoventilation syndrome (HCC)    STEPH (obstructive sleep apnea)    Pulmonary hypertension ([facility])    Normocytic anemia    Prediabetes    Hyperlipidemia  Resolved Problems:    * No resolved hospital problems. *       PLAN:      Continue to wean high flow - keep sats 88-92 %   bipap at night and prn during day   1. Continue Bronchodilators - Albuterol spiriva and LABA + ICS  2. Steroid   3. Dc planning to ltach     D/w pt and case management   Ok for small doses of percocet for chronic pain             I hope this updates you on my evaluation and clinical thinking. Thank you for allowing me to participate in his care.      Sincerely,      Electronically signed by Dakota Packer MD on 4/13/2022 at 11:21 AM

## 2022-04-13 NOTE — PLAN OF CARE
Problem: Falls - Risk of:  Goal: Will remain free from falls  Description: Will remain free from falls  Outcome: Ongoing  Goal: Absence of physical injury  Description: Absence of physical injury  Outcome: Ongoing     Problem: Skin Integrity:  Goal: Will show no infection signs and symptoms  Description: Will show no infection signs and symptoms  4/13/2022 0402 by Verda Landau, RN  Outcome: Ongoing  4/12/2022 1606 by Ted eFrnandez RCP  Outcome: Ongoing  Goal: Absence of new skin breakdown  Description: Absence of new skin breakdown  4/13/2022 0402 by Verda Landau, RN  Outcome: Ongoing  4/12/2022 1606 by Ted Fernandez RCP  Outcome: Ongoing     Problem: Nutrition  Goal: Optimal nutrition therapy  Outcome: Ongoing     Problem: Pain:  Goal: Pain level will decrease  Description: Pain level will decrease  Outcome: Ongoing  Goal: Control of acute pain  Description: Control of acute pain  Outcome: Ongoing  Goal: Control of chronic pain  Description: Control of chronic pain  Outcome: Ongoing

## 2022-04-13 NOTE — PLAN OF CARE
Called [facility] to start a transfer to [facility] at 4:30 pm as per  Cheryl Marcum's  request. Provided details about MRN, patient name, diagnosis and Covid test which was done on 3/19/2022. Physician at [id number] not available right now and I was told that I would receive a call back when one is available. Later I received a call back at 5:30 pm from [facility] saying that \"There are no beds available at [facility] for a week. \"  Later Neelima Swift has called RN Arturo Kramer who told that the patient has now changed her mind and does not want to go to [facility] anymore.

## 2022-04-13 NOTE — PROGRESS NOTES
[facility]  Internal Medicine Teaching Residency Program  Inpatient Daily Progress Note  ______________________________________________________________________________    Patient: Christina Barkley  YOB: 1975   [facility]:[id number]    Acct: [de-identified]     Room: 2004/2004-01  Admit date: 4/8/2022  Today's date: 04/13/22  Number of days in the hospital: 5    SUBJECTIVE   Admitting Diagnosis: CHF (congestive heart failure), NYHA class I, acute on chronic, combined ([facility] [address]  CC: SOB  Pt examined at bedside. Chart & results reviewed. Overnight BG went up to 423 and then insulin Lantus was increased to 20 nightly. Patient is afebrile, normotensive and saturating well on BiPAP. Patient states she slept well last night on the BiPAP. Still complains of pain. Palliative care consulted. Awaiting placement to LTAC.    ROS:  Constitutional:  negative for chills, fevers, sweats  Respiratory:  negative for cough, dyspnea on exertion, hemoptysis,  wheezing  Cardiovascular: lower extremity edema, palpitations  Gastrointestinal:  negative for abdominal pain, constipation, diarrhea, nausea, vomiting  Neurological:  negative for dizziness, headache    BRIEF HISTORY     The patient is a pleasant 55 y.o. 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 Patient states she has had increased shortness of breath the past 2 nights, unable to wear her CPAP nightly with increased fatigue. Patient is poor historian, states she wears 7 L O2 at home, however EMS on arrival saw patient on 4 L and was saturating in the mid 80s. Patient was placed on nonrebreather by EMS and saturations improved to 98%.     Of note, patient had previous hospitalization earlier this month for COPD exacerbation complicated by pneumonia. At that time patient was treated with antibiotics and steroids.   Pulmonology was consulted and patient was approved for trilogy noninvasive ventilator which she has yet to receive. Echocardiogram at that time showed EF 60%, \"D\" sign indicating RV pressure overload, moderate to severe MR, severe pulmonary hypertension. Patient is aware of these findings.     On my evaluation, patient resting comfortably on 15 L nonrebreather mask acute distress. Patient is morbidly obese with BMI 57. Denies any cough, chest pain, leg pain/swelling. She does have skin irritation on her buttocks. States she has been compliant with all meds (inculding bronchodilators and bumex 2mg BID) except for her PO DM meds as those were held on previous admission. Afebrile, hemodynamic stable, tachycardic in the 110s at present. Patient received prednisone and lasix 40mg IV in ED as well as rocephin and vancomycin. CXR in ED - limited negative CXR d/t body habitus.     Significant labs include proBNP on admission 4192, was in 1000s on recent discharge. Leukocytosis of 17.0. Hemoglobin 9.7. Slight bump in creatinine from baseline- 1.21. glucose 238.       4/10/2022-episode of hypoxia, patient drank 3.5 L of fluid admitted with CHF exacerbation, getting a chest x-ray, IV diuretics to continue, labs de-escalate, prednisone discontinued. 2022 - Patient has drank 2.5 L in the last 24 hours. Admitted with CHF exacerbation, currently on IV Lasix 80 mg IV twice daily. Did receive this morning. We will get a chest x-ray to look for any worsening pulmonary edema. Mild edema bilateral lower extremities noted.   Labs Descalated, prednisone discontinued      OBJECTIVE     Vital Signs:  /79   Pulse 68   Temp 98.2 °F (36.8 °C) (Axillary)   Resp 14   Ht 5' 6\" (1.676 m)   Wt (!) 342 lb 9.5 oz (155.4 kg)   SpO2 94%   BMI 55.30 kg/m²     Temp (24hrs), Av °F (36.7 °C), Min:97.4 °F (36.3 °C), Max:98.3 °F (36.8 °C)    In: 600   Out: 4150 [Urine:4150]    Physical Exam:  Constitutional: This is a well developed, well nourished, Greater than 40 - Morbid Obesity / Extreme Obesity / Grade III 55y.o. year old female who is alert, oriented, cooperative and in no apparent distress. Head:normocephalic and atraumatic. EENT:  PERRLA. No conjunctival injections. Septum was midline, mucosa was without erythema, exudates or cobblestoning. No thrush was noted. Neck: Supple without thyromegaly. No elevated JVP. Trachea was midline. Respiratory: Due to body habitus auscultation is very limited and mild diminished breath sounds bilaterally. No wheezing. Cardiovascular: Regular without murmur, clicks, gallops or rubs. Abdomen: Slightly rounded and soft without organomegaly. No rebound, rigidity or guarding was appreciated. Lymphatic: No lymphadenopathy. Musculoskeletal: Normal curvature of the spine. No gross muscle weakness. Extremities: Mild bilateral lower extremity edema  Skin:  Warm and dry. Good color, turgor and pigmentation. No lesions or scars.   No cyanosis or clubbing  Neurological/Psychiatric: The patient's general behavior, level of consciousness, thought content and emotional status is normal.        Medications:  Scheduled Medications:    lidocaine  1 patch TransDERmal Daily    insulin glargine  20 Units SubCUTAneous Nightly    insulin lispro  0-18 Units SubCUTAneous TID WC    insulin lispro  0-9 Units SubCUTAneous Nightly    sertraline  100 mg Oral Daily    albuterol  2.5 mg Nebulization 4x daily    levofloxacin  750 mg IntraVENous Q24H    predniSONE  20 mg Oral Daily    amLODIPine  5 mg Oral Daily    atorvastatin  40 mg Oral Nightly    budesonide-formoterol  2 puff Inhalation BID    furosemide  80 mg IntraVENous BID    ferrous sulfate  325 mg Oral BID WC    folic acid  1 mg Oral Daily    hydrALAZINE  25 mg Oral 3 times per day    isosorbide dinitrate  20 mg Oral TID    pantoprazole  40 mg Oral QAM AC    spironolactone  25 mg Oral Daily    tiotropium  2 puff Inhalation Daily    vitamin B-12  1,000 mcg Oral Daily    sodium chloride flush  10 mL IntraVENous 2 times per day    heparin (porcine)  5,000 Units SubCUTAneous 3 times per day    gabapentin  300 mg Oral TID     Continuous Infusions:    sodium chloride Stopped (04/09/22 0636)    dextrose       PRN Medicationsacetaminophen, 650 mg, Q4H PRN  sodium chloride, 1 spray, PRN  albuterol sulfate HFA, 2 puff, Q6H PRN  diazePAM, 5 mg, Q12H PRN  sodium chloride flush, 10 mL, PRN  sodium chloride, , PRN  magnesium sulfate, 1,000 mg, PRN  ondansetron, 4 mg, Q8H PRN   Or  ondansetron, 4 mg, Q6H PRN  polyethylene glycol, 17 g, Daily PRN  glucose, 15 g, PRN  dextrose, 12.5 g, PRN  glucagon (rDNA), 1 mg, PRN  dextrose, 100 mL/hr, PRN  albuterol, 2.5 mg, As Directed RT PRN        Diagnostic Labs:  CBC:   Recent Labs     04/11/22  1020 04/12/22  0507 04/13/22  0552   WBC 9.2 9.6 11.1   RBC 3.32* 3.32* 3.31*   HGB 8.7* 8.7* 8.5*   HCT 29.8* 29.5* 28.8*   MCV 89.8 88.9 87.0   RDW 15.7* 15.5* 15.9*    254 243     BMP:   Recent Labs     04/11/22  0909 04/12/22  0507 04/13/22  0552   * 137 137   K 3.6* 4.6 3.4*   CL 92* 93* 87*   CO2 30 33* 36*   BUN 40* 41* 34*   CREATININE 1.31* 1.28* 1.21*     BNP: No results for input(s): BNP in the last 72 hours. PT/INR: No results for input(s): PROTIME, INR in the last 72 hours. APTT: No results for input(s): APTT in the last 72 hours. CARDIAC ENZYMES: No results for input(s): CKMB, CKMBINDEX, TROPONINI in the last 72 hours. Invalid input(s): CKTOTAL;3  FASTING LIPID PANEL:  Lab Results   Component Value Date    CHOL 144 11/17/2018    HDL 42 10/07/2020    TRIG 68 11/17/2018     LIVER PROFILE:   No results for input(s): AST, ALT, ALB, BILIDIR, BILITOT, ALKPHOS in the last 72 hours. MICROBIOLOGY:   Lab Results   Component Value Date/Time    CULTURE NO GROWTH 4 DAYS 04/09/2022 02:22 AM       Imaging:    XR CHEST PORTABLE    Result Date: 4/8/2022  Limited negative portable chest radiograph.      ECHO Complete 2D W Doppler W Color    Result Date: 3/22/2022  Transthoracic Echocardiography Report (TTE)  Patient Name PRICE       Date of Study               03/22/2022               Nadege Royals   Date of      1975  Gender                      Female  Birth   Age          55 year(s)  Race                        Black   Room Number  2008        Height:                     65.98 inch, 167.6 cm   Corporate ID [id number]    Weight:                     380 pounds, 172.4 kg  #   Patient Acct [de-identified]   BSA:          2.63 m^2      BMI:      61.36  #                                                              kg/m^2   MR #         [id number]     Sonographer                 Fernando Roth   Accession #  [id number]  Interpreting Physician      [address] [place] [address]   Fellow                   Referring Nurse                           Practitioner   Interpreting             Referring Physician         [facility]  Fellow  Additional Comments Technically difficult study. Type of Study   TTE procedure:2D Echocardiogram, M-Mode, Doppler, Color Doppler. Procedure Date Date: 03/22/2022 Start: 03:37 PM Study Location: [facility] Technical Quality: Adequate visualization Indications:Dyspnea/SOB. History / Tech. Comments: Echo done at patient bedside. Procedure explained to patient. HTN, COPD, PHTN, STEPH, HX NSTEMI Patient Status: Inpatient Height: 65.98 inches Weight: 380 pounds BSA: 2.63 m^2 BMI: 61.36 kg/m^2 Allergies   - Aspirin.   - Sulfa. CONCLUSIONS Summary Left ventricle is normal in size. Global left ventricular systolic function is normal. Calculated ejection fraction 60% by Heart Model. Marked Leftward compression of inter-ventricular septum (\"D-sign\") indicating RV volume or pressure overload. Right ventricular function appears reduced. Moderately dilated right ventricular cavity. Moderate to severe tricuspid regurgitation. Severe pulmonary hypertension. Estimated right ventricular systolic pressure is 06DDBG.  Trivial pulmonic insufficiency. Signature ----------------------------------------------------------------------------  Electronically signed by Juliet Anderson(Sonographer) on 2022  04:14 PM ---------------------------------------------------------------------------- ----------------------------------------------------------------------------  Electronically signed by Don Ellington(Interpreting physician) on 2022  11:48 AM ---------------------------------------------------------------------------- FINDINGS Left Atrium Left atrium is normal in size. Left Ventricle Left ventricle is normal in size. Global left ventricular systolic function is normal. Calculated ejection fraction 60% by Heart Model. Marked Leftward compression of inter-ventricular septum (\"D-sign\") indicating RV volume or pressure overload. Right Atrium Right atrium is normal in size. Right Ventricle Right ventricular function appears reduced. Moderately dilated right ventricular cavity. Mitral Valve Normal mitral valve structure and function. No mitral regurgitation. Aortic Valve Aortic valve is trileaflet and opens adequately. No aortic insufficiency. Tricuspid Valve No obvious valvular abnormality. Moderate to severe tricuspid regurgitation. Severe pulmonary hypertension. Estimated right ventricular systolic pressure is 27JFHG. Pulmonic Valve The pulmonic valve is normal in structure. Trivial pulmonic insufficiency. Pericardial Effusion No pericardial effusion seen. Miscellaneous E/E' average = 7.1. IVC normal diameter & inspiratory collapse indicating normal RA filling pressure .  M-mode / 2D Measurements & Calculations:   LVIDd:3.4 cm(3.7 - 5.6 cm)       Diastolic XYOBFT:58.6 ml  KMKEK:3.2 cm(2.2 - 4.0 cm)       Systolic VANQAM:80.6 ml  HBUY:0.8 cm(0.6 - 1.1 cm)        Aortic Root:2.8 cm(2.0 - 3.7 cm)  LVPWd:1 cm(0.6 - 1.1 cm)         LA Dimension: 2.4 cm(1.9 - 4.0 cm)  Fractional Shortenin.24 %    LA volume/Index: 47.93 ml /18m^2  Calculated LVEF (%): 60.84 %     LVOT:1.7 cm                                   RVDd:4.91 cm   Mitral:                                 Aortic   Valve Area (P1/2-Time): 3.86 cm^2       Peak Velocity: 1.66 m/s  Peak E-Wave: 0.75 m/s                   Mean Velocity: 1.04 m/s  Peak A-Wave: 0.60 m/s                   Peak Gradient: 11.02 mmHg  E/A Ratio: 1.25                         Mean Gradient: 5 mmHg  Peak Gradient: 2.25 mmHg  Mean Gradient: 2 mmHg  Deceleration Time: 195 msec             Area (continuity): 1.37 cm^2  P1/2t: 57 msec                          AV VTI: 29.9 cm   Area (continuity): 1.96 cm^2  Mean Velocity: 0.75 m/s   Tricuspid:                              Pulmonic:   Peak TR Velocity: 4.34 m/s              Peak Velocity: 1.22 m/s  Peak TR Gradient: 75.3424 mmHg          Peak Gradient: 5.95 mmHg  Diastology / Tissue Doppler Septal Wall E' velocity:0.10 m/s Septal Wall E/E':7.7 Lateral Wall E' velocity:0.11 m/s Lateral Wall E/E':6.6    XR ABDOMEN (KUB) (SINGLE AP VIEW)    Result Date: 4/1/2022  EXAMINATION: ONE SUPINE XRAY VIEW(S) OF THE ABDOMEN 4/1/2022 12:45 pm COMPARISON: CT dated 05/08/2019. HISTORY: ORDERING SYSTEM PROVIDED HISTORY: Constipation, right lower abdominal  pain TECHNOLOGIST PROVIDED HISTORY: Constipation, right lower abdominal  pain Reason for Exam: supine FINDINGS: Nonspecific bowel gas pattern is seen with gaseous distension of the stomach. Mild-to-moderate amount of stool is noted in the colon. Evaluation of free air is limited on this supine view. There appears to be Trujillo catheter in place. Nonspecific bowel gas pattern with mild-to-moderate amount of stool noted in the colon. Gaseous distention of the stomach. XR CHEST PORTABLE    Result Date: 4/10/2022  EXAMINATION: ONE XRAY VIEW OF THE CHEST 4/10/2022 9:12 am COMPARISON: 04/08/2022 HISTORY: Reason for Exam: Acute hypoxia admitted with Pul edema FINDINGS: Stable cardiomegaly. Mild central vascular congestion.   Interval increased right perihilar/infrahilar opacity. No sizable effusion or discernible pneumothorax. Osseous structures grossly intact. *Stable cardiomegaly with mild central vascular congestion. *Interval increased right perihilar/infrahilar opacity which may represent developing pneumonia. XR CHEST PORTABLE    Result Date: 4/8/2022  EXAMINATION: ONE XRAY VIEW OF THE CHEST 4/8/2022 4:37 pm COMPARISON: 03/22/2022 HISTORY: ORDERING SYSTEM PROVIDED HISTORY: Shortness of breath, COPD and CHF, recent admission for pneumonia TECHNOLOGIST PROVIDED HISTORY: Shortness of breath, COPD and CHF, recent admission for pneumonia Reason for Exam: upr,sob, FINDINGS: Examination is limited by the patient's body habitus and by portable technique. Cardial pericardial silhouette is prominent but stable. There are low lung volumes is secondary bronchovascular crowding. No focal infiltrate is identified. No pneumothorax. No free air. No acute bony abnormality. Limited negative portable chest radiograph. XR CHEST PORTABLE    Result Date: 3/22/2022  EXAMINATION: ONE XRAY VIEW OF THE CHEST 3/22/2022 9:30 am COMPARISON: 03/19/2022 HISTORY: ORDERING SYSTEM PROVIDED HISTORY: improvement in pnuemonia TECHNOLOGIST PROVIDED HISTORY: improvement in pnuemonia Reason for Exam: ap uprt port chest FINDINGS: As compared to prior examination, there has been significant improvement in the right lower lobe infiltrate. Lungs appear clear. There is cardiomegaly noted. Bony structures unremarkable. Improvement in the the right basal infiltrate. XR CHEST PORTABLE    Result Date: 3/19/2022  EXAMINATION: ONE XRAY VIEW OF THE CHEST 3/19/2022 12:51 am COMPARISON: CT PA performed approximately 10 hours earlier. Portable chest obtained approximately 12 hours earlier.  HISTORY: ORDERING SYSTEM PROVIDED HISTORY: Increasing O2 requirment TECHNOLOGIST PROVIDED HISTORY: Increasing O2 requirment Reason for Exam: shortness of breath, chest pain off and on upright port FINDINGS: Mild cardiomegaly and normal pulmonary vasculature. Right worse than left bibasilar patchy airspace opacities which appear worse than exam 12 hours earlier. No pneumothorax or pleural effusion. Surrounding structures are unremarkable. Findings suggestive of worsening bibasilar pneumonia. XR CHEST PORTABLE    Result Date: 3/18/2022  EXAMINATION: ONE XRAY VIEW OF THE CHEST 3/18/2022 11:16 am COMPARISON: Chest x-ray dated 29 June 2021 HISTORY: ORDERING SYSTEM PROVIDED HISTORY: sob TECHNOLOGIST PROVIDED HISTORY: sob FINDINGS: Mild stable cardiomegaly with pulmonary vascular congestion. No pneumothorax or pleural effusion. Stable cardiomegaly with mild pulmonary vascular congestion. CT CHEST PULMONARY EMBOLISM W CONTRAST    Result Date: 3/18/2022  EXAMINATION: CTA OF THE CHEST 3/18/2022 1:23 pm TECHNIQUE: CTA of the chest was performed after the administration of intravenous contrast.  Multiplanar reformatted images are provided for review. MIP images are provided for review. Dose modulation, iterative reconstruction, and/or weight based adjustment of the mA/kV was utilized to reduce the radiation dose to as low as reasonably achievable. COMPARISON: None HISTORY: ORDERING SYSTEM PROVIDED HISTORY: sedentary lifestyle, leg swelling, increased O2 TECHNOLOGIST PROVIDED HISTORY: sedentary lifestyle, leg swelling, increased O2 Decision Support Exception - unselect if not a suspected or confirmed emergency medical condition->Emergency Medical Condition (MA) Reason for Exam: sedentary lifestyle, leg swelling, increased O2 FINDINGS: Pulmonary Arteries: Pulmonary arteries are adequately opacified for evaluation. No evidence of intraluminal filling defect to suggest pulmonary embolism. Main pulmonary artery is normal in caliber. Mediastinum: No evidence of mediastinal lymphadenopathy. Small reactive lymph nodes seen in the mediastinum and hilar regions.   The heart and pericardium demonstrate no acute abnormality. There is no acute abnormality of the thoracic aorta. Lungs/pleura: Patchy ground-glass opacity and increased markings seen in the lower lung fields bilaterally concerning for bibasilar infiltrate. No pleural effusion or pneumothorax. No pulmonary mass or nodule. Patchy ground-glass opacity in the upper lung fields bilaterally. Upper Abdomen: Limited images of the upper abdomen are unremarkable. Soft Tissues/Bones: No acute bone or soft tissue abnormality. Degenerative changes seen within the spine with no chest wall abnormality. No evidence of pulmonary embolism. There is patchy ill-defined opacification lower lung fields bilaterally suggesting infiltrate with ground-glass opacity concerning for pneumonia as well in the upper lung fields. Reactive adenopathy throughout the mediastinum and hilar regions. RECOMMENDATIONS: Unavailable     VL DUP LOWER EXTREMITY VENOUS BILATERAL    Result Date: 3/19/2022    [facility]  Vascular Lower Extremities DVT Study Procedure   Patient Name  CARMEN       Date of Study           03/18/2022                [address]   Date of Birth 1975  Gender                  Female   Age           55 year(s)  Race                    Black   Room Number   2008        Height:                 65.98 inch, 167.6 cm   Corporate ID  [id number]    Weight:                 380 pounds, 172.4 kg  #   Patient Acct  [de-identified]   BSA:        2.63 m^2    BMI:       61.36 kg/m^2  #   MR #          [id number]     Sonographer             Silvia Flynn   Accession #   [id number]  Interpreting Physician  Zulema Borges   Referring                 Referring Physician     Kyler Lubin DO  Nurse  Practitioner  Procedure Type of Study:   Veins: Lower Extremities DVT Study, Venous Scan Lower Bilateral.  Indications for Study:Leg Swelling and Shortness of breath. Patient Status:ER. Technical Quality:Limited visualization.  Limitation reason:bedside exam , body habitus, deep vessels, edema. Conclusions   Summary   No evidence of superficial or deep venous thrombosis in both lower  extremities. Signature   ----------------------------------------------------------------  Electronically signed by NIKKI Perry(Sonographer) on  03/18/2022 01:27 PM  ----------------------------------------------------------------   ----------------------------------------------------------------  Electronically signed by Alphia Madden Reyes,Arthur(Interpreting  physician) on 03/19/2022 07:28 AM  ----------------------------------------------------------------  Findings:   Right Impression:                    Left Impression:  The common femoral, femoral,         The common femoral, femoral,  popliteal and tibial veins           popliteal and tibial veins  demonstrate normal compressibility   demonstrate normal compressibility  and augmentation. and augmentation. Normal compressibility of the great  Normal compressibility of the great  saphenous vein. saphenous vein. Normal compressibility of the small  Normal compressibility of the small  saphenous vein. saphenous vein. Limited visualization of the         Limited visualization of the  posterior tibial and peroneal veins. posterior tibial and peroneal veins. Risk Factors History +-------------------------------------------+----------+-------------------+ ! Diagnosis                                  ! Date      ! Comments           ! +-------------------------------------------+----------+-------------------+ ! Previous Scan                              !04/04/2008! WNL                ! +-------------------------------------------+----------+-------------------+ ! Chronic lung disease->COPD                 !          !                   ! +-------------------------------------------+----------+-------------------+ ! Previous Scan                              !09/22/2014! Bilateral WNL      ! +-------------------------------------------+----------+-------------------+ ! CHF                                        ! !                   ! +-------------------------------------------+----------+-------------------+   - The patient's risk factor(s) include: chronic lung disease, dyslipidemia     and arterial hypertension.   - The patient has a former tobacco history. Allergies   - Allergy:Aspirin(Drug). - Allergy:Sulfa(Drug). Velocities are measured in cm/s ; Diameters are measured in cm Right Lower Extremities DVT Study Measurements Right 2D Measurements +------------------------------------+----------+---------------+----------+ ! Location                            ! Visualized! Compressibility! Thrombosis! +------------------------------------+----------+---------------+----------+ ! Common Femoral                      !Yes       ! Yes            ! None      ! +------------------------------------+----------+---------------+----------+ ! Prox Femoral                        !Yes       ! Yes            ! None      ! +------------------------------------+----------+---------------+----------+ ! Mid Femoral                         !Partial   !Yes            ! None      ! +------------------------------------+----------+---------------+----------+ ! Dist Femoral                        !Partial   !Yes            ! None      ! +------------------------------------+----------+---------------+----------+ ! Popliteal                           !Yes       ! Yes            ! None      ! +------------------------------------+----------+---------------+----------+ ! Sapheno Femoral Junction            ! Yes       ! Yes            ! None      ! +------------------------------------+----------+---------------+----------+ ! PTV                                 ! Partial   !Yes            ! None      ! +------------------------------------+----------+---------------+----------+ ! Peroneal                            !Partial !Yes            !None      ! +------------------------------------+----------+---------------+----------+ ! Gastroc                             ! Yes       ! Yes            ! None      ! +------------------------------------+----------+---------------+----------+ ! GSV Thigh                           ! Yes       ! Yes            ! None      ! +------------------------------------+----------+---------------+----------+ ! GSV Knee                            ! Yes       ! Yes            ! None      ! +------------------------------------+----------+---------------+----------+ ! GSV Ankle                           ! Yes       ! Yes            ! None      ! +------------------------------------+----------+---------------+----------+ ! SSV                                 ! Yes       ! Yes            ! None      ! +------------------------------------+----------+---------------+----------+ Right Doppler Measurements +--------------------------+---------+------+------------------------------+ ! Location                  ! Signal   !Reflux! Reflux (msec)                 ! +--------------------------+---------+------+------------------------------+ ! Common Femoral            !Pulsatile!      !                              ! +--------------------------+---------+------+------------------------------+ ! Prox Femoral              !Pulsatile!      !                              ! +--------------------------+---------+------+------------------------------+ ! Popliteal                 !Pulsatile!      !                              ! +--------------------------+---------+------+------------------------------+ Left Lower Extremities DVT Study Measurements Left 2D Measurements +------------------------------------+----------+---------------+----------+ ! Location                            ! Visualized! Compressibility! Thrombosis! +------------------------------------+----------+---------------+----------+ ! Common Femoral                      !Yes       ! Yes !None      ! +------------------------------------+----------+---------------+----------+ ! Prox Femoral                        !Yes       ! Yes            ! None      ! +------------------------------------+----------+---------------+----------+ ! Mid Femoral                         !Partial   !Yes            ! None      ! +------------------------------------+----------+---------------+----------+ ! Dist Femoral                        !Partial   !Yes            ! None      ! +------------------------------------+----------+---------------+----------+ ! Popliteal                           !Yes       ! Yes            ! None      ! +------------------------------------+----------+---------------+----------+ ! Sapheno Femoral Junction            ! Yes       ! Yes            ! None      ! +------------------------------------+----------+---------------+----------+ ! PTV                                 ! Partial   !Yes            ! None      ! +------------------------------------+----------+---------------+----------+ ! Peroneal                            !Partial   !Yes            ! None      ! +------------------------------------+----------+---------------+----------+ ! Gastroc                             ! Yes       ! Yes            ! None      ! +------------------------------------+----------+---------------+----------+ ! GSV Thigh                           ! Yes       ! Yes            ! None      ! +------------------------------------+----------+---------------+----------+ ! GSV Knee                            ! Yes       ! Yes            ! None      ! +------------------------------------+----------+---------------+----------+ ! GSV Ankle                           ! Yes       ! Yes            ! None      ! +------------------------------------+----------+---------------+----------+ ! SSV                                 ! Yes       ! Yes            ! None      ! +------------------------------------+----------+---------------+----------+ Left Doppler Measurements +--------------------------+---------+------+------------------------------+ ! Location                  ! Signal   !Reflux! Reflux (msec)                 ! +--------------------------+---------+------+------------------------------+ ! Common Femoral            !Pulsatile!      !                              ! +--------------------------+---------+------+------------------------------+ ! Prox Femoral              !Pulsatile!      !                              ! +--------------------------+---------+------+------------------------------+ ! Popliteal                 !Pulsatile!      !                              ! +--------------------------+---------+------+------------------------------+    FL MODIFIED BARIUM SWALLOW W VIDEO    Result Date: 3/19/2022  EXAMINATION: MODIFIED BARIUM SWALLOW WAS PERFORMED IN CONJUNCTION WITH SPEECH PATHOLOGY SERVICES TECHNIQUE: Fluoroscopic evaluation of the swallowing mechanism was performed using cineradiography with multiple consistency of barium product in conjunction with speech pathology services. FLUOROSCOPY DOSE AND TYPE OR TIME AND EXPOSURES: Fluoro time: 1.1 minute DAP: 22.570 mGy COMPARISON: None HISTORY: ORDERING SYSTEM PROVIDED HISTORY: h/o esophageal stricture per patient TECHNOLOGIST PROVIDED HISTORY: h/o esophageal stricture per patient 20-year-old female with history of esophageal stricture FINDINGS: No penetration or aspiration with the thin liquid and thick liquid substance by straw, pureed/pudding thick, soft solid and cookie solid substances. No penetration or aspiration with the above administered substances. Please see separate speech pathology report for full discussion of findings and recommendations.          ASSESSMENT & PLAN     ASSESSMENT / PLAN:     Principal Problem:    CHF (congestive heart failure), NYHA class I, acute on chronic, combined (HCC)  Active Problems:    Asthma    HTN (hypertension)    Acute respiratory failure with hypoxia and hypercapnia (HCC)    Morbid obesity with BMI of 50.0-59.9, adult (HCC)    Obesity hypoventilation syndrome (HCC)    STEPH (obstructive sleep apnea)    Pulmonary hypertension (HCC)    Normocytic anemia    Prediabetes    Hyperlipidemia  Resolved Problems:    * No resolved hospital problems. *      Acute hypoxic respiratory failure likely secondary to pulmonary edema with underlying  chronic congestive heart failure  - Echo from 3/22/2022 shows EF 60 %  moderately dilated right ventricle. Moderate to severe tricuspid regurgitation. Severe pulmonary hypertension with estimated right ventricular systolic pressure 80 mmHg      -Continue with IV diuresis furosemide 80 mg IV twice daily to be continued on discharge per Dr. Darby Cali. CXR 4/10/22   *Interval increased right perihilar/infrahilar opacity which may represent   developing pneumonia.     -Continue with BiPAP overnight  -Strict I&O's, fluid restriction to 1.5 L      History of asthma - Pulmonology on board. Recommend to continue on bronchodilators-albuterol, Spiriva and LABA plus ICS    STEPH/OHS- bipap nightly    Essential HTN  Continue home meds. Anemia of chronic disease Ferritin elevated at 327. iron 36, TIBC 239, iron saturation 15. Hemoglobin 8.7 and stable. Monitor H&H to monitor for any signs of bleeding. pre-DM  a1c 6.4  Med dose sliding scale  Insulin Lantus 10-->20 U SC nightly  Patient was not taking PO meds  POCT glucose 4xdaily    DVT ppx: heparin  GI ppx: protonix   Diet: Regular    PT/OT/SW : On board. Discharge Planning:  Needs LTAC  placement discharge planning in progress    Varsha Melton MD  Internal Medicine Resident, PGY-1  [facility]; [place], [place]  4/13/2022, 8:32 AM      Attending Physician Statement  I have discussed the care of [address] [place] [address] and I have examined the patient myselft and taken ros and hpi , including pertinent history and exam findings,  with the resident.  I have reviewed the key elements of all parts of the encounter with the resident. I agree with the assessment, plan and orders as documented by the resident.   AC on ch hypoxic resp failure , on high flow 70%, 30L oxygen  STEPH/OHS  COPD   Severe Pulm HTN   MO   PNA,   Anemia of CD   Ac CHF   Inhalers Symbicort/spiriva, alb prn   Trilogy advised, insurance to approve   Palliative care consult appreciated   Overall prognosis very poor     Electronically signed by Zen Kenney MD

## 2022-04-13 NOTE — PROGRESS NOTES
Spoke with patient regarding request to transfer to [facility] as she is feeling anxious about discharge. Writer spoke with patient at length about her stay, her plan of care and destination. She stated she feels like she should stay here and review her options for discharge with the care navigator.

## 2022-04-14 NOTE — PROGRESS NOTES
Patient Robert Blount   MRN -  [id number]   [facility]   - 1975        Date of evaluation -  2022    REASON FOR THE CONSULTATION:  Chief Complaint   Patient presents with    Respiratory Distress     Pt arrives per EMS with difficulty breathing. Pt recently treated for pneumonia. Pt given 40mg prednisone per EMS and placed on NRB. HISTORY OF PRESENT ILLNESS:    Koffi Means is a 55y.o. 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 Patient was discharged home recently on supplemental oxygen and insurance had not approved trilogy so she was sent home with her home CPAP. Patient was brought back to the hospital because she was having worsening shortness of breath. Unable to use her CPAP because of fatigue. On arrival EMS found that her saturation was in mid [de-identified]. She was placed on nonrebreather brought to the ER. Pulmonary has been consulted because of high flow requirement and hypoxia. Patient is laying in bed, she is on high flow oxygen 80%, using BiPAP at night. Her BNP was elevated. She is under going diuresis,   On Symbicort, Spiriva and albuterol.        2022   Subjective      No acute events   On 30 l 55 % high flow       PAST MEDICAL HISTORY:       Diagnosis Date    Acute on chronic diastolic CHF (congestive heart failure) ([facility] 09/15/2018    HIEN (acute kidney injury) ([facility] 2019    HIEN (acute kidney injury) ([facility] [facility] 2018    Asthma     CHF     Chronic obstructive lung disease (HCC)     Chronic respiratory failure with hypoxia (HCC)     on home O2 therapy    COPD     Diabetes mellitus, new onset ([address] 2019    Former smoker     quit smoking about 17 months ago/ She has a 22.00 pack-year smoking history    HTN     Hyperglycemia     Hyperlipidemia     Hypertension     Morbid obesity with BMI of 60.0-69.9, adult ([address]     Neuromuscular disorder ([address]     Neuropathy Right hand    STEPH on CPAP     DME per Dr. Canseco Leak for CPAP of 18 cmh2o    Oxygen dependent     DME 06/2018 for home oxgyen at 2.5-3 lpm ATC    Pedal edema     Pneumonia     Pulmonary hypertension, moderate to severe ([address] 06/09/2018    Torn meniscus     Wears glasses          Family History:       Problem Relation Age of Onset    Emphysema Mother     Alzheimer's Disease Mother     Other Mother         Blood disorder    High Blood Pressure Father     Arthritis Father     Diabetes Sister     Heart Failure Sister     Kidney Disease Sister     High Blood Pressure Brother     Dementia Maternal Grandmother     High Blood Pressure Maternal Grandmother     Dementia Maternal Grandfather     High Blood Pressure Maternal Grandfather     Cancer Paternal Grandmother     Heart Disease Paternal Grandfather     Diabetes Sister     Heart Failure Sister     Other Sister         Intestinal problems    No Known Problems Sister     No Known Problems Son        SURGICAL HISTORY:   Past Surgical History:   Procedure Laterality Date    ABDOMEN SURGERY      Patient had a PEG tube placed 1/2018, removed 4/2018    [id number]  June 5, 2019    CYSTOSCOPY N/A 6/5/2019    [facility] at [facility]  02/2018    Removed in April 2018    [facility] CATH POWER PICC TRIPLE  2/2/2018         JOINT REPLACEMENT      MO OFFICE/OUTPT VISIT,PROCEDURE ONLY N/A 2/17/2018    TRACHEOTOMY performed by Eve Cintron MD at [address]  03/2018    TRACHEOTOMY  02/2018           SOCIAL AND OCCUPATIONAL HEALTH:   Social History     Socioeconomic History    Marital status: Single     Spouse name: None    Number of children: None    Years of education: None    Highest education level: None   Occupational History    None   Tobacco Use    Smoking status: Former Smoker     Packs/day: 1.00     Years: 22.00     Pack years: 22.00     Types: Cigarettes     Start date: 18     Quit date: 1/15/2018     Years since quittin.2    Smokeless tobacco: Never Used   Vaping Use    Vaping Use: Never used   Substance and Sexual Activity    Alcohol use: No    Drug use: No    Sexual activity: Not Currently   Other Topics Concern    None   Social History Narrative    ** Merged History Encounter **          Social Determinants of Health     Financial Resource Strain: Low Risk     Difficulty of Paying Living Expenses: Not hard at all   Food Insecurity: Food Insecurity Present    Worried About [facility] in the Last Year: Never true    Obdulio of Food in the Last Year: Sometimes true   Transportation Needs: No Transportation Needs    Lack of Transportation (Medical): No    Lack of Transportation (Non-Medical): No   Physical Activity: Inactive    Days of Exercise per Week: 0 days    Minutes of Exercise per Session: 0 min   Stress: Stress Concern Present    Feeling of Stress : To some extent   Social Connections: Moderately Integrated    Frequency of Communication with Friends and Family: Three times a week    Frequency of Social Gatherings with Friends and Family: Three times a week    Attends Yarsani Services: 1 to 4 times per year    Active Member of [address] [place] [address] or Organizations: No    Attends Club or Organization Meetings: 1 to 4 times per year    Marital Status: Never    Intimate Partner Violence: Not At Risk    Fear of Current or Ex-Partner: No    Emotionally Abused: No    Physically Abused: No    Sexually Abused: No   Housing Stability: Unknown    Unable to Pay for Housing in the Last Year: No    Number of [place] in the Last Year: Not on file    Unstable Housing in the Last Year: No        TOBACCO:   reports that she quit smoking about 4 years ago. Her smoking use included cigarettes. She started smoking about 27 years ago. She has a 22.00 pack-year smoking history.  She has never used smokeless tobacco.  ETOH:   reports no history of alcohol use. ALLERGIES:    Allergies   Allergen Reactions    Bee Venom Anaphylaxis     Last bee sting when patient was at 11years of age   Junior Khoury Sulfa Antibiotics Anaphylaxis    Asa [Aspirin]     Aspirin Other (See Comments)     HEART PALPATATIONS      Beta Adrenergic Blockers Other (See Comments)     Asystole and Bradycardia on admission  01/26/2018-2/22/2018 at [facility]    Beta Adrenergic Blockers Other (See Comments)     UNKNOWN      Sulfa Antibiotics        Home Meds:   Prior to Admission medications    Medication Sig Start Date End Date Taking? Authorizing Provider   gabapentin (NEURONTIN) 300 MG capsule Take 1 capsule by mouth 3 times daily for 10 days. 4/13/22 4/23/22 Yes Tesha Murphy MD   predniSONE (DELTASONE) 20 MG tablet Take 1 tablet by mouth daily for 3 doses 4/14/22 4/17/22 Yes Tesha Murphy MD   amLODIPine (NORVASC) 5 MG tablet Take 1 tablet by mouth daily 3/28/22   [place] MD Lonnie   diazePAM (VALIUM) 5 MG tablet Take 5 mg by mouth every 12 hours as needed for Anxiety.     Historical Provider, MD   folic acid (FOLVITE) 1 MG tablet Take 1 mg by mouth daily    Historical Provider, MD   vitamin B-12 (CYANOCOBALAMIN) 1000 MCG tablet Take 1,000 mcg by mouth daily    Historical Provider, MD   Dulaglutide (TRULICITY) 9.32 KZ/9.7HF SOPN Inject 0.75 mg into the skin once a week    Historical Provider, MD   metOLazone (ZAROXOLYN) 2.5 MG tablet Take 2.5 mg by mouth every other day    Historical Provider, MD   glipiZIDE (GLUCOTROL XL) 2.5 MG extended release tablet Take 2.5 mg by mouth  Patient not taking: Reported on 4/9/2022    Historical Provider, MD   SM MUCUS RELIEF 600 MG extended release tablet  10/15/19   Historical Provider, MD   nystatin (MYCOSTATIN) 679334 UNIT/GM cream  10/17/19   Historical Provider, MD   Ergocalciferol (VITAMIN D2 PO) Take 50,000 Units by mouth once a week  Patient not taking: Reported on 3/18/2022    Historical Provider, MD   potassium chloride (KLOR-CON M) 10 MEQ extended release tablet Take 1 tablet by mouth daily 7/25/19   Lamin Jaimes MD   bumetanide (BUMEX) 1 MG tablet Take 2 mg by mouth 2 times daily    Historical Provider, MD   JANUVIA 50 MG tablet Take 50 mg by mouth daily 6/11/19   Historical Provider, MD   glucose monitoring kit (FREESTYLE) monitoring kit 1 kit by Does not apply route daily 5/8/19   Savannah Roy MD   blood glucose monitor strips Test three  times a day & as needed for symptoms of irregular blood glucose.  5/8/19   Savannah Roy MD   Lancets MISC 1 each by Does not apply route daily 5/8/19   Savannah Roy MD   pantoprazole sodium (PROTONIX) 40 MG PACK packet Take 40 mg by mouth every morning (before breakfast)    Historical Provider, MD   spironolactone (ALDACTONE) 25 MG tablet Take 1 tablet by mouth daily 11/22/18   Anshul Stark MD   isosorbide dinitrate (ISORDIL) 20 MG tablet Take 1 tablet by mouth 3 times daily 9/20/18   Davie Acevedo MD   hydrALAZINE (APRESOLINE) 25 MG tablet Take 1 tablet by mouth every 8 hours 9/20/18   Davie Acevedo MD   Blood Pressure KIT 1 Device by Does not apply route 2 times daily 9/20/18   Tolu Guerra MD   ferrous sulfate 325 (65 Fe) MG EC tablet Take 325 g by mouth 2 times daily (with meals) 4/20/18   Historical Provider, MD   loratadine (CLARITIN) 10 MG tablet Take 10 mg by mouth daily    Historical Provider, MD   tiotropium (SPIRIVA) 18 MCG inhalation capsule Inhale 1 capsule into the lungs  8/31/17   Historical Provider, MD   sertraline (ZOLOFT) 100 MG tablet Take 100 mg by mouth daily    Historical Provider, MD   atorvastatin (LIPITOR) 40 MG tablet Take 1 tablet by mouth nightly 2/21/18   Emma Dennis MD   albuterol sulfate  (90 Base) MCG/ACT inhaler Inhale 2 puffs into the lungs every 6 hours as needed for Wheezing    Historical Provider, MD   fluticasone (FLONASE) 50 MCG/ACT nasal spray 1 spray by Nasal route daily Historical Provider, MD   albuterol (PROVENTIL) (2.5 MG/3ML) 0.083% nebulizer solution Take 3 mLs by nebulization every 6 hours as needed for Wheezing. 9/24/14   Yunior Waterman MD   budesonide-formoterol (SYMBICORT) 160-4.5 MCG/ACT AERO Inhale 2 puffs into the lungs 2 times daily. 9/24/14   Yifan Navarro MD     CURRENT MEDS :  Scheduled Meds:   lidocaine  1 patch TransDERmal Daily    insulin glargine  20 Units SubCUTAneous Nightly    insulin lispro  0-18 Units SubCUTAneous TID WC    insulin lispro  0-9 Units SubCUTAneous Nightly    sertraline  100 mg Oral Daily    albuterol  2.5 mg Nebulization 4x daily    levofloxacin  750 mg IntraVENous Q24H    predniSONE  20 mg Oral Daily    amLODIPine  5 mg Oral Daily    atorvastatin  40 mg Oral Nightly    budesonide-formoterol  2 puff Inhalation BID    furosemide  80 mg IntraVENous BID    ferrous sulfate  325 mg Oral BID WC    folic acid  1 mg Oral Daily    hydrALAZINE  25 mg Oral 3 times per day    isosorbide dinitrate  20 mg Oral TID    pantoprazole  40 mg Oral QAM AC    spironolactone  25 mg Oral Daily    tiotropium  2 puff Inhalation Daily    vitamin B-12  1,000 mcg Oral Daily    sodium chloride flush  10 mL IntraVENous 2 times per day    heparin (porcine)  5,000 Units SubCUTAneous 3 times per day    gabapentin  300 mg Oral TID       Continuous Infusions:   sodium chloride Stopped (04/09/22 0636)    dextrose             Review of Systems   Constitutional: Positive for fatigue. Negative for fever. HENT: Negative. Respiratory: Positive for cough and shortness of breath. Negative for wheezing. Cardiovascular: Negative. Gastrointestinal: Negative. Musculoskeletal: Positive for arthralgias. Neurological: Negative.            Vitals:  /77   Pulse 66   Temp 98.2 °F (36.8 °C) (Axillary)   Resp 15   Ht 5' 6\" (1.676 m)   Wt (!) 342 lb 9.5 oz (155.4 kg)   SpO2 95%   BMI 55.30 kg/m²     PHYSICAL EXAM:  Neck: Short thick neck, low hanging soft palate, large tongue, large uvula. No adenopathy, no goiter,     Head and neck atraumatic, normocephalic    Lymph nodes-no cervical, supraclavicular lymphadenopathy    Neck-no JVP elevation    Lungs - dec vent bases  No wheeze   No rales     CVS- S1, S2 regular. No S3 no S4, no murmurs    Abdomen-nontender, nondistended. Bowel sounds are present. No organomegaly    Lower extremity-+ edema    Upper extremity-no edema    Neurological-grossly normal cranial nerves. No overt motor deficit         CBC:   Recent Labs     04/12/22  0507 04/13/22  0552 04/14/22  0536   WBC 9.6 11.1 10.9   HGB 8.7* 8.5* 9.4*    243 247     BMP:    Recent Labs     04/12/22  0507 04/13/22 0552 04/14/22 0536    137 136   K 4.6 3.4* 4.0   CL 93* 87* 87*   CO2 33* 36* 40*   BUN 41* 34* 34*   CREATININE 1.28* 1.21* 1.11*   GLUCOSE 300* 134* 216*     Hepatic:   No results for input(s): AST, ALT, ALB, BILITOT, ALKPHOS in the last 72 hours. XR CHEST PORTABLE    Result Date: 4/10/2022  *Stable cardiomegaly with mild central vascular congestion. *Interval increased right perihilar/infrahilar opacity which may represent developing pneumonia. XR CHEST PORTABLE    Result Date: 4/8/2022  Limited negative portable chest radiograph. CONCLUSIONS     Summary  Left ventricle is normal in size. Global left ventricular systolic function is normal. Calculated ejection  fraction 60% by Heart Model. Marked Leftward compression of inter-ventricular septum (\"D-sign\")  indicating RV volume or pressure overload. Right ventricular function appears reduced. Moderately dilated right  ventricular cavity. Moderate to severe tricuspid regurgitation. Severe pulmonary hypertension. Estimated right ventricular systolic pressure  is [id number].   Trivial pulmonic insufficiency.     Signature  ----------------------------------------------------------------------------   Electronically signed by Juliet Sears(Sonographer) on 03/22/2022   04:14 PM  ----------------------------------------------------------------------------     ----------------------------------------------------------------------------   Electronically signed by Don Ellington(Interpreting physician) on 03/23/2022   11:48 AM    IMPRESSION:    Patient Active Problem List   Diagnosis Code    Asthma J45.909    Pneumonia J18.9    HTN (hypertension) I10    Torn meniscus S83.209A    Chest pain R07.9    Pulmonary hypertension, moderate to severe ([facility]) I27.20    Morbid obesity with BMI of 50.0-59.9, adult ([facility] E66.01, Z68.43    Obesity hypoventilation syndrome ([facility]) E66.2    H/O tracheostomy Z98.890    STEPH (obstructive sleep apnea) G47.33    Right heart failure, unspecified ([facility]) I50.810    Acute on chronic diastolic heart failure ([facility]) I50.33    Acute on chronic congestive heart failure ([facility]) I50.9    Diabetes mellitus, new onset ([facility] E11.9    Dizziness R42    Normocytic anemia D64.9    Pneumonia of both lower lobes due to infectious organism J18.9    NSTEMI (non-ST elevated myocardial infarction) ([facility]) I21.4    Acute pulmonary edema ([facility]) J81.0    Hypertensive emergency I16.1    Acute respiratory failure with hypoxia and hypercapnia ([facility]) J96.01, J96.02    Smoker F17.200    Morbid obesity ([facility]) E66.01    SOB (shortness of breath) R06.02    COPD exacerbation ([facility]) J44.1    ARDS (adult respiratory distress syndrome) ([facility]) J80    Fever R50.9    Disease due to rhinovirus B34.8    Encounter for PEG (percutaneous endoscopic gastrostomy) ([address] Z43.1    Status post tracheostomy ([facility] Z93.0    Status post insertion of percutaneous endoscopic gastrostomy (PEG) tube ([address] Z93.1    Rhinovirus infection B34.8    Acute non-recurrent pansinusitis J01.40    Chronic respiratory failure (HCC) J96.10    Pulmonary hypertension (HCC) I27.20    Chronic narcotic dependence (HCC) F11.20    Chronically on benzodiazepine therapy Z79.899    Neuropathy G62.9    Epigastric pain R10.13    Muscle spasm M62.838    Dyspnea R06.00    Prediabetes R73.03    Hyperlipidemia E78.5    Hypokalemia E87.6    Hypomagnesemia E83.42    CHF (congestive heart failure), NYHA class I, acute on chronic, combined (HCC) I50.43        :     acute on chronic hypoxic hypercapnic respiratory failure         Chronic respiratory acidosis  Severe pulmonary hypertension group 2 and group 3  STEPH OHS  Chronic obstructive pulmonary disease chronic home oxygen  Morbid obesity  Acute on chronic congestive heart failure  Principal Problem:    CHF (congestive heart failure), NYHA class I, acute on chronic, combined (HCC)  Active Problems:    Asthma    HTN (hypertension)    Acute respiratory failure with hypoxia and hypercapnia (HCC)    Morbid obesity with BMI of 50.0-59.9, adult ([facility])    Obesity hypoventilation syndrome (HCC)    STEPH (obstructive sleep apnea)    Pulmonary hypertension ([facility])    Normocytic anemia    Prediabetes    Hyperlipidemia  Resolved Problems:    * No resolved hospital problems. *       PLAN:      Continue to wean high flow - keep sats 88-92 %   bipap at night and prn during day   Continue bronchodilator therapy  Discharge planning to LTAC  Dec lasix 40 mg iv bid           I hope this updates you on my evaluation and clinical thinking. Thank you for allowing me to participate in his care.      Sincerely,      Electronically signed by Ramsey Landeros MD on 4/14/2022 at 9:27 AM

## 2022-04-14 NOTE — PLAN OF CARE
Called MARTINE Duncan and asked if the patient could sit up on the chair. But patient has refused to do so per RN. She has refused to sit up on the chair when physical therapy has asked her as well.

## 2022-04-14 NOTE — PROGRESS NOTES
[facility]  Internal Medicine Teaching Residency Program  Inpatient Daily Progress Note  ______________________________________________________________________________    Patient: Fanta Mueller  YOB: 1975   OH:[id number]    Acct: [de-identified]     Room: 2004/2004-01  Admit date: 4/8/2022  Today's date: 04/14/22  Number of days in the hospital: 6    SUBJECTIVE   Admitting Diagnosis: CHF (congestive heart failure), NYHA class I, acute on chronic, combined ([facility] [address]  CC: SOB  Pt examined at bedside. Chart & results reviewed. Patient is afebrile, normotensive and saturating well on BiPAP. Patient states she slept well last night on the BiPAP. Still complains of pain. Palliative care consulted. Awaiting placement to LTAC. Patient wants to take time reviewing her options for discharge with the care navigator. ROS:  Constitutional:  negative for chills, fevers, sweats  Respiratory:  negative for cough, dyspnea on exertion, hemoptysis,  wheezing  Cardiovascular: lower extremity edema, palpitations  Gastrointestinal:  negative for abdominal pain, constipation, diarrhea, nausea, vomiting  Neurological:  negative for dizziness, headache    BRIEF HISTORY     The patient is a pleasant 55 y.o. 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 Patient states she has had increased shortness of breath the past 2 nights, unable to wear her CPAP nightly with increased fatigue. Patient is poor historian, states she wears 7 L O2 at home, however EMS on arrival saw patient on 4 L and was saturating in the mid 80s. Patient was placed on nonrebreather by EMS and saturations improved to 98%.     Of note, patient had previous hospitalization earlier this month for COPD exacerbation complicated by pneumonia. At that time patient was treated with antibiotics and steroids.   Pulmonology was consulted and patient was approved for trilogy noninvasive ventilator which she has yet to receive. Echocardiogram at that time showed EF 60%, \"D\" sign indicating RV pressure overload, moderate to severe MR, severe pulmonary hypertension. Patient is aware of these findings.     On my evaluation, patient resting comfortably on 15 L nonrebreather mask acute distress. Patient is morbidly obese with BMI 57. Denies any cough, chest pain, leg pain/swelling. She does have skin irritation on her buttocks. States she has been compliant with all meds (inculding bronchodilators and bumex 2mg BID) except for her PO DM meds as those were held on previous admission. Afebrile, hemodynamic stable, tachycardic in the 110s at present. Patient received prednisone and lasix 40mg IV in ED as well as rocephin and vancomycin. CXR in ED - limited negative CXR d/t body habitus.     Significant labs include proBNP on admission 4192, was in 1000s on recent discharge. Leukocytosis of 17.0. Hemoglobin 9.7. Slight bump in creatinine from baseline- 1.21. glucose 238.       4/10/2022-episode of hypoxia, patient drank 3.5 L of fluid admitted with CHF exacerbation, getting a chest x-ray, IV diuretics to continue, labs de-escalate, prednisone discontinued. 2022 - Patient has drank 2.5 L in the last 24 hours. Admitted with CHF exacerbation, currently on IV Lasix 80 mg IV twice daily. Did receive this morning. We will get a chest x-ray to look for any worsening pulmonary edema. Mild edema bilateral lower extremities noted. Labs Descalated, prednisone discontinued    2022 - Overnight BG went up to 423 and then insulin Lantus was increased to 20 nightly.       OBJECTIVE     Vital Signs:  /77   Pulse 66   Temp 98.2 °F (36.8 °C) (Axillary)   Resp 12   Ht 5' 6\" (1.676 m)   Wt (!) 342 lb 9.5 oz (155.4 kg)   SpO2 93%   BMI 55.30 kg/m²     Temp (24hrs), Av.3 °F (36.8 °C), Min:98.1 °F (36.7 °C), Max:98.4 °F (36.9 °C)    In: 800 Out: 3400 [Urine:3400]    Physical Exam:  Constitutional: This is a well developed, well nourished, Greater than 40 - Morbid Obesity / Extreme Obesity / Grade III 55y.o. year old female who is alert, oriented, cooperative and in no apparent distress. Head:normocephalic and atraumatic. EENT:  PERRLA. No conjunctival injections. Septum was midline, mucosa was without erythema, exudates or cobblestoning. No thrush was noted. Neck: Supple without thyromegaly. No elevated JVP. Trachea was midline. Respiratory: Diminished breath sounds bilaterally limited due to body habitus. Cardiovascular: Regular without murmur, clicks, gallops or rubs. Abdomen: Slightly rounded and soft without organomegaly. No rebound, rigidity or guarding was appreciated. Lymphatic: No lymphadenopathy. Musculoskeletal: Normal curvature of the spine. No gross muscle weakness. Extremities: Mild bilateral lower extremity edema  Skin:  Warm and dry. Good color, turgor and pigmentation. No lesions or scars.   No cyanosis or clubbing  Neurological/Psychiatric: The patient's general behavior, level of consciousness, thought content and emotional status is normal.        Medications:  Scheduled Medications:    lidocaine  1 patch TransDERmal Daily    insulin glargine  20 Units SubCUTAneous Nightly    insulin lispro  0-18 Units SubCUTAneous TID WC    insulin lispro  0-9 Units SubCUTAneous Nightly    sertraline  100 mg Oral Daily    albuterol  2.5 mg Nebulization 4x daily    levofloxacin  750 mg IntraVENous Q24H    predniSONE  20 mg Oral Daily    amLODIPine  5 mg Oral Daily    atorvastatin  40 mg Oral Nightly    budesonide-formoterol  2 puff Inhalation BID    furosemide  80 mg IntraVENous BID    ferrous sulfate  325 mg Oral BID WC    folic acid  1 mg Oral Daily    hydrALAZINE  25 mg Oral 3 times per day    isosorbide dinitrate  20 mg Oral TID    pantoprazole  40 mg Oral QAM AC    spironolactone  25 mg Oral Daily    tiotropium  2 puff Inhalation Daily    vitamin B-12  1,000 mcg Oral Daily    sodium chloride flush  10 mL IntraVENous 2 times per day    heparin (porcine)  5,000 Units SubCUTAneous 3 times per day    gabapentin  300 mg Oral TID     Continuous Infusions:    sodium chloride Stopped (04/09/22 0636)    dextrose       PRN MedicationsoxyCODONE-acetaminophen, 1 tablet, Q6H PRN  oxyCODONE, 5 mg, Q4H PRN  acetaminophen, 650 mg, Q4H PRN  sodium chloride, 1 spray, PRN  albuterol sulfate HFA, 2 puff, Q6H PRN  diazePAM, 5 mg, Q12H PRN  sodium chloride flush, 10 mL, PRN  sodium chloride, , PRN  magnesium sulfate, 1,000 mg, PRN  ondansetron, 4 mg, Q8H PRN   Or  ondansetron, 4 mg, Q6H PRN  polyethylene glycol, 17 g, Daily PRN  glucose, 15 g, PRN  dextrose, 12.5 g, PRN  glucagon (rDNA), 1 mg, PRN  dextrose, 100 mL/hr, PRN  albuterol, 2.5 mg, As Directed RT PRN        Diagnostic Labs:  CBC:   Recent Labs     04/12/22  0507 04/13/22  0552 04/14/22  0536   WBC 9.6 11.1 10.9   RBC 3.32* 3.31* 3.61*   HGB 8.7* 8.5* 9.4*   HCT 29.5* 28.8* 32.0*   MCV 88.9 87.0 88.6   RDW 15.5* 15.9* 16.2*    243 247     BMP:   Recent Labs     04/12/22  0507 04/13/22  0552 04/14/22  0536    137 136   K 4.6 3.4* 4.0   CL 93* 87* 87*   CO2 33* 36* 40*   BUN 41* 34* 34*   CREATININE 1.28* 1.21* 1.11*     BNP: No results for input(s): BNP in the last 72 hours. PT/INR: No results for input(s): PROTIME, INR in the last 72 hours. APTT: No results for input(s): APTT in the last 72 hours. CARDIAC ENZYMES: No results for input(s): CKMB, CKMBINDEX, TROPONINI in the last 72 hours. Invalid input(s): CKTOTAL;3  FASTING LIPID PANEL:  Lab Results   Component Value Date    CHOL 144 11/17/2018    HDL 42 10/07/2020    TRIG 68 11/17/2018     LIVER PROFILE:   No results for input(s): AST, ALT, ALB, BILIDIR, BILITOT, ALKPHOS in the last 72 hours.    MICROBIOLOGY:   Lab Results   Component Value Date/Time    CULTURE NO GROWTH 5 DAYS 04/09/2022 02:22 AM Imaging:    XR CHEST PORTABLE    Result Date: 4/8/2022  Limited negative portable chest radiograph. ECHO Complete 2D W Doppler W Color    Result Date: 3/22/2022  Transthoracic Echocardiography Report (TTE)  Patient Name PRICE       Date of Study               03/22/2022               Rakel Primer   Date of      1975  Gender                      Female  Birth   Age          55 year(s)  Race                        Black   Room Number  2008        Height:                     65.98 inch, 167.6 cm   Corporate ID [id number]    Weight:                     380 pounds, 172.4 kg  #   Patient Acct [de-identified]   BSA:          2.63 m^2      BMI:      61.36  #                                                              kg/m^2   MR #         [id number]     Esteban Hauser   Accession #  [id number]  Interpreting Physician      [address] [place] [address]   Fellow                   Referring Nurse                           Practitioner   Interpreting             Referring Physician         [facility]  Fellow  Additional Comments Technically difficult study. Type of Study   TTE procedure:2D Echocardiogram, M-Mode, Doppler, Color Doppler. Procedure Date Date: 03/22/2022 Start: 03:37 PM Study Location: [facility] Technical Quality: Adequate visualization Indications:Dyspnea/SOB. History / Tech. Comments: Echo done at patient bedside. Procedure explained to patient. HTN, COPD, PHTN, STEPH, HX NSTEMI Patient Status: Inpatient Height: 65.98 inches Weight: 380 pounds BSA: 2.63 m^2 BMI: 61.36 kg/m^2 Allergies   - Aspirin.   - Sulfa. CONCLUSIONS Summary Left ventricle is normal in size. Global left ventricular systolic function is normal. Calculated ejection fraction 60% by Heart Model. Marked Leftward compression of inter-ventricular septum (\"D-sign\") indicating RV volume or pressure overload. Right ventricular function appears reduced. Moderately dilated right ventricular cavity.  Moderate to severe tricuspid regurgitation. Severe pulmonary hypertension. Estimated right ventricular systolic pressure is [id number]. Trivial pulmonic insufficiency. Signature ----------------------------------------------------------------------------  Electronically signed by Juliet Yancey(Sonographer) on 03/22/2022  04:14 PM ---------------------------------------------------------------------------- ----------------------------------------------------------------------------  Electronically signed by Don Ellington(Interpreting physician) on 03/23/2022  11:48 AM ---------------------------------------------------------------------------- FINDINGS Left Atrium Left atrium is normal in size. Left Ventricle Left ventricle is normal in size. Global left ventricular systolic function is normal. Calculated ejection fraction 60% by Heart Model. Marked Leftward compression of inter-ventricular septum (\"D-sign\") indicating RV volume or pressure overload. Right Atrium Right atrium is normal in size. Right Ventricle Right ventricular function appears reduced. Moderately dilated right ventricular cavity. Mitral Valve Normal mitral valve structure and function. No mitral regurgitation. Aortic Valve Aortic valve is trileaflet and opens adequately. No aortic insufficiency. Tricuspid Valve No obvious valvular abnormality. Moderate to severe tricuspid regurgitation. Severe pulmonary hypertension. Estimated right ventricular systolic pressure is 64BRXV. Pulmonic Valve The pulmonic valve is normal in structure. Trivial pulmonic insufficiency. Pericardial Effusion No pericardial effusion seen. Miscellaneous E/E' average = 7.1. IVC normal diameter & inspiratory collapse indicating normal RA filling pressure .  M-mode / 2D Measurements & Calculations:   LVIDd:3.4 cm(3.7 - 5.6 cm)       Diastolic IRGSGW:65.8 ml  OTHBF:1.4 cm(2.2 - 4.0 cm)       Systolic SSETLH:48.9 ml  XNRX:2.7 cm(0.6 - 1.1 cm)        Aortic Root:2.8 cm(2.0 - 3.7 cm)  LVPWd:1 cm(0.6 - 1.1 cm)         LA Dimension: 2.4 cm(1.9 - 4.0 cm)  Fractional Shortenin.24 %    LA volume/Index: 47.93 ml /18m^2  Calculated LVEF (%): 60.84 %     LVOT:1.7 cm                                   RVDd:4.91 cm   Mitral:                                 Aortic   Valve Area (P1/2-Time): 3.86 cm^2       Peak Velocity: 1.66 m/s  Peak E-Wave: 0.75 m/s                   Mean Velocity: 1.04 m/s  Peak A-Wave: 0.60 m/s                   Peak Gradient: 11.02 mmHg  E/A Ratio: 1.25                         Mean Gradient: 5 mmHg  Peak Gradient: 2.25 mmHg  Mean Gradient: 2 mmHg  Deceleration Time: 195 msec             Area (continuity): 1.37 cm^2  P1/2t: 57 msec                          AV VTI: 29.9 cm   Area (continuity): 1.96 cm^2  Mean Velocity: 0.75 m/s   Tricuspid:                              Pulmonic:   Peak TR Velocity: 4.34 m/s              Peak Velocity: 1.22 m/s  Peak TR Gradient: 75.3424 mmHg          Peak Gradient: 5.95 mmHg  Diastology / Tissue Doppler Septal Wall E' velocity:0.10 m/s Septal Wall E/E':7.7 Lateral Wall E' velocity:0.11 m/s Lateral Wall E/E':6.6    XR ABDOMEN (KUB) (SINGLE AP VIEW)    Result Date: 2022  EXAMINATION: ONE SUPINE XRAY VIEW(S) OF THE ABDOMEN 2022 12:45 pm COMPARISON: CT dated 2019. HISTORY: ORDERING SYSTEM PROVIDED HISTORY: Constipation, right lower abdominal  pain TECHNOLOGIST PROVIDED HISTORY: Constipation, right lower abdominal  pain Reason for Exam: supine FINDINGS: Nonspecific bowel gas pattern is seen with gaseous distension of the stomach. Mild-to-moderate amount of stool is noted in the colon. Evaluation of free air is limited on this supine view. There appears to be Trujillo catheter in place. Nonspecific bowel gas pattern with mild-to-moderate amount of stool noted in the colon. Gaseous distention of the stomach.      XR CHEST PORTABLE    Result Date: 4/10/2022  EXAMINATION: ONE XRAY VIEW OF THE CHEST 4/10/2022 9:12 am COMPARISON: 2022 HISTORY: Reason for Exam: Acute hypoxia admitted with Pul edema FINDINGS: Stable cardiomegaly. Mild central vascular congestion. Interval increased right perihilar/infrahilar opacity. No sizable effusion or discernible pneumothorax. Osseous structures grossly intact. *Stable cardiomegaly with mild central vascular congestion. *Interval increased right perihilar/infrahilar opacity which may represent developing pneumonia. XR CHEST PORTABLE    Result Date: 4/8/2022  EXAMINATION: ONE XRAY VIEW OF THE CHEST 4/8/2022 4:37 pm COMPARISON: 03/22/2022 HISTORY: ORDERING SYSTEM PROVIDED HISTORY: Shortness of breath, COPD and CHF, recent admission for pneumonia TECHNOLOGIST PROVIDED HISTORY: Shortness of breath, COPD and CHF, recent admission for pneumonia Reason for Exam: upr,sob, FINDINGS: Examination is limited by the patient's body habitus and by portable technique. Cardial pericardial silhouette is prominent but stable. There are low lung volumes is secondary bronchovascular crowding. No focal infiltrate is identified. No pneumothorax. No free air. No acute bony abnormality. Limited negative portable chest radiograph. XR CHEST PORTABLE    Result Date: 3/22/2022  EXAMINATION: ONE XRAY VIEW OF THE CHEST 3/22/2022 9:30 am COMPARISON: 03/19/2022 HISTORY: ORDERING SYSTEM PROVIDED HISTORY: improvement in pnuemonia TECHNOLOGIST PROVIDED HISTORY: improvement in pnuemonia Reason for Exam: ap uprt port chest FINDINGS: As compared to prior examination, there has been significant improvement in the right lower lobe infiltrate. Lungs appear clear. There is cardiomegaly noted. Bony structures unremarkable. Improvement in the the right basal infiltrate. XR CHEST PORTABLE    Result Date: 3/19/2022  EXAMINATION: ONE XRAY VIEW OF THE CHEST 3/19/2022 12:51 am COMPARISON: CT PA performed approximately 10 hours earlier. Portable chest obtained approximately 12 hours earlier.  HISTORY: ORDERING SYSTEM PROVIDED HISTORY: Increasing O2 requirment TECHNOLOGIST PROVIDED HISTORY: Increasing O2 requirment Reason for Exam: shortness of breath, chest pain off and on   upright port FINDINGS: Mild cardiomegaly and normal pulmonary vasculature. Right worse than left bibasilar patchy airspace opacities which appear worse than exam 12 hours earlier. No pneumothorax or pleural effusion. Surrounding structures are unremarkable. Findings suggestive of worsening bibasilar pneumonia. XR CHEST PORTABLE    Result Date: 3/18/2022  EXAMINATION: ONE XRAY VIEW OF THE CHEST 3/18/2022 11:16 am COMPARISON: Chest x-ray dated 29 June 2021 HISTORY: ORDERING SYSTEM PROVIDED HISTORY: sob TECHNOLOGIST PROVIDED HISTORY: sob FINDINGS: Mild stable cardiomegaly with pulmonary vascular congestion. No pneumothorax or pleural effusion. Stable cardiomegaly with mild pulmonary vascular congestion. CT CHEST PULMONARY EMBOLISM W CONTRAST    Result Date: 3/18/2022  EXAMINATION: CTA OF THE CHEST 3/18/2022 1:23 pm TECHNIQUE: CTA of the chest was performed after the administration of intravenous contrast.  Multiplanar reformatted images are provided for review. MIP images are provided for review. Dose modulation, iterative reconstruction, and/or weight based adjustment of the mA/kV was utilized to reduce the radiation dose to as low as reasonably achievable. COMPARISON: None HISTORY: ORDERING SYSTEM PROVIDED HISTORY: sedentary lifestyle, leg swelling, increased O2 TECHNOLOGIST PROVIDED HISTORY: sedentary lifestyle, leg swelling, increased O2 Decision Support Exception - unselect if not a suspected or confirmed emergency medical condition->Emergency Medical Condition (MA) Reason for Exam: sedentary lifestyle, leg swelling, increased O2 FINDINGS: Pulmonary Arteries: Pulmonary arteries are adequately opacified for evaluation. No evidence of intraluminal filling defect to suggest pulmonary embolism. Main pulmonary artery is normal in caliber.  Mediastinum: No evidence of mediastinal lymphadenopathy. Small reactive lymph nodes seen in the mediastinum and hilar regions. The heart and pericardium demonstrate no acute abnormality. There is no acute abnormality of the thoracic aorta. Lungs/pleura: Patchy ground-glass opacity and increased markings seen in the lower lung fields bilaterally concerning for bibasilar infiltrate. No pleural effusion or pneumothorax. No pulmonary mass or nodule. Patchy ground-glass opacity in the upper lung fields bilaterally. Upper Abdomen: Limited images of the upper abdomen are unremarkable. Soft Tissues/Bones: No acute bone or soft tissue abnormality. Degenerative changes seen within the spine with no chest wall abnormality. No evidence of pulmonary embolism. There is patchy ill-defined opacification lower lung fields bilaterally suggesting infiltrate with ground-glass opacity concerning for pneumonia as well in the upper lung fields. Reactive adenopathy throughout the mediastinum and hilar regions. RECOMMENDATIONS: Unavailable     VL DUP LOWER EXTREMITY VENOUS BILATERAL    Result Date: 3/19/2022    [facility]  Vascular Lower Extremities DVT Study Procedure   Patient Name  CARMEN       Date of Study           03/18/2022                [address] [facility]   Date of Birth 1975  Gender                  Female   Age           55 year(s)  Race                    Black   Room Number   2008        Height:                 65.98 inch, 167.6 cm   Corporate ID  [id number]    Weight:                 380 pounds, 172.4 kg  #   Patient Acct  [de-identified]   BSA:        2.63 m^2    BMI:       61.36 kg/m^2  #   MR #          [id number]     Sonographer             Yancy Nur   Accession #   [id number]  Interpreting Physician  Rachele Mejias   Referring                 Referring Physician     Ermelinda Restrepo.  Shanika Trujillo DO  Nurse  Practitioner  Procedure Type of Study:   Veins: Lower Extremities DVT Study, Venous Scan Lower Bilateral.  Indications for Study:Leg Swelling and Shortness of breath. Patient Status:ER. Technical Quality:Limited visualization. Limitation reason:bedside exam , body habitus, deep vessels, edema. Conclusions   Summary   No evidence of superficial or deep venous thrombosis in both lower  extremities. Signature   ----------------------------------------------------------------  Electronically signed by NIKKI Grant(Sonographer) on  03/18/2022 01:27 PM  ----------------------------------------------------------------   ----------------------------------------------------------------  Electronically signed by Michaele Gimenez Reyes,Arthur(Interpreting  physician) on 03/19/2022 07:28 AM  ----------------------------------------------------------------  Findings:   Right Impression:                    Left Impression:  The common femoral, femoral,         The common femoral, femoral,  popliteal and tibial veins           popliteal and tibial veins  demonstrate normal compressibility   demonstrate normal compressibility  and augmentation. and augmentation. Normal compressibility of the great  Normal compressibility of the great  saphenous vein. saphenous vein. Normal compressibility of the small  Normal compressibility of the small  saphenous vein. saphenous vein. Limited visualization of the         Limited visualization of the  posterior tibial and peroneal veins. posterior tibial and peroneal veins. Risk Factors History +-------------------------------------------+----------+-------------------+ ! Diagnosis                                  ! Date      ! Comments           ! +-------------------------------------------+----------+-------------------+ ! Previous Scan                              !04/04/2008! WNL                ! +-------------------------------------------+----------+-------------------+ ! Chronic lung disease->COPD                 !          !                   ! +-------------------------------------------+----------+-------------------+ ! Previous Scan                              !09/22/2014! Bilateral WNL      ! +-------------------------------------------+----------+-------------------+ ! CHF                                        ! !                   ! +-------------------------------------------+----------+-------------------+   - The patient's risk factor(s) include: chronic lung disease, dyslipidemia     and arterial hypertension.   - The patient has a former tobacco history. Allergies   - Allergy:Aspirin(Drug). - Allergy:Sulfa(Drug). Velocities are measured in cm/s ; Diameters are measured in cm Right Lower Extremities DVT Study Measurements Right 2D Measurements +------------------------------------+----------+---------------+----------+ ! Location                            ! Visualized! Compressibility! Thrombosis! +------------------------------------+----------+---------------+----------+ ! Common Femoral                      !Yes       ! Yes            ! None      ! +------------------------------------+----------+---------------+----------+ ! Prox Femoral                        !Yes       ! Yes            ! None      ! +------------------------------------+----------+---------------+----------+ ! Mid Femoral                         !Partial   !Yes            ! None      ! +------------------------------------+----------+---------------+----------+ ! Dist Femoral                        !Partial   !Yes            ! None      ! +------------------------------------+----------+---------------+----------+ ! Popliteal                           !Yes       ! Yes            ! None      ! +------------------------------------+----------+---------------+----------+ ! Sapheno Femoral Junction            ! Yes       ! Yes            ! None      ! +------------------------------------+----------+---------------+----------+ ! PTV                                 ! Partial   !Yes !None      ! +------------------------------------+----------+---------------+----------+ ! Peroneal                            !Partial   !Yes            ! None      ! +------------------------------------+----------+---------------+----------+ ! Gastroc                             ! Yes       ! Yes            ! None      ! +------------------------------------+----------+---------------+----------+ ! GSV Thigh                           ! Yes       ! Yes            ! None      ! +------------------------------------+----------+---------------+----------+ ! GSV Knee                            ! Yes       ! Yes            ! None      ! +------------------------------------+----------+---------------+----------+ ! GSV Ankle                           ! Yes       ! Yes            ! None      ! +------------------------------------+----------+---------------+----------+ ! SSV                                 ! Yes       ! Yes            ! None      ! +------------------------------------+----------+---------------+----------+ Right Doppler Measurements +--------------------------+---------+------+------------------------------+ ! Location                  ! Signal   !Reflux! Reflux (msec)                 ! +--------------------------+---------+------+------------------------------+ ! Common Femoral            !Pulsatile!      !                              ! +--------------------------+---------+------+------------------------------+ ! Prox Femoral              !Pulsatile!      !                              ! +--------------------------+---------+------+------------------------------+ ! Popliteal                 !Pulsatile!      !                              ! +--------------------------+---------+------+------------------------------+ Left Lower Extremities DVT Study Measurements Left 2D Measurements +------------------------------------+----------+---------------+----------+ ! Location !Visualized! Compressibility! Thrombosis! +------------------------------------+----------+---------------+----------+ ! Common Femoral                      !Yes       ! Yes            ! None      ! +------------------------------------+----------+---------------+----------+ ! Prox Femoral                        !Yes       ! Yes            ! None      ! +------------------------------------+----------+---------------+----------+ ! Mid Femoral                         !Partial   !Yes            ! None      ! +------------------------------------+----------+---------------+----------+ ! Dist Femoral                        !Partial   !Yes            ! None      ! +------------------------------------+----------+---------------+----------+ ! Popliteal                           !Yes       ! Yes            ! None      ! +------------------------------------+----------+---------------+----------+ ! Sapheno Femoral Junction            ! Yes       ! Yes            ! None      ! +------------------------------------+----------+---------------+----------+ ! PTV                                 ! Partial   !Yes            ! None      ! +------------------------------------+----------+---------------+----------+ ! Peroneal                            !Partial   !Yes            ! None      ! +------------------------------------+----------+---------------+----------+ ! Gastroc                             ! Yes       ! Yes            ! None      ! +------------------------------------+----------+---------------+----------+ ! GSV Thigh                           ! Yes       ! Yes            ! None      ! +------------------------------------+----------+---------------+----------+ ! GSV Knee                            ! Yes       ! Yes            ! None      ! +------------------------------------+----------+---------------+----------+ ! GSV Ankle                           ! Yes       ! Yes            ! None      ! +------------------------------------+----------+---------------+----------+ ! SSV                                 ! Yes       ! Yes            ! None      ! +------------------------------------+----------+---------------+----------+ Left Doppler Measurements +--------------------------+---------+------+------------------------------+ ! Location                  ! Signal   !Reflux! Reflux (msec)                 ! +--------------------------+---------+------+------------------------------+ ! Common Femoral            !Pulsatile!      !                              ! +--------------------------+---------+------+------------------------------+ ! Prox Femoral              !Pulsatile!      !                              ! +--------------------------+---------+------+------------------------------+ ! Popliteal                 !Pulsatile!      !                              ! +--------------------------+---------+------+------------------------------+    FL MODIFIED BARIUM SWALLOW W VIDEO    Result Date: 3/19/2022  EXAMINATION: MODIFIED BARIUM SWALLOW WAS PERFORMED IN CONJUNCTION WITH SPEECH PATHOLOGY SERVICES TECHNIQUE: Fluoroscopic evaluation of the swallowing mechanism was performed using cineradiography with multiple consistency of barium product in conjunction with speech pathology services. FLUOROSCOPY DOSE AND TYPE OR TIME AND EXPOSURES: Fluoro time: 1.1 minute DAP: 22.570 mGy COMPARISON: None HISTORY: ORDERING SYSTEM PROVIDED HISTORY: h/o esophageal stricture per patient TECHNOLOGIST PROVIDED HISTORY: h/o esophageal stricture per patient 59-year-old female with history of esophageal stricture FINDINGS: No penetration or aspiration with the thin liquid and thick liquid substance by straw, pureed/pudding thick, soft solid and cookie solid substances. No penetration or aspiration with the above administered substances. Please see separate speech pathology report for full discussion of findings and recommendations. ASSESSMENT & PLAN     ASSESSMENT / PLAN:     Principal Problem:    CHF (congestive heart failure), NYHA class I, acute on chronic, combined ([facility] [address]  Active Problems:    Asthma    HTN (hypertension)    Acute respiratory failure with hypoxia and hypercapnia (HCC)    Morbid obesity with BMI of 50.0-59.9, adult (HCC)    Obesity hypoventilation syndrome (HCC)    STEPH (obstructive sleep apnea)    Pulmonary hypertension (HCC)    Normocytic anemia    Prediabetes    Hyperlipidemia  Resolved Problems:    * No resolved hospital problems. *      Acute hypoxic respiratory failure likely secondary to pulmonary edema with underlying  chronic congestive heart failure  - Echo from 3/22/2022 shows EF 60 %  moderately dilated right ventricle. Moderate to severe tricuspid regurgitation. Severe pulmonary hypertension with estimated right ventricular systolic pressure 80 mmHg      -Continue with IV diuresis furosemide 80 mg IV twice daily to be continued on discharge per Dr. Ansel Saint. CXR 4/10/22   *Interval increased right perihilar/infrahilar opacity which may represent   developing pneumonia.     -Continue with BiPAP overnight  -Strict I&O's, fluid restriction to 1.5 L      History of asthma - Pulmonology on board. Recommend to continue on bronchodilators-albuterol, Spiriva and LABA plus ICS    STEPH/OHS- bipap nightly    Essential HTN  Continue home med Norvasc 5 mg oral daily. .    Anemia of chronic disease Ferritin elevated at 327. iron 36, TIBC 239, iron saturation 15. Hemoglobin 8.7 and stable. Monitor H&H to monitor for any signs of bleeding. pre-DM  a1c 6.4  Med dose sliding scale  Insulin Lantus 10-->20 U SC nightly  Patient was not taking PO meds  POCT glucose 4xdaily    DVT ppx: heparin  GI ppx: protonix   Diet: Regular    PT/OT/SW : On board. Discharge Planning:  Needs LTACH  placement. Discharge planning in progress.   Patient states she wants to stay here and take time reviewing her options for discharge to Carmelita Oscar MD  Internal Medicine Resident, PGY-1  [facility]; [place], [place]  4/14/2022, 8:30 AM      Attending Physician Statement  I have discussed the care of Greg Nicole and I have examined the patient myselft and taken ros and hpi , including pertinent history and exam findings,  with the resident. I have reviewed the key elements of all parts of the encounter with the resident. I agree with the assessment, plan and orders as documented by the resident.   AC on ch hypoxic resp failure , on high flow 55%, 30L oxygen  STEPH/OHS  COPD   Severe Pulm HTN   MO   PNA,   Anemia of CD   Ac CHF   Inhalers Symbicort/spiriva, alb prn   Trilogy advised, insurance to approve   Palliative care consult appreciated   Overall prognosis very poor  LTACH placement     Electronically signed by Carlos Akers MD

## 2022-04-14 NOTE — PROGRESS NOTES
Occupational Therapy  Facility/Department: [facility] CAR 2  Daily Treatment Note  NAME: Ama Byers  : 1975  MRN: [id number]    Date of Service: 2022    Discharge Recommendations:  Patient would benefit from continued therapy after discharge       Assessment   Performance deficits / Impairments: Decreased functional mobility ; Decreased balance;Decreased ADL status; Decreased endurance;Decreased strength;Decreased posture  Assessment: Patient demonstrates decreased endurance, balance and strength impacting performance in ADLs. Pt would benefit from continued acute OT services to address functional deficits, promote independence, and decrease caregiver assistance for safe return to prior living arrangement. Prognosis: Fair  OT Education: ADL Adaptive Strategies  Patient Education: bed mobility and strengthening with good return demonstration  REQUIRES OT FOLLOW UP: Yes  Activity Tolerance  Activity Tolerance: Patient Tolerated treatment well;Patient limited by fatigue  Activity Tolerance: Pt tolerated therapy session of 30 minutes of activity focus on ADL/self-care and rolling  Safety Devices  Safety Devices in place: Yes  Type of devices: Call light within reach; Left in bed;Nurse notified  Restraints  Restraints: 4 bed rails up upon entrance         Patient Diagnosis(es): The encounter diagnosis was Acute on chronic congestive heart failure, unspecified heart failure type ([address]       has a past medical history of Acute on chronic diastolic CHF (congestive heart failure) ([address] HIEN (acute kidney injury) ([address] HIEN (acute kidney injury) ([address] Asthma, CHF, Chronic obstructive lung disease ([address] Chronic respiratory failure with hypoxia ([address] COPD, Diabetes mellitus, new onset ([address] Former smoker, HTN, Hyperglycemia, Hyperlipidemia, Hypertension, Morbid obesity with BMI of 60.0-69.9, adult ([address] Neuromuscular disorder ([address] STEPH on CPAP, Oxygen dependent, Pedal edema, Pneumonia, Pulmonary hypertension, moderate to severe ([facility] [address] Torn meniscus, and Wears glasses. has a past surgical history that includes  section (); Abdomen surgery; joint replacement; Cystoscopy (2019); Cystoscopy (N/A, 2019); hc cath power picc triple (2018); pr office/outpt visit,procedure only (N/A, 2018); Tracheotomy (2018); Gastrostomy tube placement (2018); and tracheostomy closure (2018). Restrictions  Restrictions/Precautions  Restrictions/Precautions: Fall Risk,Up as Tolerated,General Precautions  Required Braces or Orthoses?: No  Position Activity Restriction  Other position/activity restrictions: up w/assist. Hi-yair O2 NC 30 Lm. Subjective   General  Patient assessed for rehabilitation services?: Yes  Response to previous treatment: Patient with no complaints from previous session  Family / Caregiver Present: No  Subjective  Subjective: RN ok'd patient for therapy session this afternoon. Pt supine in bed upon FISHER/PTA arrival.  General Comment  Pain Assessment  Pain Assessment: 0-10  Pain Level: 4  Patient's Stated Pain Goal: No pain  Pain Type: Acute pain  Pain Location: Abdomen  Pain Orientation: Left;Distal (under skin fold with touch to clean)  Non-Pharmaceutical Pain Intervention(s): Repositioned  Response to Pain Intervention: Patient Satisfied  Vital Signs  Patient Currently in Pain: Yes   Orientation  Orientation  Overall Orientation Status: Within Functional Limits  Objective    ADL  Grooming: Setup;Stand by assistance  LE Bathing: Maximum assistance;Setup  LE Dressing: Minimal assistance (hospital gown)  Toileting: Maximum assistance  Additional Comments: Pt completed simple groom/wash face and hands.  MIN A don/doff gown, MAX A all desiree hygiene and LB care   Standing Balance  Comment: Pt fatigued following multiple rolls and repositions in bed for sitting or standing  Bed mobility  Rolling to Left: Maximum assistance  Rolling to Right: Maximum assistance  Comment: Pt completing x4 rolls for desiree hygiene and linen changes. Pt demonstrates increased strength and ability to assist with all rolling and reposition to HOB 3x utilizing BUE to pull on bedrails  Transfers  Transfer Comments: Pt fatigue following rolling, hygiene and linen change   Cognition  Overall Cognitive Status: [address]  Times per week: 3-4x  Current Treatment Recommendations: Strengthening,Balance Training,Functional Mobility Training,Self-Care / ADL,Safety Education & Training,Endurance Training    AM-PAC Score   AM-PAC Inpatient Daily Activity Raw Score: 15 (04/14/22 1548)  AM-PAC Inpatient ADL T-Scale Score : 34.69 (04/14/22 1548)  ADL Inpatient CMS 0-100% Score: 56.46 (04/14/22 1548)  ADL Inpatient CMS G-Code Modifier : CK (04/14/22 1548)    Goals  Short term goals  Time Frame for Short term goals: Patient will, by discharge. Short term goal 1: Demo baseline grooming tasks with Mod I  Short term goal 2: Demo 15+ minutes of functional activity tolerance to engage in ADLs  Short term goal 3: Demo 4+/5 gross muscle grade in B UE to improve performance in functional tasks  Short term goal 4: Demo pursed lip breathing techniques with 1 VC for ADL completion  Short term goal 5: Demo functional bed mobility with min A to engage in functional tasks  Short term goal 6: Demo Mod A for functional transfers and functional mobility with LRD for improved independence with ADLs/IADLs (goal added by HANDY Matthews/L in collaboration with SHLOMO Bagley on 04/14/2022)  Short term goal 7: Notify OTR to update POC as patient progresses  Patient Goals   Patient goals :  To return home       Therapy Time   Individual Concurrent Group Co-treatment   Time In 1501         Time Out 1531         Minutes 30         Timed Code Treatment Minutes: 15 Minutes (co-tx with PTA)       NATALIA Collado/MARION

## 2022-04-14 NOTE — PROGRESS NOTES
04/14/22 0815   Respiratory   Respiratory Pattern Regular   Respiratory Depth Normal   Respiratory Quality/Effort Dyspnea with exertion   Chest Assessment Chest expansion symmetrical   L Breath Sounds Diminished   R Breath Sounds Diminished     Respiratory Assessment/Evaluation     [x]         IMPROVE AERATION/BREATH SOUNDS  [x]   ADMINISTER BRONCHODILATOR THERAPY AS APPROPRIATE   [x]   ASSESS BREATH SOUNDS  [x]   PATIENT EDUCATION AS NEEDED    Patient on Heated High Flow Nasal Cannula 30L 55%,   RR 15  Breath Sounds: Diminished    Dangelo Baxter RCP     Patient Assessment complete. CHF (congestive heart failure), NYHA class I, acute on chronic, combined ([facility]) [I50.43]  Acute on chronic congestive heart failure, unspecified heart failure type ([facility] [I50.9] . 04/14/22 0815   Treatment   Treatment Type IS   Incentive Spirometry Tx   Respiratory Effort Dyspnea with Exertion   Treatment Tolerance Fair  (needs encouragement)   Incentive Spirometry Achieved (mL) 800 mL     Incentive Spirometry education and demonstration given by Respiratory Therapy.       Dangelo Baxter RCP  08:15 AM

## 2022-04-14 NOTE — PLAN OF CARE
Problem: Falls - Risk of:  Goal: Will remain free from falls  4/14/2022 0955 by Keysha Rooney RN  Outcome: Ongoing  4/14/2022 0412 by Terrence Cardenas RN  Outcome: Ongoing  Goal: Absence of physical injury  4/14/2022 0955 by Keysha Rooney RN  Outcome: Ongoing  4/14/2022 0412 by Terrence Cardenas RN  Outcome: Ongoing     Problem: Skin Integrity:  Goal: Will show no infection signs and symptoms  4/14/2022 0955 by Keysha Rooney RN  Outcome: Ongoing  4/14/2022 0933 by Rockwell Hatchet, RCP  Outcome: Ongoing  4/14/2022 0412 by Terrence Cardenas RN  Outcome: Ongoing  Goal: Absence of new skin breakdown  4/14/2022 0955 by Keysha Rooney RN  Outcome: Ongoing  4/14/2022 0933 by Rockwell Hatchet, RCP  Outcome: Ongoing  4/14/2022 0412 by Terrence Cardenas RN  Outcome: Ongoing     Problem: Nutrition  Goal: Optimal nutrition therapy  4/14/2022 0955 by Keysha Rooney RN  Outcome: Ongoing  4/14/2022 0412 by Terrence Cardenas RN  Outcome: Ongoing     Problem: Pain:  Description: Pain management should include both nonpharmacologic and pharmacologic interventions.   Goal: Pain level will decrease  4/14/2022 0955 by Keysha Rooney RN  Outcome: Ongoing  4/14/2022 0412 by Terrence Cardenas RN  Outcome: Ongoing  Goal: Control of acute pain  4/14/2022 0955 by Keysha Rooney RN  Outcome: Ongoing  4/14/2022 0412 by Terrence Cardenas RN  Outcome: Ongoing  Goal: Control of chronic pain  4/14/2022 0955 by Keysha Rooney RN  Outcome: Ongoing  4/14/2022 0412 by Terrence Cardenas RN  Outcome: Ongoing

## 2022-04-14 NOTE — CARE COORDINATION
Spoke to Burnside at [place]. He will start precert    [id number] spoke to Burnside.  He will start precert in AM

## 2022-04-14 NOTE — PLAN OF CARE
Problem: RESPIRATORY  Intervention: Respiratory assessment  Note: BRONCHOSPASM/BRONCHOCONSTRICTION     [x]         IMPROVE AERATION/BREATH SOUNDS  [x]   ADMINISTER BRONCHODILATOR THERAPY AS APPROPRIATE  [x]   ASSESS BREATH SOUNDS  [x]   IMPLEMENT AEROSOL/MDI PROTOCOL  [x]   PATIENT EDUCATION AS NEEDED   PROVIDE ADEQUATE OXYGENATION WITH ACCEPTABLE SP02/ABG'S    [x]  IDENTIFY APPROPRIATE OXYGEN THERAPY  [x]   MONITOR SP02/ABG'S AS NEEDED   [x]   PATIENT EDUCATION AS NEEDED

## 2022-04-14 NOTE — PLAN OF CARE
Plan of Care    Problem: Skin Integrity:  Goal: Will show no infection signs and symptoms  Description: Will show no infection signs and symptoms  4/14/2022 0933 by Claudene Crest, RCP  Outcome: Ongoing  4/14/2022 0412 by Era Mcfarlane RN  Outcome: Ongoing  Goal: Absence of new skin breakdown  Description: Absence of new skin breakdown  4/14/2022 0933 by Claudene Crest, RCP  Outcome: Ongoing  4/14/2022 0412 by Era Mcfarlane RN  Outcome: Ongoing     PROVIDE ADEQUATE OXYGENATION WITH ACCEPTABLE SP02/ABG'S     [x]         IDENTIFY APPROPRIATE OXYGEN THERAPY  [x]         MONITOR SP02/ABG'S AS NEEDED   [x]         PATIENT EDUCATION AS NEEDED     NON INVASIVE VENTILATION  PROVIDE OPTIMAL VENTILATION/ACCEPTABLE SP02  IMPLEMENT NON INVASIVE VENTILATION PROTOCOL  ASSESSMENT SKIN INTEGRITY  PATIENT EDUCATION AS NEEDED  BIPAP AS NEEDED

## 2022-04-14 NOTE — PROGRESS NOTES
Physical Therapy  Facility/Department: [facility] CAR 2  Daily Treatment Note  NAME: Jono Ramsey  : 1975  MRN: [id number]    Date of Service: 2022    Discharge Recommendations:  Patient would benefit from continued therapy after discharge   PT Equipment Recommendations  Equipment Needed: No    Assessment   Body structures, Functions, Activity limitations: Decreased functional mobility ; Decreased balance;Decreased ROM; Decreased strength;Decreased endurance; Increased pain  Assessment: Pt required maxA x 1-2 to perform bed mobility, (prev) Pt completed STS with MaxA x2 from EOB to bariatric RW and maintained standing ~1 min. Pt is currently unsafe to perform functional mobility unassisted and is expected to require continued skilled PT to address functional mobility and endurance deficits to return pt to prior level of function. Prognosis: Fair  PT Education: Goals;PT Role;General Safety; Functional Mobility Training;Home Exercise Program  REQUIRES PT FOLLOW UP: Yes  Activity Tolerance  Activity Tolerance: Patient limited by endurance; Patient limited by fatigue;Treatment limited secondary to medical complications (free text)     Patient Diagnosis(es): The encounter diagnosis was Acute on chronic congestive heart failure, unspecified heart failure type ([address] has a past medical history of Acute on chronic diastolic CHF (congestive heart failure) ([address] HIEN (acute kidney injury) ([address] HIEN (acute kidney injury) ([address] Asthma, CHF, Chronic obstructive lung disease ([address] Chronic respiratory failure with hypoxia ([address] COPD, Diabetes mellitus, new onset ([address] Former smoker, HTN, Hyperglycemia, Hyperlipidemia, Hypertension, Morbid obesity with BMI of 60.0-69.9, adult ([address] Neuromuscular disorder ([address] STEPH on CPAP, Oxygen dependent, Pedal edema, Pneumonia, Pulmonary hypertension, moderate to severe ([address] Torn meniscus, and Wears glasses.    has a past surgical history that includes  section (); Abdomen surgery; joint replacement; Cystoscopy (June 5, 2019); Cystoscopy (N/A, 6/5/2019); hc cath power picc triple (2/2/2018); pr office/outpt visit,procedure only (N/A, 2/17/2018); Tracheotomy (02/2018); Gastrostomy tube placement (02/2018); and tracheostomy closure (03/2018). Restrictions  Restrictions/Precautions  Restrictions/Precautions: Fall Risk,Up as Tolerated,General Precautions  Required Braces or Orthoses?: No  Position Activity Restriction  Other position/activity restrictions: up w/assist. Hi-yair O2 NC 30 Lm. Subjective   General  Response To Previous Treatment: Patient with no complaints from previous session. Family / Caregiver Present: No  Subjective  Subjective: Pt and RN agreeable to PT. Pt alert in bed upon arrival, FISHER present for mobility. Pt on high flow O2 upon arrival, pleasant and cooperative throughout. General Comment  Comments: Pt left in bed with call light within reach  Pain Screening  Patient Currently in Pain: Yes  Pain Assessment  Pain Assessment: 0-10  Pain Level: 4  Pain Location: Wrist;Leg;Abdomen  Pain Orientation: Right  Pain Descriptors: Sore  Pain Frequency: Continuous  Pain Onset: On-going  Clinical Progression: Gradually improving  Functional Pain Assessment: Prevents or interferes some active activities and ADLs  Non-Pharmaceutical Pain Intervention(s): Ambulation/Increased Activity; Distraction;Repositioned  Response to Pain Intervention: Patient Satisfied  Vital Signs  Patient Currently in Pain: Yes       Orientation  Orientation  Overall Orientation Status: Within Functional Limits  Cognition      Objective   Bed mobility  Rolling to Left: Maximum assistance  Rolling to Right: Maximum assistance  Comment: multiple trials of rolling completed for activities of hygiene  Transfers  Comment: not attempted this date, pt reporting fatigue after rolling  Ambulation  Ambulation?: No        Exercises  Quad Sets: 10x bilat supine  Gluteal Sets: 10x bilat supine     AM-PAC Score  -[facility] Inpatient Mobility Raw Score : 9 (04/13/22 1530)  AM-PAC Inpatient T-Scale Score : 30.55 (04/13/22 1530)  Mobility Inpatient CMS 0-100% Score: 81.38 (04/13/22 1530)  Mobility Inpatient CMS G-Code Modifier : CM (04/13/22 1530)    Goals  Short term goals  Time Frame for Short term goals: 14  Short term goal 1: Pt to perform bed mobility [place]  Short term goal 2: Pt to attempt functional transfers [place]  Short term goal 3: Demonstrate standing balance of good - to decrease fall risk  Short term goal 4: Actively participate in 30 minutes of therapy to demo increased endurance  Short term goal 5: Pt to ambulate 10ft w/ RW [place]  Patient Goals   Patient goals :  To get well    Plan    Plan  Times per week: 5x/week  Times per day: Daily  Current Treatment Recommendations: Strengthening,Home Exercise Program,ROM,Safety Education & Training,Balance Training,Patient/Caregiver Education & Training,Endurance Training,Functional Mobility Training,Transfer Training,Gait Training  Safety Devices  Type of devices: Call light within reach,Left in bed,Patient at risk for falls,Nurse notified  Restraints  Initially in place: Yes  Restraints: 4 bed rails per pt request     Therapy Time   Individual Concurrent Group Co-treatment   Time In 1505         Time Out 1525         Minutes 20         Timed Code Treatment Minutes: [place] [id number], [place]

## 2022-04-15 NOTE — PROGRESS NOTES
Physical Therapy  Facility/Department: [facility] CAR 2  Daily Treatment Note  NAME: Jose Palafox  : 1975  MRN: [id number]    Date of Service: 4/15/2022    Discharge Recommendations:  Patient would benefit from continued therapy after discharge   PT Equipment Recommendations  Equipment Needed: No    Assessment   Body structures, Functions, Activity limitations: Decreased functional mobility ; Decreased balance;Decreased ROM; Decreased strength;Decreased endurance; Increased pain  Assessment: Pt required modA  to perform bed mobility,  completed STS with ModA from EOB to bariatric RW and maintained standing ~1 minthn additional 3 mins with second attempt. Pt is currently unsafe to perform functional mobility unassisted and is expected to require continued skilled PT to address functional mobility and endurance deficits to return pt to prior level of function. Prognosis: Fair  PT Education: Goals;PT Role;General Safety; Functional Mobility Training;Home Exercise Program;Energy Conservation;Transfer Training  REQUIRES PT FOLLOW UP: Yes  Activity Tolerance  Activity Tolerance: Patient limited by endurance; Patient limited by fatigue;Treatment limited secondary to medical complications (free text)  Activity Tolerance: on high flow O2     Patient Diagnosis(es): The encounter diagnosis was Acute on chronic congestive heart failure, unspecified heart failure type ([address]      has a past medical history of Acute on chronic diastolic CHF (congestive heart failure) ([address] HIEN (acute kidney injury) ([address] HIEN (acute kidney injury) ([address] Asthma, CHF, Chronic obstructive lung disease ([address] Chronic respiratory failure with hypoxia ([address] COPD, Diabetes mellitus, new onset ([address] Former smoker, HTN, Hyperglycemia, Hyperlipidemia, Hypertension, Morbid obesity with BMI of 60.0-69.9, adult ([address] Neuromuscular disorder ([address] STEPH on CPAP, Oxygen dependent, Pedal edema, Pneumonia, Pulmonary hypertension, moderate to severe ([address] Torn meniscus, and Wears glasses. has a past surgical history that includes  section (); Abdomen surgery; joint replacement; Cystoscopy (2019); Cystoscopy (N/A, 2019); hc cath power picc triple (2018); pr office/outpt visit,procedure only (N/A, 2018); Tracheotomy (2018); Gastrostomy tube placement (2018); and tracheostomy closure (2018). Restrictions  Restrictions/Precautions  Restrictions/Precautions: Fall Risk,Up as Tolerated,General Precautions  Required Braces or Orthoses?: No  Position Activity Restriction  Other position/activity restrictions: up w/assist. Hi-yair O2 NC 30 Lm. Subjective   General  Response To Previous Treatment: Patient with no complaints from previous session. Family / Caregiver Present: No  Subjective  Subjective: Pt and RN agreeable to PT. Pt alert in bed upon arrival, on high flow O2 upon arrival, pleasant and cooperative throughout. General Comment  Comments: Pt left in recinr with call light within reach, set up with lunch tray and left with lift pad under her for dependent transfer back to bed. RN aware  SpO2 >92% t/o treatment  Pain Screening  Patient Currently in Pain: Yes  Pain Assessment  Pain Assessment: 0-10  Pain Level: 5  Pain Type: Acute pain  Pain Location: Chest;Wrist;Leg  Pain Orientation: Right  Pain Descriptors: Sore  Pain Frequency: Continuous  Pain Onset: On-going  Clinical Progression: Not changed  Functional Pain Assessment: Prevents or interferes some active activities and ADLs  Non-Pharmaceutical Pain Intervention(s): Ambulation/Increased Activity; Distraction;Repositioned; Therapeutic presence  Response to Pain Intervention: Patient Satisfied  Vital Signs  Patient Currently in Pain: Yes       Orientation  Orientation  Overall Orientation Status: Within Normal Limits  Cognition      Objective   Bed mobility  Rolling to Left: Minimal assistance  Rolling to Right: Minimal assistance  Supine to Sit: Moderate assistance  Sit to Supine: Moderate assistance;2 Person assistance  Scooting: Minimal assistance  Comment: pt able to use bedrails to assist with rolling, HOB elevated to assist with supine to sit transfer. Increased time to complete  Transfers  Sit to Stand: Moderate Assistance  Stand to sit: Moderate Assistance  Bed to Chair: Dependent/Total (with room lift)  Comment: pt completed 2 STS transfers with bariatric RW  Ambulation  Ambulation?: Yes     Balance  Posture: Fair  Sitting - Static: Fair  Sitting - Dynamic: Fair;+  Standing - Static: Fair  Standing - Dynamic: Fair  Comments: standing balance assessed with bariatric RW, pt able to maintain standing ~1 min x1 then additional 3 mins x1, extended time needed for rest between stands  Exercises  Hamstring Sets: 10x bilat supine  Quad Sets: 10x bilat supine  Gluteal Sets: 10x bilat supine  Hip Abduction: 10x bilat supine  Ankle Pumps: 10x bilat supine  Core Strengthening: EOB ~22 mins with supervision     AM-PAC Score  AM-PAC Inpatient Mobility Raw Score : 9 (04/15/22 1410)  AM-PAC Inpatient T-Scale Score : 30.55 (04/15/22 1410)  Mobility Inpatient CMS 0-100% Score: 81.38 (04/15/22 1410)  Mobility Inpatient CMS G-Code Modifier : CM (04/15/22 1410)    Goals  Short term goals  Time Frame for Short term goals: 14  Short term goal 1: Pt to perform bed mobility Marychuy  Short term goal 2: Pt to attempt functional transfers Marychuy  Short term goal 3: Demonstrate standing balance of good - to decrease fall risk  Short term goal 4: Actively participate in 30 minutes of therapy to demo increased endurance  Short term goal 5: Pt to ambulate 10ft w/ RW Marychuy  Patient Goals   Patient goals :  To get well    Plan    Plan  Times per week: 5x/week  Times per day: Daily  Current Treatment Recommendations: Strengthening,Home Exercise Program,ROM,Safety Education & Training,Balance Training,Patient/Caregiver Education & Riki Plater Mobility Training,Transfer Training,Gait Training  Safety Devices  Type of devices: Call light within reach,Left in bed,Patient at risk for falls,Nurse notified  Restraints  Initially in place: No  Restraints: 4 bed rails per pt request     Therapy Time   Individual Concurrent Group Co-treatment   Time In 1103         Time Out 1144         Minutes 41         Timed Code Treatment Minutes: [place], [place]

## 2022-04-15 NOTE — PROGRESS NOTES
Patient Jeffrey Muhammad   MRN -  [id number]   Acct # - [de-identified]   - 1975        Date of evaluation -  4/15/2022    REASON FOR THE CONSULTATION:  Chief Complaint   Patient presents with    Respiratory Distress     Pt arrives per EMS with difficulty breathing. Pt recently treated for pneumonia. Pt given 40mg prednisone per EMS and placed on NRB. HISTORY OF PRESENT ILLNESS:    Amrik Jauregui is a 55y.o. 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 Patient was discharged home recently on supplemental oxygen and insurance had not approved trilogy so she was sent home with her home CPAP. Patient was brought back to the hospital because she was having worsening shortness of breath. Unable to use her CPAP because of fatigue. On arrival EMS found that her saturation was in mid [de-identified]. She was placed on nonrebreather brought to the ER. Pulmonary has been consulted because of high flow requirement and hypoxia. Patient is laying in bed, she is on high flow oxygen 80%, using BiPAP at night. Her BNP was elevated. She is under going diuresis,   On Symbicort, Spiriva and albuterol.        4/15/2022   Subjective      No acute events   Fio2 dec to 50% and flow rate 20 l         PAST MEDICAL HISTORY:       Diagnosis Date    Acute on chronic diastolic CHF [facility]congestive heart failure) ([facility] 09/15/2018    HIEN (acute kidney injury) ([facility] 2019    HIEN (acute kidney injury) ([facility] 2018    Asthma     CHF     Chronic obstructive lung disease (HCC)     Chronic respiratory failure with hypoxia (HCC)     on home O2 therapy    COPD     Diabetes mellitus, new onset ([facility] 2019    Former smoker     quit smoking about 17 months ago/ She has a 22.00 pack-year smoking history    HTN     Hyperglycemia     Hyperlipidemia     Hypertension     Morbid obesity with BMI of 60.0-69.9, adult ([facility]     Neuromuscular disorder ([address]     Neuropathy Right hand    STEPH on CPAP     DME per Dr. Vaishali Spears for CPAP of 18 cmh2o    Oxygen dependent     DME 06/2018 for home oxgyen at 2.5-3 lpm ATC    Pedal edema     Pneumonia     Pulmonary hypertension, moderate to severe ([address] 06/09/2018    Torn meniscus     Wears glasses          Family History:       Problem Relation Age of Onset    Emphysema Mother     Alzheimer's Disease Mother     Other Mother         Blood disorder    High Blood Pressure Father     Arthritis Father     Diabetes Sister     Heart Failure Sister     Kidney Disease Sister     High Blood Pressure Brother     Dementia Maternal Grandmother     High Blood Pressure Maternal Grandmother     Dementia Maternal Grandfather     High Blood Pressure Maternal Grandfather     Cancer Paternal Grandmother     Heart Disease Paternal Grandfather     Diabetes Sister     Heart Failure Sister     Other Sister         Intestinal problems    No Known Problems Sister     No Known Problems Son        SURGICAL HISTORY:   Past Surgical History:   Procedure Laterality Date    ABDOMEN SURGERY      Patient had a PEG tube placed 1/2018, removed 4/2018    [address]  June 5, 2019    CYSTOSCOPY N/A 6/5/2019    CYSTOSCOPY performed by Miller Finney [facility] at [facility]  02/2018    Removed in April 2018    [facility]. CATH POWER PICC TRIPLE  2/2/2018         JOINT REPLACEMENT      RI OFFICE/OUTPT VISIT,PROCEDURE ONLY N/A 2/17/2018    TRACHEOTOMY performed by Sierra Hameed MD at [address]  03/2018    TRACHEOTOMY  02/2018           SOCIAL AND OCCUPATIONAL HEALTH:   Social History     Socioeconomic History    Marital status: Single     Spouse name: None    Number of children: None    Years of education: None    Highest education level: None   Occupational History    None   Tobacco Use    Smoking status: Former Smoker     Packs/day: 1.00 Years: 22.00     Pack years: 22.00     Types: Cigarettes     Start date: 18     Quit date: 1/15/2018     Years since quittin.2    Smokeless tobacco: Never Used   Vaping Use    Vaping Use: Never used   Substance and Sexual Activity    Alcohol use: No    Drug use: No    Sexual activity: Not Currently   Other Topics Concern    None   Social History Narrative    ** Merged History Encounter **          Social Determinants of Health     Financial Resource Strain: Low Risk     Difficulty of Paying Living Expenses: Not hard at all   Food Insecurity: Food Insecurity Present    Worried About [address] [facility] in the Last Year: Never true    Obdulio of Food in the Last Year: Sometimes true   Transportation Needs: No Transportation Needs    Lack of Transportation (Medical): No    Lack of Transportation (Non-Medical): No   Physical Activity: Inactive    Days of Exercise per Week: 0 days    Minutes of Exercise per Session: 0 min   Stress: Stress Concern Present    Feeling of Stress : To some extent   Social Connections: Moderately Integrated    Frequency of Communication with Friends and Family: Three times a week    Frequency of Social Gatherings with Friends and Family: Three times a week    Attends Jew Services: 1 to 4 times per year    Active Member of [address] [place] [address] or Organizations: No    Attends Club or Organization Meetings: 1 to 4 times per year    Marital Status: Never    Intimate Partner Violence: Not At Risk    Fear of Current or Ex-Partner: No    Emotionally Abused: No    Physically Abused: No    Sexually Abused: No   Housing Stability: Unknown    Unable to Pay for Housing in the Last Year: No    Number of [place] in the Last Year: Not on file    Unstable Housing in the Last Year: No        TOBACCO:   reports that she quit smoking about 4 years ago. Her smoking use included cigarettes. She started smoking about 27 years ago. She has a 22.00 pack-year smoking history.  She has never used smokeless tobacco.  ETOH:   reports no history of alcohol use. ALLERGIES:    Allergies   Allergen Reactions    Bee Venom Anaphylaxis     Last bee sting when patient was at 11years of age   Randolph Sulfa Antibiotics Anaphylaxis    Asa [Aspirin]     Aspirin Other (See Comments)     HEART PALPATATIONS      Beta Adrenergic Blockers Other (See Comments)     Asystole and Bradycardia on admission  01/26/2018-2/22/2018 at [facility]    Beta Adrenergic Blockers Other (See Comments)     UNKNOWN      Sulfa Antibiotics        Home Meds:   Prior to Admission medications    Medication Sig Start Date End Date Taking? Authorizing Provider   gabapentin (NEURONTIN) 300 MG capsule Take 1 capsule by mouth 3 times daily for 10 days. 4/13/22 4/23/22 Yes Nani Vela MD   predniSONE (DELTASONE) 20 MG tablet Take 1 tablet by mouth daily for 3 doses 4/14/22 4/17/22 Yes Nani Vela MD   amLODIPine (NORVASC) 5 MG tablet Take 1 tablet by mouth daily 3/28/22   Rod Dubon MD   diazePAM (VALIUM) 5 MG tablet Take 5 mg by mouth every 12 hours as needed for Anxiety.     Historical Provider, MD   folic acid (FOLVITE) 1 MG tablet Take 1 mg by mouth daily    Historical Provider, MD   vitamin B-12 (CYANOCOBALAMIN) 1000 MCG tablet Take 1,000 mcg by mouth daily    Historical Provider, MD   Dulaglutide (TRULICITY) 3.82 HO/7.0DF SOPN Inject 0.75 mg into the skin once a week    Historical Provider, MD   metOLazone (ZAROXOLYN) 2.5 MG tablet Take 2.5 mg by mouth every other day    Historical Provider, MD   glipiZIDE (GLUCOTROL XL) 2.5 MG extended release tablet Take 2.5 mg by mouth  Patient not taking: Reported on 4/9/2022    Historical Provider, MD   SM MUCUS RELIEF 600 MG extended release tablet  10/15/19   Historical Provider, MD   nystatin (MYCOSTATIN) 516094 UNIT/GM cream  10/17/19   Historical Provider, MD   Ergocalciferol (VITAMIN D2 PO) Take 50,000 Units by mouth once a week  Patient not taking: Reported on 3/18/2022    Historical Provider, MD   potassium chloride (KLOR-CON M) 10 MEQ extended release tablet Take 1 tablet by mouth daily 7/25/19   Marco Antonio Fish MD   bumetanide (BUMEX) 1 MG tablet Take 2 mg by mouth 2 times daily    Historical Provider, MD   JANUVIA 50 MG tablet Take 50 mg by mouth daily 6/11/19   Historical Provider, MD   glucose monitoring kit (FREESTYLE) monitoring kit 1 kit by Does not apply route daily 5/8/19   Ciro Luna MD   blood glucose monitor strips Test three  times a day & as needed for symptoms of irregular blood glucose.  5/8/19   Ciro Luna MD   Lancets MISC 1 each by Does not apply route daily 5/8/19   Ciro Luna MD   pantoprazole sodium (PROTONIX) 40 MG PACK packet Take 40 mg by mouth every morning (before breakfast)    Historical Provider, MD   spironolactone (ALDACTONE) 25 MG tablet Take 1 tablet by mouth daily 11/22/18   Carri Kuhn MD   isosorbide dinitrate (ISORDIL) 20 MG tablet Take 1 tablet by mouth 3 times daily 9/20/18   Jennifer Vasquez MD   hydrALAZINE (APRESOLINE) 25 MG tablet Take 1 tablet by mouth every 8 hours 9/20/18   Jennifer Vasquez MD   Blood Pressure KIT 1 Device by Does not apply route 2 times daily 9/20/18   Jose Bowen MD   ferrous sulfate 325 (65 Fe) MG EC tablet Take 325 g by mouth 2 times daily (with meals) 4/20/18   Historical Provider, MD   loratadine (CLARITIN) 10 MG tablet Take 10 mg by mouth daily    Historical Provider, MD   tiotropium (SPIRIVA) 18 MCG inhalation capsule Inhale 1 capsule into the lungs  8/31/17   Historical Provider, MD   sertraline (ZOLOFT) 100 MG tablet Take 100 mg by mouth daily    Historical Provider, MD   atorvastatin (LIPITOR) 40 MG tablet Take 1 tablet by mouth nightly 2/21/18   Ricco Mae MD   albuterol sulfate  (90 Base) MCG/ACT inhaler Inhale 2 puffs into the lungs every 6 hours as needed for Wheezing    Historical Provider, MD   fluticasone (FLONASE) 50 MCG/ACT nasal spray 1 spray by Nasal route daily     Historical Provider, MD   albuterol (PROVENTIL) (2.5 MG/3ML) 0.083% nebulizer solution Take 3 mLs by nebulization every 6 hours as needed for Wheezing. 9/24/14   Benny Donis MD   budesonide-formoterol (SYMBICORT) 160-4.5 MCG/ACT AERO Inhale 2 puffs into the lungs 2 times daily. 9/24/14   Jayleen Douglas MD     CURRENT MEDS :  Scheduled Meds:   furosemide  40 mg IntraVENous BID    lidocaine  1 patch TransDERmal Daily    insulin glargine  20 Units SubCUTAneous Nightly    insulin lispro  0-18 Units SubCUTAneous TID WC    insulin lispro  0-9 Units SubCUTAneous Nightly    sertraline  100 mg Oral Daily    albuterol  2.5 mg Nebulization 4x daily    levofloxacin  750 mg IntraVENous Q24H    amLODIPine  5 mg Oral Daily    atorvastatin  40 mg Oral Nightly    budesonide-formoterol  2 puff Inhalation BID    ferrous sulfate  325 mg Oral BID WC    folic acid  1 mg Oral Daily    hydrALAZINE  25 mg Oral 3 times per day    isosorbide dinitrate  20 mg Oral TID    pantoprazole  40 mg Oral QAM AC    spironolactone  25 mg Oral Daily    tiotropium  2 puff Inhalation Daily    vitamin B-12  1,000 mcg Oral Daily    sodium chloride flush  10 mL IntraVENous 2 times per day    heparin (porcine)  5,000 Units SubCUTAneous 3 times per day    gabapentin  300 mg Oral TID       Continuous Infusions:   sodium chloride Stopped (04/09/22 0636)    dextrose             Review of Systems   Constitutional: Positive for fatigue. Negative for fever. HENT: Negative. Respiratory: Positive for cough and shortness of breath. Negative for wheezing. Cardiovascular: Negative. Gastrointestinal: Negative. Musculoskeletal: Positive for arthralgias. Neurological: Negative.            Vitals:  /78   Pulse 84   Temp 98.2 °F (36.8 °C) (Oral)   Resp 14   Ht 5' 6\" (1.676 m)   Wt (!) 342 lb 9.5 oz (155.4 kg)   SpO2 97%   BMI 55.30 kg/m²     PHYSICAL EXAM:  Neck: Short thick neck, low hanging soft palate, large tongue, large uvula. No adenopathy, no goiter,     Head and neck atraumatic, normocephalic    Lymph nodes-no cervical, supraclavicular lymphadenopathy    Neck-no JVP elevation    Lungs - dec vent bases  No wheeze   No rales     CVS- S1, S2 regular. No S3 no S4, no murmurs    Abdomen-nontender, nondistended. Bowel sounds are present. No organomegaly    Lower extremity-+ edema    Upper extremity-no edema    Neurological-grossly normal cranial nerves. No overt motor deficit         CBC:   Recent Labs     04/13/22  0552 04/14/22  0536 04/15/22  0559   WBC 11.1 10.9 10.5   HGB 8.5* 9.4* 10.0*    247 251     BMP:    Recent Labs     04/13/22 0552 04/14/22 0536 04/15/22  0559    136 140   K 3.4* 4.0 3.7   CL 87* 87* 85*   CO2 36* 40* 42*   BUN 34* 34* 33*   CREATININE 1.21* 1.11* 1.16*   GLUCOSE 134* 216* 153*     Hepatic:   No results for input(s): AST, ALT, ALB, BILITOT, ALKPHOS in the last 72 hours. XR CHEST PORTABLE    Result Date: 4/10/2022  *Stable cardiomegaly with mild central vascular congestion. *Interval increased right perihilar/infrahilar opacity which may represent developing pneumonia. XR CHEST PORTABLE    Result Date: 4/8/2022  Limited negative portable chest radiograph. CONCLUSIONS     Summary  Left ventricle is normal in size. Global left ventricular systolic function is normal. Calculated ejection  fraction 60% by Heart Model. Marked Leftward compression of inter-ventricular septum (\"D-sign\")  indicating RV volume or pressure overload. Right ventricular function appears reduced. Moderately dilated right  ventricular cavity. Moderate to severe tricuspid regurgitation. Severe pulmonary hypertension. Estimated right ventricular systolic pressure  is 79UTFT.   Trivial pulmonic insufficiency.     Signature  ----------------------------------------------------------------------------   Electronically signed by Juliet Blum(Sonographer) on 03/22/2022   04:14 PM  ----------------------------------------------------------------------------     ----------------------------------------------------------------------------   Electronically signed by Don Ellington(Interpreting physician) on 03/23/2022   11:48 AM    IMPRESSION:    Patient Active Problem List   Diagnosis Code    Asthma J45.909    Pneumonia J18.9    HTN (hypertension) I10    Torn meniscus S83.209A    Chest pain R07.9    Pulmonary hypertension, moderate to severe ([facility]) I27.20    Morbid obesity with BMI of 50.0-59.9, adult ([facility] E66.01, Z68.43    Obesity hypoventilation syndrome ([facility]) E66.2    H/O tracheostomy Z98.890    STEPH (obstructive sleep apnea) G47.33    Right heart failure, unspecified ([facility]) I50.810    Acute on chronic diastolic heart failure ([facility]) I50.33    Acute on chronic congestive heart failure ([facility]) I50.9    Diabetes mellitus, new onset ([facility] E11.9    Dizziness R42    Normocytic anemia D64.9    Pneumonia of both lower lobes due to infectious organism J18.9    NSTEMI (non-ST elevated myocardial infarction) ([facility]) I21.4    Acute pulmonary edema ([facility]) J81.0    Hypertensive emergency I16.1    Acute respiratory failure with hypoxia and hypercapnia ([facility]) J96.01, J96.02    Smoker F17.200    Morbid obesity ([facility]) E66.01    SOB (shortness of breath) R06.02    COPD exacerbation ([facility]) J44.1    ARDS (adult respiratory distress syndrome) ([facility]) J80    Fever R50.9    Disease due to rhinovirus B34.8    Encounter for PEG (percutaneous endoscopic gastrostomy) ([facility] Z43.1    Status post tracheostomy ([facility] Z93.0    Status post insertion of percutaneous endoscopic gastrostomy (PEG) tube ([facility] Z93.1    Rhinovirus infection B34.8    Acute non-recurrent pansinusitis J01.40    Chronic respiratory failure (HCC) J96.10    Pulmonary hypertension (HCC) I27.20    Chronic narcotic dependence (HCC) F11.20    Chronically on benzodiazepine therapy Z79.899    Neuropathy G62.9    Epigastric pain R10.13  Muscle spasm M62.838    Dyspnea R06.00    Prediabetes R73.03    Hyperlipidemia E78.5    Hypokalemia E87.6    Hypomagnesemia E83.42    CHF (congestive heart failure), NYHA class I, acute on chronic, combined (HCC) I50.43        :     acute on chronic hypoxic hypercapnic respiratory failure         Chronic respiratory acidosis  Severe pulmonary hypertension group 2 and group 3  STEPH OHS  Chronic obstructive pulmonary disease chronic home oxygen  Morbid obesity  Acute on chronic congestive heart failure  Principal Problem:    CHF (congestive heart failure), NYHA class I, acute on chronic, combined (HCC)  Active Problems:    Asthma    HTN (hypertension)    Acute respiratory failure with hypoxia and hypercapnia (HCC)    Morbid obesity with BMI of 50.0-59.9, adult ([facility])    Obesity hypoventilation syndrome (HCC)    STEPH (obstructive sleep apnea)    Pulmonary hypertension ([facility])    Normocytic anemia    Prediabetes    Hyperlipidemia  Resolved Problems:    * No resolved hospital problems. *       PLAN:      Continue to wean high flow - keep sats 88-92 %   bipap at night and prn during day   Continue bronchodilator therapy  Responded well to lasix 40 mg iv bid - continue for now . Dc planning   Ok to dc to [facility] when arranged           I hope this updates you on my evaluation and clinical thinking. Thank you for allowing me to participate in his care.      Sincerely,      Electronically signed by Juan M Wells MD on 4/15/2022 at 1:39 PM

## 2022-04-15 NOTE — CARE COORDINATION
Transition planning:  Spoke with Ariana Noriega Liaison at [facility] he informs the precert has been started all clinical has been submitted, await insurance authorization.    Pt continues on HFNC 60% 20 L

## 2022-04-15 NOTE — PLAN OF CARE
Problem: Falls - Risk of:  Goal: Will remain free from falls  Description: Will remain free from falls  4/15/2022 1112 by Wilbert Caballero RN  Outcome: Ongoing     Problem: Skin Integrity:  Goal: Will show no infection signs and symptoms  Description: Will show no infection signs and symptoms  4/15/2022 1112 by Wilbert Caballero RN  Outcome: Ongoing     Problem: Discharge Planning:  Goal: Discharged to appropriate level of care  Description: Discharged to appropriate level of care  Outcome: Ongoing

## 2022-04-15 NOTE — PROGRESS NOTES
[facility]  Internal Medicine Teaching Residency Program  Inpatient Daily Progress Note  ______________________________________________________________________________    Patient: Dexter Owens  YOB: 1975   EWO:[id number]    Acct: [de-identified]     Room: 2004/2004-01  Admit date: 4/8/2022  Today's date: 04/15/22  Number of days in the hospital: 7    SUBJECTIVE   Admitting Diagnosis: CHF (congestive heart failure), NYHA class I, acute on chronic, combined ([facility] [address]  CC: SOB  Pt examined at bedside. Chart & results reviewed. No acute events overnight. Patient is afebrile and hemodynamically stable. Patient stated she had difficulty using BiPAP overnight and that she kept waking up. Currently requiring 60% FiO2 on heated high flow nasal cannula. She is on Lasix 40 mg twice daily per pulmonology.  working with [employer] placement and is currently awaiting insurance authorization. ROS:  Constitutional:  negative for chills, fevers, sweats  Respiratory:  negative for cough, dyspnea on exertion, hemoptysis,  wheezing  Cardiovascular: lower extremity edema, palpitations  Gastrointestinal:  negative for abdominal pain, constipation, diarrhea, nausea, vomiting  Neurological:  negative for dizziness, headache    BRIEF HISTORY     The patient is a pleasant 55 y.o. 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 Patient states she has had increased shortness of breath the past 2 nights, unable to wear her CPAP nightly with increased fatigue. Patient is poor historian, states she wears 7 L O2 at home, however EMS on arrival saw patient on 4 L and was saturating in the mid 80s. Patient was placed on nonrebreather by EMS and saturations improved to 98%.     Of note, patient had previous hospitalization earlier this month for COPD exacerbation complicated by pneumonia.   At that time patient was treated with antibiotics and steroids. Pulmonology was consulted and patient was approved for trilogy noninvasive ventilator which she has yet to receive. Echocardiogram at that time showed EF 60%, \"D\" sign indicating RV pressure overload, moderate to severe MR, severe pulmonary hypertension. Patient is aware of these findings.     On my evaluation, patient resting comfortably on 15 L nonrebreather mask acute distress. Patient is morbidly obese with BMI 57. Denies any cough, chest pain, leg pain/swelling. She does have skin irritation on her buttocks. States she has been compliant with all meds (inculding bronchodilators and bumex 2mg BID) except for her PO DM meds as those were held on previous admission. Afebrile, hemodynamic stable, tachycardic in the 110s at present. Patient received prednisone and lasix 40mg IV in ED as well as rocephin and vancomycin. CXR in ED - limited negative CXR d/t body habitus.     Significant labs include proBNP on admission 4192, was in 1000s on recent discharge. Leukocytosis of 17.0. Hemoglobin 9.7. Slight bump in creatinine from baseline- 1.21. glucose 238.       4/10/2022-episode of hypoxia, patient drank 3.5 L of fluid admitted with CHF exacerbation, getting a chest x-ray, IV diuretics to continue, labs de-escalate, prednisone discontinued. 4/11/2022 - Patient has drank 2.5 L in the last 24 hours. Admitted with CHF exacerbation, currently on IV Lasix 80 mg IV twice daily. Did receive this morning. We will get a chest x-ray to look for any worsening pulmonary edema. Mild edema bilateral lower extremities noted. Labs Descalated, prednisone discontinued    4/13/2022 - Overnight BG went up to 423 and then insulin Lantus was increased to 20 nightly.     4/14/2022 -Lasix changed from 80 mg to 40 mg IV twice daily       OBJECTIVE     Vital Signs:  /84   Pulse 68   Temp 98.2 °F (36.8 °C) (Axillary)   Resp 22   Ht 5' 6\" (1.676 m)   Wt (!) 342 lb 9.5 oz (155.4 kg)   SpO2 (!) 86%   BMI 55.30 kg/m²     Temp (24hrs), Av.2 °F (36.8 °C), Min:98 °F (36.7 °C), Max:98.4 °F (36.9 °C)    In: 914.6   Out: 2100 [Urine:2100]    Physical Exam:  Constitutional: This is a well developed, well nourished, Greater than 40 - Morbid Obesity / Extreme Obesity / Grade III 55y.o. year old female who is alert, oriented, cooperative and in no apparent distress. Head:normocephalic and atraumatic. EENT:  PERRLA. No conjunctival injections. Septum was midline, mucosa was without erythema, exudates or cobblestoning. No thrush was noted. Neck: Supple without thyromegaly. No elevated JVP. Trachea was midline. Respiratory: Diminished breath sounds bilaterally limited due to body habitus. Cardiovascular: Regular without murmur, clicks, gallops or rubs. Abdomen: Slightly rounded and soft without organomegaly. No rebound, rigidity or guarding was appreciated. Lymphatic: No lymphadenopathy. Musculoskeletal: Normal curvature of the spine. No gross muscle weakness. Extremities: Mild bilateral lower extremity edema  Skin:  Warm and dry. Good color, turgor and pigmentation. No lesions or scars.   No cyanosis or clubbing  Neurological/Psychiatric: The patient's general behavior, level of consciousness, thought content and emotional status is normal.        Medications:  Scheduled Medications:    furosemide  40 mg IntraVENous BID    lidocaine  1 patch TransDERmal Daily    insulin glargine  20 Units SubCUTAneous Nightly    insulin lispro  0-18 Units SubCUTAneous TID     insulin lispro  0-9 Units SubCUTAneous Nightly    sertraline  100 mg Oral Daily    albuterol  2.5 mg Nebulization 4x daily    levofloxacin  750 mg IntraVENous Q24H    amLODIPine  5 mg Oral Daily    atorvastatin  40 mg Oral Nightly    budesonide-formoterol  2 puff Inhalation BID    ferrous sulfate  325 mg Oral BID WC    folic acid  1 mg Oral Daily    hydrALAZINE  25 mg Oral 3 times per day    isosorbide dinitrate  20 mg Oral TID    pantoprazole  40 mg Oral QAM AC    spironolactone  25 mg Oral Daily    tiotropium  2 puff Inhalation Daily    vitamin B-12  1,000 mcg Oral Daily    sodium chloride flush  10 mL IntraVENous 2 times per day    heparin (porcine)  5,000 Units SubCUTAneous 3 times per day    gabapentin  300 mg Oral TID     Continuous Infusions:    sodium chloride Stopped (04/09/22 0636)    dextrose       PRN MedicationsoxyCODONE-acetaminophen, 1 tablet, Q6H PRN  oxyCODONE, 5 mg, Q4H PRN  acetaminophen, 650 mg, Q4H PRN  sodium chloride, 1 spray, PRN  albuterol sulfate HFA, 2 puff, Q6H PRN  diazePAM, 5 mg, Q12H PRN  sodium chloride flush, 10 mL, PRN  sodium chloride, , PRN  magnesium sulfate, 1,000 mg, PRN  ondansetron, 4 mg, Q8H PRN   Or  ondansetron, 4 mg, Q6H PRN  polyethylene glycol, 17 g, Daily PRN  glucose, 15 g, PRN  dextrose, 12.5 g, PRN  glucagon (rDNA), 1 mg, PRN  dextrose, 100 mL/hr, PRN  albuterol, 2.5 mg, As Directed RT PRN        Diagnostic Labs:  CBC:   Recent Labs     04/13/22  0552 04/14/22  0536 04/15/22  0559   WBC 11.1 10.9 10.5   RBC 3.31* 3.61* 3.84*   HGB 8.5* 9.4* 10.0*   HCT 28.8* 32.0* 34.0*   MCV 87.0 88.6 88.5   RDW 15.9* 16.2* 16.4*    247 251     BMP:   Recent Labs     04/13/22  0552 04/14/22  0536 04/15/22  0559    136 140   K 3.4* 4.0 3.7   CL 87* 87* 85*   CO2 36* 40* 42*   BUN 34* 34* 33*   CREATININE 1.21* 1.11* 1.16*     BNP: No results for input(s): BNP in the last 72 hours. PT/INR: No results for input(s): PROTIME, INR in the last 72 hours. APTT: No results for input(s): APTT in the last 72 hours. CARDIAC ENZYMES: No results for input(s): CKMB, CKMBINDEX, TROPONINI in the last 72 hours.     Invalid input(s): CKTOTAL;3  FASTING LIPID PANEL:  Lab Results   Component Value Date    CHOL 144 11/17/2018    HDL 42 10/07/2020    TRIG 68 11/17/2018     LIVER PROFILE:   No results for input(s): AST, ALT, ALB, BILIDIR, BILITOT, ALKPHOS in the last 72 hours.   MICROBIOLOGY:   Lab Results   Component Value Date/Time    CULTURE NO GROWTH 5 DAYS 04/09/2022 02:22 AM       Imaging:    XR CHEST PORTABLE    Result Date: 4/8/2022  Limited negative portable chest radiograph. ECHO Complete 2D W Doppler W Color    Result Date: 3/22/2022  Transthoracic Echocardiography Report (TTE)  Patient Name PRICE       Date of Study               03/22/2022               Teja Sitter   Date of      1975  Gender                      Female  Birth   Age          55 year(s)  Race                        Black   Room Number  2008        Height:                     65.98 inch, 167.6 cm   Corporate ID [id number]    Weight:                     380 pounds, 172.4 kg  #   Patient Acct [de-identified]   BSA:          2.63 m^2      BMI:      61.36  #                                                              kg/m^2   MR #         [id number]     [facility]   Accession #  [id number]  Interpreting Physician      [address] [place] [address]   Fellow                   Referring Nurse                           Practitioner   Interpreting             Referring Physician         [facility]  Fellow  Additional Comments Technically difficult study. Type of Study   TTE procedure:2D Echocardiogram, M-Mode, Doppler, Color Doppler. Procedure Date Date: 03/22/2022 Start: 03:37 PM Study Location: [facility] Technical Quality: Adequate visualization Indications:Dyspnea/SOB. History / Tech. Comments: Echo done at patient bedside. Procedure explained to patient. HTN, COPD, PHTN, STEPH, HX NSTEMI Patient Status: Inpatient Height: 65.98 inches Weight: 380 pounds BSA: 2.63 m^2 BMI: 61.36 kg/m^2 Allergies   - Aspirin.   - Sulfa. CONCLUSIONS Summary Left ventricle is normal in size. Global left ventricular systolic function is normal. Calculated ejection fraction 60% by Heart Model.  Marked Leftward compression of inter-ventricular septum (\"D-sign\") indicating RV volume or pressure overload. Right ventricular function appears reduced. Moderately dilated right ventricular cavity. Moderate to severe tricuspid regurgitation. Severe pulmonary hypertension. Estimated right ventricular systolic pressure is [id number]. Trivial pulmonic insufficiency. Signature ----------------------------------------------------------------------------  Electronically signed by Juliet Mccarty(Sonographer) on 03/22/2022  04:14 PM ---------------------------------------------------------------------------- ----------------------------------------------------------------------------  Electronically signed by Don Ellington(Interpreting physician) on 03/23/2022  11:48 AM ---------------------------------------------------------------------------- FINDINGS Left Atrium Left atrium is normal in size. Left Ventricle Left ventricle is normal in size. Global left ventricular systolic function is normal. Calculated ejection fraction 60% by Heart Model. Marked Leftward compression of inter-ventricular septum (\"D-sign\") indicating RV volume or pressure overload. Right Atrium Right atrium is normal in size. Right Ventricle Right ventricular function appears reduced. Moderately dilated right ventricular cavity. Mitral Valve Normal mitral valve structure and function. No mitral regurgitation. Aortic Valve Aortic valve is trileaflet and opens adequately. No aortic insufficiency. Tricuspid Valve No obvious valvular abnormality. Moderate to severe tricuspid regurgitation. Severe pulmonary hypertension. Estimated right ventricular systolic pressure is [id number]. Pulmonic Valve The pulmonic valve is normal in structure. Trivial pulmonic insufficiency. Pericardial Effusion No pericardial effusion seen. Miscellaneous E/E' average = 7.1. IVC normal diameter & inspiratory collapse indicating normal RA filling pressure .  M-mode / 2D Measurements & Calculations:   LVIDd:3.4 cm(3.7 - 5.6 cm)       Diastolic TPJTTZ:83.8 ml  THUDS:4.2 cm(2.2 - 4.0 cm) Systolic LVMSHO:40.6 ml  VSZW:7.8 cm(0.6 - 1.1 cm)        Aortic Root:2.8 cm(2.0 - 3.7 cm)  LVPWd:1 cm(0.6 - 1.1 cm)         LA Dimension: 2.4 cm(1.9 - 4.0 cm)  Fractional Shortenin.24 %    LA volume/Index: 47.93 ml /18m^2  Calculated LVEF (%): 60.84 %     LVOT:1.7 cm                                   RVDd:4.91 cm   Mitral:                                 Aortic   Valve Area (P1/2-Time): 3.86 cm^2       Peak Velocity: 1.66 m/s  Peak E-Wave: 0.75 m/s                   Mean Velocity: 1.04 m/s  Peak A-Wave: 0.60 m/s                   Peak Gradient: 11.02 mmHg  E/A Ratio: 1.25                         Mean Gradient: 5 mmHg  Peak Gradient: 2.25 mmHg  Mean Gradient: 2 mmHg  Deceleration Time: 195 msec             Area (continuity): 1.37 cm^2  P1/2t: 57 msec                          AV VTI: 29.9 cm   Area (continuity): 1.96 cm^2  Mean Velocity: 0.75 m/s   Tricuspid:                              Pulmonic:   Peak TR Velocity: 4.34 m/s              Peak Velocity: 1.22 m/s  Peak TR Gradient: 75.3424 mmHg          Peak Gradient: 5.95 mmHg  Diastology / Tissue Doppler Septal Wall E' velocity:0.10 m/s Septal Wall E/E':7.7 Lateral Wall E' velocity:0.11 m/s Lateral Wall E/E':6.6    XR ABDOMEN (KUB) (SINGLE AP VIEW)    Result Date: 2022  EXAMINATION: ONE SUPINE XRAY VIEW(S) OF THE ABDOMEN 2022 12:45 pm COMPARISON: CT dated 2019. HISTORY: ORDERING SYSTEM PROVIDED HISTORY: Constipation, right lower abdominal  pain TECHNOLOGIST PROVIDED HISTORY: Constipation, right lower abdominal  pain Reason for Exam: supine FINDINGS: Nonspecific bowel gas pattern is seen with gaseous distension of the stomach. Mild-to-moderate amount of stool is noted in the colon. Evaluation of free air is limited on this supine view. There appears to be Trujillo catheter in place. Nonspecific bowel gas pattern with mild-to-moderate amount of stool noted in the colon. Gaseous distention of the stomach.      XR CHEST PORTABLE    Result Date: 4/10/2022  EXAMINATION: ONE XRAY VIEW OF THE CHEST 4/10/2022 9:12 am COMPARISON: 04/08/2022 HISTORY: Reason for Exam: Acute hypoxia admitted with Pul edema FINDINGS: Stable cardiomegaly. Mild central vascular congestion. Interval increased right perihilar/infrahilar opacity. No sizable effusion or discernible pneumothorax. Osseous structures grossly intact. *Stable cardiomegaly with mild central vascular congestion. *Interval increased right perihilar/infrahilar opacity which may represent developing pneumonia. XR CHEST PORTABLE    Result Date: 4/8/2022  EXAMINATION: ONE XRAY VIEW OF THE CHEST 4/8/2022 4:37 pm COMPARISON: 03/22/2022 HISTORY: ORDERING SYSTEM PROVIDED HISTORY: Shortness of breath, COPD and CHF, recent admission for pneumonia TECHNOLOGIST PROVIDED HISTORY: Shortness of breath, COPD and CHF, recent admission for pneumonia Reason for Exam: upr,sob, FINDINGS: Examination is limited by the patient's body habitus and by portable technique. Cardial pericardial silhouette is prominent but stable. There are low lung volumes is secondary bronchovascular crowding. No focal infiltrate is identified. No pneumothorax. No free air. No acute bony abnormality. Limited negative portable chest radiograph. XR CHEST PORTABLE    Result Date: 3/22/2022  EXAMINATION: ONE XRAY VIEW OF THE CHEST 3/22/2022 9:30 am COMPARISON: 03/19/2022 HISTORY: ORDERING SYSTEM PROVIDED HISTORY: improvement in pnuemonia TECHNOLOGIST PROVIDED HISTORY: improvement in pnuemonia Reason for Exam: ap uprt port chest FINDINGS: As compared to prior examination, there has been significant improvement in the right lower lobe infiltrate. Lungs appear clear. There is cardiomegaly noted. Bony structures unremarkable. Improvement in the the right basal infiltrate.      XR CHEST PORTABLE    Result Date: 3/19/2022  EXAMINATION: ONE XRAY VIEW OF THE CHEST 3/19/2022 12:51 am COMPARISON: CT PA performed approximately 10 hours earlier. Portable chest obtained approximately 12 hours earlier. HISTORY: ORDERING SYSTEM PROVIDED HISTORY: Increasing O2 requirment TECHNOLOGIST PROVIDED HISTORY: Increasing O2 requirment Reason for Exam: shortness of breath, chest pain off and on   upright port FINDINGS: Mild cardiomegaly and normal pulmonary vasculature. Right worse than left bibasilar patchy airspace opacities which appear worse than exam 12 hours earlier. No pneumothorax or pleural effusion. Surrounding structures are unremarkable. Findings suggestive of worsening bibasilar pneumonia. XR CHEST PORTABLE    Result Date: 3/18/2022  EXAMINATION: ONE XRAY VIEW OF THE CHEST 3/18/2022 11:16 am COMPARISON: Chest x-ray dated 29 June 2021 HISTORY: ORDERING SYSTEM PROVIDED HISTORY: sob TECHNOLOGIST PROVIDED HISTORY: sob FINDINGS: Mild stable cardiomegaly with pulmonary vascular congestion. No pneumothorax or pleural effusion. Stable cardiomegaly with mild pulmonary vascular congestion. CT CHEST PULMONARY EMBOLISM W CONTRAST    Result Date: 3/18/2022  EXAMINATION: CTA OF THE CHEST 3/18/2022 1:23 pm TECHNIQUE: CTA of the chest was performed after the administration of intravenous contrast.  Multiplanar reformatted images are provided for review. MIP images are provided for review. Dose modulation, iterative reconstruction, and/or weight based adjustment of the mA/kV was utilized to reduce the radiation dose to as low as reasonably achievable. COMPARISON: None HISTORY: ORDERING SYSTEM PROVIDED HISTORY: sedentary lifestyle, leg swelling, increased O2 TECHNOLOGIST PROVIDED HISTORY: sedentary lifestyle, leg swelling, increased O2 Decision Support Exception - unselect if not a suspected or confirmed emergency medical condition->Emergency Medical Condition (MA) Reason for Exam: sedentary lifestyle, leg swelling, increased O2 FINDINGS: Pulmonary Arteries: Pulmonary arteries are adequately opacified for evaluation.   No evidence of intraluminal filling defect to suggest pulmonary embolism. Main pulmonary artery is normal in caliber. Mediastinum: No evidence of mediastinal lymphadenopathy. Small reactive lymph nodes seen in the mediastinum and hilar regions. The heart and pericardium demonstrate no acute abnormality. There is no acute abnormality of the thoracic aorta. Lungs/pleura: Patchy ground-glass opacity and increased markings seen in the lower lung fields bilaterally concerning for bibasilar infiltrate. No pleural effusion or pneumothorax. No pulmonary mass or nodule. Patchy ground-glass opacity in the upper lung fields bilaterally. Upper Abdomen: Limited images of the upper abdomen are unremarkable. Soft Tissues/Bones: No acute bone or soft tissue abnormality. Degenerative changes seen within the spine with no chest wall abnormality. No evidence of pulmonary embolism. There is patchy ill-defined opacification lower lung fields bilaterally suggesting infiltrate with ground-glass opacity concerning for pneumonia as well in the upper lung fields. Reactive adenopathy throughout the mediastinum and hilar regions. RECOMMENDATIONS: Unavailable     VL DUP LOWER EXTREMITY VENOUS BILATERAL    Result Date: 3/19/2022    [facility]  Vascular Lower Extremities DVT Study Procedure   Patient Name  CARMEN       Date of Study           03/18/2022                [address] [facility]   Date of Birth 1975  Gender                  Female   Age           55 year(s)  Race                    Black   Room Number   2008        Height:                 65.98 inch, 167.6 cm   Corporate ID  [id number]    Weight:                 380 pounds, 172.4 kg  #   Patient Acct  [de-identified]   BSA:        2.63 m^2    BMI:       61.36 kg/m^2  #   MR #          [id number]     Sonographer             Jillian Coleman   Accession #   [id number]  Interpreting Physician  Nguyen Park   Referring                 Referring Physician     Michael Vail.  Olaf Ibrahim DO  Nurse Practitioner  Procedure Type of Study:   Veins: Lower Extremities DVT Study, Venous Scan Lower Bilateral.  Indications for Study:Leg Swelling and Shortness of breath. Patient Status:ER. Technical Quality:Limited visualization. Limitation reason:bedside exam , body habitus, deep vessels, edema. Conclusions   Summary   No evidence of superficial or deep venous thrombosis in both lower  extremities. Signature   ----------------------------------------------------------------  Electronically signed by NIKKI Chavez(Sonographer) on  03/18/2022 01:27 PM  ----------------------------------------------------------------   ----------------------------------------------------------------  Electronically signed by Suzon Ham Reyes,Arthur(Interpreting  physician) on 03/19/2022 07:28 AM  ----------------------------------------------------------------  Findings:   Right Impression:                    Left Impression:  The common femoral, femoral,         The common femoral, femoral,  popliteal and tibial veins           popliteal and tibial veins  demonstrate normal compressibility   demonstrate normal compressibility  and augmentation. and augmentation. Normal compressibility of the great  Normal compressibility of the great  saphenous vein. saphenous vein. Normal compressibility of the small  Normal compressibility of the small  saphenous vein. saphenous vein. Limited visualization of the         Limited visualization of the  posterior tibial and peroneal veins. posterior tibial and peroneal veins. Risk Factors History +-------------------------------------------+----------+-------------------+ ! Diagnosis                                  ! Date      ! Comments           ! +-------------------------------------------+----------+-------------------+ ! Previous Scan                              !04/04/2008! WNL                ! +-------------------------------------------+----------+-------------------+ ! Chronic lung disease->COPD                 !          !                   ! +-------------------------------------------+----------+-------------------+ ! Previous Scan                              !09/22/2014! Bilateral WNL      ! +-------------------------------------------+----------+-------------------+ ! CHF                                        ! !                   ! +-------------------------------------------+----------+-------------------+   - The patient's risk factor(s) include: chronic lung disease, dyslipidemia     and arterial hypertension.   - The patient has a former tobacco history. Allergies   - Allergy:Aspirin(Drug). - Allergy:Sulfa(Drug). Velocities are measured in cm/s ; Diameters are measured in cm Right Lower Extremities DVT Study Measurements Right 2D Measurements +------------------------------------+----------+---------------+----------+ ! Location                            ! Visualized! Compressibility! Thrombosis! +------------------------------------+----------+---------------+----------+ ! Common Femoral                      !Yes       ! Yes            ! None      ! +------------------------------------+----------+---------------+----------+ ! Prox Femoral                        !Yes       ! Yes            ! None      ! +------------------------------------+----------+---------------+----------+ ! Mid Femoral                         !Partial   !Yes            ! None      ! +------------------------------------+----------+---------------+----------+ ! Dist Femoral                        !Partial   !Yes            ! None      ! +------------------------------------+----------+---------------+----------+ ! Popliteal                           !Yes       ! Yes            ! None      ! +------------------------------------+----------+---------------+----------+ ! Sapheno Femoral Junction            ! Yes       ! Yes !None      ! +------------------------------------+----------+---------------+----------+ ! PTV                                 ! Partial   !Yes            ! None      ! +------------------------------------+----------+---------------+----------+ ! Peroneal                            !Partial   !Yes            ! None      ! +------------------------------------+----------+---------------+----------+ ! Gastroc                             ! Yes       ! Yes            ! None      ! +------------------------------------+----------+---------------+----------+ ! GSV Thigh                           ! Yes       ! Yes            ! None      ! +------------------------------------+----------+---------------+----------+ ! GSV Knee                            ! Yes       ! Yes            ! None      ! +------------------------------------+----------+---------------+----------+ ! GSV Ankle                           ! Yes       ! Yes            ! None      ! +------------------------------------+----------+---------------+----------+ ! SSV                                 ! Yes       ! Yes            ! None      ! +------------------------------------+----------+---------------+----------+ Right Doppler Measurements +--------------------------+---------+------+------------------------------+ ! Location                  ! Signal   !Reflux! Reflux (msec)                 ! +--------------------------+---------+------+------------------------------+ ! Common Femoral            !Pulsatile!      !                              ! +--------------------------+---------+------+------------------------------+ ! Prox Femoral              !Pulsatile!      !                              ! +--------------------------+---------+------+------------------------------+ ! Popliteal                 !Pulsatile!      !                              ! +--------------------------+---------+------+------------------------------+ Left Lower Extremities DVT Study Measurements Left 2D Measurements +------------------------------------+----------+---------------+----------+ ! Location                            ! Visualized! Compressibility! Thrombosis! +------------------------------------+----------+---------------+----------+ ! Common Femoral                      !Yes       ! Yes            ! None      ! +------------------------------------+----------+---------------+----------+ ! Prox Femoral                        !Yes       ! Yes            ! None      ! +------------------------------------+----------+---------------+----------+ ! Mid Femoral                         !Partial   !Yes            ! None      ! +------------------------------------+----------+---------------+----------+ ! Dist Femoral                        !Partial   !Yes            ! None      ! +------------------------------------+----------+---------------+----------+ ! Popliteal                           !Yes       ! Yes            ! None      ! +------------------------------------+----------+---------------+----------+ ! Sapheno Femoral Junction            ! Yes       ! Yes            ! None      ! +------------------------------------+----------+---------------+----------+ ! PTV                                 ! Partial   !Yes            ! None      ! +------------------------------------+----------+---------------+----------+ ! Peroneal                            !Partial   !Yes            ! None      ! +------------------------------------+----------+---------------+----------+ ! Gastroc                             ! Yes       ! Yes            ! None      ! +------------------------------------+----------+---------------+----------+ ! GSV Thigh                           ! Yes       ! Yes            ! None      ! +------------------------------------+----------+---------------+----------+ ! GSV Knee                            ! Yes       ! Yes            ! None      ! +------------------------------------+----------+---------------+----------+ ! GSV Ankle !Yes       !Yes            ! None      ! +------------------------------------+----------+---------------+----------+ ! SSV                                 ! Yes       ! Yes            ! None      ! +------------------------------------+----------+---------------+----------+ Left Doppler Measurements +--------------------------+---------+------+------------------------------+ ! Location                  ! Signal   !Reflux! Reflux (msec)                 ! +--------------------------+---------+------+------------------------------+ ! Common Femoral            !Pulsatile!      !                              ! +--------------------------+---------+------+------------------------------+ ! Prox Femoral              !Pulsatile!      !                              ! +--------------------------+---------+------+------------------------------+ ! Popliteal                 !Pulsatile!      !                              ! +--------------------------+---------+------+------------------------------+    FL MODIFIED BARIUM SWALLOW W VIDEO    Result Date: 3/19/2022  EXAMINATION: MODIFIED BARIUM SWALLOW WAS PERFORMED IN CONJUNCTION WITH SPEECH PATHOLOGY SERVICES TECHNIQUE: Fluoroscopic evaluation of the swallowing mechanism was performed using cineradiography with multiple consistency of barium product in conjunction with speech pathology services. FLUOROSCOPY DOSE AND TYPE OR TIME AND EXPOSURES: Fluoro time: 1.1 minute DAP: 22.570 mGy COMPARISON: None HISTORY: ORDERING SYSTEM PROVIDED HISTORY: h/o esophageal stricture per patient TECHNOLOGIST PROVIDED HISTORY: h/o esophageal stricture per patient 30-year-old female with history of esophageal stricture FINDINGS: No penetration or aspiration with the thin liquid and thick liquid substance by straw, pureed/pudding thick, soft solid and cookie solid substances. No penetration or aspiration with the above administered substances.  Please see separate speech pathology report for full discussion of findings and recommendations. ASSESSMENT & PLAN     ASSESSMENT / PLAN:     Principal Problem:    CHF (congestive heart failure), NYHA class I, acute on chronic, combined ([address]  Active Problems:    Asthma    HTN (hypertension)    Acute respiratory failure with hypoxia and hypercapnia (HCC)    Morbid obesity with BMI of 50.0-59.9, adult (HCC)    Obesity hypoventilation syndrome (HCC)    STEPH (obstructive sleep apnea)    Pulmonary hypertension (HCC)    Normocytic anemia    Prediabetes    Hyperlipidemia  Resolved Problems:    * No resolved hospital problems. *      Acute hypoxic respiratory failure likely secondary to pulmonary edema with underlying  chronic congestive heart failure  - Echo from 3/22/2022 shows EF 60 %  moderately dilated right ventricle. Moderate to severe tricuspid regurgitation. Severe pulmonary hypertension with estimated right ventricular systolic pressure 80 mmHg      -Continue with IV diuresis furosemide 40 mg IV twice daily to be continued on discharge per Dr. Ted Steinberg. CXR 4/10/22   *Interval increased right perihilar/infrahilar opacity which may represent   developing pneumonia.     -Continue with BiPAP overnight  -Strict I&O's, fluid restriction to 1.5 L      History of asthma - Pulmonology on board. Recommend to continue on bronchodilators-albuterol, Spiriva and LABA plus ICS    STEPH/OHS- bipap nightly    Essential HTN  Continue home med Norvasc 5 mg oral daily. .    Anemia of chronic disease Ferritin elevated at 327. iron 36, TIBC 239, iron saturation 15. Hemoglobin 8.7 and stable. Monitor H&H to monitor for any signs of bleeding. pre-DM  a1c 6.4  Med dose sliding scale  Insulin Lantus 10-->20 U SC nightly  Patient was not taking PO meds  POCT glucose 4xdaily    DVT ppx: heparin  GI ppx: protonix   Diet: Regular    Patient has been seen by palliative care on 4/12/2022. Patient and family want everything to be done to the patient.   CODE STATUS is full code. PT/OT/SW : On board. Discharge Planning:  Needs LTACH  placement. Discharge planning in progress and is being assisted by the . Possible discharge to [facility]. Gemma Blair MD  Internal Medicine Resident, PGY-1  [facility]; [facility]  4/15/2022, 8:00 AM      Attending Physician Statement  I have discussed the care of Jose Palafox and I have examined the patient myselft and taken ros and hpi , including pertinent history and exam findings,  with the resident. I have reviewed the key elements of all parts of the encounter with the resident. I agree with the assessment, plan and orders as documented by the resident.   AC on ch hypoxic resp failure , on high flow 55%, 30L oxygen  STEPH/OHS  COPD   Severe Pulm HTN   MO   PNA,   Anemia of CD   Ac CHF   Inhalers Symbicort/spiriva, alb prn   Trilogy advised, insurance to approve   Palliative care consult appreciated   Overall prognosis very poor  LTACH placement   working with PT/OT      Electronically signed by Justus Bob MD

## 2022-04-16 NOTE — PROGRESS NOTES
04/16/22 0809   Respiratory   Respiratory Pattern Regular   Respiratory Depth Shallow   Respiratory Quality/Effort Unlabored   Chest Assessment Chest expansion symmetrical   L Breath Sounds Diminished   R Breath Sounds Clear;Diminished     Respiratory Assessment/Evaluation     [x]         IMPROVE AERATION/BREATH SOUNDS  [x]   ADMINISTER BRONCHODILATOR THERAPY AS APPROPRIATE  [x]   ASSESS BREATH SOUNDS  [x]   PATIENT EDUCATION AS NEEDED    Patient on Heated High Flow Nasal Cannula 25L 55%  RR 18  Breath Sounds: R Clear/Diminished, L Diminished    Larinda Pod, RCP     Patient Assessment complete. CHF (congestive heart failure), NYHA class I, acute on chronic, combined (HCC) [I50.43]  Acute on chronic congestive heart failure, unspecified heart failure type ([facility] [address] [I50.9] .

## 2022-04-16 NOTE — PROGRESS NOTES
[facility]  Internal Medicine Teaching Residency Program  Inpatient Daily Progress Note  ______________________________________________________________________________    Patient: Jenny Gomes  YOB: 1975   OQW:[id number]    Acct: [de-identified]     Room: 2004/2004-01  Admit date: 4/8/2022  Today's date: 04/16/22  Number of days in the hospital: 8    SUBJECTIVE   Admitting Diagnosis: CHF (congestive heart failure), NYHA class I, acute on chronic, combined ([facility] [address]  CC: SOB  Pt examined at bedside. Chart & results reviewed. No acute events overnight. Patient is afebrile and hemodynamically stable. Patient reported complaints of shortness of breath. FiO2 decreased from 60 to 55%. Saturating 90% on heated high flow nasal cannula. Plan for today   working with [employer] placement and is currently awaiting insurance authorization. Pre-CERT pending    ROS:  Constitutional:  negative for chills, fevers, sweats  Respiratory:  negative for cough, dyspnea on exertion, hemoptysis,  wheezing  Cardiovascular: lower extremity edema, palpitations  Gastrointestinal:  negative for abdominal pain, constipation, diarrhea, nausea, vomiting  Neurological:  negative for dizziness, headache    BRIEF HISTORY     The patient is a pleasant 55 y.o. 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 Patient states she has had increased shortness of breath the past 2 nights, unable to wear her CPAP nightly with increased fatigue. Patient is poor historian, states she wears 7 L O2 at home, however EMS on arrival saw patient on 4 L and was saturating in the mid 80s. Patient was placed on nonrebreather by EMS and saturations improved to 98%.     Of note, patient had previous hospitalization earlier this month for COPD exacerbation complicated by pneumonia.   At that time patient was treated with antibiotics and steroids. Pulmonology was consulted and patient was approved for trilogy noninvasive ventilator which she has yet to receive. Echocardiogram at that time showed EF 60%, \"D\" sign indicating RV pressure overload, moderate to severe MR, severe pulmonary hypertension. Patient is aware of these findings.     On my evaluation, patient resting comfortably on 15 L nonrebreather mask acute distress. Patient is morbidly obese with BMI 57. Denies any cough, chest pain, leg pain/swelling. She does have skin irritation on her buttocks. States she has been compliant with all meds (inculding bronchodilators and bumex 2mg BID) except for her PO DM meds as those were held on previous admission. Afebrile, hemodynamic stable, tachycardic in the 110s at present. Patient received prednisone and lasix 40mg IV in ED as well as rocephin and vancomycin. CXR in ED - limited negative CXR d/t body habitus.     Significant labs include proBNP on admission 4192, was in 1000s on recent discharge. Leukocytosis of 17.0. Hemoglobin 9.7. Slight bump in creatinine from baseline- 1.21. glucose 238.       4/10/2022-episode of hypoxia, patient drank 3.5 L of fluid admitted with CHF exacerbation, getting a chest x-ray, IV diuretics to continue, labs de-escalate, prednisone discontinued. 4/11/2022 - Patient has drank 2.5 L in the last 24 hours. Admitted with CHF exacerbation, currently on IV Lasix 80 mg IV twice daily. Did receive this morning. We will get a chest x-ray to look for any worsening pulmonary edema. Mild edema bilateral lower extremities noted. Labs Descalated, prednisone discontinued    4/13/2022 - Overnight BG went up to 423 and then insulin Lantus was increased to 20 nightly.     4/14/2022 -Lasix changed from 80 mg to 40 mg IV twice daily       OBJECTIVE     Vital Signs:  /74   Pulse 83   Temp 98.7 °F (37.1 °C) (Oral)   Resp 17   Ht 5' 6\" (1.676 m)   Wt (!) 343 lb (155.6 kg)   SpO2 92%   BMI 55.36 kg/m²     Temp (24hrs), Av.5 °F (36.9 °C), Min:98.2 °F (36.8 °C), Max:99 °F (37.2 °C)    In: 1200   Out: 2400 [Urine:2400]    Physical Exam:  Constitutional: This is a well developed, well nourished, Greater than 40 - Morbid Obesity / Extreme Obesity / Grade III 55y.o. year old female who is alert, oriented, cooperative and in no apparent distress. Head:normocephalic and atraumatic. EENT:  PERRLA. No conjunctival injections. Septum was midline, mucosa was without erythema, exudates or cobblestoning. No thrush was noted. Neck: Supple without thyromegaly. No elevated JVP. Trachea was midline. Respiratory: Breath sounds clear to auscultation bilaterally. No wheezes, crackles or rhonchi. Cardiovascular: Regular without murmur, clicks, gallops or rubs. Abdomen: Slightly rounded and soft without organomegaly. No rebound, rigidity or guarding was appreciated. Lymphatic: No lymphadenopathy. Musculoskeletal: Normal curvature of the spine. No gross muscle weakness. Extremities: Mild bilateral lower extremity edema  Skin:  Warm and dry. Good color, turgor and pigmentation. No lesions or scars.   No cyanosis or clubbing  Neurological/Psychiatric: The patient's general behavior, level of consciousness, thought content and emotional status is normal.        Medications:  Scheduled Medications:    furosemide  40 mg IntraVENous BID    lidocaine  1 patch TransDERmal Daily    insulin glargine  20 Units SubCUTAneous Nightly    insulin lispro  0-18 Units SubCUTAneous TID     insulin lispro  0-9 Units SubCUTAneous Nightly    sertraline  100 mg Oral Daily    albuterol  2.5 mg Nebulization 4x daily    levofloxacin  750 mg IntraVENous Q24H    amLODIPine  5 mg Oral Daily    atorvastatin  40 mg Oral Nightly    budesonide-formoterol  2 puff Inhalation BID    ferrous sulfate  325 mg Oral BID WC    folic acid  1 mg Oral Daily    hydrALAZINE  25 mg Oral 3 times per day    isosorbide dinitrate 20 mg Oral TID    pantoprazole  40 mg Oral QAM AC    spironolactone  25 mg Oral Daily    tiotropium  2 puff Inhalation Daily    vitamin B-12  1,000 mcg Oral Daily    sodium chloride flush  10 mL IntraVENous 2 times per day    heparin (porcine)  5,000 Units SubCUTAneous 3 times per day    gabapentin  300 mg Oral TID     Continuous Infusions:    sodium chloride Stopped (04/09/22 0636)    dextrose       PRN MedicationsoxyCODONE-acetaminophen, 1 tablet, Q6H PRN  oxyCODONE, 5 mg, Q4H PRN  acetaminophen, 650 mg, Q4H PRN  sodium chloride, 1 spray, PRN  albuterol sulfate HFA, 2 puff, Q6H PRN  diazePAM, 5 mg, Q12H PRN  sodium chloride flush, 10 mL, PRN  sodium chloride, , PRN  magnesium sulfate, 1,000 mg, PRN  ondansetron, 4 mg, Q8H PRN   Or  ondansetron, 4 mg, Q6H PRN  polyethylene glycol, 17 g, Daily PRN  glucose, 15 g, PRN  dextrose, 12.5 g, PRN  glucagon (rDNA), 1 mg, PRN  dextrose, 100 mL/hr, PRN  albuterol, 2.5 mg, As Directed RT PRN        Diagnostic Labs:  CBC:   Recent Labs     04/14/22  0536 04/15/22  0559 04/16/22  0630   WBC 10.9 10.5 8.7   RBC 3.61* 3.84* 4.01   HGB 9.4* 10.0* 10.4*   HCT 32.0* 34.0* 35.6*   MCV 88.6 88.5 88.8   RDW 16.2* 16.4* 16.4*    251 257     BMP:   Recent Labs     04/14/22  0536 04/15/22  0559    140   K 4.0 3.7   CL 87* 85*   CO2 40* 42*   BUN 34* 33*   CREATININE 1.11* 1.16*     BNP: No results for input(s): BNP in the last 72 hours. PT/INR: No results for input(s): PROTIME, INR in the last 72 hours. APTT: No results for input(s): APTT in the last 72 hours. CARDIAC ENZYMES: No results for input(s): CKMB, CKMBINDEX, TROPONINI in the last 72 hours. Invalid input(s): CKTOTAL;3  FASTING LIPID PANEL:  Lab Results   Component Value Date    CHOL 144 11/17/2018    HDL 42 10/07/2020    TRIG 68 11/17/2018     LIVER PROFILE:   No results for input(s): AST, ALT, ALB, BILIDIR, BILITOT, ALKPHOS in the last 72 hours.    MICROBIOLOGY:   Lab Results   Component Value Date/Time CULTURE NO GROWTH 5 DAYS 04/09/2022 02:22 AM       Imaging:    XR CHEST PORTABLE    Result Date: 4/8/2022  Limited negative portable chest radiograph. ECHO Complete 2D W Doppler W Color    Result Date: 3/22/2022  Transthoracic Echocardiography Report (TTE)  Patient Name PRICE       Date of Study               03/22/2022               Moncai   Date of      1975  Gender                      Female  Birth   Age          55 year(s)  Race                        Black   Room Number  2008        Height:                     65.98 inch, 167.6 cm   Corporate ID [id number]    Weight:                     380 pounds, 172.4 kg  #   Patient Acct [de-identified]   BSA:          2.63 m^2      BMI:      61.36  #                                                              kg/m^2   MR #         [id number]     Suresh Duran   Accession #  [id number]  Interpreting Physician      [address] [place] [address]   Fellow                   Referring Nurse                           Practitioner   Interpreting             Referring Physician         [facility]  Fellow  Additional Comments Technically difficult study. Type of Study   TTE procedure:2D Echocardiogram, M-Mode, Doppler, Color Doppler. Procedure Date Date: 03/22/2022 Start: 03:37 PM Study Location: [facility] Technical Quality: Adequate visualization Indications:Dyspnea/SOB. History / Tech. Comments: Echo done at patient bedside. Procedure explained to patient. HTN, COPD, PHTN, STEPH, HX NSTEMI Patient Status: Inpatient Height: 65.98 inches Weight: 380 pounds BSA: 2.63 m^2 BMI: 61.36 kg/m^2 Allergies   - Aspirin.   - Sulfa. CONCLUSIONS Summary Left ventricle is normal in size. Global left ventricular systolic function is normal. Calculated ejection fraction 60% by Heart Model. Marked Leftward compression of inter-ventricular septum (\"D-sign\") indicating RV volume or pressure overload. Right ventricular function appears reduced.  Moderately dilated right ventricular cavity. Moderate to severe tricuspid regurgitation. Severe pulmonary hypertension. Estimated right ventricular systolic pressure is 43KVZH. Trivial pulmonic insufficiency. Signature ----------------------------------------------------------------------------  Electronically signed by Juliet Mina(Sonographer) on 03/22/2022  04:14 PM ---------------------------------------------------------------------------- ----------------------------------------------------------------------------  Electronically signed by Don Ellington(Interpreting physician) on 03/23/2022  11:48 AM ---------------------------------------------------------------------------- FINDINGS Left Atrium Left atrium is normal in size. Left Ventricle Left ventricle is normal in size. Global left ventricular systolic function is normal. Calculated ejection fraction 60% by Heart Model. Marked Leftward compression of inter-ventricular septum (\"D-sign\") indicating RV volume or pressure overload. Right Atrium Right atrium is normal in size. Right Ventricle Right ventricular function appears reduced. Moderately dilated right ventricular cavity. Mitral Valve Normal mitral valve structure and function. No mitral regurgitation. Aortic Valve Aortic valve is trileaflet and opens adequately. No aortic insufficiency. Tricuspid Valve No obvious valvular abnormality. Moderate to severe tricuspid regurgitation. Severe pulmonary hypertension. Estimated right ventricular systolic pressure is 43FHQA. Pulmonic Valve The pulmonic valve is normal in structure. Trivial pulmonic insufficiency. Pericardial Effusion No pericardial effusion seen. Miscellaneous E/E' average = 7.1. IVC normal diameter & inspiratory collapse indicating normal RA filling pressure .  M-mode / 2D Measurements & Calculations:   LVIDd:3.4 cm(3.7 - 5.6 cm)       Diastolic VNPDBC:41.3 ml  MIBOC:5.2 cm(2.2 - 4.0 cm)       Systolic PRNGWY:39.0 ml  CYVJ:1.1 cm(0.6 - 1.1 cm)        Aortic Root:2.8 cm(2.0 - 3.7 cm)  LVPWd:1 cm(0.6 - 1.1 cm)         LA Dimension: 2.4 cm(1.9 - 4.0 cm)  Fractional Shortenin.24 %    LA volume/Index: 47.93 ml /18m^2  Calculated LVEF (%): 60.84 %     LVOT:1.7 cm                                   RVDd:4.91 cm   Mitral:                                 Aortic   Valve Area (P1/2-Time): 3.86 cm^2       Peak Velocity: 1.66 m/s  Peak E-Wave: 0.75 m/s                   Mean Velocity: 1.04 m/s  Peak A-Wave: 0.60 m/s                   Peak Gradient: 11.02 mmHg  E/A Ratio: 1.25                         Mean Gradient: 5 mmHg  Peak Gradient: 2.25 mmHg  Mean Gradient: 2 mmHg  Deceleration Time: 195 msec             Area (continuity): 1.37 cm^2  P1/2t: 57 msec                          AV VTI: 29.9 cm   Area (continuity): 1.96 cm^2  Mean Velocity: 0.75 m/s   Tricuspid:                              Pulmonic:   Peak TR Velocity: 4.34 m/s              Peak Velocity: 1.22 m/s  Peak TR Gradient: 75.3424 mmHg          Peak Gradient: 5.95 mmHg  Diastology / Tissue Doppler Septal Wall E' velocity:0.10 m/s Septal Wall E/E':7.7 Lateral Wall E' velocity:0.11 m/s Lateral Wall E/E':6.6    XR ABDOMEN (KUB) (SINGLE AP VIEW)    Result Date: 2022  EXAMINATION: ONE SUPINE XRAY VIEW(S) OF THE ABDOMEN 2022 12:45 pm COMPARISON: CT dated 2019. HISTORY: ORDERING SYSTEM PROVIDED HISTORY: Constipation, right lower abdominal  pain TECHNOLOGIST PROVIDED HISTORY: Constipation, right lower abdominal  pain Reason for Exam: supine FINDINGS: Nonspecific bowel gas pattern is seen with gaseous distension of the stomach. Mild-to-moderate amount of stool is noted in the colon. Evaluation of free air is limited on this supine view. There appears to be Trujillo catheter in place. Nonspecific bowel gas pattern with mild-to-moderate amount of stool noted in the colon. Gaseous distention of the stomach.      XR CHEST PORTABLE    Result Date: 4/10/2022  EXAMINATION: ONE XRAY VIEW OF THE CHEST 4/10/2022 9:12 am COMPARISON: 04/08/2022 HISTORY: Reason for Exam: Acute hypoxia admitted with Pul edema FINDINGS: Stable cardiomegaly. Mild central vascular congestion. Interval increased right perihilar/infrahilar opacity. No sizable effusion or discernible pneumothorax. Osseous structures grossly intact. *Stable cardiomegaly with mild central vascular congestion. *Interval increased right perihilar/infrahilar opacity which may represent developing pneumonia. XR CHEST PORTABLE    Result Date: 4/8/2022  EXAMINATION: ONE XRAY VIEW OF THE CHEST 4/8/2022 4:37 pm COMPARISON: 03/22/2022 HISTORY: ORDERING SYSTEM PROVIDED HISTORY: Shortness of breath, COPD and CHF, recent admission for pneumonia TECHNOLOGIST PROVIDED HISTORY: Shortness of breath, COPD and CHF, recent admission for pneumonia Reason for Exam: upr,sob, FINDINGS: Examination is limited by the patient's body habitus and by portable technique. Cardial pericardial silhouette is prominent but stable. There are low lung volumes is secondary bronchovascular crowding. No focal infiltrate is identified. No pneumothorax. No free air. No acute bony abnormality. Limited negative portable chest radiograph. XR CHEST PORTABLE    Result Date: 3/22/2022  EXAMINATION: ONE XRAY VIEW OF THE CHEST 3/22/2022 9:30 am COMPARISON: 03/19/2022 HISTORY: ORDERING SYSTEM PROVIDED HISTORY: improvement in pnuemonia TECHNOLOGIST PROVIDED HISTORY: improvement in pnuemonia Reason for Exam: ap uprt port chest FINDINGS: As compared to prior examination, there has been significant improvement in the right lower lobe infiltrate. Lungs appear clear. There is cardiomegaly noted. Bony structures unremarkable. Improvement in the the right basal infiltrate. XR CHEST PORTABLE    Result Date: 3/19/2022  EXAMINATION: ONE XRAY VIEW OF THE CHEST 3/19/2022 12:51 am COMPARISON: CT PA performed approximately 10 hours earlier. Portable chest obtained approximately 12 hours earlier.  HISTORY: ORDERING SYSTEM PROVIDED HISTORY: Increasing O2 requirment TECHNOLOGIST PROVIDED HISTORY: Increasing O2 requirment Reason for Exam: shortness of breath, chest pain off and on   upright port FINDINGS: Mild cardiomegaly and normal pulmonary vasculature. Right worse than left bibasilar patchy airspace opacities which appear worse than exam 12 hours earlier. No pneumothorax or pleural effusion. Surrounding structures are unremarkable. Findings suggestive of worsening bibasilar pneumonia. XR CHEST PORTABLE    Result Date: 3/18/2022  EXAMINATION: ONE XRAY VIEW OF THE CHEST 3/18/2022 11:16 am COMPARISON: Chest x-ray dated 29 June 2021 HISTORY: ORDERING SYSTEM PROVIDED HISTORY: sob TECHNOLOGIST PROVIDED HISTORY: sob FINDINGS: Mild stable cardiomegaly with pulmonary vascular congestion. No pneumothorax or pleural effusion. Stable cardiomegaly with mild pulmonary vascular congestion. CT CHEST PULMONARY EMBOLISM W CONTRAST    Result Date: 3/18/2022  EXAMINATION: CTA OF THE CHEST 3/18/2022 1:23 pm TECHNIQUE: CTA of the chest was performed after the administration of intravenous contrast.  Multiplanar reformatted images are provided for review. MIP images are provided for review. Dose modulation, iterative reconstruction, and/or weight based adjustment of the mA/kV was utilized to reduce the radiation dose to as low as reasonably achievable. COMPARISON: None HISTORY: ORDERING SYSTEM PROVIDED HISTORY: sedentary lifestyle, leg swelling, increased O2 TECHNOLOGIST PROVIDED HISTORY: sedentary lifestyle, leg swelling, increased O2 Decision Support Exception - unselect if not a suspected or confirmed emergency medical condition->Emergency Medical Condition (MA) Reason for Exam: sedentary lifestyle, leg swelling, increased O2 FINDINGS: Pulmonary Arteries: Pulmonary arteries are adequately opacified for evaluation. No evidence of intraluminal filling defect to suggest pulmonary embolism.   Main pulmonary artery is normal in caliber. Mediastinum: No evidence of mediastinal lymphadenopathy. Small reactive lymph nodes seen in the mediastinum and hilar regions. The heart and pericardium demonstrate no acute abnormality. There is no acute abnormality of the thoracic aorta. Lungs/pleura: Patchy ground-glass opacity and increased markings seen in the lower lung fields bilaterally concerning for bibasilar infiltrate. No pleural effusion or pneumothorax. No pulmonary mass or nodule. Patchy ground-glass opacity in the upper lung fields bilaterally. Upper Abdomen: Limited images of the upper abdomen are unremarkable. Soft Tissues/Bones: No acute bone or soft tissue abnormality. Degenerative changes seen within the spine with no chest wall abnormality. No evidence of pulmonary embolism. There is patchy ill-defined opacification lower lung fields bilaterally suggesting infiltrate with ground-glass opacity concerning for pneumonia as well in the upper lung fields. Reactive adenopathy throughout the mediastinum and hilar regions. RECOMMENDATIONS: Unavailable     VL DUP LOWER EXTREMITY VENOUS BILATERAL    Result Date: 3/19/2022    [facility]  Vascular Lower Extremities DVT Study Procedure   Patient Name  CARMEN       Date of Study           03/18/2022                [address] [facility]   Date of Birth 1975  Gender                  Female   Age           55 year(s)  Race                    Black   Room Number   2008        Height:                 65.98 inch, 167.6 cm   Corporate ID  [id number]    Weight:                 380 pounds, 172.4 kg  #   Patient Acct  [de-identified]   BSA:        2.63 m^2    BMI:       61.36 kg/m^2  #   MR #          [id number]     Sonographer             Shabbir Turcios   Accession #   [id number]  Interpreting Physician  Caleb Middleton   Referring                 Referring Physician     Evelyn Oswald.  Luis Lowe DO  Nurse  Practitioner  Procedure Type of Study:   Veins: Lower Extremities DVT Study, Venous Scan Lower Bilateral.  Indications for Study:Leg Swelling and Shortness of breath. Patient Status:ER. Technical Quality:Limited visualization. Limitation reason:bedside exam , body habitus, deep vessels, edema. Conclusions   Summary   No evidence of superficial or deep venous thrombosis in both lower  extremities. Signature   ----------------------------------------------------------------  Electronically signed by NIKKI Olivares(Sonographer) on  03/18/2022 01:27 PM  ----------------------------------------------------------------   ----------------------------------------------------------------  Electronically signed by Berdie Feather Reyes,Arthur(Interpreting  physician) on 03/19/2022 07:28 AM  ----------------------------------------------------------------  Findings:   Right Impression:                    Left Impression:  The common femoral, femoral,         The common femoral, femoral,  popliteal and tibial veins           popliteal and tibial veins  demonstrate normal compressibility   demonstrate normal compressibility  and augmentation. and augmentation. Normal compressibility of the great  Normal compressibility of the great  saphenous vein. saphenous vein. Normal compressibility of the small  Normal compressibility of the small  saphenous vein. saphenous vein. Limited visualization of the         Limited visualization of the  posterior tibial and peroneal veins. posterior tibial and peroneal veins. Risk Factors History +-------------------------------------------+----------+-------------------+ ! Diagnosis                                  ! Date      ! Comments           ! +-------------------------------------------+----------+-------------------+ ! Previous Scan                              !04/04/2008! WNL                ! +-------------------------------------------+----------+-------------------+ ! Chronic lung disease->COPD                 ! !                   ! +-------------------------------------------+----------+-------------------+ ! Previous Scan                              !09/22/2014! Bilateral WNL      ! +-------------------------------------------+----------+-------------------+ ! CHF                                        ! !                   ! +-------------------------------------------+----------+-------------------+   - The patient's risk factor(s) include: chronic lung disease, dyslipidemia     and arterial hypertension.   - The patient has a former tobacco history. Allergies   - Allergy:Aspirin(Drug). - Allergy:Sulfa(Drug). Velocities are measured in cm/s ; Diameters are measured in cm Right Lower Extremities DVT Study Measurements Right 2D Measurements +------------------------------------+----------+---------------+----------+ ! Location                            ! Visualized! Compressibility! Thrombosis! +------------------------------------+----------+---------------+----------+ ! Common Femoral                      !Yes       ! Yes            ! None      ! +------------------------------------+----------+---------------+----------+ ! Prox Femoral                        !Yes       ! Yes            ! None      ! +------------------------------------+----------+---------------+----------+ ! Mid Femoral                         !Partial   !Yes            ! None      ! +------------------------------------+----------+---------------+----------+ ! Dist Femoral                        !Partial   !Yes            ! None      ! +------------------------------------+----------+---------------+----------+ ! Popliteal                           !Yes       ! Yes            ! None      ! +------------------------------------+----------+---------------+----------+ ! Sapheno Femoral Junction            ! Yes       ! Yes            ! None      ! +------------------------------------+----------+---------------+----------+ ! PTV !Partial   !Yes            ! None      ! +------------------------------------+----------+---------------+----------+ ! Peroneal                            !Partial   !Yes            ! None      ! +------------------------------------+----------+---------------+----------+ ! Gastroc                             ! Yes       ! Yes            ! None      ! +------------------------------------+----------+---------------+----------+ ! GSV Thigh                           ! Yes       ! Yes            ! None      ! +------------------------------------+----------+---------------+----------+ ! GSV Knee                            ! Yes       ! Yes            ! None      ! +------------------------------------+----------+---------------+----------+ ! GSV Ankle                           ! Yes       ! Yes            ! None      ! +------------------------------------+----------+---------------+----------+ ! SSV                                 ! Yes       ! Yes            ! None      ! +------------------------------------+----------+---------------+----------+ Right Doppler Measurements +--------------------------+---------+------+------------------------------+ ! Location                  ! Signal   !Reflux! Reflux (msec)                 ! +--------------------------+---------+------+------------------------------+ ! Common Femoral            !Pulsatile!      !                              ! +--------------------------+---------+------+------------------------------+ ! Prox Femoral              !Pulsatile!      !                              ! +--------------------------+---------+------+------------------------------+ ! Popliteal                 !Pulsatile!      !                              ! +--------------------------+---------+------+------------------------------+ Left Lower Extremities DVT Study Measurements Left 2D Measurements +------------------------------------+----------+---------------+----------+ ! Location !Visualized! Compressibility! Thrombosis! +------------------------------------+----------+---------------+----------+ ! Common Femoral                      !Yes       ! Yes            ! None      ! +------------------------------------+----------+---------------+----------+ ! Prox Femoral                        !Yes       ! Yes            ! None      ! +------------------------------------+----------+---------------+----------+ ! Mid Femoral                         !Partial   !Yes            ! None      ! +------------------------------------+----------+---------------+----------+ ! Dist Femoral                        !Partial   !Yes            ! None      ! +------------------------------------+----------+---------------+----------+ ! Popliteal                           !Yes       ! Yes            ! None      ! +------------------------------------+----------+---------------+----------+ ! Sapheno Femoral Junction            ! Yes       ! Yes            ! None      ! +------------------------------------+----------+---------------+----------+ ! PTV                                 ! Partial   !Yes            ! None      ! +------------------------------------+----------+---------------+----------+ ! Peroneal                            !Partial   !Yes            ! None      ! +------------------------------------+----------+---------------+----------+ ! Gastroc                             ! Yes       ! Yes            ! None      ! +------------------------------------+----------+---------------+----------+ ! GSV Thigh                           ! Yes       ! Yes            ! None      ! +------------------------------------+----------+---------------+----------+ ! GSV Knee                            ! Yes       ! Yes            ! None      ! +------------------------------------+----------+---------------+----------+ ! GSV Ankle                           ! Yes       ! Yes            ! None      ! +------------------------------------+----------+---------------+----------+ ! SSV                                 ! Yes       ! Yes            ! None      ! +------------------------------------+----------+---------------+----------+ Left Doppler Measurements +--------------------------+---------+------+------------------------------+ ! Location                  ! Signal   !Reflux! Reflux (msec)                 ! +--------------------------+---------+------+------------------------------+ ! Common Femoral            !Pulsatile!      !                              ! +--------------------------+---------+------+------------------------------+ ! Prox Femoral              !Pulsatile!      !                              ! +--------------------------+---------+------+------------------------------+ ! Popliteal                 !Pulsatile!      !                              ! +--------------------------+---------+------+------------------------------+    FL MODIFIED BARIUM SWALLOW W VIDEO    Result Date: 3/19/2022  EXAMINATION: MODIFIED BARIUM SWALLOW WAS PERFORMED IN CONJUNCTION WITH SPEECH PATHOLOGY SERVICES TECHNIQUE: Fluoroscopic evaluation of the swallowing mechanism was performed using cineradiography with multiple consistency of barium product in conjunction with speech pathology services. FLUOROSCOPY DOSE AND TYPE OR TIME AND EXPOSURES: Fluoro time: 1.1 minute DAP: 22.570 mGy COMPARISON: None HISTORY: ORDERING SYSTEM PROVIDED HISTORY: h/o esophageal stricture per patient TECHNOLOGIST PROVIDED HISTORY: h/o esophageal stricture per patient 59-year-old female with history of esophageal stricture FINDINGS: No penetration or aspiration with the thin liquid and thick liquid substance by straw, pureed/pudding thick, soft solid and cookie solid substances. No penetration or aspiration with the above administered substances. Please see separate speech pathology report for full discussion of findings and recommendations. ASSESSMENT & PLAN     ASSESSMENT / PLAN:     Principal Problem:    CHF (congestive heart failure), NYHA class I, acute on chronic, combined ([facility]  Active Problems:    Asthma    HTN (hypertension)    Acute respiratory failure with hypoxia and hypercapnia (HCC)    Morbid obesity with BMI of 50.0-59.9, adult (HCC)    Obesity hypoventilation syndrome (HCC)    STEPH (obstructive sleep apnea)    Pulmonary hypertension (HCC)    Normocytic anemia    Prediabetes    Hyperlipidemia  Resolved Problems:    * No resolved hospital problems. *      Acute on chronic hypoxic hypercapnic respiratory failure likely secondary to pulmonary edema with underlying  chronic congestive heart failure  - Echo from 3/22/2022 shows EF 60 %  moderately dilated right ventricle. Moderate to severe tricuspid regurgitation.    -Continue diuresis with IV  furosemide 40 mg IV twice daily and it is to be continued on discharge per Dr. Sheldon Goodwin.  -Continue diuresis with IV  furosemide 40 mg IV twice daily and it is to be continued on discharge per Dr. Sheldon Goodwin. Severe pulmonary hypertension with estimated right ventricular systolic pressure 80 mmHg. Continue amlodipine 5 mg daily. History of asthma - Pulmonology on board. Recommend to continue on bronchodilators-albuterol, Spiriva and LABA plus ICS    STEPH/OHS- bipap nightly    Essential HTN  Continue home med Norvasc 5 mg oral daily. .    Anemia of chronic disease Ferritin elevated at 327. iron 36, TIBC 239, iron saturation 15. Hemoglobin 8.7 and stable. Monitor H&H to monitor for any signs of bleeding. Prediabetes  a1c 6.4  Med dose sliding scale  Insulin Lantus 10-->20 U SC nightly  POCT glucose 4xdaily. Hyperlipidemia  Continue Lipitor 40 mg oral nightly. Normocytic anemia  Continue folic acid and ferrous sulfate tablets. DVT ppx: heparin  GI ppx: protonix   Diet: Regular    Patient has been seen by palliative care on 4/12/2022.   Patient and family want everything to be done to the patient. CODE STATUS is full code. PT/OT/SW : On board. Discharge Planning:  Discharge to [facility].  assisting with transitional planning. Miller Mejia MD  Internal Medicine Resident, PGY-1  [id number] [facility]  4/16/2022, 7:17 AM        Attending Physician Statement  I have discussed the care of [address] [place] [address] and I have examined the patient myselft and taken ros and hpi , including pertinent history and exam findings,  with the resident. I have reviewed the key elements of all parts of the encounter with the resident. I agree with the assessment, plan and orders as documented by the resident.   AC on ch hypoxic resp failure , on high flow 55%, 30L oxygen  STEPH/OHS  COPD   Severe Pulm HTN   MO   PNA,   Anemia of CD   Ac CHF   Inhalers Symbicort/spiriva, alb prn   Trilogy advised, insurance to approve   Palliative care consult appreciated   Overall prognosis very poor  LTACH placement   working with PT/OT       Electronically signed by Carmen Oconnor MD

## 2022-04-16 NOTE — PROGRESS NOTES
Pulmonary, Critical Care and Sleep Medicine   Negar Smith, MPH MD [facility] covering physician service    [address] Problems    Diagnosis Date Noted    CHF (congestive heart failure), NYHA class I, acute on chronic, combined ([facility] [I50.43] 2022    Prediabetes [R73.03] 2022    Hyperlipidemia [E78.5] 2022    Normocytic anemia [D64.9] 2019    Pulmonary hypertension ([facility] [I27.20] 2018    Morbid obesity with BMI of 50.0-59.9, adult ([facility] [E66.01, Z68.43]     Obesity hypoventilation syndrome ([facility] [E66.2]     STEPH (obstructive sleep apnea) [G47.33]     Acute respiratory failure with hypoxia and hypercapnia (HCC) [J96.01, J96.02] 2018    HTN (hypertension) [I10] 2014    Asthma [J45.909] 2014     Assessment and plan   Chronic respiratory acidosis  Severe pulmonary hypertension group 2 and group 3  STEPH OHS  Chronic obstructive pulmonary disease chronic home oxygen  Morbid obesity  Acute on chronic congestive heart failure  Principal Problem:    CHF (congestive heart failure), NYHA class I, acute on chronic, combined ([facility]  Active Problems:    Asthma    HTN (hypertension)    Acute respiratory failure with hypoxia and hypercapnia (HCC)    Morbid obesity with BMI of 50.0-59.9, adult (HCC)    Obesity hypoventilation syndrome (HCC)    STEPH (obstructive sleep apnea)    Pulmonary hypertension (HCC)    Normocytic anemia    Prediabetes    Hyperlipidemia    Continue to wean high flow - keep sats 88-92 %. Currently on 25L/ min, 55%  bipap at night and prn during day   OOB as much as possible. Continue bronchodilator therapy  Responded well to lasix 40 mg iv bid - continue for now . Dc planning   Ok to dc to ltach when arranged     SUPPORTING DATA     I saw patient with RN  I reviewed Epic, VSS, labs, notes.   Patient - Bolivar Joel,  Age - 55 y.o.    - 1975      Room Number -    N -  [id number]   Acct # - [id number]  Date of Admission -  4/8/2022  Hospital Day - 8  Admission history, hospital course to date   Maryellen Dunn is a 55y.o. 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 Patient was discharged home recently on supplemental oxygen and insurance had not approved trilogy so she was sent home with her home CPAP. Patient was brought back to the hospital because she was having worsening shortness of breath. Unable to use her CPAP because of fatigue. On arrival EMS found that her saturation was in mid [de-identified]. She was placed on nonrebreather brought to the ER. Pulmonary has been consulted because of high flow requirement and hypoxia. Patient is laying in bed, she is on high flow oxygen 80%, using BiPAP at night. Her BNP was elevated. She is under going diuresis,   On Symbicort, Spiriva and albuterol. Past 24 hours   Remains on HFNC, NIV at nite  Getting out of bed  no acute events reported in last 24 hours  No tests or procedures in last 24h. All other systems reviewed   Objective    Vitals   height is 5' 6\" (1.676 m) and weight is 343 lb (155.6 kg) (abnormal). Her oral temperature is 98.2 °F (36.8 °C). Her blood pressure is 113/69 and her pulse is 91. Her respiration is 20 and oxygen saturation is 90%. Body mass index is 55.36 kg/m². O2 Flow Rate (L/min): (S) 25 L/min (increased per patient stating \"feeling short of breath\")  I/O    Intake/Output Summary (Last 24 hours) at 4/16/2022 1028  Last data filed at 4/16/2022 0646  Gross per 24 hour   Intake 720 ml   Output 2400 ml   Net -1680 ml     I/O last 3 completed shifts:   In: 1200 [P.O.:1200]  Out: 3300 [Urine:3300]   Patient Vitals for the past 96 hrs (Last 3 readings):   Weight   04/16/22 0600 (!) 343 lb (155.6 kg)   04/13/22 0535 (!) 342 lb 9.5 oz (155.4 kg)     Exam    General Appearance - awake, alert, oriented, in no acute distress  HEENT - normal  Neck - supple, symmetrical, trachea midline   Lungs - INAD, good chest movement  Cardiovascular - regular rhythm   Abdomen - obese, nontender, nondistended, no masses or organomegaly  Neurologic - no focal motor or sensory deficits, GCS 15  Skin - no bruising or bleeding  Extremities - no cyanosis, clubbing or edema    Meds       furosemide  40 mg IntraVENous BID    lidocaine  1 patch TransDERmal Daily    insulin glargine  20 Units SubCUTAneous Nightly    insulin lispro  0-18 Units SubCUTAneous TID WC    insulin lispro  0-9 Units SubCUTAneous Nightly    sertraline  100 mg Oral Daily    albuterol  2.5 mg Nebulization 4x daily    levofloxacin  750 mg IntraVENous Q24H    amLODIPine  5 mg Oral Daily    atorvastatin  40 mg Oral Nightly    budesonide-formoterol  2 puff Inhalation BID    ferrous sulfate  325 mg Oral BID WC    folic acid  1 mg Oral Daily    hydrALAZINE  25 mg Oral 3 times per day    isosorbide dinitrate  20 mg Oral TID    pantoprazole  40 mg Oral QAM AC    spironolactone  25 mg Oral Daily    tiotropium  2 puff Inhalation Daily    vitamin B-12  1,000 mcg Oral Daily    sodium chloride flush  10 mL IntraVENous 2 times per day    heparin (porcine)  5,000 Units SubCUTAneous 3 times per day    gabapentin  300 mg Oral TID      sodium chloride Stopped (04/09/22 0636)    dextrose       oxyCODONE-acetaminophen, oxyCODONE, acetaminophen, sodium chloride, albuterol sulfate HFA, diazePAM, sodium chloride flush, sodium chloride, magnesium sulfate, ondansetron **OR** ondansetron, polyethylene glycol, glucose, dextrose, glucagon (rDNA), dextrose, albuterol    Data   I reviewed lab and radiology data.   CBC  Recent Labs     04/14/22  0536 04/15/22  0559 04/16/22  0630   WBC 10.9 10.5 8.7   RBC 3.61* 3.84* 4.01   HGB 9.4* 10.0* 10.4*   HCT 32.0* 34.0* 35.6*   MCV 88.6 88.5 88.8   MCH 26.0 26.0 25.9   MCHC 29.4 29.4 29.2   RDW 16.2* 16.4* 16.4*    251 257   MPV 10.6 10.5 10.7        BMP  Recent Labs 04/14/22  0536 04/15/22  0559 04/16/22  0630    140 137   K 4.0 3.7 3.6*   CL 87* 85* 88*   CO2 40* 42* 41*   BUN 34* 33* 28*   CREATININE 1.11* 1.16* 1.05*   GLUCOSE 216* 153* 125*   CALCIUM 9.7 9.9 9.7

## 2022-04-16 NOTE — CARE COORDINATION
Transitional Planning    Called Ryan Campbell at [place] [phone] and left VM requesting return phone call if there are any updates on pre cert. 1000 VM from [employer] that pre cert is still pending. HE will call if pre cert approved.

## 2022-04-17 PROBLEM — I27.81 COR PULMONALE, CHRONIC (HCC): Status: ACTIVE | Noted: 2022-01-01

## 2022-04-17 NOTE — PLAN OF CARE
Problem: Skin Integrity:  Goal: Will show no infection signs and symptoms  Description: Will show no infection signs and symptoms  4/17/2022 0957 by Kacie Jose RCP  Outcome: Ongoing  4/16/2022 2349 by Aaliyah Hernandez RN  Outcome: Ongoing  Goal: Absence of new skin breakdown  Description: Absence of new skin breakdown  4/17/2022 0957 by Kacie Jose RCP  Outcome: Ongoing  4/16/2022 2349 by Aaliyah Hernandez RN  Outcome: Ongoing     PROVIDE ADEQUATE OXYGENATION WITH ACCEPTABLE SP02/ABG'S     [x]         IDENTIFY APPROPRIATE OXYGEN THERAPY  [x]         MONITOR SP02/ABG'S AS NEEDED   [x]         PATIENT EDUCATION AS NEEDED     NON INVASIVE VENTILATION  PROVIDE OPTIMAL VENTILATION/ACCEPTABLE SP02  IMPLEMENT NON INVASIVE VENTILATION PROTOCOL  ASSESSMENT SKIN INTEGRITY  PATIENT EDUCATION AS NEEDED  BIPAP AS NEEDED

## 2022-04-17 NOTE — PROGRESS NOTES
Pulmonary, Critical Care and Sleep Medicine   Amauri Yoon, MPH MD [facility] covering physician service    [address] Problems    Diagnosis Date Noted    CHF (congestive heart failure), NYHA class I, acute on chronic, combined ([facility] [I50.43] 04/08/2022    Prediabetes [R73.03] 03/19/2022    Hyperlipidemia [E78.5] 03/19/2022    Normocytic anemia [D64.9] 05/06/2019    Pulmonary hypertension ([address] [I27.20] 11/16/2018    Morbid obesity with BMI of 50.0-59.9, adult ([facility] [E66.01, Z68.43]     Obesity hypoventilation syndrome ([facility] [facility] [E66.2]     STEPH (obstructive sleep apnea) [G47.33]     Acute respiratory failure with hypoxia and hypercapnia (HCC) [J96.01, J96.02] 01/27/2018    HTN (hypertension) [I10] 09/21/2014    Asthma [J45.909] 09/21/2014     Assessment and plan   Chronic respiratory acidosis  Severe pulmonary hypertension group 2 and group 3  STEPH OHS  Chronic obstructive pulmonary disease chronic home oxygen  Morbid obesity  Acute on chronic congestive heart failure  Principal Problem:    CHF (congestive heart failure), NYHA class I, acute on chronic, combined ([address]  Active Problems:    Asthma    HTN (hypertension)    Acute respiratory failure with hypoxia and hypercapnia (HCC)    Morbid obesity with BMI of 50.0-59.9, adult (HCC)    Obesity hypoventilation syndrome (HCC)    STEPH (obstructive sleep apnea)    Pulmonary hypertension (HCC)    Normocytic anemia    Prediabetes    Hyperlipidemia    Primary complaint is GI: defer to primary service, recommend consider probiotics now that antibiotics are done. Continue to wean high flow - keep sats 88-92 %. Currently on 25L/ min, 50%  bipap at night and prn during day   OOB as much as possible. Continue bronchodilator therapy  Responded well to lasix 40 mg iv bid - continue for now . Dc planning   Ok to dc to ltach when arranged     SUPPORTING DATA     I saw patient with RN  I reviewed Epic, VSS, labs, notes.   Patient - Gilda Scruggs,  Age - 55 y.o.    - 1975      Room Number -    MRN -  [id number]   Acct # - [de-identified]  Date of Admission -  2022  Hospital Day - 9  Admission history, hospital course to date   Gilda Scruggs is a 55y.o. 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 Patient was discharged home recently on supplemental oxygen and insurance had not approved trilogy so she was sent home with her home CPAP. Patient was brought back to the hospital because she was having worsening shortness of breath. Unable to use her CPAP because of fatigue. On arrival EMS found that her saturation was in mid [de-identified]. She was placed on nonrebreather brought to the ER. Pulmonary has been consulted because of high flow requirement and hypoxia. Patient is laying in bed, she is on high flow oxygen 80%, using BiPAP at night. Her BNP was elevated. She is under going diuresis,   On Symbicort, Spiriva and albuterol. Past 24 hours   Remains on HFNC, NIV at nite  Getting out of bed  Complains of n/v, loose stools. Denies dyspnea. no acute events reported in last 24 hours  No tests or procedures in last 24h. All other systems reviewed   Objective    Vitals   height is 5' 6\" (1.676 m) and weight is 343 lb (155.6 kg) (abnormal). Her oral temperature is 98.2 °F (36.8 °C). Her blood pressure is 104/93 (abnormal) and her pulse is 89. Her respiration is 19 and oxygen saturation is 93%. Body mass index is 55.36 kg/m².   O2 Flow Rate (L/min): 25 L/min  I/O    Intake/Output Summary (Last 24 hours) at 2022 1108  Last data filed at 2022 1045  Gross per 24 hour   Intake    Output 3075 ml   Net -3075 ml     I/O last 3 completed shifts:  In: -   Out: 4756 [Urine:3225]   Patient Vitals for the past 96 hrs (Last 3 readings):   Weight   22 0600 (!) 343 lb (155.6 kg)     Exam    General Appearance - awake, alert, oriented, in no acute distress  HEENT - normal  Neck - supple, symmetrical, trachea midline   Lungs - INAD, good chest movement  Cardiovascular - regular rhythm   Abdomen - obese, nontender, nondistended, no masses or organomegaly  Neurologic - no focal motor or sensory deficits, GCS 15  Skin - no bruising or bleeding  Extremities - no cyanosis, clubbing or edema    Meds       [START ON 4/18/2022] lactobacillus  1 capsule Oral Daily with breakfast    furosemide  40 mg IntraVENous BID    lidocaine  1 patch TransDERmal Daily    insulin glargine  20 Units SubCUTAneous Nightly    insulin lispro  0-18 Units SubCUTAneous TID WC    insulin lispro  0-9 Units SubCUTAneous Nightly    sertraline  100 mg Oral Daily    albuterol  2.5 mg Nebulization 4x daily    amLODIPine  5 mg Oral Daily    atorvastatin  40 mg Oral Nightly    budesonide-formoterol  2 puff Inhalation BID    ferrous sulfate  325 mg Oral BID WC    folic acid  1 mg Oral Daily    hydrALAZINE  25 mg Oral 3 times per day    isosorbide dinitrate  20 mg Oral TID    pantoprazole  40 mg Oral QAM AC    spironolactone  25 mg Oral Daily    tiotropium  2 puff Inhalation Daily    vitamin B-12  1,000 mcg Oral Daily    sodium chloride flush  10 mL IntraVENous 2 times per day    heparin (porcine)  5,000 Units SubCUTAneous 3 times per day    gabapentin  300 mg Oral TID      sodium chloride Stopped (04/09/22 0636)    dextrose       calcium carbonate, oxyCODONE-acetaminophen, oxyCODONE, acetaminophen, sodium chloride, albuterol sulfate HFA, diazePAM, sodium chloride flush, sodium chloride, magnesium sulfate, ondansetron **OR** ondansetron, polyethylene glycol, glucose, dextrose, glucagon (rDNA), dextrose, albuterol    Data   I reviewed lab and radiology data.   CBC  Recent Labs     04/15/22  0559 04/16/22  0630   WBC 10.5 8.7   RBC 3.84* 4.01   HGB 10.0* 10.4*   HCT 34.0* 35.6*   MCV 88.5 88.8   MCH 26.0 25.9   MCHC 29.4 29.2   RDW 16.4* 16.4*    257   MPV 10.5 10.7        BMP  Recent Labs     04/15/22  0559 04/16/22  0630    137   K 3.7 3.6*   CL 85* 88*   CO2 42* 41*   BUN 33* 28*   CREATININE 1.16* 1.05*   GLUCOSE 153* 125*   CALCIUM 9.9 9.7

## 2022-04-17 NOTE — CARE COORDINATION
Transitional Planning    Updated patient that CM is waiting for insurance approval for patient to go to [facility], [facility]

## 2022-04-17 NOTE — PLAN OF CARE
Problem: Falls - Risk of:  Goal: Will remain free from falls  Description: Will remain free from falls  Outcome: Ongoing  Goal: Absence of physical injury  Description: Absence of physical injury  Outcome: Ongoing     Problem: Skin Integrity:  Goal: Will show no infection signs and symptoms  Description: Will show no infection signs and symptoms  Outcome: Ongoing  Goal: Absence of new skin breakdown  Description: Absence of new skin breakdown  Outcome: Ongoing     Problem: Nutrition  Goal: Optimal nutrition therapy  Outcome: Ongoing     Problem: Pain:  Goal: Pain level will decrease  Description: Pain level will decrease  Outcome: Ongoing  Goal: Control of acute pain  Description: Control of acute pain  Outcome: Ongoing  Goal: Control of chronic pain  Description: Control of chronic pain  Outcome: Ongoing     Problem: Discharge Planning:  Goal: Discharged to appropriate level of care  Description: Discharged to appropriate level of care  Outcome: Ongoing

## 2022-04-17 NOTE — PROGRESS NOTES
[facility]  Internal Medicine Teaching Residency Program  Inpatient Daily Progress Note  ______________________________________________________________________________    Patient: Alberto Shane  YOB: 1975   LH    Acct: [de-identified]     Room:   Admit date: 2022  Today's date: 22  Number of days in the hospital: 9    SUBJECTIVE   Admitting Diagnosis: CHF (congestive heart failure), NYHA class I, acute on chronic, combined ([facility] [address]  CC: SOB  Pt examined at bedside. Chart & results reviewed. No acute events overnight. Vitals and labs reviewed  Patient continues to be on high flow 25 L 55% FiO2 alternating with BiPAP    working on [employer] for Done In :60 Seconds placement. ROS:  Constitutional:  negative for chills, fevers, sweats  Respiratory:  negative for cough, wheezing, hemoptysis  Cardiovascular: lower extremity edema, palpitations  Gastrointestinal:  negative for abdominal pain, constipation, diarrhea, nausea, vomiting  Neurological:  negative for dizziness, headache    BRIEF HISTORY     The patient is a pleasant 55 y.o. 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 Patient states she has had increased shortness of breath the past 2 nights, unable to wear her CPAP nightly with increased fatigue. Patient is poor historian, states she wears 7 L O2 at home, however EMS on arrival saw patient on 4 L and was saturating in the mid 80s. Patient was placed on nonrebreather by EMS and saturations improved to 98%.     Of note, patient had previous hospitalization earlier this month for COPD exacerbation complicated by pneumonia. At that time patient was treated with antibiotics and steroids. Pulmonology was consulted and patient was approved for trilogy noninvasive ventilator which she has yet to receive.   Echocardiogram at that time showed EF 60%, \"D\" sign indicating RV pressure overload, moderate to severe MR, severe pulmonary hypertension. Patient is aware of these findings.     On my evaluation, patient resting comfortably on 15 L nonrebreather mask acute distress. Patient is morbidly obese with BMI 57. Denies any cough, chest pain, leg pain/swelling. She does have skin irritation on her buttocks. States she has been compliant with all meds (inculding bronchodilators and bumex 2mg BID) except for her PO DM meds as those were held on previous admission. Afebrile, hemodynamic stable, tachycardic in the 110s at present. Patient received prednisone and lasix 40mg IV in ED as well as rocephin and vancomycin. CXR in ED - limited negative CXR d/t body habitus.     Significant labs include proBNP on admission 4192, was in 1000s on recent discharge. Leukocytosis of 17.0. Hemoglobin 9.7. Slight bump in creatinine from baseline- 1.21. glucose 238.       4/10/2022-episode of hypoxia, patient drank 3.5 L of fluid admitted with CHF exacerbation, getting a chest x-ray, IV diuretics to continue, labs de-escalate, prednisone discontinued. 2022 - Patient has drank 2.5 L in the last 24 hours. Admitted with CHF exacerbation, currently on IV Lasix 80 mg IV twice daily. Did receive this morning. We will get a chest x-ray to look for any worsening pulmonary edema. Mild edema bilateral lower extremities noted. Labs Descalated, prednisone discontinued    2022 - Overnight BG went up to 423 and then insulin Lantus was increased to 20 nightly.     2022 -Lasix changed from 80 mg to 40 mg IV twice daily       OBJECTIVE     Vital Signs:  /63   Pulse 91   Temp 98.2 °F (36.8 °C) (Tympanic)   Resp 19   Ht 5' 6\" (1.676 m)   Wt (!) 343 lb (155.6 kg)   SpO2 92%   BMI 55.36 kg/m²     Temp (24hrs), Av.3 °F (36.8 °C), Min:98.1 °F (36.7 °C), Max:98.7 °F (37.1 °C)    In: -   Out: 4195 [Urine:3075]    Physical Exam:  Constitutional: This is a well developed, well nourished, Greater than 36 - Morbid Obesity / Extreme Obesity / Grade III 55y.o. year old female who is alert, oriented, cooperative and in no apparent distress. Head:normocephalic and atraumatic. On high flow nasal cannula  EENT:  PERRLA. No conjunctival injections. Septum was midline, mucosa was without erythema, exudates or cobblestoning. No thrush was noted. Neck: Supple without thyromegaly. No elevated JVP. Trachea was midline. Respiratory: Breath sounds clear to auscultation bilaterally. No wheezes, crackles or rhonchi. Cardiovascular: Regular without murmur, clicks, gallops or rubs. Abdomen: Slightly rounded and soft without organomegaly. No rebound, rigidity or guarding was appreciated. Lymphatic: No lymphadenopathy. Musculoskeletal: Normal curvature of the spine. No gross muscle weakness. Extremities: Chronic bilateral lower extremity edema  Skin:  Warm and dry. Good color, turgor and pigmentation. No lesions or scars.   No cyanosis or clubbing  Neurological/Psychiatric: The patient's general behavior, level of consciousness, thought content and emotional status is normal.        Medications:  Scheduled Medications:    [START ON 4/18/2022] lactobacillus  1 capsule Oral Daily with breakfast    furosemide  40 mg IntraVENous BID    lidocaine  1 patch TransDERmal Daily    insulin glargine  20 Units SubCUTAneous Nightly    insulin lispro  0-18 Units SubCUTAneous TID WC    insulin lispro  0-9 Units SubCUTAneous Nightly    sertraline  100 mg Oral Daily    albuterol  2.5 mg Nebulization 4x daily    amLODIPine  5 mg Oral Daily    atorvastatin  40 mg Oral Nightly    budesonide-formoterol  2 puff Inhalation BID    ferrous sulfate  325 mg Oral BID WC    folic acid  1 mg Oral Daily    hydrALAZINE  25 mg Oral 3 times per day    isosorbide dinitrate  20 mg Oral TID    pantoprazole  40 mg Oral QAM AC    spironolactone  25 mg Oral Daily    tiotropium  2 puff Inhalation Daily    vitamin B-12  1,000 mcg Oral Daily    sodium chloride flush  10 mL IntraVENous 2 times per day    heparin (porcine)  5,000 Units SubCUTAneous 3 times per day    gabapentin  300 mg Oral TID     Continuous Infusions:    sodium chloride Stopped (04/09/22 0636)    dextrose       PRN Medicationscalcium carbonate, 500 mg, TID PRN  oxyCODONE-acetaminophen, 1 tablet, Q6H PRN  oxyCODONE, 5 mg, Q4H PRN  acetaminophen, 650 mg, Q4H PRN  sodium chloride, 1 spray, PRN  albuterol sulfate HFA, 2 puff, Q6H PRN  diazePAM, 5 mg, Q12H PRN  sodium chloride flush, 10 mL, PRN  sodium chloride, , PRN  magnesium sulfate, 1,000 mg, PRN  ondansetron, 4 mg, Q8H PRN   Or  ondansetron, 4 mg, Q6H PRN  polyethylene glycol, 17 g, Daily PRN  glucose, 15 g, PRN  dextrose, 12.5 g, PRN  glucagon (rDNA), 1 mg, PRN  dextrose, 100 mL/hr, PRN  albuterol, 2.5 mg, As Directed RT PRN        Diagnostic Labs:  CBC:   Recent Labs     04/15/22  0559 04/16/22  0630   WBC 10.5 8.7   RBC 3.84* 4.01   HGB 10.0* 10.4*   HCT 34.0* 35.6*   MCV 88.5 88.8   RDW 16.4* 16.4*    257     BMP:   Recent Labs     04/15/22  0559 04/16/22  0630    137   K 3.7 3.6*   CL 85* 88*   CO2 42* 41*   BUN 33* 28*   CREATININE 1.16* 1.05*     BNP: No results for input(s): BNP in the last 72 hours. PT/INR: No results for input(s): PROTIME, INR in the last 72 hours. APTT: No results for input(s): APTT in the last 72 hours. CARDIAC ENZYMES: No results for input(s): CKMB, CKMBINDEX, TROPONINI in the last 72 hours. Invalid input(s): CKTOTAL;3  FASTING LIPID PANEL:  Lab Results   Component Value Date    CHOL 144 11/17/2018    HDL 42 10/07/2020    TRIG 68 11/17/2018     LIVER PROFILE:   No results for input(s): AST, ALT, ALB, BILIDIR, BILITOT, ALKPHOS in the last 72 hours.    MICROBIOLOGY:   Lab Results   Component Value Date/Time    CULTURE NO GROWTH 5 DAYS 04/09/2022 02:22 AM       Imaging:    XR CHEST PORTABLE    Result Date: 4/8/2022  Limited negative portable chest radiograph. ECHO Complete 2D W Doppler W Color    Result Date: 3/22/2022  Transthoracic Echocardiography Report (TTE)  Patient Name PRICE       Date of Study               03/22/2022               Leopold Kitty   Date of      1975  Gender                      Female  Birth   Age          55 year(s)  Race                        Black   Room Number  2008        Height:                     65.98 inch, 167.6 cm   Corporate ID [id number]    Weight:                     380 pounds, 172.4 kg  #   Patient Acct [de-identified]   BSA:          2.63 m^2      BMI:      61.36  #                                                              kg/m^2   MR #         [id number]     Freddie Barrera   Accession #  [id number]  Interpreting Physician      [address] [place] [address]   Fellow                   Referring Nurse                           Practitioner   Interpreting             Referring Physician         Akira Perez  Fellow  Additional Comments Technically difficult study. Type of Study   TTE procedure:2D Echocardiogram, M-Mode, Doppler, Color Doppler. Procedure Date Date: 03/22/2022 Start: 03:37 PM Study Location: [facility] Technical Quality: Adequate visualization Indications:Dyspnea/SOB. History / Tech. Comments: Echo done at patient bedside. Procedure explained to patient. HTN, COPD, PHTN, STEPH, HX NSTEMI Patient Status: Inpatient Height: 65.98 inches Weight: 380 pounds BSA: 2.63 m^2 BMI: 61.36 kg/m^2 Allergies   - Aspirin.   - Sulfa. CONCLUSIONS Summary Left ventricle is normal in size. Global left ventricular systolic function is normal. Calculated ejection fraction 60% by Heart Model. Marked Leftward compression of inter-ventricular septum (\"D-sign\") indicating RV volume or pressure overload. Right ventricular function appears reduced. Moderately dilated right ventricular cavity. Moderate to severe tricuspid regurgitation. Severe pulmonary hypertension.  Estimated right ventricular systolic pressure is 05QPRW. Trivial pulmonic insufficiency. Signature ----------------------------------------------------------------------------  Electronically signed by Juliet Kong(Sonographer) on 2022  04:14 PM ---------------------------------------------------------------------------- ----------------------------------------------------------------------------  Electronically signed by Don Ellington(Interpreting physician) on 2022  11:48 AM ---------------------------------------------------------------------------- FINDINGS Left Atrium Left atrium is normal in size. Left Ventricle Left ventricle is normal in size. Global left ventricular systolic function is normal. Calculated ejection fraction 60% by Heart Model. Marked Leftward compression of inter-ventricular septum (\"D-sign\") indicating RV volume or pressure overload. Right Atrium Right atrium is normal in size. Right Ventricle Right ventricular function appears reduced. Moderately dilated right ventricular cavity. Mitral Valve Normal mitral valve structure and function. No mitral regurgitation. Aortic Valve Aortic valve is trileaflet and opens adequately. No aortic insufficiency. Tricuspid Valve No obvious valvular abnormality. Moderate to severe tricuspid regurgitation. Severe pulmonary hypertension. Estimated right ventricular systolic pressure is 83DJBJ. Pulmonic Valve The pulmonic valve is normal in structure. Trivial pulmonic insufficiency. Pericardial Effusion No pericardial effusion seen. Miscellaneous E/E' average = 7.1. IVC normal diameter & inspiratory collapse indicating normal RA filling pressure .  M-mode / 2D Measurements & Calculations:   LVIDd:3.4 cm(3.7 - 5.6 cm)       Diastolic EDIZUR:43.1 ml  KOLAF:9.8 cm(2.2 - 4.0 cm)       Systolic UAYDJW:43.5 ml  RRIW:3.9 cm(0.6 - 1.1 cm)        Aortic Root:2.8 cm(2.0 - 3.7 cm)  LVPWd:1 cm(0.6 - 1.1 cm)         LA Dimension: 2.4 cm(1.9 - 4.0 cm)  Fractional Shortenin.24 %    LA volume/Index: 47.93 ml /18m^2  Calculated LVEF (%): 60.84 %     LVOT:1.7 cm                                   RVDd:4.91 cm   Mitral:                                 Aortic   Valve Area (P1/2-Time): 3.86 cm^2       Peak Velocity: 1.66 m/s  Peak E-Wave: 0.75 m/s                   Mean Velocity: 1.04 m/s  Peak A-Wave: 0.60 m/s                   Peak Gradient: 11.02 mmHg  E/A Ratio: 1.25                         Mean Gradient: 5 mmHg  Peak Gradient: 2.25 mmHg  Mean Gradient: 2 mmHg  Deceleration Time: 195 msec             Area (continuity): 1.37 cm^2  P1/2t: 57 msec                          AV VTI: 29.9 cm   Area (continuity): 1.96 cm^2  Mean Velocity: 0.75 m/s   Tricuspid:                              Pulmonic:   Peak TR Velocity: 4.34 m/s              Peak Velocity: 1.22 m/s  Peak TR Gradient: 75.3424 mmHg          Peak Gradient: 5.95 mmHg  Diastology / Tissue Doppler Septal Wall E' velocity:0.10 m/s Septal Wall E/E':7.7 Lateral Wall E' velocity:0.11 m/s Lateral Wall E/E':6.6    XR ABDOMEN (KUB) (SINGLE AP VIEW)    Result Date: 4/1/2022  EXAMINATION: ONE SUPINE XRAY VIEW(S) OF THE ABDOMEN 4/1/2022 12:45 pm COMPARISON: CT dated 05/08/2019. HISTORY: ORDERING SYSTEM PROVIDED HISTORY: Constipation, right lower abdominal  pain TECHNOLOGIST PROVIDED HISTORY: Constipation, right lower abdominal  pain Reason for Exam: supine FINDINGS: Nonspecific bowel gas pattern is seen with gaseous distension of the stomach. Mild-to-moderate amount of stool is noted in the colon. Evaluation of free air is limited on this supine view. There appears to be Trujillo catheter in place. Nonspecific bowel gas pattern with mild-to-moderate amount of stool noted in the colon. Gaseous distention of the stomach. XR CHEST PORTABLE    Result Date: 4/10/2022  EXAMINATION: ONE XRAY VIEW OF THE CHEST 4/10/2022 9:12 am COMPARISON: 04/08/2022 HISTORY: Reason for Exam: Acute hypoxia admitted with Pul edema FINDINGS: Stable cardiomegaly.   Mild central vascular congestion. Interval increased right perihilar/infrahilar opacity. No sizable effusion or discernible pneumothorax. Osseous structures grossly intact. *Stable cardiomegaly with mild central vascular congestion. *Interval increased right perihilar/infrahilar opacity which may represent developing pneumonia. XR CHEST PORTABLE    Result Date: 4/8/2022  EXAMINATION: ONE XRAY VIEW OF THE CHEST 4/8/2022 4:37 pm COMPARISON: 03/22/2022 HISTORY: ORDERING SYSTEM PROVIDED HISTORY: Shortness of breath, COPD and CHF, recent admission for pneumonia TECHNOLOGIST PROVIDED HISTORY: Shortness of breath, COPD and CHF, recent admission for pneumonia Reason for Exam: upr,sob, FINDINGS: Examination is limited by the patient's body habitus and by portable technique. Cardial pericardial silhouette is prominent but stable. There are low lung volumes is secondary bronchovascular crowding. No focal infiltrate is identified. No pneumothorax. No free air. No acute bony abnormality. Limited negative portable chest radiograph. XR CHEST PORTABLE    Result Date: 3/22/2022  EXAMINATION: ONE XRAY VIEW OF THE CHEST 3/22/2022 9:30 am COMPARISON: 03/19/2022 HISTORY: ORDERING SYSTEM PROVIDED HISTORY: improvement in pnuemonia TECHNOLOGIST PROVIDED HISTORY: improvement in pnuemonia Reason for Exam: ap uprt port chest FINDINGS: As compared to prior examination, there has been significant improvement in the right lower lobe infiltrate. Lungs appear clear. There is cardiomegaly noted. Bony structures unremarkable. Improvement in the the right basal infiltrate. XR CHEST PORTABLE    Result Date: 3/19/2022  EXAMINATION: ONE XRAY VIEW OF THE CHEST 3/19/2022 12:51 am COMPARISON: CT PA performed approximately 10 hours earlier. Portable chest obtained approximately 12 hours earlier.  HISTORY: ORDERING SYSTEM PROVIDED HISTORY: Increasing O2 requirment TECHNOLOGIST PROVIDED HISTORY: Increasing O2 requirment Reason for Exam: shortness of breath, chest pain off and on   upright port FINDINGS: Mild cardiomegaly and normal pulmonary vasculature. Right worse than left bibasilar patchy airspace opacities which appear worse than exam 12 hours earlier. No pneumothorax or pleural effusion. Surrounding structures are unremarkable. Findings suggestive of worsening bibasilar pneumonia. XR CHEST PORTABLE    Result Date: 3/18/2022  EXAMINATION: ONE XRAY VIEW OF THE CHEST 3/18/2022 11:16 am COMPARISON: Chest x-ray dated 29 June 2021 HISTORY: ORDERING SYSTEM PROVIDED HISTORY: sob TECHNOLOGIST PROVIDED HISTORY: sob FINDINGS: Mild stable cardiomegaly with pulmonary vascular congestion. No pneumothorax or pleural effusion. Stable cardiomegaly with mild pulmonary vascular congestion. CT CHEST PULMONARY EMBOLISM W CONTRAST    Result Date: 3/18/2022  EXAMINATION: CTA OF THE CHEST 3/18/2022 1:23 pm TECHNIQUE: CTA of the chest was performed after the administration of intravenous contrast.  Multiplanar reformatted images are provided for review. MIP images are provided for review. Dose modulation, iterative reconstruction, and/or weight based adjustment of the mA/kV was utilized to reduce the radiation dose to as low as reasonably achievable. COMPARISON: None HISTORY: ORDERING SYSTEM PROVIDED HISTORY: sedentary lifestyle, leg swelling, increased O2 TECHNOLOGIST PROVIDED HISTORY: sedentary lifestyle, leg swelling, increased O2 Decision Support Exception - unselect if not a suspected or confirmed emergency medical condition->Emergency Medical Condition (MA) Reason for Exam: sedentary lifestyle, leg swelling, increased O2 FINDINGS: Pulmonary Arteries: Pulmonary arteries are adequately opacified for evaluation. No evidence of intraluminal filling defect to suggest pulmonary embolism. Main pulmonary artery is normal in caliber. Mediastinum: No evidence of mediastinal lymphadenopathy.   Small reactive lymph nodes seen in the mediastinum and hilar regions. The heart and pericardium demonstrate no acute abnormality. There is no acute abnormality of the thoracic aorta. Lungs/pleura: Patchy ground-glass opacity and increased markings seen in the lower lung fields bilaterally concerning for bibasilar infiltrate. No pleural effusion or pneumothorax. No pulmonary mass or nodule. Patchy ground-glass opacity in the upper lung fields bilaterally. Upper Abdomen: Limited images of the upper abdomen are unremarkable. Soft Tissues/Bones: No acute bone or soft tissue abnormality. Degenerative changes seen within the spine with no chest wall abnormality. No evidence of pulmonary embolism. There is patchy ill-defined opacification lower lung fields bilaterally suggesting infiltrate with ground-glass opacity concerning for pneumonia as well in the upper lung fields. Reactive adenopathy throughout the mediastinum and hilar regions. RECOMMENDATIONS: Unavailable     VL DUP LOWER EXTREMITY VENOUS BILATERAL    Result Date: 3/19/2022    [facility]  Vascular Lower Extremities DVT Study Procedure   Patient Name  CARMEN       Date of Study           03/18/2022                [address] [facility]   Date of Birth 1975  Gender                  Female   Age           55 year(s)  Race                    Black   Room Number   2008        Height:                 65.98 inch, 167.6 cm   Corporate ID  [id number]    Weight:                 380 pounds, 172.4 kg  #   Patient Acct  [de-identified]   BSA:        2.63 m^2    BMI:       61.36 kg/m^2  #   MR #          [id number]     Sonographer             Yuki Alvarado   Accession #   [id number]  Interpreting Physician  Dalton Davis   Referring                 Referring Physician     Vilma Millan. Sherryle Blue, DO  Nurse  Practitioner  Procedure Type of Study:   Veins: Lower Extremities DVT Study, Venous Scan Lower Bilateral.  Indications for Study:Leg Swelling and Shortness of breath. Patient Status:ER.  Technical Quality:Limited visualization. Limitation reason:bedside exam , body habitus, deep vessels, edema. Conclusions   Summary   No evidence of superficial or deep venous thrombosis in both lower  extremities. Signature   ----------------------------------------------------------------  Electronically signed by NIKKI Altamirano(Sonographer) on  03/18/2022 01:27 PM  ----------------------------------------------------------------   ----------------------------------------------------------------  Electronically signed by Malissa Huntsman Reyes,Arthur(Interpreting  physician) on 03/19/2022 07:28 AM  ----------------------------------------------------------------  Findings:   Right Impression:                    Left Impression:  The common femoral, femoral,         The common femoral, femoral,  popliteal and tibial veins           popliteal and tibial veins  demonstrate normal compressibility   demonstrate normal compressibility  and augmentation. and augmentation. Normal compressibility of the great  Normal compressibility of the great  saphenous vein. saphenous vein. Normal compressibility of the small  Normal compressibility of the small  saphenous vein. saphenous vein. Limited visualization of the         Limited visualization of the  posterior tibial and peroneal veins. posterior tibial and peroneal veins. Risk Factors History +-------------------------------------------+----------+-------------------+ ! Diagnosis                                  ! Date      ! Comments           ! +-------------------------------------------+----------+-------------------+ ! Previous Scan                              !04/04/2008! WNL                ! +-------------------------------------------+----------+-------------------+ ! Chronic lung disease->COPD                 !          !                   ! +-------------------------------------------+----------+-------------------+ ! Previous Scan !09/22/2014! Bilateral WNL      ! +-------------------------------------------+----------+-------------------+ ! CHF                                        ! !                   ! +-------------------------------------------+----------+-------------------+   - The patient's risk factor(s) include: chronic lung disease, dyslipidemia     and arterial hypertension.   - The patient has a former tobacco history. Allergies   - Allergy:Aspirin(Drug). - Allergy:Sulfa(Drug). Velocities are measured in cm/s ; Diameters are measured in cm Right Lower Extremities DVT Study Measurements Right 2D Measurements +------------------------------------+----------+---------------+----------+ ! Location                            ! Visualized! Compressibility! Thrombosis! +------------------------------------+----------+---------------+----------+ ! Common Femoral                      !Yes       ! Yes            ! None      ! +------------------------------------+----------+---------------+----------+ ! Prox Femoral                        !Yes       ! Yes            ! None      ! +------------------------------------+----------+---------------+----------+ ! Mid Femoral                         !Partial   !Yes            ! None      ! +------------------------------------+----------+---------------+----------+ ! Dist Femoral                        !Partial   !Yes            ! None      ! +------------------------------------+----------+---------------+----------+ ! Popliteal                           !Yes       ! Yes            ! None      ! +------------------------------------+----------+---------------+----------+ ! Sapheno Femoral Junction            ! Yes       ! Yes            ! None      ! +------------------------------------+----------+---------------+----------+ ! PTV                                 ! Partial   !Yes            ! None      ! +------------------------------------+----------+---------------+----------+ !Peroneal                            !Partial   !Yes            ! None      ! +------------------------------------+----------+---------------+----------+ ! Gastroc                             ! Yes       ! Yes            ! None      ! +------------------------------------+----------+---------------+----------+ ! GSV Thigh                           ! Yes       ! Yes            ! None      ! +------------------------------------+----------+---------------+----------+ ! GSV Knee                            ! Yes       ! Yes            ! None      ! +------------------------------------+----------+---------------+----------+ ! GSV Ankle                           ! Yes       ! Yes            ! None      ! +------------------------------------+----------+---------------+----------+ ! SSV                                 ! Yes       ! Yes            ! None      ! +------------------------------------+----------+---------------+----------+ Right Doppler Measurements +--------------------------+---------+------+------------------------------+ ! Location                  ! Signal   !Reflux! Reflux (msec)                 ! +--------------------------+---------+------+------------------------------+ ! Common Femoral            !Pulsatile!      !                              ! +--------------------------+---------+------+------------------------------+ ! Prox Femoral              !Pulsatile!      !                              ! +--------------------------+---------+------+------------------------------+ ! Popliteal                 !Pulsatile!      !                              ! +--------------------------+---------+------+------------------------------+ Left Lower Extremities DVT Study Measurements Left 2D Measurements +------------------------------------+----------+---------------+----------+ ! Location                            ! Visualized! Compressibility! Thrombosis! +------------------------------------+----------+---------------+----------+ ! Common Femoral                      !Yes       ! Yes            ! None      ! +------------------------------------+----------+---------------+----------+ ! Prox Femoral                        !Yes       ! Yes            ! None      ! +------------------------------------+----------+---------------+----------+ ! Mid Femoral                         !Partial   !Yes            ! None      ! +------------------------------------+----------+---------------+----------+ ! Dist Femoral                        !Partial   !Yes            ! None      ! +------------------------------------+----------+---------------+----------+ ! Popliteal                           !Yes       ! Yes            ! None      ! +------------------------------------+----------+---------------+----------+ ! Sapheno Femoral Junction            ! Yes       ! Yes            ! None      ! +------------------------------------+----------+---------------+----------+ ! PTV                                 ! Partial   !Yes            ! None      ! +------------------------------------+----------+---------------+----------+ ! Peroneal                            !Partial   !Yes            ! None      ! +------------------------------------+----------+---------------+----------+ ! Gastroc                             ! Yes       ! Yes            ! None      ! +------------------------------------+----------+---------------+----------+ ! GSV Thigh                           ! Yes       ! Yes            ! None      ! +------------------------------------+----------+---------------+----------+ ! GSV Knee                            ! Yes       ! Yes            ! None      ! +------------------------------------+----------+---------------+----------+ ! GSV Ankle                           ! Yes       ! Yes            ! None      ! +------------------------------------+----------+---------------+----------+ ! SSV                                 ! Yes       ! Yes            ! None      ! +------------------------------------+----------+---------------+----------+ Left Doppler Measurements +--------------------------+---------+------+------------------------------+ ! Location                  ! Signal   !Reflux! Reflux (msec)                 ! +--------------------------+---------+------+------------------------------+ ! Common Femoral            !Pulsatile!      !                              ! +--------------------------+---------+------+------------------------------+ ! Prox Femoral              !Pulsatile!      !                              ! +--------------------------+---------+------+------------------------------+ ! Popliteal                 !Pulsatile!      !                              ! +--------------------------+---------+------+------------------------------+    FL MODIFIED BARIUM SWALLOW W VIDEO    Result Date: 3/19/2022  EXAMINATION: MODIFIED BARIUM SWALLOW WAS PERFORMED IN CONJUNCTION WITH SPEECH PATHOLOGY SERVICES TECHNIQUE: Fluoroscopic evaluation of the swallowing mechanism was performed using cineradiography with multiple consistency of barium product in conjunction with speech pathology services. FLUOROSCOPY DOSE AND TYPE OR TIME AND EXPOSURES: Fluoro time: 1.1 minute DAP: 22.570 mGy COMPARISON: None HISTORY: ORDERING SYSTEM PROVIDED HISTORY: h/o esophageal stricture per patient TECHNOLOGIST PROVIDED HISTORY: h/o esophageal stricture per patient 80-year-old female with history of esophageal stricture FINDINGS: No penetration or aspiration with the thin liquid and thick liquid substance by straw, pureed/pudding thick, soft solid and cookie solid substances. No penetration or aspiration with the above administered substances. Please see separate speech pathology report for full discussion of findings and recommendations.          ASSESSMENT & PLAN     ASSESSMENT / PLAN:     Principal Problem:    CHF (congestive heart failure), NYHA class I, acute on chronic, combined ([facility]  Active Problems:    Asthma    HTN (hypertension)    Acute respiratory failure with hypoxia and hypercapnia (HCC)    Morbid obesity with BMI of 50.0-59.9, adult (HCC)    Obesity hypoventilation syndrome (HCC)    STEPH (obstructive sleep apnea)    Pulmonary hypertension (HCC)    Normocytic anemia    Prediabetes    Hyperlipidemia  Resolved Problems:    * No resolved hospital problems. *      Acute on chronic hypoxic hypercapnic respiratory failure likely secondary to severe pulmonary hypertension, acute on chronic cor pulmonale with history of STEPH/OHS/morbid obesity, asthma/COPD, prior history of tracheostomy  -Continues to be on diuretics 40 IV twice daily, bronchodilators, pulmonology following  -Continues to be on high flow nasal cannula 50% FiO2 25 L, alternating with BiPAP  -Long-term plan to have trilogy ventilator, pending insurance/physician review approval.  -Currently waiting for LTAC placement    Concern for pneumonia-completed course of Levaquin this admission    Essential HTN continue current regimen    Anemia of chronic disease continue iron, folic acid, V40 supplementation    Diabetes mellitus type 2 -continue Lantus 20 nightly with sliding scale    DVT ppx: heparin  GI ppx: protonix   Diet: Regular    Patient has been seen by palliative care on 4/12/2022. Patient and family want everything to be done to the patient. CODE STATUS -full code. PT/OT/SW : On board. Discharge Planning:  Discharge to [facility].  assisting with transitional planning. Espinoza Kimbrough MD  Internal Medicine Resident, PGY-2  [address], [place]  4/17/2022, 12:27 PM        Attending Physician Statement  I have discussed the care of [address] [place] [address] and I have examined the patient myselft and taken ros and hpi , including pertinent history and exam findings,  with the resident. I have reviewed the key elements of all parts of the encounter with the resident.   I agree with the assessment, plan and orders as documented by the resident. Spent 35 minutes in reviewing data/medicines/talking to patient/family,  explaining and answering all the questions.   AC on ch hypoxic resp failure , on high flow 55%, 30L oxygen  STEPH/OHS  COPD   Severe Pulm HTN   MO   PNA,   Anemia of CD   Ac CHF   Inhalers Symbicort/spiriva, alb prn   Trilogy advised, insurance to approve   Palliative care consult appreciated   Overall prognosis very poor  LTACH placement   working with PT/OT      Electronically signed by Roosevelt Shelley MD

## 2022-04-18 NOTE — PROGRESS NOTES
[facility]inesse Tran[facility]. CHF (congestive heart failure), NYHA class I, acute on chronic, combined ([facility]) [I50.43]  Acute on chronic congestive heart failure, unspecified heart failure type ([facility] [I50.9] . Vitals:    04/18/22 1605   BP:    Pulse:    Resp:    Temp:    SpO2: 90%   . Patients home meds are   Prior to Admission medications    Medication Sig Start Date End Date Taking? Authorizing Provider   gabapentin (NEURONTIN) 300 MG capsule Take 1 capsule by mouth 3 times daily for 10 days. 4/13/22 4/23/22 Yes Chayo Acosta MD   amLODIPine (NORVASC) 5 MG tablet Take 1 tablet by mouth daily 3/28/22   Solomon Headley MD   diazePAM (VALIUM) 5 MG tablet Take 5 mg by mouth every 12 hours as needed for Anxiety.     Historical Provider, MD   folic acid (FOLVITE) 1 MG tablet Take 1 mg by mouth daily    Historical Provider, MD   vitamin B-12 (CYANOCOBALAMIN) 1000 MCG tablet Take 1,000 mcg by mouth daily    Historical Provider, MD   Dulaglutide (TRULICITY) 1.47 EY/4.3BJ SOPN Inject 0.75 mg into the skin once a week    Historical Provider, MD   metOLazone (ZAROXOLYN) 2.5 MG tablet Take 2.5 mg by mouth every other day    Historical Provider, MD   glipiZIDE (GLUCOTROL XL) 2.5 MG extended release tablet Take 2.5 mg by mouth  Patient not taking: Reported on 4/9/2022    Historical Provider, MD   SM MUCUS RELIEF 600 MG extended release tablet  10/15/19   Historical Provider, MD   nystatin (MYCOSTATIN) 125616 UNIT/GM cream  10/17/19   Historical Provider, MD   Ergocalciferol (VITAMIN D2 PO) Take 50,000 Units by mouth once a week  Patient not taking: Reported on 3/18/2022    Historical Provider, MD   potassium chloride (KLOR-CON M) 10 MEQ extended release tablet Take 1 tablet by mouth daily 7/25/19   Sarah Christina MD   bumetanide (BUMEX) 1 MG tablet Take 2 mg by mouth 2 times daily    Historical Provider, MD   JANUVIA 50 MG tablet Take 50 mg by mouth daily 6/11/19   Historical Provider, MD   glucose monitoring kit (FREESTYLE) monitoring kit 1 kit by Does not apply route daily 5/8/19   Sudhir Craig MD   blood glucose monitor strips Test three  times a day & as needed for symptoms of irregular blood glucose. 5/8/19   Sudihr Craig MD   Lancets MISC 1 each by Does not apply route daily 5/8/19   Sudhir Craig MD   pantoprazole sodium (PROTONIX) 40 MG PACK packet Take 40 mg by mouth every morning (before breakfast)    Historical Provider, MD   spironolactone (ALDACTONE) 25 MG tablet Take 1 tablet by mouth daily 11/22/18   Shelbie Morales MD   isosorbide dinitrate (ISORDIL) 20 MG tablet Take 1 tablet by mouth 3 times daily 9/20/18   Anabela Sparks MD   hydrALAZINE (APRESOLINE) 25 MG tablet Take 1 tablet by mouth every 8 hours 9/20/18   Anabela Sparks MD   Blood Pressure KIT 1 Device by Does not apply route 2 times daily 9/20/18   Johnathon Mares MD   ferrous sulfate 325 (65 Fe) MG EC tablet Take 325 g by mouth 2 times daily (with meals) 4/20/18   Historical Provider, MD   loratadine (CLARITIN) 10 MG tablet Take 10 mg by mouth daily    Historical Provider, MD   tiotropium (SPIRIVA) 18 MCG inhalation capsule Inhale 1 capsule into the lungs  8/31/17   Historical Provider, MD   sertraline (ZOLOFT) 100 MG tablet Take 100 mg by mouth daily    Historical Provider, MD   atorvastatin (LIPITOR) 40 MG tablet Take 1 tablet by mouth nightly 2/21/18   Tal Hamilton MD   albuterol sulfate  (90 Base) MCG/ACT inhaler Inhale 2 puffs into the lungs every 6 hours as needed for Wheezing    Historical Provider, MD   fluticasone (FLONASE) 50 MCG/ACT nasal spray 1 spray by Nasal route daily     Historical Provider, MD   albuterol (PROVENTIL) (2.5 MG/3ML) 0.083% nebulizer solution Take 3 mLs by nebulization every 6 hours as needed for Wheezing. 9/24/14   Zain Snow MD   budesonide-formoterol (SYMBICORT) 160-4.5 MCG/ACT AERO Inhale 2 puffs into the lungs 2 times daily.  9/24/14   Tiago Jones MD Assessment   Takes PRN breathing treatments at home     RR 21  Breath Sounds: DIMINISHED      · Bronchodilator assessment at level  3    ·   · [x]    Bronchodilator Assessment  BRONCHODILATOR ASSESSMENT SCORE  Score 0 1 2 3 4 5   Breath Sounds   []  Patient Baseline []  No Wheeze good aeration []  Faint, scattered wheezing, good aeration [x]  Expiratory Wheezing and or moderately diminished []  Insp/Exp wheeze and/or very diminished []  Insp/Exp and/ or marked distress   Respiratory Rate   []  Patient Baseline []  Less than 20 []  Less than 20 [x]  20-25 []  Greater than 25 []  Greater than 25   Peak flow % of Pred or PB [x]  NA   []  Greater than 90%  []  81-90% []  71-80% []  Less than or equal to 70%  or unable to perform []  Unable due to Respiratory Distress   Dyspnea re []  Patient Baseline []  No SOB []  No SOB [x]  SOB on exertion []  SOB min activity []  At rest/acute   e FEV% Predicted       [x]  NA []  Above 69%  []  Unable []  Above 60-69%  []  Unable []  Above 50-59%  []  Unable []  Above 35-49%  []  Unable []  Less than 35%  []  Unable

## 2022-04-18 NOTE — CARE COORDINATION
Transitional planning:  Called Kofi at [facility], still awaiting precert auth. Cannot take anyone else on day shift, but might be able to take around 8 pm today. 1045 Transportation packet includes: H & P, MAR report, Internal medicine and pulmonology clinical note from 22. Faxed Demographic, medical necessity and transportation form to [facility]. [address] called back from [facility] and can transport at [address] hrs today. [address] called from [place] and states the [facility], [facility] is not appropriate now, she is getting what she needs in current setting. Peer to peer : [phone]. Ref [id number]. Provide name, phone number and their availability. Expedited appeal is [phone].     [address] Raine Woods group, to Lorelei Jamison MD the message above. Expires in 48 hrs. [id number] [facility] and canceled transportation at 2000 hrs.   1522 Message read, no response. 80 Dr. Soriano  came to unit and asked about peer to peer phone number, they are only able to leave message. Encouraged to leave message with their availability. [phone], pt's father and notified of the denial and peer to peer. Explained the insurance physician denied. He voiced gratitude for the call and understanding.

## 2022-04-18 NOTE — PROGRESS NOTES
Occupational Therapy  Facility/Department: [facility] CAR 2  Daily Treatment Note  NAME: Tree Tanner  : 1975  MRN: [id number]    Date of Service: 2022    Discharge Recommendations:  Patient would benefit from continued therapy after discharge in order to increase pt balance, strength and independence. ADLs completed in bed with pt lavonne lifted to chair to attempt standing at chair sec to pt short stature and increased bed height. Assessment   Performance deficits / Impairments: Decreased functional mobility ; Decreased balance;Decreased ADL status; Decreased endurance;Decreased strength;Decreased posture;Decreased high-level IADLs;Decreased safe awareness  Prognosis: Fair  OT Education: OT Role;Transfer Training;ADL Adaptive Strategies;Precautions; Energy Conservation  Patient Education: proper hand and foot placement; balance maintaince; adaptive dresssing techniques; pursed lip breathing  Barriers to Learning: pt demo F carry over  REQUIRES OT FOLLOW UP: Yes  Activity Tolerance  Activity Tolerance: Patient Tolerated treatment well;Patient limited by fatigue  Activity Tolerance: decreased strength  Safety Devices  Safety Devices in place: Yes  Type of devices: Left in chair;Nurse notified;Call light within reach;Gait belt  Restraints  Initially in place: No         Patient Diagnosis(es): The encounter diagnosis was Acute on chronic congestive heart failure, unspecified heart failure type ([address]       has a past medical history of Acute on chronic diastolic CHF (congestive heart failure) ([address] HIEN (acute kidney injury) ([address] HIEN (acute kidney injury) ([address] Asthma, CHF, Chronic obstructive lung disease ([address] Chronic respiratory failure with hypoxia ([address] COPD, Diabetes mellitus, new onset ([address] Former smoker, HTN, Hyperglycemia, Hyperlipidemia, Hypertension, Morbid obesity with BMI of 60.0-69.9, adult ([address] Neuromuscular disorder ([address] STEPH on CPAP, Oxygen dependent, Pedal edema, Pneumonia, Pulmonary hypertension, moderate to severe ([facility] Torn meniscus, and Wears glasses. has a past surgical history that includes  section (); Abdomen surgery; joint replacement; Cystoscopy (2019); Cystoscopy (N/A, 2019); hc cath power picc triple (2018); pr office/outpt visit,procedure only (N/A, 2018); Tracheotomy (2018); Gastrostomy tube placement (2018); and tracheostomy closure (2018). Restrictions  Restrictions/Precautions  Restrictions/Precautions: Fall Risk,Up as Tolerated,General Precautions  Required Braces or Orthoses?: No  Position Activity Restriction  Other position/activity restrictions: up w/assist. Hi-yair O2 . Subjective   General  Chart Reviewed: Yes  Patient assessed for rehabilitation services?: Yes  Response to previous treatment: Patient with no complaints from previous session  Family / Caregiver Present: No  General Comment  Comments: Pt and RN agreeable to therapy this day  Pain Assessment  Pain Assessment: 0-10  Pain Level: 5  Pain Type: Chronic pain  Pain Location: Wrist;Knee  Pain Orientation: Right  Pain Descriptors: Aching; Sore  Pain Frequency: Continuous  Non-Pharmaceutical Pain Intervention(s): Ambulation/Increased Activity; Distraction;Repositioned  Pre Treatment Pain Screening  Comments / Details: Pt supine in bed at start of session pleasant and cooperative. At session end pt retired to seated in chair with BLE elevated, call light in reach, RN aware and all needs met. Vital Signs  Patient Currently in Pain: Yes   Orientation  Orientation  Overall Orientation Status: Within Functional Limits  Objective    ADL  Grooming: Modified independent ;Setup (oral care, face washing)  UE Dressing: Stand by assistance;Setup (to doff/don gown)  LE Dressing: Maximum assistance;Setup;Verbal cueing (to doff/don socks)  Toileting: Maximum assistance;Setup; Increased time to complete (supine in bed)  Additional Comments: Grooming tasks facilitated with pt supine in bed at start of session req Mod I.UB dressing facilitated with pt supine in bed to doff/don gown at SBA req increased time able to thread gown up and over shoulders. Max A to doff/don socks sec to pt habitus and decreased endurance. Pt c/o soiled brief req Max A for toileting supine in bed with personal hygiene and brief management facilitated rolling. Throughout session pt limited per habitus, decreased strength and decreased endurance. Balance  Sitting Balance: Stand by assistance (Pt tolerated approx 4-5 min static sitting unsupported in chair)  Standing Balance: Moderate assistance (Mod x2)  Standing Balance  Time: approx 1 min  Activity: static standing at chair  Comment: utilizing bariatric RW  Functional Mobility  Functional Mobility Comments: RAMONE sec to P standing tolerance/balance  Bed mobility  Rolling to Left: Moderate assistance  Rolling to Right: Minimal assistance  Comment: multiple rolls completed utilizing bed rails  Transfers  Sit to stand: Moderate assistance;2 Person assistance  Stand to sit: 2 Person assistance; Moderate assistance  Transfer Comments: utilizing bariatric RW standing at platform stool sec to pt short stature                       Cognition  Overall Cognitive Status: [address] [place] [address]  Times per week: 3-4x  Current Treatment Recommendations: Strengthening,Balance Training,Functional Mobility Training,Self-Care / ADL,Safety Education & Training,Endurance Training                                            AM-PAC Score        AM-[facility] Inpatient Daily Activity Raw Score: 17 (04/18/22 1529)  AM-PAC Inpatient ADL T-Scale Score : 37.26 (04/18/22 1529)  ADL Inpatient CMS 0-100% Score: 50.11 (04/18/22 1529)  ADL Inpatient CMS G-Code Modifier : CK (04/18/22 1529)    Goals  Short term goals  Time Frame for Short term goals: Patient will, by discharge.   Short term goal 1: Demo baseline grooming tasks with Mod I  Short term goal 2: Demo 15+ minutes of functional activity tolerance to engage in ADLs  Short term goal 3: Demo 4+/5 gross muscle grade in B UE to improve performance in functional tasks  Short term goal 4: Demo pursed lip breathing techniques with 1 VC for ADL completion  Short term goal 5: Demo functional bed mobility with min A to engage in functional tasks  Short term goal 6: Demo Mod A for functional transfers and functional mobility with LRD for improved independence with ADLs/IADLs (goal added by HANDY Crow/L in collaboration with NATALIA Carolina/L on 04/14/2022)  Short term goal 7: Notify OTR to update POC as patient progresses  Patient Goals   Patient goals :  To return home       Therapy Time   Individual Concurrent Group Co-treatment   Time In  1014         Time Out  1111         Minutes  57             Time coded: 45 minutes (co tx with PTA req 2 person assist sec to habitus and decreased strength, FISHER engaging in ADLs and endurance with PTA engaging in mobility and balance; 5 minutes to lavonne lift to chair)    NATALIA Hendrickson/MARION

## 2022-04-18 NOTE — PROGRESS NOTES
Physical Therapy  Facility/Department: [facility] 2  Daily Treatment Note  NAME: Fariba Anderson  : 1975  MRN: [id number]    Date of Service: 2022    Discharge Recommendations:  Patient would benefit from continued therapy after discharge   PT Equipment Recommendations  Equipment Needed: No  Other: pt currenlty requires significant physical assistance for all aspects of mobility    Assessment   Body structures, Functions, Activity limitations: Decreased functional mobility ; Decreased balance;Decreased ROM; Decreased strength;Decreased endurance; Increased pain  Assessment: Pt required Marychuy-modA  to perform bed mobility,  completed STS with ModA from EOC to bariatric RW and maintained standing ~ 3 mins with Marychuy x2. Pt is currently unsafe to perform functional mobility unassisted and is expected to require continued skilled PT to address functional mobility and endurance deficits to return pt to prior level of function. Prognosis: Fair  PT Education: Goals;PT Role;General Safety; Functional Mobility Training;Home Exercise Program;Energy Conservation;Transfer Training  REQUIRES PT FOLLOW UP: Yes  Activity Tolerance  Activity Tolerance: Patient limited by endurance; Patient limited by fatigue  Activity Tolerance: on high flow O2     Patient Diagnosis(es): The encounter diagnosis was Acute on chronic congestive heart failure, unspecified heart failure type ([address]      has a past medical history of Acute on chronic diastolic CHF (congestive heart failure) ([address] HIEN (acute kidney injury) ([address] HIEN (acute kidney injury) ([address] Asthma, CHF, Chronic obstructive lung disease ([address] Chronic respiratory failure with hypoxia ([address] COPD, Diabetes mellitus, new onset ([address] Former smoker, HTN, Hyperglycemia, Hyperlipidemia, Hypertension, Morbid obesity with BMI of 60.0-69.9, adult ([address] Neuromuscular disorder ([address] STEPH on CPAP, Oxygen dependent, Pedal edema, Pneumonia, Pulmonary hypertension, moderate to severe ([address] Torn meniscus, and Wears glasses. has a past surgical history that includes  section (); Abdomen surgery; joint replacement; Cystoscopy (2019); Cystoscopy (N/A, 2019); hc cath power picc triple (2018); pr office/outpt visit,procedure only (N/A, 2018); Tracheotomy (2018); Gastrostomy tube placement (2018); and tracheostomy closure (2018). Restrictions  Restrictions/Precautions  Restrictions/Precautions: Fall Risk,Up as Tolerated,General Precautions  Required Braces or Orthoses?: No  Position Activity Restriction  Other position/activity restrictions: up w/assist. Hi-yair O2 . Subjective   General  Response To Previous Treatment: Patient with no complaints from previous session. Family / Caregiver Present: No  Subjective  Subjective: Pt and RN agreeable to PT. Pt alert in bed upon arrival, on high flow O2 upon arrival, pleasant and cooperative throughout. General Comment  Comments: Pt left in reciiner with call light within reach, left with lift pad under her for dependent transfer back to bed. RN aware  Pain Screening  Patient Currently in Pain: Yes  Pain Assessment  Pain Assessment: 0-10  Pain Level: 4  Pain Type: Chronic pain  Pain Location: Wrist;Leg  Pain Orientation: Right  Pain Descriptors: Sore  Pain Frequency: Continuous  Pain Onset: On-going  Clinical Progression: Gradually improving  Functional Pain Assessment: Prevents or interferes some active activities and ADLs  Non-Pharmaceutical Pain Intervention(s): Ambulation/Increased Activity;Repositioned; Therapeutic presence  Response to Pain Intervention: Patient Satisfied  Vital Signs  Patient Currently in Pain: Yes       Orientation  Orientation  Overall Orientation Status: Within Normal Limits  Cognition      Objective   Bed mobility  Rolling to Left: Moderate assistance  Rolling to Right: Minimal assistance  Scooting: Moderate assistance  Comment: pt able to utilize bedrails to assist with rolling, HOB flat. Pt completed multiple trials of rolling for activities of hygiene prior to transfer to recliner  Transfers  Sit to Stand: Moderate Assistance;2 Person Assistance  Stand to sit: Moderate Assistance;2 Person Assistance  Bed to Chair: Dependent/Total (with overhead lift)  Comment: pt completed 1 STS transfers with bariatric RW from recliner, pt required step placed in front of recliner secondary to pt short stature and body habitus  Ambulation  Ambulation?: No (pt completed marching while standing on step x 6 reps with Marychuy x2 to maintain balance while on High flow O2)     Balance  Posture: Fair  Sitting - Static: Fair;+  Sitting - Dynamic: Fair;+  Standing - Static: Fair  Standing - Dynamic: Fair  Comments: standing balance assessed with bariatric RW, pt able to maintain standing ~3 mins x1  Exercises  Pt to complete Supine exs when she returns to bed, PTA reviewed previously issued HEP, pt verbalized understanding and completed 2-3 reps of each ex to demonstrate   Supine Exercises: Ankle Pumps, Gluteal sets, Hamstring Sets, Heel Slides, Hip ABD/ADD, Quad Sets, SLR. Comments:     AM-PAC Score  -PAC Inpatient Mobility Raw Score : 9 (04/15/22 1410)  -PAC Inpatient T-Scale Score : 30.55 (04/15/22 1410)  Mobility Inpatient CMS 0-100% Score: 81.38 (04/15/22 1410)  Mobility Inpatient CMS G-Code Modifier : CM (04/15/22 1410)          Goals  Short term goals  Time Frame for Short term goals: 14  Short term goal 1: Pt to perform bed mobility Marychuy  Short term goal 2: Pt to attempt functional transfers Marychuy  Short term goal 3: Demonstrate standing balance of good - to decrease fall risk  Short term goal 4: Actively participate in 30 minutes of therapy to demo increased endurance  Short term goal 5: Pt to ambulate 10ft w/ RW Marychuy  Patient Goals   Patient goals :  To get well    Plan    Plan  Times per week: 5x/week  Times per day: Daily  Current Treatment Recommendations: Strengthening,Home Exercise Program,ROM,Safety Education & Training,Balance Training,Patient/Caregiver Education & Training,Endurance Training,Functional Mobility Training,Transfer Training,Gait Training  Safety Devices  Type of devices: Call light within reach,Left in bed,Patient at risk for falls,Nurse notified  Restraints  Initially in place: No  Restraints: 4 bed rails per pt request     Therapy Time   Individual Concurrent Group Co-treatment   Time In 1020         Time Out 1101         Minutes 41         Timed Code Treatment Minutes: [place], [place]

## 2022-04-18 NOTE — PLAN OF CARE
Called for peer to peer on number-  [phone] , was asked to leave a voicemail. Also tried the number for expedited appeal [phone] but was requested to go back and call the previous number. Left voicemail with provider name, phone number with availability. Provided all the information asked including the reference number, patient name, MRN, diagnosis and  member ID. Will keep following.

## 2022-04-18 NOTE — PROGRESS NOTES
[facility]  Internal Medicine Teaching Residency Program  Inpatient Daily Progress Note  ______________________________________________________________________________    Patient: Fariba Anderson  YOB: 1975   KPU:[id number]    Acct: [de-identified]     Room: 2004/2004-01  Admit date: 4/8/2022  Today's date: 04/18/22  Number of days in the hospital: 10    SUBJECTIVE   Admitting Diagnosis: CHF (congestive heart failure), NYHA class I, acute on chronic, combined ([facility] [address]  CC: SOB  Pt examined at bedside. Chart & results reviewed. No acute events overnight. Vitals and labs reviewed  Patient continues to be on high flow 25 L 55% FiO2 alternating with BiPAP   Potassium dropped to 3.2  Patient complains of nausea and non bloody emesis for 2 days associated with loose stools. Likely side effect of antibiotic usage. Received Compazine.  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 ROS:  Constitutional:  negative for chills, fevers, sweats  Respiratory:  negative for cough, wheezing, hemoptysis  Cardiovascular: lower extremity edema, palpitations  Gastrointestinal:  negative for abdominal pain, constipation, diarrhea, nausea, vomiting  Neurological:  negative for dizziness, headache    BRIEF HISTORY     The patient is a pleasant 55 y.o. 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 Patient states she has had increased shortness of breath the past 2 nights, unable to wear her CPAP nightly with increased fatigue. Patient is poor historian, states she wears 7 L O2 at home, however EMS on arrival saw patient on 4 L and was saturating in the mid 80s. Patient was placed on nonrebreather by EMS and saturations improved to 98%. Of note, patient had previous hospitalization earlier this month for COPD exacerbation complicated by pneumonia.   At that time patient was treated with antibiotics and steroids. Pulmonology was consulted and patient was approved for trilogy noninvasive ventilator which she has yet to receive. Echocardiogram at that time showed EF 60%, \"D\" sign indicating RV pressure overload, moderate to severe MR, severe pulmonary hypertension. Patient is aware of these findings. On my evaluation, patient resting comfortably on 15 L nonrebreather mask acute distress. Patient is morbidly obese with BMI 57. Denies any cough, chest pain, leg pain/swelling. She does have skin irritation on her buttocks. States she has been compliant with all meds (inculding bronchodilators and bumex 2mg BID) except for her PO DM meds as those were held on previous admission. Afebrile, hemodynamic stable, tachycardic in the 110s at present. Patient received prednisone and lasix 40mg IV in ED as well as rocephin and vancomycin. CXR in ED - limited negative CXR d/t body habitus. Significant labs include proBNP on admission 4192, was in 1000s on recent discharge. Leukocytosis of 17.0. Hemoglobin 9.7. Slight bump in creatinine from baseline- 1.21. glucose 238.       4/10/2022-episode of hypoxia, patient drank 3.5 L of fluid admitted with CHF exacerbation, getting a chest x-ray, IV diuretics to continue, labs de-escalate, prednisone discontinued. 4/11/2022 - Patient has drank 2.5 L in the last 24 hours. Admitted with CHF exacerbation, currently on IV Lasix 80 mg IV twice daily. Did receive this morning. We will get a chest x-ray to look for any worsening pulmonary edema. Mild edema bilateral lower extremities noted. Labs Descalated, prednisone discontinued    4/13/2022 - Overnight BG went up to 423 and then insulin Lantus was increased to 20 nightly.     4/14/2022 -Lasix changed from 80 mg to 40 mg IV twice daily       OBJECTIVE     Vital Signs:  /65   Pulse 100   Temp 98.2 °F (36.8 °C) (Oral)   Resp 30   Ht 5' 6\" (1.676 m)   Wt (!) 343 lb (155.6 kg)   SpO2 90%   BMI 55.36 kg/m² Temp (24hrs), Av.4 °F (36.9 °C), Min:98.1 °F (36.7 °C), Max:98.9 °F (37.2 °C)    In: 1000   Out: 1900 [Urine:1900]    Physical Exam:  Constitutional: This is a well developed, well nourished, Greater than 40 - Morbid Obesity / Extreme Obesity / Grade III 55y.o. year old female who is alert, oriented, cooperative and in no apparent distress. Head:normocephalic and atraumatic. On high flow nasal cannula  EENT:  PERRLA. No conjunctival injections. Septum was midline, mucosa was without erythema, exudates or cobblestoning. No thrush was noted. Neck: Supple without thyromegaly. No elevated JVP. Trachea was midline. Respiratory: Breath sounds clear to auscultation bilaterally. No wheezes, crackles or rhonchi. Cardiovascular: Regular without murmur, clicks, gallops or rubs. Abdomen: Abdomen distended, bowel sounds present. No organomegaly. Lymphatic: No lymphadenopathy. Musculoskeletal: Normal curvature of the spine. No gross muscle weakness. Extremities: Chronic bilateral lower extremity edema  Skin:  Warm and dry. Good color, turgor and pigmentation. No lesions or scars.   No cyanosis or clubbing  Neurological/Psychiatric: The patient's general behavior, level of consciousness, thought content and emotional status is normal.        Medications:  Scheduled Medications:    lactobacillus  1 capsule Oral Daily with breakfast    furosemide  40 mg IntraVENous BID    lidocaine  1 patch TransDERmal Daily    insulin glargine  20 Units SubCUTAneous Nightly    insulin lispro  0-18 Units SubCUTAneous TID     insulin lispro  0-9 Units SubCUTAneous Nightly    sertraline  100 mg Oral Daily    albuterol  2.5 mg Nebulization 4x daily    amLODIPine  5 mg Oral Daily    atorvastatin  40 mg Oral Nightly    budesonide-formoterol  2 puff Inhalation BID    ferrous sulfate  325 mg Oral BID WC    folic acid  1 mg Oral Daily    hydrALAZINE  25 mg Oral 3 times per day    isosorbide dinitrate  20 mg Oral TID pantoprazole  40 mg Oral QAM AC    spironolactone  25 mg Oral Daily    tiotropium  2 puff Inhalation Daily    vitamin B-12  1,000 mcg Oral Daily    sodium chloride flush  10 mL IntraVENous 2 times per day    heparin (porcine)  5,000 Units SubCUTAneous 3 times per day    gabapentin  300 mg Oral TID     Continuous Infusions:    sodium chloride Stopped (04/09/22 0636)    dextrose       PRN Medicationscalcium carbonate, 500 mg, TID PRN  oxyCODONE-acetaminophen, 1 tablet, Q6H PRN  acetaminophen, 650 mg, Q4H PRN  sodium chloride, 1 spray, PRN  albuterol sulfate HFA, 2 puff, Q6H PRN  diazePAM, 5 mg, Q12H PRN  sodium chloride flush, 10 mL, PRN  sodium chloride, , PRN  magnesium sulfate, 1,000 mg, PRN  ondansetron, 4 mg, Q8H PRN   Or  ondansetron, 4 mg, Q6H PRN  polyethylene glycol, 17 g, Daily PRN  glucose, 15 g, PRN  dextrose, 12.5 g, PRN  glucagon (rDNA), 1 mg, PRN  dextrose, 100 mL/hr, PRN  albuterol, 2.5 mg, As Directed RT PRN        Diagnostic Labs:  CBC:   Recent Labs     04/16/22  0630   WBC 8.7   RBC 4.01   HGB 10.4*   HCT 35.6*   MCV 88.8   RDW 16.4*        BMP:   Recent Labs     04/16/22  0630 04/18/22  0544    135   K 3.6* 3.2*   CL 88* 86*   CO2 41* 35*   BUN 28* 21*   CREATININE 1.05* 1.12*     BNP: No results for input(s): BNP in the last 72 hours. PT/INR: No results for input(s): PROTIME, INR in the last 72 hours. APTT: No results for input(s): APTT in the last 72 hours. CARDIAC ENZYMES: No results for input(s): CKMB, CKMBINDEX, TROPONINI in the last 72 hours. Invalid input(s): CKTOTAL;3  FASTING LIPID PANEL:  Lab Results   Component Value Date    CHOL 144 11/17/2018    HDL 42 10/07/2020    TRIG 68 11/17/2018     LIVER PROFILE:   No results for input(s): AST, ALT, ALB, BILIDIR, BILITOT, ALKPHOS in the last 72 hours.    MICROBIOLOGY:   Lab Results   Component Value Date/Time    CULTURE NO GROWTH 5 DAYS 04/09/2022 02:22 AM       Imaging:    XR CHEST PORTABLE    Result Date: 4/8/2022  Limited negative portable chest radiograph. ECHO Complete 2D W Doppler W Color    Result Date: 3/22/2022  Transthoracic Echocardiography Report (TTE)  Patient Name PRICE       Date of Study               03/22/2022               Evette Kirby   Date of      1975  Gender                      Female  Birth   Age          55 year(s)  Race                        Black   Room Number  2008        Height:                     65.98 inch, 167.6 cm   Corporate ID [id number]    Weight:                     380 pounds, 172.4 kg  #   Patient Acct [de-identified]   BSA:          2.63 m^2      BMI:      61.36  #                                                              kg/m^2   MR #         [id number]     [place] [employer]   Accession #  [id number]  Interpreting Physician      [address] [place] [address]   Fellow                   Referring Nurse                           Practitioner   Interpreting             Referring Physician         [facility]  Fellow  Additional Comments Technically difficult study. Type of Study   TTE procedure:2D Echocardiogram, M-Mode, Doppler, Color Doppler. Procedure Date Date: 03/22/2022 Start: 03:37 PM Study Location: [facility] Technical Quality: Adequate visualization Indications:Dyspnea/SOB. History / Tech. Comments: Echo done at patient bedside. Procedure explained to patient. HTN, COPD, PHTN, STEPH, HX NSTEMI Patient Status: Inpatient Height: 65.98 inches Weight: 380 pounds BSA: 2.63 m^2 BMI: 61.36 kg/m^2 Allergies   - Aspirin.   - Sulfa. CONCLUSIONS Summary Left ventricle is normal in size. Global left ventricular systolic function is normal. Calculated ejection fraction 60% by Heart Model. Marked Leftward compression of inter-ventricular septum (\"D-sign\") indicating RV volume or pressure overload. Right ventricular function appears reduced. Moderately dilated right ventricular cavity. Moderate to severe tricuspid regurgitation. Severe pulmonary hypertension.  Estimated right ventricular systolic pressure is 03URJD. Trivial pulmonic insufficiency. Signature ----------------------------------------------------------------------------  Electronically signed by Juliet Murdock(Sonographer) on 03/22/2022  04:14 PM ---------------------------------------------------------------------------- ----------------------------------------------------------------------------  Electronically signed by Don Ellington(Interpreting physician) on 03/23/2022  11:48 AM ---------------------------------------------------------------------------- FINDINGS Left Atrium Left atrium is normal in size. Left Ventricle Left ventricle is normal in size. Global left ventricular systolic function is normal. Calculated ejection fraction 60% by Heart Model. Marked Leftward compression of inter-ventricular septum (\"D-sign\") indicating RV volume or pressure overload. Right Atrium Right atrium is normal in size. Right Ventricle Right ventricular function appears reduced. Moderately dilated right ventricular cavity. Mitral Valve Normal mitral valve structure and function. No mitral regurgitation. Aortic Valve Aortic valve is trileaflet and opens adequately. No aortic insufficiency. Tricuspid Valve No obvious valvular abnormality. Moderate to severe tricuspid regurgitation. Severe pulmonary hypertension. Estimated right ventricular systolic pressure is 89UBVM. Pulmonic Valve The pulmonic valve is normal in structure. Trivial pulmonic insufficiency. Pericardial Effusion No pericardial effusion seen. Miscellaneous E/E' average = 7.1. IVC normal diameter & inspiratory collapse indicating normal RA filling pressure .  M-mode / 2D Measurements & Calculations:   LVIDd:3.4 cm(3.7 - 5.6 cm)       Diastolic CQDHBS:15.0 ml  LLUCC:3.5 cm(2.2 - 4.0 cm)       Systolic NRAIZO:33.1 ml  XHFI:5.6 cm(0.6 - 1.1 cm)        Aortic Root:2.8 cm(2.0 - 3.7 cm)  LVPWd:1 cm(0.6 - 1.1 cm)         LA Dimension: 2.4 cm(1.9 - 4.0 cm)  Fractional Shortenin.24 %    LA volume/Index: 47.93 ml /18m^2  Calculated LVEF (%): 60.84 %     LVOT:1.7 cm                                   RVDd:4.91 cm   Mitral:                                 Aortic   Valve Area (P1/2-Time): 3.86 cm^2       Peak Velocity: 1.66 m/s  Peak E-Wave: 0.75 m/s                   Mean Velocity: 1.04 m/s  Peak A-Wave: 0.60 m/s                   Peak Gradient: 11.02 mmHg  E/A Ratio: 1.25                         Mean Gradient: 5 mmHg  Peak Gradient: 2.25 mmHg  Mean Gradient: 2 mmHg  Deceleration Time: 195 msec             Area (continuity): 1.37 cm^2  P1/2t: 57 msec                          AV VTI: 29.9 cm   Area (continuity): 1.96 cm^2  Mean Velocity: 0.75 m/s   Tricuspid:                              Pulmonic:   Peak TR Velocity: 4.34 m/s              Peak Velocity: 1.22 m/s  Peak TR Gradient: 75.3424 mmHg          Peak Gradient: 5.95 mmHg  Diastology / Tissue Doppler Septal Wall E' velocity:0.10 m/s Septal Wall E/E':7.7 Lateral Wall E' velocity:0.11 m/s Lateral Wall E/E':6.6    XR ABDOMEN (KUB) (SINGLE AP VIEW)    Result Date: 2022  EXAMINATION: ONE SUPINE XRAY VIEW(S) OF THE ABDOMEN 2022 12:45 pm COMPARISON: CT dated 2019. HISTORY: ORDERING SYSTEM PROVIDED HISTORY: Constipation, right lower abdominal  pain TECHNOLOGIST PROVIDED HISTORY: Constipation, right lower abdominal  pain Reason for Exam: supine FINDINGS: Nonspecific bowel gas pattern is seen with gaseous distension of the stomach. Mild-to-moderate amount of stool is noted in the colon. Evaluation of free air is limited on this supine view. There appears to be Trujillo catheter in place. Nonspecific bowel gas pattern with mild-to-moderate amount of stool noted in the colon. Gaseous distention of the stomach.      XR CHEST PORTABLE    Result Date: 4/10/2022  EXAMINATION: ONE XRAY VIEW OF THE CHEST 4/10/2022 9:12 am COMPARISON: 2022 HISTORY: Reason for Exam: Acute hypoxia admitted with Pul edema FINDINGS: Stable cardiomegaly. Mild central vascular congestion. Interval increased right perihilar/infrahilar opacity. No sizable effusion or discernible pneumothorax. Osseous structures grossly intact. *Stable cardiomegaly with mild central vascular congestion. *Interval increased right perihilar/infrahilar opacity which may represent developing pneumonia. XR CHEST PORTABLE    Result Date: 4/8/2022  EXAMINATION: ONE XRAY VIEW OF THE CHEST 4/8/2022 4:37 pm COMPARISON: 03/22/2022 HISTORY: ORDERING SYSTEM PROVIDED HISTORY: Shortness of breath, COPD and CHF, recent admission for pneumonia TECHNOLOGIST PROVIDED HISTORY: Shortness of breath, COPD and CHF, recent admission for pneumonia Reason for Exam: upr,sob, FINDINGS: Examination is limited by the patient's body habitus and by portable technique. Cardial pericardial silhouette is prominent but stable. There are low lung volumes is secondary bronchovascular crowding. No focal infiltrate is identified. No pneumothorax. No free air. No acute bony abnormality. Limited negative portable chest radiograph. XR CHEST PORTABLE    Result Date: 3/22/2022  EXAMINATION: ONE XRAY VIEW OF THE CHEST 3/22/2022 9:30 am COMPARISON: 03/19/2022 HISTORY: ORDERING SYSTEM PROVIDED HISTORY: improvement in pnuemonia TECHNOLOGIST PROVIDED HISTORY: improvement in pnuemonia Reason for Exam: ap uprt port chest FINDINGS: As compared to prior examination, there has been significant improvement in the right lower lobe infiltrate. Lungs appear clear. There is cardiomegaly noted. Bony structures unremarkable. Improvement in the the right basal infiltrate. XR CHEST PORTABLE    Result Date: 3/19/2022  EXAMINATION: ONE XRAY VIEW OF THE CHEST 3/19/2022 12:51 am COMPARISON: CT PA performed approximately 10 hours earlier. Portable chest obtained approximately 12 hours earlier.  HISTORY: ORDERING SYSTEM PROVIDED HISTORY: Increasing O2 requirment TECHNOLOGIST PROVIDED HISTORY: Increasing O2 requirment Reason for Exam: shortness of breath, chest pain off and on   upright port FINDINGS: Mild cardiomegaly and normal pulmonary vasculature. Right worse than left bibasilar patchy airspace opacities which appear worse than exam 12 hours earlier. No pneumothorax or pleural effusion. Surrounding structures are unremarkable. Findings suggestive of worsening bibasilar pneumonia. XR CHEST PORTABLE    Result Date: 3/18/2022  EXAMINATION: ONE XRAY VIEW OF THE CHEST 3/18/2022 11:16 am COMPARISON: Chest x-ray dated 29 June 2021 HISTORY: ORDERING SYSTEM PROVIDED HISTORY: sob TECHNOLOGIST PROVIDED HISTORY: sob FINDINGS: Mild stable cardiomegaly with pulmonary vascular congestion. No pneumothorax or pleural effusion. Stable cardiomegaly with mild pulmonary vascular congestion. CT CHEST PULMONARY EMBOLISM W CONTRAST    Result Date: 3/18/2022  EXAMINATION: CTA OF THE CHEST 3/18/2022 1:23 pm TECHNIQUE: CTA of the chest was performed after the administration of intravenous contrast.  Multiplanar reformatted images are provided for review. MIP images are provided for review. Dose modulation, iterative reconstruction, and/or weight based adjustment of the mA/kV was utilized to reduce the radiation dose to as low as reasonably achievable. COMPARISON: None HISTORY: ORDERING SYSTEM PROVIDED HISTORY: sedentary lifestyle, leg swelling, increased O2 TECHNOLOGIST PROVIDED HISTORY: sedentary lifestyle, leg swelling, increased O2 Decision Support Exception - unselect if not a suspected or confirmed emergency medical condition->Emergency Medical Condition (MA) Reason for Exam: sedentary lifestyle, leg swelling, increased O2 FINDINGS: Pulmonary Arteries: Pulmonary arteries are adequately opacified for evaluation. No evidence of intraluminal filling defect to suggest pulmonary embolism. Main pulmonary artery is normal in caliber. Mediastinum: No evidence of mediastinal lymphadenopathy.   Small reactive lymph nodes seen in the mediastinum and hilar regions. The heart and pericardium demonstrate no acute abnormality. There is no acute abnormality of the thoracic aorta. Lungs/pleura: Patchy ground-glass opacity and increased markings seen in the lower lung fields bilaterally concerning for bibasilar infiltrate. No pleural effusion or pneumothorax. No pulmonary mass or nodule. Patchy ground-glass opacity in the upper lung fields bilaterally. Upper Abdomen: Limited images of the upper abdomen are unremarkable. Soft Tissues/Bones: No acute bone or soft tissue abnormality. Degenerative changes seen within the spine with no chest wall abnormality. No evidence of pulmonary embolism. There is patchy ill-defined opacification lower lung fields bilaterally suggesting infiltrate with ground-glass opacity concerning for pneumonia as well in the upper lung fields. Reactive adenopathy throughout the mediastinum and hilar regions. RECOMMENDATIONS: Unavailable     VL DUP LOWER EXTREMITY VENOUS BILATERAL    Result Date: 3/19/2022    [facility]  Vascular Lower Extremities DVT Study Procedure   Patient Name  CARMEN       Date of Study           03/18/2022                [address]   Date of Birth 1975  Gender                  Female   Age           55 year(s)  Race                    Black   Room Number   2008        Height:                 65.98 inch, 167.6 cm   Corporate ID  [id number]    Weight:                 380 pounds, 172.4 kg  #   Patient Acct  [de-identified]   BSA:        2.63 m^2    BMI:       61.36 kg/m^2  #   MR #          [id number]     Sonographer             Birgit Marte   Accession #   [id number]  Interpreting Physician  Sonja Cunningham   Referring                 Referring Physician     Ashley Lee. Celester Dose, DO  Nurse  Practitioner  Procedure Type of Study:   Veins: Lower Extremities DVT Study, Venous Scan Lower Bilateral.  Indications for Study:Leg Swelling and Shortness of breath. Patient Status:ER. Technical Quality:Limited visualization. Limitation reason:bedside exam , body habitus, deep vessels, edema. Conclusions   Summary   No evidence of superficial or deep venous thrombosis in both lower  extremities. Signature   ----------------------------------------------------------------  Electronically signed by NIKKI Narvaez(Sonographer) on  03/18/2022 01:27 PM  ----------------------------------------------------------------   ----------------------------------------------------------------  Electronically signed by Roddy Na Reyes,Arthur(Interpreting  physician) on 03/19/2022 07:28 AM  ----------------------------------------------------------------  Findings:   Right Impression:                    Left Impression:  The common femoral, femoral,         The common femoral, femoral,  popliteal and tibial veins           popliteal and tibial veins  demonstrate normal compressibility   demonstrate normal compressibility  and augmentation. and augmentation. Normal compressibility of the great  Normal compressibility of the great  saphenous vein. saphenous vein. Normal compressibility of the small  Normal compressibility of the small  saphenous vein. saphenous vein. Limited visualization of the         Limited visualization of the  posterior tibial and peroneal veins. posterior tibial and peroneal veins. Risk Factors History +-------------------------------------------+----------+-------------------+ ! Diagnosis                                  ! Date      ! Comments           ! +-------------------------------------------+----------+-------------------+ ! Previous Scan                              !04/04/2008! WNL                ! +-------------------------------------------+----------+-------------------+ ! Chronic lung disease->COPD                 !          !                   ! +-------------------------------------------+----------+-------------------+ ! Previous Scan                              !09/22/2014! Bilateral WNL      ! +-------------------------------------------+----------+-------------------+ ! CHF                                        ! !                   ! +-------------------------------------------+----------+-------------------+   - The patient's risk factor(s) include: chronic lung disease, dyslipidemia     and arterial hypertension.   - The patient has a former tobacco history. Allergies   - Allergy:Aspirin(Drug). - Allergy:Sulfa(Drug). Velocities are measured in cm/s ; Diameters are measured in cm Right Lower Extremities DVT Study Measurements Right 2D Measurements +------------------------------------+----------+---------------+----------+ ! Location                            ! Visualized! Compressibility! Thrombosis! +------------------------------------+----------+---------------+----------+ ! Common Femoral                      !Yes       ! Yes            ! None      ! +------------------------------------+----------+---------------+----------+ ! Prox Femoral                        !Yes       ! Yes            ! None      ! +------------------------------------+----------+---------------+----------+ ! Mid Femoral                         !Partial   !Yes            ! None      ! +------------------------------------+----------+---------------+----------+ ! Dist Femoral                        !Partial   !Yes            ! None      ! +------------------------------------+----------+---------------+----------+ ! Popliteal                           !Yes       ! Yes            ! None      ! +------------------------------------+----------+---------------+----------+ ! Sapheno Femoral Junction            ! Yes       ! Yes            ! None      ! +------------------------------------+----------+---------------+----------+ ! PTV                                 ! Partial   !Yes !None      ! +------------------------------------+----------+---------------+----------+ ! Peroneal                            !Partial   !Yes            ! None      ! +------------------------------------+----------+---------------+----------+ ! Gastroc                             ! Yes       ! Yes            ! None      ! +------------------------------------+----------+---------------+----------+ ! GSV Thigh                           ! Yes       ! Yes            ! None      ! +------------------------------------+----------+---------------+----------+ ! GSV Knee                            ! Yes       ! Yes            ! None      ! +------------------------------------+----------+---------------+----------+ ! GSV Ankle                           ! Yes       ! Yes            ! None      ! +------------------------------------+----------+---------------+----------+ ! SSV                                 ! Yes       ! Yes            ! None      ! +------------------------------------+----------+---------------+----------+ Right Doppler Measurements +--------------------------+---------+------+------------------------------+ ! Location                  ! Signal   !Reflux! Reflux (msec)                 ! +--------------------------+---------+------+------------------------------+ ! Common Femoral            !Pulsatile!      !                              ! +--------------------------+---------+------+------------------------------+ ! Prox Femoral              !Pulsatile!      !                              ! +--------------------------+---------+------+------------------------------+ ! Popliteal                 !Pulsatile!      !                              ! +--------------------------+---------+------+------------------------------+ Left Lower Extremities DVT Study Measurements Left 2D Measurements +------------------------------------+----------+---------------+----------+ ! Location !Visualized! Compressibility! Thrombosis! +------------------------------------+----------+---------------+----------+ ! Common Femoral                      !Yes       ! Yes            ! None      ! +------------------------------------+----------+---------------+----------+ ! Prox Femoral                        !Yes       ! Yes            ! None      ! +------------------------------------+----------+---------------+----------+ ! Mid Femoral                         !Partial   !Yes            ! None      ! +------------------------------------+----------+---------------+----------+ ! Dist Femoral                        !Partial   !Yes            ! None      ! +------------------------------------+----------+---------------+----------+ ! Popliteal                           !Yes       ! Yes            ! None      ! +------------------------------------+----------+---------------+----------+ ! Sapheno Femoral Junction            ! Yes       ! Yes            ! None      ! +------------------------------------+----------+---------------+----------+ ! PTV                                 ! Partial   !Yes            ! None      ! +------------------------------------+----------+---------------+----------+ ! Peroneal                            !Partial   !Yes            ! None      ! +------------------------------------+----------+---------------+----------+ ! Gastroc                             ! Yes       ! Yes            ! None      ! +------------------------------------+----------+---------------+----------+ ! GSV Thigh                           ! Yes       ! Yes            ! None      ! +------------------------------------+----------+---------------+----------+ ! GSV Knee                            ! Yes       ! Yes            ! None      ! +------------------------------------+----------+---------------+----------+ ! GSV Ankle                           ! Yes       ! Yes            ! None      ! +------------------------------------+----------+---------------+----------+ ! SSV                                 ! Yes       ! Yes            ! None      ! +------------------------------------+----------+---------------+----------+ Left Doppler Measurements +--------------------------+---------+------+------------------------------+ ! Location                  ! Signal   !Reflux! Reflux (msec)                 ! +--------------------------+---------+------+------------------------------+ ! Common Femoral            !Pulsatile!      !                              ! +--------------------------+---------+------+------------------------------+ ! Prox Femoral              !Pulsatile!      !                              ! +--------------------------+---------+------+------------------------------+ ! Popliteal                 !Pulsatile!      !                              ! +--------------------------+---------+------+------------------------------+    FL MODIFIED BARIUM SWALLOW W VIDEO    Result Date: 3/19/2022  EXAMINATION: MODIFIED BARIUM SWALLOW WAS PERFORMED IN CONJUNCTION WITH SPEECH PATHOLOGY SERVICES TECHNIQUE: Fluoroscopic evaluation of the swallowing mechanism was performed using cineradiography with multiple consistency of barium product in conjunction with speech pathology services. FLUOROSCOPY DOSE AND TYPE OR TIME AND EXPOSURES: Fluoro time: 1.1 minute DAP: 22.570 mGy COMPARISON: None HISTORY: ORDERING SYSTEM PROVIDED HISTORY: h/o esophageal stricture per patient TECHNOLOGIST PROVIDED HISTORY: h/o esophageal stricture per patient 80-year-old female with history of esophageal stricture FINDINGS: No penetration or aspiration with the thin liquid and thick liquid substance by straw, pureed/pudding thick, soft solid and cookie solid substances. No penetration or aspiration with the above administered substances. Please see separate speech pathology report for full discussion of findings and recommendations. ASSESSMENT & PLAN     ASSESSMENT / PLAN:     Principal Problem:    CHF (congestive heart failure), NYHA class I, acute on chronic, combined ([facility] [address]  Active Problems:    Asthma    HTN (hypertension)    Acute respiratory failure with hypoxia and hypercapnia (HCC)    Morbid obesity ([facility] [address]    Pulmonary hypertension, moderate to severe (HCC)    Morbid obesity with BMI of 50.0-59.9, adult (HCC)    Obesity hypoventilation syndrome (HCC)    STEPH (obstructive sleep apnea)    Chronic respiratory failure (HCC)    Pulmonary hypertension (HCC)    Normocytic anemia    Hyperlipidemia    Cor pulmonale, chronic (HCC)  Resolved Problems:    * No resolved hospital problems. *      Acute on chronic hypoxic hypercapnic respiratory failure likely secondary to severe pulmonary hypertension, acute on chronic cor pulmonale with history of STEPH/OHS/morbid obesity, asthma/COPD, prior history of tracheostomy  -Continues to be on diuretics 40 IV twice daily, bronchodilators, pulmonology following  -Continues to be on high flow nasal cannula 50% FiO2 25 L, alternating with BiPAP  -Long-term plan to have trilogy ventilator, pending insurance/physician review approval.  -Currently waiting for LTAC placement    Concern for pneumonia-completed course of Levaquin this admission    Essential HTN continue current regimen    Anemia of chronic disease continue iron, folic acid, B27 supplementation    Diabetes mellitus type 2 -continue Lantus 20 nightly with sliding scale. Hypokalemia Potassium at 3.2 . Replaced. Follow up potassium level. DVT ppx: heparin  GI ppx: protonix   Diet: Regular    Patient has been seen by palliative care on 4/12/2022. Patient and family want everything to be done to the patient. CODE STATUS -full code. PT/OT/SW : On board. Discharge Planning:  Discharge to [facility].  assisting with transitional planning. Called  [phone] for peer to peer.      Hudson Gordon MD  Internal Medicine Resident, PGY-1  [address] [place], [place]  4/18/2022, 3:47 PM    Attestation and add on       I have discussed the care of Jodie Toth , including pertinent history and exam findings,      4/18/22    with the resident. I have seen and examined the patient and the key elements of all parts of the encounter have been performed by me . I agree with the assessment, plan and orders as documented by the resident. Principal Problem:    CHF (congestive heart failure), NYHA class I, acute on chronic, combined ([address]  Active Problems:    Asthma    HTN (hypertension)    Acute respiratory failure with hypoxia and hypercapnia (HCC)    Morbid obesity ([address]    Pulmonary hypertension, moderate to severe (HCC)    Morbid obesity with BMI of 50.0-59.9, adult (HCC)    Obesity hypoventilation syndrome (HCC)    STEPH (obstructive sleep apnea)    Chronic respiratory failure (HCC)    Pulmonary hypertension (HCC)    Normocytic anemia    Hyperlipidemia    Cor pulmonale, chronic (HCC)  Resolved Problems:    * No resolved hospital problems. *        ''''''''''       MD KENNETH Saucedo [address], [address] [place] [address].    Phone [phone]   Fax: [phone]  Answering Service: [phone]

## 2022-04-18 NOTE — PROGRESS NOTES
PULMONARY & CRITICAL CARE MEDICINE PROGRESS NOTE     Patient:  Jodie Toth  MRN: [id number]  Admit date: 2022  Primary Care Physician: Joaquin Slater DO  Consulting Physician: Dagoberto Cho MD  CODE Status: Full Code  LOS: 10     SUBJECTIVE     CHIEF COMPLAINT/REASON FOR INITIAL CONSULT: Acute on chronic respiratory failure    BRIEF HOSPITAL COURSE:  The patient is a 55 y.o. 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 Patient was discharged home recently on supplemental oxygen and insurance had not approved trilogy so she was sent home with her home CPAP. Patient was brought back to the hospital because she was having worsening shortness of breath. Unable to use her CPAP because of fatigue. On arrival EMS found that her saturation was in mid [de-identified]. She was placed on nonrebreather brought to the ER. Pulmonary has been consulted because of high flow requirement and hypoxia. Patient is laying in bed, she is on high flow oxygen 80%, using BiPAP at night. Her BNP was elevated. She is under going diuresis,   On Symbicort, Spiriva and albuterol. INTERVAL HISTORY:  22  Patient is currently on high flow nasal cannula O2 Flow Rate (L/min)  Av L/min  Min: 25 L/min  Max: 25 L/min, FiO2 50%  She was on BiPAP 16/10 overnight  Denies any new complaints  T-max is 98.9  White count is 8.7  Patient is on Levaquin since   She is also on Lasix and Aldactone  Urine output was -1.9 L yesterday    Review of Systems   Constitutional: Positive for fatigue. Negative for fever. HENT: Negative for voice change. Eyes: Negative for visual disturbance. Respiratory: Positive for shortness of breath. Gastrointestinal: Negative for abdominal pain, diarrhea and vomiting. Genitourinary: Negative for dysuria and urgency. Musculoskeletal: Negative for joint swelling.    Allergic/Immunologic: Negative for environmental allergies and immunocompromised state. Neurological: Positive for weakness. Hematological: Negative for adenopathy. Does not bruise/bleed easily. Psychiatric/Behavioral: Negative for behavioral problems. OBJECTIVE     PaO2/FiO2 RATIO:  No results for input(s): POCPO2 in the last 72 hours. FiO2 : 50 %     VITAL SIGNS:   LAST:  /65   Pulse 100   Temp 98.2 °F (36.8 °C) (Oral)   Resp 30   Ht 5' 6\" (1.676 m)   Wt (!) 343 lb (155.6 kg)   SpO2 90%   BMI 55.36 kg/m²   8-24 HR RANGE:  TEMP Temp  Av.4 °F (36.9 °C)  Min: 98.1 °F (36.7 °C)  Max: 98.9 °F (41.3 °C)   BP Systolic (61HWR), VNN:214 , Min:114 , DJF:746      Diastolic (84OFT), QTO:89, Min:65, Max:76     PULSE Pulse  Av.2  Min: 82  Max: 101   RR Resp  Av.3  Min: 15  Max: 30   O2 SAT SpO2  Av.1 %  Min: 90 %  Max: 94 %   OXYGEN DELIVERY O2 Flow Rate (L/min)  Av L/min  Min: 25 L/min  Max: 25 L/min         Physical Exam  Constitutional:       General: She is awake. Appearance: She is morbidly obese. She is ill-appearing. HENT:      Head: Normocephalic and atraumatic. Eyes:      General: No scleral icterus. Conjunctiva/sclera: Conjunctivae normal.      Pupils: Pupils are equal, round, and reactive to light. Cardiovascular:      Rate and Rhythm: Normal rate and regular rhythm. Heart sounds: No murmur heard. Pulmonary:      Effort: Prolonged expiration present. No accessory muscle usage or respiratory distress. Breath sounds: Decreased air movement present. Abdominal:      General: Bowel sounds are normal.      Palpations: Abdomen is soft. Tenderness: There is no abdominal tenderness. Musculoskeletal:      Cervical back: Neck supple. Right lower le+ Pitting Edema present. Left lower le+ Pitting Edema present. Neurological:      General: No focal deficit present. Mental Status: She is alert.        DATA REVIEW     Medications: Current Inpatient  Scheduled Meds:   lactobacillus 1 capsule Oral Daily with breakfast    furosemide  40 mg IntraVENous BID    lidocaine  1 patch TransDERmal Daily    insulin glargine  20 Units SubCUTAneous Nightly    insulin lispro  0-18 Units SubCUTAneous TID WC    insulin lispro  0-9 Units SubCUTAneous Nightly    sertraline  100 mg Oral Daily    albuterol  2.5 mg Nebulization 4x daily    amLODIPine  5 mg Oral Daily    atorvastatin  40 mg Oral Nightly    budesonide-formoterol  2 puff Inhalation BID    ferrous sulfate  325 mg Oral BID WC    folic acid  1 mg Oral Daily    hydrALAZINE  25 mg Oral 3 times per day    isosorbide dinitrate  20 mg Oral TID    pantoprazole  40 mg Oral QAM AC    spironolactone  25 mg Oral Daily    tiotropium  2 puff Inhalation Daily    vitamin B-12  1,000 mcg Oral Daily    sodium chloride flush  10 mL IntraVENous 2 times per day    heparin (porcine)  5,000 Units SubCUTAneous 3 times per day    gabapentin  300 mg Oral TID     Continuous Infusions:   sodium chloride Stopped (04/09/22 0636)    dextrose         INPUT/OUTPUT:  In: 1000 [P.O.:1000]  Out: 1900 [Urine:1900]  Date 04/18/22 0000 - 04/18/22 2359   Shift 6619-4341 3781-6663 4168-2905 24 Hour Total   INTAKE   P.O.(mL/kg/hr)  1000  1000   Shift Total(mL/kg)  1000(6.4)  1000(6.4)   OUTPUT   Shift Total(mL/kg)       Weight (kg) 155. 6 155.6 155.6 155.6        LABS:  ABGs:   No results for input(s): POCPH, POCPCO2, POCPO2, POCHCO3, YHLQ2YHR in the last 72 hours. CBC:   Recent Labs     04/16/22  0630   WBC 8.7   HGB 10.4*   HCT 35.6*   MCV 88.8      LYMPHOPCT 18*   RBC 4.01   MCH 25.9   MCHC 29.2   RDW 16.4*     CRP:   No results for input(s): CRP in the last 72 hours. LDH:   No results for input(s): LDH in the last 72 hours.   BMP:   Recent Labs     04/16/22  0630 04/18/22  0544    135   K 3.6* 3.2*   CL 88* 86*   CO2 41* 35*   BUN 28* 21*   CREATININE 1.05* 1.12*   GLUCOSE 125* 119*     Liver Function Test:   No results for input(s): PROT, LABALBU, ALT, AST, GGT, ALKPHOS, BILITOT in the last 72 hours. Coagulation Profile:   No results for input(s): INR, PROTIME, APTT in the last 72 hours. D-Dimer:  No results for input(s): DDIMER in the last 72 hours. Lactic Acid:  No results for input(s): LACTA in the last 72 hours. Cardiac Enzymes:  No results for input(s): CKTOTAL, CKMB, CKMBINDEX, TROPONINI in the last 72 hours. Invalid input(s): TROPONIN, HSTROP  BNP/ProBNP:   No results for input(s): BNP, PROBNP in the last 72 hours. Triglycerides:  No results for input(s): TRIG in the last 72 hours. Microbiology:  Urine Culture:  No components found for: CURINE  Blood Culture:  No components found for: CBLOOD, CFUNGUSBL  Sputum Culture:  No components found for: CSPUTUM  No results for input(s): SPECDESC, SPECIAL, CULTURE, STATUS, ORG, CDIFFTOXPCR, CAMPYLOBPCR, SALMONELLAPC, SHIGAPCR, SHIGELLAPCR, MPNEUG, MPNEUM, LACTOQL in the last 72 hours. No results for input(s): SPUTUM, SPECDESC, SPECIAL, CULTURE, STATUS, ORG, CDIFFTOXPCR, MPNEUM, MPNEUG in the last 72 hours. Invalid input(s): Sarai Doty, CFUNGUSBL     Pathology:    Radiology Reports:  XR CHEST PORTABLE   Final Result   *Stable cardiomegaly with mild central vascular congestion. *Interval increased right perihilar/infrahilar opacity which may represent   developing pneumonia. XR CHEST PORTABLE   Final Result   Limited negative portable chest radiograph. Echocardiogram:   Results for orders placed during the hospital encounter of 03/18/22    ECHO Complete 2D W Doppler W Color    Narrative    Summary  Left ventricle is normal in size. Global left ventricular systolic function is normal. Calculated ejection  fraction 60% by Heart Model. Marked Leftward compression of inter-ventricular septum (\"D-sign\")  indicating RV volume or pressure overload. Right ventricular function appears reduced. Moderately dilated right  ventricular cavity.   Moderate to severe tricuspid regurgitation. Severe pulmonary hypertension. Estimated right ventricular systolic pressure  is 96AEDU. Trivial pulmonic insufficiency. ASSESSMENT AND PLAN     Assessment:    // Acute on chronic hypoxic/hypercapnic respiratory failure  // Chronic obstructive pulmonary disease  // Diastolic CHF  // Severe pulmonary hypertension  // Chronic cor pulmonale  // Morbid obesity  // Obstructive sleep apnea  // Obesity hypoventilation syndrome    Plan:    I personally interviewed/examined the patient; reviewed interval history, interpreted all available radiographic and laboratory data at the time of service.  Patient is hemodynamically stable and is currently saturating well on high flow nasal cannula   Continue supplemental oxygen to keep oxygen saturation >90%   Continue nocturnal and as needed BiPAP   She will need home NIV (trilogy) at the time of discharge   Encourage incentive spirometry/Acapella   Continue pulmonary toilet, aspiration precautions and bronchodilators   Continue diuresis with Lasix/Aldactone   Monitor I/O, electrolytes with a goal of even/negative fluid balance   Tolerating oral diet   Stress ulcer prophylaxis   Chemical DVT prophylaxis; heparin   Antimicrobials reviewed; patient is on Levaquin (day 7), will recommend discontinuing   Completed prednisone taper   Physical/occupational therapy   Overall prognosis guarded   She will likely need LTAC placement    I would like to thank you for allowing me to participate in the care of this patient. Please feel free to call with any further questions or concerns. Arina Mcdonald MD  Pulmonary and Critical Care Medicine           4/18/2022, 1:07 PM    Please note that this chart was generated using voice recognition Dragon dictation software. Although every effort was made to ensure the accuracy of this automated transcription, some errors in transcription may have occurred.

## 2022-04-19 PROBLEM — R19.7 DIARRHEA: Status: ACTIVE | Noted: 2022-01-01

## 2022-04-19 NOTE — PROGRESS NOTES
[facility]  Internal Medicine Teaching Residency Program  Inpatient Daily Progress Note  ______________________________________________________________________________    Patient: Fariba Anderson  YOB: 1975   FSO:[id number]    Acct: [de-identified]     Room: 2004/2004-01  Admit date: 4/8/2022  Today's date: 04/19/22  Number of days in the hospital: 11    SUBJECTIVE   Admitting Diagnosis: CHF (congestive heart failure), NYHA class I, acute on chronic, combined ([facility] [address]  CC: SOB  Pt examined at bedside. Chart & results reviewed. No acute events overnight. Patient states she is still has nausea and nonbloody emesis with no abdominal pain. Patient continues to be on high flow 25 L oxygen with 50% FiO2 along with BiPAP at night. Vitals and labs reviewed. Hypokalemia resolved. ROS:  Constitutional:  negative for chills, fevers, sweats  Respiratory:  negative for cough, wheezing, hemoptysis  Cardiovascular: lower extremity edema, palpitations  Gastrointestinal:  negative for abdominal pain, constipation, diarrhea, nausea, vomiting  Neurological:  negative for dizziness, headache    BRIEF HISTORY     The patient is a pleasant 55 y.o. 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 Patient states she has had increased shortness of breath the past 2 nights, unable to wear her CPAP nightly with increased fatigue. Patient is poor historian, states she wears 7 L O2 at home, however EMS on arrival saw patient on 4 L and was saturating in the mid 80s. Patient was placed on nonrebreather by EMS and saturations improved to 98%.     Of note, patient had previous hospitalization earlier this month for COPD exacerbation complicated by pneumonia. At that time patient was treated with antibiotics and steroids.   Pulmonology was consulted and patient was approved for trilogy noninvasive ventilator which she has yet to receive. Echocardiogram at that time showed EF 60%, \"D\" sign indicating RV pressure overload, moderate to severe MR, severe pulmonary hypertension. Patient is aware of these findings.     On my evaluation, patient resting comfortably on 15 L nonrebreather mask acute distress. Patient is morbidly obese with BMI 57. Denies any cough, chest pain, leg pain/swelling. She does have skin irritation on her buttocks. States she has been compliant with all meds (inculding bronchodilators and bumex 2mg BID) except for her PO DM meds as those were held on previous admission. Afebrile, hemodynamic stable, tachycardic in the 110s at present. Patient received prednisone and lasix 40mg IV in ED as well as rocephin and vancomycin. CXR in ED - limited negative CXR d/t body habitus.     Significant labs include proBNP on admission 4192, was in 1000s on recent discharge. Leukocytosis of 17.0. Hemoglobin 9.7. Slight bump in creatinine from baseline- 1.21. glucose 238.       4/10/2022-episode of hypoxia, patient drank 3.5 L of fluid admitted with CHF exacerbation, getting a chest x-ray, IV diuretics to continue, labs de-escalate, prednisone discontinued. 2022 - Patient has drank 2.5 L in the last 24 hours. Admitted with CHF exacerbation, currently on IV Lasix 80 mg IV twice daily. Did receive this morning. We will get a chest x-ray to look for any worsening pulmonary edema. Mild edema bilateral lower extremities noted. Labs Descalated, prednisone discontinued    2022 - Overnight BG went up to 423 and then insulin Lantus was increased to 20 nightly.     2022 -Lasix changed from 80 mg to 40 mg IV twice daily       OBJECTIVE     Vital Signs:  /74   Pulse 85   Temp 98.8 °F (37.1 °C) (Axillary)   Resp 21   Ht 5' 6\" (1.676 m)   Wt (!) 336 lb 6.8 oz (152.6 kg)   SpO2 94%   BMI 54.30 kg/m²     Temp (24hrs), Av.7 °F (37.1 °C), Min:98.2 °F (36.8 °C), Max:99.1 °F (37.3 °C)    In: 1550 Out: 1250 [Urine:1250]    Physical Exam:  Constitutional: This is a well developed, well nourished, Greater than 40 - Morbid Obesity / Extreme Obesity / Grade III 55y.o. year old female who is alert, oriented, cooperative and in no apparent distress. Head:normocephalic and atraumatic. On high flow nasal cannula  EENT:  PERRLA. No conjunctival injections. Septum was midline, mucosa was without erythema, exudates or cobblestoning. No thrush was noted. Neck: Supple without thyromegaly. No elevated JVP. Trachea was midline. Respiratory: Breath sounds clear to auscultation bilaterally. No crackles, wheezes or rales. Cardiovascular: S1-S2 heard which are regular without murmur, clicks, gallops or rubs. Abdomen: Abdomen distended, bowel sounds present. No organomegaly. Lymphatic: No lymphadenopathy. Musculoskeletal: Normal curvature of the spine. No gross muscle weakness. Extremities: Bilateral lower extremity edema present. Skin:  Warm and dry. Good color, turgor and pigmentation. No lesions or scars.   No cyanosis or clubbing  Neurological/Psychiatric: The patient's general behavior, level of consciousness, thought content and emotional status is normal.        Medications:  Scheduled Medications:    lactobacillus  1 capsule Oral Daily with breakfast    furosemide  40 mg IntraVENous BID    lidocaine  1 patch TransDERmal Daily    insulin glargine  20 Units SubCUTAneous Nightly    insulin lispro  0-18 Units SubCUTAneous TID WC    insulin lispro  0-9 Units SubCUTAneous Nightly    sertraline  100 mg Oral Daily    albuterol  2.5 mg Nebulization 4x daily    amLODIPine  5 mg Oral Daily    atorvastatin  40 mg Oral Nightly    budesonide-formoterol  2 puff Inhalation BID    ferrous sulfate  325 mg Oral BID WC    folic acid  1 mg Oral Daily    hydrALAZINE  25 mg Oral 3 times per day    isosorbide dinitrate  20 mg Oral TID    pantoprazole  40 mg Oral QAM AC    spironolactone  25 mg Oral Daily  tiotropium  2 puff Inhalation Daily    vitamin B-12  1,000 mcg Oral Daily    sodium chloride flush  10 mL IntraVENous 2 times per day    heparin (porcine)  5,000 Units SubCUTAneous 3 times per day    gabapentin  300 mg Oral TID     Continuous Infusions:    sodium chloride Stopped (04/09/22 0636)    dextrose       PRN Medicationscalcium carbonate, 500 mg, TID PRN  oxyCODONE-acetaminophen, 1 tablet, Q6H PRN  acetaminophen, 650 mg, Q4H PRN  sodium chloride, 1 spray, PRN  albuterol sulfate HFA, 2 puff, Q6H PRN  diazePAM, 5 mg, Q12H PRN  sodium chloride flush, 10 mL, PRN  sodium chloride, , PRN  magnesium sulfate, 1,000 mg, PRN  ondansetron, 4 mg, Q8H PRN   Or  ondansetron, 4 mg, Q6H PRN  polyethylene glycol, 17 g, Daily PRN  glucose, 15 g, PRN  dextrose, 12.5 g, PRN  glucagon (rDNA), 1 mg, PRN  dextrose, 100 mL/hr, PRN  albuterol, 2.5 mg, As Directed RT PRN        Diagnostic Labs:  CBC:   No results for input(s): WBC, RBC, HGB, HCT, MCV, RDW, PLT in the last 72 hours. BMP:   Recent Labs     04/18/22  0544 04/18/22  1546 04/19/22  0428     --  141   K 3.2* 3.7 3.7   CL 86*  --  92*   CO2 35*  --  33*   BUN 21*  --  21*   CREATININE 1.12*  --  1.13*     BNP: No results for input(s): BNP in the last 72 hours. PT/INR: No results for input(s): PROTIME, INR in the last 72 hours. APTT: No results for input(s): APTT in the last 72 hours. CARDIAC ENZYMES: No results for input(s): CKMB, CKMBINDEX, TROPONINI in the last 72 hours. Invalid input(s): CKTOTAL;3  FASTING LIPID PANEL:  Lab Results   Component Value Date    CHOL 144 11/17/2018    HDL 42 10/07/2020    TRIG 68 11/17/2018     LIVER PROFILE:   No results for input(s): AST, ALT, ALB, BILIDIR, BILITOT, ALKPHOS in the last 72 hours. MICROBIOLOGY:   Lab Results   Component Value Date/Time    CULTURE NO GROWTH 5 DAYS 04/09/2022 02:22 AM       Imaging:    XR CHEST PORTABLE    Result Date: 4/8/2022  Limited negative portable chest radiograph.      ECHO Complete 2D W Doppler W Color    Result Date: 3/22/2022  Transthoracic Echocardiography Report (TTE)  Patient Name PRICE       Date of Study               03/22/2022               Marian Bernal   Date of      1975  Gender                      Female  Birth   Age          55 year(s)  Race                        Black   Room Number  2008        Height:                     65.98 inch, 167.6 cm   Corporate ID [id number]    Weight:                     380 pounds, 172.4 kg  #   Patient Acct [de-identified]   BSA:          2.63 m^2      BMI:      61.36  #                                                              kg/m^2   MR #         [id number]     Sonographer                 Anisa Dunbar   Accession #  [id number]  Interpreting Physician      [address] [place] [address]   Fellow                   Referring Nurse                           Practitioner   Interpreting             Referring Physician         [facility]  Fellow  Additional Comments Technically difficult study. Type of Study   TTE procedure:2D Echocardiogram, M-Mode, Doppler, Color Doppler. Procedure Date Date: 03/22/2022 Start: 03:37 PM Study Location: [facility] Technical Quality: Adequate visualization Indications:Dyspnea/SOB. History / Tech. Comments: Echo done at patient bedside. Procedure explained to patient. HTN, COPD, PHTN, STEPH, HX NSTEMI Patient Status: Inpatient Height: 65.98 inches Weight: 380 pounds BSA: 2.63 m^2 BMI: 61.36 kg/m^2 Allergies   - Aspirin.   - Sulfa. CONCLUSIONS Summary Left ventricle is normal in size. Global left ventricular systolic function is normal. Calculated ejection fraction 60% by Heart Model. Marked Leftward compression of inter-ventricular septum (\"D-sign\") indicating RV volume or pressure overload. Right ventricular function appears reduced. Moderately dilated right ventricular cavity. Moderate to severe tricuspid regurgitation. Severe pulmonary hypertension. Estimated right ventricular systolic pressure is [id number]. Trivial pulmonic insufficiency. Signature ----------------------------------------------------------------------------  Electronically signed by Juliet Hinkle(Sonographer) on 2022  04:14 PM ---------------------------------------------------------------------------- ----------------------------------------------------------------------------  Electronically signed by Don Ellington(Interpreting physician) on 2022  11:48 AM ---------------------------------------------------------------------------- FINDINGS Left Atrium Left atrium is normal in size. Left Ventricle Left ventricle is normal in size. Global left ventricular systolic function is normal. Calculated ejection fraction 60% by Heart Model. Marked Leftward compression of inter-ventricular septum (\"D-sign\") indicating RV volume or pressure overload. Right Atrium Right atrium is normal in size. Right Ventricle Right ventricular function appears reduced. Moderately dilated right ventricular cavity. Mitral Valve Normal mitral valve structure and function. No mitral regurgitation. Aortic Valve Aortic valve is trileaflet and opens adequately. No aortic insufficiency. Tricuspid Valve No obvious valvular abnormality. Moderate to severe tricuspid regurgitation. Severe pulmonary hypertension. Estimated right ventricular systolic pressure is 19PEUJ. Pulmonic Valve The pulmonic valve is normal in structure. Trivial pulmonic insufficiency. Pericardial Effusion No pericardial effusion seen. Miscellaneous E/E' average = 7.1. IVC normal diameter & inspiratory collapse indicating normal RA filling pressure .  M-mode / 2D Measurements & Calculations:   LVIDd:3.4 cm(3.7 - 5.6 cm)       Diastolic WGDEAU:76.3 ml  IEBYK:1.1 cm(2.2 - 4.0 cm)       Systolic ZGWNJE:48.4 ml  ZWBN:6.8 cm(0.6 - 1.1 cm)        Aortic Root:2.8 cm(2.0 - 3.7 cm)  LVPWd:1 cm(0.6 - 1.1 cm)         LA Dimension: 2.4 cm(1.9 - 4.0 cm)  Fractional Shortenin.24 %    LA volume/Index: 47.93 ml /18m^2 Calculated LVEF (%): 60.84 %     LVOT:1.7 cm                                   RVDd:4.91 cm   Mitral:                                 Aortic   Valve Area (P1/2-Time): 3.86 cm^2       Peak Velocity: 1.66 m/s  Peak E-Wave: 0.75 m/s                   Mean Velocity: 1.04 m/s  Peak A-Wave: 0.60 m/s                   Peak Gradient: 11.02 mmHg  E/A Ratio: 1.25                         Mean Gradient: 5 mmHg  Peak Gradient: 2.25 mmHg  Mean Gradient: 2 mmHg  Deceleration Time: 195 msec             Area (continuity): 1.37 cm^2  P1/2t: 57 msec                          AV VTI: 29.9 cm   Area (continuity): 1.96 cm^2  Mean Velocity: 0.75 m/s   Tricuspid:                              Pulmonic:   Peak TR Velocity: 4.34 m/s              Peak Velocity: 1.22 m/s  Peak TR Gradient: 75.3424 mmHg          Peak Gradient: 5.95 mmHg  Diastology / Tissue Doppler Septal Wall E' velocity:0.10 m/s Septal Wall E/E':7.7 Lateral Wall E' velocity:0.11 m/s Lateral Wall E/E':6.6    XR ABDOMEN (KUB) (SINGLE AP VIEW)    Result Date: 4/1/2022  EXAMINATION: ONE SUPINE XRAY VIEW(S) OF THE ABDOMEN 4/1/2022 12:45 pm COMPARISON: CT dated 05/08/2019. HISTORY: ORDERING SYSTEM PROVIDED HISTORY: Constipation, right lower abdominal  pain TECHNOLOGIST PROVIDED HISTORY: Constipation, right lower abdominal  pain Reason for Exam: supine FINDINGS: Nonspecific bowel gas pattern is seen with gaseous distension of the stomach. Mild-to-moderate amount of stool is noted in the colon. Evaluation of free air is limited on this supine view. There appears to be Trujillo catheter in place. Nonspecific bowel gas pattern with mild-to-moderate amount of stool noted in the colon. Gaseous distention of the stomach. XR CHEST PORTABLE    Result Date: 4/10/2022  EXAMINATION: ONE XRAY VIEW OF THE CHEST 4/10/2022 9:12 am COMPARISON: 04/08/2022 HISTORY: Reason for Exam: Acute hypoxia admitted with Pul edema FINDINGS: Stable cardiomegaly. Mild central vascular congestion.   Interval increased right perihilar/infrahilar opacity. No sizable effusion or discernible pneumothorax. Osseous structures grossly intact. *Stable cardiomegaly with mild central vascular congestion. *Interval increased right perihilar/infrahilar opacity which may represent developing pneumonia. XR CHEST PORTABLE    Result Date: 4/8/2022  EXAMINATION: ONE XRAY VIEW OF THE CHEST 4/8/2022 4:37 pm COMPARISON: 03/22/2022 HISTORY: ORDERING SYSTEM PROVIDED HISTORY: Shortness of breath, COPD and CHF, recent admission for pneumonia TECHNOLOGIST PROVIDED HISTORY: Shortness of breath, COPD and CHF, recent admission for pneumonia Reason for Exam: upr,sob, FINDINGS: Examination is limited by the patient's body habitus and by portable technique. Cardial pericardial silhouette is prominent but stable. There are low lung volumes is secondary bronchovascular crowding. No focal infiltrate is identified. No pneumothorax. No free air. No acute bony abnormality. Limited negative portable chest radiograph. XR CHEST PORTABLE    Result Date: 3/22/2022  EXAMINATION: ONE XRAY VIEW OF THE CHEST 3/22/2022 9:30 am COMPARISON: 03/19/2022 HISTORY: ORDERING SYSTEM PROVIDED HISTORY: improvement in pnuemonia TECHNOLOGIST PROVIDED HISTORY: improvement in pnuemonia Reason for Exam: ap uprt port chest FINDINGS: As compared to prior examination, there has been significant improvement in the right lower lobe infiltrate. Lungs appear clear. There is cardiomegaly noted. Bony structures unremarkable. Improvement in the the right basal infiltrate. XR CHEST PORTABLE    Result Date: 3/19/2022  EXAMINATION: ONE XRAY VIEW OF THE CHEST 3/19/2022 12:51 am COMPARISON: CT PA performed approximately 10 hours earlier. Portable chest obtained approximately 12 hours earlier.  HISTORY: ORDERING SYSTEM PROVIDED HISTORY: Increasing O2 requirment TECHNOLOGIST PROVIDED HISTORY: Increasing O2 requirment Reason for Exam: shortness of breath, chest pain off and on   upright port FINDINGS: Mild cardiomegaly and normal pulmonary vasculature. Right worse than left bibasilar patchy airspace opacities which appear worse than exam 12 hours earlier. No pneumothorax or pleural effusion. Surrounding structures are unremarkable. Findings suggestive of worsening bibasilar pneumonia. XR CHEST PORTABLE    Result Date: 3/18/2022  EXAMINATION: ONE XRAY VIEW OF THE CHEST 3/18/2022 11:16 am COMPARISON: Chest x-ray dated 29 June 2021 HISTORY: ORDERING SYSTEM PROVIDED HISTORY: sob TECHNOLOGIST PROVIDED HISTORY: sob FINDINGS: Mild stable cardiomegaly with pulmonary vascular congestion. No pneumothorax or pleural effusion. Stable cardiomegaly with mild pulmonary vascular congestion. CT CHEST PULMONARY EMBOLISM W CONTRAST    Result Date: 3/18/2022  EXAMINATION: CTA OF THE CHEST 3/18/2022 1:23 pm TECHNIQUE: CTA of the chest was performed after the administration of intravenous contrast.  Multiplanar reformatted images are provided for review. MIP images are provided for review. Dose modulation, iterative reconstruction, and/or weight based adjustment of the mA/kV was utilized to reduce the radiation dose to as low as reasonably achievable. COMPARISON: None HISTORY: ORDERING SYSTEM PROVIDED HISTORY: sedentary lifestyle, leg swelling, increased O2 TECHNOLOGIST PROVIDED HISTORY: sedentary lifestyle, leg swelling, increased O2 Decision Support Exception - unselect if not a suspected or confirmed emergency medical condition->Emergency Medical Condition (MA) Reason for Exam: sedentary lifestyle, leg swelling, increased O2 FINDINGS: Pulmonary Arteries: Pulmonary arteries are adequately opacified for evaluation. No evidence of intraluminal filling defect to suggest pulmonary embolism. Main pulmonary artery is normal in caliber. Mediastinum: No evidence of mediastinal lymphadenopathy. Small reactive lymph nodes seen in the mediastinum and hilar regions.   The heart and pericardium demonstrate no acute abnormality. There is no acute abnormality of the thoracic aorta. Lungs/pleura: Patchy ground-glass opacity and increased markings seen in the lower lung fields bilaterally concerning for bibasilar infiltrate. No pleural effusion or pneumothorax. No pulmonary mass or nodule. Patchy ground-glass opacity in the upper lung fields bilaterally. Upper Abdomen: Limited images of the upper abdomen are unremarkable. Soft Tissues/Bones: No acute bone or soft tissue abnormality. Degenerative changes seen within the spine with no chest wall abnormality. No evidence of pulmonary embolism. There is patchy ill-defined opacification lower lung fields bilaterally suggesting infiltrate with ground-glass opacity concerning for pneumonia as well in the upper lung fields. Reactive adenopathy throughout the mediastinum and hilar regions. RECOMMENDATIONS: Unavailable     VL DUP LOWER EXTREMITY VENOUS BILATERAL    Result Date: 3/19/2022    [facility]  Vascular Lower Extremities DVT Study Procedure   Patient Name  CARMEN       Date of Study           03/18/2022                [address]    Date of Birth 1975  Gender                  Female   Age           55 year(s)  Race                    Black   Room Number   2008        Height:                 65.98 inch, 167.6 cm   Corporate ID  [id number]    Weight:                 380 pounds, 172.4 kg  #   Patient Acct  [de-identified]   BSA:        2.63 m^2    BMI:       61.36 kg/m^2  #   MR #          [id number]     Sonographer             Shabbir Turcios   Accession #   [id number]  Interpreting Physician  Caleb Middleton   Referring                 Referring Physician     Evelyn Oswald. Luis Lowe DO  Nurse  Practitioner  Procedure Type of Study:   Veins: Lower Extremities DVT Study, Venous Scan Lower Bilateral.  Indications for Study:Leg Swelling and Shortness of breath. Patient Status:ER. Technical Quality:Limited visualization.  Limitation reason:bedside exam , body habitus, deep vessels, edema. Conclusions   Summary   No evidence of superficial or deep venous thrombosis in both lower  extremities. Signature   ----------------------------------------------------------------  Electronically signed by NIKKI Altamirano(Sonographer) on  03/18/2022 01:27 PM  ----------------------------------------------------------------   ----------------------------------------------------------------  Electronically signed by Malissa Huntsman Reyes,Arthur(Interpreting  physician) on 03/19/2022 07:28 AM  ----------------------------------------------------------------  Findings:   Right Impression:                    Left Impression:  The common femoral, femoral,         The common femoral, femoral,  popliteal and tibial veins           popliteal and tibial veins  demonstrate normal compressibility   demonstrate normal compressibility  and augmentation. and augmentation. Normal compressibility of the great  Normal compressibility of the great  saphenous vein. saphenous vein. Normal compressibility of the small  Normal compressibility of the small  saphenous vein. saphenous vein. Limited visualization of the         Limited visualization of the  posterior tibial and peroneal veins. posterior tibial and peroneal veins. Risk Factors History +-------------------------------------------+----------+-------------------+ ! Diagnosis                                  ! Date      ! Comments           ! +-------------------------------------------+----------+-------------------+ ! Previous Scan                              !04/04/2008! WNL                ! +-------------------------------------------+----------+-------------------+ ! Chronic lung disease->COPD                 !          !                   ! +-------------------------------------------+----------+-------------------+ ! Previous Scan !09/22/2014! Bilateral WNL      ! +-------------------------------------------+----------+-------------------+ ! CHF                                        ! !                   ! +-------------------------------------------+----------+-------------------+   - The patient's risk factor(s) include: chronic lung disease, dyslipidemia     and arterial hypertension.   - The patient has a former tobacco history. Allergies   - Allergy:Aspirin(Drug). - Allergy:Sulfa(Drug). Velocities are measured in cm/s ; Diameters are measured in cm Right Lower Extremities DVT Study Measurements Right 2D Measurements +------------------------------------+----------+---------------+----------+ ! Location                            ! Visualized! Compressibility! Thrombosis! +------------------------------------+----------+---------------+----------+ ! Common Femoral                      !Yes       ! Yes            ! None      ! +------------------------------------+----------+---------------+----------+ ! Prox Femoral                        !Yes       ! Yes            ! None      ! +------------------------------------+----------+---------------+----------+ ! Mid Femoral                         !Partial   !Yes            ! None      ! +------------------------------------+----------+---------------+----------+ ! Dist Femoral                        !Partial   !Yes            ! None      ! +------------------------------------+----------+---------------+----------+ ! Popliteal                           !Yes       ! Yes            ! None      ! +------------------------------------+----------+---------------+----------+ ! Sapheno Femoral Junction            ! Yes       ! Yes            ! None      ! +------------------------------------+----------+---------------+----------+ ! PTV                                 ! Partial   !Yes            ! None      ! +------------------------------------+----------+---------------+----------+ ! Peroneal !Partial   !Yes            ! None      ! +------------------------------------+----------+---------------+----------+ ! Gastroc                             ! Yes       ! Yes            ! None      ! +------------------------------------+----------+---------------+----------+ ! GSV Thigh                           ! Yes       ! Yes            ! None      ! +------------------------------------+----------+---------------+----------+ ! GSV Knee                            ! Yes       ! Yes            ! None      ! +------------------------------------+----------+---------------+----------+ ! GSV Ankle                           ! Yes       ! Yes            ! None      ! +------------------------------------+----------+---------------+----------+ ! SSV                                 ! Yes       ! Yes            ! None      ! +------------------------------------+----------+---------------+----------+ Right Doppler Measurements +--------------------------+---------+------+------------------------------+ ! Location                  ! Signal   !Reflux! Reflux (msec)                 ! +--------------------------+---------+------+------------------------------+ ! Common Femoral            !Pulsatile!      !                              ! +--------------------------+---------+------+------------------------------+ ! Prox Femoral              !Pulsatile!      !                              ! +--------------------------+---------+------+------------------------------+ ! Popliteal                 !Pulsatile!      !                              ! +--------------------------+---------+------+------------------------------+ Left Lower Extremities DVT Study Measurements Left 2D Measurements +------------------------------------+----------+---------------+----------+ ! Location                            ! Visualized! Compressibility! Thrombosis! +------------------------------------+----------+---------------+----------+ ! Common Femoral !Yes       !Yes            ! None      ! +------------------------------------+----------+---------------+----------+ ! Prox Femoral                        !Yes       ! Yes            ! None      ! +------------------------------------+----------+---------------+----------+ ! Mid Femoral                         !Partial   !Yes            ! None      ! +------------------------------------+----------+---------------+----------+ ! Dist Femoral                        !Partial   !Yes            ! None      ! +------------------------------------+----------+---------------+----------+ ! Popliteal                           !Yes       ! Yes            ! None      ! +------------------------------------+----------+---------------+----------+ ! Sapheno Femoral Junction            ! Yes       ! Yes            ! None      ! +------------------------------------+----------+---------------+----------+ ! PTV                                 ! Partial   !Yes            ! None      ! +------------------------------------+----------+---------------+----------+ ! Peroneal                            !Partial   !Yes            ! None      ! +------------------------------------+----------+---------------+----------+ ! Gastroc                             ! Yes       ! Yes            ! None      ! +------------------------------------+----------+---------------+----------+ ! GSV Thigh                           ! Yes       ! Yes            ! None      ! +------------------------------------+----------+---------------+----------+ ! GSV Knee                            ! Yes       ! Yes            ! None      ! +------------------------------------+----------+---------------+----------+ ! GSV Ankle                           ! Yes       ! Yes            ! None      ! +------------------------------------+----------+---------------+----------+ ! SSV                                 ! Yes       ! Yes            ! None      ! +------------------------------------+----------+---------------+----------+ Left Doppler Measurements +--------------------------+---------+------+------------------------------+ ! Location                  ! Signal   !Reflux! Reflux (msec)                 ! +--------------------------+---------+------+------------------------------+ ! Common Femoral            !Pulsatile!      !                              ! +--------------------------+---------+------+------------------------------+ ! Prox Femoral              !Pulsatile!      !                              ! +--------------------------+---------+------+------------------------------+ ! Popliteal                 !Pulsatile!      !                              ! +--------------------------+---------+------+------------------------------+    FL MODIFIED BARIUM SWALLOW W VIDEO    Result Date: 3/19/2022  EXAMINATION: MODIFIED BARIUM SWALLOW WAS PERFORMED IN CONJUNCTION WITH SPEECH PATHOLOGY SERVICES TECHNIQUE: Fluoroscopic evaluation of the swallowing mechanism was performed using cineradiography with multiple consistency of barium product in conjunction with speech pathology services. FLUOROSCOPY DOSE AND TYPE OR TIME AND EXPOSURES: Fluoro time: 1.1 minute DAP: 22.570 mGy COMPARISON: None HISTORY: ORDERING SYSTEM PROVIDED HISTORY: h/o esophageal stricture per patient TECHNOLOGIST PROVIDED HISTORY: h/o esophageal stricture per patient 79-year-old female with history of esophageal stricture FINDINGS: No penetration or aspiration with the thin liquid and thick liquid substance by straw, pureed/pudding thick, soft solid and cookie solid substances. No penetration or aspiration with the above administered substances. Please see separate speech pathology report for full discussion of findings and recommendations.          ASSESSMENT & PLAN     ASSESSMENT / PLAN:     Principal Problem:    CHF (congestive heart failure), NYHA class I, acute on chronic, combined ([facility] [address]  Active Problems:    Asthma    HTN (hypertension)    Acute respiratory failure with hypoxia and hypercapnia (HCC)    Morbid obesity ([facility] [address]    Pulmonary hypertension, moderate to severe (HCC)    Morbid obesity with BMI of 50.0-59.9, adult (HCC)    Obesity hypoventilation syndrome (HCC)    STEPH (obstructive sleep apnea)    Chronic respiratory failure (HCC)    Pulmonary hypertension (HCC)    Normocytic anemia    Hyperlipidemia    Cor pulmonale, chronic (HCC)  Resolved Problems:    * No resolved hospital problems. *      Acute on chronic hypoxic hypercapnic respiratory failure likely secondary to severe pulmonary hypertension, acute on chronic cor pulmonale with history of STEPH/OHS/morbid obesity, asthma/COPD, prior history of tracheostomy  -Continues to be on diuretics 40 IV twice daily, bronchodilators, pulmonology following  -Continues to be on high flow nasal cannula 50% FiO2 25 L, alternating with BiPAP  -Long-term plan to have trilogy ventilator, pending insurance/physician review approval.  -Patient rejected at [facility]. Will likely go to SNF. Concern for pneumonia-completed course of Levaquin this admission    Essential HTN continue current regimen    Anemia of chronic disease continue iron, folic acid, H88 supplementation    Diabetes mellitus type 2 -continue Lantus 20 nightly with sliding scale. Hypokalemia Potassium at 3.2 . Replaced. Follow up potassium level. DVT ppx: heparin  GI ppx: protonix   Diet: Regular    Patient has been seen by palliative care on 4/12/2022. Patient and family want everything to be done to the patient. CODE STATUS -full code. PT/OT/SW : On board. Discharge Planning:  Discharge to [facility] [place].  assisting with transitional planning. Called  [phone] for peer to peer. Patient denied in LTAC as she is being actively treated for heart failure. Patient can likely go to [facility] per Dr. Cherylene Gip.     Socrates Fernandez MD  Internal Medicine Resident, PGY-1  [facility] [address]; [place], [place]  4/19/2022, 8:20 AM      Attestation and add on       I have discussed the care of Deejay Gomez , including pertinent history and exam findings,      4/19/22    with the resident. I have seen and examined the patient and the key elements of all parts of the encounter have been performed by me . I agree with the assessment, plan and orders as documented by the resident. Principal Problem:    CHF (congestive heart failure), NYHA class I, acute on chronic, combined ([address]  Active Problems:    Asthma    HTN (hypertension)    Acute respiratory failure with hypoxia and hypercapnia (HCC)    Morbid obesity ([address]    Pulmonary hypertension, moderate to severe (HCC)    Morbid obesity with BMI of 50.0-59.9, adult (HCC)    Obesity hypoventilation syndrome (HCC)    STEPH (obstructive sleep apnea)    Chronic respiratory failure (HCC)    Pulmonary hypertension (HCC)    Normocytic anemia    Hyperlipidemia    Cor pulmonale, chronic (HCC)  Resolved Problems:    * No resolved hospital problems. *        ''''''''''       MD KENNETH Stewart [address], [address] [place] [address].    Phone [phone]   Fax: [phone]  Answering Service: [phone]

## 2022-04-19 NOTE — PLAN OF CARE
Received a call back from Dr. Ana Maria Howell, [facility] physician from peer to peer saying that they are not able to accept the patient to [facility] as patient is being actively treated for congestive heart failure.

## 2022-04-19 NOTE — PLAN OF CARE
Problem: Falls - Risk of:  Goal: Will remain free from falls  Description: Will remain free from falls  Outcome: Ongoing  Goal: Absence of physical injury  Description: Absence of physical injury  Outcome: Ongoing     Problem: Skin Integrity:  Goal: Will show no infection signs and symptoms  Description: Will show no infection signs and symptoms  Outcome: Ongoing  Goal: Absence of new skin breakdown  Description: Absence of new skin breakdown  Outcome: Ongoing     Problem: Nutrition  Goal: Optimal nutrition therapy  Outcome: Ongoing     Problem: Pain:  Goal: Pain level will decrease  Description: Pain level will decrease  Outcome: Ongoing  Goal: Control of acute pain  Description: Control of acute pain  Outcome: Ongoing  Goal: Control of chronic pain  Description: Control of chronic pain  Outcome: Ongoing     Problem: Discharge Planning:  Goal: Discharged to appropriate level of care  Description: Discharged to appropriate level of care  Outcome: Ongoing     Problem:  Activity:  Goal: Capacity to carry out activities will improve  Description: Capacity to carry out activities will improve  Outcome: Ongoing  Goal: Will verbalize the importance of balancing activity with adequate rest periods  Description: Will verbalize the importance of balancing activity with adequate rest periods  Outcome: Ongoing     Problem: Cardiac:  Goal: Hemodynamic stability will improve  Description: Hemodynamic stability will improve  Outcome: Ongoing  Goal: Ability to maintain an adequate cardiac output will improve  Description: Ability to maintain an adequate cardiac output will improve  Outcome: Ongoing     Problem: Coping:  Goal: Verbalizations of decreased anxiety will decrease  Description: Verbalizations of decreased anxiety will decrease  Outcome: Ongoing     Problem: Fluid Volume:  Goal: Risk for excess fluid volume will decrease  Description: Risk for excess fluid volume will decrease  Outcome: Ongoing  Goal: Maintenance of adequate hydration will improve  Description: Maintenance of adequate hydration will improve  Outcome: Ongoing  Goal: Will show no signs and symptoms of electrolyte imbalance  Description: Will show no signs and symptoms of electrolyte imbalance  Outcome: Ongoing     Problem: Health Behavior:  Goal: Ability to manage health-related needs will improve  Description: Ability to manage health-related needs will improve  Outcome: Ongoing  Goal: Ability to seek appropriate health care will improve  Description: Ability to seek appropriate health care will improve  Outcome: Ongoing     Problem: Nutritional:  Goal: Maintenance of adequate nutrition will improve  Description: Maintenance of adequate nutrition will improve  Outcome: Ongoing     Problem: Physical Regulation:  Goal: Complications related to the disease process, condition or treatment will be avoided or minimized  Description: Complications related to the disease process, condition or treatment will be avoided or minimized  Outcome: Ongoing     Problem: Respiratory:  Goal: Ability to maintain adequate ventilation will improve  Description: Ability to maintain adequate ventilation will improve  Outcome: Ongoing  Goal: Respiratory status will improve  Description: Respiratory status will improve  Outcome: Ongoing

## 2022-04-19 NOTE — CARE COORDINATION
Transitional planning:  PS Internal medicine and asked if peer to peer was set up, what time and date? Eunice Rooney MD replied and left on vm that insurance could call anytime of day today until tomorrow for P to P.     0741 Received vm from [id number] at [place] with Peer to peer information, same as yesterday. See yesterday's notes. 26 Reviewed Dr. Elaina Irving note from 1515 hrs. Received a call back from Dr. Shereen Marinelli, [facility] physician from peer to peer saying that they are not able to accept the patient to [facility] as patient is being actively treated for congestive heart failure. Contacted DI Traylor for next steps. [facility] called back. Vlad Diane at [place] and asked for 2nd appeal. He said he will start.

## 2022-04-19 NOTE — PROGRESS NOTES
Physical Therapy  Facility/Department: [facility] 2  Daily Treatment Note  NAME: Jeremy Herrera  : 1975  MRN: [id number]    Date of Service: 2022    Discharge Recommendations:  Patient would benefit from continued therapy after discharge   PT Equipment Recommendations  Equipment Needed: No  Other: pt currenlty requires significant physical assistance for all aspects of mobility    Assessment   Body structures, Functions, Activity limitations: Decreased functional mobility ; Decreased balance;Decreased ROM; Decreased strength;Decreased endurance; Increased pain  Assessment: Pt required Marychuy-modA  to perform bed mobility,  completed STS with ModA from EOB to bariatric RW and maintained standing ~ 2 mins prior to amb. Pt ambulated 6 steps forward and back with bariatric RW and Marychuy on high flow O2. Pt is currently unsafe to perform functional mobility unassisted and is expected to require continued skilled PT to address functional mobility and endurance deficits to return pt to prior level of function. Prognosis: Fair  PT Education: Goals;PT Role;General Safety; Functional Mobility Training;Home Exercise Program;Energy Conservation;Transfer Training  REQUIRES PT FOLLOW UP: Yes  Activity Tolerance  Activity Tolerance: Patient limited by fatigue;Patient limited by endurance  Activity Tolerance: on high flow O2     Patient Diagnosis(es): The encounter diagnosis was Acute on chronic congestive heart failure, unspecified heart failure type ([address]      has a past medical history of Acute on chronic diastolic CHF (congestive heart failure) ([address] HIEN (acute kidney injury) ([address] HIEN (acute kidney injury) ([address] Asthma, CHF, Chronic obstructive lung disease ([address] Chronic respiratory failure with hypoxia ([address] COPD, Diabetes mellitus, new onset ([address] Former smoker, HTN, Hyperglycemia, Hyperlipidemia, Hypertension, Morbid obesity with BMI of 60.0-69.9, adult ([address] Neuromuscular disorder ([address] STEPH on CPAP, Oxygen dependent, Pedal edema, Pneumonia, Pulmonary hypertension, moderate to severe ([address] Torn meniscus, and Wears glasses. has a past surgical history that includes  section (); Abdomen surgery; joint replacement; Cystoscopy (2019); Cystoscopy (N/A, 2019); hc cath power picc triple (2018); pr office/outpt visit,procedure only (N/A, 2018); Tracheotomy (2018); Gastrostomy tube placement (2018); and tracheostomy closure (2018). Restrictions  Restrictions/Precautions  Restrictions/Precautions: Fall Risk,Up as Tolerated,General Precautions  Required Braces or Orthoses?: No  Position Activity Restriction  Other position/activity restrictions: up w/assist. Hi-yair O2 . Subjective   General  Response To Previous Treatment: Patient with no complaints from previous session. Family / Caregiver Present: No  Subjective  Subjective: Pt and RN agreeable to PT. Pt alert in bed upon arrival, on high flow O2 upon arrival, pleasant and cooperative throughout. General Comment  Comments: Pt left in bed with call light within reach  Pain Screening  Patient Currently in Pain: Denies  Vital Signs  Patient Currently in Pain: Denies       Orientation  Orientation  Overall Orientation Status: Within Normal Limits  Cognition      Objective   Bed mobility  Rolling to Left: Minimal assistance  Rolling to Right: Minimal assistance  Supine to Sit: Moderate assistance  Sit to Supine:  Moderate assistance;2 Person assistance  Scooting: Minimal assistance  Comment: multiple trials of rolling completed for activities of hygiene prior to mobility  Transfers  Sit to Stand: Minimal Assistance;2 Person Assistance  Stand to sit: Minimal Assistance;2 Person Assistance  Comment: pt completed 1 STS transfers with bariatric RW from EOB, modA x2 for return to supine d/t pt's short stature and body habitus  Ambulation  Ambulation?: Yes  Ambulation 1  Surface: level tile  Device: Rolling Walker (bariatric)  Assistance: Minimal assistance  Quality of Gait: waddling gait, short steps  Gait Deviations: Slow Marina; Increased NOEL; Decreased step length  Distance: 6 steps forward and back  Comments: pt very fatigued after amb, SpO2 >90% t/o mobility  Stairs/Curb  Stairs?: No     Balance  Posture: Fair  Sitting - Static: Fair;+  Sitting - Dynamic: Fair;+  Standing - Static: Fair  Standing - Dynamic: Fair  Comments: standing balance assessed with bariatric RW, pt able to maintain standing ~2 mins prior to amb  Exercises  Quad Sets: 10x bilat supine  Gluteal Sets: 10x bilat supine  Ankle Pumps: 10x bilat supine  Upper Extremity: pt to completed UE exs independently later today     AM-PAC Score  AM-PAC Inpatient Mobility Raw Score : 9 (04/15/22 1410)  AM-PAC Inpatient T-Scale Score : 30.55 (04/15/22 1410)  Mobility Inpatient CMS 0-100% Score: 81.38 (04/15/22 1410)  Mobility Inpatient CMS G-Code Modifier : CM (04/15/22 1410)    Goals  Short term goals  Time Frame for Short term goals: 14  Short term goal 1: Pt to perform bed mobility Marychuy  Short term goal 2: Pt to attempt functional transfers Marychuy  Short term goal 3: Demonstrate standing balance of good - to decrease fall risk  Short term goal 4: Actively participate in 30 minutes of therapy to demo increased endurance  Short term goal 5: Pt to ambulate 10ft w/ RW Marychuy  Patient Goals   Patient goals :  To get well    Plan    Plan  Times per week: 5x/week  Times per day: Daily  Current Treatment Recommendations: Strengthening,Home Exercise Program,ROM,Safety Education & Training,Balance Training,Patient/Caregiver Education & Training,Endurance Training,Functional Mobility Training,Transfer Training,Gait Training  Safety Devices  Type of devices: Call light within reach,Left in bed,Patient at risk for falls,Nurse notified  Restraints  Initially in place: No  Restraints: 4 bed rails per pt request     Therapy Time   Individual Concurrent Group Co-treatment   Time In 1520         Time Out 1545 Minutes 25         Timed Code Treatment Minutes: [address], [place]

## 2022-04-19 NOTE — PROGRESS NOTES
PULMONARY & CRITICAL CARE MEDICINE PROGRESS NOTE     Patient:  Koffi Means  MRN: [id number]  Admit date: 2022  Primary Care Physician: Pablo Prather DO  Consulting Physician: Neal Kim MD  CODE Status: Full Code  LOS: 11     SUBJECTIVE     CHIEF COMPLAINT/REASON FOR INITIAL CONSULT: Acute on chronic respiratory failure    BRIEF HOSPITAL COURSE:  The patient is a 55 y.o. 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 Patient was discharged home recently on supplemental oxygen and insurance had not approved trilogy so she was sent home with her home CPAP. Patient was brought back to the hospital because she was having worsening shortness of breath. Unable to use her CPAP because of fatigue. On arrival EMS found that her saturation was in mid [de-identified]. She was placed on nonrebreather brought to the ER. Pulmonary has been consulted because of high flow requirement and hypoxia. Patient is laying in bed, she is on high flow oxygen 80%, using BiPAP at night. Her BNP was elevated. She is under going diuresis,   On Symbicort, Spiriva and albuterol. INTERVAL HISTORY:  22  Patient is currently on high flow nasal cannula O2 Flow Rate (L/min)  Av L/min  Min: 25 L/min  Max: 25 L/min, FiO2 50%  She was on BiPAP /10 overnight  Denies any new complaints  Afebrile, hemodynamically stable  On diuresis with Lasix, urine output 1.25 L yesterday    REVIEW OF SYSTEMS:  Review of Systems   Constitutional: Positive for fatigue. Negative for fever. HENT: Negative for voice change. Eyes: Negative for discharge and visual disturbance. Respiratory: Positive for shortness of breath. Gastrointestinal: Negative for abdominal pain, diarrhea and vomiting. Genitourinary: Negative for dysuria and urgency. Musculoskeletal: Negative for joint swelling.    Allergic/Immunologic: Negative for environmental allergies and immunocompromised state.   Neurological: Positive for weakness. Hematological: Negative for adenopathy. Does not bruise/bleed easily. Psychiatric/Behavioral: Negative for behavioral problems. OBJECTIVE     PaO2/FiO2 RATIO:  No results for input(s): POCPO2 in the last 72 hours. FiO2 : 50 %     VITAL SIGNS:   LAST:  /64   Pulse 98   Temp 98.8 °F (37.1 °C) (Oral)   Resp 16   Ht 5' 6\" (1.676 m)   Wt (!) 336 lb 6.8 oz (152.6 kg)   SpO2 90%   BMI 54.30 kg/m²   8-24 HR RANGE:  TEMP Temp  Av.8 °F (37.1 °C)  Min: 98.2 °F (36.8 °C)  Max: 99.1 °F (75.5 °C)   BP Systolic (50QJK), FLK:996 , Min:102 , MRI:887      Diastolic (69APO), ORS:44, Min:60, Max:75     PULSE Pulse  Av.2  Min: 81  Max: 98   RR Resp  Av.3  Min: 13  Max: 21   O2 SAT SpO2  Av.3 %  Min: 90 %  Max: 97 %   OXYGEN DELIVERY O2 Flow Rate (L/min)  Av L/min  Min: 25 L/min  Max: 25 L/min         Physical Exam  Constitutional:       General: She is awake. Appearance: She is morbidly obese. She is ill-appearing. HENT:      Head: Normocephalic and atraumatic. Eyes:      General: No scleral icterus. Conjunctiva/sclera: Conjunctivae normal.      Pupils: Pupils are equal, round, and reactive to light. Cardiovascular:      Rate and Rhythm: Normal rate and regular rhythm. Heart sounds: No murmur heard. Pulmonary:      Effort: Prolonged expiration present. No accessory muscle usage or respiratory distress. Breath sounds: Decreased air movement present. Abdominal:      General: Bowel sounds are normal.      Palpations: Abdomen is soft. Tenderness: There is no abdominal tenderness. Musculoskeletal:      Cervical back: Neck supple. Right lower le+ Pitting Edema present. Left lower le+ Pitting Edema present. Neurological:      General: No focal deficit present. Mental Status: She is alert.        DATA REVIEW     Medications: Current Inpatient  Scheduled Meds:   lactobacillus  1 capsule Oral Daily with breakfast    furosemide  40 mg IntraVENous BID    lidocaine  1 patch TransDERmal Daily    insulin glargine  20 Units SubCUTAneous Nightly    insulin lispro  0-18 Units SubCUTAneous TID WC    insulin lispro  0-9 Units SubCUTAneous Nightly    sertraline  100 mg Oral Daily    albuterol  2.5 mg Nebulization 4x daily    amLODIPine  5 mg Oral Daily    atorvastatin  40 mg Oral Nightly    budesonide-formoterol  2 puff Inhalation BID    ferrous sulfate  325 mg Oral BID WC    folic acid  1 mg Oral Daily    hydrALAZINE  25 mg Oral 3 times per day    isosorbide dinitrate  20 mg Oral TID    pantoprazole  40 mg Oral QAM AC    spironolactone  25 mg Oral Daily    tiotropium  2 puff Inhalation Daily    vitamin B-12  1,000 mcg Oral Daily    sodium chloride flush  10 mL IntraVENous 2 times per day    heparin (porcine)  5,000 Units SubCUTAneous 3 times per day    gabapentin  300 mg Oral TID     Continuous Infusions:   sodium chloride Stopped (04/09/22 0636)    dextrose         INPUT/OUTPUT:  In: 1750 [P.O.:1750]  Out: 1250 [Urine:1250]  Date 04/19/22 0000 - 04/19/22 2359   Shift 9353-1259 1099-4719 5252-8696 24 Hour Total   INTAKE   P.O.(mL/kg/hr) 200(0.2) 200  400   Shift Total(mL/kg) 200(1.3) 200(1.3)  400(2.6)   OUTPUT   Urine(mL/kg/hr) 800(0.7)   800   Shift Total(mL/kg) 800(5.2)   800(5.2)   Weight (kg) 152.6 152.6 152.6 152.6        LABS:  ABGs:   No results for input(s): POCPH, POCPCO2, POCPO2, POCHCO3, JLNH1PZV in the last 72 hours. CBC:   No results for input(s): WBC, HGB, HCT, MCV, PLT, LABLYMP, MID, GRAN, LYMPHOPCT, MIDPERCENT, GRANULOCYTES, RBC, MCH, MCHC, RDW in the last 72 hours. Invalid input(s): RELATIVEPERCENT  CRP:   No results for input(s): CRP in the last 72 hours. LDH:   No results for input(s): LDH in the last 72 hours.   BMP:   Recent Labs     04/18/22  0544 04/18/22  1546 04/19/22  0428     --  141   K 3.2* 3.7 3.7   CL 86*  --  92*   CO2 35*  --  33*   BUN 21* --  21*   CREATININE 1.12*  --  1.13*   GLUCOSE 119*  --  112*     Liver Function Test:   No results for input(s): PROT, LABALBU, ALT, AST, GGT, ALKPHOS, BILITOT in the last 72 hours. Coagulation Profile:   No results for input(s): INR, PROTIME, APTT in the last 72 hours. D-Dimer:  No results for input(s): DDIMER in the last 72 hours. Lactic Acid:  No results for input(s): LACTA in the last 72 hours. Cardiac Enzymes:  No results for input(s): CKTOTAL, CKMB, CKMBINDEX, TROPONINI in the last 72 hours. Invalid input(s): TROPONIN, HSTROP  BNP/ProBNP:   No results for input(s): BNP, PROBNP in the last 72 hours. Triglycerides:  No results for input(s): TRIG in the last 72 hours. Microbiology:  Urine Culture:  No components found for: CURINE  Blood Culture:  No components found for: CBLOOD, CFUNGUSBL  Sputum Culture:  No components found for: CSPUTUM  No results for input(s): SPECDESC, SPECIAL, CULTURE, STATUS, ORG, CDIFFTOXPCR, CAMPYLOBPCR, SALMONELLAPC, SHIGAPCR, SHIGELLAPCR, MPNEUG, MPNEUM, LACTOQL in the last 72 hours. No results for input(s): SPUTUM, SPECDESC, SPECIAL, CULTURE, STATUS, ORG, CDIFFTOXPCR, MPNEUM, MPNEUG in the last 72 hours. Invalid input(s): Skylar Mins, CFUNGUSBL     Pathology:    Radiology Reports:  XR CHEST PORTABLE   Final Result   *Stable cardiomegaly with mild central vascular congestion. *Interval increased right perihilar/infrahilar opacity which may represent   developing pneumonia. XR CHEST PORTABLE   Final Result   Limited negative portable chest radiograph. Echocardiogram:   Results for orders placed during the hospital encounter of 03/18/22    ECHO Complete 2D W Doppler W Color    Narrative    Summary  Left ventricle is normal in size. Global left ventricular systolic function is normal. Calculated ejection  fraction 60% by Heart Model.   Marked Leftward compression of inter-ventricular septum (\"D-sign\")  indicating RV volume or pressure overload. Right ventricular function appears reduced. Moderately dilated right  ventricular cavity. Moderate to severe tricuspid regurgitation. Severe pulmonary hypertension. Estimated right ventricular systolic pressure  is [id number]. Trivial pulmonic insufficiency. ASSESSMENT AND PLAN     Assessment:    // Acute on chronic hypoxic/hypercapnic respiratory failure  // Chronic obstructive pulmonary disease  // Diastolic CHF  // Severe pulmonary hypertension  // Chronic cor pulmonale  // Morbid obesity  // Obstructive sleep apnea  // Obesity hypoventilation syndrome    Plan:    I personally interviewed/examined the patient; reviewed interval history, interpreted all available radiographic and laboratory data at the time of service.  Patient remains on high flow nasal cannula, FiO2 50%, flow rate 30 L/min   Continue supplemental oxygen to keep oxygen saturation >90%   Continue nocturnal and as needed BiPAP   She will need home NIV (trilogy) at the time of discharge   Encourage incentive spirometry/Acapella   Continue pulmonary toilet, aspiration precautions and bronchodilators   Continue diuresis with Lasix/Aldactone   Monitor I/O, electrolytes with a goal of even/negative fluid balance   Tolerating oral diet   Stress ulcer prophylaxis   Chemical DVT prophylaxis; heparin   Antimicrobials reviewed; patient is on Levaquin (day 7), will recommend discontinuing   Completed prednisone taper   Physical/occupational therapy   Overall prognosis guarded   Awaits placement    I would like to thank you for allowing me to participate in the care of this patient. Please feel free to call with any further questions or concerns. Kay De Los Santos MD  Pulmonary and Critical Care Medicine           4/19/2022, 3:55 PM    Please note that this chart was generated using voice recognition Dragon dictation software.   Although every effort was made to ensure the accuracy of this automated transcription, some errors in transcription may have occurred.

## 2022-04-20 NOTE — PROGRESS NOTES
[facility]  Internal Medicine Teaching Residency Program  Inpatient Daily Progress Note  ______________________________________________________________________________    Patient: Deejay Gomez  YOB: 1975   QBY:[id number]    Acct: [de-identified]     Room: 2004/2004-01  Admit date: 4/8/2022  Today's date: 04/20/22  Number of days in the hospital: 12    SUBJECTIVE   Admitting Diagnosis: CHF (congestive heart failure), NYHA class I, acute on chronic, combined ([facility] [address]  CC: SOB  Pt examined at bedside. Chart & results reviewed. No acute events overnight. Patient is afebrile and is hemodynamically stable. Patient reports she is still nauseated and has diarrhea. Stool studies not done yet. Patient is saturating 91% on heated high flow nasal cannula with 50-->60% FiO2 on 25 L/min flow rate. On diuresis with Lasix 40 Mg IV twice daily with urine output of 1.2 L. Awaiting SNF/LTAC placement. ROS:  Constitutional:  negative for chills, fevers, sweats  Respiratory:  negative for cough, wheezing, hemoptysis  Cardiovascular: lower extremity edema, palpitations  Gastrointestinal: Positive for diarrhea, negative for abdominal pain, constipation,  nausea, vomiting  Neurological:  negative for dizziness, headache    BRIEF HISTORY     The patient is a pleasant 55 y.o. 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 Patient states she has had increased shortness of breath the past 2 nights, unable to wear her CPAP nightly with increased fatigue. Patient is poor historian, states she wears 7 L O2 at home, however EMS on arrival saw patient on 4 L and was saturating in the mid 80s. Patient was placed on nonrebreather by EMS and saturations improved to 98%.     Of note, patient had previous hospitalization earlier this month for COPD exacerbation complicated by pneumonia.   At that time patient was treated with antibiotics and steroids. Pulmonology was consulted and patient was approved for trilogy noninvasive ventilator which she has yet to receive. Echocardiogram at that time showed EF 60%, \"D\" sign indicating RV pressure overload, moderate to severe MR, severe pulmonary hypertension. Patient is aware of these findings.     On my evaluation, patient resting comfortably on 15 L nonrebreather mask acute distress. Patient is morbidly obese with BMI 57. Denies any cough, chest pain, leg pain/swelling. She does have skin irritation on her buttocks. States she has been compliant with all meds (inculding bronchodilators and bumex 2mg BID) except for her PO DM meds as those were held on previous admission. Afebrile, hemodynamic stable, tachycardic in the 110s at present. Patient received prednisone and lasix 40mg IV in ED as well as rocephin and vancomycin. CXR in ED - limited negative CXR d/t body habitus.     Significant labs include proBNP on admission 4192, was in 1000s on recent discharge. Leukocytosis of 17.0. Hemoglobin 9.7. Slight bump in creatinine from baseline- 1.21. glucose 238.       4/10/2022-episode of hypoxia, patient drank 3.5 L of fluid admitted with CHF exacerbation, getting a chest x-ray, IV diuretics to continue, labs de-escalate, prednisone discontinued. 4/11/2022 - Patient has drank 2.5 L in the last 24 hours. Admitted with CHF exacerbation, currently on IV Lasix 80 mg IV twice daily. Did receive this morning. We will get a chest x-ray to look for any worsening pulmonary edema. Mild edema bilateral lower extremities noted. Labs Descalated, prednisone discontinued    4/13/2022 - Overnight BG went up to 423 and then insulin Lantus was increased to 20 nightly.     4/14/2022 -Lasix changed from 80 mg to 40 mg IV twice daily       OBJECTIVE     Vital Signs:  /64   Pulse 86   Temp 98.5 °F (36.9 °C) (Oral)   Resp 15   Ht 5' 6\" (1.676 m)   Wt (!) 334 lb 3.5 oz (151.6 kg) SpO2 91%   BMI 53.94 kg/m²     Temp (24hrs), Av.5 °F (36.9 °C), Min:97.7 °F (36.5 °C), Max:99.3 °F (37.4 °C)    In: 1000   Out: 1200 [Urine:1200]    Physical Exam:  Constitutional: This is a well developed, well nourished, Greater than 40 - Morbid Obesity / Extreme Obesity / Grade III 55y.o. year old female who is alert, oriented, cooperative and in no apparent distress. Head:normocephalic and atraumatic. On high flow nasal cannula  EENT:  PERRLA. No conjunctival injections. Septum was midline, mucosa was without erythema, exudates or cobblestoning. No thrush was noted. Neck: Supple without thyromegaly. No elevated JVP. Trachea was midline. Respiratory: Breath sounds clear to auscultation bilaterally. No crackles, wheezes or rales. Cardiovascular: S1-S2 heard which are regular without murmur, clicks, gallops or rubs. Abdomen: Abdomen distended, bowel sounds present. No organomegaly. Lymphatic: No lymphadenopathy. Musculoskeletal: Normal curvature of the spine. No gross muscle weakness. Extremities: Bilateral lower extremity edema improved. Skin:  Warm and dry. Good color, turgor and pigmentation. No lesions or scars.   No cyanosis or clubbing  Neurological/Psychiatric: The patient's general behavior, level of consciousness, thought content and emotional status is normal.        Medications:  Scheduled Medications:    furosemide        potassium chloride  20 mEq Oral BID WC    lactobacillus  1 capsule Oral Daily with breakfast    furosemide  40 mg IntraVENous BID    lidocaine  1 patch TransDERmal Daily    [Held by provider] insulin glargine  20 Units SubCUTAneous Nightly    insulin lispro  0-18 Units SubCUTAneous TID     insulin lispro  0-9 Units SubCUTAneous Nightly    sertraline  100 mg Oral Daily    albuterol  2.5 mg Nebulization 4x daily    amLODIPine  5 mg Oral Daily    atorvastatin  40 mg Oral Nightly    budesonide-formoterol  2 puff Inhalation BID    ferrous sulfate 325 mg Oral BID WC    folic acid  1 mg Oral Daily    hydrALAZINE  25 mg Oral 3 times per day    isosorbide dinitrate  20 mg Oral TID    pantoprazole  40 mg Oral QAM AC    spironolactone  25 mg Oral Daily    tiotropium  2 puff Inhalation Daily    vitamin B-12  1,000 mcg Oral Daily    sodium chloride flush  10 mL IntraVENous 2 times per day    heparin (porcine)  5,000 Units SubCUTAneous 3 times per day    gabapentin  300 mg Oral TID     Continuous Infusions:    sodium chloride Stopped (04/09/22 0636)    dextrose       PRN Medicationsloperamide, 2 mg, 4x Daily PRN  calcium carbonate, 500 mg, TID PRN  oxyCODONE-acetaminophen, 1 tablet, Q6H PRN  acetaminophen, 650 mg, Q4H PRN  sodium chloride, 1 spray, PRN  albuterol sulfate HFA, 2 puff, Q6H PRN  diazePAM, 5 mg, Q12H PRN  sodium chloride flush, 10 mL, PRN  sodium chloride, , PRN  magnesium sulfate, 1,000 mg, PRN  ondansetron, 4 mg, Q8H PRN   Or  ondansetron, 4 mg, Q6H PRN  polyethylene glycol, 17 g, Daily PRN  glucose, 15 g, PRN  dextrose, 12.5 g, PRN  glucagon (rDNA), 1 mg, PRN  dextrose, 100 mL/hr, PRN  albuterol, 2.5 mg, As Directed RT PRN        Diagnostic Labs:  CBC:   No results for input(s): WBC, RBC, HGB, HCT, MCV, RDW, PLT in the last 72 hours. BMP:   Recent Labs     04/18/22  0544 04/18/22  0544 04/18/22  1546 04/19/22  0428 04/20/22  0542     --   --  141 136   K 3.2*   < > 3.7 3.7 3.0*   CL 86*  --   --  92* 90*   CO2 35*  --   --  33* 32*   BUN 21*  --   --  21* 19   CREATININE 1.12*  --   --  1.13* 1.29*    < > = values in this interval not displayed. BNP: No results for input(s): BNP in the last 72 hours. PT/INR: No results for input(s): PROTIME, INR in the last 72 hours. APTT: No results for input(s): APTT in the last 72 hours. CARDIAC ENZYMES: No results for input(s): CKMB, CKMBINDEX, TROPONINI in the last 72 hours.     Invalid input(s): CKTOTAL;3  FASTING LIPID PANEL:  Lab Results   Component Value Date    CHOL 144 11/17/2018 HDL 42 10/07/2020    TRIG 68 11/17/2018     LIVER PROFILE:   No results for input(s): AST, ALT, ALB, BILIDIR, BILITOT, ALKPHOS in the last 72 hours. MICROBIOLOGY:   Lab Results   Component Value Date/Time    CULTURE NO GROWTH 5 DAYS 04/09/2022 02:22 AM       Imaging:    XR CHEST PORTABLE    Result Date: 4/8/2022  Limited negative portable chest radiograph. ECHO Complete 2D W Doppler W Color    Result Date: 3/22/2022  Transthoracic Echocardiography Report (TTE)  Patient Name PRICE       Date of Study               03/22/2022               Gearline Robes   Date of      1975  Gender                      Female  Birth   Age          55 year(s)  Race                        Black   Room Number  2008        Height:                     65.98 inch, 167.6 cm   Corporate ID [id number]    Weight:                     380 pounds, 172.4 kg  #   Patient Acct [de-identified]   BSA:          2.63 m^2      BMI:      61.36  #                                                              kg/m^2   MR #         [id number]     [facility]   Accession #  [id number]  Interpreting Physician      [address] [place] [address]   Fellow                   Referring Nurse                           Practitioner   Interpreting             Referring Physician         [facility]  Fellow  Additional Comments Technically difficult study. Type of Study   TTE procedure:2D Echocardiogram, M-Mode, Doppler, Color Doppler. Procedure Date Date: 03/22/2022 Start: 03:37 PM Study Location: [facility] Technical Quality: Adequate visualization Indications:Dyspnea/SOB. History / Tech. Comments: Echo done at patient bedside. Procedure explained to patient. HTN, COPD, PHTN, STEPH, HX NSTEMI Patient Status: Inpatient Height: 65.98 inches Weight: 380 pounds BSA: 2.63 m^2 BMI: 61.36 kg/m^2 Allergies   - Aspirin.   - Sulfa. CONCLUSIONS Summary Left ventricle is normal in size.  Global left ventricular systolic function is normal. Calculated ejection fraction 60% by Heart Model. Marked Leftward compression of inter-ventricular septum (\"D-sign\") indicating RV volume or pressure overload. Right ventricular function appears reduced. Moderately dilated right ventricular cavity. Moderate to severe tricuspid regurgitation. Severe pulmonary hypertension. Estimated right ventricular systolic pressure is 83EYMI. Trivial pulmonic insufficiency. Signature ----------------------------------------------------------------------------  Electronically signed by Juliet Adam(Sonographer) on 03/22/2022  04:14 PM ---------------------------------------------------------------------------- ----------------------------------------------------------------------------  Electronically signed by Don Ellington(Interpreting physician) on 03/23/2022  11:48 AM ---------------------------------------------------------------------------- FINDINGS Left Atrium Left atrium is normal in size. Left Ventricle Left ventricle is normal in size. Global left ventricular systolic function is normal. Calculated ejection fraction 60% by Heart Model. Marked Leftward compression of inter-ventricular septum (\"D-sign\") indicating RV volume or pressure overload. Right Atrium Right atrium is normal in size. Right Ventricle Right ventricular function appears reduced. Moderately dilated right ventricular cavity. Mitral Valve Normal mitral valve structure and function. No mitral regurgitation. Aortic Valve Aortic valve is trileaflet and opens adequately. No aortic insufficiency. Tricuspid Valve No obvious valvular abnormality. Moderate to severe tricuspid regurgitation. Severe pulmonary hypertension. Estimated right ventricular systolic pressure is 07QZTL. Pulmonic Valve The pulmonic valve is normal in structure. Trivial pulmonic insufficiency. Pericardial Effusion No pericardial effusion seen. Miscellaneous E/E' average = 7.1.  IVC normal diameter & inspiratory collapse indicating normal RA filling pressure . M-mode / 2D Measurements & Calculations:   LVIDd:3.4 cm(3.7 - 5.6 cm)       Diastolic MKWJQV:66.7 ml  ILEWI:3.4 cm(2.2 - 4.0 cm)       Systolic ZBANKN:88.5 ml  JBQI:1.0 cm(0.6 - 1.1 cm)        Aortic Root:2.8 cm(2.0 - 3.7 cm)  LVPWd:1 cm(0.6 - 1.1 cm)         LA Dimension: 2.4 cm(1.9 - 4.0 cm)  Fractional Shortenin.24 %    LA volume/Index: 47.93 ml /18m^2  Calculated LVEF (%): 60.84 %     LVOT:1.7 cm                                   RVDd:4.91 cm   Mitral:                                 Aortic   Valve Area (P1/2-Time): 3.86 cm^2       Peak Velocity: 1.66 m/s  Peak E-Wave: 0.75 m/s                   Mean Velocity: 1.04 m/s  Peak A-Wave: 0.60 m/s                   Peak Gradient: 11.02 mmHg  E/A Ratio: 1.25                         Mean Gradient: 5 mmHg  Peak Gradient: 2.25 mmHg  Mean Gradient: 2 mmHg  Deceleration Time: 195 msec             Area (continuity): 1.37 cm^2  P1/2t: 57 msec                          AV VTI: 29.9 cm   Area (continuity): 1.96 cm^2  Mean Velocity: 0.75 m/s   Tricuspid:                              Pulmonic:   Peak TR Velocity: 4.34 m/s              Peak Velocity: 1.22 m/s  Peak TR Gradient: 75.3424 mmHg          Peak Gradient: 5.95 mmHg  Diastology / Tissue Doppler Septal Wall E' velocity:0.10 m/s Septal Wall E/E':7.7 Lateral Wall E' velocity:0.11 m/s Lateral Wall E/E':6.6    XR ABDOMEN (KUB) (SINGLE AP VIEW)    Result Date: 2022  EXAMINATION: ONE SUPINE XRAY VIEW(S) OF THE ABDOMEN 2022 12:45 pm COMPARISON: CT dated 2019. HISTORY: ORDERING SYSTEM PROVIDED HISTORY: Constipation, right lower abdominal  pain TECHNOLOGIST PROVIDED HISTORY: Constipation, right lower abdominal  pain Reason for Exam: supine FINDINGS: Nonspecific bowel gas pattern is seen with gaseous distension of the stomach. Mild-to-moderate amount of stool is noted in the colon. Evaluation of free air is limited on this supine view. There appears to be Trujillo catheter in place.      Nonspecific bowel gas pattern with mild-to-moderate amount of stool noted in the colon. Gaseous distention of the stomach. XR CHEST PORTABLE    Result Date: 4/10/2022  EXAMINATION: ONE XRAY VIEW OF THE CHEST 4/10/2022 9:12 am COMPARISON: 04/08/2022 HISTORY: Reason for Exam: Acute hypoxia admitted with Pul edema FINDINGS: Stable cardiomegaly. Mild central vascular congestion. Interval increased right perihilar/infrahilar opacity. No sizable effusion or discernible pneumothorax. Osseous structures grossly intact. *Stable cardiomegaly with mild central vascular congestion. *Interval increased right perihilar/infrahilar opacity which may represent developing pneumonia. XR CHEST PORTABLE    Result Date: 4/8/2022  EXAMINATION: ONE XRAY VIEW OF THE CHEST 4/8/2022 4:37 pm COMPARISON: 03/22/2022 HISTORY: ORDERING SYSTEM PROVIDED HISTORY: Shortness of breath, COPD and CHF, recent admission for pneumonia TECHNOLOGIST PROVIDED HISTORY: Shortness of breath, COPD and CHF, recent admission for pneumonia Reason for Exam: upr,sob, FINDINGS: Examination is limited by the patient's body habitus and by portable technique. Cardial pericardial silhouette is prominent but stable. There are low lung volumes is secondary bronchovascular crowding. No focal infiltrate is identified. No pneumothorax. No free air. No acute bony abnormality. Limited negative portable chest radiograph. XR CHEST PORTABLE    Result Date: 3/22/2022  EXAMINATION: ONE XRAY VIEW OF THE CHEST 3/22/2022 9:30 am COMPARISON: 03/19/2022 HISTORY: ORDERING SYSTEM PROVIDED HISTORY: improvement in pnuemonia TECHNOLOGIST PROVIDED HISTORY: improvement in pnuemonia Reason for Exam: ap uprt port chest FINDINGS: As compared to prior examination, there has been significant improvement in the right lower lobe infiltrate. Lungs appear clear. There is cardiomegaly noted. Bony structures unremarkable. Improvement in the the right basal infiltrate.      XR CHEST PORTABLE    Result Date: 3/19/2022  EXAMINATION: ONE XRAY VIEW OF THE CHEST 3/19/2022 12:51 am COMPARISON: CT PA performed approximately 10 hours earlier. Portable chest obtained approximately 12 hours earlier. HISTORY: ORDERING SYSTEM PROVIDED HISTORY: Increasing O2 requirment TECHNOLOGIST PROVIDED HISTORY: Increasing O2 requirment Reason for Exam: shortness of breath, chest pain off and on   upright port FINDINGS: Mild cardiomegaly and normal pulmonary vasculature. Right worse than left bibasilar patchy airspace opacities which appear worse than exam 12 hours earlier. No pneumothorax or pleural effusion. Surrounding structures are unremarkable. Findings suggestive of worsening bibasilar pneumonia. XR CHEST PORTABLE    Result Date: 3/18/2022  EXAMINATION: ONE XRAY VIEW OF THE CHEST 3/18/2022 11:16 am COMPARISON: Chest x-ray dated 29 June 2021 HISTORY: ORDERING SYSTEM PROVIDED HISTORY: sob TECHNOLOGIST PROVIDED HISTORY: sob FINDINGS: Mild stable cardiomegaly with pulmonary vascular congestion. No pneumothorax or pleural effusion. Stable cardiomegaly with mild pulmonary vascular congestion. CT CHEST PULMONARY EMBOLISM W CONTRAST    Result Date: 3/18/2022  EXAMINATION: CTA OF THE CHEST 3/18/2022 1:23 pm TECHNIQUE: CTA of the chest was performed after the administration of intravenous contrast.  Multiplanar reformatted images are provided for review. MIP images are provided for review. Dose modulation, iterative reconstruction, and/or weight based adjustment of the mA/kV was utilized to reduce the radiation dose to as low as reasonably achievable.  COMPARISON: None HISTORY: ORDERING SYSTEM PROVIDED HISTORY: sedentary lifestyle, leg swelling, increased O2 TECHNOLOGIST PROVIDED HISTORY: sedentary lifestyle, leg swelling, increased O2 Decision Support Exception - unselect if not a suspected or confirmed emergency medical condition->Emergency Medical Condition (MA) Reason for Exam: sedentary lifestyle, leg swelling, increased O2 FINDINGS: Pulmonary Arteries: Pulmonary arteries are adequately opacified for evaluation. No evidence of intraluminal filling defect to suggest pulmonary embolism. Main pulmonary artery is normal in caliber. Mediastinum: No evidence of mediastinal lymphadenopathy. Small reactive lymph nodes seen in the mediastinum and hilar regions. The heart and pericardium demonstrate no acute abnormality. There is no acute abnormality of the thoracic aorta. Lungs/pleura: Patchy ground-glass opacity and increased markings seen in the lower lung fields bilaterally concerning for bibasilar infiltrate. No pleural effusion or pneumothorax. No pulmonary mass or nodule. Patchy ground-glass opacity in the upper lung fields bilaterally. Upper Abdomen: Limited images of the upper abdomen are unremarkable. Soft Tissues/Bones: No acute bone or soft tissue abnormality. Degenerative changes seen within the spine with no chest wall abnormality. No evidence of pulmonary embolism. There is patchy ill-defined opacification lower lung fields bilaterally suggesting infiltrate with ground-glass opacity concerning for pneumonia as well in the upper lung fields. Reactive adenopathy throughout the mediastinum and hilar regions.  RECOMMENDATIONS: Unavailable     VL DUP LOWER EXTREMITY VENOUS BILATERAL    Result Date: 3/19/2022    [facility]  Vascular Lower Extremities DVT Study Procedure   Patient Name  CARMEN       Date of Study           03/18/2022                [address] [facility]   Date of Birth 1975  Gender                  Female   Age           55 year(s)  Race                    Black   Room Number   2008        Height:                 65.98 inch, 167.6 cm   Corporate ID  [id number]    Weight:                 380 pounds, 172.4 kg  #   Patient Acct  [de-identified]   BSA:        2.63 m^2    BMI:       61.36 kg/m^2  #   MR #          [id number]     Sonographer             NIKKI Valadez Bis   Accession # [id number]  Interpreting Physician  Shantanu Ordoñez   Referring                 Referring Physician     Waldo Velasco. Alise Rodrigues DO  Nurse  Practitioner  Procedure Type of Study:   Veins: Lower Extremities DVT Study, Venous Scan Lower Bilateral.  Indications for Study:Leg Swelling and Shortness of breath. Patient Status:ER. Technical Quality:Limited visualization. Limitation reason:bedside exam , body habitus, deep vessels, edema. Conclusions   Summary   No evidence of superficial or deep venous thrombosis in both lower  extremities. Signature   ----------------------------------------------------------------  Electronically signed by NIKKI Harris(Sonographer) on  03/18/2022 01:27 PM  ----------------------------------------------------------------   ----------------------------------------------------------------  Electronically signed by Sula Ping Reyes,Arthur(Interpreting  physician) on 03/19/2022 07:28 AM  ----------------------------------------------------------------  Findings:   Right Impression:                    Left Impression:  The common femoral, femoral,         The common femoral, femoral,  popliteal and tibial veins           popliteal and tibial veins  demonstrate normal compressibility   demonstrate normal compressibility  and augmentation. and augmentation. Normal compressibility of the great  Normal compressibility of the great  saphenous vein. saphenous vein. Normal compressibility of the small  Normal compressibility of the small  saphenous vein. saphenous vein. Limited visualization of the         Limited visualization of the  posterior tibial and peroneal veins. posterior tibial and peroneal veins. Risk Factors History +-------------------------------------------+----------+-------------------+ ! Diagnosis                                  ! Date      ! Comments           ! +-------------------------------------------+----------+-------------------+ ! Previous Scan                              !04/04/2008! WNL                ! +-------------------------------------------+----------+-------------------+ ! Chronic lung disease->COPD                 !          !                   ! +-------------------------------------------+----------+-------------------+ ! Previous Scan                              !09/22/2014! Bilateral WNL      ! +-------------------------------------------+----------+-------------------+ ! CHF                                        ! !                   ! +-------------------------------------------+----------+-------------------+   - The patient's risk factor(s) include: chronic lung disease, dyslipidemia     and arterial hypertension.   - The patient has a former tobacco history. Allergies   - Allergy:Aspirin(Drug). - Allergy:Sulfa(Drug). Velocities are measured in cm/s ; Diameters are measured in cm Right Lower Extremities DVT Study Measurements Right 2D Measurements +------------------------------------+----------+---------------+----------+ ! Location                            ! Visualized! Compressibility! Thrombosis! +------------------------------------+----------+---------------+----------+ ! Common Femoral                      !Yes       ! Yes            ! None      ! +------------------------------------+----------+---------------+----------+ ! Prox Femoral                        !Yes       ! Yes            ! None      ! +------------------------------------+----------+---------------+----------+ ! Mid Femoral                         !Partial   !Yes            ! None      ! +------------------------------------+----------+---------------+----------+ ! Dist Femoral                        !Partial   !Yes            ! None      ! +------------------------------------+----------+---------------+----------+ ! Popliteal                           !Yes       ! Yes !None      ! +------------------------------------+----------+---------------+----------+ ! Sapheno Femoral Junction            ! Yes       ! Yes            ! None      ! +------------------------------------+----------+---------------+----------+ ! PTV                                 ! Partial   !Yes            ! None      ! +------------------------------------+----------+---------------+----------+ ! Peroneal                            !Partial   !Yes            ! None      ! +------------------------------------+----------+---------------+----------+ ! Gastroc                             ! Yes       ! Yes            ! None      ! +------------------------------------+----------+---------------+----------+ ! GSV Thigh                           ! Yes       ! Yes            ! None      ! +------------------------------------+----------+---------------+----------+ ! GSV Knee                            ! Yes       ! Yes            ! None      ! +------------------------------------+----------+---------------+----------+ ! GSV Ankle                           ! Yes       ! Yes            ! None      ! +------------------------------------+----------+---------------+----------+ ! SSV                                 ! Yes       ! Yes            ! None      ! +------------------------------------+----------+---------------+----------+ Right Doppler Measurements +--------------------------+---------+------+------------------------------+ ! Location                  ! Signal   !Reflux! Reflux (msec)                 ! +--------------------------+---------+------+------------------------------+ ! Common Femoral            !Pulsatile!      !                              ! +--------------------------+---------+------+------------------------------+ ! Prox Femoral              !Pulsatile!      !                              ! +--------------------------+---------+------+------------------------------+ ! Popliteal                 !Pulsatile!      ! ! +--------------------------+---------+------+------------------------------+ Left Lower Extremities DVT Study Measurements Left 2D Measurements +------------------------------------+----------+---------------+----------+ ! Location                            ! Visualized! Compressibility! Thrombosis! +------------------------------------+----------+---------------+----------+ ! Common Femoral                      !Yes       ! Yes            ! None      ! +------------------------------------+----------+---------------+----------+ ! Prox Femoral                        !Yes       ! Yes            ! None      ! +------------------------------------+----------+---------------+----------+ ! Mid Femoral                         !Partial   !Yes            ! None      ! +------------------------------------+----------+---------------+----------+ ! Dist Femoral                        !Partial   !Yes            ! None      ! +------------------------------------+----------+---------------+----------+ ! Popliteal                           !Yes       ! Yes            ! None      ! +------------------------------------+----------+---------------+----------+ ! Sapheno Femoral Junction            ! Yes       ! Yes            ! None      ! +------------------------------------+----------+---------------+----------+ ! PTV                                 ! Partial   !Yes            ! None      ! +------------------------------------+----------+---------------+----------+ ! Peroneal                            !Partial   !Yes            ! None      ! +------------------------------------+----------+---------------+----------+ ! Gastroc                             ! Yes       ! Yes            ! None      ! +------------------------------------+----------+---------------+----------+ ! GSV Thigh                           ! Yes       ! Yes            ! None      ! +------------------------------------+----------+---------------+----------+ ! GSV Knee !Yes       !Yes            ! None      ! +------------------------------------+----------+---------------+----------+ ! GSV Ankle                           ! Yes       ! Yes            ! None      ! +------------------------------------+----------+---------------+----------+ ! SSV                                 ! Yes       ! Yes            ! None      ! +------------------------------------+----------+---------------+----------+ Left Doppler Measurements +--------------------------+---------+------+------------------------------+ ! Location                  ! Signal   !Reflux! Reflux (msec)                 ! +--------------------------+---------+------+------------------------------+ ! Common Femoral            !Pulsatile!      !                              ! +--------------------------+---------+------+------------------------------+ ! Prox Femoral              !Pulsatile!      !                              ! +--------------------------+---------+------+------------------------------+ ! Popliteal                 !Pulsatile!      !                              ! +--------------------------+---------+------+------------------------------+    FL MODIFIED BARIUM SWALLOW W VIDEO    Result Date: 3/19/2022  EXAMINATION: MODIFIED BARIUM SWALLOW WAS PERFORMED IN CONJUNCTION WITH SPEECH PATHOLOGY SERVICES TECHNIQUE: Fluoroscopic evaluation of the swallowing mechanism was performed using cineradiography with multiple consistency of barium product in conjunction with speech pathology services.  FLUOROSCOPY DOSE AND TYPE OR TIME AND EXPOSURES: Fluoro time: 1.1 minute DAP: 22.570 mGy COMPARISON: None HISTORY: ORDERING SYSTEM PROVIDED HISTORY: h/o esophageal stricture per patient TECHNOLOGIST PROVIDED HISTORY: h/o esophageal stricture per patient 75-year-old female with history of esophageal stricture FINDINGS: No penetration or aspiration with the thin liquid and thick liquid substance by straw, pureed/pudding thick, soft solid and cookie solid substances. No penetration or aspiration with the above administered substances. Please see separate speech pathology report for full discussion of findings and recommendations. ASSESSMENT & PLAN     ASSESSMENT / PLAN:     Principal Problem:    CHF (congestive heart failure), NYHA class I, acute on chronic, combined ([facility] [address]  Active Problems:    Diarrhea    Asthma    HTN (hypertension)    Acute respiratory failure with hypoxia and hypercapnia (HCC)    Morbid obesity ([address]    Pulmonary hypertension, moderate to severe (HCC)    Morbid obesity with BMI of 50.0-59.9, adult (HCC)    Obesity hypoventilation syndrome (HCC)    STEPH (obstructive sleep apnea)    Chronic respiratory failure (HCC)    Normocytic anemia    Hyperlipidemia    Cor pulmonale, chronic (HCC)  Resolved Problems:    * No resolved hospital problems. *      Acute on chronic hypoxic hypercapnic respiratory failure likely secondary to severe pulmonary hypertension, acute on chronic cor pulmonale with history of STEPH/OHS/morbid obesity, asthma/COPD, prior history of tracheostomy  -Continues to be on diuretics 40 IV twice daily, bronchodilators, pulmonology following  -Continues to be on high flow nasal cannula 50-->60% FiO2 25 L, alternating with BiPAP  -Long-term plan to have trilogy ventilator, pending insurance/physician review approval.  -Patient rejected at [facility]. Will likely go to SNF. Concern for pneumonia-completed course of Levaquin this admission    Essential HTN continue current regimen    Anemia of chronic disease continue iron, folic acid, Y05 supplementation    Diabetes mellitus type 2 -continue Lantus 20 nightly with sliding scale. Hypokalemia potassium low at 3.0. Replace and follow BMP. DVT ppx: heparin  GI ppx: protonix   Diet: Regular    Patient has been seen by palliative care on 4/12/2022. Patient and family want everything to be done to the patient. CODE STATUS -full code.     PT/OT/SW : On board.  Discharge Planning:  Discharge to [facility].  assisting with transitional planning. Called  [phone] for peer to peer. Patient denied in LTAC as she is being actively treated for heart failure. Patient can likely go to [facility] per Dr. Carlos Carroll. Secondarily at [facility] being done. Migdalia Hernández MD  Internal Medicine Resident, PGY-1  [address] [place], [place]  4/20/2022, 12:08 PM    Attestation and add on       I have discussed the care of Franco Rios , including pertinent history and exam findings,      4/20/22    with the resident. I have seen and examined the patient and the key elements of all parts of the encounter have been performed by me . I agree with the assessment, plan and orders as documented by the resident. Principal Problem:    CHF (congestive heart failure), NYHA class I, acute on chronic, combined ([facility] [address]  Active Problems:    Diarrhea    Asthma    HTN (hypertension)    Acute respiratory failure with hypoxia and hypercapnia (HCC)    Morbid obesity ([address]    Pulmonary hypertension, moderate to severe (HCC)    Morbid obesity with BMI of 50.0-59.9, adult (HCC)    Obesity hypoventilation syndrome (HCC)    STEPH (obstructive sleep apnea)    Chronic respiratory failure (HCC)    Normocytic anemia    Hyperlipidemia    Cor pulmonale, chronic (HCC)  Resolved Problems:    * No resolved hospital problems. *        ''''''''''       MD KENNETH Heredia [address], [address] [place] [address].    Phone [phone]   Fax: [phone]  Answering Service: [phone]

## 2022-04-20 NOTE — PLAN OF CARE
Problem: Respiratory:  Goal: Ability to maintain adequate ventilation will improve  Description: Ability to maintain adequate ventilation will improve  Outcome: Ongoing  Goal: Respiratory status will improve  Description: Respiratory status will improve  Outcome: Ongoing

## 2022-04-20 NOTE — CARE COORDINATION
Spoke to Alysha at [facility]. He is working on second appeal. Update given to pt    [phone] received expedited appeal paper from [facility]. Dr Reynold Johnson signed.  Faxed to Nas Dill at [facility]

## 2022-04-20 NOTE — PROGRESS NOTES
PULMONARY & CRITICAL CARE MEDICINE PROGRESS NOTE     Patient:  Yissel Gotti  MRN: [id number]  Admit date: 2022  Primary Care Physician: Kate Liu DO  Consulting Physician: Naomy Gooden MD  CODE Status: Full Code  LOS: 12     SUBJECTIVE     CHIEF COMPLAINT/REASON FOR INITIAL CONSULT: Acute on chronic respiratory failure    BRIEF HOSPITAL COURSE:  The patient is a 55 y.o. 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 Patient was discharged home recently on supplemental oxygen and insurance had not approved trilogy so she was sent home with her home CPAP. Patient was brought back to the hospital because she was having worsening shortness of breath. Unable to use her CPAP because of fatigue. On arrival EMS found that her saturation was in mid [de-identified]. She was placed on nonrebreather brought to the ER. Pulmonary has been consulted because of high flow requirement and hypoxia. Patient is laying in bed, she is on high flow oxygen 80%, using BiPAP at night. Her BNP was elevated. She is under going diuresis,   On Symbicort, Spiriva and albuterol. INTERVAL HISTORY:  22  Patient remains on high flow nasal cannula O2 Flow Rate (L/min)  Av L/min  Min: 25 L/min  Max: 25 L/min, FiO2 60%  She was on BiPAP /10 overnight  Denies any new complaints  No overnight issues reported  Afebrile, hemodynamically stable  On diuresis with Lasix, urine output 1.2 L yesterday    REVIEW OF SYSTEMS:  Review of Systems   Constitutional: Positive for fatigue. Negative for fever. HENT: Negative for voice change. Eyes: Negative for discharge and visual disturbance. Respiratory: Positive for shortness of breath. Gastrointestinal: Negative for abdominal pain, diarrhea and vomiting. Genitourinary: Negative for dysuria and urgency. Musculoskeletal: Negative for joint swelling.    Allergic/Immunologic: Negative for environmental allergies and immunocompromised state. Neurological: Positive for weakness. Hematological: Negative for adenopathy. Does not bruise/bleed easily. Psychiatric/Behavioral: Negative for behavioral problems. OBJECTIVE     PaO2/FiO2 RATIO:  No results for input(s): POCPO2 in the last 72 hours. FiO2 : 60 %     VITAL SIGNS:   LAST:  /66   Pulse 90   Temp 98.2 °F (36.8 °C) (Oral)   Resp 20   Ht 5' 6\" (1.676 m)   Wt (!) 334 lb 3.5 oz (151.6 kg)   SpO2 96%   BMI 53.94 kg/m²   8-24 HR RANGE:  TEMP Temp  Av.4 °F (36.9 °C)  Min: 97.7 °F (36.5 °C)  Max: 99.3 °F (97.4 °C)   BP Systolic (38IMR), SUF:609 , Min:102 , PIR:135      Diastolic (63YVS), SMR:45, Min:55, Max:75     PULSE Pulse  Av.1  Min: 76  Max: 95   RR Resp  Av  Min: 15  Max: 23   O2 SAT SpO2  Av.8 %  Min: 91 %  Max: 96 %   OXYGEN DELIVERY O2 Flow Rate (L/min)  Av L/min  Min: 25 L/min  Max: 25 L/min         Physical Exam  Constitutional:       General: She is awake. Appearance: She is morbidly obese. She is ill-appearing. HENT:      Head: Normocephalic and atraumatic. Eyes:      General: No scleral icterus. Conjunctiva/sclera: Conjunctivae normal.      Pupils: Pupils are equal, round, and reactive to light. Cardiovascular:      Rate and Rhythm: Normal rate and regular rhythm. Heart sounds: No murmur heard. Pulmonary:      Effort: Prolonged expiration present. No accessory muscle usage or respiratory distress. Breath sounds: Decreased air movement present. Abdominal:      General: Bowel sounds are normal.      Palpations: Abdomen is soft. Tenderness: There is no abdominal tenderness. Musculoskeletal:      Cervical back: Neck supple. Neurological:      General: No focal deficit present. Mental Status: She is alert.        DATA REVIEW     Medications: Current Inpatient  Scheduled Meds:   furosemide        potassium chloride  20 mEq Oral BID WC    lactobacillus  1 capsule Oral Daily with breakfast    furosemide  40 mg IntraVENous BID    lidocaine  1 patch TransDERmal Daily    [Held by provider] insulin glargine  20 Units SubCUTAneous Nightly    insulin lispro  0-18 Units SubCUTAneous TID WC    insulin lispro  0-9 Units SubCUTAneous Nightly    sertraline  100 mg Oral Daily    albuterol  2.5 mg Nebulization 4x daily    amLODIPine  5 mg Oral Daily    atorvastatin  40 mg Oral Nightly    budesonide-formoterol  2 puff Inhalation BID    ferrous sulfate  325 mg Oral BID WC    folic acid  1 mg Oral Daily    hydrALAZINE  25 mg Oral 3 times per day    isosorbide dinitrate  20 mg Oral TID    pantoprazole  40 mg Oral QAM AC    spironolactone  25 mg Oral Daily    tiotropium  2 puff Inhalation Daily    vitamin B-12  1,000 mcg Oral Daily    sodium chloride flush  10 mL IntraVENous 2 times per day    heparin (porcine)  5,000 Units SubCUTAneous 3 times per day    gabapentin  300 mg Oral TID     Continuous Infusions:   sodium chloride Stopped (04/09/22 0636)    dextrose         INPUT/OUTPUT:  In: 1000 [P.O.:1000]  Out: 1200 [Urine:1200]  Date 04/20/22 0000 - 04/20/22 2359   Shift 0985-2998 5059-1198 2211-3765 24 Hour Total   INTAKE   P.O.(mL/kg/hr)  300(0.2)  300   Shift Total(mL/kg)  300(2)  300(2)   OUTPUT   Urine(mL/kg/hr) 800(0.7)   800   Shift Total(mL/kg) 800(5.3)   800(5.3)   Weight (kg) 151.6 151.6 151.6 151.6        LABS:  ABGs:   No results for input(s): POCPH, POCPCO2, POCPO2, POCHCO3, [id number] in the last 72 hours. CBC:   No results for input(s): WBC, HGB, HCT, MCV, PLT, LABLYMP, MID, GRAN, LYMPHOPCT, MIDPERCENT, GRANULOCYTES, RBC, MCH, MCHC, RDW in the last 72 hours. Invalid input(s): RELATIVEPERCENT  CRP:   No results for input(s): CRP in the last 72 hours. LDH:   No results for input(s): LDH in the last 72 hours.   BMP:   Recent Labs     04/18/22  0544 04/18/22  1546 04/19/22  0428 04/20/22  0542     --  141 136   K 3.2* 3.7 3.7 3.0*   CL 86*  --  92* 90* CO2 35*  --  33* 32*   BUN 21*  --  21* 19   CREATININE 1.12*  --  1.13* 1.29*   GLUCOSE 119*  --  112* 94     Liver Function Test:   No results for input(s): PROT, LABALBU, ALT, AST, GGT, ALKPHOS, BILITOT in the last 72 hours. Coagulation Profile:   No results for input(s): INR, PROTIME, APTT in the last 72 hours. D-Dimer:  No results for input(s): DDIMER in the last 72 hours. Lactic Acid:  No results for input(s): LACTA in the last 72 hours. Cardiac Enzymes:  No results for input(s): CKTOTAL, CKMB, CKMBINDEX, TROPONINI in the last 72 hours. Invalid input(s): TROPONIN, HSTROP  BNP/ProBNP:   No results for input(s): BNP, PROBNP in the last 72 hours. Triglycerides:  No results for input(s): TRIG in the last 72 hours. Microbiology:  Urine Culture:  No components found for: CURINE  Blood Culture:  No components found for: CBLOOD, CFUNGUSBL  Sputum Culture:  No components found for: CSPUTUM  No results for input(s): SPECDESC, SPECIAL, CULTURE, STATUS, ORG, CDIFFTOXPCR, CAMPYLOBPCR, SALMONELLAPC, SHIGAPCR, SHIGELLAPCR, MPNEUG, MPNEUM, LACTOQL in the last 72 hours. No results for input(s): SPUTUM, SPECDESC, SPECIAL, CULTURE, STATUS, ORG, CDIFFTOXPCR, MPNEUM, MPNEUG in the last 72 hours. Invalid input(s): Skylar Mins, CFUNGUSBL     Pathology:    Radiology Reports:  XR CHEST PORTABLE   Final Result   *Stable cardiomegaly with mild central vascular congestion. *Interval increased right perihilar/infrahilar opacity which may represent   developing pneumonia. XR CHEST PORTABLE   Final Result   Limited negative portable chest radiograph. Echocardiogram:   Results for orders placed during the hospital encounter of 03/18/22    ECHO Complete 2D W Doppler W Color    Narrative    Summary  Left ventricle is normal in size. Global left ventricular systolic function is normal. Calculated ejection  fraction 60% by Heart Model.   Marked Leftward compression of inter-ventricular septum (\"D-sign\")  indicating RV volume or pressure overload. Right ventricular function appears reduced. Moderately dilated right  ventricular cavity. Moderate to severe tricuspid regurgitation. Severe pulmonary hypertension. Estimated right ventricular systolic pressure  is [id number]. Trivial pulmonic insufficiency. ASSESSMENT AND PLAN     Assessment:    // Acute on chronic hypoxic/hypercapnic respiratory failure  // Chronic obstructive pulmonary disease  // Diastolic CHF  // Severe pulmonary hypertension  // Chronic cor pulmonale  // Morbid obesity  // Obstructive sleep apnea  // Obesity hypoventilation syndrome    Plan:    I personally interviewed/examined the patient; reviewed interval history, interpreted all available radiographic and laboratory data at the time of service.  Patient remains on high flow nasal cannula, FiO2 60%, flow rate 25 L/min   Continue supplemental oxygen to keep oxygen saturation >90%   Continue nocturnal and as needed BiPAP   She will benefit from home NIV (trilogy) at the time of discharge   Encourage incentive spirometry/Acapella   Continue pulmonary toilet, aspiration precautions and bronchodilators   Continue diuresis with Lasix/Aldactone   Monitor I/O, electrolytes with a goal of even/negative fluid balance   Tolerating oral diet   Stress ulcer prophylaxis   Chemical DVT prophylaxis; heparin   Antimicrobials reviewed; completed 7-day course of Levaquin on 4/17   Completed prednisone taper   Physical/occupational therapy   Overall prognosis guarded   Awaits placement to LTAC    I would like to thank you for allowing me to participate in the care of this patient. Please feel free to call with any further questions or concerns. Kay De Los Santos MD  Pulmonary and Critical Care Medicine           4/20/2022, 4:10 PM    Please note that this chart was generated using voice recognition Dragon dictation software.   Although every effort was made to ensure the accuracy of this automated transcription, some errors in transcription may have occurred.

## 2022-04-20 NOTE — PROGRESS NOTES
Comprehensive Nutrition Assessment    Type and Reason for Visit:  Reassess    Nutrition Recommendations/Plan:   1. Modify current diet to provide 4 Carb Choices with each meal  2. Provide mixed berry Ensure Clear Liquid ONS x 2 per day. 3. Monitor PO/ONS intakes, labs, weights, and plan of care. Malnutrition Assessment:  Malnutrition Status: At risk for malnutrition    Context:  Acute Illness     Findings of the 6 clinical characteristics of malnutrition:  Energy Intake:  Mild decrease in energy intake  Weight Loss:   (Weight fluctuations due to fluids noted.)     Body Fat Loss:  No significant body fat loss   Muscle Mass Loss:  No significant muscle mass loss  Fluid Accumulation:  Mild (to Moderate) Extremities   Strength:  Not Performed    Nutrition Assessment:    Pt states she has not been eating as well past few days due to vomiting and diarrhea. Pt reports having nausea x past 4-5 days but has not had any nausea today. States for breakfast today she had some grits, suggs, and ginger ale. Pt willing to try Clear Liquid Ensure supplements in mixed berry. Weight fluctuations noted. Labs reviewed; K 3.0 mg/dL. Meds include: Zofran PRN, Lasix, Probiotics. Nutrition Related Findings:    Labs/Meds reviewed. Last BM 4/19. C/o nausea, vomiting, and diarrhea. Wound Type: None (Redness, skin tear, excoriated areas present)       Current Nutrition Intake & Therapies:    Average Meal Intake: 26-50%  Average Supplements Intake: None Ordered  ADULT DIET; Regular; 3 carb choices (45 gm/meal); 1500 ml    Anthropometric Measures:  Height: 5' 6\" (167.6 cm)  Ideal Body Weight (IBW): 130 lbs (59 kg)    Admission Body Weight: 355 lb (161 kg)  Current Body Weight: 334 lb 3.5 oz (151.6 kg), 257.1 % IBW.  Weight Source: Bed Scale  Current BMI (kg/m2): 54  Usual Body Weight: 355 lb (161 kg)  % Weight Change (Calculated): 0  BMI Categories: Obese Class 3 (BMI 40.0 or greater)    Estimated Daily Nutrient Needs:  Energy Requirements Based On: Kcal/kg  Weight Used for Energy Requirements: Current  Energy (kcal/day): 12-14 kcal/kg = 4369-5040 kcals/day  Weight Used for Protein Requirements: Ideal  Protein (g/day): 1.5-2.0 gm/kg =  gm pro/day  Method Used for Fluid Requirements: Other (Comment)  Fluid (ml/day): 1500 mL/day per diet order/MD    Nutrition Diagnosis:   · Inadequate oral intake related to  (decreased appetite; nausea, vomiting; diarrhea) as evidenced by intake 26-50% (need for oral supplements)      Nutrition Interventions:   Food and/or Nutrient Delivery: Modify Current Diet,Start Oral Nutrition Supplement (Provide 4 Carb Choices per meal.  Provide mixed berry Ensure Clear Liquid ONS x 2 per day.)  Nutrition Education/Counseling: No recommendation at this time  Coordination of Nutrition Care: Continue to monitor while inpatient    Goals:  Previous Goal Met: Progressing toward Goal(s)  Goals: Meet at least 75% of estimated needs       Nutrition Monitoring and Evaluation:   Behavioral-Environmental Outcomes: None Identified  Food/Nutrient Intake Outcomes: Food and Nutrient Intake,Supplement Intake  Physical Signs/Symptoms Outcomes: Biochemical Data,Diarrhea,GI Status,Nausea or Vomiting,Fluid Status or Edema,Hemodynamic Status    Discharge Planning:    Continue current diet     Connie Boyle, [address], [place]  Contact: [phone]

## 2022-04-20 NOTE — PLAN OF CARE
Problem: Falls - Risk of:  Goal: Will remain free from falls  Description: Will remain free from falls  4/20/2022 1041 by Chantale Camacho RN  Outcome: Progressing  4/20/2022 0321 by Popeye De La Cruz RN  Outcome: Ongoing  Goal: Absence of physical injury  Description: Absence of physical injury  4/20/2022 1041 by Chantale Camacho RN  Outcome: Progressing  4/20/2022 0321 by Popeye De La Cruz RN  Outcome: Ongoing     Problem: Skin Integrity:  Goal: Will show no infection signs and symptoms  Description: Will show no infection signs and symptoms  4/20/2022 1041 by Chantale Camacho RN  Outcome: Progressing  4/20/2022 0321 by Popeye De La Cruz RN  Outcome: Ongoing  Goal: Absence of new skin breakdown  Description: Absence of new skin breakdown  4/20/2022 1041 by Chantale Camacho RN  Outcome: Progressing  4/20/2022 0321 by Popeye De La Cruz RN  Outcome: Ongoing     Problem: Nutrition  Goal: Optimal nutrition therapy  4/20/2022 1041 by Chantale Camacho RN  Outcome: Progressing  4/20/2022 0321 by Popeye De La Cruz RN  Outcome: Ongoing     Problem: Pain:  Goal: Pain level will decrease  Description: Pain level will decrease  4/20/2022 1041 by Chantale Camacho RN  Outcome: Progressing  4/20/2022 0321 by Popeye De La Cruz RN  Outcome: Ongoing  Goal: Control of acute pain  Description: Control of acute pain  4/20/2022 1041 by Chantale Camacho RN  Outcome: Progressing  4/20/2022 0321 by Popeye De La Cruz RN  Outcome: Ongoing  Goal: Control of chronic pain  Description: Control of chronic pain  4/20/2022 1041 by Chantale Camacho RN  Outcome: Progressing  4/20/2022 0321 by Popeye De La Cruz RN  Outcome: Ongoing     Problem: Discharge Planning:  Goal: Discharged to appropriate level of care  Description: Discharged to appropriate level of care  4/20/2022 1041 by Chantale Camacho RN  Outcome: Progressing  4/20/2022 0321 by Popeye De La Cruz RN  Outcome: Ongoing     Problem:  Activity:  Goal: Capacity to carry out activities will improve  Description: Capacity to carry out activities will improve  4/20/2022 1041 by Davion Handy RN  Outcome: Progressing  4/20/2022 0321 by Sophia Tim RN  Outcome: Ongoing  Goal: Will verbalize the importance of balancing activity with adequate rest periods  Description: Will verbalize the importance of balancing activity with adequate rest periods  4/20/2022 1041 by Davion Handy RN  Outcome: Progressing  4/20/2022 0321 by Sophia Tim RN  Outcome: Ongoing     Problem: Cardiac:  Goal: Hemodynamic stability will improve  Description: Hemodynamic stability will improve  4/20/2022 1041 by Davion Handy RN  Outcome: Progressing  4/20/2022 0321 by Sophia Tim RN  Outcome: Ongoing  Goal: Ability to maintain an adequate cardiac output will improve  Description: Ability to maintain an adequate cardiac output will improve  4/20/2022 1041 by Davion Handy RN  Outcome: Progressing  4/20/2022 0321 by Sophia Tim RN  Outcome: Ongoing     Problem: Coping:  Goal: Verbalizations of decreased anxiety will decrease  Description: Verbalizations of decreased anxiety will decrease  4/20/2022 1041 by Davion Handy RN  Outcome: Progressing  4/20/2022 0321 by Sophia Tim RN  Outcome: Ongoing     Problem: Fluid Volume:  Goal: Risk for excess fluid volume will decrease  Description: Risk for excess fluid volume will decrease  4/20/2022 1041 by Davion Handy RN  Outcome: Progressing  4/20/2022 0321 by Sophia Tim RN  Outcome: Ongoing  Goal: Maintenance of adequate hydration will improve  Description: Maintenance of adequate hydration will improve  4/20/2022 1041 by Davion Handy RN  Outcome: Progressing  4/20/2022 0321 by Sophia Tim RN  Outcome: Ongoing  Goal: Will show no signs and symptoms of electrolyte imbalance  Description: Will show no signs and symptoms of electrolyte imbalance  4/20/2022 1041 by Davion Handy RN  Outcome: Progressing  4/20/2022 0321 by Sarai Whaley Heide Correa RN  Outcome: Ongoing     Problem: Health Behavior:  Goal: Ability to manage health-related needs will improve  Description: Ability to manage health-related needs will improve  4/20/2022 1041 by Nestor Grubbs RN  Outcome: Progressing  4/20/2022 0321 by Praveena Hart RN  Outcome: Ongoing  Goal: Ability to seek appropriate health care will improve  Description: Ability to seek appropriate health care will improve  4/20/2022 1041 by Nestor Grubbs RN  Outcome: Progressing  4/20/2022 0321 by Praveena Hart RN  Outcome: Ongoing     Problem: Nutritional:  Goal: Maintenance of adequate nutrition will improve  Description: Maintenance of adequate nutrition will improve  4/20/2022 1041 by Nestor Grubbs RN  Outcome: Progressing  4/20/2022 0321 by Praveena Hart RN  Outcome: Ongoing     Problem: Physical Regulation:  Goal: Complications related to the disease process, condition or treatment will be avoided or minimized  Description: Complications related to the disease process, condition or treatment will be avoided or minimized  4/20/2022 1041 by Nestor Grubbs RN  Outcome: Progressing  4/20/2022 0321 by Praveena Hart RN  Outcome: Ongoing     Problem: Respiratory:  Goal: Ability to maintain adequate ventilation will improve  Description: Ability to maintain adequate ventilation will improve  4/20/2022 1041 by Nestor Grubbs RN  Outcome: Progressing  4/20/2022 0321 by Praveena Hart RN  Outcome: Ongoing  4/20/2022 0225 by Red Glynn RCP  Outcome: Ongoing  Goal: Respiratory status will improve  Description: Respiratory status will improve  4/20/2022 1041 by Nestor Grubbs RN  Outcome: Progressing  4/20/2022 0321 by Praveena Hart RN  Outcome: Ongoing  4/20/2022 0225 by Red Glynn RCP  Outcome: Ongoing     Problem: Discharge Planning  Goal: Discharge to home or other facility with appropriate resources  Outcome: Progressing     Problem: Chronic Conditions and Co-morbidities  Goal: Patient's chronic conditions and co-morbidity symptoms are monitored and maintained or improved  Outcome: Progressing

## 2022-04-20 NOTE — PROGRESS NOTES
Noted referral for [address] [facility] nurse for \"pressure ulcers on back\"  To bedside with Mireya Nielson RN for skin inspection. Bariatric bed was set up with side bolsters extended and mattress set for weight. Using a PureWick with a brief to hold it in place. Some mild epidermal peeling is noted to skin of buttocks but no open wounds identified. Suggested keeping the brief off in bed and using the moisture wicking underpad. C/o discomfort to abdominal skin folds; zinc oxide paste had been applied. Patient relates she typically uses miconazole powder. Suggest avoiding cream in folds and  folds using a clean cotton pillowcase or DriGo HP sheets if available. Turn every 2 hours  Float heels off of bed with pillows under calves    Use lift sling to reposition patient to minimize potential for shear injury.     Routine incontinence care with incontinence barrier cloths and zinc oxide cream. Apply zinc oxide cream BID and prn to buttocks  Moisture wicking under pads

## 2022-04-20 NOTE — PROGRESS NOTES
Physical Therapy  Facility/Department: [facility]  Physical Therapy Daily Treatment Note    Name: Jose Palafox  : 1975  MRN: [id number]  Date of Service: 2022    Discharge Recommendations:  Patient would benefit from continued therapy after discharge   PT Equipment Recommendations  Equipment Needed: No  Other: pt currenlty requires significant physical assistance for all aspects of mobility      Patient Diagnosis(es): The encounter diagnosis was Acute on chronic congestive heart failure, unspecified heart failure type ([address] Past Medical History:  has a past medical history of Acute on chronic diastolic CHF (congestive heart failure) ([address] HIEN (acute kidney injury) ([address] HIEN (acute kidney injury) ([address] Asthma, CHF, Chronic obstructive lung disease ([address] Chronic respiratory failure with hypoxia ([address] COPD, Diabetes mellitus, new onset ([address] Former smoker, HTN, Hyperglycemia, Hyperlipidemia, Hypertension, Morbid obesity with BMI of 60.0-69.9, adult ([address] Neuromuscular disorder ([address] STEPH on CPAP, Oxygen dependent, Pedal edema, Pneumonia, Pulmonary hypertension, moderate to severe ([address] Torn meniscus, and Wears glasses. Past Surgical History:  has a past surgical history that includes  section (); Abdomen surgery; joint replacement; Cystoscopy (2019); Cystoscopy (N/A, 2019); hc cath power picc triple (2018); pr office/outpt visit,procedure only (N/A, 2018); Tracheotomy (2018); Gastrostomy tube placement (2018); and tracheostomy closure (2018). Assessment   Body Structures, Functions, Activity Limitations Requiring Skilled Therapeutic Intervention: Decreased functional mobility ; Decreased balance;Decreased ROM; Decreased strength;Decreased endurance; Increased pain  Assessment: Pt required Marychuy-modA  to perform bed mobility,  completed STS with Marychuy x2 from EOB to bariatric RW and maintained standing ~ 1 mins prior to amb.  Pt ambulated 12 steps forward and back with bariatric RW and Ike on high flow O2. Pt is currently unsafe to perform functional mobility unassisted and is expected to require continued skilled PT to address functional mobility and endurance deficits to return pt to prior level of function. Therapy Prognosis: Fair  Requires PT Follow-Up: Yes  Activity Tolerance  Activity Tolerance: Patient limited by fatigue;Patient limited by endurance  Activity Tolerance Comments: on high flow O2     Plan   Plan  Plan: 5-7 times per week  Current Treatment Recommendations: Strengthening,ROM,Functional mobility training,Transfer training,Endurance training,Gait training,Home exercise program,Safety education & training,Patient/Caregiver education & training  Safety Devices  Type of Devices: Call light within reach,Left in bed,Patient at risk for falls,Gait belt  Restraints  Restraints Initially in Place: No  Restraints: 4 bed rails per pt request     Restrictions  Restrictions/Precautions  Restrictions/Precautions: Fall Risk,Up as Tolerated,General Precautions  Required Braces or Orthoses?: No  Position Activity Restriction  Other position/activity restrictions: up w/assist. Hi-yair O2 . Subjective   General  Patient assessed for rehabilitation services?: Yes  Response To Previous Treatment: Patient with no complaints from previous session. Family / Caregiver Present: No  Follows Commands: Within Functional Limits  General Comment  Comments: Pt left in bed with call light within reach  Subjective  Subjective: Pt and RN agreeable to PT. Pt seated EOB upon arrival, on high flow O2 upon arrival, pleasant and cooperative throughout.  FISHER and FISHER student present in room at this writer's arrival  Pain Screening  Read Only-Patient Currently in Pain: Denies  Pain Assessment  Pain Assessment: 0-10  Pain Level: 5  Pain Location: Back;Leg  Pain Orientation: Left  Pain Descriptors: Sore  Functional Pain Assessment: Prevents or interferes some active activities and ADLs  Pain Type: Chronic pain  Pain Frequency: Intermittent  Pain Onset: On-going  Non-Pharmaceutical Pain Intervention(s): Ambulation/Increased Activity;Repositioned; Therapeutic presence  Response to Pain Intervention: Patient satisfied         Cognition   Orientation  Overall Orientation Status: Within Normal Limits     Objective     Balance  Sitting: Without support  Standing: With support  Transfer Training  Transfer Training: Yes  Overall Level of Assistance: Minimum assistance;Assist X2  Interventions: Verbal cues  Stand to Sit: Minimum assistance, Assist x2  Gait Training  Gait Training: Yes  Right Side Weight Bearing: Full  Left Side Weight Bearing: Full  Gait  Overall Level of Assistance: Minimum assistance;Assist X2  Interventions: Verbal cues  Base of Support: Widened  Speed/Marina: Shuffled; Slow  Distance (ft): 4 Feet  Assistive Device: Walker, rolling  Stairs - Level of Assistance:  (bariatric)  Bed mobility  Rolling to Left: Minimal assistance  Rolling to Right: Minimal assistance  Sit to Supine: Minimal assistance;2 Person assistance  Scooting: Minimal assistance  Comment: Increased time to complete  Transfers  Sit to Stand: Minimal Assistance;2 Person Assistance  Stand to sit: Minimal Assistance;2 Person Assistance  Comment: pt completed 1 STS transfers with bariatric RW from EOB, modA x2 for return to supine d/t pt's short stature and body habitus  Ambulation  Surface: level tile  Device: Rolling Walker  Assistance: Minimal assistance;2 Person assistance  Quality of Gait: waddling gait, short steps  Gait Deviations: Slow Marina; Increased NOEL; Decreased step length  Distance: 12 steps forward and back  Comments: pt very fatigued after amb, SpO2 >90% t/o mobility  More Ambulation?: No     Balance  Posture: Good  Sitting - Static: Fair;+  Sitting - Dynamic: Fair;+  Standing - Static: Fair  Comments: standing balance assessed with bariatric RW, pt able to maintain standing ~1 mins prior to amb  Exercise Treatment: pt deferred ther exs secondary to nausea    AM-PAC Score  AM-PAC Inpatient Mobility Raw Score : 13 (04/20/22 1602)  AM-PAC Inpatient T-Scale Score : 36.74 (04/20/22 1602)  Mobility Inpatient CMS 0-100% Score: 64.91 (04/20/22 1602)  Mobility Inpatient CMS G-Code Modifier : CL (04/20/22 1602)  Goals  Short Term Goals  Time Frame for Short term goals: 14  Short term goal 1: Pt to perform bed mobility Marychuy  Short term goal 2: Pt to attempt functional transfers Marychuy  Short term goal 3: Demonstrate standing balance of good - to decrease fall risk  Short term goal 4: Actively participate in 30 minutes of therapy to demo increased endurance  Short term goal 5: Pt to ambulate 10ft w/ RW Marychuy  Patient Goals   Patient goals :  To get well       Therapy Time   Individual Concurrent Group Co-treatment   Time In 4612         Time Out 1510         Minutes 25         Timed Code Treatment Minutes: [facility] [address]

## 2022-04-20 NOTE — PLAN OF CARE
Problem: Falls - Risk of:  Goal: Will remain free from falls  Description: Will remain free from falls  Outcome: Ongoing  Goal: Absence of physical injury  Description: Absence of physical injury  Outcome: Ongoing     Problem: Skin Integrity:  Goal: Will show no infection signs and symptoms  Description: Will show no infection signs and symptoms  Outcome: Ongoing  Goal: Absence of new skin breakdown  Description: Absence of new skin breakdown  Outcome: Ongoing     Problem: Nutrition  Goal: Optimal nutrition therapy  Outcome: Ongoing     Problem: Pain:  Goal: Pain level will decrease  Description: Pain level will decrease  Outcome: Ongoing  Goal: Control of acute pain  Description: Control of acute pain  Outcome: Ongoing  Goal: Control of chronic pain  Description: Control of chronic pain  Outcome: Ongoing     Problem: Discharge Planning:  Goal: Discharged to appropriate level of care  Description: Discharged to appropriate level of care  Outcome: Ongoing     Problem:  Activity:  Goal: Capacity to carry out activities will improve  Description: Capacity to carry out activities will improve  Outcome: Ongoing  Goal: Will verbalize the importance of balancing activity with adequate rest periods  Description: Will verbalize the importance of balancing activity with adequate rest periods  Outcome: Ongoing     Problem: Cardiac:  Goal: Hemodynamic stability will improve  Description: Hemodynamic stability will improve  Outcome: Ongoing  Goal: Ability to maintain an adequate cardiac output will improve  Description: Ability to maintain an adequate cardiac output will improve  Outcome: Ongoing     Problem: Coping:  Goal: Verbalizations of decreased anxiety will decrease  Description: Verbalizations of decreased anxiety will decrease  Outcome: Ongoing     Problem: Fluid Volume:  Goal: Risk for excess fluid volume will decrease  Description: Risk for excess fluid volume will decrease  Outcome: Ongoing  Goal: Maintenance of adequate hydration will improve  Description: Maintenance of adequate hydration will improve  Outcome: Ongoing  Goal: Will show no signs and symptoms of electrolyte imbalance  Description: Will show no signs and symptoms of electrolyte imbalance  Outcome: Ongoing     Problem: Health Behavior:  Goal: Ability to manage health-related needs will improve  Description: Ability to manage health-related needs will improve  Outcome: Ongoing  Goal: Ability to seek appropriate health care will improve  Description: Ability to seek appropriate health care will improve  Outcome: Ongoing     Problem: Nutritional:  Goal: Maintenance of adequate nutrition will improve  Description: Maintenance of adequate nutrition will improve  Outcome: Ongoing     Problem: Physical Regulation:  Goal: Complications related to the disease process, condition or treatment will be avoided or minimized  Description: Complications related to the disease process, condition or treatment will be avoided or minimized  Outcome: Ongoing     Problem: Respiratory:  Goal: Ability to maintain adequate ventilation will improve  Description: Ability to maintain adequate ventilation will improve  4/20/2022 0321 by David Lawson RN  Outcome: Ongoing  4/20/2022 0225 by Tiburcio Gosselin, RCP  Outcome: Ongoing  Goal: Respiratory status will improve  Description: Respiratory status will improve  4/20/2022 0321 by David Lawson RN  Outcome: Ongoing  4/20/2022 0225 by Tiburcio Gosselin, RCP  Outcome: Ongoing

## 2022-04-20 NOTE — PROGRESS NOTES
Occupational Therapy  Facility/Department: [facility]  Occupational Therapy Daily Treatment Note    Name: Yissel Gotti  : 1975  MRN: [id number]  Date of Service: 2022    Discharge Recommendations:  Patient would benefit from continued therapy after discharge to improve endurance, balance, strength and independence. Assessment   Performance deficits / Impairments: Decreased functional mobility ; Decreased balance;Decreased ADL status; Decreased endurance;Decreased strength;Decreased posture;Decreased high-level IADLs;Decreased safe awareness  Prognosis: Fair  REQUIRES OT FOLLOW-UP: Yes  Activity Tolerance  Activity Tolerance: Patient Tolerated treatment well;Patient limited by fatigue  Activity Tolerance: Pt limited to increase nausea        Plan   Plan  Times per Week: 3-4x  Current Treatment Recommendations: Strengthening,Balance Training,Functional Mobility Training,Self-Care / ADL,Safety Education & Training,Endurance Training     Restrictions  Restrictions/Precautions  Restrictions/Precautions: Fall Risk,Up as Tolerated,General Precautions  Required Braces or Orthoses?: No  Position Activity Restriction  Other position/activity restrictions: up w/assist. Hi-yair O2 . Subjective   General  Chart Reviewed: Yes  Patient assessed for rehabilitation services?: Yes  Response to previous treatment: Patient with no complaints from previous session  Family / Caregiver Present: No  General Comment  Comments: Pt and RN agreeable to therapy this day. Pt supine in bed start of session. Retired supine in bed end of session with call light in reach, and all needs met.   Pain Assessment  Pain Assessment: 0-10  Pain Level: 5  Pain Location: Back;Leg  Pain Orientation: Left  Pain Descriptors: Sore  Functional Pain Assessment: Prevents or interferes some active activities and ADLs  Pain Type: Chronic pain  Pain Frequency: Intermittent  Pain Onset: On-going  Non-Pharmaceutical Pain Intervention(s): Ambulation/Increased Activity;Repositioned; Therapeutic presence  Response to Pain Intervention: Patient satisfied  Pain:Pt did not c/o of pain but did c/o of increased nausea throughout session. Objective              Balance  Sitting Balance: Stand by assistance (utilizing stool under feet for stability, Pt tolerated approx 16 min of dynamic/static sitting EOB)  Standing Balance: Minimal assistance (Mix x1, second person for safety.)  Standing Balance  Time: approx 2-3 min  Activity: static/dynamic, mobility  Comment: utilizing bariatric RW  Functional Mobility  Functional - Mobility Device: Other (Bariatric RW)  Activity: Other  Assist Level: Minimal assistance (Min x2)  Functional Mobility Comments: Utilizing bariatric RW, Pt simulated household distances. ADL  Grooming: Setup;Stand by assistance; Increased time to complete (oral care and face washing.)  Additional Comments: Pt completed oral care and face washing seated EOB with SBA with stool under feet for stability. Pt declined bathing, noted completed yesterday. Pt limited throughout session d/t fatigue and increased nausea. Education:Throughout session Pt educated on OT role, RW management, proper hand placement, importance of activity. Activity Tolerance  Activity Tolerance: Patient limited by fatigue;Patient limited by endurance  Activity Tolerance Comments: on high flow O2  Bed mobility  Rolling to Right: Minimal assistance  Supine to Sit: Contact guard assistance  Sit to Supine: Minimal assistance (Min x2)  Scooting: Minimal assistance  Comment: 1 trial of rolling completed with Pt req Min A. Supine > Sit with CGA for safety, increased time needed. Sit > Supine with Min x2, assist for BLE into bed.   Transfers  Sit to stand: Minimal assistance (Min x2)  Stand to sit: Minimal assistance (Min x2)  Transfer Comments: Utilizing bariatric RW, v/c for proper hand placement   Safety: Pt left in bed,call light in reach, Gait belt, nurse notified Cognition  Overall Cognitive Status: [facility]                                                                                      AM-PAC Score        AM-PAC Inpatient Daily Activity Raw Score: 16 (04/20/22 1532)  AM-PAC Inpatient ADL T-Scale Score : 35.96 (04/20/22 1532)  ADL Inpatient CMS 0-100% Score: 53.32 (04/20/22 1532)  ADL Inpatient CMS G-Code Modifier : CK (04/20/22 1532)    Goals  Short Term Goals  Time Frame for Short term goals: Patient will, by discharge. Short Term Goal 1: Demo baseline grooming tasks with Mod I  Short Term Goal 2: Demo 15+ minutes of functional activity tolerance to engage in ADLs  Short Term Goal 3: Demo 4+/5 gross muscle grade in B UE to improve performance in functional tasks  Short Term Goal 4: Demo pursed lip breathing techniques with 1 VC for ADL completion  Short Term Goal 5: Demo functional bed mobility with min A to engage in functional tasks  Short Term Goal 6: Demo Mod A for functional transfers and functional mobility with LRD for improved independence with ADLs/IADLs (goal added by HANDY Louis/MARION in collaboration with SHLOMO Guillaume on 04/14/2022)  Short Term Goal 7: Notify OTR to update POC as patient progresses  Patient Goals   Patient goals :  To return home       Therapy Time   Individual Concurrent Group Co-treatment   Time In 0462 [id number]   Time Out 1445     1504   Minutes 20     19   Timed Code Treatment Minutes: 23 Minutes (Co-Treat with PTA)       MANISHA Branham  Supervised by Donte KEENAN

## 2022-04-21 NOTE — CARE COORDINATION
Spoke to Alysha at [facility].  He relates that appeal will most likely take until Monday before we have an answer

## 2022-04-21 NOTE — PROGRESS NOTES
PULMONARY & CRITICAL CARE MEDICINE PROGRESS NOTE     Patient:  Anita Retana  MRN: [id number]  Admit date: 2022  Primary Care Physician: Lewis Washington DO  Consulting Physician: Bina Fuentes MD  CODE Status: Full Code  LOS: 13     SUBJECTIVE     CHIEF COMPLAINT/REASON FOR INITIAL CONSULT: Acute on chronic respiratory failure    BRIEF HOSPITAL COURSE:  The patient is a 55 y.o. 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 Patient was discharged home recently on supplemental oxygen and insurance had not approved trilogy so she was sent home with her home CPAP. Patient was brought back to the hospital because she was having worsening shortness of breath. Unable to use her CPAP because of fatigue. On arrival EMS found that her saturation was in mid [de-identified]. She was placed on nonrebreather brought to the ER. Pulmonary has been consulted because of high flow requirement and hypoxia. Patient is laying in bed, she is on high flow oxygen 80%, using BiPAP at night. Her BNP was elevated. She is under going diuresis,   On Symbicort, Spiriva and albuterol. INTERVAL HISTORY:  22  Patient remains on high flow nasal cannula O2 Flow Rate (L/min)  Av L/min  Min: 25 L/min  Max: 25 L/min, FiO2 60%  She was on BiPAP 16/10 overnight  Denies any new complaints  Afebrile, hemodynamically stable    REVIEW OF SYSTEMS:  Review of Systems   Constitutional: Positive for fatigue. Negative for fever. HENT: Negative for voice change. Eyes: Negative for discharge and visual disturbance. Respiratory: Positive for shortness of breath. Gastrointestinal: Negative for abdominal pain, diarrhea and vomiting. Genitourinary: Negative for dysuria and urgency. Musculoskeletal: Negative for joint swelling. Allergic/Immunologic: Negative for environmental allergies and immunocompromised state. Neurological: Positive for weakness. Hematological: Negative for adenopathy. Does not bruise/bleed easily. Psychiatric/Behavioral: Negative for behavioral problems. OBJECTIVE     PaO2/FiO2 RATIO:  No results for input(s): POCPO2 in the last 72 hours. FiO2 : 60 %     VITAL SIGNS:   LAST:  BP (!) 99/58   Pulse 77   Temp 98.7 °F (37.1 °C) (Oral)   Resp 20   Ht 5' 6\" (1.676 m)   Wt (!) 334 lb 3.5 oz (151.6 kg)   SpO2 92%   BMI 53.94 kg/m²   8-24 HR RANGE:  TEMP Temp  Av.5 °F (36.9 °C)  Min: 98 °F (36.7 °C)  Max: 99 °F (74.5 °C)   BP Systolic (65CTB), QOV:027 , Min:99 , CVP:244      Diastolic (60AKL), DGO:26, Min:57, Max:72     PULSE Pulse  Av.4  Min: 77  Max: 90   RR Resp  Av  Min: 20  Max: 20   O2 SAT SpO2  Av %  Min: 92 %  Max: 92 %   OXYGEN DELIVERY O2 Flow Rate (L/min)  Av L/min  Min: 25 L/min  Max: 25 L/min         Physical Exam  Constitutional:       General: She is awake. Appearance: She is morbidly obese. She is ill-appearing. HENT:      Head: Normocephalic and atraumatic. Eyes:      General: No scleral icterus. Conjunctiva/sclera: Conjunctivae normal.      Pupils: Pupils are equal, round, and reactive to light. Cardiovascular:      Rate and Rhythm: Normal rate and regular rhythm. Heart sounds: No murmur heard. Pulmonary:      Effort: Prolonged expiration present. No accessory muscle usage or respiratory distress. Breath sounds: Decreased air movement present. Abdominal:      General: Bowel sounds are normal.      Palpations: Abdomen is soft. Tenderness: There is no abdominal tenderness. Musculoskeletal:      Cervical back: Neck supple. Neurological:      General: No focal deficit present. Mental Status: She is alert.        DATA REVIEW     Medications: Current Inpatient  Scheduled Meds:   potassium chloride  20 mEq Oral BID WC    lactobacillus  1 capsule Oral Daily with breakfast    furosemide  40 mg IntraVENous BID    lidocaine  1 patch TransDERmal Daily  insulin lispro  0-18 Units SubCUTAneous TID     insulin lispro  0-9 Units SubCUTAneous Nightly    sertraline  100 mg Oral Daily    albuterol  2.5 mg Nebulization 4x daily    amLODIPine  5 mg Oral Daily    atorvastatin  40 mg Oral Nightly    budesonide-formoterol  2 puff Inhalation BID    ferrous sulfate  325 mg Oral BID WC    folic acid  1 mg Oral Daily    hydrALAZINE  25 mg Oral 3 times per day    isosorbide dinitrate  20 mg Oral TID    pantoprazole  40 mg Oral QAM AC    spironolactone  25 mg Oral Daily    tiotropium  2 puff Inhalation Daily    vitamin B-12  1,000 mcg Oral Daily    sodium chloride flush  10 mL IntraVENous 2 times per day    heparin (porcine)  5,000 Units SubCUTAneous 3 times per day    gabapentin  300 mg Oral TID     Continuous Infusions:   sodium chloride Stopped (04/09/22 0636)    dextrose         INPUT/OUTPUT:  In: 890 [P.O.:890]  Out: -   Date 04/21/22 0000 - 04/21/22 2359   Shift 5699-9784 7877-5831 5221-2256 24 Hour Total   INTAKE   P.O.(mL/kg/hr)  240  240   Shift Total(mL/kg)  240(1.6)  240(1.6)   OUTPUT   Shift Total(mL/kg)       Weight (kg) 151.6 151.6 151.6 151.6        LABS:  ABGs:   No results for input(s): POCPH, POCPCO2, POCPO2, POCHCO3, NKOZ6MRM in the last 72 hours. CBC:   No results for input(s): WBC, HGB, HCT, MCV, PLT, LABLYMP, MID, GRAN, LYMPHOPCT, MIDPERCENT, GRANULOCYTES, RBC, MCH, MCHC, RDW in the last 72 hours. Invalid input(s): RELATIVEPERCENT  CRP:   No results for input(s): CRP in the last 72 hours. LDH:   No results for input(s): LDH in the last 72 hours. BMP:   Recent Labs     04/18/22  1546 04/19/22  0428 04/20/22  0542 04/21/22  0223   NA  --  141 136 136   K 3.7 3.7 3.0* 3.4*   CL  --  92* 90* 93*   CO2  --  33* 32* 34*   BUN  --  21* 19 17   CREATININE  --  1.13* 1.29* 1.18*   GLUCOSE  --  112* 94 105*     Liver Function Test:   No results for input(s): PROT, LABALBU, ALT, AST, GGT, ALKPHOS, BILITOT in the last 72 hours.   Coagulation Profile:   No results for input(s): INR, PROTIME, APTT in the last 72 hours. D-Dimer:  No results for input(s): DDIMER in the last 72 hours. Lactic Acid:  No results for input(s): LACTA in the last 72 hours. Cardiac Enzymes:  No results for input(s): CKTOTAL, CKMB, CKMBINDEX, TROPONINI in the last 72 hours. Invalid input(s): TROPONIN, HSTROP  BNP/ProBNP:   No results for input(s): BNP, PROBNP in the last 72 hours. Triglycerides:  No results for input(s): TRIG in the last 72 hours. Microbiology:  Urine Culture:  No components found for: CURINE  Blood Culture:  No components found for: CBLOOD, CFUNGUSBL  Sputum Culture:  No components found for: CSPUTUM  No results for input(s): SPECDESC, SPECIAL, CULTURE, STATUS, ORG, CDIFFTOXPCR, CAMPYLOBPCR, SALMONELLAPC, SHIGAPCR, SHIGELLAPCR, MPNEUG, MPNEUM, LACTOQL in the last 72 hours. No results for input(s): SPUTUM, SPECDESC, SPECIAL, CULTURE, STATUS, ORG, CDIFFTOXPCR, MPNEUM, MPNEUG in the last 72 hours. Invalid input(s): Aubery Marylou, CFUNGUSBL     Pathology:    Radiology Reports:  XR CHEST PORTABLE   Final Result   *Stable cardiomegaly with mild central vascular congestion. *Interval increased right perihilar/infrahilar opacity which may represent   developing pneumonia. XR CHEST PORTABLE   Final Result   Limited negative portable chest radiograph. Echocardiogram:   Results for orders placed during the hospital encounter of 03/18/22    ECHO Complete 2D W Doppler W Color    Narrative    Summary  Left ventricle is normal in size. Global left ventricular systolic function is normal. Calculated ejection  fraction 60% by Heart Model. Marked Leftward compression of inter-ventricular septum (\"D-sign\")  indicating RV volume or pressure overload. Right ventricular function appears reduced. Moderately dilated right  ventricular cavity. Moderate to severe tricuspid regurgitation. Severe pulmonary hypertension.  Estimated right ventricular systolic pressure  is 96PODW. Trivial pulmonic insufficiency. ASSESSMENT AND PLAN     Assessment:    // Acute on chronic hypoxic/hypercapnic respiratory failure  // Chronic obstructive pulmonary disease  // Diastolic CHF  // Severe pulmonary hypertension  // Chronic cor pulmonale  // Morbid obesity  // Obstructive sleep apnea  // Obesity hypoventilation syndrome    Plan:    I personally interviewed/examined the patient; reviewed interval history, interpreted all available radiographic and laboratory data at the time of service.  Patient remains on high flow nasal cannula, FiO2 60%, flow rate 25 L/min   Wean FiO2/flow rate as tolerated   Continue supplemental oxygen to keep oxygen saturation >90%   Continue nocturnal and as needed BiPAP   She will benefit from home NIV (trilogy) at the time of discharge   Encourage incentive spirometry/Acapella   Continue pulmonary toilet, aspiration precautions and bronchodilators   Continue diuresis with Lasix/Aldactone   Monitor I/O, electrolytes with a goal of even/negative fluid balance   Tolerating oral diet   Stress ulcer prophylaxis   Chemical DVT prophylaxis; heparin   Antimicrobials reviewed; completed 7-day course of Levaquin on 4/17   Completed prednisone taper   Physical/occupational therapy   Overall prognosis guarded   Awaits placement to LTAC    I would like to thank you for allowing me to participate in the care of this patient. Please feel free to call with any further questions or concerns. Kay De Los Santos MD  Pulmonary and Critical Care Medicine           4/21/2022, 1:05 PM    Please note that this chart was generated using voice recognition Dragon dictation software. Although every effort was made to ensure the accuracy of this automated transcription, some errors in transcription may have occurred.

## 2022-04-21 NOTE — PLAN OF CARE
Problem: Falls - Risk of:  Goal: Will remain free from falls  Description: Will remain free from falls  4/21/2022 1032 by Stacie Jaramillo RN  Outcome: Progressing  4/21/2022 0410 by Romeo Schilling RN  Outcome: Progressing  Goal: Absence of physical injury  Description: Absence of physical injury  4/21/2022 1032 by Stacie Jaramillo RN  Outcome: Progressing  4/21/2022 0410 by Romeo Schilling RN  Outcome: Progressing     Problem: Skin Integrity:  Goal: Will show no infection signs and symptoms  Description: Will show no infection signs and symptoms  4/21/2022 1032 by Stacie Jaramillo RN  Outcome: Progressing  4/21/2022 0410 by Romeo Schilling RN  Outcome: Progressing  Goal: Absence of new skin breakdown  Description: Absence of new skin breakdown  4/21/2022 1032 by Stacie Jaramillo RN  Outcome: Progressing  4/21/2022 0410 by Romeo Schilling RN  Outcome: Progressing     Problem: Nutrition  Goal: Optimal nutrition therapy  4/21/2022 1032 by Stacie Jaramillo RN  Outcome: Progressing  4/21/2022 0410 by Romeo Schilling RN  Outcome: Progressing     Problem: Pain:  Goal: Pain level will decrease  Description: Pain level will decrease  4/21/2022 1032 by Stacie Jaramillo RN  Outcome: Progressing  4/21/2022 0410 by Romeo Schilling RN  Outcome: Progressing  Goal: Control of acute pain  Description: Control of acute pain  4/21/2022 1032 by Stacie Jaramillo RN  Outcome: Progressing  4/21/2022 0410 by Romeo Schilling RN  Outcome: Progressing  Goal: Control of chronic pain  Description: Control of chronic pain  4/21/2022 1032 by Stacie Jaramillo RN  Outcome: Progressing  4/21/2022 0410 by Romeo Schilling RN  Outcome: Progressing     Problem: Discharge Planning:  Goal: Discharged to appropriate level of care  Description: Discharged to appropriate level of care  4/21/2022 1032 by Stacie Jaramillo RN  Outcome: Progressing  4/21/2022 0410 by Romeo Schilling RN  Outcome: Progressing     Problem:  Activity:  Goal: Capacity to carry out activities will improve  Description: Capacity to carry out activities will improve  4/21/2022 1032 by Stephane Glover RN  Outcome: Progressing  4/21/2022 0410 by Caryl Rodriguez RN  Outcome: Progressing  Goal: Will verbalize the importance of balancing activity with adequate rest periods  Description: Will verbalize the importance of balancing activity with adequate rest periods  4/21/2022 1032 by Stephane Glover RN  Outcome: Progressing  4/21/2022 0410 by Caryl Rodriguez RN  Outcome: Progressing     Problem: Cardiac:  Goal: Hemodynamic stability will improve  Description: Hemodynamic stability will improve  4/21/2022 1032 by Stephane Glover RN  Outcome: Progressing  4/21/2022 0410 by Caryl Rodriguez RN  Outcome: Progressing  Goal: Ability to maintain an adequate cardiac output will improve  Description: Ability to maintain an adequate cardiac output will improve  4/21/2022 1032 by Stephane Glover RN  Outcome: Progressing  4/21/2022 0410 by Caryl Rodriguez RN  Outcome: Progressing     Problem: Coping:  Goal: Verbalizations of decreased anxiety will decrease  Description: Verbalizations of decreased anxiety will decrease  4/21/2022 1032 by Stephane Glover RN  Outcome: Progressing  4/21/2022 0410 by Caryl Rodriguez RN  Outcome: Progressing     Problem: Fluid Volume:  Goal: Risk for excess fluid volume will decrease  Description: Risk for excess fluid volume will decrease  4/21/2022 1032 by Stephane Glover RN  Outcome: Progressing  4/21/2022 0410 by Caryl Rodriguez RN  Outcome: Progressing  Goal: Maintenance of adequate hydration will improve  Description: Maintenance of adequate hydration will improve  4/21/2022 1032 by Stephane Glover RN  Outcome: Progressing  4/21/2022 0410 by Caryl Rodriguez RN  Outcome: Progressing  Goal: Will show no signs and symptoms of electrolyte imbalance  Description: Will show no signs and symptoms of electrolyte imbalance  4/21/2022 1032 by Stephane Glover RN  Outcome: Progressing  4/21/2022 0410 by Olivia Prieto RN  Outcome: Progressing     Problem: Health Behavior:  Goal: Ability to manage health-related needs will improve  Description: Ability to manage health-related needs will improve  4/21/2022 1032 by Amanda Montalvo RN  Outcome: Progressing  4/21/2022 0410 by Olivia Prieto RN  Outcome: Progressing  Goal: Ability to seek appropriate health care will improve  Description: Ability to seek appropriate health care will improve  4/21/2022 1032 by Amanda Montalvo RN  Outcome: Progressing  4/21/2022 0410 by Olivia Prieto RN  Outcome: Progressing     Problem: Nutritional:  Goal: Maintenance of adequate nutrition will improve  Description: Maintenance of adequate nutrition will improve  4/21/2022 1032 by Amanda Montalvo RN  Outcome: Progressing  4/21/2022 0410 by Olivia Prieto RN  Outcome: Progressing     Problem: Physical Regulation:  Goal: Complications related to the disease process, condition or treatment will be avoided or minimized  Description: Complications related to the disease process, condition or treatment will be avoided or minimized  4/21/2022 1032 by Amanda Montavlo RN  Outcome: Progressing  4/21/2022 0410 by Olivia Prieto RN  Outcome: Progressing     Problem: Respiratory:  Goal: Ability to maintain adequate ventilation will improve  Description: Ability to maintain adequate ventilation will improve  4/21/2022 1032 by Amanda Montalvo RN  Outcome: Progressing  4/21/2022 0410 by Olivia Prieto RN  Outcome: Progressing  Goal: Respiratory status will improve  Description: Respiratory status will improve  4/21/2022 1032 by Amanda Montalvo RN  Outcome: Progressing  4/21/2022 0410 by Olivia Prieto RN  Outcome: Progressing     Problem: Discharge Planning  Goal: Discharge to home or other facility with appropriate resources  4/21/2022 1032 by Amanda Montalvo RN  Outcome: Progressing  4/21/2022 0410 by Olivia Prieto RN  Outcome: Progressing     Problem: Chronic Conditions and Co-morbidities  Goal: Patient's chronic conditions and co-morbidity symptoms are monitored and maintained or improved  4/21/2022 1032 by Edmond Alavrado RN  Outcome: Progressing  4/21/2022 0410 by Maame Zamora RN  Outcome: Progressing

## 2022-04-21 NOTE — PROGRESS NOTES
Occupational [address]  Occupational Therapy Not Seen Note    DATE: 2022    NAME: Fanta Mueller  MRN: [id number]   : 1975      Patient not seen this date for Occupational Therapy due to: Other: Pt stated just getting back into bed, getting ready to receive morning meds and breakfast just arrived.     Next Scheduled Treatment:     Electronically signed by SHLOMO Covington on 2022 at 11:20 AM

## 2022-04-21 NOTE — PROGRESS NOTES
[facility]  Internal Medicine Teaching Residency Program  Inpatient Daily Progress Note  ______________________________________________________________________________    Patient: Jose Palafox  YOB: 1975   URV:[id number]    Acct: [de-identified]     Room: 2004/2004-01  Admit date: 4/8/2022  Today's date: 04/21/22  Number of days in the hospital: 13    SUBJECTIVE   Admitting Diagnosis: <principal problem not specified>  CC: SOB  Pt examined at bedside. Chart & results reviewed. No acute events overnight. Patient is afebrile and is hemodynamically stable. Blood pressure on the soft side. No more episodes of emesis. Stool studies not done yet. Patient is saturating 96% on heated high flow nasal cannula with 50-->60% FiO2 on 25 L/min flow rate. On diuresis with Lasix 40 Mg IV twice daily with urine output of 650 L. Awaiting SNF/LTAC placement. ROS:  Constitutional:  negative for chills, fevers, sweats  Respiratory:  negative for cough, wheezing, hemoptysis  Cardiovascular: lower extremity edema, palpitations  Gastrointestinal: Positive for diarrhea, negative for abdominal pain, constipation,  nausea, vomiting  Neurological:  negative for dizziness, headache    BRIEF HISTORY     The patient is a pleasant 55 y.o. 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 Patient states she has had increased shortness of breath the past 2 nights, unable to wear her CPAP nightly with increased fatigue. Patient is poor historian, states she wears 7 L O2 at home, however EMS on arrival saw patient on 4 L and was saturating in the mid 80s. Patient was placed on nonrebreather by EMS and saturations improved to 98%.     Of note, patient had previous hospitalization earlier this month for COPD exacerbation complicated by pneumonia. At that time patient was treated with antibiotics and steroids. Pulmonology was consulted and patient was approved for trilogy noninvasive ventilator which she has yet to receive. Echocardiogram at that time showed EF 60%, \"D\" sign indicating RV pressure overload, moderate to severe MR, severe pulmonary hypertension. Patient is aware of these findings.     On my evaluation, patient resting comfortably on 15 L nonrebreather mask acute distress. Patient is morbidly obese with BMI 57. Denies any cough, chest pain, leg pain/swelling. She does have skin irritation on her buttocks. States she has been compliant with all meds (inculding bronchodilators and bumex 2mg BID) except for her PO DM meds as those were held on previous admission. Afebrile, hemodynamic stable, tachycardic in the 110s at present. Patient received prednisone and lasix 40mg IV in ED as well as rocephin and vancomycin. CXR in ED - limited negative CXR d/t body habitus.     Significant labs include proBNP on admission 4192, was in 1000s on recent discharge. Leukocytosis of 17.0. Hemoglobin 9.7. Slight bump in creatinine from baseline- 1.21. glucose 238.       4/10/2022-episode of hypoxia, patient drank 3.5 L of fluid admitted with CHF exacerbation, getting a chest x-ray, IV diuretics to continue, labs de-escalate, prednisone discontinued. 4/11/2022 - Patient has drank 2.5 L in the last 24 hours. Admitted with CHF exacerbation, currently on IV Lasix 80 mg IV twice daily. Did receive this morning. We will get a chest x-ray to look for any worsening pulmonary edema. Mild edema bilateral lower extremities noted. Labs Descalated, prednisone discontinued    4/13/2022 - Overnight BG went up to 423 and then insulin Lantus was increased to 20 nightly.     4/14/2022 -Lasix changed from 80 mg to 40 mg IV twice daily       OBJECTIVE     Vital Signs:  BP (!) 99/58   Pulse 92   Temp 98.7 °F (37.1 °C) (Oral)   Resp 20   Ht 5' 6\" (1.676 m)   Wt (!) 334 lb 3.5 oz (151.6 kg)   SpO2 96%   BMI 53.94 kg/m² Temp (24hrs), Av.5 °F (36.9 °C), Min:98 °F (36.7 °C), Max:99 °F (37.2 °C)    In: 1250   Out: -     Physical Exam:  Constitutional: This is a well developed, well nourished, Greater than 40 - Morbid Obesity / Extreme Obesity / Grade III 55y.o. year old female who is alert, oriented, cooperative and in no apparent distress. Head:normocephalic and atraumatic. On high flow nasal cannula  EENT:  PERRLA. No conjunctival injections. Septum was midline, mucosa was without erythema, exudates or cobblestoning. No thrush was noted. Neck: Supple without thyromegaly. No elevated JVP. Trachea was midline. Respiratory: Breath sounds clear to auscultation bilaterally. No crackles, wheezes or rales. Cardiovascular: S1-S2 heard which are regular without murmur, clicks, gallops or rubs. Abdomen: Abdomen distended, bowel sounds present. No organomegaly. Lymphatic: No lymphadenopathy. Musculoskeletal: Normal curvature of the spine. No gross muscle weakness. Extremities: Bilateral lower extremity edema improved. Skin:  Warm and dry. Good color, turgor and pigmentation. No lesions or scars.   No cyanosis or clubbing  Neurological/Psychiatric: The patient's general behavior, level of consciousness, thought content and emotional status is normal.        Medications:  Scheduled Medications:    potassium chloride  40 mEq Oral Once    potassium chloride  20 mEq Oral BID WC    lactobacillus  1 capsule Oral Daily with breakfast    furosemide  40 mg IntraVENous BID    lidocaine  1 patch TransDERmal Daily    insulin lispro  0-18 Units SubCUTAneous TID     insulin lispro  0-9 Units SubCUTAneous Nightly    sertraline  100 mg Oral Daily    albuterol  2.5 mg Nebulization 4x daily    amLODIPine  5 mg Oral Daily    atorvastatin  40 mg Oral Nightly    budesonide-formoterol  2 puff Inhalation BID    ferrous sulfate  325 mg Oral BID WC    folic acid  1 mg Oral Daily    hydrALAZINE  25 mg Oral 3 times per day    isosorbide dinitrate  20 mg Oral TID    pantoprazole  40 mg Oral QAM AC    spironolactone  25 mg Oral Daily    tiotropium  2 puff Inhalation Daily    vitamin B-12  1,000 mcg Oral Daily    sodium chloride flush  10 mL IntraVENous 2 times per day    heparin (porcine)  5,000 Units SubCUTAneous 3 times per day    gabapentin  300 mg Oral TID     Continuous Infusions:    sodium chloride Stopped (04/09/22 0636)    dextrose       PRN Medicationsloperamide, 2 mg, 4x Daily PRN  calcium carbonate, 500 mg, TID PRN  oxyCODONE-acetaminophen, 1 tablet, Q6H PRN  acetaminophen, 650 mg, Q4H PRN  sodium chloride, 1 spray, PRN  albuterol sulfate HFA, 2 puff, Q6H PRN  diazePAM, 5 mg, Q12H PRN  sodium chloride flush, 10 mL, PRN  sodium chloride, , PRN  magnesium sulfate, 1,000 mg, PRN  ondansetron, 4 mg, Q8H PRN   Or  ondansetron, 4 mg, Q6H PRN  polyethylene glycol, 17 g, Daily PRN  glucose, 15 g, PRN  dextrose, 12.5 g, PRN  glucagon (rDNA), 1 mg, PRN  dextrose, 100 mL/hr, PRN        Diagnostic Labs:  CBC:   No results for input(s): WBC, RBC, HGB, HCT, MCV, RDW, PLT in the last 72 hours. BMP:   Recent Labs     04/19/22  0428 04/20/22  0542 04/21/22  0223    136 136   K 3.7 3.0* 3.4*   CL 92* 90* 93*   CO2 33* 32* 34*   BUN 21* 19 17   CREATININE 1.13* 1.29* 1.18*     BNP: No results for input(s): BNP in the last 72 hours. PT/INR: No results for input(s): PROTIME, INR in the last 72 hours. APTT: No results for input(s): APTT in the last 72 hours. CARDIAC ENZYMES: No results for input(s): CKMB, CKMBINDEX, TROPONINI in the last 72 hours. Invalid input(s): CKTOTAL;3  FASTING LIPID PANEL:  Lab Results   Component Value Date    CHOL 144 11/17/2018    HDL 42 10/07/2020    TRIG 68 11/17/2018     LIVER PROFILE:   No results for input(s): AST, ALT, ALB, BILIDIR, BILITOT, ALKPHOS in the last 72 hours.    MICROBIOLOGY:   Lab Results   Component Value Date/Time    CULTURE NO GROWTH 5 DAYS 04/09/2022 02:22 AM       Imaging:    XR CHEST PORTABLE    Result Date: 4/8/2022  Limited negative portable chest radiograph. ECHO Complete 2D W Doppler W Color    Result Date: 3/22/2022  Transthoracic Echocardiography Report (TTE)  Patient Name PRICE       Date of Study               03/22/2022               Teja Sitter   Date of      1975  Gender                      Female  Birth   Age          55 year(s)  Race                        Black   Room Number  2008        Height:                     65.98 inch, 167.6 cm   Corporate ID [id number]    Weight:                     380 pounds, 172.4 kg  #   Patient Acct [de-identified]   BSA:          2.63 m^2      BMI:      61.36  #                                                              kg/m^2   MR #         [id number]     [facility]   Accession #  [id number]  Interpreting Physician      [address] [place] [address]   Fellow                   Referring Nurse                           Practitioner   Interpreting             Referring Physician         [facility]  Fellow  Additional Comments Technically difficult study. Type of Study   TTE procedure:2D Echocardiogram, M-Mode, Doppler, Color Doppler. Procedure Date Date: 03/22/2022 Start: 03:37 PM Study Location: [facility] Technical Quality: Adequate visualization Indications:Dyspnea/SOB. History / Tech. Comments: Echo done at patient bedside. Procedure explained to patient. HTN, COPD, PHTN, STEPH, HX NSTEMI Patient Status: Inpatient Height: 65.98 inches Weight: 380 pounds BSA: 2.63 m^2 BMI: 61.36 kg/m^2 Allergies   - Aspirin.   - Sulfa. CONCLUSIONS Summary Left ventricle is normal in size. Global left ventricular systolic function is normal. Calculated ejection fraction 60% by Heart Model. Marked Leftward compression of inter-ventricular septum (\"D-sign\") indicating RV volume or pressure overload. Right ventricular function appears reduced. Moderately dilated right ventricular cavity. Moderate to severe tricuspid regurgitation. Severe pulmonary hypertension. Estimated right ventricular systolic pressure is 08OVLF. Trivial pulmonic insufficiency. Signature ----------------------------------------------------------------------------  Electronically signed by Juliet Smith(Sonographer) on 03/22/2022  04:14 PM ---------------------------------------------------------------------------- ----------------------------------------------------------------------------  Electronically signed by Don Ellington(Interpreting physician) on 03/23/2022  11:48 AM ---------------------------------------------------------------------------- FINDINGS Left Atrium Left atrium is normal in size. Left Ventricle Left ventricle is normal in size. Global left ventricular systolic function is normal. Calculated ejection fraction 60% by Heart Model. Marked Leftward compression of inter-ventricular septum (\"D-sign\") indicating RV volume or pressure overload. Right Atrium Right atrium is normal in size. Right Ventricle Right ventricular function appears reduced. Moderately dilated right ventricular cavity. Mitral Valve Normal mitral valve structure and function. No mitral regurgitation. Aortic Valve Aortic valve is trileaflet and opens adequately. No aortic insufficiency. Tricuspid Valve No obvious valvular abnormality. Moderate to severe tricuspid regurgitation. Severe pulmonary hypertension. Estimated right ventricular systolic pressure is 71MTVO. Pulmonic Valve The pulmonic valve is normal in structure. Trivial pulmonic insufficiency. Pericardial Effusion No pericardial effusion seen. Miscellaneous E/E' average = 7.1. IVC normal diameter & inspiratory collapse indicating normal RA filling pressure .  M-mode / 2D Measurements & Calculations:   LVIDd:3.4 cm(3.7 - 5.6 cm)       Diastolic MDOLIK:00.6 ml  YZKUI:9.8 cm(2.2 - 4.0 cm)       Systolic LRYKCE:25.5 ml  SJQE:0.9 cm(0.6 - 1.1 cm)        Aortic Root:2.8 cm(2.0 - 3.7 cm)  LVPWd:1 cm(0.6 - 1.1 cm)         LA Dimension: 2. 4 cm(1.9 - 4.0 cm)  Fractional Shortenin.24 %    LA volume/Index: 47.93 ml /18m^2  Calculated LVEF (%): 60.84 %     LVOT:1.7 cm                                   RVDd:4.91 cm   Mitral:                                 Aortic   Valve Area (P1/2-Time): 3.86 cm^2       Peak Velocity: 1.66 m/s  Peak E-Wave: 0.75 m/s                   Mean Velocity: 1.04 m/s  Peak A-Wave: 0.60 m/s                   Peak Gradient: 11.02 mmHg  E/A Ratio: 1.25                         Mean Gradient: 5 mmHg  Peak Gradient: 2.25 mmHg  Mean Gradient: 2 mmHg  Deceleration Time: 195 msec             Area (continuity): 1.37 cm^2  P1/2t: 57 msec                          AV VTI: 29.9 cm   Area (continuity): 1.96 cm^2  Mean Velocity: 0.75 m/s   Tricuspid:                              Pulmonic:   Peak TR Velocity: 4.34 m/s              Peak Velocity: 1.22 m/s  Peak TR Gradient: 75.3424 mmHg          Peak Gradient: 5.95 mmHg  Diastology / Tissue Doppler Septal Wall E' velocity:0.10 m/s Septal Wall E/E':7.7 Lateral Wall E' velocity:0.11 m/s Lateral Wall E/E':6.6    XR ABDOMEN (KUB) (SINGLE AP VIEW)    Result Date: 2022  EXAMINATION: ONE SUPINE XRAY VIEW(S) OF THE ABDOMEN 2022 12:45 pm COMPARISON: CT dated 2019. HISTORY: ORDERING SYSTEM PROVIDED HISTORY: Constipation, right lower abdominal  pain TECHNOLOGIST PROVIDED HISTORY: Constipation, right lower abdominal  pain Reason for Exam: supine FINDINGS: Nonspecific bowel gas pattern is seen with gaseous distension of the stomach. Mild-to-moderate amount of stool is noted in the colon. Evaluation of free air is limited on this supine view. There appears to be Trujillo catheter in place. Nonspecific bowel gas pattern with mild-to-moderate amount of stool noted in the colon. Gaseous distention of the stomach.      XR CHEST PORTABLE    Result Date: 4/10/2022  EXAMINATION: ONE XRAY VIEW OF THE CHEST 4/10/2022 9:12 am COMPARISON: 2022 HISTORY: Reason for Exam: Acute hypoxia admitted with Pul edema FINDINGS: Stable cardiomegaly. Mild central vascular congestion. Interval increased right perihilar/infrahilar opacity. No sizable effusion or discernible pneumothorax. Osseous structures grossly intact. *Stable cardiomegaly with mild central vascular congestion. *Interval increased right perihilar/infrahilar opacity which may represent developing pneumonia. XR CHEST PORTABLE    Result Date: 4/8/2022  EXAMINATION: ONE XRAY VIEW OF THE CHEST 4/8/2022 4:37 pm COMPARISON: 03/22/2022 HISTORY: ORDERING SYSTEM PROVIDED HISTORY: Shortness of breath, COPD and CHF, recent admission for pneumonia TECHNOLOGIST PROVIDED HISTORY: Shortness of breath, COPD and CHF, recent admission for pneumonia Reason for Exam: upr,sob, FINDINGS: Examination is limited by the patient's body habitus and by portable technique. Cardial pericardial silhouette is prominent but stable. There are low lung volumes is secondary bronchovascular crowding. No focal infiltrate is identified. No pneumothorax. No free air. No acute bony abnormality. Limited negative portable chest radiograph. XR CHEST PORTABLE    Result Date: 3/22/2022  EXAMINATION: ONE XRAY VIEW OF THE CHEST 3/22/2022 9:30 am COMPARISON: 03/19/2022 HISTORY: ORDERING SYSTEM PROVIDED HISTORY: improvement in pnuemonia TECHNOLOGIST PROVIDED HISTORY: improvement in pnuemonia Reason for Exam: ap uprt port chest FINDINGS: As compared to prior examination, there has been significant improvement in the right lower lobe infiltrate. Lungs appear clear. There is cardiomegaly noted. Bony structures unremarkable. Improvement in the the right basal infiltrate. XR CHEST PORTABLE    Result Date: 3/19/2022  EXAMINATION: ONE XRAY VIEW OF THE CHEST 3/19/2022 12:51 am COMPARISON: CT PA performed approximately 10 hours earlier. Portable chest obtained approximately 12 hours earlier.  HISTORY: ORDERING SYSTEM PROVIDED HISTORY: Increasing O2 requirment TECHNOLOGIST PROVIDED HISTORY: Increasing O2 requirment Reason for Exam: shortness of breath, chest pain off and on   upright port FINDINGS: Mild cardiomegaly and normal pulmonary vasculature. Right worse than left bibasilar patchy airspace opacities which appear worse than exam 12 hours earlier. No pneumothorax or pleural effusion. Surrounding structures are unremarkable. Findings suggestive of worsening bibasilar pneumonia. XR CHEST PORTABLE    Result Date: 3/18/2022  EXAMINATION: ONE XRAY VIEW OF THE CHEST 3/18/2022 11:16 am COMPARISON: Chest x-ray dated 29 June 2021 HISTORY: ORDERING SYSTEM PROVIDED HISTORY: sob TECHNOLOGIST PROVIDED HISTORY: sob FINDINGS: Mild stable cardiomegaly with pulmonary vascular congestion. No pneumothorax or pleural effusion. Stable cardiomegaly with mild pulmonary vascular congestion. CT CHEST PULMONARY EMBOLISM W CONTRAST    Result Date: 3/18/2022  EXAMINATION: CTA OF THE CHEST 3/18/2022 1:23 pm TECHNIQUE: CTA of the chest was performed after the administration of intravenous contrast.  Multiplanar reformatted images are provided for review. MIP images are provided for review. Dose modulation, iterative reconstruction, and/or weight based adjustment of the mA/kV was utilized to reduce the radiation dose to as low as reasonably achievable. COMPARISON: None HISTORY: ORDERING SYSTEM PROVIDED HISTORY: sedentary lifestyle, leg swelling, increased O2 TECHNOLOGIST PROVIDED HISTORY: sedentary lifestyle, leg swelling, increased O2 Decision Support Exception - unselect if not a suspected or confirmed emergency medical condition->Emergency Medical Condition (MA) Reason for Exam: sedentary lifestyle, leg swelling, increased O2 FINDINGS: Pulmonary Arteries: Pulmonary arteries are adequately opacified for evaluation. No evidence of intraluminal filling defect to suggest pulmonary embolism. Main pulmonary artery is normal in caliber.  Mediastinum: No evidence of mediastinal lymphadenopathy. Small reactive lymph nodes seen in the mediastinum and hilar regions. The heart and pericardium demonstrate no acute abnormality. There is no acute abnormality of the thoracic aorta. Lungs/pleura: Patchy ground-glass opacity and increased markings seen in the lower lung fields bilaterally concerning for bibasilar infiltrate. No pleural effusion or pneumothorax. No pulmonary mass or nodule. Patchy ground-glass opacity in the upper lung fields bilaterally. Upper Abdomen: Limited images of the upper abdomen are unremarkable. Soft Tissues/Bones: No acute bone or soft tissue abnormality. Degenerative changes seen within the spine with no chest wall abnormality. No evidence of pulmonary embolism. There is patchy ill-defined opacification lower lung fields bilaterally suggesting infiltrate with ground-glass opacity concerning for pneumonia as well in the upper lung fields. Reactive adenopathy throughout the mediastinum and hilar regions. RECOMMENDATIONS: Unavailable     VL DUP LOWER EXTREMITY VENOUS BILATERAL    Result Date: 3/19/2022    [facility]  Vascular Lower Extremities DVT Study Procedure   Patient Name  CARMEN       Date of Study           03/18/2022                [address] [facility]   Date of Birth 1975  Gender                  Female   Age           55 year(s)  Race                    Black   Room Number   2008        Height:                 65.98 inch, 167.6 cm   Corporate ID  [id number]    Weight:                 380 pounds, 172.4 kg  #   Patient Acct  [de-identified]   BSA:        2.63 m^2    BMI:       61.36 kg/m^2  #   MR #          [id number]     Sonographer             Yancy Nur   Accession #   [id number]  Interpreting Physician  Vitor Guillaume   Referring                 Referring Physician     Ermelinda Restrepo.  Shanika Trujillo DO  Nurse  Practitioner  Procedure Type of Study:   Veins: Lower Extremities DVT Study, Venous Scan Lower Bilateral.  Indications for Study:Leg Swelling and Shortness of breath. Patient Status:ER. Technical Quality:Limited visualization. Limitation reason:bedside exam , body habitus, deep vessels, edema. Conclusions   Summary   No evidence of superficial or deep venous thrombosis in both lower  extremities. Signature   ----------------------------------------------------------------  Electronically signed by NIKKI Vail(Sonographer) on  03/18/2022 01:27 PM  ----------------------------------------------------------------   ----------------------------------------------------------------  Electronically signed by Carlen Craze Reyes,Arthur(Interpreting  physician) on 03/19/2022 07:28 AM  ----------------------------------------------------------------  Findings:   Right Impression:                    Left Impression:  The common femoral, femoral,         The common femoral, femoral,  popliteal and tibial veins           popliteal and tibial veins  demonstrate normal compressibility   demonstrate normal compressibility  and augmentation. and augmentation. Normal compressibility of the great  Normal compressibility of the great  saphenous vein. saphenous vein. Normal compressibility of the small  Normal compressibility of the small  saphenous vein. saphenous vein. Limited visualization of the         Limited visualization of the  posterior tibial and peroneal veins. posterior tibial and peroneal veins. Risk Factors History +-------------------------------------------+----------+-------------------+ ! Diagnosis                                  ! Date      ! Comments           ! +-------------------------------------------+----------+-------------------+ ! Previous Scan                              !04/04/2008! WNL                ! +-------------------------------------------+----------+-------------------+ ! Chronic lung disease->COPD                 !          !                   ! +-------------------------------------------+----------+-------------------+ ! Previous Scan                              !09/22/2014! Bilateral WNL      ! +-------------------------------------------+----------+-------------------+ ! CHF                                        ! !                   ! +-------------------------------------------+----------+-------------------+   - The patient's risk factor(s) include: chronic lung disease, dyslipidemia     and arterial hypertension.   - The patient has a former tobacco history. Allergies   - Allergy:Aspirin(Drug). - Allergy:Sulfa(Drug). Velocities are measured in cm/s ; Diameters are measured in cm Right Lower Extremities DVT Study Measurements Right 2D Measurements +------------------------------------+----------+---------------+----------+ ! Location                            ! Visualized! Compressibility! Thrombosis! +------------------------------------+----------+---------------+----------+ ! Common Femoral                      !Yes       ! Yes            ! None      ! +------------------------------------+----------+---------------+----------+ ! Prox Femoral                        !Yes       ! Yes            ! None      ! +------------------------------------+----------+---------------+----------+ ! Mid Femoral                         !Partial   !Yes            ! None      ! +------------------------------------+----------+---------------+----------+ ! Dist Femoral                        !Partial   !Yes            ! None      ! +------------------------------------+----------+---------------+----------+ ! Popliteal                           !Yes       ! Yes            ! None      ! +------------------------------------+----------+---------------+----------+ ! Sapheno Femoral Junction            ! Yes       ! Yes            ! None      ! +------------------------------------+----------+---------------+----------+ ! PTV                                 ! Partial   !Yes !None      ! +------------------------------------+----------+---------------+----------+ ! Peroneal                            !Partial   !Yes            ! None      ! +------------------------------------+----------+---------------+----------+ ! Gastroc                             ! Yes       ! Yes            ! None      ! +------------------------------------+----------+---------------+----------+ ! GSV Thigh                           ! Yes       ! Yes            ! None      ! +------------------------------------+----------+---------------+----------+ ! GSV Knee                            ! Yes       ! Yes            ! None      ! +------------------------------------+----------+---------------+----------+ ! GSV Ankle                           ! Yes       ! Yes            ! None      ! +------------------------------------+----------+---------------+----------+ ! SSV                                 ! Yes       ! Yes            ! None      ! +------------------------------------+----------+---------------+----------+ Right Doppler Measurements +--------------------------+---------+------+------------------------------+ ! Location                  ! Signal   !Reflux! Reflux (msec)                 ! +--------------------------+---------+------+------------------------------+ ! Common Femoral            !Pulsatile!      !                              ! +--------------------------+---------+------+------------------------------+ ! Prox Femoral              !Pulsatile!      !                              ! +--------------------------+---------+------+------------------------------+ ! Popliteal                 !Pulsatile!      !                              ! +--------------------------+---------+------+------------------------------+ Left Lower Extremities DVT Study Measurements Left 2D Measurements +------------------------------------+----------+---------------+----------+ ! Location !Visualized! Compressibility! Thrombosis! +------------------------------------+----------+---------------+----------+ ! Common Femoral                      !Yes       ! Yes            ! None      ! +------------------------------------+----------+---------------+----------+ ! Prox Femoral                        !Yes       ! Yes            ! None      ! +------------------------------------+----------+---------------+----------+ ! Mid Femoral                         !Partial   !Yes            ! None      ! +------------------------------------+----------+---------------+----------+ ! Dist Femoral                        !Partial   !Yes            ! None      ! +------------------------------------+----------+---------------+----------+ ! Popliteal                           !Yes       ! Yes            ! None      ! +------------------------------------+----------+---------------+----------+ ! Sapheno Femoral Junction            ! Yes       ! Yes            ! None      ! +------------------------------------+----------+---------------+----------+ ! PTV                                 ! Partial   !Yes            ! None      ! +------------------------------------+----------+---------------+----------+ ! Peroneal                            !Partial   !Yes            ! None      ! +------------------------------------+----------+---------------+----------+ ! Gastroc                             ! Yes       ! Yes            ! None      ! +------------------------------------+----------+---------------+----------+ ! GSV Thigh                           ! Yes       ! Yes            ! None      ! +------------------------------------+----------+---------------+----------+ ! GSV Knee                            ! Yes       ! Yes            ! None      ! +------------------------------------+----------+---------------+----------+ ! GSV Ankle                           ! Yes       ! Yes            ! None      ! +------------------------------------+----------+---------------+----------+ ! SSV                                 ! Yes       ! Yes            ! None      ! +------------------------------------+----------+---------------+----------+ Left Doppler Measurements +--------------------------+---------+------+------------------------------+ ! Location                  ! Signal   !Reflux! Reflux (msec)                 ! +--------------------------+---------+------+------------------------------+ ! Common Femoral            !Pulsatile!      !                              ! +--------------------------+---------+------+------------------------------+ ! Prox Femoral              !Pulsatile!      !                              ! +--------------------------+---------+------+------------------------------+ ! Popliteal                 !Pulsatile!      !                              ! +--------------------------+---------+------+------------------------------+    FL MODIFIED BARIUM SWALLOW W VIDEO    Result Date: 3/19/2022  EXAMINATION: MODIFIED BARIUM SWALLOW WAS PERFORMED IN CONJUNCTION WITH SPEECH PATHOLOGY SERVICES TECHNIQUE: Fluoroscopic evaluation of the swallowing mechanism was performed using cineradiography with multiple consistency of barium product in conjunction with speech pathology services. FLUOROSCOPY DOSE AND TYPE OR TIME AND EXPOSURES: Fluoro time: 1.1 minute DAP: 22.570 mGy COMPARISON: None HISTORY: ORDERING SYSTEM PROVIDED HISTORY: h/o esophageal stricture per patient TECHNOLOGIST PROVIDED HISTORY: h/o esophageal stricture per patient 70-year-old female with history of esophageal stricture FINDINGS: No penetration or aspiration with the thin liquid and thick liquid substance by straw, pureed/pudding thick, soft solid and cookie solid substances. No penetration or aspiration with the above administered substances. Please see separate speech pathology report for full discussion of findings and recommendations. ASSESSMENT & PLAN     ASSESSMENT / PLAN:     Active Problems:    Diarrhea    Asthma    HTN (hypertension)    Acute respiratory failure with hypoxia and hypercapnia (HCC)    Morbid obesity ([facility] [address]    Pulmonary hypertension, moderate to severe (HCC)    Morbid obesity with BMI of 50.0-59.9, adult (HCC)    Obesity hypoventilation syndrome (HCC)    STEPH (obstructive sleep apnea)    Chronic respiratory failure (HCC)    Right heart failure, unspecified (HCC)    Normocytic anemia    Hyperlipidemia    Cor pulmonale, chronic (HCC)  Resolved Problems:    * No resolved hospital problems. *      Acute on chronic hypoxic hypercapnic respiratory failure likely secondary to severe pulmonary hypertension, acute on chronic cor pulmonale with history of STEPH/OHS/morbid obesity, asthma/COPD, prior history of tracheostomy  -Continues to be on diuretics 40 IV twice daily, bronchodilators, pulmonology following  -Continues to be on high flow nasal cannula 50-->60% FiO2 25 L, alternating with BiPAP  -Long-term plan to have trilogy ventilator, pending insurance/physician review approval.  -Patient rejected at [facility]. Will likely go to SNF. Concern for pneumonia-completed 7-day course of Levaquin this admission on 4/17    Essential HTN continue amlodipine. Discontinue Hydralazine as BP running soft. Anemia of chronic disease continue iron, folic acid, K54 supplementation    Diabetes mellitus type 2 - continue Lantus 20 nightly with sliding scale. Hypokalemia potassium low at 3.0. Replace and follow BMP. Diarrhea - Imodium given. Gastrointestinal panel and C diff toxin/antigen sent. Follow up results. DVT ppx: heparin  GI ppx: protonix   Diet: Regular    Patient has been seen by palliative care on 4/12/2022. Patient and family want everything to be done to the patient. CODE STATUS -full code. PT/OT/SW : On board. Discharge Planning:  Discharge to [facility].  assisting with transitional planning. Called  [phone] for peer to peer. Patient denied in LTAC as she is being actively treated for heart failure. Second appeal at [facility] being done by KAYLAH Baltazar MD  Internal Medicine Resident, PGY-1  [facility]; [place], [place]  4/21/2022, 2:49 PM    Attestation and add on       I have discussed the care of Lori Barrow , including pertinent history and exam findings,      4/21/22    with the resident. I have seen and examined the patient and the key elements of all parts of the encounter have been performed by me . I agree with the assessment, plan and orders as documented by the resident. Active Problems:    Diarrhea    Asthma    HTN (hypertension)    Acute respiratory failure with hypoxia and hypercapnia (HCC)    Morbid obesity ([facility] [address]    Pulmonary hypertension, moderate to severe (HCC)    Morbid obesity with BMI of 50.0-59.9, adult (HCC)    Obesity hypoventilation syndrome (HCC)    STEPH (obstructive sleep apnea)    Chronic respiratory failure (HCC)    Right heart failure, unspecified (HCC)    Normocytic anemia    Hyperlipidemia    Cor pulmonale, chronic (HCC)  Resolved Problems:    * No resolved hospital problems. *        ''''''''''       MD KENNETH Joyce [address], [address] [place] [address].    Phone [phone]   Fax: [phone]  Answering Service: [phone]

## 2022-04-21 NOTE — PLAN OF CARE
Problem: Falls - Risk of:  Goal: Will remain free from falls  Description: Will remain free from falls  Outcome: Progressing  Goal: Absence of physical injury  Description: Absence of physical injury  Outcome: Progressing     Problem: Skin Integrity:  Goal: Will show no infection signs and symptoms  Description: Will show no infection signs and symptoms  Outcome: Progressing  Goal: Absence of new skin breakdown  Description: Absence of new skin breakdown  Outcome: Progressing     Problem: Nutrition  Goal: Optimal nutrition therapy  Outcome: Progressing     Problem: Pain:  Goal: Pain level will decrease  Description: Pain level will decrease  Outcome: Progressing  Goal: Control of acute pain  Description: Control of acute pain  Outcome: Progressing  Goal: Control of chronic pain  Description: Control of chronic pain  Outcome: Progressing     Problem: Discharge Planning:  Goal: Discharged to appropriate level of care  Description: Discharged to appropriate level of care  Outcome: Progressing     Problem:  Activity:  Goal: Capacity to carry out activities will improve  Description: Capacity to carry out activities will improve  Outcome: Progressing  Goal: Will verbalize the importance of balancing activity with adequate rest periods  Description: Will verbalize the importance of balancing activity with adequate rest periods  Outcome: Progressing     Problem: Cardiac:  Goal: Hemodynamic stability will improve  Description: Hemodynamic stability will improve  Outcome: Progressing  Goal: Ability to maintain an adequate cardiac output will improve  Description: Ability to maintain an adequate cardiac output will improve  Outcome: Progressing     Problem: Coping:  Goal: Verbalizations of decreased anxiety will decrease  Description: Verbalizations of decreased anxiety will decrease  Outcome: Progressing     Problem: Fluid Volume:  Goal: Risk for excess fluid volume will decrease  Description: Risk for excess fluid volume will decrease  Outcome: Progressing  Goal: Maintenance of adequate hydration will improve  Description: Maintenance of adequate hydration will improve  Outcome: Progressing  Goal: Will show no signs and symptoms of electrolyte imbalance  Description: Will show no signs and symptoms of electrolyte imbalance  Outcome: Progressing     Problem: Health Behavior:  Goal: Ability to manage health-related needs will improve  Description: Ability to manage health-related needs will improve  Outcome: Progressing  Goal: Ability to seek appropriate health care will improve  Description: Ability to seek appropriate health care will improve  Outcome: Progressing     Problem: Nutritional:  Goal: Maintenance of adequate nutrition will improve  Description: Maintenance of adequate nutrition will improve  Outcome: Progressing     Problem: Physical Regulation:  Goal: Complications related to the disease process, condition or treatment will be avoided or minimized  Description: Complications related to the disease process, condition or treatment will be avoided or minimized  Outcome: Progressing     Problem: Respiratory:  Goal: Ability to maintain adequate ventilation will improve  Description: Ability to maintain adequate ventilation will improve  Outcome: Progressing  Goal: Respiratory status will improve  Description: Respiratory status will improve  Outcome: Progressing     Problem: Discharge Planning  Goal: Discharge to home or other facility with appropriate resources  Outcome: Progressing     Problem: Chronic Conditions and Co-morbidities  Goal: Patient's chronic conditions and co-morbidity symptoms are monitored and maintained or improved  Outcome: Progressing

## 2022-04-22 NOTE — PROGRESS NOTES
PULMONARY & CRITICAL CARE MEDICINE PROGRESS NOTE     Patient:  Jenny Gomes  MRN: [id number]  Admit date: 2022  Primary Care Physician: Telma Saldana DO  Consulting Physician: Rachana Liz MD  CODE Status: Full Code  LOS: 14     SUBJECTIVE     CHIEF COMPLAINT/REASON FOR INITIAL CONSULT: Acute on chronic respiratory failure    BRIEF HOSPITAL COURSE:  The patient is a 55 y.o. 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 Patient was discharged home recently on supplemental oxygen and insurance had not approved trilogy so she was sent home with her home CPAP. Patient was brought back to the hospital because she was having worsening shortness of breath. Unable to use her CPAP because of fatigue. On arrival EMS found that her saturation was in mid [de-identified]. She was placed on nonrebreather brought to the ER. Pulmonary has been consulted because of high flow requirement and hypoxia. Patient is laying in bed, she is on high flow oxygen 80%, using BiPAP at night. Her BNP was elevated. She is under going diuresis,   On Symbicort, Spiriva and albuterol. INTERVAL HISTORY:  22  Patient remains on high flow nasal cannula O2 Flow Rate (L/min)  Av L/min  Min: 25 L/min  Max: 25 L/min, FiO2 60%  She was on BiPAP /10 overnight  No change in overall clinical condition  Awaits placement    REVIEW OF SYSTEMS:  Review of Systems   Constitutional: Positive for fatigue. Negative for fever. HENT: Negative for voice change. Eyes: Negative for discharge and visual disturbance. Respiratory: Positive for shortness of breath. Gastrointestinal: Negative for abdominal pain, diarrhea and vomiting. Genitourinary: Negative for dysuria and urgency. Musculoskeletal: Negative for joint swelling. Allergic/Immunologic: Negative for environmental allergies and immunocompromised state. Neurological: Positive for weakness. Hematological: Negative for adenopathy. Does not bruise/bleed easily. Psychiatric/Behavioral: Negative for behavioral problems. OBJECTIVE     PaO2/FiO2 RATIO:  No results for input(s): POCPO2 in the last 72 hours. FiO2 : (S) 60 %     VITAL SIGNS:   LAST:  BP 95/65   Pulse 127 Comment: at max with exertion  Temp 98.2 °F (36.8 °C) (Oral)   Resp 29   Ht 5' 6\" (1.676 m)   Wt (!) 334 lb 3.5 oz (151.6 kg)   SpO2 90%   BMI 53.94 kg/m²   8-24 HR RANGE:  TEMP Temp  Av.3 °F (36.8 °C)  Min: 98.2 °F (36.8 °C)  Max: 98.5 °F (67.7 °C)   BP Systolic (90MZS), TUX:258 , Min:93 , CFD:326      Diastolic (64NBQ), HWX:48, Min:57, Max:67     PULSE Pulse  Av.6  Min: 75  Max: 127   RR Resp  Av  Min: 16  Max: 35   O2 SAT SpO2  Av.8 %  Min: 86 %  Max: 100 %   OXYGEN DELIVERY O2 Flow Rate (L/min)  Av L/min  Min: 25 L/min  Max: 25 L/min         Physical Exam  Constitutional:       General: She is awake. Appearance: She is morbidly obese. She is ill-appearing. HENT:      Head: Normocephalic and atraumatic. Eyes:      General: No scleral icterus. Conjunctiva/sclera: Conjunctivae normal.      Pupils: Pupils are equal, round, and reactive to light. Cardiovascular:      Rate and Rhythm: Normal rate and regular rhythm. Heart sounds: No murmur heard. Pulmonary:      Effort: Prolonged expiration present. No accessory muscle usage or respiratory distress. Breath sounds: Decreased air movement present. Abdominal:      General: Bowel sounds are normal.      Palpations: Abdomen is soft. Tenderness: There is no abdominal tenderness. Musculoskeletal:      Cervical back: Neck supple. Neurological:      General: No focal deficit present. Mental Status: She is alert.        DATA REVIEW     Medications: Current Inpatient  Scheduled Meds:   potassium chloride  20 mEq Oral BID WC    lactobacillus  1 capsule Oral Daily with breakfast    furosemide  40 mg IntraVENous BID    lidocaine  1 patch TransDERmal Daily    insulin lispro  0-18 Units SubCUTAneous TID     insulin lispro  0-9 Units SubCUTAneous Nightly    sertraline  100 mg Oral Daily    albuterol  2.5 mg Nebulization 4x daily    amLODIPine  5 mg Oral Daily    atorvastatin  40 mg Oral Nightly    budesonide-formoterol  2 puff Inhalation BID    ferrous sulfate  325 mg Oral BID WC    folic acid  1 mg Oral Daily    isosorbide dinitrate  20 mg Oral TID    pantoprazole  40 mg Oral QAM AC    spironolactone  25 mg Oral Daily    tiotropium  2 puff Inhalation Daily    vitamin B-12  1,000 mcg Oral Daily    sodium chloride flush  10 mL IntraVENous 2 times per day    heparin (porcine)  5,000 Units SubCUTAneous 3 times per day    gabapentin  300 mg Oral TID     Continuous Infusions:   sodium chloride Stopped (04/09/22 0636)    dextrose         INPUT/OUTPUT:  In: 850 [P.O.:840; I.V.:10]  Out: 800 [Urine:800]  Date 04/22/22 0000 - 04/22/22 2359   Shift 9210-4388 9546-3079 3007-3128 24 Hour Total   INTAKE   I.V.(mL/kg)  10(0.1)  10(0.1)   Shift Total(mL/kg)  10(0.1)  10(0.1)   OUTPUT   Shift Total(mL/kg)       Weight (kg) 151.6 151.6 151.6 151.6        LABS:  ABGs:   No results for input(s): POCPH, POCPCO2, POCPO2, POCHCO3, SHOW5SUG in the last 72 hours. CBC:   No results for input(s): WBC, HGB, HCT, MCV, PLT, LABLYMP, MID, GRAN, LYMPHOPCT, MIDPERCENT, GRANULOCYTES, RBC, MCH, MCHC, RDW in the last 72 hours. Invalid input(s): RELATIVEPERCENT  CRP:   No results for input(s): CRP in the last 72 hours. LDH:   No results for input(s): LDH in the last 72 hours. BMP:   Recent Labs     04/20/22  0542 04/21/22  0223 04/22/22  0540    136 134*   K 3.0* 3.4* 3.3*   CL 90* 93* 93*   CO2 32* 34* 29   BUN 19 17 16   CREATININE 1.29* 1.18* 1.02*   GLUCOSE 94 105* 116*     Liver Function Test:   No results for input(s): PROT, LABALBU, ALT, AST, GGT, ALKPHOS, BILITOT in the last 72 hours.   Coagulation Profile:   No results for input(s): INR, PROTIME, APTT in the last 72 hours. D-Dimer:  No results for input(s): DDIMER in the last 72 hours. Lactic Acid:  No results for input(s): LACTA in the last 72 hours. Cardiac Enzymes:  No results for input(s): CKTOTAL, CKMB, CKMBINDEX, TROPONINI in the last 72 hours. Invalid input(s): TROPONIN, HSTROP  BNP/ProBNP:   No results for input(s): BNP, PROBNP in the last 72 hours. Triglycerides:  No results for input(s): TRIG in the last 72 hours. Microbiology:  Urine Culture:  No components found for: CURINE  Blood Culture:  No components found for: CBLOOD, CFUNGUSBL  Sputum Culture:  No components found for: CSPUTUM  No results for input(s): SPECDESC, SPECIAL, CULTURE, STATUS, ORG, CDIFFTOXPCR, CAMPYLOBPCR, SALMONELLAPC, SHIGAPCR, SHIGELLAPCR, MPNEUG, MPNEUM, LACTOQL in the last 72 hours. No results for input(s): SPUTUM, SPECDESC, SPECIAL, CULTURE, STATUS, ORG, CDIFFTOXPCR, MPNEUM, MPNEUG in the last 72 hours. Invalid input(s): Pascual Daniela, CFUNGUSBL     Pathology:    Radiology Reports:  XR CHEST PORTABLE   Final Result   *Stable cardiomegaly with mild central vascular congestion. *Interval increased right perihilar/infrahilar opacity which may represent   developing pneumonia. XR CHEST PORTABLE   Final Result   Limited negative portable chest radiograph. Echocardiogram:   Results for orders placed during the hospital encounter of 03/18/22    ECHO Complete 2D W Doppler W Color    Narrative    Summary  Left ventricle is normal in size. Global left ventricular systolic function is normal. Calculated ejection  fraction 60% by Heart Model. Marked Leftward compression of inter-ventricular septum (\"D-sign\")  indicating RV volume or pressure overload. Right ventricular function appears reduced. Moderately dilated right  ventricular cavity. Moderate to severe tricuspid regurgitation. Severe pulmonary hypertension.  Estimated right ventricular systolic pressure  is 80mmHg. Trivial pulmonic insufficiency. ASSESSMENT AND PLAN     Assessment:    // Acute on chronic hypoxic/hypercapnic respiratory failure  // Chronic obstructive pulmonary disease  // Diastolic CHF  // Severe pulmonary hypertension  // Chronic cor pulmonale  // Morbid obesity  // Obstructive sleep apnea  // Obesity hypoventilation syndrome    Plan:    I personally interviewed/examined the patient; reviewed interval history, interpreted all available radiographic and laboratory data at the time of service.  Patient remains on high flow nasal cannula, FiO2 60%, flow rate 25 L/min   Wean FiO2/flow rate as tolerated   Continue supplemental oxygen to keep oxygen saturation >90%   Continue nocturnal and as needed BiPAP   Encourage incentive spirometry/Acapella   Continue pulmonary toilet, aspiration precautions and bronchodilators   Monitor I/O, electrolytes with a goal of even/negative fluid balance   Tolerating oral diet   Stress ulcer prophylaxis   Chemical DVT prophylaxis; heparin   Antimicrobials reviewed; completed 7-day course of Levaquin on 4/17   Completed prednisone taper   Physical/occupational therapy   Overall prognosis guarded   Awaits placement to LTAC    I would like to thank you for allowing me to participate in the care of this patient. Please feel free to call with any further questions or concerns. Matias Mancia MD  Pulmonary and Critical Care Medicine           4/22/2022, 3:30 PM    Please note that this chart was generated using voice recognition Dragon dictation software. Although every effort was made to ensure the accuracy of this automated transcription, some errors in transcription may have occurred.

## 2022-04-22 NOTE — CARE COORDINATION
Spoke to Alysha at [facility]. Appeal is still pending    [phone] insurance called bedside RN, Theo Mohr, and notified that they are denying second appeal. Met with pt to notify. She would like to pursue SNF. Freedom of choice provided. [facility] first choice, [facility] second choice. Referrals resent.  Voicemail left for admissions at [facility] to notify

## 2022-04-22 NOTE — PROGRESS NOTES
Attestation and add on       I have discussed the care of Shannan Liu , including pertinent history and exam findings,      4/22/22    with the resident. I have seen and examined the patient and the key elements of all parts of the encounter have been performed by me . I agree with the assessment, plan and orders as documented by the resident. Active Problems:    Diarrhea    Asthma    HTN (hypertension)    Acute respiratory failure with hypoxia and hypercapnia (HCC)    Morbid obesity ([address]    Pulmonary hypertension, moderate to severe (HCC)    Morbid obesity with BMI of 50.0-59.9, adult (HCC)    Obesity hypoventilation syndrome (HCC)    STEPH (obstructive sleep apnea)    Chronic respiratory failure (HCC)    Right heart failure, unspecified (HCC)    Normocytic anemia    Hyperlipidemia    Cor pulmonale, chronic (HCC)  Resolved Problems:    * No resolved hospital problems. *        ''''''''''       MD KENNETH Porras [address]. Phone [phone]   Fax: [phone]  Answering Service: [phone]              [facility]  Internal Medicine Teaching Residency Program  Inpatient Daily Progress Note  ______________________________________________________________________________    Patient: Shannan Liu  YOB: 1975   SALOME:[id number]    Acct: [de-identified]     Room: 2004/2004-01  Admit date: 4/8/2022  Today's date: 04/22/22  Number of days in the hospital: 14    SUBJECTIVE   Admitting Diagnosis: <principal problem not specified>  CC: SOB  Pt examined at bedside. Chart & results reviewed. No acute events overnight. Patient is afebrile and is hemodynamically stable. Blood pressure on the soft side. No more abdominal pain or vomiting  Continued on HFNC 50% FiO2 25 L, alternating with BIPAP  On diuresis with Lasix 40 Mg IV twice daily . Awaiting SNF/LTAC placement.     ROS:  Constitutional: negative for chills, fevers, sweats  Respiratory:  negative for cough, wheezing, hemoptysis  Cardiovascular: lower extremity edema, palpitations  Gastrointestinal:, negative for abdominal pain, constipation,  nausea, vomiting  Neurological:  negative for dizziness, headache    BRIEF HISTORY     The patient is a pleasant 55 y.o. 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 Patient states she has had increased shortness of breath the past 2 nights, unable to wear her CPAP nightly with increased fatigue. Patient is poor historian, states she wears 7 L O2 at home, however EMS on arrival saw patient on 4 L and was saturating in the mid 80s. Patient was placed on nonrebreather by EMS and saturations improved to 98%.     Of note, patient had previous hospitalization earlier this month for COPD exacerbation complicated by pneumonia. At that time patient was treated with antibiotics and steroids. Pulmonology was consulted and patient was approved for trilogy noninvasive ventilator which she has yet to receive. Echocardiogram at that time showed EF 60%, \"D\" sign indicating RV pressure overload, moderate to severe MR, severe pulmonary hypertension. Patient is aware of these findings.     On my evaluation, patient resting comfortably on 15 L nonrebreather mask acute distress. Patient is morbidly obese with BMI 57. Denies any cough, chest pain, leg pain/swelling. She does have skin irritation on her buttocks. States she has been compliant with all meds (inculding bronchodilators and bumex 2mg BID) except for her PO DM meds as those were held on previous admission. Afebrile, hemodynamic stable, tachycardic in the 110s at present. Patient received prednisone and lasix 40mg IV in ED as well as rocephin and vancomycin.  CXR in ED - limited negative CXR d/t body habitus.     Significant labs include proBNP on admission 4192, was in 1000s on recent discharge. Leukocytosis of 17.0. Hemoglobin 9.7. Slight bump in creatinine from baseline- 1.21. glucose 238.       4/10/2022-episode of hypoxia, patient drank 3.5 L of fluid admitted with CHF exacerbation, getting a chest x-ray, IV diuretics to continue, labs de-escalate, prednisone discontinued. 2022 - Patient has drank 2.5 L in the last 24 hours. Admitted with CHF exacerbation, currently on IV Lasix 80 mg IV twice daily. Did receive this morning. We will get a chest x-ray to look for any worsening pulmonary edema. Mild edema bilateral lower extremities noted. Labs Descalated, prednisone discontinued    2022 - Overnight BG went up to 423 and then insulin Lantus was increased to 20 nightly. 2022 -Lasix changed from 80 mg to 40 mg IV twice daily       OBJECTIVE     Vital Signs:  /67   Pulse 75   Temp 98.2 °F (36.8 °C) (Axillary)   Resp 16   Ht 5' 6\" (1.676 m)   Wt (!) 334 lb 3.5 oz (151.6 kg)   SpO2 100%   BMI 53.94 kg/m²     Temp (24hrs), Av.5 °F (36.9 °C), Min:98.2 °F (36.8 °C), Max:98.7 °F (37.1 °C)    In: 850   Out: 800 [Urine:800]    Physical Exam:  Constitutional: This is a well developed, well nourished, Greater than 40 - Morbid Obesity / Extreme Obesity / Grade III 55y.o. year old female who is alert, oriented, cooperative and in no apparent distress. Head:normocephalic and atraumatic. On high flow nasal cannula  EENT:  PERRLA. No conjunctival injections. Septum was midline, mucosa was without erythema, exudates or cobblestoning. No thrush was noted. Neck: Supple without thyromegaly. No elevated JVP. Trachea was midline. Respiratory: Breath sounds clear to auscultation bilaterally. No crackles, wheezes or rales. Cardiovascular: HR regular without murmur, clicks, gallops or rubs. Abdomen: Abdomen soft bowel sounds present. No organomegaly. Lymphatic: No lymphadenopathy. Musculoskeletal: Normal curvature of the spine. No gross muscle weakness. Extremities: Bilateral lower extremity edema improved. Skin:  Warm and dry. Good color, turgor and pigmentation. No lesions or scars.   No cyanosis or clubbing  Neurological/Psychiatric: The patient's general behavior, level of consciousness, thought content and emotional status is normal.        Medications:  Scheduled Medications:    potassium chloride  20 mEq Oral BID WC    lactobacillus  1 capsule Oral Daily with breakfast    furosemide  40 mg IntraVENous BID    lidocaine  1 patch TransDERmal Daily    insulin lispro  0-18 Units SubCUTAneous TID WC    insulin lispro  0-9 Units SubCUTAneous Nightly    sertraline  100 mg Oral Daily    albuterol  2.5 mg Nebulization 4x daily    amLODIPine  5 mg Oral Daily    atorvastatin  40 mg Oral Nightly    budesonide-formoterol  2 puff Inhalation BID    ferrous sulfate  325 mg Oral BID WC    folic acid  1 mg Oral Daily    isosorbide dinitrate  20 mg Oral TID    pantoprazole  40 mg Oral QAM AC    spironolactone  25 mg Oral Daily    tiotropium  2 puff Inhalation Daily    vitamin B-12  1,000 mcg Oral Daily    sodium chloride flush  10 mL IntraVENous 2 times per day    heparin (porcine)  5,000 Units SubCUTAneous 3 times per day    gabapentin  300 mg Oral TID     Continuous Infusions:    sodium chloride Stopped (04/09/22 0636)    dextrose       PRN Medicationsloperamide, 2 mg, 4x Daily PRN  calcium carbonate, 500 mg, TID PRN  oxyCODONE-acetaminophen, 1 tablet, Q6H PRN  acetaminophen, 650 mg, Q4H PRN  sodium chloride, 1 spray, PRN  albuterol sulfate HFA, 2 puff, Q6H PRN  diazePAM, 5 mg, Q12H PRN  sodium chloride flush, 10 mL, PRN  sodium chloride, , PRN  magnesium sulfate, 1,000 mg, PRN  ondansetron, 4 mg, Q8H PRN   Or  ondansetron, 4 mg, Q6H PRN  polyethylene glycol, 17 g, Daily PRN  glucose, 15 g, PRN  dextrose, 12.5 g, PRN  glucagon (rDNA), 1 mg, PRN  dextrose, 100 mL/hr, PRN        Diagnostic Labs:  CBC:   No results for input(s): WBC, RBC, HGB, HCT, MCV, RDW, PLT in the last 72 hours. BMP:   Recent Labs     04/20/22  0542 04/21/22  0223 04/22/22  0540    136 134*   K 3.0* 3.4* 3.3*   CL 90* 93* 93*   CO2 32* 34* 29   BUN 19 17 16   CREATININE 1.29* 1.18* 1.02*     BNP: No results for input(s): BNP in the last 72 hours. PT/INR: No results for input(s): PROTIME, INR in the last 72 hours. APTT: No results for input(s): APTT in the last 72 hours. CARDIAC ENZYMES: No results for input(s): CKMB, CKMBINDEX, TROPONINI in the last 72 hours. Invalid input(s): CKTOTAL;3  FASTING LIPID PANEL:  Lab Results   Component Value Date    CHOL 144 11/17/2018    HDL 42 10/07/2020    TRIG 68 11/17/2018     LIVER PROFILE:   No results for input(s): AST, ALT, ALB, BILIDIR, BILITOT, ALKPHOS in the last 72 hours. MICROBIOLOGY:   Lab Results   Component Value Date/Time    CULTURE NO GROWTH 5 DAYS 04/09/2022 02:22 AM       Imaging:    XR CHEST PORTABLE    Result Date: 4/8/2022  Limited negative portable chest radiograph. ECHO Complete 2D W Doppler W Color    Result Date: 3/22/2022  Transthoracic Echocardiography Report (TTE)  Patient Name PRICE       Date of Study               03/22/2022               [facility]   Date of      1975  Gender                      Female  Birth   Age          55 year(s)  Race                        Black   Room Number  2008        Height:                     65.98 inch, 167.6 cm   Corporate ID [id number]    Weight:                     380 pounds, 172.4 kg  #   Patient Acct [de-identified]   BSA:          2.63 m^2      BMI:      61.36  #                                                              kg/m^2   MR #         [id number]     Deandretommy Mcihelle   Accession #  [id number]  Interpreting Physician      [address] [place] [address]   Fellow                   Referring Nurse                           Practitioner   Interpreting             Referring Physician         [facility]  Fellow  Additional Comments Technically difficult study. Type of Study   TTE procedure:2D Echocardiogram, M-Mode, Doppler, Color Doppler. Procedure Date Date: 03/22/2022 Start: 03:37 PM Study Location: [facility] Technical Quality: Adequate visualization Indications:Dyspnea/SOB. History / Tech. Comments: Echo done at patient bedside. Procedure explained to patient. HTN, COPD, PHTN, STEPH, HX NSTEMI Patient Status: Inpatient Height: 65.98 inches Weight: 380 pounds BSA: 2.63 m^2 BMI: 61.36 kg/m^2 Allergies   - Aspirin.   - Sulfa. CONCLUSIONS Summary Left ventricle is normal in size. Global left ventricular systolic function is normal. Calculated ejection fraction 60% by Heart Model. Marked Leftward compression of inter-ventricular septum (\"D-sign\") indicating RV volume or pressure overload. Right ventricular function appears reduced. Moderately dilated right ventricular cavity. Moderate to severe tricuspid regurgitation. Severe pulmonary hypertension. Estimated right ventricular systolic pressure is 82CCDD. Trivial pulmonic insufficiency. Signature ----------------------------------------------------------------------------  Electronically signed by Juliet Lambert(Sonographer) on 03/22/2022  04:14 PM ---------------------------------------------------------------------------- ----------------------------------------------------------------------------  Electronically signed by Don Ellington(Interpreting physician) on 03/23/2022  11:48 AM ---------------------------------------------------------------------------- FINDINGS Left Atrium Left atrium is normal in size. Left Ventricle Left ventricle is normal in size. Global left ventricular systolic function is normal. Calculated ejection fraction 60% by Heart Model. Marked Leftward compression of inter-ventricular septum (\"D-sign\") indicating RV volume or pressure overload. Right Atrium Right atrium is normal in size. Right Ventricle Right ventricular function appears reduced.  Moderately dilated right ventricular cavity. Mitral Valve Normal mitral valve structure and function. No mitral regurgitation. Aortic Valve Aortic valve is trileaflet and opens adequately. No aortic insufficiency. Tricuspid Valve No obvious valvular abnormality. Moderate to severe tricuspid regurgitation. Severe pulmonary hypertension. Estimated right ventricular systolic pressure is 17MYIW. Pulmonic Valve The pulmonic valve is normal in structure. Trivial pulmonic insufficiency. Pericardial Effusion No pericardial effusion seen. Miscellaneous E/E' average = 7.1. IVC normal diameter & inspiratory collapse indicating normal RA filling pressure .  M-mode / 2D Measurements & Calculations:   LVIDd:3.4 cm(3.7 - 5.6 cm)       Diastolic IPGFJY:45.8 ml  MXZWW:2.2 cm(2.2 - 4.0 cm)       Systolic THSBKV:31.3 ml  UIOK:4.0 cm(0.6 - 1.1 cm)        Aortic Root:2.8 cm(2.0 - 3.7 cm)  LVPWd:1 cm(0.6 - 1.1 cm)         LA Dimension: 2.4 cm(1.9 - 4.0 cm)  Fractional Shortenin.24 %    LA volume/Index: 47.93 ml /18m^2  Calculated LVEF (%): 60.84 %     LVOT:1.7 cm                                   RVDd:4.91 cm   Mitral:                                 Aortic   Valve Area (P1/2-Time): 3.86 cm^2       Peak Velocity: 1.66 m/s  Peak E-Wave: 0.75 m/s                   Mean Velocity: 1.04 m/s  Peak A-Wave: 0.60 m/s                   Peak Gradient: 11.02 mmHg  E/A Ratio: 1.25                         Mean Gradient: 5 mmHg  Peak Gradient: 2.25 mmHg  Mean Gradient: 2 mmHg  Deceleration Time: 195 msec             Area (continuity): 1.37 cm^2  P1/2t: 57 msec                          AV VTI: 29.9 cm   Area (continuity): 1.96 cm^2  Mean Velocity: 0.75 m/s   Tricuspid:                              Pulmonic:   Peak TR Velocity: 4.34 m/s              Peak Velocity: 1.22 m/s  Peak TR Gradient: 75.3424 mmHg          Peak Gradient: 5.95 mmHg  Diastology / Tissue Doppler Septal Wall E' velocity:0.10 m/s Septal Wall E/E':7.7 Lateral Wall E' velocity:0.11 m/s Lateral Wall E/E':6.6    XR ABDOMEN (KUB) (SINGLE AP VIEW)    Result Date: 4/1/2022  EXAMINATION: ONE SUPINE XRAY VIEW(S) OF THE ABDOMEN 4/1/2022 12:45 pm COMPARISON: CT dated 05/08/2019. HISTORY: ORDERING SYSTEM PROVIDED HISTORY: Constipation, right lower abdominal  pain TECHNOLOGIST PROVIDED HISTORY: Constipation, right lower abdominal  pain Reason for Exam: supine FINDINGS: Nonspecific bowel gas pattern is seen with gaseous distension of the stomach. Mild-to-moderate amount of stool is noted in the colon. Evaluation of free air is limited on this supine view. There appears to be Trujillo catheter in place. Nonspecific bowel gas pattern with mild-to-moderate amount of stool noted in the colon. Gaseous distention of the stomach. XR CHEST PORTABLE    Result Date: 4/10/2022  EXAMINATION: ONE XRAY VIEW OF THE CHEST 4/10/2022 9:12 am COMPARISON: 04/08/2022 HISTORY: Reason for Exam: Acute hypoxia admitted with Pul edema FINDINGS: Stable cardiomegaly. Mild central vascular congestion. Interval increased right perihilar/infrahilar opacity. No sizable effusion or discernible pneumothorax. Osseous structures grossly intact. *Stable cardiomegaly with mild central vascular congestion. *Interval increased right perihilar/infrahilar opacity which may represent developing pneumonia. XR CHEST PORTABLE    Result Date: 4/8/2022  EXAMINATION: ONE XRAY VIEW OF THE CHEST 4/8/2022 4:37 pm COMPARISON: 03/22/2022 HISTORY: ORDERING SYSTEM PROVIDED HISTORY: Shortness of breath, COPD and CHF, recent admission for pneumonia TECHNOLOGIST PROVIDED HISTORY: Shortness of breath, COPD and CHF, recent admission for pneumonia Reason for Exam: upr,sob, FINDINGS: Examination is limited by the patient's body habitus and by portable technique. Cardial pericardial silhouette is prominent but stable. There are low lung volumes is secondary bronchovascular crowding. No focal infiltrate is identified. No pneumothorax. No free air. No acute bony abnormality. Limited negative portable chest radiograph. XR CHEST PORTABLE    Result Date: 3/22/2022  EXAMINATION: ONE XRAY VIEW OF THE CHEST 3/22/2022 9:30 am COMPARISON: 03/19/2022 HISTORY: ORDERING SYSTEM PROVIDED HISTORY: improvement in pnuemonia TECHNOLOGIST PROVIDED HISTORY: improvement in pnuemonia Reason for Exam: ap uprt port chest FINDINGS: As compared to prior examination, there has been significant improvement in the right lower lobe infiltrate. Lungs appear clear. There is cardiomegaly noted. Bony structures unremarkable. Improvement in the the right basal infiltrate. XR CHEST PORTABLE    Result Date: 3/19/2022  EXAMINATION: ONE XRAY VIEW OF THE CHEST 3/19/2022 12:51 am COMPARISON: CT PA performed approximately 10 hours earlier. Portable chest obtained approximately 12 hours earlier. HISTORY: ORDERING SYSTEM PROVIDED HISTORY: Increasing O2 requirment TECHNOLOGIST PROVIDED HISTORY: Increasing O2 requirment Reason for Exam: shortness of breath, chest pain off and on   upright port FINDINGS: Mild cardiomegaly and normal pulmonary vasculature. Right worse than left bibasilar patchy airspace opacities which appear worse than exam 12 hours earlier. No pneumothorax or pleural effusion. Surrounding structures are unremarkable. Findings suggestive of worsening bibasilar pneumonia. XR CHEST PORTABLE    Result Date: 3/18/2022  EXAMINATION: ONE XRAY VIEW OF THE CHEST 3/18/2022 11:16 am COMPARISON: Chest x-ray dated 29 June 2021 HISTORY: ORDERING SYSTEM PROVIDED HISTORY: sob TECHNOLOGIST PROVIDED HISTORY: sob FINDINGS: Mild stable cardiomegaly with pulmonary vascular congestion. No pneumothorax or pleural effusion. Stable cardiomegaly with mild pulmonary vascular congestion.      CT CHEST PULMONARY EMBOLISM W CONTRAST    Result Date: 3/18/2022  EXAMINATION: CTA OF THE CHEST 3/18/2022 1:23 pm TECHNIQUE: CTA of the chest was performed after the administration of intravenous contrast.  Multiplanar reformatted images are provided for review. MIP images are provided for review. Dose modulation, iterative reconstruction, and/or weight based adjustment of the mA/kV was utilized to reduce the radiation dose to as low as reasonably achievable. COMPARISON: None HISTORY: ORDERING SYSTEM PROVIDED HISTORY: sedentary lifestyle, leg swelling, increased O2 TECHNOLOGIST PROVIDED HISTORY: sedentary lifestyle, leg swelling, increased O2 Decision Support Exception - unselect if not a suspected or confirmed emergency medical condition->Emergency Medical Condition (MA) Reason for Exam: sedentary lifestyle, leg swelling, increased O2 FINDINGS: Pulmonary Arteries: Pulmonary arteries are adequately opacified for evaluation. No evidence of intraluminal filling defect to suggest pulmonary embolism. Main pulmonary artery is normal in caliber. Mediastinum: No evidence of mediastinal lymphadenopathy. Small reactive lymph nodes seen in the mediastinum and hilar regions. The heart and pericardium demonstrate no acute abnormality. There is no acute abnormality of the thoracic aorta. Lungs/pleura: Patchy ground-glass opacity and increased markings seen in the lower lung fields bilaterally concerning for bibasilar infiltrate. No pleural effusion or pneumothorax. No pulmonary mass or nodule. Patchy ground-glass opacity in the upper lung fields bilaterally. Upper Abdomen: Limited images of the upper abdomen are unremarkable. Soft Tissues/Bones: No acute bone or soft tissue abnormality. Degenerative changes seen within the spine with no chest wall abnormality. No evidence of pulmonary embolism. There is patchy ill-defined opacification lower lung fields bilaterally suggesting infiltrate with ground-glass opacity concerning for pneumonia as well in the upper lung fields. Reactive adenopathy throughout the mediastinum and hilar regions.  RECOMMENDATIONS: Unavailable     VL DUP LOWER EXTREMITY VENOUS BILATERAL    Result Date: 3/19/2022    [facility]  Vascular Lower Extremities DVT Study Procedure   Patient Name  CARMEN       Date of Study           03/18/2022                [address]   Date of Birth 1975  Gender                  Female   Age           55 year(s)  Race                    Black   Room Number   2008        Height:                 65.98 inch, 167.6 cm   Corporate ID  [id number]    Weight:                 380 pounds, 172.4 kg  #   Patient Acct  [de-identified]   BSA:        2.63 m^2    BMI:       61.36 kg/m^2  #   MR #          [id number]     Sonographer             Silvia Flynn   Accession #   [id number]  Interpreting Physician  Zulema Borges   Referring                 Referring Physician     Kyler Cheema. Errol Lubin DO  Nurse  Practitioner  Procedure Type of Study:   Veins: Lower Extremities DVT Study, Venous Scan Lower Bilateral.  Indications for Study:Leg Swelling and Shortness of breath. Patient Status:ER. Technical Quality:Limited visualization. Limitation reason:bedside exam , body habitus, deep vessels, edema. Conclusions   Summary   No evidence of superficial or deep venous thrombosis in both lower  extremities. Signature   ----------------------------------------------------------------  Electronically signed by NIKKI Ellison(Sonographer) on  03/18/2022 01:27 PM  ----------------------------------------------------------------   ----------------------------------------------------------------  Electronically signed by Marlowe Gardener Reyes,Arthur(Interpreting  physician) on 03/19/2022 07:28 AM  ----------------------------------------------------------------  Findings:   Right Impression:                    Left Impression:  The common femoral, femoral,         The common femoral, femoral,  popliteal and tibial veins           popliteal and tibial veins  demonstrate normal compressibility   demonstrate normal compressibility  and augmentation. and augmentation.   Normal compressibility of the great  Normal compressibility of the great  saphenous vein. saphenous vein. Normal compressibility of the small  Normal compressibility of the small  saphenous vein. saphenous vein. Limited visualization of the         Limited visualization of the  posterior tibial and peroneal veins. posterior tibial and peroneal veins. Risk Factors History +-------------------------------------------+----------+-------------------+ ! Diagnosis                                  ! Date      ! Comments           ! +-------------------------------------------+----------+-------------------+ ! Previous Scan                              !04/04/2008! WNL                ! +-------------------------------------------+----------+-------------------+ ! Chronic lung disease->COPD                 !          !                   ! +-------------------------------------------+----------+-------------------+ ! Previous Scan                              !09/22/2014! Bilateral WNL      ! +-------------------------------------------+----------+-------------------+ ! CHF                                        ! !                   ! +-------------------------------------------+----------+-------------------+   - The patient's risk factor(s) include: chronic lung disease, dyslipidemia     and arterial hypertension.   - The patient has a former tobacco history. Allergies   - Allergy:Aspirin(Drug). - Allergy:Sulfa(Drug). Velocities are measured in cm/s ; Diameters are measured in cm Right Lower Extremities DVT Study Measurements Right 2D Measurements +------------------------------------+----------+---------------+----------+ ! Location                            ! Visualized! Compressibility! Thrombosis! +------------------------------------+----------+---------------+----------+ ! Common Femoral                      !Yes       ! Yes            ! None      ! +------------------------------------+----------+---------------+----------+ ! Prox Femoral                        !Yes       ! Yes            ! None      ! +------------------------------------+----------+---------------+----------+ ! Mid Femoral                         !Partial   !Yes            ! None      ! +------------------------------------+----------+---------------+----------+ ! Dist Femoral                        !Partial   !Yes            ! None      ! +------------------------------------+----------+---------------+----------+ ! Popliteal                           !Yes       ! Yes            ! None      ! +------------------------------------+----------+---------------+----------+ ! Sapheno Femoral Junction            ! Yes       ! Yes            ! None      ! +------------------------------------+----------+---------------+----------+ ! PTV                                 ! Partial   !Yes            ! None      ! +------------------------------------+----------+---------------+----------+ ! Peroneal                            !Partial   !Yes            ! None      ! +------------------------------------+----------+---------------+----------+ ! Gastroc                             ! Yes       ! Yes            ! None      ! +------------------------------------+----------+---------------+----------+ ! GSV Thigh                           ! Yes       ! Yes            ! None      ! +------------------------------------+----------+---------------+----------+ ! GSV Knee                            ! Yes       ! Yes            ! None      ! +------------------------------------+----------+---------------+----------+ ! GSV Ankle                           ! Yes       ! Yes            ! None      ! +------------------------------------+----------+---------------+----------+ ! SSV                                 ! Yes       ! Yes            ! None      ! +------------------------------------+----------+---------------+----------+ Right Doppler Measurements +--------------------------+---------+------+------------------------------+ ! Location                  ! Signal   !Reflux! Reflux (msec)                 ! +--------------------------+---------+------+------------------------------+ ! Common Femoral            !Pulsatile!      !                              ! +--------------------------+---------+------+------------------------------+ ! Prox Femoral              !Pulsatile!      !                              ! +--------------------------+---------+------+------------------------------+ ! Popliteal                 !Pulsatile!      !                              ! +--------------------------+---------+------+------------------------------+ Left Lower Extremities DVT Study Measurements Left 2D Measurements +------------------------------------+----------+---------------+----------+ ! Location                            ! Visualized! Compressibility! Thrombosis! +------------------------------------+----------+---------------+----------+ ! Common Femoral                      !Yes       ! Yes            ! None      ! +------------------------------------+----------+---------------+----------+ ! Prox Femoral                        !Yes       ! Yes            ! None      ! +------------------------------------+----------+---------------+----------+ ! Mid Femoral                         !Partial   !Yes            ! None      ! +------------------------------------+----------+---------------+----------+ ! Dist Femoral                        !Partial   !Yes            ! None      ! +------------------------------------+----------+---------------+----------+ ! Popliteal                           !Yes       ! Yes            ! None      ! +------------------------------------+----------+---------------+----------+ ! Sapheno Femoral Junction            ! Yes       ! Yes            ! None      ! +------------------------------------+----------+---------------+----------+ ! PTV !Partial   !Yes            ! None      ! +------------------------------------+----------+---------------+----------+ ! Peroneal                            !Partial   !Yes            ! None      ! +------------------------------------+----------+---------------+----------+ ! Gastroc                             ! Yes       ! Yes            ! None      ! +------------------------------------+----------+---------------+----------+ ! GSV Thigh                           ! Yes       ! Yes            ! None      ! +------------------------------------+----------+---------------+----------+ ! GSV Knee                            ! Yes       ! Yes            ! None      ! +------------------------------------+----------+---------------+----------+ ! GSV Ankle                           ! Yes       ! Yes            ! None      ! +------------------------------------+----------+---------------+----------+ ! SSV                                 ! Yes       ! Yes            ! None      ! +------------------------------------+----------+---------------+----------+ Left Doppler Measurements +--------------------------+---------+------+------------------------------+ ! Location                  ! Signal   !Reflux! Reflux (msec)                 ! +--------------------------+---------+------+------------------------------+ ! Common Femoral            !Pulsatile!      !                              ! +--------------------------+---------+------+------------------------------+ ! Prox Femoral              !Pulsatile!      !                              ! +--------------------------+---------+------+------------------------------+ ! Popliteal                 !Pulsatile!      !                              ! +--------------------------+---------+------+------------------------------+    FL MODIFIED BARIUM SWALLOW W VIDEO    Result Date: 3/19/2022  EXAMINATION: MODIFIED BARIUM SWALLOW WAS PERFORMED IN CONJUNCTION WITH SPEECH PATHOLOGY SERVICES TECHNIQUE: Fluoroscopic evaluation of the swallowing mechanism was performed using cineradiography with multiple consistency of barium product in conjunction with speech pathology services. FLUOROSCOPY DOSE AND TYPE OR TIME AND EXPOSURES: Fluoro time: 1.1 minute DAP: 22.570 mGy COMPARISON: None HISTORY: ORDERING SYSTEM PROVIDED HISTORY: h/o esophageal stricture per patient TECHNOLOGIST PROVIDED HISTORY: h/o esophageal stricture per patient 58-year-old female with history of esophageal stricture FINDINGS: No penetration or aspiration with the thin liquid and thick liquid substance by straw, pureed/pudding thick, soft solid and cookie solid substances. No penetration or aspiration with the above administered substances. Please see separate speech pathology report for full discussion of findings and recommendations. ASSESSMENT & PLAN     ASSESSMENT / PLAN:     Active Problems:    Diarrhea    Asthma    HTN (hypertension)    Acute respiratory failure with hypoxia and hypercapnia (HCC)    Morbid obesity ([facility] [address]    Pulmonary hypertension, moderate to severe (HCC)    Morbid obesity with BMI of 50.0-59.9, adult (HCC)    Obesity hypoventilation syndrome (HCC)    STEPH (obstructive sleep apnea)    Chronic respiratory failure (HCC)    Right heart failure, unspecified (HCC)    Normocytic anemia    Hyperlipidemia    Cor pulmonale, chronic (HCC)  Resolved Problems:    * No resolved hospital problems. *      Acute on chronic hypoxic hypercapnic respiratory failure likely secondary to severe pulmonary hypertension, acute on chronic cor pulmonale with history of STEPH/OHS/morbid obesity, asthma/COPD, prior history of tracheostomy  -Continues to be on diuretics 40 IV twice daily, bronchodilators, pulmonology following  -Continues to be on high flow nasal cannula 50% FiO2 25 L, alternating with BiPAP  -Long-term plan to have trilogy ventilator, pending insurance/physician review approval.  -Patient rejected at [facility].   Will likely go to SNF.    Concern for pneumonia-completed 7-day course of Levaquin this admission on 4/17    Essential HTN continue current regimen. Anemia of chronic disease continue iron, folic acid, Z18 supplementation    Diabetes mellitus type 2 - continue sliding scale. Hypokalemia- monitor and replace    Diarrhea - resolved    DVT ppx: heparin  GI ppx: protonix   Diet: Regular    Patient has been seen by palliative care on 4/12/2022. Patient and family want everything to be done to the patient. CODE STATUS -full code. PT/OT/SW : On board. Discharge Planning:  Discharge to [facility].  assisting with transitional planning. Called  [phone] for peer to peer. Patient denied ,  Second appeal at [facility] being done by KAYLAH Steele MD  Internal Medicine Resident, PGY-2  [facility];  [place], [place]  4/22/2022, 10:19 AM

## 2022-04-22 NOTE — PROGRESS NOTES
Physical Therapy  Facility/Department: [facility] 2  Physical Therapy    Name: Lori Barrow  : 1975  MRN: [id number]  Date of Service: 2022    Discharge Recommendations:  Patient would benefit from continued therapy after discharge   PT Equipment Recommendations  Equipment Needed: No  Other: pt naomi requires significant physical assistance for all aspects of mobility      Patient Diagnosis(es): The encounter diagnosis was Acute on chronic congestive heart failure, unspecified heart failure type ([address] Past Medical History:  has a past medical history of Acute on chronic diastolic CHF (congestive heart failure) ([address] HIEN (acute kidney injury) ([address] HIEN (acute kidney injury) ([address] Asthma, CHF, Chronic obstructive lung disease ([address] Chronic respiratory failure with hypoxia ([address] COPD, Diabetes mellitus, new onset ([address] Former smoker, HTN, Hyperglycemia, Hyperlipidemia, Hypertension, Morbid obesity with BMI of 60.0-69.9, adult ([address] Neuromuscular disorder ([address] STEPH on CPAP, Oxygen dependent, Pedal edema, Pneumonia, Pulmonary hypertension, moderate to severe ([address] Torn meniscus, and Wears glasses. Past Surgical History:  has a past surgical history that includes  section (); Abdomen surgery; joint replacement; Cystoscopy (2019); Cystoscopy (N/A, 2019); hc cath power picc triple (2018); pr office/outpt visit,procedure only (N/A, 2018); Tracheotomy (2018); Gastrostomy tube placement (2018); and tracheostomy closure (2018). Assessment   Body Structures, Functions, Activity Limitations Requiring Skilled Therapeutic Intervention: Decreased functional mobility ; Decreased balance;Decreased ROM; Decreased strength;Decreased endurance; Increased pain  Assessment: Able to sit up with one person assist.  Became distressed appearing after a total of 4' of gait along edge of bed. Tachycardic, saturation at 90% on high flow.   Patient needs further PT to regain functional independence. Requires PT Follow-Up: Yes  Activity Tolerance  Activity Tolerance: Patient limited by fatigue;Patient limited by endurance     Plan   Plan  Plan: 5-7 times per week  Current Treatment Recommendations: Strengthening,ROM,Functional mobility training,Transfer training,Endurance training,Gait training,Home exercise program,Safety education & training,Patient/Caregiver education & training  Safety Devices  Type of Devices: Call light within reach,Left in bed,Patient at risk for falls,Gait belt  Restraints  Restraints Initially in Place: No  Restraints: 4 bed rails per pt request     Restrictions  Restrictions/Precautions  Restrictions/Precautions: Fall Risk,Up as Tolerated,General Precautions  Required Braces or Orthoses?: No  Position Activity Restriction  Other position/activity restrictions: up w/assist. Hi-yair O2 . Subjective   General  Chart Reviewed: Yes  Patient assessed for rehabilitation services?: Yes  Family / Caregiver Present: No  Follows Commands: Within Functional Limits  Subjective  Subjective: Pt and RN agreeable to PT. Pt on high flow O2 upon arrival, pleasant and cooperative throughout. Pain Assessment  Pain Assessment: 0-10  Pain Level: 5  Pain Location: Wrist;Knee  Pain Orientation: Right  Pain Type: Chronic pain  Vital Signs  Pulse: 127 (at max with exertion)  Oxygen Therapy  SpO2: 90 %  O2 Device: Heated high flow cannula    Cognition   Orientation  Overall Orientation Status: Within Functional Limits     Objective       Bed mobility  Rolling to Right: Minimal assistance  Supine to Sit: Minimal assistance  Sit to Supine: Moderate assistance  Transfers  Sit to Stand: Minimal Assistance  Stand to sit: Minimal Assistance  Ambulation  Surface: level tile  Device: Standard Walker  Assistance: Minimal assistance  Quality of Gait: Educated to ambulate along edge of bed instead of away from the bed. Ambulated 2' down, 2' back, pulse up to 127, looking distressed.   Cued to sit down. Gait Deviations: Slow Marina; Increased NOEL; Decreased step length  Distance: 4'        A/AROM Exercises: Ankle pumps x15, heel slides x10 reps, SAQ x10 reps        AM-PAC Score  AM-PAC Inpatient Mobility Raw Score : 14 (04/22/22 1521)  AM-PAC Inpatient T-Scale Score : 38.1 (04/22/22 1521)  Mobility Inpatient CMS 0-100% Score: 61.29 (04/22/22 1521)  Mobility Inpatient CMS G-Code Modifier : CL (04/22/22 1521)          Goals  Short Term Goals  Time Frame for Short term goals: 14  Short term goal 1: Pt to perform bed mobility [place]  Short term goal 2: Pt to attempt functional transfers [place]. STG2 met. Update STG 2: Transfer sit to/from stand with SBA. Short term goal 3: Demonstrate standing balance of good - to decrease fall risk  Short term goal 4: Actively participate in 30 minutes of therapy to demo increased endurance  Short term goal 5: Pt to ambulate 10ft w/ RW [place]uy  Patient Goals   Patient goals :  To get well       Therapy Time   Individual Concurrent Group Co-treatment   Time In [address] [place] [address]         Time Out 0318         Minutes 37                 Phillip James, PT

## 2022-04-23 NOTE — PROGRESS NOTES
[facility]  Internal Medicine Teaching Residency Program  Inpatient Daily Progress Note  ______________________________________________________________________________    Patient: Tree Tanner  YOB: 1975   SCJ:[id number]    Acct: [de-identified]     Room: 2004/2004-01  Admit date: 4/8/2022  Today's date: 04/23/22  Number of days in the hospital: 15    SUBJECTIVE   Admitting Diagnosis: Acute respiratory failure with hypoxia and hypercapnia (HCC)  CC: SOB  Pt examined at bedside. Chart & results reviewed. No acute events overnight. Patient is afebrile and hemodynamically stable. Blood pressure on the soft side. No more nausea, vomiting or loose stools. Continued on HFNC 50% FiO2 25 L, alternating with BIPAP  On diuresis with Lasix 40 Mg IV twice daily . Second appeal denied at [employer]. Patient would like to pursue SNF.     ROS:  Constitutional:  negative for chills, fevers, sweats  Respiratory:  negative for cough, wheezing, hemoptysis  Cardiovascular: negative for chest pain , SOB, lower extremity edema. Gastrointestinal:, negative for abdominal pain, constipation,  nausea, vomiting  Neurological:  negative for dizziness, headache    BRIEF HISTORY     The patient is a pleasant 55 y.o. 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 Patient states she has had increased shortness of breath the past 2 nights, unable to wear her CPAP nightly with increased fatigue. Patient is poor historian, states she wears 7 L O2 at home, however EMS on arrival saw patient on 4 L and was saturating in the mid 80s. Patient was placed on nonrebreather by EMS and saturations improved to 98%.     Of note, patient had previous hospitalization earlier this month for COPD exacerbation complicated by pneumonia. At that time patient was treated with antibiotics and steroids.   Pulmonology was consulted and patient was approved for trilogy noninvasive ventilator which she has yet to receive. Echocardiogram at that time showed EF 60%, \"D\" sign indicating RV pressure overload, moderate to severe MR, severe pulmonary hypertension. Patient is aware of these findings.     On my evaluation, patient resting comfortably on 15 L nonrebreather mask acute distress. Patient is morbidly obese with BMI 57. Denies any cough, chest pain, leg pain/swelling. She does have skin irritation on her buttocks. States she has been compliant with all meds (inculding bronchodilators and bumex 2mg BID) except for her PO DM meds as those were held on previous admission. Afebrile, hemodynamic stable, tachycardic in the 110s at present. Patient received prednisone and lasix 40mg IV in ED as well as rocephin and vancomycin. CXR in ED - limited negative CXR d/t body habitus.     Significant labs include proBNP on admission 4192, was in 1000s on recent discharge. Leukocytosis of 17.0. Hemoglobin 9.7. Slight bump in creatinine from baseline- 1.21. glucose 238.       4/10/2022-episode of hypoxia, patient drank 3.5 L of fluid admitted with CHF exacerbation, getting a chest x-ray, IV diuretics to continue, labs de-escalate, prednisone discontinued. 2022 - Patient has drank 2.5 L in the last 24 hours. Admitted with CHF exacerbation, currently on IV Lasix 80 mg IV twice daily. Did receive this morning. We will get a chest x-ray to look for any worsening pulmonary edema. Mild edema bilateral lower extremities noted. Labs Descalated, prednisone discontinued    2022 - Overnight BG went up to 423 and then insulin Lantus was increased to 20 nightly.     2022 -Lasix changed from 80 mg to 40 mg IV twice daily       OBJECTIVE     Vital Signs:  BP (!) 119/46   Pulse 91   Temp 98.6 °F (37 °C) (Oral)   Resp 23   Ht 5' 6\" (1.676 m)   Wt (!) 334 lb 3.5 oz (151.6 kg)   SpO2 (!) 84%   BMI 53.94 kg/m²     Temp (24hrs), Av.5 °F (36.9 °C), Min:98.2 °F (36.8 °C), Max:98.8 °F (37.1 °C)    In: 10   Out: -     Physical Exam:  Constitutional: This is a well developed, well nourished, Greater than 40 - Morbid Obesity / Extreme Obesity / Grade III 55y.o. year old female who is alert, oriented, cooperative and in no apparent distress. Head:normocephalic and atraumatic. On high flow nasal cannula  EENT:  PERRLA. No conjunctival injections. Septum was midline, mucosa was without erythema, exudates or cobblestoning. No thrush was noted. Neck: Supple without thyromegaly. No elevated JVP. Trachea was midline. Respiratory: Breath sounds clear to auscultation bilaterally. No crackles, wheezes or rales. Cardiovascular: HR regular without murmur, clicks, gallops or rubs. Abdomen: Abdomen soft bowel sounds present. No organomegaly. Lymphatic: No lymphadenopathy. Musculoskeletal: Normal curvature of the spine. No gross muscle weakness. Extremities: Bilateral lower extremity edema improved. Skin:  Warm and dry. Good color, turgor and pigmentation. No lesions or scars.   No cyanosis or clubbing  Neurological/Psychiatric: The patient's general behavior, level of consciousness, thought content and emotional status is normal.        Medications:  Scheduled Medications:    potassium chloride  20 mEq Oral BID WC    lactobacillus  1 capsule Oral Daily with breakfast    furosemide  40 mg IntraVENous BID    lidocaine  1 patch TransDERmal Daily    sertraline  100 mg Oral Daily    albuterol  2.5 mg Nebulization 4x daily    amLODIPine  5 mg Oral Daily    atorvastatin  40 mg Oral Nightly    budesonide-formoterol  2 puff Inhalation BID    ferrous sulfate  325 mg Oral BID WC    folic acid  1 mg Oral Daily    isosorbide dinitrate  20 mg Oral TID    pantoprazole  40 mg Oral QAM AC    spironolactone  25 mg Oral Daily    tiotropium  2 puff Inhalation Daily    vitamin B-12  1,000 mcg Oral Daily    sodium chloride flush  10 mL IntraVENous 2 times per day    heparin (porcine)  5,000 Units SubCUTAneous 3 times per day    gabapentin  300 mg Oral TID     Continuous Infusions:    sodium chloride Stopped (04/09/22 0636)    dextrose       PRN Medicationsloperamide, 2 mg, 4x Daily PRN  calcium carbonate, 500 mg, TID PRN  oxyCODONE-acetaminophen, 1 tablet, Q6H PRN  acetaminophen, 650 mg, Q4H PRN  sodium chloride, 1 spray, PRN  albuterol sulfate HFA, 2 puff, Q6H PRN  diazePAM, 5 mg, Q12H PRN  sodium chloride flush, 10 mL, PRN  sodium chloride, , PRN  magnesium sulfate, 1,000 mg, PRN  ondansetron, 4 mg, Q8H PRN   Or  ondansetron, 4 mg, Q6H PRN  polyethylene glycol, 17 g, Daily PRN  glucose, 15 g, PRN  dextrose, 12.5 g, PRN  glucagon (rDNA), 1 mg, PRN  dextrose, 100 mL/hr, PRN        Diagnostic Labs:  CBC:   No results for input(s): WBC, RBC, HGB, HCT, MCV, RDW, PLT in the last 72 hours. BMP:   Recent Labs     04/21/22  0223 04/22/22  0540 04/23/22  0419    134* 133*   K 3.4* 3.3* 3.5*   CL 93* 93* 96*   CO2 34* 29 24   BUN 17 16 16   CREATININE 1.18* 1.02* 1.05*     BNP: No results for input(s): BNP in the last 72 hours. PT/INR: No results for input(s): PROTIME, INR in the last 72 hours. APTT: No results for input(s): APTT in the last 72 hours. CARDIAC ENZYMES: No results for input(s): CKMB, CKMBINDEX, TROPONINI in the last 72 hours. Invalid input(s): CKTOTAL;3  FASTING LIPID PANEL:  Lab Results   Component Value Date    CHOL 144 11/17/2018    HDL 42 10/07/2020    TRIG 68 11/17/2018     LIVER PROFILE:   No results for input(s): AST, ALT, ALB, BILIDIR, BILITOT, ALKPHOS in the last 72 hours. MICROBIOLOGY:   Lab Results   Component Value Date/Time    CULTURE NO GROWTH 5 DAYS 04/09/2022 02:22 AM       Imaging:    XR CHEST PORTABLE    Result Date: 4/8/2022  Limited negative portable chest radiograph.      ECHO Complete 2D W Doppler W Color    Result Date: 3/22/2022  Transthoracic Echocardiography Report (TTE)  Patient Name PRICE       Date of Study 03/22/2022               Stacie Drivers   Date of      1975  Gender                      Female  Birth   Age          55 year(s)  Race                        Black   Room Number  2008        Height:                     65.98 inch, 167.6 cm   Corporate ID [id number]    Weight:                     380 pounds, 172.4 kg  #   Patient Acct [de-identified]   BSA:          2.63 m^2      BMI:      61.36  #                                                              kg/m^2   MR #         [id number]     Sonographer                 Zarina Katz   Accession #  [id number]  Interpreting Physician      [address] [place] [address]   Fellow                   Referring Nurse                           Practitioner   Interpreting             Referring Physician         Akira RAJANGeneral acute hospital  Fellow  Additional Comments Technically difficult study. Type of Study   TTE procedure:2D Echocardiogram, M-Mode, Doppler, Color Doppler. Procedure Date Date: 03/22/2022 Start: 03:37 PM Study Location: [facility] Technical Quality: Adequate visualization Indications:Dyspnea/SOB. History / Tech. Comments: Echo done at patient bedside. Procedure explained to patient. HTN, COPD, PHTN, STEPH, HX NSTEMI Patient Status: Inpatient Height: 65.98 inches Weight: 380 pounds BSA: 2.63 m^2 BMI: 61.36 kg/m^2 Allergies   - Aspirin.   - Sulfa. CONCLUSIONS Summary Left ventricle is normal in size. Global left ventricular systolic function is normal. Calculated ejection fraction 60% by Heart Model. Marked Leftward compression of inter-ventricular septum (\"D-sign\") indicating RV volume or pressure overload. Right ventricular function appears reduced. Moderately dilated right ventricular cavity. Moderate to severe tricuspid regurgitation. Severe pulmonary hypertension. Estimated right ventricular systolic pressure is 68FCSM. Trivial pulmonic insufficiency.  Signature ----------------------------------------------------------------------------  Electronically signed by Juliet Cevallos(Sonographer) on 2022  04:14 PM ---------------------------------------------------------------------------- ----------------------------------------------------------------------------  Electronically signed by Don Ellington(Interpreting physician) on 2022  11:48 AM ---------------------------------------------------------------------------- FINDINGS Left Atrium Left atrium is normal in size. Left Ventricle Left ventricle is normal in size. Global left ventricular systolic function is normal. Calculated ejection fraction 60% by Heart Model. Marked Leftward compression of inter-ventricular septum (\"D-sign\") indicating RV volume or pressure overload. Right Atrium Right atrium is normal in size. Right Ventricle Right ventricular function appears reduced. Moderately dilated right ventricular cavity. Mitral Valve Normal mitral valve structure and function. No mitral regurgitation. Aortic Valve Aortic valve is trileaflet and opens adequately. No aortic insufficiency. Tricuspid Valve No obvious valvular abnormality. Moderate to severe tricuspid regurgitation. Severe pulmonary hypertension. Estimated right ventricular systolic pressure is 97RBEQ. Pulmonic Valve The pulmonic valve is normal in structure. Trivial pulmonic insufficiency. Pericardial Effusion No pericardial effusion seen. Miscellaneous E/E' average = 7.1. IVC normal diameter & inspiratory collapse indicating normal RA filling pressure .  M-mode / 2D Measurements & Calculations:   LVIDd:3.4 cm(3.7 - 5.6 cm)       Diastolic MTJQSK:74.2 ml  QMYMQ:1.6 cm(2.2 - 4.0 cm)       Systolic RRGOIS:38.7 ml  CQDN:7.5 cm(0.6 - 1.1 cm)        Aortic Root:2.8 cm(2.0 - 3.7 cm)  LVPWd:1 cm(0.6 - 1.1 cm)         LA Dimension: 2.4 cm(1.9 - 4.0 cm)  Fractional Shortenin.24 %    LA volume/Index: 47.93 ml /18m^2  Calculated LVEF (%): 60.84 %     LVOT:1.7 cm                                   RVDd:4.91 cm   Mitral:                                 Aortic Valve Area (P1/2-Time): 3.86 cm^2       Peak Velocity: 1.66 m/s  Peak E-Wave: 0.75 m/s                   Mean Velocity: 1.04 m/s  Peak A-Wave: 0.60 m/s                   Peak Gradient: 11.02 mmHg  E/A Ratio: 1.25                         Mean Gradient: 5 mmHg  Peak Gradient: 2.25 mmHg  Mean Gradient: 2 mmHg  Deceleration Time: 195 msec             Area (continuity): 1.37 cm^2  P1/2t: 57 msec                          AV VTI: 29.9 cm   Area (continuity): 1.96 cm^2  Mean Velocity: 0.75 m/s   Tricuspid:                              Pulmonic:   Peak TR Velocity: 4.34 m/s              Peak Velocity: 1.22 m/s  Peak TR Gradient: 75.3424 mmHg          Peak Gradient: 5.95 mmHg  Diastology / Tissue Doppler Septal Wall E' velocity:0.10 m/s Septal Wall E/E':7.7 Lateral Wall E' velocity:0.11 m/s Lateral Wall E/E':6.6    XR ABDOMEN (KUB) (SINGLE AP VIEW)    Result Date: 4/1/2022  EXAMINATION: ONE SUPINE XRAY VIEW(S) OF THE ABDOMEN 4/1/2022 12:45 pm COMPARISON: CT dated 05/08/2019. HISTORY: ORDERING SYSTEM PROVIDED HISTORY: Constipation, right lower abdominal  pain TECHNOLOGIST PROVIDED HISTORY: Constipation, right lower abdominal  pain Reason for Exam: supine FINDINGS: Nonspecific bowel gas pattern is seen with gaseous distension of the stomach. Mild-to-moderate amount of stool is noted in the colon. Evaluation of free air is limited on this supine view. There appears to be Trujillo catheter in place. Nonspecific bowel gas pattern with mild-to-moderate amount of stool noted in the colon. Gaseous distention of the stomach. XR CHEST PORTABLE    Result Date: 4/10/2022  EXAMINATION: ONE XRAY VIEW OF THE CHEST 4/10/2022 9:12 am COMPARISON: 04/08/2022 HISTORY: Reason for Exam: Acute hypoxia admitted with Pul edema FINDINGS: Stable cardiomegaly. Mild central vascular congestion. Interval increased right perihilar/infrahilar opacity. No sizable effusion or discernible pneumothorax. Osseous structures grossly intact.      *Stable cardiomegaly with mild central vascular congestion. *Interval increased right perihilar/infrahilar opacity which may represent developing pneumonia. XR CHEST PORTABLE    Result Date: 4/8/2022  EXAMINATION: ONE XRAY VIEW OF THE CHEST 4/8/2022 4:37 pm COMPARISON: 03/22/2022 HISTORY: ORDERING SYSTEM PROVIDED HISTORY: Shortness of breath, COPD and CHF, recent admission for pneumonia TECHNOLOGIST PROVIDED HISTORY: Shortness of breath, COPD and CHF, recent admission for pneumonia Reason for Exam: upr,sob, FINDINGS: Examination is limited by the patient's body habitus and by portable technique. Cardial pericardial silhouette is prominent but stable. There are low lung volumes is secondary bronchovascular crowding. No focal infiltrate is identified. No pneumothorax. No free air. No acute bony abnormality. Limited negative portable chest radiograph. XR CHEST PORTABLE    Result Date: 3/22/2022  EXAMINATION: ONE XRAY VIEW OF THE CHEST 3/22/2022 9:30 am COMPARISON: 03/19/2022 HISTORY: ORDERING SYSTEM PROVIDED HISTORY: improvement in pnuemonia TECHNOLOGIST PROVIDED HISTORY: improvement in pnuemonia Reason for Exam: ap uprt port chest FINDINGS: As compared to prior examination, there has been significant improvement in the right lower lobe infiltrate. Lungs appear clear. There is cardiomegaly noted. Bony structures unremarkable. Improvement in the the right basal infiltrate. XR CHEST PORTABLE    Result Date: 3/19/2022  EXAMINATION: ONE XRAY VIEW OF THE CHEST 3/19/2022 12:51 am COMPARISON: CT PA performed approximately 10 hours earlier. Portable chest obtained approximately 12 hours earlier. HISTORY: ORDERING SYSTEM PROVIDED HISTORY: Increasing O2 requirment TECHNOLOGIST PROVIDED HISTORY: Increasing O2 requirment Reason for Exam: shortness of breath, chest pain off and on   upright port FINDINGS: Mild cardiomegaly and normal pulmonary vasculature.   Right worse than left bibasilar patchy airspace opacities which appear worse than exam 12 hours earlier. No pneumothorax or pleural effusion. Surrounding structures are unremarkable. Findings suggestive of worsening bibasilar pneumonia. XR CHEST PORTABLE    Result Date: 3/18/2022  EXAMINATION: ONE XRAY VIEW OF THE CHEST 3/18/2022 11:16 am COMPARISON: Chest x-ray dated 29 June 2021 HISTORY: ORDERING SYSTEM PROVIDED HISTORY: sob TECHNOLOGIST PROVIDED HISTORY: sob FINDINGS: Mild stable cardiomegaly with pulmonary vascular congestion. No pneumothorax or pleural effusion. Stable cardiomegaly with mild pulmonary vascular congestion. CT CHEST PULMONARY EMBOLISM W CONTRAST    Result Date: 3/18/2022  EXAMINATION: CTA OF THE CHEST 3/18/2022 1:23 pm TECHNIQUE: CTA of the chest was performed after the administration of intravenous contrast.  Multiplanar reformatted images are provided for review. MIP images are provided for review. Dose modulation, iterative reconstruction, and/or weight based adjustment of the mA/kV was utilized to reduce the radiation dose to as low as reasonably achievable. COMPARISON: None HISTORY: ORDERING SYSTEM PROVIDED HISTORY: sedentary lifestyle, leg swelling, increased O2 TECHNOLOGIST PROVIDED HISTORY: sedentary lifestyle, leg swelling, increased O2 Decision Support Exception - unselect if not a suspected or confirmed emergency medical condition->Emergency Medical Condition (MA) Reason for Exam: sedentary lifestyle, leg swelling, increased O2 FINDINGS: Pulmonary Arteries: Pulmonary arteries are adequately opacified for evaluation. No evidence of intraluminal filling defect to suggest pulmonary embolism. Main pulmonary artery is normal in caliber. Mediastinum: No evidence of mediastinal lymphadenopathy. Small reactive lymph nodes seen in the mediastinum and hilar regions. The heart and pericardium demonstrate no acute abnormality. There is no acute abnormality of the thoracic aorta.  Lungs/pleura: Patchy ground-glass opacity and increased markings seen in the lower lung fields bilaterally concerning for bibasilar infiltrate. No pleural effusion or pneumothorax. No pulmonary mass or nodule. Patchy ground-glass opacity in the upper lung fields bilaterally. Upper Abdomen: Limited images of the upper abdomen are unremarkable. Soft Tissues/Bones: No acute bone or soft tissue abnormality. Degenerative changes seen within the spine with no chest wall abnormality. No evidence of pulmonary embolism. There is patchy ill-defined opacification lower lung fields bilaterally suggesting infiltrate with ground-glass opacity concerning for pneumonia as well in the upper lung fields. Reactive adenopathy throughout the mediastinum and hilar regions. RECOMMENDATIONS: Unavailable     VL DUP LOWER EXTREMITY VENOUS BILATERAL    Result Date: 3/19/2022    [facility]  Vascular Lower Extremities DVT Study Procedure   Patient Name  CARMEN       Date of Study           03/18/2022                [address] [facility]   Date of Birth 1975  Gender                  Female   Age           55 year(s)  Race                    Black   Room Number   2008        Height:                 65.98 inch, 167.6 cm   Corporate ID  [id number]    Weight:                 380 pounds, 172.4 kg  #   Patient Acct  [de-identified]   BSA:        2.63 m^2    BMI:       61.36 kg/m^2  #   MR #          [id number]     Sonographer             Elda Garcia   Accession #   [id number]  Interpreting Physician  [address] [facility]   Referring                 Referring Physician     Margie John. Dina Benitez DO  Nurse  Practitioner  Procedure Type of Study:   Veins: Lower Extremities DVT Study, Venous Scan Lower Bilateral.  Indications for Study:Leg Swelling and Shortness of breath. Patient Status:ER. Technical Quality:Limited visualization. Limitation reason:bedside exam , body habitus, deep vessels, edema.   Conclusions   Summary   No evidence of superficial or deep venous thrombosis in both lower extremities. Signature   ----------------------------------------------------------------  Electronically signed by NIKKI Narvaez(Sonographer) on  03/18/2022 01:27 PM  ----------------------------------------------------------------   ----------------------------------------------------------------  Electronically signed by Roddy Na Reyes,Arthur(Interpreting  physician) on 03/19/2022 07:28 AM  ----------------------------------------------------------------  Findings:   Right Impression:                    Left Impression:  The common femoral, femoral,         The common femoral, femoral,  popliteal and tibial veins           popliteal and tibial veins  demonstrate normal compressibility   demonstrate normal compressibility  and augmentation. and augmentation. Normal compressibility of the great  Normal compressibility of the great  saphenous vein. saphenous vein. Normal compressibility of the small  Normal compressibility of the small  saphenous vein. saphenous vein. Limited visualization of the         Limited visualization of the  posterior tibial and peroneal veins. posterior tibial and peroneal veins. Risk Factors History +-------------------------------------------+----------+-------------------+ ! Diagnosis                                  ! Date      ! Comments           ! +-------------------------------------------+----------+-------------------+ ! Previous Scan                              !04/04/2008! WNL                ! +-------------------------------------------+----------+-------------------+ ! Chronic lung disease->COPD                 !          !                   ! +-------------------------------------------+----------+-------------------+ ! Previous Scan                              !09/22/2014! Bilateral WNL      ! +-------------------------------------------+----------+-------------------+ ! CHF !          !                   ! +-------------------------------------------+----------+-------------------+   - The patient's risk factor(s) include: chronic lung disease, dyslipidemia     and arterial hypertension.   - The patient has a former tobacco history. Allergies   - Allergy:Aspirin(Drug). - Allergy:Sulfa(Drug). Velocities are measured in cm/s ; Diameters are measured in cm Right Lower Extremities DVT Study Measurements Right 2D Measurements +------------------------------------+----------+---------------+----------+ ! Location                            ! Visualized! Compressibility! Thrombosis! +------------------------------------+----------+---------------+----------+ ! Common Femoral                      !Yes       ! Yes            ! None      ! +------------------------------------+----------+---------------+----------+ ! Prox Femoral                        !Yes       ! Yes            ! None      ! +------------------------------------+----------+---------------+----------+ ! Mid Femoral                         !Partial   !Yes            ! None      ! +------------------------------------+----------+---------------+----------+ ! Dist Femoral                        !Partial   !Yes            ! None      ! +------------------------------------+----------+---------------+----------+ ! Popliteal                           !Yes       ! Yes            ! None      ! +------------------------------------+----------+---------------+----------+ ! Sapheno Femoral Junction            ! Yes       ! Yes            ! None      ! +------------------------------------+----------+---------------+----------+ ! PTV                                 ! Partial   !Yes            ! None      ! +------------------------------------+----------+---------------+----------+ ! Peroneal                            !Partial   !Yes            ! None      ! +------------------------------------+----------+---------------+----------+ ! Gastroc !Yes       !Yes            ! None      ! +------------------------------------+----------+---------------+----------+ ! GSV Thigh                           ! Yes       ! Yes            ! None      ! +------------------------------------+----------+---------------+----------+ ! GSV Knee                            ! Yes       ! Yes            ! None      ! +------------------------------------+----------+---------------+----------+ ! GSV Ankle                           ! Yes       ! Yes            ! None      ! +------------------------------------+----------+---------------+----------+ ! SSV                                 ! Yes       ! Yes            ! None      ! +------------------------------------+----------+---------------+----------+ Right Doppler Measurements +--------------------------+---------+------+------------------------------+ ! Location                  ! Signal   !Reflux! Reflux (msec)                 ! +--------------------------+---------+------+------------------------------+ ! Common Femoral            !Pulsatile!      !                              ! +--------------------------+---------+------+------------------------------+ ! Prox Femoral              !Pulsatile!      !                              ! +--------------------------+---------+------+------------------------------+ ! Popliteal                 !Pulsatile!      !                              ! +--------------------------+---------+------+------------------------------+ Left Lower Extremities DVT Study Measurements Left 2D Measurements +------------------------------------+----------+---------------+----------+ ! Location                            ! Visualized! Compressibility! Thrombosis! +------------------------------------+----------+---------------+----------+ ! Common Femoral                      !Yes       ! Yes            ! None      ! +------------------------------------+----------+---------------+----------+ ! Prox Femoral !Yes       !Yes            ! None      ! +------------------------------------+----------+---------------+----------+ ! Mid Femoral                         !Partial   !Yes            ! None      ! +------------------------------------+----------+---------------+----------+ ! Dist Femoral                        !Partial   !Yes            ! None      ! +------------------------------------+----------+---------------+----------+ ! Popliteal                           !Yes       ! Yes            ! None      ! +------------------------------------+----------+---------------+----------+ ! Sapheno Femoral Junction            ! Yes       ! Yes            ! None      ! +------------------------------------+----------+---------------+----------+ ! PTV                                 ! Partial   !Yes            ! None      ! +------------------------------------+----------+---------------+----------+ ! Peroneal                            !Partial   !Yes            ! None      ! +------------------------------------+----------+---------------+----------+ ! Gastroc                             ! Yes       ! Yes            ! None      ! +------------------------------------+----------+---------------+----------+ ! GSV Thigh                           ! Yes       ! Yes            ! None      ! +------------------------------------+----------+---------------+----------+ ! GSV Knee                            ! Yes       ! Yes            ! None      ! +------------------------------------+----------+---------------+----------+ ! GSV Ankle                           ! Yes       ! Yes            ! None      ! +------------------------------------+----------+---------------+----------+ ! SSV                                 ! Yes       ! Yes            ! None      ! +------------------------------------+----------+---------------+----------+ Left Doppler Measurements +--------------------------+---------+------+------------------------------+ ! Location                  ! Signal !Reflux! Reflux (msec)                 ! +--------------------------+---------+------+------------------------------+ ! Common Femoral            !Pulsatile!      !                              ! +--------------------------+---------+------+------------------------------+ ! Prox Femoral              !Pulsatile!      !                              ! +--------------------------+---------+------+------------------------------+ ! Popliteal                 !Pulsatile!      !                              ! +--------------------------+---------+------+------------------------------+    FL MODIFIED BARIUM SWALLOW W VIDEO    Result Date: 3/19/2022  EXAMINATION: MODIFIED BARIUM SWALLOW WAS PERFORMED IN CONJUNCTION WITH SPEECH PATHOLOGY SERVICES TECHNIQUE: Fluoroscopic evaluation of the swallowing mechanism was performed using cineradiography with multiple consistency of barium product in conjunction with speech pathology services. FLUOROSCOPY DOSE AND TYPE OR TIME AND EXPOSURES: Fluoro time: 1.1 minute DAP: 22.570 mGy COMPARISON: None HISTORY: ORDERING SYSTEM PROVIDED HISTORY: h/o esophageal stricture per patient TECHNOLOGIST PROVIDED HISTORY: h/o esophageal stricture per patient 59-year-old female with history of esophageal stricture FINDINGS: No penetration or aspiration with the thin liquid and thick liquid substance by straw, pureed/pudding thick, soft solid and cookie solid substances. No penetration or aspiration with the above administered substances. Please see separate speech pathology report for full discussion of findings and recommendations.          ASSESSMENT & PLAN     ASSESSMENT / PLAN:     Principal Problem:    Acute respiratory failure with hypoxia and hypercapnia (HCC)  Active Problems:    Diarrhea    Asthma    HTN (hypertension)    Morbid obesity ([facility] [address]    Pulmonary hypertension, moderate to severe (HCC)    Morbid obesity with BMI of 50.0-59.9, adult (HCC)    Obesity hypoventilation syndrome (HCC)    STEPH (obstructive sleep apnea)    Chronic respiratory failure (HCC)    Right heart failure, unspecified (HCC)    Normocytic anemia    Hyperlipidemia    Cor pulmonale, chronic (HCC)  Resolved Problems:    * No resolved hospital problems. *      Acute on chronic hypoxic hypercapnic respiratory failure likely secondary to severe pulmonary hypertension, acute on chronic cor pulmonale with history of STEPH/OHS/morbid obesity, asthma/COPD, prior history of tracheostomy  -Continues to be on diuretics 40 IV twice daily, bronchodilators, pulmonology following  -Continues to be on high flow nasal cannula 50% FiO2 25 L, alternating with BiPAP  -Long-term plan to have trilogy ventilator, pending insurance/physician review approval.  -Patient rejected at [facility]. Will likely go to SNF. Concern for pneumonia-completed 7-day course of Levaquin this admission on 4/17    Essential HTN continue current regimen. Anemia of chronic disease continue iron, folic acid, J56 supplementation    Diabetes mellitus type 2 - Discontinue sliding scale as glycemia under control. Hypokalemia- monitor and replace    Diarrhea - resolved    DVT ppx: heparin  GI ppx: protonix   Diet: Regular    Patient has been seen by palliative care on 4/12/2022. Patient and family want everything to be done to the patient. CODE STATUS -full code. PT/OT/SW : On board. Discharge Planning:     assisting with transitional planning. Called  [phone] for peer to peer. Patient denied ,  Second appeal at [facility] also denied. Pursuing SNF. Betzy Bolanos MD  Internal Medicine Resident, PGY-1  [facility]; [place], [place]  4/23/2022, 9:19 AM    Attestation and add on       I have discussed the care of Niya Bauer , including pertinent history and exam findings,      4/23/22    with the resident. I have seen and examined the patient and the key elements of all parts of the encounter have been performed by me .    I agree with the assessment, plan and orders as documented by the resident. Principal Problem:    Acute respiratory failure with hypoxia and hypercapnia (HCC)  Active Problems:    Diarrhea    Asthma    HTN (hypertension)    Morbid obesity ([address]    Pulmonary hypertension, moderate to severe (HCC)    Morbid obesity with BMI of 50.0-59.9, adult (HCC)    Obesity hypoventilation syndrome (HCC)    STEPH (obstructive sleep apnea)    Chronic respiratory failure (HCC)    Right heart failure, unspecified (HCC)    Normocytic anemia    Hyperlipidemia    Cor pulmonale, chronic (HCC)  Resolved Problems:    * No resolved hospital problems. *        ''''''''''       MD KENNETH Campbell [address], [address] [place] [address].    Phone [phone]   Fax: [phone]  Answering Service: [phone]

## 2022-04-23 NOTE — PLAN OF CARE
Problem: Falls - Risk of:  Goal: Will remain free from falls  Description: Will remain free from falls  4/23/2022 0423 by Meg Estrada RN  Outcome: Progressing  4/22/2022 1813 by Magdalena Vernon RN  Outcome: Progressing  Goal: Absence of physical injury  Description: Absence of physical injury  4/23/2022 0423 by Meg Estrada RN  Outcome: Progressing  4/22/2022 1813 by Magdalena Vernon RN  Outcome: Progressing     Problem: Skin Integrity:  Goal: Will show no infection signs and symptoms  Description: Will show no infection signs and symptoms  4/23/2022 0423 by Meg Estrada RN  Outcome: Progressing  4/22/2022 1813 by Magdalena Vernon RN  Outcome: Progressing  Goal: Absence of new skin breakdown  Description: Absence of new skin breakdown  4/23/2022 0423 by Meg Estrada RN  Outcome: Progressing  4/22/2022 1813 by Magdalena Vernon RN  Outcome: Progressing     Problem: Nutrition  Goal: Optimal nutrition therapy  4/23/2022 0423 by Meg Estrada RN  Outcome: Progressing  4/22/2022 1813 by Magdalena Vernon RN  Outcome: Progressing     Problem: Pain:  Goal: Pain level will decrease  Description: Pain level will decrease  4/23/2022 0423 by Meg Estrada RN  Outcome: Progressing  4/22/2022 1813 by Magdalena Vernon RN  Outcome: Progressing  Goal: Control of acute pain  Description: Control of acute pain  4/23/2022 0423 by Meg Estrada RN  Outcome: Progressing  4/22/2022 1813 by Magdalena Vernon RN  Outcome: Progressing  Goal: Control of chronic pain  Description: Control of chronic pain  4/23/2022 0423 by Meg Estrada RN  Outcome: Progressing  4/22/2022 1813 by Magdalena Vernon RN  Outcome: Progressing     Problem: Discharge Planning:  Goal: Discharged to appropriate level of care  Description: Discharged to appropriate level of care  4/23/2022 0423 by Meg Estrada RN  Outcome: Progressing  4/22/2022 1813 by Magdalena Vernon RN  Outcome: Progressing     Problem:  Activity:  Goal: Capacity to carry out activities will improve  Description: Capacity to carry out activities will improve  4/23/2022 0423 by Saint Good, RN  Outcome: Progressing  4/22/2022 1813 by Jasmyn Glover RN  Outcome: Progressing  Goal: Will verbalize the importance of balancing activity with adequate rest periods  Description: Will verbalize the importance of balancing activity with adequate rest periods  4/23/2022 0423 by Saint Good, RN  Outcome: Progressing  4/22/2022 1813 by Jasmyn Glover RN  Outcome: Progressing     Problem: Cardiac:  Goal: Hemodynamic stability will improve  Description: Hemodynamic stability will improve  4/23/2022 0423 by Saint Good, RN  Outcome: Progressing  4/22/2022 1813 by Jasmyn Glover RN  Outcome: Progressing  Goal: Ability to maintain an adequate cardiac output will improve  Description: Ability to maintain an adequate cardiac output will improve  4/23/2022 0423 by Saint Good, RN  Outcome: Progressing  4/22/2022 1813 by Jasmyn Glover RN  Outcome: Progressing     Problem: Coping:  Goal: Verbalizations of decreased anxiety will decrease  Description: Verbalizations of decreased anxiety will decrease  4/23/2022 0423 by Saint Good, RN  Outcome: Progressing  4/22/2022 1813 by Jasmyn Glover RN  Outcome: Progressing     Problem: Fluid Volume:  Goal: Risk for excess fluid volume will decrease  Description: Risk for excess fluid volume will decrease  4/23/2022 0423 by Saint Good, RN  Outcome: Progressing  4/22/2022 1813 by Jasmyn Glover RN  Outcome: Progressing  Goal: Maintenance of adequate hydration will improve  Description: Maintenance of adequate hydration will improve  4/23/2022 0423 by Saint Good, RN  Outcome: Progressing  4/22/2022 1813 by Jasmyn Glover RN  Outcome: Progressing  Goal: Will show no signs and symptoms of electrolyte imbalance  Description: Will show no signs and symptoms of electrolyte imbalance  4/23/2022 0423 by Saint Good, RN  Outcome: Progressing  4/22/2022 1813 by Jasmyn Glover RN  Outcome: Progressing     Problem: Health Behavior:  Goal: Ability to manage health-related needs will improve  Description: Ability to manage health-related needs will improve  4/23/2022 0423 by Romel Gibson RN  Outcome: Progressing  4/22/2022 1813 by Aj Fisher RN  Outcome: Progressing  Goal: Ability to seek appropriate health care will improve  Description: Ability to seek appropriate health care will improve  4/23/2022 0423 by Romel Gibson RN  Outcome: Progressing  4/22/2022 1813 by Aj Fisher RN  Outcome: Progressing     Problem: Nutritional:  Goal: Maintenance of adequate nutrition will improve  Description: Maintenance of adequate nutrition will improve  4/23/2022 0423 by Romel Gibson RN  Outcome: Progressing  4/22/2022 1813 by Aj Fisher RN  Outcome: Progressing     Problem: Physical Regulation:  Goal: Complications related to the disease process, condition or treatment will be avoided or minimized  Description: Complications related to the disease process, condition or treatment will be avoided or minimized  4/23/2022 0423 by Romel Gibson RN  Outcome: Progressing  4/22/2022 1813 by Aj Fisher RN  Outcome: Progressing     Problem: Respiratory:  Goal: Ability to maintain adequate ventilation will improve  Description: Ability to maintain adequate ventilation will improve  4/23/2022 0423 by Romel Gibson RN  Outcome: Progressing  4/22/2022 1813 by Aj Fisher RN  Outcome: Progressing  Goal: Respiratory status will improve  Description: Respiratory status will improve  4/23/2022 0423 by Romel Gibson RN  Outcome: Progressing  4/22/2022 1813 by Aj Fisher RN  Outcome: Progressing     Problem: Discharge Planning  Goal: Discharge to home or other facility with appropriate resources  4/23/2022 0423 by Romel Gibson RN  Outcome: Progressing  4/22/2022 1813 by Aj Fisher RN  Outcome: Progressing     Problem: Chronic Conditions and Co-morbidities  Goal: Patient's chronic conditions and co-morbidity symptoms are monitored and maintained or improved  4/23/2022 0423 by Yumiko Hickman RN  Outcome: Progressing  4/22/2022 1813 by Rik Drake RN  Outcome: Progressing

## 2022-04-23 NOTE — PROGRESS NOTES
PULMONARY & CRITICAL CARE MEDICINE PROGRESS NOTE     Patient:  Orly Ochoa  MRN: [id number]  Admit date: 2022  Primary Care Physician: Herber Britton DO  Consulting Physician: Bradford Williamson MD  CODE Status: Full Code  LOS: 15     SUBJECTIVE     CHIEF COMPLAINT/REASON FOR INITIAL CONSULT: Acute on chronic respiratory failure    BRIEF HOSPITAL COURSE:  The patient is a 55 y.o. 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 Patient was discharged home recently on supplemental oxygen and insurance had not approved trilogy so she was sent home with her home CPAP. Patient was brought back to the hospital because she was having worsening shortness of breath. Unable to use her CPAP because of fatigue. On arrival EMS found that her saturation was in mid [de-identified]. She was placed on nonrebreather brought to the ER. Pulmonary has been consulted because of high flow requirement and hypoxia. Patient is laying in bed, she is on high flow oxygen 80%, using BiPAP at night. Her BNP was elevated. She is under going diuresis,   On Symbicort, Spiriva and albuterol. INTERVAL HISTORY:  22  Patient remains on high flow nasal cannula O2 Flow Rate (L/min)  Av L/min  Min: 25 L/min  Max: 25 L/min, FiO2 60%  No Acute events    REVIEW OF SYSTEMS:  Review of Systems   Constitutional: Positive for fatigue. Negative for fever. HENT: Negative for voice change. Eyes: Negative for discharge and visual disturbance. Respiratory: Positive for shortness of breath. Gastrointestinal: Negative for abdominal pain, diarrhea and vomiting. Genitourinary: Negative for dysuria and urgency. Musculoskeletal: Negative for joint swelling. Allergic/Immunologic: Negative for environmental allergies and immunocompromised state. Neurological: Positive for weakness. Hematological: Negative for adenopathy. Does not bruise/bleed easily. Psychiatric/Behavioral: Negative for behavioral problems. OBJECTIVE     PaO2/FiO2 RATIO:  No results for input(s): POCPO2 in the last 72 hours. FiO2 : 50 %     VITAL SIGNS:   LAST:  BP (!) 119/46   Pulse 91   Temp 98.6 °F (37 °C) (Oral)   Resp 23   Ht 5' 6\" (1.676 m)   Wt (!) 334 lb 3.5 oz (151.6 kg)   SpO2 (!) 84%   BMI 53.94 kg/m²   8-24 HR RANGE:  TEMP Temp  Av.6 °F (37 °C)  Min: 98.2 °F (36.8 °C)  Max: 98.8 °F (38.4 °C)   BP Systolic (94FVI), NFW:106 , Min:98 , VNS:883      Diastolic (49RTV), OMARI:17, Min:46, Max:64     PULSE Pulse  Av  Min: 91  Max: 127   RR Resp  Av.5  Min: 18  Max: 23   O2 SAT SpO2  Av %  Min: 84 %  Max: 98 %   OXYGEN DELIVERY O2 Flow Rate (L/min)  Av L/min  Min: 25 L/min  Max: 25 L/min         Physical Exam  Constitutional:       General: She is awake. Appearance: She is morbidly obese. She is ill-appearing. HENT:      Head: Normocephalic and atraumatic. Eyes:      General: No scleral icterus. Conjunctiva/sclera: Conjunctivae normal.      Pupils: Pupils are equal, round, and reactive to light. Cardiovascular:      Rate and Rhythm: Normal rate and regular rhythm. Heart sounds: No murmur heard. Pulmonary:      Effort: Prolonged expiration present. No accessory muscle usage or respiratory distress. Breath sounds: Decreased air movement present. Abdominal:      General: Bowel sounds are normal.      Palpations: Abdomen is soft. Tenderness: There is no abdominal tenderness. Musculoskeletal:      Cervical back: Neck supple. Neurological:      General: No focal deficit present. Mental Status: She is alert.        DATA REVIEW     Medications: Current Inpatient  Scheduled Meds:   potassium chloride  20 mEq Oral BID WC    lactobacillus  1 capsule Oral Daily with breakfast    furosemide  40 mg IntraVENous BID    lidocaine  1 patch TransDERmal Daily    sertraline  100 mg Oral Daily    albuterol  2.5 mg Nebulization 4x daily    amLODIPine  5 mg Oral Daily    atorvastatin  40 mg Oral Nightly    budesonide-formoterol  2 puff Inhalation BID    ferrous sulfate  325 mg Oral BID WC    folic acid  1 mg Oral Daily    isosorbide dinitrate  20 mg Oral TID    pantoprazole  40 mg Oral QAM AC    spironolactone  25 mg Oral Daily    tiotropium  2 puff Inhalation Daily    vitamin B-12  1,000 mcg Oral Daily    sodium chloride flush  10 mL IntraVENous 2 times per day    heparin (porcine)  5,000 Units SubCUTAneous 3 times per day    gabapentin  300 mg Oral TID     Continuous Infusions:   sodium chloride Stopped (04/09/22 0636)    dextrose         INPUT/OUTPUT:  In: 10 [I.V.:10]  Out: -        LABS:  ABGs:   No results for input(s): POCPH, POCPCO2, POCPO2, POCHCO3, VTNB5REQ in the last 72 hours. CBC:   No results for input(s): WBC, HGB, HCT, MCV, PLT, LABLYMP, MID, GRAN, LYMPHOPCT, MIDPERCENT, GRANULOCYTES, RBC, MCH, MCHC, RDW in the last 72 hours. Invalid input(s): RELATIVEPERCENT  CRP:   No results for input(s): CRP in the last 72 hours. LDH:   No results for input(s): LDH in the last 72 hours. BMP:   Recent Labs     04/21/22  0223 04/22/22  0540 04/23/22  0419    134* 133*   K 3.4* 3.3* 3.5*   CL 93* 93* 96*   CO2 34* 29 24   BUN 17 16 16   CREATININE 1.18* 1.02* 1.05*   GLUCOSE 105* 116* 124*     Liver Function Test:   No results for input(s): PROT, LABALBU, ALT, AST, GGT, ALKPHOS, BILITOT in the last 72 hours. Coagulation Profile:   No results for input(s): INR, PROTIME, APTT in the last 72 hours. D-Dimer:  No results for input(s): DDIMER in the last 72 hours. Lactic Acid:  No results for input(s): LACTA in the last 72 hours. Cardiac Enzymes:  No results for input(s): CKTOTAL, CKMB, CKMBINDEX, TROPONINI in the last 72 hours. Invalid input(s): TROPONIN, HSTROP  BNP/ProBNP:   No results for input(s): BNP, PROBNP in the last 72 hours.   Triglycerides:  No results for input(s): TRIG in the last 72 hours.     Microbiology:  Urine Culture:  No components found for: CURINE  Blood Culture:  No components found for: CBLOOD, CFUNGUSBL  Sputum Culture:  No components found for: CSPUTUM  No results for input(s): SPECDESC, SPECIAL, CULTURE, STATUS, ORG, CDIFFTOXPCR, CAMPYLOBPCR, SALMONELLAPC, SHIGAPCR, SHIGELLAPCR, MPNEUG, MPNEUM, LACTOQL in the last 72 hours. No results for input(s): SPUTUM, SPECDESC, SPECIAL, CULTURE, STATUS, ORG, CDIFFTOXPCR, MPNEUM, MPNEUG in the last 72 hours. Invalid input(s): Pascual Daniela, CFUNGUSBL     Pathology:    Radiology Reports:  XR CHEST PORTABLE   Final Result   *Stable cardiomegaly with mild central vascular congestion. *Interval increased right perihilar/infrahilar opacity which may represent   developing pneumonia. XR CHEST PORTABLE   Final Result   Limited negative portable chest radiograph. Echocardiogram:   Results for orders placed during the hospital encounter of 03/18/22    ECHO Complete 2D W Doppler W Color    Narrative    Summary  Left ventricle is normal in size. Global left ventricular systolic function is normal. Calculated ejection  fraction 60% by Heart Model. Marked Leftward compression of inter-ventricular septum (\"D-sign\")  indicating RV volume or pressure overload. Right ventricular function appears reduced. Moderately dilated right  ventricular cavity. Moderate to severe tricuspid regurgitation. Severe pulmonary hypertension. Estimated right ventricular systolic pressure  is 55RTTZ. Trivial pulmonic insufficiency.        ASSESSMENT AND PLAN     Assessment:    // Acute on chronic hypoxic/hypercapnic respiratory failure  // Chronic obstructive pulmonary disease  // Diastolic CHF  // Severe pulmonary hypertension  // Chronic cor pulmonale  // Morbid obesity  // Obstructive sleep apnea  // Obesity hypoventilation syndrome    Plan:    I personally interviewed/examined the patient; reviewed interval history, interpreted all available radiographic and laboratory data at the time of service.  Patient remains on high flow nasal cannula   Wean FiO2/flow rate as tolerated   Continue nocturnal and as needed BiPAP   Encourage incentive spirometry/Acapella   Continue pulmonary toilet, aspiration precautions and bronchodilators   Monitor I/O, electrolytes with a goal of even/negative fluid balance   Tolerating oral diet   Stress ulcer prophylaxis   Chemical DVT prophylaxis; heparin   Antimicrobials reviewed; completed 7-day course of Levaquin on 4/17   Completed prednisone taper   Physical/occupational therapy   Overall prognosis guarded   Awaits placement to LTAC    I would like to thank you for allowing me to participate in the care of this patient. Please feel free to call with any further questions or concerns. Alin Saavedra MD  Pulmonary and Critical Care Medicine           4/23/2022, 11:54 AM    Please note that this chart was generated using voice recognition Dragon dictation software. Although every effort was made to ensure the accuracy of this automated transcription, some errors in transcription may have occurred.

## 2022-04-23 NOTE — PLAN OF CARE
Problem: Falls - Risk of:  Goal: Will remain free from falls  Description: Will remain free from falls  4/23/2022 1442 by Ran Vergara RN  Outcome: Progressing  4/23/2022 0423 by Tiffanie Siddiqi RN  Outcome: Progressing  Goal: Absence of physical injury  Description: Absence of physical injury  4/23/2022 1442 by Ran Vergara RN  Outcome: Progressing  4/23/2022 0423 by Tiffanie Siddiqi RN  Outcome: Progressing     Problem: Skin Integrity:  Goal: Will show no infection signs and symptoms  Description: Will show no infection signs and symptoms  4/23/2022 1442 by Ran Vergara RN  Outcome: Progressing  4/23/2022 0423 by Tiffanie Siddiqi RN  Outcome: Progressing  Goal: Absence of new skin breakdown  Description: Absence of new skin breakdown  4/23/2022 1442 by Ran Vergara RN  Outcome: Progressing  4/23/2022 0423 by Tiffanie Siddiqi RN  Outcome: Progressing     Problem: Nutrition  Goal: Optimal nutrition therapy  4/23/2022 1442 by Ran Vergara RN  Outcome: Progressing  4/23/2022 0423 by Tiffanie Siddiqi RN  Outcome: Progressing     Problem: Pain:  Goal: Pain level will decrease  Description: Pain level will decrease  4/23/2022 1442 by Ran Vergara RN  Outcome: Progressing  4/23/2022 0423 by Tiffanie Siddiqi RN  Outcome: Progressing  Goal: Control of acute pain  Description: Control of acute pain  4/23/2022 1442 by Ran Vergara RN  Outcome: Progressing  4/23/2022 0423 by Tiffanie Siddiqi RN  Outcome: Progressing  Goal: Control of chronic pain  Description: Control of chronic pain  4/23/2022 1442 by Ran Vergara RN  Outcome: Progressing  4/23/2022 0423 by Tiffanie Siddiqi RN  Outcome: Progressing     Problem: Discharge Planning:  Goal: Discharged to appropriate level of care  Description: Discharged to appropriate level of care  4/23/2022 1442 by Ran Vergara RN  Outcome: Progressing  4/23/2022 0423 by Tiffanie Siddiqi RN  Outcome: Progressing     Problem:  Activity:  Goal: Capacity to carry out activities will improve  Description: Capacity to carry out activities will improve  4/23/2022 1442 by Hannah Melendrez RN  Outcome: Progressing  4/23/2022 0423 by Sky Olivera RN  Outcome: Progressing  Goal: Will verbalize the importance of balancing activity with adequate rest periods  Description: Will verbalize the importance of balancing activity with adequate rest periods  4/23/2022 1442 by Hannah Melendrez RN  Outcome: Progressing  4/23/2022 0423 by Sky Olivera RN  Outcome: Progressing     Problem: Cardiac:  Goal: Hemodynamic stability will improve  Description: Hemodynamic stability will improve  4/23/2022 1442 by Hannah Melendrez RN  Outcome: Progressing  4/23/2022 0423 by Sky Olivera RN  Outcome: Progressing  Goal: Ability to maintain an adequate cardiac output will improve  Description: Ability to maintain an adequate cardiac output will improve  4/23/2022 1442 by Hannah Melendrez RN  Outcome: Progressing  4/23/2022 0423 by Sky Olivera RN  Outcome: Progressing     Problem: Coping:  Goal: Verbalizations of decreased anxiety will decrease  Description: Verbalizations of decreased anxiety will decrease  4/23/2022 1442 by Hannah Melendrez RN  Outcome: Progressing  4/23/2022 0423 by Sky Olivera RN  Outcome: Progressing     Problem: Fluid Volume:  Goal: Risk for excess fluid volume will decrease  Description: Risk for excess fluid volume will decrease  4/23/2022 1442 by Hannah Melendrez RN  Outcome: Progressing  4/23/2022 0423 by Sky Olivera RN  Outcome: Progressing  Goal: Maintenance of adequate hydration will improve  Description: Maintenance of adequate hydration will improve  4/23/2022 1442 by Hannah Melendrez RN  Outcome: Progressing  4/23/2022 0423 by Sky Olivera RN  Outcome: Progressing  Goal: Will show no signs and symptoms of electrolyte imbalance  Description: Will show no signs and symptoms of electrolyte imbalance  4/23/2022 1442 by Hannah Melendrez RN  Outcome: Progressing  4/23/2022 0423 by Sky Olivera RN  Outcome: Progressing     Problem: Health Behavior:  Goal: Ability to manage health-related needs will improve  Description: Ability to manage health-related needs will improve  4/23/2022 1442 by Lola Moore RN  Outcome: Progressing  4/23/2022 0423 by Ginger Ng RN  Outcome: Progressing  Goal: Ability to seek appropriate health care will improve  Description: Ability to seek appropriate health care will improve  4/23/2022 1442 by Lola Moore RN  Outcome: Progressing  4/23/2022 0423 by Ginger Ng RN  Outcome: Progressing     Problem: Nutritional:  Goal: Maintenance of adequate nutrition will improve  Description: Maintenance of adequate nutrition will improve  4/23/2022 1442 by Lola Moore RN  Outcome: Progressing  4/23/2022 0423 by Ginger Ng RN  Outcome: Progressing     Problem: Physical Regulation:  Goal: Complications related to the disease process, condition or treatment will be avoided or minimized  Description: Complications related to the disease process, condition or treatment will be avoided or minimized  4/23/2022 1442 by Lola Moore RN  Outcome: Progressing  4/23/2022 0423 by Ginger Ng RN  Outcome: Progressing     Problem: Respiratory:  Goal: Ability to maintain adequate ventilation will improve  Description: Ability to maintain adequate ventilation will improve  4/23/2022 1442 by Lola Moore RN  Outcome: Progressing  4/23/2022 0423 by Ginger Ng RN  Outcome: Progressing  Goal: Respiratory status will improve  Description: Respiratory status will improve  4/23/2022 1442 by Lola Moore RN  Outcome: Progressing  4/23/2022 0423 by Ginger Ng RN  Outcome: Progressing     Problem: Discharge Planning  Goal: Discharge to home or other facility with appropriate resources  4/23/2022 1442 by Lola Moore RN  Outcome: Progressing  4/23/2022 0423 by Ginger Ng RN  Outcome: Progressing     Problem: Chronic Conditions and Co-morbidities  Goal: Patient's chronic conditions and co-morbidity symptoms are monitored and maintained or improved  4/23/2022 1442 by Lola Moore RN  Outcome: Progressing  4/23/2022 0423 by Ethan Byrd RN  Outcome: Progressing     Problem: Falls - Risk of:  Goal: Will remain free from falls  Description: Will remain free from falls  4/23/2022 1442 by Maria Luisa Flores RN  Outcome: Progressing  4/23/2022 0423 by Ethan Byrd RN  Outcome: Progressing     Problem: Falls - Risk of:  Goal: Absence of physical injury  Description: Absence of physical injury  4/23/2022 1442 by Maria Luisa Flores RN  Outcome: Progressing  4/23/2022 0423 by Ethan Byrd RN  Outcome: Progressing     Problem: Skin Integrity:  Goal: Will show no infection signs and symptoms  Description: Will show no infection signs and symptoms  4/23/2022 1442 by Maria Luisa Flores RN  Outcome: Progressing  4/23/2022 0423 by Ethan Byrd RN  Outcome: Progressing     Problem: Skin Integrity:  Goal: Absence of new skin breakdown  Description: Absence of new skin breakdown  4/23/2022 1442 by Maria Luisa Flores RN  Outcome: Progressing  4/23/2022 0423 by Ethan Byrd RN  Outcome: Progressing     Problem: Nutrition  Goal: Optimal nutrition therapy  4/23/2022 1442 by Maria Luisa Flores RN  Outcome: Progressing  4/23/2022 0423 by Ethan Byrd RN  Outcome: Progressing

## 2022-04-23 NOTE — PLAN OF CARE
Problem: RESPIRATORY  Intervention: Respiratory assessment  Note: BRONCHOSPASM/BRONCHOCONSTRICTION     [x]         IMPROVE AERATION/BREATH SOUNDS  [x]   ADMINISTER BRONCHODILATOR THERAPY AS APPROPRIATE  [x]   ASSESS BREATH SOUNDS  []   IMPLEMENT AEROSOL/MDI PROTOCOL  [x]   PATIENT EDUCATION AS NEEDED     PROVIDE ADEQUATE OXYGENATION WITH ACCEPTABLE SP02/ABG'S    [x]  IDENTIFY APPROPRIATE OXYGEN THERAPY  [x]   MONITOR SP02/ABG'S AS NEEDED   [x]   PATIENT EDUCATION AS NEEDED

## 2022-04-24 NOTE — PROGRESS NOTES
PULMONARY & CRITICAL CARE MEDICINE PROGRESS NOTE     Patient:  Shannan Liu  MRN: [id number]  Admit date: 2022  Primary Care Physician: Alexandru Hart DO  Consulting Physician: Johnson Galloway MD  CODE Status: Full Code  LOS: 16     SUBJECTIVE     CHIEF COMPLAINT/REASON FOR INITIAL CONSULT: Acute on chronic respiratory failure    BRIEF HOSPITAL COURSE:  The patient is a 55 y.o. 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 Patient was discharged home recently on supplemental oxygen and insurance had not approved trilogy so she was sent home with her home CPAP. Patient was brought back to the hospital because she was having worsening shortness of breath. Unable to use her CPAP because of fatigue. On arrival EMS found that her saturation was in mid [de-identified]. She was placed on nonrebreather brought to the ER. Pulmonary has been consulted because of high flow requirement and hypoxia. Patient is laying in bed, she is on high flow oxygen 80%, using BiPAP at night. Her BNP was elevated. She is under going diuresis,   On Symbicort, Spiriva and albuterol. INTERVAL HISTORY:  22  Patient remains on high flow nasal cannula O2 Flow Rate (L/min)  Av L/min  Min: 20 L/min  Max: 20 L/min, FiO2 60%  No Acute events    REVIEW OF SYSTEMS:  Review of Systems   Constitutional: Positive for fatigue. Negative for fever. HENT: Negative for voice change. Eyes: Negative for discharge and visual disturbance. Respiratory: Positive for shortness of breath. Gastrointestinal: Negative for abdominal pain, diarrhea and vomiting. Genitourinary: Negative for dysuria and urgency. Musculoskeletal: Negative for joint swelling. Allergic/Immunologic: Negative for environmental allergies and immunocompromised state. Neurological: Positive for weakness. Hematological: Negative for adenopathy. Does not bruise/bleed easily. Psychiatric/Behavioral: Negative for behavioral problems. OBJECTIVE     PaO2/FiO2 RATIO:  No results for input(s): POCPO2 in the last 72 hours. FiO2 : 50 %     VITAL SIGNS:   LAST:  BP (!) 90/49   Pulse 90   Temp 97.7 °F (36.5 °C) (Oral)   Resp 19   Ht 5' 6\" (1.676 m)   Wt (!) 334 lb 3.5 oz (151.6 kg)   SpO2 91%   BMI 53.94 kg/m²   8-24 HR RANGE:  TEMP Temp  Av.4 °F (36.9 °C)  Min: 97.7 °F (36.5 °C)  Max: 98.7 °F (30.8 °C)   BP Systolic (29MMJ), UG , Min:90 , GKX:812      Diastolic (77FGH), PDR:56, Min:49, Max:82     PULSE Pulse  Av.2  Min: 71  Max: 97   RR Resp  Avg: 15.3  Min: 11  Max: 19   O2 SAT SpO2  Av.2 %  Min: 90 %  Max: 96 %   OXYGEN DELIVERY O2 Flow Rate (L/min)  Av L/min  Min: 20 L/min  Max: 20 L/min         Physical Exam  Constitutional:       General: She is awake. Appearance: She is morbidly obese. She is ill-appearing. HENT:      Head: Normocephalic and atraumatic. Eyes:      General: No scleral icterus. Conjunctiva/sclera: Conjunctivae normal.      Pupils: Pupils are equal, round, and reactive to light. Cardiovascular:      Rate and Rhythm: Normal rate and regular rhythm. Heart sounds: No murmur heard. Pulmonary:      Effort: Prolonged expiration present. No accessory muscle usage or respiratory distress. Breath sounds: Decreased air movement present. Abdominal:      General: Bowel sounds are normal.      Palpations: Abdomen is soft. Tenderness: There is no abdominal tenderness. Musculoskeletal:      Cervical back: Neck supple. Neurological:      General: No focal deficit present. Mental Status: She is alert.        DATA REVIEW     Medications: Current Inpatient  Scheduled Meds:   potassium chloride  20 mEq Oral BID WC    lactobacillus  1 capsule Oral Daily with breakfast    furosemide  40 mg IntraVENous BID    lidocaine  1 patch TransDERmal Daily    sertraline  100 mg Oral Daily    albuterol  2.5 mg Nebulization 4x daily    amLODIPine  5 mg Oral Daily    atorvastatin  40 mg Oral Nightly    budesonide-formoterol  2 puff Inhalation BID    ferrous sulfate  325 mg Oral BID WC    folic acid  1 mg Oral Daily    isosorbide dinitrate  20 mg Oral TID    pantoprazole  40 mg Oral QAM AC    spironolactone  25 mg Oral Daily    tiotropium  2 puff Inhalation Daily    vitamin B-12  1,000 mcg Oral Daily    sodium chloride flush  10 mL IntraVENous 2 times per day    heparin (porcine)  5,000 Units SubCUTAneous 3 times per day    gabapentin  300 mg Oral TID     Continuous Infusions:   sodium chloride Stopped (04/09/22 0636)    dextrose         INPUT/OUTPUT:  In: 550 [P.O.:550]  Out: 1250 [Urine:1250]  Date 04/24/22 0000 - 04/24/22 2359   Shift 9860-8389 2741-1817 0980-5436 24 Hour Total   INTAKE   P.O.(mL/kg/hr)  550  550   Shift Total(mL/kg)  550(3.6)  550(3.6)   OUTPUT   Urine(mL/kg/hr)  600  600   Shift Total(mL/kg)  600(4)  600(4)   Weight (kg) 151.6 151.6 151.6 151.6        LABS:  ABGs:   No results for input(s): POCPH, POCPCO2, POCPO2, POCHCO3, FRRV8ENS in the last 72 hours. CBC:   No results for input(s): WBC, HGB, HCT, MCV, PLT, LABLYMP, MID, GRAN, LYMPHOPCT, MIDPERCENT, GRANULOCYTES, RBC, MCH, MCHC, RDW in the last 72 hours. Invalid input(s): RELATIVEPERCENT  CRP:   No results for input(s): CRP in the last 72 hours. LDH:   No results for input(s): LDH in the last 72 hours. BMP:   Recent Labs     04/22/22  0540 04/23/22  0419 04/24/22  0535   * 133* 136   K 3.3* 3.5* 4.5   CL 93* 96* 97*   CO2 29 24 24   BUN 16 16 16   CREATININE 1.02* 1.05* 0.94*   GLUCOSE 116* 124* 100*     Liver Function Test:   No results for input(s): PROT, LABALBU, ALT, AST, GGT, ALKPHOS, BILITOT in the last 72 hours. Coagulation Profile:   No results for input(s): INR, PROTIME, APTT in the last 72 hours. D-Dimer:  No results for input(s): DDIMER in the last 72 hours.   Lactic Acid:  No results for input(s): LACTA in the last 72 hours. Cardiac Enzymes:  No results for input(s): CKTOTAL, CKMB, CKMBINDEX, TROPONINI in the last 72 hours. Invalid input(s): TROPONIN, HSTROP  BNP/ProBNP:   No results for input(s): BNP, PROBNP in the last 72 hours. Triglycerides:  No results for input(s): TRIG in the last 72 hours. Microbiology:  Urine Culture:  No components found for: CURINE  Blood Culture:  No components found for: CBLOOD, CFUNGUSBL  Sputum Culture:  No components found for: CSPUTUM  No results for input(s): SPECDESC, SPECIAL, CULTURE, STATUS, ORG, CDIFFTOXPCR, CAMPYLOBPCR, SALMONELLAPC, SHIGAPCR, SHIGELLAPCR, MPNEUG, MPNEUM, LACTOQL in the last 72 hours. No results for input(s): SPUTUM, SPECDESC, SPECIAL, CULTURE, STATUS, ORG, CDIFFTOXPCR, MPNEUM, MPNEUG in the last 72 hours. Invalid input(s): Lynita Cintia, CFUNGUSBL     Pathology:    Radiology Reports:  XR CHEST PORTABLE   Final Result   *Stable cardiomegaly with mild central vascular congestion. *Interval increased right perihilar/infrahilar opacity which may represent   developing pneumonia. XR CHEST PORTABLE   Final Result   Limited negative portable chest radiograph. Echocardiogram:   Results for orders placed during the hospital encounter of 03/18/22    ECHO Complete 2D W Doppler W Color    Narrative    Summary  Left ventricle is normal in size. Global left ventricular systolic function is normal. Calculated ejection  fraction 60% by Heart Model. Marked Leftward compression of inter-ventricular septum (\"D-sign\")  indicating RV volume or pressure overload. Right ventricular function appears reduced. Moderately dilated right  ventricular cavity. Moderate to severe tricuspid regurgitation. Severe pulmonary hypertension. Estimated right ventricular systolic pressure  is 21BCUV. Trivial pulmonic insufficiency.        ASSESSMENT AND PLAN     Assessment:    // Acute on chronic hypoxic/hypercapnic respiratory failure  // Chronic obstructive pulmonary disease  // Diastolic CHF  // Severe pulmonary hypertension  // Chronic cor pulmonale  // Morbid obesity  // Obstructive sleep apnea  // Obesity hypoventilation syndrome    Plan:    I personally interviewed/examined the patient; reviewed interval history, interpreted all available radiographic and laboratory data at the time of service.  Patient remains on high flow nasal cannula   Wean FiO2/flow rate as tolerated   Continue nocturnal and as needed BiPAP   Encourage incentive spirometry/Acapella   Continue pulmonary toilet, aspiration precautions and bronchodilators   Monitor I/O, electrolytes with a goal of even/negative fluid balance   Tolerating oral diet   Stress ulcer prophylaxis   Chemical DVT prophylaxis; heparin   Antimicrobials reviewed; completed 7-day course of Levaquin on 4/17   Completed prednisone taper   Physical/occupational therapy   Overall prognosis guarded   Awaits placement to LTAC    I would like to thank you for allowing me to participate in the care of this patient. Please feel free to call with any further questions or concerns. Martha Ibanez MD  Pulmonary and Critical Care Medicine           4/24/2022, 1:38 PM    Please note that this chart was generated using voice recognition Dragon dictation software. Although every effort was made to ensure the accuracy of this automated transcription, some errors in transcription may have occurred.

## 2022-04-24 NOTE — PLAN OF CARE
Problem: Falls - Risk of:  Goal: Will remain free from falls  Description: Will remain free from falls  4/23/2022 2302 by Omid Davison RN  Outcome: Progressing  4/23/2022 1442 by Marc Camacho RN  Outcome: Progressing  Goal: Absence of physical injury  Description: Absence of physical injury  4/23/2022 2302 by Omid Davison RN  Outcome: Progressing  4/23/2022 1442 by Marc Camacho RN  Outcome: Progressing     Problem: Skin Integrity:  Goal: Will show no infection signs and symptoms  Description: Will show no infection signs and symptoms  4/23/2022 2302 by Omid Davison RN  Outcome: Progressing  4/23/2022 1442 by Marc Camacho RN  Outcome: Progressing  Goal: Absence of new skin breakdown  Description: Absence of new skin breakdown  4/23/2022 2302 by Omid Davison RN  Outcome: Progressing  4/23/2022 1442 by Marc Camacho RN  Outcome: Progressing     Problem: Nutrition  Goal: Optimal nutrition therapy  4/23/2022 1442 by Marc Camacho RN  Outcome: Progressing     Problem: Pain:  Goal: Pain level will decrease  Description: Pain level will decrease  4/23/2022 1442 by Marc Camacho RN  Outcome: Progressing  Goal: Control of acute pain  Description: Control of acute pain  4/23/2022 1442 by Marc Camacho RN  Outcome: Progressing  Goal: Control of chronic pain  Description: Control of chronic pain  4/23/2022 1442 by Marc Camacho RN  Outcome: Progressing     Problem: Discharge Planning:  Goal: Discharged to appropriate level of care  Description: Discharged to appropriate level of care  4/23/2022 1442 by Marc Camacho RN  Outcome: Progressing     Problem:  Activity:  Goal: Capacity to carry out activities will improve  Description: Capacity to carry out activities will improve  4/23/2022 1442 by Marc Camacho RN  Outcome: Progressing  Goal: Will verbalize the importance of balancing activity with adequate rest periods  Description: Will verbalize the importance of balancing activity with adequate rest periods  4/23/2022 1442 by Marc Camacho RN  Outcome: Progressing     Problem: Cardiac:  Goal: Hemodynamic stability will improve  Description: Hemodynamic stability will improve  4/23/2022 1442 by Davy Nunes RN  Outcome: Progressing  Goal: Ability to maintain an adequate cardiac output will improve  Description: Ability to maintain an adequate cardiac output will improve  4/23/2022 1442 by Davy Nunes RN  Outcome: Progressing     Problem: Coping:  Goal: Verbalizations of decreased anxiety will decrease  Description: Verbalizations of decreased anxiety will decrease  4/23/2022 1442 by Davy Nunes RN  Outcome: Progressing     Problem: Fluid Volume:  Goal: Risk for excess fluid volume will decrease  Description: Risk for excess fluid volume will decrease  4/23/2022 1442 by Davy Nunes RN  Outcome: Progressing  Goal: Maintenance of adequate hydration will improve  Description: Maintenance of adequate hydration will improve  4/23/2022 1442 by Davy Nunes RN  Outcome: Progressing  Goal: Will show no signs and symptoms of electrolyte imbalance  Description: Will show no signs and symptoms of electrolyte imbalance  4/23/2022 1442 by Davy Nunes RN  Outcome: Progressing     Problem: Health Behavior:  Goal: Ability to manage health-related needs will improve  Description: Ability to manage health-related needs will improve  4/23/2022 1442 by Davy Nunes RN  Outcome: Progressing  Goal: Ability to seek appropriate health care will improve  Description: Ability to seek appropriate health care will improve  4/23/2022 1442 by Davy Nunes RN  Outcome: Progressing     Problem: Nutritional:  Goal: Maintenance of adequate nutrition will improve  Description: Maintenance of adequate nutrition will improve  4/23/2022 1442 by Davy Nunes RN  Outcome: Progressing     Problem: Physical Regulation:  Goal: Complications related to the disease process, condition or treatment will be avoided or minimized  Description: Complications related to the disease process, condition or treatment will be avoided or minimized  4/23/2022 1442 by David Grewal RN  Outcome: Progressing     Problem: Respiratory:  Goal: Ability to maintain adequate ventilation will improve  Description: Ability to maintain adequate ventilation will improve  4/23/2022 1442 by David Grewal RN  Outcome: Progressing  Goal: Respiratory status will improve  Description: Respiratory status will improve  4/23/2022 1442 by David Grewal RN  Outcome: Progressing     Problem: Discharge Planning  Goal: Discharge to home or other facility with appropriate resources  4/23/2022 1442 by David Grewal RN  Outcome: Progressing     Problem: Chronic Conditions and Co-morbidities  Goal: Patient's chronic conditions and co-morbidity symptoms are monitored and maintained or improved  4/23/2022 1442 by David Grewal RN  Outcome: Progressing

## 2022-04-24 NOTE — PROGRESS NOTES
[facility]  Internal Medicine Teaching Residency Program  Inpatient Daily Progress Note  ______________________________________________________________________________    Patient: Maryellen Dunn  YOB: 1975   OPL:[id number]    Acct: [de-identified]     Room: 2004/2004-01  Admit date: 4/8/2022  Today's date: 04/24/22  Number of days in the hospital: 16    SUBJECTIVE   Admitting Diagnosis: Acute respiratory failure with hypoxia and hypercapnia (HCC)  CC: SOB  Pt examined at bedside. Chart & results reviewed. No acute events overnight. Patient is afebrile and hemodynamically stable. Patient is still continued on high flow nasal cannula with 50% FiO2, alternating with BiPAP. Patient denies nausea or vomiting. Has not had diarrhea for 3 days. Awaiting placement to SNF/[facility]   On diuresis with the Lasix 80 mg IV twice daily. ROS:  Constitutional:  negative for chills, fevers, sweats  Respiratory:  negative for cough, wheezing, hemoptysis  Cardiovascular: negative for chest pain , SOB, lower extremity edema. Gastrointestinal:, negative for abdominal pain, constipation,  nausea, vomiting  Neurological:  negative for dizziness, headache    BRIEF HISTORY     The patient is a pleasant 55 y.o. 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 Patient states she has had increased shortness of breath the past 2 nights, unable to wear her CPAP nightly with increased fatigue. Patient is poor historian, states she wears 7 L O2 at home, however EMS on arrival saw patient on 4 L and was saturating in the mid 80s. Patient was placed on nonrebreather by EMS and saturations improved to 98%.     Of note, patient had previous hospitalization earlier this month for COPD exacerbation complicated by pneumonia. At that time patient was treated with antibiotics and steroids.   Pulmonology was consulted and patient was approved for trilogy noninvasive ventilator which she has yet to receive. Echocardiogram at that time showed EF 60%, \"D\" sign indicating RV pressure overload, moderate to severe MR, severe pulmonary hypertension. Patient is aware of these findings.     On my evaluation, patient resting comfortably on 15 L nonrebreather mask acute distress. Patient is morbidly obese with BMI 57. Denies any cough, chest pain, leg pain/swelling. She does have skin irritation on her buttocks. States she has been compliant with all meds (inculding bronchodilators and bumex 2mg BID) except for her PO DM meds as those were held on previous admission. Afebrile, hemodynamic stable, tachycardic in the 110s at present. Patient received prednisone and lasix 40mg IV in ED as well as rocephin and vancomycin. CXR in ED - limited negative CXR d/t body habitus.     Significant labs include proBNP on admission 4192, was in 1000s on recent discharge. Leukocytosis of 17.0. Hemoglobin 9.7. Slight bump in creatinine from baseline- 1.21. glucose 238.       4/10/2022-episode of hypoxia, patient drank 3.5 L of fluid admitted with CHF exacerbation, getting a chest x-ray, IV diuretics to continue, labs de-escalate, prednisone discontinued. 2022 - Patient has drank 2.5 L in the last 24 hours. Admitted with CHF exacerbation, currently on IV Lasix 80 mg IV twice daily. Did receive this morning. We will get a chest x-ray to look for any worsening pulmonary edema. Mild edema bilateral lower extremities noted. Labs Descalated, prednisone discontinued    2022 - Overnight BG went up to 423 and then insulin Lantus was increased to 20 nightly.     2022 -Lasix changed from 80 mg to 40 mg IV twice daily       OBJECTIVE     Vital Signs:  BP (!) 101/58   Pulse 78   Temp 98.7 °F (37.1 °C) (Axillary)   Resp 19   Ht 5' 6\" (1.676 m)   Wt (!) 334 lb 3.5 oz (151.6 kg)   SpO2 95%   BMI 53.94 kg/m²     Temp (24hrs), Av.6 °F (37 °C), Min:98.4 °F (36.9 °C), Max:98.7 °F (37.1 °C)    In: -   Out: 650 [Urine:650]    Physical Exam:  Constitutional: This is a well developed, well nourished, Greater than 40 - Morbid Obesity / Extreme Obesity / Grade III 55y.o. year old female who is alert, oriented, cooperative and in no apparent distress. Head:normocephalic and atraumatic. On high flow nasal cannula  EENT:  PERRLA. No conjunctival injections. Septum was midline, mucosa was without erythema, exudates or cobblestoning. No thrush was noted. Neck: Supple without thyromegaly. No elevated JVP. Trachea was midline. Respiratory: Decreased air movement present bilaterally. No accessory muscle usage or respiratory distress. Prolonged expiration present. No wheezes, Rales or crackles. Cardiovascular: HR regular without murmur, clicks, gallops or rubs. Abdomen: Abdomen soft bowel sounds present. No organomegaly. Lymphatic: No lymphadenopathy. Musculoskeletal: Normal curvature of the spine. No gross muscle weakness. Extremities: Bilateral lower extremity edema improved. Skin:  Warm and dry. Good color, turgor and pigmentation. No lesions or scars.   No cyanosis or clubbing  Neurological/Psychiatric: The patient's general behavior, level of consciousness, thought content and emotional status is normal.        Medications:  Scheduled Medications:    potassium chloride  20 mEq Oral BID WC    lactobacillus  1 capsule Oral Daily with breakfast    furosemide  40 mg IntraVENous BID    lidocaine  1 patch TransDERmal Daily    sertraline  100 mg Oral Daily    albuterol  2.5 mg Nebulization 4x daily    amLODIPine  5 mg Oral Daily    atorvastatin  40 mg Oral Nightly    budesonide-formoterol  2 puff Inhalation BID    ferrous sulfate  325 mg Oral BID WC    folic acid  1 mg Oral Daily    isosorbide dinitrate  20 mg Oral TID    pantoprazole  40 mg Oral QAM AC    spironolactone  25 mg Oral Daily    tiotropium  2 puff Inhalation Daily  vitamin B-12  1,000 mcg Oral Daily    sodium chloride flush  10 mL IntraVENous 2 times per day    heparin (porcine)  5,000 Units SubCUTAneous 3 times per day    gabapentin  300 mg Oral TID     Continuous Infusions:    sodium chloride Stopped (04/09/22 0636)    dextrose       PRN Medicationsloperamide, 2 mg, 4x Daily PRN  calcium carbonate, 500 mg, TID PRN  oxyCODONE-acetaminophen, 1 tablet, Q6H PRN  acetaminophen, 650 mg, Q4H PRN  sodium chloride, 1 spray, PRN  albuterol sulfate HFA, 2 puff, Q6H PRN  diazePAM, 5 mg, Q12H PRN  sodium chloride flush, 10 mL, PRN  sodium chloride, , PRN  magnesium sulfate, 1,000 mg, PRN  ondansetron, 4 mg, Q8H PRN   Or  ondansetron, 4 mg, Q6H PRN  polyethylene glycol, 17 g, Daily PRN  glucose, 15 g, PRN  dextrose, 12.5 g, PRN  glucagon (rDNA), 1 mg, PRN  dextrose, 100 mL/hr, PRN        Diagnostic Labs:  CBC:   No results for input(s): WBC, RBC, HGB, HCT, MCV, RDW, PLT in the last 72 hours. BMP:   Recent Labs     04/22/22  0540 04/23/22  0419   * 133*   K 3.3* 3.5*   CL 93* 96*   CO2 29 24   BUN 16 16   CREATININE 1.02* 1.05*     BNP: No results for input(s): BNP in the last 72 hours. PT/INR: No results for input(s): PROTIME, INR in the last 72 hours. APTT: No results for input(s): APTT in the last 72 hours. CARDIAC ENZYMES: No results for input(s): CKMB, CKMBINDEX, TROPONINI in the last 72 hours. Invalid input(s): CKTOTAL;3  FASTING LIPID PANEL:  Lab Results   Component Value Date    CHOL 144 11/17/2018    HDL 42 10/07/2020    TRIG 68 11/17/2018     LIVER PROFILE:   No results for input(s): AST, ALT, ALB, BILIDIR, BILITOT, ALKPHOS in the last 72 hours. MICROBIOLOGY:   Lab Results   Component Value Date/Time    CULTURE NO GROWTH 5 DAYS 04/09/2022 02:22 AM       Imaging:    XR CHEST PORTABLE    Result Date: 4/8/2022  Limited negative portable chest radiograph.      ECHO Complete 2D W Doppler W Color    Result Date: 3/22/2022  Transthoracic Echocardiography Report (TTE)  Patient Name PRICE       Date of Study               03/22/2022               Azam Mckay   Date of      1975  Gender                      Female  Birth   Age          55 year(s)  Race                        Black   Room Number  2008        Height:                     65.98 inch, 167.6 cm   Corporate ID [id number]    Weight:                     380 pounds, 172.4 kg  #   Patient Acct [de-identified]   BSA:          2.63 m^2      BMI:      61.36  #                                                              kg/m^2   MR #         [id number]     Sonographer                 [place]   Accession #  [id number]  Interpreting Physician      [address] [place] [address]   Fellow                   Referring Nurse                           Practitioner   Interpreting             Referring Physician         Akira DONNELLY [facility]  Fellow  Additional Comments Technically difficult study. Type of Study   TTE procedure:2D Echocardiogram, M-Mode, Doppler, Color Doppler. Procedure Date Date: 03/22/2022 Start: 03:37 PM Study Location: [facility] Technical Quality: Adequate visualization Indications:Dyspnea/SOB. History / Tech. Comments: Echo done at patient bedside. Procedure explained to patient. HTN, COPD, PHTN, STEPH, HX NSTEMI Patient Status: Inpatient Height: 65.98 inches Weight: 380 pounds BSA: 2.63 m^2 BMI: 61.36 kg/m^2 Allergies   - Aspirin.   - Sulfa. CONCLUSIONS Summary Left ventricle is normal in size. Global left ventricular systolic function is normal. Calculated ejection fraction 60% by Heart Model. Marked Leftward compression of inter-ventricular septum (\"D-sign\") indicating RV volume or pressure overload. Right ventricular function appears reduced. Moderately dilated right ventricular cavity. Moderate to severe tricuspid regurgitation. Severe pulmonary hypertension. Estimated right ventricular systolic pressure is 72SFJX. Trivial pulmonic insufficiency.  Signature ----------------------------------------------------------------------------  Electronically signed by Juliet Castellanos(Sonographer) on 2022  04:14 PM ---------------------------------------------------------------------------- ----------------------------------------------------------------------------  Electronically signed by Don Ellington(Interpreting physician) on 2022  11:48 AM ---------------------------------------------------------------------------- FINDINGS Left Atrium Left atrium is normal in size. Left Ventricle Left ventricle is normal in size. Global left ventricular systolic function is normal. Calculated ejection fraction 60% by Heart Model. Marked Leftward compression of inter-ventricular septum (\"D-sign\") indicating RV volume or pressure overload. Right Atrium Right atrium is normal in size. Right Ventricle Right ventricular function appears reduced. Moderately dilated right ventricular cavity. Mitral Valve Normal mitral valve structure and function. No mitral regurgitation. Aortic Valve Aortic valve is trileaflet and opens adequately. No aortic insufficiency. Tricuspid Valve No obvious valvular abnormality. Moderate to severe tricuspid regurgitation. Severe pulmonary hypertension. Estimated right ventricular systolic pressure is 65YAVH. Pulmonic Valve The pulmonic valve is normal in structure. Trivial pulmonic insufficiency. Pericardial Effusion No pericardial effusion seen. Miscellaneous E/E' average = 7.1. IVC normal diameter & inspiratory collapse indicating normal RA filling pressure .  M-mode / 2D Measurements & Calculations:   LVIDd:3.4 cm(3.7 - 5.6 cm)       Diastolic XMIXPR:37.4 ml  XXVMQ:6.7 cm(2.2 - 4.0 cm)       Systolic UBFAFP:62.6 ml  MFKU:5.6 cm(0.6 - 1.1 cm)        Aortic Root:2.8 cm(2.0 - 3.7 cm)  LVPWd:1 cm(0.6 - 1.1 cm)         LA Dimension: 2.4 cm(1.9 - 4.0 cm)  Fractional Shortenin.24 %    LA volume/Index: 47.93 ml /18m^2  Calculated LVEF (%): 60.84 %     LVOT:1.7 cm                                   RVDd:4.91 cm   Mitral:                                 Aortic   Valve Area (P1/2-Time): 3.86 cm^2       Peak Velocity: 1.66 m/s  Peak E-Wave: 0.75 m/s                   Mean Velocity: 1.04 m/s  Peak A-Wave: 0.60 m/s                   Peak Gradient: 11.02 mmHg  E/A Ratio: 1.25                         Mean Gradient: 5 mmHg  Peak Gradient: 2.25 mmHg  Mean Gradient: 2 mmHg  Deceleration Time: 195 msec             Area (continuity): 1.37 cm^2  P1/2t: 57 msec                          AV VTI: 29.9 cm   Area (continuity): 1.96 cm^2  Mean Velocity: 0.75 m/s   Tricuspid:                              Pulmonic:   Peak TR Velocity: 4.34 m/s              Peak Velocity: 1.22 m/s  Peak TR Gradient: 75.3424 mmHg          Peak Gradient: 5.95 mmHg  Diastology / Tissue Doppler Septal Wall E' velocity:0.10 m/s Septal Wall E/E':7.7 Lateral Wall E' velocity:0.11 m/s Lateral Wall E/E':6.6    XR ABDOMEN (KUB) (SINGLE AP VIEW)    Result Date: 4/1/2022  EXAMINATION: ONE SUPINE XRAY VIEW(S) OF THE ABDOMEN 4/1/2022 12:45 pm COMPARISON: CT dated 05/08/2019. HISTORY: ORDERING SYSTEM PROVIDED HISTORY: Constipation, right lower abdominal  pain TECHNOLOGIST PROVIDED HISTORY: Constipation, right lower abdominal  pain Reason for Exam: supine FINDINGS: Nonspecific bowel gas pattern is seen with gaseous distension of the stomach. Mild-to-moderate amount of stool is noted in the colon. Evaluation of free air is limited on this supine view. There appears to be Trujillo catheter in place. Nonspecific bowel gas pattern with mild-to-moderate amount of stool noted in the colon. Gaseous distention of the stomach. XR CHEST PORTABLE    Result Date: 4/10/2022  EXAMINATION: ONE XRAY VIEW OF THE CHEST 4/10/2022 9:12 am COMPARISON: 04/08/2022 HISTORY: Reason for Exam: Acute hypoxia admitted with Pul edema FINDINGS: Stable cardiomegaly. Mild central vascular congestion.   Interval increased right perihilar/infrahilar opacity. No sizable effusion or discernible pneumothorax. Osseous structures grossly intact. *Stable cardiomegaly with mild central vascular congestion. *Interval increased right perihilar/infrahilar opacity which may represent developing pneumonia. XR CHEST PORTABLE    Result Date: 4/8/2022  EXAMINATION: ONE XRAY VIEW OF THE CHEST 4/8/2022 4:37 pm COMPARISON: 03/22/2022 HISTORY: ORDERING SYSTEM PROVIDED HISTORY: Shortness of breath, COPD and CHF, recent admission for pneumonia TECHNOLOGIST PROVIDED HISTORY: Shortness of breath, COPD and CHF, recent admission for pneumonia Reason for Exam: upr,sob, FINDINGS: Examination is limited by the patient's body habitus and by portable technique. Cardial pericardial silhouette is prominent but stable. There are low lung volumes is secondary bronchovascular crowding. No focal infiltrate is identified. No pneumothorax. No free air. No acute bony abnormality. Limited negative portable chest radiograph. XR CHEST PORTABLE    Result Date: 3/22/2022  EXAMINATION: ONE XRAY VIEW OF THE CHEST 3/22/2022 9:30 am COMPARISON: 03/19/2022 HISTORY: ORDERING SYSTEM PROVIDED HISTORY: improvement in pnuemonia TECHNOLOGIST PROVIDED HISTORY: improvement in pnuemonia Reason for Exam: ap uprt port chest FINDINGS: As compared to prior examination, there has been significant improvement in the right lower lobe infiltrate. Lungs appear clear. There is cardiomegaly noted. Bony structures unremarkable. Improvement in the the right basal infiltrate. XR CHEST PORTABLE    Result Date: 3/19/2022  EXAMINATION: ONE XRAY VIEW OF THE CHEST 3/19/2022 12:51 am COMPARISON: CT PA performed approximately 10 hours earlier. Portable chest obtained approximately 12 hours earlier.  HISTORY: ORDERING SYSTEM PROVIDED HISTORY: Increasing O2 requirment TECHNOLOGIST PROVIDED HISTORY: Increasing O2 requirment Reason for Exam: shortness of breath, chest pain off and on   upright port FINDINGS: Mild cardiomegaly and normal pulmonary vasculature. Right worse than left bibasilar patchy airspace opacities which appear worse than exam 12 hours earlier. No pneumothorax or pleural effusion. Surrounding structures are unremarkable. Findings suggestive of worsening bibasilar pneumonia. XR CHEST PORTABLE    Result Date: 3/18/2022  EXAMINATION: ONE XRAY VIEW OF THE CHEST 3/18/2022 11:16 am COMPARISON: Chest x-ray dated 29 June 2021 HISTORY: ORDERING SYSTEM PROVIDED HISTORY: sob TECHNOLOGIST PROVIDED HISTORY: sob FINDINGS: Mild stable cardiomegaly with pulmonary vascular congestion. No pneumothorax or pleural effusion. Stable cardiomegaly with mild pulmonary vascular congestion. CT CHEST PULMONARY EMBOLISM W CONTRAST    Result Date: 3/18/2022  EXAMINATION: CTA OF THE CHEST 3/18/2022 1:23 pm TECHNIQUE: CTA of the chest was performed after the administration of intravenous contrast.  Multiplanar reformatted images are provided for review. MIP images are provided for review. Dose modulation, iterative reconstruction, and/or weight based adjustment of the mA/kV was utilized to reduce the radiation dose to as low as reasonably achievable. COMPARISON: None HISTORY: ORDERING SYSTEM PROVIDED HISTORY: sedentary lifestyle, leg swelling, increased O2 TECHNOLOGIST PROVIDED HISTORY: sedentary lifestyle, leg swelling, increased O2 Decision Support Exception - unselect if not a suspected or confirmed emergency medical condition->Emergency Medical Condition (MA) Reason for Exam: sedentary lifestyle, leg swelling, increased O2 FINDINGS: Pulmonary Arteries: Pulmonary arteries are adequately opacified for evaluation. No evidence of intraluminal filling defect to suggest pulmonary embolism. Main pulmonary artery is normal in caliber. Mediastinum: No evidence of mediastinal lymphadenopathy. Small reactive lymph nodes seen in the mediastinum and hilar regions.   The heart and pericardium demonstrate no acute abnormality. There is no acute abnormality of the thoracic aorta. Lungs/pleura: Patchy ground-glass opacity and increased markings seen in the lower lung fields bilaterally concerning for bibasilar infiltrate. No pleural effusion or pneumothorax. No pulmonary mass or nodule. Patchy ground-glass opacity in the upper lung fields bilaterally. Upper Abdomen: Limited images of the upper abdomen are unremarkable. Soft Tissues/Bones: No acute bone or soft tissue abnormality. Degenerative changes seen within the spine with no chest wall abnormality. No evidence of pulmonary embolism. There is patchy ill-defined opacification lower lung fields bilaterally suggesting infiltrate with ground-glass opacity concerning for pneumonia as well in the upper lung fields. Reactive adenopathy throughout the mediastinum and hilar regions. RECOMMENDATIONS: Unavailable     VL DUP LOWER EXTREMITY VENOUS BILATERAL    Result Date: 3/19/2022    [facility]  Vascular Lower Extremities DVT Study Procedure   Patient Name  CARMEN       Date of Study           03/18/2022                [address]   Date of Birth 1975  Gender                  Female   Age           55 year(s)  Race                    Black   Room Number   2008        Height:                 65.98 inch, 167.6 cm   Corporate ID  [id number]    Weight:                 380 pounds, 172.4 kg  #   Patient Acct  [de-identified]   BSA:        2.63 m^2    BMI:       61.36 kg/m^2  #   MR #          [id number]     Sonographer             Derian Villa   Accession #   [id number]  Interpreting Physician  Lyubov [place]   Referring                 Referring Physician     Nalini Escobar. Jason Milligan DO  Nurse  Practitioner  Procedure Type of Study:   Veins: Lower Extremities DVT Study, Venous Scan Lower Bilateral.  Indications for Study:Leg Swelling and Shortness of breath. Patient Status:ER. Technical Quality:Limited visualization.  Limitation reason:bedside exam , body habitus, deep vessels, edema. Conclusions   Summary   No evidence of superficial or deep venous thrombosis in both lower  extremities. Signature   ----------------------------------------------------------------  Electronically signed by NIKKI Sena(Sonographer) on  03/18/2022 01:27 PM  ----------------------------------------------------------------   ----------------------------------------------------------------  Electronically signed by Alva Skeens Reyes,Arthur(Interpreting  physician) on 03/19/2022 07:28 AM  ----------------------------------------------------------------  Findings:   Right Impression:                    Left Impression:  The common femoral, femoral,         The common femoral, femoral,  popliteal and tibial veins           popliteal and tibial veins  demonstrate normal compressibility   demonstrate normal compressibility  and augmentation. and augmentation. Normal compressibility of the great  Normal compressibility of the great  saphenous vein. saphenous vein. Normal compressibility of the small  Normal compressibility of the small  saphenous vein. saphenous vein. Limited visualization of the         Limited visualization of the  posterior tibial and peroneal veins. posterior tibial and peroneal veins. Risk Factors History +-------------------------------------------+----------+-------------------+ ! Diagnosis                                  ! Date      ! Comments           ! +-------------------------------------------+----------+-------------------+ ! Previous Scan                              !04/04/2008! WNL                ! +-------------------------------------------+----------+-------------------+ ! Chronic lung disease->COPD                 !          !                   ! +-------------------------------------------+----------+-------------------+ ! Previous Scan                              !09/22/2014! Bilateral WNL      ! +-------------------------------------------+----------+-------------------+ ! CHF                                        ! !                   ! +-------------------------------------------+----------+-------------------+   - The patient's risk factor(s) include: chronic lung disease, dyslipidemia     and arterial hypertension.   - The patient has a former tobacco history. Allergies   - Allergy:Aspirin(Drug). - Allergy:Sulfa(Drug). Velocities are measured in cm/s ; Diameters are measured in cm Right Lower Extremities DVT Study Measurements Right 2D Measurements +------------------------------------+----------+---------------+----------+ ! Location                            ! Visualized! Compressibility! Thrombosis! +------------------------------------+----------+---------------+----------+ ! Common Femoral                      !Yes       ! Yes            ! None      ! +------------------------------------+----------+---------------+----------+ ! Prox Femoral                        !Yes       ! Yes            ! None      ! +------------------------------------+----------+---------------+----------+ ! Mid Femoral                         !Partial   !Yes            ! None      ! +------------------------------------+----------+---------------+----------+ ! Dist Femoral                        !Partial   !Yes            ! None      ! +------------------------------------+----------+---------------+----------+ ! Popliteal                           !Yes       ! Yes            ! None      ! +------------------------------------+----------+---------------+----------+ ! Sapheno Femoral Junction            ! Yes       ! Yes            ! None      ! +------------------------------------+----------+---------------+----------+ ! PTV                                 ! Partial   !Yes            ! None      ! +------------------------------------+----------+---------------+----------+ ! Peroneal                            !Partial   !Yes !None      ! +------------------------------------+----------+---------------+----------+ ! Gastroc                             ! Yes       ! Yes            ! None      ! +------------------------------------+----------+---------------+----------+ ! GSV Thigh                           ! Yes       ! Yes            ! None      ! +------------------------------------+----------+---------------+----------+ ! GSV Knee                            ! Yes       ! Yes            ! None      ! +------------------------------------+----------+---------------+----------+ ! GSV Ankle                           ! Yes       ! Yes            ! None      ! +------------------------------------+----------+---------------+----------+ ! SSV                                 ! Yes       ! Yes            ! None      ! +------------------------------------+----------+---------------+----------+ Right Doppler Measurements +--------------------------+---------+------+------------------------------+ ! Location                  ! Signal   !Reflux! Reflux (msec)                 ! +--------------------------+---------+------+------------------------------+ ! Common Femoral            !Pulsatile!      !                              ! +--------------------------+---------+------+------------------------------+ ! Prox Femoral              !Pulsatile!      !                              ! +--------------------------+---------+------+------------------------------+ ! Popliteal                 !Pulsatile!      !                              ! +--------------------------+---------+------+------------------------------+ Left Lower Extremities DVT Study Measurements Left 2D Measurements +------------------------------------+----------+---------------+----------+ ! Location                            ! Visualized! Compressibility! Thrombosis! +------------------------------------+----------+---------------+----------+ ! Common Femoral                      !Yes       ! Yes            ! None ! +------------------------------------+----------+---------------+----------+ ! Prox Femoral                        !Yes       ! Yes            ! None      ! +------------------------------------+----------+---------------+----------+ ! Mid Femoral                         !Partial   !Yes            ! None      ! +------------------------------------+----------+---------------+----------+ ! Dist Femoral                        !Partial   !Yes            ! None      ! +------------------------------------+----------+---------------+----------+ ! Popliteal                           !Yes       ! Yes            ! None      ! +------------------------------------+----------+---------------+----------+ ! Sapheno Femoral Junction            ! Yes       ! Yes            ! None      ! +------------------------------------+----------+---------------+----------+ ! PTV                                 ! Partial   !Yes            ! None      ! +------------------------------------+----------+---------------+----------+ ! Peroneal                            !Partial   !Yes            ! None      ! +------------------------------------+----------+---------------+----------+ ! Gastroc                             ! Yes       ! Yes            ! None      ! +------------------------------------+----------+---------------+----------+ ! GSV Thigh                           ! Yes       ! Yes            ! None      ! +------------------------------------+----------+---------------+----------+ ! GSV Knee                            ! Yes       ! Yes            ! None      ! +------------------------------------+----------+---------------+----------+ ! GSV Ankle                           ! Yes       ! Yes            ! None      ! +------------------------------------+----------+---------------+----------+ ! SSV                                 ! Yes       ! Yes            ! None      ! +------------------------------------+----------+---------------+----------+ Left Doppler Measurements +--------------------------+---------+------+------------------------------+ ! Location                  ! Signal   !Reflux! Reflux (msec)                 ! +--------------------------+---------+------+------------------------------+ ! Common Femoral            !Pulsatile!      !                              ! +--------------------------+---------+------+------------------------------+ ! Prox Femoral              !Pulsatile!      !                              ! +--------------------------+---------+------+------------------------------+ ! Popliteal                 !Pulsatile!      !                              ! +--------------------------+---------+------+------------------------------+    FL MODIFIED BARIUM SWALLOW W VIDEO    Result Date: 3/19/2022  EXAMINATION: MODIFIED BARIUM SWALLOW WAS PERFORMED IN CONJUNCTION WITH SPEECH PATHOLOGY SERVICES TECHNIQUE: Fluoroscopic evaluation of the swallowing mechanism was performed using cineradiography with multiple consistency of barium product in conjunction with speech pathology services. FLUOROSCOPY DOSE AND TYPE OR TIME AND EXPOSURES: Fluoro time: 1.1 minute DAP: 22.570 mGy COMPARISON: None HISTORY: ORDERING SYSTEM PROVIDED HISTORY: h/o esophageal stricture per patient TECHNOLOGIST PROVIDED HISTORY: h/o esophageal stricture per patient 80-year-old female with history of esophageal stricture FINDINGS: No penetration or aspiration with the thin liquid and thick liquid substance by straw, pureed/pudding thick, soft solid and cookie solid substances. No penetration or aspiration with the above administered substances. Please see separate speech pathology report for full discussion of findings and recommendations.          ASSESSMENT & PLAN     ASSESSMENT / PLAN:     Principal Problem:    Acute respiratory failure with hypoxia and hypercapnia (HCC)  Active Problems:    Diarrhea    Asthma    HTN (hypertension)    Morbid obesity ([address]    Pulmonary hypertension, moderate to severe (HCC)    Morbid obesity with BMI of 50.0-59.9, adult (HCC)    Obesity hypoventilation syndrome (HCC)    STEPH (obstructive sleep apnea)    Chronic respiratory failure (HCC)    Right heart failure, unspecified (HCC)    Normocytic anemia    Hyperlipidemia    Cor pulmonale, chronic (HCC)  Resolved Problems:    * No resolved hospital problems. *      Acute on chronic hypoxic hypercapnic respiratory failure likely secondary to severe pulmonary hypertension, acute on chronic cor pulmonale with history of STEPH/OHS/morbid obesity, asthma/COPD, prior history of tracheostomy  -Continues to be on diuretics 40 IV twice daily, bronchodilators, pulmonology following  -Continues to be on high flow nasal cannula 50% FiO2 25 L, alternating with BiPAP  -Long-term plan to have trilogy ventilator, pending insurance/physician review approval.  -Patient rejected at [facility]. Will likely go to SNF. Awaiting decrease in FiO2 and O2 requirement from high flow to nasal cannula. Pneumonia-completed 7-day course of Levaquin this admission on 4/17    Essential HTN continue amlodipine and spironolactone. Anemia of chronic disease continue iron, folic acid, Z34 supplementation    Diabetes mellitus type 2 - Discontinue sliding scale as glycemia under control. Hypokalemia-resolved. Diarrhea -resolved    Morbid obesity -counseled on lifestyle modifications and diet. DVT ppx: heparin  GI ppx: protonix   Diet: Regular    Patient has been seen by palliative care on 4/12/2022. Patient and family want everything to be done to the patient. CODE STATUS -full code. PT/OT/SW : On board. Discharge Planning:     assisting with transitional planning. Called  [phone] for peer to peer. Patient denied at [facility]. Second appeal at [facility] also denied. Pursuing SNF. Hudson Gordon MD  Internal Medicine Resident, PGY-1  [address], [place]  4/24/2022, 5:27 AM    Attestation and add on I have discussed the care of Hamlet Patricio , including pertinent history and exam findings,      4/24/22    with the resident. I have seen and examined the patient and the key elements of all parts of the encounter have been performed by me . I agree with the assessment, plan and orders as documented by the resident. Principal Problem:    Acute respiratory failure with hypoxia and hypercapnia (HCC)  Active Problems:    Diarrhea    Asthma    HTN (hypertension)    Morbid obesity ([facility] [address]    Pulmonary hypertension, moderate to severe (HCC)    Morbid obesity with BMI of 50.0-59.9, adult (HCC)    Obesity hypoventilation syndrome (HCC)    STEPH (obstructive sleep apnea)    Chronic respiratory failure (HCC)    Right heart failure, unspecified (HCC)    Normocytic anemia    Hyperlipidemia    Cor pulmonale, chronic (HCC)  Resolved Problems:    * No resolved hospital problems. *        ''''''''''       MD KENNETH TrivediSSM Health [facility]  [address] [facility], [address] [place] [address].    Phone [phone]   Fax: [phone]  Answering Service: [phone]

## 2022-04-25 NOTE — PROGRESS NOTES
[facility]  Internal Medicine Teaching Residency Program  Inpatient Daily Progress Note  ______________________________________________________________________________    Patient: Niya Bauer  YOB: 1975   QXL:[id number]    Acct: [de-identified]     Room: 2004/2004-01  Admit date: 4/8/2022  Today's date: 04/25/22  Number of days in the hospital: 17    SUBJECTIVE   Admitting Diagnosis: Acute respiratory failure with hypoxia and hypercapnia (HCC)  CC: SOB  Pt examined at bedside. Chart & results reviewed. No acute events overnight. Afebrile and hemodynamically stable. Patient remains on high flow nasal cannula although the FiO2 has decreased from 50 to 45% today. Patient denies nausea, vomiting or diarrhea today. Awaiting placement to SNF/LTAC   On diuresis with the Lasix 40 mg IV twice daily. ROS:  Constitutional:  negative for chills, fevers, sweats  Respiratory:  negative for cough, wheezing, hemoptysis  Cardiovascular: negative for chest pain , SOB, lower extremity edema. Gastrointestinal:, negative for abdominal pain, constipation,  nausea, vomiting  Neurological:  negative for dizziness, headache    BRIEF HISTORY     The patient is a pleasant 55 y.o. 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 Patient states she has had increased shortness of breath the past 2 nights, unable to wear her CPAP nightly with increased fatigue. Patient is poor historian, states she wears 7 L O2 at home, however EMS on arrival saw patient on 4 L and was saturating in the mid 80s. Patient was placed on nonrebreather by EMS and saturations improved to 98%.     Of note, patient had previous hospitalization earlier this month for COPD exacerbation complicated by pneumonia. At that time patient was treated with antibiotics and steroids.   Pulmonology was consulted and patient was approved for trilogy noninvasive ventilator which she has yet to receive. Echocardiogram at that time showed EF 60%, \"D\" sign indicating RV pressure overload, moderate to severe MR, severe pulmonary hypertension. Patient is aware of these findings.     On my evaluation, patient resting comfortably on 15 L nonrebreather mask acute distress. Patient is morbidly obese with BMI 57. Denies any cough, chest pain, leg pain/swelling. She does have skin irritation on her buttocks. States she has been compliant with all meds (inculding bronchodilators and bumex 2mg BID) except for her PO DM meds as those were held on previous admission. Afebrile, hemodynamic stable, tachycardic in the 110s at present. Patient received prednisone and lasix 40mg IV in ED as well as rocephin and vancomycin. CXR in ED - limited negative CXR d/t body habitus.     Significant labs include proBNP on admission 4192, was in 1000s on recent discharge. Leukocytosis of 17.0. Hemoglobin 9.7. Slight bump in creatinine from baseline- 1.21. glucose 238.       4/10/2022-episode of hypoxia, patient drank 3.5 L of fluid admitted with CHF exacerbation, getting a chest x-ray, IV diuretics to continue, labs de-escalate, prednisone discontinued. 2022 - Patient has drank 2.5 L in the last 24 hours. Admitted with CHF exacerbation, currently on IV Lasix 80 mg IV twice daily. Did receive this morning. We will get a chest x-ray to look for any worsening pulmonary edema. Mild edema bilateral lower extremities noted. Labs Descalated, prednisone discontinued    2022 - Overnight BG went up to 423 and then insulin Lantus was increased to 20 nightly.     2022 -Lasix changed from 80 mg to 40 mg IV twice daily       OBJECTIVE     Vital Signs:  /63   Pulse 76   Temp 98.2 °F (36.8 °C) (Axillary)   Resp 17   Ht 5' 6\" (1.676 m)   Wt (!) 334 lb 3.5 oz (151.6 kg)   SpO2 96%   BMI 53.94 kg/m²     Temp (24hrs), Av.1 °F (36.7 °C), Min:97.7 °F (36.5 °C), Max:98.3 °F (36.8 °C)    In: 1150   Out: 600 [Urine:600]    Physical Exam:  Constitutional: This is a well developed, well nourished, Greater than 40 - Morbid Obesity / Extreme Obesity / Grade III 55y.o. year old female who is alert, oriented, cooperative and in no apparent distress. Head:normocephalic and atraumatic. On high flow nasal cannula  EENT:  PERRLA. No conjunctival injections. Septum was midline, mucosa was without erythema, exudates or cobblestoning. No thrush was noted. Neck: Supple without thyromegaly. No elevated JVP. Trachea was midline. Respiratory: Decreased air movement present bilaterally. No accessory muscle usage or respiratory distress. Prolonged expiration present. No wheezes, Rales or crackles. Cardiovascular: HR regular without murmur, clicks, gallops or rubs. Abdomen: Abdomen soft bowel sounds present. No organomegaly. Lymphatic: No lymphadenopathy. Musculoskeletal: Normal curvature of the spine. No gross muscle weakness. Extremities: Bilateral lower extremity edema improved. Skin:  Warm and dry. Good color, turgor and pigmentation. No lesions or scars.   No cyanosis or clubbing  Neurological/Psychiatric: The patient's general behavior, level of consciousness, thought content and emotional status is normal.        Medications:  Scheduled Medications:    potassium chloride  20 mEq Oral BID WC    lactobacillus  1 capsule Oral Daily with breakfast    furosemide  40 mg IntraVENous BID    lidocaine  1 patch TransDERmal Daily    sertraline  100 mg Oral Daily    albuterol  2.5 mg Nebulization 4x daily    amLODIPine  5 mg Oral Daily    atorvastatin  40 mg Oral Nightly    budesonide-formoterol  2 puff Inhalation BID    ferrous sulfate  325 mg Oral BID WC    folic acid  1 mg Oral Daily    isosorbide dinitrate  20 mg Oral TID    pantoprazole  40 mg Oral QAM AC    spironolactone  25 mg Oral Daily    tiotropium  2 puff Inhalation Daily    vitamin B-12  1,000 mcg Oral Daily    sodium chloride flush  10 mL IntraVENous 2 times per day    heparin (porcine)  5,000 Units SubCUTAneous 3 times per day    gabapentin  300 mg Oral TID     Continuous Infusions:    sodium chloride Stopped (04/09/22 0636)    dextrose       PRN Medicationsloperamide, 2 mg, 4x Daily PRN  calcium carbonate, 500 mg, TID PRN  oxyCODONE-acetaminophen, 1 tablet, Q6H PRN  acetaminophen, 650 mg, Q4H PRN  sodium chloride, 1 spray, PRN  albuterol sulfate HFA, 2 puff, Q6H PRN  diazePAM, 5 mg, Q12H PRN  sodium chloride flush, 10 mL, PRN  sodium chloride, , PRN  magnesium sulfate, 1,000 mg, PRN  ondansetron, 4 mg, Q8H PRN   Or  ondansetron, 4 mg, Q6H PRN  polyethylene glycol, 17 g, Daily PRN  glucose, 15 g, PRN  dextrose, 12.5 g, PRN  glucagon (rDNA), 1 mg, PRN  dextrose, 100 mL/hr, PRN        Diagnostic Labs:  CBC:   No results for input(s): WBC, RBC, HGB, HCT, MCV, RDW, PLT in the last 72 hours. BMP:   Recent Labs     04/23/22  0419 04/24/22  0535 04/25/22  0516   * 136 136   K 3.5* 4.5 3.8   CL 96* 97* 98   CO2 24 24 28   BUN 16 16 15   CREATININE 1.05* 0.94* 0.87     BNP: No results for input(s): BNP in the last 72 hours. PT/INR: No results for input(s): PROTIME, INR in the last 72 hours. APTT: No results for input(s): APTT in the last 72 hours. CARDIAC ENZYMES: No results for input(s): CKMB, CKMBINDEX, TROPONINI in the last 72 hours. Invalid input(s): CKTOTAL;3  FASTING LIPID PANEL:  Lab Results   Component Value Date    CHOL 144 11/17/2018    HDL 42 10/07/2020    TRIG 68 11/17/2018     LIVER PROFILE:   No results for input(s): AST, ALT, ALB, BILIDIR, BILITOT, ALKPHOS in the last 72 hours. MICROBIOLOGY:   Lab Results   Component Value Date/Time    CULTURE NO GROWTH 5 DAYS 04/09/2022 02:22 AM       Imaging:    XR CHEST PORTABLE    Result Date: 4/8/2022  Limited negative portable chest radiograph.      ECHO Complete 2D W Doppler W Color    Result Date: 3/22/2022  Transthoracic Echocardiography Report (TTE)  Patient Name PRICE       Date of Study               03/22/2022               Yumi Kern   Date of      1975  Gender                      Female  Birth   Age          55 year(s)  Race                        Black   Room Number  2008        Height:                     65.98 inch, 167.6 cm   Corporate ID [id number]    Weight:                     380 pounds, 172.4 kg  #   Patient Acct [de-identified]   BSA:          2.63 m^2      BMI:      61.36  #                                                              kg/m^2   MR #         [id number]     Sonographer                 Willis Bowling   Accession #  [id number]  Interpreting Physician      [address] [place] [address]   Fellow                   Referring Nurse                           Practitioner   Interpreting             Referring Physician         [facility]  Fellow  Additional Comments Technically difficult study. Type of Study   TTE procedure:2D Echocardiogram, M-Mode, Doppler, Color Doppler. Procedure Date Date: 03/22/2022 Start: 03:37 PM Study Location: [facility] Technical Quality: Adequate visualization Indications:Dyspnea/SOB. History / Tech. Comments: Echo done at patient bedside. Procedure explained to patient. HTN, COPD, PHTN, STEPH, HX NSTEMI Patient Status: Inpatient Height: 65.98 inches Weight: 380 pounds BSA: 2.63 m^2 BMI: 61.36 kg/m^2 Allergies   - Aspirin.   - Sulfa. CONCLUSIONS Summary Left ventricle is normal in size. Global left ventricular systolic function is normal. Calculated ejection fraction 60% by Heart Model. Marked Leftward compression of inter-ventricular septum (\"D-sign\") indicating RV volume or pressure overload. Right ventricular function appears reduced. Moderately dilated right ventricular cavity. Moderate to severe tricuspid regurgitation. Severe pulmonary hypertension. Estimated right ventricular systolic pressure is 61XSNQ. Trivial pulmonic insufficiency.  Signature ----------------------------------------------------------------------------  Electronically signed by Juliet Hinkle(Sonographer) on 2022  04:14 PM ---------------------------------------------------------------------------- ----------------------------------------------------------------------------  Electronically signed by Don Ellington(Interpreting physician) on 2022  11:48 AM ---------------------------------------------------------------------------- FINDINGS Left Atrium Left atrium is normal in size. Left Ventricle Left ventricle is normal in size. Global left ventricular systolic function is normal. Calculated ejection fraction 60% by Heart Model. Marked Leftward compression of inter-ventricular septum (\"D-sign\") indicating RV volume or pressure overload. Right Atrium Right atrium is normal in size. Right Ventricle Right ventricular function appears reduced. Moderately dilated right ventricular cavity. Mitral Valve Normal mitral valve structure and function. No mitral regurgitation. Aortic Valve Aortic valve is trileaflet and opens adequately. No aortic insufficiency. Tricuspid Valve No obvious valvular abnormality. Moderate to severe tricuspid regurgitation. Severe pulmonary hypertension. Estimated right ventricular systolic pressure is 53GOHE. Pulmonic Valve The pulmonic valve is normal in structure. Trivial pulmonic insufficiency. Pericardial Effusion No pericardial effusion seen. Miscellaneous E/E' average = 7.1. IVC normal diameter & inspiratory collapse indicating normal RA filling pressure .  M-mode / 2D Measurements & Calculations:   LVIDd:3.4 cm(3.7 - 5.6 cm)       Diastolic MERDFP:79.8 ml  RPSOW:6.3 cm(2.2 - 4.0 cm)       Systolic OTEVOV:92.4 ml  NTJB:2.4 cm(0.6 - 1.1 cm)        Aortic Root:2.8 cm(2.0 - 3.7 cm)  LVPWd:1 cm(0.6 - 1.1 cm)         LA Dimension: 2.4 cm(1.9 - 4.0 cm)  Fractional Shortenin.24 %    LA volume/Index: 47.93 ml /18m^2  Calculated LVEF (%): 60.84 %     LVOT:1.7 cm                                   RVDd:4.91 cm   Mitral:                                 Aortic   Valve Area (P1/2-Time): 3.86 cm^2       Peak Velocity: 1.66 m/s  Peak E-Wave: 0.75 m/s                   Mean Velocity: 1.04 m/s  Peak A-Wave: 0.60 m/s                   Peak Gradient: 11.02 mmHg  E/A Ratio: 1.25                         Mean Gradient: 5 mmHg  Peak Gradient: 2.25 mmHg  Mean Gradient: 2 mmHg  Deceleration Time: 195 msec             Area (continuity): 1.37 cm^2  P1/2t: 57 msec                          AV VTI: 29.9 cm   Area (continuity): 1.96 cm^2  Mean Velocity: 0.75 m/s   Tricuspid:                              Pulmonic:   Peak TR Velocity: 4.34 m/s              Peak Velocity: 1.22 m/s  Peak TR Gradient: 75.3424 mmHg          Peak Gradient: 5.95 mmHg  Diastology / Tissue Doppler Septal Wall E' velocity:0.10 m/s Septal Wall E/E':7.7 Lateral Wall E' velocity:0.11 m/s Lateral Wall E/E':6.6    XR ABDOMEN (KUB) (SINGLE AP VIEW)    Result Date: 4/1/2022  EXAMINATION: ONE SUPINE XRAY VIEW(S) OF THE ABDOMEN 4/1/2022 12:45 pm COMPARISON: CT dated 05/08/2019. HISTORY: ORDERING SYSTEM PROVIDED HISTORY: Constipation, right lower abdominal  pain TECHNOLOGIST PROVIDED HISTORY: Constipation, right lower abdominal  pain Reason for Exam: supine FINDINGS: Nonspecific bowel gas pattern is seen with gaseous distension of the stomach. Mild-to-moderate amount of stool is noted in the colon. Evaluation of free air is limited on this supine view. There appears to be Trujillo catheter in place. Nonspecific bowel gas pattern with mild-to-moderate amount of stool noted in the colon. Gaseous distention of the stomach. XR CHEST PORTABLE    Result Date: 4/10/2022  EXAMINATION: ONE XRAY VIEW OF THE CHEST 4/10/2022 9:12 am COMPARISON: 04/08/2022 HISTORY: Reason for Exam: Acute hypoxia admitted with Pul edema FINDINGS: Stable cardiomegaly. Mild central vascular congestion.   Interval increased right perihilar/infrahilar opacity. No sizable effusion or discernible pneumothorax. Osseous structures grossly intact. *Stable cardiomegaly with mild central vascular congestion. *Interval increased right perihilar/infrahilar opacity which may represent developing pneumonia. XR CHEST PORTABLE    Result Date: 4/8/2022  EXAMINATION: ONE XRAY VIEW OF THE CHEST 4/8/2022 4:37 pm COMPARISON: 03/22/2022 HISTORY: ORDERING SYSTEM PROVIDED HISTORY: Shortness of breath, COPD and CHF, recent admission for pneumonia TECHNOLOGIST PROVIDED HISTORY: Shortness of breath, COPD and CHF, recent admission for pneumonia Reason for Exam: upr,sob, FINDINGS: Examination is limited by the patient's body habitus and by portable technique. Cardial pericardial silhouette is prominent but stable. There are low lung volumes is secondary bronchovascular crowding. No focal infiltrate is identified. No pneumothorax. No free air. No acute bony abnormality. Limited negative portable chest radiograph. XR CHEST PORTABLE    Result Date: 3/22/2022  EXAMINATION: ONE XRAY VIEW OF THE CHEST 3/22/2022 9:30 am COMPARISON: 03/19/2022 HISTORY: ORDERING SYSTEM PROVIDED HISTORY: improvement in pnuemonia TECHNOLOGIST PROVIDED HISTORY: improvement in pnuemonia Reason for Exam: ap uprt port chest FINDINGS: As compared to prior examination, there has been significant improvement in the right lower lobe infiltrate. Lungs appear clear. There is cardiomegaly noted. Bony structures unremarkable. Improvement in the the right basal infiltrate. XR CHEST PORTABLE    Result Date: 3/19/2022  EXAMINATION: ONE XRAY VIEW OF THE CHEST 3/19/2022 12:51 am COMPARISON: CT PA performed approximately 10 hours earlier. Portable chest obtained approximately 12 hours earlier.  HISTORY: ORDERING SYSTEM PROVIDED HISTORY: Increasing O2 requirment TECHNOLOGIST PROVIDED HISTORY: Increasing O2 requirment Reason for Exam: shortness of breath, chest pain off and on   upright port FINDINGS: Mild cardiomegaly and normal pulmonary vasculature. Right worse than left bibasilar patchy airspace opacities which appear worse than exam 12 hours earlier. No pneumothorax or pleural effusion. Surrounding structures are unremarkable. Findings suggestive of worsening bibasilar pneumonia. XR CHEST PORTABLE    Result Date: 3/18/2022  EXAMINATION: ONE XRAY VIEW OF THE CHEST 3/18/2022 11:16 am COMPARISON: Chest x-ray dated 29 June 2021 HISTORY: ORDERING SYSTEM PROVIDED HISTORY: sob TECHNOLOGIST PROVIDED HISTORY: sob FINDINGS: Mild stable cardiomegaly with pulmonary vascular congestion. No pneumothorax or pleural effusion. Stable cardiomegaly with mild pulmonary vascular congestion. CT CHEST PULMONARY EMBOLISM W CONTRAST    Result Date: 3/18/2022  EXAMINATION: CTA OF THE CHEST 3/18/2022 1:23 pm TECHNIQUE: CTA of the chest was performed after the administration of intravenous contrast.  Multiplanar reformatted images are provided for review. MIP images are provided for review. Dose modulation, iterative reconstruction, and/or weight based adjustment of the mA/kV was utilized to reduce the radiation dose to as low as reasonably achievable. COMPARISON: None HISTORY: ORDERING SYSTEM PROVIDED HISTORY: sedentary lifestyle, leg swelling, increased O2 TECHNOLOGIST PROVIDED HISTORY: sedentary lifestyle, leg swelling, increased O2 Decision Support Exception - unselect if not a suspected or confirmed emergency medical condition->Emergency Medical Condition (MA) Reason for Exam: sedentary lifestyle, leg swelling, increased O2 FINDINGS: Pulmonary Arteries: Pulmonary arteries are adequately opacified for evaluation. No evidence of intraluminal filling defect to suggest pulmonary embolism. Main pulmonary artery is normal in caliber. Mediastinum: No evidence of mediastinal lymphadenopathy. Small reactive lymph nodes seen in the mediastinum and hilar regions.   The heart and pericardium demonstrate no acute abnormality. There is no acute abnormality of the thoracic aorta. Lungs/pleura: Patchy ground-glass opacity and increased markings seen in the lower lung fields bilaterally concerning for bibasilar infiltrate. No pleural effusion or pneumothorax. No pulmonary mass or nodule. Patchy ground-glass opacity in the upper lung fields bilaterally. Upper Abdomen: Limited images of the upper abdomen are unremarkable. Soft Tissues/Bones: No acute bone or soft tissue abnormality. Degenerative changes seen within the spine with no chest wall abnormality. No evidence of pulmonary embolism. There is patchy ill-defined opacification lower lung fields bilaterally suggesting infiltrate with ground-glass opacity concerning for pneumonia as well in the upper lung fields. Reactive adenopathy throughout the mediastinum and hilar regions. RECOMMENDATIONS: Unavailable     VL DUP LOWER EXTREMITY VENOUS BILATERAL    Result Date: 3/19/2022    [facility]  Vascular Lower Extremities DVT Study Procedure   Patient Name  CARMEN       Date of Study           03/18/2022                [address]   Date of Birth 1975  Gender                  Female   Age           55 year(s)  Race                    Black   Room Number   2008        Height:                 65.98 inch, 167.6 cm   Corporate ID  [id number]    Weight:                 380 pounds, 172.4 kg  #   Patient Acct  [de-identified]   BSA:        2.63 m^2    BMI:       61.36 kg/m^2  #   MR #          [id number]     Sonographer             Harjit Torres   Accession #   [id number]  Interpreting Physician  Jared Fuentes   Referring                 Referring Physician     Farrah Castrejon. Daniel Grimaldo DO  Nurse  Practitioner  Procedure Type of Study:   Veins: Lower Extremities DVT Study, Venous Scan Lower Bilateral.  Indications for Study:Leg Swelling and Shortness of breath. Patient Status:ER. Technical Quality:Limited visualization.  Limitation reason:bedside exam , body habitus, deep vessels, edema. Conclusions   Summary   No evidence of superficial or deep venous thrombosis in both lower  extremities. Signature   ----------------------------------------------------------------  Electronically signed by Claudette Agreste, RVT Louisa(Sonographer) on  03/18/2022 01:27 PM  ----------------------------------------------------------------   ----------------------------------------------------------------  Electronically signed by Harvy Manners Reyes,Arthur(Interpreting  physician) on 03/19/2022 07:28 AM  ----------------------------------------------------------------  Findings:   Right Impression:                    Left Impression:  The common femoral, femoral,         The common femoral, femoral,  popliteal and tibial veins           popliteal and tibial veins  demonstrate normal compressibility   demonstrate normal compressibility  and augmentation. and augmentation. Normal compressibility of the great  Normal compressibility of the great  saphenous vein. saphenous vein. Normal compressibility of the small  Normal compressibility of the small  saphenous vein. saphenous vein. Limited visualization of the         Limited visualization of the  posterior tibial and peroneal veins. posterior tibial and peroneal veins. Risk Factors History +-------------------------------------------+----------+-------------------+ ! Diagnosis                                  ! Date      ! Comments           ! +-------------------------------------------+----------+-------------------+ ! Previous Scan                              !04/04/2008! WNL                ! +-------------------------------------------+----------+-------------------+ ! Chronic lung disease->COPD                 !          !                   ! +-------------------------------------------+----------+-------------------+ ! Previous Scan                              !09/22/2014! Bilateral WNL      ! +-------------------------------------------+----------+-------------------+ ! CHF                                        ! !                   ! +-------------------------------------------+----------+-------------------+   - The patient's risk factor(s) include: chronic lung disease, dyslipidemia     and arterial hypertension.   - The patient has a former tobacco history. Allergies   - Allergy:Aspirin(Drug). - Allergy:Sulfa(Drug). Velocities are measured in cm/s ; Diameters are measured in cm Right Lower Extremities DVT Study Measurements Right 2D Measurements +------------------------------------+----------+---------------+----------+ ! Location                            ! Visualized! Compressibility! Thrombosis! +------------------------------------+----------+---------------+----------+ ! Common Femoral                      !Yes       ! Yes            ! None      ! +------------------------------------+----------+---------------+----------+ ! Prox Femoral                        !Yes       ! Yes            ! None      ! +------------------------------------+----------+---------------+----------+ ! Mid Femoral                         !Partial   !Yes            ! None      ! +------------------------------------+----------+---------------+----------+ ! Dist Femoral                        !Partial   !Yes            ! None      ! +------------------------------------+----------+---------------+----------+ ! Popliteal                           !Yes       ! Yes            ! None      ! +------------------------------------+----------+---------------+----------+ ! Sapheno Femoral Junction            ! Yes       ! Yes            ! None      ! +------------------------------------+----------+---------------+----------+ ! PTV                                 ! Partial   !Yes            ! None      ! +------------------------------------+----------+---------------+----------+ ! Peroneal                            !Partial   !Yes !None      ! +------------------------------------+----------+---------------+----------+ ! Gastroc                             ! Yes       ! Yes            ! None      ! +------------------------------------+----------+---------------+----------+ ! GSV Thigh                           ! Yes       ! Yes            ! None      ! +------------------------------------+----------+---------------+----------+ ! GSV Knee                            ! Yes       ! Yes            ! None      ! +------------------------------------+----------+---------------+----------+ ! GSV Ankle                           ! Yes       ! Yes            ! None      ! +------------------------------------+----------+---------------+----------+ ! SSV                                 ! Yes       ! Yes            ! None      ! +------------------------------------+----------+---------------+----------+ Right Doppler Measurements +--------------------------+---------+------+------------------------------+ ! Location                  ! Signal   !Reflux! Reflux (msec)                 ! +--------------------------+---------+------+------------------------------+ ! Common Femoral            !Pulsatile!      !                              ! +--------------------------+---------+------+------------------------------+ ! Prox Femoral              !Pulsatile!      !                              ! +--------------------------+---------+------+------------------------------+ ! Popliteal                 !Pulsatile!      !                              ! +--------------------------+---------+------+------------------------------+ Left Lower Extremities DVT Study Measurements Left 2D Measurements +------------------------------------+----------+---------------+----------+ ! Location                            ! Visualized! Compressibility! Thrombosis! +------------------------------------+----------+---------------+----------+ ! Common Femoral                      !Yes       ! Yes            ! None ! +------------------------------------+----------+---------------+----------+ ! Prox Femoral                        !Yes       ! Yes            ! None      ! +------------------------------------+----------+---------------+----------+ ! Mid Femoral                         !Partial   !Yes            ! None      ! +------------------------------------+----------+---------------+----------+ ! Dist Femoral                        !Partial   !Yes            ! None      ! +------------------------------------+----------+---------------+----------+ ! Popliteal                           !Yes       ! Yes            ! None      ! +------------------------------------+----------+---------------+----------+ ! Sapheno Femoral Junction            ! Yes       ! Yes            ! None      ! +------------------------------------+----------+---------------+----------+ ! PTV                                 ! Partial   !Yes            ! None      ! +------------------------------------+----------+---------------+----------+ ! Peroneal                            !Partial   !Yes            ! None      ! +------------------------------------+----------+---------------+----------+ ! Gastroc                             ! Yes       ! Yes            ! None      ! +------------------------------------+----------+---------------+----------+ ! GSV Thigh                           ! Yes       ! Yes            ! None      ! +------------------------------------+----------+---------------+----------+ ! GSV Knee                            ! Yes       ! Yes            ! None      ! +------------------------------------+----------+---------------+----------+ ! GSV Ankle                           ! Yes       ! Yes            ! None      ! +------------------------------------+----------+---------------+----------+ ! SSV                                 ! Yes       ! Yes            ! None      ! +------------------------------------+----------+---------------+----------+ Left Doppler Measurements +--------------------------+---------+------+------------------------------+ ! Location                  ! Signal   !Reflux! Reflux (msec)                 ! +--------------------------+---------+------+------------------------------+ ! Common Femoral            !Pulsatile!      !                              ! +--------------------------+---------+------+------------------------------+ ! Prox Femoral              !Pulsatile!      !                              ! +--------------------------+---------+------+------------------------------+ ! Popliteal                 !Pulsatile!      !                              ! +--------------------------+---------+------+------------------------------+    FL MODIFIED BARIUM SWALLOW W VIDEO    Result Date: 3/19/2022  EXAMINATION: MODIFIED BARIUM SWALLOW WAS PERFORMED IN CONJUNCTION WITH SPEECH PATHOLOGY SERVICES TECHNIQUE: Fluoroscopic evaluation of the swallowing mechanism was performed using cineradiography with multiple consistency of barium product in conjunction with speech pathology services. FLUOROSCOPY DOSE AND TYPE OR TIME AND EXPOSURES: Fluoro time: 1.1 minute DAP: 22.570 mGy COMPARISON: None HISTORY: ORDERING SYSTEM PROVIDED HISTORY: h/o esophageal stricture per patient TECHNOLOGIST PROVIDED HISTORY: h/o esophageal stricture per patient 45-year-old female with history of esophageal stricture FINDINGS: No penetration or aspiration with the thin liquid and thick liquid substance by straw, pureed/pudding thick, soft solid and cookie solid substances. No penetration or aspiration with the above administered substances. Please see separate speech pathology report for full discussion of findings and recommendations.          ASSESSMENT & PLAN     ASSESSMENT / PLAN:     Principal Problem:    Acute respiratory failure with hypoxia and hypercapnia (HCC)  Active Problems:    Diarrhea    Asthma    HTN (hypertension)    Morbid obesity ([facility] [address]    Pulmonary hypertension, moderate to severe (HCC)    Morbid obesity with BMI of 50.0-59.9, adult (HCC)    Obesity hypoventilation syndrome (HCC)    STEPH (obstructive sleep apnea)    Chronic respiratory failure (HCC)    Right heart failure, unspecified (HCC)    Normocytic anemia    Hyperlipidemia    Cor pulmonale, chronic (HCC)  Resolved Problems:    * No resolved hospital problems. *      Acute on chronic hypoxic hypercapnic respiratory failure likely secondary to severe pulmonary hypertension, acute on chronic cor pulmonale with history of STEPH/OHS/morbid obesity, asthma/COPD, prior history of tracheostomy  -Patient states she is on home oxygen 6 L.  - Continues to be on diuretics 40 IV twice daily, bronchodilators, pulmonology following  -Continues to be on high flow nasal cannula 50 to 45 % FiO2 25 L, alternating with BiPAP  -Long-term plan to have trilogy ventilator, pending insurance/physician review approval.  -Patient rejected at [facility]. Will likely go to SNF. Awaiting decrease in FiO2 and O2 requirement from high flow to nasal cannula. Pneumonia-completed 7-day course of Levaquin this admission on 4/17    Essential HTN continue amlodipine and spironolactone. Anemia of chronic disease continue iron, folic acid, Z55 supplementation    Diabetes mellitus type 2 - Discontinue sliding scale as glycemia under control. Hypokalemia-resolved. Diarrhea -resolved    Morbid obesity -counseled on lifestyle modifications and diet. DVT ppx: heparin  GI ppx: protonix   Diet: Regular    Patient has been seen by palliative care on 4/12/2022. Patient and family want everything to be done to the patient. CODE STATUS -full code. PT/OT/SW : On board. Discharge Planning:     assisting with transitional planning. Called  [phone] for peer to peer. Patient denied at [facility]. Second appeal at [facility] also denied. Pursuing SNF. Robbie Hicks MD  Internal Medicine Resident, PGY-1  [address] [facility], [place]  4/25/2022, 8:14 AM    Attending Physician Statement  I have discussed the care of Rosio Velásquez and I have examined the patient myselft and taken ros and hpi , including pertinent history and exam findings,  with the resident. I have reviewed the key elements of all parts of the encounter with the resident. I agree with the assessment, plan and orders as documented by the resident.       Electronically signed by Reynold Rincon MD

## 2022-04-25 NOTE — PROGRESS NOTES
PULMONARY & CRITICAL CARE MEDICINE PROGRESS NOTE     Patient:  Orly Ochoa  MRN: [id number]  Admit date: 4/8/2022  Primary Care Physician: Herber Britton DO  Consulting Physician: Bradford Williamson MD  CODE Status: Full Code  LOS: 17     SUBJECTIVE     CHIEF COMPLAINT/REASON FOR INITIAL CONSULT: Acute on chronic respiratory failure    BRIEF HOSPITAL COURSE:  The patient is a 55 y.o. 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 Patient was discharged home recently on supplemental oxygen and insurance had not approved trilogy so she was sent home with her home CPAP. Patient was brought back to the hospital because she was having worsening shortness of breath. Unable to use her CPAP because of fatigue. On arrival EMS found that her saturation was in mid [de-identified]. She was placed on nonrebreather brought to the ER. Pulmonary has been consulted because of high flow requirement and hypoxia. Patient is laying in bed, she is on high flow oxygen 80%, using BiPAP at night. Her BNP was elevated. She is under going diuresis,   On Symbicort, Spiriva and albuterol. INTERVAL HISTORY:  04/25/22  Patient remains on high flow nasal cannula at 20 L and 50%  Afebrile  Hemodynamically stable, not requiring any pressors  No acute events overnight    REVIEW OF SYSTEMS:  Review of Systems   Constitutional: Positive for fatigue. Negative for fever. HENT: Negative for voice change. Eyes: Negative for discharge and visual disturbance. Respiratory: Positive for shortness of breath. Gastrointestinal: Negative for abdominal pain, diarrhea and vomiting. Genitourinary: Negative for dysuria and urgency. Musculoskeletal: Negative for joint swelling. Allergic/Immunologic: Negative for environmental allergies and immunocompromised state. Neurological: Positive for weakness. Hematological: Negative for adenopathy. Does not bruise/bleed easily. Psychiatric/Behavioral: Negative for behavioral problems. OBJECTIVE     PaO2/FiO2 RATIO:  No results for input(s): POCPO2 in the last 72 hours. FiO2 : 50 %     VITAL SIGNS:   LAST:  /63   Pulse 76   Temp 98.2 °F (36.8 °C) (Axillary)   Resp 17   Ht 5' 6\" (1.676 m)   Wt (!) 334 lb 3.5 oz (151.6 kg)   SpO2 96%   BMI 53.94 kg/m²   8-24 HR RANGE:  TEMP Temp  Av.1 °F (36.7 °C)  Min: 97.7 °F (36.5 °C)  Max: 98.3 °F (56.6 °C)   BP Systolic (72DMI), YZX:637 , Min:90 , NJQ:553      Diastolic (38CMO), ACB:26, Min:49, Max:63     PULSE Pulse  Av  Min: 76  Max: 98   RR Resp  Av.7  Min: 16  Max: 20   O2 SAT SpO2  Av.7 %  Min: 93 %  Max: 96 %   OXYGEN DELIVERY No data recorded         Physical Exam  Constitutional:       General: She is awake. Appearance: She is morbidly obese. She is ill-appearing. HENT:      Head: Normocephalic and atraumatic. Eyes:      General: No scleral icterus. Conjunctiva/sclera: Conjunctivae normal.      Pupils: Pupils are equal, round, and reactive to light. Cardiovascular:      Rate and Rhythm: Normal rate and regular rhythm. Heart sounds: No murmur heard. Pulmonary:      Effort: No accessory muscle usage, prolonged expiration or respiratory distress. Breath sounds: No decreased air movement. Abdominal:      General: Bowel sounds are normal.      Palpations: Abdomen is soft. Tenderness: There is no abdominal tenderness. Musculoskeletal:      Cervical back: Neck supple. Neurological:      General: No focal deficit present. Mental Status: She is alert.        DATA REVIEW     Medications: Current Inpatient  Scheduled Meds:   potassium chloride  20 mEq Oral BID WC    lactobacillus  1 capsule Oral Daily with breakfast    furosemide  40 mg IntraVENous BID    lidocaine  1 patch TransDERmal Daily    sertraline  100 mg Oral Daily    albuterol  2.5 mg Nebulization 4x daily    amLODIPine  5 mg Oral Daily    atorvastatin 40 mg Oral Nightly    budesonide-formoterol  2 puff Inhalation BID    ferrous sulfate  325 mg Oral BID WC    folic acid  1 mg Oral Daily    isosorbide dinitrate  20 mg Oral TID    pantoprazole  40 mg Oral QAM AC    spironolactone  25 mg Oral Daily    tiotropium  2 puff Inhalation Daily    vitamin B-12  1,000 mcg Oral Daily    sodium chloride flush  10 mL IntraVENous 2 times per day    heparin (porcine)  5,000 Units SubCUTAneous 3 times per day    gabapentin  300 mg Oral TID     Continuous Infusions:   sodium chloride Stopped (04/09/22 0636)    dextrose         INPUT/OUTPUT:  In: 1150 [P.O.:1150]  Out: 600 [Urine:600]       LABS:  ABGs:   No results for input(s): POCPH, POCPCO2, POCPO2, POCHCO3, ARGR2CHD in the last 72 hours. CBC:   No results for input(s): WBC, HGB, HCT, MCV, PLT, LABLYMP, MID, GRAN, LYMPHOPCT, MIDPERCENT, GRANULOCYTES, RBC, MCH, MCHC, RDW in the last 72 hours. Invalid input(s): RELATIVEPERCENT  CRP:   No results for input(s): CRP in the last 72 hours. LDH:   No results for input(s): LDH in the last 72 hours. BMP:   Recent Labs     04/23/22  0419 04/24/22  0535 04/25/22  0516   * 136 136   K 3.5* 4.5 3.8   CL 96* 97* 98   CO2 24 24 28   BUN 16 16 15   CREATININE 1.05* 0.94* 0.87   GLUCOSE 124* 100* 107*     Liver Function Test:   No results for input(s): PROT, LABALBU, ALT, AST, GGT, ALKPHOS, BILITOT in the last 72 hours. Coagulation Profile:   No results for input(s): INR, PROTIME, APTT in the last 72 hours. D-Dimer:  No results for input(s): DDIMER in the last 72 hours. Lactic Acid:  No results for input(s): LACTA in the last 72 hours. Cardiac Enzymes:  No results for input(s): CKTOTAL, CKMB, CKMBINDEX, TROPONINI in the last 72 hours. Invalid input(s): TROPONIN, HSTROP  BNP/ProBNP:   No results for input(s): BNP, PROBNP in the last 72 hours. Triglycerides:  No results for input(s): TRIG in the last 72 hours.      Microbiology:  Urine Culture:  No components found for: CURINE  Blood Culture:  No components found for: CBLOOD, CFUNGUSBL  Sputum Culture:  No components found for: CSPUTUM  No results for input(s): SPECDESC, SPECIAL, CULTURE, STATUS, ORG, CDIFFTOXPCR, CAMPYLOBPCR, SALMONELLAPC, SHIGAPCR, SHIGELLAPCR, MPNEUG, MPNEUM, LACTOQL in the last 72 hours. No results for input(s): SPUTUM, SPECDESC, SPECIAL, CULTURE, STATUS, ORG, CDIFFTOXPCR, MPNEUM, MPNEUG in the last 72 hours. Invalid input(s): Vladimir Ganja, CFUNGUSBL     Pathology:    Radiology Reports:  XR CHEST PORTABLE   Final Result   *Stable cardiomegaly with mild central vascular congestion. *Interval increased right perihilar/infrahilar opacity which may represent   developing pneumonia. XR CHEST PORTABLE   Final Result   Limited negative portable chest radiograph. Echocardiogram:   Results for orders placed during the hospital encounter of 03/18/22    ECHO Complete 2D W Doppler W Color    Narrative    Summary  Left ventricle is normal in size. Global left ventricular systolic function is normal. Calculated ejection  fraction 60% by Heart Model. Marked Leftward compression of inter-ventricular septum (\"D-sign\")  indicating RV volume or pressure overload. Right ventricular function appears reduced. Moderately dilated right  ventricular cavity. Moderate to severe tricuspid regurgitation. Severe pulmonary hypertension. Estimated right ventricular systolic pressure  is 85NHON. Trivial pulmonic insufficiency.        ASSESSMENT AND PLAN     Assessment:    // Acute on chronic hypoxic/hypercapnic respiratory failure  // Chronic obstructive pulmonary disease  // Diastolic CHF  // Severe pulmonary hypertension  // Chronic cor pulmonale  // Morbid obesity  // Obstructive sleep apnea  // Obesity hypoventilation syndrome    Plan:    I personally interviewed/examined the patient; reviewed interval history, interpreted all available radiographic and laboratory data at the time of service.  Patient remains on high flow nasal cannula   Wean FiO2/flow rate as tolerated   Continue nocturnal and as needed BiPAP   Encourage incentive spirometry/Acapella   Continue pulmonary toilet, aspiration precautions and bronchodilators   Monitor I/O, electrolytes with a goal of even/negative fluid balance   Tolerating oral diet   Stress ulcer prophylaxis-Protonix   Chemical DVT prophylaxis; heparin   Antimicrobials reviewed; completed 7-day course of Levaquin on 4/17   Completed prednisone taper   Physical/occupational therapy   Overall prognosis guarded   Awaits placement to LTAC    I would like to thank you for allowing me to participate in the care of this patient. Please feel free to call with any further questions or concerns. Nelida Reyes MD  Pulmonary and Critical Care Medicine           4/25/2022, 8:37 AM    Please note that this chart was generated using voice recognition Dragon dictation software. Although every effort was made to ensure the accuracy of this automated transcription, some errors in transcription may have occurred.

## 2022-04-25 NOTE — PROGRESS NOTES
Physical Therapy  Facility/Department: [facility]  Physical Therapy daily treatment note    Name: Cassi Powell  : 1975  MRN: [id number]  Date of Service: 2022    Discharge Recommendations:  Patient would benefit from continued therapy after discharge   PT Equipment Recommendations  Equipment Needed: No  Other: pt currenlty requires significant physical assistance for all aspects of mobility      Patient Diagnosis(es): The encounter diagnosis was Acute on chronic congestive heart failure, unspecified heart failure type ([address] Past Medical History:  has a past medical history of Acute on chronic diastolic CHF (congestive heart failure) ([address] HIEN (acute kidney injury) ([address] HIEN (acute kidney injury) ([address] Asthma, CHF, Chronic obstructive lung disease ([address] Chronic respiratory failure with hypoxia ([address] COPD, Diabetes mellitus, new onset ([address] Former smoker, HTN, Hyperglycemia, Hyperlipidemia, Hypertension, Morbid obesity with BMI of 60.0-69.9, adult ([address] Neuromuscular disorder ([address] STEPH on CPAP, Oxygen dependent, Pedal edema, Pneumonia, Pulmonary hypertension, moderate to severe ([address] Torn meniscus, and Wears glasses. Past Surgical History:  has a past surgical history that includes  section (); Abdomen surgery; joint replacement; Cystoscopy (2019); Cystoscopy (N/A, 2019); hc cath power picc triple (2018); pr office/outpt visit,procedure only (N/A, 2018); Tracheotomy (2018); Gastrostomy tube placement (2018); and tracheostomy closure (2018). Assessment   Body Structures, Functions, Activity Limitations Requiring Skilled Therapeutic Intervention: Decreased functional mobility ; Decreased balance;Decreased ROM; Decreased strength;Decreased endurance; Increased pain  Assessment: Able to sit up with one person assist.  Became distressed appearing after a total of 4' of gait along edge of bed. Tachycardic, saturation at 90% on high flow.   Patient needs further PT to regain functional independence. Requires PT Follow-Up: Yes  Activity Tolerance  Activity Tolerance: Patient limited by fatigue;Patient limited by endurance  Activity Tolerance Comments: on high flow O2     Plan   Plan  Plan: 5-7 times per week  Current Treatment Recommendations: Strengthening,ROM,Functional mobility training,Transfer training,Endurance training,Gait training,Home exercise program,Safety education & training,Patient/Caregiver education & training  Safety Devices  Type of Devices: Call light within reach,Left in bed,Patient at risk for falls,Gait belt  Restraints  Restraints Initially in Place: No  Restraints: 4 bed rails per pt request     Restrictions  Restrictions/Precautions  Restrictions/Precautions: Fall Risk,Up as Tolerated,General Precautions  Required Braces or Orthoses?: No  Position Activity Restriction  Other position/activity restrictions: up w/assist. Hi-yair O2 . Subjective   General  Chart Reviewed: Yes  Response To Previous Treatment: Patient with no complaints from previous session. Family / Caregiver Present: No  Follows Commands: Within Functional Limits  General Comment  Comments: Pt left in bed with call light within reach  Subjective  Subjective: Pt and RN agreeable to PT. Pt on high flow O2 upon arrival, pleasant and cooperative throughout.          Social/Functional History  Social/Functional History  Lives With: Son,Family (and grandchildren recently moved in with patient)  Type of Home: House  Home Layout: One level  Home Access: Ramped entrance  Bathroom Shower/Tub: Tub/Shower unit (unable to get into the bathroom within the home)  Bathroom Equipment: Commode  Home Equipment: Wheelchair-manual,Wheelchair-electric,Rolling walker,Oxygen,Hospital bed (7L NC at baseline)  Receives Help From: Home health (9-5 M-F, 12-8 Sat, 6-8 on Sun)  ADL Assistance: Needs assistance  Bath: Maximum assistance  Dressing: Maximum assistance  Grooming: Independent  Feeding: Independent  Toileting: Needs assistance  Homemaking Assistance: Needs assistance  Homemaking Responsibilities: No (home health aid completes)  Ambulation Assistance: Needs assistance (only walks to/from [facility])  Transfer Assistance: Independent  Active : No  Patient's  Info: son   Occupation: Unemployed  Leisure & Hobbies: Shopping and reading  Vision/Hearing  Hearing: Exceptions to [facility]  Hearing Exceptions: Hard of hearing/hearing concerns    Cognition   Orientation  Overall Orientation Status: Within Functional Limits     Objective      Observation/Palpation  Observation: Pt supine in bed  Gross Assessment  AROM: Generally decreased, functional  Strength: Generally decreased, functional  Coordination: Within functional limits  Tone: Normal  Sensation: Intact                    Bed mobility  Rolling to Left: Minimal assistance  Rolling to Right: Minimal assistance              Exercise Treatment: Pt instructed in and completed bilateral LE ROM AA/resisted supine exercises to include, SAQ's with bolster, striaght leg raise with VC to pace activity with good tolerance and return demonstration. A/AROM Exercises: Ankle pumps x15, heel slides x10 reps, SAQ x10 reps                                                        AM-PAC Score     AM-PAC Inpatient Mobility without Stair Climbing Raw Score : 10 (04/25/22 1616)  AM-PAC Inpatient without Stair Climbing T-Scale Score : 34.07 (04/25/22 1616)  Mobility Inpatient CMS 0-100% Score: 71.66 (04/25/22 1616)  Mobility Inpatient without Stair CMS G-Code Modifier : CL (04/25/22 1616)       Goals  Short Term Goals  Time Frame for Short term goals: 14  Short term goal 1: Pt to perform bed mobility [place]  Short term goal 2: Pt to attempt functional transfers [place]. STG2 met. Update STG 2: Transfer sit to/from stand with SBA.   Short term goal 3: Demonstrate standing balance of good - to decrease fall risk  Short term goal 4: Actively participate in 30 minutes of therapy to demo increased endurance  Short term goal 5: Pt to ambulate 10ft w/ RW [place]  Patient Goals   Patient goals :  To get well       Therapy Time   Individual Concurrent Group Co-treatment   Time In 1108         Time Out 1138         Minutes 30         Timed Code Treatment Minutes: 69 Monisha Ruano, PT

## 2022-04-25 NOTE — PROGRESS NOTES
Comprehensive Nutrition Assessment    Type and Reason for Visit:  Reassess    Nutrition Recommendations/Plan:   1. Continue current diet with mixed berry Ensure Clear Liquid ONS x 2 per day. Monitor/encourage PO intakes as tolerated. 2. Monitor labs, weights, and plan of care. Malnutrition Assessment:  Malnutrition Status: At risk for malnutrition (Comment) (04/20/22 1050)    Context:  Acute Illness     Findings of the 6 clinical characteristics of malnutrition:  Energy Intake:  Mild decrease in energy intake  Weight Loss:  Weight fluctuations due to fluids noted. Body Fat Loss:  No significant body fat loss   Muscle Mass Loss:  No significant muscle mass loss  Fluid Accumulation:  Mild (to Moderate) Extremities   Strength:  Not Performed    Nutrition Assessment:    Pt reports her vomiting and diarrhea have been improving. This morning pt ate more than 50% of her breakfast tray and was sipping on a berry Clear Liquid ONS. Using High-flow and Bipap as needed. Last recorded BM 4/21. Labs reviewed: Glucose  mg/dL. Meds include: Lasix, Folic Acid, Culturelle, Klor-Con. Nutrition Related Findings:    Labs/Meds reviewed. Last recorded BM 4/21. Wound Type: None (Redness, skin tear, excoriated areas present)       Current Nutrition Intake & Therapies:    Average Meal Intake: 51-75%,%  Average Supplements Intake: 51-75%,%  ADULT DIET; Regular; 4 carb choices (60 gm/meal); 1500 ml  ADULT ORAL NUTRITION SUPPLEMENT; Breakfast, Dinner; Clear Liquid Oral Supplement    Anthropometric Measures:  Height: 5' 6\" (167.6 cm)  Ideal Body Weight (IBW): 130 lbs (59 kg)    Admission Body Weight: 355 lb (161 kg)  Current Body Weight: 334 lb 3.5 oz (151.6 kg), 257.1 % IBW.  Weight Source: Bed Scale  Current BMI (kg/m2): 54  Usual Body Weight: 355 lb (161 kg)  % Weight Change (Calculated): 0                    BMI Categories: Obese Class 3 (BMI 40.0 or greater)    Estimated Daily Nutrient Needs:  Energy Requirements Based On: Formula  Weight Used for Energy Requirements: Current  Energy (kcal/day): 0610-6962 kcals/day  Weight Used for Protein Requirements: Ideal  Protein (g/day): 1.5-2.0 gm/kg =  gm pro/day  Method Used for Fluid Requirements: Other  Fluid (ml/day): 1500 mL/day per diet order/MD    Nutrition Diagnosis:   · Inadequate oral intake related to  (decreased appetite; current condition) as evidenced by intake 26-50%,intake 51-75% (need for ONS; variable PO intakes)    Nutrition Interventions:   Food and/or Nutrient Delivery: Continue Current Diet,Continue Oral Nutrition Supplement  Nutrition Education/Counseling: No recommendation at this time  Coordination of Nutrition Care: Continue to monitor while inpatient       Goals:  Previous Goal Met: Progressing toward Goal(s)  Goals: Meet at least 75% of estimated needs,by next RD assessment       Nutrition Monitoring and Evaluation:   Behavioral-Environmental Outcomes: None Identified  Food/Nutrient Intake Outcomes: Food and Nutrient Intake,Supplement Intake  Physical Signs/Symptoms Outcomes: Biochemical Data,GI Status,Nausea or Vomiting,Fluid Status or Edema,Hemodynamic Status,Nutrition Focused Physical Findings,Skin,Weight    Discharge Planning:    Continue current diet     Ayleen Ritchie, [address], LD  Contact: [phone]

## 2022-04-25 NOTE — CARE COORDINATION
Call placed to [facility] with Janie Davis of [place] @ [phone] and left vm message requesting call back with status update on SNF referral.

## 2022-04-25 NOTE — CARE COORDINATION
Received call from Suraj Tyler at [facility]. She is reviewing referral    [phone] spoke with Suraj Tyler. They are still reviewing. Updated her that pt is weaned to [address] spoke to Suraj Tyler.  They are still reviewing

## 2022-04-25 NOTE — PROGRESS NOTES
PALLIATIVE CARE NURSING ASSESSMENT    Patient: Jodie Toth  Room: 2004/2004-01    Reason For Consult   Goals of care evaluation  Distress management  Guidance and support  Facilitate communications  Assistance in coordinating care    Code Status: Full Code      Impression: Jodie Toth is a 55y.o. year old female  has a past medical history of Acute on chronic diastolic CHF (congestive heart failure) ([facility] [address] HIEN (acute kidney injury) ([facility] [address] HIEN (acute kidney injury) ([address] Asthma, CHF, Chronic obstructive lung disease ([address] Chronic respiratory failure with hypoxia ([facility] [address] COPD, Diabetes mellitus, new onset ([facility] [address] Former smoker, HTN, Hyperglycemia, Hyperlipidemia, Hypertension, Morbid obesity with BMI of 60.0-69.9, adult ([facility] [address] Neuromuscular disorder ([address] STEPH on CPAP, Oxygen dependent, Pedal edema, Pneumonia, Pulmonary hypertension, moderate to severe ([address] Torn meniscus, and Wears glasses. .  Currently hospitalized for the management of Acute Respiratory failure, hypoxia and hypercapnia. The Palliative Care Team is following to assist with patient support and goals of care. Vital Signs  Blood pressure (!) 109/59, pulse 90, temperature 98.2 °F (36.8 °C), temperature source Oral, resp. rate 16, height 5' 6\" (1.676 m), weight (!) 334 lb 3.5 oz (151.6 kg), SpO2 95 %, not currently breastfeeding.     Patient Active Problem List   Diagnosis    Asthma    Pneumonia    HTN (hypertension)    Torn meniscus    Chest pain    Pulmonary hypertension, moderate to severe ([address]    Morbid obesity with BMI of 50.0-59.9, adult ([address]    Obesity hypoventilation syndrome ([address]    H/O tracheostomy    STEPH (obstructive sleep apnea)    Right heart failure, unspecified (HCC)    Acute on chronic diastolic heart failure (HCC)    Acute on chronic congestive heart failure ([address]    Diabetes mellitus, new onset ([address]    Dizziness    Normocytic anemia    Pneumonia of both lower lobes due to infectious organism    NSTEMI (non-ST elevated myocardial infarction) ([address]    Acute pulmonary edema (HCC)    Hypertensive emergency    Acute respiratory failure with hypoxia and hypercapnia (HCC)    Smoker    Morbid obesity (HCC)    SOB (shortness of breath)    COPD exacerbation (HCC)    ARDS (adult respiratory distress syndrome) (HCC)    Fever    Disease due to rhinovirus    Encounter for PEG (percutaneous endoscopic gastrostomy) ([address]    Status post tracheostomy ([address]    Status post insertion of percutaneous endoscopic gastrostomy (PEG) tube ([address]    Rhinovirus infection    Acute non-recurrent pansinusitis    Chronic respiratory failure (HCC)    Chronic narcotic dependence ([address]    Chronically on benzodiazepine therapy    Neuropathy    Epigastric pain    Muscle spasm    Dyspnea    Prediabetes    Hyperlipidemia    Hypokalemia    Hypomagnesemia    CHF (congestive heart failure), NYHA class I, acute on chronic, combined (HCC)    Cor pulmonale, chronic (HCC)    Diarrhea       Palliative Interaction: Reviewed patient's course of care. Update received from bedside nurse Jana Bills. No acute changes or events. Patient is afebrile, hemodynamically stable High flow oxygen on 20 L/min nc with 45%FIO2. Pt's pulse 93%. Pt is alert and oriented when I met with her at her bedside. Sat and talked with her. Answered questions regarding palliative care. Cleared up patient's misunderstanding related to Palliative Care Services. Explained that services could be continued in the outpatient setting with either her coming to a [address] has at [facility] and [address]. Or she can receive visits from palliative care in her home or possibly SNF from agencies such as [facility] and [address]. Yvan Argueta is interested. Pt has had recent hospitalization and is now back. Pt has history of CHF, COPD, and Pulmonary Hypertension.       We talked about her history, she is interested in getting a specialty mattress for her hospital bed at home, and her motorized scooter is no longer usable to to weight. I encouraged her to speak with pcp and or pulmonologist to see if they will write scripts for these DME for her to have at home. We reviewed her [address]. She has her sister Denia listed in her paperwork that we have copy of in chart. Nigel Anguiano is happy with her sister being her [address] if needed. We also discussed code status. Confirmed patient is aware of full code as well as [facility] code [facility] Pt relates wanting to remain a full code. Encouraged patient to continue working with P.T. and O.T. Wished her well and let her know I will continue to follow with her. Goals/Plan of care. Education/support to staff  Education/support to patient  Discharge planning/helping to coordinate care  Communications with primary service  Providing support for coping/adaptation/distress of patient  Discussing meaning/purpose   Continue with current plan of care  Code status clarified: Full Code  Code status clarified: [facility]  Code status clarified: [facility]  Validating patient/family distress  Continued communication updates  Recognizing, reflecting, and empathizing with family members' anticipatory grief  Met 1 :1 with patient. Pt is talkative, relates she is breathing better. She is awaiting to be discharged to SNF. Pt believes that Trilogy breathing machine has been approved by her insurance this admission. We reviewed her wishes for [facility]. She has her sister listed as her [address] and is happy with that. She relates that she wants to be a full code at this point. I answered her questions regarding palliative care, explained that it is symptom management, support to patients with chronic diseases. We discussed and patient is interested in following with palliaitve care after discharge.         Palliative Care Coordinator  Nemo JACKSONN, RN, ONN-CG    [facility] Office: [address] [place] Office: [phone]  [facility] Office: [phone]    For Symptom Management Clinic scheduling please call [phone] Not applicable

## 2022-04-26 NOTE — PROGRESS NOTES
PULMONARY & CRITICAL CARE MEDICINE PROGRESS NOTE     Patient:  Fabricio Rivers  MRN: [id number]  Admit date: 4/8/2022  Primary Care Physician: Juan F Bradley DO  Consulting Physician: Adryan Fritz MD  CODE Status: Full Code  LOS: 18     SUBJECTIVE     CHIEF COMPLAINT/REASON FOR INITIAL CONSULT: Acute on chronic respiratory failure    BRIEF HOSPITAL COURSE:  The patient is a 55 y.o. 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 Patient was discharged home recently on supplemental oxygen and insurance had not approved trilogy so she was sent home with her home CPAP. Patient was brought back to the hospital because she was having worsening shortness of breath. Unable to use her CPAP because of fatigue. On arrival EMS found that her saturation was in mid [de-identified]. She was placed on nonrebreather brought to the ER. Pulmonary has been consulted because of high flow requirement and hypoxia. Patient is laying in bed, she is on high flow oxygen 80%, using BiPAP at night. Her BNP was elevated. She is under going diuresis,   On Symbicort, Spiriva and albuterol. INTERVAL HISTORY:  04/26/22  Patient's oxygen need has come down to 6 L  This is an improvement since yesterday  Afebrile  Hemodynamically stable, not requiring any pressors  No acute events overnight    REVIEW OF SYSTEMS:  Review of Systems   Constitutional: Negative for fatigue and fever. HENT: Negative for voice change. Eyes: Negative for discharge and visual disturbance. Respiratory: Negative for shortness of breath. Gastrointestinal: Negative for abdominal pain, diarrhea and vomiting. Genitourinary: Negative for dysuria and urgency. Musculoskeletal: Negative for joint swelling. Allergic/Immunologic: Negative for environmental allergies and immunocompromised state. Neurological: Negative for weakness. Hematological: Negative for adenopathy.  Does not bruise/bleed easily. Psychiatric/Behavioral: Negative for behavioral problems. OBJECTIVE     PaO2/FiO2 RATIO:  No results for input(s): POCPO2 in the last 72 hours. FiO2 : 50 %     VITAL SIGNS:   LAST:  /61   Pulse 75   Temp 97 °F (36.1 °C) (Axillary)   Resp 15   Ht 5' 6\" (1.676 m)   Wt (!) 334 lb 3.5 oz (151.6 kg)   SpO2 94%   BMI 53.94 kg/m²   8-24 HR RANGE:  TEMP Temp  Av °F (36.7 °C)  Min: 97 °F (36.1 °C)  Max: 98.6 °F (37 °C)   BP Systolic (21QEP), LN , Min:92 , MBO:224      Diastolic (74FWR), HPO:20, Min:53, Max:61     PULSE Pulse  Av.8  Min: 75  Max: 95   RR Resp  Av  Min: 13  Max: 20   O2 SAT SpO2  Av.7 %  Min: 94 %  Max: 99 %   OXYGEN DELIVERY No data recorded         Physical Exam  Constitutional:       General: She is awake. Appearance: She is morbidly obese. She is not ill-appearing. HENT:      Head: Normocephalic and atraumatic. Eyes:      General: No scleral icterus. Conjunctiva/sclera: Conjunctivae normal.      Pupils: Pupils are equal, round, and reactive to light. Cardiovascular:      Rate and Rhythm: Normal rate and regular rhythm. Heart sounds: No murmur heard. Pulmonary:      Effort: No accessory muscle usage, prolonged expiration or respiratory distress. Breath sounds: No decreased air movement. Abdominal:      General: Bowel sounds are normal.      Palpations: Abdomen is soft. Tenderness: There is no abdominal tenderness. Musculoskeletal:      Cervical back: Neck supple. Neurological:      General: No focal deficit present. Mental Status: She is alert.        DATA REVIEW     Medications: Current Inpatient  Scheduled Meds:   potassium chloride  20 mEq Oral BID WC    lactobacillus  1 capsule Oral Daily with breakfast    furosemide  40 mg IntraVENous BID    lidocaine  1 patch TransDERmal Daily    sertraline  100 mg Oral Daily    albuterol  2.5 mg Nebulization 4x daily    amLODIPine  5 mg Oral Daily    atorvastatin  40 mg Oral Nightly    budesonide-formoterol  2 puff Inhalation BID    ferrous sulfate  325 mg Oral BID WC    folic acid  1 mg Oral Daily    isosorbide dinitrate  20 mg Oral TID    pantoprazole  40 mg Oral QAM AC    spironolactone  25 mg Oral Daily    tiotropium  2 puff Inhalation Daily    vitamin B-12  1,000 mcg Oral Daily    sodium chloride flush  10 mL IntraVENous 2 times per day    heparin (porcine)  5,000 Units SubCUTAneous 3 times per day    gabapentin  300 mg Oral TID     Continuous Infusions:   sodium chloride Stopped (04/09/22 0636)    dextrose         INPUT/OUTPUT:  In: 1050 [P.O.:1050]  Out: 1350 [Urine:1350]  Date 04/26/22 0000 - 04/26/22 2359   Shift 4597-3216 0065-6804 7840-8852 24 Hour Total   INTAKE   Shift Total(mL/kg)       OUTPUT   Urine(mL/kg/hr) 650   650   Shift Total(mL/kg) 650(4.3)   650(4.3)   Weight (kg) 151.6 151.6 151.6 151.6        LABS:  ABGs:   No results for input(s): POCPH, POCPCO2, POCPO2, POCHCO3, [id number] in the last 72 hours. CBC:   No results for input(s): WBC, HGB, HCT, MCV, PLT, LABLYMP, MID, GRAN, LYMPHOPCT, MIDPERCENT, GRANULOCYTES, RBC, MCH, MCHC, RDW in the last 72 hours. Invalid input(s): RELATIVEPERCENT  CRP:   No results for input(s): CRP in the last 72 hours. LDH:   No results for input(s): LDH in the last 72 hours. BMP:   Recent Labs     04/24/22  0535 04/25/22  0516 04/26/22  0616    136 136   K 4.5 3.8 4.5   CL 97* 98 97*   CO2 24 28 26   BUN 16 15 17   CREATININE 0.94* 0.87 1.02*   GLUCOSE 100* 107* 123*     Liver Function Test:   No results for input(s): PROT, LABALBU, ALT, AST, GGT, ALKPHOS, BILITOT in the last 72 hours. Coagulation Profile:   No results for input(s): INR, PROTIME, APTT in the last 72 hours. D-Dimer:  No results for input(s): DDIMER in the last 72 hours. Lactic Acid:  No results for input(s): LACTA in the last 72 hours.   Cardiac Enzymes:  No results for input(s): CKTOTAL, CKMB, CKMBINDEX, TROPONINI in the last 72 hours. Invalid input(s): TROPONIN, HSTROP  BNP/ProBNP:   No results for input(s): BNP, PROBNP in the last 72 hours. Triglycerides:  No results for input(s): TRIG in the last 72 hours. Microbiology:  Urine Culture:  No components found for: CURINE  Blood Culture:  No components found for: CBLOOD, CFUNGUSBL  Sputum Culture:  No components found for: CSPUTUM  No results for input(s): SPECDESC, SPECIAL, CULTURE, STATUS, ORG, CDIFFTOXPCR, CAMPYLOBPCR, SALMONELLAPC, SHIGAPCR, SHIGELLAPCR, MPNEUG, MPNEUM, LACTOQL in the last 72 hours. No results for input(s): SPUTUM, SPECDESC, SPECIAL, CULTURE, STATUS, ORG, CDIFFTOXPCR, MPNEUM, MPNEUG in the last 72 hours. Invalid input(s): [place], CFUNGUSBL     Pathology:    Radiology Reports:  XR CHEST PORTABLE   Final Result   *Stable cardiomegaly with mild central vascular congestion. *Interval increased right perihilar/infrahilar opacity which may represent   developing pneumonia. XR CHEST PORTABLE   Final Result   Limited negative portable chest radiograph. Echocardiogram:   Results for orders placed during the hospital encounter of 03/18/22    ECHO Complete 2D W Doppler W Color    Narrative    Summary  Left ventricle is normal in size. Global left ventricular systolic function is normal. Calculated ejection  fraction 60% by Heart Model. Marked Leftward compression of inter-ventricular septum (\"D-sign\")  indicating RV volume or pressure overload. Right ventricular function appears reduced. Moderately dilated right  ventricular cavity. Moderate to severe tricuspid regurgitation. Severe pulmonary hypertension. Estimated right ventricular systolic pressure  is 90YRED. Trivial pulmonic insufficiency.        ASSESSMENT AND PLAN     Assessment:    // Acute on chronic hypoxic/hypercapnic respiratory failure  // Chronic obstructive pulmonary disease  // Diastolic CHF  // Severe pulmonary hypertension  // Chronic cor pulmonale  // Morbid obesity  // Obstructive sleep apnea  // Obesity hypoventilation syndrome  //Hyperglycemia, acceptable    Plan:    I personally interviewed/examined the patient; reviewed interval history, interpreted all available radiographic and laboratory data at the time of service.  Patient remains on nasal cannula   Wean FiO2/flow rate as tolerated   Continue nocturnal and as needed BiPAP   Encourage incentive spirometry/Acapella   Continue pulmonary toilet, aspiration precautions and bronchodilators   Monitor I/O, electrolytes with a goal of even/negative fluid balance   Tolerating oral diet   Stress ulcer prophylaxis-Protonix   Chemical DVT prophylaxis; heparin   Antimicrobials reviewed; completed 7-day course of Levaquin on 4/17   Completed prednisone taper   Physical/occupational therapy   Overall prognosis guarded   Awaits placement to LTAC    I would like to thank you for allowing me to participate in the care of this patient. Please feel free to call with any further questions or concerns. Odilia Bernard MD  Pulmonary and Critical Care Medicine           4/26/2022, 7:56 AM    Please note that this chart was generated using voice recognition Dragon dictation software. Although every effort was made to ensure the accuracy of this automated transcription, some errors in transcription may have occurred.

## 2022-04-26 NOTE — PROGRESS NOTES
[facility]  Internal Medicine Teaching Residency Program  Inpatient Daily Progress Note  ______________________________________________________________________________    Patient: Hodan Kaufman  YOB: 1975   XJT:[id number]    Acct: [de-identified]     Room: 2004/2004-01  Admit date: 4/8/2022  Today's date: 04/26/22  Number of days in the hospital: 18    SUBJECTIVE   Admitting Diagnosis: Acute respiratory failure with hypoxia and hypercapnia (HCC)  CC: SOB  Pt examined at bedside. Chart & results reviewed. No acute events overnight. Afebrile and hemodynamically stable. On BiPAP this am.  Her oxygen requirement weaned down from high flow to 6L nasal cannula. Patient denies nausea, vomiting or diarrhea today. Awaiting placement to SNF/LTAC   On diuresis with the Lasix 40 mg IV twice daily. ROS:  Constitutional:  negative for chills, fevers, sweats  Respiratory:  negative for cough, wheezing, hemoptysis  Cardiovascular: negative for chest pain , SOB, lower extremity edema. Gastrointestinal:, negative for abdominal pain, constipation,  nausea, vomiting  Neurological:  negative for dizziness, headache    BRIEF HISTORY     The patient is a pleasant 55 y.o. 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 Patient states she has had increased shortness of breath the past 2 nights, unable to wear her CPAP nightly with increased fatigue. Patient is poor historian, states she wears 7 L O2 at home, however EMS on arrival saw patient on 4 L and was saturating in the mid 80s. Patient was placed on nonrebreather by EMS and saturations improved to 98%.     Of note, patient had previous hospitalization earlier this month for COPD exacerbation complicated by pneumonia. At that time patient was treated with antibiotics and steroids.   Pulmonology was consulted and patient was approved for trilogy noninvasive ventilator which she has yet to receive. Echocardiogram at that time showed EF 60%, \"D\" sign indicating RV pressure overload, moderate to severe MR, severe pulmonary hypertension. Patient is aware of these findings.     On my evaluation, patient resting comfortably on 15 L nonrebreather mask acute distress. Patient is morbidly obese with BMI 57. Denies any cough, chest pain, leg pain/swelling. She does have skin irritation on her buttocks. States she has been compliant with all meds (inculding bronchodilators and bumex 2mg BID) except for her PO DM meds as those were held on previous admission. Afebrile, hemodynamic stable, tachycardic in the 110s at present. Patient received prednisone and lasix 40mg IV in ED as well as rocephin and vancomycin. CXR in ED - limited negative CXR d/t body habitus.     Significant labs include proBNP on admission 4192, was in 1000s on recent discharge. Leukocytosis of 17.0. Hemoglobin 9.7. Slight bump in creatinine from baseline- 1.21. glucose 238.       4/10/2022-episode of hypoxia, patient drank 3.5 L of fluid admitted with CHF exacerbation, getting a chest x-ray, IV diuretics to continue, labs de-escalate, prednisone discontinued. 2022 - Patient has drank 2.5 L in the last 24 hours. Admitted with CHF exacerbation, currently on IV Lasix 80 mg IV twice daily. Did receive this morning. We will get a chest x-ray to look for any worsening pulmonary edema. Mild edema bilateral lower extremities noted. Labs Descalated, prednisone discontinued    2022 - Overnight BG went up to 423 and then insulin Lantus was increased to 20 nightly.     2022 -Lasix changed from 80 mg to 40 mg IV twice daily       OBJECTIVE     Vital Signs:  /61   Pulse 75   Temp 97 °F (36.1 °C) (Axillary)   Resp 13   Ht 5' 6\" (1.676 m)   Wt (!) 334 lb 3.5 oz (151.6 kg)   SpO2 99%   BMI 53.94 kg/m²     Temp (24hrs), Av °F (36.7 °C), Min:97 °F (36.1 °C), Max:98.6 °F (37 °C)    In: 1050   Out: 1350 [Urine:1350]    Physical Exam:  Constitutional: This is a well developed, well nourished, Greater than 40 - Morbid Obesity / Extreme Obesity / Grade III 55y.o. year old female who is alert, oriented, cooperative and in no apparent distress. Head:normocephalic and atraumatic. On high flow nasal cannula  EENT:  PERRLA. No conjunctival injections. Septum was midline, mucosa was without erythema, exudates or cobblestoning. No thrush was noted. Neck: Supple without thyromegaly. No elevated JVP. Trachea was midline. Respiratory: Diminished breath sounds limited due to body habitus. No accessory muscle usage or respiratory distress. Prolonged expiration present. No wheezes, Rales or crackles. Cardiovascular: HR regular without murmur, clicks, gallops or rubs. Abdomen: Abdomen soft bowel sounds present. No organomegaly. Lymphatic: No lymphadenopathy. Musculoskeletal: Normal curvature of the spine. No gross muscle weakness. Extremities: Bilateral lower extremity edema improved. Skin:  Warm and dry. Good color, turgor and pigmentation. No lesions or scars.   No cyanosis or clubbing  Neurological/Psychiatric: The patient's general behavior, level of consciousness, thought content and emotional status is normal.        Medications:  Scheduled Medications:    potassium chloride  20 mEq Oral BID WC    lactobacillus  1 capsule Oral Daily with breakfast    furosemide  40 mg IntraVENous BID    lidocaine  1 patch TransDERmal Daily    sertraline  100 mg Oral Daily    albuterol  2.5 mg Nebulization 4x daily    amLODIPine  5 mg Oral Daily    atorvastatin  40 mg Oral Nightly    budesonide-formoterol  2 puff Inhalation BID    ferrous sulfate  325 mg Oral BID WC    folic acid  1 mg Oral Daily    isosorbide dinitrate  20 mg Oral TID    pantoprazole  40 mg Oral QAM AC    spironolactone  25 mg Oral Daily    tiotropium  2 puff Inhalation Daily    vitamin B-12  1,000 mcg Oral Daily    sodium chloride flush  10 mL IntraVENous 2 times per day    heparin (porcine)  5,000 Units SubCUTAneous 3 times per day    gabapentin  300 mg Oral TID     Continuous Infusions:    sodium chloride Stopped (04/09/22 0636)    dextrose       PRN Medicationsloperamide, 2 mg, 4x Daily PRN  calcium carbonate, 500 mg, TID PRN  oxyCODONE-acetaminophen, 1 tablet, Q6H PRN  acetaminophen, 650 mg, Q4H PRN  sodium chloride, 1 spray, PRN  albuterol sulfate HFA, 2 puff, Q6H PRN  diazePAM, 5 mg, Q12H PRN  sodium chloride flush, 10 mL, PRN  sodium chloride, , PRN  magnesium sulfate, 1,000 mg, PRN  ondansetron, 4 mg, Q8H PRN   Or  ondansetron, 4 mg, Q6H PRN  polyethylene glycol, 17 g, Daily PRN  glucose, 15 g, PRN  dextrose, 12.5 g, PRN  glucagon (rDNA), 1 mg, PRN  dextrose, 100 mL/hr, PRN        Diagnostic Labs:  CBC:   No results for input(s): WBC, RBC, HGB, HCT, MCV, RDW, PLT in the last 72 hours. BMP:   Recent Labs     04/24/22  0535 04/25/22  0516 04/26/22  0616    136 136   K 4.5 3.8 4.5   CL 97* 98 97*   CO2 24 28 26   BUN 16 15 17   CREATININE 0.94* 0.87 1.02*     BNP: No results for input(s): BNP in the last 72 hours. PT/INR: No results for input(s): PROTIME, INR in the last 72 hours. APTT: No results for input(s): APTT in the last 72 hours. CARDIAC ENZYMES: No results for input(s): CKMB, CKMBINDEX, TROPONINI in the last 72 hours. Invalid input(s): CKTOTAL;3  FASTING LIPID PANEL:  Lab Results   Component Value Date    CHOL 144 11/17/2018    HDL 42 10/07/2020    TRIG 68 11/17/2018     LIVER PROFILE:   No results for input(s): AST, ALT, ALB, BILIDIR, BILITOT, ALKPHOS in the last 72 hours. MICROBIOLOGY:   Lab Results   Component Value Date/Time    CULTURE NO GROWTH 5 DAYS 04/09/2022 02:22 AM       Imaging:    XR CHEST PORTABLE    Result Date: 4/8/2022  Limited negative portable chest radiograph.      ECHO Complete 2D W Doppler W Color    Result Date: 3/22/2022  Transthoracic Echocardiography Report (TTE)  Patient Name PRICE       Date of Study               03/22/2022               Mike Elliott   Date of      1975  Gender                      Female  Birth   Age          55 year(s)  Race                        Black   Room Number  2008        Height:                     65.98 inch, 167.6 cm   Corporate ID [id number]    Weight:                     380 pounds, 172.4 kg  #   Patient Acct [de-identified]   BSA:          2.63 m^2      BMI:      61.36  #                                                              kg/m^2   MR #         [id number]     Sonographer                 Shirley Gaviota   Accession #  [id number]  Interpreting Physician      [facility]   Fellow                   Referring Nurse                           Practitioner   Interpreting             Referring Physician         Akira Mann  Fellow  Additional Comments Technically difficult study. Type of Study   TTE procedure:2D Echocardiogram, M-Mode, Doppler, Color Doppler. Procedure Date Date: 03/22/2022 Start: 03:37 PM Study Location: [facility] Technical Quality: Adequate visualization Indications:Dyspnea/SOB. History / Tech. Comments: Echo done at patient bedside. Procedure explained to patient. HTN, COPD, PHTN, STEPH, HX NSTEMI Patient Status: Inpatient Height: 65.98 inches Weight: 380 pounds BSA: 2.63 m^2 BMI: 61.36 kg/m^2 Allergies   - Aspirin.   - Sulfa. CONCLUSIONS Summary Left ventricle is normal in size. Global left ventricular systolic function is normal. Calculated ejection fraction 60% by Heart Model. Marked Leftward compression of inter-ventricular septum (\"D-sign\") indicating RV volume or pressure overload. Right ventricular function appears reduced. Moderately dilated right ventricular cavity. Moderate to severe tricuspid regurgitation. Severe pulmonary hypertension. Estimated right ventricular systolic pressure is 34HOYB. Trivial pulmonic insufficiency.  Signature ----------------------------------------------------------------------------  Electronically signed by Juliet Pierre(Sonographer) on 2022  04:14 PM ---------------------------------------------------------------------------- ----------------------------------------------------------------------------  Electronically signed by Don Ellington(Interpreting physician) on 2022  11:48 AM ---------------------------------------------------------------------------- FINDINGS Left Atrium Left atrium is normal in size. Left Ventricle Left ventricle is normal in size. Global left ventricular systolic function is normal. Calculated ejection fraction 60% by Heart Model. Marked Leftward compression of inter-ventricular septum (\"D-sign\") indicating RV volume or pressure overload. Right Atrium Right atrium is normal in size. Right Ventricle Right ventricular function appears reduced. Moderately dilated right ventricular cavity. Mitral Valve Normal mitral valve structure and function. No mitral regurgitation. Aortic Valve Aortic valve is trileaflet and opens adequately. No aortic insufficiency. Tricuspid Valve No obvious valvular abnormality. Moderate to severe tricuspid regurgitation. Severe pulmonary hypertension. Estimated right ventricular systolic pressure is 78ODEA. Pulmonic Valve The pulmonic valve is normal in structure. Trivial pulmonic insufficiency. Pericardial Effusion No pericardial effusion seen. Miscellaneous E/E' average = 7.1. IVC normal diameter & inspiratory collapse indicating normal RA filling pressure .  M-mode / 2D Measurements & Calculations:   LVIDd:3.4 cm(3.7 - 5.6 cm)       Diastolic CMURFP:31.9 ml  ICHW.8 cm(2.2 - 4.0 cm)       Systolic QYFUXN:40.7 ml  ERGM:2.4 cm(0.6 - 1.1 cm)        Aortic Root:2.8 cm(2.0 - 3.7 cm)  LVPWd:1 cm(0.6 - 1.1 cm)         LA Dimension: 2.4 cm(1.9 - 4.0 cm)  Fractional Shortenin.24 %    LA volume/Index: 47.93 ml /18m^2  Calculated LVEF (%): 60.84 %     LVOT:1.7 cm                                   RVDd:4.91 cm   Mitral:                                 Aortic   Valve Area (P1/2-Time): 3.86 cm^2       Peak Velocity: 1.66 m/s  Peak E-Wave: 0.75 m/s                   Mean Velocity: 1.04 m/s  Peak A-Wave: 0.60 m/s                   Peak Gradient: 11.02 mmHg  E/A Ratio: 1.25                         Mean Gradient: 5 mmHg  Peak Gradient: 2.25 mmHg  Mean Gradient: 2 mmHg  Deceleration Time: 195 msec             Area (continuity): 1.37 cm^2  P1/2t: 57 msec                          AV VTI: 29.9 cm   Area (continuity): 1.96 cm^2  Mean Velocity: 0.75 m/s   Tricuspid:                              Pulmonic:   Peak TR Velocity: 4.34 m/s              Peak Velocity: 1.22 m/s  Peak TR Gradient: 75.3424 mmHg          Peak Gradient: 5.95 mmHg  Diastology / Tissue Doppler Septal Wall E' velocity:0.10 m/s Septal Wall E/E':7.7 Lateral Wall E' velocity:0.11 m/s Lateral Wall E/E':6.6    XR ABDOMEN (KUB) (SINGLE AP VIEW)    Result Date: 4/1/2022  EXAMINATION: ONE SUPINE XRAY VIEW(S) OF THE ABDOMEN 4/1/2022 12:45 pm COMPARISON: CT dated 05/08/2019. HISTORY: ORDERING SYSTEM PROVIDED HISTORY: Constipation, right lower abdominal  pain TECHNOLOGIST PROVIDED HISTORY: Constipation, right lower abdominal  pain Reason for Exam: supine FINDINGS: Nonspecific bowel gas pattern is seen with gaseous distension of the stomach. Mild-to-moderate amount of stool is noted in the colon. Evaluation of free air is limited on this supine view. There appears to be Trujillo catheter in place. Nonspecific bowel gas pattern with mild-to-moderate amount of stool noted in the colon. Gaseous distention of the stomach. XR CHEST PORTABLE    Result Date: 4/10/2022  EXAMINATION: ONE XRAY VIEW OF THE CHEST 4/10/2022 9:12 am COMPARISON: 04/08/2022 HISTORY: Reason for Exam: Acute hypoxia admitted with Pul edema FINDINGS: Stable cardiomegaly. Mild central vascular congestion.   Interval increased right perihilar/infrahilar opacity. No sizable effusion or discernible pneumothorax. Osseous structures grossly intact. *Stable cardiomegaly with mild central vascular congestion. *Interval increased right perihilar/infrahilar opacity which may represent developing pneumonia. XR CHEST PORTABLE    Result Date: 4/8/2022  EXAMINATION: ONE XRAY VIEW OF THE CHEST 4/8/2022 4:37 pm COMPARISON: 03/22/2022 HISTORY: ORDERING SYSTEM PROVIDED HISTORY: Shortness of breath, COPD and CHF, recent admission for pneumonia TECHNOLOGIST PROVIDED HISTORY: Shortness of breath, COPD and CHF, recent admission for pneumonia Reason for Exam: upr,sob, FINDINGS: Examination is limited by the patient's body habitus and by portable technique. Cardial pericardial silhouette is prominent but stable. There are low lung volumes is secondary bronchovascular crowding. No focal infiltrate is identified. No pneumothorax. No free air. No acute bony abnormality. Limited negative portable chest radiograph. XR CHEST PORTABLE    Result Date: 3/22/2022  EXAMINATION: ONE XRAY VIEW OF THE CHEST 3/22/2022 9:30 am COMPARISON: 03/19/2022 HISTORY: ORDERING SYSTEM PROVIDED HISTORY: improvement in pnuemonia TECHNOLOGIST PROVIDED HISTORY: improvement in pnuemonia Reason for Exam: ap uprt port chest FINDINGS: As compared to prior examination, there has been significant improvement in the right lower lobe infiltrate. Lungs appear clear. There is cardiomegaly noted. Bony structures unremarkable. Improvement in the the right basal infiltrate. XR CHEST PORTABLE    Result Date: 3/19/2022  EXAMINATION: ONE XRAY VIEW OF THE CHEST 3/19/2022 12:51 am COMPARISON: CT PA performed approximately 10 hours earlier. Portable chest obtained approximately 12 hours earlier.  HISTORY: ORDERING SYSTEM PROVIDED HISTORY: Increasing O2 requirment TECHNOLOGIST PROVIDED HISTORY: Increasing O2 requirment Reason for Exam: shortness of breath, chest pain off and on   upright port FINDINGS: Mild cardiomegaly and normal pulmonary vasculature. Right worse than left bibasilar patchy airspace opacities which appear worse than exam 12 hours earlier. No pneumothorax or pleural effusion. Surrounding structures are unremarkable. Findings suggestive of worsening bibasilar pneumonia. XR CHEST PORTABLE    Result Date: 3/18/2022  EXAMINATION: ONE XRAY VIEW OF THE CHEST 3/18/2022 11:16 am COMPARISON: Chest x-ray dated 29 June 2021 HISTORY: ORDERING SYSTEM PROVIDED HISTORY: sob TECHNOLOGIST PROVIDED HISTORY: sob FINDINGS: Mild stable cardiomegaly with pulmonary vascular congestion. No pneumothorax or pleural effusion. Stable cardiomegaly with mild pulmonary vascular congestion. CT CHEST PULMONARY EMBOLISM W CONTRAST    Result Date: 3/18/2022  EXAMINATION: CTA OF THE CHEST 3/18/2022 1:23 pm TECHNIQUE: CTA of the chest was performed after the administration of intravenous contrast.  Multiplanar reformatted images are provided for review. MIP images are provided for review. Dose modulation, iterative reconstruction, and/or weight based adjustment of the mA/kV was utilized to reduce the radiation dose to as low as reasonably achievable. COMPARISON: None HISTORY: ORDERING SYSTEM PROVIDED HISTORY: sedentary lifestyle, leg swelling, increased O2 TECHNOLOGIST PROVIDED HISTORY: sedentary lifestyle, leg swelling, increased O2 Decision Support Exception - unselect if not a suspected or confirmed emergency medical condition->Emergency Medical Condition (MA) Reason for Exam: sedentary lifestyle, leg swelling, increased O2 FINDINGS: Pulmonary Arteries: Pulmonary arteries are adequately opacified for evaluation. No evidence of intraluminal filling defect to suggest pulmonary embolism. Main pulmonary artery is normal in caliber. Mediastinum: No evidence of mediastinal lymphadenopathy. Small reactive lymph nodes seen in the mediastinum and hilar regions.   The heart and pericardium demonstrate no acute abnormality. There is no acute abnormality of the thoracic aorta. Lungs/pleura: Patchy ground-glass opacity and increased markings seen in the lower lung fields bilaterally concerning for bibasilar infiltrate. No pleural effusion or pneumothorax. No pulmonary mass or nodule. Patchy ground-glass opacity in the upper lung fields bilaterally. Upper Abdomen: Limited images of the upper abdomen are unremarkable. Soft Tissues/Bones: No acute bone or soft tissue abnormality. Degenerative changes seen within the spine with no chest wall abnormality. No evidence of pulmonary embolism. There is patchy ill-defined opacification lower lung fields bilaterally suggesting infiltrate with ground-glass opacity concerning for pneumonia as well in the upper lung fields. Reactive adenopathy throughout the mediastinum and hilar regions. RECOMMENDATIONS: Unavailable     VL DUP LOWER EXTREMITY VENOUS BILATERAL    Result Date: 3/19/2022    [facility]  Vascular Lower Extremities DVT Study Procedure   Patient Name  CARMEN       Date of Study           03/18/2022                [address]   Date of Birth 1975  Gender                  Female   Age           55 year(s)  Race                    Black   Room Number   2008        Height:                 65.98 inch, 167.6 cm   Corporate ID  [id number]    Weight:                 380 pounds, 172.4 kg  #   Patient Acct  [de-identified]   BSA:        2.63 m^2    BMI:       61.36 kg/m^2  #   MR #          [id number]     Sonographer             Hattie Cooley   Accession #   [id number]  Interpreting Physician  Skinny Miranda   Referring                 Referring Physician     Tg Guerrero. Marcin Faulkner DO  Nurse  Practitioner  Procedure Type of Study:   Veins: Lower Extremities DVT Study, Venous Scan Lower Bilateral.  Indications for Study:Leg Swelling and Shortness of breath. Patient Status:ER. Technical Quality:Limited visualization.  Limitation reason:bedside exam , body habitus, deep vessels, edema. Conclusions   Summary   No evidence of superficial or deep venous thrombosis in both lower  extremities. Signature   ----------------------------------------------------------------  Electronically signed by NIKKI Perry(Sonographer) on  03/18/2022 01:27 PM  ----------------------------------------------------------------   ----------------------------------------------------------------  Electronically signed by Alphia Madden Reyes,Arthur(Interpreting  physician) on 03/19/2022 07:28 AM  ----------------------------------------------------------------  Findings:   Right Impression:                    Left Impression:  The common femoral, femoral,         The common femoral, femoral,  popliteal and tibial veins           popliteal and tibial veins  demonstrate normal compressibility   demonstrate normal compressibility  and augmentation. and augmentation. Normal compressibility of the great  Normal compressibility of the great  saphenous vein. saphenous vein. Normal compressibility of the small  Normal compressibility of the small  saphenous vein. saphenous vein. Limited visualization of the         Limited visualization of the  posterior tibial and peroneal veins. posterior tibial and peroneal veins. Risk Factors History +-------------------------------------------+----------+-------------------+ ! Diagnosis                                  ! Date      ! Comments           ! +-------------------------------------------+----------+-------------------+ ! Previous Scan                              !04/04/2008! WNL                ! +-------------------------------------------+----------+-------------------+ ! Chronic lung disease->COPD                 !          !                   ! +-------------------------------------------+----------+-------------------+ ! Previous Scan                              !09/22/2014! Bilateral WNL      ! +-------------------------------------------+----------+-------------------+ ! CHF                                        ! !                   ! +-------------------------------------------+----------+-------------------+   - The patient's risk factor(s) include: chronic lung disease, dyslipidemia     and arterial hypertension.   - The patient has a former tobacco history. Allergies   - Allergy:Aspirin(Drug). - Allergy:Sulfa(Drug). Velocities are measured in cm/s ; Diameters are measured in cm Right Lower Extremities DVT Study Measurements Right 2D Measurements +------------------------------------+----------+---------------+----------+ ! Location                            ! Visualized! Compressibility! Thrombosis! +------------------------------------+----------+---------------+----------+ ! Common Femoral                      !Yes       ! Yes            ! None      ! +------------------------------------+----------+---------------+----------+ ! Prox Femoral                        !Yes       ! Yes            ! None      ! +------------------------------------+----------+---------------+----------+ ! Mid Femoral                         !Partial   !Yes            ! None      ! +------------------------------------+----------+---------------+----------+ ! Dist Femoral                        !Partial   !Yes            ! None      ! +------------------------------------+----------+---------------+----------+ ! Popliteal                           !Yes       ! Yes            ! None      ! +------------------------------------+----------+---------------+----------+ ! Sapheno Femoral Junction            ! Yes       ! Yes            ! None      ! +------------------------------------+----------+---------------+----------+ ! PTV                                 ! Partial   !Yes            ! None      ! +------------------------------------+----------+---------------+----------+ ! Peroneal                            !Partial   !Yes !None      ! +------------------------------------+----------+---------------+----------+ ! Gastroc                             ! Yes       ! Yes            ! None      ! +------------------------------------+----------+---------------+----------+ ! GSV Thigh                           ! Yes       ! Yes            ! None      ! +------------------------------------+----------+---------------+----------+ ! GSV Knee                            ! Yes       ! Yes            ! None      ! +------------------------------------+----------+---------------+----------+ ! GSV Ankle                           ! Yes       ! Yes            ! None      ! +------------------------------------+----------+---------------+----------+ ! SSV                                 ! Yes       ! Yes            ! None      ! +------------------------------------+----------+---------------+----------+ Right Doppler Measurements +--------------------------+---------+------+------------------------------+ ! Location                  ! Signal   !Reflux! Reflux (msec)                 ! +--------------------------+---------+------+------------------------------+ ! Common Femoral            !Pulsatile!      !                              ! +--------------------------+---------+------+------------------------------+ ! Prox Femoral              !Pulsatile!      !                              ! +--------------------------+---------+------+------------------------------+ ! Popliteal                 !Pulsatile!      !                              ! +--------------------------+---------+------+------------------------------+ Left Lower Extremities DVT Study Measurements Left 2D Measurements +------------------------------------+----------+---------------+----------+ ! Location                            ! Visualized! Compressibility! Thrombosis! +------------------------------------+----------+---------------+----------+ ! Common Femoral                      !Yes       ! Yes            ! None ! +------------------------------------+----------+---------------+----------+ ! Prox Femoral                        !Yes       ! Yes            ! None      ! +------------------------------------+----------+---------------+----------+ ! Mid Femoral                         !Partial   !Yes            ! None      ! +------------------------------------+----------+---------------+----------+ ! Dist Femoral                        !Partial   !Yes            ! None      ! +------------------------------------+----------+---------------+----------+ ! Popliteal                           !Yes       ! Yes            ! None      ! +------------------------------------+----------+---------------+----------+ ! Sapheno Femoral Junction            ! Yes       ! Yes            ! None      ! +------------------------------------+----------+---------------+----------+ ! PTV                                 ! Partial   !Yes            ! None      ! +------------------------------------+----------+---------------+----------+ ! Peroneal                            !Partial   !Yes            ! None      ! +------------------------------------+----------+---------------+----------+ ! Gastroc                             ! Yes       ! Yes            ! None      ! +------------------------------------+----------+---------------+----------+ ! GSV Thigh                           ! Yes       ! Yes            ! None      ! +------------------------------------+----------+---------------+----------+ ! GSV Knee                            ! Yes       ! Yes            ! None      ! +------------------------------------+----------+---------------+----------+ ! GSV Ankle                           ! Yes       ! Yes            ! None      ! +------------------------------------+----------+---------------+----------+ ! SSV                                 ! Yes       ! Yes            ! None      ! +------------------------------------+----------+---------------+----------+ Left Doppler Measurements +--------------------------+---------+------+------------------------------+ ! Location                  ! Signal   !Reflux! Reflux (msec)                 ! +--------------------------+---------+------+------------------------------+ ! Common Femoral            !Pulsatile!      !                              ! +--------------------------+---------+------+------------------------------+ ! Prox Femoral              !Pulsatile!      !                              ! +--------------------------+---------+------+------------------------------+ ! Popliteal                 !Pulsatile!      !                              ! +--------------------------+---------+------+------------------------------+    FL MODIFIED BARIUM SWALLOW W VIDEO    Result Date: 3/19/2022  EXAMINATION: MODIFIED BARIUM SWALLOW WAS PERFORMED IN CONJUNCTION WITH SPEECH PATHOLOGY SERVICES TECHNIQUE: Fluoroscopic evaluation of the swallowing mechanism was performed using cineradiography with multiple consistency of barium product in conjunction with speech pathology services. FLUOROSCOPY DOSE AND TYPE OR TIME AND EXPOSURES: Fluoro time: 1.1 minute DAP: 22.570 mGy COMPARISON: None HISTORY: ORDERING SYSTEM PROVIDED HISTORY: h/o esophageal stricture per patient TECHNOLOGIST PROVIDED HISTORY: h/o esophageal stricture per patient 59-year-old female with history of esophageal stricture FINDINGS: No penetration or aspiration with the thin liquid and thick liquid substance by straw, pureed/pudding thick, soft solid and cookie solid substances. No penetration or aspiration with the above administered substances. Please see separate speech pathology report for full discussion of findings and recommendations.          ASSESSMENT & PLAN     ASSESSMENT / PLAN:     Principal Problem:    Acute respiratory failure with hypoxia and hypercapnia (HCC)  Active Problems:    Diarrhea    Asthma    HTN (hypertension)    Morbid obesity ([facility] [address]    Pulmonary hypertension, moderate to severe (HCC)    Morbid obesity with BMI of 50.0-59.9, adult (HCC)    Obesity hypoventilation syndrome (HCC)    STEPH (obstructive sleep apnea)    Chronic respiratory failure (HCC)    Right heart failure, unspecified (HCC)    Normocytic anemia    Hyperlipidemia    Cor pulmonale, chronic (HCC)  Resolved Problems:    * No resolved hospital problems. *      Acute on chronic hypoxic hypercapnic respiratory failure likely secondary to severe pulmonary hypertension, acute on chronic cor pulmonale with history of STEPH/OHS/morbid obesity, asthma/COPD, prior history of tracheostomy  - Successfully weaned down from 20 L high flow nasal cannula to 6 L nasal cannula that she uses at home. - Continues to be on diuretics Lasix 40 IV twice daily, bronchodilators, pulmonology following.  -Long-term plan to have trilogy ventilator, which will be coming to her on May 10th.  - Patient ready to be discharged to SNF now as her oxygen requirement weaned from high flow to 6 L nasal cannula. Pneumonia-completed 7-day course of Levaquin this admission on 4/17    Essential HTN continue amlodipine and spironolactone. Anemia of chronic disease continue iron, folic acid, A17 supplementation    Diabetes mellitus type 2 - Discontinued sliding scale as glycemia under control. Hypokalemia-resolved. Diarrhea -resolved    Morbid obesity -counseled on lifestyle modifications and diet. DVT ppx: heparin  GI ppx: protonix   Diet: Regular    Patient has been seen by palliative care on 4/12/2022. Patient and family want everything to be done to the patient. CODE STATUS -full code. PT/OT/SW : On board. Discharge Planning:     assisting with transitional planning. Called  [phone] for peer to peer. Patient denied at [facility]. Second appeal at [facility] also denied. Pursuing SNF [facility]. Stephan Erickson MD  Internal Medicine Resident, PGY-1  [facility];  [place], [place]  4/26/2022, 7:41 AM    Attending Physician Statement  I have discussed the care of [facility] and I have examined the patient myselft and taken ros and hpi , including pertinent history and exam findings,  with the resident. I have reviewed the key elements of all parts of the encounter with the resident. I agree with the assessment, plan and orders as documented by the resident.   DOWN TO 6 litres  Dc plan per pulm      Electronically signed by Shin Miranda MD

## 2022-04-26 NOTE — PLAN OF CARE
Got message via  that patient was not accepted to any SNF of her choice and she wants to go home with home care, also was told that trilogy ventilator can be delivered today. Confirmed with my attending,Dr. Josseline Silva who is in agreement that patient can leave as long as they can get trilogy ventilator today. Home care orders and JACQUE signed.     Slava Caba MD  Internal Medicine Resident, PGY-2  [facility]  5/08/9386,3:89 PM

## 2022-04-26 NOTE — PROGRESS NOTES
Physical Therapy        Physical Therapy Cancel Note      DATE: 2022    NAME: Susan Wade  MRN: [id number]   : 1975      Patient not seen this date for Physical Therapy due to:    Patient Declined:  awaiting late lunch requesting check back later.  Max encouragement provided with education on importance of OOB/ambulation      Electronically signed by Opal Zazueta PTA on 2022 at 2:58 PM

## 2022-04-26 NOTE — CARE COORDINATION
Received call from Danyelle at [facility]. They are unable to accept d/t no bed availability. Met with pt. She would like to know if [facility] would have an available bed. Voicemail left for Danyelle. Pt would like [facility]. Spoke to [facility]. They do not have a bed available. Pt does not want to go to [facility] anymore. List given from insurance website for more choices    1430 met with pt to discuss transitional planning. There is no other SNF that she wants to go to. She would like to go home. She is requesting an order for a specialized air mattress for her hospital bed. She relates that she gets her bed from [facility]. She is requesting transportation arranged for tomorrow morning so that her son is able to be home. Message left for [facility] requesting return call to determine what kind of order they need for specialized mattress. Spoke to Jessica at [facility]. Care can be resumed within 48 hours of discharge. Spoke to Goran Nova at [facility]. Trilogy is ready to be delivered as soon as she discharges home    1600 updated Dr Harleen Mackay r/e pt's request to go home    [phone] transportation request faxed to [facility] for [facility]. Pt scheduled for transportation at [facility] notified. Goran Nova at [facility] [place] notified. Pt notified and agreeable with plan. Notified her to call [facility] tomorrow for Trilogy delivery.  AVS faxed to [facility]    Discharge [facility] Case Management Department  Written by: Gill Riddle RN    Patient Name: [facility]  Attending Provider: Adryan Fritz MD  Admit Date: 2022  6:22 PM  MRN: [id number]  Account: [de-identified]                     : 1975  Discharge Date: 2022      Disposition: home    Gill Riddle RN

## 2022-04-26 NOTE — PLAN OF CARE
Problem: Falls - Risk of:  Goal: Will remain free from falls  Description: Will remain free from falls  4/26/2022 0732 by El Venegas RN  Outcome: Progressing  4/26/2022 0429 by Corinne Poot, RN  Outcome: Progressing  Goal: Absence of physical injury  Description: Absence of physical injury  4/26/2022 0732 by El Venegas RN  Outcome: Progressing  4/26/2022 0429 by Corinne Poot, RN  Outcome: Progressing     Problem: Skin Integrity:  Goal: Will show no infection signs and symptoms  Description: Will show no infection signs and symptoms  4/26/2022 0732 by El Venegas RN  Outcome: Progressing  4/26/2022 0429 by Corinne Poot, RN  Outcome: Progressing  Goal: Absence of new skin breakdown  Description: Absence of new skin breakdown  4/26/2022 0732 by El Venegas RN  Outcome: Progressing  4/26/2022 0429 by Corinne Poot, RN  Outcome: Progressing     Problem: Nutrition  Goal: Optimal nutrition therapy  4/26/2022 0732 by El Venegas RN  Outcome: Progressing  4/26/2022 0429 by Corinne Poot, RN  Outcome: Progressing     Problem: Pain:  Goal: Pain level will decrease  Description: Pain level will decrease  4/26/2022 0732 by El Venegas RN  Outcome: Progressing  4/26/2022 0429 by Corinne Poot, RN  Outcome: Progressing  Goal: Control of acute pain  Description: Control of acute pain  4/26/2022 0732 by El Venegas RN  Outcome: Progressing  4/26/2022 0429 by Corinne Poot, RN  Outcome: Progressing  Goal: Control of chronic pain  Description: Control of chronic pain  4/26/2022 0732 by El Venegas RN  Outcome: Progressing  4/26/2022 0429 by Corinne Poot, RN  Outcome: Progressing     Problem: Discharge Planning:  Goal: Discharged to appropriate level of care  Description: Discharged to appropriate level of care  4/26/2022 0732 by El Venegas RN  Outcome: Progressing  4/26/2022 0429 by Corinne Poot, RN  Outcome: Progressing     Problem:  Activity:  Goal: Capacity to carry out activities will improve  Description: Capacity to carry out activities will improve  4/26/2022 0732 by Macarena Mason RN  Outcome: Progressing  4/26/2022 0429 by Michael Azar RN  Outcome: Progressing  Goal: Will verbalize the importance of balancing activity with adequate rest periods  Description: Will verbalize the importance of balancing activity with adequate rest periods  4/26/2022 0732 by Macarena Mason RN  Outcome: Progressing  4/26/2022 0429 by Michael Azar RN  Outcome: Progressing     Problem: Cardiac:  Goal: Hemodynamic stability will improve  Description: Hemodynamic stability will improve  4/26/2022 0732 by Macarena Mason RN  Outcome: Progressing  4/26/2022 0429 by Michael Azar RN  Outcome: Progressing  Goal: Ability to maintain an adequate cardiac output will improve  Description: Ability to maintain an adequate cardiac output will improve  4/26/2022 0732 by Macarena Mason RN  Outcome: Progressing  4/26/2022 0429 by Michael Azar RN  Outcome: Progressing     Problem: Coping:  Goal: Verbalizations of decreased anxiety will decrease  Description: Verbalizations of decreased anxiety will decrease  4/26/2022 0732 by Macarena Mason RN  Outcome: Progressing  4/26/2022 0429 by Michael Azar RN  Outcome: Progressing     Problem: Fluid Volume:  Goal: Risk for excess fluid volume will decrease  Description: Risk for excess fluid volume will decrease  4/26/2022 0732 by Macarena Mason RN  Outcome: Progressing  4/26/2022 0429 by Michael Azar RN  Outcome: Progressing  Goal: Maintenance of adequate hydration will improve  Description: Maintenance of adequate hydration will improve  4/26/2022 0732 by Macarena Mason RN  Outcome: Progressing  4/26/2022 0429 by Michael Azar RN  Outcome: Progressing  Goal: Will show no signs and symptoms of electrolyte imbalance  Description: Will show no signs and symptoms of electrolyte imbalance  4/26/2022 0732 by Macarena Mason RN  Outcome: Progressing  4/26/2022 0429 by Michael Azar RN  Outcome: Progressing     Problem: Health Behavior:  Goal: Ability to manage health-related needs will improve  Description: Ability to manage health-related needs will improve  4/26/2022 0732 by Amanda Esqueda RN  Outcome: Progressing  4/26/2022 0429 by Rachel Berry RN  Outcome: Progressing  Goal: Ability to seek appropriate health care will improve  Description: Ability to seek appropriate health care will improve  4/26/2022 0732 by Amanda Esqueda RN  Outcome: Progressing  4/26/2022 0429 by Rachel Berry RN  Outcome: Progressing     Problem: Nutritional:  Goal: Maintenance of adequate nutrition will improve  Description: Maintenance of adequate nutrition will improve  4/26/2022 0732 by Amanda Esqueda RN  Outcome: Progressing  4/26/2022 0429 by Rachel Berry RN  Outcome: Progressing     Problem: Physical Regulation:  Goal: Complications related to the disease process, condition or treatment will be avoided or minimized  Description: Complications related to the disease process, condition or treatment will be avoided or minimized  4/26/2022 0732 by Amanda Esqueda RN  Outcome: Progressing  4/26/2022 0429 by Rachel Berry RN  Outcome: Progressing     Problem: Respiratory:  Goal: Ability to maintain adequate ventilation will improve  Description: Ability to maintain adequate ventilation will improve  4/26/2022 0732 by Amanda Esqueda RN  Outcome: Progressing  4/26/2022 0429 by Rachel Berry RN  Outcome: Progressing  Goal: Respiratory status will improve  Description: Respiratory status will improve  4/26/2022 0732 by Amanda Esqueda RN  Outcome: Progressing  4/26/2022 0429 by Rachel Berry RN  Outcome: Progressing     Problem: Discharge Planning  Goal: Discharge to home or other facility with appropriate resources  4/26/2022 0732 by Amanda Esqueda RN  Outcome: Progressing  4/26/2022 0429 by Rachel Berry RN  Outcome: Progressing     Problem: Chronic Conditions and Co-morbidities  Goal: Patient's chronic conditions and co-morbidity symptoms are monitored and maintained or improved  4/26/2022 0732 by Amanda Esqueda RN  Outcome: Progressing  4/26/2022 0429 by Rachel Berry RN  Outcome: Progressing

## 2022-04-26 NOTE — PROGRESS NOTES
Occupational [address]  Occupational Therapy Not Seen Note    DATE: 2022    NAME: Hodan Kaufman  MRN: [id number]   : 1975      Patient not seen this date for Occupational Therapy due to:    Patient Declined: awaiting late lunch requesting check back later.  Max encouragement provided with education on importance of activity    Next Scheduled Treatment: Attempt in PM or     Electronically signed by SHLOMO Arango on 2022 at 2:20 PM

## 2022-04-26 NOTE — CARE COORDINATION
Call placed to [facility] [address] central intake and spoke with Hunter Coronel to follow up regarding status of SNF referral. Advised that patient is ready for discharge and is off High Flow O2. She states she will send that update to [facility] and have her review and they will get back with me. Call also placed to [facility] with [facility] @ [phone] and left vm message requesting call back for update regarding status of SNF referral as patient is ready for discharge.

## 2022-04-27 NOTE — PROGRESS NOTES
Patient being discharged with prescription. Instructions for d/c and for home care orders. Patient leaving with cell phone and , iv removed. brief changed and pt sent with transport

## 2022-04-27 NOTE — DISCHARGE SUMMARY
[address] [facility]     Department of Internal Medicine - Staff Internal Medicine Teaching Service    INPATIENT DISCHARGE SUMMARY      Patient Identification:  Jenny Gomes is a 55 y.o. female. :  1975  MRN: [id number]     Acct: [de-identified]   PCP: Telma Saldana DO  Admit Date:  2022  Discharge date and time: No discharge date for patient encounter. Attending Provider: Rcahana Liz, [facility] Problem Lists:  Principal Problem:    Acute respiratory failure with hypoxia and hypercapnia (HCC)  Active Problems:    Diarrhea    Asthma    HTN (hypertension)    Morbid obesity ([address]    Pulmonary hypertension, moderate to severe (HCC)    Morbid obesity with BMI of 50.0-59.9, adult (HCC)    Obesity hypoventilation syndrome (HCC)    STEPH (obstructive sleep apnea)    Chronic respiratory failure (HCC)    Right heart failure, unspecified (HCC)    Normocytic anemia    Hyperlipidemia    Cor pulmonale, chronic (HCC)  Resolved Problems:    * No resolved hospital problems. *      HOSPITAL STAY     Brief Inpatient course:   Jenny Gomes is a 55 y.o. female who was admitted for the management of Acute respiratory failure with hypoxia and hypercapnia ([address] presented to the emergency department with worsening shortness of breath. Patient does have known chronic obstructive pulmonary disease, STEPH/OHS, chronic CO2 retention and severe pulmonary hypertension. Patient was discharged home recently on supplemental oxygen and insurance had not approved her trilogy so she went home with her home CPAP. When she arrived to the ED she was saturating in mid 80s and she was placed on non rebreather, eventually on BiPAP alternating with high flow nasal cannula.   Pulmonology was consulted and patient was diuresed while in hospital, even treated with Levaquin for possible pneumonia, started on steroids and slowly taper down, she had a prolonged stay in the hospital due to placement issue, denied LTAC twice, did not get into her choice of SNF, eventually discharged home with home care, will get her trilogy ventilator when she reaches home. She is currently saturating well on nasal cannula at her baseline, using BiPAP nightly, advised to use trilogy vent at home and to follow-up with her lung doctor. Today she had no new complaints. Review of Systems   Constitutional: Negative. HENT: Negative. Eyes: Negative. Respiratory: Negative for cough, choking, chest tightness, shortness of breath and wheezing. Cardiovascular: Negative for chest pain. Gastrointestinal: Negative. Endocrine: Negative. Genitourinary: Negative. Musculoskeletal: Negative. Skin: Negative. Neurological: Negative. Hematological: Negative. Psychiatric/Behavioral: Negative. Physical Exam  Constitutional:       Appearance: Normal appearance. She is obese. HENT:      Nose: Nose normal.      Mouth/Throat:      Mouth: Mucous membranes are moist.   Cardiovascular:      Rate and Rhythm: Normal rate and regular rhythm. Pulses: Normal pulses. Heart sounds: Normal heart sounds. Pulmonary:      Effort: Pulmonary effort is normal.      Breath sounds: Normal breath sounds. Abdominal:      General: Abdomen is flat. Palpations: Abdomen is soft. Musculoskeletal:         General: Normal range of motion. Cervical back: Normal range of motion. Skin:     General: Skin is warm. Neurological:      General: No focal deficit present. Mental Status: She is alert and oriented to person, place, and time.    Psychiatric:         Mood and Affect: Mood normal.         Procedures/ Significant Interventions:    none    Consults:     Consults:     Final Specialist Recommendations/Findings:   IP CONSULT TO HOSPITALIST  IP CONSULT TO INTERNAL MEDICINE  IP CONSULT TO CASE MANAGEMENT  IP CONSULT TO IV TEAM  IP CONSULT TO PULMONOLOGY  IP CONSULT TO PALLIATIVE CARE  IP CONSULT TO HOME CARE NEEDS      Any Hospital Acquired Infections: none    Discharge Functional Status:  stable    DISCHARGE PLAN     Disposition: home    Patient Instructions:   Current Discharge Medication List        START taking these medications    Details   oxyCODONE-acetaminophen (PERCOCET) 5-325 MG per tablet Take 1 tablet by mouth every 8 hours as needed for Pain for up to 3 days. Intended supply: 3 days. Take lowest dose possible to manage pain  Qty: 6 tablet, Refills: 0    Comments: Reduce doses taken as pain becomes manageable  Associated Diagnoses: Chronic narcotic dependence (HCC)      gabapentin (NEURONTIN) 300 MG capsule Take 1 capsule by mouth 3 times daily for 10 days. Qty: 30 capsule, Refills: 0      predniSONE (DELTASONE) 20 MG tablet Take 1 tablet by mouth daily for 3 doses  Qty: 3 tablet, Refills: 0           CONTINUE these medications which have NOT CHANGED    Details   amLODIPine (NORVASC) 5 MG tablet Take 1 tablet by mouth daily  Qty: 30 tablet, Refills: 3      diazePAM (VALIUM) 5 MG tablet Take 5 mg by mouth every 12 hours as needed for Anxiety.       folic acid (FOLVITE) 1 MG tablet Take 1 mg by mouth daily      vitamin B-12 (CYANOCOBALAMIN) 1000 MCG tablet Take 1,000 mcg by mouth daily      Dulaglutide (TRULICITY) 1.29 MU/9.1WD SOPN Inject 0.75 mg into the skin once a week      metOLazone (ZAROXOLYN) 2.5 MG tablet Take 2.5 mg by mouth every other day      glipiZIDE (GLUCOTROL XL) 2.5 MG extended release tablet Take 2.5 mg by mouth      SM MUCUS RELIEF 600 MG extended release tablet Refills: 0      nystatin (MYCOSTATIN) 915662 UNIT/GM cream Refills: 5      Ergocalciferol (VITAMIN D2 PO) Take 50,000 Units by mouth once a week      potassium chloride (KLOR-CON M) 10 MEQ extended release tablet Take 1 tablet by mouth daily  Qty: 180 tablet, Refills: 2    Associated Diagnoses: CKD (chronic kidney disease), stage III (HCC)      bumetanide (BUMEX) 1 MG tablet Take 2 mg by mouth 2 times daily      JANUVIA 50 MG tablet Take 50 mg by mouth daily  Refills: 5      glucose monitoring kit (FREESTYLE) monitoring kit 1 kit by Does not apply route daily  Qty: 1 kit, Refills: 0      blood glucose monitor strips Test three  times a day & as needed for symptoms of irregular blood glucose. Qty: 50 strip, Refills: 0    Comments: Brand per patient preference. May round up to next available package size. Lancets MISC 1 each by Does not apply route daily  Qty: 100 each, Refills: 0      pantoprazole sodium (PROTONIX) 40 MG PACK packet Take 40 mg by mouth every morning (before breakfast)      spironolactone (ALDACTONE) 25 MG tablet Take 1 tablet by mouth daily  Qty: 30 tablet, Refills: 3      isosorbide dinitrate (ISORDIL) 20 MG tablet Take 1 tablet by mouth 3 times daily  Qty: 90 tablet, Refills: 3      hydrALAZINE (APRESOLINE) 25 MG tablet Take 1 tablet by mouth every 8 hours  Qty: 90 tablet, Refills: 3      Blood Pressure KIT 1 Device by Does not apply route 2 times daily  Qty: 1 kit, Refills: 0      ferrous sulfate 325 (65 Fe) MG EC tablet Take 325 g by mouth 2 times daily (with meals)      loratadine (CLARITIN) 10 MG tablet Take 10 mg by mouth daily      tiotropium (SPIRIVA) 18 MCG inhalation capsule Inhale 1 capsule into the lungs       sertraline (ZOLOFT) 100 MG tablet Take 100 mg by mouth daily      atorvastatin (LIPITOR) 40 MG tablet Take 1 tablet by mouth nightly  Qty: 30 tablet, Refills: 3      albuterol sulfate  (90 Base) MCG/ACT inhaler Inhale 2 puffs into the lungs every 6 hours as needed for Wheezing      fluticasone (FLONASE) 50 MCG/ACT nasal spray 1 spray by Nasal route daily       albuterol (PROVENTIL) (2.5 MG/3ML) 0.083% nebulizer solution Take 3 mLs by nebulization every 6 hours as needed for Wheezing. Qty: 120 each, Refills: 3      budesonide-formoterol (SYMBICORT) 160-4.5 MCG/ACT AERO Inhale 2 puffs into the lungs 2 times daily.   Qty: 1 Inhaler, Refills: 3 Activity: activity as tolerated    Diet: regular diet    Follow-up:    Dana AdamDO [address] [place] [address]  [phone]    In 1 week  post hosp follow up    Jeremy Maldonado MD  [address] [place] [address]  [phone]    In 2 weeks  post hosp follow up for pulmonary HTN      Patient Instructions: Need to follow up with PCP and Lung doctor as outpatient for pulmonary HTN  Follow up labs: none  Follow up imaging: none    Note that over 30 minutes was spent in preparing discharge papers, discussing discharge with patient, medication review, etc.      Suresh Frey MD,   Internal Medicine Resident, PGY-2  [facility]; [place], [place]  4/27/2022, 7:53 AM    Attending Physician Statement  I have discussed the care of [facility] Francesca Filion and I have examined the patient myselft and taken ros and hpi , including pertinent history and exam findings,  with the resident. I have reviewed the key elements of all parts of the encounter with the resident. I agree with the assessment, plan and orders as documented by the resident. I spent over 35 mins in direct patient care as above and reviewing medications and counseling for discharge .         Electronically signed by Shin Miranda MD

## 2022-06-08 PROBLEM — U07.1 COVID-19 VIRUS INFECTION: Status: ACTIVE | Noted: 2022-01-01

## 2022-06-08 PROBLEM — I50.33 ACUTE ON CHRONIC DIASTOLIC CONGESTIVE HEART FAILURE (HCC): Status: ACTIVE | Noted: 2022-01-01

## 2022-06-08 NOTE — PLAN OF CARE

## 2022-06-08 NOTE — PROGRESS NOTES
[address] [facility]   Department of Internal Medicine - Staff Internal Medicine Teaching Service        Senior Note    Date: 6/8/2022  Patient Name: Bolivar Joel  MRN: [id number]  Date of Admission: 6/8/2022  6:12 AM  YOB: 1975  Primary Care Physician: Elsa Peraza DO  Attending Physician: Jesus Dodson MD   Room: CAMILA/CAMILA    Brief Summary:-  Bolivar Joel is an 55 y.o. female who presented to the ER with chief complaints of worsening shortness of breath. She has trilogy ventilator. Reported worsening shortness of breath for last couple of days. PMH significant for   -Diastolic heart failure with EF 60% 3/22/2022,   -Severe pulmonary hypertension with RVSP 80 mmHg and positive D sign on echo. -Moderate to severe TR.  -COPD on home O2/trilogy  -Hypertension  -DM type II  A1c 6.4, 3/19/2022  -STEPH on CPAP/BiPAP  -Morbid obesity with BMI 52.46  -22-pack-year smoking history  -CKD with baseline creatinine 0.9-1.2, likely secondary to hypertension, diabetes  -Nonischemic cardiomyopathy with normal cath in 2018.  -H/o MI, cardiac arrest and encephalopathy in 2018, requiring rehab. -Anemia of chronic disease from both iron deficiency with recent labs on 4/10/2022, low iron 36, low iron saturation 15, increased ferritin 327, decreased TIBC 239. Patient was complaining of worsening shortness of breath yesterday as well, EMS was called, patient received breathing treatment and refused to go to the hospital.  This morning her respiratory status worsened, and was brought to the ED by EMS. As per EMS she was satting in 46s was placed on CPAP, saturation improved to 80s. In the ED patient was afebrile, tachycardic , tachypneic RR in 20s, blood pressure 103/62 and distress, was placed on BiPAP.   She received magnesium, Solu-Medrol and DuoNeb    Chest x-ray showed cardiomegaly with mild vascular congestion which was unchanged from the previous

## 2022-06-08 NOTE — FLOWSHEET NOTE
SPIRITUAL CARE DEPARTMENT - [address]  PROGRESS NOTE    Shift date: 6/07/2022  Shift day: Tuesday   Shift # 3    Room # 14/14   Name: Amrik Jauregui                Gnosticism: [address] [employer]: Unknown    Referral: Routine Visit    Admit Date & Time: 6/8/2022  6:12 AM    Assessment:  Amrik Jauregui is a 55 y.o. female in the hospital because of \"Respiratory Distress. \" Patient was wearing a breathing mask, when  visited. Intervention:  Writer introduced self and title as  .  learned that patient was coping well this morning and indicated no needs at time of visit. Patient shared that her son is aware of her arrival to the hospital. Patient indicated that she will contact more family once she is admitted to the main hospital.     Outcome:  Patient thanked  for care and support. Plan:  Chaplains will remain available to offer spiritual and emotional support as needed.        06/08/22 0730   Encounter Summary   Service Provided For: Patient   Referral/Consult From: Rounding  (ED Rounding)   Support System Children   Last Encounter  06/07/22   Complexity of Encounter Low   Begin Time 0730   End Time  0746   Total Time Calculated 16 min   Encounter    Type Initial Screen/Assessment   Spiritual/Emotional needs   Type Spiritual Support   Assessment/Intervention/Outcome   Assessment Calm;Coping   Intervention Sustaining Presence/Ministry of presence   Outcome Receptive   Plan and Referrals   Plan/Referrals Continue to visit, (comment)  (as needed)     Electronically signed by Shyam Siu on 6/8/2022 at 7:57 AM.  [address]  [phone]

## 2022-06-08 NOTE — PROGRESS NOTES
06/08/22 0618   NIV Type   Mode Bilevel   Settings/Measurements   IPAP 18 cmH20   CPAP/EPAP 10 cmH2O   FiO2  100 %   Upon arrival to facility, pt placed on above settings.

## 2022-06-08 NOTE — ED PROVIDER NOTES
[address] [facility]     Emergency Department     Faculty Attestation    I performed a history and physical examination of the patient and discussed management with the resident. I have reviewed and agree with the residents findings including all diagnostic interpretations, and treatment plans as written. Any areas of disagreement are noted on the chart. I was personally present for the key portions of any procedures. I have documented in the chart those procedures where I was not present during the key portions. I have reviewed the emergency nurses triage note. I agree with the chief complaint, past medical history, past surgical history, allergies, medications, social and family history as documented unless otherwise noted below. Documentation of the HPI, Physical Exam and Medical Decision Making performed by scribyovana is based on my personal performance of the HPI, PE and MDM. For Physician Assistant/ Nurse Practitioner cases/documentation I have personally evaluated this patient and have completed at least one if not all key elements of the E/M (history, physical exam, and MDM). Additional findings are as noted. 56 yo F acute sob, worsening for 24 h, hx copd / chf, ems started cpap,   pe moderate distress, bmi 52,   Coarse expiratory wheeze bilaterally, diminished bases, obese lower extremities, non tender,     Steroid, neb, mag, cxr, bipap,  > will admit, trop stable, s/s improved,     EKG Interpretation    Interpreted by me  Sinus rhythm, heart rate 99, no ST elevation, normal axis, QT corrected 462,    CRITICAL CARE: There was a high probability of clinically significant/life threatening deterioration in this patient's condition which required my urgent intervention. Total critical care time was 15 minutes. This excludes any time for separately reportable procedures.        [address], DO  06/08/22 0942       Jae Hoang, DO  06/08/22 [id number]

## 2022-06-08 NOTE — ED NOTES
Pt brief changed and pericare performed. Pt applied with purewick. Pt repositioned.       Robbie Fritz RN  06/08/22 2964

## 2022-06-08 NOTE — H&P
[facility]     Department of Internal Medicine - Staff Internal Medicine Teaching Service          ADMISSION NOTE/HISTORY AND PHYSICAL EXAMINATION   Date: 6/8/2022  Patient Name: Jono Ramsey  Date of admission: 6/8/2022  6:12 AM  YOB: 1975  PCP: Shayne Regan DO  History Obtained From:  patient, electronic medical record    CHIEF COMPLAINT     Chief complaint: SOB orthopnea    HISTORY OF PRESENTING ILLNESS     The patient is a pleasant 55 y.o. female with a past medical history significant for diastolic heart failure with severe pulmonary hypertension, COPD on 5 to 6 L home oxygen with trilogy ventilator, essential hypertension and type 2 diabetes mellitus presented by the EMS with worsening respiratory status and was found hypoxic with SPO2 of 60% improved to 88% on CPAP. She admits to worsening respiratory status for the last 3 days, noted to be more dyspneic and orthopneic. Patient has been compliant with all her medicines including diuretic and inhaler. Denies sick contact or fever chills chest pain palpitation dizziness syncope nausea vomiting or diarrhea. On evaluation in the ER, she was started on BiPAP with FiO2 100%. Blood pressure was 140/80, heart rate 94. Chest auscultation showed bilateral expiratory wheezes with basal crackles. JVD could not be appreciated due to body habitus. Abdomen is soft nontender. Labs reviewed  CXR: Cardiomegaly mild vascular congestion similar to prior  VBG 7.3 6/60/35/34. Sodium 139 potassium 3.8 chloride 94. BUN 16, creatinine 0.87. No leukocytosis noted. Troponin negative. proBNP 4082. A month ago it was 1086. Could be falsely low because of morbid obesity. Patient was given 125 mg of Solu-Medrol and 2 g of magnesium. Admitted to our service for the management of acute on chronic hypoxic hypercapnic respiratory failure secondary to diastolic heart failure and COPD exacerbation.     Review of Systems:  General ROS: Completed and except as mentioned above were negative   HEENT ROS: Completed and except as mentioned above were negative   Allergy and Immunology ROS:  Completed and except as mentioned above were negative  Hematological and Lymphatic ROS:  Completed and except as mentioned above were negative  Respiratory ROS:  Completed and except as mentioned above were negative  Cardiovascular ROS:  Completed and except as mentioned above were negative  Gastrointestinal ROS: Completed and except as mentioned above were negative  Genito-Urinary ROS:  Completed and except as mentioned above were negative  Musculoskeletal ROS:  Completed and except as mentioned above were negative  Neurological ROS:  Completed and except as mentioned above were negative  Skin & Dermatological ROS:  Completed and except as mentioned above were negative  Psychological ROS:  Completed and except as mentioned above were negative    PAST MEDICAL HISTORY     Past Medical History:   Diagnosis Date    Acute on chronic diastolic CHF (congestive heart failure) ([facility] 09/15/2018    HIEN (acute kidney injury) ([facility] 05/06/2019    HIEN (acute kidney injury) ([facility] 11/20/2018    Asthma     CHF     Chronic obstructive lung disease ([facility]     Chronic respiratory failure with hypoxia ([facility]     on home O2 therapy    COPD     Diabetes mellitus, [facility] onset ([facility] 05/06/2019    Former smoker     quit smoking about 17 months ago/ She has a 22.00 pack-year smoking history    HTN     Hyperglycemia     Hyperlipidemia     Hypertension     Morbid obesity with BMI of 60.0-69.9, adult ([facility]     Neuromuscular disorder ([facility]     Neuropathy Right hand    STEPH on CPAP     DME per Dr. Mariela Rodriguez for CPAP of 18 cmh2o    Oxygen dependent     DME 06/2018 for home oxgyen at 2.5-3 lpm ATC    Pedal edema     Pneumonia     Pulmonary hypertension, moderate to severe ([facility] 06/09/2018    Torn meniscus     Wears glasses        PAST SURGICAL HISTORY     Past Surgical History:   Procedure Laterality Date    ABDOMEN SURGERY      Patient had a PEG tube placed 1/2018, removed 4/2018    [address]    CYSTOSCOPY  June 5, 2019    CYSTOSCOPY N/A 6/5/2019    CYSTOSCOPY performed by Marcell Madrid [facility] at [facility]  02/2018    Removed in April 2018    [facility] CATH POWER PICC TRIPLE  2/2/2018         JOINT REPLACEMENT      [place] OFFICE/OUTPT VISIT,PROCEDURE ONLY N/A 2/17/2018    TRACHEOTOMY performed by Harish Adam MD at [address]  03/2018    TRACHEOTOMY  02/2018       ALLERGIES     Bee venom, Sulfa antibiotics, Asa [aspirin], Aspirin, Beta adrenergic blockers, Beta adrenergic blockers, and Sulfa antibiotics    MEDICATIONS PRIOR TO ADMISSION     Prior to Admission medications    Medication Sig Start Date End Date Taking? Authorizing Provider   gabapentin (NEURONTIN) 300 MG capsule Take 1 capsule by mouth 3 times daily for 10 days. 4/13/22 4/23/22  Yimi Ortega MD   amLODIPine (NORVASC) 5 MG tablet Take 1 tablet by mouth daily 3/28/22   Maxi Powell MD   diazePAM (VALIUM) 5 MG tablet Take 5 mg by mouth every 12 hours as needed for Anxiety.     Historical Provider, MD   folic acid (FOLVITE) 1 MG tablet Take 1 mg by mouth daily    Historical Provider, MD   vitamin B-12 (CYANOCOBALAMIN) 1000 MCG tablet Take 1,000 mcg by mouth daily    Historical Provider, MD   Dulaglutide (TRULICITY) 5.55 QM/0.8WN SOPN Inject 0.75 mg into the skin once a week    Historical Provider, MD   metOLazone (ZAROXOLYN) 2.5 MG tablet Take 2.5 mg by mouth every other day    Historical Provider, MD   glipiZIDE (GLUCOTROL XL) 2.5 MG extended release tablet Take 2.5 mg by mouth  Patient not taking: Reported on 4/9/2022    Historical Provider, MD   SM MUCUS RELIEF 600 MG extended release tablet  10/15/19   Historical Provider, MD   nystatin (MYCOSTATIN) 553786 UNIT/GM cream  10/17/19   Historical Provider, MD   Ergocalciferol (VITAMIN D2 PO) Take 50,000 Units by mouth once a week  Patient not taking: Reported on 3/18/2022    Historical Provider, MD   potassium chloride (KLOR-CON M) 10 MEQ extended release tablet Take 1 tablet by mouth daily 7/25/19   Hernesto Hendricks MD   bumetanide (BUMEX) 1 MG tablet Take 2 mg by mouth 2 times daily    Historical Provider, MD   JANUVIA 50 MG tablet Take 50 mg by mouth daily 6/11/19   Historical Provider, MD   glucose monitoring kit (FREESTYLE) monitoring kit 1 kit by Does not apply route daily 5/8/19   Juanis Joyce MD   blood glucose monitor strips Test three  times a day & as needed for symptoms of irregular blood glucose.  5/8/19   Juanis Joyce MD   Lancets MISC 1 each by Does not apply route daily 5/8/19   Juanis Joyce MD   pantoprazole sodium (PROTONIX) 40 MG PACK packet Take 40 mg by mouth every morning (before breakfast)    Historical Provider, MD   spironolactone (ALDACTONE) 25 MG tablet Take 1 tablet by mouth daily 11/22/18   Yariel Taylor MD   isosorbide dinitrate (ISORDIL) 20 MG tablet Take 1 tablet by mouth 3 times daily 9/20/18   Dez Owens MD   hydrALAZINE (APRESOLINE) 25 MG tablet Take 1 tablet by mouth every 8 hours 9/20/18   Dez Owens MD   Blood Pressure KIT 1 Device by Does not apply route 2 times daily 9/20/18   Joseph Garcia MD   ferrous sulfate 325 (65 Fe) MG EC tablet Take 325 g by mouth 2 times daily (with meals) 4/20/18   Historical Provider, MD   loratadine (CLARITIN) 10 MG tablet Take 10 mg by mouth daily    Historical Provider, MD   tiotropium (SPIRIVA) 18 MCG inhalation capsule Inhale 1 capsule into the lungs  8/31/17   Historical Provider, MD   sertraline (ZOLOFT) 100 MG tablet Take 100 mg by mouth daily    Historical Provider, MD   atorvastatin (LIPITOR) 40 MG tablet Take 1 tablet by mouth nightly 2/21/18   Santiago Payne MD   albuterol sulfate  (90 Base) MCG/ACT inhaler Inhale 2 puffs into the lungs every 6 hours as needed for Wheezing Historical Provider, MD   fluticasone (FLONASE) 50 MCG/ACT nasal spray 1 spray by Nasal route daily     Historical Provider, MD   albuterol (PROVENTIL) (2.5 MG/3ML) 0.083% nebulizer solution Take 3 mLs by nebulization every 6 hours as needed for Wheezing. 14   Pooja Jarvis MD   budesonide-formoterol (SYMBICORT) 160-4.5 MCG/ACT AERO Inhale 2 puffs into the lungs 2 times daily. 14   Rachana Liz MD       SOCIAL HISTORY     Tobacco: 22 yr pack smoking history. Not actively smoking  Alcohol: denies alcohol intake  Illicits: denies illicit drug use. FAMILY HISTORY     Family History   Problem Relation Age of Onset    Emphysema Mother    Randolph Alzheimer's Disease Mother     Other Mother         Blood disorder    High Blood Pressure Father    Randolph Arthritis Father     Diabetes Sister     Heart Failure Sister     Kidney Disease Sister     High Blood Pressure Brother     Dementia Maternal Grandmother     High Blood Pressure Maternal Grandmother     Dementia Maternal Grandfather     High Blood Pressure Maternal Grandfather     Cancer Paternal Grandmother     Heart Disease Paternal Grandfather     Diabetes Sister     Heart Failure Sister     Other Sister         Intestinal problems    No Known Problems Sister     No Known Problems Son        PHYSICAL EXAM     Vitals: /83   Pulse 95   Temp 98 °F (36.7 °C) (Axillary)   Resp 21   Wt (!) 325 lb (147.4 kg)   SpO2 90%   BMI 52.46 kg/m²   Tmax: Temp (24hrs), Av °F (36.7 °C), Min:98 °F (36.7 °C), Max:98 °F (36.7 °C)    Last Body weight:   Wt Readings from Last 3 Encounters:   22 (!) 325 lb (147.4 kg)   22 (!) 334 lb 3.5 oz (151.6 kg)   22 (!) 355 lb (161 kg)     Body Mass Index : Body mass index is 52.46 kg/m².       PHYSICAL EXAMINATION:  Constitutional: This is a well developed, well nourished, Greater than 36 - Morbid Obesity / Extreme Obesity / Grade III 55y.o. year old female who is alert, oriented, cooperative and in apparent distress on BiPAP. Head:normocephalic and atraumatic. EENT:  PERRLA. No conjunctival injections. Septum was midline, mucosa was without erythema, exudates or cobblestoning. No thrush was noted. Neck: Supple without thyromegaly. No elevated JVP. Trachea was midline. Respiratory: Auscultation reviewed bilateral expiratory wheezes with basal crackles. There is no intercostal retraction or use of accessory muscles. No egophony noted. Cardiovascular: Regular without murmur, clicks, gallops or rubs. Abdomen: Slightly rounded and soft without organomegaly. No rebound, rigidity or guarding was appreciated. Lymphatic: No lymphadenopathy. Musculoskeletal: Normal curvature of the spine. No gross muscle weakness. Extremities:  No lower extremity edema, ulcerations, tenderness, varicosities or erythema. Muscle size, tone and strength are normal.  No involuntary movements are noted. Skin:  Warm and dry. Good color, turgor and pigmentation. No lesions or scars.   No cyanosis or clubbing  Neurological/Psychiatric: The patient's general behavior, level of consciousness, thought content and emotional status is normal.        INVESTIGATIONS     Laboratory Testing:     Recent Results (from the past 24 hour(s))   EKG 12 Lead    Collection Time: 06/08/22  6:17 AM   Result Value Ref Range    Ventricular Rate 99 BPM    Atrial Rate 99 BPM    P-R Interval 148 ms    QRS Duration 84 ms    Q-T Interval 360 ms    QTc Calculation (Bazett) 462 ms    P Axis 46 degrees    R Axis 53 degrees    T Axis -17 degrees   Venous Blood Gas, POC    Collection Time: 06/08/22  6:26 AM   Result Value Ref Range    pH, Zaid 7.361 7.320 - 7.430    pCO2, Zaid 60.6 (H) 41.0 - 51.0 mm Hg    pO2, Zaid 34.8 30.0 - 50.0 mm Hg    HCO3, Venous 34.3 (H) 22.0 - 29.0 mmol/L    Positive Base Excess, Zaid 7 (H) 0.0 - 3.0    O2 Sat, Zaid 62 60.0 - 85.0 %   ELECTROLYTES PLUS    Collection Time: 06/08/22  6:26 AM   Result Value Ref Range    POC Sodium 141 138 - 146 mmol/L    POC Potassium 3.6 3.5 - 4.5 mmol/L    POC Chloride 100 98 - 107 mmol/L    POC TCO2 35 (H) 22 - 30 mmol/L    Anion Gap 7 7 - 16 mmol/L   Hemoglobin and hematocrit, blood    Collection Time: 06/08/22  6:26 AM   Result Value Ref Range    POC Hemoglobin 14.0 12.0 - 16.0 g/dL    POC Hematocrit 41 36 - 46 %   Creatinine W/GFR Point of Care    Collection Time: 06/08/22  6:26 AM   Result Value Ref Range    POC Creatinine 0.89 0.51 - 1.19 mg/dL    GFR Comment >60 >60 mL/min    GFR Non-African American >60 >60 mL/min    GFR Comment         CALCIUM, IONIC (POC)    Collection Time: 06/08/22  6:26 AM   Result Value Ref Range    POC Ionized Calcium 1.12 (L) 1.15 - 1.33 mmol/L   POCT urea (BUN)    Collection Time: 06/08/22  6:26 AM   Result Value Ref Range    POC BUN 16 8 - 26 mg/dL   Lactic Acid, POC    Collection Time: 06/08/22  6:26 AM   Result Value Ref Range    POC Lactic Acid 1.08 0.56 - 1.39 mmol/L   POCT Glucose    Collection Time: 06/08/22  6:26 AM   Result Value Ref Range    POC Glucose 191 (H) 74 - 100 mg/dL   CBC with Auto Differential    Collection Time: 06/08/22  6:27 AM   Result Value Ref Range    WBC 10.1 3.5 - 11.3 k/uL    RBC 3.90 (L) 3.95 - 5.11 m/uL    Hemoglobin 10.5 (L) 11.9 - 15.1 g/dL    Hematocrit 35.7 (L) 36.3 - 47.1 %    MCV 91.5 82.6 - 102.9 fL    MCH 26.9 25.2 - 33.5 pg    MCHC 29.4 28.4 - 34.8 g/dL    RDW 14.7 (H) 11.8 - 14.4 %    Platelets 418 006 - 972 k/uL    MPV 10.8 8.1 - 13.5 fL    NRBC Automated 0.0 0.0 per 100 WBC    Seg Neutrophils 85 (H) 36 - 65 %    Lymphocytes 8 (L) 24 - 43 %    Monocytes 6 3 - 12 %    Eosinophils % 0 (L) 1 - 4 %    Basophils 0 0 - 2 %    Immature Granulocytes 1 (H) 0 %    Segs Absolute 8.56 (H) 1.50 - 8.10 k/uL    Absolute Lymph # 0.79 (L) 1.10 - 3.70 k/uL    Absolute Mono # 0.63 0.10 - 1.20 k/uL    Absolute Eos # <0.03 0.00 - 0.44 k/uL    Basophils Absolute <0.03 0.00 - 0.20 k/uL    Absolute Immature Granulocyte 0.09 0.00 - 0.30 k/uL    RBC Morphology ANISOCYTOSIS PRESENT    Basic Metabolic Panel w/ Reflex to MG    Collection Time: 06/08/22  6:27 AM   Result Value Ref Range    Glucose 180 (H) 70 - 99 mg/dL    BUN 16 6 - 20 mg/dL    CREATININE 0.87 0.50 - 0.90 mg/dL    Calcium 9.2 8.6 - 10.4 mg/dL    Sodium 139 135 - 144 mmol/L    Potassium 3.8 3.7 - 5.3 mmol/L    Chloride 94 (L) 98 - 107 mmol/L    CO2 30 20 - 31 mmol/L    Anion Gap 15 9 - 17 mmol/L    GFR Non-African American >60 >60 mL/min    GFR African American >60 >60 mL/min    GFR Comment         Troponin    Collection Time: 06/08/22  6:27 AM   Result Value Ref Range    Troponin, High Sensitivity 12 0 - 14 ng/L   Brain Natriuretic Peptide    Collection Time: 06/08/22  6:27 AM   Result Value Ref Range    Pro-BNP 4,082 (H) <300 pg/mL       Imaging:   XR CHEST PORTABLE    Result Date: 6/8/2022  Cardiomegaly mild vascular congestion similar to prior. ASSESSMENT & PLAN     ASSESSMENT / PLAN:     IMPRESSION  This is a 55 y.o. female with a past medical history significant for COPD on 5 to 6 L home oxygen with trilogy ventilator, diastolic heart failure with severe pulmonary hypertension who presented with worsening shortness of breath orthopnea and PND and found to have hypoxia and worsening respiratory status. Patient admitted to inpatient for the management of acute on chronic hypoxic hypercapnic respiratory failure secondary to acute exacerbation of diastolic heart failure and COPD. Principal Problem:  Acute on chronic CHF; HFpEF EF 75% 02/2019; resumed home isordil 20 mg tid and aldactone 25 mg qd;   Started on lasix 60 mg bid. Home medication include bumex 2 mg bid. Last Echo result indicative of EF of 60% with severe pulmonary hypertension     COPD exacerbation -    Patient is on trilogy ventilator and required 6 to 7 L oxygen at home. continue albuterol, symbicort, spiriva. Added DuoNeb. Received 125 mg of Solu-Medrol.   Started on Solu-Medrol 60 mg IV once daily.     Acute on chronic hypoxic respiratory failure secondary to #1 and #2 and COVID-19 infection:  Patient currently saturating on maximum setting of BiPAP. She tested positive for COVID-19 infection. Chest x-ray did not show any infiltrates but due to desaturation and positive COVID-19 infection, will order ESR CRP ferritin and D-dimers. Will need CT scan of the chest to look for COVID infection findings. Will start patient on Decadron 10 mg once daily. Tocilizumab and antiviral regimen after inflammatory markers and as per ID recommendation. Will consult ID.     Type 2 DM HbA1C 6.4 03/22-   Ordered HbA1c. Medium dose insulin sliding scale. POC glucose every 6 hour. Hypoglycemia protocol. Carb controlled diet.     HTN - Norvasc 5 mg qd, hydralazine 25 mg q8h     HLD - Lipitor 40 mg qd    MDD - Zoloft     Morbid obesity - Counseled     DVT ppx: Lovenox 30 mg twice daily. GI ppx: Pepcid 20 mg twice daily. PT/OT/SW: Consulted  Discharge Planning: Ongoing    Teja De Jesus MD  Internal Medicine Resident, PGY-1  [facility];  [place], [place]  6/8/2022, 10:08 AM

## 2022-06-08 NOTE — ED PROVIDER NOTES
[facility]  ED  Emergency Department Encounter  Emergency Medicine Resident     Pt Name: Anita Retana  MRN: [id number]  Victorinogfabelardo 1975  Date of evaluation: 6/8/22  PCP:  Lewis Washington DO    CHIEF COMPLAINT       Chief Complaint   Patient presents with    Respiratory Distress       HISTORY Renetta  (Location/Symptom, Timing/Onset, Context/Setting, Quality, Duration, Modifying Factors,Severity.)      Anita Retana is a 55 y. o.yo female who presents with respite distress. Patient began feeling short of breath a few days ago. Called EMS yesterday who came to her house, provided with a few breathing treatments, and she chose not to go to the hospital.  Woke up this morning and was feeling short of breath again. Increased work of breathing. Called EMS, when they arrived she was satting in the 46s. Placed on CPAP. Patient reportedly took her home medications prior to EMS arrival so they did not give any medications. Unable to obtain IV access so they did not give any magnesium or other medications. Patient has history of CHF as well as COPD. Denies any leg swelling currently. States is been compliant with her medications. Additionally patient states she has been intubated in the past and did require a tracheostomy. States she was told if she ever would need to be intubated in the future should be a very difficult airway as she has some scar tissue.     PAST MEDICAL / SURGICAL / SOCIAL / FAMILY HISTORY      has a past medical history of Acute on chronic diastolic CHF (congestive heart failure) ([address] HIEN (acute kidney injury) ([address] HIEN (acute kidney injury) ([address] Asthma, CHF, Chronic obstructive lung disease ([address] Chronic respiratory failure with hypoxia ([address] COPD, Diabetes mellitus, new onset ([address] Former smoker, HTN, Hyperglycemia, Hyperlipidemia, Hypertension, Morbid obesity with BMI of 60.0-69.9, adult ([address] Neuromuscular disorder ([address] STEPH on CPAP, Oxygen dependent, Pedal edema, Pneumonia, Pulmonary hypertension, moderate to severe ([address] Torn meniscus, and Wears glasses. has a past surgical history that includes  section (); Abdomen surgery; joint replacement; Cystoscopy (2019); Cystoscopy (N/A, 2019); hc cath power picc triple (2018); pr office/outpt visit,procedure only (N/A, 2018); Tracheotomy (2018); Gastrostomy tube placement (2018); and tracheostomy closure (2018). Social History     Socioeconomic History    Marital status: Single     Spouse name: Not on file    Number of children: Not on file    Years of education: Not on file    Highest education level: Not on file   Occupational History    Not on file   Tobacco Use    Smoking status: Former Smoker     Packs/day: 1.00     Years: 22.00     Pack years: 22.00     Types: Cigarettes     Start date:      Quit date: 1/15/2018     Years since quittin.3    Smokeless tobacco: Never Used   Vaping Use    Vaping Use: Never used   Substance and Sexual Activity    Alcohol use: No    Drug use: No    Sexual activity: Not Currently   Other Topics Concern    Not on file   Social History Narrative    ** Merged History Encounter **          Social Determinants of Health     Financial Resource Strain: Low Risk     Difficulty of Paying Living Expenses: Not hard at all   Food Insecurity: Food Insecurity Present    Worried About [address] in the Last Year: Never true    [address] in the Last Year: Sometimes true   Transportation Needs: No Transportation Needs    Lack of Transportation (Medical): No    Lack of Transportation (Non-Medical): No   Physical Activity: Inactive    Days of Exercise per Week: 0 days    Minutes of Exercise per Session: 0 min   Stress: Stress Concern Present    Feeling of Stress : To some extent   Social Connections: Moderately Integrated    Frequency of Communication with Friends and Family:  Three times a week    Frequency of Social Gatherings with Friends and Family: Three times a week    Attends [employer] Services: 1 to 4 times per year    Active Member of Clubs or Organizations: No    Attends Club or Organization Meetings: 1 to 4 times per year    Marital Status: Never    Intimate Partner Violence: Not At Risk    Fear of Current or Ex-Partner: No    Emotionally Abused: No    Physically Abused: No    Sexually Abused: No   Housing Stability: Unknown    Unable to Pay for Housing in the Last Year: No    Number of [place] in the Last Year: Not on file    Unstable Housing in the Last Year: No       Family History   Problem Relation Age of Onset    Emphysema Mother     Alzheimer's Disease Mother     Other Mother         Blood disorder    High Blood Pressure Father     Arthritis Father     Diabetes Sister     Heart Failure Sister     Kidney Disease Sister     High Blood Pressure Brother     Dementia Maternal Grandmother     High Blood Pressure Maternal Grandmother     Dementia Maternal Grandfather     High Blood Pressure Maternal Grandfather     Cancer Paternal Grandmother     Heart Disease Paternal Grandfather     Diabetes Sister     Heart Failure Sister     Other Sister         Intestinal problems    No Known Problems Sister     No Known Problems Son         Allergies:  Bee venom, Sulfa antibiotics, Asa [aspirin], Aspirin, Beta adrenergic blockers, Beta adrenergic blockers, and Sulfa antibiotics    Home Medications:  Prior to Admission medications    Medication Sig Start Date End Date Taking? Authorizing Provider   gabapentin (NEURONTIN) 300 MG capsule Take 1 capsule by mouth 3 times daily for 10 days. 4/13/22 4/23/22  Sheila Miranda MD   amLODIPine (NORVASC) 5 MG tablet Take 1 tablet by mouth daily 3/28/22   Rafaela Daniel MD   diazePAM (VALIUM) 5 MG tablet Take 5 mg by mouth every 12 hours as needed for Anxiety.     Historical Provider, MD   folic acid (FOLVITE) 1 MG tablet Take 1 mg by mouth daily    Historical Provider, MD   vitamin B-12 (CYANOCOBALAMIN) 1000 MCG tablet Take 1,000 mcg by mouth daily    Historical Provider, MD   Dulaglutide (TRULICITY) 6.05 AU/6.5WY SOPN Inject 0.75 mg into the skin once a week    Historical Provider, MD   metOLazone (ZAROXOLYN) 2.5 MG tablet Take 2.5 mg by mouth every other day    Historical Provider, MD   glipiZIDE (GLUCOTROL XL) 2.5 MG extended release tablet Take 2.5 mg by mouth  Patient not taking: Reported on 4/9/2022    Historical Provider, MD   SM MUCUS RELIEF 600 MG extended release tablet  10/15/19   Historical Provider, MD   nystatin (MYCOSTATIN) 918308 UNIT/GM cream  10/17/19   Historical Provider, MD   Ergocalciferol (VITAMIN D2 PO) Take 50,000 Units by mouth once a week  Patient not taking: Reported on 3/18/2022    Historical Provider, MD   potassium chloride (KLOR-CON M) 10 MEQ extended release tablet Take 1 tablet by mouth daily 7/25/19   Errol Mayers MD   bumetanide (BUMEX) 1 MG tablet Take 2 mg by mouth 2 times daily    Historical Provider, MD   JANUVIA 50 MG tablet Take 50 mg by mouth daily 6/11/19   Historical Provider, MD   glucose monitoring kit (FREESTYLE) monitoring kit 1 kit by Does not apply route daily 5/8/19   Shanita Ta MD   blood glucose monitor strips Test three  times a day & as needed for symptoms of irregular blood glucose.  5/8/19   Shanita Ta MD   Lancets MISC 1 each by Does not apply route daily 5/8/19   Shanita Ta MD   pantoprazole sodium (PROTONIX) 40 MG PACK packet Take 40 mg by mouth every morning (before breakfast)    Historical Provider, MD   spironolactone (ALDACTONE) 25 MG tablet Take 1 tablet by mouth daily 11/22/18   Bruna Carpio MD   isosorbide dinitrate (ISORDIL) 20 MG tablet Take 1 tablet by mouth 3 times daily 9/20/18   Rachelle Holbrook MD   hydrALAZINE (APRESOLINE) 25 MG tablet Take 1 tablet by mouth every 8 hours 9/20/18   Rachelle Holbrook MD   Blood Pressure KIT 1 Device by Does not apply route 2 times daily 9/20/18   Joseph Garcia MD   ferrous sulfate 325 (65 Fe) MG EC tablet Take 325 g by mouth 2 times daily (with meals) 4/20/18   Historical Provider, MD   loratadine (CLARITIN) 10 MG tablet Take 10 mg by mouth daily    Historical Provider, MD   tiotropium (SPIRIVA) 18 MCG inhalation capsule Inhale 1 capsule into the lungs  8/31/17   Historical Provider, MD   sertraline (ZOLOFT) 100 MG tablet Take 100 mg by mouth daily    Historical Provider, MD   atorvastatin (LIPITOR) 40 MG tablet Take 1 tablet by mouth nightly 2/21/18   Ninfa Counter, MD   albuterol sulfate  (90 Base) MCG/ACT inhaler Inhale 2 puffs into the lungs every 6 hours as needed for Wheezing    Historical Provider, MD   fluticasone (FLONASE) 50 MCG/ACT nasal spray 1 spray by Nasal route daily     Historical Provider, MD   albuterol (PROVENTIL) (2.5 MG/3ML) 0.083% nebulizer solution Take 3 mLs by nebulization every 6 hours as needed for Wheezing. 9/24/14   Nancy Roach MD   budesonide-formoterol (SYMBICORT) 160-4.5 MCG/ACT AERO Inhale 2 puffs into the lungs 2 times daily. 9/24/14   Adryan Fritz MD       REVIEW OFSYSTEMS    (2-9 systems for level 4, 10 or more for level 5)      Review of Systems   Constitutional: Negative for chills and fever. HENT: Negative for congestion. Eyes: Negative for pain and redness. Respiratory: Positive for shortness of breath and wheezing. Cardiovascular: Negative for chest pain and leg swelling. Gastrointestinal: Positive for nausea. Negative for abdominal pain. Genitourinary: Negative for difficulty urinating and dysuria. Musculoskeletal: Negative for arthralgias, joint swelling, myalgias and neck pain. Skin: Negative for rash and wound. Neurological: Negative for dizziness and headaches. Psychiatric/Behavioral: Negative for behavioral problems and confusion.        PHYSICAL EXAM   (up to 7 for level 4, 8 or more forlevel 5)      INITIAL VITALS:   Vitals: 06/08/22 0635 06/08/22 0642 06/08/22 0732 06/08/22 0736   BP: (!) 129/106 103/62  124/79   Pulse: 100 (!) 101  (!) 102   Resp: 21 20 22 23   Temp:       TempSrc:       SpO2: 95% 94%  90%   Weight:             Physical Exam  Vitals reviewed. Constitutional:       General: She is in acute distress. Appearance: Normal appearance. Comments: Arrives in respiratory distress on CPAP   HENT:      Head: Normocephalic and atraumatic. Right Ear: External ear normal.      Left Ear: External ear normal.      Nose: Nose normal.   Eyes:      General:         Right eye: No discharge. Left eye: No discharge. Extraocular Movements: Extraocular movements intact. Pupils: Pupils are equal, round, and reactive to light. Cardiovascular:      Rate and Rhythm: Regular rhythm. Tachycardia present. Pulmonary:      Comments: Increased work of breathing. Switched over to BiPAP. Coarse breath sounds throughout. Abdominal:      General: There is no distension. Palpations: Abdomen is soft. Tenderness: There is no abdominal tenderness. Musculoskeletal:      Cervical back: No rigidity. Comments: Moving all 4 extremities   Skin:     General: Skin is warm. Capillary Refill: Capillary refill takes less than 2 seconds. Neurological:      General: No focal deficit present. Mental Status: She is alert and oriented to person, place, and time. Cranial Nerves: No cranial nerve deficit.    Psychiatric:         Mood and Affect: Mood normal.         Behavior: Behavior normal.         DIFFERENTIAL  DIAGNOSIS     PLAN (LABS / IMAGING / EKG):  Orders Placed This Encounter   Procedures    XR CHEST PORTABLE    VL DUP LOWER EXTREMITY VENOUS BILATERAL    CBC with Auto Differential    Basic Metabolic Panel w/ Reflex to MG    Troponin    Brain Natriuretic Peptide    Troponin    ELECTROLYTES PLUS    Hemoglobin and hematocrit, blood    CALCIUM, IONIC (POC)    Inpatient consult to Internal Medicine    Respiratory Care Evaluation and Treat    Initiate Oxygen Therapy Protocol    Pulse oximetry, continuous    Adult NIV/Positive Airway Pressure    POC Blood Gas and Chemistry    Venous Blood Gas, POC    Creatinine W/GFR Point of Care    POCT urea (BUN)    Lactic Acid, POC    POCT Glucose    EKG 12 Lead       MEDICATIONS ORDERED:  Orders Placed This Encounter   Medications    methylPREDNISolone sodium (SOLU-MEDROL) injection 125 mg    DISCONTD: magnesium sulfate 2000 mg in 50 mL IVPB premix    magnesium sulfate 2000 mg in 50 mL IVPB premix    ipratropium-albuterol (DUONEB) nebulizer solution 1 ampule     Order Specific Question:   Initiate RT Bronchodilator Protocol     Answer:   No    albuterol (PROVENTIL) nebulizer solution 5 mg     Order Specific Question:   Initiate RT Bronchodilator Protocol     Answer:   No    ondansetron (ZOFRAN) injection 4 mg    nitroGLYCERIN (NITROSTAT) SL tablet 0.4 mg       DDX: CHF exacerbation, COPD exacerbation, ACS, pneumonia, pneumothorax    Initial MDM/Plan: 55 y.o. female who presents with respiratory distress. Arrives on CPAP. Initially was satting in the 46s for EMS, improved to the 80s on CPAP. Appears in distress, tachycardic, afebrile, hypoxic, tachypneic. Switched to BiPAP on arrival.  Will obtain laboratory work-up including VBG. Will dose with magnesium and Solu-Medrol. Will obtain chest x-ray and EKG. We will respiratory give breathing treatments. Anticipate admission.     DIAGNOSTIC RESULTS / EMERGENCYDEPARTMENT COURSE / MDM     LABS:  Labs Reviewed   CBC WITH AUTO DIFFERENTIAL - Abnormal; Notable for the following components:       Result Value    RBC 3.90 (*)     Hemoglobin 10.5 (*)     Hematocrit 35.7 (*)     RDW 14.7 (*)     Seg Neutrophils 85 (*)     Lymphocytes 8 (*)     Eosinophils % 0 (*)     Immature Granulocytes 1 (*)     Segs Absolute 8.56 (*)     Absolute Lymph # 0.79 (*)     All other components within normal limits EKG    Jose Lizama DO      All EKG's are interpreted by the Emergency Department Physicianwho either signs or Co-signs this chart in the absence of a cardiologist.    EMERGENCY DEPARTMENT COURSE:  ED Course as of 06/08/22 0814   Wed Jun 08, 2022   0705 Pro-BNP(!): 4,082  Similar 2 months ago [AB]   0706 Troponin, High Sensitivity: 12  Appears at baseline, will trend [AB]   0706 WBC: 10.1 [AB]   0706 Hemoglobin Quant(!): 10.5  At baseline [AB]   0706 pCO2, Zaid(!): 60.6 [AB]   0710 Medicine consulted for admission [AB]   2227 [place] r/o PE as patient would not tolerate laying flat for CT scan at this time, will discuss with admitting team to see if they would like full dose Lovenox given [AB]   0731 Patient improving on BiPAP [AB]   0756 XR CHEST PORTABLE  IMPRESSION:  Cardiomegaly mild vascular congestion similar to prior. [AB]   0813 Spoke to internal medicine. They would like to hold off on giving Lovenox at this time. They requesting to obtain bilateral venous Dopplers. If there are any signs of clots on this they will treat with Lovenox but otherwise we will hold off at this time. They agree to admit patient. [AB]      ED Course User Index  [AB] Genie Wise DO          PROCEDURES:  None    CONSULTS:  IP CONSULT TO INTERNAL MEDICINE    CRITICAL CARE:  See attending physician note    FINAL IMPRESSION      1. Respiratory distress          DISPOSITION / PLAN     DISPOSITION Decision To Admit 06/08/2022 07:09:36 AM      PATIENT REFERRED TO:  No follow-up provider specified.     DISCHARGE MEDICATIONS:  New Prescriptions    No medications on file       Jose Lizama DO  Emergency Medicine Resident    (Please note that portions of this note were completed with a voice recognition program.Efforts were made to edit the dictations but occasionally words are mis-transcribed.)        Genie Wise DO  Resident  06/08/22 0710

## 2022-06-08 NOTE — H&P
Critical Care - History and Physical Examination    Patient's name:  Paige [address] Record Number: [id number]  Patient's account/billing number: [de-identified]  Patient's YOB: 1975  Age: 55 y.o. Date of Admission: 6/8/2022  6:12 AM  Date of History and Physical Examination: 6/8/2022      Primary Care Physician: Carmen Betts DO  Attending Physician: Dr. Amita Zhong    Code Status: Full Code    Chief complaint:   Chief Complaint   Patient presents with    Respiratory Distress         HISTORY OF PRESENT ILLNESS:      History was obtained from chart review and the patient. Mathew Light is a 55 y.o. with PMH of CHF, morbid obesity, presented to the ER with dyspnea, tested positive for COVID presenting in acute respiratory distress. Most recent echocardiogram completed in March 18, 2022 significant for severe pulmonary hypertension with right ventricular systolic pressure of 88 mmHg. Patient has a trilogy respirator at home, normally saturates between 88 and 92%. Presented to the ER saturating to the low 80s. The patient did not have altered mental status, she was wheezing in the ER, admitted to medicine, subsequently transferred to the ICU for persistent respiratory distress, requiring maximum BiPAP settings 100% FiO2. Patient has a persistent PCO2 around 60-70. Upon arrival to the ICU, patient was maintained on BiPAP settings and saturating around 95 to 97%. Patient was mentating appropriately, she did not have any acute pain.             PAST MEDICAL HISTORY:         Diagnosis Date    Acute on chronic diastolic CHF (congestive heart failure) ([address] 09/15/2018    HIEN (acute kidney injury) ([address] 05/06/2019    HIEN (acute kidney injury) ([address] 11/20/2018    Asthma     CHF     Chronic obstructive lung disease (HCC)     Chronic respiratory failure with hypoxia ([address]     on home O2 therapy    COPD     Diabetes mellitus, new onset ([address] 05/06/2019    Former smoker     quit smoking about 17 months ago/ She has a 22.00 pack-year smoking history    HTN     Hyperglycemia     Hyperlipidemia     Hypertension     Morbid obesity with BMI of 60.0-69.9, adult (HCC)     Neuromuscular disorder (HCC)     Neuropathy Right hand    STEPH on CPAP     DME per Dr. Malinda Mckenzie for CPAP of 18 cmh2o    Oxygen dependent     DME 06/2018 for home oxgyen at 2.5-3 lpm ATC    Pedal edema     Pneumonia     Pulmonary hypertension, moderate to severe ([address] 06/09/2018    Torn meniscus     Wears glasses          PAST SURGICAL HISTORY:         Procedure Laterality Date    ABDOMEN SURGERY      Patient had a PEG tube placed 1/2018, removed 4/2018    [id number]  June 5, 2019    CYSTOSCOPY N/A 6/5/2019    CYSTOSCOPY performed by Benita Merida MD at [address]  02/2018    Removed in April 2018    [facility]. CATH POWER PICC TRIPLE  2/2/2018         JOINT REPLACEMENT      IA OFFICE/OUTPT VISIT,PROCEDURE ONLY N/A 2/17/2018    TRACHEOTOMY performed by Yashira Keys MD at [address]  03/2018    TRACHEOTOMY  02/2018       ALLERGIES:      Allergies   Allergen Reactions    Bee Venom Anaphylaxis     Last bee sting when patient was at 11years of age   Zannie Cowden Beta Adrenergic Blockers Other (See Comments)     UNKNOWN      Sulfa Antibiotics Anaphylaxis    Asa [Aspirin]     Aspirin Other (See Comments)     HEART PALPATATIONS      Beta Adrenergic Blockers Other (See Comments)     Asystole and Bradycardia on admission  01/26/2018-2/22/2018 at [address] [place] [address] MEDS: :      Prior to Admission medications    Medication Sig Start Date End Date Taking? Authorizing Provider   gabapentin (NEURONTIN) 300 MG capsule Take 1 capsule by mouth 3 times daily for 10 days.  4/13/22 4/23/22  Abhishek Webb MD   amLODIPine (NORVASC) 5 MG tablet Take 1 tablet by mouth daily 3/28/22   Trenton Keating MD   diazePAM (VALIUM) 5 MG tablet Take 5 mg by mouth every 12 hours as needed for Anxiety. Historical Provider, MD   folic acid (FOLVITE) 1 MG tablet Take 1 mg by mouth daily    Historical Provider, MD   vitamin B-12 (CYANOCOBALAMIN) 1000 MCG tablet Take 1,000 mcg by mouth daily    Historical Provider, MD   Dulaglutide (TRULICITY) 5.60 VM/0.2TN SOPN Inject 0.75 mg into the skin once a week    Historical Provider, MD   metOLazone (ZAROXOLYN) 2.5 MG tablet Take 2.5 mg by mouth every other day    Historical Provider, MD   glipiZIDE (GLUCOTROL XL) 2.5 MG extended release tablet Take 2.5 mg by mouth  Patient not taking: Reported on 4/9/2022    Historical Provider, MD   SM MUCUS RELIEF 600 MG extended release tablet  10/15/19   Historical Provider, MD   nystatin (MYCOSTATIN) 159063 UNIT/GM cream  10/17/19   Historical Provider, MD   Ergocalciferol (VITAMIN D2 PO) Take 50,000 Units by mouth once a week  Patient not taking: Reported on 3/18/2022    Historical Provider, MD   potassium chloride (KLOR-CON M) 10 MEQ extended release tablet Take 1 tablet by mouth daily 7/25/19   Jameel Figueredo MD   bumetanide (BUMEX) 1 MG tablet Take 2 mg by mouth 2 times daily    Historical Provider, MD   JANUVIA 50 MG tablet Take 50 mg by mouth daily 6/11/19   Historical Provider, MD   glucose monitoring kit (FREESTYLE) monitoring kit 1 kit by Does not apply route daily 5/8/19   Michael Kwan MD   blood glucose monitor strips Test three  times a day & as needed for symptoms of irregular blood glucose.  5/8/19   Michael Kwan MD   Lancets MISC 1 each by Does not apply route daily 5/8/19   Michael Kwan MD   pantoprazole sodium (PROTONIX) 40 MG PACK packet Take 40 mg by mouth every morning (before breakfast)    Historical Provider, MD   spironolactone (ALDACTONE) 25 MG tablet Take 1 tablet by mouth daily 11/22/18   Darryle So, MD   isosorbide dinitrate (ISORDIL) 20 MG tablet Take 1 tablet by mouth 3 times daily 9/20/18   Alessandra Be MD   hydrALAZINE (APRESOLINE) 25 MG tablet Take 1 tablet by mouth every 8 hours 9/20/18   Rubye Kawasaki, MD   Blood Pressure KIT 1 Device by Does not apply route 2 times daily 9/20/18   Joseph Garcia MD   ferrous sulfate 325 (65 Fe) MG EC tablet Take 325 g by mouth 2 times daily (with meals) 4/20/18   Historical Provider, MD   loratadine (CLARITIN) 10 MG tablet Take 10 mg by mouth daily    Historical Provider, MD   tiotropium (SPIRIVA) 18 MCG inhalation capsule Inhale 1 capsule into the lungs  8/31/17   Historical Provider, MD   sertraline (ZOLOFT) 100 MG tablet Take 100 mg by mouth daily    Historical Provider, MD   atorvastatin (LIPITOR) 40 MG tablet Take 1 tablet by mouth nightly 2/21/18   Andria Singh MD   albuterol sulfate  (90 Base) MCG/ACT inhaler Inhale 2 puffs into the lungs every 6 hours as needed for Wheezing    Historical Provider, MD   fluticasone (FLONASE) 50 MCG/ACT nasal spray 1 spray by Nasal route daily     Historical Provider, MD   albuterol (PROVENTIL) (2.5 MG/3ML) 0.083% nebulizer solution Take 3 mLs by nebulization every 6 hours as needed for Wheezing. 9/24/14   Corie Olson MD   budesonide-formoterol (SYMBICORT) 160-4.5 MCG/ACT AERO Inhale 2 puffs into the lungs 2 times daily. 9/24/14   Naomy Gooden MD       SOCIAL HISTORY:       TOBACCO:   reports that she quit smoking about 4 years ago. Her smoking use included cigarettes. She started smoking about 27 years ago. She has a 22.00 pack-year smoking history. She has never used smokeless tobacco.  ETOH:   reports no history of alcohol use. DRUGS:  reports no history of drug use.      FAMILY HISTORY:          Problem Relation Age of Onset    Emphysema Mother    [place] Alzheimer's Disease Mother     Other Mother         Blood disorder    High Blood Pressure Father     Arthritis Father     Diabetes Sister     Heart Failure Sister     Kidney Disease Sister     High Blood Pressure Brother     Dementia Maternal Grandmother     High Blood Pressure Maternal Grandmother     Dementia Maternal Grandfather     High Blood Pressure Maternal Grandfather     Cancer Paternal Grandmother     Heart Disease Paternal Grandfather     Diabetes Sister     Heart Failure Sister     Other Sister         Intestinal problems    No Known Problems Sister     No Known Problems Son        REVIEW OF SYSTEMS (ROS):     Review of Systems   Constitutional: Positive for activity change. Negative for fever. HENT: Negative for facial swelling, sneezing and trouble swallowing. Eyes: Negative for redness. Respiratory: Positive for cough, chest tightness and shortness of breath. Gastrointestinal: Negative for vomiting. Endocrine: Negative for polyphagia. Genitourinary: Negative for hematuria. Musculoskeletal: Negative for arthralgias. Skin: Negative for color change. Neurological: Negative for seizures and speech difficulty. Psychiatric/Behavioral: Negative for agitation.          OBJECTIVE:     VITAL SIGNS:  /75   Pulse 93   Temp 97.9 °F (36.6 °C) (Axillary)   Resp 21   Ht (!) 5\" (0.127 m)   Wt (!) 343 lb 7.6 oz (155.8 kg)   SpO2 (!) 86%   BMI 9659.62 kg/m²   Tmax over 24 hours:  Temp (24hrs), Av °F (36.7 °C), Min:97.9 °F (36.6 °C), Max:98 °F (36.7 °C)      Patient Vitals for the past 8 hrs:   BP Temp Temp src Pulse Resp SpO2 Height Weight   22 1559       (!) 5\" (0.127 m)    22 1530        (!) 343 lb 7.6 oz (155.8 kg)   22 1504    93 21 (!) 86 %     22 1220 122/75 97.9 °F (36.6 °C) Axillary 94 19 90 %     22 1110     17      22 1109    95 14 (!) 86 %     22 1033     22      22 1001    94 20 (!) 89 %     22 0955    94 21 (!) 89 %     22 0953    96 20 93 %     22 0940    95 21 90 %     22 0817 138/83   (!) 102 20 91 %         No intake or output data in the 24 hours ending 22 1601    Wt Readings from Last 3 Encounters: 06/08/22 (!) 343 lb 7.6 oz (155.8 kg)   04/20/22 (!) 334 lb 3.5 oz (151.6 kg)   03/19/22 (!) 355 lb (161 kg)     Body mass index is 9,659.62 kg/m². PHYSICAL EXAM:  Physical Exam  Constitutional:       General: She is in acute distress. Appearance: Normal appearance. She is obese. She is ill-appearing. She is not toxic-appearing. HENT:      Head: Normocephalic and atraumatic. Nose: No congestion. Mouth/Throat:      Mouth: Mucous membranes are moist.   Eyes:      Conjunctiva/sclera: Conjunctivae normal.   Neck:     Cardiovascular:      Rate and Rhythm: Regular rhythm. Tachycardia present. Pulses: Normal pulses. Heart sounds: Normal heart sounds. No murmur heard. No gallop. Pulmonary:      Effort: Respiratory distress present. Breath sounds: No stridor. Wheezing present. No rhonchi. Chest:      Chest wall: No tenderness. Abdominal:      General: There is no distension. Palpations: There is no mass. Tenderness: There is no abdominal tenderness. There is no guarding or rebound. Musculoskeletal:         General: Swelling present. No tenderness or deformity. Right lower leg: Edema present. Left lower leg: Edema present. Skin:     General: Skin is warm. Coloration: Skin is not jaundiced. Findings: No bruising. Neurological:      General: No focal deficit present. Mental Status: She is alert.            MEDICATIONS:  Scheduled Meds:   sodium chloride flush  5-40 mL IntraVENous 2 times per day    famotidine (PEPCID) injection  20 mg IntraVENous BID    furosemide  60 mg IntraVENous BID    [START ON 6/9/2022] methylPREDNISolone  60 mg IntraVENous Daily    insulin lispro  0-12 Units SubCUTAneous TID     insulin lispro  0-6 Units SubCUTAneous Nightly    ipratropium-albuterol  1 ampule Inhalation 4x daily    cefTRIAXone (ROCEPHIN) IV  1,000 mg IntraVENous Q24H    heparin (porcine)  5,000 Units SubCUTAneous 3 times per day    succinylcholine        etomidate         Continuous Infusions:   sodium chloride      dextrose       PRN Meds:   albuterol, 5 mg, Q6H PRN  sodium chloride flush, 5-40 mL, PRN  sodium chloride, , PRN  ondansetron, 4 mg, Q8H PRN   Or  ondansetron, 4 mg, Q6H PRN  polyethylene glycol, 17 g, Daily PRN  acetaminophen, 650 mg, Q6H PRN   Or  acetaminophen, 650 mg, Q6H PRN  potassium chloride, 40 mEq, PRN   Or  potassium alternative oral replacement, 40 mEq, PRN   Or  potassium chloride, 10 mEq, PRN  potassium chloride, 10 mEq, PRN  magnesium sulfate, 2,000 mg, PRN  glucose, 4 tablet, PRN  dextrose bolus, 125 mL, PRN   Or  dextrose bolus, 250 mL, PRN  glucagon (rDNA), 1 mg, PRN  dextrose, 100 mL/hr, PRN          ABGs:   Lab Results   Component Value Date    VIF5SPB 40 02/22/2019    FIO2 INFORMATION NOT PROVIDED 03/18/2022       DATA:  Complete Blood Count:   Recent Labs     06/08/22 0627   WBC 10.1   RBC 3.90*   HGB 10.5*   HCT 35.7*   MCV 91.5   MCH 26.9   MCHC 29.4   RDW 14.7*      MPV 10.8        Last 3 Blood Glucose:   Recent Labs     06/08/22 0627   GLUCOSE 180*        PT/INR:    Lab Results   Component Value Date    PROTIME 10.6 06/29/2021    INR 1.0 06/29/2021     PTT:    Lab Results   Component Value Date    APTT 20.1 06/29/2021       Comprehensive Metabolic Profile:   Recent Labs     06/08/22  0626 06/08/22  0627   NA  --  139   K  --  3.8   CL  --  94*   CO2  --  30   BUN  --  16   CREATININE 0.89 0.87   GLUCOSE  --  180*   CALCIUM  --  9.2      Magnesium:   Lab Results   Component Value Date    MG 1.8 03/27/2022    MG 1.6 03/25/2022    MG 1.5 03/23/2022     Phosphorus:   Lab Results   Component Value Date    PHOS 2.4 02/22/2019    PHOS 3.8 02/24/2018    PHOS 2.6 02/22/2018     Ionized Calcium:   Lab Results   Component Value Date    CAION 1.05 02/22/2019    CAION 1.11 02/22/2018    CAION 1.15 02/06/2018        Urinalysis:   Lab Results   Component Value Date    NITRU NEGATIVE 03/27/2022    COLORU Yellow 03/27/2022    PHUR 7.5 03/27/2022    WBCUA 2 TO 5 03/27/2022    RBCUA 50  03/27/2022    MUCUS NOT REPORTED 05/08/2019    TRICHOMONAS NOT REPORTED 05/08/2019    YEAST FEW 03/27/2022    BACTERIA FEW 05/08/2019    SPECGRAV 1.009 03/27/2022    LEUKOCYTESUR SMALL 03/27/2022    UROBILINOGEN Normal 03/27/2022    BILIRUBINUR NEGATIVE 03/27/2022    GLUCOSEU NEGATIVE 03/27/2022    KETUA NEGATIVE 03/27/2022    AMORPHOUS NOT REPORTED 05/08/2019       HgBA1c:    Lab Results   Component Value Date    LABA1C 6.8 06/08/2022     TSH:    Lab Results   Component Value Date    TSH 2.36 02/23/2019       Lactic Acid: No results found for: LACTA   Troponin: No results for input(s): TROPONINI in the last 72 hours. Last Echocardiogram findings:   3/18/2022    CONCLUSIONS     Summary  Left ventricle is normal in size. Global left ventricular systolic function is normal. Calculated ejection  fraction 60% by Heart Model. Marked Leftward compression of inter-ventricular septum (\"D-sign\")  indicating RV volume or pressure overload. Right ventricular function appears reduced. Moderately dilated right  ventricular cavity. Moderate to severe tricuspid regurgitation. Severe pulmonary hypertension. Estimated right ventricular systolic pressure  is 21CWBB. Trivial pulmonic insufficiency. Radiological imaging  XR CHEST PORTABLE    Result Date: 6/8/2022  EXAMINATION: ONE XRAY VIEW OF THE CHEST 6/8/2022 7:04 am COMPARISON: 04/10/2022 HISTORY: ORDERING SYSTEM PROVIDED HISTORY: shortness of breath TECHNOLOGIST PROVIDED HISTORY: shortness of breath FINDINGS: Cardiomegaly unchanged. No mediastinal widening. Low lung volumes. Mild vascular congestion. No discrete area of consolidation. No pleural effusion or pneumothorax. Bones grossly intact. Cardiomegaly mild vascular congestion similar to prior.        ASSESSMENT:     Principal Problem:    Acute respiratory failure with hypoxia and hypercapnia (HCC)  Active Problems:    Acute on chronic diastolic congestive heart failure (HCC)    Morbid obesity (HCC)    COPD exacerbation (HCC)    Pulmonary hypertension, moderate to severe ([facility]    Morbid obesity with BMI of 50.0-59.9, adult ([facility])    Obesity hypoventilation syndrome (HCC)    STEPH (obstructive sleep apnea)    Type 2 diabetes mellitus ([facility]    Prediabetes  Resolved Problems:    * No resolved hospital problems. *      PLAN:     ICU PROPHYLAXIS:  Stress ulcer:  [] PPI Agent  [x] [id number] [] Sucralfate  [] Other:  VTE:   [x] Enoxaparin  [] Unfract. Heparin Subcut  [] EPC Cuffs    NUTRITION:  [] NPO  [] Tube Feeding (Specify: ) [] TPN  [x] PO    CONSULTATION NEEDED:  [] No   [x] Yes     HOME MEDICATIONS RECONCILED: [] No  [x] Yes    FAMILY UPDATED:    [x] No   [] Yes     ADDITIONAL PLAN:    Neuro:    -Patient is alert and oriented x4, she is mentating appropriately, not altered at time of evaluation, for 4:33 PM 6/8/2022. Cardiovascular:    -History of CHF, lower extremity edema. Lasix 60 mg IV twice daily   -Nitroglycerin 0.4 mg for chest pain as needed    Pulmonary:   - Patient has history of severe pulmonary hypertension noted on echocardiogram, acute respiratory distress secondary to CHF exacerbation.   -BiPAP on max settings, wean down as tolerated   -Low threshold for intubation, intubate if altered mental status and/or desaturations refractory to max BiPAP settings. Currently satting 95%. -Albuterol/breathing treatments as needed.   -solumedrol daily    GI:   -Prophylaxis with Pepcid   -Regular diet     Renal:   -Patient currently on Lasix for CHF. Continue to monitor electrolytes with a.m. labs. Electrolytes currently normal, replete as needed.     Heme:   Heparin      Infectious disease:   -On Rocephin for COPD exacerbation      Endocrine:    -Sliding scale insulin    Dispo: Remain in ICU        Cornelia Cuevas DO  Emergency Medicine Resident  [address] [facility]  6/8/2022 4:01 PM      The critical care team assigned to the patient will be following up the patient in the intensive care unit. I have discussed the current plan with the critical care attending. The above mentioned assessment and plan will be reviewed again in detail by the critical care attending at bedside, and can be further changed or modified accordingly by the attending physician.

## 2022-06-08 NOTE — PLAN OF CARE
Problem: Respiratory - Adult  Intervention: Monitor non-invasive mechanical ventilation  Note: NON INVASIVE VENTILATION  PROVIDE OPTIMAL VENTILATION/ACCEPTABLE SP02  IMPLEMENT NON INVASIVE VENTILATION PROTOCOL  ASSESSMENT SKIN INTEGRITY  PATIENT EDUCATION AS NEEDED  BIPAP AS NEEDED

## 2022-06-09 NOTE — PROGRESS NOTES
INTENSIVE CARE UNIT  Resident Physician Progress Note    Patient - Alberto Shane  Date of Admission -  2022  6:12 AM  Date of Evaluation -  2022  Room and Bed Number -  3001/3001-01   Hospital Day - 1    SUBJECTIVE:     HISTORY OF PRESENT ILLNESS:    20-year-old female admitted for hypoxic respiratory failure requiring maximum BiPAP settings, 100% FiO2 secondary to COVID induced respiratory failure with acute on chronic CHF and/or COPD exacerbation with pulmonary hypertension. Patient is treating treated with antibiotics, diuretics, BiPAP, breathing treatments. OVERNIGHT EVENTS:      Patient required continuous BiPAP overnight, however is feeling better today. Patient still in moderate discomfort        OBJECTIVE:     VITAL SIGNS:  Patient Vitals for the past 8 hrs:   BP Temp Temp src Pulse Resp SpO2   22 0600 120/77 98.2 °F (36.8 °C) Axillary 73 14 93 %   22 0500 123/69   69 17 98 %   22 0400 121/76 98.1 °F (36.7 °C) Axillary 71 15 99 %   22 0313     24    22 0200 119/73 98.2 °F (36.8 °C) Axillary 79 16 98 %   22 0151     22    22 0100 108/72   84 (!) 9 92 %   22 0000 114/69 98.1 °F (36.7 °C) Axillary 88 14 90 %   22 2328    86 12 90 %       Last Body weight:   Wt Readings from Last 3 Encounters:   22 (!) 334 lb 14.1 oz (151.9 kg)   22 (!) 334 lb 3.5 oz (151.6 kg)   22 (!) 355 lb (161 kg)       Body Mass Index : Body mass index is 9,417.82 kg/m². Tmax over 24 hours: Temp (24hrs), Av.3 °F (36.8 °C), Min:97.9 °F (36.6 °C), Max:98.7 °F (37.1 °C)      Ins/Outs: In: 39   Out: 800 [Urine:800]    PHYSICAL EXAM:  Constitutional: Moderate distress, morbidly obese  EENT: On BiPAP, trach scar present  Neck: Trach scar present, short neck  Respiratory: clear to auscultation, no wheezes or rales and unlabored breathing.  No intercostal tenderness  Cardiovascular: Large P waves on telemetry, consistent with severe pulmonary hypertension. Regular rate  Abdomen: Obese, nontender  Extremities: Bilateral lower extremity edema  MEDICATIONS:  Scheduled Meds:   sodium chloride flush  5-40 mL IntraVENous 2 times per day    famotidine (PEPCID) injection  20 mg IntraVENous BID    furosemide  60 mg IntraVENous BID    insulin lispro  0-12 Units SubCUTAneous TID WC    insulin lispro  0-6 Units SubCUTAneous Nightly    ipratropium-albuterol  1 ampule Inhalation 4x daily    cefTRIAXone (ROCEPHIN) IV  1,000 mg IntraVENous Q24H    heparin (porcine)  5,000 Units SubCUTAneous 3 times per day    dexamethasone  10 mg IntraVENous Q24H    guaiFENesin  600 mg Oral BID       Continuous Infusions:   sodium chloride      dextrose         PRN Meds:   albuterol, 5 mg, Q6H PRN  sodium chloride flush, 5-40 mL, PRN  sodium chloride, , PRN  ondansetron, 4 mg, Q8H PRN   Or  ondansetron, 4 mg, Q6H PRN  polyethylene glycol, 17 g, Daily PRN  acetaminophen, 650 mg, Q6H PRN   Or  acetaminophen, 650 mg, Q6H PRN  potassium chloride, 40 mEq, PRN   Or  potassium alternative oral replacement, 40 mEq, PRN   Or  potassium chloride, 10 mEq, PRN  potassium chloride, 10 mEq, PRN  magnesium sulfate, 2,000 mg, PRN  glucose, 4 tablet, PRN  dextrose bolus, 125 mL, PRN   Or  dextrose bolus, 250 mL, PRN  glucagon (rDNA), 1 mg, PRN  dextrose, 100 mL/hr, PRN  morphine, 1 mg, Q4H PRN        SUPPORT DEVICES: [] Ventilator [x] BIPAP  [] Nasal Cannula [] Room Air       Additional Respiratory Assessments  Heart Rate: 73  Resp: 14  SpO2: 93 %  Humidification Source: Heated wire  Humidification Temp: 33  Lab Results   Component Value Date    MODE NOT REPORTED 02/22/2019       ABGs:   Arterial Blood Gas result:  pH 7.316; pCO2 70.2 pO2 34.8 ; HCO3 34.8;; %O2 Sat 95%.       DATA:  Complete Blood Count:   Recent Labs     06/08/22  0627   WBC 10.1   RBC 3.90*   HGB 10.5*   HCT 35.7*   MCV 91.5   MCH 26.9   MCHC 29.4   RDW 14.7*      MPV 10.8        Last 3 Blood Glucose: Recent Labs     06/08/22 0627   GLUCOSE 180*        PT/INR:    Lab Results   Component Value Date    PROTIME 10.6 06/29/2021    INR 1.0 06/29/2021     PTT:    Lab Results   Component Value Date    APTT 20.1 06/29/2021       Basic Metabolic Profile:   Recent Labs     06/08/22  0626 06/08/22 0627   NA  --  139   K  --  3.8   CL  --  94*   CO2  --  30   BUN  --  16   CREATININE 0.89 0.87   GLUCOSE  --  180*       Liver Function:  No results for input(s): PROT, LABALBU, ALT, AST, GGT, ALKPHOS, BILITOT in the last 72 hours. Magnesium:   Lab Results   Component Value Date    MG 1.8 03/27/2022    MG 1.6 03/25/2022    MG 1.5 03/23/2022     Phosphorus:   Lab Results   Component Value Date    PHOS 2.4 02/22/2019    PHOS 3.8 02/24/2018    PHOS 2.6 02/22/2018     Ionized Calcium:   Lab Results   Component Value Date    CAION 1.05 02/22/2019    CAION 1.11 02/22/2018    CAION 1.15 02/06/2018        Urinalysis:   Lab Results   Component Value Date    NITRU NEGATIVE 03/27/2022    COLORU Yellow 03/27/2022    PHUR 7.5 03/27/2022    WBCUA 2 TO 5 03/27/2022    RBCUA 50  03/27/2022    MUCUS NOT REPORTED 05/08/2019    TRICHOMONAS NOT REPORTED 05/08/2019    YEAST FEW 03/27/2022    BACTERIA FEW 05/08/2019    SPECGRAV 1.009 03/27/2022    LEUKOCYTESUR SMALL 03/27/2022    UROBILINOGEN Normal 03/27/2022    BILIRUBINUR NEGATIVE 03/27/2022    GLUCOSEU NEGATIVE 03/27/2022    KETUA NEGATIVE 03/27/2022    AMORPHOUS NOT REPORTED 05/08/2019       HgBA1c:    Lab Results   Component Value Date    LABA1C 6.8 06/08/2022     TSH:    Lab Results   Component Value Date    TSH 2.36 02/23/2019     Lactic Acid: No results found for: LACTA   Troponin: No results for input(s): TROPONINI in the last 72 hours.     Other Labs:  Results for orders placed or performed during the hospital encounter of 06/08/22   Respiratory Panel, Molecular, with COVID-19 (Restricted: peds pts or suitable admitted adults)    Specimen: Nasopharyngeal Swab   Result Value Ref Range    Specimen Description . NASOPHARYNGEAL SWAB     Adenovirus PCR Not Detected Not Detected    Coronavirus 229E PCR Not Detected Not Detected    Coronavirus HKU1 PCR Not Detected Not Detected    Coronavirus NL63 PCR Not Detected Not Detected    Coronavirus OC43 PCR Not Detected Not Detected    SARS-CoV-2, PCR DETECTED (A) Not Detected    Human Metapneumovirus PCR Not Detected Not Detected    Rhino/Enterovirus PCR Not Detected Not Detected    Influenza A by PCR Not Detected Not Detected    Influenza B by PCR Not Detected Not Detected    Parainfluenza 1 PCR Not Detected Not Detected    Parainfluenza 2 PCR Not Detected Not Detected    Parainfluenza 3 PCR Not Detected Not Detected    Parainfluenza 4 PCR Not Detected Not Detected    Resp Syncytial Virus PCR Not Detected Not Detected    Bordetella Parapertussis Not Detected Not Detected    B Pertussis by PCR Not Detected Not Detected    Chlamydia pneumoniae By PCR Not Detected Not Detected    Mycoplasma pneumo by PCR Not Detected Not Detected   CBC with Auto Differential   Result Value Ref Range    WBC 10.1 3.5 - 11.3 k/uL    RBC 3.90 (L) 3.95 - 5.11 m/uL    Hemoglobin 10.5 (L) 11.9 - 15.1 g/dL    Hematocrit 35.7 (L) 36.3 - 47.1 %    MCV 91.5 82.6 - 102.9 fL    MCH 26.9 25.2 - 33.5 pg    MCHC 29.4 28.4 - 34.8 g/dL    RDW 14.7 (H) 11.8 - 14.4 %    Platelets 059 715 - 705 k/uL    MPV 10.8 8.1 - 13.5 fL    NRBC Automated 0.0 0.0 per 100 WBC    Seg Neutrophils 85 (H) 36 - 65 %    Lymphocytes 8 (L) 24 - 43 %    Monocytes 6 3 - 12 %    Eosinophils % 0 (L) 1 - 4 %    Basophils 0 0 - 2 %    Immature Granulocytes 1 (H) 0 %    Segs Absolute 8.56 (H) 1.50 - 8.10 k/uL    Absolute Lymph # 0.79 (L) 1.10 - 3.70 k/uL    Absolute Mono # 0.63 0.10 - 1.20 k/uL    Absolute Eos # <0.03 0.00 - 0.44 k/uL    Basophils Absolute <0.03 0.00 - 0.20 k/uL    Absolute Immature Granulocyte 0.09 0.00 - 0.30 k/uL    RBC Morphology ANISOCYTOSIS PRESENT    Basic Metabolic Panel w/ Reflex to MG   Result Value Ref Range    Glucose 180 (H) 70 - 99 mg/dL    BUN 16 6 - 20 mg/dL    CREATININE 0.87 0.50 - 0.90 mg/dL    Calcium 9.2 8.6 - 10.4 mg/dL    Sodium 139 135 - 144 mmol/L    Potassium 3.8 3.7 - 5.3 mmol/L    Chloride 94 (L) 98 - 107 mmol/L    CO2 30 20 - 31 mmol/L    Anion Gap 15 9 - 17 mmol/L    GFR Non-African American >60 >60 mL/min    GFR African American >60 >60 mL/min    GFR Comment         Troponin   Result Value Ref Range    Troponin, High Sensitivity 12 0 - 14 ng/L   Brain Natriuretic Peptide   Result Value Ref Range    Pro-BNP 4,082 (H) <300 pg/mL   Troponin   Result Value Ref Range    Troponin, High Sensitivity 13 0 - 14 ng/L   ELECTROLYTES PLUS   Result Value Ref Range    POC Sodium 141 138 - 146 mmol/L    POC Potassium 3.6 3.5 - 4.5 mmol/L    POC Chloride 100 98 - 107 mmol/L    POC TCO2 35 (H) 22 - 30 mmol/L    Anion Gap 7 7 - 16 mmol/L   Hemoglobin and hematocrit, blood   Result Value Ref Range    POC Hemoglobin 14.0 12.0 - 16.0 g/dL    POC Hematocrit 41 36 - 46 %   CALCIUM, IONIC (POC)   Result Value Ref Range    POC Ionized Calcium 1.12 (L) 1.15 - 1.33 mmol/L   Hemoglobin A1C   Result Value Ref Range    Hemoglobin A1C 6.8 (H) 4.0 - 6.0 %    Estimated Avg Glucose 148 mg/dL   Procalcitonin   Result Value Ref Range    Procalcitonin 0.19 (H) <0.09 ng/mL   BLOOD GAS, VENOUS   Result Value Ref Range    pH, Zaid 7.316 (L) 7.320 - 7.420    pCO2, Zaid 70.2 (H) 39 - 55    pO2, Zaid 34.8 30 - 50    HCO3, Venous 34.8 (H) 24 - 30 mmol/L    Positive Base Excess, Zaid 7.3 (H) 0.0 - 2.0 mmol/L    O2 Sat, Zaid 59.1 (L) 60.0 - 85.0 %    Carboxyhemoglobin 1.4 0 - 5 %    Pt Temp 37.0     FIO2 UNKNOWN    Venous Blood Gas, POC   Result Value Ref Range    pH, Zaid 7.361 7.320 - 7.430    pCO2, Zaid 60.6 (H) 41.0 - 51.0 mm Hg    pO2, Zaid 34.8 30.0 - 50.0 mm Hg    HCO3, Venous 34.3 (H) 22.0 - 29.0 mmol/L    Positive Base Excess, Zaid 7 (H) 0.0 - 3.0    O2 Sat, Zaid 62 60.0 - 85.0 %   Creatinine W/GFR Point of Care   Result Value Ref Range    POC Creatinine 0.89 0.51 - 1.19 mg/dL    GFR Comment >60 >60 mL/min    GFR Non-African American >60 >60 mL/min    GFR Comment         POCT urea (BUN)   Result Value Ref Range    POC BUN 16 8 - 26 mg/dL   Lactic Acid, POC   Result Value Ref Range    POC Lactic Acid 1.08 0.56 - 1.39 mmol/L   POCT Glucose   Result Value Ref Range    POC Glucose 191 (H) 74 - 100 mg/dL   POC Glucose Fingerstick   Result Value Ref Range    POC Glucose 199 (H) 65 - 105 mg/dL   POC Glucose Fingerstick   Result Value Ref Range    POC Glucose 211 (H) 65 - 105 mg/dL   POC Glucose Fingerstick   Result Value Ref Range    POC Glucose 198 (H) 65 - 105 mg/dL   EKG 12 Lead   Result Value Ref Range    Ventricular Rate 99 BPM    Atrial Rate 99 BPM    P-R Interval 148 ms    QRS Duration 84 ms    Q-T Interval 360 ms    QTc Calculation (Bazett) 462 ms    P Axis 46 degrees    R Axis 53 degrees    T Axis -17 degrees       Radiology/Imaging:  VL DUP LOWER EXTREMITY VENOUS BILATERAL   Final Result      XR CHEST PORTABLE   Final Result   Cardiomegaly mild vascular congestion similar to prior.              ASSESSMENT:     Patient Active Problem List    Diagnosis Date Noted    COVID-19 virus infection 06/08/2022    Acute on chronic diastolic congestive heart failure ([address] 06/08/2022    Respiratory distress     Diarrhea 04/19/2022    Cor pulmonale, chronic ([address] 04/17/2022    CHF (congestive heart failure), NYHA class I, acute on chronic, combined ([address] 04/08/2022    Prediabetes 03/19/2022    Hyperlipidemia 03/19/2022    Hypokalemia 03/19/2022    Hypomagnesemia 03/19/2022    Dyspnea 03/18/2022    Type 2 diabetes mellitus ([address] 05/06/2019    Dizziness 05/06/2019    Normocytic anemia 05/06/2019    Acute on chronic congestive heart failure ([address]     Right heart failure, unspecified ([address] 02/22/2019    Acute on chronic diastolic heart failure ([address] 02/22/2019    Muscle spasm 11/19/2018    Chronic narcotic dependence ([address] 11/16/2018    Chronically on benzodiazepine therapy 11/16/2018    Neuropathy 11/16/2018    Epigastric pain 11/16/2018    Chronic respiratory failure ([address] 09/16/2018    Pulmonary hypertension, moderate to severe ([address] 06/09/2018    Morbid obesity with BMI of 50.0-59.9, adult ([address]     Obesity hypoventilation syndrome ([address]     H/O tracheostomy     STEPH (obstructive sleep apnea)     Chest pain 06/08/2018    Acute non-recurrent pansinusitis     Status post tracheostomy ([address] 02/17/2018    Status post insertion of percutaneous endoscopic gastrostomy (PEG) tube ([address] 02/17/2018    Rhinovirus infection     Encounter for PEG (percutaneous endoscopic gastrostomy) ([address]     Fever     Disease due to rhinovirus     ARDS (adult respiratory distress syndrome) ([address] 02/02/2018    COPD exacerbation ([address]     NSTEMI (non-ST elevated myocardial infarction) ([address] 01/27/2018    Acute pulmonary edema ([address] 01/27/2018    Hypertensive emergency 01/27/2018    Acute respiratory failure with hypoxia and hypercapnia ([address] 01/27/2018    Smoker 01/27/2018    Morbid obesity ([address] 01/27/2018    SOB (shortness of breath) 01/27/2018    Pneumonia of both lower lobes due to infectious organism 01/26/2018    Asthma 09/21/2014    Pneumonia 09/21/2014    HTN (hypertension) 09/21/2014    Torn meniscus 09/21/2014        PLAN:     70-year-old Shahrzad obese female with acute on chronic COVID induced respiratory failure requiring max BiPAP with superimposed CHF and COPD, severe pulmonary hypertension    PLAN/MEDICAL DECISION MAKING:  Neurologic:   · Patient is neurologically intact, no focal deficits. Not on any sedation. Mentating appropriately, alert and oriented to time and place. Cardiovascular:  · COVID induced respiratory failure  · Continue BiPAP  · CHF  · Nitroglycerin as needed for chest pain  · Lasix 60 IV twice daily      Pulmonary:  · Patient on BiPAP, 100% FiO2, PCO2 increasing to 70 today up from 60 previously.   Patient's baseline is 60-80 at home. ·  Patient saturating 94 to 95% on 100% FiO2. · Severe pulmonary hypertension  · Possible COPD exacerbation  · Patient on Rocephin daily    GI/Nutrition  · Pepcid for GI prophylaxis  · Regular diet as tolerated. Renal/Fluid/Electrolyte  · Patient on Lasix  · Continue to monitor urine output  · urine output since admission, negative half liter since this a.m. At 0.2-0.4cc/kg/h  · Replete electrolytes as needed    ID  WBC:   Lab Results   Component Value Date    WBC 10.1 2022   ·   · Tmax: Temp (24hrs), Av.3 °F (36.8 °C), Min:97.9 °F (36.6 °C), Max:98.7 °F (37.1 °C)  ·   · Patient has been afebrile, hemodynamically stable, normal white count. Continue Rocephin daily for 5 days until 2022. · Blood cultures negative so far, urine culture pending. Hematology:  Recent Labs     22  0627   HGB 10.5*   ·  stable  Endocrine:   glucose controlled - most recent BGL is   Recent Labs     22  0627   GLUCOSE 180*   ·   DVT Prophylaxis  · Patient is on heparin for DVT prophylaxis.   Discharge Needs:  PT, OT, ST, SW and Case Management      CODE STATUS: Full Code      DISPOSITION:  [x] To remain ICU  [] OK for out of ICU from [address] [facility]  Emergency Medicine Resident  22 7:23 AM

## 2022-06-09 NOTE — PLAN OF CARE
Problem: Discharge Planning  Goal: Discharge to home or other facility with appropriate resources  6/8/2022 2338 by Jany Gonzales RN  Outcome: Progressing  6/8/2022 1651 by Awais Herring RN  Outcome: Progressing     Problem: Pain  Goal: Verbalizes/displays adequate comfort level or baseline comfort level  6/8/2022 2338 by Jany Gonzales RN  Outcome: Progressing  6/8/2022 1651 by Awais Herring RN  Outcome: Progressing     Problem: Chronic Conditions and Co-morbidities  Goal: Patient's chronic conditions and co-morbidity symptoms are monitored and maintained or improved  6/8/2022 2338 by Jany Gonzales RN  Outcome: Progressing  6/8/2022 1651 by Awais Herring RN  Outcome: Progressing     Problem: Skin/Tissue Integrity  Goal: Absence of new skin breakdown  Description: 1. Monitor for areas of redness and/or skin breakdown  2. Assess vascular access sites hourly  3. Every 4-6 hours minimum:  Change oxygen saturation probe site  4. Every 4-6 hours:  If on nasal continuous positive airway pressure, respiratory therapy assess nares and determine need for appliance change or resting period.   6/8/2022 2338 by Jany Gonzales RN  Outcome: Progressing  6/8/2022 1651 by Awais Herring RN  Outcome: Progressing     Problem: Safety - Adult  Goal: Free from fall injury  6/8/2022 2338 by Jany Gonzales RN  Outcome: Progressing  6/8/2022 1651 by Awais Herring RN  Outcome: Progressing

## 2022-06-09 NOTE — CARE COORDINATION
Case Management Initial Discharge Plan  Rosio Velásquez,       Attempted to call pt's room, no answer  Attempted to call pt's son David Durham- both numbers listed unable to get through  Spoke to pt's father Bandar Lance to discuss discharge plans. Information verified: address, contacts, phone number, , insurance Yes  Insurance Provider: Carie Sloan- primary, [employer] - secondary  [place]: No    Emergency Contact/Next of Kin name & number: pt's father Bandar Lance [phone], pt's sister, Ida Arguello [phone]  Who are involved in patient's support system? HC, pt's family    PCP: Nic Raymond DO  Date of last visit: unsure      Discharge Planning    Living Arrangements:        Home has 2 stories  4 stairs to climb to get into front door, one flight of stairs to climb to reach second floor  Location of bedroom/bathroom in home is on the main floor    Patient able to perform ADL's:Independent    [facility] & [facility] [address] verified with Kian Dasilva after hours. pt is current with their services and they can resume care when pt is discharged. DME equipment: trilogy, Home O2 , WC, Walker, Hospital bed  DME provider: [facility]    Is patient receiving oral anticoagulation therapy? If indicated:   Physician managing anticoagulation treatment:   Where does patient obtain lab work for ATC treatment? Does patient have any issues/concerns obtaining medications? If yes, what are patient's concerns? Is there a preferred Pharmacy after hours or on weekends? If yes, which pharmacy? Potential Assistance Needed:       Patient agreeable to home care: Yes  Freedom of choice provided:  Current w/ Med [address] [place], [place] [address].    Prior SNF/Rehab Placement and Facility: none  Agreeable to SNF/Rehab:   [place] of choice provided:      Evaluation: n/a    Expected Discharge date:       Patient expects to be discharged to:        If home: is the family and/or caregiver wiling & able

## 2022-06-09 NOTE — PLAN OF CARE
Problem: Discharge Planning  Goal: Discharge to home or other facility with appropriate resources  6/9/2022 1156 by Suzette Pickens  Outcome: Progressing  6/9/2022 1156 by Suzette Pickens  Outcome: Progressing  6/8/2022 2338 by Kinza Ngo RN  Outcome: Progressing     Problem: Pain  Goal: Verbalizes/displays adequate comfort level or baseline comfort level  6/9/2022 1156 by Suzette Pickens  Outcome: Progressing  6/9/2022 1156 by Suzette Pickens  Outcome: Progressing  6/8/2022 2338 by Kinza Ngo RN  Outcome: Progressing     Problem: Chronic Conditions and Co-morbidities  Goal: Patient's chronic conditions and co-morbidity symptoms are monitored and maintained or improved  6/9/2022 1156 by Suzette Pickens  Outcome: Progressing  6/9/2022 1156 by Suzette Pickens  Outcome: Progressing  6/8/2022 2338 by Kinza Ngo RN  Outcome: Progressing     Problem: Skin/Tissue Integrity  Goal: Absence of new skin breakdown  Description: 1. Monitor for areas of redness and/or skin breakdown  2. Assess vascular access sites hourly  3. Every 4-6 hours minimum:  Change oxygen saturation probe site  4. Every 4-6 hours:  If on nasal continuous positive airway pressure, respiratory therapy assess nares and determine need for appliance change or resting period.   6/9/2022 1156 by Suzette Pickens  Outcome: Progressing  6/9/2022 1156 by Suzette Pickens  Outcome: Progressing  6/8/2022 2338 by Kinza Ngo RN  Outcome: Progressing     Problem: Safety - Adult  Goal: Free from fall injury  6/9/2022 1156 by Suzette Pickens  Outcome: Progressing  6/9/2022 1156 by Suzette Pickens  Outcome: Progressing  6/8/2022 2338 by Kinza Ngo RN  Outcome: Progressing

## 2022-06-09 NOTE — PROGRESS NOTES
Physical Therapy         Physical Therapy Cancel Note      DATE: 2022    NAME: Dina Peterson  MRN: [id number]   : 1975      Patient not seen this date for Physical Therapy due to:    Since patient is at maximum bipap settings and saturating at 94-95% at rest, will plan to not mobilize or do evaluation today to prevent worsening respiratory status. Will continue to pursue.       Electronically signed by Carlos Eduardo Dodson PT on 2022 at 12:09 PM

## 2022-06-10 NOTE — PROGRESS NOTES
Writer notified Dr. Black Fitzgerald with Internal Medicine notified patient complains of anxiety and cough. New orders received for Tessalon carine every 8 hours PRN.

## 2022-06-10 NOTE — PLAN OF CARE
Problem: Discharge Planning  Goal: Discharge to home or other facility with appropriate resources  6/10/2022 1113 by Mike Braun  Outcome: Progressing  6/10/2022 0448 by Urvashi Yanes RN  Outcome: Progressing  Flowsheets  Taken 6/9/2022 2302 by Urvashi Yanes RN  Discharge to home or other facility with appropriate resources: Identify barriers to discharge with patient and caregiver  Taken 6/9/2022 2000 by Fermin Luevano RN  Discharge to home or other facility with appropriate resources: Identify barriers to discharge with patient and caregiver     Problem: Pain  Goal: Verbalizes/displays adequate comfort level or baseline comfort level  6/10/2022 1113 by Mike Braun  Outcome: Progressing  6/10/2022 0448 by Urvashi Yanes RN  Outcome: Progressing  Flowsheets (Taken 6/9/2022 2000 by Fermin Luevano RN)  Verbalizes/displays adequate comfort level or baseline comfort level: Encourage patient to monitor pain and request assistance     Problem: Chronic Conditions and Co-morbidities  Goal: Patient's chronic conditions and co-morbidity symptoms are monitored and maintained or improved  6/10/2022 1113 by Mike Braun  Outcome: Progressing  6/10/2022 0448 by Urvashi Yanes RN  Outcome: Progressing  Flowsheets  Taken 6/9/2022 2302 by Bob Mueller 34 - Patient's Chronic Conditions and Co-Morbidity Symptoms are Monitored and Maintained or Improved: Monitor and assess patient's chronic conditions and comorbid symptoms for stability, deterioration, or improvement  Taken 6/9/2022 2000 by Bob Cardoso 34 - Patient's Chronic Conditions and Co-Morbidity Symptoms are Monitored and Maintained or Improved: Monitor and assess patient's chronic conditions and comorbid symptoms for stability, deterioration, or improvement     Problem: Skin/Tissue Integrity  Goal: Absence of new skin breakdown  Description: 1. Monitor for areas of redness and/or skin breakdown  2. Assess vascular access sites hourly  3. Every 4-6 hours minimum:  Change oxygen saturation probe site  4. Every 4-6 hours:  If on nasal continuous positive airway pressure, respiratory therapy assess nares and determine need for appliance change or resting period.   6/10/2022 1113 by Mike Braun  Outcome: Progressing  6/10/2022 0448 by Urvashi Yanes RN  Outcome: Progressing     Problem: Safety - Adult  Goal: Free from fall injury  6/10/2022 1113 by Mike Braun  Outcome: Progressing  6/10/2022 0448 by Urvashi Yanes RN  Outcome: Progressing     Problem: ABCDS Injury Assessment  Goal: Absence of physical injury  6/10/2022 1113 by Mike Braun  Outcome: Progressing  6/10/2022 0448 by Urvashi Yanes RN  Outcome: Progressing

## 2022-06-10 NOTE — CARE COORDINATION
Transitional planning. BiPAP 80% and Heated HiFlow 100% 40L, Referral sent to [facility], pt is current with [address] [facility] has VPS. Monitor for SNF need - PT/OT ordered.

## 2022-06-10 NOTE — PROGRESS NOTES
INTENSIVE CARE UNIT  Resident Physician Progress Note    Patient - Jose Palafox  Date of Admission -  2022  6:12 AM  Date of Evaluation -  6/10/2022  Room and Bed Number -  3001/3001-01   Hospital Day - 2    SUBJECTIVE:     HISTORY OF PRESENT ILLNESS:    59-year-old female admitted for hypoxic respiratory failure requiring maximum BiPAP settings, 80% FiO2 secondary to COVID induced respiratory failure with acute on chronic CHF and/or COPD exacerbation with pulmonary hypertension. Patient is treating treated with ceftriaxone, diuretics, BiPAP, breathing treatments. OVERNIGHT EVENTS:      Patient required continuous BiPAP overnight, however is feeling better today. Patient still in moderate discomfort    OBJECTIVE:     VITAL SIGNS:  Patient Vitals for the past 8 hrs:   BP Temp Temp src Pulse Resp SpO2 Weight   06/10/22 0800  97.9 °F (36.6 °C) Oral       06/10/22 0600 114/72 97.7 °F (36.5 °C) Oral 70 17 (!) 89 %    06/10/22 0515 119/79   74 19 (!) 89 %    06/10/22 0457       (!) 334 lb 6.4 oz (151.7 kg)   06/10/22 0410 (!) 132/91   78 17     06/10/22 0400 (!) 143/126 97.2 °F (36.2 °C) Oral 73 16 90 %    06/10/22 0310     18     06/10/22 0300 124/88   75 17 90 %    06/10/22 0200 119/81 98.1 °F (36.7 °C) Oral 74 19 92 %        Last Body weight:   Wt Readings from Last 3 Encounters:   06/10/22 (!) 334 lb 6.4 oz (151.7 kg)   22 (!) 334 lb 3.5 oz (151.6 kg)   22 (!) 355 lb (161 kg)       Body Mass Index : Body mass index is 65.31 kg/m². Tmax over 24 hours: Temp (24hrs), Av.8 °F (36.6 °C), Min:97.2 °F (36.2 °C), Max:98.1 °F (36.7 °C)      Ins/Outs:    In: 1830 [P.O.:1374]  Out: 1660 [Urine:1660]    PHYSICAL EXAM:  Constitutional: Moderate distress, BMI 65  EENT: On BiPAP, trach scar present  Neck: Trach scar present, short neck  Respiratory: labored breathing; clear to auscultation, no wheezes or rales   Cardiovascular:  Regular rate  Abdomen: Soft, nontender  Extremities: Bilateral lower extremity edema, no calf tenderness    MEDICATIONS:  Scheduled Meds:   gabapentin  200 mg Oral TID    bumetanide  1 mg IntraVENous BID    cefTRIAXone (ROCEPHIN) IV  2,000 mg IntraVENous Q24H    dexamethasone  6 mg IntraVENous Q24H    famotidine  20 mg Oral BID    cetirizine  10 mg Oral Daily    fluticasone  1 spray Nasal Daily    sodium chloride flush  5-40 mL IntraVENous 2 times per day    insulin lispro  0-12 Units SubCUTAneous TID WC    insulin lispro  0-6 Units SubCUTAneous Nightly    ipratropium-albuterol  1 ampule Inhalation 4x daily    heparin (porcine)  5,000 Units SubCUTAneous 3 times per day    guaiFENesin  600 mg Oral BID       Continuous Infusions:   sodium chloride      dextrose         PRN Meds:   oxyCODONE-acetaminophen, 1 tablet, Q8H PRN  benzonatate, 100 mg, TID PRN  albuterol, 2.5 mg, Q6H PRN  sodium chloride flush, 5-40 mL, PRN  sodium chloride, , PRN  ondansetron, 4 mg, Q8H PRN   Or  ondansetron, 4 mg, Q6H PRN  polyethylene glycol, 17 g, Daily PRN  acetaminophen, 650 mg, Q6H PRN   Or  acetaminophen, 650 mg, Q6H PRN  potassium chloride, 40 mEq, PRN   Or  potassium alternative oral replacement, 40 mEq, PRN   Or  potassium chloride, 10 mEq, PRN  potassium chloride, 10 mEq, PRN  magnesium sulfate, 2,000 mg, PRN  glucose, 4 tablet, PRN  dextrose bolus, 125 mL, PRN   Or  dextrose bolus, 250 mL, PRN  glucagon (rDNA), 1 mg, PRN  dextrose, 100 mL/hr, PRN  morphine, 1 mg, Q4H PRN        SUPPORT DEVICES: [] Ventilator [x] BIPAP  [] Nasal Cannula [] Room Air    Vent Information  Ventilator ID: [id number]  Additional Respiratory Assessments  Heart Rate: 70  Resp: 17  SpO2: (!) 89 %  Humidification Source: Heated wire  Humidification Temp: 33  Circuit Condensation: Drained  Lab Results   Component Value Date    MODE NOT REPORTED 02/22/2019       ABGs:   Arterial Blood Gas result:  pH 7.316; pCO2 70.2 pO2 34.8 ; HCO3 34.8;; %O2 Sat 95%.      DATA:  Complete Blood Count:   Recent Labs     06/08/22  0627 06/09/22  1637 06/10/22  0706   WBC 10.1 11.8* 10.7   RBC 3.90* 3.97 3.61*   HGB 10.5* 10.7* 9.8*   HCT 35.7* 36.5 32.8*   MCV 91.5 91.9 90.9   MCH 26.9 27.0 27.1   MCHC 29.4 29.3 29.9   RDW 14.7* 14.6* 14.4    270 229   MPV 10.8 11.3 11.0        Last 3 Blood Glucose:   Recent Labs     06/08/22  0627 06/09/22  1741 06/10/22  0706   GLUCOSE 180* 241* 192*        PT/INR:    Lab Results   Component Value Date    PROTIME 10.6 06/29/2021    INR 1.0 06/29/2021     PTT:    Lab Results   Component Value Date    APTT 20.1 06/29/2021       Basic Metabolic Profile:   Recent Labs     06/08/22 0627 06/09/22  1741 06/10/22  0706    139 138   K 3.8 4.0 3.9   CL 94* 95* 96*   CO2 30 32* 30   BUN 16 28* 36*   CREATININE 0.87 1.09* 1.05*   GLUCOSE 180* 241* 192*       Liver Function:  No results for input(s): PROT, LABALBU, ALT, AST, GGT, ALKPHOS, BILITOT in the last 72 hours.     Magnesium:   Lab Results   Component Value Date    MG 1.8 03/27/2022    MG 1.6 03/25/2022    MG 1.5 03/23/2022     Phosphorus:   Lab Results   Component Value Date    PHOS 2.4 02/22/2019    PHOS 3.8 02/24/2018    PHOS 2.6 02/22/2018     Ionized Calcium:   Lab Results   Component Value Date    CAION 1.05 02/22/2019    CAION 1.11 02/22/2018    CAION 1.15 02/06/2018        Urinalysis:   Lab Results   Component Value Date    NITRU NEGATIVE 03/27/2022    COLORU Yellow 03/27/2022    PHUR 7.5 03/27/2022    WBCUA 2 TO 5 03/27/2022    RBCUA 50  03/27/2022    MUCUS NOT REPORTED 05/08/2019    TRICHOMONAS NOT REPORTED 05/08/2019    YEAST FEW 03/27/2022    BACTERIA FEW 05/08/2019    SPECGRAV 1.009 03/27/2022    LEUKOCYTESUR SMALL 03/27/2022    UROBILINOGEN Normal 03/27/2022    BILIRUBINUR NEGATIVE 03/27/2022    GLUCOSEU NEGATIVE 03/27/2022    KETUA NEGATIVE 03/27/2022    AMORPHOUS NOT REPORTED 05/08/2019       HgBA1c:    Lab Results   Component Value Date    LABA1C 6.8 06/08/2022 TSH:    Lab Results   Component Value Date    TSH 2.36 02/23/2019     Lactic Acid: No results found for: LACTA   Troponin: No results for input(s): TROPONINI in the last 72 hours. Other Labs:  Results for orders placed or performed during the hospital encounter of 06/08/22   Respiratory Panel, Molecular, with COVID-19 (Restricted: peds pts or suitable admitted adults)    Specimen: Nasopharyngeal Swab   Result Value Ref Range    Specimen Description . NASOPHARYNGEAL SWAB     Adenovirus PCR Not Detected Not Detected    Coronavirus 229E PCR Not Detected Not Detected    Coronavirus HKU1 PCR Not Detected Not Detected    Coronavirus NL63 PCR Not Detected Not Detected    Coronavirus OC43 PCR Not Detected Not Detected    SARS-CoV-2, PCR DETECTED (A) Not Detected    Human Metapneumovirus PCR Not Detected Not Detected    Rhino/Enterovirus PCR Not Detected Not Detected    Influenza A by PCR Not Detected Not Detected    Influenza B by PCR Not Detected Not Detected    Parainfluenza 1 PCR Not Detected Not Detected    Parainfluenza 2 PCR Not Detected Not Detected    Parainfluenza 3 PCR Not Detected Not Detected    Parainfluenza 4 PCR Not Detected Not Detected    Resp Syncytial Virus PCR Not Detected Not Detected    Bordetella Parapertussis Not Detected Not Detected    B Pertussis by PCR Not Detected Not Detected    Chlamydia pneumoniae By PCR Not Detected Not Detected    Mycoplasma pneumo by PCR Not Detected Not Detected   CBC with Auto Differential   Result Value Ref Range    WBC 10.1 3.5 - 11.3 k/uL    RBC 3.90 (L) 3.95 - 5.11 m/uL    Hemoglobin 10.5 (L) 11.9 - 15.1 g/dL    Hematocrit 35.7 (L) 36.3 - 47.1 %    MCV 91.5 82.6 - 102.9 fL    MCH 26.9 25.2 - 33.5 pg    MCHC 29.4 28.4 - 34.8 g/dL    RDW 14.7 (H) 11.8 - 14.4 %    Platelets 503 297 - 777 k/uL    MPV 10.8 8.1 - 13.5 fL    NRBC Automated 0.0 0.0 per 100 WBC    Seg Neutrophils 85 (H) 36 - 65 %    Lymphocytes 8 (L) 24 - 43 %    Monocytes 6 3 - 12 %    Eosinophils % 0 (L) 1 - 4 %    Basophils 0 0 - 2 %    Immature Granulocytes 1 (H) 0 %    Segs Absolute 8.56 (H) 1.50 - 8.10 k/uL    Absolute Lymph # 0.79 (L) 1.10 - 3.70 k/uL    Absolute Mono # 0.63 0.10 - 1.20 k/uL    Absolute Eos # <0.03 0.00 - 0.44 k/uL    Basophils Absolute <0.03 0.00 - 0.20 k/uL    Absolute Immature Granulocyte 0.09 0.00 - 0.30 k/uL    RBC Morphology ANISOCYTOSIS PRESENT    Basic Metabolic Panel w/ Reflex to MG   Result Value Ref Range    Glucose 180 (H) 70 - 99 mg/dL    BUN 16 6 - 20 mg/dL    CREATININE 0.87 0.50 - 0.90 mg/dL    Calcium 9.2 8.6 - 10.4 mg/dL    Sodium 139 135 - 144 mmol/L    Potassium 3.8 3.7 - 5.3 mmol/L    Chloride 94 (L) 98 - 107 mmol/L    CO2 30 20 - 31 mmol/L    Anion Gap 15 9 - 17 mmol/L    GFR Non-African American >60 >60 mL/min    GFR African American >60 >60 mL/min    GFR Comment         Troponin   Result Value Ref Range    Troponin, High Sensitivity 12 0 - 14 ng/L   Brain Natriuretic Peptide   Result Value Ref Range    Pro-BNP 4,082 (H) <300 pg/mL   Troponin   Result Value Ref Range    Troponin, High Sensitivity 13 0 - 14 ng/L   ELECTROLYTES PLUS   Result Value Ref Range    POC Sodium 141 138 - 146 mmol/L    POC Potassium 3.6 3.5 - 4.5 mmol/L    POC Chloride 100 98 - 107 mmol/L    POC TCO2 35 (H) 22 - 30 mmol/L    Anion Gap 7 7 - 16 mmol/L   Hemoglobin and hematocrit, blood   Result Value Ref Range    POC Hemoglobin 14.0 12.0 - 16.0 g/dL    POC Hematocrit 41 36 - 46 %   CALCIUM, IONIC (POC)   Result Value Ref Range    POC Ionized Calcium 1.12 (L) 1.15 - 1.33 mmol/L   Hemoglobin A1C   Result Value Ref Range    Hemoglobin A1C 6.8 (H) 4.0 - 6.0 %    Estimated Avg Glucose 148 mg/dL   Procalcitonin   Result Value Ref Range    Procalcitonin 0.19 (H) <0.09 ng/mL   BLOOD GAS, VENOUS   Result Value Ref Range    pH, Zaid 7.316 (L) 7.320 - 7.420    pCO2, Zaid 70.2 (H) 39 - 55    pO2, Zaid 34.8 30 - 50    HCO3, Venous 34.8 (H) 24 - 30 mmol/L    Positive Base Excess, Zaid 7.3 (H) 0.0 - 2.0 mmol/L O2 Sat, Zaid 59.1 (L) 60.0 - 85.0 %    Carboxyhemoglobin 1.4 0 - 5 %    Pt Temp 37.0     FIO2 UNKNOWN    CBC with Auto Differential   Result Value Ref Range    WBC 11.8 (H) 3.5 - 11.3 k/uL    RBC 3.97 3.95 - 5.11 m/uL    Hemoglobin 10.7 (L) 11.9 - 15.1 g/dL    Hematocrit 36.5 36.3 - 47.1 %    MCV 91.9 82.6 - 102.9 fL    MCH 27.0 25.2 - 33.5 pg    MCHC 29.3 28.4 - 34.8 g/dL    RDW 14.6 (H) 11.8 - 14.4 %    Platelets 413 166 - 830 k/uL    MPV 11.3 8.1 - 13.5 fL    NRBC Automated 0.0 0.0 per 100 WBC    Immature Granulocytes 4 (H) 0 %    Seg Neutrophils 87 (H) 36 - 66 %    Lymphocytes 6 (L) 24 - 44 %    Monocytes 3 1 - 7 %    Eosinophils % 0 (L) 1 - 4 %    Basophils 0 0 - 2 %    Absolute Immature Granulocyte 0.47 (H) 0.00 - 0.30 k/uL    Segs Absolute 10.27 (H) 1.8 - 7.7 k/uL    Absolute Lymph # 0.71 (L) 1.0 - 4.8 k/uL    Absolute Mono # 0.35 0.1 - 0.8 k/uL    Absolute Eos # 0.00 0.0 - 0.4 k/uL    Basophils Absolute 0.00 0.0 - 0.2 k/uL    Morphology ANISOCYTOSIS PRESENT    SPECIMEN REJECTION   Result Value Ref Range    Specimen Source . BLOOD     Ordered Test CDP, BMPX     Reason for Rejection Unable to perform testing: Specimen clotted. SPECIMEN REJECTION   Result Value Ref Range    Specimen Source . BLOOD     Ordered Test BMPX     Reason for Rejection Unable to perform testing: Specimen hemolyzed.     Basic Metabolic Panel w/ Reflex to MG   Result Value Ref Range    Glucose 241 (H) 70 - 99 mg/dL    BUN 28 (H) 6 - 20 mg/dL    CREATININE 1.09 (H) 0.50 - 0.90 mg/dL    Calcium 8.6 8.6 - 10.4 mg/dL    Sodium 139 135 - 144 mmol/L    Potassium 4.0 3.7 - 5.3 mmol/L    Chloride 95 (L) 98 - 107 mmol/L    CO2 32 (H) 20 - 31 mmol/L    Anion Gap 12 9 - 17 mmol/L    GFR Non-African American 54 (L) >60 mL/min    GFR African American >60 >60 mL/min    GFR Comment         Basic Metabolic Panel w/ Reflex to MG   Result Value Ref Range    Glucose 192 (H) 70 - 99 mg/dL    BUN 36 (H) 6 - 20 mg/dL    CREATININE 1.05 (H) 0.50 - 0.90 mg/dL Calcium 8.6 8.6 - 10.4 mg/dL    Sodium 138 135 - 144 mmol/L    Potassium 3.9 3.7 - 5.3 mmol/L    Chloride 96 (L) 98 - 107 mmol/L    CO2 30 20 - 31 mmol/L    Anion Gap 12 9 - 17 mmol/L    GFR Non-African American 56 (L) >60 mL/min    GFR African American >60 >60 mL/min    GFR Comment         CBC with Auto Differential   Result Value Ref Range    WBC 10.7 3.5 - 11.3 k/uL    RBC 3.61 (L) 3.95 - 5.11 m/uL    Hemoglobin 9.8 (L) 11.9 - 15.1 g/dL    Hematocrit 32.8 (L) 36.3 - 47.1 %    MCV 90.9 82.6 - 102.9 fL    MCH 27.1 25.2 - 33.5 pg    MCHC 29.9 28.4 - 34.8 g/dL    RDW 14.4 11.8 - 14.4 %    Platelets 857 334 - 223 k/uL    MPV 11.0 8.1 - 13.5 fL    NRBC Automated 0.0 0.0 per 100 WBC    Seg Neutrophils 83 (H) 36 - 65 %    Lymphocytes 9 (L) 24 - 43 %    Monocytes 7 3 - 12 %    Eosinophils % 0 (L) 1 - 4 %    Basophils 0 0 - 2 %    Immature Granulocytes 1 (H) 0 %    Segs Absolute 8.85 (H) 1.50 - 8.10 k/uL    Absolute Lymph # 0.98 (L) 1.10 - 3.70 k/uL    Absolute Mono # 0.74 0.10 - 1.20 k/uL    Absolute Eos # <0.03 0.00 - 0.44 k/uL    Basophils Absolute <0.03 0.00 - 0.20 k/uL    Absolute Immature Granulocyte 0.06 0.00 - 0.30 k/uL   Venous Blood Gas, POC   Result Value Ref Range    pH, Zaid 7.361 7.320 - 7.430    pCO2, Zaid 60.6 (H) 41.0 - 51.0 mm Hg    pO2, Zaid 34.8 30.0 - 50.0 mm Hg    HCO3, Venous 34.3 (H) 22.0 - 29.0 mmol/L    Positive Base Excess, Zaid 7 (H) 0.0 - 3.0    O2 Sat, Zaid 62 60.0 - 85.0 %   Creatinine W/GFR Point of Care   Result Value Ref Range    POC Creatinine 0.89 0.51 - 1.19 mg/dL    GFR Comment >60 >60 mL/min    GFR Non-African American >60 >60 mL/min    GFR Comment         POCT urea (BUN)   Result Value Ref Range    POC BUN 16 8 - 26 mg/dL   Lactic Acid, POC   Result Value Ref Range    POC Lactic Acid 1.08 0.56 - 1.39 mmol/L   POCT Glucose   Result Value Ref Range    POC Glucose 191 (H) 74 - 100 mg/dL   POC Glucose Fingerstick   Result Value Ref Range    POC Glucose 199 (H) 65 - 105 mg/dL   POC Glucose Fingerstick   Result Value Ref Range    POC Glucose 211 (H) 65 - 105 mg/dL   POC Glucose Fingerstick   Result Value Ref Range    POC Glucose 198 (H) 65 - 105 mg/dL   POC Glucose Fingerstick   Result Value Ref Range    POC Glucose 220 (H) 65 - 105 mg/dL   POC Glucose Fingerstick   Result Value Ref Range    POC Glucose 204 (H) 65 - 105 mg/dL   POC Glucose Fingerstick   Result Value Ref Range    POC Glucose 179 (H) 65 - 105 mg/dL   POC Glucose Fingerstick   Result Value Ref Range    POC Glucose 240 (H) 65 - 105 mg/dL   POC Glucose Fingerstick   Result Value Ref Range    POC Glucose 205 (H) 65 - 105 mg/dL   POC Glucose Fingerstick   Result Value Ref Range    POC Glucose 175 (H) 65 - 105 mg/dL   EKG 12 Lead   Result Value Ref Range    Ventricular Rate 99 BPM    Atrial Rate 99 BPM    P-R Interval 148 ms    QRS Duration 84 ms    Q-T Interval 360 ms    QTc Calculation (Bazett) 462 ms    P Axis 46 degrees    R Axis 53 degrees    T Axis -17 degrees     Radiology/Imaging:  VL DUP LOWER EXTREMITY VENOUS BILATERAL   Final Result      XR CHEST PORTABLE   Final Result   Cardiomegaly mild vascular congestion similar to prior.            ASSESSMENT:     Patient Active Problem List    Diagnosis Date Noted    COVID-19 virus infection 06/08/2022    Acute on chronic diastolic congestive heart failure ([address] 06/08/2022    Respiratory distress     Diarrhea 04/19/2022    Cor pulmonale, chronic ([address] 04/17/2022    CHF (congestive heart failure), NYHA class I, acute on chronic, combined ([facility] [address] 04/08/2022    Prediabetes 03/19/2022    Hyperlipidemia 03/19/2022    Hypokalemia 03/19/2022    Hypomagnesemia 03/19/2022    Dyspnea 03/18/2022    Type 2 diabetes mellitus ([address] 05/06/2019    Dizziness 05/06/2019    Normocytic anemia 05/06/2019    Acute on chronic congestive heart failure ([address]     Right heart failure, unspecified ([facility] [address] 02/22/2019    Acute on chronic diastolic heart failure ([address] 02/22/2019    Muscle spasm 11/19/2018    Chronic narcotic dependence ([facility] [address] 11/16/2018    Chronically on benzodiazepine therapy 11/16/2018    Neuropathy 11/16/2018    Epigastric pain 11/16/2018    Chronic respiratory failure ([address] 09/16/2018    Pulmonary hypertension, moderate to severe ([address] 06/09/2018    Morbid obesity with BMI of 50.0-59.9, adult ([address]     Obesity hypoventilation syndrome ([address]     H/O tracheostomy     STEPH (obstructive sleep apnea)     Chest pain 06/08/2018    Acute non-recurrent pansinusitis     Status post tracheostomy ([address] 02/17/2018    Status post insertion of percutaneous endoscopic gastrostomy (PEG) tube ([address] 02/17/2018    Rhinovirus infection     Encounter for PEG (percutaneous endoscopic gastrostomy) ([address]     Fever     Disease due to rhinovirus     ARDS (adult respiratory distress syndrome) ([address] 02/02/2018    COPD exacerbation ([address]     NSTEMI (non-ST elevated myocardial infarction) ([address] 01/27/2018    Acute pulmonary edema ([address] 01/27/2018    Hypertensive emergency 01/27/2018    Acute respiratory failure with hypoxia and hypercapnia ([address] 01/27/2018    Smoker 01/27/2018    Morbid obesity ([facility] [address] 01/27/2018    SOB (shortness of breath) 01/27/2018    Pneumonia of both lower lobes due to infectious organism 01/26/2018    Asthma 09/21/2014    Pneumonia 09/21/2014    HTN (hypertension) 09/21/2014    Torn meniscus 09/21/2014      PLAN:     61-year-old female with acute on chronic hypercapnic hypoxic respiratory failure requiring max BiPAP with superimposed COVID, CHF and COPD, severe pulmonary hypertension    PLAN/MEDICAL DECISION MAKING:  Neurologic:   Patient is neurologically intact, no focal deficits. Not on any sedation. Cardiovascular:  · COVID-induced respiratory failure  · Continue BiPAP  · CHF  · Nitroglycerin as needed for chest pain  · Bumetanide 1mg bid     Pulmonary:  · Patient on BiPAP, 80% FiO2, last PCO2 70. Patient's baseline is 60-80 at home.   Vent Information  · Ventilator ID: [id number]   · Patient saturating 88% on 80% FiO2. · Severe pulmonary hypertension  · Possible COPD exacerbation: duoneb, tessalon, mucinex  · Patient on Rocephin, day 3    GI/Nutrition  · Pepcid for GI prophylaxis  · Diabetic diet as tolerated. Renal/Fluid/Electrolyte  · Patient on bumetanide  · Continue to monitor urine output  · 0.5 cc/kg/h with 2 unmeasured instances  · Replete electrolytes as needed    ID  WBC:   Lab Results   Component Value Date    WBC 10.7 06/10/2022     Tmax: Temp (24hrs), Av.8 °F (36.6 °C), Min:97.2 °F (36.2 °C), Max:98.1 °F (36.7 °C)  ·   · Patient has been afebrile, hemodynamically stable, normal white count. · Continue Rocephin daily for 5 days until 2022. Hematology:  Recent Labs     22  0627 22  1637 06/10/22  0706   HGB 10.5* 10.7* 9.8*   ·  stable  Endocrine:   glucose controlled - most recent BGL is   Recent Labs     22  0627 22  1741 06/10/22  0706   GLUCOSE 180* 241* 192*     DVT Prophylaxis  · Patient is on heparin for DVT prophylaxis.   Discharge Needs:  PT, OT, ST, SW and Case Management      CODE STATUS: Full Code    DISPOSITION:  [x] To remain ICU  [] OK for out of ICU from [facility] MD DEVANTE Reece Resident  06/10/22 9:04 AM

## 2022-06-10 NOTE — PROGRESS NOTES
Occupational Therapy  Facility/Department: [facility]  Occupational Therapy Initial Assessment    Name: Jerod Hernandez  : 1975  MRN: [id number]  Date of Service: 6/10/2022    Discharge Recommendations:  Patient would benefit from continued therapy after discharge  OT Equipment Recommendations  Equipment Needed:  (CTA pending progress)     Chief Complaint   Patient presents with    Respiratory Distress     Patient Diagnosis(es): The encounter diagnosis was Respiratory distress. 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 Past Surgical History:  has a past surgical history that includes  section (); Abdomen surgery; joint replacement; Cystoscopy (2019); Cystoscopy (N/A, 2019); hc cath power picc triple (2018); pr office/outpt visit,procedure only (N/A, 2018); Tracheotomy (2018); Gastrostomy tube placement (2018); and tracheostomy closure (2018). Assessment   Performance deficits / Impairments: Decreased functional mobility ; Decreased safe awareness;Decreased balance;Decreased ADL status; Decreased endurance;Decreased strength  Assessment: Pt completed initial OT evaluation this AM, limited by above noted deficits. Pt recommended to continue acute OT to maximize safety and independence throughout functional tasks.  Pt unsafe to return to prior living arrangement due to deficits and expected to require 24/7 assist/supervision upon discharge unless progresses toward goals. Prognosis: Fair  Decision Making: Medium Complexity  REQUIRES OT FOLLOW-UP: Yes  Activity Tolerance  Activity Tolerance: Patient limited by fatigue;Treatment limited secondary to medical complications (free text)  Activity Tolerance Comments: Pt became SOB upon sitting EOB (O2 levels in upper 80s - 90s throughout) and requesting to return to supine due to fatigue/difficulty breathing        Plan   Plan  Times per Week: 3-5x/wk  Times per Day: Daily  Current Treatment Recommendations: Strengthening,Balance training,Functional mobility training,Endurance training,Safety education & training,Patient/Caregiver education & training,Equipment evaluation, education, & procurement,Self-Care / ADL     Restrictions  Restrictions/Precautions  Restrictions/Precautions: Isolation,Up as Tolerated (Droplet Plus Precautions (COVID +))  Required Braces or Orthoses?: No  Position Activity Restriction  Other position/activity restrictions: High-flow O2 (40 LPM)    Subjective   General  Patient assessed for rehabilitation services?: Yes  Family / Caregiver Present: No  General Comment  Comments: RN ok'd pt for therapy this AM. Pt agreeable to session and pleasant/cooperative throughout. Social/Functional History  Social/Functional History  Lives With: Alone,Home care staff  Type of Home: House  Home Layout: Performs ADL's on one level,Two level  Bathroom Shower/Tub: Tub/Shower unit (Home health completes sponge baths)  Bathroom Toilet: Standard  Bathroom Equipment: Commode (Primarily uses commode, pt reports able to get to independently if within 3 ft)  Home Equipment: Walker, rolling  Receives Help From: Home health,Other (comment) (Home health M-F 9-5, Sat 10-2, Sun 6-8; PT/OT at least 1x per week)  ADL Assistance: Needs assistance  Bath: Maximum assistance  Dressing:  Moderate assistance (Pt reports home health helps with LB dressing)  Toileting: Needs assistance  Homemaking Assistance: Needs assistance  Homemaking Responsibilities: No  Ambulation Assistance: Independent (with RW)  Transfer Assistance: Independent (Pt reports can get to commode if nearby but home health helps if there)  Active : No  Patient's  Info: Pt reports has left house to get into car since 2018     Objective   Heart Rate: 73  Heart Rate Source: Monitor;Telemetry  BP: 127/82  BP Location: Right lower arm  BP Method: Automatic  MAP (Calculated): 97  Resp: 17  SpO2: 92 %  O2 Device: (S) PAP (positive airway pressure)  Vision Exceptions: Wears glasses at all times  Hearing: Within functional limits       Observation/Palpation  Observation: She is short in stature and obese at 334 lbs. Abdominal girth limits hip and knee flexion in the bed. One LE is externally rotated most of the time. Safety Devices  Type of Devices: All fall risk precautions in place;Call light within reach;Nurse notified; Left in bed  Restraints  Restraints Initially in Place: No  Balance  Sitting: With support (Mod A x2 for sitting EOB ~2 min total)  Standing:  (Not assessed)     AROM: Generally decreased, functional  Strength: Grossly decreased, non-functional ((grossly 4-/5))  Coordination: Generally decreased, functional  Tone: Normal  Sensation: Intact (Pt denies numbness/tingling)  ADL  Feeding: Modified independent ;Setup  Grooming: Moderate assistance;Setup; Increased time to complete;Verbal cueing;Minimal assistance  UE Bathing: Increased time to complete;Verbal cueing;Setup; Moderate assistance  LE Bathing: Maximum assistance; Increased time to complete;Verbal cueing;Setup  UE Dressing: Setup;Verbal cueing; Increased time to complete; Moderate assistance  LE Dressing: Maximum assistance; Increased time to complete;Verbal cueing;Setup  Toileting: Maximum assistance; Increased time to complete;Setup     Activity Tolerance  Activity Tolerance: Patient limited by endurance  Bed mobility  Scooting: Moderate assistance  Bed Mobility Comments: HOB elevated ~15 degrees and use of handrails. Pt required mod verbal and tactile cues for safety awareness, proper hand/foot placement throughout. Transfers  Transfer Comments: Not assessed. Pt able to sit EOB ~2 min before complaining of difficulty breathing and requesting to lay back down. Cognition  Overall Cognitive Status: WFL     Education Given To: Patient  Education Provided: Role of Therapy;Plan of Care;ADL Adaptive Strategies;Transfer Training; Fall Prevention Strategies; Energy Conservation  Education Provided Comments: Safety Awareness, Activity Promotion - Good return  Education Method: Demonstration;Verbal  Barriers to Learning: None  Education Outcome: Verbalized understanding;Continued education needed  LUE AROM (degrees)  LUE AROM : WFL  Left Hand AROM (degrees)  Left Hand AROM: WFL  RUE AROM (degrees)  RUE AROM : WFL  Right Hand AROM (degrees)  Right Hand AROM: WFL     AM-PAC Score  AM-PAC Inpatient Daily Activity Raw Score: 15 (06/10/22 1445)  AM-PAC Inpatient ADL T-Scale Score : 34.69 (06/10/22 1445)  ADL Inpatient CMS 0-100% Score: 56.46 (06/10/22 1445)  ADL Inpatient CMS G-Code Modifier : CK (06/10/22 1445)    Goals  Short Term Goals  Time Frame for Short term goals: Upon discharge, pt will  Short Term Goal 1: independently demonstrate good safety awareness throughout functional tasks  Short Term Goal 2: perform bed mobility and functional transfers with Min A and use of LRD  Short Term Goal 3: perform UB ADLs with SBA and use of AE/DME PRN  Short Term Goal 4: perform LB ADLs with Min A and use of AE/DME PRN  Short Term Goal 5: demonstrate 15+ minutes activity tolerance for increased ADL participation     Therapy Time   Individual Concurrent Group Co-treatment   Time In 1030         Time Out 1055         Minutes 25         Timed Code Treatment Minutes: 8 Minutes     HANDY Mcnair/L

## 2022-06-10 NOTE — PLAN OF CARE
Problem: ABCDS Injury Assessment  Goal: Absence of physical injury  Outcome: Progressing     Problem: Safety - Adult  Goal: Free from fall injury  Outcome: Progressing     Problem: Skin/Tissue Integrity  Goal: Absence of new skin breakdown  Description: 1. Monitor for areas of redness and/or skin breakdown  2. Assess vascular access sites hourly  3. Every 4-6 hours minimum:  Change oxygen saturation probe site  4. Every 4-6 hours:  If on nasal continuous positive airway pressure, respiratory therapy assess nares and determine need for appliance change or resting period.   Outcome: Progressing     Problem: Chronic Conditions and Co-morbidities  Goal: Patient's chronic conditions and co-morbidity symptoms are monitored and maintained or improved  Outcome: Progressing  Flowsheets  Taken 6/9/2022 2302 by Bob Trammell 34 - Patient's Chronic Conditions and Co-Morbidity Symptoms are Monitored and Maintained or Improved: Monitor and assess patient's chronic conditions and comorbid symptoms for stability, deterioration, or improvement    Problem: Discharge Planning  Goal: Discharge to home or other facility with appropriate resources  Outcome: Progressing  Flowsheets  Taken 6/9/2022 2302 by General Vanessa RN  Discharge to home or other facility with appropriate resources: Identify barriers to discharge with patient and caregiver

## 2022-06-10 NOTE — PROGRESS NOTES
Writer in room for repositioning and patient refusing. Writer explain rationale for repositioning and to avoid wounds. Patient states she is comfortable at this time and does not want to be reposition.

## 2022-06-10 NOTE — PROGRESS NOTES
Physical Therapy  Facility/Department: [facility]  Physical Therapy Initial Assessment    Name: Orly Ochoa  : 1975  MRN: [id number]  Date of Service: 6/10/2022  Orly Ochoa is a 55 y.o. with PMH of CHF, morbid obesity, presented to the ER with dyspnea, tested positive for COVID presenting in acute respiratory distress. Most recent echocardiogram completed in 2022 significant for severe pulmonary hypertension with right ventricular systolic pressure of 88 mmHg. Patient has a trilogy respirator at home, normally saturates between 88 and 92%. Presented to the ER saturating to the low 80s. The patient did not have altered mental status, she was wheezing in the ER, admitted to medicine, subsequently transferred to the ICU for persistent respiratory distress, requiring maximum BiPAP settings 100% FiO2. Patient has a persistent PCO2 around 60-70. Upon arrival to the ICU, patient was maintained on BiPAP settings and saturating around 95 to 97%. Patient was mentating appropriately, she did not have any acute pain.          Discharge Recommendations:  Patient would benefit from continued therapy after discharge   PT Equipment Recommendations  Equipment Needed: No      Patient Diagnosis(es): The encounter diagnosis was Respiratory distress. 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   Past Surgical History:  has a past surgical history that includes  section (); Abdomen surgery; joint replacement; Cystoscopy (2019); Cystoscopy (N/A, 2019); hc cath power picc triple (2018); pr office/outpt visit,procedure only (N/A, 2018); Tracheotomy (2018); Gastrostomy tube placement (2018); and tracheostomy closure (2018). Assessment   Body Structures, Functions, Activity Limitations Requiring Skilled Therapeutic Intervention: Decreased functional mobility ; Decreased coordination;Decreased ROM; Decreased strength;Decreased balance;Decreased endurance  Assessment: Patient was able to dangle at edge of bed, but with a desaturation and severe shortness of breath. She was not breathing well enough to attempt to stand, pivot, or ambulate. Will continue with PT to regain functional independence. Therapy Prognosis: Good  Decision Making: Medium Complexity  Clinical Presentation: evolving  Requires PT Follow-Up: Yes  Activity Tolerance  Activity Tolerance: Patient limited by endurance     Plan   Plan  Plan:  (5-6x/wk)  Current Treatment Recommendations: Strengthening,ROM,Balance training,Functional mobility training,Transfer training,Endurance training,Gait training,Home exercise program,Safety education & training,Positioning,Patient/Caregiver education & training,Equipment evaluation, education, & procurement,Therapeutic activities  Safety Devices  Type of Devices: All fall risk precautions in place,Call light within reach,Gait belt,Nurse notified,Left in bed     Restrictions  Restrictions/Precautions  Restrictions/Precautions: Isolation  Position Activity Restriction  Other position/activity restrictions: covid+.  on 40 LPM.     Subjective   General  Chart Reviewed: Yes  Patient assessed for rehabilitation services?: Yes  Family / Caregiver Present: No  Follows Commands: Within Functional Limits  Subjective  Subjective: Pain on coccyx 5/10         Social/Functional History  Social/Functional History  Lives With: Alone,Home care staff  Type of Home: House  Home Layout: Performs ADL's on one level,Two level  Bathroom Shower/Tub: Tub/Shower unit (Home health completes sponge baths)  Bathroom Toilet: Standard  Bathroom Equipment: Commode (Primarily uses commode, pt reports able to get to independently if within 3 ft)  Home Equipment: Walker, rolling  Receives Help From: Home health,Other (comment) (Home health M-F 9-5, Sat 10-2, Sun 6-8; PT/OT at least 1x per week)  ADL Assistance: Needs assistance  Bath: Maximum assistance  Dressing: Moderate assistance (Pt reports home health helps with LB dressing)  Toileting: Needs assistance  Homemaking Assistance: Needs assistance  Homemaking Responsibilities: No  Ambulation Assistance: Independent (with RW)  Transfer Assistance: Independent (Pt reports can get to commode if nearby but home health helps if there)  Active : No  Patient's  Info: pt reports has left house to get into car since 2018  Additional Comments: Pt reports received PT at home prior to [facility]       Cognition   Orientation  Orientation Level: Oriented X4  Cognition  Overall Cognitive Status: WFL     Objective      Observation/Palpation  Observation: She is short in stature and obese at 334 lbs. Abdominal girth limits hip and knee flexion in the bed. One LE is externally rotated most of the time.        Strength RLE  Strength RLE: Exception  R Hip Flexion: 2+/5  R Hip Extension: 2+/5  R Knee Flexion: 2+/5  R Knee Extension: 3-/5  R Ankle Dorsiflexion: 3+/5  R Ankle Plantar flexion: 3+/5  Strength LLE  Strength LLE: Exception  L Hip Flexion: 2+/5  L Hip Extension: 3-/5  L Knee Flexion: 3-/5  L Knee Extension: 3/5  L Ankle Dorsiflexion: 4-/5  L Ankle Plantar Flexion: 4-/5           Bed mobility  Supine to Sit: Moderate assistance;2 Person assistance  Sit to Supine: Maximum assistance;2 Person assistance           Balance  Sitting - Static: -;Fair (Due to difficulty breathing, upon

## 2022-06-11 NOTE — PROGRESS NOTES
INTENSIVE CARE UNIT  Resident Physician Progress Note    Patient - Hodan Kaufman  Date of Admission -  2022  6:12 AM  Date of Evaluation -  2022  Room and Bed Number -  3001/3001-01   Hospital Day - 3    SUBJECTIVE:     HISTORY OF PRESENT ILLNESS:    14-year-old female admitted for hypoxic respiratory failure requiring maximum BiPAP settings, 80% FiO2 secondary to COVID induced respiratory failure with acute on chronic CHF and/or COPD exacerbation with pulmonary hypertension. Patient is treating treated with ceftriaxone, diuretics, BiPAP, breathing treatments. OVERNIGHT EVENTS:      Patient required continuous BiPAP overnight, FiO2 100%. Patient feels constipated and has a cough. Overall, is feeling better today. Denies any pain during exam, including chest pain. Satting 88% to low 90s when switched to HFNC. *Update: patient reports a lot of discomfort/ pain secondary to constipation. OBJECTIVE:     VITAL SIGNS:  Patient Vitals for the past 8 hrs:   BP Temp Temp src Pulse Resp SpO2   22 1220    95 19 91 %   22 0900 127/83   67 23    22 0808    64 24 92 %   22 0800 124/84 97.5 °F (36.4 °C) Oral 70 21    22 0700 118/84   67 23 94 %   22 0645    68 23 93 %   22 0630    68 21 94 %   22 0615    67 17 95 %   22 0600 118/79 97.8 °F (36.6 °C) Axillary 63 17 95 %   22 0545    64 17 96 %   22 0530    66 16 97 %   22 0515    81 21 94 %       Last Body weight:   Wt Readings from Last 3 Encounters:   06/10/22 (!) 334 lb 6.4 oz (151.7 kg)   22 (!) 334 lb 3.5 oz (151.6 kg)   22 (!) 355 lb (161 kg)       Body Mass Index : Body mass index is 65.31 kg/m². Tmax over 24 hours: Temp (24hrs), Av.7 °F (36.5 °C), Min:97.2 °F (36.2 °C), Max:98 °F (36.7 °C)      Ins/Outs:    In: 864 [P.O.:864]  Out: 8696 [Urine:1650]    PHYSICAL EXAM:  Constitutional: improved, BMI 65  EENT: On BiPAP, trach scar present  Neck: short neck  Respiratory: labored breathing; clear to auscultation with scattered wheezes  Cardiovascular:  Regular rate   Abdomen: Soft, nontender  Extremities: Bilateral lower extremity edema, minimal pitting, no calf tenderness    MEDICATIONS:  Scheduled Meds:   bumetanide  2 mg IntraVENous BID    docusate sodium  100 mg Oral Daily    gabapentin  400 mg Oral TID    cefTRIAXone (ROCEPHIN) IV  2,000 mg IntraVENous Q24H    dexamethasone  6 mg IntraVENous Q24H    famotidine  20 mg Oral BID    cetirizine  10 mg Oral Daily    fluticasone  1 spray Nasal Daily    sodium chloride flush  5-40 mL IntraVENous 2 times per day    insulin lispro  0-12 Units SubCUTAneous TID WC    insulin lispro  0-6 Units SubCUTAneous Nightly    ipratropium-albuterol  1 ampule Inhalation 4x daily    heparin (porcine)  5,000 Units SubCUTAneous 3 times per day    guaiFENesin  600 mg Oral BID       Continuous Infusions:   sodium chloride      dextrose         PRN Meds:   oxyCODONE-acetaminophen, 1 tablet, Q4H PRN   And  oxyCODONE, 5 mg, Q4H PRN  benzonatate, 100 mg, TID PRN  albuterol, 2.5 mg, Q6H PRN  sodium chloride flush, 5-40 mL, PRN  sodium chloride, , PRN  ondansetron, 4 mg, Q8H PRN   Or  ondansetron, 4 mg, Q6H PRN  polyethylene glycol, 17 g, Daily PRN  acetaminophen, 650 mg, Q6H PRN   Or  acetaminophen, 650 mg, Q6H PRN  potassium chloride, 40 mEq, PRN   Or  potassium alternative oral replacement, 40 mEq, PRN   Or  potassium chloride, 10 mEq, PRN  potassium chloride, 10 mEq, PRN  magnesium sulfate, 2,000 mg, PRN  glucose, 4 tablet, PRN  dextrose bolus, 125 mL, PRN   Or  dextrose bolus, 250 mL, PRN  glucagon (rDNA), 1 mg, PRN  dextrose, 100 mL/hr, PRN        SUPPORT DEVICES: [] Ventilator [x] BIPAP  [] Nasal Cannula [] Room Air    Vent Information  Ventilator ID: Soto [id number]  Additional Respiratory Assessments  Heart Rate: 95  Resp: 19  SpO2: 91 %  Humidification Source: Heated wire  Humidification Temp: [address] Condensation: Drained  Lab Results   Component Value Date    MODE NOT REPORTED 02/22/2019       ABGs:   Arterial Blood Gas result:  pH 7.316; pCO2 70.2 pO2 34.8 ; HCO3 34.8;; %O2 Sat 95%. DATA:  Complete Blood Count:   Recent Labs     06/09/22  1637 06/10/22  0706 06/11/22  0437   WBC 11.8* 10.7 11.6*   RBC 3.97 3.61* 4.05   HGB 10.7* 9.8* 10.7*   HCT 36.5 32.8* 36.7   MCV 91.9 90.9 90.6   MCH 27.0 27.1 26.4   MCHC 29.3 29.9 29.2   RDW 14.6* 14.4 14.5*    229 261   MPV 11.3 11.0 11.1        Last 3 Blood Glucose:   Recent Labs     06/09/22  1741 06/10/22  0706 06/11/22  0437   GLUCOSE 241* 192* 187*        PT/INR:    Lab Results   Component Value Date    PROTIME 10.6 06/29/2021    INR 1.0 06/29/2021     PTT:    Lab Results   Component Value Date    APTT 20.1 06/29/2021       Basic Metabolic Profile:   Recent Labs     06/09/22  1741 06/10/22  0706 06/11/22  0437    138 139   K 4.0 3.9 4.0   CL 95* 96* 95*   CO2 32* 30 32*   BUN 28* 36* 39*   CREATININE 1.09* 1.05* 0.98*   GLUCOSE 241* 192* 187*       Liver Function:  No results for input(s): PROT, LABALBU, ALT, AST, GGT, ALKPHOS, BILITOT in the last 72 hours.     Magnesium:   Lab Results   Component Value Date    MG 1.8 03/27/2022    MG 1.6 03/25/2022    MG 1.5 03/23/2022     Phosphorus:   Lab Results   Component Value Date    PHOS 2.4 02/22/2019    PHOS 3.8 02/24/2018    PHOS 2.6 02/22/2018     Ionized Calcium:   Lab Results   Component Value Date    CAION 1.05 02/22/2019    CAION 1.11 02/22/2018    CAION 1.15 02/06/2018        Urinalysis:   Lab Results   Component Value Date    NITRU NEGATIVE 03/27/2022    COLORU Yellow 03/27/2022    PHUR 7.5 03/27/2022    WBCUA 2 TO 5 03/27/2022    RBCUA 50  03/27/2022    MUCUS NOT REPORTED 05/08/2019    TRICHOMONAS NOT REPORTED 05/08/2019    YEAST FEW 03/27/2022    BACTERIA FEW 05/08/2019    SPECGRAV 1.009 03/27/2022    LEUKOCYTESUR SMALL 03/27/2022    UROBILINOGEN Normal 03/27/2022 BILIRUBINUR NEGATIVE 03/27/2022    GLUCOSEU NEGATIVE 03/27/2022    KETUA NEGATIVE 03/27/2022    AMORPHOUS NOT REPORTED 05/08/2019       HgBA1c:    Lab Results   Component Value Date    LABA1C 6.8 06/08/2022     TSH:    Lab Results   Component Value Date    TSH 2.36 02/23/2019     Lactic Acid: No results found for: LACTA   Troponin: No results for input(s): TROPONINI in the last 72 hours. Other Labs:  Results for orders placed or performed during the hospital encounter of 06/08/22   Respiratory Panel, Molecular, with COVID-19 (Restricted: peds pts or suitable admitted adults)    Specimen: Nasopharyngeal Swab   Result Value Ref Range    Specimen Description . NASOPHARYNGEAL SWAB     Adenovirus PCR Not Detected Not Detected    Coronavirus 229E PCR Not Detected Not Detected    Coronavirus HKU1 PCR Not Detected Not Detected    Coronavirus NL63 PCR Not Detected Not Detected    Coronavirus OC43 PCR Not Detected Not Detected    SARS-CoV-2, PCR DETECTED (A) Not Detected    Human Metapneumovirus PCR Not Detected Not Detected    Rhino/Enterovirus PCR Not Detected Not Detected    Influenza A by PCR Not Detected Not Detected    Influenza B by PCR Not Detected Not Detected    Parainfluenza 1 PCR Not Detected Not Detected    Parainfluenza 2 PCR Not Detected Not Detected    Parainfluenza 3 PCR Not Detected Not Detected    Parainfluenza 4 PCR Not Detected Not Detected    Resp Syncytial Virus PCR Not Detected Not Detected    Bordetella Parapertussis Not Detected Not Detected    B Pertussis by PCR Not Detected Not Detected    Chlamydia pneumoniae By PCR Not Detected Not Detected    Mycoplasma pneumo by PCR Not Detected Not Detected   CBC with Auto Differential   Result Value Ref Range    WBC 10.1 3.5 - 11.3 k/uL    RBC 3.90 (L) 3.95 - 5.11 m/uL    Hemoglobin 10.5 (L) 11.9 - 15.1 g/dL    Hematocrit 35.7 (L) 36.3 - 47.1 %    MCV 91.5 82.6 - 102.9 fL    MCH 26.9 25.2 - 33.5 pg    MCHC 29.4 28.4 - 34.8 g/dL    RDW 14.7 (H) 11.8 - 14.4 % Platelets 335 143 - 458 k/uL    MPV 10.8 8.1 - 13.5 fL    NRBC Automated 0.0 0.0 per 100 WBC    Seg Neutrophils 85 (H) 36 - 65 %    Lymphocytes 8 (L) 24 - 43 %    Monocytes 6 3 - 12 %    Eosinophils % 0 (L) 1 - 4 %    Basophils 0 0 - 2 %    Immature Granulocytes 1 (H) 0 %    Segs Absolute 8.56 (H) 1.50 - 8.10 k/uL    Absolute Lymph # 0.79 (L) 1.10 - 3.70 k/uL    Absolute Mono # 0.63 0.10 - 1.20 k/uL    Absolute Eos # <0.03 0.00 - 0.44 k/uL    Basophils Absolute <0.03 0.00 - 0.20 k/uL    Absolute Immature Granulocyte 0.09 0.00 - 0.30 k/uL    RBC Morphology ANISOCYTOSIS PRESENT    Basic Metabolic Panel w/ Reflex to MG   Result Value Ref Range    Glucose 180 (H) 70 - 99 mg/dL    BUN 16 6 - 20 mg/dL    CREATININE 0.87 0.50 - 0.90 mg/dL    Calcium 9.2 8.6 - 10.4 mg/dL    Sodium 139 135 - 144 mmol/L    Potassium 3.8 3.7 - 5.3 mmol/L    Chloride 94 (L) 98 - 107 mmol/L    CO2 30 20 - 31 mmol/L    Anion Gap 15 9 - 17 mmol/L    GFR Non-African American >60 >60 mL/min    GFR African American >60 >60 mL/min    GFR Comment         Troponin   Result Value Ref Range    Troponin, High Sensitivity 12 0 - 14 ng/L   Brain Natriuretic Peptide   Result Value Ref Range    Pro-BNP 4,082 (H) <300 pg/mL   Troponin   Result Value Ref Range    Troponin, High Sensitivity 13 0 - 14 ng/L   ELECTROLYTES PLUS   Result Value Ref Range    POC Sodium 141 138 - 146 mmol/L    POC Potassium 3.6 3.5 - 4.5 mmol/L    POC Chloride 100 98 - 107 mmol/L    POC TCO2 35 (H) 22 - 30 mmol/L    Anion Gap 7 7 - 16 mmol/L   Hemoglobin and hematocrit, blood   Result Value Ref Range    POC Hemoglobin 14.0 12.0 - 16.0 g/dL    POC Hematocrit 41 36 - 46 %   CALCIUM, IONIC (POC)   Result Value Ref Range    POC Ionized Calcium 1.12 (L) 1.15 - 1.33 mmol/L   Hemoglobin A1C   Result Value Ref Range    Hemoglobin A1C 6.8 (H) 4.0 - 6.0 %    Estimated Avg Glucose 148 mg/dL   Procalcitonin   Result Value Ref Range    Procalcitonin 0.19 (H) <0.09 ng/mL   BLOOD GAS, VENOUS Result Value Ref Range    pH, Zaid 7.316 (L) 7.320 - 7.420    pCO2, Zaid 70.2 (H) 39 - 55    pO2, Zaid 34.8 30 - 50    HCO3, Venous 34.8 (H) 24 - 30 mmol/L    Positive Base Excess, Zaid 7.3 (H) 0.0 - 2.0 mmol/L    O2 Sat, Zaid 59.1 (L) 60.0 - 85.0 %    Carboxyhemoglobin 1.4 0 - 5 %    Pt Temp 37.0     FIO2 UNKNOWN    CBC with Auto Differential   Result Value Ref Range    WBC 11.8 (H) 3.5 - 11.3 k/uL    RBC 3.97 3.95 - 5.11 m/uL    Hemoglobin 10.7 (L) 11.9 - 15.1 g/dL    Hematocrit 36.5 36.3 - 47.1 %    MCV 91.9 82.6 - 102.9 fL    MCH 27.0 25.2 - 33.5 pg    MCHC 29.3 28.4 - 34.8 g/dL    RDW 14.6 (H) 11.8 - 14.4 %    Platelets 554 489 - 702 k/uL    MPV 11.3 8.1 - 13.5 fL    NRBC Automated 0.0 0.0 per 100 WBC    Immature Granulocytes 4 (H) 0 %    Seg Neutrophils 87 (H) 36 - 66 %    Lymphocytes 6 (L) 24 - 44 %    Monocytes 3 1 - 7 %    Eosinophils % 0 (L) 1 - 4 %    Basophils 0 0 - 2 %    Absolute Immature Granulocyte 0.47 (H) 0.00 - 0.30 k/uL    Segs Absolute 10.27 (H) 1.8 - 7.7 k/uL    Absolute Lymph # 0.71 (L) 1.0 - 4.8 k/uL    Absolute Mono # 0.35 0.1 - 0.8 k/uL    Absolute Eos # 0.00 0.0 - 0.4 k/uL    Basophils Absolute 0.00 0.0 - 0.2 k/uL    Morphology ANISOCYTOSIS PRESENT    SPECIMEN REJECTION   Result Value Ref Range    Specimen Source . BLOOD     Ordered Test CDP, BMPX     Reason for Rejection Unable to perform testing: Specimen clotted. SPECIMEN REJECTION   Result Value Ref Range    Specimen Source . BLOOD     Ordered Test BMPX     Reason for Rejection Unable to perform testing: Specimen hemolyzed.     Basic Metabolic Panel w/ Reflex to MG   Result Value Ref Range    Glucose 241 (H) 70 - 99 mg/dL    BUN 28 (H) 6 - 20 mg/dL    CREATININE 1.09 (H) 0.50 - 0.90 mg/dL    Calcium 8.6 8.6 - 10.4 mg/dL    Sodium 139 135 - 144 mmol/L    Potassium 4.0 3.7 - 5.3 mmol/L    Chloride 95 (L) 98 - 107 mmol/L    CO2 32 (H) 20 - 31 mmol/L    Anion Gap 12 9 - 17 mmol/L    GFR Non-African American 54 (L) >60 mL/min    GFR  American >60 >60 mL/min    GFR Comment         Basic Metabolic Panel w/ Reflex to MG   Result Value Ref Range    Glucose 192 (H) 70 - 99 mg/dL    BUN 36 (H) 6 - 20 mg/dL    CREATININE 1.05 (H) 0.50 - 0.90 mg/dL    Calcium 8.6 8.6 - 10.4 mg/dL    Sodium 138 135 - 144 mmol/L    Potassium 3.9 3.7 - 5.3 mmol/L    Chloride 96 (L) 98 - 107 mmol/L    CO2 30 20 - 31 mmol/L    Anion Gap 12 9 - 17 mmol/L    GFR Non-African American 56 (L) >60 mL/min    GFR African American >60 >60 mL/min    GFR Comment         CBC with Auto Differential   Result Value Ref Range    WBC 10.7 3.5 - 11.3 k/uL    RBC 3.61 (L) 3.95 - 5.11 m/uL    Hemoglobin 9.8 (L) 11.9 - 15.1 g/dL    Hematocrit 32.8 (L) 36.3 - 47.1 %    MCV 90.9 82.6 - 102.9 fL    MCH 27.1 25.2 - 33.5 pg    MCHC 29.9 28.4 - 34.8 g/dL    RDW 14.4 11.8 - 14.4 %    Platelets 910 510 - 272 k/uL    MPV 11.0 8.1 - 13.5 fL    NRBC Automated 0.0 0.0 per 100 WBC    Seg Neutrophils 83 (H) 36 - 65 %    Lymphocytes 9 (L) 24 - 43 %    Monocytes 7 3 - 12 %    Eosinophils % 0 (L) 1 - 4 %    Basophils 0 0 - 2 %    Immature Granulocytes 1 (H) 0 %    Segs Absolute 8.85 (H) 1.50 - 8.10 k/uL    Absolute Lymph # 0.98 (L) 1.10 - 3.70 k/uL    Absolute Mono # 0.74 0.10 - 1.20 k/uL    Absolute Eos # <0.03 0.00 - 0.44 k/uL    Basophils Absolute <0.03 0.00 - 0.20 k/uL    Absolute Immature Granulocyte 0.06 0.00 - 0.30 k/uL   Basic Metabolic Panel w/ Reflex to MG   Result Value Ref Range    Glucose 187 (H) 70 - 99 mg/dL    BUN 39 (H) 6 - 20 mg/dL    CREATININE 0.98 (H) 0.50 - 0.90 mg/dL    Calcium 8.8 8.6 - 10.4 mg/dL    Sodium 139 135 - 144 mmol/L    Potassium 4.0 3.7 - 5.3 mmol/L    Chloride 95 (L) 98 - 107 mmol/L    CO2 32 (H) 20 - 31 mmol/L    Anion Gap 12 9 - 17 mmol/L    GFR Non-African American >60 >60 mL/min    GFR African American >60 >60 mL/min    GFR Comment         CBC with Auto Differential   Result Value Ref Range    WBC 11.6 (H) 3.5 - 11.3 k/uL    RBC 4.05 3.95 - 5.11 m/uL    Hemoglobin 10.7 (L) 11.9 - 15.1 g/dL    Hematocrit 36.7 36.3 - 47.1 %    MCV 90.6 82.6 - 102.9 fL    MCH 26.4 25.2 - 33.5 pg    MCHC 29.2 28.4 - 34.8 g/dL    RDW 14.5 (H) 11.8 - 14.4 %    Platelets 804 130 - 956 k/uL    MPV 11.1 8.1 - 13.5 fL    NRBC Automated 0.0 0.0 per 100 WBC    Seg Neutrophils 82 (H) 36 - 65 %    Lymphocytes 10 (L) 24 - 43 %    Monocytes 7 3 - 12 %    Eosinophils % 0 (L) 1 - 4 %    Basophils 0 0 - 2 %    Immature Granulocytes 1 (H) 0 %    Segs Absolute 9.45 (H) 1.50 - 8.10 k/uL    Absolute Lymph # 1.16 1.10 - 3.70 k/uL    Absolute Mono # 0.83 0.10 - 1.20 k/uL    Absolute Eos # <0.03 0.00 - 0.44 k/uL    Basophils Absolute <0.03 0.00 - 0.20 k/uL    Absolute Immature Granulocyte 0.10 0.00 - 0.30 k/uL    RBC Morphology ANISOCYTOSIS PRESENT    Venous Blood Gas, POC   Result Value Ref Range    pH, Zaid 7.361 7.320 - 7.430    pCO2, Zaid 60.6 (H) 41.0 - 51.0 mm Hg    pO2, Zaid 34.8 30.0 - 50.0 mm Hg    HCO3, Venous 34.3 (H) 22.0 - 29.0 mmol/L    Positive Base Excess, Zaid 7 (H) 0.0 - 3.0    O2 Sat, Zaid 62 60.0 - 85.0 %   Creatinine W/GFR Point of Care   Result Value Ref Range    POC Creatinine 0.89 0.51 - 1.19 mg/dL    GFR Comment >60 >60 mL/min    GFR Non-African American >60 >60 mL/min    GFR Comment         POCT urea (BUN)   Result Value Ref Range    POC BUN 16 8 - 26 mg/dL   Lactic Acid, POC   Result Value Ref Range    POC Lactic Acid 1.08 0.56 - 1.39 mmol/L   POCT Glucose   Result Value Ref Range    POC Glucose 191 (H) 74 - 100 mg/dL   POC Glucose Fingerstick   Result Value Ref Range    POC Glucose 199 (H) 65 - 105 mg/dL   POC Glucose Fingerstick   Result Value Ref Range    POC Glucose 211 (H) 65 - 105 mg/dL   POC Glucose Fingerstick   Result Value Ref Range    POC Glucose 198 (H) 65 - 105 mg/dL   POC Glucose Fingerstick   Result Value Ref Range    POC Glucose 220 (H) 65 - 105 mg/dL   POC Glucose Fingerstick   Result Value Ref Range    POC Glucose 204 (H) 65 - 105 mg/dL   POC Glucose Fingerstick   Result Value Ref Range    POC Glucose 179 (H) 65 - 105 mg/dL   POC Glucose Fingerstick   Result Value Ref Range    POC Glucose 240 (H) 65 - 105 mg/dL   POC Glucose Fingerstick   Result Value Ref Range    POC Glucose 205 (H) 65 - 105 mg/dL   POC Glucose Fingerstick   Result Value Ref Range    POC Glucose 175 (H) 65 - 105 mg/dL   POC Glucose Fingerstick   Result Value Ref Range    POC Glucose 180 (H) 65 - 105 mg/dL   POC Glucose Fingerstick   Result Value Ref Range    POC Glucose 226 (H) 65 - 105 mg/dL   POC Glucose Fingerstick   Result Value Ref Range    POC Glucose 228 (H) 65 - 105 mg/dL   POC Glucose Fingerstick   Result Value Ref Range    POC Glucose 143 (H) 65 - 105 mg/dL   POC Glucose Fingerstick   Result Value Ref Range    POC Glucose 211 (H) 65 - 105 mg/dL   EKG 12 Lead   Result Value Ref Range    Ventricular Rate 99 BPM    Atrial Rate 99 BPM    P-R Interval 148 ms    QRS Duration 84 ms    Q-T Interval 360 ms    QTc Calculation (Bazett) 462 ms    P Axis 46 degrees    R Axis 53 degrees    T Axis -17 degrees     Radiology/Imaging:  VL DUP LOWER EXTREMITY VENOUS BILATERAL   Final Result      XR CHEST PORTABLE   Final Result   Cardiomegaly mild vascular congestion similar to prior.            ASSESSMENT:     Patient Active Problem List    Diagnosis Date Noted    COVID-19 virus infection 06/08/2022    Acute on chronic diastolic congestive heart failure ([address] 06/08/2022    Respiratory distress     Diarrhea 04/19/2022    Cor pulmonale, chronic ([facility] [address] 04/17/2022    CHF (congestive heart failure), NYHA class I, acute on chronic, combined ([address] 04/08/2022    Prediabetes 03/19/2022    Hyperlipidemia 03/19/2022    Hypokalemia 03/19/2022    Hypomagnesemia 03/19/2022    Dyspnea 03/18/2022    Type 2 diabetes mellitus ([address] 05/06/2019    Dizziness 05/06/2019    Normocytic anemia 05/06/2019    Acute on chronic congestive heart failure ([address]     Right heart failure, unspecified ([facility] [address] 02/22/2019    Acute on chronic diastolic heart failure ([facility] [address] 02/22/2019    Muscle spasm 11/19/2018    Chronic narcotic dependence ([address] 11/16/2018    Chronically on benzodiazepine therapy 11/16/2018    Neuropathy 11/16/2018    Epigastric pain 11/16/2018    Chronic respiratory failure ([address] 09/16/2018    Pulmonary hypertension, moderate to severe ([address] 06/09/2018    Morbid obesity with BMI of 50.0-59.9, adult ([address]     Obesity hypoventilation syndrome ([address]     H/O tracheostomy     STEPH (obstructive sleep apnea)     Chest pain 06/08/2018    Acute non-recurrent pansinusitis     Status post tracheostomy ([address] 02/17/2018    Status post insertion of percutaneous endoscopic gastrostomy (PEG) tube ([address] 02/17/2018    Rhinovirus infection     Encounter for PEG (percutaneous endoscopic gastrostomy) ([address]     Fever     Disease due to rhinovirus     ARDS (adult respiratory distress syndrome) ([address] 02/02/2018    COPD exacerbation ([address]     NSTEMI (non-ST elevated myocardial infarction) ([address] 01/27/2018    Acute pulmonary edema ([address] 01/27/2018    Hypertensive emergency 01/27/2018    Acute respiratory failure with hypoxia and hypercapnia ([address] 01/27/2018    Smoker 01/27/2018    Morbid obesity ([address] 01/27/2018    SOB (shortness of breath) 01/27/2018    Pneumonia of both lower lobes due to infectious organism 01/26/2018    Asthma 09/21/2014    Pneumonia 09/21/2014    HTN (hypertension) 09/21/2014    Torn meniscus 09/21/2014      PLAN:     51-year-old female with acute on chronic hypercapnic hypoxic respiratory failure requiring max BiPAP with superimposed COVID, CHF and COPD, severe pulmonary hypertension    PLAN/MEDICAL DECISION MAKING:  Neurologic:   Patient is neurologically intact, no focal deficits. Not on any sedation.      Cardiovascular:  · COVID-induced respiratory failure  · Continue BiPAP  · CHF  · Nitroglycerin as needed for chest pain  · Bumetanide 1mg bid > increased to home dose bumex 2mg bid    Pulmonary:  · Patient on BiPAP, 100% FiO2, last PCO2 70. Patient's baseline is 60-80 at home. Vent Information  · Ventilator ID: Soto [id number]   · Patient saturating 88% to low 90s on 100% FiO2. Can tolerate periods on HFNC FiO2 100%, 40L/min  · Severe pulmonary hypertension  · Possible COPD exacerbation: duoneb, tessalon, mucinex  · Patient on Rocephin, day 4    GI/Nutrition  · Pepcid for GI prophylaxis  · Diabetic diet as tolerated. · Patient reports a lot of discomfort due to constipation. Pain started last night. Will add bisacodyl suppository. Patient refused enema due to pain. Will manually disimpact if glycolax, docusate, and suppository don't work. Renal/Fluid/Electrolyte  · Patient on bumetanide, increased dose to home dose  · Continue to monitor urine output  · 0.5 cc/kg/h with 1 unmeasured instances  · Replete electrolytes as needed    ID  WBC:   Lab Results   Component Value Date    WBC 11.6 (H) 2022     Tmax: Temp (24hrs), Av.7 °F (36.5 °C), Min:97.2 °F (36.2 °C), Max:98 °F (36.7 °C)  ·   · Patient has been afebrile, hemodynamically stable, WBC 11.6. · Continue Rocephin daily for 5 days until 2022. Hematology:  Recent Labs     22  1637 06/10/22  0706 22  0437   HGB 10.7* 9.8* 10.7*   ·  stable  Endocrine:   glucose controlled - most recent BGL is   Recent Labs     22  1741 06/10/22  0706 22  0437   GLUCOSE 241* 192* 187*     DVT Prophylaxis  · Patient is on heparin for DVT prophylaxis.   Discharge Needs:  PT, OT, ST, SW and Case Management      CODE STATUS: Full Code    DISPOSITION:  [x] To remain ICU  [] OK for out of ICU from MD DEVANTE Anna Resident  22 1:02 PM

## 2022-06-12 NOTE — PROGRESS NOTES
Critical Care Team - Daily Progress Note      Date and time: 2022 8:58 AM  Patient's name:  Jane Emery  Medical Record Number: [id number]  Patient's account/billing number: [de-identified]  Patient's YOB: 1975  Age: 55 y.o. Date of Admission: 2022  6:12 AM  Length of stay during current admission: 4      Primary Care Physician: Iliana Quiros DO  ICU Attending Physician: Dr. Simona Garcia Status: Full Code    Reason for ICU admission: COVID       SUBJECTIVE:     OVERNIGHT EVENTS:   Patient was satting in the 80s overnight. Refusing intubation. This morning patient sats worsened in 70s. Patient then was accepting intubation. Patient was intubated this morning. Placed on propofol and fentanyl for sedation. Tolerated procedure well    Mentation: sedated     Fever:-  Temp (24hrs), Av.1 °F (36.7 °C), Min:97.8 °F (36.6 °C), Max:99.2 °F (37.3 °C)    Secretions:clear      Systolic (78MXC), JUO:442 , Min:130 , TAJ:007     Diastolic (85OKP), RZZ:12, Min:81, Max:104        Urine output in the last 24 hours:  In: 0663 [P.O.:1142]  Out: 2700 [Urine:2700]  Net since admission:-3.3L  Patient Vitals for the past 96 hrs (Last 3 readings):   Weight   06/10/22 0457 (!) 334 lb 6.4 oz (151.7 kg)   22 1600 (!) 333 lb 8 oz (151.3 kg)   22 1559 (!) 334 lb 14.1 oz (151.9 kg)          ABG/Ventilator settings   FIO2 100, RR 26 ,PEEP 15,[id number]            AWAKE & FOLLOWING COMMANDS:  [] No   [] Yes    CURRENT VENTILATION STATUS:     [x] Ventilator  [] BIPAP  [] Nasal Cannula [] Room Air        SECRETIONS Amount:  [x] Small [] Moderate  [] Large  [x] None  Color:     [] White [] Colored  [] Bloody    SEDATION:  RAAS Score:  [x] Propofol gtt  [] Versed gtt  [] Ativan gtt   [] No Sedation    PARALYZED:  [x] No    [] Yes    DIARRHEA:                [x] No                [] Yes  (C. Difficile status: [] positive [] negative                                                                                                                     [] pending)    VASOPRESSORS:  [x] No    [] Yes    If yes -   [] Levophed       [] Dopamine     [] Vasopressin       [] Dobutamine  [] Phenylephrine         [] Epinephrine    CENTRAL LINES:     [x] No   [] Yes   (Date of Insertion:   )           If yes -     [] Right IJ     [] Left IJ [] Right Femoral [] Left Femoral                   [] Right Subclavian [] Left Subclavian       PASTRANA'S CATHETER:   [x] No   [] Yes  (Date of Insertion:   )     URINE OUTPUT:            [] Good   [x] Low              [] Anuric      OBJECTIVE:     VITAL SIGNS:  BP (!) 150/104   Pulse (!) 107   Temp 99.2 °F (37.3 °C) (Axillary)   Resp 30   Ht 5' (1.524 m)   Wt (!) 334 lb 6.4 oz (151.7 kg)   SpO2 (!) 88%   BMI 65.31 kg/m²   Tmax over 24 hours:  Temp (24hrs), Av.1 °F (36.7 °C), Min:97.8 °F (36.6 °C), Max:99.2 °F (37.3 °C)      Patient Vitals for the past 6 hrs:   BP Temp Temp src Pulse Resp SpO2   22 0600 (!) 150/104 99.2 °F (37.3 °C) Axillary (!) 107 30 (!) 88 %   22 0500 130/85   (!) 102 25 (!) 87 %   22 0400 135/89 97.8 °F (36.6 °C) Axillary 93 (!) 31 90 %   22 0339     26    22 0310     24    22 0300 (!) 143/102   90 28 (!) 89 %         Intake/Output Summary (Last 24 hours) at 2022 0858  Last data filed at 2022 0300  Gross per 24 hour   Intake 1142 ml   Output 2700 ml   Net -1558 ml     Wt Readings from Last 2 Encounters:   06/10/22 (!) 334 lb 6.4 oz (151.7 kg)   22 (!) 334 lb 3.5 oz (151.6 kg)     Body mass index is 65.31 kg/m².         PHYSICAL EXAMINATION:    General appearance - anxious, now sedated  Mental status - sedated  Eyes - pupils equal and reactive, extraocular eye movements intact  Ears - bilateral TM's and external ear canals normal  Nose - normal and patent, no erythema, discharge or polyps  Mouth - mucous membranes moist, pharynx normal without lesions  Neck - supple, no significant adenopathy  Chest - clear to auscultation, no wheezes, rales or rhonchi, symmetric air entry  Heart - normal rate, regular rhythm, normal S1, S2, no murmurs, rubs, clicks or gallops  Abdomen - soft, nontender, nondistended, no masses or organomegaly  Neurological - sedated}  Extremities - peripheral pulses normal, no pedal edema, no clubbing or cyanosis  Skin - normal coloration and turgor, no rashes, no suspicious skin lesions noted      Any additional physical findings:    MEDICATIONS:    Scheduled Meds:   acetylcysteine  600 mg Inhalation BID    midazolam        succinylcholine        etomidate        EPINEPHrine        propofol        bumetanide  2 mg IntraVENous BID    docusate sodium  100 mg Oral Daily    gabapentin  400 mg Oral TID    cefTRIAXone (ROCEPHIN) IV  2,000 mg IntraVENous Q24H    dexamethasone  6 mg IntraVENous Q24H    famotidine  20 mg Oral BID    cetirizine  10 mg Oral Daily    fluticasone  1 spray Nasal Daily    sodium chloride flush  5-40 mL IntraVENous 2 times per day    insulin lispro  0-12 Units SubCUTAneous TID WC    insulin lispro  0-6 Units SubCUTAneous Nightly    ipratropium-albuterol  1 ampule Inhalation 4x daily    heparin (porcine)  5,000 Units SubCUTAneous 3 times per day    guaiFENesin  600 mg Oral BID     Continuous Infusions:   fentaNYL 50 mcg/mL      sodium chloride      dextrose       PRN Meds:   bisacodyl, 10 mg, Daily PRN  oxyCODONE-acetaminophen, 1 tablet, Q4H PRN   And  oxyCODONE, 5 mg, Q4H PRN  albuterol, 2.5 mg, Q6H PRN  sodium chloride flush, 5-40 mL, PRN  sodium chloride, , PRN  ondansetron, 4 mg, Q8H PRN   Or  ondansetron, 4 mg, Q6H PRN  polyethylene glycol, 17 g, Daily PRN  acetaminophen, 650 mg, Q6H PRN   Or  acetaminophen, 650 mg, Q6H PRN  potassium chloride, 40 mEq, PRN   Or  potassium alternative oral replacement, 40 mEq, PRN   Or  potassium chloride, 10 (Details:  )   Endotracheal aspirate:     [] None drawn       [] Negative             []  Positive (Details:  )     Other pertinent Labs:       Radiology/Imaging:     Chest Xray (6/12/2022):    ASSESSMENT:     · Principal Problem:  ·   Acute respiratory failure with hypoxia and hypercapnia (HCC)  · Active Problems:  ·   COVID-19 virus infection  ·   Acute on chronic diastolic congestive heart failure (HCC)  ·   Respiratory distress  ·   Morbid obesity (HCC)  ·   COPD exacerbation (HCC)  ·   Pulmonary hypertension, moderate to severe (HCC)  ·   Morbid obesity with BMI of 50.0-59.9, adult (HCC)  ·   Obesity hypoventilation syndrome (HCC)  ·   STEPH (obstructive sleep apnea)  ·   Type 2 diabetes mellitus ([facility]  ·   Prediabetes  · Resolved Problems:  ·   * No resolved hospital problems. *  ·   ·         PLAN:     WEAN PER PROTOCOL:  [] No   [] Yes  [] N/A    DISCONTINUE ANY LABS:   [] No   [] Yes    ICU PROPHYLAXIS:  Stress ulcer:  [] PPI Agent  [] J3Ahvai [] Sucralfate  [] Other:  VTE:   [] Enoxaparin  [] Unfract. Heparin Subcut  [] EPC Cuffs    NUTRITION:  [] NPO [] Tube Feeding (Specify: ) [] TPN  [] PO (Diet: ADULT DIET; Regular; 3 carb choices (45 gm/meal); 1500 ml)    HOME MEDICATIONS RECONCILED: [] No  [] Yes    INSULIN DRIP:   [] No   [] Yes    CONSULTATION NEEDED:  [] No   [] Yes    FAMILY UPDATED:    [] No   [] Yes    TRANSFER OUT OF ICU:   [] No   [] Yes    ADDITIONAL PLAN:    1. Neurologic:   · Neuro intact  · Neuro checks per protocol  · Sedation: propofol and fentanyl   · Pain management: fentanyl  2. Cardiovascular:  · Hemodynamically stable  · HR  · MAPs  · MAP goal 65  · Off pressors  3. Pulmonary:  · Maintain oxygen sats >92%  · Pulmonary toilet  · CXR: pending  4. GI/Nutrition  · glycolax  · Ulcer Prophylaxis: PPI not indicated  · Diet:ADULT DIET; Regular; 3 carb choices (45 gm/meal); 1500 ml  · Last BM: unk  5.  Renal/Fluid/Electrolyte  · IV Fluids: none  · I/O: In: 1142 [P.O.:1142]  · Out: 2700 [Urine:2700]  · UOP: 2.7L overnight   · BUN/Cr  · Monitor electrolytes, replace PRN   6. ID  WBC:   Lab Results   Component Value Date    WBC 9.2 2022   ·   · Tmax: Temp (24hrs), Av.1 °F (36.7 °C), Min:97.8 °F (36.6 °C), Max:99.2 °F (37.3 °C)  ·   · Antimicrobials: ceftriaxone   ·   7. Hematology:  Recent Labs     06/10/22  0706 22  0437 22  0502   HGB 9.8* 10.7* 11.8*   ·  stable  8. Endocrine:   glucose controlled - most recent BGL is   Recent Labs     22  1741 06/10/22  0706 22  0437   GLUCOSE 241* 192* 187*   ·   9. DVT Prophylaxis  · Heparin        Ya Cedeno MD, M.D.              Critical care resident,  Department of Internal Medicine/ Critical care  [facility])             2022, 8:58 AM

## 2022-06-12 NOTE — PROGRESS NOTES
Date: 6/12/2022  Time: 0745  Patient identity confirmed:  Yes  Indications: Respiratory failure  Preoxygenation: BVM with 100% O2  Laryngoscope size and type Glidescope  Airway introducer used: Yes  Evac: No  Tube size:a 7.0 cuffed  Number of attempts:1   Cords visualized:  [x] Clearly  [] Poorly  Breath sounds present bilaterally: Yes   ETCO2   [x] Positive   Tube secured at 23 at lip  Chest x-ray ordered: Yes  BP: BP: (!) 150/104    Notes:    Procedure performed by: MD Coco Wills CHIKI  9:47 AM

## 2022-06-12 NOTE — PLAN OF CARE
Carrie Arellano 10 notified of increased labor of breathing and HR. Patient becoming anxious and agitated. Patient is crying and insisting I do not leave the room and wants her hand held. Patients sister notified to come in because patient will need to be intubated or made comfort care soon. Palliative care consulted, mucomyst given earlier with minimal relief. Bilateral breath sounds are greatly diminished, bases are absent. Respiratory care has been in room frequently. Patient states she wants to be comfortable but doesn't want to be intubated. I explained to patient that we could make her comfortable but that would have to include intubating, or changing her code status. Patient was addiment that she wants to be a full code.

## 2022-06-12 NOTE — PROCEDURES
PROCEDURE NOTE - ARTERIAL LINE PLACEMENT    PATIENT NAME: Campbell RECORD NO. [id number]  DATE: 6/12/2022  ATTENDING PHYSICIAN:  Raheel Phillips      PREOPERATIVE DIAGNOSIS:  Need for blood pressure monitoring  POSTOPERATIVE DIAGNOSIS:  Same  PROCEDURE PERFORMED: Right Radial Arterial Line Insertion  PERFORMING PHYSICIAN:  Avel Cruz DO  ESTIMATED BLOOD LOSS:  Less than 25 ml  COMPLICATIONS:  None immediately appreciated. DISCUSSION:  Ama Byers is a 55 y.o. female who requires invasive pressure monitoring. The history and physical examination were reviewed and confirmed. CONSENT: Patient was sedated     PROCEDURE:  A timeout was initiated by the bedside nurse and was confirmed by those present. The patient was placed in a supine position. The skin overlying the Right Radial was prepped with chlorhexadine. Through this region, the introducer needle through catheter was inserted into radial artery until pulsatile bright blood was seen in collection tubing. Guidewire was advanced with no resistance. Catheter was advanced into the artery, wire was pulled with brisk bleeding noted. Pressure monitoring setup was connected to the catheter, it aspirated and flushed easily. After successful placement, unfortunately the arterial line was dislodged prior to being secured. Another attempt will be made to obtain arterial access.        Avel Cruz DO  4:46 PM, 6/12/22

## 2022-06-12 NOTE — PLAN OF CARE
Problem: Discharge Planning  Goal: Discharge to home or other facility with appropriate resources  Outcome: Not Progressing  Flowsheets (Taken 6/11/2022 1951)  Discharge to home or other facility with appropriate resources:   Identify barriers to discharge with patient and caregiver   Arrange for needed discharge resources and transportation as appropriate   Identify discharge learning needs (meds, wound care, etc)   Arrange for interpreters to assist at discharge as needed   Refer to discharge planning if patient needs post-hospital services based on physician order or complex needs related to functional status, cognitive ability or social support system     Problem: Pain  Goal: Verbalizes/displays adequate comfort level or baseline comfort level  Outcome: Not Progressing  Flowsheets  Taken 6/12/2022 0000  Verbalizes/displays adequate comfort level or baseline comfort level:   Encourage patient to monitor pain and request assistance   Assess pain using appropriate pain scale   Administer analgesics based on type and severity of pain and evaluate response   Implement non-pharmacological measures as appropriate and evaluate response   Consider cultural and social influences on pain and pain management   Notify Licensed Independent Practitioner if interventions unsuccessful or patient reports new pain  Taken 6/11/2022 2300  Verbalizes/displays adequate comfort level or baseline comfort level:   Encourage patient to monitor pain and request assistance   Assess pain using appropriate pain scale   Administer analgesics based on type and severity of pain and evaluate response   Implement non-pharmacological measures as appropriate and evaluate response   Consider cultural and social influences on pain and pain management   Notify Licensed Independent Practitioner if interventions unsuccessful or patient reports new pain  Taken 6/11/2022 2200  Verbalizes/displays adequate comfort level or baseline comfort level: Encourage patient to monitor pain and request assistance   Assess pain using appropriate pain scale   Administer analgesics based on type and severity of pain and evaluate response   Implement non-pharmacological measures as appropriate and evaluate response   Consider cultural and social influences on pain and pain management   Notify Licensed Independent Practitioner if interventions unsuccessful or patient reports new pain     Problem: Chronic Conditions and Co-morbidities  Goal: Patient's chronic conditions and co-morbidity symptoms are monitored and maintained or improved  Outcome: Not Progressing  Flowsheets (Taken 6/11/2022 1951)  Care Plan - Patient's Chronic Conditions and Co-Morbidity Symptoms are Monitored and Maintained or Improved:   Monitor and assess patient's chronic conditions and comorbid symptoms for stability, deterioration, or improvement   Collaborate with multidisciplinary team to address chronic and comorbid conditions and prevent exacerbation or deterioration   Update acute care plan with appropriate goals if chronic or comorbid symptoms are exacerbated and prevent overall improvement and discharge     Problem: Skin/Tissue Integrity  Goal: Absence of new skin breakdown  Description: 1. Monitor for areas of redness and/or skin breakdown  2. Assess vascular access sites hourly  3. Every 4-6 hours minimum:  Change oxygen saturation probe site  4. Every 4-6 hours:  If on nasal continuous positive airway pressure, respiratory therapy assess nares and determine need for appliance change or resting period.   Outcome: Not Progressing     Problem: Safety - Adult  Goal: Free from fall injury  Outcome: Not Progressing     Problem: ABCDS Injury Assessment  Goal: Absence of physical injury  Outcome: Not Progressing

## 2022-06-12 NOTE — PROGRESS NOTES
Physical Therapy        Physical Therapy Cancel Note      DATE: 2022    NAME: Cassi Powell  MRN: [id number]   : 1975      Patient not seen this date for Physical Therapy due to: Other: Pt inappropriate for PT at this time, Pt intubated and sedated this morning due to sats decreasing into low 70's, will CTA and check back as able.       Electronically signed by Malou Medina PTA on 2022 at 9:52 AM

## 2022-06-13 NOTE — PLAN OF CARE
Problem: Respiratory - Adult  Goal: Achieves optimal ventilation and oxygenation  6/13/2022 1249 by Rachael Mayberry RN  Outcome: Not Progressing  6/13/2022 0800 by Juany Thompson RCP  Outcome: Progressing     Problem: Discharge Planning  Goal: Discharge to home or other facility with appropriate resources  6/13/2022 1249 by Rachael Mayberry RN  Outcome: Not Progressing  6/13/2022 0417 by Abelino Gregory RN  Outcome: Not Progressing  Flowsheets (Taken 6/12/2022 1930)  Discharge to home or other facility with appropriate resources:   Identify barriers to discharge with patient and caregiver   Arrange for needed discharge resources and transportation as appropriate   Identify discharge learning needs (meds, wound care, etc)   Arrange for interpreters to assist at discharge as needed   Refer to discharge planning if patient needs post-hospital services based on physician order or complex needs related to functional status, cognitive ability or social support system     Problem: Pain  Goal: Verbalizes/displays adequate comfort level or baseline comfort level  6/13/2022 1249 by Rachael Mayberry RN  Outcome: Not Progressing  6/13/2022 0417 by Abelino Gregory RN  Outcome: Progressing  Flowsheets (Taken 6/12/2022 1930)  Verbalizes/displays adequate comfort level or baseline comfort level:   Encourage patient to monitor pain and request assistance   Assess pain using appropriate pain scale   Administer analgesics based on type and severity of pain and evaluate response   Implement non-pharmacological measures as appropriate and evaluate response   Consider cultural and social influences on pain and pain management   Notify Licensed Independent Practitioner if interventions unsuccessful or patient reports new pain     Problem: Chronic Conditions and Co-morbidities  Goal: Patient's chronic conditions and co-morbidity symptoms are monitored and maintained or improved  6/13/2022 1249 by Rachael Mayberry RN  Outcome: Not Progressing  Flowsheets (Taken 6/13/2022 0800)  Care Plan - Patient's Chronic Conditions and Co-Morbidity Symptoms are Monitored and Maintained or Improved:   Monitor and assess patient's chronic conditions and comorbid symptoms for stability, deterioration, or improvement   Collaborate with multidisciplinary team to address chronic and comorbid conditions and prevent exacerbation or deterioration  6/13/2022 0417 by Coni Nolasco RN  Outcome: Not Progressing  Flowsheets (Taken 6/12/2022 1930)  Care Plan - Patient's Chronic Conditions and Co-Morbidity Symptoms are Monitored and Maintained or Improved:   Monitor and assess patient's chronic conditions and comorbid symptoms for stability, deterioration, or improvement   Collaborate with multidisciplinary team to address chronic and comorbid conditions and prevent exacerbation or deterioration   Update acute care plan with appropriate goals if chronic or comorbid symptoms are exacerbated and prevent overall improvement and discharge     Problem: Skin/Tissue Integrity  Goal: Absence of new skin breakdown  Description: 1. Monitor for areas of redness and/or skin breakdown  2. Assess vascular access sites hourly  3. Every 4-6 hours minimum:  Change oxygen saturation probe site  4. Every 4-6 hours:  If on nasal continuous positive airway pressure, respiratory therapy assess nares and determine need for appliance change or resting period.   6/13/2022 1249 by Bolivar Perez RN  Outcome: Not Progressing  6/13/2022 0417 by Coni Nolasco RN  Outcome: Progressing     Problem: Safety - Adult  Goal: Free from fall injury  6/13/2022 1249 by Bolivar Perez RN  Outcome: Not Progressing  6/13/2022 0417 by Coni Nolasco RN  Outcome: Progressing     Problem: ABCDS Injury Assessment  Goal: Absence of physical injury  6/13/2022 1249 by Bolivar Perez RN  Outcome: Not Progressing  Flowsheets (Taken 6/13/2022 0800)  Absence of Physical Injury: Implement safety measures based on patient assessment  6/13/2022 0417 by Olive Krishnamurthy RN  Outcome: Progressing     Problem: Confusion  Goal: Confusion, delirium, dementia, or psychosis is improved or at baseline  Description: INTERVENTIONS:  1. Assess for possible contributors to thought disturbance, including medications, impaired vision or hearing, underlying metabolic abnormalities, dehydration, psychiatric diagnoses, and notify attending LIP  2. [place] high risk fall precautions, as indicated  3. Provide frequent short contacts to provide reality reorientation, refocusing and direction  4. Decrease environmental stimuli, including noise as appropriate  5. Monitor and intervene to maintain adequate nutrition, hydration, elimination, sleep and activity  6. If unable to ensure safety without constant attention obtain sitter and review sitter guidelines with assigned personnel  7.  Initiate Psychosocial CNS and Spiritual Care consult, as indicated  6/13/2022 1249 by Jamey Cole RN  Outcome: Not Progressing  6/13/2022 0417 by Olive Krishnamurthy RN  Outcome: Not Progressing  Flowsheets (Taken 6/12/2022 1930)  Effect of thought disturbance (confusion, delirium, dementia, or psychosis) are managed with adequate functional status:   Assess for contributors to thought disturbance, including medications, impaired vision or hearing, underlying metabolic abnormalities, dehydration, psychiatric diagnoses, notify [facility] high risk fall precautions, as indicated   Provide frequent short contacts to provide reality reorientation, refocusing and direction   Decrease environmental stimuli, including noise as appropriate   Monitor and intervene to maintain adequate nutrition, hydration, elimination, sleep and activity   If unable to ensure safety without constant attention obtain sitter and review sitter guidelines with assigned personnel   Initiate Psychosocial Clinical Nurse Specialist and [facility] consult, as indicated

## 2022-06-13 NOTE — PLAN OF CARE
Problem: Discharge Planning  Goal: Discharge to home or other facility with appropriate resources  Outcome: Not Progressing  Flowsheets (Taken 6/12/2022 1930)  Discharge to home or other facility with appropriate resources:   Identify barriers to discharge with patient and caregiver   Arrange for needed discharge resources and transportation as appropriate   Identify discharge learning needs (meds, wound care, etc)   Arrange for interpreters to assist at discharge as needed   Refer to discharge planning if patient needs post-hospital services based on physician order or complex needs related to functional status, cognitive ability or social support system     Problem: Chronic Conditions and Co-morbidities  Goal: Patient's chronic conditions and co-morbidity symptoms are monitored and maintained or improved  Outcome: Not Progressing  Flowsheets (Taken 6/12/2022 1930)  Care Plan - Patient's Chronic Conditions and Co-Morbidity Symptoms are Monitored and Maintained or Improved:   Monitor and assess patient's chronic conditions and comorbid symptoms for stability, deterioration, or improvement   Collaborate with multidisciplinary team to address chronic and comorbid conditions and prevent exacerbation or deterioration   Update acute care plan with appropriate goals if chronic or comorbid symptoms are exacerbated and prevent overall improvement and discharge     Problem: Confusion  Goal: Confusion, delirium, dementia, or psychosis is improved or at baseline  Description: INTERVENTIONS:  1. Assess for possible contributors to thought disturbance, including medications, impaired vision or hearing, underlying metabolic abnormalities, dehydration, psychiatric diagnoses, and notify attending LIP  2. Bailey high risk fall precautions, as indicated  3. Provide frequent short contacts to provide reality reorientation, refocusing and direction  4. Decrease environmental stimuli, including noise as appropriate  5.  Monitor and intervene to maintain adequate nutrition, hydration, elimination, sleep and activity  6. If unable to ensure safety without constant attention obtain sitter and review sitter guidelines with assigned personnel  7.  Initiate Psychosocial CNS and Spiritual Care consult, as indicated  Outcome: Not Progressing  Flowsheets (Taken 6/12/2022 1930)  Effect of thought disturbance (confusion, delirium, dementia, or psychosis) are managed with adequate functional status:   Assess for contributors to thought disturbance, including medications, impaired vision or hearing, underlying metabolic abnormalities, dehydration, psychiatric diagnoses, notify [facility] high risk fall precautions, as indicated   Provide frequent short contacts to provide reality reorientation, refocusing and direction   Decrease environmental stimuli, including noise as appropriate   Monitor and intervene to maintain adequate nutrition, hydration, elimination, sleep and activity   If unable to ensure safety without constant attention obtain sitter and review sitter guidelines with assigned personnel   Initiate Psychosocial Clinical Nurse Specialist and [facility] consult, as indicated

## 2022-06-13 NOTE — CONSULTS
[facility] -   Infectious diseases evaluation  admission date 6/8/2022    reason for consultation:   bandemia    Impression :   Current:  · covid +  · bilat pulm infiltrates- ARDS vs multifocal pneumonia   · P edema  · PULM HYPERTENSION  · COPD  · resp failure and intubated 6/12  · bandemia 6/13    Other:  ·   Discussion / summary of stay / plan of care   ·   Recommendations   · On decadron and ceftriaxone day 5  · Start remdesevir 5 days  · Adjust B per final sp cx   · ARDS protocole - ECMO team on board    Infection Control Recommendations   · Universal Precautions  · Droplet + Isolation      Antimicrobial Stewardship Recommendations   · Simplification of therapy  · Targeted therapy      History of Present Illness:   Initial history:  Anita Retana is a 55y.o.-year-old female obese with a history of severe pulmonary hypertension and usually saturates 88 to 92% at home, comes in with increasing shortness of breath and desaturated in the ER in the low 80s. Tested positive for COVID. Was described to be wheezing in the ER and was maintained on BiPAP. Diuresed initially for concern of CHF exacerbation, 100% FIo2 bipap   CXR shows bilat pulm congestion getting worse - and pt has been on decadron and ceftriaxone since 6/10  Due to increase in WBC 26, ID called - pt has a LGF    Pt had been desaturating and only accepted intubation 6/12 -  Intubated and now desaturating despite ARDS protocole and ECMO team consulted.   Sp yellow thick will be sent for cx  Obese and no cellulitis          Interval changes  6/13/2022   Patient Vitals for the past 8 hrs:   Pulse Resp SpO2   06/13/22 0753 82 24 94 %   06/13/22 0741 83 24 94 %   06/13/22 0600 87 24 91 %   06/13/22 0530   (!) 86 %   06/13/22 0500 90 23 (!) 86 %   06/13/22 0430 87 24 (!) 87 %   06/13/22 0417 86 23 (!) 87 %   06/13/22 0400 89 24 (!) 86 %   06/13/22 0330   (!) 85 %   06/13/22 0300 90 24 (!) 85 %   06/13/22 ([facility] 05/06/2019    Former smoker     quit smoking about 17 months ago/ She has a 22.00 pack-year smoking history    HTN     Hyperglycemia     Hyperlipidemia     Hypertension     Morbid obesity with BMI of 60.0-69.9, adult ([facility]     Neuromuscular disorder ([facility]     Neuropathy Right hand    STEPH on CPAP     DME per Dr. Patric Mcgrath for CPAP of 18 cmh2o    Oxygen dependent     DME 06/2018 for home oxgyen at 2.5-3 lpm ATC    Pedal edema     Pneumonia     Pulmonary hypertension, moderate to severe ([facility] 06/09/2018    Torn meniscus     Wears glasses        Past Surgical  History:     Past Surgical History:   Procedure Laterality Date    ABDOMEN SURGERY      Patient had a PEG tube placed 1/2018, removed 4/2018    [address]    CYSTOSCOPY  June 5, 2019    CYSTOSCOPY N/A 6/5/2019    CYSTOSCOPY performed by [facility]ammell [facility] at [facility]  02/2018    Removed in April 2018    [facility] CATH POWER PICC TRIPLE  2/2/2018         JOINT REPLACEMENT      CT OFFICE/OUTPT VISIT,PROCEDURE ONLY N/A 2/17/2018    TRACHEOTOMY performed by Cristóbal Johnson MD at [address]  03/2018    TRACHEOTOMY  02/2018       Medications:      insulin lispro  0-18 Units SubCUTAneous Q4H    midodrine  5 mg Oral TID WC    bumetanide  2 mg IntraVENous BID    [Held by provider] docusate sodium  100 mg Oral Daily    gabapentin  400 mg Oral TID    dexamethasone  6 mg IntraVENous Q24H    famotidine  20 mg Oral BID    cetirizine  10 mg Oral Daily    fluticasone  1 spray Nasal Daily    sodium chloride flush  5-40 mL IntraVENous 2 times per day    ipratropium-albuterol  1 ampule Inhalation 4x daily    heparin (porcine)  5,000 Units SubCUTAneous 3 times per day    [Held by provider] guaiFENesin  600 mg Oral BID       Social History:     Social History     Socioeconomic History    Marital status: Single     Spouse name: Not on file    Number of children: Not on file    Years of education: Not on file    Highest education level: Not on file   Occupational History    Not on file   Tobacco Use    Smoking status: Former Smoker     Packs/day: 1.00     Years: 22.00     Pack years: 22.00     Types: Cigarettes     Start date: 18     Quit date: 1/15/2018     Years since quittin.4    Smokeless tobacco: Never Used   Vaping Use    Vaping Use: Never used   Substance and Sexual Activity    Alcohol use: No    Drug use: No    Sexual activity: Not Currently   Other Topics Concern    Not on file   Social History Narrative    ** Merged History Encounter **          Social Determinants of Health     Financial Resource Strain: Low Risk     Difficulty of Paying Living Expenses: Not hard at all   Food Insecurity: Food Insecurity Present    Worried About [address] in the Last Year: Never true    Obdulio of Food in the Last Year: Sometimes true   Transportation Needs: No Transportation Needs    Lack of Transportation (Medical): No    Lack of Transportation (Non-Medical): No   Physical Activity: Inactive    Days of Exercise per Week: 0 days    Minutes of Exercise per Session: 0 min   Stress: Stress Concern Present    Feeling of Stress : To some extent   Social Connections: Moderately Integrated    Frequency of Communication with Friends and Family: Three times a week    Frequency of Social Gatherings with Friends and Family:  Three times a week    Attends Hindu Services: 1 to 4 times per year    Active Member of Clubs or Organizations: No    Attends Club or Organization Meetings: 1 to 4 times per year    Marital Status: Never    Intimate Partner Violence: Not At Risk    Fear of Current or Ex-Partner: No    Emotionally Abused: No    Physically Abused: No    Sexually Abused: No   Housing Stability: Unknown    Unable to Pay for Housing in the Last Year: No    Number of [place] in the Last Year: Not on file    Unstable Housing in the Last Year: No Family History:     Family History   Problem Relation Age of Onset    Emphysema Mother    Randolph Alzheimer's Disease Mother     Other Mother         Blood disorder    High Blood Pressure Father     Arthritis Father     Diabetes Sister     Heart Failure Sister     Kidney Disease Sister     High Blood Pressure Brother     Dementia Maternal Grandmother     High Blood Pressure Maternal Grandmother     Dementia Maternal Grandfather     High Blood Pressure Maternal Grandfather     Cancer Paternal Grandmother     Heart Disease Paternal Grandfather     Diabetes Sister     Heart Failure Sister     Other Sister         Intestinal problems    No Known Problems Sister     No Known Problems Son       Medical Decision Making:   I have independently reviewed/ordered the following labs:    CBC with Differential:   Recent Labs     06/12/22  0502 06/13/22  0443   WBC 9.2 26.1*   HGB 11.8* 12.4   HCT 39.1 40.0   PLT See Reflexed IPF Result 352   LYMPHOPCT 15* 5*   MONOPCT 5 7     BMP:  Recent Labs     06/11/22  0437 06/13/22  0443    137   K 4.0 3.5*   CL 95* 91*   CO2 32* 27   BUN 39* 40*   CREATININE 0.98* 1.44*   MG  --  1.7     Hepatic Function Panel: No results for input(s): PROT, LABALBU, BILIDIR, IBILI, BILITOT, ALKPHOS, ALT, AST in the last 72 hours. No results for input(s): RPR in the last 72 hours. No results for input(s): HIV in the last 72 hours. No results for input(s): BC in the last 72 hours. Lab Results   Component Value Date    CREATININE 1.44 06/13/2022    GLUCOSE 245 06/13/2022       Detailed results: Thank you for allowing us to participate in the care of this patient. Please call with questions.     This note is created with the assistance of a speech recognition program.  While intending to generate adocument that actually reflects the content of the visit, the document can still have some errors including those of syntax and sound a like substitutions which may escape proof reading. It such instances, actual meaningcan be extrapolated by contextual diversion.     Lorenzo Lagunas MD  Office: [phone]  Perfect serve / office [phone]

## 2022-06-13 NOTE — PLAN OF CARE
Patients father and sisters have came in to see patient. [place] with them now.  Two at the bedside at any one time

## 2022-06-13 NOTE — PROGRESS NOTES
INTENSIVE CARE UNIT  Resident Physician Progress Note    Patient - Jane Nurse  Date of Admission -  6/8/2022  6:12 AM  Date of Evaluation -  6/13/2022  Room and Bed Number -  3001/3001-01   Hospital Day - 5    SUBJECTIVE:     OVERNIGHT EVENTS:      Per RN, patient's sister Willene Favre is POA. Overnight, patient was satting in the low 80s with high vent settings, tachycardia, and hypotension. Low urine output overnight. ECMO team consulted. Spoke to sister, Willene Favre, who was updated on patient's current condition. Sister unsure whether to wait at waiting room/ bedside/ by their phones and states she lives 15 minutes away and can be at the hospital within 30min. Advised that patient's condition is critical and can rapidly change thus at least have phone handy. Updated on slight improvement in oxygenation saturation and ECMO evaluation.  FLUIDS: 500ml NS given for low UOP with improvement.  FEEDING: dietician consulted for feedings   FAMILY: contacted sister Willene Favre, she said she was POA several years ago when patient needed one. Son is now an adult. Denia is spokesperson for the family. She lives 15 minutes away.    ANALGESICS: prn oxycodone-acetaminophen   SEDATION: Propofol  and Fentanyl   THROMBO- PROPHYLAXIS: Heparin   MOBILIZATION: on vent   HEADS UP: yes   HEMODYNAMICS: norepinephrine 10   ULCER PROPHYLAXIS: Pepcid 20mg bid   GLYCEMIC CONTROL: medium sliding scale   SPONTANEOUS BREATHING TRIAL: satting high 70s overnight, no SBT   BOWEL MANAGEMENT: glycolax daily prn, dosucate 100mg daily, constipated day prior to intubation bisacodyl 10mg daily given with large BM resulting   INDWELLING CATHETER: inserted 6/12/22   DRUG DE-ESCALATION: ceftriaxone 5 day ended 6/12    HISTORY OF PRESENT ILLNESS:    51-year-old female admitted for hypoxic respiratory failure requiring maximum BiPAP settings, 80% FiO2 secondary to COVID induced respiratory failure with acute on chronic CHF and/or COPD exacerbation with pulmonary hypertension. Patient is treating treated with ceftriaxone, diuretics, BiPAP, breathing treatments. 6/11 Patient required continuous BiPAP overnight, FiO2 100%. Patient feels constipated and has a cough. Overall, is feeling better today. Denies any pain during exam, including chest pain. Satting 88% to low 90s when switched to HFNC. *Update: patient reports a lot of discomfort/ pain secondary to constipation. Bisacodyl added. 6/12 patient was satting 80s overnight. She had previously refused intubation, saying she is in the 80s at home. Saturation been accepting of intubation. Patient was intubated and placed on propofol and fentanyl. Saturating 70/ 80s, ARDS protocol with low Tv and high RR started as well as nitric oxide. 6/13   Some improvement to low 90s. ECMO was consulted overnight.   OBJECTIVE:     VITAL SIGNS:  Patient Vitals for the past 8 hrs:   BP Temp Temp src Pulse Resp SpO2   06/13/22 1300    93 28 94 %   06/13/22 1245    91 28 94 %   06/13/22 1230    95 26 95 %   06/13/22 1215    93 27 96 %   06/13/22 1200 110/71 100 °F (37.8 °C) CORE 95 24 95 %   06/13/22 1155    95 26 95 %   06/13/22 1145    96 24 95 %   06/13/22 1130    97 24 95 %   06/13/22 1115    98 24 94 %   06/13/22 1100    99 24 94 %   06/13/22 1045    98 24 91 %   06/13/22 1030    94 24 91 %   06/13/22 1029    98 26 92 %   06/13/22 1028    95 25 90 %   06/13/22 1027    92 20 90 %   06/13/22 1026    90 25 92 %   06/13/22 1025    89 24 91 %   06/13/22 1024    90 25 90 %   06/13/22 1023    89 24 (!) 89 %   06/13/22 1022    89 24 (!) 89 %   06/13/22 1021    90 23 90 %   06/13/22 1020    89 24 91 %   06/13/22 1019    90 23 91 %   06/13/22 1018    90 23 91 %   06/13/22 1017    89 24 91 %   06/13/22 1016    87 24 91 %   06/13/22 1015    88 26 92 %   06/13/22 1014    87 24 92 %   06/13/22 1013    87 24 92 %   06/13/22 1012    87 24 92 %   06/13/22 1011    88 24 92 %   06/13/22 1010    88 24 92 %   06/13/22 1009    84 24 91 %   06/13/22 1008    85 22 93 %   06/13/22 1007    86 24 93 %   06/13/22 1006    87 24 93 %   06/13/22 1005    88 24 93 %   06/13/22 1004    89 24 93 %   06/13/22 1003    85 24 93 %   06/13/22 1002    85 23 93 %   06/13/22 1001    84 24 93 %   06/13/22 1000  99 °F (37.2 °C) CORE 85 24 93 %   06/13/22 0959    85 24 93 %   06/13/22 0958    85 24 93 %   06/13/22 0957    86 24 93 %   06/13/22 0956    85 24 93 %   06/13/22 0955    85 24 93 %   06/13/22 0954    85 24 93 %   06/13/22 0953    85 24 93 %   06/13/22 0952    85 24 92 %   06/13/22 0951    85 24 93 %   06/13/22 0950    85 24 93 %   06/13/22 0949    85 24 93 %   06/13/22 0948    85 24 93 %   06/13/22 0947    85 24 93 %   06/13/22 0946    85 24 93 %   06/13/22 0945    85 24 93 %   06/13/22 0944    86 23 93 %   06/13/22 0943    85 24 93 %   06/13/22 0942    85 24 93 %   06/13/22 0941    85 24 93 %   06/13/22 0940    85 24 93 %   06/13/22 0939    84 24 93 %   06/13/22 0938    85 24 93 %   06/13/22 0937    85 24 93 %   06/13/22 0936    85 24 94 %   06/13/22 0935    84 24 93 %   06/13/22 0934    85 24 94 %   06/13/22 0933    85 24 94 %   06/13/22 0932    84 24 94 %   06/13/22 0931    85 24 94 %   06/13/22 0930    84 24 94 %   06/13/22 0929    85 24 94 %   06/13/22 0928    84 24 94 %   06/13/22 0927    85 24 95 %   06/13/22 0926    84 24 95 %   06/13/22 0925    84 24 94 %   06/13/22 0924    84 24 95 %   06/13/22 0923    84 24 94 %   06/13/22 0922    84 24 94 %   06/13/22 0921    84 24 94 %   06/13/22 0920    84 24 95 %   06/13/22 0919    84 24 94 %   06/13/22 0918    84 24 95 %   06/13/22 0917    84 24 94 %   06/13/22 0916    84 24 95 %   06/13/22 0915    84 24 94 %   06/13/22 0914    85 24 95 % 06/13/22 0913    84 24 95 %   06/13/22 0912    84 24 94 %   06/13/22 0911    84 24 95 %   06/13/22 0910    84 24 95 %   06/13/22 0909    85 24 95 %   06/13/22 0908    85 24 95 %   06/13/22 0907    84 23 93 %   06/13/22 0906    85 24 93 %   06/13/22 0905    85 24 93 %   06/13/22 0904    85 24 93 %   06/13/22 0903    85 24 93 %   06/13/22 0902    85 24 93 %   06/13/22 0901    84 24 93 %   06/13/22 0900    85 24 93 %   06/13/22 0859    84 24 94 %   06/13/22 0858    84 24 94 %   06/13/22 0857    84 24 94 %   06/13/22 0856    84 24 94 %   06/13/22 0855    85 24 94 %   06/13/22 0854    85 24 94 %   06/13/22 0853    84 24 94 %   06/13/22 0852    85 24 94 %   06/13/22 0851    84 23 93 %   06/13/22 0850    85 24 94 %   06/13/22 0849    85 24 94 %   06/13/22 0848    85 24 94 %   06/13/22 0847    85 24 93 %   06/13/22 0846    85 24 93 %   06/13/22 0845    85 24 93 %   06/13/22 0844    86 25 93 %   06/13/22 0843    86 24 92 %   06/13/22 0842    86 22 91 %   06/13/22 0841    85 24 93 %   06/13/22 0840    86 24 92 %   06/13/22 0839    84 23 93 %   06/13/22 0838    85 22 93 %   06/13/22 0837    84 24 92 %   06/13/22 0836    83 21 93 %   06/13/22 0835    85 24 93 %   06/13/22 0834    84 24 94 %   06/13/22 0833    85 24 94 %   06/13/22 0832    84 22 94 %   06/13/22 0831    84 24 93 %   06/13/22 0830    84 24 94 %   06/13/22 0829    84 24 94 %   06/13/22 0828    83 24 94 %   06/13/22 0827    84 24 94 %   06/13/22 0826    84 24 94 %   06/13/22 0825    83 23 94 %   06/13/22 0824    84 24 94 %   06/13/22 0823    84 24 94 %   06/13/22 0822    84 24 94 %   06/13/22 0821    83 24 93 %   06/13/22 0820    83 24 93 %   06/13/22 0819    84 24 93 %   06/13/22 0818    84 24 93 %   06/13/22 0817    84 24 93 %   06/13/22 0816    84 24 94 %   06/13/22 0815    84 24 93 % 06/13/22 0814    84 24 93 %   06/13/22 0813    84 24 93 %   06/13/22 0812    85 24 93 %   06/13/22 0811    84 24 93 %   06/13/22 0810    84 24 93 %   06/13/22 0809    85 24 93 %   06/13/22 0808    84 24 93 %   06/13/22 0807    84 24 93 %   06/13/22 0806    84 24 93 %   06/13/22 0805    84 24 93 %   06/13/22 0804    84 24 93 %   06/13/22 0803    84 24 93 %   06/13/22 0802    84 24 93 %   06/13/22 0801    84 24 93 %   06/13/22 0800 120/71 99 °F (37.2 °C) CORE 85 24 93 %   06/13/22 0759    85 24 92 %   06/13/22 0758    85 24 93 %   06/13/22 0757    87 24 93 %   06/13/22 0756    86 26 93 %   06/13/22 0755    82 24 94 %   06/13/22 0754    83 24 94 %   06/13/22 0753    82 24 94 %   06/13/22 0752    83 24 94 %   06/13/22 0751    84 24 94 %   06/13/22 0750    84 24 94 %   06/13/22 0749    82 24 94 %   06/13/22 0748    82 24 94 %   06/13/22 0747    82 24 94 %   06/13/22 0746    82 24 94 %   06/13/22 0745    83 21 93 %   06/13/22 0744    82 23 94 %   06/13/22 0743    82 24 94 %   06/13/22 0742    82 24 94 %   06/13/22 0741    83 24 94 %   06/13/22 0740    82 24 94 %   06/13/22 0739    82 24 94 %   06/13/22 0738    82 24 94 %   06/13/22 0737    82 24 94 %   06/13/22 0736    83 24 94 %   06/13/22 0735    84 24 94 %   06/13/22 0734    83 24 94 %   06/13/22 0733    83 24 94 %   06/13/22 0732    83 24 93 %   06/13/22 0731    84 24 93 %   06/13/22 0730    83 24 94 %   06/13/22 0729    83 24 94 %   06/13/22 0728    83 24 94 %   06/13/22 0727    83 24 94 %   06/13/22 0726    83 24 94 %   06/13/22 0725    83 24 94 %   06/13/22 0724    82 24 94 %   06/13/22 0723    83 24 94 %   06/13/22 0722    83 24 94 %   06/13/22 0721    83 24 94 %   06/13/22 0720    83 24 94 %   06/13/22 0719    83 24 94 %   06/13/22 0718    83 24 94 %   06/13/22 0717    83 24 94 %   06/13/22 0716    84 24 94 %   22 0715    84 24 94 %   22 0714    84 24 95 %   22 0713    83 24 95 %   22 0712    83 24 95 %   22 0711    84 24 94 %   22 0710    83 24 95 %   22 0709    84 24 95 %   22 0708    84 24 94 %   22 0707    84 24 94 %   22 0706    84 24 95 %   22 0705    84 24 94 %   22 0704    83 24 95 %   22 0703    84 24 94 %   22 0702    84 24 94 %   22 0701    84 24 94 %   22 0700    84 24 94 %   22 0600    87 24 91 %   22 0530      (!) 86 %       Last Body weight:   Wt Readings from Last 3 Encounters:   06/10/22 (!) 334 lb 6.4 oz (151.7 kg)   22 (!) 334 lb 3.5 oz (151.6 kg)   22 (!) 355 lb (161 kg)       Body Mass Index : Body mass index is 65.31 kg/m².       Tmax over 24 hours: Temp (24hrs), Av.3 °F (37.4 °C), Min:99 °F (37.2 °C), Max:100 °F (37.8 °C)      Ins/Outs:   In: 2245.8 [I.V.:1160.7; NG/GT:360]  Out: 1480 [Urine:1480]    PHYSICAL EXAM:  Constitutional: BMI 65  EENT: ventilator, trach scar present  Neck: short neck  Respiratory: auscultation has rhonchi and scattered wheezes  Cardiovascular:  Regular rate   Abdomen: Soft, nontender  Extremities: Bilateral lower extremity edema, minimal pitting    MEDICATIONS:  Scheduled Meds:   insulin lispro  0-18 Units SubCUTAneous Q4H    midodrine  5 mg Oral TID WC    bumetanide  1 mg IntraVENous BID    [START ON 2022] famotidine  20 mg Oral Daily    gabapentin  400 mg Oral BID    [START ON 2022] remdesivir IVPB  100 mg IntraVENous Q24H    [Held by provider] docusate sodium  100 mg Oral Daily    dexamethasone  6 mg IntraVENous Q24H    cetirizine  10 mg Oral Daily    fluticasone  1 spray Nasal Daily    sodium chloride flush  5-40 mL IntraVENous 2 times per day    ipratropium-albuterol  1 ampule Inhalation 4x daily    heparin (porcine)  5,000 Units SubCUTAneous 3 times per day   [facility] by provider] guaiFENesin  600 mg Oral BID       Continuous Infusions:   midazolam 5 mg/hr (06/13/22 1305)    fentaNYL 50 mcg/mL 50 mcg/hr (06/13/22 1305)    propofol Stopped (06/13/22 1223)    norepinephrine 5 mcg/min (06/13/22 1159)    cisatracurium (NIMBEX) infusion 1.5 mcg/kg/min (06/13/22 1305)    sodium chloride 10 mL/hr at 06/13/22 1020    dextrose         PRN Meds:   albuterol, 2.5 mg, Q4H PRN  artificial tears, , PRN  sodium chloride, 30 mL, PRN  bisacodyl, 10 mg, Daily PRN  oxyCODONE-acetaminophen, 1 tablet, Q4H PRN   And  oxyCODONE, 5 mg, Q4H PRN  sodium chloride flush, 5-40 mL, PRN  sodium chloride, , PRN  ondansetron, 4 mg, Q8H PRN   Or  ondansetron, 4 mg, Q6H PRN  polyethylene glycol, 17 g, Daily PRN  acetaminophen, 650 mg, Q6H PRN   Or  acetaminophen, 650 mg, Q6H PRN  potassium chloride, 40 mEq, PRN   Or  potassium alternative oral replacement, 40 mEq, PRN   Or  potassium chloride, 10 mEq, PRN  potassium chloride, 10 mEq, PRN  magnesium sulfate, 2,000 mg, PRN  glucose, 4 tablet, PRN  dextrose bolus, 125 mL, PRN   Or  dextrose bolus, 250 mL, PRN  glucagon (rDNA), 1 mg, PRN  dextrose, 100 mL/hr, PRN        SUPPORT DEVICES: [] Ventilator [x] BIPAP  [] Nasal Cannula [] Room Air    Vent Information  Ventilator ID: [id number] [id number]  Vent Mode: [facility]  Ventilator Initiate: Yes  Additional Respiratory Assessments  Heart Rate: 93  Resp: 28  SpO2: 94 %  End Tidal CO2: 35 (%)  Position: Semi-Blakely's  Humidification Source: Heated wire  Humidification Temp: 37  Circuit Condensation: Drained  Cuff Pressure (cm H2O):  (mlt)  Lab Results   Component Value Date    MODE [facility] 06/13/2022     ABGs:   Arterial Blood Gas result:  pH 7.4; pCO2 45 pO2 61; HCO3 28; %O2 Sat 95%.     DATA:  Complete Blood Count:   Recent Labs     06/11/22  0437 06/12/22  0502 06/13/22  0443   WBC 11.6* 9.2 26.1*   RBC 4.05 4.45 4.57   HGB 10.7* 11.8* 12.4   HCT 36.7 39.1 40.0   MCV 90.6 87.9 87.5   MCH 26.4 26.5 27.1 MCHC 29.2 30.2 31.0   RDW 14.5* 14.3 14.5*    See Reflexed IPF Result 352   MPV 11.1  --  11.3        Last 3 Blood Glucose:   Recent Labs     06/11/22 0437 06/13/22 0443   GLUCOSE 187* 245*        PT/INR:    Lab Results   Component Value Date    PROTIME 10.6 06/29/2021    INR 1.0 06/29/2021     PTT:    Lab Results   Component Value Date    APTT 20.1 06/29/2021       Basic Metabolic Profile:   Recent Labs     06/11/22 0437 06/13/22 0443    137   K 4.0 3.5*   CL 95* 91*   CO2 32* 27   BUN 39* 40*   CREATININE 0.98* 1.44*   GLUCOSE 187* 245*       Liver Function:  No results for input(s): PROT, LABALBU, ALT, AST, GGT, ALKPHOS, BILITOT in the last 72 hours. Magnesium:   Lab Results   Component Value Date    MG 1.7 06/13/2022    MG 1.8 03/27/2022    MG 1.6 03/25/2022     Phosphorus:   Lab Results   Component Value Date    PHOS 2.4 02/22/2019    PHOS 3.8 02/24/2018    PHOS 2.6 02/22/2018     Ionized Calcium:   Lab Results   Component Value Date    CAION 1.05 02/22/2019    CAION 1.11 02/22/2018    CAION 1.15 02/06/2018        Urinalysis:   Lab Results   Component Value Date    NITRU NEGATIVE 06/12/2022    COLORU Yellow 06/12/2022    PHUR 6.0 06/12/2022    WBCUA None 06/12/2022    RBCUA 2 TO 5 06/12/2022    MUCUS NOT REPORTED 05/08/2019    TRICHOMONAS NOT REPORTED 05/08/2019    YEAST FEW 03/27/2022    BACTERIA FEW 05/08/2019    SPECGRAV 1.018 06/12/2022    LEUKOCYTESUR NEGATIVE 06/12/2022    UROBILINOGEN Normal 06/12/2022    BILIRUBINUR NEGATIVE 06/12/2022    GLUCOSEU NEGATIVE 06/12/2022    KETUA NEGATIVE 06/12/2022    AMORPHOUS NOT REPORTED 05/08/2019       HgBA1c:    Lab Results   Component Value Date    LABA1C 6.8 06/08/2022     TSH:    Lab Results   Component Value Date    TSH 2.36 02/23/2019     Lactic Acid: No results found for: LACTA   Troponin: No results for input(s): TROPONINI in the last 72 hours.     Other Labs:  Results for orders placed or performed during the hospital encounter of 06/08/22   Respiratory Panel, Molecular, with COVID-19 (Restricted: peds pts or suitable admitted adults)    Specimen: Nasopharyngeal Swab   Result Value Ref Range    Specimen Description . NASOPHARYNGEAL SWAB     Adenovirus PCR Not Detected Not Detected    Coronavirus 229E PCR Not Detected Not Detected    Coronavirus HKU1 PCR Not Detected Not Detected    Coronavirus NL63 PCR Not Detected Not Detected    Coronavirus OC43 PCR Not Detected Not Detected    SARS-CoV-2, PCR DETECTED (A) Not Detected    Human Metapneumovirus PCR Not Detected Not Detected    Rhino/Enterovirus PCR Not Detected Not Detected    Influenza A by PCR Not Detected Not Detected    Influenza B by PCR Not Detected Not Detected    Parainfluenza 1 PCR Not Detected Not Detected    Parainfluenza 2 PCR Not Detected Not Detected    Parainfluenza 3 PCR Not Detected Not Detected    Parainfluenza 4 PCR Not Detected Not Detected    Resp Syncytial Virus PCR Not Detected Not Detected    Bordetella Parapertussis Not Detected Not Detected    B Pertussis by PCR Not Detected Not Detected    Chlamydia pneumoniae By PCR Not Detected Not Detected    Mycoplasma pneumo by PCR Not Detected Not Detected   CBC with Auto Differential   Result Value Ref Range    WBC 10.1 3.5 - 11.3 k/uL    RBC 3.90 (L) 3.95 - 5.11 m/uL    Hemoglobin 10.5 (L) 11.9 - 15.1 g/dL    Hematocrit 35.7 (L) 36.3 - 47.1 %    MCV 91.5 82.6 - 102.9 fL    MCH 26.9 25.2 - 33.5 pg    MCHC 29.4 28.4 - 34.8 g/dL    RDW 14.7 (H) 11.8 - 14.4 %    Platelets 445 724 - 120 k/uL    MPV 10.8 8.1 - 13.5 fL    NRBC Automated 0.0 0.0 per 100 WBC    Seg Neutrophils 85 (H) 36 - 65 %    Lymphocytes 8 (L) 24 - 43 %    Monocytes 6 3 - 12 %    Eosinophils % 0 (L) 1 - 4 %    Basophils 0 0 - 2 %    Immature Granulocytes 1 (H) 0 %    Segs Absolute 8.56 (H) 1.50 - 8.10 k/uL    Absolute Lymph # 0.79 (L) 1.10 - 3.70 k/uL    Absolute Mono # 0.63 0.10 - 1.20 k/uL    Absolute Eos # <0.03 0.00 - 0.44 k/uL    Basophils Absolute <0.03 0.00 - 0.20 k/uL    Absolute Immature Granulocyte 0.09 0.00 - 0.30 k/uL    RBC Morphology ANISOCYTOSIS PRESENT    Basic Metabolic Panel w/ Reflex to MG   Result Value Ref Range    Glucose 180 (H) 70 - 99 mg/dL    BUN 16 6 - 20 mg/dL    CREATININE 0.87 0.50 - 0.90 mg/dL    Calcium 9.2 8.6 - 10.4 mg/dL    Sodium 139 135 - 144 mmol/L    Potassium 3.8 3.7 - 5.3 mmol/L    Chloride 94 (L) 98 - 107 mmol/L    CO2 30 20 - 31 mmol/L    Anion Gap 15 9 - 17 mmol/L    GFR Non-African American >60 >60 mL/min    GFR African American >60 >60 mL/min    GFR Comment         Troponin   Result Value Ref Range    Troponin, High Sensitivity 12 0 - 14 ng/L   Brain Natriuretic Peptide   Result Value Ref Range    Pro-BNP 4,082 (H) <300 pg/mL   Troponin   Result Value Ref Range    Troponin, High Sensitivity 13 0 - 14 ng/L   ELECTROLYTES PLUS   Result Value Ref Range    POC Sodium 141 138 - 146 mmol/L    POC Potassium 3.6 3.5 - 4.5 mmol/L    POC Chloride 100 98 - 107 mmol/L    POC TCO2 35 (H) 22 - 30 mmol/L    Anion Gap 7 7 - 16 mmol/L   Hemoglobin and hematocrit, blood   Result Value Ref Range    POC Hemoglobin 14.0 12.0 - 16.0 g/dL    POC Hematocrit 41 36 - 46 %   CALCIUM, IONIC (POC)   Result Value Ref Range    POC Ionized Calcium 1.12 (L) 1.15 - 1.33 mmol/L   Hemoglobin A1C   Result Value Ref Range    Hemoglobin A1C 6.8 (H) 4.0 - 6.0 %    Estimated Avg Glucose 148 mg/dL   Procalcitonin   Result Value Ref Range    Procalcitonin 0.19 (H) <0.09 ng/mL   BLOOD GAS, VENOUS   Result Value Ref Range    pH, Zaid 7.316 (L) 7.320 - 7.420    pCO2, Zaid 70.2 (H) 39 - 55    pO2, Zaid 34.8 30 - 50    HCO3, Venous 34.8 (H) 24 - 30 mmol/L    Positive Base Excess, Zaid 7.3 (H) 0.0 - 2.0 mmol/L    O2 Sat, Zaid 59.1 (L) 60.0 - 85.0 %    Carboxyhemoglobin 1.4 0 - 5 %    Pt Temp 37.0     FIO2 UNKNOWN    CBC with Auto Differential   Result Value Ref Range    WBC 11.8 (H) 3.5 - 11.3 k/uL    RBC 3.97 3.95 - 5.11 m/uL    Hemoglobin 10.7 (L) 11.9 - 15.1 g/dL Hematocrit 36.5 36.3 - 47.1 %    MCV 91.9 82.6 - 102.9 fL    MCH 27.0 25.2 - 33.5 pg    MCHC 29.3 28.4 - 34.8 g/dL    RDW 14.6 (H) 11.8 - 14.4 %    Platelets 121 781 - 237 k/uL    MPV 11.3 8.1 - 13.5 fL    NRBC Automated 0.0 0.0 per 100 WBC    Immature Granulocytes 4 (H) 0 %    Seg Neutrophils 87 (H) 36 - 66 %    Lymphocytes 6 (L) 24 - 44 %    Monocytes 3 1 - 7 %    Eosinophils % 0 (L) 1 - 4 %    Basophils 0 0 - 2 %    Absolute Immature Granulocyte 0.47 (H) 0.00 - 0.30 k/uL    Segs Absolute 10.27 (H) 1.8 - 7.7 k/uL    Absolute Lymph # 0.71 (L) 1.0 - 4.8 k/uL    Absolute Mono # 0.35 0.1 - 0.8 k/uL    Absolute Eos # 0.00 0.0 - 0.4 k/uL    Basophils Absolute 0.00 0.0 - 0.2 k/uL    Morphology ANISOCYTOSIS PRESENT    SPECIMEN REJECTION   Result Value Ref Range    Specimen Source . BLOOD     Ordered Test CDP, BMPX     Reason for Rejection Unable to perform testing: Specimen clotted. SPECIMEN REJECTION   Result Value Ref Range    Specimen Source . BLOOD     Ordered Test BMPX     Reason for Rejection Unable to perform testing: Specimen hemolyzed.     Basic Metabolic Panel w/ Reflex to MG   Result Value Ref Range    Glucose 241 (H) 70 - 99 mg/dL    BUN 28 (H) 6 - 20 mg/dL    CREATININE 1.09 (H) 0.50 - 0.90 mg/dL    Calcium 8.6 8.6 - 10.4 mg/dL    Sodium 139 135 - 144 mmol/L    Potassium 4.0 3.7 - 5.3 mmol/L    Chloride 95 (L) 98 - 107 mmol/L    CO2 32 (H) 20 - 31 mmol/L    Anion Gap 12 9 - 17 mmol/L    GFR Non-African American 54 (L) >60 mL/min    GFR African American >60 >60 mL/min    GFR Comment         Basic Metabolic Panel w/ Reflex to MG   Result Value Ref Range    Glucose 192 (H) 70 - 99 mg/dL    BUN 36 (H) 6 - 20 mg/dL    CREATININE 1.05 (H) 0.50 - 0.90 mg/dL    Calcium 8.6 8.6 - 10.4 mg/dL    Sodium 138 135 - 144 mmol/L    Potassium 3.9 3.7 - 5.3 mmol/L    Chloride 96 (L) 98 - 107 mmol/L    CO2 30 20 - 31 mmol/L    Anion Gap 12 9 - 17 mmol/L    GFR Non-African American 56 (L) >60 mL/min    GFR African American >60 >60 mL/min    GFR Comment         CBC with Auto Differential   Result Value Ref Range    WBC 10.7 3.5 - 11.3 k/uL    RBC 3.61 (L) 3.95 - 5.11 m/uL    Hemoglobin 9.8 (L) 11.9 - 15.1 g/dL    Hematocrit 32.8 (L) 36.3 - 47.1 %    MCV 90.9 82.6 - 102.9 fL    MCH 27.1 25.2 - 33.5 pg    MCHC 29.9 28.4 - 34.8 g/dL    RDW 14.4 11.8 - 14.4 %    Platelets 870 375 - 244 k/uL    MPV 11.0 8.1 - 13.5 fL    NRBC Automated 0.0 0.0 per 100 WBC    Seg Neutrophils 83 (H) 36 - 65 %    Lymphocytes 9 (L) 24 - 43 %    Monocytes 7 3 - 12 %    Eosinophils % 0 (L) 1 - 4 %    Basophils 0 0 - 2 %    Immature Granulocytes 1 (H) 0 %    Segs Absolute 8.85 (H) 1.50 - 8.10 k/uL    Absolute Lymph # 0.98 (L) 1.10 - 3.70 k/uL    Absolute Mono # 0.74 0.10 - 1.20 k/uL    Absolute Eos # <0.03 0.00 - 0.44 k/uL    Basophils Absolute <0.03 0.00 - 0.20 k/uL    Absolute Immature Granulocyte 0.06 0.00 - 0.30 k/uL   Basic Metabolic Panel w/ Reflex to MG   Result Value Ref Range    Glucose 187 (H) 70 - 99 mg/dL    BUN 39 (H) 6 - 20 mg/dL    CREATININE 0.98 (H) 0.50 - 0.90 mg/dL    Calcium 8.8 8.6 - 10.4 mg/dL    Sodium 139 135 - 144 mmol/L    Potassium 4.0 3.7 - 5.3 mmol/L    Chloride 95 (L) 98 - 107 mmol/L    CO2 32 (H) 20 - 31 mmol/L    Anion Gap 12 9 - 17 mmol/L    GFR Non-African American >60 >60 mL/min    GFR African American >60 >60 mL/min    GFR Comment         CBC with Auto Differential   Result Value Ref Range    WBC 11.6 (H) 3.5 - 11.3 k/uL    RBC 4.05 3.95 - 5.11 m/uL    Hemoglobin 10.7 (L) 11.9 - 15.1 g/dL    Hematocrit 36.7 36.3 - 47.1 %    MCV 90.6 82.6 - 102.9 fL    MCH 26.4 25.2 - 33.5 pg    MCHC 29.2 28.4 - 34.8 g/dL    RDW 14.5 (H) 11.8 - 14.4 %    Platelets 102 544 - 044 k/uL    MPV 11.1 8.1 - 13.5 fL    NRBC Automated 0.0 0.0 per 100 WBC    Seg Neutrophils 82 (H) 36 - 65 %    Lymphocytes 10 (L) 24 - 43 %    Monocytes 7 3 - 12 %    Eosinophils % 0 (L) 1 - 4 %    Basophils 0 0 - 2 %    Immature Granulocytes 1 (H) 0 %    Segs Absolute 9.45 (H) 1.50 - 8.10 k/uL    Absolute Lymph # 1.16 1.10 - 3.70 k/uL    Absolute Mono # 0.83 0.10 - 1.20 k/uL    Absolute Eos # <0.03 0.00 - 0.44 k/uL    Basophils Absolute <0.03 0.00 - 0.20 k/uL    Absolute Immature Granulocyte 0.10 0.00 - 0.30 k/uL    RBC Morphology ANISOCYTOSIS PRESENT    CBC with Auto Differential   Result Value Ref Range    WBC 9.2 3.5 - 11.3 k/uL    RBC 4.45 3.95 - 5.11 m/uL    Hemoglobin 11.8 (L) 11.9 - 15.1 g/dL    Hematocrit 39.1 36.3 - 47.1 %    MCV 87.9 82.6 - 102.9 fL    MCH 26.5 25.2 - 33.5 pg    MCHC 30.2 28.4 - 34.8 g/dL    RDW 14.3 11.8 - 14.4 %    Platelets See Reflexed IPF Result 138 - 453 k/uL    NRBC Automated 0.0 0.0 per 100 WBC    Seg Neutrophils 79 (H) 36 - 65 %    Lymphocytes 15 (L) 24 - 43 %    Monocytes 5 3 - 12 %    Eosinophils % 0 (L) 1 - 4 %    Basophils 0 0 - 2 %    Immature Granulocytes 1 (H) 0 %    Segs Absolute 7.30 1.50 - 8.10 k/uL    Absolute Lymph # 1.33 1.10 - 3.70 k/uL    Absolute Mono # 0.46 0.10 - 1.20 k/uL    Absolute Eos # 0.03 0.00 - 0.44 k/uL    Basophils Absolute <0.03 0.00 - 0.20 k/uL    Absolute Immature Granulocyte 0.06 0.00 - 0.30 k/uL   Immature Platelet Fraction   Result Value Ref Range    Platelet, Fluorescence Platelet clumps present, count appears decreased.  138 - 453 k/uL   Urinalysis with Reflex to Culture    Specimen: Urine   Result Value Ref Range    Color, UA Yellow Yellow    Turbidity UA Clear Clear    Glucose, Ur NEGATIVE NEGATIVE    Bilirubin Urine NEGATIVE NEGATIVE    Ketones, Urine NEGATIVE NEGATIVE    Specific Gravity, UA 1.018 1.005 - 1.030    Urine Hgb NEGATIVE NEGATIVE    pH, UA 6.0 5.0 - 8.0    Protein, UA 2+ (A) NEGATIVE    Urobilinogen, Urine Normal Normal    Nitrite, Urine NEGATIVE NEGATIVE    Leukocyte Esterase, Urine NEGATIVE NEGATIVE   Microscopic Urinalysis   Result Value Ref Range    -          WBC, UA None 0 - 5 /HPF    RBC, UA 2 TO 5 0 - 4 /HPF    Casts UA  0 - 8 /LPF     0 TO 2 HYALINE Reference range defined for non-centrifuged specimen.     Epithelial Cells UA 2 TO 5 0 - 5 /HPF   Basic Metabolic Panel w/ Reflex to MG   Result Value Ref Range    Glucose 245 (H) 70 - 99 mg/dL    BUN 40 (H) 6 - 20 mg/dL    CREATININE 1.44 (H) 0.50 - 0.90 mg/dL    Calcium 8.9 8.6 - 10.4 mg/dL    Sodium 137 135 - 144 mmol/L    Potassium 3.5 (L) 3.7 - 5.3 mmol/L    Chloride 91 (L) 98 - 107 mmol/L    CO2 27 20 - 31 mmol/L    Anion Gap 19 (H) 9 - 17 mmol/L    GFR Non-African American 39 (L) >60 mL/min    GFR  47 (L) >60 mL/min    GFR Comment         CBC with Auto Differential   Result Value Ref Range    WBC 26.1 (H) 3.5 - 11.3 k/uL    RBC 4.57 3.95 - 5.11 m/uL    Hemoglobin 12.4 11.9 - 15.1 g/dL    Hematocrit 40.0 36.3 - 47.1 %    MCV 87.5 82.6 - 102.9 fL    MCH 27.1 25.2 - 33.5 pg    MCHC 31.0 28.4 - 34.8 g/dL    RDW 14.5 (H) 11.8 - 14.4 %    Platelets 518 857 - 459 k/uL    MPV 11.3 8.1 - 13.5 fL    NRBC Automated 0.0 0.0 per 100 WBC    Immature Granulocytes 1 (H) 0 %    Seg Neutrophils 87 (H) 36 - 66 %    Lymphocytes 5 (L) 24 - 44 %    Monocytes 7 1 - 7 %    Eosinophils % 0 (L) 1 - 4 %    Basophils 0 0 - 2 %    Absolute Immature Granulocyte 0.26 0.00 - 0.30 k/uL    Segs Absolute 22.70 (H) 1.8 - 7.7 k/uL    Absolute Lymph # 1.31 1.0 - 4.8 k/uL    Absolute Mono # 1.83 (H) 0.1 - 0.8 k/uL    Absolute Eos # 0.00 0.0 - 0.4 k/uL    Basophils Absolute 0.00 0.0 - 0.2 k/uL    Morphology Normal    Methemoglobin   Result Value Ref Range    Methemoglobin 1.1 0.0 - 1.5 %   Methemoglobin   Result Value Ref Range    Methemoglobin 0.5 0.0 - 1.5 %   Magnesium   Result Value Ref Range    Magnesium 1.7 1.6 - 2.6 mg/dL   Venous Blood Gas, POC   Result Value Ref Range    pH, Zaid 7.361 7.320 - 7.430    pCO2, Zaid 60.6 (H) 41.0 - 51.0 mm Hg    pO2, Zaid 34.8 30.0 - 50.0 mm Hg    HCO3, Venous 34.3 (H) 22.0 - 29.0 mmol/L    Positive Base Excess, Zaid 7 (H) 0.0 - 3.0    O2 Sat, Zaid 62 60.0 - 85.0 %   Creatinine W/GFR Point of Care   Result Value Ref Range    POC Creatinine 0. 89 0.51 - 1.19 mg/dL    GFR Comment >60 >60 mL/min    GFR Non-African American >60 >60 mL/min    GFR Comment         POCT urea (BUN)   Result Value Ref Range    POC BUN 16 8 - 26 mg/dL   Lactic Acid, POC   Result Value Ref Range    POC Lactic Acid 1.08 0.56 - 1.39 mmol/L   POCT Glucose   Result Value Ref Range    POC Glucose 191 (H) 74 - 100 mg/dL   POC Glucose Fingerstick   Result Value Ref Range    POC Glucose 199 (H) 65 - 105 mg/dL   POC Glucose Fingerstick   Result Value Ref Range    POC Glucose 211 (H) 65 - 105 mg/dL   POC Glucose Fingerstick   Result Value Ref Range    POC Glucose 198 (H) 65 - 105 mg/dL   POC Glucose Fingerstick   Result Value Ref Range    POC Glucose 220 (H) 65 - 105 mg/dL   POC Glucose Fingerstick   Result Value Ref Range    POC Glucose 204 (H) 65 - 105 mg/dL   POC Glucose Fingerstick   Result Value Ref Range    POC Glucose 179 (H) 65 - 105 mg/dL   POC Glucose Fingerstick   Result Value Ref Range    POC Glucose 240 (H) 65 - 105 mg/dL   POC Glucose Fingerstick   Result Value Ref Range    POC Glucose 205 (H) 65 - 105 mg/dL   POC Glucose Fingerstick   Result Value Ref Range    POC Glucose 175 (H) 65 - 105 mg/dL   POC Glucose Fingerstick   Result Value Ref Range    POC Glucose 180 (H) 65 - 105 mg/dL   POC Glucose Fingerstick   Result Value Ref Range    POC Glucose 226 (H) 65 - 105 mg/dL   POC Glucose Fingerstick   Result Value Ref Range    POC Glucose 228 (H) 65 - 105 mg/dL   POC Glucose Fingerstick   Result Value Ref Range    POC Glucose 143 (H) 65 - 105 mg/dL   POC Glucose Fingerstick   Result Value Ref Range    POC Glucose 211 (H) 65 - 105 mg/dL   POC Glucose Fingerstick   Result Value Ref Range    POC Glucose 170 (H) 65 - 105 mg/dL   POC Glucose Fingerstick   Result Value Ref Range    POC Glucose 185 (H) 65 - 105 mg/dL   POC Glucose Fingerstick   Result Value Ref Range    POC Glucose 229 (H) 65 - 105 mg/dL   Arterial Blood Gas, POC   Result Value Ref Range    POC pH 7.602 (HH) 7.350 - 7.450    POC pCO2 34.8 (L) 35.0 - 48.0 mm Hg    POC PO2 58.3 (L) 83.0 - 108.0 mm Hg    POC HCO3 34.4 (H) 21.0 - 28.0 mmol/L    Positive Base Excess, Art 12 (H) 0.0 - 3.0    POC O2 SAT 94 94.0 - 98.0 %   Notification Panel, POC   Result Value Ref Range    Action NotifyRN     NOTIFY Rosi     READ BACK Yes     Date/Time 06/12/202217:29:00    POC Glucose Fingerstick   Result Value Ref Range    POC Glucose 227 (H) 65 - 105 mg/dL   Arterial Blood Gas, POC   Result Value Ref Range    POC pH 7.516 (H) 7.350 - 7.450    POC pCO2 43.5 35.0 - 48.0 mm Hg    POC PO2 59.5 (L) 83.0 - 108.0 mm Hg    POC HCO3 35.2 (H) 21.0 - 28.0 mmol/L    Positive Base Excess, Art 11 (H) 0.0 - 3.0    POC O2 SAT 93 (L) 94.0 - 98.0 %    O2 Device/Flow/% Adult Ventilator     Sample Site Arterial Line     Mode PRVC     FIO2 80.0    POCT Glucose   Result Value Ref Range    POC Glucose 232 (H) 74 - 100 mg/dL   Arterial Blood Gas, POC   Result Value Ref Range    POC pH 7.370 7.350 - 7.450    POC pCO2 52.3 (H) 35.0 - 48.0 mm Hg    POC PO2 53.5 (L) 83.0 - 108.0 mm Hg    POC HCO3 30.3 (H) 21.0 - 28.0 mmol/L    Positive Base Excess, Art 4 (H) 0.0 - 3.0    POC O2 SAT 86 (L) 94.0 - 98.0 %    O2 Device/Flow/% Adult Ventilator     Sample Site Arterial Line     Mode PRVC     FIO2 100.0     Pt Temp 38.5     POC pH Temp 7.35     POC pCO2 Temp 56 mm Hg    POC pO2 Temp 59 mm Hg   POCT Glucose   Result Value Ref Range    POC Glucose 267 (H) 74 - 100 mg/dL   Arterial Blood Gas, POC   Result Value Ref Range    POC pH 7.382 7.350 - 7.450    POC pCO2 51.3 (H) 35.0 - 48.0 mm Hg    POC PO2 46.0 (LL) 83.0 - 108.0 mm Hg    POC HCO3 30.5 (H) 21.0 - 28.0 mmol/L    Positive Base Excess, Art 4 (H) 0.0 - 3.0    POC O2 SAT 80 (L) 94.0 - 98.0 %    O2 Device/Flow/% Adult Ventilator     Sample Site Arterial Line     Mode PRVC     FIO2 100.0    POCT Glucose   Result Value Ref Range    POC Glucose 230 (H) 74 - 100 mg/dL   Arterial Blood Gas, POC   Result Value Ref Range    POC pH 7.404 7.350 - 7.450    POC pCO2 45.8 35.0 - 48.0 mm Hg    POC PO2 61.5 (L) 83.0 - 108.0 mm Hg    POC HCO3 28.6 (H) 21.0 - 28.0 mmol/L    Positive Base Excess, Art 3 0.0 - 3.0    POC O2 SAT 91 (L) 94.0 - 98.0 %    O2 Device/Flow/% Adult Ventilator     Sample Site Arterial Line     Mode PRVC     FIO2 100.0    POCT Glucose   Result Value Ref Range    POC Glucose 215 (H) 74 - 100 mg/dL   POC Glucose Fingerstick   Result Value Ref Range    POC Glucose 224 (H) 65 - 105 mg/dL   EKG 12 Lead   Result Value Ref Range    Ventricular Rate 99 BPM    Atrial Rate 99 BPM    P-R Interval 148 ms    QRS Duration 84 ms    Q-T Interval 360 ms    QTc Calculation (Bazett) 462 ms    P Axis 46 degrees    R Axis 53 degrees    T Axis -17 degrees     Radiology/Imaging:  XR CHEST PORTABLE   Final Result   1. No pneumothorax      2. Diffuse bilateral airspace opacities      3. Small left pleural effusion         XR CHEST PORTABLE   Final Result   There is increasing bilateral airspace disease. Posteriorly layering pleural   fluid may contribute to the increasing opacity. No pneumothorax. Tubes and lines remain in good position. XR ABDOMEN FOR NG/OG/NE TUBE PLACEMENT   Final Result   1. Satisfactory position of support devices. Stable multifocal   consolidation could represent edema or pneumonia. 2. Enteric tube terminates in the gastric body. No bowel obstruction. XR CHEST PORTABLE   Final Result   1. Satisfactory position of support devices. Stable multifocal   consolidation could represent edema or pneumonia. 2. Enteric tube terminates in the gastric body. No bowel obstruction. XR CHEST PORTABLE   Final Result   Successful intubation with the endotracheal tube 3.7 cm above the lore. Perihilar edema/infiltrates suggestive of ARDS. VL DUP LOWER EXTREMITY VENOUS BILATERAL   Final Result      XR CHEST PORTABLE   Final Result   Cardiomegaly mild vascular congestion similar to prior. ASSESSMENT:     Patient Active Problem List    Diagnosis Date Noted    COVID-19 virus infection 06/08/2022    Acute on chronic diastolic congestive heart failure ([address] 06/08/2022    Respiratory distress     Diarrhea 04/19/2022    Cor pulmonale, chronic ([address] 04/17/2022    CHF (congestive heart failure), NYHA class I, acute on chronic, combined ([address] 04/08/2022    Prediabetes 03/19/2022    Hyperlipidemia 03/19/2022    Hypokalemia 03/19/2022    Hypomagnesemia 03/19/2022    Dyspnea 03/18/2022    Type 2 diabetes mellitus ([address] 05/06/2019    Dizziness 05/06/2019    Normocytic anemia 05/06/2019    Acute on chronic congestive heart failure ([address]     Right heart failure, unspecified ([facility] 02/22/2019    Acute on chronic diastolic heart failure (HCC) 02/22/2019    Muscle spasm 11/19/2018    Chronic narcotic dependence ([address] 11/16/2018    Chronically on benzodiazepine therapy 11/16/2018    Neuropathy 11/16/2018    Epigastric pain 11/16/2018    Chronic respiratory failure ([address] 09/16/2018    Pulmonary hypertension, moderate to severe ([address] 06/09/2018    Morbid obesity with BMI of 50.0-59.9, adult ([address]     Obesity hypoventilation syndrome ([address]     H/O tracheostomy     STEPH (obstructive sleep apnea)     Chest pain 06/08/2018    Acute non-recurrent pansinusitis     Status post tracheostomy ([address] 02/17/2018    Status post insertion of percutaneous endoscopic gastrostomy (PEG) tube ([address] 02/17/2018    Rhinovirus infection     Goals of care, counseling/discussion     Fever     Disease due to rhinovirus     ARDS (adult respiratory distress syndrome) ([address] 02/02/2018    COPD exacerbation ([address]     NSTEMI (non-ST elevated myocardial infarction) ([address] 01/27/2018    Acute pulmonary edema ([address] 01/27/2018    Hypertensive emergency 01/27/2018    Acute respiratory failure with hypoxia and hypercapnia ([address] 01/27/2018    Smoker 01/27/2018    Morbid obesity ([facility] 01/27/2018    SOB (shortness of breath) 2018    Pneumonia of both lower lobes due to infectious organism 2018    Asthma 2014    Pneumonia 2014    HTN (hypertension) 2014    Torn meniscus 2014      PLAN:     42-year-old female with acute on chronic hypercapnic hypoxic respiratory failure requiring max BiPAP with superimposed COVID, CHF and COPD, severe pulmonary hypertension. Intubated on  for decreasing oxygen saturation. ARDS protocol, nitric oxide started. ECMO team is consulted. PLAN/MEDICAL DECISION MAKING:  Neurologic:   Patient is intubated   Sedation: propofol > switch to versed, fentanyl    Cardiovascular:  · Echo ordered  · COVID-induced respiratory failure  · CHF  · Norepinephrine decreased from 25 to 8  · Nitroglycerin as needed for chest pain  · Bumetanide 1mg bid > increased to home dose bumex 2mg bid > decreased  to 1mg bid  Pulmonary:  · Patient on ventilator  · Patient's baseline is 60-80 at home. Vent Information  Ventilator ID: [id number]  Vent Mode: PRVC  · Ventilator Initiate: Yes   · Patient low 90s on 100% FiO2Severe pulmonary hypertension  · Possible COPD exacerbation: duoneb, tessalon, mucinex ER held  · Patient on Rocephin, completed 5 days  · Worsening CXR    GI/Nutrition  · Pepcid for GI prophylaxis  · Diabetic feeds    Renal/Fluid/Electrolyte  · Patient on bumetanide, decreased dose due to HIEN  · 500ml bolus given for low UOP  · Continue to monitor urine output  · 0.5 cc/kg/h with 1 unmeasured instances  · Replete electrolytes as needed    ID  WBC:   Lab Results   Component Value Date    WBC 26.1 (H) 2022     Tmax: Temp (24hrs), Av.3 °F (37.4 °C), Min:99 °F (37.2 °C), Max:100 °F (37.8 °C)  ·   · Patient has been afebrile, hemodynamically stable, WBC 11.6. · Continue Rocephin daily for 5 days until 2022.   · ID consulted for COVID worsening, starting remdesivir    Hematology:  Recent Labs     22  0437 22  0502 22  0443   HGB 10.7* 11.8* 12.4   ·  stable  Endocrine:   glucose controlled - most recent BGL is   Recent Labs     06/11/22  0437 06/13/22  0443   GLUCOSE 187* 245*   Medium ISS changed to high ISS    DVT Prophylaxis  · Patient is on heparin for DVT prophylaxis.   Discharge Needs:  PT, OT, ST, SW and Case Management      CODE STATUS: Full Code    DISPOSITION:  [x] To remain ICU  [] OK for out of ICU from [address], [place]  TY Resident  06/13/22 1:19 PM

## 2022-06-13 NOTE — SIGNIFICANT EVENT
Patient saturating low-80s with nearly maximum vent settings. Tachycardia and hypotension also present. CXR ordered to assess for possible pneumothorax. Official read pending but no obvious large pneumothorax on my inspection of the CXR. I discussed with Dr. Kandi Munoz, who recommends paralyzing the patient and starting Nitric Oxide. I called the patient's sister to discuss Code Status. At this time they wish to proceed with Full Code. I updated them on the patient's current status and my concern for potential decline. The sister indicated understanding. At the time this note was started, the patient was initiated on Nimbex gtt as well as nitric oxide and her oxygen saturation has improved to 86% currently.     Electronically signed by Renetta Medel MD on 6/12/2022 at 11:13 PM

## 2022-06-13 NOTE — PROGRESS NOTES
Physical Therapy        Physical Therapy Cancel Note      DATE: 2022    NAME: Damián Bowman  MRN: [id number]   : 1975      Patient not seen this date for Physical Therapy due to: Other: Pt intubated/sedated will CTA.       Electronically signed by Yann Doty PTA on 2022 at 2:38 PM

## 2022-06-13 NOTE — ACP (ADVANCE CARE PLANNING)
Advance Care Planning     Advance Care Planning (ACP) Physician/NP/PA Conversation    Date of Conversation: 6/13/2022  Conducted with: Patient is on ventilator with sedation and paralytic unable to have discussion or make decisions. Spoke with patient sister Perla Rivera:    Primary Decision Maker (Active): Melvi Reynoso - Brother/Sister - [phone]  Patient is single and has 1 biological child Fran Prabhakar. She has completed Healthcare POA and Denia patient sister is POA #1. Patient lives with her son Fran Prabhakar and his family. Patient healthcare POA is in the EMR. Care Preferences:    Hospitalization: \"If your health worsens and it becomes clear that your chance of recovery is unlikely, what would be your preference regarding hospitalization? \"  Hospitalized     Ventilation: \"If you were unable to breath on your own and your chance of recovery was unlikely, what would be your preference about the use of a ventilator (breathing machine) if it was available to you? \"  Full code     Resuscitation: \"In the event your heart stopped as a result of an underlying serious health condition, would you want attempts made to restart your heart, or would you prefer a natural death? \"  Full code     Conversation Outcomes / Follow-Up Plan:  Palliative Interaction:  I called and spoke with Denia patient sister and introduced myself and my palliative care role.      Patient is single and has 1 biological child Fran Prabhakar. She has completed Healthcare POA and Denia patient sister is POA #1. Patient lives with her son Fran Prabhakar and his family. Patient healthcare POA is in the EMR.     We discussed code status and I explained each level completely to patient sister. She states that her and her family work together to make decisions for her sister even though she is a POA.  Denia states they want patient to be a full code status.     Patient is currently on ventilator with Fentanyl and Propofol for sedation and Nimbex for paralytic.      Patient is on Trilogy prior to admission for respiratory support.      Palliative care will continue to follow patient and reach out to patient and family to provide emotional support and updates as needed.      Length of Voluntary ACP Conversation in minutes:  16 minutes    TOLU Olivas - NP

## 2022-06-13 NOTE — PROGRESS NOTES
Pharmacy Note     Renal Dose Adjustment    Hamlet Patricio is a 55 y.o. female. Pharmacist assessment of renally cleared medications. Recent Labs     06/11/22 0437 06/13/22 0443   BUN 39* 40*       Recent Labs     06/11/22 0437 06/13/22 0443   CREATININE 0.98* 1.44*       Estimated Creatinine Clearance: 68 mL/min (A) (based on SCr of 1.44 mg/dL (H)).   Estimated CrCl using Ideal Body Weight: 35 mL/min (based on IBW 45.5 kg)    Height:   Ht Readings from Last 1 Encounters:   06/09/22 5' (1.524 m)     Weight:  Wt Readings from Last 1 Encounters:   06/10/22 (!) 334 lb 6.4 oz (151.7 kg)       The following medication dose has been adjusted based upon renal function per P&T Guidelines:             Gabapentin 400mg TID to BID  Famotidine 20mg BID to Daily    Rosa Maria Ro PharmD BCPS [facility]  6/13/2022 10:30 AM

## 2022-06-13 NOTE — PROGRESS NOTES
Comprehensive Nutrition Assessment    Type and Reason for Visit:  Consult (TF ordering/management)    Nutrition Recommendations/Plan:   Start Tube Feeding- Suggest Peptide Based High Protein Formula goal 55 mL/hr to provide 1320 kcal and 116 g pro/day. Will monitor TF tolerance/adequacy. Malnutrition Assessment:  Malnutrition Status: At risk for malnutrition     Context:  Acute Illness     Findings of the 6 clinical characteristics of malnutrition:  Energy Intake:  Mild decrease in energy intake   Weight Loss:  No significant weight loss     Body Fat Loss:  No significant body fat loss     Muscle Mass Loss:  No significant muscle mass loss    Fluid Accumulation:  Moderate to Severe Extremities,Generalized   Strength:  Not Performed    Nutrition Assessment:    Pt admitted with respiratory distress, +COVID. Noted pt was intubated yesterday. Plan to start tube feeding today; RD consulted for TF ordering/management. Meds/labs reviewed: Glucose 215-245 mg/dL. On Decadron and Humalog SS. Nutrition Related Findings:    meds/labs reviewed Wound Type: Pressure Injury (coccyx)       Current Nutrition Intake & Therapies:    No diet orders on file  Additional Calorie Sources:  · Propofol now off. Anthropometric Measures:  Height: 5' (152.4 cm)  Ideal Body Weight (IBW): 100 lbs (45 kg)       Current Body Weight: 334 lb 14.1 oz (151.9 kg), 334.9 % IBW.     Current BMI (kg/m2): 65.4                          BMI Categories: Obese Class 3 (BMI 40.0 or greater)    Estimated Daily Nutrient Needs:  Energy Requirements Based On: Kcal/kg  Weight Used for Energy Requirements: Ideal  Energy (kcal/day): 4360-3998 kcal/day  Weight Used for Protein Requirements: Ideal  Protein (g/day):  g pro/day  Method Used for Fluid Requirements: Other (Comment)  Fluid (ml/day): per MD    Nutrition Diagnosis:   · Inadequate oral intake related to impaired respiratory function as evidenced by NPO or clear liquid status due to medical condition, need for enteral nutrition support    Nutrition Interventions:   Food and/or Nutrient Delivery: Start Tube Feeding  Nutrition Education/Counseling: No recommendation at this time  Coordination of Nutrition Care: Continue to monitor while inpatient       Goals:     Goals: Meet at least 75% of estimated needs,within 7 days       Nutrition Monitoring and Evaluation:   Behavioral-Environmental Outcomes: None Identified  Food/Nutrient Intake Outcomes: Enteral Nutrition Intake/Tolerance  Physical Signs/Symptoms Outcomes: Biochemical Data,Nutrition Focused Physical Findings,Skin,Weight,Fluid Status or Edema    Discharge Planning:     Too soon to determine     [address], LD  Contact: [phone]

## 2022-06-13 NOTE — CARE COORDINATION
TRANSITIONAL CARE PLANNING/ 2 Rehab Maxi Day: 5    Reason for Admission: Respiratory distress [R06.03]  Acute on chronic diastolic congestive heart failure ([address] [I50.33]     Treatment Plan of Care:  Intubated/ Sedated / Paralyzed FiO2 100%, PEEP 20, ID and Palliative Care consulted. Tests/Procedures still needed: May start ECMO tomorrow    Barriers to Discharge: Clinical Condition    Readmission Risk              Risk of Unplanned Readmission:  51            Patient goals/Treatment Preferences/Transitional Plan:  Referral sent to [facility], pt is current with [address] [facility] [employer] has VPS. Monitor for SNF need - PT/OT ordered. ID and Palliative Care consulted. Referrals Made: William ARANA    Follow Up needed: Spoke to [facility] with [facility], they have referral and are following pt.

## 2022-06-13 NOTE — FLOWSHEET NOTE
SPIRITUAL CARE DEPARTMENT - [address]  PROGRESS NOTE    Shift date: 6/12/2022  Shift day: Sunday   Shift # 3    Room # 6953/3599-85   Name: Jerod Hernandez                L[employer]-day: [address] of [employer]: Unknown    Referral: Nurse Referral    Admit Date & Time: 6/8/2022  6:12 AM    Assessment:  Jerod Hernandez is a 55 y.o. female in the hospital because of \"COVID-19. \" Patient remains \"intubated\" in room. Intervention:  Writer introduced self and title as .  was present on unit when nurse indicated that patient remains critical. Patient's bedside nurse confirmed that family should be contacted and offered to visit patient this evening. Patient's primary decision maker is her sister, Jerrod Lopez ([phone]).  contacted patient's sister and informed her that she can visit with patient this evening, along with one other support person.  returned to unit when nurse informed her that patient's son had arrived.  visited with patient's son in room. Several other family members arrived to unit soon afterward. Patient's two sisters and son visited patient at bedside and  assisted them with donning and doffing.  facilitated medical update between family and resident doctor. Patient remains \"critical,\" however, patient's vitals are \"more stable,\" this evening. Outcome:  Patient's family members thanked  for support and care. Plan:  Chaplains will remain available to offer spiritual and emotional support as needed.        06/12/22 2302   Encounter Summary   Service Provided For: Family   Referral/Consult From: Nurse   Support System Children;Family members   Last Encounter  06/12/22   Complexity of Encounter High   Begin Time 2302   End Time  0150   Total Time Calculated 168 min   Encounter    Type Family Care   Crisis   Type Family Care   Spiritual/Emotional needs   Type Spiritual Support   Grief, Loss, and Adjustments   Type Adjustment to illness   Assessment/Intervention/Outcome   Assessment Calm;Fearful;Tearful   Intervention Active listening;Discussed illness injury and its impact; Explored/Affirmed feelings, thoughts, concerns;Explored Coping Skills/Resources;Sustaining Presence/Ministry of presence   Outcome Comfort;Coping;Engaged in conversation;Expressed Gratitude;Receptive   Plan and Referrals   Plan/Referrals Continue to visit, (comment)  (as needed)     Electronically signed by Shad Smith on 6/13/2022 at [address] [facility]  [phone]

## 2022-06-13 NOTE — PLAN OF CARE
Problem: Safety - Medical Restraint  Goal: Remains free of injury from restraints (Restraint for Interference with Medical Device)  Description: INTERVENTIONS:  1. Determine that other, less restrictive measures have been tried or would not be effective before applying the restraint  2. Evaluate the patient's condition at the time of restraint application  3. Inform patient/family regarding the reason for restraint  4.  Q2H: Monitor safety, psychosocial status, comfort, nutrition and hydration  6/13/2022 0741 by Albertina Santiago RN  Outcome: Completed  Flowsheets (Taken 6/13/2022 0600 by Kiana Taylor RN)  Remains free of injury from restraints (restraint for interference with medical device):   Determine that other, less restrictive measures have been tried or would not be effective before applying the restraint   Evaluate the patient's condition at the time of restraint application   Inform patient/family regarding the reason for restraint   Every 2 hours: Monitor safety, psychosocial status, comfort, nutrition and hydration  6/13/2022 0417 by Kiana Taylor RN  Outcome: Progressing  Flowsheets  Taken 6/12/2022 2000 by Kiana Taylor RN  Remains free of injury from restraints (restraint for interference with medical device):   Determine that other, less restrictive measures have been tried or would not be effective before applying the restraint   Evaluate the patient's condition at the time of restraint application   Inform patient/family regarding the reason for restraint   Every 2 hours: Monitor safety, psychosocial status, comfort, nutrition and hydration  Taken 6/12/2022 1600 by Fito Julian RN  Remains free of injury from restraints (restraint for interference with medical device): Every 2 hours: Monitor safety, psychosocial status, comfort, nutrition and hydration

## 2022-06-14 NOTE — PLAN OF CARE
Problem: Respiratory - Adult  Goal: Achieves optimal ventilation and oxygenation  6/14/2022 0949 by Joel Miller RCP  Outcome: Progressing  6/13/2022 2018 by Oliver Marte RN  Outcome: Progressing

## 2022-06-14 NOTE — PROGRESS NOTES
PHARMACY NOTE:    The electrolyte replacement protocol for potassium/magnesium has been discontinued per [employer] guidelines because the patient has reduced renal function (CrCl < 30 mL/min). The patient's most recent potassium & magnesium levels are:  Recent Labs     06/13/22  0443 06/14/22  0739   K 3.5* 4.8   MG 1.7  --      For patients with decreased renal function (below 30ml/min) needing potassium/magnesium supplementation, please order individual bolus doses with appropriate monitoring. Please contact the inpatient pharmacy with any concerns. Thank you.   Lazarus Isles, PharmD [facility]  6/14/2022 11:03 AM

## 2022-06-14 NOTE — PROGRESS NOTES
Physical Therapy        Physical Therapy Cancel Note      DATE: 2022    NAME: Jono Ramsey  MRN: [id number]   : 1975      Patient not seen this date for Physical Therapy due to:     Other: Intubated/sedated will CTA      Electronically signed by Sergei Ng PTA on 2022 at 2:52 PM

## 2022-06-14 NOTE — PROGRESS NOTES
INTENSIVE CARE UNIT  Resident Physician Progress Note    Patient - Amrik Jauregui  Date of Admission -  6/8/2022  6:12 AM  Date of Evaluation -  6/14/2022  Room and Bed Number -  1414/8389-33   Hospital Day - 6    SUBJECTIVE:     OVERNIGHT EVENTS:      Overnight, patient is febrile up to 102.4, with HR in low 110s. Satting high 80s. Due to elevated procal and CRP, will get blood cultures and start on antibiotics. Patient was declined for ECMO due to pulmonary hypertension. Patient was proned this morning. Overnight, patient was satting in the low 80s with high vent settings, tachycardia, and hypotension. Low urine output overnight. ECMO team consulted. Spoke to sister, Gilford Goods, who was updated on patient's current condition. Sister unsure whether to wait at waiting room/ bedside/ by their phones and states she lives 15 minutes away and can be at the hospital within 30min. Advised that patient's condition is critical and can rapidly change thus at least have phone handy. Updated on slight improvement in oxygenation saturation and ECMO evaluation.  FLUIDS: none   FEEDING: dietician consulted for feedings   FAMILY: contacted sister Gilford Goods, she said she was POA several years ago when patient needed one. Son is now an adult. Denia is spokesperson for the family. She lives 15 minutes away. Spoke to Gilford Goods 6/14 and updated her on patient's condition. Was unable to reach patient's son Lakshmi Real.     ANALGESICS: prn oxycodone-acetaminophen   SEDATION: Propofol  and Fentanyl   THROMBO- PROPHYLAXIS: Heparin   MOBILIZATION: on vent   HEADS UP: yes   HEMODYNAMICS: norepinephrine off   ULCER PROPHYLAXIS: Pepcid 20mg bid   GLYCEMIC CONTROL: medium sliding scale   SPONTANEOUS BREATHING TRIAL: satting high 70s overnight, no SBT   BOWEL MANAGEMENT: glycolax daily prn, dosucate 100mg daily, constipated day prior to intubation bisacodyl 10mg daily given with large BM resulting   INDWELLING CATHETER: inserted 6/12/22   DRUG DE-ESCALATION: ceftriaxone 5 day ended 6/12, started zosyn 6/13 empirical coverage    HISTORY OF PRESENT ILLNESS:    14-year-old female admitted for hypoxic respiratory failure requiring maximum BiPAP settings, 80% FiO2 secondary to COVID induced respiratory failure with acute on chronic CHF and/or COPD exacerbation with pulmonary hypertension. Patient is treating treated with ceftriaxone, diuretics, BiPAP, breathing treatments. 6/11 Patient required continuous BiPAP overnight, FiO2 100%. Patient feels constipated and has a cough. Overall, is feeling better today. Denies any pain during exam, including chest pain. Satting 88% to low 90s when switched to HFNC. *Update: patient reports a lot of discomfort/ pain secondary to constipation. Bisacodyl added. 6/12 patient was satting 80s overnight. She had previously refused intubation, saying she is in the 80s at home. Saturation been accepting of intubation. Patient was intubated and placed on propofol and fentanyl. Saturating 70/ 80s, ARDS protocol with low Tv and high RR started as well as nitric oxide. 6/13   Overnight, patient was satting in the low 80s with high vent settings, tachycardia, and hypotension. Low urine output overnight. ECMO team consulted. Some improvement to low 90s. Spoke to sister, Megan Reid, who was updated on patient's current condition. Sister unsure whether to wait at waiting room/ bedside/ by their phones and states she lives 15 minutes away and can be at the hospital within 30min. Advised that patient's condition is critical and can rapidly change thus at least have phone handy. Updated on slight improvement in oxygenation saturation and ECMO evaluation.     OBJECTIVE:     VITAL SIGNS:  Patient Vitals for the past 8 hrs:   BP Temp Temp src Pulse Resp SpO2   06/14/22 1430 97/72   (!) 107 27 98 %   06/14/22 1400 105/68   (!) 107 27 98 %   06/14/22 1330 100/66   (!) 108 27 98 %   06/14/22 1300 95/69   (!) 109 27 98 %   22 1230 97/62   (!) 111 27 98 %   22 1200 89/60 (!) 102.9 °F (39.4 °C) Oral (!) 111 27 96 %   22 1131    (!) 112 22 (!) 89 %   22 1130    (!) 113 27 (!) 89 %   22 1100 90/71   (!) 113 27 (!) 85 %   22 1030 85/65   (!) 112 27 (!) 87 %   22 1000    (!) 111 27 (!) 87 %   22 0930    (!) 110 27 (!) 86 %   22 0900 (!) 95/58   (!) 111 27 (!) 83 %   22 0830 99/65   (!) 108 27 (!) 88 %   22 0800 93/61 (!) 100.8 °F (38.2 °C) Oral (!) 108 27 (!) 88 %   22 0700 95/62   (!) 111 27 (!) 81 %       Last Body weight:   Wt Readings from Last 3 Encounters:   22 (!) 332 lb 1.6 oz (150.6 kg)   22 (!) 334 lb 3.5 oz (151.6 kg)   22 (!) 355 lb (161 kg)       Body Mass Index : Body mass index is 64.86 kg/m². Tmax over 24 hours: Temp (24hrs), Av.9 °F (38.8 °C), Min:100.4 °F (38 °C), Max:102.9 °F (39.4 °C)      Ins/Outs:    In: 3722.4 [I.V.:1525.2; NG/GT:542]  Out: 961 [Urine:961]    PHYSICAL EXAM:  Constitutional: BMI 65  EENT: ventilator, trach scar present  Neck: short neck  Respiratory: auscultation has rhonchi and scattered wheezes  Cardiovascular:  Regular rate   Abdomen: Soft, nontender  Extremities: Bilateral lower extremity edema, minimal pitting    MEDICATIONS:  Scheduled Meds:   linezolid  600 mg IntraVENous Q12H    meropenem  1,000 mg IntraVENous Q12H    insulin lispro  0-18 Units SubCUTAneous Q4H    midodrine  5 mg Oral TID WC    bumetanide  1 mg IntraVENous BID    famotidine  20 mg Oral Daily    gabapentin  400 mg Oral BID    remdesivir IVPB  100 mg IntraVENous Q24H    [Held by provider] docusate sodium  100 mg Oral Daily    cetirizine  10 mg Oral Daily    fluticasone  1 spray Nasal Daily    sodium chloride flush  5-40 mL IntraVENous 2 times per day    ipratropium-albuterol  1 ampule Inhalation 4x daily    heparin (porcine)  5,000 Units SubCUTAneous 3 times per day   [facility] by provider] guaiFENesin  600 mg Oral BID       Continuous Infusions:   lactated ringers 100 mL/hr at 06/14/22 1400    midazolam 7 mg/hr (06/14/22 1419)    fentaNYL 50 mcg/mL 100 mcg/hr (06/14/22 1400)    propofol Stopped (06/13/22 1223)    norepinephrine Stopped (06/13/22 1621)    cisatracurium (NIMBEX) infusion 2 mcg/kg/min (06/14/22 1418)    sodium chloride 5 mL/hr at 06/14/22 1400    dextrose         PRN Meds:   albuterol, 2.5 mg, Q4H PRN  artificial tears, , PRN  sodium chloride, 30 mL, PRN  bisacodyl, 10 mg, Daily PRN  oxyCODONE-acetaminophen, 1 tablet, Q4H PRN   And  oxyCODONE, 5 mg, Q4H PRN  sodium chloride flush, 5-40 mL, PRN  sodium chloride, , PRN  ondansetron, 4 mg, Q8H PRN   Or  ondansetron, 4 mg, Q6H PRN  polyethylene glycol, 17 g, Daily PRN  acetaminophen, 650 mg, Q6H PRN   Or  acetaminophen, 650 mg, Q6H PRN  glucose, 4 tablet, PRN  dextrose bolus, 125 mL, PRN   Or  dextrose bolus, 250 mL, PRN  glucagon (rDNA), 1 mg, PRN  dextrose, 100 mL/hr, PRN        SUPPORT DEVICES: [] Ventilator [x] BIPAP  [] Nasal Cannula [] Room Air    Vent Information  Ventilator ID: [id number]  Vent Mode: [facility]  Ventilator Initiate: Yes  Additional Respiratory Assessments  Heart Rate: (!) 107  Resp: 27  SpO2: 98 %  End Tidal CO2: 41 (%)  Position: Prone  Humidification Source: Heated wire  Humidification Temp: 37  Circuit Condensation: Drained  Cuff Pressure (cm H2O):  (mlt)  Lab Results   Component Value Date    MODE [facility] 06/13/2022     ABGs:   Arterial Blood Gas result:  pH 7.4; pCO2 45 pO2 61; HCO3 28; %O2 Sat 95%.     DATA:  Complete Blood Count:   Recent Labs     06/12/22  0502 06/13/22  0443 06/14/22  0739   WBC 9.2 26.1* 25.2*   RBC 4.45 4.57 4.64   HGB 11.8* 12.4 12.4   HCT 39.1 40.0 40.7   MCV 87.9 87.5 87.7   MCH 26.5 27.1 26.7   MCHC 30.2 31.0 30.5   RDW 14.3 14.5* 14.5*   PLT See Reflexed IPF Result 352 291   MPV  --  11.3 12.2        Last 3 Blood Glucose:   Recent Labs     06/13/22  0443 06/14/22  0739 Specimen Description . NASOPHARYNGEAL SWAB     Adenovirus PCR Not Detected Not Detected    Coronavirus 229E PCR Not Detected Not Detected    Coronavirus HKU1 PCR Not Detected Not Detected    Coronavirus NL63 PCR Not Detected Not Detected    Coronavirus OC43 PCR Not Detected Not Detected    SARS-CoV-2, PCR DETECTED (A) Not Detected    Human Metapneumovirus PCR Not Detected Not Detected    Rhino/Enterovirus PCR Not Detected Not Detected    Influenza A by PCR Not Detected Not Detected    Influenza B by PCR Not Detected Not Detected    Parainfluenza 1 PCR Not Detected Not Detected    Parainfluenza 2 PCR Not Detected Not Detected    Parainfluenza 3 PCR Not Detected Not Detected    Parainfluenza 4 PCR Not Detected Not Detected    Resp Syncytial Virus PCR Not Detected Not Detected    Bordetella Parapertussis Not Detected Not Detected    B Pertussis by PCR Not Detected Not Detected    Chlamydia pneumoniae By PCR Not Detected Not Detected    Mycoplasma pneumo by PCR Not Detected Not Detected   Culture, Respiratory    Specimen: Endotracheal   Result Value Ref Range    Specimen Description . ENDOTRACHEAL     Direct Exam < 10 EPITHELIAL CELLS/LPF     Direct Exam >25 NEUTROPHILS/LPF     Direct Exam RARE GRAM POSITIVE COCCI IN CLUSTERS (A)     Culture (A)      METHICILLIN RESISTANT STAPHYLOCOCCUS AUREUS LIGHT GROWTH   MRSA DNA Probe, Nasal    Specimen: Nasal   Result Value Ref Range    Specimen Description . NASAL SWAB     MRSA, DNA, Nasal (A) NEGATIVE     POSITIVE:  MRSA DNA detected by nucleic acid amplification. Culture, Blood 1    Specimen: Blood   Result Value Ref Range    Specimen Description . BLOOD     Special Requests L HAND 5ML     Culture NO GROWTH <24 HRS    Culture, Blood 1    Specimen: Blood   Result Value Ref Range    Specimen Description . BLOOD     Special Requests L HAND 5ML     Culture NO GROWTH <24 HRS    CBC with Auto Differential   Result Value Ref Range    WBC 10.1 3.5 - 11.3 k/uL    RBC 3.90 (L) 3.95 - 5.11 m/uL    Hemoglobin 10.5 (L) 11.9 - 15.1 g/dL    Hematocrit 35.7 (L) 36.3 - 47.1 %    MCV 91.5 82.6 - 102.9 fL    MCH 26.9 25.2 - 33.5 pg    MCHC 29.4 28.4 - 34.8 g/dL    RDW 14.7 (H) 11.8 - 14.4 %    Platelets 715 808 - 266 k/uL    MPV 10.8 8.1 - 13.5 fL    NRBC Automated 0.0 0.0 per 100 WBC    Seg Neutrophils 85 (H) 36 - 65 %    Lymphocytes 8 (L) 24 - 43 %    Monocytes 6 3 - 12 %    Eosinophils % 0 (L) 1 - 4 %    Basophils 0 0 - 2 %    Immature Granulocytes 1 (H) 0 %    Segs Absolute 8.56 (H) 1.50 - 8.10 k/uL    Absolute Lymph # 0.79 (L) 1.10 - 3.70 k/uL    Absolute Mono # 0.63 0.10 - 1.20 k/uL    Absolute Eos # <0.03 0.00 - 0.44 k/uL    Basophils Absolute <0.03 0.00 - 0.20 k/uL    Absolute Immature Granulocyte 0.09 0.00 - 0.30 k/uL    RBC Morphology ANISOCYTOSIS PRESENT    Basic Metabolic Panel w/ Reflex to MG   Result Value Ref Range    Glucose 180 (H) 70 - 99 mg/dL    BUN 16 6 - 20 mg/dL    CREATININE 0.87 0.50 - 0.90 mg/dL    Calcium 9.2 8.6 - 10.4 mg/dL    Sodium 139 135 - 144 mmol/L    Potassium 3.8 3.7 - 5.3 mmol/L    Chloride 94 (L) 98 - 107 mmol/L    CO2 30 20 - 31 mmol/L    Anion Gap 15 9 - 17 mmol/L    GFR Non-African American >60 >60 mL/min    GFR African American >60 >60 mL/min    GFR Comment         Troponin   Result Value Ref Range    Troponin, High Sensitivity 12 0 - 14 ng/L   Brain Natriuretic Peptide   Result Value Ref Range    Pro-BNP 4,082 (H) <300 pg/mL   Troponin   Result Value Ref Range    Troponin, High Sensitivity 13 0 - 14 ng/L   ELECTROLYTES PLUS   Result Value Ref Range    POC Sodium 141 138 - 146 mmol/L    POC Potassium 3.6 3.5 - 4.5 mmol/L    POC Chloride 100 98 - 107 mmol/L    POC TCO2 35 (H) 22 - 30 mmol/L    Anion Gap 7 7 - 16 mmol/L   Hemoglobin and hematocrit, blood   Result Value Ref Range    POC Hemoglobin 14.0 12.0 - 16.0 g/dL    POC Hematocrit 41 36 - 46 %   CALCIUM, IONIC (POC)   Result Value Ref Range    POC Ionized Calcium 1.12 (L) 1.15 - 1.33 mmol/L   Hemoglobin A1C Result Value Ref Range    Hemoglobin A1C 6.8 (H) 4.0 - 6.0 %    Estimated Avg Glucose 148 mg/dL   Procalcitonin   Result Value Ref Range    Procalcitonin 0.19 (H) <0.09 ng/mL   BLOOD GAS, VENOUS   Result Value Ref Range    pH, Zaid 7.316 (L) 7.320 - 7.420    pCO2, Zaid 70.2 (H) 39 - 55    pO2, Zaid 34.8 30 - 50    HCO3, Venous 34.8 (H) 24 - 30 mmol/L    Positive Base Excess, Zaid 7.3 (H) 0.0 - 2.0 mmol/L    O2 Sat, Zaid 59.1 (L) 60.0 - 85.0 %    Carboxyhemoglobin 1.4 0 - 5 %    Pt Temp 37.0     FIO2 UNKNOWN    CBC with Auto Differential   Result Value Ref Range    WBC 11.8 (H) 3.5 - 11.3 k/uL    RBC 3.97 3.95 - 5.11 m/uL    Hemoglobin 10.7 (L) 11.9 - 15.1 g/dL    Hematocrit 36.5 36.3 - 47.1 %    MCV 91.9 82.6 - 102.9 fL    MCH 27.0 25.2 - 33.5 pg    MCHC 29.3 28.4 - 34.8 g/dL    RDW 14.6 (H) 11.8 - 14.4 %    Platelets 469 690 - 558 k/uL    MPV 11.3 8.1 - 13.5 fL    NRBC Automated 0.0 0.0 per 100 WBC    Immature Granulocytes 4 (H) 0 %    Seg Neutrophils 87 (H) 36 - 66 %    Lymphocytes 6 (L) 24 - 44 %    Monocytes 3 1 - 7 %    Eosinophils % 0 (L) 1 - 4 %    Basophils 0 0 - 2 %    Absolute Immature Granulocyte 0.47 (H) 0.00 - 0.30 k/uL    Segs Absolute 10.27 (H) 1.8 - 7.7 k/uL    Absolute Lymph # 0.71 (L) 1.0 - 4.8 k/uL    Absolute Mono # 0.35 0.1 - 0.8 k/uL    Absolute Eos # 0.00 0.0 - 0.4 k/uL    Basophils Absolute 0.00 0.0 - 0.2 k/uL    Morphology ANISOCYTOSIS PRESENT    SPECIMEN REJECTION   Result Value Ref Range    Specimen Source . BLOOD     Ordered Test CDP, BMPX     Reason for Rejection Unable to perform testing: Specimen clotted. SPECIMEN REJECTION   Result Value Ref Range    Specimen Source . BLOOD     Ordered Test BMPX     Reason for Rejection Unable to perform testing: Specimen hemolyzed.     Basic Metabolic Panel w/ Reflex to MG   Result Value Ref Range    Glucose 241 (H) 70 - 99 mg/dL    BUN 28 (H) 6 - 20 mg/dL    CREATININE 1.09 (H) 0.50 - 0.90 mg/dL    Calcium 8.6 8.6 - 10.4 mg/dL    Sodium 139 135 - 144 mmol/L    Potassium 4.0 3.7 - 5.3 mmol/L    Chloride 95 (L) 98 - 107 mmol/L    CO2 32 (H) 20 - 31 mmol/L    Anion Gap 12 9 - 17 mmol/L    GFR Non-African American 54 (L) >60 mL/min    GFR African American >60 >60 mL/min    GFR Comment         Basic Metabolic Panel w/ Reflex to MG   Result Value Ref Range    Glucose 192 (H) 70 - 99 mg/dL    BUN 36 (H) 6 - 20 mg/dL    CREATININE 1.05 (H) 0.50 - 0.90 mg/dL    Calcium 8.6 8.6 - 10.4 mg/dL    Sodium 138 135 - 144 mmol/L    Potassium 3.9 3.7 - 5.3 mmol/L    Chloride 96 (L) 98 - 107 mmol/L    CO2 30 20 - 31 mmol/L    Anion Gap 12 9 - 17 mmol/L    GFR Non-African American 56 (L) >60 mL/min    GFR African American >60 >60 mL/min    GFR Comment         CBC with Auto Differential   Result Value Ref Range    WBC 10.7 3.5 - 11.3 k/uL    RBC 3.61 (L) 3.95 - 5.11 m/uL    Hemoglobin 9.8 (L) 11.9 - 15.1 g/dL    Hematocrit 32.8 (L) 36.3 - 47.1 %    MCV 90.9 82.6 - 102.9 fL    MCH 27.1 25.2 - 33.5 pg    MCHC 29.9 28.4 - 34.8 g/dL    RDW 14.4 11.8 - 14.4 %    Platelets 606 553 - 293 k/uL    MPV 11.0 8.1 - 13.5 fL    NRBC Automated 0.0 0.0 per 100 WBC    Seg Neutrophils 83 (H) 36 - 65 %    Lymphocytes 9 (L) 24 - 43 %    Monocytes 7 3 - 12 %    Eosinophils % 0 (L) 1 - 4 %    Basophils 0 0 - 2 %    Immature Granulocytes 1 (H) 0 %    Segs Absolute 8.85 (H) 1.50 - 8.10 k/uL    Absolute Lymph # 0.98 (L) 1.10 - 3.70 k/uL    Absolute Mono # 0.74 0.10 - 1.20 k/uL    Absolute Eos # <0.03 0.00 - 0.44 k/uL    Basophils Absolute <0.03 0.00 - 0.20 k/uL    Absolute Immature Granulocyte 0.06 0.00 - 0.30 k/uL   Basic Metabolic Panel w/ Reflex to MG   Result Value Ref Range    Glucose 187 (H) 70 - 99 mg/dL    BUN 39 (H) 6 - 20 mg/dL    CREATININE 0.98 (H) 0.50 - 0.90 mg/dL    Calcium 8.8 8.6 - 10.4 mg/dL    Sodium 139 135 - 144 mmol/L    Potassium 4.0 3.7 - 5.3 mmol/L    Chloride 95 (L) 98 - 107 mmol/L    CO2 32 (H) 20 - 31 mmol/L    Anion Gap 12 9 - 17 mmol/L    GFR Non-African American >60 >60 mL/min GFR African American >60 >60 mL/min    GFR Comment         CBC with Auto Differential   Result Value Ref Range    WBC 11.6 (H) 3.5 - 11.3 k/uL    RBC 4.05 3.95 - 5.11 m/uL    Hemoglobin 10.7 (L) 11.9 - 15.1 g/dL    Hematocrit 36.7 36.3 - 47.1 %    MCV 90.6 82.6 - 102.9 fL    MCH 26.4 25.2 - 33.5 pg    MCHC 29.2 28.4 - 34.8 g/dL    RDW 14.5 (H) 11.8 - 14.4 %    Platelets 529 980 - 284 k/uL    MPV 11.1 8.1 - 13.5 fL    NRBC Automated 0.0 0.0 per 100 WBC    Seg Neutrophils 82 (H) 36 - 65 %    Lymphocytes 10 (L) 24 - 43 %    Monocytes 7 3 - 12 %    Eosinophils % 0 (L) 1 - 4 %    Basophils 0 0 - 2 %    Immature Granulocytes 1 (H) 0 %    Segs Absolute 9.45 (H) 1.50 - 8.10 k/uL    Absolute Lymph # 1.16 1.10 - 3.70 k/uL    Absolute Mono # 0.83 0.10 - 1.20 k/uL    Absolute Eos # <0.03 0.00 - 0.44 k/uL    Basophils Absolute <0.03 0.00 - 0.20 k/uL    Absolute Immature Granulocyte 0.10 0.00 - 0.30 k/uL    RBC Morphology ANISOCYTOSIS PRESENT    CBC with Auto Differential   Result Value Ref Range    WBC 9.2 3.5 - 11.3 k/uL    RBC 4.45 3.95 - 5.11 m/uL    Hemoglobin 11.8 (L) 11.9 - 15.1 g/dL    Hematocrit 39.1 36.3 - 47.1 %    MCV 87.9 82.6 - 102.9 fL    MCH 26.5 25.2 - 33.5 pg    MCHC 30.2 28.4 - 34.8 g/dL    RDW 14.3 11.8 - 14.4 %    Platelets See Reflexed IPF Result 138 - 453 k/uL    NRBC Automated 0.0 0.0 per 100 WBC    Seg Neutrophils 79 (H) 36 - 65 %    Lymphocytes 15 (L) 24 - 43 %    Monocytes 5 3 - 12 %    Eosinophils % 0 (L) 1 - 4 %    Basophils 0 0 - 2 %    Immature Granulocytes 1 (H) 0 %    Segs Absolute 7.30 1.50 - 8.10 k/uL    Absolute Lymph # 1.33 1.10 - 3.70 k/uL    Absolute Mono # 0.46 0.10 - 1.20 k/uL    Absolute Eos # 0.03 0.00 - 0.44 k/uL    Basophils Absolute <0.03 0.00 - 0.20 k/uL    Absolute Immature Granulocyte 0.06 0.00 - 0.30 k/uL   Immature Platelet Fraction   Result Value Ref Range    Platelet, Fluorescence Platelet clumps present, count appears decreased.  138 - 453 k/uL   Urinalysis with Reflex to Culture    Specimen: Urine   Result Value Ref Range    Color, UA Yellow Yellow    Turbidity UA Clear Clear    Glucose, Ur NEGATIVE NEGATIVE    Bilirubin Urine NEGATIVE NEGATIVE    Ketones, Urine NEGATIVE NEGATIVE    Specific Gravity, UA 1.018 1.005 - 1.030    Urine Hgb NEGATIVE NEGATIVE    pH, UA 6.0 5.0 - 8.0    Protein, UA 2+ (A) NEGATIVE    Urobilinogen, Urine Normal Normal    Nitrite, Urine NEGATIVE NEGATIVE    Leukocyte Esterase, Urine NEGATIVE NEGATIVE   Microscopic Urinalysis   Result Value Ref Range    -          WBC, UA None 0 - 5 /HPF    RBC, UA 2 TO 5 0 - 4 /HPF    Casts UA  0 - 8 /LPF     0 TO 2 HYALINE Reference range defined for non-centrifuged specimen.     Epithelial Cells UA 2 TO 5 0 - 5 /HPF   Basic Metabolic Panel w/ Reflex to MG   Result Value Ref Range    Glucose 245 (H) 70 - 99 mg/dL    BUN 40 (H) 6 - 20 mg/dL    CREATININE 1.44 (H) 0.50 - 0.90 mg/dL    Calcium 8.9 8.6 - 10.4 mg/dL    Sodium 137 135 - 144 mmol/L    Potassium 3.5 (L) 3.7 - 5.3 mmol/L    Chloride 91 (L) 98 - 107 mmol/L    CO2 27 20 - 31 mmol/L    Anion Gap 19 (H) 9 - 17 mmol/L    GFR Non-African American 39 (L) >60 mL/min    GFR  47 (L) >60 mL/min    GFR Comment         CBC with Auto Differential   Result Value Ref Range    WBC 26.1 (H) 3.5 - 11.3 k/uL    RBC 4.57 3.95 - 5.11 m/uL    Hemoglobin 12.4 11.9 - 15.1 g/dL    Hematocrit 40.0 36.3 - 47.1 %    MCV 87.5 82.6 - 102.9 fL    MCH 27.1 25.2 - 33.5 pg    MCHC 31.0 28.4 - 34.8 g/dL    RDW 14.5 (H) 11.8 - 14.4 %    Platelets 861 832 - 771 k/uL    MPV 11.3 8.1 - 13.5 fL    NRBC Automated 0.0 0.0 per 100 WBC    Immature Granulocytes 1 (H) 0 %    Seg Neutrophils 87 (H) 36 - 66 %    Lymphocytes 5 (L) 24 - 44 %    Monocytes 7 1 - 7 %    Eosinophils % 0 (L) 1 - 4 %    Basophils 0 0 - 2 %    Absolute Immature Granulocyte 0.26 0.00 - 0.30 k/uL    Segs Absolute 22.70 (H) 1.8 - 7.7 k/uL    Absolute Lymph # 1.31 1.0 - 4.8 k/uL    Absolute Mono # 1.83 (H) 0.1 - 0.8 k/uL Absolute Eos # 0.00 0.0 - 0.4 k/uL    Basophils Absolute 0.00 0.0 - 0.2 k/uL    Morphology Normal    Methemoglobin   Result Value Ref Range    Methemoglobin 1.1 0.0 - 1.5 %   Methemoglobin   Result Value Ref Range    Methemoglobin 0.5 0.0 - 1.5 %   Magnesium   Result Value Ref Range    Magnesium 1.7 1.6 - 2.6 mg/dL   C-Reactive Protein   Result Value Ref Range    .9 (H) 0.0 - 5.0 mg/L   Procalcitonin   Result Value Ref Range    Procalcitonin 14.67 (H) <0.09 ng/mL   Basic Metabolic Panel w/ Reflex to MG   Result Value Ref Range    Glucose 199 (H) 70 - 99 mg/dL    BUN 63 (H) 6 - 20 mg/dL    CREATININE 2.35 (H) 0.50 - 0.90 mg/dL    Calcium 8.7 8.6 - 10.4 mg/dL    Sodium 140 135 - 144 mmol/L    Potassium 4.8 3.7 - 5.3 mmol/L    Chloride 94 (L) 98 - 107 mmol/L    CO2 21 20 - 31 mmol/L    Anion Gap 25 (H) 9 - 17 mmol/L    GFR Non-African American 22 (L) >60 mL/min    GFR  27 (L) >60 mL/min    GFR Comment         CBC with Auto Differential   Result Value Ref Range    WBC 25.2 (H) 3.5 - 11.3 k/uL    RBC 4.64 3.95 - 5.11 m/uL    Hemoglobin 12.4 11.9 - 15.1 g/dL    Hematocrit 40.7 36.3 - 47.1 %    MCV 87.7 82.6 - 102.9 fL    MCH 26.7 25.2 - 33.5 pg    MCHC 30.5 28.4 - 34.8 g/dL    RDW 14.5 (H) 11.8 - 14.4 %    Platelets 589 211 - 661 k/uL    MPV 12.2 8.1 - 13.5 fL    NRBC Automated 0.4 (H) 0.0 per 100 WBC    Immature Granulocytes 2 (H) 0 %    Seg Neutrophils 86 (H) 36 - 65 %    Lymphocytes 5 (L) 24 - 43 %    Monocytes 7 3 - 12 %    Eosinophils % 0 (L) 1 - 4 %    Basophils 0 0 - 2 %    Absolute Immature Granulocyte 0.50 (H) 0.00 - 0.30 k/uL    Segs Absolute 21.68 (H) 1.50 - 8.10 k/uL    Absolute Lymph # 1.26 1.10 - 3.70 k/uL    Absolute Mono # 1.76 (H) 0.10 - 1.20 k/uL    Absolute Eos # 0.00 0.00 - 0.44 k/uL    Basophils Absolute 0.00 0.00 - 0.20 k/uL    Morphology ANISOCYTOSIS PRESENT    Methemoglobin   Result Value Ref Range    Methemoglobin 0.6 0.0 - 1.5 %   Triglyceride   Result Value Ref Range Triglycerides 128 <150 mg/dL   Lactic Acid   Result Value Ref Range    Lactic Acid, Whole Blood 4.7 (H) 0.7 - 2.1 mmol/L   Ferritin   Result Value Ref Range    Ferritin 1,719 (H) 13 - 150 ng/mL   Fibrinogen   Result Value Ref Range    Fibrinogen 608 (H) 140 - 420 mg/dL   Ferritin   Result Value Ref Range    Ferritin 1,689 (H) 13 - 150 ng/mL   Hepatic Function Panel   Result Value Ref Range    Albumin 2.9 (L) 3.5 - 5.2 g/dL    Alkaline Phosphatase 142 (H) 35 - 104 U/L     (H) 5 - 33 U/L     (H) <32 U/L    Total Bilirubin 0.89 0.3 - 1.2 mg/dL    Bilirubin, Direct 0.54 (H) <0.31 mg/dL    Bilirubin, Indirect 0.35 0.00 - 1.00 mg/dL    Total Protein 7.1 6.4 - 8.3 g/dL    Albumin/Globulin Ratio 0.7 (L) 1.0 - 2.5   Venous Blood Gas, POC   Result Value Ref Range    pH, Zaid 7.361 7.320 - 7.430    pCO2, Zaid 60.6 (H) 41.0 - 51.0 mm Hg    pO2, Zaid 34.8 30.0 - 50.0 mm Hg    HCO3, Venous 34.3 (H) 22.0 - 29.0 mmol/L    Positive Base Excess, Zaid 7 (H) 0.0 - 3.0    O2 Sat, Zaid 62 60.0 - 85.0 %   Creatinine W/GFR Point of Care   Result Value Ref Range    POC Creatinine 0.89 0.51 - 1.19 mg/dL    GFR Comment >60 >60 mL/min    GFR Non-African American >60 >60 mL/min    GFR Comment         POCT urea (BUN)   Result Value Ref Range    POC BUN 16 8 - 26 mg/dL   Lactic Acid, POC   Result Value Ref Range    POC Lactic Acid 1.08 0.56 - 1.39 mmol/L   POCT Glucose   Result Value Ref Range    POC Glucose 191 (H) 74 - 100 mg/dL   POC Glucose Fingerstick   Result Value Ref Range    POC Glucose 199 (H) 65 - 105 mg/dL   POC Glucose Fingerstick   Result Value Ref Range    POC Glucose 211 (H) 65 - 105 mg/dL   POC Glucose Fingerstick   Result Value Ref Range    POC Glucose 198 (H) 65 - 105 mg/dL   POC Glucose Fingerstick   Result Value Ref Range    POC Glucose 220 (H) 65 - 105 mg/dL   POC Glucose Fingerstick   Result Value Ref Range    POC Glucose 204 (H) 65 - 105 mg/dL   POC Glucose Fingerstick   Result Value Ref Range    POC Glucose 179 (H) 65 - 105 mg/dL   POC Glucose Fingerstick   Result Value Ref Range    POC Glucose 240 (H) 65 - 105 mg/dL   POC Glucose Fingerstick   Result Value Ref Range    POC Glucose 205 (H) 65 - 105 mg/dL   POC Glucose Fingerstick   Result Value Ref Range    POC Glucose 175 (H) 65 - 105 mg/dL   POC Glucose Fingerstick   Result Value Ref Range    POC Glucose 180 (H) 65 - 105 mg/dL   POC Glucose Fingerstick   Result Value Ref Range    POC Glucose 226 (H) 65 - 105 mg/dL   POC Glucose Fingerstick   Result Value Ref Range    POC Glucose 228 (H) 65 - 105 mg/dL   POC Glucose Fingerstick   Result Value Ref Range    POC Glucose 143 (H) 65 - 105 mg/dL   POC Glucose Fingerstick   Result Value Ref Range    POC Glucose 211 (H) 65 - 105 mg/dL   POC Glucose Fingerstick   Result Value Ref Range    POC Glucose 170 (H) 65 - 105 mg/dL   POC Glucose Fingerstick   Result Value Ref Range    POC Glucose 185 (H) 65 - 105 mg/dL   POC Glucose Fingerstick   Result Value Ref Range    POC Glucose 229 (H) 65 - 105 mg/dL   Arterial Blood Gas, POC   Result Value Ref Range    POC pH 7.602 (HH) 7.350 - 7.450    POC pCO2 34.8 (L) 35.0 - 48.0 mm Hg    POC PO2 58.3 (L) 83.0 - 108.0 mm Hg    POC HCO3 34.4 (H) 21.0 - 28.0 mmol/L    Positive Base Excess, Art 12 (H) 0.0 - 3.0    POC O2 SAT 94 94.0 - 98.0 %   Notification Panel, POC   Result Value Ref Range    Action NotifyRN     NOTIFY [facility]     READ BACK Yes     Date/Time 06/12/202217:29:00    POC Glucose Fingerstick   Result Value Ref Range    POC Glucose 227 (H) 65 - 105 mg/dL   Arterial Blood Gas, POC   Result Value Ref Range    POC pH 7.516 (H) 7.350 - 7.450    POC pCO2 43.5 35.0 - 48.0 mm Hg    POC PO2 59.5 (L) 83.0 - 108.0 mm Hg    POC HCO3 35.2 (H) 21.0 - 28.0 mmol/L    Positive Base Excess, Art 11 (H) 0.0 - 3.0    POC O2 SAT 93 (L) 94.0 - 98.0 %    O2 Device/Flow/% Adult Ventilator     Sample Site Arterial Line     Mode PRVC     FIO2 80.0    POCT Glucose   Result Value Ref Range    POC Glucose 232 (H) 74 - 100 mg/dL   Arterial Blood Gas, POC   Result Value Ref Range    POC pH 7.370 7.350 - 7.450    POC pCO2 52.3 (H) 35.0 - 48.0 mm Hg    POC PO2 53.5 (L) 83.0 - 108.0 mm Hg    POC HCO3 30.3 (H) 21.0 - 28.0 mmol/L    Positive Base Excess, Art 4 (H) 0.0 - 3.0    POC O2 SAT 86 (L) 94.0 - 98.0 %    O2 Device/Flow/% Adult Ventilator     Sample Site Arterial Line     Mode PRVC     FIO2 100.0     Pt Temp 38.5     POC pH Temp 7.35     POC pCO2 Temp 56 mm Hg    POC pO2 Temp 59 mm Hg   POCT Glucose   Result Value Ref Range    POC Glucose 267 (H) 74 - 100 mg/dL   Arterial Blood Gas, POC   Result Value Ref Range    POC pH 7.382 7.350 - 7.450    POC pCO2 51.3 (H) 35.0 - 48.0 mm Hg    POC PO2 46.0 (LL) 83.0 - 108.0 mm Hg    POC HCO3 30.5 (H) 21.0 - 28.0 mmol/L    Positive Base Excess, Art 4 (H) 0.0 - 3.0    POC O2 SAT 80 (L) 94.0 - 98.0 %    O2 Device/Flow/% Adult Ventilator     Sample Site Arterial Line     Mode PRVC     FIO2 100.0    POCT Glucose   Result Value Ref Range    POC Glucose 230 (H) 74 - 100 mg/dL   Arterial Blood Gas, POC   Result Value Ref Range    POC pH 7.404 7.350 - 7.450    POC pCO2 45.8 35.0 - 48.0 mm Hg    POC PO2 61.5 (L) 83.0 - 108.0 mm Hg    POC HCO3 28.6 (H) 21.0 - 28.0 mmol/L    Positive Base Excess, Art 3 0.0 - 3.0    POC O2 SAT 91 (L) 94.0 - 98.0 %    O2 Device/Flow/% Adult Ventilator     Sample Site Arterial Line     Mode PRVC     FIO2 100.0    POCT Glucose   Result Value Ref Range    POC Glucose 215 (H) 74 - 100 mg/dL   POC Glucose Fingerstick   Result Value Ref Range    POC Glucose 224 (H) 65 - 105 mg/dL   POC Glucose Fingerstick   Result Value Ref Range    POC Glucose 252 (H) 65 - 105 mg/dL   POC Glucose Fingerstick   Result Value Ref Range    POC Glucose 260 (H) 65 - 105 mg/dL   POC Glucose Fingerstick   Result Value Ref Range    POC Glucose 253 (H) 65 - 105 mg/dL   POC Glucose Fingerstick   Result Value Ref Range    POC Glucose 207 (H) 65 - 105 mg/dL   POC Glucose Fingerstick   Result Value Ref Range    POC Glucose 185 (H) 65 - 105 mg/dL   POC Glucose Fingerstick   Result Value Ref Range    POC Glucose 177 (H) 65 - 105 mg/dL   Arterial Blood Gas, POC   Result Value Ref Range    POC pH 7.394 7.350 - 7.450    POC pCO2 45.6 35.0 - 48.0 mm Hg    POC PO2 79.2 (L) 83.0 - 108.0 mm Hg    POC HCO3 27.8 21.0 - 28.0 mmol/L    Positive Base Excess, Art 2 0.0 - 3.0    POC O2 SAT 95 94.0 - 98.0 %    O2 Device/Flow/% Adult Ventilator     Sample Site Arterial Line     FIO2 100.0    Arterial Blood Gas, POC   Result Value Ref Range    POC pH 7.387 7.350 - 7.450    POC pCO2 49.6 (H) 35.0 - 48.0 mm Hg    POC PO2 69.3 (L) 83.0 - 108.0 mm Hg    POC HCO3 29.8 (H) 21.0 - 28.0 mmol/L    Positive Base Excess, Art 4 (H) 0.0 - 3.0    POC O2 SAT 93 (L) 94.0 - 98.0 %    O2 Device/Flow/% Adult Ventilator     Aubrey Test NOT APPLICABLE     Sample Site Arterial Line     FIO2 100.0     Pt Temp 38.2     POC pH Temp 7.37     POC pCO2 Temp 52 mm Hg    POC pO2 Temp 75 mm Hg   POC Glucose Fingerstick   Result Value Ref Range    POC Glucose 229 (H) 65 - 105 mg/dL   EKG 12 Lead   Result Value Ref Range    Ventricular Rate 99 BPM    Atrial Rate 99 BPM    P-R Interval 148 ms    QRS Duration 84 ms    Q-T Interval 360 ms    QTc Calculation (Bazett) 462 ms    P Axis 46 degrees    R Axis 53 degrees    T Axis -17 degrees     Radiology/Imaging:  XR CHEST (SINGLE VIEW FRONTAL)   Final Result   Support lines and tubes appear satisfactory in positioning. Improved diffuse bilateral airspace disease. Increased right pleural effusion. Unchanged small left effusion. XR CHEST PORTABLE   Final Result   1. No pneumothorax      2. Diffuse bilateral airspace opacities      3. Small left pleural effusion         XR CHEST PORTABLE   Final Result   There is increasing bilateral airspace disease. Posteriorly layering pleural   fluid may contribute to the increasing opacity. No pneumothorax. Tubes and lines remain in good position.  Obesity hypoventilation syndrome ([facility]     H/O tracheostomy     STEPH (obstructive sleep apnea)     Chest pain 06/08/2018    Acute non-recurrent pansinusitis     Status post tracheostomy ([address] 02/17/2018    Status post insertion of percutaneous endoscopic gastrostomy (PEG) tube ([facility] 02/17/2018    Rhinovirus infection     Goals of care, counseling/discussion     Fever     Disease due to rhinovirus     ARDS (adult respiratory distress syndrome) ([facility] 02/02/2018    COPD exacerbation ([address]     NSTEMI (non-ST elevated myocardial infarction) ([address] 01/27/2018    Acute pulmonary edema ([address] 01/27/2018    Hypertensive emergency 01/27/2018    Acute respiratory failure with hypoxia and hypercapnia ([facility] 01/27/2018    Smoker 01/27/2018    Morbid obesity ([facility] 01/27/2018    SOB (shortness of breath) 01/27/2018    Pneumonia of both lower lobes due to infectious organism 01/26/2018    Asthma 09/21/2014    Pneumonia 09/21/2014    HTN (hypertension) 09/21/2014    Torn meniscus 09/21/2014      PLAN:     77-year-old female with acute on chronic hypercapnic hypoxic respiratory failure requiring max BiPAP with superimposed COVID, CHF and COPD, severe pulmonary hypertension. Intubated on 6/12 for decreasing oxygen saturation. ARDS protocol, nitric oxide started. ECMO team is consulted but declined patient. Concern for superimposed PNA with elevated CRP and procal.    PLAN/MEDICAL DECISION MAKING:  Neurologic:   Patient is intubated   Sedation: propofol > switch to versed, fentanyl    Cardiovascular:  · Echo ordered: EF 65% with RVSP 97mmHg  · COVID-induced respiratory failure with possible superimposed PNA causing sepsis and mild shock  · CHF  · Norepinephrine is off  · Nitroglycerin as needed for chest pain  · Bumetanide 1mg bid > increased to home dose bumex 2mg bid > decreased 6/13 to 1mg bid  Pulmonary:  · Patient on ventilator  · Patient's baseline is 60-80 at home.   Vent Information  Ventilator ID: [place] [id number]  Vent Mode: PRVC  · Ventilator Initiate: Yes   · Patient low 90s on 100% FiO2 Severe pulmonary hypertension  · Possible COPD exacerbation: duoneb, tessalon, mucinex ER held  · Patient on Rocephin, completed 5 days  · Worsening CXR  · Started zosyn , switched to linezolid, meropenem on  with blood cx drawn x2    GI/Nutrition  · Pepcid for GI prophylaxis  · Diabetic feeds    Renal/Fluid/Electrolyte  · Patient on bumetanide  · 500ml bolus given for low UOP,  maintenance fluids started  · Continue to monitor urine output  · Replete electrolytes as needed    ID  WBC:   Lab Results   Component Value Date    WBC 25.2 (H) 2022     Tmax: Temp (24hrs), Av.9 °F (38.8 °C), Min:100.4 °F (38 °C), Max:102.9 °F (39.4 °C)  ·   · Patient has been afebrile, hemodynamically stable, WBC 11.6. · Continue Rocephin daily for 5 days until 2022. · ID consulted for COVID worsening, starting remdesivir, meropenem, linezolid    Hematology:  Recent Labs     22  0502 22  0443 22  0739   HGB 11.8* 12.4 12.4   ·  stable  Endocrine:   glucose controlled - most recent BGL is   Recent Labs     22  0443 22  0739   GLUCOSE 245* 199*   Medium ISS changed to high ISS    DVT Prophylaxis  · Patient is on heparin for DVT prophylaxis.   Discharge Needs:  PT, OT, ST, SW and Case Management      CODE STATUS: Full Code    DISPOSITION:  [x] To remain ICU  [] OK for out of ICU from [address] [place] [address] Drive, MD  TY Resident  22 2:51 PM

## 2022-06-14 NOTE — PROGRESS NOTES
Pharmacy Note     Renal Dose Adjustment    Debbie Diaz is a 55 y.o. female. Pharmacist assessment of renally cleared medications. Recent Labs     06/13/22  0443 06/14/22  0739   BUN 40* 63*       Recent Labs     06/13/22  0443 06/14/22  0739   CREATININE 1.44* 2.35*       Estimated Creatinine Clearance: 41 mL/min (A) (based on SCr of 2.35 mg/dL (H)).   Estimated CrCl using Ideal Body Weight: 25 mL/min (based on IBW 45.5 kg)    Height:   Ht Readings from Last 1 Encounters:   06/09/22 5' (1.524 m)     Weight:  Wt Readings from Last 1 Encounters:   06/14/22 (!) 332 lb 1.6 oz (150.6 kg)       The following medication dose has been adjusted based upon renal function per P&T Guidelines:             Meropenem 1000mg IV Q8H to Q12H    Marlon Garcia PharmD [facility]  6/14/2022 11:15 AM

## 2022-06-14 NOTE — CARE COORDINATION
Transitional planning. Intubated/ Sedated / Paralyzed FiO2 100%, PEEP 20, May start ECMO. Referral sent to [facility], pt is current with [address] [facility], has VPS. Monitor for SNF need - PT/OT ordered. Palliative Care following.

## 2022-06-14 NOTE — PLAN OF CARE
Problem: Respiratory - Adult  Goal: Achieves optimal ventilation and oxygenation  6/14/2022 0950 by Akilah Waite RN  Outcome: Progressing  6/14/2022 0949 by Tamiko Javier RCP  Outcome: Progressing  6/13/2022 2018 by Mckenna Orona RN  Outcome: Progressing     Problem: Discharge Planning  Goal: Discharge to home or other facility with appropriate resources  6/14/2022 0950 by Akilah Waite RN  Outcome: Progressing  Flowsheets (Taken 6/14/2022 0800)  Discharge to home or other facility with appropriate resources: Identify barriers to discharge with patient and caregiver  6/13/2022 2018 by Mckenna Orona RN  Outcome: Progressing     Problem: Pain  Goal: Verbalizes/displays adequate comfort level or baseline comfort level  6/14/2022 0950 by Akilah Waite RN  Outcome: Progressing  6/13/2022 2018 by Mckenna Orona RN  Outcome: Progressing     Problem: Chronic Conditions and Co-morbidities  Goal: Patient's chronic conditions and co-morbidity symptoms are monitored and maintained or improved  6/14/2022 0950 by Akilah Waite RN  Outcome: Progressing  Flowsheets (Taken 6/14/2022 0800)  Care Plan - Patient's Chronic Conditions and Co-Morbidity Symptoms are Monitored and Maintained or Improved: Monitor and assess patient's chronic conditions and comorbid symptoms for stability, deterioration, or improvement  6/13/2022 2018 by Mckenna Orona RN  Outcome: Progressing  Flowsheets (Taken 6/13/2022 2000)  Care Plan - Patient's Chronic Conditions and Co-Morbidity Symptoms are Monitored and Maintained or Improved: Monitor and assess patient's chronic conditions and comorbid symptoms for stability, deterioration, or improvement     Problem: Skin/Tissue Integrity  Goal: Absence of new skin breakdown  Description: 1. Monitor for areas of redness and/or skin breakdown  2. Assess vascular access sites hourly  3. Every 4-6 hours minimum:  Change oxygen saturation probe site  4.   Every 4-6 hours:  If on nasal continuous positive airway pressure, respiratory therapy assess nares and determine need for appliance change or resting period. 6/14/2022 0950 by Rusty Ricketts RN  Outcome: Progressing  6/13/2022 2018 by Kelly Mon RN  Outcome: Progressing     Problem: Safety - Adult  Goal: Free from fall injury  6/14/2022 0950 by Rusty Ricketts RN  Outcome: Progressing  Flowsheets (Taken 6/14/2022 0800)  Free From Fall Injury: Instruct family/caregiver on patient safety  6/13/2022 2018 by Kelly Mon RN  Outcome: Progressing  [address] (Taken 6/13/2022 2017)  Free From Fall Injury: Instruct family/caregiver on patient safety     Problem: ABCDS Injury Assessment  Goal: Absence of physical injury  6/14/2022 0950 by Rusty Ricketts RN  Outcome: Progressing  Flowsheets (Taken 6/14/2022 0800)  Absence of Physical Injury: Implement safety measures based on patient assessment  6/13/2022 2018 by Kelly Mon RN  Outcome: Progressing  Flowsheets (Taken 6/13/2022 2017)  Absence of Physical Injury: Implement safety measures based on patient assessment     Problem: Confusion  Goal: Confusion, delirium, dementia, or psychosis is improved or at baseline  Description: INTERVENTIONS:  1. Assess for possible contributors to thought disturbance, including medications, impaired vision or hearing, underlying metabolic abnormalities, dehydration, psychiatric diagnoses, and notify attending LIP  2. [place] high risk fall precautions, as indicated  3. Provide frequent short contacts to provide reality reorientation, refocusing and direction  4. Decrease environmental stimuli, including noise as appropriate  5. Monitor and intervene to maintain adequate nutrition, hydration, elimination, sleep and activity  6. If unable to ensure safety without constant attention obtain sitter and review sitter guidelines with assigned personnel  7.  Initiate Psychosocial CNS and Spiritual Care consult, as indicated  6/14/2022 0950 by Katherine Rosado Melissa Saavedra RN  Outcome: Progressing  6/13/2022 2018 by Kyaw Wilkinson RN  Outcome: Progressing     Problem: Nutrition Deficit:  Goal: Optimize nutritional status  6/14/2022 0950 by Adair Mcgee RN  Outcome: Progressing  6/13/2022 2018 by Kyaw Wilkinson RN  Outcome: Progressing

## 2022-06-14 NOTE — PLAN OF CARE
Problem: Respiratory - Adult  Goal: Achieves optimal ventilation and oxygenation  6/14/2022 1956 by Flavia Sanchez RN  Outcome: Progressing  6/14/2022 0950 by Cristopher Thayer RN  Outcome: Progressing  6/14/2022 0949 by Katherin Cunningham RCP  Outcome: Progressing     Problem: Discharge Planning  Goal: Discharge to home or other facility with appropriate resources  6/14/2022 1956 by Flavia Sanchez RN  Outcome: Progressing  6/14/2022 0950 by Cristopher Thayer RN  Outcome: Progressing  Flowsheets (Taken 6/14/2022 0800)  Discharge to home or other facility with appropriate resources: Identify barriers to discharge with patient and caregiver     Problem: Pain  Goal: Verbalizes/displays adequate comfort level or baseline comfort level  6/14/2022 1956 by Flavia Sanchez RN  Outcome: Progressing  6/14/2022 0950 by Cristopher Thayer RN  Outcome: Progressing     Problem: Chronic Conditions and Co-morbidities  Goal: Patient's chronic conditions and co-morbidity symptoms are monitored and maintained or improved  6/14/2022 1956 by Flavia Sanchez RN  Outcome: Progressing  6/14/2022 0950 by Cristopher Thayer RN  Outcome: Progressing  Flowsheets (Taken 6/14/2022 0800)  Care Plan - Patient's Chronic Conditions and Co-Morbidity Symptoms are Monitored and Maintained or Improved: Monitor and assess patient's chronic conditions and comorbid symptoms for stability, deterioration, or improvement     Problem: Skin/Tissue Integrity  Goal: Absence of new skin breakdown  Description: 1. Monitor for areas of redness and/or skin breakdown  2. Assess vascular access sites hourly  3. Every 4-6 hours minimum:  Change oxygen saturation probe site  4. Every 4-6 hours:  If on nasal continuous positive airway pressure, respiratory therapy assess nares and determine need for appliance change or resting period.   6/14/2022 1956 by Flavia Sanchez RN  Outcome: Progressing  6/14/2022 0950 by Cristopher Thayer RN  Outcome: Progressing Problem: Safety - Adult  Goal: Free from fall injury  6/14/2022 1956 by Jamison Childress RN  Outcome: Progressing  6/14/2022 0950 by Shahriar Cartagena RN  Outcome: Progressing  Flowsheets (Taken 6/14/2022 0800)  Free From Fall Injury: Instruct family/caregiver on patient safety     Problem: ABCDS Injury Assessment  Goal: Absence of physical injury  6/14/2022 1956 by Jamison Childress RN  Outcome: Progressing  [address] (Taken 6/14/2022 1950)  Absence of Physical Injury: Implement safety measures based on patient assessment  6/14/2022 0950 by Shahriar Cartagena RN  Outcome: Progressing  Flowsheets (Taken 6/14/2022 0800)  Absence of Physical Injury: Implement safety measures based on patient assessment     Problem: Confusion  Goal: Confusion, delirium, dementia, or psychosis is improved or at baseline  Description: INTERVENTIONS:  1. Assess for possible contributors to thought disturbance, including medications, impaired vision or hearing, underlying metabolic abnormalities, dehydration, psychiatric diagnoses, and notify attending LIP  2. [place] high risk fall precautions, as indicated  3. Provide frequent short contacts to provide reality reorientation, refocusing and direction  4. Decrease environmental stimuli, including noise as appropriate  5. Monitor and intervene to maintain adequate nutrition, hydration, elimination, sleep and activity  6. If unable to ensure safety without constant attention obtain sitter and review sitter guidelines with assigned personnel  7.  Initiate Psychosocial CNS and Spiritual Care consult, as indicated  6/14/2022 1956 by Jamison Childress RN  Outcome: Progressing  6/14/2022 0950 by Shahriar Cartagena RN  Outcome: Progressing     Problem: Nutrition Deficit:  Goal: Optimize nutritional status  6/14/2022 1956 by Jamison Childress RN  Outcome: Progressing  6/14/2022 0950 by Shahriar Cartagena RN  Outcome: Progressing

## 2022-06-14 NOTE — PROGRESS NOTES
[facility] -   Infectious diseases evaluation  admission date 6/8/2022    reason for consultation:   bandemia    Impression :   Current:  · covid +  · bilat pulm infiltrates- ARDS vs multifocal pneumonia   · P edema  · PULM HYPERTENSION  · COPD  · resp failure and intubated 6/12  · Severe sepsis and mild shock 6/13  · bandemia 6/13  · procal elevated 14- 6/13    Other:  ·   Discussion / summary of stay / plan of care   · Pt very ill- declined for ECMO  · ARDS protocole - ECMO team on board  · SA pneumonia  - secretions yellow thick, before paralytics  · Severe sepsis a mild shock -  · Doubt covid pneumonia component  · procal 14 elevated   · Fever and leukocytosis since 6/13 but resp distress since admission, getting worse and initially refused intubation  ·   Recommendations   · rec dose steroids only for ARDS, doubt covid related pneumonia at this point- get ferritin fibrinogen   · remdesevir 5 days till 6/17 to avoid progression of the covid  · zyvox and meropenem 6/14- adjust to SA sensi and to Bc results  · Case disc w CC    Infection Control Recommendations   · Universal Precautions  · Droplet + Isolation      Antimicrobial Stewardship Recommendations   · Simplification of therapy  · Targeted therapy      History of Present Illness:   Initial history:  Tree Tanner is a 55y.o.-year-old female obese with a history of severe pulmonary hypertension and usually saturates 88 to 92% at home, comes in with increasing shortness of breath and desaturated in the ER in the low 80s. Tested positive for COVID. Was described to be wheezing in the ER and was maintained on BiPAP.   Diuresed initially for concern of CHF exacerbation, 100% FIo2 bipap   CXR shows bilat pulm congestion getting worse - and pt has been on decadron and ceftriaxone since 6/10  Due to increase in WBC 26, ID called - pt has a LGF    Pt had been desaturating and only accepted intubation 6/12 -  Intubated and now desaturating despite ARDS protocole and ECMO team consulted. Sp yellow thick will be sent for cx  Obese and no cellulitis      Interval changes  6/14/2022   Patient Vitals for the past 8 hrs:   BP Temp Temp src Pulse Resp SpO2 Weight   06/14/22 1000    (!) 111 27 (!) 87 %    06/14/22 0930    (!) 110 27 (!) 86 %    06/14/22 0900 (!) 95/58   (!) 111 27 (!) 83 %    06/14/22 0830 99/65   (!) 108 27 (!) 88 %    06/14/22 0800 93/61 (!) 100.8 °F (38.2 °C) Oral (!) 108 27 (!) 88 %    06/14/22 0700 95/62   (!) 111 27 (!) 81 %    06/14/22 0600 99/64 (!) 102.6 °F (39.2 °C) CORE (!) 110 27 (!) 82 %    06/14/22 0500 97/72   (!) 107 27 (!) 86 %    06/14/22 0400 (!) 87/59 (!) 102.6 °F (39.2 °C) CORE (!) 105 27 (!) 87 %    06/14/22 0323    (!) 105 27 (!) 83 %    06/14/22 0300 95/65   (!) 103 27 (!) 87 % (!) 332 lb 1.6 oz (150.6 kg)     Fever since 6/13  Refused for ECMO  On low pressors on off  No fem line  Sp  FIO2 100 and will be proned  Sp cx w clusters, SA pend sensi as p[er lab    Summary of relevant labs:  Labs:  WBC 11-9-26  creat 1.44      Micro:  resp PCR is COVID +  Imaging:  CXR bilat increasing airspace disease 6/12/22  Lungs nearly completely opacified      I have personally reviewed the past medical history, past surgical history, medications, social history, and family history, and I haveupdated the database accordingly. Allergies:   Bee venom, Beta adrenergic blockers, Sulfa antibiotics, Asa [aspirin], Aspirin, Beta adrenergic blockers, and Sulfa antibiotics     Review of Systems:     Review of Systems   Unable to perform ROS: Intubated       Physical Examination :       Physical Exam  Constitutional:       Appearance: She is obese. She is ill-appearing. HENT:      Head: Normocephalic and atraumatic. Nose: Nose normal.      Mouth/Throat:      Mouth: Mucous membranes are moist.   Eyes:      General: No scleral icterus.   Cardiovascular:      Rate and Rhythm: Regular rhythm. Tachycardia present. Heart sounds: No murmur heard. No friction rub. Pulmonary:      Breath sounds: No stridor. Abdominal:      Palpations: There is no mass. Hernia: No hernia is present. Genitourinary:     Comments: Urine torie  Musculoskeletal:         General: No swelling or tenderness. Cervical back: Neck supple. No rigidity. Skin:     Coloration: Skin is not jaundiced or pale.    Neurological:      Comments: intubated   Psychiatric:      Comments: intubated          Past Medical History:     Past Medical History:   Diagnosis Date    Acute on chronic diastolic CHF (congestive heart failure) ([address] 09/15/2018    HIEN (acute kidney injury) ([address] 2019    HIEN (acute kidney injury) ([address] 2018    Asthma     CHF     Chronic obstructive lung disease ([address]     Chronic respiratory failure with hypoxia ([address]     on home O2 therapy    COPD     COVID-19 virus infection 2022    Diabetes mellitus, new onset ([address] 2019    Former smoker     quit smoking about 17 months ago/ She has a 22.00 pack-year smoking history    HTN     Hyperglycemia     Hyperlipidemia     Hypertension     Morbid obesity with BMI of 60.0-69.9, adult ([address]     Neuromuscular disorder ([address]     Neuropathy Right hand    STEPH on CPAP     DME per Dr. Anuja Diaz for CPAP of 18 cmh2o    Oxygen dependent     DME 2018 for home oxgyen at 2.5-3 lpm ATC    Pedal edema     Pneumonia     Pulmonary hypertension, moderate to severe ([address] 2018    Torn meniscus     Wears glasses        Past Surgical  History:     Past Surgical History:   Procedure Laterality Date    ABDOMEN SURGERY      Patient had a PEG tube placed 2018, removed 2018     SECTION  1997    CYSTOSCOPY  2019    CYSTOSCOPY N/A 2019    CYSTOSCOPY performed by Shayna Cordova MD at [facility]  2018    Removed in 2018    [facility]. CATH POWER PICC TRIPLE  2018  JOINT REPLACEMENT      MA OFFICE/OUTPT VISIT,PROCEDURE ONLY N/A 2018    TRACHEOTOMY performed by Chelly Holm MD at [address]  2018    TRACHEOTOMY  2018       Medications:      lactated ringers bolus  500 mL IntraVENous Once    linezolid  600 mg IntraVENous Q12H    meropenem  1,000 mg IntraVENous Q8H    levofloxacin  500 mg IntraVENous Q24H    insulin lispro  0-18 Units SubCUTAneous Q4H    midodrine  5 mg Oral TID WC    bumetanide  1 mg IntraVENous BID    famotidine  20 mg Oral Daily    gabapentin  400 mg Oral BID    remdesivir IVPB  100 mg IntraVENous Q24H    [Held by provider] docusate sodium  100 mg Oral Daily    dexamethasone  6 mg IntraVENous Q24H    cetirizine  10 mg Oral Daily    fluticasone  1 spray Nasal Daily    sodium chloride flush  5-40 mL IntraVENous 2 times per day    ipratropium-albuterol  1 ampule Inhalation 4x daily    heparin (porcine)  5,000 Units SubCUTAneous 3 times per day    [Held by provider] guaiFENesin  600 mg Oral BID       Social History:     Social History     Socioeconomic History    Marital status: Single     Spouse name: Not on file    Number of children: Not on file    Years of education: Not on file    Highest education level: Not on file   Occupational History    Not on file   Tobacco Use    Smoking status: Former Smoker     Packs/day: 1.00     Years: 22.00     Pack years: 22.00     Types: Cigarettes     Start date:      Quit date: 1/15/2018     Years since quittin.4    Smokeless tobacco: Never Used   Vaping Use    Vaping Use: Never used   Substance and Sexual Activity    Alcohol use: No    Drug use: No    Sexual activity: Not Currently   Other Topics Concern    Not on file   Social History Narrative    ** Merged History Encounter **          Social Determinants of Health     Financial Resource Strain: Low Risk     Difficulty of Paying Living Expenses: Not hard at all   Food Insecurity: Food Insecurity Present    Worried About Running Out of Food in the Last Year: Never true    Obdulio of Food in the Last Year: Sometimes true   Transportation Needs: No Transportation Needs    Lack of Transportation (Medical): No    Lack of Transportation (Non-Medical): No   Physical Activity: Inactive    Days of Exercise per Week: 0 days    Minutes of Exercise per Session: 0 min   Stress: Stress Concern Present    Feeling of Stress : To some extent   Social Connections: Moderately Integrated    Frequency of Communication with Friends and Family: Three times a week    Frequency of Social Gatherings with Friends and Family:  Three times a week    Attends Sabianist Services: 1 to 4 times per year    Active Member of Clubs or Organizations: No    Attends Club or Organization Meetings: 1 to 4 times per year    Marital Status: Never    Intimate Partner Violence: Not At Risk    Fear of Current or Ex-Partner: No    Emotionally Abused: No    Physically Abused: No    Sexually Abused: No   Housing Stability: Unknown    Unable to Pay for Housing in the Last Year: No    Number of [place] in the Last Year: Not on file    Unstable Housing in the Last Year: No       Family History:     Family History   Problem Relation Age of Onset    Emphysema Mother     Alzheimer's Disease Mother     Other Mother         Blood disorder    High Blood Pressure Father     Arthritis Father     Diabetes Sister     Heart Failure Sister     Kidney Disease Sister     High Blood Pressure Brother     Dementia Maternal Grandmother     High Blood Pressure Maternal Grandmother     Dementia Maternal Grandfather     High Blood Pressure Maternal Grandfather     Cancer Paternal Grandmother     Heart Disease Paternal Grandfather     Diabetes Sister     Heart Failure Sister     Other Sister         Intestinal problems    No Known Problems Sister     No Known Problems Son       Medical Decision Making:   I have independently reviewed/ordered the following labs:    CBC with Differential:   Recent Labs     06/13/22  0443 06/14/22  0739   WBC 26.1* 25.2*   HGB 12.4 12.4   HCT 40.0 40.7    291   LYMPHOPCT 5* 5*   MONOPCT 7 7     BMP:  Recent Labs     06/13/22  0443 06/14/22  0739    140   K 3.5* 4.8   CL 91* 94*   CO2 27 21   BUN 40* 63*   CREATININE 1.44* 2.35*   MG 1.7  --      Hepatic Function Panel: No results for input(s): PROT, LABALBU, BILIDIR, IBILI, BILITOT, ALKPHOS, ALT, AST in the last 72 hours. No results for input(s): RPR in the last 72 hours. No results for input(s): HIV in the last 72 hours. No results for input(s): BC in the last 72 hours. Lab Results   Component Value Date    CREATININE 2.35 06/14/2022    GLUCOSE 199 06/14/2022       Detailed results: Thank you for allowing us to participate in the care of this patient. Please call with questions. This note is created with the assistance of a speech recognition program.  While intending to generate adocument that actually reflects the content of the visit, the document can still have some errors including those of syntax and sound a like substitutions which may escape proof reading. It such instances, actual meaningcan be extrapolated by contextual diversion.     Nito Pino MD  Office: [phone]  Perfect serve / office [phone]

## 2022-06-14 NOTE — PLAN OF CARE
Problem: Respiratory - Adult  Goal: Achieves optimal ventilation and oxygenation  6/13/2022 2018 by Anais Snyder RN  Outcome: Progressing  6/13/2022 1249 by Mina Brownlee RN  Outcome: Not Progressing  6/13/2022 0800 by Mike Beard RCP  Outcome: Progressing     Problem: Discharge Planning  Goal: Discharge to home or other facility with appropriate resources  6/13/2022 2018 by Anais Snyder RN  Outcome: Progressing  6/13/2022 1249 by Mina Brownlee RN  Outcome: Not Progressing     Problem: Pain  Goal: Verbalizes/displays adequate comfort level or baseline comfort level  6/13/2022 2018 by Anais Snyder RN  Outcome: Progressing  6/13/2022 1249 by Mina Brownlee RN  Outcome: Not Progressing     Problem: Chronic Conditions and Co-morbidities  Goal: Patient's chronic conditions and co-morbidity symptoms are monitored and maintained or improved  6/13/2022 2018 by Anais Snyder RN  Outcome: Progressing  Flowsheets (Taken 6/13/2022 2000)  Care Plan - Patient's Chronic Conditions and Co-Morbidity Symptoms are Monitored and Maintained or Improved: Monitor and assess patient's chronic conditions and comorbid symptoms for stability, deterioration, or improvement  6/13/2022 1249 by Mina Brownlee RN  Outcome: Not Progressing  Flowsheets (Taken 6/13/2022 0800)  Care Plan - Patient's Chronic Conditions and Co-Morbidity Symptoms are Monitored and Maintained or Improved:   Monitor and assess patient's chronic conditions and comorbid symptoms for stability, deterioration, or improvement   Collaborate with multidisciplinary team to address chronic and comorbid conditions and prevent exacerbation or deterioration     Problem: Skin/Tissue Integrity  Goal: Absence of new skin breakdown  Description: 1. Monitor for areas of redness and/or skin breakdown  2. Assess vascular access sites hourly  3. Every 4-6 hours minimum:  Change oxygen saturation probe site  4.   Every 4-6 hours:  If on nasal continuous positive airway pressure, respiratory therapy assess nares and determine need for appliance change or resting period. 6/13/2022 2018 by Josiah Douglas RN  Outcome: Progressing  6/13/2022 1249 by Diane Ascencio RN  Outcome: Not Progressing     Problem: Safety - Adult  Goal: Free from fall injury  6/13/2022 2018 by Josiah Douglas RN  Outcome: Sena Walsh (Taken 6/13/2022 2017)  Free From Fall Injury: Instruct family/caregiver on patient safety  6/13/2022 1249 by Diane Ascencio RN  Outcome: Not Progressing     Problem: ABCDS Injury Assessment  Goal: Absence of physical injury  6/13/2022 2018 by Josiah Douglas RN  Outcome: Progressing  Flowsheets (Taken 6/13/2022 2017)  Absence of Physical Injury: Implement safety measures based on patient assessment  6/13/2022 1249 by Diane Ascencio RN  Outcome: Not Progressing  Flowsheets (Taken 6/13/2022 0800)  Absence of Physical Injury: Implement safety measures based on patient assessment     Problem: Confusion  Goal: Confusion, delirium, dementia, or psychosis is improved or at baseline  Description: INTERVENTIONS:  1. Assess for possible contributors to thought disturbance, including medications, impaired vision or hearing, underlying metabolic abnormalities, dehydration, psychiatric diagnoses, and notify attending LIP  2. [place] high risk fall precautions, as indicated  3. Provide frequent short contacts to provide reality reorientation, refocusing and direction  4. Decrease environmental stimuli, including noise as appropriate  5. Monitor and intervene to maintain adequate nutrition, hydration, elimination, sleep and activity  6. If unable to ensure safety without constant attention obtain sitter and review sitter guidelines with assigned personnel  7.  Initiate Psychosocial CNS and Spiritual Care consult, as indicated  6/13/2022 2018 by Josiah Douglas RN  Outcome: Progressing  6/13/2022 1249 by Diane Ascencio RN  Outcome: Not Progressing     Problem: Nutrition Deficit:  Goal: Optimize nutritional status  Outcome: Progressing

## 2022-06-15 NOTE — PROGRESS NOTES
Occupational [id number] [email or web address]  Occupational Therapy Not Seen Note    DATE: 6/15/2022    NAME: Jerod Hernandez  MRN: [id number]   : 1975      Patient not seen this date for Occupational Therapy due to:      Patient is not appropriate for OT evaluation/treatment at this time d/t Intubated/ Sedated / Proning     Electronically signed by SHLOMO Barriga on 6/15/2022 at 2:36 PM

## 2022-06-15 NOTE — PLAN OF CARE
Problem: Respiratory - Adult  Goal: Achieves optimal ventilation and oxygenation  6/15/2022 1459 by Shabbir Meehan RN  Outcome: Progressing  6/15/2022 0836 by Benita Nieves RCP  Outcome: Progressing     Problem: Discharge Planning  Goal: Discharge to home or other facility with appropriate resources  Outcome: Progressing     Problem: Pain  Goal: Verbalizes/displays adequate comfort level or baseline comfort level  Outcome: Progressing     Problem: Chronic Conditions and Co-morbidities  Goal: Patient's chronic conditions and co-morbidity symptoms are monitored and maintained or improved  Outcome: Progressing     Problem: Skin/Tissue Integrity  Goal: Absence of new skin breakdown  Description: 1. Monitor for areas of redness and/or skin breakdown  2. Assess vascular access sites hourly  3. Every 4-6 hours minimum:  Change oxygen saturation probe site  4. Every 4-6 hours:  If on nasal continuous positive airway pressure, respiratory therapy assess nares and determine need for appliance change or resting period. 6/15/2022 1459 by Shabbir Meehan RN  Outcome: Progressing  6/15/2022 0836 by Benita Nieves RCP  Outcome: Progressing     Problem: Safety - Adult  Goal: Free from fall injury  Outcome: Progressing     Problem: ABCDS Injury Assessment  Goal: Absence of physical injury  Outcome: Progressing     Problem: Confusion  Goal: Confusion, delirium, dementia, or psychosis is improved or at baseline  Description: INTERVENTIONS:  1. Assess for possible contributors to thought disturbance, including medications, impaired vision or hearing, underlying metabolic abnormalities, dehydration, psychiatric diagnoses, and notify attending LIP  2. [place] high risk fall precautions, as indicated  3. Provide frequent short contacts to provide reality reorientation, refocusing and direction  4. Decrease environmental stimuli, including noise as appropriate  5.  Monitor and intervene to maintain adequate nutrition, hydration, elimination, sleep and activity  6. If unable to ensure safety without constant attention obtain sitter and review sitter guidelines with assigned personnel  7.  Initiate Psychosocial CNS and Spiritual Care consult, as indicated  Outcome: Progressing     Problem: Nutrition Deficit:  Goal: Optimize nutritional status  Outcome: Progressing

## 2022-06-15 NOTE — PROGRESS NOTES
Physical Therapy        Physical Therapy Cancel Note      DATE: 6/15/2022    NAME: Jenny Gomes  MRN: [id number]   : 1975      Patient not seen this date for Physical Therapy due to:    Patient is not appropriate for PT evaluation/treatment at this time d/t Intubated/ Sedated / Proning       Electronically signed by Nitin Denise PTA on 6/15/2022 at 2:16 PM

## 2022-06-15 NOTE — CARE COORDINATION
Transitional planning note: patient remains intubated and sedated. Currently being proned on vent with FiO2 100%. Rachele Fleming is following. Patient did previously home care with [address] and is current with them.

## 2022-06-15 NOTE — PROGRESS NOTES
INTENSIVE CARE UNIT  Resident Physician Progress Note    Patient - Yissel Gotti  Date of Admission -  6/8/2022  6:12 AM  Date of Evaluation -  6/15/2022  Room and Bed Number -  5203/9962-43   Hospital Day - 7    SUBJECTIVE:     OVERNIGHT EVENTS:      Patient was placed supine at 3am. Overnight, patient is febrile up to 102. Tachycardic overnight, HR now in the 80s. BP on cuff is 82/54. Arterial line placement was unsuccessful yesterday. Patient remains on meropenem, linezolid, and remdesivir. Respiratory culture growing MRSA, susceptibility pending. Blood cx pending. Labs not drawn, CBC, BMP pending.  FLUIDS: LR 100ml/h   FEEDING: dietician consulted for feedings   FAMILY: sister Jamia Rodriguez is POA/ spokesperson for the family. She live 15 min away. Attempted to call adult son, however the phone number listed does not work.  ANALGESICS: prn oxycodone-acetaminophen   SEDATION: versed, Fentanyl   THROMBO- PROPHYLAXIS: Heparin   MOBILIZATION: on vent   HEADS UP: yes   HEMODYNAMICS: norepinephrine off since 6/13   ULCER PROPHYLAXIS: Pepcid 20mg daily   GLYCEMIC CONTROL: high sliding scale   SPONTANEOUS BREATHING TRIAL: no SBT   BOWEL MANAGEMENT: glycolax daily prn, dosucate 100mg daily, constipated day prior to intubation bisacodyl 10mg daily given with large BM resulting   INDWELLING CATHETER: inserted 6/12/22   DRUG DE-ESCALATION: ceftriaxone 5 day ended 6/12, started zosyn 6/13 empirical coverage, switched to merepenem, linezolid, and remdesivir    HISTORY OF PRESENT ILLNESS:    17-year-old female admitted for hypoxic respiratory failure requiring maximum BiPAP settings, 80% FiO2 secondary to COVID induced respiratory failure with acute on chronic CHF and/or COPD exacerbation with pulmonary hypertension. Patient is treating treated with ceftriaxone, diuretics, BiPAP, breathing treatments. 6/11 Patient required continuous BiPAP overnight, FiO2 100%. Patient feels constipated and has a cough. Overall, is feeling better today. Denies any pain during exam, including chest pain. Satting 88% to low 90s when switched to HFNC. *Update: patient reports a lot of discomfort/ pain secondary to constipation. Bisacodyl added. 6/12 patient was satting 80s overnight. She had previously refused intubation, saying she is in the 80s at home. Saturation been accepting of intubation. Patient was intubated and placed on propofol and fentanyl. Saturating 70/ 80s, ARDS protocol with low Tv and high RR started as well as nitric oxide. 6/13   Overnight, patient was satting in the low 80s with high vent settings, tachycardia, and hypotension. Low urine output overnight. ECMO team consulted. Some improvement to low 90s. Spoke to sister, Maida Arvizu, who was updated on patient's current condition. Sister unsure whether to wait at waiting room/ bedside/ by their phones and states she lives 15 minutes away and can be at the hospital within 30min. Advised that patient's condition is critical and can rapidly change thus at least have phone handy. Updated on slight improvement in oxygenation saturation and ECMO evaluation. 6/14  Overnight, patient is febrile up to 102.4, with HR in low 110s. Satting high 80s. Due to elevated procal and CRP, will get blood cultures and start on antibiotics. Patient was declined for ECMO due to pulmonary hypertension. Patient was proned this morning.      OBJECTIVE:     VITAL SIGNS:  Patient Vitals for the past 8 hrs:   BP Temp Temp src Pulse Resp SpO2   06/15/22 0700 (!) 82/54   86 27 98 %   06/15/22 0600 (!) 89/58 100.2 °F (37.9 °C) CORE 83 27 99 %   06/15/22 0500 (!) 88/56   85 27 100 %   06/15/22 0400 (!) 93/56 100.4 °F (38 °C) CORE 88 27 99 %   06/15/22 0306    95 25 97 %   06/15/22 0300    96 25 99 %   06/15/22 0200 99/70 (!) 100.6 °F (38.1 °C) CORE 98 27 99 %   06/15/22 0100 98/68   99 27 100 %   06/15/22 0016    (!) 101 27 99 %   06/15/22 0000 110/69 (!) 101.1 °F (38.4 °C) CORE (!) 101 27 99 %     Last Body weight:   Wt Readings from Last 3 Encounters:   22 (!) 332 lb 1.6 oz (150.6 kg)   22 (!) 334 lb 3.5 oz (151.6 kg)   22 (!) 355 lb (161 kg)     Body Mass Index : Body mass index is 64.86 kg/m². Tmax over 24 hours: Temp (24hrs), Av.3 °F (38.5 °C), Min:100.2 °F (37.9 °C), Max:102.9 °F (39.4 °C)    Ins/Outs: In: 4186.4 [I.V.:2405. 2; NG/GT:207]  Out: 1064 [Urine:1064]    PHYSICAL EXAM:  Constitutional: BMI 65, now laying supine  EENT: ventilator, trach scar present  Neck: short neck  Respiratory: auscultation has rhonchi and scattered wheezes bilaterally  Cardiovascular:  Regular rate   Abdomen: Soft, nontender  Extremities: Bilateral lower and upper extremity edema, minimal pitting    MEDICATIONS:  Scheduled Meds:   linezolid  600 mg IntraVENous Q12H    meropenem  1,000 mg IntraVENous Q12H    insulin lispro  0-18 Units SubCUTAneous Q4H    midodrine  5 mg Oral TID WC    bumetanide  1 mg IntraVENous BID    famotidine  20 mg Oral Daily    gabapentin  400 mg Oral BID    remdesivir IVPB  100 mg IntraVENous Q24H    [Held by provider] docusate sodium  100 mg Oral Daily    cetirizine  10 mg Oral Daily    fluticasone  1 spray Nasal Daily    sodium chloride flush  5-40 mL IntraVENous 2 times per day    ipratropium-albuterol  1 ampule Inhalation 4x daily    heparin (porcine)  5,000 Units SubCUTAneous 3 times per day    [Held by provider] guaiFENesin  600 mg Oral BID       Continuous Infusions:   lactated ringers 100 mL/hr at 06/15/22 0700    midazolam 7 mg/hr (06/15/22 0700)    fentaNYL 50 mcg/mL 100 mcg/hr (06/15/22 0700)    propofol Stopped (22 1223)    norepinephrine Stopped (22 1621)    cisatracurium (NIMBEX) infusion 2.5 mcg/kg/min (06/15/22 0700)    sodium chloride 5 mL/hr at 06/15/22 0700    dextrose         PRN Meds:   albuterol, 2.5 mg, Q4H PRN  artificial tears, , PRN  sodium chloride, 30 mL, PRN  bisacodyl, 10 mg, Daily PRN  oxyCODONE-acetaminophen, 1 tablet, Q4H PRN   And  oxyCODONE, 5 mg, Q4H PRN  sodium chloride flush, 5-40 mL, PRN  sodium chloride, , PRN  ondansetron, 4 mg, Q8H PRN   Or  ondansetron, 4 mg, Q6H PRN  polyethylene glycol, 17 g, Daily PRN  acetaminophen, 650 mg, Q6H PRN   Or  acetaminophen, 650 mg, Q6H PRN  glucose, 4 tablet, PRN  dextrose bolus, 125 mL, PRN   Or  dextrose bolus, 250 mL, PRN  glucagon (rDNA), 1 mg, PRN  dextrose, 100 mL/hr, PRN        SUPPORT DEVICES: [x] Ventilator [] BIPAP  [] Nasal Cannula [] Room Air    Vent Information  Ventilator ID: [id number]  Vent Mode: [facility]  Ventilator Initiate: Yes  Additional Respiratory Assessments  Heart Rate: 86  Resp: 27  SpO2: 98 %  End Tidal CO2: 37 (%)  Position: Supine  Humidification Source: Heated wire  Humidification Temp: 37  Circuit Condensation: Drained  Cuff Pressure (cm H2O):  (mlt)  Lab Results   Component Value Date    MODE [facility] 06/13/2022     ABGs:   Arterial Blood Gas result:  pH 7.4; pCO2 45 pO2 61; HCO3 28; %O2 Sat 95%.     DATA:  Complete Blood Count:   Recent Labs     06/13/22  0443 06/14/22  0739   WBC 26.1* 25.2*   RBC 4.57 4.64   HGB 12.4 12.4   HCT 40.0 40.7   MCV 87.5 87.7   MCH 27.1 26.7   MCHC 31.0 30.5   RDW 14.5* 14.5*    291   MPV 11.3 12.2        Last 3 Blood Glucose:   Recent Labs     06/13/22  0443 06/14/22  0739   GLUCOSE 245* 199*        PT/INR:    Lab Results   Component Value Date    PROTIME 10.6 06/29/2021    INR 1.0 06/29/2021     PTT:    Lab Results   Component Value Date    APTT 20.1 06/29/2021       Basic Metabolic Profile:   Recent Labs     06/13/22  0443 06/14/22  0739    140   K 3.5* 4.8   CL 91* 94*   CO2 27 21   BUN 40* 63*   CREATININE 1.44* 2.35*   GLUCOSE 245* 199*       Liver Function:  Recent Labs     06/14/22  0739   PROT 7.1   LABALBU 2.9*   *   *   ALKPHOS 142*   BILITOT 0.89       Magnesium:   Lab Results   Component Value Date    MG 1.7 06/13/2022    MG 1.8 03/27/2022 MG 1.6 03/25/2022     Phosphorus:   Lab Results   Component Value Date    PHOS 2.4 02/22/2019    PHOS 3.8 02/24/2018    PHOS 2.6 02/22/2018     Ionized Calcium:   Lab Results   Component Value Date    CAION 1.05 02/22/2019    CAION 1.11 02/22/2018    CAION 1.15 02/06/2018        Urinalysis:   Lab Results   Component Value Date    NITRU NEGATIVE 06/12/2022    COLORU Yellow 06/12/2022    PHUR 6.0 06/12/2022    WBCUA None 06/12/2022    RBCUA 2 TO 5 06/12/2022    MUCUS NOT REPORTED 05/08/2019    TRICHOMONAS NOT REPORTED 05/08/2019    YEAST FEW 03/27/2022    BACTERIA FEW 05/08/2019    SPECGRAV 1.018 06/12/2022    LEUKOCYTESUR NEGATIVE 06/12/2022    UROBILINOGEN Normal 06/12/2022    BILIRUBINUR NEGATIVE 06/12/2022    GLUCOSEU NEGATIVE 06/12/2022    KETUA NEGATIVE 06/12/2022    AMORPHOUS NOT REPORTED 05/08/2019       HgBA1c:    Lab Results   Component Value Date    LABA1C 6.8 06/08/2022     TSH:    Lab Results   Component Value Date    TSH 2.36 02/23/2019     Lactic Acid: No results found for: LACTA   Troponin: No results for input(s): TROPONINI in the last 72 hours. Other Labs:  Results for orders placed or performed during the hospital encounter of 06/08/22   Respiratory Panel, Molecular, with COVID-19 (Restricted: peds pts or suitable admitted adults)    Specimen: Nasopharyngeal Swab   Result Value Ref Range    Specimen Description . NASOPHARYNGEAL SWAB     Adenovirus PCR Not Detected Not Detected    Coronavirus 229E PCR Not Detected Not Detected    Coronavirus HKU1 PCR Not Detected Not Detected    Coronavirus NL63 PCR Not Detected Not Detected    Coronavirus OC43 PCR Not Detected Not Detected    SARS-CoV-2, PCR DETECTED (A) Not Detected    Human Metapneumovirus PCR Not Detected Not Detected    Rhino/Enterovirus PCR Not Detected Not Detected    Influenza A by PCR Not Detected Not Detected    Influenza B by PCR Not Detected Not Detected    Parainfluenza 1 PCR Not Detected Not Detected    Parainfluenza 2 PCR Not Detected Not Detected    Parainfluenza 3 PCR Not Detected Not Detected    Parainfluenza 4 PCR Not Detected Not Detected    Resp Syncytial Virus PCR Not Detected Not Detected    Bordetella Parapertussis Not Detected Not Detected    B Pertussis by PCR Not Detected Not Detected    Chlamydia pneumoniae By PCR Not Detected Not Detected    Mycoplasma pneumo by PCR Not Detected Not Detected   Culture, Respiratory    Specimen: Endotracheal   Result Value Ref Range    Specimen Description . ENDOTRACHEAL     Direct Exam < 10 EPITHELIAL CELLS/LPF     Direct Exam >25 NEUTROPHILS/LPF     Direct Exam RARE GRAM POSITIVE COCCI IN CLUSTERS (A)     Culture (A)      METHICILLIN RESISTANT STAPHYLOCOCCUS AUREUS LIGHT GROWTH   MRSA DNA Probe, Nasal    Specimen: Nasal   Result Value Ref Range    Specimen Description . NASAL SWAB     MRSA, DNA, Nasal (A) NEGATIVE     POSITIVE:  MRSA DNA detected by nucleic acid amplification. Culture, Blood 1    Specimen: Blood   Result Value Ref Range    Specimen Description . BLOOD     Special Requests L HAND 5ML     Culture NO GROWTH 12 HOURS    Culture, Blood 1    Specimen: Blood   Result Value Ref Range    Specimen Description . BLOOD     Special Requests L HAND 5ML     Culture NO GROWTH 12 HOURS    CBC with Auto Differential   Result Value Ref Range    WBC 10.1 3.5 - 11.3 k/uL    RBC 3.90 (L) 3.95 - 5.11 m/uL    Hemoglobin 10.5 (L) 11.9 - 15.1 g/dL    Hematocrit 35.7 (L) 36.3 - 47.1 %    MCV 91.5 82.6 - 102.9 fL    MCH 26.9 25.2 - 33.5 pg    MCHC 29.4 28.4 - 34.8 g/dL    RDW 14.7 (H) 11.8 - 14.4 %    Platelets 562 112 - 897 k/uL    MPV 10.8 8.1 - 13.5 fL    NRBC Automated 0.0 0.0 per 100 WBC    Seg Neutrophils 85 (H) 36 - 65 %    Lymphocytes 8 (L) 24 - 43 %    Monocytes 6 3 - 12 %    Eosinophils % 0 (L) 1 - 4 %    Basophils 0 0 - 2 %    Immature Granulocytes 1 (H) 0 %    Segs Absolute 8.56 (H) 1.50 - 8.10 k/uL    Absolute Lymph # 0.79 (L) 1.10 - 3.70 k/uL    Absolute Mono # 0.63 0.10 - 1.20 k/uL Absolute Eos # <0.03 0.00 - 0.44 k/uL    Basophils Absolute <0.03 0.00 - 0.20 k/uL    Absolute Immature Granulocyte 0.09 0.00 - 0.30 k/uL    RBC Morphology ANISOCYTOSIS PRESENT    Basic Metabolic Panel w/ Reflex to MG   Result Value Ref Range    Glucose 180 (H) 70 - 99 mg/dL    BUN 16 6 - 20 mg/dL    CREATININE 0.87 0.50 - 0.90 mg/dL    Calcium 9.2 8.6 - 10.4 mg/dL    Sodium 139 135 - 144 mmol/L    Potassium 3.8 3.7 - 5.3 mmol/L    Chloride 94 (L) 98 - 107 mmol/L    CO2 30 20 - 31 mmol/L    Anion Gap 15 9 - 17 mmol/L    GFR Non-African American >60 >60 mL/min    GFR African American >60 >60 mL/min    GFR Comment         Troponin   Result Value Ref Range    Troponin, High Sensitivity 12 0 - 14 ng/L   Brain Natriuretic Peptide   Result Value Ref Range    Pro-BNP 4,082 (H) <300 pg/mL   Troponin   Result Value Ref Range    Troponin, High Sensitivity 13 0 - 14 ng/L   ELECTROLYTES PLUS   Result Value Ref Range    POC Sodium 141 138 - 146 mmol/L    POC Potassium 3.6 3.5 - 4.5 mmol/L    POC Chloride 100 98 - 107 mmol/L    POC TCO2 35 (H) 22 - 30 mmol/L    Anion Gap 7 7 - 16 mmol/L   Hemoglobin and hematocrit, blood   Result Value Ref Range    POC Hemoglobin 14.0 12.0 - 16.0 g/dL    POC Hematocrit 41 36 - 46 %   CALCIUM, IONIC (POC)   Result Value Ref Range    POC Ionized Calcium 1.12 (L) 1.15 - 1.33 mmol/L   Hemoglobin A1C   Result Value Ref Range    Hemoglobin A1C 6.8 (H) 4.0 - 6.0 %    Estimated Avg Glucose 148 mg/dL   Procalcitonin   Result Value Ref Range    Procalcitonin 0.19 (H) <0.09 ng/mL   BLOOD GAS, VENOUS   Result Value Ref Range    pH, Zaid 7.316 (L) 7.320 - 7.420    pCO2, Zaid 70.2 (H) 39 - 55    pO2, Zaid 34.8 30 - 50    HCO3, Venous 34.8 (H) 24 - 30 mmol/L    Positive Base Excess, Zaid 7.3 (H) 0.0 - 2.0 mmol/L    O2 Sat, Zaid 59.1 (L) 60.0 - 85.0 %    Carboxyhemoglobin 1.4 0 - 5 %    Pt Temp 37.0     FIO2 UNKNOWN    CBC with Auto Differential   Result Value Ref Range    WBC 11.8 (H) 3.5 - 11.3 k/uL    RBC 3.97 3.95 - 5.11 m/uL    Hemoglobin 10.7 (L) 11.9 - 15.1 g/dL    Hematocrit 36.5 36.3 - 47.1 %    MCV 91.9 82.6 - 102.9 fL    MCH 27.0 25.2 - 33.5 pg    MCHC 29.3 28.4 - 34.8 g/dL    RDW 14.6 (H) 11.8 - 14.4 %    Platelets 866 131 - 960 k/uL    MPV 11.3 8.1 - 13.5 fL    NRBC Automated 0.0 0.0 per 100 WBC    Immature Granulocytes 4 (H) 0 %    Seg Neutrophils 87 (H) 36 - 66 %    Lymphocytes 6 (L) 24 - 44 %    Monocytes 3 1 - 7 %    Eosinophils % 0 (L) 1 - 4 %    Basophils 0 0 - 2 %    Absolute Immature Granulocyte 0.47 (H) 0.00 - 0.30 k/uL    Segs Absolute 10.27 (H) 1.8 - 7.7 k/uL    Absolute Lymph # 0.71 (L) 1.0 - 4.8 k/uL    Absolute Mono # 0.35 0.1 - 0.8 k/uL    Absolute Eos # 0.00 0.0 - 0.4 k/uL    Basophils Absolute 0.00 0.0 - 0.2 k/uL    Morphology ANISOCYTOSIS PRESENT    SPECIMEN REJECTION   Result Value Ref Range    Specimen Source . BLOOD     Ordered Test CDP, BMPX     Reason for Rejection Unable to perform testing: Specimen clotted. SPECIMEN REJECTION   Result Value Ref Range    Specimen Source . BLOOD     Ordered Test BMPX     Reason for Rejection Unable to perform testing: Specimen hemolyzed.     Basic Metabolic Panel w/ Reflex to MG   Result Value Ref Range    Glucose 241 (H) 70 - 99 mg/dL    BUN 28 (H) 6 - 20 mg/dL    CREATININE 1.09 (H) 0.50 - 0.90 mg/dL    Calcium 8.6 8.6 - 10.4 mg/dL    Sodium 139 135 - 144 mmol/L    Potassium 4.0 3.7 - 5.3 mmol/L    Chloride 95 (L) 98 - 107 mmol/L    CO2 32 (H) 20 - 31 mmol/L    Anion Gap 12 9 - 17 mmol/L    GFR Non-African American 54 (L) >60 mL/min    GFR African American >60 >60 mL/min    GFR Comment         Basic Metabolic Panel w/ Reflex to MG   Result Value Ref Range    Glucose 192 (H) 70 - 99 mg/dL    BUN 36 (H) 6 - 20 mg/dL    CREATININE 1.05 (H) 0.50 - 0.90 mg/dL    Calcium 8.6 8.6 - 10.4 mg/dL    Sodium 138 135 - 144 mmol/L    Potassium 3.9 3.7 - 5.3 mmol/L    Chloride 96 (L) 98 - 107 mmol/L    CO2 30 20 - 31 mmol/L    Anion Gap 12 9 - 17 mmol/L    GFR Non- 56 (L) >60 mL/min    GFR African American >60 >60 mL/min    GFR Comment         CBC with Auto Differential   Result Value Ref Range    WBC 10.7 3.5 - 11.3 k/uL    RBC 3.61 (L) 3.95 - 5.11 m/uL    Hemoglobin 9.8 (L) 11.9 - 15.1 g/dL    Hematocrit 32.8 (L) 36.3 - 47.1 %    MCV 90.9 82.6 - 102.9 fL    MCH 27.1 25.2 - 33.5 pg    MCHC 29.9 28.4 - 34.8 g/dL    RDW 14.4 11.8 - 14.4 %    Platelets 445 844 - 056 k/uL    MPV 11.0 8.1 - 13.5 fL    NRBC Automated 0.0 0.0 per 100 WBC    Seg Neutrophils 83 (H) 36 - 65 %    Lymphocytes 9 (L) 24 - 43 %    Monocytes 7 3 - 12 %    Eosinophils % 0 (L) 1 - 4 %    Basophils 0 0 - 2 %    Immature Granulocytes 1 (H) 0 %    Segs Absolute 8.85 (H) 1.50 - 8.10 k/uL    Absolute Lymph # 0.98 (L) 1.10 - 3.70 k/uL    Absolute Mono # 0.74 0.10 - 1.20 k/uL    Absolute Eos # <0.03 0.00 - 0.44 k/uL    Basophils Absolute <0.03 0.00 - 0.20 k/uL    Absolute Immature Granulocyte 0.06 0.00 - 0.30 k/uL   Basic Metabolic Panel w/ Reflex to MG   Result Value Ref Range    Glucose 187 (H) 70 - 99 mg/dL    BUN 39 (H) 6 - 20 mg/dL    CREATININE 0.98 (H) 0.50 - 0.90 mg/dL    Calcium 8.8 8.6 - 10.4 mg/dL    Sodium 139 135 - 144 mmol/L    Potassium 4.0 3.7 - 5.3 mmol/L    Chloride 95 (L) 98 - 107 mmol/L    CO2 32 (H) 20 - 31 mmol/L    Anion Gap 12 9 - 17 mmol/L    GFR Non-African American >60 >60 mL/min    GFR African American >60 >60 mL/min    GFR Comment         CBC with Auto Differential   Result Value Ref Range    WBC 11.6 (H) 3.5 - 11.3 k/uL    RBC 4.05 3.95 - 5.11 m/uL    Hemoglobin 10.7 (L) 11.9 - 15.1 g/dL    Hematocrit 36.7 36.3 - 47.1 %    MCV 90.6 82.6 - 102.9 fL    MCH 26.4 25.2 - 33.5 pg    MCHC 29.2 28.4 - 34.8 g/dL    RDW 14.5 (H) 11.8 - 14.4 %    Platelets 427 232 - 689 k/uL    MPV 11.1 8.1 - 13.5 fL    NRBC Automated 0.0 0.0 per 100 WBC    Seg Neutrophils 82 (H) 36 - 65 %    Lymphocytes 10 (L) 24 - 43 %    Monocytes 7 3 - 12 %    Eosinophils % 0 (L) 1 - 4 %    Basophils 0 0 - 2 %    Immature Granulocytes 1 (H) 0 %    Segs Absolute 9.45 (H) 1.50 - 8.10 k/uL    Absolute Lymph # 1.16 1.10 - 3.70 k/uL    Absolute Mono # 0.83 0.10 - 1.20 k/uL    Absolute Eos # <0.03 0.00 - 0.44 k/uL    Basophils Absolute <0.03 0.00 - 0.20 k/uL    Absolute Immature Granulocyte 0.10 0.00 - 0.30 k/uL    RBC Morphology ANISOCYTOSIS PRESENT    CBC with Auto Differential   Result Value Ref Range    WBC 9.2 3.5 - 11.3 k/uL    RBC 4.45 3.95 - 5.11 m/uL    Hemoglobin 11.8 (L) 11.9 - 15.1 g/dL    Hematocrit 39.1 36.3 - 47.1 %    MCV 87.9 82.6 - 102.9 fL    MCH 26.5 25.2 - 33.5 pg    MCHC 30.2 28.4 - 34.8 g/dL    RDW 14.3 11.8 - 14.4 %    Platelets See Reflexed IPF Result 138 - 453 k/uL    NRBC Automated 0.0 0.0 per 100 WBC    Seg Neutrophils 79 (H) 36 - 65 %    Lymphocytes 15 (L) 24 - 43 %    Monocytes 5 3 - 12 %    Eosinophils % 0 (L) 1 - 4 %    Basophils 0 0 - 2 %    Immature Granulocytes 1 (H) 0 %    Segs Absolute 7.30 1.50 - 8.10 k/uL    Absolute Lymph # 1.33 1.10 - 3.70 k/uL    Absolute Mono # 0.46 0.10 - 1.20 k/uL    Absolute Eos # 0.03 0.00 - 0.44 k/uL    Basophils Absolute <0.03 0.00 - 0.20 k/uL    Absolute Immature Granulocyte 0.06 0.00 - 0.30 k/uL   Immature Platelet Fraction   Result Value Ref Range    Platelet, Fluorescence Platelet clumps present, count appears decreased.  138 - 453 k/uL   Urinalysis with Reflex to Culture    Specimen: Urine   Result Value Ref Range    Color, UA Yellow Yellow    Turbidity UA Clear Clear    Glucose, Ur NEGATIVE NEGATIVE    Bilirubin Urine NEGATIVE NEGATIVE    Ketones, Urine NEGATIVE NEGATIVE    Specific Gravity, UA 1.018 1.005 - 1.030    Urine Hgb NEGATIVE NEGATIVE    pH, UA 6.0 5.0 - 8.0    Protein, UA 2+ (A) NEGATIVE    Urobilinogen, Urine Normal Normal    Nitrite, Urine NEGATIVE NEGATIVE    Leukocyte Esterase, Urine NEGATIVE NEGATIVE   Microscopic Urinalysis   Result Value Ref Range    -          WBC, UA None 0 - 5 /HPF    RBC, UA 2 TO 5 0 - 4 /HPF    Casts UA  0 - 8 /LPF 0 TO 2 HYALINE Reference range defined for non-centrifuged specimen.     Epithelial Cells UA 2 TO 5 0 - 5 /HPF   Basic Metabolic Panel w/ Reflex to MG   Result Value Ref Range    Glucose 245 (H) 70 - 99 mg/dL    BUN 40 (H) 6 - 20 mg/dL    CREATININE 1.44 (H) 0.50 - 0.90 mg/dL    Calcium 8.9 8.6 - 10.4 mg/dL    Sodium 137 135 - 144 mmol/L    Potassium 3.5 (L) 3.7 - 5.3 mmol/L    Chloride 91 (L) 98 - 107 mmol/L    CO2 27 20 - 31 mmol/L    Anion Gap 19 (H) 9 - 17 mmol/L    GFR Non-African American 39 (L) >60 mL/min    GFR  47 (L) >60 mL/min    GFR Comment         CBC with Auto Differential   Result Value Ref Range    WBC 26.1 (H) 3.5 - 11.3 k/uL    RBC 4.57 3.95 - 5.11 m/uL    Hemoglobin 12.4 11.9 - 15.1 g/dL    Hematocrit 40.0 36.3 - 47.1 %    MCV 87.5 82.6 - 102.9 fL    MCH 27.1 25.2 - 33.5 pg    MCHC 31.0 28.4 - 34.8 g/dL    RDW 14.5 (H) 11.8 - 14.4 %    Platelets 404 498 - 200 k/uL    MPV 11.3 8.1 - 13.5 fL    NRBC Automated 0.0 0.0 per 100 WBC    Immature Granulocytes 1 (H) 0 %    Seg Neutrophils 87 (H) 36 - 66 %    Lymphocytes 5 (L) 24 - 44 %    Monocytes 7 1 - 7 %    Eosinophils % 0 (L) 1 - 4 %    Basophils 0 0 - 2 %    Absolute Immature Granulocyte 0.26 0.00 - 0.30 k/uL    Segs Absolute 22.70 (H) 1.8 - 7.7 k/uL    Absolute Lymph # 1.31 1.0 - 4.8 k/uL    Absolute Mono # 1.83 (H) 0.1 - 0.8 k/uL    Absolute Eos # 0.00 0.0 - 0.4 k/uL    Basophils Absolute 0.00 0.0 - 0.2 k/uL    Morphology Normal    Methemoglobin   Result Value Ref Range    Methemoglobin 1.1 0.0 - 1.5 %   Methemoglobin   Result Value Ref Range    Methemoglobin 0.5 0.0 - 1.5 %   Magnesium   Result Value Ref Range    Magnesium 1.7 1.6 - 2.6 mg/dL   C-Reactive Protein   Result Value Ref Range    .9 (H) 0.0 - 5.0 mg/L   Procalcitonin   Result Value Ref Range    Procalcitonin 14.67 (H) <0.09 ng/mL   Basic Metabolic Panel w/ Reflex to MG   Result Value Ref Range    Glucose 199 (H) 70 - 99 mg/dL    BUN 63 (H) 6 - 20 mg/dL CREATININE 2.35 (H) 0.50 - 0.90 mg/dL    Calcium 8.7 8.6 - 10.4 mg/dL    Sodium 140 135 - 144 mmol/L    Potassium 4.8 3.7 - 5.3 mmol/L    Chloride 94 (L) 98 - 107 mmol/L    CO2 21 20 - 31 mmol/L    Anion Gap 25 (H) 9 - 17 mmol/L    GFR Non-African American 22 (L) >60 mL/min    GFR  27 (L) >60 mL/min    GFR Comment         CBC with Auto Differential   Result Value Ref Range    WBC 25.2 (H) 3.5 - 11.3 k/uL    RBC 4.64 3.95 - 5.11 m/uL    Hemoglobin 12.4 11.9 - 15.1 g/dL    Hematocrit 40.7 36.3 - 47.1 %    MCV 87.7 82.6 - 102.9 fL    MCH 26.7 25.2 - 33.5 pg    MCHC 30.5 28.4 - 34.8 g/dL    RDW 14.5 (H) 11.8 - 14.4 %    Platelets 596 108 - 055 k/uL    MPV 12.2 8.1 - 13.5 fL    NRBC Automated 0.4 (H) 0.0 per 100 WBC    Immature Granulocytes 2 (H) 0 %    Seg Neutrophils 86 (H) 36 - 65 %    Lymphocytes 5 (L) 24 - 43 %    Monocytes 7 3 - 12 %    Eosinophils % 0 (L) 1 - 4 %    Basophils 0 0 - 2 %    Absolute Immature Granulocyte 0.50 (H) 0.00 - 0.30 k/uL    Segs Absolute 21.68 (H) 1.50 - 8.10 k/uL    Absolute Lymph # 1.26 1.10 - 3.70 k/uL    Absolute Mono # 1.76 (H) 0.10 - 1.20 k/uL    Absolute Eos # 0.00 0.00 - 0.44 k/uL    Basophils Absolute 0.00 0.00 - 0.20 k/uL    Morphology ANISOCYTOSIS PRESENT    Methemoglobin   Result Value Ref Range    Methemoglobin 0.6 0.0 - 1.5 %   Triglyceride   Result Value Ref Range    Triglycerides 128 <150 mg/dL   Lactic Acid   Result Value Ref Range    Lactic Acid, Whole Blood 4.7 (H) 0.7 - 2.1 mmol/L   Ferritin   Result Value Ref Range    Ferritin 1,719 (H) 13 - 150 ng/mL   Fibrinogen   Result Value Ref Range    Fibrinogen 608 (H) 140 - 420 mg/dL   Ferritin   Result Value Ref Range    Ferritin 1,689 (H) 13 - 150 ng/mL   Hepatic Function Panel   Result Value Ref Range    Albumin 2.9 (L) 3.5 - 5.2 g/dL    Alkaline Phosphatase 142 (H) 35 - 104 U/L     (H) 5 - 33 U/L     (H) <32 U/L    Total Bilirubin 0.89 0.3 - 1.2 mg/dL    Bilirubin, Direct 0.54 (H) <0.31 mg/dL Bilirubin, Indirect 0.35 0.00 - 1.00 mg/dL    Total Protein 7.1 6.4 - 8.3 g/dL    Albumin/Globulin Ratio 0.7 (L) 1.0 - 2.5   Venous Blood Gas, POC   Result Value Ref Range    pH, Zaid 7.361 7.320 - 7.430    pCO2, Zaid 60.6 (H) 41.0 - 51.0 mm Hg    pO2, Zaid 34.8 30.0 - 50.0 mm Hg    HCO3, Venous 34.3 (H) 22.0 - 29.0 mmol/L    Positive Base Excess, Zaid 7 (H) 0.0 - 3.0    O2 Sat, Zaid 62 60.0 - 85.0 %   Creatinine W/GFR Point of Care   Result Value Ref Range    POC Creatinine 0.89 0.51 - 1.19 mg/dL    GFR Comment >60 >60 mL/min    GFR Non-African American >60 >60 mL/min    GFR Comment         POCT urea (BUN)   Result Value Ref Range    POC BUN 16 8 - 26 mg/dL   Lactic Acid, POC   Result Value Ref Range    POC Lactic Acid 1.08 0.56 - 1.39 mmol/L   POCT Glucose   Result Value Ref Range    POC Glucose 191 (H) 74 - 100 mg/dL   POC Glucose Fingerstick   Result Value Ref Range    POC Glucose 199 (H) 65 - 105 mg/dL   POC Glucose Fingerstick   Result Value Ref Range    POC Glucose 211 (H) 65 - 105 mg/dL   POC Glucose Fingerstick   Result Value Ref Range    POC Glucose 198 (H) 65 - 105 mg/dL   POC Glucose Fingerstick   Result Value Ref Range    POC Glucose 220 (H) 65 - 105 mg/dL   POC Glucose Fingerstick   Result Value Ref Range    POC Glucose 204 (H) 65 - 105 mg/dL   POC Glucose Fingerstick   Result Value Ref Range    POC Glucose 179 (H) 65 - 105 mg/dL   POC Glucose Fingerstick   Result Value Ref Range    POC Glucose 240 (H) 65 - 105 mg/dL   POC Glucose Fingerstick   Result Value Ref Range    POC Glucose 205 (H) 65 - 105 mg/dL   POC Glucose Fingerstick   Result Value Ref Range    POC Glucose 175 (H) 65 - 105 mg/dL   POC Glucose Fingerstick   Result Value Ref Range    POC Glucose 180 (H) 65 - 105 mg/dL   POC Glucose Fingerstick   Result Value Ref Range    POC Glucose 226 (H) 65 - 105 mg/dL   POC Glucose Fingerstick   Result Value Ref Range    POC Glucose 228 (H) 65 - 105 mg/dL   POC Glucose Fingerstick   Result Value Ref Range POC Glucose 143 (H) 65 - 105 mg/dL   POC Glucose Fingerstick   Result Value Ref Range    POC Glucose 211 (H) 65 - 105 mg/dL   POC Glucose Fingerstick   Result Value Ref Range    POC Glucose 170 (H) 65 - 105 mg/dL   POC Glucose Fingerstick   Result Value Ref Range    POC Glucose 185 (H) 65 - 105 mg/dL   POC Glucose Fingerstick   Result Value Ref Range    POC Glucose 229 (H) 65 - 105 mg/dL   Arterial Blood Gas, POC   Result Value Ref Range    POC pH 7.602 (HH) 7.350 - 7.450    POC pCO2 34.8 (L) 35.0 - 48.0 mm Hg    POC PO2 58.3 (L) 83.0 - 108.0 mm Hg    POC HCO3 34.4 (H) 21.0 - 28.0 mmol/L    Positive Base Excess, Art 12 (H) 0.0 - 3.0    POC O2 SAT 94 94.0 - 98.0 %   Notification Panel, POC   Result Value Ref Range    Action NotifyRN     NOTIFY Rosi     READ BACK Yes     Date/Time 06/12/202217:29:00    POC Glucose Fingerstick   Result Value Ref Range    POC Glucose 227 (H) 65 - 105 mg/dL   Arterial Blood Gas, POC   Result Value Ref Range    POC pH 7.516 (H) 7.350 - 7.450    POC pCO2 43.5 35.0 - 48.0 mm Hg    POC PO2 59.5 (L) 83.0 - 108.0 mm Hg    POC HCO3 35.2 (H) 21.0 - 28.0 mmol/L    Positive Base Excess, Art 11 (H) 0.0 - 3.0    POC O2 SAT 93 (L) 94.0 - 98.0 %    O2 Device/Flow/% Adult Ventilator     Sample Site Arterial Line     Mode PRVC     FIO2 80.0    POCT Glucose   Result Value Ref Range    POC Glucose 232 (H) 74 - 100 mg/dL   Arterial Blood Gas, POC   Result Value Ref Range    POC pH 7.370 7.350 - 7.450    POC pCO2 52.3 (H) 35.0 - 48.0 mm Hg    POC PO2 53.5 (L) 83.0 - 108.0 mm Hg    POC HCO3 30.3 (H) 21.0 - 28.0 mmol/L    Positive Base Excess, Art 4 (H) 0.0 - 3.0    POC O2 SAT 86 (L) 94.0 - 98.0 %    O2 Device/Flow/% Adult Ventilator     Sample Site Arterial Line     Mode PRVC     FIO2 100.0     Pt Temp 38.5     POC pH Temp 7.35     POC pCO2 Temp 56 mm Hg    POC pO2 Temp 59 mm Hg   POCT Glucose   Result Value Ref Range    POC Glucose 267 (H) 74 - 100 mg/dL   Arterial Blood Gas, POC   Result Value Ref Range POC pH 7.382 7.350 - 7.450    POC pCO2 51.3 (H) 35.0 - 48.0 mm Hg    POC PO2 46.0 (LL) 83.0 - 108.0 mm Hg    POC HCO3 30.5 (H) 21.0 - 28.0 mmol/L    Positive Base Excess, Art 4 (H) 0.0 - 3.0    POC O2 SAT 80 (L) 94.0 - 98.0 %    O2 Device/Flow/% Adult Ventilator     Sample Site Arterial Line     Mode PRVC     FIO2 100.0    POCT Glucose   Result Value Ref Range    POC Glucose 230 (H) 74 - 100 mg/dL   Arterial Blood Gas, POC   Result Value Ref Range    POC pH 7.404 7.350 - 7.450    POC pCO2 45.8 35.0 - 48.0 mm Hg    POC PO2 61.5 (L) 83.0 - 108.0 mm Hg    POC HCO3 28.6 (H) 21.0 - 28.0 mmol/L    Positive Base Excess, Art 3 0.0 - 3.0    POC O2 SAT 91 (L) 94.0 - 98.0 %    O2 Device/Flow/% Adult Ventilator     Sample Site Arterial Line     Mode PRVC     FIO2 100.0    POCT Glucose   Result Value Ref Range    POC Glucose 215 (H) 74 - 100 mg/dL   POC Glucose Fingerstick   Result Value Ref Range    POC Glucose 224 (H) 65 - 105 mg/dL   POC Glucose Fingerstick   Result Value Ref Range    POC Glucose 252 (H) 65 - 105 mg/dL   POC Glucose Fingerstick   Result Value Ref Range    POC Glucose 260 (H) 65 - 105 mg/dL   POC Glucose Fingerstick   Result Value Ref Range    POC Glucose 253 (H) 65 - 105 mg/dL   POC Glucose Fingerstick   Result Value Ref Range    POC Glucose 207 (H) 65 - 105 mg/dL   POC Glucose Fingerstick   Result Value Ref Range    POC Glucose 185 (H) 65 - 105 mg/dL   POC Glucose Fingerstick   Result Value Ref Range    POC Glucose 177 (H) 65 - 105 mg/dL   Arterial Blood Gas, POC   Result Value Ref Range    POC pH 7.394 7.350 - 7.450    POC pCO2 45.6 35.0 - 48.0 mm Hg    POC PO2 79.2 (L) 83.0 - 108.0 mm Hg    POC HCO3 27.8 21.0 - 28.0 mmol/L    Positive Base Excess, Art 2 0.0 - 3.0    POC O2 SAT 95 94.0 - 98.0 %    O2 Device/Flow/% Adult Ventilator     Sample Site Arterial Line     FIO2 100.0    Arterial Blood Gas, POC   Result Value Ref Range    POC pH 7.387 7.350 - 7.450    POC pCO2 49.6 (H) 35.0 - 48.0 mm Hg    POC PO2 69.3 (L) 83.0 - 108.0 mm Hg    POC HCO3 29.8 (H) 21.0 - 28.0 mmol/L    Positive Base Excess, Art 4 (H) 0.0 - 3.0    POC O2 SAT 93 (L) 94.0 - 98.0 %    O2 Device/Flow/% Adult Ventilator     Aubrey Test NOT APPLICABLE     Sample Site Arterial Line     FIO2 100.0     Pt Temp 38.2     POC pH Temp 7.37     POC pCO2 Temp 52 mm Hg    POC pO2 Temp 75 mm Hg   POC Glucose Fingerstick   Result Value Ref Range    POC Glucose 229 (H) 65 - 105 mg/dL   POC Glucose Fingerstick   Result Value Ref Range    POC Glucose 239 (H) 65 - 105 mg/dL   POC Glucose Fingerstick   Result Value Ref Range    POC Glucose 254 (H) 65 - 105 mg/dL   POC Glucose Fingerstick   Result Value Ref Range    POC Glucose 304 (H) 65 - 105 mg/dL   POC Glucose Fingerstick   Result Value Ref Range    POC Glucose 260 (H) 65 - 105 mg/dL   EKG 12 Lead   Result Value Ref Range    Ventricular Rate 99 BPM    Atrial Rate 99 BPM    P-R Interval 148 ms    QRS Duration 84 ms    Q-T Interval 360 ms    QTc Calculation (Bazett) 462 ms    P Axis 46 degrees    R Axis 53 degrees    T Axis -17 degrees     Radiology/Imaging:  XR CHEST (SINGLE VIEW FRONTAL)   Final Result   Support lines and tubes appear satisfactory in positioning. Improved diffuse bilateral airspace disease. Increased right pleural effusion. Unchanged small left effusion. XR CHEST PORTABLE   Final Result   1. No pneumothorax      2. Diffuse bilateral airspace opacities      3. Small left pleural effusion         XR CHEST PORTABLE   Final Result   There is increasing bilateral airspace disease. Posteriorly layering pleural   fluid may contribute to the increasing opacity. No pneumothorax. Tubes and lines remain in good position. XR ABDOMEN FOR NG/OG/NE TUBE PLACEMENT   Final Result   1. Satisfactory position of support devices. Stable multifocal   consolidation could represent edema or pneumonia. 2. Enteric tube terminates in the gastric body. No bowel obstruction. XR CHEST PORTABLE   Final Result   1. Satisfactory position of support devices. Stable multifocal   consolidation could represent edema or pneumonia. 2. Enteric tube terminates in the gastric body. No bowel obstruction. XR CHEST PORTABLE   Final Result   Successful intubation with the endotracheal tube 3.7 cm above the lore. Perihilar edema/infiltrates suggestive of ARDS. VL DUP LOWER EXTREMITY VENOUS BILATERAL   Final Result      XR CHEST PORTABLE   Final Result   Cardiomegaly mild vascular congestion similar to prior.            ASSESSMENT:     Patient Active Problem List    Diagnosis Date Noted    COVID-19 virus infection 06/08/2022    Acute on chronic diastolic congestive heart failure ([address] 06/08/2022    Respiratory distress     Diarrhea 04/19/2022    Cor pulmonale, chronic ([address] 04/17/2022    CHF (congestive heart failure), NYHA class I, acute on chronic, combined ([address] 04/08/2022    Prediabetes 03/19/2022    Hyperlipidemia 03/19/2022    Hypokalemia 03/19/2022    Hypomagnesemia 03/19/2022    Dyspnea 03/18/2022    Type 2 diabetes mellitus ([address] 05/06/2019    Dizziness 05/06/2019    Normocytic anemia 05/06/2019    Acute on chronic congestive heart failure ([address]     Right heart failure, unspecified ([address] 02/22/2019    Acute on chronic diastolic heart failure (HCC) 02/22/2019    Muscle spasm 11/19/2018    Chronic narcotic dependence ([address] 11/16/2018    Chronically on benzodiazepine therapy 11/16/2018    Neuropathy 11/16/2018    Epigastric pain 11/16/2018    Chronic respiratory failure ([address] 09/16/2018    Pulmonary hypertension, moderate to severe ([address] 06/09/2018    Morbid obesity with BMI of 50.0-59.9, adult ([address]     Obesity hypoventilation syndrome ([address]     H/O tracheostomy     STEPH (obstructive sleep apnea)     Chest pain 06/08/2018    Acute non-recurrent pansinusitis     Status post tracheostomy ([address] 02/17/2018    Status post [facility] tube [facility] 02/17/2018    Rhinovirus infection     Goals of care, counseling/discussion     Fever     Disease due to rhinovirus     ARDS (adult respiratory distress syndrome) ([facility] 02/02/2018    COPD exacerbation ([facility]     NSTEMI (non-ST elevated myocardial infarction) ([facility] 01/27/2018    Acute pulmonary edema ([facility] 01/27/2018    Hypertensive emergency 01/27/2018    Acute respiratory failure with hypoxia and hypercapnia ([facility] 01/27/2018    Smoker 01/27/2018    Morbid obesity ([facility] 01/27/2018    SOB (shortness of breath) 01/27/2018    Pneumonia of both lower lobes due to infectious organism 01/26/2018    Asthma 09/21/2014    Pneumonia 09/21/2014    HTN (hypertension) 09/21/2014    Torn meniscus 09/21/2014      PLAN:     59-year-old female with acute on chronic hypercapnic hypoxic respiratory failure requiring max BiPAP with superimposed COVID, CHF and COPD, severe pulmonary hypertension. Intubated on 6/12 for decreasing oxygen saturation. ARDS protocol, nitric oxide started. ECMO team is consulted but declined patient. Concern for superimposed PNA with elevated CRP and procal. Started meropenem, linezolid, remdesivir. Resp cultures grew MRSA, susceptibility pending. Blood cultures drawn. Morning labs not drawn yet. PLAN/MEDICAL DECISION MAKING:  Neurologic:   Patient is intubated   Sedation: versed, fentanyl    Cardiovascular:  · Echo ordered: EF 65% with RVSP 97mmHg: not ECMO candidate  · COVID-induced respiratory failure with possible superimposed PNA causing sepsis and mild shock  · CHF  · Norepinephrine is off 6/13  · Nitroglycerin as needed for chest pain  · Bumetanide 1mg bid > increased to home dose bumex 2mg bid > decreased 6/13 to 1mg bid  Pulmonary:  · Patient on ventilator  · Patient's baseline is \"60-80\" at home. Vent Information  Ventilator ID: [id number]  Vent Mode: PRVC  · Ventilator Initiate:  Yes   · Patient high 90s on 100% FiO2 Severe pulmonary hypertension  · Possible COPD exacerbation: duoneb, tessalon, mucinex ER held  · Patient on Rocephin, completed 5 days  · Started zosyn , switched to linezolid, meropenem on  with blood cx drawn x2, sputum cx growing MRSA  ·  CXR: right effusioncx    GI/Nutrition  · Pepcid for GI prophylaxis  · Diabetic feeds    Renal/Fluid/Electrolyte  · Patient on bumetanide  · 500ml bolus given for low UOP,  LR maintenance fluids started 100ml/h  · Continue to monitor urine output  · Replete electrolytes as needed, tbd, morning lab not drawn 6/15    ID  WBC:   Lab Results   Component Value Date    WBC 25.2 (H) 2022     Tmax: Temp (24hrs), Av.3 °F (38.5 °C), Min:100.2 °F (37.9 °C), Max:102.9 °F (39.4 °C)  ·   · Patient has been febrile, tachycardic, WBC 25.   · ID consulted for COVID worsening and superimposed infection, starting remdesivir, meropenem, linezolid     Hematology:  Recent Labs     22  0443 22  0739   HGB 12.4 12.4   ·  stable  Endocrine:   glucose controlled - most recent BGL is   Recent Labs     22  0443 22  0739   GLUCOSE 245* 199*   Medium ISS changed to high ISS    DVT Prophylaxis  · Patient is on heparin for DVT prophylaxis.   Discharge Needs:  PT, OT, ST, SW and Case Management      CODE STATUS: Full Code    DISPOSITION:  [x] To remain ICU  [] OK for out of ICU from Elias Williamson MD  TY Resident  06/15/22 7:44 AM

## 2022-06-15 NOTE — PROGRESS NOTES
Pt currently proned, head turned to the left, no skin breakdown noticed. ETT secured by guaze bandage per hospital protocol. Pt tolerated well. RN x5 assisted. Ventilator with full support settings functioning. EtCO2 and Vte present. Will continue to monitor.

## 2022-06-15 NOTE — PLAN OF CARE
Problem: Respiratory - Adult  Goal: Achieves optimal ventilation and oxygenation  6/15/2022 0836 by Doris Lu RCP  Outcome: Progressing  6/14/2022 1956 by Alberto Coreas RN  Outcome: Progressing     Problem: Skin/Tissue Integrity  Goal: Absence of new skin breakdown  Description: 1. Monitor for areas of redness and/or skin breakdown  2. Assess vascular access sites hourly  3. Every 4-6 hours minimum:  Change oxygen saturation probe site  4. Every 4-6 hours:  If on nasal continuous positive airway pressure, respiratory therapy assess nares and determine need for appliance change or resting period.   6/15/2022 0836 by Doris Lu RCP  Outcome: Progressing  6/14/2022 1956 by Alberto Coreas RN  Outcome: Progressing

## 2022-06-15 NOTE — FLOWSHEET NOTE
Attempted to reposition patient's head to face towards the right side while prone with RT. Due to patient's habitus, repositioning was unsuccessful. Head returned to face towards the left hand side. Arms continued to be repositioned q2h.

## 2022-06-16 NOTE — PROGRESS NOTES
[facility] -   Infectious diseases evaluation  admission date 6/8/2022    reason for consultation:   bandemia    Impression :   Current:  · covid +  · bilat pulm infiltrates- ARDS vs multifocal pneumonia staph aureus MRSA  · P edema  · PULM HYPERTENSION  · COPD  · resp failure and intubated 6/12  · Severe sepsis and mild shock 6/13  · bandemia 6/13  · procal / CRP elevated 14- 6/13    Other:  ·   Discussion / summary of stay / plan of care   · Pt very ill- declined for ECMO  · ARDS protocole - ECMO team on board  · SA pneumonia  - secretions yellow thick, before paralytics  · Severe sepsis a mild shock -  · Doubt covid pneumonia component  · procal 14 elevated   · Fever and leukocytosis since 6/13 but resp distress since admission, getting worse and initially refused intubation  ·   Recommendations     · remdesevir stop 6/15 due to elevated liver enz  · zyvox 6/14 for MRSA pneumonia  · meropenem 6/14- keep due to severity of the illness  · Liver enz still elevated shock liver ? · Very poor outcome - repeat BC due to fever  · Defer antifungals due to elevated liver enz  · Case disc w RN and family - poor outcome    Infection Control Recommendations   · Universal Precautions  · Droplet + Isolation      Antimicrobial Stewardship Recommendations   · Simplification of therapy  · Targeted therapy      History of Present Illness:   Initial history:  Jono Ramsey is a 55y.o.-year-old female obese with a history of severe pulmonary hypertension and usually saturates 88 to 92% at home, comes in with increasing shortness of breath and desaturated in the ER in the low 80s. Tested positive for COVID. Was described to be wheezing in the ER and was maintained on BiPAP.   Diuresed initially for concern of CHF exacerbation, 100% FIo2 bipap   CXR shows bilat pulm congestion getting worse - and pt has been on decadron and ceftriaxone since 6/10  Due to increase in WBC 26, ID called - pt has a LGF    Pt had been desaturating and only accepted intubation 6/12 -  Intubated and now desaturating despite ARDS protocole and ECMO team consulted. Sp yellow thick will be sent for cx  Obese and no cellulitis      Interval changes  6/16/2022   Patient Vitals for the past 8 hrs:   BP Temp Temp src Pulse Resp SpO2 Height   06/16/22 1600 115/70 (!) 101 °F (38.3 °C) Bladder (!) 111 27 (!) 73 %    06/16/22 1507    (!) 107 27 (!) 88 %    06/16/22 1400 118/76 100.3 °F (37.9 °C) Bladder (!) 107 27 (!) 81 %    06/16/22 1319      90 %    06/16/22 1253       5' (1.524 m)   06/16/22 1240    (!) 110 27 (!) 85 %    06/16/22 1226      (!) 60 %    06/16/22 1200  (!) 101.3 °F (38.5 °C) Bladder 99 27       Fever since 6/13, improved on 6/15  Refused for ECMO  On ARDS protocole  On FIO2  But saturating better today since proning  Nitrite oxide still on going  Pressors stopped  Sp small due to paralytics  CXR increased effusion but improved infil  procal worse but CRP improved much    6/16   desaturation in the 60s while proning,. Moved supine and sp still small due to paralytics  Fever back today 101  BP ok on midrine only  WBC 28 and no new + cx  CXR still diffuse p infilt  Still full code    Summary of relevant labs:  Labs:  WBC 11-9-26  creat 1.44  Ferritin 1719  Fibrinogen 608  procalc 14 - 15   - 198    Micro:  resp PCR is COVID +  BC neg  Sp cx MRSA    Imaging:  CXR increased R effusion, improved bilat airspace disease    CXR bilat increasing airspace disease 6/12/22  Lungs nearly completely opacified      I have personally reviewed the past medical history, past surgical history, medications, social history, and family history, and I haveupdated the database accordingly.       Allergies:   Bee venom, Beta adrenergic blockers, Sulfa antibiotics, Asa [aspirin], Aspirin, Beta adrenergic blockers, and Sulfa antibiotics     Review of Systems:     Review of Systems   Unable to perform ROS: Intubated       Physical Examination :       Physical Exam  Constitutional:       Appearance: She is obese. She is ill-appearing. HENT:      Head: Normocephalic and atraumatic. Nose: Nose normal.      Mouth/Throat:      Mouth: Mucous membranes are moist.   Eyes:      General: No scleral icterus. Cardiovascular:      Rate and Rhythm: Regular rhythm. Tachycardia present. Heart sounds: Normal heart sounds. No murmur heard. No friction rub. Pulmonary:      Breath sounds: No stridor. No rhonchi. Abdominal:      Palpations: There is no mass. Hernia: No hernia is present. Genitourinary:     Comments: Urine torie  Musculoskeletal:         General: No swelling or tenderness. Cervical back: Neck supple. No rigidity. Skin:     Coloration: Skin is not jaundiced or pale. Findings: No bruising or erythema.    Neurological:      Comments: intubated   Psychiatric:      Comments: intubated          Past Medical History:     Past Medical History:   Diagnosis Date    Acute on chronic diastolic CHF (congestive heart failure) ([facility] 09/15/2018    HIEN (acute kidney injury) ([facility] [facility] 05/06/2019    HIEN (acute kidney injury) ([facility] [address] 11/20/2018    Asthma     CHF     Chronic obstructive lung disease (HCC)     Chronic respiratory failure with hypoxia (HCC)     on home O2 therapy    COPD     COVID-19 virus infection 6/8/2022    Diabetes mellitus, new onset ([facility] [facility] 05/06/2019    Former smoker     quit smoking about 17 months ago/ She has a 22.00 pack-year smoking history    HTN     Hyperglycemia     Hyperlipidemia     Hypertension     Morbid obesity with BMI of 60.0-69.9, adult ([facility] [facility]     Neuromuscular disorder (HCC)     Neuropathy Right hand    STEPH on CPAP     DME per Dr. Jaciel Diaz for CPAP of 18 cmh2o    Oxygen dependent     DME 06/2018 for home oxgyen at 2.5-3 lpm ATC    Pedal edema     Pneumonia     Pulmonary hypertension, moderate to severe ([address] 06/09/2018    Torn meniscus     Wears glasses        Past Surgical  History:     Past Surgical History:   Procedure Laterality Date    ABDOMEN SURGERY      Patient had a PEG tube placed 2018, removed 2018    [address]    CYSTOSCOPY  2019    CYSTOSCOPY N/A 2019    CYSTOSCOPY performed by Miller Fin[facility] at [facility]  2018    Removed in 2018    [facility] CATH POWER PICC TRIPLE  2018         JOINT REPLACEMENT      [place] OFFICE/OUTPT VISIT,PROCEDURE ONLY N/A 2018    TRACHEOTOMY performed by Sierra Hameed MD at [address]  2018    TRACHEOTOMY  2018       Medications:      insulin glargine  30 Units SubCUTAneous QAM    artificial tears   Both Eyes BID    budesonide  0.5 mg Nebulization BID    linezolid  600 mg IntraVENous Q12H    meropenem  1,000 mg IntraVENous Q12H    insulin lispro  0-18 Units SubCUTAneous Q4H    midodrine  5 mg Oral TID WC    [Held by provider] bumetanide  1 mg IntraVENous BID    famotidine  20 mg Oral Daily    gabapentin  400 mg Oral BID    [Held by provider] docusate sodium  100 mg Oral Daily    cetirizine  10 mg Oral Daily    fluticasone  1 spray Nasal Daily    sodium chloride flush  5-40 mL IntraVENous 2 times per day    ipratropium-albuterol  1 ampule Inhalation 4x daily    heparin (porcine)  5,000 Units SubCUTAneous 3 times per day    [Held by provider] guaiFENesin  600 mg Oral BID       Social History:     Social History     Socioeconomic History    Marital status: Single     Spouse name: Not on file    Number of children: Not on file    Years of education: Not on file    Highest education level: Not on file   Occupational History    Not on file   Tobacco Use    Smoking status: Former Smoker     Packs/day: 1.00     Years: 22.00     Pack years: 22.00     Types: Cigarettes     Start date:      Quit date: 1/15/2018     Years since quittin.4    Smokeless tobacco: Never Used   Vaping Use    Vaping Use: Never used   Substance and Sexual Activity    Alcohol use: No    Drug use: No    Sexual activity: Not Currently   Other Topics Concern    Not on file   Social History Narrative    ** Merged History Encounter **          Social Determinants of Health     Financial Resource Strain: Low Risk     Difficulty of Paying Living Expenses: Not hard at all   Food Insecurity: Food Insecurity Present    Worried About [address] in the Last Year: Never true    Obdulio of Food in the Last Year: Sometimes true   Transportation Needs: No Transportation Needs    Lack of Transportation (Medical): No    Lack of Transportation (Non-Medical): No   Physical Activity: Inactive    Days of Exercise per Week: 0 days    Minutes of Exercise per Session: 0 min   Stress: Stress Concern Present    Feeling of Stress : To some extent   Social Connections: Moderately Integrated    Frequency of Communication with Friends and Family: Three times a week    Frequency of Social Gatherings with Friends and Family:  Three times a week    Attends [employer] Services: 1 to 4 times per year    Active Member of [address] [place] [address] or Organizations: No    Attends Club or Organization Meetings: 1 to 4 times per year    Marital Status: Never    Intimate Partner Violence: Not At Risk    Fear of Current or Ex-Partner: No    Emotionally Abused: No    Physically Abused: No    Sexually Abused: No   Housing Stability: Unknown    Unable to Pay for Housing in the Last Year: No    Number of Places Lived in the Last Year: Not on file    Unstable Housing in the Last Year: No       Family History:     Family History   Problem Relation Age of Onset    Emphysema Mother     Alzheimer's Disease Mother     Other Mother         Blood disorder    High Blood Pressure Father     Arthritis Father     Diabetes Sister     Heart Failure Sister     Kidney Disease Sister     High Blood Pressure Brother     Dementia Maternal Grandmother     High Blood Pressure Maternal Grandmother     Dementia Maternal Grandfather     High Blood Pressure Maternal Grandfather     Cancer Paternal Grandmother     Heart Disease Paternal Grandfather     Diabetes Sister     Heart Failure Sister     Other Sister         Intestinal problems    No Known Problems Sister     No Known Problems Son       Medical Decision Making:   I have independently reviewed/ordered the following labs:    CBC with Differential:   Recent Labs     06/15/22  1117 06/16/22  0600   WBC 22.2* 28.1*   HGB 10.7* 11.1*   HCT 35.6* 35.5*    179   LYMPHOPCT 5* 6*   MONOPCT 7 6     BMP:  Recent Labs     06/15/22  1117 06/16/22  0600    138   K 4.3 4.5   CL 92* 96*   CO2 25 24   BUN 88* 91*   CREATININE 2.34* 1.72*     Hepatic Function Panel:   Recent Labs     06/14/22  0739 06/15/22  1117   PROT 7.1 6.5   LABALBU 2.9* 2.9*   BILIDIR 0.54* 0.69*   IBILI 0.35 0.26   BILITOT 0.89 0.95   ALKPHOS 142* 115*   * 473*   * 342*     No results for input(s): RPR in the last 72 hours. No results for input(s): HIV in the last 72 hours. No results for input(s): BC in the last 72 hours. Lab Results   Component Value Date    CREATININE 1.72 06/16/2022    GLUCOSE 283 06/16/2022       Detailed results: Thank you for allowing us to participate in the care of this patient. Please call with questions. This note is created with the assistance of a speech recognition program.  While intending to generate adocument that actually reflects the content of the visit, the document can still have some errors including those of syntax and sound a like substitutions which may escape proof reading. It such instances, actual meaningcan be extrapolated by contextual diversion.     Gogo Tenorio MD  Office: [phone]  Perfect serve / office [phone]

## 2022-06-16 NOTE — PLAN OF CARE
Problem: Respiratory - Adult  Goal: Achieves optimal ventilation and oxygenation  6/16/2022 0115 by Delgado Maier RN  Outcome: Progressing  6/15/2022 1459 by Sean Mann RN  Outcome: Progressing     Problem: Discharge Planning  Goal: Discharge to home or other facility with appropriate resources  6/16/2022 0115 by Delgado Maier RN  Outcome: Progressing  6/15/2022 1459 by Sean Mann RN  Outcome: Progressing     Problem: Pain  Goal: Verbalizes/displays adequate comfort level or baseline comfort level  6/16/2022 0115 by Delgado Maier RN  Outcome: Progressing  6/15/2022 1459 by Sean Mann RN  Outcome: Progressing     Problem: Chronic Conditions and Co-morbidities  Goal: Patient's chronic conditions and co-morbidity symptoms are monitored and maintained or improved  6/16/2022 0115 by Delgado Maier RN  Outcome: Progressing  6/15/2022 1459 by Sean Mann RN  Outcome: Progressing     Problem: Skin/Tissue Integrity  Goal: Absence of new skin breakdown  Description: 1. Monitor for areas of redness and/or skin breakdown  2. Assess vascular access sites hourly  3. Every 4-6 hours minimum:  Change oxygen saturation probe site  4. Every 4-6 hours:  If on nasal continuous positive airway pressure, respiratory therapy assess nares and determine need for appliance change or resting period.   6/16/2022 0115 by Delgado Maier RN  Outcome: Progressing  6/15/2022 1459 by Sean Mann RN  Outcome: Progressing     Problem: Safety - Adult  Goal: Free from fall injury  6/16/2022 0115 by Delgado Maier RN  Outcome: Progressing  6/15/2022 1459 by Sean Mann RN  Outcome: Progressing     Problem: ABCDS Injury Assessment  Goal: Absence of physical injury  6/16/2022 0115 by Delgado Maier RN  Outcome: Progressing  6/15/2022 1459 by Sean Mann RN  Outcome: Progressing  Flowsheets (Taken 6/15/2022 0800)  Absence of Physical Injury: Implement safety measures based on patient assessment     Problem: Confusion  Goal: Confusion, delirium, dementia, or psychosis is improved or at baseline  Description: INTERVENTIONS:  1. Assess for possible contributors to thought disturbance, including medications, impaired vision or hearing, underlying metabolic abnormalities, dehydration, psychiatric diagnoses, and notify attending LIP  2. [place] high risk fall precautions, as indicated  3. Provide frequent short contacts to provide reality reorientation, refocusing and direction  4. Decrease environmental stimuli, including noise as appropriate  5. Monitor and intervene to maintain adequate nutrition, hydration, elimination, sleep and activity  6. If unable to ensure safety without constant attention obtain sitter and review sitter guidelines with assigned personnel  7.  Initiate Psychosocial CNS and Spiritual Care consult, as indicated  6/16/2022 0115 by Beni Rushing RN  Outcome: Progressing  6/15/2022 1459 by Michael Munoz RN  Outcome: Progressing     Problem: Nutrition Deficit:  Goal: Optimize nutritional status  6/16/2022 0115 by Beni Rushing RN  Outcome: Progressing  6/15/2022 1459 by Michael Munoz RN  Outcome: Progressing

## 2022-06-16 NOTE — SIGNIFICANT EVENT
Spoke with Ms. Denia Ignacio, patient's POA over the phone, explained the change in patient's status with patient having decreased oxygen saturation. I explained our plan to give lasix in an attempt to diurese, get an xr in an attempt to check for pneumothorax or structural issue, as well as potentially flip patient back supine. I explained my concerns to the POA that patient might not survive moving from prone to supine, but that she also might not survive if she continues to lay supine. Patient's POA is understanding, states that she is currently on her way over. Patient remains full code at this time.      Elian De La Garza MD  PGY-3 Emergency Medicine  [facility]. [place]

## 2022-06-16 NOTE — PROGRESS NOTES
Results for Dara Parnell (MRN [id number]) as of 6/16/2022      Ref.  Range 6/16/2022 09:20   POC pH Latest Ref Range: 7.350 - 7.450  7.317 (L)   POC pCO2 Latest Ref Range: 35.0 - 48.0 mm Hg 56.2 (H)   POC PO2 Latest Ref Range: 83.0 - 108.0 mm Hg 77.6 (L)   POC HCO3 Latest Ref Range: 21.0 - 28.0 mmol/L 28.8 (H)   POC O2 SAT Latest Ref Range: 94.0 - 98.0 % 94     ABG results reported to RN / Physician

## 2022-06-16 NOTE — PROGRESS NOTES
and only accepted intubation 6/12 -  Intubated and now desaturating despite ARDS protocole and ECMO team consulted.   Sp yellow thick will be sent for cx  Obese and no cellulitis      Interval changes  6/16/2022   Patient Vitals for the past 8 hrs:   BP Temp Temp src Pulse Resp SpO2   06/15/22 2300 119/70   90 27 95 %   06/15/22 2245    89 27 97 %   06/15/22 2230 120/75   90 27 96 %   06/15/22 2215    89 27 95 %   06/15/22 2200 115/77 (!) 100.8 °F (38.2 °C) CORE 90 27 94 %   06/15/22 2145    91 27 93 %   06/15/22 2130 124/72   90 27 95 %   06/15/22 2115    88 27 97 %   06/15/22 2100 123/73   89 27 98 %   06/15/22 2045    88 27 98 %   06/15/22 2038    89 27 98 %   06/15/22 2030 121/75   87 26 98 %   06/15/22 2015    86 27 97 %   06/15/22 2000 119/71 100.2 °F (37.9 °C) CORE 86 27 97 %   06/15/22 1945    85 26 98 %   06/15/22 1930 122/77   85 27 98 %   06/15/22 1900 120/74   85 27 97 %   06/15/22 1845    85 27 98 %   06/15/22 1830 122/73   85 27 98 %   06/15/22 1815      97 %   06/15/22 1804    85 27    06/15/22 1803    85 27    06/15/22 1802    85 27    06/15/22 1800 123/74   85 27 97 %   06/15/22 1745    85 27 98 %   06/15/22 1730 128/71   85 27 97 %   06/15/22 1715    85 27 97 %   06/15/22 1700 126/78   86 27 97 %   06/15/22 1645    85 27 98 %   06/15/22 1630 126/82   86 27 97 %   06/15/22 1615    85 27      Fever since 6/13, improved on 6/15  Refused for ECMO  On ARDS protocole  On FIO2  But saturating better today since proning  Nitrite oxide still on going  Pressors stopped  Sp small due to paralytics  CXR increased effusion but improved infil  procal worse but CRP improved much    Summary of relevant labs:  Labs:  WBC 11-9-26  creat 1.44  Ferritin 1719  Fibrinogen 608  procalc 14 - 15   - 198    Micro:  resp PCR is COVID +  BC neg  Sp cx MRSA    Imaging:  CXR increased R effusion, improved bilat airspace disease    CXR bilat increasing airspace disease 6/12/22  Lungs nearly completely opacified      I have personally reviewed the past medical history, past surgical history, medications, social history, and family history, and I haveupdated the database accordingly. Allergies:   Bee venom, Beta adrenergic blockers, Sulfa antibiotics, Asa [aspirin], Aspirin, Beta adrenergic blockers, and Sulfa antibiotics     Review of Systems:     Review of Systems   Unable to perform ROS: Intubated       Physical Examination :       Physical Exam  Constitutional:       Appearance: She is obese. She is ill-appearing. HENT:      Head: Normocephalic and atraumatic. Nose: Nose normal.      Mouth/Throat:      Mouth: Mucous membranes are moist.   Eyes:      General: No scleral icterus. Cardiovascular:      Rate and Rhythm: Regular rhythm. Tachycardia present. Heart sounds: No murmur heard. No friction rub. Pulmonary:      Breath sounds: No stridor. Abdominal:      Palpations: There is no mass. Hernia: No hernia is present. Genitourinary:     Comments: Urine torie  Musculoskeletal:         General: No swelling or tenderness. Cervical back: Neck supple. No rigidity. Skin:     Coloration: Skin is not jaundiced or pale. Findings: No bruising or erythema.    Neurological:      Comments: intubated   Psychiatric:      Comments: intubated          Past Medical History:     Past Medical History:   Diagnosis Date    Acute on chronic diastolic CHF (congestive heart failure) ([facility] 09/15/2018    HIEN (acute kidney injury) ([facility] 05/06/2019    HIEN (acute kidney injury) ([facility] 11/20/2018    Asthma     CHF     Chronic obstructive lung disease (HCC)     Chronic respiratory failure with hypoxia ([address]     on home O2 therapy    COPD     COVID-19 virus infection 6/8/2022    Diabetes mellitus, new onset ([address] 05/06/2019    Former smoker     quit smoking about 17 months ago/ She has a 22.00 pack-year smoking history    HTN     Hyperglycemia     Hyperlipidemia     Hypertension     Morbid obesity with BMI of 60.0-69.9, adult ([address]     Neuromuscular disorder ([facility]     Neuropathy Right hand    STEPH on CPAP     DME per Dr. Mingo Harris for CPAP of 18 cmh2o    Oxygen dependent     DME 06/2018 for home oxgyen at 2.5-3 lpm ATC    Pedal edema     Pneumonia     Pulmonary hypertension, moderate to severe ([address] 06/09/2018    Torn meniscus     Wears glasses        Past Surgical  History:     Past Surgical History:   Procedure Laterality Date    ABDOMEN SURGERY      Patient had a PEG tube placed 1/2018, removed 4/2018    [address]    CYSTOSCOPY  June 5, 2019    CYSTOSCOPY N/A 6/5/2019    CYSTOSCOPY performed by Deepthi Jimenez MD at [facility]  02/2018    Removed in April 2018    [facility]. CATH POWER PICC TRIPLE  2/2/2018         JOINT REPLACEMENT      MT OFFICE/OUTPT VISIT,PROCEDURE ONLY N/A 2/17/2018    TRACHEOTOMY performed by Allyson Jovel MD at [address]  03/2018    TRACHEOTOMY  02/2018       Medications:      insulin glargine  25 Units SubCUTAneous QAM    budesonide  0.5 mg Nebulization BID    linezolid  600 mg IntraVENous Q12H    meropenem  1,000 mg IntraVENous Q12H    insulin lispro  0-18 Units SubCUTAneous Q4H    midodrine  5 mg Oral TID WC    bumetanide  1 mg IntraVENous BID    famotidine  20 mg Oral Daily    gabapentin  400 mg Oral BID    [Held by provider] docusate sodium  100 mg Oral Daily    cetirizine  10 mg Oral Daily    fluticasone  1 spray Nasal Daily    sodium chloride flush  5-40 mL IntraVENous 2 times per day    ipratropium-albuterol  1 ampule Inhalation 4x daily    heparin (porcine)  5,000 Units SubCUTAneous 3 times per day    [Held by provider] guaiFENesin  600 mg Oral BID       Social History:     Social History     Socioeconomic History    Marital status: Single     Spouse name: Not on file    Number of children: Not on file    Years of education: Not on file    Highest education level: Not on file   Occupational History    Not on file   Tobacco Use    Smoking status: Former Smoker     Packs/day: 1.00     Years: 22.00     Pack years: 22.00     Types: Cigarettes     Start date: 18     Quit date: 1/15/2018     Years since quittin.4    Smokeless tobacco: Never Used   Vaping Use    Vaping Use: Never used   Substance and Sexual Activity    Alcohol use: No    Drug use: No    Sexual activity: Not Currently   Other Topics Concern    Not on file   Social History Narrative    ** Merged History Encounter **          Social Determinants of Health     Financial Resource Strain: Low Risk     Difficulty of Paying Living Expenses: Not hard at all   Food Insecurity: Food Insecurity Present    Worried About [address] in the Last Year: Never true    Obdulio of Food in the Last Year: Sometimes true   Transportation Needs: No Transportation Needs    Lack of Transportation (Medical): No    Lack of Transportation (Non-Medical): No   Physical Activity: Inactive    Days of Exercise per Week: 0 days    Minutes of Exercise per Session: 0 min   Stress: Stress Concern Present    Feeling of Stress : To some extent   Social Connections: Moderately Integrated    Frequency of Communication with Friends and Family: Three times a week    Frequency of Social Gatherings with Friends and Family:  Three times a week    Attends Alevism Services: 1 to 4 times per year    Active Member of Clubs or Organizations: No    Attends Club or Organization Meetings: 1 to 4 times per year    Marital Status: Never    Intimate Partner Violence: Not At Risk    Fear of Current or Ex-Partner: No    Emotionally Abused: No    Physically Abused: No    Sexually Abused: No   Housing Stability: Unknown    Unable to Pay for Housing in the Last Year: No    Number of [place] in the Last Year: Not on file    Unstable Housing in the Last Year: No recognition program.  While intending to generate adocument that actually reflects the content of the visit, the document can still have some errors including those of syntax and sound a like substitutions which may escape proof reading. It such instances, actual meaningcan be extrapolated by contextual diversion.     Juan Rivas MD  Office: [phone]  Perfect serve / office [phone]

## 2022-06-16 NOTE — PLAN OF CARE
Problem: Respiratory - Adult  Goal: Achieves optimal ventilation and oxygenation  6/16/2022 1925 by Ana Luisa Reyna RN  Outcome: Progressing  6/16/2022 1401 by Oleg Shell RN  Outcome: Progressing  6/16/2022 0807 by Aguilar Velazco RCP  Outcome: Progressing     Problem: Discharge Planning  Goal: Discharge to home or other facility with appropriate resources  6/16/2022 1925 by Ana Luisa Reyna RN  Outcome: Progressing  6/16/2022 1401 by Oleg Shell RN  Outcome: Progressing     Problem: Pain  Goal: Verbalizes/displays adequate comfort level or baseline comfort level  6/16/2022 1925 by Ana Luisa Reyna RN  Outcome: Progressing  6/16/2022 1401 by Oleg Shell RN  Outcome: Progressing     Problem: Chronic Conditions and Co-morbidities  Goal: Patient's chronic conditions and co-morbidity symptoms are monitored and maintained or improved  6/16/2022 1925 by Ana Luisa Reyna RN  Outcome: Progressing  6/16/2022 1401 by Oleg Shell RN  Outcome: Progressing     Problem: Skin/Tissue Integrity  Goal: Absence of new skin breakdown  Description: 1. Monitor for areas of redness and/or skin breakdown  2. Assess vascular access sites hourly  3. Every 4-6 hours minimum:  Change oxygen saturation probe site  4. Every 4-6 hours:  If on nasal continuous positive airway pressure, respiratory therapy assess nares and determine need for appliance change or resting period.   6/16/2022 1925 by Ana Luisa Reyna RN  Outcome: Progressing  6/16/2022 1401 by Oleg Shell RN  Outcome: Progressing     Problem: Safety - Adult  Goal: Free from fall injury  6/16/2022 1925 by Ana Luisa Reyna RN  Outcome: Progressing  6/16/2022 1401 by Oleg Shell RN  Outcome: Progressing     Problem: ABCDS Injury Assessment  Goal: Absence of physical injury  6/16/2022 1925 by Ana Luisa Reyna RN  Outcome: Progressing  6/16/2022 1401 by Oleg Shell RN  Outcome: Progressing     Problem: Confusion  Goal: Confusion, delirium, dementia, or psychosis is improved or at baseline  Description: INTERVENTIONS:  1. Assess for possible contributors to thought disturbance, including medications, impaired vision or hearing, underlying metabolic abnormalities, dehydration, psychiatric diagnoses, and notify attending LIP  2. [place] high risk fall precautions, as indicated  3. Provide frequent short contacts to provide reality reorientation, refocusing and direction  4. Decrease environmental stimuli, including noise as appropriate  5. Monitor and intervene to maintain adequate nutrition, hydration, elimination, sleep and activity  6. If unable to ensure safety without constant attention obtain sitter and review sitter guidelines with assigned personnel  7.  Initiate Psychosocial CNS and Spiritual Care consult, as indicated  6/16/2022 1925 by Sherie Goldberg, RN  Outcome: Progressing  6/16/2022 1401 by Pavan Zimmer RN  Outcome: Progressing     Problem: Nutrition Deficit:  Goal: Optimize nutritional status  6/16/2022 1925 by Sherie Goldberg, RN  Outcome: Progressing  6/16/2022 1401 by Pavan Zimmer RN  Outcome: Progressing  Flowsheets (Taken 6/16/2022 1253 by Catheryn Leventhal, MS, RD, LD)  Nutrient intake appropriate for improving, restoring, or maintaining nutritional needs:   Assess nutritional status and recommend course of action   Recommend, monitor, and adjust tube feedings and TPN/PPN based on assessed needs

## 2022-06-16 NOTE — CARE COORDINATION
Transition Planning:  Pt remains intubated/sedated/proning. Fio2 100% PRVC. [facility] following, has accepted. Palliative following as well. Prior LOC - Home with [facility] [address].

## 2022-06-16 NOTE — PLAN OF CARE
Problem: Respiratory - Adult  Goal: Achieves optimal ventilation and oxygenation  6/16/2022 1401 by Av Dominguez RN  Outcome: Progressing  6/16/2022 0807 by Dwight Younger RCP  Outcome: Progressing  6/16/2022 0115 by Niecy Castillo RN  Outcome: Progressing     Problem: Discharge Planning  Goal: Discharge to home or other facility with appropriate resources  6/16/2022 1401 by Av Dominguez RN  Outcome: Progressing  6/16/2022 0115 by Niecy Castillo RN  Outcome: Progressing     Problem: Pain  Goal: Verbalizes/displays adequate comfort level or baseline comfort level  6/16/2022 1401 by Av Dominguez RN  Outcome: Progressing  6/16/2022 0115 by Niecy Castillo RN  Outcome: Progressing     Problem: Chronic Conditions and Co-morbidities  Goal: Patient's chronic conditions and co-morbidity symptoms are monitored and maintained or improved  6/16/2022 1401 by Av Dominguez RN  Outcome: Progressing  6/16/2022 0115 by Niecy Castillo RN  Outcome: Progressing     Problem: Skin/Tissue Integrity  Goal: Absence of new skin breakdown  Description: 1. Monitor for areas of redness and/or skin breakdown  2. Assess vascular access sites hourly  3. Every 4-6 hours minimum:  Change oxygen saturation probe site  4. Every 4-6 hours:  If on nasal continuous positive airway pressure, respiratory therapy assess nares and determine need for appliance change or resting period.   6/16/2022 1401 by Av Dominguez RN  Outcome: Progressing  6/16/2022 0115 by Niecy Castillo RN  Outcome: Progressing     Problem: Safety - Adult  Goal: Free from fall injury  6/16/2022 1401 by Av Dominguez RN  Outcome: Progressing  6/16/2022 0115 by Niecy Castillo RN  Outcome: Progressing     Problem: ABCDS Injury Assessment  Goal: Absence of physical injury  6/16/2022 1401 by Av Dominguez RN  Outcome: Progressing  6/16/2022 0115 by Niecy Castillo RN  Outcome: Progressing     Problem: Confusion  Goal: Confusion, delirium, dementia, or psychosis is improved or at baseline  Description: INTERVENTIONS:  1. Assess for possible contributors to thought disturbance, including medications, impaired vision or hearing, underlying metabolic abnormalities, dehydration, psychiatric diagnoses, and notify attending LIP  2. [place] high risk fall precautions, as indicated  3. Provide frequent short contacts to provide reality reorientation, refocusing and direction  4. Decrease environmental stimuli, including noise as appropriate  5. Monitor and intervene to maintain adequate nutrition, hydration, elimination, sleep and activity  6. If unable to ensure safety without constant attention obtain sitter and review sitter guidelines with assigned personnel  7.  Initiate Psychosocial CNS and Spiritual Care consult, as indicated  6/16/2022 1401 by Jade Rao RN  Outcome: Progressing  6/16/2022 0115 by Vamsi Ray RN  Outcome: Progressing     Problem: Nutrition Deficit:  Goal: Optimize nutritional status  6/16/2022 1401 by Jade Rao RN  Outcome: Progressing  Flowsheets (Taken 6/16/2022 1253 by Keyur Fink, MS, RD, LD)  Nutrient intake appropriate for improving, restoring, or maintaining nutritional needs:   Assess nutritional status and recommend course of action   Recommend, monitor, and adjust tube feedings and TPN/PPN based on assessed needs  6/16/2022 0115 by Vamsi Ray RN  Outcome: Progressing

## 2022-06-16 NOTE — PROGRESS NOTES
Multiple MDs notified and present at bedside. Patient continually desaturating. Family notified as well. After family arrived decision made by MD to emergently supine patient - Family aware of all changes.  Remains full code 

## 2022-06-16 NOTE — PROGRESS NOTES
89 27 94 % (!) 320 lb 3.2 oz (145.2 kg)   22 0545    88 27 94 %    22 0530 101/65   88 27 95 %    22 0515    88 27 94 %    22 0500 101/64   88 27 93 %    22 0445    89 27 90 %    22 0430 100/62   88 27 90 %    22 0415    89 24 90 %    22 0400 99/62 (!) 100.9 °F (38.3 °C) CORE 90 27 90 %    22 0345    90 27 90 %    22 0330 99/64   89 25 91 %    22 0320    90 27 90 %    22 0315    90 27 94 %    22 0300    90 26 93 %    22 0245    90 27 94 %    22 0230 116/75   89 27 98 %    22 0215    90 27 96 %    22 0200 116/68 (!) 101.2 °F (38.4 °C) CORE 89 27 98 %    22 0145    90 27 95 %    22 0130 121/70   90 27 97 %    22 0115    90 27 96 %    22 0100 120/67   91 27 96 %    22 0045    91 27 96 %    22 0030 115/74   93 27 95 %    22 0015    97 27 91 %    22 0006    100 27 97 %    22 0000 124/73 (!) 100.9 °F (38.3 °C) CORE 90 27 95 %    06/15/22 2345    90 27 96 %    06/15/22 2330 123/72   90 27 95 %    06/15/22 2315    89 27 97 %      Last Body weight:   Wt Readings from Last 3 Encounters:   22 (!) 320 lb 3.2 oz (145.2 kg)   22 (!) 334 lb 3.5 oz (151.6 kg)   22 (!) 355 lb (161 kg)     Body Mass Index : Body mass index is 62.53 kg/m². Tmax over 24 hours: Temp (24hrs), Av.1 °F (37.8 °C), Min:98.3 °F (36.8 °C), Max:101.2 °F (38.4 °C)    Ins/Outs:    In: 4143.1 [I.V.:2564.1; NG/GT:544]  Out: 1750 [Urine:1750]    PHYSICAL EXAM:  Constitutional: BMI 65, now laying supine  EENT: ventilator, trach scar present  Neck: short neck  Respiratory: auscultation has rhonchi and scattered wheezes bilaterally  Cardiovascular:  Regular rate   Abdomen: Soft, nontender  Extremities: Bilateral lower and upper extremity edema, minimal pitting    MEDICATIONS:  Scheduled Supine  Humidification Source: Heated wire  Humidification Temp: 37  Circuit Condensation: Drained  Cuff Pressure (cm H2O):  (MOV)  Lab Results   Component Value Date    MODE [facility] 06/16/2022     ABGs:   Arterial Blood Gas result:  pH 7.4; pCO2 45 pO2 61; HCO3 28; %O2 Sat 95%.     DATA:  Complete Blood Count:   Recent Labs     06/14/22  0739 06/15/22  1117 06/16/22  0600   WBC 25.2* 22.2* 28.1*   RBC 4.64 4.05 4.09   HGB 12.4 10.7* 11.1*   HCT 40.7 35.6* 35.5*   MCV 87.7 87.9 86.8   MCH 26.7 26.4 27.1   MCHC 30.5 30.1 31.3   RDW 14.5* 14.3 14.6*    236 179   MPV 12.2 12.1 12.9        Last 3 Blood Glucose:   Recent Labs     06/14/22  0739 06/15/22  1117 06/16/22  0600   GLUCOSE 199* 295* 283*        PT/INR:    Lab Results   Component Value Date    PROTIME 10.6 06/29/2021    INR 1.0 06/29/2021     PTT:    Lab Results   Component Value Date    APTT 20.1 06/29/2021       Basic Metabolic Profile:   Recent Labs     06/14/22  0739 06/15/22  1117 06/16/22  0600    138 138   K 4.8 4.3 4.5   CL 94* 92* 96*   CO2 21 25 24   BUN 63* 88* 91*   CREATININE 2.35* 2.34* 1.72*   GLUCOSE 199* 295* 283*       Liver Function:  Recent Labs     06/15/22  1117   PROT 6.5   LABALBU 2.9*   *   *   ALKPHOS 115*   BILITOT 0.95       Magnesium:   Lab Results   Component Value Date    MG 1.7 06/13/2022    MG 1.8 03/27/2022    MG 1.6 03/25/2022     Phosphorus:   Lab Results   Component Value Date    PHOS 2.4 02/22/2019    PHOS 3.8 02/24/2018    PHOS 2.6 02/22/2018     Ionized Calcium:   Lab Results   Component Value Date    CAION 1.05 02/22/2019    CAION 1.11 02/22/2018    CAION 1.15 02/06/2018        Urinalysis:   Lab Results   Component Value Date    NITRU NEGATIVE 06/12/2022    COLORU Yellow 06/12/2022    PHUR 6.0 06/12/2022    WBCUA None 06/12/2022    RBCUA 2 TO 5 06/12/2022    MUCUS NOT REPORTED 05/08/2019    TRICHOMONAS NOT REPORTED 05/08/2019    YEAST FEW 03/27/2022    BACTERIA FEW 05/08/2019    SPECGRAV 1.018 06/12/2022    LEUKOCYTESUR NEGATIVE 06/12/2022    UROBILINOGEN Normal 06/12/2022    BILIRUBINUR NEGATIVE 06/12/2022    GLUCOSEU NEGATIVE 06/12/2022    KETUA NEGATIVE 06/12/2022    AMORPHOUS NOT REPORTED 05/08/2019       HgBA1c:    Lab Results   Component Value Date    LABA1C 6.8 06/08/2022     TSH:    Lab Results   Component Value Date    TSH 2.36 02/23/2019     Lactic Acid: No results found for: LACTA   Troponin: No results for input(s): TROPONINI in the last 72 hours. Other Labs:  Results for orders placed or performed during the hospital encounter of 06/08/22   Respiratory Panel, Molecular, with COVID-19 (Restricted: peds pts or suitable admitted adults)    Specimen: Nasopharyngeal Swab   Result Value Ref Range    Specimen Description . NASOPHARYNGEAL SWAB     Adenovirus PCR Not Detected Not Detected    Coronavirus 229E PCR Not Detected Not Detected    Coronavirus HKU1 PCR Not Detected Not Detected    Coronavirus NL63 PCR Not Detected Not Detected    Coronavirus OC43 PCR Not Detected Not Detected    SARS-CoV-2, PCR DETECTED (A) Not Detected    Human Metapneumovirus PCR Not Detected Not Detected    Rhino/Enterovirus PCR Not Detected Not Detected    Influenza A by PCR Not Detected Not Detected    Influenza B by PCR Not Detected Not Detected    Parainfluenza 1 PCR Not Detected Not Detected    Parainfluenza 2 PCR Not Detected Not Detected    Parainfluenza 3 PCR Not Detected Not Detected    Parainfluenza 4 PCR Not Detected Not Detected    Resp Syncytial Virus PCR Not Detected Not Detected    Bordetella Parapertussis Not Detected Not Detected    B Pertussis by PCR Not Detected Not Detected    Chlamydia pneumoniae By PCR Not Detected Not Detected    Mycoplasma pneumo by PCR Not Detected Not Detected   Culture, Respiratory    Specimen: Endotracheal   Result Value Ref Range    Specimen Description . ENDOTRACHEAL     Direct Exam < 10 EPITHELIAL CELLS/LPF     Direct Exam >25 NEUTROPHILS/LPF     Direct Exam RARE GRAM POSITIVE COCCI IN CLUSTERS (A)     Culture (A)      METHICILLIN RESISTANT STAPHYLOCOCCUS AUREUS LIGHT GROWTH    Culture NORMAL RESPIRATORY TIFFANY SCANT GROWTH        Susceptibility    Methicillin-Resistant Staphylococcus aureus - BACTERIAL SUSCEPTIBILITY PANEL KEL     penicillin >=0.5 Resistant      clindamycin <=0.25 Sensitive      erythromycin >=8 Resistant      gentamicin* <=0.5 Sensitive       * Gentamicin is used only in combination with other active agents that test susceptible. Induced Clind Resist NEGATIVE Negative      levofloxacin >=8 Resistant      oxacillin >=4 Resistant      tetracycline <=1 Sensitive      trimethoprim-sulfamethoxazole >=320 Resistant      vancomycin 1 Sensitive    MRSA DNA Probe, Nasal    Specimen: Nasal   Result Value Ref Range    Specimen Description . NASAL SWAB     MRSA, DNA, Nasal (A) NEGATIVE     POSITIVE:  MRSA DNA detected by nucleic acid amplification. Culture, Blood 1    Specimen: Blood   Result Value Ref Range    Specimen Description . BLOOD     Special Requests L HAND 5ML     Culture NO GROWTH 1 DAY    Culture, Blood 1    Specimen: Blood   Result Value Ref Range    Specimen Description . BLOOD     Special Requests L HAND 5ML     Culture NO GROWTH 1 DAY    LEGIONELLA ANTIGEN, URINE    Specimen: Urine, straight catheter   Result Value Ref Range    Legionella Pneumophilia Ag, Urine NEGATIVE NEGATIVE   Infectious Disease Intervention   Result Value Ref Range    Intervention Expand Empiric Coverage    CBC with Auto Differential   Result Value Ref Range    WBC 10.1 3.5 - 11.3 k/uL    RBC 3.90 (L) 3.95 - 5.11 m/uL    Hemoglobin 10.5 (L) 11.9 - 15.1 g/dL    Hematocrit 35.7 (L) 36.3 - 47.1 %    MCV 91.5 82.6 - 102.9 fL    MCH 26.9 25.2 - 33.5 pg    MCHC 29.4 28.4 - 34.8 g/dL    RDW 14.7 (H) 11.8 - 14.4 %    Platelets 879 287 - 848 k/uL    MPV 10.8 8.1 - 13.5 fL    NRBC Automated 0.0 0.0 per 100 WBC    Seg Neutrophils 85 (H) 36 - 65 %    Lymphocytes 8 (L) 24 - 43 %    Monocytes 6 3 - 12 %    Eosinophils % 0 (L) 1 - 4 %    Basophils 0 0 - 2 %    Immature Granulocytes 1 (H) 0 %    Segs Absolute 8.56 (H) 1.50 - 8.10 k/uL    Absolute Lymph # 0.79 (L) 1.10 - 3.70 k/uL    Absolute Mono # 0.63 0.10 - 1.20 k/uL    Absolute Eos # <0.03 0.00 - 0.44 k/uL    Basophils Absolute <0.03 0.00 - 0.20 k/uL    Absolute Immature Granulocyte 0.09 0.00 - 0.30 k/uL    RBC Morphology ANISOCYTOSIS PRESENT    Basic Metabolic Panel w/ Reflex to MG   Result Value Ref Range    Glucose 180 (H) 70 - 99 mg/dL    BUN 16 6 - 20 mg/dL    CREATININE 0.87 0.50 - 0.90 mg/dL    Calcium 9.2 8.6 - 10.4 mg/dL    Sodium 139 135 - 144 mmol/L    Potassium 3.8 3.7 - 5.3 mmol/L    Chloride 94 (L) 98 - 107 mmol/L    CO2 30 20 - 31 mmol/L    Anion Gap 15 9 - 17 mmol/L    GFR Non-African American >60 >60 mL/min    GFR African American >60 >60 mL/min    GFR Comment         Troponin   Result Value Ref Range    Troponin, High Sensitivity 12 0 - 14 ng/L   Brain Natriuretic Peptide   Result Value Ref Range    Pro-BNP 4,082 (H) <300 pg/mL   Troponin   Result Value Ref Range    Troponin, High Sensitivity 13 0 - 14 ng/L   ELECTROLYTES PLUS   Result Value Ref Range    POC Sodium 141 138 - 146 mmol/L    POC Potassium 3.6 3.5 - 4.5 mmol/L    POC Chloride 100 98 - 107 mmol/L    POC TCO2 35 (H) 22 - 30 mmol/L    Anion Gap 7 7 - 16 mmol/L   Hemoglobin and hematocrit, blood   Result Value Ref Range    POC Hemoglobin 14.0 12.0 - 16.0 g/dL    POC Hematocrit 41 36 - 46 %   CALCIUM, IONIC (POC)   Result Value Ref Range    POC Ionized Calcium 1.12 (L) 1.15 - 1.33 mmol/L   Hemoglobin A1C   Result Value Ref Range    Hemoglobin A1C 6.8 (H) 4.0 - 6.0 %    Estimated Avg Glucose 148 mg/dL   Procalcitonin   Result Value Ref Range    Procalcitonin 0.19 (H) <0.09 ng/mL   BLOOD GAS, VENOUS   Result Value Ref Range    pH, Zaid 7.316 (L) 7.320 - 7.420    pCO2, Zaid 70.2 (H) 39 - 55    pO2, Zaid 34.8 30 - 50    HCO3, Venous 34.8 (H) 24 - 30 mmol/L    Positive Base Excess, Zaid 1.05 (H) 0.50 - 0.90 mg/dL    Calcium 8.6 8.6 - 10.4 mg/dL    Sodium 138 135 - 144 mmol/L    Potassium 3.9 3.7 - 5.3 mmol/L    Chloride 96 (L) 98 - 107 mmol/L    CO2 30 20 - 31 mmol/L    Anion Gap 12 9 - 17 mmol/L    GFR Non-African American 56 (L) >60 mL/min    GFR African American >60 >60 mL/min    GFR Comment         CBC with Auto Differential   Result Value Ref Range    WBC 10.7 3.5 - 11.3 k/uL    RBC 3.61 (L) 3.95 - 5.11 m/uL    Hemoglobin 9.8 (L) 11.9 - 15.1 g/dL    Hematocrit 32.8 (L) 36.3 - 47.1 %    MCV 90.9 82.6 - 102.9 fL    MCH 27.1 25.2 - 33.5 pg    MCHC 29.9 28.4 - 34.8 g/dL    RDW 14.4 11.8 - 14.4 %    Platelets 908 997 - 812 k/uL    MPV 11.0 8.1 - 13.5 fL    NRBC Automated 0.0 0.0 per 100 WBC    Seg Neutrophils 83 (H) 36 - 65 %    Lymphocytes 9 (L) 24 - 43 %    Monocytes 7 3 - 12 %    Eosinophils % 0 (L) 1 - 4 %    Basophils 0 0 - 2 %    Immature Granulocytes 1 (H) 0 %    Segs Absolute 8.85 (H) 1.50 - 8.10 k/uL    Absolute Lymph # 0.98 (L) 1.10 - 3.70 k/uL    Absolute Mono # 0.74 0.10 - 1.20 k/uL    Absolute Eos # <0.03 0.00 - 0.44 k/uL    Basophils Absolute <0.03 0.00 - 0.20 k/uL    Absolute Immature Granulocyte 0.06 0.00 - 0.30 k/uL   Basic Metabolic Panel w/ Reflex to MG   Result Value Ref Range    Glucose 187 (H) 70 - 99 mg/dL    BUN 39 (H) 6 - 20 mg/dL    CREATININE 0.98 (H) 0.50 - 0.90 mg/dL    Calcium 8.8 8.6 - 10.4 mg/dL    Sodium 139 135 - 144 mmol/L    Potassium 4.0 3.7 - 5.3 mmol/L    Chloride 95 (L) 98 - 107 mmol/L    CO2 32 (H) 20 - 31 mmol/L    Anion Gap 12 9 - 17 mmol/L    GFR Non-African American >60 >60 mL/min    GFR African American >60 >60 mL/min    GFR Comment         CBC with Auto Differential   Result Value Ref Range    WBC 11.6 (H) 3.5 - 11.3 k/uL    RBC 4.05 3.95 - 5.11 m/uL    Hemoglobin 10.7 (L) 11.9 - 15.1 g/dL    Hematocrit 36.7 36.3 - 47.1 %    MCV 90.6 82.6 - 102.9 fL    MCH 26.4 25.2 - 33.5 pg    MCHC 29.2 28.4 - 34.8 g/dL    RDW 14.5 (H) 11.8 - 14.4 %    Platelets 332 138 - 453 k/uL    MPV 11.1 8.1 - 13.5 fL    NRBC Automated 0.0 0.0 per 100 WBC    Seg Neutrophils 82 (H) 36 - 65 %    Lymphocytes 10 (L) 24 - 43 %    Monocytes 7 3 - 12 %    Eosinophils % 0 (L) 1 - 4 %    Basophils 0 0 - 2 %    Immature Granulocytes 1 (H) 0 %    Segs Absolute 9.45 (H) 1.50 - 8.10 k/uL    Absolute Lymph # 1.16 1.10 - 3.70 k/uL    Absolute Mono # 0.83 0.10 - 1.20 k/uL    Absolute Eos # <0.03 0.00 - 0.44 k/uL    Basophils Absolute <0.03 0.00 - 0.20 k/uL    Absolute Immature Granulocyte 0.10 0.00 - 0.30 k/uL    RBC Morphology ANISOCYTOSIS PRESENT    CBC with Auto Differential   Result Value Ref Range    WBC 9.2 3.5 - 11.3 k/uL    RBC 4.45 3.95 - 5.11 m/uL    Hemoglobin 11.8 (L) 11.9 - 15.1 g/dL    Hematocrit 39.1 36.3 - 47.1 %    MCV 87.9 82.6 - 102.9 fL    MCH 26.5 25.2 - 33.5 pg    MCHC 30.2 28.4 - 34.8 g/dL    RDW 14.3 11.8 - 14.4 %    Platelets See Reflexed IPF Result 138 - 453 k/uL    NRBC Automated 0.0 0.0 per 100 WBC    Seg Neutrophils 79 (H) 36 - 65 %    Lymphocytes 15 (L) 24 - 43 %    Monocytes 5 3 - 12 %    Eosinophils % 0 (L) 1 - 4 %    Basophils 0 0 - 2 %    Immature Granulocytes 1 (H) 0 %    Segs Absolute 7.30 1.50 - 8.10 k/uL    Absolute Lymph # 1.33 1.10 - 3.70 k/uL    Absolute Mono # 0.46 0.10 - 1.20 k/uL    Absolute Eos # 0.03 0.00 - 0.44 k/uL    Basophils Absolute <0.03 0.00 - 0.20 k/uL    Absolute Immature Granulocyte 0.06 0.00 - 0.30 k/uL   Immature Platelet Fraction   Result Value Ref Range    Platelet, Fluorescence Platelet clumps present, count appears decreased.  138 - 453 k/uL   Urinalysis with Reflex to Culture    Specimen: Urine   Result Value Ref Range    Color, UA Yellow Yellow    Turbidity UA Clear Clear    Glucose, Ur NEGATIVE NEGATIVE    Bilirubin Urine NEGATIVE NEGATIVE    Ketones, Urine NEGATIVE NEGATIVE    Specific Gravity, UA 1.018 1.005 - 1.030    Urine Hgb NEGATIVE NEGATIVE    pH, UA 6.0 5.0 - 8.0    Protein, UA 2+ (A) NEGATIVE    Urobilinogen, Urine Normal Normal Nitrite, Urine NEGATIVE NEGATIVE    Leukocyte Esterase, Urine NEGATIVE NEGATIVE   Microscopic Urinalysis   Result Value Ref Range    -          WBC, UA None 0 - 5 /HPF    RBC, UA 2 TO 5 0 - 4 /HPF    Casts UA  0 - 8 /LPF     0 TO 2 HYALINE Reference range defined for non-centrifuged specimen.     Epithelial Cells UA 2 TO 5 0 - 5 /HPF   Basic Metabolic Panel w/ Reflex to MG   Result Value Ref Range    Glucose 245 (H) 70 - 99 mg/dL    BUN 40 (H) 6 - 20 mg/dL    CREATININE 1.44 (H) 0.50 - 0.90 mg/dL    Calcium 8.9 8.6 - 10.4 mg/dL    Sodium 137 135 - 144 mmol/L    Potassium 3.5 (L) 3.7 - 5.3 mmol/L    Chloride 91 (L) 98 - 107 mmol/L    CO2 27 20 - 31 mmol/L    Anion Gap 19 (H) 9 - 17 mmol/L    GFR Non-African American 39 (L) >60 mL/min    GFR  47 (L) >60 mL/min    GFR Comment         CBC with Auto Differential   Result Value Ref Range    WBC 26.1 (H) 3.5 - 11.3 k/uL    RBC 4.57 3.95 - 5.11 m/uL    Hemoglobin 12.4 11.9 - 15.1 g/dL    Hematocrit 40.0 36.3 - 47.1 %    MCV 87.5 82.6 - 102.9 fL    MCH 27.1 25.2 - 33.5 pg    MCHC 31.0 28.4 - 34.8 g/dL    RDW 14.5 (H) 11.8 - 14.4 %    Platelets 501 969 - 894 k/uL    MPV 11.3 8.1 - 13.5 fL    NRBC Automated 0.0 0.0 per 100 WBC    Immature Granulocytes 1 (H) 0 %    Seg Neutrophils 87 (H) 36 - 66 %    Lymphocytes 5 (L) 24 - 44 %    Monocytes 7 1 - 7 %    Eosinophils % 0 (L) 1 - 4 %    Basophils 0 0 - 2 %    Absolute Immature Granulocyte 0.26 0.00 - 0.30 k/uL    Segs Absolute 22.70 (H) 1.8 - 7.7 k/uL    Absolute Lymph # 1.31 1.0 - 4.8 k/uL    Absolute Mono # 1.83 (H) 0.1 - 0.8 k/uL    Absolute Eos # 0.00 0.0 - 0.4 k/uL    Basophils Absolute 0.00 0.0 - 0.2 k/uL    Morphology Normal    Methemoglobin   Result Value Ref Range    Methemoglobin 1.1 0.0 - 1.5 %   Methemoglobin   Result Value Ref Range    Methemoglobin 0.5 0.0 - 1.5 %   Magnesium   Result Value Ref Range    Magnesium 1.7 1.6 - 2.6 mg/dL   C-Reactive Protein   Result Value Ref Range    .9 (H) 0.0 - 5.0 mg/L   Procalcitonin   Result Value Ref Range    Procalcitonin 14.67 (H) <0.09 ng/mL   Basic Metabolic Panel w/ Reflex to MG   Result Value Ref Range    Glucose 199 (H) 70 - 99 mg/dL    BUN 63 (H) 6 - 20 mg/dL    CREATININE 2.35 (H) 0.50 - 0.90 mg/dL    Calcium 8.7 8.6 - 10.4 mg/dL    Sodium 140 135 - 144 mmol/L    Potassium 4.8 3.7 - 5.3 mmol/L    Chloride 94 (L) 98 - 107 mmol/L    CO2 21 20 - 31 mmol/L    Anion Gap 25 (H) 9 - 17 mmol/L    GFR Non-African American 22 (L) >60 mL/min    GFR  27 (L) >60 mL/min    GFR Comment         CBC with Auto Differential   Result Value Ref Range    WBC 25.2 (H) 3.5 - 11.3 k/uL    RBC 4.64 3.95 - 5.11 m/uL    Hemoglobin 12.4 11.9 - 15.1 g/dL    Hematocrit 40.7 36.3 - 47.1 %    MCV 87.7 82.6 - 102.9 fL    MCH 26.7 25.2 - 33.5 pg    MCHC 30.5 28.4 - 34.8 g/dL    RDW 14.5 (H) 11.8 - 14.4 %    Platelets 410 762 - 508 k/uL    MPV 12.2 8.1 - 13.5 fL    NRBC Automated 0.4 (H) 0.0 per 100 WBC    Immature Granulocytes 2 (H) 0 %    Seg Neutrophils 86 (H) 36 - 65 %    Lymphocytes 5 (L) 24 - 43 %    Monocytes 7 3 - 12 %    Eosinophils % 0 (L) 1 - 4 %    Basophils 0 0 - 2 %    Absolute Immature Granulocyte 0.50 (H) 0.00 - 0.30 k/uL    Segs Absolute 21.68 (H) 1.50 - 8.10 k/uL    Absolute Lymph # 1.26 1.10 - 3.70 k/uL    Absolute Mono # 1.76 (H) 0.10 - 1.20 k/uL    Absolute Eos # 0.00 0.00 - 0.44 k/uL    Basophils Absolute 0.00 0.00 - 0.20 k/uL    Morphology ANISOCYTOSIS PRESENT    Methemoglobin   Result Value Ref Range    Methemoglobin 0.6 0.0 - 1.5 %   Triglyceride   Result Value Ref Range    Triglycerides 128 <150 mg/dL   Lactic Acid   Result Value Ref Range    Lactic Acid, Whole Blood 4.7 (H) 0.7 - 2.1 mmol/L   MYCOPLASMA PNEUMONIAE ANTIBODY, IGM   Result Value Ref Range    Mycoplasma pneumo IgM 0.37 <0.91   Ferritin   Result Value Ref Range    Ferritin 1,719 (H) 13 - 150 ng/mL   Fibrinogen   Result Value Ref Range    Fibrinogen 608 (H) 140 - 420 mg/dL   Ferritin   Result Value Ref Range    Ferritin 1,689 (H) 13 - 150 ng/mL   Hepatic Function Panel   Result Value Ref Range    Albumin 2.9 (L) 3.5 - 5.2 g/dL    Alkaline Phosphatase 142 (H) 35 - 104 U/L     (H) 5 - 33 U/L     (H) <32 U/L    Total Bilirubin 0.89 0.3 - 1.2 mg/dL    Bilirubin, Direct 0.54 (H) <0.31 mg/dL    Bilirubin, Indirect 0.35 0.00 - 1.00 mg/dL    Total Protein 7.1 6.4 - 8.3 g/dL    Albumin/Globulin Ratio 0.7 (L) 1.0 - 2.5   C-Reactive Protein   Result Value Ref Range    .6 (H) 0.0 - 5.0 mg/L   Procalcitonin   Result Value Ref Range    Procalcitonin 15.45 (H) <0.09 ng/mL   Basic Metabolic Panel w/ Reflex to MG   Result Value Ref Range    Glucose 295 (H) 70 - 99 mg/dL    BUN 88 (H) 6 - 20 mg/dL    CREATININE 2.34 (H) 0.50 - 0.90 mg/dL    Calcium 8.1 (L) 8.6 - 10.4 mg/dL    Sodium 138 135 - 144 mmol/L    Potassium 4.3 3.7 - 5.3 mmol/L    Chloride 92 (L) 98 - 107 mmol/L    CO2 25 20 - 31 mmol/L    Anion Gap 21 (H) 9 - 17 mmol/L    GFR Non-African American 22 (L) >60 mL/min    GFR  27 (L) >60 mL/min    GFR Comment         CBC with Auto Differential   Result Value Ref Range    WBC 22.2 (H) 3.5 - 11.3 k/uL    RBC 4.05 3.95 - 5.11 m/uL    Hemoglobin 10.7 (L) 11.9 - 15.1 g/dL    Hematocrit 35.6 (L) 36.3 - 47.1 %    MCV 87.9 82.6 - 102.9 fL    MCH 26.4 25.2 - 33.5 pg    MCHC 30.1 28.4 - 34.8 g/dL    RDW 14.3 11.8 - 14.4 %    Platelets 681 814 - 906 k/uL    MPV 12.1 8.1 - 13.5 fL    NRBC Automated 0.7 (H) 0.0 per 100 WBC    Seg Neutrophils 86 (H) 36 - 65 %    Lymphocytes 5 (L) 24 - 43 %    Monocytes 7 3 - 12 %    Eosinophils % 0 (L) 1 - 4 %    Basophils 0 0 - 2 %    Immature Granulocytes 2 (H) 0 %    Segs Absolute 19.18 (H) 1.50 - 8.10 k/uL    Absolute Lymph # 1.05 (L) 1.10 - 3.70 k/uL    Absolute Mono # 1.46 (H) 0.10 - 1.20 k/uL    Absolute Eos # <0.03 0.00 - 0.44 k/uL    Basophils Absolute 0.04 0.00 - 0.20 k/uL    Absolute Immature Granulocyte 0.43 (H) 0.00 - 0.30 k/uL   Hepatic Function Panel   Result Value Ref Range    Albumin 2.9 (L) 3.5 - 5.2 g/dL    Alkaline Phosphatase 115 (H) 35 - 104 U/L     (H) 5 - 33 U/L     (H) <32 U/L    Total Bilirubin 0.95 0.3 - 1.2 mg/dL    Bilirubin, Direct 0.69 (H) <0.31 mg/dL    Bilirubin, Indirect 0.26 0.00 - 1.00 mg/dL    Total Protein 6.5 6.4 - 8.3 g/dL    Albumin/Globulin Ratio 0.8 (L) 1.0 - 2.5   Basic Metabolic Panel w/ Reflex to MG   Result Value Ref Range    Glucose 283 (H) 70 - 99 mg/dL    BUN 91 (HH) 6 - 20 mg/dL    CREATININE 1.72 (H) 0.50 - 0.90 mg/dL    Calcium 8.3 (L) 8.6 - 10.4 mg/dL    Sodium 138 135 - 144 mmol/L    Potassium 4.5 3.7 - 5.3 mmol/L    Chloride 96 (L) 98 - 107 mmol/L    CO2 24 20 - 31 mmol/L    Anion Gap 18 (H) 9 - 17 mmol/L    GFR Non-African American 32 (L) >60 mL/min    GFR  39 (L) >60 mL/min    GFR Comment         CBC with Auto Differential   Result Value Ref Range    WBC 28.1 (H) 3.5 - 11.3 k/uL    RBC 4.09 3.95 - 5.11 m/uL    Hemoglobin 11.1 (L) 11.9 - 15.1 g/dL    Hematocrit 35.5 (L) 36.3 - 47.1 %    MCV 86.8 82.6 - 102.9 fL    MCH 27.1 25.2 - 33.5 pg    MCHC 31.3 28.4 - 34.8 g/dL    RDW 14.6 (H) 11.8 - 14.4 %    Platelets 298 388 - 992 k/uL    MPV 12.9 8.1 - 13.5 fL    NRBC Automated 0.5 (H) 0.0 per 100 WBC    Seg Neutrophils PENDING %    Lymphocytes PENDING %    Monocytes PENDING %    Eosinophils % PENDING %    Basophils PENDING %    Immature Granulocytes PENDING 0 %    Segs Absolute PENDING k/uL    Absolute Lymph # PENDING k/uL    Absolute Mono # PENDING k/uL    Absolute Eos # PENDING k/uL    Basophils Absolute PENDING 0.0 - 0.2 k/uL    Absolute Immature Granulocyte PENDING 0.00 - 0.30 k/uL   Methemoglobin   Result Value Ref Range    Methemoglobin 0.4 0.0 - 1.5 %   Venous Blood Gas, POC   Result Value Ref Range    pH, Zaid 7.361 7.320 - 7.430    pCO2, Zaid 60.6 (H) 41.0 - 51.0 mm Hg    pO2, Zaid 34.8 30.0 - 50.0 mm Hg    HCO3, Venous 34.3 (H) 22.0 - 29.0 mmol/L    Positive Base Excess, Zaid 7 (H) 0.0 - 3.0    O2 Sat, Zaid 62 60.0 - 85.0 %   Creatinine W/GFR Point of Care   Result Value Ref Range    POC Creatinine 0.89 0.51 - 1.19 mg/dL    GFR Comment >60 >60 mL/min    GFR Non-African American >60 >60 mL/min    GFR Comment         POCT urea (BUN)   Result Value Ref Range    POC BUN 16 8 - 26 mg/dL   Lactic Acid, POC   Result Value Ref Range    POC Lactic Acid 1.08 0.56 - 1.39 mmol/L   POCT Glucose   Result Value Ref Range    POC Glucose 191 (H) 74 - 100 mg/dL   POC Glucose Fingerstick   Result Value Ref Range    POC Glucose 199 (H) 65 - 105 mg/dL   POC Glucose Fingerstick   Result Value Ref Range    POC Glucose 211 (H) 65 - 105 mg/dL   POC Glucose Fingerstick   Result Value Ref Range    POC Glucose 198 (H) 65 - 105 mg/dL   POC Glucose Fingerstick   Result Value Ref Range    POC Glucose 220 (H) 65 - 105 mg/dL   POC Glucose Fingerstick   Result Value Ref Range    POC Glucose 204 (H) 65 - 105 mg/dL   POC Glucose Fingerstick   Result Value Ref Range    POC Glucose 179 (H) 65 - 105 mg/dL   POC Glucose Fingerstick   Result Value Ref Range    POC Glucose 240 (H) 65 - 105 mg/dL   POC Glucose Fingerstick   Result Value Ref Range    POC Glucose 205 (H) 65 - 105 mg/dL   POC Glucose Fingerstick   Result Value Ref Range    POC Glucose 175 (H) 65 - 105 mg/dL   POC Glucose Fingerstick   Result Value Ref Range    POC Glucose 180 (H) 65 - 105 mg/dL   POC Glucose Fingerstick   Result Value Ref Range    POC Glucose 226 (H) 65 - 105 mg/dL   POC Glucose Fingerstick   Result Value Ref Range    POC Glucose 228 (H) 65 - 105 mg/dL   POC Glucose Fingerstick   Result Value Ref Range    POC Glucose 143 (H) 65 - 105 mg/dL   POC Glucose Fingerstick   Result Value Ref Range    POC Glucose 211 (H) 65 - 105 mg/dL   POC Glucose Fingerstick   Result Value Ref Range    POC Glucose 170 (H) 65 - 105 mg/dL   POC Glucose Fingerstick   Result Value Ref Range    POC Glucose 185 (H) 65 - 105 mg/dL   POC Glucose Fingerstick   Result Value Ref Range    POC Range    POC Glucose 230 (H) 74 - 100 mg/dL   Arterial Blood Gas, POC   Result Value Ref Range    POC pH 7.404 7.350 - 7.450    POC pCO2 45.8 35.0 - 48.0 mm Hg    POC PO2 61.5 (L) 83.0 - 108.0 mm Hg    POC HCO3 28.6 (H) 21.0 - 28.0 mmol/L    Positive Base Excess, Art 3 0.0 - 3.0    POC O2 SAT 91 (L) 94.0 - 98.0 %    O2 Device/Flow/% Adult Ventilator     Sample Site Arterial Line     Mode PRVC     FIO2 100.0    POCT Glucose   Result Value Ref Range    POC Glucose 215 (H) 74 - 100 mg/dL   POC Glucose Fingerstick   Result Value Ref Range    POC Glucose 224 (H) 65 - 105 mg/dL   POC Glucose Fingerstick   Result Value Ref Range    POC Glucose 252 (H) 65 - 105 mg/dL   POC Glucose Fingerstick   Result Value Ref Range    POC Glucose 260 (H) 65 - 105 mg/dL   POC Glucose Fingerstick   Result Value Ref Range    POC Glucose 253 (H) 65 - 105 mg/dL   POC Glucose Fingerstick   Result Value Ref Range    POC Glucose 207 (H) 65 - 105 mg/dL   POC Glucose Fingerstick   Result Value Ref Range    POC Glucose 185 (H) 65 - 105 mg/dL   POC Glucose Fingerstick   Result Value Ref Range    POC Glucose 177 (H) 65 - 105 mg/dL   Arterial Blood Gas, POC   Result Value Ref Range    POC pH 7.394 7.350 - 7.450    POC pCO2 45.6 35.0 - 48.0 mm Hg    POC PO2 79.2 (L) 83.0 - 108.0 mm Hg    POC HCO3 27.8 21.0 - 28.0 mmol/L    Positive Base Excess, Art 2 0.0 - 3.0    POC O2 SAT 95 94.0 - 98.0 %    O2 Device/Flow/% Adult Ventilator     Sample Site Arterial Line     FIO2 100.0    Arterial Blood Gas, POC   Result Value Ref Range    POC pH 7.387 7.350 - 7.450    POC pCO2 49.6 (H) 35.0 - 48.0 mm Hg    POC PO2 69.3 (L) 83.0 - 108.0 mm Hg    POC HCO3 29.8 (H) 21.0 - 28.0 mmol/L    Positive Base Excess, Art 4 (H) 0.0 - 3.0    POC O2 SAT 93 (L) 94.0 - 98.0 %    O2 Device/Flow/% Adult Ventilator     Aubrey Test NOT APPLICABLE     Sample Site Arterial Line     FIO2 100.0     Pt Temp 38.2     POC pH Temp 7.37     POC pCO2 Temp 52 mm Hg    POC pO2 Temp 75 mm Hg   POC Glucose Fingerstick   Result Value Ref Range    POC Glucose 229 (H) 65 - 105 mg/dL   POC Glucose Fingerstick   Result Value Ref Range    POC Glucose 239 (H) 65 - 105 mg/dL   POC Glucose Fingerstick   Result Value Ref Range    POC Glucose 254 (H) 65 - 105 mg/dL   POC Glucose Fingerstick   Result Value Ref Range    POC Glucose 304 (H) 65 - 105 mg/dL   POC Glucose Fingerstick   Result Value Ref Range    POC Glucose 260 (H) 65 - 105 mg/dL   POC Glucose Fingerstick   Result Value Ref Range    POC Glucose 237 (H) 65 - 105 mg/dL   POC Glucose Fingerstick   Result Value Ref Range    POC Glucose 282 (H) 65 - 105 mg/dL   POC Glucose Fingerstick   Result Value Ref Range    POC Glucose 267 (H) 65 - 105 mg/dL   POC Glucose Fingerstick   Result Value Ref Range    POC Glucose 277 (H) 65 - 105 mg/dL   POC Glucose Fingerstick   Result Value Ref Range    POC Glucose 250 (H) 65 - 105 mg/dL   POC Glucose Fingerstick   Result Value Ref Range    POC Glucose 254 (H) 65 - 105 mg/dL   Arterial Blood Gas, POC   Result Value Ref Range    POC pH Can not be calculated 7.350 - 7.450    POC pCO2 Can not be calculated 35.0 - 48.0 mm Hg    POC PO2 Can not be calculated 83.0 - 108.0 mm Hg    POC HCO3 Can not be calculated 21.0 - 28.0 mmol/L    Positive Base Excess, Art Can not be calculated 0.0 - 3.0    POC O2 SAT Can not be calculated 94.0 - 98.0 %    O2 Device/Flow/% Adult Ventilator     Aubrey Test POSITIVE     Sample Site Right Radial Artery     Mode PRVC     FIO2 100.0    POCT Glucose   Result Value Ref Range    POC Glucose Can not be calculated 74 - 100 mg/dL   EKG 12 Lead   Result Value Ref Range    Ventricular Rate 99 BPM    Atrial Rate 99 BPM    P-R Interval 148 ms    QRS Duration 84 ms    Q-T Interval 360 ms    QTc Calculation (Bazett) 462 ms    P Axis 46 degrees    R Axis 53 degrees    T Axis -17 degrees     Radiology/Imaging:  XR CHEST PORTABLE   Final Result   Stable diffuse bilateral airspace disease.       2.  Stable small right pleural effusion. XR CHEST (SINGLE VIEW FRONTAL)   Final Result   Support lines and tubes appear satisfactory in positioning. Improved diffuse bilateral airspace disease. Increased right pleural effusion. Unchanged small left effusion. XR CHEST PORTABLE   Final Result   1. No pneumothorax      2. Diffuse bilateral airspace opacities      3. Small left pleural effusion         XR CHEST PORTABLE   Final Result   There is increasing bilateral airspace disease. Posteriorly layering pleural   fluid may contribute to the increasing opacity. No pneumothorax. Tubes and lines remain in good position. XR ABDOMEN FOR NG/OG/NE TUBE PLACEMENT   Final Result   1. Satisfactory position of support devices. Stable multifocal   consolidation could represent edema or pneumonia. 2. Enteric tube terminates in the gastric body. No bowel obstruction. XR CHEST PORTABLE   Final Result   1. Satisfactory position of support devices. Stable multifocal   consolidation could represent edema or pneumonia. 2. Enteric tube terminates in the gastric body. No bowel obstruction. XR CHEST PORTABLE   Final Result   Successful intubation with the endotracheal tube 3.7 cm above the lore. Perihilar edema/infiltrates suggestive of ARDS. VL DUP LOWER EXTREMITY VENOUS BILATERAL   Final Result      XR CHEST PORTABLE   Final Result   Cardiomegaly mild vascular congestion similar to prior.            ASSESSMENT:     Patient Active Problem List    Diagnosis Date Noted    COVID-19 virus infection 06/08/2022    Acute on chronic diastolic congestive heart failure ([address] 06/08/2022    Respiratory distress     Diarrhea 04/19/2022    Cor pulmonale, chronic ([address] 04/17/2022    CHF (congestive heart failure), NYHA class I, acute on chronic, combined ([address] 04/08/2022    Prediabetes 03/19/2022    Hyperlipidemia 03/19/2022    Hypokalemia 03/19/2022    Hypomagnesemia 03/19/2022    Dyspnea 03/18/2022    Type 2 diabetes mellitus ([facility] 05/06/2019    Dizziness 05/06/2019    Normocytic anemia 05/06/2019    Acute on chronic congestive heart failure (HCC)     Right heart failure, unspecified ([address] 02/22/2019    Acute on chronic diastolic heart failure (HCC) 02/22/2019    Muscle spasm 11/19/2018    Chronic narcotic dependence ([address] 11/16/2018    Chronically on benzodiazepine therapy 11/16/2018    Neuropathy 11/16/2018    Epigastric pain 11/16/2018    Chronic respiratory failure ([address] 09/16/2018    Pulmonary hypertension, moderate to severe ([address] 06/09/2018    Morbid obesity with BMI of 50.0-59.9, adult ([address]     Obesity hypoventilation syndrome ([address]     H/O tracheostomy     STEPH (obstructive sleep apnea)     Chest pain 06/08/2018    Acute non-recurrent pansinusitis     Status post tracheostomy ([address] 02/17/2018    Status post insertion of percutaneous endoscopic gastrostomy (PEG) tube ([address] 02/17/2018    Rhinovirus infection     Goals of care, counseling/discussion     Fever     Disease due to rhinovirus     ARDS (adult respiratory distress syndrome) ([address] 02/02/2018    COPD exacerbation ([address]     NSTEMI (non-ST elevated myocardial infarction) ([address] 01/27/2018    Acute pulmonary edema ([address] 01/27/2018    Hypertensive emergency 01/27/2018    Acute respiratory failure with hypoxia and hypercapnia ([address] 01/27/2018    Smoker 01/27/2018    Morbid obesity ([address] 01/27/2018    SOB (shortness of breath) 01/27/2018    Pneumonia of both lower lobes due to infectious organism 01/26/2018    Asthma 09/21/2014    Pneumonia 09/21/2014    HTN (hypertension) 09/21/2014    Torn meniscus 09/21/2014      PLAN:     75-year-old female with acute on chronic hypercapnic hypoxic respiratory failure requiring max BiPAP with superimposed COVID, CHF and COPD, severe pulmonary hypertension. Intubated on 6/12 for decreasing oxygen saturation. ARDS protocol, nitric oxide started.  ECMO team is consulted but declined patient. Concern for superimposed PNA with elevated CRP and procal. Started meropenem, linezolid, remdesivir. Resp cultures grew MRSA. Blood cultures drawn. PLAN/MEDICAL DECISION MAKING:  Neurologic:   Patient is intubated   Sedation: versed, fentanyl  Paralytic   Nitric oxide    Cardiovascular:  · Echo ordered: EF 65% with RVSP 97mmHg: not ECMO candidate  · COVID-induced respiratory failure with superimposed bacterial PNA causing sepsis and mild shock  · CHF  · Norepinephrine is off   · Nitroglycerin as needed for chest pain  · Bumetanide 1mg bid > increased to home dose bumex 2mg bid > decreased  to 1mg bid  Pulmonary:  · Patient on ventilator  · Patient's baseline is \"60-80\" at home. Vent Information  Ventilator ID: [id number]  Vent Mode: PRVC  · Ventilator Initiate:  Yes   · Patient high 90s on 100% FiO2 Severe pulmonary hypertension  · Comorbid COPD: duoneb, tessalon, mucinex ER held  · completed 5 days Rocephin  · Started zosyn   · switched to linezolid, meropenem on  with blood cx drawn x2, sputum cx growing MRSA  ·  CXR: right effusion    GI/Nutrition  · Pepcid for GI prophylaxis  · Diabetic feeds  · On high protein diet  · BUN 91    Renal/Fluid/Electrolyte  · Patient on bumetanide  · 500ml bolus given for low UOP,  LR maintenance fluids started 100ml/h  · Continue to monitor urine output  · Replete electrolytes as needed    ID  WBC:   Lab Results   Component Value Date    WBC 28.1 (H) 2022     Tmax: Temp (24hrs), Av.1 °F (37.8 °C), Min:98.3 °F (36.8 °C), Max:101.2 °F (38.4 °C)  ·   · Patient has been febrile, tachycardic, WBC 25.   · ID consulted for COVID worsening and superimposed infection, starting meropenem, linezolid ; discontinued remdesivir    Hematology:  Recent Labs     22  0739 06/15/22  1117 22  0600   HGB 12.4 10.7* 11.1*   ·  stable  Endocrine:   glucose controlled - most recent BGL is   Recent Labs     22  0739 06/15/22  1117 06/16/22  0600   GLUCOSE 199* 295* 283*   Medium ISS changed to high ISS, increased lantus to 35 daily    DVT Prophylaxis  · Patient is on heparin for DVT prophylaxis.   Discharge Needs:  PT, OT, ST, SW and Case Management      CODE STATUS: Full Code    DISPOSITION:  [x] To remain ICU  [] OK for out of ICU from [address], [place]  TY Resident  06/16/22 7:12 AM

## 2022-06-16 NOTE — PLAN OF CARE
Problem: Respiratory - Adult  Goal: Achieves optimal ventilation and oxygenation  6/16/2022 0807 by Adelina Joe RCP  Outcome: Progressing  6/16/2022 0115 by Yaakov Palomares RN  Outcome: Progressing

## 2022-06-16 NOTE — PROGRESS NOTES
[facility]  Occupational Therapy Not Seen Note    DATE: 2022    NAME: Lori Barrow  MRN: [id number]   : 1975      Patient not seen this date for Occupational Therapy due to:    Patient is not appropriate for active participation in OT evaluation/treatment at this time d/t intubated/sedated/proning    Next Scheduled Treatment: 2022    Electronically signed by ADDISON Crow on 2022 at 5:04 PM

## 2022-06-16 NOTE — PROGRESS NOTES
Comprehensive Nutrition Assessment    Type and Reason for Visit:  Reassess    Nutrition Recommendations/Plan:   1. Modify TF to Renal formula at goal rate 30 mL/hr continuous, plus bolus 1 bottle protein modular daily. Provides 1378 kcal, 84 g PRO. Monitor for tolerance. Malnutrition Assessment:  Malnutrition Status: At risk for malnutrition (Comment) (06/13/22 1325)    Context:  Acute Illness     Findings of the 6 clinical characteristics of malnutrition:  Energy Intake:  Mild decrease in energy intake (Comment)  Weight Loss:  No significant weight loss     Body Fat Loss:  No significant body fat loss     Muscle Mass Loss:  No significant muscle mass loss    Fluid Accumulation:  Moderate to Severe Extremities,Generalized   Strength:  Not Performed    Nutrition Assessment:    Pt remains intubated and proning. Per RN, concern for high-protein TF, BUN increasing. Labs reviewed: 24-hr glucose 250-322 mg/dL, BUN 90 mg/dL. Meds reviewed. +NGT. Wt fluctuation noted, will monitor. Nutrition Related Findings:    Labs/meds reviewed. LBM 6/15. +2 generalized/extremity edema. Wound Type: Pressure Injury (coccyx)       Current Nutrition Intake & Therapies:    Average Meal Intake: NPO  Average Supplements Intake: NPO  ADULT TUBE FEEDING; Nasogastric; Renal Formula; Continuous; 30; No; 30; Q 4 hours; Protein; bolus 1 protein modular daily  Current Tube Feeding (TF) Orders:  · Feeding Route: Nasogastric  · Formula: Renal Formula  · Schedule: Continuous  · Feeding Regimen: 30 mL/hr  · Additives/Modulars: Protein (bolus 1 bottle protein modular daily)  · Water Flushes: 30 mL Q4H  · Current TF & Flush Orders Provides: Peptide-based HP @ 55 mL/hr = 1424 kcal, 141 g PRO. · Goal TF & Flush Orders Provides: Renal @ 30 mL/hr, plus 1 bottle protein modular = 1378 kcal, 84 g PRO.       Anthropometric Measures:  Height: 5' (152.4 cm)  Ideal Body Weight (IBW): 100 lbs (45 kg)    Admission Body Weight: 334 lb 14.1 oz (151.9 kg)  Current Body Weight: 320 lb 3.2 oz (145.2 kg) (6/16, bedscale), 320.2 % IBW. Current BMI (kg/m2): 62.5                          BMI Categories: Obese Class 3 (BMI 40.0 or greater)    Estimated Daily Nutrient Needs:  Energy Requirements Based On: Kcal/kg  Weight Used for Energy Requirements: Ideal  Energy (kcal/day): 8496-8582 kcal/day  Weight Used for Protein Requirements: Ideal  Protein (g/day):  g pro/day  Method Used for Fluid Requirements: Other (Comment)  Fluid (ml/day): per MD    Nutrition Diagnosis:   · Inadequate oral intake related to impaired respiratory function as evidenced by NPO or clear liquid status due to medical condition (need for enteral nutrition support)      Nutrition Interventions:   Food and/or Nutrient Delivery: Modify Tube Feeding  Nutrition Education/Counseling: No recommendation at this time  Coordination of Nutrition Care: Continue to monitor while inpatient       Goals:  Previous Goal Met: Progressing toward Goal(s)  Goals: Meet at least 75% of estimated needs,within 7 days       Nutrition Monitoring and Evaluation:   Behavioral-Environmental Outcomes: None Identified  Food/Nutrient Intake Outcomes: Enteral Nutrition Intake/Tolerance  Physical Signs/Symptoms Outcomes: Biochemical Data,Nutrition Focused Physical Findings,Skin,Weight,Fluid Status or Edema    Discharge Planning:     Too soon to determine     Sandy Valle, MS, RD, LD  Contact: [phone]

## 2022-06-16 NOTE — PROGRESS NOTES
Physician Progress Note      PATIENTMaribsleina Samples  [facility] #:                  [id number]  :                       1975  ADMIT DATE:       2022 6:12 AM  DISCH DATE:  RESPONDING  PROVIDER #:        Joel Garcia          QUERY TEXT:    Pt admitted with COVID-19 infection/CHF exacerbation and noted to have SIRS. If possible, please clarify if you are evaluating and/or treating: The medical record reflects the following:  Risk Factors: Covid ,CHF,COPD  Clinical Indicators: To ED with SOB-Covid positive with CHF   Exacerbation. Acute on chronic Resp failure worsening-required intubation . WBC 11.6-26.1, CXR- worsening multifocalconsolidation could represent edema   or pneumonia. .Diuresed initially for concern of CHF exacerbation. ID consulted   for worsening wbc. Producing thick yellow aputum. Tachy 100s with TMax 102.5  Treatment: ID consult, IV Decadron/Ceftriaxone/Remdesivir,Intubation on Vent   with Ecmo consult,ICU Monitoring  Options provided:  -- Sepsis present on admission due to COVID-19 infection  -- Sepsis not present on admission due to COVID-19 infection  -- Sepsis present on admission due to COVID-19 pneumonia  -- Sepsis not present on admission due to COVID-19 pneumonia  -- Covid-19 infection without sepsis  -- Covid-19 pneumonia without sepsis  -- Other - I will add my own diagnosis  -- Disagree - Not applicable / Not valid  -- Disagree - Clinically unable to determine / Unknown  -- Refer to Clinical Documentation Reviewer    PROVIDER RESPONSE TEXT:    Provider is clinically unable to determine a response to this query. Query created by: Kayode Gonzalez on 2022 12:49 PM      Electronically signed by:   QUEENIE SHANKS 2022 12:45 PM

## 2022-06-16 NOTE — PROGRESS NOTES
Pt currently proned, head turned to the right, no skin breakdown noticed. ETT secured by stretch gauze per hospital protocol. Pt tolerated poorly, SpO2 decreased to 80% Physician / RN beside and aware. RN x 4 assisted. EtCO2 and Vte present at this time. ETT in correct position, suction catheter easy to pass.

## 2022-06-17 NOTE — PROGRESS NOTES
0220 - Pt began decompensating, HR and BP started to decrease. Dr. Prakash Jung, critical care resident, notified and bedside to evaluate. Multiple RNs and 2 RTs bedside at this time. Pt became pulseless and CPR started. See code documentation for further details.      Electronically signed by Enoc Farley RN on 6/17/2022 at 3:24 AM

## 2022-06-17 NOTE — PLAN OF CARE
I had  discussion with the patient's family in [address] [place] [address],First Floor sister Anthony Pollock in presence of the RN taking care of the patient. Her sister understood the patient is an grim situation, patient's current clinical condition, laboratory and radiographic findings as well as recommendations of physicians consulted on the case were discussed with the patient's family in detail in simple [place]. All questions and concerns of the family were addressed, and appropriate emotional support was provided. After understanding patient's current medical condition, family decided that they wanted to continue the ongoing treatment but in case patient's heart stops (cardiac arrest) or the patient stops breathing (respiratory arrest), they do not want any chest compressions, insertion of a breathing tube down patient's throat for respiratory support or other similar  resuscitative measures to be performed on the patient. They requested that if such a condition arises, the patient should be made comfortable and nature should be allowed to take its course. They asked that patient's code status should be changed to [facility] (no intubation), and signed the [facility] order form in my presence, which was witnessed by RNSridevi. Will honor family's wishes, and will change patient's code status to [facility] (no intubation). Viki Almazan MD, MYANNI.   Department of Internal Medicine,  [facility])             6/17/2022, 2:49 AM

## 2022-06-17 NOTE — FLOWSHEET NOTE
[facility] [address]   Patient Death Note  DEATH   Shift date: 2022    Shift day: Thursday  Shift #: 3                 Room # 3001/3001-01   Name: Dina Peterson            Age: 55 y.o. Gender: female          Mandaeism: [address] [place] [address] Road of Orthodoxy: Unknown  Admit Date & Time: 2022  6:12 AM     Referral: Code Blue  Actual date of death: 2022   TOD: 0251       SITUATION AT DEATH:  Patient \"coded\" and received \"one round of chest compressions. \" Patient's sister changed patient's code status to [facility]. \" Patient's time of death was declared at [id number] by Dr. Lele Washington? No    SPIRITUAL/EMOTIONAL INTERVENTION:   responded to [employer], indicating that patient is \"not doing well. \" Carmen Lyons arrived to unit and charge nurse was on call with patient's decision maker, Denia.  was present on unit as patient was \"coded. \" Carmen Lyons greeted patient's son, sister and brother-in-law on unit, upon their arrival. Carmen Lyons was present as resident doctor spoke to family about patient's condition.  assisted them in donning and doffing personal protective equipment.  provided comfort, support, hospitality and grief care. Patient's family members were tearful and upset over patient's death. Patient's family members thanked  for care and support. Family Received Grief Packet?  will prepare sympathy card and grief packet to be sent to patient's sonSukh. NAME AND PHONE NUMBER OF DOCTOR SIGNING DEATH CERTIFICATE: Dr. Odilia Bernard ([phone]).  are now contacting the  home after a patient death.  spoke to/left message for [facility] indicating family's choice for their services.  called  home on 2022 at [phone]. Copy of COMPLETED Release of Body Form Received?   Yes    Patient's belongings: Collie Starcher of patient's belongings was given to family     HOME:  Name: [facility] [address] [place]: [facility]  Phone Number: [phone]    NEXT OF KIN:  Name: Jesus Chapa  Relationship: Sister  Phone Number: [phone]    Name: Claudetta Nay  Relationship: Son  Street Address: [facility] [address]: [place]  Zip code: [address]? No    IF SO, WHAT? N/A     06/17/22 0210   Encounter Summary   Service Provided For: Family   Referral/Consult From: Nursing Supervisor/Manager   Support System Children;Family members   Last Encounter  06/16/22   Complexity of Encounter High   Begin Time 0210   End Time  0420   Total Time Calculated 130 min   Crisis   Type   (Code Blue)   Spiritual/Emotional needs   Type Spiritual Support   Grief, Loss, and Adjustments   Type End of Life;Death   Assessment/Intervention/Outcome   Assessment Tearful  (Grieving)   Intervention Active listening;Discussed death, afterlife; Discussed illness injury and its impact; Explored/Affirmed feelings, thoughts, concerns;Grief Care;Sustaining Presence/Ministry of presence   Outcome Comfort;Grieving;Receptive   Plan and Referrals   Plan/Referrals No future visits requested     Electronically signed by Henry Michael on 6/17/2022 at 5:57 AM.  [place]  [phone]

## 2022-06-17 NOTE — FLOWSHEET NOTE
SPIRITUAL CARE DEPARTMENT - [address]  PROGRESS NOTE    Shift date: 6.16.2022  Shift day: Thursday   Shift # 2    Room # 5792/7343-08   Name: Bolivar Joel                Druze: unknown   Place of Baptism: unknown    Referral: Routine Visit    Admit Date & Time: 6/8/2022  6:12 AM    Assessment:  Bolivar Joel is a 55 y.o. female in the hospital. Upon entering the room writer observes family in the hallway in what appears to be in a state of anxiety and anticipatory grief. Family was contacted to come to the hospital due to patient's critical condition. Nurse updated family regarding health situation. Intervention:  Writer introduced self and title as  Writer offered space for family  to express feelings, needs, and concerns and provided a ministry presence. Determined family support available. Outcome:  Family appreciative of the support. Plan:  Chaplains will remain available to offer spiritual and emotional support as needed.       Electronically signed by Cindy Resendez on 6/16/2022 at 8:36 PM.  [address]  [phone]         06/16/22 1830   Encounter Summary   Service Provided For: Family   Referral/Consult From: [address] Family members   Last Encounter  06/16/22   Complexity of Encounter Moderate   Begin Time 1830   End Time  1840   Total Time Calculated 10 min   Encounter    Type Family Care   Assessment/Intervention/Outcome   Assessment Coping;Complicated grieving   Intervention Active listening     Electronically signed by Anurag Pham on 6/16/2022 at 8:36 PM

## 2022-06-17 NOTE — DEATH NOTES
Death Pronouncement Note  Patient's Name: Lori Barrow   Patient's YOB: 1975  MRN Number: [id number]    Admitting Provider: Pushpa Collier MD  Attending Provider: Pushpa Collier MD    Patient was examined and the following were absent: Pulses, Blood Pressure and Respiratory effort    I declared the patient dead on 6/17/2022 at 2:51 AM    Preliminary Cause of Death: Cardiac arrest     Electronically signed by Jag Tijerina MD on 6/17/22 at 3:03 AM EDT

## 2022-06-17 NOTE — PROGRESS NOTES
DEATH NOTE    PATIENT NAME: Ama Byers  YOB: 1975  MEDICAL RECORD NO. [id number]  DATE: 6/17/2022  PRIMARY CARE PHYSICIAN: Partha Vallejo DO    DIAGNOSIS OF DEATH     I have confirmed the death of this patient in accordance with accepted medical standards.   The patient is dead as evidenced by cardiac death or cessation of brain function:    Cardiac Death (check all that apply):     [x]  Absence of respiratory effort by observation     [x]  Absence of pulse by palpation     [x]  Absence of blood pressure by sphygmomanometry     [x]  Absence of sustainable cardiac rhythm by monitor    Death by Cessation of Brain Function (check all that apply):     []  Absence of Cerebral Function with no motor response     []  Absence of brain stem function by systemic physical exam     []  Failure to respond with respiratory drive by apnea test     []  Cerebral Electrical silence as interpreted by qualified reader        OR     []  Absence of brain blood flow by radiologic technique      CERTIFICATION OF DEATH     I have pronounced the patient dead on:     Date: 6/17/2022 at 2:51 am    NOTIFICATIONS     Attending physician that will sign Death Certificate: Dr. Aguilar Lackey notified: Name and/or Relationship:                                                           [x]  Per Nursing     notified (Name):                                                         [x]  Per Nursing      Jethro Washburn MD  6/17/2022, 3:04 AM

## 2022-06-17 NOTE — PLAN OF CARE
Problem: Respiratory - Adult  Goal: Achieves optimal ventilation and oxygenation  6/16/2022 2110 by Sussy Chin RCP  Outcome: Progressing     BRONCHOSPASM/BRONCHOCONSTRICTION     [x]         IMPROVE AERATION/BREATH SOUNDS  [x]   ADMINISTER BRONCHODILATOR THERAPY AS APPROPRIATE  [x]   ASSESS BREATH SOUNDS  [x]   IMPLEMENT AEROSOL/MDI PROTOCOL  [x]   PATIENT EDUCATION AS NEEDED

## 2022-06-17 NOTE — SIGNIFICANT EVENT
Patient was maxed out on all pressors, went into PEA arrest at 2:22 CPR started according to ACLS protocol, patient received bicarb x 1, calcium x 1, epi x2, ROSC achieved 2:26

## 2022-06-19 LAB
CULTURE: NORMAL
CULTURE: NORMAL
Lab: NORMAL
Lab: NORMAL
SPECIMEN DESCRIPTION: NORMAL
SPECIMEN DESCRIPTION: NORMAL

## 2022-06-22 ENCOUNTER — TELEPHONE (OUTPATIENT)
Dept: PULMONOLOGY | Age: 47
End: 2022-06-22

## 2023-02-22 NOTE — FLOWSHEET NOTE
SPIRITUAL CARE DEPARTMENT - [facility] [address]  PROGRESS NOTE    Shift date: 3/19/22  Shift day: Friday   Shift # 2    Room # 2008/2008-01   Name: Christina Barkley            Age: 55 y.o. Gender: female          Gnosticist:    Place of Yazdanism:     Referral: Routine Visit    Admit Date & Time: 3/18/2022 10:51 AM    PATIENT/EVENT DESCRIPTION:  Christina Barkley is a 55 y.o. female         SPIRITUAL ASSESSMENT/INTERVENTION:   appeared welcome in patients room.  was a ministry of presence to patient. Patient asked for copy of daily bread which  provided.  offered prayer for spiritual support/comfort which was accepted. Patient expressed gratitude for visit. SPIRITUAL CARE FOLLOW-UP PLAN:  Chaplains will remain available to offer spiritual and emotional support as needed. Electronically signed by Stacie Ma, on 3/19/2022 at 2:55 PM.  [address]  [phone]       03/19/22 4509   Encounter Summary   Services provided to: Patient   Referral/Consult From: Rounding   Continue Visiting   (3/19/22)   Complexity of Encounter Low   Length of Encounter 30 minutes   Spiritual Assessment Completed Yes   Spiritual/Yazidism   Type Spiritual support   Assessment Approachable; Hopeful;Coping   Intervention Active listening;Prayer;Provided reading materials/devotional materials;Sustaining presence/ Ministry of presence   Outcome Expressed gratitude Erythromycin Counseling:  I discussed with the patient the risks of erythromycin including but not limited to GI upset, allergic reaction, drug rash, diarrhea, increase in liver enzymes, and yeast infections.

## 2023-11-08 NOTE — TELEPHONE ENCOUNTER
Patient had phoned here, left a message on the 31st stating she has more shortness of breath and swelling. CHF Clinic was closed due to the holiday. Left message for patient yesterday with no return call. Phoned and spoke with patient today. She states she is slightly improved and the Dr. Leonardo Stafford added back her lasix 20 mg 1x day, on top of her demadex 20 mg 1x day. She also takes zaroxolyn 2.5 mg daily. The list she states, her cardiology med list, and EPIC med list do not coincide. Spoke with Dax Thao CNP who knows this patient well. She states she also is unsure what patient takes and that she should be following with renal. Phoned patient and gave phone number to her for Dr. Marina Bagley. Patient agrees to call. Next visit here 1/16/19.

## 2024-08-15 NOTE — PROGRESS NOTES
Palliative Care Progress Note    NAME:  Bolivar Joel  MEDICAL RECORD NUMBER:  [id number]  AGE: 55 y.o. GENDER: female  : 1975  TODAY'S DATE:  6/15/2022    Reason for Consult:  goals of care  History of Present Illness     The patient is a 55 y.o. Non- / non  female who presents with Respiratory Distress      Referred to Palliative Care by  [x] Physician   [] Nursing  [] Family Request   [] Other:       She was admitted to the ICU service for Respiratory distress [R06.03]  Acute on chronic diastolic congestive heart failure ([facility] [I50.33]. Her hospital course has been associated with Covid, respiratory distress, ventilator. The patient has a complicated medical history and has been hospitalized since 2022  6:12 AM.    Patient is a full code status. She remains on ventilator with sedation. Her FIO2 100% and Peep 20. Patient is going to be proned today. Patient febrile this am 102.4 and BC ordered. Patient is not a candidate for ECMO due to pulmonary hypertension. Patient labs today WBC 22.2, RBC 4.05, hemoglobin 10.7, hematocrit 35.6, platelets 561. Patient had BMP, procalcitonin, CRP drawn and await results. Palliative care will continue to follow patient and reach out to patient's family to provide emotional support and updates as needed.      OVERNIGHT EVENTS:  Patient is a full code status   Patient on ventilator with sedation and paralyzation and proned  Emotional support     VITALS   BP 96/60   Pulse 88   Temp 100 °F (37.8 °C) (Core)   Resp 27   Ht 5' (1.524 m)   Wt (!) 332 lb 1.6 oz (150.6 kg)   SpO2 95%   BMI 64.86 kg/m²     PAST MEDICAL HISTORY  Past Medical History:   Diagnosis Date    Acute on chronic diastolic CHF (congestive heart failure) ([facility] 09/15/2018    HIEN (acute kidney injury) ([facility] 2019    [facility] 2018    Asthma     CHF     Chronic obstructive lung disease ([facility]     Chronic respiratory failure with hypoxia ([facility] Pt informed    on home O2 therapy    COPD     COVID-19 virus infection 6/8/2022    Diabetes mellitus, new onset ([facility] 05/06/2019    Former smoker     quit smoking about 17 months ago/ She has a 22.00 pack-year smoking history    HTN     Hyperglycemia     Hyperlipidemia     Hypertension     Morbid obesity with BMI of 60.0-69.9, adult ([address]     Neuromuscular disorder ([facility]     Neuropathy Right hand    STEPH on CPAP     DME per Dr. Malinda Mckenzie for CPAP of 18 cmh2o    Oxygen dependent     DME 06/2018 for home oxgyen at 2.5-3 lpm ATC    Pedal edema     Pneumonia     Pulmonary hypertension, moderate to severe ([facility] 06/09/2018    Torn meniscus     Wears glasses         SURGICAL HISTORY  Past Surgical History:   Procedure Laterality Date    ABDOMEN SURGERY      Patient had a PEG tube placed 1/2018, removed 4/2018    [address]    CYSTOSCOPY  June 5, 2019    CYSTOSCOPY N/A 6/5/2019    CY[facility] at [facility]  02/2018    Removed in April 2018    [facility] CATH POWER PICC TRIPLE  2/2/2018         JOINT REPLACEMENT      [place] OFFICE/OUTPT VISIT,PROCEDURE ONLY N/A 2/17/2018    TRACHEOTOMY performed by Yashira Keys MD at [address]  03/2018    TRACHEOTOMY  02/2018        FAMILY HISTORY  Family History   Problem Relation Age of Onset    Emphysema Mother     Alzheimer's Disease Mother     Other Mother         Blood disorder    High Blood Pressure Father     Arthritis Father     Diabetes Sister     Heart Failure Sister     Kidney Disease Sister     High Blood Pressure Brother     Dementia Maternal Grandmother     High Blood Pressure Maternal Grandmother     Dementia Maternal Grandfather     High Blood Pressure Maternal Grandfather     Cancer Paternal Grandmother     Heart Disease Paternal Grandfather     Diabetes Sister     Heart Failure Sister     Other Sister         Intestinal problems    No Known Problems [address] No Known Problems Son         SOCIAL HISTORY  Social History     Tobacco History     Smoking Status  Former Smoker Smoking Start Date  1/1/1995 Quit date  1/15/2018 Smoking Frequency  1 pack/day for 22 years (22 pk yrs)    Smoking Tobacco Type  Cigarettes    Smokeless Tobacco Use  Never Used          Alcohol History     Alcohol Use Status  No          Drug Use     Drug Use Status  No          Sexual Activity     Sexually Active  Not Currently                 Assessment        REVIEW OF SYSTEMS    [x]   UNABLE TO OBTAIN:   Patient on ventilator with sedation unable to assess ROS     PHYSICAL ASSESSMENT:     General: []  Oriented x3      [] well appearing      [x] Intubated      [x] ill appearing      [] Other:    Mental Status: [] normal mental status exam      [] drowsy      [] Confused      [x] Other: sedated   Cardiovascular: [x]  Regular rate/rhythm      [] Arrhythmia      [] Other:     Chest: [] Effort normal      [] lungs clear      [] respiratory distress      [] Tachypnea      [x]  Other:patient on ventilator with sedation   Abdomen: [x] Soft/non-tender      []  Normal appearance      [] Distended      [] Ascites      [] Other:    Neurological: [] Normal Speech      [] Normal Sensation      []  Deficits present:  Patient on ventilator with sedation   Extremity:  [x] normal skin color/temp      [] clubbing/cyanosis      []  No edema      [] Other:     Palliative Performance Scale:  ___60%  Ambulation reduced; Significant disease; Can't do hobbies/housework; intake normal or reduced; occasional assist; LOC full/confusion  ___50%  Mainly sit/lie; Extensive disease; Can't do any work; Considerable assist; intake normal or reduced; LOC full/confusion  ___40%  Mainly in bed; Extensive disease; Mainly assist; intake normal or reduced; LOC full/confusion   _x__30%  Bed Bound; Extensive disease; Total care; intake reduced; LOCfull/confusion  ___20%  Bed Bound; Extensive disease;  Total care; intake minimal; Drowsy/coma  ___10%  Bed Bound; Extensive disease; Total care; Mouth care only; Drowsy/coma  ___0       Death      Plan      Palliative Interaction:  Called patient sister Denia and provided her with updates on patient. I explained with her medical history of pulmonary hypertension and now with Covid and being on ventilator that her condition was guarded. I also informed her of her having a fever and BC were drawn. I also informed her that patient was going to be proned today. Denia thanked me for update and stated she was thankful for all the support that caregivers have provided her and her family. Palliative care will continue to follow. Education/support to family  Discharge planning/helping to coordinate care  Communications with primary service  Pharmacologic pain management  Providing support for coping/adaptation/distress of family  Discussing meaning/purpose   Caregiver support/education  Continue with current plan of care  Code status clarified: Full Code  Principle Problem/Diagnosis:  Acute respiratory failure with hypoxia and hypercapnia (HCC)    Additional Assessments:  Principal Problem:    Acute respiratory failure with hypoxia and hypercapnia (HCC)  Active Problems:    COVID-19 virus infection    Acute on chronic diastolic congestive heart failure (HCC)    Respiratory distress    Morbid obesity ([address]    COPD exacerbation ([address]    Goals of care, counseling/discussion    Pulmonary hypertension, moderate to severe ([address]    Morbid obesity with BMI of 50.0-59.9, adult ([address]    Obesity hypoventilation syndrome (HCC)    STEPH (obstructive sleep apnea)    Type 2 diabetes mellitus ([address]    Prediabetes  Resolved Problems:    * No resolved hospital problems.  *  1- Symptom management/ pain control     Pain Assessment:  Patent on ventilator with sedation                Anxiety:  patient on ventilator with sedation                           Dyspnea:  patient on ventilator with sedation

## (undated) DEVICE — INTENDED FOR TISSUE SEPARATION, AND OTHER PROCEDURES THAT REQUIRE A SHARP SURGICAL BLADE TO PUNCTURE OR CUT.: Brand: BARD-PARKER ® CARBON RIB-BACK BLADES

## (undated) DEVICE — GLOVE ORANGE PI 7 1/2   MSG9075

## (undated) DEVICE — TUBE TRACH AD L95MM OD11MM ID6MM CUF PROX EXT LEN HI VOL LO

## (undated) DEVICE — GLOVE SURG SZ 65 THK91MIL LTX FREE SYN POLYISOPRENE

## (undated) DEVICE — GLOVE ORANGE PI 8 1/2   MSG9085

## (undated) DEVICE — DISSECTOR ULTRASONIC L13CM CRDLSS W/ TORQ WRNCH SONICISION

## (undated) DEVICE — BLADE ES ELASTOMERIC COAT INSUL DURABLE BEND UPTO 90DEG

## (undated) DEVICE — POSITIONER HD W8XH4XL8.5IN RASPBERRY FOAM SLT

## (undated) DEVICE — TOWEL,OR,DSP,ST,NATURAL,DLX,4/PK,20PK/CS: Brand: MEDLINE

## (undated) DEVICE — PROTECTOR ULN NRV PUR FOAM HK LOOP STRP ANATOMICALLY

## (undated) DEVICE — BINDER ABD MULT PANEL 72-84 IN 2XL 9 IN ELASTIC PROCARE

## (undated) DEVICE — Z INACTIVE USE 2527070 DRAPE SURG W40XL44IN UNDERBUTTOCK SMS POLYPR W/ PCH BK DISP

## (undated) DEVICE — CONTROL SYRINGE LUER-LOCK TIP: Brand: MONOJECT

## (undated) DEVICE — CONTAINER,SPECIMEN,4OZ,OR STRL: Brand: MEDLINE

## (undated) DEVICE — SPONGE,PEANUT,XRAY,ST,SM,3/8",5/CARD: Brand: MEDLINE INDUSTRIES, INC.

## (undated) DEVICE — SVMMC HD AND NK PK

## (undated) DEVICE — PAD,ABDOMINAL,5"X9",ST,LF,25/BX: Brand: MEDLINE INDUSTRIES, INC.

## (undated) DEVICE — CORD,CAUTERY,BIPOLAR,STERILE: Brand: MEDLINE

## (undated) DEVICE — PACK PROCEDURE SURG CYSTO SVMMC LF

## (undated) DEVICE — PEN: MARKING STD 100/CS: Brand: MEDICAL ACTION INDUSTRIES

## (undated) DEVICE — SPONGE DRN W4XL4IN RAYON/POLYESTER 6 PLY NONWOVEN PRECUT

## (undated) DEVICE — STANDARD HYPODERMIC NEEDLE,POLYPROPYLENE HUB: Brand: MONOJECT

## (undated) DEVICE — GLOVE ORANGE PI 7   MSG9070

## (undated) DEVICE — DRAPE,REIN 53X77,STERILE: Brand: MEDLINE

## (undated) DEVICE — Z DISCONTINUED APPLICATOR SURG PREP 0.35OZ 2% CHG 70% ISO ALC W/ HI LT